# Patient Record
Sex: FEMALE | Race: WHITE | NOT HISPANIC OR LATINO | Employment: OTHER | URBAN - METROPOLITAN AREA
[De-identification: names, ages, dates, MRNs, and addresses within clinical notes are randomized per-mention and may not be internally consistent; named-entity substitution may affect disease eponyms.]

---

## 2017-03-03 DIAGNOSIS — R11.0 NAUSEA: ICD-10-CM

## 2017-03-03 RX ORDER — ONDANSETRON 4 MG/1
TABLET, ORALLY DISINTEGRATING ORAL
Qty: 30 TABLET | Refills: 1 | Status: SHIPPED
Start: 2017-03-03 | End: 2017-03-20

## 2017-03-03 NOTE — TELEPHONE ENCOUNTER
ondansetron (ZOFRAN-ODT) 4 MG disintegrating tablet    Last Written Prescription Date:  9/27/16  Last Fill Quantity: 30,   # refills: 1  Last Office Visit with [facility], [facility] or [facility] prescribing provider: 10/27/16  Future Office visit:  none

## 2017-03-15 DIAGNOSIS — I10 ESSENTIAL HYPERTENSION, BENIGN: ICD-10-CM

## 2017-03-15 RX ORDER — AMLODIPINE BESYLATE 10 MG/1
10 TABLET ORAL DAILY
Qty: 10 TABLET | Refills: 0 | Status: SHIPPED | OUTPATIENT
Start: 2017-03-15 | End: 2017-03-24

## 2017-03-20 DIAGNOSIS — R11.0 NAUSEA: ICD-10-CM

## 2017-03-20 RX ORDER — ONDANSETRON 4 MG/1
TABLET, ORALLY DISINTEGRATING ORAL
Qty: 90 TABLET | Refills: 2 | Status: SHIPPED
Start: 2017-03-20 | End: 2019-06-24

## 2017-03-24 ENCOUNTER — OFFICE VISIT (OUTPATIENT)
Dept: PHARMACY | Facility: CLINIC | Age: 67
End: 2017-03-24
Payer: COMMERCIAL

## 2017-03-24 ENCOUNTER — OFFICE VISIT (OUTPATIENT)
Dept: DERMATOLOGY | Facility: CLINIC | Age: 67
End: 2017-03-24

## 2017-03-24 VITALS — DIASTOLIC BLOOD PRESSURE: 82 MMHG | HEART RATE: 90 BPM | SYSTOLIC BLOOD PRESSURE: 136 MMHG

## 2017-03-24 VITALS — BODY MASS INDEX: 29.85 KG/M2 | WEIGHT: 196.3 LBS

## 2017-03-24 DIAGNOSIS — B35.3 TINEA PEDIS OF BOTH FEET: ICD-10-CM

## 2017-03-24 DIAGNOSIS — F41.9 ANXIETY: ICD-10-CM

## 2017-03-24 DIAGNOSIS — L82.0 INFLAMED SEBORRHEIC KERATOSIS: ICD-10-CM

## 2017-03-24 DIAGNOSIS — M25.50 MULTIPLE JOINT PAIN: ICD-10-CM

## 2017-03-24 DIAGNOSIS — E63.9 NUTRITIONAL DEFICIENCY: ICD-10-CM

## 2017-03-24 DIAGNOSIS — L72.0 EIC (EPIDERMAL INCLUSION CYST): ICD-10-CM

## 2017-03-24 DIAGNOSIS — Z85.3 HISTORY OF BREAST CANCER IN FEMALE: ICD-10-CM

## 2017-03-24 DIAGNOSIS — L21.9 DERMATITIS, SEBORRHEIC: Primary | ICD-10-CM

## 2017-03-24 DIAGNOSIS — Z85.828 HISTORY OF NONMELANOMA SKIN CANCER: ICD-10-CM

## 2017-03-24 DIAGNOSIS — E78.00 PURE HYPERCHOLESTEROLEMIA: ICD-10-CM

## 2017-03-24 DIAGNOSIS — L21.9 SEBORRHEIC DERMATITIS: ICD-10-CM

## 2017-03-24 DIAGNOSIS — D18.01 CHERRY ANGIOMA: ICD-10-CM

## 2017-03-24 DIAGNOSIS — K29.50 CHRONIC GASTRITIS WITHOUT BLEEDING, UNSPECIFIED GASTRITIS TYPE: ICD-10-CM

## 2017-03-24 DIAGNOSIS — I10 ESSENTIAL HYPERTENSION, BENIGN: Primary | ICD-10-CM

## 2017-03-24 PROCEDURE — 99607 MTMS BY PHARM ADDL 15 MIN: CPT | Performed by: PHARMACIST

## 2017-03-24 PROCEDURE — 99605 MTMS BY PHARM NP 15 MIN: CPT | Performed by: PHARMACIST

## 2017-03-24 RX ORDER — KETOCONAZOLE 20 MG/ML
SHAMPOO TOPICAL DAILY PRN
Qty: 120 ML | Refills: 11 | Status: SHIPPED | OUTPATIENT
Start: 2017-03-24 | End: 2018-10-16

## 2017-03-24 RX ORDER — PRENATAL VIT 91/IRON/FOLIC/DHA 28-975-200
COMBINATION PACKAGE (EA) ORAL 2 TIMES DAILY
Qty: 12 G | Refills: 1 | Status: SHIPPED | OUTPATIENT
Start: 2017-03-24 | End: 2018-10-16

## 2017-03-24 ASSESSMENT — PAIN SCALES - GENERAL: PAINLEVEL: NO PAIN (0)

## 2017-03-24 NOTE — NURSING NOTE
Dermatology Rooming Note    Nadira Perez's goals for this visit include:   Chief Complaint   Patient presents with     Derm Problem     Nadira is here today for a skin check.           Ruma Stevens LPN

## 2017-03-24 NOTE — PATIENT INSTRUCTIONS
Recommendations from today's MTM visit:                                                        1. I will double check and get back to you, but it is likely appropriate for you to go back on a daily 81mg tablet of aspirin.    2. Have your vitamin D checked yearly if you are going to take more than 2000 IU of vitamin D chronically (daily).    Next MTM visit: June 16th at 10am    To schedule another MTM appointment, please call the clinic directly or you may call the MTM scheduling line at [phone] or toll-free at [phone].     My Clinical Pharmacist's contact information:                                                      It was a pleasure seeing you today!  Please feel free to contact me with any questions or concerns you have.      Bee Hudson PharmD  Medication Therapy Management Provider  Phone:[phone]  Pager: [phone]    You may receive a survey about the MTM services you received.  I would appreciate your feedback to help me serve you better in the future. Please fill it out and return it when you can. Your comments will b1. Pt e anonymous.

## 2017-03-24 NOTE — MR AVS SNAPSHOT
After Visit Summary   3/24/2017    Nadira Perez    MRN: [id number]           Patient Information     Date Of Birth          1950        Visit Information        Provider Department      3/24/2017 10:00 AM Bee Hudson [facility] Medication Therapy Management        Today's Diagnoses     Essential hypertension, benign    -  1    Chronic gastritis without bleeding, unspecified gastritis type        Multiple joint pain        Anxiety        Pure hypercholesterolemia        History of breast cancer in female        Nutritional deficiency          Care Instructions    Recommendations from today's MTM visit:                                                        1. I will double check and get back to you, but it is likely appropriate for you to go back on a daily 81mg tablet of aspirin.    2. Have your vitamin D checked yearly if you are going to take more than 2000 IU of vitamin D chronically (daily).    Next MTM visit: June 16th at 10am    To schedule another MTM appointment, please call the clinic directly or you may call the MTM scheduling line at [phone] or toll-free at [phone].     My Clinical Pharmacist's contact information:                                                      It was a pleasure seeing you today!  Please feel free to contact me with any questions or concerns you have.      Bee Hudson, PharmD  Medication Therapy Management Provider  Phone:[phone]  Pager: [phone]    You may receive a survey about the MTM services you received.  I would appreciate your feedback to help me serve you better in the future. Please fill it out and return it when you can. Your comments will b1. Pt e anonymous.                  Follow-ups after your visit        Your next 10 appointments already scheduled     Apr 17, 2017  3:00 PM CDT   (Arrive by 2:45 PM)   Return Visit with Khushbu Louise MD   [facility] ([facility])    [address]  2nd Floor  [facility] [id number] [phone]            Jun 16, 2017 10:00 AM CDT   (Arrive by 9:45 AM)   SHORT with Bee Hudson RPH   [facility] Medication Therapy Management ([facility])    [address]  [address]th [facility] [id number] [phone]              Who to contact     If you have questions or need follow up information about today's clinic visit or your schedule please contact [facility] MEDICATION THERAPY MANAGEMENT directly at [phone].  Normal or non-critical lab and imaging results will be communicated to you by ProspectStreamhart, letter or phone within 4 business days after the clinic has received the results. If you do not hear from us within 7 days, please contact the clinic through IMTt or phone. If you have a critical or abnormal lab result, we will notify you by phone as soon as possible.  Submit refill requests through CopyRightNow or call your pharmacy and they will forward the refill request to us. Please allow 3 business days for your refill to be completed.          Additional Information About Your Visit        ProspectStreamharPriori Data Information     [employer]Now gives you secure access to your electronic health record. If you see a primary care provider, you can also send messages to your care team and make appointments. If you have questions, please call your primary care clinic.  If you do not have a primary care provider, please call [phone] and they will assist you.        Care EveryWhere ID     This is your Care EveryWhere ID. This could be used by other organizations to access your [place] medical records  [id number]        Your Vitals Were     BMI (Body Mass Index)                   29.85 kg/m2            Blood Pressure from Last 3 Encounters:   03/24/17 136/82   12/22/16 124/80   11/23/16 120/72    Weight from Last 3 Encounters:   03/24/17 196 lb 4.8 oz (89 kg)   12/22/16 202 lb (91.6 kg)   10/27/16 198 lb (89.8  kg)              Today, you had the following     No orders found for display         Today's Medication Changes          These changes are accurate as of: 3/24/17 11:59 PM.  If you have any questions, ask your nurse or doctor.               Start taking these medicines.        Dose/Directions    ketoconazole 2 % shampoo   Commonly known as:  NIZORAL   Used for:  Dermatitis, seborrheic   Started by:  Abe Wilkerson MD        Apply topically daily as needed for itching or irritation   Quantity:  120 mL   Refills:  11       terbinafine 1 % cream   Commonly known as:  lamISIL AT   Used for:  Tinea pedis of both feet   Started by:  Abe Wilkerson MD        Apply topically 2 times daily For fungal infection not resolved with other antifungals (e.g. Clotrimazole)   Quantity:  12 g   Refills:  1            Where to get your medicines      Some of these will need a paper prescription and others can be bought over the counter.  Ask your nurse if you have questions.     Bring a paper prescription for each of these medications     ketoconazole 2 % shampoo    terbinafine 1 % cream                Primary Care Provider Office Phone # Fax #    MERCEDES Rivera [phone]       [facility] [address] [place] [address] 7[address]  [facility] [address]        Thank you!     Thank you for choosing [facility] MEDICATION THERAPY MANAGEMENT  for your care. Our goal is always to provide you with excellent care. Hearing back from our patients is one way we can continue to improve our services. Please take a few minutes to complete the written survey that you may receive in the mail after your visit with us. Thank you!             Your Updated Medication List - Protect others around you: Learn how to safely use, store and throw away your medicines at www.disposemymeds.org.          This list is accurate as of: 3/24/17 11:59 PM.  Always use your most recent med list.                   Brand Name Dispense  Instructions for use    amLODIPine 10 MG tablet    NORVASC    90 tablet    Take 1 tablet (10 mg) by mouth daily AS DIRECTED       aspirin 81 MG tablet      Take 1 tablet by mouth daily Reported on 3/24/2017       atorvastatin 40 MG tablet    LIPITOR    90 tablet    Take 1 tablet (40 mg) by mouth daily Please put on profile- pt just received 90-day supply of cholesterol meds       * cholecalciferol 1000 UNIT tablet    vitamin D    100 tablet    Take 1 tablet (1,000 Units) by mouth daily       * cholecalciferol 5000 UNITS Caps capsule    vitamin D3    120 capsule    Take 1 capsule (5,000 Units) by mouth daily       CoQ10 100 MG Caps     100 capsule    Take 2 capsules (200 mg) by mouth daily       diclofenac 1 % Gel topical gel    VOLTAREN    100 g    Apply 4 grams to knees or 2 grams to hands four times daily using enclosed dosing card.       gabapentin 300 MG capsule    NEURONTIN    400 capsule    1-2 tablets EVERY 8 HOURS       HYDROcodone-acetaminophen 5-325 MG per tablet    NORCO    20 tablet    Take 1 tablet by mouth every 6 hours as needed       ketoconazole 2 % shampoo    NIZORAL    120 mL    Apply topically daily as needed for itching or irritation       LORazepam 2 MG tablet    ATIVAN    30 tablet    Take 1 tablet at night for sleep       metoprolol 25 MG 24 hr tablet    TOPROL-XL    90 tablet    Take 1 tablet (25 mg) by mouth daily       Multi-vitamin Tabs tablet   Generic drug:  multivitamin, therapeutic with minerals      Take 1 tablet by mouth daily.       OMEGA-3 FISH OIL PO      Take 1 capsule by mouth daily       ondansetron 4 MG ODT tab    ZOFRAN-ODT    90 tablet    1-2 tabs po prn every 6 hours       REPHRESH Gel     14 Box    Place 2 g vaginally every 3 days       SYNTHROID 112 MCG tablet   Generic drug:  levothyroxine     90 tablet    Take 1/2 tablet daily       tamoxifen 20 MG tablet    NOLVADEX    90 tablet    Take 1 tablet (20 mg) by mouth daily       terbinafine 1 % cream    lamISIL AT    12 g     Apply topically 2 times daily For fungal infection not resolved with other antifungals (e.g. Clotrimazole)       triamcinolone 0.1 % ointment    KENALOG    30 g    Apply topically 2 times daily       triamterene-hydrochlorothiazide 37.5-25 MG per tablet    MAXZIDE-25    90 tablet    Take 1 tablet by mouth daily       * Notice:  This list has 2 medication(s) that are the same as other medications prescribed for you. Read the directions carefully, and ask your doctor or other care provider to review them with you.

## 2017-03-24 NOTE — PROGRESS NOTES
SUBJECTIVE/OBJECTIVE:                                                    Nadira Perez is a 66 year old female coming in for a follow-up visit for Medication Therapy Management.      Chief Complaint: Follow up from our visit on Dec 22, 2016.  Pt has no major concerns today but is here for annual check.    Tobacco: No tobacco use   Alcohol: not currently using    Medication Adherence: no issues reported    Hypertension: Pt is currently taking amlodipine 10mg, triamterene/HCTZ 37.5/25mg, and metoprolol 25mg daily. Pt is not having as much of the urinary urgency as she had in the past and occasional ankle swelling when she has more sodium.  GERD/Nausea: Pt had gastritis likely from chronic ibuprofen use. She has had improvement in heartburn sypmtoms since stopping NSAIDs but still has occasional nausea, which she treats when she needs to with Zofran. She is no longer taking ranitidine.  Low Back/Left Wrist Pain: Pt has not tried Votaren yet because she has been wrapping her wrists for lymphedema. She has been using APAP 1000mg TID with some improvement in pain. Pt rarely uses Vicodin (fills maybe twice per year and has not had one in 6 months). Pt tried to come down on gabapentin and had worsening pain, so she remains on 600mg TID.  Anxiety: Pt rarely uses lorazepam, maybe once every 3-4 months. She is trying to avoid using it altogether as she knows it has been associated with increased risk for dementia.  Lipids/Stroke Prevention: Pt is currently taking atorvastatin 40mg, fish oil and CoQ10 (unsure of dose) without complaint of side effects. Pt historically has taken a daily aspirin but stopped when diagnosed with gastritis and has not restarted.   The 10-year ASCVD risk score (Rani THOM Jr, et al., 2013) is: 7.7%    Values used to calculate the score:      Age: 66 years      Sex: Female      Is Non- : No      Diabetic: No      Tobacco smoker: No      Systolic Blood Pressure: 136 mmHg       Is BP treated: Yes      HDL Cholesterol: 56 mg/dL      Total Cholesterol: 208 mg/dL  Hx of Ductal Carcinoma: Pt continues to see oncology yearly. Pt continues on tamoxifen and has been on this therapy for 5 years, following 5 years of Arimidex therapy. She is wondering if I think she should continue tamoxifen, switch back to Arimidex, or discontinue secondary prophylaxis altogether.  Vitamin D Deficiency: Pt is currently taking vitamin D 6000 IU daily and has been on this dose chronically due to history of deficiency.  25 OH Vit D2   Date Value Ref Range Status   11/21/2011 <5 ug/L Final   11/17/2010 <5 ug/L Final   05/27/2010 <5 ug/L Final     25 OH Vit D3   Date Value Ref Range Status   11/21/2011 34 ug/L Final   11/17/2010 34 ug/L Final   05/27/2010 32 ug/L Final     Current labs include:  BP Readings from Last 3 Encounters:   12/22/16 124/80   11/23/16 120/72   10/27/16 130/81     Today's Vitals: Wt 196 lb 4.8 oz (89 kg)  BMI 29.85 kg/m2 From other visit today:  Vital Signs 3/24/2017   Systolic 136   Diastolic 82   Pulse 90     Lab Results   Component Value Date    A1C 6.0 10/27/2016   .  Lab Results   Component Value Date    CHOL 208 10/27/2016     Lab Results   Component Value Date    TRIG 182 10/27/2016     Lab Results   Component Value Date    HDL 56 10/27/2016     Lab Results   Component Value Date     10/27/2016       Liver Function Studies -   Recent Labs   Lab Test  09/15/16   1411   PROTTOTAL  7.5   ALBUMIN  4.3   BILITOTAL  0.4   ALKPHOS  70   AST  30   ALT  39       Lab Results   Component Value Date    UCRR 49 12/22/2008       Last Basic Metabolic Panel:  Lab Results   Component Value Date     09/15/2016      Lab Results   Component Value Date    POTASSIUM 3.0 09/15/2016     Lab Results   Component Value Date    CHLORIDE 102 09/15/2016     Lab Results   Component Value Date    BUN 13 09/15/2016     Lab Results   Component Value Date    CR 0.55 09/15/2016     GFR Estimate   Date Value  Ref Range Status   09/15/2016 >90  Non  GFR Calc   >60 mL/min/1.7m2 Final   10/30/2015 >90  Non  GFR Calc   >60 mL/min/1.7m2 Final   11/06/2014 >90  Non  GFR Calc   >60 mL/min/1.7m2 Final     GFR Estimate If Black   Date Value Ref Range Status   09/15/2016 >90   GFR Calc   >60 mL/min/1.7m2 Final   10/30/2015 >90   GFR Calc   >60 mL/min/1.7m2 Final   11/06/2014 >90   GFR Calc   >60 mL/min/1.7m2 Final     TSH   Date Value Ref Range Status   10/27/2016 3.09 0.40 - 4.00 mU/L Final   ]    Most Recent Immunizations   Administered Date(s) Administered     Hepatitis A Vac Ped/Adol-2 Dose 10/26/2005     Hepatitis B 10/26/2005     Influenza (IIV3) 10/01/2014     Influenza Intranasal Vaccine 4 valent 10/02/2015     Influenza Vaccine IM 3yrs+ 4 Valent IIV4 10/13/2016     Pneumococcal 23 valent 10/01/2008     TD (ADULT, 7+) 04/26/2005     TDAP Vaccine (Boostrix) 11/06/2012     Typhoid IM 04/26/2005     Zoster vaccine, live 11/14/2012     ASSESSMENT:                                                    Current medications were reviewed today as discussed above.      Medication Adherence: no issues identified    Hypertension: Stable. BP at goal <140/90.  GERD/Nausea: Stable. Pt encouraged to remain off oral NSAIDs.  Low Back/Left Wrist Pain: Stable. Current therapies appropriate and likely safe.  Anxiety: Stable. Pt encouraged to avoid use as much as possible but use 3 times per year is likely safe.  Lipids/Stroke Prevention: Stable. Pt to remain off ASA per current USPSTF guidelines for primary prevention.  Hx of Ductal Carcinoma: Unimproved. Pt encouraged to follow guidance from oncology, as they are able to provide decision making guidance beyond my scope of knowledge.  Vitamin D Deficiency: Needs improvement. Pt would benefit from yearly Vitamin D level if taking doses greater than 2000 IU chronically.     PLAN:                                                       1. Pt to remain off aspirin.    2. Pt to have vitamin D level drawn at next annual exam.    I spent 40 minutes with this patient today.  All changes were made via collaborative practice agreement with Matilde Quiñonez. A copy of the visit note was provided to the patient's primary care provider.     Will follow up in 3 months, sooner if needed.    The patient was given a summary of these recommendations as an after visit summary.    Bee Hudson PharmD  Medication Therapy Management Provider  Phone:[phone]  Pager: [phone]

## 2017-03-24 NOTE — MR AVS SNAPSHOT
After Visit Summary   3/24/2017    Nadira Perez    MRN: [id number]           Patient Information     Date Of Birth          1950        Visit Information        Provider Department      3/24/2017 10:30 AM Abe Wilkerson MD [facility] Dermatology        Today's Diagnoses     Dermatitis, seborrheic    -  1    Tinea pedis of both feet           Follow-ups after your visit        Follow-up notes from your care team     Return in about 1 year (around 3/24/2018).      Your next 10 appointments already scheduled     Apr 17, 2017  3:00 PM CDT   (Arrive by 2:45 PM)   Return Visit with Khushbu Louise MD   [facility] ([facility])    [address]  2nd Floor  [facility] [address] [phone]            Jun 16, 2017 10:00 AM CDT   (Arrive by 9:45 AM)   [facility] ([facility]    [address]  4th Floor  [facility] [address] [phone]              Who to contact     Please call your clinic at [phone] to:    Ask questions about your health    Make or cancel appointments    Discuss your medicines    Learn about your test results    Speak to your doctor   If you have compliments or concerns about an experience at your clinic, or if you wish to file a complaint, please contact [facility] Patient Relations at [phone] or email us at [email or web address]         Additional Information About Your Visit        MyChart Information     Bol[employer] gives you secure access to your electronic health record. If you see a primary care provider, you can also send messages to your care team and make appointments. If you have questions, please call your primary care clinic.  If you do not have a primary care provider, please call [phone] and they will assist you.      [employer] is an electronic gateway  that provides easy, online access to your medical records. With [employer], you can request a clinic appointment, read your test results, renew a prescription or communicate with your care team.     To access your existing account, please contact your [facility] or call [phone] for assistance.        Care EveryWhere ID     This is your Care EveryWhere ID. This could be used by other organizations to access your [place] medical records  [id number]        Your Vitals Were     Pulse                   90            Blood Pressure from Last 3 Encounters:   03/24/17 136/82   12/22/16 124/80   11/23/16 120/72    Weight from Last 3 Encounters:   03/24/17 89 kg (196 lb 4.8 oz)   12/22/16 91.6 kg (202 lb)   10/27/16 89.8 kg (198 lb)              Today, you had the following     No orders found for display         Today's Medication Changes          These changes are accurate as of: 3/24/17 11:39 AM.  If you have any questions, ask your nurse or doctor.               Start taking these medicines.        Dose/Directions    ketoconazole 2 % shampoo   Commonly known as:  NIZORAL   Used for:  Dermatitis, seborrheic   Started by:  Abe Wilkerson MD        Apply topically daily as needed for itching or irritation   Quantity:  120 mL   Refills:  11       terbinafine 1 % cream   Commonly known as:  lamISIL AT   Used for:  Tinea pedis of both feet   Started by:  Abe Wilkerson MD        Apply topically 2 times daily For fungal infection not resolved with other antifungals (e.g. Clotrimazole)   Quantity:  12 g   Refills:  1            Where to get your medicines      Some of these will need a paper prescription and others can be bought over the counter.  Ask your nurse if you have questions.     Bring a paper prescription for each of these medications     ketoconazole 2 % shampoo    terbinafine 1 % cream                Primary Care Provider Office Phone # Fax #    MERCEDES Rivera CNP  [phone]       PRIMARY [facility]ARE [facility] [address] [place] [address] [address]  [facility] [address]        Thank you!     Thank you for choosing [facility] DERMATOLOGY  for your care. Our goal is always to provide you with excellent care. Hearing back from our patients is one way we can continue to improve our services. Please take a few minutes to complete the written survey that you may receive in the mail after your visit with us. Thank you!             Your Updated Medication List - Protect others around you: Learn how to safely use, store and throw away your medicines at www.disposemymeds.org.          This list is accurate as of: 3/24/17 11:39 AM.  Always use your most recent med list.                   Brand Name Dispense Instructions for use    amLODIPine 10 MG tablet    NORVASC    90 tablet    Take 1 tablet (10 mg) by mouth daily AS DIRECTED       aspirin 81 MG tablet      Take 1 tablet by mouth daily Reported on 3/24/2017       atorvastatin 40 MG tablet    LIPITOR    90 tablet    Take 1 tablet (40 mg) by mouth daily Please put on profile- pt just received 90-day supply of cholesterol meds       * cholecalciferol 1000 UNIT tablet    vitamin D    100 tablet    Take 1 tablet (1,000 Units) by mouth daily       * cholecalciferol 5000 UNITS Caps capsule    vitamin D3    120 capsule    Take 1 capsule (5,000 Units) by mouth daily       CoQ10 100 MG Caps     100 capsule    Take 2 capsules (200 mg) by mouth daily       diclofenac 1 % Gel topical gel    VOLTAREN    100 g    Apply 4 grams to knees or 2 grams to hands four times daily using enclosed dosing card.       gabapentin 300 MG capsule    NEURONTIN    400 capsule    1-2 tablets EVERY 8 HOURS       HYDROcodone-acetaminophen 5-325 MG per tablet    NORCO    20 tablet    Take 1 tablet by mouth every 6 hours as needed       ketoconazole 2 % shampoo    NIZORAL    120 mL    Apply topically daily as needed for itching or irritation       LORazepam 2 MG tablet    ATIVAN     30 tablet    Take 1 tablet at night for sleep       metoprolol 25 MG 24 hr tablet    TOPROL-XL    90 tablet    Take 1 tablet (25 mg) by mouth daily       Multi-vitamin Tabs tablet   Generic drug:  multivitamin, therapeutic with minerals      Take 1 tablet by mouth daily.       OMEGA-3 FISH OIL PO      Take 1 capsule by mouth daily       ondansetron 4 MG ODT tab    ZOFRAN-ODT    90 tablet    1-2 tabs po prn every 6 hours       REPHRESH Gel     14 Box    Place 2 g vaginally every 3 days       SYNTHROID 112 MCG tablet   Generic drug:  levothyroxine     90 tablet    Take 1/2 tablet daily       tamoxifen 20 MG tablet    NOLVADEX    90 tablet    Take 1 tablet (20 mg) by mouth daily       terbinafine 1 % cream    lamISIL AT    12 g    Apply topically 2 times daily For fungal infection not resolved with other antifungals (e.g. Clotrimazole)       triamcinolone 0.1 % ointment    KENALOG    30 g    Apply topically 2 times daily       triamterene-hydrochlorothiazide 37.5-25 MG per tablet    MAXZIDE-25    90 tablet    Take 1 tablet by mouth daily       * Notice:  This list has 2 medication(s) that are the same as other medications prescribed for you. Read the directions carefully, and ask your doctor or other care provider to review them with you.

## 2017-03-24 NOTE — PROGRESS NOTES
[facility] Dermatology Note      Dermatology Problem List:  1. History of SCC of the perineum, tx w/ excision and radiation  2. SK's. Multiple over chest back and face, and legs  3. Tinea pedis w/ onychomycosis   - Terbinafine cream  4. Seborrheic dermatitis   - 2% ketoconazole shampoo, alternate with Head and Shoulders  4. Hand dermatitis: Triamcinolone 0.1% cream PRN    Encounter Date: Mar 24, 2017    CC:  Chief Complaint   Patient presents with     Derm Problem     Nadira is here today for a skin check.             History of Present Illness:  Ms. Nadira Perez is a 66 year old female with a history of SCC of the perineum many years ago, treated w/  who presents as a follow-up for annual skin exam. The patient was last seen 03/25/2016 by Dr. Morgan when she had no evidence of recurrence of the SCC of the perineum, had many inflamed SK's that were removed by cryotherapy, and pt had Tinea pedis and onychomycosis she has been treating with Steve's vapor rub w/ improvement of the onychomycosis, she is not treating the tinea pedis.    Today, the patient states she has had significant improvement of the onychomycosis after using the Vicks vapor rub, she has not noticed any itchiness of her feet. She has not noticed any recurrence of the SCC, and states she gets regular exams by her Gynecologist as well.     Regarding her general skin, she has many SK's that are symptomatic, especially over her upper chest that she will occasionally pick at and they will bleed. They are not overly itchy otherwise. She is interested hearing about treatment options for these. She states she had a large amount of sun exposure when she was younger, especially to the face and shoulders. Now she wears sunscreen all the time. She has no personal history of melanoma, but does have a family history of it in her aunt. She has used tanning beds <10 times in her lifetime.     Past Medical History:   Patient Active Problem  List   Diagnosis     Infiltrating ductal ca grade 2, ERpositive, PRpositive, HER2 negative by FISH     Hypopotassemia     Hyperlipidemia     Murmurs     Nephrolithiasis     Osteoarthrosis, hand     Personal history of other malignant neoplasm of skin     Inflamed seborrheic keratosis     Essential hypertension, benign     Osteoporosis     Mechanical problems with limbs     Hypovitaminosis D     Contact dermatitis and other eczema, due to unspecified cause     Dermatitis     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     Corneal opacity     Hypothyroidism     Past Medical History:   Diagnosis Date     Arthritis      Breast cancer (H)      H/O kyphoplasty      History of kidney stones      Hyperlipemia      Hypertension      Hypopotassemia      Lymph edema      Medullary sponge kidney      Osteopenia      PONV (postoperative nausea and vomiting)      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     Thyroid disease      Past Surgical History:   Procedure Laterality Date     ABDOMEN SURGERY      ovarian cyst, mesh     ARTHRODESIS WRIST       ARTHRODESIS WRIST  2/14/2013    Procedure: ARTHRODESIS WRIST;  left wrist scaphoid excision, four bone fusion, iliac crest bone graft  ( Mac with block);  Surgeon: Av Mendez MD;  [facility]     BIOPSY      skin, vaginal     BIOPSY OF SKIN LESION       COLONOSCOPY  12/24/2013    Procedure: COMBINED COLONOSCOPY, SINGLE BIOPSY/POLYPECTOMY BY BIOPSY;  COLONOSCOPY;  Surgeon: Dom Alvarez MD;  Location:  GI     COSMETIC SURGERY       ESOPHAGOSCOPY, GASTROSCOPY, DUODENOSCOPY (EGD), COMBINED N/A 11/23/2016    Procedure: COMBINED ESOPHAGOSCOPY, GASTROSCOPY, DUODENOSCOPY (EGD);  Surgeon: Quinten Feliciano MD;  Location:  GI     EXTERNAL EAR SURGERY      right     EYE SURGERY      radial keratomy     GRAFT BONE FROM ILIAC CREST  2/14/2013    Procedure: GRAFT BONE FROM ILIAC CREST;  mac with block and local infilitration;  Surgeon: Av Mendez,  MD;  [facility]      BREATH HYDROGEN TEST N/A 10/14/2016    Procedure: HYDROGEN BREATH TEST;  Surgeon: Cheri Barron MD;  Location:  GI     HERNIA REPAIR      UMBILICAL     HERNIA REPAIR      umbilical age 18 mos.     HERNIA REPAIR       HERNIORRHAPHY UMBILICAL  1[birth year]31[birth year]1954     HYSTERECTOMY TOTAL ABDOMINAL  5/3/00     HYSTERECTOMY TOTAL ABDOMINAL  5/3/2000     MASTECTOMY      PROPHYLACTIC RIGHT BREAST     MASTECTOMY MODIFIED RADICAL      LEFT BREAST     MASTECTOMY MODIFIED RADICAL      bilateral; right breast prophylactic     PARATHYROIDECTOMY  9/23/04    R SUPERIOR     PARATHYROIDECTOMY  9/23/2004    parathyroid resection, subtotal     REMOVE HARDWARE HAND  9/24/2013    Procedure: REMOVE HARDWARE HAND;  Left Hand Screw Removal        RHINOPLASTY  12/31/1985     RHINOPLASTY  12/31/1985     thyr proc skin closed cosmetic manner by subcuticular stitch  1/23/2009     TONSILLECTOMY  3/1/68     TONSILLECTOMY  3/1/1968     WRIST SURGERY      wrist arthrodesis       Social History:  The patient works as a nurse, now mostly in education of new nurses. The patient used to use tanning beds, est. 10 times in her lifetime    Family History:  There is a family history of melanoma in the patient's aunt.    Medications:  Current Outpatient Prescriptions   Medication Sig Dispense Refill     ondansetron (ZOFRAN-ODT) 4 MG ODT tab 1-2 tabs po prn every 6 hours 90 tablet 2     diclofenac (VOLTAREN) 1 % GEL topical gel Apply 4 grams to knees or 2 grams to hands four times daily using enclosed dosing card. 100 g 1     tamoxifen (NOLVADEX) 20 MG tablet Take 1 tablet (20 mg) by mouth daily 90 tablet 3     Coenzyme Q10 (COQ10) 100 MG CAPS Take 2 capsules (200 mg) by mouth daily 100 capsule 1     Omega-3 Fatty Acids (OMEGA-3 FISH OIL PO) Take 1 capsule by mouth daily        gabapentin (NEURONTIN) 300 MG capsule 1-2 tablets EVERY 8 HOURS 400 capsule 3     metoprolol (TOPROL-XL) 25 MG 24 hr tablet Take 1 tablet (25 mg)  by mouth daily 90 tablet 3     amLODIPine (NORVASC) 10 MG tablet Take 1 tablet (10 mg) by mouth daily AS DIRECTED 90 tablet 3     SYNTHROID 112 MCG tablet Take 1/2 tablet daily 90 tablet 3     atorvastatin (LIPITOR) 40 MG tablet Take 1 tablet (40 mg) by mouth daily Please put on profile- pt just received 90-day supply of cholesterol meds 90 tablet 3     triamterene-hydrochlorothiazide (MAXZIDE-25) 37.5-25 MG per tablet Take 1 tablet by mouth daily 90 tablet 3     HYDROcodone-acetaminophen (NORCO) 5-325 MG per tablet Take 1 tablet by mouth every 6 hours as needed 20 tablet 0     LORazepam (ATIVAN) 2 MG tablet Take 1 tablet at night for sleep 30 tablet 1     cholecalciferol (VITAMIN D) 1000 UNIT tablet Take 1 tablet (1,000 Units) by mouth daily 100 tablet 3     cholecalciferol (VITAMIN D3) 5000 UNITS CAPS capsule Take 1 capsule (5,000 Units) by mouth daily 120 capsule 3     triamcinolone (KENALOG) 0.1 % ointment Apply topically 2 times daily 30 g 1     Vaginal Lubricant (REPHRESH) GEL Place 2 g vaginally every 3 days 14 Box 3     aspirin 81 MG tablet Take 1 tablet by mouth daily Reported on 3/24/2017       Multiple Vitamin (MULTI-VITAMIN) per tablet Take 1 tablet by mouth daily.       [DISCONTINUED] amLODIPine (NORVASC) 10 MG tablet Take 1 tablet (10 mg) by mouth daily 10 tablet 0     Allergies   Allergen Reactions     Penicillins Hives         Review of Systems:  -Constitutional: The patient denies fatigue, fevers, chills, unintended weight loss, and night sweats.  -HEENT: Patient denies nonhealing oral sores.  -Skin: As above in HPI. No additional skin concerns.    Physical exam:  Vitals: There were no vitals taken for this visit.  GEN: This is a well developed, well-nourished female in no acute distress, in a pleasant mood.    SKIN: Full skin, which includes the head/face, both arms, chest, back, abdomen,both legs, genitalia and/or groin buttocks, digits and/or nails, was examined. Patient's skin shows evidence of  prolonged ultraviolet light exposure.  Color is dull; there are deep and superficial furrows and wrinkles, particularly in sun-exposed areas of malar cheeks and temples, as well as the presence of increased numbers of telangiectasias, hyper- and hypopigmented macules, and as well decreased cutaneous elasticity.  -Non-pitting edema of the left arm  -Scars on chest from past bilateral mastectomy  - Patch of telangiectasias on the central chest in well defined linear distribution from past radiation  - Scattered pink- red papules   - 2-3mm nodule right inguinal fold, inferior to posterior labia majora, consistent with EIC  - Scale between 4th and 5th digits of toe web spaces  - Thickened dystrophic yellow left great toe, with horizontal ridge present. Proximal to horizontal ridge, nail appears clear and healthy.   - Scattered pigmetned macules and papules, w/ regular borders and all <6mm, consistent with clinically benign appearing nevi  - Multiple waxy, stuck on appearing flat topped macules with sharp borders over chest, back and abdomen. Some of these are raised, more papular and with a faint erythematous base and excoriated top.  - diffuse faint erythema and scale over scalp.   -No other lesions of concern on areas examined.     Impression/Plan:  1. History of SCC of the perineum    No evidence of recurrence on limited examination today, the patient follows with gynecology for examination as well.     2. Seborrheic keratosis, inflamed. Benign nature was discussed, however symptomatic as many are irritated, occasionally bleed and itch.     Cryotherapy procedure note (performed by faculty): After verbal consent and discussion of risks and benefits including but no limited to dyspigmentation/scar, blister, infection, recurrence, 8 irritated lesions over chest and back was(were) treated with 1-2mm freeze border for 2 cycles with liquid nitrogen. Post cryotherapy instructions were provided.     3. Benign skin findings  include EIC, cherry angiomas, benign appearing nevi, and radiation induced telangiectasias    Benign nature was discussed.No further intervention required at this time.     4. Tinea pedis. Previously suspected onychomycosis given yellow discoloration of the toenails, however these only involved the 1st and 5th toenails of each foot. Pt reports that the 5th toenail involvement completely resolved without treatment, and her 1st toe is improving a longitudinal ridge, this is suggestive of trauma-induced onychodystrophy     Recommended OTC lotrimin for tinea pedis and to prevent nail involvement    Discussed PO Terbinafine, however will not prescribe on this visit given improvement in absence of oral or topical therapy    5. Seborrheic dermatitis -   - 2% ketoconazole and Head and shoulders, alternating daily    CC Dr. Quiñonez on close of this encounter.  Follow-up in 1 year, earlier for new or changing lesions.     Staff Involved:  Scribed by Quang Leone MS3 for Dr. Wilkerson.      Staff attestation:  The documentation recorded by the scribe accurately reflects the services I personally performed and the decisions I personally made.    Abe Wilkerson MD  Staff Dermatologist    Department of Dermatology

## 2017-03-24 NOTE — LETTER
3/24/2017       RE: Nadira Perez  [address]  [place] [address]     Dear Colleague,    Thank you for referring your patient, Nadira Perez, to the [facility] DERMATOLOGY at [facility]. Please see a copy of my visit note below.    [facility] Dermatology Note      Dermatology Problem List:  1. History of SCC of the perineum, tx w/ excision and radiation  2. SK's. Multiple over chest back and face, and legs  3. Tinea pedis w/ onychomycosis   - Terbinafine cream  4. Seborrheic dermatitis   - 2% ketoconazole shampoo, alternate with Head and Shoulders  4. Hand dermatitis: Triamcinolone 0.1% cream PRN    Encounter Date: Mar 24, 2017    CC:  Chief Complaint   Patient presents with     Derm Problem     Nadira is here today for a skin check.             History of Present Illness:  Ms. Nadira Perez is a 66 year old female with a history of SCC of the perineum many years ago, treated w/  who presents as a follow-up for annual skin exam. The patient was last seen 03/25/2016 by Dr. Morgan when she had no evidence of recurrence of the SCC of the perineum, had many inflamed SK's that were removed by cryotherapy, and pt had Tinea pedis and onychomycosis she has been treating with Steve's vapor rub w/ improvement of the onychomycosis, she is not treating the tinea pedis.    Today, the patient states she has had significant improvement of the onychomycosis after using the Vicks vapor rub, she has not noticed any itchiness of her feet. She has not noticed any recurrence of the SCC, and states she gets regular exams by her Gynecologist as well.     Regarding her general skin, she has many SK's that are symptomatic, especially over her upper chest that she will occasionally pick at and they will bleed. They are not overly itchy otherwise. She is interested hearing about treatment options for these. She states she had a large amount of sun exposure when  she was younger, especially to the face and shoulders. Now she wears sunscreen all the time. She has no personal history of melanoma, but does have a family history of it in her aunt. She has used tanning beds <10 times in her lifetime.     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     Corneal opacity     Hypothyroidism     Past Medical History:   Diagnosis Date     Arthritis      Breast cancer (H)      H/O kyphoplasty      History of kidney stones      Hyperlipemia      Hypertension      Hypopotassemia      Lymph edema      Medullary sponge kidney      Osteopenia      PONV (postoperative nausea and vomiting)      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     Thyroid disease      Past Surgical History:   Procedure Laterality Date     ABDOMEN SURGERY      ovarian cyst, mesh     ARTHRODESIS WRIST       ARTHRODESIS WRIST  2/14/2013    Procedure: ARTHRODESIS WRIST;  left wrist scaphoid excision, four bone fusion, iliac crest bone graft  ( Mac with block);  Surgeon: Av Mendez MD;  [facility]     BIOPSY      skin, vaginal     BIOPSY OF SKIN LESION       COLONOSCOPY  12/24/2013    Procedure: COMBINED COLONOSCOPY, SINGLE BIOPSY/POLYPECTOMY BY BIOPSY;  COLONOSCOPY;  Surgeon: Dom Alvarez MD;  Location:  GI     COSMETIC SURGERY       ESOPHAGOSCOPY, GASTROSCOPY, DUODENOSCOPY (EGD), COMBINED N/A 11/23/2016    Procedure: COMBINED ESOPHAGOSCOPY, GASTROSCOPY, DUODENOSCOPY  (EGD);  Surgeon: Quinten Feliciano MD;  Location:  GI     EXTERNAL EAR SURGERY      right     EYE SURGERY      radial keratomy     GRAFT BONE FROM ILIAC CREST  2/14/2013    Procedure: GRAFT BONE FROM ILIAC CREST;  mac with block and local infilitration;  Surgeon: Av Mendez MD;  [facility]      BREATH HYDROGEN TEST N/A 10/14/2016    Procedure: HYDROGEN BREATH TEST;  Surgeon: Cheri Barron MD;  Location:  GI     HERNIA REPAIR      UMBILICAL     HERNIA REPAIR      umbilical age 18 mos.     HERNIA REPAIR       HERNIORRHAPHY UMBILICAL  1[birth year]31[birth year]1954     HYSTERECTOMY TOTAL ABDOMINAL  5/3/00     HYSTERECTOMY TOTAL ABDOMINAL  5/3/2000     MASTECTOMY      PROPHYLACTIC RIGHT BREAST     MASTECTOMY MODIFIED RADICAL      LEFT BREAST     MASTECTOMY MODIFIED RADICAL      bilateral; right breast prophylactic     PARATHYROIDECTOMY  9/23/04    R SUPERIOR     PARATHYROIDECTOMY  9/23/2004    parathyroid resection, subtotal     REMOVE HARDWARE HAND  9/24/2013    Procedure: REMOVE HARDWARE HAND;  Left Hand Screw Removal        RHINOPLASTY  12/31/1985     RHINOPLASTY  12/31/1985     thyr proc skin closed cosmetic manner by subcuticular stitch  1/23/2009     TONSILLECTOMY  3/1/68     TONSILLECTOMY  3/1/1968     WRIST SURGERY      wrist arthrodesis       Social History:  The patient works as a nurse, now mostly in education of new nurses. The patient used to use tanning beds, est. 10 times in her lifetime    Family History:  There is a family history of melanoma in the patient's aunt.    Medications:  Current Outpatient Prescriptions   Medication Sig Dispense Refill     ondansetron (ZOFRAN-ODT) 4 MG ODT tab 1-2 tabs po prn every 6 hours 90 tablet 2     diclofenac (VOLTAREN) 1 % GEL topical gel Apply 4 grams to knees or 2 grams to hands four times daily using enclosed dosing card. 100 g 1     tamoxifen (NOLVADEX) 20 MG tablet Take 1 tablet (20 mg) by mouth daily 90 tablet 3     Coenzyme Q10 (COQ10) 100 MG  CAPS Take 2 capsules (200 mg) by mouth daily 100 capsule 1     Omega-3 Fatty Acids (OMEGA-3 FISH OIL PO) Take 1 capsule by mouth daily        gabapentin (NEURONTIN) 300 MG capsule 1-2 tablets EVERY 8 HOURS 400 capsule 3     metoprolol (TOPROL-XL) 25 MG 24 hr tablet Take 1 tablet (25 mg) by mouth daily 90 tablet 3     amLODIPine (NORVASC) 10 MG tablet Take 1 tablet (10 mg) by mouth daily AS DIRECTED 90 tablet 3     SYNTHROID 112 MCG tablet Take 1/2 tablet daily 90 tablet 3     atorvastatin (LIPITOR) 40 MG tablet Take 1 tablet (40 mg) by mouth daily Please put on profile- pt just received 90-day supply of cholesterol meds 90 tablet 3     triamterene-hydrochlorothiazide (MAXZIDE-25) 37.5-25 MG per tablet Take 1 tablet by mouth daily 90 tablet 3     HYDROcodone-acetaminophen (NORCO) 5-325 MG per tablet Take 1 tablet by mouth every 6 hours as needed 20 tablet 0     LORazepam (ATIVAN) 2 MG tablet Take 1 tablet at night for sleep 30 tablet 1     cholecalciferol (VITAMIN D) 1000 UNIT tablet Take 1 tablet (1,000 Units) by mouth daily 100 tablet 3     cholecalciferol (VITAMIN D3) 5000 UNITS CAPS capsule Take 1 capsule (5,000 Units) by mouth daily 120 capsule 3     triamcinolone (KENALOG) 0.1 % ointment Apply topically 2 times daily 30 g 1     Vaginal Lubricant (REPHRESH) GEL Place 2 g vaginally every 3 days 14 Box 3     aspirin 81 MG tablet Take 1 tablet by mouth daily Reported on 3/24/2017       Multiple Vitamin (MULTI-VITAMIN) per tablet Take 1 tablet by mouth daily.       [DISCONTINUED] amLODIPine (NORVASC) 10 MG tablet Take 1 tablet (10 mg) by mouth daily 10 tablet 0     Allergies   Allergen Reactions     Penicillins Hives         Review of Systems:  -Constitutional: The patient denies fatigue, fevers, chills, unintended weight loss, and night sweats.  -HEENT: Patient denies nonhealing oral sores.  -Skin: As above in HPI. No additional skin concerns.    Physical exam:  Vitals: There were no vitals taken for this  visit.  GEN: This is a well developed, well-nourished female in no acute distress, in a pleasant mood.    SKIN: Full skin, which includes the head/face, both arms, chest, back, abdomen,both legs, genitalia and/or groin buttocks, digits and/or nails, was examined. Patient's skin shows evidence of prolonged ultraviolet light exposure.  Color is dull; there are deep and superficial furrows and wrinkles, particularly in sun-exposed areas of malar cheeks and temples, as well as the presence of increased numbers of telangiectasias, hyper- and hypopigmented macules, and as well decreased cutaneous elasticity.  -Non-pitting edema of the left arm  -Scars on chest from past bilateral mastectomy  - Patch of telangiectasias on the central chest in well defined linear distribution from past radiation  - Scattered pink- red papules   - 2-3mm nodule right inguinal fold, inferior to posterior labia majora, consistent with EIC  - Scale between 4th and 5th digits of toe web spaces  - Thickened dystrophic yellow left great toe, with horizontal ridge present. Proximal to horizontal ridge, nail appears clear and healthy.   - Scattered pigmetned macules and papules, w/ regular borders and all <6mm, consistent with clinically benign appearing nevi  - Multiple waxy, stuck on appearing flat topped macules with sharp borders over chest, back and abdomen. Some of these are raised, more papular and with a faint erythematous base and excoriated top.  - diffuse faint erythema and scale over scalp.   -No other lesions of concern on areas examined.     Impression/Plan:  1. History of SCC of the perineum    No evidence of recurrence on limited examination today, the patient follows with gynecology for examination as well.     2. Seborrheic keratosis, inflamed. Benign nature was discussed, however symptomatic as many are irritated, occasionally bleed and itch.     Cryotherapy procedure note (performed by faculty): After verbal consent and discussion  of risks and benefits including but no limited to dyspigmentation/scar, blister, infection, recurrence, 8 irritated lesions over chest and back was(were) treated with 1-2mm freeze border for 2 cycles with liquid nitrogen. Post cryotherapy instructions were provided.     3. Benign skin findings include EIC, cherry angiomas, benign appearing nevi, and radiation induced telangiectasias    Benign nature was discussed.No further intervention required at this time.     4. Tinea pedis. Previously suspected onychomycosis given yellow discoloration of the toenails, however these only involved the 1st and 5th toenails of each foot. Pt reports that the 5th toenail involvement completely resolved without treatment, and her 1st toe is improving a longitudinal ridge, this is suggestive of trauma-induced onychodystrophy     Recommended OTC lotrimin for tinea pedis and to prevent nail involvement    Discussed PO Terbinafine, however will not prescribe on this visit given improvement in absence of oral or topical therapy    5. Seborrheic dermatitis -   - 2% ketoconazole and Head and shoulders, alternating daily    CC Dr. Quiñonez on close of this encounter.  Follow-up in 1 year, earlier for new or changing lesions.     Staff Involved:  Scribed by Quang Leone MS3 for Dr. Wilkerson.      Staff attestation:  The documentation recorded by the scribe accurately reflects the services I personally performed and the decisions I personally made.    Abe Wilkerson MD  Staff Dermatologist    Department of Dermatology

## 2017-04-17 ENCOUNTER — ONCOLOGY VISIT (OUTPATIENT)
Dept: ONCOLOGY | Facility: CLINIC | Age: 67
End: 2017-04-17
Attending: INTERNAL MEDICINE
Payer: COMMERCIAL

## 2017-04-17 VITALS
BODY MASS INDEX: 29.51 KG/M2 | TEMPERATURE: 99.6 F | OXYGEN SATURATION: 97 % | WEIGHT: 188 LBS | RESPIRATION RATE: 16 BRPM | HEART RATE: 94 BPM | HEIGHT: 67 IN | SYSTOLIC BLOOD PRESSURE: 141 MMHG | DIASTOLIC BLOOD PRESSURE: 88 MMHG

## 2017-04-17 DIAGNOSIS — R42 DIZZINESS: ICD-10-CM

## 2017-04-17 DIAGNOSIS — C50.919 MALIGNANT NEOPLASM OF FEMALE BREAST, UNSPECIFIED LATERALITY, UNSPECIFIED SITE OF BREAST: ICD-10-CM

## 2017-04-17 DIAGNOSIS — C50.919 MALIGNANT NEOPLASM OF FEMALE BREAST, UNSPECIFIED LATERALITY, UNSPECIFIED SITE OF BREAST: Primary | ICD-10-CM

## 2017-04-17 LAB
BASOPHILS # BLD AUTO: 0 10E9/L (ref 0–0.2)
BASOPHILS NFR BLD AUTO: 0.3 %
DIFFERENTIAL METHOD BLD: NORMAL
EOSINOPHIL # BLD AUTO: 0.1 10E9/L (ref 0–0.7)
EOSINOPHIL NFR BLD AUTO: 1.9 %
ERYTHROCYTE [DISTWIDTH] IN BLOOD BY AUTOMATED COUNT: 14.8 % (ref 10–15)
HCT VFR BLD AUTO: 37.7 % (ref 35–47)
HGB BLD-MCNC: 12.2 G/DL (ref 11.7–15.7)
IMM GRANULOCYTES # BLD: 0.1 10E9/L (ref 0–0.4)
IMM GRANULOCYTES NFR BLD: 1 %
LYMPHOCYTES # BLD AUTO: 2.1 10E9/L (ref 0.8–5.3)
LYMPHOCYTES NFR BLD AUTO: 29.8 %
MCH RBC QN AUTO: 27.7 PG (ref 26.5–33)
MCHC RBC AUTO-ENTMCNC: 32.4 G/DL (ref 31.5–36.5)
MCV RBC AUTO: 86 FL (ref 78–100)
MONOCYTES # BLD AUTO: 0.5 10E9/L (ref 0–1.3)
MONOCYTES NFR BLD AUTO: 7.1 %
NEUTROPHILS # BLD AUTO: 4.1 10E9/L (ref 1.6–8.3)
NEUTROPHILS NFR BLD AUTO: 59.9 %
NRBC # BLD AUTO: 0 10*3/UL
NRBC BLD AUTO-RTO: 0 /100
PLATELET # BLD AUTO: 234 10E9/L (ref 150–450)
RBC # BLD AUTO: 4.4 10E12/L (ref 3.8–5.2)
VIT B12 SERPL-MCNC: 1272 PG/ML (ref 193–986)
WBC # BLD AUTO: 6.9 10E9/L (ref 4–11)

## 2017-04-17 PROCEDURE — 36415 COLL VENOUS BLD VENIPUNCTURE: CPT | Performed by: INTERNAL MEDICINE

## 2017-04-17 PROCEDURE — 82306 VITAMIN D 25 HYDROXY: CPT | Performed by: INTERNAL MEDICINE

## 2017-04-17 PROCEDURE — 99212 OFFICE O/P EST SF 10 MIN: CPT | Mod: ZF

## 2017-04-17 PROCEDURE — 82607 VITAMIN B-12: CPT | Performed by: INTERNAL MEDICINE

## 2017-04-17 PROCEDURE — 85025 COMPLETE CBC W/AUTO DIFF WBC: CPT | Performed by: INTERNAL MEDICINE

## 2017-04-17 PROCEDURE — 99214 OFFICE O/P EST MOD 30 MIN: CPT | Mod: ZP | Performed by: INTERNAL MEDICINE

## 2017-04-17 ASSESSMENT — ENCOUNTER SYMPTOMS
BOWEL INCONTINENCE: 0
RECTAL BLEEDING: 0
SPEECH CHANGE: 0
EYE WATERING: 0
POSTURAL DYSPNEA: 0
PALPITATIONS: 1
BLOATING: 1
SHORTNESS OF BREATH: 0
NUMBNESS: 1
BLOOD IN STOOL: 0
WEIGHT GAIN: 0
SLEEP DISTURBANCES DUE TO BREATHING: 0
DECREASED CONCENTRATION: 0
DIZZINESS: 1
FATIGUE: 1
PARALYSIS: 0
BACK PAIN: 1
COUGH: 1
ALTERED TEMPERATURE REGULATION: 1
FLANK PAIN: 0
DECREASED LIBIDO: 1
WEIGHT LOSS: 1
LIGHT-HEADEDNESS: 1
NECK PAIN: 1
SPUTUM PRODUCTION: 1
NAUSEA: 1
SNORES LOUDLY: 0
HEMATURIA: 0
SMELL DISTURBANCE: 0
HEMOPTYSIS: 0
POLYPHAGIA: 0
CONSTIPATION: 1
EYE REDNESS: 0
DECREASED APPETITE: 1
INCREASED ENERGY: 1
HOT FLASHES: 1
DEPRESSION: 0
MYALGIAS: 1
DYSPNEA ON EXERTION: 0
NAIL CHANGES: 0
EYE PAIN: 0
LOSS OF CONSCIOUSNESS: 0
TROUBLE SWALLOWING: 0
HEARTBURN: 1
TREMORS: 0
INSOMNIA: 1
TACHYCARDIA: 1
DIARRHEA: 0
ORTHOPNEA: 0
HYPERTENSION: 1
RESPIRATORY PAIN: 0
WHEEZING: 0
JAUNDICE: 0
LEG PAIN: 0
CHILLS: 0
BRUISES/BLEEDS EASILY: 1
HEADACHES: 0
MUSCLE WEAKNESS: 0
ARTHRALGIAS: 1
MUSCLE CRAMPS: 1
SKIN CHANGES: 0
SINUS CONGESTION: 1
SWOLLEN GLANDS: 1
JOINT SWELLING: 1
POOR WOUND HEALING: 0
EYE IRRITATION: 0
ABDOMINAL PAIN: 1
SYNCOPE: 0
TASTE DISTURBANCE: 0
DISTURBANCES IN COORDINATION: 1
HALLUCINATIONS: 0
VOMITING: 1
LEG SWELLING: 1
DYSURIA: 0
COUGH DISTURBING SLEEP: 0
EXERCISE INTOLERANCE: 1
RECTAL PAIN: 0
DIFFICULTY URINATING: 0
MEMORY LOSS: 1
SEIZURES: 0
NERVOUS/ANXIOUS: 0
DOUBLE VISION: 0
WEAKNESS: 1
TINGLING: 1
SINUS PAIN: 0
HOARSE VOICE: 1
PANIC: 0
STIFFNESS: 1
NECK MASS: 0
CLAUDICATION: 1
NIGHT SWEATS: 1
POLYDIPSIA: 1
FEVER: 1
SORE THROAT: 1
HYPOTENSION: 0

## 2017-04-17 ASSESSMENT — PAIN SCALES - GENERAL: PAINLEVEL: MODERATE PAIN (4)

## 2017-04-17 NOTE — LETTER
4/17/2017       RE: Nadira Perez  [address]  [facility] [address]     Dear Colleague,    Thank you for referring your patient, Nadira Perez, to the [facility]. Please see a copy of my visit note below.    [facility]  ONCOLOGY FOLLOW-UP VISIT NOTE    PATIENT NAME: Nadira Perez    YOB: 1950  MRN :[id number]  DATE OF VISIT: Apr 17, 2017     REASON FOR VISIT : breast cancer follow up.     HISTORY OF PRESENT ILLNESS : The patient is a 66-year-old woman who in 06/2007 had the onset of left-sided breast discomfort that extended into the axilla. She ultimately did undergo mammogram on 06/13/2007, which identified a mass at the 2:30 position in the left breast. Ultrasound also was notable for some skin and areolar complex thickening. She did undergo biopsy of the mass, and this was consistent with an infiltrating ductal carcinoma that was grade 2. The tumor was ER positive, HI positive, and HER2 negative by FISH. She also had a biopsy of left axillary lymph node versus the tail of the axilla, and this was consistent with metastatic carcinoma. Following the diagnosis the patient did have metastatic staging to include a PET CT scan. This demonstrated increased activity in the left breast as well as the lymph nodes, but was otherwise negative. There was also an MRI of the breast performed, and this demonstrated other lesions of the left breast, but no abnormalities on the right.     She initiated neoadjuvant chemotherapy for her inflammatory breast cancer on 07/13/2007. She received  for 3 cycles through 08/24/2007. This was followed by Taxotere for 3 cycles, which was administered 09/14/2007 through 10/26/2007. Following her chemotherapy she did undergo a left-sided mastectomy with axillary lymph node dissection on 11/20/2007 by Dr. Mauro Mei. The patient did have residual carcinoma with a 1.0 cm tumor as well as  associated DCIS. Thirteen of 33 lymph nodes were positive, the largest of which was 0.6 cm of tumor infiltration and included extracapsular extension. The patient also had a right-sided prophylactic mastectomy, which did not demonstrate any noninvasive or invasive carcinoma. Just prior to her surgery Ismael was placed on Arimidex (start date 11/15/2007). Following her surgery she did have some issues with left chest wall cellulitis as well as some lymphedema of the left upper extremity. She ultimately did go on to receive radiation therapy under the direction of Dr. Nghia Burch. She received radiation to the left chest wall at a dose of 6440 cGy, to the left supraclavicular fossa at a dose of 5040 cGy, and to the left internal mammary chain at a dose of 5080 cGy. Last dose of radiation was administered on 03/07/2008.     INTERVAL HISTORY:  Nadira is here for routine visit.  She has been doing fairly well except recently she has been noticing gait imbalance.   He has at neuropathy for long time, however she doesn't think this is related with it.  She denies having focal weakness, headache, diplopia, blurring of vision, nausea, speech difficulty, difficulty swallowing, or loss of consciousness. She denies dizziness or vertigo. She remains active and continues to work full-time.  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 She denies any new bone pain.  No new lump or bump.  She reports good appetite.  She has been experiencing occasional bruising more in the upper extremities. She continues on tamoxifen and reports no side effect.  No hot flashes.  No bleeding. No mood issues.  She has been experiencing insomnia.  She states that she takes Ativan infrequently which helps with her sleep , but she doesn't want to be dependent on it.        REVIEW OF SYSTEMS: 12 point ROS neg other than the symptoms noted above in the HPI.    PHYSICAL EXAMINATION:   /88  Pulse  "94  Temp 99.6  F (37.6  C) (Oral)  Resp 16  Ht 1.708 m (5' 7.24\")  Wt 85.3 kg (188 lb)  SpO2 97%  BMI 29.23 kg/m2  Wt Readings from Last 5 Encounters:   04/17/17 85.3 kg (188 lb)   03/24/17 89 kg (196 lb 4.8 oz)   12/22/16 91.6 kg (202 lb)   10/27/16 89.8 kg (198 lb)   10/17/16 91.9 kg (202 lb 8 oz)     GENERAL : Alert and oriented , not in distress .   HEENT: Normocephalic head.  Anicteric sclerae. Moist buccal mucosa. No oral ulceration. Pupils are equal and reactive bilaterally. Normal extraocular eye movements. No conjunctival injection.    NECK: Supple, no thyromegaly.   LYMPH:  No cervical, supraclavicular, axillary, epitrochlear, or inguinal lymphadenopathy.  BREAST: She is status post bilateral mastectomies without reconstruction. No chest wall mass or concerning lesions identified. Multiple chest telangectasias in the left side.  There are no dominant masses on palpation of the chest wall, along the mastectomy scars or into the axilla bilaterally.   LUNGS: Clear breath sounds bilaterally, no wheezing, no crackles.    CARDIOVASCULAR: S1 and S2 well heard, regular rate and rhythm, no murmur or gallop. JVP absent.    ABDOMEN: Soft, nontender, positive bowel sounds. No organomegaly was appreciated.  EXTREMITIES: No cyanosis, no clubbing, no edema.    SKIN: No skin rash, no purpura or petechiae.    NEUROLOGIC: Normal mental status. Alert and oriented .Cranial nerves II through XII grossly intact.  No focal weakness. Sensory examination grossly intact.  Normal coordination( normal finger-to-nose touching) .  Romberg`s sign is negative. She was observed while she was walking and no significant abnormalities noted. She did well on heel and toe walking. However , she is noted to have difficult tandem gait.    ASSESSMENT AND PLAN:  1. Inflammatory breast cancer, stage IIIC, the left breast, ER positive, HER2 negative, status post neoadjuvant chemotherapy followed by bilateral mastectomies. Chest wall " radiotherapy completed in 03/2008. She was on adjuvant Arimidex from November 2007 until November 2012, and then was switched to tamoxifen. She'll complete 10 years of adjuvant hormonal therapy in November 2017.  On today's examination she has no signs of recurrent disease.  We discussed continuing her tamoxifen for a total of 5 years through November 2017. She had high risk disease. We discussed the results of MA-17R trial that shows benefits of 10 years of treatment with AI and that ~80 % of patients enrolled in the trial had 5 years of treatment with tamoxifen prior to enrollment in the trial. We discussed the pros and cons of extended aromatase inhibitor therapy.  She is interested in going back to Arimidex to complete total of 10 years aromatase inhibitor therapy. However, we will need to repeat her DEXA scan sometimes in Fall 2017 before starting Arimidex since she has a history of osteoporosis. For now she'll continue tamoxifen until November 2017.     2. Gait imbalance : Patient reports that she has been experiencing difficulty with gait in the past few weeks.  On exam we noted that she has difficult tandem gait.  Otherwise Romberg`s sign was negative.   No focal weakness was appreciated.  We will do MRI of the brain to rule out possible brain metastases.     3. Osteoporosis. Last DEXA 12/13 in which she had improvement in her dexa scan from -2.5 to -1.8.  She sees Dr De Dios. We will order another DEXA scan prior to next visit. She will continue taking calcium and vitamin D.  Encouraged weight bearing exercises.    4. Vaginal dryness - on replens.       5. Insomnia.   Discussed good sleep hygiene. She has been taking Ativan on PRN basis which has been helping her.      Patient seen and plan discussed with Dr Louise .     Carmen Harris MD  Hematology and Oncology Fellow  [phone] (Pager)     Pt was seen and evaluated by me.  No evidence of disease recurrence.  She does have vague symptoms of  balance issues.  Discussed brain MRI.  Assuming this is negative, we will see her back in 6 months.    Khushbu Louise MD

## 2017-04-17 NOTE — MR AVS SNAPSHOT
After Visit Summary   4/17/2017    Nadira Perez    MRN: [id number]           Patient Information     Date Of Birth          1950        Visit Information        Provider Department      4/17/2017 3:00 PM Khushbu Louise MD [facility]        Today's Diagnoses     Malignant neoplasm of female breast, unspecified laterality, unspecified site of breast (H)    -  1    Dizziness           Follow-ups after your visit        Your next 10 appointments already scheduled     Apr 17, 2017  4:30 PM CDT   [facility] Lab Draw with  [facility] LAB DRAW   [facility] ([facility])    [address]  [facility] [address]   [phone]            Apr 21, 2017  3:00 PM CDT   MR BRAIN W/O & W CONTRAST with UUMR2   [facility] [place], MRI ([facility])    [address] [facility] [address] [phone]           Take your medicines as usual, unless your doctor tells you not to. Bring a list of your current medicines to your exam (including vitamins, minerals and over-the-counter drugs).  You will be given intravenous contrast for this exam. To prepare:   The day before your exam, drink extra fluids at least six 8-ounce glasses (unless your doctor tells you to restrict your fluids).   Have a blood test (creatinine test) within 30 days of your exam. Go to your clinic or Diagnostic Imaging Department for this test.  The MRI machine uses a strong magnet. Please wear clothes without metal (snaps, zippers). A sweatsuit works well, or we may give you a hospital gown.  Please remove any body piercings and hair extensions before you arrive. You will also remove watches, jewelry, hairpins, wallets, dentures, partial dental plates and hearing aids. You may wear contact lenses, and you may be able to wear your rings. We have a safe place to keep your personal items, but it is safer to  leave them at home.   **IMPORTANT** THE INSTRUCTIONS BELOW ARE ONLY FOR THOSE PATIENTS WHO HAVE BEEN TOLD THEY WILL RECEIVE SEDATION OR GENERAL ANESTHESIA DURING THEIR MRI PROCEDURE:  IF YOU WILL RECEIVE SEDATION (take medicine to help you relax during your exam):   You must get the medicine from your doctor before you arrive. Bring the medicine to the exam. Do not take it at home.   Arrive one hour early. Bring someone who can take you home after the test. Your medicine will make you sleepy. After the exam, you may not drive, take a bus or take a taxi by yourself.   No eating 8 hours before your exam. You may have clear liquids up until 4 hours before your exam. (Clear liquids include water, clear tea, black coffee and fruit juice without pulp.)  IF YOU WILL RECEIVE ANESTHESIA (be asleep for your exam):   Arrive 1 1/2 hours early. Bring someone who can take you home after the test. You may not drive, take a bus or take a taxi by yourself.   No eating 8 hours before your exam. You may have clear liquids up until 4 hours before your exam. (Clear liquids include water, clear tea, black coffee and fruit juice without pulp.)  Please call the Imaging Department at your exam site with any questions.            Jun 16, 2017 10:00 AM CDT   (Arrive by 9:45 AM)   SHORT with Bee Hudson RPH   [facility] Medication Therapy Management ([facility])    [address] [facility]  [address] [facility] [id number] [phone]            Oct 16, 2017 10:30 AM CDT   (Arrive by 10:15 AM)   Return Visit with Khushbu Louise MD   [facility] ([facility])    [address] [id number] [phone]              Future tests that were ordered for you today     Open Future Orders        Priority Expected Expires Ordered    Vitamin D deficiency screening Routine 4/17/2018 4/17/2018 4/17/2017    Vitamin B12 Routine 4/17/2018  "4/17/2018 4/17/2017    CBC with platelets differential Routine 4/17/2018 4/17/2018 4/17/2017    MRI Brain w & w/o contrast Routine  11/13/2017 4/17/2017            Who to contact     If you have questions or need follow up information about today's clinic visit or your schedule please contact [facility] directly at [phone].  Normal or non-critical lab and imaging results will be communicated to you by [employer], letter or phone within 4 business days after the clinic has received the results. If you do not hear from us within 7 days, please contact the clinic through [employer] or phone. If you have a critical or abnormal lab result, we will notify you by phone as soon as possible.  Submit refill requests through [employer] or call your pharmacy and they will forward the refill request to us. Please allow 3 business days for your refill to be completed.          Additional Information About Your Visit        [employer] Information     [employer] gives you secure access to your electronic health record. If you see a primary care provider, you can also send messages to your care team and make appointments. If you have questions, please call your primary care clinic.  If you do not have a primary care provider, please call [phone] and they will assist you.        Care EveryWhere ID     This is your Care EveryWhere ID. This could be used by other organizations to access your [place] medical records  [id number]        Your Vitals Were     Pulse Temperature Respirations Height Pulse Oximetry BMI (Body Mass Index)    94 99.6  F (37.6  C) (Oral) 16 1.708 m (5' 7.24\") 97% 29.23 kg/m2       Blood Pressure from Last 3 Encounters:   04/17/17 141/88   03/24/17 136/82   12/22/16 124/80    Weight from Last 3 Encounters:   04/17/17 85.3 kg (188 lb)   03/24/17 89 kg (196 lb 4.8 oz)   12/22/16 91.6 kg (202 lb)                 Today's Medication Changes          These changes are accurate as of: 4/17/17  4:28 PM.  " If you have any questions, ask your nurse or doctor.               Stop taking these medicines if you haven't already. Please contact your care team if you have questions.     aspirin 81 MG tablet   Stopped by:  Khushbu Louise MD                    Primary Care Provider Office Phone # Fax #    MERCEDES Rivera [phone]       PRIMARY [facility]ARE CENTER [address] [place] [address]  [facility] [address]        Thank you!     Thank you for choosing [facility]  for your care. Our goal is always to provide you with excellent care. Hearing back from our patients is one way we can continue to improve our services. Please take a few minutes to complete the written survey that you may receive in the mail after your visit with us. Thank you!             Your Updated Medication List - Protect others around you: Learn how to safely use, store and throw away your medicines at www.disposemymeds.org.          This list is accurate as of: 4/17/17  4:28 PM.  Always use your most recent med list.                   Brand Name Dispense Instructions for use    amLODIPine 10 MG tablet    NORVASC    90 tablet    Take 1 tablet (10 mg) by mouth daily AS DIRECTED       atorvastatin 40 MG tablet    LIPITOR    90 tablet    Take 1 tablet (40 mg) by mouth daily Please put on profile- pt just received 90-day supply of cholesterol meds       * cholecalciferol 1000 UNIT tablet    vitamin D    100 tablet    Take 1 tablet (1,000 Units) by mouth daily       * cholecalciferol 5000 UNITS Caps capsule    vitamin D3    120 capsule    Take 1 capsule (5,000 Units) by mouth daily       CoQ10 100 MG Caps     100 capsule    Take 2 capsules (200 mg) by mouth daily       diclofenac 1 % Gel topical gel    VOLTAREN    100 g    Apply 4 grams to knees or 2 grams to hands four times daily using enclosed dosing card.       gabapentin 300 MG capsule    NEURONTIN    400 capsule    1-2 tablets EVERY 8 HOURS        HYDROcodone-acetaminophen 5-325 MG per tablet    NORCO    20 tablet    Take 1 tablet by mouth every 6 hours as needed       ketoconazole 2 % shampoo    NIZORAL    120 mL    Apply topically daily as needed for itching or irritation       LORazepam 2 MG tablet    ATIVAN    30 tablet    Take 1 tablet at night for sleep       metoprolol 25 MG 24 hr tablet    TOPROL-XL    90 tablet    Take 1 tablet (25 mg) by mouth daily       Multi-vitamin Tabs tablet   Generic drug:  multivitamin, therapeutic with minerals      Take 1 tablet by mouth daily.       OMEGA-3 FISH OIL PO      Take 1 capsule by mouth daily       ondansetron 4 MG ODT tab    ZOFRAN-ODT    90 tablet    1-2 tabs po prn every 6 hours       REPHRESH Gel     14 Box    Place 2 g vaginally every 3 days       SYNTHROID 112 MCG tablet   Generic drug:  levothyroxine     90 tablet    Take 1/2 tablet daily       tamoxifen 20 MG tablet    NOLVADEX    90 tablet    Take 1 tablet (20 mg) by mouth daily       terbinafine 1 % cream    lamISIL AT    12 g    Apply topically 2 times daily For fungal infection not resolved with other antifungals (e.g. Clotrimazole)       triamcinolone 0.1 % ointment    KENALOG    30 g    Apply topically 2 times daily       triamterene-hydrochlorothiazide 37.5-25 MG per tablet    MAXZIDE-25    90 tablet    Take 1 tablet by mouth daily       * Notice:  This list has 2 medication(s) that are the same as other medications prescribed for you. Read the directions carefully, and ask your doctor or other care provider to review them with you.

## 2017-04-17 NOTE — NURSING NOTE
"Nadira Perez is a 66 year old female who presents for:  Chief Complaint   Patient presents with     Oncology Clinic Visit     f/u breast ca        Initial Vitals:  /88  Pulse 94  Temp 99.6  F (37.6  C) (Oral)  Resp 16  Ht 1.708 m (5' 7.24\")  Wt 85.3 kg (188 lb)  SpO2 97%  BMI 29.23 kg/m2 Estimated body mass index is 29.23 kg/(m^2) as calculated from the following:    Height as of this encounter: 1.708 m (5' 7.24\").    Weight as of this encounter: 85.3 kg (188 lb).. Body surface area is 2.01 meters squared. BP completed using cuff size: large  Moderate Pain (4) No LMP recorded. Patient has had a hysterectomy. Allergies and medications reviewed.     Medications: Medication refills not needed today.  Pharmacy name entered into [employer]:    [facility] - [place], [place] - [address]  [facility] [place], [place] - [address]  [facility] [place] [phone]  [facility] [place] - [address] [facility] [place], [place] - [address]  [facility] [place], [place] - [address]  [facility] - USE FOR MAILING ONLY - IN[facility] HARTMANN    Comments:     5 minutes for nursing intake (face to face time)   Ricarda Singh CMA        "

## 2017-04-18 LAB — DEPRECATED CALCIDIOL+CALCIFEROL SERPL-MC: 60 UG/L (ref 20–75)

## 2017-04-19 NOTE — PROGRESS NOTES
[facility]  ONCOLOGY FOLLOW-UP VISIT NOTE    PATIENT NAME: Nadira Perez    YOB: 1950  MRN :[id number]  DATE OF VISIT: Apr 17, 2017     REASON FOR VISIT : breast cancer follow up.     HISTORY OF PRESENT ILLNESS : The patient is a 66-year-old woman who in 06/2007 had the onset of left-sided breast discomfort that extended into the axilla. She ultimately did undergo mammogram on 06/13/2007, which identified a mass at the 2:30 position in the left breast. Ultrasound also was notable for some skin and areolar complex thickening. She did undergo biopsy of the mass, and this was consistent with an infiltrating ductal carcinoma that was grade 2. The tumor was ER positive, WY positive, and HER2 negative by FISH. She also had a biopsy of left axillary lymph node versus the tail of the axilla, and this was consistent with metastatic carcinoma. Following the diagnosis the patient did have metastatic staging to include a PET CT scan. This demonstrated increased activity in the left breast as well as the lymph nodes, but was otherwise negative. There was also an MRI of the breast performed, and this demonstrated other lesions of the left breast, but no abnormalities on the right.     She initiated neoadjuvant chemotherapy for her inflammatory breast cancer on 07/13/2007. She received  for 3 cycles through 08/24/2007. This was followed by Taxotere for 3 cycles, which was administered 09/14/2007 through 10/26/2007. Following her chemotherapy she did undergo a left-sided mastectomy with axillary lymph node dissection on 11/20/2007 by Dr. Mauro Mei. The patient did have residual carcinoma with a 1.0 cm tumor as well as associated DCIS. Thirteen of 33 lymph nodes were positive, the largest of which was 0.6 cm of tumor infiltration and included extracapsular extension. The patient also had a right-sided prophylactic mastectomy, which did not demonstrate any noninvasive  "or invasive carcinoma. Just prior to her surgery Ismael was placed on Arimidex (start date 11/15/2007). Following her surgery she did have some issues with left chest wall cellulitis as well as some lymphedema of the left upper extremity. She ultimately did go on to receive radiation therapy under the direction of Dr. Nghia Burch. She received radiation to the left chest wall at a dose of 6440 cGy, to the left supraclavicular fossa at a dose of 5040 cGy, and to the left internal mammary chain at a dose of 5080 cGy. Last dose of radiation was administered on 03/07/2008.     INTERVAL HISTORY:  Nadira is here for routine visit.  She has been doing fairly well except recently she has been noticing gait imbalance.   He has at neuropathy for long time, however she doesn't think this is related with it.  She denies having focal weakness, headache, diplopia, blurring of vision, nausea, speech difficulty, difficulty swallowing, or loss of consciousness. She denies dizziness or vertigo. She remains active and continues to work full-time.  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 She denies any new bone pain.  No new lump or bump.  She reports good appetite.  She has been experiencing occasional bruising more in the upper extremities. She continues on tamoxifen and reports no side effect.  No hot flashes.  No bleeding. No mood issues.  She has been experiencing insomnia.  She states that she takes Ativan infrequently which helps with her sleep , but she doesn't want to be dependent on it.        REVIEW OF SYSTEMS: 12 point ROS neg other than the symptoms noted above in the HPI.    PHYSICAL EXAMINATION:   /88  Pulse 94  Temp 99.6  F (37.6  C) (Oral)  Resp 16  Ht 1.708 m (5' 7.24\")  Wt 85.3 kg (188 lb)  SpO2 97%  BMI 29.23 kg/m2  Wt Readings from Last 5 Encounters:   04/17/17 85.3 kg (188 lb)   03/24/17 89 kg (196 lb 4.8 oz)   12/22/16 91.6 kg (202 lb) "   10/27/16 89.8 kg (198 lb)   10/17/16 91.9 kg (202 lb 8 oz)     GENERAL : Alert and oriented , not in distress .   HEENT: Normocephalic head.  Anicteric sclerae. Moist buccal mucosa. No oral ulceration. Pupils are equal and reactive bilaterally. Normal extraocular eye movements. No conjunctival injection.    NECK: Supple, no thyromegaly.   LYMPH:  No cervical, supraclavicular, axillary, epitrochlear, or inguinal lymphadenopathy.  BREAST: She is status post bilateral mastectomies without reconstruction. No chest wall mass or concerning lesions identified. Multiple chest telangectasias in the left side.  There are no dominant masses on palpation of the chest wall, along the mastectomy scars or into the axilla bilaterally.   LUNGS: Clear breath sounds bilaterally, no wheezing, no crackles.    CARDIOVASCULAR: S1 and S2 well heard, regular rate and rhythm, no murmur or gallop. JVP absent.    ABDOMEN: Soft, nontender, positive bowel sounds. No organomegaly was appreciated.  EXTREMITIES: No cyanosis, no clubbing, no edema.    SKIN: No skin rash, no purpura or petechiae.    NEUROLOGIC: Normal mental status. Alert and oriented .Cranial nerves II through XII grossly intact.  No focal weakness. Sensory examination grossly intact.  Normal coordination( normal finger-to-nose touching) .  Romberg`s sign is negative. She was observed while she was walking and no significant abnormalities noted. She did well on heel and toe walking. However , she is noted to have difficult tandem gait.    ASSESSMENT AND PLAN:  1. Inflammatory breast cancer, stage IIIC, the left breast, ER positive, HER2 negative, status post neoadjuvant chemotherapy followed by bilateral mastectomies. Chest wall radiotherapy completed in 03/2008. She was on adjuvant Arimidex from November 2007 until November 2012, and then was switched to tamoxifen. She'll complete 10 years of adjuvant hormonal therapy in November 2017.  On today's examination she has no signs of  recurrent disease.  We discussed continuing her tamoxifen for a total of 5 years through November 2017. She had high risk disease. We discussed the results of MA-17R trial that shows benefits of 10 years of treatment with AI and that ~80 % of patients enrolled in the trial had 5 years of treatment with tamoxifen prior to enrollment in the trial. We discussed the pros and cons of extended aromatase inhibitor therapy.  She is interested in going back to Arimidex to complete total of 10 years aromatase inhibitor therapy. However, we will need to repeat her DEXA scan sometimes in Fall 2017 before starting Arimidex since she has a history of osteoporosis. For now she'll continue tamoxifen until November 2017.     2. Gait imbalance : Patient reports that she has been experiencing difficulty with gait in the past few weeks.  On exam we noted that she has difficult tandem gait.  Otherwise Romberg`s sign was negative.   No focal weakness was appreciated.  We will do MRI of the brain to rule out possible brain metastases.     3. Osteoporosis. Last DEXA 12/13 in which she had improvement in her dexa scan from -2.5 to -1.8.  She sees Dr De Dios. We will order another DEXA scan prior to next visit. She will continue taking calcium and vitamin D.  Encouraged weight bearing exercises.    4. Vaginal dryness - on replens.       5. Insomnia.   Discussed good sleep hygiene. She has been taking Ativan on PRN basis which has been helping her.      Patient seen and plan discussed with Dr Louise .     Carmen Harris MD  Hematology and Oncology Fellow  [phone] (Pager)     Pt was seen and evaluated by me.  No evidence of disease recurrence.  She does have vague symptoms of balance issues.  Discussed brain MRI.  Assuming this is negative, we will see her back in 6 months.    Khushbu Louise MD

## 2017-04-21 ENCOUNTER — HOSPITAL ENCOUNTER (OUTPATIENT)
Dept: MRI IMAGING | Facility: CLINIC | Age: 67
Discharge: HOME | End: 2017-04-21
Attending: INTERNAL MEDICINE | Admitting: INTERNAL MEDICINE
Payer: COMMERCIAL

## 2017-04-21 DIAGNOSIS — R42 DIZZINESS: ICD-10-CM

## 2017-04-21 DIAGNOSIS — C50.919 MALIGNANT NEOPLASM OF FEMALE BREAST, UNSPECIFIED LATERALITY, UNSPECIFIED SITE OF BREAST: ICD-10-CM

## 2017-04-21 PROCEDURE — 70553 MRI BRAIN STEM W/O & W/DYE: CPT

## 2017-04-21 PROCEDURE — 25500064 ZZH RX 255 OP 636: Performed by: INTERNAL MEDICINE

## 2017-04-21 PROCEDURE — A9585 GADOBUTROL INJECTION: HCPCS | Performed by: INTERNAL MEDICINE

## 2017-04-21 RX ORDER — GADOBUTROL 604.72 MG/ML
10 INJECTION INTRAVENOUS ONCE
Status: COMPLETED | OUTPATIENT
Start: 2017-04-21 | End: 2017-04-21

## 2017-04-21 RX ADMIN — GADOBUTROL 10 ML: 604.72 INJECTION INTRAVENOUS at 15:22

## 2017-06-16 ENCOUNTER — OFFICE VISIT (OUTPATIENT)
Dept: PHARMACY | Facility: CLINIC | Age: 67
End: 2017-06-16
Payer: COMMERCIAL

## 2017-06-16 VITALS
DIASTOLIC BLOOD PRESSURE: 78 MMHG | SYSTOLIC BLOOD PRESSURE: 128 MMHG | BODY MASS INDEX: 29.23 KG/M2 | WEIGHT: 188 LBS | HEART RATE: 88 BPM

## 2017-06-16 DIAGNOSIS — Z85.3 HISTORY OF BREAST CANCER IN FEMALE: ICD-10-CM

## 2017-06-16 DIAGNOSIS — E55.9 HYPOVITAMINOSIS D: ICD-10-CM

## 2017-06-16 DIAGNOSIS — E78.00 PURE HYPERCHOLESTEROLEMIA: ICD-10-CM

## 2017-06-16 DIAGNOSIS — K29.50 CHRONIC GASTRITIS WITHOUT BLEEDING, UNSPECIFIED GASTRITIS TYPE: ICD-10-CM

## 2017-06-16 DIAGNOSIS — G47.00 INSOMNIA, UNSPECIFIED TYPE: ICD-10-CM

## 2017-06-16 DIAGNOSIS — M25.50 MULTIPLE JOINT PAIN: ICD-10-CM

## 2017-06-16 DIAGNOSIS — E89.0 POSTOPERATIVE HYPOTHYROIDISM: ICD-10-CM

## 2017-06-16 DIAGNOSIS — M79.2 NEUROPATHIC PAIN: Primary | ICD-10-CM

## 2017-06-16 PROCEDURE — 99607 MTMS BY PHARM ADDL 15 MIN: CPT | Performed by: PHARMACIST

## 2017-06-16 PROCEDURE — 99606 MTMS BY PHARM EST 15 MIN: CPT | Performed by: PHARMACIST

## 2017-06-16 RX ORDER — GABAPENTIN 300 MG/1
CAPSULE ORAL
Qty: 540 CAPSULE | Refills: 0 | Status: SHIPPED | OUTPATIENT
Start: 2017-06-16 | End: 2017-08-22

## 2017-06-16 NOTE — MR AVS SNAPSHOT
After Visit Summary   6/16/2017    Nadira Perez    MRN: [id number]           Patient Information     Date Of Birth          1950        Visit Information        Provider Department      6[facility]16/2017 10:[facility]        Today's Diagnoses     Neuropathic pain    -  1      Care Instructions    Recommendations from today's MTM visit:                                                      1. Vit D USP. Ask Matilde if she's willing to check vitamin D annually at your annual visit.    2. CoQ10 200mg is a reasonable dose daily.    3. Diclofenac gel for pain benefits likely outweigh risks. Matt will look into hearing loss with NSAIDs and Tylenol.    4. Look into whether you would be open to trying trazodone instead of lorazepam, as this would be less likely to be an issue as you move into the 65+ age group.    5. Shoot me a mychart with the total fish oil content and dha and epa content.    6. You likely would be fine going to generic levothyroxine. Check between 8 weeks and 6 months.    Next MTM visit: I will send you a letter in 6 months    To schedule another MTM appointment, please call the clinic directly or you may call the MTM scheduling line at [phone] or toll-free at [phone].     My Clinical Pharmacist's contact information:                                                      It was a pleasure seeing you today!  Please feel free to contact me with any questions or concerns you have.      Bee Hudson, Alina  Medication Therapy Management Provider  Phone:[phone]  Pager: [phone]    You may receive a survey about the MTM services you received.  I would appreciate your feedback to help me serve you better in the future. Please fill it out and return it when you can. Your comments will be anonymous.                    Follow-ups after your visit        Your next 10 appointments already scheduled     Oct 09, 2017  10:30 AM CDT   (Arrive by 10:15 AM)   Return Visit with Khushbu Louise MD   [facility]    [address]  [address] [facility] [address] [phone]              Who to contact     If you have questions or need follow up information about today's clinic visit or your schedule please contact [facility] MEDICATION THERAPY MANAGEMENT directly at [phone].  Normal or non-critical lab and imaging results will be communicated to you by Fisochart, letter or phone within 4 business days after the clinic has received the results. If you do not hear from us within 7 days, please contact the clinic through Fisochart or phone. If you have a critical or abnormal lab result, we will notify you by phone as soon as possible.  Submit refill requests through ALICE App or call your pharmacy and they will forward the refill request to us. Please allow 3 business days for your refill to be completed.          Additional Information About Your Visit        [employer] Information     [employer] App gives you secure access to your electronic health record. If you see a primary care provider, you can also send messages to your care team and make appointments. If you have questions, please call your primary care clinic.  If you do not have a primary care provider, please call [phone] and they will assist you.        Care EveryWhere ID     This is your Care EveryWhere ID. This could be used by other organizations to access your [facility] medical records  [id number]        Your Vitals Were     Pulse BMI (Body Mass Index)                88 29.23 kg/m2           Blood Pressure from Last 3 Encounters:   06/16/17 128/78   04/17/17 141/88   03/24/17 136/82    Weight from Last 3 Encounters:   06/16/17 188 lb (85.3 kg)   04/17/17 188 lb (85.3 kg)   03/24/17 196 lb 4.8 oz (89 kg)              Today, you had the following     No orders found for display       Primary Care Provider  Office Phone # Fax #    MERCEDES Rivera [phone]       [facility] [address] [place] [address] [phone]  [facility] [address]        Thank you!     Thank you for choosing [facility] MEDICATION THERAPY MANAGEMENT  for your care. Our goal is always to provide you with excellent care. Hearing back from our patients is one way we can continue to improve our services. Please take a few minutes to complete the written survey that you may receive in the mail after your visit with us. Thank you!             Your Updated Medication List - Protect others around you: Learn how to safely use, store and throw away your medicines at www.disposemymeds.org.          This list is accurate as of: 6/16/17 10:37 AM.  Always use your most recent med list.                   Brand Name Dispense Instructions for use    amLODIPine 10 MG tablet    NORVASC    90 tablet    Take 1 tablet (10 mg) by mouth daily AS DIRECTED       atorvastatin 40 MG tablet    LIPITOR    90 tablet    Take 1 tablet (40 mg) by mouth daily Please put on profile- pt just received 90-day supply of cholesterol meds       * cholecalciferol 1000 UNIT tablet    vitamin D    100 tablet    Take 1 tablet (1,000 Units) by mouth daily       * cholecalciferol 5000 UNITS Caps capsule    vitamin D3    120 capsule    Take 1 capsule (5,000 Units) by mouth daily       CoQ10 100 MG Caps     100 capsule    Take 1 capsule by mouth daily       diclofenac 1 % Gel topical gel    VOLTAREN    100 g    Apply 4 grams to knees or 2 grams to hands four times daily using enclosed dosing card.       gabapentin 300 MG capsule    NEURONTIN    400 capsule    1-2 tablets EVERY 8 HOURS       HYDROcodone-acetaminophen 5-325 MG per tablet    NORCO    20 tablet    Take 1 tablet by mouth every 6 hours as needed       ketoconazole 2 % shampoo    NIZORAL    120 mL    Apply topically daily as needed for itching or irritation       LORazepam 2 MG tablet    ATIVAN    30 tablet    Take 1  tablet at night for sleep       metoprolol 25 MG 24 hr tablet    TOPROL-XL    90 tablet    Take 1 tablet (25 mg) by mouth daily       Multi-vitamin Tabs tablet   Generic drug:  multivitamin, therapeutic with minerals      Take 1 tablet by mouth daily.       OMEGA-3 FISH OIL PO      Take 1 capsule by mouth daily       omeprazole 20 MG CR capsule    priLOSEC     Take 20 mg by mouth daily       ondansetron 4 MG ODT tab    ZOFRAN-ODT    90 tablet    1-2 tabs po prn every 6 hours       REPHRESH Gel     14 Box    Place 2 g vaginally every 3 days       SYNTHROID 112 MCG tablet   Generic drug:  levothyroxine     90 tablet    Take 1/2 tablet daily       tamoxifen 20 MG tablet    NOLVADEX    90 tablet    Take 1 tablet (20 mg) by mouth daily       terbinafine 1 % cream    lamISIL AT    12 g    Apply topically 2 times daily For fungal infection not resolved with other antifungals (e.g. Clotrimazole)       triamcinolone 0.1 % ointment    KENALOG    30 g    Apply topically 2 times daily       triamterene-hydrochlorothiazide 37.5-25 MG per tablet    MAXZIDE-25    90 tablet    Take 1 tablet by mouth daily       * Notice:  This list has 2 medication(s) that are the same as other medications prescribed for you. Read the directions carefully, and ask your doctor or other care provider to review them with you.

## 2017-06-16 NOTE — PATIENT INSTRUCTIONS
Recommendations from today's MTM visit:                                                      1. Vit D USP. Ask Matilde if she's willing to check vitamin D annually at your annual visit.    2. CoQ10 200mg is a reasonable dose daily.    3. Diclofenac gel for pain benefits likely outweigh risks. Matt will look into hearing loss with NSAIDs and Tylenol.    4. Look into whether you would be open to trying trazodone instead of lorazepam, as this would be less likely to be an issue as you move into the 65+ age group.    5. Shoot me a mychart with the total fish oil content and dha and epa content.    6. You likely would be fine going to generic levothyroxine. Check between 8 weeks and 6 months.    Next MTM visit: I will send you a letter in 6 months    To schedule another MTM appointment, please call the clinic directly or you may call the MTM scheduling line at [phone] or toll-free at [phone].     My Clinical Pharmacist's contact information:                                                      It was a pleasure seeing you today!  Please feel free to contact me with any questions or concerns you have.      Bee Hudson, PharmD  Medication Therapy Management Provider  Phone:[phone]  Pager: [phone]    You may receive a survey about the MTM services you received.  I would appreciate your feedback to help me serve you better in the future. Please fill it out and return it when you can. Your comments will be anonymous.

## 2017-06-16 NOTE — PROGRESS NOTES
SUBJECTIVE/OBJECTIVE:                Nadira Perez is a 66 year old female coming in for a follow-up visit for Medication Therapy Management.      Chief Complaint: Follow up from our visit on 3/24/17.    Tobacco: No tobacco use   Alcohol: not currently using    Medication Adherence: no issues reported    Insomnia: Pt occasionally uses lorazepam for sleep, maybe once or twice a month. Pt reports no side effects. She has not tried other options for sleep recently.    Left Wrist Pain/Neuropathy: Pt is taking APAP 1000mg TID, gabapentin 600mg morning, 300mg noon and evening. She has tried to taper down on it because she thought we were trying to get her off of it. Pt is not taking diclofenac because she is worried about the recent reports of increased hearing loss with ibuprofen use. Pt has not been using hydrocodone/APAP much at all recently but wants to keep it on hand for breakthrough pain. Pt also stopped ASA use for pain.     Supplements: Pt is currently taking Vitamin D 6000 IU daily. Pt reports no side effects. Pt is wondering if there is a particular brand or location to get the best vitamin D. Pt was taking both a MVI and B complex and has recently discontinued the B complex when her B12 level came back high.    Hyperlipidemia: Current therapy includes atorvastatin 40 mg once daily, Fish Oil daily and CoQ10.  Pt reports no significant myalgias or other side effects. Pt is wondering if she can come off her statin medication if diet and exercise improve.    Gastritis: Current medications include: Prilosec (omeprazole) 20mg once daily. Pt is taking this for gastritis seen on endoscopy and hopes to take it for 3 months and then discontinue. She has been on it for 1 month and is doing well. She tried ranitidine for a few months but didn't feel that her symptoms were clearing so her oncologist suggested 3 months of PPIs. Patient feels that current regimen is effective. Pt is not needing Zofran regularly now that  her gastritis is under better control.    Hypothyroidism: Patient is taking Synthroid 112 mcg daily. Patient is having the following symptoms: none. Pt is wondering whether generic levothyroxine would be an option, as she is currently on brand name Synthroid.    Hx of Ductal Carcinoma: Current medication includes tamoxifen 20 mg daily and reports no side effects. Pt is wondering our opinion on going back on Arimidex, as her 5 years of tamoxifen is almost up. She was on Arimidex for 5 years prior to tamoxifen therapy.    Current labs include:  BP Readings from Last 3 Encounters:   04/17/17 141/88   03/24/17 136/82   12/22/16 124/80     Today's Vitals: Wt 188 lb (85.3 kg)  BMI 29.23 kg/m2  Lab Results   Component Value Date    A1C 6.0 10/27/2016   .  Lab Results   Component Value Date    CHOL 208 10/27/2016     Lab Results   Component Value Date    TRIG 182 10/27/2016     Lab Results   Component Value Date    HDL 56 10/27/2016     Lab Results   Component Value Date     10/27/2016       Liver Function Studies -   Recent Labs   Lab Test  09/15/16   1411   PROTTOTAL  7.5   ALBUMIN  4.3   BILITOTAL  0.4   ALKPHOS  70   AST  30   ALT  39       Lab Results   Component Value Date    UCRR 49 12/22/2008       Last Basic Metabolic Panel:  Lab Results   Component Value Date     09/15/2016      Lab Results   Component Value Date    POTASSIUM 3.0 09/15/2016     Lab Results   Component Value Date    CHLORIDE 102 09/15/2016     Lab Results   Component Value Date    BUN 13 09/15/2016     Lab Results   Component Value Date    CR 0.55 09/15/2016     GFR Estimate   Date Value Ref Range Status   09/15/2016 >90  Non  GFR Calc   >60 mL/min/1.7m2 Final   10/30/2015 >90  Non  GFR Calc   >60 mL/min/1.7m2 Final   11/06/2014 >90  Non  GFR Calc   >60 mL/min/1.7m2 Final     GFR Estimate If Black   Date Value Ref Range Status   09/15/2016 >90   GFR Calc   >60  mL/min/1.7m2 Final   10/30/2015 >90   GFR Calc   >60 mL/min/1.7m2 Final   11/06/2014 >90   GFR Calc   >60 mL/min/1.7m2 Final     TSH   Date Value Ref Range Status   10/27/2016 3.09 0.40 - 4.00 mU/L Final   ]    Most Recent Immunizations   Administered Date(s) Administered     Hepatitis A Vac Ped/Adol-2 Dose 10/26/2005     Hepatitis B 10/26/2005     Influenza (IIV3) 10/01/2014     Influenza Intranasal Vaccine 4 valent 10/02/2015     Influenza Vaccine IM 3yrs+ 4 Valent IIV4 10/13/2016     Pneumococcal 23 valent 10/01/2008     TD (ADULT, 7+) 04/26/2005     TDAP Vaccine (Boostrix) 11/06/2012     Typhoid IM 04/26/2005     Zoster vaccine, live 11/14/2012       ASSESSMENT:              Current medications were reviewed today as discussed above.      Medication Adherence: no issues identified    Insomnia: May need improvement. For long term, pt would benefit from discontinuatiion of lorazpam due to increase risk of dementia and falls in pts over 65. Pt may benefit from a trial of trazodone for sleep.     Left Wrist Pain/Neuropathy: Needs improvement. Pt use of topical diclofenac for pain likely outweighs her risk of hearing loss from the medication. Topical medications have less systemic effects compared to oral meds.     Supplements: Stable. Vit D levels are WNL. At this time, pt is unwilling to reduce her Vit D dose. Pt will talk to PCP about annual scheduled Vit D levels check. Recommended for pt to use USP certified OTC Vit D.     Hyperlipidemia: Stable. Recommended for pt to continue statin therapy for cardiovascular protection.     Gastritis: Stable. Pt is aware of the side effects of chronic omperazole use. Current indication is appropriate.    Hypothyroidism: Stable. The pt to discuss switching from brand name Synthroid to generic levothyroxine with her PCP. Both medications are bioequivalent and can be switched. Due to the narrow therapeutic index of the medications the pt would  benefit from a recheck of her TSH and T4 levels between 8 weeks and 6 months of the switch to verify efficacy.     Hx of Ductal Carcinoma: Stable. Advised pt to follow the recommendation provided by her oncologist.     PLAN:                  Pt to...  1. Talk to PCP about switching from brand name Synthroid to generic Levothyroxine     2. Talk to PCP about use of Trazodone for sleep.     3. Resume use of diclofenac gel as needed.    4. Consider a trial of trazodone for sleep in the future.    5. Consider going to generic levothyroxine with her physician.    I spent 40 minutes with this patient today.  All changes were made via collaborative practice agreement with Matilde Quiñonez A copy of the visit note was provided to the patient's primary care provider.     Will follow up in 6 months, sooner if needed.    The patient was given a summary of these recommendations as an after visit summary.    Matt Siddiqi, 4th Year Pharmacy Student  Bee Hudson PharmD  Medication Therapy Management Provider  Phone:[phone]  Pager: [phone]

## 2017-07-31 ENCOUNTER — TELEPHONE (OUTPATIENT)
Dept: OTOLARYNGOLOGY | Facility: CLINIC | Age: 67
End: 2017-07-31

## 2017-07-31 NOTE — TELEPHONE ENCOUNTER
Pt called with concern for nodule on her neck and swallowing issues. Has had a previous surgery with Dr. Baldwin about 6 years ago. Suggest pt call the call center for appointment with Dr. Baldwin- pt may need to see PCP until appointment with Dr. Baldwin. Pt understood.  Chiquita Chong RN  ENT Care Coordinator   Otology  [phone]  7/31/2017 8:48 AM

## 2017-08-01 ENCOUNTER — OFFICE VISIT (OUTPATIENT)
Dept: INTERNAL MEDICINE | Facility: CLINIC | Age: 67
End: 2017-08-01

## 2017-08-01 VITALS
DIASTOLIC BLOOD PRESSURE: 80 MMHG | HEART RATE: 85 BPM | TEMPERATURE: 99.3 F | SYSTOLIC BLOOD PRESSURE: 125 MMHG | WEIGHT: 195 LBS | BODY MASS INDEX: 30.32 KG/M2

## 2017-08-01 DIAGNOSIS — Z23 NEED FOR VACCINATION WITH 13-POLYVALENT PNEUMOCOCCAL CONJUGATE VACCINE: ICD-10-CM

## 2017-08-01 DIAGNOSIS — Z23 NEED FOR 23-POLYVALENT PNEUMOCOCCAL POLYSACCHARIDE VACCINE: ICD-10-CM

## 2017-08-01 DIAGNOSIS — R22.1 MASS OF NECK: Primary | ICD-10-CM

## 2017-08-01 RX ORDER — AMPICILLIN TRIHYDRATE 250 MG
1 CAPSULE ORAL DAILY
COMMUNITY
End: 2021-05-04

## 2017-08-01 ASSESSMENT — PAIN SCALES - GENERAL: PAINLEVEL: MODERATE PAIN (4)

## 2017-08-01 NOTE — PATIENT INSTRUCTIONS
Primary Care Center Medication Refill Request Information:  * Please contact your pharmacy regarding ANY request for medication refills.  ** [facility] Prescription Fax = [phone]  * Please allow 3 business days for routine medication refills.  * Please allow 5 business days for controlled substance medication refills.     Primary Care Center Test Result notification information:  *You will be notified within 7-10 days of your appointment day regarding the results of your test.  If you are on MyChart you will be notified as soon as the provider has reviewed the results and signed off on them.

## 2017-08-01 NOTE — NURSING NOTE
Chief Complaint   Patient presents with     Throat Problem     Pt states she has been having difficulty swallowing and also notes a lump to her throat.      Stefany Morales LPN August 1, 2017 1:20 PM

## 2017-08-01 NOTE — MR AVS SNAPSHOT
After Visit Summary   8/1/2017    Nadira Perez    MRN: [id number]           Patient Information     Date Of Birth          1950        Visit Information        Provider Department      8/1/2017 1:40 PM Matilde Quiñonez, APRN CNP [facility]        Today's Diagnoses     Mass of neck    -  1    Need for vaccination with 13-polyvalent pneumococcal conjugate vaccine        Need for 23-polyvalent pneumococcal polysaccharide vaccine          Care Instructions    Primary Care Center Medication Refill Request Information:  * Please contact your pharmacy regarding ANY request for medication refills.  ** [facility] Prescription Fax = [phone]  * Please allow 3 business days for routine medication refills.  * Please allow 5 business days for controlled substance medication refills.     Primary Care Center Test Result notification information:  *You will be notified within 7-10 days of your appointment day regarding the results of your test.  If you are on MyChart you will be notified as soon as the provider has reviewed the results and signed off on them.            Follow-ups after your visit        Your next 10 appointments already scheduled     Aug 01, 2017  4:00 PM CDT   US HEAD NECK SOFT TISSUE with UCUS3   [facility] ([facility])    [address] [place] [address] [phone]           Please bring a list of your medicines (including vitamins, minerals and over-the-counter drugs). Also, tell your doctor about any allergies you may have. Wear comfortable clothes and leave your valuables at home.  You do not need to do anything special to prepare for your exam.  Please call the Imaging Department at your exam site with any questions.            Oct 30, 2017 10:00 AM CDT   (Arrive by 9:45 AM)   Return Visit with Khushbu Louise MD   [facility] ([facility])    [facility]  [address] [address] [phone]              Future tests that were ordered for you today     Open Future Orders        Priority Expected Expires Ordered    PNEUMOCOCCAL VACCINE,ADULT,SQ OR IM Routine 8/1/2018 8/1/2019 8/1/2017     Head Neck Soft Tissue Routine 8/1/2017 8/1/2018 8/1/2017            Who to contact     Please call your clinic at [phone] to:    Ask questions about your health    Make or cancel appointments    Discuss your medicines    Learn about your test results    Speak to your doctor   If you have compliments or concerns about an experience at your clinic, or if you wish to file a complaint, please contact [facility] Patient Relations at [phone] or email us at [email or web address]         Additional Information About Your Visit        [employer] Information     [employer] gives you secure access to your electronic health record. If you see a primary care provider, you can also send messages to your care team and make appointments. If you have questions, please call your primary care clinic.  If you do not have a primary care provider, please call [phone] and they will assist you.      [employer] is an electronic gateway that provides easy, online access to your medical records. With [employer], you can request a clinic appointment, read your test results, renew a prescription or communicate with your care team.     To access your existing account, please contact your [facility] or call [phone] for assistance.        Care EveryWhere ID     This is your Care EveryWhere ID. This could be used by other organizations to access your [facility] medical records  [id number]        Your Vitals Were     Pulse Temperature BMI (Body Mass Index)             85 99.3  F (37.4  C) (Oral) 30.32 kg/m2          Blood Pressure from Last 3 Encounters:   08/01/17 125/80   06/16/17 128/78   04/17/17 141/88     Weight from Last 3 Encounters:   08/01/17 88.5 kg (195 lb)   06/16/17 85.3 kg (188 lb)   04/17/17 85.3 kg (188 lb)              We Performed the Following     PNEUMOCOCCAL CONJ VACCINE 13 VALENT IM        Primary Care Provider Office Phone # Fax #    MERCEDES Rivera [phone]       PRIMARY CARE CENTER [address] [place] [address] 741  [facility] [address]        Equal Access to Services     EDEL LAMA : Hadii aad ku hadasho Soomaali, waaxda luqadaha, qaybta kaalmada adeegyada, waxay idiin hayaan adeeg kharash la'juan . So [facility] [phone].    ATENCIÓN: Si habla español, tiene a davis disposición servicios gratuitos de asistencia lingüística. [facility] [phone].    We comply with applicable federal civil rights laws and [place] laws. We do not discriminate on the basis of race, color, national origin, age, disability sex, sexual orientation or gender identity.            Thank you!     Thank you for choosing [facility]  for your care. Our goal is always to provide you with excellent care. Hearing back from our patients is one way we can continue to improve our services. Please take a few minutes to complete the written survey that you may receive in the mail after your visit with us. Thank you!             Your Updated Medication List - Protect others around you: Learn how to safely use, store and throw away your medicines at www.disposemymeds.org.          This list is accurate as of: 8/1/17  2:11 PM.  Always use your most recent med list.                   Brand Name Dispense Instructions for use Diagnosis    amLODIPine 10 MG tablet    NORVASC    90 tablet    Take 1 tablet (10 mg) by mouth daily AS DIRECTED    Essential hypertension, benign       atorvastatin 40 MG tablet    LIPITOR    90 tablet    Take 1 tablet (40 mg) by mouth daily Please put on profile- pt just received 90-day supply of cholesterol meds    Pure hypercholesterolemia       * cholecalciferol 1000 UNIT  tablet    vitamin D    100 tablet    Take 1 tablet (1,000 Units) by mouth daily    Osteoporosis       * cholecalciferol 5000 UNITS Caps capsule    vitamin D3    120 capsule    Take 1 capsule (5,000 Units) by mouth daily    Vaginal dryness       CoQ10 200 MG Caps      Take 1 capsule by mouth daily        diclofenac 1 % Gel topical gel    VOLTAREN    100 g    Apply 4 grams to knees or 2 grams to hands four times daily using enclosed dosing card.    Primary osteoarthritis of both wrists       gabapentin 300 MG capsule    NEURONTIN    540 capsule    1-2 tablets EVERY 8 HOURS    Neuropathic pain       HYDROcodone-acetaminophen 5-325 MG per tablet    NORCO    20 tablet    Take 1 tablet by mouth every 6 hours as needed    Chronic left-sided low back pain, with sciatica presence unspecified       ketoconazole 2 % shampoo    NIZORAL    120 mL    Apply topically daily as needed for itching or irritation    Dermatitis, seborrheic       LORazepam 2 MG tablet    ATIVAN    30 tablet    Take 1 tablet at night for sleep    Sleep disorder       metoprolol 25 MG 24 hr tablet    TOPROL-XL    90 tablet    Take 1 tablet (25 mg) by mouth daily    Irregular heart rate       Multi-vitamin Tabs tablet   Generic drug:  multivitamin, therapeutic with minerals      Take 1 tablet by mouth daily.        OMEGA-3 FISH OIL PO      Take 1 capsule by mouth daily        omeprazole 20 MG CR capsule    priLOSEC     Take 20 mg by mouth daily        ondansetron 4 MG ODT tab    ZOFRAN-ODT    90 tablet    1-2 tabs po prn every 6 hours    Nausea       REPHRESH Gel     14 Box    Place 2 g vaginally every 3 days    Urinary tract infection, site not specified       SYNTHROID 112 MCG tablet   Generic drug:  levothyroxine     90 tablet    Take 1/2 tablet daily    Postoperative hypothyroidism       tamoxifen 20 MG tablet    NOLVADEX    90 tablet    Take 1 tablet (20 mg) by mouth daily    Malignant neoplasm of left female breast, unspecified site of breast        terbinafine 1 % cream    lamISIL AT    12 g    Apply topically 2 times daily For fungal infection not resolved with other antifungals (e.g. Clotrimazole)    Tinea pedis of both feet       triamcinolone 0.1 % ointment    KENALOG    30 g    Apply topically 2 times daily    Eczematous dermatitis       triamterene-hydrochlorothiazide 37.5-25 MG per tablet    MAXZIDE-25    90 tablet    Take 1 tablet by mouth daily    Essential hypertension, benign       * Notice:  This list has 2 medication(s) that are the same as other medications prescribed for you. Read the directions carefully, and ask your doctor or other care provider to review them with you.

## 2017-08-01 NOTE — PROGRESS NOTES
HPI: Nadira Perez is a 67 year old female who comes in for a mass she detected on her neck.  She was scratching her neck 7/29/17 when she noted a small tender mass on the right paratracheal area.  She has noted some difficulty swallowing dry food; toast, crackers, etc. but this was on the left side of her throat. She has a cancer hx so is concerned about the mass.    She would also like to get her pneumonia vaccine.    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     Corneal opacity     Hypothyroidism       She has a medical hx of  does not have any pertinent problems on file.    Current Outpatient Prescriptions   Medication Sig Dispense Refill     Coenzyme Q10 (COQ10) 200 MG CAPS Take 1 capsule by mouth daily       omeprazole (PRILOSEC) 20 MG CR capsule Take 20 mg by mouth daily       gabapentin (NEURONTIN) 300 MG capsule 1-2 tablets EVERY 8 HOURS 540 capsule 0     ketoconazole (NIZORAL) 2 % shampoo Apply topically daily as needed for itching or irritation 120 mL 11     terbinafine (LAMISIL AT) 1 % cream Apply topically 2 times daily For fungal infection not resolved with other antifungals (e.g. Clotrimazole) 12 g 1     ondansetron (ZOFRAN-ODT) 4 MG ODT tab 1-2 tabs po prn every 6 hours 90 tablet 2     diclofenac (VOLTAREN) 1 % GEL topical gel Apply 4 grams to knees or 2 grams to hands four times daily using enclosed dosing card. 100 g 1     tamoxifen (NOLVADEX) 20 MG tablet Take 1 tablet (20 mg) by mouth daily  90 tablet 3     Omega-3 Fatty Acids (OMEGA-3 FISH OIL PO) Take 1 capsule by mouth daily        metoprolol (TOPROL-XL) 25 MG 24 hr tablet Take 1 tablet (25 mg) by mouth daily 90 tablet 3     amLODIPine (NORVASC) 10 MG tablet Take 1 tablet (10 mg) by mouth daily AS DIRECTED 90 tablet 3     SYNTHROID 112 MCG tablet Take 1/2 tablet daily 90 tablet 3     atorvastatin (LIPITOR) 40 MG tablet Take 1 tablet (40 mg) by mouth daily Please put on profile- pt just received 90-day supply of cholesterol meds 90 tablet 3     triamterene-hydrochlorothiazide (MAXZIDE-25) 37.5-25 MG per tablet Take 1 tablet by mouth daily 90 tablet 3     HYDROcodone-acetaminophen (NORCO) 5-325 MG per tablet Take 1 tablet by mouth every 6 hours as needed 20 tablet 0     LORazepam (ATIVAN) 2 MG tablet Take 1 tablet at night for sleep 30 tablet 1     cholecalciferol (VITAMIN D) 1000 UNIT tablet Take 1 tablet (1,000 Units) by mouth daily 100 tablet 3     cholecalciferol (VITAMIN D3) 5000 UNITS CAPS capsule Take 1 capsule (5,000 Units) by mouth daily 120 capsule 3     triamcinolone (KENALOG) 0.1 % ointment Apply topically 2 times daily 30 g 1     Vaginal Lubricant (REPHRESH) GEL Place 2 g vaginally every 3 days 14 Box 3     Multiple Vitamin (MULTI-VITAMIN) per tablet Take 1 tablet by mouth daily.         ALLERGIES: Penicillins    PAST SURGICAL HX:   Past Surgical History:   Procedure Laterality Date     ABDOMEN SURGERY      ovarian cyst, mesh     ARTHRODESIS WRIST       ARTHRODESIS WRIST  2/14/2013    Procedure: ARTHRODESIS WRIST;  left wrist scaphoid excision, four bone fusion, iliac crest bone graft  ( Mac with block);  Surgeon: Av Mendez MD;  Location: [facility] OR     BIOPSY      skin, vaginal     BIOPSY OF SKIN LESION       COLONOSCOPY  12/24/2013    Procedure: COMBINED COLONOSCOPY, SINGLE BIOPSY/POLYPECTOMY BY BIOPSY;  COLONOSCOPY;  Surgeon: Dom Alvarez MD;  Location:  GI     COSMETIC SURGERY       ESOPHAGOSCOPY, GASTROSCOPY,  DUODENOSCOPY (EGD), COMBINED N/A 11/23/2016    Procedure: COMBINED ESOPHAGOSCOPY, GASTROSCOPY, DUODENOSCOPY (EGD);  Surgeon: Quinten Feliciano MD;  Location:  GI     EXTERNAL EAR SURGERY      right     EYE SURGERY      radial keratomy     GRAFT BONE FROM ILIAC CREST  2/14/2013    Procedure: GRAFT BONE FROM ILIAC CREST;  mac with block and local infilitration;  Surgeon: Av Mendez MD;  Location: [facility] OR      BREATH HYDROGEN TEST N/A 10/14/2016    Procedure: HYDROGEN BREATH TEST;  Surgeon: Cheri Barron MD;  Location:  GI     HERNIA REPAIR      UMBILICAL     HERNIA REPAIR      umbilical age 18 mos.     HERNIA REPAIR       HERNIORRHAPHY UMBILICAL  1/31/1954     HYSTERECTOMY TOTAL ABDOMINAL  5/3/00     HYSTERECTOMY TOTAL ABDOMINAL  5/3/2000     MASTECTOMY      PROPHYLACTIC RIGHT BREAST     MASTECTOMY MODIFIED RADICAL      LEFT BREAST     MASTECTOMY MODIFIED RADICAL      bilateral; right breast prophylactic     PARATHYROIDECTOMY  9/23/04    R SUPERIOR     PARATHYROIDECTOMY  9/23/2004    parathyroid resection, subtotal     REMOVE HARDWARE HAND  9/24/2013    Procedure: REMOVE HARDWARE HAND;  Left Hand Screw Removal        RHINOPLASTY  12/31/1985     RHINOPLASTY  12/31/1985     thyr proc skin closed cosmetic manner by subcuticular stitch  1/23/2009     TONSILLECTOMY  3/1/68     TONSILLECTOMY  3/1/1968     WRIST SURGERY      wrist arthrodesis       IMMUNIZATION HX:   Immunization History   Administered Date(s) Administered     HepB-Peds 04/26/2005, 06/02/2005, 10/26/2005     Hepatitis A Vac Ped/Adol-2 Dose 04/26/2005, 10/26/2005     Influenza (IIV3) 10/01/2008, 09/29/2009, 10/01/2011, 10/02/2012, 10/09/2013, 10/01/2014     Influenza Intranasal Vaccine 4 valent 10/02/2015     Influenza Vaccine IM 3yrs+ 4 Valent IIV4 10/13/2016     Pneumococcal (PCV 13) 08/01/2017     Pneumococcal 23 valent 10/01/2008     TD (ADULT, 7+) 01/10/2000, 04/26/2005     TDAP Vaccine (Boostrix) 11/06/2012     Typhoid IM  04/26/2005     Zoster vaccine, live 11/14/2012       SOCIAL HX:   Social History     Social History Narrative    .  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       ROS: see HPI      OBJECTIVE:  /80  Pulse 85  Temp 99.3  F (37.4  C) (Oral)  Wt 88.5 kg (195 lb)  BMI 30.32 kg/m2   Wt Readings from Last 1 Encounters:   08/01/17 88.5 kg (195 lb)     Constitutional: no distress, comfortable, pleasant   Eyes: anicteric,conjunctiva pink  Neck: 8x8 mass noted on the right para-trachea.  Tender to palpation. Small right anterior cervical chain LN.  Psychological: appropriate mood   LYMPH: No cervical,  supraclavicular nodes.    ASSESSMENT/PLAN:  Nadira was seen today for throat problem.    Diagnoses and all orders for this visit:    Mass of neck  -     US Head Neck Soft Tissue; Future    Need for vaccination with 13-polyvalent pneumococcal conjugate vaccine  -     PNEUMOCOCCAL CONJ VACCINE 13 VALENT IM    Need for 23-polyvalent pneumococcal polysaccharide vaccine  -     PNEUMOCOCCAL VACCINE,ADULT,SQ OR IM; Future      Total time spent 15 minutes.  More than 50% of the time spent with Ms. Perez on counseling / coordinating her care    Matilde LONG, CNP

## 2017-08-04 ENCOUNTER — TELEPHONE (OUTPATIENT)
Dept: INTERNAL MEDICINE | Facility: CLINIC | Age: 67
End: 2017-08-04

## 2017-08-04 ENCOUNTER — MYC MEDICAL ADVICE (OUTPATIENT)
Dept: INTERNAL MEDICINE | Facility: CLINIC | Age: 67
End: 2017-08-04

## 2017-08-04 DIAGNOSIS — R22.1 LUMP ON NECK: Primary | ICD-10-CM

## 2017-08-04 NOTE — TELEPHONE ENCOUNTER
----- Message from Brina Calvillo sent at 8/4/2017 11:20 AM CDT -----  Regarding: Referral  Contact: [phone]  Please call her Matilde Quiñonez told her to see Dr. Padron and she needs a referral.  She also needs a call back by 1 today

## 2017-08-11 DIAGNOSIS — R22.1 MASS OF NECK: Primary | ICD-10-CM

## 2017-08-14 ENCOUNTER — DOCUMENTATION ONLY (OUTPATIENT)
Dept: INTERVENTIONAL RADIOLOGY/VASCULAR | Facility: CLINIC | Age: 67
End: 2017-08-14

## 2017-08-14 ENCOUNTER — OFFICE VISIT (OUTPATIENT)
Dept: OTOLARYNGOLOGY | Facility: CLINIC | Age: 67
End: 2017-08-14

## 2017-08-14 VITALS — BODY MASS INDEX: 30.61 KG/M2 | WEIGHT: 195 LBS | HEIGHT: 67 IN

## 2017-08-14 DIAGNOSIS — R22.1 MASS OF NECK: Primary | ICD-10-CM

## 2017-08-14 NOTE — PATIENT INSTRUCTIONS
Please follow up in about 6 months with a repeat ultra sound.   Nehemiah Kelly RN  [phone]

## 2017-08-14 NOTE — MR AVS SNAPSHOT
After Visit Summary   8/14/2017    Nadira Perez    MRN: [id number]           Patient Information     Date Of Birth          1950        Visit Information        Provider Department      8/14/2017 3:45 PM Ciera Fisher MD [facility]        Today's Diagnoses     Mass of neck    -  1      Care Instructions    Please follow up in about 6 months with a repeat ultra sound.   Nehemiah Kelly ,RN  [phone]              Follow-ups after your visit        Your next 10 appointments already scheduled     Oct 30, 2017 10:00 AM CDT   (Arrive by 9:45 AM)   Return Visit with Khushbu Louise MD   [facility] ([facility])    [address] [facility] [address] [facility]  [address] [facility] [address] [phone]              Who to contact     Please call your clinic at [phone] to:    Ask questions about your health    Make or cancel appointments    Discuss your medicines    Learn about your test results    Speak to your doctor   If you have compliments or concerns about an experience at your clinic, or if you wish to file a complaint, please contact [facility] Patient Relations at [phone] or email us at [email or web address]         Additional Information About Your Visit        MyChart Information     [employer] gives you secure access to your electronic health record. If you see a primary care provider, you can also send messages to your care team and make appointments. If you have questions, please call your primary care clinic.  If you do not have a primary care provider, please call [phone] and they will assist you.      [employer] is an electronic gateway that provides easy, online access to your medical records. With [employer], you can request a clinic appointment, read your test results, renew a prescription or communicate with your care team.     To access your existing account, please  "contact your [facility] or call [phone] for assistance.        Care EveryWhere ID     This is your Care EveryWhere ID. This could be used by other organizations to access your [facility] medical records  [id number]        Your Vitals Were     Height BMI (Body Mass Index)                1.708 m (5' 7.24\") 30.32 kg/m2           Blood Pressure from Last 3 Encounters:   08/01/17 125/80   06/16/17 128/78   04/17/17 141/88    Weight from Last 3 Encounters:   08/14/17 88.5 kg (195 lb)   08/01/17 88.5 kg (195 lb)   06/16/17 85.3 kg (188 lb)              Today, you had the following     No orders found for display       Primary Care Provider Office Phone # Fax #    Matilde Quiñonez, MERCEDES -[phone]       [address] [place] [address] 741  [facility] [address]        Equal Access to Services     EDEL LAMA : Hadii aad ku hadasho Soomaali, waaxda luqadaha, qaybta kaalmada adeegyada, waxay idiin haywilfredon seven seals . So [facility] [phone].    ATENCIÓN: Si habla español, tiene a davis disposición servicios gratuitos de asistencia lingüística. Llame al [phone].    We comply with applicable federal civil rights laws and [place] laws. We do not discriminate on the basis of race, color, national origin, age, disability sex, sexual orientation or gender identity.            Thank you!     Thank you for choosing [facility]  for your care. Our goal is always to provide you with excellent care. Hearing back from our patients is one way we can continue to improve our services. Please take a few minutes to complete the written survey that you may receive in the mail after your visit with us. Thank you!             Your Updated Medication List - Protect others around you: Learn how to safely use, store and throw away your medicines at www.disposemymeds.org.          This list is accurate as of: 8/14/17 11:59 PM.  Always use your most recent med list.             "       Brand Name Dispense Instructions for use Diagnosis    * cholecalciferol 1000 UNIT tablet    vitamin D    100 tablet    Take 1 tablet (1,000 Units) by mouth daily    Osteoporosis       * cholecalciferol 5000 UNITS Caps capsule    vitamin D3    120 capsule    Take 1 capsule (5,000 Units) by mouth daily    Vaginal dryness       CoQ10 200 MG Caps      Take 1 capsule by mouth daily        diclofenac 1 % Gel topical gel    VOLTAREN    100 g    Apply 4 grams to knees or 2 grams to hands four times daily using enclosed dosing card.    Primary osteoarthritis of both wrists       HYDROcodone-acetaminophen 5-325 MG per tablet    NORCO    20 tablet    Take 1 tablet by mouth every 6 hours as needed    Chronic left-sided low back pain, with sciatica presence unspecified       ketoconazole 2 % shampoo    NIZORAL    120 mL    Apply topically daily as needed for itching or irritation    Dermatitis, seborrheic       LORazepam 2 MG tablet    ATIVAN    30 tablet    Take 1 tablet at night for sleep    Sleep disorder       Multi-vitamin Tabs tablet   Generic drug:  multivitamin, therapeutic with minerals      Take 1 tablet by mouth daily.        OMEGA-3 FISH OIL PO      Take 1 capsule by mouth daily        omeprazole 20 MG CR capsule    priLOSEC     Take 20 mg by mouth daily        ondansetron 4 MG ODT tab    ZOFRAN-ODT    90 tablet    1-2 tabs po prn every 6 hours    Nausea       REPHRESH Gel     14 Box    Place 2 g vaginally every 3 days    Urinary tract infection, site not specified       terbinafine 1 % cream    lamISIL AT    12 g    Apply topically 2 times daily For fungal infection not resolved with other antifungals (e.g. Clotrimazole)    Tinea pedis of both feet       triamcinolone 0.1 % ointment    KENALOG    30 g    Apply topically 2 times daily    Eczematous dermatitis       * Notice:  This list has 2 medication(s) that are the same as other medications prescribed for you. Read the directions carefully, and ask your  doctor or other care provider to review them with you.

## 2017-08-14 NOTE — PROGRESS NOTES
Patient to be placed  on Neuroradiology schedule  for  a US  guided biopsy of her neck nodule, right paratracheal anteriorly and   medially to the right internal jugular vein with peripheral   Vascularization.   - check PTH level on needle wash .    Discussed with Dr. Andres Conn IR RPA  [phone] Call pager  [phone] pager

## 2017-08-14 NOTE — LETTER
8/14/2017       RE: Nadira Perez  [address]  [facility] [address]     Dear Colleague,    Thank you for referring your patient, Nadira Perez, to the [facility] EAR NOSE AND THROAT at [facility]. Please see a copy of my visit note below.    This is a 67-year-old female with pertinent past medical history of a neck exploration, resection of parathyroid adenoma x2 as well as at the second surgery, we performed a right thyroid lobectomy and isthmusectomy.  The patient had subsequent voice weakness with a thyroplasty thereafter in 2010.      The patient actually has been doing quite well.  Her most recent parathyroid hormone level from 2016 is 45, corresponding calcium is 9.8.      The patient felt a small lump just superior and lateral to the trachea on the right side.  This raised concern and led to an ultrasound.  An ultrasound identified a very anterior, slightly lateral to the trachea nodule measuring 8 mm in greatest dimension very well circumscribed.  They did raise some concern for a parathyroid adenoma based on the ultrasound alone.      PHYSICAL EXAMINATION:  Her neck has a well-healed incisional scar.  There is a very small, well-circumscribed nodule just inferior to where the approach to the thyroplasty would have been.  It is nontender.  There are no other masses identified.      I reviewed the ultrasound myself and there again is a very well-circumscribed nodule just between the inferior aspect of the thyroid cartilage and above the cricoid just to the right of midline.      ASSESSMENT:  Anterior neck mass.      PLAN:  This is likely scar tissue or less likely remnant superior pole of the thyroid.  I have no concern about this nodule at this time.  If there is concern from the patient, a biopsy is an option versus clinical followup with a clinical exam versus repeat ultrasound in 6 months.  At this point, the patient would like to just repeat an  ultrasound and follow with clinical exam.  I agree.         NOE CHOWDHURY MD             D: 2017 11:41   T: 2017 13:09   MT: LAITH      Name:     DWAYNE STILES   MRN:      [id number]        Account:      [id number]   :      1950           Service Date: 2017      Document: [id number]

## 2017-08-21 ENCOUNTER — MYC MEDICAL ADVICE (OUTPATIENT)
Dept: INTERNAL MEDICINE | Facility: CLINIC | Age: 67
End: 2017-08-21

## 2017-08-21 DIAGNOSIS — E89.0 POSTOPERATIVE HYPOTHYROIDISM: ICD-10-CM

## 2017-08-21 DIAGNOSIS — I10 ESSENTIAL HYPERTENSION, BENIGN: ICD-10-CM

## 2017-08-21 DIAGNOSIS — I49.9 IRREGULAR HEART RATE: ICD-10-CM

## 2017-08-21 DIAGNOSIS — C50.919 MALIGNANT NEOPLASM OF FEMALE BREAST (H): Primary | ICD-10-CM

## 2017-08-21 DIAGNOSIS — E78.00 PURE HYPERCHOLESTEROLEMIA: ICD-10-CM

## 2017-08-21 DIAGNOSIS — M79.2 NEUROPATHIC PAIN: ICD-10-CM

## 2017-08-21 RX ORDER — TAMOXIFEN CITRATE 20 MG/1
20 TABLET ORAL DAILY
Qty: 90 TABLET | Refills: 0 | Status: SHIPPED | OUTPATIENT
Start: 2017-08-21 | End: 2017-11-01

## 2017-08-22 RX ORDER — ATORVASTATIN CALCIUM 40 MG/1
40 TABLET, FILM COATED ORAL DAILY
Qty: 90 TABLET | Refills: 3 | Status: SHIPPED | OUTPATIENT
Start: 2017-08-22 | End: 2018-10-18

## 2017-08-22 RX ORDER — LEVOTHYROXINE SODIUM 112 MCG
TABLET ORAL
Qty: 90 TABLET | Refills: 3 | Status: SHIPPED | OUTPATIENT
Start: 2017-08-22 | End: 2017-11-21

## 2017-08-22 RX ORDER — AMLODIPINE BESYLATE 10 MG/1
10 TABLET ORAL DAILY
Qty: 90 TABLET | Refills: 3 | Status: SHIPPED | OUTPATIENT
Start: 2017-08-22 | End: 2018-02-15

## 2017-08-22 RX ORDER — GABAPENTIN 300 MG/1
CAPSULE ORAL
Qty: 540 CAPSULE | Refills: 3 | Status: SHIPPED | OUTPATIENT
Start: 2017-08-22 | End: 2018-08-22

## 2017-08-22 RX ORDER — METOPROLOL SUCCINATE 25 MG/1
25 TABLET, EXTENDED RELEASE ORAL DAILY
Qty: 90 TABLET | Refills: 3 | Status: SHIPPED | OUTPATIENT
Start: 2017-08-22 | End: 2018-08-22

## 2017-08-22 NOTE — TELEPHONE ENCOUNTER
Gabapentin      Last Written Prescription Date:  6-16-17  Last Fill Quantity: 540,   # refills: 0        Metoprolol, Amlodipine Levothyroxine, Atorvastatin, Triamterene-HCTZ      Last Written Prescription Date: 10-27-17  Last Fill Quantity: 3 mo, # refills: 3  Last Office Visit : 8-1-17  Next Clinic Visit: none         Potassium   Date Value Ref Range Status   09/15/2016 3.0 (L) 3.4 - 5.3 mmol/L Final     Creatinine   Date Value Ref Range Status   09/15/2016 0.55 0.52 - 1.04 mg/dL Final     BP Readings from Last 3 Encounters:   08/01/17 125/80   06/16/17 128/78   04/17/17 141/88       TSH   Date Value Ref Range Status   10/27/2016 3.09 0.40 - 4.00 mU/L Final   ]  Lab Results   Component Value Date    CHOL 208 10/27/2016     Lab Results   Component Value Date    HDL 56 10/27/2016     Lab Results   Component Value Date     10/27/2016     Lab Results   Component Value Date    TRIG 182 10/27/2016     Lab Results   Component Value Date    CHOLHDLRATIO 3.8 10/30/2015         Kathleen M Doege RN

## 2017-08-23 RX ORDER — TRIAMTERENE/HYDROCHLOROTHIAZID 37.5-25 MG
1 TABLET ORAL DAILY
Qty: 90 TABLET | Refills: 3 | Status: SHIPPED | OUTPATIENT
Start: 2017-08-23 | End: 2018-10-18

## 2017-09-11 NOTE — PROGRESS NOTES
This is a 67-year-old female with pertinent past medical history of a neck exploration, resection of parathyroid adenoma x2 as well as at the second surgery, we performed a right thyroid lobectomy and isthmusectomy.  The patient had subsequent voice weakness with a thyroplasty thereafter in .      The patient actually has been doing quite well.  Her most recent parathyroid hormone level from 2016 is 45, corresponding calcium is 9.8.      The patient felt a small lump just superior and lateral to the trachea on the right side.  This raised concern and led to an ultrasound.  An ultrasound identified a very anterior, slightly lateral to the trachea nodule measuring 8 mm in greatest dimension very well circumscribed.  They did raise some concern for a parathyroid adenoma based on the ultrasound alone.      PHYSICAL EXAMINATION:  Her neck has a well-healed incisional scar.  There is a very small, well-circumscribed nodule just inferior to where the approach to the thyroplasty would have been.  It is nontender.  There are no other masses identified.      I reviewed the ultrasound myself and there again is a very well-circumscribed nodule just between the inferior aspect of the thyroid cartilage and above the cricoid just to the right of midline.      ASSESSMENT:  Anterior neck mass.      PLAN:  This is likely scar tissue or less likely remnant superior pole of the thyroid.  I have no concern about this nodule at this time.  If there is concern from the patient, a biopsy is an option versus clinical followup with a clinical exam versus repeat ultrasound in 6 months.  At this point, the patient would like to just repeat an ultrasound and follow with clinical exam.  I agree.         NOE CHOWDHURY MD             D: 2017 11:41   T: 2017 13:09   MT: LAITH      Name:     DWAYNE STILES   MRN:      [id number]        Account:      [id number]   :      1950           Service Date: 2017       Document: [id number]

## 2017-11-01 ENCOUNTER — MYC MEDICAL ADVICE (OUTPATIENT)
Dept: ONCOLOGY | Facility: CLINIC | Age: 67
End: 2017-11-01

## 2017-11-01 DIAGNOSIS — C50.919 MALIGNANT NEOPLASM OF BREAST IN FEMALE, ESTROGEN RECEPTOR POSITIVE, UNSPECIFIED LATERALITY, UNSPECIFIED SITE OF BREAST (H): ICD-10-CM

## 2017-11-01 DIAGNOSIS — M81.0 OSTEOPOROSIS, UNSPECIFIED OSTEOPOROSIS TYPE, UNSPECIFIED PATHOLOGICAL FRACTURE PRESENCE: Primary | ICD-10-CM

## 2017-11-01 DIAGNOSIS — Z17.0 MALIGNANT NEOPLASM OF BREAST IN FEMALE, ESTROGEN RECEPTOR POSITIVE, UNSPECIFIED LATERALITY, UNSPECIFIED SITE OF BREAST (H): ICD-10-CM

## 2017-11-01 RX ORDER — TAMOXIFEN CITRATE 20 MG/1
20 TABLET ORAL DAILY
Qty: 90 TABLET | Refills: 3 | Status: SHIPPED | OUTPATIENT
Start: 2017-11-01 | End: 2017-11-17

## 2017-11-09 NOTE — TELEPHONE ENCOUNTER
APPT INFO    Date /Time: 11/27/17 at 9:30   Reason for Appt: Hearing loss   Ref Provider/Clinic: Self   Are there internal records? If yes, list: No   Patient Contact (Y/N) & Call Details: No- per  Louis who spoke with patient, no outside records   Action:      OUTSIDE RECORDS CHECKLIST     CLINIC NAME COMMENTS REC (x) IMG (x)   none

## 2017-11-15 ENCOUNTER — ONCOLOGY VISIT (OUTPATIENT)
Dept: ONCOLOGY | Facility: CLINIC | Age: 67
End: 2017-11-15
Attending: PHYSICIAN ASSISTANT
Payer: COMMERCIAL

## 2017-11-15 VITALS
SYSTOLIC BLOOD PRESSURE: 150 MMHG | HEIGHT: 67 IN | TEMPERATURE: 99.6 F | OXYGEN SATURATION: 96 % | BODY MASS INDEX: 33.1 KG/M2 | WEIGHT: 210.9 LBS | DIASTOLIC BLOOD PRESSURE: 86 MMHG | RESPIRATION RATE: 16 BRPM | HEART RATE: 88 BPM

## 2017-11-15 DIAGNOSIS — C50.912 MALIGNANT NEOPLASM OF LEFT BREAST IN FEMALE, ESTROGEN RECEPTOR POSITIVE, UNSPECIFIED SITE OF BREAST (H): Primary | ICD-10-CM

## 2017-11-15 DIAGNOSIS — Z17.0 MALIGNANT NEOPLASM OF LEFT BREAST IN FEMALE, ESTROGEN RECEPTOR POSITIVE, UNSPECIFIED SITE OF BREAST (H): Primary | ICD-10-CM

## 2017-11-15 PROCEDURE — 99212 OFFICE O/P EST SF 10 MIN: CPT | Mod: ZF

## 2017-11-15 PROCEDURE — 99214 OFFICE O/P EST MOD 30 MIN: CPT | Mod: ZP | Performed by: PHYSICIAN ASSISTANT

## 2017-11-15 ASSESSMENT — ENCOUNTER SYMPTOMS
ALTERED TEMPERATURE REGULATION: 0
PALPITATIONS: 0
SINUS CONGESTION: 0
HOARSE VOICE: 0
PARALYSIS: 0
JOINT SWELLING: 1
SINUS PAIN: 0
DIFFICULTY URINATING: 0
JAUNDICE: 0
HOARSE VOICE: 0
POLYDIPSIA: 1
NAIL CHANGES: 1
FLANK PAIN: 0
POLYDIPSIA: 1
HALLUCINATIONS: 0
ORTHOPNEA: 0
JAUNDICE: 0
MUSCLE WEAKNESS: 0
POOR WOUND HEALING: 0
VOMITING: 0
DISTURBANCES IN COORDINATION: 0
NUMBNESS: 1
SWOLLEN GLANDS: 0
TREMORS: 0
DIARRHEA: 1
CHILLS: 0
DYSURIA: 0
DECREASED LIBIDO: 1
HOT FLASHES: 0
HEMATURIA: 0
DYSURIA: 0
RECTAL PAIN: 0
MEMORY LOSS: 0
VOMITING: 0
CHILLS: 0
BRUISES/BLEEDS EASILY: 0
SPEECH CHANGE: 0
HEARTBURN: 1
SWOLLEN GLANDS: 0
SORE THROAT: 0
EXERCISE INTOLERANCE: 0
SORE THROAT: 0
NECK PAIN: 1
LEG PAIN: 1
PALPITATIONS: 0
FEVER: 0
SMELL DISTURBANCE: 0
HEADACHES: 0
HALLUCINATIONS: 0
MUSCLE WEAKNESS: 0
TASTE DISTURBANCE: 0
HOT FLASHES: 0
STIFFNESS: 1
DIARRHEA: 1
LIGHT-HEADEDNESS: 0
BLOOD IN STOOL: 0
NECK MASS: 0
NAIL CHANGES: 1
INCREASED ENERGY: 0
EXERCISE INTOLERANCE: 0
CONSTIPATION: 0
BACK PAIN: 1
DIZZINESS: 1
SKIN CHANGES: 0
MUSCLE CRAMPS: 1
DIFFICULTY URINATING: 0
NECK MASS: 0
BLOATING: 1
MYALGIAS: 1
SPEECH CHANGE: 0
ABDOMINAL PAIN: 1
HYPOTENSION: 0
MYALGIAS: 1
TROUBLE SWALLOWING: 0
POLYPHAGIA: 0
LIGHT-HEADEDNESS: 0
SYNCOPE: 0
SINUS PAIN: 0
LOSS OF CONSCIOUSNESS: 0
HYPERTENSION: 1
BLOOD IN STOOL: 0
TROUBLE SWALLOWING: 0
STIFFNESS: 1
DECREASED APPETITE: 0
INCREASED ENERGY: 0
POLYPHAGIA: 0
NAUSEA: 0
MUSCLE CRAMPS: 1
ABDOMINAL PAIN: 1
ARTHRALGIAS: 1
PARALYSIS: 0
NUMBNESS: 1
NECK PAIN: 1
ALTERED TEMPERATURE REGULATION: 0
SLEEP DISTURBANCES DUE TO BREATHING: 0
MEMORY LOSS: 0
RECTAL PAIN: 0
FEVER: 0
ARTHRALGIAS: 1
DECREASED LIBIDO: 1
BOWEL INCONTINENCE: 0
SEIZURES: 0
DISTURBANCES IN COORDINATION: 0
ORTHOPNEA: 0
WEIGHT LOSS: 0
BRUISES/BLEEDS EASILY: 0
SLEEP DISTURBANCES DUE TO BREATHING: 0
CONSTIPATION: 0
HEADACHES: 0
BACK PAIN: 1
WEAKNESS: 0
BOWEL INCONTINENCE: 0
HEMATURIA: 0
SKIN CHANGES: 0
TINGLING: 1
BLOATING: 1
POOR WOUND HEALING: 0
TREMORS: 0
LOSS OF CONSCIOUSNESS: 0
SYNCOPE: 0
LEG PAIN: 1
NIGHT SWEATS: 0
FATIGUE: 0
NAUSEA: 0
WEIGHT LOSS: 0
HYPOTENSION: 0
JOINT SWELLING: 1
WEIGHT GAIN: 1
SEIZURES: 0
NIGHT SWEATS: 0
SMELL DISTURBANCE: 0
DECREASED APPETITE: 0
HEARTBURN: 1
WEAKNESS: 0
HYPERTENSION: 1
TASTE DISTURBANCE: 0
FATIGUE: 0
SINUS CONGESTION: 0
DIZZINESS: 1
TINGLING: 1
WEIGHT GAIN: 1
FLANK PAIN: 0

## 2017-11-15 ASSESSMENT — PAIN SCALES - GENERAL: PAINLEVEL: MODERATE PAIN (4)

## 2017-11-15 NOTE — MR AVS SNAPSHOT
After Visit Summary   11/15/2017    Nadira Perez    MRN: [id number]           Patient Information     Date Of Birth          1950        Visit Information        Provider Department      11/15/2017 1:00 PM Dee Franco PA [facility]        Today's Diagnoses     Malignant neoplasm of left breast in female, estrogen receptor positive, unspecified site of breast (H)    -  1       Follow-ups after your visit        Your next 10 appointments already scheduled     Nov 21, 2017 10:05 AM CST   (Arrive by 9:50 AM)   PHYSICAL with MERCEDES Rivera CNP   [facility] ([facility] [place])    [address] [place] [id number] [phone]            Nov 27, 2017  8:30 AM CST   Walk In From ENT with Rik Horton   [facility] Audiology ([facility])    [address] [place] [address] [id number] [phone]            Nov 27, 2017  9:30 AM CST   (Arrive by 9:15 AM)   New Patient Visit with Bhavik Romero MD   [facility] Ear Nose and Throat ([facility] [place])    [address] [place] [id number] [phone]            Dec 14, 2017  3:40 PM CST   (Arrive by 3:25 PM)   NEW GENERAL with Lucas Madison DO   [facility] Ophthalmology ([facility])    [address] [place] [address] [id number] [phone]            Jan 25, 2018  9:50 AM CST   (Arrive by 9:35 AM)   RETURN HAND with Lien Prajapati MD   [facility] Orthopaedic Clinic ([facility])    [address] [place] [address] [id number] [phone]              Who to contact     If you have questions or need follow up information about today's clinic visit or your schedule please contact [facility] directly at [phone].  Normal or non-critical lab and  "imaging results will be communicated to you by MyChart, letter or phone within 4 business days after the clinic has received the results. If you do not hear from us within 7 days, please contact the clinic through [employer] or phone. If you have a critical or abnormal lab result, we will notify you by phone as soon as possible.  Submit refill requests through [employer] or call your pharmacy and they will forward the refill request to us. Please allow 3 business days for your refill to be completed.          Additional Information About Your Visit        [employer] Information     [employer] gives you secure access to your electronic health record. If you see a primary care provider, you can also send messages to your care team and make appointments. If you have questions, please call your primary care clinic.  If you do not have a primary care provider, please call [phone] and they will assist you.        Care EveryWhere ID     This is your Care EveryWhere ID. This could be used by other organizations to access your [place] medical records  [id number]        Your Vitals Were     Pulse Temperature Respirations Height Pulse Oximetry BMI (Body Mass Index)    88 99.6  F (37.6  C) (Oral) 16 1.702 m (5' 7\") 96% 33.03 kg/m2       Blood Pressure from Last 3 Encounters:   11/15/17 150/86   08/01/17 125/80   06/16/17 128/78    Weight from Last 3 Encounters:   11/15/17 95.7 kg (210 lb 14.4 oz)   08/14/17 88.5 kg (195 lb)   08/01/17 88.5 kg (195 lb)              Today, you had the following     No orders found for display         Today's Medication Changes          These changes are accurate as of: 11/15/17 11:59 PM.  If you have any questions, ask your nurse or doctor.               Start taking these medicines.        Dose/Directions    anastrozole 1 MG tablet   Commonly known as:  ARIMIDEX   Used for:  Malignant neoplasm of left breast in female, estrogen receptor positive, unspecified site of breast (H)   Started by:  " Dee Franco PA        Dose:  1 mg   Take 1 tablet (1 mg) by mouth daily   Quantity:  90 tablet   Refills:  3            Where to get your medicines      These medications were sent to [facility] [address] [facility] - [place], [place] - [address]  [address], [facility] [address]     Phone:  [phone]     anastrozole 1 MG tablet                Primary Care Provider Office Phone # Fax #    Matilde Quiñonez, APRN [phone]       [address] [place] [address]  [facility] [address]        Equal Access to Services     [facility]: Hadii aad ku hadasho Soomaali, waaxda luqadaha, qaybta kaalmada adeegyada, waxmono plaza hayjuan seals . So [facility] [phone].    ATENCIÓN: Si habla español, tiene a davis disposición servicios gratuitos de asistencia lingüística. [facility] [phone].    We comply with applicable federal civil rights laws and [place] laws. We do not discriminate on the basis of race, color, national origin, age, disability, sex, sexual orientation, or gender identity.            Thank you!     Thank you for choosing [facility]  for your care. Our goal is always to provide you with excellent care. Hearing back from our patients is one way we can continue to improve our services. Please take a few minutes to complete the written survey that you may receive in the mail after your visit with us. Thank you!             Your Updated Medication List - Protect others around you: Learn how to safely use, store and throw away your medicines at www.disposemymeds.org.          This list is accurate as of: 11/15/17 11:59 PM.  Always use your most recent med list.                   Brand Name Dispense Instructions for use Diagnosis    amLODIPine 10 MG tablet    NORVASC    90 tablet    Take 1 tablet (10 mg) by mouth daily AS DIRECTED    Essential hypertension, benign       anastrozole 1 MG tablet    ARIMIDEX    90 tablet    Take 1 tablet (1 mg) by mouth  daily    Malignant neoplasm of left breast in female, estrogen receptor positive, unspecified site of breast (H)       atorvastatin 40 MG tablet    LIPITOR    90 tablet    Take 1 tablet (40 mg) by mouth daily Please put on profile- pt just received 90-day supply of cholesterol meds    Pure hypercholesterolemia       * cholecalciferol 1000 UNIT tablet    vitamin D3    100 tablet    Take 1 tablet (1,000 Units) by mouth daily    Osteoporosis       * cholecalciferol 5000 UNITS Caps capsule    vitamin D3    120 capsule    Take 1 capsule (5,000 Units) by mouth daily    Vaginal dryness       CoQ10 200 MG Caps      Take 1 capsule by mouth daily        diclofenac 1 % Gel topical gel    VOLTAREN    100 g    Apply 4 grams to knees or 2 grams to hands four times daily using enclosed dosing card.    Primary osteoarthritis of both wrists       gabapentin 300 MG capsule    NEURONTIN    540 capsule    1-2 tablets EVERY 8 HOURS    Neuropathic pain       HYDROcodone-acetaminophen 5-325 MG per tablet    NORCO    20 tablet    Take 1 tablet by mouth every 6 hours as needed    Chronic left-sided low back pain, with sciatica presence unspecified       ketoconazole 2 % shampoo    NIZORAL    120 mL    Apply topically daily as needed for itching or irritation    Dermatitis, seborrheic       LORazepam 2 MG tablet    ATIVAN    30 tablet    Take 1 tablet at night for sleep    Sleep disorder       metoprolol 25 MG 24 hr tablet    TOPROL-XL    90 tablet    Take 1 tablet (25 mg) by mouth daily    Irregular heart rate       Multi-vitamin Tabs tablet   Generic drug:  multivitamin, therapeutic with minerals      Take 1 tablet by mouth daily.        OMEGA-3 FISH OIL PO      Take 1 capsule by mouth daily        omeprazole 20 MG CR capsule    priLOSEC     Take 20 mg by mouth daily        ondansetron 4 MG ODT tab    ZOFRAN-ODT    90 tablet    1-2 tabs po prn every 6 hours    Nausea       REPHRESH Gel     14 Box    Place 2 g vaginally every 3 days     Urinary tract infection, site not specified       SYNTHROID 112 MCG tablet   Generic drug:  levothyroxine     90 tablet    Take 1/2 tablet daily    Postoperative hypothyroidism       terbinafine 1 % cream    lamISIL AT    12 g    Apply topically 2 times daily For fungal infection not resolved with other antifungals (e.g. Clotrimazole)    Tinea pedis of both feet       triamcinolone 0.1 % ointment    KENALOG    30 g    Apply topically 2 times daily    Eczematous dermatitis       triamterene-hydrochlorothiazide 37.5-25 MG per tablet    MAXZIDE-25    90 tablet    Take 1 tablet by mouth daily    Essential hypertension, benign       * Notice:  This list has 2 medication(s) that are the same as other medications prescribed for you. Read the directions carefully, and ask your doctor or other care provider to review them with you.

## 2017-11-15 NOTE — LETTER
11/15/2017      RE: Nadira Perez  [address]  [facility] [address]       ONCOLOGY VISIT  Nov 15, 2017    Reason for visit: 6 mos follow up breast cancer    Cancer history: The patient is a 67-year-old woman who in 06/2007 had the onset of left-sided breast discomfort that extended into the axilla. She ultimately did undergo mammogram on 06/13/2007, which identified a mass at the 2:30 position in the left breast. Ultrasound also was notable for some skin and areolar complex thickening. She did undergo biopsy of the mass, and this was consistent with an infiltrating ductal carcinoma that was grade 2. The tumor was ER positive, LA positive, and HER2 negative by FISH. She also had a biopsy of left axillary lymph node versus the tail of the axilla, and this was consistent with metastatic carcinoma. Following the diagnosis the patient did have metastatic staging to include a PET CT scan. This demonstrated increased activity in the left breast as well as the lymph nodes, but was otherwise negative. There was also an MRI of the breast performed, and this demonstrated other lesions of the left breast, but no abnormalities on the right.     She initiated neoadjuvant chemotherapy for her inflammatory breast cancer on 07/13/2007. She received  for 3 cycles through 08/24/2007. This was followed by Taxotere for 3 cycles, which was administered 09/14/2007 through 10/26/2007. Following her chemotherapy she did undergo a left-sided mastectomy with axillary lymph node dissection on 11/20/2007 by Dr. Mauro Mei. The patient did have residual carcinoma with a 1.0 cm tumor as well as associated DCIS. Thirteen of 33 lymph nodes were positive, the largest of which was 0.6 cm of tumor infiltration and included extracapsular extension. The patient also had a right-sided prophylactic mastectomy, which did not demonstrate any noninvasive or invasive carcinoma. Just prior to her surgery Ismael was placed on Arimidex  (start date 11/15/2007). Following her surgery she did have some issues with left chest wall cellulitis as well as some lymphedema of the left upper extremity. She ultimately did go on to receive radiation therapy under the direction of Dr. Nghia Burch. She received radiation to the left chest wall at a dose of 6440 cGy, to the left supraclavicular fossa at a dose of 5040 cGy, and to the left internal mammary chain at a dose of 5080 cGy. Last dose of radiation was administered on 03/07/2008.     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 Perez feels well. 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 She is off her exercise routine and diet routine and gained a bit of weight this summer. She is consistent with pilates twice a week and is working on getting back into walking and weight lifting. She is working on her diet as well. She continues to tolerate tamoxifen well and is anxious for the plan moving forward since she has now been on Tamoxifen for 5 years. Has very minor hot flashes and vaginal dryness both managed medically. Has some diffuse joint aches, worse in back and left wrist from a fall years ago. Nothing is new or different. Her lymphedema is stable as long as she is consistent with home massages. No changes in appetite or energy. No lumps/bumps. No SOB, CP, edema, abdominal pain, changes in bowels/bladder.      ROS: 10 point ROS neg other than the  "symptoms noted above in the HPI.    PHYSICAL EXAMINATION:   /86  Pulse 88  Temp 99.6  F (37.6  C) (Oral)  Resp 16  Ht 1.702 m (5' 7\")  Wt 95.7 kg (210 lb 14.4 oz)  SpO2 96%  BMI 33.03 kg/m2  GENERAL: The patient is alert and oriented in no acute distress.   HEENT: sclera anicteric. Oral mucosa pink and moist   LYMPHATICS: No palpable cervical, supraclavicular, axillary lymphadenopathy bilaterally.   HEART: Regular rate and rhythm without murmur.   LUNGS: Clear to auscultation bilaterally.   BREASTS: She is status post bilateral mastectomies without reconstruction. Multiple chest telengectasias.  There are no dominant masses on palpation of the chest wall, along the mastectomy scars or into the axilla bilaterally.    ABDOMEN: Soft with normal bowel sounds present. No hepatomegaly.   EXTREMITIES: Warm and well perfused with chronic left upper extremity lymphedema. No lower extremity edema.    Assessment/plan:  1. Inflammatory breast cancer, stage IIIC, the left breast, ER positive, HER2 negative, status post neoadjuvant chemotherapy followed by bilateral mastectomies. Chest wall radiotherapy completed in 03/2008. She was on adjuvant Arimidex from November 2007 until November 2012, and then was switched to tamoxifen. She has completed 5 years of this at this point. Given her DEXA has improved over the years, Dr. Louise would like her to resume Arimidex for an additional 5 years per results of MA-17R trial that shows benefits of 10 years of treatment with an AI.  She has no signs of recurrent disease today. Follow-up with Dr. Louise in 6 months.     2. Bone health: History of osteoporosis. DEXA today shows T-score of -1.6. Dr. Louise recommends preventative treatment with bisphosphonate.     3. R[facility]: Through Matilde Quiñonez.     Dee Franco PA-C    [facility] [address] [id number] [phone]                          "

## 2017-11-15 NOTE — NURSING NOTE
"Oncology Rooming Note    November 15, 2017 1:07 PM   Nadira Perez is a 67 year old female who presents for:    Chief Complaint   Patient presents with     Oncology Clinic Visit     Return: Breast Cancer     Initial Vitals: /86  Pulse 88  Temp 99.6  F (37.6  C) (Oral)  Resp 16  Ht 1.702 m (5' 7\")  Wt 95.7 kg (210 lb 14.4 oz)  SpO2 96%  BMI 33.03 kg/m2 Estimated body mass index is 33.03 kg/(m^2) as calculated from the following:    Height as of this encounter: 1.702 m (5' 7\").    Weight as of this encounter: 95.7 kg (210 lb 14.4 oz). Body surface area is 2.13 meters squared.  Moderate Pain (4) Comment: In joints   No LMP recorded. Patient has had a hysterectomy.  Allergies reviewed: Yes  Medications reviewed: Yes    Medications: Medication refills not needed today.  Pharmacy name entered into [employer]:    [facility]  [facility] [address] [place] [address]  [address]    Clinical concerns: Pt. received flu shot at [facility] on 10/06/2017. Pt. Stated that she has pain score 4. I informed pt to remind the Provider/TOBI about  pain level in case i dont touch bases with them, if the provider was in the exam room while i attend on rooming the next pt. Pt verbalized understandings.  ROSSY Yanez    5 minutes for nursing intake (face to face time)     ROSSY Yanez      "

## 2017-11-17 RX ORDER — ANASTROZOLE 1 MG/1
1 TABLET ORAL DAILY
Qty: 90 TABLET | Refills: 3 | Status: SHIPPED | OUTPATIENT
Start: 2017-11-17 | End: 2018-11-20

## 2017-11-19 ASSESSMENT — ACTIVITIES OF DAILY LIVING (ADL)
IN_THE_PAST_7_DAYS,_DID_YOU_NEED_HELP_FROM_OTHERS_TO_PERFORM_EVERYDAY_ACTIVITIES_SUCH_AS_EATING,_GETTING_DRESSED,_GROOMING,_BATHING,_WALKING,_OR_USING_THE_TOILET: N
IN_THE_PAST_7_DAYS,_DID_YOU_NEED_HELP_FROM_OTHERS_TO_TAKE_CARE_OF_THINGS_SUCH_AS_LAUNDRY_AND_HOUSEKEEPING,_BANKING,_SHOPPING,_USING_THE_TELEPHONE,_FOOD_PREPARATION,_TRANSPORTATION,_OR_TAKING_YOUR_OWN_MEDICATIONS?: N

## 2017-11-21 ENCOUNTER — OFFICE VISIT (OUTPATIENT)
Dept: INTERNAL MEDICINE | Facility: CLINIC | Age: 67
End: 2017-11-21

## 2017-11-21 VITALS
RESPIRATION RATE: 16 BRPM | WEIGHT: 210 LBS | HEIGHT: 67 IN | DIASTOLIC BLOOD PRESSURE: 81 MMHG | BODY MASS INDEX: 32.96 KG/M2 | SYSTOLIC BLOOD PRESSURE: 134 MMHG | OXYGEN SATURATION: 96 % | HEART RATE: 82 BPM

## 2017-11-21 DIAGNOSIS — G89.29 CHRONIC LEFT-SIDED LOW BACK PAIN, WITH SCIATICA PRESENCE UNSPECIFIED: ICD-10-CM

## 2017-11-21 DIAGNOSIS — E78.00 PURE HYPERCHOLESTEROLEMIA: ICD-10-CM

## 2017-11-21 DIAGNOSIS — C50.912 MALIGNANT NEOPLASM OF LEFT BREAST IN FEMALE, ESTROGEN RECEPTOR POSITIVE, UNSPECIFIED SITE OF BREAST (H): Primary | ICD-10-CM

## 2017-11-21 DIAGNOSIS — E03.9 HYPOTHYROIDISM, UNSPECIFIED TYPE: Primary | ICD-10-CM

## 2017-11-21 DIAGNOSIS — I10 ESSENTIAL HYPERTENSION, BENIGN: ICD-10-CM

## 2017-11-21 DIAGNOSIS — M85.80 OSTEOPENIA, UNSPECIFIED LOCATION: ICD-10-CM

## 2017-11-21 DIAGNOSIS — E03.9 HYPOTHYROIDISM, UNSPECIFIED TYPE: ICD-10-CM

## 2017-11-21 DIAGNOSIS — Z79.811 AROMATASE INHIBITOR USE: ICD-10-CM

## 2017-11-21 DIAGNOSIS — Z17.0 MALIGNANT NEOPLASM OF LEFT BREAST IN FEMALE, ESTROGEN RECEPTOR POSITIVE, UNSPECIFIED SITE OF BREAST (H): Primary | ICD-10-CM

## 2017-11-21 DIAGNOSIS — M54.5 CHRONIC LEFT-SIDED LOW BACK PAIN, WITH SCIATICA PRESENCE UNSPECIFIED: ICD-10-CM

## 2017-11-21 DIAGNOSIS — R73.09 ELEVATED GLUCOSE: ICD-10-CM

## 2017-11-21 DIAGNOSIS — E87.6 HYPOPOTASSEMIA: ICD-10-CM

## 2017-11-21 DIAGNOSIS — Z13.1 SCREENING FOR DIABETES MELLITUS: ICD-10-CM

## 2017-11-21 LAB
ALBUMIN SERPL-MCNC: 4 G/DL (ref 3.4–5)
ALBUMIN UR-MCNC: NEGATIVE MG/DL
ALP SERPL-CCNC: 68 U/L (ref 40–150)
ALT SERPL W P-5'-P-CCNC: 38 U/L (ref 0–50)
ANION GAP SERPL CALCULATED.3IONS-SCNC: 9 MMOL/L (ref 3–14)
APPEARANCE UR: CLEAR
AST SERPL W P-5'-P-CCNC: 27 U/L (ref 0–45)
BILIRUB SERPL-MCNC: 0.3 MG/DL (ref 0.2–1.3)
BILIRUB UR QL STRIP: NEGATIVE
BUN SERPL-MCNC: 11 MG/DL (ref 7–30)
CALCIUM SERPL-MCNC: 8.8 MG/DL (ref 8.5–10.1)
CHLORIDE SERPL-SCNC: 104 MMOL/L (ref 94–109)
CHOLEST SERPL-MCNC: 206 MG/DL
CO2 SERPL-SCNC: 29 MMOL/L (ref 20–32)
COLOR UR AUTO: ABNORMAL
CREAT SERPL-MCNC: 0.52 MG/DL (ref 0.52–1.04)
GFR SERPL CREATININE-BSD FRML MDRD: >90 ML/MIN/1.7M2
GLUCOSE SERPL-MCNC: 94 MG/DL (ref 70–99)
GLUCOSE UR STRIP-MCNC: NEGATIVE MG/DL
HBA1C MFR BLD: 5.9 % (ref 4.3–6)
HDLC SERPL-MCNC: 67 MG/DL
HGB UR QL STRIP: NEGATIVE
KETONES UR STRIP-MCNC: NEGATIVE MG/DL
LDLC SERPL CALC-MCNC: 85 MG/DL
LEUKOCYTE ESTERASE UR QL STRIP: NEGATIVE
MUCOUS THREADS #/AREA URNS LPF: PRESENT /LPF
NITRATE UR QL: NEGATIVE
NONHDLC SERPL-MCNC: 138 MG/DL
PH UR STRIP: 8 PH (ref 5–7)
PHOSPHATE SERPL-MCNC: 3 MG/DL (ref 2.5–4.5)
POTASSIUM SERPL-SCNC: 3.8 MMOL/L (ref 3.4–5.3)
PROT SERPL-MCNC: 7.3 G/DL (ref 6.8–8.8)
RBC #/AREA URNS AUTO: 1 /HPF (ref 0–2)
SODIUM SERPL-SCNC: 141 MMOL/L (ref 133–144)
SOURCE: ABNORMAL
SP GR UR STRIP: 1.01 (ref 1–1.03)
SQUAMOUS #/AREA URNS AUTO: <1 /HPF (ref 0–1)
TRIGL SERPL-MCNC: 268 MG/DL
TSH SERPL DL<=0.005 MIU/L-ACNC: 4.8 MU/L (ref 0.4–4)
UROBILINOGEN UR STRIP-MCNC: 0 MG/DL (ref 0–2)
WBC #/AREA URNS AUTO: <1 /HPF (ref 0–2)

## 2017-11-21 RX ORDER — HYDROCODONE BITARTRATE AND ACETAMINOPHEN 5; 325 MG/1; MG/1
1 TABLET ORAL EVERY 6 HOURS PRN
Qty: 40 TABLET | Refills: 0 | Status: SHIPPED | OUTPATIENT
Start: 2017-11-21 | End: 2018-10-18

## 2017-11-21 RX ORDER — ZOLEDRONIC ACID 0.04 MG/ML
4 INJECTION, SOLUTION INTRAVENOUS ONCE
Status: CANCELLED | OUTPATIENT
Start: 2017-11-29 | End: 2017-11-29

## 2017-11-21 RX ORDER — LEVOTHYROXINE SODIUM 112 UG/1
112 TABLET ORAL DAILY
Qty: 45 TABLET | Refills: 3 | Status: SHIPPED | OUTPATIENT
Start: 2017-11-21 | End: 2017-11-22

## 2017-11-21 ASSESSMENT — PAIN SCALES - GENERAL: PAINLEVEL: MODERATE PAIN (4)

## 2017-11-21 NOTE — PROGRESS NOTES
"HPI:  Nadira Perez is a 66 year old female who presents to clinic today for physical.  Her Hgb A1C was borderline 10/30/2015.  She would like it checked again.     She has an eye exam scheduled here for future. She was told she has cataracts.    She is asking for a refill of Vicodin because she doesn't like to use \"too much\" tylenol.     She noticed edema in her lower legs today because she ate potato chips yesterday.     She plans to meet with a pharmacist to discuss whether to continue on alendronate.    Her right first toenail is falling off.  It was thickened by onychomycosis. She was using Vicks on it.      She has the following medical issues :   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     Corneal opacity     Hypothyroidism     Osteopenia, unspecified location     Aromatase inhibitor use       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     Corneal opacity     Hypothyroidism     Osteopenia, unspecified " location     Aromatase inhibitor use       MEDICATIONS:  Current Outpatient Prescriptions   Medication Sig Dispense Refill     triamterene-hydrochlorothiazide (MAXZIDE-25) 37.5-25 MG per tablet Take 1 tablet by mouth daily 90 tablet 3     amLODIPine (NORVASC) 10 MG tablet Take 1 tablet (10 mg) by mouth daily AS DIRECTED 90 tablet 3     atorvastatin (LIPITOR) 40 MG tablet Take 1 tablet (40 mg) by mouth daily Please put on profile- pt just received 90-day supply of cholesterol meds 90 tablet 3     gabapentin (NEURONTIN) 300 MG capsule 1-2 tablets EVERY 8 HOURS 540 capsule 3     metoprolol (TOPROL-XL) 25 MG 24 hr tablet Take 1 tablet (25 mg) by mouth daily 90 tablet 3     SYNTHROID 112 MCG tablet Take 1/2 tablet daily 90 tablet 3     Coenzyme Q10 (COQ10) 200 MG CAPS Take 1 capsule by mouth daily       omeprazole (PRILOSEC) 20 MG CR capsule Take 20 mg by mouth daily       ketoconazole (NIZORAL) 2 % shampoo Apply topically daily as needed for itching or irritation 120 mL 11     terbinafine (LAMISIL AT) 1 % cream Apply topically 2 times daily For fungal infection not resolved with other antifungals (e.g. Clotrimazole) 12 g 1     ondansetron (ZOFRAN-ODT) 4 MG ODT tab 1-2 tabs po prn every 6 hours 90 tablet 2     diclofenac (VOLTAREN) 1 % GEL topical gel Apply 4 grams to knees or 2 grams to hands four times daily using enclosed dosing card. 100 g 1     Omega-3 Fatty Acids (OMEGA-3 FISH OIL PO) Take 1 capsule by mouth daily        HYDROcodone-acetaminophen (NORCO) 5-325 MG per tablet Take 1 tablet by mouth every 6 hours as needed 20 tablet 0     LORazepam (ATIVAN) 2 MG tablet Take 1 tablet at night for sleep 30 tablet 1     cholecalciferol (VITAMIN D) 1000 UNIT tablet Take 1 tablet (1,000 Units) by mouth daily 100 tablet 3     cholecalciferol (VITAMIN D3) 5000 UNITS CAPS capsule Take 1 capsule (5,000 Units) by mouth daily 120 capsule 3     triamcinolone (KENALOG) 0.1 % ointment Apply topically 2 times daily 30 g 1      Vaginal Lubricant (REPHRESH) GEL Place 2 g vaginally every 3 days 14 Box 3     Multiple Vitamin (MULTI-VITAMIN) per tablet Take 1 tablet by mouth daily.       anastrozole (ARIMIDEX) 1 MG tablet Take 1 tablet (1 mg) by mouth daily (Patient not taking: Reported on 11/21/2017) 90 tablet 3       ALLERGIES: Penicillins    PAST MEDICAL HX:   Past Medical History:   Diagnosis Date     Arthritis      Bone disease      Breast cancer (H)      H/O kyphoplasty      Hearing problem      History of kidney stones      History of radiation therapy      Hyperlipemia      Hypertension      Hypopotassemia      Kidney problem      Lymph edema      Medullary sponge kidney      Osteopenia      PONV (postoperative nausea and vomiting)      Reduced vision      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     Thyroid disease        PAST SURGICAL HX:   Past Surgical History:   Procedure Laterality Date     ABDOMEN SURGERY      ovarian cyst, mesh     ARTHRODESIS WRIST       ARTHRODESIS WRIST  2/14/2013    Procedure: ARTHRODESIS WRIST;  left wrist scaphoid excision, four bone fusion, iliac crest bone graft  ( Mac with block);  Surgeon: Av Mendez MD;  [facility]: [facility]     BIOPSY      skin, vaginal     BIOPSY OF SKIN LESION       COLONOSCOPY  12/24/2013    Procedure: COMBINED COLONOSCOPY, SINGLE BIOPSY/POLYPECTOMY BY BIOPSY;  COLONOSCOPY;  Surgeon: Dom Alvarez MD;  Location:  GI     COSMETIC SURGERY       ESOPHAGOSCOPY, GASTROSCOPY, DUODENOSCOPY (EGD), COMBINED N/A 11/23/2016    Procedure: COMBINED ESOPHAGOSCOPY, GASTROSCOPY, DUODENOSCOPY (EGD);  Surgeon: Quinten Feliciano MD;  Location:  GI     EXTERNAL EAR SURGERY      right     EYE SURGERY      radial keratomy     GRAFT BONE FROM ILIAC CREST  2/14/2013    Procedure: GRAFT BONE FROM ILIAC CREST;  mac with block and local infilitration;  Surgeon: Av Mendez MD;  Location:  OR      BREATH HYDROGEN TEST N/A 10/14/2016    Procedure: HYDROGEN BREATH  TEST;  Surgeon: Cheri Barron MD;  Location:  GI     HERNIA REPAIR      UMBILICAL     HERNIA REPAIR      umbilical age 18 mos.     HERNIA REPAIR       HERNIORRHAPHY UMBILICAL  1/31/1954     HYSTERECTOMY TOTAL ABDOMINAL  5/3/00     HYSTERECTOMY TOTAL ABDOMINAL  5/3/2000     MASTECTOMY      PROPHYLACTIC RIGHT BREAST     MASTECTOMY MODIFIED RADICAL      LEFT BREAST     MASTECTOMY MODIFIED RADICAL      bilateral; right breast prophylactic     PARATHYROIDECTOMY  9/23/04    R SUPERIOR     PARATHYROIDECTOMY  9/23/2004    parathyroid resection, subtotal     REMOVE HARDWARE HAND  9/24/2013    Procedure: REMOVE HARDWARE HAND;  Left Hand Screw Removal        RHINOPLASTY  12/31/1985     RHINOPLASTY  12/31/1985     thyr proc skin closed cosmetic manner by subcuticular stitch  1/23/2009     TONSILLECTOMY  3/1/68     TONSILLECTOMY  3/1/1968     WRIST SURGERY      wrist arthrodesis       FAMILY MEDICAL HX:   Family History   Problem Relation Age of Onset     HEART DISEASE Father      AAA     Hypertension Father      Neurologic Disorder Mother      Anuerysm of Cerebral Artery, Dementia     DIABETES Mother      Thyroid Disease Mother      ,     CEREBROVASCULAR DISEASE Mother      Dementia Mother      OSTEOPOROSIS Mother      DIABETES Maternal Grandmother      Asthma Maternal Grandmother      DIABETES Maternal Aunt      x2     Melanoma Maternal Aunt      Circulatory Brother      Perihperal Neurophathy     Dementia Other      CANCER Other      malignant melanoma     Hypertension Other      Hypertension Other      CEREBROVASCULAR DISEASE Other      CEREBROVASCULAR DISEASE Other      Obesity Other      Respiratory Other      Chronic Obstructive Pulmonary Disease Maternal Grandfather      father     Asthma Maternal Grandfather      Breast Cancer Cousin      CANCER Other      DIABETES Other      Asthma Other      Glaucoma No family hx of      Macular Degeneration No family hx of        IMMUNIZATION HX:   Immunization  History   Administered Date(s) Administered     HEPA 04/26/2005, 10/26/2005     HepB 04/26/2005, 06/02/2005, 10/26/2005     Influenza (High Dose) 3 valent vaccine 10/06/2017     Influenza (IIV3) PF 10/01/2008, 09/29/2009, 10/01/2011, 10/02/2012, 10/09/2013, 10/01/2014     Influenza Intranasal Vaccine 4 valent 10/02/2015     Influenza Vaccine IM 3yrs+ 4 Valent IIV4 10/13/2016     Pneumococcal (PCV 13) 08/01/2017     Pneumococcal 23 valent 10/01/2008     TD (ADULT, 7+) 01/10/2000, 04/26/2005     TDAP Vaccine (Boostrix) 11/06/2012     Typhoid IM 04/26/2005     Zoster vaccine, live 11/14/2012       SOCIAL HX:   Social History     Social History     Marital Status:      Spouse Name: N/A     Number of Children: N/A     Years of Education: N/A     Social History Main Topics     Smoking status: Never Smoker      Smokeless tobacco: Never Used     Alcohol Use: No      Comment: 2 per month     Drug Use: No     Sexual Activity:     Partners: Male     Birth Control/ Protection: None     Other Topics Concern      Service No     Caffeine Concern No     Occupational Exposure No     Hobby Hazards No     Sleep Concern No     Stress Concern No     Weight Concern No     Special Diet No     Back Care No     Exercise Yes     walks 4-6x  week for 20-30 min. each     Seat Belt Yes     Self-Exams Yes     Parent/Sibling W/ Cabg, Mi Or Angioplasty Before 65f 55m? No     Social History Narrative    .  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       ROS:  CONSTITUTIONAL: no fatigue, no unexpected change in weight  SKIN: no worrisome rashes, no worrisome moles, no worrisome lesions.  See HPI regarding toenail.   EYES: no acute vision problems or changes.  Glasses.  Exams UTD. See HPI.  ENT: no ear problems, no mouth problems, no throat problems.  Dental UTD.   RESP: no significant cough, no shortness of breath  BREAST: surgically absent.  She is UTD with Dr. Louise.  She is  considering extending her chemoprophylaxis another 5 years.  CV: no chest pain, no palpitations.  GI: Her abdominal pain persists occasionally despite using the Omeprazole. Mild intermittent  nausea, no vomiting, no constipation, no diarrhea  : no frequency, no dysuria, no hematuria.  She had an episode of dark urine but increased her water intake.   MS: no claudication, no myalgias, no joint aches  NEURO: no weakness, no dizziness, no syncope, no headaches  ENDOCRINE: no temperature intolerance, no skin/hair changes.    HEME: no easy bruising, no bleeding problems  PSYCHIATRIC: NEGATIVE for changes in mood or affect    Answers for HPI/ROS submitted by the patient on 11/15/2017   General Symptoms: Yes  Skin Symptoms: Yes  HENT Symptoms: Yes  EYE SYMPTOMS: No  HEART SYMPTOMS: Yes  LUNG SYMPTOMS: No  INTESTINAL SYMPTOMS: Yes  URINARY SYMPTOMS: Yes  GYNECOLOGIC SYMPTOMS: Yes  BREAST SYMPTOMS: No  SKELETAL SYMPTOMS: Yes  BLOOD SYMPTOMS: Yes  NERVOUS SYSTEM SYMPTOMS: Yes  MENTAL HEALTH SYMPTOMS: No  Fever: No  Loss of appetite: No  Weight loss: No  Weight gain: Yes  Fatigue: No  Night sweats: No  Chills: No  Increased stress: No  Excessive hunger: No  Excessive thirst: Yes  Feeling hot or cold when others believe the temperature is normal: No  Loss of height: No  Post-operative complications: No  Surgical site pain: No  Hallucinations: No  Change in or Loss of Energy: No  Hyperactivity: No  Confusion: No  Changes in hair: Yes  Changes in moles/birth marks: No  Itching: No  Rashes: No  Changes in nails: Yes  Acne: No  Hair in places you don't want it: No  Change in facial hair: No  Warts: No  Non-healing sores: No  Scarring: No  Flaking of skin: No  Color changes of hands/feet in cold : No  Sun sensitivity: No  Skin thickening: No  Ear pain: No  Ear discharge: No  Hearing loss: Yes  Tinnitus: Yes  Nosebleeds: No  Congestion: No  Sinus pain: No  Trouble swallowing: No   Voice hoarseness: No  Mouth sores: No  Sore throat:  No  Tooth pain: No  Gum tenderness: No  Bleeding gums: Yes  Change in taste: No  Change in sense of smell: No  Dry mouth: Yes  Hearing aid used: No  Neck lump: No  Chest pain or pressure: No  Fast or irregular heartbeat: No  Pain in legs with walking: Yes  Trouble breathing while lying down: No  Fingers or toes appear blue: No  High blood pressure: Yes  Low blood pressure: No  Fainting: No  Murmurs: No  Pacemaker: No  Varicose veins: No  Edema or swelling: Yes  Wake up at night with shortness of breath: No  Light-headedness: No  Exercise intolerance: No  Heart burn or indigestion: Yes  Nausea: No  Vomiting: No  Abdominal pain: Yes  Bloating: Yes  Constipation: No  Diarrhea: Yes  Blood in stool: No  Black stools: No  Rectal or Anal pain: No  Fecal incontinence: No  Yellowing of skin or eyes: No  Vomit with blood: No  Change in stools: No  Trouble holding urine or incontinence: Yes  Pain or burning: No  Trouble starting or stopping: No  Increased frequency of urination: No  Blood in urine: No  Decreased frequency of urination: No  Frequent nighttime urination: No  Flank pain: No  Difficulty emptying bladder: No  Back pain: Yes  Muscle aches: Yes  Neck pain: Yes  Swollen joints: Yes  Joint pain: Yes  Bone pain: Yes  Muscle cramps: Yes  Muscle weakness: No  Joint stiffness: Yes  Bone fracture: No  Anemia: No  Swollen glands: No  Easy bleeding or bruising: No  Trouble with coordination: No  Dizziness or trouble with balance: Yes  Fainting or black-out spells: No  Memory loss: No  Headache: No  Seizures: No  Speech problems: No  Tingling: Yes  Tremor: No  Weakness: No  Difficulty walking: Yes  Paralysis: No  Numbness: Yes  Bleeding or spotting between periods: No  Heavy or painful periods: No  Irregular periods: No  Vaginal discharge: No  Hot flashes: No  Vaginal dryness: Yes  Genital ulcers: No  Reduced libido: Yes  Painful intercourse: Yes  Difficulty with sexual arousal: Yes  Post-menopausal bleeding:  "No    OBJECTIVE:  /81 (BP Location: Right arm, Patient Position: Sitting, Cuff Size: Adult Large)  Pulse 82  Resp 16  Ht 1.708 m (5' 7.25\")  Wt 95.3 kg (210 lb)  SpO2 96%  Breastfeeding? No  BMI 32.65 kg/m2   Wt Readings from Last 1 Encounters:   11/21/17 95.3 kg (210 lb)     Constitutional: no distress, comfortable, pleasant   Eyes: anicteric, conjunctiva pink, normal extra-ocular movements.  Glasses.  PERRLA.  Ears, Nose and Throat: tympanic membranes clear,  throat clear.   Neck: supple with full range of motion, no thyromegaly. She has operative surgical scars from parathyroid surgery.   Cardiovascular: regular rate and rhythm, normal S1 and S2, no murmurs, rubs or gallops, peripheral pulses full and symmetric.  No edema.     Respiratory: clear to auscultation, no wheezes or crackles, normal breath sounds   Gastrointestinal: positive bowel sounds, nontender, no hepatosplenomegaly, no masses   Breast Exam:  Breast: bilaterally surgically absent. Axillary nodes negative.    Pelvic: nl external exam;no vaginal tissue findings.   EG/BUS: Normal genital architecture without lesions, erythema or abnormal secretions. Normal genital architecture without lesions, erythema or abnormal secretions Bartholin's, Urethra, [place]'s normal    Urethral meatus: normal    Urethra: no masses, tenderness, or scarring    Bladder: no masses or tenderness    Vagina: moist, pink, rugae.   Cervix: no lesions and pink, moist, closed, without lesion or CMT  Uterus: mid position, and exam findings compromised by body habitus  Adnexa: Within normal limits, No masses, nodularity, tenderness and not palpable secondary to body habitus  Musculoskeletal: full range of motion, no edema   Skin: no concerning lesions, no jaundice, temp normal   Neurological:  normal gait, normal speech, no tremor   Psychological: appropriate mood   LYMPH: no axillary, cervical,  supraclavicular, infraclavicular or inguinal nodes     "   ASSESSMENT/PLAN:  There are no diagnoses linked to this encounter.  Nadira was seen today for physical.    Diagnoses and all orders for this visit:    Hypothyroidism, unspecified type  -     Discontinue: levothyroxine (SYNTHROID/LEVOTHROID) 112 MCG tablet; Take 1 tablet (112 mcg) by mouth daily Take 0.5 tab month  -     Phosphorus; Future  -     TSH; Future  -     levothyroxine (SYNTHROID/LEVOTHROID) 112 MCG tablet; Take 0.5 tab daily    Chronic left-sided low back pain, with sciatica presence unspecified  -     HYDROcodone-acetaminophen (NORCO) 5-325 MG per tablet; Take 1 tablet by mouth every 6 hours as needed    Pure hypercholesterolemia  -     Lipid panel reflex to direct LDL Fasting; Future    Hypopotassemia    Essential hypertension, benign  -     Comprehensive metabolic panel; Future  -     UA with Micro reflex to Culture; Future    Screening for diabetes mellitus    Elevated glucose  -     Hemoglobin A1c; Future      Total time spent  40 minutes answering her several questions and addressing issues.   More than 50% of the time spent with Ms. Perez on counseling / coordinating her care    Matilde LONG, CNP

## 2017-11-21 NOTE — NURSING NOTE
Chief Complaint   Patient presents with     Physical     Patient is here for annual physical     Lyric Fofana CMA 10:19 AM on 11/21/2017.

## 2017-11-21 NOTE — MR AVS SNAPSHOT
After Visit Summary   11/21/2017    Nadira Perez    MRN: [id number]           Patient Information     Date Of Birth          1950        Visit Information        Provider Department      11/21/2017 10:05 AM Matilde Quiñonez, APRN CNP [facility]        Today's Diagnoses     Hypothyroidism, unspecified type    -  1    Chronic left-sided low back pain, with sciatica presence unspecified        Pure hypercholesterolemia        Hypopotassemia        Essential hypertension, benign        Screening for diabetes mellitus        Elevated glucose          Care Instructions    Primary Care Center: [phone]     Primary Care Center Medication Refill Request Information:  * Please contact your pharmacy regarding ANY request for medication refills.  ** [facility] Prescription Fax = [phone]  * Please allow 3 business days for routine medication refills.  * Please allow 5 business days for controlled substance medication refills.     Primary Care Center Test Result notification information:  *You will be notified with in 7-10 days of your appointment day regarding the results of your test.  If you are on MyChart you will be notified as soon as the provider has reviewed the results and signed off on them.            Follow-ups after your visit        Your next 10 appointments already scheduled     Nov 21, 2017 12:00 PM CST   LAB with [facility] ([facility])    [address] [place] [address] [phone]           Please do not eat 10-12 hours before your appointment if you are coming in fasting for labs on lipids, cholesterol, or glucose (sugar). This does not apply to pregnant women. Water, hot tea and black coffee (with nothing added) are okay. Do not drink other fluids, diet soda or chew gum.            Nov 27, 2017  8:30 AM CST   Walk In From ENT with Rik Horton [facility] ([facility])    [address] [place] [address]   [phone]            Nov 27, 2017  9:30 AM CST   (Arrive by 9:15 AM)   New Patient Visit with Bhavik Romero MD   [facility] Ear Nose and Throat ([facility])    [address] [place] [address]   [phone]            Nov 29, 2017  9:30 AM CST   (Arrive by 9:15 AM)   SHORT with Bee Hudson [facility] Medication Therapy Management ([facility])    [address] [place] [address] [phone]            Dec 14, 2017  3:40 PM CST   (Arrive by 3:25 PM)   NEW GENERAL with Lucas Madison DO   [facility] Ophthalmology ([facility])    [address] [place] [address]   [phone]            Jan 25, 2018  9:50 AM CST   (Arrive by 9:35 AM)   RETURN HAND with Lien Prajapati MD   [facility] Orthopaedic Clinic ([facility])    [address] [place] [address] [phone]              Future tests that were ordered for you today     Open Future Orders        Priority Expected Expires Ordered    Comprehensive metabolic panel Routine 11/21/2017 12/5/2017 11/21/2017    TSH Routine  11/21/2018 11/21/2017    Hemoglobin A1c Routine  11/21/2018 11/21/2017    UA with Micro reflex to Culture Routine 11/21/2017 11/21/2018 11/21/2017    Lipid panel reflex to direct LDL Fasting Routine  11/21/2018 11/21/2017    Phosphorus Routine 11/21/2017 12/5/2017 11/21/2017            Who to contact     Please call your clinic at [phone] to:    Ask questions about your health    Make or cancel appointments    Discuss your medicines    Learn about your test results    Speak to your doctor   If you have compliments or concerns about an experience at your clinic, or if you wish to file a complaint, please contact [facility]  "Physicians Patient Relations at [phone] or email us at [email or web address]         Additional Information About Your Visit        COLOURlovershart Information     Network [employer] gives you secure access to your electronic health record. If you see a primary care provider, you can also send messages to your care team and make appointments. If you have questions, please call your primary care clinic.  If you do not have a primary care provider, please call [phone] and they will assist you.      [employer] is an electronic gateway that provides easy, online access to your medical records. With [employer], you can request a clinic appointment, read your test results, renew a prescription or communicate with your care team.     To access your existing account, please contact your [facility] or call [phone] for assistance.        Care EveryWhere ID     This is your Care EveryWhere ID. This could be used by other organizations to access your [place] medical records  [id number]        Your Vitals Were     Pulse Respirations Height Pulse Oximetry Breastfeeding? BMI (Body Mass Index)    82 16 1.708 m (5' 7.25\") 96% No 32.65 kg/m2       Blood Pressure from Last 3 Encounters:   11/21/17 134/81   11/15/17 150/86   08/01/17 125/80    Weight from Last 3 Encounters:   11/21/17 95.3 kg (210 lb)   11/15/17 95.7 kg (210 lb 14.4 oz)   08/14/17 88.5 kg (195 lb)                 Today's Medication Changes          These changes are accurate as of: 11/21/17 11:23 AM.  If you have any questions, ask your nurse or doctor.               Start taking these medicines.        Dose/Directions    levothyroxine 112 MCG tablet   Commonly known as:  SYNTHROID/LEVOTHROID   Used for:  Hypothyroidism, unspecified type   Started by:  Matilde Quiñonez APRN CNP        Dose:  112 mcg   Take 1 tablet (112 mcg) by mouth daily Take 0.5 tab month   Quantity:  45 tablet   Refills:  3            Where to get your " medicines      These medications were sent to [facility] - [place], [place] - [address]  [id number], [facility] [address]     Phone:  [phone]     levothyroxine 112 MCG tablet         Some of these will need a paper prescription and others can be bought over the counter.  Ask your nurse if you have questions.     Bring a paper prescription for each of these medications     HYDROcodone-acetaminophen 5-325 MG per tablet                Primary Care Provider Office Phone # Fax #    Matilde Quiñonez, APRN [phone]       [address] [place] [address]  [facility] [address]        Equal Access to Services     Frank R. Howard Memorial HospitalNIHARIKA : Hadii jacinta ku hadasho Soomaali, waaxda luqadaha, qaybta kaalmada adeegyada, cierra seals . So [facility] [phone].    ATENCIÓN: Si habla español, tiene a davis disposición servicios gratuitos de asistencia lingüística. Llame al [phone].    We comply with applicable federal civil rights laws and [place] laws. We do not discriminate on the basis of race, color, national origin, age, disability, sex, sexual orientation, or gender identity.            Thank you!     Thank you for choosing [facility]  for your care. Our goal is always to provide you with excellent care. Hearing back from our patients is one way we can continue to improve our services. Please take a few minutes to complete the written survey that you may receive in the mail after your visit with us. Thank you!             Your Updated Medication List - Protect others around you: Learn how to safely use, store and throw away your medicines at www.disposemymeds.org.          This list is accurate as of: 11/21/17 11:23 AM.  Always use your most recent med list.                   Brand Name Dispense Instructions for use Diagnosis    amLODIPine 10 MG tablet    NORVASC    90 tablet    Take 1 tablet (10 mg) by mouth daily AS DIRECTED    Essential  hypertension, benign       anastrozole 1 MG tablet    ARIMIDEX    90 tablet    Take 1 tablet (1 mg) by mouth daily    Malignant neoplasm of left breast in female, estrogen receptor positive, unspecified site of breast (H)       atorvastatin 40 MG tablet    LIPITOR    90 tablet    Take 1 tablet (40 mg) by mouth daily Please put on profile- pt just received 90-day supply of cholesterol meds    Pure hypercholesterolemia       * cholecalciferol 1000 UNIT tablet    vitamin D3    100 tablet    Take 1 tablet (1,000 Units) by mouth daily    Osteoporosis       * cholecalciferol 5000 UNITS Caps capsule    vitamin D3    120 capsule    Take 1 capsule (5,000 Units) by mouth daily    Vaginal dryness       CoQ10 200 MG Caps      Take 1 capsule by mouth daily        diclofenac 1 % Gel topical gel    VOLTAREN    100 g    Apply 4 grams to knees or 2 grams to hands four times daily using enclosed dosing card.    Primary osteoarthritis of both wrists       gabapentin 300 MG capsule    NEURONTIN    540 capsule    1-2 tablets EVERY 8 HOURS    Neuropathic pain       HYDROcodone-acetaminophen 5-325 MG per tablet    NORCO    40 tablet    Take 1 tablet by mouth every 6 hours as needed    Chronic left-sided low back pain, with sciatica presence unspecified       ketoconazole 2 % shampoo    NIZORAL    120 mL    Apply topically daily as needed for itching or irritation    Dermatitis, seborrheic       levothyroxine 112 MCG tablet    SYNTHROID/LEVOTHROID    45 tablet    Take 1 tablet (112 mcg) by mouth daily Take 0.5 tab month    Hypothyroidism, unspecified type       LORazepam 2 MG tablet    ATIVAN    30 tablet    Take 1 tablet at night for sleep    Sleep disorder       metoprolol 25 MG 24 hr tablet    TOPROL-XL    90 tablet    Take 1 tablet (25 mg) by mouth daily    Irregular heart rate       Multi-vitamin Tabs tablet   Generic drug:  multivitamin, therapeutic with minerals      Take 1 tablet by mouth daily.        OMEGA-3 FISH OIL PO      Take  1 capsule by mouth daily        omeprazole 20 MG CR capsule    priLOSEC     Take 20 mg by mouth daily        ondansetron 4 MG ODT tab    ZOFRAN-ODT    90 tablet    1-2 tabs po prn every 6 hours    Nausea       REPHRESH Gel     14 Box    Place 2 g vaginally every 3 days    Urinary tract infection, site not specified       terbinafine 1 % cream    lamISIL AT    12 g    Apply topically 2 times daily For fungal infection not resolved with other antifungals (e.g. Clotrimazole)    Tinea pedis of both feet       triamcinolone 0.1 % ointment    KENALOG    30 g    Apply topically 2 times daily    Eczematous dermatitis       triamterene-hydrochlorothiazide 37.5-25 MG per tablet    MAXZIDE-25    90 tablet    Take 1 tablet by mouth daily    Essential hypertension, benign       * Notice:  This list has 2 medication(s) that are the same as other medications prescribed for you. Read the directions carefully, and ask your doctor or other care provider to review them with you.

## 2017-11-21 NOTE — PATIENT INSTRUCTIONS
[facility]: [phone]     [facility] Medication Refill Request Information:  * Please contact your pharmacy regarding ANY request for medication refills.  ** [facility]ax = [phone]  * Please allow 3 business days for routine medication refills.  * Please allow 5 business days for controlled substance medication refills.     [facility] Test Result notification information:  *You will be notified with in 7-10 days of your appointment day regarding the results of your test.  If you are on MyChart you will be notified as soon as the provider has reviewed the results and signed off on them.

## 2017-11-22 RX ORDER — LEVOTHYROXINE SODIUM 112 UG/1
TABLET ORAL
Qty: 45 TABLET | Refills: 3 | Status: SHIPPED | OUTPATIENT
Start: 2017-11-22 | End: 2018-10-18

## 2017-11-24 DIAGNOSIS — H91.90 HL (HEARING LOSS): Primary | ICD-10-CM

## 2017-11-27 ENCOUNTER — PRE VISIT (OUTPATIENT)
Dept: OTOLARYNGOLOGY | Facility: CLINIC | Age: 67
End: 2017-11-27

## 2017-11-27 ENCOUNTER — OFFICE VISIT (OUTPATIENT)
Dept: OTOLARYNGOLOGY | Facility: CLINIC | Age: 67
End: 2017-11-27

## 2017-11-27 ENCOUNTER — OFFICE VISIT (OUTPATIENT)
Dept: AUDIOLOGY | Facility: CLINIC | Age: 67
End: 2017-11-27

## 2017-11-27 DIAGNOSIS — H90.3 SENSORINEURAL HEARING LOSS (SNHL) OF BOTH EARS: Primary | ICD-10-CM

## 2017-11-27 DIAGNOSIS — H90.3 SENSORINEURAL HEARING LOSS, BILATERAL: Primary | ICD-10-CM

## 2017-11-27 ASSESSMENT — PAIN SCALES - GENERAL: PAINLEVEL: MILD PAIN (3)

## 2017-11-27 NOTE — PROGRESS NOTES
HISTORY OF PRESENT ILLNESS:  Ms. Perez is here to see us today for the first time.  She notes that she has issues with hearing loss.  She says this generally is a gradual decline.  She has had family members who have suggested that she consider hearing aids.  She does not report any vertigo, tinnitus, otalgia, otorrhea, previous otologic surgical intervention.  She feels both ears are fairly symmetric with regards to her hearing.      PAST MEDICAL HISTORY:  Notable for mastectomy, parathyroidectomy, and hysterectomy.      FAMILY HISTORY:  Noted and reviewed in the chart.      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      PHYSICAL EXAMINATION:  On examination, this is a 67-year-old woman in no acute distress.  Normal mood, normal affect, normal ability to communicate.  Alert all and appropriate.  Head is normocephalic.  Cranial nerves are House-Brackmann I/VI bilaterally.  Breathing without difficulty or stridor.  Eyes are anicteric.  Skin of the head and neck appears normal.  Examination of the mouth shows normal tongue, buccal mucosa, oropharynx.  Palpation of neck shows no masses, tenderness or lymphadenopathy.  Neck is supple.  Musculature of the neck is within normal limits.  There is no thyromegaly appreciated.  Examination of the ears shows normal external auditory canals and tympanic membranes.      AUDIOGRAM:  Audiogram shows a normal downsloping to moderate sensorineural hearing loss bilaterally with normal tympanograms and good word recognition scores.      ASSESSMENT AND PLAN: This is a 67-year-old woman with some moderate high frequency sensorineural hearing loss.  She has good candidate for hearing aids.  We have cleared her for this and she will pursue a trial of hearing aids.      SJ/ms

## 2017-11-27 NOTE — NURSING NOTE
Chief Complaint   Patient presents with     Consult     hearing loss. declined height and weight.     James Luther LPN

## 2017-11-27 NOTE — MR AVS SNAPSHOT
After Visit Summary   11/27/2017    Nadira Perez    MRN: [id number]           Patient Information     Date Of Birth          1950        Visit Information        Provider Department      11/27/2017 8:30 AM Leyda Gil AuD M [facility] Audiology        Today's Diagnoses     Sensorineural hearing loss, bilateral    -  1       Follow-ups after your visit        Your next 10 appointments already scheduled     Nov 29, 2017  9:00 AM CST   [facility] Lab Draw with [facility] LAB DRAW   [facility] Mason[facility] Lab Draw ([facility])    [address] [place] [address]   [phone]            Nov 29, 2017  9:30 AM CST   (Arrive by 9:15 AM)   SHORT with Bee Hudson RPH   [facility] Medication Therapy Management ([facility])    [address] [place] [address] [phone]            Nov 29, 2017 10:30 AM CST   Infusion 60 with  ONCOLOGY INFUSION, UC 17 ATC   [facility] ([facility])    [address] [place] [address] [phone]            Dec 14, 2017  3:40 PM CST   (Arrive by 3:25 PM)   NEW GENERAL with Lucas Madison DO   [facility] Ophthalmology ([facility])    [address] [place] [address] [phone]            Chmap 15, 2018  8:00 AM CST   Hearing Aid Consult with Rik Arauz [facility] Audiology ([facility])    [address] [place] [address]   [phone]            Jan 25, 2018  9:50 AM CST   (Arrive by 9:35 AM)   RETURN HAND with Lien Prajapati MD   [facility] Orthopaedic Clinic ([facility])    [address] [place] [address] [phone]            Feb 05, 2018  9:30 AM CST   Hearing Aid Fitting with Rik Arauz    [facility] Audiology ([facility])    [address] [facility]  [address] [facility] [address] [phone]            Feb 26, 2018  8:30 AM CST   (Arrive by 8:15 AM)   Initial Review Program with Rik Arauz   [facility] Audiology ([facility])    [address]  [address] [facility] [address] [phone]              Who to contact     Please call your clinic at [phone] to:    Ask questions about your health    Make or cancel appointments    Discuss your medicines    Learn about your test results    Speak to your doctor   If you have compliments or concerns about an experience at your clinic, or if you wish to file a complaint, please contact [facility] Physicians Patient Relations at [phone] or email us at [email or web address]         Additional Information About Your Visit        [employer] Information     [employer] gives you secure access to your electronic health record. If you see a primary care provider, you can also send messages to your care team and make appointments. If you have questions, please call your primary care clinic.  If you do not have a primary care provider, please call [phone] and they will assist you.      [employer] is an electronic gateway that provides easy, online access to your medical records. With [employer], you can request a clinic appointment, read your test results, renew a prescription or communicate with your care team.     To access your existing account, please contact your [facility] or call [phone] for assistance.        Care EveryWhere ID     This is your Care EveryWhere ID. This could be used by other organizations to access your [place] medical records  [id number]         Blood Pressure from Last 3 Encounters:   11/21/17 134/81   11/15/17 150/86   08/01/17 125/80    Weight from Last 3 Encounters:   11/21/17 95.3 kg (210  lb)   11/15/17 95.7 kg (210 lb 14.4 oz)   08/14/17 88.5 kg (195 lb)              We Performed the Following     AUDIOGRAM/TYMPANOGRAM - INTERFACE     [facility] Audiometry Thrshld Eval & Speech Recog ([address])     Tymps / Reflex   ([id number])        Primary Care Provider Office Phone # Fax #    Matilde Quiñonez, APRN -[phone]       [address] [place] [address] [address] [place] [address]        Equal Access to Services     EDEL LAMA : Hadii aad ku hadasho Soomaali, waaxda luqadaha, qaybta kaalmada adeegyada, waxay idiin hayaan adeeg kharanathanael seals . So [facility] [phone].    ATENCIÓN: Si habla español, tiene a davis disposición servicios gratuitos de asistencia lingüística. [facility] [phone].    We comply with applicable federal civil rights laws and [place] laws. We do not discriminate on the basis of race, color, national origin, age, disability, sex, sexual orientation, or gender identity.            Thank you!     Thank you for choosing [facility] AUDIOLOGY  for your care. Our goal is always to provide you with excellent care. Hearing back from our patients is one way we can continue to improve our services. Please take a few minutes to complete the written survey that you may receive in the mail after your visit with us. Thank you!             Your Updated Medication List - Protect others around you: Learn how to safely use, store and throw away your medicines at www.disposemymeds.org.          This list is accurate as of: 11/27/17 12:13 PM.  Always use your most recent med list.                   Brand Name Dispense Instructions for use Diagnosis    amLODIPine 10 MG tablet    NORVASC    90 tablet    Take 1 tablet (10 mg) by mouth daily AS DIRECTED    Essential hypertension, benign       anastrozole 1 MG tablet    ARIMIDEX    90 tablet    Take 1 tablet (1 mg) by mouth daily    Malignant neoplasm of left breast in female, estrogen receptor positive, unspecified site of breast (H)       atorvastatin 40 MG  tablet    LIPITOR    90 tablet    Take 1 tablet (40 mg) by mouth daily Please put on profile- pt just received 90-day supply of cholesterol meds    Pure hypercholesterolemia       * cholecalciferol 1000 UNIT tablet    vitamin D3    100 tablet    Take 1 tablet (1,000 Units) by mouth daily    Osteoporosis       * cholecalciferol 5000 UNITS Caps capsule    vitamin D3    120 capsule    Take 1 capsule (5,000 Units) by mouth daily    Vaginal dryness       CoQ10 200 MG Caps      Take 1 capsule by mouth daily        diclofenac 1 % Gel topical gel    VOLTAREN    100 g    Apply 4 grams to knees or 2 grams to hands four times daily using enclosed dosing card.    Primary osteoarthritis of both wrists       gabapentin 300 MG capsule    NEURONTIN    540 capsule    1-2 tablets EVERY 8 HOURS    Neuropathic pain       HYDROcodone-acetaminophen 5-325 MG per tablet    NORCO    40 tablet    Take 1 tablet by mouth every 6 hours as needed    Chronic left-sided low back pain, with sciatica presence unspecified       ketoconazole 2 % shampoo    NIZORAL    120 mL    Apply topically daily as needed for itching or irritation    Dermatitis, seborrheic       levothyroxine 112 MCG tablet    SYNTHROID/LEVOTHROID    45 tablet    Take 0.5 tab daily    Hypothyroidism, unspecified type       LORazepam 2 MG tablet    ATIVAN    30 tablet    Take 1 tablet at night for sleep    Sleep disorder       metoprolol 25 MG 24 hr tablet    TOPROL-XL    90 tablet    Take 1 tablet (25 mg) by mouth daily    Irregular heart rate       Multi-vitamin Tabs tablet   Generic drug:  multivitamin, therapeutic with minerals      Take 1 tablet by mouth daily.        OMEGA-3 FISH OIL PO      Take 1 capsule by mouth daily        omeprazole 20 MG CR capsule    priLOSEC     Take 20 mg by mouth daily        ondansetron 4 MG ODT tab    ZOFRAN-ODT    90 tablet    1-2 tabs po prn every 6 hours    Nausea       REPHRESH Gel     14 Box    Place 2 g vaginally every 3 days    Urinary  tract infection, site not specified       terbinafine 1 % cream    lamISIL AT    12 g    Apply topically 2 times daily For fungal infection not resolved with other antifungals (e.g. Clotrimazole)    Tinea pedis of both feet       triamcinolone 0.1 % ointment    KENALOG    30 g    Apply topically 2 times daily    Eczematous dermatitis       triamterene-hydrochlorothiazide 37.5-25 MG per tablet    MAXZIDE-25    90 tablet    Take 1 tablet by mouth daily    Essential hypertension, benign       * Notice:  This list has 2 medication(s) that are the same as other medications prescribed for you. Read the directions carefully, and ask your doctor or other care provider to review them with you.

## 2017-11-27 NOTE — PROGRESS NOTES
AUDIOLOGY REPORT    SUMMARY: Audiology visit completed. See audiogram for results.      RECOMMENDATIONS: Follow-up with ENT.    Ymaini Lange  Licensed Audiologist  [place] License #[id number]

## 2017-11-27 NOTE — LETTER
11/27/2017       RE: Nadira Perez  [address]  [facility] [address]     Dear Colleague,    Thank you for referring your patient, Nadira Perez, to the [facility] EAR NOSE AND THROAT at [facility]. Please see a copy of my visit note below.    HISTORY OF PRESENT ILLNESS:  Ms. Perez is here to see us today for the first time.  She notes that she has issues with hearing loss.  She says this generally is a gradual decline.  She has had family members who have suggested that she consider hearing aids.  She does not report any vertigo, tinnitus, otalgia, otorrhea, previous otologic surgical intervention.  She feels both ears are fairly symmetric with regards to her hearing.      PAST MEDICAL HISTORY:  Notable for mastectomy, parathyroidectomy, and hysterectomy.      FAMILY HISTORY:  Noted and reviewed in the chart.      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      PHYSICAL EXAMINATION:  On examination, this is a 67-year-old woman in no acute distress.  Normal mood, normal affect, normal ability to communicate.  Alert all and appropriate.  Head is normocephalic.  Cranial nerves are House-Brackmann I/VI bilaterally.  Breathing without difficulty or stridor.  Eyes are anicteric.  Skin of the head and neck appears normal.  Examination of the mouth shows normal tongue, buccal mucosa, oropharynx.  Palpation of neck shows no masses, tenderness or lymphadenopathy.  Neck is supple.  Musculature of the neck is within normal limits.  There is no thyromegaly appreciated.  Examination of the ears shows normal external auditory canals and tympanic membranes.      AUDIOGRAM:  Audiogram shows a normal downsloping to moderate sensorineural hearing loss bilaterally with normal tympanograms and good word recognition scores.      ASSESSMENT AND PLAN: This is a 67-year-old  woman with some moderate high frequency sensorineural hearing loss.  She has good candidate for hearing aids.  We have cleared her for this and she will pursue a trial of hearing aids.      SJ/ms         Again, thank you for allowing me to participate in the care of your patient.      Sincerely,    Bhavik Romero MD

## 2017-11-27 NOTE — PATIENT INSTRUCTIONS
Dr Romero did provide hearing aid clearance. You may make an appointment with audiology on your way out.   Nehemiah Kelly RN  [phone]

## 2017-11-27 NOTE — LETTER
Hearing Aid Medical Clearance    Nadira Perez  November 27, 2017        This patient has received a medical examination and may be considered a suitable candidate for a hearing aid.         Physician:                         Bhavik Romero MD

## 2017-11-27 NOTE — MR AVS SNAPSHOT
After Visit Summary   11/27/2017    Nadira Perez    MRN: [id number]           Patient Information     Date Of Birth          1950        Visit Information        Provider Department      11/27/2017 9:30 AM Bhavik Romero MD [facility] Ear Nose and Throat        Care Instructions    Dr Romero did provide hearing aid clearance. You may make an appointment with audiology on your way out.   Nehemiah Kelly ,RN  [phone]              Follow-ups after your visit        Your next 10 appointments already scheduled     Nov 29, 2017  9:00 AM CST   [facility] Draw with [facility]   [facility]    [address] [place] [address]   [phone]            Nov 29, 2017  9:30 AM CST   (Arrive by 9:15 AM)   [facility]    [address] [place] [address]   [phone]            Nov 29, 2017 10:30 AM CST   Infusion 60 with  ONCOLOGY INFUSION, UC 17 ATC   [facility] ([facility])    [address] [place] [address]   [phone]            Dec 14, 2017  3:40 PM CST   (Arrive by 3:25 PM)   NEW GENERAL with Lucas Madison DO   [facility] Ophthalmology ([facility])    [address] [place] [address]   [phone]            Champ 15, 2018  8:00 AM CST   Hearing Aid Consult with Rik Arauz   [facility] Audiology ([facility])    [address] [place] [address]   [phone]            Jan 25, 2018  9:50 AM CST   (Arrive by 9:35 AM)   RETURN HAND with Lien Prajapati MD   [facility] Orthopaedic Clinic ([facility])    [address] [place] [address]  [address]   [phone]            Feb 05, 2018  9:30 AM CST   Hearing Aid Fitting with Rik Arauz [facility] ([facility])    [address] [place] [id number] [phone]            Feb 26, 2018  8:30 AM CST   (Arrive by 8:15 AM)   Initial Review Program with Rik Arauz [facility] ([facility])    [address] [place] [address] [phone]              Who to contact     Please call your clinic at [phone] to:    Ask questions about your health    Make or cancel appointments    Discuss your medicines    Learn about your test results    Speak to your doctor   If you have compliments or concerns about an experience at your clinic, or if you wish to file a complaint, please contact [facility] Patient Relations at [phone] or email us at [email or web address]         Additional Information About Your Visit        [employer] Information     [employer] gives you secure access to your electronic health record. If you see a primary care provider, you can also send messages to your care team and make appointments. If you have questions, please call your primary care clinic.  If you do not have a primary care provider, please call [phone] and they will assist you.      [employer] is an electronic gateway that provides easy, online access to your medical records. With [employer], you can request a clinic appointment, read your test results, renew a prescription or communicate with your care team.     To access your existing account, please contact your [facility] or call [phone] for assistance.        Care EveryWhere ID     This is your Care EveryWhere ID. This could be used by other organizations to access your [facility] medical records  [id number]         Blood Pressure from Last 3  Encounters:   11/21/17 134/81   11/15/17 150/86   08/01/17 125/80    Weight from Last 3 Encounters:   11/21/17 95.3 kg (210 lb)   11/15/17 95.7 kg (210 lb 14.4 oz)   08/14/17 88.5 kg (195 lb)              Today, you had the following     No orders found for display       Primary Care Provider Office Phone # Fax #    Matilde Quiñonez, APRN [phone]       [address] [place] [address] 741  [facility] [address]        Equal Access to Services     [facility]: Hadii aad ku hadasho Soomaali, waaxda luqadaha, qaybta kaalmada adehariyabaltazar, cierra seals . So [facility] [phone].    ATENCIÓN: Si habla español, tiene a davis disposición servicios gratuitos de asistencia lingüística. [facility] [phone].    We comply with applicable federal civil rights laws and [place] laws. We do not discriminate on the basis of race, color, national origin, age, disability, sex, sexual orientation, or gender identity.            Thank you!     Thank you for choosing [facility] EAR NOSE AND THROAT  for your care. Our goal is always to provide you with excellent care. Hearing back from our patients is one way we can continue to improve our services. Please take a few minutes to complete the written survey that you may receive in the mail after your visit with us. Thank you!             Your Updated Medication List - Protect others around you: Learn how to safely use, store and throw away your medicines at www.disposemymeds.org.          This list is accurate as of: 11/27/17 10:45 AM.  Always use your most recent med list.                   Brand Name Dispense Instructions for use Diagnosis    amLODIPine 10 MG tablet    NORVASC    90 tablet    Take 1 tablet (10 mg) by mouth daily AS DIRECTED    Essential hypertension, benign       anastrozole 1 MG tablet    ARIMIDEX    90 tablet    Take 1 tablet (1 mg) by mouth daily    Malignant neoplasm of left breast in female, estrogen receptor positive, unspecified site  of breast (H)       atorvastatin 40 MG tablet    LIPITOR    90 tablet    Take 1 tablet (40 mg) by mouth daily Please put on profile- pt just received 90-day supply of cholesterol meds    Pure hypercholesterolemia       * cholecalciferol 1000 UNIT tablet    vitamin D3    100 tablet    Take 1 tablet (1,000 Units) by mouth daily    Osteoporosis       * cholecalciferol 5000 UNITS Caps capsule    vitamin D3    120 capsule    Take 1 capsule (5,000 Units) by mouth daily    Vaginal dryness       CoQ10 200 MG Caps      Take 1 capsule by mouth daily        diclofenac 1 % Gel topical gel    VOLTAREN    100 g    Apply 4 grams to knees or 2 grams to hands four times daily using enclosed dosing card.    Primary osteoarthritis of both wrists       gabapentin 300 MG capsule    NEURONTIN    540 capsule    1-2 tablets EVERY 8 HOURS    Neuropathic pain       HYDROcodone-acetaminophen 5-325 MG per tablet    NORCO    40 tablet    Take 1 tablet by mouth every 6 hours as needed    Chronic left-sided low back pain, with sciatica presence unspecified       ketoconazole 2 % shampoo    NIZORAL    120 mL    Apply topically daily as needed for itching or irritation    Dermatitis, seborrheic       levothyroxine 112 MCG tablet    SYNTHROID/LEVOTHROID    45 tablet    Take 0.5 tab daily    Hypothyroidism, unspecified type       LORazepam 2 MG tablet    ATIVAN    30 tablet    Take 1 tablet at night for sleep    Sleep disorder       metoprolol 25 MG 24 hr tablet    TOPROL-XL    90 tablet    Take 1 tablet (25 mg) by mouth daily    Irregular heart rate       Multi-vitamin Tabs tablet   Generic drug:  multivitamin, therapeutic with minerals      Take 1 tablet by mouth daily.        OMEGA-3 FISH OIL PO      Take 1 capsule by mouth daily        omeprazole 20 MG CR capsule    priLOSEC     Take 20 mg by mouth daily        ondansetron 4 MG ODT tab    ZOFRAN-ODT    90 tablet    1-2 tabs po prn every 6 hours    Nausea       REPHRESH Gel     14 Box    Place 2 g  vaginally every 3 days    Urinary tract infection, site not specified       terbinafine 1 % cream    lamISIL AT    12 g    Apply topically 2 times daily For fungal infection not resolved with other antifungals (e.g. Clotrimazole)    Tinea pedis of both feet       triamcinolone 0.1 % ointment    KENALOG    30 g    Apply topically 2 times daily    Eczematous dermatitis       triamterene-hydrochlorothiazide 37.5-25 MG per tablet    MAXZIDE-25    90 tablet    Take 1 tablet by mouth daily    Essential hypertension, benign       * Notice:  This list has 2 medication(s) that are the same as other medications prescribed for you. Read the directions carefully, and ask your doctor or other care provider to review them with you.

## 2017-11-28 ENCOUNTER — TELEPHONE (OUTPATIENT)
Dept: ORTHOPEDICS | Facility: CLINIC | Age: 67
End: 2017-11-28

## 2017-11-28 NOTE — TELEPHONE ENCOUNTER
Returned patients call regarding her request for new thumb spica forearm based splints. Patient would like these splints replaced, she has an appointment in January currently. She stated the splints need to be charged to work comp.  Patients appointment moved up to 12/21, she stated she would like to wait until this appointment to get the splints.

## 2017-11-29 ENCOUNTER — APPOINTMENT (OUTPATIENT)
Dept: LAB | Facility: CLINIC | Age: 67
End: 2017-11-29
Attending: INTERNAL MEDICINE
Payer: MEDICARE

## 2017-11-29 ENCOUNTER — OFFICE VISIT (OUTPATIENT)
Dept: PHARMACY | Facility: CLINIC | Age: 67
End: 2017-11-29
Payer: COMMERCIAL

## 2017-11-29 ENCOUNTER — INFUSION THERAPY VISIT (OUTPATIENT)
Dept: ONCOLOGY | Facility: CLINIC | Age: 67
End: 2017-11-29
Attending: INTERNAL MEDICINE
Payer: MEDICARE

## 2017-11-29 VITALS
DIASTOLIC BLOOD PRESSURE: 75 MMHG | SYSTOLIC BLOOD PRESSURE: 134 MMHG | HEART RATE: 85 BPM | BODY MASS INDEX: 32.65 KG/M2 | WEIGHT: 210 LBS | TEMPERATURE: 98.6 F | OXYGEN SATURATION: 99 % | RESPIRATION RATE: 16 BRPM

## 2017-11-29 DIAGNOSIS — M25.50 MULTIPLE JOINT PAIN: Primary | ICD-10-CM

## 2017-11-29 DIAGNOSIS — Z79.811 AROMATASE INHIBITOR USE: ICD-10-CM

## 2017-11-29 DIAGNOSIS — M85.80 OSTEOPENIA, UNSPECIFIED LOCATION: Primary | ICD-10-CM

## 2017-11-29 DIAGNOSIS — I10 ESSENTIAL HYPERTENSION, BENIGN: ICD-10-CM

## 2017-11-29 DIAGNOSIS — Z17.0 MALIGNANT NEOPLASM OF LEFT BREAST IN FEMALE, ESTROGEN RECEPTOR POSITIVE, UNSPECIFIED SITE OF BREAST (H): ICD-10-CM

## 2017-11-29 DIAGNOSIS — C50.912 MALIGNANT NEOPLASM OF LEFT BREAST IN FEMALE, ESTROGEN RECEPTOR POSITIVE, UNSPECIFIED SITE OF BREAST (H): ICD-10-CM

## 2017-11-29 LAB
ALBUMIN SERPL-MCNC: 3.8 G/DL (ref 3.4–5)
CALCIUM SERPL-MCNC: 8.8 MG/DL (ref 8.5–10.1)
CREAT SERPL-MCNC: 0.52 MG/DL (ref 0.52–1.04)
GFR SERPL CREATININE-BSD FRML MDRD: >90 ML/MIN/1.7M2

## 2017-11-29 PROCEDURE — 25000128 H RX IP 250 OP 636: Mod: ZF | Performed by: PHYSICIAN ASSISTANT

## 2017-11-29 PROCEDURE — 82310 ASSAY OF CALCIUM: CPT | Performed by: PHYSICIAN ASSISTANT

## 2017-11-29 PROCEDURE — 82040 ASSAY OF SERUM ALBUMIN: CPT | Performed by: PHYSICIAN ASSISTANT

## 2017-11-29 PROCEDURE — 82565 ASSAY OF CREATININE: CPT | Performed by: PHYSICIAN ASSISTANT

## 2017-11-29 PROCEDURE — 99607 MTMS BY PHARM ADDL 15 MIN: CPT | Performed by: PHARMACIST

## 2017-11-29 PROCEDURE — 96365 THER/PROPH/DIAG IV INF INIT: CPT

## 2017-11-29 PROCEDURE — 99606 MTMS BY PHARM EST 15 MIN: CPT | Performed by: PHARMACIST

## 2017-11-29 RX ORDER — ZOLEDRONIC ACID 0.04 MG/ML
4 INJECTION, SOLUTION INTRAVENOUS ONCE
Status: COMPLETED | OUTPATIENT
Start: 2017-11-29 | End: 2017-11-29

## 2017-11-29 RX ADMIN — SODIUM CHLORIDE 250 ML: 9 INJECTION, SOLUTION INTRAVENOUS at 11:08

## 2017-11-29 RX ADMIN — ZOLEDRONIC ACID 4 MG: 0.04 INJECTION, SOLUTION INTRAVENOUS at 11:11

## 2017-11-29 ASSESSMENT — PAIN SCALES - GENERAL: PAINLEVEL: NO PAIN (0)

## 2017-11-29 NOTE — NURSING NOTE
Chief Complaint   Patient presents with     Blood Draw     Labs drawn PIV placed by RN. Line flushed with saline. Vs taken and pt checked in for appt     Labs drawn from PIV placed by RN. Line flushed with saline. Vitals taken. Pt checked in for appointment(s).    Maya Banegas RN

## 2017-11-29 NOTE — PATIENT INSTRUCTIONS
Recommendations from today's MTM visit:                                                      1. When you meet with Tony, discuss our plan of reducing amlodipine to 5mg, HCTZ/Triamterene to 12.5mg, and starting losartan at 25-50mg daily, likely increasing to 100mg daily.    2. Give Voltaren another go. If you are interested in medical marijuana, speak with Matilde about it.    Next MTM visit: Schedule with Tony in February or sooner to change your blood pressure meds.    To schedule another MTM appointment, please call the clinic directly or you may call the MTM scheduling line at [phone] or toll-free at [phone].     My Clinical Pharmacist's contact information:                                                      It was a pleasure seeing you today!  Please feel free to contact me with any questions or concerns you have.      Bee Hudson PharmD  Medication Therapy Management Provider  Phone:[phone]  Pager: [phone]    You may receive a survey about the MTM services you received.  I would appreciate your feedback to help me serve you better in the future. Please fill it out and return it when you can. Your comments will be anonymous.

## 2017-11-29 NOTE — PATIENT INSTRUCTIONS
Contact Numbers  [facility]: [phone]  Triage: [phone] (Monday-Friday 8-4:30)  After Hours:  [phone]    Please call the [facility] Triage line if you experience a temperature greater than or equal to 100.4, shaking chills, have uncontrolled nausea, vomiting and/or diarrhea, dizziness, shortness of breath, chest pain, bleeding, unexplained bruising, or if you have any other new/concerning symptoms, questions or concerns.     If it is after hours, weekends, or holidays, please call [phone] to speak to someone regarding your questions or concerns.    If you are having any concerning symptoms or wish to speak to a provider before your next infusion visit, please call your care coordinator or triage to notify them so we can adequately serve you.     If you need a refill on a narcotic prescription or other medication, please call triage before your infusion appointment.             November 2017 Sunday Monday Tuesday Wednesday Thursday Friday Saturday                  1     2     3     4       5     6     7     8     9     10     11       12     13     14     15     [facility] RETURN   12:45 PM   (50 min.)   Dee Franco PA   [facility]     DX HIP/PELVIS/SPINE    1:30 PM   (30 min.)   UCDX1   [facility] Dexa 16     17     18       19     20     21     [facility] PHYSICAL    9:50 AM   (40 min.)   Matilde Quiñonez APRN CNP   [facility]     LAB   12:00 PM   (15 min.)    LAB   [facility] Lab 22     23     24     25       26     27     WALK-IN FROM ENT    8:30 AM   (30 min.)   Leyda Gil AuD   [facility] Audiology     [facility] NEW    9:15 AM   (30 min.)   Bhavik Romero MD   [facility] Ear Nose and Throat 28     29     [facility] MASONIC LAB DRAW    9:00 AM   (15 min.)    [facility] LAB DRAW   [facility] Lab Draw     SHORT    9:30 AM   (30 min.)   Bee Hudson [facility] Medication Therapy Management     [facility] ONC INFUSION 60   10:30 AM   (60  min.)   [facility] ONCOLOGY INFUSION   [facility] 30 December 2017 Sunday Monday Tuesday Wednesday Thursday Friday Saturday                            1     2       3     4     5     6     7     8     9       10     11     12     13     14     [facility] NEW GENERAL    3:25 PM   (20 min.)   Lucas Madison DO   [facility] Ophthalmology 15     16       17     18     19     20     21     [facility] RETURN HAND    7:05 AM   (20 min.)   Lien Prajapati MD   [facility] Orthopaedic Clinic 22     23       24     25     26     27     28     29     30       31                                                Lab Results:  Recent Results (from the past 12 hour(s))   Creatinine    Collection Time: 11/29/17  9:41 AM   Result Value Ref Range    Creatinine 0.52 0.52 - 1.04 mg/dL    GFR Estimate >90 >60 mL/min/1.7m2    GFR Estimate If Black >90 >60 mL/min/1.7m2   Calcium    Collection Time: 11/29/17  9:41 AM   Result Value Ref Range    Calcium 8.8 8.5 - 10.1 mg/dL   Albumin level    Collection Time: 11/29/17  9:41 AM   Result Value Ref Range    Albumin 3.8 3.4 - 5.0 g/dL

## 2017-11-29 NOTE — PROGRESS NOTES
SUBJECTIVE/OBJECTIVE:                Nadira Perez is a 67 year old female coming in for a follow-up visit for Medication Therapy Management.      Chief Complaint: Follow up from our visit on 6/16.  Pt is here to discuss BP follow up and pain management.    Tobacco: No tobacco use  Alcohol: not currently using    Medication Adherence: no issues reported    Chronic Wrist Pain: Pt continues on very rare use of Norco for sciatica and ongoing wrist pain from a fall years ago without resolution of ligament pain. She has tried Voltaren gel only a handful of times because she's not convinced it works. PT is also not convinced that APAP has helped with this pain. She continues to avoid oral NSAIDs. She is somewhat curious about whether she would qualify for medical marijuana.    Hypertension: Pt continues on HCTZ/Triamt 25/37.5mg daily, amlodipine 10mg daily, and metoprolol XR 25mg daily. Pt monitors at home and states readings have been good. Pt is again interested in trying to start lisinopril to reduce urgency and swelling. As she just picked up 3 months of 10mg amlodipine she is wondering if we could send orders and she could just switch when she runs out.    Current labs include:  BP Readings from Last 3 Encounters:   11/21/17 134/81   11/15/17 150/86   08/01/17 125/80     Today's Vitals: There were no vitals taken for this visit.  Lab Results   Component Value Date    A1C 5.9 11/21/2017   .  Lab Results   Component Value Date    CHOL 206 11/21/2017     Lab Results   Component Value Date    TRIG 268 11/21/2017     Lab Results   Component Value Date    HDL 67 11/21/2017     Lab Results   Component Value Date    LDL 85 11/21/2017       Liver Function Studies -   Recent Labs   Lab Test  11/21/17   1159   PROTTOTAL  7.3   ALBUMIN  4.0   BILITOTAL  0.3   ALKPHOS  68   AST  27   ALT  38       Lab Results   Component Value Date    UCRR 49 12/22/2008       Last Basic Metabolic Panel:  Lab Results   Component Value Date      11/21/2017      Lab Results   Component Value Date    POTASSIUM 3.8 11/21/2017     Lab Results   Component Value Date    CHLORIDE 104 11/21/2017     Lab Results   Component Value Date    BUN 11 11/21/2017     Lab Results   Component Value Date    CR 0.52 11/21/2017     GFR Estimate   Date Value Ref Range Status   11/21/2017 >90 >60 mL/min/1.7m2 Final     Comment:     Non  GFR Calc   09/15/2016 >90  Non  GFR Calc   >60 mL/min/1.7m2 Final   10/30/2015 >90  Non  GFR Calc   >60 mL/min/1.7m2 Final     GFR Estimate If Black   Date Value Ref Range Status   11/21/2017 >90 >60 mL/min/1.7m2 Final     Comment:      GFR Calc   09/15/2016 >90   GFR Calc   >60 mL/min/1.7m2 Final   10/30/2015 >90   GFR Calc   >60 mL/min/1.7m2 Final     TSH   Date Value Ref Range Status   11/21/2017 4.80 (H) 0.40 - 4.00 mU/L Final   ]    Most Recent Immunizations   Administered Date(s) Administered     HEPA 10/26/2005     HepB 10/26/2005     Influenza (High Dose) 3 valent vaccine 10/06/2017     Influenza (IIV3) PF 10/01/2014     Influenza Intranasal Vaccine 4 valent 10/02/2015     Influenza Vaccine IM 3yrs+ 4 Valent IIV4 10/13/2016     Pneumococcal (PCV 13) 08/01/2017     Pneumococcal 23 valent 10/01/2008     TD (ADULT, 7+) 04/26/2005     TDAP Vaccine (Boostrix) 11/06/2012     Typhoid IM 04/26/2005     Zoster vaccine, live 11/14/2012       ASSESSMENT:              Current medications were reviewed today as discussed above.      Medication Adherence: no issues identified    Chronic Wrist Pain: Needs improvement. Discussed with patient that questions about certification for medical marijuana should be directed to her primary care provider. We discussed that ideally she give Voltaren gel a better chance (take consistently for a few days) to see if this is helpful, as oral NSAIDs were very helpful in the past.    Hypertension: Needs improvement. We  discussed that I am still open to helping her transition to amlodipine 5mg, Triam/HCTZ 12.5mg, metoprolol XR 25mg, and lisinopril 20-40mg daily to help with urgency and swelling, but that I would like closer observation of the transition than to just send the orders and wait to hear back from her about how the transition went.     PLAN:                  1. Pt to retry Voltaren gel as prescribed.    2. Pt to contact myself or Tony Heller when she is ready to start lisinopril and decrease HCTZ and amlodipine.    I spent 30 minutes with this patient today  . All changes were made via collaborative practice agreement with Matilde Quiñonez. A copy of the visit note was provided to the patient's primary care provider.     Will follow up in 2-3 months.    The patient was given a summary of these recommendations as an after visit summary.    Bee Hudson PharmD  Medication Therapy Management Provider  Phone:[phone]  Pager: [phone]

## 2017-11-29 NOTE — MR AVS SNAPSHOT
After Visit Summary   11/29/2017    Nadira Perez    MRN: [id number]           Patient Information     Date Of Birth          1950        Visit Information        Provider Department      11/29/2017 9:45 AM Bee Hudson [facility] Medication Therapy Management        Care Instructions    Recommendations from today's MTM visit:                                                      1. When you meet with Tony, discuss our plan of reducing amlodipine to 5mg, HCTZ/Triamterene to 12.5mg, and starting losartan at 25-50mg daily, likely increasing to 100mg daily.    2. Give Voltaren another go. If you are interested in medical marijuana, speak with Matilde about it.    Next MTM visit: Schedule with Tony in February or sooner to change your blood pressure meds.    To schedule another MTM appointment, please call the clinic directly or you may call the MTM scheduling line at [phone] or toll-free at [phone].     My Clinical Pharmacist's contact information:                                                      It was a pleasure seeing you today!  Please feel free to contact me with any questions or concerns you have.      Bee Hudson, PharmD  Medication Therapy Management Provider  Phone:[phone]  Pager: [phone]    You may receive a survey about the MTM services you received.  I would appreciate your feedback to help me serve you better in the future. Please fill it out and return it when you can. Your comments will be anonymous.                  Follow-ups after your visit        Your next 10 appointments already scheduled     Nov 29, 2017 10:30 AM CST   Infusion 60 with  ONCOLOGY INFUSION,  17 [facility] ([facility])    [address] [place] [address] [phone]            Dec 14, 2017  3:40 PM CST   (Arrive by 3:25 PM)   NEW GENERAL with Lucas Madison [facility] Ophthalmology  ([facility])    [address] [place] [address]   [phone]            Dec 21, 2017  7:20 AM CST   (Arrive by 7:05 AM)   RETURN HAND with Lien Prajapati MD   [facility] ([facility])    [address] [place] [address]   [phone]            Champ 15, 2018  8:00 AM CST   Hearing Aid Consult with Rik Arauz [facility] Audiology ([facility])    [address] [place] [address]   [phone]            Feb 05, 2018  9:30 AM CST   Hearing Aid Fitting with Rik Arauz [facility] ([facility])    [address] [place] [address]   [phone]            Feb 26, 2018  8:30 AM CST   (Arrive by 8:15 AM)   Initial Review Program with Rik Arauz [facility] ([facility])    [address] [place] [address]   [phone]              Who to contact     If you have questions or need follow up information about today's clinic visit or your schedule please contact [facility] MEDICATION THERAPY MANAGEMENT directly at [phone].  Normal or non-critical lab and imaging results will be communicated to you by MyChart, letter or phone within 4 business days after the clinic has received the results. If you do not hear from us within 7 days, please contact the clinic through Biomodahart or phone. If you have a critical or abnormal lab result, we will notify you by phone as soon as possible.  Submit refill requests through NewsMaven or call your pharmacy and they will forward the refill request to us. Please allow 3 business days for your refill to be completed.          Additional Information About Your Visit        NewsMaven Information     NewsMaven gives you secure access to your electronic health  record. If you see a primary care provider, you can also send messages to your care team and make appointments. If you have questions, please call your primary care clinic.  If you do not have a primary care provider, please call [phone] and they will assist you.        Care EveryWhere ID     This is your Care EveryWhere ID. This could be used by other organizations to access your [place] medical records  [id number]         Blood Pressure from Last 3 Encounters:   11/21/17 134/81   11/15/17 150/86   08/01/17 125/80    Weight from Last 3 Encounters:   11/21/17 210 lb (95.3 kg)   11/15/17 210 lb 14.4 oz (95.7 kg)   08/14/17 195 lb (88.5 kg)              Today, you had the following     No orders found for display       Primary Care Provider Office Phone # Fax #    Matilde Quiñonez, APRN [phone]       [address] [place] [address] 741  [facility] [address]        Equal Access to Services     EDEL LAMA : Hadii aad ku hadasho Soomaali, waaxda luqadaha, qaybta kaalmada adeegyada, waxay idiin hayaan seven kharanathanael seals . So [facility] [phone].    ATENCIÓN: Si habla español, tiene a davis disposición servicios gratuitos de asistencia lingüística. [facility] [phone].    We comply with applicable federal civil rights laws and [place] laws. We do not discriminate on the basis of race, color, national origin, age, disability, sex, sexual orientation, or gender identity.            Thank you!     Thank you for choosing [facility] MEDICATION THERAPY MANAGEMENT  for your care. Our goal is always to provide you with excellent care. Hearing back from our patients is one way we can continue to improve our services. Please take a few minutes to complete the written survey that you may receive in the mail after your visit with us. Thank you!             Your Updated Medication List - Protect others around you: Learn how to safely use, store and throw away your medicines at www.disposemymeds.org.          This  list is accurate as of: 11/29/17 10:21 AM.  Always use your most recent med list.                   Brand Name Dispense Instructions for use Diagnosis    amLODIPine 10 MG tablet    NORVASC    90 tablet    Take 1 tablet (10 mg) by mouth daily AS DIRECTED    Essential hypertension, benign       anastrozole 1 MG tablet    ARIMIDEX    90 tablet    Take 1 tablet (1 mg) by mouth daily    Malignant neoplasm of left breast in female, estrogen receptor positive, unspecified site of breast (H)       atorvastatin 40 MG tablet    LIPITOR    90 tablet    Take 1 tablet (40 mg) by mouth daily Please put on profile- pt just received 90-day supply of cholesterol meds    Pure hypercholesterolemia       * cholecalciferol 1000 UNIT tablet    vitamin D3    100 tablet    Take 1 tablet (1,000 Units) by mouth daily    Osteoporosis       * cholecalciferol 5000 UNITS Caps capsule    vitamin D3    120 capsule    Take 1 capsule (5,000 Units) by mouth daily    Vaginal dryness       CoQ10 200 MG Caps      Take 1 capsule by mouth daily        diclofenac 1 % Gel topical gel    VOLTAREN    100 g    Apply 4 grams to knees or 2 grams to hands four times daily using enclosed dosing card.    Primary osteoarthritis of both wrists       gabapentin 300 MG capsule    NEURONTIN    540 capsule    1-2 tablets EVERY 8 HOURS    Neuropathic pain       HYDROcodone-acetaminophen 5-325 MG per tablet    NORCO    40 tablet    Take 1 tablet by mouth every 6 hours as needed    Chronic left-sided low back pain, with sciatica presence unspecified       ketoconazole 2 % shampoo    NIZORAL    120 mL    Apply topically daily as needed for itching or irritation    Dermatitis, seborrheic       levothyroxine 112 MCG tablet    SYNTHROID/LEVOTHROID    45 tablet    Take 0.5 tab daily    Hypothyroidism, unspecified type       LORazepam 2 MG tablet    ATIVAN    30 tablet    Take 1 tablet at night for sleep    Sleep disorder       metoprolol 25 MG 24 hr tablet    TOPROL-XL    90  tablet    Take 1 tablet (25 mg) by mouth daily    Irregular heart rate       Multi-vitamin Tabs tablet   Generic drug:  multivitamin, therapeutic with minerals      Take 1 tablet by mouth daily.        OMEGA-3 FISH OIL PO      Take 1 capsule by mouth daily        omeprazole 20 MG CR capsule    priLOSEC     Take 20 mg by mouth daily        ondansetron 4 MG ODT tab    ZOFRAN-ODT    90 tablet    1-2 tabs po prn every 6 hours    Nausea       REPHRESH Gel     14 Box    Place 2 g vaginally every 3 days    Urinary tract infection, site not specified       terbinafine 1 % cream    lamISIL AT    12 g    Apply topically 2 times daily For fungal infection not resolved with other antifungals (e.g. Clotrimazole)    Tinea pedis of both feet       triamcinolone 0.1 % ointment    KENALOG    30 g    Apply topically 2 times daily    Eczematous dermatitis       triamterene-hydrochlorothiazide 37.5-25 MG per tablet    MAXZIDE-25    90 tablet    Take 1 tablet by mouth daily    Essential hypertension, benign       * Notice:  This list has 2 medication(s) that are the same as other medications prescribed for you. Read the directions carefully, and ask your doctor or other care provider to review them with you.

## 2017-12-03 DIAGNOSIS — E03.4 HYPOTHYROIDISM DUE TO ACQUIRED ATROPHY OF THYROID: Primary | ICD-10-CM

## 2017-12-14 ENCOUNTER — OFFICE VISIT (OUTPATIENT)
Dept: OPHTHALMOLOGY | Facility: CLINIC | Age: 67
End: 2017-12-14
Payer: COMMERCIAL

## 2017-12-14 DIAGNOSIS — H25.13 NUCLEAR SENILE CATARACT OF BOTH EYES: ICD-10-CM

## 2017-12-14 DIAGNOSIS — H18.529 ANTERIOR BASEMENT MEMBRANE DYSTROPHY: Primary | ICD-10-CM

## 2017-12-14 DIAGNOSIS — H17.9 CORNEAL SCARS, BOTH EYES: ICD-10-CM

## 2017-12-14 ASSESSMENT — CONF VISUAL FIELD
METHOD: COUNTING FINGERS
OS_NORMAL: 1
OD_NORMAL: 1

## 2017-12-14 ASSESSMENT — CUP TO DISC RATIO
OS_RATIO: 0.45
OD_RATIO: 0.4

## 2017-12-14 ASSESSMENT — EXTERNAL EXAM - RIGHT EYE: OD_EXAM: NORMAL

## 2017-12-14 ASSESSMENT — VISUAL ACUITY
OD_CC: 20/40
CORRECTION_TYPE: GLASSES
METHOD: SNELLEN - LINEAR
OS_CC: 20/50
OS_BAT_HIGH: 20/40
OS_CC+: -
OD_BAT_HIGH: 20/40
OS_PH_CC: 20/25-
OD_CC+: +
OS_CC: J3
OD_CC: J2
OD_PH_CC: 20/25

## 2017-12-14 ASSESSMENT — TONOMETRY
OS_IOP_MMHG: 15
OD_IOP_MMHG: 19
IOP_METHOD: ICARE

## 2017-12-14 ASSESSMENT — REFRACTION_MANIFEST
OD_SPHERE: +7.25
OS_ADD: +2.25
OD_ADD: +2.25
OD_AXIS: 003
OS_CYLINDER: +2.00
OS_SPHERE: +6.75
OD_CYLINDER: +1.75
OS_AXIS: 039

## 2017-12-14 ASSESSMENT — EXTERNAL EXAM - LEFT EYE: OS_EXAM: NORMAL

## 2017-12-14 ASSESSMENT — REFRACTION_WEARINGRX
OS_CYLINDER: +2.00
OS_ADD: +2.25
OS_AXIS: 013
OD_ADD: +2.25
OD_CYLINDER: +1.75
OS_SPHERE: +6.75
OD_SPHERE: +7.25
OD_AXIS: 005
SPECS_TYPE: PAL

## 2017-12-14 ASSESSMENT — SLIT LAMP EXAM - LIDS
COMMENTS: NORMAL
COMMENTS: NORMAL

## 2017-12-14 NOTE — NURSING NOTE
Chief Complaints and History of Present Illnesses   Patient presents with     Cataract Evaluation     HPI    Affected eye(s):  Both   Symptoms:     Blurred vision   Dryness      Frequency:  Constant       Do you have eye pain now?:  No      Comments:  She could not be corrected to 20/20 at last eye exam this year. Vision is getting worse both eyes. Difficulty seeing light color font on light background. Also harder to see at night.    Hanane España COT 3:47 PM December 14, 2017

## 2017-12-14 NOTE — MR AVS SNAPSHOT
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)    [address] [place] [address]   [phone]            Feb 21, 2018  1:00 PM CST   Post-Op with Tyson Ruby MD   Eye Clinic ([facility])    [address]  [address] [address]  [facility] [address]   [phone]            Feb 26, 2018  8:30 AM CST   (Arrive by 8:15 AM)   Initial Review Program with Rik Arauz [facility] ([facility])    [address] [place] [address]   [phone]            Feb 28, 2018  1:00 PM CST   Post-Op with Tyson Ruby MD   Eye Clinic ([facility])    [address]  [address] Clin [address]a  [facility] [address]   [phone]            Mar 14, 2018  1:30 PM CDT   Post-Op with Tyson Ruby MD   Eye Clinic ([facility])    [address]  [address] Clin [address]a  [facility] [address]   [phone]            Mar 21, 2018  1:00 PM CDT   Post-Op with Tyson Ruby MD   Eye Clinic  ([facility])    [address]  [address] Clin [address]a  [facility] [address]   [phone]            Apr 11, 2018  1:30 PM CDT   Post-Op with Tyson Ruby MD   Eye Clinic ([facility])    [address]  [address]  [address] Clin 9a  [facility] [address]   [phone]            May 14, 2018 10:00 AM CDT   (Arrive by 9:45 AM)   Return Visit with Khushbu Louise MD   [facility] ([facility])    [address]  [address]nd Floor  [facility] [address] [phone]            May 14, 2018 10:30 AM CDT   Infusion 60 with UC ONCOLOGY INFUSION   [facility] ([facility])    [address]  [address] Floor  [facility] [id number] [phone]              Who to contact     Please call your clinic at [phone] to:    Ask questions about your health    Make or cancel appointments    Discuss your medicines    Learn about your test results    Speak to your doctor   If you have compliments or concerns about an experience at your clinic, or if you wish to file a complaint, please contact [facility] Patient Relations at [phone] or email us at [email or web address]         Additional Information About Your Visit        ViewsIQhart Information     INWEBTURE Limitedt gives you secure access to your electronic health record. If you see a primary care provider, you can also send messages to your care team and make appointments. If you have questions, please call your primary care clinic.  If you do not have a primary care provider, please call [phone] and they will assist you.      Logopro is an electronic gateway that provides easy, online access to your medical records. With Logopro, you can request a clinic appointment, read your test results, renew a prescription or communicate with your care team.     To access your existing account,  please contact your [facility] or call [phone] for assistance.        Care EveryWhere ID     This is your Care EveryWhere ID. This could be used by other organizations to access your [place] medical records  [id number]         Blood Pressure from Last 3 Encounters:   11/29/17 134/75   11/21/17 134/81   11/15/17 150/86    Weight from Last 3 Encounters:   12/21/17 95.3 kg (210 lb)   11/29/17 95.3 kg (210 lb)   11/21/17 95.3 kg (210 lb)              We Performed the Following     Ernestina-Operative Worksheet        Primary Care Provider Office Phone # Fax #    Matilde Quiñonez, APRN [phone]       [address] [place] [address]        Equal Access to Services     EDEL LAMA : Hadii jacinta torres hadasho Soomaali, waaxda luqadaha, qaybta kaalmada adeegyada, waxmoon seals . So [facility] [phone].    ATENCIÓN: Si habla español, tiene a davis disposición servicios gratuitos de asistencia lingüística. Garrick al [phone].    We comply with applicable federal civil rights laws and [place] laws. We do not discriminate on the basis of race, color, national origin, age, disability, sex, sexual orientation, or gender identity.            Thank you!     Thank you for choosing [facility] OPHTHALMOLOGY  for your care. Our goal is always to provide you with excellent care. Hearing back from our patients is one way we can continue to improve our services. Please take a few minutes to complete the written survey that you may receive in the mail after your visit with us. Thank you!             Your Updated Medication List - Protect others around you: Learn how to safely use, store and throw away your medicines at www.disposemymeds.org.          This list is accurate as of: 12/14/17 11:59 PM.  Always use your most recent med list.                   Brand Name Dispense Instructions for use Diagnosis    amLODIPine 10 MG tablet    NORVASC    90  tablet    Take 1 tablet (10 mg) by mouth daily AS DIRECTED    Essential hypertension, benign       anastrozole 1 MG tablet    ARIMIDEX    90 tablet    Take 1 tablet (1 mg) by mouth daily    Malignant neoplasm of left breast in female, estrogen receptor positive, unspecified site of breast (H)       atorvastatin 40 MG tablet    LIPITOR    90 tablet    Take 1 tablet (40 mg) by mouth daily Please put on profile- pt just received 90-day supply of cholesterol meds    Pure hypercholesterolemia       * cholecalciferol 1000 UNIT tablet    vitamin D3    100 tablet    Take 1 tablet (1,000 Units) by mouth daily    Osteoporosis       * cholecalciferol 5000 UNITS Caps capsule    vitamin D3    120 capsule    Take 1 capsule (5,000 Units) by mouth daily    Vaginal dryness       CoQ10 200 MG Caps      Take 1 capsule by mouth daily        diclofenac 1 % Gel topical gel    VOLTAREN    100 g    Apply 4 grams to knees or 2 grams to hands four times daily using enclosed dosing card.    Primary osteoarthritis of both wrists       gabapentin 300 MG capsule    NEURONTIN    540 capsule    1-2 tablets EVERY 8 HOURS    Neuropathic pain       HYDROcodone-acetaminophen 5-325 MG per tablet    NORCO    40 tablet    Take 1 tablet by mouth every 6 hours as needed    Chronic left-sided low back pain, with sciatica presence unspecified       ketoconazole 2 % shampoo    NIZORAL    120 mL    Apply topically daily as needed for itching or irritation    Dermatitis, seborrheic       levothyroxine 112 MCG tablet    SYNTHROID/LEVOTHROID    45 tablet    Take 0.5 tab daily    Hypothyroidism, unspecified type       LORazepam 2 MG tablet    ATIVAN    30 tablet    Take 1 tablet at night for sleep    Sleep disorder       metoprolol 25 MG 24 hr tablet    TOPROL-XL    90 tablet    Take 1 tablet (25 mg) by mouth daily    Irregular heart rate       Multi-vitamin Tabs tablet   Generic drug:  multivitamin, therapeutic with minerals      Take 1 tablet by mouth daily.         OMEGA-3 FISH OIL PO      Take 1 capsule by mouth daily        omeprazole 20 MG CR capsule    priLOSEC     Take 20 mg by mouth daily        ondansetron 4 MG ODT tab    ZOFRAN-ODT    90 tablet    1-2 tabs po prn every 6 hours    Nausea       REPHRESH Gel     14 Box    Place 2 g vaginally every 3 days    Urinary tract infection, site not specified       terbinafine 1 % cream    lamISIL AT    12 g    Apply topically 2 times daily For fungal infection not resolved with other antifungals (e.g. Clotrimazole)    Tinea pedis of both feet       triamcinolone 0.1 % ointment    KENALOG    30 g    Apply topically 2 times daily    Eczematous dermatitis       triamterene-hydrochlorothiazide 37.5-25 MG per tablet    MAXZIDE-25    90 tablet    Take 1 tablet by mouth daily    Essential hypertension, benign       * Notice:  This list has 2 medication(s) that are the same as other medications prescribed for you. Read the directions carefully, and ask your doctor or other care provider to review them with you.

## 2017-12-14 NOTE — PROGRESS NOTES
Tamoxifen just stopped after 5 years    History of RK both eyes in 1980s    Feels that vision has been getting worse OS > OD over the past 2 years.  She has glare from lights at night and has difficulty seeing small print and signs.  She denies new flashes or floaters.  No pain.        Meds:  None        Assessment/Plan:     1. Radial keratotomy (RK) with hyperopic shift                        Hyperopic astigmatism with presbyopia                        Stable exam today                          2. Cataracts, BE                        Becoming more visually significant    Discussed option of new MRx, observation, or cataract surgery    Risks/benefits/alternatives discussed    Patient wishes to proceed with surgery OS then OD    history of RK and (ABMD)    Need IOL calcs and chato    Target emmetropia    No Pseudoexfoliation, moderate dilation       3. Anterior basement membrane dystrophy (ABMD), both eyes                        Stable, no abrasions/erosions     4. Dry eye syndrome/Blepharitis                        Discussed contributing factors                        Recommend lid hygiene and warm compresses                        Recommend preservative free artifical tears frequently    F/u for IOL calcs, topography    Complete documentation of historical and exam elements from today's encounter can be found in the full encounter summary report (not reduplicated in this progress note). I personally obtained the chief complaint(s) and history of present illness.  I confirmed and edited as necessary the review of systems, past medical/surgical history, family history, social history, and examination findings as documented by others; and I examined the patient myself. I personally reviewed the relevant tests, images, and reports as documented above. I formulated and edited as necessary the assessment and plan and discussed the findings and management plan with the patient and family.     Lucas Madison, DO

## 2017-12-20 DIAGNOSIS — M79.642 BILATERAL HAND PAIN: Primary | ICD-10-CM

## 2017-12-20 DIAGNOSIS — M79.641 BILATERAL HAND PAIN: Primary | ICD-10-CM

## 2017-12-21 ENCOUNTER — OFFICE VISIT (OUTPATIENT)
Dept: ORTHOPEDICS | Facility: CLINIC | Age: 67
End: 2017-12-21
Payer: OTHER MISCELLANEOUS

## 2017-12-21 ENCOUNTER — RADIANT APPOINTMENT (OUTPATIENT)
Dept: GENERAL RADIOLOGY | Facility: CLINIC | Age: 67
End: 2017-12-21
Attending: ORTHOPAEDIC SURGERY
Payer: OTHER MISCELLANEOUS

## 2017-12-21 VITALS — BODY MASS INDEX: 32.96 KG/M2 | HEIGHT: 67 IN | WEIGHT: 210 LBS

## 2017-12-21 DIAGNOSIS — M19.141 POST-TRAUMATIC OSTEOARTHRITIS OF BOTH HANDS: Primary | ICD-10-CM

## 2017-12-21 DIAGNOSIS — M19.142 POST-TRAUMATIC OSTEOARTHRITIS OF BOTH HANDS: Primary | ICD-10-CM

## 2017-12-21 DIAGNOSIS — M79.642 BILATERAL HAND PAIN: ICD-10-CM

## 2017-12-21 DIAGNOSIS — M79.641 BILATERAL HAND PAIN: ICD-10-CM

## 2017-12-21 NOTE — MR AVS SNAPSHOT
After Visit Summary   12/21/2017    Nadira Perez    MRN: [id number]           Patient Information     Date Of Birth          1950        Visit Information        Provider Department      12/21/2017 7:20 AM Lien Prajapati MD [facility]         Follow-ups after your visit        Your next 10 appointments already scheduled     Dec 22, 2017  3:15 PM CST   RETURN CORNEA with Rosy Barba MD   Eye Clinic ([facility])    [address]  [address] [place] [address] Clin 9a  [facility] [address]   [phone]            Champ 15, 2018  8:00 AM CST   Hearing Aid Consult with Rik Arauz [facility] ([facility])    [address] [place] [address]   [phone]            Feb 05, 2018  9:30 AM CST   Hearing Aid Fitting with Rik Arauz [facility] ([facility])    [address] [place] [address]   [phone]            Feb 26, 2018  8:30 AM CST   (Arrive by 8:15 AM)   Initial Review Program with Rik Arauz [facility] ([facility])    [address] [place] [address]   [phone]            May 14, 2018  9:30 AM CDT   [facility] Lab Draw with [facility] LAB DRAW   [facility] Lab Draw ([facility])    [address] [place] [id number] [phone]            May 14, 2018 10:00 AM CDT   (Arrive by 9:45 AM)   Return Visit with Khushbu Louise MD   [facility] ([facility])    [address] [place] [address]   [phone]            May 14, 2018 10:30 AM CDT   Infusion 60 with UC ONCOLOGY INFUSION, UC 31 ATC   [facility] ([facility])    [address] [place] [address]  "Se  2nd [facility] [id number] [phone]              Who to contact     Please call your clinic at [phone] to:    Ask questions about your health    Make or cancel appointments    Discuss your medicines    Learn about your test results    Speak to your doctor   If you have compliments or concerns about an experience at your clinic, or if you wish to file a complaint, please contact [facility] Patient Relations at [phone] or email us at [email or web address]         Additional Information About Your Visit        [employer]hart Information     [employer] gives you secure access to your electronic health record. If you see a primary care provider, you can also send messages to your care team and make appointments. If you have questions, please call your primary care clinic.  If you do not have a primary care provider, please call [phone] and they will assist you.      [employer] is an electronic gateway that provides easy, online access to your medical records. With [employer], you can request a clinic appointment, read your test results, renew a prescription or communicate with your care team.     To access your existing account, please contact your [facility] or call [phone] for assistance.        Care EveryWhere ID     This is your Care EveryWhere ID. This could be used by other organizations to access your [place] medical records  [id number]        Your Vitals Were     Height BMI (Body Mass Index)                1.708 m (5' 7.25\") 32.65 kg/m2           Blood Pressure from Last 3 Encounters:   11/29/17 134/75   11/21/17 134/81   11/15/17 150/86    Weight from Last 3 Encounters:   12/21/17 95.3 kg (210 lb)   11/29/17 95.3 kg (210 lb)   11/21/17 95.3 kg (210 lb)              Today, you had the following     No orders found for display       Primary Care Provider Office Phone # Fax #    MERCEDES Rivera [phone] " [phone]       [address] [place] [address] 741  [facility] [address]        Equal Access to Services     EDEL LAMA : Hadii aad ku hadchelsiedottie Mikeali, wamaggyda lurauljuan joseha, qaybta kaniviada simbaherlinda, cierra onealin hayaazana cottrellhari abreunathanael daugherty. So [facility] [phone].    ATENCIÓN: Si habla español, tiene a davis disposición servicios gratuitos de asistencia lingüística. Juaname al [phone].    We comply with applicable federal civil rights laws and [place] laws. We do not discriminate on the basis of race, color, national origin, age, disability, sex, sexual orientation, or gender identity.            Thank you!     Thank you for choosing [facility]  for your care. Our goal is always to provide you with excellent care. Hearing back from our patients is one way we can continue to improve our services. Please take a few minutes to complete the written survey that you may receive in the mail after your visit with us. Thank you!             Your Updated Medication List - Protect others around you: Learn how to safely use, store and throw away your medicines at www.disposemymeds.org.          This list is accurate as of: 12/21/17  7:37 AM.  Always use your most recent med list.                   Brand Name Dispense Instructions for use Diagnosis    amLODIPine 10 MG tablet    NORVASC    90 tablet    Take 1 tablet (10 mg) by mouth daily AS DIRECTED    Essential hypertension, benign       anastrozole 1 MG tablet    ARIMIDEX    90 tablet    Take 1 tablet (1 mg) by mouth daily    Malignant neoplasm of left breast in female, estrogen receptor positive, unspecified site of breast (H)       atorvastatin 40 MG tablet    LIPITOR    90 tablet    Take 1 tablet (40 mg) by mouth daily Please put on profile- pt just received 90-day supply of cholesterol meds    Pure hypercholesterolemia       * cholecalciferol 1000 UNIT tablet    vitamin D3    100 tablet    Take 1 tablet (1,000 Units) by mouth daily    Osteoporosis       *  cholecalciferol 5000 UNITS Caps capsule    vitamin D3    120 capsule    Take 1 capsule (5,000 Units) by mouth daily    Vaginal dryness       CoQ10 200 MG Caps      Take 1 capsule by mouth daily        diclofenac 1 % Gel topical gel    VOLTAREN    100 g    Apply 4 grams to knees or 2 grams to hands four times daily using enclosed dosing card.    Primary osteoarthritis of both wrists       gabapentin 300 MG capsule    NEURONTIN    540 capsule    1-2 tablets EVERY 8 HOURS    Neuropathic pain       HYDROcodone-acetaminophen 5-325 MG per tablet    NORCO    40 tablet    Take 1 tablet by mouth every 6 hours as needed    Chronic left-sided low back pain, with sciatica presence unspecified       ketoconazole 2 % shampoo    NIZORAL    120 mL    Apply topically daily as needed for itching or irritation    Dermatitis, seborrheic       levothyroxine 112 MCG tablet    SYNTHROID/LEVOTHROID    45 tablet    Take 0.5 tab daily    Hypothyroidism, unspecified type       LORazepam 2 MG tablet    ATIVAN    30 tablet    Take 1 tablet at night for sleep    Sleep disorder       metoprolol 25 MG 24 hr tablet    TOPROL-XL    90 tablet    Take 1 tablet (25 mg) by mouth daily    Irregular heart rate       Multi-vitamin Tabs tablet   Generic drug:  multivitamin, therapeutic with minerals      Take 1 tablet by mouth daily.        OMEGA-3 FISH OIL PO      Take 1 capsule by mouth daily        omeprazole 20 MG CR capsule    priLOSEC     Take 20 mg by mouth daily        ondansetron 4 MG ODT tab    ZOFRAN-ODT    90 tablet    1-2 tabs po prn every 6 hours    Nausea       REPHRESH Gel     14 Box    Place 2 g vaginally every 3 days    Urinary tract infection, site not specified       terbinafine 1 % cream    lamISIL AT    12 g    Apply topically 2 times daily For fungal infection not resolved with other antifungals (e.g. Clotrimazole)    Tinea pedis of both feet       triamcinolone 0.1 % ointment    KENALOG    30 g    Apply topically 2 times daily     Eczematous dermatitis       triamterene-hydrochlorothiazide 37.5-25 MG per tablet    MAXZIDE-25    90 tablet    Take 1 tablet by mouth daily    Essential hypertension, benign       * Notice:  This list has 2 medication(s) that are the same as other medications prescribed for you. Read the directions carefully, and ask your doctor or other care provider to review them with you.

## 2017-12-21 NOTE — NURSING NOTE
"Reason For Visit:   Chief Complaint   Patient presents with     Consult     The patient is here today to discuss her bilateral hand complaints. This is a W/C injury and the patient is requesting new braces.        Primary MD: Matilde Quiñonez      Age: 67 year old    ?  No      Ht 1.708 m (5' 7.25\")  Wt 95.3 kg (210 lb)  BMI 32.65 kg/m2      Pain Assessment  Patient Currently in Pain: Yes  0-10 Pain Scale: 4 (left 4/10  right 2/10)  Primary Pain Location: Head  Pain Orientation: Left, Right  Pain Descriptors: Aching, Constant    Hand Dominance Evaluation  Hand Dominance: Right  Pinch force  R hand key force: 5.443 kg (12 lb)  L hand key force: 5.443 kg (12 lb)   force  R hand  level 2 force: 18.1 kg (40 lb)  L hand  level  2 force: 13.6 kg (30 lb)    QuickDASH Assessment  QuickDASH Main 12/22/2016   1.Open a tight or new jar. Severe difficulty   2. Do heavy household chores (e.g., wash walls, floors) Moderate difficulty   3. Carry a shopping bag or briefcase. Moderate difficulty   4. Wash your back. Mild difficulty   5. Use a knife to cut food. Mild difficulty   6. Recreational activities in which you take some force or impact through your arm, shoulder or hand (e.g., golf, hammering, tennis, etc.). Unable   7. During the past week, to what extent has your arm, shoulder or hand problem interfered with your normal social activities with family, friends, neighbours or groups? Slightly   8. During the past week, were you limited in your work or other regular daily activities as a result of your arm, shoulder or hand problem? Slightly limited   9. Arm, shoulder or hand pain. Moderate   10.Tingling (pins and needles) in your arm,shoulder or hand. None   11. During the past week, how much difficulty have you had sleeping because of the pain in your arm, shoulder or hand? ([place] number) No difficulty   Quickdash Ability Score 38.63   1. Open a tight or new jar. -   2. Do heavy household chores " (e.g., wash walls, floors). -   3. Carry a shopping bag or briefcase. -   4. Wash your back. -   5. Use a knife to cut food. -   6. Recreational activities in which you take some force or impact through your arm, shoulder or hand (e.g., golf, hammering, tennis, etc.). -   7. During the past week, to what extent has your arm, shoulder or hand problem interfered with your normal social activities with family, friends, neighbours or groups? -   8. During the past week, were you limited in your work or other regular daily activities as a result of your arm, shoulder or hand problem? -   9. Arm, shoulder or hand pain. -   10. Tingling (pins and needles) in your arm,shoulder or hand. -   11. During the past week, how much difficulty have you had sleeping because of the pain in your arm, shoulder or hand? ([place] number) -   SUM: -          Current Outpatient Prescriptions   Medication Sig Dispense Refill     levothyroxine (SYNTHROID/LEVOTHROID) 112 MCG tablet Take 0.5 tab daily 45 tablet 3     HYDROcodone-acetaminophen (NORCO) 5-325 MG per tablet Take 1 tablet by mouth every 6 hours as needed 40 tablet 0     anastrozole (ARIMIDEX) 1 MG tablet Take 1 tablet (1 mg) by mouth daily 90 tablet 3     triamterene-hydrochlorothiazide (MAXZIDE-25) 37.5-25 MG per tablet Take 1 tablet by mouth daily 90 tablet 3     amLODIPine (NORVASC) 10 MG tablet Take 1 tablet (10 mg) by mouth daily AS DIRECTED 90 tablet 3     atorvastatin (LIPITOR) 40 MG tablet Take 1 tablet (40 mg) by mouth daily Please put on profile- pt just received 90-day supply of cholesterol meds 90 tablet 3     gabapentin (NEURONTIN) 300 MG capsule 1-2 tablets EVERY 8 HOURS 540 capsule 3     metoprolol (TOPROL-XL) 25 MG 24 hr tablet Take 1 tablet (25 mg) by mouth daily 90 tablet 3     Coenzyme Q10 (COQ10) 200 MG CAPS Take 1 capsule by mouth daily       omeprazole (PRILOSEC) 20 MG CR capsule Take 20 mg by mouth daily       ketoconazole (NIZORAL) 2 % shampoo Apply topically  daily as needed for itching or irritation 120 mL 11     terbinafine (LAMISIL AT) 1 % cream Apply topically 2 times daily For fungal infection not resolved with other antifungals (e.g. Clotrimazole) 12 g 1     ondansetron (ZOFRAN-ODT) 4 MG ODT tab 1-2 tabs po prn every 6 hours 90 tablet 2     diclofenac (VOLTAREN) 1 % GEL topical gel Apply 4 grams to knees or 2 grams to hands four times daily using enclosed dosing card. 100 g 1     Omega-3 Fatty Acids (OMEGA-3 FISH OIL PO) Take 1 capsule by mouth daily        LORazepam (ATIVAN) 2 MG tablet Take 1 tablet at night for sleep 30 tablet 1     cholecalciferol (VITAMIN D) 1000 UNIT tablet Take 1 tablet (1,000 Units) by mouth daily 100 tablet 3     cholecalciferol (VITAMIN D3) 5000 UNITS CAPS capsule Take 1 capsule (5,000 Units) by mouth daily 120 capsule 3     triamcinolone (KENALOG) 0.1 % ointment Apply topically 2 times daily 30 g 1     Vaginal Lubricant (REPHRESH) GEL Place 2 g vaginally every 3 days 14 Box 3     Multiple Vitamin (MULTI-VITAMIN) per tablet Take 1 tablet by mouth daily.         Allergies   Allergen Reactions     Penicillins Hives       Dee Padilla, ATC

## 2017-12-21 NOTE — LETTER
12/21/2017       RE: Nadira Perez  [address]  [facility] [address]     Dear Colleague,    Thank you for referring your patient, Nadira Perez, to the [facility] at [facility]. Please see a copy of my visit note below.     67 year old RHD dominant female referred by Dr. Quiñonez for evaluation and treatment of bilateral hand pain.  Pain 4 on scale of 0- 10.    PMH: DJD, HTN  ROS: Work related injury  PSH: four corner fusions bilaterally with scaphoidectomy  PE: 67 year old delightful cooperative female with visible DJD of wrists and limited, painful mobility.  ROM documented.   40 lbs right and 30 lbs left.  Pinch 12 lbs bilateral.  Neurovascular exam performed.   XRAYS: :  1. Left wrist: Post surgical changes of four corner fusion and  scaphoidectomy. The most proximal screw tip extends more proximally  towards the distal radiocarpal joint space with increased adjacent  bony fragmentation. Question loosening of this hardware.  2. Right wrist: Postsurgical changes of four corner fusion and  scaphoidectomy. Irregularity of the dorsal second distal  interphalangeal joint may be posttraumatic, technically age  indeterminant but may be remote given lack of soft tissue swelling.   3. Diffuse osteopenia and polyarticular degenerative changes as  outlined above.    IMPRESSION: Bilateral wrist DJD    PLAN: Treatment options discussed.  At this time, she wishes to continue with brace treatment.  New braces will be fabricated today by hand OT.  Lien Prajapati MD

## 2017-12-22 ENCOUNTER — TELEPHONE (OUTPATIENT)
Dept: OPHTHALMOLOGY | Facility: CLINIC | Age: 67
End: 2017-12-22

## 2017-12-22 ENCOUNTER — ALLIED HEALTH/NURSE VISIT (OUTPATIENT)
Dept: OPHTHALMOLOGY | Facility: CLINIC | Age: 67
End: 2017-12-22
Attending: OPHTHALMOLOGY
Payer: MEDICARE

## 2017-12-22 DIAGNOSIS — H25.13 NUCLEAR SENILE CATARACT OF BOTH EYES: ICD-10-CM

## 2017-12-22 DIAGNOSIS — H18.529 ANTERIOR BASEMENT MEMBRANE DYSTROPHY: Primary | ICD-10-CM

## 2017-12-22 ASSESSMENT — PACHYMETRY
OS_CT(UM): 537
OD_CT(UM): 598

## 2017-12-22 NOTE — MR AVS SNAPSHOT
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    -  1    Nuclear senile cataract of both eyes           Follow-ups after your visit        Your next 10 appointments already scheduled     Champ 15, 2018  8:00 AM CST   Hearing Aid Consult with Rik Arauz [facility] ([facility])    [address] [place] [address]   [phone]            Feb 05, 2018  9:30 AM CST   Hearing Aid Fitting with Rik Arauz [facility] ([facility])    [address] [place] [address]   [phone]            Feb 26, 2018  8:30 AM CST   (Arrive by 8:15 AM)   Initial Review Program with Rik Arauz [facility] ([facility])    [address] [place] [address] [phone]            May 14, 2018  9:30 AM CDT   [facility] Lab Draw with [facility] LAB DRAW   [facility] Lab Draw ([facility])    [address] [place] [id number] [phone]            May 14, 2018 10:00 AM CDT   (Arrive by 9:45 AM)   Return Visit with Khushbu Louise MD   [facility] ([facility])    [address] [place] [id number] [phone]            May 14, 2018 10:30 AM CDT   Infusion 60 with UC ONCOLOGY INFUSION, UC 31 ATC   [facility] ([facility])    [address] [place] [id number] [phone]              Who to contact     Please call your clinic at [phone]  to:    Ask questions about your health    Make or cancel appointments    Discuss your medicines    Learn about your test results    Speak to your doctor   If you have compliments or concerns about an experience at your clinic, or if you wish to file a complaint, please contact [facility] Patient Relations at [phone] or email us at [email or web address]         Additional Information About Your Visit        [employer] Information     [employer] gives you secure access to your electronic health record. If you see a primary care provider, you can also send messages to your care team and make appointments. If you have questions, please call your primary care clinic.  If you do not have a primary care provider, please call [phone] and they will assist you.      [employer] is an electronic gateway that provides easy, online access to your medical records. With [employer], you can request a clinic appointment, read your test results, renew a prescription or communicate with your care team.     To access your existing account, please contact your [facility] or call [phone] for assistance.        Care EveryWhere ID     This is your Care EveryWhere ID. This could be used by other organizations to access your [place] medical records  [id number]         Blood Pressure from Last 3 Encounters:   11/29/17 134/75   11/21/17 134/81   11/15/17 150/86    Weight from Last 3 Encounters:   12/21/17 95.3 kg (210 lb)   11/29/17 95.3 kg (210 lb)   11/21/17 95.3 kg (210 lb)              Today, you had the following     No orders found for display       Primary Care Provider Office Phone # Fax #    Matilde MERCEDES Taylor [phone]       [address] [place] [address] 741  [facility] [address]        Equal Access to Services     EDEL LAMA : Sonali Russo, lynda manuel, cierra penny.  So [facility] [phone].    ATENCIÓN: Si adrián helton, tiene a davis disposición servicios gratuitos de asistencia lingüística. Garrick marvin [phone].    We comply with applicable federal civil rights laws and [place] laws. We do not discriminate on the basis of race, color, national origin, age, disability, sex, sexual orientation, or gender identity.            Thank you!     Thank you for choosing EYE CLINIC  for your care. Our goal is always to provide you with excellent care. Hearing back from our patients is one way we can continue to improve our services. Please take a few minutes to complete the written survey that you may receive in the mail after your visit with us. Thank you!             Your Updated Medication List - Protect others around you: Learn how to safely use, store and throw away your medicines at www.disposemymeds.org.          This list is accurate as of: 12/22/17 11:59 PM.  Always use your most recent med list.                   Brand Name Dispense Instructions for use Diagnosis    amLODIPine 10 MG tablet    NORVASC    90 tablet    Take 1 tablet (10 mg) by mouth daily AS DIRECTED    Essential hypertension, benign       anastrozole 1 MG tablet    ARIMIDEX    90 tablet    Take 1 tablet (1 mg) by mouth daily    Malignant neoplasm of left breast in female, estrogen receptor positive, unspecified site of breast (H)       atorvastatin 40 MG tablet    LIPITOR    90 tablet    Take 1 tablet (40 mg) by mouth daily Please put on profile- pt just received 90-day supply of cholesterol meds    Pure hypercholesterolemia       * cholecalciferol 1000 UNIT tablet    vitamin D3    100 tablet    Take 1 tablet (1,000 Units) by mouth daily    Osteoporosis       * cholecalciferol 5000 UNITS Caps capsule    vitamin D3    120 capsule    Take 1 capsule (5,000 Units) by mouth daily    Vaginal dryness       CoQ10 200 MG Caps      Take 1 capsule by mouth daily        diclofenac 1 % Gel topical gel    VOLTAREN    100 g     Apply 4 grams to knees or 2 grams to hands four times daily using enclosed dosing card.    Primary osteoarthritis of both wrists       gabapentin 300 MG capsule    NEURONTIN    540 capsule    1-2 tablets EVERY 8 HOURS    Neuropathic pain       HYDROcodone-acetaminophen 5-325 MG per tablet    NORCO    40 tablet    Take 1 tablet by mouth every 6 hours as needed    Chronic left-sided low back pain, with sciatica presence unspecified       ketoconazole 2 % shampoo    NIZORAL    120 mL    Apply topically daily as needed for itching or irritation    Dermatitis, seborrheic       levothyroxine 112 MCG tablet    SYNTHROID/LEVOTHROID    45 tablet    Take 0.5 tab daily    Hypothyroidism, unspecified type       LORazepam 2 MG tablet    ATIVAN    30 tablet    Take 1 tablet at night for sleep    Sleep disorder       metoprolol 25 MG 24 hr tablet    TOPROL-XL    90 tablet    Take 1 tablet (25 mg) by mouth daily    Irregular heart rate       Multi-vitamin Tabs tablet   Generic drug:  multivitamin, therapeutic with minerals      Take 1 tablet by mouth daily.        OMEGA-3 FISH OIL PO      Take 1 capsule by mouth daily        omeprazole 20 MG CR capsule    priLOSEC     Take 20 mg by mouth daily        ondansetron 4 MG ODT tab    ZOFRAN-ODT    90 tablet    1-2 tabs po prn every 6 hours    Nausea       REPHRESH Gel     14 Box    Place 2 g vaginally every 3 days    Urinary tract infection, site not specified       terbinafine 1 % cream    lamISIL AT    12 g    Apply topically 2 times daily For fungal infection not resolved with other antifungals (e.g. Clotrimazole)    Tinea pedis of both feet       triamcinolone 0.1 % ointment    KENALOG    30 g    Apply topically 2 times daily    Eczematous dermatitis       triamterene-hydrochlorothiazide 37.5-25 MG per tablet    MAXZIDE-25    90 tablet    Take 1 tablet by mouth daily    Essential hypertension, benign       * Notice:  This list has 2 medication(s) that are the same as other  medications prescribed for you. Read the directions carefully, and ask your doctor or other care provider to review them with you.

## 2018-01-02 NOTE — PROGRESS NOTES
67 year old RHD dominant female referred by Dr. Quiñonez for evaluation and treatment of bilateral hand pain.  Pain 4 on scale of 0- 10.    PMH: DJD, HTN  ROS: Work related injury  PSH: four corner fusions bilaterally with scaphoidectomy  PE: 67 year old delightful cooperative female with visible DJD of wrists and limited, painful mobility.  ROM documented.   40 lbs right and 30 lbs left.  Pinch 12 lbs bilateral.  Neurovascular exam performed.   XRAYS: :  1. Left wrist: Post surgical changes of four corner fusion and  scaphoidectomy. The most proximal screw tip extends more proximally  towards the distal radiocarpal joint space with increased adjacent  bony fragmentation. Question loosening of this hardware.  2. Right wrist: Postsurgical changes of four corner fusion and  scaphoidectomy. Irregularity of the dorsal second distal  interphalangeal joint may be posttraumatic, technically age  indeterminant but may be remote given lack of soft tissue swelling.   3. Diffuse osteopenia and polyarticular degenerative changes as  outlined above.    IMPRESSION: Bilateral wrist DJD    PLAN: Treatment options discussed.  At this time, she wishes to continue with brace treatment.  New braces will be fabricated today by hand OT.

## 2018-01-15 ENCOUNTER — OFFICE VISIT (OUTPATIENT)
Dept: AUDIOLOGY | Facility: CLINIC | Age: 68
End: 2018-01-15
Payer: COMMERCIAL

## 2018-01-15 DIAGNOSIS — H90.3 SENSORINEURAL HEARING LOSS, BILATERAL: Primary | ICD-10-CM

## 2018-01-15 NOTE — MR AVS SNAPSHOT
After Visit Summary   1/15/2018    Nadira Perez    MRN: [id number]           Patient Information     Date Of Birth          1950        Visit Information        Provider Department      1/15/2018 8:00 AM Leyda Keyes AuD M [facility] Audiology        Today's Diagnoses     Sensorineural hearing loss, bilateral    -  1       Follow-ups after your visit        Your next 10 appointments already scheduled     Feb 05, 2018  9:30 AM CST   Hearing Aid Fitting with Rik Arauz [facility] ([facility])    [address] [place] [address]   [phone]            Feb 15, 2018  6:20 PM CST   (Arrive by 6:05 PM)   PRE-OP with MERCEDES Rivera [facility]   MARCELINO [facility] ([facility])    [address] [place] [address]   [phone]            Feb 21, 2018  1:00 PM CST   Post-Op with Tyson Ruby MD   Eye Clinic ([facility])    [address]  [address]  [address] Clin 9a  [facility] [address]   [phone]            Feb 26, 2018  8:30 AM CST   (Arrive by 8:15 AM)   Initial Review Program with Rik Arauz [facility] ([facility])    [address] [place] [address]   [phone]            Feb 28, 2018  1:00 PM CST   Post-Op with Tyson Ruby MD   Eye Clinic ([facility])    [address]  [address]  [address] Clin 9a  [facility] [address]   [phone]            Mar 14, 2018  1:30 PM CDT   Post-Op with Tyson Ruby MD   Eye Clinic ([facility])    [address]  [address]  [address] Clin [address]a  [facility] [address]   [phone]            Mar 21, 2018  1:00 PM CDT   Post-Op with Tyson Ruby MD   Eye Clinic ([facility])    [address]  [address] [facility] [address]  [address]th [address]  Clin 9a  [facility] [address]   [phone]            Apr 11, 2018  1:30 PM CDT   Post-Op with Tyson Ruby MD   Eye Clinic ([facility])    [address] [facility]  [address] [address]  9th [address] Clin 9a  [facility] [address]   [phone]            May 14, 2018 10:00 AM CDT   (Arrive by 9:45 AM)   Return Visit with Khushbu Louise MD   [facility] ([facility])    [address]  [address]  [facility] [address] [phone]            May 14, 2018 10:30 AM CDT   Infusion 60 with UC ONCOLOGY INFUSION   [facility])    [address]  [address]  [facility] [address] [phone]              Who to contact     Please call your clinic at [phone] to:    Ask questions about your health    Make or cancel appointments    Discuss your medicines    Learn about your test results    Speak to your doctor   If you have compliments or concerns about an experience at your clinic, or if you wish to file a complaint, please contact [facility] Patient Relations at [phone] or email us at [email or web address]         Additional Information About Your Visit        MyChart Information     [employer] gives you secure access to your electronic health record. If you see a primary care provider, you can also send messages to your care team and make appointments. If you have questions, please call your primary care clinic.  If you do not have a primary care provider, please call [phone] and they will assist you.      [employer] is an electronic gateway that provides easy, online access to your medical records. With [employer], you can request a clinic appointment, read your test results, renew a prescription or communicate with your care team.     To access your existing account, please contact your [facility] or call  [phone] for assistance.        Care EveryWhere ID     This is your Care EveryWhere ID. This could be used by other organizations to access your [place] medical records  [id number]         Blood Pressure from Last 3 Encounters:   11/29/17 134/75   11/21/17 134/81   11/15/17 150/86    Weight from Last 3 Encounters:   12/21/17 95.3 kg (210 lb)   11/29/17 95.3 kg (210 lb)   11/21/17 95.3 kg (210 lb)              We Performed the Following     Hearing Aid Exam, Binaural (40380)        Primary Care Provider Office Phone # Fax #    Matilde Quiñonez, APRN -[phone]       [address] [place] [address] [address] [place] [address]        Equal Access to Services     EDEL LAMA : Hadii jacinta torres hadasho Sosukhdev, waaxda luqadaha, qaybta kaalmada adeegyada, [address] hayjuan seals . So [facility] [phone].    ATENCIÓN: Si habla español, tiene a davis disposición servicios gratuitos de asistencia lingüística. Garrick al [phone].    We comply with applicable federal civil rights laws and [place] laws. We do not discriminate on the basis of race, color, national origin, age, disability, sex, sexual orientation, or gender identity.            Thank you!     Thank you for choosing [facility] AUDIOLOGY  for your care. Our goal is always to provide you with excellent care. Hearing back from our patients is one way we can continue to improve our services. Please take a few minutes to complete the written survey that you may receive in the mail after your visit with us. Thank you!             Your Updated Medication List - Protect others around you: Learn how to safely use, store and throw away your medicines at www.disposemymeds.org.          This list is accurate as of: 1/15/18  9:01 AM.  Always use your most recent med list.                   Brand Name Dispense Instructions for use Diagnosis    amLODIPine 10 MG tablet    NORVASC    90 tablet    Take 1 tablet (10 mg) by mouth daily AS DIRECTED     Essential hypertension, benign       anastrozole 1 MG tablet    ARIMIDEX    90 tablet    Take 1 tablet (1 mg) by mouth daily    Malignant neoplasm of left breast in female, estrogen receptor positive, unspecified site of breast (H)       atorvastatin 40 MG tablet    LIPITOR    90 tablet    Take 1 tablet (40 mg) by mouth daily Please put on profile- pt just received 90-day supply of cholesterol meds    Pure hypercholesterolemia       * cholecalciferol 1000 UNIT tablet    vitamin D3    100 tablet    Take 1 tablet (1,000 Units) by mouth daily    Osteoporosis       * cholecalciferol 5000 UNITS Caps capsule    vitamin D3    120 capsule    Take 1 capsule (5,000 Units) by mouth daily    Vaginal dryness       CoQ10 200 MG Caps      Take 1 capsule by mouth daily        diclofenac 1 % Gel topical gel    VOLTAREN    100 g    Apply 4 grams to knees or 2 grams to hands four times daily using enclosed dosing card.    Primary osteoarthritis of both wrists       gabapentin 300 MG capsule    NEURONTIN    540 capsule    1-2 tablets EVERY 8 HOURS    Neuropathic pain       HYDROcodone-acetaminophen 5-325 MG per tablet    NORCO    40 tablet    Take 1 tablet by mouth every 6 hours as needed    Chronic left-sided low back pain, with sciatica presence unspecified       ketoconazole 2 % shampoo    NIZORAL    120 mL    Apply topically daily as needed for itching or irritation    Dermatitis, seborrheic       levothyroxine 112 MCG tablet    SYNTHROID/LEVOTHROID    45 tablet    Take 0.5 tab daily    Hypothyroidism, unspecified type       LORazepam 2 MG tablet    ATIVAN    30 tablet    Take 1 tablet at night for sleep    Sleep disorder       metoprolol succinate 25 MG 24 hr tablet    TOPROL-XL    90 tablet    Take 1 tablet (25 mg) by mouth daily    Irregular heart rate       Multi-vitamin Tabs tablet   Generic drug:  multivitamin, therapeutic with minerals      Take 1 tablet by mouth daily.        OMEGA-3 FISH OIL PO      Take 1 capsule by mouth  daily        omeprazole 20 MG CR capsule    priLOSEC     Take 20 mg by mouth daily        ondansetron 4 MG ODT tab    ZOFRAN-ODT    90 tablet    1-2 tabs po prn every 6 hours    Nausea       REPHRESH Gel     14 Box    Place 2 g vaginally every 3 days    Urinary tract infection, site not specified       terbinafine 1 % cream    lamISIL AT    12 g    Apply topically 2 times daily For fungal infection not resolved with other antifungals (e.g. Clotrimazole)    Tinea pedis of both feet       triamcinolone 0.1 % ointment    KENALOG    30 g    Apply topically 2 times daily    Eczematous dermatitis       triamterene-hydrochlorothiazide 37.5-25 MG per tablet    MAXZIDE-25    90 tablet    Take 1 tablet by mouth daily    Essential hypertension, benign       * Notice:  This list has 2 medication(s) that are the same as other medications prescribed for you. Read the directions carefully, and ask your doctor or other care provider to review them with you.

## 2018-01-15 NOTE — PROGRESS NOTES
AUDIOLOGY REPORT    SUBJECTIVE: Nadira Perez is a 67 year old female was seen in the Audiology Clinic at  [facility] on 1/15/18 to discuss concerns with hearing and functional communication difficulties. Nadira has been seen previously on 11/27/2017, and results revealed a bilateral sensorineural hearing loss.  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  She reports difficulty hearing people at work if they are across the room, she stated that people sound as though they mumble.    OBJECTIVE:  Patient is a hearing aid candidate. Patient would like to move forward with a hearing aid evaluation today. Therefore, the patient was presented with different options for amplification to help aid in communication. Discussed styles, levels of technology and monaural vs. binaural fitting. She is very interested in technology that would allow the hearing aid to connect to her iPhone.    The hearing aid(s) mutually chosen were:  Binaural: Widex Beyond Fusion 330  COLOR: 70  BATTERY SIZE: 312  EARMOLD/TIPS: Open dome  CANAL/ LENGTH: 1 standard    Patient has a surgically repaired right pinna, it was discussed that if the hearing aid does not sit securely behind the year a custom earmold may be considered for retention purposes.  It was also discussed that she could possibly use toupee tape to connect the hearing aid to the glasses or ear.     ASSESSMENT:     Reviewed purchase information and warranty information with patient. The 45 day trial period was explained to patient. The patient was given a copy of the [place] Department of Health consumer brochure on purchasing hearing instruments. Patient risk factors have been provided to the patient in writing prior to the sale of the hearing aid per FDA regulation. The risk factors are also available in the User Instructional Booklet to  be presented on the day of the hearing aid fitting. Hearing aid(s) ordered. Hearing aid evaluation completed.    PLAN: Nadira is scheduled to return in 2-3 weeks for a hearing aid fitting and programming. Purchase agreement will be completed on that date. Please contact this clinic with any questions or concerns.      Rik Arauz  Audiologist  [place] License  #[id number]

## 2018-02-05 ENCOUNTER — OFFICE VISIT (OUTPATIENT)
Dept: AUDIOLOGY | Facility: CLINIC | Age: 68
End: 2018-02-05
Payer: COMMERCIAL

## 2018-02-05 DIAGNOSIS — H90.3 SENSORINEURAL HEARING LOSS, BILATERAL: Primary | ICD-10-CM

## 2018-02-05 NOTE — MR AVS SNAPSHOT
After Visit Summary   2/5/2018    Nadira Perez    MRN: [id number]           Patient Information     Date Of Birth          1950        Visit Information        Provider Department      2/5/2018 9:30 AM Leyda Keyes AuD M [facility] Audiology        Today's Diagnoses     Sensorineural hearing loss, bilateral    -  1       Follow-ups after your visit        Your next 10 appointments already scheduled     Feb 15, 2018  6:20 PM CST   (Arrive by 6:05 PM)   PRE-OP with MERCEDES Rivera [facility] ([facility])    [address] [place] [address]  4th [facility] [address]   [phone]            Feb 21, 2018  1:00 PM CST   Post-Op with Tyson Ruby MD   Eye Clinic ([facility])    [address]  [address]  [address] Clin [address] [place] [address]   [phone]            Feb 26, 2018  8:30 AM CST   (Arrive by 8:15 AM)   Initial Review Program with Rik Arauz [facility]iology ([facility])    [address] [place] [address]   [phone]            Feb 28, 2018  1:00 PM CST   Post-Op with Tyson Ruby MD   Eye Clinic ([facility])    [address]  [address]  [address] Clin [address] [place] [address]   [phone]            Mar 14, 2018  1:30 PM CDT   Post-Op with Tyson Ruby MD   Eye Clinic ([facility])    [address]  [address]  [address] Clin [address] [place] [address]   [phone]            Mar 21, 2018  1:00 PM CDT   Post-Op with Tyson Ruby MD   Eye Clinic ([facility])    [address]  [address]  [address] Clin [address] [place] [address]   [phone]            Apr 11, 2018  1:30 PM CDT   Post-Op with Tyson Ruby MD   Eye Clinic ([facility])    [address]  [address] [facility] [address] [address]  [address] Clin  9a  [facility] [address]   [phone]            May 14, 2018  9:30 AM CDT   [facility] Lab Draw with  [facility] LAB DRAW   [facility] Lab Draw ([facility])    [address]  [address]  [facility] [address]   [phone]            May 14, 2018 10:00 AM CDT   (Arrive by 9:45 AM)   Return Visit with Khushbu Louise MD   [facility] [place] [facility] ([facility])    [address]  [address]  [facility] [address] [phone]            May 14, 2018 10:30 AM CDT   Infusion 60 with UC ONCOLOGY INFUSION   [facility] [place] [facility] ([facility])    [address] [place] [address]  [address]  [facility] [address] [phone]              Who to contact     Please call your clinic at [phone] to:    Ask questions about your health    Make or cancel appointments    Discuss your medicines    Learn about your test results    Speak to your doctor   If you have compliments or concerns about an experience at your clinic, or if you wish to file a complaint, please contact [facility] Patient Relations at [phone] or email us at [email or web address]         Additional Information About Your Visit        Familinkhart Information     [employer] gives you secure access to your electronic health record. If you see a primary care provider, you can also send messages to your care team and make appointments. If you have questions, please call your primary care clinic.  If you do not have a primary care provider, please call [phone] and they will assist you.      [employer] is an electronic gateway that provides easy, online access to your medical records. With [employer] [address], you can request a clinic appointment, read your test results, renew a prescription or communicate with your care team.     To access your existing account, please contact your [facility] or  call [phone] for assistance.        Care EveryWhere ID     This is your Care EveryWhere ID. This could be used by other organizations to access your [facility] medical records  [id number]         Blood Pressure from Last 3 Encounters:   11/29/17 134/75   11/21/17 134/81   11/15/17 150/86    Weight from Last 3 Encounters:   12/21/17 95.3 kg (210 lb)   11/29/17 95.3 kg (210 lb)   11/21/17 95.3 kg (210 lb)              We Performed the Following     Hearing Aid Fitting        Primary Care Provider Office Phone # Fax #    Matilde Quiñonez, APRN -[phone]       [address] [place] [address] [address] [place] [address]        Equal Access to Services     EDEL LAMA : Hadii jacinta sousao Sosukhdev, waaxda luqadaha, qaybta kaalmada adeegyada, cierra noin hayjuan seals . So [facility] [phone].    ATENCIÓN: Si habla español, tiene a davis disposición servicios gratuitos de asistencia lingüística. Garrick al [phone].    We comply with applicable federal civil rights laws and [place] laws. We do not discriminate on the basis of race, color, national origin, age, disability, sex, sexual orientation, or gender identity.            Thank you!     Thank you for choosing [facility] AUDIOLOGY  for your care. Our goal is always to provide you with excellent care. Hearing back from our patients is one way we can continue to improve our services. Please take a few minutes to complete the written survey that you may receive in the mail after your visit with us. Thank you!             Your Updated Medication List - Protect others around you: Learn how to safely use, store and throw away your medicines at www.disposemymeds.org.          This list is accurate as of 2/5/18  6:33 PM.  Always use your most recent med list.                   Brand Name Dispense Instructions for use Diagnosis    amLODIPine 10 MG tablet    NORVASC    90 tablet    Take 1 tablet (10 mg) by mouth daily AS DIRECTED    Essential  hypertension, benign       anastrozole 1 MG tablet    ARIMIDEX    90 tablet    Take 1 tablet (1 mg) by mouth daily    Malignant neoplasm of left breast in female, estrogen receptor positive, unspecified site of breast (H)       atorvastatin 40 MG tablet    LIPITOR    90 tablet    Take 1 tablet (40 mg) by mouth daily Please put on profile- pt just received 90-day supply of cholesterol meds    Pure hypercholesterolemia       * cholecalciferol 1000 UNIT tablet    vitamin D3    100 tablet    Take 1 tablet (1,000 Units) by mouth daily    Osteoporosis       * cholecalciferol 5000 UNITS Caps capsule    vitamin D3    120 capsule    Take 1 capsule (5,000 Units) by mouth daily    Vaginal dryness       CoQ10 200 MG Caps      Take 1 capsule by mouth daily        diclofenac 1 % Gel topical gel    VOLTAREN    100 g    Apply 4 grams to knees or 2 grams to hands four times daily using enclosed dosing card.    Primary osteoarthritis of both wrists       gabapentin 300 MG capsule    NEURONTIN    540 capsule    1-2 tablets EVERY 8 HOURS    Neuropathic pain       HYDROcodone-acetaminophen 5-325 MG per tablet    NORCO    40 tablet    Take 1 tablet by mouth every 6 hours as needed    Chronic left-sided low back pain, with sciatica presence unspecified       ketoconazole 2 % shampoo    NIZORAL    120 mL    Apply topically daily as needed for itching or irritation    Dermatitis, seborrheic       levothyroxine 112 MCG tablet    SYNTHROID/LEVOTHROID    45 tablet    Take 0.5 tab daily    Hypothyroidism, unspecified type       LORazepam 2 MG tablet    ATIVAN    30 tablet    Take 1 tablet at night for sleep    Sleep disorder       metoprolol succinate 25 MG 24 hr tablet    TOPROL-XL    90 tablet    Take 1 tablet (25 mg) by mouth daily    Irregular heart rate       Multi-vitamin Tabs tablet   Generic drug:  multivitamin, therapeutic with minerals      Take 1 tablet by mouth daily.        OMEGA-3 FISH OIL PO      Take 1 capsule by mouth daily         omeprazole 20 MG CR capsule    priLOSEC     Take 20 mg by mouth daily        ondansetron 4 MG ODT tab    ZOFRAN-ODT    90 tablet    1-2 tabs po prn every 6 hours    Nausea       REPHRESH Gel     14 Box    Place 2 g vaginally every 3 days    Urinary tract infection, site not specified       terbinafine 1 % cream    lamISIL AT    12 g    Apply topically 2 times daily For fungal infection not resolved with other antifungals (e.g. Clotrimazole)    Tinea pedis of both feet       triamcinolone 0.1 % ointment    KENALOG    30 g    Apply topically 2 times daily    Eczematous dermatitis       triamterene-hydrochlorothiazide 37.5-25 MG per tablet    MAXZIDE-25    90 tablet    Take 1 tablet by mouth daily    Essential hypertension, benign       * Notice:  This list has 2 medication(s) that are the same as other medications prescribed for you. Read the directions carefully, and ask your doctor or other care provider to review them with you.

## 2018-02-05 NOTE — PROGRESS NOTES
AUDIOLOGY REPORT    SUBJECTIVE: Nadira Perez is a 67 year old female who was seen in the Audiology Clinic at the [facility] for a fitting of bilateral Widex Beyond Fusion 330 RITE hearing aids. Previous results have revealed a bilateral  sensorineural hearing loss.     OBJECTIVE: Otoscopy revealed non-occluding cerumen in both ears. A lighted curette was used to remove the cerumen from the left ear, the cerumen in the right ear was hardened and could not be removed by the provider in office today.  Nadira expressed that she has drops to soften the wax at home and would try that or have someone else remove it.   The hearing aid conformity evaluation was completed.The hearing aids were placed and they provided a good fit. Real-ear-probe-microphone measurements were completed on the Lumics system and were a good match to NAL-NL1 target with soft sounds audible, moderate sounds comfortable, and loud sounds below discomfort. UCLs are verified through maximum power output measures and demonstrate appropriate limiting of loud inputs. The right real-ear measures may not have been accurate due to the prove tip hitting the cerumen, test-box measures were also used to verify programming of the right hearing aid.   Nadira was oriented to proper hearing aid use, care, cleaning (no water, dry brush), batteries (size 312, insertion/removal, toxicity, low-battery signal), aid insertion/removal, user booklet, warranty information, storage cases, and other hearing aid details. The patient confirmed understanding of hearing aid use and care, and showed proper insertion of hearing aid and batteries while in the office today. Of note, the patient does have a surgical pinna on the right side and so the hearing aid does not sit as securely behind the right ear.  This was reviewed with Nadira who agreed to try and use toupee/double sided tape to help secure the hearing aid to the pinna.  Provider had the patient move her head around and the hearing aid did not move, suggesting that with the retention piece and dome in place, the hearing aid is secure on the ear.  It was reviewed that the toupee tape would just be an additional security measure.  She expressed understanding and said she would stop to get some after the appointment.    Nadira reported good volume and sound quality today.  Although things were initially a little loud so the hearing aids were set to 90% and will autoacclimatize to 100% over 3 weeks.  Hearing aids were programmed as follows:  Program 1:Universal  The push button is currently deactivated.    EAR(S) FIT: Binaural  HEARING AID MODEL NAME:  Widex Beyond Fusion 330  HEARING AID STYLE: RITE  EARMOLDS/TIP/ LINK: small open domes with standard receivers  SERIAL NUMBERS: Right: [id number]; Left: [id number]  WARRANTY END DATE: 2/16/2021    ASSESSMENT: Binaural hearing aids were fit today. Verification measures were performed. Nadira signed the Hearing Aid Purchase Agreement and was given a copy, as well as details on her hearing aids.    PLAN:Nadira will return for follow-up in 2-3 weeks for a hearing aid review appointment. Please call this clinic with questions regarding today s appointment.    Rik Arauz  Audiologist  [place] License  #[id number]

## 2018-02-15 ENCOUNTER — OFFICE VISIT (OUTPATIENT)
Dept: INTERNAL MEDICINE | Facility: CLINIC | Age: 68
End: 2018-02-15
Payer: COMMERCIAL

## 2018-02-15 VITALS
OXYGEN SATURATION: 96 % | BODY MASS INDEX: 33.13 KG/M2 | HEART RATE: 94 BPM | SYSTOLIC BLOOD PRESSURE: 134 MMHG | TEMPERATURE: 98.9 F | WEIGHT: 211.1 LBS | DIASTOLIC BLOOD PRESSURE: 85 MMHG | HEIGHT: 67 IN

## 2018-02-15 DIAGNOSIS — Z23 NEED FOR SHINGLES VACCINE: Primary | ICD-10-CM

## 2018-02-15 DIAGNOSIS — Z23 NEED FOR 23-POLYVALENT PNEUMOCOCCAL POLYSACCHARIDE VACCINE: ICD-10-CM

## 2018-02-15 DIAGNOSIS — Z01.818 PRE-OPERATIVE EXAMINATION: ICD-10-CM

## 2018-02-15 DIAGNOSIS — I10 ESSENTIAL HYPERTENSION, BENIGN: ICD-10-CM

## 2018-02-15 LAB
ANION GAP SERPL CALCULATED.3IONS-SCNC: 8 MMOL/L (ref 3–14)
BUN SERPL-MCNC: 15 MG/DL (ref 7–30)
CALCIUM SERPL-MCNC: 8.9 MG/DL (ref 8.5–10.1)
CHLORIDE SERPL-SCNC: 104 MMOL/L (ref 94–109)
CO2 SERPL-SCNC: 29 MMOL/L (ref 20–32)
CREAT SERPL-MCNC: 0.6 MG/DL (ref 0.52–1.04)
ERYTHROCYTE [DISTWIDTH] IN BLOOD BY AUTOMATED COUNT: 15.6 % (ref 10–15)
GFR SERPL CREATININE-BSD FRML MDRD: >90 ML/MIN/1.7M2
GLUCOSE SERPL-MCNC: 108 MG/DL (ref 70–99)
HCT VFR BLD AUTO: 38 % (ref 35–47)
HGB BLD-MCNC: 12.1 G/DL (ref 11.7–15.7)
MCH RBC QN AUTO: 27.6 PG (ref 26.5–33)
MCHC RBC AUTO-ENTMCNC: 31.8 G/DL (ref 31.5–36.5)
MCV RBC AUTO: 87 FL (ref 78–100)
PLATELET # BLD AUTO: 259 10E9/L (ref 150–450)
POTASSIUM SERPL-SCNC: 3.2 MMOL/L (ref 3.4–5.3)
RBC # BLD AUTO: 4.38 10E12/L (ref 3.8–5.2)
SODIUM SERPL-SCNC: 141 MMOL/L (ref 133–144)
WBC # BLD AUTO: 8.1 10E9/L (ref 4–11)

## 2018-02-15 RX ORDER — AMLODIPINE BESYLATE 10 MG/1
10 TABLET ORAL AT BEDTIME
Qty: 90 TABLET | Refills: 3 | Status: SHIPPED | OUTPATIENT
Start: 2018-02-15 | End: 2018-02-15

## 2018-02-15 RX ORDER — AMLODIPINE BESYLATE 5 MG/1
5 TABLET ORAL DAILY
Qty: 90 TABLET | Refills: 3 | Status: SHIPPED | OUTPATIENT
Start: 2018-02-15 | End: 2018-02-15

## 2018-02-15 RX ORDER — AMLODIPINE BESYLATE 5 MG/1
5 TABLET ORAL AT BEDTIME
Qty: 90 TABLET | Refills: 3 | Status: SHIPPED | OUTPATIENT
Start: 2018-02-15 | End: 2018-10-18

## 2018-02-15 RX ORDER — LOSARTAN POTASSIUM 25 MG/1
25 TABLET ORAL DAILY
Qty: 90 TABLET | Refills: 3 | Status: SHIPPED | OUTPATIENT
Start: 2018-02-15 | End: 2018-07-24 | Stop reason: ALTCHOICE

## 2018-02-15 ASSESSMENT — ENCOUNTER SYMPTOMS
BLOATING: 1
LIGHT-HEADEDNESS: 0
MUSCLE WEAKNESS: 0
NECK PAIN: 1
CONSTIPATION: 0
HEARTBURN: 1
EYE IRRITATION: 0
SINUS CONGESTION: 0
VOMITING: 0
BOWEL INCONTINENCE: 0
EYE WATERING: 0
DIARRHEA: 1
SINUS PAIN: 0
NUMBNESS: 1
HOARSE VOICE: 0
MYALGIAS: 1
ORTHOPNEA: 0
MUSCLE CRAMPS: 1
SLEEP DISTURBANCES DUE TO BREATHING: 0
EXERCISE INTOLERANCE: 0
HEMATURIA: 0
WEAKNESS: 0
DYSURIA: 0
HYPOTENSION: 0
DECREASED LIBIDO: 1
PARALYSIS: 0
JAUNDICE: 0
FLANK PAIN: 0
ARTHRALGIAS: 1
JOINT SWELLING: 0
DIZZINESS: 1
NAUSEA: 1
SPEECH CHANGE: 0
DIFFICULTY URINATING: 0
EYE PAIN: 0
SYNCOPE: 0
LEG PAIN: 0
TASTE DISTURBANCE: 0
SORE THROAT: 0
BLOOD IN STOOL: 0
HOT FLASHES: 0
STIFFNESS: 1
NECK MASS: 0
TREMORS: 0
HYPERTENSION: 1
ABDOMINAL PAIN: 0
BACK PAIN: 1
HEADACHES: 0
DOUBLE VISION: 0
PALPITATIONS: 1
DISTURBANCES IN COORDINATION: 0
MEMORY LOSS: 0
SEIZURES: 0
TINGLING: 1
RECTAL PAIN: 0
LOSS OF CONSCIOUSNESS: 0
EYE REDNESS: 0
TROUBLE SWALLOWING: 0
SMELL DISTURBANCE: 0

## 2018-02-15 ASSESSMENT — ACTIVITIES OF DAILY LIVING (ADL)
IN_THE_PAST_7_DAYS,_DID_YOU_NEED_HELP_FROM_OTHERS_TO_TAKE_CARE_OF_THINGS_SUCH_AS_LAUNDRY_AND_HOUSEKEEPING,_BANKING,_SHOPPING,_USING_THE_TELEPHONE,_FOOD_PREPARATION,_TRANSPORTATION,_OR_TAKING_YOUR_OWN_MEDICATIONS?: Y
IN_THE_PAST_7_DAYS,_DID_YOU_NEED_HELP_FROM_OTHERS_TO_PERFORM_EVERYDAY_ACTIVITIES_SUCH_AS_EATING,_GETTING_DRESSED,_GROOMING,_BATHING,_WALKING,_OR_USING_THE_TOILET: N

## 2018-02-15 ASSESSMENT — PAIN SCALES - GENERAL: PAINLEVEL: MODERATE PAIN (4)

## 2018-02-15 NOTE — LETTER
2/15/2018      RE: Nadira Perez  [address]  [facility] [address]       Surgeon (please enter first and last name):  Dr Tyson Ruby  Fax number for Preop Evaluation:  [phone]  Location of Surgery: [facility]   Date of Surgery:  2.20.18 and 3.13.18  Procedure:  Left eye cataract and right eye cataract   History of reaction to anesthesia?  No      Nadira Perez is 67 year old female here at the request of Dr. Ruby  for cardiovascular, pulmonary, and perioperative risk assessment prior to surgery. The intended surgical procedure is left cataract removal and IOLP on 2/20/2018.  She is scheduled for her right eye cataract extraction on 3/13/18.  A copy of this note will be sent to the surgeon.    PROBLEM LIST:   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     Corneal opacity     Hypothyroidism     Osteopenia, unspecified location     Aromatase inhibitor use       PAST SURGICAL HX:   Past Surgical History:   Procedure Laterality Date     ABDOMEN SURGERY      ovarian cyst, mesh     ARTHRODESIS WRIST       ARTHRODESIS WRIST  2/14/2013    Procedure: ARTHRODESIS WRIST;  left wrist scaphoid excision, four bone fusion, iliac crest bone graft  ( Mac with block);  Surgeon: Av Mendez MD;  [facility]     BIOPSY      skin, vaginal     BIOPSY OF SKIN LESION       COLONOSCOPY  12/24/2013    Procedure: COMBINED COLONOSCOPY, SINGLE BIOPSY/POLYPECTOMY BY BIOPSY;  COLONOSCOPY;  Surgeon:  Dom Alvarez MD;  Location:  GI     COSMETIC SURGERY       ESOPHAGOSCOPY, GASTROSCOPY, DUODENOSCOPY (EGD), COMBINED N/A 11/23/2016    Procedure: COMBINED ESOPHAGOSCOPY, GASTROSCOPY, DUODENOSCOPY (EGD);  Surgeon: Quinten Feliciano MD;  Location:  GI     EXTERNAL EAR SURGERY      right     EYE SURGERY      radial keratomy     GRAFT BONE FROM ILIAC CREST  2/14/2013    Procedure: GRAFT BONE FROM ILIAC CREST;  mac with block and local infilitration;  Surgeon: Av Mendez MD;  [facility]      BREATH HYDROGEN TEST N/A 10/14/2016    Procedure: HYDROGEN BREATH TEST;  Surgeon: Cheri Barron MD;  Location:  GI     HERNIA REPAIR      UMBILICAL     HERNIA REPAIR      umbilical age 18 mos.     HERNIA REPAIR       HERNIORRHAPHY UMBILICAL  [birth year]31/1954     HYSTERECTOMY TOTAL ABDOMINAL  5/3/00     HYSTERECTOMY TOTAL ABDOMINAL  5/3/2000     MASTECTOMY      PROPHYLACTIC RIGHT BREAST     MASTECTOMY MODIFIED RADICAL      LEFT BREAST     MASTECTOMY MODIFIED RADICAL      bilateral; right breast prophylactic     PARATHYROIDECTOMY  9/23/04    R SUPERIOR     PARATHYROIDECTOMY  9/23/2004    parathyroid resection, subtotal     REMOVE HARDWARE HAND  9/24/2013    Procedure: REMOVE HARDWARE HAND;  Left Hand Screw Removal        RHINOPLASTY  12/31/1985     RHINOPLASTY  12/31/1985     thyr proc skin closed cosmetic manner by subcuticular stitch  1/23/2009     TONSILLECTOMY  3/1/68     TONSILLECTOMY  3/1/1968     WRIST SURGERY      wrist arthrodesis       FAMILY MEDICAL HX:   Family History   Problem Relation Age of Onset     HEART DISEASE Father      AAA     Hypertension Father      Neurologic Disorder Mother      Anuerysm of Cerebral Artery, Dementia     DIABETES Mother      Thyroid Disease Mother      ,     CEREBROVASCULAR DISEASE Mother      Dementia Mother      OSTEOPOROSIS Mother      DIABETES Maternal Grandmother      Asthma Maternal Grandmother      DIABETES Maternal Aunt      x2     Melanoma Maternal  Aunt      Circulatory Brother      Perihperal Neurophathy     Dementia Other      CANCER Other      malignant melanoma     Hypertension Other      Hypertension Other      CEREBROVASCULAR DISEASE Other      CEREBROVASCULAR DISEASE Other      Obesity Other      Respiratory Other      Chronic Obstructive Pulmonary Disease Maternal Grandfather      father     Asthma Maternal Grandfather      Breast Cancer Cousin      CANCER Other      DIABETES Other      Asthma Other      Glaucoma No family hx of      Macular Degeneration No family hx of      Coronary Artery Disease No family hx of      Hyperlipidemia No family hx of      KIDNEY DISEASE No family hx of      Thrombosis No family hx of      Arthritis No family hx of      Depression No family hx of      MENTAL ILLNESS No family hx of      Substance Abuse No family hx of      Cystic Fibrosis No family hx of      Early Death No family hx of      Coronary Artery Disease Early Onset No family hx of      Heart Failure No family hx of      Bleeding Diathesis No family hx of      Ovarian Cancer No family hx of      Uterine Cancer No family hx of      Prostate Cancer No family hx of      Colorectal Cancer No family hx of      Pancreatic Cancer No family hx of      Lung Cancer No family hx of      Other Cancer No family hx of      Autoimmune Disease No family hx of      Unknown/Adopted No family hx of      Genetic Disorder No family hx of        IMMUNIZATION HX:   Immunization History   Administered Date(s) Administered     HEPA 04/26/2005, 10/26/2005     HepB 04/26/2005, 06/02/2005, 10/26/2005     Influenza (High Dose) 3 valent vaccine 10/06/2017     Influenza (IIV3) PF 10/01/2008, 09/29/2009, 10/01/2011, 10/02/2012, 10/09/2013, 10/01/2014     Influenza Intranasal Vaccine 4 valent 10/02/2015     Influenza Vaccine IM 3yrs+ 4 Valent IIV4 10/13/2016     Pneumo Conj 13-V (2010&after) 08/01/2017     Pneumococcal 23 valent 10/01/2008     TD (ADULT, 7+) 01/10/2000, 04/26/2005     TDAP  Vaccine (Boostrix) 11/06/2012     Typhoid IM 04/26/2005     Zoster vaccine, live 11/14/2012       MEDICATIONS:   Current Outpatient Prescriptions   Medication Sig Dispense Refill     levothyroxine (SYNTHROID/LEVOTHROID) 112 MCG tablet Take 0.5 tab daily 45 tablet 3     HYDROcodone-acetaminophen (NORCO) 5-325 MG per tablet Take 1 tablet by mouth every 6 hours as needed 40 tablet 0     anastrozole (ARIMIDEX) 1 MG tablet Take 1 tablet (1 mg) by mouth daily 90 tablet 3     triamterene-hydrochlorothiazide (MAXZIDE-25) 37.5-25 MG per tablet Take 1 tablet by mouth daily 90 tablet 3     amLODIPine (NORVASC) 10 MG tablet Take 1 tablet (10 mg) by mouth daily AS DIRECTED 90 tablet 3     atorvastatin (LIPITOR) 40 MG tablet Take 1 tablet (40 mg) by mouth daily Please put on profile- pt just received 90-day supply of cholesterol meds 90 tablet 3     gabapentin (NEURONTIN) 300 MG capsule 1-2 tablets EVERY 8 HOURS 540 capsule 3     metoprolol (TOPROL-XL) 25 MG 24 hr tablet Take 1 tablet (25 mg) by mouth daily 90 tablet 3     Coenzyme Q10 (COQ10) 200 MG CAPS Take 1 capsule by mouth daily       omeprazole (PRILOSEC) 20 MG CR capsule Take 20 mg by mouth daily       ketoconazole (NIZORAL) 2 % shampoo Apply topically daily as needed for itching or irritation 120 mL 11     terbinafine (LAMISIL AT) 1 % cream Apply topically 2 times daily For fungal infection not resolved with other antifungals (e.g. Clotrimazole) 12 g 1     ondansetron (ZOFRAN-ODT) 4 MG ODT tab 1-2 tabs po prn every 6 hours 90 tablet 2     diclofenac (VOLTAREN) 1 % GEL topical gel Apply 4 grams to knees or 2 grams to hands four times daily using enclosed dosing card. 100 g 1     Omega-3 Fatty Acids (OMEGA-3 FISH OIL PO) Take 1 capsule by mouth daily        LORazepam (ATIVAN) 2 MG tablet Take 1 tablet at night for sleep 30 tablet 1     cholecalciferol (VITAMIN D) 1000 UNIT tablet Take 1 tablet (1,000 Units) by mouth daily 100 tablet 3     cholecalciferol (VITAMIN D3) 5000  UNITS CAPS capsule Take 1 capsule (5,000 Units) by mouth daily 120 capsule 3     triamcinolone (KENALOG) 0.1 % ointment Apply topically 2 times daily 30 g 1     Vaginal Lubricant (REPHRESH) GEL Place 2 g vaginally every 3 days 14 Box 3     Multiple Vitamin (MULTI-VITAMIN) per tablet Take 1 tablet by mouth daily.         SOCIAL HX:   Social History     Social History     Marital status:      Spouse name: N/A     Number of children: N/A     Years of education: N/A     Social History Main Topics     Smoking status: Never Smoker     Smokeless tobacco: Never Used     Alcohol use Yes      Comment: Rare to occasional     Drug use: No     Sexual activity: Not Currently     Partners: Male     Birth control/ protection: Abstinence     Other Topics Concern      Service No     Caffeine Concern No     Occupational Exposure No     Hobby Hazards No     Sleep Concern No     Stress Concern No     Weight Concern No     Special Diet No     Back Care No     Exercise Yes     walks 4-6x  week for 20-30 min. each     Seat Belt Yes     Self-Exams Yes     Parent/Sibling W/ Cabg, Mi Or Angioplasty Before 65f 55m? No     Social History Narrative    .  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       This is a LOW risk surgery.    HPI:   Reason for surgery: She has had increasingly poor vision OS >OD. She has glare from lights at night and has difficulty reading small print.  No flashes or floaters.    Cardiovascular Risk:  This patient ambulates without assist.  without   chest pain. She IS able to climb a flight of stairs without chest pain.    The patient does not  have chest pain Exercise.    She does  have a history of hypertension and high cholesterol.   The patient does not  have a history of stroke, and does not  have a history of valvular disease.    Pulmonary Risk:  In terms of risk factors for pulmonary complications, the patient does not  have a history of  "Asthma    Pulmonary Risk:  In terms of risk factors for pulmonary complications, the patient does not have a history of CHF, COPD, age >60 years, being functionally dependent.       Is this patient going to undergo any of the following procedures that would put him at higher risk of postoperative pulmonary complications:  Prolonged surgery (>3 hours): No  Abdominal surgery/thoracic surgery/neurosurgery/head and neck: No  Vascular/aortic aneurysm repair: No  General anesthesia: No    Perioperative Complications:  The patient does not  have a history of bleeding or clotting problems in the past. The patient has not  had complications from past surgeries.  The patient does not  have a family history of any anesthesia or surgical complications.      ROS:  Constitutional: no fevers, night sweats or unintentional weight change   Eyes: see chief complaint.  Ears, Nose, Mouth, Throat: no tinnitus or hearing change, she has hearing aides, cerumen in right ear canal,  no epistaxis or nasal discharge, no oral lesions, throat clear   Cardiovascular: no chest pain, palpitations, or pain with walking, no orthopnea or PND   Respiratory: no dyspnea, cough, shortness of breath or wheezing   GI: no new GI symptoms.   : no new Urologic symptoms  Musculoskeletal: no joint or muscle pain or swelling   Integumentary: no concerning lesions or moles   Neuro: no loss of strength or sensation, no numbness or tingling, no tremor, no dizziness, no headache   Endo: no polyuria or polydipsia, no temperature intolerance   Heme/Lymph: no concerning bumps, no bleeding problems   Allergy: no environmental allergies   Psych: no depression or anxiety, no sleep problems      PHYSICAL EXAM:  /85  Pulse 94  Temp 98.9  F (37.2  C) (Oral)  Ht 1.702 m (5' 7\")  Wt 95.8 kg (211 lb 1.6 oz)  SpO2 96%  BMI 33.06 kg/m2    Wt Readings from Last 1 Encounters:   02/15/18 95.8 kg (211 lb 1.6 oz)     Constitutional: no distress, comfortable, pleasant "   Eyes: anicteric. Glasses.  Ears, Nose and Throat: tympanic membranes clear on left and cerumen present in the right,,  throat clear, neck supple with full range of motion, no thyromegaly.   Cardiovascular: regular rate and rhythm, normal S1 and S2, 1/6 BRAD RUSB,  no rubs or gallops, peripheral pulses full and symmetric. 1 + pitting edema bilaterally.   Respiratory: clear to auscultation, no wheezes or crackles, normal breath sounds   Gastrointestinal: positive bowel sounds, nontender, no hepatosplenomegaly, no masses   Musculoskeletal: full range of motion.  Skin: no concerning lesions, no jaundice   Neurological: normal gait, no tremor   Psychological: appropriate mood   Lymphatic: no cervical, supra or infra clavicular lymphadenopathy      A/P:    The patient with   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     Corneal opacity     Hypothyroidism     Osteopenia, unspecified location     Aromatase inhibitor use    presents prior to surgery for assessment of perioperative risk. The patient is at LOW   risk for cardiovascular complications and at LOW   risk for pulmonary complications of this LOW   risk surgery.      --Approval given to proceed with proposed procedure, without further diagnostic evaluation   This procedure will be done under local anesthesia.     No evidence of functionally compromising cardiac or pulmonary status  Clear for anesthesia and surgery  The patient is recommended to hold aspirin or NSAIDS  for 10 days prior to surgery.    Pre-Op Plan:   Proceed with surgery as planned.  No food for 8 hours before surgery.  No liquid for 2 hours before surgery.   Call surgeon  If you develop a fever, respiratory illness or other symptoms.  Hold the following medications the morning of  Surgery:  Take the following medications the morning of surgery with a sip of water:    Letter sent to requesting surgeon  listed above  Written pre-operative instructions given.      Need for shingles vaccine  -     ZOSTER VACCINE RECOMBINANT ADJUVANTED IM NJX (SHINGRIX)  -     ZOSTER VACCINE RECOMBINANT ADJUVANTED IM NJX (SHINGRIX); Future    Pre-operative examination  -     CBC with platelets; Future  -     Basic metabolic panel; Future    Essential hypertension, benign  -     Discontinue: amLODIPine (NORVASC) 10 MG tablet; Take 1 tablet (10 mg) by mouth At Bedtime AS DIRECTED  -     Discontinue: amLODIPine (NORVASC) 5 MG tablet; Take 1 tablet (5 mg) by mouth daily  -     losartan (COZAAR) 25 MG tablet; Take 1 tablet (25 mg) by mouth daily  -     amLODIPine (NORVASC) 5 MG tablet; Take 1 tablet (5 mg) by mouth At Bedtime    Need for 23-polyvalent pneumococcal polysaccharide vaccine  -     Pneumococcal vaccine 23 valent PPSV23  (Pneumovax) [42875]; Future      Laboratory studies:  CBC and BMP  Results for DWAYNE STILES (MRN [id number]) as of 2/16/2018 14:56   Ref. Range 2/15/2018 19:48   Sodium Latest Ref Range: 133 - 144 mmol/L 141   Potassium Latest Ref Range: 3.4 - 5.3 mmol/L 3.2 (L)   Chloride Latest Ref Range: 94 - 109 mmol/L 104   Carbon Dioxide Latest Ref Range: 20 - 32 mmol/L 29   Urea Nitrogen Latest Ref Range: 7 - 30 mg/dL 15   Creatinine Latest Ref Range: 0.52 - 1.04 mg/dL 0.60   GFR Estimate Latest Ref Range: >60 mL/min/1.7m2 >90   GFR Estimate If Black Latest Ref Range: >60 mL/min/1.7m2 >90   Calcium Latest Ref Range: 8.5 - 10.1 mg/dL 8.9   Anion Gap Latest Ref Range: 3 - 14 mmol/L 8     Results for DWAYNE STILES  L (MRN [id number]) as of 2/16/2018 14:56   Ref. Range 2/15/2018 19:48   Glucose Latest Ref Range: 70 - 99 mg/dL 108 (H)   WBC Latest Ref Range: 4.0 - 11.0 10e9/L 8.1   Hemoglobin Latest Ref Range: 11.7 - 15.7 g/dL 12.1   Hematocrit Latest Ref Range: 35.0 - 47.0 % 38.0   Platelet Count Latest Ref Range: 150 - 450 10e9/L 259   RBC Count Latest Ref Range: 3.8 - 5.2 10e12/L 4.38   MCV Latest Ref Range: 78 - 100 fl 87   MCH Latest Ref Range: 26.5 - 33.0 pg 27.6   MCHC Latest Ref Range: 31.5 - 36.5 g/dL 31.8   RDW Latest Ref Range: 10.0 - 15.0 % 15.6 (H)       Pregnancy testing was not indicated.    Cardiovascular: EKG was not indicated based on risk assessment.       Total time spent 40 minutes.  More than 50% of the time spent with Ms. Perez on counseling / coordinating her care      Please contact our office if there are any further questions or information required about this patient.      Matilde LONG CNP    Answers for HPI/ROS submitted by the patient on 2/15/2018   General Symptoms: No  Skin Symptoms: No  HENT Symptoms: Yes  EYE SYMPTOMS: Yes  HEART SYMPTOMS: Yes  LUNG SYMPTOMS: No  INTESTINAL SYMPTOMS: Yes  URINARY SYMPTOMS: Yes  GYNECOLOGIC SYMPTOMS: Yes  BREAST SYMPTOMS: No  SKELETAL SYMPTOMS: Yes  BLOOD SYMPTOMS: No  NERVOUS SYSTEM SYMPTOMS: Yes  MENTAL HEALTH SYMPTOMS: No  Ear pain: No  Ear discharge: No  Hearing loss: Yes  Tinnitus: Yes  Nosebleeds: No  Congestion: No  Sinus pain: No  Trouble swallowing: No   Voice hoarseness: No  Mouth sores: No  Sore throat: No  Tooth pain: No  Gum tenderness: No  Bleeding gums: No  Change in taste: No  Change in sense of smell: No  Dry mouth: Yes  Hearing aid used: Yes  Neck lump: No  Eye pain: No  Vision loss: Yes  Dry eyes: Yes  Watery eyes: No  Eye bulging: No  Double vision: No  Flashing of lights: No  Spots: No  Floaters: Yes  Redness: No  Crossed eyes: No  Tunnel Vision: No  Yellowing of eyes: No  Eye irritation: No  Chest pain or pressure: No  Fast or  irregular heartbeat: Yes  Pain in legs with walking: No  Trouble breathing while lying down: No  Fingers or toes appear blue: No  High blood pressure: Yes  Low blood pressure: No  Fainting: No  Murmurs: No  Pacemaker: No  Varicose veins: No  Edema or swelling: Yes  Wake up at night with shortness of breath: No  Light-headedness: No  Exercise intolerance: No  Heart burn or indigestion: Yes  Nausea: Yes  Vomiting: No  Abdominal pain: No  Bloating: Yes  Constipation: No  Diarrhea: Yes  Blood in stool: No  Black stools: No  Rectal or Anal pain: No  Fecal incontinence: No  Yellowing of skin or eyes: No  Vomit with blood: No  Trouble holding urine or incontinence: Yes  Pain or burning: No  Trouble starting or stopping: Yes  Increased frequency of urination: Yes  Blood in urine: No  Decreased frequency of urination: No  Frequent nighttime urination: No  Flank pain: No  Difficulty emptying bladder: No  Back pain: Yes  Muscle aches: Yes  Neck pain: Yes  Swollen joints: No  Joint pain: Yes  Bone pain: Yes  Muscle cramps: Yes  Muscle weakness: No  Joint stiffness: Yes  Bone fracture: No  Trouble with coordination: No  Dizziness or trouble with balance: Yes  Fainting or black-out spells: No  Memory loss: No  Headache: No  Seizures: No  Speech problems: No  Tingling: Yes  Tremor: No  Weakness: No  Difficulty walking: Yes  Paralysis: No  Numbness: Yes  Bleeding or spotting between periods: No  Heavy or painful periods: No  Irregular periods: No  Vaginal discharge: No  Hot flashes: No  Vaginal dryness: Yes  Genital ulcers: No  Reduced libido: Yes  Painful intercourse: Yes  Difficulty with sexual arousal: Yes  Post-menopausal bleeding: No      MERCEDES Kyle CNP

## 2018-02-15 NOTE — MR AVS SNAPSHOT
After Visit Summary   2/15/2018    Nadira Perez    MRN: [id number]           Patient Information     Date Of Birth          1950        Visit Information        Provider Department      2/15/2018 6:20 PM Matilde Quiñonez, [facility]        Today's Diagnoses     Need for shingles vaccine    -  1    Pre-operative examination        Essential hypertension, benign        Need for 23-polyvalent pneumococcal polysaccharide vaccine           Follow-ups after your visit        Your next 10 appointments already scheduled     Feb 21, 2018  1:00 PM CST   Post-Op with Tyson Ruby MD   Eye Clinic ([facility])    [facility]  [address]   [address] [address]  [facility] [address]   [phone]            Feb 26, 2018  8:30 AM CST   (Arrive by 8:15 AM)   Initial Review Program with Rik Arauz [facility] ([facility])    [address]  4th Floor  [facility] [address]   [phone]            Feb 28, 2018  1:00 PM CST   Post-Op with Tyson Ruby MD   Eye Clinic ([facility])    [facility]  [address]   [address] Clin 9a  [facility] [address]   [phone]            Mar 14, 2018  1:30 PM CDT   Post-Op with Tyson Ruby MD   Eye Clinic ([facility])    [facility]  [address]   [address] Clin 9a  [facility] [address]   [phone]            Mar 21, 2018  1:00 PM CDT   Post-Op with Tyson Ruby MD   Eye Clinic ([facility])    [facility]  [address]  [address] Clin 9a  [facility] [address]   [phone]            Apr 11, 2018  1:30 PM CDT   Post-Op with Tyson Ruby MD   Eye Clinic ([facility])    [facility]  [address] [place] [address] [address]  9th [address] Clin 9a  [facility] [address]   [phone]            May 14, 2018  9:30 AM CDT   [facility]   [facility] Lab Draw ([facility])    [address]  [address]  [facility] [id number] [phone]            May 14, 2018 10:00 AM CDT   (Arrive by 9:45 AM)   Return Visit with Khushbu Louise MD   [facility] ([facility])    [address]  [address]  [facility] [id number] [phone]            May 14, 2018 10:30 AM CDT   Infusion 60 with [facility]Y INFUSION, UC 31 ATC   [facility] ([facility])    [address]  [address]  [facility] [address] [phone]              Future tests that were ordered for you today     Open Future Orders        Priority Expected Expires Ordered    Basic metabolic panel Routine 2/15/2018 2/15/2019 2/15/2018    Pneumococcal vaccine 23 valent PPSV23  (Pneumovax) [36584] Routine 8/1/2018 2/15/2019 2/15/2018    ZOSTER VACCINE RECOMBINANT ADJUVANTED IM NJX (SHINGRIX) Routine 5/1/2018 2/15/2019 2/15/2018    CBC with platelets Routine 2/15/2018 3/1/2018 2/15/2018            Who to contact     Please call your clinic at [phone] to:    Ask questions about your health    Make or cancel appointments    Discuss your medicines    Learn about your test results    Speak to your doctor            Additional Information About Your Visit        [employer] Information     [employer] gives you secure access to your electronic health record. If you see a primary care provider, you can also send messages to your care team and make appointments. If you have questions, please call your primary care clinic.  If you do not have a primary care provider, please call [phone] and they will assist you.      [employer] is an electronic gateway that provides easy, online access to your medical records. With [employer], you can request a clinic appointment, read your test results, renew a prescription or communicate with  "your care team.     To access your existing account, please contact your [facility] or call [phone] for assistance.        Care EveryWhere ID     This is your Care EveryWhere ID. This could be used by other organizations to access your Man medical records  [id number]        Your Vitals Were     Pulse Temperature Height Pulse Oximetry BMI (Body Mass Index)       94 98.9  F (37.2  C) (Oral) 1.702 m (5' 7\") 96% 33.06 kg/m2        Blood Pressure from Last 3 Encounters:   02/15/18 134/85   11/29/17 134/75   11/21/17 134/81    Weight from Last 3 Encounters:   02/15/18 95.8 kg (211 lb 1.6 oz)   12/21/17 95.3 kg (210 lb)   11/29/17 95.3 kg (210 lb)              We Performed the Following     ZOSTER VACCINE RECOMBINANT ADJUVANTED IM NJX (SHINGRIX)          Today's Medication Changes          These changes are accurate as of 2/15/18  7:31 PM.  If you have any questions, ask your nurse or doctor.               Start taking these medicines.        Dose/Directions    losartan 25 MG tablet   Commonly known as:  COZAAR   Used for:  Essential hypertension, benign        Dose:  25 mg   Take 1 tablet (25 mg) by mouth daily   Quantity:  90 tablet   Refills:  3         These medicines have changed or have updated prescriptions.        Dose/Directions    amLODIPine 5 MG tablet   Commonly known as:  NORVASC   This may have changed:    - medication strength  - how much to take  - when to take this  - additional instructions   Used for:  Essential hypertension, benign        Dose:  5 mg   Take 1 tablet (5 mg) by mouth At Bedtime   Quantity:  90 tablet   Refills:  3            Where to get your medicines      These medications were sent to [facility] [address] [facility] - [place], [place] - [address]  [facility]  [address]  [address] , [facility] [address]     Phone:  [phone]     amLODIPine 5 MG tablet    losartan 25 MG tablet                Primary Care Provider Office Phone # Fax #    Matilde Quiñonez, " APRN -[phone]       [address] [place] [address]  [facility] [address]        Equal Access to Services     EDEL LAMA : Hadii aad ku hadchelsiedottie Mikeali, lynda tyronejuan joseha, anthony arnoldodarlyn ambriz, cierra oliver laShereejuan daugherty. So [facility] [phone].    ATENCIÓN: Si habla español, tiene a davis disposición servicios gratuitos de asistencia lingüística. Llame al [phone].    We comply with applicable federal civil rights laws and [place] laws. We do not discriminate on the basis of race, color, national origin, age, disability, sex, sexual orientation, or gender identity.            Thank you!     Thank you for choosing [facility]  for your care. Our goal is always to provide you with excellent care. Hearing back from our patients is one way we can continue to improve our services. Please take a few minutes to complete the written survey that you may receive in the mail after your visit with us. Thank you!             Your Updated Medication List - Protect others around you: Learn how to safely use, store and throw away your medicines at www.disposemymeds.org.          This list is accurate as of 2/15/18  7:31 PM.  Always use your most recent med list.                   Brand Name Dispense Instructions for use Diagnosis    amLODIPine 5 MG tablet    NORVASC    90 tablet    Take 1 tablet (5 mg) by mouth At Bedtime    Essential hypertension, benign       anastrozole 1 MG tablet    ARIMIDEX    90 tablet    Take 1 tablet (1 mg) by mouth daily    Malignant neoplasm of left breast in female, estrogen receptor positive, unspecified site of breast (H)       atorvastatin 40 MG tablet    LIPITOR    90 tablet    Take 1 tablet (40 mg) by mouth daily Please put on profile- pt just received 90-day supply of cholesterol meds    Pure hypercholesterolemia       * cholecalciferol 1000 UNIT tablet    vitamin D3    100 tablet    Take 1 tablet (1,000 Units) by mouth daily    Osteoporosis        * cholecalciferol 5000 UNITS Caps capsule    vitamin D3    120 capsule    Take 1 capsule (5,000 Units) by mouth daily    Vaginal dryness       CoQ10 200 MG Caps      Take 1 capsule by mouth daily        diclofenac 1 % Gel topical gel    VOLTAREN    100 g    Apply 4 grams to knees or 2 grams to hands four times daily using enclosed dosing card.    Primary osteoarthritis of both wrists       gabapentin 300 MG capsule    NEURONTIN    540 capsule    1-2 tablets EVERY 8 HOURS    Neuropathic pain       HYDROcodone-acetaminophen 5-325 MG per tablet    NORCO    40 tablet    Take 1 tablet by mouth every 6 hours as needed    Chronic left-sided low back pain, with sciatica presence unspecified       ketoconazole 2 % shampoo    NIZORAL    120 mL    Apply topically daily as needed for itching or irritation    Dermatitis, seborrheic       levothyroxine 112 MCG tablet    SYNTHROID/LEVOTHROID    45 tablet    Take 0.5 tab daily    Hypothyroidism, unspecified type       LORazepam 2 MG tablet    ATIVAN    30 tablet    Take 1 tablet at night for sleep    Sleep disorder       losartan 25 MG tablet    COZAAR    90 tablet    Take 1 tablet (25 mg) by mouth daily    Essential hypertension, benign       metoprolol succinate 25 MG 24 hr tablet    TOPROL-XL    90 tablet    Take 1 tablet (25 mg) by mouth daily    Irregular heart rate       Multi-vitamin Tabs tablet   Generic drug:  multivitamin, therapeutic with minerals      Take 1 tablet by mouth daily.        OMEGA-3 FISH OIL PO      Take 1 capsule by mouth daily        omeprazole 20 MG CR capsule    priLOSEC     Take 20 mg by mouth daily        ondansetron 4 MG ODT tab    ZOFRAN-ODT    90 tablet    1-2 tabs po prn every 6 hours    Nausea       REPHRESH Gel     14 Box    Place 2 g vaginally every 3 days    Urinary tract infection, site not specified       terbinafine 1 % cream    lamISIL AT    12 g    Apply topically 2 times daily For fungal infection not resolved with other antifungals  (e.g. Clotrimazole)    Tinea pedis of both feet       triamcinolone 0.1 % ointment    KENALOG    30 g    Apply topically 2 times daily    Eczematous dermatitis       triamterene-hydrochlorothiazide 37.5-25 MG per tablet    MAXZIDE-25    90 tablet    Take 1 tablet by mouth daily    Essential hypertension, benign       * Notice:  This list has 2 medication(s) that are the same as other medications prescribed for you. Read the directions carefully, and ask your doctor or other care provider to review them with you.

## 2018-02-16 NOTE — PROGRESS NOTES
Nadira Perez is 67 year old female here at the request of Dr. Ruby  for cardiovascular, pulmonary, and perioperative risk assessment prior to surgery. The intended surgical procedure is left cataract removal and IOLP on 2/20/2018.  She is scheduled for her right eye cataract extraction on 3/13/18.  A copy of this note will be sent to the surgeon.    PROBLEM LIST:   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     Corneal opacity     Hypothyroidism     Osteopenia, unspecified location     Aromatase inhibitor use       PAST SURGICAL HX:   Past Surgical History:   Procedure Laterality Date     ABDOMEN SURGERY      ovarian cyst, mesh     ARTHRODESIS WRIST       ARTHRODESIS WRIST  2/14/2013    Procedure: ARTHRODESIS WRIST;  left wrist scaphoid excision, four bone fusion, iliac crest bone graft  ( Mac with block);  Surgeon: Av Mendez MD;  [facility]: [facility]     BIOPSY      skin, vaginal     BIOPSY OF SKIN LESION       COLONOSCOPY  12/24/2013    Procedure: COMBINED COLONOSCOPY, SINGLE BIOPSY/POLYPECTOMY BY BIOPSY;  COLONOSCOPY;  Surgeon: Dom Alvarez MD;  Location:  GI     COSMETIC SURGERY       ESOPHAGOSCOPY, GASTROSCOPY, DUODENOSCOPY (EGD), COMBINED N/A 11/23/2016    Procedure: COMBINED ESOPHAGOSCOPY, GASTROSCOPY, DUODENOSCOPY (EGD);  Surgeon: Quinten Feliciano MD;  Location:  GI     EXTERNAL EAR SURGERY      right     EYE SURGERY      radial keratomy     GRAFT BONE FROM ILIAC CREST  2/14/2013     Procedure: GRAFT BONE FROM ILIAC CREST;  mac with block and local infilitration;  Surgeon: Av Mendez MD;  [facility]      BREATH HYDROGEN TEST N/A 10/14/2016    Procedure: HYDROGEN BREATH TEST;  Surgeon: Cheri Barron MD;  Location: [facility]     HERNIA REPAIR      UMBILICAL     HERNIA REPAIR      umbilical age 18 mos.     HERNIA REPAIR       HERNIORRHAPHY UMBILICAL  1/31/1954     HYSTERECTOMY TOTAL ABDOMINAL  5/3/00     HYSTERECTOMY TOTAL ABDOMINAL  5/3/2000     MASTECTOMY      PROPHYLACTIC RIGHT BREAST     MASTECTOMY MODIFIED RADICAL      LEFT BREAST     MASTECTOMY MODIFIED RADICAL      bilateral; right breast prophylactic     PARATHYROIDECTOMY  9/23/04    R SUPERIOR     PARATHYROIDECTOMY  9/23/2004    parathyroid resection, subtotal     REMOVE HARDWARE HAND  9/24/2013    Procedure: REMOVE HARDWARE HAND;  Left Hand Screw Removal        RHINOPLASTY  12/31/1985     RHINOPLASTY  12/31/1985     thyr proc skin closed cosmetic manner by subcuticular stitch  1/23/2009     TONSILLECTOMY  3/1/68     TONSILLECTOMY  3/1/1968     WRIST SURGERY      wrist arthrodesis       FAMILY MEDICAL HX:   Family History   Problem Relation Age of Onset     HEART DISEASE Father      AAA     Hypertension Father      Neurologic Disorder Mother      Anuerysm of Cerebral Artery, Dementia     DIABETES Mother      Thyroid Disease Mother      ,     CEREBROVASCULAR DISEASE Mother      Dementia Mother      OSTEOPOROSIS Mother      DIABETES Maternal Grandmother      Asthma Maternal Grandmother      DIABETES Maternal Aunt      x2     Melanoma Maternal Aunt      Circulatory Brother      Perihperal Neurophathy     Dementia Other      CANCER Other      malignant melanoma     Hypertension Other      Hypertension Other      CEREBROVASCULAR DISEASE Other      CEREBROVASCULAR DISEASE Other      Obesity Other      Respiratory Other      Chronic Obstructive Pulmonary Disease Maternal Grandfather      father     Asthma Maternal  Grandfather      Breast Cancer Cousin      CANCER Other      DIABETES Other      Asthma Other      Glaucoma No family hx of      Macular Degeneration No family hx of      Coronary Artery Disease No family hx of      Hyperlipidemia No family hx of      KIDNEY DISEASE No family hx of      Thrombosis No family hx of      Arthritis No family hx of      Depression No family hx of      MENTAL ILLNESS No family hx of      Substance Abuse No family hx of      Cystic Fibrosis No family hx of      Early Death No family hx of      Coronary Artery Disease Early Onset No family hx of      Heart Failure No family hx of      Bleeding Diathesis No family hx of      Ovarian Cancer No family hx of      Uterine Cancer No family hx of      Prostate Cancer No family hx of      Colorectal Cancer No family hx of      Pancreatic Cancer No family hx of      Lung Cancer No family hx of      Other Cancer No family hx of      Autoimmune Disease No family hx of      Unknown/Adopted No family hx of      Genetic Disorder No family hx of        IMMUNIZATION HX:   Immunization History   Administered Date(s) Administered     HEPA 04/26/2005, 10/26/2005     HepB 04/26/2005, 06/02/2005, 10/26/2005     Influenza (High Dose) 3 valent vaccine 10/06/2017     Influenza (IIV3) PF 10/01/2008, 09/29/2009, 10/01/2011, 10/02/2012, 10/09/2013, 10/01/2014     Influenza Intranasal Vaccine 4 valent 10/02/2015     Influenza Vaccine IM 3yrs+ 4 Valent IIV4 10/13/2016     Pneumo Conj 13-V (2010&after) 08/01/2017     Pneumococcal 23 valent 10/01/2008     TD (ADULT, 7+) 01/10/2000, 04/26/2005     TDAP Vaccine (Boostrix) 11/06/2012     Typhoid IM 04/26/2005     Zoster vaccine, live 11/14/2012       MEDICATIONS:   Current Outpatient Prescriptions   Medication Sig Dispense Refill     levothyroxine (SYNTHROID/LEVOTHROID) 112 MCG tablet Take 0.5 tab daily 45 tablet 3     HYDROcodone-acetaminophen (NORCO) 5-325 MG per tablet Take 1 tablet by mouth every 6 hours as needed 40  tablet 0     anastrozole (ARIMIDEX) 1 MG tablet Take 1 tablet (1 mg) by mouth daily 90 tablet 3     triamterene-hydrochlorothiazide (MAXZIDE-25) 37.5-25 MG per tablet Take 1 tablet by mouth daily 90 tablet 3     amLODIPine (NORVASC) 10 MG tablet Take 1 tablet (10 mg) by mouth daily AS DIRECTED 90 tablet 3     atorvastatin (LIPITOR) 40 MG tablet Take 1 tablet (40 mg) by mouth daily Please put on profile- pt just received 90-day supply of cholesterol meds 90 tablet 3     gabapentin (NEURONTIN) 300 MG capsule 1-2 tablets EVERY 8 HOURS 540 capsule 3     metoprolol (TOPROL-XL) 25 MG 24 hr tablet Take 1 tablet (25 mg) by mouth daily 90 tablet 3     Coenzyme Q10 (COQ10) 200 MG CAPS Take 1 capsule by mouth daily       omeprazole (PRILOSEC) 20 MG CR capsule Take 20 mg by mouth daily       ketoconazole (NIZORAL) 2 % shampoo Apply topically daily as needed for itching or irritation 120 mL 11     terbinafine (LAMISIL AT) 1 % cream Apply topically 2 times daily For fungal infection not resolved with other antifungals (e.g. Clotrimazole) 12 g 1     ondansetron (ZOFRAN-ODT) 4 MG ODT tab 1-2 tabs po prn every 6 hours 90 tablet 2     diclofenac (VOLTAREN) 1 % GEL topical gel Apply 4 grams to knees or 2 grams to hands four times daily using enclosed dosing card. 100 g 1     Omega-3 Fatty Acids (OMEGA-3 FISH OIL PO) Take 1 capsule by mouth daily        LORazepam (ATIVAN) 2 MG tablet Take 1 tablet at night for sleep 30 tablet 1     cholecalciferol (VITAMIN D) 1000 UNIT tablet Take 1 tablet (1,000 Units) by mouth daily 100 tablet 3     cholecalciferol (VITAMIN D3) 5000 UNITS CAPS capsule Take 1 capsule (5,000 Units) by mouth daily 120 capsule 3     triamcinolone (KENALOG) 0.1 % ointment Apply topically 2 times daily 30 g 1     Vaginal Lubricant (REPHRESH) GEL Place 2 g vaginally every 3 days 14 Box 3     Multiple Vitamin (MULTI-VITAMIN) per tablet Take 1 tablet by mouth daily.         SOCIAL HX:   Social History     Social History      Marital status:      Spouse name: N/A     Number of children: N/A     Years of education: N/A     Social History Main Topics     Smoking status: Never Smoker     Smokeless tobacco: Never Used     Alcohol use Yes      Comment: Rare to occasional     Drug use: No     Sexual activity: Not Currently     Partners: Male     Birth control/ protection: Abstinence     Other Topics Concern      Service No     Caffeine Concern No     Occupational Exposure No     Hobby Hazards No     Sleep Concern No     Stress Concern No     Weight Concern No     Special Diet No     Back Care No     Exercise Yes     walks 4-6x  week for 20-30 min. each     Seat Belt Yes     Self-Exams Yes     Parent/Sibling W/ Cabg, Mi Or Angioplasty Before 65f 55m? No     Social History Narrative    .  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       This is a LOW risk surgery.    HPI:   Reason for surgery: She has had increasingly poor vision OS >OD. She has glare from lights at night and has difficulty reading small print.  No flashes or floaters.    Cardiovascular Risk:  This patient ambulates without assist.  without   chest pain. She IS able to climb a flight of stairs without chest pain.    The patient does not  have chest pain Exercise.    She does  have a history of hypertension and high cholesterol.   The patient does not  have a history of stroke, and does not  have a history of valvular disease.    Pulmonary Risk:  In terms of risk factors for pulmonary complications, the patient does not  have a history of Asthma    Pulmonary Risk:  In terms of risk factors for pulmonary complications, the patient does not have a history of CHF, COPD, age >60 years, being functionally dependent.       Is this patient going to undergo any of the following procedures that would put him at higher risk of postoperative pulmonary complications:  Prolonged surgery (>3 hours): No  Abdominal surgery/thoracic  "surgery/neurosurgery/head and neck: No  Vascular/aortic aneurysm repair: No  General anesthesia: No    Perioperative Complications:  The patient does not  have a history of bleeding or clotting problems in the past. The patient has not  had complications from past surgeries.  The patient does not  have a family history of any anesthesia or surgical complications.      ROS:  Constitutional: no fevers, night sweats or unintentional weight change   Eyes: see chief complaint.  Ears, Nose, Mouth, Throat: no tinnitus or hearing change, she has hearing aides, cerumen in right ear canal,  no epistaxis or nasal discharge, no oral lesions, throat clear   Cardiovascular: no chest pain, palpitations, or pain with walking, no orthopnea or PND   Respiratory: no dyspnea, cough, shortness of breath or wheezing   GI: no new GI symptoms.   : no new Urologic symptoms  Musculoskeletal: no joint or muscle pain or swelling   Integumentary: no concerning lesions or moles   Neuro: no loss of strength or sensation, no numbness or tingling, no tremor, no dizziness, no headache   Endo: no polyuria or polydipsia, no temperature intolerance   Heme/Lymph: no concerning bumps, no bleeding problems   Allergy: no environmental allergies   Psych: no depression or anxiety, no sleep problems      PHYSICAL EXAM:  /85  Pulse 94  Temp 98.9  F (37.2  C) (Oral)  Ht 1.702 m (5' 7\")  Wt 95.8 kg (211 lb 1.6 oz)  SpO2 96%  BMI 33.06 kg/m2    Wt Readings from Last 1 Encounters:   02/15/18 95.8 kg (211 lb 1.6 oz)     Constitutional: no distress, comfortable, pleasant   Eyes: anicteric. Glasses.  Ears, Nose and Throat: tympanic membranes clear on left and cerumen present in the right,,  throat clear, neck supple with full range of motion, no thyromegaly.   Cardiovascular: regular rate and rhythm, normal S1 and S2, 1/6 BRAD RUSB,  no rubs or gallops, peripheral pulses full and symmetric. 1 + pitting edema bilaterally.   Respiratory: clear to " auscultation, no wheezes or crackles, normal breath sounds   Gastrointestinal: positive bowel sounds, nontender, no hepatosplenomegaly, no masses   Musculoskeletal: full range of motion.  Skin: no concerning lesions, no jaundice   Neurological: normal gait, no tremor   Psychological: appropriate mood   Lymphatic: no cervical, supra or infra clavicular lymphadenopathy      A/P:    The patient with   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     Corneal opacity     Hypothyroidism     Osteopenia, unspecified location     Aromatase inhibitor use    presents prior to surgery for assessment of perioperative risk. The patient is at LOW   risk for cardiovascular complications and at LOW   risk for pulmonary complications of this LOW   risk surgery.      --Approval given to proceed with proposed procedure, without further diagnostic evaluation   This procedure will be done under local anesthesia.     No evidence of functionally compromising cardiac or pulmonary status  Clear for anesthesia and surgery  The patient is recommended to hold aspirin or NSAIDS for 10 days prior to surgery.    Pre-Op Plan:   Proceed with surgery as planned.  No food for 8 hours before surgery.  No liquid for 2 hours before surgery.   Call surgeon  If you develop a fever, respiratory illness or other symptoms.  Hold the following medications the morning of  Surgery:  Take the following medications the morning of surgery with a sip of water:    Letter  sent to requesting surgeon  listed above  Written pre-operative instructions given.      Need for shingles vaccine  -     ZOSTER VACCINE RECOMBINANT ADJUVANTED IM NJX (SHINGRIX)  -     ZOSTER VACCINE RECOMBINANT ADJUVANTED IM NJX (SHINGRIX); Future    Pre-operative examination  -     CBC with platelets; Future  -     Basic metabolic panel; Future    Essential hypertension, benign  -     Discontinue: amLODIPine (NORVASC) 10 MG tablet; Take 1 tablet (10 mg) by mouth At Bedtime AS DIRECTED  -     Discontinue: amLODIPine (NORVASC) 5 MG tablet; Take 1 tablet (5 mg) by mouth daily  -     losartan (COZAAR) 25 MG tablet; Take 1 tablet (25 mg) by mouth daily  -     amLODIPine (NORVASC) 5 MG tablet; Take 1 tablet (5 mg) by mouth At Bedtime    Need for 23-polyvalent pneumococcal polysaccharide vaccine  -     Pneumococcal vaccine 23 valent PPSV23  (Pneumovax) [85630]; Future      Laboratory studies:  CBC and BMP  Results for DWAYNE STILES (MRN [id number]) as of 2/16/2018 14:56   Ref. Range 2/15/2018 19:48   Sodium Latest Ref Range: 133 - 144 mmol/L 141   Potassium Latest Ref Range: 3.4 - 5.3 mmol/L 3.2 (L)   Chloride Latest Ref Range: 94 - 109 mmol/L 104   Carbon Dioxide Latest Ref Range: 20 - 32 mmol/L 29   Urea Nitrogen Latest Ref Range: 7 - 30 mg/dL 15   Creatinine Latest Ref Range: 0.52 - 1.04 mg/dL 0.60   GFR Estimate Latest Ref Range: >60 mL/min/1.7m2 >90   GFR Estimate If Black Latest Ref Range: >60 mL/min/1.7m2 >90   Calcium Latest Ref Range: 8.5 - 10.1 mg/dL 8.9   Anion Gap Latest Ref Range: 3 - 14 mmol/L 8     Results for DWAYNE STILES (MRN [id number]) as of 2/16/2018 14:56   Ref. Range 2/15/2018 19:48   Glucose Latest Ref Range: 70 - 99 mg/dL 108 (H)   WBC Latest Ref Range: 4.0 - 11.0 10e9/L 8.1   Hemoglobin Latest Ref Range: 11.7 - 15.7 g/dL 12.1   Hematocrit Latest Ref Range: 35.0 - 47.0 % 38.0   Platelet Count Latest Ref Range: 150 - 450 10e9/L 259   RBC Count Latest Ref Range: 3.8 - 5.2 10e12/L 4.38    MCV Latest Ref Range: 78 - 100 fl 87   MCH Latest Ref Range: 26.5 - 33.0 pg 27.6   MCHC Latest Ref Range: 31.5 - 36.5 g/dL 31.8   RDW Latest Ref Range: 10.0 - 15.0 % 15.6 (H)       Pregnancy testing was not indicated.    Cardiovascular: EKG was not indicated based on risk assessment.       Total time spent 40 minutes.  More than 50% of the time spent with Ms. Perez on counseling / coordinating her care      Please contact our office if there are any further questions or information required about this patient.      Matilde LONG, CNP    Answers for HPI/ROS submitted by the patient on 2/15/2018   General Symptoms: No  Skin Symptoms: No  HENT Symptoms: Yes  EYE SYMPTOMS: Yes  HEART SYMPTOMS: Yes  LUNG SYMPTOMS: No  INTESTINAL SYMPTOMS: Yes  URINARY SYMPTOMS: Yes  GYNECOLOGIC SYMPTOMS: Yes  BREAST SYMPTOMS: No  SKELETAL SYMPTOMS: Yes  BLOOD SYMPTOMS: No  NERVOUS SYSTEM SYMPTOMS: Yes  MENTAL HEALTH SYMPTOMS: No  Ear pain: No  Ear discharge: No  Hearing loss: Yes  Tinnitus: Yes  Nosebleeds: No  Congestion: No  Sinus pain: No  Trouble swallowing: No   Voice hoarseness: No  Mouth sores: No  Sore throat: No  Tooth pain: No  Gum tenderness: No  Bleeding gums: No  Change in taste: No  Change in sense of smell: No  Dry mouth: Yes  Hearing aid used: Yes  Neck lump: No  Eye pain: No  Vision loss: Yes  Dry eyes: Yes  Watery eyes: No  Eye bulging: No  Double vision: No  Flashing of lights: No  Spots: No  Floaters: Yes  Redness: No  Crossed eyes: No  Tunnel Vision: No  Yellowing of eyes: No  Eye irritation: No  Chest pain or pressure: No  Fast or irregular heartbeat: Yes  Pain in legs with walking: No  Trouble breathing while lying down: No  Fingers or toes appear blue: No  High blood pressure: Yes  Low blood pressure: No  Fainting: No  Murmurs: No  Pacemaker: No  Varicose veins: No  Edema or swelling: Yes  Wake up at night with shortness of breath: No  Light-headedness: No  Exercise intolerance: No  Heart burn or  indigestion: Yes  Nausea: Yes  Vomiting: No  Abdominal pain: No  Bloating: Yes  Constipation: No  Diarrhea: Yes  Blood in stool: No  Black stools: No  Rectal or Anal pain: No  Fecal incontinence: No  Yellowing of skin or eyes: No  Vomit with blood: No  Trouble holding urine or incontinence: Yes  Pain or burning: No  Trouble starting or stopping: Yes  Increased frequency of urination: Yes  Blood in urine: No  Decreased frequency of urination: No  Frequent nighttime urination: No  Flank pain: No  Difficulty emptying bladder: No  Back pain: Yes  Muscle aches: Yes  Neck pain: Yes  Swollen joints: No  Joint pain: Yes  Bone pain: Yes  Muscle cramps: Yes  Muscle weakness: No  Joint stiffness: Yes  Bone fracture: No  Trouble with coordination: No  Dizziness or trouble with balance: Yes  Fainting or black-out spells: No  Memory loss: No  Headache: No  Seizures: No  Speech problems: No  Tingling: Yes  Tremor: No  Weakness: No  Difficulty walking: Yes  Paralysis: No  Numbness: Yes  Bleeding or spotting between periods: No  Heavy or painful periods: No  Irregular periods: No  Vaginal discharge: No  Hot flashes: No  Vaginal dryness: Yes  Genital ulcers: No  Reduced libido: Yes  Painful intercourse: Yes  Difficulty with sexual arousal: Yes  Post-menopausal bleeding: No

## 2018-02-16 NOTE — NURSING NOTE
Chief Complaint   Patient presents with     Pre-Op Exam     DOS: 2.20.18 for left eye cataract and DOS: 3.13.18 for right eye cataract.

## 2018-02-16 NOTE — PROGRESS NOTES
Surgeon (please enter first and last name):  Dr Tyson Ruby  Fax number for Preop Evaluation:  [phone]  Location of Surgery: [facility]   Date of Surgery:  2.20.18 and 3.13.18  Procedure:  Left eye cataract and right eye cataract   History of reaction to anesthesia?  No

## 2018-02-20 ENCOUNTER — TRANSFERRED RECORDS (OUTPATIENT)
Dept: HEALTH INFORMATION MANAGEMENT | Facility: CLINIC | Age: 68
End: 2018-02-20

## 2018-02-21 ENCOUNTER — OFFICE VISIT (OUTPATIENT)
Dept: OPHTHALMOLOGY | Facility: CLINIC | Age: 68
End: 2018-02-21
Attending: OPHTHALMOLOGY
Payer: MEDICARE

## 2018-02-21 DIAGNOSIS — H25.13 NUCLEAR SENILE CATARACT OF BOTH EYES: ICD-10-CM

## 2018-02-21 DIAGNOSIS — H18.529 ANTERIOR BASEMENT MEMBRANE DYSTROPHY: Primary | ICD-10-CM

## 2018-02-21 DIAGNOSIS — H17.9 CORNEAL SCARS, BOTH EYES: ICD-10-CM

## 2018-02-21 PROCEDURE — G0463 HOSPITAL OUTPT CLINIC VISIT: HCPCS | Mod: ZF

## 2018-02-21 ASSESSMENT — TONOMETRY
IOP_METHOD: ICARE
OD_IOP_MMHG: 23
OS_IOP_MMHG: 17

## 2018-02-21 ASSESSMENT — VISUAL ACUITY
OS_SC+: -1
METHOD: SNELLEN - LINEAR
OD_SC: 20/300
OS_SC: 20/30
OS_PH_SC: 20/20-2

## 2018-02-21 ASSESSMENT — REFRACTION_WEARINGRX
OS_CYLINDER: +2.00
OS_ADD: +2.25
OS_AXIS: 013
OD_AXIS: 005
OS_SPHERE: +6.75
OD_CYLINDER: +1.75
OD_SPHERE: +7.25
SPECS_TYPE: PAL
OD_ADD: +2.25

## 2018-02-21 ASSESSMENT — SLIT LAMP EXAM - LIDS
COMMENTS: NORMAL
COMMENTS: NORMAL

## 2018-02-21 ASSESSMENT — EXTERNAL EXAM - LEFT EYE: OS_EXAM: NORMAL

## 2018-02-21 ASSESSMENT — EXTERNAL EXAM - RIGHT EYE: OD_EXAM: NORMAL

## 2018-02-21 NOTE — NURSING NOTE
Chief Complaints and History of Present Illnesses   Patient presents with     Post Op (Ophthalmology) Left Eye     1 day s/p CE/IOL LE     HPI    Affected eye(s):  Left   Symptoms:        Duration:  1 day   Frequency:  Constant       Do you have eye pain now?:  No      Comments:  Pt. States that she is having some discomfort LE.  Can see a moon shaped reflection inferior vision LE.  Was able to sleep well last night.   Letitia Nagy COT 1:31 PM February 21, 2018

## 2018-02-21 NOTE — MR AVS SNAPSHOT
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 Light aerobic activity is acceptable. Avoid swimming pools, hot tubs, or saunas for 3-4 weeks.   Wear the protective shield at night for the next seven days, and call for increased redness, discharge, pain, or decreased vision.            Follow-ups after your visit        Follow-up notes from your care team     Return in about 1 week (around 2/28/2018).      Your next 10 appointments already scheduled     Feb 26, 2018  8:30 AM CST   (Arrive by 8:15 AM)   Initial Review Program with Leyda Keyes [facility] ([facility])    [address] [place] [address]  4th [facility] [address]   [phone]            Feb 28, 2018  1:00 PM CST   Post-Op with Tyson Ruby MD   Eye Clinic ([facility])    [address] [address]   [phone]            Mar 14, 2018  1:30 PM CDT   Post-Op with Tyson Ruby MD   Eye Clinic ([facility])    [address] [address]   [phone]            Mar 21, 2018  1:00 PM CDT   Post-Op with Tyson Ruby MD   Eye Clinic ([facility])    [address] [address]    [phone]            Apr 11, 2018  1:30 PM CDT   Post-Op with Tyson Ruby MD   Eye Clinic ([facility])    [address]  9th Fl Clin 9a  [facility] [address] [phone]            May 14, 2018  9:30 AM CDT   [facility] Lab Draw with  [facility] LAB DRAW   [facility])    [address] [place] [address]  [address]  [facility] [address] [phone]            May 14, 2018 10:00 AM CDT   (Arrive by 9:45 AM)   Return Visit with Khushbu Louise MD   [facility] ([facility])    [address] [place] [address]  [address]  [facility] [address] [phone]            May 14, 2018 10:30 AM CDT   Infusion 60 with UC ONCOLOGY INFUSION, UC 31 ATC   [facility] ([facility])    [address] [place] [address]  [address]  [facility] [address] [phone]              Who to contact     Please call your clinic at [phone] to:    Ask questions about your health    Make or cancel appointments    Discuss your medicines    Learn about your test results    Speak to your doctor            Additional Information About Your Visit        Toonimo Information     Toonimo gives you secure access to your electronic health record. If you see a primary care provider, you can also send messages to your care team and make appointments. If you have questions, please call your primary care clinic.  If you do not have a primary care provider, please call [phone] and they will assist you.      Toonimo is an electronic gateway that provides easy, online access to your medical records. With Toonimo, you can request a clinic appointment, read your test results, renew a prescription or communicate with your care team.     To access your existing account, please contact your [facility] or call [phone] for assistance.         Care EveryWhere ID     This is your Care EveryWhere ID. This could be used by other organizations to access your [facility] medical records  [id number]         Blood Pressure from Last 3 Encounters:   02/15/18 134/85   11/29/17 134/75   11/21/17 134/81    Weight from Last 3 Encounters:   02/15/18 95.8 kg (211 lb 1.6 oz)   12/21/17 95.3 kg (210 lb)   11/29/17 95.3 kg (210 lb)              Today, you had the following     No orders found for display       Primary Care Provider Office Phone # Fax #    MERCEDES Rivera [phone]       [address] [place] [address] 741  [facility] [address]        Equal Access to Services     EDEL LAMA : Hadii jacinta sousao Soomaali, waaxda luqadaha, qaybta kaalmada adeegyada, waxmoon seals . So [facility] [place] [facility] [phone].    ATENCIÓN: Si habla español, tiene a davis disposición servicios gratuitos de asistencia lingüística. [facility] [phone].    We comply with applicable federal civil rights laws and [place] laws. We do not discriminate on the basis of race, color, national origin, age, disability, sex, sexual orientation, or gender identity.            Thank you!     Thank you for choosing EYE CLINIC  for your care. Our goal is always to provide you with excellent care. Hearing back from our patients is one way we can continue to improve our services. Please take a few minutes to complete the written survey that you may receive in the mail after your visit with us. Thank you!             Your Updated Medication List - Protect others around you: Learn how to safely use, store and throw away your medicines at www.disposemymeds.org.          This list is accurate as of 2/21/18 11:59 PM.  Always use your most recent med list.                   Brand Name Dispense Instructions for use Diagnosis    amLODIPine 5 MG tablet    NORVASC    90 tablet    Take 1 tablet (5 mg) by mouth At Bedtime    Essential hypertension, benign       anastrozole 1 MG  tablet    ARIMIDEX    90 tablet    Take 1 tablet (1 mg) by mouth daily    Malignant neoplasm of left breast in female, estrogen receptor positive, unspecified site of breast (H)       atorvastatin 40 MG tablet    LIPITOR    90 tablet    Take 1 tablet (40 mg) by mouth daily Please put on profile- pt just received 90-day supply of cholesterol meds    Pure hypercholesterolemia       * cholecalciferol 1000 UNIT tablet    vitamin D3    100 tablet    Take 1 tablet (1,000 Units) by mouth daily    Osteoporosis       * cholecalciferol 5000 UNITS Caps capsule    vitamin D3    120 capsule    Take 1 capsule (5,000 Units) by mouth daily    Vaginal dryness       CoQ10 200 MG Caps      Take 1 capsule by mouth daily        diclofenac 1 % Gel topical gel    VOLTAREN    100 g    Apply 4 grams to knees or 2 grams to hands four times daily using enclosed dosing card.    Primary osteoarthritis of both wrists       gabapentin 300 MG capsule    NEURONTIN    540 capsule    1-2 tablets EVERY 8 HOURS    Neuropathic pain       HYDROcodone-acetaminophen 5-325 MG per tablet    NORCO    40 tablet    Take 1 tablet by mouth every 6 hours as needed    Chronic left-sided low back pain, with sciatica presence unspecified       ketoconazole 2 % shampoo    NIZORAL    120 mL    Apply topically daily as needed for itching or irritation    Dermatitis, seborrheic       levothyroxine 112 MCG tablet    SYNTHROID/LEVOTHROID    45 tablet    Take 0.5 tab daily    Hypothyroidism, unspecified type       LORazepam 2 MG tablet    ATIVAN    30 tablet    Take 1 tablet at night for sleep    Sleep disorder       losartan 25 MG tablet    COZAAR    90 tablet    Take 1 tablet (25 mg) by mouth daily    Essential hypertension, benign       metoprolol succinate 25 MG 24 hr tablet    TOPROL-XL    90 tablet    Take 1 tablet (25 mg) by mouth daily    Irregular heart rate       Multi-vitamin Tabs tablet   Generic drug:  multivitamin, therapeutic with minerals      Take 1 tablet  by mouth daily.        OMEGA-3 FISH OIL PO      Take 1 capsule by mouth daily        omeprazole 20 MG CR capsule    priLOSEC     Take 20 mg by mouth daily        ondansetron 4 MG ODT tab    ZOFRAN-ODT    90 tablet    1-2 tabs po prn every 6 hours    Nausea       REPHRESH Gel     14 Box    Place 2 g vaginally every 3 days    Urinary tract infection, site not specified       terbinafine 1 % cream    lamISIL AT    12 g    Apply topically 2 times daily For fungal infection not resolved with other antifungals (e.g. Clotrimazole)    Tinea pedis of both feet       triamcinolone 0.1 % ointment    KENALOG    30 g    Apply topically 2 times daily    Eczematous dermatitis       triamterene-hydrochlorothiazide 37.5-25 MG per tablet    MAXZIDE-25    90 tablet    Take 1 tablet by mouth daily    Essential hypertension, benign       * Notice:  This list has 2 medication(s) that are the same as other medications prescribed for you. Read the directions carefully, and ask your doctor or other care provider to review them with you.

## 2018-02-21 NOTE — NURSING NOTE
Late entry 2.15.18 for Shingrix, patient requested to proceed with vaccination without first checking with insurance. ERUM Lay LPN

## 2018-02-21 NOTE — PATIENT INSTRUCTIONS
Medications in the surgical eye:    prednisolone acetate 1% four times a day     Ketorolac 0.4% four times a day     Ofloxacin four times a day      Limit heavy lifting, bending over, and heavy exertion. Light aerobic activity is acceptable. Avoid swimming pools, hot tubs, or saunas for 3-4 weeks.   Wear the protective shield at night for the next seven days, and call for increased redness, discharge, pain, or decreased vision.

## 2018-02-21 NOTE — PROGRESS NOTES
Assessment / Plan:    Nadira Perez is a 67 year old female who is 1 day s/p cataract extraction with intraocular lens placement left eye.    Off to a good start.  Sutures in place.     Medications in the surgical eye:    prednisolone acetate 1% four times a day     Ketorolac 0.4% four times a day     Ofloxacin four times a day      Limit heavy lifting, bending over, and heavy exertion. Light aerobic activity is acceptable. Avoid swimming pools, hot tubs, or saunas for 3-4 weeks.   Wear the protective shield at night for the next seven days, and call for increased redness, discharge, pain, or decreased vision.    Return to clinic in approximately 1 week, earlier as needed.    Lucas Madison, DO  Cornea Fellow    ~~~~~~~~~~~~~~~~~~~~~~~~~~~~~~~~~~~~~~~~~~~~~~~~~~~~~~~~~~~~~~~~    Complete documentation of historical and exam elements from today's encounter can be found in the full encounter summary report (not reduplicated in this progress note). I personally obtained the chief complaint(s) and history of present illness.  I confirmed and edited as necessary the review of systems, past medical/surgical history, family history, social history, and examination findings as documented by others; and I examined the patient myself. I personally reviewed the relevant tests, images, and reports as documented above. I formulated and edited as necessary the assessment and plan and discussed the findings and management plan with the patient and family.    Tyson Ruby MD

## 2018-02-23 DIAGNOSIS — I10 ESSENTIAL HYPERTENSION, BENIGN: ICD-10-CM

## 2018-02-25 NOTE — TELEPHONE ENCOUNTER
" amLODIPine (NORVASC) 5 MG tablet     Last Written Prescription Date:  2/15/18  Last Fill Quantity: 90,   # refills: 3  Last Office Visit :2/15/18  Future Office visit:  none    Routing because:  Please clarify dosin/15/18  5mg  Hs.    Fax received from [facility] that reads, \"Patient states she is supposed to do 1 more round of 10 mg dose. When new Rx was sent in it inactivated the former Rx so we cannot fill the 10 mg dose\".   "

## 2018-02-26 ENCOUNTER — OFFICE VISIT (OUTPATIENT)
Dept: AUDIOLOGY | Facility: CLINIC | Age: 68
End: 2018-02-26
Payer: COMMERCIAL

## 2018-02-26 DIAGNOSIS — H90.3 SENSORINEURAL HEARING LOSS, BILATERAL: Primary | ICD-10-CM

## 2018-02-26 RX ORDER — AMLODIPINE BESYLATE 10 MG/1
10 TABLET ORAL AT BEDTIME
Qty: 90 TABLET | Refills: 3 | Status: SHIPPED | OUTPATIENT
Start: 2018-02-26 | End: 2018-07-24

## 2018-02-26 NOTE — MR AVS SNAPSHOT
After Visit Summary   2/26/2018    Nadira Perez    MRN: [id number]           Patient Information     Date Of Birth          1950        Visit Information        Provider Department      2/26/2018 8:30 AM Leyda Keyes AuD [facility]        Today's Diagnoses     Sensorineural hearing loss, bilateral    -  1       Follow-ups after your visit        Your next 10 appointments already scheduled     Feb 28, 2018  1:00 PM CST   Post-Op with Tyson Ruby MD   Eye Clinic ([facility])    [address]  [facility] [address]   [phone]            Mar 14, 2018  1:30 PM CDT   Post-Op with Tyson Ruby MD   Eye Clinic ([facility])    [address]  [facility] [address]   [phone]            Mar 21, 2018  1:00 PM CDT   Post-Op with Tyson Ruby MD   Eye Clinic ([facility])    [address]  [facility] [address]   [phone]            Apr 11, 2018  1:30 PM CDT   Post-Op with Tyson Ruby MD   Eye Clinic ([facility])    [address]  [facility] [address]   [phone]            May 14, 2018  9:30 AM CDT   [facility] Lab Draw with  MASONIC LAB DRAW   [facility] Lab Draw ([facility])    [address] [place] [address]  [address]  [facility] [address]   [phone]            May 14, 2018 10:00 AM CDT   (Arrive by 9:45 AM)   Return Visit with Khushbu Louise MD   [facility] ([facility])    [address] [place] [address]  [address]  [facility] [address]   [phone]            May 14, 2018 10:30 AM CDT   Infusion 60 with UC ONCOLOGY INFUSION, UC 31 ATC   [facility] ([facility])    [address] [place] [address]  [address]  [address]  [facility] [address] [phone]              Who to contact     Please call your clinic at [phone] to:    Ask questions about your health    Make or cancel appointments    Discuss your medicines    Learn about your test results    Speak to your doctor            Additional Information About Your Visit        MyChart Information     Tempolibt gives you secure access to your electronic health record. If you see a primary care provider, you can also send messages to your care team and make appointments. If you have questions, please call your primary care clinic.  If you do not have a primary care provider, please call [phone] and they will assist you.      [email or web address] is an electronic gateway that provides easy, online access to your medical records. With [email or web address], you can request a clinic appointment, read your test results, renew a prescription or communicate with your care team.     To access your existing account, please contact your [facility] or call [phone] for assistance.        Care EveryWhere ID     This is your Care EveryWhere ID. This could be used by other organizations to access your [facility] medical records  [id number]         Blood Pressure from Last 3 Encounters:   02/15/18 134/85   11/29/17 134/75   11/21/17 134/81    Weight from Last 3 Encounters:   02/15/18 95.8 kg (211 lb 1.6 oz)   12/21/17 95.3 kg (210 lb)   11/29/17 95.3 kg (210 lb)              We Performed the Following     No Charge, Hearing Aid Clinic Visit        Primary Care Provider Office Phone # Fax #    Matilde MARCELINO Quiñonez, APRN [phone]       [address] [place] [address] 7[address]  [facility] [address]        Equal Access to Services     EDEL LAMA : Hadii jacinta Russo, wadelphine manuel, qaolivierta kaalmacierra donovan. So [facility] [phone].    ATENCIÓN: Si habla español, tiene a davis disposición servicios gratuitos de  juan pablo lingüísticloulou. Garrick al [phone].    We comply with applicable federal civil rights laws and [place] laws. We do not discriminate on the basis of race, color, national origin, age, disability, sex, sexual orientation, or gender identity.            Thank you!     Thank you for choosing [facility]UDI[facility]  for your care. Our goal is always to provide you with excellent care. Hearing back from our patients is one way we can continue to improve our services. Please take a few minutes to complete the written survey that you may receive in the mail after your visit with us. Thank you!             Your Updated Medication List - Protect others around you: Learn how to safely use, store and throw away your medicines at www.disposemymeds.org.          This list is accurate as of 2/26/18  9:25 AM.  Always use your most recent med list.                   Brand Name Dispense Instructions for use Diagnosis    amLODIPine 5 MG tablet    NORVASC    90 tablet    Take 1 tablet (5 mg) by mouth At Bedtime    Essential hypertension, benign       anastrozole 1 MG tablet    ARIMIDEX    90 tablet    Take 1 tablet (1 mg) by mouth daily    Malignant neoplasm of left breast in female, estrogen receptor positive, unspecified site of breast (H)       atorvastatin 40 MG tablet    LIPITOR    90 tablet    Take 1 tablet (40 mg) by mouth daily Please put on profile- pt just received 90-day supply of cholesterol meds    Pure hypercholesterolemia       * cholecalciferol 1000 UNIT tablet    vitamin D3    100 tablet    Take 1 tablet (1,000 Units) by mouth daily    Osteoporosis       * cholecalciferol 5000 UNITS Caps capsule    vitamin D3    120 capsule    Take 1 capsule (5,000 Units) by mouth daily    Vaginal dryness       CoQ10 200 MG Caps      Take 1 capsule by mouth daily        diclofenac 1 % Gel topical gel    VOLTAREN    100 g    Apply 4 grams to knees or 2 grams to hands four times daily using enclosed dosing card.    Primary  osteoarthritis of both wrists       gabapentin 300 MG capsule    NEURONTIN    540 capsule    1-2 tablets EVERY 8 HOURS    Neuropathic pain       HYDROcodone-acetaminophen 5-325 MG per tablet    NORCO    40 tablet    Take 1 tablet by mouth every 6 hours as needed    Chronic left-sided low back pain, with sciatica presence unspecified       ketoconazole 2 % shampoo    NIZORAL    120 mL    Apply topically daily as needed for itching or irritation    Dermatitis, seborrheic       levothyroxine 112 MCG tablet    SYNTHROID/LEVOTHROID    45 tablet    Take 0.5 tab daily    Hypothyroidism, unspecified type       LORazepam 2 MG tablet    ATIVAN    30 tablet    Take 1 tablet at night for sleep    Sleep disorder       losartan 25 MG tablet    COZAAR    90 tablet    Take 1 tablet (25 mg) by mouth daily    Essential hypertension, benign       metoprolol succinate 25 MG 24 hr tablet    TOPROL-XL    90 tablet    Take 1 tablet (25 mg) by mouth daily    Irregular heart rate       Multi-vitamin Tabs tablet   Generic drug:  multivitamin, therapeutic with minerals      Take 1 tablet by mouth daily.        OMEGA-3 FISH OIL PO      Take 1 capsule by mouth daily        omeprazole 20 MG CR capsule    priLOSEC     Take 20 mg by mouth daily        ondansetron 4 MG ODT tab    ZOFRAN-ODT    90 tablet    1-2 tabs po prn every 6 hours    Nausea       REPHRESH Gel     14 Box    Place 2 g vaginally every 3 days    Urinary tract infection, site not specified       terbinafine 1 % cream    lamISIL AT    12 g    Apply topically 2 times daily For fungal infection not resolved with other antifungals (e.g. Clotrimazole)    Tinea pedis of both feet       triamcinolone 0.1 % ointment    KENALOG    30 g    Apply topically 2 times daily    Eczematous dermatitis       triamterene-hydrochlorothiazide 37.5-25 MG per tablet    MAXZIDE-25    90 tablet    Take 1 tablet by mouth daily    Essential hypertension, benign       * Notice:  This list has 2 medication(s)  that are the same as other medications prescribed for you. Read the directions carefully, and ask your doctor or other care provider to review them with you.

## 2018-02-26 NOTE — PROGRESS NOTES
"AUDIOLOGY REPORT    SUBJECTIVE:Nadira Perez is a 67 year old female who was seen in the Audiology Clinic at the [facility] on 2/26/2018  for a follow-up check regarding the fitting of new hearing aids. Previous results have revealed bilateral sensorineural hearing loss.  The patient has been seen previously in this clinic and was fit with binaural Widex Beyond Fusion 330 hearing aids on 2/5/18.  Nadira reports that things are \"slightly better\". She reports that she still cannot hear at a distance (ex: students in the back of the class), soft sounds are too loud (ex: taking a straw out of the wrapper), and that she has noticed she is able to turn the TV volume down. She reports that she is wearing them for 8 hours or more a day but that she did not wear them for a few days around the time she had eye surgery. She also reported that the domes were \"itchy\".    OBJECTIVE:   The International Outcome Inventory-Hearing Aids (IOI-HA) was administered today.The patient s responses to the 7 questions can be compared to normative data relative to how others are performing with their hearing aids, as well as focusing audiologic care and counseling.This patient s Quality of Life score (Question 7) was 3, which is at normative average.     Based on patient report, the following changes were made; Soft level inputs were decreased 3 dB and a preference control was added (3 step range). A little bit of otoease was added around the dome to see if that helped with the reported irritation.     Patient was counseled regarding realistic expectations of hearing aids and listening at a distance.  It was reviewed that nothing will restore normal hearing, and likely in a large classroom a person with normal hearing would still have difficulty hearing someone in the back of the classroom. It was also discussed that she is still in the adaptation period and so she was encouraged to wear them full-time " "(data logging revealed that they were being worn for an average of 2.1 hours per day).    Reviewed 45 day trial period, care, cleaning (no water, dry brush), batteries (size 312) insertion/removal, toxicity, low-battery signal), aid insertion/removal, volume adjustment (if applicable), user booklet, warranty information, storage cases, and other hearing aid details.     Patient is still unsure if she would like to keep the hearing aids as she reports only noticing a \"slight\" benefit with the devices.     ASSESSMENT: A follow-up appointment for hearing aid fitting was completed today. IOI-HA administered today. Changes to hearing aid was completed as outlined above.     PLAN:Nadira will return for follow-up as needed, in 2-3 weeks for another appointemnt to check on progress. Today's appointment is a \"No Charge\" visit as the hearing aids are in warranty. Please call this clinic with any questions regarding today s appointment.    Rik Arauz  Audiologist  [place] License  #[id number]      "

## 2018-02-28 ENCOUNTER — OFFICE VISIT (OUTPATIENT)
Dept: OPHTHALMOLOGY | Facility: CLINIC | Age: 68
End: 2018-02-28
Attending: OPHTHALMOLOGY
Payer: MEDICARE

## 2018-02-28 DIAGNOSIS — Z98.890 POSTOPERATIVE EYE STATE: Primary | ICD-10-CM

## 2018-02-28 DIAGNOSIS — Z96.1 PSEUDOPHAKIA: ICD-10-CM

## 2018-02-28 DIAGNOSIS — H17.9 CORNEAL SCARS, BOTH EYES: ICD-10-CM

## 2018-02-28 PROCEDURE — G0463 HOSPITAL OUTPT CLINIC VISIT: HCPCS | Mod: ZF

## 2018-02-28 RX ORDER — PREDNISOLONE ACETATE 10 MG/ML
1 SUSPENSION/ DROPS OPHTHALMIC 3 TIMES DAILY
Qty: 1 BOTTLE | Refills: 0 | Status: SHIPPED | OUTPATIENT
Start: 2018-02-28 | End: 2018-07-24

## 2018-02-28 ASSESSMENT — CONF VISUAL FIELD
METHOD: COUNTING FINGERS
OS_NORMAL: 1
OD_NORMAL: 1

## 2018-02-28 ASSESSMENT — VISUAL ACUITY
OS_SC+: +1
OS_PH_SC: 20/30
OD_PH_SC: 20/40
OD_SC: 20/300
OS_SC: 20/50
METHOD: SNELLEN - LINEAR

## 2018-02-28 ASSESSMENT — TONOMETRY
IOP_METHOD: ICARE
OS_IOP_MMHG: 14
OD_IOP_MMHG: 20

## 2018-02-28 NOTE — NURSING NOTE
Chief Complaints and History of Present Illnesses   Patient presents with     Post Op (Ophthalmology) Left Eye     CEIOL     HPI    Affected eye(s):  Left   Symptoms:        Frequency:  Constant       Do you have eye pain now?:  No      Comments:  States that the va has gotten better  Has an after image of a small [place] that started today  Did have a fall on the felt side of body 4 days post op and is wondering if that might have done anything  Mariela Hugo COT 1:29 PM February 28, 2018

## 2018-02-28 NOTE — MR AVS SNAPSHOT
After Visit Summary   2/28/2018    Nadira Perez    MRN: [id number]           Patient Information     Date Of Birth          1950        Visit Information        Provider Department      2/28/2018 1:00 PM Tyson Ruby MD Eye Clinic        Today's Diagnoses     Postoperative eye state    -  1    Corneal scars, both eyes        Pseudophakia - Left Eye           Follow-ups after your visit        Your next 10 appointments already scheduled     Mar 09, 2018  9:00 AM CST   (Arrive by 8:45 AM)   Initial Review Program with Rik Arauz   [facility] Audiology ([facility] [place])    [address]  4th Floor  [facility] [address]   [phone]            Mar 14, 2018  1:30 PM CDT   Post-Op with Tyson Ruby MD   Eye Clinic ([facility])    [facility]  [address] [place] [address]  [address] [address]  [facility] [address]   [phone]            Mar 21, 2018  1:00 PM CDT   Post-Op with Tyson Ruby MD   Eye Clinic ([facility])    [facility]  [address] [place] [address]  [address] [address]  [facility] [address]   [phone]            Apr 11, 2018  1:30 PM CDT   Post-Op with Tyson Ruby MD   Eye Clinic ([facility])    [facility]  [address] [place] [address]  [address] [address]  [facility] [address]   [phone]            May 14, 2018  9:30 AM CDT   [facility] Lab Draw with  [facility] LAB DRAW   [facility] Draw ([facility])    [address]  [address]  [facility] [address]   [phone]            May 14, 2018 10:00 AM CDT   (Arrive by 9:45 AM)   Return Visit with Khushbu Louise MD   [facility] ([facility] [place])    [address]  [address]  [facility] [address]   [phone]            May 14, 2018 10:30 AM CDT   Infusion 60 with UC ONCOLOGY INFUSION, UC 31 ATC   [facility]  Cancer Clinic ([facility] [place])    [address]  [address]  [place] [address] [phone]              Who to contact     Please call your clinic at [phone] to:    Ask questions about your health    Make or cancel appointments    Discuss your medicines    Learn about your test results    Speak to your doctor            Additional Information About Your Visit        [employer] Information     Areshay gives you secure access to your electronic health record. If you see a primary care provider, you can also send messages to your care team and make appointments. If you have questions, please call your primary care clinic.  If you do not have a primary care provider, please call [phone] and they will assist you.      Areshay is an electronic gateway that provides easy, online access to your medical records. With Areshay, you can request a clinic appointment, read your test results, renew a prescription or communicate with your care team.     To access your existing account, please contact your [facility] or call [phone] for assistance.        Care EveryWhere ID     This is your Care EveryWhere ID. This could be used by other organizations to access your [place] medical records  [id number]         Blood Pressure from Last 3 Encounters:   02/15/18 134/85   11/29/17 134/75   11/21/17 134/81    Weight from Last 3 Encounters:   02/15/18 95.8 kg (211 lb 1.6 oz)   12/21/17 95.3 kg (210 lb)   11/29/17 95.3 kg (210 lb)              Today, you had the following     No orders found for display         Today's Medication Changes          These changes are accurate as of 2/28/18 11:59 PM.  If you have any questions, ask your nurse or doctor.               Start taking these medicines.        Dose/Directions    prednisoLONE acetate 1 % ophthalmic susp   Commonly known as:  PRED FORTE   Used for:  Postoperative eye state   Started by:  Tyson Ruby  MD French        Dose:  1 drop   Place 1 drop Into the left eye 3 times daily   Quantity:  1 Bottle   Refills:  0            Where to get your medicines      These medications were sent to [facility] [address] [facility] - [place], [place] - [address]  [address], [facility] [address]     Phone:  [phone]     prednisoLONE acetate 1 % ophthalmic susp                Primary Care Provider Office Phone # Fax #    Matilde Quiñonez, APRN [phone]       [address] [place] [address] [address]  [facility] [address]        Equal Access to Services     [facility]: Hadii aad ku hadasho Soomaali, waaxda luqadaha, qaybta kaalmada adeegyada, [address] hayjuan seals . So [facility] [phone].    ATENCIÓN: Si habla español, tiene a davis disposición servicios gratuitos de asistencia lingüística. [facility] [phone].    We comply with applicable federal civil rights laws and [place] laws. We do not discriminate on the basis of race, color, national origin, age, disability, sex, sexual orientation, or gender identity.            Thank you!     Thank you for choosing EYE CLINIC  for your care. Our goal is always to provide you with excellent care. Hearing back from our patients is one way we can continue to improve our services. Please take a few minutes to complete the written survey that you may receive in the mail after your visit with us. Thank you!             Your Updated Medication List - Protect others around you: Learn how to safely use, store and throw away your medicines at www.disposemymeds.org.          This list is accurate as of 2/28/18 11:59 PM.  Always use your most recent med list.                   Brand Name Dispense Instructions for use Diagnosis    * amLODIPine 5 MG tablet    NORVASC    90 tablet    Take 1 tablet (5 mg) by mouth At Bedtime    Essential hypertension, benign       * amLODIPine 10 MG tablet    NORVASC    90 tablet    Take 1 tablet (10 mg) by mouth At Bedtime AS  DIRECTED    Essential hypertension, benign       anastrozole 1 MG tablet    ARIMIDEX    90 tablet    Take 1 tablet (1 mg) by mouth daily    Malignant neoplasm of left breast in female, estrogen receptor positive, unspecified site of breast (H)       atorvastatin 40 MG tablet    LIPITOR    90 tablet    Take 1 tablet (40 mg) by mouth daily Please put on profile- pt just received 90-day supply of cholesterol meds    Pure hypercholesterolemia       * cholecalciferol 1000 UNIT tablet    vitamin D3    100 tablet    Take 1 tablet (1,000 Units) by mouth daily    Osteoporosis       * cholecalciferol 5000 UNITS Caps capsule    vitamin D3    120 capsule    Take 1 capsule (5,000 Units) by mouth daily    Vaginal dryness       CoQ10 200 MG Caps      Take 1 capsule by mouth daily        diclofenac 1 % Gel topical gel    VOLTAREN    100 g    Apply 4 grams to knees or 2 grams to hands four times daily using enclosed dosing card.    Primary osteoarthritis of both wrists       gabapentin 300 MG capsule    NEURONTIN    540 capsule    1-2 tablets EVERY 8 HOURS    Neuropathic pain       HYDROcodone-acetaminophen 5-325 MG per tablet    NORCO    40 tablet    Take 1 tablet by mouth every 6 hours as needed    Chronic left-sided low back pain, with sciatica presence unspecified       ketoconazole 2 % shampoo    NIZORAL    120 mL    Apply topically daily as needed for itching or irritation    Dermatitis, seborrheic       levothyroxine 112 MCG tablet    SYNTHROID/LEVOTHROID    45 tablet    Take 0.5 tab daily    Hypothyroidism, unspecified type       LORazepam 2 MG tablet    ATIVAN    30 tablet    Take 1 tablet at night for sleep    Sleep disorder       losartan 25 MG tablet    COZAAR    90 tablet    Take 1 tablet (25 mg) by mouth daily    Essential hypertension, benign       metoprolol succinate 25 MG 24 hr tablet    TOPROL-XL    90 tablet    Take 1 tablet (25 mg) by mouth daily    Irregular heart rate       Multi-vitamin Tabs tablet   Generic  drug:  multivitamin, therapeutic with minerals      Take 1 tablet by mouth daily.        OMEGA-3 FISH OIL PO      Take 1 capsule by mouth daily        omeprazole 20 MG CR capsule    priLOSEC     Take 20 mg by mouth daily        ondansetron 4 MG ODT tab    ZOFRAN-ODT    90 tablet    1-2 tabs po prn every 6 hours    Nausea       prednisoLONE acetate 1 % ophthalmic susp    PRED FORTE    1 Bottle    Place 1 drop Into the left eye 3 times daily    Postoperative eye state       REPHRESH Gel     14 Box    Place 2 g vaginally every 3 days    Urinary tract infection, site not specified       terbinafine 1 % cream    lamISIL AT    12 g    Apply topically 2 times daily For fungal infection not resolved with other antifungals (e.g. Clotrimazole)    Tinea pedis of both feet       triamcinolone 0.1 % ointment    KENALOG    30 g    Apply topically 2 times daily    Eczematous dermatitis       triamterene-hydrochlorothiazide 37.5-25 MG per tablet    MAXZIDE-25    90 tablet    Take 1 tablet by mouth daily    Essential hypertension, benign       * Notice:  This list has 4 medication(s) that are the same as other medications prescribed for you. Read the directions carefully, and ask your doctor or other care provider to review them with you.

## 2018-02-28 NOTE — PROGRESS NOTES
CC: s/p CE/IOL OS    HPI: 66 yo CF s/p CE/IOL OS. Surgery complicated by opening of RK wounds temporally. Resutured. Doing well, denies any pain, feels like vision is improving. No flashes, floaters, diplopia.    POHx:  H/o RK  S/p CE/IOL OS 2/20/18    Gtts:  PF QID OS  Ketorolac QID OS  Ofloxacin QID OS    Assessment / Plan:  1. S/p CE/IOL OS (POW#1)  - D/C BCL  - Continue prednisolone QID OS x 1week, then TID x 1week, then BID x 1week, then daily x 1week, then STOP  - D/C ketorolac QID OS  - D/C Ofloxacin QID OS  - eye shield at night  - return precautions discussed    RTC 3 weeks    ~~~~~~~~~~~~~~~~~~~~~~~~~~~~~~~~~~~~~~~~~~~~~~~~~~~~~~~~~~~~~~~~    Complete documentation of historical and exam elements from today's encounter can be found in the full encounter summary report (not reduplicated in this progress note). I personally obtained the chief complaint(s) and history of present illness.  I confirmed and edited as necessary the review of systems, past medical/surgical history, family history, social history, and examination findings as documented by others; and I examined the patient myself. I personally reviewed the relevant tests, images, and reports as documented above. I formulated and edited as necessary the assessment and plan and discussed the findings and management plan with the patient and family.    Tyson Ruby MD

## 2018-03-02 ENCOUNTER — MYC MEDICAL ADVICE (OUTPATIENT)
Dept: PHARMACY | Facility: CLINIC | Age: 68
End: 2018-03-02

## 2018-03-02 ASSESSMENT — EXTERNAL EXAM - RIGHT EYE: OD_EXAM: NORMAL

## 2018-03-02 ASSESSMENT — EXTERNAL EXAM - LEFT EYE: OS_EXAM: NORMAL

## 2018-03-02 ASSESSMENT — SLIT LAMP EXAM - LIDS
COMMENTS: NORMAL
COMMENTS: NORMAL

## 2018-03-09 ENCOUNTER — OFFICE VISIT (OUTPATIENT)
Dept: AUDIOLOGY | Facility: CLINIC | Age: 68
End: 2018-03-09
Payer: COMMERCIAL

## 2018-03-09 DIAGNOSIS — H90.3 SENSORINEURAL HEARING LOSS, BILATERAL: Primary | ICD-10-CM

## 2018-03-09 NOTE — MR AVS SNAPSHOT
After Visit Summary   3/9/2018    Nadira Perez    MRN: [id number]           Patient Information     Date Of Birth          1950        Visit Information        Provider Department      3/9/2018 9:00 AM Leyda Keyes AuD [facility] Audiology        Today's Diagnoses     Sensorineural hearing loss, bilateral    -  1       Follow-ups after your visit        Your next 10 appointments already scheduled     Mar 14, 2018  1:30 PM CDT   Post-Op with Tyson Ruby MD   Eye Clinic ([facility])    [address]  [facility] [address]   [phone]            Mar 21, 2018  1:00 PM CDT   Post-Op with Tyson Ruby MD   Eye Clinic ([facility])    [address]  [facility] [address]   [phone]            Apr 11, 2018  1:30 PM CDT   Post-Op with Tyson Ruby MD   Eye Clinic ([facility])    [address]  [facility] [address]   [phone]            May 14, 2018  9:30 AM CDT   [facility] Lab Draw with  MASONIC LAB DRAW   [facility] Lab Draw ([facility])    [address] [place] [address]  [address] [place] [address] [phone]            May 14, 2018 10:00 AM CDT   (Arrive by 9:45 AM)   Return Visit with Khushbu Louise MD   [facility] ([facility])    [address] [place] [address]  [address]  [facility] [address] [phone]            May 14, 2018 10:30 AM CDT   Infusion 60 with UC ONCOLOGY INFUSION, UC 31 ATC   [facility] ([facility])    [address] [place] [address]  [address] [place] [address] [phone]              Who to contact     Please call your clinic at [phone] to:    Ask questions about your health    Make or cancel appointments    Discuss your  medicines    Learn about your test results    Speak to your doctor            Additional Information About Your Visit        [employer] Information     Sh[employer] it! gives you secure access to your electronic health record. If you see a primary care provider, you can also send messages to your care team and make appointments. If you have questions, please call your primary care clinic.  If you do not have a primary care provider, please call [phone] and they will assist you.      Sh[employer] it! is an electronic gateway that provides easy, online access to your medical records. With [employer] it!, you can request a clinic appointment, read your test results, renew a prescription or communicate with your care team.     To access your existing account, please contact your [facility] or call [phone] for assistance.        Care EveryWhere ID     This is your Care EveryWhere ID. This could be used by other organizations to access your [place] medical records  [id number]         Blood Pressure from Last 3 Encounters:   02/15/18 134/85   11/29/17 134/75   11/21/17 134/81    Weight from Last 3 Encounters:   02/15/18 95.8 kg (211 lb 1.6 oz)   12/21/17 95.3 kg (210 lb)   11/29/17 95.3 kg (210 lb)              We Performed the Following     No Charge, Hearing Aid Clinic Visit        Primary Care Provider Office Phone # Fax #    Matilde BENSON MERCEDES Quiñonez [phone]       [address] [place] [address] [address]  [facility] [address]        Equal Access to Services     EDEL LAMA : Hadii aad ku hadasho Soomaali, waaxda luqadaha, qaybta kaalmada adeegyada, cierra seals . So [facility] [phone].    ATENCIÓN: Si habla español, tiene a davis disposición servicios gratuitos de asistencia lingüística. Llame al [phone].    We comply with applicable federal civil rights laws and [place] laws. We do not discriminate on the basis of race, color, national origin, age, disability, sex,  sexual orientation, or gender identity.            Thank you!     Thank you for choosing [facility] AUDIOLOGY  for your care. Our goal is always to provide you with excellent care. Hearing back from our patients is one way we can continue to improve our services. Please take a few minutes to complete the written survey that you may receive in the mail after your visit with us. Thank you!             Your Updated Medication List - Protect others around you: Learn how to safely use, store and throw away your medicines at www.disposemymeds.org.          This list is accurate as of 3/9/18 10:46 AM.  Always use your most recent med list.                   Brand Name Dispense Instructions for use Diagnosis    * amLODIPine 5 MG tablet    NORVASC    90 tablet    Take 1 tablet (5 mg) by mouth At Bedtime    Essential hypertension, benign       * amLODIPine 10 MG tablet    NORVASC    90 tablet    Take 1 tablet (10 mg) by mouth At Bedtime AS DIRECTED    Essential hypertension, benign       anastrozole 1 MG tablet    ARIMIDEX    90 tablet    Take 1 tablet (1 mg) by mouth daily    Malignant neoplasm of left breast in female, estrogen receptor positive, unspecified site of breast (H)       atorvastatin 40 MG tablet    LIPITOR    90 tablet    Take 1 tablet (40 mg) by mouth daily Please put on profile- pt just received 90-day supply of cholesterol meds    Pure hypercholesterolemia       * cholecalciferol 1000 UNIT tablet    vitamin D3    100 tablet    Take 1 tablet (1,000 Units) by mouth daily    Osteoporosis       * cholecalciferol 5000 UNITS Caps capsule    vitamin D3    120 capsule    Take 1 capsule (5,000 Units) by mouth daily    Vaginal dryness       CoQ10 200 MG Caps      Take 1 capsule by mouth daily        diclofenac 1 % Gel topical gel    VOLTAREN    100 g    Apply 4 grams to knees or 2 grams to hands four times daily using enclosed dosing card.    Primary osteoarthritis of both wrists       gabapentin 300 MG capsule     NEURONTIN    540 capsule    1-2 tablets EVERY 8 HOURS    Neuropathic pain       HYDROcodone-acetaminophen 5-325 MG per tablet    NORCO    40 tablet    Take 1 tablet by mouth every 6 hours as needed    Chronic left-sided low back pain, with sciatica presence unspecified       ketoconazole 2 % shampoo    NIZORAL    120 mL    Apply topically daily as needed for itching or irritation    Dermatitis, seborrheic       levothyroxine 112 MCG tablet    SYNTHROID/LEVOTHROID    45 tablet    Take 0.5 tab daily    Hypothyroidism, unspecified type       LORazepam 2 MG tablet    ATIVAN    30 tablet    Take 1 tablet at night for sleep    Sleep disorder       losartan 25 MG tablet    COZAAR    90 tablet    Take 1 tablet (25 mg) by mouth daily    Essential hypertension, benign       metoprolol succinate 25 MG 24 hr tablet    TOPROL-XL    90 tablet    Take 1 tablet (25 mg) by mouth daily    Irregular heart rate       Multi-vitamin Tabs tablet   Generic drug:  multivitamin, therapeutic with minerals      Take 1 tablet by mouth daily.        OMEGA-3 FISH OIL PO      Take 1 capsule by mouth daily        omeprazole 20 MG CR capsule    priLOSEC     Take 20 mg by mouth daily        ondansetron 4 MG ODT tab    ZOFRAN-ODT    90 tablet    1-2 tabs po prn every 6 hours    Nausea       prednisoLONE acetate 1 % ophthalmic susp    PRED FORTE    1 Bottle    Place 1 drop Into the left eye 3 times daily    Postoperative eye state       REPHRESH Gel     14 Box    Place 2 g vaginally every 3 days    Urinary tract infection, site not specified       terbinafine 1 % cream    lamISIL AT    12 g    Apply topically 2 times daily For fungal infection not resolved with other antifungals (e.g. Clotrimazole)    Tinea pedis of both feet       triamcinolone 0.1 % ointment    KENALOG    30 g    Apply topically 2 times daily    Eczematous dermatitis       triamterene-hydrochlorothiazide 37.5-25 MG per tablet    MAXZIDE-25    90 tablet    Take 1 tablet by mouth daily     Essential hypertension, benign       * Notice:  This list has 4 medication(s) that are the same as other medications prescribed for you. Read the directions carefully, and ask your doctor or other care provider to review them with you.

## 2018-03-09 NOTE — PROGRESS NOTES
"AUDIOLOGY REPORT    SUBJECTIVE:Nadira Perez is a 67 year old female who was seen in the Audiology Clinic at the [facility] on 3/9/2018  for a follow-up check regarding the fitting of new hearing aids. Previous results have revealed a bilateral sensorineural hearing loss.  The patient has been seen previously in this clinic and was fit with bilateral Widex Beyond Fusion 330 on 2/5/2018.  Nadira reports that she is still unsure if she wants to keep the hearing aids.  She reports that she feels like she is not seeing benefit but knows that she has been able to turn the television volume down. She reported that she did not wear the hearing aids for 4 days so that she could see if she saw benefit when she put the hearing aids back on, but stated she did not really see an improvement.  \"Soft background sounds\" are still bothersome.     OBJECTIVE:   Data logging revealed that the hearing aids were only worn an average of 2 hours per day. Data logging also revealed that the preference control was used to turn the volume down every time the hearing aids were used.    Based on patient report, the following changes were made; hearing aids were changed from more audibility to more comfort. The preference control was also deactivated again.    Patient was counseled extensively regarding wearing the hearing aids full time.  It was discussed that she will best be able to decide if she likes the hearing aids if she wears them for 8 hours a day, every day.  The pros and cons regarding hearing aid use were reviewed again. She expressed understanding.     The end of the 45 days was reviewed.     ASSESSMENT: A follow-up appointment for hearing aid fitting was completed today. Changes to hearing aid was completed as outlined above.     PLAN: Nadira will return for follow-up as needed, or at least every 6-9 months for cleaning and assessment of hearing aid.  She will contact the clinic prior to " 3/22/2018 if she decides that she would like to return the hearing aids. Please call this clinic with any questions regarding today s appointment.    Rik Arauz  Audiologist  [place] License  #[id number]

## 2018-03-13 ENCOUNTER — TRANSFERRED RECORDS (OUTPATIENT)
Dept: HEALTH INFORMATION MANAGEMENT | Facility: CLINIC | Age: 68
End: 2018-03-13

## 2018-03-14 ENCOUNTER — OFFICE VISIT (OUTPATIENT)
Dept: AUDIOLOGY | Facility: CLINIC | Age: 68
End: 2018-03-14
Payer: COMMERCIAL

## 2018-03-14 ENCOUNTER — OFFICE VISIT (OUTPATIENT)
Dept: OPHTHALMOLOGY | Facility: CLINIC | Age: 68
End: 2018-03-14
Attending: OPHTHALMOLOGY
Payer: MEDICARE

## 2018-03-14 DIAGNOSIS — H17.9 CORNEAL SCARS, BOTH EYES: ICD-10-CM

## 2018-03-14 DIAGNOSIS — Z96.1 PSEUDOPHAKIA: ICD-10-CM

## 2018-03-14 DIAGNOSIS — H90.3 SENSORY HEARING LOSS, BILATERAL: Primary | ICD-10-CM

## 2018-03-14 DIAGNOSIS — Z98.890 POSTOPERATIVE EYE STATE: Primary | ICD-10-CM

## 2018-03-14 DIAGNOSIS — H18.529 ANTERIOR BASEMENT MEMBRANE DYSTROPHY: ICD-10-CM

## 2018-03-14 PROCEDURE — G0463 HOSPITAL OUTPT CLINIC VISIT: HCPCS | Mod: ZF

## 2018-03-14 ASSESSMENT — CONF VISUAL FIELD
OS_NORMAL: 1
OD_NORMAL: 1
METHOD: COUNTING FINGERS

## 2018-03-14 ASSESSMENT — SLIT LAMP EXAM - LIDS
COMMENTS: NORMAL
COMMENTS: NORMAL

## 2018-03-14 ASSESSMENT — VISUAL ACUITY
OD_PH_SC: 20/30-2
OD_SC+: +2
OD_SC: 20/40
OS_PH_SC: 20/30-2
OS_SC+: +2
OS_SC: 20/60
METHOD: SNELLEN - LINEAR

## 2018-03-14 ASSESSMENT — REFRACTION_WEARINGRX
OS_CYLINDER: +2.00
OS_ADD: +2.25
OD_SPHERE: +7.25
OD_AXIS: 005
SPECS_TYPE: PAL
OS_AXIS: 013
OD_CYLINDER: +1.75
OD_ADD: +2.25
OS_SPHERE: +6.75

## 2018-03-14 ASSESSMENT — EXTERNAL EXAM - RIGHT EYE: OD_EXAM: NORMAL

## 2018-03-14 ASSESSMENT — TONOMETRY
OS_IOP_MMHG: 18
OD_IOP_MMHG: 13
IOP_METHOD: ICARE

## 2018-03-14 ASSESSMENT — EXTERNAL EXAM - LEFT EYE: OS_EXAM: NORMAL

## 2018-03-14 NOTE — PROGRESS NOTES
.AUDIOLOGY REPORT    SUBJECTIVE:Nadira Perez is a 67 year old female who was seen in the Audiology Clinic at the [facility] on 3/14/2018  for a follow-up check regarding the fitting of new hearing aids. Previous results have revealed a bilateral sensorineural hearing loss.  The patient has been seen previously in this clinic and was fit with bilateral Widex Beyond Fusion 330 on 2/5/2018.  Nadira reports that she is here to have her hearing aids reprogrammed to her previous setting as she preferred the volume and sound quality of those settings. She does also report she is wearing them more often.    OBJECTIVE:   Programmed patient to previous settings. Patient really wanted her preference control back, so I did activate it. However we did discuss the importance of not constantly turning it down, and she does seem to understand.    The end of the 45 days was reviewed.No charge visit.     ASSESSMENT: A follow-up appointment for hearing aid fitting was completed today. Changes to hearing aid was completed as outlined above.     PLAN: Nadira will return for follow-up as needed, or at least every 6-9 months for cleaning and assessment of hearing aid.  She will contact the clinic prior to 3/22/2018 if she decides that she would like to return the hearing aids. Please call this clinic with any questions regarding today s appointment.        Nikhil Mckay, [facility]  Licensed Audiologist  [place] #[id number]

## 2018-03-14 NOTE — PROGRESS NOTES
CC: s/p CE/IOL OU    HPI: 66 yo CF s/p CE/IOL OS. Surgery complicated by opening of RK wounds temporally. Resutured. Doing well, denies any pain, feels like vision is improving. No flashes, floaters, diplopia.    POHx:  H/o RK  S/p CE/IOL OS 2/20/18  S/p CE/IOL OD 3/13/18    Gtts:  PF QID OD  Ketorolac QID OD  Ofloxacin QID OD  Prednisolone BID OS      Assessment / Plan:    1. S/p CE/IOL OS (POW#2)  - D/C BCL  - Continue prednisolone QID OS x 1week, then TID x 1week, then BID x 1week, then daily x 1week, then STOP    2. S/p Cataract extraction/IOL OD Postoperative day#1  Doing great  Start Pred QID  Start Oflox four times a day   Start ketorolac four times a day    [place] at bedtime,   Post-op restrictions reviewed    RTC 1 week    Lucas Madison, DO  Cornea Fellow    ~~~~~~~~~~~~~~~~~~~~~~~~~~~~~~~~~~~~~~~~~~~~~~~~~~~~~~~~~~~~~~~~    Complete documentation of historical and exam elements from today's encounter can be found in the full encounter summary report (not reduplicated in this progress note). I personally obtained the chief complaint(s) and history of present illness.  I confirmed and edited as necessary the review of systems, past medical/surgical history, family history, social history, and examination findings as documented by others; and I examined the patient myself. I personally reviewed the relevant tests, images, and reports as documented above. I formulated and edited as necessary the assessment and plan and discussed the findings and management plan with the patient and family.    Tyson Ruby MD

## 2018-03-14 NOTE — MR AVS SNAPSHOT
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      Your next 10 appointments already scheduled     Mar 21, 2018  1:00 PM CDT   Post-Op with Tyson Ruby MD   Eye Clinic ([facility])    [address]  [facility] [address]   [phone]            Apr 11, 2018  1:30 PM CDT   Post-Op with Tyson Ruby MD   Eye Clinic ([facility])    [address]  [facility] [address] [phone]            May 14, 2018  9:30 AM CDT   [facility] Lab Draw with  [facility] LAB DRAW   [facility])    [address]  [address]  [facility] [address] [phone]            May 14, 2018 10:00 AM CDT   (Arrive by 9:45 AM)   Return Visit with Khushbu Louise MD   [facility] ([facility])    [address]  [address]  [facility] [address] [phone]            May 14, 2018 10:30 AM CDT   Infusion 60 with UC ONCOLOGY INFUSION, UC 31 ATC   [facility] ([facility])    [address]  [address]  [facility] [address] [phone]              Who to contact     Please call your clinic at [phone] to:    Ask questions about your health    Make or cancel appointments    Discuss your medicines    Learn about your test  results    Speak to your doctor            Additional Information About Your Visit        [employer] Information     [employer] gives you secure access to your electronic health record. If you see a primary care provider, you can also send messages to your care team and make appointments. If you have questions, please call your primary care clinic.  If you do not have a primary care provider, please call [phone] and they will assist you.      [employer] is an electronic gateway that provides easy, online access to your medical records. With [employer], you can request a clinic appointment, read your test results, renew a prescription or communicate with your care team.     To access your existing account, please contact your [facility] or call [phone] for assistance.        Care EveryWhere ID     This is your Care EveryWhere ID. This could be used by other organizations to access your [place] medical records  [id number]         Blood Pressure from Last 3 Encounters:   02/15/18 134/85   11/29/17 134/75   11/21/17 134/81    Weight from Last 3 Encounters:   02/15/18 95.8 kg (211 lb 1.6 oz)   12/21/17 95.3 kg (210 lb)   11/29/17 95.3 kg (210 lb)              Today, you had the following     No orders found for display       Primary Care Provider Office Phone # Fax #    Matilde BENSON Olamide, APRN [phone]       [address] [place] [address]  [facility] [address]        Equal Access to Services     EDEL LAMA : Hadii jacinta ku hadasho Solingali, waaxda luqadaha, qaybta kaalmada adeegyada, cierra seals . So [facility] [phone].    ATENCIÓN: Si habla español, tiene a davis disposición servicios gratuitos de asistencia lingüística. Llame al [phone].    We comply with applicable federal civil rights laws and [place] laws. We do not discriminate on the basis of race, color, national origin, age, disability, sex, sexual orientation, or gender  identity.            Thank you!     Thank you for choosing EYE CLINIC  for your care. Our goal is always to provide you with excellent care. Hearing back from our patients is one way we can continue to improve our services. Please take a few minutes to complete the written survey that you may receive in the mail after your visit with us. Thank you!             Your Updated Medication List - Protect others around you: Learn how to safely use, store and throw away your medicines at www.disposemymeds.org.          This list is accurate as of 3/14/18 11:59 PM.  Always use your most recent med list.                   Brand Name Dispense Instructions for use Diagnosis    * amLODIPine 5 MG tablet    NORVASC    90 tablet    Take 1 tablet (5 mg) by mouth At Bedtime    Essential hypertension, benign       * amLODIPine 10 MG tablet    NORVASC    90 tablet    Take 1 tablet (10 mg) by mouth At Bedtime AS DIRECTED    Essential hypertension, benign       anastrozole 1 MG tablet    ARIMIDEX    90 tablet    Take 1 tablet (1 mg) by mouth daily    Malignant neoplasm of left breast in female, estrogen receptor positive, unspecified site of breast (H)       atorvastatin 40 MG tablet    LIPITOR    90 tablet    Take 1 tablet (40 mg) by mouth daily Please put on profile- pt just received 90-day supply of cholesterol meds    Pure hypercholesterolemia       * cholecalciferol 1000 UNIT tablet    vitamin D3    100 tablet    Take 1 tablet (1,000 Units) by mouth daily    Osteoporosis       * cholecalciferol 5000 UNITS Caps capsule    vitamin D3    120 capsule    Take 1 capsule (5,000 Units) by mouth daily    Vaginal dryness       CoQ10 200 MG Caps      Take 1 capsule by mouth daily        diclofenac 1 % Gel topical gel    VOLTAREN    100 g    Apply 4 grams to knees or 2 grams to hands four times daily using enclosed dosing card.    Primary osteoarthritis of both wrists       gabapentin 300 MG capsule    NEURONTIN    540 capsule    1-2 tablets  EVERY 8 HOURS    Neuropathic pain       HYDROcodone-acetaminophen 5-325 MG per tablet    NORCO    40 tablet    Take 1 tablet by mouth every 6 hours as needed    Chronic left-sided low back pain, with sciatica presence unspecified       ketoconazole 2 % shampoo    NIZORAL    120 mL    Apply topically daily as needed for itching or irritation    Dermatitis, seborrheic       levothyroxine 112 MCG tablet    SYNTHROID/LEVOTHROID    45 tablet    Take 0.5 tab daily    Hypothyroidism, unspecified type       LORazepam 2 MG tablet    ATIVAN    30 tablet    Take 1 tablet at night for sleep    Sleep disorder       losartan 25 MG tablet    COZAAR    90 tablet    Take 1 tablet (25 mg) by mouth daily    Essential hypertension, benign       metoprolol succinate 25 MG 24 hr tablet    TOPROL-XL    90 tablet    Take 1 tablet (25 mg) by mouth daily    Irregular heart rate       Multi-vitamin Tabs tablet   Generic drug:  multivitamin, therapeutic with minerals      Take 1 tablet by mouth daily.        OMEGA-3 FISH OIL PO      Take 1 capsule by mouth daily        omeprazole 20 MG CR capsule    priLOSEC     Take 20 mg by mouth daily        ondansetron 4 MG ODT tab    ZOFRAN-ODT    90 tablet    1-2 tabs po prn every 6 hours    Nausea       prednisoLONE acetate 1 % ophthalmic susp    PRED FORTE    1 Bottle    Place 1 drop Into the left eye 3 times daily    Postoperative eye state       REPHRESH Gel     14 Box    Place 2 g vaginally every 3 days    Urinary tract infection, site not specified       terbinafine 1 % cream    lamISIL AT    12 g    Apply topically 2 times daily For fungal infection not resolved with other antifungals (e.g. Clotrimazole)    Tinea pedis of both feet       triamcinolone 0.1 % ointment    KENALOG    30 g    Apply topically 2 times daily    Eczematous dermatitis       triamterene-hydrochlorothiazide 37.5-25 MG per tablet    MAXZIDE-25    90 tablet    Take 1 tablet by mouth daily    Essential hypertension, benign        * Notice:  This list has 4 medication(s) that are the same as other medications prescribed for you. Read the directions carefully, and ask your doctor or other care provider to review them with you.

## 2018-03-14 NOTE — MR AVS SNAPSHOT
After Visit Summary   3/14/2018    Nadira Perez    MRN: [id number]           Patient Information     Date Of Birth          1950        Visit Information        Provider Department      3/14/2018 3:00 PM Rebecca Masters AuD [facility] Audiology        Today's Diagnoses     Sensory hearing loss, bilateral    -  1       Follow-ups after your visit        Your next 10 appointments already scheduled     Mar 21, 2018  1:00 PM CDT   Post-Op with Tyson Ruby MD   Eye Clinic ([facility])    [address]  [facility] [address]   [phone]            Apr 11, 2018  1:30 PM CDT   Post-Op with Tyson Ruby MD   Eye Clinic ([facility])    [address]  [facility] [address]   [phone]            May 14, 2018  9:30 AM CDT   [facility] Lab Draw with  MASONIC LAB DRAW   [facility] Draw ([facility])    [address]  [address]  [facility] [address] [phone]            May 14, 2018 10:00 AM CDT   (Arrive by 9:45 AM)   Return Visit with Khushub Louise MD   [facility] ([facility])    [address]  [address]  [facility] [address] [phone]            May 14, 2018 10:30 AM CDT   Infusion 60 with  ONCOLOGY INFUSION, UC 31 ATC   [facility] ([facility])    [address] [place] [address]  [address]  [facility] [address] [phone]              Who to contact     Please call your clinic at [phone] to:    Ask questions about your health    Make or cancel appointments    Discuss your medicines    Learn about your test results    Speak to your doctor            Additional Information About Your Visit        MyChart Information     MyChart gives you secure access to your electronic health record. If you see a  primary care provider, you can also send messages to your care team and make appointments. If you have questions, please call your primary care clinic.  If you do not have a primary care provider, please call [phone] and they will assist you.      [employer] is an electronic gateway that provides easy, online access to your medical records. With [employer], you can request a clinic appointment, read your test results, renew a prescription or communicate with your care team.     To access your existing account, please contact your [facility] or call [phone] for assistance.        Care EveryWhere ID     This is your Care EveryWhere ID. This could be used by other organizations to access your [place] medical records  [id number]         Blood Pressure from Last 3 Encounters:   02/15/18 134/85   11/29/17 134/75   11/21/17 134/81    Weight from Last 3 Encounters:   02/15/18 95.8 kg (211 lb 1.6 oz)   12/21/17 95.3 kg (210 lb)   11/29/17 95.3 kg (210 lb)              We Performed the Following     No Charge, Hearing Aid Clinic Visit        Primary Care Provider Office Phone # Fax #    Matilde BENSON Olamide, APRN [phone]       [address] [place] [address]        Equal Access to Services     EDEL LAMA : Hadii jacinta ku hadasho Soomaali, waaxda luqadaha, qaybta kaalmada adeegyada, waxmoon noin hayjuan seals . So [facility] [phone].    ATENCIÓN: Si habla español, tiene a davis disposición servicios gratuitos de asistencia lingüística. Llame al [phone].    We comply with applicable federal civil rights laws and [place] laws. We do not discriminate on the basis of race, color, national origin, age, disability, sex, sexual orientation, or gender identity.            Thank you!     Thank you for choosing [facility] AUDIOLOGY  for your care. Our goal is always to provide you with excellent care. Hearing back from our patients is one way we can  continue to improve our services. Please take a few minutes to complete the written survey that you may receive in the mail after your visit with us. Thank you!             Your Updated Medication List - Protect others around you: Learn how to safely use, store and throw away your medicines at www.disposemymeds.org.          This list is accurate as of 3/14/18  4:30 PM.  Always use your most recent med list.                   Brand Name Dispense Instructions for use Diagnosis    * amLODIPine 5 MG tablet    NORVASC    90 tablet    Take 1 tablet (5 mg) by mouth At Bedtime    Essential hypertension, benign       * amLODIPine 10 MG tablet    NORVASC    90 tablet    Take 1 tablet (10 mg) by mouth At Bedtime AS DIRECTED    Essential hypertension, benign       anastrozole 1 MG tablet    ARIMIDEX    90 tablet    Take 1 tablet (1 mg) by mouth daily    Malignant neoplasm of left breast in female, estrogen receptor positive, unspecified site of breast (H)       atorvastatin 40 MG tablet    LIPITOR    90 tablet    Take 1 tablet (40 mg) by mouth daily Please put on profile- pt just received 90-day supply of cholesterol meds    Pure hypercholesterolemia       * cholecalciferol 1000 UNIT tablet    vitamin D3    100 tablet    Take 1 tablet (1,000 Units) by mouth daily    Osteoporosis       * cholecalciferol 5000 UNITS Caps capsule    vitamin D3    120 capsule    Take 1 capsule (5,000 Units) by mouth daily    Vaginal dryness       CoQ10 200 MG Caps      Take 1 capsule by mouth daily        diclofenac 1 % Gel topical gel    VOLTAREN    100 g    Apply 4 grams to knees or 2 grams to hands four times daily using enclosed dosing card.    Primary osteoarthritis of both wrists       gabapentin 300 MG capsule    NEURONTIN    540 capsule    1-2 tablets EVERY 8 HOURS    Neuropathic pain       HYDROcodone-acetaminophen 5-325 MG per tablet    NORCO    40 tablet    Take 1 tablet by mouth every 6 hours as needed    Chronic left-sided low back  pain, with sciatica presence unspecified       ketoconazole 2 % shampoo    NIZORAL    120 mL    Apply topically daily as needed for itching or irritation    Dermatitis, seborrheic       levothyroxine 112 MCG tablet    SYNTHROID/LEVOTHROID    45 tablet    Take 0.5 tab daily    Hypothyroidism, unspecified type       LORazepam 2 MG tablet    ATIVAN    30 tablet    Take 1 tablet at night for sleep    Sleep disorder       losartan 25 MG tablet    COZAAR    90 tablet    Take 1 tablet (25 mg) by mouth daily    Essential hypertension, benign       metoprolol succinate 25 MG 24 hr tablet    TOPROL-XL    90 tablet    Take 1 tablet (25 mg) by mouth daily    Irregular heart rate       Multi-vitamin Tabs tablet   Generic drug:  multivitamin, therapeutic with minerals      Take 1 tablet by mouth daily.        OMEGA-3 FISH OIL PO      Take 1 capsule by mouth daily        omeprazole 20 MG CR capsule    priLOSEC     Take 20 mg by mouth daily        ondansetron 4 MG ODT tab    ZOFRAN-ODT    90 tablet    1-2 tabs po prn every 6 hours    Nausea       prednisoLONE acetate 1 % ophthalmic susp    PRED FORTE    1 Bottle    Place 1 drop Into the left eye 3 times daily    Postoperative eye state       REPHRESH Gel     14 Box    Place 2 g vaginally every 3 days    Urinary tract infection, site not specified       terbinafine 1 % cream    lamISIL AT    12 g    Apply topically 2 times daily For fungal infection not resolved with other antifungals (e.g. Clotrimazole)    Tinea pedis of both feet       triamcinolone 0.1 % ointment    KENALOG    30 g    Apply topically 2 times daily    Eczematous dermatitis       triamterene-hydrochlorothiazide 37.5-25 MG per tablet    MAXZIDE-25    90 tablet    Take 1 tablet by mouth daily    Essential hypertension, benign       * Notice:  This list has 4 medication(s) that are the same as other medications prescribed for you. Read the directions carefully, and ask your doctor or other care provider to review them  with you.

## 2018-03-14 NOTE — NURSING NOTE
Chief Complaints and History of Present Illnesses   Patient presents with     Post Op (Ophthalmology) Right Eye     POD #1 s/p CE/IOL Right Eye 03/14/2018     HPI    Affected eye(s):  Right   Symptoms:     No floaters   No flashes   No redness   No tearing   No Dryness   No itching         Do you have eye pain now?:  No      Comments:  Pt slept well last night. Some soreness last night, relief with tylenol.     Hina BELLAMY March 14, 2018 1:41 PM

## 2018-03-21 ENCOUNTER — OFFICE VISIT (OUTPATIENT)
Dept: OPHTHALMOLOGY | Facility: CLINIC | Age: 68
End: 2018-03-21
Attending: OPHTHALMOLOGY
Payer: MEDICARE

## 2018-03-21 DIAGNOSIS — Z98.890 POSTOPERATIVE EYE STATE: Primary | ICD-10-CM

## 2018-03-21 DIAGNOSIS — Z96.1 PSEUDOPHAKIA: ICD-10-CM

## 2018-03-21 PROCEDURE — G0463 HOSPITAL OUTPT CLINIC VISIT: HCPCS | Mod: ZF

## 2018-03-21 RX ORDER — PREDNISOLONE ACETATE 10 MG/ML
1 SUSPENSION/ DROPS OPHTHALMIC 3 TIMES DAILY
Qty: 1 BOTTLE | Refills: 0 | Status: SHIPPED | OUTPATIENT
Start: 2018-03-21 | End: 2018-07-24

## 2018-03-21 ASSESSMENT — TONOMETRY
OD_IOP_MMHG: 15
OS_IOP_MMHG: 12
IOP_METHOD: ICARE

## 2018-03-21 ASSESSMENT — VISUAL ACUITY
OS_SC+: -2
OD_SC: 20/30
OS_SC: 20/40
METHOD: SNELLEN - LINEAR
OS_PH_SC: 20/30-2

## 2018-03-21 ASSESSMENT — EXTERNAL EXAM - LEFT EYE: OS_EXAM: NORMAL

## 2018-03-21 ASSESSMENT — EXTERNAL EXAM - RIGHT EYE: OD_EXAM: NORMAL

## 2018-03-21 ASSESSMENT — SLIT LAMP EXAM - LIDS
COMMENTS: NORMAL
COMMENTS: NORMAL

## 2018-03-21 NOTE — PROGRESS NOTES
CC: s/p CE/IOL OU    HPI: 66 yo CF s/p CE/IOL OS. Surgery complicated by opening of RK wounds temporally. Resutured. Doing well, denies any pain, feels like vision is improving. No flashes, diplopia. She has noticed more floaters OD    POHx:  H/o RK  S/p CE/IOL OS 2/20/18  S/p CE/IOL OD 3/13/18    Gtts:  PF QID OD  Ketorolac QID OD  Ofloxacin QID OD  Prednisolone BID OS      Assessment / Plan:    1. S/p CE/IOL OS (POW#2)  - D/C BCL  - Continue prednisolone daily x 1week, then STOP    2. S/p Cataract extraction/IOL OD Postoperative week#1  Doing great  Taper prednisolone TID x 1weeks, BID x 1weeks, then daily x 1week, then STOP  STOP Oflox   STOP ketorolac     RTC 3 weeks, remove suture OD then MRx    Lucas Madison, DO  Cornea Fellow    ~~~~~~~~~~~~~~~~~~~~~~~~~~~~~~~~~~~~~~~~~~~~~~~~~~~~~~~~~~~~~~~~    Complete documentation of historical and exam elements from today's encounter can be found in the full encounter summary report (not reduplicated in this progress note). I personally obtained the chief complaint(s) and history of present illness.  I confirmed and edited as necessary the review of systems, past medical/surgical history, family history, social history, and examination findings as documented by others; and I examined the patient myself. I personally reviewed the relevant tests, images, and reports as documented above. I formulated and edited as necessary the assessment and plan and discussed the findings and management plan with the patient and family.    Tyson Ruby MD

## 2018-03-21 NOTE — NURSING NOTE
"Chief Complaints and History of Present Illnesses   Patient presents with     Post Op (Ophthalmology) Both Eyes     1mo post op Cataract sx OS/ 1wk cataract sx OD     HPI    Affected eye(s):  Both   Symptoms:     No blurred vision   No decreased vision   No flashes   No redness         Do you have eye pain now?:  No      Comments:  1mo post op Cataract sx OS/ 1wk cataract sx OD  Cataract sx: OD (3/13/18) OS (2/20/18)    VA hasn't changed since LV. Has been wearing +2.50 for intermediate/near VA.     Some new Floaters OU, \"believes these are new since the sx,\" h/o floaters in the past.   No Flashes since LV.     Ocular meds:  Pred TID OS/QID OD, Oculflox QID OD, Ketorolac QID OD    Patient reports compliancy with gtts schedule, no side effects.   Ruma Coto COT 1:51 PM March 21, 2018                "

## 2018-03-21 NOTE — PATIENT INSTRUCTIONS
Right eye:  Prednisolone (PINK Top) 3 times a day for 1week, then two times per day for 1 week, then once a day for 1 week, then STOP  STOP ketorolac (GREY Top)  STOP ofloxacin (TAN Top)    Left eye:  Taper prednisolone to once a day x 1 week, STOP

## 2018-03-21 NOTE — MR AVS SNAPSHOT
After Visit Summary   3/21/2018    Nadira Perez    MRN: [id number]           Patient Information     Date Of Birth          1950        Visit Information        Provider Department      3/21/2018 1:00 PM Tyson Ruby MD [facility]        Today's Diagnoses     Postoperative eye state    -  1    Pseudophakia - Left Eye          Care Instructions    Right eye:  Prednisolone (PINK Top) 3 times a day for 1week, then two times per day for 1 week, then once a day for 1 week, then STOP  STOP ketorolac (GREY Top)  STOP ofloxacin (TAN Top)    Left eye:  Taper prednisolone to once a day x 1 week, STOP          Follow-ups after your visit        Follow-up notes from your care team     Return in about 3 weeks (around 4/11/2018).      Your next 10 appointments already scheduled     Apr 11, 2018  1:30 PM CDT   Post-Op with Tyson Ruby MD   Eye Clinic ([facility])    [address]  [facility] [address]   [phone]            May 14, 2018  9:30 AM CDT   M[facility]ic Lab Draw with  MASONIC LAB DRAW   [facility] Lab Draw ([facility])    [address]  [address]  [facility] [address] [phone]            May 14, 2018 10:00 AM CDT   (Arrive by 9:45 AM)   Return Visit with Khushbu Louise MD   [facility] ([facility])    [address]  [address]  [facility] [address] [phone]            May 14, 2018 10:30 AM CDT   Infusion 60 with UC ONCOLOGY INFUSION, UC 31 ATC   [facility] ([facility])    [address] [place] [address]  [address]  [facility] [address] [phone]              Who to contact     Please call your clinic at [phone] to:    Ask questions about your health    Make or cancel appointments    Discuss your medicines    Learn about your test results    Speak to your  doctor            Additional Information About Your Visit        Matternethart Information     OpenLabel gives you secure access to your electronic health record. If you see a primary care provider, you can also send messages to your care team and make appointments. If you have questions, please call your primary care clinic.  If you do not have a primary care provider, please call [phone] and they will assist you.      OpenLabel is an electronic gateway that provides easy, online access to your medical records. With OpenLabel, you can request a clinic appointment, read your test results, renew a prescription or communicate with your care team.     To access your existing account, please contact your [facility] or call [phone] for assistance.        Care EveryWhere ID     This is your Care EveryWhere ID. This could be used by other organizations to access your [place] medical records  [id number]         Blood Pressure from Last 3 Encounters:   02/15/18 134/85   11/29/17 134/75   11/21/17 134/81    Weight from Last 3 Encounters:   02/15/18 95.8 kg (211 lb 1.6 oz)   12/21/17 95.3 kg (210 lb)   11/29/17 95.3 kg (210 lb)              Today, you had the following     No orders found for display         Today's Medication Changes          These changes are accurate as of 3/21/18  3:47 PM.  If you have any questions, ask your nurse or doctor.               These medicines have changed or have updated prescriptions.        Dose/Directions    * prednisoLONE acetate 1 % ophthalmic susp   Commonly known as:  PRED FORTE   This may have changed:  Another medication with the same name was added. Make sure you understand how and when to take each.   Used for:  Postoperative eye state   Changed by:  Tyson Ruby MD        Dose:  1 drop   Place 1 drop Into the left eye 3 times daily   Quantity:  1 Bottle   Refills:  0       * prednisoLONE acetate 1 % ophthalmic susp   Commonly known as:  PRED  FORTE   This may have changed:  You were already taking a medication with the same name, and this prescription was added. Make sure you understand how and when to take each.   Used for:  Postoperative eye state   Changed by:  Tyson Ruby MD        Dose:  1 drop   Place 1 drop into the right eye 3 times daily   Quantity:  1 Bottle   Refills:  0       * Notice:  This list has 2 medication(s) that are the same as other medications prescribed for you. Read the directions carefully, and ask your doctor or other care provider to review them with you.         Where to get your medicines      These medications were sent to [place] [facility] [address] [facility] - [place], [place] - [address]  [address], [facility] [address]     Phone:  [phone]     prednisoLONE acetate 1 % ophthalmic susp                Primary Care Provider Office Phone # Fax #    MERCEDES Rivera [phone]       [address] [place] [address]  [facility] [address]        Equal Access to Services     EDEL LAMA AH: Hadii jacinta ku hadasho Soomaali, waaxda luqadaha, qaybta kaalmada adeegyada, waxay onealin hayjuan seals . So [facility] [phone].    ATENCIÓN: Si adrián espfeng, tiene a davis disposición servicios gratuitos de asistencia lingüística. Llame al [phone].    We comply with applicable federal civil rights laws and [place] laws. We do not discriminate on the basis of race, color, national origin, age, disability, sex, sexual orientation, or gender identity.            Thank you!     Thank you for choosing EYE CLINIC  for your care. Our goal is always to provide you with excellent care. Hearing back from our patients is one way we can continue to improve our services. Please take a few minutes to complete the written survey that you may receive in the mail after your visit with us. Thank you!             Your Updated Medication List - Protect others around you: Learn how to safely use, store and throw  away your medicines at www.disposemymeds.org.          This list is accurate as of 3/21/18  3:47 PM.  Always use your most recent med list.                   Brand Name Dispense Instructions for use Diagnosis    * amLODIPine 5 MG tablet    NORVASC    90 tablet    Take 1 tablet (5 mg) by mouth At Bedtime    Essential hypertension, benign       * amLODIPine 10 MG tablet    NORVASC    90 tablet    Take 1 tablet (10 mg) by mouth At Bedtime AS DIRECTED    Essential hypertension, benign       anastrozole 1 MG tablet    ARIMIDEX    90 tablet    Take 1 tablet (1 mg) by mouth daily    Malignant neoplasm of left breast in female, estrogen receptor positive, unspecified site of breast (H)       atorvastatin 40 MG tablet    LIPITOR    90 tablet    Take 1 tablet (40 mg) by mouth daily Please put on profile- pt just received 90-day supply of cholesterol meds    Pure hypercholesterolemia       * cholecalciferol 1000 UNIT tablet    vitamin D3    100 tablet    Take 1 tablet (1,000 Units) by mouth daily    Osteoporosis       * cholecalciferol 5000 UNITS Caps capsule    vitamin D3    120 capsule    Take 1 capsule (5,000 Units) by mouth daily    Vaginal dryness       CoQ10 200 MG Caps      Take 1 capsule by mouth daily        diclofenac 1 % Gel topical gel    VOLTAREN    100 g    Apply 4 grams to knees or 2 grams to hands four times daily using enclosed dosing card.    Primary osteoarthritis of both wrists       gabapentin 300 MG capsule    NEURONTIN    540 capsule    1-2 tablets EVERY 8 HOURS    Neuropathic pain       HYDROcodone-acetaminophen 5-325 MG per tablet    NORCO    40 tablet    Take 1 tablet by mouth every 6 hours as needed    Chronic left-sided low back pain, with sciatica presence unspecified       ketoconazole 2 % shampoo    NIZORAL    120 mL    Apply topically daily as needed for itching or irritation    Dermatitis, seborrheic       levothyroxine 112 MCG tablet    SYNTHROID/LEVOTHROID    45 tablet    Take 0.5 tab daily     Hypothyroidism, unspecified type       LORazepam 2 MG tablet    ATIVAN    30 tablet    Take 1 tablet at night for sleep    Sleep disorder       losartan 25 MG tablet    COZAAR    90 tablet    Take 1 tablet (25 mg) by mouth daily    Essential hypertension, benign       metoprolol succinate 25 MG 24 hr tablet    TOPROL-XL    90 tablet    Take 1 tablet (25 mg) by mouth daily    Irregular heart rate       Multi-vitamin Tabs tablet   Generic drug:  multivitamin, therapeutic with minerals      Take 1 tablet by mouth daily.        OMEGA-3 FISH OIL PO      Take 1 capsule by mouth daily        omeprazole 20 MG CR capsule    priLOSEC     Take 20 mg by mouth daily        ondansetron 4 MG ODT tab    ZOFRAN-ODT    90 tablet    1-2 tabs po prn every 6 hours    Nausea       * prednisoLONE acetate 1 % ophthalmic susp    PRED FORTE    1 Bottle    Place 1 drop Into the left eye 3 times daily    Postoperative eye state       * prednisoLONE acetate 1 % ophthalmic susp    PRED FORTE    1 Bottle    Place 1 drop into the right eye 3 times daily    Postoperative eye state       REPHRESH Gel     14 Box    Place 2 g vaginally every 3 days    Urinary tract infection, site not specified       terbinafine 1 % cream    lamISIL AT    12 g    Apply topically 2 times daily For fungal infection not resolved with other antifungals (e.g. Clotrimazole)    Tinea pedis of both feet       triamcinolone 0.1 % ointment    KENALOG    30 g    Apply topically 2 times daily    Eczematous dermatitis       triamterene-hydrochlorothiazide 37.5-25 MG per tablet    MAXZIDE-25    90 tablet    Take 1 tablet by mouth daily    Essential hypertension, benign       * Notice:  This list has 6 medication(s) that are the same as other medications prescribed for you. Read the directions carefully, and ask your doctor or other care provider to review them with you.

## 2018-04-05 ENCOUNTER — TELEPHONE (OUTPATIENT)
Dept: PHARMACY | Facility: CLINIC | Age: 68
End: 2018-04-05

## 2018-04-05 NOTE — TELEPHONE ENCOUNTER
We have been unable to reach this patient for MTM follow-up after several attempts. We will stop reaching out to the patient at this time. Please let us know if we can assist in this patient's care in the future.    Routing to PCP as RICH Heller, Pharm.D., [facility] [place]  Medication Therapy Management Pharmacist  Page/VM:  [phone]

## 2018-04-06 ENCOUNTER — MYC MEDICAL ADVICE (OUTPATIENT)
Dept: AUDIOLOGY | Facility: CLINIC | Age: 68
End: 2018-04-06

## 2018-04-09 NOTE — TELEPHONE ENCOUNTER
"Called patient, she reported she found the device as it was stuck in with her earring. She had questions regarding follow-up appointments.  It was reviewed that she could make an appointment if she would like to review the phone application.  She also reported that things still sounded \"too loud\".  It was discussed that more adjustments regarding volume could be made at a hearing aid follow-up appointment. Patient expressed that she will call the clinic to make a follow-up appointment.    Rik Arauz  Audiologist  [place] License  #[id number]    "

## 2018-04-11 ENCOUNTER — OFFICE VISIT (OUTPATIENT)
Dept: OPHTHALMOLOGY | Facility: CLINIC | Age: 68
End: 2018-04-11
Attending: OPHTHALMOLOGY
Payer: MEDICARE

## 2018-04-11 DIAGNOSIS — Z96.1 PSEUDOPHAKIA OF BOTH EYES: ICD-10-CM

## 2018-04-11 DIAGNOSIS — H18.529 ANTERIOR BASEMENT MEMBRANE DYSTROPHY: Primary | ICD-10-CM

## 2018-04-11 DIAGNOSIS — Z98.890 HISTORY OF RADIAL KERATOTOMY: ICD-10-CM

## 2018-04-11 DIAGNOSIS — H17.9 CORNEAL OPACITY: ICD-10-CM

## 2018-04-11 PROCEDURE — 92025 CPTRIZED CORNEAL TOPOGRAPHY: CPT | Mod: ZF | Performed by: OPHTHALMOLOGY

## 2018-04-11 PROCEDURE — G0463 HOSPITAL OUTPT CLINIC VISIT: HCPCS | Mod: ZF

## 2018-04-11 ASSESSMENT — REFRACTION_WEARINGRX
OS_SPHERE: +6.75
OS_CYLINDER: +2.00
OS_ADD: +2.25
OD_AXIS: 005
SPECS_TYPE: PAL
OD_ADD: +2.25
OS_AXIS: 013
OD_SPHERE: +7.25
OD_CYLINDER: +1.75

## 2018-04-11 ASSESSMENT — VISUAL ACUITY
METHOD: SNELLEN - LINEAR
OD_SC: 20/30
OD_SC+: +2
OD_PH_SC: 20/25
OS_PH_SC: 20/30+1
OS_SC: 20/60
OS_SC+: +2

## 2018-04-11 ASSESSMENT — SLIT LAMP EXAM - LIDS
COMMENTS: NORMAL
COMMENTS: NORMAL

## 2018-04-11 ASSESSMENT — TONOMETRY
OD_IOP_MMHG: 16
IOP_METHOD: ICARE
OS_IOP_MMHG: 16

## 2018-04-11 ASSESSMENT — CONF VISUAL FIELD
OD_NORMAL: 1
METHOD: COUNTING FINGERS
OS_NORMAL: 1

## 2018-04-11 ASSESSMENT — EXTERNAL EXAM - LEFT EYE: OS_EXAM: NORMAL

## 2018-04-11 ASSESSMENT — EXTERNAL EXAM - RIGHT EYE: OD_EXAM: NORMAL

## 2018-04-11 NOTE — PROGRESS NOTES
CC: s/p CE/IOL OU    HPI: 66 yo CF s/p CE/IOL OS. Surgery complicated by opening of RK wounds temporally. Resutured. Doing well, denies any pain, feels like vision is improving. No flashes, diplopia. She has noticed more floaters OD    Interval:  Patient is happy with vision. She reports that vision is stronger in OD than OS. Reports floaters both eyes which are not worsening. Denies new flashes.     POHx:  H/o RK  S/p CE/IOL OS 2/20/18  S/p CE/IOL OD 3/13/18    Gtts:  Finished drops       Assessment / Plan:    1. S/p CE/IOL both eyes    K chato today OD  K suture removal OD  Ofloxacin four times a day  OD    RTC in 1 month with K chato    APOORVA Rodriguez  Cornea fellow    ~~~~~~~~~~~~~~~~~~~~~~~~~~~~~~~~~~~~~~~~~~~~~~~~~~~~~~~~~~~~~~~~    Complete documentation of historical and exam elements from today's encounter can be found in the full encounter summary report (not reduplicated in this progress note). I personally obtained the chief complaint(s) and history of present illness.  I confirmed and edited as necessary the review of systems, past medical/surgical history, family history, social history, and examination findings as documented by others; and I examined the patient myself. I personally reviewed the relevant tests, images, and reports as documented above. I formulated and edited as necessary the assessment and plan and discussed the findings and management plan with the patient and family.    I personally viewed the [imaging] and I agree with the interpretation as documented by the resident/fellow and edited by me as appropriate.    Tyson Ruby MD

## 2018-04-11 NOTE — NURSING NOTE
Chief Complaints and History of Present Illnesses   Patient presents with     Follow Up For     3 week follow up s/p CE/IOL both eyes.     HPI    Affected eye(s):  Both   Symptoms:     No flashes   No redness   No tearing   No Dryness         Do you have eye pain now?:  No      Comments:  Pt states that vision is about the same as last visit. Pt still seeing many floaters in BE. Large string like floater in LE. Pt notes that she still see's clearer with RE than LE. Pt also notes that she has more glare when she is looking at street lights and stop lights.  Pt also notes that she see's a doubled image (up down) when reading the wording on her television.    Hina BELLAMY April 11, 2018 2:07 PM

## 2018-04-11 NOTE — MR AVS SNAPSHOT
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      Your next 10 appointments already scheduled     May 24, 2018  1:00 PM CDT   (Arrive by 12:45 PM)   SHORT with Kisha Heller [facility] Medication Therapy Management ([facility])    [address] [place] [address]  4th Floor  [facility] [address] [phone]            Jun 07, 2018  8:00 AM CDT   US ABDOMEN COMPLETE with UCUS3   [facility] US ([facility])    [address] [place] [address]  1st Floor  [facility] [id number] [phone]           Please bring a list of your medicines (including vitamins, minerals and over-the-counter drugs). Also, tell your doctor about any allergies you may have. Wear comfortable clothes and leave your valuables at home.  Adults: No eating or drinking for 8 hours before the exam. You may take medicine with a small sip of water.  Children: - Children 6+ years: No food or drink for 6 hours before exam. - Children 1-5 years: No food or drink for 4 hours before exam. - Infants, breast-fed: may have breast milk up to 2 hours before exam. - Infants, formula: may have bottle until 4 hours before exam.  Please call the Imaging Department at your exam site with any questions.            Jun 07, 2018  9:30 AM CDT   (Arrive by 9:15 AM)   New Patient Visit with Dhiraj Encarnacion MD   [facility] ([facility])    [address] [place] [address]  Se  4th Floor  [facility] [address]   [phone]            Jun 25, 2018  1:15 PM CDT   RETURN CORNEA with Tyson Ruby MD   Eye Clinic ([facility])    [address]  9[address] Clin [address]a  [facility] [address]   [phone]            Nov 12, 2018  9:30 AM CST   [facility]ic Lab Draw with UC MASONIC LAB DRAW   [facility] Draw ([facility])    [address]  [address]  [facility] [address] [phone]            Nov 12, 2018 10:00 AM CST   (Arrive by 9:45 AM)   Return Visit with Khushbu Louise MD   [facility] ([facility])    [address]  [address]  [facility] [address] [phone]            Nov 12, 2018 10:30 AM CST   Infusion 60 with UC ONCOLOGY INFUSION, UC 31 ATC   [facility] ([facility])    [address]  [address]  [facility] [address] [phone]              Future tests that were ordered for you today     Open Future Orders        Priority Expected Expires Ordered    Corneal Topography OD (right eye) Routine  11/13/2019 5/15/2018            Who to contact     Please call your clinic at [phone] to:    Ask questions about your health    Make or cancel appointments    Discuss your medicines    Learn about your test results    Speak to your doctor            Additional Information About Your Visit        [employer] Information     [employer] gives you secure access to your electronic health record. If you see a primary care provider, you can also send messages to your care team and make appointments. If you have questions, please call your primary care clinic.  If you do not have a primary care provider, please call [phone] and they will assist you.      [employer] is an electronic gateway that provides easy, online access to your medical records. With [employer], you can request a clinic  appointment, read your test results, renew a prescription or communicate with your care team.     To access your existing account, please contact your [facility] or call [phone] for assistance.        Care EveryWhere ID     This is your Care EveryWhere ID. This could be used by other organizations to access your [place] medical records  [id number]         Blood Pressure from Last 3 Encounters:   05/14/18 145/82   05/14/18 145/82   04/13/18 146/84    Weight from Last 3 Encounters:   05/14/18 97.5 kg (215 lb)   05/14/18 97.7 kg (215 lb 4.8 oz)   04/13/18 95.3 kg (210 lb)              We Performed the Following     Corneal Topography OU (both eyes)        Primary Care Provider Office Phone # Fax #    Matilde Quiñonez, APRN [phone]       [address] [facility] [address]  [place] [address]        Equal Access to Services     EDEL LAMA : Hadii aad ku hadasho Soomaali, waaxda luqadaha, qaybta kaalmada adeegyada, waxay idiin haywilfredon seven seals . So [facility] [phone].    ATENCIÓN: Si joannela danie, tiene a davis disposición servicios gratuitos de asistencia lingüística. Llame al [phone].    We comply with applicable federal civil rights laws and [place] laws. We do not discriminate on the basis of race, color, national origin, age, disability, sex, sexual orientation, or gender identity.            Thank you!     Thank you for choosing EYE CLINIC  for your care. Our goal is always to provide you with excellent care. Hearing back from our patients is one way we can continue to improve our services. Please take a few minutes to complete the written survey that you may receive in the mail after your visit with us. Thank you!             Your Updated Medication List - Protect others around you: Learn how to safely use, store and throw away your medicines at www.disposemymeds.org.          This list is accurate as of 4/11/18 11:59 PM.  Always use your most  recent med list.                   Brand Name Dispense Instructions for use Diagnosis    * amLODIPine 5 MG tablet    NORVASC    90 tablet    Take 1 tablet (5 mg) by mouth At Bedtime    Essential hypertension, benign       * amLODIPine 10 MG tablet    NORVASC    90 tablet    Take 1 tablet (10 mg) by mouth At Bedtime AS DIRECTED    Essential hypertension, benign       anastrozole 1 MG tablet    ARIMIDEX    90 tablet    Take 1 tablet (1 mg) by mouth daily    Malignant neoplasm of left breast in female, estrogen receptor positive, unspecified site of breast (H)       atorvastatin 40 MG tablet    LIPITOR    90 tablet    Take 1 tablet (40 mg) by mouth daily Please put on profile- pt just received 90-day supply of cholesterol meds    Pure hypercholesterolemia       * cholecalciferol 1000 UNIT tablet    vitamin D3    100 tablet    Take 1 tablet (1,000 Units) by mouth daily    Osteoporosis       * cholecalciferol 5000 units Caps capsule    vitamin D3    120 capsule    Take 1 capsule (5,000 Units) by mouth daily    Vaginal dryness       CoQ10 200 MG Caps      Take 1 capsule by mouth daily        diclofenac 1 % Gel topical gel    VOLTAREN    100 g    Apply 4 grams to knees or 2 grams to hands four times daily using enclosed dosing card.    Primary osteoarthritis of both wrists       gabapentin 300 MG capsule    NEURONTIN    540 capsule    1-2 tablets EVERY 8 HOURS    Neuropathic pain       HYDROcodone-acetaminophen 5-325 MG per tablet    NORCO    40 tablet    Take 1 tablet by mouth every 6 hours as needed    Chronic left-sided low back pain, with sciatica presence unspecified       ketoconazole 2 % shampoo    NIZORAL    120 mL    Apply topically daily as needed for itching or irritation    Dermatitis, seborrheic       levothyroxine 112 MCG tablet    SYNTHROID/LEVOTHROID    45 tablet    Take 0.5 tab daily    Hypothyroidism, unspecified type       LORazepam 2 MG tablet    ATIVAN    30 tablet    Take 1 tablet at night for sleep     Sleep disorder       losartan 25 MG tablet    COZAAR    90 tablet    Take 1 tablet (25 mg) by mouth daily    Essential hypertension, benign       metoprolol succinate 25 MG 24 hr tablet    TOPROL-XL    90 tablet    Take 1 tablet (25 mg) by mouth daily    Irregular heart rate       Multi-vitamin Tabs tablet   Generic drug:  multivitamin, therapeutic with minerals      Take 1 tablet by mouth daily.        OMEGA-3 FISH OIL PO      Take 1 capsule by mouth daily        omeprazole 20 MG CR capsule    priLOSEC     Take 20 mg by mouth daily        ondansetron 4 MG ODT tab    ZOFRAN-ODT    90 tablet    1-2 tabs po prn every 6 hours    Nausea       * prednisoLONE acetate 1 % ophthalmic susp    PRED FORTE    1 Bottle    Place 1 drop Into the left eye 3 times daily    Postoperative eye state       * prednisoLONE acetate 1 % ophthalmic susp    PRED FORTE    1 Bottle    Place 1 drop into the right eye 3 times daily    Postoperative eye state       REPHRESH Gel     14 Box    Place 2 g vaginally every 3 days    Urinary tract infection, site not specified       terbinafine 1 % cream    lamISIL AT    12 g    Apply topically 2 times daily For fungal infection not resolved with other antifungals (e.g. Clotrimazole)    Tinea pedis of both feet       triamcinolone 0.1 % ointment    KENALOG    30 g    Apply topically 2 times daily    Eczematous dermatitis       triamterene-hydrochlorothiazide 37.5-25 MG per tablet    MAXZIDE-25    90 tablet    Take 1 tablet by mouth daily    Essential hypertension, benign       * Notice:  This list has 6 medication(s) that are the same as other medications prescribed for you. Read the directions carefully, and ask your doctor or other care provider to review them with you.

## 2018-04-13 ENCOUNTER — RADIANT APPOINTMENT (OUTPATIENT)
Dept: GENERAL RADIOLOGY | Facility: CLINIC | Age: 68
End: 2018-04-13
Attending: FAMILY MEDICINE
Payer: COMMERCIAL

## 2018-04-13 ENCOUNTER — OFFICE VISIT (OUTPATIENT)
Dept: FAMILY MEDICINE | Facility: CLINIC | Age: 68
End: 2018-04-13
Payer: COMMERCIAL

## 2018-04-13 VITALS
WEIGHT: 210 LBS | DIASTOLIC BLOOD PRESSURE: 84 MMHG | HEART RATE: 76 BPM | TEMPERATURE: 99.6 F | RESPIRATION RATE: 20 BRPM | OXYGEN SATURATION: 98 % | BODY MASS INDEX: 32.89 KG/M2 | SYSTOLIC BLOOD PRESSURE: 146 MMHG

## 2018-04-13 DIAGNOSIS — M25.561 RIGHT KNEE PAIN, UNSPECIFIED CHRONICITY: ICD-10-CM

## 2018-04-13 DIAGNOSIS — M25.561 RIGHT KNEE PAIN, UNSPECIFIED CHRONICITY: Primary | ICD-10-CM

## 2018-04-13 ASSESSMENT — PAIN SCALES - GENERAL: PAINLEVEL: SEVERE PAIN (7)

## 2018-04-13 NOTE — MR AVS SNAPSHOT
After Visit Summary   4/13/2018    Nadira Perez    MRN: [id number]           Patient Information     Date Of Birth          1950        Visit Information        Provider Department      4/13/2018 12:05 PM Live Todd MD [facility]        Today's Diagnoses     Right knee pain, unspecified chronicity    -  1      Care Instructions    Primary Care Center: [phone]     Primary Care Center Medication Refill Request Information:  * Please contact your pharmacy regarding ANY request for medication refills.  ** [facility] Prescription Fax = [phone]  * Please allow 3 business days for routine medication refills.  * Please allow 5 business days for controlled substance medication refills.     Primary Care Center Test Result notification information:  *You will be notified with in 7-10 days of your appointment day regarding the results of your test.  If you are on MyChart you will be notified as soon as the provider has reviewed the results and signed off on them.          Follow-ups after your visit        Your next 10 appointments already scheduled     May 14, 2018  9:30 AM CDT   [facility] with  [facility]   [facility]    [address] [place] [address]  [address]  [facility] [address] [phone]            May 14, 2018 10:00 AM CDT   (Arrive by 9:45 AM)   Return Visit with Khushbu Louise MD   [facility] ([facility])    [address]  [address]  [facility] [id number] [phone]            May 14, 2018 10:30 AM CDT   Infusion 60 with UC ONCOLOGY INFUSION, UC 31 ATC   [facility] ([facility])    [address] [place] [address]  [address]  [facility] [address]   [phone]            May 16, 2018  1:15 PM CDT   RETURN CORNEA with Tyson Ruby MD   [facility] Clinic ([facility])    [facility]z Herbert  [address]  9[address] Clin [address]  [facility] [address]   [phone]              Future tests that were ordered for you today     Open Future Orders        Priority Expected Expires Ordered    XR Knee Right 3 Views Routine 4/13/2018 4/13/2019 4/13/2018            Who to contact     Please call your clinic at [phone] to:    Ask questions about your health    Make or cancel appointments    Discuss your medicines    Learn about your test results    Speak to your doctor            Additional Information About Your Visit        [employer] Information     [employer] [place] [employer] gives you secure access to your electronic health record. If you see a primary care provider, you can also send messages to your care team and make appointments. If you have questions, please call your primary care clinic.  If you do not have a primary care provider, please call [phone] and they will assist you.      [employer] [place] [employer] is an electronic gateway that provides easy, online access to your medical records. With [employer] [place] [employer], you can request a clinic appointment, read your test results, renew a prescription or communicate with your care team.     To access your existing account, please contact your [facility] or call [phone] for assistance.        Care EveryWhere ID     This is your Care EveryWhere ID. This could be used by other organizations to access your [place] medical records  [id number]        Your Vitals Were     Pulse Temperature Respirations Pulse Oximetry BMI (Body Mass Index)       76 99.6  F (37.6  C) 20 98% 32.89 kg/m2        Blood Pressure from Last 3 Encounters:   04/13/18 146/84   02/15/18 134/85   11/29/17 134/75    Weight from Last 3 Encounters:   04/13/18 95.3 kg (210 lb)   02/15/18 95.8 kg (211 lb 1.6 oz)   12/21/17 95.3 kg (210 lb)                 Today's Medication Changes          These changes are accurate as of 4/13/18  1:29 PM.  If you have any questions, ask your nurse or  doctor.               These medicines have changed or have updated prescriptions.        Dose/Directions    * diclofenac 1 % Gel topical gel   Commonly known as:  VOLTAREN   This may have changed:  Another medication with the same name was added. Make sure you understand how and when to take each.   Used for:  Primary osteoarthritis of both wrists   Changed by:  Live Todd MD        Apply 4 grams to knees or 2 grams to hands four times daily using enclosed dosing card.   Quantity:  100 g   Refills:  1       * diclofenac 1 % Gel topical gel   Commonly known as:  VOLTAREN   This may have changed:  You were already taking a medication with the same name, and this prescription was added. Make sure you understand how and when to take each.   Used for:  Right knee pain, unspecified chronicity   Changed by:  Live Todd MD        Apply 4 grams to knees or 2 grams to hands four times daily using enclosed dosing card.   Quantity:  100 g   Refills:  1       * Notice:  This list has 2 medication(s) that are the same as other medications prescribed for you. Read the directions carefully, and ask your doctor or other care provider to review them with you.         Where to get your medicines      These medications were sent to [facility] [address] [address] [facility] - [place], [place] - [address]  [facility]  [address]  [address] , [facility] [address]     Phone:  [phone]     diclofenac 1 % Gel topical gel                Primary Care Provider Office Phone # Fax #    Matilde Zamorabigg, MERCEDES -[phone]       [address] [place] [address]  [facility] [address]        Equal Access to Services     DANK [facility] AH: Sonali Russo, waaxda luelizabeth, qaybta kaalmada pb, cierra daugherty. So [facility] [phone].    ATENCIÓN: Si habla español, tiene a davis disposición servicios gratuitos de asistencia lingüística. Llame al [phone].    We comply with applicable federal civil rights  laws and [place] laws. We do not discriminate on the basis of race, color, national origin, age, disability, sex, sexual orientation, or gender identity.            Thank you!     Thank you for choosing [facility]  for your care. Our goal is always to provide you with excellent care. Hearing back from our patients is one way we can continue to improve our services. Please take a few minutes to complete the written survey that you may receive in the mail after your visit with us. Thank you!             Your Updated Medication List - Protect others around you: Learn how to safely use, store and throw away your medicines at www.disposemymeds.org.          This list is accurate as of 4/13/18  1:29 PM.  Always use your most recent med list.                   Brand Name Dispense Instructions for use Diagnosis    * amLODIPine 5 MG tablet    NORVASC    90 tablet    Take 1 tablet (5 mg) by mouth At Bedtime    Essential hypertension, benign       * amLODIPine 10 MG tablet    NORVASC    90 tablet    Take 1 tablet (10 mg) by mouth At Bedtime AS DIRECTED    Essential hypertension, benign       anastrozole 1 MG tablet    ARIMIDEX    90 tablet    Take 1 tablet (1 mg) by mouth daily    Malignant neoplasm of left breast in female, estrogen receptor positive, unspecified site of breast (H)       atorvastatin 40 MG tablet    LIPITOR    90 tablet    Take 1 tablet (40 mg) by mouth daily Please put on profile- pt just received 90-day supply of cholesterol meds    Pure hypercholesterolemia       * cholecalciferol 1000 UNIT tablet    vitamin D3    100 tablet    Take 1 tablet (1,000 Units) by mouth daily    Osteoporosis       * cholecalciferol 5000 units Caps capsule    vitamin D3    120 capsule    Take 1 capsule (5,000 Units) by mouth daily    Vaginal dryness       CoQ10 200 MG Caps      Take 1 capsule by mouth daily        * diclofenac 1 % Gel topical gel    VOLTAREN    100 g    Apply 4 grams to knees or 2 grams to  hands four times daily using enclosed dosing card.    Primary osteoarthritis of both wrists       * diclofenac 1 % Gel topical gel    VOLTAREN    100 g    Apply 4 grams to knees or 2 grams to hands four times daily using enclosed dosing card.    Right knee pain, unspecified chronicity       gabapentin 300 MG capsule    NEURONTIN    540 capsule    1-2 tablets EVERY 8 HOURS    Neuropathic pain       HYDROcodone-acetaminophen 5-325 MG per tablet    NORCO    40 tablet    Take 1 tablet by mouth every 6 hours as needed    Chronic left-sided low back pain, with sciatica presence unspecified       ketoconazole 2 % shampoo    NIZORAL    120 mL    Apply topically daily as needed for itching or irritation    Dermatitis, seborrheic       levothyroxine 112 MCG tablet    SYNTHROID/LEVOTHROID    45 tablet    Take 0.5 tab daily    Hypothyroidism, unspecified type       LORazepam 2 MG tablet    ATIVAN    30 tablet    Take 1 tablet at night for sleep    Sleep disorder       losartan 25 MG tablet    COZAAR    90 tablet    Take 1 tablet (25 mg) by mouth daily    Essential hypertension, benign       metoprolol succinate 25 MG 24 hr tablet    TOPROL-XL    90 tablet    Take 1 tablet (25 mg) by mouth daily    Irregular heart rate       Multi-vitamin Tabs tablet   Generic drug:  multivitamin, therapeutic with minerals      Take 1 tablet by mouth daily.        OMEGA-3 FISH OIL PO      Take 1 capsule by mouth daily        omeprazole 20 MG CR capsule    priLOSEC     Take 20 mg by mouth daily        ondansetron 4 MG ODT tab    ZOFRAN-ODT    90 tablet    1-2 tabs po prn every 6 hours    Nausea       * prednisoLONE acetate 1 % ophthalmic susp    PRED FORTE    1 Bottle    Place 1 drop Into the left eye 3 times daily    Postoperative eye state       * prednisoLONE acetate 1 % ophthalmic susp    PRED FORTE    1 Bottle    Place 1 drop into the right eye 3 times daily    Postoperative eye state       REPHRESH Gel     14 Box    Place 2 g vaginally  every 3 days    Urinary tract infection, site not specified       terbinafine 1 % cream    lamISIL AT    12 g    Apply topically 2 times daily For fungal infection not resolved with other antifungals (e.g. Clotrimazole)    Tinea pedis of both feet       triamcinolone 0.1 % ointment    KENALOG    30 g    Apply topically 2 times daily    Eczematous dermatitis       triamterene-hydrochlorothiazide 37.5-25 MG per tablet    MAXZIDE-25    90 tablet    Take 1 tablet by mouth daily    Essential hypertension, benign       * Notice:  This list has 8 medication(s) that are the same as other medications prescribed for you. Read the directions carefully, and ask your doctor or other care provider to review them with you.

## 2018-04-13 NOTE — NURSING NOTE
Chief Complaint   Patient presents with     Fall     fell at work -4/12/18,, fell on left side and back, twisted right knee   Patricia Trejo LPN 12:55 PM on 4/13/2018    Rooming Note  Health Maintenance   Health Maintenance Due   Topic Date Due     ADVANCE DIRECTIVE PLANNING Q5 YRS  06/28/2007    All health maintenance items discussed and pended.

## 2018-04-13 NOTE — TELEPHONE ENCOUNTER
"ANDRE rx needed per insurance for rx sent earlier today   Voltaren 1% Gel:   Fax received from [employer] that reads, \"Alternative requested; brand name is required by insurance. Please send a new Rx with ANDRE\".   "

## 2018-04-13 NOTE — PROGRESS NOTES
"[facility]  Primary Care Center   Live Todd MD  04/13/2018      Chief Complaint:   Fall    History of Present Illness:   Nadira Perez is a 67 year old female with a history of breast cancer s/p radiation therapy, hypertension, and hypothyroidism who presents for evaluation of a fall.     She had a mechanical fall yesterday onto her left side when her foot got caught on a step. Her left shoulder, elbow, hip, knee, and back are sore today, but she is most concerned for her right knee which may have twisted as she fell. Her right back is also in pain but this is not a new issue for her. Her knee is swollen and \"snaps\" with movement and causes her to limp slightly. She denies bleeding. Prior to the fall, her knee had not been an issue. She denies loss of consciousness before the fall or upon fall. She has been taking ibuprofen with caution due to history of GI problems.       Immunization History   Administered Date(s) Administered     HEPA 04/26/2005, 10/26/2005     HepB 04/26/2005, 06/02/2005, 10/26/2005     Influenza (High Dose) 3 valent vaccine 10/06/2017     Influenza (IIV3) PF 10/01/2008, 09/29/2009, 10/01/2011, 10/02/2012, 10/09/2013, 10/01/2014     Influenza Intranasal Vaccine 4 valent 10/02/2015     Influenza Vaccine IM 3yrs+ 4 Valent IIV4 10/13/2016     Pneumo Conj 13-V (2010&after) 08/01/2017     Pneumococcal 23 valent 10/01/2008     TD (ADULT, 7+) 01/10/2000, 04/26/2005     TDAP Vaccine (Boostrix) 11/06/2012     Typhoid IM 04/26/2005     Zoster vaccine recombinant adjuvanted (SHINGRIX) 02/15/2018     Zoster vaccine, live 11/14/2012             Review of Systems:   Pertinent items are noted in HPI, remainder of complete ROS is negative.      Active Medications:      prednisoLONE acetate (PRED FORTE) 1 % ophthalmic susp, Place 1 drop into the right eye 3 times daily, Disp: 1 Bottle, Rfl: 0     prednisoLONE acetate (PRED FORTE) 1 % ophthalmic susp, Place 1 drop Into the left eye 3 times daily, " Disp: 1 Bottle, Rfl: 0     amLODIPine (NORVASC) 10 MG tablet, Take 1 tablet (10 mg) by mouth At Bedtime AS DIRECTED, Disp: 90 tablet, Rfl: 3     levothyroxine (SYNTHROID/LEVOTHROID) 112 MCG tablet, Take 0.5 tab daily, Disp: 45 tablet, Rfl: 3     HYDROcodone-acetaminophen (NORCO) 5-325 MG per tablet, Take 1 tablet by mouth every 6 hours as needed, Disp: 40 tablet, Rfl: 0     anastrozole (ARIMIDEX) 1 MG tablet, Take 1 tablet (1 mg) by mouth daily, Disp: 90 tablet, Rfl: 3     triamterene-hydrochlorothiazide (MAXZIDE-25) 37.5-25 MG per tablet, Take 1 tablet by mouth daily, Disp: 90 tablet, Rfl: 3     atorvastatin (LIPITOR) 40 MG tablet, Take 1 tablet (40 mg) by mouth daily Please put on profile- pt just received 90-day supply of cholesterol meds, Disp: 90 tablet, Rfl: 3     gabapentin (NEURONTIN) 300 MG capsule, 1-2 tablets EVERY 8 HOURS, Disp: 540 capsule, Rfl: 3     metoprolol (TOPROL-XL) 25 MG 24 hr tablet, Take 1 tablet (25 mg) by mouth daily, Disp: 90 tablet, Rfl: 3     Coenzyme Q10 (COQ10) 200 MG CAPS, Take 1 capsule by mouth daily, Disp: , Rfl:      omeprazole (PRILOSEC) 20 MG CR capsule, Take 20 mg by mouth daily, Disp: , Rfl:      ketoconazole (NIZORAL) 2 % shampoo, Apply topically daily as needed for itching or irritation, Disp: 120 mL, Rfl: 11     terbinafine (LAMISIL AT) 1 % cream, Apply topically 2 times daily For fungal infection not resolved with other antifungals (e.g. Clotrimazole), Disp: 12 g, Rfl: 1     ondansetron (ZOFRAN-ODT) 4 MG ODT tab, 1-2 tabs po prn every 6 hours, Disp: 90 tablet, Rfl: 2     diclofenac (VOLTAREN) 1 % GEL topical gel, Apply 4 grams to knees or 2 grams to hands four times daily using enclosed dosing card., Disp: 100 g, Rfl: 1     Omega-3 Fatty Acids (OMEGA-3 FISH OIL PO), Take 1 capsule by mouth daily , Disp: , Rfl:      LORazepam (ATIVAN) 2 MG tablet, Take 1 tablet at night for sleep, Disp: 30 tablet, Rfl: 1     cholecalciferol (VITAMIN D) 1000 UNIT tablet, Take 1 tablet (1,000  Units) by mouth daily, Disp: 100 tablet, Rfl: 3     cholecalciferol (VITAMIN D3) 5000 UNITS CAPS capsule, Take 1 capsule (5,000 Units) by mouth daily, Disp: 120 capsule, Rfl: 3     triamcinolone (KENALOG) 0.1 % ointment, Apply topically 2 times daily, Disp: 30 g, Rfl: 1     Vaginal Lubricant (REPHRESH) GEL, Place 2 g vaginally every 3 days, Disp: 14 Box, Rfl: 3     Multiple Vitamin (MULTI-VITAMIN) per tablet, Take 1 tablet by mouth daily., Disp: , Rfl:      losartan (COZAAR) 25 MG tablet, Take 1 tablet (25 mg) by mouth daily (Patient not taking: Reported on 4/13/2018), Disp: 90 tablet, Rfl: 3     amLODIPine (NORVASC) 5 MG tablet, Take 1 tablet (5 mg) by mouth At Bedtime (Patient not taking: Reported on 4/13/2018), Disp: 90 tablet, Rfl: 3      Allergies:   Penicillins      Past Medical History:  Arthritis  Bone disease  History of breast cancer  History of kyphoplasty  Hearing problem  History of kidney stones  History of radiation therapy  Hyperlipidemia  Hypertension  Hypopotassemia  Kidney problem  Lymph edema  Medullary sponge kidney  Osteopenia  Postoperative nausea and vomiting  Reduced vision  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  Hypothyroidism   Nephrolithiasis  Inflamed seborrheic keratosis  Vitamin D deficiency  Dermatitis  Anterior basement membrane dystrophy, bilateral eyes  Corneal opacity  Ostopenia, unspecified location  Aromatase inhibitor use     Past Surgical History:  Ovarian cyst, mesh  Wrist arthrodesis   Vaginal skin biopsy  Cataract IOL, right  External ear surgery, right  Cosmetic surgery  Radial keratomy  Bone graft from iliac crest  Umbilical hernia repair, multiple  Hysterectomy, total  Mastectomy, bilateral  Parathyroidectomy  Rhinoplasty  Tonsillectomy     Family History:   Father - AAA, hypertension  Mother - cerebral aneurysm, dementia, diabetes, thyroid disease, osteoporosis  Maternal grandmother - diabetes, asthma  Maternal grandfather - COPD, asthma  Brother -  peripheral neuropathy  Other - diabetes, melanoma, dementia, malignant melanoma, hypertension, cerebrovascular disease, obesity, respiratory      Social History:   Patient reports no history of tobacco use. She occasionally consumes alcohol.      Physical Exam:   /84 (BP Location: Right arm, Patient Position: Sitting, Cuff Size: Adult Large)  Pulse 76  Temp 99.6  F (37.6  C)  Resp 20  Wt 95.3 kg (210 lb)  SpO2 98%  BMI 32.89 kg/m2     Constitutional: Alert. In no distress.  Head: Normocephalic. No masses, lesions, tenderness or abnormalities.  Musculoskeletal: right knee mild effusion and mild diffuse tenderness. walking with slight limp to compensate. Mild tenderness over entire left side including extremities, hip, knee, and SI joints bilaterally. However, her spine is non-tender and she has no swelling or bruising elsewhere in her body. She can move these joints easily and fully. Extremities normal. No gross deformities noted. Normal muscle tone.  Neurologic: Gait normal. Reflexes normal and symmetric. Sensation grossly WNL.  Psychiatric: Mentation appears normal. Normal affect.     Assessment and Plan:  1. Right knee pain, unspecified chronicity  Right knee swollen and tender on examination. Ice and take tylenol as needed for the next few days. If no improvement, I did inform her of walk in sports medicine.   - XR Knee Right 3 Views  - diclofenac (VOLTAREN) 1 % GEL topical gel  Dispense: 100 g; Refill: 1        Follow-up: As needed.          Scribe Disclosure:   I, Ruma Tompkins, am serving as a scribe to document services personally performed by Live Todd MD at this visit, based upon the provider's statements to me. All documentation has been reviewed by the aforementioned provider prior to being entered into the official medical record.     Portions of this medical record were completed by a scribe. UPON MY REVIEW AND AUTHENTICATION BY ELECTRONIC SIGNATURE, this confirms (a) I performed  the applicable clinical services, and (b) the record is accurate.   Live Todd MD

## 2018-05-11 ENCOUNTER — MYC MEDICAL ADVICE (OUTPATIENT)
Dept: INTERNAL MEDICINE | Facility: CLINIC | Age: 68
End: 2018-05-11

## 2018-05-11 DIAGNOSIS — E03.9 HYPOTHYROIDISM: Primary | ICD-10-CM

## 2018-05-14 ENCOUNTER — INFUSION THERAPY VISIT (OUTPATIENT)
Dept: ONCOLOGY | Facility: CLINIC | Age: 68
End: 2018-05-14
Attending: INTERNAL MEDICINE
Payer: MEDICARE

## 2018-05-14 ENCOUNTER — APPOINTMENT (OUTPATIENT)
Dept: LAB | Facility: CLINIC | Age: 68
End: 2018-05-14
Attending: INTERNAL MEDICINE
Payer: MEDICARE

## 2018-05-14 ENCOUNTER — OFFICE VISIT (OUTPATIENT)
Dept: ORTHOPEDICS | Facility: CLINIC | Age: 68
End: 2018-05-14
Payer: MEDICARE

## 2018-05-14 VITALS
HEIGHT: 67 IN | BODY MASS INDEX: 33.79 KG/M2 | WEIGHT: 215.3 LBS | RESPIRATION RATE: 18 BRPM | SYSTOLIC BLOOD PRESSURE: 145 MMHG | TEMPERATURE: 99.9 F | OXYGEN SATURATION: 96 % | DIASTOLIC BLOOD PRESSURE: 82 MMHG | HEART RATE: 86 BPM

## 2018-05-14 VITALS
DIASTOLIC BLOOD PRESSURE: 82 MMHG | HEIGHT: 67 IN | WEIGHT: 215 LBS | BODY MASS INDEX: 33.74 KG/M2 | HEART RATE: 86 BPM | SYSTOLIC BLOOD PRESSURE: 145 MMHG

## 2018-05-14 DIAGNOSIS — E03.9 HYPOTHYROIDISM: ICD-10-CM

## 2018-05-14 DIAGNOSIS — C50.919 MALIGNANT NEOPLASM OF BREAST IN FEMALE, ESTROGEN RECEPTOR POSITIVE, UNSPECIFIED LATERALITY, UNSPECIFIED SITE OF BREAST (H): ICD-10-CM

## 2018-05-14 DIAGNOSIS — Z17.0 MALIGNANT NEOPLASM OF BREAST IN FEMALE, ESTROGEN RECEPTOR POSITIVE, UNSPECIFIED LATERALITY, UNSPECIFIED SITE OF BREAST (H): ICD-10-CM

## 2018-05-14 DIAGNOSIS — M25.561 ACUTE PAIN OF RIGHT KNEE: Primary | ICD-10-CM

## 2018-05-14 DIAGNOSIS — C50.912 MALIGNANT NEOPLASM OF LEFT BREAST IN FEMALE, ESTROGEN RECEPTOR POSITIVE, UNSPECIFIED SITE OF BREAST (H): ICD-10-CM

## 2018-05-14 DIAGNOSIS — M85.80 OSTEOPENIA, UNSPECIFIED LOCATION: Primary | ICD-10-CM

## 2018-05-14 DIAGNOSIS — Z79.811 AROMATASE INHIBITOR USE: ICD-10-CM

## 2018-05-14 DIAGNOSIS — D17.1 LIPOMA OF TORSO: Primary | ICD-10-CM

## 2018-05-14 DIAGNOSIS — Z17.0 MALIGNANT NEOPLASM OF LEFT BREAST IN FEMALE, ESTROGEN RECEPTOR POSITIVE, UNSPECIFIED SITE OF BREAST (H): ICD-10-CM

## 2018-05-14 LAB
ALBUMIN SERPL-MCNC: 4 G/DL (ref 3.4–5)
CALCIUM SERPL-MCNC: 9.3 MG/DL (ref 8.5–10.1)
CREAT SERPL-MCNC: 0.52 MG/DL (ref 0.52–1.04)
GFR SERPL CREATININE-BSD FRML MDRD: >90 ML/MIN/1.7M2
TSH SERPL DL<=0.005 MIU/L-ACNC: 3.06 MU/L (ref 0.4–4)

## 2018-05-14 PROCEDURE — G0463 HOSPITAL OUTPT CLINIC VISIT: HCPCS | Mod: ZF

## 2018-05-14 PROCEDURE — 82565 ASSAY OF CREATININE: CPT | Performed by: INTERNAL MEDICINE

## 2018-05-14 PROCEDURE — 25000128 H RX IP 250 OP 636: Mod: ZF | Performed by: INTERNAL MEDICINE

## 2018-05-14 PROCEDURE — 84443 ASSAY THYROID STIM HORMONE: CPT | Performed by: NURSE PRACTITIONER

## 2018-05-14 PROCEDURE — 99214 OFFICE O/P EST MOD 30 MIN: CPT | Mod: ZP | Performed by: INTERNAL MEDICINE

## 2018-05-14 PROCEDURE — 82040 ASSAY OF SERUM ALBUMIN: CPT | Performed by: INTERNAL MEDICINE

## 2018-05-14 PROCEDURE — 96365 THER/PROPH/DIAG IV INF INIT: CPT

## 2018-05-14 PROCEDURE — 82310 ASSAY OF CALCIUM: CPT | Performed by: INTERNAL MEDICINE

## 2018-05-14 RX ORDER — KETOROLAC TROMETHAMINE 5 MG/ML
1 SOLUTION OPHTHALMIC
COMMUNITY
Start: 2018-03-13 | End: 2018-07-24

## 2018-05-14 RX ORDER — OFLOXACIN 3 MG/ML
1 SOLUTION/ DROPS OPHTHALMIC
COMMUNITY
Start: 2018-03-13 | End: 2018-07-24

## 2018-05-14 RX ORDER — ZOLEDRONIC ACID 0.04 MG/ML
4 INJECTION, SOLUTION INTRAVENOUS ONCE
Status: COMPLETED | OUTPATIENT
Start: 2018-05-14 | End: 2018-05-14

## 2018-05-14 RX ORDER — ZOLEDRONIC ACID 0.04 MG/ML
4 INJECTION, SOLUTION INTRAVENOUS ONCE
Status: CANCELLED | OUTPATIENT
Start: 2018-05-14 | End: 2018-05-14

## 2018-05-14 RX ADMIN — ZOLEDRONIC ACID 4 MG: 0.04 INJECTION, SOLUTION INTRAVENOUS at 11:35

## 2018-05-14 RX ADMIN — SODIUM CHLORIDE 250 ML: 9 INJECTION, SOLUTION INTRAVENOUS at 11:35

## 2018-05-14 ASSESSMENT — PAIN SCALES - GENERAL: PAINLEVEL: EXTREME PAIN (8)

## 2018-05-14 NOTE — LETTER
5/14/2018       RE: Nadira Perez  [address]  [facility] [address]     Dear Colleague,    Thank you for referring your patient, Nadira Perez, to the [facility]. Please see a copy of my visit note below.    [facility]  ONCOLOGY FOLLOW-UP VISIT NOTE    PATIENT NAME: Nadira Perez    YOB: 1950  MRN :[id number]  DATE OF VISIT: May 14, 2018     REASON FOR VISIT : breast cancer follow up.     HISTORY OF PRESENT ILLNESS : The patient is a 66-year-old woman who in 06/2007 had the onset of left-sided breast discomfort that extended into the axilla. She ultimately did undergo mammogram on 06/13/2007, which identified a mass at the 2:30 position in the left breast. Ultrasound also was notable for some skin and areolar complex thickening. She did undergo biopsy of the mass, and this was consistent with an infiltrating ductal carcinoma that was grade 2. The tumor was ER positive, DE positive, and HER2 negative by FISH. She also had a biopsy of left axillary lymph node versus the tail of the axilla, and this was consistent with metastatic carcinoma. Following the diagnosis the patient did have metastatic staging to include a PET CT scan. This demonstrated increased activity in the left breast as well as the lymph nodes, but was otherwise negative. There was also an MRI of the breast performed, and this demonstrated other lesions of the left breast, but no abnormalities on the right.     She initiated neoadjuvant chemotherapy for her inflammatory breast cancer on 07/13/2007. She received  for 3 cycles through 08/24/2007. This was followed by Taxotere for 3 cycles, which was administered 09/14/2007 through 10/26/2007. Following her chemotherapy she did undergo a left-sided mastectomy with axillary lymph node dissection on 11/20/2007 by Dr. Mauro Mei. The patient did have residual carcinoma with a 1.0 cm tumor as well as  "associated DCIS. Thirteen of 33 lymph nodes were positive, the largest of which was 0.6 cm of tumor infiltration and included extracapsular extension. The patient also had a right-sided prophylactic mastectomy, which did not demonstrate any noninvasive or invasive carcinoma. Just prior to her surgery sImael was placed on Arimidex (start date 11/15/2007). Following her surgery she did have some issues with left chest wall cellulitis as well as some lymphedema of the left upper extremity. She ultimately did go on to receive radiation therapy under the direction of Dr. Nghia Burch. She received radiation to the left chest wall at a dose of 6440 cGy, to the left supraclavicular fossa at a dose of 5040 cGy, and to the left internal mammary chain at a dose of 5080 cGy. Last dose of radiation was administered on 03/07/2008.     INTERVAL HISTORY:  Nadira is here for routine visit.   Ms. Perez completed 10 years of endocrine therapy with Arimidex and then tamoxifen.  In 11/2017, she then went back on Arimidex for a prolonged extended therapy as per the MA17 trial.      She says she is overall tolerating it well.  No issues with hot flashes.  She continues to have intermittent arthralgias, worse in the right knee than the left knee.  No other joint pains.  She also had a recent fall on her right knee.  This was work related and she has been doing therapy for that.  She is scheduled to see Orthopedics later today.      No fevers or chills, no chest pain or shortness of breath, no nausea.  Occasionally, she will describe as she swallows pills incorrectly will notice 1 or 2 episodes of vomiting.  No new medical problems.  She continues to have intermittent neuropathy.      REVIEW OF SYSTEMS:  Her 10-point review of systems is otherwise negative.       PHYSICAL EXAMINATION:   /82  Pulse 86  Temp 99.9  F (37.7  C)  Resp 18  Ht 1.702 m (5' 7.01\")  Wt 97.7 kg (215 lb 4.8 oz)  SpO2 96%  BMI 33.71 kg/m2  Wt Readings from " Last 5 Encounters:   05/14/18 97.7 kg (215 lb 4.8 oz)   04/13/18 95.3 kg (210 lb)   02/15/18 95.8 kg (211 lb 1.6 oz)   12/21/17 95.3 kg (210 lb)   11/29/17 95.3 kg (210 lb)     GENERAL : Alert and oriented , not in distress .   HEENT: Normocephalic head.  Anicteric sclerae.No oral ulceration.   NECK: Supple, no thyromegaly.   LYMPH:  No cervical, supraclavicular, axillary, epitrochlear, or inguinal lymphadenopathy.  BREAST: She is status post bilateral mastectomies without reconstruction. No chest wall mass or concerning lesions identified. Multiple chest telangectasias in the left side.  There are no dominant masses on palpation of the chest wall, along the mastectomy scars or into the axilla bilaterally.   LUNGS: Clear breath sounds bilaterally, no wheezing, no crackles.    CARDIOVASCULAR: S1 and S2 well heard, regular rate and rhythm, no murmur or gallop. JVP absent.    ABDOMEN: Soft, nontender, positive bowel sounds. No organomegaly was appreciated.  EXTREMITIES: No cyanosis, no clubbing, no edema.    SKIN: No skin rash, no purpura or petechiae.  palpable 2 cm round movable lipoma posterior right torso  NEUROLOGIC: Normal mental status. Alert and oriented .Cranial nerves II through XII grossly intact.  No focal weakness. Sensory examination grossly intact.  Normal coordination( normal finger-to-nose touching) .  Romberg`s sign is negative. She was observed while she was walking and no significant abnormalities noted.     Lab Results   Component Value Date    WBC 8.1 02/15/2018     Lab Results   Component Value Date    RBC 4.38 02/15/2018     Lab Results   Component Value Date    HGB 12.1 02/15/2018     Lab Results   Component Value Date    HCT 38.0 02/15/2018     No components found for: MCT  Lab Results   Component Value Date    MCV 87 02/15/2018     Lab Results   Component Value Date    MCH 27.6 02/15/2018     Lab Results   Component Value Date    MCHC 31.8 02/15/2018     Lab Results   Component Value Date     RDW 15.6 02/15/2018     Lab Results   Component Value Date     02/15/2018       ASSESSMENT AND PLAN:  1. Inflammatory breast cancer, stage IIIC, the left breast, ER positive, HER2 negative, status post neoadjuvant chemotherapy followed by bilateral mastectomies. Chest wall radiotherapy completed in 03/2008. She was on adjuvant Arimidex from November 2007 until November 2012, and then was switched to tamoxifen. She'll complete 10 years of adjuvant hormonal therapy in November 2017.  At that time, she was switched back to arimidex per MA 17 trial.  She is tolerating this well.    2. Gait imbalance : Unchanged.  Normal brain MRI in November.  Continue pilates and PT.    3. Osteoporosis. Adjuvant zometa today.    4. Vaginal dryness - on replens.       5. Insomnia.   Discussed good sleep hygiene.     6. Lipoma - growing on posterior torse.  Will arrange for us and referral to general surgery.    Khushbu Louise MD    Again, thank you for allowing me to participate in the care of your patient.      Sincerely,    Khushbu Louise MD

## 2018-05-14 NOTE — MR AVS SNAPSHOT
After Visit Summary   5/14/2018    Nadira Perez    MRN: [id number]           Patient Information     Date Of Birth          1950        Visit Information        Provider Department      5/14/2018 10:30 AM  31 ATC;  ONCOLOGY INFUSION [facility]        Today's Diagnoses     Osteopenia, unspecified location    -  1    Aromatase inhibitor use        Malignant neoplasm of left breast in female, estrogen receptor positive, unspecified site of breast (H)          Care Instructions    Contact Numbers    [facility] Main Line: [phone]  [facility] Triage:  [phone]    Call triage with chills and/or temperature greater than or equal to 100.5, uncontrolled nausea/vomiting, diarrhea, constipation, dizziness, shortness of breath, chest pain, bleeding, unexplained bruising, or any new/concerning symptoms, questions/concerns.     If you are having any concerning symptoms or wish to speak to a provider before your next infusion visit, please call your care coordinator or triage to notify them so we can adequately serve you.       After Hours: [phone]    If after hours, weekends, or holidays, call main hospital  and ask for Oncology doctor on call.           May 2018   Krish Monday Tuesday Wednesday Thursday Friday Saturday             1     2     3     4     5       6     7     8     9     10     11     12       13     14     [facility] LAB DRAW    9:30 AM   (15 min.)    [facility] LAB DRAW   [facility] Lab Draw     [facility] RETURN    9:45 AM   (30 min.)   Khushbu Louise MD   [facility]     [facility] ONC INFUSION 60   10:30 AM   (60 min.)    ONCOLOGY INFUSION   [facility] 15     16     [facility] RETURN CORNEA    1:15 PM   (15 min.)   Tyson Ruby MD   [facility] 17     18     19       20     21     22     23     24     25     26       27     28     29     30     31 June 2018 Sunday Monday Tuesday Wednesday  Thursday Friday Saturday                            1     2       3     4     5     6     7     8     9       10     11     12     13     14     15     16       17     18     19     20     21     22     23       24     25     26     27     28  Happy Birthday!     29     30                 Recent Results (from the past 24 hour(s))   Creatinine    Collection Time: 05/14/18 10:28 AM   Result Value Ref Range    Creatinine 0.52 0.52 - 1.04 mg/dL    GFR Estimate >90 >60 mL/min/1.7m2    GFR Estimate If Black >90 >60 mL/min/1.7m2   Calcium    Collection Time: 05/14/18 10:28 AM   Result Value Ref Range    Calcium 9.3 8.5 - 10.1 mg/dL   Albumin level    Collection Time: 05/14/18 10:28 AM   Result Value Ref Range    Albumin 4.0 3.4 - 5.0 g/dL   TSH with free T4 reflex    Collection Time: 05/14/18 10:29 AM   Result Value Ref Range    TSH 3.06 0.40 - 4.00 mU/L                 Follow-ups after your visit        Your next 10 appointments already scheduled     May 16, 2018  1:15 PM CDT   RETURN CORNEA with Tyson Ruby MD   Eye Clinic ([facility])    [address]  [facility] [address] [phone]              Future tests that were ordered for you today     Open Future Orders        Priority Expected Expires Ordered    US Abdomen Complete Routine  5/14/2019 5/14/2018            Who to contact     If you have questions or need follow up information about today's clinic visit or your schedule please contact [facility] directly at [phone].  Normal or non-critical lab and imaging results will be communicated to you by MyChart, letter or phone within 4 business days after the clinic has received the results. If you do not hear from us within 7 days, please contact the clinic through MyChart or phone. If you have a critical or abnormal lab result, we will notify you by phone as soon as possible.  Submit refill requests through Cell>Pointhart or  call your pharmacy and they will forward the refill request to us. Please allow 3 business days for your refill to be completed.          Additional Information About Your Visit        Max-V[employer] Information     P[employer]ert gives you secure access to your electronic health record. If you see a primary care provider, you can also send messages to your care team and make appointments. If you have questions, please call your primary care clinic.  If you do not have a primary care provider, please call [phone] and they will assist you.        Care EveryWhere ID     This is your Care EveryWhere ID. This could be used by other organizations to access your [facility] medical records  [id number]         Blood Pressure from Last 3 Encounters:   05/14/18 145/82   04/13/18 146/84   02/15/18 134/85    Weight from Last 3 Encounters:   05/14/18 97.7 kg (215 lb 4.8 oz)   04/13/18 95.3 kg (210 lb)   02/15/18 95.8 kg (211 lb 1.6 oz)              We Performed the Following     Albumin level     Calcium     Creatinine        Primary Care Provider Office Phone # Fax #    Matilde BENSON Olamide, APRN [phone]       [address] [place] [address]  [facility] [address]        Equal Access to Services     EDEL LAMA : Hadii jacinta sousao Sosukhdev, waaxda luqadaha, qaybta kaalmada adeegyada, cierra daugherty. So [facility] [phone].    ATENCIÓN: Si habla español, tiene a davis disposición servicios gratuitos de asistencia lingüística. Llame al [phone].    We comply with applicable federal civil rights laws and [place] laws. We do not discriminate on the basis of race, color, national origin, age, disability, sex, sexual orientation, or gender identity.            Thank you!     Thank you for choosing [facility]  for your care. Our goal is always to provide you with excellent care. Hearing back from our patients is one way we can continue to improve our services. Please take a few  minutes to complete the written survey that you may receive in the mail after your visit with us. Thank you!             Your Updated Medication List - Protect others around you: Learn how to safely use, store and throw away your medicines at www.disposemymeds.org.          This list is accurate as of 5/14/18 11:56 AM.  Always use your most recent med list.                   Brand Name Dispense Instructions for use Diagnosis    * amLODIPine 5 MG tablet    NORVASC    90 tablet    Take 1 tablet (5 mg) by mouth At Bedtime    Essential hypertension, benign       * amLODIPine 10 MG tablet    NORVASC    90 tablet    Take 1 tablet (10 mg) by mouth At Bedtime AS DIRECTED    Essential hypertension, benign       anastrozole 1 MG tablet    ARIMIDEX    90 tablet    Take 1 tablet (1 mg) by mouth daily    Malignant neoplasm of left breast in female, estrogen receptor positive, unspecified site of breast (H)       atorvastatin 40 MG tablet    LIPITOR    90 tablet    Take 1 tablet (40 mg) by mouth daily Please put on profile- pt just received 90-day supply of cholesterol meds    Pure hypercholesterolemia       * cholecalciferol 1000 UNIT tablet    vitamin D3    100 tablet    Take 1 tablet (1,000 Units) by mouth daily    Osteoporosis       * cholecalciferol 5000 units Caps capsule    vitamin D3    120 capsule    Take 1 capsule (5,000 Units) by mouth daily    Vaginal dryness       CoQ10 200 MG Caps      Take 1 capsule by mouth daily        * diclofenac 1 % Gel topical gel    VOLTAREN    100 g    Apply 4 grams to knees or 2 grams to hands four times daily using enclosed dosing card.    Primary osteoarthritis of both wrists       * VOLTAREN 1 % Gel topical gel   Generic drug:  diclofenac     100 g    Apply 4 grams to knees or 2 grams to hands four times daily using enclosed dosing card.    Right knee pain, unspecified chronicity       gabapentin 300 MG capsule    NEURONTIN    540 capsule    1-2 tablets EVERY 8 HOURS    Neuropathic pain        HYDROcodone-acetaminophen 5-325 MG per tablet    NORCO    40 tablet    Take 1 tablet by mouth every 6 hours as needed    Chronic left-sided low back pain, with sciatica presence unspecified       ketoconazole 2 % shampoo    NIZORAL    120 mL    Apply topically daily as needed for itching or irritation    Dermatitis, seborrheic       ketorolac 0.5 % ophthalmic solution    ACULAR     Apply 1 drop to eye        levothyroxine 112 MCG tablet    SYNTHROID/LEVOTHROID    45 tablet    Take 0.5 tab daily    Hypothyroidism, unspecified type       LORazepam 2 MG tablet    ATIVAN    30 tablet    Take 1 tablet at night for sleep    Sleep disorder       losartan 25 MG tablet    COZAAR    90 tablet    Take 1 tablet (25 mg) by mouth daily    Essential hypertension, benign       metoprolol succinate 25 MG 24 hr tablet    TOPROL-XL    90 tablet    Take 1 tablet (25 mg) by mouth daily    Irregular heart rate       Multi-vitamin Tabs tablet   Generic drug:  multivitamin, therapeutic with minerals      Take 1 tablet by mouth daily.        ofloxacin 0.3 % ophthalmic solution    OCUFLOX     Apply 1 drop to eye        OMEGA-3 FISH OIL PO      Take 1 capsule by mouth daily        omeprazole 20 MG CR capsule    priLOSEC     Take 20 mg by mouth daily        ondansetron 4 MG ODT tab    ZOFRAN-ODT    90 tablet    1-2 tabs po prn every 6 hours    Nausea       * prednisoLONE acetate 1 % ophthalmic susp    PRED FORTE    1 Bottle    Place 1 drop Into the left eye 3 times daily    Postoperative eye state       * prednisoLONE acetate 1 % ophthalmic susp    PRED FORTE    1 Bottle    Place 1 drop into the right eye 3 times daily    Postoperative eye state       REPHRESH Gel     14 Box    Place 2 g vaginally every 3 days    Urinary tract infection, site not specified       terbinafine 1 % cream    lamISIL AT    12 g    Apply topically 2 times daily For fungal infection not resolved with other antifungals (e.g. Clotrimazole)    Tinea pedis of both  feet       triamcinolone 0.1 % ointment    KENALOG    30 g    Apply topically 2 times daily    Eczematous dermatitis       triamterene-hydrochlorothiazide 37.5-25 MG per tablet    MAXZIDE-25    90 tablet    Take 1 tablet by mouth daily    Essential hypertension, benign       * Notice:  This list has 8 medication(s) that are the same as other medications prescribed for you. Read the directions carefully, and ask your doctor or other care provider to review them with you.

## 2018-05-14 NOTE — NURSING NOTE
"Oncology Rooming Note    May 14, 2018 10:38 AM   Nadira Perez is a 67 year old female who presents for:    Chief Complaint   Patient presents with     Blood Draw     labs drawn from IV started by RN     Oncology Clinic Visit     Return: Breast ca      Initial Vitals: /82  Pulse 86  Temp 99.9  F (37.7  C)  Resp 18  Ht 1.702 m (5' 7.01\")  Wt 97.7 kg (215 lb 4.8 oz)  SpO2 96%  BMI 33.71 kg/m2 Estimated body mass index is 33.71 kg/(m^2) as calculated from the following:    Height as of this encounter: 1.702 m (5' 7.01\").    Weight as of this encounter: 97.7 kg (215 lb 4.8 oz). Body surface area is 2.15 meters squared.  Extreme Pain (8) Comment: right knee   No LMP recorded. Patient has had a hysterectomy.  Allergies reviewed: YES  Medications reviewed: YES    Medications: Medication refills not needed today.  Pharmacy name entered into Compass[facility]:    [facility]  [facility] [address] [place] [facility] [address]  HARMONY DARNELL    Clinical concerns: no new concerns.  Pt received flu shot elsewhere. See Immunizations     6 minutes for nursing intake (face to face time)     Loulou Jalloh CMA                "

## 2018-05-14 NOTE — PROGRESS NOTES
[facility]  ONCOLOGY FOLLOW-UP VISIT NOTE    PATIENT NAME: Nadira Perez    YOB: 1950  MRN :[id number]  DATE OF VISIT: May 14, 2018     REASON FOR VISIT : breast cancer follow up.     HISTORY OF PRESENT ILLNESS : The patient is a 66-year-old woman who in 06/2007 had the onset of left-sided breast discomfort that extended into the axilla. She ultimately did undergo mammogram on 06/13/2007, which identified a mass at the 2:30 position in the left breast. Ultrasound also was notable for some skin and areolar complex thickening. She did undergo biopsy of the mass, and this was consistent with an infiltrating ductal carcinoma that was grade 2. The tumor was ER positive, CT positive, and HER2 negative by FISH. She also had a biopsy of left axillary lymph node versus the tail of the axilla, and this was consistent with metastatic carcinoma. Following the diagnosis the patient did have metastatic staging to include a PET CT scan. This demonstrated increased activity in the left breast as well as the lymph nodes, but was otherwise negative. There was also an MRI of the breast performed, and this demonstrated other lesions of the left breast, but no abnormalities on the right.     She initiated neoadjuvant chemotherapy for her inflammatory breast cancer on 07/13/2007. She received  for 3 cycles through 08/24/2007. This was followed by Taxotere for 3 cycles, which was administered 09/14/2007 through 10/26/2007. Following her chemotherapy she did undergo a left-sided mastectomy with axillary lymph node dissection on 11/20/2007 by Dr. Mauro Mei. The patient did have residual carcinoma with a 1.0 cm tumor as well as associated DCIS. Thirteen of 33 lymph nodes were positive, the largest of which was 0.6 cm of tumor infiltration and included extracapsular extension. The patient also had a right-sided prophylactic mastectomy, which did not demonstrate any noninvasive  "or invasive carcinoma. Just prior to her surgery Ismael was placed on Arimidex (start date 11/15/2007). Following her surgery she did have some issues with left chest wall cellulitis as well as some lymphedema of the left upper extremity. She ultimately did go on to receive radiation therapy under the direction of Dr. Nghia Burch. She received radiation to the left chest wall at a dose of 6440 cGy, to the left supraclavicular fossa at a dose of 5040 cGy, and to the left internal mammary chain at a dose of 5080 cGy. Last dose of radiation was administered on 03/07/2008.     INTERVAL HISTORY:  Nadira is here for routine visit.   Ms. Perez completed 10 years of endocrine therapy with Arimidex and then tamoxifen.  In 11/2017, she then went back on Arimidex for a prolonged extended therapy as per the MA17 trial.      She says she is overall tolerating it well.  No issues with hot flashes.  She continues to have intermittent arthralgias, worse in the right knee than the left knee.  No other joint pains.  She also had a recent fall on her right knee.  This was work related and she has been doing therapy for that.  She is scheduled to see Orthopedics later today.      No fevers or chills, no chest pain or shortness of breath, no nausea.  Occasionally, she will describe as she swallows pills incorrectly will notice 1 or 2 episodes of vomiting.  No new medical problems.  She continues to have intermittent neuropathy.      REVIEW OF SYSTEMS:  Her 10-point review of systems is otherwise negative.       PHYSICAL EXAMINATION:   /82  Pulse 86  Temp 99.9  F (37.7  C)  Resp 18  Ht 1.702 m (5' 7.01\")  Wt 97.7 kg (215 lb 4.8 oz)  SpO2 96%  BMI 33.71 kg/m2  Wt Readings from Last 5 Encounters:   05/14/18 97.7 kg (215 lb 4.8 oz)   04/13/18 95.3 kg (210 lb)   02/15/18 95.8 kg (211 lb 1.6 oz)   12/21/17 95.3 kg (210 lb)   11/29/17 95.3 kg (210 lb)     GENERAL : Alert and oriented , not in distress .   HEENT: Normocephalic " head.  Anicteric sclerae.No oral ulceration.   NECK: Supple, no thyromegaly.   LYMPH:  No cervical, supraclavicular, axillary, epitrochlear, or inguinal lymphadenopathy.  BREAST: She is status post bilateral mastectomies without reconstruction. No chest wall mass or concerning lesions identified. Multiple chest telangectasias in the left side.  There are no dominant masses on palpation of the chest wall, along the mastectomy scars or into the axilla bilaterally.   LUNGS: Clear breath sounds bilaterally, no wheezing, no crackles.    CARDIOVASCULAR: S1 and S2 well heard, regular rate and rhythm, no murmur or gallop. JVP absent.    ABDOMEN: Soft, nontender, positive bowel sounds. No organomegaly was appreciated.  EXTREMITIES: No cyanosis, no clubbing, no edema.    SKIN: No skin rash, no purpura or petechiae.  palpable 2 cm round movable lipoma posterior right torso  NEUROLOGIC: Normal mental status. Alert and oriented .Cranial nerves II through XII grossly intact.  No focal weakness. Sensory examination grossly intact.  Normal coordination( normal finger-to-nose touching) .  Romberg`s sign is negative. She was observed while she was walking and no significant abnormalities noted.     Lab Results   Component Value Date    WBC 8.1 02/15/2018     Lab Results   Component Value Date    RBC 4.38 02/15/2018     Lab Results   Component Value Date    HGB 12.1 02/15/2018     Lab Results   Component Value Date    HCT 38.0 02/15/2018     No components found for: MCT  Lab Results   Component Value Date    MCV 87 02/15/2018     Lab Results   Component Value Date    MCH 27.6 02/15/2018     Lab Results   Component Value Date    MCHC 31.8 02/15/2018     Lab Results   Component Value Date    RDW 15.6 02/15/2018     Lab Results   Component Value Date     02/15/2018       ASSESSMENT AND PLAN:  1. Inflammatory breast cancer, stage IIIC, the left breast, ER positive, HER2 negative, status post neoadjuvant chemotherapy followed by  bilateral mastectomies. Chest wall radiotherapy completed in 03/2008. She was on adjuvant Arimidex from November 2007 until November 2012, and then was switched to tamoxifen. She'll complete 10 years of adjuvant hormonal therapy in November 2017.  At that time, she was switched back to arimidex per MA 17 trial.  She is tolerating this well.    2. Gait imbalance : Unchanged.  Normal brain MRI in November.  Continue pilates and PT.    3. Osteoporosis. Adjuvant zometa today.    4. Vaginal dryness - on replens.       5. Insomnia.   Discussed good sleep hygiene.     6. Lipoma - growing on posterior torse.  Will arrange for us and referral to general surgery.    Khushbu Louise MD

## 2018-05-14 NOTE — MR AVS SNAPSHOT
After Visit Summary   5/14/2018    Nadira Perez    MRN: [id number]           Patient Information     Date Of Birth          1950        Visit Information        Provider Department      5/14/2018 10:00 AM Khushbu Louise MD [facility]        Today's Diagnoses     Lipoma of torso    -  1    Hypothyroidism        Malignant neoplasm of breast in female, estrogen receptor positive, unspecified laterality, unspecified site of breast (H)           Follow-ups after your visit        Additional Services     GENERAL SURG ADULT REFERRAL       Your provider has referred you to: [facility]: General Surgery Clinic [facility] [phone]   [email or web address]    Please be aware that coverage of these services is subject to the terms and limitations of your health insurance plan.  Call member services at your health plan with any benefit or coverage questions.      Please bring the following with you to your appointment:    (1) Any X-Rays, CTs or MRIs which have been performed.  Contact the facility where they were done to arrange for  prior to your scheduled appointment.   (2) List of current medications   (3) This referral request   (4) Any documents/labs given to you for this referral                  Your next 10 appointments already scheduled     May 24, 2018  1:00 PM CDT   (Arrive by 12:45 PM)   SHORT with Kisha Heller RPH   [facility] Medication Therapy Management ([facility])    [address] [place] [address]  [address] [facility] [id number] [phone]            Jun 07, 2018  8:00 AM CDT   US ABDOMEN COMPLETE with UCUS3   [facility] US ([facility])    [address] [place] [address]  [facility] [facility] [id number] [phone]           Please bring a list of your medicines (including vitamins, minerals and over-the-counter drugs). Also, tell your doctor about any  allergies you may have. Wear comfortable clothes and leave your valuables at home.  Adults: No eating or drinking for 8 hours before the exam. You may take medicine with a small sip of water.  Children: - Children 6+ years: No food or drink for 6 hours before exam. - Children 1-5 years: No food or drink for 4 hours before exam. - Infants, breast-fed: may have breast milk up to 2 hours before exam. - Infants, formula: may have bottle until 4 hours before exam.  Please call the Imaging Department at your exam site with any questions.            Jun 07, 2018  9:30 AM CDT   (Arrive by 9:15 AM)   New Patient Visit with Dhiraj Encarnacion MD   [facility] ([facility])    [address] [place] [address]  4th Floor  [facility] [address] [phone]            Jun 25, 2018  1:15 PM CDT   RETURN CORNEA with Tyson Ruby MD   [facility]    [address]  [facility] [address]   [phone]            Nov 12, 2018  9:30 AM CST   [facility] Lab Draw with  [facility] LAB DRAW   [facility] Lab Draw ([facility])    [address]  [address]  [facility] [id number] [phone]            Nov 12, 2018 10:00 AM CST   (Arrive by 9:45 AM)   Return Visit with Khushbu Louise MD   [facility] ([facility])    [address]  [address]  [facility] [id number] [phone]            Nov 12, 2018 10:30 AM CST   Infusion 60 with  ONCOLOGY INFUSION, UC 31 ATC   [facility] ([facility])    [address] [place] [address]  [address]  [facility] [id number] [phone]              Who to contact     If you have questions or need follow up information about today's clinic visit or your schedule please contact [facility] directly at [phone].  Normal or non-critical lab and  "imaging results will be communicated to you by MyChart, letter or phone within 4 business days after the clinic has received the results. If you do not hear from us within 7 days, please contact the clinic through [facility] [employer] or phone. If you have a critical or abnormal lab result, we will notify you by phone as soon as possible.  Submit refill requests through [employer] or call your pharmacy and they will forward the refill request to us. Please allow 3 business days for your refill to be completed.          Additional Information About Your Visit        [employer] Information     [employer] gives you secure access to your electronic health record. If you see a primary care provider, you can also send messages to your care team and make appointments. If you have questions, please call your primary care clinic.  If you do not have a primary care provider, please call [phone] and they will assist you.        Care EveryWhere ID     This is your Care EveryWhere ID. This could be used by other organizations to access your [facility] medical records  [id number]        Your Vitals Were     Pulse Temperature Respirations Height Pulse Oximetry BMI (Body Mass Index)    86 99.9  F (37.7  C) 18 1.702 m (5' 7.01\") 96% 33.71 kg/m2       Blood Pressure from Last 3 Encounters:   05/14/18 145/82   05/14/18 145/82   04/13/18 146/84    Weight from Last 3 Encounters:   05/14/18 97.5 kg (215 lb)   05/14/18 97.7 kg (215 lb 4.8 oz)   04/13/18 95.3 kg (210 lb)              We Performed the Following     GENERAL SURG ADULT REFERRAL     TSH with free T4 reflex        Primary Care Provider Office Phone # Fax #    Matilde MERCEDES Taylor [phone]       [address]  [facility] [address]        Equal Access to Services     EDEL LAMA : Sonali Russo, lynda manuel, cierra penny. So [facility] [phone].    ATENCIÓN: Si luca hopkins " disposición servicios gratuitos de asistencia lingüística. Garrick marvin [phone].    We comply with applicable federal civil rights laws and [place] laws. We do not discriminate on the basis of race, color, national origin, age, disability, sex, sexual orientation, or gender identity.            Thank you!     Thank you for choosing [facility]  for your care. Our goal is always to provide you with excellent care. Hearing back from our patients is one way we can continue to improve our services. Please take a few minutes to complete the written survey that you may receive in the mail after your visit with us. Thank you!             Your Updated Medication List - Protect others around you: Learn how to safely use, store and throw away your medicines at www.disposemymeds.org.          This list is accurate as of 5/14/18 11:59 PM.  Always use your most recent med list.                   Brand Name Dispense Instructions for use Diagnosis    * amLODIPine 5 MG tablet    NORVASC    90 tablet    Take 1 tablet (5 mg) by mouth At Bedtime    Essential hypertension, benign       * amLODIPine 10 MG tablet    NORVASC    90 tablet    Take 1 tablet (10 mg) by mouth At Bedtime AS DIRECTED    Essential hypertension, benign       anastrozole 1 MG tablet    ARIMIDEX    90 tablet    Take 1 tablet (1 mg) by mouth daily    Malignant neoplasm of left breast in female, estrogen receptor positive, unspecified site of breast (H)       atorvastatin 40 MG tablet    LIPITOR    90 tablet    Take 1 tablet (40 mg) by mouth daily Please put on profile- pt just received 90-day supply of cholesterol meds    Pure hypercholesterolemia       * cholecalciferol 1000 UNIT tablet    vitamin D3    100 tablet    Take 1 tablet (1,000 Units) by mouth daily    Osteoporosis       * cholecalciferol 5000 units Caps capsule    vitamin D3    120 capsule    Take 1 capsule (5,000 Units) by mouth daily    Vaginal dryness       CoQ10 200 MG Caps       Take 1 capsule by mouth daily        * diclofenac 1 % Gel topical gel    VOLTAREN    100 g    Apply 4 grams to knees or 2 grams to hands four times daily using enclosed dosing card.    Primary osteoarthritis of both wrists       * VOLTAREN 1 % Gel topical gel   Generic drug:  diclofenac     100 g    Apply 4 grams to knees or 2 grams to hands four times daily using enclosed dosing card.    Right knee pain, unspecified chronicity       gabapentin 300 MG capsule    NEURONTIN    540 capsule    1-2 tablets EVERY 8 HOURS    Neuropathic pain       HYDROcodone-acetaminophen 5-325 MG per tablet    NORCO    40 tablet    Take 1 tablet by mouth every 6 hours as needed    Chronic left-sided low back pain, with sciatica presence unspecified       ketoconazole 2 % shampoo    NIZORAL    120 mL    Apply topically daily as needed for itching or irritation    Dermatitis, seborrheic       ketorolac 0.5 % ophthalmic solution    ACULAR     Apply 1 drop to eye        levothyroxine 112 MCG tablet    SYNTHROID/LEVOTHROID    45 tablet    Take 0.5 tab daily    Hypothyroidism, unspecified type       LORazepam 2 MG tablet    ATIVAN    30 tablet    Take 1 tablet at night for sleep    Sleep disorder       losartan 25 MG tablet    COZAAR    90 tablet    Take 1 tablet (25 mg) by mouth daily    Essential hypertension, benign       metoprolol succinate 25 MG 24 hr tablet    TOPROL-XL    90 tablet    Take 1 tablet (25 mg) by mouth daily    Irregular heart rate       Multi-vitamin Tabs tablet   Generic drug:  multivitamin, therapeutic with minerals      Take 1 tablet by mouth daily.        ofloxacin 0.3 % ophthalmic solution    OCUFLOX     Apply 1 drop to eye        OMEGA-3 FISH OIL PO      Take 1 capsule by mouth daily        omeprazole 20 MG CR capsule    priLOSEC     Take 20 mg by mouth daily        ondansetron 4 MG ODT tab    ZOFRAN-ODT    90 tablet    1-2 tabs po prn every 6 hours    Nausea       * prednisoLONE acetate 1 % ophthalmic susp    PRED  FORTE    1 Bottle    Place 1 drop Into the left eye 3 times daily    Postoperative eye state       * prednisoLONE acetate 1 % ophthalmic susp    PRED FORTE    1 Bottle    Place 1 drop into the right eye 3 times daily    Postoperative eye state       REPHRESH Gel     14 Box    Place 2 g vaginally every 3 days    Urinary tract infection, site not specified       terbinafine 1 % cream    lamISIL AT    12 g    Apply topically 2 times daily For fungal infection not resolved with other antifungals (e.g. Clotrimazole)    Tinea pedis of both feet       triamcinolone 0.1 % ointment    KENALOG    30 g    Apply topically 2 times daily    Eczematous dermatitis       triamterene-hydrochlorothiazide 37.5-25 MG per tablet    MAXZIDE-25    90 tablet    Take 1 tablet by mouth daily    Essential hypertension, benign       * Notice:  This list has 8 medication(s) that are the same as other medications prescribed for you. Read the directions carefully, and ask your doctor or other care provider to review them with you.

## 2018-05-14 NOTE — PROGRESS NOTES
"[facility] Note      Patient is a 67 year old female who presents to the office today for: right knee pain. Fell on left side and twisted right knee in fall in 4/12/18. Had some swelling in medial knee. Had xray by pcp on 4/13/18  that were reportedly normal. Given voltaren gel which has helped some. Has continued to have pain with walking, movement. No pain at rest. Pain medially.     Denies weakness, numbness, tingling, clicking, locking, or catching.      Past Medical History, Current Medications, and Allergies are reviewed in the electronic medical record as appropriate.     ROS: Pertinent items are noted in HPI.  Constitutional: negative for fevers, chills and malaise  Cardiovascular: negative for dyspnea, fatigue, lower extremity edema  Integument/breast: negative for rash, skin lesion(s) and skin color change  Neurological: negative for paresthesia and weakness      EXAM:/82  Pulse 86  Ht 5' 7\" (1.702 m)  Wt 215 lb (97.5 kg)  BMI 33.67 kg/m2    Patient is alert, No acute distress, pleasant and conversational.    Gait: antalgic.    right knee:   Skin intact. No erythema or ecchymosis.  No effusion or soft tissue swelling.     AROM: Zero to approximately 120  before limited by pain    Palpation:   TTP medially, walt over medial joint line.   No medial or lateral facet joint tenderness.  No lateral joint line tenderness     Special Tests:  Negative bounce test, + forced flexion and +  Mackenzie's.  No ligamentous laxity or pain with valgus or varus stress.  Negative Lachman's, Anterior Drawer and Posterior Drawer     Full Isometric quad strength, extensor mechanism in place     Neurovascularly intact in the lower extremity    Hip and Ankle with full AROM and nontender      Imaging: Xray right knee performed in pcp office on 4/13/18 and reviewed demonstrating mild medial and patellofemoral djd. No acute changes.       Assessment: Patient is a 67 year old female with " right medial knee pain after injury one month ago. Some djd on xray but symptoms concerning for mensical injury.     Recommendations:   Reviewed imaging and assessment with patient in detail  Discussed treatment of meniscal injuries.   Recommended continued use of voltaren and tylenol  Given HEP and referred to PT  Given knee brace for comfort  Follow up in 4-6 weeks if not improving    Bob Arredondo MD

## 2018-05-14 NOTE — NURSING NOTE
Chief Complaint   Patient presents with     Blood Draw     labs drawn from IV started by RN     PIV started in right hand, labs drawn, flushed with NS.  Patient checked in for provider visit.  Jennifer Dewitt RN

## 2018-05-14 NOTE — PROGRESS NOTES
Infusion Nursing Note:  Nadira Perez presents today for zometa.    Patient seen by provider today: Yes: Dr Calderón   present during visit today: Not Applicable.    Note: Patient does not take calcium supplements due to Hx of hypercalcemia.    Intravenous Access:  Peripheral IV placed in lab    Treatment Conditions:  Lab Results   Component Value Date     02/15/2018                   Lab Results   Component Value Date    POTASSIUM 3.2 02/15/2018           Lab Results   Component Value Date    MAG 2.2 06/11/2009            Lab Results   Component Value Date    CR 0.52 05/14/2018                   Lab Results   Component Value Date    RAMBO 9.3 05/14/2018                Lab Results   Component Value Date    BILITOTAL 0.3 11/21/2017           Lab Results   Component Value Date    ALBUMIN 4.0 05/14/2018                    Lab Results   Component Value Date    ALT 38 11/21/2017           Lab Results   Component Value Date    AST 27 11/21/2017       Results reviewed, labs MET treatment parameters, ok to proceed with treatment.      Post Infusion Assessment:  Patient tolerated infusion without incident.  Blood return noted pre and post infusion.  Site patent and intact, free from redness, edema or discomfort.  No evidence of extravasations.  Access discontinued per protocol.    Discharge Plan:   Patient declined prescription refills.  Discharge instructions reviewed with: Patient.  Patient and/or family verbalized understanding of discharge instructions and all questions answered.  Copy of AVS reviewed with patient and/or family.  Patient will return 11/12/18 for next appointment.  Patient discharged in stable condition accompanied by: self.  Departure Mode: Ambulatory.  Face to Face time: 0.    Zometa stop time: 1155    Becca Poon RN

## 2018-05-14 NOTE — PATIENT INSTRUCTIONS
Contact Numbers    [facility] Main Line: [phone]  [facility] Triage:  [phone]    Call triage with chills and/or temperature greater than or equal to 100.5, uncontrolled nausea/vomiting, diarrhea, constipation, dizziness, shortness of breath, chest pain, bleeding, unexplained bruising, or any new/concerning symptoms, questions/concerns.     If you are having any concerning symptoms or wish to speak to a provider before your next infusion visit, please call your care coordinator or triage to notify them so we can adequately serve you.       After Hours: [phone]    If after hours, weekends, or holidays, call main hospital  and ask for Oncology doctor on call.           May 2018   Krish Monday Tuesday Wednesday Thursday Friday Saturday             1     2     3     4     5       6     7     8     9     10     11     12       13     14     [facility]    9:30 AM   (15 min.)    [facility]   [facility] Lab Draw     [facility] RETURN    9:45 AM   (30 min.)   Khushbu Louise MD   [facility]     [facility] ONC INFUSION 60   10:30 AM   (60 min.)   [facility] ONCOLOGY INFUSION   [facility] 15     16     [facility] RETURN [facility]A    1:15 PM   (15 min.)   Tyson Ruby MD   [facility] 17     18     19       20     21     22     23     24     25     26       27     28     29     30     31                          June 2018 Sunday Monday Tuesday Wednesday Thursday Friday Saturday                            1     2       3     4     5     6     7     8     9       10     11     12     13     14     15     16       17     18     19     20     21     22     23       24     25     26     27     28  Happy Birthday!     29     30                 Recent Results (from the past 24 hour(s))   Creatinine    Collection Time: 05/14/18 10:28 AM   Result Value Ref Range    Creatinine 0.52 0.52 - 1.04 mg/dL    GFR Estimate >90 >60 mL/min/1.7m2    GFR Estimate If Black >90 >60 mL/min/1.7m2    Calcium    Collection Time: 05/14/18 10:28 AM   Result Value Ref Range    Calcium 9.3 8.5 - 10.1 mg/dL   Albumin level    Collection Time: 05/14/18 10:28 AM   Result Value Ref Range    Albumin 4.0 3.4 - 5.0 g/dL   TSH with free T4 reflex    Collection Time: 05/14/18 10:29 AM   Result Value Ref Range    TSH 3.06 0.40 - 4.00 mU/L

## 2018-05-14 NOTE — PROGRESS NOTES
SPORTS & ORTHOPEDIC WALK-IN VISIT 5/14/2018    Primary Care Physician: Dr. Todd  Had a fall at work 4/12/18 when foot got caught on a step. May have twisted the knee when she fell. Saw  on 4/13/18 XR taken. Rx: Voltaren gel  Reason for visit:     What part of your body is injured / painful?  right knee    What caused the injury /pain? Fall    How long ago did your injury occur or pain begin? about a month ago    What are your most bothersome symptoms? Pain and Swelling    How would you characterize your symptom?  aching and sharp    What makes your symptoms better? Rest and Ice, NSAIDS    What makes your symptoms worse? Standing, Walking and Movement    Have you been previously seen for this problem? Yes, PCP    Medical History:    Any recent changes to your medical history? No    Any new medication prescribed since last visit? No    Have you had surgery on this body part before? No    Social History:    Occupation: RN    Handedness: Right    Exercise: None    Review of Systems:    Do you have fever, chills, weight loss? No    Do you have any vision problems? No    Do you have any chest pain or edema? No    Do you have any shortness of breath or wheezing?  No    Do you have stomach problems? Yes, Gastritis    Do you have any numbness or focal weakness? No    Do you have diabetes? No    Do you have problems with bleeding or clotting? No    Do you have an rashes or other skin lesions? No

## 2018-05-14 NOTE — MR AVS SNAPSHOT
After Visit Summary   5/14/2018    Nadira Perez    MRN: [id number]           Patient Information     Date Of Birth          1950        Visit Information        Provider Department      5/14/2018 12:30 PM Bob Arredondo MD [facility]        Today's Diagnoses     Acute pain of right knee    -  1       Follow-ups after your visit        Additional Services     [facility] PT, HAND, AND CHIROPRACTIC REFERRAL       Physical Therapy Referral                  Your next 10 appointments already scheduled     May 16, 2018  1:15 PM CDT   RETURN CORNEA with Tyson Ruby MD   Eye Clinic ([facility])    [address]  9th [address] Clin [address]a  [facility] [address] [phone]            Jun 07, 2018  8:00 AM CDT   US ABDOMEN COMPLETE with UCUS3   [facility] Imaging Center US ([facility])    [address]  [address] [facility] [id number] [phone]           Please bring a list of your medicines (including vitamins, minerals and over-the-counter drugs). Also, tell your doctor about any allergies you may have. Wear comfortable clothes and leave your valuables at home.  Adults: No eating or drinking for 8 hours before the exam. You may take medicine with a small sip of water.  Children: - Children 6+ years: No food or drink for 6 hours before exam. - Children 1-5 years: No food or drink for 4 hours before exam. - Infants, breast-fed: may have breast milk up to 2 hours before exam. - Infants, formula: may have bottle until 4 hours before exam.  Please call the Imaging Department at your exam site with any questions.            Jun 07, 2018  9:30 AM CDT   (Arrive by 9:15 AM)   New Patient Visit with Dhiraj Encarnacion MD   [facility] General Surgery ([facility])    [address]  4th Floor  [facility] [id number] [phone]            Nov 12, 2018   9:30 AM CST   [facility] Draw with [facility]   [facility] Lab Draw ([facility])    [address]  [address]  [facility] [address] [phone]            Nov 12, 2018 10:00 AM CST   (Arrive by 9:45 AM)   Return Visit with Khushbu Louise MD   [facility] ([facility])    [address]  [address]  [facility] [address] [phone]            Nov 12, 2018 10:30 AM CST   Infusion 60 with [facility] ONCOLOGY INFUSION, UC 31 ATC   [facility] ([facility])    [address]  [address]  [facility] [id number] [phone]              Future tests that were ordered for you today     Open Future Orders        Priority Expected Expires Ordered    Corneal Topography OD (right eye) Routine  11/13/2019 5/15/2018            Who to contact     Please call your clinic at [phone] to:    Ask questions about your health    Make or cancel appointments    Discuss your medicines    Learn about your test results    Speak to your doctor            Additional Information About Your Visit        [employer] Information     [employer] gives you secure access to your electronic health record. If you see a primary care provider, you can also send messages to your care team and make appointments. If you have questions, please call your primary care clinic.  If you do not have a primary care provider, please call [phone] and they will assist you.      [employer] is an electronic gateway that provides easy, online access to your medical records. With [employer], you can request a clinic appointment, read your test results, renew a prescription or communicate with your care team.     To access your existing account, please contact your [facility] or call [phone] for assistance.        Care EveryWhere ID     This is your Care EveryWhere ID. This could be  "used by other organizations to access your [facility] medical records  [id number]        Your Vitals Were     Pulse Height BMI (Body Mass Index)             86 5' 7\" (1.702 m) 33.67 kg/m2          Blood Pressure from Last 3 Encounters:   05/14/18 145/82   05/14/18 145/82   04/13/18 146/84    Weight from Last 3 Encounters:   05/14/18 215 lb (97.5 kg)   05/14/18 215 lb 4.8 oz (97.7 kg)   04/13/18 210 lb (95.3 kg)              We Performed the Following     [facility] PT, HAND, AND CHIROPRACTIC REFERRAL        Primary Care Provider Office Phone # Fax #    Matilde Quiñonez, APRN [phone]       [address] [place] [address] [address] [place] [id number]        Equal Access to Services     EDEL LAMA : Hadii aad ku hadasho Soomaali, waaxda luqadaha, qaybta kaalmada adeegyada, cierra noin hayjuan seals . So [facility] [phone].    ATENCIÓN: Si habla español, tiene a davis disposición servicios gratuitos de asistencia lingüística. Garrick al [phone].    We comply with applicable federal civil rights laws and [place] laws. We do not discriminate on the basis of race, color, national origin, age, disability, sex, sexual orientation, or gender identity.            Thank you!     Thank you for choosing [facility] SPORTS AND ORTHOPAEDIC WALK IN CLINIC  for your care. Our goal is always to provide you with excellent care. Hearing back from our patients is one way we can continue to improve our services. Please take a few minutes to complete the written survey that you may receive in the mail after your visit with us. Thank you!             Your Updated Medication List - Protect others around you: Learn how to safely use, store and throw away your medicines at www.disposemymeds.org.          This list is accurate as of 5/14/18 11:59 PM.  Always use your most recent med list.                   Brand Name Dispense Instructions for use Diagnosis    * amLODIPine 5 MG tablet    NORVASC    90 tablet    Take 1 tablet (5 " mg) by mouth At Bedtime    Essential hypertension, benign       * amLODIPine 10 MG tablet    NORVASC    90 tablet    Take 1 tablet (10 mg) by mouth At Bedtime AS DIRECTED    Essential hypertension, benign       anastrozole 1 MG tablet    ARIMIDEX    90 tablet    Take 1 tablet (1 mg) by mouth daily    Malignant neoplasm of left breast in female, estrogen receptor positive, unspecified site of breast (H)       atorvastatin 40 MG tablet    LIPITOR    90 tablet    Take 1 tablet (40 mg) by mouth daily Please put on profile- pt just received 90-day supply of cholesterol meds    Pure hypercholesterolemia       * cholecalciferol 1000 UNIT tablet    vitamin D3    100 tablet    Take 1 tablet (1,000 Units) by mouth daily    Osteoporosis       * cholecalciferol 5000 units Caps capsule    vitamin D3    120 capsule    Take 1 capsule (5,000 Units) by mouth daily    Vaginal dryness       CoQ10 200 MG Caps      Take 1 capsule by mouth daily        * diclofenac 1 % Gel topical gel    VOLTAREN    100 g    Apply 4 grams to knees or 2 grams to hands four times daily using enclosed dosing card.    Primary osteoarthritis of both wrists       * VOLTAREN 1 % Gel topical gel   Generic drug:  diclofenac     100 g    Apply 4 grams to knees or 2 grams to hands four times daily using enclosed dosing card.    Right knee pain, unspecified chronicity       gabapentin 300 MG capsule    NEURONTIN    540 capsule    1-2 tablets EVERY 8 HOURS    Neuropathic pain       HYDROcodone-acetaminophen 5-325 MG per tablet    NORCO    40 tablet    Take 1 tablet by mouth every 6 hours as needed    Chronic left-sided low back pain, with sciatica presence unspecified       ketoconazole 2 % shampoo    NIZORAL    120 mL    Apply topically daily as needed for itching or irritation    Dermatitis, seborrheic       ketorolac 0.5 % ophthalmic solution    ACULAR     Apply 1 drop to eye        levothyroxine 112 MCG tablet    SYNTHROID/LEVOTHROID    45 tablet    Take 0.5 tab  daily    Hypothyroidism, unspecified type       LORazepam 2 MG tablet    ATIVAN    30 tablet    Take 1 tablet at night for sleep    Sleep disorder       losartan 25 MG tablet    COZAAR    90 tablet    Take 1 tablet (25 mg) by mouth daily    Essential hypertension, benign       metoprolol succinate 25 MG 24 hr tablet    TOPROL-XL    90 tablet    Take 1 tablet (25 mg) by mouth daily    Irregular heart rate       Multi-vitamin Tabs tablet   Generic drug:  multivitamin, therapeutic with minerals      Take 1 tablet by mouth daily.        ofloxacin 0.3 % ophthalmic solution    OCUFLOX     Apply 1 drop to eye        OMEGA-3 FISH OIL PO      Take 1 capsule by mouth daily        omeprazole 20 MG CR capsule    priLOSEC     Take 20 mg by mouth daily        ondansetron 4 MG ODT tab    ZOFRAN-ODT    90 tablet    1-2 tabs po prn every 6 hours    Nausea       * prednisoLONE acetate 1 % ophthalmic susp    PRED FORTE    1 Bottle    Place 1 drop Into the left eye 3 times daily    Postoperative eye state       * prednisoLONE acetate 1 % ophthalmic susp    PRED FORTE    1 Bottle    Place 1 drop into the right eye 3 times daily    Postoperative eye state       REPHRESH Gel     14 Box    Place 2 g vaginally every 3 days    Urinary tract infection, site not specified       terbinafine 1 % cream    lamISIL AT    12 g    Apply topically 2 times daily For fungal infection not resolved with other antifungals (e.g. Clotrimazole)    Tinea pedis of both feet       triamcinolone 0.1 % ointment    KENALOG    30 g    Apply topically 2 times daily    Eczematous dermatitis       triamterene-hydrochlorothiazide 37.5-25 MG per tablet    MAXZIDE-25    90 tablet    Take 1 tablet by mouth daily    Essential hypertension, benign       * Notice:  This list has 8 medication(s) that are the same as other medications prescribed for you. Read the directions carefully, and ask your doctor or other care provider to review them with you.

## 2018-05-15 DIAGNOSIS — Z96.1 PSEUDOPHAKIA OF BOTH EYES: Primary | ICD-10-CM

## 2018-05-16 ENCOUNTER — OFFICE VISIT (OUTPATIENT)
Dept: OPHTHALMOLOGY | Facility: CLINIC | Age: 68
End: 2018-05-16
Attending: OPHTHALMOLOGY
Payer: MEDICARE

## 2018-05-16 DIAGNOSIS — Z98.890 HISTORY OF RADIAL KERATOTOMY: ICD-10-CM

## 2018-05-16 DIAGNOSIS — H18.529 ANTERIOR BASEMENT MEMBRANE DYSTROPHY: ICD-10-CM

## 2018-05-16 DIAGNOSIS — H52.212 IRREGULAR ASTIGMATISM OF LEFT EYE: Primary | ICD-10-CM

## 2018-05-16 DIAGNOSIS — Z96.1 PSEUDOPHAKIA OF BOTH EYES: ICD-10-CM

## 2018-05-16 PROCEDURE — G0463 HOSPITAL OUTPT CLINIC VISIT: HCPCS | Mod: ZF

## 2018-05-16 PROCEDURE — 92025 CPTRIZED CORNEAL TOPOGRAPHY: CPT | Mod: ZF | Performed by: OPHTHALMOLOGY

## 2018-05-16 ASSESSMENT — CONF VISUAL FIELD
OS_NORMAL: 1
OD_NORMAL: 1
METHOD: COUNTING FINGERS

## 2018-05-16 ASSESSMENT — VISUAL ACUITY
OS_SC: 20/40
METHOD: SNELLEN - LINEAR
OS_SC+: -2
OD_SC+: +2
OS_PH_SC: 20/25
OD_SC: 20/30

## 2018-05-16 ASSESSMENT — EXTERNAL EXAM - LEFT EYE: OS_EXAM: NORMAL

## 2018-05-16 ASSESSMENT — SLIT LAMP EXAM - LIDS
COMMENTS: NORMAL
COMMENTS: NORMAL

## 2018-05-16 ASSESSMENT — TONOMETRY
OD_IOP_MMHG: 14
OS_IOP_MMHG: 16
IOP_METHOD: ICARE

## 2018-05-16 ASSESSMENT — EXTERNAL EXAM - RIGHT EYE: OD_EXAM: NORMAL

## 2018-05-16 NOTE — PROGRESS NOTES
CC: s/p CE/IOL OU    HPI: 68 yo CF s/p CE/IOL OS. Surgery complicated by opening of RK wounds temporally. Resutured. Doing well, denies any pain, feels like vision is improving. No flashes, diplopia. She has noticed more floaters OD    Interval:  Patient is happy with vision. She reports that vision is stronger in OD than OS. Reports floaters both eyes which are not worsening. Denies new flashes.     She is anxious to have sutures out.    POHx:  H/o RK  S/p CE/IOL OS 2/20/18  S/p CE/IOL OD 3/13/18    Gtts:  Finished drops     Assessment / Plan:    1. S/p CE/IOL both eyes    K chato today OD  K suture removal OD x 2  Ofloxacin four times a day x 3 days OD    RTC in 1 month with K chato for additional suture removal    APOORVA Rodriguez  Cornea fellow    ~~~~~~~~~~~~~~~~~~~~~~~~~~~~~~~~~~~~~~~~~~~~~~~~~~~~~~~~~~~~~~~~    Complete documentation of historical and exam elements from today's encounter can be found in the full encounter summary report (not reduplicated in this progress note). I personally obtained the chief complaint(s) and history of present illness.  I confirmed and edited as necessary the review of systems, past medical/surgical history, family history, social history, and examination findings as documented by others; and I examined the patient myself. I personally reviewed the relevant tests, images, and reports as documented above. I formulated and edited as necessary the assessment and plan and discussed the findings and management plan with the patient and family.    I personally viewed the [imaging] and I agree with the interpretation as documented by the resident/fellow and edited by me as appropriate.    Tyson Ruby MD

## 2018-05-16 NOTE — MR AVS SNAPSHOT
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        History of radial keratotomy        Pseudophakia of both eyes           Follow-ups after your visit        Your next 10 appointments already scheduled     May 24, 2018  1:00 PM CDT   (Arrive by 12:45 PM)   SHORT with Kisha Heller [facility] ([facility])    [address] [place] [address]  4th [facility] [address] [phone]            Jun 07, 2018  8:00 AM CDT   US ABDOMEN COMPLETE with UCUS3   [facility] US ([facility])    [address] [place] [id number] [phone]           Please bring a list of your medicines (including vitamins, minerals and over-the-counter drugs). Also, tell your doctor about any allergies you may have. Wear comfortable clothes and leave your valuables at home.  Adults: No eating or drinking for 8 hours before the exam. You may take medicine with a small sip of water.  Children: - Children 6+ years: No food or drink for 6 hours before exam. - Children 1-5 years: No food or drink for 4 hours before exam. - Infants, breast-fed: may have breast milk up to 2 hours before exam. - Infants, formula: may have bottle until 4 hours before exam.  Please call the Imaging Department at your exam site with any questions.            Jun 07, 2018  9:30 AM CDT   (Arrive by 9:15 AM)   New Patient Visit with Dhiraj Encarnacion MD   [facility] General Surgery ([facility])    [address] [place] [address] [phone]             Jun 25, 2018  1:15 PM CDT   RETURN CORNEA with Tyson Ruby MD   Eye Clinic ([facility])    [address]  [address] Clin 9a  [facility] [address]   [phone]            Nov 12, 2018  9:30 AM CST   [facility] Lab Draw with UC MASONIC LAB DRAW   [facility] Lab Draw ([facility])    [address]  [address]  [facility] [address] [phone]            Nov 12, 2018 10:00 AM CST   (Arrive by 9:45 AM)   Return Visit with Khushbu Louise MD   [facility] Clinic ([facility])    [address]  [address]  [facility] [id number] [phone]            Nov 12, 2018 10:30 AM CST   Infusion 60 with UC ONCOLOGY INFUSION, UC 31 ATC   [facility] ([facility])    [address]  [address]  [facility] [id number] [phone]              Future tests that were ordered for you today     Open Future Orders        Priority Expected Expires Ordered    Corneal Topography OD (right eye) Routine  11/13/2019 5/15/2018            Who to contact     Please call your clinic at [phone] to:    Ask questions about your health    Make or cancel appointments    Discuss your medicines    Learn about your test results    Speak to your doctor            Additional Information About Your Visit        MyChart Information     [employer] gives you secure access to your electronic health record. If you see a primary care provider, you can also send messages to your care team and make appointments. If you have questions, please call your primary care clinic.  If you do not have a primary care provider, please call [phone] and they will assist you.      [employer] is an electronic gateway that provides easy, online access to your medical records. With [employer], you can request a clinic appointment, read your test results, renew a prescription or  communicate with your care team.     To access your existing account, please contact your [facility] or call [phone] for assistance.        Care EveryWhere ID     This is your Care EveryWhere ID. This could be used by other organizations to access your [place] medical records  [id number]         Blood Pressure from Last 3 Encounters:   05/14/18 145/82   05/14/18 145/82   04/13/18 146/84    Weight from Last 3 Encounters:   05/14/18 97.5 kg (215 lb)   05/14/18 97.7 kg (215 lb 4.8 oz)   04/13/18 95.3 kg (210 lb)              We Performed the Following     Corneal Topography OS (left eye)        Primary Care Provider Office Phone # Fax #    Matilde BENSON MERCEDES Quiñonez [phone]       [address] [place] [address]  [facility] [address]        Equal Access to Services     DANK [facility] CenterNIHARIKA : Hadii aad ku hadasho Soomaali, waaxda luqadaha, qaybta kaalmada adeegyada, waxay idiin hayaan adehari seals . So [facility] [phone].    ATENCIÓN: Si habla español, tiene a davis disposición servicios gratuitos de asistencia lingüística. Llame al [phone].    We comply with applicable federal civil rights laws and [place] laws. We do not discriminate on the basis of race, color, national origin, age, disability, sex, sexual orientation, or gender identity.            Thank you!     Thank you for choosing EYE CLINIC  for your care. Our goal is always to provide you with excellent care. Hearing back from our patients is one way we can continue to improve our services. Please take a few minutes to complete the written survey that you may receive in the mail after your visit with us. Thank you!             Your Updated Medication List - Protect others around you: Learn how to safely use, store and throw away your medicines at www.disposemymeds.org.          This list is accurate as of 5/16/18  3:14 PM.  Always use your most recent med list.                   Brand Name Dispense  Instructions for use Diagnosis    * amLODIPine 5 MG tablet    NORVASC    90 tablet    Take 1 tablet (5 mg) by mouth At Bedtime    Essential hypertension, benign       * amLODIPine 10 MG tablet    NORVASC    90 tablet    Take 1 tablet (10 mg) by mouth At Bedtime AS DIRECTED    Essential hypertension, benign       anastrozole 1 MG tablet    ARIMIDEX    90 tablet    Take 1 tablet (1 mg) by mouth daily    Malignant neoplasm of left breast in female, estrogen receptor positive, unspecified site of breast (H)       atorvastatin 40 MG tablet    LIPITOR    90 tablet    Take 1 tablet (40 mg) by mouth daily Please put on profile- pt just received 90-day supply of cholesterol meds    Pure hypercholesterolemia       * cholecalciferol 1000 UNIT tablet    vitamin D3    100 tablet    Take 1 tablet (1,000 Units) by mouth daily    Osteoporosis       * cholecalciferol 5000 units Caps capsule    vitamin D3    120 capsule    Take 1 capsule (5,000 Units) by mouth daily    Vaginal dryness       CoQ10 200 MG Caps      Take 1 capsule by mouth daily        * diclofenac 1 % Gel topical gel    VOLTAREN    100 g    Apply 4 grams to knees or 2 grams to hands four times daily using enclosed dosing card.    Primary osteoarthritis of both wrists       * VOLTAREN 1 % Gel topical gel   Generic drug:  diclofenac     100 g    Apply 4 grams to knees or 2 grams to hands four times daily using enclosed dosing card.    Right knee pain, unspecified chronicity       gabapentin 300 MG capsule    NEURONTIN    540 capsule    1-2 tablets EVERY 8 HOURS    Neuropathic pain       HYDROcodone-acetaminophen 5-325 MG per tablet    NORCO    40 tablet    Take 1 tablet by mouth every 6 hours as needed    Chronic left-sided low back pain, with sciatica presence unspecified       ketoconazole 2 % shampoo    NIZORAL    120 mL    Apply topically daily as needed for itching or irritation    Dermatitis, seborrheic       ketorolac 0.5 % ophthalmic solution    ACULAR     Apply  1 drop to eye        levothyroxine 112 MCG tablet    SYNTHROID/LEVOTHROID    45 tablet    Take 0.5 tab daily    Hypothyroidism, unspecified type       LORazepam 2 MG tablet    ATIVAN    30 tablet    Take 1 tablet at night for sleep    Sleep disorder       losartan 25 MG tablet    COZAAR    90 tablet    Take 1 tablet (25 mg) by mouth daily    Essential hypertension, benign       metoprolol succinate 25 MG 24 hr tablet    TOPROL-XL    90 tablet    Take 1 tablet (25 mg) by mouth daily    Irregular heart rate       Multi-vitamin Tabs tablet   Generic drug:  multivitamin, therapeutic with minerals      Take 1 tablet by mouth daily.        ofloxacin 0.3 % ophthalmic solution    OCUFLOX     Apply 1 drop to eye        OMEGA-3 FISH OIL PO      Take 1 capsule by mouth daily        omeprazole 20 MG CR capsule    priLOSEC     Take 20 mg by mouth daily        ondansetron 4 MG ODT tab    ZOFRAN-ODT    90 tablet    1-2 tabs po prn every 6 hours    Nausea       * prednisoLONE acetate 1 % ophthalmic susp    PRED FORTE    1 Bottle    Place 1 drop Into the left eye 3 times daily    Postoperative eye state       * prednisoLONE acetate 1 % ophthalmic susp    PRED FORTE    1 Bottle    Place 1 drop into the right eye 3 times daily    Postoperative eye state       REPHRESH Gel     14 Box    Place 2 g vaginally every 3 days    Urinary tract infection, site not specified       terbinafine 1 % cream    lamISIL AT    12 g    Apply topically 2 times daily For fungal infection not resolved with other antifungals (e.g. Clotrimazole)    Tinea pedis of both feet       triamcinolone 0.1 % ointment    KENALOG    30 g    Apply topically 2 times daily    Eczematous dermatitis       triamterene-hydrochlorothiazide 37.5-25 MG per tablet    MAXZIDE-25    90 tablet    Take 1 tablet by mouth daily    Essential hypertension, benign       * Notice:  This list has 8 medication(s) that are the same as other medications prescribed for you. Read the directions  carefully, and ask your doctor or other care provider to review them with you.

## 2018-05-16 NOTE — PROGRESS NOTES
CC: s/p CE/IOL OU    HPI: 68 yo CF s/p CE/IOL OS. Surgery complicated by opening of RK wounds temporally. Resutured. Doing well, denies any pain, feels like vision is improving. No flashes, diplopia. She has noticed more floaters OD    Interval:  Patient is happy with vision. She reports that vision is stronger in OD than OS. Reports floaters both eyes which are not worsening. Denies new flashes.     POHx:  H/o RK  S/p CE/IOL OS 2/20/18  S/p CE/IOL OD 3/13/18    Gtts:  Finished drops       Assessment / Plan:    1. S/p CE/IOL both eyes    K chato today OD  K suture removal OD  Ofloxacin four times a day  OD    RTC in 1 month with K chato    APOORVA Rodriguez  Cornea fellow

## 2018-05-16 NOTE — NURSING NOTE
Chief Complaints and History of Present Illnesses   Patient presents with     Follow Up For     Anterior basement membrane dystrophy     HPI    Affected eye(s):  Both   Symptoms:        Frequency:  Constant       Do you have eye pain now?:  No      Comments:  va is good  Cannot wait for the sutures to be removed  +dry   Mariela Hugo COT 1:42 PM May 16, 2018

## 2018-05-23 ENCOUNTER — TELEPHONE (OUTPATIENT)
Dept: PHARMACY | Facility: OTHER | Age: 68
End: 2018-05-23

## 2018-05-23 NOTE — TELEPHONE ENCOUNTER
Left message to reschedule MTM appt.    Daja Lowry [facility] Coordinator

## 2018-05-31 DIAGNOSIS — D17.30 LIPOMA OF SKIN AND SUBCUTANEOUS TISSUE: Primary | ICD-10-CM

## 2018-06-06 ENCOUNTER — OFFICE VISIT (OUTPATIENT)
Dept: ORTHOPEDICS | Facility: CLINIC | Age: 68
End: 2018-06-06
Payer: MEDICARE

## 2018-06-06 ENCOUNTER — RADIANT APPOINTMENT (OUTPATIENT)
Dept: GENERAL RADIOLOGY | Facility: CLINIC | Age: 68
End: 2018-06-06
Attending: FAMILY MEDICINE
Payer: MEDICARE

## 2018-06-06 VITALS
HEIGHT: 67 IN | BODY MASS INDEX: 33.74 KG/M2 | DIASTOLIC BLOOD PRESSURE: 98 MMHG | HEART RATE: 99 BPM | SYSTOLIC BLOOD PRESSURE: 157 MMHG | WEIGHT: 215 LBS

## 2018-06-06 DIAGNOSIS — M25.561 ACUTE PAIN OF RIGHT KNEE: Primary | ICD-10-CM

## 2018-06-06 DIAGNOSIS — M25.561 RIGHT KNEE PAIN: ICD-10-CM

## 2018-06-06 NOTE — MR AVS SNAPSHOT
After Visit Summary   6/6/2018    Nadira Perez    MRN: [id number]           Patient Information     Date Of Birth          1950        Visit Information        Provider Department      6/6/2018 4:20 PM Chaitanya Miguel MD [facility]        Today's Diagnoses     Acute pain of right knee    -  1       Follow-ups after your visit        Your next 10 appointments already scheduled     Jun 25, 2018  1:15 PM CDT   RETURN CORNEA with Tyson Ruby MD   Eye Clinic ([facility])    [address]  [address] Clin 9a  [facility] [address]   [phone]            Nov 12, 2018  9:30 AM CST   [facility] Lab Draw with  MASONIC LAB DRAW   [facility] Draw ([facility]    [address] [place] [address]  [address]  [facility] [address]   [phone]            Nov 12, 2018 10:00 AM CST   (Arrive by 9:45 AM)   Return Visit with Khushbu Louise MD   [facility] ([facility])    [address] [place] [address]  [address]  [facility] [address] [phone]            Nov 12, 2018 10:30 AM CST   Infusion 60 with  ONCOLOGY INFUSION, UC 31 ATC   [facility] ([facility])    [address] [place] [address]  [address]  [facility] [address] [phone]              Who to contact     Please call your clinic at [phone] to:    Ask questions about your health    Make or cancel appointments    Discuss your medicines    Learn about your test results    Speak to your doctor            Additional Information About Your Visit        MyChart Information     [employer] gives you secure access to your electronic health record. If you see a primary care provider, you can also send messages to your care team and make appointments. If you have questions, please call your primary care clinic.  If you do not have a primary care  "provider, please call [phone] and they will assist you.      [employer] is an electronic gateway that provides easy, online access to your medical records. With [employer], you can request a clinic appointment, read your test results, renew a prescription or communicate with your care team.     To access your existing account, please contact your [facility] or call [phone] for assistance.        Care EveryWhere ID     This is your Care EveryWhere ID. This could be used by other organizations to access your [place] medical records  [id number]        Your Vitals Were     Pulse Height BMI (Body Mass Index)             99 1.702 m (5' 7\") 33.67 kg/m2          Blood Pressure from Last 3 Encounters:   06/06/18 (!) 157/98   05/14/18 145/82   05/14/18 145/82    Weight from Last 3 Encounters:   06/06/18 97.5 kg (215 lb)   05/14/18 97.5 kg (215 lb)   05/14/18 97.7 kg (215 lb 4.8 oz)               Primary Care Provider Office Phone # Fax #    Matilde Quiñonez, APRN [phone]       [address] [place] [address]  [facility] [place] [facility] [address]        Equal Access to Services     EDEL LAMA AH: Hadii jacinta ku hadasho Soomaali, waaxda luqadaha, qaybta kaalmada adeegyada, [address] haywilfredon seven daugherty. So [facility] [phone].    ATENCIÓN: Si habla español, tiene a davis disposición servicios gratuitos de asistencia lingüística. Llame al [phone].    We comply with applicable federal civil rights laws and [place] laws. We do not discriminate on the basis of race, color, national origin, age, disability, sex, sexual orientation, or gender identity.            Thank you!     Thank you for choosing [facility]  for your care. Our goal is always to provide you with excellent care. Hearing back from our patients is one way we can continue to improve our services. Please take a few minutes to complete the written survey that you may receive in the mail " after your visit with us. Thank you!             Your Updated Medication List - Protect others around you: Learn how to safely use, store and throw away your medicines at www.disposemymeds.org.          This list is accurate as of 6/6/18 11:59 PM.  Always use your most recent med list.                   Brand Name Dispense Instructions for use Diagnosis    * amLODIPine 5 MG tablet    NORVASC    90 tablet    Take 1 tablet (5 mg) by mouth At Bedtime    Essential hypertension, benign       * amLODIPine 10 MG tablet    NORVASC    90 tablet    Take 1 tablet (10 mg) by mouth At Bedtime AS DIRECTED    Essential hypertension, benign       anastrozole 1 MG tablet    ARIMIDEX    90 tablet    Take 1 tablet (1 mg) by mouth daily    Malignant neoplasm of left breast in female, estrogen receptor positive, unspecified site of breast (H)       atorvastatin 40 MG tablet    LIPITOR    90 tablet    Take 1 tablet (40 mg) by mouth daily Please put on profile- pt just received 90-day supply of cholesterol meds    Pure hypercholesterolemia       * cholecalciferol 1000 UNIT tablet    vitamin D3    100 tablet    Take 1 tablet (1,000 Units) by mouth daily    Osteoporosis       * cholecalciferol 5000 units Caps capsule    vitamin D3    120 capsule    Take 1 capsule (5,000 Units) by mouth daily    Vaginal dryness       CoQ10 200 MG Caps      Take 1 capsule by mouth daily        * diclofenac 1 % Gel topical gel    VOLTAREN    100 g    Apply 4 grams to knees or 2 grams to hands four times daily using enclosed dosing card.    Primary osteoarthritis of both wrists       * VOLTAREN 1 % Gel topical gel   Generic drug:  diclofenac     100 g    Apply 4 grams to knees or 2 grams to hands four times daily using enclosed dosing card.    Right knee pain, unspecified chronicity       gabapentin 300 MG capsule    NEURONTIN    540 capsule    1-2 tablets EVERY 8 HOURS    Neuropathic pain       HYDROcodone-acetaminophen 5-325 MG per tablet    NORCO    40  tablet    Take 1 tablet by mouth every 6 hours as needed    Chronic left-sided low back pain, with sciatica presence unspecified       ketoconazole 2 % shampoo    NIZORAL    120 mL    Apply topically daily as needed for itching or irritation    Dermatitis, seborrheic       ketorolac 0.5 % ophthalmic solution    ACULAR     Apply 1 drop to eye        levothyroxine 112 MCG tablet    SYNTHROID/LEVOTHROID    45 tablet    Take 0.5 tab daily    Hypothyroidism, unspecified type       LORazepam 2 MG tablet    ATIVAN    30 tablet    Take 1 tablet at night for sleep    Sleep disorder       losartan 25 MG tablet    COZAAR    90 tablet    Take 1 tablet (25 mg) by mouth daily    Essential hypertension, benign       metoprolol succinate 25 MG 24 hr tablet    TOPROL-XL    90 tablet    Take 1 tablet (25 mg) by mouth daily    Irregular heart rate       Multi-vitamin Tabs tablet   Generic drug:  multivitamin, therapeutic with minerals      Take 1 tablet by mouth daily.        ofloxacin 0.3 % ophthalmic solution    OCUFLOX     Apply 1 drop to eye        OMEGA-3 FISH OIL PO      Take 1 capsule by mouth daily        omeprazole 20 MG CR capsule    priLOSEC     Take 20 mg by mouth daily        ondansetron 4 MG ODT tab    ZOFRAN-ODT    90 tablet    1-2 tabs po prn every 6 hours    Nausea       * prednisoLONE acetate 1 % ophthalmic susp    PRED FORTE    1 Bottle    Place 1 drop Into the left eye 3 times daily    Postoperative eye state       * prednisoLONE acetate 1 % ophthalmic susp    PRED FORTE    1 Bottle    Place 1 drop into the right eye 3 times daily    Postoperative eye state       REPHRESH Gel     14 Box    Place 2 g vaginally every 3 days    Urinary tract infection, site not specified       terbinafine 1 % cream    lamISIL AT    12 g    Apply topically 2 times daily For fungal infection not resolved with other antifungals (e.g. Clotrimazole)    Tinea pedis of both feet       triamcinolone 0.1 % ointment    KENALOG    30 g    Apply  topically 2 times daily    Eczematous dermatitis       triamterene-hydrochlorothiazide 37.5-25 MG per tablet    MAXZIDE-25    90 tablet    Take 1 tablet by mouth daily    Essential hypertension, benign       * Notice:  This list has 8 medication(s) that are the same as other medications prescribed for you. Read the directions carefully, and ask your doctor or other care provider to review them with you.

## 2018-06-06 NOTE — PROGRESS NOTES
SPORTS & ORTHOPEDIC WALK-IN FOLLOW-UP VISIT 6/6/2018    Interval History:     Follow up reason: R knee     Date of injury: 4/12/18    Date last seen: 5/14/18    Following Therapeutic Plan: Wearing brace, has not done PT     Pain: Improving- slight improvement    Function: Unchanged    Interval History: NA     Medical History:    Any recent changes to your medical history? No    Any new medication prescribed since last visit? No    Review of Systems:    Do you have fever, chills, weight loss? No    Do you have any vision problems? No    Do you have any chest pain or edema? No    Do you have any shortness of breath or wheezing?  No    Do you have stomach problems? No    Do you have any numbness or focal weakness? No    Do you have diabetes? No    Do you have problems with bleeding or clotting? No    Do you have an rashes or other skin lesions? 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     Thyroid disease        Active problem list:  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      Corneal opacity     Hypothyroidism     Osteopenia, unspecified location     Aromatase inhibitor use       FH:  Family History   Problem Relation Age of Onset     HEART DISEASE Father      AAA     Hypertension Father      Neurologic Disorder Mother      Anuerysm of Cerebral Artery, Dementia     DIABETES Mother      Thyroid Disease Mother      ,     CEREBROVASCULAR DISEASE Mother      Dementia Mother      OSTEOPOROSIS Mother      DIABETES Maternal Grandmother      Asthma Maternal Grandmother      DIABETES Maternal Aunt      x2     Melanoma Maternal Aunt      Circulatory Brother      Perihperal Neurophathy     Dementia Other      CANCER Other      malignant melanoma     Hypertension Other      Hypertension Other      CEREBROVASCULAR DISEASE Other      CEREBROVASCULAR DISEASE Other      Obesity Other      Respiratory Other      Chronic Obstructive Pulmonary Disease Maternal Grandfather      father     Asthma Maternal Grandfather      Breast Cancer Cousin      CANCER Other      DIABETES Other      Asthma Other      Glaucoma No family hx of      Macular Degeneration No family hx of      Coronary Artery Disease No family hx of      Hyperlipidemia No family hx of      KIDNEY DISEASE No family hx of      Thrombosis No family hx of      Arthritis No family hx of      Depression No family hx of      MENTAL ILLNESS No family hx of      Substance Abuse No family hx of      Cystic Fibrosis No family hx of      Early Death No family hx of      Coronary Artery Disease Early Onset No family hx of      Heart Failure No family hx of      Bleeding Diathesis No family hx of      Ovarian Cancer No family hx of      Uterine Cancer No family hx of      Prostate Cancer No family hx of      Colorectal Cancer No family hx of      Pancreatic Cancer No family hx of      Lung Cancer No family hx of      Other Cancer No family hx of      Autoimmune Disease No family hx of      Unknown/Adopted No family hx of      Genetic Disorder No family hx of         SH:  Social History     Social History     Marital status:      Spouse name: N/A     Number of children: N/A     Years of education: N/A     Occupational History     Not on file.     Social History Main Topics     Smoking status: Never Smoker     Smokeless tobacco: Never Used     Alcohol use Yes      Comment: Rare to occasional     Drug use: No     Sexual activity: Not Currently     Partners: Male     Birth control/ protection: Abstinence     Other Topics Concern      Service No     Caffeine Concern No     Occupational Exposure No     Hobby Hazards No     Sleep Concern No     Stress Concern No     Weight Concern No     Special Diet No     Back Care No     Exercise Yes     walks 4-6x  week for 20-30 min. each     Seat Belt Yes     Self-Exams Yes     Parent/Sibling W/ Cabg, Mi Or Angioplasty Before 65f 55m? No     Social History Narrative    .  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       MEDS:  See EMR, reviewed  ALL:  See EMR, reviewed    REVIEW OF SYSTEMS:  CONSTITUTIONAL:NEGATIVE for fever, chills, change in weight  INTEGUMENTARY/SKIN: NEGATIVE for worrisome rashes, moles or lesions  EYES: NEGATIVE for vision changes or irritation  ENT/MOUTH: NEGATIVE for ear, mouth and throat problems  RESP:NEGATIVE for significant cough or SOB  BREAST: NEGATIVE for masses, tenderness or discharge  CV: NEGATIVE for chest pain, palpitations or peripheral edema  GI: NEGATIVE for nausea, abdominal pain, heartburn, or change in bowel habits  :NEGATIVE for frequency, dysuria, or hematuria  :NEGATIVE for frequency, dysuria, or hematuria  NEURO: NEGATIVE for weakness, dizziness or paresthesias  ENDOCRINE: NEGATIVE for temperature intolerance, skin/hair changes  HEME/ALLERGY/IMMUNE: NEGATIVE for bleeding problems  PSYCHIATRIC: NEGATIVE for changes in mood or affect              SUBJECTIVE:  This 67-year-old female 6 weeks ago was evaluated in walk-in clinic  "after an acute injury which twisted her right knee.  She fell onto her left side, and as she fell, her right knee twisted.  She indicates that her right knee was swollen at the time.  She has had improvement since then.  Especially in the last 24 hours, she notices that she has had a \"good day\" with the pain being improved.  It still gives her some discomfort with weightbearing but not as much as it was 4 weeks ago and the swelling has also improved.  She has no locking of the knee.  Previous x-rays showed a mild amount of medial joint space narrowing consistent with some DJD.      OBJECTIVE:  She can do an active straight leg raise today with no extension lag and is nontender over the quadriceps tendon.  She is able to flex the knee from full extension to 110 degrees of flexion.  I would say that she has a trace effusion.  There is no swelling in the calf, no palpable cords and no tenderness in the calf with signs of DVT.  I do not see any asymmetry of swelling between the 2 lower extremities.  She has some mild dependent edema that is symmetrical to both her lower extremities.  There are no signs of cellulitis about the knee.  She does have some tenderness along the medial joint line.  She seems nontender over the proximal tibia or the proximal fibula.  Nontender over the lateral joint line.  The Lachman has a firm endpoint.  Anterior and posterior drawer is negative.  She has normal range of motion at the hip.  She is able to ambulate about the room and feels better with a hinged knee brace.        IMAGING:  Repeat x-rays of the knees standing including oblique views do not show signs of step off or asymmetry or evidence of previous tibial plateau fracture.  She has mild medial joint space narrowing in the tibiofemoral space.      ASSESSMENT:  Right-sided knee injury with possibility of degenerative meniscal tear, improving.      PLAN:  I do not see an indication for an MRI based on her current clinical findings. "  She seems to be slowly improving.  She has a prescription for physical therapy and plans on instituting it in the next 3 weeks which is reasonable.  She did have some initial swelling at the time of the injury, but it seems to have improved.  I would ask her to continue to monitor these symptoms over the next 4 weeks.  If she has persistent locking and catching of the knee, it may be reasonable to look for a significant meniscal tear, but at this point, her symptoms seem to be improving.

## 2018-06-06 NOTE — LETTER
6/6/2018       RE: Nadira Perez  [address]  [facility] [address]     Dear Colleague,    Thank you for referring your patient, Nadira Perez, to the [facility] SPORTS AND ORTHOPAED[facility] WALK IN CLINIC at [facility]. Please see a copy of my visit note below.          SPORTS & ORTHOPEDIC WALK-IN FOLLOW-UP VISIT 6/6/2018    Interval History:     Follow up reason: R knee     Date of injury: 4/12/18    Date last seen: 5/14/18    Following Therapeutic Plan: Wearing brace, has not done PT     Pain: Improving- slight improvement    Function: Unchanged    Interval History: NA     Medical History:    Any recent changes to your medical history? No    Any new medication prescribed since last visit? No    Review of Systems:    Do you have fever, chills, weight loss? No    Do you have any vision problems? No    Do you have any chest pain or edema? No    Do you have any shortness of breath or wheezing?  No    Do you have stomach problems? No    Do you have any numbness or focal weakness? No    Do you have diabetes? No    Do you have problems with bleeding or clotting? No    Do you have an rashes or other skin lesions? 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     Thyroid disease        Active problem list:  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     Hypopotassemia     Hyperlipidemia     Murmurs     Nephrolithiasis     Osteoarthrosis, hand     Personal history of  other malignant neoplasm of skin     Inflamed seborrheic keratosis     Essential hypertension, benign     Osteoporosis     Mechanical problems with limbs     Hypovitaminosis D     Contact dermatitis and other eczema, due to unspecified cause     Dermatitis     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     Corneal opacity     Hypothyroidism     Osteopenia, unspecified location     Aromatase inhibitor use       FH:  Family History   Problem Relation Age of Onset     HEART DISEASE Father      AAA     Hypertension Father      Neurologic Disorder Mother      Anuerysm of Cerebral Artery, Dementia     DIABETES Mother      Thyroid Disease Mother      ,     CEREBROVASCULAR DISEASE Mother      Dementia Mother      OSTEOPOROSIS Mother      DIABETES Maternal Grandmother      Asthma Maternal Grandmother      DIABETES Maternal Aunt      x2     Melanoma Maternal Aunt      Circulatory Brother      Perihperal Neurophathy     Dementia Other      CANCER Other      malignant melanoma     Hypertension Other      Hypertension Other      CEREBROVASCULAR DISEASE Other      CEREBROVASCULAR DISEASE Other      Obesity Other      Respiratory Other      Chronic Obstructive Pulmonary Disease Maternal Grandfather      father     Asthma Maternal Grandfather      Breast Cancer Cousin      CANCER Other      DIABETES Other      Asthma Other      Glaucoma No family hx of      Macular Degeneration No family hx of      Coronary Artery Disease No family hx of      Hyperlipidemia No family hx of      KIDNEY DISEASE No family hx of      Thrombosis No family hx of      Arthritis No family hx of      Depression No family hx of      MENTAL ILLNESS No family hx of      Substance Abuse No family hx of      Cystic Fibrosis No family hx of      Early Death No family hx of      Coronary Artery Disease Early Onset No family hx of      Heart Failure No family hx of      Bleeding Diathesis No family hx of      Ovarian Cancer No family hx of      Uterine Cancer  No family hx of      Prostate Cancer No family hx of      Colorectal Cancer No family hx of      Pancreatic Cancer No family hx of      Lung Cancer No family hx of      Other Cancer No family hx of      Autoimmune Disease No family hx of      Unknown/Adopted No family hx of      Genetic Disorder No family hx of        SH:  Social History     Social History     Marital status:      Spouse name: N/A     Number of children: N/A     Years of education: N/A     Occupational History     Not on file.     Social History Main Topics     Smoking status: Never Smoker     Smokeless tobacco: Never Used     Alcohol use Yes      Comment: Rare to occasional     Drug use: No     Sexual activity: Not Currently     Partners: Male     Birth control/ protection: Abstinence     Other Topics Concern      Service No     Caffeine Concern No     Occupational Exposure No     Hobby Hazards No     Sleep Concern No     Stress Concern No     Weight Concern No     Special Diet No     Back Care No     Exercise Yes     walks 4-6x  week for 20-30 min. each     Seat Belt Yes     Self-Exams Yes     Parent/Sibling W/ Cabg, Mi Or Angioplasty Before 65f 55m? No     Social History Narrative    .  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       MEDS:  See EMR, reviewed  ALL:  See EMR, reviewed    REVIEW OF SYSTEMS:  CONSTITUTIONAL:NEGATIVE for fever, chills, change in weight  INTEGUMENTARY/SKIN: NEGATIVE for worrisome rashes, moles or lesions  EYES: NEGATIVE for vision changes or irritation  ENT/MOUTH: NEGATIVE for ear, mouth and throat problems  RESP:NEGATIVE for significant cough or SOB  BREAST: NEGATIVE for masses, tenderness or discharge  CV: NEGATIVE for chest pain, palpitations or peripheral edema  GI: NEGATIVE for nausea, abdominal pain, heartburn, or change in bowel habits  :NEGATIVE for frequency, dysuria, or hematuria  :NEGATIVE for frequency, dysuria, or hematuria  NEURO:  "NEGATIVE for weakness, dizziness or paresthesias  ENDOCRINE: NEGATIVE for temperature intolerance, skin/hair changes  HEME/ALLERGY/IMMUNE: NEGATIVE for bleeding problems  PSYCHIATRIC: NEGATIVE for changes in mood or affect              SUBJECTIVE:  This 67-year-old female 6 weeks ago was evaluated in walk-in clinic after an acute injury which twisted her right knee.  She fell onto her left side, and as she fell, her right knee twisted.  She indicates that her right knee was swollen at the time.  She has had improvement since then.  Especially in the last 24 hours, she notices that she has had a \"good day\" with the pain being improved.  It still gives her some discomfort with weightbearing but not as much as it was 4 weeks ago and the swelling has also improved.  She has no locking of the knee.  Previous x-rays showed a mild amount of medial joint space narrowing consistent with some DJD.      OBJECTIVE:  She can do an active straight leg raise today with no extension lag and is nontender over the quadriceps tendon.  She is able to flex the knee from full extension to 110 degrees of flexion.  I would say that she has a trace effusion.  There is no swelling in the calf, no palpable cords and no tenderness in the calf with signs of DVT.  I do not see any asymmetry of swelling between the 2 lower extremities.  She has some mild dependent edema that is symmetrical to both her lower extremities.  There are no signs of cellulitis about the knee.  She does have some tenderness along the medial joint line.  She seems nontender over the proximal tibia or the proximal fibula.  Nontender over the lateral joint line.  The Lachman has a firm endpoint.  Anterior and posterior drawer is negative.  She has normal range of motion at the hip.  She is able to ambulate about the room and feels better with a hinged knee brace.        IMAGING:  Repeat x-rays of the knees standing including oblique views do not show signs of step off or " asymmetry or evidence of previous tibial plateau fracture.  She has mild medial joint space narrowing in the tibiofemoral space.      ASSESSMENT:  Right-sided knee injury with possibility of degenerative meniscal tear, improving.      PLAN:  I do not see an indication for an MRI based on her current clinical findings.  She seems to be slowly improving.  She has a prescription for physical therapy and plans on instituting it in the next 3 weeks which is reasonable.  She did have some initial swelling at the time of the injury, but it seems to have improved.  I would ask her to continue to monitor these symptoms over the next 4 weeks.  If she has persistent locking and catching of the knee, it may be reasonable to look for a significant meniscal tear, but at this point, her symptoms seem to be improving.     Again, thank you for allowing me to participate in the care of your patient.      Sincerely,    Chaitanya Miguel MD

## 2018-06-25 ENCOUNTER — OFFICE VISIT (OUTPATIENT)
Dept: OPHTHALMOLOGY | Facility: CLINIC | Age: 68
End: 2018-06-25
Attending: OPHTHALMOLOGY
Payer: MEDICARE

## 2018-06-25 DIAGNOSIS — Z98.890 HISTORY OF RADIAL KERATOTOMY: Primary | ICD-10-CM

## 2018-06-25 DIAGNOSIS — Z96.1 PSEUDOPHAKIA OF BOTH EYES: ICD-10-CM

## 2018-06-25 PROCEDURE — 92025 CPTRIZED CORNEAL TOPOGRAPHY: CPT | Mod: ZF | Performed by: OPHTHALMOLOGY

## 2018-06-25 PROCEDURE — G0463 HOSPITAL OUTPT CLINIC VISIT: HCPCS | Mod: ZF

## 2018-06-25 ASSESSMENT — EXTERNAL EXAM - LEFT EYE: OS_EXAM: NORMAL

## 2018-06-25 ASSESSMENT — SLIT LAMP EXAM - LIDS
COMMENTS: NORMAL
COMMENTS: NORMAL

## 2018-06-25 ASSESSMENT — VISUAL ACUITY
METHOD: SNELLEN - LINEAR
OS_SC+: -2
OD_SC+: +2
OD_PH_SC: 20/20-2
OD_SC: 20/40
OS_SC: 20/40
OS_PH_SC: 20/25+1

## 2018-06-25 ASSESSMENT — CONF VISUAL FIELD
OD_NORMAL: 1
METHOD: COUNTING FINGERS
OS_NORMAL: 1

## 2018-06-25 ASSESSMENT — TONOMETRY
OS_IOP_MMHG: 18
IOP_METHOD: TONOPEN
OD_IOP_MMHG: 19

## 2018-06-25 ASSESSMENT — EXTERNAL EXAM - RIGHT EYE: OD_EXAM: NORMAL

## 2018-06-25 NOTE — PROGRESS NOTES
CC: s/p CE/IOL OU    HPI: 68 yo CF s/p CE/IOL OS. Surgery complicated by opening of RK wounds temporally. Resutured. Doing well, denies any pain, feels like vision is improving. No flashes, diplopia. She has noticed more floaters OD    Interval:  Patient is happy with vision. Stable, denies pain, redness or tearing.      POHx:  H/o RK  S/p CE/IOL OS 2/20/18  S/p CE/IOL OD 3/13/18    Gtts:  PF AT PRN    Assessment / Plan:    1. S/p CE/IOL both eyes  K chato today OD  Fluorsecein staining within temporal RK scar, hold on removing suture for now    RTC in 2 months    Carlos Ribera MD  PGY3, Dept of Ophthalmology  Pager [phone]    ~~~~~~~~~~~~~~~~~~~~~~~~~~~~~~~~~~~~~~~~~~~~~~~~~~~~~~~~~~~~~~~~    Complete documentation of historical and exam elements from today's encounter can be found in the full encounter summary report (not reduplicated in this progress note). I personally obtained the chief complaint(s) and history of present illness.  I confirmed and edited as necessary the review of systems, past medical/surgical history, family history, social history, and examination findings as documented by others; and I examined the patient myself. I personally reviewed the relevant tests, images, and reports as documented above. I formulated and edited as necessary the assessment and plan and discussed the findings and management plan with the patient and family.    I personally viewed the [imaging] and I agree with the interpretation as documented by the resident/fellow and edited by me as appropriate.    Tyson Ruby MD

## 2018-06-25 NOTE — MR AVS SNAPSHOT
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      Your next 10 appointments already scheduled     Aug 20, 2018  1:00 PM CDT   RETURN CORNEA with Tyson Ruby MD   Eye Clinic ([facility])    [address]  9[address] Clin 9a  [facility] [address]   [phone]            Nov 12, 2018  9:30 AM CST   [facility] Lab Draw with  MASONIC LAB DRAW   [facility] Lab Draw ([facility])    [address] [facility]  [address]  [facility] [address] [phone]            Nov 12, 2018 10:00 AM CST   (Arrive by 9:45 AM)   Return Visit with Khushbu Louise MD   [facility] ([facility])    [address] [facility] [address]  [address]  [facility] [address] [phone]            Nov 12, 2018 10:30 AM CST   Infusion 60 with UC ONCOLOGY INFUSION, UC 31 ATC   [facility] ([facility])    [address] [facility] [address] [address] [facility]  [address]  [facility] [address] [phone]              Who to contact     Please call your clinic at [phone] to:    Ask questions about your health    Make or cancel appointments    Discuss your medicines    Learn about your test results    Speak to your doctor            Additional Information About Your Visit        MyChart Information     [employer] gives you secure access to your electronic health record. If you see a primary care provider, you can also send  messages to your care team and make appointments. If you have questions, please call your primary care clinic.  If you do not have a primary care provider, please call [phone] and they will assist you.      [employer] is an electronic gateway that provides easy, online access to your medical records. With [employer], you can request a clinic appointment, read your test results, renew a prescription or communicate with your care team.     To access your existing account, please contact your [facility] or call [phone] for assistance.        Care EveryWhere ID     This is your Care EveryWhere ID. This could be used by other organizations to access your [place] medical records  [id number]         Blood Pressure from Last 3 Encounters:   06/06/18 (!) 157/98   05/14/18 145/82   05/14/18 145/82    Weight from Last 3 Encounters:   06/06/18 97.5 kg (215 lb)   05/14/18 97.5 kg (215 lb)   05/14/18 97.7 kg (215 lb 4.8 oz)              We Performed the Following     Corneal Topography OU (both eyes)        Primary Care Provider Office Phone # Fax #    Matilde Quiñonez, APRN [phone]       [address] [place] [address] [address]  [place] [address]        Equal Access to Services     EDEL LAMA : Hadii aad ku hadasho Soomaali, waaxda luqadaha, qaybta kaalmada adeegyada, waxay idiin haywilfredon seven daugherty. So [facility] [phone].    ATENCIÓN: Si habla español, tiene a davis disposición servicios gratuitos de asistencia lingüística. Llame al [phone].    We comply with applicable federal civil rights laws and [place] laws. We do not discriminate on the basis of race, color, national origin, age, disability, sex, sexual orientation, or gender identity.            Thank you!     Thank you for choosing EYE CLINIC  for your care. Our goal is always to provide you with excellent care. Hearing back from our patients is one way we can continue to improve our services. Please take a  few minutes to complete the written survey that you may receive in the mail after your visit with us. Thank you!             Your Updated Medication List - Protect others around you: Learn how to safely use, store and throw away your medicines at www.disposemymeds.org.          This list is accurate as of 6/25/18  2:53 PM.  Always use your most recent med list.                   Brand Name Dispense Instructions for use Diagnosis    * amLODIPine 5 MG tablet    NORVASC    90 tablet    Take 1 tablet (5 mg) by mouth At Bedtime    Essential hypertension, benign       * amLODIPine 10 MG tablet    NORVASC    90 tablet    Take 1 tablet (10 mg) by mouth At Bedtime AS DIRECTED    Essential hypertension, benign       anastrozole 1 MG tablet    ARIMIDEX    90 tablet    Take 1 tablet (1 mg) by mouth daily    Malignant neoplasm of left breast in female, estrogen receptor positive, unspecified site of breast (H)       atorvastatin 40 MG tablet    LIPITOR    90 tablet    Take 1 tablet (40 mg) by mouth daily Please put on profile- pt just received 90-day supply of cholesterol meds    Pure hypercholesterolemia       * cholecalciferol 1000 UNIT tablet    vitamin D3    100 tablet    Take 1 tablet (1,000 Units) by mouth daily    Osteoporosis       * cholecalciferol 5000 units Caps capsule    vitamin D3    120 capsule    Take 1 capsule (5,000 Units) by mouth daily    Vaginal dryness       CoQ10 200 MG Caps      Take 1 capsule by mouth daily        * diclofenac 1 % Gel topical gel    VOLTAREN    100 g    Apply 4 grams to knees or 2 grams to hands four times daily using enclosed dosing card.    Primary osteoarthritis of both wrists       * VOLTAREN 1 % Gel topical gel   Generic drug:  diclofenac     100 g    Apply 4 grams to knees or 2 grams to hands four times daily using enclosed dosing card.    Right knee pain, unspecified chronicity       gabapentin 300 MG capsule    NEURONTIN    540 capsule    1-2 tablets EVERY 8 HOURS    Neuropathic  pain       HYDROcodone-acetaminophen 5-325 MG per tablet    NORCO    40 tablet    Take 1 tablet by mouth every 6 hours as needed    Chronic left-sided low back pain, with sciatica presence unspecified       ketoconazole 2 % shampoo    NIZORAL    120 mL    Apply topically daily as needed for itching or irritation    Dermatitis, seborrheic       ketorolac 0.5 % ophthalmic solution    ACULAR     Apply 1 drop to eye        levothyroxine 112 MCG tablet    SYNTHROID/LEVOTHROID    45 tablet    Take 0.5 tab daily    Hypothyroidism, unspecified type       LORazepam 2 MG tablet    ATIVAN    30 tablet    Take 1 tablet at night for sleep    Sleep disorder       losartan 25 MG tablet    COZAAR    90 tablet    Take 1 tablet (25 mg) by mouth daily    Essential hypertension, benign       metoprolol succinate 25 MG 24 hr tablet    TOPROL-XL    90 tablet    Take 1 tablet (25 mg) by mouth daily    Irregular heart rate       Multi-vitamin Tabs tablet   Generic drug:  multivitamin, therapeutic with minerals      Take 1 tablet by mouth daily.        ofloxacin 0.3 % ophthalmic solution    OCUFLOX     Apply 1 drop to eye        OMEGA-3 FISH OIL PO      Take 1 capsule by mouth daily        omeprazole 20 MG CR capsule    priLOSEC     Take 20 mg by mouth daily        ondansetron 4 MG ODT tab    ZOFRAN-ODT    90 tablet    1-2 tabs po prn every 6 hours    Nausea       * prednisoLONE acetate 1 % ophthalmic susp    PRED FORTE    1 Bottle    Place 1 drop Into the left eye 3 times daily    Postoperative eye state       * prednisoLONE acetate 1 % ophthalmic susp    PRED FORTE    1 Bottle    Place 1 drop into the right eye 3 times daily    Postoperative eye state       REPHRESH Gel     14 Box    Place 2 g vaginally every 3 days    Urinary tract infection, site not specified       terbinafine 1 % cream    lamISIL AT    12 g    Apply topically 2 times daily For fungal infection not resolved with other antifungals (e.g. Clotrimazole)    Tinea pedis of  both feet       triamcinolone 0.1 % ointment    KENALOG    30 g    Apply topically 2 times daily    Eczematous dermatitis       triamterene-hydrochlorothiazide 37.5-25 MG per tablet    MAXZIDE-25    90 tablet    Take 1 tablet by mouth daily    Essential hypertension, benign       * Notice:  This list has 8 medication(s) that are the same as other medications prescribed for you. Read the directions carefully, and ask your doctor or other care provider to review them with you.

## 2018-06-28 ENCOUNTER — OFFICE VISIT (OUTPATIENT)
Dept: ORTHOPEDICS | Facility: CLINIC | Age: 68
End: 2018-06-28
Payer: MEDICARE

## 2018-06-28 VITALS
HEART RATE: 93 BPM | DIASTOLIC BLOOD PRESSURE: 89 MMHG | BODY MASS INDEX: 33.74 KG/M2 | HEIGHT: 67 IN | SYSTOLIC BLOOD PRESSURE: 153 MMHG | WEIGHT: 215 LBS

## 2018-06-28 DIAGNOSIS — M23.91 ACUTE INTERNAL DERANGEMENT OF KNEE, RIGHT: ICD-10-CM

## 2018-06-28 DIAGNOSIS — M25.561 ACUTE PAIN OF RIGHT KNEE: Primary | ICD-10-CM

## 2018-06-28 NOTE — PROGRESS NOTES
SPORTS & ORTHOPEDIC WALK-IN FOLLOW-UP VISIT 6/28/2018    Interval History:     Follow up reason: Continuing to have R knee pain    Date of injury: 4/12/18    Date last seen: 6/6/18-  in [facility]    Following Therapeutic Plan:  Wearing brace, has not started PT yet as concerned she may have a meniscal tear    Pain: Seems to get better, then will have a bad spurt.    Function: Unchanged     Interval History:  Seems to best in the AM and worse by the end of the day     Medical History:    Any recent changes to your medical history? No    Any new medication prescribed since last visit? No    Review of Systems:    Do you have fever, chills, weight loss? No    Do you have any vision problems? No    Do you have any chest pain or edema? No    Do you have any shortness of breath or wheezing?  No    Do you have stomach problems? No    Do you have any numbness or focal weakness? No    Do you have diabetes? No    Do you have problems with bleeding or clotting? No    Do you have an rashes or other skin lesions? No

## 2018-06-28 NOTE — MR AVS SNAPSHOT
After Visit Summary   6/28/2018    Nadira Perez    MRN: [id number]           Patient Information     Date Of Birth          1950        Visit Information        Provider Department      6/28/2018 11:50 AM Willis Borjas, DO [facility] Sports and Orthopaedic Walk In [facility]        Today's Diagnoses     Acute pain of right knee    -  1    Acute internal derangement of knee, right           Follow-ups after your visit        Your next 10 appointments already scheduled     Jul 06, 2018  3:15 PM CDT   MR KNEE RIGHT W/O CONTRAST with [id number]   [facility] Imaging [place] [facility] ([facility])    [address] [address] [phone]           Take your medicines as usual, unless your doctor tells you not to. Bring a list of your current medicines to your exam (including vitamins, minerals and over-the-counter drugs). Also bring the results of similar scans you may have had.  Please remove any body piercings and hair extensions before you arrive.  Follow your doctor s orders. If you do not, we may have to postpone your exam.  You may or may not receive IV contrast for this exam pending the discretion of the Radiologist.  You do not need to do anything special to prepare.  The MRI machine uses a strong magnet. Please wear clothes without metal (snaps, zippers). A sweatsuit works well, or we may give you a hospital gown.   **IMPORTANT** THE INSTRUCTIONS BELOW ARE ONLY FOR THOSE PATIENTS WHO HAVE BEEN PRESCRIBED SEDATION OR GENERAL ANESTHESIA DURING THEIR MRI PROCEDURE:  IF YOUR DOCTOR PRESCRIBED ORAL SEDATION (take medicine to help you relax during your exam):   You must get the medicine from your doctor (oral medication) before you arrive. Bring the medicine to the exam. Do not take it at home. You ll be told when to take it upon arriving for your exam.   Arrive one hour early. Bring someone who can take you home after the test. Your medicine will  make you sleepy. After the exam, you may not drive, take a bus or take a taxi by yourself.  IF YOUR DOCTOR PRESCRIBED IV SEDATION:   Arrive one hour early. Bring someone who can take you home after the test. Your medicine will make you sleepy. After the exam, you may not drive, take a bus or take a taxi by yourself.   No eating 6 hours before your exam. You may have clear liquids up until 4 hours before your exam. (Clear liquids include water, clear tea, black coffee and fruit juice without pulp.)  IF YOUR DOCTOR PRESCRIBED ANESTHESIA (be asleep for your exam):   Arrive 1 1/2 hours early. Bring someone who can take you home after the test. You may not drive, take a bus or take a taxi by yourself.   No eating 8 hours before your exam. You may have clear liquids up until 4 hours before your exam. (Clear liquids include water, clear tea, black coffee and fruit juice without pulp.)   You will spend four to five hours in the recovery room.  Please call the Imaging Department at your exam site with any questions.            Aug 20, 2018  1:00 PM CDT   RETURN CORNEA with Tyson Ruby MD   Eye Clinic ([facility])    [address]  9th Fl Clin 9a  [facility] [address]   [phone]            Nov 12, 2018  9:30 AM CST   [facility] Lab Draw with  MASONIC LAB DRAW   [facility] Lab Draw ([facility])    [address]  [address]  [facility] [address] [phone]            Nov 12, 2018 10:00 AM CST   (Arrive by 9:45 AM)   Return Visit with Khushbu Louise MD   [facility] ([facility])    [address]  [address]  [facility] [address] [phone]            Nov 12, 2018 10:30 AM CST   Infusion 60 with  ONCOLOGY INFUSION, UC 31 ATC   [facility] ([facility])    [address] [place] [address]  [address]  [facility] [address]  "  [phone]              Future tests that were ordered for you today     Open Future Orders        Priority Expected Expires Ordered    MR Knee Right w/o Contrast Routine  6/28/2019 6/28/2018            Who to contact     Please call your clinic at [phone] to:    Ask questions about your health    Make or cancel appointments    Discuss your medicines    Learn about your test results    Speak to your doctor            Additional Information About Your Visit        [employer] Information     [employer] gives you secure access to your electronic health record. If you see a primary care provider, you can also send messages to your care team and make appointments. If you have questions, please call your primary care clinic.  If you do not have a primary care provider, please call [phone] and they will assist you.      [employer] is an electronic gateway that provides easy, online access to your medical records. With [employer], you can request a clinic appointment, read your test results, renew a prescription or communicate with your care team.     To access your existing account, please contact your [facility] or call [phone] for assistance.        Care EveryWhere ID     This is your Care EveryWhere ID. This could be used by other organizations to access your [place] medical records  [id number]        Your Vitals Were     Pulse Height BMI (Body Mass Index)             93 1.702 m (5' 7\") 33.67 kg/m2          Blood Pressure from Last 3 Encounters:   06/28/18 153/89   06/06/18 (!) 157/98   05/14/18 145/82    Weight from Last 3 Encounters:   06/28/18 97.5 kg (215 lb)   06/06/18 97.5 kg (215 lb)   05/14/18 97.5 kg (215 lb)               Primary Care Provider Office Phone # Fax #    MERCEDES Rivera [phone]       [address] [place] [address] [address] [place] [address]        Equal Access to Services     EDEL JAVIER: lynda Davila, " anthony arnoldodarlyn simbacierra pate onealin hayaan adeeg kharash la'aan ah. Rosa Is[facility] [phone].    ATENCIÓN: Si adrián helton, tiene a davis disposición servicios gratuitos de asistencia lingüística. Garrick al [phone].    We comply with applicable federal civil rights laws and [place] laws. We do not discriminate on the basis of race, color, national origin, age, disability, sex, sexual orientation, or gender identity.            Thank you!     Thank you for choosing [facility] SPORTS AND ORTHOPAEDIC WALK IN CLINIC  for your care. Our goal is always to provide you with excellent care. Hearing back from our patients is one way we can continue to improve our services. Please take a few minutes to complete the written survey that you may receive in the mail after your visit with us. Thank you!             Your Updated Medication List - Protect others around you: Learn how to safely use, store and throw away your medicines at www.disposemymeds.org.          This list is accurate as of 6/28/18 12:19 PM.  Always use your most recent med list.                   Brand Name Dispense Instructions for use Diagnosis    * amLODIPine 5 MG tablet    NORVASC    90 tablet    Take 1 tablet (5 mg) by mouth At Bedtime    Essential hypertension, benign       * amLODIPine 10 MG tablet    NORVASC    90 tablet    Take 1 tablet (10 mg) by mouth At Bedtime AS DIRECTED    Essential hypertension, benign       anastrozole 1 MG tablet    ARIMIDEX    90 tablet    Take 1 tablet (1 mg) by mouth daily    Malignant neoplasm of left breast in female, estrogen receptor positive, unspecified site of breast (H)       atorvastatin 40 MG tablet    LIPITOR    90 tablet    Take 1 tablet (40 mg) by mouth daily Please put on profile- pt just received 90-day supply of cholesterol meds    Pure hypercholesterolemia       * cholecalciferol 1000 UNIT tablet    vitamin D3    100 tablet    Take 1 tablet (1,000 Units) by mouth daily    Osteoporosis       *  cholecalciferol 5000 units Caps capsule    vitamin D3    120 capsule    Take 1 capsule (5,000 Units) by mouth daily    Vaginal dryness       CoQ10 200 MG Caps      Take 1 capsule by mouth daily        * diclofenac 1 % Gel topical gel    VOLTAREN    100 g    Apply 4 grams to knees or 2 grams to hands four times daily using enclosed dosing card.    Primary osteoarthritis of both wrists       * VOLTAREN 1 % Gel topical gel   Generic drug:  diclofenac     100 g    Apply 4 grams to knees or 2 grams to hands four times daily using enclosed dosing card.    Right knee pain, unspecified chronicity       gabapentin 300 MG capsule    NEURONTIN    540 capsule    1-2 tablets EVERY 8 HOURS    Neuropathic pain       HYDROcodone-acetaminophen 5-325 MG per tablet    NORCO    40 tablet    Take 1 tablet by mouth every 6 hours as needed    Chronic left-sided low back pain, with sciatica presence unspecified       ketoconazole 2 % shampoo    NIZORAL    120 mL    Apply topically daily as needed for itching or irritation    Dermatitis, seborrheic       ketorolac 0.5 % ophthalmic solution    ACULAR     Apply 1 drop to eye        levothyroxine 112 MCG tablet    SYNTHROID/LEVOTHROID    45 tablet    Take 0.5 tab daily    Hypothyroidism, unspecified type       LORazepam 2 MG tablet    ATIVAN    30 tablet    Take 1 tablet at night for sleep    Sleep disorder       losartan 25 MG tablet    COZAAR    90 tablet    Take 1 tablet (25 mg) by mouth daily    Essential hypertension, benign       metoprolol succinate 25 MG 24 hr tablet    TOPROL-XL    90 tablet    Take 1 tablet (25 mg) by mouth daily    Irregular heart rate       Multi-vitamin Tabs tablet   Generic drug:  multivitamin, therapeutic with minerals      Take 1 tablet by mouth daily.        ofloxacin 0.3 % ophthalmic solution    OCUFLOX     Apply 1 drop to eye        OMEGA-3 FISH OIL PO      Take 1 capsule by mouth daily        omeprazole 20 MG CR capsule    priLOSEC     Take 20 mg by mouth  daily        ondansetron 4 MG ODT tab    ZOFRAN-ODT    90 tablet    1-2 tabs po prn every 6 hours    Nausea       * prednisoLONE acetate 1 % ophthalmic susp    PRED FORTE    1 Bottle    Place 1 drop Into the left eye 3 times daily    Postoperative eye state       * prednisoLONE acetate 1 % ophthalmic susp    PRED FORTE    1 Bottle    Place 1 drop into the right eye 3 times daily    Postoperative eye state       REPHRESH Gel     14 Box    Place 2 g vaginally every 3 days    Urinary tract infection, site not specified       terbinafine 1 % cream    lamISIL AT    12 g    Apply topically 2 times daily For fungal infection not resolved with other antifungals (e.g. Clotrimazole)    Tinea pedis of both feet       triamcinolone 0.1 % ointment    KENALOG    30 g    Apply topically 2 times daily    Eczematous dermatitis       triamterene-hydrochlorothiazide 37.5-25 MG per tablet    MAXZIDE-25    90 tablet    Take 1 tablet by mouth daily    Essential hypertension, benign       * Notice:  This list has 8 medication(s) that are the same as other medications prescribed for you. Read the directions carefully, and ask your doctor or other care provider to review them with you.

## 2018-06-29 NOTE — PROGRESS NOTES
"ESTABLISHED PATIENT FOLLOW-UP:  RECHECK of the Right Knee       HISTORY OF PRESENT ILLNESS  Ms. Perez is a pleasant 68 year old year old female who presents to clinic today for follow-up of right knee pain.  She has been seen over the past 4 weeks by two providers at the [facility] and did have improvement but last 2 weeks has worsened.  Does not want to start PT until she has MRI.      Date of injury: 4/12/18  Date last seen: 6/6/18 improving at that time, PT ordered  Following Therapeutic Plan: Partially, no PT yet  Pain: Worsening  Function: Worsening  Interval History: Patient has been wearing brace, ice.  She is very concerned about her knee and that it has been increasingly painful over last 3 weeks.  Swelling increased.  No locking, catching or giving way.      No CSI injection to date.  Diagnosed with Mild OA on XR    Additional medical/Social/Surgical histories reviewed in [facility] and updated as appropriate.    REVIEW OF SYSTEMS (6/29/2018)  CONSTITUTIONAL: Denies fever and weight loss  GASTROINTESTINAL: Denies abdominal pain, nausea, vomiting  MUSCULOSKELETAL: See HPI  SKIN: Denies any recent rash or lesion  NEUROLOGICAL: Denies numbness or focal weakness     PHYSICAL EXAM  /89  Pulse 93  Ht 1.702 m (5' 7\")  Wt 97.5 kg (215 lb)  BMI 33.67 kg/m2    General  - normal appearance, in no obvious distress  CV  - normal popliteal pulse  Pulm  - normal respiratory pattern, non-labored  Musculoskeletal - knee  - stance: normal gait without limp, no obvious leg length discrepancy  - inspection: trace effusion, generalized swelling, no ecchymosis, normal muscle tone, normal bone and joint alignment, no obvious deformity  - palpation: medial joint line tenderness and lateral joint line tenderness, patella and patellar tendon non-tender, normal popliteal pulse  - ROM: 110 degrees flexion, 0 degrees extension, pain at terminal extension passively  - strength: 5/5 in flexion, 5/5 in extension  - neuro: no sensory or " motor deficit  - special tests:  (-) Lachman  (-) anterior drawer  (-) posterior drawer  (-) pivot shift  (+) Mackenzie  (+) Thessaly  (-) varus at 0 and 30 degrees flexion  (-) valgus at 0 and 30 degrees flexion  (-) Alex s compression test  (-) patellar apprehension  Neuro  - no sensory or motor deficit, grossly normal coordination, normal muscle tone  Skin  - no ecchymosis, erythema, warmth, or induration, no obvious rash  Psych  - interactive, appropriate, normal mood and affect    IMAGING : XR Right knee mild tibiofemoral compartment narrowing.     ASSESSMENT & PLAN  Ms. Perez is a 68 year old year old female who presents to clinic today with persistent pain and swelling of right knee despite bracing, rest, ice and HEP.  Patient would rather only start PT after MRI.  Given persistent nature of sx, MRI is warranted at this time.    -Follow up after MRI; encouraged PT regardless of MRI    It was a pleasure seeing Nadira.    Willis Borjas, , CAQSM  Primary Care Sports Medicine

## 2018-07-06 ENCOUNTER — RADIANT APPOINTMENT (OUTPATIENT)
Dept: MRI IMAGING | Facility: CLINIC | Age: 68
End: 2018-07-06
Attending: FAMILY MEDICINE
Payer: MEDICARE

## 2018-07-06 DIAGNOSIS — M23.91 ACUTE INTERNAL DERANGEMENT OF KNEE, RIGHT: ICD-10-CM

## 2018-07-06 DIAGNOSIS — M25.561 ACUTE PAIN OF RIGHT KNEE: ICD-10-CM

## 2018-07-11 ENCOUNTER — OFFICE VISIT (OUTPATIENT)
Dept: ORTHOPEDICS | Facility: CLINIC | Age: 68
End: 2018-07-11
Payer: MEDICARE

## 2018-07-11 ENCOUNTER — OFFICE VISIT (OUTPATIENT)
Dept: AUDIOLOGY | Facility: CLINIC | Age: 68
End: 2018-07-11
Payer: MEDICARE

## 2018-07-11 VITALS
SYSTOLIC BLOOD PRESSURE: 165 MMHG | DIASTOLIC BLOOD PRESSURE: 110 MMHG | WEIGHT: 215 LBS | HEIGHT: 67 IN | BODY MASS INDEX: 33.74 KG/M2

## 2018-07-11 DIAGNOSIS — S83.241D TEAR OF MEDIAL MENISCUS OF RIGHT KNEE, UNSPECIFIED TEAR TYPE, UNSPECIFIED WHETHER OLD OR CURRENT TEAR, SUBSEQUENT ENCOUNTER: Primary | ICD-10-CM

## 2018-07-11 DIAGNOSIS — H90.3 SENSORINEURAL HEARING LOSS, BILATERAL: Primary | ICD-10-CM

## 2018-07-11 NOTE — MR AVS SNAPSHOT
After Visit Summary   7/11/2018    Nadira Perez    MRN: [id number]           Patient Information     Date Of Birth          1950        Visit Information        Provider Department      7/11/2018 9:30 AM Leyda Gil AuD M [facility] Audiology         Follow-ups after your visit        Your next 10 appointments already scheduled     Jul 11, 2018  9:30 AM CDT   Programmable Hearing Aid Check with Rik Horton [facility] Audiology ([facility])    [address] [place] [address]  4th Floor  [facility] [address] [phone]            Aug 20, 2018  1:00 PM CDT   RETURN CORNEA with Tyson Ruby MD   Eye Clinic ([facility])    [address]  [facility] [address]   [phone]            Nov 12, 2018  9:30 AM CST   [facility] Lab Draw with  [facility] DRAW   [facility] ([facility])    [address] [place] [address]  [address]  [facility] [id number] [phone]            Nov 12, 2018 10:00 AM CST   (Arrive by 9:45 AM)   Return Visit with Khushbu Louise MD   [facility] ([facility])    [address]  [address]  [facility] [id number] [phone]            Nov 12, 2018 10:30 AM CST   Infusion 60 with UC ONCOLOGY INFUSION, UC 31 ATC   [facility] ([facility])    [address] [place] [address]  [address]  [facility] [id number] [phone]              Who to contact     Please call your clinic at [phone] to:    Ask questions about your health    Make or cancel appointments    Discuss your medicines    Learn about your test results    Speak to your doctor            Additional Information About Your Visit        MyChart Information     [employer] gives you secure access to your electronic health record. If you see a primary care provider, you can also send messages  to your care team and make appointments. If you have questions, please call your primary care clinic.  If you do not have a primary care provider, please call [phone] and they will assist you.      [employer] is an electronic gateway that provides easy, online access to your medical records. With [employer], you can request a clinic appointment, read your test results, renew a prescription or communicate with your care team.     To access your existing account, please contact your [facility] or call [phone] for assistance.        Care EveryWhere ID     This is your Care EveryWhere ID. This could be used by other organizations to access your [place] medical records  [id number]         Blood Pressure from Last 3 Encounters:   06/28/18 153/89   06/06/18 (!) 157/98   05/14/18 145/82    Weight from Last 3 Encounters:   06/28/18 97.5 kg (215 lb)   06/06/18 97.5 kg (215 lb)   05/14/18 97.5 kg (215 lb)              Today, you had the following     No orders found for display       Primary Care Provider Office Phone # Fax #    Matilde Quiñonez, MERCEDES [phone]       [address] [place] [phone]  [facility] [address]        Equal Access to Services     EDEL LAMA : Hadii aad ku hadasho Soomaali, waaxda luqadaha, qaybta kaalmada adeegyada, waxay onealin haywilfredon seven seals . So [facility] [phone].    ATENCIÓN: Si habla español, tiene a davis disposición servicios gratuitos de asistencia lingüística. Llame al [phone].    We comply with applicable federal civil rights laws and [place] laws. We do not discriminate on the basis of race, color, national origin, age, disability, sex, sexual orientation, or gender identity.            Thank you!     Thank you for choosing [facility] AUDIOLOGY  for your care. Our goal is always to provide you with excellent care. Hearing back from our patients is one way we can continue to improve our services. Please take a few minutes to  complete the written survey that you may receive in the mail after your visit with us. Thank you!             Your Updated Medication List - Protect others around you: Learn how to safely use, store and throw away your medicines at www.disposemymeds.org.          This list is accurate as of 7/11/18  9:27 AM.  Always use your most recent med list.                   Brand Name Dispense Instructions for use Diagnosis    * amLODIPine 5 MG tablet    NORVASC    90 tablet    Take 1 tablet (5 mg) by mouth At Bedtime    Essential hypertension, benign       * amLODIPine 10 MG tablet    NORVASC    90 tablet    Take 1 tablet (10 mg) by mouth At Bedtime AS DIRECTED    Essential hypertension, benign       anastrozole 1 MG tablet    ARIMIDEX    90 tablet    Take 1 tablet (1 mg) by mouth daily    Malignant neoplasm of left breast in female, estrogen receptor positive, unspecified site of breast (H)       atorvastatin 40 MG tablet    LIPITOR    90 tablet    Take 1 tablet (40 mg) by mouth daily Please put on profile- pt just received 90-day supply of cholesterol meds    Pure hypercholesterolemia       * cholecalciferol 1000 UNIT tablet    vitamin D3    100 tablet    Take 1 tablet (1,000 Units) by mouth daily    Osteoporosis       * cholecalciferol 5000 units Caps capsule    vitamin D3    120 capsule    Take 1 capsule (5,000 Units) by mouth daily    Vaginal dryness       CoQ10 200 MG Caps      Take 1 capsule by mouth daily        * diclofenac 1 % Gel topical gel    VOLTAREN    100 g    Apply 4 grams to knees or 2 grams to hands four times daily using enclosed dosing card.    Primary osteoarthritis of both wrists       * VOLTAREN 1 % Gel topical gel   Generic drug:  diclofenac     100 g    Apply 4 grams to knees or 2 grams to hands four times daily using enclosed dosing card.    Right knee pain, unspecified chronicity       gabapentin 300 MG capsule    NEURONTIN    540 capsule    1-2 tablets EVERY 8 HOURS    Neuropathic pain        HYDROcodone-acetaminophen 5-325 MG per tablet    NORCO    40 tablet    Take 1 tablet by mouth every 6 hours as needed    Chronic left-sided low back pain, with sciatica presence unspecified       ketoconazole 2 % shampoo    NIZORAL    120 mL    Apply topically daily as needed for itching or irritation    Dermatitis, seborrheic       ketorolac 0.5 % ophthalmic solution    ACULAR     Apply 1 drop to eye        levothyroxine 112 MCG tablet    SYNTHROID/LEVOTHROID    45 tablet    Take 0.5 tab daily    Hypothyroidism, unspecified type       LORazepam 2 MG tablet    ATIVAN    30 tablet    Take 1 tablet at night for sleep    Sleep disorder       losartan 25 MG tablet    COZAAR    90 tablet    Take 1 tablet (25 mg) by mouth daily    Essential hypertension, benign       metoprolol succinate 25 MG 24 hr tablet    TOPROL-XL    90 tablet    Take 1 tablet (25 mg) by mouth daily    Irregular heart rate       Multi-vitamin Tabs tablet   Generic drug:  multivitamin, therapeutic with minerals      Take 1 tablet by mouth daily.        ofloxacin 0.3 % ophthalmic solution    OCUFLOX     Apply 1 drop to eye        OMEGA-3 FISH OIL PO      Take 1 capsule by mouth daily        omeprazole 20 MG CR capsule    priLOSEC     Take 20 mg by mouth daily        ondansetron 4 MG ODT tab    ZOFRAN-ODT    90 tablet    1-2 tabs po prn every 6 hours    Nausea       * prednisoLONE acetate 1 % ophthalmic susp    PRED FORTE    1 Bottle    Place 1 drop Into the left eye 3 times daily    Postoperative eye state       * prednisoLONE acetate 1 % ophthalmic susp    PRED FORTE    1 Bottle    Place 1 drop into the right eye 3 times daily    Postoperative eye state       REPHRESH Gel     14 Box    Place 2 g vaginally every 3 days    Urinary tract infection, site not specified       terbinafine 1 % cream    lamISIL AT    12 g    Apply topically 2 times daily For fungal infection not resolved with other antifungals (e.g. Clotrimazole)    Tinea pedis of both feet        triamcinolone 0.1 % ointment    KENALOG    30 g    Apply topically 2 times daily    Eczematous dermatitis       triamterene-hydrochlorothiazide 37.5-25 MG per tablet    MAXZIDE-25    90 tablet    Take 1 tablet by mouth daily    Essential hypertension, benign       * Notice:  This list has 8 medication(s) that are the same as other medications prescribed for you. Read the directions carefully, and ask your doctor or other care provider to review them with you.

## 2018-07-11 NOTE — LETTER
2018       RE: Nadira Perez  [address]  [facility] [address]     Dear Colleague,    Thank you for referring your patient, Nadira Perez, to the [facility] SPORTS AND ORTHOPAEDIC WALK IN CLINIC at [facility]. Please see a copy of my visit note below.          SPORTS & ORTHOPEDIC WALK-IN FOLLOW-UP VISIT 2018    Interval History:     Follow up reason: MRI results    Date of injury: 18    Date last seen: 18    Following Therapeutic Plan: Yes     Pain: Unchanged    Function: Unchanged    Interval History:  Here for the MRI results. Her pain got better and then worse. She is here to get her options.     Medical History:    Any recent changes to your medical history? No    Any new medication prescribed since last visit? No    Review of Systems:    Do you have fever, chills, weight loss? No    Do you have any vision problems? No    Do you have any chest pain or edema? No    Do you have any shortness of breath or wheezing?  No    Do you have stomach problems? No    Do you have any numbness or focal weakness? No    Do you have diabetes? No    Do you have problems with bleeding or clotting? No    Do you have an rashes or other skin lesions? No             [facility]  Orthopedics  Willis Borjas, DO  2018     Name: Nadira Perez  MRN: [id number]  Age: 68 year old  : 1950  Referring provider: Willis Borjas     Chief Complaint: Right knee pain      Date of Injury: 18    History of Present Illness:   Nadira Perez is a 68 year old female with a history osteopenia and hyperparathyroidism who presents today for follow-up regarding left knee pain. I last evaluated the patient on 18, at which time she had already been evaluated by two other providers at the [facility], after twisting her knee. She did have improvement, but prior to her return with me the pain was worsening. At the time, she also had increased swelling but no locking,  "catching, or giving way. These symptoms had continued despite icing, rest, bracing, and HEP. XR showed mild osteoarthritis.  Plan was for MRI and to start formal physical therapy regardless of imaging. Today, she reports that her pain has continued and she is tolerating physical therapy. She has been resting as much as possible with continued rest, bracing, icing, Tylenol, and Vicodin with minimal some relief. She is unable to use crutches due to previous arm issues. She is also using a lift brace on her right foot.     Review of Systems:   (7/11/2018)  CONSTITUTIONAL: Denies fever and weight loss  EYES: Denies acute vision changes  ENT: Denies hearing changes or difficulty swallowing  CARDIAC: Denies chest pain or edema  RESPIRATORY: Denies dyspnea, cough or wheeze  GASTROINTESTINAL: Denies abdominal pain, nausea, vomiting  MUSCULOSKELETAL: See HPI  SKIN: Denies any recent rash or lesion  NEUROLOGICAL: Denies numbness or focal weakness  ENDOCRINE: Diagnosis of diabetes:   Lab Results   Component Value Date    A1C 5.9 11/21/2017    A1C 6.0 10/27/2016    A1C 6.0 10/30/2015    A1C 5.5 11/06/2014    A1C 5.9 12/19/2013     HEMATOLOGY: Denies episodes of easy bleeding      Physical Examination:  Blood pressure (!) 165/110, height 1.702 m (5' 7\"), weight 97.5 kg (215 lb), not currently breastfeeding.  General  - normal appearance, in no obvious distress  CV  - normal popliteal pulse  Pulm  - normal respiratory pattern, non-labored  Musculoskeletal - right knee  - stance: normal gait without limp, normal single leg squat, no obvious leg length discrepancy  - inspection: no swelling or effusion, normal bone and joint alignment, no obvious deformity  - palpation: Medial patellar facet tenderness. Medial joint line tenderness. Medial tibial plateau tenderness.   - ROM: 130 degrees flexion, -5 degrees extension, not painful, normal actively and passively compared to contralateral  - strength: 5/5 in flexion, 5/5 in " extension  - special tests:  (-) Lachman  (-) anterior drawer  (-) posterior drawer  (-) pivot shift  (-) Mackenzie  (-) Thessaly  (-) varus at 0 and 30 degrees flexion  (-) valgus at 0 and 30 degrees flexion  (-) Alex s compression test  (-) patellar apprehension    Neuro  - no sensory or motor deficit, grossly normal coordination, normal muscle tone  Skin  - no ecchymosis, erythema, warmth, or induration, no obvious rash  Psych  - interactive, appropriate, normal mood and affect    Radiographs:   Right knee MRI without contrast on 7/6/18.   Impression:  1. Free edge tear of the medial meniscus body and posterior horn.  2. Focal subchondral marrow edema in the medial femoral condyle and  medial tibial plateau, appears disproportional to amount of overlying  chondral loss, concerning for insufficiency fractures.  3. Diffuse moderate chondral loss of the patellar articular cartilage.  4. Pes anserine bursitis with moderate fluid within the pes anserine  bursa.     ERIK AUSTIN I have independently reviewed the above imaging studies; the results were discussed with the patient.     Assessment:   68 year old female with left knee pain with continued pain despite physical therapy, bracing, ice, rest, Tylenol, and her own Vicodin.    Diagnosis:   Right knee pain   Tears of medial meniscus of right knee  Bone contusion of medial femoral condyle, possibly insufficiency fracture w/o collapse  H/o osteopenia - last dexa 2017 low bone density    Plan:   - Medial  brace   - Activity modifications to decrease weightbearing.  - Tylenol PRN   - HTN medications  - Continue bisphosphonate per PCP, Vitamin D, Ca  - Follow up in 4 weeks; will discuss the possibility of injection for MMT if not improved.     IWillis DO, have reviewed the above note and agree with the scribe's notation as written.    Scribe Disclosure:   I, Sunny Denny, am serving as a scribe to document services personally performed by  Willis Borjas DO at this visit, based upon the provider's statements to me. All documentation has been reviewed by the aforementioned provider prior to being entered into the official medical record.     Portions of this medical record were completed by a scribe. UPON MY REVIEW AND AUTHENTICATION BY ELECTRONIC SIGNATURE, this confirms (a) I performed the applicable clinical services, and (b) the record is accurate.     Again, thank you for allowing me to participate in the care of your patient.      Sincerely,    Willis Borjas DO

## 2018-07-11 NOTE — LETTER
Date:July 16, 2018      Patient was self referred, no letter generated. Do not send.        [facility] Health Information

## 2018-07-11 NOTE — PROGRESS NOTES
[facility]  Orthopedics  Willis Borjas, DO  2018     Name: Nadira Perez  MRN: [id number]  Age: 68 year old  : 1950  Referring provider: Willis Borjas     Chief Complaint: Right knee pain      Date of Injury: 18    History of Present Illness:   Nadira Perez is a 68 year old female with a history osteopenia and hyperparathyroidism who presents today for follow-up regarding left knee pain. I last evaluated the patient on 18, at which time she had already been evaluated by two other providers at the [facility], after twisting her knee. She did have improvement, but prior to her return with me the pain was worsening. At the time, she also had increased swelling but no locking, catching, or giving way. These symptoms had continued despite icing, rest, bracing, and HEP. XR showed mild osteoarthritis.  Plan was for MRI and to start formal physical therapy regardless of imaging. Today, she reports that her pain has continued and she is tolerating physical therapy. She has been resting as much as possible with continued rest, bracing, icing, Tylenol, and Vicodin with minimal some relief. She is unable to use crutches due to previous arm issues. She is also using a lift brace on her right foot.     Review of Systems:   (2018)  CONSTITUTIONAL: Denies fever and weight loss  EYES: Denies acute vision changes  ENT: Denies hearing changes or difficulty swallowing  CARDIAC: Denies chest pain or edema  RESPIRATORY: Denies dyspnea, cough or wheeze  GASTROINTESTINAL: Denies abdominal pain, nausea, vomiting  MUSCULOSKELETAL: See HPI  SKIN: Denies any recent rash or lesion  NEUROLOGICAL: Denies numbness or focal weakness  ENDOCRINE: Diagnosis of diabetes:   Lab Results   Component Value Date    A1C 5.9 2017    A1C 6.0 10/27/2016    A1C 6.0 10/30/2015    A1C 5.5 2014    A1C 5.9 2013     HEMATOLOGY: Denies episodes of easy bleeding      Physical Examination:  Blood pressure (!) 165/110,  "height 1.702 m (5' 7\"), weight 97.5 kg (215 lb), not currently breastfeeding.  General  - normal appearance, in no obvious distress  CV  - normal popliteal pulse  Pulm  - normal respiratory pattern, non-labored  Musculoskeletal - right knee  - stance: normal gait without limp, normal single leg squat, no obvious leg length discrepancy  - inspection: no swelling or effusion, normal bone and joint alignment, no obvious deformity  - palpation: Medial patellar facet tenderness. Medial joint line tenderness. Medial tibial plateau tenderness.   - ROM: 130 degrees flexion, -5 degrees extension, not painful, normal actively and passively compared to contralateral  - strength: 5/5 in flexion, 5/5 in extension  - special tests:  (-) Lachman  (-) anterior drawer  (-) posterior drawer  (-) pivot shift  (-) Mackenzie  (-) Thessaly  (-) varus at 0 and 30 degrees flexion  (-) valgus at 0 and 30 degrees flexion  (-) Alex s compression test  (-) patellar apprehension    Neuro  - no sensory or motor deficit, grossly normal coordination, normal muscle tone  Skin  - no ecchymosis, erythema, warmth, or induration, no obvious rash  Psych  - interactive, appropriate, normal mood and affect    Radiographs:   Right knee MRI without contrast on 7/6/18.   Impression:  1. Free edge tear of the medial meniscus body and posterior horn.  2. Focal subchondral marrow edema in the medial femoral condyle and  medial tibial plateau, appears disproportional to amount of overlying  chondral loss, concerning for insufficiency fractures.  3. Diffuse moderate chondral loss of the patellar articular cartilage.  4. Pes anserine bursitis with moderate fluid within the pes anserine  bursa.     ERIK AUSTIN I have independently reviewed the above imaging studies; the results were discussed with the patient.     Assessment:   68 year old female with rt knee pain with continued pain despite physical therapy, bracing, ice, rest, Tylenol, and her own " Vicodin.    Diagnosis:   Right knee pain   Tears of medial meniscus of right knee  Bone contusion of medial femoral condyle, possibly insufficiency fracture w/o collapse  H/o osteopenia - last dexa 2017 low bone density    Plan:   - Medial  brace   - Activity modifications to decrease weightbearing.  - Tylenol PRN   - HTN medications  - Continue bisphosphonate per PCP, Vitamin D, Ca  - Follow up in 4 weeks; will discuss the possibility of injection for MMT if not improved.     I, Willis Borjas DO, have reviewed the above note and agree with the scribe's notation as written.    Scribe Disclosure:   I, Sunny Denny, am serving as a scribe to document services personally performed by Willis Borjas DO at this visit, based upon the provider's statements to me. All documentation has been reviewed by the aforementioned provider prior to being entered into the official medical record.     Portions of this medical record were completed by a scribe. UPON MY REVIEW AND AUTHENTICATION BY ELECTRONIC SIGNATURE, this confirms (a) I performed the applicable clinical services, and (b) the record is accurate.

## 2018-07-11 NOTE — PROGRESS NOTES
.AUDIOLOGY REPORT    SUBJECTIVE:   Nadira Perez is a 67 year old female who was seen in Audiology at the [facility] on 7/11/18 for a follow-up check regarding the fitting of new hearing aids. Previous results have revealed a bilateral sensorineural hearing loss.  The patient has been seen previously in this clinic and was fit with bilateral Widex Beyond Fusion 330 on 2/5/2018.  Nadira reports that she is here to review use of the iPhone khoa. She also reported that she recently saw her brother who has the [employer] Khoa and was interested in having the options of all of the manual programs that she can.    OBJECTIVE:   Hearing aids were cleaned and checked, listening check was good.    We connected her hearing aids to her phone and reviewed use of the khoa. I added a manual party and quiet program but explained that the universal setting does those things automatically with the sound classes however she requested to try them.    ASSESSMENT:   A hearing aid check was performed. Hearing aids were connected to her iPhone.    PLAN:   Nadira will return for follow-up as needed, or at least every 6-9 months for cleaning and assessment of hearing aid.   Please call this clinic with any questions regarding today s appointment.    Yamini Lange, [facility]  Licensed Audiologist  [place] License #[id number]

## 2018-07-11 NOTE — PROGRESS NOTES
SPORTS & ORTHOPEDIC WALK-IN FOLLOW-UP VISIT 7/11/2018    Interval History:     Follow up reason: MRI results    Date of injury: 4/12/18    Date last seen: 6/28/18    Following Therapeutic Plan: Yes     Pain: Unchanged    Function: Unchanged    Interval History:  Here for the MRI results. Her pain got better and then worse. She is here to get her options.     Medical History:    Any recent changes to your medical history? No    Any new medication prescribed since last visit? No    Review of Systems:    Do you have fever, chills, weight loss? No    Do you have any vision problems? No    Do you have any chest pain or edema? No    Do you have any shortness of breath or wheezing?  No    Do you have stomach problems? No    Do you have any numbness or focal weakness? No    Do you have diabetes? No    Do you have problems with bleeding or clotting? No    Do you have an rashes or other skin lesions? No

## 2018-07-11 NOTE — MR AVS SNAPSHOT
After Visit Summary   7/11/2018    Nadira Perez    MRN: [id number]           Patient Information     Date Of Birth          1950        Visit Information        Provider Department      7/11/2018 10:20 AM Willis Borjas DO [facility]        Today's Diagnoses     Tear of medial meniscus of right knee, unspecified tear type, unspecified whether old or current tear, subsequent encounter    -  1       Follow-ups after your visit        Additional Services     ORTHOTICS REFERRAL       **This referral order prints off in the [facility]  (Orthotics & Prosthetics) Central Scheduling Office**    The [facility] Central Scheduling Staff will contact the patient to schedule appointments.     Central Scheduling Contact Information: [phone] ([place])    Orthotics: Right Knee Brace - Medial     Please be aware that coverage of these services is subject to the terms and limitations of your health insurance plan.  Call member services at your health plan with any benefit or coverage questions.      Please bring the following to your appointment:    >>   Any x-rays, CTs or MRIs which have been performed.  Contact the facility where they were done to arrange for  prior to your scheduled appointment.    >>   List of current medications   >>   This referral request   >>   Any documents/labs given to you for this referral                  Your next 10 appointments already scheduled     Aug 20, 2018  1:00 PM CDT   RETURN CORNEA with Tyson Ruby MD   Eye Clinic ([facility])    [address]  [facility] [address]   [phone]            Nov 12, 2018  9:30 AM [facility]   [facility] Lab Draw with  [facility] LAB DRAW   [facility] Lab Draw ([facility])    [address]  [address]  [facility] [address] [phone]            Nov 12,  "2018 10:00 AM CST   (Arrive by 9:45 AM)   Return Visit with Khushbu Louise MD   [facility] ([facility])    [address]  [address]  [facility] [address] [phone]            Nov 12, 2018 10:30 AM CST   Infusion 60 with UC ONCOLOGY INFUSION, UC 31 ATC   [facility] ([facility])    [address]  [address]  [facility] [address] [phone]              Who to contact     Please call your clinic at [phone] to:    Ask questions about your health    Make or cancel appointments    Discuss your medicines    Learn about your test results    Speak to your doctor            Additional Information About Your Visit        Re[employer] Software Information     [employer] gives you secure access to your electronic health record. If you see a primary care provider, you can also send messages to your care team and make appointments. If you have questions, please call your primary care clinic.  If you do not have a primary care provider, please call [phone] and they will assist you.      [employer] is an electronic gateway that provides easy, online access to your medical records. With [employer], you can request a clinic appointment, read your test results, renew a prescription or communicate with your care team.     To access your existing account, please contact your [facility] or call [phone] for assistance.        Care EveryWhere ID     This is your Care EveryWhere ID. This could be used by other organizations to access your [place] medical records  [id number]        Your Vitals Were     Height BMI (Body Mass Index)                1.702 m (5' 7\") 33.67 kg/m2           Blood Pressure from Last 3 Encounters:   07/11/18 (!) 165/110   06/28/18 153/89   06/06/18 (!) 157/98    Weight from Last 3 Encounters:   07/11/18 97.5 kg (215 lb)   06/28/18 97.5 kg (215 lb)   06/06/18 97.5 kg " (215 lb)              We Performed the Following     ORTHOTICS REFERRAL        Primary Care Provider Office Phone # Fax #    Matilde Quiñonez, APRN -[phone]       [address] [place] [address] [address] [place] [address]        Equal Access to Services     EDEL LAMA : Hadii jacinta torres hadchelsieo Soomaali, waaxda luqadaha, qaybta kaalmada adeegyada, waxay idiin hayaan adehari khmirzanathanael seals . So [facility] [phone].    ATENCIÓN: Si habla español, tiene a davis disposición servicios gratuitos de asistencia lingüística. Llame al [phone].    We comply with applicable federal civil rights laws and [place] laws. We do not discriminate on the basis of race, color, national origin, age, disability, sex, sexual orientation, or gender identity.            Thank you!     Thank you for choosing [facility]  for your care. Our goal is always to provide you with excellent care. Hearing back from our patients is one way we can continue to improve our services. Please take a few minutes to complete the written survey that you may receive in the mail after your visit with us. Thank you!             Your Updated Medication List - Protect others around you: Learn how to safely use, store and throw away your medicines at www.disposemymeds.org.          This list is accurate as of 7/11/18 11:59 PM.  Always use your most recent med list.                   Brand Name Dispense Instructions for use Diagnosis    * amLODIPine 5 MG tablet    NORVASC    90 tablet    Take 1 tablet (5 mg) by mouth At Bedtime    Essential hypertension, benign       * amLODIPine 10 MG tablet    NORVASC    90 tablet    Take 1 tablet (10 mg) by mouth At Bedtime AS DIRECTED    Essential hypertension, benign       anastrozole 1 MG tablet    ARIMIDEX    90 tablet    Take 1 tablet (1 mg) by mouth daily    Malignant neoplasm of left breast in female, estrogen receptor positive, unspecified site of breast (H)       atorvastatin 40  MG tablet    LIPITOR    90 tablet    Take 1 tablet (40 mg) by mouth daily Please put on profile- pt just received 90-day supply of cholesterol meds    Pure hypercholesterolemia       * cholecalciferol 1000 UNIT tablet    vitamin D3    100 tablet    Take 1 tablet (1,000 Units) by mouth daily    Osteoporosis       * cholecalciferol 5000 units Caps capsule    vitamin D3    120 capsule    Take 1 capsule (5,000 Units) by mouth daily    Vaginal dryness       CoQ10 200 MG Caps      Take 1 capsule by mouth daily        * diclofenac 1 % Gel topical gel    VOLTAREN    100 g    Apply 4 grams to knees or 2 grams to hands four times daily using enclosed dosing card.    Primary osteoarthritis of both wrists       * VOLTAREN 1 % Gel topical gel   Generic drug:  diclofenac     100 g    Apply 4 grams to knees or 2 grams to hands four times daily using enclosed dosing card.    Right knee pain, unspecified chronicity       gabapentin 300 MG capsule    NEURONTIN    540 capsule    1-2 tablets EVERY 8 HOURS    Neuropathic pain       HYDROcodone-acetaminophen 5-325 MG per tablet    NORCO    40 tablet    Take 1 tablet by mouth every 6 hours as needed    Chronic left-sided low back pain, with sciatica presence unspecified       ketoconazole 2 % shampoo    NIZORAL    120 mL    Apply topically daily as needed for itching or irritation    Dermatitis, seborrheic       ketorolac 0.5 % ophthalmic solution    ACULAR     Apply 1 drop to eye        levothyroxine 112 MCG tablet    SYNTHROID/LEVOTHROID    45 tablet    Take 0.5 tab daily    Hypothyroidism, unspecified type       LORazepam 2 MG tablet    ATIVAN    30 tablet    Take 1 tablet at night for sleep    Sleep disorder       losartan 25 MG tablet    COZAAR    90 tablet    Take 1 tablet (25 mg) by mouth daily    Essential hypertension, benign       metoprolol succinate 25 MG 24 hr tablet    TOPROL-XL    90 tablet    Take 1 tablet (25 mg) by mouth daily    Irregular heart rate       Multi-vitamin  Tabs tablet   Generic drug:  multivitamin, therapeutic with minerals      Take 1 tablet by mouth daily.        ofloxacin 0.3 % ophthalmic solution    OCUFLOX     Apply 1 drop to eye        OMEGA-3 FISH OIL PO      Take 1 capsule by mouth daily        omeprazole 20 MG CR capsule    priLOSEC     Take 20 mg by mouth daily        ondansetron 4 MG ODT tab    ZOFRAN-ODT    90 tablet    1-2 tabs po prn every 6 hours    Nausea       * prednisoLONE acetate 1 % ophthalmic susp    PRED FORTE    1 Bottle    Place 1 drop Into the left eye 3 times daily    Postoperative eye state       * prednisoLONE acetate 1 % ophthalmic susp    PRED FORTE    1 Bottle    Place 1 drop into the right eye 3 times daily    Postoperative eye state       REPHRESH Gel     14 Box    Place 2 g vaginally every 3 days    Urinary tract infection, site not specified       terbinafine 1 % cream    lamISIL AT    12 g    Apply topically 2 times daily For fungal infection not resolved with other antifungals (e.g. Clotrimazole)    Tinea pedis of both feet       triamcinolone 0.1 % ointment    KENALOG    30 g    Apply topically 2 times daily    Eczematous dermatitis       triamterene-hydrochlorothiazide 37.5-25 MG per tablet    MAXZIDE-25    90 tablet    Take 1 tablet by mouth daily    Essential hypertension, benign       * Notice:  This list has 8 medication(s) that are the same as other medications prescribed for you. Read the directions carefully, and ask your doctor or other care provider to review them with you.

## 2018-07-17 ENCOUNTER — MYC MEDICAL ADVICE (OUTPATIENT)
Dept: INTERNAL MEDICINE | Facility: CLINIC | Age: 68
End: 2018-07-17

## 2018-07-17 ENCOUNTER — TELEPHONE (OUTPATIENT)
Dept: FAMILY MEDICINE | Facility: CLINIC | Age: 68
End: 2018-07-17

## 2018-07-17 NOTE — TELEPHONE ENCOUNTER
"[facility] Call Center    Phone Message    May a detailed message be left on voicemail: yes    Reason for Call: Symptoms or Concerns     If patient has red-flag symptoms, warm transfer to triage line    Current symptom or concern: High blood pressure - requesting to speak with Nikki - had blood pressure medication reduced two weeks ago and is \"not doing the trick\"    Symptoms have been present for:  2-3 day(s)    Has patient previously been seen for this? No    By : Dr. Todd    Date: n/a    Are there any new or worsening symptoms? No      Action Taken: Message routed to:  [facility] PCC  "

## 2018-07-19 NOTE — TELEPHONE ENCOUNTER
[facility] Call Center    Phone Message    May a detailed message be left on voicemail: yes    Reason for Call: Other: The pt called to make an appt regarding the fact that her BP meds are not working, and she can't take the pain meds that she needs. SHe is going out of [facility] so was trying to come in asap. The first avail is 8.1.18. I put her oin the wait list, she will call for cancellations, can you bring her in sooner? She is concerned about her BP going up. Please call to discuss.     Action Taken: Message routed to:  [facility] ([facility] med

## 2018-07-24 ENCOUNTER — OFFICE VISIT (OUTPATIENT)
Dept: INTERNAL MEDICINE | Facility: CLINIC | Age: 68
End: 2018-07-24
Payer: MEDICARE

## 2018-07-24 VITALS — HEART RATE: 99 BPM | SYSTOLIC BLOOD PRESSURE: 136 MMHG | DIASTOLIC BLOOD PRESSURE: 90 MMHG

## 2018-07-24 DIAGNOSIS — I10 BENIGN ESSENTIAL HYPERTENSION: Primary | ICD-10-CM

## 2018-07-24 RX ORDER — LISINOPRIL 20 MG/1
20 TABLET ORAL DAILY
Qty: 30 TABLET | Refills: 3 | Status: SHIPPED | OUTPATIENT
Start: 2018-07-24 | End: 2018-10-18

## 2018-07-24 ASSESSMENT — PAIN SCALES - GENERAL: PAINLEVEL: SEVERE PAIN (6)

## 2018-07-24 NOTE — MR AVS SNAPSHOT
After Visit Summary   7/24/2018    Nadira Perez    MRN: [id number]           Patient Information     Date Of Birth          1950        Visit Information        Provider Department      7/24/2018 4:45 PM Matilde Quiñonez APRN CNP [facility]        Today's Diagnoses     Benign essential hypertension    -  1       Follow-ups after your visit        Your next 10 appointments already scheduled     Aug 01, 2018  3:15 PM CDT   (Arrive by 3:00 PM)   Return Visit with MERCEDES Rivera CNP [facility] ([facility])    [address] [place] [address]  4th Floor  [facility] [address]   [phone]            Aug 20, 2018  1:00 PM CDT   RETURN CORNEA with Tyson Ruby MD   Eye Clinic ([facility])    [address]  [facility] [address]   [phone]            Nov 12, 2018  9:30 AM CST   [facility] Lab Draw with  M[facility]IC LAB DRAW   [facility] Lab Draw ([facility])    [address] [place] [address]  [address]  [facility] [address] [phone]            Nov 12, 2018 10:00 AM CST   (Arrive by 9:45 AM)   Return Visit with Khushbu Louise MD   [facility] ([facility])    [address] [place] [address]  [address]  [facility] [address] [phone]            Nov 12, 2018 10:30 AM CST   Infusion 60 with  ONCOLOGY INFUSION, UC 31 ATC   [facility] ([facility])    [address] [place] [address]  [address]  [facility] [address] [phone]              Who to contact     Please call your clinic at [phone] to:    Ask questions about your health    Make or cancel appointments    Discuss your medicines    Learn about your test results    Speak to your doctor            Additional Information About Your Visit        MyChart Information     MyChart gives you secure  access to your electronic health record. If you see a primary care provider, you can also send messages to your care team and make appointments. If you have questions, please call your primary care clinic.  If you do not have a primary care provider, please call [phone] and they will assist you.      [employer] is an electronic gateway that provides easy, online access to your medical records. With [facility] [facility], you can request a clinic appointment, read your test results, renew a prescription or communicate with your care team.     To access your existing account, please contact your [facility] or call [phone] for assistance.        Care EveryWhere ID     This is your Care EveryWhere ID. This could be used by other organizations to access your [place] medical records  [id number]        Your Vitals Were     Pulse                   99            Blood Pressure from Last 3 Encounters:   07/24/18 (P) 134/85   07/11/18 (!) 165/110   06/28/18 153/89    Weight from Last 3 Encounters:   07/11/18 97.5 kg (215 lb)   06/28/18 97.5 kg (215 lb)   06/06/18 97.5 kg (215 lb)              Today, you had the following     No orders found for display         Today's Medication Changes          These changes are accurate as of 7/24/18  5:36 PM.  If you have any questions, ask your nurse or doctor.               Start taking these medicines.        Dose/Directions    lisinopril 20 MG tablet   Commonly known as:  PRINIVIL/ZESTRIL   Used for:  Benign essential hypertension   Started by:  Matilde Quiñonez APRN CNP        Dose:  20 mg   Take 1 tablet (20 mg) by mouth daily   Quantity:  30 tablet   Refills:  3         Stop taking these medicines if you haven't already. Please contact your care team if you have questions.     ketorolac 0.5 % ophthalmic solution   Commonly known as:  ACULAR   Stopped by:  Matilde Quiñonez APRN CNP           losartan 25 MG tablet   Commonly known as:  COZAAR   Stopped  by:  Matilde Quiñonez APRN CNP           ofloxacin 0.3 % ophthalmic solution   Commonly known as:  OCUFLOX   Stopped by:  Matilde Quiñonez APRN CNP           omeprazole 20 MG CR capsule   Commonly known as:  priLOSEC   Stopped by:  Matilde Quiñonez APRN CNP           prednisoLONE acetate 1 % ophthalmic susp   Commonly known as:  PRED FORTE   Stopped by:  Matilde Quiñonez APRN CNP                Where to get your medicines      These medications were sent to [id number] - [place], [place] - [address] [address]  [address] [address] [address]     Phone:  [phone]     lisinopril 20 MG tablet                Primary Care Provider Office Phone # Fax #    MERCEDES Rivera [phone]       [address] [place] [address] [address] [place] [address]        Equal Access to Services     [facility] Hospital[facility] : Hadii jacinta torres hadasho Soomaali, waaxda luqadaha, qaybta kaalmada adeegyada, cierra noin hayjuan seals . So [facility] [phone].    ATENCIÓN: Si habla español, tiene a davis disposición servicios gratuitos de asistencia lingüística. Llame al [phone].    We comply with applicable federal civil rights laws and [place] laws. We do not discriminate on the basis of race, color, national origin, age, disability, sex, sexual orientation, or gender identity.            Thank you!     Thank you for choosing [facility]  for your care. Our goal is always to provide you with excellent care. Hearing back from our patients is one way we can continue to improve our services. Please take a few minutes to complete the written survey that you may receive in the mail after your visit with us. Thank you!             Your Updated Medication List - Protect others around you: Learn how to safely use, store and throw away your medicines at www.disposemymeds.org.          This list is accurate as of 7/24/18  5:36 PM.  Always use your most recent med list.                    Brand Name Dispense Instructions for use Diagnosis    amLODIPine 5 MG tablet    NORVASC    90 tablet    Take 1 tablet (5 mg) by mouth At Bedtime    Essential hypertension, benign       anastrozole 1 MG tablet    ARIMIDEX    90 tablet    Take 1 tablet (1 mg) by mouth daily    Malignant neoplasm of left breast in female, estrogen receptor positive, unspecified site of breast (H)       atorvastatin 40 MG tablet    LIPITOR    90 tablet    Take 1 tablet (40 mg) by mouth daily Please put on profile- pt just received 90-day supply of cholesterol meds    Pure hypercholesterolemia       * cholecalciferol 1000 UNIT tablet    vitamin D3    100 tablet    Take 1 tablet (1,000 Units) by mouth daily    Osteoporosis       * cholecalciferol 5000 units Caps capsule    vitamin D3    120 capsule    Take 1 capsule (5,000 Units) by mouth daily    Vaginal dryness       CoQ10 200 MG Caps      Take 1 capsule by mouth daily        * diclofenac 1 % Gel topical gel    VOLTAREN    100 g    Apply 4 grams to knees or 2 grams to hands four times daily using enclosed dosing card.    Primary osteoarthritis of both wrists       * VOLTAREN 1 % Gel topical gel   Generic drug:  diclofenac     100 g    Apply 4 grams to knees or 2 grams to hands four times daily using enclosed dosing card.    Right knee pain, unspecified chronicity       gabapentin 300 MG capsule    NEURONTIN    540 capsule    1-2 tablets EVERY 8 HOURS    Neuropathic pain       HYDROcodone-acetaminophen 5-325 MG per tablet    NORCO    40 tablet    Take 1 tablet by mouth every 6 hours as needed    Chronic left-sided low back pain, with sciatica presence unspecified       ketoconazole 2 % shampoo    NIZORAL    120 mL    Apply topically daily as needed for itching or irritation    Dermatitis, seborrheic       levothyroxine 112 MCG tablet    SYNTHROID/LEVOTHROID    45 tablet    Take 0.5 tab daily    Hypothyroidism, unspecified type       lisinopril 20 MG tablet    PRINIVIL/ZESTRIL    30  tablet    Take 1 tablet (20 mg) by mouth daily    Benign essential hypertension       LORazepam 2 MG tablet    ATIVAN    30 tablet    Take 1 tablet at night for sleep    Sleep disorder       metoprolol succinate 25 MG 24 hr tablet    TOPROL-XL    90 tablet    Take 1 tablet (25 mg) by mouth daily    Irregular heart rate       Multi-vitamin Tabs tablet   Generic drug:  multivitamin, therapeutic with minerals      Take 1 tablet by mouth daily.        OMEGA-3 FISH OIL PO      Take 1 capsule by mouth daily        ondansetron 4 MG ODT tab    ZOFRAN-ODT    90 tablet    1-2 tabs po prn every 6 hours    Nausea       REPHRESH Gel     14 Box    Place 2 g vaginally every 3 days    Urinary tract infection, site not specified       terbinafine 1 % cream    lamISIL AT    12 g    Apply topically 2 times daily For fungal infection not resolved with other antifungals (e.g. Clotrimazole)    Tinea pedis of both feet       triamcinolone 0.1 % ointment    KENALOG    30 g    Apply topically 2 times daily    Eczematous dermatitis       triamterene-hydrochlorothiazide 37.5-25 MG per tablet    MAXZIDE-25    90 tablet    Take 1 tablet by mouth daily    Essential hypertension, benign       * Notice:  This list has 4 medication(s) that are the same as other medications prescribed for you. Read the directions carefully, and ask your doctor or other care provider to review them with you.

## 2018-07-24 NOTE — NURSING NOTE
Chief Complaint   Patient presents with     Hypertension     Blood pressure continues to remain elevated, would like to discuss changing medication.        Rooming Note  Blood Pressure   BP Readings from Last 1 Encounters:   07/24/18 136/90    Single BP recheck started, 5:01 PM (3 minutes)

## 2018-07-24 NOTE — PROGRESS NOTES
[facility]  Primary Care Center   Matilde BENSONFani Olamide, MERCEDES CNP  07/24/2018      Chief Complaint:   Hypertension     History of Present Illness:   Nadira Perez is a 68 year old female with a history of inflammatory breast cancer s/p radiation therapy, hypertension and hypothyroidism who presents for evaluation of hypertension.    Hypertension: She is on Norvasc and was advised to switch to an ace inhibitor in 2017 because she was concerned that she might develop a cough.. She started Losartan 25 mg, which does not seem to be helping as much as she would like. She also does not like it because she wants to be able to take NSAIDs.   She brings in with her extensive documentation of BPs from 6/30 through 7/24 with BPs ranging 130-176/.    Right Knee Pain: Recently, she tripped and fell to her left, but her right knee twisted and snapped. She has been visiting orthopedics and she reports she has a small meniscus tear with some small fractures and bone marrow floating in her knee. She received a brace this afternoon, which she is currently wearing. She would like to use medication to help with the pain, but does not want to interact drugs with her blood pressure medications.     Other concerns discussed:  1. She is traveling soon and will bring her blood pressure cuff.     Review of Systems:   Pertinent items are noted in HPI, remainder of complete ROS is negative.      Active Medications:      amLODIPine (NORVASC) 10 MG tablet, Take 1 tablet (10 mg) by mouth At Bedtime AS DIRECTED, Disp: 90 tablet, Rfl: 3     amLODIPine (NORVASC) 5 MG tablet, Take 1 tablet (5 mg) by mouth At Bedtime, Disp: 90 tablet, Rfl: 3     anastrozole (ARIMIDEX) 1 MG tablet, Take 1 tablet (1 mg) by mouth daily, Disp: 90 tablet, Rfl: 3     atorvastatin (LIPITOR) 40 MG tablet, Take 1 tablet (40 mg) by mouth daily Please put on profile- pt just received 90-day supply of cholesterol meds, Disp: 90 tablet, Rfl: 3     cholecalciferol (VITAMIN D)  1000 UNIT tablet, Take 1 tablet (1,000 Units) by mouth daily, Disp: 100 tablet, Rfl: 3     cholecalciferol (VITAMIN D3) 5000 UNITS CAPS capsule, Take 1 capsule (5,000 Units) by mouth daily, Disp: 120 capsule, Rfl: 3     Coenzyme Q10 (COQ10) 200 MG CAPS, Take 1 capsule by mouth daily, Disp: , Rfl:      diclofenac (VOLTAREN) 1 % GEL topical gel, Apply 4 grams to knees or 2 grams to hands four times daily using enclosed dosing card., Disp: 100 g, Rfl: 1     gabapentin (NEURONTIN) 300 MG capsule, 1-2 tablets EVERY 8 HOURS, Disp: 540 capsule, Rfl: 3     HYDROcodone-acetaminophen (NORCO) 5-325 MG per tablet, Take 1 tablet by mouth every 6 hours as needed, Disp: 40 tablet, Rfl: 0     ketoconazole (NIZORAL) 2 % shampoo, Apply topically daily as needed for itching or irritation, Disp: 120 mL, Rfl: 11     ketorolac (ACULAR) 0.5 % ophthalmic solution, Apply 1 drop to eye, Disp: , Rfl:      levothyroxine (SYNTHROID/LEVOTHROID) 112 MCG tablet, Take 0.5 tab daily, Disp: 45 tablet, Rfl: 3     LORazepam (ATIVAN) 2 MG tablet, Take 1 tablet at night for sleep, Disp: 30 tablet, Rfl: 1     losartan (COZAAR) 25 MG tablet, Take 1 tablet (25 mg) by mouth daily, Disp: 90 tablet, Rfl: 3     metoprolol (TOPROL-XL) 25 MG 24 hr tablet, Take 1 tablet (25 mg) by mouth daily, Disp: 90 tablet, Rfl: 3     Multiple Vitamin (MULTI-VITAMIN) per tablet, Take 1 tablet by mouth daily., Disp: , Rfl:      ofloxacin (OCUFLOX) 0.3 % ophthalmic solution, Apply 1 drop to eye, Disp: , Rfl:      Omega-3 Fatty Acids (OMEGA-3 FISH OIL PO), Take 1 capsule by mouth daily , Disp: , Rfl:      omeprazole (PRILOSEC) 20 MG CR capsule, Take 20 mg by mouth daily, Disp: , Rfl:      ondansetron (ZOFRAN-ODT) 4 MG ODT tab, 1-2 tabs po prn every 6 hours, Disp: 90 tablet, Rfl: 2     prednisoLONE acetate (PRED FORTE) 1 % ophthalmic susp, Place 1 drop into the right eye 3 times daily, Disp: 1 Bottle, Rfl: 0     prednisoLONE acetate (PRED FORTE) 1 % ophthalmic susp, Place 1 drop  Into the left eye 3 times daily, Disp: 1 Bottle, Rfl: 0     terbinafine (LAMISIL AT) 1 % cream, Apply topically 2 times daily For fungal infection not resolved with other antifungals (e.g. Clotrimazole), Disp: 12 g, Rfl: 1     triamcinolone (KENALOG) 0.1 % ointment, Apply topically 2 times daily, Disp: 30 g, Rfl: 1     triamterene-hydrochlorothiazide (MAXZIDE-25) 37.5-25 MG per tablet, Take 1 tablet by mouth daily, Disp: 90 tablet, Rfl: 3     Vaginal Lubricant (REPHRESH) GEL, Place 2 g vaginally every 3 days, Disp: 14 Box, Rfl: 3     VOLTAREN 1 % GEL topical gel, Apply 4 grams to knees or 2 grams to hands four times daily using enclosed dosing card., Disp: 100 g, Rfl: 1      Allergies:   Penicillins      Past Medical History:  Arthritis  Bone disease  Breast cancer  Kyphoplasty  Hearing problem  Kidney stones  Radiation therapy  Hyperlipidemia  Hypertension  Hypopotassemia  Kidney problem  Lymph edema  Medullary sponge kidney  Osteopenia  Reduced vision  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  Hypothyroidism   Nephrolithiasis  Inflamed seborrheic keratosis  Vitamin D deficiency  Dermatitis  Anterior basement membrane dystrophy, bilateral eyes  Corneal opacity  Osteopenia, unspecified location  Aromatase inhibitor use     Past Surgical History:  Ovarian cyst, mesh  Wrist arthrodesis   Vaginal skin biopsy  Cataract IOL, right  External ear surgery, right  Cosmetic surgery  Radial keratotomy  Bone graft from iliac crest  Umbilical hernia repair, multiple  Hysterectomy, total  Mastectomy, bilateral  Parathyroidectomy  Rhinoplasty  Tonsillectomy     Immunizations:  Immunization History   Administered Date(s) Administered     HEPA 04/26/2005, 10/26/2005     HepB 04/26/2005, 06/02/2005, 10/26/2005     Influenza (High Dose) 3 valent vaccine 10/06/2017     Influenza (IIV3) PF 10/01/2008, 09/29/2009, 10/01/2011, 10/02/2012, 10/09/2013, 10/01/2014     Influenza Intranasal Vaccine 4 valent 10/02/2015     Influenza  Vaccine IM 3yrs+ 4 Valent IIV4 10/13/2016     Pneumo Conj 13-V (2010&after) 08/01/2017     Pneumococcal 23 valent 10/01/2008     TD (ADULT, 7+) 01/10/2000, 04/26/2005     TDAP Vaccine (Boostrix) 11/06/2012     Typhoid IM 04/26/2005     Zoster vaccine recombinant adjuvanted (SHINGRIX) 02/15/2018     Zoster vaccine, live 11/14/2012     Family History:   Father - AAA, hypertension  Mother - cerebral aneurysm, dementia, diabetes, thyroid disease, osteoporosis  Maternal grandmother - diabetes, asthma  Maternal grandfather - COPD, asthma  Brother - peripheral neuropathy  Other - diabetes, melanoma, dementia, malignant melanoma, hypertension, cerebrovascular disease, obesity, respiratory      Social History:   The patient is , a nonsmoker and occasionally consumes alcohol.     Physical Exam:   BP (P) 134/85  Pulse (P) 93   Wt Readings from Last 1 Encounters:   07/11/18 97.5 kg (215 lb)     Constitutional: no distress, comfortable, pleasant   Eyes: anicteric.   Cardiovascular: regular rate and rhythm, normal S1 and S2, no murmurs  Respiratory: clear to auscultation, no wheezes or crackles, normal breath sounds   Musculoskeletal: full range of motion, no edema   Skin: no concerning lesions, no jaundice, temp normal   Neurological: normal speech, no tremor. A and O x 3, good historian.  Psychological: appropriate mood, good eye contact, normal affect       Assessment and Plan:  Benign essential hypertension  She will try using lisinopril 20 mg for blood pressure control.   - lisinopril (PRINIVIL/ZESTRIL) 20 MG tablet  Dispense: 30 tablet; Refill: 3.  She is to send in her BP results and notify us of any new side effects.  Stop Losartan.    Right Knee Pain  She can use Aleve for her knee pain. Use one 2x per day. I advised her not to go over 1000 mg a day.      Follow-up: Return p.r.n.      Scribe Disclosure:   TAMI, Sandra Sanchez, am serving as a scribe to document services personally performed by Matilde WILBURN  MERCEDES Quiñonez CNP at this visit, based upon the provider's statements to me. All documentation has been reviewed by the aforementioned provider prior to being entered into the official medical record.     Portions of this medical record were completed by a scribe. UPON MY REVIEW AND AUTHENTICATION BY ELECTRONIC SIGNATURE, this confirms (a) I performed the applicable clinical services, and (b) the record is accurate.   Total time spent 25 minutes.  More than 50% of the time spent with Ms. Perez on counseling / coordinating her care  Matilde LONG CNP

## 2018-08-20 ENCOUNTER — OFFICE VISIT (OUTPATIENT)
Dept: OPHTHALMOLOGY | Facility: CLINIC | Age: 68
End: 2018-08-20
Attending: OPHTHALMOLOGY
Payer: MEDICARE

## 2018-08-20 DIAGNOSIS — Z98.890 HISTORY OF RADIAL KERATOTOMY: Primary | ICD-10-CM

## 2018-08-20 DIAGNOSIS — Z96.1 PSEUDOPHAKIA OF BOTH EYES: ICD-10-CM

## 2018-08-20 DIAGNOSIS — H18.529 ANTERIOR BASEMENT MEMBRANE DYSTROPHY: ICD-10-CM

## 2018-08-20 DIAGNOSIS — Z96.1 PSEUDOPHAKIA OF BOTH EYES: Primary | ICD-10-CM

## 2018-08-20 DIAGNOSIS — H52.212 IRREGULAR ASTIGMATISM OF LEFT EYE: ICD-10-CM

## 2018-08-20 PROCEDURE — G0463 HOSPITAL OUTPT CLINIC VISIT: HCPCS | Mod: ZF

## 2018-08-20 ASSESSMENT — SLIT LAMP EXAM - LIDS
COMMENTS: NORMAL
COMMENTS: NORMAL

## 2018-08-20 ASSESSMENT — VISUAL ACUITY
METHOD: SNELLEN - LINEAR
OD_SC: 20/25
OS_SC: 20/60
OS_PH_SC: 20/25
OD_SC+: -1
OS_SC+: -1

## 2018-08-20 ASSESSMENT — TONOMETRY
IOP_METHOD: ICARE
OD_IOP_MMHG: 19
OS_IOP_MMHG: 16

## 2018-08-20 ASSESSMENT — CONF VISUAL FIELD
OS_NORMAL: 1
OD_NORMAL: 1

## 2018-08-20 ASSESSMENT — EXTERNAL EXAM - LEFT EYE: OS_EXAM: NORMAL

## 2018-08-20 ASSESSMENT — EXTERNAL EXAM - RIGHT EYE: OD_EXAM: NORMAL

## 2018-08-20 NOTE — MR AVS SNAPSHOT
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      Your next 10 appointments already scheduled     Sep 17, 2018  1:30 PM CDT   RETURN CORNEA with Tyson Ruby MD   Eye Clinic ([facility])    [address]  9[address] Clin [address]  [facility] [place] [address]   [phone]            Nov 12, 2018  9:30 AM CST   [facility] Draw with  [facility] DRAW   [facility] Draw ([facility])    [address]  [address]  [facility] [place] [id number] [phone]            Nov 12, 2018 10:00 AM CST   (Arrive by 9:45 AM)   Return Visit with Khushbu Louise MD   [facility] ([facility])    [address]  [address]  [facility] [place] [id number] [phone]            Nov 12, 2018 10:30 AM CST   Infusion 60 with UC ONCOLOGY INFUSION, UC 31 ATC   [facility] ([facility])    [address]  [address]  [facility] [place] [id number] [phone]              Future tests that were ordered for you today     Open Future Orders        Priority Expected Expires Ordered    Corneal Topography OD (right eye) Routine  2/18/2020 8/20/2018            Who to contact     Please call your clinic at [phone] to:    Ask questions about your health    Make or cancel appointments    Discuss your  medicines    Learn about your test results    Speak to your doctor            Additional Information About Your Visit        MyChart Information     [employer] gives you secure access to your electronic health record. If you see a primary care provider, you can also send messages to your care team and make appointments. If you have questions, please call your primary care clinic.  If you do not have a primary care provider, please call [phone] and they will assist you.      [employer] is an electronic gateway that provides easy, online access to your medical records. With [employer], you can request a clinic appointment, read your test results, renew a prescription or communicate with your care team.     To access your existing account, please contact your [facility] or call [phone] for assistance.        Care EveryWhere ID     This is your Care EveryWhere ID. This could be used by other organizations to access your [place] medical records  [id number]         Blood Pressure from Last 3 Encounters:   07/24/18 (P) 134/85   07/11/18 (!) 165/110   06/28/18 153/89    Weight from Last 3 Encounters:   07/11/18 97.5 kg (215 lb)   06/28/18 97.5 kg (215 lb)   06/06/18 97.5 kg (215 lb)              Today, you had the following     No orders found for display       Primary Care Provider Office Phone # Fax #    Matilde BENSON Olamide, APRN [phone]       [address] [place] [address]  [facility] [address]        Equal Access to Services     EDEL LAMA : Hadii jacinta ku hadasho Solingali, waaxda luqadaha, qaybta kaalmada adeegyada, cierra seals . So [facility] [phone].    ATENCIÓN: Si habla español, tiene a davis disposición servicios gratuitos de asistencia lingüística. Llame al [phone].    We comply with applicable federal civil rights laws and [place] laws. We do not discriminate on the basis of race, color, national origin, age, disability, sex, sexual  orientation, or gender identity.            Thank you!     Thank you for choosing EYE CLINIC  for your care. Our goal is always to provide you with excellent care. Hearing back from our patients is one way we can continue to improve our services. Please take a few minutes to complete the written survey that you may receive in the mail after your visit with us. Thank you!             Your Updated Medication List - Protect others around you: Learn how to safely use, store and throw away your medicines at www.disposemymeds.org.          This list is accurate as of 8/20/18  2:28 PM.  Always use your most recent med list.                   Brand Name Dispense Instructions for use Diagnosis    amLODIPine 5 MG tablet    NORVASC    90 tablet    Take 1 tablet (5 mg) by mouth At Bedtime    Essential hypertension, benign       anastrozole 1 MG tablet    ARIMIDEX    90 tablet    Take 1 tablet (1 mg) by mouth daily    Malignant neoplasm of left breast in female, estrogen receptor positive, unspecified site of breast (H)       atorvastatin 40 MG tablet    LIPITOR    90 tablet    Take 1 tablet (40 mg) by mouth daily Please put on profile- pt just received 90-day supply of cholesterol meds    Pure hypercholesterolemia       * cholecalciferol 1000 UNIT tablet    vitamin D3    100 tablet    Take 1 tablet (1,000 Units) by mouth daily    Osteoporosis       * cholecalciferol 5000 units Caps capsule    vitamin D3    120 capsule    Take 1 capsule (5,000 Units) by mouth daily    Vaginal dryness       CoQ10 200 MG Caps      Take 1 capsule by mouth daily        * diclofenac 1 % Gel topical gel    VOLTAREN    100 g    Apply 4 grams to knees or 2 grams to hands four times daily using enclosed dosing card.    Primary osteoarthritis of both wrists       * VOLTAREN 1 % Gel topical gel   Generic drug:  diclofenac     100 g    Apply 4 grams to knees or 2 grams to hands four times daily using enclosed dosing card.    Right knee pain, unspecified  chronicity       gabapentin 300 MG capsule    NEURONTIN    540 capsule    1-2 tablets EVERY 8 HOURS    Neuropathic pain       HYDROcodone-acetaminophen 5-325 MG per tablet    NORCO    40 tablet    Take 1 tablet by mouth every 6 hours as needed    Chronic left-sided low back pain, with sciatica presence unspecified       ketoconazole 2 % shampoo    NIZORAL    120 mL    Apply topically daily as needed for itching or irritation    Dermatitis, seborrheic       levothyroxine 112 MCG tablet    SYNTHROID/LEVOTHROID    45 tablet    Take 0.5 tab daily    Hypothyroidism, unspecified type       lisinopril 20 MG tablet    PRINIVIL/ZESTRIL    30 tablet    Take 1 tablet (20 mg) by mouth daily    Benign essential hypertension       LORazepam 2 MG tablet    ATIVAN    30 tablet    Take 1 tablet at night for sleep    Sleep disorder       metoprolol succinate 25 MG 24 hr tablet    TOPROL-XL    90 tablet    Take 1 tablet (25 mg) by mouth daily    Irregular heart rate       Multi-vitamin Tabs tablet   Generic drug:  multivitamin, therapeutic with minerals      Take 1 tablet by mouth daily.        OMEGA-3 FISH OIL PO      Take 1 capsule by mouth daily        ondansetron 4 MG ODT tab    ZOFRAN-ODT    90 tablet    1-2 tabs po prn every 6 hours    Nausea       REPHRESH Gel     14 Box    Place 2 g vaginally every 3 days    Urinary tract infection, site not specified       terbinafine 1 % cream    lamISIL AT    12 g    Apply topically 2 times daily For fungal infection not resolved with other antifungals (e.g. Clotrimazole)    Tinea pedis of both feet       triamcinolone 0.1 % ointment    KENALOG    30 g    Apply topically 2 times daily    Eczematous dermatitis       triamterene-hydrochlorothiazide 37.5-25 MG per tablet    MAXZIDE-25    90 tablet    Take 1 tablet by mouth daily    Essential hypertension, benign       * Notice:  This list has 4 medication(s) that are the same as other medications prescribed for you. Read the directions  carefully, and ask your doctor or other care provider to review them with you.

## 2018-08-20 NOTE — PROGRESS NOTES
CC: s/p CE/IOL OU     HPI: 66 yo CF s/p CE/IOL OS. Surgery complicated by opening of RK wounds temporally. Resutured. Doing well, denies any pain, feels like vision is improving. No flashes, diplopia. She has noticed more floaters OD     Interval:  Doing well since last visit 2 months. No pain. Using ofloxacin 1-2x/day left eye only. No new flashes/floaters. Vision stable.        POHx:  H/o RK  S/p CE/IOL OS 2/20/18  S/p CE/IOL OD 3/13/18     Gtts:  PF AT PRN  Ofloxacin 2-3x/day OS     Assessment / Plan:     1. S/p CE/IOL both eyes  -chato with more astigmatism right eye, stable left eye  -vision left eye with slightly worsening to 20/60, but pinholes to 20/25  -K chato with temporal steepening, remove corneal sutures today  -start ofloxacin QID x 4 days  -continue PF AT QID OU    RTC 1 month, MRx each eye, K chato OU     Ly Agustin MD  PGY-5, Cornea Fellow  Ophthalmology    Attending Physician Attestation:  Complete documentation of historical and exam elements from today's encounter can be found in the full encounter summary report (not reduplicated in this progress note).  I personally obtained the chief complaint(s) and history of present illness.  I confirmed and edited as necessary the review of systems, past medical/surgical history, family history, social history, and examination findings as documented by others; and I examined the patient myself.  I personally reviewed the relevant tests, images, and reports as documented above.  I formulated and edited as necessary the assessment and plan and discussed the findings and management plan with the patient and family. - Tyson Ruby MD

## 2018-08-20 NOTE — NURSING NOTE
Chief Complaints and History of Present Illnesses   Patient presents with     Follow Up For     History of radial keratotomy - Both Eyes      HPI    Affected eye(s):  Both   Symptoms:        Duration:  2 months   Frequency:  Constant       Do you have eye pain now?:  No      Comments:  Pt. States that VA LE has been fluctuating.  No change in VA RE.  BE have been feeling dry.  Letitia Nagy COT 1:46 PM August 20, 2018

## 2018-08-22 ENCOUNTER — ALLIED HEALTH/NURSE VISIT (OUTPATIENT)
Dept: INTERNAL MEDICINE | Facility: CLINIC | Age: 68
End: 2018-08-22
Payer: MEDICARE

## 2018-08-22 DIAGNOSIS — I49.9 IRREGULAR HEART RATE: ICD-10-CM

## 2018-08-22 DIAGNOSIS — Z23 NEED FOR 23-POLYVALENT PNEUMOCOCCAL POLYSACCHARIDE VACCINE: ICD-10-CM

## 2018-08-22 DIAGNOSIS — M79.2 NEUROPATHIC PAIN: ICD-10-CM

## 2018-08-22 RX ORDER — METOPROLOL SUCCINATE 25 MG/1
25 TABLET, EXTENDED RELEASE ORAL DAILY
Qty: 90 TABLET | Refills: 0 | Status: SHIPPED | OUTPATIENT
Start: 2018-08-22 | End: 2018-10-18

## 2018-08-22 NOTE — NURSING NOTE
Nadira Perez comes into clinic today at the request of Matilde Quiñonez Ordering Provider for Pneumonia Vaccine.        This service provided today was under the supervising provider of the day Dr Emmanuel, who was available if needed.    Anuradha Quintanilla CMA

## 2018-08-22 NOTE — MR AVS SNAPSHOT
After Visit Summary   8/22/2018    Nadira Perez    MRN: [id number]           Patient Information     Date Of Birth          1950        Visit Information        Provider Department      8/22/2018 2:30 PM Nurse, Teodora Pcc [facility]        Today's Diagnoses     Need for 23-polyvalent pneumococcal polysaccharide vaccine           Follow-ups after your visit        Your next 10 appointments already scheduled     Sep 17, 2018  1:30 PM CDT   RETURN CORNEA with Tyson Ruby MD   Eye Clinic ([facility])    [address]  [facility] [address]   [phone]            Nov 12, 2018  9:30 AM CST   [facility] Lab Draw with UC MASONIC LAB DRAW   [facility] Draw ([facility])    [address] [place] [address]  [address]  [facility] [address] [phone]            Nov 12, 2018 10:00 AM CST   (Arrive by 9:45 AM)   Return Visit with Khushbu Louise MD   [facility] ([facility])    [address] [place] [address]  [address]  [facility] [address] [phone]            Nov 12, 2018 10:30 AM CST   Infusion 60 with  ONCOLOGY INFUSION, UC 31 ATC   [facility] ([facility])    [address] [place] [address]  [address]  [facility] [address] [phone]              Who to contact     Please call your clinic at [phone] to:    Ask questions about your health    Make or cancel appointments    Discuss your medicines    Learn about your test results    Speak to your doctor            Additional Information About Your Visit        MyChart Information     kingskyhart gives you secure access to your electronic health record. If you see a primary care provider, you can also send messages to your care team and make appointments. If you have questions, please call your primary care clinic.  If you do not have a primary care  provider, please call [phone] and they will assist you.      Night Zo[employer]eper is an electronic gateway that provides easy, online access to your medical records. With [employer]eper, you can request a clinic appointment, read your test results, renew a prescription or communicate with your care team.     To access your existing account, please contact your [facility] or call [phone] for assistance.        Care EveryWhere ID     This is your Care EveryWhere ID. This could be used by other organizations to access your [facility] medical records  [id number]         Blood Pressure from Last 3 Encounters:   07/24/18 (P) 134/85   07/11/18 (!) 165/110   06/28/18 153/89    Weight from Last 3 Encounters:   07/11/18 97.5 kg (215 lb)   06/28/18 97.5 kg (215 lb)   06/06/18 97.5 kg (215 lb)              We Performed the Following     PNEUMOCOCCAL VACCINE,ADULT,SQ OR IM          Where to get your medicines      These medications were sent to [place] [id number] - [facility] [address] [facility]  [address] [facility] [address]     Phone:  [phone]     metoprolol succinate 25 MG 24 hr tablet          Primary Care Provider Office Phone # Fax #    Matilde BENSON Olamide, MERCEDES -[phone]       [address] [place] [address] [address] [place] [address]        Equal Access to Services     EDEL LAMA : Hadii jacinta sousao Sosukhdev, waaxda luqadaha, qaybta kaalmada cierra ambriz . So [facility] [phone].    ATENCIÓN: Si habla español, tiene a davis disposición servicios gratuitos de asistencia lingüística. Garrick al [phone].    We comply with applicable federal civil rights laws and [place] laws. We do not discriminate on the basis of race, color, national origin, age, disability, sex, sexual orientation, or gender identity.            Thank you!     Thank you for choosing [facility]  for your care. Our goal is always to provide  you with excellent care. Hearing back from our patients is one way we can continue to improve our services. Please take a few minutes to complete the written survey that you may receive in the mail after your visit with us. Thank you!             Your Updated Medication List - Protect others around you: Learn how to safely use, store and throw away your medicines at www.disposemymeds.org.          This list is accurate as of 8/22/18  2:45 PM.  Always use your most recent med list.                   Brand Name Dispense Instructions for use Diagnosis    amLODIPine 5 MG tablet    NORVASC    90 tablet    Take 1 tablet (5 mg) by mouth At Bedtime    Essential hypertension, benign       anastrozole 1 MG tablet    ARIMIDEX    90 tablet    Take 1 tablet (1 mg) by mouth daily    Malignant neoplasm of left breast in female, estrogen receptor positive, unspecified site of breast (H)       atorvastatin 40 MG tablet    LIPITOR    90 tablet    Take 1 tablet (40 mg) by mouth daily Please put on profile- pt just received 90-day supply of cholesterol meds    Pure hypercholesterolemia       * cholecalciferol 1000 UNIT tablet    vitamin D3    100 tablet    Take 1 tablet (1,000 Units) by mouth daily    Osteoporosis       * cholecalciferol 5000 units Caps capsule    vitamin D3    120 capsule    Take 1 capsule (5,000 Units) by mouth daily    Vaginal dryness       CoQ10 200 MG Caps      Take 1 capsule by mouth daily        * diclofenac 1 % Gel topical gel    VOLTAREN    100 g    Apply 4 grams to knees or 2 grams to hands four times daily using enclosed dosing card.    Primary osteoarthritis of both wrists       * VOLTAREN 1 % Gel topical gel   Generic drug:  diclofenac     100 g    Apply 4 grams to knees or 2 grams to hands four times daily using enclosed dosing card.    Right knee pain, unspecified chronicity       gabapentin 300 MG capsule    NEURONTIN    540 capsule    1-2 tablets EVERY 8 HOURS    Neuropathic pain        HYDROcodone-acetaminophen 5-325 MG per tablet    NORCO    40 tablet    Take 1 tablet by mouth every 6 hours as needed    Chronic left-sided low back pain, with sciatica presence unspecified       ketoconazole 2 % shampoo    NIZORAL    120 mL    Apply topically daily as needed for itching or irritation    Dermatitis, seborrheic       levothyroxine 112 MCG tablet    SYNTHROID/LEVOTHROID    45 tablet    Take 0.5 tab daily    Hypothyroidism, unspecified type       lisinopril 20 MG tablet    PRINIVIL/ZESTRIL    30 tablet    Take 1 tablet (20 mg) by mouth daily    Benign essential hypertension       LORazepam 2 MG tablet    ATIVAN    30 tablet    Take 1 tablet at night for sleep    Sleep disorder       metoprolol succinate 25 MG 24 hr tablet    TOPROL-XL    90 tablet    Take 1 tablet (25 mg) by mouth daily    Irregular heart rate       Multi-vitamin Tabs tablet   Generic drug:  multivitamin, therapeutic with minerals      Take 1 tablet by mouth daily.        OMEGA-3 FISH OIL PO      Take 1 capsule by mouth daily        ondansetron 4 MG ODT tab    ZOFRAN-ODT    90 tablet    1-2 tabs po prn every 6 hours    Nausea       REPHRESH Gel     14 Box    Place 2 g vaginally every 3 days    Urinary tract infection, site not specified       terbinafine 1 % cream    lamISIL AT    12 g    Apply topically 2 times daily For fungal infection not resolved with other antifungals (e.g. Clotrimazole)    Tinea pedis of both feet       triamcinolone 0.1 % ointment    KENALOG    30 g    Apply topically 2 times daily    Eczematous dermatitis       triamterene-hydrochlorothiazide 37.5-25 MG per tablet    MAXZIDE-25    90 tablet    Take 1 tablet by mouth daily    Essential hypertension, benign       * Notice:  This list has 4 medication(s) that are the same as other medications prescribed for you. Read the directions carefully, and ask your doctor or other care provider to review them with you.

## 2018-08-22 NOTE — TELEPHONE ENCOUNTER
Gabapentin    Last Written Prescription Date:  8-22-17  Last Fill Quantity: 540,   # refills: 3  Last Office Visit : 7-24-18  Future Office visit:  none    Routing refill request to RN for review/approval because:  Drug not on the [facility], [facility] or [facility] refill protocol.      Metoprolol      Last Written Prescription Date:  8-22-17  Last Fill Quantity: 90,   # refills: 3  Last Office Visit : 7-24-18  Future Office visit:  none    Routing RN because:BP,  3 month refill given.  BP Readings from Last 3 Encounters:   07/24/18 (P) 134/85   07/11/18 (!) 165/110   06/28/18 153/89

## 2018-08-23 RX ORDER — GABAPENTIN 300 MG/1
CAPSULE ORAL
Qty: 540 CAPSULE | Refills: 3 | Status: SHIPPED | OUTPATIENT
Start: 2018-08-23 | End: 2018-10-18

## 2018-09-17 ENCOUNTER — OFFICE VISIT (OUTPATIENT)
Dept: OPHTHALMOLOGY | Facility: CLINIC | Age: 68
End: 2018-09-17
Attending: OPHTHALMOLOGY
Payer: MEDICARE

## 2018-09-17 DIAGNOSIS — H17.9 CORNEAL SCARS, BOTH EYES: Primary | ICD-10-CM

## 2018-09-17 DIAGNOSIS — H18.529 ANTERIOR BASEMENT MEMBRANE DYSTROPHY: ICD-10-CM

## 2018-09-17 DIAGNOSIS — Z96.1 PSEUDOPHAKIA: ICD-10-CM

## 2018-09-17 DIAGNOSIS — H17.9 CORNEAL SCARS, BOTH EYES: ICD-10-CM

## 2018-09-17 DIAGNOSIS — Z98.890 HISTORY OF RADIAL KERATOTOMY: Primary | ICD-10-CM

## 2018-09-17 DIAGNOSIS — Z96.1 PSEUDOPHAKIA OF BOTH EYES: ICD-10-CM

## 2018-09-17 PROCEDURE — G0463 HOSPITAL OUTPT CLINIC VISIT: HCPCS | Mod: ZF

## 2018-09-17 PROCEDURE — 92025 CPTRIZED CORNEAL TOPOGRAPHY: CPT | Mod: ZF | Performed by: OPHTHALMOLOGY

## 2018-09-17 ASSESSMENT — SLIT LAMP EXAM - LIDS
COMMENTS: NORMAL
COMMENTS: NORMAL

## 2018-09-17 ASSESSMENT — VISUAL ACUITY
METHOD: SNELLEN - LINEAR
OS_SC: 20/40
OD_SC: 20/25
OD_SC+: -1
OS_PH_SC: 20/30
OS_SC+: -1

## 2018-09-17 ASSESSMENT — EXTERNAL EXAM - RIGHT EYE: OD_EXAM: NORMAL

## 2018-09-17 ASSESSMENT — TONOMETRY
OS_IOP_MMHG: 16
OD_IOP_MMHG: 19
IOP_METHOD: TONOPEN

## 2018-09-17 ASSESSMENT — CONF VISUAL FIELD
OD_NORMAL: 1
OS_NORMAL: 1

## 2018-09-17 ASSESSMENT — EXTERNAL EXAM - LEFT EYE: OS_EXAM: NORMAL

## 2018-09-17 NOTE — MR AVS SNAPSHOT
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      Your next 10 appointments already scheduled     Nov 12, 2018  9:30 AM CST   [facility] Lab Draw with  MASONIC LAB DRAW   [facility] ([facility])    [address] [place] [address]  [address] [place] [address] [phone]            Nov 12, 2018 10:00 AM CST   (Arrive by 9:45 AM)   Return Visit with Khushbu Louise MD   [facility] ([facility])    [address] [place] [address]  [address] [place] [address] [phone]            Nov 12, 2018 10:30 AM CST   Infusion 60 with UC ONCOLOGY INFUSION, UC 31 ATC   [facility] ([facility])    [address] [place] [address]  [address] [place] [address] [phone]            Dec 21, 2018 12:15 PM CST   RETURN CORNEA with Tyson Ruby MD   Eye Clinic ([facility])    [address]  9th [address] Clin 9a  [facility] [address] [phone]              Who to contact     Please call your clinic at [phone] to:    Ask questions about your health    Make or cancel appointments    Discuss your medicines    Learn about your test results    Speak to your doctor            Additional Information About Your Visit        MyChart Information     [facility] gives you secure access  to your electronic health record. If you see a primary care provider, you can also send messages to your care team and make appointments. If you have questions, please call your primary care clinic.  If you do not have a primary care provider, please call [phone] and they will assist you.      Lagoon is an electronic gateway that provides easy, online access to your medical records. With [employer], you can request a clinic appointment, read your test results, renew a prescription or communicate with your care team.     To access your existing account, please contact your [facility] or call [phone] for assistance.        Care EveryWhere ID     This is your Care EveryWhere ID. This could be used by other organizations to access your [facility] medical records  [id number]         Blood Pressure from Last 3 Encounters:   07/24/18 (P) 134/85   07/11/18 (!) 165/110   06/28/18 153/89    Weight from Last 3 Encounters:   07/11/18 97.5 kg (215 lb)   06/28/18 97.5 kg (215 lb)   06/06/18 97.5 kg (215 lb)              We Performed the Following     Corneal Topography OU (both eyes)        Primary Care Provider Office Phone # Fax #    Matilde BENSON Olamide, APRN -[phone]       [address] [place] [address]  [facility] [address]        Equal Access to Services     EDEL LAMA : Hadii aad ku hadasho Soomaali, waaxda luqadaha, qaybta kaalmada adeegyada, cierra daugherty. So [facility] [phone].    ATENCIÓN: Si habla español, tiene a davis disposición servicios gratuitos de asistencia lingüística. Llame al [phone].    We comply with applicable federal civil rights laws and [place] laws. We do not discriminate on the basis of race, color, national origin, age, disability, sex, sexual orientation, or gender identity.            Thank you!     Thank you for choosing EYE CLINIC  for your care. Our goal is always to provide you with excellent care. Hearing back  from our patients is one way we can continue to improve our services. Please take a few minutes to complete the written survey that you may receive in the mail after your visit with us. Thank you!             Your Updated Medication List - Protect others around you: Learn how to safely use, store and throw away your medicines at www.disposemymeds.org.          This list is accurate as of 9/17/18  3:59 PM.  Always use your most recent med list.                   Brand Name Dispense Instructions for use Diagnosis    amLODIPine 5 MG tablet    NORVASC    90 tablet    Take 1 tablet (5 mg) by mouth At Bedtime    Essential hypertension, benign       anastrozole 1 MG tablet    ARIMIDEX    90 tablet    Take 1 tablet (1 mg) by mouth daily    Malignant neoplasm of left breast in female, estrogen receptor positive, unspecified site of breast (H)       atorvastatin 40 MG tablet    LIPITOR    90 tablet    Take 1 tablet (40 mg) by mouth daily Please put on profile- pt just received 90-day supply of cholesterol meds    Pure hypercholesterolemia       * cholecalciferol 1000 UNIT tablet    vitamin D3    100 tablet    Take 1 tablet (1,000 Units) by mouth daily    Osteoporosis       * cholecalciferol 5000 units Caps capsule    vitamin D3    120 capsule    Take 1 capsule (5,000 Units) by mouth daily    Vaginal dryness       CoQ10 200 MG Caps      Take 1 capsule by mouth daily        * diclofenac 1 % Gel topical gel    VOLTAREN    100 g    Apply 4 grams to knees or 2 grams to hands four times daily using enclosed dosing card.    Primary osteoarthritis of both wrists       * VOLTAREN 1 % Gel topical gel   Generic drug:  diclofenac     100 g    Apply 4 grams to knees or 2 grams to hands four times daily using enclosed dosing card.    Right knee pain, unspecified chronicity       gabapentin 300 MG capsule    NEURONTIN    540 capsule    Take 1-2 capsules by mouth every 8 hours    Neuropathic pain       HYDROcodone-acetaminophen 5-325 MG per  tablet    NORCO    40 tablet    Take 1 tablet by mouth every 6 hours as needed    Chronic left-sided low back pain, with sciatica presence unspecified       ketoconazole 2 % shampoo    NIZORAL    120 mL    Apply topically daily as needed for itching or irritation    Dermatitis, seborrheic       levothyroxine 112 MCG tablet    SYNTHROID/LEVOTHROID    45 tablet    Take 0.5 tab daily    Hypothyroidism, unspecified type       lisinopril 20 MG tablet    PRINIVIL/ZESTRIL    30 tablet    Take 1 tablet (20 mg) by mouth daily    Benign essential hypertension       LORazepam 2 MG tablet    ATIVAN    30 tablet    Take 1 tablet at night for sleep    Sleep disorder       metoprolol succinate 25 MG 24 hr tablet    TOPROL-XL    90 tablet    Take 1 tablet (25 mg) by mouth daily    Irregular heart rate       Multi-vitamin Tabs tablet   Generic drug:  multivitamin, therapeutic with minerals      Take 1 tablet by mouth daily.        OMEGA-3 FISH OIL PO      Take 1 capsule by mouth daily        ondansetron 4 MG ODT tab    ZOFRAN-ODT    90 tablet    1-2 tabs po prn every 6 hours    Nausea       REPHRESH Gel     14 Box    Place 2 g vaginally every 3 days    Urinary tract infection, site not specified       terbinafine 1 % cream    lamISIL AT    12 g    Apply topically 2 times daily For fungal infection not resolved with other antifungals (e.g. Clotrimazole)    Tinea pedis of both feet       triamcinolone 0.1 % ointment    KENALOG    30 g    Apply topically 2 times daily    Eczematous dermatitis       triamterene-hydrochlorothiazide 37.5-25 MG per tablet    MAXZIDE-25    90 tablet    Take 1 tablet by mouth daily    Essential hypertension, benign       * Notice:  This list has 4 medication(s) that are the same as other medications prescribed for you. Read the directions carefully, and ask your doctor or other care provider to review them with you.

## 2018-09-17 NOTE — NURSING NOTE
Chief Complaints and History of Present Illnesses   Patient presents with     Follow Up For     1 month follow up      HPI    Affected eye(s):  Both   Symptoms:     Floaters   Dryness   No itching   Burning   No photophobia         Do you have eye pain now?:  No      Comments:  1 month follow up   Still see line in vision when bright light LEFani Steward COA 2:35 PM September 17, 2018

## 2018-09-17 NOTE — PROGRESS NOTES
CC: s/p CE/IOL OU      HPI: 68 yo CF s/p CE/IOL OS. Surgery complicated by opening of RK wounds temporally. Resutured. Doing well, denies any pain, feels like vision is improving. No flashes, diplopia. She has noticed more floaters OD      Interval:  Doing well since last visit - sutures removed left eye. Used ofloxacin x 4 days, then stopped. No pain or discomfort. Vision still slightly off each eye. Using reading glasses OTC PRN.      POHx:  H/o RK  S/p CE/IOL OS 2/20/18  S/p CE/IOL OD 3/13/18      Gtts:  PF AT PRN  -did 4 days of ofloxacin left eye after suture removal last visit      Assessment / Plan:      1. S/p CE/IOL both eyes  2. ABMD, each eye   -s/p suture removal left eye last visit  -doing well today, using tears PRN only  -refracts to 20/25 right eye, 20/30 left eye (this is where the vision was before the cataract surgery)  -chato today shows slightly more astigmastism left eye than last visit despite suture removal  -has central ABMD changes each eye, but surface looks good with no PEE or infiltrates    F/u 3-6 months, DFE OD - possible YAG       Ly Agustin MD  PGY-5, [facility]nea Fellow  Ophthalmology    Attending Physician Attestation:  Complete documentation of historical and exam elements from today's encounter can be found in the full encounter summary report (not reduplicated in this progress note).  I personally obtained the chief complaint(s) and history of present illness.  I confirmed and edited as necessary the review of systems, past medical/surgical history, family history, social history, and examination findings as documented by others; and I examined the patient myself.  I personally reviewed the relevant tests, images, and reports as documented above.  I formulated and edited as necessary the assessment and plan and discussed the findings and management plan with the patient and family. I personally reviewed the ophthalmic test(s) associated with this encounter, agree with the  interpretation(s) as documented by the resident/fellow, and have edited the corresponding report(s) as necessary. - Tyson Ruby MD

## 2018-10-03 ENCOUNTER — TELEPHONE (OUTPATIENT)
Dept: AUDIOLOGY | Facility: CLINIC | Age: 68
End: 2018-10-03

## 2018-10-03 NOTE — TELEPHONE ENCOUNTER
Left VM stating that the replacement left device is in office and ready for pick-up at her convenience.  Reminded patient of the $250 fee which she will need to pay when she picks up the device.  Also stated that she is welcome to schedule an appointment to  the device if she would prefer that- she is welcome to call the clinic number to schedule [phone]    Rik Arauz  Audiologist  [place] License  #[id number]

## 2018-10-04 ENCOUNTER — OFFICE VISIT (OUTPATIENT)
Dept: AUDIOLOGY | Facility: CLINIC | Age: 68
End: 2018-10-04

## 2018-10-04 DIAGNOSIS — H90.3 SENSORY HEARING LOSS, BILATERAL: Primary | ICD-10-CM

## 2018-10-16 ENCOUNTER — TELEPHONE (OUTPATIENT)
Dept: AUDIOLOGY | Facility: CLINIC | Age: 68
End: 2018-10-16

## 2018-10-16 ENCOUNTER — OFFICE VISIT (OUTPATIENT)
Dept: PHARMACY | Facility: CLINIC | Age: 68
End: 2018-10-16
Payer: COMMERCIAL

## 2018-10-16 ENCOUNTER — OFFICE VISIT (OUTPATIENT)
Dept: DERMATOLOGY | Facility: CLINIC | Age: 68
End: 2018-10-16
Payer: MEDICARE

## 2018-10-16 VITALS
WEIGHT: 196.7 LBS | SYSTOLIC BLOOD PRESSURE: 137 MMHG | BODY MASS INDEX: 30.81 KG/M2 | HEART RATE: 74 BPM | DIASTOLIC BLOOD PRESSURE: 88 MMHG

## 2018-10-16 DIAGNOSIS — M79.2 NEUROPATHIC PAIN: ICD-10-CM

## 2018-10-16 DIAGNOSIS — B35.3 TINEA PEDIS OF BOTH FEET: ICD-10-CM

## 2018-10-16 DIAGNOSIS — M25.50 MULTIPLE JOINT PAIN: ICD-10-CM

## 2018-10-16 DIAGNOSIS — Z85.3 HISTORY OF BREAST CANCER IN FEMALE: ICD-10-CM

## 2018-10-16 DIAGNOSIS — L98.9 SYMPTOMATIC SKIN LESION: ICD-10-CM

## 2018-10-16 DIAGNOSIS — I10 ESSENTIAL HYPERTENSION, BENIGN: Primary | ICD-10-CM

## 2018-10-16 DIAGNOSIS — Z85.89 HISTORY OF SQUAMOUS CELL CARCINOMA: ICD-10-CM

## 2018-10-16 DIAGNOSIS — L21.9 DERMATITIS, SEBORRHEIC: Primary | ICD-10-CM

## 2018-10-16 DIAGNOSIS — L30.9 ECZEMA, UNSPECIFIED TYPE: ICD-10-CM

## 2018-10-16 PROCEDURE — 99607 MTMS BY PHARM ADDL 15 MIN: CPT | Performed by: PHARMACIST

## 2018-10-16 PROCEDURE — 99605 MTMS BY PHARM NP 15 MIN: CPT | Performed by: PHARMACIST

## 2018-10-16 RX ORDER — TRIAMCINOLONE ACETONIDE 1 MG/G
OINTMENT TOPICAL 2 TIMES DAILY
Qty: 30 G | Refills: 11 | Status: SHIPPED | OUTPATIENT
Start: 2018-10-16 | End: 2019-10-22

## 2018-10-16 RX ORDER — KETOCONAZOLE 20 MG/ML
SHAMPOO TOPICAL DAILY PRN
Qty: 120 ML | Refills: 11 | Status: SHIPPED | OUTPATIENT
Start: 2018-10-16 | End: 2019-10-22

## 2018-10-16 RX ORDER — SODIUM PHOSPHATE,MONO-DIBASIC 19G-7G/118
1 ENEMA (ML) RECTAL 2 TIMES DAILY
COMMUNITY
End: 2021-05-04

## 2018-10-16 RX ORDER — PRENATAL VIT 91/IRON/FOLIC/DHA 28-975-200
COMBINATION PACKAGE (EA) ORAL 2 TIMES DAILY
Qty: 24 G | Refills: 11 | Status: SHIPPED | OUTPATIENT
Start: 2018-10-16 | End: 2019-10-22

## 2018-10-16 ASSESSMENT — PAIN SCALES - GENERAL
PAINLEVEL: NO PAIN (0)
PAINLEVEL: MILD PAIN (2)

## 2018-10-16 NOTE — TELEPHONE ENCOUNTER
Nadira Perez was seen at the [facility] on 10/16/18 for hearing aid services.  She reported she was unable to pair both of her hearing aids to her iPhone.  Her left hearing aid had recently been replaced via loss & damage warranty and was programmed without the right device present.  Both hearing aids were connected to the fitting software and programmed with the existing settings.  Afterwards, both hearing aids were successfully re-paired to her iPhone/[employer] elena.  Nadira will return to the clinic as needed.

## 2018-10-16 NOTE — MR AVS SNAPSHOT
After Visit Summary   10/16/2018    Nadira Perez    MRN: [id number]           Patient Information     Date Of Birth          1950        Visit Information        Provider Department      10/16/2018 11:00 AM Kisha Heller [facility] Medication Therapy Management        Today's Diagnoses     Essential hypertension, benign    -  1    Multiple joint pain        Neuropathic pain        History of breast cancer in female          Care Instructions    Recommendations from today's MTM visit:                                                        1. Let's increase amlodipine to 10 mg daily to see if that helps bring blood pressure closer to 130/80 mmHg.  I hope your visit with Matilde went well!  I'll be in touch in early November to schedule a blood pressure follow-up.     2. Continue working to reduce your use of ibuprofen.  Long-term use can increase risk for heart events and can harm your kidneys.  I hope we can work together to come up with a better plan for your pain!    Next MTM visit:  I'll be in touch via XMLAW in early November to schedule a blood pressure follow-up.     To schedule another MTM appointment, please call the clinic directly or you may call the MTM scheduling line at [phone] or toll-free at [phone].     My Clinical Pharmacist's contact information:                                                      It was a pleasure seeing you today!  Please feel free to contact me with any questions or concerns you have.      Kisha Heller, Pharm.D., [facility]  Medication Therapy Management Pharmacist  Page/VM:  [phone]    You may receive a survey about the MTM services you received.  I would appreciate your feedback to help me serve you better in the future. Please fill it out and return it when you can. Your comments will be anonymous.                      Follow-ups after your visit        Your next 10 appointments already scheduled     Nov 13, 2018  9:30 AM  CST   [facility] Draw with [facility]   [facility] ([facility])    [address]  [address]  [place] [address] [phone]            Nov 13, 2018 10:10 AM CST   (Arrive by 9:55 AM)   Return Visit with Sari Ireland PA-C   [facility] ([facility])    [address]  [address]  [place] [id number] [phone]            Nov 13, 2018 11:00 AM CST   Infusion 60 with UC ONCOLOGY INFUSION, UC 13 ATC   [facility] ([facility])    [address]  [address]  [place] [address] [phone]            Dec 21, 2018 12:15 PM CST   RETURN CORNEA with Tyson Ruby MD   [facility] ([facility])    [address]  [place] [id number] [phone]              Who to contact     If you have questions or need follow up information about today's clinic visit or your schedule please contact [facility] MEDICATION THERAPY MANAGEMENT directly at [phone].  Normal or non-critical lab and imaging results will be communicated to you by [employer], letter or phone within 4 business days after the clinic has received the results. If you do not hear from us within 7 days, please contact the clinic through [employer] or phone. If you have a critical or abnormal lab result, we will notify you by phone as soon as possible.  Submit refill requests through [employer] or call your pharmacy and they will forward the refill request to us. Please allow 3 business days for your refill to be completed.          Additional Information About Your Visit        [employer] Information     [employer] gives you secure access to your electronic health record. If you see a primary care provider, you can also send messages to your care team and make appointments. If you have questions, please call your primary care  clinic.  If you do not have a primary care provider, please call [phone] and they will assist you.        Care EveryWhere ID     This is your Care EveryWhere ID. This could be used by other organizations to access your [facility] medical records  [id number]        Your Vitals Were     Pulse BMI (Body Mass Index)                74 30.81 kg/m2           Blood Pressure from Last 3 Encounters:   10/18/18 142/88   10/16/18 137/88   07/24/18 (P) 134/85    Weight from Last 3 Encounters:   10/16/18 196 lb 11.2 oz (89.2 kg)   07/11/18 215 lb (97.5 kg)   06/28/18 215 lb (97.5 kg)              Today, you had the following     No orders found for display         Where to get your medicines      These medications were sent to [place] [id number] [facility] [address]  [address]   [address]  [address] , [facility] [address]     Phone:  [phone]     ketoconazole 2 % shampoo    terbinafine 1 % cream    triamcinolone 0.1 % ointment          Primary Care Provider Office Phone # Fax #    Matilde Quiñonez, APRN [phone]       [address] [place] [address]  [facility] [address]        Equal Access to Services     EDEL LAMA AH: Hadii aad ku hadasho Soomaali, waaxda luqadaha, qaybta kaalmada adeegyada, waxay idiin haywilfredon seven daugherty. So [facility] [phone].    ATENCIÓN: Si habla español, tiene a davis disposición servicios gratuitos de asistencia lingüística. Llmaryanne al [phone].    We comply with applicable federal civil rights laws and [place] laws. We do not discriminate on the basis of race, color, national origin, age, disability, sex, sexual orientation, or gender identity.            Thank you!     Thank you for choosing [facility] MEDICATION THERAPY MANAGEMENT  for your care. Our goal is always to provide you with excellent care. Hearing back from our patients is one way we can continue to improve our services. Please take a few minutes to complete the written survey that you may receive in  the mail after your visit with us. Thank you!             Your Updated Medication List - Protect others around you: Learn how to safely use, store and throw away your medicines at www.disposemymeds.org.          This list is accurate as of 10/16/18 11:59 PM.  Always use your most recent med list.                   Brand Name Dispense Instructions for use Diagnosis    anastrozole 1 MG tablet    ARIMIDEX    90 tablet    Take 1 tablet (1 mg) by mouth daily    Malignant neoplasm of left breast in female, estrogen receptor positive, unspecified site of breast (H)       APAP 500 PO      Take 1,000 mg by mouth 2 times daily        * cholecalciferol 1000 UNIT tablet    vitamin D3    100 tablet    Take 1 tablet (1,000 Units) by mouth daily    Osteoporosis       * cholecalciferol 5000 units Caps capsule    vitamin D3    120 capsule    Take 1 capsule (5,000 Units) by mouth daily    Vaginal dryness       CoQ10 200 MG Caps      Take 1 capsule by mouth daily        * diclofenac 1 % Gel topical gel    VOLTAREN    100 g    Apply 4 grams to knees or 2 grams to hands four times daily using enclosed dosing card.    Primary osteoarthritis of both wrists       * VOLTAREN 1 % Gel topical gel   Generic drug:  diclofenac     100 g    Apply 4 grams to knees or 2 grams to hands four times daily using enclosed dosing card.    Right knee pain, unspecified chronicity       glucosamine-chondroitin 500-400 MG Caps per capsule      Take 2 capsules by mouth 2 times daily        ketoconazole 2 % shampoo    NIZORAL    120 mL    Apply topically daily as needed for itching or irritation    Dermatitis, seborrheic       Multi-vitamin Tabs tablet   Generic drug:  multivitamin, therapeutic with minerals      Take 1 tablet by mouth daily.        OMEGA-3 FISH OIL PO      Take 1 capsule by mouth daily        ondansetron 4 MG ODT tab    ZOFRAN-ODT    90 tablet    1-2 tabs po prn every 6 hours    Nausea       REPHRESH Gel     14 Box    Place 2 g vaginally every  3 days    Urinary tract infection, site not specified       terbinafine 1 % cream    lamISIL AT    24 g    Apply topically 2 times daily For fungal infection not resolved with other antifungals (e.g. Clotrimazole)    Tinea pedis of both feet       triamcinolone 0.1 % ointment    KENALOG    30 g    Apply topically 2 times daily    Eczema, unspecified type       * Notice:  This list has 4 medication(s) that are the same as other medications prescribed for you. Read the directions carefully, and ask your doctor or other care provider to review them with you.

## 2018-10-16 NOTE — NURSING NOTE
Dermatology Rooming Note    Nadira Perez's goals for this visit include:   Chief Complaint   Patient presents with     Derm Problem     Ismael is here for a skin check.      Mae Barraza LPN

## 2018-10-16 NOTE — PROGRESS NOTES
[facility] Dermatology Note    Dermatology Problem List:  1. History of SCC of the perineum, tx w/ excision and radiation  - Last TBSE 10/16/18   2. SK's. Multiple over chest back and face, and legs  3. Tinea pedis w/ onychomycosis   - Terbinafine cream  4. Seborrheic dermatitis   - 2% ketoconazole shampoo, alternate with Head and Shoulders  4. Hand dermatitis: Triamcinolone 0.1% cream PRN    Encounter Date: Oct 16, 2018    CC:  Chief Complaint   Patient presents with     Derm Problem     Ismael is here for a skin check.      History of Present Illness:  Ms. Nadira Perez is a 68 year old female with a history of SCC of the perineum many years ago, treated w/ excision and radiation who presents as a follow-up for annual skin exam. The patient was last seen on 3/24/17 at which time she had no evidence of recurrence.     Patient endorses several areas of concern. She has several lesions on the panty line (inguinal folds) bilaterally and two lesions on the abdomen. They itch and are painful to touch. They bother her regularly, especially those on the abdomen and in the inguinal fold. She is interested in hearing about treatment options.      To review her history of risk, she has stated she had a large amount of sun exposure when she was younger, especially to the face and shoulders. Now she wears sunscreen all the time and avoids the sun in general. She has no personal history of melanoma, but does have a family history of it in her aunt. She has used tanning beds <10 times in her lifetime.     Past Medical History:   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     Hypopotassemia     Hyperlipidemia     Murmurs     Nephrolithiasis     Osteoarthrosis, hand     Personal history of other malignant neoplasm of skin     Inflamed seborrheic keratosis     Essential hypertension, benign     Osteoporosis     Mechanical problems  with limbs     Hypovitaminosis D     Contact dermatitis and other eczema, due to unspecified cause     Dermatitis     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     Corneal opacity     Hypothyroidism     Osteopenia, unspecified location     Aromatase inhibitor use     Past Medical History:   Diagnosis Date     Arthritis      Bone disease      Breast cancer (H)      H/O kyphoplasty      Hearing problem      History of kidney stones      History of radiation therapy      Hyperlipemia      Hypertension      Hypopotassemia      Kidney problem      Lymph edema      Medullary sponge kidney      Osteopenia      PONV (postoperative nausea and vomiting)      Reduced vision      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     Thyroid disease      Past Surgical History:   Procedure Laterality Date     ABDOMEN SURGERY      ovarian cyst, mesh     ARTHRODESIS WRIST       ARTHRODESIS WRIST  2/14/2013    Procedure: ARTHRODESIS WRIST;  left wrist scaphoid excision, four bone fusion, iliac crest bone graft  ( Mac with block);  Surgeon: Av Mendez MD;  [facility]: [facility]     BIOPSY      skin, vaginal     BIOPSY OF SKIN LESION       CATARACT IOL, RT/LT Right 03/13/2018     CATARACT IOL, RT/LT Left 02/20/2018     COLONOSCOPY  12/24/2013    Procedure: COMBINED COLONOSCOPY, SINGLE BIOPSY/POLYPECTOMY BY BIOPSY;  COLONOSCOPY;  Surgeon: Dom Alvarez MD;  Location:  GI     COSMETIC SURGERY       ESOPHAGOSCOPY, GASTROSCOPY, DUODENOSCOPY (EGD), COMBINED N/A 11/23/2016    Procedure: COMBINED ESOPHAGOSCOPY, GASTROSCOPY, DUODENOSCOPY (EGD);  Surgeon: Quinten Feliciano MD;  Location:  GI     EXTERNAL EAR SURGERY      right     EYE SURGERY      radial keratomy     GRAFT BONE FROM ILIAC CREST  2/14/2013    Procedure: GRAFT BONE FROM ILIAC CREST;  mac with block and local infilitration;  Surgeon: Av Mendez MD;  Location:  OR      BREATH HYDROGEN TEST N/A 10/14/2016    Procedure: HYDROGEN BREATH TEST;   Surgeon: Cheri Barron MD;  [facility]     HERNIA REPAIR      UMBILICAL     HERNIA REPAIR      umbilical age 18 mos.     HERNIA REPAIR       HERNIORRHAPHY UMBILICAL  [birth year]31/1954     HYSTERECTOMY TOTAL ABDOMINAL  5/3/00     HYSTERECTOMY TOTAL ABDOMINAL  5/3/2000     MASTECTOMY      PROPHYLACTIC RIGHT BREAST     MASTECTOMY MODIFIED RADICAL      LEFT BREAST     MASTECTOMY MODIFIED RADICAL      bilateral; right breast prophylactic     PARATHYROIDECTOMY  9/23/04    R SUPERIOR     PARATHYROIDECTOMY  9/23/2004    parathyroid resection, subtotal     REMOVE HARDWARE HAND  9/24/2013    Procedure: REMOVE HARDWARE HAND;  Left Hand Screw Removal        RHINOPLASTY  12/31/1985     RHINOPLASTY  12/31/1985     thyr proc skin closed cosmetic manner by subcuticular stitch  1/23/2009     TONSILLECTOMY  3/1/68     TONSILLECTOMY  3/1/1968     WRIST SURGERY      wrist arthrodesis     Social History:  The patient works as a nurse, 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 The patient used to use tanning beds, est. 10 times in her lifetime    Family History:  There is a family history of melanoma in the patient's aunt.    Medications:  Current Outpatient Prescriptions   Medication Sig Dispense Refill     amLODIPine (NORVASC) 5 MG tablet Take 1 tablet (5 mg) by mouth At Bedtime 90 tablet 3     anastrozole (ARIMIDEX) 1 MG tablet Take 1 tablet (1 mg) by mouth daily 90 tablet 3     atorvastatin (LIPITOR) 40 MG tablet Take 1 tablet (40 mg) by mouth daily Please put on profile- pt just received 90-day supply of cholesterol meds 90 tablet 3     cholecalciferol (VITAMIN D) 1000 UNIT tablet Take 1 tablet (1,000 Units) by mouth daily 100 tablet 3     cholecalciferol (VITAMIN D3) 5000 UNITS CAPS capsule Take 1 capsule (5,000 Units) by mouth daily 120 capsule 3     Coenzyme Q10 (COQ10) 200 MG CAPS Take 1  capsule by mouth daily       diclofenac (VOLTAREN) 1 % GEL topical gel Apply 4 grams to knees or 2 grams to hands four times daily using enclosed dosing card. 100 g 1     gabapentin (NEURONTIN) 300 MG capsule Take 1-2 capsules by mouth every 8 hours 540 capsule 3     HYDROcodone-acetaminophen (NORCO) 5-325 MG per tablet Take 1 tablet by mouth every 6 hours as needed 40 tablet 0     ketoconazole (NIZORAL) 2 % shampoo Apply topically daily as needed for itching or irritation 120 mL 11     levothyroxine (SYNTHROID/LEVOTHROID) 112 MCG tablet Take 0.5 tab daily 45 tablet 3     lisinopril (PRINIVIL/ZESTRIL) 20 MG tablet Take 1 tablet (20 mg) by mouth daily 30 tablet 3     LORazepam (ATIVAN) 2 MG tablet Take 1 tablet at night for sleep 30 tablet 1     metoprolol succinate (TOPROL-XL) 25 MG 24 hr tablet Take 1 tablet (25 mg) by mouth daily 90 tablet 0     Multiple Vitamin (MULTI-VITAMIN) per tablet Take 1 tablet by mouth daily.       Omega-3 Fatty Acids (OMEGA-3 FISH OIL PO) Take 1 capsule by mouth daily        ondansetron (ZOFRAN-ODT) 4 MG ODT tab 1-2 tabs po prn every 6 hours 90 tablet 2     terbinafine (LAMISIL AT) 1 % cream Apply topically 2 times daily For fungal infection not resolved with other antifungals (e.g. Clotrimazole) 12 g 1     triamcinolone (KENALOG) 0.1 % ointment Apply topically 2 times daily 30 g 1     triamterene-hydrochlorothiazide (MAXZIDE-25) 37.5-25 MG per tablet Take 1 tablet by mouth daily 90 tablet 3     Vaginal Lubricant (REPHRESH) GEL Place 2 g vaginally every 3 days 14 Box 3     VOLTAREN 1 % GEL topical gel Apply 4 grams to knees or 2 grams to hands four times daily using enclosed dosing card. 100 g 1     Allergies   Allergen Reactions     Penicillins Hives     Review of Systems:  -Constitutional: The patient denies fatigue, fevers, chills, unintended weight loss, and night sweats.  -HEENT: Patient denies nonhealing oral sores.  -Skin: As above in HPI. No additional skin concerns.    Physical  exam:  Vitals: There were no vitals taken for this visit.  GEN: This is a well developed, well-nourished female in no acute distress, in a pleasant mood.   SKIN: Full skin, which includes the head/face, both arms, chest, back, abdomen,both legs, genitalia and/or groin buttocks, digits and/or nails, was examined. Patient's skin shows evidence of prolonged ultraviolet light exposure.    -Brown stuck on waxy papule on the right neck   - Several scattered stuck on papules and plaques on the stomach and inguinal folds   -Pinpoint skin colored papule which is soft in consistency on the lower belly, stalk  -Evidence of radiation exposure on the anterior chest with scattered petechiae in a linear pattern down the anterior chest with superficial telangiectasias   - scars on both the right and left anterior chest, appear well healed     SKs: Cryotherapy procedure note: After verbal consent and discussion of risks and benefits including but no limited to dyspigmentation/scar, blister, and pain, 5 was(were) treated with 1-2mm freeze border for 2 cycles with liquid nitrogen. Post cryotherapy instructions were provided.    Impression/Plan:  1. Seborrheic keratosis, symptomatic    Cryotherapy procedure note (performed by faculty): After verbal consent and discussion of risks and benefits including but no limited to dyspigmentation/scar, blister, infection, recurrence,4 was(were) treated with 1-2mm freeze border for 2 cycles with liquid nitrogen. Post cryotherapy instructions were provided.     2. Skin tag, symptomatic    Cryotherapy procedure note (performed by faculty): After verbal consent and discussion of risks and benefits including but no limited to dyspigmentation/scar, blister, infection, recurrence, 1 was(were) treated with 1-2mm freeze border for 2 cycles with liquid nitrogen. Post cryotherapy instructions were provided.     3. History of SCC of the perineum, tx w/ excision and radiation, no evidence of recurrence on  today's exam     Requires annual skin cancer screening checks     4. Seborrheic dermatitis  - refilled ketoconazole 2% shampoo for use three times per week    5. Tinea pedis  - prescribed terbinafine 1% cream for use BID for 2 weeks, then PRN recurrences    6. History of eczematous dermatitis of the hands - potential occupational exacerbations (nurse, soaps)  - refilled triamcinolone 0.1% ointment for use BID PRN    Follow-up in 1 year, earlier for new or changing lesions.     Staff Involved:    I, Kelly Dickey, MS4, am acting as the scribe for Abe Wilkerson MD. All aspects of the exam and documentation were approved by the attending physician.    Kelly Dickey, MS4   [facility]  Pager: [phone]    Staff Physician:  I was present with the medical student who participated in the service and in the documentation of the note. I have verified the history and personally performed the physical exam and medical decision making. I agree with the assessment and plan of care as documented in the note.     The procedure(s) was(were) performed by myself.  Cryotherapy procedure note: After verbal consent and discussion of risks and benefits, total of 5 benign lesions was(were) treated with liquid nitrogen cryotherapy for 1  freeze/thaw cycles with 1-2mm borders. Post-cryotherapy wound care instructions were discussed and provided in written format.     Abe Wilkerson MD  Staff Dermatologist and Dermatopathologist  , Department of Dermatology

## 2018-10-16 NOTE — LETTER
10/16/2018       RE: Nadira Perez  [address]  [facility] [address]     Dear Colleague,    Thank you for referring your patient, Nadira Perez, to the [facility] DERMATOLOGY at [facility]. Please see a copy of my visit note below.    [facility] Dermatology Note    Dermatology Problem List:  1. History of SCC of the perineum, tx w/ excision and radiation  - Last TBSE 10/16/18   2. SK's. Multiple over chest back and face, and legs  3. Tinea pedis w/ onychomycosis   - Terbinafine cream  4. Seborrheic dermatitis   - 2% ketoconazole shampoo, alternate with Head and Shoulders  4. Hand dermatitis: Triamcinolone 0.1% cream PRN    Encounter Date: Oct 16, 2018    CC:  Chief Complaint   Patient presents with     Derm Problem     Ismael is here for a skin check.      History of Present Illness:  Ms. Nadira Perez is a 68 year old female with a history of SCC of the perineum many years ago, treated w/ excision and radiation who presents as a follow-up for annual skin exam. The patient was last seen on 3/24/17 at which time she had no evidence of recurrence.     Patient endorses several areas of concern. She has several lesions on the panty line (inguinal folds) bilaterally and two lesions on the abdomen. They itch and are painful to touch. They bother her regularly, especially those on the abdomen and in the inguinal fold. She is interested in hearing about treatment options.      To review her history of risk, she has stated she had a large amount of sun exposure when she was younger, especially to the face and shoulders. Now she wears sunscreen all the time and avoids the sun in general. She has no personal history of melanoma, but does have a family history of it in her aunt. She has used tanning beds <10 times in her lifetime.     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     Corneal opacity     Hypothyroidism     Osteopenia, unspecified location     Aromatase inhibitor use     Past Medical History:   Diagnosis Date     Arthritis      Bone disease      Breast cancer (H)      H/O kyphoplasty      Hearing problem      History of kidney stones      History of radiation therapy      Hyperlipemia      Hypertension      Hypopotassemia      Kidney problem      Lymph edema      Medullary sponge kidney      Osteopenia      PONV (postoperative nausea and vomiting)      Reduced vision      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     Thyroid disease      Past Surgical History:   Procedure Laterality Date     ABDOMEN SURGERY      ovarian cyst, mesh     ARTHRODESIS WRIST       ARTHRODESIS WRIST  2/14/2013    Procedure: ARTHRODESIS WRIST;  left wrist scaphoid excision, four bone fusion, iliac crest bone graft  ( Mac with block);  Surgeon: Av Mendez MD;  [facility]     BIOPSY      skin, vaginal     BIOPSY OF SKIN LESION       CATARACT IOL, RT/LT Right 03/13/2018     CATARACT IOL, RT/LT Left 02/20/2018     COLONOSCOPY  12/24/2013    Procedure: COMBINED COLONOSCOPY, SINGLE BIOPSY/POLYPECTOMY BY BIOPSY;  COLONOSCOPY;  Surgeon: Dom Alvarez MD;  Location:  GI     COSMETIC SURGERY       ESOPHAGOSCOPY, GASTROSCOPY, DUODENOSCOPY (EGD), COMBINED N/A 11/23/2016    Procedure: COMBINED ESOPHAGOSCOPY, GASTROSCOPY, DUODENOSCOPY (EGD);  Surgeon: Quinten Feliciano MD;  Location:  GI     EXTERNAL EAR SURGERY      right      EYE SURGERY      radial keratomy     GRAFT BONE FROM ILIAC CREST  2/14/2013    Procedure: GRAFT BONE FROM ILIAC CREST;  mac with block and local infilitration;  Surgeon: Av Mendez MD;  [facility]      BREATH HYDROGEN TEST N/A 10/14/2016    Procedure: HYDROGEN BREATH TEST;  Surgeon: Cheri Barron MD;  [facility]     HERNIA REPAIR      UMBILICAL     HERNIA REPAIR      umbilical age 18 mos.     HERNIA REPAIR       HERNIORRHAPHY UMBILICAL  [birth year]31/1952     HYSTERECTOMY TOTAL ABDOMINAL  5/3/00     HYSTERECTOMY TOTAL ABDOMINAL  5/3/2000     MASTECTOMY      PROPHYLACTIC RIGHT BREAST     MASTECTOMY MODIFIED RADICAL      LEFT BREAST     MASTECTOMY MODIFIED RADICAL      bilateral; right breast prophylactic     PARATHYROIDECTOMY  9/23/04    R SUPERIOR     PARATHYROIDECTOMY  9/23/2004    parathyroid resection, subtotal     REMOVE HARDWARE HAND  9/24/2013    Procedure: REMOVE HARDWARE HAND;  Left Hand Screw Removal        RHINOPLASTY  12/31/1985     RHINOPLASTY  12/31/1985     thyr proc skin closed cosmetic manner by subcuticular stitch  1/23/2009     TONSILLECTOMY  3/1/68     TONSILLECTOMY  3/1/1968     WRIST SURGERY      wrist arthrodesis     Social History:  The patient works as a nurse, 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 The patient used to use tanning beds, est. 10 times in her lifetime    Family History:  There is a family history of melanoma in the patient's aunt.    Medications:  Current Outpatient Prescriptions   Medication Sig Dispense Refill     amLODIPine (NORVASC) 5 MG tablet Take 1 tablet (5 mg) by mouth At Bedtime 90 tablet 3     anastrozole (ARIMIDEX) 1 MG tablet Take 1 tablet (1 mg) by mouth daily 90 tablet 3     atorvastatin (LIPITOR) 40 MG tablet Take 1 tablet (40 mg) by mouth daily Please put on profile- pt just received 90-day supply of  cholesterol meds 90 tablet 3     cholecalciferol (VITAMIN D) 1000 UNIT tablet Take 1 tablet (1,000 Units) by mouth daily 100 tablet 3     cholecalciferol (VITAMIN D3) 5000 UNITS CAPS capsule Take 1 capsule (5,000 Units) by mouth daily 120 capsule 3     Coenzyme Q10 (COQ10) 200 MG CAPS Take 1 capsule by mouth daily       diclofenac (VOLTAREN) 1 % GEL topical gel Apply 4 grams to knees or 2 grams to hands four times daily using enclosed dosing card. 100 g 1     gabapentin (NEURONTIN) 300 MG capsule Take 1-2 capsules by mouth every 8 hours 540 capsule 3     HYDROcodone-acetaminophen (NORCO) 5-325 MG per tablet Take 1 tablet by mouth every 6 hours as needed 40 tablet 0     ketoconazole (NIZORAL) 2 % shampoo Apply topically daily as needed for itching or irritation 120 mL 11     levothyroxine (SYNTHROID/LEVOTHROID) 112 MCG tablet Take 0.5 tab daily 45 tablet 3     lisinopril (PRINIVIL/ZESTRIL) 20 MG tablet Take 1 tablet (20 mg) by mouth daily 30 tablet 3     LORazepam (ATIVAN) 2 MG tablet Take 1 tablet at night for sleep 30 tablet 1     metoprolol succinate (TOPROL-XL) 25 MG 24 hr tablet Take 1 tablet (25 mg) by mouth daily 90 tablet 0     Multiple Vitamin (MULTI-VITAMIN) per tablet Take 1 tablet by mouth daily.       Omega-3 Fatty Acids (OMEGA-3 FISH OIL PO) Take 1 capsule by mouth daily        ondansetron (ZOFRAN-ODT) 4 MG ODT tab 1-2 tabs po prn every 6 hours 90 tablet 2     terbinafine (LAMISIL AT) 1 % cream Apply topically 2 times daily For fungal infection not resolved with other antifungals (e.g. Clotrimazole) 12 g 1     triamcinolone (KENALOG) 0.1 % ointment Apply topically 2 times daily 30 g 1     triamterene-hydrochlorothiazide (MAXZIDE-25) 37.5-25 MG per tablet Take 1 tablet by mouth daily 90 tablet 3     Vaginal Lubricant (REPHRESH) GEL Place 2 g vaginally every 3 days 14 Box 3     VOLTAREN 1 % GEL topical gel Apply 4 grams to knees or 2 grams to hands four times daily using enclosed dosing card. 100 g 1      Allergies   Allergen Reactions     Penicillins Hives     Review of Systems:  -Constitutional: The patient denies fatigue, fevers, chills, unintended weight loss, and night sweats.  -HEENT: Patient denies nonhealing oral sores.  -Skin: As above in HPI. No additional skin concerns.    Physical exam:  Vitals: There were no vitals taken for this visit.  GEN: This is a well developed, well-nourished female in no acute distress, in a pleasant mood.   SKIN: Full skin, which includes the head/face, both arms, chest, back, abdomen,both legs, genitalia and/or groin buttocks, digits and/or nails, was examined. Patient's skin shows evidence of prolonged ultraviolet light exposure.    -Brown stuck on waxy papule on the right neck   - Several scattered stuck on papules and plaques on the stomach and inguinal folds   -Pinpoint skin colored papule which is soft in consistency on the lower belly, stalk  -Evidence of radiation exposure on the anterior chest with scattered petechiae in a linear pattern down the anterior chest with superficial telangiectasias   - scars on both the right and left anterior chest, appear well healed     SKs: Cryotherapy procedure note: After verbal consent and discussion of risks and benefits including but no limited to dyspigmentation/scar, blister, and pain, 5 was(were) treated with 1-2mm freeze border for 2 cycles with liquid nitrogen. Post cryotherapy instructions were provided.    Impression/Plan:  1. Seborrheic keratosis, symptomatic    Cryotherapy procedure note (performed by faculty): After verbal consent and discussion of risks and benefits including but no limited to dyspigmentation/scar, blister, infection, recurrence,4 was(were) treated with 1-2mm freeze border for 2 cycles with liquid nitrogen. Post cryotherapy instructions were provided.     2. Skin tag, symptomatic    Cryotherapy procedure note (performed by faculty): After verbal consent and discussion of risks and benefits including  but no limited to dyspigmentation/scar, blister, infection, recurrence, 1 was(were) treated with 1-2mm freeze border for 2 cycles with liquid nitrogen. Post cryotherapy instructions were provided.     3. History of SCC of the perineum, tx w/ excision and radiation, no evidence of recurrence on today's exam     Requires annual skin cancer screening checks     4. Seborrheic dermatitis  - refilled ketoconazole 2% shampoo for use three times per week    5. Tinea pedis  - prescribed terbinafine 1% cream for use BID for 2 weeks, then PRN recurrences    6. History of eczematous dermatitis of the hands - potential occupational exacerbations (nurse, soaps)  - refilled triamcinolone 0.1% ointment for use BID PRN    Follow-up in 1 year, earlier for new or changing lesions.     Staff Involved:    I, Kelly Dickey, MS4, am acting as the scribe for Abe Wilkerson MD. All aspects of the exam and documentation were approved by the attending physician.    Kelly Dickey, MS4   [facility]  Pager: [phone]    Staff Physician:  I was present with the medical student who participated in the service and in the documentation of the note. I have verified the history and personally performed the physical exam and medical decision making. I agree with the assessment and plan of care as documented in the note.     The procedure(s) was(were) performed by myself.  Cryotherapy procedure note: After verbal consent and discussion of risks and benefits, total of 5 benign lesions was(were) treated with liquid nitrogen cryotherapy for 1  freeze/thaw cycles with 1-2mm borders. Post-cryotherapy wound care instructions were discussed and provided in written format.     Abe Wilkerson MD  Staff Dermatologist and Dermatopathologist  , Department of Dermatology

## 2018-10-16 NOTE — MR AVS SNAPSHOT
After Visit Summary   10/16/2018    Nadira Perez    MRN: [id number]           Patient Information     Date Of Birth          1950        Visit Information        Provider Department      10/16/2018 9:40 AM Abe Wilkerson MD [facility] Dermatology        Today's Diagnoses     Dermatitis, seborrheic        Eczema, unspecified type        Tinea pedis of both feet          Care Instructions    CRYOTHERAPY    What is it?    Use of a very cold liquid, such as liquid nitrogen, to freeze and destroy abnormal skin cells that need to be removed    What should I expect?    Tenderness and redness    A small blister that might grow and fill with dark purple blood. There may be crusting.    More than one treatment may be needed if the lesions do not go away.    How do I care for the treated area?    Gently wash the area with your hands when bathing.    Use a thin layer of Vaselin to help with healing. You may use a Band-Aid.     The area should heal within 7-10 days.    Do not use an antibiotic or Neosporin ointment.     You may take acetaminophen (Tylenol) for pain.     Call your Doctor if you have:    Severe pain    Signs of infection (warmth, redness, cloudy yellow drainage, and or a bad smell)    Questions or concerns    Contact infromation:  [facility] [phone]  [facility] [phone]    Understanding Seborrheic Keratosis  Seborrheic keratoses are wart-like growths on the skin. These growths are not cancer. Many people get them, especially after age 30.  How to say it  [id number]   What causes seborrheic keratoses?  Doctors do not know what causes seborrheic keratoses. They may run in families. In addition, they become more common as people get older.  What are the symptoms of seborrheic keratoses?  Here is what seborrheic keratoses often look like:    They tend to be round or oval in shape. They can be very  small or about as big across as a quarter.    They can appear singly or in clusters.    They tend to be tan, brown, or black in color.    The edges may be scalloped or uneven-looking.    They can have a waxy or scaly look.    They can be a bit raised, appearing to be  stuck on  the skin.    They may become red and irritated if scratched or rubbed by clothing  Sebhorreic keratoses are not painful, but they may be itchy. They can become irritated if they are continually rubbed by skin or clothing. Seborrheic keratoses most often appear on the face, arms, chest, back, or belly.  How are seborrheic keratoses treated?  Seborrheic keratoses don t need to be treated unless they bother you. You may choose to have them removed because you find them unattractive. You may also want them removed because they get irritated and uncomfortable. A healthcare provider can remove them in an office visit. Ways that seborrheic keratoses can be removed include:    Freezing them off with liquid nitrogen    Cutting them off with a scalpel    Burning them off with electricity  What are the complications of seborrheic keratoses?  Seborrheic keratoses are not cancer, but they can look like some types of skin cancer. So it s a good idea to ask your healthcare provider to check any new skin growths. Ask your healthcare provider about any skin problem that concerns you.  When should I call my healthcare provider?  Call your healthcare provider right away if any of these occur:    You develop a lot of seborrheic keratoses very quickly    You have a sore that does not heal within a few weeks, or heals and then comes back    You have a mole or skin growth that is changing in size, shape, or color    You have a mole or skin growth that looks different on one side from the other    You have a mole or skin growth that is not the same color throughout   Date Last Reviewed: 5/1/2016 2000-2017 [employer]. [address] [facility],  [place] [address]. All rights reserved. This information is not intended as a substitute for professional medical care. Always follow your healthcare professional's instructions.                  Follow-ups after your visit        Follow-up notes from your care team     Return in about 1 year (around 10/16/2019).      Your next 10 appointments already scheduled     Oct 16, 2018 11:00 AM CDT   (Arrive by 10:45 AM)   Office Visit with Kisha Heller RPH   [facility] Therapy [facility] ([facility])    [address]  4th Floor  [facility] [address] [phone]           Bring a current list of meds and any records pertaining to this visit. For Physicals, please bring immunization records and any forms needing to be filled out. Please arrive 10 minutes early to complete paperwork.            Oct 18, 2018  3:30 PM CDT   (Arrive by 3:15 PM)   PH[facility]ICAL with [facility] ([facility])    [address]  4th Floor  [facility] [address] [phone]            Nov 12, 2018  9:30 AM CST   [facility] Lab Draw with  [facility] DRAW   [facility]    [address]  [address]  [place] [facility] [address]   [phone]            Nov 12, 2018 10:00 AM CST   (Arrive by 9:45 AM)   Return Visit with Khushbu Louise MD   [facility] ([facility])    [address]  [address]  [place] [facility] [address]   [phone]            Nov 12, 2018 10:30 AM CST   Infusion 60 with UC ONCOLOGY INFUSION, UC 31 ATC   [facility] ([facility])    [address]  [address]  [place] [facility] [address]   [phone]            Dec 21, 2018 12:15 PM CST   RETURN CORNEA with Tyson Ruby MD   [facility] Clinic ([facility])    [address] [address]  [facility]  9th [address] Clin [address]  [facility] [address]   [phone]              Who to contact     Please call your clinic at [phone] to:    Ask questions about your health    Make or cancel appointments    Discuss your medicines    Learn about your test results    Speak to your doctor            Additional Information About Your Visit        MyChart Information     "[employer]t gives you secure access to your electronic health record. If you see a primary care provider, you can also send messages to your care team and make appointments. If you have questions, please call your primary care clinic.  If you do not have a primary care provider, please call [phone] and they will assist you.      [employer] is an electronic gateway that provides easy, online access to your medical records. With [employer], you can request a clinic appointment, read your test results, renew a prescription or communicate with your care team.     To access your existing account, please contact your [facility] or call [phone] for assistance.        Care EveryWhere ID     This is your Care EveryWhere ID. This could be used by other organizations to access your [place] medical records  [id number]         Blood Pressure from Last 3 Encounters:   07/24/18 (P) 134/85   07/11/18 (!) 165/110   06/28/18 153/89    Weight from Last 3 Encounters:   07/11/18 97.5 kg (215 lb)   06/28/18 97.5 kg (215 lb)   06/06/18 97.5 kg (215 lb)              Today, you had the following     No orders found for display       Primary Care Provider Office Phone # Fax #    Matilde MERCEDES Taylor [phone]       [address] [place] [address]  [facility] [address]        Equal Access to Services     EDEL LAMA : lynda Davila, cierra penny. So [facility] [phone].    ATENCIÓN: Si habla español, tiene a davis disposición servicios  gerson de asistencia lingüística. Garrick marvin [phone].    We comply with applicable federal civil rights laws and [place] laws. We do not discriminate on the basis of race, color, national origin, age, disability, sex, sexual orientation, or gender identity.            Thank you!     Thank you for choosing [facility] DERMATOLOGY  for your care. Our goal is always to provide you with excellent care. Hearing back from our patients is one way we can continue to improve our services. Please take a few minutes to complete the written survey that you may receive in the mail after your visit with us. Thank you!             Your Updated Medication List - Protect others around you: Learn how to safely use, store and throw away your medicines at www.disposemymeds.org.          This list is accurate as of 10/16/18 10:22 AM.  Always use your most recent med list.                   Brand Name Dispense Instructions for use Diagnosis    amLODIPine 5 MG tablet    NORVASC    90 tablet    Take 1 tablet (5 mg) by mouth At Bedtime    Essential hypertension, benign       anastrozole 1 MG tablet    ARIMIDEX    90 tablet    Take 1 tablet (1 mg) by mouth daily    Malignant neoplasm of left breast in female, estrogen receptor positive, unspecified site of breast (H)       atorvastatin 40 MG tablet    LIPITOR    90 tablet    Take 1 tablet (40 mg) by mouth daily Please put on profile- pt just received 90-day supply of cholesterol meds    Pure hypercholesterolemia       * cholecalciferol 1000 UNIT tablet    vitamin D3    100 tablet    Take 1 tablet (1,000 Units) by mouth daily    Osteoporosis       * cholecalciferol 5000 units Caps capsule    vitamin D3    120 capsule    Take 1 capsule (5,000 Units) by mouth daily    Vaginal dryness       CoQ10 200 MG Caps      Take 1 capsule by mouth daily        * diclofenac 1 % Gel topical gel    VOLTAREN    100 g    Apply 4 grams to knees or 2 grams to hands four times daily using enclosed dosing  card.    Primary osteoarthritis of both wrists       * VOLTAREN 1 % Gel topical gel   Generic drug:  diclofenac     100 g    Apply 4 grams to knees or 2 grams to hands four times daily using enclosed dosing card.    Right knee pain, unspecified chronicity       gabapentin 300 MG capsule    NEURONTIN    540 capsule    Take 1-2 capsules by mouth every 8 hours    Neuropathic pain       HYDROcodone-acetaminophen 5-325 MG per tablet    NORCO    40 tablet    Take 1 tablet by mouth every 6 hours as needed    Chronic left-sided low back pain, with sciatica presence unspecified       ketoconazole 2 % shampoo    NIZORAL    120 mL    Apply topically daily as needed for itching or irritation    Dermatitis, seborrheic       levothyroxine 112 MCG tablet    SYNTHROID/LEVOTHROID    45 tablet    Take 0.5 tab daily    Hypothyroidism, unspecified type       lisinopril 20 MG tablet    PRINIVIL/ZESTRIL    30 tablet    Take 1 tablet (20 mg) by mouth daily    Benign essential hypertension       LORazepam 2 MG tablet    ATIVAN    30 tablet    Take 1 tablet at night for sleep    Sleep disorder       metoprolol succinate 25 MG 24 hr tablet    TOPROL-XL    90 tablet    Take 1 tablet (25 mg) by mouth daily    Irregular heart rate       Multi-vitamin Tabs tablet   Generic drug:  multivitamin, therapeutic with minerals      Take 1 tablet by mouth daily.        OMEGA-3 FISH OIL PO      Take 1 capsule by mouth daily        ondansetron 4 MG ODT tab    ZOFRAN-ODT    90 tablet    1-2 tabs po prn every 6 hours    Nausea       REPHRESH Gel     14 Box    Place 2 g vaginally every 3 days    Urinary tract infection, site not specified       terbinafine 1 % cream    lamISIL AT    12 g    Apply topically 2 times daily For fungal infection not resolved with other antifungals (e.g. Clotrimazole)    Tinea pedis of both feet       triamcinolone 0.1 % ointment    KENALOG    30 g    Apply topically 2 times daily    Eczematous dermatitis        triamterene-hydrochlorothiazide 37.5-25 MG per tablet    MAXZIDE-25    90 tablet    Take 1 tablet by mouth daily    Essential hypertension, benign       * Notice:  This list has 4 medication(s) that are the same as other medications prescribed for you. Read the directions carefully, and ask your doctor or other care provider to review them with you.

## 2018-10-16 NOTE — PROGRESS NOTES
SUBJECTIVE/OBJECTIVE:                           Nadira Perez is a 68 year old female coming in for an initial visit for Medication Therapy Management.  She was referred to me from self and was previously seen by Bee Hudson, VilmaD.    Chief Complaint: Hypertension.    Allergies/ADRs: Reviewed in Epic  Tobacco: No tobacco use  Alcohol: Less than 1 beverage / month  Caffeine: 4-6 diet coke daily  Activity: She walks most days of the week and pilates reformer once weekly, she is actively working to lose help  PMH: Reviewed in Epic    Medication Adherence/Access:  no issues reported    Hypertension: Current medications include amlodipine 5 mg daily, lisinopril 40 mg daily, metoprolol XL 25 mg daily and triamterene-hydrochlorothiazide 37.5-25 mg daily.  Patient does not self-monitor BP.  Patient reports no current medication side effects. She was given losartan in the past but wasn't given a dosing titration. She was changed to lisinopril and has not experienced cough. She self-increased to lisinopril 40 mg daily. She wants to try amlodipine 10 mg daily to help improve blood pressure control. She has questions about the use of lisinopril with her other medications and what might be the best course of action going forward to control her blood pressure.     Last Comprehensive Metabolic Panel:  Sodium   Date Value Ref Range Status   02/15/2018 141 133 - 144 mmol/L Final     Potassium   Date Value Ref Range Status   02/15/2018 3.2 (L) 3.4 - 5.3 mmol/L Final     Chloride   Date Value Ref Range Status   02/15/2018 104 94 - 109 mmol/L Final     Carbon Dioxide   Date Value Ref Range Status   02/15/2018 29 20 - 32 mmol/L Final     Anion Gap   Date Value Ref Range Status   02/15/2018 8 3 - 14 mmol/L Final     Glucose   Date Value Ref Range Status   02/15/2018 108 (H) 70 - 99 mg/dL Final     Urea Nitrogen   Date Value Ref Range Status   02/15/2018 15 7 - 30 mg/dL Final     Creatinine   Date Value Ref Range Status    05/14/2018 0.52 0.52 - 1.04 mg/dL Final     GFR Estimate   Date Value Ref Range Status   05/14/2018 >90 >60 mL/min/1.7m2 Final     Comment:     Non  GFR Calc     Calcium   Date Value Ref Range Status   05/14/2018 9.3 8.5 - 10.1 mg/dL Final     CrCl cannot be calculated (Patient's most recent sCr result is older than the maximum 90 days allowed.).    Wrist Pain/Meniscus Tear/Chronic Low Back Pain: Current medications include hydrocodone-APAP 5-325 mg less than twice monthly for bad aches and pains. She has considered tramadol but is resistant to this change.  She is using ibuprofen 600 mg once daily; she intends to continue reducing this dose and has purchased  mg tablets.  In the past, she has taken a great deal more of ibuprofen.  She is also taking glucosamine/chondroitin but isn't sure how well this works.  Lastly, she takes gabapentin 300 mg three times daily for neuritis and feels this helps but for different quality of pain.      S/p Breast Cancer: Current medications include anastrazole 1 mg daily. She has been on tamoxifen x5 years and then was put back on anastrazole 1 mg daily for an additional 5 years. She has had a double mastectomy.  She endorses no side effects related to this medication.     Today's Vitals: /88 (BP Location: Right arm, Patient Position: Sitting, Cuff Size: Adult Large)  Pulse 74    It is noted that the patient only scheduled 30 minutes for our visit and we could not cover all of her meds in that time period despite going over our time.  ASSESSMENT:                             Current medications were reviewed today.     Medication Adherence: good, no issues identified    Hypertension: Needs Improvement. Patient is not meeting BP goal of < 130/80mmHg. Patient would benefit from increased dose of amlodipine and is due for an updated BMP.     Wrist Pain/Meniscus Tear/Chronic Low Back Pain: Stable. However, patient is likely increasing her blood pressure  and increasing risk for kidney injury with frequent use of relatively high doses of ibuprofen.  Patient would benefit from continuing to work with PCP to improve pain control and working on increasing physical activity.     S/p Breast Cancer: Stable. Plan in place with oncology.     PLAN:                            1) Increase amlodipine to 10 mg daily. New rx is sent to the pharmacy.   2) Continue to reduce use of ibuprofen.      I spent 40 minutes with this patient today. All changes were made via collaborative practice agreement with Matilde Quiñonez. A copy of the visit note was provided to the patient's primary care provider.    Will follow up in 1 month.    The patient was sent via [facility] a summary of these recommendations as an after visit summary.     Kisha Heller, Pharm.D., BCACP  Medication Therapy Management Pharmacist  Page/VM:  [phone]

## 2018-10-16 NOTE — Clinical Note
Gagan Clayton! I had a great visit with Ismael last week. We will continue to work on bp control together. I have asked that she continue to reduce her ibuprofen use and we reviewed the risks of regular use re: blood pressure and renal function.

## 2018-10-16 NOTE — PATIENT INSTRUCTIONS
CRYOTHERAPY    What is it?    Use of a very cold liquid, such as liquid nitrogen, to freeze and destroy abnormal skin cells that need to be removed    What should I expect?    Tenderness and redness    A small blister that might grow and fill with dark purple blood. There may be crusting.    More than one treatment may be needed if the lesions do not go away.    How do I care for the treated area?    Gently wash the area with your hands when bathing.    Use a thin layer of Vaselin to help with healing. You may use a Band-Aid.     The area should heal within 7-10 days.    Do not use an antibiotic or Neosporin ointment.     You may take acetaminophen (Tylenol) for pain.     Call your Doctor if you have:    Severe pain    Signs of infection (warmth, redness, cloudy yellow drainage, and or a bad smell)    Questions or concerns    Contact infromation:  [facility] [phone]  [facility] [phone]    Understanding Seborrheic Keratosis  Seborrheic keratoses are wart-like growths on the skin. These growths are not cancer. Many people get them, especially after age 30.  How to say it  [id number]   What causes seborrheic keratoses?  Doctors do not know what causes seborrheic keratoses. They may run in families. In addition, they become more common as people get older.  What are the symptoms of seborrheic keratoses?  Here is what seborrheic keratoses often look like:    They tend to be round or oval in shape. They can be very small or about as big across as a quarter.    They can appear singly or in clusters.    They tend to be tan, brown, or black in color.    The edges may be scalloped or uneven-looking.    They can have a waxy or scaly look.    They can be a bit raised, appearing to be  stuck on  the skin.    They may become red and irritated if scratched or rubbed by clothing  Sebhorreic keratoses are not painful, but they may be itchy.  They can become irritated if they are continually rubbed by skin or clothing. Seborrheic keratoses most often appear on the face, arms, chest, back, or belly.  How are seborrheic keratoses treated?  Seborrheic keratoses don t need to be treated unless they bother you. You may choose to have them removed because you find them unattractive. You may also want them removed because they get irritated and uncomfortable. A healthcare provider can remove them in an office visit. Ways that seborrheic keratoses can be removed include:    Freezing them off with liquid nitrogen    Cutting them off with a scalpel    Burning them off with electricity  What are the complications of seborrheic keratoses?  Seborrheic keratoses are not cancer, but they can look like some types of skin cancer. So it s a good idea to ask your healthcare provider to check any new skin growths. Ask your healthcare provider about any skin problem that concerns you.  When should I call my healthcare provider?  Call your healthcare provider right away if any of these occur:    You develop a lot of seborrheic keratoses very quickly    You have a sore that does not heal within a few weeks, or heals and then comes back    You have a mole or skin growth that is changing in size, shape, or color    You have a mole or skin growth that looks different on one side from the other    You have a mole or skin growth that is not the same color throughout   Date Last Reviewed: 5/1/2016 2000-2017 [employer]. [address] [place] [address], [place] [address]. All rights reserved. This information is not intended as a substitute for professional medical care. Always follow your healthcare professional's instructions.

## 2018-10-18 ENCOUNTER — OFFICE VISIT (OUTPATIENT)
Dept: INTERNAL MEDICINE | Facility: CLINIC | Age: 68
End: 2018-10-18
Payer: MEDICARE

## 2018-10-18 VITALS
SYSTOLIC BLOOD PRESSURE: 142 MMHG | BODY MASS INDEX: 30.73 KG/M2 | OXYGEN SATURATION: 99 % | HEART RATE: 82 BPM | HEIGHT: 67 IN | TEMPERATURE: 99.2 F | DIASTOLIC BLOOD PRESSURE: 88 MMHG

## 2018-10-18 DIAGNOSIS — Z23 NEED FOR SHINGLES VACCINE: Primary | ICD-10-CM

## 2018-10-18 DIAGNOSIS — Z12.4 SCREENING FOR CERVICAL CANCER: ICD-10-CM

## 2018-10-18 DIAGNOSIS — I10 ESSENTIAL HYPERTENSION, BENIGN: ICD-10-CM

## 2018-10-18 DIAGNOSIS — E03.9 HYPOTHYROIDISM, UNSPECIFIED TYPE: ICD-10-CM

## 2018-10-18 DIAGNOSIS — M54.5 CHRONIC LEFT-SIDED LOW BACK PAIN, WITH SCIATICA PRESENCE UNSPECIFIED: ICD-10-CM

## 2018-10-18 DIAGNOSIS — G47.9 SLEEP DISORDER: ICD-10-CM

## 2018-10-18 DIAGNOSIS — I49.9 IRREGULAR HEART RATE: ICD-10-CM

## 2018-10-18 DIAGNOSIS — M79.2 NEUROPATHIC PAIN: ICD-10-CM

## 2018-10-18 DIAGNOSIS — E87.6 HYPOPOTASSEMIA: ICD-10-CM

## 2018-10-18 DIAGNOSIS — N20.0 CALCULUS OF KIDNEY: ICD-10-CM

## 2018-10-18 DIAGNOSIS — G89.29 CHRONIC LEFT-SIDED LOW BACK PAIN, WITH SCIATICA PRESENCE UNSPECIFIED: ICD-10-CM

## 2018-10-18 DIAGNOSIS — I10 BENIGN ESSENTIAL HYPERTENSION: ICD-10-CM

## 2018-10-18 DIAGNOSIS — E78.00 PURE HYPERCHOLESTEROLEMIA: ICD-10-CM

## 2018-10-18 DIAGNOSIS — R26.89 BALANCE DISORDER: ICD-10-CM

## 2018-10-18 DIAGNOSIS — M81.0 OSTEOPOROSIS, UNSPECIFIED OSTEOPOROSIS TYPE, UNSPECIFIED PATHOLOGICAL FRACTURE PRESENCE: ICD-10-CM

## 2018-10-18 RX ORDER — METOPROLOL SUCCINATE 25 MG/1
25 TABLET, EXTENDED RELEASE ORAL DAILY
Qty: 90 TABLET | Refills: 0 | Status: SHIPPED | OUTPATIENT
Start: 2018-10-18 | End: 2019-03-01

## 2018-10-18 RX ORDER — TRIAMTERENE/HYDROCHLOROTHIAZID 37.5-25 MG
1 TABLET ORAL DAILY
Qty: 90 TABLET | Refills: 3 | Status: SHIPPED | OUTPATIENT
Start: 2018-10-18 | End: 2019-10-22

## 2018-10-18 RX ORDER — LISINOPRIL 20 MG/1
20 TABLET ORAL DAILY
Qty: 30 TABLET | Refills: 3 | Status: SHIPPED | OUTPATIENT
Start: 2018-10-18 | End: 2018-12-14

## 2018-10-18 RX ORDER — AMLODIPINE BESYLATE 5 MG/1
5 TABLET ORAL AT BEDTIME
Qty: 90 TABLET | Refills: 3 | Status: SHIPPED | OUTPATIENT
Start: 2018-10-18 | End: 2019-06-24

## 2018-10-18 RX ORDER — LEVOTHYROXINE SODIUM 112 UG/1
TABLET ORAL
Qty: 45 TABLET | Refills: 3 | Status: SHIPPED | OUTPATIENT
Start: 2018-10-18 | End: 2019-10-17

## 2018-10-18 RX ORDER — ATORVASTATIN CALCIUM 40 MG/1
40 TABLET, FILM COATED ORAL DAILY
Qty: 90 TABLET | Refills: 3 | Status: SHIPPED | OUTPATIENT
Start: 2018-10-18 | End: 2019-10-22

## 2018-10-18 RX ORDER — LORAZEPAM 2 MG/1
TABLET ORAL
Qty: 30 TABLET | Refills: 1 | Status: SHIPPED | OUTPATIENT
Start: 2018-10-18 | End: 2021-05-18

## 2018-10-18 RX ORDER — HYDROCODONE BITARTRATE AND ACETAMINOPHEN 5; 325 MG/1; MG/1
1 TABLET ORAL EVERY 6 HOURS PRN
Qty: 40 TABLET | Refills: 0 | Status: SHIPPED | OUTPATIENT
Start: 2018-10-18 | End: 2019-10-22

## 2018-10-18 RX ORDER — GABAPENTIN 300 MG/1
CAPSULE ORAL
Qty: 540 CAPSULE | Refills: 3 | Status: SHIPPED | OUTPATIENT
Start: 2018-10-18 | End: 2019-10-22

## 2018-10-18 ASSESSMENT — ENCOUNTER SYMPTOMS
SKIN CHANGES: 0
EXERCISE INTOLERANCE: 0
HOARSE VOICE: 0
SHORTNESS OF BREATH: 0
LOSS OF CONSCIOUSNESS: 0
HOT FLASHES: 0
STIFFNESS: 1
DIARRHEA: 0
MEMORY LOSS: 0
HEADACHES: 0
NAUSEA: 1
BACK PAIN: 1
COUGH: 0
LIGHT-HEADEDNESS: 0
EYE REDNESS: 0
NECK MASS: 0
LEG PAIN: 0
BLOOD IN STOOL: 0
EYE PAIN: 0
DOUBLE VISION: 0
DISTURBANCES IN COORDINATION: 0
HYPOTENSION: 0
EYE IRRITATION: 0
WHEEZING: 0
SLEEP DISTURBANCES DUE TO BREATHING: 0
ORTHOPNEA: 0
SEIZURES: 0
POOR WOUND HEALING: 0
HYPERTENSION: 1
TREMORS: 0
ARTHRALGIAS: 1
TINGLING: 1
HEARTBURN: 0
SORE THROAT: 0
DYSPNEA ON EXERTION: 0
NECK PAIN: 0
DIZZINESS: 1
WEAKNESS: 0
HEMOPTYSIS: 0
MYALGIAS: 1
PARALYSIS: 0
PALPITATIONS: 0
SMELL DISTURBANCE: 0
TROUBLE SWALLOWING: 1
MUSCLE CRAMPS: 0
JOINT SWELLING: 0
SNORES LOUDLY: 1
BOWEL INCONTINENCE: 0
SPEECH CHANGE: 0
NUMBNESS: 1
SYNCOPE: 0
JAUNDICE: 0
EYE WATERING: 0
BRUISES/BLEEDS EASILY: 0
POSTURAL DYSPNEA: 0
SPUTUM PRODUCTION: 0
BLOATING: 1
VOMITING: 0
COUGH DISTURBING SLEEP: 0
CONSTIPATION: 0
DECREASED LIBIDO: 1
SWOLLEN GLANDS: 0
ABDOMINAL PAIN: 0
NAIL CHANGES: 0

## 2018-10-18 ASSESSMENT — PAIN SCALES - GENERAL: PAINLEVEL: MODERATE PAIN (4)

## 2018-10-18 NOTE — PROGRESS NOTES
[facility]  Primary Care Center   Matilde BENSONFani Olamide, MERCEDES CNP  10/18/2018      Chief Complaint:   Physical     History of Present Illness:   Nadira Perez is a 68 year old female with a history of inflammatory breast cancer s/p radiation therapy, hypertension, osteoporosis, nephrolithiasis, and hypothyroidism who presents for evaluation of a physical. She would like refill medications. Vicodin for joint pain.     On 4/12/18, the patient had a fall on her left side where she twisted her right knee. She had an x-ray that showed mild medial and patellofemoral DJD, but no acute changes. Two months later, her knee had not improved so she finally received an MRI on 7/6. This showed a tear in the meniscus and she is now wearing a knee brace. Has been following with sports medicine. She has a cane which she uses if she plans to be out for long periods of time and she plans to use in the winter.     Overall, she reports a slow increase in falls with them happening 2-4 times a year now, but not always getting injured. She believes that this is a balance problem of unknown etiology. She does note a time where she was doing a pilate's exercise on her knees and had a balance issue, so she does not think it has to do with her lower legs. She denies that her falls are from dizziness or changes in weather. She had an unremarkable head MRI on 4/21/17. She has a cane but is not using it today.     She is wondering if she can get a disability parking certificate.     Other concerns discussed:  1. Working full time.  2. Last pap smear in 2016 was normal. She would like to continue receiving paps.   3. Reports a difficulty with her cataract surgery. She plans to be reevaluated later this year.   4. Already received her flu shot.   5. Reports losing 15 pounds.   6. Reports that one of her toes is pale when waking up in the morning.   7. Loose stools in the morning.    8.  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  .   Review of Systems:   Pertinent items are noted in HPI or as in patient entered ROS below, remainder of complete ROS is negative.  Answers for HPI/ROS submitted by the patient on 10/18/2018   General Symptoms: No  Skin Symptoms: Yes  HENT Symptoms: Yes  EYE SYMPTOMS: Yes  HEART SYMPTOMS: Yes  LUNG SYMPTOMS: Yes  INTESTINAL SYMPTOMS: Yes  URINARY SYMPTOMS: No  GYNECOLOGIC SYMPTOMS: Yes  BREAST SYMPTOMS: No  SKELETAL SYMPTOMS: Yes  BLOOD SYMPTOMS: Yes  NERVOUS SYSTEM SYMPTOMS: Yes  MENTAL HEALTH SYMPTOMS: No  Changes in hair: No  Changes in moles/birth marks: No  Itching: Yes  Rashes: No  Changes in nails: No  Acne: No  Hair in places you don't want it: No  Change in facial hair: No  Warts: No  Non-healing sores: No  Scarring: No  Flaking of skin: No  Color changes of hands/feet in cold : Yes  Sun sensitivity: No  Skin thickening: No  Ear pain: No  Ear discharge: No  Hearing loss: Yes  Tinnitus: Yes  Trouble swallowing: Yes   Voice hoarseness: No  Mouth sores: No  Sore throat: No  Tooth pain: No  Gum tenderness: No  Bleeding gums: No  Change in sense of smell: No  Dry mouth: Yes  Hearing aid used: Yes  Neck lump: No  Eye pain: No  Vision loss: No  Dry eyes: Yes  Watery eyes: No  Eye bulging: No  Double vision: No  Flashing of lights: No  Spots: No  Floaters: Yes  Redness: No  Crossed eyes: No  Tunnel Vision: No  Yellowing of eyes: No  Eye irritation: No  Cough: No  Sputum or phlegm: No  Coughing up blood: No  Difficulty breating or shortness of breath: No  Snoring: Yes  Wheezing: No  Difficulty breathing on exertion: No  Nighttime Cough: No  Difficulty breathing when lying flat: No  Chest pain or pressure: No  Fast or irregular heartbeat: No  Pain in legs with walking: No  Trouble breathing while lying down: No  Fingers or toes appear blue: No  High blood pressure: Yes  Low blood pressure: No  Fainting: No  Murmurs: No  Pacemaker: No  Varicose veins: No  Edema or swelling: Yes  Wake up at  night with shortness of breath: No  Light-headedness: No  Exercise intolerance: No  Heart burn or indigestion: No  Nausea: Yes  Vomiting: No  Abdominal pain: No  Bloating: Yes  Constipation: No  Diarrhea: No  Blood in stool: No  Black stools: No  Fecal incontinence: No  Yellowing of skin or eyes: No  Vomit with blood: No  Change in stools: No  Back pain: Yes  Muscle aches: Yes  Neck pain: No  Swollen joints: No  Joint pain: Yes  Bone pain: Yes  Muscle cramps: No  Joint stiffness: Yes  Bone fracture: No  Anemia: No  Swollen glands: No  Easy bleeding or bruising: No  Trouble with coordination: No  Dizziness or trouble with balance: Yes  Fainting or black-out spells: No  Memory loss: No  Headache: No  Seizures: No  Speech problems: No  Tingling: Yes  Tremor: No  Weakness: No  Difficulty walking: Yes  Paralysis: No  Numbness: Yes  Bleeding or spotting between periods: No  Heavy or painful periods: No  Irregular periods: No  Vaginal discharge: No  Hot flashes: No  Vaginal dryness: Yes  Genital ulcers: No  Reduced libido: Yes  Painful intercourse: Yes  Difficulty with sexual arousal: Yes  Post-menopausal bleeding: No    Active Medications:      Acetaminophen (APAP 500 PO), Take 1,000 mg by mouth 2 times daily, Disp: , Rfl:      amLODIPine (NORVASC) 5 MG tablet, Take 1 tablet (5 mg) by mouth At Bedtime, Disp: 90 tablet, Rfl: 3     anastrozole (ARIMIDEX) 1 MG tablet, Take 1 tablet (1 mg) by mouth daily, Disp: 90 tablet, Rfl: 3     atorvastatin (LIPITOR) 40 MG tablet, Take 1 tablet (40 mg) by mouth daily Please put on profile- pt just received 90-day supply of cholesterol meds, Disp: 90 tablet, Rfl: 3     cholecalciferol (VITAMIN D) 1000 UNIT tablet, Take 1 tablet (1,000 Units) by mouth daily, Disp: 100 tablet, Rfl: 3     cholecalciferol (VITAMIN D3) 5000 UNITS CAPS capsule, Take 1 capsule (5,000 Units) by mouth daily, Disp: 120 capsule, Rfl: 3     Coenzyme Q10 (COQ10) 200 MG CAPS, Take 1 capsule by mouth daily, Disp: , Rfl:       diclofenac (VOLTAREN) 1 % GEL topical gel, Apply 4 grams to knees or 2 grams to hands four times daily using enclosed dosing card., Disp: 100 g, Rfl: 1     gabapentin (NEURONTIN) 300 MG capsule, Take 1-2 capsules by mouth every 8 hours, Disp: 540 capsule, Rfl: 3     glucosamine-chondroitin 500-400 MG CAPS per capsule, Take 2 capsules by mouth daily, Disp: , Rfl:      HYDROcodone-acetaminophen (NORCO) 5-325 MG per tablet, Take 1 tablet by mouth every 6 hours as needed, Disp: 40 tablet, Rfl: 0     ketoconazole (NIZORAL) 2 % shampoo, Apply topically daily as needed for itching or irritation, Disp: 120 mL, Rfl: 11     levothyroxine (SYNTHROID/LEVOTHROID) 112 MCG tablet, Take 0.5 tab daily, Disp: 45 tablet, Rfl: 3     lisinopril (PRINIVIL/ZESTRIL) 20 MG tablet, Take 1 tablet (20 mg) by mouth daily, Disp: 30 tablet, Rfl: 3     LORazepam (ATIVAN) 2 MG tablet, Take 1 tablet at night for sleep, Disp: 30 tablet, Rfl: 1     metoprolol succinate (TOPROL-XL) 25 MG 24 hr tablet, Take 1 tablet (25 mg) by mouth daily, Disp: 90 tablet, Rfl: 0     Multiple Vitamin (MULTI-VITAMIN) per tablet, Take 1 tablet by mouth daily., Disp: , Rfl:      Omega-3 Fatty Acids (OMEGA-3 FISH OIL PO), Take 1 capsule by mouth daily , Disp: , Rfl:      ondansetron (ZOFRAN-ODT) 4 MG ODT tab, 1-2 tabs po prn every 6 hours, Disp: 90 tablet, Rfl: 2     terbinafine (LAMISIL AT) 1 % cream, Apply topically 2 times daily For fungal infection not resolved with other antifungals (e.g. Clotrimazole), Disp: 24 g, Rfl: 11     triamcinolone (KENALOG) 0.1 % ointment, Apply topically 2 times daily, Disp: 30 g, Rfl: 11     triamterene-hydrochlorothiazide (MAXZIDE-25) 37.5-25 MG per tablet, Take 1 tablet by mouth daily, Disp: 90 tablet, Rfl: 3     Vaginal Lubricant (REPHRESH) GEL, Place 2 g vaginally every 3 days, Disp: 14 Box, Rfl: 3     VOLTAREN 1 % GEL topical gel, Apply 4 grams to knees or 2 grams to hands four times daily using enclosed dosing card., Disp: 100 g,  "Rfl: 1      Allergies:   Penicillins      Past Medical History:  Arthritis  Bone disease  Breast cancer w/ radiation therapy   Hearing problem  Kidney stones  Kidney problem  Lymph edema  Medullary sponge kidney  Reduced vision  Squamous cell carcinoma of vulva  Hypothyroidism   Hypopotassemia  Hyperlipidemia  Murmurs  Nephrolithiasis  Osteoporosis   Corneal opacity  Dermatitis      Past Surgical History:  Ovarian cyst, mesh  Arthrodesis, wrist  Vaginal skin biopsy  Cataract, bilateral  Cosmetic surgery  Right ear surgery  Radial keratomy   Graft bone from iliac crest  umbilical hernia repair  Total hysterectomy  Mastectomy, bilateral   parathyroidectomy  Rhinoplasty  Tonsillectomy  Wrist arthrodesis     Immunizations:  Immunization History   Administered Date(s) Administered     HEPA 04/26/2005, 10/26/2005     HepB 04/26/2005, 06/02/2005, 10/26/2005     Influenza (High Dose) 3 valent vaccine 10/06/2017     Influenza (IIV3) PF 10/01/2008, 09/29/2009, 10/01/2011, 10/02/2012, 10/09/2013, 10/01/2014     Influenza Intranasal Vaccine 4 valent 10/02/2015     Influenza Vaccine IM 3yrs+ 4 Valent IIV4 10/13/2016     Pneumo Conj 13-V (2010&after) 08/01/2017     Pneumococcal 23 valent 10/01/2008, 08/22/2018     TD (ADULT, 7+) 01/10/2000, 04/26/2005     TDAP Vaccine (Boostrix) 11/06/2012     Typhoid IM 04/26/2005     Zoster vaccine recombinant adjuvanted (SHINGRIX) 02/15/2018     Zoster vaccine, live 11/14/2012     Family History:   Father: AAA, hypertension  Mother: aneurysm, dementia, diabetes, thyroid disease, cerebrovascular disease, osteoporosis  Maternal grandmother: diabetes, asthma  Maternal Aunt: diabetes, melanoma, glaucoma  Brother: peripheral neuropathy   Maternal grandfather: chronic obstructive pulmonary disease, asthma       Social History:   The patient is , a nonsmoker, and rarely consumes alcohol.      Physical Exam:   /88  Pulse 82  Temp 99.2  F (37.3  C) (Oral)  Ht 1.704 m (5' 7.09\")  SpO2 " 99%  BMI 30.73 kg/m2   Wt Readings from Last 1 Encounters:   10/16/18 89.2 kg (196 lb 11.2 oz)     Constitutional: no distress, comfortable, pleasant   Eyes: anicteric, conjunctiva pink.  Ears, Nose and Throat: tympanic membranes clear, throat clear.   Neck: surgical scar from parathyroid surgery.   Cardiovascular: regular rate and rhythm, normal S1 and S2, no murmurs, rubs or gallops.  Respiratory: clear to auscultation, no wheezes or crackles, normal breath sounds   Gastrointestinal: positive bowel sounds, nontender, no hepatosplenomegaly, no masses   Musculoskeletal: full range of motion, no edema   Skin: no concerning lesions, no jaundice, temp normal   Neurological:  normal speech, no tremor. A and O x 3, good historian.  Psychological: appropriate mood, good eye contact, normal affect   Lymph: no axillary, cervical,  supraclavicular, infraclavicular or inguinal nodes.    Lymphatic: no cervical, axillary or inguinal lymphadenopathy  Breasts: no masses.   Gentiourinary:Pelvic Exam:  Vulva: No external lesions, normal hair distribution, no adenopathy  Vagina: Moist, pink, no abnormal discharge, well rugated, no lesions  Cervix: Pap smear is taken, although it was difficult to open the speculum due to c/o pain.      Assessment and Plan:  Nadira was seen today for physical.    Diagnoses and all orders for this visit:    Need for shingles vaccine  -     HC ZOSTER VACCINE RECOMBINANT ADJUVANTED IM NJX    Pure hypercholesterolemia  -     atorvastatin (LIPITOR) 40 MG tablet; Take 1 tablet (40 mg) by mouth daily Please put on profile- pt just received 90-day supply of cholesterol meds  -     Lipid panel reflex to direct LDL Fasting; Future    Hypopotassemia  -     Comprehensive metabolic panel; Future    Essential hypertension, benign  -     amLODIPine (NORVASC) 5 MG tablet; Take 1 tablet (5 mg) by mouth At Bedtime  -     triamterene-hydrochlorothiazide (MAXZIDE-25) 37.5-25 MG per tablet; Take 1 tablet by mouth  daily  -     Comprehensive metabolic panel; Future    Hypothyroidism, unspecified type  -     levothyroxine (SYNTHROID/LEVOTHROID) 112 MCG tablet; Take 0.5 tab daily  -     TSH; Future    Neuropathic pain  -     gabapentin (NEURONTIN) 300 MG capsule; Take 1-2 capsules by mouth every 8 hours    Chronic left-sided low back pain, with sciatica presence unspecified  -     HYDROcodone-acetaminophen (NORCO) 5-325 MG per tablet; Take 1 tablet by mouth every 6 hours as needed    Benign essential hypertension  -     lisinopril (PRINIVIL/ZESTRIL) 20 MG tablet; Take 1 tablet (20 mg) by mouth daily    Sleep disorder  -     LORazepam (ATIVAN) 2 MG tablet; Take 1 tablet at night for sleep    Irregular heart rate  -     metoprolol succinate (TOPROL-XL) 25 MG 24 hr tablet; Take 1 tablet (25 mg) by mouth daily    Balance disorder  -     PHYSICAL THERAPY REFERRAL; Future    Screening for cervical cancer  -     Pap imaged thin layer screen with HPV - recommended age 30 - 65 years (select HPV order below)  -     HPV High Risk Types DNA Cervical    Knee Pain  She was given a temporary disability parking permit.     Scribe Disclosure:   I, Sandra Sanchez, am serving as a scribe to document services personally performed by MERCEDES Kyle CNP at this visit, based upon the provider's statements to me. All documentation has been reviewed by the aforementioned provider prior to being entered into the official medical record.     Portions of this medical record were completed by a scribe. UPON MY REVIEW AND AUTHENTICATION BY ELECTRONIC SIGNATURE, this confirms (a) I performed the applicable clinical services, and (b) the record is accurate.     Total time spent 45 minutes.  More than 50% of the time spent with Ms. Perez on counseling / coordinating her care.  She has multiple concerns and wanted a pap and pelvic done today.     Matilde LONG CNP

## 2018-10-18 NOTE — PATIENT INSTRUCTIONS
[facility] Medication Refill Request Information:  * Please contact your pharmacy regarding ANY request for medication refills.  ** [facility] Prescription Fax = [phone]  * Please allow 3 business days for routine medication refills.  * Please allow 5 business days for controlled substance medication refills.     [facility] Test Result notification information:  *You will be notified with in 7-10 days of your appointment day regarding the results of your test.  If you are on MyChart you will be notified as soon as the provider has reviewed the results and signed off on them.    [facility]: [phone]

## 2018-10-18 NOTE — NURSING NOTE
Chief Complaint   Patient presents with     Physical     pt here for physical       Jeimy Delgado CMA at 3:32 PM on 10/18/2018.

## 2018-10-18 NOTE — MR AVS SNAPSHOT
After Visit Summary   10/18/2018    Nadira Perez    MRN: [id number]           Patient Information     Date Of Birth          1950        Visit Information        Provider Department      10/18/2018 3:30 PM Matilde Quiñonez, APRN CNP M [facility]        Today's Diagnoses     Need for shingles vaccine    -  1    Pure hypercholesterolemia        Hypopotassemia        Nephrolithiasis        Essential hypertension, benign        Osteoporosis, unspecified osteoporosis type, unspecified pathological fracture presence        Hypothyroidism, unspecified type        Neuropathic pain        Chronic left-sided low back pain, with sciatica presence unspecified        Benign essential hypertension        Sleep disorder        Irregular heart rate        Balance disorder        Screening for cervical cancer          Care Instructions    Primary Care Center Medication Refill Request Information:  * Please contact your pharmacy regarding ANY request for medication refills.  ** [facility] Prescription Fax = [phone]  * Please allow 3 business days for routine medication refills.  * Please allow 5 business days for controlled substance medication refills.     Primary Care Center Test Result notification information:  *You will be notified with in 7-10 days of your appointment day regarding the results of your test.  If you are on MyChart you will be notified as soon as the provider has reviewed the results and signed off on them.    [facility]: [phone]             Follow-ups after your visit        Additional Services     PHYSICAL THERAPY REFERRAL       If you have not heard from the scheduling office within 2 business days, please call [phone] for all locations, with the exception of [place], please call [phone] and [place], please call [phone].    Please be aware that coverage of these services is subject to the terms and limitations of your health insurance  plan.  Call member services at your health plan with any benefit or coverage questions.                  Your next 10 appointments already scheduled     Nov 13, 2018  9:30 AM CST   [facility] with  [facility]   [facility] ([facility])    [address]  [address]  [facility] [address]   [phone]            Nov 13, 2018 10:10 AM CST   (Arrive by 9:55 AM)   Return Visit with Sari Ireland PA-C   [facility] ([facility])    [address]  [address]  [facility] [address]   [phone]            Nov 13, 2018 11:00 AM CST   Infusion 60 with  ONCOLOGY INFUSION, UC 13 ATC   [facility] ([facility])    [address] [place] [address]  [address]  [facility] [address]   [phone]            Dec 21, 2018 12:15 PM CST   RETURN CORNEA with Tyson Ruby MD   [facility] ([facility])    [address]  [facility] [address] [phone]              Future tests that were ordered for you today     Open Future Orders        Priority Expected Expires Ordered    TSH Routine 10/18/2018 10/18/2019 10/18/2018    Lipid panel reflex to direct LDL Fasting Routine 10/18/2018 11/1/2018 10/18/2018    Comprehensive metabolic panel Routine 10/18/2018 11/1/2018 10/18/2018    PHYSICAL THERAPY REFERRAL Routine  10/18/2019 10/18/2018            Who to contact     Please call your clinic at [phone] to:    Ask questions about your health    Make or cancel appointments    Discuss your medicines    Learn about your test results    Speak to your doctor            Additional Information About Your Visit        MyChart Information     [employer] gives you secure access to your electronic health record. If you see a primary care provider, you can also send messages to your care team and make appointments. If  "you have questions, please call your primary care clinic.  If you do not have a primary care provider, please call [phone] and they will assist you.      [employer] is an electronic gateway that provides easy, online access to your medical records. With [employer], you can request a clinic appointment, read your test results, renew a prescription or communicate with your care team.     To access your existing account, please contact your [facility] or call [phone] for assistance.        Care EveryWhere ID     This is your Care EveryWhere ID. This could be used by other organizations to access your [place] medical records  [id number]        Your Vitals Were     Pulse Temperature Height Pulse Oximetry BMI (Body Mass Index)       82 99.2  F (37.3  C) (Oral) 1.704 m (5' 7.09\") 99% 30.73 kg/m2        Blood Pressure from Last 3 Encounters:   10/18/18 142/88   10/16/18 137/88   07/24/18 (P) 134/85    Weight from Last 3 Encounters:   10/16/18 89.2 kg (196 lb 11.2 oz)   07/11/18 97.5 kg (215 lb)   06/28/18 97.5 kg (215 lb)              We Performed the Following     HC ZOSTER VACCINE RECOMBINANT ADJUVANTED IM NJX     HPV High Risk Types DNA Cervical     Pap imaged thin layer screen with HPV - recommended age 30 - 65 years (select HPV order below)          Where to get your medicines      These medications were sent to [place] [id number] [facility] [address]  [facility]  [address]  [facility] [address]     Phone:  [phone]     amLODIPine 5 MG tablet    atorvastatin 40 MG tablet    gabapentin 300 MG capsule    levothyroxine 112 MCG tablet    lisinopril 20 MG tablet    metoprolol succinate 25 MG 24 hr tablet    triamterene-hydrochlorothiazide 37.5-25 MG per tablet         Some of these will need a paper prescription and others can be bought over the counter.  Ask your nurse if you have questions.     Bring a paper prescription for each of these medications     " HYDROcodone-acetaminophen 5-325 MG per tablet    LORazepam 2 MG tablet         Information about OPIOIDS     PRESCRIPTION OPIOIDS: WHAT YOU NEED TO KNOW   We gave you an opioid (narcotic) pain medicine. It is important to manage your pain, but opioids are not always the best choice. You should first try all the other options your care team gave you. Take this medicine for as short a time (and as few doses) as possible.    Some activities can increase your pain, such as bandage changes or therapy sessions. It may help to take your pain medicine 30 to 60 minutes before these activities. Reduce your stress by getting enough sleep, working on hobbies you enjoy and practicing relaxation or meditation. Talk to your care team about ways to manage your pain beyond prescription opioids.    These medicines have risks:    DO NOT drive when on new or higher doses of pain medicine. These medicines can affect your alertness and reaction times, and you could be arrested for driving under the influence (DUI). If you need to use opioids long-term, talk to your care team about driving.    DO NOT operate heavy machinery    DO NOT do any other dangerous activities while taking these medicines.    DO NOT drink any alcohol while taking these medicines.     If the opioid prescribed includes acetaminophen, DO NOT take with any other medicines that contain acetaminophen. Read all labels carefully. Look for the word  acetaminophen  or  Tylenol.  Ask your pharmacist if you have questions or are unsure.    You can get addicted to pain medicines, especially if you have a history of addiction (chemical, alcohol or substance dependence). Talk to your care team about ways to reduce this risk.    All opioids tend to cause constipation. Drink plenty of water and eat foods that have a lot of fiber, such as fruits, vegetables, prune juice, apple juice and high-fiber cereal. Take a laxative (Miralax, milk of magnesia, Colace, Senna) if you don t move  your bowels at least every other day. Other side effects include upset stomach, sleepiness, dizziness, throwing up, tolerance (needing more of the medicine to have the same effect), physical dependence and slowed breathing.    Store your pills in a secure place, locked if possible. We will not replace any lost or stolen medicine. If you don t finish your medicine, please throw away (dispose) as directed by your pharmacist. The [place] Pollution Control Agency has more information about safe disposal: [email or web address]         Primary Care Provider Office Phone # Fax #    Matilde Quiñonez, APRN -[phone]       [address] [place] [address]  [facility] [address]        Equal Access to Services     EDEL LAMA : Sonali avalos Sosukhdev, waaxda luqadaha, qaybta kaalmada adehariyabaltazar, cierra seals . So [facility] [phone].    ATENCIÓN: Si habla español, tiene a davis disposición servicios gratuitos de asistencia lingüística. Llame al [phone].    We comply with applicable federal civil rights laws and [place] laws. We do not discriminate on the basis of race, color, national origin, age, disability, sex, sexual orientation, or gender identity.            Thank you!     Thank you for choosing [facility]  for your care. Our goal is always to provide you with excellent care. Hearing back from our patients is one way we can continue to improve our services. Please take a few minutes to complete the written survey that you may receive in the mail after your visit with us. Thank you!             Your Updated Medication List - Protect others around you: Learn how to safely use, store and throw away your medicines at www.disposemymeds.org.          This list is accurate as of 10/18/18  4:34 PM.  Always use your most recent med list.                   Brand Name Dispense Instructions for use Diagnosis     amLODIPine 5 MG tablet    NORVASC    90 tablet    Take 1 tablet (5 mg) by mouth At Bedtime    Essential hypertension, benign       anastrozole 1 MG tablet    ARIMIDEX    90 tablet    Take 1 tablet (1 mg) by mouth daily    Malignant neoplasm of left breast in female, estrogen receptor positive, unspecified site of breast (H)       APAP 500 PO      Take 1,000 mg by mouth 2 times daily        atorvastatin 40 MG tablet    LIPITOR    90 tablet    Take 1 tablet (40 mg) by mouth daily Please put on profile- pt just received 90-day supply of cholesterol meds    Pure hypercholesterolemia       * cholecalciferol 1000 UNIT tablet    vitamin D3    100 tablet    Take 1 tablet (1,000 Units) by mouth daily    Osteoporosis       * cholecalciferol 5000 units Caps capsule    vitamin D3    120 capsule    Take 1 capsule (5,000 Units) by mouth daily    Vaginal dryness       CoQ10 200 MG Caps      Take 1 capsule by mouth daily        * diclofenac 1 % Gel topical gel    VOLTAREN    100 g    Apply 4 grams to knees or 2 grams to hands four times daily using enclosed dosing card.    Primary osteoarthritis of both wrists       * VOLTAREN 1 % Gel topical gel   Generic drug:  diclofenac     100 g    Apply 4 grams to knees or 2 grams to hands four times daily using enclosed dosing card.    Right knee pain, unspecified chronicity       gabapentin 300 MG capsule    NEURONTIN    540 capsule    Take 1-2 capsules by mouth every 8 hours    Neuropathic pain       glucosamine-chondroitin 500-400 MG Caps per capsule      Take 2 capsules by mouth 2 times daily        HYDROcodone-acetaminophen 5-325 MG per tablet    NORCO    40 tablet    Take 1 tablet by mouth every 6 hours as needed    Chronic left-sided low back pain, with sciatica presence unspecified       ketoconazole 2 % shampoo    NIZORAL    120 mL    Apply topically daily as needed for itching or irritation    Dermatitis, seborrheic       levothyroxine 112 MCG tablet    SYNTHROID/LEVOTHROID    45  tablet    Take 0.5 tab daily    Hypothyroidism, unspecified type       lisinopril 20 MG tablet    PRINIVIL/ZESTRIL    30 tablet    Take 1 tablet (20 mg) by mouth daily    Benign essential hypertension       LORazepam 2 MG tablet    ATIVAN    30 tablet    Take 1 tablet at night for sleep    Sleep disorder       metoprolol succinate 25 MG 24 hr tablet    TOPROL-XL    90 tablet    Take 1 tablet (25 mg) by mouth daily    Irregular heart rate       Multi-vitamin Tabs tablet   Generic drug:  multivitamin, therapeutic with minerals      Take 1 tablet by mouth daily.        OMEGA-3 FISH OIL PO      Take 1 capsule by mouth daily        ondansetron 4 MG ODT tab    ZOFRAN-ODT    90 tablet    1-2 tabs po prn every 6 hours    Nausea       REPHRESH Gel     14 Box    Place 2 g vaginally every 3 days    Urinary tract infection, site not specified       terbinafine 1 % cream    lamISIL AT    24 g    Apply topically 2 times daily For fungal infection not resolved with other antifungals (e.g. Clotrimazole)    Tinea pedis of both feet       triamcinolone 0.1 % ointment    KENALOG    30 g    Apply topically 2 times daily    Eczema, unspecified type       triamterene-hydrochlorothiazide 37.5-25 MG per tablet    MAXZIDE-25    90 tablet    Take 1 tablet by mouth daily    Essential hypertension, benign       * Notice:  This list has 4 medication(s) that are the same as other medications prescribed for you. Read the directions carefully, and ask your doctor or other care provider to review them with you.

## 2018-10-22 LAB
COPATH REPORT: NORMAL
PAP: NORMAL

## 2018-10-22 NOTE — PATIENT INSTRUCTIONS
Recommendations from today's MTM visit:                                                        1. Let's increase amlodipine to 10 mg daily to see if that helps bring blood pressure closer to 130/80 mmHg.  I hope your visit with Matilde went well!  I'll be in touch in early November to schedule a blood pressure follow-up.     2. Continue working to reduce your use of ibuprofen.  Long-term use can increase risk for heart events and can harm your kidneys.  I hope we can work together to come up with a better plan for your pain!    Next MTM visit:  I'll be in touch via [employer] in early November to schedule a blood pressure follow-up.     To schedule another MTM appointment, please call the clinic directly or you may call the MTM scheduling line at [phone] or toll-free at [phone].     My Clinical Pharmacist's contact information:                                                      It was a pleasure seeing you today!  Please feel free to contact me with any questions or concerns you have.      Kisha Heller, Pharm.D., [facility]  Medication Therapy Management Pharmacist  Page/VM:  [phone]    You may receive a survey about the MTM services you received.  I would appreciate your feedback to help me serve you better in the future. Please fill it out and return it when you can. Your comments will be anonymous.

## 2018-10-23 LAB
FINAL DIAGNOSIS: NORMAL
HPV HR 12 DNA CVX QL NAA+PROBE: NEGATIVE
HPV16 DNA SPEC QL NAA+PROBE: NEGATIVE
HPV18 DNA SPEC QL NAA+PROBE: NEGATIVE
SPECIMEN DESCRIPTION: NORMAL
SPECIMEN SOURCE CVX/VAG CYTO: NORMAL

## 2018-10-26 ENCOUNTER — MYC MEDICAL ADVICE (OUTPATIENT)
Dept: INTERNAL MEDICINE | Facility: CLINIC | Age: 68
End: 2018-10-26

## 2018-10-30 NOTE — TELEPHONE ENCOUNTER
Pharmacy called and they have medication on profile - too soon to fill per insurance. They will call the patient.      Kathleen M Doege RN

## 2018-11-08 DIAGNOSIS — C50.912 MALIGNANT NEOPLASM OF LEFT BREAST (H): Primary | ICD-10-CM

## 2018-11-12 DIAGNOSIS — C50.919 MALIGNANT NEOPLASM OF BREAST IN FEMALE, ESTROGEN RECEPTOR POSITIVE, UNSPECIFIED LATERALITY, UNSPECIFIED SITE OF BREAST (H): Primary | ICD-10-CM

## 2018-11-12 DIAGNOSIS — Z17.0 MALIGNANT NEOPLASM OF BREAST IN FEMALE, ESTROGEN RECEPTOR POSITIVE, UNSPECIFIED LATERALITY, UNSPECIFIED SITE OF BREAST (H): Primary | ICD-10-CM

## 2018-11-13 ENCOUNTER — ONCOLOGY VISIT (OUTPATIENT)
Dept: ONCOLOGY | Facility: CLINIC | Age: 68
End: 2018-11-13
Attending: PHYSICIAN ASSISTANT
Payer: MEDICARE

## 2018-11-13 ENCOUNTER — OFFICE VISIT (OUTPATIENT)
Dept: ORTHOPEDICS | Facility: CLINIC | Age: 68
End: 2018-11-13
Payer: MEDICARE

## 2018-11-13 ENCOUNTER — INFUSION THERAPY VISIT (OUTPATIENT)
Dept: ONCOLOGY | Facility: CLINIC | Age: 68
End: 2018-11-13
Attending: INTERNAL MEDICINE
Payer: MEDICARE

## 2018-11-13 ENCOUNTER — APPOINTMENT (OUTPATIENT)
Dept: LAB | Facility: CLINIC | Age: 68
End: 2018-11-13
Attending: PHYSICIAN ASSISTANT
Payer: MEDICARE

## 2018-11-13 VITALS
OXYGEN SATURATION: 97 % | WEIGHT: 193.7 LBS | DIASTOLIC BLOOD PRESSURE: 85 MMHG | TEMPERATURE: 98.7 F | HEIGHT: 67 IN | BODY MASS INDEX: 30.4 KG/M2 | HEART RATE: 78 BPM | RESPIRATION RATE: 16 BRPM | SYSTOLIC BLOOD PRESSURE: 137 MMHG

## 2018-11-13 VITALS
WEIGHT: 193 LBS | SYSTOLIC BLOOD PRESSURE: 137 MMHG | DIASTOLIC BLOOD PRESSURE: 85 MMHG | HEIGHT: 67 IN | BODY MASS INDEX: 30.29 KG/M2

## 2018-11-13 DIAGNOSIS — M85.80 OSTEOPENIA, UNSPECIFIED LOCATION: Primary | ICD-10-CM

## 2018-11-13 DIAGNOSIS — Z86.39 HISTORY OF VITAMIN D DEFICIENCY: Primary | ICD-10-CM

## 2018-11-13 DIAGNOSIS — G89.29 CHRONIC PAIN OF RIGHT KNEE: ICD-10-CM

## 2018-11-13 DIAGNOSIS — E03.9 HYPOTHYROIDISM, UNSPECIFIED TYPE: ICD-10-CM

## 2018-11-13 DIAGNOSIS — Z79.811 LONG TERM CURRENT USE OF AROMATASE INHIBITOR: ICD-10-CM

## 2018-11-13 DIAGNOSIS — M25.561 CHRONIC PAIN OF RIGHT KNEE: ICD-10-CM

## 2018-11-13 DIAGNOSIS — E78.00 PURE HYPERCHOLESTEROLEMIA: ICD-10-CM

## 2018-11-13 DIAGNOSIS — M84.469G INSUFFICIENCY FRACTURE OF TIBIA, WITH DELAYED HEALING, SUBSEQUENT ENCOUNTER: Primary | ICD-10-CM

## 2018-11-13 DIAGNOSIS — R73.09 ELEVATED HEMOGLOBIN A1C: Primary | ICD-10-CM

## 2018-11-13 DIAGNOSIS — Z17.0 MALIGNANT NEOPLASM OF LEFT BREAST IN FEMALE, ESTROGEN RECEPTOR POSITIVE, UNSPECIFIED SITE OF BREAST (H): ICD-10-CM

## 2018-11-13 DIAGNOSIS — Z79.899 OTHER LONG TERM (CURRENT) DRUG THERAPY: ICD-10-CM

## 2018-11-13 DIAGNOSIS — Z17.0 MALIGNANT NEOPLASM OF BREAST IN FEMALE, ESTROGEN RECEPTOR POSITIVE, UNSPECIFIED LATERALITY, UNSPECIFIED SITE OF BREAST (H): ICD-10-CM

## 2018-11-13 DIAGNOSIS — Z79.811 AROMATASE INHIBITOR USE: ICD-10-CM

## 2018-11-13 DIAGNOSIS — E87.6 HYPOPOTASSEMIA: ICD-10-CM

## 2018-11-13 DIAGNOSIS — C50.912 MALIGNANT NEOPLASM OF LEFT BREAST IN FEMALE, ESTROGEN RECEPTOR POSITIVE, UNSPECIFIED SITE OF BREAST (H): ICD-10-CM

## 2018-11-13 DIAGNOSIS — C50.919 MALIGNANT NEOPLASM OF BREAST IN FEMALE, ESTROGEN RECEPTOR POSITIVE, UNSPECIFIED LATERALITY, UNSPECIFIED SITE OF BREAST (H): ICD-10-CM

## 2018-11-13 LAB
ALBUMIN SERPL-MCNC: 4.2 G/DL (ref 3.4–5)
ALP SERPL-CCNC: 80 U/L (ref 40–150)
ALT SERPL W P-5'-P-CCNC: 28 U/L (ref 0–50)
ANION GAP SERPL CALCULATED.3IONS-SCNC: 7 MMOL/L (ref 3–14)
AST SERPL W P-5'-P-CCNC: 22 U/L (ref 0–45)
BASOPHILS # BLD AUTO: 0.1 10E9/L (ref 0–0.2)
BASOPHILS NFR BLD AUTO: 0.6 %
BILIRUB SERPL-MCNC: 0.5 MG/DL (ref 0.2–1.3)
BUN SERPL-MCNC: 19 MG/DL (ref 7–30)
CALCIUM SERPL-MCNC: 9.3 MG/DL (ref 8.5–10.1)
CHLORIDE SERPL-SCNC: 103 MMOL/L (ref 94–109)
CHOLEST SERPL-MCNC: 245 MG/DL
CO2 SERPL-SCNC: 28 MMOL/L (ref 20–32)
CREAT SERPL-MCNC: 0.73 MG/DL (ref 0.52–1.04)
DEPRECATED CALCIDIOL+CALCIFEROL SERPL-MC: 68 UG/L (ref 20–75)
DIFFERENTIAL METHOD BLD: ABNORMAL
EOSINOPHIL # BLD AUTO: 0.6 10E9/L (ref 0–0.7)
EOSINOPHIL NFR BLD AUTO: 7.5 %
ERYTHROCYTE [DISTWIDTH] IN BLOOD BY AUTOMATED COUNT: 15.4 % (ref 10–15)
GFR SERPL CREATININE-BSD FRML MDRD: 80 ML/MIN/1.7M2
GLUCOSE SERPL-MCNC: 106 MG/DL (ref 70–99)
HBA1C MFR BLD: 6.3 % (ref 0–5.6)
HCT VFR BLD AUTO: 39.2 % (ref 35–47)
HDLC SERPL-MCNC: 58 MG/DL
HGB BLD-MCNC: 12.5 G/DL (ref 11.7–15.7)
IMM GRANULOCYTES # BLD: 0.1 10E9/L (ref 0–0.4)
IMM GRANULOCYTES NFR BLD: 0.6 %
LDLC SERPL CALC-MCNC: 135 MG/DL
LYMPHOCYTES # BLD AUTO: 1.4 10E9/L (ref 0.8–5.3)
LYMPHOCYTES NFR BLD AUTO: 17.8 %
MCH RBC QN AUTO: 27.3 PG (ref 26.5–33)
MCHC RBC AUTO-ENTMCNC: 31.9 G/DL (ref 31.5–36.5)
MCV RBC AUTO: 86 FL (ref 78–100)
MONOCYTES # BLD AUTO: 0.6 10E9/L (ref 0–1.3)
MONOCYTES NFR BLD AUTO: 7.3 %
NEUTROPHILS # BLD AUTO: 5.1 10E9/L (ref 1.6–8.3)
NEUTROPHILS NFR BLD AUTO: 66.2 %
NONHDLC SERPL-MCNC: 186 MG/DL
NRBC # BLD AUTO: 0 10*3/UL
NRBC BLD AUTO-RTO: 0 /100
PLATELET # BLD AUTO: 284 10E9/L (ref 150–450)
POTASSIUM SERPL-SCNC: 3.1 MMOL/L (ref 3.4–5.3)
PROT SERPL-MCNC: 7.8 G/DL (ref 6.8–8.8)
RBC # BLD AUTO: 4.58 10E12/L (ref 3.8–5.2)
SODIUM SERPL-SCNC: 138 MMOL/L (ref 133–144)
TRIGL SERPL-MCNC: 257 MG/DL
TSH SERPL DL<=0.005 MIU/L-ACNC: 2.06 MU/L (ref 0.4–4)
WBC # BLD AUTO: 7.8 10E9/L (ref 4–11)

## 2018-11-13 PROCEDURE — 25000128 H RX IP 250 OP 636: Mod: ZF | Performed by: PHYSICIAN ASSISTANT

## 2018-11-13 PROCEDURE — 80053 COMPREHEN METABOLIC PANEL: CPT | Performed by: PHYSICIAN ASSISTANT

## 2018-11-13 PROCEDURE — 83036 HEMOGLOBIN GLYCOSYLATED A1C: CPT | Performed by: PHYSICIAN ASSISTANT

## 2018-11-13 PROCEDURE — 96365 THER/PROPH/DIAG IV INF INIT: CPT

## 2018-11-13 PROCEDURE — 82306 VITAMIN D 25 HYDROXY: CPT | Performed by: PHYSICIAN ASSISTANT

## 2018-11-13 PROCEDURE — 84443 ASSAY THYROID STIM HORMONE: CPT | Performed by: PHYSICIAN ASSISTANT

## 2018-11-13 PROCEDURE — 99214 OFFICE O/P EST MOD 30 MIN: CPT | Mod: ZP | Performed by: PHYSICIAN ASSISTANT

## 2018-11-13 PROCEDURE — 85025 COMPLETE CBC W/AUTO DIFF WBC: CPT | Performed by: PHYSICIAN ASSISTANT

## 2018-11-13 PROCEDURE — 96413 CHEMO IV INFUSION 1 HR: CPT

## 2018-11-13 PROCEDURE — 80061 LIPID PANEL: CPT | Performed by: PHYSICIAN ASSISTANT

## 2018-11-13 RX ORDER — ZOLEDRONIC ACID 0.04 MG/ML
4 INJECTION, SOLUTION INTRAVENOUS ONCE
Status: COMPLETED | OUTPATIENT
Start: 2018-11-13 | End: 2018-11-13

## 2018-11-13 RX ORDER — ZOLEDRONIC ACID 0.04 MG/ML
4 INJECTION, SOLUTION INTRAVENOUS ONCE
Status: CANCELLED | OUTPATIENT
Start: 2018-11-13

## 2018-11-13 RX ORDER — POTASSIUM CHLORIDE 1500 MG/1
20 TABLET, EXTENDED RELEASE ORAL 2 TIMES DAILY
Qty: 8 TABLET | Refills: 3 | Status: SHIPPED | OUTPATIENT
Start: 2018-11-13 | End: 2019-06-24

## 2018-11-13 RX ADMIN — SODIUM CHLORIDE 250 ML: 9 INJECTION, SOLUTION INTRAVENOUS at 11:40

## 2018-11-13 RX ADMIN — ZOLEDRONIC ACID 4 MG: 0.04 INJECTION, SOLUTION INTRAVENOUS at 11:40

## 2018-11-13 ASSESSMENT — PAIN SCALES - GENERAL: PAINLEVEL: MODERATE PAIN (5)

## 2018-11-13 NOTE — PATIENT INSTRUCTIONS
Contact Numbers    [facility] Main Line: [phone]  [facility] Triage and after hours / weekends / holidays:  [phone]      Please call the triage or after hours line if you experience a temperature greater than or equal to 100.5, shaking chills, have uncontrolled nausea, vomiting and/or diarrhea, dizziness, shortness of breath, chest pain, bleeding, unexplained bruising, or if you have any other new/concerning symptoms, questions or concerns.      If you are having any concerning symptoms or wish to speak to a provider before your next infusion visit, please call your care coordinator or triage to notify them so we can adequately serve you.     If you need a refill on a narcotic prescription or other medication, please call before your infusion appointment.                   November 2018 Sunday Monday Tuesday Wednesday Thursday Friday Saturday                       1     2     3       4     5     6     7     8     9     10       11     12     13     [facility] DRAW    9:30 AM   (15 min.)    [facility] DRAW   [facility] Lab Draw     [facility] RETURN    9:55 AM   (50 min.)   Sari Ireland PA-C   [facility]     [facility] ONC INFUSION 60   11:00 AM   (60 min.)   [facility] ONCOLOGY INFUSION   [facility] 14     15     16     17       18     19     20     21     22     23     24       25     26     27     28     29 30 December 2018 Sunday Monday Tuesday Wednesday Thursday Friday Saturday                                 1       2     3     4     5     6     7     8       9     10     11     12     13     14     15       16     17     18     19     20     [facility] RETURN HAND    9:35 AM   (10 min.)   Lien Prajapati MD   [facility] 21     [facility] RETURN CORNEA   12:15 PM   (15 min.)   Tyson Ruby MD   [facility] 22       23     24     25     26     27     28     29       30     31                                          Recent  Results (from the past 24 hour(s))   CBC with platelets differential    Collection Time: 11/13/18  9:56 AM   Result Value Ref Range    WBC 7.8 4.0 - 11.0 10e9/L    RBC Count 4.58 3.8 - 5.2 10e12/L    Hemoglobin 12.5 11.7 - 15.7 g/dL    Hematocrit 39.2 35.0 - 47.0 %    MCV 86 78 - 100 fl    MCH 27.3 26.5 - 33.0 pg    MCHC 31.9 31.5 - 36.5 g/dL    RDW 15.4 (H) 10.0 - 15.0 %    Platelet Count 284 150 - 450 10e9/L    Diff Method Automated Method     % Neutrophils 66.2 %    % Lymphocytes 17.8 %    % Monocytes 7.3 %    % Eosinophils 7.5 %    % Basophils 0.6 %    % Immature Granulocytes 0.6 %    Nucleated RBCs 0 0 /100    Absolute Neutrophil 5.1 1.6 - 8.3 10e9/L    Absolute Lymphocytes 1.4 0.8 - 5.3 10e9/L    Absolute Monocytes 0.6 0.0 - 1.3 10e9/L    Absolute Eosinophils 0.6 0.0 - 0.7 10e9/L    Absolute Basophils 0.1 0.0 - 0.2 10e9/L    Abs Immature Granulocytes 0.1 0 - 0.4 10e9/L    Absolute Nucleated RBC 0.0    Comprehensive metabolic panel    Collection Time: 11/13/18  9:56 AM   Result Value Ref Range    Sodium 138 133 - 144 mmol/L    Potassium 3.1 (L) 3.4 - 5.3 mmol/L    Chloride 103 94 - 109 mmol/L    Carbon Dioxide 28 20 - 32 mmol/L    Anion Gap 7 3 - 14 mmol/L    Glucose 106 (H) 70 - 99 mg/dL    Urea Nitrogen 19 7 - 30 mg/dL    Creatinine 0.73 0.52 - 1.04 mg/dL    GFR Estimate 80 >60 mL/min/1.7m2    GFR Estimate If Black >90 >60 mL/min/1.7m2    Calcium 9.3 8.5 - 10.1 mg/dL    Bilirubin Total 0.5 0.2 - 1.3 mg/dL    Albumin 4.2 3.4 - 5.0 g/dL    Protein Total 7.8 6.8 - 8.8 g/dL    Alkaline Phosphatase 80 40 - 150 U/L    ALT 28 0 - 50 U/L    AST 22 0 - 45 U/L   TSH    Collection Time: 11/13/18  9:56 AM   Result Value Ref Range    TSH 2.06 0.40 - 4.00 mU/L   Lipid panel reflex to direct LDL Fasting    Collection Time: 11/13/18  9:56 AM   Result Value Ref Range    Cholesterol 245 (H) <200 mg/dL    Triglycerides 257 (H) <150 mg/dL    HDL Cholesterol 58 >49 mg/dL    LDL Cholesterol Calculated 135 (H) <100 mg/dL    Non HDL  Cholesterol 186 (H) <130 mg/dL   Hemoglobin A1c    Collection Time: 11/13/18  9:56 AM   Result Value Ref Range    Hemoglobin A1C 6.3 (H) 0 - 5.6 %

## 2018-11-13 NOTE — MR AVS SNAPSHOT
After Visit Summary   11/13/2018    Nadira Perez    MRN: [id number]           Patient Information     Date Of Birth          1950        Visit Information        Provider Department      11/13/2018 10:10 AM Sari Ireland PA-C [facility]        Today's Diagnoses     History of vitamin D deficiency    -  1    Malignant neoplasm of breast in female, estrogen receptor positive, unspecified laterality, unspecified site of breast (H)        Hypothyroidism, unspecified type        Pure hypercholesterolemia        Other long term (current) drug therapy         Long term current use of aromatase inhibitor            Follow-ups after your visit        Your next 10 appointments already scheduled     Nov 13, 2018 11:00 AM CST   Infusion 60 with UC ONCOLOGY INFUSION, UC 13 ATC   [facility] ([facility])    [address]  [address]  [facility] [id number] [phone]            Dec 20, 2018  9:50 AM CST   (Arrive by 9:35 AM)   RETURN HAND with Lien Prajapati MD   [facility] ([facility])    [address] [place] [address]  4th Floor  [facility] [id number] [phone]            Dec 21, 2018 12:15 PM CST   RETURN CORNEA with Tyson Ruby MD   [facility] ([facility])    [address]  [facility] [id number] [phone]              Who to contact     If you have questions or need follow up information about today's clinic visit or your schedule please contact [facility] directly at [phone].  Normal or non-critical lab and imaging results will be communicated to you by MyChart, letter or phone within 4 business days after the clinic has received the results. If you do not hear from us within 7 days, please contact the clinic through MyChart or phone. If you have a critical or  "abnormal lab result, we will notify you by phone as soon as possible.  Submit refill requests through [employer] or call your pharmacy and they will forward the refill request to us. Please allow 3 business days for your refill to be completed.          Additional Information About Your Visit        Care Team Connecthart Information     [employer] gives you secure access to your electronic health record. If you see a primary care provider, you can also send messages to your care team and make appointments. If you have questions, please call your primary care clinic.  If you do not have a primary care provider, please call [phone] and they will assist you.        Care EveryWhere ID     This is your Care EveryWhere ID. This could be used by other organizations to access your [facility] medical records  [id number]        Your Vitals Were     Pulse Temperature Respirations Height Pulse Oximetry BMI (Body Mass Index)    78 98.7  F (37.1  C) (Oral) 16 1.704 m (5' 7.09\") 97% 30.26 kg/m2       Blood Pressure from Last 3 Encounters:   11/13/18 137/85   10/18/18 142/88   10/16/18 137/88    Weight from Last 3 Encounters:   11/13/18 87.9 kg (193 lb 11.2 oz)   10/16/18 89.2 kg (196 lb 11.2 oz)   07/11/18 97.5 kg (215 lb)              We Performed the Following     CBC with platelets differential     Comprehensive metabolic panel     Hemoglobin A1c     Lipid panel reflex to direct LDL Fasting     TSH     Vitamin D Deficiency          Today's Medication Changes          These changes are accurate as of 11/13/18 10:53 AM.  If you have any questions, ask your nurse or doctor.               These medicines have changed or have updated prescriptions.        Dose/Directions    amLODIPine 5 MG tablet   Commonly known as:  NORVASC   This may have changed:  how much to take   Used for:  Essential hypertension, benign        Dose:  5 mg   Take 1 tablet (5 mg) by mouth At Bedtime   Quantity:  90 tablet   Refills:  3       cholecalciferol 5000 units (125 " mcg) Caps capsule   Commonly known as:  vitamin D3   This may have changed:  additional instructions   Used for:  Vaginal dryness        Dose:  5000 Units   Take 1 capsule (5,000 Units) by mouth daily   Quantity:  120 capsule   Refills:  3       lisinopril 20 MG tablet   Commonly known as:  PRINIVIL/ZESTRIL   This may have changed:  how much to take   Used for:  Benign essential hypertension        Dose:  20 mg   Take 1 tablet (20 mg) by mouth daily   Quantity:  30 tablet   Refills:  3                Primary Care Provider Office Phone # Fax #    Matilde Quiñonez, APRN [phone]       [address] [place] [address]  [facility] [address]        Equal Access to Services     EDEL LAMA : Hadii aad ku hadasho Sosukhdev, waaxda luqadaha, qaybta kaalmada sevenyada, cierra seals . So [facility] [phone].    ATENCIÓN: Si habla español, tiene a davis disposición servicios gratuitos de asistencia lingüística. Ll[facility] [phone].    We comply with applicable federal civil rights laws and [place] laws. We do not discriminate on the basis of race, color, national origin, age, disability, sex, sexual orientation, or gender identity.            Thank you!     Thank you for choosing [facility]  for your care. Our goal is always to provide you with excellent care. Hearing back from our patients is one way we can continue to improve our services. Please take a few minutes to complete the written survey that you may receive in the mail after your visit with us. Thank you!             Your Updated Medication List - Protect others around you: Learn how to safely use, store and throw away your medicines at www.disposemymeds.org.          This list is accurate as of 11/13/18 10:53 AM.  Always use your most recent med list.                   Brand Name Dispense Instructions for use Diagnosis    amLODIPine 5 MG tablet    NORVASC    90 tablet    Take 1 tablet (5 mg) by mouth At  Bedtime    Essential hypertension, benign       anastrozole 1 MG tablet    ARIMIDEX    90 tablet    Take 1 tablet (1 mg) by mouth daily    Malignant neoplasm of left breast in female, estrogen receptor positive, unspecified site of breast (H)       APAP 500 PO      Take 1,000 mg by mouth 2 times daily        atorvastatin 40 MG tablet    LIPITOR    90 tablet    Take 1 tablet (40 mg) by mouth daily Please put on profile- pt just received 90-day supply of cholesterol meds    Pure hypercholesterolemia       cholecalciferol 5000 units (125 mcg) Caps capsule    vitamin D3    120 capsule    Take 1 capsule (5,000 Units) by mouth daily    Vaginal dryness       CoQ10 200 MG Caps      Take 1 capsule by mouth daily        * diclofenac 1 % Gel topical gel    VOLTAREN    100 g    Apply 4 grams to knees or 2 grams to hands four times daily using enclosed dosing card.    Primary osteoarthritis of both wrists       * VOLTAREN 1 % Gel topical gel   Generic drug:  diclofenac     100 g    Apply 4 grams to knees or 2 grams to hands four times daily using enclosed dosing card.    Right knee pain, unspecified chronicity       gabapentin 300 MG capsule    NEURONTIN    540 capsule    Take 1-2 capsules by mouth every 8 hours    Neuropathic pain       glucosamine-chondroitin 500-400 MG Caps per capsule      Take 1 capsule by mouth 2 times daily        HYDROcodone-acetaminophen 5-325 MG per tablet    NORCO    40 tablet    Take 1 tablet by mouth every 6 hours as needed    Chronic left-sided low back pain, with sciatica presence unspecified       ketoconazole 2 % shampoo    NIZORAL    120 mL    Apply topically daily as needed for itching or irritation    Dermatitis, seborrheic       levothyroxine 112 MCG tablet    SYNTHROID/LEVOTHROID    45 tablet    Take 0.5 tab daily    Hypothyroidism, unspecified type       lisinopril 20 MG tablet    PRINIVIL/ZESTRIL    30 tablet    Take 1 tablet (20 mg) by mouth daily    Benign essential hypertension        LORazepam 2 MG tablet    ATIVAN    30 tablet    Take 1 tablet at night for sleep    Sleep disorder       metoprolol succinate 25 MG 24 hr tablet    TOPROL-XL    90 tablet    Take 1 tablet (25 mg) by mouth daily    Irregular heart rate       Multi-vitamin Tabs tablet   Generic drug:  multivitamin, therapeutic with minerals      Take 1 tablet by mouth daily.        OMEGA-3 FISH OIL PO      Take 1 capsule by mouth daily        ondansetron 4 MG ODT tab    ZOFRAN-ODT    90 tablet    1-2 tabs po prn every 6 hours    Nausea       REPHRESH Gel     14 Box    Place 2 g vaginally every 3 days    Urinary tract infection, site not specified       terbinafine 1 % cream    lamISIL AT    24 g    Apply topically 2 times daily For fungal infection not resolved with other antifungals (e.g. Clotrimazole)    Tinea pedis of both feet       triamcinolone 0.1 % ointment    KENALOG    30 g    Apply topically 2 times daily    Eczema, unspecified type       triamterene-hydrochlorothiazide 37.5-25 MG per tablet    MAXZIDE-25    90 tablet    Take 1 tablet by mouth daily    Essential hypertension, benign       * Notice:  This list has 2 medication(s) that are the same as other medications prescribed for you. Read the directions carefully, and ask your doctor or other care provider to review them with you.

## 2018-11-13 NOTE — PROGRESS NOTES
"[facility]  Orthopedics  Willis Borjas, DO  2018     Name: Nadira Perez  MRN: [id number]  Age: 68 year old  : 1950  Referring provider: Referred Self     Chief Complaint: RECHECK of the Right Knee    History of Present Illness:   Nadira Perez is a 68 year old female with a history of osteopenia and hyperparathyroidism who presents today for follow-up regarding right knee pain. I last evaluated her on 18, at which time she was diagnosed with right knee pain, tears of medial meniscus of right knee, and bone contusion of medial femoral condyle, possibly insufficiency fracture w/o collapse. She was also placed in a medial  brace. Since last time, she notes that the pain has been improving, but she continues to endorse pain at the anterior aspect of the right knee. Pain is alleviated when taking ibuprofen. Still using ibuprofen daily.  Brace may be helping but she feels it is bulky and a nuisance.      Pain is exacerbated with weightbearing. Pain is rated a 7 out of 10 this morning. Of note, she states that she has had right knee meniscus tears since last time.     Review of Systems:   A 10-point review of systems was obtained and is negative except for as noted in the HPI.     Physical Examination:  Blood pressure 137/85, height 1.702 m (5' 7\"), weight 87.5 kg (193 lb), not currently breastfeeding.    General  - normal appearance, in no obvious distress  CV  - normal popliteal pulse  Pulm  - normal respiratory pattern, non-labored  Musculoskeletal - right knee  - stance: normal gait without limp, normal single leg squat, no obvious leg length discrepancy  - inspection: no swelling or effusion, normal bone and joint alignment, no obvious deformity  - palpation: tenderness at the medial tibial plateau, tender at the medial femoral condyle, medial and lateral patellar facette, medial greater than lateral.   - ROM: 135 degrees flexion, -5 degrees extension, not painful, normal actively " and passively compared to contralateral  - strength: 5/5 in flexion, 5/5 in extension  - special tests:  (-) Lachman  (-) anterior drawer  (-) posterior drawer  (-) pivot shift  (-) Mackenzie  (-) Thessaly  (-) varus at 0 and 30 degrees flexion  (-) valgus at 0 and 30 degrees flexion  (-) Alex s compression test  (-) patellar apprehension  Neuro  - no sensory or motor deficit, grossly normal coordination, normal muscle tone  Skin  - no ecchymosis, erythema, warmth, or induration, no obvious rash  Psych  - interactive, appropriate, normal mood and affect    Assessment:   68 year old female presents for follow-up fracture, continues to have substantial pain throughout the day despite brace and ibuprofen use. MRI warranted to investigate possible delayed healing of insufficiency fracture vs. Progression.      Diagnosis:   1. Insufficeincy fracture   2. Primary arthritis of right knee.     Plan:   - Schedule MRI for further evaluation  - Continue brace use for now, may be able to discharge if MRI shows healing.  - Follow-up after MRI to discuss results; consider CSI if fracture has healed, likely pain 2/2 OA    I, Willis Borjas DO, have reviewed the above note and agree with the scribe's notation as written.    Scribe Disclosure:   I, Alexandro Contreras, am serving as a scribe to document services personally performed by Willis Borjas DO at this visit, based upon the provider's statements to me. All documentation has been reviewed by the aforementioned provider prior to being entered into the official medical record.

## 2018-11-13 NOTE — LETTER
2018       RE: Nadira Perez  [address]  [facility] [address]     Dear Colleague,    Thank you for referring your patient, Nadira Perez, to the [facility]INIC at [facility]. Please see a copy of my visit note below.          SPORTS & ORTHOPEDIC WALK-IN FOLLOW-UP VISIT 2018    Interval History:     Follow up reason: Right knee    Date of injury: 18    Date last seen: 18    Following Therapeutic Plan: Yes     Pain: Improving    Function: Improving    Interval History: Right knee meniscus tears.  She feels better with the brace on. She still has some achey pain at the end of the day. She is here today to learn the plan on what is to happen with the knee pain. She wants to understand about her diagnosis better.     Medical History:    Any recent changes to your medical history? No    Any new medication prescribed since last visit? No    Review of Systems:    Do you have fever, chills, weight loss? No    Do you have any vision problems? No    Do you have any chest pain or edema? No    Do you have any shortness of breath or wheezing?  No    Do you have stomach problems? No    Do you have any numbness or focal weakness? No    Do you have diabetes? No    Do you have problems with bleeding or clotting? No    Do you have an rashes or other skin lesions? No             [facility]  Orthopedics  Willis Borjas, DO  2018     Name: Nadira Perez  MRN: [id number]  Age: 68 year old  : 1950  Referring provider: Referred Self     Chief Complaint: RECHECK of the Right Knee    History of Present Illness:   Nadira Perez is a 68 year old female with a history of osteopenia and hyperparathyroidism  who presents today for follow-up regarding right knee pain. I last evaluated her on 18, at which time she was diagnosed with right knee pain, tears of medial meniscus of right knee, and bone contusion of  "medial femoral condyle, possibly insufficiency fracture w/o collapse. She was also placed in a medial  brace. Since last time, she notes that the pain has been improving, but she continues to endorse pain at the anterior aspect of the right knee. Pain is alleviated when taking ibuprofen. Still using ibuprofen daily.  Brace may be helping but she feels it is bulky and a nuisance.      Pain is exacerbated with weightbearing. Pain is rated a 7 out of 10 this morning. Of note, she states that she has had right knee meniscus tears since last time.     Review of Systems:   A 10-point review of systems was obtained and is negative except for as noted in the HPI.     Physical Examination:  Blood pressure 137/85, height 1.702 m (5' 7\"), weight 87.5 kg (193 lb), not currently breastfeeding.    General  - normal appearance, in no obvious distress  CV  - normal popliteal pulse  Pulm  - normal respiratory pattern, non-labored  Musculoskeletal - right knee  - stance: normal gait without limp, normal single leg squat, no obvious leg length discrepancy  - inspection: no swelling or effusion, normal bone and joint alignment, no obvious deformity  - palpation: tenderness at the medial tibial plateau, tender at the medial femoral condyle, medial and lateral patellar facette, medial greater than lateral.   - ROM: 135 degrees flexion, -5 degrees extension, not painful, normal actively and passively compared to contralateral  - strength: 5/5 in flexion, 5/5 in extension  - special tests:  (-) Lachman  (-) anterior drawer  (-) posterior drawer  (-) pivot shift  (-) Mackenzie  (-) Thessaly  (-) varus at 0 and 30 degrees flexion  (-) valgus at 0 and 30 degrees flexion  (-) Alex s compression test  (-) patellar apprehension  Neuro  - no sensory or motor deficit, grossly normal coordination, normal muscle tone  Skin  - no ecchymosis, erythema, warmth, or induration, no obvious rash  Psych  - interactive, appropriate, normal mood and " affect    Assessment:   68 year old female presents for follow-up fracture, continues to have substantial pain throughout the day despite brace and ibuprofen use. MRI warranted to investigate possible delayed healing of insufficiency fracture vs. Progression.      Diagnosis:   1. Insufficeincy fracture   2. Primary arthritis of right knee.     Plan:   - Schedule MRI for further evaluation  - Continue brace use for now, may be able to discharge if MRI shows healing.  - Follow-up after MRI to discuss results; consider CSI if fracture has healed, likely pain 2/2 OA    I, Willis Borjas DO, have reviewed the above note and agree with the scribe's notation as written.    Scribe Disclosure:   IAlexandro, am serving as a scribe to document services personally performed by Willis Borjas DO at this visit, based upon the provider's statements to me. All documentation has been reviewed by the aforementioned provider prior to being entered into the official medical record.

## 2018-11-13 NOTE — MR AVS SNAPSHOT
After Visit Summary   11/13/2018    Nadira Perez    MRN: [id number]           Patient Information     Date Of Birth          1950        Visit Information        Provider Department      11/13/2018 11:00 AM  13 ATC;  ONCOLOGY INFUSION [facility]        Today's Diagnoses     Osteopenia, unspecified location    -  1    Aromatase inhibitor use        Malignant neoplasm of left breast in female, estrogen receptor positive, unspecified site of breast (H)          Care Instructions    Contact Numbers    [facility] Main Line: [phone]  [facility] Triage and after hours / weekends / holidays:  [phone]      Please call the triage or after hours line if you experience a temperature greater than or equal to 100.5, shaking chills, have uncontrolled nausea, vomiting and/or diarrhea, dizziness, shortness of breath, chest pain, bleeding, unexplained bruising, or if you have any other new/concerning symptoms, questions or concerns.      If you are having any concerning symptoms or wish to speak to a provider before your next infusion visit, please call your care coordinator or triage to notify them so we can adequately serve you.     If you need a refill on a narcotic prescription or other medication, please call before your infusion appointment.                   November 2018 Sunday Monday Tuesday Wednesday Thursday Friday Saturday                       1     2     3       4     5     6     7     8     9     10       11     12     13     [facility]    9:30 AM   (15 min.)   [facility] LAB DRAW   [facility] Lab Draw     [facility] RETURN    9:55 AM   (50 min.)   Sari Ireland PA-C   [facility] ONC INFUSION 60   11:00 AM   (60 min.)    ONCOLOGY INFUSION   [facility] 14     15     16     17       18     19     20     21     22     23     24       25     26     27     28     29     30 December 2018    Sunday Monday Tuesday Wednesday Thursday Friday Saturday                                 1       2     3     4     5     6     7     8       9     10     11     12     13     14     15       16     17     18     19     20     [facility] RETURN HAND    9:35 AM   (10 min.)   Lien Prajapati MD   [facility] 21     [facility] RETURN CORNEA   12:15 PM   (15 min.)   Tyson Ruby MD   [facility] 22       23     24     25     26     27     28     29       30     31                                          Recent Results (from the past 24 hour(s))   CBC with platelets differential    Collection Time: 11/13/18  9:56 AM   Result Value Ref Range    WBC 7.8 4.0 - 11.0 10e9/L    RBC Count 4.58 3.8 - 5.2 10e12/L    Hemoglobin 12.5 11.7 - 15.7 g/dL    Hematocrit 39.2 35.0 - 47.0 %    MCV 86 78 - 100 fl    MCH 27.3 26.5 - 33.0 pg    MCHC 31.9 31.5 - 36.5 g/dL    RDW 15.4 (H) 10.0 - 15.0 %    Platelet Count 284 150 - 450 10e9/L    Diff Method Automated Method     % Neutrophils 66.2 %    % Lymphocytes 17.8 %    % Monocytes 7.3 %    % Eosinophils 7.5 %    % Basophils 0.6 %    % Immature Granulocytes 0.6 %    Nucleated RBCs 0 0 /100    Absolute Neutrophil 5.1 1.6 - 8.3 10e9/L    Absolute Lymphocytes 1.4 0.8 - 5.3 10e9/L    Absolute Monocytes 0.6 0.0 - 1.3 10e9/L    Absolute Eosinophils 0.6 0.0 - 0.7 10e9/L    Absolute Basophils 0.1 0.0 - 0.2 10e9/L    Abs Immature Granulocytes 0.1 0 - 0.4 10e9/L    Absolute Nucleated RBC 0.0    Comprehensive metabolic panel    Collection Time: 11/13/18  9:56 AM   Result Value Ref Range    Sodium 138 133 - 144 mmol/L    Potassium 3.1 (L) 3.4 - 5.3 mmol/L    Chloride 103 94 - 109 mmol/L    Carbon Dioxide 28 20 - 32 mmol/L    Anion Gap 7 3 - 14 mmol/L    Glucose 106 (H) 70 - 99 mg/dL    Urea Nitrogen 19 7 - 30 mg/dL    Creatinine 0.73 0.52 - 1.04 mg/dL    GFR Estimate 80 >60 mL/min/1.7m2    GFR Estimate If Black >90 >60 mL/min/1.7m2    Calcium 9.3 8.5 - 10.1 mg/dL    Bilirubin Total  0.5 0.2 - 1.3 mg/dL    Albumin 4.2 3.4 - 5.0 g/dL    Protein Total 7.8 6.8 - 8.8 g/dL    Alkaline Phosphatase 80 40 - 150 U/L    ALT 28 0 - 50 U/L    AST 22 0 - 45 U/L   TSH    Collection Time: 11/13/18  9:56 AM   Result Value Ref Range    TSH 2.06 0.40 - 4.00 mU/L   Lipid panel reflex to direct LDL Fasting    Collection Time: 11/13/18  9:56 AM   Result Value Ref Range    Cholesterol 245 (H) <200 mg/dL    Triglycerides 257 (H) <150 mg/dL    HDL Cholesterol 58 >49 mg/dL    LDL Cholesterol Calculated 135 (H) <100 mg/dL    Non HDL Cholesterol 186 (H) <130 mg/dL   Hemoglobin A1c    Collection Time: 11/13/18  9:56 AM   Result Value Ref Range    Hemoglobin A1C 6.3 (H) 0 - 5.6 %                 Follow-ups after your visit        Your next 10 appointments already scheduled     Dec 20, 2018  9:50 AM CST   (Arrive by 9:35 AM)   RETURN HAND with Lien Prajapati MD   [facility] ([facility])    [address] [place] [address]  4th Floor  [facility] [address]   [phone]            Dec 21, 2018 12:15 PM CST   RETURN CORNEA with Tyson Ruby MD   [facility] Clinic ([facility])    [address]  [facility] [address]   [phone]            May 13, 2019 11:00 AM CDT   [facility] Draw with  [facility] LAB DRAW   [facility] Lab Draw ([facility])    [address]  [address]  [facility] [address]   [phone]            May 13, 2019 11:30 AM CDT   (Arrive by 11:15 AM)   Return Visit with Khushbu Louise MD   [facility] ([facility])    [address] [place] [address]  [address]  [facility] [address]   [phone]            May 13, 2019 12:00 PM CDT   Infusion 60 with  ONCOLOGY INFUSION, UC 28 ATC   [facility] ([facility])    [address] [place] [address]  [address] [place] [address]    [phone]              Who to contact     If you have questions or need follow up information about today's clinic visit or your schedule please contact [facility] directly at [phone].  Normal or non-critical lab and imaging results will be communicated to you by MyChart, letter or phone within 4 business days after the clinic has received the results. If you do not hear from us within 7 days, please contact the clinic through MyChart or phone. If you have a critical or abnormal lab result, we will notify you by phone as soon as possible.  Submit refill requests through HOSTING or call your pharmacy and they will forward the refill request to us. Please allow 3 business days for your refill to be completed.          Additional Information About Your Visit        [employer] Information     HOSTING gives you secure access to your electronic health record. If you see a primary care provider, you can also send messages to your care team and make appointments. If you have questions, please call your primary care clinic.  If you do not have a primary care provider, please call [phone] and they will assist you.        Care EveryWhere ID     This is your Care EveryWhere ID. This could be used by other organizations to access your [place] medical records  [id number]         Blood Pressure from Last 3 Encounters:   11/13/18 137/85   10/18/18 142/88   10/16/18 137/88    Weight from Last 3 Encounters:   11/13/18 87.9 kg (193 lb 11.2 oz)   10/16/18 89.2 kg (196 lb 11.2 oz)   07/11/18 97.5 kg (215 lb)              Today, you had the following     No orders found for display         Today's Medication Changes          These changes are accurate as of 11/13/18 11:46 AM.  If you have any questions, ask your nurse or doctor.               These medicines have changed or have updated prescriptions.        Dose/Directions    amLODIPine 5 MG tablet   Commonly known as:  NORVASC   This may have changed:   how much to take   Used for:  Essential hypertension, benign        Dose:  5 mg   Take 1 tablet (5 mg) by mouth At Bedtime   Quantity:  90 tablet   Refills:  3       cholecalciferol 5000 units (125 mcg) Caps capsule   Commonly known as:  vitamin D3   This may have changed:  additional instructions   Used for:  Vaginal dryness        Dose:  5000 Units   Take 1 capsule (5,000 Units) by mouth daily   Quantity:  120 capsule   Refills:  3       lisinopril 20 MG tablet   Commonly known as:  PRINIVIL/ZESTRIL   This may have changed:  how much to take   Used for:  Benign essential hypertension        Dose:  20 mg   Take 1 tablet (20 mg) by mouth daily   Quantity:  30 tablet   Refills:  3                Primary Care Provider Office Phone # Fax #    Matilde Quiñonez, APRN -[phone]       [address] [place] [address]  [facility] [address]        Equal Access to Services     EDEL LAMA : Hadii aad ku hadasho Sosukhdev, waaxda luqadaha, qaybta kaalmada pb, cierra seals . So [facility] [phone].    ATENCIÓN: Si habla español, tiene a davis disposición servicios gratuitos de asistencia lingüística. [facility] [phone].    We comply with applicable federal civil rights laws and [place] laws. We do not discriminate on the basis of race, color, national origin, age, disability, sex, sexual orientation, or gender identity.            Thank you!     Thank you for choosing [facility]  for your care. Our goal is always to provide you with excellent care. Hearing back from our patients is one way we can continue to improve our services. Please take a few minutes to complete the written survey that you may receive in the mail after your visit with us. Thank you!             Your Updated Medication List - Protect others around you: Learn how to safely use, store and throw away your medicines at www.disposemymeds.org.          This list is accurate as of 11/13/18 11:46  AM.  Always use your most recent med list.                   Brand Name Dispense Instructions for use Diagnosis    amLODIPine 5 MG tablet    NORVASC    90 tablet    Take 1 tablet (5 mg) by mouth At Bedtime    Essential hypertension, benign       anastrozole 1 MG tablet    ARIMIDEX    90 tablet    Take 1 tablet (1 mg) by mouth daily    Malignant neoplasm of left breast in female, estrogen receptor positive, unspecified site of breast (H)       APAP 500 PO      Take 1,000 mg by mouth 2 times daily        atorvastatin 40 MG tablet    LIPITOR    90 tablet    Take 1 tablet (40 mg) by mouth daily Please put on profile- pt just received 90-day supply of cholesterol meds    Pure hypercholesterolemia       cholecalciferol 5000 units (125 mcg) Caps capsule    vitamin D3    120 capsule    Take 1 capsule (5,000 Units) by mouth daily    Vaginal dryness       CoQ10 200 MG Caps      Take 1 capsule by mouth daily        * diclofenac 1 % Gel topical gel    VOLTAREN    100 g    Apply 4 grams to knees or 2 grams to hands four times daily using enclosed dosing card.    Primary osteoarthritis of both wrists       * VOLTAREN 1 % Gel topical gel   Generic drug:  diclofenac     100 g    Apply 4 grams to knees or 2 grams to hands four times daily using enclosed dosing card.    Right knee pain, unspecified chronicity       gabapentin 300 MG capsule    NEURONTIN    540 capsule    Take 1-2 capsules by mouth every 8 hours    Neuropathic pain       glucosamine-chondroitin 500-400 MG Caps per capsule      Take 1 capsule by mouth 2 times daily        HYDROcodone-acetaminophen 5-325 MG per tablet    NORCO    40 tablet    Take 1 tablet by mouth every 6 hours as needed    Chronic left-sided low back pain, with sciatica presence unspecified       ketoconazole 2 % shampoo    NIZORAL    120 mL    Apply topically daily as needed for itching or irritation    Dermatitis, seborrheic       levothyroxine 112 MCG tablet    SYNTHROID/LEVOTHROID    45 tablet     Take 0.5 tab daily    Hypothyroidism, unspecified type       lisinopril 20 MG tablet    PRINIVIL/ZESTRIL    30 tablet    Take 1 tablet (20 mg) by mouth daily    Benign essential hypertension       LORazepam 2 MG tablet    ATIVAN    30 tablet    Take 1 tablet at night for sleep    Sleep disorder       metoprolol succinate 25 MG 24 hr tablet    TOPROL-XL    90 tablet    Take 1 tablet (25 mg) by mouth daily    Irregular heart rate       Multi-vitamin Tabs tablet   Generic drug:  multivitamin, therapeutic with minerals      Take 1 tablet by mouth daily.        OMEGA-3 FISH OIL PO      Take 1 capsule by mouth daily        ondansetron 4 MG ODT tab    ZOFRAN-ODT    90 tablet    1-2 tabs po prn every 6 hours    Nausea       REPHRESH Gel     14 Box    Place 2 g vaginally every 3 days    Urinary tract infection, site not specified       terbinafine 1 % cream    lamISIL AT    24 g    Apply topically 2 times daily For fungal infection not resolved with other antifungals (e.g. Clotrimazole)    Tinea pedis of both feet       triamcinolone 0.1 % ointment    KENALOG    30 g    Apply topically 2 times daily    Eczema, unspecified type       triamterene-hydrochlorothiazide 37.5-25 MG per tablet    MAXZIDE-25    90 tablet    Take 1 tablet by mouth daily    Essential hypertension, benign       * Notice:  This list has 2 medication(s) that are the same as other medications prescribed for you. Read the directions carefully, and ask your doctor or other care provider to review them with you.

## 2018-11-13 NOTE — LETTER
11/13/2018      RE: Nadira Perez  [address]  [facility] [address]       ONCOLOGY VISIT  Nov 13, 2018    Reason for visit: 6 mos follow up breast cancer    Cancer history: The patient is a 67-year-old woman who in 06/2007 had the onset of left-sided breast discomfort that extended into the axilla. She ultimately did undergo mammogram on 06/13/2007, which identified a mass at the 2:30 position in the left breast. Ultrasound also was notable for some skin and areolar complex thickening. She did undergo biopsy of the mass, and this was consistent with an infiltrating ductal carcinoma that was grade 2. The tumor was ER positive, UT positive, and HER2 negative by FISH. She also had a biopsy of left axillary lymph node versus the tail of the axilla, and this was consistent with metastatic carcinoma. Following the diagnosis the patient did have metastatic staging to include a PET CT scan. This demonstrated increased activity in the left breast as well as the lymph nodes, but was otherwise negative. There was also an MRI of the breast performed, and this demonstrated other lesions of the left breast, but no abnormalities on the right.     She initiated neoadjuvant chemotherapy for her inflammatory breast cancer on 07/13/2007. She received  for 3 cycles through 08/24/2007. This was followed by Taxotere for 3 cycles, which was administered 09/14/2007 through 10/26/2007. Following her chemotherapy she did undergo a left-sided mastectomy with axillary lymph node dissection on 11/20/2007 by Dr. Mauro Mei. The patient did have residual carcinoma with a 1.0 cm tumor as well as associated DCIS. Thirteen of 33 lymph nodes were positive, the largest of which was 0.6 cm of tumor infiltration and included extracapsular extension. The patient also had a right-sided prophylactic mastectomy, which did not demonstrate any noninvasive or invasive carcinoma. Just prior to her surgery Ismael was placed on Arimidex  (start date 11/15/2007). Following her surgery she did have some issues with left chest wall cellulitis as well as some lymphedema of the left upper extremity. She ultimately did go on to receive radiation therapy under the direction of Dr. Nghia Burch. She received radiation to the left chest wall at a dose of 6440 cGy, to the left supraclavicular fossa at a dose of 5040 cGy, and to the left internal mammary chain at a dose of 5080 cGy. Last dose of radiation was administered on 03/07/2008.     She completed 10 years of endocrine therapy with Arimidex and then Tamoxifen in November 2017. In 11/2017, she went back on Arimidex for a prolonged extended therapy as per the MA17 trial.    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 Perez is here for 6 month follow-up. Nothing new. Mylagias remains tolerable. She has chronic wrist pain and recent issues with her knee, which bother her more. She is active with pilates once weekly and walking, hoping to increase the pilates if she can reduce her appointment to half time. No new headaches or new neurologic symptoms. Remains with vaginal dryness, suing RePhresh every 3 days. No issues with hot flashes. No other concerns today.    ROS: 10 point ROS neg other than the symptoms noted above in the HPI.    PHYSICAL EXAMINATION:   /85 (BP Location: Right arm, Patient Position: Sitting, Cuff Size: Adult Large)  Pulse 78  Temp 98.7  F (37.1  C) (Oral)  Resp 16  Wt 87.9 kg (193 lb 11.2  oz)  SpO2 97%  BMI 30.26 kg/m2  GENERAL: The patient is alert and oriented in no acute distress.   HEENT: sclera anicteric. Oral mucosa pink and moist   LYMPHATICS: No palpable cervical, supraclavicular, axillary lymphadenopathy bilaterally.   HEART: Regular rate and rhythm.   LUNGS: Clear to auscultation bilaterally.   BREASTS: She is status post bilateral mastectomies without reconstruction. Multiple chest telengectasias.  There are no dominant masses on palpation of the chest wall, along the mastectomy scars or into the axilla bilaterally.    ABDOMEN: Soft with normal bowel sounds present. No hepatomegaly.   EXTREMITIES: Warm and well perfused with chronic left upper extremity lymphedema. No lower extremity edema.    Assessment/plan:  1. Inflammatory breast cancer, stage IIIC, the left breast, ER positive, HER2 negative, status post neoadjuvant chemotherapy followed by bilateral mastectomies. Chest wall radiotherapy completed in 03/2008. She was on adjuvant Arimidex from November 2007 until November 2012, and then was switched to tamoxifen to complete 10 years of thearpy in November 2017. At that time, she enrolled in MA 17 trial using extended Arimidex. She remains on Arimidex. Myalgias are tolerable at this time. She understands to let us know if these worsen or start to interfere with QOL. Follow-up in 6 months.    2. Bone health: History of osteoporosis. Most recent DEXA 11/2017 with T score -1.6. She continues Zometa every 6 months, due today. Next due May 2019.  3. RHM: Managed by Matilde Quiñonez CNP. UTD on appropriate screenings. Added A1c and Vit D screening per patient request. These were done one year ago and have been done annually.  4. Vaginal dryness. Continues RePhresh. Discussed Adrienne Hoover.      Sari Ireland PA-C    [facility]  [address] [place] [address] [phone]

## 2018-11-13 NOTE — PROGRESS NOTES
Infusion Nursing Note:  Nadira Perez presents today for Zometa.    Patient seen by provider today: Yes: INGA Holder   present during visit today: Not Applicable.    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 Pt did verbalize understanding of chronic low potassium and will begin taking supply of oral replacement at home. Pt will also increase intake of dietary potassium. This RN notified Sari of pt's plan.     Intravenous Access:  Peripheral IV placed.    Treatment Conditions:  Lab Results   Component Value Date    HGB 12.5 11/13/2018     Lab Results   Component Value Date    WBC 7.8 11/13/2018      Lab Results   Component Value Date    ANEU 5.1 11/13/2018     Lab Results   Component Value Date     11/13/2018      Lab Results   Component Value Date     11/13/2018                   Lab Results   Component Value Date    POTASSIUM 3.1 11/13/2018           Lab Results   Component Value Date                  Lab Results   Component Value Date    CR 0.73 11/13/2018                   Lab Results   Component Value Date    RAMBO 9.3 11/13/2018                Lab Results   Component Value Date    BILITOTAL 0.5 11/13/2018           Lab Results   Component Value Date    ALBUMIN 4.2 11/13/2018                    Lab Results   Component Value Date    ALT 28 11/13/2018           Lab Results   Component Value Date    AST 22 11/13/2018       Results reviewed, labs MET treatment parameters, ok to proceed with treatment.      Post Infusion Assessment:  Patient tolerated infusion without incident.  Blood return noted pre and post infusion.  Site patent and intact, free from redness, edema or discomfort.  No evidence of extravasations.  Access discontinued per protocol.    Zometa stopped at 1200.    Discharge Plan:   Patient declined prescription refills.  Copy of AVS reviewed with patient and/or family.  Patient will  return 5/13 for next appointment.  Patient discharged in stable condition accompanied by: self.  Departure Mode: Ambulatory.  Face to face time: 2 minutes.     ATIF BAJWA RN

## 2018-11-13 NOTE — NURSING NOTE
"Oncology Rooming Note    November 13, 2018 10:10 AM   Nadira Perez is a 68 year old female who presents for:    Chief Complaint   Patient presents with     Blood Draw     labs drawn with piv start by rn.  vs taken     Oncology Clinic Visit     Return visit related to Breast Cancer     Initial Vitals: /85 (BP Location: Right arm, Patient Position: Sitting, Cuff Size: Adult Large)  Pulse 78  Temp 98.7  F (37.1  C) (Oral)  Resp 16  Ht 1.704 m (5' 7.09\")  Wt 87.9 kg (193 lb 11.2 oz)  SpO2 97%  BMI 30.26 kg/m2 Estimated body mass index is 30.26 kg/(m^2) as calculated from the following:    Height as of this encounter: 1.704 m (5' 7.09\").    Weight as of this encounter: 87.9 kg (193 lb 11.2 oz). Body surface area is 2.04 meters squared.  Moderate Pain (5) Comment: Data Unavailable   No LMP recorded. Patient has had a hysterectomy.  Allergies reviewed: Yes  Medications reviewed: Yes    Medications: Medication refills not needed today.  Pharmacy name entered into Inter[employer]:    [facility]  [facility] [address] [place] [address]  [place]    Clinical concerns: No new concerns. Provide was notified.    10 minutes for nursing intake (face to face time)     Khushbu Lafleur LPN            "

## 2018-11-13 NOTE — MR AVS SNAPSHOT
After Visit Summary   11/13/2018    Nadira Perez    MRN: [id number]           Patient Information     Date Of Birth          1950        Visit Information        Provider Department      11/13/2018 12:20 PM Willis Borjas, DO [facility]        Today's Diagnoses     Insufficiency fracture of tibia, with delayed healing, subsequent encounter    -  1       Follow-ups after your visit        Your next 10 appointments already scheduled     Nov 21, 2018  1:45 PM CST   MR KNEE RIGHT W/O CONTRAST with [id number]   [facility] ([facility])    [address] [place] [address] [phone]           How do I prepare for my exam? (Food and drink instructions) **If you will be receiving sedation or general anesthesia, please see special notes below.**  How do I prepare for my exam? (Other instructions) Take your medicines as usual, unless your doctor tells you not to. Please remove any body piercings and hair extensions before you arrive. Follow your doctor s orders. If you do not, we may have to postpone your exam. You may or may not receive IV contrast for this exam pending the discretion of the Radiologist.  You do not need to do anything special to prepare. **If you will be receiving sedation or general anesthesia, please see special notes below.**  What should I wear:  The MRI machine uses a strong magnet. Please wear clothes without metal (snaps, zippers). A sweatsuit works well, or we may give you a hospital gown.  How long does the exam take: Most tests take 30 to 60 minutes.  HOWEVER, IF YOUR DOCTOR PRESCRIBES ANESTHESIA please plan on spending four to five hours in the recovery room.  What should I bring: Bring a list of your current medicines to your exam (including vitamins, minerals and over-the-counter drugs). Also bring the results of similar scans you may have had.  Do I need a :  **If you will be receiving sedation or general anesthesia, please see special notes below.**  What should I do after the exam? No Restrictions, You may resume normal activities.  What is this test: MRI (magnetic resonance imaging) uses a strong magnet and radio waves to look inside the body. An MRA (magnetic resonance angiogram) does the same thing, but it lets us look at your blood vessels. A computer turns the radio waves into pictures showing cross sections of the body, much like slices of bread. This helps us see any problems more clearly.  Who should I call with questions: Please call the Imaging Department at your exam site with any questions. Directions, parking instructions, and other information is available on our website, [email or web address].  How do I prepare if I m having sedation or anesthesia? **IMPORTANT** THE INSTRUCTIONS BELOW ARE ONLY FOR THOSE PATIENTS WHO HAVE BEEN TOLD THEY WILL RECEIVE SEDATION OR GENERAL ANESTHESIA DURING THEIR MRI PROCEDURE:  IF YOU WILL RECEIVE SEDATION (take medicine to help you relax during your exam): You must get the medicine from your doctor before you arrive. Bring the medicine to the exam. Do not take it at home. Arrive one hour early. Bring someone who can take you home after the test. Your medicine will make you sleepy. After the exam, you may not drive, take a bus or take a taxi by yourself. No eating 8 hours before your exam. You may have clear liquids up until 4 hours before your exam. (Clear liquids include water, clear tea, black coffee and fruit juice without pulp.)  IF YOU WILL RECEIVE ANESTHESIA (be asleep for your exam): Arrive 1 1/2 hours early. Bring someone who can take you home after the test. You may not drive, take a bus or take a taxi by yourself. No eating 8 hours before your exam. You may have clear liquids up until 4 hours before your exam. (Clear liquids include water, clear tea, black coffee and fruit juice without pulp.) You will spend four  to five hours in the recovery room.            Dec 20, 2018  9:50 AM CST   (Arrive by 9:35 AM)   RETURN HAND with Lien Prajapati MD   [facility] ([facility])    [address]  4th Floor  [facility] [address] [phone]            Dec 21, 2018 12:15 PM CST   RETURN CORNEA with Tyson Ruby MD   [facility] ([facility])    [address]  [facility] [address] [phone]            May 13, 2019 11:00 AM CDT   [facility] Lab Draw with  [facility] LAB DRAW   [facility] ([facility])    [address] [place] [address]  [address]  [facility] [address] [phone]            May 13, 2019 11:30 AM CDT   (Arrive by 11:15 AM)   Return Visit with Khushbu Louise MD   [facility] ([facility])    [address] [place] [address]  [address]  [facility] [id number] [phone]            May 13, 2019 12:00 PM CDT   Infusion 60 with UC ONCOLOGY INFUSION, UC 28 ATC   [facility] ([facility])    [address] [place] [address]  [address]  [facility] [id number] [phone]              Future tests that were ordered for you today     Open Future Orders        Priority Expected Expires Ordered    MR Knee Right w/o Contrast Routine  11/13/2019 11/13/2018            Who to contact     Please call your clinic at [phone] to:    Ask questions about your health    Make or cancel appointments    Discuss your medicines    Learn about your test results    Speak to your doctor            Additional Information About Your Visit        MyChart Information     [employer] gives you secure access to your electronic health record. If you see a primary care provider, you can also send messages to your care team and make appointments. If you have questions, please call your primary care clinic.  If you do  "not have a primary care provider, please call [phone] and they will assist you.      [employer] is an electronic gateway that provides easy, online access to your medical records. With [employer], you can request a clinic appointment, read your test results, renew a prescription or communicate with your care team.     To access your existing account, please contact your [facility] or call [phone] for assistance.        Care EveryWhere ID     This is your Care EveryWhere ID. This could be used by other organizations to access your [facility] medical records  [id number]        Your Vitals Were     Height BMI (Body Mass Index)                1.702 m (5' 7\") 30.23 kg/m2           Blood Pressure from Last 3 Encounters:   11/13/18 137/85   11/13/18 137/85   10/18/18 142/88    Weight from Last 3 Encounters:   11/13/18 87.5 kg (193 lb)   11/13/18 87.9 kg (193 lb 11.2 oz)   10/16/18 89.2 kg (196 lb 11.2 oz)                 Today's Medication Changes          These changes are accurate as of 11/13/18  1:34 PM.  If you have any questions, ask your nurse or doctor.               These medicines have changed or have updated prescriptions.        Dose/Directions    amLODIPine 5 MG tablet   Commonly known as:  NORVASC   This may have changed:  how much to take   Used for:  Essential hypertension, benign        Dose:  5 mg   Take 1 tablet (5 mg) by mouth At Bedtime   Quantity:  90 tablet   Refills:  3       cholecalciferol 5000 units (125 mcg) Caps capsule   Commonly known as:  vitamin D3   This may have changed:  additional instructions   Used for:  Vaginal dryness        Dose:  5000 Units   Take 1 capsule (5,000 Units) by mouth daily   Quantity:  120 capsule   Refills:  3       lisinopril 20 MG tablet   Commonly known as:  PRINIVIL/ZESTRIL   This may have changed:  how much to take   Used for:  Benign essential hypertension        Dose:  20 mg   Take 1 tablet (20 mg) by mouth daily   Quantity:  " 30 tablet   Refills:  3                Primary Care Provider Office Phone # Fax #    Matilde Quiñonez, APRN -[phone]       [address] [place] [address]        Equal Access to Services     EDEL LAMA : Hadii aad ku hadchelsieo Soomaali, waaxda luqadaha, qaybta kaalmada adeegyada, waxmoon onealin bitan seven pisanomirzanathanael daugherty. So [facility] [phone].    ATENCIÓN: Si habla español, tiene a davis disposición servicios gratuitos de asistencia lingüística. Llame al [phone].    We comply with applicable federal civil rights laws and [place] laws. We do not discriminate on the basis of race, color, national origin, age, disability, sex, sexual orientation, or gender identity.            Thank you!     Thank you for choosing [facility]  for your care. Our goal is always to provide you with excellent care. Hearing back from our patients is one way we can continue to improve our services. Please take a few minutes to complete the written survey that you may receive in the mail after your visit with us. Thank you!             Your Updated Medication List - Protect others around you: Learn how to safely use, store and throw away your medicines at www.disposemymeds.org.          This list is accurate as of 11/13/18  1:34 PM.  Always use your most recent med list.                   Brand Name Dispense Instructions for use Diagnosis    amLODIPine 5 MG tablet    NORVASC    90 tablet    Take 1 tablet (5 mg) by mouth At Bedtime    Essential hypertension, benign       anastrozole 1 MG tablet    ARIMIDEX    90 tablet    Take 1 tablet (1 mg) by mouth daily    Malignant neoplasm of left breast in female, estrogen receptor positive, unspecified site of breast (H)       APAP 500 PO      Take 1,000 mg by mouth 2 times daily        atorvastatin 40 MG tablet    LIPITOR    90 tablet    Take 1 tablet (40 mg) by mouth daily Please put on profile- pt just received 90-day supply  of cholesterol meds    Pure hypercholesterolemia       cholecalciferol 5000 units (125 mcg) Caps capsule    vitamin D3    120 capsule    Take 1 capsule (5,000 Units) by mouth daily    Vaginal dryness       CoQ10 200 MG Caps      Take 1 capsule by mouth daily        * diclofenac 1 % Gel topical gel    VOLTAREN    100 g    Apply 4 grams to knees or 2 grams to hands four times daily using enclosed dosing card.    Primary osteoarthritis of both wrists       * VOLTAREN 1 % Gel topical gel   Generic drug:  diclofenac     100 g    Apply 4 grams to knees or 2 grams to hands four times daily using enclosed dosing card.    Right knee pain, unspecified chronicity       gabapentin 300 MG capsule    NEURONTIN    540 capsule    Take 1-2 capsules by mouth every 8 hours    Neuropathic pain       glucosamine-chondroitin 500-400 MG Caps per capsule      Take 1 capsule by mouth 2 times daily        HYDROcodone-acetaminophen 5-325 MG per tablet    NORCO    40 tablet    Take 1 tablet by mouth every 6 hours as needed    Chronic left-sided low back pain, with sciatica presence unspecified       ketoconazole 2 % shampoo    NIZORAL    120 mL    Apply topically daily as needed for itching or irritation    Dermatitis, seborrheic       levothyroxine 112 MCG tablet    SYNTHROID/LEVOTHROID    45 tablet    Take 0.5 tab daily    Hypothyroidism, unspecified type       lisinopril 20 MG tablet    PRINIVIL/ZESTRIL    30 tablet    Take 1 tablet (20 mg) by mouth daily    Benign essential hypertension       LORazepam 2 MG tablet    ATIVAN    30 tablet    Take 1 tablet at night for sleep    Sleep disorder       metoprolol succinate 25 MG 24 hr tablet    TOPROL-XL    90 tablet    Take 1 tablet (25 mg) by mouth daily    Irregular heart rate       Multi-vitamin Tabs tablet   Generic drug:  multivitamin, therapeutic with minerals      Take 1 tablet by mouth daily.        OMEGA-3 FISH OIL PO      Take 1 capsule by mouth daily        ondansetron 4 MG ODT tab     ZOFRAN-ODT    90 tablet    1-2 tabs po prn every 6 hours    Nausea       REPHRESH Gel     14 Box    Place 2 g vaginally every 3 days    Urinary tract infection, site not specified       terbinafine 1 % cream    lamISIL AT    24 g    Apply topically 2 times daily For fungal infection not resolved with other antifungals (e.g. Clotrimazole)    Tinea pedis of both feet       triamcinolone 0.1 % ointment    KENALOG    30 g    Apply topically 2 times daily    Eczema, unspecified type       triamterene-hydrochlorothiazide 37.5-25 MG per tablet    MAXZIDE-25    90 tablet    Take 1 tablet by mouth daily    Essential hypertension, benign       * Notice:  This list has 2 medication(s) that are the same as other medications prescribed for you. Read the directions carefully, and ask your doctor or other care provider to review them with you.

## 2018-11-13 NOTE — PROGRESS NOTES
ONCOLOGY VISIT  Nov 13, 2018    Reason for visit: 6 mos follow up breast cancer    Cancer history: The patient is a 67-year-old woman who in 06/2007 had the onset of left-sided breast discomfort that extended into the axilla. She ultimately did undergo mammogram on 06/13/2007, which identified a mass at the 2:30 position in the left breast. Ultrasound also was notable for some skin and areolar complex thickening. She did undergo biopsy of the mass, and this was consistent with an infiltrating ductal carcinoma that was grade 2. The tumor was ER positive, W[place] positive, and HER2 negative by FISH. She also had a biopsy of left axillary lymph node versus the tail of the axilla, and this was consistent with metastatic carcinoma. Following the diagnosis the patient did have metastatic staging to include a PET CT scan. This demonstrated increased activity in the left breast as well as the lymph nodes, but was otherwise negative. There was also an MRI of the breast performed, and this demonstrated other lesions of the left breast, but no abnormalities on the right.     She initiated neoadjuvant chemotherapy for her inflammatory breast cancer on 07/13/2007. She received  for 3 cycles through 08/24/2007. This was followed by Taxotere for 3 cycles, which was administered 09/14/2007 through 10/26/2007. Following her chemotherapy she did undergo a left-sided mastectomy with axillary lymph node dissection on 11/20/2007 by Dr. Mauro Mei. The patient did have residual carcinoma with a 1.0 cm tumor as well as associated DCIS. Thirteen of 33 lymph nodes were positive, the largest of which was 0.6 cm of tumor infiltration and included extracapsular extension. The patient also had a right-sided prophylactic mastectomy, which did not demonstrate any noninvasive or invasive carcinoma. Just prior to her surgery Ismael was placed on Arimidex (start date 11/15/2007). Following her surgery she did have some issues with left chest wall  cellulitis as well as some lymphedema of the left upper extremity. She ultimately did go on to receive radiation therapy under the direction of Dr. Nghia Burch. She received radiation to the left chest wall at a dose of 6440 cGy, to the left supraclavicular fossa at a dose of 5040 cGy, and to the left internal mammary chain at a dose of 5080 cGy. Last dose of radiation was administered on 03/07/2008.     She completed 10 years of endocrine therapy with Arimidex and then Tamoxifen in November 2017. In 11/2017, she went back on Arimidex for a prolonged extended therapy as per the MA17 trial.    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 Perez is here for 6 month follow-up. Nothing new. Mylagias remains tolerable. She has chronic wrist pain and recent issues with her knee, which bother her more. She is active with pilates once weekly and walking, hoping to increase the pilates if she can reduce her appointment to half time. No new headaches or new neurologic symptoms. Remains with vaginal dryness, suing RePhresh every 3 days. No issues with hot flashes. No other concerns today.    ROS: 10 point ROS neg other than the symptoms noted above in the HPI.    PHYSICAL EXAMINATION:   /85 (BP Location: Right arm, Patient Position: Sitting, Cuff Size: Adult Large)  Pulse 78  Temp 98.7  F (37.1  C) (Oral)  Resp 16  Wt 87.9 kg (193 lb 11.2 oz)  SpO2 97%  BMI 30.26 kg/m2  GENERAL: The patient is alert and oriented in no acute  distress.   HEENT: sclera anicteric. Oral mucosa pink and moist   LYMPHATICS: No palpable cervical, supraclavicular, axillary lymphadenopathy bilaterally.   HEART: Regular rate and rhythm.   LUNGS: Clear to auscultation bilaterally.   BREASTS: She is status post bilateral mastectomies without reconstruction. Multiple chest telengectasias.  There are no dominant masses on palpation of the chest wall, along the mastectomy scars or into the axilla bilaterally.    ABDOMEN: Soft with normal bowel sounds present. No hepatomegaly.   EXTREMITIES: Warm and well perfused with chronic left upper extremity lymphedema. No lower extremity edema.    Assessment/plan:  1. Inflammatory breast cancer, stage IIIC, the left breast, ER positive, HER2 negative, status post neoadjuvant chemotherapy followed by bilateral mastectomies. Chest wall radiotherapy completed in 03/2008. She was on adjuvant Arimidex from November 2007 until November 2012, and then was switched to tamoxifen to complete 10 years of thearpy in November 2017. At that time, she enrolled in MA 17 trial using extended Arimidex. She remains on Arimidex. Myalgias are tolerable at this time. She understands to let us know if these worsen or start to interfere with QOL. Follow-up in 6 months.    2. Bone health: History of osteoporosis. Most recent DEXA 11/2017 with T score -1.6. She continues Zometa every 6 months, due today. Next due May 2019.  3. RHM: Managed by Matilde Quiñonez CNP. UTD on appropriate screenings. Added A1c and Vit D screening per patient request. These were done one year ago and have been done annually.  4. Vaginal dryness. Continues RePhresh. Discussed Adrienne Hoover.      Sari Ireland PA-C    [facility]  [address] [place] [id number]  [phone]

## 2018-11-13 NOTE — PROGRESS NOTES
SPORTS & ORTHOPEDIC WALK-IN FOLLOW-UP VISIT 11/13/2018    Interval History:     Follow up reason: Right knee    Date of injury: 4/12/18    Date last seen: 7/11/18    Following Therapeutic Plan: Yes     Pain: Improving    Function: Improving    Interval History: Right knee meniscus tears.  She feels better with the brace on. She still has some achey pain at the end of the day. She is here today to learn the plan on what is to happen with the knee pain. She wants to understand about her diagnosis better.     Medical History:    Any recent changes to your medical history? No    Any new medication prescribed since last visit? No    Review of Systems:    Do you have fever, chills, weight loss? No    Do you have any vision problems? No    Do you have any chest pain or edema? No    Do you have any shortness of breath or wheezing?  No    Do you have stomach problems? No    Do you have any numbness or focal weakness? No    Do you have diabetes? No    Do you have problems with bleeding or clotting? No    Do you have an rashes or other skin lesions? No

## 2018-11-20 DIAGNOSIS — Z17.0 MALIGNANT NEOPLASM OF LEFT BREAST IN FEMALE, ESTROGEN RECEPTOR POSITIVE, UNSPECIFIED SITE OF BREAST (H): ICD-10-CM

## 2018-11-20 DIAGNOSIS — C50.912 MALIGNANT NEOPLASM OF LEFT BREAST IN FEMALE, ESTROGEN RECEPTOR POSITIVE, UNSPECIFIED SITE OF BREAST (H): ICD-10-CM

## 2018-11-20 RX ORDER — ANASTROZOLE 1 MG/1
1 TABLET ORAL DAILY
Qty: 90 TABLET | Refills: 3 | Status: SHIPPED | OUTPATIENT
Start: 2018-11-20 | End: 2019-10-17

## 2018-11-21 DIAGNOSIS — I49.9 IRREGULAR HEART RATE: ICD-10-CM

## 2018-11-23 ENCOUNTER — HOSPITAL ENCOUNTER (OUTPATIENT)
Dept: PHYSICAL THERAPY | Facility: CLINIC | Age: 68
Setting detail: THERAPIES SERIES
End: 2018-11-23
Attending: NURSE PRACTITIONER
Payer: MEDICARE

## 2018-11-23 DIAGNOSIS — Z17.0 MALIGNANT NEOPLASM OF BREAST IN FEMALE, ESTROGEN RECEPTOR POSITIVE, UNSPECIFIED LATERALITY, UNSPECIFIED SITE OF BREAST (H): Primary | ICD-10-CM

## 2018-11-23 DIAGNOSIS — I10 BENIGN ESSENTIAL HYPERTENSION: ICD-10-CM

## 2018-11-23 DIAGNOSIS — Z78.0 ASYMPTOMATIC MENOPAUSAL STATE: ICD-10-CM

## 2018-11-23 DIAGNOSIS — C50.919 MALIGNANT NEOPLASM OF BREAST IN FEMALE, ESTROGEN RECEPTOR POSITIVE, UNSPECIFIED LATERALITY, UNSPECIFIED SITE OF BREAST (H): Primary | ICD-10-CM

## 2018-11-23 DIAGNOSIS — C50.912 MALIGNANT NEOPLASM OF LEFT BREAST (H): ICD-10-CM

## 2018-11-23 PROCEDURE — 97110 THERAPEUTIC EXERCISES: CPT | Mod: GP | Performed by: PHYSICAL THERAPIST

## 2018-11-23 PROCEDURE — 40000449 ZZHC STATISTIC PT VISIT, LYMPHEDEMA: Performed by: PHYSICAL THERAPIST

## 2018-11-23 PROCEDURE — 97535 SELF CARE MNGMENT TRAINING: CPT | Mod: GP | Performed by: PHYSICAL THERAPIST

## 2018-11-23 PROCEDURE — 97162 PT EVAL MOD COMPLEX 30 MIN: CPT | Mod: GP | Performed by: PHYSICAL THERAPIST

## 2018-11-23 PROCEDURE — G8988 SELF CARE GOAL STATUS: HCPCS | Mod: GP,CH | Performed by: PHYSICAL THERAPIST

## 2018-11-23 PROCEDURE — G8987 SELF CARE CURRENT STATUS: HCPCS | Mod: GP,CI | Performed by: PHYSICAL THERAPIST

## 2018-11-23 PROCEDURE — 97140 MANUAL THERAPY 1/> REGIONS: CPT | Mod: GP | Performed by: PHYSICAL THERAPIST

## 2018-11-23 PROCEDURE — 97161 PT EVAL LOW COMPLEX 20 MIN: CPT | Mod: GP | Performed by: PHYSICAL THERAPIST

## 2018-11-24 ENCOUNTER — RADIANT APPOINTMENT (OUTPATIENT)
Dept: MRI IMAGING | Facility: CLINIC | Age: 68
End: 2018-11-24
Attending: FAMILY MEDICINE
Payer: MEDICARE

## 2018-11-24 DIAGNOSIS — M84.469G INSUFFICIENCY FRACTURE OF TIBIA, WITH DELAYED HEALING, SUBSEQUENT ENCOUNTER: ICD-10-CM

## 2018-11-24 NOTE — PROGRESS NOTES
11/23/18 1500   Quick Adds   Quick Adds Certification   Rehab Discipline   Discipline PT   Type of Visit   Type of visit Initial Edema Evaluation   General Information   Start of care 11/23/18   Referring physician Dr. Khushbu Louise   Orders Evaluate and treat as indicated   Order date 11/08/18   Medical diagnosis malignant neoplasm of left breast   Edema onset 11/08/18   Affected body parts LUE   Edema etiology Cancer with lymph node dissection;Radiation;Chemo;Surgery   Location - Cancer with lymph node dissection inflammatory breast cancer, left breast, left axillary LND   Location - Radiation left breast/axilla   Radiation comments radiation completed 2008   Chemotherapy comments completed   Edema etiology comments patient previously seen for lymphedema treatment with home program consisting of: gradient compression bandaging, self manual lymphatic drainage, compression pump, lymphatic exercises, compression sleeve and gauntlet, and velcro bandaging alternative device. difficulty adhering to home program due to work schedule and problems with bilateral wrist injuries.    Pertinent history of current problem (PT: include personal factors and/or comorbidities that impact the POC; OT: include additional occupational profile info) low complexity: limb heaviness, difficulty with self care and home/work related activities, affects proper fit of clothing. patient with dx left breast cancer, neoadjuvant chemotherapy for inflammatory breast cancer 2007. bilateral mastectomies with left ALND (13 of 33 LN positive). following surgery some issues with left chest wall cellulitis. lymphedema left UE. other PMH includes: severe osteopenia, h/o hypercalcemia due to hyperparathyroidism, s/p parathyroidectomy and resection of a right parathyroid adenoma, repear parathyroidectomy and partial thyroid resection 10/08 compliccated by recurrent laryngeal nerve injury, h/o perineal squamous carcinoma in situ as well as hyperplasia at the  vagina dx in 1980, h/o right unilateral salpingo-oophorectomy due to endometriosis in the early 1990's, BALWINDER with LSO in 2000, h/o right wrist surgery due to trauma 2005, left wrist surgery due to trauma feb/2013.   Surgical / medical history reviewed Yes   Prior level of functional mobility independent   Prior treatment Complete decongestive therapy;Compression garments;Exercise;Compression pump;MLD;Gradient compression bandaging  (velcro bandaging alternative device)   Community support Family / friend caregiver   Patient role / employment history Employed   Psychosocial concerns Impaired body image;Impaired coping   Living environment [facility]   Fall Risk Screen   Fall screen completed by PT   Have you fallen 2 or more times in the past year? No   Have you fallen and had an injury in the past year? Yes   Is patient a fall risk? Yes   Fall screen comments patient fell 4/12/2018 while carrying objects unable to see 4 inch step, tripped and fell injurying right knee. patient reporting has order for balance eval, to be scheduled.    Abuse Risk Screen   QUESTION TO PATIENT:  Has a member of your family or a partner(now or in the past) intimidated, hurt, manipulated, or controlled you in any way? no   QUESTION TO PATIENT: Do you feel safe going back to the place where you are living? yes   OBSERVATION: Is there reason to believe there has been maltreatment of a vulnerable adult (ie. Physical/Sexual/Emotional abuse, self neglect, lack of adequate food, shelter, medical care, or financial exploitation)? no   System Outcome Measures   Outcome Measures Lymphedema   Lymphedema Life Impact Scale (score range 0-72). A higher score indicates greater impairment. 12   Subjective Report   Patient report of symptoms LLIS: score 12, 18% impairment. PHYSICAL CONCERNS: pain 0/4, limb heaviness 1/4, skin tightness 0/4, size comparison 1/4, affects movement 1/4, affects strength 2/4 (patient also stating strength also due to  "wrist injury), PSYCHOSOCIAL CONCERNS: affects body image 1/4, affects socializing with others 0/4, affects intimate relations 0/4, lymphedema \"gets me down\" (depression, frustration, anger) 1/4, i must rely on others for help due to lymphedema 0/4, i know what to do to manage my lymphedema 0/4. FUNCTIONAL CONCERNS: affects ability to perform self care activities 1/4, affects ability to perform routine home/work related activities 1/4, affects performance of preferred leisure activities 1/4, affects proper fit of clothing 2/4, affects sleep 0/4. INFECTION OCCURRENCE: ill with infection in the past year 0.    Patient / Family Goals   Patient / family goals statement decrease swelling, prevent problems.    Pain   Patient currently in pain Yes   Pain location right knee   Pain rating 3-6/10   Additional pain locations? Pain location 2;Pain location 3   Pain location 2 bilateral wrists   Pain rating 2 right 3/10, left 4/10   Pain location 3 back   Pain rating 3 3-4/10   Vitals Signs   Vital Signs Comments BMI: 30.26   Cognitive Status   Orientation Orientation to person, place and time   Level of consciousness Alert   Follows commands and answers questions 100% of the time   Personal safety and judgement Intact   Memory Intact   Edema Exam / Assessment   Skin condition Non-pitting;Intact   Skin condition comments   puffy edema primarily left upper arm. no erythema or warmth noted.     Stemmer sign Negative  (left second finger)   Girth Measurements   Girth Measurements Refer to separate girth measurement flowsheet   Volume UE   Right UE (mL) 1950   Left UE (mL) 2206   UE volume comparison LUE volume greater than RUE volume   % difference 13   Range of Motion   ROM comments WNL, but patient reports tightness in axilla with full ROM   Strength   Right hand  (pounds) 30.6   Left hand  (pounds) 27.6   Sensory   Sensory perception comments patient reports numbness/tingling bilateral feet due to neuropathy. not related " "to chemo \"i had this before chemo\"   Planned Edema Interventions   Planned edema interventions Manual lymph drainage;Gradient compression bandaging;Fit for compression garment;Exercises;Precautions to prevent infection / exacerbation;Education;Manual therapy;Skin care / precautions;Soft tissue mobilization;Home management program development   Clinical Impression   Criteria for skilled therapeutic intervention met Yes   Therapy diagnosis left UE lymphedema   Influenced by the following impairments / conditions Stage 2   Functional limitations due to impairments / conditions affects proper fit of clothing, impaired body image, h/o infection, impaired mobility   Clinical Presentation Stable/Uncomplicated   Clinical Presentation Rationale LLIS   Clinical Decision Making (Complexity) Low complexity   Treatment frequency 3 times / week   Treatment duration 2 weeks, then assess if further treatment is needed.    Patient / family and/or staff in agreement with plan of care Yes   Risks and benefits of therapy have been explained Yes   Clinical impression comments patient with stage 2 lymphedema left UE. difficulty adhereing to home program due to work schedule. increased problems with limb heaviness, performing self care and home/work related activities, and fit of clothing. although measurements show left UE has decreased from previous measurement 1.9% right UE decreased 11% with left > right 13%. previous measurements left > right 4%.  recommending treatment to decrease edema and re-establish home program to prevent problems.    Education Assessment   Preferred learning style Listening;Reading;Demonstration;Pictures / video   Barriers to learning No barriers   Goal 1   Goal identifier HOME PROGRAM   Goal description patient will demonstrate understanding of techniques to independently manage her lymphedema at home. home program to include: gradient compression bandaging, compression garments (donning/doffing, wear " schedule, and care of garments), use of velcro night garment, self manual lymphatic drainage, lymphatic exercises, use of compression pump, and skin care.   Target date 12/31/18   Goal 2   Goal identifier LLIS   Goal description LLIS score to show 8+ improvement: 1 point improvement in limb heaviness and mobility of left arm to improve self care and routine self care activities and home/work related activities and to improve fit of clothing.   Target date 12/31/18   Goal 3   Goal identifier VOLUMETRIC MEASUREMENTS   Goal description patient will have a 5-10% reduction in swelling to decrease risk of infection and enable the patient to wear normal clothes.    Target date 12/31/18   Total Evaluation Time   Total evaluation time 20   Certification   Certification date from 11/23/18   Certification date to 12/31/18   Medical Diagnosis lymphedema, malignant neoplasm of left breast   Certification I certify the need for these services furnished under this plan of treatment and while under my care.  (Physician co-signature of this document indicates review and certification of the therapy plan).

## 2018-11-24 NOTE — PROGRESS NOTES
[facility]        OUTPATIENT PHYSICAL THERAPY EDEMA EVALUATION  PLAN OF TREATMENT FOR OUTPATIENT REHABILITATION  (COMPLETE FOR INITIAL CLAIMS ONLY)  Patient's Last Name, First Name, Nadira Meyers                           Provider s Name:   [facility] Medical Record No.  [id number]     Start of Care Date:  11/23/18   Onset Date:  11/08/18   Type:  PT   Medical Diagnosis:  lymphedema, malignant neoplasm of left breast   Therapy Diagnosis:  left UE lymphedema Visits from SOC:  1                                     __________________________________________________________________________________   Plan of Treatment/Functional Goals:    Manual lymph drainage, Gradient compression bandaging, Fit for compression garment, Exercises, Precautions to prevent infection / exacerbation, Education, Manual therapy, Skin care / precautions, Soft tissue mobilization, Home management program development        GOALS  1. Goal description: patient will demonstrate understanding of techniques to independently manage her lymphedema at home. home program to include: gradient compression bandaging, compression garments (donning/doffing, wear schedule, and care of garments), use of velcro night garment, self manual lymphatic drainage, lymphatic exercises, use of compression pump, and skin care.       Target date: 12/31/18  2. Goal description: LLIS score to show 8+ improvement: 1 point improvement in limb heaviness and mobility of left arm to improve self care and routine self care activities and home/work related activities and to improve fit of clothing.       Target date: 12/31/18  3. Goal description: patient will have a 5-10% reduction in swelling to decrease risk of infection and enable the patient to wear normal clothes.        Target date: 12/31/18    Treatment  frequency: 3 times / week   Treatment duration: 2 weeks, then assess if further treatment is needed.     Maya Anaya, PT                                    I CERTIFY THE NEED FOR THESE SERVICES FURNISHED UNDER        THIS PLAN OF TREATMENT AND WHILE UNDER MY CARE     (Physician co-signature of this document indicates review and certification of the therapy plan).                   Certification date from: 11/23/18       Certification date to: 12/31/18           Referring physician: Dr. Khushbu Louise   Initial Assessment  See Epic Evaluation- Start of care: 11/23/18

## 2018-11-26 ENCOUNTER — HOSPITAL ENCOUNTER (OUTPATIENT)
Dept: PHYSICAL THERAPY | Facility: CLINIC | Age: 68
Setting detail: THERAPIES SERIES
End: 2018-11-26
Attending: NURSE PRACTITIONER
Payer: MEDICARE

## 2018-11-26 DIAGNOSIS — I89.0 LYMPHEDEMA: Primary | ICD-10-CM

## 2018-11-26 DIAGNOSIS — I49.9 IRREGULAR HEART RATE: ICD-10-CM

## 2018-11-26 PROCEDURE — 97140 MANUAL THERAPY 1/> REGIONS: CPT | Mod: GP | Performed by: PHYSICAL THERAPIST

## 2018-11-26 PROCEDURE — 40000449 ZZHC STATISTIC PT VISIT, LYMPHEDEMA: Performed by: PHYSICAL THERAPIST

## 2018-11-26 RX ORDER — METOPROLOL SUCCINATE 25 MG/1
25 TABLET, EXTENDED RELEASE ORAL DAILY
Qty: 90 TABLET | Refills: 2 | Status: SHIPPED | OUTPATIENT
Start: 2018-11-26 | End: 2019-06-24 | Stop reason: DRUGHIGH

## 2018-11-26 RX ORDER — LISINOPRIL 20 MG/1
TABLET ORAL
Qty: 30 TABLET | Refills: 3 | OUTPATIENT
Start: 2018-11-26

## 2018-11-27 RX ORDER — METOPROLOL SUCCINATE 25 MG/1
TABLET, EXTENDED RELEASE ORAL
Qty: 90 TABLET | Refills: 0 | OUTPATIENT
Start: 2018-11-27

## 2018-11-28 ENCOUNTER — HOSPITAL ENCOUNTER (OUTPATIENT)
Dept: PHYSICAL THERAPY | Facility: CLINIC | Age: 68
Setting detail: THERAPIES SERIES
End: 2018-11-28
Attending: NURSE PRACTITIONER
Payer: MEDICARE

## 2018-11-28 DIAGNOSIS — Z90.13 H/O BILATERAL MASTECTOMY: Primary | ICD-10-CM

## 2018-11-28 PROCEDURE — 97140 MANUAL THERAPY 1/> REGIONS: CPT | Mod: GP | Performed by: PHYSICAL THERAPIST

## 2018-11-28 PROCEDURE — 40000449 ZZHC STATISTIC PT VISIT, LYMPHEDEMA: Performed by: PHYSICAL THERAPIST

## 2018-11-28 PROCEDURE — 97535 SELF CARE MNGMENT TRAINING: CPT | Mod: GP | Performed by: PHYSICAL THERAPIST

## 2018-11-30 ENCOUNTER — OFFICE VISIT (OUTPATIENT)
Dept: ORTHOPEDICS | Facility: CLINIC | Age: 68
End: 2018-11-30
Payer: MEDICARE

## 2018-11-30 ENCOUNTER — RADIANT APPOINTMENT (OUTPATIENT)
Dept: BONE DENSITY | Facility: CLINIC | Age: 68
End: 2018-11-30
Attending: PHYSICIAN ASSISTANT
Payer: MEDICARE

## 2018-11-30 ENCOUNTER — HOSPITAL ENCOUNTER (OUTPATIENT)
Dept: PHYSICAL THERAPY | Facility: CLINIC | Age: 68
Setting detail: THERAPIES SERIES
End: 2018-11-30
Attending: NURSE PRACTITIONER
Payer: MEDICARE

## 2018-11-30 VITALS — HEIGHT: 67 IN | BODY MASS INDEX: 30.29 KG/M2 | WEIGHT: 193 LBS

## 2018-11-30 DIAGNOSIS — C50.919 MALIGNANT NEOPLASM OF BREAST IN FEMALE, ESTROGEN RECEPTOR POSITIVE, UNSPECIFIED LATERALITY, UNSPECIFIED SITE OF BREAST (H): ICD-10-CM

## 2018-11-30 DIAGNOSIS — Z17.0 MALIGNANT NEOPLASM OF BREAST IN FEMALE, ESTROGEN RECEPTOR POSITIVE, UNSPECIFIED LATERALITY, UNSPECIFIED SITE OF BREAST (H): ICD-10-CM

## 2018-11-30 DIAGNOSIS — I89.0 LYMPHEDEMA: ICD-10-CM

## 2018-11-30 DIAGNOSIS — M17.11 PRIMARY OSTEOARTHRITIS OF RIGHT KNEE: Primary | ICD-10-CM

## 2018-11-30 DIAGNOSIS — Z78.0 ASYMPTOMATIC MENOPAUSAL STATE: ICD-10-CM

## 2018-11-30 PROCEDURE — 40000449 ZZHC STATISTIC PT VISIT, LYMPHEDEMA: Performed by: PHYSICAL THERAPIST

## 2018-11-30 PROCEDURE — 97140 MANUAL THERAPY 1/> REGIONS: CPT | Mod: GP | Performed by: PHYSICAL THERAPIST

## 2018-11-30 RX ADMIN — LIDOCAINE HYDROCHLORIDE 4 ML: 10 INJECTION, SOLUTION INFILTRATION; PERINEURAL at 13:38

## 2018-11-30 RX ADMIN — TRIAMCINOLONE ACETONIDE 40 MG: 40 INJECTION, SUSPENSION INTRA-ARTICULAR; INTRAMUSCULAR at 13:38

## 2018-11-30 NOTE — PROGRESS NOTES
SPORTS & ORTHOPEDIC WALK-IN FOLLOW-UP VISIT 11/30/2018    Interval History:     Follow up reason: R knee MRI     Date of injury: 4/12/18    Date last seen: 11/13/18    Following Therapeutic Plan: Yes     Pain: Unchanged    Function: Unchanged    Interval History: R sided LBP started last weekend, mentions a brief episode of sciatica on R side. Has been taking Vicodin and NSAIDs.    Medical History:    Any recent changes to your medical history? No    Any new medication prescribed since last visit? No

## 2018-11-30 NOTE — PATIENT INSTRUCTIONS
Steroid Injection Information:    A corticosteroid injection was administered at your visit today.  The area of injection may be sore, slightly swollen for 1-2 days afterward.  Immediately after injection, you may have an area of numbness, which is caused by the local anesthetic, lidocaine (similar to novacaine) in the shot.  This medicine will wear off in about 4 hours.  In addition to the lidocaine, a steroid medication was injected, called triamcinolone acetate.  This medication is intended to provide long-acting antiinflammatory / pain relief.  It may take 2-5 days for this medication to provide noticeable relief.      After an injection, Dr. Borjas recommends:      Protecting the area wearing a bandage or gauze pad for at least 24 hours.      Using ice; ice bag or frozen vegetables over the injection site every one to two hours when able (for 15 minutes at a time).        Avoid any strenuous activity even if the knee is already feeling better immediately afterward. Light stretching is encouraged but refrain from home exercise program and increasing activity level for about 48 hours.      Avoid soaking in a hot tub, bath, or pool for 48 hours after injection. Showering is fine!      Watch for signs of infection, including increasing pain, redness and swelling that last more than 48 hours after injection.           Low back pain     What is low back pain?    Low back pain is pain and stiffness in the lower back.  It is one of the most common reasons people miss work.      How does it occur?    Low back pain is usually caused when a ligament or muscle holding a vertebra in its proper position is strained.  Vertebrae are bones that make up the spinal column through which the spinal cord passes.  When these muscles or ligaments become weak, the spine loses its ability, resulting in pain.  Because nerves reach all parts of the body from the spinal cord, back problems can lead to pain or weakness in almost any part of  the body.      Low back pain can occur if your job involves lifting and carrying heavy objects, or if you spend a lot of time sitting or standing in one position or bending over.  It can be caused by a fall or by unusually strenuous exercise.  It can be brought on by the tension and stress that causes headaches in some people.  It can even be brought on by violent sneezing or coughing.      People who are overweight may have low back pain because of the added stress on their back.      Back pain may occur when the muscles, joints, bones and connective tissues in the back become inflamed as a result of an infection or an immune system problem.  Arthritic disorders as well as some congenital and degenerative conditions may cause back pain.      Back pain accompanied by loss of bladder or bowel control, difficulty moving your legs, or numbness or tingling in your arms or legs may indicate an injury to your spine and nerves, which requires immediate medical treatment.      What are the symptoms?    Symptoms include:      Pain in the back or legs    Stiffness and limited motion    The pain may be continuous or may occur in certain positions.  It may be aggravated by coughing, sneezing, bending, twisting, or straining during a bowel movement. The pain may occur in only one spot or may spread to other areas, most commonly down the buttocks and into the back of the thigh.     A low back strain typically does not produce pain past the knee into the calf or foot.  Tingling and numbness in the calf or foot may indicate a herniated disk or pinched nerve.      How is it diagnosed?    Your healthcare provider will review your medical history and examine you.  He or she may order X-rays.  In certain situations, a myelogram, CT scan, or MRI may be ordered.    How is it treated?    The early stages of back pain with muscle spasms should be treated with ice packs for 20-30 minutes every 4 to 6 hours for the first 2 to 3 days. You m  ay lie on a frozen gel pack, crushed ice, or a bag of frozen peas.    The following are always to treat low back pain:      After the initial injury, applying heat from a heating pad or hot water bottle    Resting in bed on a firm mattress.  Often it helps to lie on your back with your knees raised.  However, isome people prefer to lie on their side with their knees bent.      Taking aspirin, ibuprofen, or other anti-inflammatory medications; muscle relaxants; or other pain medications if recommended by your healthcare provider (adults aged 65 years and older should not take non-steroidal anti-inflammatory medicine for more than 7 days without their healthcare provider's approval).    Having your back massaged by a trained person    Having traction, if recommended by your provider    Wearing a belt or corset to support your back    Talking with a counselor, if your back pain is related to tension caused by emotional problems.    Beginning a program of physical therapy, or exercising on your own.  Begin a regular exercise program to gently stretch and strengthen your muscles as soon as you can.  Your healthcare provider or physical therapist can recommend exercises that will not only help you feel better but will strengthen your muscles and help avoid back trouble later.      When the pain subsides, ask your healthcare provider about starting an exercise program such as the following:       Exercise moderately every day, using stretching and warm-up exercises suggested by your provider or physical therapist.    Exercise vigorously for about 30 minutes two or three times a week by walking, swimming, using a stationary bicycle, or doing low-impact aerobics.    Participating regularly in an exercise program will not only help your back, it will also help keep you healthier overall.     How long will the effects last?      The effects of back pain last as long as the cause exists or until your body recovers from the  strain, usually a day or two but sometimes weeks.     How can I take care of myself?    In addition to the treatment described above, keep in mind these suggestions:      Use an electric heating pad on a low setting (or a hot water bottle wrapped in a towel to avoid burning yourself) for 20 to 30 minutes.  Don't let the heating pad get too hot, and don't fall asleep with it.  You could get a burn.     Try putting an ice pack wrapped in a towel on your back for 20 minutes, one to four times a day.  Set an alarm to avoid frostbite from using the ice pack too long.    Put a pillow under your knees when you are lying down.    Sleep without a pillow under your head.    Lose weight if you are overweight.    Practice good posture.  Stand with your head up, shoulders straight and chest forward, weight balanced evenly on both feet, and pelvis tucked in.     Pain is the best way to  the pace you should set in increasing your activity and exercise.  Minor discomfort, stiffness, soreness, and mild aches need not interfere with activity.  However, limit your activity temporarily if:      your symptoms return    the pain increases when you are more active    the pain increases within 24 hours after a new or higher level of activity    When can I return to my sport or activity?    The goal of rehabilitation is to return you to your sport or activity as soon as safely possible. If you return too soon you may worsen your injury, which could lead to permanent damage.  Everyone recovers from injury at different rate.  Return to your sport will be determined by how soon your back recovers, not by how many days or weeks it has been since your injury occurred.  In general, the longer you have symptoms before you start treatment, the longer it will take to get better.      It is important that you have fully recovered from your low back pain before you return to your sport or any strenuous activity.  You must be able to have the same  range of motion that you had before your injury.  You must be able to run, jump and twist without pain.      What can I do to help prevent low back pain?    You can reduce the strain on your back by doing the following:      Don't push with your arms when you move a heavy object.  Turn around and push backwards so the strain is taken by your legs.     Whenever you sit, sit in a straight-backed chair and hold your spine against the back of the chair.     Bend your knees and hips and keep your back straight when you lift a heavy object.     Avoid lifting heavy objects higher than your waist    Hold packages you carry close to your body, with your arms bent.    Use a footrest for one foot when you stand or sit in one spot for a long time.  This keeps your back straight.    Bend your knees when you bend over.    Sit close to the pedals when you drive and use your seat belt and a hard backrest or pillow.     Lie on your side with your knees bent when you sleep or rest.  It may help to put a pillow between your knees.    Put a pillow under your knees when you sleep on your back.    Raise the foot of the bed 8 inches to discourage sleeping on your stomach unless you have other problems that require that you keep your head elevated.      To rest your back, hold each of these positions for 5 minutes or longer:  Lie on your back, bend your knees, and put pillows under your knees.    Lie on your back, put a pillow under your neck, bend your knees to a 90-degree angle, and put your lower legs and feet on a chair.    Lie on your back, bend your knees, and bring one knee up to your chest and hold it there.  Repeat with the other knee, then bring both knees to your chest.  When holding your knee to your chest, grab your thigh rather than your lower leg to avoid over flexing your knee.           Exercises that stretch and strengthen the muscles of your abdomen and spine can help prevent back problems. Strong back and abdominal  muscles help you keep good posture, with your spine in its correct position.    If your muscles are tight, take a warm shower or bath before doing the exercises. Exercise on a rug or mat. Wear loose clothing. Don t wear shoes. Stop doing any exercise that causes pain until you have talked with your healthcare provider.    Ask your provider or physical therapist to help you develop an exercise program. Ask your provider how many times a week you need to do the exercises. Remember to start slowly.    Excerpts from: The Sports Medicine Patient Advisor 3rd edition. Copyright 2010 [employer].     Low Back Pain Exercises    EXERCISES  These exercises are intended only as suggestions. Be sure to check with your provider before starting the exercises.    Standing hamstring stretch: Put the heel of one leg on a stool about 15 inches high. Keep your leg straight. Lean forward, bending at the hips until you feel a mild stretch in the back of your thigh. Make sure you do not roll your shoulders or bend at the waist when doing this. You want to stretch your leg, not your lower back. Hold the stretch for 15 to 30 seconds. Repeat with each leg 3 times.    Cat and camel: Get down on your hands and knees. Let your stomach sag, allowing your back to curve downward. Hold this position for 5 seconds. Then arch your back and hold for 5 seconds. Do 2 sets of 15.    Quadruped arm and leg raise: Get down on your hands and knees. Pull in your belly button and tighten your abdominal muscles to stiffen your spine. While keeping your abdominals tight, raise one arm and the opposite leg away from you. Hold this position for 5 seconds. Lower your arm and leg slowly and change sides. Do this 10 times on each side.    Pelvic tilt: Lie on your back with your knees bent and your feet flat on the floor. Pull your belly button in towards your spine and push your lower back into the floor, flattening your back. Hold this position for 15  seconds, then relax. Repeat 5 to 10 times.  Partial curl: Lie on your back with your knees bent and your feet flat on the floor. Draw in your abdomen and tighten your stomach muscles. With your hands stretched out in front of you, curl your upper body forward until your shoulders clear the floor. Hold this position for 3 seconds. Don't hold your breath. It helps to breathe out as you lift your shoulders. Relax back to the floor. Repeat 10 times. Build to 2 sets of 15. To challenge yourself, clasp your hands behind your head and keep your elbows out to your sides.    Gluteal stretch: Lie on your back with both knees bent. Rest your right ankle over the knee of your left leg. Grasp the thigh of the left leg and pull toward your chest. You will feel a stretch along the buttocks and possibly along the outside of your hip. Hold the stretch for 15 to 30 seconds. Then repeat the exercise with your left ankle over your right knee. Do the exercise 3 times with each leg.    Extension exercise  Lie face down on the floor for 5 minutes. If this hurts too much, lie face down with a pillow under your stomach. This should relieve your leg or back pain. When you can lie on your stomach for 5 minutes without a pillow, you can continue with Part B of this exercise.    After lying on your stomach for 5 minutes, prop yourself up on your elbows for another 5 minutes. If you can do this without having more leg or buttock pain, you can start doing part C of this exercise.    Lie on your stomach with your hands under your shoulders. Then press down on your hands and extend your elbows while keeping your hips flat on the floor. Hold for 1 second and lower yourself to the floor. Do 3 to 5 sets of 10 repetitions. Rest for 1 minute between sets. You should have no pain in your legs when you do this, but it is normal to feel some pain in your lower back.    Do this exercise several times a day.    Side plank: Lie on your side with your legs,  hips, and shoulders in a straight line. Prop yourself up onto your forearm with your elbow directly under your shoulder. Lift your hips off the floor and balance on your forearm and the outside of your foot. Try to hold this position for 15 seconds and then slowly lower your hip to the ground. Switch sides and repeat. Work up to holding for 1 minute. This exercise can be made easier by starting with your knees and hips flexed toward your chest.    EXERCISES TO AVOID     It s best to avoid the following exercises because they strain the lower back:    Exercises in which you lie on your back and raise and lower both legs together    Full sit-ups or sit-ups with straight legs    Hip twists    SPORTS AND OTHER ACTIVITIES    In addition to strengthening your back muscles, it s helpful to keep your entire body in shape. Good activities for people with back problems include:      Walking    Bicycling    Swimming    Cross-country skiing    Yoga    S[employer]    Pilates    Some sports can hurt your back because of rough contact, twisting, sudden impact, or direct stress on your back. Sports that may be dangerous to your back include:      Football    Soccer    Volleyball    Handball    Golf    Weight lifting    Trampoline    Tobogganing    Sledding    Snowmobiling    Snowboarding    Ice hockey            Try lifting both knees to chest as well. This is a great stretch first thing in the morning, while still in bed.

## 2018-11-30 NOTE — PROGRESS NOTES
"[facility]  Orthopedics  Willis Borjas, DO  2018     Name: Nadira Perez  MRN: [id number]  Age: 68 year old  : 1950  Referring provider: Referred Self     Chief Complaint: Pain of the Right Knee       History of Present Illness:   Nadira Perez is a 68 year old female with a history of osteopenia and hyperparathyroidism who presents today for follow-up regarding right knee pain. I last evaluated her on 18 at which time she continued to endorse pain at the anterior aspect of the right knee. She felt that the brace may be helping, but was bulky and a nuisance. At that time, we elected to obtain an MRI to investigate possible delayed healing of insufficiency fracture vs. Progression.    Today, she reports that her knee pain feels worse today. She mentions a brief episode of right sided sciatica. Localizes her pain on the medial aspect of her knee that is exacerbated by walking. Has been treating her pain with Vicodin and NSAIDs.     Of note: She is leaving for out of town on Wednesday to [facility].     Review of Systems:   A 10-point review of systems was obtained and is negative except for as noted in the HPI.     Physical Examination:  Height 1.702 m (5' 7\"), weight 87.5 kg (193 lb), not currently breastfeeding.  General  - normal appearance, in no obvious distress  CV  - normal popliteal pulse  Pulm  - normal respiratory pattern, non-labored  Musculoskeletal - right knee  - stance: normal gait without limp, normal single leg squat, no obvious leg length discrepancy  - inspection: no swelling or effusion, normal bone and joint alignment, no obvious deformity  - palpation: Tender at the medial femoral condyle, medial patellar facet, patellar tendon tenderness, quadricept tendon distally, Pes anserine bursa medially.  - ROM: 135 degrees flexion, -5 degrees extension, not painful, normal actively and passively compared to contralateral  - strength: 5/5 in flexion, 5/5 in " extension  - special tests:  (-) Lachman  (-) anterior drawer  (-) posterior drawer  (+) Mackenzie  (-) Thessaly  (-) varus at 0 and 30 degrees flexion  (-) valgus at 0 and 30 degrees flexion  (-) Alex s compression test  Neuro  - no sensory or motor deficit, grossly normal coordination, normal muscle tone  Skin  - no ecchymosis, erythema, warmth, or induration, no obvious rash  Psych  - interactive, appropriate, normal mood and affect     Imaging:  MRI right knee w/o contrast (11/24/18)  1. Resolution of previously noted insufficiency fracture of the tibial  plateau. In addition, marked improvement of medial femoral condyle  subchondral marrow edema, also likely indicate resolution/marked  improvement of additional prior insufficiency fracture.  2. Pes anserine/tibial collateral ligament bursitis, similar to prior.  3. Free edge tear of medial meniscus body/posterior horn, similar to  prior.  4. 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    Per radiology.    I have independently reviewed the above imaging studies; the results were discussed with the patient.     Assessment:   Nadira is a 68 year old following up for a review of MRI results and persistent right knee pain. MRI reveals insufficiency fracture is healed at this point. Therefore, I feel that her symptoms are derived from her medial knee arthritis and generative meniscal tear. Recommend corticosterone injection today and formal physical therapy.    Diagnosis:   1. Insufficiency fracture of right knee/healed   2. Primary arthritis of right knee.   3. Pes anserine bursitis of right knee    Plan:   1. Patient is interested in proceeding with physical therapy when she returns from her trip.   2. Recommend a corticosterone injection today. Patient would like to proceed.   3. She should continue to alleviate her pain with heat, HEP, avoiding deep bending, and orthotic inserts.    PROCEDURE    Right Knee  Injection - Intraarticular  The patient was informed of the risks and the benefits of the procedure and a written consent was signed.  The patient s right knee was prepped with chlorhexidine in sterile fashion.   40 mg of triamcinolone suspension was drawn up into a 5 mL syringe with 4 mL of 1% lidocaine.  Injection was performed using substerile technique.  A 1.5-inch 22-gauge needle was used to enter the anterolateral aspect of the right knee.  Injection performed successfully without difficulty.  There were no complications. The patient tolerated the procedure well. There was negligible bleeding.   The patient was instructed to ice the knee upon leaving clinic and refrain from overuse over the next 3 days.   The patient was instructed to call or go to the emergency room with any unusual pain, swelling, redness, or if otherwise concerned.  A follow up appointment will be scheduled to evaluate response to the injection, and to assess range of motion and pain.      Large Joint Injection/Arthocentesis  Date/Time: 11/30/2018 1:38 PM  Performed by: WILLIS MELENDEZ  Authorized by: WILLIS MELENDEZ     Indications:  Pain  Needle Size:  22 G  Guidance: landmark guided    Location:  Knee  Site:  R knee joint  Medications:  4 mL lidocaine 1 %; 40 mg triamcinolone 40 MG/ML  Procedure discussed: discussed risks, benefits, and alternatives    Consent Given by:  Patient  Timeout: timeout called immediately prior to procedure    Prep: patient was prepped and draped in usual sterile fashion          Willis GARRETT DO, have reviewed the above note and agree with the scribe's notation as written.    Scribe Disclosure:   Devyn GARRETT, am serving as a scribe to document services personally performed by Willis Melendez DO at this visit, based upon the provider's statements to me. All documentation has been reviewed by the aforementioned provider prior to being entered into the official medical record.

## 2018-11-30 NOTE — LETTER
"2018       RE: Nadira Perez  [address]  [facility] [address]     Dear Colleague,    Thank you for referring your patient, Nadira Perez, to the [facility] at [facility]. Please see a copy of my visit note below.    [facility]  Orthopedics  Willis Borjas, DO  2018     Name: Nadira Perez  MRN: [id number]  Age: 68 year old  : 1950  Referring provider: Referred Self     Chief Complaint: Pain of the Right Knee       History of Present Illness:   Nadira Perez is a 68 year old female with a history of osteopenia and hyperparathyroidism who presents today for follow-up regarding right knee pain. I last evaluated her on 18 at which time she continued to endorse pain at the anterior aspect of the right knee. She felt that the brace may be helping, but was bulky and a nuisance. At that time, we elected to obtain an MRI to investigate possible delayed healing of insufficiency fracture vs. Progression.    Today, she reports that her knee pain feels worse today. She mentions a brief episode of right sided sciatica. Localizes her pain on the medial aspect of her knee that is exacerbated by walking. Has been treating her pain with Vicodin and NSAIDs.     Of note: She is leaving for out of town on Wednesday to [facility].     Review of Systems:   A 10-point review of systems was obtained and is negative except for as noted in the HPI.     Physical Examination:  Height 1.702 m (5' 7\"), weight 87.5 kg (193 lb), not currently breastfeeding.  General  - normal appearance, in no obvious distress  CV  - normal popliteal pulse  Pulm  - normal respiratory pattern, non-labored  Musculoskeletal - right knee  - stance: normal gait without limp, normal single leg squat, no obvious leg length discrepancy  - inspection: no swelling or effusion, normal bone and joint alignment, no obvious deformity  - " palpation: Tender at the medial femoral condyle, medial patellar facet, patellar tendon tenderness, quadricept tendon distally, Pes anserine bursa medially.  - ROM: 135 degrees flexion, -5 degrees extension, not painful, normal actively and passively compared to contralateral  - strength: 5/5 in flexion, 5/5 in extension  - special tests:  (-) Lachman  (-) anterior drawer  (-) posterior drawer  (+) Mackenzie  (-) Thessaly  (-) varus at 0 and 30 degrees flexion  (-) valgus at 0 and 30 degrees flexion  (-) Alex s compression test  Neuro  - no sensory or motor deficit, grossly normal coordination, normal muscle tone  Skin  - no ecchymosis, erythema, warmth, or induration, no obvious rash  Psych  - interactive, appropriate, normal mood and affect     Imaging:  MRI right knee w/o contrast (11/24/18)  1. Resolution of previously noted insufficiency fracture of the tibial  plateau. In addition, marked improvement of medial femoral condyle  subchondral marrow edema, also likely indicate resolution/marked  improvement of additional prior insufficiency fracture.  2. Pes anserine/tibial collateral ligament bursitis, similar to prior.  3. Free edge tear of medial meniscus body/posterior horn, similar to  prior.  4. 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    Per radiology.    I have independently reviewed the above imaging studies; the results were discussed with the patient.     Assessment:   Nadira is a 68 year old following up for a review of MRI results and persistent right knee pain. MRI reveals insufficiency fracture is healed at this point. Therefore, I feel that her symptoms are derived from her medial knee arthritis and generative meniscal tear. Recommend corticosterone injection today and formal physical therapy.    Diagnosis:   1. Insufficiency fracture of right knee/healed   2. Primary arthritis of right knee.   3. Pes anserine bursitis of right knee    Plan:    1. Patient is interested in proceeding with physical therapy when she returns from her trip.   2. Recommend a corticosterone injection today. Patient would like to proceed.   3. She should continue to alleviate her pain with heat, HEP, avoiding deep bending, and orthotic inserts.    PROCEDURE    Right Knee Injection - Intraarticular  The patient was informed of the risks and the benefits of the procedure and a written consent was signed.  The patient s right knee was prepped with chlorhexidine in sterile fashion.   40 mg of triamcinolone suspension was drawn up into a 5 mL syringe with 4 mL of 1% lidocaine.  Injection was performed using substerile technique.  A 1.5-inch 22-gauge needle was used to enter the anterolateral aspect of the right knee.  Injection performed successfully without difficulty.  There were no complications. The patient tolerated the procedure well. There was negligible bleeding.   The patient was instructed to ice the knee upon leaving clinic and refrain from overuse over the next 3 days.   The patient was instructed to call or go to the emergency room with any unusual pain, swelling, redness, or if otherwise concerned.  A follow up appointment will be scheduled to evaluate response to the injection, and to assess range of motion and pain.      Large Joint Injection/Arthocentesis  Date/Time: 11/30/2018 1:38 PM  Performed by: WILLIS MELENDEZ  Authorized by: WILLIS MELENDEZ     Indications:  Pain  Needle Size:  22 G  Guidance: landmark guided    Location:  Knee  Site:  R knee joint  Medications:  4 mL lidocaine 1 %; 40 mg triamcinolone 40 MG/ML  Procedure discussed: discussed risks, benefits, and alternatives    Consent Given by:  Patient  Timeout: timeout called immediately prior to procedure    Prep: patient was prepped and draped in usual sterile fashion          Willis GARRETT DO, have reviewed the above note and agree with the scribe's notation as written.    Scribe Disclosure:   Devyn GARRETT  Rosendo am serving as a scribe to document services personally performed by Willis Borjas DO at this visit, based upon the provider's statements to me. All documentation has been reviewed by the aforementioned provider prior to being entered into the official medical record.               SPORTS & ORTHOPEDIC WALK-IN FOLLOW-UP VISIT 11/30/2018    Interval History:     Follow up reason: R knee MRI     Date of injury: 4/12/18    Date last seen: 11/13/18    Following Therapeutic Plan: Yes     Pain: Unchanged    Function: Unchanged    Interval History: R sided LBP started last weekend, mentions a brief episode of sciatica on R side. Has been taking Vicodin and NSAIDs.    Medical History:    Any recent changes to your medical history? No    Any new medication prescribed since last visit? No       Again, thank you for allowing me to participate in the care of your patient.      Sincerely,    Willis Borjas DO

## 2018-11-30 NOTE — MR AVS SNAPSHOT
After Visit Summary   11/30/2018    Nadira Perez    MRN: [id number]           Patient Information     Date Of Birth          1950        Visit Information        Provider Department      11/30/2018 1:10 PM Willis Borjas DO [facility]        Today's Diagnoses     Primary osteoarthritis of right knee    -  1      Care Instructions    Steroid Injection Information:    A corticosteroid injection was administered at your visit today.  The area of injection may be sore, slightly swollen for 1-2 days afterward.  Immediately after injection, you may have an area of numbness, which is caused by the local anesthetic, lidocaine (similar to novacaine) in the shot.  This medicine will wear off in about 4 hours.  In addition to the lidocaine, a steroid medication was injected, called triamcinolone acetate.  This medication is intended to provide long-acting antiinflammatory / pain relief.  It may take 2-5 days for this medication to provide noticeable relief.      After an injection, Dr. Borjas recommends:      Protecting the area wearing a bandage or gauze pad for at least 24 hours.      Using ice; ice bag or frozen vegetables over the injection site every one to two hours when able (for 15 minutes at a time).        Avoid any strenuous activity even if the knee is already feeling better immediately afterward. Light stretching is encouraged but refrain from home exercise program and increasing activity level for about 48 hours.      Avoid soaking in a hot tub, bath, or pool for 48 hours after injection. Showering is fine!      Watch for signs of infection, including increasing pain, redness and swelling that last more than 48 hours after injection.           Low back pain     What is low back pain?    Low back pain is pain and stiffness in the lower back.  It is one of the most common reasons people miss work.      How does it occur?    Low back pain is usually caused  when a ligament or muscle holding a vertebra in its proper position is strained.  Vertebrae are bones that make up the spinal column through which the spinal cord passes.  When these muscles or ligaments become weak, the spine loses its ability, resulting in pain.  Because nerves reach all parts of the body from the spinal cord, back problems can lead to pain or weakness in almost any part of the body.      Low back pain can occur if your job involves lifting and carrying heavy objects, or if you spend a lot of time sitting or standing in one position or bending over.  It can be caused by a fall or by unusually strenuous exercise.  It can be brought on by the tension and stress that causes headaches in some people.  It can even be brought on by violent sneezing or coughing.      People who are overweight may have low back pain because of the added stress on their back.      Back pain may occur when the muscles, joints, bones and connective tissues in the back become inflamed as a result of an infection or an immune system problem.  Arthritic disorders as well as some congenital and degenerative conditions may cause back pain.      Back pain accompanied by loss of bladder or bowel control, difficulty moving your legs, or numbness or tingling in your arms or legs may indicate an injury to your spine and nerves, which requires immediate medical treatment.      What are the symptoms?    Symptoms include:      Pain in the back or legs    Stiffness and limited motion    The pain may be continuous or may occur in certain positions.  It may be aggravated by coughing, sneezing, bending, twisting, or straining during a bowel movement. The pain may occur in only one spot or may spread to other areas, most commonly down the buttocks and into the back of the thigh.     A low back strain typically does not produce pain past the knee into the calf or foot.  Tingling and numbness in the calf or foot may indicate a herniated disk or  pinched nerve.      How is it diagnosed?    Your healthcare provider will review your medical history and examine you.  He or she may order X-rays.  In certain situations, a myelogram, CT scan, or MRI may be ordered.    How is it treated?    The early stages of back pain with muscle spasms should be treated with ice packs for 20-30 minutes every 4 to 6 hours for the first 2 to 3 days. You m ay lie on a frozen gel pack, crushed ice, or a bag of frozen peas.    The following are always to treat low back pain:      After the initial injury, applying heat from a heating pad or hot water bottle    Resting in bed on a firm mattress.  Often it helps to lie on your back with your knees raised.  However, isome people prefer to lie on their side with their knees bent.      Taking aspirin, ibuprofen, or other anti-inflammatory medications; muscle relaxants; or other pain medications if recommended by your healthcare provider (adults aged 65 years and older should not take non-steroidal anti-inflammatory medicine for more than 7 days without their healthcare provider's approval).    Having your back massaged by a trained person    Having traction, if recommended by your provider    Wearing a belt or corset to support your back    Talking with a counselor, if your back pain is related to tension caused by emotional problems.    Beginning a program of physical therapy, or exercising on your own.  Begin a regular exercise program to gently stretch and strengthen your muscles as soon as you can.  Your healthcare provider or physical therapist can recommend exercises that will not only help you feel better but will strengthen your muscles and help avoid back trouble later.      When the pain subsides, ask your healthcare provider about starting an exercise program such as the following:       Exercise moderately every day, using stretching and warm-up exercises suggested by your provider or physical therapist.    Exercise vigorously  for about 30 minutes two or three times a week by walking, swimming, using a stationary bicycle, or doing low-impact aerobics.    Participating regularly in an exercise program will not only help your back, it will also help keep you healthier overall.     How long will the effects last?      The effects of back pain last as long as the cause exists or until your body recovers from the strain, usually a day or two but sometimes weeks.     How can I take care of myself?    In addition to the treatment described above, keep in mind these suggestions:      Use an electric heating pad on a low setting (or a hot water bottle wrapped in a towel to avoid burning yourself) for 20 to 30 minutes.  Don't let the heating pad get too hot, and don't fall asleep with it.  You could get a burn.     Try putting an ice pack wrapped in a towel on your back for 20 minutes, one to four times a day.  Set an alarm to avoid frostbite from using the ice pack too long.    Put a pillow under your knees when you are lying down.    Sleep without a pillow under your head.    Lose weight if you are overweight.    Practice good posture.  Stand with your head up, shoulders straight and chest forward, weight balanced evenly on both feet, and pelvis tucked in.     Pain is the best way to  the pace you should set in increasing your activity and exercise.  Minor discomfort, stiffness, soreness, and mild aches need not interfere with activity.  However, limit your activity temporarily if:      your symptoms return    the pain increases when you are more active    the pain increases within 24 hours after a new or higher level of activity    When can I return to my sport or activity?    The goal of rehabilitation is to return you to your sport or activity as soon as safely possible. If you return too soon you may worsen your injury, which could lead to permanent damage.  Everyone recovers from injury at different rate.  Return to your sport will be  determined by how soon your back recovers, not by how many days or weeks it has been since your injury occurred.  In general, the longer you have symptoms before you start treatment, the longer it will take to get better.      It is important that you have fully recovered from your low back pain before you return to your sport or any strenuous activity.  You must be able to have the same range of motion that you had before your injury.  You must be able to run, jump and twist without pain.      What can I do to help prevent low back pain?    You can reduce the strain on your back by doing the following:      Don't push with your arms when you move a heavy object.  Turn around and push backwards so the strain is taken by your legs.     Whenever you sit, sit in a straight-backed chair and hold your spine against the back of the chair.     Bend your knees and hips and keep your back straight when you lift a heavy object.     Avoid lifting heavy objects higher than your waist    Hold packages you carry close to your body, with your arms bent.    Use a footrest for one foot when you stand or sit in one spot for a long time.  This keeps your back straight.    Bend your knees when you bend over.    Sit close to the pedals when you drive and use your seat belt and a hard backrest or pillow.     Lie on your side with your knees bent when you sleep or rest.  It may help to put a pillow between your knees.    Put a pillow under your knees when you sleep on your back.    Raise the foot of the bed 8 inches to discourage sleeping on your stomach unless you have other problems that require that you keep your head elevated.      To rest your back, hold each of these positions for 5 minutes or longer:  Lie on your back, bend your knees, and put pillows under your knees.    Lie on your back, put a pillow under your neck, bend your knees to a 90-degree angle, and put your lower legs and feet on a chair.    Lie on your back, bend your  knees, and bring one knee up to your chest and hold it there.  Repeat with the other knee, then bring both knees to your chest.  When holding your knee to your chest, grab your thigh rather than your lower leg to avoid over flexing your knee.           Exercises that stretch and strengthen the muscles of your abdomen and spine can help prevent back problems. Strong back and abdominal muscles help you keep good posture, with your spine in its correct position.    If your muscles are tight, take a warm shower or bath before doing the exercises. Exercise on a rug or mat. Wear loose clothing. Don t wear shoes. Stop doing any exercise that causes pain until you have talked with your healthcare provider.    Ask your provider or physical therapist to help you develop an exercise program. Ask your provider how many times a week you need to do the exercises. Remember to start slowly.    Excerpts from: The Sports Medicine Patient Advisor 3rd edition. Copyright 2010 [employer].     Low Back Pain Exercises    EXERCISES  These exercises are intended only as suggestions. Be sure to check with your provider before starting the exercises.    Standing hamstring stretch: Put the heel of one leg on a stool about 15 inches high. Keep your leg straight. Lean forward, bending at the hips until you feel a mild stretch in the back of your thigh. Make sure you do not roll your shoulders or bend at the waist when doing this. You want to stretch your leg, not your lower back. Hold the stretch for 15 to 30 seconds. Repeat with each leg 3 times.    Cat and camel: Get down on your hands and knees. Let your stomach sag, allowing your back to curve downward. Hold this position for 5 seconds. Then arch your back and hold for 5 seconds. Do 2 sets of 15.    Quadruped arm and leg raise: Get down on your hands and knees. Pull in your belly button and tighten your abdominal muscles to stiffen your spine. While keeping your abdominals tight,  raise one arm and the opposite leg away from you. Hold this position for 5 seconds. Lower your arm and leg slowly and change sides. Do this 10 times on each side.    Pelvic tilt: Lie on your back with your knees bent and your feet flat on the floor. Pull your belly button in towards your spine and push your lower back into the floor, flattening your back. Hold this position for 15 seconds, then relax. Repeat 5 to 10 times.  Partial curl: Lie on your back with your knees bent and your feet flat on the floor. Draw in your abdomen and tighten your stomach muscles. With your hands stretched out in front of you, curl your upper body forward until your shoulders clear the floor. Hold this position for 3 seconds. Don't hold your breath. It helps to breathe out as you lift your shoulders. Relax back to the floor. Repeat 10 times. Build to 2 sets of 15. To challenge yourself, clasp your hands behind your head and keep your elbows out to your sides.    Gluteal stretch: Lie on your back with both knees bent. Rest your right ankle over the knee of your left leg. Grasp the thigh of the left leg and pull toward your chest. You will feel a stretch along the buttocks and possibly along the outside of your hip. Hold the stretch for 15 to 30 seconds. Then repeat the exercise with your left ankle over your right knee. Do the exercise 3 times with each leg.    Extension exercise  Lie face down on the floor for 5 minutes. If this hurts too much, lie face down with a pillow under your stomach. This should relieve your leg or back pain. When you can lie on your stomach for 5 minutes without a pillow, you can continue with Part B of this exercise.    After lying on your stomach for 5 minutes, prop yourself up on your elbows for another 5 minutes. If you can do this without having more leg or buttock pain, you can start doing part C of this exercise.    Lie on your stomach with your hands under your shoulders. Then press down on your hands  and extend your elbows while keeping your hips flat on the floor. Hold for 1 second and lower yourself to the floor. Do 3 to 5 sets of 10 repetitions. Rest for 1 minute between sets. You should have no pain in your legs when you do this, but it is normal to feel some pain in your lower back.    Do this exercise several times a day.    Side plank: Lie on your side with your legs, hips, and shoulders in a straight line. Prop yourself up onto your forearm with your elbow directly under your shoulder. Lift your hips off the floor and balance on your forearm and the outside of your foot. Try to hold this position for 15 seconds and then slowly lower your hip to the ground. Switch sides and repeat. Work up to holding for 1 minute. This exercise can be made easier by starting with your knees and hips flexed toward your chest.    EXERCISES TO AVOID     It s best to avoid the following exercises because they strain the lower back:    Exercises in which you lie on your back and raise and lower both legs together    Full sit-ups or sit-ups with straight legs    Hip twists    SPORTS AND OTHER ACTIVITIES    In addition to strengthening your back muscles, it s helpful to keep your entire body in shape. Good activities for people with back problems include:      Walking    Bicycling    Swimming    Cross-country skiing    Yoga    Sreekanth Chi    Pilates    Some sports can hurt your back because of rough contact, twisting, sudden impact, or direct stress on your back. Sports that may be dangerous to your back include:      Football    Soccer    Volleyball    Handball    Golf    Weight lifting    Trampoline    Tobogganing    Sledding    Snowmobiling    Snowboarding    Ice hockey            Try lifting both knees to chest as well. This is a great stretch first thing in the morning, while still in bed.           Follow-ups after your visit        Additional Services     PHYSICAL THERAPY REFERRAL (Internal)       Physical Therapy Referral.  Medial meniscal tear, evaluate and treat right knee.  Consider METEOR protocol.     Patient should progress through each stage of this protocol in sequence  If unable to progress, patient should follow up in my office for reevaluation.     Phase I - Acute Phase (1-10 days)  Goals: Decrease inflammation, restore A/PROM, neuromuscular re-education of quadriceps. Perform at least 8 exercises, 12-15 repetitions, 1-2 sets of the following types of exercises    Inflammation:  Retrograde Massage  Cryotherapy    E-Stim:   NMES or IFC    Manual Therapy:  Joint mobilization  Soft Tissue Mobilization  Stretching LE Muscles    Open Chain:  Quad sets  SAQ/LAQ/HS Curls  Hip - 4 way    Closed Chain:  Bicycle, elliptical, treadmill, leg press, balance/proprioception.    Progression criteria to Phase II (3 of the 4 criteria):  Knee A/PROM >115 degrees  Moderate to minimal effusion  Knee pain < 4/10  Muscle strength >3/5    Phase II - Subacute Phase (10 days - 4 weeks)  Goals: Decrease inflammation, restore A/PROM, neuromuscular re-education of quadriceps  Perform at least 8 exercises, 12-15 repetitions, 1-2 sets of the following types of exercises:    Inflammation:  Retrograde Massage  Cryotherapy    E-Stim:   NMES or IFC    Manual Therapy:  Joint mobilization  Soft Tissue Mobilization  Stretching LE Muscles    Open Chain:  Add more eccentric/concentric hip/knee progressive resistance exercises, ROM    Closed Chain:  Resisted terminal knee extension, modified mini-squats, step-up / step-down progression, toe raises, functional and agility training    Progression criteria to Phase III (3 of the 4 criteria):  Knee A/PROM >125 degrees  Minimal effusion  Knee pain < 2/10  Muscle strength 5/5    Phase III - Advance Activity Phase (4 weeks - 7 weeks)  Goals: Enhance muscle strength and endurance, maintain full ROM, return to sport/activites  Perform at least 8 exercises, 12-15 repetitions, 1-2 sets of the following types of  exercises:    Continue stretching program  Continue therapeutic home exercise program  Emphasis on closed chain program with progression to dynamic single leg stance, plyometrics, running and sport specific training.                  Your next 10 appointments already scheduled     Dec 14, 2018 12:00 PM CST   Lymphedema Treatment with Maya Anaya, PT   [facility])    [address] [facility]  1st Floor  [address]  [facility] [address]   [phone]            Dec 17, 2018 12:00 PM CST   Lymphedema Treatment with Maya Anaya, PT   [facility] ([facility])    [address] [facility]  1st Floor  [address]  [facility] [address]   [phone]            Dec 20, 2018  9:50 AM CST   (Arrive by 9:35 AM)   RETURN HAND with Lien Prajapati MD   [facility] ([facility])    [address]  4th Floor  [facility] [address]   [phone]            Dec 21, 2018  9:45 AM CST   Lymphedema Treatment with Maya Anaya, PT   [facility] ([facility])    [address] [facility]  1st Floor  [address]  [facility] [address]   [phone]            Dec 21, 2018 12:15 PM CST   RETURN CORNEA with Tyson Ruby MD   [facility] ([facility])    [address]  [address] [address]  [facility] [address]   [phone]            May 13, 2019 11:00 AM CDT   [facility] Lab Draw with  [facility] DRAW   [facility] ([facility])    [address]  [address]  [facility] [address] [phone]            May 13, 2019 11:30 AM CDT   (Arrive by 11:15 AM)   Return Visit with Khushbu Louise MD   [facility] ([facility])     "[address]  [address]  [facility] [address] [phone]            May 13, 2019 12:00 PM CDT   Infusion 60 with UC ONCOLOGY INFUSION, UC 28 ATC   [facility] ([facility])    [address]  [address]  [facility] [id number] [phone]              Future tests that were ordered for you today     Open Future Orders        Priority Expected Expires Ordered    PHYSICAL THERAPY REFERRAL (Internal) Routine  11/30/2019 11/30/2018            Who to contact     Please call your clinic at [phone] to:    Ask questions about your health    Make or cancel appointments    Discuss your medicines    Learn about your test results    Speak to your doctor            Additional Information About Your Visit        [employer] Information     [employer] gives you secure access to your electronic health record. If you see a primary care provider, you can also send messages to your care team and make appointments. If you have questions, please call your primary care clinic.  If you do not have a primary care provider, please call [phone] and they will assist you.      [employer] is an electronic gateway that provides easy, online access to your medical records. With [employer], you can request a clinic appointment, read your test results, renew a prescription or communicate with your care team.     To access your existing account, please contact your [facility] or call [phone] for assistance.        Care EveryWhere ID     This is your Care EveryWhere ID. This could be used by other organizations to access your [facility] medical records  [id number]        Your Vitals Were     Height BMI (Body Mass Index)                1.702 m (5' 7\") 30.23 kg/m2           Blood Pressure from Last 3 Encounters:   11/13/18 137/85   11/13/18 137/85   10/18/18 142/88    Weight from Last 3 Encounters:   11/30/18 87.5 kg (193 lb)   11/13/18 87.5 kg (193 " lb)   11/13/18 87.9 kg (193 lb 11.2 oz)                 Today's Medication Changes          These changes are accurate as of 11/30/18  1:23 PM.  If you have any questions, ask your nurse or doctor.               These medicines have changed or have updated prescriptions.        Dose/Directions    amLODIPine 5 MG tablet   Commonly known as:  NORVASC   This may have changed:  how much to take   Used for:  Essential hypertension, benign        Dose:  5 mg   Take 1 tablet (5 mg) by mouth At Bedtime   Quantity:  90 tablet   Refills:  3       cholecalciferol 5000 units (125 mcg) capsule   Commonly known as:  VITAMIN D3   This may have changed:  additional instructions   Used for:  Vaginal dryness        Dose:  5000 Units   Take 1 capsule (5,000 Units) by mouth daily   Quantity:  120 capsule   Refills:  3       lisinopril 20 MG tablet   Commonly known as:  PRINIVIL/ZESTRIL   This may have changed:  how much to take   Used for:  Benign essential hypertension        Dose:  20 mg   Take 1 tablet (20 mg) by mouth daily   Quantity:  30 tablet   Refills:  3            Where to get your medicines      Some of these will need a paper prescription and others can be bought over the counter.  Ask your nurse if you have questions.     Bring a paper prescription for each of these medications     order for DME                Primary Care Provider Office Phone # Fax #    Matilde MARCELINO Quiñonez, APRN [phone]       [address] [place] [address] [address] [place] [address]        Equal Access to Services     EDEL LAMA AH: Sonali sousao Sosukhdev, waaxda luqadaha, qaybta kaalmada adejoyda, cierra daugherty. So [facility] [phone].    ATENCIÓN: Si habla español, tiene a davis disposición servicios gratuitos de asistencia lingüística. Garrick al [phone].    We comply with applicable federal civil rights laws and [place] laws. We do not discriminate on the basis of race, color, national origin, age,  disability, sex, sexual orientation, or gender identity.            Thank you!     Thank you for choosing [facility] W[facility] IN [facility]  for your care. Our goal is always to provide you with excellent care. Hearing back from our patients is one way we can continue to improve our services. Please take a few minutes to complete the written survey that you may receive in the mail after your visit with us. Thank you!             Your Updated Medication List - Protect others around you: Learn how to safely use, store and throw away your medicines at www.disposemymeds.org.          This list is accurate as of 11/30/18  1:23 PM.  Always use your most recent med list.                   Brand Name Dispense Instructions for use Diagnosis    amLODIPine 5 MG tablet    NORVASC    90 tablet    Take 1 tablet (5 mg) by mouth At Bedtime    Essential hypertension, benign       anastrozole 1 MG tablet    ARIMIDEX    90 tablet    Take 1 tablet (1 mg) by mouth daily    Malignant neoplasm of left breast in female, estrogen receptor positive, unspecified site of breast (H)       APAP 500 PO      Take 1,000 mg by mouth 2 times daily        atorvastatin 40 MG tablet    LIPITOR    90 tablet    Take 1 tablet (40 mg) by mouth daily Please put on profile- pt just received 90-day supply of cholesterol meds    Pure hypercholesterolemia       cholecalciferol 5000 units (125 mcg) capsule    VITAMIN D3    120 capsule    Take 1 capsule (5,000 Units) by mouth daily    Vaginal dryness       CoQ10 200 MG Caps      Take 1 capsule by mouth daily        * diclofenac 1 % topical gel    VOLTAREN    100 g    Apply 4 grams to knees or 2 grams to hands four times daily using enclosed dosing card.    Primary osteoarthritis of both wrists       * VOLTAREN 1 % topical gel   Generic drug:  diclofenac     100 g    Apply 4 grams to knees or 2 grams to hands four times daily using enclosed dosing card.    Right knee pain, unspecified chronicity        gabapentin 300 MG capsule    NEURONTIN    540 capsule    Take 1-2 capsules by mouth every 8 hours    Neuropathic pain       glucosamine-chondroitin 500-400 MG Caps per capsule      Take 1 capsule by mouth 2 times daily        HYDROcodone-acetaminophen 5-325 MG tablet    NORCO    40 tablet    Take 1 tablet by mouth every 6 hours as needed    Chronic left-sided low back pain, with sciatica presence unspecified       ketoconazole 2 % external shampoo    NIZORAL    120 mL    Apply topically daily as needed for itching or irritation    Dermatitis, seborrheic       levothyroxine 112 MCG tablet    SYNTHROID/LEVOTHROID    45 tablet    Take 0.5 tab daily    Hypothyroidism, unspecified type       lisinopril 20 MG tablet    PRINIVIL/ZESTRIL    30 tablet    Take 1 tablet (20 mg) by mouth daily    Benign essential hypertension       LORazepam 2 MG tablet    ATIVAN    30 tablet    Take 1 tablet at night for sleep    Sleep disorder       * metoprolol succinate ER 25 MG 24 hr tablet    TOPROL-XL    90 tablet    Take 1 tablet (25 mg) by mouth daily    Irregular heart rate       * metoprolol succinate ER 25 MG 24 hr tablet    TOPROL-XL    90 tablet    Take 1 tablet (25 mg) by mouth daily    Irregular heart rate       Multi-vitamin tablet   Generic drug:  multivitamin w/minerals      Take 1 tablet by mouth daily.        OMEGA-3 FISH OIL PO      Take 1 capsule by mouth daily        ondansetron 4 MG ODT tab    ZOFRAN-ODT    90 tablet    1-2 tabs po prn every 6 hours    Nausea       order for DME     2 each    Equipment being ordered: prosthetic breasts    H/O bilateral mastectomy       order for DME     1 each    Equipment being ordered: sleeve/gauntlet/glove 20-40 mmHg, lymphedema bandaging supplies    Lymphedema       potassium chloride ER 20 MEQ CR tablet    K-DUR/KLOR-CON M    8 tablet    Take 1 tablet (20 mEq) by mouth 2 times daily    Hypopotassemia       REPHRESH Gel     14 Box    Place 2 g vaginally every 3 days    Urinary tract  infection, site not specified       terbinafine 1 % external cream    lamISIL AT    24 g    Apply topically 2 times daily For fungal infection not resolved with other antifungals (e.g. Clotrimazole)    Tinea pedis of both feet       triamcinolone 0.1 % external ointment    KENALOG    30 g    Apply topically 2 times daily    Eczema, unspecified type       triamterene-HCTZ 37.5-25 MG tablet    MAXZIDE-25    90 tablet    Take 1 tablet by mouth daily    Essential hypertension, benign       * Notice:  This list has 4 medication(s) that are the same as other medications prescribed for you. Read the directions carefully, and ask your doctor or other care provider to review them with you.

## 2018-12-03 RX ORDER — TRIAMCINOLONE ACETONIDE 40 MG/ML
40 INJECTION, SUSPENSION INTRA-ARTICULAR; INTRAMUSCULAR
Status: DISCONTINUED | OUTPATIENT
Start: 2018-11-30 | End: 2019-06-24

## 2018-12-03 RX ORDER — LIDOCAINE HYDROCHLORIDE 10 MG/ML
4 INJECTION, SOLUTION INFILTRATION; PERINEURAL
Status: DISCONTINUED | OUTPATIENT
Start: 2018-11-30 | End: 2020-05-12

## 2018-12-04 ENCOUNTER — MEDICAL CORRESPONDENCE (OUTPATIENT)
Dept: HEALTH INFORMATION MANAGEMENT | Facility: CLINIC | Age: 68
End: 2018-12-04

## 2018-12-12 DIAGNOSIS — M25.539 WRIST PAIN: Primary | ICD-10-CM

## 2018-12-14 ENCOUNTER — HOSPITAL ENCOUNTER (OUTPATIENT)
Dept: PHYSICAL THERAPY | Facility: CLINIC | Age: 68
Setting detail: THERAPIES SERIES
End: 2018-12-14
Attending: NURSE PRACTITIONER
Payer: MEDICARE

## 2018-12-14 PROCEDURE — 97140 MANUAL THERAPY 1/> REGIONS: CPT | Mod: GP | Performed by: PHYSICAL THERAPIST

## 2018-12-17 ENCOUNTER — HOSPITAL ENCOUNTER (OUTPATIENT)
Dept: PHYSICAL THERAPY | Facility: CLINIC | Age: 68
Setting detail: THERAPIES SERIES
End: 2018-12-17
Attending: NURSE PRACTITIONER
Payer: MEDICARE

## 2018-12-17 PROCEDURE — 97110 THERAPEUTIC EXERCISES: CPT | Mod: GP | Performed by: PHYSICAL THERAPIST

## 2018-12-17 PROCEDURE — G8989 SELF CARE D/C STATUS: HCPCS | Mod: GP,CI | Performed by: PHYSICAL THERAPIST

## 2018-12-17 PROCEDURE — G8988 SELF CARE GOAL STATUS: HCPCS | Mod: GP,CH | Performed by: PHYSICAL THERAPIST

## 2018-12-17 PROCEDURE — 97140 MANUAL THERAPY 1/> REGIONS: CPT | Mod: GP | Performed by: PHYSICAL THERAPIST

## 2018-12-20 ENCOUNTER — OFFICE VISIT (OUTPATIENT)
Dept: ORTHOPEDICS | Facility: CLINIC | Age: 68
End: 2018-12-20
Payer: OTHER MISCELLANEOUS

## 2018-12-20 ENCOUNTER — ANCILLARY PROCEDURE (OUTPATIENT)
Dept: GENERAL RADIOLOGY | Facility: CLINIC | Age: 68
End: 2018-12-20
Attending: ORTHOPAEDIC SURGERY
Payer: MEDICARE

## 2018-12-20 DIAGNOSIS — M19.142 POST-TRAUMATIC OSTEOARTHRITIS OF BOTH HANDS: Primary | ICD-10-CM

## 2018-12-20 DIAGNOSIS — M25.539 WRIST PAIN: ICD-10-CM

## 2018-12-20 DIAGNOSIS — M19.141 POST-TRAUMATIC OSTEOARTHRITIS OF BOTH HANDS: Primary | ICD-10-CM

## 2018-12-20 NOTE — PROGRESS NOTES
[facility]  Orthopedics  Lien Prajapati MD  2018     Name: Nadira Perez  MRN: [id number]  Age: 68 year old  : 1950  Referring provider: Referred Self     Chief Complaint: RECHECK (bilateral wrist left worse than right)     Date of Injury: chronic    History of Present Illness:   Nadira Perez is a 68 year old, right handed female workers compensation patient who presents today for follow-up regarding bilateral hand pain (L>>R). I last evaluated the patient on 2017, at which time the patient reported limited ROM secondary to pain. She also had visible DJD of both wrists. We elected to continue with bracing. The patient presents today to obtain new hand splints. She states that her symptoms are approximately the same since her last visit. She rates her pain on the right as a 6/10 and a 4/10 on the left that gets reduced by 2 with the use of ibuprofen.     Review of Systems:   A 10-point review of systems was obtained and is negative except for as noted in the HPI.     Physical Examination:  Strength:  R hand: 30.6 pounds  L hand: 27.6 pounds  General: Healthy appearing female. Affect appropriate. Normal gait. Alert and oriented to surroundings.   Bilateral Upper Extremity:   Right - 45 degrees of extension and 70 degrees of flexion.   Left - 45 degrees of extension and 75 degrees of flexion     Imaging:  Radiographs of the bilateral wrists - 3 views (2018)  Bilateral four corner fusion. Metal intact, no change in position.  No progression of arthritis     I have independently reviewed the above imaging studies; the results were discussed with the patient.     Assessment:   68 year old, right handed female with bilateral wrist DJD.     Plan:   Patient obtained bilateral splint in clinic. Follow up with true SOLORZANON. All questions were answered in clinic.     Scribe Disclosure:   I, Tyson Aguero, am serving as a scribe to document services personally performed by Lien Villa  MD Sebastian at this visit, based upon the provider's statements to me. All documentation has been reviewed by the aforementioned provider prior to being entered into the official medical record.  Lien Prajapati MD   Hand and Upper Extremity Specialist  [facility]

## 2018-12-20 NOTE — NURSING NOTE
Reason For Visit:   Chief Complaint   Patient presents with     RECHECK     bilateral wrist left worse than right       Primary MD: Matilde Quiñonez  Ref. MD: self    Age: 68 year old    ?  No      There were no vitals taken for this visit.      Pain Assessment  Patient Currently in Pain: Yes  0-10 Pain Scale: (left writs 6 right 4)  Primary Pain Location: Wrist  Pain Orientation: Left, Right  Pain Descriptors: Aching  Alleviating Factors: NSAIDS, Heat  Aggravating Factors: Movement    Hand Dominance Evaluation  Hand Dominance: Right          QuickDASH Assessment  QuickDA Main 12/22/2016   1.Open a tight or new jar. Severe difficulty   2. Do heavy household chores (e.g., wash walls, floors) Moderate difficulty   3. Carry a shopping bag or briefcase. Moderate difficulty   4. Wash your back. Mild difficulty   5. Use a knife to cut food. Mild difficulty   6. Recreational activities in which you take some force or impact through your arm, shoulder or hand (e.g., golf, hammering, tennis, etc.). Unable   7. During the past week, to what extent has your arm, shoulder or hand problem interfered with your normal social activities with family, friends, neighbours or groups? Slightly   8. During the past week, were you limited in your work or other regular daily activities as a result of your arm, shoulder or hand problem? Slightly limited   9. Arm, shoulder or hand pain. Moderate   10.Tingling (pins and needles) in your arm,shoulder or hand. None   11. During the past week, how much difficulty have you had sleeping because of the pain in your arm, shoulder or hand? ([place] number) No difficulty   Quickdash Ability Score 38.63   1. Open a tight or new jar. -   2. Do heavy household chores (e.g., wash walls, floors). -   3. Carry a shopping bag or briefcase. -   4. Wash your back. -   5. Use a knife to cut food. -   6. Recreational activities in which you take some force or impact through your arm, shoulder or  hand (e.g., golf, hammering, tennis, etc.). -   7. During the past week, to what extent has your arm, shoulder or hand problem interfered with your normal social activities with family, friends, neighbours or groups? -   8. During the past week, were you limited in your work or other regular daily activities as a result of your arm, shoulder or hand problem? -   9. Arm, shoulder or hand pain. -   10. Tingling (pins and needles) in your arm,shoulder or hand. -   11. During the past week, how much difficulty have you had sleeping because of the pain in your arm, shoulder or hand? ([place] number) -   SUM: -          Current Outpatient Medications   Medication Sig Dispense Refill     Acetaminophen (APAP 500 PO) Take 1,000 mg by mouth 2 times daily       amLODIPine (NORVASC) 5 MG tablet Take 1 tablet (5 mg) by mouth At Bedtime (Patient taking differently: Take 10 mg by mouth At Bedtime ) 90 tablet 3     anastrozole (ARIMIDEX) 1 MG tablet Take 1 tablet (1 mg) by mouth daily 90 tablet 3     atorvastatin (LIPITOR) 40 MG tablet Take 1 tablet (40 mg) by mouth daily Please put on profile- pt just received 90-day supply of cholesterol meds 90 tablet 3     cholecalciferol (VITAMIN D3) 5000 UNITS CAPS capsule Take 1 capsule (5,000 Units) by mouth daily (Patient taking differently: Take 5,000 Units by mouth daily 6000 Units Daily) 120 capsule 3     Coenzyme Q10 (COQ10) 200 MG CAPS Take 1 capsule by mouth daily       diclofenac (VOLTAREN) 1 % GEL topical gel Apply 4 grams to knees or 2 grams to hands four times daily using enclosed dosing card. 100 g 1     gabapentin (NEURONTIN) 300 MG capsule Take 1-2 capsules by mouth every 8 hours 540 capsule 3     glucosamine-chondroitin 500-400 MG CAPS per capsule Take 1 capsule by mouth 2 times daily        HYDROcodone-acetaminophen (NORCO) 5-325 MG per tablet Take 1 tablet by mouth every 6 hours as needed 40 tablet 0     ketoconazole (NIZORAL) 2 % shampoo Apply topically daily as needed for  itching or irritation 120 mL 11     levothyroxine (SYNTHROID/LEVOTHROID) 112 MCG tablet Take 0.5 tab daily 45 tablet 3     lisinopril (PRINIVIL/ZESTRIL) 20 MG tablet Take 2 tablets (40 mg) by mouth daily 90 tablet 1     LORazepam (ATIVAN) 2 MG tablet Take 1 tablet at night for sleep 30 tablet 1     metoprolol succinate (TOPROL-XL) 25 MG 24 hr tablet Take 1 tablet (25 mg) by mouth daily 90 tablet 2     metoprolol succinate (TOPROL-XL) 25 MG 24 hr tablet Take 1 tablet (25 mg) by mouth daily 90 tablet 0     Multiple Vitamin (MULTI-VITAMIN) per tablet Take 1 tablet by mouth daily.       Omega-3 Fatty Acids (OMEGA-3 FISH OIL PO) Take 1 capsule by mouth daily        ondansetron (ZOFRAN-ODT) 4 MG ODT tab 1-2 tabs po prn every 6 hours 90 tablet 2     order for DME Equipment being ordered: sleeve/gauntlet/glove 20-40 mmHg, lymphedema bandaging supplies 1 each 12     order for DME Equipment being ordered: prosthetic breasts 2 each 12     potassium chloride SA (K-DUR/KLOR-CON M) 20 MEQ CR tablet Take 1 tablet (20 mEq) by mouth 2 times daily 8 tablet 3     terbinafine (LAMISIL AT) 1 % cream Apply topically 2 times daily For fungal infection not resolved with other antifungals (e.g. Clotrimazole) 24 g 11     triamcinolone (KENALOG) 0.1 % ointment Apply topically 2 times daily 30 g 11     triamterene-hydrochlorothiazide (MAXZIDE-25) 37.5-25 MG per tablet Take 1 tablet by mouth daily 90 tablet 3     Vaginal Lubricant (REPHRESH) GEL Place 2 g vaginally every 3 days 14 Box 3     VOLTAREN 1 % GEL topical gel Apply 4 grams to knees or 2 grams to hands four times daily using enclosed dosing card. 100 g 1       Allergies   Allergen Reactions     Penicillins Deborah Romano, ATC

## 2018-12-20 NOTE — LETTER
2018       RE: Nadira Perez  [address]  [facility] [address]     Dear Colleague,    Thank you for referring your patient, Nadira Perez, to the [facility]. Please see a copy of my visit note below.    [facility]  Orthopedics  Lien Prajapati MD  2018     Name: Nadira Perez  MRN: [id number]  Age: 68 year old  : 1950  Referring provider: Referred Self     Chief Complaint: RECHECK (bilateral wrist left worse than right)     Date of Injury: chronic    History of Present Illness:   Nadira Perez is a 68 year old, right handed female workers compensation patient who presents today for follow-up regarding bilateral hand pain (L>>R). I last evaluated the patient on 2017, at which time the patient reported limited ROM secondary to pain. She also had visible DJD of both wrists. We elected to continue with bracing. The patient presents today to obtain new hand splints. She states that her symptoms are approximately the same since her last visit. She rates her pain on the right as a 6/10 and a 4/10 on the left that gets reduced by 2 with the use of ibuprofen.     Review of Systems:   A 10-point review of systems was obtained and is negative except for as noted in the HPI.     Physical Examination:  Strength:  R hand: 30.6 pounds  L hand: 27.6 pounds  General: Healthy appearing female. Affect appropriate. Normal gait. Alert and oriented to surroundings.   Bilateral Upper Extremity:   Right - 45 degrees of extension and 70 degrees of flexion.   Left - 45 degrees of extension and 75 degrees of flexion     Imaging:  Radiographs of the bilateral wrists - 3 views (2018)  Bilateral four corner fusion. Metal intact, no change in position.  No progression of arthritis     I have independently reviewed the above imaging studies; the results were discussed with the patient.     Assessment:   68 year old,  right handed female with bilateral wrist DJD.     Plan:   Patient obtained bilateral splint in clinic. Follow up with me PRN. All questions were answered in clinic.     Scribe Disclosure:   I, Tyson Aguero, am serving as a scribe to document services personally performed by Lien Prajapati MD at this visit, based upon the provider's statements to me. All documentation has been reviewed by the aforementioned provider prior to being entered into the official medical record.  Lien Prajapati MD   Hand and Upper Extremity Specialist  [facility]

## 2018-12-21 ENCOUNTER — OFFICE VISIT (OUTPATIENT)
Dept: OPHTHALMOLOGY | Facility: CLINIC | Age: 68
End: 2018-12-21
Attending: OPHTHALMOLOGY
Payer: MEDICARE

## 2018-12-21 DIAGNOSIS — Z98.890 HISTORY OF RADIAL KERATOTOMY: ICD-10-CM

## 2018-12-21 DIAGNOSIS — H17.9 CORNEAL SCARS, BOTH EYES: ICD-10-CM

## 2018-12-21 DIAGNOSIS — H18.529 ANTERIOR BASEMENT MEMBRANE DYSTROPHY: ICD-10-CM

## 2018-12-21 DIAGNOSIS — Z96.1 PSEUDOPHAKIA OF BOTH EYES: ICD-10-CM

## 2018-12-21 DIAGNOSIS — H26.491 POSTERIOR CAPSULE OPACIFICATION, RIGHT: Primary | ICD-10-CM

## 2018-12-21 PROCEDURE — 66821 AFTER CATARACT LASER SURGERY: CPT | Mod: RT,ZF | Performed by: OPHTHALMOLOGY

## 2018-12-21 PROCEDURE — G0463 HOSPITAL OUTPT CLINIC VISIT: HCPCS | Mod: ZF

## 2018-12-21 ASSESSMENT — SLIT LAMP EXAM - LIDS
COMMENTS: NORMAL
COMMENTS: NORMAL

## 2018-12-21 ASSESSMENT — REFRACTION_WEARINGRX
OD_AXIS: 005
OS_AXIS: 013
OD_CYLINDER: +1.75
SPECS_TYPE: PAL
OS_SPHERE: +6.75
OD_SPHERE: +7.25
OS_ADD: +2.25
OS_CYLINDER: +2.00
OD_ADD: +2.25

## 2018-12-21 ASSESSMENT — EXTERNAL EXAM - RIGHT EYE: OD_EXAM: NORMAL

## 2018-12-21 ASSESSMENT — TONOMETRY
OD_IOP_MMHG: 19
OS_IOP_MMHG: 17
IOP_METHOD: ICARE

## 2018-12-21 ASSESSMENT — VISUAL ACUITY
OD_SC+: -2
OS_SC: 20/40
OS_PH_SC+: -1
METHOD: SNELLEN - LINEAR
OD_PH_SC: 20/20
OD_SC: 20/25
OS_PH_SC: 20/30

## 2018-12-21 ASSESSMENT — EXTERNAL EXAM - LEFT EYE: OS_EXAM: NORMAL

## 2018-12-21 NOTE — PROGRESS NOTES
CC: s/p CE/IOL OU      HPI: 68 yo CF s/p CE/IOL OS. Surgery complicated by opening of RK wounds temporally. Resutured. Doing well, denies any pain, feels like vision is improving. No flashes, diplopia. She has noticed more floaters OD      Interval:  Doing well since last visit - sutures removed left eye. Used ofloxacin x 4 days, then stopped. No pain or discomfort. Vision still slightly off each eye. Using reading glasses OTC PRN.      POHx:  H/o RK  S/p CE/IOL OS 2/20/18  S/p CE/IOL OD 3/13/18      Gtts:  PF AT PRN  -did 4 days of ofloxacin left eye after suture removal last visit      Assessment / Plan:      1. S/p CE/IOL both eyes  - mild PCO OD  - recommend YAG OD - r/b/a discussed, patient wishes to proceed    2. ABMD, each eye   -s/p suture removal left eye last visit  -doing well today, using tears PRN only  -refracts to 20/25 right eye, 20/30 left eye (this is where the vision was before the cataract surgery)  -chato today shows slightly more astigmastism left eye than last visit despite suture removal  -has central ABMD changes each eye, but surface looks good with no PEE or infiltrates    F/u 2-4 weeks, DFE OD s/p YAG OD      Ly Agustin MD  PGY-5, Cornea Fellow  Ophthalmology    Attending Physician Attestation:  Complete documentation of historical and exam elements from today's encounter can be found in the full encounter summary report (not reduplicated in this progress note).  I personally obtained the chief complaint(s) and history of present illness.  I confirmed and edited as necessary the review of systems, past medical/surgical history, family history, social history, and examination findings as documented by others; and I examined the patient myself.  I personally reviewed the relevant tests, images, and reports as documented above.  I formulated and edited as necessary the assessment and plan and discussed the findings and management plan with the patient and family. I was present for the  key portions of the procedure and immediately available for the remainder. - Tyson Ruby MD

## 2018-12-21 NOTE — PROGRESS NOTES
"Outpatient Physical Therapy Discharge Note     Patient: Nadira Perez  : 1950    Beginning/End Dates of Reporting Period:  2018 to 2018    Referring Provider: Dr. Khushbu oLuise    Therapy Diagnosis: left UE lymphedema     Client Self Report:      Objective Measurements:  Objective Measure: measurements  Details: right UE 1943 mL, left UE 2023 mL, left > right 4%, right UE decreased 0.4%, left UE decreased 8%  Objective Measure: LLIS  Details: LLIS: score 12, 18% impaired. PHYSICAL CONCERNS: pain 0/4, limb heaviness 1/4, skin tightness 1/4, size comparison 1/4, affects movement 2/4, affects strength 1/4. PSYCHOSOCIAL CONCERNS: affects body image 1/4, affects socializing with others 0/4, affects intimate relations 0/4, lymphedema \"gets me down\" (depression, frustration, anger) 1/4, i must rely on others for help due to lymphedema 1/4, i know what to do to manage my lymphedema 0/4. FUNCTIONAL CONCERNS: affects ability to perform self care activities 1/4, affects ability to perform routine home/work related activities 1/4, affects performance of preferred leisure activities 0/4, affects proper fit of clothing 1/4, affects sleep 0/4. INFECTION OCCURRENCE: ill with infection in the past year. 0                Outcome Measures (most recent score):  Lymphedema Life Impact Scale (score range 0-72). A higher score indicates greater impairment.: 12    Goals:  Goal Identifier HOME PROGRAM   Goal Description patient will demonstrate understanding of techniques to independently manage her lymphedema at home. home program to include: gradient compression bandaging, compression garments (donning/doffing, wear schedule, and care of garments), use of velcro night garment, self manual lymphatic drainage, lymphatic exercises, use of compression pump, and skin care.   Target Date 18   Date Met  18   Progress:patient independent     Goal Identifier LLIS   Goal Description LLIS score to show 8+ " improvement: 1 point improvement in limb heaviness and mobility of left arm to improve self care and routine self care activities and home/work related activities and to improve fit of clothing.   Target Date 12/31/18   Date Met      Progress:initial score 12/discharge score 12     Goal Identifier VOLUMETRIC MEASUREMENTS   Goal Description patient will have a 5-10% reduction in swelling to decrease risk of infection and enable the patient to wear normal clothes.    Target Date 12/31/18   Date Met  12/17/18   Progress:left UE decreased 8%       Progress Toward Goals:   Progress this reporting period:  Patient seen for 6 visits with instruction in home program which included: gradient compression bandaging left UE, continued education in compression sleeve and gauntlet, lymphatic exercises, stretching exercises, self manual lymphatic drainage, and skin care. Patient independent with home program.  No further treatment, discharge. Patient instructed to call with questions or concerns.       Plan:  Discharge from therapy.    Discharge:    Reason for Discharge: Patient has met goals #1 and #3, goal #2 ongoing.     Equipment Issued: bandaging supplies, ready made compression sleeve and gauntlet    Discharge Plan: Patient to continue home program.

## 2018-12-21 NOTE — ADDENDUM NOTE
Encounter addended by: Maya Anaya, PT on: 12/21/2018 9:39 AM   Actions taken: Episode resolved, Sign clinical note, Flowsheet accepted

## 2018-12-21 NOTE — ADDENDUM NOTE
Encounter addended by: Maya Anaya PT on: 12/21/2018 9:50 AM   Actions taken: Charge Capture section accepted

## 2018-12-21 NOTE — NURSING NOTE
Patient presents for DFE RE today with possible YAG laser tx. Since the last visit the vision has been stable, about the same. The eyes are dry, using saline PRN. Persistent floaters, maybe 2 flashes over the last 8 wks. No eye drops. Wears readers only PRN. Makeda WRAY 12:46 PM December 21, 2018

## 2018-12-27 ENCOUNTER — THERAPY VISIT (OUTPATIENT)
Dept: PHYSICAL THERAPY | Facility: CLINIC | Age: 68
End: 2018-12-27
Payer: MEDICARE

## 2018-12-27 DIAGNOSIS — G89.29 CHRONIC PAIN OF RIGHT KNEE: ICD-10-CM

## 2018-12-27 DIAGNOSIS — M25.561 CHRONIC PAIN OF RIGHT KNEE: ICD-10-CM

## 2018-12-27 PROCEDURE — 97110 THERAPEUTIC EXERCISES: CPT | Mod: GP | Performed by: PHYSICAL THERAPIST

## 2018-12-27 PROCEDURE — G8979 MOBILITY GOAL STATUS: HCPCS | Mod: GP | Performed by: PHYSICAL THERAPIST

## 2018-12-27 PROCEDURE — G8978 MOBILITY CURRENT STATUS: HCPCS | Mod: GP | Performed by: PHYSICAL THERAPIST

## 2018-12-27 PROCEDURE — 97161 PT EVAL LOW COMPLEX 20 MIN: CPT | Mod: GP | Performed by: PHYSICAL THERAPIST

## 2018-12-27 PROCEDURE — 97530 THERAPEUTIC ACTIVITIES: CPT | Mod: GP | Performed by: PHYSICAL THERAPIST

## 2018-12-27 ASSESSMENT — ACTIVITIES OF DAILY LIVING (ADL)
GO UP STAIRS: ACTIVITY IS FAIRLY DIFFICULT
LIMPING: THE SYMPTOM AFFECTS MY ACTIVITY MODERATELY
AS_A_RESULT_OF_YOUR_KNEE_INJURY,_HOW_WOULD_YOU_RATE_YOUR_CURRENT_LEVEL_OF_DAILY_ACTIVITY?: NEARLY NORMAL
RISE FROM A CHAIR: ACTIVITY IS MINIMALLY DIFFICULT
SWELLING: I HAVE THE SYMPTOM BUT IT DOES NOT AFFECT MY ACTIVITY
SIT WITH YOUR KNEE BENT: ACTIVITY IS MINIMALLY DIFFICULT
KNEEL ON THE FRONT OF YOUR KNEE: I AM UNABLE TO DO THE ACTIVITY
STAND: ACTIVITY IS MINIMALLY DIFFICULT
HOW_WOULD_YOU_RATE_THE_CURRENT_FUNCTION_OF_YOUR_KNEE_DURING_YOUR_USUAL_DAILY_ACTIVITIES_ON_A_SCALE_FROM_0_TO_100_WITH_100_BEING_YOUR_LEVEL_OF_KNEE_FUNCTION_PRIOR_TO_YOUR_INJURY_AND_0_BEING_THE_INABILITY_TO_PERFORM_ANY_OF_YOUR_USUAL_DAILY_ACTIVITIES?: 85
GO DOWN STAIRS: ACTIVITY IS SOMEWHAT DIFFICULT
PAIN: THE SYMPTOM AFFECTS MY ACTIVITY SLIGHTLY
KNEE_ACTIVITY_OF_DAILY_LIVING_SCORE: 55.71
GIVING WAY, BUCKLING OR SHIFTING OF KNEE: THE SYMPTOM AFFECTS MY ACTIVITY SLIGHTLY
WALK: ACTIVITY IS MINIMALLY DIFFICULT
HOW_WOULD_YOU_RATE_THE_OVERALL_FUNCTION_OF_YOUR_KNEE_DURING_YOUR_USUAL_DAILY_ACTIVITIES?: NEARLY NORMAL
RAW_SCORE: 39
SQUAT: ACTIVITY IS VERY DIFFICULT
KNEE_ACTIVITY_OF_DAILY_LIVING_SUM: 39
WEAKNESS: THE SYMPTOM AFFECTS MY ACTIVITY MODERATELY
STIFFNESS: THE SYMPTOM AFFECTS MY ACTIVITY SLIGHTLY

## 2018-12-27 NOTE — LETTER
DEPARTMENT OF HEALTH AND HUMAN SERVICES  CENTERS FOR MEDICARE & MEDICAID SERVICES    PLAN/UPDATED PLAN OF PROGRESS FOR OUTPATIENT REHABILITATION    PATIENTS NAME:  aNdira Perez   : 1950  PROVIDER NUMBER:    [id number]  HICN:  [id number]  PROVIDER NAME: [facility] [place] [facility]  MEDICAL RECORD NUMBER: [id number]   START OF CARE DATE:  18   TYPE:  PT  PRIMARY/TREATMENT DIAGNOSIS: Chronic pain of right knee  VISITS FROM START OF CARE:  Rxs Used: 1     [place] [facility] Initial Evaluation  Subjective:  The history is provided by the patient. No  was used.   Nadira Perez is a 68 year old female with a right knee condition.  Condition occurred with:  A fall/slip.  Condition occurred: in the community.  This is a chronic condition  Pt c/o R knee pain since 2018 after falling on stairs.  MD order date 18.  Had injection that day with limited success.  Per pt imaging showed OA, bursitis and degenerative meniscus.  Pt states she has noted improvement with self exercise (walking and functional use).  Patient reports pain:  Anterior, medial and lateral (medial > lateral).  Pain is described as stabbing, shooting, sharp and aching and is constant and reported as 4/10.   Pain is the same all the time.  Symptoms are exacerbated by ascending stairs, bending/squatting, descending stairs, kneeling, walking, standing and transfers and relieved by bracing/immobilizing and NSAID's (initially but not currently).  Since onset symptoms are gradually improving.  Special tests:  MRI.   General health as reported by patient is good.  Pertinent medical history includes:  Anemia, cancer, heart problems, hepatitis, high blood pressure, history of fractures, menopausal, numbness/tingling, osteoarthritis, osteoporosis, overweight, sleep disorder/apnea and thyroid problems.  Medical allergies: PCN.  Other surgeries include:  Cancer surgery (B  mastectomy 2007).  Current medications:  Anti-inflammatory, high blood pressure medication, meds to increase bone density, muscle relaxants, pain medication, sleep medication and thyroid medication.  Current occupation is RN.  Primary job tasks include:  Prolonged sitting, driving and other (computer).                Objective:  Knee Evaluation:  ROM:  AROM: normal  PROM: normal    Strength:   Extension:  Left:/5  Strong/pain free  Pain:      Right: 5-/5   Pain:  Flexion:  Left:/5  Strong/pain free  Pain:      Right:/5  Strong/pain free  Pain:    Quad Set Left: WNL    Pain:   Quad Set Right:  Fair and delayed    Pain: +/-  Ligament Testing:  Normal  Special Tests:   Right knee positive for the following tests:  Meniscal  Right knee negative for the following special tests:  Patellar Compression  Palpation:  Normal  Functional Testing:    Core Strength:    Single Leg Bridge: Left: /20 reps   Right:/20 reps    % of Uninvolved:  10 dbl legged  Quad:    Bilateral Leg Squat:   Mild loss of control      PATIENTS NAME:  Nadira Perez   : 1950      Assessment/Plan:    Patient is a 68 year old female with right side knee complaints.    Patient has the following significant findings with corresponding treatment plan.                Diagnosis 1:  R knee pain  Pain -  hot/cold therapy, directional preference exercise and home program  Decreased ROM/flexibility - manual therapy and therapeutic exercise  Decreased strength - therapeutic exercise and therapeutic activities  Decreased proprioception - neuro re-education and therapeutic activities  Impaired gait - gait training  Impaired muscle performance - neuro re-education  Decreased function - therapeutic activities    Therapy Evaluation Codes:   1) History comprised of:   Personal factors that impact the plan of care:      None.    Comorbidity factors that impact the plan of care are:      Cancer, Heart problems, High blood pressure, Menopausal, Numbness/tingling,  Osteoarthritis, Overweight and Sleep disorder/apnea.     Medications impacting care: Anti-inflammatory, Bone density, High blood pressure, Muscle relaxant, Pain and Sleep.  2) Examination of Body Systems comprised of:   Body structures and functions that impact the plan of care:      Knee.   Activity limitations that impact the plan of care are:      Squatting/kneeling, Stairs, Standing, Walking and Working.  3) Clinical presentation characteristics are:   Stable/Uncomplicated.  4) Decision-Making    Low complexity using standardized patient assessment instrument and/or measureable assessment of functional outcome.  Cumulative Therapy Evaluation is: Low complexity.    Previous and current functional limitations:  (See Goal Flow Sheet for this information)    Short term and Long term goals: (See Goal Flow Sheet for this information)     Communication ability:  Patient appears to be able to clearly communicate and understand verbal and written communication and follow directions correctly.  Treatment Explanation - The following has been discussed with the patient:   RX ordered/plan of care  Anticipated outcomes  Possible risks and side effects  This patient would benefit from PT intervention to resume normal activities.   Rehab potential is good.    Frequency:  1 X week, once daily  Duration:  for 8 weeks  Discharge Plan:  Achieve all LTG.  Independent in home treatment program.  Reach maximal therapeutic benefit.    Caregiver Signature/Credentials _____________________________ Date ________       Treating Provider:  SHEN Arita    PATIENTS NAME:  Nadira Perez   : 1950          I have reviewed and certified the need for these services and plan of treatment while under my care.        PHYSICIAN'S SIGNATURE:   _________________________________________  Date___________   Willis Borjas DO    Certification period:  Beginning of Cert date period: 18 to  End of Cert period date: 19  "    Functional Level Progress Report: Please see attached \"Goal Flow sheet for Functional level.\"    ____X____ Continue Services or       ________ DC Services                Service dates: From  SOC Date: 12/27/18 date to present                         "

## 2018-12-27 NOTE — PROGRESS NOTES
[facility] Initial Evaluation  Subjective:  The history is provided by the patient. No  was used.   Nadira Perez is a 68 year old female with a right knee condition.  Condition occurred with:  A fall/slip.  Condition occurred: in the community.  This is a chronic condition  Pt c/o R knee pain since 4/2018 after falling on stairs.  MD order date 11/30/18.  Had injection that day with limited success.  Per pt imaging showed OA, bursitis and degenerative meniscus.  Pt states she has noted improvement with self exercise (walking and functional use)..    Patient reports pain:  Anterior, medial and lateral (medial > lateral).    Pain is described as stabbing, shooting, sharp and aching and is constant and reported as 4/10.   Pain is the same all the time.  Symptoms are exacerbated by ascending stairs, bending/squatting, descending stairs, kneeling, walking, standing and transfers and relieved by bracing/immobilizing and NSAID's (initially but not currently).  Since onset symptoms are gradually improving.  Special tests:  MRI.      General health as reported by patient is good.  Pertinent medical history includes:  Anemia, cancer, heart problems, hepatitis, high blood pressure, history of fractures, menopausal, numbness/tingling, osteoarthritis, osteoporosis, overweight, sleep disorder/apnea and thyroid problems.  Medical allergies: PCN.  Other surgeries include:  Cancer surgery (B mastectomy 2007).  Current medications:  Anti-inflammatory, high blood pressure medication, meds to increase bone density, muscle relaxants, pain medication, sleep medication and thyroid medication.  Current occupation is RN.    Primary job tasks include:  Prolonged sitting, driving and other (computer).                                Objective:  System                                                Knee Evaluation:  ROM:  AROM: normal  PROM: normal            Strength:     Extension:  Left:/5   Strong/pain free  Pain:      Right: 5-/5   Pain:  Flexion:  Left:/5  Strong/pain free  Pain:      Right:/5  Strong/pain free  Pain:    Quad Set Left: WNL    Pain:   Quad Set Right:  Fair and delayed    Pain: +/-  Ligament Testing:  Normal                Special Tests:     Right knee positive for the following tests:  Meniscal  Right knee negative for the following special tests:  Patellar Compression  Palpation:  Normal          Functional Testing:        Core Strength:    Single Leg Bridge: Left: /20 reps   Right:/20 reps    % of Uninvolved:  10 dbl legged  Quad:    Single Leg Squat:  Left:      Right:        Bilateral Leg Squat:   Mild loss of control              General     ROS    Assessment/Plan:    Patient is a 68 year old female with right side knee complaints.    Patient has the following significant findings with corresponding treatment plan.                Diagnosis 1:  R knee pain  Pain -  hot/cold therapy, directional preference exercise and home program  Decreased ROM/flexibility - manual therapy and therapeutic exercise  Decreased strength - therapeutic exercise and therapeutic activities  Decreased proprioception - neuro re-education and therapeutic activities  Impaired gait - gait training  Impaired muscle performance - neuro re-education  Decreased function - therapeutic activities    Therapy Evaluation Codes:   1) History comprised of:   Personal factors that impact the plan of care:      None.    Comorbidity factors that impact the plan of care are:      Cancer, Heart problems, High blood pressure, Menopausal, Numbness/tingling, Osteoarthritis, Overweight and Sleep disorder/apnea.     Medications impacting care: Anti-inflammatory, Bone density, High blood pressure, Muscle relaxant, Pain and Sleep.  2) Examination of Body Systems comprised of:   Body structures and functions that impact the plan of care:      Knee.   Activity limitations that impact the plan of care are:      Squatting/kneeling,  Stairs, Standing, Walking and Working.  3) Clinical presentation characteristics are:   Stable/Uncomplicated.  4) Decision-Making    Low complexity using standardized patient assessment instrument and/or measureable assessment of functional outcome.  Cumulative Therapy Evaluation is: Low complexity.    Previous and current functional limitations:  (See Goal Flow Sheet for this information)    Short term and Long term goals: (See Goal Flow Sheet for this information)     Communication ability:  Patient appears to be able to clearly communicate and understand verbal and written communication and follow directions correctly.  Treatment Explanation - The following has been discussed with the patient:   RX ordered/plan of care  Anticipated outcomes  Possible risks and side effects  This patient would benefit from PT intervention to resume normal activities.   Rehab potential is good.    Frequency:  1 X week, once daily  Duration:  for 8 weeks  Discharge Plan:  Achieve all LTG.  Independent in home treatment program.  Reach maximal therapeutic benefit.    Please refer to the daily flowsheet for treatment today, total treatment time and time spent performing 1:1 timed codes.

## 2019-01-07 ENCOUNTER — OFFICE VISIT (OUTPATIENT)
Dept: OPHTHALMOLOGY | Facility: CLINIC | Age: 69
End: 2019-01-07
Attending: OPHTHALMOLOGY
Payer: COMMERCIAL

## 2019-01-07 ENCOUNTER — TELEPHONE (OUTPATIENT)
Dept: AUDIOLOGY | Facility: CLINIC | Age: 69
End: 2019-01-07

## 2019-01-07 DIAGNOSIS — Z96.1 PSEUDOPHAKIA OF BOTH EYES: Primary | ICD-10-CM

## 2019-01-07 DIAGNOSIS — H18.529 ANTERIOR BASEMENT MEMBRANE DYSTROPHY: ICD-10-CM

## 2019-01-07 DIAGNOSIS — H17.9 CORNEAL SCARS, BOTH EYES: ICD-10-CM

## 2019-01-07 DIAGNOSIS — H26.491 POSTERIOR CAPSULE OPACIFICATION, RIGHT: ICD-10-CM

## 2019-01-07 DIAGNOSIS — Z98.890 HISTORY OF RADIAL KERATOTOMY: ICD-10-CM

## 2019-01-07 PROCEDURE — 92015 DETERMINE REFRACTIVE STATE: CPT | Mod: ZF

## 2019-01-07 PROCEDURE — G0463 HOSPITAL OUTPT CLINIC VISIT: HCPCS | Mod: ZF

## 2019-01-07 ASSESSMENT — CUP TO DISC RATIO: OD_RATIO: 0.4

## 2019-01-07 ASSESSMENT — TONOMETRY
OS_IOP_MMHG: 16
OD_IOP_MMHG: 14
IOP_METHOD: ICARE

## 2019-01-07 ASSESSMENT — VISUAL ACUITY
OS_SC+: +1
OD_SC+: -2
METHOD: SNELLEN - LINEAR
OS_SC: 20/30
OD_SC: 20/20

## 2019-01-07 ASSESSMENT — PACHYMETRY
OS_CT(UM): 537
OD_CT(UM): 598

## 2019-01-07 ASSESSMENT — EXTERNAL EXAM - RIGHT EYE: OD_EXAM: NORMAL

## 2019-01-07 ASSESSMENT — REFRACTION_MANIFEST
OS_SPHERE: PLANO
OS_AXIS: 046
OD_CYLINDER: +1.25
OS_CYLINDER: +1.00
OD_AXIS: 161
OS_ADD: +2.50
OD_SPHERE: -0.50
OD_ADD: +2.50

## 2019-01-07 ASSESSMENT — EXTERNAL EXAM - LEFT EYE: OS_EXAM: NORMAL

## 2019-01-07 ASSESSMENT — SLIT LAMP EXAM - LIDS
COMMENTS: NORMAL
COMMENTS: NORMAL

## 2019-01-07 NOTE — NURSING NOTE
Chief Complaints and History of Present Illnesses   Patient presents with     Post Op (Ophthalmology) Right Eye     Chief Complaint(s) and History of Present Illness(es)     Post Op (Ophthalmology) Right Eye     Laterality: right eye              Comments     S/p YAG OD   Pt. States that she is doing well.  No pain BE.  Letitia Nagy COT 1:17 PM January 7, 2019

## 2019-01-07 NOTE — PROGRESS NOTES
CC: s/p CE/IOL OU      HPI: 68 yo CF s/p CE/IOL OS. Surgery complicated by opening of RK wounds temporally. Resutured. Doing well, denies any pain, feels like vision is improving. No flashes, diplopia. She has noticed more floaters OD      Interval:  Doing well since last visit - sutures removed left eye. Used ofloxacin x 4 days, then stopped. No pain or discomfort. Vision still slightly off each eye. Using reading glasses OTC PRN. Post-op YAG capsulotomy OD.    POHx:  H/o RK  S/p CE/IOL OS 2/20/18  S/p CE/IOL OD 3/13/18      Gtts:  PF AT PRN  -did 4 days of ofloxacin left eye after suture removal last visit      Assessment / Plan:      1. S/p CE/IOL both eyes  - s/p YAG RIGHT 1/7/19  - dilated exam normal  - refracted today, new rx dispensed    2. ABMD, each eye   -s/p suture removal left eye last visit  -doing well today, using tears PRN only  -refracts to 20/25 right eye, 20/30 left eye (this is where the vision was before the cataract surgery)  -chato today shows slightly more astigmastism left eye than last visit despite suture removal  -has central ABMD changes each eye, but surface looks good with no PEE or infiltrates    Follow up in 1 year or sooner if needed    Jose Alfredo Hamm MD  PGY-3 Ophthalmology Resident  [phone]    Attending Physician Attestation:  Complete documentation of historical and exam elements from today's encounter can be found in the full encounter summary report (not reduplicated in this progress note).  I personally obtained the chief complaint(s) and history of present illness.  I confirmed and edited as necessary the review of systems, past medical/surgical history, family history, social history, and examination findings as documented by others; and I examined the patient myself.  I personally reviewed the relevant tests, images, and reports as documented above.  I formulated and edited as necessary the assessment and plan and discussed the findings and management plan with the  patient and family. - Tyson Ruby MD

## 2019-01-09 NOTE — TELEPHONE ENCOUNTER
Nadira Perez was seen at the [facility] on 1/7/19 for hearing aid services.  She stated her left hearing aid had stopped working.  Dome (small open) and filter were replaced due to wax accumulation.  Afterwards, the hearing aid returned to normal function.  Nadira reported good sound quality from the hearing aid and will return to the clinic as needed.

## 2019-02-22 ENCOUNTER — THERAPY VISIT (OUTPATIENT)
Dept: PHYSICAL THERAPY | Facility: CLINIC | Age: 69
End: 2019-02-22
Payer: COMMERCIAL

## 2019-02-22 DIAGNOSIS — G89.29 CHRONIC PAIN OF RIGHT KNEE: ICD-10-CM

## 2019-02-22 DIAGNOSIS — M25.561 CHRONIC PAIN OF RIGHT KNEE: ICD-10-CM

## 2019-02-22 PROCEDURE — 97112 NEUROMUSCULAR REEDUCATION: CPT | Mod: GP | Performed by: PHYSICAL THERAPIST

## 2019-02-22 PROCEDURE — 97530 THERAPEUTIC ACTIVITIES: CPT | Mod: GP | Performed by: PHYSICAL THERAPIST

## 2019-02-22 PROCEDURE — G8978 MOBILITY CURRENT STATUS: HCPCS | Mod: GP | Performed by: PHYSICAL THERAPIST

## 2019-02-22 PROCEDURE — G8979 MOBILITY GOAL STATUS: HCPCS | Mod: GP | Performed by: PHYSICAL THERAPIST

## 2019-02-22 PROCEDURE — 97110 THERAPEUTIC EXERCISES: CPT | Mod: GP | Performed by: PHYSICAL THERAPIST

## 2019-02-22 NOTE — PROGRESS NOTES
Subjective:  HPI                    Objective:  System    Physical Exam    General     ROS    Assessment/Plan:    PROGRESS  REPORT    Progress reporting period is from IE to 2/22/19.       SUBJECTIVE  Subjective changes noted by patient:  .  Subjective: Break in PT after hurting back doing pilates.  Overall R knee pain improved, fair compliance with HEP    Current pain level is  Current Pain level: 2/10.     Previous pain level was   Initial Pain level: 4/10.   Changes in function:  Yes (See Goal flowsheet attached for changes in current functional level)  Adverse reaction to treatment or activity: None    OBJECTIVE  Changes noted in objective findings:    Objective: R knee ROM WNL.  Improved but still slight delayed VMO pain free.  SLR no ext lag.  MMT R knee ext 5-/5 +/-.  SLS 10sec mod mm activity.  Dbl legged squat good control pain free     ASSESSMENT/PLAN  Updated problem list and treatment plan: Diagnosis 1:  R knee pain  Pain -  hot/cold therapy and home program  Decreased strength - therapeutic exercise and therapeutic activities  Decreased proprioception - neuro re-education and therapeutic activities  Impaired muscle performance - neuro re-education  Decreased function - therapeutic activities  STG/LTGs have been met or progress has been made towards goals:  Yes (See Goal flow sheet completed today.)  Assessment of Progress: The patient's condition is improving.  Self Management Plans:  Patient has been instructed in a home treatment program.  Patient  has been instructed in self management of symptoms.  I have re-evaluated this patient and find that the nature, scope, duration and intensity of the therapy is appropriate for the medical condition of the patient.  Nadira continues to require the following intervention to meet STG and LTG's:  PT    Recommendations:  This patient would benefit from continued therapy.     Frequency:  2 X a month, once daily  Duration:  for 2 months        Please refer to  the daily flowsheet for treatment today, total treatment time and time spent performing 1:1 timed codes.

## 2019-03-01 ENCOUNTER — OFFICE VISIT (OUTPATIENT)
Dept: PHARMACY | Facility: CLINIC | Age: 69
End: 2019-03-01
Payer: COMMERCIAL

## 2019-03-01 VITALS — DIASTOLIC BLOOD PRESSURE: 81 MMHG | HEART RATE: 71 BPM | SYSTOLIC BLOOD PRESSURE: 142 MMHG

## 2019-03-01 DIAGNOSIS — I10 BENIGN ESSENTIAL HYPERTENSION: Primary | ICD-10-CM

## 2019-03-01 PROCEDURE — 99207 ZZC NO CHARGE LOS: CPT | Performed by: PHARMACIST

## 2019-03-01 RX ORDER — IBUPROFEN 200 MG
600 TABLET ORAL 3 TIMES DAILY
Status: ON HOLD | COMMUNITY
End: 2021-11-22

## 2019-03-01 NOTE — PROGRESS NOTES
SUBJECTIVE/OBJECTIVE:                           Nadira Perez is a 68 year old female coming in for a follow-up for Medication Therapy Management.  She was referred to me from self and was previously seen by Bee Hudson, VilmaD.    Chief Complaint: Hypertension.    Allergies/ADRs: Reviewed in Epic  Tobacco: No tobacco use  Alcohol: Less than 1 beverage / month  Caffeine: 2-5 diet coke daily  Activity: She walks most days of the week and pilates reformer once weekly, she is actively working to lose help  PMH: Reviewed in Epic    Medication Adherence/Access:  no issues reported    Hypertension: Current medications include amlodipine 10 mg daily, lisinopril 40 mg daily, metoprolol XL 25 mg daily and triamterene-hydrochlorothiazide 37.5-25 mg daily.  She is having trouble remembering her evening amlodipine dose. If she forgets the amlodipine, she 160/90s. When she does remember to take amlodipine, they are 130/80s. We discuss ways to improve medication adherence.     Last Comprehensive Metabolic Panel:  Sodium   Date Value Ref Range Status   11/13/2018 138 133 - 144 mmol/L Final     Potassium   Date Value Ref Range Status   11/13/2018 3.1 (L) 3.4 - 5.3 mmol/L Final     Chloride   Date Value Ref Range Status   11/13/2018 103 94 - 109 mmol/L Final     Carbon Dioxide   Date Value Ref Range Status   11/13/2018 28 20 - 32 mmol/L Final     Anion Gap   Date Value Ref Range Status   11/13/2018 7 3 - 14 mmol/L Final     Glucose   Date Value Ref Range Status   11/13/2018 106 (H) 70 - 99 mg/dL Final     Urea Nitrogen   Date Value Ref Range Status   11/13/2018 19 7 - 30 mg/dL Final     Creatinine   Date Value Ref Range Status   11/13/2018 0.73 0.52 - 1.04 mg/dL Final     GFR Estimate   Date Value Ref Range Status   11/13/2018 80 >60 mL/min/1.7m2 Final     Comment:     Non  GFR Calc     Calcium   Date Value Ref Range Status   11/13/2018 9.3 8.5 - 10.1 mg/dL Final     BP Readings from Last 3 Encounters:    03/01/19 142/81   11/13/18 137/85   11/13/18 137/85     Today's Vitals: /81 (BP Location: Right arm, Patient Position: Sitting, Cuff Size: Adult Large)   Pulse 71     It is noted that the patient only scheduled 30 minutes for our visit and we could not cover all of her meds in that time period despite going over our time.  ASSESSMENT:                             Current medications were reviewed today.     Medication Adherence: good, no issues identified    Hypertension: Improved. Patient is not meeting BP goal of < 130/80mmHg. Patient would benefit from improved adherence to amlodipine.     PLAN:                            1) Consider taking amlodipine in the morning to improve adherence.     I spent 30 minutes with this patient today. All changes were made via collaborative practice agreement with Matilde Quiñonez. A copy of the visit note was provided to the patient's primary care provider.    Will follow up in 1 month via XATA.    The patient was sent via XATA a summary of these recommendations as an after visit summary.     Kisha Heller, Pharm.D., BCACP  Medication Therapy Management Pharmacist  Page/VM:  [phone]

## 2019-03-01 NOTE — LETTER
"      HEALTH MEDICATION THERAPY MANAGEMENT     Date: 3/11/2019    Nadira Perez  [address]  [facility] [address]    Dear Ms. Perez,    Thank you for talking with me on 3/1/2019 about your health and medications. Medicare s MTM (Medication Therapy Management) program helps you understand your medications and use them safely.      This letter includes an action plan (Medication Action Plan) and medication list (Personal Medication List). The action plan has steps you should take to help you get the best results from your medications. The medication list will help you keep track of your medications and how to use them the right way.       Have your action plan and medication list with you when you talk with your doctors, pharmacists, and other healthcare providers in your care team.     Ask your doctors, pharmacists, and other healthcare providers to update the action plan and medication list at every visit.     Take your medication list with you if you go to the hospital or emergency room.     Give a copy of the action plan and medication list to your family or caregivers.     If you want to talk about this letter or any of the papers with it, please call   [phone].   We look forward to working with you, your doctors, and other healthcare providers to help you stay healthy.     Sincerely,    Kisha Heller      Enclosed: Medication Action Plan and Personal Medication List    MEDICATION ACTION PLAN FOR Nadira Perez,  1950     This action plan will help you get the best results from your medications if you:   1. Read \"What we talked about.\"   2. Take the steps listed in the \"What I need to do\" boxes.   3. Fill in \"What I did and when I did it.\"   4. Fill in \"My follow-up plan\" and \"Questions I want to ask.\"     Have this action plan with you when you talk with your doctors, pharmacists, and other healthcare providers in your care team. Share this with your family or caregivers " too.  DATE PREPARED: 3/11/2019  What we talked about: Adherence to blood pressure medications                                                  What I need to do: Take amlodipine in the morning to make it easier to remember What I did and when I did it:                                              My follow-up plan:                 Questions I want to ask:              If you have any questions about your action plan, call Kisha Heller, Phone: [phone] , Monday-Friday 8-4:30pm.           MEDICATION LIST FOR Nadira Perez  1950     This medication list was made for you after we talked. We also used information from your doctor's chart.      Use blank rows to add new medications. Then fill in the dates you started using them.    Cross out medications when you no longer use them. Then write the date and why you stopped using them.    Ask your doctors, pharmacists, and other healthcare providers to update this list at every visit. Keep this list up-to-date with:       Prescription medications    Over the counter drugs     Herbals    Vitamins    Minerals      If you go to the hospital or emergency room, take this list with you. Share this with your family or caregivers too.     DATE PREPARED: 3/11/2019  Allergies or side effects: Penicillins     Medication:  AMLODIPINE BESYLATE 10 MG PO TABS      How I use it:  Take 1 tablet (10 mg) by mouth daily      Why I use it: Essential hypertension, benign    Prescriber:  MERCEDES Rivera CNP      Date I started using it:       Date I stopped using it:         Why I stopped using it:            Medication:  ANASTROZOLE 1 MG PO TABS      How I use it:  Take 1 tablet (1 mg) by mouth daily      Why I use it: Malignant neoplasm of left breast in female, estrogen receptor positive, unspecified site of breast (H)    Prescriber:  Sari Ireland PA-C      Date I started using it:       Date I stopped using it:         Why I stopped using it:             Medication:  APAP 500 PO      How I use it:  Take 1,000 mg by mouth 2 times daily      Why I use it:  Pain    Prescriber:  Patient Reported      Date I started using it:       Date I stopped using it:         Why I stopped using it:            Medication:  ATORVASTATIN CALCIUM 40 MG PO TABS      How I use it:  Take 1 tablet (40 mg) by mouth daily       Why I use it:  High cholesterol    Prescriber:  MERCEDES Rivera CNP      Date I started using it:       Date I stopped using it:         Why I stopped using it:            Medication:  COQ10 200 MG PO CAPS      How I use it:  Take 1 capsule by mouth daily      Why I use it:  Muscle cramps    Prescriber:  Patient Reported      Date I started using it:       Date I stopped using it:         Why I stopped using it:            Medication:  GABAPENTIN 300 MG PO CAPS      How I use it:  Take 1-2 capsules by mouth every 8 hours      Why I use it: Neuropathic pain    Prescriber:  MERCEDES Rivera CNP      Date I started using it:       Date I stopped using it:         Why I stopped using it:            Medication:  GLUCOSAMINE-CHONDROITIN 500-400 MG PO CAPS      How I use it:  Take 1 capsule by mouth 2 times daily       Why I use it:  Joint pain    Prescriber:  Patient Reported      Date I started using it:       Date I stopped using it:         Why I stopped using it:            Medication:  HYDROCODONE-ACETAMINOPHEN 5-325 MG PO TABS      How I use it:  Take 1 tablet by mouth every 6 hours as needed      Why I use it: Chronic left-sided low back pain, with sciatica presence unspecified    Prescriber:  MERCEDES Rivera CNP      Date I started using it:       Date I stopped using it:         Why I stopped using it:            Medication:  IBUPROFEN 600 MG PO TABS      How I use it:  Take 600 mg by mouth every 6 hours as needed for moderate pain      Why I use it:  Joint pain    Prescriber:  Patient Reported      Date I started using it:       Date I  stopped using it:         Why I stopped using it:            Medication:  KETOCONAZOLE 2 % EX SHAM      How I use it:  Apply topically daily as needed for itching or irritation      Why I use it: Dermatitis, seborrheic    Prescriber:  Abe Wilkerson MD      Date I started using it:       Date I stopped using it:         Why I stopped using it:            Medication:  LEVOTHYROXINE SODIUM 112 MCG PO TABS      How I use it:  Take 0.5 tab daily      Why I use it: Hypothyroidism    Prescriber:  MERCEDES Rivera CNP      Date I started using it:       Date I stopped using it:         Why I stopped using it:            Medication:  LIDOCAINE HCL 1 % IJ SOLN      How I use it:         Why I use it: Primary osteoarthritis of right knee    Prescriber:  Willis Borjas DO      Date I started using it:       Date I stopped using it:         Why I stopped using it:            Medication:  LISINOPRIL 20 MG PO TABS      How I use it:  Take 2 tablets (40 mg) by mouth daily      Why I use it: Benign essential hypertension    Prescriber:  MERCEDES Rivera CNP      Date I started using it:       Date I stopped using it:         Why I stopped using it:            Medication:  LORAZEPAM 2 MG PO TABS      How I use it:  Take 1 tablet at night for sleep      Why I use it: Sleep disorder    Prescriber:  MERCEDES Rivera CNP      Date I started using it:       Date I stopped using it:         Why I stopped using it:            Medication:  METOPROLOL SUCCINATE ER 25 MG PO TB24      How I use it:  Take 1 tablet (25 mg) by mouth daily      Why I use it: Irregular heart rate    Prescriber:  MERCEDES Rivera CNP      Date I started using it:       Date I stopped using it:         Why I stopped using it:            Medication:  MULTI-VITAMIN PO TABS      How I use it:  Take 1 tablet by mouth daily.      Why I use it:  General health    Prescriber:  Patient Reported      Date I started using it:       Date I  stopped using it:         Why I stopped using it:            Medication:  OMEGA-3 FISH OIL PO      How I use it:  Take 1 capsule by mouth daily       Why I use it:  Cholesterol    Prescriber:  Patient Reported      Date I started using it:       Date I stopped using it:         Why I stopped using it:            Medication:  ONDANSETRON 4 MG PO TBDP      How I use it:  1-2 tabs po prn every 6 hours      Why I use it: Nausea    Prescriber:  MERCEDES Rivera CNP      Date I started using it:       Date I stopped using it:         Why I stopped using it:            Medication:  POTASSIUM CHLORIDE MELODY ER 20 MEQ PO TBCR      How I use it:  Take 1 tablet (20 mEq) by mouth 2 times daily      Why I use it: Hypopotassemia    Prescriber:  MERCEDES Rivera CNP      Date I started using it:       Date I stopped using it:         Why I stopped using it:            Medication:  REPHRESH VA GEL      How I use it:  Place 2 g vaginally every 3 days      Why I use it: Urinary tract infection, site not specified    Prescriber:  MERCEDES Rivera CNP      Date I started using it:       Date I stopped using it:         Why I stopped using it:            Medication:  TERBINAFINE HCL 1 % EX CREA      How I use it:  Apply topically 2 times daily For fungal infection not resolved with other antifungals (e.g. Clotrimazole)      Why I use it: Tinea pedis of both feet    Prescriber:  Abe Wilkerson MD      Date I started using it:       Date I stopped using it:         Why I stopped using it:            Medication:  TRIAMCINOLONE ACETONIDE 0.1 % EX OINT      How I use it:  Apply topically 2 times daily      Why I use it: Eczema, unspecified type    Prescriber:  Abe Wilkerson MD      Date I started using it:       Date I stopped using it:         Why I stopped using it:            Medication:  TRIAMTERENE-HCTZ 37.5-25 MG PO TABS      How I use it:  Take 1 tablet by mouth daily      Why I use it: Essential  hypertension, benign    Prescriber:  MERCEDES Rivera CNP      Date I started using it:       Date I stopped using it:         Why I stopped using it:            Medication:  VITAMIN D3 (CHOLECALCIFEROL) 5000 UNIT PO CAPS      How I use it:  Take 1 capsule (5,000 Units) by mouth daily      Why I use it: Bone health    Prescriber:  MERCEDES Rivera CNP      Date I started using it:       Date I stopped using it:         Why I stopped using it:            Medication:  VOLTAREN 1 % TD GEL      How I use it:  Apply 4 grams to knees or 2 grams to hands four times daily using enclosed dosing card.      Why I use it: Right knee pain, unspecified chronicity    Prescriber:  Live Todd MD      Date I started using it:       Date I stopped using it:         Why I stopped using it:                 Other Information:     If you have any questions about your action plan, call [phone].    According to the Paperwork Reduction Act of 1995, no persons are required to respond to a collection of information unless it displays a valid OMB control number. The valid OMB number for this information collection is [id number]. The time required to complete this information collection is estimated to average 40 minutes per response, including the time to review instructions, searching existing data resources, gather the data needed, and complete and review the information collection. If you have any comments concerning the accuracy of the time estimate(s) or suggestions for improving this form, please write to: CMS, Attn: CARSON Reports Clearance Officer, [address] [place] [phone].

## 2019-03-14 DIAGNOSIS — I10 BENIGN ESSENTIAL HYPERTENSION: ICD-10-CM

## 2019-03-14 RX ORDER — LISINOPRIL 40 MG/1
40 TABLET ORAL DAILY
Qty: 90 TABLET | Refills: 2 | Status: SHIPPED | OUTPATIENT
Start: 2019-03-14 | End: 2019-10-22

## 2019-03-14 RX ORDER — LISINOPRIL 20 MG/1
40 TABLET ORAL DAILY
Qty: 90 TABLET | Refills: 1 | Status: CANCELLED | OUTPATIENT
Start: 2019-03-14

## 2019-03-14 NOTE — TELEPHONE ENCOUNTER
[facility] Call Center    Phone Message    May a detailed message be left on voicemail: yes, Pt is wanting a call when medication has been sent to the pharmacy, Pt is out of medication.     Reason for Call: Medication Refill Request    Has the patient contacted the pharmacy for the refill? Yes   Name of medication being requested: lisinopril (PRINIVIL/ZESTRIL) 40 MG tablet dailiy instead of 20mg   Provider who prescribed the medication: Matilde Quiñonez  Pharmacy: [facility] [id number] [address]  Date medication is needed: 3/14/2019       Action Taken: Message routed to:  [facility] ([facility] andrae

## 2019-03-14 NOTE — TELEPHONE ENCOUNTER
Last Clinic Visit: 10-18-18    Written by Matilde Quiñonez APRN CNP on 11/13/2018  2:08 PM   Ismael,     Your hemoglobin A1C was up to 6.3 from 5.9.  This is a significant jump for you.  We should re-check this in 6 months.     Your potassium was 3.1. I sent an rx for potassium. Your CBC was normal.     Matilde LONG, CNP

## 2019-03-28 ENCOUNTER — THERAPY VISIT (OUTPATIENT)
Dept: PHYSICAL THERAPY | Facility: CLINIC | Age: 69
End: 2019-03-28
Payer: COMMERCIAL

## 2019-03-28 DIAGNOSIS — G89.29 CHRONIC PAIN OF RIGHT KNEE: ICD-10-CM

## 2019-03-28 DIAGNOSIS — M25.561 CHRONIC PAIN OF RIGHT KNEE: ICD-10-CM

## 2019-03-28 PROCEDURE — 97110 THERAPEUTIC EXERCISES: CPT | Mod: GP | Performed by: PHYSICAL THERAPIST

## 2019-03-28 PROCEDURE — 97530 THERAPEUTIC ACTIVITIES: CPT | Mod: GP | Performed by: PHYSICAL THERAPIST

## 2019-03-28 PROCEDURE — 97112 NEUROMUSCULAR REEDUCATION: CPT | Mod: GP | Performed by: PHYSICAL THERAPIST

## 2019-04-19 ENCOUNTER — THERAPY VISIT (OUTPATIENT)
Dept: PHYSICAL THERAPY | Facility: CLINIC | Age: 69
End: 2019-04-19
Payer: COMMERCIAL

## 2019-04-19 DIAGNOSIS — M25.561 CHRONIC PAIN OF RIGHT KNEE: ICD-10-CM

## 2019-04-19 DIAGNOSIS — G89.29 CHRONIC PAIN OF RIGHT KNEE: ICD-10-CM

## 2019-04-19 PROCEDURE — 97112 NEUROMUSCULAR REEDUCATION: CPT | Mod: GP | Performed by: PHYSICAL THERAPIST

## 2019-04-19 PROCEDURE — 97530 THERAPEUTIC ACTIVITIES: CPT | Mod: GP | Performed by: PHYSICAL THERAPIST

## 2019-04-19 PROCEDURE — 97110 THERAPEUTIC EXERCISES: CPT | Mod: GP | Performed by: PHYSICAL THERAPIST

## 2019-05-13 ENCOUNTER — APPOINTMENT (OUTPATIENT)
Dept: LAB | Facility: CLINIC | Age: 69
End: 2019-05-13
Attending: INTERNAL MEDICINE
Payer: COMMERCIAL

## 2019-05-13 ENCOUNTER — INFUSION THERAPY VISIT (OUTPATIENT)
Dept: ONCOLOGY | Facility: CLINIC | Age: 69
End: 2019-05-13
Attending: INTERNAL MEDICINE
Payer: COMMERCIAL

## 2019-05-13 VITALS
HEART RATE: 85 BPM | SYSTOLIC BLOOD PRESSURE: 143 MMHG | WEIGHT: 183.5 LBS | RESPIRATION RATE: 18 BRPM | TEMPERATURE: 98.7 F | DIASTOLIC BLOOD PRESSURE: 83 MMHG | OXYGEN SATURATION: 97 % | BODY MASS INDEX: 28.74 KG/M2

## 2019-05-13 DIAGNOSIS — Z79.811 AROMATASE INHIBITOR USE: ICD-10-CM

## 2019-05-13 DIAGNOSIS — M85.80 OSTEOPENIA, UNSPECIFIED LOCATION: Primary | ICD-10-CM

## 2019-05-13 DIAGNOSIS — Z79.899 OTHER LONG TERM (CURRENT) DRUG THERAPY: ICD-10-CM

## 2019-05-13 DIAGNOSIS — C50.919 MALIGNANT NEOPLASM OF BREAST IN FEMALE, ESTROGEN RECEPTOR POSITIVE, UNSPECIFIED LATERALITY, UNSPECIFIED SITE OF BREAST (H): Primary | ICD-10-CM

## 2019-05-13 DIAGNOSIS — Z17.0 MALIGNANT NEOPLASM OF BREAST IN FEMALE, ESTROGEN RECEPTOR POSITIVE, UNSPECIFIED LATERALITY, UNSPECIFIED SITE OF BREAST (H): Primary | ICD-10-CM

## 2019-05-13 DIAGNOSIS — M81.0 OSTEOPOROSIS, UNSPECIFIED OSTEOPOROSIS TYPE, UNSPECIFIED PATHOLOGICAL FRACTURE PRESENCE: ICD-10-CM

## 2019-05-13 DIAGNOSIS — C50.912 MALIGNANT NEOPLASM OF LEFT BREAST IN FEMALE, ESTROGEN RECEPTOR POSITIVE, UNSPECIFIED SITE OF BREAST (H): ICD-10-CM

## 2019-05-13 DIAGNOSIS — R73.09 ELEVATED HEMOGLOBIN A1C: ICD-10-CM

## 2019-05-13 DIAGNOSIS — Z17.0 MALIGNANT NEOPLASM OF LEFT BREAST IN FEMALE, ESTROGEN RECEPTOR POSITIVE, UNSPECIFIED SITE OF BREAST (H): ICD-10-CM

## 2019-05-13 LAB
ALBUMIN SERPL-MCNC: 3.9 G/DL (ref 3.4–5)
CALCIUM SERPL-MCNC: 9.8 MG/DL (ref 8.5–10.1)
CREAT SERPL-MCNC: 0.7 MG/DL (ref 0.52–1.04)
GFR SERPL CREATININE-BSD FRML MDRD: 89 ML/MIN/{1.73_M2}
HBA1C MFR BLD: 6.1 % (ref 0–5.6)
POTASSIUM SERPL-SCNC: 3.4 MMOL/L (ref 3.4–5.3)

## 2019-05-13 PROCEDURE — 82040 ASSAY OF SERUM ALBUMIN: CPT | Performed by: INTERNAL MEDICINE

## 2019-05-13 PROCEDURE — G0463 HOSPITAL OUTPT CLINIC VISIT: HCPCS | Mod: ZF

## 2019-05-13 PROCEDURE — 99214 OFFICE O/P EST MOD 30 MIN: CPT | Mod: ZP | Performed by: PHYSICIAN ASSISTANT

## 2019-05-13 PROCEDURE — 82565 ASSAY OF CREATININE: CPT | Performed by: INTERNAL MEDICINE

## 2019-05-13 PROCEDURE — 83036 HEMOGLOBIN GLYCOSYLATED A1C: CPT | Performed by: NURSE PRACTITIONER

## 2019-05-13 PROCEDURE — 84132 ASSAY OF SERUM POTASSIUM: CPT | Performed by: PHYSICIAN ASSISTANT

## 2019-05-13 PROCEDURE — 82310 ASSAY OF CALCIUM: CPT | Performed by: INTERNAL MEDICINE

## 2019-05-13 PROCEDURE — 25000128 H RX IP 250 OP 636: Mod: ZF | Performed by: PHYSICIAN ASSISTANT

## 2019-05-13 PROCEDURE — 96365 THER/PROPH/DIAG IV INF INIT: CPT

## 2019-05-13 RX ORDER — ZOLEDRONIC ACID 0.04 MG/ML
4 INJECTION, SOLUTION INTRAVENOUS ONCE
Status: COMPLETED | OUTPATIENT
Start: 2019-05-13 | End: 2019-05-13

## 2019-05-13 RX ORDER — ZOLEDRONIC ACID 0.04 MG/ML
4 INJECTION, SOLUTION INTRAVENOUS ONCE
Status: CANCELLED | OUTPATIENT
Start: 2019-05-13

## 2019-05-13 RX ADMIN — ZOLEDRONIC ACID 4 MG: 0.04 INJECTION, SOLUTION INTRAVENOUS at 13:06

## 2019-05-13 RX ADMIN — SODIUM CHLORIDE 250 ML: 9 INJECTION, SOLUTION INTRAVENOUS at 13:05

## 2019-05-13 ASSESSMENT — PAIN SCALES - GENERAL: PAINLEVEL: MODERATE PAIN (4)

## 2019-05-13 NOTE — NURSING NOTE
"Oncology Rooming Note    May 13, 2019 11:53 AM   Nadira Perez is a 68 year old female who presents for:    Chief Complaint   Patient presents with     Blood Draw     Labs drawn via /PIV placed by RN in lab. VS taken.     Oncology Clinic Visit     Breast Ca , labs, Tx     Initial Vitals: /83 (BP Location: Right arm, Patient Position: Sitting, Cuff Size: Adult Regular)   Pulse 85   Temp 98.7  F (37.1  C) (Oral)   Resp 18   Wt 83.2 kg (183 lb 8 oz)   SpO2 97%   BMI 28.74 kg/m   Estimated body mass index is 28.74 kg/m  as calculated from the following:    Height as of 11/30/18: 1.702 m (5' 7\").    Weight as of this encounter: 83.2 kg (183 lb 8 oz). Body surface area is 1.98 meters squared.  Moderate Pain (4) Comment: Data Unavailable   No LMP recorded. Patient has had a hysterectomy.  Allergies reviewed: Yes  Medications reviewed: Yes    Medications: Medication refills not needed today.  Pharmacy name entered into Wiki-[facility]:    [email or web address]  [facility] [id number] [place] [facility] [id number]  [address]    Clinical concerns: discuss Last Dexa scan  Esdras  was notified.      Quiana Garnett MA              "

## 2019-05-13 NOTE — PROGRESS NOTES
Infusion Nursing Note:  Nadira Perez presents today for Zometa Infusion.    Patient seen and examined by nia XIONG in clinic    Intravenous Access:  Peripheral IV placed in lab    Treatment Conditions:    Lab Results   Component Value Date    POTASSIUM 3.4 05/13/2019           Lab Results   Component Value Date    CR 0.70 05/13/2019                   Lab Results   Component Value Date    RAMBO 9.8 05/13/2019                Lab Results   Component Value Date    ALBUMIN 3.9 05/13/2019                      Results reviewed, labs MET treatment parameters, ok to proceed with treatment.      Post Infusion Assessment:  Patient tolerated infusion without incident.       Discharge Plan:   Patient declined prescription refills.  AVS to patient via [employer].  Patient will return in six months to see Dr Louise in clinic and get zometa.   Face to Face time: 0.    Colleen Caban RN

## 2019-05-13 NOTE — NURSING NOTE
Chief Complaint   Patient presents with     Blood Draw     Labs drawn via /PIV placed by RN in lab. VS taken.     Jenifer Marroquin RN

## 2019-05-13 NOTE — PATIENT INSTRUCTIONS
Contact Numbers    [facility] [place]: [phone]  [facility] Triage and after hours / weekends / holidays:  [phone]      Please call the triage or after hours line if you experience a temperature greater than or equal to 100.5, shaking chills, have uncontrolled nausea, vomiting and/or diarrhea, dizziness, shortness of breath, chest pain, bleeding, unexplained bruising, or if you have any other new/concerning symptoms, questions or concerns.      If you are having any concerning symptoms or wish to speak to a provider before your next infusion visit, please call your care coordinator or triage to notify them so we can adequately serve you.     If you need a refill on a narcotic prescription or other medication, please call before your infusion appointment.               May 2019      Krish Monday Tuesday Wednesday Thursday Friday Saturday                  1     2     3     4       5     6     7     8     9     10     11       12     13    [facility] DRAW  11:00 AM   (15 min.)    [facility] DRAW   [facility] Lab Draw    [facility] RETURN  11:15 AM   (50 min.)   Maggie Dewitt PA-C   [facility] Cancer [facility] ONC INFUSION 60  12:00 PM   (60 min.)    ONCOLOGY INFUSION   [facility] 14     15     16     17    EXTREMITY FOLLOW UP   2:20 PM   (40 min.)   Chaitanya Villasenor, PT   [facility] Physical Therapy 18       19     20    SHORT   9:15 AM   (30 min.)   Kisha Heller RPH   [facility] Medication Therapy Management    US ABDOMEN LIMITED  10:30 AM   (45 min.)   US2   [facility]  21     22     23     24     25       26     27     28     29     30     31 June 2019 Sunday Monday Tuesday Wednesday Thursday Friday Saturday                                 1       2     3    [facility] NEW   1:15 PM   (30 min.)   Dhiraj Encarnacion MD   [facility] General Surgery 4     5     6     7     8       9     10     11      12     13     14     15       16     17     18     19     20     21     22       23     24     25     26     27     28  Happy Birthday!     29       30                                                  Recent Results (from the past 24 hour(s))   Creatinine    Collection Time: 05/13/19 11:35 AM   Result Value Ref Range    Creatinine 0.70 0.52 - 1.04 mg/dL    GFR Estimate 89 >60 mL/min/[1.73_m2]    GFR Estimate If Black >90 >60 mL/min/[1.73_m2]   Calcium    Collection Time: 05/13/19 11:35 AM   Result Value Ref Range    Calcium 9.8 8.5 - 10.1 mg/dL   Albumin level    Collection Time: 05/13/19 11:35 AM   Result Value Ref Range    Albumin 3.9 3.4 - 5.0 g/dL   Hemoglobin A1c    Collection Time: 05/13/19 11:35 AM   Result Value Ref Range    Hemoglobin A1C 6.1 (H) 0 - 5.6 %   Potassium    Collection Time: 05/13/19 11:35 AM   Result Value Ref Range    Potassium 3.4 3.4 - 5.3 mmol/L

## 2019-05-13 NOTE — PROGRESS NOTES
ONCOLOGY VISIT  May 13, 2019    Reason for visit: 6 mos follow up breast cancer    Cancer history: The patient is a 68-year-old woman who in 06/2007 had the onset of left-sided breast discomfort that extended into the axilla. She ultimately did undergo mammogram on 06/13/2007, which identified a mass at the 2:30 position in the left breast. Ultrasound also was notable for some skin and areolar complex thickening. She did undergo biopsy of the mass, and this was consistent with an infiltrating ductal carcinoma that was grade 2. The tumor was ER positive, AL positive, and HER2 negative by FISH. She also had a biopsy of left axillary lymph node versus the tail of the axilla, and this was consistent with metastatic carcinoma. Following the diagnosis the patient did have metastatic staging to include a PET CT scan. This demonstrated increased activity in the left breast as well as the lymph nodes, but was otherwise negative. There was also an MRI of the breast performed, and this demonstrated other lesions of the left breast, but no abnormalities on the right.     She initiated neoadjuvant chemotherapy for her inflammatory breast cancer on 07/13/2007. She received  for 3 cycles through 08/24/2007. This was followed by Taxotere for 3 cycles, which was administered 09/14/2007 through 10/26/2007. Following her chemotherapy she did undergo a left-sided mastectomy with axillary lymph node dissection on 11/20/2007 by Dr. Mauro Mei. The patient did have residual carcinoma with a 1.0 cm tumor as well as associated DCIS. Thirteen of 33 lymph nodes were positive, the largest of which was 0.6 cm of tumor infiltration and included extracapsular extension. The patient also had a right-sided prophylactic mastectomy, which did not demonstrate any noninvasive or invasive carcinoma. Just prior to her surgery Ismael was placed on Arimidex (start date 11/15/2007). Following her surgery she did have some issues with left chest wall  cellulitis as well as some lymphedema of the left upper extremity. She ultimately did go on to receive radiation therapy under the direction of Dr. Nghia Burch. She received radiation to the left chest wall at a dose of 6440 cGy, to the left supraclavicular fossa at a dose of 5040 cGy, and to the left internal mammary chain at a dose of 5080 cGy. Last dose of radiation was administered on 03/07/2008.     She completed 10 years of endocrine therapy with Arimidex and then Tamoxifen in November 2017. In 11/2017, she went back on Arimidex for a prolonged extended therapy as per the MA17 trial.    Interval history:   Ms. Perez is here for 6 month follow-up unaccompanied.     She overall has been doing well. She continues to have some myalgias though they are not impacting her life. She also struggles with vaginal dryness and rePhresh helps though it is expensive. She has been increasing her exercise and diet and has been losing weight which she is happy about. She still have some knee pain and balance issues which she is working with ortho for. She is bothered by the mass in her back and wants to meet with general surgery for it. She has some issues sleeping and has been trying to take hot bathes which help though still does not get as much sleep as she would like. No hot flashes.     ROS: 10 point ROS neg other than the symptoms noted above in the HPI.    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   HEENT: sclera anicteric. Oral mucosa pink and moist   LYMPHATICS: No palpable cervical, supraclavicular, axillary lymphadenopathy bilaterally.   HEART: Regular rate and rhythm. No m/g/r  LUNGS: Clear to auscultation bilaterally.   BREASTS: She is status post bilateral mastectomies without reconstruction. Multiple chest telengectasias, especially mid line and on the left side.  There are no dominant masses on palpation of the chest wall, along the mastectomy scars or into the axilla bilaterally.    ABDOMEN: Soft with normal bowel sounds present. No hepatomegaly.   EXTREMITIES: Warm and well perfused with chronic left upper extremity lymphedema. No lower extremity edema.  Back: Small, mobile soft mass on right lower back near previous scar    Assessment/plan:  1. Inflammatory breast cancer, stage IIIC, the left breast, ER positive, HER2 negative, -status post neoadjuvant chemotherapy followed by bilateral mastectomies. Chest wall radiotherapy completed in 03/2008. She was on adjuvant Arimidex from November 2007 until November 2012, and then was switched to tamoxifen to complete 10 years of thearpy in November 2017. At that time, she enrolled in MA 17 trial using extended Arimidex.   -She remains on Arimidex. Myalgias continue to be tolerable. Has some insomnia which also could be related.   -no clinic signs of reoccurrence.   -will RTC in 6 months with labs, Blaes and Zometa    2. Bone health: History of osteoporosis. Most recent DEXA 11/2018 with T score -1.4. Recommended to repeat in 2 years (due 2020)  -continues Zometa every 6 months, due today. Next due November 2019.    3. RHM: Managed by Matilde Quiñonez CNP. HgbA1c was 6.1 today. Continue to follow good diet as well as  moderate intensity exercise for 150 minutes a week. Has had intention weight loss. BP stable today.    4. Vaginal dryness. Continues RePhresh.     5. Likely Lipoma  -has soft mobile mass on right lower back, where a lipoma was previously removed.  -will have her get an U/S and then follow-up with general surgery to discuss removal    Maggie Dewitt PA-C  [facility]  [address] [place] [id number] [phone]

## 2019-05-13 NOTE — LETTER
5/13/2019      RE: Nadira Perez  [address]  [facility] [address]       ONCOLOGY VISIT  May 13, 2019    Reason for visit: 6 mos follow up breast cancer    Cancer history: The patient is a 68-year-old woman who in 06/2007 had the onset of left-sided breast discomfort that extended into the axilla. She ultimately did undergo mammogram on 06/13/2007, which identified a mass at the 2:30 position in the left breast. Ultrasound also was notable for some skin and areolar complex thickening. She did undergo biopsy of the mass, and this was consistent with an infiltrating ductal carcinoma that was grade 2. The tumor was ER positive, IL positive, and HER2 negative by FISH. She also had a biopsy of left axillary lymph node versus the tail of the axilla, and this was consistent with metastatic carcinoma. Following the diagnosis the patient did have metastatic staging to include a PET CT scan. This demonstrated increased activity in the left breast as well as the lymph nodes, but was otherwise negative. There was also an MRI of the breast performed, and this demonstrated other lesions of the left breast, but no abnormalities on the right.     She initiated neoadjuvant chemotherapy for her inflammatory breast cancer on 07/13/2007. She received  for 3 cycles through 08/24/2007. This was followed by Taxotere for 3 cycles, which was administered 09/14/2007 through 10/26/2007. Following her chemotherapy she did undergo a left-sided mastectomy with axillary lymph node dissection on 11/20/2007 by Dr. Mauro Mei. The patient did have residual carcinoma with a 1.0 cm tumor as well as associated DCIS. Thirteen of 33 lymph nodes were positive, the largest of which was 0.6 cm of tumor infiltration and included extracapsular extension. The patient also had a right-sided prophylactic mastectomy, which did not demonstrate any noninvasive or invasive carcinoma. Just prior to her surgery Ismael was placed on Arimidex (start  date 11/15/2007). Following her surgery she did have some issues with left chest wall cellulitis as well as some lymphedema of the left upper extremity. She ultimately did go on to receive radiation therapy under the direction of Dr. Nghia Burch. She received radiation to the left chest wall at a dose of 6440 cGy, to the left supraclavicular fossa at a dose of 5040 cGy, and to the left internal mammary chain at a dose of 5080 cGy. Last dose of radiation was administered on 03/07/2008.     She completed 10 years of endocrine therapy with Arimidex and then Tamoxifen in November 2017. In 11/2017, she went back on Arimidex for a prolonged extended therapy as per the MA17 trial.    Interval history:   Ms. Perez is here for 6 month follow-up unaccompanied.     She overall has been doing well. She continues to have some myalgias though they are not impacting her life. She also struggles with vaginal dryness and rePhresh helps though it is expensive. She has been increasing her exercise and diet and has been losing weight which she is happy about. She still have some knee pain and balance issues which she is working with ortho for. She is bothered by the mass in her back and wants to meet with general surgery for it. She has some issues sleeping and has been trying to take hot bathes which help though still does not get as much sleep as she would like. No hot flashes.     ROS: 10 point ROS neg other than the symptoms noted above in the HPI.    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   HEENT: sclera anicteric. Oral mucosa pink and moist   LYMPHATICS: No palpable cervical, supraclavicular, axillary lymphadenopathy bilaterally.   HEART: Regular rate and rhythm. No m/g/r  LUNGS: Clear to auscultation bilaterally.   BREASTS: She is status post bilateral mastectomies without reconstruction. Multiple chest telengectasias, especially mid line and on the left side.  There are no dominant masses on palpation of the chest wall, along the mastectomy scars or into the axilla bilaterally.    ABDOMEN: Soft with normal bowel sounds present. No hepatomegaly.   EXTREMITIES: Warm and well perfused with chronic left upper extremity lymphedema. No lower extremity edema.  Back: Small, mobile soft mass on right lower back near previous scar    Assessment/plan:  1. Inflammatory breast cancer, stage IIIC, the left breast, ER positive, HER2 negative, -status post neoadjuvant chemotherapy followed by bilateral mastectomies. Chest wall radiotherapy completed in 03/2008. She was on adjuvant Arimidex from November 2007 until November 2012, and then was switched to tamoxifen to complete 10 years of thearpy in November 2017. At that time, she enrolled in MA 17 trial using extended Arimidex.   -She remains on Arimidex. Myalgias continue to be tolerable. Has some insomnia which also could be related.   -no clinic signs of reoccurrence.   -will RTC in 6 months with labs, Blaes and Zometa    2. Bone health: History of osteoporosis. Most recent DEXA 11/2018 with T score -1.4. Recommended to repeat in 2 years (due 2020)  -continues Zometa every 6 months, due today. Next due November 2019.    3. RHM: Managed by  Matilde Quiñonez CNP. HgbA1c was 6.1 today. Continue to follow good diet as well as moderate intensity exercise for 150 minutes a week. Has had intention weight loss. BP stable today.    4. Vaginal dryness. Continues RePhresh.     5. Likely Lipoma  -has soft mobile mass on right lower back, where a lipoma was previously removed.  -will have her get an U/S and then follow-up with general surgery to discuss removal    Maggie Dewitt PA-C  [facility]  [address] [place] [id number] [phone]

## 2019-05-17 ENCOUNTER — THERAPY VISIT (OUTPATIENT)
Dept: PHYSICAL THERAPY | Facility: CLINIC | Age: 69
End: 2019-05-17
Payer: COMMERCIAL

## 2019-05-17 DIAGNOSIS — M25.561 CHRONIC PAIN OF RIGHT KNEE: ICD-10-CM

## 2019-05-17 DIAGNOSIS — G89.29 CHRONIC PAIN OF RIGHT KNEE: ICD-10-CM

## 2019-05-17 PROCEDURE — 97110 THERAPEUTIC EXERCISES: CPT | Mod: GP | Performed by: PHYSICAL THERAPIST

## 2019-05-17 PROCEDURE — 97530 THERAPEUTIC ACTIVITIES: CPT | Mod: GP | Performed by: PHYSICAL THERAPIST

## 2019-05-17 ASSESSMENT — ACTIVITIES OF DAILY LIVING (ADL)
SWELLING: I HAVE THE SYMPTOM BUT IT DOES NOT AFFECT MY ACTIVITY
GIVING WAY, BUCKLING OR SHIFTING OF KNEE: I HAVE THE SYMPTOM BUT IT DOES NOT AFFECT MY ACTIVITY
STAND: ACTIVITY IS MINIMALLY DIFFICULT
RISE FROM A CHAIR: ACTIVITY IS MINIMALLY DIFFICULT
WALK: ACTIVITY IS MINIMALLY DIFFICULT
KNEE_ACTIVITY_OF_DAILY_LIVING_SUM: 53
SIT WITH YOUR KNEE BENT: ACTIVITY IS MINIMALLY DIFFICULT
STIFFNESS: I HAVE THE SYMPTOM BUT IT DOES NOT AFFECT MY ACTIVITY
HOW_WOULD_YOU_RATE_THE_CURRENT_FUNCTION_OF_YOUR_KNEE_DURING_YOUR_USUAL_DAILY_ACTIVITIES_ON_A_SCALE_FROM_0_TO_100_WITH_100_BEING_YOUR_LEVEL_OF_KNEE_FUNCTION_PRIOR_TO_YOUR_INJURY_AND_0_BEING_THE_INABILITY_TO_PERFORM_ANY_OF_YOUR_USUAL_DAILY_ACTIVITIES?: 90
SQUAT: ACTIVITY IS SOMEWHAT DIFFICULT
RAW_SCORE: 53
LIMPING: I HAVE THE SYMPTOM BUT IT DOES NOT AFFECT MY ACTIVITY
KNEEL ON THE FRONT OF YOUR KNEE: ACTIVITY IS FAIRLY DIFFICULT
HOW_WOULD_YOU_RATE_THE_OVERALL_FUNCTION_OF_YOUR_KNEE_DURING_YOUR_USUAL_DAILY_ACTIVITIES?: NEARLY NORMAL
AS_A_RESULT_OF_YOUR_KNEE_INJURY,_HOW_WOULD_YOU_RATE_YOUR_CURRENT_LEVEL_OF_DAILY_ACTIVITY?: NEARLY NORMAL
GO UP STAIRS: ACTIVITY IS MINIMALLY DIFFICULT
GO DOWN STAIRS: ACTIVITY IS MINIMALLY DIFFICULT
KNEE_ACTIVITY_OF_DAILY_LIVING_SCORE: 75.71
PAIN: I HAVE THE SYMPTOM BUT IT DOES NOT AFFECT MY ACTIVITY
WEAKNESS: I HAVE THE SYMPTOM BUT IT DOES NOT AFFECT MY ACTIVITY

## 2019-05-17 NOTE — PROGRESS NOTES
"Subjective:  HPI       Knee Activity of Daily Living Score: 75.71            Objective:  System    Physical Exam    General     ROS    Assessment/Plan:    DISCHARGE REPORT    Progress reporting period is from IE to 5/17/19.       SUBJECTIVE  Subjective changes noted by patient:  .  Subjective: Doing well.  Stronger and less pain.  Doing pilates with mm soreness vs pain.  Balance greatest issue, but to start at balance center in next few weeks    Current pain level is  Current Pain level: (0-1/10).     Previous pain level was   Initial Pain level: 4/10.   Changes in function:  Yes (See Goal flowsheet attached for changes in current functional level)  Adverse reaction to treatment or activity: None    OBJECTIVE  Changes noted in objective findings:    Objective: ROM WNL (-).  MMT: R knee 5/5 -.  Good QS, good control 4\" step down     ASSESSMENT/PLAN  Updated problem list and treatment plan: Diagnosis 1:  R knee pain  Pain -  home program  Decreased strength - home program  Decreased proprioception - home program  Impaired muscle performance - home program  Decreased function - home program  STG/LTGs have been met or progress has been made towards goals:  Yes (See Goal flow sheet completed today.)  Assessment of Progress: The patient's condition is improving.  Self Management Plans:  Patient is independent in a home treatment program.  Patient is independent in self management of symptoms.  I have re-evaluated this patient and find that the nature, scope, duration and intensity of the therapy is appropriate for the medical condition of the patient.  Nadira continues to require the following intervention to meet STG and LTG's:  PT intervention is no longer required to meet STG/LTG.    Recommendations:  This patient is ready to be discharged from therapy and continue their home treatment program.    Please refer to the daily flowsheet for treatment today, total treatment time and time spent performing 1:1 timed " codes.

## 2019-05-17 NOTE — LETTER
"[facility]  [address]  [facility] [address]  [phone]    May 20, 2019    Re: Nadira Perez   :   1950  MRN:  [id number]   REFERRING PHYSICIAN:   Willis Borjas    [facility]  Date of Initial Evaluation:  18  Visits:  Rxs Used: 5  Reason for Referral:  Chronic pain of right knee    DISCHARGE REPORT  Progress reporting period is from 18 to 19.       SUBJECTIVE  Subjective changes noted by patient:  .  Subjective: Doing well.  Stronger and less pain.  Doing pilates with mm soreness vs pain.  Balance greatest issue, but to start at balance center in next few weeks    Current pain level is  Current Pain level: (0-1/10).     Previous pain level was   Initial Pain level: 4/10.   Changes in function:  Yes (See Goal flowsheet attached for changes in current functional level)  Adverse reaction to treatment or activity: None    OBJECTIVE  Changes noted in objective findings:    Objective: ROM WNL (-).  MMT: R knee 5/5 -.  Good QS, good control 4\" step down     ASSESSMENT/PLAN  Updated problem list and treatment plan: Diagnosis 1:  R knee pain  Pain -  home program  Decreased strength - home program  Decreased proprioception - home program  Impaired muscle performance - home program  Decreased function - home program  STG/LTGs have been met or progress has been made towards goals:  Yes (See Goal flow sheet completed today.)  Assessment of Progress: The patient's condition is improving.  Self Management Plans:  Patient is independent in a home treatment program.  Patient is independent in self management of symptoms.  I have re-evaluated this patient and find that the nature, scope, duration and intensity of the therapy is appropriate for the medical condition of the patient.  Nadira continues to require the following intervention to meet STG and LTG's:  PT intervention " is no longer required to meet STG/LTG.        Re: Nadira Perez   :   1950    Recommendations:  This patient is ready to be discharged from therapy and continue their home treatment program.    Thank you for your referral.    INQUIRIES  Therapist: Chaitanya Villasenor   [facility]  [address] [address]  Phone: [phone]  Fax: [phone]

## 2019-05-20 ENCOUNTER — OFFICE VISIT (OUTPATIENT)
Dept: PHARMACY | Facility: CLINIC | Age: 69
End: 2019-05-20
Payer: COMMERCIAL

## 2019-05-20 ENCOUNTER — ANCILLARY PROCEDURE (OUTPATIENT)
Dept: ULTRASOUND IMAGING | Facility: CLINIC | Age: 69
End: 2019-05-20
Attending: INTERNAL MEDICINE
Payer: COMMERCIAL

## 2019-05-20 DIAGNOSIS — D17.30 LIPOMA OF SKIN AND SUBCUTANEOUS TISSUE: ICD-10-CM

## 2019-05-20 DIAGNOSIS — I10 BENIGN ESSENTIAL HYPERTENSION: Primary | ICD-10-CM

## 2019-05-20 PROCEDURE — 99207 ZZC NO CHARGE LOS: CPT | Performed by: PHARMACIST

## 2019-05-20 NOTE — PROGRESS NOTES
"SUBJECTIVE/OBJECTIVE:                           Nadira Perez is a 68 year old female coming in for a follow-up for Medication Therapy Management.  She was referred to me from self and was previously seen by Bee Hudson, Alina.  Patient is about 10 minutes late for our visit today.  She states that the check in line was very long, making her late.     Chief Complaint: Follow-up on hypertension    Allergies/ADRs: Reviewed in Epic  Tobacco: No tobacco use  Alcohol: Less than 1 beverage / month  Caffeine: 2-5 diet coke daily  Activity: She walks most days of the week and pilates reformer once weekly, she is actively working to lose weight  PMH: Reviewed in Epic    Medication Adherence/Access:  no issues reported    Hypertension: Current medications include amlodipine 10 mg daily, lisinopril 40 mg daily, metoprolol XL 25 mg daily and triamterene-hydrochlorothiazide 37.5-25 mg daily.  When we last visited, she was having trouble remembering her evening amlodipine dose. I asked she take the entire dose in the morning to improve her adherence. She has not done this as she is concerned about \"coverage\" for her blood pressure throughout the day. Pharmacokinetics of amlodipine are discussed today.      Home range reported 126/83 - 154/102 mmHg.  The lower numbers are when she takes the amlodipine.  She continues to forget her evening amlodipine.  She also reports losing about 30 lbs since we last talked through diet and exercise improvements. She is heartily congratulated on this achievement.     Last Comprehensive Metabolic Panel:  Sodium   Date Value Ref Range Status   11/13/2018 138 133 - 144 mmol/L Final     Potassium   Date Value Ref Range Status   05/13/2019 3.4 3.4 - 5.3 mmol/L Final     Chloride   Date Value Ref Range Status   11/13/2018 103 94 - 109 mmol/L Final     Carbon Dioxide   Date Value Ref Range Status   11/13/2018 28 20 - 32 mmol/L Final     Anion Gap   Date Value Ref Range Status   11/13/2018 7 3 - 14 " mmol/L Final     Glucose   Date Value Ref Range Status   11/13/2018 106 (H) 70 - 99 mg/dL Final     Urea Nitrogen   Date Value Ref Range Status   11/13/2018 19 7 - 30 mg/dL Final     Creatinine   Date Value Ref Range Status   05/13/2019 0.70 0.52 - 1.04 mg/dL Final     GFR Estimate   Date Value Ref Range Status   05/13/2019 89 >60 mL/min/[1.73_m2] Final     Comment:     Non  GFR Calc  Starting 12/18/2018, serum creatinine based estimated GFR (eGFR) will be   calculated using the Chronic Kidney Disease Epidemiology Collaboration   (CKD-EPI) equation.       Calcium   Date Value Ref Range Status   05/13/2019 9.8 8.5 - 10.1 mg/dL Final     BP Readings from Last 3 Encounters:   05/13/19 143/83   03/01/19 142/81   11/13/18 137/85     Today's Vitals: There were no vitals taken for this visit. - pt declines in favor of home measurements    ASSESSMENT:                             Current medications were reviewed today.     Medication Adherence: good, no issues identified    Hypertension: Improved. Patient is not meeting BP goal of < 130/80mmHg. Patient would benefit from improved adherence to amlodipine by taking her second dose of the day closer to when she takes her other afternoon pills.     PLAN:                            1) Take amlodipine in the morning and around 6 PM to improve adherence.       I spent 20 minutes with this patient today. All changes were made via collaborative practice agreement with Matilde Quiñonez. A copy of the visit note was provided to the patient's primary care provider.    Will follow up in 1 month via [employer] ([employer]) to check on blood pressures.     The patient was sent via [employer] ([employer]) a summary of these recommendations as an after visit summary.     Kisha Heller, Pharm.D., BCACP  Medication Therapy Management Pharmacist  Page/VM:  [phone]

## 2019-05-23 ENCOUNTER — DOCUMENTATION ONLY (OUTPATIENT)
Dept: CARE COORDINATION | Facility: CLINIC | Age: 69
End: 2019-05-23

## 2019-05-24 DIAGNOSIS — D17.30 LIPOMA OF SKIN AND SUBCUTANEOUS TISSUE: Primary | ICD-10-CM

## 2019-05-31 ENCOUNTER — TELEPHONE (OUTPATIENT)
Dept: SURGERY | Facility: CLINIC | Age: 69
End: 2019-05-31

## 2019-05-31 NOTE — TELEPHONE ENCOUNTER
Pre Visit Call and Assessment    Date of call:  05/31/2019    Phone numbers:  Home number on file [phone] (home)    Reached patient/confirmed appointment:  No - left message:   on voicemail  Patient care team/Primary provider:  Matilde Quiñonez  Preferred outpatient pharmacy:    [facility]  [address] [facility] [address]  Phone: [phone] Fax: [phone] Alternate Fax: [phone]    [facility] [address] [facility] [employer] - [place], [place] - [address] [facility]  [address] [facility] [address]  Phone: [phone] Fax: [phone]    Referred to:  Dr. Richard Encarnacion    Reason for visit:  . 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     Corneal opacity     Hypothyroidism     Osteopenia, unspecified location     Aromatase inhibitor use     Chronic pain of right knee       Allergies:     Allergies   Allergen Reactions     Penicillins Hives       History:      Past Medical History:   Diagnosis Date     Arthritis      Bone disease      Breast cancer (H)      H/O kyphoplasty      Hearing problem      History of kidney stones      History of radiation therapy      Hyperlipemia      Hypertension      Hypopotassemia      Kidney problem      Lymph edema      Medullary sponge kidney      Osteopenia      PONV (postoperative nausea and vomiting)      Reduced vision      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     Thyroid disease        Past  Surgical History:   Procedure Laterality Date     ABDOMEN SURGERY      ovarian cyst, mesh     ARTHRODESIS WRIST       ARTHRODESIS WRIST  2/14/2013    Procedure: ARTHRODESIS WRIST;  left wrist scaphoid excision, four bone fusion, iliac crest bone graft  ( Mac with block);  Surgeon: Av Mendez MD;  [facility]: [facility]     BIOPSY      skin, vaginal     BIOPSY OF SKIN LESION       CATARACT IOL, RT/LT Right 03/13/2018     CATARACT IOL, RT/LT Left 02/20/2018     COLONOSCOPY  12/24/2013    Procedure: COMBINED COLONOSCOPY, SINGLE BIOPSY/POLYPECTOMY BY BIOPSY;  COLONOSCOPY;  Surgeon: Dom Alvarez MD;  Location:  GI     COSMETIC SURGERY       ESOPHAGOSCOPY, GASTROSCOPY, DUODENOSCOPY (EGD), COMBINED N/A 11/23/2016    Procedure: COMBINED ESOPHAGOSCOPY, GASTROSCOPY, DUODENOSCOPY (EGD);  Surgeon: Quinten Feliciano MD;  Location:  GI     EXTERNAL EAR SURGERY      right     EYE SURGERY      radial keratomy     GRAFT BONE FROM ILIAC CREST  2/14/2013    Procedure: GRAFT BONE FROM ILIAC CREST;  mac with block and local infilitration;  Surgeon: Av Mendez MD;  [facility]: [facility]     HC BREATH HYDROGEN TEST N/A 10/14/2016    Procedure: HYDROGEN BREATH TEST;  Surgeon: Cheri Barron MD;  Location:  GI     HERNIA REPAIR      UMBILICAL     HERNIA REPAIR      umbilical age 18 mos.     HERNIA REPAIR       HERNIORRHAPHY UMBILICAL  1/31/1954     HYSTERECTOMY TOTAL ABDOMINAL  5/3/00     HYSTERECTOMY TOTAL ABDOMINAL  5/3/2000     MASTECTOMY      PROPHYLACTIC RIGHT BREAST     MASTECTOMY MODIFIED RADICAL      LEFT BREAST     MASTECTOMY MODIFIED RADICAL      bilateral; right breast prophylactic     PARATHYROIDECTOMY  9/23/04    R SUPERIOR     PARATHYROIDECTOMY  9/23/2004    parathyroid resection, subtotal     REMOVE HARDWARE HAND  9/24/2013    Procedure: REMOVE HARDWARE HAND;  Left Hand Screw Removal        RHINOPLASTY  12/31/1985     RHINOPLASTY  12/31/1985     thyr proc skin closed cosmetic manner by  subcuticular stitch  1/23/2009     TONSILLECTOMY  3/1/68     TONSILLECTOMY  3/1/1968     WRIST SURGERY      wrist arthrodesis       Family History   Problem Relation Age of Onset     Heart Disease Father         AAA     Hypertension Father      Neurologic Disorder Mother         Anuerysm of Cerebral Artery, Dementia     Diabetes Mother      Thyroid Disease Mother         ,     Cerebrovascular Disease Mother      Dementia Mother      Osteoporosis Mother      Diabetes Maternal Grandmother      Asthma Maternal Grandmother      Diabetes Maternal Aunt         x2     Melanoma Maternal Aunt      Glaucoma Maternal Aunt      Circulatory Brother         Perihperal Neurophathy     Dementia Other      Cancer Other         malignant melanoma     Hypertension Other      Hypertension Other      Cerebrovascular Disease Other      Cerebrovascular Disease Other      Obesity Other      Respiratory Other      Chronic Obstructive Pulmonary Disease Maternal Grandfather         father     Asthma Maternal Grandfather      Breast Cancer Cousin      Cancer Other      Diabetes Other      Asthma Other      Macular Degeneration No family hx of      Coronary Artery Disease No family hx of      Hyperlipidemia No family hx of      Kidney Disease No family hx of      Thrombosis No family hx of      Arthritis No family hx of      Depression No family hx of      Mental Illness No family hx of      Substance Abuse No family hx of      Cystic Fibrosis No family hx of      Early Death No family hx of      Coronary Artery Disease Early Onset No family hx of      Heart Failure No family hx of      Bleeding Diathesis No family hx of      Ovarian Cancer No family hx of      Uterine Cancer No family hx of      Prostate Cancer No family hx of      Colorectal Cancer No family hx of      Pancreatic Cancer No family hx of      Lung Cancer No family hx of      Other Cancer No family hx of      Autoimmune Disease No family hx of      Unknown/Adopted No family hx  of      Genetic Disorder No family hx of        Social History     Tobacco Use     Smoking status: Never Smoker     Smokeless tobacco: Never Used   Substance Use Topics     Alcohol use: Yes     Comment: Rare to occasional

## 2019-06-06 ENCOUNTER — TELEPHONE (OUTPATIENT)
Dept: AUDIOLOGY | Facility: CLINIC | Age: 69
End: 2019-06-06

## 2019-06-06 NOTE — TELEPHONE ENCOUNTER
Ismael Perez was seen at the [facility] on 6/6/19 for hearing aid services.  She reported her left hearing aid wasn't working.  Examination revealed a non-functioning hearing aid that did not improve with  replacement.  The hearing aid is being sent the  for warranty repair.  Ismael will be contacted to  the repaired device and pair it in the software with her right hearing aid.

## 2019-06-21 ENCOUNTER — TELEPHONE (OUTPATIENT)
Dept: SURGERY | Facility: CLINIC | Age: 69
End: 2019-06-21

## 2019-06-21 NOTE — TELEPHONE ENCOUNTER
Pre Visit Call and Assessment    Date of call:  06/21/2019    Phone numbers:  Cell number on file:    Telephone Information:   Mobile [phone]       Reached patient/confirmed appointment:  Yes  Patient care team/Primary provider:  Matilde Quiñonez  Preferred outpatient pharmacy:    [facility]  [address] [address]  Phone: [phone] Fax: [phone] Alternate Fax: [phone]    [facility] [address] [facility] [employer] - [place], [place] - [address] [facility]  [address] [facility] [address]  Phone: [phone] Fax: [phone]    Referred to:  Dr. Richard Encarnacion    Reason for visit:  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     Corneal opacity     Hypothyroidism     Osteopenia, unspecified location     Aromatase inhibitor use     Chronic pain of right knee       Allergies:     Allergies   Allergen Reactions     Penicillins Hives       History:      Past Medical History:   Diagnosis Date     Arthritis      Bone disease      Breast cancer (H)      H/O kyphoplasty      Hearing problem      History of kidney stones      History of radiation therapy      Hyperlipemia      Hypertension      Hypopotassemia      Kidney problem      Lymph edema      Medullary sponge kidney      Osteopenia      PONV (postoperative nausea and vomiting)      Reduced vision      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     Thyroid disease        Past  Surgical History:   Procedure Laterality Date     ABDOMEN SURGERY      ovarian cyst, mesh     ARTHRODESIS WRIST       ARTHRODESIS WRIST  2/14/2013    Procedure: ARTHRODESIS WRIST;  left wrist scaphoid excision, four bone fusion, iliac crest bone graft  ( Mac with block);  Surgeon: Av Mendez MD;  [facility]     BIOPSY      skin, vaginal     BIOPSY OF SKIN LESION       CATARACT IOL, RT/LT Right 03/13/2018     CATARACT IOL, RT/LT Left 02/20/2018     COLONOSCOPY  12/24/2013    Procedure: COMBINED COLONOSCOPY, SINGLE BIOPSY/POLYPECTOMY BY BIOPSY;  COLONOSCOPY;  Surgeon: Dom Alvarez MD;  Location:  GI     COSMETIC SURGERY       ESOPHAGOSCOPY, GASTROSCOPY, DUODENOSCOPY (EGD), COMBINED N/A 11/23/2016    Procedure: COMBINED ESOPHAGOSCOPY, GASTROSCOPY, DUODENOSCOPY (EGD);  Surgeon: Quinten Feliciano MD;  Location:  GI     EXTERNAL EAR SURGERY      right     EYE SURGERY      radial keratomy     GRAFT BONE FROM ILIAC CREST  2/14/2013    Procedure: GRAFT BONE FROM ILIAC CREST;  mac with block and local infilitration;  Surgeon: Av Mendez MD;  [facility]     HC BREATH HYDROGEN TEST N/A 10/14/2016    Procedure: HYDROGEN BREATH TEST;  Surgeon: Cheri Barron MD;  Location:  GI     HERNIA REPAIR      UMBILICAL     HERNIA REPAIR      umbilical age 18 mos.     HERNIA REPAIR       HERNIORRHAPHY UMBILICAL  1[birth year]31/1954     HYSTERECTOMY TOTAL ABDOMINAL  5/3/00     HYSTERECTOMY TOTAL ABDOMINAL  5/3/2000     MASTECTOMY      PROPHYLACTIC RIGHT BREAST     MASTECTOMY MODIFIED RADICAL      LEFT BREAST     MASTECTOMY MODIFIED RADICAL      bilateral; right breast prophylactic     PARATHYROIDECTOMY  9/23/04    R SUPERIOR     PARATHYROIDECTOMY  9/23/2004    parathyroid resection, subtotal     REMOVE HARDWARE HAND  9/24/2013    Procedure: REMOVE HARDWARE HAND;  Left Hand Screw Removal        RHINOPLASTY  12/31/1985     RHINOPLASTY  12/31/1985     thyr proc skin closed cosmetic manner by  subcuticular stitch  1/23/2009     TONSILLECTOMY  3/1/68     TONSILLECTOMY  3/1/1968     WRIST SURGERY      wrist arthrodesis       Family History   Problem Relation Age of Onset     Heart Disease Father         AAA     Hypertension Father      Neurologic Disorder Mother         Anuerysm of Cerebral Artery, Dementia     Diabetes Mother      Thyroid Disease Mother         ,     Cerebrovascular Disease Mother      Dementia Mother      Osteoporosis Mother      Diabetes Maternal Grandmother      Asthma Maternal Grandmother      Diabetes Maternal Aunt         x2     Melanoma Maternal Aunt      Glaucoma Maternal Aunt      Circulatory Brother         Perihperal Neurophathy     Dementia Other      Cancer Other         malignant melanoma     Hypertension Other      Hypertension Other      Cerebrovascular Disease Other      Cerebrovascular Disease Other      Obesity Other      Respiratory Other      Chronic Obstructive Pulmonary Disease Maternal Grandfather         father     Asthma Maternal Grandfather      Breast Cancer Cousin      Cancer Other      Diabetes Other      Asthma Other      Macular Degeneration No family hx of      Coronary Artery Disease No family hx of      Hyperlipidemia No family hx of      Kidney Disease No family hx of      Thrombosis No family hx of      Arthritis No family hx of      Depression No family hx of      Mental Illness No family hx of      Substance Abuse No family hx of      Cystic Fibrosis No family hx of      Early Death No family hx of      Coronary Artery Disease Early Onset No family hx of      Heart Failure No family hx of      Bleeding Diathesis No family hx of      Ovarian Cancer No family hx of      Uterine Cancer No family hx of      Prostate Cancer No family hx of      Colorectal Cancer No family hx of      Pancreatic Cancer No family hx of      Lung Cancer No family hx of      Other Cancer No family hx of      Autoimmune Disease No family hx of      Unknown/Adopted No family hx  of      Genetic Disorder No family hx of        Social History     Tobacco Use     Smoking status: Never Smoker     Smokeless tobacco: Never Used   Substance Use Topics     Alcohol use: Yes     Comment: Rare to occasional

## 2019-06-24 ENCOUNTER — OFFICE VISIT (OUTPATIENT)
Dept: SURGERY | Facility: CLINIC | Age: 69
End: 2019-06-24
Attending: INTERNAL MEDICINE
Payer: COMMERCIAL

## 2019-06-24 ENCOUNTER — OFFICE VISIT (OUTPATIENT)
Dept: PHARMACY | Facility: CLINIC | Age: 69
End: 2019-06-24
Payer: COMMERCIAL

## 2019-06-24 VITALS
SYSTOLIC BLOOD PRESSURE: 134 MMHG | OXYGEN SATURATION: 100 % | DIASTOLIC BLOOD PRESSURE: 62 MMHG | HEIGHT: 67 IN | HEART RATE: 87 BPM | WEIGHT: 178 LBS | BODY MASS INDEX: 27.94 KG/M2

## 2019-06-24 DIAGNOSIS — I10 BENIGN ESSENTIAL HYPERTENSION: Primary | ICD-10-CM

## 2019-06-24 DIAGNOSIS — Z00.00 NORMAL EXAM: Primary | ICD-10-CM

## 2019-06-24 PROCEDURE — 99207 ZZC NO CHARGE LOS: CPT | Performed by: PHARMACIST

## 2019-06-24 RX ORDER — AMLODIPINE BESYLATE 10 MG/1
10 TABLET ORAL DAILY
Qty: 90 TABLET | Refills: 3 | Status: SHIPPED | OUTPATIENT
Start: 2019-06-24 | End: 2020-05-04

## 2019-06-24 RX ORDER — METOPROLOL SUCCINATE 50 MG/1
50 TABLET, EXTENDED RELEASE ORAL DAILY
Qty: 90 TABLET | Refills: 1 | Status: SHIPPED | OUTPATIENT
Start: 2019-06-24 | End: 2019-10-22

## 2019-06-24 RX ORDER — ACETAMINOPHEN 160 MG
6000 TABLET,DISINTEGRATING ORAL DAILY
Qty: 270 CAPSULE | Refills: 3 | Status: SHIPPED | OUTPATIENT
Start: 2019-06-24 | End: 2021-10-31

## 2019-06-24 ASSESSMENT — PAIN SCALES - GENERAL: PAINLEVEL: NO PAIN (0)

## 2019-06-24 ASSESSMENT — MIFFLIN-ST. JEOR: SCORE: 1380.03

## 2019-06-24 NOTE — PROGRESS NOTES
SUBJECTIVE/OBJECTIVE:                           Nadira Perez is a 68 year old female coming in for a follow-up for Medication Therapy Management.  She was referred to me from self and was previously seen by Bee Hudson, VilmaD.      Chief Complaint: Follow-up on hypertension    Allergies/ADRs: Reviewed in Epic  Tobacco: No tobacco use  Alcohol: Less than 1 beverage / month  Caffeine: 2-5 diet coke daily  Activity: She walks most days of the week and pilates reformer once weekly, she is actively working to lose weight  PMH: Reviewed in Epic    Medication Adherence/Access:  no issues reported    Hypertension: Current medications include amlodipine 10 mg daily (at night), lisinopril 40 mg daily, metoprolol XL 25 mg daily and triamterene-hydrochlorothiazide 37.5-25 mg daily.  When we last visited, she was having trouble remembering her evening amlodipine dose. I asked she take the entire dose in the morning to improve her adherence. She has done this and has seen some improvement in measurements but BP remains elevated.     This morning she had a measurement of 170/90s in the [facility].     Potassium   Date Value Ref Range Status   05/13/2019 3.4 3.4 - 5.3 mmol/L Final     Last Comprehensive Metabolic Panel:  Sodium   Date Value Ref Range Status   11/13/2018 138 133 - 144 mmol/L Final     Potassium   Date Value Ref Range Status   05/13/2019 3.4 3.4 - 5.3 mmol/L Final     Chloride   Date Value Ref Range Status   11/13/2018 103 94 - 109 mmol/L Final     Carbon Dioxide   Date Value Ref Range Status   11/13/2018 28 20 - 32 mmol/L Final     Anion Gap   Date Value Ref Range Status   11/13/2018 7 3 - 14 mmol/L Final     Glucose   Date Value Ref Range Status   11/13/2018 106 (H) 70 - 99 mg/dL Final     Urea Nitrogen   Date Value Ref Range Status   11/13/2018 19 7 - 30 mg/dL Final     Creatinine   Date Value Ref Range Status   05/13/2019 0.70 0.52 - 1.04 mg/dL Final     GFR Estimate   Date Value Ref Range Status   05/13/2019  89 >60 mL/min/[1.73_m2] Final     Comment:     Non  GFR Calc  Starting 12/18/2018, serum creatinine based estimated GFR (eGFR) will be   calculated using the Chronic Kidney Disease Epidemiology Collaboration   (CKD-EPI) equation.       Calcium   Date Value Ref Range Status   05/13/2019 9.8 8.5 - 10.1 mg/dL Final     BP Readings from Last 3 Encounters:   06/24/19 134/62   05/13/19 143/83   03/01/19 142/81     Today's Vitals: There were no vitals taken for this visit. - pt declines in favor of home measurements    ASSESSMENT:                             Current medications were reviewed today.     Medication Adherence: good, no issues identified    Hypertension: Improved. Patient is not meeting BP goal of < 130/80mmHg consistently. Patient would benefit from a small dose increase in metoprolol today to help achieve BP goals.     PLAN:                            1) Increase metoprolol Xl to 50 mg daily.      I spent 30 minutes with this patient today. All changes were made via collaborative practice agreement with Matilde Quiñonez. A copy of the visit note was provided to the patient's primary care provider.    Will follow up in 1 month via [employer] to check on blood pressures.     The patient was sent via [employer] a summary of these recommendations as an after visit summary.     Kisha Heller, Pharm.D., [facility]  Medication Therapy Management Pharmacist  Page/VM:  [phone]

## 2019-06-24 NOTE — PATIENT INSTRUCTIONS
You met with Dr. Richard Encarnacion.      Today's visit instructions:    Return to the Surgery Clinic on an as needed basis.        If you have questions please contact Daniele RN or Miesha RN during regular clinic hours, Monday through Friday 7:30 AM - 4:00 PM, or you can contact us via [employer] at anytime.       If you have urgent needs after-hours, weekends, or holidays please call the hospital at [phone] and ask to speak with our on-call General Surgery Team.    Appointment [phone], option #1   Nurse Advice (Daniele or Miesha): [phone]   Surgery Scheduler (Elizabeth): [phone]  Fax: [phone]

## 2019-06-24 NOTE — PROGRESS NOTES
"Nadira Perez comes to clinic to evaluate lumpiness from a previously excised lipoma.  Ultrasound showed small \"micro trauma\" changes.      A/P: Normal postexcision exam.  Follow area.  Recurrence of lipoma is benign.  Risk of metastatic breast Ca lesion is minimal.      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     Thyroid disease        Past Surgical History:   Procedure Laterality Date     ABDOMEN SURGERY      ovarian cyst, mesh     ARTHRODESIS WRIST       ARTHRODESIS WRIST  2/14/2013    Procedure: ARTHRODESIS WRIST;  left wrist scaphoid excision, four bone fusion, iliac crest bone graft  ( Mac with block);  Surgeon: Av Mendez MD;  [facility]     BIOPSY      skin, vaginal     BIOPSY OF SKIN LESION       CATARACT IOL, RT/LT Right 03/13/2018     CATARACT IOL, RT/LT Left 02/20/2018     COLONOSCOPY  12/24/2013    Procedure: COMBINED COLONOSCOPY, SINGLE BIOPSY/POLYPECTOMY BY BIOPSY;  COLONOSCOPY;  Surgeon: Dom Alvarez MD;  Location:  GI     COSMETIC SURGERY       ESOPHAGOSCOPY, GASTROSCOPY, DUODENOSCOPY (EGD), COMBINED N/A 11/23/2016    Procedure: COMBINED ESOPHAGOSCOPY, GASTROSCOPY, DUODENOSCOPY (EGD);  Surgeon: Quinten Feliciano MD;  Location:  GI     EXTERNAL EAR SURGERY      right     EYE SURGERY      radial keratomy     GRAFT BONE FROM ILIAC CREST  2/14/2013    Procedure: GRAFT BONE FROM ILIAC CREST;  mac with block and local infilitration;  Surgeon: Av Mendez MD;  [facility]      BREATH HYDROGEN TEST N/A 10/14/2016    " Procedure: HYDROGEN BREATH TEST;  Surgeon: Cheri Barron MD;  Location:  GI     HERNIA REPAIR      UMBILICAL     HERNIA REPAIR      umbilical age 18 mos.     HERNIA REPAIR       HERNIORRHAPHY UMBILICAL  1[birth year]31[birth year]1954     HYSTERECTOMY TOTAL ABDOMINAL  5/3/00     HYSTERECTOMY TOTAL ABDOMINAL  5/3/2000     MASTECTOMY      PROPHYLACTIC RIGHT BREAST     MASTECTOMY MODIFIED RADICAL      LEFT BREAST     MASTECTOMY MODIFIED RADICAL      bilateral; right breast prophylactic     PARATHYROIDECTOMY  9/23/04    R SUPERIOR     PARATHYROIDECTOMY  9/23/2004    parathyroid resection, subtotal     REMOVE HARDWARE HAND  9/24/2013    Procedure: REMOVE HARDWARE HAND;  Left Hand Screw Removal        RHINOPLASTY  12/31/1985     RHINOPLASTY  12/31/1985     thyr proc skin closed cosmetic manner by subcuticular stitch  1/23/2009     TONSILLECTOMY  3/1/68     TONSILLECTOMY  3/1/1968     WRIST SURGERY      wrist arthrodesis          Allergies   Allergen Reactions     Penicillins Hives         Current Outpatient Medications:      Acetaminophen (APAP 500 PO), Take 1,000 mg by mouth 2 times daily, Disp: , Rfl:      amLODIPine (NORVASC) 5 MG tablet, Take 1 tablet (5 mg) by mouth At Bedtime (Patient taking differently: Take 10 mg by mouth At Bedtime ), Disp: 90 tablet, Rfl: 3     anastrozole (ARIMIDEX) 1 MG tablet, Take 1 tablet (1 mg) by mouth daily, Disp: 90 tablet, Rfl: 3     atorvastatin (LIPITOR) 40 MG tablet, Take 1 tablet (40 mg) by mouth daily Please put on profile- pt just received 90-day supply of cholesterol meds, Disp: 90 tablet, Rfl: 3     cholecalciferol (VITAMIN D3) 5000 UNITS CAPS capsule, Take 1 capsule (5,000 Units) by mouth daily (Patient taking differently: Take 6,000 Units by mouth daily 6000 Units Daily), Disp: 120 capsule, Rfl: 3     Coenzyme Q10 (COQ10) 200 MG CAPS, Take 1 capsule by mouth daily, Disp: , Rfl:      gabapentin (NEURONTIN) 300 MG capsule, Take 1-2 capsules by mouth every 8 hours, Disp: 540  capsule, Rfl: 3     glucosamine-chondroitin 500-400 MG CAPS per capsule, Take 1 capsule by mouth 2 times daily , Disp: , Rfl:      HYDROcodone-acetaminophen (NORCO) 5-325 MG per tablet, Take 1 tablet by mouth every 6 hours as needed, Disp: 40 tablet, Rfl: 0     ibuprofen (ADVIL/MOTRIN) 600 MG tablet, Take 600 mg by mouth every 6 hours as needed for moderate pain, Disp: , Rfl:      ketoconazole (NIZORAL) 2 % shampoo, Apply topically daily as needed for itching or irritation, Disp: 120 mL, Rfl: 11     levothyroxine (SYNTHROID/LEVOTHROID) 112 MCG tablet, Take 0.5 tab daily, Disp: 45 tablet, Rfl: 3     lisinopril (PRINIVIL/ZESTRIL) 40 MG tablet, Take 1 tablet (40 mg) by mouth daily, Disp: 90 tablet, Rfl: 2     LORazepam (ATIVAN) 2 MG tablet, Take 1 tablet at night for sleep, Disp: 30 tablet, Rfl: 1     metoprolol succinate (TOPROL-XL) 25 MG 24 hr tablet, Take 1 tablet (25 mg) by mouth daily, Disp: 90 tablet, Rfl: 2     Multiple Vitamin (MULTI-VITAMIN) per tablet, Take 1 tablet by mouth daily., Disp: , Rfl:      Omega-3 Fatty Acids (OMEGA-3 FISH OIL PO), Take 1 capsule by mouth daily , Disp: , Rfl:      ondansetron (ZOFRAN-ODT) 4 MG ODT tab, 1-2 tabs po prn every 6 hours, Disp: 90 tablet, Rfl: 2     order for DME, Equipment being ordered: sleeve/gauntlet/glove 20-40 mmHg, lymphedema bandaging supplies, Disp: 1 each, Rfl: 12     order for DME, Equipment being ordered: prosthetic breasts, Disp: 2 each, Rfl: 12     potassium chloride SA (K-DUR/KLOR-CON M) 20 MEQ CR tablet, Take 1 tablet (20 mEq) by mouth 2 times daily, Disp: 8 tablet, Rfl: 3     terbinafine (LAMISIL AT) 1 % cream, Apply topically 2 times daily For fungal infection not resolved with other antifungals (e.g. Clotrimazole), Disp: 24 g, Rfl: 11     triamcinolone (KENALOG) 0.1 % ointment, Apply topically 2 times daily, Disp: 30 g, Rfl: 11     triamterene-hydrochlorothiazide (MAXZIDE-25) 37.5-25 MG per tablet, Take 1 tablet by mouth daily, Disp: 90 tablet, Rfl:  "3     Vaginal Lubricant (REPHRESH) GEL, Place 2 g vaginally every 3 days, Disp: 14 Box, Rfl: 3     VOLTAREN 1 % GEL topical gel, Apply 4 grams to knees or 2 grams to hands four times daily using enclosed dosing card., Disp: 100 g, Rfl: 1    Current Facility-Administered Medications:      lidocaine 1 % injection 4 mL, 4 mL, , , Willis Borjas, DO, 4 mL at 11/30/18 1338     triamcinolone (KENALOG-40) injection 40 mg, 40 mg, , , Willis Borjas, DO, 40 mg at 11/30/18 1338    family history includes Asthma in her maternal grandfather, maternal grandmother, and another family member; Breast Cancer in her cousin; Cancer in some other family members; Cerebrovascular Disease in her mother and other family members; Chronic Obstructive Pulmonary Disease in her maternal grandfather; Circulatory in her brother; Dementia in her mother and another family member; Diabetes in her maternal aunt, maternal grandmother, mother, and another family member; Glaucoma in her maternal aunt; Heart Disease in her father; Hypertension in her father and other family members; Melanoma in her maternal aunt; Neurologic Disorder in her mother; Obesity in an other family member; Osteoporosis in her mother; Respiratory in an other family member; Thyroid Disease in her mother.    Social History     Tobacco Use     Smoking status: Never Smoker     Smokeless tobacco: Never Used   Substance Use Topics     Alcohol use: Yes     Comment: Rare to occasional     Drug use: No       EXAM  /62 (BP Location: Right arm, Patient Position: Chair, Cuff Size: Adult Regular)   Pulse 87   Ht 1.702 m (5' 7\")   Wt 80.7 kg (178 lb)   SpO2 100%   BMI 27.88 kg/m      Lumpy scar tissue around the previous scar  "

## 2019-06-24 NOTE — LETTER
"6/24/2019       RE: Nadira Perez  [address]  [facility] [address]     Dear Colleague,    Thank you for referring your patient, Nadira Perez, to the [facility] SURGERY at [facility]. Please see a copy of my visit note below.    Nadira Perez comes to clinic to evaluate lumpiness from a previously excised lipoma.  Ultrasound showed small \"micro trauma\" changes.      A/P: Normal postexcision exam.  Follow area.  Recurrence of lipoma is benign.  Risk of metastatic breast Ca lesion is minimal.      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     Thyroid disease        Past Surgical History:   Procedure Laterality Date     ABDOMEN SURGERY      ovarian cyst, mesh     ARTHRODESIS WRIST       ARTHRODESIS WRIST  2/14/2013    Procedure: ARTHRODESIS WRIST;  left wrist scaphoid excision, four bone fusion, iliac crest bone graft  ( Mac with block);  Surgeon: Av Mendez MD;  [facility]: [facility]     BIOPSY      skin, vaginal     BIOPSY OF SKIN LESION       CATARACT IOL, RT/LT Right 03/13/2018     CATARACT IOL, RT/LT Left 02/20/2018     COLONOSCOPY  12/24/2013    Procedure: COMBINED COLONOSCOPY, SINGLE BIOPSY/POLYPECTOMY BY BIOPSY;  COLONOSCOPY;  Surgeon: Dom Alvarez MD;  Location:  GI     COSMETIC SURGERY       ESOPHAGOSCOPY, GASTROSCOPY, DUODENOSCOPY (EGD), COMBINED N/A 11/23/2016    Procedure: COMBINED ESOPHAGOSCOPY, GASTROSCOPY, DUODENOSCOPY (EGD);  Surgeon: Quinten Feliciano MD;  Location:  GI     EXTERNAL EAR " SURGERY      right     EYE SURGERY      radial keratomy     GRAFT BONE FROM ILIAC CREST  2/14/2013    Procedure: GRAFT BONE FROM ILIAC CREST;  mac with block and local infilitration;  Surgeon: Av Mendez MD;  [facility]: [facility]      BREATH HYDROGEN TEST N/A 10/14/2016    Procedure: HYDROGEN BREATH TEST;  Surgeon: Cheri Barron MD;  Location:  GI     HERNIA REPAIR      UMBILICAL     HERNIA REPAIR      umbilical age 18 mos.     HERNIA REPAIR       HERNIORRHAPHY UMBILICAL  1/31/1954     HYSTERECTOMY TOTAL ABDOMINAL  5/3/00     HYSTERECTOMY TOTAL ABDOMINAL  5/3/2000     MASTECTOMY      PROPHYLACTIC RIGHT BREAST     MASTECTOMY MODIFIED RADICAL      LEFT BREAST     MASTECTOMY MODIFIED RADICAL      bilateral; right breast prophylactic     PARATHYROIDECTOMY  9/23/04    R SUPERIOR     PARATHYROIDECTOMY  9/23/2004    parathyroid resection, subtotal     REMOVE HARDWARE HAND  9/24/2013    Procedure: REMOVE HARDWARE HAND;  Left Hand Screw Removal        RHINOPLASTY  12/31/1985     RHINOPLASTY  12/31/1985     thyr proc skin closed cosmetic manner by subcuticular stitch  1/23/2009     TONSILLECTOMY  3/1/68     TONSILLECTOMY  3/1/1968     WRIST SURGERY      wrist arthrodesis          Allergies   Allergen Reactions     Penicillins Hives         Current Outpatient Medications:      Acetaminophen (APAP 500 PO), Take 1,000 mg by mouth 2 times daily, Disp: , Rfl:      amLODIPine (NORVASC) 5 MG tablet, Take 1 tablet (5 mg) by mouth At Bedtime (Patient taking differently: Take 10 mg by mouth At Bedtime ), Disp: 90 tablet, Rfl: 3     anastrozole (ARIMIDEX) 1 MG tablet, Take 1 tablet (1 mg) by mouth daily, Disp: 90 tablet, Rfl: 3     atorvastatin (LIPITOR) 40 MG tablet, Take 1 tablet (40 mg) by mouth daily Please put on profile- pt just received 90-day supply of cholesterol meds, Disp: 90 tablet, Rfl: 3     cholecalciferol (VITAMIN D3) 5000 UNITS CAPS capsule, Take 1 capsule (5,000 Units) by mouth daily  (Patient taking differently: Take 6,000 Units by mouth daily 6000 Units Daily), Disp: 120 capsule, Rfl: 3     Coenzyme Q10 (COQ10) 200 MG CAPS, Take 1 capsule by mouth daily, Disp: , Rfl:      gabapentin (NEURONTIN) 300 MG capsule, Take 1-2 capsules by mouth every 8 hours, Disp: 540 capsule, Rfl: 3     glucosamine-chondroitin 500-400 MG CAPS per capsule, Take 1 capsule by mouth 2 times daily , Disp: , Rfl:      HYDROcodone-acetaminophen (NORCO) 5-325 MG per tablet, Take 1 tablet by mouth every 6 hours as needed, Disp: 40 tablet, Rfl: 0     ibuprofen (ADVIL/MOTRIN) 600 MG tablet, Take 600 mg by mouth every 6 hours as needed for moderate pain, Disp: , Rfl:      ketoconazole (NIZORAL) 2 % shampoo, Apply topically daily as needed for itching or irritation, Disp: 120 mL, Rfl: 11     levothyroxine (SYNTHROID/LEVOTHROID) 112 MCG tablet, Take 0.5 tab daily, Disp: 45 tablet, Rfl: 3     lisinopril (PRINIVIL/ZESTRIL) 40 MG tablet, Take 1 tablet (40 mg) by mouth daily, Disp: 90 tablet, Rfl: 2     LORazepam (ATIVAN) 2 MG tablet, Take 1 tablet at night for sleep, Disp: 30 tablet, Rfl: 1     metoprolol succinate (TOPROL-XL) 25 MG 24 hr tablet, Take 1 tablet (25 mg) by mouth daily, Disp: 90 tablet, Rfl: 2     Multiple Vitamin (MULTI-VITAMIN) per tablet, Take 1 tablet by mouth daily., Disp: , Rfl:      Omega-3 Fatty Acids (OMEGA-3 FISH OIL PO), Take 1 capsule by mouth daily , Disp: , Rfl:      ondansetron (ZOFRAN-ODT) 4 MG ODT tab, 1-2 tabs po prn every 6 hours, Disp: 90 tablet, Rfl: 2     order for DME, Equipment being ordered: sleeve/gauntlet/glove 20-40 mmHg, lymphedema bandaging supplies, Disp: 1 each, Rfl: 12     order for DME, Equipment being ordered: prosthetic breasts, Disp: 2 each, Rfl: 12     potassium chloride SA (K-DUR/KLOR-CON M) 20 MEQ CR tablet, Take 1 tablet (20 mEq) by mouth 2 times daily, Disp: 8 tablet, Rfl: 3     terbinafine (LAMISIL AT) 1 % cream, Apply topically 2 times daily For fungal infection not resolved  "with other antifungals (e.g. Clotrimazole), Disp: 24 g, Rfl: 11     triamcinolone (KENALOG) 0.1 % ointment, Apply topically 2 times daily, Disp: 30 g, Rfl: 11     triamterene-hydrochlorothiazide (MAXZIDE-25) 37.5-25 MG per tablet, Take 1 tablet by mouth daily, Disp: 90 tablet, Rfl: 3     Vaginal Lubricant (REPHRESH) GEL, Place 2 g vaginally every 3 days, Disp: 14 Box, Rfl: 3     VOLTAREN 1 % GEL topical gel, Apply 4 grams to knees or 2 grams to hands four times daily using enclosed dosing card., Disp: 100 g, Rfl: 1    Current Facility-Administered Medications:      lidocaine 1 % injection 4 mL, 4 mL, , , Willis Borjas, DO, 4 mL at 11/30/18 1338     triamcinolone (KENALOG-40) injection 40 mg, 40 mg, , , Willis Borjas DO, 40 mg at 11/30/18 1338    family history includes Asthma in her maternal grandfather, maternal grandmother, and another family member; Breast Cancer in her cousin; Cancer in some other family members; Cerebrovascular Disease in her mother and other family members; Chronic Obstructive Pulmonary Disease in her maternal grandfather; Circulatory in her brother; Dementia in her mother and another family member; Diabetes in her maternal aunt, maternal grandmother, mother, and another family member; Glaucoma in her maternal aunt; Heart Disease in her father; Hypertension in her father and other family members; Melanoma in her maternal aunt; Neurologic Disorder in her mother; Obesity in an other family member; Osteoporosis in her mother; Respiratory in an other family member; Thyroid Disease in her mother.    Social History     Tobacco Use     Smoking status: Never Smoker     Smokeless tobacco: Never Used   Substance Use Topics     Alcohol use: Yes     Comment: Rare to occasional     Drug use: No       EXAM  /62 (BP Location: Right arm, Patient Position: Chair, Cuff Size: Adult Regular)   Pulse 87   Ht 1.702 m (5' 7\")   Wt 80.7 kg (178 lb)   SpO2 100%   BMI 27.88 kg/m       Lumpy scar tissue " around the previous scar    Again, thank you for allowing me to participate in the care of your patient.      Sincerely,    Dhiraj Encarnacion MD

## 2019-06-24 NOTE — NURSING NOTE
"Chief Complaint   Patient presents with     Consult     new pt here forconsult on ? lipoma on R lower back       Vitals:    06/24/19 1436   BP: 134/62   BP Location: Right arm   Patient Position: Chair   Cuff Size: Adult Regular   Pulse: 87   SpO2: 100%   Weight: 80.7 kg (178 lb)   Height: 1.702 m (5' 7\")       Body mass index is 27.88 kg/m .    Troy Andrews, EMT    "

## 2019-07-22 ENCOUNTER — OFFICE VISIT (OUTPATIENT)
Dept: ORTHOPEDICS | Facility: CLINIC | Age: 69
End: 2019-07-22
Payer: COMMERCIAL

## 2019-07-22 ENCOUNTER — ANCILLARY PROCEDURE (OUTPATIENT)
Dept: GENERAL RADIOLOGY | Facility: CLINIC | Age: 69
End: 2019-07-22
Attending: FAMILY MEDICINE
Payer: COMMERCIAL

## 2019-07-22 VITALS — BODY MASS INDEX: 27.94 KG/M2 | HEIGHT: 67 IN | WEIGHT: 178 LBS

## 2019-07-22 DIAGNOSIS — M79.675 PAIN OF TOE OF LEFT FOOT: Primary | ICD-10-CM

## 2019-07-22 ASSESSMENT — MIFFLIN-ST. JEOR: SCORE: 1375.03

## 2019-07-22 NOTE — PROGRESS NOTES
SPORTS & ORTHOPEDIC WALK-IN VISIT 7/22/2019    Primary Care Physician: Dr. Quiñonez       Reason for visit:     What part of your body is injured / painful?  left foot    What caused the injury /pain? Fall    How long ago did your injury occur or pain begin? 7/20/19    What are your most bothersome symptoms? Pain, Swelling, and ROM     How would you characterize your symptom?  aching    What makes your symptoms better? Ibuprofen    What makes your symptoms worse? Walking    Have you been previously seen for this problem? No    Medical History:    Any recent changes to your medical history? No    Any new medication prescribed since last visit? No    Have you had surgery on this body part before? No    Social History:    Occupation: RN at [employer]     Handedness: Right    Exercise: 3-4 days/week    Review of Systems:    Do you have fever, chills, weight loss? No    Do you have any vision problems? No    Do you have any chest pain or edema? No    Do you have any shortness of breath or wheezing?  No    Do you have stomach problems? No    Do you have any numbness or focal weakness? No    Do you have diabetes? No    Do you have problems with bleeding or clotting? No    Do you have an rashes or other skin lesions? No

## 2019-07-22 NOTE — LETTER
2019       RE: Nadira Perez  [address]  [facility] [address]     Dear Colleague,    Thank you for referring your patient, Nadira Perez, to the [facility] SPORTS AND ORTHOPAEDIC WALK IN CLINIC at [facility]. Please see a copy of my visit note below.    [facility]  Orthopedics  Bob Arredondo MD  2019     Name: Nadira Perez  MRN: [id number]  Age: 69 year old  : 1950  Referring provider: Referred Self     Chief Complaint: Pain of the Left Foot     Date of Injury: 19    History of Present Illness:   Nadira Perez is a 69 year old, female with a history of osteoporosis and lower extremity neuropathy who presents today for evaluation of left fourth toe pain. The patient reports falling in her garage on 2019 that onset aching pain and swelling into her left fourth toe. She also has associated blackish discoloration over the fourth toe.  Her pain is exacerbated with walking. Takes ibuprofen for pain relief. Her numbness and tingling secondary to neuropathy has not changed since the incident. Of note, she has lost two inches in her height which she believes isfrom chemotherapy and thyroid disease medication. She voices no further concerns at this time.     Review of Systems:   A 10-point review of systems was obtained and is negative except for as noted in the HPI.     Medications:     Current Outpatient Medications:      Acetaminophen (APAP 500 PO), Take 1,000 mg by mouth 2 times daily, Disp: , Rfl:      amLODIPine (NORVASC) 10 MG tablet, Take 1 tablet (10 mg) by mouth daily, Disp: 90 tablet, Rfl: 3     anastrozole (ARIMIDEX) 1 MG tablet, Take 1 tablet (1 mg) by mouth daily, Disp: 90 tablet, Rfl: 3     atorvastatin (LIPITOR) 40 MG tablet, Take 1 tablet (40 mg) by mouth daily Please put on profile- pt just received 90-day supply of cholesterol meds, Disp: 90 tablet, Rfl: 3     cholecalciferol 2000 units CAPS, Take 6,000 Units by  mouth daily, Disp: 270 capsule, Rfl: 3     Coenzyme Q10 (COQ10) 200 MG CAPS, Take 1 capsule by mouth daily, Disp: , Rfl:      gabapentin (NEURONTIN) 300 MG capsule, Take 1-2 capsules by mouth every 8 hours, Disp: 540 capsule, Rfl: 3     glucosamine-chondroitin 500-400 MG CAPS per capsule, Take 1 capsule by mouth 2 times daily , Disp: , Rfl:      HYDROcodone-acetaminophen (NORCO) 5-325 MG per tablet, Take 1 tablet by mouth every 6 hours as needed, Disp: 40 tablet, Rfl: 0     ibuprofen (ADVIL/MOTRIN) 600 MG tablet, Take 600 mg by mouth every 6 hours as needed for moderate pain, Disp: , Rfl:      ketoconazole (NIZORAL) 2 % shampoo, Apply topically daily as needed for itching or irritation (Patient not taking: Reported on 6/24/2019), Disp: 120 mL, Rfl: 11     levothyroxine (SYNTHROID/LEVOTHROID) 112 MCG tablet, Take 0.5 tab daily, Disp: 45 tablet, Rfl: 3     lisinopril (PRINIVIL/ZESTRIL) 40 MG tablet, Take 1 tablet (40 mg) by mouth daily, Disp: 90 tablet, Rfl: 2     LORazepam (ATIVAN) 2 MG tablet, Take 1 tablet at night for sleep, Disp: 30 tablet, Rfl: 1     metoprolol succinate ER (TOPROL-XL) 50 MG 24 hr tablet, Take 1 tablet (50 mg) by mouth daily, Disp: 90 tablet, Rfl: 1     Multiple Vitamin (MULTI-VITAMIN) per tablet, Take 1 tablet by mouth daily., Disp: , Rfl:      Omega-3 Fatty Acids (OMEGA-3 FISH OIL PO), Take 1 capsule by mouth daily , Disp: , Rfl:      order for DME, Equipment being ordered: sleeve/gauntlet/glove 20-40 mmHg, lymphedema bandaging supplies, Disp: 1 each, Rfl: 12     order for DME, Equipment being ordered: prosthetic breasts, Disp: 2 each, Rfl: 12     terbinafine (LAMISIL AT) 1 % cream, Apply topically 2 times daily For fungal infection not resolved with other antifungals (e.g. Clotrimazole), Disp: 24 g, Rfl: 11     triamcinolone (KENALOG) 0.1 % ointment, Apply topically 2 times daily, Disp: 30 g, Rfl: 11     triamterene-hydrochlorothiazide (MAXZIDE-25) 37.5-25 MG per tablet, Take 1 tablet by  "mouth daily, Disp: 90 tablet, Rfl: 3     Vaginal Lubricant (REPHRESH) GEL, Place 2 g vaginally every 3 days, Disp: 14 Box, Rfl: 3     VOLTAREN 1 % GEL topical gel, Apply 4 grams to knees or 2 grams to hands four times daily using enclosed dosing card., Disp: 100 g, Rfl: 1    Current Facility-Administered Medications:      lidocaine 1 % injection 4 mL, 4 mL, , , Willis Borjas DO, 4 mL at 11/30/18 1338    Allergies:  Penicillins     Past Medical History:  Arthritis   Bone disease  Breast cancer  Kidney stones  Hyperlipidemia  Hypertension  Hypopotassemia   Osteopenia  PONV  Squamous cell skin cancer  Thyroid disease  Medullary sponge kidney     Past Surgical History:  Abdomen surgery   wrist arthrodesis   Colonoscopy   EGD, combined   External ear surgery   Radial keratomy   Bone graft from iliac crest   Hysterectomy  Mastectomy modified radical   Parathyroidectomy   Rhinoplasty  Tonsillectomy     Social History:  Patient is . She denies tobacco use and admits to rare alcohol use.     Family History:  Father - AAA, hypertension  Mother - hypertension, cerebrovascular disease, dementia, osteoporosis   Maternal grandmother - diabetes, asthma  Maternal Aunt - diabetes   Brother - peripheral neuropathy     Physical Examination:  Height 1.702 m (5' 7\"), weight 80.7 kg (178 lb), not currently breastfeeding.    General: Alert, pleasant, no distress  Left fourth toe: Mild soft tissue swelling with ecchymosis diffusely.  There is diffuse tenderness to palpation about the toe.  No specific tenderness over the IP or MTP joints.  Is able to gently flex and extend the toe without significant discomfort.  does not have pain with any passive range of motion testing.  No new sensory deficit.  Capillary refill is brisk.    Imaging:   Radiographs of the left toe - 2 views (07/22/2019)  No obvious displaced fracture or dislocation.  No significant degenerative disease.  See EMR for formal radiology report.    I have " independently reviewed the above imaging studies; the results were discussed with the patient.     Assessment:   69 year old, female with left fourth toe pain after injury.  No sign of fracture.  Suspect soft tissue injury or contusion.    Plan:   Reviewed the imaging with the patient in detail.   Discussed options for treatment including icing, elevation, and supportive footwear.  Could consider buddy taping.  Follow-up with me as needed if symptoms do not improve.     Bob Arredondo MD    Scribe Disclosure:  I, Tyson [place], am serving as a scribe to document services personally performed by Bob Arredondo MD at this visit, based upon the provider's statements to me. All documentation has been reviewed by the aforementioned provider prior to being entered into the official medical record.           SPORTS & ORTHOPEDIC WALK-IN VISIT 7/22/2019    Primary Care Physician: Dr. Quiñonez     Reason for visit:     What part of your body is injured / painful?  left foot    What caused the injury /pain? Fall    How long ago did your injury occur or pain begin? 7/20/19    What are your most bothersome symptoms? Pain, Swelling, and ROM     How would you characterize your symptom?  aching    What makes your symptoms better? Ibuprofen    What makes your symptoms worse? Walking    Have you been previously seen for this problem? No    Medical History:    Any recent changes to your medical history? No    Any new medication prescribed since last visit? No    Have you had surgery on this body part before? No    Social History:    Occupation: RN at [employer]     Handedness: Right    Exercise: 3-4 days/week    Review of Systems:    Do you have fever, chills, weight loss? No    Do you have any vision problems? No    Do you have any chest pain or edema? No    Do you have any shortness of breath or wheezing?  No    Do you have stomach problems? No    Do you have any numbness or focal weakness? No    Do you have diabetes? No    Do you  have problems with bleeding or clotting? No    Do you have an rashes or other skin lesions? No

## 2019-07-22 NOTE — PROGRESS NOTES
[facility]  Orthopedics  Bob Arredondo MD  2019     Name: Nadira Perez  MRN: [id number]  Age: 69 year old  : 1950  Referring provider: Referred Self     Chief Complaint: Pain of the Left Foot     Date of Injury: 19    History of Present Illness:   Nadira Perez is a 69 year old, female with a history of osteoporosis and lower extremity neuropathy who presents today for evaluation of left fourth toe pain. The patient reports falling in her garage on 2019 that onset aching pain and swelling into her left fourth toe. She also has associated blackish discoloration over the fourth toe.  Her pain is exacerbated with walking. Takes ibuprofen for pain relief. Her numbness and tingling secondary to neuropathy has not changed since the incident. Of note, she has lost two inches in her height which she believes isfrom chemotherapy and thyroid disease medication. She voices no further concerns at this time.     Review of Systems:   A 10-point review of systems was obtained and is negative except for as noted in the HPI.     Medications:     Current Outpatient Medications:      Acetaminophen (APAP 500 PO), Take 1,000 mg by mouth 2 times daily, Disp: , Rfl:      amLODIPine (NORVASC) 10 MG tablet, Take 1 tablet (10 mg) by mouth daily, Disp: 90 tablet, Rfl: 3     anastrozole (ARIMIDEX) 1 MG tablet, Take 1 tablet (1 mg) by mouth daily, Disp: 90 tablet, Rfl: 3     atorvastatin (LIPITOR) 40 MG tablet, Take 1 tablet (40 mg) by mouth daily Please put on profile- pt just received 90-day supply of cholesterol meds, Disp: 90 tablet, Rfl: 3     cholecalciferol 2000 units CAPS, Take 6,000 Units by mouth daily, Disp: 270 capsule, Rfl: 3     Coenzyme Q10 (COQ10) 200 MG CAPS, Take 1 capsule by mouth daily, Disp: , Rfl:      gabapentin (NEURONTIN) 300 MG capsule, Take 1-2 capsules by mouth every 8 hours, Disp: 540 capsule, Rfl: 3     glucosamine-chondroitin 500-400 MG CAPS per capsule, Take 1 capsule by mouth 2  times daily , Disp: , Rfl:      HYDROcodone-acetaminophen (NORCO) 5-325 MG per tablet, Take 1 tablet by mouth every 6 hours as needed, Disp: 40 tablet, Rfl: 0     ibuprofen (ADVIL/MOTRIN) 600 MG tablet, Take 600 mg by mouth every 6 hours as needed for moderate pain, Disp: , Rfl:      ketoconazole (NIZORAL) 2 % shampoo, Apply topically daily as needed for itching or irritation (Patient not taking: Reported on 6/24/2019), Disp: 120 mL, Rfl: 11     levothyroxine (SYNTHROID/LEVOTHROID) 112 MCG tablet, Take 0.5 tab daily, Disp: 45 tablet, Rfl: 3     lisinopril (PRINIVIL/ZESTRIL) 40 MG tablet, Take 1 tablet (40 mg) by mouth daily, Disp: 90 tablet, Rfl: 2     LORazepam (ATIVAN) 2 MG tablet, Take 1 tablet at night for sleep, Disp: 30 tablet, Rfl: 1     metoprolol succinate ER (TOPROL-XL) 50 MG 24 hr tablet, Take 1 tablet (50 mg) by mouth daily, Disp: 90 tablet, Rfl: 1     Multiple Vitamin (MULTI-VITAMIN) per tablet, Take 1 tablet by mouth daily., Disp: , Rfl:      Omega-3 Fatty Acids (OMEGA-3 FISH OIL PO), Take 1 capsule by mouth daily , Disp: , Rfl:      order for DME, Equipment being ordered: sleeve/gauntlet/glove 20-40 mmHg, lymphedema bandaging supplies, Disp: 1 each, Rfl: 12     order for DME, Equipment being ordered: prosthetic breasts, Disp: 2 each, Rfl: 12     terbinafine (LAMISIL AT) 1 % cream, Apply topically 2 times daily For fungal infection not resolved with other antifungals (e.g. Clotrimazole), Disp: 24 g, Rfl: 11     triamcinolone (KENALOG) 0.1 % ointment, Apply topically 2 times daily, Disp: 30 g, Rfl: 11     triamterene-hydrochlorothiazide (MAXZIDE-25) 37.5-25 MG per tablet, Take 1 tablet by mouth daily, Disp: 90 tablet, Rfl: 3     Vaginal Lubricant (REPHRESH) GEL, Place 2 g vaginally every 3 days, Disp: 14 Box, Rfl: 3     VOLTAREN 1 % GEL topical gel, Apply 4 grams to knees or 2 grams to hands four times daily using enclosed dosing card., Disp: 100 g, Rfl: 1    Current Facility-Administered Medications:  "     lidocaine 1 % injection 4 mL, 4 mL, , , Willis Borjas DO, 4 mL at 11/30/18 2752    Allergies:  Penicillins     Past Medical History:  Arthritis   Bone disease  Breast cancer  Kidney stones  Hyperlipidemia  Hypertension  Hypopotassemia   Osteopenia  PONV  Squamous cell skin cancer  Thyroid disease  Medullary sponge kidney     Past Surgical History:  Abdomen surgery   wrist arthrodesis   Colonoscopy   EGD, combined   External ear surgery   Radial keratomy   Bone graft from iliac crest   Hysterectomy  Mastectomy modified radical   Parathyroidectomy   Rhinoplasty  Tonsillectomy     Social History:  Patient is . She denies tobacco use and admits to rare alcohol use.     Family History:  Father - AAA, hypertension  Mother - hypertension, cerebrovascular disease, dementia, osteoporosis   Maternal grandmother - diabetes, asthma  Maternal Aunt - diabetes   Brother - peripheral neuropathy     Physical Examination:  Height 1.702 m (5' 7\"), weight 80.7 kg (178 lb), not currently breastfeeding.    General: Alert, pleasant, no distress  Left fourth toe: Mild soft tissue swelling with ecchymosis diffusely.  There is diffuse tenderness to palpation about the toe.  No specific tenderness over the IP or MTP joints.  Is able to gently flex and extend the toe without significant discomfort.  does not have pain with any passive range of motion testing.  No new sensory deficit.  Capillary refill is brisk.    Imaging:   Radiographs of the left toe - 2 views (07/22/2019)  No obvious displaced fracture or dislocation.  No significant degenerative disease.  See EMR for formal radiology report.    I have independently reviewed the above imaging studies; the results were discussed with the patient.     Assessment:   69 year old, female with left fourth toe pain after injury.  No sign of fracture.  Suspect soft tissue injury or contusion.    Plan:   Reviewed the imaging with the patient in detail.   Discussed options for " treatment including icing, elevation, and supportive footwear.  Could consider buddy taping.  Follow-up with me as needed if symptoms do not improve.     Bob Arredondo MD    Scribe Disclosure:  I, Tyson Aguero, am serving as a scribe to document services personally performed by Bob Arredondo MD at this visit, based upon the provider's statements to me. All documentation has been reviewed by the aforementioned provider prior to being entered into the official medical record.

## 2019-09-30 ENCOUNTER — HEALTH MAINTENANCE LETTER (OUTPATIENT)
Age: 69
End: 2019-09-30

## 2019-10-02 ENCOUNTER — HOSPITAL ENCOUNTER (OUTPATIENT)
Dept: PHYSICAL THERAPY | Facility: CLINIC | Age: 69
Setting detail: THERAPIES SERIES
End: 2019-10-02
Attending: NURSE PRACTITIONER
Payer: COMMERCIAL

## 2019-10-02 PROCEDURE — 97112 NEUROMUSCULAR REEDUCATION: CPT | Mod: GP | Performed by: PHYSICAL THERAPIST

## 2019-10-02 PROCEDURE — 97161 PT EVAL LOW COMPLEX 20 MIN: CPT | Mod: GP | Performed by: PHYSICAL THERAPIST

## 2019-10-02 NOTE — PROGRESS NOTES
10/02/19 0900   Quick Adds   Quick Adds Certification;Vestibular Eval   Type of Visit Initial OP PT Evaluation   General Information   Referring Physician Matilde Quiñonez, MERCEDES CNP   Orders Evaluate and Treat as Indicated   Order Date 10/18/18   Medical Diagnosis Balance disorder R26.89   Onset of illness/injury or Date of Surgery 10/18/18  (Referral date used)   Precautions/Limitations fall precautions   Surgical/Medical history reviewed Yes   Pertinent history of current problem (include personal factors and/or comorbidities that impact the POC) Patient has a history of inflammatory breast cancer s/p radiation therapy, hypertension, osteoporosis, nephrolithiasis, and hypothyroidism. Patient reports she has a balance problem overall and has had several falls over the last year - estimates a couple times per month at first but now it is less frequent. Patient notes it is worse with turning and bending over, notes a little bit of balance dizziness. Patient reports she does pilates at the [facility], knelt and lost her balance - notes it may not be related to just her peripheral neuropathy. Patient has a handicap sticker because she is scared of falling in the winter. Patient has a DEXA scan in November. Patient notes she will fall over nothing, or will get her feet tangled. Patient fell in 4/2018 and twisted her right knee - found to have a meniscus tear which she has been doing PT on and off for since 12/2018.    Pertinent Visual History  Denies double or blurry vision   Prior level of function comment Patient IND with all ADLs, does pilates, likes to go for slow walks around her [facility] complex, and swims lightlly - no laps   Diagnostic Tests MRI   MRI Results Results   MRI results R knee meniscus tear  (2018)   Current Community Support Family/friend caregiver   Patient role/Employment history Employed  (Works for [employer], casual up to half time, educator)   Living environment House/[facility]    Home/Community Accessibility Comments Lives alone in a [facility], rents a room to someone and has family in the area. 1 flight of stairs down to the basement, does not go down often unless she is exercising   Assistive Devices Comments None, occasionally uses a borrowed cane   Patient/Family Goals Statement Improve balance   Fall Risk Screen   Fall screen completed by PT   Have you fallen 2 or more times in the past year? Yes   Have you fallen and had an injury in the past year? Yes   Timed Up and Go score (seconds) NT, see FGA   Is patient a fall risk? Yes;Department fall risk interventions implemented   Fall screen comments History of falls, FGA   Pain   Patient currently in pain Yes   Pain location LBP   Pain rating 4/10   Pain description Ache  (muscle pain)   Vitals Signs   Blood Pressure 150/92   Vital Signs Comments Patient denies symptoms, notes a little dizziness and lightheadedness with bending and twisting, right arm, seated, resting   Cognitive Status Examination   Orientation orientation to person, place and time   Level of Consciousness alert   Follows Commands and Answers Questions 100% of the time;able to follow multistep instructions   Personal Safety and Judgment intact   Memory intact   Integumentary   Integumentary Other   Integumentary Comments Notes no edema recently   Posture   Posture Forward head position   Range of Motion (ROM)   ROM Quick Adds no deficits were identified   Strength   Manual Muscle Testing Quick Adds MMT: Hip;MMT: Knee;MMT: Ankle   Strength Comments Performed in sitting   MMT: Hip, Rehab Eval   Hip Flexion - Left Side (4-/5) good minus, left   Hip ABduction - Left Side (4-/5) good minus, left   Hip ADduction - Left Side (4/5) good, left   Hip Flexion - Right Side (4-/5) good minus, right   Hip ABduction - Right Side (4-/5) good minus, right   Hip ADduction - Right Side (4/5) good, right   MMT: Knee, Rehab Eval   Knee Flexion - Left Side (5/5) normal,left   Knee Extension -  Left Side (5/5) normal,left   Knee Flexion - Right Side (5/5) normal,right   Knee Extension - Right Side (5/5) normal,right   MMT: Ankle, Rehab Eval   Ankle Dorsiflexion - Left Side (4-/5) good minus, left   Ankle Dorsiflexion - Right Side (4-/5) good minus, right   Transfer Skills   Transfer Comments IND with no UE assist   Gait Special Tests   Gait Special Tests 25 FOOT TIMED WALK;FUNCTIONAL GAIT ASSESSMENT   Gait Special Tests 25 Foot Timed Walk   Seconds 6.61 sec   Steps 13 Steps   Comments No AD   Gait Special Tests Functional Gait Assessment Score out of 30   Score out of 30 21   Comments <22 indicates increased risk for falls   Balance Special Tests   Balance Special Tests Modified CTSIB Conditions;Sit to stand reps   Balance Special Tests Modified CTSIB Conditions   Condition 1, seconds 30 Seconds   Condition 2, seconds 20 Seconds   Condition 4, seconds 30 Seconds   Condition 5, seconds 5 Seconds   Modified CTSIB Comments Impaired sensory organization   Balance Special Tests Sit to Stand Reps in 30 Seconds   Reps in 30 seconds 12   Height 18 inches   Sensory Examination   Sensory Perception other (describe)   Sensory Perception Comments Numbness all the time and tingling intermittently, pain with touching something with her toes, sensation loss in the tips of the feet   Coordination   Coordination no deficits were identified   Muscle Tone   Muscle Tone no deficits were identified   Oculomotor Exam   Smooth Pursuit Normal   Saccades Normal   VOR Normal   Rapid Head Thrust Corrective Saccade R head thrust   Modality Interventions   Planned Modality Interventions Comments Per therapist discretion   Planned Therapy Interventions   Planned Therapy Interventions balance training;gait training;neuromuscular re-education;strengthening;stretching   Clinical Impression   Criteria for Skilled Therapeutic Interventions Met yes, treatment indicated   PT Diagnosis Impaired functional balance and mobility   Influenced by  the following impairments Impaired balance, impaired LE sensation, dizziness/lightheadedness?, pain, proximal LE weakness, history of chemotherapy and osteoporosis   Functional limitations due to impairments Increased risk for falls with functional mobility, impaired safety and independence with ADLs and functional mobility   Clinical Presentation Stable/Uncomplicated   Clinical Presentation Rationale Medically stable   Clinical Decision Making (Complexity) Low complexity   Therapy Frequency 2 times/Week  (Decreasing in frequency as indicated)   Predicted Duration of Therapy Intervention (days/wks) 8 weeks   Risk & Benefits of therapy have been explained Yes   Patient, Family & other staff in agreement with plan of care Yes   Clinical Impression Comments Patient is a 69 year old female presenting to physical therapy with impaired balance and possible vestibular involvement. Patient presents with an increased risk for falls per the FGA and impaired sensory organization per the mCTSIB. Patient has a history of falls and osteoporosis and may benefit from skilled physical therapy to decrease her risk for falls and increase her safety and independence with functional mobility.    GOALS   PT Eval Goals 1;2;3;4;5   Goal 1   Goal Identifier HEP   Goal Description Patient will demonstrate understanding and compliance to her HEP for continued wellbeing upon discharge from skilled physical therapy.   Target Date 11/27/19   Goal 2   Goal Identifier FGA   Goal Description Patient will complete the FGA with a score of 28/30 to demonstrate improved balance and decreased risk for falls.   Target Date 11/27/19   Goal 3   Goal Identifier mCTSIB   Goal Description Patient will maintain her balance with feet together and eyes closed on foam for 30 seconds to demonstrate improved vestibular function and balance with low vision for safety with going to the bathroom at night.   Target Date 11/27/19   Goal 4   Goal Identifier Falls    Goal  Description Patient will deny falls in the home or community in the last month to demonstrate increased safety and independence with functional mobility.   Target Date 11/27/19   Goal 5   Goal Identifier DVA   Goal Description Patient will complete DVA testing with a loss of 4 lines or less between static and 2 Hz head movement to demonstrate improved gaze stability for return to activities without limitation.   Target Date 11/27/19   Total Evaluation Time   PT Eval, Low Complexity Minutes (88939) 30   Therapy Certification   Certification date from 10/02/19   Certification date to 11/27/19   Medical Diagnosis Balance disorder R26.89   Certification I certify the need for these services furnished under this plan of treatment and while under my care.  (Physician co-signature of this document indicates review and certification of the therapy plan).

## 2019-10-02 NOTE — PROGRESS NOTES
10/02/19 0900   Signing Clinician's Name / Credentials   Signing clinician's name / credentials Shraddha Hansen DPT   Functional Gait Assessment (RUBIO Hunt, EDDY Chan, et al. (2004))   1. GAIT LEVEL SURFACE 3   2. CHANGE IN GAIT SPEED 2   3. GAIT WITH HORIZONTAL HEAD TURNS 3   4. GAIT WITH VERTICAL HEAD TURNS 2   5. GAIT AND PIVOT TURN 2   6. STEP OVER OBSTACLE 3   7. GAIT WITH NARROW BASE OF SUPPORT 0   8. GAIT WITH EYES CLOSED 2   9. AMBULATING BACKWARDS 2   10. STEPS 2   Total Functional Gait Assessment Score   TOTAL SCORE: (MAXIMUM SCORE 30) 21     Functional Gait Assessment (FGA): The FGA assesses postural stability during various walking tasks.   Gait assistive device used: None     Patient Score: 21/30  Scores of <22/30 have been correlated with predicting falls in community-dwelling older adults according to Gilbert & Skyler 2010.   Scores of <18/30 have been correlated with increased risk for falls in patients with Parkinsons Disease according to Bro Colon, Dennis et al 2014.  Minimal Detectable Change for patients with acute/chronic stroke = 4.2 according to Thiajith & Ritschel 2009  Minimal Detectable Change for patients with vestibular disorder = 8 according to Gilbert & Skyler 2010    Assessment (rationale for performing, application to patient s function & care plan): Assess risk for falls  Minutes billed as physical performance test: 0

## 2019-10-02 NOTE — PROGRESS NOTES
[facility]        OUTPATIENT PHYSICAL THERAPY FUNCTIONAL EVALUATION  PLAN OF TREATMENT FOR OUTPATIENT REHABILITATION  (COMPLETE FOR INITIAL CLAIMS ONLY)  Patient's Last Name, First Name, M.I.  YOB: 1950  Nadira Perez     Provider's Name   [facility]   Medical Record No.  [id number]     Start of Care Date:   10/2/19   Onset Date:  10/18/18(Referral date used)   Type:     _X__PT   ____OT  ____SLP Medical Diagnosis:  Balance disorder R26.89     PT Diagnosis:  Impaired functional balance and mobility Visits from SOC:  1                              __________________________________________________________________________________  Plan of Treatment/Functional Goals:  balance training, gait training, neuromuscular re-education, strengthening, stretching           GOALS  HEP  Patient will demonstrate understanding and compliance to her HEP for continued wellbeing upon discharge from skilled physical therapy.  11/27/19    FGA  Patient will complete the FGA with a score of 28/30 to demonstrate improved balance and decreased risk for falls.  11/27/19    mCTSIB  Patient will maintain her balance with feet together and eyes closed on foam for 30 seconds to demonstrate improved vestibular function and balance with low vision for safety with going to the bathroom at night.  11/27/19    Falls   Patient will deny falls in the home or community in the last month to demonstrate increased safety and independence with functional mobility.  11/27/19    DVA  Patient will complete DVA testing with a loss of 4 lines or less between static and 2 Hz head movement to demonstrate improved gaze stability for return to activities without limitation.  11/27/19                                     Therapy Frequency:  2 times/Week(Decreasing in frequency as indicated)   Predicted Duration  of Therapy Intervention:  8 weeks    Shraddha Hansen, PT                                    I CERTIFY THE NEED FOR THESE SERVICES FURNISHED UNDER        THIS PLAN OF TREATMENT AND WHILE UNDER MY CARE     (Physician co-signature of this document indicates review and certification of the therapy plan).                Certification Date From:  10/02/19   Certification Date To:  11/27/19    Referring Provider:  Matilde Quiñonez APRN CNP    Initial Assessment  See Epic Evaluation-

## 2019-10-09 ENCOUNTER — TELEPHONE (OUTPATIENT)
Dept: INTERNAL MEDICINE | Facility: CLINIC | Age: 69
End: 2019-10-09

## 2019-10-09 NOTE — TELEPHONE ENCOUNTER
[facility]    Phone Message    May a detailed message be left on voicemail: yes    Reason for Call: Symptoms or Concerns     If patient has red-flag symptoms, warm transfer to triage line    Current symptom or concern: Cyst    Symptoms have been present for:  2 day(s)    Has patient previously been seen for this? Yes    By : Olamide    Date: Unknown    Are there any new or worsening symptoms? Yes: Pt states cyst on rear end is now size of large marble, red, inflamed, happened within last day or two. Would like recommendations. Hurts to sit.       Action Taken: Message routed to:  [facility] ([facility] LUISA

## 2019-10-09 NOTE — TELEPHONE ENCOUNTER
I spoke with the patient, and confirmed that she has an appointment scheduled with Matilde for tomorrow 10/10. The patient is concerned as she has had MRSA in the past, and the inflammation and redness happened very quickly, almost overnight. I let her know that it was good she had an appointment, and for now to keep the area clean and dry. The patient voiced understanding and said that she would keep her appointment for tomorrow for further evaluation.     Audelia Flood LPN, EMT at 2:20 PM on 10/9/2019.

## 2019-10-10 ENCOUNTER — OFFICE VISIT (OUTPATIENT)
Dept: INTERNAL MEDICINE | Facility: CLINIC | Age: 69
End: 2019-10-10
Payer: COMMERCIAL

## 2019-10-10 ENCOUNTER — HOSPITAL ENCOUNTER (OUTPATIENT)
Dept: PHYSICAL THERAPY | Facility: CLINIC | Age: 69
Setting detail: THERAPIES SERIES
End: 2019-10-10
Attending: NURSE PRACTITIONER
Payer: COMMERCIAL

## 2019-10-10 VITALS
HEART RATE: 90 BPM | OXYGEN SATURATION: 96 % | WEIGHT: 180 LBS | SYSTOLIC BLOOD PRESSURE: 133 MMHG | BODY MASS INDEX: 28.19 KG/M2 | DIASTOLIC BLOOD PRESSURE: 84 MMHG

## 2019-10-10 DIAGNOSIS — G57.31: Primary | ICD-10-CM

## 2019-10-10 PROCEDURE — 97112 NEUROMUSCULAR REEDUCATION: CPT | Mod: GP | Performed by: PHYSICAL THERAPIST

## 2019-10-10 RX ORDER — SULFAMETHOXAZOLE/TRIMETHOPRIM 800-160 MG
1 TABLET ORAL 2 TIMES DAILY
Qty: 14 TABLET | Refills: 0 | Status: SHIPPED | OUTPATIENT
Start: 2019-10-10 | End: 2020-05-12

## 2019-10-10 NOTE — NURSING NOTE
Chief Complaint   Patient presents with     Mass     pt thinks it is an inflamed cyst     Kimberly Nissen, EMT at 11:05 AM on 10/10/2019

## 2019-10-10 NOTE — PROGRESS NOTES
[facility]  Primary Care Center   Matilde BENSONFani Jacobjordon, APRN CNP  10/10/2019      Chief Complaint:   Mass     History of Present Illness:   Nadira Perez is a 69 year old female with a history of breast cancer, who presents for evaluation of inflamed cyst. She recalls that when she had MRSA after wrist surgery, she had small lumps in her groin area, smaller than a tapioca seed. These were not concerning as they were very small. Yesterday while washing the area, however, she noticed one of them was larger (marble-sized) and red. It is also very painful to the touch now. It is not draining.      Review of Systems:   Pertinent items are noted in HPI or as in patient entered ROS below, remainder of complete ROS is negative.     Active Medications:      Acetaminophen (APAP 500 PO), Take 1,000 mg by mouth 2 times daily, Disp: , Rfl:      amLODIPine (NORVASC) 10 MG tablet, Take 1 tablet (10 mg) by mouth daily, Disp: 90 tablet, Rfl: 3     anastrozole (ARIMIDEX) 1 MG tablet, Take 1 tablet (1 mg) by mouth daily, Disp: 90 tablet, Rfl: 3     atorvastatin (LIPITOR) 40 MG tablet, Take 1 tablet (40 mg) by mouth daily Please put on profile- pt just received 90-day supply of cholesterol meds, Disp: 90 tablet, Rfl: 3     cholecalciferol 2000 units CAPS, Take 6,000 Units by mouth daily, Disp: 270 capsule, Rfl: 3     Coenzyme Q10 (COQ10) 200 MG CAPS, Take 1 capsule by mouth daily, Disp: , Rfl:      gabapentin (NEURONTIN) 300 MG capsule, Take 1-2 capsules by mouth every 8 hours, Disp: 540 capsule, Rfl: 3     glucosamine-chondroitin 500-400 MG CAPS per capsule, Take 1 capsule by mouth 2 times daily , Disp: , Rfl:      HYDROcodone-acetaminophen (NORCO) 5-325 MG per tablet, Take 1 tablet by mouth every 6 hours as needed, Disp: 40 tablet, Rfl: 0     ibuprofen (ADVIL/MOTRIN) 600 MG tablet, Take 600 mg by mouth every 6 hours as needed for moderate pain, Disp: , Rfl:      levothyroxine (SYNTHROID/LEVOTHROID) 112 MCG tablet, Take 0.5 tab  daily, Disp: 45 tablet, Rfl: 3     lisinopril (PRINIVIL/ZESTRIL) 40 MG tablet, Take 1 tablet (40 mg) by mouth daily, Disp: 90 tablet, Rfl: 2     LORazepam (ATIVAN) 2 MG tablet, Take 1 tablet at night for sleep, Disp: 30 tablet, Rfl: 1     metoprolol succinate ER (TOPROL-XL) 50 MG 24 hr tablet, Take 1 tablet (50 mg) by mouth daily, Disp: 90 tablet, Rfl: 1     Multiple Vitamin (MULTI-VITAMIN) per tablet, Take 1 tablet by mouth daily., Disp: , Rfl:      Omega-3 Fatty Acids (OMEGA-3 FISH OIL PO), Take 1 capsule by mouth daily , Disp: , Rfl:      terbinafine (LAMISIL AT) 1 % cream, Apply topically 2 times daily For fungal infection not resolved with other antifungals (e.g. Clotrimazole), Disp: 24 g, Rfl: 11     triamcinolone (KENALOG) 0.1 % ointment, Apply topically 2 times daily, Disp: 30 g, Rfl: 11     triamterene-hydrochlorothiazide (MAXZIDE-25) 37.5-25 MG per tablet, Take 1 tablet by mouth daily, Disp: 90 tablet, Rfl: 3     Vaginal Lubricant (REPHRESH) GEL, Place 2 g vaginally every 3 days, Disp: 14 Box, Rfl: 3     VOLTAREN 1 % GEL topical gel, Apply 4 grams to knees or 2 grams to hands four times daily using enclosed dosing card., Disp: 100 g, Rfl: 1     Allergies:   Penicillins      Past Medical History:  Arthritis  Bone disease  Breast cancer  Hearing problem   Kidney stones  Hyperlipidemia  Hypertension  Hypopotassemia  Lymphedema  Medullary sponge kidney  Osteopenia  Reduced vision  Squamous cell sin cancer  Thyroid disease  Murmurs  Nephrolithiasis  Osteoarthrosis  Inflamed seborrheic keratosis  Osteoporosis  Hypovitaminosis D  Contact dermatitis  Anterior basement membrane dystrophy, both eyes  Corneal opacity  Chronic pain of right knee     Past Surgical History:  Abdomen surgery (ovarian cyst, mesh)  Arthrodesis, wrist, left - 02/14/2013  Vaginal skin biopsy  Cataract IOL - 03/13/2018 (right), 02/20/2018 (left)  Colonoscopy - 12/24/2013  Cosmetic surgery  EGD - 11/23/2016  Right external ear surgery  Radial  keratotomy   Graft bone from iliac crest - 02/14/2013  Umbilical hernia repair - 1954  Total hysterectomy - 05/03/2000  Mastectomy, bilateral   Parathyroidectomy - 09/23/2004  Rhinoplasty - 12/31/1985  Tonsillectomy - 03/01/1968  Remove hardware hand - 09/24/2013      Family History:   Heart disease - father (AAA)  Hypertension - father  Neurologic disorder - mother (aneurysm of cerebral artery, dementia)  Diabetes - mother, maternal grandmother, maternal aunts (x2)  Thyroid disease - mother  Cerebrovascular disease - mother  Osteoporosis - mother  Melanoma - maternal aunt  Glaucoma - maternal aunt  Peripheral neuropathy - brother  Obesity - other  Chronic obstructive pulmonary disease (COPD) - maternal grandfather, father  Asthma - maternal grandfather   Breast cancer - cousin     Social History:   The patient is , a nonsmoker, and does occasionally consume alcohol.      Physical Exam:   /84   Pulse 90   Wt 81.6 kg (180 lb)   SpO2 96%   BMI 28.19 kg/m     Wt Readings from Last 1 Encounters:   10/10/19 81.6 kg (180 lb)     Constitutional: no distress, comfortable, pleasant   Cardiovascular: see VS  Respiratory:no distress  Musculoskeletal: full range of motion, no edema   Skin: 2 cm x 2 cm x 1.5 cm red fluctuant lesion, painful to palpation, no discharge      Neurological:A and O x 3, good historian.  Psychological: appropriate mood, good eye contact, normal affect      Assessment and Plan:  Peroneal cyst, right  Perineal cyst. Very tender will treat with Bactrim, twice a day for seven days. Encouraged her to apply hot packs or warm washcloths to the area at least twice a day to help it drain. She can also take warm baths to drain it but should ensure the area stays clean. Will follow-up on 10/22/2019 when she is here for her physical.  - sulfamethoxazole-trimethoprim (BACTRIM DS/SEPTRA DS) 800-160 MG tablet  Dispense: 14 tablet; Refill: 0     Follow-up: She has an appt next week.     Total time  spent 15 minutes.  More than 50% of the time spent with Ms. Perez on counseling / coordinating her care.    Matilde LONG CNP         Scribe Disclosure:  I, Suzi Epps, am serving as a scribe to document services personally performed by MERCEDES Kyle CNP at this visit, based upon the provider's statements to me. All documentation has been reviewed by the aforementioned provider prior to being entered into the official medical record.     Portions of this medical record were completed by a scribe. UPON MY REVIEW AND AUTHENTICATION BY ELECTRONIC SIGNATURE, this confirms (a) I performed the applicable clinical services, and (b) the record is accurate.

## 2019-10-15 ENCOUNTER — HOSPITAL ENCOUNTER (OUTPATIENT)
Dept: PHYSICAL THERAPY | Facility: CLINIC | Age: 69
Setting detail: THERAPIES SERIES
End: 2019-10-15
Attending: NURSE PRACTITIONER
Payer: COMMERCIAL

## 2019-10-15 PROCEDURE — 97112 NEUROMUSCULAR REEDUCATION: CPT | Mod: GP | Performed by: PHYSICAL THERAPIST

## 2019-10-17 DIAGNOSIS — C50.912 MALIGNANT NEOPLASM OF LEFT BREAST IN FEMALE, ESTROGEN RECEPTOR POSITIVE, UNSPECIFIED SITE OF BREAST (H): ICD-10-CM

## 2019-10-17 DIAGNOSIS — Z17.0 MALIGNANT NEOPLASM OF LEFT BREAST IN FEMALE, ESTROGEN RECEPTOR POSITIVE, UNSPECIFIED SITE OF BREAST (H): ICD-10-CM

## 2019-10-17 DIAGNOSIS — E03.9 HYPOTHYROIDISM, UNSPECIFIED TYPE: Primary | ICD-10-CM

## 2019-10-17 RX ORDER — ANASTROZOLE 1 MG/1
TABLET ORAL
Qty: 90 TABLET | Refills: 3 | Status: SHIPPED | OUTPATIENT
Start: 2019-10-17 | End: 2020-11-09

## 2019-10-18 RX ORDER — LEVOTHYROXINE SODIUM 112 UG/1
TABLET ORAL
Qty: 45 TABLET | Refills: 3 | Status: SHIPPED | OUTPATIENT
Start: 2019-10-18 | End: 2020-10-22

## 2019-10-21 NOTE — PROGRESS NOTES
[facility]   Matilde BENSONFani Olamide, APRN CNP  10/22/2019      Chief Complaint:   Annual Physical     History of Present Illness:   Nadira Perez is a 69 year old female with a history of breast cancer, osteoporosis, thyroid disease, hypertension, and hyperlipidemia who presents for a physical.  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     Corneal opacity     Hypothyroidism     Osteopenia, unspecified location     Aromatase inhibitor use     Chronic pain of right knee     DDD (degenerative disc disease), lumbar       Perineal cyst:  Her perineal cyst has improved since I first evaluated it on 10/10. It did drain with the application of hot packs, and hot baths on the second to last day of antibiotics. It has deflated, but is still palpable and somewhat tender.     Back pain:  She endorses increased back pain. She feels some of the exercises she does through the balance clinic have been exacerbating it. This is managed well with Advil.  She is  but is trying to avoid too much Advil, so also manages with Norco sparingly.    Healthcare maintenance:  She has dental cleaning visits every four months. She denies bleeding in her mouth. She got new glasses this spring and has exams annually for post-cataract surgery follow-up. She is scheduled for repeat Dexa on 11/4. She reports since chemo she has lost 2 inches of height. She also has loose stools every morning. She plans to restart Kefir yogurt to  attempt to control this. She would like her potassium and vitamin D levels checked today. She is taking vitamin D 6000 international units daily. She received her flu shot on 10/1/19.    Other concerns discussed:  1. Toenail fungus; she removed her left great toenail herself.   2. She has foot pain which is likely peripheral neuropathy and takes gabapentin for this, thinks this issue may be worsening her balance (in addition to her vestibular balance issue). She is still falling 1-2 times per month, does not usually get injured. She is more careful now, so is falling somewhat less. She would like her handicap parking pass renewed.   3. She will need her wrist braces replaced in December, this should be worker's compensation.     Review of Systems:   Pertinent items are noted in HPI or as in patient entered ROS below, remainder of complete ROS is negative.     Active Medications:       Acetaminophen (APAP 500 PO), Take 1,000 mg by mouth 2 times daily, Disp: , Rfl:      amLODIPine (NORVASC) 10 MG tablet, Take 1 tablet (10 mg) by mouth daily, Disp: 90 tablet, Rfl: 3     anastrozole (ARIMIDEX) 1 MG tablet, TAKE 1 TABLET BY MOUTH EVERY DAY, Disp: 90 tablet, Rfl: 3     atorvastatin (LIPITOR) 40 MG tablet, Take 1 tablet (40 mg) by mouth daily Please put on profile- pt just received 90-day supply of cholesterol meds, Disp: 90 tablet, Rfl: 3     cholecalciferol 2000 units CAPS, Take 6,000 Units by mouth daily, Disp: 270 capsule, Rfl: 3     Coenzyme Q10 (COQ10) 200 MG CAPS, Take 1 capsule by mouth daily, Disp: , Rfl:      gabapentin (NEURONTIN) 300 MG capsule, Take 1-2 capsules by mouth every 8 hours, Disp: 540 capsule, Rfl: 3     glucosamine-chondroitin 500-400 MG CAPS per capsule, Take 1 capsule by mouth 2 times daily , Disp: , Rfl:      HYDROcodone-acetaminophen (NORCO) 5-325 MG per tablet, Take 1 tablet by mouth every 6 hours as needed, Disp: 40 tablet, Rfl: 0     ibuprofen (ADVIL/MOTRIN) 600 MG tablet, Take 600 mg by  mouth every 6 hours as needed for moderate pain, Disp: , Rfl:      ketoconazole (NIZORAL) 2 % shampoo, Apply topically daily as needed for itching or irritation, Disp: 120 mL, Rfl: 11     levothyroxine (SYNTHROID/LEVOTHROID) 112 MCG tablet, TAKE 1/2 TABLET BY MOUTH DAILY, Disp: 45 tablet, Rfl: 3     lisinopril (PRINIVIL/ZESTRIL) 40 MG tablet, Take 1 tablet (40 mg) by mouth daily, Disp: 90 tablet, Rfl: 2     LORazepam (ATIVAN) 2 MG tablet, Take 1 tablet at night for sleep, Disp: 30 tablet, Rfl: 1     metoprolol succinate ER (TOPROL-XL) 50 MG 24 hr tablet, Take 1 tablet (50 mg) by mouth daily, Disp: 90 tablet, Rfl: 1     Multiple Vitamin (MULTI-VITAMIN) per tablet, Take 1 tablet by mouth daily., Disp: , Rfl:      Omega-3 Fatty Acids (OMEGA-3 FISH OIL PO), Take 1 capsule by mouth daily , Disp: , Rfl:      sulfamethoxazole-trimethoprim (BACTRIM DS/SEPTRA DS) 800-160 MG tablet, Take 1 tablet by mouth 2 times daily, Disp: 14 tablet, Rfl: 0     terbinafine (LAMISIL AT) 1 % cream, Apply topically 2 times daily For fungal infection not resolved with other antifungals (e.g. Clotrimazole), Disp: 24 g, Rfl: 11     triamterene-hydrochlorothiazide (MAXZIDE-25) 37.5-25 MG per tablet, Take 1 tablet by mouth daily, Disp: 90 tablet, Rfl: 3     Vaginal Lubricant (REPHRESH) GEL, Place 2 g vaginally every 3 days, Disp: 14 Box, Rfl: 3     VOLTAREN 1 % GEL topical gel, Apply 4 grams to knees or 2 grams to hands four times daily using enclosed dosing card., Disp: 100 g, Rfl: 1     Allergies:   Penicillins      Past Medical History:  Arthritis  Bone disease  Breast cancer  Hearing problem   Kidney stones  Hyperlipidemia  Hypertension  Hypopotassemia  Lymphedema  Medullary sponge kidney  Osteopenia  Reduced vision  Squamous cell sin cancer  Thyroid disease  Murmurs  Nephrolithiasis  Osteoarthrosis  Inflamed seborrheic keratosis  Osteoporosis  Hypovitaminosis D  Contact dermatitis  Anterior basement membrane dystrophy, both eyes  Corneal  "opacity  Chronic pain of right knee     Past Surgical History:  Abdomen surgery (ovarian cyst, mesh)  Arthrodesis, wrist, left - 02/14/2013  Vaginal skin biopsy  Cataract IOL - 03/13/2018 (right), 02/20/2018 (left)  Colonoscopy - 12/24/2013  Cosmetic surgery  EGD - 11/23/2016  Right external ear surgery  Radial keratotomy   Graft bone from iliac crest - 02/14/2013  Umbilical hernia repair - 1954  Total hysterectomy - 05/03/2000  Mastectomy, bilateral   Parathyroidectomy - 09/23/2004  Rhinoplasty - 12/31/1985  Tonsillectomy - 03/01/1968  Remove hardware hand - 09/24/2013     Family History:   Heart disease - father (AAA)  Hypertension - father  Neurologic disorder - mother (aneurysm of cerebral artery, dementia)  Diabetes - mother, maternal grandmother, maternal aunts (x2)  Thyroid disease - mother  Cerebrovascular disease - mother  Osteoporosis - mother  Melanoma - maternal aunt  Glaucoma - maternal aunt  Peripheral neuropathy - brother  Obesity - other  Chronic obstructive pulmonary disease (COPD) - maternal grandfather, father  Asthma - maternal grandfather   Breast cancer - cousin     Social History:   The patient is , a nonsmoker, and does occasionally consume alcohol.       Physical Exam:   /77 (BP Location: Right arm, Patient Position: Chair, Cuff Size: Adult Regular)   Pulse 76   Temp 98.7  F (37.1  C) (Oral)   Resp 20   Ht 1.702 m (5' 7\")   Wt 81.6 kg (180 lb)   SpO2 97%   Breastfeeding? No   BMI 28.19 kg/m     Wt Readings from Last 1 Encounters:   10/22/19 81.6 kg (180 lb)     Constitutional: no distress, comfortable, pleasant   Eyes: anicteric, conjunctiva pink, glasses.  Ears, Nose and Throat: tympanic membranes clear,  throat clear.   Neck: supple with full range of motion, no thyromegaly.   Cardiovascular: regular rate and rhythm, normal S1 and S2, no murmurs, rubs or gallops, peripheral pulses full and symmetric   Respiratory: clear to auscultation, no wheezes or crackles, normal " breath sounds   Gastrointestinal: positive bowel sounds, nontender, no hepatosplenomegaly, no masses   Musculoskeletal: full range of motion, no edema   Skin: no concerning lesions, no jaundice, temp normal . Her right perineal cyst is deflated but still palpable.  Neurological:normal gait, normal speech, no tremor. A and O x 3, good historian.  Psychological: appropriate mood, good eye contact, normal affect   Lymph: no  cervical,  supraclavicular, infraclavicular nodes.       Assessment and Plan:  Benign essential hypertension  Well-controlled, refill provided.  - UA with Micro reflex to Culture  - lisinopril (PRINIVIL/ZESTRIL) 40 MG tablet  Dispense: 90 tablet; Refill: 2  - metoprolol succinate ER (TOPROL-XL) 50 MG 24 hr tablet  Dispense: 90 tablet; Refill: 1  - triamterene-HCTZ (MAXZIDE-25) 37.5-25 MG tablet  Dispense: 90 tablet; Refill: 3    Hypothyroidism, unspecified type  Due for TSH recheck.  - TSH    Elevated hemoglobin A1c  Recheck per patient request. Her last A1c was 6.1% on 5/13.  - Hemoglobin A1c    Pure hypercholesterolemia  Due for fasting lipid panel, will have labs done later this month with oncology labs. Refill provided.  - Lipid Profile FASTING  - atorvastatin (LIPITOR) 40 MG tablet  Dispense: 90 tablet; Refill: 3    Vitamin D deficiency  Will recheck vitamin D level; she is taking 6000 international units per day.  - Vitamin D Deficiency    DDD (degenerative disc disease), lumbar  Refill provided. She uses these sparingly.  - HYDROcodone-acetaminophen (NORCO) 5-325 MG tablet  Dispense: 40 tablet; Refill: 0    Neuropathic pain  Refill provided. She feels this pain has contributed to her worsened balance, which causes her to fall 1-2 times per month.  - gabapentin (NEURONTIN) 300 MG capsule  Dispense: 540 capsule; Refill: 3    Right knee pain, unspecified chronicity  Refill provided.  - VOLTAREN 1 % topical gel  Dispense: 100 g; Refill: 1    Tinea pedis of both feet  Refill provided.  -  terbinafine (LAMISIL AT) 1 % external cream  Dispense: 24 g; Refill: 11    Peroneal cyst  Her cyst has drained but is . Recommended she continue using hot packs on the area. Informed her that we could open it up and remove it completely, but would not advise doing so at this time. She can let me know if it does not continue to improve.     Total time spent 40 minutes.  More than 50% of the time spent with Ms. Perez on counseling / coordinating her care.    Matlide LONG CNP      Scribe Disclosure:  I, Suzi Epps, am serving as a scribe to document services personally performed by MERCEDES Kyle CNP at this visit, based upon the provider's statements to me. All documentation has been reviewed by the aforementioned provider prior to being entered into the official medical record.    Portions of this medical record were completed by a scribe. UPON MY REVIEW AND AUTHENTICATION BY ELECTRONIC SIGNATURE, this confirms (a) I performed the applicable clinical services, and (b) the record is accurate.

## 2019-10-22 ENCOUNTER — OFFICE VISIT (OUTPATIENT)
Dept: INTERNAL MEDICINE | Facility: CLINIC | Age: 69
End: 2019-10-22
Payer: COMMERCIAL

## 2019-10-22 ENCOUNTER — OFFICE VISIT (OUTPATIENT)
Dept: DERMATOLOGY | Facility: CLINIC | Age: 69
End: 2019-10-22
Payer: COMMERCIAL

## 2019-10-22 VITALS
RESPIRATION RATE: 20 BRPM | HEIGHT: 67 IN | WEIGHT: 180 LBS | TEMPERATURE: 98.7 F | DIASTOLIC BLOOD PRESSURE: 77 MMHG | BODY MASS INDEX: 28.25 KG/M2 | OXYGEN SATURATION: 97 % | HEART RATE: 76 BPM | SYSTOLIC BLOOD PRESSURE: 139 MMHG

## 2019-10-22 DIAGNOSIS — M79.2 NEUROPATHIC PAIN: ICD-10-CM

## 2019-10-22 DIAGNOSIS — M25.561 RIGHT KNEE PAIN, UNSPECIFIED CHRONICITY: ICD-10-CM

## 2019-10-22 DIAGNOSIS — R73.09 ELEVATED HEMOGLOBIN A1C: ICD-10-CM

## 2019-10-22 DIAGNOSIS — I10 BENIGN ESSENTIAL HYPERTENSION: Primary | ICD-10-CM

## 2019-10-22 DIAGNOSIS — E78.00 PURE HYPERCHOLESTEROLEMIA: ICD-10-CM

## 2019-10-22 DIAGNOSIS — M51.369 DDD (DEGENERATIVE DISC DISEASE), LUMBAR: ICD-10-CM

## 2019-10-22 DIAGNOSIS — I10 BENIGN ESSENTIAL HYPERTENSION: ICD-10-CM

## 2019-10-22 DIAGNOSIS — E55.9 VITAMIN D DEFICIENCY: ICD-10-CM

## 2019-10-22 DIAGNOSIS — F11.21: ICD-10-CM

## 2019-10-22 DIAGNOSIS — L72.0 EIC (EPIDERMAL INCLUSION CYST): ICD-10-CM

## 2019-10-22 DIAGNOSIS — I10 ESSENTIAL HYPERTENSION, BENIGN: ICD-10-CM

## 2019-10-22 DIAGNOSIS — L21.9 DERMATITIS, SEBORRHEIC: ICD-10-CM

## 2019-10-22 DIAGNOSIS — B35.3 TINEA PEDIS OF BOTH FEET: ICD-10-CM

## 2019-10-22 DIAGNOSIS — L30.9 ECZEMA, UNSPECIFIED TYPE: ICD-10-CM

## 2019-10-22 DIAGNOSIS — Z85.828 HISTORY OF NONMELANOMA SKIN CANCER: ICD-10-CM

## 2019-10-22 DIAGNOSIS — L82.1 SK (SEBORRHEIC KERATOSIS): Primary | ICD-10-CM

## 2019-10-22 DIAGNOSIS — L82.0 INFLAMED SEBORRHEIC KERATOSIS: ICD-10-CM

## 2019-10-22 DIAGNOSIS — Z13.1 SCREENING FOR DIABETES MELLITUS: ICD-10-CM

## 2019-10-22 DIAGNOSIS — E03.9 HYPOTHYROIDISM, UNSPECIFIED TYPE: ICD-10-CM

## 2019-10-22 LAB
ALBUMIN UR-MCNC: NEGATIVE MG/DL
APPEARANCE UR: CLEAR
BILIRUB UR QL STRIP: NEGATIVE
COLOR UR AUTO: YELLOW
GLUCOSE UR STRIP-MCNC: NEGATIVE MG/DL
HGB UR QL STRIP: NEGATIVE
HYALINE CASTS #/AREA URNS LPF: 10 /LPF (ref 0–2)
KETONES UR STRIP-MCNC: NEGATIVE MG/DL
LEUKOCYTE ESTERASE UR QL STRIP: NEGATIVE
MUCOUS THREADS #/AREA URNS LPF: PRESENT /LPF
NITRATE UR QL: NEGATIVE
PH UR STRIP: 6 PH (ref 5–7)
RBC #/AREA URNS AUTO: 1 /HPF (ref 0–2)
SOURCE: ABNORMAL
SP GR UR STRIP: 1.01 (ref 1–1.03)
SQUAMOUS #/AREA URNS AUTO: <1 /HPF (ref 0–1)
UROBILINOGEN UR STRIP-MCNC: 0 MG/DL (ref 0–2)
WBC #/AREA URNS AUTO: <1 /HPF (ref 0–5)

## 2019-10-22 RX ORDER — LISINOPRIL 40 MG/1
40 TABLET ORAL DAILY
Qty: 90 TABLET | Refills: 2 | Status: SHIPPED | OUTPATIENT
Start: 2019-10-22 | End: 2020-07-27

## 2019-10-22 RX ORDER — METOPROLOL SUCCINATE 50 MG/1
50 TABLET, EXTENDED RELEASE ORAL DAILY
Qty: 90 TABLET | Refills: 1 | Status: SHIPPED | OUTPATIENT
Start: 2019-10-22 | End: 2020-05-01

## 2019-10-22 RX ORDER — KETOCONAZOLE 20 MG/ML
SHAMPOO TOPICAL DAILY PRN
Qty: 120 ML | Refills: 11 | Status: ON HOLD | OUTPATIENT
Start: 2019-10-22 | End: 2020-08-10

## 2019-10-22 RX ORDER — HYDROCODONE BITARTRATE AND ACETAMINOPHEN 5; 325 MG/1; MG/1
1 TABLET ORAL EVERY 6 HOURS PRN
Qty: 40 TABLET | Refills: 0 | Status: SHIPPED | OUTPATIENT
Start: 2019-10-22 | End: 2020-07-07

## 2019-10-22 RX ORDER — TRIAMCINOLONE ACETONIDE 1 MG/G
OINTMENT TOPICAL 2 TIMES DAILY
Qty: 30 G | Refills: 11 | Status: SHIPPED | OUTPATIENT
Start: 2019-10-22 | End: 2020-10-22

## 2019-10-22 RX ORDER — GABAPENTIN 300 MG/1
CAPSULE ORAL
Qty: 540 CAPSULE | Refills: 3 | Status: SHIPPED | OUTPATIENT
Start: 2019-10-22 | End: 2020-10-22

## 2019-10-22 RX ORDER — PRENATAL VIT 91/IRON/FOLIC/DHA 28-975-200
COMBINATION PACKAGE (EA) ORAL 2 TIMES DAILY
Qty: 24 G | Refills: 11 | Status: SHIPPED | OUTPATIENT
Start: 2019-10-22 | End: 2021-10-31

## 2019-10-22 RX ORDER — TRIAMTERENE/HYDROCHLOROTHIAZID 37.5-25 MG
1 TABLET ORAL DAILY
Qty: 90 TABLET | Refills: 3 | Status: SHIPPED | OUTPATIENT
Start: 2019-10-22 | End: 2020-10-22

## 2019-10-22 RX ORDER — ATORVASTATIN CALCIUM 40 MG/1
40 TABLET, FILM COATED ORAL DAILY
Qty: 90 TABLET | Refills: 3 | Status: SHIPPED | OUTPATIENT
Start: 2019-10-22 | End: 2020-09-05

## 2019-10-22 ASSESSMENT — ANXIETY QUESTIONNAIRES
GAD7 TOTAL SCORE: 0
7. FEELING AFRAID AS IF SOMETHING AWFUL MIGHT HAPPEN: NOT AT ALL
IF YOU CHECKED OFF ANY PROBLEMS ON THIS QUESTIONNAIRE, HOW DIFFICULT HAVE THESE PROBLEMS MADE IT FOR YOU TO DO YOUR WORK, TAKE CARE OF THINGS AT HOME, OR GET ALONG WITH OTHER PEOPLE: NOT DIFFICULT AT ALL
5. BEING SO RESTLESS THAT IT IS HARD TO SIT STILL: NOT AT ALL
3. WORRYING TOO MUCH ABOUT DIFFERENT THINGS: NOT AT ALL
6. BECOMING EASILY ANNOYED OR IRRITABLE: NOT AT ALL
2. NOT BEING ABLE TO STOP OR CONTROL WORRYING: NOT AT ALL
1. FEELING NERVOUS, ANXIOUS, OR ON EDGE: NOT AT ALL

## 2019-10-22 ASSESSMENT — PATIENT HEALTH QUESTIONNAIRE - PHQ9
SUM OF ALL RESPONSES TO PHQ QUESTIONS 1-9: 3
5. POOR APPETITE OR OVEREATING: NOT AT ALL

## 2019-10-22 ASSESSMENT — PAIN SCALES - GENERAL
PAINLEVEL: NO PAIN (0)
PAINLEVEL: MILD PAIN (3)

## 2019-10-22 ASSESSMENT — MIFFLIN-ST. JEOR: SCORE: 1384.1

## 2019-10-22 NOTE — NURSING NOTE
Chief Complaint   Patient presents with     Physical     labs a1c labs, potassium, vitamin D       Karley Orellana, EMT

## 2019-10-22 NOTE — PATIENT INSTRUCTIONS
Cryotherapy    What is it?    Use of a very cold liquid, such as liquid nitrogen, to freeze and destroy abnormal skin cells that need to be removed    What should I expect?    Tenderness and redness    A small blister that might grow and fill with dark purple blood. There may be crusting.    More than one treatment may be needed if the lesions do not go away.    How do I care for the treated area?    Gently wash the area with your hands when bathing.    Use a thin layer of Vaseline to help with healing. You may use a Band-Aid.     The area should heal within 7-10 days and may leave behind a pink or lighter color.     Do not use an antibiotic or Neosporin ointment.     You may take acetaminophen (Tylenol) for pain.     Call your Doctor if you have:    Severe pain    Signs of infection (warmth, redness, cloudy yellow drainage, and or a bad smell)    Questions or concerns    Who should I call with questions?       [facility]: [phone]       [facility]: [phone]       For urgent needs outside of business hours call the [facility] [[email or web address]] at [phone]        and ask for the dermatology resident on call

## 2019-10-22 NOTE — PROGRESS NOTES
[facility] Dermatology Note      Dermatology Problem List:  1. History of SCC of the perineum, tx w/ excision and radiation  - Last TBSE 10/22/19   2. SK's. Multiple over chest back and face, and legs  3. Tinea pedis w/ onychomycosis              - Terbinafine cream  4. Seborrheic dermatitis              - 2% ketoconazole shampoo, alternate with Head and Shoulders  4. Hand dermatitis: Triamcinolone 0.1% cream PRN    Encounter Date: Oct 22, 2019    CC:  Chief Complaint   Patient presents with     Skin Check     Nadira is here today for a skin check. Patient notes suspicious spots on her trunk, abdomen, back and groin. Nadira notes they are irritated.          History of Present Illness:  Ms. Nadira Perez is a 69 year old female who presents for a skin check. The patient reports that she has a few suspicious brown lesions on trunk and groin that she would like evaluated. She states that the lesions developed relatively quickly and a few of them are particularly itchy and bothersome on her back, abdomen, and groin. There is also an irritating protruding lesion on her right chest that she would like evaluated. She would like the symptomatic lesions treated if possible. She notes that she recently had a cyst in her groin area that was evaluated by an outside provider. She was told it was benign, but she states that it has been intermittently draining. The area was quite tender and painful at one point, but it has since been resolving and feels much better with the use of warm compression. The patient voices no other concerns.    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     Corneal opacity     Hypothyroidism     Osteopenia, unspecified location     Aromatase inhibitor use     Chronic pain of right knee     DDD (degenerative disc disease), lumbar     Past Medical History:   Diagnosis Date     Arthritis      Bone disease      Breast cancer (H)      H/O kyphoplasty      Hearing problem      History of kidney stones      History of radiation therapy      Hyperlipemia      Hypertension      Hypopotassemia      Kidney problem      Lymph edema      Medullary sponge kidney      Osteopenia      PONV (postoperative nausea and vomiting)      Reduced vision      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     Thyroid disease      Past Surgical History:   Procedure Laterality Date     ABDOMEN SURGERY      ovarian cyst, mesh     ARTHRODESIS WRIST       ARTHRODESIS WRIST  2/14/2013    Procedure: ARTHRODESIS WRIST;  left wrist scaphoid excision, four bone fusion, iliac crest bone graft  ( Mac with block);  Surgeon: Av Mendez MD;  [facility]: [facility]     BIOPSY      skin, vaginal     BIOPSY OF SKIN LESION       CATARACT IOL, RT/LT Right 03/13/2018     CATARACT IOL, RT/LT Left 02/20/2018     COLONOSCOPY  12/24/2013    Procedure: COMBINED COLONOSCOPY, SINGLE BIOPSY/POLYPECTOMY BY BIOPSY;  COLONOSCOPY;  Surgeon: Dom Alvarez MD;  Location:  GI     COSMETIC SURGERY       ESOPHAGOSCOPY, GASTROSCOPY, DUODENOSCOPY (EGD), COMBINED N/A 11/23/2016    Procedure: COMBINED ESOPHAGOSCOPY, GASTROSCOPY, DUODENOSCOPY (EGD);  Surgeon: Quinten Feliciano MD;  Location:  GI     EXTERNAL EAR SURGERY      right     EYE SURGERY      radial keratomy     GRAFT BONE FROM ILIAC CREST  2/14/2013    Procedure: GRAFT BONE FROM ILIAC CREST;  mac with block and local infilitration;  Surgeon: Vanessa  Av Ruiz MD;  [facility]: [facility]      BREATH HYDROGEN TEST N/A 10/14/2016    Procedure: HYDROGEN BREATH TEST;  Surgeon: Cheri Barron MD;  Location:  GI     HERNIA REPAIR      UMBILICAL     HERNIA REPAIR      umbilical age 18 mos.     HERNIA REPAIR       HERNIORRHAPHY UMBILICAL  1/31/1954     HYSTERECTOMY TOTAL ABDOMINAL  5/3/00     HYSTERECTOMY TOTAL ABDOMINAL  5/3/2000     MASTECTOMY      PROPHYLACTIC RIGHT BREAST     MASTECTOMY MODIFIED RADICAL      LEFT BREAST     MASTECTOMY MODIFIED RADICAL      bilateral; right breast prophylactic     PARATHYROIDECTOMY  9/23/04    R SUPERIOR     PARATHYROIDECTOMY  9/23/2004    parathyroid resection, subtotal     REMOVE HARDWARE HAND  9/24/2013    Procedure: REMOVE HARDWARE HAND;  Left Hand Screw Removal        RHINOPLASTY  12/31/1985     RHINOPLASTY  12/31/1985     thyr proc skin closed cosmetic manner by subcuticular stitch  1/23/2009     TONSILLECTOMY  3/1/68     TONSILLECTOMY  3/1/1968     WRIST SURGERY      wrist arthrodesis       Social History:  Patient reports that she has never smoked. She has never used smokeless tobacco. She reports current alcohol use. She reports that she does not use drugs.    Family History:  Family History   Problem Relation Age of Onset     Heart Disease Father         AAA     Hypertension Father      Neurologic Disorder Mother         Anuerysm of Cerebral Artery, Dementia     Diabetes Mother      Thyroid Disease Mother         ,     Cerebrovascular Disease Mother      Dementia Mother      Osteoporosis Mother      Diabetes Maternal Grandmother      Asthma Maternal Grandmother      Diabetes Maternal Aunt         x2     Melanoma Maternal Aunt      Glaucoma Maternal Aunt      Circulatory Brother         Perihperal Neurophathy     Dementia Other      Cancer Other         malignant melanoma     Hypertension Other      Hypertension Other      Cerebrovascular Disease Other      Cerebrovascular Disease Other      Obesity Other       Respiratory Other      Chronic Obstructive Pulmonary Disease Maternal Grandfather         father     Asthma Maternal Grandfather      Breast Cancer Cousin      Cancer Other      Diabetes Other      Asthma Other      Macular Degeneration No family hx of      Coronary Artery Disease No family hx of      Hyperlipidemia No family hx of      Kidney Disease No family hx of      Thrombosis No family hx of      Arthritis No family hx of      Depression No family hx of      Mental Illness No family hx of      Substance Abuse No family hx of      Cystic Fibrosis No family hx of      Early Death No family hx of      Coronary Artery Disease Early Onset No family hx of      Heart Failure No family hx of      Bleeding Diathesis No family hx of      Ovarian Cancer No family hx of      Uterine Cancer No family hx of      Prostate Cancer No family hx of      Colorectal Cancer No family hx of      Pancreatic Cancer No family hx of      Lung Cancer No family hx of      Other Cancer No family hx of      Autoimmune Disease No family hx of      Unknown/Adopted No family hx of      Genetic Disorder No family hx of        Medications:  Current Outpatient Medications   Medication Sig Dispense Refill     Acetaminophen (APAP 500 PO) Take 1,000 mg by mouth 2 times daily       amLODIPine (NORVASC) 10 MG tablet Take 1 tablet (10 mg) by mouth daily 90 tablet 3     anastrozole (ARIMIDEX) 1 MG tablet TAKE 1 TABLET BY MOUTH EVERY DAY 90 tablet 3     atorvastatin (LIPITOR) 40 MG tablet Take 1 tablet (40 mg) by mouth daily Please put on profile- pt just received 90-day supply of cholesterol meds 90 tablet 3     cholecalciferol 2000 units CAPS Take 6,000 Units by mouth daily 270 capsule 3     Coenzyme Q10 (COQ10) 200 MG CAPS Take 1 capsule by mouth daily       gabapentin (NEURONTIN) 300 MG capsule Take 1-2 capsules by mouth every 8 hours 540 capsule 3     glucosamine-chondroitin 500-400 MG CAPS per capsule Take 1 capsule by mouth 2 times daily         HYDROcodone-acetaminophen (NORCO) 5-325 MG tablet Take 1 tablet by mouth every 6 hours as needed 40 tablet 0     ibuprofen (ADVIL/MOTRIN) 600 MG tablet Take 600 mg by mouth every 6 hours as needed for moderate pain       ketoconazole (NIZORAL) 2 % shampoo Apply topically daily as needed for itching or irritation 120 mL 11     levothyroxine (SYNTHROID/LEVOTHROID) 112 MCG tablet TAKE 1/2 TABLET BY MOUTH DAILY 45 tablet 3     lisinopril (PRINIVIL/ZESTRIL) 40 MG tablet Take 1 tablet (40 mg) by mouth daily 90 tablet 2     LORazepam (ATIVAN) 2 MG tablet Take 1 tablet at night for sleep 30 tablet 1     metoprolol succinate ER (TOPROL-XL) 50 MG 24 hr tablet Take 1 tablet (50 mg) by mouth daily 90 tablet 1     Multiple Vitamin (MULTI-VITAMIN) per tablet Take 1 tablet by mouth daily.       Omega-3 Fatty Acids (OMEGA-3 FISH OIL PO) Take 1 capsule by mouth daily        order for DME Equipment being ordered: sleeve/gauntlet/glove 20-40 mmHg, lymphedema bandaging supplies 1 each 12     order for DME Equipment being ordered: prosthetic breasts 2 each 12     sulfamethoxazole-trimethoprim (BACTRIM DS/SEPTRA DS) 800-160 MG tablet Take 1 tablet by mouth 2 times daily 14 tablet 0     terbinafine (LAMISIL AT) 1 % external cream Apply topically 2 times daily For fungal infection not resolved with other antifungals (e.g. Clotrimazole) 24 g 11     triamcinolone (KENALOG) 0.1 % ointment Apply topically 2 times daily 30 g 11     triamterene-HCTZ (MAXZIDE-25) 37.5-25 MG tablet Take 1 tablet by mouth daily 90 tablet 3     Vaginal Lubricant (REPHRESH) GEL Place 2 g vaginally every 3 days 14 Box 3     VOLTAREN 1 % topical gel Apply 4 grams to knees or 2 grams to hands four times daily using enclosed dosing card. 100 g 1       Allergies   Allergen Reactions     Penicillins Hives       Review of Systems:  -Constitutional: Otherwise feeling well today, in usual state of health.  -HEENT: Patient denies nonhealing oral sores.  -Skin: As above in  HPI. No additional skin concerns.    Physical exam:  Vitals: There were no vitals taken for this visit.  GEN: This is a well developed, well-nourished female in no acute distress, in a pleasant mood.    SKIN: Full skin, which includes the head/face, both arms, chest, back, abdomen,both legs, genitalia and/or groin buttocks, digits and/or nails, was examined.  -[place] skin type: II  -violaceous papular nodule with a superficial white cystic papule on the R inner thigh near the labia  -There is(are) skin colored pedunculated papules with erythema on the R chest.   -There are waxy stuck on tan to brown papules on the trunk and extremities.  -There are tan to brown waxy stuck on papules with surrounding erythema on the trunk x6 .   -No other lesions of concern on areas examined.       Impression/Plan:    1. Irritated acrochordon on the R chest     Cryotherapy procedure note: After verbal consent and discussion of risks and benefits including but no limited to dyspigmentation/scar, blister, and pain, 1 was(were) treated with 1-2mm freeze border for 2 cycles with liquid nitrogen. Post cryotherapy instructions were provided.     2. Inflamed seborrheic keratoses    Cryotherapy procedure note: After verbal consent and discussion of risks and benefits including but no limited to dyspigmentation/scar, blister, and pain, 6 was(were) treated with 1-2mm freeze border for 2 cycles with liquid nitrogen. Post cryotherapy instructions were provided.    3. Cyst on the R inner thigh    Discussed the natural etiology and provided reassurances about the benign nature of the lesion.      4. Seborrheic dermatitis: not active on exam today    Refill provided of ketoconazole 2% shampoo    5. History of hand dermatitis: not active on exam today    Refill provided of triamcinolone 0.1% ointment.    6. History of nonmelanoma skin cancer (NMSC)  - no evidence of recurrent disease via inspection or palpation; all sites well-healed  -  photoprotection discussed (SPF30+ sunscreen and proper use, UPF clothing, sun avoidance, tanning bed avoidance)    - continued skin surveillance recommended      7. Seborrheic keratosis, non irritated    Discussed the natural etiology and provided reassurances about the benign nature of the lesions.     Follow-up in 1 year, earlier for new or changing lesions.       Staff Involved:  Scribe/Staff    Scribe Disclosure  I, Dominic Najjar, am serving as a scribe to document services personally performed by Dr. Abe Wilkerson MD, based on data collection and the provider's statements to me.     Staff attestation:  The documentation recorded by the scribe accurately reflects the services I personally performed and the decisions I personally made. I have made edits where needed.    Abe Wilkerson MD  Staff Dermatologist and Dermatopathologist  , Department of Dermatology

## 2019-10-22 NOTE — NURSING NOTE
Chief Complaint   Patient presents with     Skin Check     Nadira is here today for a skin check. Patient notes suspicious spots on her trunk, abdomen, back and groin. Nadira notes they are irritated.      Sheila Leos LPN

## 2019-10-22 NOTE — LETTER
10/22/2019       RE: Nadira Perez  [id number]  [facility] [address]     Dear Colleague,    Thank you for referring your patient, Nadira Perez, to the [facility] DERMATOLOGY at [facility]. Please see a copy of my visit note below.    [facility] Dermatology Note      Dermatology Problem List:  1. History of SCC of the perineum, tx w/ excision and radiation  - Last TBSE  10/22/19   2. SK's. Multiple over chest back and face, and legs  3. Tinea pedis w/ onychomycosis              - Terbinafine cream  4. Seborrheic dermatitis              - 2% ketoconazole shampoo, alternate with Head and Shoulders  4. Hand dermatitis: Triamcinolone 0.1% cream PRN    Encounter Date: Oct 22, 2019    CC:  Chief Complaint   Patient presents with     Skin Check     Nadira is here today for a skin check. Patient notes suspicious spots on her trunk, abdomen, back and groin. Nadira notes they are irritated.          History of Present Illness:  Ms. Nadira Perez is a 69 year old female who presents for a skin check. The patient reports that she has a few suspicious brown lesions on trunk and groin that she would like evaluated. She states that the lesions developed relatively quickly and a few of them are particularly itchy and bothersome on her back, abdomen, and groin. There is also an irritating protruding lesion on her right chest that she would like evaluated. She would like the symptomatic lesions treated if possible. She notes that she recently had a cyst in her groin area that was evaluated by an outside provider. She was told it was benign, but she states that it has been intermittently draining. The area was quite tender and painful at one point, but it has since been resolving and feels much better with the use of warm compression. The patient voices no other concerns.    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     Corneal opacity     Hypothyroidism     Osteopenia, unspecified location     Aromatase inhibitor use     Chronic pain of right knee     DDD (degenerative disc disease), lumbar     Past Medical History:   Diagnosis Date     Arthritis      Bone disease      Breast cancer (H)      H/O kyphoplasty      Hearing problem      History of kidney stones      History of radiation therapy      Hyperlipemia      Hypertension      Hypopotassemia      Kidney problem      Lymph edema      Medullary sponge kidney      Osteopenia      PONV (postoperative nausea and vomiting)      Reduced vision      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     Thyroid disease      Past Surgical History:   Procedure Laterality Date     ABDOMEN SURGERY      ovarian cyst, mesh     ARTHRODESIS WRIST       ARTHRODESIS WRIST  2/14/2013    Procedure: ARTHRODESIS WRIST;  left wrist scaphoid excision, four bone fusion, iliac crest bone graft  ( Mac with block);  Surgeon: Av Mendez MD;  [facility]     BIOPSY      skin, vaginal     BIOPSY OF SKIN LESION       CATARACT IOL, RT/LT Right 03/13/2018     CATARACT IOL, RT/LT Left 02/20/2018     COLONOSCOPY  12/24/2013    Procedure: COMBINED COLONOSCOPY, SINGLE BIOPSY/POLYPECTOMY BY BIOPSY;  COLONOSCOPY;  Surgeon: Dom Alvarez MD;  Location:  GI     COSMETIC SURGERY       ESOPHAGOSCOPY, GASTROSCOPY, DUODENOSCOPY (EGD), COMBINED N/A 11/23/2016    Procedure: COMBINED ESOPHAGOSCOPY,  GASTROSCOPY, DUODENOSCOPY (EGD);  Surgeon: Quinten Feliciano MD;  Location:  GI     EXTERNAL EAR SURGERY      right     EYE SURGERY      radial keratomy     GRAFT BONE FROM ILIAC CREST  2/14/2013    Procedure: GRAFT BONE FROM ILIAC CREST;  mac with block and local infilitration;  Surgeon: Av Mendez MD;  [facility]: [facility]      BREATH HYDROGEN TEST N/A 10/14/2016    Procedure: HYDROGEN BREATH TEST;  Surgeon: Cheri Barron MD;  Location:  GI     HERNIA REPAIR      UMBILICAL     HERNIA REPAIR      umbilical age 18 mos.     HERNIA REPAIR       HERNIORRHAPHY UMBILICAL  1/31/1954     HYSTERECTOMY TOTAL ABDOMINAL  5/3/00     HYSTERECTOMY TOTAL ABDOMINAL  5/3/2000     MASTECTOMY      PROPHYLACTIC RIGHT BREAST     MASTECTOMY MODIFIED RADICAL      LEFT BREAST     MASTECTOMY MODIFIED RADICAL      bilateral; right breast prophylactic     PARATHYROIDECTOMY  9/23/04    R SUPERIOR     PARATHYROIDECTOMY  9/23/2004    parathyroid resection, subtotal     REMOVE HARDWARE HAND  9/24/2013    Procedure: REMOVE HARDWARE HAND;  Left Hand Screw Removal        RHINOPLASTY  12/31/1985     RHINOPLASTY  12/31/1985     thyr proc skin closed cosmetic manner by subcuticular stitch  1/23/2009     TONSILLECTOMY  3/1/68     TONSILLECTOMY  3/1/1968     WRIST SURGERY      wrist arthrodesis       Social History:  Patient reports that she has never smoked. She has never used smokeless tobacco. She reports current alcohol use. She reports that she does not use drugs.    Family History:  Family History   Problem Relation Age of Onset     Heart Disease Father         AAA     Hypertension Father      Neurologic Disorder Mother         Anuerysm of Cerebral Artery, Dementia     Diabetes Mother      Thyroid Disease Mother         ,     Cerebrovascular Disease Mother      Dementia Mother      Osteoporosis Mother      Diabetes Maternal Grandmother      Asthma Maternal Grandmother      Diabetes Maternal Aunt         x2     Melanoma  Maternal Aunt      Glaucoma Maternal Aunt      Circulatory Brother         Perihperal Neurophathy     Dementia Other      Cancer Other         malignant melanoma     Hypertension Other      Hypertension Other      Cerebrovascular Disease Other      Cerebrovascular Disease Other      Obesity Other      Respiratory Other      Chronic Obstructive Pulmonary Disease Maternal Grandfather         father     Asthma Maternal Grandfather      Breast Cancer Cousin      Cancer Other      Diabetes Other      Asthma Other      Macular Degeneration No family hx of      Coronary Artery Disease No family hx of      Hyperlipidemia No family hx of      Kidney Disease No family hx of      Thrombosis No family hx of      Arthritis No family hx of      Depression No family hx of      Mental Illness No family hx of      Substance Abuse No family hx of      Cystic Fibrosis No family hx of      Early Death No family hx of      Coronary Artery Disease Early Onset No family hx of      Heart Failure No family hx of      Bleeding Diathesis No family hx of      Ovarian Cancer No family hx of      Uterine Cancer No family hx of      Prostate Cancer No family hx of      Colorectal Cancer No family hx of      Pancreatic Cancer No family hx of      Lung Cancer No family hx of      Other Cancer No family hx of      Autoimmune Disease No family hx of      Unknown/Adopted No family hx of      Genetic Disorder No family hx of        Medications:  Current Outpatient Medications   Medication Sig Dispense Refill     Acetaminophen (APAP 500 PO) Take 1,000 mg by mouth 2 times daily       amLODIPine (NORVASC) 10 MG tablet Take 1 tablet (10 mg) by mouth daily 90 tablet 3     anastrozole (ARIMIDEX) 1 MG tablet TAKE 1 TABLET BY MOUTH EVERY DAY 90 tablet 3     atorvastatin (LIPITOR) 40 MG tablet Take 1 tablet (40 mg) by mouth daily Please put on profile- pt just received 90-day supply of cholesterol meds 90 tablet 3     cholecalciferol 2000 units CAPS Take 6,000  Units by mouth daily 270 capsule 3     Coenzyme Q10 (COQ10) 200 MG CAPS Take 1 capsule by mouth daily       gabapentin (NEURONTIN) 300 MG capsule Take 1-2 capsules by mouth every 8 hours 540 capsule 3     glucosamine-chondroitin 500-400 MG CAPS per capsule Take 1 capsule by mouth 2 times daily        HYDROcodone-acetaminophen (NORCO) 5-325 MG tablet Take 1 tablet by mouth every 6 hours as needed 40 tablet 0     ibuprofen (ADVIL/MOTRIN) 600 MG tablet Take 600 mg by mouth every 6 hours as needed for moderate pain       ketoconazole (NIZORAL) 2 % shampoo Apply topically daily as needed for itching or irritation 120 mL 11     levothyroxine (SYNTHROID/LEVOTHROID) 112 MCG tablet TAKE 1/2 TABLET BY MOUTH DAILY 45 tablet 3     lisinopril (PRINIVIL/ZESTRIL) 40 MG tablet Take 1 tablet (40 mg) by mouth daily 90 tablet 2     LORazepam (ATIVAN) 2 MG tablet Take 1 tablet at night for sleep 30 tablet 1     metoprolol succinate ER (TOPROL-XL) 50 MG 24 hr tablet Take 1 tablet (50 mg) by mouth daily 90 tablet 1     Multiple Vitamin (MULTI-VITAMIN) per tablet Take 1 tablet by mouth daily.       Omega-3 Fatty Acids (OMEGA-3 FISH OIL PO) Take 1 capsule by mouth daily        order for DME Equipment being ordered: sleeve/gauntlet/glove 20-40 mmHg, lymphedema bandaging supplies 1 each 12     order for DME Equipment being ordered: prosthetic breasts 2 each 12     sulfamethoxazole-trimethoprim (BACTRIM DS/SEPTRA DS) 800-160 MG tablet Take 1 tablet by mouth 2 times daily 14 tablet 0     terbinafine (LAMISIL AT) 1 % external cream Apply topically 2 times daily For fungal infection not resolved with other antifungals (e.g. Clotrimazole) 24 g 11     triamcinolone (KENALOG) 0.1 % ointment Apply topically 2 times daily 30 g 11     triamterene-HCTZ (MAXZIDE-25) 37.5-25 MG tablet Take 1 tablet by mouth daily 90 tablet 3     Vaginal Lubricant (REPHRESH) GEL Place 2 g vaginally every 3 days 14 Box 3     VOLTAREN 1 % topical gel Apply 4 grams to knees  or 2 grams to hands four times daily using enclosed dosing card. 100 g 1       Allergies   Allergen Reactions     Penicillins Hives       Review of Systems:  -Constitutional: Otherwise feeling well today, in usual state of health.  -HEENT: Patient denies nonhealing oral sores.  -Skin: As above in HPI. No additional skin concerns.    Physical exam:  Vitals: There were no vitals taken for this visit.  GEN: This is a well developed, well-nourished female in no acute distress, in a pleasant mood.    SKIN: Full skin, which includes the head/face, both arms, chest, back, abdomen,both legs, genitalia and/or groin buttocks, digits and/or nails, was examined.  -[place] skin type: II  -violaceous papular nodule with a superficial white cystic papule on the R inner thigh near the labia  -There is(are) skin colored pedunculated papules with erythema on the R chest.   -There are waxy stuck on tan to brown papules on the trunk and extremities.  -There are tan to brown waxy stuck on papules with surrounding erythema on the trunk x6 .   -No other lesions of concern on areas examined.       Impression/Plan:    1. Irritated acrochordon on the R chest     Cryotherapy procedure note: After verbal consent and discussion of risks and benefits including but no limited to dyspigmentation/scar, blister, and pain, 1 was(were) treated with 1-2mm freeze border for 2 cycles with liquid nitrogen. Post cryotherapy instructions were provided.     2. Inflamed seborrheic keratoses    Cryotherapy procedure note: After verbal consent and discussion of risks and benefits including but no limited to dyspigmentation/scar, blister, and pain, 6 was(were) treated with 1-2mm freeze border for 2 cycles with liquid nitrogen. Post cryotherapy instructions were provided.    3. Cyst on the R inner thigh    Discussed the natural etiology and provided reassurances about the benign nature of the lesion.      4. Seborrheic dermatitis: not active on exam  today    Refill provided of ketoconazole 2% shampoo    5. History of hand dermatitis: not active on exam today    Refill provided of triamcinolone 0.1% ointment.    6. History of nonmelanoma skin cancer (NMSC)  - no evidence of recurrent disease via inspection or palpation; all sites well-healed  - photoprotection discussed (SPF30+ sunscreen and proper use, UPF clothing, sun avoidance, tanning bed avoidance)    - continued skin surveillance recommended      7. Seborrheic keratosis, non irritated    Discussed the natural etiology and provided reassurances about the benign nature of the lesions.     Follow-up in 1 year, earlier for new or changing lesions.       Staff Involved:  Scribe/Staff    Scribe Disclosure  I, Dominic Najjar, am serving as a scribe to document services personally performed by Dr. Abe Wilkerson MD, based on data collection and the provider's statements to me.     Staff attestation:  The documentation recorded by the scribe accurately reflects the services I personally performed and the decisions I personally made. I have made edits where needed.    Abe Wilkerson MD  Staff Dermatologist and Dermatopathologist  , Department of Dermatology

## 2019-10-22 NOTE — NURSING NOTE
Chief Complaint   Patient presents with     Physical     labs a1c labs, potassium, vitamin D, follow up about cyst on inner thigh        Karley Orellana, EMT

## 2019-10-23 ASSESSMENT — ANXIETY QUESTIONNAIRES: GAD7 TOTAL SCORE: 0

## 2019-10-24 ENCOUNTER — HOSPITAL ENCOUNTER (OUTPATIENT)
Dept: PHYSICAL THERAPY | Facility: CLINIC | Age: 69
Setting detail: THERAPIES SERIES
End: 2019-10-24
Attending: NURSE PRACTITIONER
Payer: COMMERCIAL

## 2019-10-24 PROCEDURE — 97112 NEUROMUSCULAR REEDUCATION: CPT | Mod: GP | Performed by: PHYSICAL THERAPIST

## 2019-10-28 ENCOUNTER — TELEPHONE (OUTPATIENT)
Dept: INTERNAL MEDICINE | Facility: CLINIC | Age: 69
End: 2019-10-28

## 2019-10-28 NOTE — TELEPHONE ENCOUNTER
Prior Authorization Retail Medication Request    Medication/Dose: Voltaren 1 % Gel   ICD code (if different than what is on RX):  M25.561  Previously Tried and Failed:  unknown  Rationale:  none    Insurance Name:  [facility]  Insurance ID:  [id number]

## 2019-10-30 NOTE — TELEPHONE ENCOUNTER
Central Prior Authorization Team   [phone]    PA Initiation    Medication: Voltaren 1 % Gel   Insurance Company: [employer] [place] - Phone [phone] Fax [phone]  Pharmacy Filling the Rx: [id number] - [place], [place] - [address]  Filling Pharmacy Phone: [phone]  Filling Pharmacy Fax: [phone]  Start Date: 10/30/2019

## 2019-10-31 NOTE — TELEPHONE ENCOUNTER
Prior Authorization Approval    Authorization Effective Date: 8/1/2019  Authorization Expiration Date: 10/30/2020  Medication: Voltaren 1 % Gel-PA APPROVED   Approved Dose/Quantity:   Reference #: CASE # [id number]   Insurance Company: [employer] [place] - Phone [phone] Fax [phone]  Expected CoPay:       CoPay Card Available:      Foundation Assistance Needed:    Which Pharmacy is filling the prescription (Not needed for infusion/clinic administered): [facility] [id number] [place] [facility] [id number] [place] [address]  Pharmacy Notified: Yes- Pharmacy stated that they placed in order for medication and it will be available on 11/1/2019. **Instructed pharmacy to notify patient when script is ready to /ship.**  Patient Notified: Yes

## 2019-11-04 ENCOUNTER — ANCILLARY PROCEDURE (OUTPATIENT)
Dept: BONE DENSITY | Facility: CLINIC | Age: 69
End: 2019-11-04
Attending: PHYSICIAN ASSISTANT
Payer: COMMERCIAL

## 2019-11-04 DIAGNOSIS — Z79.899 OTHER LONG TERM (CURRENT) DRUG THERAPY: ICD-10-CM

## 2019-11-04 DIAGNOSIS — C50.919 MALIGNANT NEOPLASM OF BREAST IN FEMALE, ESTROGEN RECEPTOR POSITIVE, UNSPECIFIED LATERALITY, UNSPECIFIED SITE OF BREAST (H): ICD-10-CM

## 2019-11-04 DIAGNOSIS — Z79.811 AROMATASE INHIBITOR USE: ICD-10-CM

## 2019-11-04 DIAGNOSIS — Z17.0 MALIGNANT NEOPLASM OF BREAST IN FEMALE, ESTROGEN RECEPTOR POSITIVE, UNSPECIFIED LATERALITY, UNSPECIFIED SITE OF BREAST (H): ICD-10-CM

## 2019-11-04 DIAGNOSIS — M81.0 OSTEOPOROSIS, UNSPECIFIED OSTEOPOROSIS TYPE, UNSPECIFIED PATHOLOGICAL FRACTURE PRESENCE: ICD-10-CM

## 2019-11-07 ENCOUNTER — HOSPITAL ENCOUNTER (OUTPATIENT)
Dept: PHYSICAL THERAPY | Facility: CLINIC | Age: 69
Setting detail: THERAPIES SERIES
End: 2019-11-07
Attending: NURSE PRACTITIONER
Payer: COMMERCIAL

## 2019-11-07 PROCEDURE — 97112 NEUROMUSCULAR REEDUCATION: CPT | Mod: GP | Performed by: PHYSICAL THERAPIST

## 2019-11-08 ENCOUNTER — TELEPHONE (OUTPATIENT)
Dept: INTERNAL MEDICINE | Facility: CLINIC | Age: 69
End: 2019-11-08

## 2019-11-08 NOTE — TELEPHONE ENCOUNTER
Spoke to patient who states that she has an appointment on Monday, 11/11/19 and that cyst has started to drain. Relayed to patient that PCP is not in clinic until 11/12/19 and that it may be best to keep appointment for Monday. Patient stated verbal understanding and that she may go to Urgent care over the weekend. Ambika Ding LPN 11/8/2019 4:23 PM

## 2019-11-08 NOTE — TELEPHONE ENCOUNTER
[facility] Call Center    Phone Message    May a detailed message be left on voicemail: yes    Reason for Call: Symptoms or Concerns     If patient has red-flag symptoms, warm transfer to triage line    Current symptom or concern: Recurrence of cyst on inner thigh    Symptoms have been present for:  1-2 day(s)    Has patient previously been seen for this? Yes    By: Matilde Quiñonez    Date: Last seen 10/22/2019    Are there any new or worsening symptoms? Yes: Pt states she had noted a recurrence of a cyst on her right inner thigh, close to the vagina, earlier this morning. She states she had been seen for this at her last appt, and Matilde had given her an antibiotic cream that had healed the cyst. Pt states she was concerned for a recurrence of MRSA, as she had had it once after surgery at [place] several years ago. Pt is wanting to know if provider wants to drain it, or prescribe another medication. Please advise.      Action Taken: Message routed to:  [facility] ([facility] LUISA

## 2019-11-11 ENCOUNTER — INFUSION THERAPY VISIT (OUTPATIENT)
Dept: ONCOLOGY | Facility: CLINIC | Age: 69
End: 2019-11-11
Attending: INTERNAL MEDICINE
Payer: COMMERCIAL

## 2019-11-11 ENCOUNTER — OFFICE VISIT (OUTPATIENT)
Dept: INTERNAL MEDICINE | Facility: CLINIC | Age: 69
End: 2019-11-11
Payer: COMMERCIAL

## 2019-11-11 ENCOUNTER — APPOINTMENT (OUTPATIENT)
Dept: LAB | Facility: CLINIC | Age: 69
End: 2019-11-11
Attending: INTERNAL MEDICINE
Payer: COMMERCIAL

## 2019-11-11 VITALS
WEIGHT: 186.07 LBS | HEIGHT: 67 IN | HEART RATE: 83 BPM | RESPIRATION RATE: 16 BRPM | OXYGEN SATURATION: 97 % | DIASTOLIC BLOOD PRESSURE: 78 MMHG | TEMPERATURE: 99.2 F | SYSTOLIC BLOOD PRESSURE: 132 MMHG | BODY MASS INDEX: 29.2 KG/M2

## 2019-11-11 VITALS
OXYGEN SATURATION: 97 % | DIASTOLIC BLOOD PRESSURE: 78 MMHG | HEART RATE: 83 BPM | TEMPERATURE: 99.2 F | BODY MASS INDEX: 29.15 KG/M2 | SYSTOLIC BLOOD PRESSURE: 132 MMHG | WEIGHT: 186.1 LBS

## 2019-11-11 DIAGNOSIS — Z79.811 AROMATASE INHIBITOR USE: ICD-10-CM

## 2019-11-11 DIAGNOSIS — E55.9 VITAMIN D DEFICIENCY: ICD-10-CM

## 2019-11-11 DIAGNOSIS — Z17.0 MALIGNANT NEOPLASM OF LEFT BREAST IN FEMALE, ESTROGEN RECEPTOR POSITIVE, UNSPECIFIED SITE OF BREAST (H): ICD-10-CM

## 2019-11-11 DIAGNOSIS — Z92.21 STATUS POST CHEMOTHERAPY: ICD-10-CM

## 2019-11-11 DIAGNOSIS — L72.0 EPIDERMAL CYST: Primary | ICD-10-CM

## 2019-11-11 DIAGNOSIS — C50.912 MALIGNANT NEOPLASM OF LEFT BREAST IN FEMALE, ESTROGEN RECEPTOR POSITIVE, UNSPECIFIED SITE OF BREAST (H): ICD-10-CM

## 2019-11-11 DIAGNOSIS — R11.0 NAUSEA: ICD-10-CM

## 2019-11-11 DIAGNOSIS — Z17.0 MALIGNANT NEOPLASM OF BREAST IN FEMALE, ESTROGEN RECEPTOR POSITIVE, UNSPECIFIED LATERALITY, UNSPECIFIED SITE OF BREAST (H): Primary | ICD-10-CM

## 2019-11-11 DIAGNOSIS — G89.29 OTHER CHRONIC BACK PAIN: ICD-10-CM

## 2019-11-11 DIAGNOSIS — M54.89 OTHER CHRONIC BACK PAIN: ICD-10-CM

## 2019-11-11 DIAGNOSIS — E78.00 PURE HYPERCHOLESTEROLEMIA: ICD-10-CM

## 2019-11-11 DIAGNOSIS — C50.919 MALIGNANT NEOPLASM OF BREAST IN FEMALE, ESTROGEN RECEPTOR POSITIVE, UNSPECIFIED LATERALITY, UNSPECIFIED SITE OF BREAST (H): Primary | ICD-10-CM

## 2019-11-11 DIAGNOSIS — I52 OTHER HEART DISORDERS IN DISEASES CLASSIFIED ELSEWHERE: ICD-10-CM

## 2019-11-11 DIAGNOSIS — R73.09 ELEVATED HEMOGLOBIN A1C: ICD-10-CM

## 2019-11-11 DIAGNOSIS — E03.9 HYPOTHYROIDISM, UNSPECIFIED TYPE: ICD-10-CM

## 2019-11-11 DIAGNOSIS — M85.80 OSTEOPENIA, UNSPECIFIED LOCATION: ICD-10-CM

## 2019-11-11 LAB
ALBUMIN SERPL-MCNC: 4.1 G/DL (ref 3.4–5)
ALP SERPL-CCNC: 69 U/L (ref 40–150)
ALT SERPL W P-5'-P-CCNC: 27 U/L (ref 0–50)
ANION GAP SERPL CALCULATED.3IONS-SCNC: 5 MMOL/L (ref 3–14)
AST SERPL W P-5'-P-CCNC: 17 U/L (ref 0–45)
BASOPHILS # BLD AUTO: 0 10E9/L (ref 0–0.2)
BASOPHILS NFR BLD AUTO: 0.8 %
BILIRUB SERPL-MCNC: 0.3 MG/DL (ref 0.2–1.3)
BUN SERPL-MCNC: 18 MG/DL (ref 7–30)
CALCIUM SERPL-MCNC: 9.2 MG/DL (ref 8.5–10.1)
CHLORIDE SERPL-SCNC: 105 MMOL/L (ref 94–109)
CHOLEST SERPL-MCNC: 210 MG/DL
CO2 SERPL-SCNC: 30 MMOL/L (ref 20–32)
CREAT SERPL-MCNC: 0.66 MG/DL (ref 0.52–1.04)
DIFFERENTIAL METHOD BLD: ABNORMAL
EOSINOPHIL # BLD AUTO: 0.3 10E9/L (ref 0–0.7)
EOSINOPHIL NFR BLD AUTO: 5.3 %
ERYTHROCYTE [DISTWIDTH] IN BLOOD BY AUTOMATED COUNT: 14.6 % (ref 10–15)
GFR SERPL CREATININE-BSD FRML MDRD: >90 ML/MIN/{1.73_M2}
GLUCOSE SERPL-MCNC: 88 MG/DL (ref 70–99)
HBA1C MFR BLD: 5.9 % (ref 0–5.6)
HCT VFR BLD AUTO: 35.7 % (ref 35–47)
HDLC SERPL-MCNC: 66 MG/DL
HGB BLD-MCNC: 11.4 G/DL (ref 11.7–15.7)
IMM GRANULOCYTES # BLD: 0 10E9/L (ref 0–0.4)
IMM GRANULOCYTES NFR BLD: 0.8 %
LDLC SERPL CALC-MCNC: 106 MG/DL
LYMPHOCYTES # BLD AUTO: 0.9 10E9/L (ref 0.8–5.3)
LYMPHOCYTES NFR BLD AUTO: 16.6 %
MCH RBC QN AUTO: 28.2 PG (ref 26.5–33)
MCHC RBC AUTO-ENTMCNC: 31.9 G/DL (ref 31.5–36.5)
MCV RBC AUTO: 88 FL (ref 78–100)
MONOCYTES # BLD AUTO: 0.7 10E9/L (ref 0–1.3)
MONOCYTES NFR BLD AUTO: 13.4 %
NEUTROPHILS # BLD AUTO: 3.4 10E9/L (ref 1.6–8.3)
NEUTROPHILS NFR BLD AUTO: 63.1 %
NONHDLC SERPL-MCNC: 144 MG/DL
NRBC # BLD AUTO: 0 10*3/UL
NRBC BLD AUTO-RTO: 0 /100
PLATELET # BLD AUTO: 228 10E9/L (ref 150–450)
POTASSIUM SERPL-SCNC: 3.1 MMOL/L (ref 3.4–5.3)
PROT SERPL-MCNC: 7.1 G/DL (ref 6.8–8.8)
RBC # BLD AUTO: 4.04 10E12/L (ref 3.8–5.2)
SODIUM SERPL-SCNC: 140 MMOL/L (ref 133–144)
TRIGL SERPL-MCNC: 190 MG/DL
TSH SERPL DL<=0.005 MIU/L-ACNC: 1.91 MU/L (ref 0.4–4)
WBC # BLD AUTO: 5.3 10E9/L (ref 4–11)

## 2019-11-11 PROCEDURE — G0463 HOSPITAL OUTPT CLINIC VISIT: HCPCS | Mod: ZF

## 2019-11-11 PROCEDURE — 82306 VITAMIN D 25 HYDROXY: CPT | Performed by: PHYSICIAN ASSISTANT

## 2019-11-11 PROCEDURE — 85025 COMPLETE CBC W/AUTO DIFF WBC: CPT | Performed by: PHYSICIAN ASSISTANT

## 2019-11-11 PROCEDURE — 25800030 ZZH RX IP 258 OP 636: Mod: ZF | Performed by: INTERNAL MEDICINE

## 2019-11-11 PROCEDURE — 80053 COMPREHEN METABOLIC PANEL: CPT | Performed by: PHYSICIAN ASSISTANT

## 2019-11-11 PROCEDURE — 83036 HEMOGLOBIN GLYCOSYLATED A1C: CPT | Performed by: NURSE PRACTITIONER

## 2019-11-11 PROCEDURE — 99215 OFFICE O/P EST HI 40 MIN: CPT | Mod: ZP | Performed by: INTERNAL MEDICINE

## 2019-11-11 PROCEDURE — 25000128 H RX IP 250 OP 636: Mod: ZF | Performed by: INTERNAL MEDICINE

## 2019-11-11 PROCEDURE — 96365 THER/PROPH/DIAG IV INF INIT: CPT

## 2019-11-11 PROCEDURE — 80061 LIPID PANEL: CPT | Performed by: NURSE PRACTITIONER

## 2019-11-11 PROCEDURE — 84443 ASSAY THYROID STIM HORMONE: CPT | Performed by: NURSE PRACTITIONER

## 2019-11-11 RX ORDER — POTASSIUM CHLORIDE 1500 MG/1
TABLET, EXTENDED RELEASE ORAL
Refills: 3 | COMMUNITY
Start: 2018-11-13 | End: 2020-07-07

## 2019-11-11 RX ORDER — ONDANSETRON 4 MG/1
4-8 TABLET, ORALLY DISINTEGRATING ORAL EVERY 8 HOURS PRN
Qty: 30 TABLET | Refills: 0 | Status: SHIPPED | OUTPATIENT
Start: 2019-11-11 | End: 2020-01-09

## 2019-11-11 RX ORDER — ONDANSETRON 8 MG/1
8 TABLET, FILM COATED ORAL EVERY 8 HOURS PRN
Qty: 30 TABLET | Refills: 1 | Status: SHIPPED | OUTPATIENT
Start: 2019-11-11 | End: 2019-12-26

## 2019-11-11 RX ORDER — ZOLEDRONIC ACID 0.04 MG/ML
4 INJECTION, SOLUTION INTRAVENOUS ONCE
Status: COMPLETED | OUTPATIENT
Start: 2019-11-11 | End: 2019-11-11

## 2019-11-11 RX ORDER — DOXYCYCLINE 100 MG/1
100 CAPSULE ORAL 2 TIMES DAILY
Qty: 14 CAPSULE | Refills: 0 | Status: SHIPPED | OUTPATIENT
Start: 2019-11-11 | End: 2020-05-12

## 2019-11-11 RX ORDER — ZOLEDRONIC ACID 0.04 MG/ML
4 INJECTION, SOLUTION INTRAVENOUS ONCE
Status: CANCELLED | OUTPATIENT
Start: 2019-11-11

## 2019-11-11 RX ADMIN — ZOLEDRONIC ACID 4 MG: 0.04 INJECTION, SOLUTION INTRAVENOUS at 16:38

## 2019-11-11 RX ADMIN — SODIUM CHLORIDE 250 ML: 9 INJECTION, SOLUTION INTRAVENOUS at 16:38

## 2019-11-11 ASSESSMENT — PAIN SCALES - GENERAL: PAINLEVEL: NO PAIN (0)

## 2019-11-11 ASSESSMENT — MIFFLIN-ST. JEOR: SCORE: 1411.75

## 2019-11-11 NOTE — PATIENT INSTRUCTIONS
Contact Numbers    [facility] Main Line (for Scheduling/Triage/After Hours Nurse Line): [phone]    Please call the [facility] nurse triage or the after hours nurse line if you experience a temperature greater than or equal to 100.5, shaking chills, have uncontrolled nausea, vomiting and/or diarrhea, dizziness, lightheadedness, shortness of breath, chest pain, bleeding, unexplained bruising, or if you have any other new/concerning symptoms, questions or concerns.     If you are having any concerning symptoms or wish to speak to a provider before your next infusion visit, please call your care coordinator or triage to notify them so we can adequately serve you.     If you need a refill on a narcotic prescription or other medication, please call triage before your infusion appointment.

## 2019-11-11 NOTE — PROGRESS NOTES
[facility]  ONCOLOGY FOLLOW-UP VISIT NOTE    PATIENT NAME: Nadira Perez    YOB: 1950  MRN :[id number]  DATE OF VISIT: Nov 11, 2019     REASON FOR VISIT : breast cancer follow up.     HISTORY OF PRESENT ILLNESS : The patient is a 69-year-old woman who in 06/2007 had the onset of left-sided breast discomfort that extended into the axilla. She ultimately did undergo mammogram on 06/13/2007, which identified a mass at the 2:30 position in the left breast. Ultrasound also was notable for some skin and areolar complex thickening. She did undergo biopsy of the mass, and this was consistent with an infiltrating ductal carcinoma that was grade 2. The tumor was ER positive, VT positive, and HER2 negative by FISH. She also had a biopsy of left axillary lymph node versus the tail of the axilla, and this was consistent with metastatic carcinoma. Following the diagnosis the patient did have metastatic staging to include a PET CT scan. This demonstrated increased activity in the left breast as well as the lymph nodes, but was otherwise negative. There was also an MRI of the breast performed, and this demonstrated other lesions of the left breast, but no abnormalities on the right.     She initiated neoadjuvant chemotherapy for her inflammatory breast cancer on 07/13/2007. She received  for 3 cycles through 08/24/2007. This was followed by Taxotere for 3 cycles, which was administered 09/14/2007 through 10/26/2007. Following her chemotherapy she did undergo a left-sided mastectomy with axillary lymph node dissection on 11/20/2007 by Dr. Mauro Mei. The patient did have residual carcinoma with a 1.0 cm tumor as well as associated DCIS. Thirteen of 33 lymph nodes were positive, the largest of which was 0.6 cm of tumor infiltration and included extracapsular extension. The patient also had a right-sided prophylactic mastectomy, which did not demonstrate any noninvasive  "or invasive carcinoma. Just prior to her surgery Ismael was placed on Arimidex (start date 11/15/2007). Following her surgery she did have some issues with left chest wall cellulitis as well as some lymphedema of the left upper extremity. She ultimately did go on to receive radiation therapy under the direction of Dr. Nghia Burch. She received radiation to the left chest wall at a dose of 6440 cGy, to the left supraclavicular fossa at a dose of 5040 cGy, and to the left internal mammary chain at a dose of 5080 cGy. Last dose of radiation was administered on 03/07/2008.     INTERVAL HISTORY:  Nadira is here for routine visit.   Ms. Perez completed 10 years of endocrine therapy with Arimidex and then tamoxifen.  In 11/2017, she then went back on Arimidex for a prolonged extended therapy as per the MA17 trial.      She says she is overall tolerating it well.  No issues with hot flashes.  She continues to have intermittent arthralgias worse in her knees.  No other joint pains.       No fevers or chills, no chest pain or shortness of breath, no nausea.     She has been having problems with vertigo and is working with PT     No new medical problems.  She continues to have intermittent neuropathy.      REVIEW OF SYSTEMS:  Her 10-point review of systems is otherwise negative.       PHYSICAL EXAMINATION:   /78   Pulse 83   Temp 99.2  F (37.3  C) (Oral)   Resp 16   Ht 1.702 m (5' 7.01\")   Wt 84.4 kg (186 lb 1.1 oz)   SpO2 97%   BMI 29.14 kg/m    Wt Readings from Last 5 Encounters:   11/11/19 84.4 kg (186 lb 1.1 oz)   11/11/19 84.4 kg (186 lb 1.6 oz)   10/22/19 81.6 kg (180 lb)   10/10/19 81.6 kg (180 lb)   07/22/19 80.7 kg (178 lb)     GENERAL : Alert and oriented , not in distress .   HEENT: Normocephalic head.  Anicteric sclerae.No oral ulceration. No nystagmus.    NECK: Supple, no thyromegaly.   LYMPH:  No cervical, supraclavicular, axillary, epitrochlear, or inguinal lymphadenopathy.  BREAST: She is status " post bilateral mastectomies without reconstruction. No chest wall mass or concerning lesions identified. Multiple chest telangectasias in the left side.  There are no dominant masses on palpation of the chest wall, along the mastectomy scars or into the axilla bilaterally.   LUNGS: Clear breath sounds bilaterally, no wheezing, no crackles.    CARDIOVASCULAR: S1 and S2 well heard, regular rate and rhythm, no murmur or gallop. JVP absent.    ABDOMEN: Soft, nontender, positive bowel sounds. No organomegaly was appreciated.  EXTREMITIES: No cyanosis, no clubbing, no edema.    SKIN: No skin rash, no purpura or petechiae.    NEUROLOGIC: Normal mental status. Alert and oriented .Cranial nerves II through XII grossly intact.  No focal weakness. Sensory examination grossly intact.  Normal coordination( normal finger-to-nose touching) .  Romberg`s sign is negative. She was observed while she was walking and no significant abnormalities noted.      Lab Results   Component Value Date    WBC 5.3 11/11/2019     Lab Results   Component Value Date    RBC 4.04 11/11/2019     Lab Results   Component Value Date    HGB 11.4 11/11/2019     Lab Results   Component Value Date    HCT 35.7 11/11/2019     No components found for: MCT  Lab Results   Component Value Date    MCV 88 11/11/2019     Lab Results   Component Value Date    MCH 28.2 11/11/2019     Lab Results   Component Value Date    MCHC 31.9 11/11/2019     Lab Results   Component Value Date    RDW 14.6 11/11/2019     Lab Results   Component Value Date     11/11/2019     Recent Labs   Lab Test 05/13/19  1135 11/13/18  0956  02/15/18  1948   NA  --  138  --  141   POTASSIUM 3.4 3.1*  --  3.2*   CHLORIDE  --  103  --  104   CO2  --  28  --  29   ANIONGAP  --  7  --  8   GLC  --  106*  --  108*   BUN  --  19  --  15   CR 0.70 0.73   < > 0.60   RAMBO 9.8 9.3   < > 8.9    < > = values in this interval not displayed.     Liver Function Studies -   Recent Labs   Lab Test  05/13/19  1135 11/13/18  0956   PROTTOTAL  --  7.8   ALBUMIN 3.9 4.2   BILITOTAL  --  0.5   ALKPHOS  --  80   AST  --  22   ALT  --  28       ASSESSMENT AND PLAN:  1. Inflammatory breast cancer, stage IIIC, the left breast, ER positive, HER2 negative, status post neoadjuvant chemotherapy followed by bilateral mastectomies. Chest wall radiotherapy completed in 03/2008. She was on adjuvant Arimidex from November 2007 until November 2012, and then was switched to tamoxifen. She completed 10 years of adjuvant hormonal therapy in November 2017.  At that time, she was switched back to arimidex per MA 17 trial.  She is tolerating this well.    We reviewed the pros and cons of staying on an AI.  Based on her advanced disease, I recommended she remain on the arimidex.  She is in agreement.    2. Gait imbalance : Unchanged.  Normal brain MRI in November 2017.  Continue pilates and PT.    3. Osteoporosis. Bone density scan improved.  She is on zometa yearly.    4. Vaginal dryness - on replens.        Khushbu Louise MD

## 2019-11-11 NOTE — LETTER
11/11/2019       RE: Nadira Perez  [id number]  [facility] [address]     Dear Colleague,    Thank you for referring your patient, Nadira Perez, to the [facility]. Please see a copy of my visit note below.    [facility]  ONCOLOGY FOLLOW-UP VISIT NOTE    PATIENT NAME: Nadira Perez    YOB: 1950  MRN :[id number]  DATE OF VISIT: Nov 11, 2019     REASON FOR VISIT : breast cancer follow up.     HISTORY OF PRESENT ILLNESS : The patient is a 69-year-old woman who in 06/2007 had the onset of left-sided breast discomfort that extended into the axilla. She ultimately did undergo mammogram on 06/13/2007, which identified a mass at the 2:30 position in the left breast. Ultrasound also was notable for some skin and areolar complex thickening. She did undergo biopsy of the mass, and this was consistent with an infiltrating ductal carcinoma that was grade 2. The tumor was ER positive, OK positive, and HER2 negative by FISH. She also had a biopsy of left axillary lymph node versus the tail of the axilla, and this was consistent with metastatic carcinoma. Following the diagnosis the patient did have metastatic staging to include a PET CT scan. This demonstrated increased activity in the left breast as well as the lymph nodes, but was otherwise negative. There was also an MRI of the breast performed, and this demonstrated other lesions of the left breast, but no abnormalities on the right.     She initiated neoadjuvant chemotherapy for her inflammatory breast cancer on 07/13/2007. She received  for 3 cycles through 08/24/2007. This was followed by Taxotere for 3 cycles, which was administered 09/14/2007 through 10/26/2007. Following her chemotherapy she did undergo a left-sided mastectomy with axillary lymph node dissection on 11/20/2007 by Dr. Mauro Mei. The patient did have residual carcinoma with a 1.0 cm tumor as well as  "associated DCIS. Thirteen of 33 lymph nodes were positive, the largest of which was 0.6 cm of tumor infiltration and included extracapsular extension. The patient also had a right-sided prophylactic mastectomy, which did not demonstrate any noninvasive or invasive carcinoma. Just prior to her surgery Ismael was placed on Arimidex (start date 11/15/2007). Following her surgery she did have some issues with left chest wall cellulitis as well as some lymphedema of the left upper extremity. She ultimately did go on to receive radiation therapy under the direction of Dr. Nghia Burch. She received radiation to the left chest wall at a dose of 6440 cGy, to the left supraclavicular fossa at a dose of 5040 cGy, and to the left internal mammary chain at a dose of 5080 cGy. Last dose of radiation was administered on 03/07/2008.     INTERVAL HISTORY:  Nadira is here for routine visit.   Ms. Perez completed 10 years of endocrine therapy with Arimidex and then tamoxifen.  In 11/2017, she then went back on Arimidex for a prolonged extended therapy as per the MA17 trial.      She says she is overall tolerating it well.  No issues with hot flashes.  She continues to have intermittent arthralgias worse in her knees.  No other joint pains.       No fevers or chills, no chest pain or shortness of breath, no nausea.     She has been having problems with vertigo and is working with PT     No new medical problems.  She continues to have intermittent neuropathy.      REVIEW OF SYSTEMS:  Her 10-point review of systems is otherwise negative.       PHYSICAL EXAMINATION:   /78   Pulse 83   Temp 99.2  F (37.3  C) (Oral)   Resp 16   Ht 1.702 m (5' 7.01\")   Wt 84.4 kg (186 lb 1.1 oz)   SpO2 97%   BMI 29.14 kg/m     Wt Readings from Last 5 Encounters:   11/11/19 84.4 kg (186 lb 1.1 oz)   11/11/19 84.4 kg (186 lb 1.6 oz)   10/22/19 81.6 kg (180 lb)   10/10/19 81.6 kg (180 lb)   07/22/19 80.7 kg (178 lb)     GENERAL : Alert and " oriented , not in distress .   HEENT: Normocephalic head.  Anicteric sclerae.No oral ulceration. No nystagmus.    NECK: Supple, no thyromegaly.   LYMPH:  No cervical, supraclavicular, axillary, epitrochlear, or inguinal lymphadenopathy.  BREAST: She is status post bilateral mastectomies without reconstruction. No chest wall mass or concerning lesions identified. Multiple chest telangectasias in the left side.  There are no dominant masses on palpation of the chest wall, along the mastectomy scars or into the axilla bilaterally.   LUNGS: Clear breath sounds bilaterally, no wheezing, no crackles.    CARDIOVASCULAR: S1 and S2 well heard, regular rate and rhythm, no murmur or gallop. JVP absent.    ABDOMEN: Soft, nontender, positive bowel sounds. No organomegaly was appreciated.  EXTREMITIES: No cyanosis, no clubbing, no edema.    SKIN: No skin rash, no purpura or petechiae.    NEUROLOGIC: Normal mental status. Alert and oriented .Cranial nerves II through XII grossly intact.  No focal weakness. Sensory examination grossly intact.  Normal coordination( normal finger-to-nose touching) .  Romberg`s sign is negative. She was observed while she was walking and no significant abnormalities noted.      Lab Results   Component Value Date    WBC 5.3 11/11/2019     Lab Results   Component Value Date    RBC 4.04 11/11/2019     Lab Results   Component Value Date    HGB 11.4 11/11/2019     Lab Results   Component Value Date    HCT 35.7 11/11/2019     No components found for: MCT  Lab Results   Component Value Date    MCV 88 11/11/2019     Lab Results   Component Value Date    MCH 28.2 11/11/2019     Lab Results   Component Value Date    MCHC 31.9 11/11/2019     Lab Results   Component Value Date    RDW 14.6 11/11/2019     Lab Results   Component Value Date     11/11/2019     Recent Labs   Lab Test 05/13/19  1135 11/13/18  0956  02/15/18  1948   NA  --  138  --  141   POTASSIUM 3.4 3.1*  --  3.2*   CHLORIDE  --  103  --  104    CO2  --  28  --  29   ANIONGAP  --  7  --  8   GLC  --  106*  --  108*   BUN  --  19  --  15   CR 0.70 0.73   < > 0.60   RAMBO 9.8 9.3   < > 8.9    < > = values in this interval not displayed.     Liver Function Studies -   Recent Labs   Lab Test 05/13/19  1135 11/13/18  0956   PROTTOTAL  --  7.8   ALBUMIN 3.9 4.2   BILITOTAL  --  0.5   ALKPHOS  --  80   AST  --  22   ALT  --  28       ASSESSMENT AND PLAN:  1. Inflammatory breast cancer, stage IIIC, the left breast, ER positive, HER2 negative, status post neoadjuvant chemotherapy followed by bilateral mastectomies. Chest wall radiotherapy completed in 03/2008. She was on adjuvant Arimidex from November 2007 until November 2012, and then was switched to tamoxifen. She completed 10 years of adjuvant hormonal therapy in November 2017.  At that time, she was switched back to arimidex per MA 17 trial.  She is tolerating this well.    We reviewed the pros and cons of staying on an AI.  Based on her advanced disease, I recommended she remain on the arimidex.  She is in agreement.    2. Gait imbalance : Unchanged.  Normal brain MRI in November 2017.  Continue pilates and PT.    3. Osteoporosis. Bone density scan improved.  She is on zometa yearly.    4. Vaginal dryness - on replens.        Khushbu Louise MD

## 2019-11-11 NOTE — NURSING NOTE
"Oncology Rooming Note    November 11, 2019 3:21 PM   Nadira Perez is a 69 year old female who presents for:    Chief Complaint   Patient presents with     Blood Draw     Labs drawn via /PIV placed by RN in lab. VS taken.     Oncology Clinic Visit     [facility] RETURN- BREAST CA     Initial Vitals: /78   Pulse 83   Temp 99.2  F (37.3  C) (Oral)   Resp 16   Ht 1.702 m (5' 7.01\")   Wt 84.4 kg (186 lb 1.1 oz)   SpO2 97%   BMI 29.14 kg/m   Estimated body mass index is 29.14 kg/m  as calculated from the following:    Height as of this encounter: 1.702 m (5' 7.01\").    Weight as of this encounter: 84.4 kg (186 lb 1.1 oz). Body surface area is 2 meters squared.  No Pain (0) Comment: Data Unavailable   No LMP recorded. Patient has had a hysterectomy.  Allergies reviewed: Yes  Medications reviewed: Yes    Medications: Medication refills not needed today.  Pharmacy name entered into [employer]:    [facility]  [facility] [address] [facility] [id number]  [place]    Clinical concerns: No new concerns. Dani was notified.      Kumar Sunshine LPN            "

## 2019-11-11 NOTE — PROGRESS NOTES
[facility]  Primary Care Center   Sharonda Medel MD  11/11/2019      Chief Complaint:   Derm Problem     History of Present Illness:   Nadira Perez is a 69 year old female with a history of  breast cancer, osteoporosis, thyroid disease, hypertension, and hyperlipidemia who presents for evaluation of cyst in her groin area. She had a cyst in the same area and was seen for it on 10/10. She treated it with hot soaks and Bactrim, and it went away. Since then it recurred. It opened yesterday and has drained down. The drainage was initially reddish brown and then became yellowish brown. She is otherwise feeling well and denies fevers. She has not been doing anything that causes trauma to maikel area recently such as riding a bike. The area has not been hot and sweaty and she denies ingrown hairs. The area is quite tender.    Other concerns discussed:  1. She goes to the balance clinic and would like her Zofran refilled.     Review of Systems:   Pertinent items are noted in HPI or as in patient entered ROS below, remainder of complete ROS is negative.     Active Medications:      Acetaminophen (APAP 500 PO), Take 1,000 mg by mouth 2 times daily, Disp: , Rfl:      amLODIPine (NORVASC) 10 MG tablet, Take 1 tablet (10 mg) by mouth daily, Disp: 90 tablet, Rfl: 3     anastrozole (ARIMIDEX) 1 MG tablet, TAKE 1 TABLET BY MOUTH EVERY DAY, Disp: 90 tablet, Rfl: 3     atorvastatin (LIPITOR) 40 MG tablet, Take 1 tablet (40 mg) by mouth daily Please put on profile- pt just received 90-day supply of cholesterol meds, Disp: 90 tablet, Rfl: 3     cholecalciferol 2000 units CAPS, Take 6,000 Units by mouth daily, Disp: 270 capsule, Rfl: 3     Coenzyme Q10 (COQ10) 200 MG CAPS, Take 1 capsule by mouth daily, Disp: , Rfl:      gabapentin (NEURONTIN) 300 MG capsule, Take 1-2 capsules by mouth every 8 hours, Disp: 540 capsule, Rfl: 3     glucosamine-chondroitin 500-400 MG CAPS per capsule, Take 1 capsule by mouth 2 times daily , Disp: ,  Rfl:      HYDROcodone-acetaminophen (NORCO) 5-325 MG tablet, Take 1 tablet by mouth every 6 hours as needed, Disp: 40 tablet, Rfl: 0     ibuprofen (ADVIL/MOTRIN) 600 MG tablet, Take 600 mg by mouth every 6 hours as needed for moderate pain, Disp: , Rfl:      ketoconazole (NIZORAL) 2 % external shampoo, Apply topically daily as needed for itching or irritation, Disp: 120 mL, Rfl: 11     levothyroxine (SYNTHROID/LEVOTHROID) 112 MCG tablet, TAKE 1/2 TABLET BY MOUTH DAILY, Disp: 45 tablet, Rfl: 3     lisinopril (PRINIVIL/ZESTRIL) 40 MG tablet, Take 1 tablet (40 mg) by mouth daily, Disp: 90 tablet, Rfl: 2     LORazepam (ATIVAN) 2 MG tablet, Take 1 tablet at night for sleep, Disp: 30 tablet, Rfl: 1     metoprolol succinate ER (TOPROL-XL) 50 MG 24 hr tablet, Take 1 tablet (50 mg) by mouth daily, Disp: 90 tablet, Rfl: 1     Multiple Vitamin (MULTI-VITAMIN) per tablet, Take 1 tablet by mouth daily., Disp: , Rfl:      Omega-3 Fatty Acids (OMEGA-3 FISH OIL PO), Take 1 capsule by mouth daily , Disp: , Rfl:      potassium chloride ER (K-DUR/KLOR-CON M) 20 MEQ CR tablet, TAKE 1 TABLET (20 MEQ) BY MOUTH 2 TIMES DAILY, Disp: , Rfl: 3     sulfamethoxazole-trimethoprim (BACTRIM DS/SEPTRA DS) 800-160 MG tablet, Take 1 tablet by mouth 2 times daily, Disp: 14 tablet, Rfl: 0     terbinafine (LAMISIL AT) 1 % external cream, Apply topically 2 times daily For fungal infection not resolved with other antifungals (e.g. Clotrimazole), Disp: 24 g, Rfl: 11     triamcinolone (KENALOG) 0.1 % external ointment, Apply topically 2 times daily, Disp: 30 g, Rfl: 11     triamterene-HCTZ (MAXZIDE-25) 37.5-25 MG tablet, Take 1 tablet by mouth daily, Disp: 90 tablet, Rfl: 3     Vaginal Lubricant (REPHRESH) GEL, Place 2 g vaginally every 3 days, Disp: 14 Box, Rfl: 3     VOLTAREN 1 % topical gel, Apply 4 grams to knees or 2 grams to hands four times daily using enclosed dosing card., Disp: 100 g, Rfl: 1     Allergies:   Penicillins      Past Medical  History:  Arthritis  Bone disease  Breast cancer (infiltrating ductal carcinoma, grade 2, ER+, WI+, HER2-  Hearing problem   Kidney stones  Hyperlipidemia  Hypertension  Hypopotassemia  Lymphedema  Medullary sponge kidney  Osteopenia  Reduced vision  Squamous cell skin cancer  Thyroid disease  Murmurs  Nephrolithiasis  Osteoarthrosis  Inflamed seborrheic keratosis  Osteoporosis  Hypovitaminosis D  Contact dermatitis  Anterior basement membrane dystrophy, both eyes  Corneal opacity  Chronic pain of right knee  Aromatase inhibitor use  Degenerative disc disease, lumbar     Past Surgical History:  Abdomen surgery (ovarian cyst, mesh)  Arthrodesis, wrist, left - 02/14/2013  Vaginal skin biopsy  Cataract IOL - 03/13/2018 (right), 02/20/2018 (left)  Colonoscopy - 12/24/2013  Cosmetic surgery  EGD - 11/23/2016  Right external ear surgery  Radial keratotomy   Graft bone from iliac crest - 02/14/2013  Umbilical hernia repair - 1954  Total hysterectomy - 05/03/2000  Mastectomy, bilateral   Parathyroidectomy - 09/23/2004  Rhinoplasty - 12/31/1985  Tonsillectomy - 03/01/1968  Remove hardware hand - 09/24/2013     Family History:   Heart disease - father (AAA)  Hypertension - father  Neurologic disorder - mother (aneurysm of cerebral artery, dementia)  Diabetes - mother, maternal grandmother, maternal aunts (x2)  Thyroid disease - mother  Cerebrovascular disease - mother  Osteoporosis - mother  Melanoma - maternal aunt  Glaucoma - maternal aunt  Peripheral neuropathy - brother  Obesity - other  Chronic obstructive pulmonary disease (COPD) - maternal grandfather, father  Asthma - maternal grandfather   Breast cancer - cousin     Social History:   The patient is , a nonsmoker, and does occasionally consume alcohol.       Physical Exam:   /78 (BP Location: Right arm, Patient Position: Sitting, Cuff Size: Adult Large)   Pulse 83   Temp 99.2  F (37.3  C) (Oral)   Wt 84.4 kg (186 lb 1.6 oz)   SpO2 97%   BMI 29.15  kg/m     Constitutional: Alert, oriented, pleasant, no acute distress  Head: Normocephalic, atraumatic  Musculoskeletal: No edema, normal muscle tone, normal gait  Neurologic: Alert and oriented, cranial nerves 2-12 intact.  Skin: Approximately 1 cm area of induration and hyperpigmentation with central punctate papule along right perineal region. No erythema or drainage present.   Psychiatric: normal mentation, affect and mood        Assessment and Plan:  Epidermal cyst  Encouraged her to continue using hot compresses and hot soaks, avoid tight/hot clothing. No indication for I&D.  - doxycycline hyclate (VIBRAMYCIN) 100 MG capsule  Dispense: 14 capsule; Refill: 0    Nausea  Refill provided.  - ondansetron (ZOFRAN-ODT) 4 MG ODT tab  Dispense: 30 tablet; Refill: 0       Follow-up: No follow-ups on file.        Scribe Disclosure:  TAMI Suzi Supriya, am serving as a scribe to document services personally performed by Sharonda Medel MD at this visit, based upon the provider's statements to me. All documentation has been reviewed by the aforementioned provider prior to being entered into the official medical record.    Portions of this medical record were completed by a scribe. UPON MY REVIEW AND AUTHENTICATION BY ELECTRONIC SIGNATURE, this confirms (a) I performed the applicable clinical services, and (b) the record is accurate.   Sharonda Medel MD  Internal Medicine

## 2019-11-11 NOTE — PROGRESS NOTES
Infusion Nursing Note:  Nadira Perez presents today for Zometa.    Patient seen by provider today: Yes: Dr. Louise   present during visit today: Not Applicable.    Note: No concerns or complaints after MD visit. Pt to receive MRI spine soon for chronic back pain.     Intravenous Access:  Peripheral IV placed.    Treatment Conditions:  Lab Results   Component Value Date     11/11/2019                   Lab Results   Component Value Date    POTASSIUM 3.1 11/11/2019           Lab Results   Component Value Date    MAG 2.2 06/11/2009            Lab Results   Component Value Date    CR 0.66 11/11/2019                   Lab Results   Component Value Date    RAMBO 9.2 11/11/2019                Lab Results   Component Value Date    BILITOTAL 0.3 11/11/2019           Lab Results   Component Value Date    ALBUMIN 4.1 11/11/2019                    Lab Results   Component Value Date    ALT 27 11/11/2019           Lab Results   Component Value Date    AST 17 11/11/2019       Results reviewed, labs MET treatment parameters, ok to proceed with treatment.  Decreased potassium noted but forgot to address with patient during infusion visit. MyMessage sent to patient for follow-up if not already address with Dr. Louise today. Patient denied any hypokalemic S/S.    Post Infusion Assessment:  Patient tolerated infusion without incident.  Blood return noted pre and post infusion.  Site patent and intact, free from redness, edema or discomfort.  No evidence of extravasations.  Access discontinued per protocol.       Discharge Plan:   Patient declined prescription refills.  Discharge instructions reviewed with: Patient.  Patient and/or family verbalized understanding of discharge instructions and all questions answered.  AVS to patient via [email or web address].  Patient will return in 6 months for follow-up (Zometa yearly per MD note). Yet to be scheduled - patient aware.  Patient discharged in stable condition accompanied by:  self.  Departure Mode: Ambulatory.    Maya Ackerman RN                         normal rate and rhythm, no chest pain and no edema.

## 2019-11-11 NOTE — NURSING NOTE
Chief Complaint   Patient presents with     Derm Problem     pt states she has a cyst that came on suddenly       Jeimy Delgado CMA at 1:56 PM on 11/11/2019.

## 2019-11-11 NOTE — PATIENT INSTRUCTIONS
[facility] Medication Refill Request Information:  * Please contact your pharmacy regarding ANY request for medication refills.  ** [facility] Prescription Fax = [phone]  * Please allow 3 business days for routine medication refills.  * Please allow 5 business days for controlled substance medication refills.     [facility] Test Result notification information:  *You will be notified with in 7-10 days of your appointment day regarding the results of your test.  If you are on MyChart you will be notified as soon as the provider has reviewed the results and signed off on them.    [facility]: [phone]

## 2019-11-12 ENCOUNTER — HOSPITAL ENCOUNTER (OUTPATIENT)
Dept: PHYSICAL THERAPY | Facility: CLINIC | Age: 69
Setting detail: THERAPIES SERIES
End: 2019-11-12
Attending: NURSE PRACTITIONER
Payer: COMMERCIAL

## 2019-11-12 LAB — DEPRECATED CALCIDIOL+CALCIFEROL SERPL-MC: 62 UG/L (ref 20–75)

## 2019-11-12 PROCEDURE — 97112 NEUROMUSCULAR REEDUCATION: CPT | Mod: GP | Performed by: PHYSICAL THERAPIST

## 2019-11-18 DIAGNOSIS — M85.80 OSTEOPENIA, UNSPECIFIED LOCATION: ICD-10-CM

## 2019-11-18 DIAGNOSIS — C50.919 MALIGNANT NEOPLASM OF FEMALE BREAST (H): Primary | ICD-10-CM

## 2019-11-18 DIAGNOSIS — M81.0 OSTEOPOROSIS: ICD-10-CM

## 2019-12-20 ENCOUNTER — ANCILLARY PROCEDURE (OUTPATIENT)
Dept: CARDIOLOGY | Facility: CLINIC | Age: 69
End: 2019-12-20
Attending: INTERNAL MEDICINE
Payer: COMMERCIAL

## 2019-12-20 ENCOUNTER — ANCILLARY PROCEDURE (OUTPATIENT)
Dept: MRI IMAGING | Facility: CLINIC | Age: 69
End: 2019-12-20
Attending: INTERNAL MEDICINE
Payer: COMMERCIAL

## 2019-12-20 DIAGNOSIS — G89.29 OTHER CHRONIC BACK PAIN: ICD-10-CM

## 2019-12-20 DIAGNOSIS — C50.919 MALIGNANT NEOPLASM OF BREAST IN FEMALE, ESTROGEN RECEPTOR POSITIVE, UNSPECIFIED LATERALITY, UNSPECIFIED SITE OF BREAST (H): ICD-10-CM

## 2019-12-20 DIAGNOSIS — M54.89 OTHER CHRONIC BACK PAIN: ICD-10-CM

## 2019-12-20 DIAGNOSIS — I52 OTHER HEART DISORDERS IN DISEASES CLASSIFIED ELSEWHERE: ICD-10-CM

## 2019-12-20 DIAGNOSIS — Z17.0 MALIGNANT NEOPLASM OF BREAST IN FEMALE, ESTROGEN RECEPTOR POSITIVE, UNSPECIFIED LATERALITY, UNSPECIFIED SITE OF BREAST (H): ICD-10-CM

## 2019-12-20 DIAGNOSIS — Z92.21 STATUS POST CHEMOTHERAPY: ICD-10-CM

## 2019-12-20 RX ORDER — GADOBUTROL 604.72 MG/ML
10 INJECTION INTRAVENOUS ONCE
Status: COMPLETED | OUTPATIENT
Start: 2019-12-20 | End: 2019-12-20

## 2019-12-20 RX ADMIN — GADOBUTROL 9 ML: 604.72 INJECTION INTRAVENOUS at 10:59

## 2019-12-20 NOTE — DISCHARGE INSTRUCTIONS
MRI Contrast Discharge Instructions    The IV contrast you received today will pass out of your body in your  urine. This will happen in the next 24 hours. You will not feel this process.  Your urine will not change color.    Drink at least 4 extra glasses of water or juice today (unless your doctor  has restricted your fluids). This reduces the stress on your kidneys.  You may take your regular medicines.    If you are on dialysis: It is best to have dialysis today.    If you have a reaction: Most reactions happen right away. If you have  any new symptoms after leaving the hospital (such as hives or swelling),  call your hospital at the correct number below. Or call your family doctor.  If you have breathing distress or wheezing, call 911.    Special instructions: ***    I have read and understand the above information.    Signature:______________________________________ Date:___________    Staff:__________________________________________ Date:___________     Time:__________    [facility] Departments:    ___[place]: [phone]  ___[facility]: [phone]  ___[place]: [phone] ___[facility]: [phone]  ___[facility]: [phone]  ___[facility]: [phone]  ___[phone]  ___[facility]: [phone]  ___[phone]

## 2019-12-23 DIAGNOSIS — M25.531 RIGHT WRIST PAIN: Primary | ICD-10-CM

## 2019-12-30 DIAGNOSIS — G89.29 OTHER CHRONIC BACK PAIN: Primary | ICD-10-CM

## 2019-12-30 DIAGNOSIS — M54.89 OTHER CHRONIC BACK PAIN: Primary | ICD-10-CM

## 2020-01-02 ENCOUNTER — ANCILLARY PROCEDURE (OUTPATIENT)
Dept: GENERAL RADIOLOGY | Facility: CLINIC | Age: 70
End: 2020-01-02
Attending: ORTHOPAEDIC SURGERY
Payer: COMMERCIAL

## 2020-01-02 ENCOUNTER — OFFICE VISIT (OUTPATIENT)
Dept: ORTHOPEDICS | Facility: CLINIC | Age: 70
End: 2020-01-02
Payer: OTHER MISCELLANEOUS

## 2020-01-02 ENCOUNTER — ANCILLARY PROCEDURE (OUTPATIENT)
Dept: GENERAL RADIOLOGY | Facility: CLINIC | Age: 70
End: 2020-01-02
Attending: ORTHOPAEDIC SURGERY
Payer: OTHER MISCELLANEOUS

## 2020-01-02 DIAGNOSIS — M25.531 RIGHT WRIST PAIN: ICD-10-CM

## 2020-01-02 DIAGNOSIS — M25.532 LEFT WRIST PAIN: Primary | ICD-10-CM

## 2020-01-02 DIAGNOSIS — M25.531 RIGHT WRIST PAIN: Primary | ICD-10-CM

## 2020-01-02 DIAGNOSIS — M25.532 LEFT WRIST PAIN: ICD-10-CM

## 2020-01-02 RX ORDER — TRIAMCINOLONE ACETONIDE 40 MG/ML
20 INJECTION, SUSPENSION INTRA-ARTICULAR; INTRAMUSCULAR
Status: DISCONTINUED | OUTPATIENT
Start: 2020-01-02 | End: 2020-05-12

## 2020-01-02 RX ORDER — LIDOCAINE HYDROCHLORIDE 10 MG/ML
2 INJECTION, SOLUTION EPIDURAL; INFILTRATION; INTRACAUDAL; PERINEURAL
Status: DISCONTINUED | OUTPATIENT
Start: 2020-01-02 | End: 2020-05-12

## 2020-01-02 RX ADMIN — TRIAMCINOLONE ACETONIDE 20 MG: 40 INJECTION, SUSPENSION INTRA-ARTICULAR; INTRAMUSCULAR at 11:00

## 2020-01-02 RX ADMIN — LIDOCAINE HYDROCHLORIDE 2 ML: 10 INJECTION, SOLUTION EPIDURAL; INFILTRATION; INTRACAUDAL; PERINEURAL at 11:00

## 2020-01-02 NOTE — LETTER
"2020       RE: Nadira Perez  [address]  [facility] [address]     Dear Colleague,    Thank you for referring your patient, Nadira Perez, to the [facility] at [facility]. Please see a copy of my visit note below.    [facility]  Orthopedics  Lien Prajapati MD  2020     Name: Nadira Perez  MRN: [id number]  Age: 69 year old  : 1950  Referring provider: Referred Self     Chief Complaint: Wrist pain    Date of Injury: chronic    History of Present Illness:   Nadira Perez is a 69 year old, right handed female work comp patient who presents today for follow-up regarding bilateral wrist pain.  She was last seen on 18 at which time she had ongoing left > right wrist pain which limited her range of motion.   She was provided new wrist splints. Today, she presents with \"catching and locking\" of her right wrist when she extends it. She has also noticed an increase in the size of a dorsal bump in the same area where she feels it catch.    Review of Systems:   A 10-point review of systems was obtained and is negative except for as noted in the HPI.     Physical Examination:  General: Healthy appearing female. Affect appropriate. Normal gait. Alert and oriented to surroundings.   Right Upper Extremity: There is a 1.5 x 1.5 cm soft tissue mass over the dorsal radial side of the radiocarpal joint. There is moderate tenderness to palpation over the area. It does not feel well circumscribed like a ganglion cyst, appears not to move with the extensor tendons. There is a snapping palpated underneath the area of swelling with passive extension of the wrist that is reproducible.        Imaging:  Radiographs of the bilateral wrists - 3 views (2020)  3 views of the bilateral wrist show bilateral scaphoid excisions with 4 corner fusions. These fusions appear well healed with intact hardware. The right side lateral " demonstrates an osteophyte off of the dorsal lip of the distal radius which was present in the x-ray taken in 12/2018. The left wrist demonstrates interval formation of a volar osteophyte compared to previous radiographs. There is some increased sclerosis of the radiocarpal joint compared to prior study.    I have independently reviewed the above imaging studies; the results were discussed with the patient.     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 It was done in the office today and we will see her back in the office in 6 weeks to re-evaluate her right wrist. She may call and cancel the appointment if she feels that it has gotten much better.        PROCEDURE:  After written consent, verifying site and side, a sterile Chlora- prep was performed for the injection site.  C-arm guidance was used for placement of a 25-gauge needle into the dorsal wrist joint with 1% Lidocaine.  1 mL of Kenalog (40 mg) and 1 mL of lidocaine was injected under fluoroscopic guidance with good relief of pain. Patient tolerated the procedure well.  No complications.  Follow up instructions were given.      I, Sharonda Salter, a scribe, prepared the chart for today's encounter.     Branden Andrea, DO - Fellow    I have personally examined this patient and have reviewed the clinical presentation and progress note with the resident. I agree with the treatment plan as outlined. The plan was formulated with the resident on the day of the resident's dictation.   Lien Prajapati MD   Hand and Upper Extremity Specialist  [facility]        Hand / Upper Extremity Injection/Arthrocentesis: R carpal tunnel  Date/Time: 1/2/2020 11:00 AM  Performed by: Lien Prajapati MD  Authorized by: Lien Prajapati  MD Sandra     Indications:  Pain  Needle Size:  25 G  Guidance: landmark    Condition: carpal tunnel      Site:  R carpal tunnel  Medications:  20 mg triamcinolone 40 MG/ML; 2 mL lidocaine (PF) 1 %  Outcome:  Tolerated well, no immediate complications  Consent Given by:  Patient  Timeout: timeout called immediately prior to procedure    Prep: patient was prepped and draped in usual sterile fashion        Again, thank you for allowing me to participate in the care of your patient.      Sincerely,    Lien Prajapati MD

## 2020-01-02 NOTE — NURSING NOTE
Reason For Visit:   Chief Complaint   Patient presents with     Follow Up     Localized pain and locking, right wrist. Generalized pain, left wrist. W/C. Patient stated that she needs new braces for her wrists.        Primary MD: Matilde Quiñonez    Age: 69 year old      Pain Assessment  Patient Currently in Pain: Yes  Primary Pain Location: (Bilateral wrist)       Pinch force  R hand key force: 4.536 kg (10 lb)  L hand key force: 3.629 kg (8 lb)     force  R hand  level 2 force: 15.9 kg (35 lb)  L hand  level  2 force: 15.9 kg (35 lb)      Allergies   Allergen Reactions     Penicillins Deborah Pascual LPN

## 2020-01-02 NOTE — PROGRESS NOTES
"[facility]  Orthopedics  Lien Prajapati MD  2020     Name: Nadira Perez  MRN: [id number]  Age: 69 year old  : 1950  Referring provider: Referred Self     Chief Complaint: Wrist pain    Date of Injury: chronic    History of Present Illness:   Nadira Perez is a 69 year old, right handed female work comp patient who presents today for follow-up regarding bilateral wrist pain.  She was last seen on 18 at which time she had ongoing left > right wrist pain which limited her range of motion.   She was provided new wrist splints. Today, she presents with \"catching and locking\" of her right wrist when she extends it. She has also noticed an increase in the size of a dorsal bump in the same area where she feels it catch.    Review of Systems:   A 10-point review of systems was obtained and is negative except for as noted in the HPI.     Physical Examination:  General: Healthy appearing female. Affect appropriate. Normal gait. Alert and oriented to surroundings.   Right Upper Extremity: There is a 1.5 x 1.5 cm soft tissue mass over the dorsal radial side of the radiocarpal joint. There is moderate tenderness to palpation over the area. It does not feel well circumscribed like a ganglion cyst, appears not to move with the extensor tendons. There is a snapping palpated underneath the area of swelling with passive extension of the wrist that is reproducible.        Imaging:  Radiographs of the bilateral wrists - 3 views (2020)  3 views of the bilateral wrist show bilateral scaphoid excisions with 4 corner fusions. These fusions appear well healed with intact hardware. The right side lateral demonstrates an osteophyte off of the dorsal lip of the distal radius which was present in the x-ray taken in 2018. The left wrist demonstrates interval formation of a volar osteophyte compared to previous radiographs. There is some increased sclerosis of the radiocarpal joint compared to prior " study.    I have independently reviewed the above imaging studies; the results were discussed with the patient.     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 It was done in the office today and we will see her back in the office in 6 weeks to re-evaluate her right wrist. She may call and cancel the appointment if she feels that it has gotten much better.        PROCEDURE:  After written consent, verifying site and side, a sterile Chlora- prep was performed for the injection site.  C-arm guidance was used for placement of a 25-gauge needle into the dorsal wrist joint with 1% Lidocaine.  1 mL of Kenalog (40 mg) and 1 mL of lidocaine was injected under fluoroscopic guidance with good relief of pain. Patient tolerated the procedure well.  No complications.  Follow up instructions were given.      I, Sharonda Salter, a scribe, prepared the chart for today's encounter.     Branden Andrea, DO - Fellow    I have personally examined this patient and have reviewed the clinical presentation and progress note with the resident. I agree with the treatment plan as outlined. The plan was formulated with the resident on the day of the resident's dictation.   Lien Prajapati MD   Hand and Upper Extremity Specialist  [facility]        Hand / Upper Extremity Injection/Arthrocentesis: R carpal tunnel  Date/Time: 1/2/2020 11:00 AM  Performed by: Lien Prajapati MD  Authorized by: Lien Prajapati MD     Indications:  Pain  Needle Size:  25 G  Guidance: landmark    Condition: carpal tunnel      Site:  R carpal tunnel  Medications:  20 mg triamcinolone 40 MG/ML; 2 mL lidocaine (PF) 1 %  Outcome:  Tolerated well, no immediate complications  Consent Given by:  Patient  Timeout: timeout  called immediately prior to procedure    Prep: patient was prepped and draped in usual sterile fashion

## 2020-01-08 ENCOUNTER — PRE VISIT (OUTPATIENT)
Dept: NEUROLOGY | Facility: CLINIC | Age: 70
End: 2020-01-08

## 2020-01-08 NOTE — TELEPHONE ENCOUNTER
FUTURE VISIT INFORMATION      FUTURE VISIT INFORMATION:    Date: 3/11/2020    Time: 4pm    Location: [facility] [place]  REFERRAL INFORMATION:    Referring provider:  Self    Referring providers clinic:      Reason for visit/diagnosis  Balance issues     RECORDS REQUESTED FROM:       Clinic name Comments Records Status Imaging Status   [facility] Everywhere N/A

## 2020-01-09 ENCOUNTER — MYC MEDICAL ADVICE (OUTPATIENT)
Dept: INTERNAL MEDICINE | Facility: CLINIC | Age: 70
End: 2020-01-09

## 2020-01-09 DIAGNOSIS — R26.89 BALANCE PROBLEMS: Primary | ICD-10-CM

## 2020-01-09 DIAGNOSIS — R11.0 NAUSEA: ICD-10-CM

## 2020-01-10 RX ORDER — ONDANSETRON 8 MG/1
8 TABLET, FILM COATED ORAL EVERY 8 HOURS PRN
Qty: 30 TABLET | Refills: 1 | Status: SHIPPED | OUTPATIENT
Start: 2020-01-10 | End: 2020-07-16

## 2020-01-13 DIAGNOSIS — H69.90 DYSFUNCTION OF EUSTACHIAN TUBE, UNSPECIFIED LATERALITY: Primary | ICD-10-CM

## 2020-01-13 RX ORDER — ONDANSETRON 4 MG/1
TABLET, FILM COATED ORAL
Qty: 40 TABLET | Refills: 1 | Status: SHIPPED | OUTPATIENT
Start: 2020-01-13 | End: 2020-07-07

## 2020-01-21 NOTE — TELEPHONE ENCOUNTER
[facility] Call Center    Phone Message    May a detailed message be left on voicemail: yes    Reason for Call: Other: Pt needs handicap parking renewal. Please have it ready for her to  on Thursday.      Action Taken: Message routed to:  [facility] ([facility]): LUISA

## 2020-01-24 ENCOUNTER — OFFICE VISIT (OUTPATIENT)
Dept: ORTHOPEDICS | Facility: CLINIC | Age: 70
End: 2020-01-24
Payer: COMMERCIAL

## 2020-01-24 ENCOUNTER — ANCILLARY PROCEDURE (OUTPATIENT)
Dept: GENERAL RADIOLOGY | Facility: CLINIC | Age: 70
End: 2020-01-24
Attending: FAMILY MEDICINE
Payer: COMMERCIAL

## 2020-01-24 VITALS — WEIGHT: 186 LBS | BODY MASS INDEX: 29.19 KG/M2 | HEIGHT: 67 IN

## 2020-01-24 DIAGNOSIS — M25.552 LEFT HIP PAIN: ICD-10-CM

## 2020-01-24 DIAGNOSIS — M25.552 LEFT HIP PAIN: Primary | ICD-10-CM

## 2020-01-24 ASSESSMENT — MIFFLIN-ST. JEOR: SCORE: 1411.32

## 2020-01-24 NOTE — PATIENT INSTRUCTIONS
Trochanteric Bursitis / Gluteal Tendinopathy    WHAT IS TROCHANTERIC BURSITIS?    Bursitis is irritation or inflammation of the bursa. A bursa is a fluid-filled sac that acts as a cushion between tendons, bones, and skin. There is a bump on the outer side of the upper part of the thigh bone (femur) called the greater trochanter. The trochanteric bursa is located over the greater trochanter. When this bursa is inflamed it is called trochanteric bursitis.          WHAT IS THE CAUSE?     The trochanteric bursa may be inflamed by a group of muscles or tendons rubbing over the bursa and causing friction against the thigh bone. Your iliotibial band goes from the iliac crest of your pelvis down the outer side of your thigh and attaches just below the knee. A tight iliotibial band can lead to trochanteric bursitis. This injury can occur with running, walking, or bicycling, especially when the bicycle seat is too high.    Trochanteric bursitis may also be caused by a fall, by a spine disorder, by differences in the length of your legs, or as a complication of hip surgery.    WHAT ARE THE SYMPTOMS?    You have pain on the upper outer area of your thigh or on the side of your hip. The pain is worse when you walk, bicycle, or go up or down stairs. You have pain when you move your thigh bone and feel tenderness in the area over the greater trochanter.    HOW IS IT DIAGNOSED?    Your healthcare provider will ask about your symptoms and examine your hip and thigh.    HOW IS IT TREATED?    To treat this condition:    Put an ice pack, gel pack, or package of frozen vegetables wrapped in a cloth on the painful area every 3 to 4 hours for up to 20 minutes at a time until the pain goes away.  Take an anti-inflammatory medicine, such as ibuprofen, as directed by your provider. Nonsteroidal anti-inflammatory medicines (NSAIDs) may cause stomach bleeding and other problems. These risks increase with age. Read the label and take as  directed. Unless recommended by your healthcare provider, do not take for more than 10 days.  Follow your provider s instructions for doing exercises to help you recover.    While you are recovering from your injury you will need to change your sport or activity to one that does not make your condition worse. For example, you may need to swim instead of running or bicycling. If you are bicycling, you may need to lower your bicycle seat.    A bursa that is only mildly inflamed and has just started to hurt may improve within a few weeks. A bursa that is significantly inflamed and has been painful for a long time may take up to a few months to improve. You need to stop doing the activities that cause pain until your bursa has healed.    Follow your healthcare provider's instructions. Ask your provider:    How and when you will hear your test results  How long it will take to recover  What activities you should avoid and when you can return to your normal activities  How to take care of yourself at home  What symptoms or problems you should watch for and what to do if you have them  Make sure you know when you should come back for a checkup.    HOW CAN I HELP PREVENT TROCHANTERIC BURSITIS?    Trochanteric bursitis is best prevented by warming up properly and stretching the muscles on the outer side of your upper thigh.    Developed by [employer].  Published by [employer].  Copyright  2014 [employer] and/or one of its subsidiaries. All rights reserved.    You can do the first 3 stretches to begin stretching the muscles that run along the outside of your hip. You can do the strengthening exercises when the sharp pain lessens.    STRETCHING EXERCISES    Gluteal stretch: Lie on your back with both knees bent. Rest the ankle on your injured side over the knee of your other leg. Grasp the thigh of the leg on the uninjured side and pull toward your chest. You will feel a stretch along the buttocks on the injured  side and possibly along the outside of your hip. Hold the stretch for 15 to 30 seconds. Repeat 3 times.    Iliotibial band stretch, standing: Cross your uninjured leg in front of the other leg and bend down and reach toward the inside of your back foot. Do not bend your knees. Hold this position for 15 to 30 seconds. Return to the starting position. Repeat 3 times.  Iliotibial band stretch, side-leaning: Stand sideways near a wall with your injured side closest to the wall. Place a hand on the wall for support. Cross the leg farther from the wall over the other leg. Keep the foot closest to the wall flat on the floor. Lean your hips into the wall. Hold the stretch for 15 to 30 seconds. Repeat 3 times.    STRENGTHENING EXERCISES    Straight leg raise: Lie on your back with your legs straight out in front of you. Bend the knee on your uninjured side and place the foot flat on the floor. Tighten the thigh muscle on your injured side and lift your leg about 8 inches off the floor. Keep your leg straight and your thigh muscle tight. Slowly lower your leg back down to the floor. Do 2 sets of 15.    Prone hip extension: Lie on your stomach with your legs straight out behind you. Fold your arms under your head and rest your head on your arms. Draw your belly button in towards your spine and tighten your abdominal muscles. Tighten the buttocks and thigh muscles of the leg on your injured side and lift the leg off the floor about 8 inches. Keep your leg straight. Hold for 5 seconds. Then lower your leg and relax. Do 2 sets of 15.    Side-lying leg lift: Lie on your uninjured side. Tighten the front thigh muscles on your injured leg and lift that leg 8 to 10 inches (20 to 25 centimeters) away from the other leg. Keep the leg straight and lower it slowly. Do 2 sets of 15.    Wall squat with a ball: Stand with your back, shoulders, and head against a wall. Look straight ahead. Keep your shoulders relaxed and your feet 3 feet (90  centimeters) from the wall and shoulder's width apart. Place a soccer or basketball-sized ball behind your back. Keeping your back against the wall, slowly squat down to a 45-degree angle. Your thighs will not yet be parallel to the floor. Hold this position for 10 seconds and then slowly slide back up the wall. Repeat 10 times. Build up to 2 sets of 15.    Clam exercise: Lie on your uninjured side with your hips and knees bent and feet together. Slowly raise your top leg toward the ceiling while keeping your heels touching each other. Hold for 2 seconds and lower slowly. Do 2 sets of 15 repetitions.    Side plank: Lie on your side with your legs, hips, and shoulders in a straight line. Prop yourself up onto your forearm with your elbow directly under your shoulder. Lift your hips off the floor and balance on your forearm and the outside of your foot. Try to hold this position for 15 seconds and then slowly lower your hip to the ground. Switch sides and repeat. Work up to holding for 1 minute. This exercise can be made easier by starting with your knees and hips flexed toward your chest.    The plank: Lie on your stomach resting on our forearms. With your legs straight, lift your hips off the floor until they are in line with your shoulders. Support yourself on your forearms and toes. Hold this position for 15 seconds. (If this is too difficult, you can modify it by placing your knees on the floor.) Repeat 3 times. Work up to increasing your hold time to 30 to 60 seconds.    Developed by [employer].  Published by [employer].  Copyright  2014 [employer] and/or one of its subsidiaries. All rights reserved.

## 2020-01-24 NOTE — LETTER
"2020       RE: Nadira Perez  [address]  [facility] [address]     Dear Colleague,    Thank you for referring your patient, Nadira Perez, to the [facility] SPORTS AND ORTHOPAEDIC WALK IN CLINIC at [facility]. Please see a copy of my visit note below.    [facility]  Orthopedics  Willis Borjas, DO  2020     Name: Nadira Perez  MRN: [id number]  Age: 69 year old  : 1950  Referring provider: Referred Self     Chief Complaint: Pain of the Left Hip      Date of Injury: 2 weeks ago.    History of Present Illness:   Nadira Perez is a 69 year old female who presents today for evaluation of left hip pain. She reports slipping and falling on ice in her driveway onto her left hip and shoulder 2 weeks ago. Patient feels like she has been having difficulty with balance. Since her fall, her hip pain has persisted and is made worse with with pressure. Rates pain as a 4/10. currently treats her symptoms with Advil. Has a history of an L4 compression and is following with Dr. Ramirez in the near future.     Review of Systems:   A 10-point review of systems was obtained and is negative except for as noted in the HPI.     Physical Examination:  Height 1.702 m (5' 7\"), weight 84.4 kg (186 lb)  General  - normal appearance, in no obvious distress  CV  - normal peripheral perfusion  Pulm  - normal respiratory pattern, non-labored  Musculoskeletal - left hip  - stance: normal gait without limp  - inspection: Circular ringed pattern of ecchymosis at the lateral aspect of the left hip with central clearing.   - palpation: tenderness to palpation at the lateral hip overlying the greater trochanter.  - ROM: normal and painless hip flexion, extension, abduction, and adduction. External rotation to 60 deg. Internal rotation to 40 deg.  - strength: lower extremities 5/5 in all planes  - special tests:  (-) axial load  (+) Amalia at the lateral hip  Neuro  - patellar " and Achilles DTRs 2+ bilaterally, no sensory or motor deficit, grossly normal coordination, normal muscle tone  Skin  - no ecchymosis, erythema, warmth, or induration, no obvious rash  Psych  - interactive, appropriate, normal mood and affect    Imaging:   Radiographs of the left hip - 3 views (01/24/2020)  No acute osseus abnormalities.     I have independently reviewed the above imaging studies; the results were discussed with the patient.     Assessment:    69 year old female with lateral hip pain after a fall on her driveway two weeks ago. X-rays unremarkable and no exam findings to suggest IA hip pathology. Suspect traumatic trochanteric bursitis/gluteus medius tendon contusion.    Diagnosis:  1. Traumatic left lateral hip contusion.    Plan:     HEP provided.     Activity modifications discussed. She should avoid bumping or lying on her left hip.     She may treat her pain with ice.    Analgesics as needed    Follow-up prn if no improvement in next 3-4 weeks.    Scribe Disclosure:  I, Devyn Robbins, am serving as a scribe to document services personally performed by Willis Borjas DO at this visit, based upon the provider's statements to me. All documentation has been reviewed by the aforementioned provider prior to being entered into the official medical record.          SPORTS & ORTHOPEDIC WALK-IN VISIT 1/24/2020    Primary Care Physician: Dr. Quiñonez    Has severe osteoporosis - previously completed chemotherapy - hyperparathyroidism.  Mentions that she has lost 2 in in height rapidly. Also noticing some increased balance issue. Bone health has detrimental effects on inner ear function.   Has been to a balance clinic and attempted some exercise.  Needs documentation for balance PT so that insurance will accept the PT.    Already has an L4 compression (going to see ortho for this issue - recommended to see Dr. Ramirez). This has been aggravated since the fall.    1/13/20 - was in her driveway, slid on the ice  and fell mostly on her left hip.  Mentions that the ecchymosis has lingered longer than she had expected.    Most of the pain is isolated to the greater trochanter. Concerned for bone break.    Reason for visit:     What part of your body is injured / painful?  left hip    What caused the injury /pain? Fall    How long ago did your injury occur or pain begin? several weeks ago    What are your most bothersome symptoms? Pain    How would you characterize your symptom?  aching and dull    What makes your symptoms better? Rest and Ice    What makes your symptoms worse? Other: palpation,     Have you been previously seen for this problem? No    Medical History:    Any recent changes to your medical history? No    Any new medication prescribed since last visit? No    Have you had surgery on this body part before? No    Social History:    Occupation: RN at [employer]    Handedness: Right    Exercise: 3-4 days/week    Review of Systems:    Do you have fever, chills, weight loss? No    Do you have any vision problems? No    Do you have any chest pain or edema? No    Do you have any shortness of breath or wheezing?  No    Do you have stomach problems? No    Do you have any numbness or focal weakness? No    Do you have diabetes? No    Do you have problems with bleeding or clotting? No    Do you have an rashes or other skin lesions? No       Again, thank you for allowing me to participate in the care of your patient.      Sincerely,    Willis Borjas, DO

## 2020-01-24 NOTE — PROGRESS NOTES
SPORTS & ORTHOPEDIC WALK-IN VISIT 1/24/2020    Primary Care Physician: Dr. Quiñonez    Has severe osteoporosis - previously completed chemotherapy - hyperparathyroidism.  Mentions that she has lost 2 in in height rapidly. Also noticing some increased balance issue. Bone health has detrimental effects on inner ear function.   Has been to a balance clinic and attempted some exercise.  Needs documentation for balance PT so that insurance will accept the PT.    Already has an L4 compression (going to see ortho for this issue - recommended to see Dr. Ramirez). This has been aggravated since the fall.    1/13/20 - was in her driveway, slid on the ice and fell mostly on her left hip.  Mentions that the ecchymosis has lingered longer than she had expected.    Most of the pain is isolated to the greater trochanter. Concerned for bone break.    Reason for visit:     What part of your body is injured / painful?  left hip    What caused the injury /pain? Fall    How long ago did your injury occur or pain begin? several weeks ago    What are your most bothersome symptoms? Pain    How would you characterize your symptom?  aching and dull    What makes your symptoms better? Rest and Ice    What makes your symptoms worse? Other: palpation,     Have you been previously seen for this problem? No    Medical History:    Any recent changes to your medical history? No    Any new medication prescribed since last visit? No    Have you had surgery on this body part before? No    Social History:    Occupation: RN at [employer]    Handedness: Right    Exercise: 3-4 days/week    Review of Systems:    Do you have fever, chills, weight loss? No    Do you have any vision problems? No    Do you have any chest pain or edema? No    Do you have any shortness of breath or wheezing?  No    Do you have stomach problems? No    Do you have any numbness or focal weakness? No    Do you have diabetes? No    Do you have problems with bleeding or clotting?  No    Do you have an rashes or other skin lesions? No

## 2020-01-24 NOTE — TELEPHONE ENCOUNTER
DIAGNOSIS: Other chronic back pain [M54.9, G89.29], per Dr. Perez, MRI back in Dec. Referred by Khushbu Louise MD   APPOINTMENT DATE: Jan 28, 2020    NOTES STATUS DETAILS   OFFICE NOTE from referring provider Internal See 12/20/19 [employer] message with Dr. Louise  11/11/19 office visit   OFFICE NOTE from other specialist N/A    DISCHARGE SUMMARY from hospital N/A    DISCHARGE REPORT from the ER N/A    OPERATIVE REPORT N/A    MEDICATION LIST Internal    IMPLANT RECORD/STICKER N/A    LABS     CBC/DIFF N/A    CULTURES N/A    INJECTIONS DONE IN RADIOLOGY N/A    MRI Internal 12/20/19   CT SCAN N/A    XRAYS (IMAGES & REPORTS) Internal 11/30/18, 11/15/17, 10/27/16...   TUMOR     PATHOLOGY  Slides & report N/A

## 2020-01-24 NOTE — PROGRESS NOTES
"[facility]  Orthopedics  Willis Borjas,   2020     Name: Nadira Perez  MRN: [id number]  Age: 69 year old  : 1950  Referring provider: Referred Self     Chief Complaint: Pain of the Left Hip      Date of Injury: 2 weeks ago.    History of Present Illness:   Nadira Perez is a 69 year old female who presents today for evaluation of left hip pain. She reports slipping and falling on ice in her driveway onto her left hip and shoulder 2 weeks ago. Patient feels like she has been having difficulty with balance. Since her fall, her hip pain has persisted and is made worse with with pressure. Rates pain as a 4/10. currently treats her symptoms with Advil. Has a history of an L4 compression and is following with Dr. Ramirez in the near future.     Review of Systems:   A 10-point review of systems was obtained and is negative except for as noted in the HPI.     Physical Examination:  Height 1.702 m (5' 7\"), weight 84.4 kg (186 lb)  General  - normal appearance, in no obvious distress  CV  - normal peripheral perfusion  Pulm  - normal respiratory pattern, non-labored  Musculoskeletal - left hip  - stance: normal gait without limp  - inspection: Circular ringed pattern of ecchymosis at the lateral aspect of the left hip with central clearing.   - palpation: tenderness to palpation at the lateral hip overlying the greater trochanter.  - ROM: normal and painless hip flexion, extension, abduction, and adduction. External rotation to 60 deg. Internal rotation to 40 deg.  - strength: lower extremities 5/5 in all planes  - special tests:  (-) axial load  (+) Amalia at the lateral hip  Neuro  - patellar and Achilles DTRs 2+ bilaterally, no sensory or motor deficit, grossly normal coordination, normal muscle tone  Skin  - no ecchymosis, erythema, warmth, or induration, no obvious rash  Psych  - interactive, appropriate, normal mood and affect    Imaging:   Radiographs of the left hip - 3 views (2020)  No " acute osseus abnormalities.     I have independently reviewed the above imaging studies; the results were discussed with the patient.     Assessment:    69 year old female with lateral hip pain after a fall on her driveway two weeks ago. X-rays unremarkable and no exam findings to suggest IA hip pathology. Suspect traumatic trochanteric bursitis/gluteus medius tendon contusion.    Diagnosis:  1. Traumatic left lateral hip contusion.    Plan:     HEP provided.     Activity modifications discussed. She should avoid bumping or lying on her left hip.     She may treat her pain with ice.    Analgesics as needed    Follow-up prn if no improvement in next 3-4 weeks.      Scribe Disclosure:  I, Devyn Robbins, am serving as a scribe to document services personally performed by Willis Borjas DO at this visit, based upon the provider's statements to me. All documentation has been reviewed by the aforementioned provider prior to being entered into the official medical record.

## 2020-01-28 ENCOUNTER — ANCILLARY PROCEDURE (OUTPATIENT)
Dept: GENERAL RADIOLOGY | Facility: CLINIC | Age: 70
End: 2020-01-28
Attending: ORTHOPAEDIC SURGERY
Payer: OTHER MISCELLANEOUS

## 2020-01-28 ENCOUNTER — PRE VISIT (OUTPATIENT)
Dept: ORTHOPEDICS | Facility: CLINIC | Age: 70
End: 2020-01-28

## 2020-01-28 ENCOUNTER — OFFICE VISIT (OUTPATIENT)
Dept: ORTHOPEDICS | Facility: CLINIC | Age: 70
End: 2020-01-28
Attending: INTERNAL MEDICINE
Payer: COMMERCIAL

## 2020-01-28 ENCOUNTER — TELEPHONE (OUTPATIENT)
Dept: OTOLARYNGOLOGY | Facility: CLINIC | Age: 70
End: 2020-01-28

## 2020-01-28 VITALS — BODY MASS INDEX: 29.19 KG/M2 | HEIGHT: 67 IN | WEIGHT: 186 LBS

## 2020-01-28 DIAGNOSIS — M41.50 DEGENERATIVE SCOLIOSIS IN ADULT PATIENT: ICD-10-CM

## 2020-01-28 DIAGNOSIS — M54.16 LUMBAR RADICULOPATHY: Primary | ICD-10-CM

## 2020-01-28 DIAGNOSIS — M54.50 LUMBAR PAIN: Primary | ICD-10-CM

## 2020-01-28 ASSESSMENT — ENCOUNTER SYMPTOMS
SKIN CHANGES: 0
EXERCISE INTOLERANCE: 1
DISTURBANCES IN COORDINATION: 1
SORE THROAT: 0
DYSURIA: 0
JOINT SWELLING: 0
TROUBLE SWALLOWING: 1
MEMORY LOSS: 0
STIFFNESS: 1
FEVER: 0
SWOLLEN GLANDS: 0
LIGHT-HEADEDNESS: 1
FATIGUE: 1
HOT FLASHES: 0
HOARSE VOICE: 0
PARALYSIS: 0
EYE REDNESS: 0
DIARRHEA: 0
WHEEZING: 0
JAUNDICE: 0
SHORTNESS OF BREATH: 0
LOSS OF CONSCIOUSNESS: 0
BLOOD IN STOOL: 0
HEARTBURN: 1
MUSCLE WEAKNESS: 1
DECREASED CONCENTRATION: 0
DOUBLE VISION: 0
BOWEL INCONTINENCE: 0
INCREASED ENERGY: 0
NAIL CHANGES: 1
NERVOUS/ANXIOUS: 0
FLANK PAIN: 0
NECK PAIN: 1
DIFFICULTY URINATING: 0
TASTE DISTURBANCE: 0
DEPRESSION: 0
HALLUCINATIONS: 0
LEG PAIN: 0
DIZZINESS: 1
SLEEP DISTURBANCES DUE TO BREATHING: 0
NUMBNESS: 1
POLYPHAGIA: 0
MYALGIAS: 1
ARTHRALGIAS: 1
CHILLS: 0
EYE IRRITATION: 0
SNORES LOUDLY: 1
ORTHOPNEA: 0
CONSTIPATION: 0
SINUS PAIN: 0
BRUISES/BLEEDS EASILY: 0
SYNCOPE: 0
BACK PAIN: 1
SPUTUM PRODUCTION: 0
INSOMNIA: 1
DECREASED APPETITE: 0
DECREASED LIBIDO: 1
SPEECH CHANGE: 0
TINGLING: 1
HYPOTENSION: 0
NAUSEA: 1
PANIC: 0
WEAKNESS: 1
POSTURAL DYSPNEA: 0
SINUS CONGESTION: 0
NECK MASS: 0
EYE PAIN: 0
COUGH: 0
SMELL DISTURBANCE: 0
DYSPNEA ON EXERTION: 0
EYE WATERING: 0
HEADACHES: 0
PALPITATIONS: 1
HEMOPTYSIS: 0
TREMORS: 0
MUSCLE CRAMPS: 1
HEMATURIA: 0
NIGHT SWEATS: 0
ALTERED TEMPERATURE REGULATION: 0
BLOATING: 1
ABDOMINAL PAIN: 0
POOR WOUND HEALING: 0
HYPERTENSION: 1
VOMITING: 1
RECTAL PAIN: 0
POLYDIPSIA: 0
COUGH DISTURBING SLEEP: 0
WEIGHT GAIN: 0
SEIZURES: 0
WEIGHT LOSS: 0

## 2020-01-28 ASSESSMENT — MIFFLIN-ST. JEOR: SCORE: 1411.32

## 2020-01-28 NOTE — LETTER
1/28/2020     RE: Nadira Perez  [address]  [place] [id number]     Dear Colleague,    Thank you for referring your patient, Nadira Perez, to the [facility] at [facility]. Please see a copy of my visit note below.    Spine Surgery Consultation    REFERRING PHYSICIAN: Khushbu Louise   PRIMARY CARE PHYSICIAN: Matilde Quiñonez           Chief Complaint:   Consult (low back pain discuss options )      History of Present Illness:  Symptom Profile Including: location of symptoms, onset, severity, exacerbating/alleviating factors, previous treatments:        Nadira Perez is a 69 year old female who presents today for evaluation of worsening chronic back pain.  The pain is worse when she stands and tries to walk.  It does limit her ability to be mobile.  She feels like you can only walk a block or 2.  She denies any radicular or claudicatory leg symptoms.  She treated with ibuprofen and occasional Vicodin.  She is seeing a therapist for balance issues and has follow-up scheduled both with ENT as well as a balance specialist.  She has not done dedicated lumbar therapy.  She is not yet tried bracing.  No injections at this point.         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     Thyroid disease             Past Surgical History:     Past Surgical History:   Procedure Laterality Date     ABDOMEN SURGERY      ovarian cyst, mesh     ARTHRODESIS WRIST       ARTHRODESIS WRIST  2/14/2013    Procedure:  ARTHRODESIS WRIST;  left wrist scaphoid excision, four bone fusion, iliac crest bone graft  ( Mac with block);  Surgeon: Av Mendez MD;  Location: [facility]     BIOPSY      skin, vaginal     BIOPSY OF SKIN LESION       CATARACT IOL, RT/LT Right 03/13/2018     CATARACT IOL, RT/LT Left 02/20/2018     COLONOSCOPY  12/24/2013    Procedure: COMBINED COLONOSCOPY, SINGLE BIOPSY/POLYPECTOMY BY BIOPSY;  COLONOSCOPY;  Surgeon: Dom Alvarez MD;  Location:  GI     COSMETIC SURGERY       ESOPHAGOSCOPY, GASTROSCOPY, DUODENOSCOPY (EGD), COMBINED N/A 11/23/2016    Procedure: COMBINED ESOPHAGOSCOPY, GASTROSCOPY, DUODENOSCOPY (EGD);  Surgeon: Quinten Feliciano MD;  Location:  GI     EXTERNAL EAR SURGERY      right     EYE SURGERY      radial keratomy     GRAFT BONE FROM ILIAC CREST  2/14/2013    Procedure: GRAFT BONE FROM ILIAC CREST;  mac with block and local infilitration;  Surgeon: Av Mendez MD;  Location:  OR     HC BREATH HYDROGEN TEST N/A 10/14/2016    Procedure: HYDROGEN BREATH TEST;  Surgeon: Cheri Barron MD;  Location:  GI     HERNIA REPAIR      UMBILICAL     HERNIA REPAIR      umbilical age 18 mos.     HERNIA REPAIR       HERNIORRHAPHY UMBILICAL  1/31/1954     HYSTERECTOMY TOTAL ABDOMINAL  5/3/00     HYSTERECTOMY TOTAL ABDOMINAL  5/3/2000     MASTECTOMY      PROPHYLACTIC RIGHT BREAST     MASTECTOMY MODIFIED RADICAL      LEFT BREAST     MASTECTOMY MODIFIED RADICAL      bilateral; right breast prophylactic     PARATHYROIDECTOMY  9/23/04    R SUPERIOR     PARATHYROIDECTOMY  9/23/2004    parathyroid resection, subtotal     REMOVE HARDWARE HAND  9/24/2013    Procedure: REMOVE HARDWARE HAND;  Left Hand Screw Removal        RHINOPLASTY  12/31/1985     RHINOPLASTY  12/31/1985     thyr proc skin closed cosmetic manner by subcuticular stitch  1/23/2009     TONSILLECTOMY  3/1/68     TONSILLECTOMY  3/1/1968     WRIST SURGERY      wrist arthrodesis            Social History:     Social  History     Tobacco Use     Smoking status: Never Smoker     Smokeless tobacco: Never Used   Substance Use Topics     Alcohol use: Yes     Comment: Rare to occasional            Family History:     Family History   Problem Relation Age of Onset     Heart Disease Father         AAA     Hypertension Father      Neurologic Disorder Mother         Anuerysm of Cerebral Artery, Dementia     Diabetes Mother      Thyroid Disease Mother         ,     Cerebrovascular Disease Mother      Dementia Mother      Osteoporosis Mother      Diabetes Maternal Grandmother      Asthma Maternal Grandmother      Diabetes Maternal Aunt         x2     Melanoma Maternal Aunt      Glaucoma Maternal Aunt      Circulatory Brother         Perihperal Neurophathy     Dementia Other      Cancer Other         malignant melanoma     Hypertension Other      Hypertension Other      Cerebrovascular Disease Other      Cerebrovascular Disease Other      Obesity Other      Respiratory Other      Chronic Obstructive Pulmonary Disease Maternal Grandfather         father     Asthma Maternal Grandfather      Breast Cancer Cousin      Cancer Other      Diabetes Other      Asthma Other      Macular Degeneration No family hx of      Coronary Artery Disease No family hx of      Hyperlipidemia No family hx of      Kidney Disease No family hx of      Thrombosis No family hx of      Arthritis No family hx of      Depression No family hx of      Mental Illness No family hx of      Substance Abuse No family hx of      Cystic Fibrosis No family hx of      Early Death No family hx of      Coronary Artery Disease Early Onset No family hx of      Heart Failure No family hx of      Bleeding Diathesis No family hx of      Ovarian Cancer No family hx of      Uterine Cancer No family hx of      Prostate Cancer No family hx of      Colorectal Cancer No family hx of      Pancreatic Cancer No family hx of      Lung Cancer No family hx of      Other Cancer No family hx of       "Autoimmune Disease No family hx of      Unknown/Adopted No family hx of      Genetic Disorder No family hx of             Allergies:     Allergies   Allergen Reactions     Penicillins Hives            Medications:     Current Outpatient Medications   Medication     Acetaminophen (APAP 500 PO)     amLODIPine (NORVASC) 10 MG tablet     anastrozole (ARIMIDEX) 1 MG tablet     atorvastatin (LIPITOR) 40 MG tablet     cholecalciferol 2000 units CAPS     Coenzyme Q10 (COQ10) 200 MG CAPS     gabapentin (NEURONTIN) 300 MG capsule     glucosamine-chondroitin 500-400 MG CAPS per capsule     HYDROcodone-acetaminophen (NORCO) 5-325 MG tablet     ibuprofen (ADVIL/MOTRIN) 600 MG tablet     ketoconazole (NIZORAL) 2 % external shampoo     levothyroxine (SYNTHROID/LEVOTHROID) 112 MCG tablet     lisinopril (PRINIVIL/ZESTRIL) 40 MG tablet     LORazepam (ATIVAN) 2 MG tablet     metoprolol succinate ER (TOPROL-XL) 50 MG 24 hr tablet     Multiple Vitamin (MULTI-VITAMIN) per tablet     Omega-3 Fatty Acids (OMEGA-3 FISH OIL PO)     ondansetron (ZOFRAN) 4 MG tablet     ondansetron (ZOFRAN) 8 MG tablet     order for DME     order for DME     potassium chloride ER (K-DUR/KLOR-CON M) 20 MEQ CR tablet     sulfamethoxazole-trimethoprim (BACTRIM DS/SEPTRA DS) 800-160 MG tablet     terbinafine (LAMISIL AT) 1 % external cream     triamcinolone (KENALOG) 0.1 % external ointment     triamterene-HCTZ (MAXZIDE-25) 37.5-25 MG tablet     Vaginal Lubricant (REPHRESH) GEL     VOLTAREN 1 % topical gel     Current Facility-Administered Medications   Medication     lidocaine (PF) (XYLOCAINE) 1 % injection 2 mL     lidocaine 1 % injection 4 mL     triamcinolone (KENALOG-40) injection 20 mg             Review of Systems:     A 10 point ROS was performed and reviewed. Specific responses to these questions are noted at the end of the document.         Physical Exam:   Vitals: Ht 1.702 m (5' 7\")   Wt 84.4 kg (186 lb)   BMI 29.13 kg/m     Constitutional: awake, " alert, cooperative, no apparent distress, appears stated age.    Eyes: The sclera are white.  Ears, Nose, Throat: The trachea is midline.  Psychiatric: The patient has a normal affect.  Respiratory: breathing non-labored  Cardiovascular: The extremities are warm and perfused.  Skin: no obvious rashes or lesions.  Musculoskeletal, Neurologic, and Spine:            Lumbar Spine:    Appearance - No gross stepoffs or deformities    Motor -     L2-3: Hip flexion 5/5 R and 5/5 L strength          L3/4:  Knee extension R 5/5 and L 5/5 strength         L4/5:  Foot dorsiflexion R 5/5 L 5/5 and       EHL dorsiflexion R 4/5 L 4/5 strength         S1:  Plantarflexion/Peroneal Muscles  R 5/5 and L 5/5 strength    Sensation: intact to light touch L3-S1 distribution BLE      Neurologic:      REFLEXES Left Right                  Patella 1+ 1+   Ankle jerk 1+ 1+   Babinski No upgoing great toe No upgoing great toe   Clonus 0 beats 0 beats     Hip Exam:  No pain with hip log roll and no tenderness over the greater trochanters.    Alignment:  Patient stands with a positivel standing sagittal balance.           Imaging:   We ordered and independently reviewed new radiographs at this clinic visit. The results were discussed with the patient.  Findings include:    December 20, 2019 lumbar MRI shows severe left foraminal stenosis at L5-S1 due to broad disc bulge and facet joint cyst in the foramen.  Moderate to severe L4-5 and L3-4 as well as L2-3 stenosis due to disc space height loss again noted on the left side is severe L4-5 and L5-S1 foraminal stenosis     standing AP lateral flexion-extension lumbar radiographs January 28, 2020 show grade 1 spondylolisthesis L5-S1 and severe degenerative changes at essentially every visualized level with kyphosis through the thoracolumbar junction and a degenerative coronal plane deformity measuring 16 degrees between L5 and L2             Assessment and Plan:   Assessment:  69 year old female with  degenerative lumbar scoliosis and back pain.       Plan:  1. We discussed conservative options for management.  She is not interested in surgery at this time and I think given her level of disability that she is wise to try and avoid surgery and make it a last resort.  For conservative care, I would recommend she try a lumbar corset type brace to wear when she is up and more active as this may give her a feeling of stability.  I recommended Tylenol and anti-inflammatories as she is able.  I recommended a trial of lumbar physical therapy to focus on stationary bicycle, low impact cardio and core strengthening.  I recommended trying to minimize narcotics.  If the pain becomes severe in the future she could potentially call us and we would order L4-5 and L5-S1 right-sided facet injections given that the right side of her back is more painful.  If she has any questions in the future I be happy to see her back on an as-needed basis.      Respectfully,  Serge Ramirez MD  Spine Surgery  [facility]      Answers for HPI/ROS submitted by the patient on 1/28/2020   General Symptoms: Yes  Skin Symptoms: Yes  HENT Symptoms: Yes  EYE SYMPTOMS: Yes  HEART SYMPTOMS: Yes  LUNG SYMPTOMS: Yes  INTESTINAL SYMPTOMS: Yes  URINARY SYMPTOMS: Yes  GYNECOLOGIC SYMPTOMS: Yes  BREAST SYMPTOMS: No  SKELETAL SYMPTOMS: Yes  BLOOD SYMPTOMS: Yes  NERVOUS SYSTEM SYMPTOMS: Yes  MENTAL HEALTH SYMPTOMS: Yes  Fever: No  Loss of appetite: No  Weight loss: No  Weight gain: No  Fatigue: Yes  Night sweats: No  Chills: No  Increased stress: No  Excessive hunger: No  Excessive thirst: No  Feeling hot or cold when others believe the temperature is normal: No  Loss of height: No  Post-operative complications: No  Surgical site pain: No  Hallucinations: No  Change in or Loss of Energy: No  Hyperactivity: No  Confusion: No  Changes in hair: No  Changes in moles/birth marks: No  Itching: Yes  Rashes: No  Changes in nails: Yes  Acne: No  Hair  in places you don't want it: No  Change in facial hair: No  Warts: No  Non-healing sores: No  Scarring: No  Flaking of skin: No  Color changes of hands/feet in cold : No  Sun sensitivity: No  Skin thickening: No  Ear pain: No  Ear discharge: No  Hearing loss: Yes  Tinnitus: Yes  Nosebleeds: No  Congestion: No  Sinus pain: No  Trouble swallowing: Yes   Voice hoarseness: No  Mouth sores: Yes  Sore throat: No  Tooth pain: No  Gum tenderness: No  Bleeding gums: No  Change in taste: No  Change in sense of smell: No  Dry mouth: Yes  Hearing aid used: Yes  Neck lump: No  Eye pain: No  Vision loss: No  Dry eyes: Yes  Watery eyes: No  Eye bulging: No  Double vision: No  Flashing of lights: No  Spots: No  Floaters: Yes  Redness: No  Crossed eyes: No  Tunnel Vision: No  Yellowing of eyes: No  Eye irritation: No  Cough: No  Sputum or phlegm: No  Coughing up blood: No  Difficulty breating or shortness of breath: No  Snoring: Yes  Wheezing: No  Difficulty breathing on exertion: No  Nighttime Cough: No  Difficulty breathing when lying flat: No  Chest pain or pressure: No  Fast or irregular heartbeat: Yes  Pain in legs with walking: No  Trouble breathing while lying down: No  Fingers or toes appear blue: No  High blood pressure: Yes  Low blood pressure: No  Fainting: No  Murmurs: Yes  Pacemaker: No  Varicose veins: Yes  Edema or swelling: Yes  Wake up at night with shortness of breath: No  Light-headedness: Yes  Exercise intolerance: Yes  Heart burn or indigestion: Yes  Nausea: Yes  Vomiting: Yes  Abdominal pain: No  Bloating: Yes  Constipation: No  Diarrhea: No  Blood in stool: No  Black stools: No  Rectal or Anal pain: No  Fecal incontinence: No  Yellowing of skin or eyes: No  Vomit with blood: No  Change in stools: No  Trouble holding urine or incontinence: Yes  Pain or burning: No  Trouble starting or stopping: No  Increased frequency of urination: No  Blood in urine: No  Decreased frequency of urination: No  Frequent nighttime  urination: No  Flank pain: No  Difficulty emptying bladder: No  Back pain: Yes  Muscle aches: Yes  Neck pain: Yes  Swollen joints: No  Joint pain: Yes  Bone pain: No  Muscle cramps: Yes  Muscle weakness: Yes  Joint stiffness: Yes  Bone fracture: No  Anemia: No  Swollen glands: No  Easy bleeding or bruising: No  Trouble with coordination: Yes  Dizziness or trouble with balance: Yes  Fainting or black-out spells: No  Memory loss: No  Headache: No  Seizures: No  Speech problems: No  Tingling: Yes  Tremor: No  Weakness: Yes  Difficulty walking: Yes  Paralysis: No  Numbness: Yes  Bleeding or spotting between periods: No  Heavy or painful periods: No  Irregular periods: No  Vaginal discharge: No  Hot flashes: No  Vaginal dryness: Yes  Genital ulcers: No  Reduced libido: Yes  Painful intercourse: Yes  Difficulty with sexual arousal: Yes  Post-menopausal bleeding: No  Nervous or Anxious: No  Depression: No  Trouble sleeping: Yes  Trouble thinking or concentrating: No  Mood changes: No  Panic attacks: No    Again, thank you for allowing me to participate in the care of your patient.      Sincerely,    Serge Ramirez MD

## 2020-01-28 NOTE — TELEPHONE ENCOUNTER
FUTURE VISIT INFORMATION      FUTURE VISIT INFORMATION:    Date: 3/31/20    Time: 11 AM    Location: [facility]  REFERRAL INFORMATION:    Referring provider:  Matilde Quiñonez NP    Referring providers clinic:  [facility]    Reason for visit/diagnosis: Dizziness    RECORDS REQUESTED FROM:       Clinic name Comments Records Status Imaging Status   ealth ([facility]) 3/11/20 - NEURO OV with Dr. Antoine  1/9/20 - MyChart Referral from [facility]  11/11/19 - PCC OV with Dr. Medel  11/27/17 - ENT OV with Dr. Romero (dx: Hearing Loss) [facility] 10/2/19 to 11/12/19 - PT OVs with Shraddha Hansen PT [facility] - Audiology 3/11/20 - VNG and Rotary Chair  2/27/20 - AUD OV with oPlo Fabian  11/27/17 - Audiogram [facility] - Imaging 8/1/17 - US Head Neck Soft Tissue  4/21/17 - MRI Brain W/WO [facility] PACs

## 2020-01-28 NOTE — NURSING NOTE
"Reason For Visit:   Chief Complaint   Patient presents with     Consult     low back pain discuss options        Primary MD: Matilde Quiñonez  Ref. MD: Dani   Date of surgery: none   Type of surgery: none .  Smoker: No  Request smoking cessation information: No    Ht 1.702 m (5' 7\")   Wt 84.4 kg (186 lb)   BMI 29.13 kg/m           Oswestry (JOHN) Questionnaire    No flowsheet data found.         Neck Disability Index (NDI) Questionnaire    No flowsheet data found.                Promis 10 Assessment    No flowsheet data found.             Quang Schroeder, ATC  "

## 2020-01-28 NOTE — PROGRESS NOTES
Spine Surgery Consultation    REFERRING PHYSICIAN: Khushbu Louise   PRIMARY CARE PHYSICIAN: Matilde Quiñonez           Chief Complaint:   Consult (low back pain discuss options )      History of Present Illness:  Symptom Profile Including: location of symptoms, onset, severity, exacerbating/alleviating factors, previous treatments:        Nadira Perez is a 69 year old female who presents today for evaluation of worsening chronic back pain.  The pain is worse when she stands and tries to walk.  It does limit her ability to be mobile.  She feels like you can only walk a block or 2.  She denies any radicular or claudicatory leg symptoms.  She treated with ibuprofen and occasional Vicodin.  She is seeing a therapist for balance issues and has follow-up scheduled both with ENT as well as a balance specialist.  She has not done dedicated lumbar therapy.  She is not yet tried bracing.  No injections at this point.         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     Thyroid disease             Past Surgical History:     Past Surgical History:   Procedure Laterality Date     ABDOMEN SURGERY      ovarian cyst, mesh     ARTHRODESIS WRIST       ARTHRODESIS WRIST  2/14/2013    Procedure: ARTHRODESIS WRIST;  left wrist scaphoid excision, four bone fusion, iliac crest bone graft  ( Mac with block);  Surgeon: Av Mendez MD;  Location: [facility]     BIOPSY      skin, vaginal     BIOPSY OF SKIN LESION       CATARACT IOL, RT/LT Right 03/13/2018     CATARACT IOL, RT/LT Left  02/20/2018     COLONOSCOPY  12/24/2013    Procedure: COMBINED COLONOSCOPY, SINGLE BIOPSY/POLYPECTOMY BY BIOPSY;  COLONOSCOPY;  Surgeon: Dom Alvarez MD;  Location:  GI     COSMETIC SURGERY       ESOPHAGOSCOPY, GASTROSCOPY, DUODENOSCOPY (EGD), COMBINED N/A 11/23/2016    Procedure: COMBINED ESOPHAGOSCOPY, GASTROSCOPY, DUODENOSCOPY (EGD);  Surgeon: Quinten Feliciano MD;  Location:  GI     EXTERNAL EAR SURGERY      right     EYE SURGERY      radial keratomy     GRAFT BONE FROM ILIAC CREST  2/14/2013    Procedure: GRAFT BONE FROM ILIAC CREST;  mac with block and local infilitration;  Surgeon: Av Mendez MD;  Location:  OR      BREATH HYDROGEN TEST N/A 10/14/2016    Procedure: HYDROGEN BREATH TEST;  Surgeon: Cheri Barron MD;  Location:  GI     HERNIA REPAIR      UMBILICAL     HERNIA REPAIR      umbilical age 18 mos.     HERNIA REPAIR       HERNIORRHAPHY UMBILICAL  1/31/1954     HYSTERECTOMY TOTAL ABDOMINAL  5/3/00     HYSTERECTOMY TOTAL ABDOMINAL  5/3/2000     MASTECTOMY      PROPHYLACTIC RIGHT BREAST     MASTECTOMY MODIFIED RADICAL      LEFT BREAST     MASTECTOMY MODIFIED RADICAL      bilateral; right breast prophylactic     PARATHYROIDECTOMY  9/23/04    R SUPERIOR     PARATHYROIDECTOMY  9/23/2004    parathyroid resection, subtotal     REMOVE HARDWARE HAND  9/24/2013    Procedure: REMOVE HARDWARE HAND;  Left Hand Screw Removal        RHINOPLASTY  12/31/1985     RHINOPLASTY  12/31/1985     thyr proc skin closed cosmetic manner by subcuticular stitch  1/23/2009     TONSILLECTOMY  [birth year]     TONSILLECTOMY  3/1/1968     WRIST SURGERY      wrist arthrodesis            Social History:     Social History     Tobacco Use     Smoking status: Never Smoker     Smokeless tobacco: Never Used   Substance Use Topics     Alcohol use: Yes     Comment: Rare to occasional            Family History:     Family History   Problem Relation Age of Onset     Heart Disease Father         AAA      Hypertension Father      Neurologic Disorder Mother         Anuerysm of Cerebral Artery, Dementia     Diabetes Mother      Thyroid Disease Mother         ,     Cerebrovascular Disease Mother      Dementia Mother      Osteoporosis Mother      Diabetes Maternal Grandmother      Asthma Maternal Grandmother      Diabetes Maternal Aunt         x2     Melanoma Maternal Aunt      Glaucoma Maternal Aunt      Circulatory Brother         Perihperal Neurophathy     Dementia Other      Cancer Other         malignant melanoma     Hypertension Other      Hypertension Other      Cerebrovascular Disease Other      Cerebrovascular Disease Other      Obesity Other      Respiratory Other      Chronic Obstructive Pulmonary Disease Maternal Grandfather         father     Asthma Maternal Grandfather      Breast Cancer Cousin      Cancer Other      Diabetes Other      Asthma Other      Macular Degeneration No family hx of      Coronary Artery Disease No family hx of      Hyperlipidemia No family hx of      Kidney Disease No family hx of      Thrombosis No family hx of      Arthritis No family hx of      Depression No family hx of      Mental Illness No family hx of      Substance Abuse No family hx of      Cystic Fibrosis No family hx of      Early Death No family hx of      Coronary Artery Disease Early Onset No family hx of      Heart Failure No family hx of      Bleeding Diathesis No family hx of      Ovarian Cancer No family hx of      Uterine Cancer No family hx of      Prostate Cancer No family hx of      Colorectal Cancer No family hx of      Pancreatic Cancer No family hx of      Lung Cancer No family hx of      Other Cancer No family hx of      Autoimmune Disease No family hx of      Unknown/Adopted No family hx of      Genetic Disorder No family hx of             Allergies:     Allergies   Allergen Reactions     Penicillins Hives            Medications:     Current Outpatient Medications   Medication     Acetaminophen (APAP 500  "PO)     amLODIPine (NORVASC) 10 MG tablet     anastrozole (ARIMIDEX) 1 MG tablet     atorvastatin (LIPITOR) 40 MG tablet     cholecalciferol 2000 units CAPS     Coenzyme Q10 (COQ10) 200 MG CAPS     gabapentin (NEURONTIN) 300 MG capsule     glucosamine-chondroitin 500-400 MG CAPS per capsule     HYDROcodone-acetaminophen (NORCO) 5-325 MG tablet     ibuprofen (ADVIL/MOTRIN) 600 MG tablet     ketoconazole (NIZORAL) 2 % external shampoo     levothyroxine (SYNTHROID/LEVOTHROID) 112 MCG tablet     lisinopril (PRINIVIL/ZESTRIL) 40 MG tablet     LORazepam (ATIVAN) 2 MG tablet     metoprolol succinate ER (TOPROL-XL) 50 MG 24 hr tablet     Multiple Vitamin (MULTI-VITAMIN) per tablet     Omega-3 Fatty Acids (OMEGA-3 FISH OIL PO)     ondansetron (ZOFRAN) 4 MG tablet     ondansetron (ZOFRAN) 8 MG tablet     order for DME     order for DME     potassium chloride ER (K-DUR/KLOR-CON M) 20 MEQ CR tablet     sulfamethoxazole-trimethoprim (BACTRIM DS/SEPTRA DS) 800-160 MG tablet     terbinafine (LAMISIL AT) 1 % external cream     triamcinolone (KENALOG) 0.1 % external ointment     triamterene-HCTZ (MAXZIDE-25) 37.5-25 MG tablet     Vaginal Lubricant (REPHRESH) GEL     VOLTAREN 1 % topical gel     Current Facility-Administered Medications   Medication     lidocaine (PF) (XYLOCAINE) 1 % injection 2 mL     lidocaine 1 % injection 4 mL     triamcinolone (KENALOG-40) injection 20 mg             Review of Systems:     A 10 point ROS was performed and reviewed. Specific responses to these questions are noted at the end of the document.         Physical Exam:   Vitals: Ht 1.702 m (5' 7\")   Wt 84.4 kg (186 lb)   BMI 29.13 kg/m    Constitutional: awake, alert, cooperative, no apparent distress, appears stated age.    Eyes: The sclera are white.  Ears, Nose, Throat: The trachea is midline.  Psychiatric: The patient has a normal affect.  Respiratory: breathing non-labored  Cardiovascular: The extremities are warm and perfused.  Skin: no obvious " rashes or lesions.  Musculoskeletal, Neurologic, and Spine:            Lumbar Spine:    Appearance - No gross stepoffs or deformities    Motor -     L2-3: Hip flexion 5/5 R and 5/5 L strength          L3/4:  Knee extension R 5/5 and L 5/5 strength         L4/5:  Foot dorsiflexion R 5/5 L 5/5 and       EHL dorsiflexion R 4/5 L 4/5 strength         S1:  Plantarflexion/Peroneal Muscles  R 5/5 and L 5/5 strength    Sensation: intact to light touch L3-S1 distribution BLE      Neurologic:      REFLEXES Left Right                  Patella 1+ 1+   Ankle jerk 1+ 1+   Babinski No upgoing great toe No upgoing great toe   Clonus 0 beats 0 beats     Hip Exam:  No pain with hip log roll and no tenderness over the greater trochanters.    Alignment:  Patient stands with a positivel standing sagittal balance.           Imaging:   We ordered and independently reviewed new radiographs at this clinic visit. The results were discussed with the patient.  Findings include:    December 20, 2019 lumbar MRI shows severe left foraminal stenosis at L5-S1 due to broad disc bulge and facet joint cyst in the foramen.  Moderate to severe L4-5 and L3-4 as well as L2-3 stenosis due to disc space height loss again noted on the left side is severe L4-5 and L5-S1 foraminal stenosis     standing AP lateral flexion-extension lumbar radiographs January 28, 2020 show grade 1 spondylolisthesis L5-S1 and severe degenerative changes at essentially every visualized level with kyphosis through the thoracolumbar junction and a degenerative coronal plane deformity measuring 16 degrees between L5 and L2             Assessment and Plan:   Assessment:  69 year old female with degenerative lumbar scoliosis and back pain.       Plan:  1. We discussed conservative options for management.  She is not interested in surgery at this time and I think given her level of disability that she is wise to try and avoid surgery and make it a last resort.  For conservative care, I  would recommend she try a lumbar corset type brace to wear when she is up and more active as this may give her a feeling of stability.  I recommended Tylenol and anti-inflammatories as she is able.  I recommended a trial of lumbar physical therapy to focus on stationary bicycle, low impact cardio and core strengthening.  I recommended trying to minimize narcotics.  If the pain becomes severe in the future she could potentially call us and we would order L4-5 and L5-S1 right-sided facet injections given that the right side of her back is more painful.  If she has any questions in the future I be happy to see her back on an as-needed basis.      Respectfully,  Serge Ramirez MD  Spine Surgery  [facility]      Answers for HPI/ROS submitted by the patient on 1/28/2020   General Symptoms: Yes  Skin Symptoms: Yes  HENT Symptoms: Yes  EYE SYMPTOMS: Yes  HEART SYMPTOMS: Yes  LUNG SYMPTOMS: Yes  INTESTINAL SYMPTOMS: Yes  URINARY SYMPTOMS: Yes  GYNECOLOGIC SYMPTOMS: Yes  BREAST SYMPTOMS: No  SKELETAL SYMPTOMS: Yes  BLOOD SYMPTOMS: Yes  NERVOUS SYSTEM SYMPTOMS: Yes  MENTAL HEALTH SYMPTOMS: Yes  Fever: No  Loss of appetite: No  Weight loss: No  Weight gain: No  Fatigue: Yes  Night sweats: No  Chills: No  Increased stress: No  Excessive hunger: No  Excessive thirst: No  Feeling hot or cold when others believe the temperature is normal: No  Loss of height: No  Post-operative complications: No  Surgical site pain: No  Hallucinations: No  Change in or Loss of Energy: No  Hyperactivity: No  Confusion: No  Changes in hair: No  Changes in moles/birth marks: No  Itching: Yes  Rashes: No  Changes in nails: Yes  Acne: No  Hair in places you don't want it: No  Change in facial hair: No  Warts: No  Non-healing sores: No  Scarring: No  Flaking of skin: No  Color changes of hands/feet in cold : No  Sun sensitivity: No  Skin thickening: No  Ear pain: No  Ear discharge: No  Hearing loss: Yes  Tinnitus: Yes  Nosebleeds:  No  Congestion: No  Sinus pain: No  Trouble swallowing: Yes   Voice hoarseness: No  Mouth sores: Yes  Sore throat: No  Tooth pain: No  Gum tenderness: No  Bleeding gums: No  Change in taste: No  Change in sense of smell: No  Dry mouth: Yes  Hearing aid used: Yes  Neck lump: No  Eye pain: No  Vision loss: No  Dry eyes: Yes  Watery eyes: No  Eye bulging: No  Double vision: No  Flashing of lights: No  Spots: No  Floaters: Yes  Redness: No  Crossed eyes: No  Tunnel Vision: No  Yellowing of eyes: No  Eye irritation: No  Cough: No  Sputum or phlegm: No  Coughing up blood: No  Difficulty breating or shortness of breath: No  Snoring: Yes  Wheezing: No  Difficulty breathing on exertion: No  Nighttime Cough: No  Difficulty breathing when lying flat: No  Chest pain or pressure: No  Fast or irregular heartbeat: Yes  Pain in legs with walking: No  Trouble breathing while lying down: No  Fingers or toes appear blue: No  High blood pressure: Yes  Low blood pressure: No  Fainting: No  Murmurs: Yes  Pacemaker: No  Varicose veins: Yes  Edema or swelling: Yes  Wake up at night with shortness of breath: No  Light-headedness: Yes  Exercise intolerance: Yes  Heart burn or indigestion: Yes  Nausea: Yes  Vomiting: Yes  Abdominal pain: No  Bloating: Yes  Constipation: No  Diarrhea: No  Blood in stool: No  Black stools: No  Rectal or Anal pain: No  Fecal incontinence: No  Yellowing of skin or eyes: No  Vomit with blood: No  Change in stools: No  Trouble holding urine or incontinence: Yes  Pain or burning: No  Trouble starting or stopping: No  Increased frequency of urination: No  Blood in urine: No  Decreased frequency of urination: No  Frequent nighttime urination: No  Flank pain: No  Difficulty emptying bladder: No  Back pain: Yes  Muscle aches: Yes  Neck pain: Yes  Swollen joints: No  Joint pain: Yes  Bone pain: No  Muscle cramps: Yes  Muscle weakness: Yes  Joint stiffness: Yes  Bone fracture: No  Anemia: No  Swollen glands: No  Easy  bleeding or bruising: No  Trouble with coordination: Yes  Dizziness or trouble with balance: Yes  Fainting or black-out spells: No  Memory loss: No  Headache: No  Seizures: No  Speech problems: No  Tingling: Yes  Tremor: No  Weakness: Yes  Difficulty walking: Yes  Paralysis: No  Numbness: Yes  Bleeding or spotting between periods: No  Heavy or painful periods: No  Irregular periods: No  Vaginal discharge: No  Hot flashes: No  Vaginal dryness: Yes  Genital ulcers: No  Reduced libido: Yes  Painful intercourse: Yes  Difficulty with sexual arousal: Yes  Post-menopausal bleeding: No  Nervous or Anxious: No  Depression: No  Trouble sleeping: Yes  Trouble thinking or concentrating: No  Mood changes: No  Panic attacks: No

## 2020-01-28 NOTE — TELEPHONE ENCOUNTER
1. Have you noticed any changes in hearing? No  2. Do you have ringing, buzzing, or other sounds in your ears or head, this is also referred to as Tinnitus? Sometimes: sometimes  3. When and where was your last hearing test? [facility] [place]  4. Do you feel lightheaded or foggy? Yes  5. Do you have a spinning sensation? Yes  6. Is there any specific position that can bring on dizziness? Movement/getting up and turning  7. Does looking up cause dizziness? No  8. Does getting in and our of bed cause dizziness? Sometimes: to the left  9. Does turning over in bed increase or cause dizziness? No  10. Does bending over cause dizziness? Yes  11. Is there anything that you can do to prevent the dizziness? Go slower/touch things to move  12. Has the dizziness gotten better with time? No  13. Have you seen Physical Therapy for dizziness? (Please indicate clinic and as much of the location as possible): Yes, If yes, where? [facility] if yes, who?   14. Are you being referred to a specific physician? No  15. Have you been evaluated/treated for your dizziness at any other location?  (If yes,obtian as much clinic/provider/locaiton as possible) No. (If yes answer the following questions:)   Have you seen any ENT, Neurology, or other providers for these symptoms?             Yes, If yes, where? if yes, who?    Have you had any balance or Audiology testing? No Have you had an MRI or CT scan of your head or neck? Yes, If yes, where? MRI- [facility] if yes, who?     Would you like to receive your Release of Information by mail or e-mail?  e-mail

## 2020-01-29 DIAGNOSIS — R42 DIZZINESS: Primary | ICD-10-CM

## 2020-01-29 NOTE — TELEPHONE ENCOUNTER
"Patient is referred for: Dizziness    Referred to ENT by: Matilde Soto NP    Most recent Imaging of Head and/or Neck: MRI on 4/21/2017 revealed \"1. No evidence of metastatic disease. 2. 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 Patient currently utilizes bilateral amplification.    Other providers seen: Vestibular physical therapy evaluation on 10/2/2019 revealed \"patient reports she has a balance problem overall and has had several falls over the last year - estimates a couple times per month at first but now it is less frequent. Patient notes it is worse with turning and bending over, notes a little bit of balance dizziness. Patient reports [facility] [place] and lost her balance - notes it may not be related to just her peripheral neuropathy. Patient has a handicap sticker because she is scared of falling in the winter. Patient has a DEXA scan in November. Patient notes she will fall over nothing, or will get her feet tangled. Patient fell in 4/2018 and twisted her right knee - found to have a meniscus tear which she has been doing PT on and off for since 12/2018.\"     Other testing performed: NA    Additional medical history: Per physical therapy note on 10/2/2019, \"Patient has a history of inflammatory breast cancer s/p radiation therapy, hypertension, osteoporosis, nephrolithiasis, and hypothyroidism.\" Per oncology note from 11/11/2019, initial dx of breast cancer was 06/2007, patient underwent radiation treatment, and \"completed 10 years of endocrine therapy with Arimidex and then tamoxifen. In 11/2017, she then went back on Arimidex for a prolonged extended therapy as per the MA17 trial. \"    I will request orders for hearing test, videonystagmography (VNG) and rotational " chair testing prior to their appt with Dr. Rick Nissen on 3/31/2020. *may be subject to change pending physician review.    Yamini Agudelo. MIGEL-A  Clinical Audiologist   [place] #[id number]

## 2020-01-31 ENCOUNTER — TELEPHONE (OUTPATIENT)
Dept: AUDIOLOGY | Facility: CLINIC | Age: 70
End: 2020-01-31

## 2020-02-19 DIAGNOSIS — M41.50 DEGENERATIVE SCOLIOSIS IN ADULT PATIENT: ICD-10-CM

## 2020-02-19 DIAGNOSIS — M54.16 LUMBAR RADICULOPATHY: ICD-10-CM

## 2020-02-27 ENCOUNTER — OFFICE VISIT (OUTPATIENT)
Dept: AUDIOLOGY | Facility: CLINIC | Age: 70
End: 2020-02-27
Payer: COMMERCIAL

## 2020-02-27 DIAGNOSIS — R42 DIZZINESS: ICD-10-CM

## 2020-02-27 DIAGNOSIS — M54.16 LUMBAR RADICULOPATHY: ICD-10-CM

## 2020-02-27 DIAGNOSIS — M41.50 DEGENERATIVE SCOLIOSIS IN ADULT PATIENT: ICD-10-CM

## 2020-02-27 DIAGNOSIS — H90.3 ASYMMETRIC SNHL (SENSORINEURAL HEARING LOSS): Primary | ICD-10-CM

## 2020-02-27 NOTE — PROGRESS NOTES
AUDIOLOGY REPORT    SUBJECTIVE:  Nadira Perez is a 69 year old female who was seen in Audiology at the [facility] for audiologic evaluation and hearing aid check, referred by Referred Self. The patient has been seen for hearing assessment in this facility in November of 2017 and results indicated normal sloping to moderate sensorineural hearing loss bilaterally with the right ear slightly poorer than the left ear (5-15 dB) multiple frequencies. The patient was fit with binaural Widex Beyond 330 Fusion  in the canal hearing aids 2/5/2018 and reports that the hearing aids are still very beneficial as programmed andt hat she does not suspect a further decline in hearing since last testing or an asymmetry in hearing between ears. The patient referred herself for the audiologic assessment due to concerns for imbalance,dizziness and nausea occurring on a daily basis when the patient turns or when she tips her head forward or backward. The patient also reports a few instances of vertigo. The patient had self-referred to Physical Therapy for these symptoms but has not yet found relief from the issues after a few sessions of therapy. The patient reports that she self-referred for an MRI of the cerebellum with no abnormal findings. The patient is now scheduled for a balance assessment and to see Dr Clau Antoine which was also a self-referral. These appointments are scheduled for March 11, 2020.       OBJECTIVE:  Fall Risk Screen:  1. Have you fallen two or more times in the past year? Yes   2. Have you fallen and had an injury in the past year? No    Abuse Screening:  Do you feel unsafe at home or work/school? No  Do you feel threatened by someone? No  Does anyone try to keep you from having contact with others, or doing things outside of your home? No  Physical signs of abuse present? No    Otoscopic exam indicates impacted cerumen in the right ear removed with  lighted curette without incident prior to testing.     Pure Tone Thresholds assessed using conventional audiometry with fair to good  reliability from 250-8000 Hz bilaterally using circumaural headphones and reassessed using insert earphones     RIGHT:  Borderline normal sloping to moderately severe sensorineural hearing loss; 10-20 dB decrease most frequencies.     LEFT:    Normal sloping to moderate sensorineural hearing loss; 10 dB decrease 1000, 3000 and 8000 Hz  *5-20 dB asymmetry noted most frequencies, right ear poorer    Tympanogram:    RIGHT: normal eardrum mobility    LEFT:   normal eardrum mobility    Reflexes (reported by stimulus ear):  RIGHT: Ipsilateral is present at normal levels  RIGHT: Contralateral is present at normal levels  LEFT:   Ipsilateral is present at normal levels  LEFT:   Contralateral is present at normal levels    Speech Reception Threshold:    RIGHT: 30 dB HL    LEFT:   15 dB HL  Word Recognition Score:     RIGHT: 96% at 70 dB HL using NU-6 recorded word list.    LEFT:   92% at 60 dB HL using NU-6 recorded word list.    The patient's hearing aids were deep-cleaned and assessed and are functioning to specifications. The patient reports that the hearing aids seem loud after the cleaning and so she does not desire adjustments today.    ASSESSMENT:  Normal to moderate sensorineural hearing loss for the left ear and borderline normal to moderately severe sensorineural hearing loss for the right ear with symmetric speech understanding abilities and normal middle ear status bilaterally. The patient's hearing aids were cleaned and assessed and are functioning well and the patient does not desire adjustments to the devices today.    PLAN:    It is recommended that the patient follow up with Ear, Nose and Throat in order to consult about concerns regarding daily dizziness and also to address the asymmetry in hearing between ears.  Please call this clinic with questions regarding these results  or recommendations.    *This is a no-charge hearing aid visit as the patient's hearing aids are currently under warranty.      Yamini Ro.  Licensed Audiologist  [place] #[id number]

## 2020-03-11 ENCOUNTER — OFFICE VISIT (OUTPATIENT)
Dept: AUDIOLOGY | Facility: CLINIC | Age: 70
End: 2020-03-11
Payer: COMMERCIAL

## 2020-03-11 DIAGNOSIS — R42 DIZZINESS AND GIDDINESS: Primary | ICD-10-CM

## 2020-03-11 DIAGNOSIS — R42 DIZZINESS: ICD-10-CM

## 2020-03-11 NOTE — PROGRESS NOTES
AUDIOLOGY REPORT-BALANCE ASSESSMENT    SUBJECTIVE: Nadira Perez, 69 year old, was seen in Audiology at the [facility] on 3/11/2020, for videonystagmography (VNG) and rotational chair testing, referred by Rick Nissen, M.D. The patient reports concerns for imbalance, dizziness and nausea occurring on a daily basis when the patient turns or when she tips her head forward or backward. She says the dizziness (patient is unsure if this is true spinning or not) lasts for maybe up to 2 hours, and, the continued nausea and motion sickness can last for up to 24 hours. She feels her symptoms have become more frequent over that past 2 years. She has a long history of motion sickness, and this have worsened as she has gotten older as well. She reports when she walks she tends to veer to the left. The patient has seen physical therapy for these symptoms but has not yet found relief from the issues after a few sessions of therapy. Patient notes she will fall over nothing, or will get her feet tangled. Patient fell in 4/2018 and twisted her right knee - found to have a meniscus tear which she has been doing PT on and off for since 12/2018. She reports that she falls about 3-4 times per months, but has very controlled falls as is able to catch herself, with no sustained injuries in the past year. She reports occasional tension headaches, but denies a history of migraines, head trauma, anxiety/depression. She reports having peripheral neuropathy with numbness and pain in both feet. She reports being on several medications that have dizziness as a possible side effect, and wonders if her symptoms could be related to this. She reports having bilateral cataract surgery about 2 years ago. She denies blurred vision or double vision. She denies dizziness with coughing or sneezing, or with loud sounds.    Hearing evaluations have revealed left normal sloping to moderate sensorineural  hearing loss, and right borderline normal sloping to moderately-severe sensorineural hearing loss. Patient was fit with binaural amplification on 2/5/2018, and perceives benefit from using them. She reports rare episodic bilateral tinnitus, that may be more frequent in the left ear. She denies ear pain, pressure, or drainage. She reports having surgery on the right pinna as a child.     Other medical history of note includes breast cancer s/p radiation, hypertension, osteoporosis, nephrolithiasis, and hypothyroidism. Nadira has not taken any antivestibular medications in the past 48 hours.    OBJECTIVE:  Abuse Screening:  Do you feel unsafe at home or work/school? No  Do you feel threatened by someone? No  Does anyone try to keep you from having contact with others, or doing things outside of your home? No  Physical signs of abuse present? No    Dizziness Handicap Inventory (DHI): 34/100 Mild perceived impairment    Rotational chair testing:   Sinusoidal harmonic acceleration test:  Spontaneous nystagmus: Absent  Phase: Normal at 0.01, 0.02, 0.08, and 0.32 Hz  Gain: Normal at 0.01, 0.02, 0.08, and 0.32 Hz  Symmetry: Normal at 0.01, 0.02, 0.08, and 0.32 Hz  Spectral Purity: Normal at 0.01, 0.02, 0.08, and 0.32 Hz  Overall rotational chair test: Normal at 0.01, 0.02, 0.08, and 0.32 Hz    Videonystagmography (VNG) testing:  Prescreening:  Tympanograms: Normal eardrum mobility bilaterally. Note: this test is completed to determine the status of the middle ear before irrigations are completed.  Ocular range of motion and ocular counter roll: Normal  Cross/cover: Normal  Head Thrust: Negative     Note: Patient is experiencing nausea when she arrives for testing. Any note of symptoms is an increase in her baseline nausea.    Nystagmus Tests:  Gaze-Horizontal with fixation:   Center: Normal   Right: Normal   Left: Normal. Patient reported double vision  Gaze-Vertical with fixation:   Up: Normal. Patient reported double  vision   Down: Normal  Gaze with fixation denied   Center: Normal   Right: Normal   Left: Minimal 2 deg/sec left beating (likely endpoint/non-significant)   Up: Normal  High Frequency Headshake:   Horizontal: Negative for nystagmus. Patient reported slight increase in nausea   Vertical: Negative. Patient reported increased nausea    [place]-Hallpike Head Right: Negative for nystagmus & increased symptoms   [place]Hallpike Head Left: Negative for nystagmus & increased symptoms   Roll Test Head Right: Negative for nystagmus & increased symptoms   Roll Test Head Left: Negative for nystagmus & increased symptoms     Positional Testing:  Positionals: Supine: Normal  Positionals: Body Right: Normal  Positionals: Body Left: 2 deg/sec left beating  Positionals: Pre-Caloric: Normal    Oculomotor Tests:  Saccades: Normal upon repeat  Anti-saccades: Normal; Patient able to perform task  Pursuit: Normal upon repeat. Patient reported double vision as stimulus moved to the left    Calorics :  (Tested at 44 degrees and 30 degrees Celsius for 30 seconds for warm and cool water, respectively):  Right Warm Eye Speed: 35 degrees per second right beating  Left Warm Eye Speed: 45 degrees per second left beating  Right Cool Eye Speed: 18 degrees per second left beating  Left Cool Eye Speed: 22 degrees per second right beating  Difference between ear: 12% right hypofunction. (Greater than 25% considered clinically significant.)  Fixation Index: 0.06 Normal  Overall caloric test: Normal    ASSESSMENT:  1. There were no indications of central vestibular system involvement noted on today's exam.     2. There were no indications of peripheral vestibular system involvement noted on today's exam.     PLAN:  Follow-up with Dr. Nissen for medical management.  Please call this clinic at [phone] with questions regarding these results or recommendations.       Yamini Byrnes.  Licensed Audiologist  [place] # [id number]

## 2020-03-13 NOTE — PROGRESS NOTES
Outpatient Physical Therapy Discharge Note     Patient: Nadira Perez  : 1950    Beginning/End Dates of Reporting Period:  10/2/19 to 3/13/2020    Referring Provider: Matilde Quiñonez APRN CNP    Therapy Diagnosis: Impaired functional balance and mobility     Client Self Report: Patient arrived 12 minutes late for her appointment. Patient reports she had her oncology visit yesterday and they are going to do an MRI on her back because her DEXA showed L4 compression. Patient has not taken her vicodin yet this morning because she had a flat tire. Patient reports if she gets up and starts to turn, she gets dizzy about 50% of the time. Patient notes her symptoms are about the same, she does her exercises if her back is ok but has not tried them since last week.  3/13/2020: Patient failed to schedule and attend further follow up appointments, discharge at this time due to noncompliance.     Goals:  Goal Identifier HEP   Goal Description Patient will demonstrate understanding and compliance to her HEP for continued wellbeing upon discharge from skilled physical therapy.   Target Date 19   Date Met   19   Progress: IND at last session part of the time.     Goal Identifier FGA   Goal Description Patient will complete the FGA with a score of 28/30 to demonstrate improved balance and decreased risk for falls.   Target Date 19   Date Met      Progress: Progress not assessed at time of last visit.     Goal Identifier mCTSIB   Goal Description Patient will maintain her balance with feet together and eyes closed on foam for 30 seconds to demonstrate improved vestibular function and balance with low vision for safety with going to the bathroom at night.   Target Date 19   Date Met      Progress: Patient able to maintain balance for 14 seconds at last attempt, improved from 5 seconds at initial evaluation but still not at normal baseline.      Goal Identifier Falls    Goal Description Patient  will deny falls in the home or community in the last month to demonstrate increased safety and independence with functional mobility.   Target Date 11/27/19   Date Met   11/12/19   Progress: Goal met     Goal Identifier DVA   Goal Description Patient will complete DVA testing with a loss of 4 lines or less between static and 2 Hz head movement to demonstrate improved gaze stability for return to activities without limitation.   Target Date 11/27/19   Date Met  10/10/19   Progress: Goal met     Progress Toward Goals:   Progress this reporting period: Patient demonstrates progress toward her goals but never returned to physical therapy, discharge at this time due to noncompliance.     Plan:  Discharge from therapy.    Discharge:    Reason for Discharge: No further expectation of progress.  Patient has failed to schedule further appointments.    Discharge Plan: Patient to continue home program.

## 2020-03-16 ENCOUNTER — MYC MEDICAL ADVICE (OUTPATIENT)
Dept: INTERNAL MEDICINE | Facility: CLINIC | Age: 70
End: 2020-03-16

## 2020-03-31 ENCOUNTER — PRE VISIT (OUTPATIENT)
Dept: OTOLARYNGOLOGY | Facility: CLINIC | Age: 70
End: 2020-03-31

## 2020-03-31 ENCOUNTER — VIRTUAL VISIT (OUTPATIENT)
Dept: OTOLARYNGOLOGY | Facility: CLINIC | Age: 70
End: 2020-03-31
Attending: NURSE PRACTITIONER
Payer: COMMERCIAL

## 2020-03-31 DIAGNOSIS — R42 DIZZINESS: Primary | ICD-10-CM

## 2020-03-31 DIAGNOSIS — H90.3 BILATERAL HIGH FREQUENCY SENSORINEURAL HEARING LOSS: ICD-10-CM

## 2020-03-31 NOTE — PROGRESS NOTES
"Nadira Perez is a 69 year old female who is being evaluated via a billable telephone visit.      The patient has been notified of following:     \"This telephone visit will be conducted via a call between you and your physician/provider. We have found that certain health care needs can be provided without the need for a physical exam.  This service lets us provide the care you need with a short phone conversation.  If a prescription is necessary we can send it directly to your pharmacy.  If lab work is needed we can place an order for that and you can then stop by our lab to have the test done at a later time.    If during the course of the call the physician/provider feels a telephone visit is not appropriate, you will not be charged for this service.\"     Patient has given verbal consent for Telephone visit?  Yes    Nadira Perez     I have reviewed and updated the patient's Past Medical History, Social History, Family History and Medication List.    ALLERGIES  Penicillins    Additional provider notes:  Has had dizziness and issues with balance for several years. She is a breast cancer survivor and on medications for that which can cause dizziness. Also has peripheral neuropathy.  She describes dizziness as a lightheadedness with often nausea. She often will notice it with quick head motion or getting up quickly. She is able to function well, just needs to be careful when it is happening. She is able to drive and is still working.  From an ear standpoint, she does have a high frequency SNHL and has hearing aids since about 2018. She doesn't notice any hearing fluctuation. She rarely has any tinnitus and it is not associated with any increase balance issues. There is no pain or drainage in her ears. She denies any dysphagia, hoarseness or facial paraesthesias. She did have parathyroid surgery in the past, with some temporary swallowing difficulties, that has healed. She did have a normal MRI in 2017. She " did a trial of PT in the past with limited help. Recent rotational chair and VNG was completed. Both are normal.   Discussed her symptoms and with normal vestibular studies, I don't feel the ear is connected at all with her balance issues. She also has no associated auditory symptoms when issues are occurring.  Suggested trial of Vestibular rehab through PT. She will check with oncologist and pharmacist as far as medication cause for her her lightheadedness.  Will also consider neurology referral if symptoms worsen or become more problematic    Assessment/Plan:  1. Dizziness: Feel not related to ear with normal balance testing and lack of any associated auditory symptoms. With consider Vestibular rehab through PT. Neurology assessment if symptoms worsen.  2. SNHL: Continue with hearing aids to maximize hearing, protect ears from any noise exposure.    Phone call duration: 25 minutes    Rick L. Nissen, MD

## 2020-04-22 ENCOUNTER — VIRTUAL VISIT (OUTPATIENT)
Dept: NEUROLOGY | Facility: CLINIC | Age: 70
End: 2020-04-22
Payer: COMMERCIAL

## 2020-04-22 DIAGNOSIS — G62.9 PERIPHERAL POLYNEUROPATHY: ICD-10-CM

## 2020-04-22 DIAGNOSIS — R42 DIZZINESS: ICD-10-CM

## 2020-04-22 DIAGNOSIS — G93.9 BRAINSTEM LESION: ICD-10-CM

## 2020-04-22 DIAGNOSIS — R26.89 IMBALANCE: Primary | ICD-10-CM

## 2020-04-22 ASSESSMENT — PAIN SCALES - GENERAL: PAINLEVEL: MODERATE PAIN (4)

## 2020-04-22 NOTE — LETTER
"4/22/2020       RE: Nadira Perez  [address]  [facility] [address]     Dear Colleague,    Thank you for referring your patient, Nadira Perez, to the [facility] NEUROLOGY at [facility]. Please see a copy of my visit note below.    Nadira Perez is a 69 year old female who is being evaluated via a billable video visit.      The patient has been notified of following:     \"This video visit will be conducted via a call between you and your physician/provider. We have found that certain health care needs can be provided without the need for an in-person physical exam.  This service lets us provide the care you need with a video conversation.  If a prescription is necessary we can send it directly to your pharmacy.  If lab work is needed we can place an order for that and you can then stop by our lab to have the test done at a later time.    Video visits are billed at different rates depending on your insurance coverage.  Please reach out to your insurance provider with any questions.    If during the course of the call the physician/provider feels a video visit is not appropriate, you will not be charged for this service.\"    Patient has given verbal consent for Video visit? YES     How would you like to obtain your AVS? [facility]      Patient would like the video invitation sent by: TEXT [phone]     Will anyone else be joining your video visit? NO        Video Start Time: 2:10pm     The clinical encounter between myself and Nadira Perez was performed utilizing a real-time video connection between my location and the patient's location, which was confirmed during the encounter.  Consent was obtained from the patient to perform this clinical encounter via telehealth modality. The visit was done between [facility] and [facility] because of video difficulties on [facility].     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  The patient is a nurse and an excellent historian.      The patient was diagnosed with breast cancer (Stage 3A) in 2007 and underwent bilateral mastectomies that year. This was followed by treatment with Arimadex for 5 years then Tamoxifen for 5 years, and then Arimadex again for 2 years.       Sometime in 2017 she gradually started noticing some balance issues.  This is most apparent when she is trying to get up and starts to walk.  She can stumble if she doesn't have something to brace herself against, like a wall. This does not seem to be blood pressure related as she has checked her blood pressure during the symptomatic periods.  It generally happens more at the beginning of the movement but she denies stiffness, rigidity, or freezing of gait.   Walking longer doesn't make it worse.  She notices that she veers to either side but that she tends to tip more to the left.  She does walk slower now for safety reasons and she does falls about 2-3x.month.  In the last month she has started to use a 4-wheeled walker when she has to carry things while walking but she does not normally use any gait assist devices for regular ambulation.     During this time she also developed some head movement sensitivity and a greater propensity for motion sickness.  She does have a lifelong history of motion sickness. This is problematic even when moving her head from side to side.  It is not particularly worse when she is going into  and out of bed but tilting her head back will make her feel nauseated.  She doesn't experience overt spinning vertigo but more of a sense that she *might* start experiencing vertigo.   She went to [facility] for vestibular therapy and noticed that head movement exercises made her nauseated.      She had had an MRI of the brain in 2017 to be sure that her breast cancer had not spread there.  It was negative for metastatic cancer spread but there was a signal abnormality on the right side of the medulla that was read as being a potential capillary telangiectasia.     She does have bilateral hearing aids and experiences very brief but very rare tinnitus.  There is no pressure in the ears.  She denies diplopia, facial numbness, clumsiness, and any lateralized motor or sensory problems. She has had some trouble swallowing after two rounds of parathyroid surgery ([id number]).  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  She is otherwise swallowing without problems and her speech is fine.     She has also suffered from progressive peripheral neuropathy which is experienced as pain, numbness, and tingling in the toes.  She can feel the temperature of bath water.  The feet are actually more sensitive. These symptoms started before chemotherapy and chemotherapy it didn't make it worse.  This has been gradually progressive, however.  Walking in the dark is not particularly bothersome and she does not have difficulty walking on soft ground.  Climbing steps would be a problem, however.      She denies headaches or visual problems.  She had cataract surgery which helped vision and she does wear glasses.      Finally, she reports losing about 2 inches of height from osteoporosis and hyperparathyroidism.  She has an L4 compression fracture and has been diagnosed with scoliosis.  She does work with a [employer]od[employer] specialist and a great .     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     Thyroid disease         PAST SURGICAL HISTORY:  Past Surgical History:   Procedure Laterality Date     ABDOMEN SURGERY      ovarian cyst, mesh     ARTHRODESIS WRIST       ARTHRODESIS WRIST  2/14/2013    Procedure: ARTHRODESIS WRIST;  left wrist scaphoid excision, four bone fusion, iliac crest bone graft  ( Mac with block);  Surgeon: Av Mendez MD;  Location: [facility]     BIOPSY      skin, vaginal     BIOPSY OF SKIN LESION       CATARACT IOL, RT/LT Right 03/13/2018     CATARACT IOL, RT/LT Left 02/20/2018     COLONOSCOPY  12/24/2013    Procedure: COMBINED COLONOSCOPY, SINGLE BIOPSY/POLYPECTOMY BY BIOPSY;  COLONOSCOPY;  Surgeon: Dom Alvarez MD;  Location:  GI     COSMETIC SURGERY       ESOPHAGOSCOPY, GASTROSCOPY, DUODENOSCOPY (EGD), COMBINED N/A 11/23/2016    Procedure: COMBINED ESOPHAGOSCOPY, GASTROSCOPY, DUODENOSCOPY (EGD);  Surgeon: Quinten Feliciano MD;  Location:  GI     EXTERNAL EAR SURGERY      right     EYE SURGERY      radial keratomy     GRAFT BONE FROM ILIAC CREST  2/14/2013    Procedure: GRAFT BONE FROM ILIAC CREST;  mac with block and local infilitration;  Surgeon: Av Mendez MD;  Location:  OR     HC BREATH HYDROGEN TEST N/A 10/14/2016    Procedure: HYDROGEN BREATH TEST;  Surgeon: Cheri Barron MD;  Location:  GI     HERNIA REPAIR      UMBILICAL     HERNIA REPAIR      umbilical age 18 mos.     HERNIA REPAIR       HERNIORRHAPHY UMBILICAL  1/31/1954     HYSTERECTOMY TOTAL ABDOMINAL  5/3/00  "    HYSTERECTOMY TOTAL ABDOMINAL  5/3/2000     MASTECTOMY      PROPHYLACTIC RIGHT BREAST     MASTECTOMY MODIFIED RADICAL      LEFT BREAST     MASTECTOMY MODIFIED RADICAL      bilateral; right breast prophylactic     PARATHYROIDECTOMY  9/23/04    R SUPERIOR     PARATHYROIDECTOMY  9/23/2004    parathyroid resection, subtotal     REMOVE HARDWARE HAND  9/24/2013    Procedure: REMOVE HARDWARE HAND;  Left Hand Screw Removal        RHINOPLASTY  12/31/1985     RHINOPLASTY  12/31/1985     thyr proc skin closed cosmetic manner by subcuticular stitch  1/23/2009     TONSILLECTOMY  3/1/68     TONSILLECTOMY  3/1/1968     WRIST SURGERY      wrist arthrodesis     SOCIAL HISTORY: , Never Smoker.  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      FAMILY HISTORY:  Two brothers with peripheral neuropathy    ALLERGIES:  Allergies   Allergen Reactions     Penicillins Hives     MEDICATIONS:  REVIEW OF SYSTEM:  Skin: negative  Eyes: negative  Ears/Nose/Throat: negative  Respiratory: negative  Cardiovascular: negative  Gastrointestinal: negative  Genitourinary: negative  Musculoskeletal: negative  Neurologic: negative  Psychiatric: negative  Hematologic/Lymphatic/Immunologic: negative  Endocrine: negative    PHYSICAL EXAM:  Vitals:    BP Readings from Last 1 Encounters:   11/11/19 132/78     Pulse Readings from Last 1 Encounters:   11/11/19 83     Wt Readings from Last 1 Encounters:   01/28/20 84.4 kg (186 lb)     Ht Readings from Last 1 Encounters:   01/28/20 1.702 m (5' 7\")     Estimated body mass index is 29.13 kg/m  as calculated from the following:    Height as of 1/28/20: 1.702 m (5' 7\").    Weight as of 1/28/20: 84.4 kg (186 lb).    Temp Readings from Last 1 Encounters:   11/11/19 99.2  F (37.3  C) (Oral)       General: Patient is no apparent distress    Mental Status: Alert and oriented to person, place, time, and situation.  Able to provide a good history. "     Cranial Nerves: Visual fields full to confrontation.  Extraocular movements full.  No nystagmus.  Normal conversational hearing with hearing aids.  Face symmetric with normal movements. Tongue midline.  Normal shoulder elevation.      Motor:  No pronator drift.  Normal finger taps.  Can rise from seated position without assistance.     Coordination: Normal finger to nose, rapid alternating movements    Gait: Good initiation of marching in place.  Positive Romberg.      DATA:  All available and relevant labs, imaging, and other procedures were reviewed personally.   Last brain imaging:  MR BRAIN W/O & W CONTRAST 4/21/2017 3:12 PM     Provided History: Malignant neoplasm of unspecified site of  unspecified female breast, Dizziness and giddiness.     Comparison: Neck MRI 9/29/2008.     Technique: Multiplanar T1-weighted, axial FLAIR, and susceptibility  images were obtained without intravenous contrast. Following  intravenous gadolinium-based contrast administration, axial  T2-weighted, diffusion, and T1-weighted images (in multiple planes)  were obtained.     Findings:  There is no mass, extra-axial collection or midline shift. Mild  generalized parenchymal volume loss. The ventricles are not enlarged  out of proportion to cerebral sulci. No abnormal foci of intracranial  diffusion restriction.  There is an area of susceptibility effect in the center of the  inferior aspect of the middle lobe. There is corresponding very subtle  T2 hyperintensity (series #7, image 2). On postcontrast images very  mild brush-like enhancement is seen. No other concerning enhancing  lesions are seen.  The major cranial vascular flow-voids are present. There are multiple  scattered nonspecific T2 hyperintense foci in periventricular and  supraventricular white matter of both cerebral hemispheres. These are  nonspecific may represent sequela of prior ischemia, inflammation or  can be seen with chronic migraine headaches.     Mild  scattered paranasal sinus mucosal thickening. The mastoid air  cells are clear. The orbits are unremarkable. The marrow is normal.                                                          Impression:  1. No evidence of metastatic disease.  2. Findings suggestive of capillary telangiectasia in the inferior  aspect of the medulla.      I have personally reviewed the examination and initial interpretation  and I agree with the findings.     RADHA ZENG MD      Last audiogram: 3-11-20  Pure Tone Thresholds assessed using conventional audiometry with fair to good  reliability from 250-8000 Hz bilaterally using circumaural headphones and reassessed using insert earphones     RIGHT:  Borderline normal sloping to moderately severe sensorineural hearing loss; 10-20 dB decrease most frequencies.     LEFT:    Normal sloping to moderate sensorineural hearing loss; 10 dB decrease 1000, 3000 and 8000 Hz  *5-20 dB asymmetry noted most frequencies, right ear poorer     Tympanogram:    RIGHT: normal eardrum mobility    LEFT:   normal eardrum mobility     Reflexes (reported by stimulus ear):  RIGHT: Ipsilateral is present at normal levels  RIGHT: Contralateral is present at normal levels  LEFT:   Ipsilateral is present at normal levels  LEFT:   Contralateral is present at normal levels     Speech Reception Threshold:    RIGHT: 30 dB HL    LEFT:   15 dB HL  Word Recognition Score:     RIGHT: 96% at 70 dB HL using NU-6 recorded word list.    LEFT:   92% at 60 dB HL using NU-6 recorded word list    Last ENG/VNG: 3-11-20  Dizziness Handicap Inventory (DHI): 34/100 Mild perceived impairment     Rotational chair testing:   Sinusoidal harmonic acceleration test:  Spontaneous nystagmus: Absent  Phase: Normal at 0.01, 0.02, 0.08, and 0.32 Hz  Gain: Normal at 0.01, 0.02, 0.08, and 0.32 Hz  Symmetry: Normal at 0.01, 0.02, 0.08, and 0.32 Hz  Spectral Purity: Normal at 0.01, 0.02, 0.08, and 0.32 Hz  Overall rotational chair test: Normal at 0.01,  0.02, 0.08, and 0.32 Hz     Videonystagmography (VNG) testing:  Prescreening:  Tympanograms: Normal eardrum mobility bilaterally. Note: this test is completed to determine the status of the middle ear before irrigations are completed.  Ocular range of motion and ocular counter roll: Normal  Cross/cover: Normal  Head Thrust: Negative      Note: Patient is experiencing nausea when she arrives for testing. Any note of symptoms is an increase in her baseline nausea.     Nystagmus Tests:  Gaze-Horizontal with fixation:              Center: Normal              Right: Normal              Left: Normal. Patient reported double vision  Gaze-Vertical with fixation:              Up: Normal. Patient reported double vision              Down: Normal  Gaze with fixation denied              Center: Normal              Right: Normal              Left: Minimal 2 deg/sec left beating (likely endpoint/non-significant)              Up: Normal  High Frequency Headshake:              Horizontal: Negative for nystagmus. Patient reported slight increase in nausea              Vertical: Negative. Patient reported increased nausea     Elton-Hallpike Head Right: Negative for nystagmus & increased symptoms   Mendy-Hallpike Head Left: Negative for nystagmus & increased symptoms   Roll Test Head Right: Negative for nystagmus & increased symptoms   Roll Test Head Left: Negative for nystagmus & increased symptoms      Positional Testing:  Positionals: Supine: Normal  Positionals: Body Right: Normal  Positionals: Body Left: 2 deg/sec left beating  Positionals: Pre-Caloric: Normal     Oculomotor Tests:  Saccades: Normal upon repeat  Anti-saccades: Normal; Patient able to perform task  Pursuit: Normal upon repeat. Patient reported double vision as stimulus moved to the left     Calorics :  (Tested at 44 degrees and 30 degrees Celsius for 30 seconds for warm and cool water, respectively):  Right Warm Eye Speed: 35 degrees per second right beating  Left Warm  Eye Speed: 45 degrees per second left beating  Right Cool Eye Speed: 18 degrees per second left beating  Left Cool Eye Speed: 22 degrees per second right beating  Difference between ear: 12% right hypofunction. (Greater than 25% considered clinically significant.)  Fixation Index: 0.06 Normal  Overall caloric test: Normal     EXAMINATION: Ultrasound head and neck soft tissue, 8/1/2017 3:58 PM      COMPARISON: No similar study. MRI 9/29/2008; PET/CT 1/16/2008.     HISTORY: Palpable neck lump.     FINDINGS: According to medical record, patient has a history of  hyperparathyroidism status post 2 operations demonstrating  hyperplasia. Status post right hemithyroidectomy.     Solid 0.9 x 0.6 x 1.7 cm nodule, right paratracheal anteriorly and  medially to the right internal jugular vein with peripheral  vascularization.     Postoperative changes of right hemithyroidectomy. Complex solid cystic  nodule in the left lobe of the thyroid gland visualized in the cine  images. Unremarkable isthmus.                                                                IMPRESSION: In this patient with history of right hemithyroidectomy  and prior surgeries for hyperparathyroidism there is a solid nodule,  right paratracheal, anteriorly and medially to the right internal  jugular vein, as described above. Differentials could include a  prominent hyperparathyroid gland/adenoma versus residual thyroid or  granulation tissue. Lymphadenopathy is considered less likely.  Consider further evaluation with nuclear medicine study for further  characterization.     I have personally reviewed the examination and initial interpretation  and I agree with the findings.     KARLI LOVE MD    ASSESSMENT:  1. There were no indications of central vestibular system involvement noted on today's exam.      2. There were no indications of peripheral vestibular system involvement noted on today's exam.   Last Labs:  CMP  Recent Labs   Lab Test  11/11/19  1509 05/13/19  1135 11/13/18  0956 05/14/18  1028 02/15/18  1948  11/21/17  1159 09/15/16  1411     --  138  --  141  --  141 142   POTASSIUM 3.1* 3.4 3.1*  --  3.2*  --  3.8 3.0*   CHLORIDE 105  --  103  --  104  --  104 102   CO2 30  --  28  --  29  --  29 32   ANIONGAP 5  --  7  --  8  --  9 8   GLC 88  --  106*  --  108*  --  94 95   BUN 18  --  19  --  15  --  11 13   CR 0.66 0.70 0.73 0.52 0.60   < > 0.52 0.55   GFRESTIMATED >90 89 80 >90 >90   < > >90 >90  Non African American GFR Calc     GFRESTBLACK >90 >90 >90 >90 >90   < > >90 >90  African American GFR Calc     RAMBO 9.2 9.8 9.3 9.3 8.9   < > 8.8 9.8   PHOS  --   --   --   --   --   --  3.0  --    PROTTOTAL 7.1  --  7.8  --   --   --  7.3 7.5   ALBUMIN 4.1 3.9 4.2 4.0  --    < > 4.0 4.3   BILITOTAL 0.3  --  0.5  --   --   --  0.3 0.4   ALKPHOS 69  --  80  --   --   --  68 70   AST 17  --  22  --   --   --  27 30   ALT 27  --  28  --   --   --  38 39    < > = values in this interval not displayed.     CBC  Recent Labs   Lab Test 11/11/19  1509 11/13/18  0956 02/15/18  1948 04/17/17  1728   HGB 11.4* 12.5 12.1 12.2   WBC 5.3 7.8 8.1 6.9   RBC 4.04 4.58 4.38 4.40   HCT 35.7 39.2 38.0 37.7   MCV 88 86 87 86   MCH 28.2 27.3 27.6 27.7   MCHC 31.9 31.9 31.8 32.4   RDW 14.6 15.4* 15.6* 14.8    284 259 234     INRNo results for input(s): INR, PTT in the last 48126 hours.    IMPRESSIONS:  Nadira Perez is a 69 year old woman with a history of breast cancer status post chemotherapy, peripheral neuropathy, and a potential lower medullary brain lesion, with chronically progressive gait imbalance and head motion induced nausea.  Some aspects of her head motion induced symptoms could be consistent with benign paroxysmal positional vertigo (BPPV) though the side-to-side head movement induced symptoms would generally not be consistent with BPPV.  The vestibular nuclei reside in the medulla junction with the vikas so it is possible that she might  have some central vestibular dysfunction if this is a real lesion.  It does bear repeating the scan to see if the lesion can be confirmed.    Recommendations:  1. MRI brain to confirm the medullary lesion  2. Garcia-Daroff exercises three times a day for possible BPPV  3. Continue habituation exercises for motion sickness and head movement intolerance  4. Can do ondansetron prior to vestibular therapy to help with nausea but would avoid meclizine or other vestibular suppressants.    70-minutes were spent in evaluation, examination, and counseling in real time with more than 50% spent in counseling and coordination of care.    After a review of the patient s situation, this visit was changed from an in-person visit to a video visit to reduce the risk of COVID-19 exposure.     The patient is being evaluated via a billable video visit.    Video-Visit Details    Type of service:  Video Visit    Video End Time (time video stopped): 3:20pm    Originating Location (pt. Location): Home    Distant Location (provider location):  [facility] NEUROLOGY     Mode of Communication:  Video Conference via [facility]      Clau HANSON Cha, MD          Again, thank you for allowing me to participate in the care of your patient.      Sincerely,    Clau HANSON Cha, MD

## 2020-04-22 NOTE — PROGRESS NOTES
"Nadira Perez is a 69 year old female who is being evaluated via a billable video visit.      The patient has been notified of following:     \"This video visit will be conducted via a call between you and your physician/provider. We have found that certain health care needs can be provided without the need for an in-person physical exam.  This service lets us provide the care you need with a video conversation.  If a prescription is necessary we can send it directly to your pharmacy.  If lab work is needed we can place an order for that and you can then stop by our lab to have the test done at a later time.    Video visits are billed at different rates depending on your insurance coverage.  Please reach out to your insurance provider with any questions.    If during the course of the call the physician/provider feels a video visit is not appropriate, you will not be charged for this service.\"    Patient has given verbal consent for Video visit? YES     How would you like to obtain your AVS? [facility]      Patient would like the video invitation sent by: TEXT [phone]     Will anyone else be joining your video visit? NO        Video Start Time: 2:10pm     The clinical encounter between myself and Nadira Perez was performed utilizing a real-time video connection between my location and the patient's location, which was confirmed during the encounter.  Consent was obtained from the patient to perform this clinical encounter via telehealth modality. The visit was done between [facility] and [facility] because of video difficulties on [facility].     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  The patient is a nurse and an excellent historian.      The patient was diagnosed with breast cancer (Stage 3A) in 2007 and underwent bilateral mastectomies that year. This was followed by treatment with Arimadex for 5 years then Tamoxifen for 5 years, and then Arimadex again for 2 years.       Sometime in 2017 she gradually started noticing some balance issues.  This is most apparent when she is trying to get up and starts to walk.  She can stumble if she doesn't have something to brace herself against, like a wall. This does not seem to be blood pressure related as she has checked her blood pressure during the symptomatic periods.  It generally happens more at the beginning of the movement but she denies stiffness, rigidity, or freezing of gait.   Walking longer doesn't make it worse.  She notices that she veers to either side but that she tends to tip more to the left.  She does walk slower now for safety reasons and she does falls about 2-3x.month.  In the last month she has started to use a 4-wheeled walker when she has to carry things while walking but she does not normally use any gait assist devices for regular ambulation.     During this time she also developed some head movement sensitivity and a greater propensity for motion sickness.  She does have a lifelong history of motion sickness. This is problematic even when moving her head from side to side.  It is not particularly worse when she is going into and out of bed but tilting her head back will make her feel nauseated.  She doesn't experience overt spinning vertigo but more of a sense that she *might* start experiencing vertigo.   She went to [facility] center for vestibular therapy and noticed that head movement exercises made her  nauseated.      She had had an MRI of the brain in 2017 to be sure that her breast cancer had not spread there.  It was negative for metastatic cancer spread but there was a signal abnormality on the right side of the medulla that was read as being a potential capillary telangiectasia.     She does have bilateral hearing aids and experiences very brief but very rare tinnitus.  There is no pressure in the ears.  She denies diplopia, facial numbness, clumsiness, and any lateralized motor or sensory problems. She has had some trouble swallowing after two rounds of parathyroid surgery ([id number]).  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  She is otherwise swallowing without problems and her speech is fine.     She has also suffered from progressive peripheral neuropathy which is experienced as pain, numbness, and tingling in the toes.  She can feel the temperature of bath water.  The feet are actually more sensitive. These symptoms started before chemotherapy and chemotherapy it didn't make it worse.  This has been gradually progressive, however.  Walking in the dark is not particularly bothersome and she does not have difficulty walking on soft ground.  Climbing steps would be a problem, however.      She denies headaches or visual problems.  She had cataract surgery which helped vision and she does wear glasses.      Finally, she reports losing about 2 inches of height from osteoporosis and hyperparathyroidism.  She has an L4 compression fracture and has been diagnosed with scoliosis.  She does work with a [employer] specialist and a great .     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     Thyroid disease         PAST SURGICAL HISTORY:  Past Surgical History:   Procedure Laterality Date     ABDOMEN SURGERY      ovarian cyst, mesh     ARTHRODESIS WRIST       ARTHRODESIS WRIST  2/14/2013    Procedure: ARTHRODESIS WRIST;  left wrist scaphoid excision, four bone fusion, iliac crest bone graft  ( Mac with block);  Surgeon: Av Mendez MD;  Location: [facility]     BIOPSY      skin, vaginal     BIOPSY OF SKIN LESION       CATARACT IOL, RT/LT Right 03/13/2018     CATARACT IOL, RT/LT Left 02/20/2018     COLONOSCOPY  12/24/2013    Procedure: COMBINED COLONOSCOPY, SINGLE BIOPSY/POLYPECTOMY BY BIOPSY;  COLONOSCOPY;  Surgeon: Dom Alvarez MD;  Location:  GI     COSMETIC SURGERY       ESOPHAGOSCOPY, GASTROSCOPY, DUODENOSCOPY (EGD), COMBINED N/A 11/23/2016    Procedure: COMBINED ESOPHAGOSCOPY, GASTROSCOPY, DUODENOSCOPY (EGD);  Surgeon: Quinten Feliciano MD;  Location:  GI     EXTERNAL EAR SURGERY      right     EYE SURGERY      radial keratomy     GRAFT BONE FROM ILIAC CREST  2/14/2013    Procedure: GRAFT BONE FROM ILIAC CREST;  mac with block and local infilitration;  Surgeon: Av Mendez MD;  Location:  OR      BREATH HYDROGEN TEST N/A 10/14/2016    Procedure: HYDROGEN BREATH TEST;  Surgeon: Cheri Barron MD;  Location:  GI     HERNIA REPAIR      UMBILICAL     HERNIA REPAIR      umbilical age 18 mos.     HERNIA REPAIR       HERNIORRHAPHY UMBILICAL  1/31/1954     HYSTERECTOMY TOTAL ABDOMINAL  5/3/00     HYSTERECTOMY TOTAL ABDOMINAL  5/3/2000     MASTECTOMY      PROPHYLACTIC RIGHT BREAST     MASTECTOMY MODIFIED RADICAL      LEFT BREAST     MASTECTOMY MODIFIED RADICAL      bilateral; right breast prophylactic     PARATHYROIDECTOMY  9/23/04    R SUPERIOR     PARATHYROIDECTOMY  9/23/2004     "parathyroid resection, subtotal     REMOVE HARDWARE HAND  9/24/2013    Procedure: REMOVE HARDWARE HAND;  Left Hand Screw Removal        RHINOPLASTY  12/31/1985     RHINOPLASTY  12/31/1985     thyr proc skin closed cosmetic manner by subcuticular stitch  1/23/2009     TONSILLECTOMY  3/1/68     TONSILLECTOMY  3/1/1968     WRIST SURGERY      wrist arthrodesis     SOCIAL HISTORY: , Never Smoker.  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      FAMILY HISTORY:  Two brothers with peripheral neuropathy    ALLERGIES:  Allergies   Allergen Reactions     Penicillins Hives     MEDICATIONS:  REVIEW OF SYSTEM:  Skin: negative  Eyes: negative  Ears/Nose/Throat: negative  Respiratory: negative  Cardiovascular: negative  Gastrointestinal: negative  Genitourinary: negative  Musculoskeletal: negative  Neurologic: negative  Psychiatric: negative  Hematologic/Lymphatic/Immunologic: negative  Endocrine: negative    PHYSICAL EXAM:  Vitals:    BP Readings from Last 1 Encounters:   11/11/19 132/78     Pulse Readings from Last 1 Encounters:   11/11/19 83     Wt Readings from Last 1 Encounters:   01/28/20 84.4 kg (186 lb)     Ht Readings from Last 1 Encounters:   01/28/20 1.702 m (5' 7\")     Estimated body mass index is 29.13 kg/m  as calculated from the following:    Height as of 1/28/20: 1.702 m (5' 7\").    Weight as of 1/28/20: 84.4 kg (186 lb).    Temp Readings from Last 1 Encounters:   11/11/19 99.2  F (37.3  C) (Oral)       General: Patient is no apparent distress    Mental Status: Alert and oriented to person, place, time, and situation.  Able to provide a good history.     Cranial Nerves: Visual fields full to confrontation.  Extraocular movements full.  No nystagmus.  Normal conversational hearing with hearing aids.  Face symmetric with normal movements. Tongue midline.  Normal shoulder elevation.      Motor:  No pronator drift.  Normal finger taps.  Can rise " from seated position without assistance.     Coordination: Normal finger to nose, rapid alternating movements    Gait: Good initiation of marching in place.  Positive Romberg.      DATA:  All available and relevant labs, imaging, and other procedures were reviewed personally.   Last brain imaging:  MR BRAIN W/O & W CONTRAST 4/21/2017 3:12 PM     Provided History: Malignant neoplasm of unspecified site of  unspecified female breast, Dizziness and giddiness.     Comparison: Neck MRI 9/29/2008.     Technique: Multiplanar T1-weighted, axial FLAIR, and susceptibility  images were obtained without intravenous contrast. Following  intravenous gadolinium-based contrast administration, axial  T2-weighted, diffusion, and T1-weighted images (in multiple planes)  were obtained.     Findings:  There is no mass, extra-axial collection or midline shift. Mild  generalized parenchymal volume loss. The ventricles are not enlarged  out of proportion to cerebral sulci. No abnormal foci of intracranial  diffusion restriction.  There is an area of susceptibility effect in the center of the  inferior aspect of the middle lobe. There is corresponding very subtle  T2 hyperintensity (series #7, image 2). On postcontrast images very  mild brush-like enhancement is seen. No other concerning enhancing  lesions are seen.  The major cranial vascular flow-voids are present. There are multiple  scattered nonspecific T2 hyperintense foci in periventricular and  supraventricular white matter of both cerebral hemispheres. These are  nonspecific may represent sequela of prior ischemia, inflammation or  can be seen with chronic migraine headaches.     Mild scattered paranasal sinus mucosal thickening. The mastoid air  cells are clear. The orbits are unremarkable. The marrow is normal.                                                          Impression:  1. No evidence of metastatic disease.  2. Findings suggestive of capillary telangiectasia in the  inferior  aspect of the medulla.      I have personally reviewed the examination and initial interpretation  and I agree with the findings.     RADHA ZENG MD      Last audiogram: 3-11-20  Pure Tone Thresholds assessed using conventional audiometry with fair to good  reliability from 250-8000 Hz bilaterally using circumaural headphones and reassessed using insert earphones     RIGHT:  Borderline normal sloping to moderately severe sensorineural hearing loss; 10-20 dB decrease most frequencies.     LEFT:    Normal sloping to moderate sensorineural hearing loss; 10 dB decrease 1000, 3000 and 8000 Hz  *5-20 dB asymmetry noted most frequencies, right ear poorer     Tympanogram:    RIGHT: normal eardrum mobility    LEFT:   normal eardrum mobility     Reflexes (reported by stimulus ear):  RIGHT: Ipsilateral is present at normal levels  RIGHT: Contralateral is present at normal levels  LEFT:   Ipsilateral is present at normal levels  LEFT:   Contralateral is present at normal levels     Speech Reception Threshold:    RIGHT: 30 dB HL    LEFT:   15 dB HL  Word Recognition Score:     RIGHT: 96% at 70 dB HL using NU-6 recorded word list.    LEFT:   92% at 60 dB HL using NU-6 recorded word list    Last ENG/VNG: 3-11-20  Dizziness Handicap Inventory (DHI): 34/100 Mild perceived impairment     Rotational chair testing:   Sinusoidal harmonic acceleration test:  Spontaneous nystagmus: Absent  Phase: Normal at 0.01, 0.02, 0.08, and 0.32 Hz  Gain: Normal at 0.01, 0.02, 0.08, and 0.32 Hz  Symmetry: Normal at 0.01, 0.02, 0.08, and 0.32 Hz  Spectral Purity: Normal at 0.01, 0.02, 0.08, and 0.32 Hz  Overall rotational chair test: Normal at 0.01, 0.02, 0.08, and 0.32 Hz     Videonystagmography (VNG) testing:  Prescreening:  Tympanograms: Normal eardrum mobility bilaterally. Note: this test is completed to determine the status of the middle ear before irrigations are completed.  Ocular range of motion and ocular counter roll:  Normal  Cross/cover: Normal  Head Thrust: Negative      Note: Patient is experiencing nausea when she arrives for testing. Any note of symptoms is an increase in her baseline nausea.     Nystagmus Tests:  Gaze-Horizontal with fixation:              Center: Normal              Right: Normal              Left: Normal. Patient reported double vision  Gaze-Vertical with fixation:              Up: Normal. Patient reported double vision              Down: Normal  Gaze with fixation denied              Center: Normal              Right: Normal              Left: Minimal 2 deg/sec left beating (likely endpoint/non-significant)              Up: Normal  High Frequency Headshake:              Horizontal: Negative for nystagmus. Patient reported slight increase in nausea              Vertical: Negative. Patient reported increased nausea     Mendy-Hallpike Head Right: Negative for nystagmus & increased symptoms   Goodyear-Hallpike Head Left: Negative for nystagmus & increased symptoms   Roll Test Head Right: Negative for nystagmus & increased symptoms   Roll Test Head Left: Negative for nystagmus & increased symptoms      Positional Testing:  Positionals: Supine: Normal  Positionals: Body Right: Normal  Positionals: Body Left: 2 deg/sec left beating  Positionals: Pre-Caloric: Normal     Oculomotor Tests:  Saccades: Normal upon repeat  Anti-saccades: Normal; Patient able to perform task  Pursuit: Normal upon repeat. Patient reported double vision as stimulus moved to the left     Calorics :  (Tested at 44 degrees and 30 degrees Celsius for 30 seconds for warm and cool water, respectively):  Right Warm Eye Speed: 35 degrees per second right beating  Left Warm Eye Speed: 45 degrees per second left beating  Right Cool Eye Speed: 18 degrees per second left beating  Left Cool Eye Speed: 22 degrees per second right beating  Difference between ear: 12% right hypofunction. (Greater than 25% considered clinically significant.)  Fixation Index:  0.06 Normal  Overall caloric test: Normal     EXAMINATION: Ultrasound head and neck soft tissue, 8/1/2017 3:58 PM      COMPARISON: No similar study. MRI 9/29/2008; PET/CT 1/16/2008.     HISTORY: Palpable neck lump.     FINDINGS: According to medical record, patient has a history of  hyperparathyroidism status post 2 operations demonstrating  hyperplasia. Status post right hemithyroidectomy.     Solid 0.9 x 0.6 x 1.7 cm nodule, right paratracheal anteriorly and  medially to the right internal jugular vein with peripheral  vascularization.     Postoperative changes of right hemithyroidectomy. Complex solid cystic  nodule in the left lobe of the thyroid gland visualized in the cine  images. Unremarkable isthmus.                                                                IMPRESSION: In this patient with history of right hemithyroidectomy  and prior surgeries for hyperparathyroidism there is a solid nodule,  right paratracheal, anteriorly and medially to the right internal  jugular vein, as described above. Differentials could include a  prominent hyperparathyroid gland/adenoma versus residual thyroid or  granulation tissue. Lymphadenopathy is considered less likely.  Consider further evaluation with nuclear medicine study for further  characterization.     I have personally reviewed the examination and initial interpretation  and I agree with the findings.     KARLI LOVE MD    ASSESSMENT:  1. There were no indications of central vestibular system involvement noted on today's exam.      2. There were no indications of peripheral vestibular system involvement noted on today's exam.   Last Labs:  CMP  Recent Labs   Lab Test 11/11/19  1509 05/13/19  1135 11/13/18  0956 05/14/18  1028 02/15/18  1948  11/21/17  1159 09/15/16  1411     --  138  --  141  --  141 142   POTASSIUM 3.1* 3.4 3.1*  --  3.2*  --  3.8 3.0*   CHLORIDE 105  --  103  --  104  --  104 102   CO2 30  --  28  --  29  --  29 32   ANIONGAP 5  --   7  --  8  --  9 8   GLC 88  --  106*  --  108*  --  94 95   BUN 18  --  19  --  15  --  11 13   CR 0.66 0.70 0.73 0.52 0.60   < > 0.52 0.55   GFRESTIMATED >90 89 80 >90 >90   < > >90 >90  Non African American GFR Calc     GFRESTBLACK >90 >90 >90 >90 >90   < > >90 >90  African American GFR Calc     RAMBO 9.2 9.8 9.3 9.3 8.9   < > 8.8 9.8   PHOS  --   --   --   --   --   --  3.0  --    PROTTOTAL 7.1  --  7.8  --   --   --  7.3 7.5   ALBUMIN 4.1 3.9 4.2 4.0  --    < > 4.0 4.3   BILITOTAL 0.3  --  0.5  --   --   --  0.3 0.4   ALKPHOS 69  --  80  --   --   --  68 70   AST 17  --  22  --   --   --  27 30   ALT 27  --  28  --   --   --  38 39    < > = values in this interval not displayed.     CBC  Recent Labs   Lab Test 11/11/19  1509 11/13/18  0956 02/15/18  1948 04/17/17  1728   HGB 11.4* 12.5 12.1 12.2   WBC 5.3 7.8 8.1 6.9   RBC 4.04 4.58 4.38 4.40   HCT 35.7 39.2 38.0 37.7   MCV 88 86 87 86   MCH 28.2 27.3 27.6 27.7   MCHC 31.9 31.9 31.8 32.4   RDW 14.6 15.4* 15.6* 14.8    284 259 234     INRNo results for input(s): INR, PTT in the last 96700 hours.    IMPRESSIONS:  Nadira Perez is a 69 year old woman with a history of breast cancer status post chemotherapy, peripheral neuropathy, and a potential lower medullary brain lesion, with chronically progressive gait imbalance and head motion induced nausea.  Some aspects of her head motion induced symptoms could be consistent with benign paroxysmal positional vertigo (BPPV) though the side-to-side head movement induced symptoms would generally not be consistent with BPPV.  The vestibular nuclei reside in the medulla junction with the vikas so it is possible that she might have some central vestibular dysfunction if this is a real lesion.  It does bear repeating the scan to see if the lesion can be confirmed.    Recommendations:  1. MRI brain to confirm the medullary lesion  2. Garcia-Daroff exercises three times a day for possible BPPV  3. Continue habituation  exercises for motion sickness and head movement intolerance  4. Can do ondansetron prior to vestibular therapy to help with nausea but would avoid meclizine or other vestibular suppressants.    70-minutes were spent in evaluation, examination, and counseling in real time with more than 50% spent in counseling and coordination of care.    After a review of the patient s situation, this visit was changed from an in-person visit to a video visit to reduce the risk of COVID-19 exposure.     The patient is being evaluated via a billable video visit.    Video-Visit Details    Type of service:  Video Visit    Video End Time (time video stopped): 3:20pm    Originating Location (pt. Location): Home    Distant Location (provider location):  [facility] NEUROLOGY     Mode of Communication:  Video Conference via [facility]      Clau HANSON Cha, MD

## 2020-04-22 NOTE — PROGRESS NOTES
"Nadira Perez is a 69 year old female who is being evaluated via a billable video visit.      The patient has been notified of following:     \"This video visit will be conducted via a call between you and your physician/provider. We have found that certain health care needs can be provided without the need for an in-person physical exam.  This service lets us provide the care you need with a video conversation.  If a prescription is necessary we can send it directly to your pharmacy.  If lab work is needed we can place an order for that and you can then stop by our lab to have the test done at a later time.    Video visits are billed at different rates depending on your insurance coverage.  Please reach out to your insurance provider with any questions.    If during the course of the call the physician/provider feels a video visit is not appropriate, you will not be charged for this service.\"    Patient has given verbal consent for Video visit? YES    How would you like to obtain your AVS?   [facility]     Patient would like the video invitation sent by: TEXT [phone]    Will anyone else be joining your video visit? NO   {If patient encounters technical issues they should call [phone] :[id number]}    Video Start Time: 2:00pm     Video-Visit Details    Type of service:  Video Visit    Video End Time (time video stopped): ***    Originating Location (pt. Location): {video visit patient location:038456::\"Home\"}    Distant Location (provider location):  [facility] NEUROLOGY     Mode of Communication:  Video Conference via [employer]      {signature options:[id number]}        "

## 2020-05-01 DIAGNOSIS — I10 BENIGN ESSENTIAL HYPERTENSION: ICD-10-CM

## 2020-05-01 RX ORDER — METOPROLOL SUCCINATE 50 MG/1
50 TABLET, EXTENDED RELEASE ORAL DAILY
Qty: 90 TABLET | Refills: 1 | Status: SHIPPED | OUTPATIENT
Start: 2020-05-01 | End: 2020-10-22

## 2020-05-03 DIAGNOSIS — I10 BENIGN ESSENTIAL HYPERTENSION: ICD-10-CM

## 2020-05-04 RX ORDER — AMLODIPINE BESYLATE 10 MG/1
10 TABLET ORAL DAILY
Qty: 90 TABLET | Refills: 3 | Status: SHIPPED | OUTPATIENT
Start: 2020-05-04 | End: 2021-04-28

## 2020-05-04 NOTE — TELEPHONE ENCOUNTER
AMLODIPINE BESYLATE 10 MG TAB   Last Written Prescription Date:  6/24/2019  Last Fill Quantity: 90,   # refills: 3  Last Office Visit : 11/11/2019  Future Office visit:  None  90 Tabs, 3 Refills sent to pharm 5/4/2020    Cora Jones RN  Central Triage Red Flags/Med Refills

## 2020-05-08 ENCOUNTER — PATIENT OUTREACH (OUTPATIENT)
Dept: ONCOLOGY | Facility: CLINIC | Age: 70
End: 2020-05-08

## 2020-05-08 ENCOUNTER — MYC MEDICAL ADVICE (OUTPATIENT)
Dept: ONCOLOGY | Facility: CLINIC | Age: 70
End: 2020-05-08

## 2020-05-08 DIAGNOSIS — M81.8 OTHER OSTEOPOROSIS WITHOUT CURRENT PATHOLOGICAL FRACTURE: Primary | ICD-10-CM

## 2020-05-08 DIAGNOSIS — E55.9 HYPOVITAMINOSIS D: Primary | ICD-10-CM

## 2020-05-08 DIAGNOSIS — Z17.0 MALIGNANT NEOPLASM OF BREAST IN FEMALE, ESTROGEN RECEPTOR POSITIVE, UNSPECIFIED LATERALITY, UNSPECIFIED SITE OF BREAST (H): ICD-10-CM

## 2020-05-08 DIAGNOSIS — D05.90 CARCINOMA IN SITU OF BREAST, UNSPECIFIED LATERALITY, UNSPECIFIED TYPE: ICD-10-CM

## 2020-05-08 DIAGNOSIS — C50.919 MALIGNANT NEOPLASM OF BREAST IN FEMALE, ESTROGEN RECEPTOR POSITIVE, UNSPECIFIED LATERALITY, UNSPECIFIED SITE OF BREAST (H): ICD-10-CM

## 2020-05-08 RX ORDER — ZOLEDRONIC ACID 0.04 MG/ML
4 INJECTION, SOLUTION INTRAVENOUS ONCE
Status: CANCELLED | OUTPATIENT
Start: 2020-05-14

## 2020-05-11 ENCOUNTER — VIRTUAL VISIT (OUTPATIENT)
Dept: ONCOLOGY | Facility: CLINIC | Age: 70
End: 2020-05-11
Attending: INTERNAL MEDICINE
Payer: COMMERCIAL

## 2020-05-11 DIAGNOSIS — Z17.0 MALIGNANT NEOPLASM OF BREAST IN FEMALE, ESTROGEN RECEPTOR POSITIVE, UNSPECIFIED LATERALITY, UNSPECIFIED SITE OF BREAST (H): Primary | ICD-10-CM

## 2020-05-11 DIAGNOSIS — C50.919 MALIGNANT NEOPLASM OF BREAST IN FEMALE, ESTROGEN RECEPTOR POSITIVE, UNSPECIFIED LATERALITY, UNSPECIFIED SITE OF BREAST (H): Primary | ICD-10-CM

## 2020-05-11 PROCEDURE — 40000114 ZZH STATISTIC NO CHARGE CLINIC VISIT

## 2020-05-11 PROCEDURE — 99213 OFFICE O/P EST LOW 20 MIN: CPT | Mod: 95 | Performed by: PHYSICIAN ASSISTANT

## 2020-05-11 NOTE — LETTER
"5/11/2020       RE: Nadira Perez  [id number]  [facility] [address]     Dear Colleague,    Thank you for referring your patient, Nadira Perez, to the [facility]. Please see a copy of my visit note below.    Nadira Perez is a 69 year old female who is being evaluated via a billable video visit.      The patient has been notified of following:     \"This video visit will be conducted via a call between you and your physician/provider. We have found that certain health care needs can be provided without the need for an in-person physical exam.  This service lets us provide the care you need with a video conversation.  If a prescription is necessary we can send it directly to your pharmacy.  If lab work is needed we can place an order for that and you can then stop by our lab to have the test done at a later time.    Video visits are billed at different rates depending on your insurance coverage.  Please reach out to your insurance provider with any questions.    If during the course of the call the physician/provider feels a video visit is not appropriate, you will not be charged for this service.\"    Patient has given verbal consent for Video visit? Yes    How would you like to obtain your AVS? MyChart    Patient would like the video invitation sent by: Text to cell phone: [phone]    Will anyone else be joining your video visit? No       Secondary Video Option (Doximity), send text message to:  [phone]    I have reviewed and updated the patient's allergies and medication list.    Concerns: Patient has no new concerns.      Refills: None      Chucho Mccall EMT    Additional Provider Notes:     REASON FOR VISIT : breast cancer follow up.      HISTORY OF PRESENT ILLNESS : The patient is a 69-year-old woman who in 06/2007 had the onset of left-sided breast discomfort that extended into the axilla. She ultimately did undergo mammogram on 06/13/2007, which identified " a mass at the 2:30 position in the left breast. Ultrasound also was notable for some skin and areolar complex thickening. She did undergo biopsy of the mass, and this was consistent with an infiltrating ductal carcinoma that was grade 2. The tumor was ER positive, IA positive, and HER2 negative by FISH. She also had a biopsy of left axillary lymph node versus the tail of the axilla, and this was consistent with metastatic carcinoma. Following the diagnosis the patient did have metastatic staging to include a PET CT scan. This demonstrated increased activity in the left breast as well as the lymph nodes, but was otherwise negative. There was also an MRI of the breast performed, and this demonstrated other lesions of the left breast, but no abnormalities on the right.      She initiated neoadjuvant chemotherapy for her inflammatory breast cancer on 07/13/2007. She received  for 3 cycles through 08/24/2007. This was followed by Taxotere for 3 cycles, which was administered 09/14/2007 through 10/26/2007. Following her chemotherapy she did undergo a left-sided mastectomy with axillary lymph node dissection on 11/20/2007 by Dr. Muaro Mei. The patient did have residual carcinoma with a 1.0 cm tumor as well as associated DCIS. Thirteen of 33 lymph nodes were positive, the largest of which was 0.6 cm of tumor infiltration and included extracapsular extension. The patient also had a right-sided prophylactic mastectomy, which did not demonstrate any noninvasive or invasive carcinoma. Just prior to her surgery Ismael was placed on Arimidex (start date 11/15/2007). Following her surgery she did have some issues with left chest wall cellulitis as well as some lymphedema of the left upper extremity. She ultimately did go on to receive radiation therapy under the direction of Dr. Nghia Burch. She received radiation to the left chest wall at a dose of 6440 cGy, to the left supraclavicular fossa at a dose of 5040 cGy, and to  "the left internal mammary chain at a dose of 5080 cGy. Last dose of radiation was administered on 03/07/2008.     She completed 10 years of endocrine therapy initially with Arimidex followed by tamoxifen. 11/2017 she went back on Arimidex for prolonged extended therapy per MA17 trial.     Interval History: Ismael is feeling well today. 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 She has recently seen ENT for vertigo and imbalance and then saw Neuro, Dr. Antoine, who felt symptoms could be BPPV versus symptoms from potential lower medullary brain lesion. Plan to repeat brain MRI and vestibular rehab exercises. She has also seen ortho for her chronic LBP and had plain films showing scoliosis and DJD. Plan for PT and steroid injections if those fail. She does not have any sciatica associated with this. No new or different pain. No recent fevers/chills, cough, SOB. No lumps/bumps or breast concerns. She has not been as active so she has gained some weight but is planning to resume exercise on furlough. She continues to tolerate arimidex well.     Objective:   Video physical exam  General: Patient appears well in no acute distress. Normal/obese/thin body habitus.  Skin: No visualized rash or lesions on visualized skin  Eyes: EOMI, no erythema, sclera icterus or discharge noted  Resp: Appears to be breathing comfortably without accessory muscle usage, speaking in full sentences, no cough  MSK: Appears to have normal range of motion based on visualized movements  Neurologic: No apparent tremors, facial movements symmetric  Psych: affect and mood congruent, alert and oriented  The rest of a comprehensive physical examination is deferred due to PHE (public health emergency) video restrictions\"    Assessment and Plan:     1. Inflammatory breast cancer, stage IIIC, the left breast, ER positive, HER2 negative, status post neoadjuvant chemotherapy " followed by bilateral mastectomies. Chest wall radiotherapy completed in 03/2008. She was on adjuvant Arimidex from November 2007 until November 2012, and then was switched to tamoxifen. She completed 10 years of adjuvant hormonal therapy in November 2017. At that time, she was switched back to arimidex per MA 17 trial. No clinical concerns for recurrence at this time. Could consider yearly follow-up at this point. Follow-up with Dr. Louise in 6 months.      2. Vertigo/imbalance. Plan to repeat brain MRI to r/o true medullary brain lesion. She will continue vestibular rehab.      3. Osteopenia. Receiving yearly Zometa, will be given tomorrow. DEXA up to date in 11/2019. Due 11/2021. Continue calcium and D. Start regular exercise again.       Video-Visit Details    Type of service:  Video Visit    Video Start Time: 2:12 pm   Video End Time: 2:30 pm     Originating Location (pt. Location): Other work     Distant Location (provider location):  [facility]     Platform used for Video Visit: Harpreet Franco PA-C          Again, thank you for allowing me to participate in the care of your patient.      Sincerely,    Dee Franco PA-C

## 2020-05-11 NOTE — PROGRESS NOTES
"Nadira Perez is a 69 year old female who is being evaluated via a billable video visit.      The patient has been notified of following:     \"This video visit will be conducted via a call between you and your physician/provider. We have found that certain health care needs can be provided without the need for an in-person physical exam.  This service lets us provide the care you need with a video conversation.  If a prescription is necessary we can send it directly to your pharmacy.  If lab work is needed we can place an order for that and you can then stop by our lab to have the test done at a later time.    Video visits are billed at different rates depending on your insurance coverage.  Please reach out to your insurance provider with any questions.    If during the course of the call the physician/provider feels a video visit is not appropriate, you will not be charged for this service.\"    Patient has given verbal consent for Video visit? Yes    How would you like to obtain your AVS? MyChart    Patient would like the video invitation sent by: Text to cell phone: [phone]    Will anyone else be joining your video visit? No       Secondary Video Option (Doximity), send text message to:  [phone]    I have reviewed and updated the patient's allergies and medication list.    Concerns: Patient has no new concerns.      Refills: None      Chucho Mccall, EMT    Additional Provider Notes:     REASON FOR VISIT : breast cancer follow up.      HISTORY OF PRESENT ILLNESS : The patient is a 69-year-old woman who in 06/2007 had the onset of left-sided breast discomfort that extended into the axilla. She ultimately did undergo mammogram on 06/13/2007, which identified a mass at the 2:30 position in the left breast. Ultrasound also was notable for some skin and areolar complex thickening. She did undergo biopsy of the mass, and this was consistent with an infiltrating ductal carcinoma that was grade 2. The tumor " was ER positive, W[place] positive, and HER2 negative by FISH. She also had a biopsy of left axillary lymph node versus the tail of the axilla, and this was consistent with metastatic carcinoma. Following the diagnosis the patient did have metastatic staging to include a PET CT scan. This demonstrated increased activity in the left breast as well as the lymph nodes, but was otherwise negative. There was also an MRI of the breast performed, and this demonstrated other lesions of the left breast, but no abnormalities on the right.      She initiated neoadjuvant chemotherapy for her inflammatory breast cancer on 07/13/2007. She received  for 3 cycles through 08/24/2007. This was followed by Taxotere for 3 cycles, which was administered 09/14/2007 through 10/26/2007. Following her chemotherapy she did undergo a left-sided mastectomy with axillary lymph node dissection on 11/20/2007 by Dr. Mauro Mei. The patient did have residual carcinoma with a 1.0 cm tumor as well as associated DCIS. Thirteen of 33 lymph nodes were positive, the largest of which was 0.6 cm of tumor infiltration and included extracapsular extension. The patient also had a right-sided prophylactic mastectomy, which did not demonstrate any noninvasive or invasive carcinoma. Just prior to her surgery Ismael was placed on Arimidex (start date 11/15/2007). Following her surgery she did have some issues with left chest wall cellulitis as well as some lymphedema of the left upper extremity. She ultimately did go on to receive radiation therapy under the direction of Dr. Nghia Burch. She received radiation to the left chest wall at a dose of 6440 cGy, to the left supraclavicular fossa at a dose of 5040 cGy, and to the left internal mammary chain at a dose of 5080 cGy. Last dose of radiation was administered on 03/07/2008.     She completed 10 years of endocrine therapy initially with Arimidex followed by tamoxifen. 11/2017 she went back on Arimidex for  "prolonged extended therapy per MA17 trial.     Interval History: Ismael is feeling well today. 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 She has recently seen ENT for vertigo and imbalance and then saw Neuro, Dr. Antoine, who felt symptoms could be BPPV versus symptoms from potential lower medullary brain lesion. Plan to repeat brain MRI and vestibular rehab exercises. She has also seen ortho for her chronic LBP and had plain films showing scoliosis and DJD. Plan for PT and steroid injections if those fail. She does not have any sciatica associated with this. No new or different pain. No recent fevers/chills, cough, SOB. No lumps/bumps or breast concerns. She has not been as active so she has gained some weight but is planning to resume exercise on furlough. She continues to tolerate arimidex well.     Objective:   Video physical exam  General: Patient appears well in no acute distress. Normal/obese/thin body habitus.  Skin: No visualized rash or lesions on visualized skin  Eyes: EOMI, no erythema, sclera icterus or discharge noted  Resp: Appears to be breathing comfortably without accessory muscle usage, speaking in full sentences, no cough  MSK: Appears to have normal range of motion based on visualized movements  Neurologic: No apparent tremors, facial movements symmetric  Psych: affect and mood congruent, alert and oriented  The rest of a comprehensive physical examination is deferred due to PHE (public health emergency) video restrictions\"    Assessment and Plan:     1. Inflammatory breast cancer, stage IIIC, the left breast, ER positive, HER2 negative, status post neoadjuvant chemotherapy followed by bilateral mastectomies. Chest wall radiotherapy completed in 03/2008. She was on adjuvant Arimidex from November 2007 until November 2012, and then was switched to tamoxifen. She completed 10 years of adjuvant hormonal therapy in " November 2017. At that time, she was switched back to arimidex per MA 17 trial. No clinical concerns for recurrence at this time. Could consider yearly follow-up at this point. Follow-up with Dr. Louise in 6 months.      2. Vertigo/imbalance. Plan to repeat brain MRI to r/o true medullary brain lesion. She will continue vestibular rehab.      3. Osteopenia. Receiving yearly Zometa, will be given tomorrow. DEXA up to date in 11/2019. Due 11/2021. Continue calcium and D. Start regular exercise again.       Video-Visit Details    Type of service:  Video Visit    Video Start Time: 2:12 pm   Video End Time: 2:30 pm     Originating Location (pt. Location): Other work     Distant Location (provider location):  [facility]     Platform used for Video Visit: Harpreet Franco PA-C

## 2020-05-12 ENCOUNTER — HOSPITAL ENCOUNTER (OUTPATIENT)
Facility: CLINIC | Age: 70
Setting detail: SPECIMEN
End: 2020-05-12
Attending: INTERNAL MEDICINE
Payer: COMMERCIAL

## 2020-05-12 ENCOUNTER — HOSPITAL ENCOUNTER (OUTPATIENT)
Facility: CLINIC | Age: 70
Setting detail: SPECIMEN
Discharge: HOME | End: 2020-05-12
Attending: INTERNAL MEDICINE | Admitting: INTERNAL MEDICINE
Payer: COMMERCIAL

## 2020-05-12 ENCOUNTER — INFUSION THERAPY VISIT (OUTPATIENT)
Dept: INFUSION THERAPY | Facility: CLINIC | Age: 70
End: 2020-05-12
Attending: INTERNAL MEDICINE
Payer: COMMERCIAL

## 2020-05-12 VITALS
SYSTOLIC BLOOD PRESSURE: 146 MMHG | OXYGEN SATURATION: 98 % | DIASTOLIC BLOOD PRESSURE: 86 MMHG | HEART RATE: 74 BPM | TEMPERATURE: 99.4 F | RESPIRATION RATE: 18 BRPM

## 2020-05-12 DIAGNOSIS — C50.919 MALIGNANT NEOPLASM OF BREAST IN FEMALE, ESTROGEN RECEPTOR POSITIVE, UNSPECIFIED LATERALITY, UNSPECIFIED SITE OF BREAST (H): ICD-10-CM

## 2020-05-12 DIAGNOSIS — C50.912 MALIGNANT NEOPLASM OF LEFT BREAST IN FEMALE, ESTROGEN RECEPTOR POSITIVE, UNSPECIFIED SITE OF BREAST (H): ICD-10-CM

## 2020-05-12 DIAGNOSIS — Z17.0 MALIGNANT NEOPLASM OF LEFT BREAST IN FEMALE, ESTROGEN RECEPTOR POSITIVE, UNSPECIFIED SITE OF BREAST (H): ICD-10-CM

## 2020-05-12 DIAGNOSIS — Z17.0 MALIGNANT NEOPLASM OF BREAST IN FEMALE, ESTROGEN RECEPTOR POSITIVE, UNSPECIFIED LATERALITY, UNSPECIFIED SITE OF BREAST (H): ICD-10-CM

## 2020-05-12 DIAGNOSIS — M85.80 OSTEOPENIA, UNSPECIFIED LOCATION: ICD-10-CM

## 2020-05-12 DIAGNOSIS — M81.8 OTHER OSTEOPOROSIS WITHOUT CURRENT PATHOLOGICAL FRACTURE: Primary | ICD-10-CM

## 2020-05-12 DIAGNOSIS — Z79.811 AROMATASE INHIBITOR USE: ICD-10-CM

## 2020-05-12 DIAGNOSIS — E55.9 HYPOVITAMINOSIS D: ICD-10-CM

## 2020-05-12 LAB
ALBUMIN SERPL-MCNC: 4.1 G/DL (ref 3.4–5)
ALP SERPL-CCNC: 75 U/L (ref 40–150)
ALT SERPL W P-5'-P-CCNC: 26 U/L (ref 0–50)
ANION GAP SERPL CALCULATED.3IONS-SCNC: 5 MMOL/L (ref 3–14)
AST SERPL W P-5'-P-CCNC: 34 U/L (ref 0–45)
BASOPHILS # BLD AUTO: 0.1 10E9/L (ref 0–0.2)
BASOPHILS NFR BLD AUTO: 0.6 %
BILIRUB SERPL-MCNC: 0.7 MG/DL (ref 0.2–1.3)
BUN SERPL-MCNC: 16 MG/DL (ref 7–30)
CALCIUM SERPL-MCNC: 8.9 MG/DL (ref 8.5–10.1)
CHLORIDE SERPL-SCNC: 106 MMOL/L (ref 94–109)
CO2 SERPL-SCNC: 28 MMOL/L (ref 20–32)
CREAT SERPL-MCNC: 0.59 MG/DL (ref 0.52–1.04)
DIFFERENTIAL METHOD BLD: ABNORMAL
EOSINOPHIL # BLD AUTO: 0.5 10E9/L (ref 0–0.7)
EOSINOPHIL NFR BLD AUTO: 6.1 %
ERYTHROCYTE [DISTWIDTH] IN BLOOD BY AUTOMATED COUNT: 14.1 % (ref 10–15)
GFR SERPL CREATININE-BSD FRML MDRD: >90 ML/MIN/{1.73_M2}
GLUCOSE SERPL-MCNC: 112 MG/DL (ref 70–99)
HCT VFR BLD AUTO: 39.2 % (ref 35–47)
HGB BLD-MCNC: 12.3 G/DL (ref 11.7–15.7)
IMM GRANULOCYTES # BLD: 0.1 10E9/L (ref 0–0.4)
IMM GRANULOCYTES NFR BLD: 0.9 %
LYMPHOCYTES # BLD AUTO: 1.6 10E9/L (ref 0.8–5.3)
LYMPHOCYTES NFR BLD AUTO: 20.1 %
MCH RBC QN AUTO: 28.7 PG (ref 26.5–33)
MCHC RBC AUTO-ENTMCNC: 31.4 G/DL (ref 31.5–36.5)
MCV RBC AUTO: 92 FL (ref 78–100)
MONOCYTES # BLD AUTO: 0.6 10E9/L (ref 0–1.3)
MONOCYTES NFR BLD AUTO: 7.3 %
NEUTROPHILS # BLD AUTO: 5 10E9/L (ref 1.6–8.3)
NEUTROPHILS NFR BLD AUTO: 65 %
NRBC # BLD AUTO: 0 10*3/UL
NRBC BLD AUTO-RTO: 0 /100
PLATELET # BLD AUTO: 255 10E9/L (ref 150–450)
POTASSIUM SERPL-SCNC: 3.7 MMOL/L (ref 3.4–5.3)
PROT SERPL-MCNC: 7.5 G/DL (ref 6.8–8.8)
RBC # BLD AUTO: 4.28 10E12/L (ref 3.8–5.2)
SODIUM SERPL-SCNC: 139 MMOL/L (ref 133–144)
WBC # BLD AUTO: 7.8 10E9/L (ref 4–11)

## 2020-05-12 PROCEDURE — 96365 THER/PROPH/DIAG IV INF INIT: CPT

## 2020-05-12 PROCEDURE — 82306 VITAMIN D 25 HYDROXY: CPT | Performed by: INTERNAL MEDICINE

## 2020-05-12 PROCEDURE — 80053 COMPREHEN METABOLIC PANEL: CPT | Performed by: INTERNAL MEDICINE

## 2020-05-12 PROCEDURE — 85025 COMPLETE CBC W/AUTO DIFF WBC: CPT | Performed by: INTERNAL MEDICINE

## 2020-05-12 PROCEDURE — 25000128 H RX IP 250 OP 636: Performed by: INTERNAL MEDICINE

## 2020-05-12 RX ORDER — ZOLEDRONIC ACID 0.04 MG/ML
4 INJECTION, SOLUTION INTRAVENOUS ONCE
Status: COMPLETED | OUTPATIENT
Start: 2020-05-12 | End: 2020-05-12

## 2020-05-12 RX ADMIN — ZOLEDRONIC ACID 4 MG: 0.04 INJECTION, SOLUTION INTRAVENOUS at 11:59

## 2020-05-12 ASSESSMENT — PAIN SCALES - GENERAL: PAINLEVEL: MILD PAIN (3)

## 2020-05-12 NOTE — PROGRESS NOTES
Infusion Nursing Note:  Nadira Perez presents today for PIV start, labs and Zometa.    Patient seen by provider today: No   present during visit today: Not Applicable.    Note: Has had Zometa in the past.  Confirmed she is taking Vitamin D.  Does not take oral Calcium because her calcium level is elevated.  Denies recent or upcoming major dental work.      Intravenous Access:  Lab draw site R hand, Needle type angiocath, Gauge 24.  Labs drawn without difficulty.  Peripheral IV placed.    Treatment Conditions:  Lab Results   Component Value Date     05/12/2020                   Lab Results   Component Value Date    POTASSIUM 3.7 05/12/2020           Lab Results   Component Value Date    MAG 2.2 06/11/2009            Lab Results   Component Value Date    CR 0.59 05/12/2020                   Lab Results   Component Value Date    RAMBO 8.9 05/12/2020                Lab Results   Component Value Date    BILITOTAL 0.7 05/12/2020           Lab Results   Component Value Date    ALBUMIN 4.1 05/12/2020                    Lab Results   Component Value Date    ALT 26 05/12/2020           Lab Results   Component Value Date    AST 34 05/12/2020       Results reviewed, labs MET treatment parameters, ok to proceed with treatment.      Post Infusion Assessment:  Patient tolerated infusion without incident.  Blood return noted pre and post infusion.  Site patent and intact, free from redness, edema or discomfort.  No evidence of extravasations.  Access discontinued per protocol.       Discharge Plan:   Discharge instructions reviewed with: Patient.  Patient discharged in stable condition accompanied by: self.  Departure Mode: Ambulatory.  Next lab, MD and Zometa scheduled for 11/9/2020.    CHIRAG SANTAMARIA RN

## 2020-05-13 LAB — DEPRECATED CALCIDIOL+CALCIFEROL SERPL-MC: 56 UG/L (ref 20–75)

## 2020-06-03 ENCOUNTER — OFFICE VISIT (OUTPATIENT)
Dept: OPHTHALMOLOGY | Facility: CLINIC | Age: 70
End: 2020-06-03
Attending: OPHTHALMOLOGY
Payer: COMMERCIAL

## 2020-06-03 DIAGNOSIS — Z96.1 PSEUDOPHAKIA OF BOTH EYES: ICD-10-CM

## 2020-06-03 DIAGNOSIS — H17.9 CORNEAL OPACITY: ICD-10-CM

## 2020-06-03 DIAGNOSIS — H18.529 ANTERIOR BASEMENT MEMBRANE DYSTROPHY: Primary | ICD-10-CM

## 2020-06-03 DIAGNOSIS — H52.213 IRREGULAR ASTIGMATISM OF BOTH EYES: ICD-10-CM

## 2020-06-03 PROCEDURE — 92015 DETERMINE REFRACTIVE STATE: CPT | Mod: ZF

## 2020-06-03 PROCEDURE — G0463 HOSPITAL OUTPT CLINIC VISIT: HCPCS | Mod: ZF

## 2020-06-03 ASSESSMENT — VISUAL ACUITY
OD_SC: 20/25 SLOW
OS_SC: 20/30 SLOW
OD_SC+: -3
OS_SC+: -1/+2
METHOD: SNELLEN - LINEAR

## 2020-06-03 ASSESSMENT — TONOMETRY
OD_IOP_MMHG: 18
OS_IOP_MMHG: 19
IOP_METHOD: ICARE

## 2020-06-03 ASSESSMENT — CONF VISUAL FIELD
OS_NORMAL: 1
OD_SUPERIOR_TEMPORAL_RESTRICTION: 3

## 2020-06-03 ASSESSMENT — REFRACTION_MANIFEST
OD_ADD: +2.75
OS_ADD: +2.75
OD_SPHERE: -0.50
OS_CYLINDER: +1.00
OD_CYLINDER: +1.25
OD_AXIS: 170
OS_SPHERE: PLANO
OS_AXIS: 050

## 2020-06-03 ASSESSMENT — CUP TO DISC RATIO
OD_RATIO: 0.4
OS_RATIO: 0.45

## 2020-06-03 ASSESSMENT — SLIT LAMP EXAM - LIDS
COMMENTS: NORMAL
COMMENTS: NORMAL

## 2020-06-03 ASSESSMENT — EXTERNAL EXAM - LEFT EYE: OS_EXAM: NORMAL

## 2020-06-03 ASSESSMENT — EXTERNAL EXAM - RIGHT EYE: OD_EXAM: NORMAL

## 2020-06-03 NOTE — NURSING NOTE
"Chief Complaints and History of Present Illnesses   Patient presents with     Annual Eye Exam     Chief Complaint(s) and History of Present Illness(es)     Annual Eye Exam     Laterality: both eyes    Onset: 1 year ago    Associated symptoms: flashes (notes some flashes of light in both eyes when her eyes are closed at times - never notes with the eyes open).  Negative for dryness, eye pain and floaters    Pain scale: 0/10              Comments     Pt here for annual f/u of pseudophakia of both eyes  Pt reports vision is stable - left eye is still not as good as the right eye typically (finds that she will occasionally close the left eye and rely on her right eye)  Still notes that the right eye is \"more dim\" and takes longer to adjust to different lighting - has had testing done for this and has noted for years but wants to report this is still noted  Also wonders about having lid surgery as feels it may be close to time    No drops - will very occasionally use an AT if needed    TYLER Crane 1:16 PM Honey 3, 2020                 "

## 2020-06-03 NOTE — PROGRESS NOTES
CC: s/p CE/IOL OU      HPI: 66 yo CF s/p CE/IOL OS. Surgery complicated by opening of RK wounds temporally. Resutured. Doing well, denies any pain, feels like vision is improving. No flashes, diplopia. She has noticed more floaters OD      Interval:  Doing well since last visit . Occasional dry eye symptoms. Using ATs occasionally with good relief. Vision has been stable, though she has noted progressive drooping of her upper eyelids that is beginning to affect her upper field of vision.     POHx:  H/o RK  S/p CE/IOL OS 2/20/18  S/p CE/IOL OD 3/13/18      Gtts:  PF AT PRN      Assessment / Plan:      1. S/p CE/IOL both eyes  - s/p yag cap OD    2. ABMD, each eye   -s/p suture removal left eye prev visit  -doing well today, using tears PRN only ( a few times weekly)  -refracts to 20/25 right eye, 20/30 left eye (this is where the vision was before the cataract surgery)- limited by corneal changes  -chato previously shows slightly more astigmastism left eye than last visit despite suture removal  -has central ABMD changes each eye, but surface looks good with no PEE or infiltrates    3. Subjective visual disturbance, both eyes  -occasional rare photopsias OU the past few weeks  -DFE today- mild ERM OU, no RT or RD  -has peripheral retinal degeneration and fam hx of retinal detachment- discussed increased risk and return precautions.     4. Dermatochalasis- bilateral  -subjectively beginning to affect upper field of vision  -consider referral to Dr Sanchez if progresses    5. Refractive error  -updated MRx dispensed 6/3/2020    --  Sunny Pulido MD  PGY 5, Cornea Fellow  Ophthalmology     Attending Physician Attestation:  Complete documentation of historical and exam elements from today's encounter can be found in the full encounter summary report (not reduplicated in this progress note).  I personally obtained the chief complaint(s) and history of present illness.  I confirmed and edited as necessary the review  of systems, past medical/surgical history, family history, social history, and examination findings as documented by others; and I examined the patient myself.  I personally reviewed the relevant tests, images, and reports as documented above.  I formulated and edited as necessary the assessment and plan and discussed the findings and management plan with the patient and family. - Tyson Ruby MD

## 2020-07-06 ENCOUNTER — TRANSFERRED RECORDS (OUTPATIENT)
Dept: HEALTH INFORMATION MANAGEMENT | Facility: CLINIC | Age: 70
End: 2020-07-06

## 2020-07-06 ENCOUNTER — THERAPY VISIT (OUTPATIENT)
Dept: PHYSICAL THERAPY | Facility: CLINIC | Age: 70
End: 2020-07-06
Payer: COMMERCIAL

## 2020-07-06 DIAGNOSIS — M54.16 LUMBAR RADICULOPATHY: ICD-10-CM

## 2020-07-06 PROCEDURE — 97162 PT EVAL MOD COMPLEX 30 MIN: CPT | Mod: GP | Performed by: PHYSICAL THERAPIST

## 2020-07-06 PROCEDURE — 97110 THERAPEUTIC EXERCISES: CPT | Mod: GP | Performed by: PHYSICAL THERAPIST

## 2020-07-06 NOTE — PROGRESS NOTES
[facility]: Physical Therapy Initial Evaluation   2020  Comments/Precautions/Restrictions/Physician instructions:   Per Orders: include cardio training, core strengthening,       Subjective:   Chief Complaint: Low Back Pain   Pain: across the low back, right more than left, sometimes just on the left. Had pain in the right shin.    Numbness/Tingling: paraesthesia on the right shin previously, not presently   Weakness: more of a general weakness with recent decrease in activity   Stiffness: in the morning or when getting up after sitting in a chair  New/Recurrent/Chronic: Recurrent increasing  DOI/onset: about 1 month ago   Referral Date: 2020 - Serge Ramirez MD   Mechanism of onset: scoliosis?, uncertain. 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  No acute osseous abnormality. Chronic T11 compression deformity.  2.  Multilevel degenerative changes most pronounced L3-S1.     2020 - Hip X-ray Impression:  1. Mild left hip degenerative change with preservation of joint space.  2. Osteitis pubis.    AM/PM: depends more on what she does  Quality of Pain: focal, throbbing, sometimes stabbing  Pain: 5/10 at present, 0/10 at best, 9/10 at worst  Worse: at night, stairs,   Better: see above  Progression of  Symptoms since onset: worse at times, not any better   Hx of Falls: about 1 week ago, had been falling about 4 times per week, is attending therapy for it.   Sleeping: wakes 1-2 times per night   Current Functional Status:   - walking - with walker, usually has a hand touch the wall when not with the walker.    - stairs - more painful up to 8/10  Previous Functional Status: no restrictions  Current HEP/exercise regimen: pilates, stationary bike, pool,   Transportation to Therapy: Independent with transportation  Live with Others: Lives alone, renter has a room a couple nights per week    Patient's goal(s): Get to the optimal point of activity with the least amount of pain.         Objective:    Posture: Shifted to the left. Significant increase in tone of the right lumbar paraspinals. Increased lordosis in standing    Gait: left hip drop, decreased push off on the left compared to the right.     Lumbar AROM: (Major, Moderate, Minimal or Nil loss)  Movement Loss Jean Carlos Mod Min Nil Pain   Flexion   x     Extension  x      Side Gliding L  x   cc   Side Gliding R  x   cc       SIJ Test Cluster:   Distraction: (-)  Thigh Thrust: (-)  Sacral Thrust: (-)    Other:   - Increase in pain with attempted lateral shift correction, may be due to underlying scoliosis.       Assessment/Plan:    Patient is a 70 year old female with lumbar complaints.    Patient has the following significant findings with corresponding treatment plan.                Referring Diagnosis: Lumbar radiculopathy   Pain -  hot/cold therapy, manual therapy, splint/taping/bracing/orthotics, self management, education and home program  Decreased ROM/flexibility - manual therapy, therapeutic exercise, therapeutic activity and home program  Impaired gait - gait training and home program  Decreased function - therapeutic activities and home program  Impaired posture - neuro re-education, therapeutic activities and home program        Therapy Evaluation Codes:    1) History comprised of:   Personal factors that impact the plan of care:      Education and Profession.    Comorbidity factors that impact the plan of care are:      Osteoarthritis, Overweight and Osteoporosis.     Medications impacting care: Anti-inflammatory, Muscle relaxant, Pain and Steroids.  2) Examination of Body Systems comprised of:   Body structures and functions that impact the plan of care:      Hip, Lumbar spine, Pelvis and Sacral illiac joint.   Activity limitations that impact the plan of care are:      Stairs, Walking and Sleeping.  3) Clinical presentation characteristics are:   Evolving/Changing.  4) Decision-Making    High complexity using standardized patient assessment instrument and/or measureable assessment of functional outcome.  Cumulative Therapy Evaluation is: Moderate complexity.    Previous and current functional limitations:  (See Goal Flow Sheet for this information)    Short term and Long term goals: (See Goal Flow Sheet for this information)     Communication ability:  Patient appears to be able to clearly communicate and understand verbal and written communication and follow directions correctly.  Treatment Explanation - The following has been discussed with the patient:   RX ordered/plan of care  Anticipated outcomes  Possible risks and side effects  This patient would benefit from PT intervention to resume normal activities.   Rehab potential is good.    Frequency:  1 X week, once daily  Duration:  for 8 weeks  Discharge Plan:  Achieve all LTG.  Independent in home treatment program.  Reach maximal therapeutic benefit.    Please refer to the daily flowsheet for treatment today, total treatment time and time spent performing 1:1 timed codes.

## 2020-07-06 NOTE — LETTER
SHARDA ALLRED ALLAN PT  [address] [address]  [address]  [place] [address]  [phone]    2020    Re: Nadira Perez   :   1950  MRN:  [id number]   REFERRING PHYSICIAN:   Serge LEMUS PT    Date of Initial Evaluation: 20  Visits:  Rxs Used: 1  Reason for Referral:  Lumbar radiculopathy    EVALUATION SUMMARY    [facility]: Physical Therapy Initial Evaluation   2020  Comments/Precautions/Restrictions/Physician instructions:   Per Orders: include cardio training, core strengthening,       Subjective:   Chief Complaint: Low Back Pain   Pain: across the low back, right more than left, sometimes just on the left. Had pain in the right shin.    Numbness/Tingling: paraesthesia on the right shin previously, not presently   Weakness: more of a general weakness with recent decrease in activity   Stiffness: in the morning or when getting up after sitting in a chair  New/Recurrent/Chronic: Recurrent increasing  DOI/onset: about 1 month ago   Referral Date: 2020 - Serge Ramirez MD   Mechanism of onset: scoliosis?, uncertain. 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  No acute osseous abnormality. Chronic T11  compression deformity.  2.  Multilevel degenerative changes most pronounced L3-S1.     2020 - Hip X-ray Impression:  1. Mild left hip degenerative change with preservation of joint space.  2. Osteitis pubis.    AM/PM: depends more on what she does  Quality of Pain: focal, throbbing, sometimes stabbing  Re: Nadira Perez   :   1950  Pain: 5/10 at present, 0/10 at best, 9/10 at worst  Worse: at night, stairs,   Better: see above  Progression of Symptoms since onset: worse at times, not any better   Hx of Falls: about 1 week ago, had been falling about 4 times per week, is attending therapy for it.   Sleeping: wakes 1-2 times per night   Current Functional Status:   - walking - with walker, usually has a hand touch the wall when not with the walker.    - stairs - more painful up to 8/10  Previous Functional Status: no restrictions  Current HEP/exercise regimen: pilates, stationary bike, pool,   Transportation to Therapy: Independent with transportation  Live with Others: Lives alone, renter has a room a couple nights per week    Patient's goal(s): Get to the optimal point of activity with the least amount of pain.     Objective:    Posture: Shifted to the left. Significant increase in tone of the right lumbar paraspinals. Increased lordosis in standing    Gait: left hip drop, decreased push off on the left compared to the right.     Lumbar AROM: (Major, Moderate, Minimal or Nil loss)  Movement Loss Jean Carlos Mod Min Nil Pain   Flexion   x     Extension  x      Side Gliding L  x   cc   Side Gliding R  x   cc       SIJ Test Cluster:   Distraction: (-)  Thigh Thrust: (-)  Sacral Thrust: (-)    Re: Nadira Perez   :   1950    Other:   - Increase in pain with attempted lateral shift correction, may be due to underlying scoliosis.       Assessment/Plan:    Patient is a 70 year old female with lumbar complaints.    Patient has the following significant findings with corresponding treatment plan.                 Referring Diagnosis: Lumbar radiculopathy   Pain -  hot/cold therapy, manual therapy, splint/taping/bracing/orthotics, self management, education and home program  Decreased ROM/flexibility - manual therapy, therapeutic exercise, therapeutic activity and home program  Impaired gait - gait training and home program  Decreased function - therapeutic activities and home program  Impaired posture - neuro re-education, therapeutic activities and home program        Therapy Evaluation Codes:   1) History comprised of:   Personal factors that impact the plan of care:      Education and Profession.    Comorbidity factors that impact the plan of care are:      Osteoarthritis, Overweight and Osteoporosis.     Medications impacting care: Anti-inflammatory, Muscle relaxant, Pain and Steroids.  2) Examination of Body Systems comprised of:   Body structures and functions that impact the plan of care:      Hip, Lumbar spine, Pelvis and Sacral illiac joint.   Activity limitations that impact the plan of care are:      Stairs, Walking and Sleeping.  3) Clinical presentation characteristics are:   Evolving/Changing.  4) Decision-Making    High complexity using standardized patient assessment instrument and/or measureable assessment of functional outcome.  Cumulative Therapy Evaluation is: Moderate complexity.    Previous and current functional limitations:  (See Goal Flow Sheet for this information)    Short term and Long term goals: (See Goal Flow Sheet for this information)     Communication ability:  Patient appears to be able to clearly communicate and understand verbal and written communication and follow directions correctly.  Treatment Explanation - The following has been discussed with the patient:   RX ordered/plan of care  Anticipated outcomes  Possible risks and side effects  This patient would benefit from PT intervention to resume normal activities.   Rehab potential is good.    Re: Nadira Perez   :    1950    Frequency:  1 X week, once daily  Duration:  for 8 weeks  Discharge Plan:  Achieve all LTG.  Independent in home treatment program.  Reach maximal therapeutic benefit.              Thank you for your referral.    INQUIRIES  Therapist: Jovon Zavaleta DPT, [facility] [place] [facility]  [address] [address] [address]  Phone: [phone]  Fax: [phone]

## 2020-07-07 ENCOUNTER — VIRTUAL VISIT (OUTPATIENT)
Dept: INTERNAL MEDICINE | Facility: CLINIC | Age: 70
End: 2020-07-07
Payer: COMMERCIAL

## 2020-07-07 DIAGNOSIS — S22.080A CLOSED WEDGE COMPRESSION FRACTURE OF ELEVENTH THORACIC VERTEBRA, INITIAL ENCOUNTER: Primary | ICD-10-CM

## 2020-07-07 DIAGNOSIS — R11.0 NAUSEA: ICD-10-CM

## 2020-07-07 DIAGNOSIS — T75.3XXA MOTION SICKNESS, INITIAL ENCOUNTER: ICD-10-CM

## 2020-07-07 DIAGNOSIS — H69.90 DYSFUNCTION OF EUSTACHIAN TUBE, UNSPECIFIED LATERALITY: ICD-10-CM

## 2020-07-07 DIAGNOSIS — M51.369 DDD (DEGENERATIVE DISC DISEASE), LUMBAR: ICD-10-CM

## 2020-07-07 DIAGNOSIS — T75.3XXD MOTION SICKNESS, SUBSEQUENT ENCOUNTER: ICD-10-CM

## 2020-07-07 DIAGNOSIS — E87.6 HYPOPOTASSEMIA: ICD-10-CM

## 2020-07-07 RX ORDER — CYCLOBENZAPRINE HCL 10 MG
10 TABLET ORAL 3 TIMES DAILY PRN
Qty: 40 TABLET | Refills: 1 | Status: SHIPPED | OUTPATIENT
Start: 2020-07-07 | End: 2020-09-15

## 2020-07-07 RX ORDER — ONDANSETRON 4 MG/1
4 TABLET, ORALLY DISINTEGRATING ORAL EVERY 8 HOURS PRN
Qty: 40 TABLET | Refills: 3 | Status: SHIPPED | OUTPATIENT
Start: 2020-07-07 | End: 2021-01-15

## 2020-07-07 RX ORDER — ONDANSETRON 4 MG/1
TABLET, FILM COATED ORAL
Qty: 40 TABLET | Refills: 1 | Status: SHIPPED | OUTPATIENT
Start: 2020-07-07 | End: 2020-07-07

## 2020-07-07 RX ORDER — POTASSIUM CHLORIDE 1500 MG/1
TABLET, EXTENDED RELEASE ORAL
Qty: 36 TABLET | Refills: 3 | Status: SHIPPED | OUTPATIENT
Start: 2020-07-07 | End: 2021-05-13

## 2020-07-07 RX ORDER — HYDROCODONE BITARTRATE AND ACETAMINOPHEN 5; 325 MG/1; MG/1
1 TABLET ORAL EVERY 6 HOURS PRN
Qty: 40 TABLET | Refills: 0 | Status: SHIPPED | OUTPATIENT
Start: 2020-07-07 | End: 2020-09-03

## 2020-07-07 NOTE — PROGRESS NOTES
"Nadira Perez is a 70 year old female who is being evaluated via a billable telephone visit.  This was suppose to be a video visit but unable to see pt and she is unable to see provider.    The patient has been notified of following:     \"This telephone visit will be conducted via a call between you and your physician/provider. We have found that certain health care needs can be provided without the need for a physical exam.  This service lets us provide the care you need with a short phone conversation.  If a prescription is necessary we can send it directly to your pharmacy.  If lab work is needed we can place an order for that and you can then stop by our lab to have the test done at a later time.    Telephone visits are billed at different rates depending on your insurance coverage. During this emergency period, for some insurers they may be billed the same as an in-person visit.  Please reach out to your insurance provider with any questions.    If during the course of the call the physician/provider feels a telephone visit is not appropriate, you will not be charged for this service.\"    Patient has given verbal consent for Telephone visit?  Yes    What phone number would you like to be contacted at? cell    How would you like to obtain your AVS? MyChart    Subjective     Nadira Perez is a 70 year old female who presents via phone visit today for the following health issues:  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     Hypopotassemia     Hyperlipidemia     Murmurs     Nephrolithiasis     Osteoarthrosis, hand     Personal history of other malignant neoplasm of skin     Inflamed seborrheic keratosis     Essential hypertension, benign     Osteoporosis     Mechanical problems with limbs     Hypovitaminosis D     Contact dermatitis and other eczema, due to unspecified cause     Dermatitis     Anterior basement membrane " dystrophy - Both Eyes     Corneal opacity     Hypothyroidism     Osteopenia, unspecified location     Aromatase inhibitor use     Chronic pain of right knee     DDD (degenerative disc disease), lumbar     Degenerative scoliosis in adult patient       HPI  Ismael Perez has several issues to discuss.     She is currently experiencing back pain.  She saw Dr. Serge Ramirez in Ortho and was told she was developing a spinal deformity/curvature from her OA/Osteoporosis. She has a T 11 anterior compression deformity as well as osteoarthritic changes in her lumbar spine with a L4 compression fx.. She was informed that there was no surgical indication and was referred to PT, her first visit was yesterday, which with Covid she has been able to attend just one visit so far but intends of going back.  She noted her back pain exacerbated after her gym closed with [employer]. She had been performing pilotes there. She was told her paraspinal muscles were very tight and she was advised to use muscle relaxers. Se has been using ibuprofen for pain control.     Osteoarthritic pain in hands, wrists, knees she uses occasional Norco and would like a refill of that.  She uses this sparingly.     She is still having instability with walking and experiencing motion sickness symptoms with changing of her head position.  She had to stop the vestibular exercises because she would become so nauseous. She is being evaluated by Neuro and has an MRI scheduled for further evaluation. Recommendations by Neurology were:  Recommendations:  1. MRI brain to confirm the medullary lesion  2. Garcia-Daroff exercises three times a day for possible BPPV  3. Continue habituation exercises for motion sickness and head movement intolerance  4. Can do ondansetron prior to vestibular therapy    Her insurance company denied her Ondansetron.     She is concerned because her potassium is often below the normal range.  She has been eating bananas but requests for  oral supplement she could take three times a week so she doesn't have to eat bananas.    She states her weight is down to 179.  She has been trying to be active.       Review of Systems      CONSTITUTIONAL: no fatigue.  She has lost some weight  EYES: no acute vision problems or changes  ENT: no ear problems, no mouth problems, no throat problems  RESP: no significant cough, no shortness of breath  CV: no chest pain, no palpitations, no new or worsening peripheral edema  GI: nausea when performing positional exercises.  MUSCULOSKELETAL:hand, wrist, knee and back pain.  NEURO:dizziness, has had some falls.   ENDOCRINE: no temperature intolerance, no skin/hair changes  PSYCHIATRIC: NEGATIVE for changes in mood or affect       Objective   Reported vitals:  There were no vitals taken for this visit.     PSYCH: Alert and oriented times 3; coherent speech, normal   rate and volume, able to articulate logical thoughts, able   to abstract reason.Her affect is normal and pleasant  RESP: No cough, no audible wheezing, able to talk in full sentences  Remainder of exam unable to be completed due to telephone visits  Neuro: grossly normal.      Labs frResults for BRITTNEY STILES (MRN [id number]) as of 7/7/2020 18:07   Ref. Range 5/12/2020 11:18   Sodium Latest Ref Range: 133 - 144 mmol/L 139   Potassium Latest Ref Range: 3.4 - 5.3 mmol/L 3.7   Chloride Latest Ref Range: 94 - 109 mmol/L 106   Carbon Dioxide Latest Ref Range: 20 - 32 mmol/L 28   Urea Nitrogen Latest Ref Range: 7 - 30 mg/dL 16   Creatinine Latest Ref Range: 0.52 - 1.04 mg/dL 0.59   GFR Estimate Latest Ref Range: >60 mL/min/1.73_m2 >90   GFR Estimate If Black Latest Ref Range: >60 mL/min/1.73_m2 >90   Calcium Latest Ref Range: 8.5 - 10.1 mg/dL 8.9   Anion Gap Latest Ref Range: 3 - 14 mmol/L 5   Albumin Latest Ref Range: 3.4 - 5.0 g/dL 4.1   Protein Total Latest Ref Range: 6.8 - 8.8 g/dL 7.5   Bilirubin Total Latest Ref Range: 0.2 - 1.3 mg/dL 0.7   Alkaline  Phosphatase Latest Ref Range: 40 - 150 U/L 75   ALT Latest Ref Range: 0 - 50 U/L 26   AST Latest Ref Range: 0 - 45 U/L 34   Vitamin D Deficiency screening Latest Ref Range: 20 - 75 ug/L 56   Glucose Latest Ref Range: 70 - 99 mg/dL 112 (H)   WBC Latest Ref Range: 4.0 - 11.0 10e9/L 7.8   Hemoglobin Latest Ref Range: 11.7 - 15.7 g/dL 12.3   Hematocrit Latest Ref Range: 35.0 - 47.0 % 39.2   Platelet Count Latest Ref Range: 150 - 450 10e9/L 255   RBC Count Latest Ref Range: 3.8 - 5.2 10e12/L 4.28   MCV Latest Ref Range: 78 - 100 fl 92   MCH Latest Ref Range: 26.5 - 33.0 pg 28.7   MCHC Latest Ref Range: 31.5 - 36.5 g/dL 31.4 (L)   RDW Latest Ref Range: 10.0 - 15.0 % 14.1   Diff Method Unknown Automated Method   % Neutrophils Latest Units: % 65.0   % Lymphocytes Latest Units: % 20.1   % Monocytes Latest Units: % 7.3   % Eosinophils Latest Units: % 6.1   % Basophils Latest Units: % 0.6   % Immature Granulocytes Latest Units: % 0.9   Nucleated RBCs Latest Ref Range: 0 /100 0   Absolute Neutrophil Latest Ref Range: 1.6 - 8.3 10e9/L 5.0   Absolute Lymphocytes Latest Ref Range: 0.8 - 5.3 10e9/L 1.6   Absolute Monocytes Latest Ref Range: 0.0 - 1.3 10e9/L 0.6   Absolute Eosinophils Latest Ref Range: 0.0 - 0.7 10e9/L 0.5   Absolute Basophils Latest Ref Range: 0.0 - 0.2 10e9/L 0.1   Abs Immature Granulocytes Latest Ref Range: 0 - 0.4 10e9/L 0.1   Absolute Nucleated RBC Unknown 0.0   om May:      Assessment/Plan:Nadira was seen today for back pain and recheck medication.    Diagnoses and all orders for this visit:    Closed wedge compression fracture of eleventh thoracic vertebra, initial encounter (H)  -     cyclobenzaprine (FLEXERIL) 10 MG tablet; Take 1 tablet (10 mg) by mouth 3 times daily as needed for muscle spasms  -     HYDROcodone-acetaminophen (NORCO) 5-325 MG tablet; Take 1 tablet by mouth every 6 hours as needed    Hypopotassemia  -     potassium chloride ER (KLOR-CON M) 20 MEQ CR tablet; Take 20 meq  Three times a  week.        Motion sickness, initial encounter  -     ondansetron (ZOFRAN-ODT) 4 MG ODT tab; Take 1 tablet (4 mg) by mouth every 8 hours as needed for nausea      Phone call duration:  30 minutes. minutes    Matilde LONG, CNP

## 2020-07-07 NOTE — NURSING NOTE
Chief Complaint   Patient presents with     Back Pain     follow up on back pain     Recheck Medication     balance issues. renew medications     Kimberly Nissen, EMT at 12:52 PM on 7/7/2020    Video Visit Technology for this patient: Harpreet not working, patient has smart device, please try Doximity Video with patient

## 2020-07-08 ENCOUNTER — TELEPHONE (OUTPATIENT)
Dept: INTERNAL MEDICINE | Facility: CLINIC | Age: 70
End: 2020-07-08

## 2020-07-08 PROBLEM — M54.16 LUMBAR RADICULOPATHY: Status: ACTIVE | Noted: 2020-07-08

## 2020-07-13 ENCOUNTER — THERAPY VISIT (OUTPATIENT)
Dept: PHYSICAL THERAPY | Facility: CLINIC | Age: 70
End: 2020-07-13
Payer: COMMERCIAL

## 2020-07-13 DIAGNOSIS — M54.16 LUMBAR RADICULOPATHY: ICD-10-CM

## 2020-07-13 PROCEDURE — 97110 THERAPEUTIC EXERCISES: CPT | Mod: GP | Performed by: PHYSICAL THERAPY ASSISTANT

## 2020-07-17 ENCOUNTER — HOSPITAL ENCOUNTER (OUTPATIENT)
Dept: MRI IMAGING | Facility: CLINIC | Age: 70
Discharge: HOME | End: 2020-07-17
Attending: PSYCHIATRY & NEUROLOGY | Admitting: PSYCHIATRY & NEUROLOGY
Payer: COMMERCIAL

## 2020-07-17 DIAGNOSIS — G93.9 BRAINSTEM LESION: ICD-10-CM

## 2020-07-17 DIAGNOSIS — R26.89 IMBALANCE: ICD-10-CM

## 2020-07-17 DIAGNOSIS — R42 DIZZINESS: ICD-10-CM

## 2020-07-17 PROCEDURE — A9585 GADOBUTROL INJECTION: HCPCS | Performed by: PSYCHIATRY & NEUROLOGY

## 2020-07-17 PROCEDURE — 25500064 ZZH RX 255 OP 636: Performed by: PSYCHIATRY & NEUROLOGY

## 2020-07-17 PROCEDURE — 70553 MRI BRAIN STEM W/O & W/DYE: CPT

## 2020-07-17 RX ORDER — GADOBUTROL 604.72 MG/ML
10 INJECTION INTRAVENOUS ONCE
Status: COMPLETED | OUTPATIENT
Start: 2020-07-17 | End: 2020-07-17

## 2020-07-17 RX ADMIN — GADOBUTROL 8 ML: 604.72 INJECTION INTRAVENOUS at 14:40

## 2020-07-20 ENCOUNTER — THERAPY VISIT (OUTPATIENT)
Dept: PHYSICAL THERAPY | Facility: CLINIC | Age: 70
End: 2020-07-20
Payer: COMMERCIAL

## 2020-07-20 DIAGNOSIS — M54.16 LUMBAR RADICULOPATHY: ICD-10-CM

## 2020-07-20 PROCEDURE — 97110 THERAPEUTIC EXERCISES: CPT | Mod: GP | Performed by: PHYSICAL THERAPY ASSISTANT

## 2020-07-23 DIAGNOSIS — I10 BENIGN ESSENTIAL HYPERTENSION: ICD-10-CM

## 2020-07-27 ENCOUNTER — THERAPY VISIT (OUTPATIENT)
Dept: PHYSICAL THERAPY | Facility: CLINIC | Age: 70
End: 2020-07-27
Payer: COMMERCIAL

## 2020-07-27 DIAGNOSIS — M54.16 LUMBAR RADICULOPATHY: ICD-10-CM

## 2020-07-27 PROCEDURE — 97530 THERAPEUTIC ACTIVITIES: CPT | Mod: GP | Performed by: PHYSICAL THERAPY ASSISTANT

## 2020-07-27 RX ORDER — LISINOPRIL 40 MG/1
40 TABLET ORAL DAILY
Qty: 90 TABLET | Refills: 0 | Status: SHIPPED | OUTPATIENT
Start: 2020-07-27 | End: 2020-10-22

## 2020-07-27 ASSESSMENT — ENCOUNTER SYMPTOMS
BLOATING: 1
TINGLING: 1
DOUBLE VISION: 0
EXERCISE INTOLERANCE: 1
DISTURBANCES IN COORDINATION: 1
SINUS PAIN: 0
SEIZURES: 0
DIZZINESS: 1
HYPERTENSION: 1
EYE PAIN: 0
BRUISES/BLEEDS EASILY: 0
TROUBLE SWALLOWING: 1
SYNCOPE: 0
HOT FLASHES: 0
NECK MASS: 0
COUGH: 0
BLOOD IN STOOL: 0
DIFFICULTY URINATING: 1
SWOLLEN GLANDS: 0
STIFFNESS: 1
LIGHT-HEADEDNESS: 1
EYE REDNESS: 0
POSTURAL DYSPNEA: 0
NUMBNESS: 1
MEMORY LOSS: 0
WEAKNESS: 0
FLANK PAIN: 0
JAUNDICE: 0
ORTHOPNEA: 0
ARTHRALGIAS: 1
DYSPNEA ON EXERTION: 0
NECK PAIN: 1
EYE WATERING: 0
COUGH DISTURBING SLEEP: 0
EYE IRRITATION: 1
MUSCLE CRAMPS: 1
SORE THROAT: 0
BACK PAIN: 1
SLEEP DISTURBANCES DUE TO BREATHING: 0
BOWEL INCONTINENCE: 0
NAUSEA: 1
SMELL DISTURBANCE: 0
HYPOTENSION: 0
ABDOMINAL PAIN: 0
MUSCLE WEAKNESS: 0
RECTAL PAIN: 0
WHEEZING: 0
SHORTNESS OF BREATH: 0
TREMORS: 0
HEMOPTYSIS: 0
MYALGIAS: 1
LEG PAIN: 1
SPEECH CHANGE: 0
JOINT SWELLING: 0
CONSTIPATION: 0
LOSS OF CONSCIOUSNESS: 0
HEARTBURN: 0
TASTE DISTURBANCE: 0
SNORES LOUDLY: 1
PALPITATIONS: 1
DIARRHEA: 0
HEMATURIA: 0
PARALYSIS: 0
DYSURIA: 0
VOMITING: 1
HEADACHES: 0
SPUTUM PRODUCTION: 0
SINUS CONGESTION: 1
HOARSE VOICE: 0
DECREASED LIBIDO: 1

## 2020-07-27 NOTE — TELEPHONE ENCOUNTER
lisinopril (ZESTRIL) 40 MG tablet       Last Written Prescription Date:  10/22/19  Last Fill Quantity: 90,   # refills: 2  Last Virtual Visit : 7/7/20  Future Office visit:  None scheduled    Routing refill request to provider for review/approval because:  Blood pressure out of range     BP Readings from Last 3 Encounters:   05/12/20 (!) 146/86   11/11/19 132/78   11/11/19 132/78     Per protocol, 90 day refill provided, routed to provider for follow up

## 2020-07-28 NOTE — PROGRESS NOTES
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 The medullary lesion is stable and she has no evidence of BPPV. She would benefit from vestibular rehabilitation.  Will also check neck ultrasound (carotid and jugular) for the positional symptoms.    Recommendations:  1. Ultrasound neck  2. Complete peripheral neuropathy lab evaluation.  She is has mild insulin resistance which may be a cause.  TSH has been normal.  She had a very elevated B12 in 2017, which can be an acute phase reactant.  We will repeat that.    3. Vestibular rehabilitation  4. F/U after imaging    HISTORY OF PRESENT ILLNESS:  Nadira Perez is a 69 year old woman with a history of breast cancer status post chemotherapy, peripheral neuropathy, and a lower medullary brain lesion, who had presented on 4-22-20 with chronically progressive gait imbalance and head motion induced nausea.   We are now following up with an in-person appointment after her MRI brain.      The patient notes that she has been doing somewhat better with balance and has not had any falls.  She has been using a 4 wheeled walker for distances and also because she has 2 compression fractures in her back that caused quite a bit of pain.  She has been doing physical therapy for that.  She was doing Pilates before COVID and is somewhat worse from a pain standpoint after stopping Pilates.  She does note that if she turns too quickly she will stumble and she can feel motion sick with rapid head movements.  She notes that doing vestibular rehabilitation exercises made  her nauseated through the rest of the day.  She also notes that her feet feel like rubber.  She was diagnosed with a peripheral neuropathy.  She denies any head pressure or any headache.  She denies pressure in the eyes or ears.      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  She also had small vessel disease.    ALLERGIES:     Allergies   Allergen Reactions     Penicillins Hives     MEDICATIONS:    Current Outpatient Medications:      Acetaminophen (APAP 500 PO), Take 1,000 mg by mouth as needed , Disp: , Rfl:      amLODIPine (NORVASC) 10 MG tablet, Take 1 tablet (10 mg) by mouth daily, Disp: 90 tablet, Rfl: 3     anastrozole (ARIMIDEX) 1 MG tablet, TAKE 1 TABLET BY MOUTH EVERY DAY, Disp: 90 tablet, Rfl: 3     atorvastatin (LIPITOR) 40 MG tablet, Take 1 tablet (40 mg) by mouth daily Please put on profile- pt just received 90-day supply of cholesterol meds, Disp: 90 tablet, Rfl: 3     cholecalciferol 2000 units CAPS, Take 6,000 Units by mouth daily, Disp: 270 capsule, Rfl: 3     Coenzyme Q10 (COQ10) 200 MG CAPS, Take 1 capsule by mouth daily, Disp: , Rfl:      cyclobenzaprine (FLEXERIL) 10 MG tablet, Take 1 tablet (10 mg) by mouth 3 times daily as needed for muscle spasms, Disp: 40 tablet, Rfl: 1     gabapentin (NEURONTIN) 300 MG capsule, Take 1-2 capsules by mouth every 8 hours, Disp: 540 capsule, Rfl: 3     glucosamine-chondroitin 500-400 MG CAPS per capsule, Take 1 capsule by mouth 2 times daily , Disp: , Rfl:      HYDROcodone-acetaminophen (NORCO) 5-325 MG tablet, Take 1 tablet by mouth every 6 hours as needed, Disp: 40 tablet, Rfl: 0     ibuprofen (ADVIL/MOTRIN) 600 MG tablet, Take 600 mg by mouth every 6 hours as needed for moderate pain, Disp: , Rfl:      ketoconazole (NIZORAL) 2 % external shampoo, Apply topically daily as needed for itching or irritation, Disp: 120 mL, Rfl: 11     levothyroxine (SYNTHROID/LEVOTHROID) 112 MCG tablet, TAKE 1/2 TABLET BY  MOUTH DAILY, Disp: 45 tablet, Rfl: 3     lisinopril (ZESTRIL) 40 MG tablet, Take 1 tablet (40 mg) by mouth daily, Disp: 90 tablet, Rfl: 0     LORazepam (ATIVAN) 2 MG tablet, Take 1 tablet at night for sleep, Disp: 30 tablet, Rfl: 1     metoprolol succinate ER (TOPROL-XL) 50 MG 24 hr tablet, Take 1 tablet (50 mg) by mouth daily, Disp: 90 tablet, Rfl: 1     Multiple Vitamin (MULTI-VITAMIN) per tablet, Take 1 tablet by mouth daily., Disp: , Rfl:      Omega-3 Fatty Acids (OMEGA-3 FISH OIL PO), Take 1 capsule by mouth daily , Disp: , Rfl:      ondansetron (ZOFRAN-ODT) 4 MG ODT tab, Take 1 tablet (4 mg) by mouth every 8 hours as needed for nausea, Disp: 40 tablet, Rfl: 3     order for DME, Equipment being ordered: sleeve/gauntlet/glove 20-40 mmHg, lymphedema bandaging supplies, Disp: 1 each, Rfl: 12     order for DME, Equipment being ordered: prosthetic breasts, Disp: 2 each, Rfl: 12     potassium chloride ER (KLOR-CON M) 20 MEQ CR tablet, Take 20 meq  Three times a week., Disp: 36 tablet, Rfl: 3     terbinafine (LAMISIL AT) 1 % external cream, Apply topically 2 times daily For fungal infection not resolved with other antifungals (e.g. Clotrimazole), Disp: 24 g, Rfl: 11     triamcinolone (KENALOG) 0.1 % external ointment, Apply topically 2 times daily, Disp: 30 g, Rfl: 11     triamterene-HCTZ (MAXZIDE-25) 37.5-25 MG tablet, Take 1 tablet by mouth daily, Disp: 90 tablet, Rfl: 3     Vaginal Lubricant (REPHRESH) GEL, Place 2 g vaginally every 3 days, Disp: 14 Box, Rfl: 3     VOLTAREN 1 % topical gel, Apply 4 grams to knees or 2 grams to hands four times daily using enclosed dosing card., Disp: 100 g, Rfl: 1     PHYSICAL EXAM:  Vitals: /86   Pulse 82   Temp 99.1  F (37.3  C)   SpO2 97%     General: Patient is well-nourished, well-groomed, in no apparent distress.  She is here alone.  She brings a walker.     HEENT: Head is atraumatic, eyes look normal exteriorly, throat clear, neck supple.  Ext: Warm, well-perfused.   She has pitting edema in her ankles.  There is an indentation around her socks.  There is also some swelling in her left wrist and arms.  There are some prominent veins in the left wrist.    Neurologic:  Mental Status: Alert and oriented to person, place, time, and situation.  Able to provide and excellent history.     Cranial Nerves: Visual fields full to confrontation.  Pupils equal and reactive to light.  Extraocular movements full.  Face sensation normal.  Normal head impulse testing.  Normal hearing to finger rub. Face symmetric with normal movements. Tongue and palate midline.  Normal shoulder elevation.      Motor: Normal bulk and tone.  No pronator drift.  Normal foot taps.  Full strength to confrontational testing.    Sensory: Normal light touch, temperature, and vibration in the arms.  There is decreased vibratory sensation to the distal part of the lower leg.    Reflexes: Biceps, Brachioradialis, Triceps are 1/2, Knees and Ankles are trace.     Coordination: Normal finger to nose, rapid alternating movements    Gait: Can take a few steps without the walker only. Her initiation is good.  She can get up from sitting without difficulty.        Focused Vestibular:  Head Impulse testing:  No catch-up saccades  Positional Testing: Patient was placed in the supine, right ear down, left ear down, right head-hanging, center-hanging, and left head-hanging positions.  There was no nystagmus or vertigo elicited.  She did feel lightheaded on getting back up.       DATA:  All available and relevant labs, imaging, and other procedures were reviewed personally.   Last brain imaging:  MRI BRAIN WITHOUT AND WITH CONTRAST  7/17/2020 3:28 PM     HISTORY: CNS metastatic lesions suspected, initial workup. 69-year-old  female with previously noted lower medullary lesion on 2017 who has  progressive imbalance and dizziness. Imbalance. Dizziness. Brainstem  lesion.     TECHNIQUE:  Multiplanar, multisequence MRI of the brain  without and  with 8 mL Gadavist     COMPARISON: 4/21/2017     FINDINGS: Diffusion-weighted images are normal. There is no evidence  for intracranial hemorrhage or acute infarct. There is a minimal  presumed calcification along the left tentorial leaf. This is not  associated with any mass effect and appears benign. This was present  on the prior exam and is unchanged. There is also a small amount of  hemosiderin in the medulla with some minimal enhancement but no T2  signal abnormality. This is probably due to a capillary  telangiectasia. This is also unchanged from the prior exam. There is  some mild cerebral atrophy. Minimal nonspecific white matter changes  without mass effect or enhancement are present. Postcontrast images do  not show any other abnormal areas of enhancement or any focal mass  lesions.                                                               IMPRESSION:  1. No evidence for metastatic disease.  2. 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  3. Scattered nonspecific white matter changes which are nonspecific  but probably due to chronic small vessel ischemic disease.     EUSEBIO BRANDT MD    Last audiogram:    Last ENG/VNG:    Recent Labs   Lab Test 05/12/20  1118   WBC 7.8   RBC 4.28   HGB 12.3   HCT 39.2   MCV 92   MCH 28.7   MCHC 31.4*   RDW 14.1          Recent Labs   Lab Test 05/12/20  1118 11/11/19  1510     --    POTASSIUM 3.7  --    CHLORIDE 106  --    RAMBO 8.9  --    CO2 28  --    BUN 16  --    CR 0.59  --    *  --    TSH  --  1.91       25-minutes spent in evaluation, examination, and counseling with over 50% spent on counseling.    Answers for HPI/ROS submitted by the patient on 7/27/2020   General Symptoms: No  Skin Symptoms: No  HENT Symptoms: Yes  EYE SYMPTOMS: Yes  HEART SYMPTOMS: Yes  LUNG SYMPTOMS: Yes  INTESTINAL SYMPTOMS: Yes  URINARY SYMPTOMS: Yes  GYNECOLOGIC SYMPTOMS: Yes  BREAST SYMPTOMS: No  SKELETAL SYMPTOMS:  Yes  BLOOD SYMPTOMS: Yes  NERVOUS SYSTEM SYMPTOMS: Yes  MENTAL HEALTH SYMPTOMS: No  Ear pain: No  Ear discharge: No  Hearing loss: Yes  Tinnitus: Yes  Nosebleeds: No  Congestion: Yes  Sinus pain: No  Trouble swallowing: Yes   Voice hoarseness: No  Mouth sores: Yes  Sore throat: No  Tooth pain: No  Gum tenderness: Yes  Bleeding gums: No  Change in taste: No  Change in sense of smell: No  Dry mouth: Yes  Hearing aid used: Yes  Neck lump: No  Eye pain: No  Vision loss: No  Dry eyes: Yes  Watery eyes: No  Eye bulging: No  Double vision: No  Flashing of lights: No  Spots: No  Floaters: Yes  Redness: No  Crossed eyes: No  Tunnel Vision: No  Yellowing of eyes: No  Eye irritation: Yes  Cough: No  Sputum or phlegm: No  Coughing up blood: No  Difficulty breating or shortness of breath: No  Snoring: Yes  Wheezing: No  Difficulty breathing on exertion: No  Nighttime Cough: No  Difficulty breathing when lying flat: No  Chest pain or pressure: No  Fast or irregular heartbeat: Yes  Pain in legs with walking: Yes  Trouble breathing while lying down: No  Fingers or toes appear blue: No  High blood pressure: Yes  Low blood pressure: No  Fainting: No  Murmurs: No  Pacemaker: No  Varicose veins: No  Edema or swelling: Yes  Wake up at night with shortness of breath: No  Light-headedness: Yes  Exercise intolerance: Yes  Heart burn or indigestion: No  Nausea: Yes  Vomiting: Yes  Abdominal pain: No  Bloating: Yes  Constipation: No  Diarrhea: No  Blood in stool: No  Black stools: No  Rectal or Anal pain: No  Fecal incontinence: No  Yellowing of skin or eyes: No  Vomit with blood: No  Change in stools: No  Trouble holding urine or incontinence: Yes  Pain or burning: No  Trouble starting or stopping: Yes  Increased frequency of urination: No  Blood in urine: No  Decreased frequency of urination: No  Frequent nighttime urination: No  Flank pain: No  Difficulty emptying bladder: Yes  Back pain: Yes  Muscle aches: Yes  Neck pain: Yes  Swollen  joints: No  Joint pain: Yes  Bone pain: Yes  Muscle cramps: Yes  Muscle weakness: No  Joint stiffness: Yes  Bone fracture: Yes  Anemia: No  Swollen glands: No  Easy bleeding or bruising: No  Trouble with coordination: Yes  Dizziness or trouble with balance: Yes  Fainting or black-out spells: No  Memory loss: No  Headache: No  Seizures: No  Speech problems: No  Tingling: Yes  Tremor: No  Weakness: No  Difficulty walking: Yes  Paralysis: No  Numbness: Yes  Bleeding or spotting between periods: No  Heavy or painful periods: No  Irregular periods: No  Vaginal discharge: No  Hot flashes: No  Vaginal dryness: Yes  Genital ulcers: No  Reduced libido: Yes  Painful intercourse: No  Difficulty with sexual arousal: Yes  Post-menopausal bleeding: No

## 2020-07-29 ENCOUNTER — TELEPHONE (OUTPATIENT)
Dept: ORTHOPEDICS | Facility: CLINIC | Age: 70
End: 2020-07-29

## 2020-07-29 ENCOUNTER — OFFICE VISIT (OUTPATIENT)
Dept: NEUROLOGY | Facility: CLINIC | Age: 70
End: 2020-07-29
Payer: COMMERCIAL

## 2020-07-29 VITALS
DIASTOLIC BLOOD PRESSURE: 86 MMHG | SYSTOLIC BLOOD PRESSURE: 134 MMHG | OXYGEN SATURATION: 97 % | HEART RATE: 82 BPM | TEMPERATURE: 99.1 F

## 2020-07-29 DIAGNOSIS — R42 DIZZINESS: ICD-10-CM

## 2020-07-29 DIAGNOSIS — R26.89 IMBALANCE: ICD-10-CM

## 2020-07-29 DIAGNOSIS — G62.9 PERIPHERAL POLYNEUROPATHY: ICD-10-CM

## 2020-07-29 DIAGNOSIS — G93.9 BRAINSTEM LESION: ICD-10-CM

## 2020-07-29 DIAGNOSIS — R11.0 NAUSEA: Primary | ICD-10-CM

## 2020-07-29 RX ORDER — PHENOL 1.4 %
10 AEROSOL, SPRAY (ML) MUCOUS MEMBRANE AT BEDTIME
COMMUNITY
Start: 2020-01-01

## 2020-07-29 ASSESSMENT — PAIN SCALES - GENERAL: PAINLEVEL: EXTREME PAIN (8)

## 2020-07-29 NOTE — TELEPHONE ENCOUNTER
Nadira arrived at the [facility] early for another appt and was hoping she could be seen at the walk in clinic before her other scheduled appointment.  I unfortunately let her know that we are totally booked up for the end of the day. She was happy to schedule for tomorrow.     I helped her find an appt for 10 AM tomorrow to discuss her ongoing low back pain.    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     - Deonte FLORES ATC

## 2020-07-29 NOTE — LETTER
7/29/2020       RE: Nadira Perez  [id number]  [facility] [address]     Dear Colleague,    Thank you for referring your patient, Nadira Perez, to the [facility] NEUROLOGY at [facility]. Please see a copy of my visit note below.    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 The medullary lesion is stable and she has no evidence of BPPV. She would benefit from vestibular rehabilitation.  Will also check neck ultrasound (carotid and jugular) for the positional symptoms.    Recommendations:  1. Ultrasound neck  2. Complete peripheral neuropathy lab evaluation.  She is has mild insulin resistance which may be a cause.  TSH has been normal.  She had a very elevated B12 in 2017, which can be an acute phase reactant.  We will repeat that.    3. Vestibular rehabilitation  4. F/U after imaging    HISTORY OF PRESENT ILLNESS:  Nadira Perez is a 69 year old woman with a history of breast cancer status post chemotherapy, peripheral neuropathy, and a lower medullary brain lesion, who had presented on 4-22-20 with chronically progressive gait imbalance and head motion induced nausea.   We are now following up with an in-person appointment after her MRI brain.      The patient notes that she has been doing somewhat better with balance and has not had any falls.  She has been using a 4 wheeled walker for distances and also because she has 2 compression fractures in her back that caused quite a bit of pain.  She has been doing physical therapy for that.   She was doing Pilates before COVID and is somewhat worse from a pain standpoint after stopping Pilates.  She does note that if she turns too quickly she will stumble and she can feel motion sick with rapid head movements.  She notes that doing vestibular rehabilitation exercises made her nauseated through the rest of the day.  She also notes that her feet feel like rubber.  She was diagnosed with a peripheral neuropathy.  She denies any head pressure or any headache.  She denies pressure in the eyes or ears.      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  She also had small vessel disease.    ALLERGIES:     Allergies   Allergen Reactions     Penicillins Hives     MEDICATIONS:    Current Outpatient Medications:      Acetaminophen (APAP 500 PO), Take 1,000 mg by mouth as needed , Disp: , Rfl:      amLODIPine (NORVASC) 10 MG tablet, Take 1 tablet (10 mg) by mouth daily, Disp: 90 tablet, Rfl: 3     anastrozole (ARIMIDEX) 1 MG tablet, TAKE 1 TABLET BY MOUTH EVERY DAY, Disp: 90 tablet, Rfl: 3     atorvastatin (LIPITOR) 40 MG tablet, Take 1 tablet (40 mg) by mouth daily Please put on profile- pt just received 90-day supply of cholesterol meds, Disp: 90 tablet, Rfl: 3     cholecalciferol 2000 units CAPS, Take 6,000 Units by mouth daily, Disp: 270 capsule, Rfl: 3     Coenzyme Q10 (COQ10) 200 MG CAPS, Take 1 capsule by mouth daily, Disp: , Rfl:      cyclobenzaprine (FLEXERIL) 10 MG tablet, Take 1 tablet (10 mg) by mouth 3 times daily as needed for muscle spasms, Disp: 40 tablet, Rfl: 1     gabapentin (NEURONTIN) 300 MG capsule, Take 1-2 capsules by mouth every 8 hours, Disp: 540 capsule, Rfl: 3     glucosamine-chondroitin 500-400 MG CAPS per capsule, Take 1 capsule by mouth 2 times daily , Disp: , Rfl:      HYDROcodone-acetaminophen (NORCO) 5-325 MG tablet, Take 1 tablet by mouth every 6 hours as needed, Disp: 40 tablet, Rfl: 0     ibuprofen (ADVIL/MOTRIN) 600 MG  tablet, Take 600 mg by mouth every 6 hours as needed for moderate pain, Disp: , Rfl:      ketoconazole (NIZORAL) 2 % external shampoo, Apply topically daily as needed for itching or irritation, Disp: 120 mL, Rfl: 11     levothyroxine (SYNTHROID/LEVOTHROID) 112 MCG tablet, TAKE 1/2 TABLET BY MOUTH DAILY, Disp: 45 tablet, Rfl: 3     lisinopril (ZESTRIL) 40 MG tablet, Take 1 tablet (40 mg) by mouth daily, Disp: 90 tablet, Rfl: 0     LORazepam (ATIVAN) 2 MG tablet, Take 1 tablet at night for sleep, Disp: 30 tablet, Rfl: 1     metoprolol succinate ER (TOPROL-XL) 50 MG 24 hr tablet, Take 1 tablet (50 mg) by mouth daily, Disp: 90 tablet, Rfl: 1     Multiple Vitamin (MULTI-VITAMIN) per tablet, Take 1 tablet by mouth daily., Disp: , Rfl:      Omega-3 Fatty Acids (OMEGA-3 FISH OIL PO), Take 1 capsule by mouth daily , Disp: , Rfl:      ondansetron (ZOFRAN-ODT) 4 MG ODT tab, Take 1 tablet (4 mg) by mouth every 8 hours as needed for nausea, Disp: 40 tablet, Rfl: 3     order for DME, Equipment being ordered: sleeve/gauntlet/glove 20-40 mmHg, lymphedema bandaging supplies, Disp: 1 each, Rfl: 12     order for DME, Equipment being ordered: prosthetic breasts, Disp: 2 each, Rfl: 12     potassium chloride ER (KLOR-CON M) 20 MEQ CR tablet, Take 20 meq  Three times a week., Disp: 36 tablet, Rfl: 3     terbinafine (LAMISIL AT) 1 % external cream, Apply topically 2 times daily For fungal infection not resolved with other antifungals (e.g. Clotrimazole), Disp: 24 g, Rfl: 11     triamcinolone (KENALOG) 0.1 % external ointment, Apply topically 2 times daily, Disp: 30 g, Rfl: 11     triamterene-HCTZ (MAXZIDE-25) 37.5-25 MG tablet, Take 1 tablet by mouth daily, Disp: 90 tablet, Rfl: 3     Vaginal Lubricant (REPHRESH) GEL, Place 2 g vaginally every 3 days, Disp: 14 Box, Rfl: 3     VOLTAREN 1 % topical gel, Apply 4 grams to knees or 2 grams to hands four times daily using enclosed dosing card., Disp: 100 g, Rfl: 1     PHYSICAL EXAM:  Vitals: BP  134/86   Pulse 82   Temp 99.1  F (37.3  C)   SpO2 97%     General: Patient is well-nourished, well-groomed, in no apparent distress.  She is here alone.  She brings a walker.     HEENT: Head is atraumatic, eyes look normal exteriorly, throat clear, neck supple.  Ext: Warm, well-perfused.  She has pitting edema in her ankles.  There is an indentation around her socks.  There is also some swelling in her left wrist and arms.  There are some prominent veins in the left wrist.    Neurologic:  Mental Status: Alert and oriented to person, place, time, and situation.  Able to provide and excellent history.     Cranial Nerves: Visual fields full to confrontation.  Pupils equal and reactive to light.  Extraocular movements full.  Face sensation normal.  Normal head impulse testing.  Normal hearing to finger rub. Face symmetric with normal movements. Tongue and palate midline.  Normal shoulder elevation.      Motor: Normal bulk and tone.  No pronator drift.  Normal foot taps.  Full strength to confrontational testing.    Sensory: Normal light touch, temperature, and vibration in the arms.  There is decreased vibratory sensation to the distal part of the lower leg.    Reflexes: Biceps, Brachioradialis, Triceps are 1/2, Knees and Ankles are trace.     Coordination: Normal finger to nose, rapid alternating movements    Gait: Can take a few steps without the walker only. Her initiation is good.  She can get up from sitting without difficulty.        Focused Vestibular:  Head Impulse testing:  No catch-up saccades  Positional Testing: Patient was placed in the supine, right ear down, left ear down, right head-hanging, center-hanging, and left head-hanging positions.  There was no nystagmus or vertigo elicited.  She did feel lightheaded on getting back up.       DATA:  All available and relevant labs, imaging, and other procedures were reviewed personally.   Last brain imaging:  MRI BRAIN WITHOUT AND WITH CONTRAST  7/17/2020  3:28 PM     HISTORY: CNS metastatic lesions suspected, initial workup. 69-year-old  female with previously noted lower medullary lesion on 2017 who has  progressive imbalance and dizziness. Imbalance. Dizziness. Brainstem  lesion.     TECHNIQUE:  Multiplanar, multisequence MRI of the brain without and  with 8 mL Gadavist     COMPARISON: 4/21/2017     FINDINGS: Diffusion-weighted images are normal. There is no evidence  for intracranial hemorrhage or acute infarct. There is a minimal  presumed calcification along the left tentorial leaf. This is not  associated with any mass effect and appears benign. This was present  on the prior exam and is unchanged. There is also a small amount of  hemosiderin in the medulla with some minimal enhancement but no T2  signal abnormality. This is probably due to a capillary  telangiectasia. This is also unchanged from the prior exam. There is  some mild cerebral atrophy. Minimal nonspecific white matter changes  without mass effect or enhancement are present. Postcontrast images do  not show any other abnormal areas of enhancement or any focal mass  lesions.                                                               IMPRESSION:  1. No evidence for metastatic disease.  2. 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  3. Scattered nonspecific white matter changes which are nonspecific  but probably due to chronic small vessel ischemic disease.     EUSEBIO BRANDT MD    Last audiogram:    Last ENG/VNG:    Recent Labs   Lab Test 05/12/20  1118   WBC 7.8   RBC 4.28   HGB 12.3   HCT 39.2   MCV 92   MCH 28.7   MCHC 31.4*   RDW 14.1          Recent Labs   Lab Test 05/12/20  1118 11/11/19  1510     --    POTASSIUM 3.7  --    CHLORIDE 106  --    RAMBO 8.9  --    CO2 28  --    BUN 16  --    CR 0.59  --    *  --    TSH  --  1.91       25-minutes spent in evaluation, examination, and counseling with over 50% spent on counseling.    Answers for  HPI/ROS submitted by the patient on 7/27/2020   General Symptoms: No  Skin Symptoms: No  HENT Symptoms: Yes  EYE SYMPTOMS: Yes  HEART SYMPTOMS: Yes  LUNG SYMPTOMS: Yes  INTESTINAL SYMPTOMS: Yes  URINARY SYMPTOMS: Yes  GYNECOLOGIC SYMPTOMS: Yes  BREAST SYMPTOMS: No  SKELETAL SYMPTOMS: Yes  BLOOD SYMPTOMS: Yes  NERVOUS SYSTEM SYMPTOMS: Yes  MENTAL HEALTH SYMPTOMS: No  Ear pain: No  Ear discharge: No  Hearing loss: Yes  Tinnitus: Yes  Nosebleeds: No  Congestion: Yes  Sinus pain: No  Trouble swallowing: Yes   Voice hoarseness: No  Mouth sores: Yes  Sore throat: No  Tooth pain: No  Gum tenderness: Yes  Bleeding gums: No  Change in taste: No  Change in sense of smell: No  Dry mouth: Yes  Hearing aid used: Yes  Neck lump: No  Eye pain: No  Vision loss: No  Dry eyes: Yes  Watery eyes: No  Eye bulging: No  Double vision: No  Flashing of lights: No  Spots: No  Floaters: Yes  Redness: No  Crossed eyes: No  Tunnel Vision: No  Yellowing of eyes: No  Eye irritation: Yes  Cough: No  Sputum or phlegm: No  Coughing up blood: No  Difficulty breating or shortness of breath: No  Snoring: Yes  Wheezing: No  Difficulty breathing on exertion: No  Nighttime Cough: No  Difficulty breathing when lying flat: No  Chest pain or pressure: No  Fast or irregular heartbeat: Yes  Pain in legs with walking: Yes  Trouble breathing while lying down: No  Fingers or toes appear blue: No  High blood pressure: Yes  Low blood pressure: No  Fainting: No  Murmurs: No  Pacemaker: No  Varicose veins: No  Edema or swelling: Yes  Wake up at night with shortness of breath: No  Light-headedness: Yes  Exercise intolerance: Yes  Heart burn or indigestion: No  Nausea: Yes  Vomiting: Yes  Abdominal pain: No  Bloating: Yes  Constipation: No  Diarrhea: No  Blood in stool: No  Black stools: No  Rectal or Anal pain: No  Fecal incontinence: No  Yellowing of skin or eyes: No  Vomit with blood: No  Change in stools: No  Trouble holding urine or incontinence: Yes  Pain or  burning: No  Trouble starting or stopping: Yes  Increased frequency of urination: No  Blood in urine: No  Decreased frequency of urination: No  Frequent nighttime urination: No  Flank pain: No  Difficulty emptying bladder: Yes  Back pain: Yes  Muscle aches: Yes  Neck pain: Yes  Swollen joints: No  Joint pain: Yes  Bone pain: Yes  Muscle cramps: Yes  Muscle weakness: No  Joint stiffness: Yes  Bone fracture: Yes  Anemia: No  Swollen glands: No  Easy bleeding or bruising: No  Trouble with coordination: Yes  Dizziness or trouble with balance: Yes  Fainting or black-out spells: No  Memory loss: No  Headache: No  Seizures: No  Speech problems: No  Tingling: Yes  Tremor: No  Weakness: No  Difficulty walking: Yes  Paralysis: No  Numbness: Yes  Bleeding or spotting between periods: No  Heavy or painful periods: No  Irregular periods: No  Vaginal discharge: No  Hot flashes: No  Vaginal dryness: Yes  Genital ulcers: No  Reduced libido: Yes  Painful intercourse: No  Difficulty with sexual arousal: Yes  Post-menopausal bleeding: No      Again, thank you for allowing me to participate in the care of your patient.      Sincerely,    Clau HANSON Cha, MD

## 2020-07-29 NOTE — NURSING NOTE
Chief Complaint   Patient presents with     RECHECK     [facility] RETURN - 3 MO F/U      Roya Boucher, EMT

## 2020-07-30 ENCOUNTER — OFFICE VISIT (OUTPATIENT)
Dept: ORTHOPEDICS | Facility: CLINIC | Age: 70
End: 2020-07-30
Payer: COMMERCIAL

## 2020-07-30 VITALS
HEART RATE: 82 BPM | DIASTOLIC BLOOD PRESSURE: 85 MMHG | SYSTOLIC BLOOD PRESSURE: 134 MMHG | BODY MASS INDEX: 27.94 KG/M2 | WEIGHT: 178 LBS | HEIGHT: 67 IN

## 2020-07-30 DIAGNOSIS — M54.41 CHRONIC RIGHT-SIDED LOW BACK PAIN WITH RIGHT-SIDED SCIATICA: Primary | ICD-10-CM

## 2020-07-30 DIAGNOSIS — G89.29 CHRONIC RIGHT-SIDED LOW BACK PAIN WITH RIGHT-SIDED SCIATICA: Primary | ICD-10-CM

## 2020-07-30 DIAGNOSIS — Z11.59 ENCOUNTER FOR SCREENING FOR OTHER VIRAL DISEASES: Primary | ICD-10-CM

## 2020-07-30 ASSESSMENT — MIFFLIN-ST. JEOR: SCORE: 1370.03

## 2020-07-30 NOTE — PROGRESS NOTES
SPORTS & ORTHOPEDIC WALK-IN VISIT 7/30/2020    Primary Care Physician: Dr. Quiñonez    Here today with right sided low back pain and right lower leg pain and paresthesia. Current pain in right lumbar area. Numbness in lateral right lower leg.   Previously seen by Dr. Ramirez in January surgery was discussed but she has been trying hard to avoid. Was performing pilattes classes which helped keep her back from getting much worse.has also been doing PT and HEP regularly.   Due to COVID, she was more sedentary and noticed some recurring pain in her low back. Has not noted any weakness. Prolonged sitting and walking is painful.     Reason for visit:     What part of your body is injured / painful?  right low back    What caused the injury /pain? No inciting event     How long ago did your injury occur or pain begin? several weeks ago    What are your most bothersome symptoms? Pain, Numbness and Tingling    How would you characterize your symptom?  aching and dull    What makes your symptoms better? Rest and Other: exercise    What makes your symptoms worse? Prolonged sitting, standing, walking    Have you been previously seen for this problem? Yes, Dr. Ramirez    Medical History:    Any recent changes to your medical history? No    Any new medication prescribed since last visit? No    Have you had surgery on this body part before? 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 No    Do you have any vision problems? No    Do you have any chest pain or edema? No    Do you have any shortness of breath or wheezing?  No    Do you have stomach problems? Yes, nausea correlated to balance issues    Do you have any numbness or focal weakness? Yes, R LE    Do you have diabetes? No    Do you have problems with bleeding or clotting? No    Do you have an rashes or other skin  "lesions? No         Past Medical History, Current Medications, and Allergies are reviewed in the electronic medical record as appropriate.       EXAM:/85   Pulse 82   Ht 1.702 m (5' 7\")   Wt 80.7 kg (178 lb)   BMI 27.88 kg/m      General: alert, pleasant, no distress. sitting comfortably in chair  Back/Spine: no tenderness to palpation of spinous processes, or paraspinous musculature of lumbar spine. Very limited ROM, forward flexion is particularly painful. Slump positive on right. No significant hamstring tightness noted. no pain in back with CINDY testing bilaterally  Neuro: SILT on bilateral lower extremities, weakness in dorsiflexion on right compared to contralateral side. can stand on toes without difficulty, patellar and achilles reflexes 2+ and symmetric,    Imaging: reviewed previous MRI of lumbar spine dated 12/20/2019. Per Radiology: Impression:  1. Multilevel periarticular enhancement of the lumbar facet joints,  left greater than right as detailed above. Findings suggest  inflammatory facet arthropathy.  2. Multilevel lumbar spondylosis as described in the findings.  3. T11 anterior compression deformity, likely chronic.      Assessment: Patient is a 70 year old female with right sided low back pain and right lower extremity symptoms consistent with an L4 radiculopathy.    Recommendations:   Reviewed imaging and assessment the patient in detail  After long discussion it seems that her current symptoms seem more radicular than facet related, as they were when she saw Dr. Ramirez in January.  For this reason we will pursue transforaminal epidural steroid injection at L4 on the right.    Bob Arredondo MD                "

## 2020-07-30 NOTE — LETTER
7/30/2020         RE: Nadira Perez  [address]  [facility] [address]        Dear Colleague,    Thank you for referring your patient, Nadira Perez, to the [facility] SPORTS AND ORTHOPAEDIC WALK IN CLINIC. Please see a copy of my visit note below.          SPORTS & ORTHOPEDIC WALK-IN VISIT 7/30/2020    Primary Care Physician: Dr. Quiñonez    Here today with right sided low back pain and right lower leg pain and paresthesia. Current pain in right lumbar area. Numbness in lateral right lower leg.   Previously seen by Dr. Ramirez in January surgery was discussed but she has been trying hard to avoid. Was performing pilattes classes which helped keep her back from getting much worse.has also been doing PT and HEP regularly.   Due to COVID, she was more sedentary and noticed some recurring pain in her low back. Has not noted any weakness. Prolonged sitting and walking is painful.     Reason for visit:     What part of your body is injured / painful?  right low back    What caused the injury /pain? No inciting event     How long ago did your injury occur or pain begin? several weeks ago    What are your most bothersome symptoms? Pain, Numbness and Tingling    How would you characterize your symptom?  aching and dull    What makes your symptoms better? Rest and Other: exercise    What makes your symptoms worse? Prolonged sitting, standing, walking    Have you been previously seen for this problem? Yes, Dr. Ramirez    Medical History:    Any recent changes to your medical history? No    Any new medication prescribed since last visit? No    Have you had surgery on this body part before? 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 No    Do you have any vision problems? No    Do you have any chest pain or edema? No    Do you have any shortness of breath  "or wheezing?  No    Do you have stomach problems? Yes, nausea correlated to balance issues    Do you have any numbness or focal weakness? Yes, R LE    Do you have diabetes? No    Do you have problems with bleeding or clotting? No    Do you have an rashes or other skin lesions? No         Past Medical History, Current Medications, and Allergies are reviewed in the electronic medical record as appropriate.       EXAM:/85   Pulse 82   Ht 1.702 m (5' 7\")   Wt 80.7 kg (178 lb)   BMI 27.88 kg/m      General: alert, pleasant, no distress. sitting comfortably in chair  Back/Spine: no tenderness to palpation of spinous processes, or paraspinous musculature of lumbar spine. Very limited ROM, forward flexion is particularly painful. Slump positive on right. No significant hamstring tightness noted. no pain in back with CINDY testing bilaterally  Neuro: SILT on bilateral lower extremities, weakness in dorsiflexion on right compared to contralateral side. can stand on toes without difficulty, patellar and achilles reflexes 2+ and symmetric,    Imaging: reviewed previous MRI of lumbar spine dated 12/20/2019. Per Radiology: Impression:  1. Multilevel periarticular enhancement of the lumbar facet joints,  left greater than right as detailed above. Findings suggest  inflammatory facet arthropathy.  2. Multilevel lumbar spondylosis as described in the findings.  3. T11 anterior compression deformity, likely chronic.      Assessment: Patient is a 70 year old female with right sided low back pain and right lower extremity symptoms consistent with an L4 radiculopathy.    Recommendations:   Reviewed imaging and assessment the patient in detail  After long discussion it seems that her current symptoms seem more radicular than facet related, as they were when she saw Dr. Ramirez in January.  For this reason we will pursue transforaminal epidural steroid injection at L4 on the right.    Bob Arredondo MD                    "

## 2020-07-31 RX ORDER — ONDANSETRON 4 MG/1
4 TABLET, ORALLY DISINTEGRATING ORAL EVERY 8 HOURS PRN
Qty: 20 TABLET | Refills: 3 | Status: SHIPPED | OUTPATIENT
Start: 2020-07-31 | End: 2021-01-15

## 2020-08-05 RX ORDER — DEXTROSE MONOHYDRATE 25 G/50ML
25-50 INJECTION, SOLUTION INTRAVENOUS
Status: CANCELLED | OUTPATIENT
Start: 2020-08-05

## 2020-08-05 RX ORDER — NICOTINE POLACRILEX 4 MG
15-30 LOZENGE BUCCAL
Status: CANCELLED | OUTPATIENT
Start: 2020-08-05

## 2020-08-06 DIAGNOSIS — Z11.59 ENCOUNTER FOR SCREENING FOR OTHER VIRAL DISEASES: ICD-10-CM

## 2020-08-06 PROCEDURE — U0003 INFECTIOUS AGENT DETECTION BY NUCLEIC ACID (DNA OR RNA); SEVERE ACUTE RESPIRATORY SYNDROME CORONAVIRUS 2 (SARS-COV-2) (CORONAVIRUS DISEASE [COVID-19]), AMPLIFIED PROBE TECHNIQUE, MAKING USE OF HIGH THROUGHPUT TECHNOLOGIES AS DESCRIBED BY CMS-2020-01-R: HCPCS | Performed by: FAMILY MEDICINE

## 2020-08-07 ENCOUNTER — TELEPHONE (OUTPATIENT)
Dept: MEDSURG UNIT | Facility: CLINIC | Age: 70
End: 2020-08-07

## 2020-08-07 LAB
SARS-COV-2 RNA SPEC QL NAA+PROBE: NOT DETECTED
SPECIMEN SOURCE: NORMAL

## 2020-08-07 NOTE — TELEPHONE ENCOUNTER
Pre-Procedure Negative COVID Test     Step 1 Patient Notification  Patient notified of the negative COVID test result    Step 2 Patient Information  Verified the patient remains symptom free   Patient informed to contact the ordering provider if any of the symptoms develop prior to the procedure    Fever/Chills   Cough   Shortness of breath   New loss of taste or smell  Sore throat  Muscle or body aches  Headaches  Fatigue  Vomiting or diarrhea    Step 3 Review Visitor Policy  Patient informed of the updated visitor policy   1 visitor allowed per patient   Visitor must screen negative for COVID symptoms   Visitor must wear a mask  Waiting rooms continue to be closed to visitors    Sari Gooden RN

## 2020-08-10 ENCOUNTER — HOSPITAL ENCOUNTER (OUTPATIENT)
Dept: GENERAL RADIOLOGY | Facility: CLINIC | Age: 70
End: 2020-08-10
Attending: FAMILY MEDICINE
Payer: COMMERCIAL

## 2020-08-10 ENCOUNTER — HOSPITAL ENCOUNTER (OUTPATIENT)
Facility: CLINIC | Age: 70
Discharge: HOME | End: 2020-08-10
Admitting: PHYSICIAN ASSISTANT
Payer: COMMERCIAL

## 2020-08-10 VITALS
RESPIRATION RATE: 16 BRPM | HEART RATE: 72 BPM | OXYGEN SATURATION: 98 % | SYSTOLIC BLOOD PRESSURE: 144 MMHG | BODY MASS INDEX: 27.94 KG/M2 | TEMPERATURE: 98.8 F | HEIGHT: 67 IN | WEIGHT: 178 LBS | DIASTOLIC BLOOD PRESSURE: 85 MMHG

## 2020-08-10 DIAGNOSIS — M54.41 CHRONIC RIGHT-SIDED LOW BACK PAIN WITH RIGHT-SIDED SCIATICA: ICD-10-CM

## 2020-08-10 DIAGNOSIS — G89.29 CHRONIC RIGHT-SIDED LOW BACK PAIN WITH RIGHT-SIDED SCIATICA: ICD-10-CM

## 2020-08-10 PROCEDURE — 25000128 H RX IP 250 OP 636: Performed by: PHYSICIAN ASSISTANT

## 2020-08-10 PROCEDURE — 25500064 ZZH RX 255 OP 636: Performed by: PHYSICIAN ASSISTANT

## 2020-08-10 PROCEDURE — 25000125 ZZHC RX 250: Performed by: PHYSICIAN ASSISTANT

## 2020-08-10 PROCEDURE — 64483 NJX AA&/STRD TFRM EPI L/S 1: CPT

## 2020-08-10 PROCEDURE — 40000863 ZZH STATISTIC RADIOLOGY XRAY, US, CT, MAR, NM

## 2020-08-10 RX ORDER — DEXAMETHASONE SODIUM PHOSPHATE 10 MG/ML
20 INJECTION, SOLUTION INTRAMUSCULAR; INTRAVENOUS ONCE
Status: COMPLETED | OUTPATIENT
Start: 2020-08-10 | End: 2020-08-10

## 2020-08-10 RX ORDER — DEXTROSE MONOHYDRATE 25 G/50ML
25-50 INJECTION, SOLUTION INTRAVENOUS
Status: DISCONTINUED | OUTPATIENT
Start: 2020-08-10 | End: 2020-08-10 | Stop reason: HOSPADM

## 2020-08-10 RX ORDER — NICOTINE POLACRILEX 4 MG
15-30 LOZENGE BUCCAL
Status: DISCONTINUED | OUTPATIENT
Start: 2020-08-10 | End: 2020-08-10 | Stop reason: HOSPADM

## 2020-08-10 RX ORDER — IOPAMIDOL 408 MG/ML
10 INJECTION, SOLUTION INTRATHECAL ONCE
Status: COMPLETED | OUTPATIENT
Start: 2020-08-10 | End: 2020-08-10

## 2020-08-10 RX ORDER — LIDOCAINE HYDROCHLORIDE 10 MG/ML
5 INJECTION, SOLUTION EPIDURAL; INFILTRATION; INTRACAUDAL; PERINEURAL ONCE
Status: COMPLETED | OUTPATIENT
Start: 2020-08-10 | End: 2020-08-10

## 2020-08-10 RX ORDER — LIDOCAINE HYDROCHLORIDE 10 MG/ML
30 INJECTION, SOLUTION EPIDURAL; INFILTRATION; INTRACAUDAL; PERINEURAL ONCE
Status: COMPLETED | OUTPATIENT
Start: 2020-08-10 | End: 2020-08-10

## 2020-08-10 RX ADMIN — IOPAMIDOL 1 ML: 408 INJECTION, SOLUTION INTRATHECAL at 14:26

## 2020-08-10 RX ADMIN — LIDOCAINE HYDROCHLORIDE 3 ML: 10 INJECTION, SOLUTION EPIDURAL; INFILTRATION; INTRACAUDAL; PERINEURAL at 14:25

## 2020-08-10 RX ADMIN — LIDOCAINE HYDROCHLORIDE 3 ML: 10 INJECTION, SOLUTION EPIDURAL; INFILTRATION; INTRACAUDAL; PERINEURAL at 14:29

## 2020-08-10 RX ADMIN — DEXAMETHASONE SODIUM PHOSPHATE 20 MG: 10 INJECTION, SOLUTION INTRAMUSCULAR; INTRAVENOUS at 14:28

## 2020-08-10 ASSESSMENT — MIFFLIN-ST. JEOR: SCORE: 1370.03

## 2020-08-10 NOTE — PROGRESS NOTES
Care Suites Admission Nursing Note    Patient Information  Name: Nadira Perez  Age: 70 year old  Reason for admission: epidural  Care Suites arrival time: 1330    Patient Admission/Assessment   Pre-procedure assessment complete: Yes  If abnormal assessment/labs, provider notified: N/A  NPO: Yes  Medications held per instructions/orders: N/A  Consent: obtained  If applicable, pregnancy test status: declined  Patient oriented to room: Yes  Education/questions answered: Yes  Plan/other: proceed    Discharge Planning  Accompanied by: self  Discharge name/phone number: Myles funes in law - [phone]    Discharge location: home    Live Villasenor RN

## 2020-08-10 NOTE — DISCHARGE SUMMARY
Care Suites Discharge Nursing Note    Patient Information  Name: Nadira Perez  Age: 70 year old    Discharge Education:  Discharge instructions reviewed: Yes  Additional education/resources provided: no  Patient/patient representative verbalizes understanding: no  Patient discharging on new medications: Yes  Medication education completed: N/A    Discharge Plans:   Discharge location: home  Discharge ride contacted: Yes  Approximate discharge time: 1515    Discharge Criteria:  Discharge criteria met and vital signs stable: Yes    Patient Belongs:  Patient belongings returned to patient: Yes    Live Villasenor RN       After 30 minutes post injection, injection site is CDI, no hematoma or swelling.  Patient states pain isrelieved.  Tolerating regular diet and fluids.  Denies numbness or tingling in hands and feet.  Patient discharged to home with . Discharge instructions reviewed with patient and patient verbalizes understanding.

## 2020-08-10 NOTE — DISCHARGE INSTRUCTIONS
Steroid Injection Discharge Instructions     After you go home:      You may resume your normal diet.    Care of Puncture Site:      If you have a bandaid on your puncture site, you may remove it the next morning    You may shower tomorrow    No bath tubs, whirlpools or swimming for at least 3 days     Activity:      You may go back to normal activity in 24 hours    You should let pain be your guide as to the extent of your activities    Maintain any activity limitations as ordered by your provider    Do NOT drive a vehicle if you develop numbness in your arm or leg    Medicines:      You may resume all medications    For minor pain, you may take Acetaminophen (Tylenol) or Ibuprofen (Advil)    Pain:       You may experience increased or different pain over the next 24-48 hours    For the next 48 hrs - you may use ice packs for discomfort     Call your primary care doctor if:      You have severe pain that does not improve with pain medication    You have chills or a fever greater than 101 F (38 C)    The site is red, swollen, hot or tender    New problems with your bowel or bladder    Any questions or concerns    Other Instructions:      New numbness down your leg post injection is temporary and may last for up to 6 hours. You may need assistance with activity until your leg has normal sensation.    If you are diabetic, monitor your blood sugar closely. Contact the provider who manages your diabetes to help you control your blood sugar if needed.    For Your Information:      A steroid was injected to help decrease swelling and may help to reduce pain. It may take up to 7-10 days to obtain full results.    Some patients will get lasting relief from a single injection. Others may require up to 3 injections to get results. If you have more than one steroid injection, they should be given 2 weeks apart.    Side effects of your steroid injection are mild and will go away in 2-3 days  - Insomnia  - Heartburn  - Flushed  face  - Water retention  - Increased appetite  - Increased blood sugar      If you have questions call:        [facility] @ [phone]

## 2020-08-14 ENCOUNTER — HOSPITAL ENCOUNTER (OUTPATIENT)
Facility: CLINIC | Age: 70
Setting detail: SPECIMEN
Discharge: HOME | End: 2020-08-14
Admitting: PSYCHIATRY & NEUROLOGY
Payer: COMMERCIAL

## 2020-08-14 ENCOUNTER — TELEPHONE (OUTPATIENT)
Dept: AUDIOLOGY | Facility: CLINIC | Age: 70
End: 2020-08-14

## 2020-08-14 ENCOUNTER — CARE COORDINATION (OUTPATIENT)
Dept: NEUROLOGY | Facility: CLINIC | Age: 70
End: 2020-08-14

## 2020-08-14 ENCOUNTER — ANCILLARY PROCEDURE (OUTPATIENT)
Dept: ULTRASOUND IMAGING | Facility: CLINIC | Age: 70
End: 2020-08-14
Attending: PSYCHIATRY & NEUROLOGY
Payer: COMMERCIAL

## 2020-08-14 DIAGNOSIS — G62.9 PERIPHERAL POLYNEUROPATHY: ICD-10-CM

## 2020-08-14 DIAGNOSIS — R42 DIZZINESS: ICD-10-CM

## 2020-08-14 LAB
FOLATE SERPL-MCNC: 67.4 NG/ML
VIT B12 SERPL-MCNC: 735 PG/ML (ref 193–986)

## 2020-08-14 PROCEDURE — 82746 ASSAY OF FOLIC ACID SERUM: CPT | Performed by: PSYCHIATRY & NEUROLOGY

## 2020-08-14 PROCEDURE — 86780 TREPONEMA PALLIDUM: CPT | Performed by: PSYCHIATRY & NEUROLOGY

## 2020-08-14 NOTE — PROGRESS NOTES
"I spoke with radiology regarding pt ultrasound bilateral carotid today. They were unable to reach Dr. Antoine and wanted to review what was needed. Per Dr. Antoine note she wants to \"check neck ultrasound (carotid and jugular) for the positional symptoms\". They wanted to clarify movements were needed with the carotid and jugular test. I recommended they do the movement with testing to ensure we have all needed data.     Kacey DEE  "

## 2020-08-14 NOTE — TELEPHONE ENCOUNTER
Nadira Perez was seen at the [facility] on 8/14/20 for hearing aid services.  She reported her left hearing aid didn't seem to be working well.  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  Filters and domes were replaced bilaterally.  Afterwards both hearing aids were found to be working normally.  Nadira reported good sound quality and will return to the clinic as needed.

## 2020-08-15 LAB — T PALLIDUM AB SER QL: NONREACTIVE

## 2020-08-18 ENCOUNTER — TELEPHONE (OUTPATIENT)
Dept: ORTHOPEDICS | Facility: CLINIC | Age: 70
End: 2020-08-18

## 2020-08-18 NOTE — TELEPHONE ENCOUNTER
The patient was calling to follow up after her GREGOR that she had last Monday. She said that she is still having a significant amount of pain and she wanted to see if there was anything that she should do. She was requesting possibly getting another GREGOR but she was told that typically they wait 2 weeks after the first injection to see how she is feeling after. I told her that Dr. Arredondo wasn't in the office today but I would talk with him tomorrow to see if there is anything she can do in the meantime to help with her pain. The patient understood this and had no further questions.     CAMILA El

## 2020-08-19 ENCOUNTER — TELEPHONE (OUTPATIENT)
Dept: ORTHOPEDICS | Facility: CLINIC | Age: 70
End: 2020-08-19

## 2020-08-19 DIAGNOSIS — G89.29 CHRONIC RIGHT-SIDED LOW BACK PAIN WITH RIGHT-SIDED SCIATICA: Primary | ICD-10-CM

## 2020-08-19 DIAGNOSIS — M54.41 CHRONIC RIGHT-SIDED LOW BACK PAIN WITH RIGHT-SIDED SCIATICA: Primary | ICD-10-CM

## 2020-08-19 RX ORDER — TIZANIDINE 2 MG/1
2-4 TABLET ORAL 3 TIMES DAILY PRN
Qty: 30 TABLET | Refills: 1 | Status: SHIPPED | OUTPATIENT
Start: 2020-08-19 | End: 2021-08-09

## 2020-08-19 RX ORDER — PREDNISONE 20 MG/1
40 TABLET ORAL DAILY
Qty: 10 TABLET | Refills: 0 | Status: SHIPPED | OUTPATIENT
Start: 2020-08-19 | End: 2021-01-15

## 2020-08-24 ENCOUNTER — THERAPY VISIT (OUTPATIENT)
Dept: PHYSICAL THERAPY | Facility: CLINIC | Age: 70
End: 2020-08-24
Payer: COMMERCIAL

## 2020-08-24 DIAGNOSIS — M54.16 LUMBAR RADICULOPATHY: ICD-10-CM

## 2020-08-24 PROCEDURE — 97530 THERAPEUTIC ACTIVITIES: CPT | Mod: GP | Performed by: PHYSICAL THERAPY ASSISTANT

## 2020-09-02 DIAGNOSIS — E78.00 PURE HYPERCHOLESTEROLEMIA: ICD-10-CM

## 2020-09-03 ENCOUNTER — OFFICE VISIT (OUTPATIENT)
Dept: ORTHOPEDICS | Facility: CLINIC | Age: 70
End: 2020-09-03
Payer: COMMERCIAL

## 2020-09-03 VITALS — BODY MASS INDEX: 27.94 KG/M2 | HEIGHT: 67 IN | WEIGHT: 178 LBS

## 2020-09-03 DIAGNOSIS — M47.817 SPONDYLOSIS WITHOUT MYELOPATHY OR RADICULOPATHY, LUMBOSACRAL REGION: ICD-10-CM

## 2020-09-03 DIAGNOSIS — Z11.59 ENCOUNTER FOR SCREENING FOR OTHER VIRAL DISEASES: Primary | ICD-10-CM

## 2020-09-03 DIAGNOSIS — M54.41 CHRONIC BILATERAL LOW BACK PAIN WITH BILATERAL SCIATICA: Primary | ICD-10-CM

## 2020-09-03 DIAGNOSIS — G89.29 CHRONIC BILATERAL LOW BACK PAIN WITH BILATERAL SCIATICA: Primary | ICD-10-CM

## 2020-09-03 DIAGNOSIS — M54.42 CHRONIC BILATERAL LOW BACK PAIN WITH BILATERAL SCIATICA: Primary | ICD-10-CM

## 2020-09-03 DIAGNOSIS — S22.080A CLOSED WEDGE COMPRESSION FRACTURE OF ELEVENTH THORACIC VERTEBRA, INITIAL ENCOUNTER: ICD-10-CM

## 2020-09-03 RX ORDER — HYDROCODONE BITARTRATE AND ACETAMINOPHEN 5; 325 MG/1; MG/1
1 TABLET ORAL EVERY 6 HOURS PRN
Qty: 40 TABLET | Refills: 0 | Status: SHIPPED | OUTPATIENT
Start: 2020-09-03 | End: 2021-01-05

## 2020-09-03 ASSESSMENT — MIFFLIN-ST. JEOR: SCORE: 1370.03

## 2020-09-03 NOTE — Clinical Note
9/3/2020         RE: Nadira Perez  [address]  [facility] [address]        Dear Colleague,    Thank you for referring your patient, Nadira Perez, to the [facility] SPORTS AND ORTHOPAEDIC WALK IN CLINIC. Please see a copy of my visit note below.          SPORTS & ORTHOPEDIC WALK-IN FOLLOW-UP VISIT 9/3/2020    Interval History:     Follow up reason: Low back pain    Date of injury: Chronic    Date last seen: 7/30/2020    Following Therapeutic Plan: Yes     Pain: Unchanged    Function: Worsening    Interval History: She had the GREGOR on 8/10/20 and got minimal relief from it. She also did the prednisone which hasn't helped much. She is interested in another GREGOR possibly. She is having worse radicular pain along the right leg. She has also noticed an increase in left sided low back pain with radicular symptoms. She is curious about going to a chiropractor and acupuncture. She has tried patches which haven't seemed to help.     Medical History:    Any recent changes to your medical history? No    Any new medication prescribed since last visit? No    Review of Systems:    Do you have fever, chills, weight loss? No    Do you have any vision problems? No    Do you have any chest pain or edema? No    Do you have any shortness of breath or wheezing?  No    Do you have stomach problems? Yes, nausea due to balance issues    Do you have any numbness or focal weakness? Yes, right leg    Do you have diabetes? No    Do you have problems with bleeding or clotting? No    Do you have an rashes or other skin lesions? No             Again, thank you for allowing me to participate in the care of your patient.        Sincerely,        Bob Arredondo MD

## 2020-09-03 NOTE — PROGRESS NOTES
"      SPORTS & ORTHOPEDIC WALK-IN FOLLOW-UP VISIT 9/3/2020    Interval History:     Follow up reason: Low back pain    Date of injury: Chronic    Date last seen: 7/30/2020    Following Therapeutic Plan: Yes     Pain: Unchanged    Function: Worsening    Interval History: Here today for GREGOR follow up. She had the GREGOR on 8/10/20 and got minimal relief from it. Maybe slight relief for a couple of days, but then pain returned as bad or worse than previous. She also did the prednisone which hasn't helped much. She is interested in another GREGOR possibly. She is having worse radicular pain along the right leg. She has also noticed an increase in left sided low back pain with radicular symptoms. She is curious about going to a chiropractor and acupuncture. She has tried patches which haven't seemed to help. However her worst pain is in the low back.     Medical History:    Any recent changes to your medical history? No    Any new medication prescribed since last visit? No    Review of Systems:    Do you have fever, chills, weight loss? No    Do you have any vision problems? No    Do you have any chest pain or edema? No    Do you have any shortness of breath or wheezing?  No    Do you have stomach problems? Yes, nausea due to balance issues    Do you have any numbness or focal weakness? Yes, right leg    Do you have diabetes? No    Do you have problems with bleeding or clotting? No    Do you have an rashes or other skin lesions? No         Past Medical History, Current Medications, and Allergies are reviewed in the electronic medical record as appropriate.       EXAM:Ht 1.702 m (5' 7\")   Wt 80.7 kg (178 lb)   BMI 27.88 kg/m      General: alert, pleasant, no distress. sitting comfortably in chair  Back/Spine: no tenderness to palpation of spinous processes, or paraspinous musculature of lumbar spine. Pain with extension.  Straight leg raise negative bilaterally.   Neuro: SILT on bilateral lower extremities, can stand on toes " and heel walk without difficulty,       Imaging: no new imaging      Assessment: Patient is a 70 year old female with bilateral low back pain with some subacute radicular symptoms. No significant improvement after Right L4/5 GREGOR. Currently worst pain in low back.     Recommendations:   Reviewed imaging and assessment with the patient in detail  Discussed options for treatment. She would like to consider GREGOR. Was previously recommended to have facet injection in January 2020 but more recently developed radicular symptoms, however she does seem to be having some facet pain.   Plan to pursue bilateral facet injections. Follow up 2 weeks post injection.     Bob Arredondo MD

## 2020-09-04 RX ORDER — DEXTROSE MONOHYDRATE 25 G/50ML
25-50 INJECTION, SOLUTION INTRAVENOUS
Status: CANCELLED | OUTPATIENT
Start: 2020-09-04

## 2020-09-04 RX ORDER — NICOTINE POLACRILEX 4 MG
15-30 LOZENGE BUCCAL
Status: CANCELLED | OUTPATIENT
Start: 2020-09-04

## 2020-09-05 RX ORDER — ATORVASTATIN CALCIUM 40 MG/1
40 TABLET, FILM COATED ORAL DAILY
Qty: 90 TABLET | Refills: 2 | Status: SHIPPED | OUTPATIENT
Start: 2020-09-05 | End: 2021-02-23

## 2020-09-08 DIAGNOSIS — Z11.59 ENCOUNTER FOR SCREENING FOR OTHER VIRAL DISEASES: ICD-10-CM

## 2020-09-08 LAB
SARS-COV-2 RNA SPEC QL NAA+PROBE: NOT DETECTED
SPECIMEN SOURCE: NORMAL

## 2020-09-08 PROCEDURE — U0003 INFECTIOUS AGENT DETECTION BY NUCLEIC ACID (DNA OR RNA); SEVERE ACUTE RESPIRATORY SYNDROME CORONAVIRUS 2 (SARS-COV-2) (CORONAVIRUS DISEASE [COVID-19]), AMPLIFIED PROBE TECHNIQUE, MAKING USE OF HIGH THROUGHPUT TECHNOLOGIES AS DESCRIBED BY CMS-2020-01-R: HCPCS | Performed by: FAMILY MEDICINE

## 2020-09-10 ENCOUNTER — TELEPHONE (OUTPATIENT)
Dept: MEDSURG UNIT | Facility: CLINIC | Age: 70
End: 2020-09-10

## 2020-09-10 NOTE — TELEPHONE ENCOUNTER
Pre-Procedure Negative COVID Test     Step 1 Patient Notification  Patient notified of the negative COVID test result    Step 2 Patient Information  Verified the patient remains symptom free   Patient informed to contact the ordering provider if any of the symptoms develop prior to the procedure    Fever/Chills   Cough   Shortness of breath   New loss of taste or smell  Sore throat  Muscle or body aches  Headaches  Fatigue  Vomiting or diarrhea    Step 3 Review Visitor Policy  Patient informed of the updated visitor policy     1 visitor allowed per patient    Visitor name: None   Visitor must screen negative for COVID symptoms   Visitor must wear a mask  Waiting rooms continue to be closed to visitors    Sandra Hallman RN

## 2020-09-11 ENCOUNTER — HOSPITAL ENCOUNTER (OUTPATIENT)
Dept: GENERAL RADIOLOGY | Facility: CLINIC | Age: 70
End: 2020-09-11
Attending: FAMILY MEDICINE
Payer: COMMERCIAL

## 2020-09-11 ENCOUNTER — HOSPITAL ENCOUNTER (OUTPATIENT)
Facility: CLINIC | Age: 70
Discharge: HOME | End: 2020-09-11
Admitting: PHYSICIAN ASSISTANT
Payer: COMMERCIAL

## 2020-09-11 ENCOUNTER — MYC MEDICAL ADVICE (OUTPATIENT)
Dept: INTERNAL MEDICINE | Facility: CLINIC | Age: 70
End: 2020-09-11

## 2020-09-11 VITALS
TEMPERATURE: 99.3 F | HEIGHT: 67 IN | RESPIRATION RATE: 16 BRPM | HEART RATE: 88 BPM | OXYGEN SATURATION: 97 % | BODY MASS INDEX: 27.62 KG/M2 | SYSTOLIC BLOOD PRESSURE: 128 MMHG | WEIGHT: 176 LBS | DIASTOLIC BLOOD PRESSURE: 68 MMHG

## 2020-09-11 DIAGNOSIS — S22.080A CLOSED WEDGE COMPRESSION FRACTURE OF ELEVENTH THORACIC VERTEBRA, INITIAL ENCOUNTER: ICD-10-CM

## 2020-09-11 DIAGNOSIS — M47.817 SPONDYLOSIS WITHOUT MYELOPATHY OR RADICULOPATHY, LUMBOSACRAL REGION: ICD-10-CM

## 2020-09-11 PROCEDURE — 40000863 ZZH STATISTIC RADIOLOGY XRAY, US, CT, MAR, NM

## 2020-09-11 PROCEDURE — 25000125 ZZHC RX 250: Performed by: FAMILY MEDICINE

## 2020-09-11 PROCEDURE — 25000125 ZZHC RX 250: Performed by: PHYSICIAN ASSISTANT

## 2020-09-11 PROCEDURE — 25500064 ZZH RX 255 OP 636: Performed by: PHYSICIAN ASSISTANT

## 2020-09-11 PROCEDURE — 25000128 H RX IP 250 OP 636: Performed by: PHYSICIAN ASSISTANT

## 2020-09-11 PROCEDURE — 64493 INJ PARAVERT F JNT L/S 1 LEV: CPT | Mod: 50

## 2020-09-11 RX ORDER — NICOTINE POLACRILEX 4 MG
15-30 LOZENGE BUCCAL
Status: DISCONTINUED | OUTPATIENT
Start: 2020-09-11 | End: 2020-09-11 | Stop reason: HOSPADM

## 2020-09-11 RX ORDER — DEXTROSE MONOHYDRATE 25 G/50ML
25-50 INJECTION, SOLUTION INTRAVENOUS
Status: DISCONTINUED | OUTPATIENT
Start: 2020-09-11 | End: 2020-09-11 | Stop reason: HOSPADM

## 2020-09-11 RX ORDER — LIDOCAINE HYDROCHLORIDE 10 MG/ML
30 INJECTION, SOLUTION EPIDURAL; INFILTRATION; INTRACAUDAL; PERINEURAL ONCE
Status: COMPLETED | OUTPATIENT
Start: 2020-09-11 | End: 2020-09-11

## 2020-09-11 RX ORDER — TRIAMCINOLONE ACETONIDE 40 MG/ML
80 INJECTION, SUSPENSION INTRA-ARTICULAR; INTRAMUSCULAR ONCE
Status: COMPLETED | OUTPATIENT
Start: 2020-09-11 | End: 2020-09-11

## 2020-09-11 RX ORDER — LIDOCAINE HYDROCHLORIDE 10 MG/ML
5 INJECTION, SOLUTION EPIDURAL; INFILTRATION; INTRACAUDAL; PERINEURAL ONCE
Status: COMPLETED | OUTPATIENT
Start: 2020-09-11 | End: 2020-09-11

## 2020-09-11 RX ORDER — IOPAMIDOL 408 MG/ML
10 INJECTION, SOLUTION INTRATHECAL ONCE
Status: COMPLETED | OUTPATIENT
Start: 2020-09-11 | End: 2020-09-11

## 2020-09-11 RX ADMIN — TRIAMCINOLONE ACETONIDE 80 MG: 40 INJECTION, SUSPENSION INTRA-ARTICULAR; INTRAMUSCULAR at 16:52

## 2020-09-11 RX ADMIN — LIDOCAINE HYDROCHLORIDE 5 ML: 10 INJECTION, SOLUTION EPIDURAL; INFILTRATION; INTRACAUDAL; PERINEURAL at 16:51

## 2020-09-11 RX ADMIN — LIDOCAINE HYDROCHLORIDE 2 ML: 10 INJECTION, SOLUTION EPIDURAL; INFILTRATION; INTRACAUDAL; PERINEURAL at 16:51

## 2020-09-11 RX ADMIN — IOPAMIDOL 1 ML: 408 INJECTION, SOLUTION INTRATHECAL at 16:52

## 2020-09-11 ASSESSMENT — MIFFLIN-ST. JEOR: SCORE: 1360.96

## 2020-09-11 NOTE — DISCHARGE INSTRUCTIONS
Steroid Injection Discharge Instructions     After you go home:      You may resume your normal diet.    Care of Puncture Site:      If you have a bandaid on your puncture site, you may remove it the next morning    You may shower tomorrow    No bath tubs, whirlpools or swimming for at least 3 days     Activity:      You may go back to normal activity in 24 hours    You should let pain be your guide as to the extent of your activities    Maintain any activity limitations as ordered by your provider    Do NOT drive a vehicle if you develop numbness in your arm or leg    Medicines:      You may resume all medications    Resume your Warfarin/Coumadin at your regular dose today. Follow up with your provider to have your INR rechecked    Resume your Platelet Inhibitors and Aspirin tomorrow at your regular dose    For minor pain, you may take Acetaminophen (Tylenol) or Ibuprofen (Advil)    Pain:       You may experience increased or different pain over the next 24-48 hours    For the next 48 hrs - you may use ice packs for discomfort     Call your primary care doctor if:      You have severe pain that does not improve with pain medication    You have chills or a fever greater than 101 F (38 C)    The site is red, swollen, hot or tender    New problems with your bowel or bladder    Any questions or concerns    Other Instructions:      New numbness down your leg post injection is temporary and may last for up to 6 hours. You may need assistance with activity until your leg has normal sensation.    If you are diabetic, monitor your blood sugar closely. Contact the provider who manages your diabetes to help you control your blood sugar if needed.    For Your Information:      A steroid was injected to help decrease swelling and may help to reduce pain. It may take up to 7-10 days to obtain full results.    Some patients will get lasting relief from a single injection. Others may require up to 3 injections to get results. If  you have more than one steroid injection, they should be given 2 weeks apart.    Side effects of your steroid injection are mild and will go away in 2-3 days  - Insomnia  - Heartburn  - Flushed face  - Water retention  - Increased appetite  - Increased blood sugar      If you have questions call:        [facility] @ [phone]

## 2020-09-11 NOTE — DISCHARGE SUMMARY
Care Suites Discharge Nursing Note    Patient Information  Name: Nadira Perez  Age: 70 year old    Discharge Education:  Discharge instructions reviewed: Yes  Additional education/resources provided: no  Patient/patient representative verbalizes understanding: Yes  Patient discharging on new medications: No  Medication education completed: N/A    Discharge Plans:   Discharge location: home  Discharge ride contacted: Yes  Approximate discharge time: 1730    Discharge Criteria:  Discharge criteria met and vital signs stable: Yes    Patient Belongs:  Patient belongings returned to patient: Yes    After 30 minutes post injection, injection site is CDI, no hematoma or swelling.  Patient states pain is unchanged.  Tolerating regular diet and fluids.  Denies numbness or tingling in hands and feet.  Patient discharged to home with . Discharge instructions reviewed with patient and patient verbalizes understanding.      Live Villasenor RN

## 2020-09-11 NOTE — PROGRESS NOTES
Care Suites Admission Nursing Note    Patient Information  Name: Nadira Perez  Age: 70 year old  Reason for admission: Lumbar/sacral epidural steroid injection.  Care Suites arrival time: 1425    Visitor Information  Name: BOB  Informed of visitor restrictions: N/A  1 visitor allowed per patient   Visitor must screen negative for COVID symptoms   Visitor must wear a mask  Waiting rooms closed to visitors    Patient Admission/Assessment   Pre-procedure assessment complete: Yes  If abnormal assessment/labs, provider notified: N/A  NPO: Yes  Medications held per instructions/orders: N/A  Consent: deferred  If applicable, pregnancy test status: deferred  Patient oriented to room: Yes  Education/questions answered: Yes  Plan/other: Proceed as scheduled.    Discharge Planning  Discharge name/phone number: Kadie [phone]  Overnight post sedation caregiver: BOB  Discharge location: home    Jocelyn Dewitt RN     [id number]  AVS/Discharge instructions given and reviewed with verbal understanding received.    1522  Hand of report given to Mckinley WILBURN RN

## 2020-09-11 NOTE — PROGRESS NOTES
Care Suites Post Procedure Note    Patient Information  Name: Nadira Perez  Age: 70 year old    Post Procedure  Time patient returned to Care Suites: 1630  Concerns/abnormal assessment: none  If abnormal assessment, provider notified: N/A  Plan/Other: monitor until discharge    Live Villasenor RN

## 2020-09-11 NOTE — PROGRESS NOTES
PATIENT/VISITOR WELLNESS SCREENING    Step 1 Patient Screening    1. In the last month, have you been in contact with someone who was confirmed or suspected to have Coronavirus/COVID-19? No    2. Do you have the following symptoms?  Fever/Chills? No   Cough? No   Shortness of breath? No   New loss of taste or smell? No  Sore throat? No  Muscle or body aches? No  Headaches? No  Fatigue? No  Vomiting or diarrhea? No      Step 3 Refer to logic grid below for actions    NO SYMPTOM(S)    ACTIONS:  1. Standard rooming process  2. Provider to assess per normal protocol  3. Implement precautions as needed and per guidelines     POSITIVE SYMPTOM(S)  If positive for ANY of the following symptoms: fever, cough, shortness of breath, rash    ACTION:  1. Continue to have the patient wear a mask   2. Room patient as soon as possible  3. Don appropriate PPE when entering room  4. Provider evaluation

## 2020-09-11 NOTE — PROCEDURES
[facility]    Procedure: Bilateral L5-S1 facet joint steroid injections    Date/Time: 9/11/2020 4:42 PM  Performed by: Anuradha Wray PA-C  Authorized by: Anuradha Wray PA-C     UNIVERSAL PROTOCOL   Site Marked: Yes  Prior Images Obtained and Reviewed:  Yes  Required items: Required blood products, implants, devices and special equipment available    Patient identity confirmed:  Verbally with patient  NA - No sedation, light sedation, or local anesthesia  Confirmation Checklist:  Patient's identity using two indicators, relevant allergies, procedure was appropriate and matched the consent or emergent situation and correct equipment/implants were available  Time out: Immediately prior to the procedure a time out was called    Universal Protocol: the Joint Commission Universal Protocol was followed    Preparation: Patient was prepped and draped in usual sterile fashion           ANESTHESIA    Anesthesia: Local infiltration  Local Anesthetic:  Lidocaine 1% without epinephrine      SEDATION    Patient Sedated: No    See dictated procedure note for full details.  PROCEDURE   Patient Tolerance:  Patient tolerated the procedure well with no immediate complications    Length of time physician/provider present for 1:1 monitoring during sedation: 0

## 2020-09-15 RX ORDER — CYCLOBENZAPRINE HCL 10 MG
10 TABLET ORAL 3 TIMES DAILY PRN
Qty: 40 TABLET | Refills: 3 | Status: SHIPPED | OUTPATIENT
Start: 2020-09-15 | End: 2021-10-31

## 2020-09-16 ENCOUNTER — THERAPY VISIT (OUTPATIENT)
Dept: PHYSICAL THERAPY | Facility: CLINIC | Age: 70
End: 2020-09-16
Payer: COMMERCIAL

## 2020-09-16 DIAGNOSIS — M54.16 LUMBAR RADICULOPATHY: ICD-10-CM

## 2020-09-16 PROCEDURE — 97110 THERAPEUTIC EXERCISES: CPT | Mod: GP | Performed by: PHYSICAL THERAPY ASSISTANT

## 2020-09-23 ENCOUNTER — THERAPY VISIT (OUTPATIENT)
Dept: PHYSICAL THERAPY | Facility: CLINIC | Age: 70
End: 2020-09-23
Payer: COMMERCIAL

## 2020-09-23 DIAGNOSIS — M54.16 LUMBAR RADICULOPATHY: ICD-10-CM

## 2020-09-23 PROCEDURE — 97110 THERAPEUTIC EXERCISES: CPT | Mod: GP | Performed by: PHYSICAL THERAPY ASSISTANT

## 2020-09-23 PROCEDURE — 97112 NEUROMUSCULAR REEDUCATION: CPT | Mod: GP | Performed by: PHYSICAL THERAPY ASSISTANT

## 2020-09-30 ENCOUNTER — TELEPHONE (OUTPATIENT)
Dept: ORTHOPEDICS | Facility: CLINIC | Age: 70
End: 2020-09-30

## 2020-09-30 NOTE — TELEPHONE ENCOUNTER
The patient called to let Dr. Arredondo know she had the second injection and she has been seeing a lot of progress with PT. She has her last visit coming up and she would like to get another order for PT. She also stated that she had a fall and injured her knee again. She said that it is getting better. I offered her an appointment with Dr. Arredondo but she stated that she preferred to wait and see if it gets better. The patient had no further questions at this time.     CAMILA El

## 2020-10-05 ENCOUNTER — OFFICE VISIT (OUTPATIENT)
Dept: OPHTHALMOLOGY | Facility: CLINIC | Age: 70
End: 2020-10-05
Attending: OPHTHALMOLOGY
Payer: COMMERCIAL

## 2020-10-05 ENCOUNTER — TELEPHONE (OUTPATIENT)
Dept: OPHTHALMOLOGY | Facility: CLINIC | Age: 70
End: 2020-10-05

## 2020-10-05 DIAGNOSIS — H43.812 PVD (POSTERIOR VITREOUS DETACHMENT), LEFT EYE: Primary | ICD-10-CM

## 2020-10-05 PROCEDURE — 92014 COMPRE OPH EXAM EST PT 1/>: CPT | Performed by: OPHTHALMOLOGY

## 2020-10-05 PROCEDURE — G0463 HOSPITAL OUTPT CLINIC VISIT: HCPCS

## 2020-10-05 PROCEDURE — 76512 OPH US DX B-SCAN: CPT | Performed by: OPHTHALMOLOGY

## 2020-10-05 ASSESSMENT — VISUAL ACUITY
METHOD: SNELLEN - LINEAR
OD_SC: 20/30
OD_SC+: +2
OS_SC: 20/400
OS_PH_SC: 20/250

## 2020-10-05 ASSESSMENT — CONF VISUAL FIELD
OS_NORMAL: 1
METHOD: COUNTING FINGERS
OD_NORMAL: 1

## 2020-10-05 ASSESSMENT — TONOMETRY
OS_IOP_MMHG: 18
IOP_METHOD: TONOPEN
OD_IOP_MMHG: 21

## 2020-10-05 ASSESSMENT — EXTERNAL EXAM - LEFT EYE: OS_EXAM: NORMAL

## 2020-10-05 ASSESSMENT — CUP TO DISC RATIO
OD_RATIO: 0.4
OS_RATIO: 0.45

## 2020-10-05 ASSESSMENT — SLIT LAMP EXAM - LIDS
COMMENTS: NORMAL
COMMENTS: NORMAL

## 2020-10-05 ASSESSMENT — EXTERNAL EXAM - RIGHT EYE: OD_EXAM: NORMAL

## 2020-10-05 NOTE — TELEPHONE ENCOUNTER
I spoke to the patient who notes that she has cloudy left eye vision with small floaters.  She notes that it started last night.  I scheduled her for appointment today.

## 2020-10-05 NOTE — NURSING NOTE
Chief Complaints and History of Present Illnesses   Patient presents with     Floaters Left Eye     Chief Complaint(s) and History of Present Illness(es)     Floaters Left Eye     Laterality: left eye (For the past day that has gotten worse and is not in a clump in the LE)    Duration: days    Associated symptoms: flashes (when she turns her eyes but it has increased)    Pain scale: 0/10              Comments     +cloudy va in the LE that started late last night  Mariela Hugo COT 3:10 PM October 5, 2020

## 2020-10-05 NOTE — PROGRESS NOTES
I have confirmed the patient's and reviewed Past Medical History, Past Surgical History, Social History, Family History, Problem List, Medication List and agree with Tech note.    CC: blurry vision left eye     HPI: started a few days ago  And very blurry vision , no pain, floaters     Assessment/plan:   1.  Posterior vitreous detachment (PVD) with sub hyaloid hemorrhage    - no Retinal detachment  or tear noted on B-scan      RTC one week     Marysol Salmeron MD PhD.  Professor & Chair

## 2020-10-10 ENCOUNTER — ANCILLARY PROCEDURE (OUTPATIENT)
Dept: GENERAL RADIOLOGY | Facility: CLINIC | Age: 70
End: 2020-10-10
Attending: FAMILY MEDICINE
Payer: COMMERCIAL

## 2020-10-10 ENCOUNTER — OFFICE VISIT (OUTPATIENT)
Dept: ORTHOPEDICS | Facility: CLINIC | Age: 70
End: 2020-10-10
Payer: COMMERCIAL

## 2020-10-10 DIAGNOSIS — M25.561 ACUTE PAIN OF RIGHT KNEE: Primary | ICD-10-CM

## 2020-10-10 PROCEDURE — 99213 OFFICE O/P EST LOW 20 MIN: CPT | Performed by: FAMILY MEDICINE

## 2020-10-10 PROCEDURE — 73562 X-RAY EXAM OF KNEE 3: CPT | Mod: RT | Performed by: RADIOLOGY

## 2020-10-10 NOTE — PROGRESS NOTES
SPORTS & ORTHOPEDIC WALK-IN VISIT 10/10/2020    Primary Care Physician: Dr. Quiñonez    Here today for follow-up of her low back pain and also to discuss a recent injury to her right knee.  On 9/24/20 had a fall due to peripheral neuropathy. Sustained mostly facial injuries such as bruising.  Landed on her right anterior knee.Shows some ecchymosis on the anterior aspect of her R shin.  Wants to discuss a progress check, she wants to discuss her current status and make sure she can avoid any other degenerative changes in the future.  Interested in following through with PT.    Overall her accident improving since her recent injection.  Still has some pain but improved.  Localizes to the lower back area.  Worse with activity.  Better with rest.         Past Medical History, Current Medications, and Allergies are reviewed in the electronic medical record as appropriate.       EXAM:There were no vitals taken for this visit.    General: Alert, pleasant, no distress  Right knee: warm, well perfused, SILT throughout     Inspection: Ecchymosis over the anterior knee and tibia.  No deformity.     ROM: Symmetric with some pain on terminal flexion     Strength: Extensor mechanism intact     Palpation: TTP over the patella.  No TTP over the joint lines or proximal tibia     Special Tests: No pain with varus or valgus stress.  Negative anterior posterior drawer.      Imaging: 3 view xrays of right knee performed and reviewed independently demonstrating notable medial joint space narrowing.  No acute fracture dislocation. See EMR for formal radiology report.         Assessment: Patient is a 70 year old female with likely patellar contusion    Recommendations:   Reviewed imaging and assessment with the patient in detail  Recommend conservative treatment with rest, icing, OTC medications  Follow-up if unimproved after 6 weeks    Bob Arredondo MD

## 2020-10-10 NOTE — LETTER
10/10/2020         RE: Nadira Perez  [address]  [facility] [address]        Dear Colleague,    Thank you for referring your patient, Nadira Perez, to the [facility]IC [place]. Please see a copy of my visit note below.          SPORTS & ORTHOPEDIC WALK-IN VISIT 10/10/2020    Primary Care Physician: Dr. Quiñoenz    Here today for follow-up of her low back pain and also to discuss a recent injury to her right knee.  On 9/24/20 had a fall due to peripheral neuropathy. Sustained mostly facial injuries such as bruising.  Landed on her right anterior knee.Shows some ecchymosis on the anterior aspect of her R shin.  Wants to discuss a progress check, she wants to discuss her current status and make sure she can avoid any other degenerative changes in the future.  Interested in following through with PT.    Overall her accident improving since her recent injection.  Still has some pain but improved.  Localizes to the lower back area.  Worse with activity.  Better with rest.         Past Medical History, Current Medications, and Allergies are reviewed in the electronic medical record as appropriate.       EXAM:There were no vitals taken for this visit.    General: Alert, pleasant, no distress  Right knee: warm, well perfused, SILT throughout     Inspection: Ecchymosis over the anterior knee and tibia.  No deformity.     ROM: Symmetric with some pain on terminal flexion     Strength: Extensor mechanism intact     Palpation: TTP over the patella.  No TTP over the joint lines or proximal tibia     Special Tests: No pain with varus or valgus stress.  Negative anterior posterior drawer.      Imaging: 3 view xrays of right knee performed and reviewed independently demonstrating notable medial joint space narrowing.  No acute fracture dislocation. See EMR for formal radiology report.         Assessment: Patient is a 70 year old female with likely patellar  contusion    Recommendations:   Reviewed imaging and assessment with the patient in detail  Recommend conservative treatment with rest, icing, OTC medications  Follow-up if unimproved after 6 weeks    Bob Arredondo MD

## 2020-10-12 ENCOUNTER — OFFICE VISIT (OUTPATIENT)
Dept: OPHTHALMOLOGY | Facility: CLINIC | Age: 70
End: 2020-10-12
Attending: OPHTHALMOLOGY
Payer: COMMERCIAL

## 2020-10-12 DIAGNOSIS — H43.812 PVD (POSTERIOR VITREOUS DETACHMENT), LEFT EYE: Primary | ICD-10-CM

## 2020-10-12 PROCEDURE — 76512 OPH US DX B-SCAN: CPT | Performed by: OPHTHALMOLOGY

## 2020-10-12 PROCEDURE — 99213 OFFICE O/P EST LOW 20 MIN: CPT | Performed by: OPHTHALMOLOGY

## 2020-10-12 PROCEDURE — G0463 HOSPITAL OUTPT CLINIC VISIT: HCPCS

## 2020-10-12 ASSESSMENT — VISUAL ACUITY
OS_SC+: +
OS_PH_SC: 20/40
OS_SC: 20/60
OD_PH_SC+: -2
OD_SC+: -1
OD_SC: 20/30
METHOD: SNELLEN - LINEAR
OD_PH_SC: 20/25

## 2020-10-12 ASSESSMENT — CUP TO DISC RATIO
OS_RATIO: 0.45
OD_RATIO: 0.4

## 2020-10-12 ASSESSMENT — TONOMETRY
IOP_METHOD: TONOPEN
OD_IOP_MMHG: 15
OS_IOP_MMHG: 17

## 2020-10-12 ASSESSMENT — CONF VISUAL FIELD
OD_NORMAL: 1
OS_SUPERIOR_NASAL_RESTRICTION: 3
METHOD: COUNTING FINGERS

## 2020-10-12 ASSESSMENT — SLIT LAMP EXAM - LIDS
COMMENTS: NORMAL
COMMENTS: NORMAL

## 2020-10-12 ASSESSMENT — EXTERNAL EXAM - LEFT EYE: OS_EXAM: NORMAL

## 2020-10-12 ASSESSMENT — EXTERNAL EXAM - RIGHT EYE: OD_EXAM: NORMAL

## 2020-10-12 ASSESSMENT — ENCOUNTER SYMPTOMS: JOINT SWELLING: 1

## 2020-10-12 NOTE — PROGRESS NOTES
I have confirmed the patient's and reviewed Past Medical History, Past Surgical History, Social History, Family History, Problem List, Medication List and agree with Tech note.    CC: blurry vision left eye     HPI: started a ten days ago  And very blurry vision , no pain, floaters     Assessment/plan:   1.  Posterior vitreous detachment (PVD) with sub hyaloid hemorrhage    - doing much better, B-scan much improved   - no Retinal detachment  or tear noted on B-scan      RTC 2 weeks     Marysol Salmeron MD PhD.  Professor & Chair

## 2020-10-12 NOTE — NURSING NOTE
Chief Complaints and History of Present Illnesses   Patient presents with     Follow Up     Posterior vitreous detachment (PVD) with sub hyaloid hemorrhage     Chief Complaint(s) and History of Present Illness(es)     Follow Up     Laterality: left eye    Course: gradually improving    Associated symptoms: floaters and dryness.  Negative for flashes (stable, has not seen any recently) and eye pain    Treatments tried: artificial tears    Pain scale: 0/10    Comments: Posterior vitreous detachment (PVD) with sub hyaloid hemorrhage              Comments     In the past three days her vision has improved in the left eye.  The clots moves around a bit in her left eye.    Luz Elena Livingston, COT 2:09 PM  October 12, 2020

## 2020-10-21 ENCOUNTER — THERAPY VISIT (OUTPATIENT)
Dept: PHYSICAL THERAPY | Facility: CLINIC | Age: 70
End: 2020-10-21
Payer: COMMERCIAL

## 2020-10-21 DIAGNOSIS — M54.16 LUMBAR RADICULOPATHY: ICD-10-CM

## 2020-10-21 PROCEDURE — 97140 MANUAL THERAPY 1/> REGIONS: CPT | Mod: GP | Performed by: PHYSICAL THERAPIST

## 2020-10-21 PROCEDURE — 97110 THERAPEUTIC EXERCISES: CPT | Mod: GP | Performed by: PHYSICAL THERAPIST

## 2020-10-21 NOTE — PROGRESS NOTES
PROGRESS  REPORT    Progress reporting period is from Jul 6, 2020 to Oct 21, 2020.       SUBJECTIVE  Subjective changes noted by patient:  Had a fall on September 24, 2020. Caught her toes and tripped, landing on her right side. Then started having vision problems from bleeding. That is resolving after surgery. Feels she is making improvement as a combo of the PT and the epidural injections. Saw a physician for her knee last week. Feels that the pain and spasms are down, has been able to stay away from muscle relaxer for the past week. Sciatica has improved since the second epidural. Still has pain on the right, top of the right buttock/right low back. Walking is still tough, catches her toes, thankful for the walker.   Felt about 20% improvement at the last visit with Evie. Progress was mostly towards the last visits.   Problems to still work on are toes catching, getting more strength, and able to walk more. Has some more knee pain since the fall.         Current pain level is 3/10.   Initial Pain level: 9/10.   Changes in function:  Yes (See Goal flowsheet attached for changes in current functional level)  Adverse reaction to treatment or activity: None    OBJECTIVE  Patient reported more pain on the left side with attempts at manual muscle testing of the hip.     SIJ Test Cluster:   Distraction: (-)  Thigh Thrust: (+)  Compression: (+)  Sacral Thrust: (+)  Gaenslen's Test: (+)    Gait: use of four wheeled walker. Does not walk with upright posture.     HIP: (* indicates patient's pain)   MMT R MMT L   Flexion 4+ 3**   abduction 3** 3-**   KNEE     Ext 4- 3**   Flx 4+ 4       Palpation: prominence and tenderness of the left PSIS.     Other:   - noteworthy that the patient was able to lay in prone.   - Reported relief with gentle long axis distraction of the left lower extremity.   - reported worsening with attempted MET for a left rotated ilium.   - Suspect pain on the left may be coming from the SIJ.          ASSESSMENT/PLAN  Updated problem list and treatment plan: Diagnosis 1:  Lumbar radiculopathy   Pain -  hot/cold therapy, manual therapy, splint/taping/bracing/orthotics, self management, education and home program  Decreased ROM/flexibility - manual therapy, therapeutic exercise, therapeutic activity and home program  Impaired gait - gait training and home program  Decreased function - therapeutic activities and home program  Impaired posture - neuro re-education, therapeutic activities and home program    STG/LTGs have been met or progress has been made towards goals:  Yes (See Goal flow sheet completed today.)  Assessment of Progress: Patient is improving, but slower than expected.   Self Management Plans:  Patient has been instructed in a home treatment program.  Patient  has been instructed in self management of symptoms.  I have re-evaluated this patient and find that the nature, scope, duration and intensity of the therapy is appropriate for the medical condition of the patient.  Nadira continues to require the following intervention to meet STG and LTG's:  PT    Recommendations:  This patient would benefit from continued therapy.     Frequency:  1 X week, once daily  Duration:  for 6 weeks        Please refer to the daily flowsheet for treatment today, total treatment time and time spent performing 1:1 timed codes.

## 2020-10-22 ENCOUNTER — OFFICE VISIT (OUTPATIENT)
Dept: INTERNAL MEDICINE | Facility: CLINIC | Age: 70
End: 2020-10-22
Payer: COMMERCIAL

## 2020-10-22 VITALS
WEIGHT: 172.4 LBS | DIASTOLIC BLOOD PRESSURE: 83 MMHG | SYSTOLIC BLOOD PRESSURE: 126 MMHG | BODY MASS INDEX: 27 KG/M2 | OXYGEN SATURATION: 98 % | HEART RATE: 87 BPM

## 2020-10-22 DIAGNOSIS — R73.09 ELEVATED HEMOGLOBIN A1C: Primary | ICD-10-CM

## 2020-10-22 DIAGNOSIS — M79.2 NEUROPATHIC PAIN: ICD-10-CM

## 2020-10-22 DIAGNOSIS — E78.00 ELEVATED LDL CHOLESTEROL LEVEL: ICD-10-CM

## 2020-10-22 DIAGNOSIS — Z00.00 ENCOUNTER FOR MEDICARE ANNUAL WELLNESS EXAM: ICD-10-CM

## 2020-10-22 DIAGNOSIS — M25.561 RIGHT KNEE PAIN, UNSPECIFIED CHRONICITY: ICD-10-CM

## 2020-10-22 DIAGNOSIS — E03.9 HYPOTHYROIDISM, UNSPECIFIED TYPE: ICD-10-CM

## 2020-10-22 DIAGNOSIS — I10 ESSENTIAL HYPERTENSION, BENIGN: ICD-10-CM

## 2020-10-22 DIAGNOSIS — L30.9 ECZEMA, UNSPECIFIED TYPE: ICD-10-CM

## 2020-10-22 DIAGNOSIS — I10 BENIGN ESSENTIAL HYPERTENSION: ICD-10-CM

## 2020-10-22 PROCEDURE — 99397 PER PM REEVAL EST PAT 65+ YR: CPT | Performed by: NURSE PRACTITIONER

## 2020-10-22 RX ORDER — TRIAMTERENE/HYDROCHLOROTHIAZID 37.5-25 MG
1 TABLET ORAL DAILY
Qty: 90 TABLET | Refills: 3 | Status: SHIPPED | OUTPATIENT
Start: 2020-10-22 | End: 2021-10-31

## 2020-10-22 RX ORDER — METOPROLOL SUCCINATE 50 MG/1
50 TABLET, EXTENDED RELEASE ORAL DAILY
Qty: 90 TABLET | Refills: 3 | Status: SHIPPED | OUTPATIENT
Start: 2020-10-22 | End: 2021-06-16 | Stop reason: ALTCHOICE

## 2020-10-22 RX ORDER — LEVOTHYROXINE SODIUM 112 UG/1
TABLET ORAL
Qty: 45 TABLET | Refills: 3 | Status: SHIPPED | OUTPATIENT
Start: 2020-10-22 | End: 2021-10-31

## 2020-10-22 RX ORDER — ZINC GLUCONATE 50 MG
50 TABLET ORAL 2 TIMES DAILY
COMMUNITY
End: 2021-05-04

## 2020-10-22 RX ORDER — TRIAMCINOLONE ACETONIDE 1 MG/G
OINTMENT TOPICAL 2 TIMES DAILY
Qty: 30 G | Refills: 11 | Status: SHIPPED | OUTPATIENT
Start: 2020-10-22 | End: 2021-10-31

## 2020-10-22 RX ORDER — LISINOPRIL 40 MG/1
40 TABLET ORAL DAILY
Qty: 90 TABLET | Refills: 3 | Status: SHIPPED | OUTPATIENT
Start: 2020-10-22 | End: 2021-05-07

## 2020-10-22 RX ORDER — GABAPENTIN 300 MG/1
CAPSULE ORAL
Qty: 540 CAPSULE | Refills: 3 | Status: SHIPPED | OUTPATIENT
Start: 2020-10-22 | End: 2021-10-31

## 2020-10-22 RX ORDER — GLYCERIN/POLYCARBOPHL/CARBOMER
2 GEL WITH PREFILLED APPLICATOR (GRAM) VAGINAL
Qty: 14 BOX | Refills: 3 | Status: SHIPPED | OUTPATIENT
Start: 2020-10-22 | End: 2021-10-28

## 2020-10-22 ASSESSMENT — PAIN SCALES - GENERAL: PAINLEVEL: NO PAIN (0)

## 2020-10-22 NOTE — PROGRESS NOTES
"S:     Ismael is here for a wellness visit.  She see multiple specialists.  She is currently being followed by Opthalmology for a posterior vitreous detachment (PVD) with sub hyaloid hemorrhage, Neurology for balance issues and right foot neuropathy. She is using a walker for balance.  She fell  Twice recently.  She has an appointment with Dr. Prajapati for her L wrist pain. She has been working with PT for Left SI jt pain. She had epidurals at [facility] on 8/14/20 anf 9/11/20. The second injection was somewhat helpful.  She has also had some recurrent right knee pain.     Samantha continues to teach nursing classes.    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     She is interested in a reviewal of her medications by Tony Heller, Pharm D.    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     Corneal opacity     Hypothyroidism     Osteopenia, unspecified location     Aromatase inhibitor use     Chronic pain of right knee     DDD (degenerative disc disease), lumbar     Degenerative scoliosis in adult patient     Lumbar radiculopathy            Past Medical History:   Diagnosis Date     Arthritis      Bone disease      Breast cancer (H)      H/O kyphoplasty      Hearing problem      History of kidney stones      History of radiation therapy      Hyperlipemia      Hypertension      Hypopotassemia      Kidney problem      Lymph edema      Medullary sponge kidney      " Osteopenia      PONV (postoperative nausea and vomiting)      Reduced vision      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     Thyroid disease             Past Surgical History:   Procedure Laterality Date     ABDOMEN SURGERY      ovarian cyst, mesh     ARTHRODESIS WRIST       ARTHRODESIS WRIST  2/14/2013    Procedure: ARTHRODESIS WRIST;  left wrist scaphoid excision, four bone fusion, iliac crest bone graft  ( Mac with block);  Surgeon: Av Mendez MD;  Location: [facility]     BIOPSY      skin, vaginal     BIOPSY OF SKIN LESION       CATARACT IOL, RT/LT Right 03/13/2018     CATARACT IOL, RT/LT Left 02/20/2018     COLONOSCOPY  12/24/2013    Procedure: COMBINED COLONOSCOPY, SINGLE BIOPSY/POLYPECTOMY BY BIOPSY;  COLONOSCOPY;  Surgeon: Dom Alvarez MD;  Location:  GI     COSMETIC SURGERY       ESOPHAGOSCOPY, GASTROSCOPY, DUODENOSCOPY (EGD), COMBINED N/A 11/23/2016    Procedure: COMBINED ESOPHAGOSCOPY, GASTROSCOPY, DUODENOSCOPY (EGD);  Surgeon: Quinten Feliciano MD;  Location:  GI     EXTERNAL EAR SURGERY      right     EYE SURGERY      radial keratomy     GRAFT BONE FROM ILIAC CREST  2/14/2013    Procedure: GRAFT BONE FROM ILIAC CREST;  mac with block and local infilitration;  Surgeon: Av Mendez MD;  Location:  OR      BREATH HYDROGEN TEST N/A 10/14/2016    Procedure: HYDROGEN BREATH TEST;  Surgeon: Cheri Barron MD;  Location:  GI     HERNIA REPAIR      UMBILICAL     HERNIA REPAIR      umbilical age 18 mos.     HERNIA REPAIR       HERNIORRHAPHY UMBILICAL  1/31/1952     HYSTERECTOMY TOTAL ABDOMINAL  5/3/00     HYSTERECTOMY TOTAL ABDOMINAL  5/3/2000     MASTECTOMY      PROPHYLACTIC RIGHT BREAST     MASTECTOMY MODIFIED RADICAL      LEFT BREAST     MASTECTOMY MODIFIED RADICAL      bilateral; right breast prophylactic     PARATHYROIDECTOMY  9/23/04    R SUPERIOR     PARATHYROIDECTOMY  9/23/2004    parathyroid resection, subtotal     REMOVE HARDWARE HAND   9/24/2013    Procedure: REMOVE HARDWARE HAND;  Left Hand Screw Removal        RHINOPLASTY  12/31/1985     RHINOPLASTY  12/31/1985     thyr proc skin closed cosmetic manner by subcuticular stitch  1/23/2009     THYROPLASTY  10/09/2009     THYROPLASTY  10/09/2009     TONSILLECTOMY  3/1/68     TONSILLECTOMY  3/1/1968     WRIST SURGERY      wrist arthrodesis            Social History     Tobacco Use     Smoking status: Never Smoker     Smokeless tobacco: Never Used   Substance Use Topics     Alcohol use: Not Currently     Comment: Rare to occasional            Family History   Problem Relation Age of Onset     Heart Disease Father         AAA     Hypertension Father      Neurologic Disorder Mother         Anuerysm of Cerebral Artery, Dementia     Diabetes Mother      Thyroid Disease Mother         ,     Cerebrovascular Disease Mother      Dementia Mother      Osteoporosis Mother      Diabetes Maternal Grandmother      Asthma Maternal Grandmother      Diabetes Maternal Aunt         x2     Melanoma Maternal Aunt      Glaucoma Maternal Aunt      Circulatory Brother         Perihperal Neurophathy     Dementia Other      Cancer Other         malignant melanoma     Hypertension Other      Hypertension Other      Cerebrovascular Disease Other      Cerebrovascular Disease Other      Obesity Other      Respiratory Other      Chronic Obstructive Pulmonary Disease Maternal Grandfather         father     Asthma Maternal Grandfather      Breast Cancer Cousin      Cancer Other      Diabetes Other      Asthma Other      Macular Degeneration No family hx of      Coronary Artery Disease No family hx of      Hyperlipidemia No family hx of      Kidney Disease No family hx of      Thrombosis No family hx of      Arthritis No family hx of      Depression No family hx of      Mental Illness No family hx of      Substance Abuse No family hx of      Cystic Fibrosis No family hx of      Early Death No family hx of      Coronary Artery Disease  Early Onset No family hx of      Heart Failure No family hx of      Bleeding Diathesis No family hx of      Ovarian Cancer No family hx of      Uterine Cancer No family hx of      Prostate Cancer No family hx of      Colorectal Cancer No family hx of      Pancreatic Cancer No family hx of      Lung Cancer No family hx of      Other Cancer No family hx of      Autoimmune Disease No family hx of      Unknown/Adopted No family hx of      Genetic Disorder No family hx of                Allergies   Allergen Reactions     Penicillins Hives        REVIEW OF SYSTEMS:    Ears/Nose/Throat: negative    Dental exam: negative    Eyes: negative.     Respiratory: negative    Cardiovascular: negative    Gastrointestinal: negative    Genitourinary: negative    Musculoskeletal: see above    Neurologic: positive for balance issues,     Skin: nl    Psychiatric: negative     Hematologic/Lymphatic/Immunologic:  negative      Endocrine:  negative    .    O:   There were no vitals taken for this visit.  GENERAL APPEARANCE: healthy, alert and no distress  EYES: EOMI,  PERRL  HENT: ear canals and TM's normal and nose and mouth without ulcers or lesions  RESP: lungs clear to auscultation - no rales, rhonchi or wheezes  CV: regular rates and rhythm, normal S1 S2, no S3 or S4 and no murmur, click or rub   ABDOMEN:  soft, nontender, no HSM or masses and bowel sounds normal  NEURO:  MUSK: using a walker due to balance.  SKIN:nl  LYMPH: nl  EXT: warm.  Edema NO   PSYCHE: normal    A/P:  Nadira was seen today for medicare visit.    Diagnoses and all orders for this visit:    Elevated hemoglobin A1c  -     Hemoglobin A1c; Future    Neuropathic pain  -     gabapentin (NEURONTIN) 300 MG capsule; Take 1-2 capsules by mouth every 8 hours    Hypothyroidism, unspecified type  -     levothyroxine (SYNTHROID/LEVOTHROID) 112 MCG tablet; TAKE 1/2 TABLET BY MOUTH DAILY  -     TSH; Future    Benign essential hypertension  -     lisinopril (ZESTRIL) 40 MG  tablet; Take 1 tablet (40 mg) by mouth daily  -     metoprolol succinate ER (TOPROL-XL) 50 MG 24 hr tablet; Take 1 tablet (50 mg) by mouth daily    Essential hypertension, benign  -     triamterene-HCTZ (MAXZIDE-25) 37.5-25 MG tablet; Take 1 tablet by mouth daily    Eczema, unspecified type  -     triamcinolone (KENALOG) 0.1 % external ointment; Apply topically 2 times daily    Right knee pain, unspecified chronicity  -     VOLTAREN 1 % topical gel; Apply 4 grams to knees or 2 grams to hands four times daily using enclosed dosing card.    Elevated LDL cholesterol level  -     Lipid panel reflex to direct LDL Fasting; Future    Other orders  -     Vaginal Lubricant (REPHRESH) GEL; Place 2 g vaginally every 3 days      Total time spent 25 minutes.  More than 50% of the time spent with Ms. Perez on counseling / coordinating her care.    Matilde LONG, CNP

## 2020-10-22 NOTE — PROGRESS NOTES
Medicare Annual Wellness Visit Previsit note    This 70 year old year old female presents for a  Medicare Wellness Exam.    Have you been to an ER or a hospital in the last year? No  What other specialists or organizations are involved in your medical care?  Ortho, neuro, onco, optho  Current providers sharing in care for this patient include:            Patient Care Team        Relationship Specialty Notifications Start End     Matilde Quiñonez APRN CNP PCP - General     10/8/12       Phone: [phone] Fax: [phone]              [address] [facility] [address] [facility] [address]     Live Barrett DPM   Podiatry   4/6/15       Phone: [phone] Fax: [phone]              [address] [facility] [address]     Matilde Quiñonez APRN CNP Referring Physician Nurse Practitioner   9/29/16       Phone: [phone] Fax: [phone]              [address] [facility] [address] [facility] [address]     Guru Kely Barrera MD MD Gastroenterology   9/29/16       Phone: [phone] Fax: [phone]              [address] [facility] [address]     Lien Prajapati MD MD Orthopedics   12/1/16       Phone: [phone] Fax: [phone]              [address] [place] [address]     Abe Wilkerson MD MD Dermatology   10/5/18       Phone: [phone] Fax: [phone]              [address] [place] [address]     Kisha Heller [facility] Pharmacist Pharmacist Clinician- Clinical Pharmacy Specialist   10/5/18       Phone: [phone]                [address] [facility] [address]     Khushbu Louise MD MD Breast Oncology Admissions 12/26/19       Phone: [phone] Fax: [phone]              [address] [facility] [address] [facility] [address]     Sandra Jara, RN Specialty Care Coordinator Breast Oncology Admissions 12/26/19       Phone: [phone] Pager: [phone]             Matilde Quiñonez APRN CNP Assigned PCP     5/21/20       Phone:  pre op discuss leep pts here with lgsil on pap but hgsil on colpo. I recommended a LEEP. I discussed the technique, recovery, and risks with patient. They include but are not limited to bleeding, infection, damage to internal organs (e.g., bladder, bowel, ureters). Patient voiced understanding and agrees to proceed. Written h and p performed. 15 min >50% of time was spent discussing findings, management, and treatment options. [phone] Fax: [phone]              [address] [place] [address] [address] [facility] [address]                  Social History      Marital Status:  Who lives in your household? Part-time roommate  Does your home have any of the following safety concerns? Loose rugs in the hallway, no grab bars in the bathroom, no handrails on the stairs or have poorly lit areas?  Yes no grab bars  Do you feel threatened or controlled by a partner, ex-partner or anyone in your life? No  Has anyone hurt you physically, for example by pushing, hitting, slapping or kicking you   or forcing you to have sex? No  Do you need help with the phone, transportation, shopping, preparing meals, housework, laundry, medications or managing money? Yes    Have you noticed any hearing difficulties? Yes        Risk Behaviors and Healthy Habits      How many servings of fruits and vegetables do you eat a day? 2  How often do you exercise and what do you do? 90 minutes a week  Do you frequently ride without a seatbelt? No  Do you use tobacco?  No  Do you use any other drugs? No                                  Do you use alcohol?Yes  Number of drinking days a month: 1-2        Sexual Health           FOR WOMEN ONLY  What year did you stop having periods? 2000  Any vaginal bleeding in the last year? No  Have you ever had an abnormal Pap smear? No    SUBJECTIVE:   Nadira Perez is a 70 year old female who presents for Preventive Visit.    Mental Health:    In general, how would you rate your overall mental or emotional health?   PHQ-2 Score: 0    Do you feel safe in your environment? Yes    Fall risk:  Fallen 2 or more times in the past year?: Yes  Any fall with injury in the past year?: Yes    Reviewed and updated as needed this visit by clinical staff  Tobacco  Allergies  Meds              Reviewed and updated as needed this visit by Provider                Social History     Tobacco Use     Smoking status: Never Smoker     Smokeless tobacco:  Never Used   Substance Use Topics     Alcohol use: Not Currently     Comment: Rare to occasional                           Current providers sharing in care for this patient include:   Patient Care Team:  Matilde Quiñonez APRN CNP as PCP - General  Live Barrett DPM (Podiatry)  Matilde Quiñonez APRN CNP as Referring Physician (Nurse Practitioner)  Guru Kely Barrera MD as MD (Gastroenterology)  Lien Prajapati MD as MD (Orthopedics)  Abe Wilkerson MD as MD (Dermatology)  Kisha Heller [facility] as Pharmacist (Pharmacist Clinician- Clinical Pharmacy Specialist)  Khushbu Louise MD as MD (Breast Oncology)  Sandra Jara, RN as Specialty Care Coordinator (Breast Oncology)  Matilde Quiñonez APRN CNP as Assigned PCP    The following health maintenance items are reviewed in Epic and correct as of today:  Health Maintenance   Topic Date Due     URINE DRUG SCREEN  1950     ADVANCE CARE PLANNING  1950     MEDICARE ANNUAL WELLNESS VISIT  10/22/2020     FALL RISK ASSESSMENT  10/22/2021     DTAP/TDAP/TD IMMUNIZATION (4 - Td) 11/06/2022     COLORECTAL CANCER SCREENING  12/24/2023     LIPID  11/11/2024     DEXA  Completed     HEPATITIS C SCREENING  Completed     PHQ-2  Completed     INFLUENZA VACCINE  Completed     Pneumococcal Vaccine: 65+ Years  Completed     ZOSTER IMMUNIZATION  Completed     Pneumococcal Vaccine: Pediatrics (0 to 5 Years) and At-Risk Patients (6 to 64 Years)  Aged Out     IPV IMMUNIZATION  Aged Out     MENINGITIS IMMUNIZATION  Aged Out     HEPATITIS B IMMUNIZATION  Aged Out       ROS:  Constitutional, HEENT, cardiovascular, pulmonary, GI, , musculoskeletal, neuro, skin, endocrine and psych systems are negative, except as otherwise noted.    OBJECTIVE:   /83 (BP Location: Right arm, Patient Position: Sitting, Cuff Size: Adult Large)   Pulse 87   Wt 78.2 kg (172 lb 6.4 oz)   SpO2 98%   Breastfeeding No   BMI 27.00 kg/m   Estimated  "body mass index is 27 kg/m  as calculated from the following:    Height as of 9/11/20: 1.702 m (5' 7\").    Weight as of this encounter: 78.2 kg (172 lb 6.4 oz).  EXAM:   GENERAL: healthy, alert and no distress  EYES: Eyes grossly normal to inspection, PERRL and conjunctivae and sclerae normal. Glasses.   HENT: ear canals and TM's normal, nose and mouth without ulcers or lesions  NECK: no adenopathy, no asymmetry, masses, or scars and thyroid normal to palpation  RESP: lungs clear to auscultation - no rales, rhonchi or wheezes  CV: regular rate and rhythm, normal S1 S2, no S3 or S4, no murmur, click or rub, no peripheral edema and peripheral pulses strong  ABDOMEN: soft, nontender, no hepatosplenomegaly, no masses and bowel sounds normal  Breasts: bilateral mastectomies.   MS: no gross musculoskeletal defects noted, no edema  SKIN: no suspicious lesions or rashes  NEURO: Normal strength and tone, mentation intact and speech normal  PSYCH: mentation appear        ASSESSMENT / PLAN:       Patient has been advised of split billing requirements and indicates understanding: Yes    COUNSELING:  Reviewed preventive health counseling, as reflected in patient instructions       Regular exercise      She reports that she has never smoked. She has never used smokeless tobacco.      MERCEDES Kyle CNP  [facility] [place]    She is at risk for falling and has been provided with information to reduce the risk of falling at home.  " No

## 2020-10-22 NOTE — NURSING NOTE
Chief Complaint   Patient presents with     Medicare Visit     Pt comes in for medicare annual wellness visit      HARPER Sinha at 10:07 AM sign on 10/22/2020

## 2020-10-24 NOTE — PATIENT INSTRUCTIONS
Patient Education   Personalized Prevention Plan  You are due for the preventive services outlined below.  Your care team is available to assist you in scheduling these services.  If you have already completed any of these items, please share that information with your care team to update in your medical record.  Health Maintenance Due   Topic Date Due     URINE DRUG SCREEN  1950     Discuss Advance Care Planning  1950       Preventing Falls in the Home  An adult or child can fall for many reasons. If you are an older adult, you may fall because your reaction time slows down. Your muscles and joints may get stiff, weak, or less flexible because of illness, medicines, or a physical condition. These things can also make a child more likely to fall or be injured in a fall.  Other health problems that make falls more likely include:    Arthritis    Dizziness or lightheadedness when you get out of bed (orthostatic hypotension)    History of a stroke    Dizziness    Anemia    Certain medicines taken for mental illness or to control blood pressure.    Problems with balance or gait    Bladder or urinary problems    History of falls with or without an injury    Changes in vision (vision impairment)    Changes in thinking skills and memory (cognitive impairment)  Injuries from a fall can include broken bones, dislocated joints, internal bleeding and cuts. When these injuries are serious enough, they can make it impossible for you or a child who is injured in a fall to live on his or her ownhome.  Prevention tips  To help prevent falls and fall-related injuries, follow the tips below.   Floors  Make floors safer by doing the following:     Put nonskid pads under area rugs.    Remove throw rugs.    Replace worn floor coverings.    Tack carpets firmly to each step on carpeted stairs. Put nonskid strips on the edges of uncarpeted stairs.    Keep floors and stairs free of clutter and cords.    Arrange furniture so  there are clear pathways.    Clean up any spills right away.    Wear shoes that fit.  Bathrooms    Make bathrooms safer by doing the following:     Install grab bars in the tub or shower.    Apply nonskid strips or put a nonskid rubber mat in the tub or shower.    Sit on a bath chair to bathe.    Use bathmats with nonskid backing.  Lighting and the environment  Improve lighting in your home by doing the following:     Keep a flashlight in each room. Or put a lamp next to the bed within easy reach.    Put nightlights in the bedrooms, hallways, kitchen, and bathrooms.    Make sure all stairways have good lighting.    Take your time when going up and down stairs.    Put handrails on both sides of stairs and in walkways for more support. To prevent injury to your wrist or arm, don t use handrails to pull yourself up.    Install grab bars to pull yourself up.    Move or rearrange items that you use often. This will make them easier to find or reach.    Look at your home to find any safety hazards. Especially look at doorways, walkways, and the driveway. Remove or repair any safety problems that you find.  Date Last Reviewed: 8/1/2016 2000-2019 [employer]. [address] [facility], [place] [address]. All rights reserved. This information is not intended as a substitute for professional medical care. Always follow your healthcare professional's instructions.

## 2020-10-26 ENCOUNTER — OFFICE VISIT (OUTPATIENT)
Dept: OPHTHALMOLOGY | Facility: CLINIC | Age: 70
End: 2020-10-26
Attending: OPHTHALMOLOGY
Payer: COMMERCIAL

## 2020-10-26 DIAGNOSIS — H43.812 PVD (POSTERIOR VITREOUS DETACHMENT), LEFT EYE: Primary | ICD-10-CM

## 2020-10-26 PROCEDURE — G0463 HOSPITAL OUTPT CLINIC VISIT: HCPCS

## 2020-10-26 PROCEDURE — 92014 COMPRE OPH EXAM EST PT 1/>: CPT | Mod: GC | Performed by: OPHTHALMOLOGY

## 2020-10-26 ASSESSMENT — VISUAL ACUITY
OD_SC: 20/25
OS_PH_SC: 20/25
OS_SC: 20/50
OS_PH_SC+: -1
OD_SC+: -2
METHOD: SNELLEN - LINEAR
OS_SC+: +2

## 2020-10-26 ASSESSMENT — TONOMETRY
IOP_METHOD: ICARE
OS_IOP_MMHG: 17
OD_IOP_MMHG: 19

## 2020-10-26 ASSESSMENT — SLIT LAMP EXAM - LIDS
COMMENTS: NORMAL
COMMENTS: NORMAL

## 2020-10-26 ASSESSMENT — CONF VISUAL FIELD
OS_NORMAL: 1
OD_NORMAL: 1
METHOD: COUNTING FINGERS

## 2020-10-26 ASSESSMENT — CUP TO DISC RATIO
OS_RATIO: 0.45
OD_RATIO: 0.4

## 2020-10-26 ASSESSMENT — EXTERNAL EXAM - RIGHT EYE: OD_EXAM: NORMAL

## 2020-10-26 ASSESSMENT — EXTERNAL EXAM - LEFT EYE: OS_EXAM: NORMAL

## 2020-10-26 NOTE — PROGRESS NOTES
I have confirmed the patient's and reviewed Past Medical History, Past Surgical History, Social History, Family History, Problem List, Medication List and agree with Tech note.    CC: blurry vision left eye     Interval History: Vision has improved since last visit.  She has an opaque floating film in the left eye.  Less dense appearing clot/floaters.  No new flashes.    HPI: Patient fell 9/24/2020.  Blurry vision left eye starting around 10/2/2020.  No pain, floaters     Assessment/plan:   1.  Posterior vitreous detachment (PVD) with sub hyaloid hemorrhage OS   - Fell 9/24/2020   - Acute blurry vision 10/2/2020   - doing much better, B-scan much improved   - no RD or tear noted on B-scan   - Still no tear on examination   - S/Sx of RD discussed for immediate return   - Recheck in 6 weeks; if no tear, resume normal eye care      RTC 6 weeks     Francesco Quesada, PGY3  Ophthalmology Resident    ATTESTATION:    I have seen and examined the patient with Dr. Quesada and agree with the findings in this note,as well as the interpretations of the diagnostic tests.    Marysol Salmeron MD PhD.  Professor & Chair

## 2020-10-26 NOTE — NURSING NOTE
Chief Complaints and History of Present Illnesses   Patient presents with     Follow Up     PVD (posterior vitreous detachment), left eye     Chief Complaint(s) and History of Present Illness(es)     Follow Up     Laterality: left eye    Onset: 3 weeks ago    Quality: blurred vision    Severity: mild    Frequency: constantly    Course: gradually improving    Associated symptoms: trauma, floaters (No change.) and dryness.  Negative for flashes, eye pain, photophobia, tearing and redness    Pain scale: 0/10    Comments: PVD (posterior vitreous detachment), left eye              Comments     Pt states vision has improved since last visit.  Clot LE is resolving slowly.  Pt has an opaque film floating around LE.  Pt only uses PFAT's.    NILS [facility] October 26, 2020 2:20 PM

## 2020-10-28 DIAGNOSIS — M25.531 RIGHT WRIST PAIN: ICD-10-CM

## 2020-10-28 DIAGNOSIS — M25.532 LEFT WRIST PAIN: Primary | ICD-10-CM

## 2020-10-29 ENCOUNTER — ANCILLARY PROCEDURE (OUTPATIENT)
Dept: GENERAL RADIOLOGY | Facility: CLINIC | Age: 70
End: 2020-10-29
Attending: ORTHOPAEDIC SURGERY
Payer: OTHER MISCELLANEOUS

## 2020-10-29 ENCOUNTER — ANCILLARY PROCEDURE (OUTPATIENT)
Dept: GENERAL RADIOLOGY | Facility: CLINIC | Age: 70
End: 2020-10-29
Attending: ORTHOPAEDIC SURGERY
Payer: COMMERCIAL

## 2020-10-29 ENCOUNTER — OFFICE VISIT (OUTPATIENT)
Dept: ORTHOPEDICS | Facility: CLINIC | Age: 70
End: 2020-10-29
Payer: OTHER MISCELLANEOUS

## 2020-10-29 DIAGNOSIS — M25.532 LEFT WRIST PAIN: Primary | ICD-10-CM

## 2020-10-29 DIAGNOSIS — M25.531 RIGHT WRIST PAIN: ICD-10-CM

## 2020-10-29 DIAGNOSIS — M25.532 LEFT WRIST PAIN: ICD-10-CM

## 2020-10-29 PROCEDURE — 73130 X-RAY EXAM OF HAND: CPT | Mod: RT | Performed by: RADIOLOGY

## 2020-10-29 PROCEDURE — 20605 DRAIN/INJ JOINT/BURSA W/O US: CPT | Mod: 50 | Performed by: ORTHOPAEDIC SURGERY

## 2020-10-29 PROCEDURE — 99207 PR DROP WITH A PROCEDURE: CPT | Performed by: ORTHOPAEDIC SURGERY

## 2020-10-29 RX ORDER — LIDOCAINE HYDROCHLORIDE 10 MG/ML
2 INJECTION, SOLUTION INFILTRATION; PERINEURAL
Status: DISCONTINUED | OUTPATIENT
Start: 2020-10-29 | End: 2021-11-03

## 2020-10-29 RX ORDER — TRIAMCINOLONE ACETONIDE 40 MG/ML
40 INJECTION, SUSPENSION INTRA-ARTICULAR; INTRAMUSCULAR
Status: DISCONTINUED | OUTPATIENT
Start: 2020-10-29 | End: 2021-11-03

## 2020-10-29 RX ADMIN — TRIAMCINOLONE ACETONIDE 40 MG: 40 INJECTION, SUSPENSION INTRA-ARTICULAR; INTRAMUSCULAR at 15:34

## 2020-10-29 RX ADMIN — LIDOCAINE HYDROCHLORIDE 2 ML: 10 INJECTION, SOLUTION INFILTRATION; PERINEURAL at 15:34

## 2020-10-29 NOTE — NURSING NOTE
[facility] [address] [address] [phone]  Dept: [phone]  ______________________________________________________________________________    Patient: Nadira Perez   : 1950   MRN: [id number]   2020    INVASIVE PROCEDURE SAFETY CHECKLIST    Date: 10/29/2020   Procedure: Bilateral radiocarpal steroid injections under fluoroscopy  Patient Name: Nadira Perez  MRN: [id number]  YOB: 1950    Action: Complete sections as appropriate. Any discrepancy results in a HARD COPY until resolved.     PRE PROCEDURE:  Patient ID verified with 2 identifiers (name and  or MRN): Yes  Procedure and site verified with patient/designee (when able): Yes  Accurate consent documentation in medical record: Yes  H&P (or appropriate assessment) documented in medical record: Yes  H&P must be up to 20 days prior to procedure and updates within 24 hours of procedure as applicable: NA  Relevant diagnostic and radiology test results appropriately labeled and displayed as applicable: Yes  Procedure site(s) marked with provider initials: NA    TIMEOUT:  Time-Out performed immediately prior to starting procedure, including verbal and active participation of all team members addressing the following:Yes  * Correct patient identify  * Confirmed that the correct side and site are marked  * An accurate procedure consent form  * Agreement on the procedure to be done  * Correct patient position  * Relevant images and results are properly labeled and appropriately displayed  * The need to administer antibiotics or fluids for irrigation purposes during the procedure as applicable   * Safety precautions based on patient history or medication use    DURING PROCEDURE: Verification of correct person, site, and procedures any time the responsibility for care of the patient is transferred to another member of the care team.       The  following medications were given:         Prior to injection, verified patient identity using patient's name and date of birth.  Due to injection administration, patient instructed to remain in clinic for 15 minutes  afterwards, and to report any adverse reaction to me immediately.      Medication Name: Lidocaine 1%, 50 mg per 5 ml  NDC: 39168-103-26  Drug Amount Wasted:  Yes: 1 ml  Vial/Syringe: Multi dose vial  Expiration Date:  01/24    Medication Name: Kenalog, 40 mg per 1 ml   NDC:[address]-7  Drug Amount Wasted:  None.  Vial/Syringe: Single dose vial  Expiration Date:  06/2022    Medication Name: Kenalog, 40 mg per 1 ml   NDC:[address]-2  Drug Amount Wasted:  None.  Vial/Syringe: Single dose vial  Expiration Date:  06/2022    Scribed by Missy Pascual LPN for Dr. Prajapati on October 29, 2020  based on the provider's statements to me.     Missy Pascual LPN

## 2020-10-29 NOTE — PROGRESS NOTES
[facility]  Orthopedics  Lien Prajapati MD  10/29/2020     Name: Nadira Perez  MRN: [id number]  Age: 69 year old  : 1950  Referring provider: Referred Self     Chief Complaint: Wrist pain    Date of Injury: chronic    History of Present Illness:   Nadira Perez is a 69 year old, right handed female work comp patient who presents today for follow-up regarding bilateral wrist pain. She was last seen in 2020, when she received an injection to the right wrist.  She states that this resolved her locking and catching symptoms completely.  They were gone until about 2 weeks ago when they returned.  She is noticed some increase in the bump over the dorsum of the wrist.  This is improved with ibuprofen.  This is not associated with any new trauma, numbness, tingling.      On the left side she is also noticed a new onset of swelling of the soft tissues much like she has noticed in the past on the right side.  She does have pain with motion and increased activity.    Review of Systems:   A 10-point review of systems was obtained and is negative except for as noted in the HPI.     Physical Examination:  General: Healthy appearing female. Affect appropriate. Normal gait. Alert and oriented to surroundings.   Right Upper Extremity: There is a 1.5 x 1.5 cm soft tissue mass over the dorsal radial side of the radiocarpal joint. There is mild tenderness to palpation over the area.  No distinct snapping or catching of the wrist is noted with range of motion but there is some mild crepitus.  Left upper extremity: Swelling over the dorsal/radial aspect of the wrist similar to similar mass on the right side.  Mild tenderness to palpation.  Crepitus with wrist range of motion but no distinct locking clicking or catching.   Bilaterally patient is able to make a full fist and demonstrate full function of muscles innervated by AIN, PIN, interossei.  Sensation is intact to light touch to median, ulnar,  radial nerve distributions.  Fingers are warm and well-perfused    Imaging:  Radiographs of the bilateral wrists - 3 views (10/29/2020)  3 views of the bilateral wrist show bilateral scaphoid excisions with 4 corner fusions. These fusions appear well healed with intact hardware. The right side lateral demonstrates an osteophyte off of the dorsal lip of the distal radius which was present in the previous x-ray. The left wrist demonstrates osteophytes at the DRUJ which are more noticeable compared to previous radiographs. There is some increased sclerosis of the radiocarpal joint compared to prior study.    I have independently reviewed the above imaging studies; the results were discussed with the patient.     Assessment:   69 year old, right handed female with bilateral wrist osteoarthritis, suspect that the swellings are related to underlying synovitis.  She is not interested in a wrist fusion or any revision surgery at this time.  She had good relief for 10 months after the last injection and is interested in repeat injection today, but bilateral injections at this time.    Plan:   Recommend corticosteroid injection into bilateral radiocarpal joints to mitigate soft tissue swelling around it and hopefully decrease the catching and locking.  Discussed that these can be repeated as necessary every three months to manage pain and symptoms as she is not interested in a wrist fusion (which would be the next surgical option)      Gissell Guadalupe MD   Hand Surgery Fellow  Orthopaedic Surgery     Pt was seen and discussed with Dr. Prajapati    PROCEDURE:  After written consent, verifying site and side, a sterile Chlora- prep was performed for the injection site the procedure was done on the right side and then again on the left side. C-arm guidance was used for placement of a 25-gauge needle into the dorsal wrist joint with 1% Lidocaine.  3 mL of Kenalog (40 mg) on the right and 2 ml of Kenalog on the right and 3 mL of  lidocaine was injected under fluoroscopic guidance with good relief of pain. Patient tolerated the procedure well.  No complications.  Follow up instructions were given.      I have personally examined this patient and have reviewed the clinical presentation and progress note with the resident. I agree with the treatment plan as outlined. The plan was formulated with the resident on the day of the resident's dictation.   Lien Prajapati MD   Hand and Upper Extremity Specialist  [facility]      Medium Joint Injection/Arthrocentesis: bilateral radiocarpal    Date/Time: 10/29/2020 3:34 PM  Performed by: Lien Prajapati MD  Authorized by: Lien Prajapati MD     Indications:  Pain  Needle Size:  25 G  Location:  Wrist  Laterality:  Bilateral  Site:  Bilateral radiocarpal  Medications (Right):  40 mg triamcinolone 40 MG/ML; 2 mL lidocaine 1 %  Medications (Left):  40 mg triamcinolone 40 MG/ML; 2 mL lidocaine 1 %  Outcome:  Tolerated well, no immediate complications  Procedure discussed: discussed risks, benefits, and alternatives    Consent Given by:  Patient  Timeout: timeout called immediately prior to procedure    Prep: patient was prepped and draped in usual sterile fashion

## 2020-10-29 NOTE — LETTER
10/29/2020         RE: Nadira Perez  [address]  [place] [address]        Dear Colleague,    Thank you for referring your patient, Nadira Perez, to the [facility] [place]. Please see a copy of my visit note below.    [facility]  Orthopedics  Lien Prajapati MD  10/29/2020     Name: Nadira Perez  MRN: [id number]  Age: 69 year old  : 1950  Referring provider: Referred Self     Chief Complaint: Wrist pain    Date of Injury: chronic    History of Present Illness:   Nadira Perez is a 69 year old, right handed female work comp patient who presents today for follow-up regarding bilateral wrist pain. She was last seen in 2020, when she received an injection to the right wrist.  She states that this resolved her locking and catching symptoms completely.  They were gone until about 2 weeks ago when they returned.  She is noticed some increase in the bump over the dorsum of the wrist.  This is improved with ibuprofen.  This is not associated with any new trauma, numbness, tingling.      On the left side she is also noticed a new onset of swelling of the soft tissues much like she has noticed in the past on the right side.  She does have pain with motion and increased activity.    Review of Systems:   A 10-point review of systems was obtained and is negative except for as noted in the HPI.     Physical Examination:  General: Healthy appearing female. Affect appropriate. Normal gait. Alert and oriented to surroundings.   Right Upper Extremity: There is a 1.5 x 1.5 cm soft tissue mass over the dorsal radial side of the radiocarpal joint. There is mild tenderness to palpation over the area.  No distinct snapping or catching of the wrist is noted with range of motion but there is some mild crepitus.  Left upper extremity: Swelling over the dorsal/radial aspect of the wrist similar to similar mass on the right side.  Mild tenderness to  palpation.  Crepitus with wrist range of motion but no distinct locking clicking or catching.   Bilaterally patient is able to make a full fist and demonstrate full function of muscles innervated by AIN, PIN, interossei.  Sensation is intact to light touch to median, ulnar, radial nerve distributions.  Fingers are warm and well-perfused    Imaging:  Radiographs of the bilateral wrists - 3 views (10/29/2020)  3 views of the bilateral wrist show bilateral scaphoid excisions with 4 corner fusions. These fusions appear well healed with intact hardware. The right side lateral demonstrates an osteophyte off of the dorsal lip of the distal radius which was present in the previous x-ray. The left wrist demonstrates osteophytes at the DRUJ which are more noticeable compared to previous radiographs. There is some increased sclerosis of the radiocarpal joint compared to prior study.    I have independently reviewed the above imaging studies; the results were discussed with the patient.     Assessment:   69 year old, right handed female with bilateral wrist osteoarthritis, suspect that the swellings are related to underlying synovitis.  She is not interested in a wrist fusion or any revision surgery at this time.  She had good relief for 10 months after the last injection and is interested in repeat injection today, but bilateral injections at this time.    Plan:   Recommend corticosteroid injection into bilateral radiocarpal joints to mitigate soft tissue swelling around it and hopefully decrease the catching and locking.  Discussed that these can be repeated as necessary every three months to manage pain and symptoms as she is not interested in a wrist fusion (which would be the next surgical option)      Gissell Guadalupe MD   Hand Surgery Fellow  Orthopaedic Surgery     Pt was seen and discussed with Dr. Prajapati    PROCEDURE:  After written consent, verifying site and side, a sterile Chlora- prep was performed for the  injection site the procedure was done on the right side and then again on the left side. C-arm guidance was used for placement of a 25-gauge needle into the dorsal wrist joint with 1% Lidocaine.  3 mL of Kenalog (40 mg) on the right and 2 ml of Kenalog on the right and 3 mL of lidocaine was injected under fluoroscopic guidance with good relief of pain. Patient tolerated the procedure well.  No complications.  Follow up instructions were given.      I have personally examined this patient and have reviewed the clinical presentation and progress note with the resident. I agree with the treatment plan as outlined. The plan was formulated with the resident on the day of the resident's dictation.   Lien Prajapati MD   Hand and Upper Extremity Specialist  [facility]      Medium Joint Injection/Arthrocentesis: bilateral radiocarpal    Date/Time: 10/29/2020 3:34 PM  Performed by: Lien Prajapati MD  Authorized by: Lien Prajapati MD     Indications:  Pain  Needle Size:  25 G  Location:  Wrist  Laterality:  Bilateral  Site:  Bilateral radiocarpal  Medications (Right):  40 mg triamcinolone 40 MG/ML; 2 mL lidocaine 1 %  Medications (Left):  40 mg triamcinolone 40 MG/ML; 2 mL lidocaine 1 %  Outcome:  Tolerated well, no immediate complications  Procedure discussed: discussed risks, benefits, and alternatives    Consent Given by:  Patient  Timeout: timeout called immediately prior to procedure    Prep: patient was prepped and draped in usual sterile fashion

## 2020-10-29 NOTE — NURSING NOTE
Reason For Visit:   Chief Complaint   Patient presents with     Follow Up     W/C. Pain, bilateral wrist. Intermittent locking and swelling, right hand. Patient stated that she has increased pain when she extends her hand.  New issue, cyst, left wrist. Patient stated that she takes 600 TID of ibuprofen.        Pain Assessment  Patient Currently in Pain: Yes  Primary Pain Location: (Bilateral hand/wrist.)        Allergies   Allergen Reactions     Penicillins Hives           Missy Pascual LPN

## 2020-11-03 ENCOUNTER — OFFICE VISIT (OUTPATIENT)
Dept: DERMATOLOGY | Facility: CLINIC | Age: 70
End: 2020-11-03
Payer: COMMERCIAL

## 2020-11-03 DIAGNOSIS — L82.0 INFLAMED SEBORRHEIC KERATOSIS: Primary | ICD-10-CM

## 2020-11-03 PROCEDURE — 17110 DESTRUCTION B9 LES UP TO 14: CPT | Performed by: DERMATOLOGY

## 2020-11-03 ASSESSMENT — PAIN SCALES - GENERAL: PAINLEVEL: NO PAIN (0)

## 2020-11-03 NOTE — LETTER
11/3/2020       RE: Nadira Perez  [id number]  [facility] [address]     Dear Colleague,    Thank you for referring your patient, Nadira Perez, to the [facility] [place] at [facility]. Please see a copy of my visit note below.    [facility] Dermatology Note      Dermatology Problem List:  1. History of SCC of the perineum, tx w/ excision and radiation   - Last TBSE 10/22/19   2. SK's. Multiple over chest back and face, and legs   - Cryo (11/3/20)  3. Tinea pedis w/ onychomycosis              - Terbinafine cream  4. Seborrheic dermatitis              - 2% ketoconazole shampoo, alternate with Head and Shoulders  4. Hand dermatitis: Triamcinolone 0.1% cream PRN    Encounter Date: Nov 3, 2020    CC:  Chief Complaint   Patient presents with     Skin Check     annual skin check - Nadira notes a few areas of concern.       History of Present Illness:  Ms. Nadira Perez is a 70 year old female who presents for a skin check. The patient reports she would like her annual skin check and has multiple skin tags and SKs she would like looked at in her groin and on her lower abdomen. She denies any bleeding, itching, or any other concerns.      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     Corneal opacity     Hypothyroidism     Osteopenia, unspecified location     Aromatase inhibitor use      Chronic pain of right knee     DDD (degenerative disc disease), lumbar     Degenerative scoliosis in adult patient     Lumbar radiculopathy     Past Medical History:   Diagnosis Date     Arthritis      Bone disease      Breast cancer (H)      H/O kyphoplasty      Hearing problem      History of kidney stones      History of radiation therapy      Hyperlipemia      Hypertension      Hypopotassemia      Kidney problem      Lymph edema      Medullary sponge kidney      Osteopenia      PONV (postoperative nausea and vomiting)      Reduced vision      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     Thyroid disease      Past Surgical History:   Procedure Laterality Date     ABDOMEN SURGERY      ovarian cyst, mesh     ARTHRODESIS WRIST       ARTHRODESIS WRIST  2/14/2013    Procedure: ARTHRODESIS WRIST;  left wrist scaphoid excision, four bone fusion, iliac crest bone graft  ( Mac with block);  Surgeon: Av Mendez MD;  [facility]: [facility]     BIOPSY      skin, vaginal     BIOPSY OF SKIN LESION       CATARACT IOL, RT/LT Right 03/13/2018     CATARACT IOL, RT/LT Left 02/20/2018     COLONOSCOPY  12/24/2013    Procedure: COMBINED COLONOSCOPY, SINGLE BIOPSY/POLYPECTOMY BY BIOPSY;  COLONOSCOPY;  Surgeon: Dom Alvarez MD;  Location:  GI     COSMETIC SURGERY       ESOPHAGOSCOPY, GASTROSCOPY, DUODENOSCOPY (EGD), COMBINED N/A 11/23/2016    Procedure: COMBINED ESOPHAGOSCOPY, GASTROSCOPY, DUODENOSCOPY (EGD);  Surgeon: Quinten Feliciano MD;  Location:  GI     EXTERNAL EAR SURGERY      right     EYE SURGERY      radial keratomy     GRAFT BONE FROM ILIAC CREST  2/14/2013    Procedure: GRAFT BONE FROM ILIAC CREST;  mac with block and local infilitration;  Surgeon: Av Mendez MD;  Location:  OR     HC BREATH HYDROGEN TEST N/A 10/14/2016    Procedure: HYDROGEN BREATH TEST;  Surgeon: Cheri Barron MD;  Location:  GI     HERNIA REPAIR      UMBILICAL     HERNIA REPAIR      umbilical age 18  mos.     HERNIA REPAIR       HERNIORRHAPHY UMBILICAL  1/31/1954     HYSTERECTOMY TOTAL ABDOMINAL  5/3/00     HYSTERECTOMY TOTAL ABDOMINAL  5/3/2000     MASTECTOMY      PROPHYLACTIC RIGHT BREAST     MASTECTOMY MODIFIED RADICAL      LEFT BREAST     MASTECTOMY MODIFIED RADICAL      bilateral; right breast prophylactic     PARATHYROIDECTOMY  9/23/04    R SUPERIOR     PARATHYROIDECTOMY  9/23/2004    parathyroid resection, subtotal     REMOVE HARDWARE HAND  9/24/2013    Procedure: REMOVE HARDWARE HAND;  Left Hand Screw Removal        RHINOPLASTY  12/31/1985     RHINOPLASTY  12/31/1985     thyr proc skin closed cosmetic manner by subcuticular stitch  1/23/2009     THYROPLASTY  10/09/2009     THYROPLASTY  10/09/2009     TONSILLECTOMY  3/1/68     TONSILLECTOMY  3/1/1968     WRIST SURGERY      wrist arthrodesis       Social History:  Patient reports that she has never smoked. She has never used smokeless tobacco. She reports previous alcohol use. She reports that she does not use drugs.    Family History:  Family History   Problem Relation Age of Onset     Heart Disease Father         AAA     Hypertension Father      Neurologic Disorder Mother         Anuerysm of Cerebral Artery, Dementia     Diabetes Mother      Thyroid Disease Mother         ,     Cerebrovascular Disease Mother      Dementia Mother      Osteoporosis Mother      Diabetes Maternal Grandmother      Asthma Maternal Grandmother      Diabetes Maternal Aunt         x2     Melanoma Maternal Aunt      Glaucoma Maternal Aunt      Circulatory Brother         Perihperal Neurophathy     Dementia Other      Cancer Other         malignant melanoma     Hypertension Other      Hypertension Other      Cerebrovascular Disease Other      Cerebrovascular Disease Other      Obesity Other      Respiratory Other      Chronic Obstructive Pulmonary Disease Maternal Grandfather         father     Asthma Maternal Grandfather      Breast Cancer Cousin      Cancer Other      Diabetes  Other      Asthma Other      Macular Degeneration No family hx of      Coronary Artery Disease No family hx of      Hyperlipidemia No family hx of      Kidney Disease No family hx of      Thrombosis No family hx of      Arthritis No family hx of      Depression No family hx of      Mental Illness No family hx of      Substance Abuse No family hx of      Cystic Fibrosis No family hx of      Early Death No family hx of      Coronary Artery Disease Early Onset No family hx of      Heart Failure No family hx of      Bleeding Diathesis No family hx of      Ovarian Cancer No family hx of      Uterine Cancer No family hx of      Prostate Cancer No family hx of      Colorectal Cancer No family hx of      Pancreatic Cancer No family hx of      Lung Cancer No family hx of      Other Cancer No family hx of      Autoimmune Disease No family hx of      Unknown/Adopted No family hx of      Genetic Disorder No family hx of        Medications:  Current Outpatient Medications   Medication Sig Dispense Refill     Acetaminophen (APAP 500 PO) Take 1,000 mg by mouth 3 times daily as needed        amLODIPine (NORVASC) 10 MG tablet Take 1 tablet (10 mg) by mouth daily 90 tablet 3     anastrozole (ARIMIDEX) 1 MG tablet TAKE 1 TABLET BY MOUTH EVERY DAY 90 tablet 3     Ascorbic Acid (VITAMIN C) 500 MG CHEW Take 1 tablet by mouth daily       atorvastatin (LIPITOR) 40 MG tablet Take 1 tablet (40 mg) by mouth daily 90 tablet 2     cholecalciferol 2000 units CAPS Take 6,000 Units by mouth daily 270 capsule 3     Coenzyme Q10 (COQ10) 200 MG CAPS Take 1 capsule by mouth daily       Cranberry 600 MG TABS Take 650 mg by mouth daily       cyclobenzaprine (FLEXERIL) 10 MG tablet Take 1 tablet (10 mg) by mouth 3 times daily as needed for muscle spasms 40 tablet 3     gabapentin (NEURONTIN) 300 MG capsule Take 1-2 capsules by mouth every 8 hours 540 capsule 3     glucosamine-chondroitin 500-400 MG CAPS per capsule Take 1 capsule by mouth 2 times daily         HYDROcodone-acetaminophen (NORCO) 5-325 MG tablet Take 1 tablet by mouth every 6 hours as needed for severe pain 40 tablet 0     ibuprofen (ADVIL/MOTRIN) 600 MG tablet Take 800 mg by mouth every 8 hours as needed for moderate pain        levothyroxine (SYNTHROID/LEVOTHROID) 112 MCG tablet TAKE 1/2 TABLET BY MOUTH DAILY 45 tablet 3     lisinopril (ZESTRIL) 40 MG tablet Take 1 tablet (40 mg) by mouth daily 90 tablet 3     LORazepam (ATIVAN) 2 MG tablet Take 1 tablet at night for sleep 30 tablet 1     Magnesium Oxide 250 MG TABS Take 1 tablet by mouth daily       melatonin 5 MG tablet Take 1 tablet by mouth daily       metoprolol succinate ER (TOPROL-XL) 50 MG 24 hr tablet Take 1 tablet (50 mg) by mouth daily 90 tablet 3     Multiple Vitamin (MULTI-VITAMIN) per tablet Take 1 tablet by mouth daily.       Omega-3 Fatty Acids (OMEGA-3 FISH OIL PO) Take 1 capsule by mouth daily        ondansetron (ZOFRAN ODT) 4 MG ODT tab Take 1 tablet (4 mg) by mouth every 8 hours as needed for nausea 20 tablet 3     ondansetron (ZOFRAN-ODT) 4 MG ODT tab Take 1 tablet (4 mg) by mouth every 8 hours as needed for nausea 40 tablet 3     potassium chloride ER (KLOR-CON M) 20 MEQ CR tablet Take 20 meq  Three times a week. 36 tablet 3     predniSONE (DELTASONE) 20 MG tablet Take 2 tablets (40 mg) by mouth daily 10 tablet 0     terbinafine (LAMISIL AT) 1 % external cream Apply topically 2 times daily For fungal infection not resolved with other antifungals (e.g. Clotrimazole) 24 g 11     tiZANidine (ZANAFLEX) 2 MG tablet Take 1-2 tablets (2-4 mg) by mouth 3 times daily as needed for muscle spasms 30 tablet 1     triamcinolone (KENALOG) 0.1 % external ointment Apply topically 2 times daily 30 g 11     triamterene-HCTZ (MAXZIDE-25) 37.5-25 MG tablet Take 1 tablet by mouth daily 90 tablet 3     Vaginal Lubricant (REPHRESH) GEL Place 2 g vaginally every 3 days 14 Box 3     VOLTAREN 1 % topical gel Apply 4 grams to knees or 2 grams to hands four  times daily using enclosed dosing card. 100 g 1     zinc gluconate 50 MG tablet Take 50 mg by mouth 2 times daily       Allergies   Allergen Reactions     Penicillins Hives         Review of Systems:  -Constitutional: Otherwise feeling well today, in usual state of health.  -Skin: As above in HPI. No additional skin concerns.  -As per HPI    Physical exam:  Vitals: There were no vitals taken for this visit.  GEN: This is a well developed, well-nourished female in no acute distress, in a pleasant mood.    SKIN: Full skin, which includes the head/face, both arms, chest, back, abdomen,both legs, genitalia and/or groin buttocks, digits and/or nails, was examined.  - diffuse benign SKs over back   - 3 inflamed SKs on left inguinal fold, 3 inflamed SKs on right inguinal fold  - multiple SKs across lower abdomen, one multicolored black and brown   - ill defined erythematous patch left inguinal fold secondary to shaving and elastic   - ecchymosis over right anterior shin and dorsal foot near great toe, 2nd and 3rd digits  - No other lesions of concern on areas examined.     Labs:  None    Impression/Plan:  1. Seborrheic keratosis, inflamed, multiple - groin, lower abdomen, back  - Cryotherapy procedure note (performed by faculty): After verbal consent and discussion of risks and benefits including but no limited to dyspigmentation/scar, blister, infection, recurrence, were treated with 1-2mm freeze border for 2 cycles with liquid nitrogen. Post cryotherapy instructions were provided.    - 3 left inguinal fold   - 3 right inguinal fold   - 1 central lower abdomen, 1 right lower abdomen   - 2 left mid back     Follow-up in 1 year, earlier for new or changing lesions.     Staff Involved:  I, Maya May, MS4, saw and examined the patient in the presence of Dr. Wilkerson.    Staff Physician:  I was present with the medical student who participated in the service and in the documentation of the note. I have verified the history  and personally performed the physical exam and medical decision making. I agree with the assessment and plan of care as documented in the note.     Abe Wilkerson MD  Staff Dermatologist and Dermatopathologist  , Department of Dermatology

## 2020-11-03 NOTE — PATIENT INSTRUCTIONS
Cryotherapy    What is it?    Use of a very cold liquid, such as liquid nitrogen, to freeze and destroy abnormal skin cells that need to be removed    What should I expect?    Tenderness and redness    A small blister that might grow and fill with dark purple blood. There may be crusting.    More than one treatment may be needed if the lesions do not go away.    How do I care for the treated area?    Gently wash the area with your hands when bathing.    Use a thin layer of Vaseline to help with healing. You may use a Band-Aid.     The area should heal within 7-10 days and may leave behind a pink or lighter color.     Do not use an antibiotic or Neosporin ointment.     You may take acetaminophen (Tylenol) for pain.     Call your Doctor if you have:    Severe pain    Signs of infection (warmth, redness, cloudy yellow drainage, and or a bad smell)    Questions or concerns    Who should I call with questions?       [facility]: [phone]       [facility]: [phone]       For urgent needs outside of business hours call the [facility] at [phone]        and ask for the dermatology resident on call

## 2020-11-03 NOTE — PROGRESS NOTES
[facility] Dermatology Note      Dermatology Problem List:  1. History of SCC of the perineum, tx w/ excision and radiation   - Last TBSE 10/22/19   2. SK's. Multiple over chest back and face, and legs   - Cryo (11/3/20)  3. Tinea pedis w/ onychomycosis              - Terbinafine cream  4. Seborrheic dermatitis              - 2% ketoconazole shampoo, alternate with Head and Shoulders  4. Hand dermatitis: Triamcinolone 0.1% cream PRN    Encounter Date: Nov 3, 2020    CC:  Chief Complaint   Patient presents with     Skin Check     annual skin check - Nadira notes a few areas of concern.       History of Present Illness:  Ms. Nadira Perez is a 70 year old female who presents for a skin check. The patient reports she would like her annual skin check and has multiple skin tags and SKs she would like looked at in her groin and on her lower abdomen. She denies any bleeding, itching, or any other concerns.      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     Corneal opacity     Hypothyroidism     Osteopenia, unspecified location     Aromatase inhibitor use     Chronic pain of right knee     DDD (degenerative disc disease), lumbar     Degenerative scoliosis in adult patient     Lumbar radiculopathy     Past Medical History:   Diagnosis Date     Arthritis      Bone disease      Breast cancer (H)      H/O kyphoplasty      Hearing problem      History of kidney stones       History of radiation therapy      Hyperlipemia      Hypertension      Hypopotassemia      Kidney problem      Lymph edema      Medullary sponge kidney      Osteopenia      PONV (postoperative nausea and vomiting)      Reduced vision      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     Thyroid disease      Past Surgical History:   Procedure Laterality Date     ABDOMEN SURGERY      ovarian cyst, mesh     ARTHRODESIS WRIST       ARTHRODESIS WRIST  2/14/2013    Procedure: ARTHRODESIS WRIST;  left wrist scaphoid excision, four bone fusion, iliac crest bone graft  ( Mac with block);  Surgeon: Av Mendez MD;  Location: [facility]     BIOPSY      skin, vaginal     BIOPSY OF SKIN LESION       CATARACT IOL, RT/LT Right 03/13/2018     CATARACT IOL, RT/LT Left 02/20/2018     COLONOSCOPY  12/24/2013    Procedure: COMBINED COLONOSCOPY, SINGLE BIOPSY/POLYPECTOMY BY BIOPSY;  COLONOSCOPY;  Surgeon: Dom Alvarez MD;  Location:  GI     COSMETIC SURGERY       ESOPHAGOSCOPY, GASTROSCOPY, DUODENOSCOPY (EGD), COMBINED N/A 11/23/2016    Procedure: COMBINED ESOPHAGOSCOPY, GASTROSCOPY, DUODENOSCOPY (EGD);  Surgeon: Quinten Feliciano MD;  Location:  GI     EXTERNAL EAR SURGERY      right     EYE SURGERY      radial keratomy     GRAFT BONE FROM ILIAC CREST  2/14/2013    Procedure: GRAFT BONE FROM ILIAC CREST;  mac with block and local infilitration;  Surgeon: Av Mendez MD;  Location:  OR      BREATH HYDROGEN TEST N/A 10/14/2016    Procedure: HYDROGEN BREATH TEST;  Surgeon: Cheri Barron MD;  Location:  GI     HERNIA REPAIR      UMBILICAL     HERNIA REPAIR      umbilical age 18 mos.     HERNIA REPAIR       HERNIORRHAPHY UMBILICAL  [birth year]31/1954     HYSTERECTOMY TOTAL ABDOMINAL  5/3/00     HYSTERECTOMY TOTAL ABDOMINAL  5/3/2000     MASTECTOMY      PROPHYLACTIC RIGHT BREAST     MASTECTOMY MODIFIED RADICAL      LEFT BREAST     MASTECTOMY MODIFIED RADICAL      bilateral; right breast  prophylactic     PARATHYROIDECTOMY  9/23/04    R SUPERIOR     PARATHYROIDECTOMY  9/23/2004    parathyroid resection, subtotal     REMOVE HARDWARE HAND  9/24/2013    Procedure: REMOVE HARDWARE HAND;  Left Hand Screw Removal        RHINOPLASTY  12/31/1985     RHINOPLASTY  12/31/1985     thyr proc skin closed cosmetic manner by subcuticular stitch  1/23/2009     THYROPLASTY  10/09/2009     THYROPLASTY  10/09/2009     TONSILLECTOMY  [birth year]/1/68     TONSILLECTOMY  3/1/1968     WRIST SURGERY      wrist arthrodesis       Social History:  Patient reports that she has never smoked. She has never used smokeless tobacco. She reports previous alcohol use. She reports that she does not use drugs.    Family History:  Family History   Problem Relation Age of Onset     Heart Disease Father         AAA     Hypertension Father      Neurologic Disorder Mother         Anuerysm of Cerebral Artery, Dementia     Diabetes Mother      Thyroid Disease Mother         ,     Cerebrovascular Disease Mother      Dementia Mother      Osteoporosis Mother      Diabetes Maternal Grandmother      Asthma Maternal Grandmother      Diabetes Maternal Aunt         x2     Melanoma Maternal Aunt      Glaucoma Maternal Aunt      Circulatory Brother         Perihperal Neurophathy     Dementia Other      Cancer Other         malignant melanoma     Hypertension Other      Hypertension Other      Cerebrovascular Disease Other      Cerebrovascular Disease Other      Obesity Other      Respiratory Other      Chronic Obstructive Pulmonary Disease Maternal Grandfather         father     Asthma Maternal Grandfather      Breast Cancer Cousin      Cancer Other      Diabetes Other      Asthma Other      Macular Degeneration No family hx of      Coronary Artery Disease No family hx of      Hyperlipidemia No family hx of      Kidney Disease No family hx of      Thrombosis No family hx of      Arthritis No family hx of      Depression No family hx of      Mental Illness No  family hx of      Substance Abuse No family hx of      Cystic Fibrosis No family hx of      Early Death No family hx of      Coronary Artery Disease Early Onset No family hx of      Heart Failure No family hx of      Bleeding Diathesis No family hx of      Ovarian Cancer No family hx of      Uterine Cancer No family hx of      Prostate Cancer No family hx of      Colorectal Cancer No family hx of      Pancreatic Cancer No family hx of      Lung Cancer No family hx of      Other Cancer No family hx of      Autoimmune Disease No family hx of      Unknown/Adopted No family hx of      Genetic Disorder No family hx of        Medications:  Current Outpatient Medications   Medication Sig Dispense Refill     Acetaminophen (APAP 500 PO) Take 1,000 mg by mouth 3 times daily as needed        amLODIPine (NORVASC) 10 MG tablet Take 1 tablet (10 mg) by mouth daily 90 tablet 3     anastrozole (ARIMIDEX) 1 MG tablet TAKE 1 TABLET BY MOUTH EVERY DAY 90 tablet 3     Ascorbic Acid (VITAMIN C) 500 MG CHEW Take 1 tablet by mouth daily       atorvastatin (LIPITOR) 40 MG tablet Take 1 tablet (40 mg) by mouth daily 90 tablet 2     cholecalciferol 2000 units CAPS Take 6,000 Units by mouth daily 270 capsule 3     Coenzyme Q10 (COQ10) 200 MG CAPS Take 1 capsule by mouth daily       Cranberry 600 MG TABS Take 650 mg by mouth daily       cyclobenzaprine (FLEXERIL) 10 MG tablet Take 1 tablet (10 mg) by mouth 3 times daily as needed for muscle spasms 40 tablet 3     gabapentin (NEURONTIN) 300 MG capsule Take 1-2 capsules by mouth every 8 hours 540 capsule 3     glucosamine-chondroitin 500-400 MG CAPS per capsule Take 1 capsule by mouth 2 times daily        HYDROcodone-acetaminophen (NORCO) 5-325 MG tablet Take 1 tablet by mouth every 6 hours as needed for severe pain 40 tablet 0     ibuprofen (ADVIL/MOTRIN) 600 MG tablet Take 800 mg by mouth every 8 hours as needed for moderate pain        levothyroxine (SYNTHROID/LEVOTHROID) 112 MCG tablet TAKE  1/2 TABLET BY MOUTH DAILY 45 tablet 3     lisinopril (ZESTRIL) 40 MG tablet Take 1 tablet (40 mg) by mouth daily 90 tablet 3     LORazepam (ATIVAN) 2 MG tablet Take 1 tablet at night for sleep 30 tablet 1     Magnesium Oxide 250 MG TABS Take 1 tablet by mouth daily       melatonin 5 MG tablet Take 1 tablet by mouth daily       metoprolol succinate ER (TOPROL-XL) 50 MG 24 hr tablet Take 1 tablet (50 mg) by mouth daily 90 tablet 3     Multiple Vitamin (MULTI-VITAMIN) per tablet Take 1 tablet by mouth daily.       Omega-3 Fatty Acids (OMEGA-3 FISH OIL PO) Take 1 capsule by mouth daily        ondansetron (ZOFRAN ODT) 4 MG ODT tab Take 1 tablet (4 mg) by mouth every 8 hours as needed for nausea 20 tablet 3     ondansetron (ZOFRAN-ODT) 4 MG ODT tab Take 1 tablet (4 mg) by mouth every 8 hours as needed for nausea 40 tablet 3     potassium chloride ER (KLOR-CON M) 20 MEQ CR tablet Take 20 meq  Three times a week. 36 tablet 3     predniSONE (DELTASONE) 20 MG tablet Take 2 tablets (40 mg) by mouth daily 10 tablet 0     terbinafine (LAMISIL AT) 1 % external cream Apply topically 2 times daily For fungal infection not resolved with other antifungals (e.g. Clotrimazole) 24 g 11     tiZANidine (ZANAFLEX) 2 MG tablet Take 1-2 tablets (2-4 mg) by mouth 3 times daily as needed for muscle spasms 30 tablet 1     triamcinolone (KENALOG) 0.1 % external ointment Apply topically 2 times daily 30 g 11     triamterene-HCTZ (MAXZIDE-25) 37.5-25 MG tablet Take 1 tablet by mouth daily 90 tablet 3     Vaginal Lubricant (REPHRESH) GEL Place 2 g vaginally every 3 days 14 Box 3     VOLTAREN 1 % topical gel Apply 4 grams to knees or 2 grams to hands four times daily using enclosed dosing card. 100 g 1     zinc gluconate 50 MG tablet Take 50 mg by mouth 2 times daily       Allergies   Allergen Reactions     Penicillins Hives         Review of Systems:  -Constitutional: Otherwise feeling well today, in usual state of health.  -Skin: As above in HPI.  No additional skin concerns.  -As per HPI    Physical exam:  Vitals: There were no vitals taken for this visit.  GEN: This is a well developed, well-nourished female in no acute distress, in a pleasant mood.    SKIN: Full skin, which includes the head/face, both arms, chest, back, abdomen,both legs, genitalia and/or groin buttocks, digits and/or nails, was examined.  - diffuse benign SKs over back   - 3 inflamed SKs on left inguinal fold, 3 inflamed SKs on right inguinal fold  - multiple SKs across lower abdomen, one multicolored black and brown   - ill defined erythematous patch left inguinal fold secondary to shaving and elastic   - ecchymosis over right anterior shin and dorsal foot near great toe, 2nd and 3rd digits  - No other lesions of concern on areas examined.     Labs:  None    Impression/Plan:  1. Seborrheic keratosis, inflamed, multiple - groin, lower abdomen, back  - Cryotherapy procedure note (performed by faculty): After verbal consent and discussion of risks and benefits including but no limited to dyspigmentation/scar, blister, infection, recurrence, were treated with 1-2mm freeze border for 2 cycles with liquid nitrogen. Post cryotherapy instructions were provided.    - 3 left inguinal fold   - 3 right inguinal fold   - 1 central lower abdomen, 1 right lower abdomen   - 2 left mid back     Follow-up in 1 year, earlier for new or changing lesions.     Staff Involved:  I, Maya May, MS4, saw and examined the patient in the presence of Dr. Wilkerson.    Staff Physician:  I was present with the medical student who participated in the service and in the documentation of the note. I have verified the history and personally performed the physical exam and medical decision making. I agree with the assessment and plan of care as documented in the note.     Abe Wilkerson MD  Staff Dermatologist and Dermatopathologist  , Department of Dermatology

## 2020-11-03 NOTE — NURSING NOTE
Dermatology Rooming Note    Nadira Perez's goals for this visit include:   Chief Complaint   Patient presents with     Skin Check     annual skin check - Nadira notes a few areas of concern.     Olya Gooden, CMA

## 2020-11-08 ENCOUNTER — TELEPHONE (OUTPATIENT)
Dept: ORTHOPEDICS | Facility: CLINIC | Age: 70
End: 2020-11-08

## 2020-11-08 NOTE — TELEPHONE ENCOUNTER
Patient LVM on walk in phone wanting to be seen tomorrow for her right foot. I called the patient back, and she explains that she slipped and hyperextended all of her toes on her right foot and she mentions that she hit her right knee when she fell down. She is concerned for a fracture and would like to be seen tomorrow prior to her oncology appointment to rule out fracture. She reports a hematoma on her 3rd toe, but no deformities. The patient is scheduled at 2 pm tomorrow with Dr. Borjas.

## 2020-11-09 ENCOUNTER — INFUSION THERAPY VISIT (OUTPATIENT)
Dept: ONCOLOGY | Facility: CLINIC | Age: 70
End: 2020-11-09
Attending: INTERNAL MEDICINE
Payer: COMMERCIAL

## 2020-11-09 ENCOUNTER — OFFICE VISIT (OUTPATIENT)
Dept: ORTHOPEDICS | Facility: CLINIC | Age: 70
End: 2020-11-09
Payer: COMMERCIAL

## 2020-11-09 ENCOUNTER — ANCILLARY PROCEDURE (OUTPATIENT)
Dept: GENERAL RADIOLOGY | Facility: CLINIC | Age: 70
End: 2020-11-09
Attending: FAMILY MEDICINE
Payer: COMMERCIAL

## 2020-11-09 VITALS
OXYGEN SATURATION: 97 % | TEMPERATURE: 98.2 F | DIASTOLIC BLOOD PRESSURE: 90 MMHG | RESPIRATION RATE: 16 BRPM | HEART RATE: 96 BPM | SYSTOLIC BLOOD PRESSURE: 152 MMHG

## 2020-11-09 DIAGNOSIS — Z79.811 AROMATASE INHIBITOR USE: ICD-10-CM

## 2020-11-09 DIAGNOSIS — M85.80 OSTEOPENIA, UNSPECIFIED LOCATION: ICD-10-CM

## 2020-11-09 DIAGNOSIS — M79.671 RIGHT FOOT PAIN: Primary | ICD-10-CM

## 2020-11-09 DIAGNOSIS — Z17.0 MALIGNANT NEOPLASM OF LEFT BREAST IN FEMALE, ESTROGEN RECEPTOR POSITIVE, UNSPECIFIED SITE OF BREAST (H): ICD-10-CM

## 2020-11-09 DIAGNOSIS — C50.912 MALIGNANT NEOPLASM OF LEFT BREAST IN FEMALE, ESTROGEN RECEPTOR POSITIVE, UNSPECIFIED SITE OF BREAST (H): ICD-10-CM

## 2020-11-09 DIAGNOSIS — M81.0 OSTEOPOROSIS: ICD-10-CM

## 2020-11-09 DIAGNOSIS — C50.919 MALIGNANT NEOPLASM OF FEMALE BREAST (H): Primary | ICD-10-CM

## 2020-11-09 DIAGNOSIS — E03.9 HYPOTHYROIDISM, UNSPECIFIED TYPE: ICD-10-CM

## 2020-11-09 LAB
ALBUMIN SERPL-MCNC: 4.1 G/DL (ref 3.4–5)
CALCIUM SERPL-MCNC: 9.2 MG/DL (ref 8.5–10.1)
CREAT SERPL-MCNC: 0.72 MG/DL (ref 0.52–1.04)
GFR SERPL CREATININE-BSD FRML MDRD: 86 ML/MIN/{1.73_M2}

## 2020-11-09 PROCEDURE — 84443 ASSAY THYROID STIM HORMONE: CPT | Performed by: NURSE PRACTITIONER

## 2020-11-09 PROCEDURE — G0463 HOSPITAL OUTPT CLINIC VISIT: HCPCS

## 2020-11-09 PROCEDURE — 96374 THER/PROPH/DIAG INJ IV PUSH: CPT

## 2020-11-09 PROCEDURE — 82310 ASSAY OF CALCIUM: CPT | Performed by: INTERNAL MEDICINE

## 2020-11-09 PROCEDURE — 73630 X-RAY EXAM OF FOOT: CPT | Mod: RT | Performed by: RADIOLOGY

## 2020-11-09 PROCEDURE — 258N000003 HC RX IP 258 OP 636: Performed by: INTERNAL MEDICINE

## 2020-11-09 PROCEDURE — 82565 ASSAY OF CREATININE: CPT | Performed by: INTERNAL MEDICINE

## 2020-11-09 PROCEDURE — 36415 COLL VENOUS BLD VENIPUNCTURE: CPT

## 2020-11-09 PROCEDURE — 99207 PR NO CHARGE LOS: CPT

## 2020-11-09 PROCEDURE — 99213 OFFICE O/P EST LOW 20 MIN: CPT | Performed by: FAMILY MEDICINE

## 2020-11-09 PROCEDURE — 82040 ASSAY OF SERUM ALBUMIN: CPT | Performed by: INTERNAL MEDICINE

## 2020-11-09 PROCEDURE — 250N000011 HC RX IP 250 OP 636: Performed by: INTERNAL MEDICINE

## 2020-11-09 PROCEDURE — 99214 OFFICE O/P EST MOD 30 MIN: CPT | Performed by: INTERNAL MEDICINE

## 2020-11-09 RX ORDER — ZOLEDRONIC ACID 0.04 MG/ML
4 INJECTION, SOLUTION INTRAVENOUS ONCE
Status: COMPLETED | OUTPATIENT
Start: 2020-11-09 | End: 2020-11-09

## 2020-11-09 RX ORDER — ZOLEDRONIC ACID 0.04 MG/ML
4 INJECTION, SOLUTION INTRAVENOUS ONCE
Status: CANCELLED | OUTPATIENT
Start: 2020-11-09

## 2020-11-09 RX ORDER — ZOLEDRONIC ACID 0.04 MG/ML
4 INJECTION, SOLUTION INTRAVENOUS ONCE
Status: CANCELLED | OUTPATIENT
Start: 2021-05-10

## 2020-11-09 RX ORDER — ANASTROZOLE 1 MG/1
1 TABLET ORAL DAILY
Qty: 90 TABLET | Refills: 3 | Status: SHIPPED | OUTPATIENT
Start: 2020-11-09 | End: 2021-10-19

## 2020-11-09 RX ADMIN — ZOLEDRONIC ACID 4 MG: 0.04 INJECTION, SOLUTION INTRAVENOUS at 17:01

## 2020-11-09 RX ADMIN — SODIUM CHLORIDE 250 ML: 9 INJECTION, SOLUTION INTRAVENOUS at 17:01

## 2020-11-09 ASSESSMENT — PAIN SCALES - GENERAL: PAINLEVEL: MODERATE PAIN (4)

## 2020-11-09 NOTE — NURSING NOTE
"Oncology Rooming Note    November 9, 2020 4:10 PM   Nadira Perez is a 70 year old female who presents for:    Chief Complaint   Patient presents with     Blood Draw     Labs drawn from venipuncture by RN in lab. Checked into next appointment. Vitals taken.      Oncology Clinic Visit     Return: Breast CA     Initial Vitals: BP (!) 152/90 (BP Location: Right arm, Patient Position: Sitting, Cuff Size: Adult Regular)   Pulse 96   Temp 98.2  F (36.8  C) (Oral)   Resp 16   SpO2 97%  Estimated body mass index is 27 kg/m  as calculated from the following:    Height as of 9/11/20: 1.702 m (5' 7\").    Weight as of 10/22/20: 78.2 kg (172 lb 6.4 oz). There is no height or weight on file to calculate BSA.  Moderate Pain (4) Comment: Data Unavailable   No LMP recorded. Patient has had a hysterectomy.  Allergies reviewed: Yes  Medications reviewed: Yes    Medications: Medication refills not needed today.  Pharmacy name entered into [employer]:    [facility]  [facility] [id number] [place] [facility] [id number]  [address]    Clinical concerns: N/A      Jess Leigh CMA              "

## 2020-11-09 NOTE — PATIENT INSTRUCTIONS
[facility] Triage and after hours / weekends / holidays:  [phone]    Please call the triage or after hours line if you experience a temperature greater than or equal to 100.4, shaking chills, have uncontrolled nausea, vomiting and/or diarrhea, dizziness, shortness of breath, chest pain, bleeding, unexplained bruising, or if you have any other new/concerning symptoms, questions or concerns.      If you are having any concerning symptoms or wish to speak to a provider before your next infusion visit, please call your care coordinator or triage to notify them so we can adequately serve you.     If you need a refill on a narcotic prescription or other medication, please call before your infusion appointment.                 November 2020 Sunday Monday Tuesday Wednesday Thursday Friday Saturday   1     2     3    [facility] RETURN   2:40 PM   (15 min.)   Abe Wilkerson MD   [facility] Dermatology [facility] 4     5     6     7       8     9    [facility] RETURN WALK IN ORTHO        2:00 PM   (20 min.)   Willis Borjas DO   [facility]opedic [facility] [place]    XR FOOT RIGHT G/E 3 VIEWS   2:05 PM   (20 min.)   [id number]   [facility] Orthopedic Xray [facility] DRAW   3:30 PM   (15 min.)   [facility]   [facility]   3:45 PM   (30 min.)   Khushbu Louise MD   [facility] ONC INFUSION 60   4:30 PM   (60 min.)   [facility] ONCOLOGY INFUSION   [facility] 10     11     12     13     14       15     16     17     18     19     20     21       22     23     24     25     26     27     28       29     30 December 2020 Sunday Monday Tuesday Wednesday Thursday Friday Saturday             1     2     3     4     5       6     7    [facility] RETURN RETINA   1:00 PM   (15 min.)   Marysol Alcocer MD     [facility] 8     9     10     11     12       13     14     15     16     17     18     19       20     21     22     23     24     25     26       27     28     29     30     31                              Recent Results (from the past 24 hour(s))   Calcium    Collection Time: 11/09/20  3:44 PM   Result Value Ref Range    Calcium 9.2 8.5 - 10.1 mg/dL   Albumin level    Collection Time: 11/09/20  3:44 PM   Result Value Ref Range    Albumin 4.1 3.4 - 5.0 g/dL   Creatinine    Collection Time: 11/09/20  3:44 PM   Result Value Ref Range    Creatinine 0.72 0.52 - 1.04 mg/dL    GFR Estimate 86 >60 mL/min/[1.73_m2]    GFR Estimate If Black >90 >60 mL/min/[1.73_m2]

## 2020-11-09 NOTE — PROGRESS NOTES
[facility] ER CANCER CLINIC  ONCOLOGY FOLLOW-UP VISIT NOTE    PATIENT NAME: Nadira Perez    YOB: 1950  MRN :[id number]  DATE OF VISIT: Nov 9, 2020     REASON FOR VISIT : breast cancer follow up.     HISTORY OF PRESENT ILLNESS : The patient is a 69-year-old woman who in 06/2007 had the onset of left-sided breast discomfort that extended into the axilla. She ultimately did undergo mammogram on 06/13/2007, which identified a mass at the 2:30 position in the left breast. Ultrasound also was notable for some skin and areolar complex thickening. She did undergo biopsy of the mass, and this was consistent with an infiltrating ductal carcinoma that was grade 2. The tumor was ER positive, ID positive, and HER2 negative by FISH. She also had a biopsy of left axillary lymph node versus the tail of the axilla, and this was consistent with metastatic carcinoma. Following the diagnosis the patient did have metastatic staging to include a PET CT scan. This demonstrated increased activity in the left breast as well as the lymph nodes, but was otherwise negative. There was also an MRI of the breast performed, and this demonstrated other lesions of the left breast, but no abnormalities on the right.     She initiated neoadjuvant chemotherapy for her inflammatory breast cancer on 07/13/2007. She received  for 3 cycles through 08/24/2007. This was followed by Taxotere for 3 cycles, which was administered 09/14/2007 through 10/26/2007. Following her chemotherapy she did undergo a left-sided mastectomy with axillary lymph node dissection on 11/20/2007 by Dr. Mauro Mei. The patient did have residual carcinoma with a 1.0 cm tumor as well as associated DCIS. Thirteen of 33 lymph nodes were positive, the largest of which was 0.6 cm of tumor infiltration and included extracapsular extension. The patient also had a right-sided prophylactic mastectomy, which did not demonstrate any noninvasive or  "invasive carcinoma. Just prior to her surgery Ismael was placed on Arimidex (start date 11/15/2007). Following her surgery she did have some issues with left chest wall cellulitis as well as some lymphedema of the left upper extremity. She ultimately did go on to receive radiation therapy under the direction of Dr. Nghia Burch. She received radiation to the left chest wall at a dose of 6440 cGy, to the left supraclavicular fossa at a dose of 5040 cGy, and to the left internal mammary chain at a dose of 5080 cGy. Last dose of radiation was administered on 03/07/2008.     INTERVAL HISTORY:  Nadira is here for routine visit.   Ms. Perez completed 10 years of endocrine therapy with Arimidex and then tamoxifen.  In 11/2017, she then went back on Arimidex for a prolonged extended therapy as per the MA17 trial.      She says she is overall tolerating it well.  No issues with hot flashes.  She continues to have intermittent arthralgias worse in her knees.  She has had several recent falls.  No fractures, but several \"sore muscles.\"  She had a foot xray today that was negative.    She has been working with PT.  She is doing balance therapy.      She is still working, but is reducing her hours.     No fevers or chills, no chest pain or shortness of breath, no nausea.       No new medical problems.  She continues to have intermittent neuropathy.      REVIEW OF SYSTEMS:  Her 10-point review of systems is otherwise negative.       PHYSICAL EXAMINATION:   BP (!) 152/90 (BP Location: Right arm, Patient Position: Sitting, Cuff Size: Adult Regular)   Pulse 96   Temp 98.2  F (36.8  C) (Oral)   Resp 16   SpO2 97%   Wt Readings from Last 5 Encounters:   10/22/20 78.2 kg (172 lb 6.4 oz)   09/11/20 79.8 kg (176 lb)   09/03/20 80.7 kg (178 lb)   08/10/20 80.7 kg (178 lb)   07/30/20 80.7 kg (178 lb)     GENERAL : Alert and oriented , not in distress .   HEENT: Normocephalic head.  Anicteric sclerae.No oral ulceration. No nystagmus.  "   NECK: Supple, no thyromegaly.   LYMPH:  No cervical, supraclavicular, axillary, epitrochlear, or inguinal lymphadenopathy.  BREAST: She is status post bilateral mastectomies without reconstruction. No chest wall mass or concerning lesions identified. Multiple chest telangectasias in the left side.  There are no dominant masses on palpation of the chest wall, along the mastectomy scars or into the axilla bilaterally.   LUNGS: Clear breath sounds bilaterally, no wheezing, no crackles.    CARDIOVASCULAR: S1 and S2 well heard, regular rate and rhythm, no murmur or gallop. JVP absent.    ABDOMEN: Soft, nontender, positive bowel sounds. No organomegaly was appreciated.  EXTREMITIES: No cyanosis, no clubbing, no edema.    SKIN: No skin rash, no purpura or petechiae.    NEUROLOGIC: Normal mental status. Alert and oriented .Cranial nerves II through XII grossly intact.  No focal weakness. Sensory examination grossly intact.  Normal coordination( normal finger-to-nose touching) .  Romberg`s sign is negative. She was observed while she was walking and no significant abnormalities noted.         Lab Results   Component Value Date    WBC 7.8 05/12/2020     Lab Results   Component Value Date    RBC 4.28 05/12/2020     Lab Results   Component Value Date    HGB 12.3 05/12/2020     Lab Results   Component Value Date    HCT 39.2 05/12/2020     No components found for: MCT  Lab Results   Component Value Date    MCV 92 05/12/2020     Lab Results   Component Value Date    MCH 28.7 05/12/2020     Lab Results   Component Value Date    MCHC 31.4 05/12/2020     Lab Results   Component Value Date    RDW 14.1 05/12/2020     Lab Results   Component Value Date     05/12/2020       Recent Labs   Lab Test 11/09/20  1544 05/12/20  1118 11/11/19  1509   NA  --  139 140   POTASSIUM  --  3.7 3.1*   CHLORIDE  --  106 105   CO2  --  28 30   ANIONGAP  --  5 5   GLC  --  112* 88   BUN  --  16 18   CR 0.72 0.59 0.66   RAMBO 9.2 8.9 9.2     Liver  Function Studies -   Recent Labs   Lab Test 11/09/20  1544 05/12/20  1118   PROTTOTAL  --  7.5   ALBUMIN 4.1 4.1   BILITOTAL  --  0.7   ALKPHOS  --  75   AST  --  34   ALT  --  26       ASSESSMENT AND PLAN:  1. Inflammatory breast cancer, stage IIIC, the left breast, ER positive, HER2 negative, status post neoadjuvant chemotherapy followed by bilateral mastectomies. Chest wall radiotherapy completed in 03/2008. She was on adjuvant Arimidex from November 2007 until November 2012, and then was switched to tamoxifen. She completed 10 years of adjuvant hormonal therapy in November 2017.  At that time, she was switched back to arimidex per MA 17 trial.  She is tolerating this well.    We reviewed the pros and cons of staying on an AI.  Based on her advanced disease, I recommended she remain on the arimidex for one more year.  She is in agreement.    2. Gait imbalance : Unchanged.  Normal brain MRI in November 2017.  Continue pilates and PT.    3. Osteoporosis. Bone density scan improved.  She is on zometa yearly.    4. Vaginal dryness - on replens.        Khushbu Louise MD

## 2020-11-09 NOTE — PROGRESS NOTES
SPORTS & ORTHOPEDIC WALK-IN VISIT 11/9/2020    Primary Care Physician: Matilde Solorzano foot- tripped and hyperextended her toes 2-4th toes. Concerned for a fracture       Reason for visit:     What part of your body is injured / painful?  right foot      What caused the injury /pain? Fall    How long ago did your injury occur or pain begin? 10/31/20 and 11/8/20    What are your most bothersome symptoms? Pain and Swelling    How would you characterize your symptom?  aching and dull    What makes your symptoms better? Other: meds     What makes your symptoms worse? Walking and Bending    Have you been previously seen for this problem? No    Medical History:    Any recent changes to your medical history? No    Any new medication prescribed since last visit? No    Have you had surgery on this body part before? No      Review of Systems:    Do you have fever, chills, weight loss? No    Do you have any vision problems? No    Do you have any chest pain or edema? No    Do you have any shortness of breath or wheezing?  No    Do you have stomach problems? No    Do you have any numbness or focal weakness? No    Do you have diabetes? No    Do you have problems with bleeding or clotting? No    Do you have an rashes or other skin lesions? No

## 2020-11-09 NOTE — NURSING NOTE
Chief Complaint   Patient presents with     Blood Draw     Labs drawn from venipuncture by RN in lab. Checked into next appointment. Vitals taken.      Labs drawn by venipuncture by RN in lab.  Vital signs taken.  Pt checked in to next appointment.    Caitlyn Hand RN

## 2020-11-09 NOTE — LETTER
11/9/2020         RE: Nadira Perez  [id number]  [facility] [address]        Dear Colleague,    Thank you for referring your patient, Nadira Perez, to the [facility]. Please see a copy of my visit note below.    [facility]  ONCOLOGY FOLLOW-UP VISIT NOTE    PATIENT NAME: Nadira Perez    YOB: 1950  MRN :[id number]  DATE OF VISIT: Nov 9, 2020     REASON FOR VISIT : breast cancer follow up.     HISTORY OF PRESENT ILLNESS : The patient is a 69-year-old woman who in 06/2007 had the onset of left-sided breast discomfort that extended into the axilla. She ultimately did undergo mammogram on 06/13/2007, which identified a mass at the 2:30 position in the left breast. Ultrasound also was notable for some skin and areolar complex thickening. She did undergo biopsy of the mass, and this was consistent with an infiltrating ductal carcinoma that was grade 2. The tumor was ER positive, DE positive, and HER2 negative by FISH. She also had a biopsy of left axillary lymph node versus the tail of the axilla, and this was consistent with metastatic carcinoma. Following the diagnosis the patient did have metastatic staging to include a PET CT scan. This demonstrated increased activity in the left breast as well as the lymph nodes, but was otherwise negative. There was also an MRI of the breast performed, and this demonstrated other lesions of the left breast, but no abnormalities on the right.     She initiated neoadjuvant chemotherapy for her inflammatory breast cancer on 07/13/2007. She received  for 3 cycles through 08/24/2007. This was followed by Taxotere for 3 cycles, which was administered 09/14/2007 through 10/26/2007. Following her chemotherapy she did undergo a left-sided mastectomy with axillary lymph node dissection on 11/20/2007 by Dr. Mauro Mei. The patient did have residual carcinoma with a 1.0 cm tumor  "as well as associated DCIS. Thirteen of 33 lymph nodes were positive, the largest of which was 0.6 cm of tumor infiltration and included extracapsular extension. The patient also had a right-sided prophylactic mastectomy, which did not demonstrate any noninvasive or invasive carcinoma. Just prior to her surgery Ismael was placed on Arimidex (start date 11/15/2007). Following her surgery she did have some issues with left chest wall cellulitis as well as some lymphedema of the left upper extremity. She ultimately did go on to receive radiation therapy under the direction of Dr. Nghia Burch. She received radiation to the left chest wall at a dose of 6440 cGy, to the left supraclavicular fossa at a dose of 5040 cGy, and to the left internal mammary chain at a dose of 5080 cGy. Last dose of radiation was administered on 03/07/2008.     INTERVAL HISTORY:  Nadira is here for routine visit.   Ms. Perez completed 10 years of endocrine therapy with Arimidex and then tamoxifen.  In 11/2017, she then went back on Arimidex for a prolonged extended therapy as per the MA17 trial.      She says she is overall tolerating it well.  No issues with hot flashes.  She continues to have intermittent arthralgias worse in her knees.  She has had several recent falls.  No fractures, but several \"sore muscles.\"  She had a foot xray today that was negative.    She has been working with PT.  She is doing balance therapy.      She is still working, but is reducing her hours.     No fevers or chills, no chest pain or shortness of breath, no nausea.       No new medical problems.  She continues to have intermittent neuropathy.      REVIEW OF SYSTEMS:  Her 10-point review of systems is otherwise negative.       PHYSICAL EXAMINATION:   BP (!) 152/90 (BP Location: Right arm, Patient Position: Sitting, Cuff Size: Adult Regular)   Pulse 96   Temp 98.2  F (36.8  C) (Oral)   Resp 16   SpO2 97%   Wt Readings from Last 5 Encounters:   10/22/20 78.2 " kg (172 lb 6.4 oz)   09/11/20 79.8 kg (176 lb)   09/03/20 80.7 kg (178 lb)   08/10/20 80.7 kg (178 lb)   07/30/20 80.7 kg (178 lb)     GENERAL : Alert and oriented , not in distress .   HEENT: Normocephalic head.  Anicteric sclerae.No oral ulceration. No nystagmus.    NECK: Supple, no thyromegaly.   LYMPH:  No cervical, supraclavicular, axillary, epitrochlear, or inguinal lymphadenopathy.  BREAST: She is status post bilateral mastectomies without reconstruction. No chest wall mass or concerning lesions identified. Multiple chest telangectasias in the left side.  There are no dominant masses on palpation of the chest wall, along the mastectomy scars or into the axilla bilaterally.   LUNGS: Clear breath sounds bilaterally, no wheezing, no crackles.    CARDIOVASCULAR: S1 and S2 well heard, regular rate and rhythm, no murmur or gallop. JVP absent.    ABDOMEN: Soft, nontender, positive bowel sounds. No organomegaly was appreciated.  EXTREMITIES: No cyanosis, no clubbing, no edema.    SKIN: No skin rash, no purpura or petechiae.    NEUROLOGIC: Normal mental status. Alert and oriented .Cranial nerves II through XII grossly intact.  No focal weakness. Sensory examination grossly intact.  Normal coordination( normal finger-to-nose touching) .  Romberg`s sign is negative. She was observed while she was walking and no significant abnormalities noted.         Lab Results   Component Value Date    WBC 7.8 05/12/2020     Lab Results   Component Value Date    RBC 4.28 05/12/2020     Lab Results   Component Value Date    HGB 12.3 05/12/2020     Lab Results   Component Value Date    HCT 39.2 05/12/2020     No components found for: MCT  Lab Results   Component Value Date    MCV 92 05/12/2020     Lab Results   Component Value Date    MCH 28.7 05/12/2020     Lab Results   Component Value Date    MCHC 31.4 05/12/2020     Lab Results   Component Value Date    RDW 14.1 05/12/2020     Lab Results   Component Value Date      05/12/2020       Recent Labs   Lab Test 11/09/20  1544 05/12/20  1118 11/11/19  1509   NA  --  139 140   POTASSIUM  --  3.7 3.1*   CHLORIDE  --  106 105   CO2  --  28 30   ANIONGAP  --  5 5   GLC  --  112* 88   BUN  --  16 18   CR 0.72 0.59 0.66   RAMBO 9.2 8.9 9.2     Liver Function Studies -   Recent Labs   Lab Test 11/09/20  1544 05/12/20  1118   PROTTOTAL  --  7.5   ALBUMIN 4.1 4.1   BILITOTAL  --  0.7   ALKPHOS  --  75   AST  --  34   ALT  --  26       ASSESSMENT AND PLAN:  1. Inflammatory breast cancer, stage IIIC, the left breast, ER positive, HER2 negative, status post neoadjuvant chemotherapy followed by bilateral mastectomies. Chest wall radiotherapy completed in 03/2008. She was on adjuvant Arimidex from November 2007 until November 2012, and then was switched to tamoxifen. She completed 10 years of adjuvant hormonal therapy in November 2017.  At that time, she was switched back to arimidex per MA 17 trial.  She is tolerating this well.    We reviewed the pros and cons of staying on an AI.  Based on her advanced disease, I recommended she remain on the arimidex for one more year.  She is in agreement.    2. Gait imbalance : Unchanged.  Normal brain MRI in November 2017.  Continue pilates and PT.    3. Osteoporosis. Bone density scan improved.  She is on zometa yearly.    4. Vaginal dryness - on replens.        Khushbu Louise MD

## 2020-11-09 NOTE — PROGRESS NOTES
Infusion Nursing Note:  Nadira Perez presents today for Zometa.    Patient seen by provider today: Yes: Dr. Louise.   present during visit today: Not Applicable.    Note: Pt confirms she is taking Calcium and Vitamin D.     Intravenous Access:  Peripheral IV placed.    Treatment Conditions:    Lab Results   Component Value Date    CR 0.72 11/09/2020                   Lab Results   Component Value Date    RAMBO 9.2 11/09/2020       Lab Results   Component Value Date    ALBUMIN 4.1 11/09/2020     Results reviewed, labs MET treatment parameters, ok to proceed with treatment.    Post Infusion Assessment:  Patient tolerated infusion without incident.  Blood return noted pre and post infusion.  Site patent and intact, free from redness, edema or discomfort.  No evidence of extravasations.  Access discontinued per protocol.     Discharge Plan:   Patient declined prescription refills.  Copy of AVS reviewed with patient and/or family.  Patient will return in 6 months for next appointment.  Patient discharged in stable condition accompanied by: self.  Departure Mode: Ambulatory.  Face to Face time: 2.    Ruthy Quezada RN

## 2020-11-09 NOTE — LETTER
11/9/2020         RE: Nadira Perez  [address]  [facility] [address]        Dear Colleague,    Thank you for referring your patient, Nadira Perez, to the [facility] [place]. Please see a copy of my visit note below.          SPORTS & ORTHOPEDIC WALK-IN VISIT 11/9/2020    Primary Care Physician: Matilde Solorzano foot- tripped and hyperextended her toes 2-4th toes. Concerned for a fracture       Reason for visit:     What part of your body is injured / painful?  right foot      What caused the injury /pain? Fall    How long ago did your injury occur or pain begin? 10/31/20 and 11/8/20    What are your most bothersome symptoms? Pain and Swelling    How would you characterize your symptom?  aching and dull    What makes your symptoms better? Other: meds     What makes your symptoms worse? Walking and Bending    Have you been previously seen for this problem? No    Medical History:    Any recent changes to your medical history? No    Any new medication prescribed since last visit? No    Have you had surgery on this body part before? No      Review of Systems:    Do you have fever, chills, weight loss? No    Do you have any vision problems? No    Do you have any chest pain or edema? No    Do you have any shortness of breath or wheezing?  No    Do you have stomach problems? No    Do you have any numbness or focal weakness? No    Do you have diabetes? No    Do you have problems with bleeding or clotting? No    Do you have an rashes or other skin lesions? No           CHIEF COMPLAINT:  Pain of the Right Foot       HISTORY OF PRESENT ILLNESS  Ms. Perez is a pleasant 70 year old year old female who presents to clinic today with acute pain of right foot.  Nadira explains that she suffered a mechanical fall on 11/8/20 injuring her toes.  Notes pain of right foot after this fall, namely second third and fourth toes.  Swelling and bruising of these toes.   Pain is aching and dull.  Worse with walking and bending.  Improved with rest. Treatment has included acetaminophen.     Additional history: Ambulates with rolling walker at this time.    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     Corneal opacity     Hypothyroidism     Osteopenia, unspecified location     Aromatase inhibitor use     Chronic pain of right knee     DDD (degenerative disc disease), lumbar     Degenerative scoliosis in adult patient     Lumbar radiculopathy       Current Outpatient Medications   Medication Sig Dispense Refill     Acetaminophen (APAP 500 PO) Take 1,000 mg by mouth 3 times daily as needed        amLODIPine (NORVASC) 10 MG tablet Take 1 tablet (10 mg) by mouth daily 90 tablet 3     anastrozole (ARIMIDEX) 1 MG tablet Take 1 tablet (1 mg) by mouth daily 90 tablet 3     Ascorbic Acid (VITAMIN C) 500 MG CHEW Take 1 tablet by mouth daily       atorvastatin (LIPITOR) 40 MG tablet Take 1 tablet (40 mg) by mouth daily 90 tablet 2     cholecalciferol 2000 units CAPS Take 6,000 Units by mouth daily 270 capsule 3     Coenzyme Q10 (COQ10) 200 MG CAPS Take 1 capsule by mouth daily       Cranberry 600 MG TABS Take 650 mg by mouth daily       cyclobenzaprine (FLEXERIL) 10 MG tablet Take 1 tablet (10 mg) by mouth 3 times daily as needed for muscle spasms 40 tablet 3     gabapentin (NEURONTIN) 300 MG capsule Take 1-2 capsules by mouth every 8 hours 540 capsule 3      glucosamine-chondroitin 500-400 MG CAPS per capsule Take 1 capsule by mouth 2 times daily        HYDROcodone-acetaminophen (NORCO) 5-325 MG tablet Take 1 tablet by mouth every 6 hours as needed for severe pain 40 tablet 0     ibuprofen (ADVIL/MOTRIN) 600 MG tablet Take 800 mg by mouth every 8 hours as needed for moderate pain        levothyroxine (SYNTHROID/LEVOTHROID) 112 MCG tablet TAKE 1/2 TABLET BY MOUTH DAILY 45 tablet 3     lisinopril (ZESTRIL) 40 MG tablet Take 1 tablet (40 mg) by mouth daily 90 tablet 3     LORazepam (ATIVAN) 2 MG tablet Take 1 tablet at night for sleep 30 tablet 1     Magnesium Oxide 250 MG TABS Take 1 tablet by mouth daily       melatonin 5 MG tablet Take 1 tablet by mouth daily       metoprolol succinate ER (TOPROL-XL) 50 MG 24 hr tablet Take 1 tablet (50 mg) by mouth daily 90 tablet 3     Multiple Vitamin (MULTI-VITAMIN) per tablet Take 1 tablet by mouth daily.       Omega-3 Fatty Acids (OMEGA-3 FISH OIL PO) Take 1 capsule by mouth daily        ondansetron (ZOFRAN ODT) 4 MG ODT tab Take 1 tablet (4 mg) by mouth every 8 hours as needed for nausea 20 tablet 3     ondansetron (ZOFRAN-ODT) 4 MG ODT tab Take 1 tablet (4 mg) by mouth every 8 hours as needed for nausea 40 tablet 3     potassium chloride ER (KLOR-CON M) 20 MEQ CR tablet Take 20 meq  Three times a week. 36 tablet 3     predniSONE (DELTASONE) 20 MG tablet Take 2 tablets (40 mg) by mouth daily 10 tablet 0     terbinafine (LAMISIL AT) 1 % external cream Apply topically 2 times daily For fungal infection not resolved with other antifungals (e.g. Clotrimazole) 24 g 11     tiZANidine (ZANAFLEX) 2 MG tablet Take 1-2 tablets (2-4 mg) by mouth 3 times daily as needed for muscle spasms 30 tablet 1     triamcinolone (KENALOG) 0.1 % external ointment Apply topically 2 times daily 30 g 11     triamterene-HCTZ (MAXZIDE-25) 37.5-25 MG tablet Take 1 tablet by mouth daily 90 tablet 3     Vaginal Lubricant (REPHRESH) GEL Place 2 g vaginally every  3 days 14 Box 3     VOLTAREN 1 % topical gel Apply 4 grams to knees or 2 grams to hands four times daily using enclosed dosing card. 100 g 1     zinc gluconate 50 MG tablet Take 50 mg by mouth 2 times daily         Allergies   Allergen Reactions     Penicillins Hives       Family History   Problem Relation Age of Onset     Heart Disease Father         AAA     Hypertension Father      Neurologic Disorder Mother         Anuerysm of Cerebral Artery, Dementia     Diabetes Mother      Thyroid Disease Mother         ,     Cerebrovascular Disease Mother      Dementia Mother      Osteoporosis Mother      Diabetes Maternal Grandmother      Asthma Maternal Grandmother      Diabetes Maternal Aunt         x2     Melanoma Maternal Aunt      Glaucoma Maternal Aunt      Circulatory Brother         Perihperal Neurophathy     Dementia Other      Cancer Other         malignant melanoma     Hypertension Other      Hypertension Other      Cerebrovascular Disease Other      Cerebrovascular Disease Other      Obesity Other      Respiratory Other      Chronic Obstructive Pulmonary Disease Maternal Grandfather         father     Asthma Maternal Grandfather      Breast Cancer Cousin      Cancer Other      Diabetes Other      Asthma Other      Macular Degeneration No family hx of      Coronary Artery Disease No family hx of      Hyperlipidemia No family hx of      Kidney Disease No family hx of      Thrombosis No family hx of      Arthritis No family hx of      Depression No family hx of      Mental Illness No family hx of      Substance Abuse No family hx of      Cystic Fibrosis No family hx of      Early Death No family hx of      Coronary Artery Disease Early Onset No family hx of      Heart Failure No family hx of      Bleeding Diathesis No family hx of      Ovarian Cancer No family hx of      Uterine Cancer No family hx of      Prostate Cancer No family hx of      Colorectal Cancer No family hx of      Pancreatic Cancer No family hx of       Lung Cancer No family hx of      Other Cancer No family hx of      Autoimmune Disease No family hx of      Unknown/Adopted No family hx of      Genetic Disorder No family hx of        Additional medical/Social/Surgical histories reviewed in [facility] and updated as appropriate.     REVIEW OF SYSTEMS (11/10/2020)  A 10-point review of systems was obtained and is negative except for as noted in the HPI.      PHYSICAL EXAM  There were no vitals taken for this visit.    General  - normal appearance, in no obvious distress  HEENT  - conjunctivae not injected, moist mucous membranes  CV  - normal pulses at posterior tib and dorsalis pedis  Pulm  - normal respiratory pattern, non-labored  Musculoskeletal - right foot  - stance: normal gait without limp, normal stance without excessive pronation, normal heel inversion with standing heel raise, no obvious leg length discrepancy, normal heel and toe walk  - inspection:Mild swelling,  normal bone and joint alignment, no obvious deformity  - palpation: Tenderness at second, third and fourth toes of right foot.  - ROM: normal active and passive ROM of great and lesser toes, painful flexion of toes 2-3.  - strength: 5/5 in all planes  Neuro  - no sensory or motor deficit, grossly normal coordination, normal muscle tone  Skin  - Ecchymosis of toes 2-4.    Psych  - interactive, appropriate, normal mood and affect    IMAGING :XR right foot 3V. Final results and radiologist's interpretation, available in the [facility] health record. Images were reviewed with the patient/family members in the office today. My personal interpretation of the performed imaging is no acute osseous abnormality of toes.     ASSESSMENT & PLAN  Ms. Perez is a 70 year old year old female who presents to clinic today with acute pain of right foot after mechanical fall on 11/8/20.     Diagnosis: Contusion of toes of right foot    -Post-op shoe offered for comfort and declined  -Continue dorian taping,  demonstrated  -Firm soled footwear  -Continue ice, otc analgesics  -Follow up 4 weeks PRN if persisting    It was a pleasure seeing Nadira today.    Willis Borjas DO, CAQSM  Primary Care Sports Medicine

## 2020-11-10 NOTE — PROGRESS NOTES
CHIEF COMPLAINT:  Pain of the Right Foot       HISTORY OF PRESENT ILLNESS  Ms. Perez is a pleasant 70 year old year old female who presents to clinic today with acute pain of right foot.  Nadira explains that she suffered a mechanical fall on 11/8/20 injuring her toes.  Notes pain of right foot after this fall, namely second third and fourth toes.  Swelling and bruising of these toes.  Pain is aching and dull.  Worse with walking and bending.  Improved with rest. Treatment has included acetaminophen.     Additional history: Ambulates with rolling walker at this time.    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     Corneal opacity     Hypothyroidism     Osteopenia, unspecified location     Aromatase inhibitor use     Chronic pain of right knee     DDD (degenerative disc disease), lumbar     Degenerative scoliosis in adult patient     Lumbar radiculopathy       Current Outpatient Medications   Medication Sig Dispense Refill     Acetaminophen (APAP 500 PO) Take 1,000 mg by mouth 3 times daily as needed        amLODIPine (NORVASC) 10 MG tablet Take 1 tablet (10 mg) by mouth daily 90 tablet 3     anastrozole (ARIMIDEX) 1 MG tablet Take 1 tablet (1 mg) by mouth daily 90 tablet 3     Ascorbic Acid (VITAMIN C) 500 MG CHEW Take 1 tablet by mouth daily       atorvastatin (LIPITOR) 40 MG tablet Take 1 tablet (40 mg) by mouth daily 90 tablet 2     cholecalciferol 2000 units CAPS Take 6,000 Units by mouth  daily 270 capsule 3     Coenzyme Q10 (COQ10) 200 MG CAPS Take 1 capsule by mouth daily       Cranberry 600 MG TABS Take 650 mg by mouth daily       cyclobenzaprine (FLEXERIL) 10 MG tablet Take 1 tablet (10 mg) by mouth 3 times daily as needed for muscle spasms 40 tablet 3     gabapentin (NEURONTIN) 300 MG capsule Take 1-2 capsules by mouth every 8 hours 540 capsule 3     glucosamine-chondroitin 500-400 MG CAPS per capsule Take 1 capsule by mouth 2 times daily        HYDROcodone-acetaminophen (NORCO) 5-325 MG tablet Take 1 tablet by mouth every 6 hours as needed for severe pain 40 tablet 0     ibuprofen (ADVIL/MOTRIN) 600 MG tablet Take 800 mg by mouth every 8 hours as needed for moderate pain        levothyroxine (SYNTHROID/LEVOTHROID) 112 MCG tablet TAKE 1/2 TABLET BY MOUTH DAILY 45 tablet 3     lisinopril (ZESTRIL) 40 MG tablet Take 1 tablet (40 mg) by mouth daily 90 tablet 3     LORazepam (ATIVAN) 2 MG tablet Take 1 tablet at night for sleep 30 tablet 1     Magnesium Oxide 250 MG TABS Take 1 tablet by mouth daily       melatonin 5 MG tablet Take 1 tablet by mouth daily       metoprolol succinate ER (TOPROL-XL) 50 MG 24 hr tablet Take 1 tablet (50 mg) by mouth daily 90 tablet 3     Multiple Vitamin (MULTI-VITAMIN) per tablet Take 1 tablet by mouth daily.       Omega-3 Fatty Acids (OMEGA-3 FISH OIL PO) Take 1 capsule by mouth daily        ondansetron (ZOFRAN ODT) 4 MG ODT tab Take 1 tablet (4 mg) by mouth every 8 hours as needed for nausea 20 tablet 3     ondansetron (ZOFRAN-ODT) 4 MG ODT tab Take 1 tablet (4 mg) by mouth every 8 hours as needed for nausea 40 tablet 3     potassium chloride ER (KLOR-CON M) 20 MEQ CR tablet Take 20 meq  Three times a week. 36 tablet 3     predniSONE (DELTASONE) 20 MG tablet Take 2 tablets (40 mg) by mouth daily 10 tablet 0     terbinafine (LAMISIL AT) 1 % external cream Apply topically 2 times daily For fungal infection not resolved with other antifungals (e.g. Clotrimazole) 24 g 11      tiZANidine (ZANAFLEX) 2 MG tablet Take 1-2 tablets (2-4 mg) by mouth 3 times daily as needed for muscle spasms 30 tablet 1     triamcinolone (KENALOG) 0.1 % external ointment Apply topically 2 times daily 30 g 11     triamterene-HCTZ (MAXZIDE-25) 37.5-25 MG tablet Take 1 tablet by mouth daily 90 tablet 3     Vaginal Lubricant (REPHRESH) GEL Place 2 g vaginally every 3 days 14 Box 3     VOLTAREN 1 % topical gel Apply 4 grams to knees or 2 grams to hands four times daily using enclosed dosing card. 100 g 1     zinc gluconate 50 MG tablet Take 50 mg by mouth 2 times daily         Allergies   Allergen Reactions     Penicillins Hives       Family History   Problem Relation Age of Onset     Heart Disease Father         AAA     Hypertension Father      Neurologic Disorder Mother         Anuerysm of Cerebral Artery, Dementia     Diabetes Mother      Thyroid Disease Mother         ,     Cerebrovascular Disease Mother      Dementia Mother      Osteoporosis Mother      Diabetes Maternal Grandmother      Asthma Maternal Grandmother      Diabetes Maternal Aunt         x2     Melanoma Maternal Aunt      Glaucoma Maternal Aunt      Circulatory Brother         Perihperal Neurophathy     Dementia Other      Cancer Other         malignant melanoma     Hypertension Other      Hypertension Other      Cerebrovascular Disease Other      Cerebrovascular Disease Other      Obesity Other      Respiratory Other      Chronic Obstructive Pulmonary Disease Maternal Grandfather         father     Asthma Maternal Grandfather      Breast Cancer Cousin      Cancer Other      Diabetes Other      Asthma Other      Macular Degeneration No family hx of      Coronary Artery Disease No family hx of      Hyperlipidemia No family hx of      Kidney Disease No family hx of      Thrombosis No family hx of      Arthritis No family hx of      Depression No family hx of      Mental Illness No family hx of      Substance Abuse No family hx of      Cystic  Fibrosis No family hx of      Early Death No family hx of      Coronary Artery Disease Early Onset No family hx of      Heart Failure No family hx of      Bleeding Diathesis No family hx of      Ovarian Cancer No family hx of      Uterine Cancer No family hx of      Prostate Cancer No family hx of      Colorectal Cancer No family hx of      Pancreatic Cancer No family hx of      Lung Cancer No family hx of      Other Cancer No family hx of      Autoimmune Disease No family hx of      Unknown/Adopted No family hx of      Genetic Disorder No family hx of        Additional medical/Social/Surgical histories reviewed in [facility] and updated as appropriate.     REVIEW OF SYSTEMS (11/10/2020)  A 10-point review of systems was obtained and is negative except for as noted in the HPI.      PHYSICAL EXAM  There were no vitals taken for this visit.    General  - normal appearance, in no obvious distress  HEENT  - conjunctivae not injected, moist mucous membranes  CV  - normal pulses at posterior tib and dorsalis pedis  Pulm  - normal respiratory pattern, non-labored  Musculoskeletal - right foot  - stance: normal gait without limp, normal stance without excessive pronation, normal heel inversion with standing heel raise, no obvious leg length discrepancy, normal heel and toe walk  - inspection:Mild swelling,  normal bone and joint alignment, no obvious deformity  - palpation: Tenderness at second, third and fourth toes of right foot.  - ROM: normal active and passive ROM of great and lesser toes, painful flexion of toes 2-3.  - strength: 5/5 in all planes  Neuro  - no sensory or motor deficit, grossly normal coordination, normal muscle tone  Skin  - Ecchymosis of toes 2-4.    Psych  - interactive, appropriate, normal mood and affect    IMAGING :XR right foot 3V. Final results and radiologist's interpretation, available in the [facility] health record. Images were reviewed with the patient/family members in the office today. My personal  interpretation of the performed imaging is no acute osseous abnormality of toes.     ASSESSMENT & PLAN  Ms. Perez is a 70 year old year old female who presents to clinic today with acute pain of right foot after mechanical fall on 11/8/20.     Diagnosis: Contusion of toes of right foot    -Post-op shoe offered for comfort and declined  -Continue dorian taping, demonstrated  -Firm soled footwear  -Continue ice, otc analgesics  -Follow up 4 weeks PRN if persisting    It was a pleasure seeing Nadira today.    Willis Borjas DO, CAQSM  Primary Care Sports Medicine

## 2020-11-29 ENCOUNTER — ANCILLARY PROCEDURE (OUTPATIENT)
Dept: GENERAL RADIOLOGY | Facility: CLINIC | Age: 70
End: 2020-11-29
Attending: FAMILY MEDICINE
Payer: COMMERCIAL

## 2020-11-29 ENCOUNTER — OFFICE VISIT (OUTPATIENT)
Dept: ORTHOPEDICS | Facility: CLINIC | Age: 70
End: 2020-11-29
Payer: COMMERCIAL

## 2020-11-29 DIAGNOSIS — M79.671 RIGHT FOOT PAIN: ICD-10-CM

## 2020-11-29 DIAGNOSIS — M79.671 RIGHT FOOT PAIN: Primary | ICD-10-CM

## 2020-11-29 PROCEDURE — 99214 OFFICE O/P EST MOD 30 MIN: CPT | Performed by: FAMILY MEDICINE

## 2020-11-29 PROCEDURE — 73630 X-RAY EXAM OF FOOT: CPT | Mod: RT | Performed by: RADIOLOGY

## 2020-11-29 NOTE — PROGRESS NOTES
SPORTS & ORTHOPEDIC WALK-IN VISIT 11/29/2020    Primary Care Physician: Dr. Quiñonez    Here today for right foot pain.  11/18/20 - dropped a can of potatoes on her R dorsal midfoot.  Has prior history of fracture in the metatarsals and toes as a child.  Pain is overall improving but still has some pain with walking and direct pressure in the area.    Has iced and used ibuprofen with decent relief.      Reason for visit:     What part of your body is injured / painful?  right foot    What caused the injury /pain? Dropped on foot    How long ago did your injury occur or pain begin? several weeks ago    What are your most bothersome symptoms? Pain    How would you characterize your symptom?  aching    What makes your symptoms better? Rest, Ice and Ibuprofen    What makes your symptoms worse? Walking and Movement    Have you been previously seen for this problem? No    Medical History:    Any recent changes to your medical history? No    Any new medication prescribed since last visit? No    Have you had surgery on this body part before? No    Review of Systems:    Do you have fever, chills, weight loss? No    Do you have any vision problems? No    Do you have any chest pain or edema? No    Do you have any shortness of breath or wheezing?  No    Do you have stomach problems? No    Do you have any numbness or focal weakness? No    Do you have diabetes? No    Do you have problems with bleeding or clotting? No    Do you have an rashes or other skin lesions? No         Past Medical History, Current Medications, and Allergies are reviewed in the electronic medical record as appropriate.       EXAM:There were no vitals taken for this visit.    General: Alert, pleasant, no distress  Right foot: warm, well perfused, SILT throughout     Inspection: Small healing scab over the base of the second metatarsal dorsally.  Diffuse soft tissue swelling over the dorsal foot.  Resolving ecchymosis in the third toe from previous  injury.     ROM: Symmetric     Strength: Intact     Palpation: Tender to palpation over the base of the first second and third metatarsals and the dorsal midfoot area.  No TTP throughout the remainder of the midfoot.     Special Tests: None        Imaging: Three-view x-rays of the right foot are performed and reviewed independently demonstrating no acute fracture dislocation.  Similarities in the metatarsals are seen that are likely secondary to previous injuries.  See EMR for formal radiology report.      Assessment: Patient is a 70 year old female with right foot contusion    Recommendations:   Reviewed imaging and assessment with the patient in detail  Continue conservative management with relative rest, icing, elevation.  OTC medication for pain as needed.  Follow-up in 4 weeks if still having persistent pain    Bob Arredondo MD

## 2020-12-04 ENCOUNTER — THERAPY VISIT (OUTPATIENT)
Dept: PHYSICAL THERAPY | Facility: CLINIC | Age: 70
End: 2020-12-04
Payer: COMMERCIAL

## 2020-12-04 DIAGNOSIS — M54.16 LUMBAR RADICULOPATHY: ICD-10-CM

## 2020-12-04 PROCEDURE — 97530 THERAPEUTIC ACTIVITIES: CPT | Mod: GP | Performed by: PHYSICAL THERAPIST

## 2020-12-04 PROCEDURE — 97110 THERAPEUTIC EXERCISES: CPT | Mod: GP | Performed by: PHYSICAL THERAPIST

## 2020-12-07 ENCOUNTER — OFFICE VISIT (OUTPATIENT)
Dept: OPHTHALMOLOGY | Facility: CLINIC | Age: 70
End: 2020-12-07
Attending: OPHTHALMOLOGY
Payer: COMMERCIAL

## 2020-12-07 DIAGNOSIS — H43.812 PVD (POSTERIOR VITREOUS DETACHMENT), LEFT EYE: Primary | ICD-10-CM

## 2020-12-07 PROCEDURE — 99213 OFFICE O/P EST LOW 20 MIN: CPT | Performed by: OPHTHALMOLOGY

## 2020-12-07 PROCEDURE — G0463 HOSPITAL OUTPT CLINIC VISIT: HCPCS

## 2020-12-07 ASSESSMENT — SLIT LAMP EXAM - LIDS
COMMENTS: NORMAL
COMMENTS: NORMAL

## 2020-12-07 ASSESSMENT — TONOMETRY
IOP_METHOD: ICARE
OS_IOP_MMHG: 16
OD_IOP_MMHG: 16

## 2020-12-07 ASSESSMENT — CONF VISUAL FIELD
OS_NORMAL: 1
OD_NORMAL: 1

## 2020-12-07 ASSESSMENT — REFRACTION_WEARINGRX
OD_ADD: +2.75
OD_SPHERE: -0.50
OD_AXIS: 170
OS_SPHERE: PLANO
OS_ADD: +2.75
OD_CYLINDER: +1.25
OS_AXIS: 050
OS_CYLINDER: +1.00

## 2020-12-07 ASSESSMENT — VISUAL ACUITY
OS_CC: 20/25
OS_SC: 20/30
OD_CC: 20/25
CORRECTION_TYPE: GLASSES
OD_SC: 20/25
METHOD: SNELLEN - LINEAR

## 2020-12-07 ASSESSMENT — CUP TO DISC RATIO
OD_RATIO: 0.4
OS_RATIO: 0.45

## 2020-12-07 ASSESSMENT — EXTERNAL EXAM - LEFT EYE: OS_EXAM: NORMAL

## 2020-12-07 ASSESSMENT — EXTERNAL EXAM - RIGHT EYE: OD_EXAM: NORMAL

## 2020-12-07 NOTE — NURSING NOTE
Chief Complaints and History of Present Illnesses   Patient presents with     Blurred Vision Follow-Up     Chief Complaint(s) and History of Present Illness(es)     Blurred Vision Follow-Up               Comments      1.  Posterior vitreous detachment (PVD) with sub hyaloid hemorrhage left eye    Film in the left eye is still there.     Brodie CRAWFORD 1:38 PM December 7, 2020

## 2020-12-07 NOTE — PROGRESS NOTES
I have confirmed the patient's and reviewed Past Medical History, Past Surgical History, Social History, Family History, Problem List, Medication List and agree with Tech note.    CC: blurry vision left eye     Interval History: Vision has improved since last visit.  She has still some opaque floating film in the left eye.  No new flashes.    HPI: Patient fell 9/24/2020.  Blurry vision left eye starting around 10/2/2020.  No pain, floaters     Assessment/plan:   1.  Posterior vitreous detachment (PVD) with sub hyaloid hemorrhage OS   - Fell 9/24/2020   - Acute blurry vision 10/2/2020   - doing much better, B-scan much improved   - no RD or tear noted on B-scan   - Still no tear on examination   - S/Sx of RD discussed for immediate return   - Recheck in 6 weeks; if no tear, resume normal eye care      RTC refraction and dilated fundus exam 6 weeks       Marysol Salmeron MD PhD.  Professor & Chair

## 2020-12-09 ENCOUNTER — THERAPY VISIT (OUTPATIENT)
Dept: PHYSICAL THERAPY | Facility: CLINIC | Age: 70
End: 2020-12-09
Payer: COMMERCIAL

## 2020-12-09 DIAGNOSIS — M54.16 LUMBAR RADICULOPATHY: ICD-10-CM

## 2020-12-09 PROCEDURE — 97112 NEUROMUSCULAR REEDUCATION: CPT | Mod: GP | Performed by: PHYSICAL THERAPIST

## 2020-12-09 PROCEDURE — 97110 THERAPEUTIC EXERCISES: CPT | Mod: GP | Performed by: PHYSICAL THERAPIST

## 2020-12-16 ENCOUNTER — HOSPITAL ENCOUNTER (OUTPATIENT)
Dept: LAB | Facility: CLINIC | Age: 70
Discharge: HOME | End: 2020-12-16
Attending: NURSE PRACTITIONER | Admitting: NURSE PRACTITIONER
Payer: COMMERCIAL

## 2020-12-16 DIAGNOSIS — R73.09 ELEVATED HEMOGLOBIN A1C: ICD-10-CM

## 2020-12-16 DIAGNOSIS — E78.00 ELEVATED LDL CHOLESTEROL LEVEL: ICD-10-CM

## 2020-12-16 LAB
CHOLEST SERPL-MCNC: 246 MG/DL
HBA1C MFR BLD: 5.8 % (ref 0–5.6)
HDLC SERPL-MCNC: 64 MG/DL
LDLC SERPL CALC-MCNC: 114 MG/DL
NONHDLC SERPL-MCNC: 182 MG/DL
TRIGL SERPL-MCNC: 338 MG/DL

## 2020-12-16 PROCEDURE — 80061 LIPID PANEL: CPT | Performed by: NURSE PRACTITIONER

## 2020-12-16 PROCEDURE — 83036 HEMOGLOBIN GLYCOSYLATED A1C: CPT | Performed by: NURSE PRACTITIONER

## 2021-01-15 ENCOUNTER — MYC MEDICAL ADVICE (OUTPATIENT)
Dept: NEUROLOGY | Facility: CLINIC | Age: 71
End: 2021-01-15

## 2021-01-15 ENCOUNTER — VIRTUAL VISIT (OUTPATIENT)
Dept: PHARMACY | Facility: CLINIC | Age: 71
End: 2021-01-15
Payer: COMMERCIAL

## 2021-01-15 DIAGNOSIS — R26.89 IMBALANCE: Primary | ICD-10-CM

## 2021-01-15 DIAGNOSIS — E03.9 HYPOTHYROIDISM, UNSPECIFIED TYPE: ICD-10-CM

## 2021-01-15 DIAGNOSIS — R11.0 NAUSEA: ICD-10-CM

## 2021-01-15 DIAGNOSIS — Z78.9 TAKES DIETARY SUPPLEMENTS: ICD-10-CM

## 2021-01-15 DIAGNOSIS — I10 BENIGN ESSENTIAL HYPERTENSION: Primary | ICD-10-CM

## 2021-01-15 DIAGNOSIS — G47.00 INSOMNIA, UNSPECIFIED TYPE: ICD-10-CM

## 2021-01-15 DIAGNOSIS — R42 VERTIGO: ICD-10-CM

## 2021-01-15 DIAGNOSIS — Z17.0 MALIGNANT NEOPLASM OF BREAST IN FEMALE, ESTROGEN RECEPTOR POSITIVE, UNSPECIFIED LATERALITY, UNSPECIFIED SITE OF BREAST (H): ICD-10-CM

## 2021-01-15 DIAGNOSIS — C50.919 MALIGNANT NEOPLASM OF BREAST IN FEMALE, ESTROGEN RECEPTOR POSITIVE, UNSPECIFIED LATERALITY, UNSPECIFIED SITE OF BREAST (H): ICD-10-CM

## 2021-01-15 DIAGNOSIS — M51.369 DDD (DEGENERATIVE DISC DISEASE), LUMBAR: ICD-10-CM

## 2021-01-15 DIAGNOSIS — E78.00 PURE HYPERCHOLESTEROLEMIA: ICD-10-CM

## 2021-01-15 PROCEDURE — 99605 MTMS BY PHARM NP 15 MIN: CPT | Mod: TEL | Performed by: PHARMACIST

## 2021-01-15 PROCEDURE — 99607 MTMS BY PHARM ADDL 15 MIN: CPT | Mod: TEL | Performed by: PHARMACIST

## 2021-01-15 RX ORDER — ONDANSETRON 4 MG/1
4 TABLET, ORALLY DISINTEGRATING ORAL EVERY 8 HOURS PRN
Qty: 20 TABLET | Refills: 3 | Status: SHIPPED | OUTPATIENT
Start: 2021-01-15 | End: 2021-01-20 | Stop reason: DRUGHIGH

## 2021-01-15 NOTE — LETTER
"        Date: 2021    Nadira Perez  [address]  [facility] [address]    Dear Ms. Perez,    Thank you for talking with me on 1/15/21 about your health and medications. Medicare s MTM (Medication Therapy Management) program helps you understand your medications and use them safely.      This letter includes an action plan (Medication Action Plan) and medication list (Personal Medication List). The action plan has steps you should take to help you get the best results from your medications. The medication list will help you keep track of your medications and how to use them the right way.       Have your action plan and medication list with you when you talk with your doctors, pharmacists, and other healthcare providers in your care team.     Ask your doctors, pharmacists, and other healthcare providers to update the action plan and medication list at every visit.     Take your medication list with you if you go to the hospital or emergency room.     Give a copy of the action plan and medication list to your family or caregivers.     If you want to talk about this letter or any of the papers with it, please call   [phone].We look forward to working with you, your doctors, and other healthcare providers to help you stay healthy through the [facility] MTM program.    Sincerely,  Kisha Heller [facility]    Enclosed: Medication Action Plan and Personal Medication List    MEDICATION ACTION PLAN FOR Nadira Perez,  1950     This action plan will help you get the best results from your medications if you:   1. Read \"What we talked about.\"   2. Take the steps listed in the \"What I need to do\" boxes.   3. Fill in \"What I did and when I did it.\"   4. Fill in \"My follow-up plan\" and \"Questions I want to ask.\"     Have this action plan with you when you talk with your doctors, pharmacists, and other healthcare providers in your care team. Share this with your family or caregivers " too.  DATE PREPARED: 2021  What we talked about: Blood pressure                                                  What I need to do: start monitoring at home again to determine how well meds are currently working       What I did and when I did it:                                              What we talked about: Nausea with dizziness and before PT                                                  What I need to do: I will start PA to increase ondansetron quantity for you       What I did and when I did it:                                              What we talked about: Vitamins                                                  What I need to do: Consider discontinuing glucosamine-chondroitin since it has not been helpful       What I did and when I did it:                                              What we talked about: cholesterol                                                  What I need to do: Increase atorvastatin to 80 mg daily - new Rx sent to the pharmacy       What I did and when I did it:                                                My follow-up plan:                 Questions I want to ask:              If you have any questions about your action plan, call Kisha Heller [facility], Phone: [phone] , Monday-Friday 8-4:30pm.           PERSONAL MEDICATION LIST FOR Nadira PerezSPENSER 1950     This medication list was made for you after we talked. We also used information from your doctor's chart.      Use blank rows to add new medications. Then fill in the dates you started using them.    Cross out medications when you no longer use them. Then write the date and why you stopped using them.    Ask your doctors, pharmacists, and other healthcare providers to update this list at every visit. Keep this list up-to-date with:       Prescription medications    Over the counter drugs     Herbals    Vitamins    Minerals      If you go to the hospital or emergency room, take this list with  you. Share this with your family or caregivers too.     DATE PREPARED: 1/20/2021  Allergies or side effects: Penicillins     Medication:  AMLODIPINE BESYLATE 10 MG PO TABS      How I use it:  Take 1 tablet (10 mg) by mouth daily      Why I use it: Benign essential hypertension    Prescriber:  MERCEDES Rivera CNP      Date I started using it:       Date I stopped using it:         Why I stopped using it:            Medication:  ANASTROZOLE 1 MG PO TABS      How I use it:  Take 1 tablet (1 mg) by mouth daily      Why I use it: Malignant neoplasm of left breast in female, estrogen receptor positive, unspecified site of breast (H)    Prescriber:  Khushbu Louise MD      Date I started using it:       Date I stopped using it:         Why I stopped using it:            Medication:  APAP 500 PO      How I use it:  Take 1,000 mg by mouth 3 times daily as needed       Why I use it:  Back pain    Prescriber:  Self      Date I started using it:       Date I stopped using it:         Why I stopped using it:            Medication:  ATORVASTATIN CALCIUM 40 MG PO TABS      How I use it:  Take 1 tablet (40 mg) by mouth daily      Why I use it: Pure hypercholesterolemia    Prescriber:  MERCEDES Rivera CNP      Date I started using it:       Date I stopped using it:         Why I stopped using it:            Medication:  COQ10 200 MG PO CAPS      How I use it:  Take 1 capsule by mouth daily      Why I use it:  General health    Prescriber:  Self      Date I started using it:       Date I stopped using it:         Why I stopped using it:            Medication:  CRANBERRY 600 MG PO TABS      How I use it:  Take 650 mg by mouth daily      Why I use it:  UTI prevention    Prescriber:  Self      Date I started using it:       Date I stopped using it:         Why I stopped using it:            Medication:  CYCLOBENZAPRINE HCL 10 MG PO TABS      How I use it:  Take 1 tablet (10 mg) by mouth 3 times daily as needed for  muscle spasms      Why I use it: Closed wedge compression fracture of eleventh thoracic vertebra, initial encounter    Prescriber:  MERCEDES Rivera CNP      Date I started using it:       Date I stopped using it:         Why I stopped using it:            Medication:  GABAPENTIN 300 MG PO CAPS      How I use it:  Take 1-2 capsules by mouth every 8 hours      Why I use it: Neuropathic pain    Prescriber:  MERCEDES Rivera CNP      Date I started using it:       Date I stopped using it:         Why I stopped using it:            Medication:  GLUCOSAMINE-CHONDROITIN 500-400 MG PO CAPS      How I use it:  Take 1 capsule by mouth 2 times daily       Why I use it:  Knee pain    Prescriber:  Self      Date I started using it:       Date I stopped using it:         Why I stopped using it:            Medication:  HYDROCODONE-ACETAMINOPHEN 5-325 MG PO TABS      How I use it:  Take 1 tablet by mouth every 6 hours as needed for severe pain      Why I use it: DDD (degenerative disc disease), lumbar    Prescriber:  MERCEDES Rivera CNP      Date I started using it:       Date I stopped using it:         Why I stopped using it:            Medication:  IBUPROFEN 600 MG PO TABS      How I use it:  Take 600 mg by mouth every 8 hours as needed for moderate pain       Why I use it:  Back pain    Prescriber:  Self      Date I started using it:       Date I stopped using it:         Why I stopped using it:            Medication:  LEVOTHYROXINE SODIUM 112 MCG PO TABS      How I use it:  TAKE 1/2 TABLET BY MOUTH DAILY      Why I use it: Hypothyroidism, unspecified type    Prescriber:  MERCEDES Rivera CNP      Date I started using it:       Date I stopped using it:         Why I stopped using it:               Medication:  LISINOPRIL 40 MG PO TABS      How I use it:  Take 1 tablet (40 mg) by mouth daily      Why I use it: Benign essential hypertension    Prescriber:  MERCEDES Rivera CNP      Date I  started using it:       Date I stopped using it:         Why I stopped using it:            Medication:  LORAZEPAM 2 MG PO TABS      How I use it:  Take 1 tablet at night for sleep      Why I use it: Sleep disorder    Prescriber:  MERCEDES Rivera CNP      Date I started using it:       Date I stopped using it:         Why I stopped using it:            Medication:  MAGNESIUM OXIDE 250 MG PO TABS      How I use it:  Take 1 tablet by mouth daily      Why I use it:  General health    Prescriber:  Self      Date I started using it:       Date I stopped using it:         Why I stopped using it:            Medication:  MELATONIN 5 MG PO TABS      How I use it:  Take 1 tablet by mouth daily      Why I use it:  Sleep    Prescriber:  Self      Date I started using it:       Date I stopped using it:         Why I stopped using it:            Medication:  METOPROLOL SUCCINATE ER 50 MG PO TB24      How I use it:  Take 1 tablet (50 mg) by mouth daily      Why I use it: Benign essential hypertension    Prescriber:  MERCEDES Rivera CNP      Date I started using it:       Date I stopped using it:         Why I stopped using it:            Medication:  MULTI-VITAMIN PO TABS      How I use it:  Take 1 tablet by mouth daily.      Why I use it:  General Health    Prescriber:  Self      Date I started using it:       Date I stopped using it:         Why I stopped using it:            Medication:  OMEGA-3 FISH OIL PO      How I use it:  Take 1 capsule by mouth daily       Why I use it:  General Health    Prescriber:  Self      Date I started using it:       Date I stopped using it:         Why I stopped using it:            Medication:  ONDANSETRON 4 MG PO TBDP      How I use it:  Take 1 tablet (4 mg) by mouth every 8 hours as needed for nausea      Why I use it: Nausea    Prescriber:  Clau BUTT Cha, MD      Date I started using it:       Date I stopped using it:         Why I stopped using it:            Medication:   POTASSIUM CHLORIDE MELODY ER 20 MEQ PO TBCR      How I use it:  Take 20 meq  Three times a week.      Why I use it: Hypopotassemia    Prescriber:  MERCEDES Rivera CNP      Date I started using it:       Date I stopped using it:         Why I stopped using it:            Medication:  REPHRESH VA GEL      How I use it:  Place 2 g vaginally every 3 days      Why I use it:  Vaginal dryness    Prescriber:  MERCEDES Rivera CNP      Date I started using it:       Date I stopped using it:         Why I stopped using it:            Medication:  TERBINAFINE HCL 1 % EX CREA      How I use it:  Apply topically 2 times daily For fungal infection not resolved with other antifungals (e.g. Clotrimazole)      Why I use it: Tinea pedis of both feet    Prescriber:  MERCEDES Rivera CNP      Date I started using it:       Date I stopped using it:         Why I stopped using it:            Medication:  TIZANIDINE HCL 2 MG PO TABS      How I use it:  Take 1-2 tablets (2-4 mg) by mouth 3 times daily as needed for muscle spasms      Why I use it: Chronic right-sided low back pain with right-sided sciatica    Prescriber:  Bob Arredondo MD      Date I started using it:       Date I stopped using it:         Why I stopped using it:            Medication:  TRIAMCINOLONE ACETONIDE 0.1 % EX OINT      How I use it:  Apply topically 2 times daily      Why I use it: Eczema, unspecified type    Prescriber:  MERCEDES Rivera CNP      Date I started using it:       Date I stopped using it:         Why I stopped using it:            Medication:  TRIAMCINOLONE ACETONIDE 40 MG/ML IJ SUSP      How I use it:         Why I use it: Left wrist pain; Right wrist pain    Prescriber:  Lien Prajapati MD      Date I started using it:       Date I stopped using it:         Why I stopped using it:            Medication:  TRIAMTERENE-HCTZ 37.5-25 MG PO TABS      How I use it:  Take 1 tablet by mouth daily      Why I use  it: Essential hypertension, benign    Prescriber:  MERCEDES Rivera CNP      Date I started using it:       Date I stopped using it:         Why I stopped using it:            Medication:  VITAMIN C 500 MG PO CHEW      How I use it:  Take 1 tablet by mouth daily      Why I use it:  General Health    Prescriber:  Self      Date I started using it:       Date I stopped using it:         Why I stopped using it:            Medication:  VITAMIN D3 2000 UNITS PO CAPS      How I use it:  Take 6,000 Units by mouth daily      Why I use it:      Prescriber:  MERCEDES Rivera CNP      Date I started using it:       Date I stopped using it:         Why I stopped using it:            Medication:  VOLTAREN 1 % TD GEL      How I use it:  Apply 4 grams to knees or 2 grams to hands four times daily using enclosed dosing card.      Why I use it: Right knee pain, unspecified chronicity    Prescriber:  MERCEDES Rivera CNP      Date I started using it:       Date I stopped using it:         Why I stopped using it:            Medication:  ZINC GLUCONATE 50 MG PO TABS      How I use it:  Take 50 mg by mouth 2 times daily      Why I use it:  Covid19 prevention    Prescriber:  Self      Date I started using it:       Date I stopped using it:         Why I stopped using it:            Medication:         How I use it:         Why I use it:      Prescriber:         Date I started using it:       Date I stopped using it:         Why I stopped using it:            Medication:         How I use it:         Why I use it:      Prescriber:         Date I started using it:       Date I stopped using it:         Why I stopped using it:            Medication:         How I use it:         Why I use it:      Prescriber:         Date I started using it:       Date I stopped using it:         Why I stopped using it:              Other Information:     If you have any questions about your medication list, call Kisah Heller  [facility], Phone: [phone] , Monday-Friday 8-4:30pm.    According to the Paperwork Reduction Act of 1995, no persons are required to respond to a collection of information unless it displays a valid OMB control number. The valid OMB number for this information collection is [id number]. The time required to complete this information collection is estimated to average 40 minutes per response, including the time to review instructions, searching existing data resources, gather the data needed, and complete and review the information collection. If you have any comments concerning the accuracy of the time estimate(s) or suggestions for improving this form, please write to: CMS, Attn: CARSON Reports Clearance Officer, [address] [place] [id number].

## 2021-01-15 NOTE — PROGRESS NOTES
Medication Therapy Management (MTM) Encounter    ASSESSMENT:                            Medication Adherence/Access: No issues identified    Hypertension: Would benefit from resuming home monitoring of BP.     Hyperlipidemia: Would benefit from dose increase in atorvastatin to achieve ideal LDL of <100.     Breast Cancer: Plan in place with oncology.     Takes Dietary Supplements: Consider reducing or eliminating glucosamine-chondroitin due to lack of efficacy and reduce polypharmacy.     Chronic Low Back Pain: Consider reducing use of ibuprofen to as needed only.     Insomnia: Stable.     Vertigo: Would benefit from ondansetron before PT.     Hypothyroidism: Stable.       PLAN:                            1. Monitor BP at home.   2. Increase atorvastatin to 80 mg daily.  3. Discontinue glucosamine-chondroitin.   4. Use ibuprofen sparingly.   5. Will start PA for higher quantity of ondansetron.     Follow-up: with results of PA    SUBJECTIVE/OBJECTIVE:                          Nadira Perez is a 70 year old female called for a follow-up visit. She was referred to me from Matilde Quiñonez.  Today's visit is a follow-up MTM visit from 6/19/20. This will serve as our initial visit of 2021.      Reason for visit: CMR.    Allergies/ADRs: Reviewed in chart  Tobacco: She reports that she has never smoked. She has never used smokeless tobacco.  Alcohol: rare use  Caffeine: 1-2 cups/day of coffee  Activity: none due to vertigo currently  Past Medical History: Reviewed in chart      Medication Adherence/Access: no issues reported    Hypertension: Current medications include amlodipine 10 mg dialy, lisinopril 40 mg daily, metoprolol XL 50 mg daily, triamterene-hydrochlorothiazide 37.5-25 mg daily, potassium Cl 20 mEq three times weekly.  Patient does not self-monitor blood pressure.  Patient reports the following medication side effects: light headedness, recent falls, not sure if this is related to hypotension. Intends to  start monitoring BP again.   BP Readings from Last 3 Encounters:   11/09/20 (!) 152/90   10/22/20 126/83   09/11/20 128/68     Last Comprehensive Metabolic Panel:  Sodium   Date Value Ref Range Status   05/12/2020 139 133 - 144 mmol/L Final     Potassium   Date Value Ref Range Status   05/12/2020 3.7 3.4 - 5.3 mmol/L Final     Comment:     Specimen slightly hemolyzed, potassium may be falsely elevated     Chloride   Date Value Ref Range Status   05/12/2020 106 94 - 109 mmol/L Final     Carbon Dioxide   Date Value Ref Range Status   05/12/2020 28 20 - 32 mmol/L Final     Anion Gap   Date Value Ref Range Status   05/12/2020 5 3 - 14 mmol/L Final     Glucose   Date Value Ref Range Status   05/12/2020 112 (H) 70 - 99 mg/dL Final     Urea Nitrogen   Date Value Ref Range Status   05/12/2020 16 7 - 30 mg/dL Final     Creatinine   Date Value Ref Range Status   11/09/2020 0.72 0.52 - 1.04 mg/dL Final     GFR Estimate   Date Value Ref Range Status   11/09/2020 86 >60 mL/min/[1.73_m2] Final     Comment:     Non  GFR Calc  Starting 12/18/2018, serum creatinine based estimated GFR (eGFR) will be   calculated using the Chronic Kidney Disease Epidemiology Collaboration   (CKD-EPI) equation.       Calcium   Date Value Ref Range Status   11/09/2020 9.2 8.5 - 10.1 mg/dL Final     Hyperlipidemia: Current therapy includes atorvastatin 40 mg daily.  Patient reports no significant myalgias or other side effects.  The 10-year ASCVD risk score (Ranidinorah TOMAS Jr., et al., 2013) is: 14.7%    Values used to calculate the score:      Age: 70 years      Sex: Female      Is Non- : No      Diabetic: No      Tobacco smoker: No      Systolic Blood Pressure: 152 mmHg      Is BP treated: Yes      HDL Cholesterol: 64 mg/dL      Total Cholesterol: 246 mg/dL  Recent Labs   Lab Test 12/16/20  1105 11/11/19  1510 10/30/15  1221 10/30/15  1221 11/06/14  1141   CHOL 246* 210*   < > 182 228*   HDL 64 66   < > 48* 63   LDL  114* 106*   < > 102 125   TRIG 338* 190*   < > 159* 198*   CHOLHDLRATIO  --   --   --  3.8 3.6    < > = values in this interval not displayed.     Breast Cancer: Current medications include anastrozole 1 mg daily. She probably will be done in 18 months because she will have 10 years of therapy. She denies side effects but is nervous to discontinue. She see Khushbu Louise.     Takes Dietary Supplements: Current medications include vitamin C 500 mg daily, vitamin 6000 units daily, coenzyme Q 200 mg daily, zinc 50 mg twice daily, fish oil 1 g daily, MVI daily, magnesium 250 mg daily, glucosamine-chondroitin 500-440 1 cap twice daily, cranberry 650 mg daily. UTI's are much less frequent with cranberry. She reports no side effects. She is open to discontinuing the glucosamine-chondroitin because she hasn't seen efficacy in the >1 year that she has used it.   Lab Results   Component Value Date    VITDT 56 05/12/2020    VITDT 62 11/11/2019    VITDT 68 11/13/2018    VITDT 60 04/17/2017    VITDT 49 10/30/2015     Chronic Low Back Pain: Current medications include tizanidine 2 mg take 1-2 tabs three times daily as needed (using rarely, and not at the same time as Norco or cyclobenzaprine), Voltaren gel 1% as needed, hydrocodone-APAP 5-325 mg very occasionally (using 1-2 a week), Ibuprofen 600 mg three times daily, cyclobenzaprine 10 mg three times daily as needed (using maybe once a month), gabapentin 300 mg take 1-2 caps every 8 hours, APAP 1000 mg three times daily. She is managing well with this current regimen.      Insomnia: Current medications include: melatonin 5 mg daily and lorazepam 2 mg as needed (hasn't used in over year). Patient reports no issues at this time.     Vertigo: No current meds. Restarting dizzy and balance exercise soon. She delayed starting this because she has been so busy with work. Meclizine and dramamine have not been helpful. She uses ondansetron but the insurance company won't allow her to use as  "much as she would like. She isn't sure how many she would need a day because she has been off of them for so long. She wants to be able to use them before therapy and home exercises (about twice a day). She believes this is an inner ear issue.     Hypothyroidism: Patient is taking levothyroxine 112 mcg daily. Patient is having the following symptoms: none.   TSH   Date Value Ref Range Status   11/11/2019 1.91 0.40 - 4.00 mU/L Final     Today's Vitals: There were no vitals taken for this visit. - telemed    BP Readings from Last 1 Encounters:   11/09/20 (!) 152/90     Pulse Readings from Last 1 Encounters:   11/09/20 96     Wt Readings from Last 1 Encounters:   10/22/20 172 lb 6.4 oz (78.2 kg)     Ht Readings from Last 1 Encounters:   09/11/20 5' 7\" (1.702 m)     Estimated body mass index is 27 kg/m  as calculated from the following:    Height as of 9/11/20: 5' 7\" (1.702 m).    Weight as of 10/22/20: 172 lb 6.4 oz (78.2 kg).    Temp Readings from Last 1 Encounters:   11/09/20 98.2  F (36.8  C) (Oral)     ----------------    Medicare Part D topics discussed:OTC products, Self-monitoring, Medication changes and Medication cost    I spent 31 minutes with this patient today (an extra 15 minutes was spent creating the Medication Action Plan). All changes were made via collaborative practice agreement with Matilde Quiñonez. A copy of the visit note was provided to the patient's primary care provider.    The patient was sent via [employer] a summary of these recommendations.     Kisha Heller, Pharm.D., [facility]CP  Medication Therapy Management Pharmacist  Page/VM:  [phone]    Telemedicine Visit Details  Type of service:  Telephone visit  Start Time: 3:06 PM  End Time: 3:37 PM  Originating Location (patient location): [place]  Distant Location (provider location):  [facility] MEDICATION THERAPY MANAGEMENT      Medication Therapy Recommendations  No medication therapy recommendations to display      "

## 2021-01-15 NOTE — TELEPHONE ENCOUNTER
The zofran has been reordered and sent to the pharmacy on [address]. Physical therapy was re-requested.

## 2021-01-18 ENCOUNTER — OFFICE VISIT (OUTPATIENT)
Dept: OPHTHALMOLOGY | Facility: CLINIC | Age: 71
End: 2021-01-18
Attending: OPHTHALMOLOGY
Payer: COMMERCIAL

## 2021-01-18 DIAGNOSIS — H43.812 PVD (POSTERIOR VITREOUS DETACHMENT), LEFT EYE: Primary | ICD-10-CM

## 2021-01-18 DIAGNOSIS — Z96.1 PSEUDOPHAKIA OF BOTH EYES: ICD-10-CM

## 2021-01-18 DIAGNOSIS — H33.312 RETINAL TEAR OF LEFT EYE: ICD-10-CM

## 2021-01-18 PROCEDURE — 92250 FUNDUS PHOTOGRAPHY W/I&R: CPT | Performed by: OPHTHALMOLOGY

## 2021-01-18 PROCEDURE — 92015 DETERMINE REFRACTIVE STATE: CPT

## 2021-01-18 PROCEDURE — 92014 COMPRE OPH EXAM EST PT 1/>: CPT | Mod: 57 | Performed by: OPHTHALMOLOGY

## 2021-01-18 PROCEDURE — G0463 HOSPITAL OUTPT CLINIC VISIT: HCPCS | Mod: 25

## 2021-01-18 PROCEDURE — 67145 PROPH RTA DTCHMNT PC: CPT | Mod: LT | Performed by: OPHTHALMOLOGY

## 2021-01-18 ASSESSMENT — REFRACTION_WEARINGRX
OS_CYLINDER: +1.00
OS_SPHERE: PLANO
OD_SPHERE: -0.50
OS_ADD: +2.75
OD_CYLINDER: +1.25
OS_AXIS: 050
OD_ADD: +2.75
OD_AXIS: 170

## 2021-01-18 ASSESSMENT — VISUAL ACUITY
METHOD: SNELLEN - LINEAR
OD_SC: 20/25
OS_SC: 20/40
OS_PH_SC: 20/30
OS_PH_SC+: -2
OD_SC+: -3

## 2021-01-18 ASSESSMENT — REFRACTION_MANIFEST
OD_AXIS: 165
OD_SPHERE: -0.50
OD_CYLINDER: +1.00
OS_ADD: +2.75
OS_AXIS: 020
OS_SPHERE: -0.50
OS_CYLINDER: +0.75
OD_ADD: +2.75

## 2021-01-18 ASSESSMENT — CONF VISUAL FIELD
METHOD: COUNTING FINGERS
OD_NORMAL: 1
OS_NORMAL: 1

## 2021-01-18 ASSESSMENT — TONOMETRY
IOP_METHOD: TONOPEN
OS_IOP_MMHG: 18
OD_IOP_MMHG: 14

## 2021-01-18 NOTE — PROGRESS NOTES
I have confirmed the patient's and reviewed Past Medical History, Past Surgical History, Social History, Family History, Problem List, Medication List and agree with Tech note.    CC: blurry vision left eye     Interval History: Vision has improved since last visit.  She has still some opaque floating film in the left eye.  No new flashes.    HPI: Patient fell 9/24/2020.  Blurry vision left eye starting around 10/2/2020.  No pain, floaters are stable; denies flashes.     Assessment/plan:   1.  Posterior vitreous detachment (PVD) with sub hyaloid hemorrhage OS   - Fell 9/24/2020   - Acute blurry vision 10/2/2020   - vitreous hemorrhage mostly resolved; There is a HST at inferotemporal periphery (optos pictures taken): will do barrier laser today   - no RD   - S/Sx of RD discussed for immediate return   - Recheck in 6 weeks; and then resume normal eye care    2. Retinal tear left eye   - see #1; barricade laser today      3. Pseudophakia each eye   - right eye s/p yag capsulotomy   - left eye PCO: needs yag caps in future           RTC dilated fundus exam 6 weeks       Complete documentation of historical and exam elements from today's encounter can be found in the full encounter summary report (not reduplicated in this progress note). I personally obtained the chief complaint(s) and history of present illness.  I confirmed and edited as necessary the review of systems, past medical/surgical history, family history, social history, and examination findings as documented by others; and I examined the patient myself. I personally reviewed the relevant tests, images, and reports as documented above. I formulated and edited as necessary the assessment and plan and discussed the findings and management plan with the patient and family.    Felix Oliveros MD

## 2021-01-18 NOTE — NURSING NOTE
Chief Complaints and History of Present Illnesses   Patient presents with     Follow Up     6 week follow up Posterior vitreous detachment (PVD) with sub hyaloid hemorrhage OS     Chief Complaint(s) and History of Present Illness(es)     Follow Up     Comments: 6 week follow up Posterior vitreous detachment (PVD) with sub hyaloid hemorrhage OS              Comments     Pt states vision is improving in LE. Pt still seeing slight floater in LE, but no changes.  No eye pain today. No flashes.    MIA Hale January 18, 2021 1:32 PM

## 2021-01-20 ENCOUNTER — THERAPY VISIT (OUTPATIENT)
Dept: PHYSICAL THERAPY | Facility: CLINIC | Age: 71
End: 2021-01-20
Payer: COMMERCIAL

## 2021-01-20 DIAGNOSIS — M54.16 LUMBAR RADICULOPATHY: ICD-10-CM

## 2021-01-20 PROCEDURE — 97110 THERAPEUTIC EXERCISES: CPT | Mod: GP | Performed by: PHYSICAL THERAPIST

## 2021-01-20 RX ORDER — ONDANSETRON 4 MG/1
4 TABLET, ORALLY DISINTEGRATING ORAL EVERY 12 HOURS PRN
Qty: 180 TABLET | Refills: 3 | Status: SHIPPED | OUTPATIENT
Start: 2021-01-20 | End: 2021-02-23

## 2021-01-27 ENCOUNTER — TELEPHONE (OUTPATIENT)
Dept: PHARMACY | Facility: CLINIC | Age: 71
End: 2021-01-27

## 2021-01-27 NOTE — TELEPHONE ENCOUNTER
Prior Authorization Retail Medication Request    Medication/Dose:   ICD code (if different than what is on RX):  R11.2, R42  Previously Tried and Failed:  Meclizine, dimenhydrinate, ondansetron as needed  Rationale:  Patient gets very nauseous with movement and is unable to complete physical therapy due to nausea. Nausea is likely to improve if dizziness and imbalance improves. Use of meclizine and dimenhydrinate have not been helpful to prevent nausea before physical therapy. Ondansetron works very well to alleviate nausea with physical therapy but her current quantity allowed does not provide enough doses for her to use each month before each physical therapy session. She would benefit from use of ondansetron daily before physical therapy sessions.     Insurance Name:  BC  Insurance ID:  [id number]      Pharmacy Information (if different than what is on RX)  Name:  [facility] [place]  Phone:  [phone]

## 2021-01-28 NOTE — TELEPHONE ENCOUNTER
Central Prior Authorization Team   Phone: [phone]      PA Initiation    Medication: Ondansetron 4 mg ODT  Insurance Company: [employer] - Phone [phone] Fax [phone]  Pharm[facility] Filling the [id number] [place], [place] - [address]  [address]  Filling Pharmacy Phone: [phone]  Filling Pharmacy Fax:    Start Date: 1/28/2021

## 2021-02-01 NOTE — TELEPHONE ENCOUNTER
PRIOR AUTHORIZATION DENIED    Medication: Ondansetron 4 mg ODT- DENIED    Denial Date: 1/29/2021    Denial Rational:           Appeal Information:

## 2021-02-04 ASSESSMENT — CUP TO DISC RATIO
OD_RATIO: 0.4
OS_RATIO: 0.45

## 2021-02-04 ASSESSMENT — SLIT LAMP EXAM - LIDS
COMMENTS: NORMAL
COMMENTS: NORMAL

## 2021-02-04 ASSESSMENT — EXTERNAL EXAM - RIGHT EYE: OD_EXAM: NORMAL

## 2021-02-04 ASSESSMENT — EXTERNAL EXAM - LEFT EYE: OS_EXAM: NORMAL

## 2021-02-23 ENCOUNTER — OFFICE VISIT (OUTPATIENT)
Dept: OPHTHALMOLOGY | Facility: CLINIC | Age: 71
End: 2021-02-23
Attending: OPHTHALMOLOGY
Payer: COMMERCIAL

## 2021-02-23 DIAGNOSIS — H43.812 PVD (POSTERIOR VITREOUS DETACHMENT), LEFT EYE: Primary | ICD-10-CM

## 2021-02-23 DIAGNOSIS — R11.0 NAUSEA: Primary | ICD-10-CM

## 2021-02-23 DIAGNOSIS — H18.529 ANTERIOR BASEMENT MEMBRANE DYSTROPHY: ICD-10-CM

## 2021-02-23 DIAGNOSIS — H33.312 RETINAL TEAR OF LEFT EYE: ICD-10-CM

## 2021-02-23 DIAGNOSIS — Z96.1 PSEUDOPHAKIA OF BOTH EYES: ICD-10-CM

## 2021-02-23 PROCEDURE — 99024 POSTOP FOLLOW-UP VISIT: CPT | Mod: GC | Performed by: OPHTHALMOLOGY

## 2021-02-23 PROCEDURE — G0463 HOSPITAL OUTPT CLINIC VISIT: HCPCS

## 2021-02-23 RX ORDER — METOCLOPRAMIDE 5 MG/1
5 TABLET ORAL 3 TIMES DAILY PRN
Qty: 30 TABLET | Refills: 3 | Status: SHIPPED | OUTPATIENT
Start: 2021-02-23 | End: 2021-10-28

## 2021-02-23 ASSESSMENT — CUP TO DISC RATIO
OS_RATIO: 0.45
OD_RATIO: 0.4

## 2021-02-23 ASSESSMENT — EXTERNAL EXAM - RIGHT EYE: OD_EXAM: NORMAL

## 2021-02-23 ASSESSMENT — TONOMETRY
IOP_METHOD: TONOPEN
OD_IOP_MMHG: 17
OS_IOP_MMHG: 18

## 2021-02-23 ASSESSMENT — EXTERNAL EXAM - LEFT EYE: OS_EXAM: NORMAL

## 2021-02-23 ASSESSMENT — VISUAL ACUITY
METHOD: SNELLEN - LINEAR
OS_SC: 20/30
OD_SC+: -1
OD_SC: 20/25
OS_SC+: -1

## 2021-02-23 ASSESSMENT — SLIT LAMP EXAM - LIDS
COMMENTS: NORMAL
COMMENTS: NORMAL

## 2021-02-23 NOTE — PROGRESS NOTES
I have confirmed the patient's and reviewed Past Medical History, Past Surgical History, Social History, Family History, Problem List, Medication List and agree with Tech note.    CC: blurry vision left eye     Interval History: States vision stable since last exam. Still with some floaters in both eyes. Notes a large floater in the left eye that is bothersome. No flashes of light.    HPI: Patient fell 9/24/2020.  Blurry vision left eye starting around 10/2/2020. Noted to have HST - now s/p barricade laser 1/18/2021. No pain, floaters are stable; denies flashes.     Assessment/plan:   1.  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 Retinal tear left eye   - see #1; s/p barricade laser 1/18/21    3. Pseudophakia each eye   - right eye s/p yag capsulotomy   - left eye PCO: needs yag caps in future     RTC: 4-6 months for DFE    Denny Dewitt MD  Ophthalmology Resident, PGY-4    Complete documentation of historical and exam elements from today's encounter can be found in the full encounter summary report (not reduplicated in this progress note). I personally obtained the chief complaint(s) and history of present illness.  I confirmed and edited as necessary the review of systems, past medical/surgical history, family history, social history, and examination findings as documented by others; and I examined the patient myself. I personally reviewed the relevant tests, images, and reports as documented above. I formulated and edited as necessary the assessment and plan and discussed the findings and management plan with the patient and family.    Felix Oliveros MD

## 2021-02-23 NOTE — NURSING NOTE
Chief Complaints and History of Present Illnesses   Patient presents with     Post Op (Ophthalmology) Left Eye     Chief Complaint(s) and History of Present Illness(es)     Post Op (Ophthalmology) Left Eye     Laterality: left eye    Onset: 5              Comments     Pt. States that she is doing well. Still seeing a lot of floaters BE. Seeing one large one in LE that is bothersome. No change in VA BE.   Letitia Nagy COT 1:18 PM February 23, 2021

## 2021-02-26 ENCOUNTER — THERAPY VISIT (OUTPATIENT)
Dept: PHYSICAL THERAPY | Facility: CLINIC | Age: 71
End: 2021-02-26
Payer: COMMERCIAL

## 2021-02-26 DIAGNOSIS — M54.16 LUMBAR RADICULOPATHY: ICD-10-CM

## 2021-02-26 PROCEDURE — 97110 THERAPEUTIC EXERCISES: CPT | Mod: GP | Performed by: PHYSICAL THERAPY ASSISTANT

## 2021-02-26 NOTE — PROGRESS NOTES
Subjective:  HPI  Physical Exam  Oswestry Score: 42.22 %                 Objective:  System    Physical Exam    General     ROS    Assessment/Plan:    PROGRESS  REPORT    Progress reporting period is from 10/21/21 to 2/26/21.      SUBJECTIVE  Subjective changes noted by patient:  Patient reports that she flared up yesterday and she has pain down the right L/E.  She has been working everyday.  She has lost 20#.  She states that she had done Pilate's in the past but due to closer of COVID.  She states that that has been helpful for keeping her back pain in control.  Patient has to flex fwd at the waist to give her pain relief.     Current pain level is 6/10    Previous pain level was:   Initial Pain level: 9/10   Changes in function:  None     Adverse reaction to treatment or activity: None     OBJECTIVE  Changes noted in objective findings:  Yes, Patient has been coming to therapy approx 1 time a month.  She is walking with a WW.  She is able to walk short distances without the walker in the clinic.  She is has a flexed posture,but is able to perform exercises and limit the extension of the spine.  Progressing with her exercises to improve her CORE and lumbar strength.

## 2021-03-03 NOTE — PROGRESS NOTES
Assessment/Plan:    ASSESSMENT/PLAN  Updated problem list and treatment plan: Diagnosis 1:  Lumbar radiculopathy   Pain -  hot/cold therapy, manual therapy, splint/taping/bracing/orthotics, self management, education and home program  Decreased ROM/flexibility - manual therapy, therapeutic exercise, therapeutic activity and home program  Impaired gait - gait training and home program  Decreased function - therapeutic activities and home program  Impaired posture - neuro re-education, therapeutic activities and home program    STG/LTGs have been met:  Yes (See Goal flow sheet completed today.)  Progress toward STG/LTGs have been made:  Yes (See Goal flow sheet completed today.)  Assessment of Progress: The patient's condition is improving.  The patient's condition has potential to improve.  Self Management Plans:  Patient has been instructed in a home treatment program.  Patient  has been instructed in self management of symptoms.  I have re-evaluated this patient and find that the nature, scope, duration and intensity of the therapy is appropriate for the medical condition of the patient.  Patient continues to require the following intervention to meet STG and LT's:  PT    Recommendations:  This patient would benefit from continued therapy.     Frequency:  2 X a month, once daily  Duration:  for 2 months    Please refer to the daily flowsheet for treatment today, total treatment time and time spent performing 1:1 timed codes.

## 2021-03-09 NOTE — TELEPHONE ENCOUNTER
DIAGNOSIS: Bilateral wrist pain both wrist, Requesting cortisone injection   APPOINTMENT DATE: 3.17.21   NOTES STATUS DETAILS   OFFICE NOTE from referring provider N/A    OFFICE NOTE from other specialist Internal 10.29.20 Dr Lien Prajapati, [facility]  1.2.20  12.21.17   12.22.16  10.29.14 Dr Av Mendez, [facility]  More in [facility]   DISCHARGE SUMMARY from hospital N/A    DISCHARGE REPORT from the ER N/A    OPERATIVE REPORT N/A    EMG report N/A    MEDICATION LIST Internal    MRI N/A    DEXA (osteoporosis/bone health) Internal 10.27.16 wrist/heel/radius  11.8.13 wrist/heel/radius  More in Epic   CT SCAN N/A    XRAYS (IMAGES & REPORTS) Internal 10.29.20 B hand  1.2.20 R wrist, L wrist  12.21.17 B hand  12.22.16 B wrist  6.4.14 L wrist  More in Epic

## 2021-03-11 ENCOUNTER — THERAPY VISIT (OUTPATIENT)
Dept: PHYSICAL THERAPY | Facility: CLINIC | Age: 71
End: 2021-03-11
Payer: COMMERCIAL

## 2021-03-11 DIAGNOSIS — M54.16 LUMBAR RADICULOPATHY: ICD-10-CM

## 2021-03-11 PROCEDURE — 97530 THERAPEUTIC ACTIVITIES: CPT | Mod: GP | Performed by: PHYSICAL THERAPY ASSISTANT

## 2021-03-11 PROCEDURE — 97110 THERAPEUTIC EXERCISES: CPT | Mod: GP | Performed by: PHYSICAL THERAPY ASSISTANT

## 2021-03-17 ENCOUNTER — PRE VISIT (OUTPATIENT)
Dept: ORTHOPEDICS | Facility: CLINIC | Age: 71
End: 2021-03-17

## 2021-03-26 ENCOUNTER — THERAPY VISIT (OUTPATIENT)
Dept: PHYSICAL THERAPY | Facility: CLINIC | Age: 71
End: 2021-03-26
Payer: COMMERCIAL

## 2021-03-26 DIAGNOSIS — M54.16 LUMBAR RADICULOPATHY: ICD-10-CM

## 2021-03-26 PROCEDURE — 97110 THERAPEUTIC EXERCISES: CPT | Mod: GP | Performed by: PHYSICAL THERAPY ASSISTANT

## 2021-04-09 ENCOUNTER — THERAPY VISIT (OUTPATIENT)
Dept: PHYSICAL THERAPY | Facility: CLINIC | Age: 71
End: 2021-04-09
Payer: COMMERCIAL

## 2021-04-09 DIAGNOSIS — M54.16 LUMBAR RADICULOPATHY: ICD-10-CM

## 2021-04-09 PROCEDURE — 97110 THERAPEUTIC EXERCISES: CPT | Mod: GP | Performed by: PHYSICAL THERAPY ASSISTANT

## 2021-04-09 NOTE — PROGRESS NOTES
Assessment/Plan:    ASSESSMENT/PLAN  Updated problem list and treatment plan: Diagnosis 1:  Lumbar radiculopathy   STG/LTGs have been met:  Yes  Progress toward STG/LTGs have been made:  Yes  Assessment of Progress: The patient's condition is improving.  The patient's condition has potential to improve.  Self Management Plans:  Patient is independent in a home treatment program.  Patient is independent in self management of symptoms.  Patient continues to require the following intervention to meet STG and LT's:  PT intervention is no longer required to meet STG/LTG.    Recommendations:  This patient is ready to be discharged from therapy and continue their home treatment program.    Please refer to the daily flowsheet for treatment today, total treatment time and time spent performing 1:1 timed codes.

## 2021-04-09 NOTE — PROGRESS NOTES
Subjective:  HPI  Physical Exam                    Objective:  System         Lumbar/SI Evaluation  ROM:    AROM Lumbar:   Flexion:          100%  Ext:                    To neutral   Side Bend:        Left:     Right:   Rotation:           Left:  75%    Right:  100%  Side Glide:        Left:     Right:                                                                          General     ROS    Assessment/Plan:    DISCHARGE REPORT    Progress reporting period is from 10/21/21 to 4/9/21.      SUBJECTIVE  Subjective changes noted by patient:  Patient reports that she has found out that she can do her Piliates at the  as they opened back up.  She is feeling good with her HEP.  Takes Ibuprofen and tylenol 3 times a day.  Less prescription medication.  MD aware of the of the amount of the OTC med's that she takes.  Current pain level is 5/10.       Previous pain level was:   Initial Pain level: 9/10   Changes in function:  Yes (See Goal flowsheet attached for changes in current functional level)     Adverse reaction to treatment or activity: None     OBJECTIVE  Changes noted in objective findings:  Yes, Patient has progressed with her exercises and is able to advance to the [facility] for further exercises independently.  Patient does have scoliosis and when she walks without the WW but very shifted with her spine, better posture with the WW.  She needs flexion of the trunk for stretches and pain relief.

## 2021-04-21 ENCOUNTER — TELEPHONE (OUTPATIENT)
Dept: PHARMACY | Facility: CLINIC | Age: 71
End: 2021-04-21

## 2021-04-26 DIAGNOSIS — I10 BENIGN ESSENTIAL HYPERTENSION: ICD-10-CM

## 2021-04-28 RX ORDER — AMLODIPINE BESYLATE 10 MG/1
10 TABLET ORAL DAILY
Qty: 90 TABLET | Refills: 0 | Status: SHIPPED | OUTPATIENT
Start: 2021-04-28 | End: 2021-05-07

## 2021-04-28 NOTE — TELEPHONE ENCOUNTER
amLODIPine (NORVASC) 10 MG tablet   Take 1 tablet (10 mg) by mouth daily     Last Written Prescription Date:  5/4/20  Last Fill Quantity: 90,   # refills: 3  Last Office Visit : 10/22/20  Future Office visit:  none    Routing because:    BP > 140  11/09/20 (!) 152/90     90 RF to pharmacy. Clinic notified.

## 2021-04-29 DIAGNOSIS — M25.532 LEFT WRIST PAIN: Primary | ICD-10-CM

## 2021-04-29 DIAGNOSIS — M25.531 RIGHT WRIST PAIN: ICD-10-CM

## 2021-05-04 ENCOUNTER — MYC MEDICAL ADVICE (OUTPATIENT)
Dept: ONCOLOGY | Facility: CLINIC | Age: 71
End: 2021-05-04

## 2021-05-04 ENCOUNTER — VIRTUAL VISIT (OUTPATIENT)
Dept: PHARMACY | Facility: CLINIC | Age: 71
End: 2021-05-04
Payer: COMMERCIAL

## 2021-05-04 DIAGNOSIS — I10 BENIGN ESSENTIAL HYPERTENSION: ICD-10-CM

## 2021-05-04 DIAGNOSIS — E55.9 VITAMIN D DEFICIENCY: ICD-10-CM

## 2021-05-04 DIAGNOSIS — E78.00 PURE HYPERCHOLESTEROLEMIA: Primary | ICD-10-CM

## 2021-05-04 DIAGNOSIS — C50.919 MALIGNANT NEOPLASM OF FEMALE BREAST, UNSPECIFIED ESTROGEN RECEPTOR STATUS, UNSPECIFIED LATERALITY, UNSPECIFIED SITE OF BREAST (H): Primary | ICD-10-CM

## 2021-05-04 DIAGNOSIS — Z78.9 TAKES DIETARY SUPPLEMENTS: ICD-10-CM

## 2021-05-04 PROCEDURE — 99606 MTMS BY PHARM EST 15 MIN: CPT | Performed by: PHARMACIST

## 2021-05-04 PROCEDURE — 99607 MTMS BY PHARM ADDL 15 MIN: CPT | Performed by: PHARMACIST

## 2021-05-07 ENCOUNTER — HOSPITAL ENCOUNTER (OUTPATIENT)
Dept: LAB | Facility: CLINIC | Age: 71
Discharge: HOME | End: 2021-05-07
Attending: INTERNAL MEDICINE | Admitting: PHARMACIST
Payer: COMMERCIAL

## 2021-05-07 DIAGNOSIS — E78.00 PURE HYPERCHOLESTEROLEMIA: ICD-10-CM

## 2021-05-07 DIAGNOSIS — E55.9 VITAMIN D DEFICIENCY: ICD-10-CM

## 2021-05-07 DIAGNOSIS — I10 BENIGN ESSENTIAL HYPERTENSION: ICD-10-CM

## 2021-05-07 DIAGNOSIS — E78.00 PURE HYPERCHOLESTEROLEMIA: Primary | ICD-10-CM

## 2021-05-07 DIAGNOSIS — C50.919 MALIGNANT NEOPLASM OF FEMALE BREAST, UNSPECIFIED ESTROGEN RECEPTOR STATUS, UNSPECIFIED LATERALITY, UNSPECIFIED SITE OF BREAST (H): Primary | ICD-10-CM

## 2021-05-07 LAB
ALBUMIN SERPL-MCNC: 4.2 G/DL (ref 3.4–5)
ALP SERPL-CCNC: 70 U/L (ref 40–150)
ALT SERPL W P-5'-P-CCNC: 25 U/L (ref 0–50)
ANION GAP SERPL CALCULATED.3IONS-SCNC: 4 MMOL/L (ref 3–14)
ANION GAP SERPL CALCULATED.3IONS-SCNC: 5 MMOL/L (ref 3–14)
AST SERPL W P-5'-P-CCNC: 18 U/L (ref 0–45)
BASOPHILS # BLD AUTO: 0.1 10E9/L (ref 0–0.2)
BASOPHILS NFR BLD AUTO: 0.8 %
BILIRUB SERPL-MCNC: 0.5 MG/DL (ref 0.2–1.3)
BUN SERPL-MCNC: 21 MG/DL (ref 7–30)
BUN SERPL-MCNC: 22 MG/DL (ref 7–30)
CALCIUM SERPL-MCNC: 9.5 MG/DL (ref 8.5–10.1)
CALCIUM SERPL-MCNC: 9.6 MG/DL (ref 8.5–10.1)
CHLORIDE SERPL-SCNC: 105 MMOL/L (ref 94–109)
CHLORIDE SERPL-SCNC: 107 MMOL/L (ref 94–109)
CHOLEST SERPL-MCNC: 217 MG/DL
CO2 SERPL-SCNC: 30 MMOL/L (ref 20–32)
CO2 SERPL-SCNC: 31 MMOL/L (ref 20–32)
CREAT SERPL-MCNC: 0.73 MG/DL (ref 0.52–1.04)
CREAT SERPL-MCNC: 0.74 MG/DL (ref 0.52–1.04)
DIFFERENTIAL METHOD BLD: ABNORMAL
EOSINOPHIL # BLD AUTO: 0.4 10E9/L (ref 0–0.7)
EOSINOPHIL NFR BLD AUTO: 5.6 %
ERYTHROCYTE [DISTWIDTH] IN BLOOD BY AUTOMATED COUNT: 14.5 % (ref 10–15)
GFR SERPL CREATININE-BSD FRML MDRD: 82 ML/MIN/{1.73_M2}
GFR SERPL CREATININE-BSD FRML MDRD: 85 ML/MIN/{1.73_M2}
GLUCOSE SERPL-MCNC: 98 MG/DL (ref 70–99)
GLUCOSE SERPL-MCNC: 99 MG/DL (ref 70–99)
HCT VFR BLD AUTO: 36.8 % (ref 35–47)
HDLC SERPL-MCNC: 60 MG/DL
HGB BLD-MCNC: 11.5 G/DL (ref 11.7–15.7)
IMM GRANULOCYTES # BLD: 0.1 10E9/L (ref 0–0.4)
IMM GRANULOCYTES NFR BLD: 0.6 %
LDLC SERPL CALC-MCNC: 111 MG/DL
LYMPHOCYTES # BLD AUTO: 2 10E9/L (ref 0.8–5.3)
LYMPHOCYTES NFR BLD AUTO: 25 %
MCH RBC QN AUTO: 28.5 PG (ref 26.5–33)
MCHC RBC AUTO-ENTMCNC: 31.3 G/DL (ref 31.5–36.5)
MCV RBC AUTO: 91 FL (ref 78–100)
MONOCYTES # BLD AUTO: 0.6 10E9/L (ref 0–1.3)
MONOCYTES NFR BLD AUTO: 8 %
NEUTROPHILS # BLD AUTO: 4.8 10E9/L (ref 1.6–8.3)
NEUTROPHILS NFR BLD AUTO: 60 %
NONHDLC SERPL-MCNC: 157 MG/DL
NRBC # BLD AUTO: 0 10*3/UL
NRBC BLD AUTO-RTO: 0 /100
PLATELET # BLD AUTO: 291 10E9/L (ref 150–450)
POTASSIUM SERPL-SCNC: 3.2 MMOL/L (ref 3.4–5.3)
POTASSIUM SERPL-SCNC: 3.3 MMOL/L (ref 3.4–5.3)
PROT SERPL-MCNC: 7.2 G/DL (ref 6.8–8.8)
RBC # BLD AUTO: 4.04 10E12/L (ref 3.8–5.2)
SODIUM SERPL-SCNC: 140 MMOL/L (ref 133–144)
SODIUM SERPL-SCNC: 142 MMOL/L (ref 133–144)
TRIGL SERPL-MCNC: 229 MG/DL
WBC # BLD AUTO: 7.9 10E9/L (ref 4–11)

## 2021-05-07 PROCEDURE — 82306 VITAMIN D 25 HYDROXY: CPT | Performed by: PHARMACIST

## 2021-05-07 PROCEDURE — 80048 BASIC METABOLIC PNL TOTAL CA: CPT | Performed by: PHARMACIST

## 2021-05-07 PROCEDURE — 80061 LIPID PANEL: CPT | Performed by: PHARMACIST

## 2021-05-07 PROCEDURE — 85025 COMPLETE CBC W/AUTO DIFF WBC: CPT | Performed by: INTERNAL MEDICINE

## 2021-05-07 PROCEDURE — 80053 COMPREHEN METABOLIC PANEL: CPT | Performed by: INTERNAL MEDICINE

## 2021-05-07 RX ORDER — LISINOPRIL 40 MG/1
20 TABLET ORAL 2 TIMES DAILY
Qty: 90 TABLET | Refills: 3
Start: 2021-05-07 | End: 2021-10-21

## 2021-05-07 RX ORDER — AMLODIPINE BESYLATE 10 MG/1
5 TABLET ORAL 2 TIMES DAILY
Qty: 90 TABLET | Refills: 0
Start: 2021-05-07 | End: 2021-05-18 | Stop reason: DRUGHIGH

## 2021-05-07 NOTE — PATIENT INSTRUCTIONS
Recommendations from today's MTM visit:                                                       1. Change amlodipine to 5 mg twice daily and lisinopril to 20 mg twice daily.   2. Follow-up BMP, lipids and vitamin D.   3. Increase fish oil to 2 g daily.     Follow-up: Return for I will be in touch with labs return!.    It was great to speak with you today.  I value your experience and would be very thankful for your time with providing feedback on our clinic survey. You may receive a survey via email or text message in the next few days.     To schedule another MTM appointment, please call the clinic directly or you may call the MTM scheduling line at [phone] or toll-free at [phone].     My Clinical Pharmacist's contact information:                                                      Please feel free to contact me with any questions or concerns you have.      Kisha Heller, Pharm.D., [facility]  Medication Therapy Management Pharmacist  Page/VM:  [phone]

## 2021-05-09 LAB — DEPRECATED CALCIDIOL+CALCIFEROL SERPL-MC: 69 UG/L (ref 20–75)

## 2021-05-10 ENCOUNTER — INFUSION THERAPY VISIT (OUTPATIENT)
Dept: ONCOLOGY | Facility: CLINIC | Age: 71
End: 2021-05-10
Attending: INTERNAL MEDICINE
Payer: COMMERCIAL

## 2021-05-10 ENCOUNTER — MYC MEDICAL ADVICE (OUTPATIENT)
Dept: INTERNAL MEDICINE | Facility: CLINIC | Age: 71
End: 2021-05-10

## 2021-05-10 VITALS
WEIGHT: 163 LBS | OXYGEN SATURATION: 98 % | HEART RATE: 75 BPM | DIASTOLIC BLOOD PRESSURE: 88 MMHG | BODY MASS INDEX: 25.53 KG/M2 | SYSTOLIC BLOOD PRESSURE: 155 MMHG | TEMPERATURE: 98.1 F | RESPIRATION RATE: 16 BRPM

## 2021-05-10 DIAGNOSIS — E55.9 VITAMIN D DEFICIENCY: ICD-10-CM

## 2021-05-10 DIAGNOSIS — C50.912 MALIGNANT NEOPLASM OF LEFT BREAST IN FEMALE, ESTROGEN RECEPTOR POSITIVE, UNSPECIFIED SITE OF BREAST (H): ICD-10-CM

## 2021-05-10 DIAGNOSIS — Z79.811 AROMATASE INHIBITOR USE: ICD-10-CM

## 2021-05-10 DIAGNOSIS — M51.369 DDD (DEGENERATIVE DISC DISEASE), LUMBAR: ICD-10-CM

## 2021-05-10 DIAGNOSIS — M85.80 OSTEOPENIA, UNSPECIFIED LOCATION: ICD-10-CM

## 2021-05-10 DIAGNOSIS — G47.9 SLEEP DISORDER: ICD-10-CM

## 2021-05-10 DIAGNOSIS — C50.919 MALIGNANT NEOPLASM OF FEMALE BREAST, UNSPECIFIED ESTROGEN RECEPTOR STATUS, UNSPECIFIED LATERALITY, UNSPECIFIED SITE OF BREAST (H): Primary | ICD-10-CM

## 2021-05-10 DIAGNOSIS — Z79.811 LONG TERM (CURRENT) USE OF AROMATASE INHIBITORS: ICD-10-CM

## 2021-05-10 DIAGNOSIS — E78.00 PURE HYPERCHOLESTEROLEMIA: ICD-10-CM

## 2021-05-10 DIAGNOSIS — Z17.0 MALIGNANT NEOPLASM OF LEFT BREAST IN FEMALE, ESTROGEN RECEPTOR POSITIVE, UNSPECIFIED SITE OF BREAST (H): ICD-10-CM

## 2021-05-10 PROCEDURE — 258N000003 HC RX IP 258 OP 636: Performed by: INTERNAL MEDICINE

## 2021-05-10 PROCEDURE — 96365 THER/PROPH/DIAG IV INF INIT: CPT

## 2021-05-10 PROCEDURE — 250N000011 HC RX IP 250 OP 636: Performed by: INTERNAL MEDICINE

## 2021-05-10 PROCEDURE — 80061 LIPID PANEL: CPT | Performed by: INTERNAL MEDICINE

## 2021-05-10 PROCEDURE — 99214 OFFICE O/P EST MOD 30 MIN: CPT | Mod: GC | Performed by: INTERNAL MEDICINE

## 2021-05-10 RX ORDER — ZOLEDRONIC ACID 0.04 MG/ML
4 INJECTION, SOLUTION INTRAVENOUS ONCE
Status: COMPLETED | OUTPATIENT
Start: 2021-05-10 | End: 2021-05-10

## 2021-05-10 RX ADMIN — SODIUM CHLORIDE 250 ML: 9 INJECTION, SOLUTION INTRAVENOUS at 10:25

## 2021-05-10 RX ADMIN — ZOLEDRONIC ACID 4 MG: 0.04 INJECTION, SOLUTION INTRAVENOUS at 10:25

## 2021-05-10 ASSESSMENT — PAIN SCALES - GENERAL: PAINLEVEL: SEVERE PAIN (6)

## 2021-05-10 NOTE — PROGRESS NOTES
[facility] ER CANCER CLINIC  ONCOLOGY FOLLOW-UP VISIT NOTE    PATIENT NAME: Nadira Perez    YOB: 1950  MRN :[id number]  DATE OF VISIT: May 10, 2021     REASON FOR VISIT : breast cancer follow up.     HISTORY OF PRESENT ILLNESS : The patient is a 70-year-old woman who in 06/2007 had the onset of left-sided breast discomfort that extended into the axilla. She ultimately did undergo mammogram on 06/13/2007, which identified a mass at the 2:30 position in the left breast. Ultrasound also was notable for some skin and areolar complex thickening. She did undergo biopsy of the mass, and this was consistent with an infiltrating ductal carcinoma that was grade 2. The tumor was ER positive, AK positive, and HER2 negative by FISH. She also had a biopsy of left axillary lymph node versus the tail of the axilla, and this was consistent with metastatic carcinoma. Following the diagnosis the patient did have metastatic staging to include a PET CT scan. This demonstrated increased activity in the left breast as well as the lymph nodes, but was otherwise negative. There was also an MRI of the breast performed, and this demonstrated other lesions of the left breast, but no abnormalities on the right.     She initiated neoadjuvant chemotherapy for her inflammatory breast cancer on 07/13/2007. She received  for 3 cycles through 08/24/2007. This was followed by Taxotere for 3 cycles, which was administered 09/14/2007 through 10/26/2007. Following her chemotherapy she did undergo a left-sided mastectomy with axillary lymph node dissection on 11/20/2007 by Dr. Mauro Mei. The patient did have residual carcinoma with a 1.0 cm tumor as well as associated DCIS. Thirteen of 33 lymph nodes were positive, the largest of which was 0.6 cm of tumor infiltration and included extracapsular extension. The patient also had a right-sided prophylactic mastectomy, which did not demonstrate any noninvasive  "or invasive carcinoma. Just prior to her surgery Ismael was placed on Arimidex (start date 11/15/2007). Following her surgery she did have some issues with left chest wall cellulitis as well as some lymphedema of the left upper extremity. She ultimately did go on to receive radiation therapy under the direction of Dr. Nghia Burch. She received radiation to the left chest wall at a dose of 6440 cGy, to the left supraclavicular fossa at a dose of 5040 cGy, and to the left internal mammary chain at a dose of 5080 cGy. Last dose of radiation was administered on 03/07/2008.     INTERVAL HISTORY:  Nadira is here for routine visit.   Ms. Perez completed 10 years of endocrine therapy with Arimidex and then tamoxifen.  In 11/2017, she then went back on Arimidex for a prolonged extended therapy as per the MA17 trial.      She says she is overall tolerating it well.  She continues to have intermittent arthralgias worse in her knees.  She denies hot flashes or vaginal bleeding. She is using Rephresh for vaginal dryness but has not been recently engaging in intercourse.     She reports ongoing back pain related to her history of compression fractures. She takes oxycodone-acetaminophen maybe 10 tablets per month and occasional flexeril. She was doing physical therapy for this but \"graduated\" and now performs the exercises on her own. She primarily does stretching type exercises.     She is doing balance therapy and has not had any true falls in a couple months. She occasionally loses her balance and catches herself on the walls. She uses a walker outside the house. She denies headaches or new neurologic symptoms.    She reports noticing increased edema in her right foot which began some time after dropping a can of soup on her foot last November. She has some residual asymmetry in her upper extremities (left larger than right). No new skin nodules but she reports some changes to a mole in her right supraclavicular area she wants " "looked at by dermatology.     She retired and now is \"decluttering\" with plans to start a nonprofit.     No fevers or chills, no chest pain or shortness of breath, no nausea.       No new medical problems.  She continues to have intermittent neuropathy.      REVIEW OF SYSTEMS:  Her 10-point review of systems is otherwise negative.       PHYSICAL EXAMINATION:   BP (!) 155/88   Pulse 75   Temp 98.1  F (36.7  C) (Tympanic)   Resp 16   Wt 73.9 kg (163 lb)   SpO2 98%   BMI 25.53 kg/m    Wt Readings from Last 5 Encounters:   05/10/21 73.9 kg (163 lb)   10/22/20 78.2 kg (172 lb 6.4 oz)   09/11/20 79.8 kg (176 lb)   09/03/20 80.7 kg (178 lb)   08/10/20 80.7 kg (178 lb)     GENERAL : Alert and oriented , not in distress .   HEENT: Normocephalic head.  Anicteric sclerae.No oral ulceration. No nystagmus.    NECK: Supple, no thyromegaly.   LYMPH:  No cervical, supraclavicular, infraclavicular, or axillary lymphadenopathy.  BREAST: She is status post bilateral mastectomies without reconstruction. No chest wall mass or concerning lesions identified. Multiple chest telangectasias in the left side.  There are no dominant masses on palpation of the chest wall, along the mastectomy scars or into the axilla bilaterally.   LUNGS: Clear breath sounds bilaterally, no wheezing, no crackles.    CARDIOVASCULAR: S1 and S2 well heard, regular rate and rhythm, no murmur or gallop. JVP absent.    ABDOMEN: Soft, nontender  EXTREMITIES: 1+ bilateral lower extremity edema, symmetric.   SKIN: Telangiectasias noted as above. 0.5 cm    NEUROLOGIC: Normal mental status. Alert and oriented .Cranial nerves II through XII grossly intact.  No focal weakness. Sensory examination grossly intact. She was observed while she was walking and no significant abnormalities noted.           Lab Results   Component Value Date     05/07/2021     05/07/2021    Lab Results   Component Value Date    CHLORIDE 105 05/07/2021    CHLORIDE 107 05/07/2021    " Lab Results   Component Value Date    BUN 21 05/07/2021    BUN 22 05/07/2021      Lab Results   Component Value Date    POTASSIUM 3.2 05/07/2021    POTASSIUM 3.3 05/07/2021    Lab Results   Component Value Date    CO2 31 05/07/2021    CO2 30 05/07/2021    Lab Results   Component Value Date    CR 0.73 05/07/2021    CR 0.74 05/07/2021        Lab Results   Component Value Date    WBC 7.9 05/07/2021    HGB 11.5 (L) 05/07/2021    HCT 36.8 05/07/2021    MCV 91 05/07/2021     05/07/2021     Lab Results   Component Value Date    AST 18 05/07/2021    ALT 25 05/07/2021    ALKPHOS 70 05/07/2021    BILITOTAL 0.5 05/07/2021    BILICONJ 0.0 11/08/2012       ASSESSMENT AND PLAN:  1. Inflammatory breast cancer, stage IIIC, the left breast, ER positive, HER2 negative, status post neoadjuvant chemotherapy followed by bilateral mastectomies. Chest wall radiotherapy completed in 03/2008. She was on adjuvant Arimidex from November 2007 until November 2012, and then was switched to tamoxifen. She completed 10 years of adjuvant hormonal therapy in November 2017.  At that time, she was switched back to arimidex per MA 17 trial.  She is tolerating this well.    We discussed the risks and benefits of staying on anastrozole and will plan to re-evaluate this fall. Will continue for now.    2. Gait imbalance : Unchanged.  Normal brain MRI in November 2017.  Continue pilates and PT. Discussed minimizing use of potentially offending medications    3. Osteoporosis. She is on zometa yearly. Dexa this fall.     4. Vaginal dryness - on rephresh      5. Lower extremity edema: appears symmetric on exam today, working with pharmacy on optimizing BP regimen     6. Nevus, right supraclavicular: patient following up with dermatology    The patient was seen and assessed with staff, Dr. Louise.    Floyd Waddell MD  PGY-4 Radiation Oncology    HISTORY OF PRESENT ILLNESS:  The patient was seen and evaluated by me with Dr. Floyd Waddell.  I edited the above  note to reflect my evaluation.  Nadira has no signs or symptoms of recurrence today.  She has been on adjuvant endocrine therapy since 2008.    We discussed having her continue on anastrozole for 6 months.  I anticipate discontinuing her treatment at that time.  She did inquire about whether or not she should remain on it for 15 years total given the fact that in the MA17 trial, patients were on Arimidex for up to 10 years but had been allowed to be previously on tamoxifen prior to this.  We discussed that I would like her to get a new baseline bone density scan in the fall in order to have a good conversation about the pros and cons of remaining on antiestrogen therapy with a bone thinner.  She is in agreement.  At this time, we will plan as a result to see her back in the fall with a new bone density scan as well as currently to continue her Arimidex.    She does have known osteoporosis and is on Zometa and will receive this later today.    She does have pedal edema.  We discussed that Norvasc can sometimes cause pedal edema.  I advised that she follow up with her primary care provider.  There are no signs or symptoms of a DVT today.

## 2021-05-10 NOTE — PROGRESS NOTES
Infusion Nursing Note:  Nadira Perez presents today for zometa.    Patient seen by provider today: Yes: Dr Louise   present during visit today: Not Applicable.    Note: Patient reported back pain 6/10 today.  Hot packs applied during infusion visit, no other interventions for pain needed. Pain is ongoing and tolerable per patient     Intravenous Access:  Peripheral IV placed.    Treatment Conditions:  Lab Results   Component Value Date     05/07/2021     05/07/2021                   Lab Results   Component Value Date    POTASSIUM 3.2 05/07/2021    POTASSIUM 3.3 05/07/2021           Lab Results   Component Value Date    MAG 2.2 06/11/2009            Lab Results   Component Value Date    CR 0.73 05/07/2021    CR 0.74 05/07/2021                   Lab Results   Component Value Date    RAMBO 9.6 05/07/2021    RAMBO 9.5 05/07/2021                Lab Results   Component Value Date    BILITOTAL 0.5 05/07/2021           Lab Results   Component Value Date    ALBUMIN 4.2 05/07/2021                    Lab Results   Component Value Date    ALT 25 05/07/2021           Lab Results   Component Value Date    AST 18 05/07/2021       Results reviewed, labs MET treatment parameters, ok to proceed with treatment.  Patient states she is already on oral potassium at home and will try to increase her potassium intake       Post Infusion Assessment:  Patient tolerated infusion without incident.  Blood return noted pre and post infusion.  Site patent and intact, free from redness, edema or discomfort.  No evidence of extravasations.  Access discontinued per protocol.       Discharge Plan:   Patient declined prescription refills.  Discharge instructions reviewed with: Patient.  Patient and/or family verbalized understanding of discharge instructions and all questions answered.  Copy of AVS reviewed with patient and/or family.  Patient will return 11/8 for next appointment with Dr Louise.  Patient discharged in stable  condition accompanied by: self.  Departure Mode: Ambulatory.  Face to Face time: 0.    Becca Poon RN

## 2021-05-10 NOTE — PATIENT INSTRUCTIONS
[facility] Triage and after hours / weekends / holidays:  [phone]    Please call the triage or after hours line if you experience a temperature greater than or equal to 100.5, shaking chills, have uncontrolled nausea, vomiting and/or diarrhea, dizziness, shortness of breath, chest pain, bleeding, unexplained bruising, or if you have any other new/concerning symptoms, questions or concerns.      If you are having any concerning symptoms or wish to speak to a provider before your next infusion visit, please call your care coordinator or triage to notify them so we can adequately serve you.     If you need a refill on a narcotic prescription or other medication, please call before your infusion appointment.           May 2021      Krish Monday Tuesday Wednesday Thursday Friday Saturday                                 1       2     3     4    TELEPHONE VISIT RETURN   3:00 PM   (30 min.)   Kisha Heller RPH   [facility] 5     6     7    LAB  12:20 PM   (15 min.)   [facility] LAB ONLY   [facility] 8       9     10    RETURN   8:45 AM   (30 min.)   Khushbu Louise MD   [facility]  10:00 AM   (30 min.)    ONC INFUSION NURSE   [facility] 11     12     13    [facility] RETURN HAND   8:15 AM   (10 min.)   Lien Prajapati MD   [facility]    XR FLUORO TIME 0/1 HOUR   8:30 AM   (60 min.)   [email or web address]   [facility] 14     15       16     17     18     19     20     21     22       23     24     25     26     27     28     29       30     31                                          June 2021 Sunday Monday Tuesday Wednesday Thursday Friday Saturday             1     2     3     4     5       6     7     8     9     10     11     12       13     14     15     16     17     18     19       20     21     22      23     24     25     26       27     28  Happy Birthday!     29     30    [facility] RETURN RETINA   1:15 PM   (15 min.)   Felix Carlos MD   [facility]                             No results found for this or any previous visit (from the past 24 hour(s)).

## 2021-05-10 NOTE — LETTER
5/10/2021         RE: Nadira Perez  [id number]  [place] [facility] [address]        Dear Colleague,    Thank you for referring your patient, Nadira Perez, to the [facility]. Please see a copy of my visit note below.    [facility]  ONCOLOGY FOLLOW-UP VISIT NOTE    PATIENT NAME: Nadira Perez    YOB: 1950  MRN :[id number]  DATE OF VISIT: May 10, 2021     REASON FOR VISIT : breast cancer follow up.   HISTORY OF PRESENT ILLNESS : The patient is a 70-year-old woman who in 06/2007 had the onset of left-sided breast discomfort that extended into the axilla. She ultimately did undergo mammogram on 06/13/2007, which identified a mass at the 2:30 position in the left breast. Ultrasound also was notable for some skin and areolar complex thickening. She did undergo biopsy of the mass, and this was consistent with an infiltrating ductal carcinoma that was grade 2. The tumor was ER positive, MD positive, and HER2 negative by FISH. She also had a biopsy of left axillary lymph node versus the tail of the axilla, and this was consistent with metastatic carcinoma. Following the diagnosis the patient did have metastatic staging to include a PET CT scan. This demonstrated increased activity in the left breast as well as the lymph nodes, but was otherwise negative. There was also an MRI of the breast performed, and this demonstrated other lesions of the left breast, but no abnormalities on the right.     She initiated neoadjuvant chemotherapy for her inflammatory breast cancer on 07/13/2007. She received  for 3 cycles through 08/24/2007. This was followed by Taxotere for 3 cycles, which was administered 09/14/2007 through 10/26/2007. Following her chemotherapy she did undergo a left-sided mastectomy with axillary lymph node dissection on 11/20/2007 by Dr. Mauro Mei. The patient did have residual carcinoma with a 1.0 cm tumor as  "well as associated DCIS. Thirteen of 33 lymph nodes were positive, the largest of which was 0.6 cm of tumor infiltration and included extracapsular extension. The patient also had a right-sided prophylactic mastectomy, which did not demonstrate any noninvasive or invasive carcinoma. Just prior to her surgery Ismael was placed on Arimidex (start date 11/15/2007). Following her surgery she did have some issues with left chest wall cellulitis as well as some lymphedema of the left upper extremity. She ultimately did go on to receive radiation therapy under the direction of Dr. Nghia Burch. She received radiation to the left chest wall at a dose of 6440 cGy, to the left supraclavicular fossa at a dose of 5040 cGy, and to the left internal mammary chain at a dose of 5080 cGy. Last dose of radiation was administered on 03/07/2008.     INTERVAL HISTORY:  Nadira is here for routine visit.   Ms. Perez completed 10 years of endocrine therapy with Arimidex and then tamoxifen.  In 11/2017, she then went back on Arimidex for a prolonged extended therapy as per the MA17 trial.      She says she is overall tolerating it well.  She continues to have intermittent arthralgias worse in her knees.  She denies hot flashes or vaginal bleeding. She is using Rephresh for vaginal dryness but has not been recently engaging in intercourse.     She reports ongoing back pain related to her history of compression fractures. She takes oxycodone-acetaminophen maybe 10 tablets per month and occasional flexeril. She was doing physical therapy for this but \"graduated\" and now performs the exercises on her own. She primarily does stretching type exercises.     She is doing balance therapy and has not had any true falls in a couple months. She occasionally loses her balance and catches herself on the walls. She uses a walker outside the house. She denies headaches or new neurologic symptoms.    She reports noticing increased edema in her right foot " "which began some time after dropping a can of soup on her foot last November. She has some residual asymmetry in her upper extremities (left larger than right). No new skin nodules but she reports some changes to a mole in her right supraclavicular area she wants looked at by dermatology.     She retired and now is \"decluttering\" with plans to start a nonprofit.     No fevers or chills, no chest pain or shortness of breath, no nausea.       No new medical problems.  She continues to have intermittent neuropathy.      REVIEW OF SYSTEMS:  Her 10-point review of systems is otherwise negative.       PHYSICAL EXAMINATION:   BP (!) 155/88   Pulse 75   Temp 98.1  F (36.7  C) (Tympanic)   Resp 16   Wt 73.9 kg (163 lb)   SpO2 98%   BMI 25.53 kg/m    Wt Readings from Last 5 Encounters:   05/10/21 73.9 kg (163 lb)   10/22/20 78.2 kg (172 lb 6.4 oz)   09/11/20 79.8 kg (176 lb)   09/03/20 80.7 kg (178 lb)   08/10/20 80.7 kg (178 lb)     GENERAL : Alert and oriented , not in distress .   HEENT: Normocephalic head.  Anicteric sclerae.No oral ulceration. No nystagmus.    NECK: Supple, no thyromegaly.   LYMPH:  No cervical, supraclavicular, infraclavicular, or axillary lymphadenopathy.  BREAST: She is status post bilateral mastectomies without reconstruction. No chest wall mass or concerning lesions identified. Multiple chest telangectasias in the left side.  There are no dominant masses on palpation of the chest wall, along the mastectomy scars or into the axilla bilaterally.   LUNGS: Clear breath sounds bilaterally, no wheezing, no crackles.    CARDIOVASCULAR: S1 and S2 well heard, regular rate and rhythm, no murmur or gallop. JVP absent.    ABDOMEN: Soft, nontender  EXTREMITIES: 1+ bilateral lower extremity edema, symmetric.   SKIN: Telangiectasias noted as above. 0.5 cm    NEUROLOGIC: Normal mental status. Alert and oriented .Cranial nerves II through XII grossly intact.  No focal weakness. Sensory examination grossly " intact. She was observed while she was walking and no significant abnormalities noted.           Lab Results   Component Value Date     05/07/2021     05/07/2021    Lab Results   Component Value Date    CHLORIDE 105 05/07/2021    CHLORIDE 107 05/07/2021    Lab Results   Component Value Date    BUN 21 05/07/2021    BUN 22 05/07/2021      Lab Results   Component Value Date    POTASSIUM 3.2 05/07/2021    POTASSIUM 3.3 05/07/2021    Lab Results   Component Value Date    CO2 31 05/07/2021    CO2 30 05/07/2021    Lab Results   Component Value Date    CR 0.73 05/07/2021    CR 0.74 05/07/2021        Lab Results   Component Value Date    WBC 7.9 05/07/2021    HGB 11.5 (L) 05/07/2021    HCT 36.8 05/07/2021    MCV 91 05/07/2021     05/07/2021     Lab Results   Component Value Date    AST 18 05/07/2021    ALT 25 05/07/2021    ALKPHOS 70 05/07/2021    BILITOTAL 0.5 05/07/2021    BILICONJ 0.0 11/08/2012       ASSESSMENT AND PLAN:  1. Inflammatory breast cancer, stage IIIC, the left breast, ER positive, HER2 negative, status post neoadjuvant chemotherapy followed by bilateral mastectomies. Chest wall radiotherapy completed in 03/2008. She was on adjuvant Arimidex from November 2007 until November 2012, and then was switched to tamoxifen. She completed 10 years of adjuvant hormonal therapy in November 2017.  At that time, she was switched back to arimidex per MA 17 trial.  She is tolerating this well.    We discussed the risks and benefits of staying on anastrozole and will plan to re-evaluate this fall. Will continue for now.    2. Gait imbalance : Unchanged.  Normal brain MRI in November 2017.  Continue pilates and PT. Discussed minimizing use of potentially offending medications    3. Osteoporosis. She is on zometa yearly. Dexa this fall.     4. Vaginal dryness - on rephresh      5. Lower extremity edema: appears symmetric on exam today, working with pharmacy on optimizing BP regimen     6. Nevus, right  supraclavicular: patient following up with dermatology    The patient was seen and assessed with staff, Dr. Louise.    Floyd Waddell MD  PGY-4 Radiation Oncology    HISTORY OF PRESENT ILLNESS:  The patient was seen and evaluated by me with Dr. Floyd Waddell.  I edited the above note to reflect my evaluation.  Nadira has no signs or symptoms of recurrence today.  She has been on adjuvant endocrine therapy since 2008.    We discussed having her continue on anastrozole for 6 months.  I anticipate discontinuing her treatment at that time.  She did inquire about whether or not she should remain on it for 15 years total given the fact that in the MA17 trial, patients were on Arimidex for up to 10 years but had been allowed to be previously on tamoxifen prior to this.  We discussed that I would like her to get a new baseline bone density scan in the fall in order to have a good conversation about the pros and cons of remaining on antiestrogen therapy with a bone thinner.  She is in agreement.  At this time, we will plan as a result to see her back in the fall with a new bone density scan as well as currently to continue her Arimidex.    She does have known osteoporosis and is on Zometa and will receive this later today.    She does have pedal edema.  We discussed that Norvasc can sometimes cause pedal edema.  I advised that she follow up with her primary care provider.  There are no signs or symptoms of a DVT today.    Again, thank you for allowing me to participate in the care of your patient.        Sincerely,        Khushbu Louise MD

## 2021-05-10 NOTE — NURSING NOTE
"Oncology Rooming Note    May 10, 2021 9:08 AM   Nadira Perez is a 70 year old female who presents for:    Chief Complaint   Patient presents with     Oncology Clinic Visit     BREAST CANCER      Initial Vitals: BP (!) 155/88   Pulse 75   Temp 98.1  F (36.7  C) (Tympanic)   Resp 16   Wt 73.9 kg (163 lb)   SpO2 98%   BMI 25.53 kg/m   Estimated body mass index is 25.53 kg/m  as calculated from the following:    Height as of 9/11/20: 1.702 m (5' 7\").    Weight as of this encounter: 73.9 kg (163 lb). Body surface area is 1.87 meters squared.  Severe Pain (6) Comment: Data Unavailable   No LMP recorded. Patient has had a hysterectomy.  Allergies reviewed: Yes  Medications reviewed: Yes    Medications: Medication refills not needed today.  Pharmacy name entered into [employer]:    [facility]  [id number] [place], [place] - [address]  [id number] [place], [place] - [address]    Clinical concerns: None.       Lorena Harkins MA            "

## 2021-05-11 RX ORDER — LORAZEPAM 2 MG/1
TABLET ORAL
Qty: 30 TABLET | Refills: 1 | OUTPATIENT
Start: 2021-05-11

## 2021-05-11 RX ORDER — HYDROCODONE BITARTRATE AND ACETAMINOPHEN 5; 325 MG/1; MG/1
1 TABLET ORAL EVERY 6 HOURS PRN
Qty: 40 TABLET | Refills: 0 | OUTPATIENT
Start: 2021-05-11

## 2021-05-11 NOTE — TELEPHONE ENCOUNTER
Patient Requested  HYDROcodone-acetaminophen (NORCO) 5-325 MG tablet  Last Filled  01/05/2021  Last Office Visit  10/22/2020  Next Office Visit     Checked  05/11/2021    DX:     Pharmacy:     TAMARA PERRY CMA at 8:20 AM on 5/11/2021.

## 2021-05-13 ENCOUNTER — ANCILLARY PROCEDURE (OUTPATIENT)
Dept: GENERAL RADIOLOGY | Facility: CLINIC | Age: 71
End: 2021-05-13
Attending: ORTHOPAEDIC SURGERY
Payer: OTHER MISCELLANEOUS

## 2021-05-13 ENCOUNTER — OFFICE VISIT (OUTPATIENT)
Dept: ORTHOPEDICS | Facility: CLINIC | Age: 71
End: 2021-05-13
Payer: OTHER MISCELLANEOUS

## 2021-05-13 DIAGNOSIS — M25.531 RIGHT WRIST PAIN: ICD-10-CM

## 2021-05-13 DIAGNOSIS — M19.131 POST-TRAUMATIC OSTEOARTHRITIS OF RIGHT WRIST: ICD-10-CM

## 2021-05-13 DIAGNOSIS — M19.132 POST-TRAUMATIC OSTEOARTHRITIS OF LEFT WRIST: Primary | ICD-10-CM

## 2021-05-13 DIAGNOSIS — M25.532 LEFT WRIST PAIN: ICD-10-CM

## 2021-05-13 PROCEDURE — 99207 PR DROP WITH A PROCEDURE: CPT | Performed by: ORTHOPAEDIC SURGERY

## 2021-05-13 PROCEDURE — 76000 FLUOROSCOPY <1 HR PHYS/QHP: CPT | Performed by: RADIOLOGY

## 2021-05-13 PROCEDURE — 20605 DRAIN/INJ JOINT/BURSA W/O US: CPT | Mod: 50 | Performed by: ORTHOPAEDIC SURGERY

## 2021-05-13 PROCEDURE — 77002 NEEDLE LOCALIZATION BY XRAY: CPT | Performed by: ORTHOPAEDIC SURGERY

## 2021-05-13 RX ORDER — LIDOCAINE HYDROCHLORIDE 10 MG/ML
2 INJECTION, SOLUTION EPIDURAL; INFILTRATION; INTRACAUDAL; PERINEURAL
Status: DISCONTINUED | OUTPATIENT
Start: 2021-05-13 | End: 2021-11-03

## 2021-05-13 RX ORDER — TRIAMCINOLONE ACETONIDE 40 MG/ML
40 INJECTION, SUSPENSION INTRA-ARTICULAR; INTRAMUSCULAR
Status: DISCONTINUED | OUTPATIENT
Start: 2021-05-13 | End: 2021-11-03

## 2021-05-13 RX ADMIN — LIDOCAINE HYDROCHLORIDE 2 ML: 10 INJECTION, SOLUTION EPIDURAL; INFILTRATION; INTRACAUDAL; PERINEURAL at 09:37

## 2021-05-13 RX ADMIN — TRIAMCINOLONE ACETONIDE 40 MG: 40 INJECTION, SUSPENSION INTRA-ARTICULAR; INTRAMUSCULAR at 09:37

## 2021-05-13 NOTE — LETTER
5/13/2021         RE: Nadira Perez  [address]  [place] [address]        Dear Colleague,    Thank you for referring your patient, Nadira Perez, to the [facility] [place]. Please see a copy of my visit note below.    ORTHOPEDIC HAND SURGERY FOLLOW UP VISIT    REASON FOR VISIT: Follow up bilateral radiocarpal arthritis    SUBJECTIVE:  Nadira Perez returns to for follow up of bilateral radiocarpal arthritis. Last seen 10/29/2020 for bilateral corticosteroid injections. Good relief for 6-9 months.    Scaphoid excisions and 4 corner fusions performed on right in 2013 and on left in 2011 by Dr. Mendez.    Here for repeat injections today. Feels occasional clicking/locking in her left wrist. Occasionally uses ibuprofen for pain control. Recently retired from her job at [employer].    OBJECTIVE:  Exam  There were no vitals taken for this visit.   Gen: awake, alert, no acute distress  Resp: NLB on RA  CV: Skin wwp  Right Upper Extremity  - Skin intact. Well healed surgical scars.  - Palpable radial pulse  - Fires epl, fpl, io  - Swelling and tenderness at radiocarpal joint    Left Upper Extremity  - Skin intact. Well healed surgical scars.  - Palpable radial pulse  - Fires epl, fpl, io  - Swelling and tenderness at radiocarpal joint    IMAGING:  None new.    Bilateral Hand XR from 2020 reviewed and show bilateral scaphoid excisions and 4 corner fusions. Dorsal screw at radiocarpal joint on the left appears to be loose.    ASSESSMENT:  70 year old female with bilateral radiocarpal osteoarthritis and history of bilateral scaphoid excisions and 4 corner fusions with Dr. Mendez.    Discussed ongoing management for bilateral radiocarpal osteoarthritis. She would like to continue with corticosteroid injections today as these provide 6-9 months of relief. Risks, benefits, complications of procedure discussed. Written consent obtained.    PLAN:  - Bilateral corticosteroid  injections today to radiocarpal joints  - Follow up in 6 months for repeat evaluation    PROCEDURE:  After written consent, verifying site and side, a sterile Chlora- prep was performed for the injection site the procedure was done on the right side and then again on the left side. C-arm guidance was used for placement of a 25-gauge needle into the dorsal wrist joint with 1% Lidocaine.  3 mL of Kenalog (40 mg) on the right and 2 ml of Kenalog on the right and 3 mL of lidocaine was injected under fluoroscopic guidance with good relief of pain. Patient tolerated the procedure well.  No complications.  Follow up instructions were given.      The patient was seen and discussed with Dr. Prajapati.    Sunny Llanes MD  PGY-4  Orthopaedic Surgery    I have personally examined this patient and have reviewed the clinical presentation and progress note with the resident. I agree with the treatment plan as outlined. The plan was formulated with the resident on the day of the resident's dictation.   Lien Prajapati MD   Hand and Upper Extremity Specialist  [facility]        Medium Joint Injection/Arthrocentesis: bilateral radiocarpal    Date/Time: 5/13/2021 9:37 AM  Performed by: Lien Prajapati MD  Authorized by: Lien Prajapati MD     Needle Size:  25 G  Guidance: fluoroscopy    Location:  Wrist  Laterality:  Bilateral  Site:  Bilateral radiocarpal  Medications (Right):  40 mg triamcinolone 40 MG/ML; 2 mL lidocaine (PF) 1 %  Medications (Left):  40 mg triamcinolone 40 MG/ML; 2 mL lidocaine (PF) 1 %  Outcome:  Tolerated well, no immediate complications  Procedure discussed: discussed risks, benefits, and alternatives    Consent Given by:  Patient  Timeout: timeout called immediately prior to procedure    Prep: patient was prepped and draped in usual sterile fashion        [facility] [address]  [address]  [phone]  Dept: [phone]  ______________________________________________________________________________    Patient: Nadira Perez   : 1950   MRN: [id number]   May 13, 2021    INVASIVE PROCEDURE SAFETY CHECKLIST    Date: 2021   Procedure: Bilateral wrist radiocarpal steroid injections  Patient Name: Nadira Perez  MRN: [id number]  YOB: 1950    Action: Complete sections as appropriate. Any discrepancy results in a HARD COPY until resolved.     PRE PROCEDURE:  Patient ID verified with 2 identifiers (name and  or MRN): Yes  Procedure and site verified with patient/designee (when able): Yes  Accurate consent documentation in medical record: Yes  H&P (or appropriate assessment) documented in medical record: Yes  H&P must be up to 20 days prior to procedure and updates within 24 hours of procedure as applicable: Yes  Relevant diagnostic and radiology test results appropriately labeled and displayed as applicable: Yes  Procedure site(s) marked with provider initials: Yes    TIMEOUT:  Time-Out performed immediately prior to starting procedure, including verbal and active participation of all team members addressing the following:Yes  * Correct patient identify  * Confirmed that the correct side and site are marked  * An accurate procedure consent form  * Agreement on the procedure to be done  * Correct patient position  * Relevant images and results are properly labeled and appropriately displayed  * The need to administer antibiotics or fluids for irrigation purposes during the procedure as applicable   * Safety precautions based on patient history or medication use    DURING PROCEDURE: Verification of correct person, site, and procedures any time the responsibility for care of the patient is transferred to another member of the care team.       The following medications were given:         Prior to injection, verified patient identity using patient's name and  date of birth.  Due to injection administration, patient instructed to remain in clinic for 15 minutes  afterwards, and to report any adverse reaction to me immediately.    Joint injection was performed.    Medication Name: Kenalog NDC: [address]-8  Drug Amount Wasted:  None.  Vial/Syringe: Single dose vial  Expiration Date:  10/1/22    Medication Name: Lidocaine NDC: 66437-147-95    Scribed by Della Mccall ATC for Dr. Prajapati on May 13, 2021 at 9:15 am based on the provider's statements to me.     Della Mccall ATC

## 2021-05-13 NOTE — PROGRESS NOTES
Medium Joint Injection/Arthrocentesis: bilateral radiocarpal    Date/Time: 2021 9:37 AM  Performed by: Lien Prajapati MD  Authorized by: Lien Prajapati MD     Needle Size:  25 G  Guidance: fluoroscopy    Location:  Wrist  Laterality:  Bilateral  Site:  Bilateral radiocarpal  Medications (Right):  40 mg triamcinolone 40 MG/ML; 2 mL lidocaine (PF) 1 %  Medications (Left):  40 mg triamcinolone 40 MG/ML; 2 mL lidocaine (PF) 1 %  Outcome:  Tolerated well, no immediate complications  Procedure discussed: discussed risks, benefits, and alternatives    Consent Given by:  Patient  Timeout: timeout called immediately prior to procedure    Prep: patient was prepped and draped in usual sterile fashion        [facility] [address] [address]  [phone]  Dept: [phone]  ______________________________________________________________________________    Patient: Nadira Perez   : 1950   MRN: [id number]   May 13, 2021    INVASIVE PROCEDURE SAFETY CHECKLIST    Date: 2021   Procedure: Bilateral wrist radiocarpal steroid injections  Patient Name: Nadira Perez  MRN: [id number]  YOB: 1950    Action: Complete sections as appropriate. Any discrepancy results in a HARD COPY until resolved.     PRE PROCEDURE:  Patient ID verified with 2 identifiers (name and  or MRN): Yes  Procedure and site verified with patient/designee (when able): Yes  Accurate consent documentation in medical record: Yes  H&P (or appropriate assessment) documented in medical record: Yes  H&P must be up to 20 days prior to procedure and updates within 24 hours of procedure as applicable: Yes  Relevant diagnostic and radiology test results appropriately labeled and displayed as applicable: Yes  Procedure site(s) marked with provider initials: Yes    TIMEOUT:  Time-Out performed immediately prior to starting procedure,  including verbal and active participation of all team members addressing the following:Yes  * Correct patient identify  * Confirmed that the correct side and site are marked  * An accurate procedure consent form  * Agreement on the procedure to be done  * Correct patient position  * Relevant images and results are properly labeled and appropriately displayed  * The need to administer antibiotics or fluids for irrigation purposes during the procedure as applicable   * Safety precautions based on patient history or medication use    DURING PROCEDURE: Verification of correct person, site, and procedures any time the responsibility for care of the patient is transferred to another member of the care team.       The following medications were given:         Prior to injection, verified patient identity using patient's name and date of birth.  Due to injection administration, patient instructed to remain in clinic for 15 minutes  afterwards, and to report any adverse reaction to me immediately.    Joint injection was performed.    Medication Name: Kenalog NDC: [address]-1  Drug Amount Wasted:  None.  Vial/Syringe: Single dose vial  Expiration Date:  10/1/22    Medication Name: Lidocaine NDC: 92177-389-11    Scribed by Della Mccall ATC for Dr. Prajapati on May 13, 2021 at 9:15 am based on the provider's statements to me.     Della Mccall ATC

## 2021-05-13 NOTE — PROGRESS NOTES
ORTHOPEDIC HAND SURGERY FOLLOW UP VISIT    REASON FOR VISIT: Follow up bilateral radiocarpal arthritis    SUBJECTIVE:  Nadira Perez returns to for follow up of bilateral radiocarpal arthritis. Last seen 10/29/2020 for bilateral corticosteroid injections. Good relief for 6-9 months.    Scaphoid excisions and 4 corner fusions performed on right in 2013 and on left in 2011 by Dr. Mendez.    Here for repeat injections today. Feels occasional clicking/locking in her left wrist. Occasionally uses ibuprofen for pain control. Recently retired from her job at [email or web address].    OBJECTIVE:  Exam  There were no vitals taken for this visit.   Gen: awake, alert, no acute distress  Resp: NLB on RA  CV: Skin wwp  Right Upper Extremity  - Skin intact. Well healed surgical scars.  - Palpable radial pulse  - Fires epl, fpl, io  - Swelling and tenderness at radiocarpal joint    Left Upper Extremity  - Skin intact. Well healed surgical scars.  - Palpable radial pulse  - Fires epl, fpl, io  - Swelling and tenderness at radiocarpal joint    IMAGING:  None new.    Bilateral Hand XR from 2020 reviewed and show bilateral scaphoid excisions and 4 corner fusions. Dorsal screw at radiocarpal joint on the left appears to be loose.    ASSESSMENT:  70 year old female with bilateral radiocarpal osteoarthritis and history of bilateral scaphoid excisions and 4 corner fusions with Dr. Mendez.    Discussed ongoing management for bilateral radiocarpal osteoarthritis. She would like to continue with corticosteroid injections today as these provide 6-9 months of relief. Risks, benefits, complications of procedure discussed. Written consent obtained.    PLAN:  - Bilateral corticosteroid injections today to radiocarpal joints  - Follow up in 6 months for repeat evaluation    PROCEDURE:  After written consent, verifying site and side, a sterile Chlora- prep was performed for the injection site the procedure was done on the right side and then again on the  left side. C-arm guidance was used for placement of a 25-gauge needle into the dorsal wrist joint with 1% Lidocaine.  3 mL of Kenalog (40 mg) on the right and 2 ml of Kenalog on the right and 3 mL of lidocaine was injected under fluoroscopic guidance with good relief of pain. Patient tolerated the procedure well.  No complications.  Follow up instructions were given.      The patient was seen and discussed with Dr. Prajapati.    Sunny Llanes MD  PGY-4  Orthopaedic Surgery    I have personally examined this patient and have reviewed the clinical presentation and progress note with the resident. I agree with the treatment plan as outlined. The plan was formulated with the resident on the day of the resident's dictation.   Lien Prajapati MD   Hand and Upper Extremity Specialist  [facility]

## 2021-05-13 NOTE — NURSING NOTE
Reason For Visit:   Chief Complaint   Patient presents with     RECHECK     Bilateral wrist pain - injections; last injection 10/29/2020       Primary MD: Matilde Quiñonez    Age: 70 year old    ?  No      There were no vitals taken for this visit.      Pain Assessment  Patient Currently in Pain: Yes  0-10 Pain Scale: (4/10 left, 6/10 right)               QuickDASH Assessment  QuickDASH Main 12/22/2016   1.Open a tight or new jar. Severe difficulty   2. Do heavy household chores (e.g., wash walls, floors) Moderate difficulty   3. Carry a shopping bag or briefcase. Moderate difficulty   4. Wash your back. Mild difficulty   5. Use a knife to cut food. Mild difficulty   6. Recreational activities in which you take some force or impact through your arm, shoulder or hand (e.g., golf, hammering, tennis, etc.). Unable   7. During the past week, to what extent has your arm, shoulder or hand problem interfered with your normal social activities with family, friends, neighbours or groups? Slightly   8. During the past week, were you limited in your work or other regular daily activities as a result of your arm, shoulder or hand problem? Slightly limited   9. Arm, shoulder or hand pain. Moderate   10.Tingling (pins and needles) in your arm,shoulder or hand. None   11. During the past week, how much difficulty have you had sleeping because of the pain in your arm, shoulder or hand? ([place] number) No difficulty   Quickdash Ability Score 38.63   1. Open a tight or new jar. -   2. Do heavy household chores (e.g., wash walls, floors). -   3. Carry a shopping bag or briefcase. -   4. Wash your back. -   5. Use a knife to cut food. -   6. Recreational activities in which you take some force or impact through your arm, shoulder or hand (e.g., golf, hammering, tennis, etc.). -   7. During the past week, to what extent has your arm, shoulder or hand problem interfered with your normal social activities with family,  friends, neighbours or groups? -   8. During the past week, were you limited in your work or other regular daily activities as a result of your arm, shoulder or hand problem? -   9. Arm, shoulder or hand pain. -   10. Tingling (pins and needles) in your arm,shoulder or hand. -   11. During the past week, how much difficulty have you had sleeping because of the pain in your arm, shoulder or hand? ([place] number) -   SUM: -          Current Outpatient Medications   Medication Sig Dispense Refill     Acetaminophen (APAP 500 PO) Take 1,000 mg by mouth 3 times daily as needed        amLODIPine (NORVASC) 10 MG tablet Take 0.5 tablets (5 mg) by mouth 2 times daily 90 tablet 0     anastrozole (ARIMIDEX) 1 MG tablet Take 1 tablet (1 mg) by mouth daily 90 tablet 3     Ascorbic Acid (VITAMIN C) 500 MG CHEW Take 1 tablet by mouth daily       atorvastatin (LIPITOR) 80 MG tablet Take 1 tablet (80 mg) by mouth daily 90 tablet 3     cholecalciferol 2000 units CAPS Take 6,000 Units by mouth daily 270 capsule 3     CRANBERRY EXTRACT PO Take 650 mg by mouth daily       cyclobenzaprine (FLEXERIL) 10 MG tablet Take 1 tablet (10 mg) by mouth 3 times daily as needed for muscle spasms 40 tablet 3     gabapentin (NEURONTIN) 300 MG capsule Take 1-2 capsules by mouth every 8 hours 540 capsule 3     HYDROcodone-acetaminophen (NORCO) 5-325 MG tablet Take 1 tablet by mouth every 6 hours as needed for severe pain 40 tablet 0     ibuprofen (ADVIL/MOTRIN) 200 MG tablet Take 600 mg by mouth every 8 hours as needed for moderate pain        levothyroxine (SYNTHROID/LEVOTHROID) 112 MCG tablet TAKE 1/2 TABLET BY MOUTH DAILY 45 tablet 3     lisinopril (ZESTRIL) 40 MG tablet Take 0.5 tablets (20 mg) by mouth 2 times daily 90 tablet 3     LORazepam (ATIVAN) 2 MG tablet Take 1 tablet at night for sleep 30 tablet 1     Magnesium Oxide 250 MG TABS Take 1 tablet by mouth daily       melatonin 5 MG tablet Take 10 mg by mouth daily       metoclopramide (REGLAN)  5 MG tablet Take 1 tablet (5 mg) by mouth 3 times daily as needed (nausea) Avoid taking on a daily basis (Patient not taking: Reported on 5/4/2021) 30 tablet 3     metoprolol succinate ER (TOPROL-XL) 50 MG 24 hr tablet Take 1 tablet (50 mg) by mouth daily 90 tablet 3     Multiple Vitamin (MULTI-VITAMIN) per tablet Take 1 tablet by mouth daily.       Omega-3 Fatty Acids (OMEGA-3 FISH OIL PO) Take 2 g by mouth daily        ondansetron (ZOFRAN-ODT) 4 MG ODT tab Take 1 tablet (4 mg) by mouth every 12 hours as needed for nausea (Patient not taking: Reported on 5/4/2021) 180 tablet 3     potassium chloride ER (KLOR-CON M) 20 MEQ CR tablet Take 20 meq  Three times a week. 36 tablet 3     terbinafine (LAMISIL AT) 1 % external cream Apply topically 2 times daily For fungal infection not resolved with other antifungals (e.g. Clotrimazole) 24 g 11     tiZANidine (ZANAFLEX) 2 MG tablet Take 1-2 tablets (2-4 mg) by mouth 3 times daily as needed for muscle spasms 30 tablet 1     triamcinolone (KENALOG) 0.1 % external ointment Apply topically 2 times daily 30 g 11     triamterene-HCTZ (MAXZIDE-25) 37.5-25 MG tablet Take 1 tablet by mouth daily 90 tablet 3     Vaginal Lubricant (REPHRESH) GEL Place 2 g vaginally every 3 days 14 Box 3     VOLTAREN 1 % topical gel Apply 4 grams to knees or 2 grams to hands four times daily using enclosed dosing card. 100 g 1       Allergies   Allergen Reactions     Penicillins Hives       Della Mccall, ATC

## 2021-05-18 ENCOUNTER — VIRTUAL VISIT (OUTPATIENT)
Dept: INTERNAL MEDICINE | Facility: CLINIC | Age: 71
End: 2021-05-18
Payer: COMMERCIAL

## 2021-05-18 DIAGNOSIS — E87.6 HYPOPOTASSEMIA: ICD-10-CM

## 2021-05-18 DIAGNOSIS — E03.8 OTHER SPECIFIED HYPOTHYROIDISM: ICD-10-CM

## 2021-05-18 DIAGNOSIS — I10 ESSENTIAL HYPERTENSION, BENIGN: Primary | ICD-10-CM

## 2021-05-18 DIAGNOSIS — R73.09 ELEVATED HEMOGLOBIN A1C: ICD-10-CM

## 2021-05-18 DIAGNOSIS — M51.369 DDD (DEGENERATIVE DISC DISEASE), LUMBAR: ICD-10-CM

## 2021-05-18 PROCEDURE — 99214 OFFICE O/P EST MOD 30 MIN: CPT | Mod: 95 | Performed by: NURSE PRACTITIONER

## 2021-05-18 RX ORDER — AMLODIPINE BESYLATE 5 MG/1
5 TABLET ORAL 2 TIMES DAILY
Qty: 180 TABLET | Refills: 3 | Status: SHIPPED | OUTPATIENT
Start: 2021-05-18 | End: 2021-06-16 | Stop reason: SINTOL

## 2021-05-18 RX ORDER — HYDROCODONE BITARTRATE AND ACETAMINOPHEN 5; 325 MG/1; MG/1
1 TABLET ORAL EVERY 6 HOURS PRN
Qty: 40 TABLET | Refills: 0 | Status: SHIPPED | OUTPATIENT
Start: 2021-05-18 | End: 2021-09-23

## 2021-05-18 NOTE — NURSING NOTE
Chief Complaint   Patient presents with     Medication Follow-up     pt is following up on medication.      Video Visit Technology for this patient: Harpreet not working, patient has smart device, please try Doximity Video with patient     Giselle Giron LPN at 8:46 AM on 5/18/2021.

## 2021-05-18 NOTE — PATIENT INSTRUCTIONS
[facility] Medication Refill Request Information:  * Please contact your pharmacy regarding ANY request for medication refills.  ** [facility] Prescription Fax = [phone]  * Please allow 3 business days for routine medication refills.  * Please allow 5 business days for controlled substance medication refills.     [facility] Test Result notification information:  *You will be notified with in 7-10 days of your appointment day regarding the results of your test.  If you are on MyChart you will be notified as soon as the provider has reviewed the results and signed off on them.    [facility]: [phone]

## 2021-05-19 NOTE — PROGRESS NOTES
Nadira Perez is a 70 year old female year old who is being evaluated via a billable video visit.      How would you like to obtain your AVS? MyChart  If the video visit is dropped, the invitation should be resent by: Text to cell phone: [phone]  Will anyone else be joining your video visit? No      Video Start Time: 9:35      Subjective   Nadira Perez who presents for the following health issues:     HPI       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  The pain is a little better.  The pain did cause her to use her Vicodin more often than previously   She uses lorazepam at HS sometimes.  She has been using melatonin for sleep.       Objective     Vitals: No vitals were obtained today due to virtual visit.    Physical Exam   GENERAL: Healthy, alert and no distress  EYES: Eyes grossly normal to inspection.  No discharge or erythema, or obvious scleral/conjunctival abnormalities.  RESP: No audible wheeze, cough, or visible cyanosis.  No visible retractions or increased work of breathing.    SKIN: Visible skin clear. No significant rash, abnormal  pigmentation or lesions.  NEURO:  Mentation and speech appropriate for age.  PSYCH: Mentation appears normal, affect normal/bright, judgement and insight intact, normal speech and appearance well-groomed.       Assessment   Nadira was seen today for medication follow-up.    Diagnoses and all orders for this visit:    Essential hypertension, benign    Hypopotassemia  -     Potassium; Future    Elevated hemoglobin A1c  -     amLODIPine (NORVASC) 5 MG tablet; Take 1 tablet (5 mg) by mouth 2 times daily  -     Hemoglobin A1c; Future    Other specified hypothyroidism  -     TSH; Future    DDD (degenerative disc disease), lumbar  -     HYDROcodone-acetaminophen (NORCO) 5-325 MG tablet; Take 1 tablet by mouth every 6 hours as needed for severe pain    Her Lorazepam was discontinued.     Video-Visit Details    Type of service:  Video Visit    Video End Time : 10:08    Originating Location (pt. Location): Home    Distant Location (provider location):  [facility] INTER[facility] MEDIC[facility] [place]     Platform used for Video Visit: Ashley    Total time spent today with this patient including chart review, exam time with patient and documentation : 30 minutes.    Matilde LONG, CNP

## 2021-06-07 ENCOUNTER — TELEPHONE (OUTPATIENT)
Dept: AUDIOLOGY | Facility: CLINIC | Age: 71
End: 2021-06-07

## 2021-06-07 NOTE — TELEPHONE ENCOUNTER
Walk-in hearing aid services on 6/7/21: Nadira reported she had lost her left dome and wanted both hearing aids cleaned.  Both devices were cleaned and  filters and domes (small open) were replaced bilaterally.  Nadira noted that lately the hearing aids were reliably pairing to her [employer] elena.  An attempt was made to re-pair the hearing aids to the phone but only one would pair at a time.  The hearing aids were then both connected to the fitting software after which they were successfully re-paired to the patient's phone.  She was given a supply of spare domes today and was advised to return as needed.

## 2021-06-16 ENCOUNTER — VIRTUAL VISIT (OUTPATIENT)
Dept: PHARMACY | Facility: CLINIC | Age: 71
End: 2021-06-16
Payer: COMMERCIAL

## 2021-06-16 DIAGNOSIS — I10 BENIGN ESSENTIAL HYPERTENSION: Primary | ICD-10-CM

## 2021-06-16 PROCEDURE — 99606 MTMS BY PHARM EST 15 MIN: CPT | Performed by: PHARMACIST

## 2021-06-16 PROCEDURE — 99607 MTMS BY PHARM ADDL 15 MIN: CPT | Performed by: PHARMACIST

## 2021-06-16 RX ORDER — CARVEDILOL 12.5 MG/1
12.5 TABLET ORAL 2 TIMES DAILY WITH MEALS
Qty: 180 TABLET | Refills: 1 | Status: SHIPPED | OUTPATIENT
Start: 2021-06-16 | End: 2021-07-20

## 2021-06-16 NOTE — PROGRESS NOTES
Medication Therapy Management (MTM) Encounter    ASSESSMENT:                            Medication Adherence/Access: No issues identified    Hypertension: Would benefit from discontinuing amlodipine secondary to PATRICIA and switching metoprolol to carvedilol for additional BP control. After titrating carvedilol to effect, may need to add spironolactone for resistant HTN.       PLAN:                            1. Stop amlodipine.   2. Stop metoprolol, start carvedilol 12.5 mg twice daily. Continue monitoring BP at home.     Follow-up: Return for Follow up, with me in 3 weeks via [employer].      SUBJECTIVE/OBJECTIVE:                          Nadira Perez is a 70 year old female called for a follow-up visit. She was referred to me from Matilde Quiñonez.  Today's visit is a follow-up MTM visit from 5/4/21.     Reason for visit: BP follow-up.    Allergies/ADRs: Reviewed in chart  Tobacco: She reports that she has never smoked. She has never used smokeless tobacco.  Alcohol: Less than 1 beverage / month  Caffeine: 1-2 cups/day of coffee  Activity: Exercising on the bike at the gym several days weekly  Past Medical History: Reviewed in chart      Medication Adherence/Access: no issues reported    Hypertension: Current medications include amlodipine 5 mg twice daily (not using), lisinopril 20 mg twice, metoprolol XL 50 mg daily, triamterene-hydrochlorothiazide 37.5-25 mg daily. When we last talked, we split up her lisinopril and amlodipine to get better coverage.  Patient increased her amlodipine to 10 mg twice daily and experienced significant bilateral pitting edema. It resolved after she discontinued her amlodipine. She has concerns about ongoing use of amlodipine and her lymphedema. The swelling in her right foot isn't completely resolved yet. does self-monitor blood pressure. See below.  Other side effects denied.  BP Readings from Last 3 Encounters:   05/10/21 (!) 155/88   11/09/20 (!) 152/90   10/22/20 126/83     BPs:  "Off amlodipine  8 -160/  8 -190/80-90    Today's Vitals: There were no vitals taken for this visit. - telemed    BP Readings from Last 1 Encounters:   05/10/21 (!) 155/88     Pulse Readings from Last 1 Encounters:   05/10/21 75     Wt Readings from Last 1 Encounters:   05/10/21 163 lb (73.9 kg)     Ht Readings from Last 1 Encounters:   09/11/20 5' 7\" (1.702 m)     Estimated body mass index is 25.53 kg/m  as calculated from the following:    Height as of 9/11/20: 5' 7\" (1.702 m).    Weight as of 5/10/21: 163 lb (73.9 kg).    Temp Readings from Last 1 Encounters:   05/10/21 98.1  F (36.7  C) (Tympanic)   ----------------    I spent 17 minutes with this patient today. All changes were made via collaborative practice agreement with Matilde Quiñonez. A copy of the visit note was provided to the patient's primary care provider.    The patient was sent via [employer] a summary of these recommendations.     Kisha Heller, Pharm.D., [facility]  Medication Therapy Management Pharmacist  Page/VM:  [phone]    Telemedicine Visit Details  Type of service:  Telephone visit  Start Time: 3:01 PM  End Time: 3:18 PM  Originating Location (patient location): [place]  Distant Location (provider location):  [facility]        Medication Therapy Recommendations  Benign essential hypertension    Rationale: Undesirable effect - Adverse medication event - Safety   Recommendation: Discontinue Medication - Stop amlodipine   Status: Accepted per CPA          Rationale: More effective medication available - Ineffective medication - Effectiveness   Recommendation: Change Medication - Stop metoprolol and start carvedilol 12.5 mg twice daily   Status: Accepted per CPA               "

## 2021-06-16 NOTE — PATIENT INSTRUCTIONS
Recommendations from today's MTM visit:                                                       1. Stop amlodipine and monitor lower extremity edema.   2. Stop metoprolol 50 mg daily. Start carvedilol 12.5 mg twice daily. Monitor BP at home.     Follow-up: Return for Follow up, with me in 3 weeks via [employer].    It was great to speak with you today.  I value your experience and would be very thankful for your time with providing feedback on our clinic survey. You may receive a survey via email or text message in the next few days.     To schedule another MTM appointment, please call the clinic directly or you may call the MTM scheduling line at [phone] or toll-free at [phone].     My Clinical Pharmacist's contact information:                                                      Please feel free to contact me with any questions or concerns you have.      Kisha Heller, Pharm.D., [facility]  Medication Therapy Management Pharmacist  Page/VM:  [phone]

## 2021-06-30 ENCOUNTER — TELEPHONE (OUTPATIENT)
Dept: AUDIOLOGY | Facility: CLINIC | Age: 71
End: 2021-06-30

## 2021-06-30 ENCOUNTER — OFFICE VISIT (OUTPATIENT)
Dept: OPHTHALMOLOGY | Facility: CLINIC | Age: 71
End: 2021-06-30
Attending: OPHTHALMOLOGY
Payer: COMMERCIAL

## 2021-06-30 DIAGNOSIS — H33.312 RETINAL TEAR OF LEFT EYE: ICD-10-CM

## 2021-06-30 DIAGNOSIS — Z96.1 PSEUDOPHAKIA OF BOTH EYES: ICD-10-CM

## 2021-06-30 DIAGNOSIS — H43.812 PVD (POSTERIOR VITREOUS DETACHMENT), LEFT EYE: Primary | ICD-10-CM

## 2021-06-30 DIAGNOSIS — H35.372 EPIRETINAL MEMBRANE (ERM) OF LEFT EYE: ICD-10-CM

## 2021-06-30 DIAGNOSIS — H26.492 PCO (POSTERIOR CAPSULAR OPACIFICATION), LEFT: ICD-10-CM

## 2021-06-30 DIAGNOSIS — H17.9 CORNEAL OPACITY: ICD-10-CM

## 2021-06-30 DIAGNOSIS — H52.213 IRREGULAR ASTIGMATISM OF BOTH EYES: ICD-10-CM

## 2021-06-30 PROCEDURE — G0463 HOSPITAL OUTPT CLINIC VISIT: HCPCS

## 2021-06-30 PROCEDURE — 99214 OFFICE O/P EST MOD 30 MIN: CPT | Mod: 25 | Performed by: OPHTHALMOLOGY

## 2021-06-30 PROCEDURE — 66821 AFTER CATARACT LASER SURGERY: CPT | Mod: LT | Performed by: OPHTHALMOLOGY

## 2021-06-30 ASSESSMENT — VISUAL ACUITY
METHOD: SNELLEN - LINEAR
OD_SC: 20/25
OS_SC: 20/50
CORRECTION_TYPE: GLASSES
OD_SC+: -2
OS_PH_SC: 20/30

## 2021-06-30 ASSESSMENT — TONOMETRY
IOP_METHOD: TONOPEN
OS_IOP_MMHG: 18
OD_IOP_MMHG: 18

## 2021-06-30 ASSESSMENT — EXTERNAL EXAM - LEFT EYE: OS_EXAM: NORMAL

## 2021-06-30 ASSESSMENT — CONF VISUAL FIELD
OD_NORMAL: 1
OS_NORMAL: 1

## 2021-06-30 ASSESSMENT — CUP TO DISC RATIO
OD_RATIO: 0.4
OS_RATIO: 0.45

## 2021-06-30 ASSESSMENT — SLIT LAMP EXAM - LIDS
COMMENTS: NORMAL
COMMENTS: NORMAL

## 2021-06-30 ASSESSMENT — EXTERNAL EXAM - RIGHT EYE: OD_EXAM: NORMAL

## 2021-06-30 NOTE — PROGRESS NOTES
CC: blurry vision left eye     Interval History: complaining of membranes in her vision; comes and goes but is more prominent over the last few months. States vision stable since last exam. Sstable floaters in both eyes. Notes a large floater in the left eye that is bothersome but is used to it. No flashes of light.    HPI: Patient fell 9/24/2020.  Blurry vision left eye starting around 10/2/2020. Noted to have HST - now s/p barricade laser 1/18/2021. No pain, floaters are stable; denies flashes.     Assessment/plan:   1.  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 Retinal tear left eye   - see #1; s/p barricade laser 1/18/21    3. Pseudophakia each eye   - right eye s/p yag capsulotomy   - left eye PCO: yag caps today     4. Dry eye each eye  ATs PRN    5. ERM left eye  Mild; observe      RTC: 3-4 months for DFE        Complete documentation of historical and exam elements from today's encounter can be found in the full encounter summary report (not reduplicated in this progress note). I personally obtained the chief complaint(s) and history of present illness.  I confirmed and edited as necessary the review of systems, past medical/surgical history, family history, social history, and examination findings as documented by others; and I examined the patient myself. I personally reviewed the relevant tests, images, and reports as documented above. I formulated and edited as necessary the assessment and plan and discussed the findings and management plan with the patient and family.    Felix Oilveros MD

## 2021-06-30 NOTE — NURSING NOTE
Chief Complaints and History of Present Illnesses   Patient presents with     Follow Up     Chief Complaint(s) and History of Present Illness(es)     Follow Up     Associated symptoms: floaters.  Negative for flashes, eye pain and pain with eye movement    Pain scale: 0/10              Comments     4 month follow up  Want refraction today   Floaters and film LE  Art tears neelam SCOTT 1:30 PM June 30, 2021

## 2021-06-30 NOTE — TELEPHONE ENCOUNTER
Walk-in hearing aid services on 6/30/21: The patient's left hearing aid  had broken.  A new  (#1-S) was placed on the device.  Naidra will be billed $100.00 for the new .  Her right hearing aid was cleaned and the dome (small open) was replaced.  Both hearing aids were found to be working normally.  Nadira was provided with one pack of  filters and was advised to return as needed.

## 2021-07-01 ENCOUNTER — OFFICE VISIT (OUTPATIENT)
Dept: AUDIOLOGY | Facility: CLINIC | Age: 71
End: 2021-07-01

## 2021-07-01 DIAGNOSIS — H90.3 ASYMMETRIC SNHL (SENSORINEURAL HEARING LOSS): Primary | ICD-10-CM

## 2021-08-06 ENCOUNTER — MYC MEDICAL ADVICE (OUTPATIENT)
Dept: INTERNAL MEDICINE | Facility: CLINIC | Age: 71
End: 2021-08-06

## 2021-08-06 DIAGNOSIS — G89.29 CHRONIC RIGHT-SIDED LOW BACK PAIN WITH RIGHT-SIDED SCIATICA: ICD-10-CM

## 2021-08-06 DIAGNOSIS — M54.41 CHRONIC RIGHT-SIDED LOW BACK PAIN WITH RIGHT-SIDED SCIATICA: ICD-10-CM

## 2021-08-09 RX ORDER — TIZANIDINE 2 MG/1
2-4 TABLET ORAL 3 TIMES DAILY PRN
Qty: 90 TABLET | Refills: 1 | Status: SHIPPED | OUTPATIENT
Start: 2021-08-09 | End: 2021-10-31

## 2021-08-09 NOTE — TELEPHONE ENCOUNTER
tiZANidine (ZANAFLEX) 2 MG tablet  Last Written Prescription Date:  8/19/2020  Last Fill Quantity: 30,   # refills: 1  Last Office Visit : 5/18/2021  Future Office visit:  None    Routing refill request to provider for review/approval because:  Drug not on the [facility], [facility] or [facility] refill protocol or controlled substance      Cora Jones RN  Central Triage Red Flags/Med Refills

## 2021-08-10 NOTE — TELEPHONE ENCOUNTER
Call patient to help schedule appointment with Matilde. Last visit was 5/18/21. Patient decline to schedule and ask to check with RN.

## 2021-08-18 DIAGNOSIS — G89.29 CHRONIC RIGHT-SIDED LOW BACK PAIN WITH RIGHT-SIDED SCIATICA: ICD-10-CM

## 2021-08-18 DIAGNOSIS — M54.41 CHRONIC RIGHT-SIDED LOW BACK PAIN WITH RIGHT-SIDED SCIATICA: ICD-10-CM

## 2021-08-21 NOTE — CONFIDENTIAL NOTE
tiZANidine (ZANAFLEX) 2 MG tablet  Last Written Prescription Date: 8/9/21  Last Fill Quantity: 90,   # refills: 1  Last Office Visit :5/18/21  Future Office visit:  None    Routing refill request to provider for review/approval because: not on protocol

## 2021-08-24 RX ORDER — TIZANIDINE 2 MG/1
TABLET ORAL
OUTPATIENT
Start: 2021-08-24

## 2021-08-26 ENCOUNTER — LAB (OUTPATIENT)
Dept: LAB | Facility: CLINIC | Age: 71
End: 2021-08-26
Payer: COMMERCIAL

## 2021-08-26 ENCOUNTER — OFFICE VISIT (OUTPATIENT)
Dept: ORTHOPEDICS | Facility: CLINIC | Age: 71
End: 2021-08-26
Payer: COMMERCIAL

## 2021-08-26 DIAGNOSIS — M47.817 SPONDYLOSIS WITHOUT MYELOPATHY OR RADICULOPATHY, LUMBOSACRAL REGION: Primary | ICD-10-CM

## 2021-08-26 DIAGNOSIS — R20.2 PARESTHESIAS IN RIGHT HAND: ICD-10-CM

## 2021-08-26 DIAGNOSIS — E87.6 HYPOPOTASSEMIA: ICD-10-CM

## 2021-08-26 DIAGNOSIS — E03.8 OTHER SPECIFIED HYPOTHYROIDISM: ICD-10-CM

## 2021-08-26 DIAGNOSIS — R73.09 ELEVATED HEMOGLOBIN A1C: ICD-10-CM

## 2021-08-26 LAB
HBA1C MFR BLD: 5.8 % (ref 0–5.6)
POTASSIUM BLD-SCNC: 3.2 MMOL/L (ref 3.4–5.3)
TSH SERPL DL<=0.005 MIU/L-ACNC: 1.95 MU/L (ref 0.4–4)

## 2021-08-26 PROCEDURE — 84132 ASSAY OF SERUM POTASSIUM: CPT | Performed by: PATHOLOGY

## 2021-08-26 PROCEDURE — 36415 COLL VENOUS BLD VENIPUNCTURE: CPT | Performed by: PATHOLOGY

## 2021-08-26 PROCEDURE — 84443 ASSAY THYROID STIM HORMONE: CPT | Performed by: PATHOLOGY

## 2021-08-26 PROCEDURE — 99215 OFFICE O/P EST HI 40 MIN: CPT | Performed by: FAMILY MEDICINE

## 2021-08-26 PROCEDURE — 83036 HEMOGLOBIN GLYCOSYLATED A1C: CPT | Performed by: PATHOLOGY

## 2021-08-26 NOTE — PROGRESS NOTES
ESTABLISHED PATIENT FOLLOW-UP:  Follow Up of the Spine       HISTORY OF PRESENT ILLNESS  Ms. Perez is a pleasant 71 year old year old female who presents to clinic today for follow-up of low back pain.    Date of injury: Chronic   Date last seen: 7/30/21 by Arnulfo   Following Therapeutic Plan: Yes  Pain: Improving   Function: Improving  Interval History: Has been doing better. The pain was initially on right side and now its more so on the left.  She is interested in another GREGOR. She is also having cervical spine pain and wondering if her scoliosis is causing this pain. Also having new pain in there right thumb and index finger and is sometimes numb.  Pain with cervical motion. No pain at wrist. No pain with use of wrists and hands.    Additional medical/Social/Surgical histories reviewed in EPIC and updated as appropriate.    REVIEW OF SYSTEMS (8/26/2021)  CONSTITUTIONAL: Denies fever and weight loss  GASTROINTESTINAL: Denies abdominal pain, nausea, vomiting  MUSCULOSKELETAL: See HPI  SKIN: Denies any recent rash or lesion  NEUROLOGICAL: Denies numbness or focal weakness     PHYSICAL EXAM  There were no vitals taken for this visit.    General  - normal appearance, in no obvious distress  HEENT  - conjunctivae not injected, moist mucous membranes  CV  - normal peripheral perfusion  Pulm  - normal respiratory pattern, non-labored  Musculoskeletal - cervical spine  - inspection: normal bone and joint alignment, no obvious kyphosis  - palpation: no paravertebral or bony tenderness  - ROM: normal and painless flexion, extension, left and right sidebending and rotation  - strength: upper extremities 5/5 in all planes  - special tests:  (-) Spurling bilaterally  (-) carpal compression tests  (-) Tinels wrist  Musculoskeletal - lumbar spine  - stance: slow to rise and sit  - inspection: normal bone and joint alignment, no obvious scoliosis  - palpation: Tender left gluteal region, bilateral tenderness at L5-S1 facets near  PSIS.   - ROM: pain with bilateral rotation, extension, no pain with flexion and improves symptoms.  - strength: lower extremities 5/5 in all planes  - special tests:  (-) straight leg raise bilaterally  Neuro  - no sensory or motor deficit of left upper  Or lower extremity, grossly normal coordination, normal muscle tone. Altered sensation of lateral right lower leg in L5 distribution.  Skin  - no ecchymosis, erythema, warmth, or induration, no obvious rash.    Psych  - interactive, appropriate, normal mood and affect    IMAGING :   MRI LUMBAR SPINE 12/20/2019    Impression:  1. Multilevel periarticular enhancement of the lumbar facet joints,  left greater than right as detailed above. Findings suggest  inflammatory facet arthropathy.  2. Multilevel lumbar spondylosis as described in the findings.  3. T11 anterior compression deformity, likely chronic.     I have personally reviewed the examination and initial interpretation  and I agree with the findings.     KENNY NAVARRO MD     ASSESSMENT & PLAN  Ms. 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  Given distribution of her current pain, suspect pain related to facet arthropathy.      (M47.817) Spondylosis without myelopathy or radiculopathy, lumbosacral region  (primary encounter diagnosis)    Paresthesias of right hand    At this time would like to proceed by ordering her repeat therapeutic L5-S1 bilateral facet injections.  Unfortunately her MRI is greater than 2 years old and would need to have this repeated prior to referral to IR for injections.    I will MyChart message.  If MRI looks stable we can proceed with injections as planned.  Otherwise she will also start formal physical therapy as she has not had a course in a couple of years.  I think based on her current posture as well as pain, she would benefit  from some therapy at least every other year.    In regard to her cervical and right upper extremity complaints, differential diagnosis includes cervical radiculitis versus carpal tunnel syndrome.  Start with home exercise program for cervical symptoms as it is persistent throughout the day.  If no improvement is noted, may consider attempting treatment for carpal tunnel with wrist bracing at night versus further diagnostics.    It was a pleasure seeing Miguel Borjas DO, [facility]  Primary Care Sports Medicine    45 minutes on date of the encounter doing chart review, history and examination, independent imaging review, documentation, and additional activities noted above.

## 2021-08-26 NOTE — PATIENT INSTRUCTIONS
Neck rotation with flexion  Right side: Turn your head to the right and clasp your hands behind your head. Let the weight of your arms pull your chin to the right side of your chest. Relax. Hold for a count of 15. Do this 3 times.    Left side: Turn your head to the left and clasp your hands behind your head. Let the weight of your arms pull your chin to the left side of your chest. Relax. Hold for a count of 15. Do this 3 times.    Chin tuck: Place your fingertips on your chin and gently push your head straight back as if you are trying to make a double chin. Keep looking forward as your head moves back. Hold 5 seconds and repeat 5 times.  Scalene stretch: Sit or stand and clasp both hands behind your back. Lower your left shoulder and tilt your head toward the right until you feel a stretch. Hold this position for 15 to 30 seconds and then come back to the starting position. Then lower your right shoulder and tilt your head toward the left. Hold for 15 to 30 seconds. Repeat 3 times on each side.  Neck rotation stretch    Right side: Rotate your neck by looking over your right shoulder. Lift your right hand and place your palm on the left side of your chin. Push your chin with your palm toward your right shoulder. Hold for a count of 10. Do this 3 times.    Left side: Rotate your neck by looking over your left shoulder. Lift your left hand and place your palm on the right side of your chin. Push your chin with your palm toward your left shoulder. Hold for a count of 10. Do this 3 times.    Scapular squeeze: While sitting or standing with your arms by your sides, squeeze your shoulder blades together and hold for 5 seconds. Do 2 sets of 15.  Thoracic extension: Sit in a chair and clasp both arms behind your head. Gently arch backward and look up toward the ceiling. Repeat 10 times. Do this several times each day.    Head lift with neck curl: Lie on your back with your knees bent and your feet flat on the floor.  Tuck your chin and lift your head about 3 inches off the floor, keeping your shoulders flat on the floor. Hold for 10 seconds. Repeat 5 times. Try to work up to holding for 20 to 30 seconds.

## 2021-08-26 NOTE — LETTER
8/26/2021      RE: Nadira Perez  [address]  [facility] [address]       ESTABLISHED PATIENT FOLLOW-UP:  Follow Up of the Spine       HISTORY OF PRESENT ILLNESS  Ms. Perez is a pleasant 71 year old year old female who presents to clinic today for follow-up of low back pain.    Date of injury: Chronic   Date last seen: 7/30/21 by Arnlufo   Following Therapeutic Plan: Yes  Pain: Improving   Function: Improving  Interval History: Has been doing better. The pain was initially on right side and now its more so on the left.  She is interested in another GREGOR. She is also having cervical spine pain and wondering if her scoliosis is causing this pain. Also having new pain in there right thumb and index finger and is sometimes numb.  Pain with cervical motion. No pain at wrist. No pain with use of wrists and hands.    Additional medical/Social/Surgical histories reviewed in EPIC and updated as appropriate.    REVIEW OF SYSTEMS (8/26/2021)  CONSTITUTIONAL: Denies fever and weight loss  GASTROINTESTINAL: Denies abdominal pain, nausea, vomiting  MUSCULOSKELETAL: See HPI  SKIN: Denies any recent rash or lesion  NEUROLOGICAL: Denies numbness or focal weakness     PHYSICAL EXAM  There were no vitals taken for this visit.    General  - normal appearance, in no obvious distress  HEENT  - conjunctivae not injected, moist mucous membranes  CV  - normal peripheral perfusion  Pulm  - normal respiratory pattern, non-labored  Musculoskeletal - cervical spine  - inspection: normal bone and joint alignment, no obvious kyphosis  - palpation: no paravertebral or bony tenderness  - ROM: normal and painless flexion, extension, left and right sidebending and rotation  - strength: upper extremities 5/5 in all planes  - special tests:  (-) Spurling bilaterally  (-) carpal compression tests  (-) Tinels wrist  Musculoskeletal - lumbar spine  - stance: slow to rise and sit  - inspection: normal bone and joint alignment, no obvious  scoliosis  - palpation: Tender left gluteal region, bilateral tenderness at L5-S1 facets near PSIS.   - ROM: pain with bilateral rotation, extension, no pain with flexion and improves symptoms.  - strength: lower extremities 5/5 in all planes  - special tests:  (-) straight leg raise bilaterally  Neuro  - no sensory or motor deficit of left upper  Or lower extremity, grossly normal coordination, normal muscle tone. Altered sensation of lateral right lower leg in L5 distribution.  Skin  - no ecchymosis, erythema, warmth, or induration, no obvious rash.    Psych  - interactive, appropriate, normal mood and affect    IMAGING :   MRI LUMBAR SPINE 12/20/2019    Impression:  1. Multilevel periarticular enhancement of the lumbar facet joints,  left greater than right as detailed above. Findings suggest  inflammatory facet arthropathy.  2. Multilevel lumbar spondylosis as described in the findings.  3. T11 anterior compression deformity, likely chronic.     I have personally reviewed the examination and initial interpretation  and I agree with the findings.     KENNY NAVARRO MD     ASSESSMENT & PLAN  Ms. 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  Given distribution of her current pain, suspect pain related to facet arthropathy.      (M47.817) Spondylosis without myelopathy or radiculopathy, lumbosacral region  (primary encounter diagnosis)    Paresthesias of right hand    At this time would like to proceed by ordering her repeat therapeutic L5-S1 bilateral facet injections.  Unfortunately her MRI is greater than 2 years old and would need to have this repeated prior to referral to IR for injections.    I will MyChart message.  If MRI looks stable we can proceed with injections as planned.  Otherwise she will also start formal physical therapy as she has not had a course  in a couple of years.  I think based on her current posture as well as pain, she would benefit from some therapy at least every other year.    In regard to her cervical and right upper extremity complaints, differential diagnosis includes cervical radiculitis versus carpal tunnel syndrome.  Start with home exercise program for cervical symptoms as it is persistent throughout the day.  If no improvement is noted, may consider attempting treatment for carpal tunnel with wrist bracing at night versus further diagnostics.    It was a pleasure seeing Nadira.    Willis Borjas DO, [facility]  Primary Care Sports Medicine    45 minutes on date of the encounter doing chart review, history and examination, independent imaging review, documentation, and additional activities noted above.      Willis Borjas DO

## 2021-08-31 ENCOUNTER — MYC MEDICAL ADVICE (OUTPATIENT)
Dept: INTERNAL MEDICINE | Facility: CLINIC | Age: 71
End: 2021-08-31

## 2021-08-31 DIAGNOSIS — E87.6 HYPOPOTASSEMIA: ICD-10-CM

## 2021-09-02 RX ORDER — POTASSIUM CHLORIDE 1500 MG/1
TABLET, EXTENDED RELEASE ORAL
Qty: 90 TABLET | Refills: 3 | Status: SHIPPED | OUTPATIENT
Start: 2021-09-02 | End: 2021-10-28

## 2021-09-04 ENCOUNTER — HOSPITAL ENCOUNTER (OUTPATIENT)
Dept: MRI IMAGING | Facility: CLINIC | Age: 71
Discharge: HOME | End: 2021-09-04
Attending: FAMILY MEDICINE | Admitting: FAMILY MEDICINE
Payer: COMMERCIAL

## 2021-09-04 DIAGNOSIS — M47.817 SPONDYLOSIS WITHOUT MYELOPATHY OR RADICULOPATHY, LUMBOSACRAL REGION: ICD-10-CM

## 2021-09-04 PROCEDURE — 72148 MRI LUMBAR SPINE W/O DYE: CPT

## 2021-09-07 DIAGNOSIS — Z11.59 ENCOUNTER FOR SCREENING FOR OTHER VIRAL DISEASES: ICD-10-CM

## 2021-09-07 DIAGNOSIS — M47.817 SPONDYLOSIS WITHOUT MYELOPATHY OR RADICULOPATHY, LUMBOSACRAL REGION: Primary | ICD-10-CM

## 2021-09-09 ENCOUNTER — TELEPHONE (OUTPATIENT)
Dept: ORTHOPEDICS | Facility: CLINIC | Age: 71
End: 2021-09-09

## 2021-09-09 ENCOUNTER — LAB (OUTPATIENT)
Dept: LAB | Facility: CLINIC | Age: 71
End: 2021-09-09
Payer: COMMERCIAL

## 2021-09-09 ENCOUNTER — MYC MEDICAL ADVICE (OUTPATIENT)
Dept: INTERNAL MEDICINE | Facility: CLINIC | Age: 71
End: 2021-09-09

## 2021-09-09 DIAGNOSIS — Z11.59 ENCOUNTER FOR SCREENING FOR OTHER VIRAL DISEASES: ICD-10-CM

## 2021-09-09 PROCEDURE — U0003 INFECTIOUS AGENT DETECTION BY NUCLEIC ACID (DNA OR RNA); SEVERE ACUTE RESPIRATORY SYNDROME CORONAVIRUS 2 (SARS-COV-2) (CORONAVIRUS DISEASE [COVID-19]), AMPLIFIED PROBE TECHNIQUE, MAKING USE OF HIGH THROUGHPUT TECHNOLOGIES AS DESCRIBED BY CMS-2020-01-R: HCPCS

## 2021-09-09 PROCEDURE — U0005 INFEC AGEN DETEC AMPLI PROBE: HCPCS

## 2021-09-09 NOTE — TELEPHONE ENCOUNTER
----- Message from Willis Borjas DO sent at 2021 12:10 PM CDT -----  Regarding: FW: Peer to Peer Request  Approved. Let Ismael know the good news!    They will fax approval info to [facility]    ----- Message -----  From: Willis Borjas DO  Sent: 2021  10:00 AM CDT  To: Willis Borjas DO, [facility] Sports Medicine [facility]  Subject: FW: Peer to Peer Request                         If there is a time to do this tomorrow, lets keep this on the agenda.  ----- Message -----  From: Daniele Parikh  Sent: 2021   9:07 AM CDT  To: Willis Borjas DO  Subject: Peer to Peer Request                             Peer to Peer Request    Patient Name:  Chris Waddell  :   1950  MRN:              [id number]    Dr. Borjas,     The authorization for procedure   XR LUMBAR SACRAL FACET INJ BILATERAL on date of service 2021  has been denied. We were unsuccessful in obtaining approval through clinical review. A [id number] review can be done by calling the insurance/third party authorization vendor with the following information:    Insurance: [facility] Medicare Advantage  Auth Vendor:  [employer]  Phone:  [phone]  Due:  2021    Patient ID: [id number]  Case/Ref #: [id number]    Denial Reason:  Unable to authorize at this time with information provided.   Based on Medicare Local Coverage Determination (LCD): Facet Joint Interventions for Pain Management ([id number]), we cannot approve this request.The requested service is supported for either of the followin) if the injection helps the pain, your doctor is planning to perform a radiofrequency ablation (RFA). An RFA decreases your pain by heating the nerve tissue, or 2) you are not a candidate for an RFA. Your records do not show that one of these apply to you. Your records show that you have had a prior surgery to join the front side of the bones in lower back (anterior lumbar interbody spinal fusion). The procedure your doctor requested may be supported at spinal  levels above and/or below your prior fusion, but not at the same level. Your records do not show that this request is for a level other than the level of your prior spinal fusion.     Please complete this as soon as you are able and let me know when it is done.    Thank you,    Daniele Parikh   [facility]

## 2021-09-09 NOTE — TELEPHONE ENCOUNTER
Lyah-in-ykoe with insurance company at 12:02 PM on 9/9/2021.  After discussion with physician, she agreed to approve a request for bilateral L5-S1 facet injection for Ismael.      They will be sending approval letter via fax to our office at [facility].  I will make sure my staff reaches out to Ismael with news that she can proceed with her procedure on Monday.    Willis Borjas DO [facility]  Primary Care Sports Medicine  [facility]

## 2021-09-10 ENCOUNTER — TELEPHONE (OUTPATIENT)
Dept: MEDSURG UNIT | Facility: CLINIC | Age: 71
End: 2021-09-10

## 2021-09-10 LAB — SARS-COV-2 RNA RESP QL NAA+PROBE: NEGATIVE

## 2021-09-10 NOTE — TELEPHONE ENCOUNTER
Pre-Procedure Negative COVID Test Results    Results Reviewed  The patient has a negative COVID test result within the required timeframe for the scheduled procedure.     No COVID pre-call needed.     Sari Gooden RN

## 2021-09-13 ENCOUNTER — HOSPITAL ENCOUNTER (OUTPATIENT)
Facility: CLINIC | Age: 71
Discharge: HOME | End: 2021-09-13
Admitting: PHYSICIAN ASSISTANT
Payer: COMMERCIAL

## 2021-09-13 ENCOUNTER — HOSPITAL ENCOUNTER (OUTPATIENT)
Dept: GENERAL RADIOLOGY | Facility: CLINIC | Age: 71
End: 2021-09-13
Attending: FAMILY MEDICINE
Payer: COMMERCIAL

## 2021-09-13 VITALS
RESPIRATION RATE: 20 BRPM | HEART RATE: 77 BPM | SYSTOLIC BLOOD PRESSURE: 139 MMHG | OXYGEN SATURATION: 99 % | DIASTOLIC BLOOD PRESSURE: 83 MMHG

## 2021-09-13 VITALS — SYSTOLIC BLOOD PRESSURE: 124 MMHG | OXYGEN SATURATION: 98 % | HEART RATE: 73 BPM | DIASTOLIC BLOOD PRESSURE: 82 MMHG

## 2021-09-13 DIAGNOSIS — M47.817 SPONDYLOSIS WITHOUT MYELOPATHY OR RADICULOPATHY, LUMBOSACRAL REGION: ICD-10-CM

## 2021-09-13 PROCEDURE — 64493 INJ PARAVERT F JNT L/S 1 LEV: CPT | Mod: 50

## 2021-09-13 PROCEDURE — 250N000009 HC RX 250: Performed by: FAMILY MEDICINE

## 2021-09-13 PROCEDURE — 250N000011 HC RX IP 250 OP 636: Performed by: FAMILY MEDICINE

## 2021-09-13 PROCEDURE — 255N000002 HC RX 255 OP 636: Performed by: FAMILY MEDICINE

## 2021-09-13 PROCEDURE — 999N000154 HC STATISTIC RADIOLOGY XRAY, US, CT, MAR, NM

## 2021-09-13 RX ORDER — LIDOCAINE HYDROCHLORIDE 10 MG/ML
30 INJECTION, SOLUTION EPIDURAL; INFILTRATION; INTRACAUDAL; PERINEURAL ONCE
Status: COMPLETED | OUTPATIENT
Start: 2021-09-13 | End: 2021-09-13

## 2021-09-13 RX ORDER — IOPAMIDOL 408 MG/ML
10 INJECTION, SOLUTION INTRATHECAL ONCE
Status: COMPLETED | OUTPATIENT
Start: 2021-09-13 | End: 2021-09-13

## 2021-09-13 RX ORDER — TRIAMCINOLONE ACETONIDE 40 MG/ML
80 INJECTION, SUSPENSION INTRA-ARTICULAR; INTRAMUSCULAR ONCE
Status: COMPLETED | OUTPATIENT
Start: 2021-09-13 | End: 2021-09-13

## 2021-09-13 RX ADMIN — IOPAMIDOL 1 ML: 408 INJECTION, SOLUTION INTRATHECAL at 10:20

## 2021-09-13 RX ADMIN — LIDOCAINE HYDROCHLORIDE 8 ML: 10 INJECTION, SOLUTION EPIDURAL; INFILTRATION; INTRACAUDAL; PERINEURAL at 10:30

## 2021-09-13 RX ADMIN — TRIAMCINOLONE ACETONIDE 80 MG: 40 INJECTION, SUSPENSION INTRA-ARTICULAR; INTRAMUSCULAR at 10:30

## 2021-09-13 NOTE — DISCHARGE INSTRUCTIONS

## 2021-09-13 NOTE — PROGRESS NOTES
Care Suites Post Procedure Note    Patient Information  Name: Nadira Perez  Age: 71 year old    Post Procedure  Time patient returned to Care Suites: 1040  Concerns/abnormal assessment:   Lower back site intact   Rates pain improved at 2/10  VSS  If abnormal assessment, provider notified: N/A  Plan/Other: discharge to home     Care Suites Discharge Nursing Note    Discharge Education:  Discharge instructions reviewed: Yes  Additional education/resources provided: no  Patient/patient representative verbalizes understanding: Yes  Patient discharging on new medications: no  Medication education completed: N/A    Discharge Plans:   Discharge location: home-  Friend driving   Discharge ride contacted: Yes  Approximate discharge time: 1145    Discharge Criteria:  Discharge criteria met and vital signs stable: Yes    Patient Belongs:  Patient belongings returned to patient: Yes    Porsha Pritchett RN

## 2021-09-13 NOTE — PROCEDURES
[facility]    Procedure: Bilateral L5-S1 facet joint injections    Date/Time: 9/13/2021 10:44 AM  Performed by: Anuradha Wray PA-C  Authorized by: Anuradha Wray PA-C     UNIVERSAL PROTOCOL   Site Marked: Yes  Prior Images Obtained and Reviewed:  Yes  Required items: Required blood products, implants, devices and special equipment available    Patient identity confirmed:  Verbally with patient  NA - No sedation, light sedation, or local anesthesia  Confirmation Checklist:  Patient's identity using two indicators, relevant allergies, procedure was appropriate and matched the consent or emergent situation and correct equipment/implants were available  Time out: Immediately prior to the procedure a time out was called    Universal Protocol: the Joint Commission Universal Protocol was followed    Preparation: Patient was prepped and draped in usual sterile fashion           ANESTHESIA    Anesthesia: Local infiltration  Local Anesthetic:  Lidocaine 1% without epinephrine      SEDATION    Patient Sedated: No    See dictated procedure note for full details.  PROCEDURE   Patient Tolerance:  Patient tolerated the procedure well with no immediate complications    Length of time physician/provider present for 1:1 monitoring during sedation: 0

## 2021-09-17 ENCOUNTER — MYC MEDICAL ADVICE (OUTPATIENT)
Dept: INTERNAL MEDICINE | Facility: CLINIC | Age: 71
End: 2021-09-17

## 2021-09-17 DIAGNOSIS — M51.369 DDD (DEGENERATIVE DISC DISEASE), LUMBAR: ICD-10-CM

## 2021-09-18 DIAGNOSIS — R26.89 IMBALANCE: Primary | ICD-10-CM

## 2021-09-21 ENCOUNTER — THERAPY VISIT (OUTPATIENT)
Dept: PHYSICAL THERAPY | Facility: CLINIC | Age: 71
End: 2021-09-21
Attending: FAMILY MEDICINE
Payer: COMMERCIAL

## 2021-09-21 DIAGNOSIS — M47.817 SPONDYLOSIS WITHOUT MYELOPATHY OR RADICULOPATHY, LUMBOSACRAL REGION: ICD-10-CM

## 2021-09-21 PROCEDURE — 97162 PT EVAL MOD COMPLEX 30 MIN: CPT | Mod: GP | Performed by: PHYSICAL THERAPIST

## 2021-09-21 PROCEDURE — 97110 THERAPEUTIC EXERCISES: CPT | Mod: GP | Performed by: PHYSICAL THERAPIST

## 2021-09-21 NOTE — PROGRESS NOTES
"[facility]: Physical Therapy Initial Evaluation   Sep 21, 2021    Subjective:    Chief Complaint: LBP    Pain: Across low back, sometimes down R leg - hurts to touch   Numbness/Tingling: Down R leg - \"Sciatica-like\"    Stiffness: In low back   New/Recurrent/Chronic: Chronic   DOI/onset: Early 2020   Referral Date: 8/26/2021 - Willis Borjas DO  Mechanism of onset: Gradual onset, had stopped doing pilates and thought it was due to the that.  PMH/surgical history/trauma:   - anemia   - changes in bowel/bladder   - cancer   - concussions/dizziness   - fibromyalgia   - history of fractures   - heart problems   - hepatitis   - high blood pressure   - numbness/tingling   - overweight   - osteoarthritis   - osteoporosis   - pain at night/rest   - thyroid problems   - weakness   - DDD, lumbar    - Degenerative scoliosis   - Multiple surgeries - see chart for full list   - Total hysterectomy, right thyroid lobectomy, right hyperparathyroidectomies, bilateral cataracts corrected, umbilical hernia repair (infant)  General health as reported by patient:  Fair   Medications:  Anti-inflammatory, Bone density, Cardiac, HTN, Muscle Relaxants, Pain, Sleep, Thyroid, Anastrozole for breast cancer (2007)  Occupation: Retired - RN  Job duties: computer work, prolonged sitting  Previous Treatment (Effect): Bilateral facet epidurals a week ago, heat (helpful - hot baths), Tylenol/Advil (helpful)   Imaging:   MRI - 9/4/21 - Richard Aguilera MD ([facility])   1.  At L5-S1, there is grade I/II anterolisthesis due to severe degenerative facet changes. There is a large central and left central disc herniation that contacts and causes mass effect on the left L5 and S1 nerve roots. Correlate for radiculopathy in this nerve root distribution.   2.  At L5-S1, there is severe bilateral neural foraminal stenosis with deformity of the exiting right and left L5 nerve roots.   3.  At L2-L3, there is a left foraminal and " far left lateral cranially directed disc extrusion causing severe left neural foraminal stenosis and contacting the foraminal and extraforaminal left L2 nerve root.  AM/PM: As the meds wear off, pain increases. Takes a hot bath to relieve pain.   Quality of Pain: Achy, sometimes sharp. Spasms sometimes.   Pain: 3/10 at present, 0/10 at best (on meds), 8/10 at worst  Worse: Laying on stomach, not changing positions often enough  Better: Stretching low back, move around  Progression of Symptoms since onset: Better    Hx of Falls: Not in the last month, but before then yes. Has balance issues, going to start balance therapy soon.   Sleeping: Wakes up multiple times throughout the night, most of the time to pee  Current Functional Status: SEE GOALS FLOWSHEET  - Has a part time  for the house  - Walking - walks with 4WW for the last year.   Current HEP/exercise regimen: Once a week pilates , bikes a couple times a week at the gym   Transportation to Therapy: Independent with transportation  Live with Others: Roommate 2-3 nights a week, has a pet bird.   Red Flags:   - Patient denies the following:  Pain with Cough / Sneeze / Laughing ; Weakness ; Unexplained Weight Loss   - Patient reports the following: Numbness/Tingling ;  Change in Bowel or Bladder ;     Patient's goal(s): To be able to be as independent as possible and continue to do exercises and correct body mechanics.         Objective:    Posture: Increased lumbar lordosis, knee bend B     Gait: Uses 4WW, flexed trunk    Tandem stance: unsteady, able to hold ~3 seconds before losing balance and needing support       Lumbar AROM: (Major, Moderate, Minimal or Nil loss)  Movement Loss Jean Carlos Mod Min Nil Comments   Flexion    X    Extension   X*  Pain L low back, lost balance during   Side Gliding L    X    Side Gliding R    X    Sidebending L    X    Sidebenging R    X        Neurological:    Motor Deficit:  Myotomes R L   L1-2 (hip flexion)  4+ 4   L3 (knee extension) 5 5   L4 (ankle DF) 3+ 4+   L5 (g. toe ext) 3+ 5   S1 (knee flex) 5 5       Dural Signs:   R L   Slump - -         HIP: (* indicates patient's pain)   PROM R PROM L MMT R MMT L   Flexion WNL  WNL 4+ 4   Abd WNL WNL 5 3*   Ext WNL WNL 3-* 3+       Other: Seated lumbar extension - a little bit of pain but feels more like a stretch.           Therapist Impression/Differential Diagnosis:   Ismael presents to physical therapy with a primary complaint of low back pain and occasional sciatic pain down R leg. Clinical findings include balance impairments, lack of hip strength, and gait deviations. Exam findings seem to point to hip strength/motion to be the cause of the low back pain as seated lumbar extension did not aggravate the pain, but hip extension in prone was painful. Pt will benefit from physical therapy to help gain independence in home exercise program working on overall hip strength, balance and improving gait performance.       Assessment/Plan:    Patient is a 71 year old female with lumbar and sacral complaints.    Patient has the following significant findings with corresponding treatment plan.                Referring Diagnosis: Spondylosis without myelopathy or radiculopathy, lumbosacral region   PT Diagnosis: Low back pain with hip strength deficits and balance deficits     Pain -  hot/cold therapy, manual therapy, self management, education and home program  Decreased strength - therapeutic exercise, therapeutic activities and home program  Impaired balance - neuro re-education, therapeutic activities and home program  Decreased proprioception - neuro re-education, therapeutic activities and home program  Impaired gait - gait training and home program  Impaired muscle performance - neuro re-education and home program  Decreased function - therapeutic activities and home program  Impaired posture - neuro re-education and home program  Instability -  Therapeutic Activity,  Therapeutic Exercise, Neuromuscular Re-education, and home program        Therapy Evaluation Codes:   1) History comprised of:   Personal factors that impact the plan of care:      Time since onset of symptoms.    Comorbidity factors that impact the plan of care are:      Bowel/bladder changes, High blood pressure, Numbness/tingling and Weakness.     Medications impacting care: Anti-inflammatory, Bone density, Cardiac, High blood pressure, Muscle relaxant, Pain and Sleep.  2) Examination of Body Systems comprised of:   Body structures and functions that impact the plan of care:      Hip and Lumbar spine.   Activity limitations that impact the plan of care are:      Sitting, Standing and Walking.  3) Clinical presentation characteristics are:   Evolving/Changing.  4) Decision-Making    Moderate complexity using standardized patient assessment instrument and/or measureable assessment of functional outcome.  Cumulative Therapy Evaluation is: Moderate complexity.    Previous and current functional limitations:  (See Goal Flow Sheet for this information)    Short term and Long term goals: (See Goal Flow Sheet for this information)     Communication ability:  Patient appears to be able to clearly communicate and understand verbal and written communication and follow directions correctly.  Treatment Explanation - The following has been discussed with the patient:   RX ordered/plan of care  Anticipated outcomes  Possible risks and side effects  This patient would benefit from PT intervention to resume normal activities.   Rehab potential is fair.    Frequency:  1 X week, once daily  Duration:  for 6 weeks  Discharge Plan:  Achieve all LTG.  Independent in home treatment program.  Reach maximal therapeutic benefit.    Please refer to the daily flowsheet for treatment today, total treatment time and time spent performing 1:1 timed codes.

## 2021-09-22 ENCOUNTER — MYC MEDICAL ADVICE (OUTPATIENT)
Dept: INTERNAL MEDICINE | Facility: CLINIC | Age: 71
End: 2021-09-22

## 2021-09-22 NOTE — TELEPHONE ENCOUNTER
Patient Requested  HYDROcodone-acetaminophen (NORCO) 5-325 MG tablet  Last Filled  05/18/2021  Last Office Visit  05/18/2021  Next Office Visit     Checked  09/22/2021    DX:     Pharmacy:     TAMARA PERRY CMA at 8:20 AM on 5/11/2021.

## 2021-09-23 ENCOUNTER — MYC REFILL (OUTPATIENT)
Dept: INTERNAL MEDICINE | Facility: CLINIC | Age: 71
End: 2021-09-23

## 2021-09-23 DIAGNOSIS — I10 BENIGN ESSENTIAL HYPERTENSION: ICD-10-CM

## 2021-09-23 PROBLEM — M47.817 SPONDYLOSIS WITHOUT MYELOPATHY OR RADICULOPATHY, LUMBOSACRAL REGION: Status: ACTIVE | Noted: 2021-09-23

## 2021-09-23 RX ORDER — METOPROLOL SUCCINATE 50 MG/1
50 TABLET, EXTENDED RELEASE ORAL DAILY
Qty: 90 TABLET | Refills: 3 | Status: SHIPPED | OUTPATIENT
Start: 2021-09-23 | End: 2021-10-31

## 2021-09-23 RX ORDER — HYDROCODONE BITARTRATE AND ACETAMINOPHEN 5; 325 MG/1; MG/1
1 TABLET ORAL EVERY 6 HOURS PRN
Qty: 40 TABLET | Refills: 0 | Status: ON HOLD | OUTPATIENT
Start: 2021-09-23 | End: 2021-11-22

## 2021-09-28 ENCOUNTER — LAB (OUTPATIENT)
Dept: LAB | Facility: CLINIC | Age: 71
End: 2021-09-28
Payer: COMMERCIAL

## 2021-09-28 ENCOUNTER — OFFICE VISIT (OUTPATIENT)
Dept: ORTHOPEDICS | Facility: CLINIC | Age: 71
End: 2021-09-28
Payer: COMMERCIAL

## 2021-09-28 ENCOUNTER — THERAPY VISIT (OUTPATIENT)
Dept: PHYSICAL THERAPY | Facility: CLINIC | Age: 71
End: 2021-09-28
Payer: COMMERCIAL

## 2021-09-28 VITALS — HEIGHT: 67 IN | BODY MASS INDEX: 26.21 KG/M2 | WEIGHT: 167 LBS

## 2021-09-28 DIAGNOSIS — M54.41 CHRONIC BILATERAL LOW BACK PAIN WITH BILATERAL SCIATICA: ICD-10-CM

## 2021-09-28 DIAGNOSIS — M48.061 SPINAL STENOSIS OF LUMBAR REGION, UNSPECIFIED WHETHER NEUROGENIC CLAUDICATION PRESENT: ICD-10-CM

## 2021-09-28 DIAGNOSIS — M47.817 SPONDYLOSIS WITHOUT MYELOPATHY OR RADICULOPATHY, LUMBOSACRAL REGION: ICD-10-CM

## 2021-09-28 DIAGNOSIS — M54.42 CHRONIC BILATERAL LOW BACK PAIN WITH BILATERAL SCIATICA: ICD-10-CM

## 2021-09-28 DIAGNOSIS — G89.29 CHRONIC BILATERAL LOW BACK PAIN WITH BILATERAL SCIATICA: ICD-10-CM

## 2021-09-28 DIAGNOSIS — R39.15 URINARY URGENCY: Primary | ICD-10-CM

## 2021-09-28 DIAGNOSIS — E87.6 HYPOPOTASSEMIA: ICD-10-CM

## 2021-09-28 DIAGNOSIS — R39.15 URINARY URGENCY: ICD-10-CM

## 2021-09-28 DIAGNOSIS — M54.2 CERVICALGIA: ICD-10-CM

## 2021-09-28 LAB
ALBUMIN UR-MCNC: NEGATIVE MG/DL
APPEARANCE UR: CLEAR
BILIRUB UR QL STRIP: NEGATIVE
COLOR UR AUTO: YELLOW
GLUCOSE UR STRIP-MCNC: NEGATIVE MG/DL
HGB UR QL STRIP: NEGATIVE
KETONES UR STRIP-MCNC: NEGATIVE MG/DL
LEUKOCYTE ESTERASE UR QL STRIP: ABNORMAL
NITRATE UR QL: NEGATIVE
PH UR STRIP: 6 [PH] (ref 5–7)
POTASSIUM BLD-SCNC: 3.1 MMOL/L (ref 3.4–5.3)
RBC URINE: 2 /HPF
SP GR UR STRIP: 1.01 (ref 1–1.03)
SQUAMOUS EPITHELIAL: <1 /HPF
UROBILINOGEN UR STRIP-MCNC: NORMAL MG/DL
WBC URINE: 10 /HPF

## 2021-09-28 PROCEDURE — 84132 ASSAY OF SERUM POTASSIUM: CPT | Performed by: PATHOLOGY

## 2021-09-28 PROCEDURE — 97110 THERAPEUTIC EXERCISES: CPT | Mod: GP | Performed by: PHYSICAL THERAPY ASSISTANT

## 2021-09-28 PROCEDURE — 36415 COLL VENOUS BLD VENIPUNCTURE: CPT | Performed by: PATHOLOGY

## 2021-09-28 PROCEDURE — 99214 OFFICE O/P EST MOD 30 MIN: CPT | Performed by: FAMILY MEDICINE

## 2021-09-28 PROCEDURE — 81001 URINALYSIS AUTO W/SCOPE: CPT | Performed by: PATHOLOGY

## 2021-09-28 ASSESSMENT — MIFFLIN-ST. JEOR: SCORE: 1315.14

## 2021-09-28 NOTE — LETTER
9/28/2021      RE: Nadira Perez  [address]  [facility] [place] [address]       ESTABLISHED PATIENT FOLLOW-UP:  RECHECK (Back)       HISTORY OF PRESENT ILLNESS  Ms. Perez is a pleasant 71 year old year old female who presents to clinic today for follow-up of GREGOR.    Date of injury/onset: Chronic  Date last seen: 8/26/21  Following Therapeutic Plan: Yes  Pain: Improving  Function: Improving  Interval History: Overall she has gotten a lot of relief from her injections.  No longer relying on her wheeled walker as frequent.  Has returned to PT and very happy with this visit.  Also notes improvement in her pain/function with bilateral L5-S1 facet injections.      In regard to cervical sx, improved overall.  Noted she found looking down at ipad for hours has contributed and simply improving the ergonomics of screen has relieved her radicular symptoms mentioned at last visit.     Notes feeling urinary urgency, has labs and wishes to add UA.  Understands to message PCP asap since they will treat her if positive.    Bilateral hip pain at trochanteric regions.  Has noted difficulty laying on sides of hips especially right side.  Has to modify sleeping positions to avoid this pain.        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     Corneal opacity     Hypothyroidism     Osteopenia, unspecified location     Aromatase inhibitor use     Chronic pain of right knee     DDD (degenerative disc disease),  lumbar     Degenerative scoliosis in adult patient     Spondylosis without myelopathy or radiculopathy, lumbosacral region       Current Outpatient Medications   Medication Sig Dispense Refill     HYDROcodone-acetaminophen (NORCO) 5-325 MG tablet Take 1 tablet by mouth every 6 hours as needed for severe pain 40 tablet 0     Acetaminophen (APAP 500 PO) Take 1,000 mg by mouth 3 times daily as needed        amLODIPine (NORVASC) 10 MG tablet Take 1 tablet (10 mg) by mouth daily 90 tablet 3     anastrozole (ARIMIDEX) 1 MG tablet Take 1 tablet (1 mg) by mouth daily 90 tablet 3     Ascorbic Acid (VITAMIN C) 500 MG CHEW Take 1 tablet by mouth daily       atorvastatin (LIPITOR) 80 MG tablet Take 1 tablet (80 mg) by mouth daily 90 tablet 3     cholecalciferol 2000 units CAPS Take 6,000 Units by mouth daily 270 capsule 3     CRANBERRY EXTRACT PO Take 650 mg by mouth daily       cyclobenzaprine (FLEXERIL) 10 MG tablet Take 1 tablet (10 mg) by mouth 3 times daily as needed for muscle spasms 40 tablet 3     gabapentin (NEURONTIN) 300 MG capsule Take 1-2 capsules by mouth every 8 hours 540 capsule 3     ibuprofen (ADVIL/MOTRIN) 200 MG tablet Take 600 mg by mouth every 8 hours as needed for moderate pain        levothyroxine (SYNTHROID/LEVOTHROID) 112 MCG tablet TAKE 1/2 TABLET BY MOUTH DAILY 45 tablet 3     lisinopril (ZESTRIL) 40 MG tablet Take 0.5 tablets (20 mg) by mouth 2 times daily 90 tablet 3     Magnesium Oxide 250 MG TABS Take 1 tablet by mouth daily       melatonin 5 MG tablet Take 10 mg by mouth daily       metoclopramide (REGLAN) 5 MG tablet Take 1 tablet (5 mg) by mouth 3 times daily as needed (nausea) Avoid taking on a daily basis 30 tablet 3     metoprolol succinate ER (TOPROL-XL) 50 MG 24 hr tablet Take 1 tablet (50 mg) by mouth daily 90 tablet 3     Multiple Vitamin (MULTI-VITAMIN) per tablet Take 1 tablet by mouth daily.       Omega-3 Fatty Acids (OMEGA-3 FISH OIL PO) Take 2 g by mouth daily        ondansetron  (ZOFRAN-ODT) 4 MG ODT tab Take 1 tablet (4 mg) by mouth every 12 hours as needed for nausea 180 tablet 3     potassium chloride ER (KLOR-CON M) 20 MEQ CR tablet Take 20 meq four times a week. 90 tablet 3     terbinafine (LAMISIL AT) 1 % external cream Apply topically 2 times daily For fungal infection not resolved with other antifungals (e.g. Clotrimazole) 24 g 11     tiZANidine (ZANAFLEX) 2 MG tablet Take 1-2 tablets (2-4 mg) by mouth 3 times daily as needed for muscle spasms 90 tablet 1     triamcinolone (KENALOG) 0.1 % external ointment Apply topically 2 times daily 30 g 11     triamterene-HCTZ (MAXZIDE-25) 37.5-25 MG tablet Take 1 tablet by mouth daily 90 tablet 3     Vaginal Lubricant (REPHRESH) GEL Place 2 g vaginally every 3 days 14 Box 3     VOLTAREN 1 % topical gel Apply 4 grams to knees or 2 grams to hands four times daily using enclosed dosing card. 100 g 1       Allergies   Allergen Reactions     Penicillins Hives       Family History   Problem Relation Age of Onset     Heart Disease Father         AAA     Hypertension Father      Neurologic Disorder Mother         Anuerysm of Cerebral Artery, Dementia     Diabetes Mother      Thyroid Disease Mother         ,     Cerebrovascular Disease Mother      Dementia Mother      Osteoporosis Mother      Diabetes Maternal Grandmother      Asthma Maternal Grandmother      Diabetes Maternal Aunt         x2     Melanoma Maternal Aunt      Glaucoma Maternal Aunt      Circulatory Brother         Perihperal Neurophathy     Dementia Other      Cancer Other         malignant melanoma     Hypertension Other      Hypertension Other      Cerebrovascular Disease Other      Cerebrovascular Disease Other      Obesity Other      Respiratory Other      Chronic Obstructive Pulmonary Disease Maternal Grandfather         father     Asthma Maternal Grandfather      Breast Cancer Cousin      Cancer Other      Diabetes Other      Asthma Other      Macular Degeneration No family hx of       Coronary Artery Disease No family hx of      Hyperlipidemia No family hx of      Kidney Disease No family hx of      Thrombosis No family hx of      Arthritis No family hx of      Depression No family hx of      Mental Illness No family hx of      Substance Abuse No family hx of      Cystic Fibrosis No family hx of      Early Death No family hx of      Coronary Artery Disease Early Onset No family hx of      Heart Failure No family hx of      Bleeding Diathesis No family hx of      Ovarian Cancer No family hx of      Uterine Cancer No family hx of      Prostate Cancer No family hx of      Colorectal Cancer No family hx of      Pancreatic Cancer No family hx of      Lung Cancer No family hx of      Other Cancer No family hx of      Autoimmune Disease No family hx of      Unknown/Adopted No family hx of      Genetic Disorder No family hx of        Additional medical/Social/Surgical histories reviewed in [facility] and updated as appropriate.       PHYSICAL EXAM  There were no vitals taken for this visit.    General  - normal appearance, in no obvious distress  HEENT  - conjunctivae not injected, moist mucous membranes  CV  - normal peripheral perfusion  Pulm  - normal respiratory pattern, non-labored  Musculoskeletal - cervical spine  - inspection: normal bone and joint alignment, no obvious kyphosis  - palpation: no paravertebral or bony tenderness  - ROM: normal and painless flexion, extension, left and right sidebending and rotation  - strength: upper extremities 5/5 in all planes  - special tests:  (-) Spurling bilaterally  (-) carpal compression tests  (-) Tinels wrist  Musculoskeletal - lumbar spine  - stance: slow to rise and sit  - inspection: normal bone and joint alignment, no obvious scoliosis  - palpation: Minimal tenderness at L5-S1 facets  - ROM: ROM improved with decreased pain, limited in all planes however.  - strength: lower extremities 5/5 in all planes  - special tests:  (-) straight leg raise  bilaterally  General  - normal appearance, in no obvious distress  Musculoskeletal - hips  - stance: normal gait without limp  - inspection: no swelling or effusion, normal bone and joint alignment, no obvious deformity  - palpation: tender over the greater trochanters  - ROM: pain with active abduction, normal and painless flexion, extension, IR, ER  - strength: 5/5 in all planes  - special tests:  (-) CINDY  (-) FADIR  Neuro  - no sensory or motor deficit of left upper  Or lower extremity, grossly normal coordination, normal muscle tone. Altered sensation of lateral right lower leg in L5 distribution.  Skin  - no ecchymosis, erythema, warmth, or induration, no obvious rash.    Psych  - interactive, appropriate, normal mood and affect    IMAGING : XR PELVIS1/24/2020. Final results and radiologist's interpretation, available in the [facility] health record. Images were reviewed with the patient/family members in the office today. My personal interpretation of the performed imaging is minimal significant DJD of hips     ASSESSMENT & PLAN  Ms. Perez is a 71 year old year old female who presents to clinic today with RECHECK (Back)    Diagnosis:  Lumbar stenosis with neuroclaudication  Lumbar facet arthritis  Trochanteric bursitis of bilateral hips  Cervicalgia  Urinary urgency.    -Facet injections repeated provided excellent relief at this time, consider L5-S1 interlaminar GREGOR if pain recurs or radicular predominant component. She has met with Dr. Ismael Ramirez and agreed to hold off on surgery based on 2020 MRI.  Current MRI with progression of L5-S1 transforaminal stenosis and disc herniation.  Consider return to Dr. Ramirez if red flags arise. Discussion had in depth and will continue to manage conservatively as long as safe to do so.  -Continue with home exercise/Pilates and formal PT for hip/core and lumbar strengthening  -Trochanteric exercises provided, would like to hold off on corticosteroid injections at this  juncture, may consider if no improvement from HEP/PT in 4-6 weeks  -Cervical based pain with intermittent radiculitis has improved with HEP and altered ergonomics, to continue monitoring and consider MRI if recurs  -Follow up 4-6 weeks.    It was a pleasure seeing Miguel Borjas DO, [facility]  Primary Care Sports Medicine          Willis Borjas DO

## 2021-09-28 NOTE — PATIENT INSTRUCTIONS
Trochanteric Bursitis / Gluteal Tendinopathy    WHAT IS TROCHANTERIC BURSITIS?    Bursitis is irritation or inflammation of the bursa. A bursa is a fluid-filled sac that acts as a cushion between tendons, bones, and skin. There is a bump on the outer side of the upper part of the thigh bone (femur) called the greater trochanter. The trochanteric bursa is located over the greater trochanter. When this bursa is inflamed it is called trochanteric bursitis.          WHAT IS THE CAUSE?     The trochanteric bursa may be inflamed by a group of muscles or tendons rubbing over the bursa and causing friction against the thigh bone. Your iliotibial band goes from the iliac crest of your pelvis down the outer side of your thigh and attaches just below the knee. A tight iliotibial band can lead to trochanteric bursitis. This injury can occur with running, walking, or bicycling, especially when the bicycle seat is too high.    Trochanteric bursitis may also be caused by a fall, by a spine disorder, by differences in the length of your legs, or as a complication of hip surgery.    WHAT ARE THE SYMPTOMS?    You have pain on the upper outer area of your thigh or on the side of your hip. The pain is worse when you walk, bicycle, or go up or down stairs. You have pain when you move your thigh bone and feel tenderness in the area over the greater trochanter.    HOW IS IT DIAGNOSED?    Your healthcare provider will ask about your symptoms and examine your hip and thigh.    HOW IS IT TREATED?    To treat this condition:    Put an ice pack, gel pack, or package of frozen vegetables wrapped in a cloth on the painful area every 3 to 4 hours for up to 20 minutes at a time until the pain goes away.  Take an anti-inflammatory medicine, such as ibuprofen, as directed by your provider. Nonsteroidal anti-inflammatory medicines (NSAIDs) may cause stomach bleeding and other problems. These risks increase with age. Read the label and take as  directed. Unless recommended by your healthcare provider, do not take for more than 10 days.  Follow your provider s instructions for doing exercises to help you recover.    While you are recovering from your injury you will need to change your sport or activity to one that does not make your condition worse. For example, you may need to swim instead of running or bicycling. If you are bicycling, you may need to lower your bicycle seat.    A bursa that is only mildly inflamed and has just started to hurt may improve within a few weeks. A bursa that is significantly inflamed and has been painful for a long time may take up to a few months to improve. You need to stop doing the activities that cause pain until your bursa has healed.    Follow your healthcare provider's instructions. Ask your provider:    How and when you will hear your test results  How long it will take to recover  What activities you should avoid and when you can return to your normal activities  How to take care of yourself at home  What symptoms or problems you should watch for and what to do if you have them  Make sure you know when you should come back for a checkup.    HOW CAN I HELP PREVENT TROCHANTERIC BURSITIS?    Trochanteric bursitis is best prevented by warming up properly and stretching the muscles on the outer side of your upper thigh.    Developed by [employer].  Published by [employer].  Copyright  2014 [employer] and/or one of its subsidiaries. All rights reserved.    You can do the first 3 stretches to begin stretching the muscles that run along the outside of your hip. You can do the strengthening exercises when the sharp pain lessens.    STRETCHING EXERCISES    Gluteal stretch: Lie on your back with both knees bent. Rest the ankle on your injured side over the knee of your other leg. Grasp the thigh of the leg on the uninjured side and pull toward your chest. You will feel a stretch along the buttocks on the injured  side and possibly along the outside of your hip. Hold the stretch for 15 to 30 seconds. Repeat 3 times.    Iliotibial band stretch, standing: Cross your uninjured leg in front of the other leg and bend down and reach toward the inside of your back foot. Do not bend your knees. Hold this position for 15 to 30 seconds. Return to the starting position. Repeat 3 times.  Iliotibial band stretch, side-leaning: Stand sideways near a wall with your injured side closest to the wall. Place a hand on the wall for support. Cross the leg farther from the wall over the other leg. Keep the foot closest to the wall flat on the floor. Lean your hips into the wall. Hold the stretch for 15 to 30 seconds. Repeat 3 times.    STRENGTHENING EXERCISES    Straight leg raise: Lie on your back with your legs straight out in front of you. Bend the knee on your uninjured side and place the foot flat on the floor. Tighten the thigh muscle on your injured side and lift your leg about 8 inches off the floor. Keep your leg straight and your thigh muscle tight. Slowly lower your leg back down to the floor. Do 2 sets of 15.    Prone hip extension: Lie on your stomach with your legs straight out behind you. Fold your arms under your head and rest your head on your arms. Draw your belly button in towards your spine and tighten your abdominal muscles. Tighten the buttocks and thigh muscles of the leg on your injured side and lift the leg off the floor about 8 inches. Keep your leg straight. Hold for 5 seconds. Then lower your leg and relax. Do 2 sets of 15.    Side-lying leg lift: Lie on your uninjured side. Tighten the front thigh muscles on your injured leg and lift that leg 8 to 10 inches (20 to 25 centimeters) away from the other leg. Keep the leg straight and lower it slowly. Do 2 sets of 15.    Wall squat with a ball: Stand with your back, shoulders, and head against a wall. Look straight ahead. Keep your shoulders relaxed and your feet 3 feet (90  centimeters) from the wall and shoulder's width apart. Place a soccer or basketball-sized ball behind your back. Keeping your back against the wall, slowly squat down to a 45-degree angle. Your thighs will not yet be parallel to the floor. Hold this position for 10 seconds and then slowly slide back up the wall. Repeat 10 times. Build up to 2 sets of 15.    Clam exercise: Lie on your uninjured side with your hips and knees bent and feet together. Slowly raise your top leg toward the ceiling while keeping your heels touching each other. Hold for 2 seconds and lower slowly. Do 2 sets of 15 repetitions.    Side plank: Lie on your side with your legs, hips, and shoulders in a straight line. Prop yourself up onto your forearm with your elbow directly under your shoulder. Lift your hips off the floor and balance on your forearm and the outside of your foot. Try to hold this position for 15 seconds and then slowly lower your hip to the ground. Switch sides and repeat. Work up to holding for 1 minute. This exercise can be made easier by starting with your knees and hips flexed toward your chest.    The plank: Lie on your stomach resting on our forearms. With your legs straight, lift your hips off the floor until they are in line with your shoulders. Support yourself on your forearms and toes. Hold this position for 15 seconds. (If this is too difficult, you can modify it by placing your knees on the floor.) Repeat 3 times. Work up to increasing your hold time to 30 to 60 seconds.    Developed by [employer].  Published by [employer].  Copyright  2014 [employer] and/or one of its subsidiaries. All rights reserved.

## 2021-09-28 NOTE — PROGRESS NOTES
ESTABLISHED PATIENT FOLLOW-UP:  RECHECK (Back)       HISTORY OF PRESENT ILLNESS  Ms. Perez is a pleasant 71 year old year old female who presents to clinic today for follow-up of GREGOR.    Date of injury/onset: Chronic  Date last seen: 8/26/21  Following Therapeutic Plan: Yes  Pain: Improving  Function: Improving  Interval History: Overall she has gotten a lot of relief from her injections.  No longer relying on her wheeled walker as frequent.  Has returned to PT and very happy with this visit.  Also notes improvement in her pain/function with bilateral L5-S1 facet injections.      In regard to cervical sx, improved overall.  Noted she found looking down at ipad for hours has contributed and simply improving the ergonomics of screen has relieved her radicular symptoms mentioned at last visit.     Notes feeling urinary urgency, has labs and wishes to add UA.  Understands to message PCP asap since they will treat her if positive.    Bilateral hip pain at trochanteric regions.  Has noted difficulty laying on sides of hips especially right side.  Has to modify sleeping positions to avoid this pain.        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     Corneal opacity     Hypothyroidism     Osteopenia, unspecified location     Aromatase inhibitor use     Chronic pain of right knee     DDD (degenerative disc disease), lumbar     Degenerative scoliosis in adult patient     Spondylosis without myelopathy or  radiculopathy, lumbosacral region       Current Outpatient Medications   Medication Sig Dispense Refill     HYDROcodone-acetaminophen (NORCO) 5-325 MG tablet Take 1 tablet by mouth every 6 hours as needed for severe pain 40 tablet 0     Acetaminophen (APAP 500 PO) Take 1,000 mg by mouth 3 times daily as needed        amLODIPine (NORVASC) 10 MG tablet Take 1 tablet (10 mg) by mouth daily 90 tablet 3     anastrozole (ARIMIDEX) 1 MG tablet Take 1 tablet (1 mg) by mouth daily 90 tablet 3     Ascorbic Acid (VITAMIN C) 500 MG CHEW Take 1 tablet by mouth daily       atorvastatin (LIPITOR) 80 MG tablet Take 1 tablet (80 mg) by mouth daily 90 tablet 3     cholecalciferol 2000 units CAPS Take 6,000 Units by mouth daily 270 capsule 3     CRANBERRY EXTRACT PO Take 650 mg by mouth daily       cyclobenzaprine (FLEXERIL) 10 MG tablet Take 1 tablet (10 mg) by mouth 3 times daily as needed for muscle spasms 40 tablet 3     gabapentin (NEURONTIN) 300 MG capsule Take 1-2 capsules by mouth every 8 hours 540 capsule 3     ibuprofen (ADVIL/MOTRIN) 200 MG tablet Take 600 mg by mouth every 8 hours as needed for moderate pain        levothyroxine (SYNTHROID/LEVOTHROID) 112 MCG tablet TAKE 1/2 TABLET BY MOUTH DAILY 45 tablet 3     lisinopril (ZESTRIL) 40 MG tablet Take 0.5 tablets (20 mg) by mouth 2 times daily 90 tablet 3     Magnesium Oxide 250 MG TABS Take 1 tablet by mouth daily       melatonin 5 MG tablet Take 10 mg by mouth daily       metoclopramide (REGLAN) 5 MG tablet Take 1 tablet (5 mg) by mouth 3 times daily as needed (nausea) Avoid taking on a daily basis 30 tablet 3     metoprolol succinate ER (TOPROL-XL) 50 MG 24 hr tablet Take 1 tablet (50 mg) by mouth daily 90 tablet 3     Multiple Vitamin (MULTI-VITAMIN) per tablet Take 1 tablet by mouth daily.       Omega-3 Fatty Acids (OMEGA-3 FISH OIL PO) Take 2 g by mouth daily        ondansetron (ZOFRAN-ODT) 4 MG ODT tab Take 1 tablet (4 mg) by mouth every 12 hours as needed for nausea  180 tablet 3     potassium chloride ER (KLOR-CON M) 20 MEQ CR tablet Take 20 meq four times a week. 90 tablet 3     terbinafine (LAMISIL AT) 1 % external cream Apply topically 2 times daily For fungal infection not resolved with other antifungals (e.g. Clotrimazole) 24 g 11     tiZANidine (ZANAFLEX) 2 MG tablet Take 1-2 tablets (2-4 mg) by mouth 3 times daily as needed for muscle spasms 90 tablet 1     triamcinolone (KENALOG) 0.1 % external ointment Apply topically 2 times daily 30 g 11     triamterene-HCTZ (MAXZIDE-25) 37.5-25 MG tablet Take 1 tablet by mouth daily 90 tablet 3     Vaginal Lubricant (REPHRESH) GEL Place 2 g vaginally every 3 days 14 Box 3     VOLTAREN 1 % topical gel Apply 4 grams to knees or 2 grams to hands four times daily using enclosed dosing card. 100 g 1       Allergies   Allergen Reactions     Penicillins Hives       Family History   Problem Relation Age of Onset     Heart Disease Father         AAA     Hypertension Father      Neurologic Disorder Mother         Anuerysm of Cerebral Artery, Dementia     Diabetes Mother      Thyroid Disease Mother         ,     Cerebrovascular Disease Mother      Dementia Mother      Osteoporosis Mother      Diabetes Maternal Grandmother      Asthma Maternal Grandmother      Diabetes Maternal Aunt         x2     Melanoma Maternal Aunt      Glaucoma Maternal Aunt      Circulatory Brother         Perihperal Neurophathy     Dementia Other      Cancer Other         malignant melanoma     Hypertension Other      Hypertension Other      Cerebrovascular Disease Other      Cerebrovascular Disease Other      Obesity Other      Respiratory Other      Chronic Obstructive Pulmonary Disease Maternal Grandfather         father     Asthma Maternal Grandfather      Breast Cancer Cousin      Cancer Other      Diabetes Other      Asthma Other      Macular Degeneration No family hx of      Coronary Artery Disease No family hx of      Hyperlipidemia No family hx of      Kidney  Disease No family hx of      Thrombosis No family hx of      Arthritis No family hx of      Depression No family hx of      Mental Illness No family hx of      Substance Abuse No family hx of      Cystic Fibrosis No family hx of      Early Death No family hx of      Coronary Artery Disease Early Onset No family hx of      Heart Failure No family hx of      Bleeding Diathesis No family hx of      Ovarian Cancer No family hx of      Uterine Cancer No family hx of      Prostate Cancer No family hx of      Colorectal Cancer No family hx of      Pancreatic Cancer No family hx of      Lung Cancer No family hx of      Other Cancer No family hx of      Autoimmune Disease No family hx of      Unknown/Adopted No family hx of      Genetic Disorder No family hx of        Additional medical/Social/Surgical histories reviewed in [facility] and updated as appropriate.       PHYSICAL EXAM  There were no vitals taken for this visit.    General  - normal appearance, in no obvious distress  HEENT  - conjunctivae not injected, moist mucous membranes  CV  - normal peripheral perfusion  Pulm  - normal respiratory pattern, non-labored  Musculoskeletal - cervical spine  - inspection: normal bone and joint alignment, no obvious kyphosis  - palpation: no paravertebral or bony tenderness  - ROM: normal and painless flexion, extension, left and right sidebending and rotation  - strength: upper extremities 5/5 in all planes  - special tests:  (-) Spurling bilaterally  (-) carpal compression tests  (-) Tinels wrist  Musculoskeletal - lumbar spine  - stance: slow to rise and sit  - inspection: normal bone and joint alignment, no obvious scoliosis  - palpation: Minimal tenderness at L5-S1 facets  - ROM: ROM improved with decreased pain, limited in all planes however.  - strength: lower extremities 5/5 in all planes  - special tests:  (-) straight leg raise bilaterally  General  - normal appearance, in no obvious distress  Musculoskeletal - hips  -  stance: normal gait without limp  - inspection: no swelling or effusion, normal bone and joint alignment, no obvious deformity  - palpation: tender over the greater trochanters  - ROM: pain with active abduction, normal and painless flexion, extension, IR, ER  - strength: 5/5 in all planes  - special tests:  (-) CINDY  (-) FADIR  Neuro  - no sensory or motor deficit of left upper  Or lower extremity, grossly normal coordination, normal muscle tone. Altered sensation of lateral right lower leg in L5 distribution.  Skin  - no ecchymosis, erythema, warmth, or induration, no obvious rash.    Psych  - interactive, appropriate, normal mood and affect    IMAGING : XR PELVIS1/24/2020. Final results and radiologist's interpretation, available in the [facility] health record. Images were reviewed with the patient/family members in the office today. My personal interpretation of the performed imaging is minimal significant DJD of hips     ASSESSMENT & PLAN  Ms. Perez is a 71 year old year old female who presents to clinic today with RECHECK (Back)    Diagnosis:  Lumbar stenosis with neuroclaudication  Lumbar facet arthritis  Trochanteric bursitis of bilateral hips  Cervicalgia  Urinary urgency.    -Facet injections repeated provided excellent relief at this time, consider L5-S1 interlaminar GREGOR if pain recurs or radicular predominant component. She has met with Dr. Ismael Ramirez and agreed to hold off on surgery based on 2020 MRI.  Current MRI with progression of L5-S1 transforaminal stenosis and disc herniation.  Consider return to Dr. Ramirez if red flags arise. Discussion had in depth and will continue to manage conservatively as long as safe to do so.  -Continue with home exercise/Pilates and formal PT for hip/core and lumbar strengthening  -Trochanteric exercises provided, would like to hold off on corticosteroid injections at this juncture, may consider if no improvement from HEP/PT in 4-6 weeks  -Cervical based pain with  intermittent radiculitis has improved with HEP and altered ergonomics, to continue monitoring and consider MRI if recurs  -Follow up 4-6 weeks.    It was a pleasure seeing Nadira.    Willis Borjas DO, MALENAM  Primary Care Sports Medicine

## 2021-09-29 ENCOUNTER — OFFICE VISIT (OUTPATIENT)
Dept: OPHTHALMOLOGY | Facility: CLINIC | Age: 71
End: 2021-09-29
Attending: OPHTHALMOLOGY
Payer: COMMERCIAL

## 2021-09-29 DIAGNOSIS — H43.812 PVD (POSTERIOR VITREOUS DETACHMENT), LEFT EYE: ICD-10-CM

## 2021-09-29 DIAGNOSIS — Z96.1 PSEUDOPHAKIA OF BOTH EYES: ICD-10-CM

## 2021-09-29 DIAGNOSIS — H33.312 RETINAL TEAR OF LEFT EYE: Primary | ICD-10-CM

## 2021-09-29 DIAGNOSIS — H26.492 PCO (POSTERIOR CAPSULAR OPACIFICATION), LEFT: ICD-10-CM

## 2021-09-29 PROCEDURE — 92012 INTRM OPH EXAM EST PATIENT: CPT | Mod: GC | Performed by: OPHTHALMOLOGY

## 2021-09-29 PROCEDURE — G0463 HOSPITAL OUTPT CLINIC VISIT: HCPCS | Mod: 25

## 2021-09-29 PROCEDURE — 66821 AFTER CATARACT LASER SURGERY: CPT | Mod: LT | Performed by: OPHTHALMOLOGY

## 2021-09-29 ASSESSMENT — EXTERNAL EXAM - LEFT EYE: OS_EXAM: NORMAL

## 2021-09-29 ASSESSMENT — VISUAL ACUITY
OS_SC: 20/40
OS_PH_SC: 20/25
OD_SC: 20/20
METHOD: SNELLEN - LINEAR
OS_SC+: +1
OD_SC+: -1

## 2021-09-29 ASSESSMENT — CONF VISUAL FIELD
OS_NORMAL: 1
OD_NORMAL: 1
METHOD: COUNTING FINGERS

## 2021-09-29 ASSESSMENT — TONOMETRY
OD_IOP_MMHG: 18
IOP_METHOD: TONOPEN
OS_IOP_MMHG: 18

## 2021-09-29 ASSESSMENT — ENCOUNTER SYMPTOMS: JOINT SWELLING: 1

## 2021-09-29 ASSESSMENT — SLIT LAMP EXAM - LIDS
COMMENTS: NORMAL
COMMENTS: NORMAL

## 2021-09-29 ASSESSMENT — CUP TO DISC RATIO
OS_RATIO: 0.45
OD_RATIO: 0.4

## 2021-09-29 ASSESSMENT — EXTERNAL EXAM - RIGHT EYE: OD_EXAM: NORMAL

## 2021-09-29 NOTE — NURSING NOTE
Chief Complaints and History of Present Illnesses   Patient presents with     Follow Up     Chief Complaint(s) and History of Present Illness(es)     Follow Up     Laterality: both eyes    Associated symptoms: floaters (big blotch has gotten smaller.).  Negative for eye pain, pain with eye movement and flashes    Pain scale: 0/10              Comments     Pt here today for follow up.States eyes seem 70% better.  Big blotch seems smaller. Pt concerned about getting new glasses.  Would like to discuss whether it is time for a refraction.     Ocular meds = artificial tears     Anu SCOTT, LOLY 1:45 PM 09/29/2021

## 2021-09-29 NOTE — PROGRESS NOTES
CC: Blurry vision left eye     Interval History: LV 6/2021. She reports that since the PVD with the sub hyaloid hemorrhage left eye - she reports that now she still has a little of floaters / dark spot - now it getting smaller. No longer having flashes of lights. She is disturbed by the floaters in her eye. Not having flashes of lights.  Has YAG Capsulotomy left eye last visit.     HPI: Patient fell 9/24/2020.  Blurry vision left eye starting around 10/2/2020. Noted to have HST - now s/p barricade laser 1/18/2021. No pain, floaters are stable; denies flashes.     Assessment/plan:   1.  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 Retinal tear left eye   - see #1; s/p barricade laser 1/18/21    3. Pseudophakia each eye   - right eye: s/p yag capsulotomy   - left eye: s/p yag capsulotomy -- small membrane still anchored inferiorly: YAG caps today    4. Dry eye each eye  ATs PRN    5. ERM left eye  Mild; observe    RTC: 3-4 w for DFE    Jorge Mahmood MD - PGY3  Department of Ophthalmology  Pager: [phone]    Complete documentation of historical and exam elements from today's encounter can be found in the full encounter summary report (not reduplicated in this progress note). I personally obtained the chief complaint(s) and history of present illness.  I confirmed and edited as necessary the review of systems, past medical/surgical history, family history, social history, and examination findings as documented by others; and I examined the patient myself. I personally reviewed the relevant tests, images, and reports as documented above. I formulated and edited as necessary the assessment and plan and discussed the findings and management plan with the patient and family.    Felix Oliveros MD

## 2021-09-30 ENCOUNTER — HOSPITAL ENCOUNTER (OUTPATIENT)
Dept: PHYSICAL THERAPY | Facility: CLINIC | Age: 71
Setting detail: THERAPIES SERIES
End: 2021-09-30
Attending: PSYCHIATRY & NEUROLOGY
Payer: COMMERCIAL

## 2021-09-30 DIAGNOSIS — R26.89 IMBALANCE: ICD-10-CM

## 2021-09-30 PROCEDURE — 97161 PT EVAL LOW COMPLEX 20 MIN: CPT | Mod: GP | Performed by: PHYSICAL THERAPIST

## 2021-10-04 ENCOUNTER — HOSPITAL ENCOUNTER (OUTPATIENT)
Dept: PHYSICAL THERAPY | Facility: CLINIC | Age: 71
Setting detail: THERAPIES SERIES
End: 2021-10-04
Attending: PSYCHIATRY & NEUROLOGY
Payer: COMMERCIAL

## 2021-10-04 DIAGNOSIS — M67.431 BILATERAL GANGLION CYSTS OF WRISTS: Primary | ICD-10-CM

## 2021-10-04 DIAGNOSIS — M54.50 LUMBAR PAIN: Primary | ICD-10-CM

## 2021-10-04 DIAGNOSIS — M67.432 BILATERAL GANGLION CYSTS OF WRISTS: Primary | ICD-10-CM

## 2021-10-04 PROCEDURE — 97112 NEUROMUSCULAR REEDUCATION: CPT | Mod: GP | Performed by: PHYSICAL THERAPIST

## 2021-10-04 NOTE — TELEPHONE ENCOUNTER
RECORDS RECEIVED FROM: 1.5 inch Ganglion cyst Rt wrist, small ganglion on left, Numb/tingling/pain 1st-3rd Rt fingers occasionally.  Wrote in My Chart   DATE RECEIVED: Oct 11, 2021     NOTES STATUS DETAILS   OFFICE NOTE from referring provider Internal    XRAYS (IMAGES & REPORTS) Internal 10/29/20  1/2/20  12/20/18

## 2021-10-05 ENCOUNTER — THERAPY VISIT (OUTPATIENT)
Dept: PHYSICAL THERAPY | Facility: CLINIC | Age: 71
End: 2021-10-05
Payer: COMMERCIAL

## 2021-10-05 DIAGNOSIS — M47.817 SPONDYLOSIS WITHOUT MYELOPATHY OR RADICULOPATHY, LUMBOSACRAL REGION: ICD-10-CM

## 2021-10-05 PROCEDURE — 97110 THERAPEUTIC EXERCISES: CPT | Mod: GP | Performed by: PHYSICAL THERAPY ASSISTANT

## 2021-10-07 DIAGNOSIS — C50.919 MALIGNANT NEOPLASM OF FEMALE BREAST, UNSPECIFIED ESTROGEN RECEPTOR STATUS, UNSPECIFIED LATERALITY, UNSPECIFIED SITE OF BREAST (H): Primary | ICD-10-CM

## 2021-10-11 ENCOUNTER — ANCILLARY PROCEDURE (OUTPATIENT)
Dept: GENERAL RADIOLOGY | Facility: CLINIC | Age: 71
End: 2021-10-11
Attending: STUDENT IN AN ORGANIZED HEALTH CARE EDUCATION/TRAINING PROGRAM
Payer: OTHER MISCELLANEOUS

## 2021-10-11 ENCOUNTER — OFFICE VISIT (OUTPATIENT)
Dept: ORTHOPEDICS | Facility: CLINIC | Age: 71
End: 2021-10-11
Payer: OTHER MISCELLANEOUS

## 2021-10-11 ENCOUNTER — PRE VISIT (OUTPATIENT)
Dept: ORTHOPEDICS | Facility: CLINIC | Age: 71
End: 2021-10-11

## 2021-10-11 DIAGNOSIS — M67.431 BILATERAL GANGLION CYSTS OF WRISTS: ICD-10-CM

## 2021-10-11 DIAGNOSIS — G56.03 BILATERAL CARPAL TUNNEL SYNDROME: Primary | ICD-10-CM

## 2021-10-11 DIAGNOSIS — M67.432 BILATERAL GANGLION CYSTS OF WRISTS: ICD-10-CM

## 2021-10-11 PROCEDURE — 99214 OFFICE O/P EST MOD 30 MIN: CPT | Performed by: STUDENT IN AN ORGANIZED HEALTH CARE EDUCATION/TRAINING PROGRAM

## 2021-10-11 PROCEDURE — 73110 X-RAY EXAM OF WRIST: CPT | Mod: LT | Performed by: RADIOLOGY

## 2021-10-11 ASSESSMENT — ENCOUNTER SYMPTOMS
SMELL DISTURBANCE: 0
PARALYSIS: 0
SEIZURES: 0
STIFFNESS: 1
TROUBLE SWALLOWING: 1
LOSS OF CONSCIOUSNESS: 0
DECREASED APPETITE: 0
SPEECH CHANGE: 0
NECK PAIN: 1
WEIGHT GAIN: 0
SINUS CONGESTION: 0
LEG PAIN: 1
HOT FLASHES: 0
TREMORS: 0
SLEEP DISTURBANCES DUE TO BREATHING: 0
FLANK PAIN: 0
EXERCISE INTOLERANCE: 1
SINUS CONGESTION: 0
NECK PAIN: 1
BACK PAIN: 1
ALTERED TEMPERATURE REGULATION: 0
NECK MASS: 0
NIGHT SWEATS: 0
SORE THROAT: 0
DECREASED LIBIDO: 1
WEIGHT LOSS: 1
LOSS OF CONSCIOUSNESS: 0
ORTHOPNEA: 0
LEG PAIN: 1
DYSURIA: 0
FEVER: 0
TASTE DISTURBANCE: 0
HOARSE VOICE: 0
MUSCLE WEAKNESS: 1
NECK MASS: 0
FEVER: 0
SYNCOPE: 0
TROUBLE SWALLOWING: 1
ORTHOPNEA: 0
DECREASED LIBIDO: 1
MEMORY LOSS: 0
SMELL DISTURBANCE: 0
PALPITATIONS: 1
MYALGIAS: 1
POLYPHAGIA: 0
NUMBNESS: 1
TINGLING: 1
STIFFNESS: 1
HEMATURIA: 0
HEMATURIA: 0
DIZZINESS: 1
SINUS PAIN: 0
INCREASED ENERGY: 0
PARALYSIS: 0
WEAKNESS: 1
MUSCLE CRAMPS: 1
SEIZURES: 0
WEIGHT LOSS: 1
ALTERED TEMPERATURE REGULATION: 0
SLEEP DISTURBANCES DUE TO BREATHING: 0
HYPERTENSION: 1
SYNCOPE: 0
SINUS PAIN: 0
DYSURIA: 0
HALLUCINATIONS: 0
JOINT SWELLING: 0
DIFFICULTY URINATING: 1
HYPERTENSION: 1
NIGHT SWEATS: 0
HYPOTENSION: 0
DISTURBANCES IN COORDINATION: 1
WEAKNESS: 1
CHILLS: 0
HOT FLASHES: 0
ARTHRALGIAS: 1
FLANK PAIN: 0
TASTE DISTURBANCE: 0
NUMBNESS: 1
INCREASED ENERGY: 0
POLYPHAGIA: 0
HEADACHES: 0
POLYDIPSIA: 1
DIFFICULTY URINATING: 1
HEADACHES: 0
MUSCLE CRAMPS: 1
LIGHT-HEADEDNESS: 1
BACK PAIN: 1
TREMORS: 0
PALPITATIONS: 1
TINGLING: 1
FATIGUE: 1
ARTHRALGIAS: 1
DIZZINESS: 1
CHILLS: 0
DISTURBANCES IN COORDINATION: 1
DECREASED APPETITE: 0
EXERCISE INTOLERANCE: 1
LIGHT-HEADEDNESS: 1
SPEECH CHANGE: 0
POLYDIPSIA: 1
MUSCLE WEAKNESS: 1
MEMORY LOSS: 0
JOINT SWELLING: 0
HALLUCINATIONS: 0
MYALGIAS: 1
HOARSE VOICE: 0
SORE THROAT: 0
HYPOTENSION: 0
WEIGHT GAIN: 0
FATIGUE: 1

## 2021-10-11 NOTE — NURSING NOTE
Reason For Visit:   Chief Complaint   Patient presents with     Consult     Bilateral ganglion cysts , rnumbness, tingling, and pain  in right1-3 fingers       Primary MD: Matilde Quiñonez  Ref. MD: self referred     ?  No    Age: 71 year old      Currently working? Yes.  Work status?  Retired.  Date of injury: NA  Type of injury: NA.  Date of surgery: 2011 and 2013   Type of surgery: left wrist scaphoid excision, four bone fusion, iliac crest bone graft , Left Hand Screw Removal  Estimated blood loss   Smoker: No  Request smoking cessation information: No      There were no vitals taken for this visit.      Pain Assessment  Patient Currently in Pain: No (numbness and tingling)    Hand Dominance Evaluation  Hand Dominance: Right          QuickDASH Assessment  QuickDASH Main 12/22/2016   1.Open a tight or new jar. Severe difficulty   2. Do heavy household chores (e.g., wash walls, floors) Moderate difficulty   3. Carry a shopping bag or briefcase. Moderate difficulty   4. Wash your back. Mild difficulty   5. Use a knife to cut food. Mild difficulty   6. Recreational activities in which you take some force or impact through your arm, shoulder or hand (e.g., golf, hammering, tennis, etc.). Unable   7. During the past week, to what extent has your arm, shoulder or hand problem interfered with your normal social activities with family, friends, neighbours or groups? Slightly   8. During the past week, were you limited in your work or other regular daily activities as a result of your arm, shoulder or hand problem? Slightly limited   9. Arm, shoulder or hand pain. Moderate   10.Tingling (pins and needles) in your arm,shoulder or hand. None   11. During the past week, how much difficulty have you had sleeping because of the pain in your arm, shoulder or hand? ([place] number) No difficulty   Quickdash Ability Score 38.63   1. Open a tight or new jar. -   2. Do heavy household chores (e.g., wash walls, floors).  -   3. Carry a shopping bag or briefcase. -   4. Wash your back. -   5. Use a knife to cut food. -   6. Recreational activities in which you take some force or impact through your arm, shoulder or hand (e.g., golf, hammering, tennis, etc.). -   7. During the past week, to what extent has your arm, shoulder or hand problem interfered with your normal social activities with family, friends, neighbours or groups? -   8. During the past week, were you limited in your work or other regular daily activities as a result of your arm, shoulder or hand problem? -   9. Arm, shoulder or hand pain. -   10. Tingling (pins and needles) in your arm,shoulder or hand. -   11. During the past week, how much difficulty have you had sleeping because of the pain in your arm, shoulder or hand? ([place] number) -   SUM: -          Allergies   Allergen Reactions     Penicillins Hives       Mary Del Cid, ATC

## 2021-10-11 NOTE — LETTER
10/11/2021         RE: Nadira Perez  [id number]  [facility] [address]        Dear Colleague,    Thank you for referring your patient, Nadira Perez, to the [facility] [place]. Please see a copy of my visit note below.    Orthopaedic Surgery Hand and Upper Extremity Clinic Progress Note:  10/11/2021     Patient Name: Nadira Perez  MRN: [id number]    CHIEF COMPLAINT:  Bilateral hand numbness and tingling, R > L    HPI:  Ms. Nadira Perez is a 71 year old old female right hand dominant who presents with numbness in the thumb, index, and middle fingers of both hands for at least 3 months.  In the last few weeks she has had increased pain in the same distribution, worse on the right.  She has difficulty gripping and feeling with her hands.  She denies explicit nocturnal symptoms that wake her up.  She does wear wrist braces given her history of bilateral wrist surgeries.  She does have neck problems and scoliosis and is consulting soon with one of our spine surgeons regarding the need for surgery.    She has a history of bilateral scaphoidectomy's and 4 corner fusions performed by Dr. Schuyler Mendez. She does still have wrist pain with ROM but this is at baseline.  She has previously seen Dr. Prajapati for this as well as wrist swelling and received steroid injections in bilateral radiocarpal joints, last 10/2020. She endorses increased soft tissue swelling over her dorsal right wrist, nontender but states it has seemed to get larger over last few months. It does not fluctuate in size.      The patient's past medical, family, and social history was reviewed and confirmed.    REVIEW OF SYMPTOMS:      General: Negative   Eyes: Negative   Ear, Nose and Throat: Negative   Respiratory: Negative   Cardiovascular: Negative   Gastrointestinal: Negative   Genito-urinary: Negative   Musculoskeletal: Negative  Neurological: Negative   Psychological: Negative  HEME:  Negative   ENDO: Negative   SKIN: Negative    VITALS:  There were no vitals filed for this visit.    EXAM:  General: NAD, A&Ox3  HEENT: NC/AT  CV: RRR by peripheral pulse  Pulmonary: Non-labored breathing on RA  RUE:  Well-healed dorsal wrist incision  Soft, compressible soft tissue swelling dorsal wrist just distal to incision, nontender, seems to envelop but does not move with extensor tendons.  Wrist extension 35, wrist flexion 90, baseline  Marked APB atrophy   Intact EPL/FPL/APB/HI  Diminished sensation median nerve distribution compared to ulnar and compared to contralateral. Normal sensation radial  2 point discrimination > 10 mm median, <5mm ulnar and radial  Negative Tinel's  +Durkan's compression test at carpal tunnel   Negative Spurling's    LUE:  Well-healed dorsal wrist incision  Wrist extension 45, wrist flexion 80, baseline  Marked APB atrophy  Intact EPL/FPL/APB/HI  Mildly diminished sensation median nerve distribution compared to ulnar. Normal sensation radial  2 point discrimination 5 mm median, < 5mm ulnar and radial  Negative Tinel's  Negative Durkan's compression test at carpal tunnel   Negative Spurling's    IMAGING:    XRs of bilateral hands were obtained today and compared to prior XRs from 10/2020. Both fusions appear well-healed.    There has been interval migration of one of the screws that appears to be loose within the dorsal joint, which also seemed to be the case on last films. Dorsal soft tissue shadow is seen that appears to be confluent with the joint.     No significant change of left hand XRs.      I have personally reviewed the above images and labs.         IMPRESSION AND RECOMMENDATIONS:  Ms. Nadria Perez is a 71 year old year old female right hand dominant s/p bilateral scaphoidectomies and four corner fusions who presents today with the chief complaint of numbness and pain in the median nerve distribution, worse on the right.    Symptoms and findings consistent with  carpal tunnel syndrome. Presentation is slightly atypical, and given spine issues, will order EMG/NCS to evaluate for cervical radiculopathy and carpal tunnel syndrome.    She will follow-up after electrodiagnostic studies completed for possible surgical discussion.    We discussed the patient's diagnosis and treatment options in detail today. This included a description of the associated pathology and non-operative/operative treatment options with the use of illustrations and diagrams.      Rolan Conley MD    Hand, Upper Extremity & Microvascular Surgery  Department of Orthopaedic Surgery  [facility]

## 2021-10-11 NOTE — PROGRESS NOTES
Orthopaedic Surgery Hand and Upper Extremity Clinic Progress Note:  10/11/2021     Patient Name: Nadira Perez  MRN: [id number]    CHIEF COMPLAINT:  Bilateral hand numbness and tingling, R > L    HPI:  Ms. Nadira Perez is a 71 year old old female right hand dominant who presents with numbness in the thumb, index, and middle fingers of both hands for at least 3 months.  In the last few weeks she has had increased pain in the same distribution, worse on the right.  She has difficulty gripping and feeling with her hands.  She denies explicit nocturnal symptoms that wake her up.  She does wear wrist braces given her history of bilateral wrist surgeries.  She does have neck problems and scoliosis and is consulting soon with one of our spine surgeons regarding the need for surgery.    She has a history of bilateral scaphoidectomy's and 4 corner fusions performed by Dr. Schuyler Mendez. She does still have wrist pain with ROM but this is at baseline.  She has previously seen Dr. Prajapati for this as well as wrist swelling and received steroid injections in bilateral radiocarpal joints, last 10/2020. She endorses increased soft tissue swelling over her dorsal right wrist, nontender but states it has seemed to get larger over last few months. It does not fluctuate in size.      The patient's past medical, family, and social history was reviewed and confirmed.    REVIEW OF SYMPTOMS:      General: Negative   Eyes: Negative   Ear, Nose and Throat: Negative   Respiratory: Negative   Cardiovascular: Negative   Gastrointestinal: Negative   Genito-urinary: Negative   Musculoskeletal: Negative  Neurological: Negative   Psychological: Negative  HEME: Negative   ENDO: Negative   SKIN: Negative    VITALS:  There were no vitals filed for this visit.    EXAM:  General: NAD, A&Ox3  HEENT: NC/AT  CV: RRR by peripheral pulse  Pulmonary: Non-labored breathing on RA  RUE:  Well-healed dorsal wrist incision  Soft, compressible soft  tissue swelling dorsal wrist just distal to incision, nontender, seems to envelop but does not move with extensor tendons.  Wrist extension 35, wrist flexion 90, baseline  Marked APB atrophy   Intact EPL/FPL/APB/HI  Diminished sensation median nerve distribution compared to ulnar and compared to contralateral. Normal sensation radial  2 point discrimination > 10 mm median, <5mm ulnar and radial  Negative Tinel's  +Durkan's compression test at carpal tunnel   Negative Spurling's    LUE:  Well-healed dorsal wrist incision  Wrist extension 45, wrist flexion 80, baseline  Marked APB atrophy  Intact EPL/FPL/APB/HI  Mildly diminished sensation median nerve distribution compared to ulnar. Normal sensation radial  2 point discrimination 5 mm median, < 5mm ulnar and radial  Negative Tinel's  Negative Durkan's compression test at carpal tunnel   Negative Spurling's    IMAGING:    XRs of bilateral hands were obtained today and compared to prior XRs from 10/2020. Both fusions appear well-healed.    There has been interval migration of one of the screws that appears to be loose within the dorsal joint, which also seemed to be the case on last films. Dorsal soft tissue shadow is seen that appears to be confluent with the joint.     No significant change of left hand XRs.      I have personally reviewed the above images and labs.         IMPRESSION AND RECOMMENDATIONS:  Ms. Nadira Perez is a 71 year old year old female right hand dominant s/p bilateral scaphoidectomies and four corner fusions who presents today with the chief complaint of numbness and pain in the median nerve distribution, worse on the right.    Symptoms and findings consistent with carpal tunnel syndrome. Presentation is slightly atypical, and given spine issues, will order EMG/NCS to evaluate for cervical radiculopathy and carpal tunnel syndrome.    She will follow-up after electrodiagnostic studies completed for possible surgical discussion.    We  discussed the patient's diagnosis and treatment options in detail today. This included a description of the associated pathology and non-operative/operative treatment options with the use of illustrations and diagrams.      Rolan Conley MD    Hand, Upper Extremity & Microvascular Surgery  Department of Orthopaedic Surgery  [facility]

## 2021-10-13 ENCOUNTER — MYC MEDICAL ADVICE (OUTPATIENT)
Dept: INTERNAL MEDICINE | Facility: CLINIC | Age: 71
End: 2021-10-13

## 2021-10-15 ENCOUNTER — ANCILLARY PROCEDURE (OUTPATIENT)
Dept: GENERAL RADIOLOGY | Facility: CLINIC | Age: 71
End: 2021-10-15
Attending: ORTHOPAEDIC SURGERY
Payer: COMMERCIAL

## 2021-10-15 ENCOUNTER — OFFICE VISIT (OUTPATIENT)
Dept: ORTHOPEDICS | Facility: CLINIC | Age: 71
End: 2021-10-15
Payer: COMMERCIAL

## 2021-10-15 ENCOUNTER — ANCILLARY PROCEDURE (OUTPATIENT)
Dept: GENERAL RADIOLOGY | Facility: CLINIC | Age: 71
End: 2021-10-15
Attending: PHYSICIAN ASSISTANT
Payer: COMMERCIAL

## 2021-10-15 ENCOUNTER — ANCILLARY PROCEDURE (OUTPATIENT)
Dept: CT IMAGING | Facility: CLINIC | Age: 71
End: 2021-10-15
Attending: PHYSICIAN ASSISTANT
Payer: COMMERCIAL

## 2021-10-15 VITALS — HEIGHT: 67 IN | WEIGHT: 167 LBS | BODY MASS INDEX: 26.21 KG/M2

## 2021-10-15 DIAGNOSIS — M43.16 SPONDYLOLISTHESIS OF LUMBAR REGION: ICD-10-CM

## 2021-10-15 DIAGNOSIS — M41.50 DEGENERATIVE SCOLIOSIS IN ADULT PATIENT: ICD-10-CM

## 2021-10-15 DIAGNOSIS — M48.062 SPINAL STENOSIS OF LUMBAR REGION WITH NEUROGENIC CLAUDICATION: ICD-10-CM

## 2021-10-15 DIAGNOSIS — M41.50 DEGENERATIVE SCOLIOSIS IN ADULT PATIENT: Primary | ICD-10-CM

## 2021-10-15 PROCEDURE — 72082 X-RAY EXAM ENTIRE SPI 2/3 VW: CPT | Performed by: STUDENT IN AN ORGANIZED HEALTH CARE EDUCATION/TRAINING PROGRAM

## 2021-10-15 PROCEDURE — 99204 OFFICE O/P NEW MOD 45 MIN: CPT | Mod: GC | Performed by: PHYSICIAN ASSISTANT

## 2021-10-15 PROCEDURE — 72110 X-RAY EXAM L-2 SPINE 4/>VWS: CPT | Mod: 59 | Performed by: RADIOLOGY

## 2021-10-15 PROCEDURE — 72131 CT LUMBAR SPINE W/O DYE: CPT | Mod: GC | Performed by: STUDENT IN AN ORGANIZED HEALTH CARE EDUCATION/TRAINING PROGRAM

## 2021-10-15 PROCEDURE — 77073 BONE LENGTH STUDIES: CPT | Performed by: STUDENT IN AN ORGANIZED HEALTH CARE EDUCATION/TRAINING PROGRAM

## 2021-10-15 ASSESSMENT — MIFFLIN-ST. JEOR: SCORE: 1315.14

## 2021-10-15 NOTE — PROGRESS NOTES
Spine Surgery Return Clinic Visit      Chief Complaint:   RECHECK (follow up low back pain, last seen 2020, has been having more back spasms and increased symptoms )      Interval HPI:  Symptom Profile Including: location of symptoms, onset, severity, exacerbating/alleviating factors, previous treatments:        Nadira Perez is a 71 year old female who presents today for reevaluation of chronic bilateral low back pain.  Patient was last seen in clinic on 1/28/2020 At which time she is exhibiting slow back pain and neurogenic claudication symptoms.  Dr. Ramirez recommended lumbar brace, Tylenol and anti-inflammatories as needed, lumbar physical therapy, and right sided L4-5 and L5-S1 facet injections.  She is not interested in surgical intervention at that time.    Today, patient states that she continues to have severe low back pain.  Her right side used to bother her more but recently in the last side has been worse.  Back pain is associated with right sided radicular symptoms in L5 distribution.  She has hypersensitivity to touch in lateral calf into the top of the foot.  She does occasionally have left lower extremity symptoms in the same distribution.  Back pain and symptoms are worse with standing and walking and with extension of the spine.  Symptoms are improved with forward flexion of the lumbar spine while seated.  She has completed physical therapy as instructed with Dr. Ramirez and also is continuing to stay active using Pilates and .  She takes 1000 mg of Tylenol 3 times daily.  Takes 600 mg Advil 3 times daily.  Takes tizanidine as needed.  Had injections which were helpful, but did not last extremely long.  Patient recently had updated MRI and is very worried about the progression of severity of imaging findings and worried about prognosis.    Of note, patient is currently being worked up for bilateral carpal tunnel syndrome.  This is very bothersome to her.  Her right wrist is  worse than the left.  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     Thyroid disease             Past Surgical History:     Past Surgical History:   Procedure Laterality Date     ABDOMEN SURGERY      ovarian cyst, mesh     ARTHRODESIS WRIST       ARTHRODESIS WRIST  2/14/2013    Procedure: ARTHRODESIS WRIST;  left wrist scaphoid excision, four bone fusion, iliac crest bone graft  ( Mac with block);  Surgeon: vA Mendez MD;  [facility]     BIOPSY      skin, vaginal     BIOPSY OF SKIN LESION       CATARACT IOL, RT/LT Right 03/13/2018     CATARACT IOL, RT/LT Left 02/20/2018     COLONOSCOPY  12/24/2013    Procedure: COMBINED COLONOSCOPY, SINGLE BIOPSY/POLYPECTOMY BY BIOPSY;  COLONOSCOPY;  Surgeon: Dom Alvarez MD;  Location:  GI     COSMETIC SURGERY       ESOPHAGOSCOPY, GASTROSCOPY, DUODENOSCOPY (EGD), COMBINED N/A 11/23/2016    Procedure: COMBINED ESOPHAGOSCOPY, GASTROSCOPY, DUODENOSCOPY (EGD);  Surgeon: Quinten Feliciano MD;  Location:  GI     EXTERNAL EAR SURGERY      right     EYE SURGERY      radial keratomy     GRAFT BONE FROM ILIAC CREST  2/14/2013    Procedure: GRAFT BONE FROM ILIAC CREST;  mac with block and local infilitration;  Surgeon: Av Mendez MD;  [facility]     HC BREATH HYDROGEN TEST N/A 10/14/2016    Procedure: HYDROGEN BREATH TEST;   Surgeon: Cheri Barron MD;  Location:  GI     HERNIA REPAIR      UMBILICAL     HERNIA REPAIR      umbilical age 18 mos.     HERNIA REPAIR       HERNIORRHAPHY UMBILICAL  1/31/1952     HYSTERECTOMY TOTAL ABDOMINAL  5/3/00     HYSTERECTOMY TOTAL ABDOMINAL  5/3/2000     MASTECTOMY      PROPHYLACTIC RIGHT BREAST     MASTECTOMY MODIFIED RADICAL      LEFT BREAST     MASTECTOMY MODIFIED RADICAL      bilateral; right breast prophylactic     PARATHYROIDECTOMY  9/23/04    R SUPERIOR     PARATHYROIDECTOMY  9/23/2004    parathyroid resection, subtotal     REMOVE HARDWARE HAND  9/24/2013    Procedure: REMOVE HARDWARE HAND;  Left Hand Screw Removal        RHINOPLASTY  12/31/1985     RHINOPLASTY  12/31/1985     thyr proc skin closed cosmetic manner by subcuticular stitch  1/23/2009     THYROPLASTY  10/09/2009     THYROPLASTY  10/09/2009     TONSILLECTOMY  3/1/68     TONSILLECTOMY  3/1/1968     WRIST SURGERY      wrist arthrodesis            Social History:     Social History     Tobacco Use     Smoking status: Never Smoker     Smokeless tobacco: Never Used   Substance Use Topics     Alcohol use: Not Currently     Comment: Rare to occasional            Family History:     Family History   Problem Relation Age of Onset     Heart Disease Father         AAA     Hypertension Father      Neurologic Disorder Mother         Anuerysm of Cerebral Artery, Dementia     Diabetes Mother      Thyroid Disease Mother         ,     Cerebrovascular Disease Mother      Dementia Mother      Osteoporosis Mother      Diabetes Maternal Grandmother      Asthma Maternal Grandmother      Diabetes Maternal Aunt         x2     Melanoma Maternal Aunt      Glaucoma Maternal Aunt      Circulatory Brother         Perihperal Neurophathy     Dementia Other      Cancer Other         malignant melanoma     Hypertension Other      Hypertension Other      Cerebrovascular Disease Other      Cerebrovascular Disease Other      Obesity Other       Respiratory Other      Chronic Obstructive Pulmonary Disease Maternal Grandfather         father     Asthma Maternal Grandfather      Breast Cancer Cousin      Cancer Other      Diabetes Other      Asthma Other      Macular Degeneration No family hx of      Coronary Artery Disease No family hx of      Hyperlipidemia No family hx of      Kidney Disease No family hx of      Thrombosis No family hx of      Arthritis No family hx of      Depression No family hx of      Mental Illness No family hx of      Substance Abuse No family hx of      Cystic Fibrosis No family hx of      Early Death No family hx of      Coronary Artery Disease Early Onset No family hx of      Heart Failure No family hx of      Bleeding Diathesis No family hx of      Ovarian Cancer No family hx of      Uterine Cancer No family hx of      Prostate Cancer No family hx of      Colorectal Cancer No family hx of      Pancreatic Cancer No family hx of      Lung Cancer No family hx of      Other Cancer No family hx of      Autoimmune Disease No family hx of      Unknown/Adopted No family hx of      Genetic Disorder No family hx of             Allergies:     Allergies   Allergen Reactions     Penicillins Hives            Medications:     Current Outpatient Medications   Medication     Acetaminophen (APAP 500 PO)     amLODIPine (NORVASC) 10 MG tablet     anastrozole (ARIMIDEX) 1 MG tablet     Ascorbic Acid (VITAMIN C) 500 MG CHEW     atorvastatin (LIPITOR) 80 MG tablet     cholecalciferol 2000 units CAPS     CRANBERRY EXTRACT PO     cyclobenzaprine (FLEXERIL) 10 MG tablet     gabapentin (NEURONTIN) 300 MG capsule     HYDROcodone-acetaminophen (NORCO) 5-325 MG tablet     ibuprofen (ADVIL/MOTRIN) 200 MG tablet     levothyroxine (SYNTHROID/LEVOTHROID) 112 MCG tablet     lisinopril (ZESTRIL) 40 MG tablet     Magnesium Oxide 250 MG TABS     melatonin 5 MG tablet     metoclopramide (REGLAN) 5 MG tablet     metoprolol succinate ER (TOPROL-XL) 50 MG 24 hr tablet  "    Multiple Vitamin (MULTI-VITAMIN) per tablet     Omega-3 Fatty Acids (OMEGA-3 FISH OIL PO)     ondansetron (ZOFRAN-ODT) 4 MG ODT tab     potassium chloride ER (KLOR-CON M) 20 MEQ CR tablet     terbinafine (LAMISIL AT) 1 % external cream     tiZANidine (ZANAFLEX) 2 MG tablet     triamcinolone (KENALOG) 0.1 % external ointment     triamterene-HCTZ (MAXZIDE-25) 37.5-25 MG tablet     Vaginal Lubricant (REPHRESH) GEL     VOLTAREN 1 % topical gel     Current Facility-Administered Medications   Medication     lidocaine (PF) (XYLOCAINE) 1 % injection 2 mL     lidocaine (PF) (XYLOCAINE) 1 % injection 2 mL     lidocaine 1 % injection 2 mL     lidocaine 1 % injection 2 mL     triamcinolone (KENALOG-40) injection 40 mg     triamcinolone (KENALOG-40) injection 40 mg     triamcinolone (KENALOG-40) injection 40 mg     triamcinolone (KENALOG-40) injection 40 mg             Review of Systems:   A focused musculoskeletal and neurologic ROS was performed with pertinent positives and negatives noted in the HPI.  Additional systems were also reviewed and are documented at the bottom of the note.         Physical Exam:   Vitals: Ht 1.702 m (5' 7\")   Wt 75.8 kg (167 lb)   BMI 26.16 kg/m    PHYSICAL EXAM:   Constitutional - Patient is healthy, well-nourished and appears stated age.    Vitals: There were no vitals taken for this visit.   Respiratory - Patient is breathing normally and in no respiratory distress.   Skin - No suspicious rashes or lesions.   Psychiatric - Normal mood and affect.   Cardiovascular - Extremities warm and well perfused.   Eyes - Visual acuity is normal to the written word.   ENT - Hearing intact to the spoken word.   GI - No abdominal distention.   Musculoskeletal - Non-antalgic gait without use of assistive devices. Unable to do tandem gait with ease                  Lumbar Spine:    Appearance - right scoliosis curvature.    Palpation - Non-tender to palpation midline, paraspinals, bilateral PSIS    ROM - " Full ; painful extension    Motor -        LOWER EXTREMITY Left Right   Hip flexion 5/5 5/5   Knee flexion 5/5 5/5   Knee extension 5/5 5/5   Ankle dorsiflexion 5/5 4+/5   Ankle plantarflexion 5/5 5/5   Great toe extension 4/5 4/5        Special tests -     Straight leg raise - negative       Neurologic -hypersensitivity in right lateral calf area.  Paresthesias in stocking distribution bilateral feet starting at about the level of the ankle.  Patella and Achilles reflexes +1 bilaterally.  0 beats clonus bilaterally..      Hip Exam:  No pain with hip log roll and no tenderness over the greater trochanters.    Alignment:  Patient stands with a neutral standing sagittal balance.         Imaging:   We ordered and independently reviewed new radiographs at this clinic visit. The results were discussed with the patient. Findings include:     10/15/2021 XR lumbar spine AP/lateral and flexion and extension views: Multilevel degenerative changes throughout lumbar spine. Degenerative scoliosis with right curve measured at 20 degrees from L2-L4. Sclerotic changes to bilateral SI joints. No significant hip arthritic changes. L5-S1 spondylolisthesis without significant motion between flexion/extension.     9/4/2021 MRI lumbar spine without contrast: Disc bulge with extrusion at L5-S1 resulting in left lateral recess and L>R foraminal stenosis; significantly worse Than MRI from 12/20/2019. L4-5 right greater than left foraminal and lateral recess stenosis. L3-4 left greater than right foraminal stenosis. No significant spinal canal stenosis throughout lumbar spine.     Assessment and Plan:     71 year old female with lumbar spondylosis , L5-S1 spondylolisthesis with worsening of bilateral lateral recess and foraminal stenosis noted on MRI.    Reviewed XR and MR images today.  Specifically, reviewed the MRI today compared to that of 2019.  There is significant increase in disc degeneration and Endplate changes compared to 2019.   This is associated with increased nerve compression bilaterally.  This is clearly the source of her low back and neurogenic claudication symptoms.  She has already exhausted nonoperative treatment at this time.  Discussed that the prognosis for this type of problem is that it will continue to progress with time, and as it progresses it will become harder and harder to fix problem.  Discussed that there are various surgical options available based on reviewing her images.  Option #1 would be a major surgery to address the degenerative changes throughout the lumbar spine, likely L2 to pelvis fusion.  Discussed that this is a major surgery with long operative and recovery time.  The alternative surgical intervention would be to address only the L5-S1 level which we believe is the cause of the majority of her symptoms.  The main risk of only going after this level is that there is a possibility we would need to bring her back to surgery in 1 to 2 years for advanced degeneration and symptom development from mother levels above.  Patient would like to proceed with the less invasive surgery at this time, and we believe this is an appropriate approach.    Risks of this surgery include risk of infection, risk of dural tear resulting in CSF leak which might result in headaches, or possible need for lumbar drain, or possible revision surgery in the setting of a persistent leak. Risk of hematoma or seroma resulting in wound complications.  Possible nerve root injury resulting in numbness weakness or paralysis into the arms or legs. Possible radiculitis which could result in similar symptoms or could result in significant neurogenic type pain. There is also a risk of delayed onset nerve root pals or radiculitis, which I explained is potentially due to stretch injury or possibly due to delayed onset swelling, and is sometimes temporary but can also be permanent.  Risk of incomplete decompression which might require revision  surgery in the future.  Risk of adjacent segment problems requiring surgery in the future. Risk of incomplete relief of symptoms possibly requiring revision surgery in the future. Furthermore, although rare, there are risks of major vessel injury such as to the major vessels anteriorly or to the bowel from the surgery.  Sometimes this can happen if an instrument is passed anteriorly through the disc space. There is a risk of nonunion which might require revision surgery in the future, and risk of hardware complications from the instrumentation.  I do use imaging to help guide the placement of the instrumentation, but even with this there is a chance of implant malposition or problem.  There is a risk of blood clots in the legs or the lungs.  Lastly, although rare, there are certainly risks of the anesthetic including stroke heart attack and death.      We also discussed expected recovery time for patient patient asked multiple intelligent questions which were answered to her understanding.  Patient would like to proceed with surgical intervention at this time.  We will get the preoperative planning steps started today.    PAC referral  Vascular referral for discussion with Dr. Moreno, co-surgeon  CT lumbar spine without contrast  EOS full spine standing AP/lateral view images  Continue with Prolia treatment for osteoporosis  Surgery: OLIF/PSF L5-S1    Patient seen and plan discussed with Dr. Ramirez.   Alexa Merino (cristóbal Higgins), SAIDA    Respectfully,  Serge Ramirez MD  Spine Surgery  [facility]    Attending MD (Dr. Serge Ramirez) :  I reviewed and verified the history and physical exam of the patient and discussed the patient's management with the other clinical providers involved in this patient's care including any involved residents or physicians assistants. I reviewed the above note and agree with the documented findings and plan of care, which were communicated to the patient.       Serge Ramirez MD

## 2021-10-15 NOTE — NURSING NOTE
"Reason For Visit:   Chief Complaint   Patient presents with     RECHECK     follow up low back pain, last seen 2020, has been having more back spasms and increased symptoms        Primary MD: Matilde Quiñonez  Ref. MD: Self     ?  No  Date of surgery: none   Type of surgery: none .  Smoker: No  Request smoking cessation information: No    Ht 1.702 m (5' 7\")   Wt 75.8 kg (167 lb)   BMI 26.16 kg/m           Oswestry (JOHN) Questionnaire    OSWESTRY DISABILITY INDEX 10/15/2021   Count 9   Sum 21   Oswestry Score (%) 46.67   Some recent data might be hidden            Neck Disability Index (NDI) Questionnaire    No flowsheet data found.                Promis 10 Assessment    PROMIS 10 10/15/2021   In general, would you say your health is: Good   In general, would you say your quality of life is: Good   In general, how would you rate your physical health? Fair   In general, how would you rate your mental health, including your mood and your ability to think? Very good   In general, how would you rate your satisfaction with your social activities and relationships? Very good   In general, please rate how well you carry out your usual social activities and roles Very good   To what extent are you able to carry out your everyday physical activities such as walking, climbing stairs, carrying groceries, or moving a chair? Moderately   How often have you been bothered by emotional problems such as feeling anxious, depressed or irritable? Rarely   How would you rate your fatigue on average? Moderate   How would you rate your pain on average?   0 = No Pain  to  10 = Worst Imaginable Pain 4   In general, would you say your health is: 3   In general, would you say your quality of life is: 3   In general, how would you rate your physical health? 2   In general, how would you rate your mental health, including your mood and your ability to think? 4   In general, how would you rate your satisfaction with your social " activities and relationships? 4   In general, please rate how well you carry out your usual social activities and roles. (This includes activities at home, at work and in your community, and responsibilities as a parent, child, spouse, employee, friend, etc.) 4   To what extent are you able to carry out your everyday physical activities such as walking, climbing stairs, carrying groceries, or moving a chair? 3   In the past 7 days, how often have you been bothered by emotional problems such as feeling anxious, depressed, or irritable? 2   In the past 7 days, how would you rate your fatigue on average? 3   In the past 7 days, how would you rate your pain on average, where 0 means no pain, and 10 means worst imaginable pain? 4   Global Mental Health Score 15   Global Physical Health Score 11   PROMIS TOTAL - SUBSCORES 26   Some recent data might be hidden                Quang Schroeder ATC

## 2021-10-15 NOTE — LETTER
10/15/2021         RE: Nadira Perez  [address]  [facility] [address]        Dear Colleague,    Thank you for referring your patient, Nadira Perez, to the [facility] [place]. Please see a copy of my visit note below.    Spine Surgery Return Clinic Visit      Chief Complaint:   RECHECK (follow up low back pain, last seen 2020, has been having more back spasms and increased symptoms )      Interval HPI:  Symptom Profile Including: location of symptoms, onset, severity, exacerbating/alleviating factors, previous treatments:        Nadira Perez is a 71 year old female who presents today for reevaluation of chronic bilateral low back pain.  Patient was last seen in clinic on 1/28/2020 At which time she is exhibiting slow back pain and neurogenic claudication symptoms.  Dr. Ramirez recommended lumbar brace, Tylenol and anti-inflammatories as needed, lumbar physical therapy, and right sided L4-5 and L5-S1 facet injections.  She is not interested in surgical intervention at that time.    Today, patient states that she continues to have severe low back pain.  Her right side used to bother her more but recently in the last side has been worse.  Back pain is associated with right sided radicular symptoms in L5 distribution.  She has hypersensitivity to touch in lateral calf into the top of the foot.  She does occasionally have left lower extremity symptoms in the same distribution.  Back pain and symptoms are worse with standing and walking and with extension of the spine.  Symptoms are improved with forward flexion of the lumbar spine while seated.  She has completed physical therapy as instructed with Dr. Ramirez and also is continuing to stay active using Pilates and .  She takes 1000 mg of Tylenol 3 times daily.  Takes 600 mg Advil 3 times daily.  Takes tizanidine as needed.  Had injections which were helpful, but did not last extremely long.   Patient recently had updated MRI and is very worried about the progression of severity of imaging findings and worried about prognosis.    Of note, patient is currently being worked up for bilateral carpal tunnel syndrome.  This is very bothersome to her.  Her right wrist is worse than the left.  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     Thyroid disease             Past Surgical History:     Past Surgical History:   Procedure Laterality Date     ABDOMEN SURGERY      ovarian cyst, mesh     ARTHRODESIS WRIST       ARTHRODESIS WRIST  2/14/2013    Procedure: ARTHRODESIS WRIST;  left wrist scaphoid excision, four bone fusion, iliac crest bone graft  ( Mac with block);  Surgeon: Av Mendez MD;  [facility]     BIOPSY      skin, vaginal     BIOPSY OF SKIN LESION       CATARACT IOL, RT/LT Right 03/13/2018     CATARACT IOL, RT/LT Left 02/20/2018     COLONOSCOPY  12/24/2013    Procedure: COMBINED COLONOSCOPY, SINGLE BIOPSY/POLYPECTOMY BY BIOPSY;  COLONOSCOPY;  Surgeon: Dom Alvarez MD;  Location:  GI     COSMETIC SURGERY       ESOPHAGOSCOPY, GASTROSCOPY, DUODENOSCOPY (EGD), COMBINED N/A 11/23/2016    Procedure: COMBINED ESOPHAGOSCOPY, GASTROSCOPY, DUODENOSCOPY (EGD);  Surgeon: Quinten Feliciano MD;  Location:  GI     EXTERNAL EAR SURGERY      right     EYE SURGERY      radial  keratomy     GRAFT BONE FROM ILIAC CREST  2/14/2013    Procedure: GRAFT BONE FROM ILIAC CREST;  mac with block and local infilitration;  Surgeon: Av Mendez MD;  [facility]      BREATH HYDROGEN TEST N/A 10/14/2016    Procedure: HYDROGEN BREATH TEST;  Surgeon: Cheri Barron MD;  [facility]     HERNIA REPAIR      UMBILICAL     HERNIA REPAIR      umbilical age 18 mos.     HERNIA REPAIR       HERNIORRHAPHY UMBILICAL  1/31/1952     HYSTERECTOMY TOTAL ABDOMINAL  5/3/00     HYSTERECTOMY TOTAL ABDOMINAL  5/3/2000     MASTECTOMY      PROPHYLACTIC RIGHT BREAST     MASTECTOMY MODIFIED RADICAL      LEFT BREAST     MASTECTOMY MODIFIED RADICAL      bilateral; right breast prophylactic     PARATHYROIDECTOMY  9/23/04    R SUPERIOR     PARATHYROIDECTOMY  9/23/2004    parathyroid resection, subtotal     REMOVE HARDWARE HAND  9/24/2013    Procedure: REMOVE HARDWARE HAND;  Left Hand Screw Removal        RHINOPLASTY  12/31/1985     RHINOPLASTY  12/31/1985     thyr proc skin closed cosmetic manner by subcuticular stitch  1/23/2009     THYROPLASTY  10/09/2009     THYROPLASTY  10/09/2009     TONSILLECTOMY  3/1/68     TONSILLECTOMY  3/1/1968     WRIST SURGERY      wrist arthrodesis            Social History:     Social History     Tobacco Use     Smoking status: Never Smoker     Smokeless tobacco: Never Used   Substance Use Topics     Alcohol use: Not Currently     Comment: Rare to occasional            Family History:     Family History   Problem Relation Age of Onset     Heart Disease Father         AAA     Hypertension Father      Neurologic Disorder Mother         Anuerysm of Cerebral Artery, Dementia     Diabetes Mother      Thyroid Disease Mother         ,     Cerebrovascular Disease Mother      Dementia Mother      Osteoporosis Mother      Diabetes Maternal Grandmother      Asthma Maternal Grandmother      Diabetes Maternal Aunt         x2     Melanoma Maternal Aunt      Glaucoma Maternal  Aunt      Circulatory Brother         Perihperal Neurophathy     Dementia Other      Cancer Other         malignant melanoma     Hypertension Other      Hypertension Other      Cerebrovascular Disease Other      Cerebrovascular Disease Other      Obesity Other      Respiratory Other      Chronic Obstructive Pulmonary Disease Maternal Grandfather         father     Asthma Maternal Grandfather      Breast Cancer Cousin      Cancer Other      Diabetes Other      Asthma Other      Macular Degeneration No family hx of      Coronary Artery Disease No family hx of      Hyperlipidemia No family hx of      Kidney Disease No family hx of      Thrombosis No family hx of      Arthritis No family hx of      Depression No family hx of      Mental Illness No family hx of      Substance Abuse No family hx of      Cystic Fibrosis No family hx of      Early Death No family hx of      Coronary Artery Disease Early Onset No family hx of      Heart Failure No family hx of      Bleeding Diathesis No family hx of      Ovarian Cancer No family hx of      Uterine Cancer No family hx of      Prostate Cancer No family hx of      Colorectal Cancer No family hx of      Pancreatic Cancer No family hx of      Lung Cancer No family hx of      Other Cancer No family hx of      Autoimmune Disease No family hx of      Unknown/Adopted No family hx of      Genetic Disorder No family hx of             Allergies:     Allergies   Allergen Reactions     Penicillins Hives            Medications:     Current Outpatient Medications   Medication     Acetaminophen (APAP 500 PO)     amLODIPine (NORVASC) 10 MG tablet     anastrozole (ARIMIDEX) 1 MG tablet     Ascorbic Acid (VITAMIN C) 500 MG CHEW     atorvastatin (LIPITOR) 80 MG tablet     cholecalciferol 2000 units CAPS     CRANBERRY EXTRACT PO     cyclobenzaprine (FLEXERIL) 10 MG tablet     gabapentin (NEURONTIN) 300 MG capsule     HYDROcodone-acetaminophen (NORCO) 5-325 MG tablet     ibuprofen (ADVIL/MOTRIN)  "200 MG tablet     levothyroxine (SYNTHROID/LEVOTHROID) 112 MCG tablet     lisinopril (ZESTRIL) 40 MG tablet     Magnesium Oxide 250 MG TABS     melatonin 5 MG tablet     metoclopramide (REGLAN) 5 MG tablet     metoprolol succinate ER (TOPROL-XL) 50 MG 24 hr tablet     Multiple Vitamin (MULTI-VITAMIN) per tablet     Omega-3 Fatty Acids (OMEGA-3 FISH OIL PO)     ondansetron (ZOFRAN-ODT) 4 MG ODT tab     potassium chloride ER (KLOR-CON M) 20 MEQ CR tablet     terbinafine (LAMISIL AT) 1 % external cream     tiZANidine (ZANAFLEX) 2 MG tablet     triamcinolone (KENALOG) 0.1 % external ointment     triamterene-HCTZ (MAXZIDE-25) 37.5-25 MG tablet     Vaginal Lubricant (REPHRESH) GEL     VOLTAREN 1 % topical gel     Current Facility-Administered Medications   Medication     lidocaine (PF) (XYLOCAINE) 1 % injection 2 mL     lidocaine (PF) (XYLOCAINE) 1 % injection 2 mL     lidocaine 1 % injection 2 mL     lidocaine 1 % injection 2 mL     triamcinolone (KENALOG-40) injection 40 mg     triamcinolone (KENALOG-40) injection 40 mg     triamcinolone (KENALOG-40) injection 40 mg     triamcinolone (KENALOG-40) injection 40 mg             Review of Systems:   A focused musculoskeletal and neurologic ROS was performed with pertinent positives and negatives noted in the HPI.  Additional systems were also reviewed and are documented at the bottom of the note.         Physical Exam:   Vitals: Ht 1.702 m (5' 7\")   Wt 75.8 kg (167 lb)   BMI 26.16 kg/m    PHYSICAL EXAM:   Constitutional - Patient is healthy, well-nourished and appears stated age.    Vitals: There were no vitals taken for this visit.   Respiratory - Patient is breathing normally and in no respiratory distress.   Skin - No suspicious rashes or lesions.   Psychiatric - Normal mood and affect.   Cardiovascular - Extremities warm and well perfused.   Eyes - Visual acuity is normal to the written word.   ENT - Hearing intact to the spoken word.   GI - No abdominal " distention.   Musculoskeletal - Non-antalgic gait without use of assistive devices. Unable to do tandem gait with ease                  Lumbar Spine:    Appearance - right scoliosis curvature.    Palpation - Non-tender to palpation midline, paraspinals, bilateral PSIS    ROM - Full ; painful extension    Motor -        LOWER EXTREMITY Left Right   Hip flexion 5/5 5/5   Knee flexion 5/5 5/5   Knee extension 5/5 5/5   Ankle dorsiflexion 5/5 4+/5   Ankle plantarflexion 5/5 5/5   Great toe extension 4/5 4/5        Special tests -     Straight leg raise - negative       Neurologic -hypersensitivity in right lateral calf area.  Paresthesias in stocking distribution bilateral feet starting at about the level of the ankle.  Patella and Achilles reflexes +1 bilaterally.  0 beats clonus bilaterally..      Hip Exam:  No pain with hip log roll and no tenderness over the greater trochanters.    Alignment:  Patient stands with a neutral standing sagittal balance.         Imaging:   We ordered and independently reviewed new radiographs at this clinic visit. The results were discussed with the patient. Findings include:     10/15/2021 XR lumbar spine AP/lateral and flexion and extension views: Multilevel degenerative changes throughout lumbar spine. Degenerative scoliosis with right curve measured at 20 degrees from L2-L4. Sclerotic changes to bilateral SI joints. No significant hip arthritic changes. L5-S1 spondylolisthesis without significant motion between flexion/extension.     9/4/2021 MRI lumbar spine without contrast: Disc bulge with extrusion at L5-S1 resulting in left lateral recess and L>R foraminal stenosis; significantly worse Than MRI from 12/20/2019. L4-5 right greater than left foraminal and lateral recess stenosis. L3-4 left greater than right foraminal stenosis. No significant spinal canal stenosis throughout lumbar spine.     Assessment and Plan:     71 year old female with lumbar spondylosis , L5-S1  spondylolisthesis with worsening of bilateral lateral recess and foraminal stenosis noted on MRI.    Reviewed XR and MR images today.  Specifically, reviewed the MRI today compared to that of 2019.  There is significant increase in disc degeneration and Endplate changes compared to 2019.  This is associated with increased nerve compression bilaterally.  This is clearly the source of her low back and neurogenic claudication symptoms.  She has already exhausted nonoperative treatment at this time.  Discussed that the prognosis for this type of problem is that it will continue to progress with time, and as it progresses it will become harder and harder to fix problem.  Discussed that there are various surgical options available based on reviewing her images.  Option #1 would be a major surgery to address the degenerative changes throughout the lumbar spine, likely L2 to pelvis fusion.  Discussed that this is a major surgery with long operative and recovery time.  The alternative surgical intervention would be to address only the L5-S1 level which we believe is the cause of the majority of her symptoms.  The main risk of only going after this level is that there is a possibility we would need to bring her back to surgery in 1 to 2 years for advanced degeneration and symptom development from mother levels above.  Patient would like to proceed with the less invasive surgery at this time, and we believe this is an appropriate approach.    Risks of this surgery include risk of infection, risk of dural tear resulting in CSF leak which might result in headaches, or possible need for lumbar drain, or possible revision surgery in the setting of a persistent leak. Risk of hematoma or seroma resulting in wound complications.  Possible nerve root injury resulting in numbness weakness or paralysis into the arms or legs. Possible radiculitis which could result in similar symptoms or could result in significant neurogenic type pain.  There is also a risk of delayed onset nerve root pals or radiculitis, which I explained is potentially due to stretch injury or possibly due to delayed onset swelling, and is sometimes temporary but can also be permanent.  Risk of incomplete decompression which might require revision surgery in the future.  Risk of adjacent segment problems requiring surgery in the future. Risk of incomplete relief of symptoms possibly requiring revision surgery in the future. Furthermore, although rare, there are risks of major vessel injury such as to the major vessels anteriorly or to the bowel from the surgery.  Sometimes this can happen if an instrument is passed anteriorly through the disc space. There is a risk of nonunion which might require revision surgery in the future, and risk of hardware complications from the instrumentation.  I do use imaging to help guide the placement of the instrumentation, but even with this there is a chance of implant malposition or problem.  There is a risk of blood clots in the legs or the lungs.  Lastly, although rare, there are certainly risks of the anesthetic including stroke heart attack and death.      We also discussed expected recovery time for patient patient asked multiple intelligent questions which were answered to her understanding.  Patient would like to proceed with surgical intervention at this time.  We will get the preoperative planning steps started today.    PAC referral  Vascular referral for discussion with Dr. Moreno, co-surgeon  CT lumbar spine without contrast  EOS full spine standing AP/lateral view images  Continue with Prolia treatment for osteoporosis  Surgery: OLIF/PSF L5-S1    Patient seen and plan discussed with Dr. Ramirez.   Alexa Merino (cristóbal Higgins)SAIDA    Respectfully,  Serge Ramirez MD  Spine Surgery  [facility]    Attending MD (Dr. Serge Ramirez) :  I reviewed and verified the history and physical exam of the patient and  discussed the patient's management with the other clinical providers involved in this patient's care including any involved residents or physicians assistants. I reviewed the above note and agree with the documented findings and plan of care, which were communicated to the patient.      Serge Ramirez MD        Teaching Flowsheet   Relevant Diagnosis: Lumbar  Teaching Topic: preop     Person(s) involved in teaching:   Patient     Motivation Level:  Asks Questions: Yes  Eager to Learn: Yes  Cooperative: Yes  Receptive (willing/able to accept information): Yes  Any cultural factors/Buddhist beliefs that may influence understanding or compliance? No       Patient demonstrates understanding of the following:  Reason for the appointment, diagnosis and treatment plan: Yes  Knowledge of proper use of medications and conditions for which they are ordered (with special attention to potential side effects or drug interactions): Yes  Which situations necessitate calling provider and whom to contact: Yes       Teaching Concerns Addressed:        Proper use and care of meds. (medical equip, care aids, etc.): Yes  Nutritional needs and diet plan: Yes  Pain management techniques: Yes  Wound Care: Yes  How and/when to access community resources: Yes     Instructional Materials Used/Given: preop pkt     Time spent with patient: 15 minutes.    Answers for HPI/ROS submitted by the patient on 10/11/2021  General Symptoms: Yes  Skin Symptoms: No  HENT Symptoms: Yes  EYE SYMPTOMS: Yes  HEART SYMPTOMS: Yes  LUNG SYMPTOMS: No  INTESTINAL SYMPTOMS: No  URINARY SYMPTOMS: Yes  GYNECOLOGIC SYMPTOMS: Yes  BREAST SYMPTOMS: No  SKELETAL SYMPTOMS: Yes  BLOOD SYMPTOMS: No  NERVOUS SYSTEM SYMPTOMS: Yes  MENTAL HEALTH SYMPTOMS: No  Ear pain: No  Ear discharge: No  Hearing loss: No  Tinnitus: Yes  Nosebleeds: No  Congestion: No  Sinus pain: No  Trouble swallowing: Yes   Voice hoarseness: No  Mouth sores: Yes  Sore throat: No  Tooth pain:  No  Gum tenderness: Yes  Bleeding gums: Yes  Change in taste: No  Change in sense of smell: No  Dry mouth: Yes  Hearing aid used: Yes  Neck lump: No  Fever: No  Loss of appetite: No  Weight loss: Yes  Weight gain: No  Fatigue: Yes  Night sweats: No  Chills: No  Increased stress: No  Excessive hunger: No  Excessive thirst: Yes  Feeling hot or cold when others believe the temperature is normal: No  Loss of height: No  Post-operative complications: No  Surgical site pain: No  Hallucinations: No  Change in or Loss of Energy: No  Hyperactivity: No  Confusion: No  Dry eyes: Yes  Floaters: Yes  Chest pain or pressure: No  Fast or irregular heartbeat: Yes  Pain in legs with walking: Yes  Trouble breathing while lying down: No  Fingers or toes appear blue: No  High blood pressure: Yes  Low blood pressure: No  Fainting: No  Murmurs: No  Pacemaker: No  Varicose veins: Yes  Edema or swelling: Yes  Wake up at night with shortness of breath: No  Light-headedness: Yes  Exercise intolerance: Yes  Trouble holding urine or incontinence: Yes  Pain or burning: No  Trouble starting or stopping: Yes  Increased frequency of urination: Yes  Blood in urine: No  Decreased frequency of urination: No  Frequent nighttime urination: Yes  Flank pain: No  Difficulty emptying bladder: Yes  Bleeding or spotting between periods: No  Heavy or painful periods: No  Irregular periods: No  Vaginal discharge: No  Hot flashes: No  Vaginal dryness: Yes  Genital ulcers: No  Reduced libido: Yes  Painful intercourse: No  Difficulty with sexual arousal: Yes  Post-menopausal bleeding: No  Back pain: Yes  Muscle aches: Yes  Neck pain: Yes  Swollen joints: No  Joint pain: Yes  Bone pain: No  Muscle cramps: Yes  Muscle weakness: Yes  Joint stiffness: Yes  Bone fracture: No  Trouble with coordination: Yes  Dizziness or trouble with balance: Yes  Fainting or black-out spells: No  Memory loss: No  Headache: No  Seizures: No  Speech problems: No  Tingling: Yes  Tremor:  No  Weakness: Yes  Difficulty walking: Yes  Paralysis: No  Numbness: Yes

## 2021-10-18 ENCOUNTER — MYC MEDICAL ADVICE (OUTPATIENT)
Dept: NEUROLOGY | Facility: CLINIC | Age: 71
End: 2021-10-18

## 2021-10-18 ENCOUNTER — TELEPHONE (OUTPATIENT)
Dept: ORTHOPEDICS | Facility: CLINIC | Age: 71
End: 2021-10-18

## 2021-10-18 NOTE — PROGRESS NOTES
Teaching Flowsheet   Relevant Diagnosis: Lumbar  Teaching Topic: preop     Person(s) involved in teaching:   Patient     Motivation Level:  Asks Questions: Yes  Eager to Learn: Yes  Cooperative: Yes  Receptive (willing/able to accept information): Yes  Any cultural factors/Rastafari beliefs that may influence understanding or compliance? No       Patient demonstrates understanding of the following:  Reason for the appointment, diagnosis and treatment plan: Yes  Knowledge of proper use of medications and conditions for which they are ordered (with special attention to potential side effects or drug interactions): Yes  Which situations necessitate calling provider and whom to contact: Yes       Teaching Concerns Addressed:        Proper use and care of meds. (medical equip, care aids, etc.): Yes  Nutritional needs and diet plan: Yes  Pain management techniques: Yes  Wound Care: Yes  How and/when to access community resources: Yes     Instructional Materials Used/Given: preop pkt     Time spent with patient: 15 minutes.    Answers for HPI/ROS submitted by the patient on 10/11/2021  General Symptoms: Yes  Skin Symptoms: No  HENT Symptoms: Yes  EYE SYMPTOMS: Yes  HEART SYMPTOMS: Yes  LUNG SYMPTOMS: No  INTESTINAL SYMPTOMS: No  URINARY SYMPTOMS: Yes  GYNECOLOGIC SYMPTOMS: Yes  BREAST SYMPTOMS: No  SKELETAL SYMPTOMS: Yes  BLOOD SYMPTOMS: No  NERVOUS SYSTEM SYMPTOMS: Yes  MENTAL HEALTH SYMPTOMS: No  Ear pain: No  Ear discharge: No  Hearing loss: No  Tinnitus: Yes  Nosebleeds: No  Congestion: No  Sinus pain: No  Trouble swallowing: Yes   Voice hoarseness: No  Mouth sores: Yes  Sore throat: No  Tooth pain: No  Gum tenderness: Yes  Bleeding gums: Yes  Change in taste: No  Change in sense of smell: No  Dry mouth: Yes  Hearing aid used: Yes  Neck lump: No  Fever: No  Loss of appetite: No  Weight loss: Yes  Weight gain: No  Fatigue: Yes  Night sweats: No  Chills: No  Increased stress: No  Excessive hunger: No  Excessive thirst:  Yes  Feeling hot or cold when others believe the temperature is normal: No  Loss of height: No  Post-operative complications: No  Surgical site pain: No  Hallucinations: No  Change in or Loss of Energy: No  Hyperactivity: No  Confusion: No  Dry eyes: Yes  Floaters: Yes  Chest pain or pressure: No  Fast or irregular heartbeat: Yes  Pain in legs with walking: Yes  Trouble breathing while lying down: No  Fingers or toes appear blue: No  High blood pressure: Yes  Low blood pressure: No  Fainting: No  Murmurs: No  Pacemaker: No  Varicose veins: Yes  Edema or swelling: Yes  Wake up at night with shortness of breath: No  Light-headedness: Yes  Exercise intolerance: Yes  Trouble holding urine or incontinence: Yes  Pain or burning: No  Trouble starting or stopping: Yes  Increased frequency of urination: Yes  Blood in urine: No  Decreased frequency of urination: No  Frequent nighttime urination: Yes  Flank pain: No  Difficulty emptying bladder: Yes  Bleeding or spotting between periods: No  Heavy or painful periods: No  Irregular periods: No  Vaginal discharge: No  Hot flashes: No  Vaginal dryness: Yes  Genital ulcers: No  Reduced libido: Yes  Painful intercourse: No  Difficulty with sexual arousal: Yes  Post-menopausal bleeding: No  Back pain: Yes  Muscle aches: Yes  Neck pain: Yes  Swollen joints: No  Joint pain: Yes  Bone pain: No  Muscle cramps: Yes  Muscle weakness: Yes  Joint stiffness: Yes  Bone fracture: No  Trouble with coordination: Yes  Dizziness or trouble with balance: Yes  Fainting or black-out spells: No  Memory loss: No  Headache: No  Seizures: No  Speech problems: No  Tingling: Yes  Tremor: No  Weakness: Yes  Difficulty walking: Yes  Paralysis: No  Numbness: Yes

## 2021-10-18 NOTE — TELEPHONE ENCOUNTER
Patient is scheduled for surgery with Dr. Ramirez & Dr Moreno    Spoke with: Patient    Date of Surgery: 11/18/21    Location: [place]    Post op: 6 weeks    Pre op with Provider: Complete    H&P: Scheduled with PAC 10/26/21    Pre-procedure COVID-19 Test: Will wait for call to schedule    Additional imaging/appointments: N/A    Surgery packet: Received in clinic     Additional comments: N/A

## 2021-10-18 NOTE — TELEPHONE ENCOUNTER
RN called as a follow up as patient was seen last Friday.   Patient has no questions regarding pre surgery teaching as she received the packet and teaching in clinic.  Patient has obtained all imaging.    Tim Flores RN

## 2021-10-18 NOTE — TELEPHONE ENCOUNTER
FUTURE VISIT INFORMATION      SURGERY INFORMATION:    Date: 21    Location: ur or    Surgeon:  Serge Ramirez MD Reed, Amy B, MD    Anesthesia Type:  general    Procedure: Part 1: Oblique Anterior Interbody Fusion at Lumbar 5 to sacral 1 with use of Bone Morphogenic Protein, Part 2 Same Day Prone Positioning: Open Posterior Instrumented Spinal Fusion at Lumbar 5 to Sacral 1, with grimm aguayo osteotomy, use of O-Arm/Stealth    Consult: ov 10/15    RECORDS REQUESTED FROM:        Primary Care Provider: Matilde Quiñonez APRN CNP  - [facility]    Pertinent Medical History: murmurs, hypertension    Most recent EKG+ Tracin13    Most recent ECHO: 19

## 2021-10-19 DIAGNOSIS — I10 BENIGN ESSENTIAL HYPERTENSION: ICD-10-CM

## 2021-10-19 DIAGNOSIS — Z17.0 MALIGNANT NEOPLASM OF LEFT BREAST IN FEMALE, ESTROGEN RECEPTOR POSITIVE, UNSPECIFIED SITE OF BREAST (H): ICD-10-CM

## 2021-10-19 DIAGNOSIS — C50.912 MALIGNANT NEOPLASM OF LEFT BREAST IN FEMALE, ESTROGEN RECEPTOR POSITIVE, UNSPECIFIED SITE OF BREAST (H): ICD-10-CM

## 2021-10-19 DIAGNOSIS — G62.9 PERIPHERAL POLYNEUROPATHY: Primary | ICD-10-CM

## 2021-10-19 RX ORDER — ANASTROZOLE 1 MG/1
TABLET ORAL
Qty: 90 TABLET | Refills: 3 | Status: SHIPPED | OUTPATIENT
Start: 2021-10-19 | End: 2021-11-10

## 2021-10-19 NOTE — TELEPHONE ENCOUNTER
"Last prescribing provider: Dr. Khushbu Louise    Last clinic visit date: 5/10/21    Any missed appointments or no-shows since last clinic visit?: No    Recommendations for requested medication (if none, N/A): Copied from chart note by Khushbu Louise on 5/10/21:  \"Inflammatory breast cancer, stage IIIC, the left breast, ER positive, HER2 negative, status post neoadjuvant chemotherapy followed by bilateral mastectomies. Chest wall radiotherapy completed in 03/2008. She was on adjuvant Arimidex from November 2007 until November 2012, and then was switched to tamoxifen. She completed 10 years of adjuvant hormonal therapy in November 2017. At that time, she was switched back to arimidex per MA 17 trial. She is tolerating this well.    We discussed the risks and benefits of staying on anastrozole and will plan to re-evaluate this fall. Will continue for now.\"    Next clinic visit date: 11/8/21    Any other pertinent information (if none, N/A): N/A  "

## 2021-10-20 ENCOUNTER — HOSPITAL ENCOUNTER (OUTPATIENT)
Dept: PHYSICAL THERAPY | Facility: CLINIC | Age: 71
Setting detail: THERAPIES SERIES
End: 2021-10-20
Attending: PSYCHIATRY & NEUROLOGY
Payer: COMMERCIAL

## 2021-10-20 PROCEDURE — 97110 THERAPEUTIC EXERCISES: CPT | Mod: GP | Performed by: PHYSICAL THERAPIST

## 2021-10-21 ENCOUNTER — OFFICE VISIT (OUTPATIENT)
Dept: NEUROLOGY | Facility: CLINIC | Age: 71
End: 2021-10-21
Payer: COMMERCIAL

## 2021-10-21 ENCOUNTER — MYC MEDICAL ADVICE (OUTPATIENT)
Dept: ORTHOPEDICS | Facility: CLINIC | Age: 71
End: 2021-10-21

## 2021-10-21 DIAGNOSIS — G62.9 PERIPHERAL POLYNEUROPATHY: ICD-10-CM

## 2021-10-21 DIAGNOSIS — G56.01 RIGHT CARPAL TUNNEL SYNDROME: Primary | ICD-10-CM

## 2021-10-21 DIAGNOSIS — G56.03 BILATERAL CARPAL TUNNEL SYNDROME: ICD-10-CM

## 2021-10-21 PROCEDURE — 95886 MUSC TEST DONE W/N TEST COMP: CPT | Performed by: PSYCHIATRY & NEUROLOGY

## 2021-10-21 PROCEDURE — 95913 NRV CNDJ TEST 13/> STUDIES: CPT | Performed by: PSYCHIATRY & NEUROLOGY

## 2021-10-21 PROCEDURE — 95885 MUSC TST DONE W/NERV TST LIM: CPT | Mod: 59 | Performed by: PSYCHIATRY & NEUROLOGY

## 2021-10-21 NOTE — PROGRESS NOTES
[facility]  Electrodiagnostic Laboratory                 Department of Neurology                                                                                                         Test Date:  10/21/2021    Patient: Nadira Perez : 1950 Physician: Live Arias MD   Sex: Female AGE: 71 year Ref Phys:    ID#: [id number]   Technician: CESIA Nguyễn     Clinical Information: Ms. Nadira Perez is a 71-year-old female.  She comes for evaluation of possible peripheral neuropathy or radiculopathy in the legs.  She is also sent for evaluation for possible carpal tunnel or right cervical radiculopathy.  Her referring physicians are Dr. Antoine and Dr. Conley      Techniques:  Motor and sensory conduction studies were done with surface recording electrodes. EMG was done with a concentric needle electrode.     Results:  The right fibular compound motoractionpotential to the EDB is absent.  The left fibular CMAP is normal.  The right fibular CMAP to the anterior tibialis is of low amplitude but the conduction velocity is preserved.  Tibial CMAP's are reduced in amplitude bilaterally.  The right tibial motor conduction velocity is slowed but this is consistent with the loss of CMAP amplitude.  The right median CMAP is markedly reduced in amplitude and its motor distal latency prolonged.  The left median motor conduction study is normal.  Ulnar motor conduction studies were essentially normal bilaterally except for a borderline right ulnar motor conduction velocity.  Distal median and ulnar motor conduction was compared bilaterally by stimulating the median nerve and recording from the lumbricals and stimulating the ulnar nerve and recording from the second palmar interossei at equal distances.  Distal median motor conduction was disproportionately slowed on the right.  The left median SNAP was of normal amplitude with a mildly prolonged distal latency.  The right median SNAP  was absent.  The ulnar SNAP's were normal bilaterally and the right superficial radial SNAP was normal.  Sural SNAP's in the right superficial fibular SNAP were absent.  Needle exam revealed fibrillation and distal L5-S1 innervated muscles bilaterally.  Motor unit potential durations were increased in proximal L5-S1 muscles.  Fibrillation was seen in the right L5 paraspinal muscle.    Interpretation: The EMG is abnormal and the findings complex.  The findings would be best explained by 1) bilateral active L5-S1 radiculopathies, 2) a generalized, sensorimotor peripheral neuropathy and 3) a severe right median neuropathy at the wrist (carpal tunnel syndrome).              ___________________________  Live Arias MD        Nerve Conduction Studies  Motor Sites      Latency Amplitude Neg. Amp Diff Segment Distance Velocity Neg. Dur Neg Area Diff Temperature Comment   Site (ms) Norm (mV) Norm %  cm m/s Norm ms %  C    Right Dp Branch Fibular (TA) Motor   Fib Head 4.2  < 4.2 0.21 -      7.6  32.1    Pop Fossa *6.1  < 5.7 0.28 - 33.3 Pop Fossa-Fib Head 10 53 - - - 32.1    Left Fibular (EDB) Motor   Ankle 5.9  < 6.0 2.3  > 2.0  Ankle-EDB 8   5.2  32.1    Right Fibular (EDB) Motor   Ankle *NR  < 6.0 *NR  > 2.0  Ankle-EDB 8   *NR  24.1    Left Median (APB) Motor   Wrist 3.5  < 4.2 7.4  > 5.0  Wrist-APB 8   5.6  33.1    Elbow 8.2 - 6.2 - -16.2 Elbow-Wrist 23 49  > 48 6.7 -2.5 33.1    Right Median (APB) Motor   Wrist *8.6  < 4.2 *1.31  > 5.0  Wrist-APB 8   9.3  33.7    Elbow 13.0 - 0.43 - -67.2 Elbow-Wrist 24 55  > 48 16.1 -53.4 33.6    Left Median/Ulnar (Lumb-Dors Int II) Motor        Median (Lumb I)   Wrist 3.3 - 0.46 -      11.0  33.1         Ulnar (Dorsal Int (manus))   Wrist 3.1 - 3.5 -      4.3  33.1    Right Median/Ulnar (Lumb-Dors Int II) Motor        Median (Lumb I)   Wrist 13.9 - 0.19 -      6.3  33         Ulnar (Dorsal Int (manus))   Wrist 3.3 - 4.6 -      4.1  33    Left Tibial (AHB) Motor   Ankle 5.1  < 6.5  *1.69  > 4.4  Ankle-AHB 8   7.2  32.1    Right Tibial (AHB) Motor   Ankle 4.2  < 6.5 *1.24  > 4.4  Ankle-AHB 8   7.6  32.1    Knee 15.9 - 0.82 - -33.9 Knee-Ankle 42 *36  > 38 10.3 -31.6 32.1    Left Ulnar (ADM) Motor   Wrist 2.9  < 3.5 5.4  > 3.0  Wrist-ADM 8   6.8  33.1    Bel Elbow 7.5 - 2.7 - -50.0 Bel Elbow-Wrist 22 *48  > 48 7.7 -35.7 33.1    Abv Elbow 9.7 - 2.9 - 7.4 Abv Elbow-Bel Elbow 11 50  > 48 7.9 8.2 33.1    Right Ulnar (ADM) Motor   Wrist 2.9  < 3.5 7.1  > 3.0  Wrist-ADM 8   5.2  33    Bel Elbow 6.3 - 6.2 - -12.7 Bel Elbow-Wrist 20 59  > 48 5.8 -4.9 33.1    Abv Elbow 7.9 - 5.9 - -4.8 Abv Elbow-Bel Elbow 10 63  > 48 5.8 -0.57 33.1      Sensory Sites      Onset Lat Neg Peak Lat Amp (O-P) Amp (P-P) Segment Distance Velocity Temperature Comment   Site ms ms  V Norm  V  cm m/s Norm  C    Left Median Sensory   Wrist-Dig II 2.7 *3.5 11  > 10 15 Wrist-Dig II 14 52  > 48 33.1    Right Median Sensory   Wrist-Dig II *NR *NR *NR  > 10 *NR Wrist-Dig II 14 *NR  > 48 23.7    Right Radial Sensory   Forearm-Wrist 2.2 2.8 18  > 15 22 Forearm-Wrist 10 45 - 33.1    Right Superficial Fibular Sensory   14 cm-Ankle *NR *NR *NR  > 3 *NR 14 cm-Ankle 12.5 *NR  > 38 32.1    Left Sural Sensory   Calf-Lat Mall *NR *NR *NR  > 5 *NR Calf-Lat Mall 14 *NR  > 38 32.1    Right Sural Sensory   Calf-Lat Mall *NR *NR *NR  > 5 *NR Calf-Lat Mall 14 *NR  > 38 32.1    Left Ulnar Sensory   Wrist-Dig V 2.5 *3.4 9  > 8 *11 Wrist-Dig V 12.5 50  > 48 33.1    Right Ulnar Sensory   Wrist-Dig V 2.3 3.1 9  > 8 18 Wrist-Dig V 12.5 54  > 48 33.1        Electromyography     Side Muscle Ins Act Fibs/PSW Fasc HF Amp Dur Poly Recrt Int Pat Comment   Right FDI Nml None Nml 0 Nml Nml 0 Nml Nml    Right EIP Nml None Nml 0 Nml Nml 0 Nml Nml    Right FCR Nml None Nml 0 Nml Nml 0 Nml Nml    Right Biceps Nml None Nml 0 Nml Nml 0 Nml Nml    Right Triceps Nml None Nml 0 *1+ *1+ 0 Nml Nml    Right Tib Anterior *Incr *1+ Nml 0 *2+ *2+ 0 *sevred Nml    Right Gastroc MH  *Incr *1+ Nml 0 *1+ *1+ 0 Nml Nml    Left Tib Anterior *Incr *1+ Nml 0 *1+ *1+ 0 *mildlyred Nml    Left Gastroc MH *Incr *1+ Nml 0 *1+ *1+ 0 Nml Nml    Right Vastus Lat Nml None Nml 0 Nml Nml 0 Nml Nml    Right TensFascLat Nml None Nml 0 *1+ *1+ 0 Nml Nml    Right Gluteus Max Nml None Nml 0 *1+ *1+ 0 Nml Nml    Left Gluteus Max Nml None Nml 0 Nml Nml 0 Nml Nml    Right L5 Parasp *Incr *2+ Nml 0               NCS Waveforms:    Motor                                    Sensory

## 2021-10-21 NOTE — LETTER
10/21/2021       RE: Nadira Perez  [id number]  [facility] [address]     Dear Colleague,    Thank you for referring your patient, Nadira Perez, to the [facility] EMG CLINIC [place] at [facility]. Please see a copy of my visit note below.         [facility]  Electrodiagnostic Laboratory                 Department of Neurology    Test Date:  10/21/2021    Patient: Nadira Perez : 1950 Physician: Live Arias MD   Sex: Female AGE: 71 year Ref Phys:    ID#: [id number]   Technician: CESIA Nguyễn     Clinical Information: Ms. Nadira Perez is a 71-year-old female.  She comes for evaluation of possible peripheral neuropathy or radiculopathy in the legs.  She is also sent for evaluation for possible carpal tunnel or right cervical radiculopathy.  Her referring physicians are Dr. Antoine and Dr. Conley      Techniques:  Motor and sensory conduction studies were done with surface recording electrodes. EMG was done with a concentric needle electrode.     Results:  The right fibular compound motoractionpotential to the EDB is absent.  The left fibular CMAP is normal.  The right fibular CMAP to the anterior tibialis is of low amplitude but the conduction velocity is preserved.  Tibial CMAP's are reduced in amplitude bilaterally.  The right tibial motor conduction velocity is slowed but this is consistent with the loss of CMAP amplitude.  The right median CMAP is markedly reduced in amplitude and its motor distal latency prolonged.  The left median motor conduction study is normal.  Ulnar motor conduction studies were essentially normal bilaterally except for a borderline right ulnar motor conduction velocity.  Distal median and ulnar motor conduction was compared bilaterally by stimulating the median nerve and recording from the lumbricals and stimulating the ulnar nerve and recording from the second palmar interossei at  equal distances.  Distal median motor conduction was disproportionately slowed on the right.  The left median SNAP was of normal amplitude with a mildly prolonged distal latency.  The right median SNAP was absent.  The ulnar SNAP's were normal bilaterally and the right superficial radial SNAP was normal.  Sural SNAP's in the right superficial fibular SNAP were absent.  Needle exam revealed fibrillation and distal L5-S1 innervated muscles bilaterally.  Motor unit potential durations were increased in proximal L5-S1 muscles.  Fibrillation was seen in the right L5 paraspinal muscle.    Interpretation: The EMG is abnormal and the findings complex.  The findings would be best explained by 1) bilateral active L5-S1 radiculopathies, 2) a generalized, sensorimotor peripheral neuropathy and 3) a severe right median neuropathy at the wrist (carpal tunnel syndrome).              ___________________________  Live Arias MD      Nerve Conduction Studies  Motor Sites      Latency Amplitude Neg. Amp Diff Segment Distance Velocity Neg. Dur Neg Area Diff Temperature Comment   Site (ms) Norm (mV) Norm %  cm m/s Norm ms %  C    Right Dp Branch Fibular (TA) Motor   Fib Head 4.2  < 4.2 0.21 -      7.6  32.1    Pop Fossa *6.1  < 5.7 0.28 - 33.3 Pop Fossa-Fib Head 10 53 - - - 32.1    Left Fibular (EDB) Motor   Ankle 5.9  < 6.0 2.3  > 2.0  Ankle-EDB 8   5.2  32.1    Right Fibular (EDB) Motor   Ankle *NR  < 6.0 *NR  > 2.0  Ankle-EDB 8   *NR  24.1    Left Median (APB) Motor   Wrist 3.5  < 4.2 7.4  > 5.0  Wrist-APB 8   5.6  33.1    Elbow 8.2 - 6.2 - -16.2 Elbow-Wrist 23 49  > 48 6.7 -2.5 33.1    Right Median (APB) Motor   Wrist *8.6  < 4.2 *1.31  > 5.0  Wrist-APB 8   9.3  33.7    Elbow 13.0 - 0.43 - -67.2 Elbow-Wrist 24 55  > 48 16.1 -53.4 33.6    Left Median/Ulnar (Lumb-Dors Int II) Motor        Median (Lumb I)   Wrist 3.3 - 0.46 -      11.0  33.1         Ulnar (Dorsal Int (manus))   Wrist 3.1 - 3.5 -      4.3  33.1    Right  Median/Ulnar (Lumb-Dors Int II) Motor        Median (Lumb I)   Wrist 13.9 - 0.19 -      6.3  33         Ulnar (Dorsal Int (manus))   Wrist 3.3 - 4.6 -      4.1  33    Left Tibial (AHB) Motor   Ankle 5.1  < 6.5 *1.69  > 4.4  Ankle-AHB 8   7.2  32.1    Right Tibial (AHB) Motor   Ankle 4.2  < 6.5 *1.24  > 4.4  Ankle-AHB 8   7.6  32.1    Knee 15.9 - 0.82 - -33.9 Knee-Ankle 42 *36  > 38 10.3 -31.6 32.1    Left Ulnar (ADM) Motor   Wrist 2.9  < 3.5 5.4  > 3.0  Wrist-ADM 8   6.8  33.1    Bel Elbow 7.5 - 2.7 - -50.0 Bel Elbow-Wrist 22 *48  > 48 7.7 -35.7 33.1    Abv Elbow 9.7 - 2.9 - 7.4 Abv Elbow-Bel Elbow 11 50  > 48 7.9 8.2 33.1    Right Ulnar (ADM) Motor   Wrist 2.9  < 3.5 7.1  > 3.0  Wrist-ADM 8   5.2  33    Bel Elbow 6.3 - 6.2 - -12.7 Bel Elbow-Wrist 20 59  > 48 5.8 -4.9 33.1    Abv Elbow 7.9 - 5.9 - -4.8 Abv Elbow-Bel Elbow 10 63  > 48 5.8 -0.57 33.1      Sensory Sites      Onset Lat Neg Peak Lat Amp (O-P) Amp (P-P) Segment Distance Velocity Temperature Comment   Site ms ms  V Norm  V  cm m/s Norm  C    Left Median Sensory   Wrist-Dig II 2.7 *3.5 11  > 10 15 Wrist-Dig II 14 52  > 48 33.1    Right Median Sensory   Wrist-Dig II *NR *NR *NR  > 10 *NR Wrist-Dig II 14 *NR  > 48 23.7    Right Radial Sensory   Forearm-Wrist 2.2 2.8 18  > 15 22 Forearm-Wrist 10 45 - 33.1    Right Superficial Fibular Sensory   14 cm-Ankle *NR *NR *NR  > 3 *NR 14 cm-Ankle 12.5 *NR  > 38 32.1    Left Sural Sensory   Calf-Lat Mall *NR *NR *NR  > 5 *NR Calf-Lat Mall 14 *NR  > 38 32.1    Right Sural Sensory   Calf-Lat Mall *NR *NR *NR  > 5 *NR Calf-Lat Mall 14 *NR  > 38 32.1    Left Ulnar Sensory   Wrist-Dig V 2.5 *3.4 9  > 8 *11 Wrist-Dig V 12.5 50  > 48 33.1    Right Ulnar Sensory   Wrist-Dig V 2.3 3.1 9  > 8 18 Wrist-Dig V 12.5 54  > 48 33.1        Electromyography     Side Muscle Ins Act Fibs/PSW Fasc HF Amp Dur Poly Recrt Int Pat Comment   Right FDI Nml None Nml 0 Nml Nml 0 Nml Nml    Right EIP Nml None Nml 0 Nml Nml 0 Nml Nml    Right FCR Nml  None Nml 0 Nml Nml 0 Nml Nml    Right Biceps Nml None Nml 0 Nml Nml 0 Nml Nml    Right Triceps Nml None Nml 0 *1+ *1+ 0 Nml Nml    Right Tib Anterior *Incr *1+ Nml 0 *2+ *2+ 0 *sevred Nml    Right Gastroc MH *Incr *1+ Nml 0 *1+ *1+ 0 Nml Nml    Left Tib Anterior *Incr *1+ Nml 0 *1+ *1+ 0 *mildlyred Nml    Left Gastroc MH *Incr *1+ Nml 0 *1+ *1+ 0 Nml Nml    Right Vastus Lat Nml None Nml 0 Nml Nml 0 Nml Nml    Right TensFascLat Nml None Nml 0 *1+ *1+ 0 Nml Nml    Right Gluteus Max Nml None Nml 0 *1+ *1+ 0 Nml Nml    Left Gluteus Max Nml None Nml 0 Nml Nml 0 Nml Nml    Right L5 Parasp *Incr *2+ Nml 0               NCS Waveforms:    Motor                                    Sensory                             Again, thank you for allowing me to participate in the care of your patient.      Sincerely,    Live Arias MD

## 2021-10-21 NOTE — TELEPHONE ENCOUNTER
LISINOPRIL 40 MG TABLET  Requested;   TAKE 1 TABLET BY MOUTH EVERY DAY  Current:  Route: Take 0.5 tablets (20 mg) by mouth 2 times daily -Class: No Print Out      Last Office Visit : 5-18-21  Future Office visit:  10-28-21    Abnormal lab,K-- reviewed by provider, lab tab  See 9-30-21 My C note    Routing refill request to provider for review/approval because:  Medication is reported/historical/no print out

## 2021-10-22 ENCOUNTER — OFFICE VISIT (OUTPATIENT)
Dept: ORTHOPEDICS | Facility: CLINIC | Age: 71
End: 2021-10-22
Payer: OTHER MISCELLANEOUS

## 2021-10-22 VITALS
WEIGHT: 167 LBS | DIASTOLIC BLOOD PRESSURE: 84 MMHG | BODY MASS INDEX: 26.21 KG/M2 | SYSTOLIC BLOOD PRESSURE: 134 MMHG | HEIGHT: 67 IN

## 2021-10-22 DIAGNOSIS — G56.01 RIGHT CARPAL TUNNEL SYNDROME: Primary | ICD-10-CM

## 2021-10-22 DIAGNOSIS — M19.031 PRIMARY OSTEOARTHRITIS OF BOTH WRISTS: ICD-10-CM

## 2021-10-22 DIAGNOSIS — M19.032 PRIMARY OSTEOARTHRITIS OF BOTH WRISTS: ICD-10-CM

## 2021-10-22 PROCEDURE — 99213 OFFICE O/P EST LOW 20 MIN: CPT | Mod: 25 | Performed by: STUDENT IN AN ORGANIZED HEALTH CARE EDUCATION/TRAINING PROGRAM

## 2021-10-22 PROCEDURE — 20526 THER INJECTION CARP TUNNEL: CPT | Mod: RT | Performed by: STUDENT IN AN ORGANIZED HEALTH CARE EDUCATION/TRAINING PROGRAM

## 2021-10-22 PROCEDURE — 20605 DRAIN/INJ JOINT/BURSA W/O US: CPT | Mod: 50 | Performed by: STUDENT IN AN ORGANIZED HEALTH CARE EDUCATION/TRAINING PROGRAM

## 2021-10-22 RX ORDER — DEXAMETHASONE SODIUM PHOSPHATE 4 MG/ML
4 INJECTION, SOLUTION INTRA-ARTICULAR; INTRALESIONAL; INTRAMUSCULAR; INTRAVENOUS; SOFT TISSUE
Status: DISCONTINUED | OUTPATIENT
Start: 2021-10-22 | End: 2021-11-03

## 2021-10-22 RX ORDER — TRIAMCINOLONE ACETONIDE 40 MG/ML
40 INJECTION, SUSPENSION INTRA-ARTICULAR; INTRAMUSCULAR
Status: DISCONTINUED | OUTPATIENT
Start: 2021-10-22 | End: 2021-11-03

## 2021-10-22 RX ORDER — LIDOCAINE HYDROCHLORIDE 10 MG/ML
2 INJECTION, SOLUTION EPIDURAL; INFILTRATION; INTRACAUDAL; PERINEURAL
Status: DISCONTINUED | OUTPATIENT
Start: 2021-10-22 | End: 2021-11-03

## 2021-10-22 RX ORDER — LIDOCAINE HYDROCHLORIDE 10 MG/ML
2 INJECTION, SOLUTION INFILTRATION; PERINEURAL
Status: DISCONTINUED | OUTPATIENT
Start: 2021-10-22 | End: 2021-11-03

## 2021-10-22 RX ORDER — LISINOPRIL 40 MG/1
TABLET ORAL
Qty: 90 TABLET | Refills: 0 | Status: SHIPPED | OUTPATIENT
Start: 2021-10-22 | End: 2021-10-28

## 2021-10-22 RX ADMIN — TRIAMCINOLONE ACETONIDE 40 MG: 40 INJECTION, SUSPENSION INTRA-ARTICULAR; INTRAMUSCULAR at 15:04

## 2021-10-22 RX ADMIN — LIDOCAINE HYDROCHLORIDE 2 ML: 10 INJECTION, SOLUTION INFILTRATION; PERINEURAL at 15:04

## 2021-10-22 RX ADMIN — DEXAMETHASONE SODIUM PHOSPHATE 4 MG: 4 INJECTION, SOLUTION INTRA-ARTICULAR; INTRALESIONAL; INTRAMUSCULAR; INTRAVENOUS; SOFT TISSUE at 15:04

## 2021-10-22 RX ADMIN — LIDOCAINE HYDROCHLORIDE 2 ML: 10 INJECTION, SOLUTION EPIDURAL; INFILTRATION; INTRACAUDAL; PERINEURAL at 15:04

## 2021-10-22 ASSESSMENT — MIFFLIN-ST. JEOR: SCORE: 1315.14

## 2021-10-22 NOTE — PROGRESS NOTES
Orthopaedic Surgery Hand and Upper Extremity Clinic Progress Note:  10/11/2021     Patient Name: Nadira Perez  MRN: [id number]    CHIEF COMPLAINT:  Bilateral hand numbness and tingling, R > L    HPI:  Ms. Nadira Perez is a 71 year old old female right hand dominant who presents today for injections. She was seen last week with numbness in the thumb, index, and middle fingers of both hands for at least 3 months.  In the last few weeks she has had increased pain in the same distribution, worse on the right.  She has difficulty gripping and feeling with her hands.  She denies explicit nocturnal symptoms that wake her up.  She does wear wrist braces given her history of bilateral wrist surgeries.  She does have neck problems and scoliosis and is consulting soon with one of our spine surgeons regarding the need for surgery.    She underwent an EMG/NCS which demonstrated severe right carpal tunnel syndrome. I had called her yesterday to discuss results. She would like to defer surgery at this time and prefers to have temporizing injections so that she can first have her spine surgery scheduled for 11/18.    She has a history of bilateral scaphoidectomy's and 4 corner fusions performed by Dr. Schuyler Mendez. She does still have wrist pain with ROM but this is at baseline.  She has previously seen Dr. Prajapati for this as well as wrist swelling and received steroid injections in bilateral radiocarpal joints, last 10/2020. She endorses increased soft tissue swelling over her dorsal right wrist, nontender but states it has seemed to get larger over last few months.       The patient's past medical, family, and social history was reviewed and confirmed.    REVIEW OF SYMPTOMS:      General: Negative   Eyes: Negative   Ear, Nose and Throat: Negative   Respiratory: Negative   Cardiovascular: Negative   Gastrointestinal: Negative   Genito-urinary: Negative   Musculoskeletal: Negative  Neurological: Negative  "  Psychological: Negative  HEME: Negative   ENDO: Negative   SKIN: Negative    VITALS:  Vitals:    10/22/21 1410   BP: 134/84   Weight: 75.8 kg (167 lb)   Height: 1.702 m (5' 7\")       EXAM:  General: NAD, A&Ox3  HEENT: NC/AT  CV: RRR by peripheral pulse  Pulmonary: Non-labored breathing on RA  RUE:  Well-healed dorsal wrist incision  Soft, compressible soft tissue swelling dorsal wrist just distal to incision, nontender, seems to envelop but does not move with extensor tendons.  Wrist extension 35, wrist flexion 90, baseline  Marked APB atrophy   Intact EPL/FPL/APB/HI  Diminished sensation median nerve distribution compared to ulnar and compared to contralateral. Normal sensation radial  2 point discrimination > 10 mm median, <5mm ulnar and radial  Negative Tinel's  +Durkan's compression test at carpal tunnel   Negative Spurling's    LUE:  Well-healed dorsal wrist incision  Wrist extension 45, wrist flexion 80, baseline  Marked APB atrophy  Intact EPL/FPL/APB/HI  Mildly diminished sensation median nerve distribution compared to ulnar. Normal sensation radial  2 point discrimination 5 mm median, < 5mm ulnar and radial  Negative Tinel's  Negative Durkan's compression test at carpal tunnel   Negative Spurling's    IMAGING:    XRs of bilateral hands from last week again compared to prior XRs from 10/2020. Both fusions appear well-healed.    There has been interval migration of one of the screws that appears to be loose within the dorsal joint, which also seemed to be the case on last films. Dorsal soft tissue shadow is seen that appears to be confluent with the joint.     No significant change of left hand XRs.      I have personally reviewed the above images and labs.         IMPRESSION AND RECOMMENDATIONS:  Ms. Nadira Perez is a 71 year old year old female right hand dominant s/p bilateral scaphoidectomies and four corner fusions with right carpal tunnel syndrome.    Patient desires right carpal tunnel " injection. She also requests bilateral radiocarpal injections which she had been getting every 6 months. She is due for one on 11/11.     After obtaining written consent, I sterilely cleansed that the skin over the carpal tunnel with chlorhexidine.  The skin was anesthetized with ethyl chloride freeze spray.  I then injected 1 cc of dexamethasone 4 mg/mL and 2 cc of 1% lidocaine into the carpal tunnel from a location just ulnar to the palmaris longus tendon.  The patient tolerated the procedure well and there were no complications    I then identified the site of the radiocarpal injection the dorsal 3-4 portal.  I cleansed the skin over the dorsum of the wrist with chlorhexidine.  The skin was anesthetized with ethyl chloride freeze spray.  I then injected 40 mg of Kenalog and 2 cc of 1% lidocaine into the radiocarpal joint.  This was then repeated in the same fashion into the left radiocarpal joint.  The patient tolerated the procedure well and there are no complications.    Rolan Conley MD    Hand, Upper Extremity & Microvascular Surgery  Department of Orthopaedic Surgery  [facility]    Medium Joint Injection/Arthrocentesis: bilateral intercarpal    Date/Time: 10/22/2021 3:04 PM  Performed by: Rolan Conley MD  Authorized by: Rolan Conley MD     Indications:  Pain  Needle Size:  25 G  Guidance: surface landmarks    Approach:  Anterior  Location:  Wrist  Laterality:  Bilateral  Site:  Bilateral intercarpal  Medications (Right):  40 mg triamcinolone 40 MG/ML; 2 mL lidocaine 1 %  Medications (Left):  40 mg triamcinolone 40 MG/ML; 2 mL lidocaine 1 %  Outcome:  Tolerated well, no immediate complications  Procedure discussed: discussed risks, benefits, and alternatives    Consent Given by:  Patient  Prep: patient was prepped and draped in usual sterile fashion      Hand / Upper Extremity Injection/Arthrocentesis: R carpal tunnel    Date/Time: 10/22/2021 3:04 PM  Performed by: Rolan Conley  MD  Authorized by: Rolan Conley MD     Indications:  Pain  Needle Size:  25 G  Guidance: landmark    Approach:  Volar  Condition: carpal tunnel      Site:  R carpal tunnel  Medications:  4 mg dexamethasone 4 MG/ML; 2 mL lidocaine (PF) 1 %  Procedure discussed: discussed risks, benefits, and alternatives    Consent Given by:  Patient  Prep: patient was prepped and draped in usual sterile fashion

## 2021-10-22 NOTE — LETTER
10/22/2021         RE: Nadira Perez  [address]  [facility] [address]        Dear Colleague,    Thank you for referring your patient, Nadira Perez, to the [facility] [place]. Please see a copy of my visit note below.    Orthopaedic Surgery Hand and Upper Extremity Clinic Progress Note:  10/11/2021     Patient Name: Nadira Perez  MRN: [id number]    CHIEF COMPLAINT:  Bilateral hand numbness and tingling, R > L    HPI:  Ms. Nadira Perez is a 71 year old old female right hand dominant who presents today for injections. She was seen last week with numbness in the thumb, index, and middle fingers of both hands for at least 3 months.  In the last few weeks she has had increased pain in the same distribution, worse on the right.  She has difficulty gripping and feeling with her hands.  She denies explicit nocturnal symptoms that wake her up.  She does wear wrist braces given her history of bilateral wrist surgeries.  She does have neck problems and scoliosis and is consulting soon with one of our spine surgeons regarding the need for surgery.    She underwent an EMG/NCS which demonstrated severe right carpal tunnel syndrome. I had called her yesterday to discuss results. She would like to defer surgery at this time and prefers to have temporizing injections so that she can first have her spine surgery scheduled for 11/18.    She has a history of bilateral scaphoidectomy's and 4 corner fusions performed by Dr. Schuyler Mendez. She does still have wrist pain with ROM but this is at baseline.  She has previously seen Dr. Prajapati for this as well as wrist swelling and received steroid injections in bilateral radiocarpal joints, last 10/2020. She endorses increased soft tissue swelling over her dorsal right wrist, nontender but states it has seemed to get larger over last few months.       The patient's past medical, family, and social history was reviewed and  "confirmed.    REVIEW OF SYMPTOMS:      General: Negative   Eyes: Negative   Ear, Nose and Throat: Negative   Respiratory: Negative   Cardiovascular: Negative   Gastrointestinal: Negative   Genito-urinary: Negative   Musculoskeletal: Negative  Neurological: Negative   Psychological: Negative  HEME: Negative   ENDO: Negative   SKIN: Negative    VITALS:  Vitals:    10/22/21 1410   BP: 134/84   Weight: 75.8 kg (167 lb)   Height: 1.702 m (5' 7\")       EXAM:  General: NAD, A&Ox3  HEENT: NC/AT  CV: RRR by peripheral pulse  Pulmonary: Non-labored breathing on RA  RUE:  Well-healed dorsal wrist incision  Soft, compressible soft tissue swelling dorsal wrist just distal to incision, nontender, seems to envelop but does not move with extensor tendons.  Wrist extension 35, wrist flexion 90, baseline  Marked APB atrophy   Intact EPL/FPL/APB/HI  Diminished sensation median nerve distribution compared to ulnar and compared to contralateral. Normal sensation radial  2 point discrimination > 10 mm median, <5mm ulnar and radial  Negative Tinel's  +Durkan's compression test at carpal tunnel   Negative Spurling's    LUE:  Well-healed dorsal wrist incision  Wrist extension 45, wrist flexion 80, baseline  Marked APB atrophy  Intact EPL/FPL/APB/HI  Mildly diminished sensation median nerve distribution compared to ulnar. Normal sensation radial  2 point discrimination 5 mm median, < 5mm ulnar and radial  Negative Tinel's  Negative Durkan's compression test at carpal tunnel   Negative Spurling's    IMAGING:    XRs of bilateral hands from last week again compared to prior XRs from 10/2020. Both fusions appear well-healed.    There has been interval migration of one of the screws that appears to be loose within the dorsal joint, which also seemed to be the case on last films. Dorsal soft tissue shadow is seen that appears to be confluent with the joint.     No significant change of left hand XRs.      I have personally reviewed the above " images and labs.         IMPRESSION AND RECOMMENDATIONS:  Ms. Nadira Perez is a 71 year old year old female right hand dominant s/p bilateral scaphoidectomies and four corner fusions with right carpal tunnel syndrome.    Patient desires right carpal tunnel injection. She also requests bilateral radiocarpal injections which she had been getting every 6 months. She is due for one on 11/11.     After obtaining written consent, I sterilely cleansed that the skin over the carpal tunnel with chlorhexidine.  The skin was anesthetized with ethyl chloride freeze spray.  I then injected 1 cc of dexamethasone 4 mg/mL and 2 cc of 1% lidocaine into the carpal tunnel from a location just ulnar to the palmaris longus tendon.  The patient tolerated the procedure well and there were no complications    I then identified the site of the radiocarpal injection the dorsal 3-4 portal.  I cleansed the skin over the dorsum of the wrist with chlorhexidine.  The skin was anesthetized with ethyl chloride freeze spray.  I then injected 40 mg of Kenalog and 2 cc of 1% lidocaine into the radiocarpal joint.  This was then repeated in the same fashion into the left radiocarpal joint.  The patient tolerated the procedure well and there are no complications.    Rolan Conley MD    Hand, Upper Extremity & Microvascular Surgery  Department of Orthopaedic Surgery  [facility]    Medium Joint Injection/Arthrocentesis: bilateral intercarpal    Date/Time: 10/22/2021 3:04 PM  Performed by: Rolan Conley MD  Authorized by: Rolan Conley MD     Indications:  Pain  Needle Size:  25 G  Guidance: surface landmarks    Approach:  Anterior  Location:  Wrist  Laterality:  Bilateral  Site:  Bilateral intercarpal  Medications (Right):  40 mg triamcinolone 40 MG/ML; 2 mL lidocaine 1 %  Medications (Left):  40 mg triamcinolone 40 MG/ML; 2 mL lidocaine 1 %  Outcome:  Tolerated well, no immediate complications  Procedure discussed:  discussed risks, benefits, and alternatives    Consent Given by:  Patient  Prep: patient was prepped and draped in usual sterile fashion      Hand / Upper Extremity Injection/Arthrocentesis: R carpal tunnel    Date/Time: 10/22/2021 3:04 PM  Performed by: Rolan Conley MD  Authorized by: Rolan Conley MD     Indications:  Pain  Needle Size:  25 G  Guidance: landmark    Approach:  Volar  Condition: carpal tunnel      Site:  R carpal tunnel  Medications:  4 mg dexamethasone 4 MG/ML; 2 mL lidocaine (PF) 1 %  Procedure discussed: discussed risks, benefits, and alternatives    Consent Given by:  Patient  Prep: patient was prepped and draped in usual sterile fashion              Again, thank you for allowing me to participate in the care of your patient.        Sincerely,        Rolan Conley MD

## 2021-10-23 ENCOUNTER — OFFICE VISIT (OUTPATIENT)
Dept: ORTHOPEDICS | Facility: CLINIC | Age: 71
End: 2021-10-23
Payer: COMMERCIAL

## 2021-10-23 ENCOUNTER — ANCILLARY PROCEDURE (OUTPATIENT)
Dept: GENERAL RADIOLOGY | Facility: CLINIC | Age: 71
End: 2021-10-23
Payer: COMMERCIAL

## 2021-10-23 VITALS — WEIGHT: 167 LBS | HEIGHT: 67 IN | BODY MASS INDEX: 26.21 KG/M2

## 2021-10-23 DIAGNOSIS — M79.674 PAIN IN RIGHT TOE(S): ICD-10-CM

## 2021-10-23 DIAGNOSIS — M79.674 PAIN IN RIGHT TOE(S): Primary | ICD-10-CM

## 2021-10-23 PROCEDURE — 73660 X-RAY EXAM OF TOE(S): CPT | Mod: RT | Performed by: RADIOLOGY

## 2021-10-23 PROCEDURE — 99213 OFFICE O/P EST LOW 20 MIN: CPT | Performed by: STUDENT IN AN ORGANIZED HEALTH CARE EDUCATION/TRAINING PROGRAM

## 2021-10-23 ASSESSMENT — MIFFLIN-ST. JEOR: SCORE: 1315.14

## 2021-10-23 NOTE — PROGRESS NOTES
[facility]  Sports Medicine Clinic  Clinics and Surgery Center           SUBJECTIVE       Nadira Perez is a 71 year old female presenting to clinic today with right 2nd toe pain.    Background:     Mentions that she has foot drag and does not always get into proper dorsiflexion.  Recently had more of a foot drag and end up significantly hyper-flexing her R 2nd toe and felt something pop/crack.    Currently noticing ecchymosis.  Mentions she has peripheral neuropathy that is currently being worked up by her PCP.  Patient is unable to feel most of her feet and does not feel pain today.    Date of injury: 10/21/21   Duration of symptoms: 2 days  Mechanism of Injury: Acute; Activity Related hyperflexion while walking  Intensity: 1/10 at rest, 4/10 with activity   Aggravating factors: walking, bumping  Relieving Factors: rest and ice  Prior Evaluation: None    PMH, Medications and Allergies were reviewed and updated as needed.    ROS:  As noted above otherwise negative.    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     Corneal opacity     Hypothyroidism     Osteopenia, unspecified location     Aromatase inhibitor use     Chronic pain of right knee     DDD (degenerative disc disease), lumbar     Degenerative scoliosis in adult patient     Spondylosis without myelopathy or radiculopathy, lumbosacral region     Right carpal tunnel syndrome       Current Outpatient Medications   Medication Sig Dispense  Refill     Acetaminophen (APAP 500 PO) Take 1,000 mg by mouth 3 times daily as needed        amLODIPine (NORVASC) 10 MG tablet Take 1 tablet (10 mg) by mouth daily 90 tablet 3     anastrozole (ARIMIDEX) 1 MG tablet TAKE 1 TABLET BY MOUTH EVERY DAY 90 tablet 3     Ascorbic Acid (VITAMIN C) 500 MG CHEW Take 1 tablet by mouth daily       atorvastatin (LIPITOR) 80 MG tablet Take 1 tablet (80 mg) by mouth daily 90 tablet 3     cholecalciferol 2000 units CAPS Take 6,000 Units by mouth daily 270 capsule 3     CRANBERRY EXTRACT PO Take 650 mg by mouth daily       cyclobenzaprine (FLEXERIL) 10 MG tablet Take 1 tablet (10 mg) by mouth 3 times daily as needed for muscle spasms 40 tablet 3     gabapentin (NEURONTIN) 300 MG capsule Take 1-2 capsules by mouth every 8 hours 540 capsule 3     HYDROcodone-acetaminophen (NORCO) 5-325 MG tablet Take 1 tablet by mouth every 6 hours as needed for severe pain 40 tablet 0     ibuprofen (ADVIL/MOTRIN) 200 MG tablet Take 600 mg by mouth every 8 hours as needed for moderate pain        levothyroxine (SYNTHROID/LEVOTHROID) 112 MCG tablet TAKE 1/2 TABLET BY MOUTH DAILY 45 tablet 3     lisinopril (ZESTRIL) 40 MG tablet TAKE 1 TABLET BY MOUTH EVERY DAY 90 tablet 0     Magnesium Oxide 250 MG TABS Take 1 tablet by mouth daily       melatonin 5 MG tablet Take 10 mg by mouth daily       metoclopramide (REGLAN) 5 MG tablet Take 1 tablet (5 mg) by mouth 3 times daily as needed (nausea) Avoid taking on a daily basis 30 tablet 3     metoprolol succinate ER (TOPROL-XL) 50 MG 24 hr tablet Take 1 tablet (50 mg) by mouth daily 90 tablet 3     Multiple Vitamin (MULTI-VITAMIN) per tablet Take 1 tablet by mouth daily.       Omega-3 Fatty Acids (OMEGA-3 FISH OIL PO) Take 2 g by mouth daily        ondansetron (ZOFRAN-ODT) 4 MG ODT tab Take 1 tablet (4 mg) by mouth every 12 hours as needed for nausea 180 tablet 3     potassium chloride ER (KLOR-CON M) 20 MEQ CR tablet Take 20 meq four times a week. 90 tablet 3  "    terbinafine (LAMISIL AT) 1 % external cream Apply topically 2 times daily For fungal infection not resolved with other antifungals (e.g. Clotrimazole) 24 g 11     tiZANidine (ZANAFLEX) 2 MG tablet Take 1-2 tablets (2-4 mg) by mouth 3 times daily as needed for muscle spasms 90 tablet 1     triamcinolone (KENALOG) 0.1 % external ointment Apply topically 2 times daily 30 g 11     triamterene-HCTZ (MAXZIDE-25) 37.5-25 MG tablet Take 1 tablet by mouth daily 90 tablet 3     Vaginal Lubricant (REPHRESH) GEL Place 2 g vaginally every 3 days 14 Box 3     VOLTAREN 1 % topical gel Apply 4 grams to knees or 2 grams to hands four times daily using enclosed dosing card. 100 g 1            OBJECTIVE:       Vitals:   Vitals:    10/23/21 1019   Weight: 75.8 kg (167 lb)   Height: 1.702 m (5' 7\")     BMI: Body mass index is 26.16 kg/m .    Gen:  Well nourished and in no acute distress  HEENT: Extraocular movement intact  Neck: Supple  Pulm:  Breathing Comfortably. No increased respiratory effort.  Psych: Euthymic. Appropriately answers questions    MSK: Examination of her right toe shows extensive ecchymosis on the dorsal aspect of the second digit.  No noticeable ligamentous laxity or palpable tenderness to palpation, however given the marked neuropathy this is not surprising.      XRAY : On personal review, concern for a middle phalanx minimally displaced, comminuted, fracture noticed on oblique view.  Official read below.    Study Result    Narrative & Impression   Exam: 3 views of the right first and second toe dated 10/23/2021.     COMPARISON: 11/29/2020.     CLINICAL HISTORY: Pain.     FINDINGS: 3 views of the right second toe was obtained. The bones are  well maintained. Degenerative changes with spurring at the right  second toe PIP joint. No displaced fractures.                                                                      IMPRESSION: Mild degenerative changes at the right second toe PIP  joint.               " ASSESSMENT and PLAN:     aNdira was seen today for pain.    Diagnoses and all orders for this visit:    Pain in right toe(s)    71-year-old female with marked neuropathy of unknown etiology presenting with 2-day history of right foot, second toe pain and extensive ecchymosis.  Overall presentation given the mechanism would be more concerning for an extensor tendon injury as she had significant bruising yesterday that is now resolving today, and heard/felt a pop while her toe got caught in flexion underneath her body.  X-rays today are concerning for a right middle phalanx of the second toe mildly displaced comminuted fracture involving the most distal aspect.  Official x-ray read without evidence of fractures.  Attempted to call down to reading room for clarification but there was no answer.    Plan: Given the mechanism, as well as what I have seen visually on imaging today, I have elected to proceed to treat the patient for the middle phalanx fracture.  We will buddy tape her as well as offer a postop shoe for comfort.  She may not need the postop shoe given her neuropathy, and I do not want this to be a barrier or a mechanism for her falling.  She will try it on and determine if this is going to work for her or not.  Ice, rest, and over-the-counter analgesics as needed.  Would like her to follow-up within a week for repeat imaging and to assess her function.  If there are still concerns and x-rays are negative for fracture again, may need advanced imaging or ultrasound to evaluate the extensor tendon.      Options for treatment and/or follow-up care were reviewed with the patient was actively involved in the decision making process. Patient verbalized understanding and was in agreement with the plan.    Rolan Morgan DO  , Sports Medicine  Department of Family Medicine and [facility]

## 2021-10-23 NOTE — PROGRESS NOTES
Subjective:   Nadira Perez is a 71 year old female who presents with R 2nd toe pain.    Background:     Mentions that she has foot drag and does not always get into proper dorsiflexion.  Recently had more of a foot drag and end up significantly hyper-flexing her R 2nd toe and felt something pop/crack.    Currently noticing ecchymosis.  Mentions she has peripheral neuropathy.    Date of injury: 10/21/21   Duration of symptoms: 2 days  Mechanism of Injury: Acute; Activity Related hyperflexion while walking  Intensity: 1/10 at rest, 4/10 with activity   Aggravating factors: walking, bumping  Relieving Factors: rest and ice  Prior Evaluation: None

## 2021-10-23 NOTE — LETTER
10/23/2021      RE: Nadira Perez  [address]  [facility] [address]        Subjective:   Nadira Perez is a 71 year old female who presents with R 2nd toe pain.    Background:     Mentions that she has foot drag and does not always get into proper dorsiflexion.  Recently had more of a foot drag and end up significantly hyper-flexing her R 2nd toe and felt something pop/crack.    Currently noticing ecchymosis.  Mentions she has peripheral neuropathy.    Date of injury: 10/21/21   Duration of symptoms: 2 days  Mechanism of Injury: Acute; Activity Related hyperflexion while walking  Intensity: 1/10 at rest, 4/10 with activity   Aggravating factors: walking, bumping  Relieving Factors: rest and ice  Prior Evaluation: None      [facility]  Sports Medicine Clinic  Clinics and Surgery Center           SUBJECTIVE       Nadira Perez is a 71 year old female presenting to clinic today with right 2nd toe pain.    Background:     Mentions that she has foot drag and does not always get into proper dorsiflexion.  Recently had more of a foot drag and end up significantly hyper-flexing her R 2nd toe and felt something pop/crack.    Currently noticing ecchymosis.  Mentions she has peripheral neuropathy that is currently being worked up by her PCP.  Patient is unable to feel most of her feet and does not feel pain today.    Date of injury: 10/21/21   Duration of symptoms: 2 days  Mechanism of Injury: Acute; Activity Related hyperflexion while walking  Intensity: 1/10 at rest, 4/10 with activity   Aggravating factors: walking, bumping  Relieving Factors: rest and ice  Prior Evaluation: None    PMH, Medications and Allergies were reviewed and updated as needed.    ROS:  As noted above otherwise negative.    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     Corneal opacity     Hypothyroidism     Osteopenia, unspecified location     Aromatase inhibitor use     Chronic pain of right knee     DDD (degenerative disc disease), lumbar     Degenerative scoliosis in adult patient     Spondylosis without myelopathy or radiculopathy, lumbosacral region     Right carpal tunnel syndrome       Current Outpatient Medications   Medication Sig Dispense Refill     Acetaminophen (APAP 500 PO) Take 1,000 mg by mouth 3 times daily as needed        amLODIPine (NORVASC) 10 MG tablet Take 1 tablet (10 mg) by mouth daily 90 tablet 3     anastrozole (ARIMIDEX) 1 MG tablet TAKE 1 TABLET BY MOUTH EVERY DAY 90 tablet 3     Ascorbic Acid (VITAMIN C) 500 MG CHEW Take 1 tablet by mouth daily       atorvastatin (LIPITOR) 80 MG tablet Take 1 tablet (80 mg) by mouth daily 90 tablet 3     cholecalciferol 2000 units CAPS Take 6,000 Units by mouth daily 270 capsule 3     CRANBERRY EXTRACT PO Take 650 mg by mouth daily       cyclobenzaprine (FLEXERIL) 10 MG tablet Take 1 tablet (10 mg) by mouth 3 times daily as needed for muscle spasms 40 tablet 3     gabapentin (NEURONTIN) 300 MG capsule Take 1-2 capsules by mouth every 8 hours 540 capsule 3     HYDROcodone-acetaminophen (NORCO) 5-325 MG tablet Take 1 tablet by mouth every 6 hours as needed for severe pain 40 tablet 0     ibuprofen (ADVIL/MOTRIN) 200 MG tablet Take 600 mg by mouth every 8 hours as needed for moderate pain        levothyroxine (SYNTHROID/LEVOTHROID) 112 MCG tablet TAKE 1/2 TABLET BY MOUTH DAILY 45 tablet 3     lisinopril (ZESTRIL) 40 MG tablet TAKE 1 TABLET BY MOUTH EVERY DAY 90  "tablet 0     Magnesium Oxide 250 MG TABS Take 1 tablet by mouth daily       melatonin 5 MG tablet Take 10 mg by mouth daily       metoclopramide (REGLAN) 5 MG tablet Take 1 tablet (5 mg) by mouth 3 times daily as needed (nausea) Avoid taking on a daily basis 30 tablet 3     metoprolol succinate ER (TOPROL-XL) 50 MG 24 hr tablet Take 1 tablet (50 mg) by mouth daily 90 tablet 3     Multiple Vitamin (MULTI-VITAMIN) per tablet Take 1 tablet by mouth daily.       Omega-3 Fatty Acids (OMEGA-3 FISH OIL PO) Take 2 g by mouth daily        ondansetron (ZOFRAN-ODT) 4 MG ODT tab Take 1 tablet (4 mg) by mouth every 12 hours as needed for nausea 180 tablet 3     potassium chloride ER (KLOR-CON M) 20 MEQ CR tablet Take 20 meq four times a week. 90 tablet 3     terbinafine (LAMISIL AT) 1 % external cream Apply topically 2 times daily For fungal infection not resolved with other antifungals (e.g. Clotrimazole) 24 g 11     tiZANidine (ZANAFLEX) 2 MG tablet Take 1-2 tablets (2-4 mg) by mouth 3 times daily as needed for muscle spasms 90 tablet 1     triamcinolone (KENALOG) 0.1 % external ointment Apply topically 2 times daily 30 g 11     triamterene-HCTZ (MAXZIDE-25) 37.5-25 MG tablet Take 1 tablet by mouth daily 90 tablet 3     Vaginal Lubricant (REPHRESH) GEL Place 2 g vaginally every 3 days 14 Box 3     VOLTAREN 1 % topical gel Apply 4 grams to knees or 2 grams to hands four times daily using enclosed dosing card. 100 g 1            OBJECTIVE:       Vitals:   Vitals:    10/23/21 1019   Weight: 75.8 kg (167 lb)   Height: 1.702 m (5' 7\")     BMI: Body mass index is 26.16 kg/m .    Gen:  Well nourished and in no acute distress  HEENT: Extraocular movement intact  Neck: Supple  Pulm:  Breathing Comfortably. No increased respiratory effort.  Psych: Euthymic. Appropriately answers questions    MSK: Examination of her right toe shows extensive ecchymosis on the dorsal aspect of the second digit.  No noticeable ligamentous laxity or " palpable tenderness to palpation, however given the marked neuropathy this is not surprising.      XRAY : On personal review, concern for a middle phalanx minimally displaced, comminuted, fracture noticed on oblique view.  Official read below.    Study Result    Narrative & Impression   Exam: 3 views of the right first and second toe dated 10/23/2021.     COMPARISON: 11/29/2020.     CLINICAL HISTORY: Pain.     FINDINGS: 3 views of the right second toe was obtained. The bones are  well maintained. Degenerative changes with spurring at the right  second toe PIP joint. No displaced fractures.                                                                      IMPRESSION: Mild degenerative changes at the right second toe PIP  joint.               ASSESSMENT and PLAN:     Nadira was seen today for pain.    Diagnoses and all orders for this visit:    Pain in right toe(s)    71-year-old female with marked neuropathy of unknown etiology presenting with 2-day history of right foot, second toe pain and extensive ecchymosis.  Overall presentation given the mechanism would be more concerning for an extensor tendon injury as she had significant bruising yesterday that is now resolving today, and heard/felt a pop while her toe got caught in flexion underneath her body.  X-rays today are concerning for a right middle phalanx of the second toe mildly displaced comminuted fracture involving the most distal aspect.  Official x-ray read without evidence of fractures.  Attempted to call down to reading room for clarification but there was no answer.    Plan: Given the mechanism, as well as what I have seen visually on imaging today, I have elected to proceed to treat the patient for the middle phalanx fracture.  We will dorian tape her as well as offer a postop shoe for comfort.  She may not need the postop shoe given her neuropathy, and I do not want this to be a barrier or a mechanism for her falling.  She will try it on and  determine if this is going to work for her or not.  Ice, rest, and over-the-counter analgesics as needed.  Would like her to follow-up within a week for repeat imaging and to assess her function.  If there are still concerns and x-rays are negative for fracture again, may need advanced imaging or ultrasound to evaluate the extensor tendon.      Options for treatment and/or follow-up care were reviewed with the patient was actively involved in the decision making process. Patient verbalized understanding and was in agreement with the plan.    Rolan Morgan DO  , Sports Medicine  Department of Family Medicine and [facility]        Rolan Morgan DO

## 2021-10-25 NOTE — TELEPHONE ENCOUNTER
Dr Conley contacted [place] and discussed patient concerns over telephone. Christina Cox RN

## 2021-10-26 ENCOUNTER — THERAPY VISIT (OUTPATIENT)
Dept: PHYSICAL THERAPY | Facility: CLINIC | Age: 71
End: 2021-10-26
Payer: COMMERCIAL

## 2021-10-26 DIAGNOSIS — M47.817 SPONDYLOSIS WITHOUT MYELOPATHY OR RADICULOPATHY, LUMBOSACRAL REGION: ICD-10-CM

## 2021-10-26 PROCEDURE — 97110 THERAPEUTIC EXERCISES: CPT | Mod: GP | Performed by: PHYSICAL THERAPY ASSISTANT

## 2021-10-27 ENCOUNTER — OFFICE VISIT (OUTPATIENT)
Dept: OPHTHALMOLOGY | Facility: CLINIC | Age: 71
End: 2021-10-27
Attending: OPHTHALMOLOGY
Payer: COMMERCIAL

## 2021-10-27 DIAGNOSIS — G62.9 PERIPHERAL POLYNEUROPATHY: Chronic | ICD-10-CM

## 2021-10-27 DIAGNOSIS — H35.372 EPIRETINAL MEMBRANE (ERM) OF LEFT EYE: ICD-10-CM

## 2021-10-27 DIAGNOSIS — Z96.1 PSEUDOPHAKIA OF BOTH EYES: ICD-10-CM

## 2021-10-27 DIAGNOSIS — H43.812 PVD (POSTERIOR VITREOUS DETACHMENT), LEFT EYE: ICD-10-CM

## 2021-10-27 DIAGNOSIS — H26.492 PCO (POSTERIOR CAPSULAR OPACIFICATION), LEFT: ICD-10-CM

## 2021-10-27 DIAGNOSIS — H33.312 RETINAL TEAR OF LEFT EYE: Primary | ICD-10-CM

## 2021-10-27 PROCEDURE — G0463 HOSPITAL OUTPT CLINIC VISIT: HCPCS

## 2021-10-27 PROCEDURE — 99024 POSTOP FOLLOW-UP VISIT: CPT | Performed by: OPHTHALMOLOGY

## 2021-10-27 ASSESSMENT — VISUAL ACUITY
CORRECTION_TYPE: GLASSES
METHOD: SNELLEN - LINEAR
METHOD_MR: PT. DEFERS
OS_SC: 20/40
OD_SC: 20/25
OD_SC+: -1

## 2021-10-27 ASSESSMENT — SLIT LAMP EXAM - LIDS
COMMENTS: NORMAL
COMMENTS: NORMAL

## 2021-10-27 ASSESSMENT — EXTERNAL EXAM - RIGHT EYE: OD_EXAM: NORMAL

## 2021-10-27 ASSESSMENT — TONOMETRY
IOP_METHOD: TONOPEN
OS_IOP_MMHG: 18
OD_IOP_MMHG: 18

## 2021-10-27 ASSESSMENT — EXTERNAL EXAM - LEFT EYE: OS_EXAM: NORMAL

## 2021-10-27 ASSESSMENT — CUP TO DISC RATIO
OD_RATIO: 0.4
OS_RATIO: 0.45

## 2021-10-27 NOTE — NURSING NOTE
Chief Complaints and History of Present Illnesses   Patient presents with     Post Op (Ophthalmology) Left Eye     Chief Complaint(s) and History of Present Illness(es)     Post Op (Ophthalmology) Left Eye     Laterality: left eye    Onset: 1 month ago              Comments     S/p Yag cap LE-Pt. States that she is still seeing an occasional film over LE but overall improved. Has still josseline wanting to keep LE shut. Still some dryness BE.   Letitia Nagy COT 2:17 PM October 27, 2021

## 2021-10-27 NOTE — PROGRESS NOTES
CC: Blurry vision left eye     Interval History:  she still has a little of floaters / dark spot - now it getting smaller. No longer having flashes of lights. She is disturbed by the floaters in her eye. Not having flashes of lights.  Has YAG Capsulotomy left eye last visit.     HPI: Patient fell 9/24/2020.  Blurry vision left eye starting around 10/2/2020. Noted to have HST - now s/p barricade laser 1/18/2021.    Assessment/plan:   1.  Posterior vitreous detachment (PVD) with sub hyaloid hemorrhage OS   - Fall 9/24/2020; acute blurry vision 10/2/2020   - HST at inferotemporal periphery - s/p retinopexy 1/18/21   - S/Sx of RD discussed for immediate return    2. Retinal tear left eye   - see #1; s/p barricade laser 1/18/21    3. Pseudophakia each eye   - both eye s/p yag capsulotomy each eye    - left eye remnants of PCO tethered may move and interfere with vision or add to her floaters; consider floaterectomy in future if symptomatic    4. Dry eye each eye   - ATs PRN    5. ERM left eye   - Mild; observe    RTC: 4-6 M for DFE      Complete documentation of historical and exam elements from today's encounter can be found in the full encounter summary report (not reduplicated in this progress note). I personally obtained the chief complaint(s) and history of present illness.  I confirmed and edited as necessary the review of systems, past medical/surgical history, family history, social history, and examination findings as documented by others; and I examined the patient myself. I personally reviewed the relevant tests, images, and reports as documented above. I formulated and edited as necessary the assessment and plan and discussed the findings and management plan with the patient and family.    Felix Oliveros MD

## 2021-10-28 ENCOUNTER — OFFICE VISIT (OUTPATIENT)
Dept: INTERNAL MEDICINE | Facility: CLINIC | Age: 71
End: 2021-10-28
Payer: COMMERCIAL

## 2021-10-28 VITALS
SYSTOLIC BLOOD PRESSURE: 137 MMHG | RESPIRATION RATE: 16 BRPM | BODY MASS INDEX: 26.21 KG/M2 | WEIGHT: 167 LBS | HEART RATE: 78 BPM | HEIGHT: 67 IN | OXYGEN SATURATION: 97 % | DIASTOLIC BLOOD PRESSURE: 90 MMHG

## 2021-10-28 DIAGNOSIS — I10 ESSENTIAL HYPERTENSION, BENIGN: ICD-10-CM

## 2021-10-28 DIAGNOSIS — M79.2 NEUROPATHIC PAIN: ICD-10-CM

## 2021-10-28 DIAGNOSIS — Z13.1 SCREENING FOR DIABETES MELLITUS: ICD-10-CM

## 2021-10-28 DIAGNOSIS — Z01.818 PREOPERATIVE EXAMINATION: Primary | ICD-10-CM

## 2021-10-28 DIAGNOSIS — M79.674 PAIN IN RIGHT TOE(S): Primary | ICD-10-CM

## 2021-10-28 DIAGNOSIS — L30.9 ECZEMA, UNSPECIFIED TYPE: ICD-10-CM

## 2021-10-28 DIAGNOSIS — N95.2 ATROPHIC VAGINITIS: ICD-10-CM

## 2021-10-28 DIAGNOSIS — M25.561 RIGHT KNEE PAIN, UNSPECIFIED CHRONICITY: ICD-10-CM

## 2021-10-28 DIAGNOSIS — E03.9 HYPOTHYROIDISM, UNSPECIFIED TYPE: ICD-10-CM

## 2021-10-28 DIAGNOSIS — M54.41 CHRONIC RIGHT-SIDED LOW BACK PAIN WITH RIGHT-SIDED SCIATICA: ICD-10-CM

## 2021-10-28 DIAGNOSIS — R73.09 ELEVATED GLUCOSE: ICD-10-CM

## 2021-10-28 DIAGNOSIS — E87.6 HYPOPOTASSEMIA: ICD-10-CM

## 2021-10-28 DIAGNOSIS — I10 BENIGN ESSENTIAL HYPERTENSION: ICD-10-CM

## 2021-10-28 DIAGNOSIS — G89.29 CHRONIC RIGHT-SIDED LOW BACK PAIN WITH RIGHT-SIDED SCIATICA: ICD-10-CM

## 2021-10-28 DIAGNOSIS — E55.9 HYPOVITAMINOSIS D: ICD-10-CM

## 2021-10-28 DIAGNOSIS — E78.00 PURE HYPERCHOLESTEROLEMIA: ICD-10-CM

## 2021-10-28 DIAGNOSIS — Z13.828 SCREENING FOR RHEUMATOID ARTHRITIS: ICD-10-CM

## 2021-10-28 DIAGNOSIS — M54.40 ACUTE MIDLINE LOW BACK PAIN WITH SCIATICA, SCIATICA LATERALITY UNSPECIFIED: ICD-10-CM

## 2021-10-28 DIAGNOSIS — B35.3 TINEA PEDIS OF BOTH FEET: ICD-10-CM

## 2021-10-28 PROCEDURE — 99397 PER PM REEVAL EST PAT 65+ YR: CPT | Performed by: NURSE PRACTITIONER

## 2021-10-28 RX ORDER — POTASSIUM CHLORIDE 1500 MG/1
TABLET, EXTENDED RELEASE ORAL
Qty: 90 TABLET | Refills: 3 | Status: SHIPPED | OUTPATIENT
Start: 2021-10-28 | End: 2021-11-02

## 2021-10-28 ASSESSMENT — ENCOUNTER SYMPTOMS
TROUBLE SWALLOWING: 1
ABDOMINAL PAIN: 0
LOSS OF CONSCIOUSNESS: 0
HEMATURIA: 0
EYE REDNESS: 0
DIFFICULTY URINATING: 1
DYSURIA: 0
EYE IRRITATION: 0
FLANK PAIN: 0
SKIN CHANGES: 1
NUMBNESS: 1
BACK PAIN: 1
SMELL DISTURBANCE: 0
BLOATING: 0
POSTURAL DYSPNEA: 0
SHORTNESS OF BREATH: 0
WEAKNESS: 1
MUSCLE WEAKNESS: 1
MUSCLE CRAMPS: 1
SPUTUM PRODUCTION: 0
DISTURBANCES IN COORDINATION: 1
NAUSEA: 1
MEMORY LOSS: 0
NECK MASS: 0
HYPOTENSION: 0
BLOOD IN STOOL: 0
COUGH: 0
SYNCOPE: 0
SINUS PAIN: 0
POOR WOUND HEALING: 0
HEADACHES: 0
SORE THROAT: 0
NECK PAIN: 1
HYPERTENSION: 1
DEPRESSION: 0
EYE PAIN: 0
TREMORS: 0
DIZZINESS: 1
JOINT SWELLING: 1
HEMOPTYSIS: 0
LEG PAIN: 1
SEIZURES: 0
NAIL CHANGES: 0
DECREASED LIBIDO: 1
MYALGIAS: 1
SLEEP DISTURBANCES DUE TO BREATHING: 0
HEARTBURN: 0
PARALYSIS: 0
DYSPNEA ON EXERTION: 0
SNORES LOUDLY: 1
BOWEL INCONTINENCE: 0
HOT FLASHES: 0
INSOMNIA: 1
HOARSE VOICE: 0
COUGH DISTURBING SLEEP: 0
SPEECH CHANGE: 0
DECREASED CONCENTRATION: 0
WHEEZING: 0
TASTE DISTURBANCE: 0
LIGHT-HEADEDNESS: 1
NERVOUS/ANXIOUS: 0
DOUBLE VISION: 0
EYE WATERING: 0
JAUNDICE: 0
RECTAL PAIN: 0
EXERCISE INTOLERANCE: 0
DIARRHEA: 0
PANIC: 0
PALPITATIONS: 1
VOMITING: 1
SINUS CONGESTION: 0
TINGLING: 1
ARTHRALGIAS: 1
ORTHOPNEA: 0
STIFFNESS: 1
CONSTIPATION: 0

## 2021-10-28 ASSESSMENT — PAIN SCALES - GENERAL: PAINLEVEL: NO PAIN (0)

## 2021-10-28 ASSESSMENT — MIFFLIN-ST. JEOR: SCORE: 1315.14

## 2021-10-28 NOTE — PROGRESS NOTES
S:  Nadira Perez is a 71 year old female who presents for annual exam. 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     She has appts, for upcoming visits.  She has labs ordered for future,    She had a right carpel tunnel injection last week with Dr. Lam.    Eye and dental UTD.     She exercises 50 minutes 3 x a week.       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     Corneal opacity     Hypothyroidism     Osteopenia, unspecified location     Aromatase inhibitor use     Chronic pain of right knee     DDD (degenerative disc disease), lumbar     Degenerative scoliosis in adult patient     Spondylosis without myelopathy or radiculopathy, lumbosacral region     Right carpal tunnel syndrome     Peripheral neuropathy            Past Medical History:   Diagnosis Date     Arthritis      Bone disease      Breast cancer (H)      H/O kyphoplasty      Hearing problem      History of kidney stones      History of radiation therapy      Hyperlipemia      Hypertension      Hypopotassemia      Kidney problem      Lymph edema      Medullary sponge kidney      Osteopenia      PONV (postoperative nausea and vomiting)      Reduced vision      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     Thyroid disease             Past Surgical History:   Procedure Laterality Date     ABDOMEN SURGERY      ovarian cyst, mesh     ARTHRODESIS WRIST        ARTHRODESIS WRIST  2/14/2013    Procedure: ARTHRODESIS WRIST;  left wrist scaphoid excision, four bone fusion, iliac crest bone graft  ( Mac with block);  Surgeon: Av Mendez MD;  [facility]     BIOPSY      skin, vaginal     BIOPSY OF SKIN LESION       CATARACT IOL, RT/LT Right 03/13/2018     CATARACT IOL, RT/LT Left 02/20/2018     COLONOSCOPY  12/24/2013    Procedure: COMBINED COLONOSCOPY, SINGLE BIOPSY/POLYPECTOMY BY BIOPSY;  COLONOSCOPY;  Surgeon: Dom Alvarez MD;  Location:  GI     COSMETIC SURGERY       ESOPHAGOSCOPY, GASTROSCOPY, DUODENOSCOPY (EGD), COMBINED N/A 11/23/2016    Procedure: COMBINED ESOPHAGOSCOPY, GASTROSCOPY, DUODENOSCOPY (EGD);  Surgeon: Quinten Feliciano MD;  Location:  GI     EXTERNAL EAR SURGERY      right     EYE SURGERY      radial keratomy     GRAFT BONE FROM ILIAC CREST  2/14/2013    Procedure: GRAFT BONE FROM ILIAC CREST;  mac with block and local infilitration;  Surgeon: Av Mendez MD;  [facility]: [facility]     HC BREATH HYDROGEN TEST N/A 10/14/2016    Procedure: HYDROGEN BREATH TEST;  Surgeon: Cheri Barron MD;  Location:  GI     HERNIA REPAIR      UMBILICAL     HERNIA REPAIR      umbilical age 18 mos.     HERNIA REPAIR       HERNIORRHAPHY UMBILICAL  [birth year]31/1952     HYSTERECTOMY TOTAL ABDOMINAL  5/3/00     HYSTERECTOMY TOTAL ABDOMINAL  5/3/2000     MASTECTOMY      PROPHYLACTIC RIGHT BREAST     MASTECTOMY MODIFIED RADICAL      LEFT BREAST     MASTECTOMY MODIFIED RADICAL      bilateral; right breast prophylactic     PARATHYROIDECTOMY  9/23/04    R SUPERIOR     PARATHYROIDECTOMY  9/23/2004    parathyroid resection, subtotal     REMOVE HARDWARE HAND  9/24/2013    Procedure: REMOVE HARDWARE HAND;  Left Hand Screw Removal        RHINOPLASTY  12/31/1985     RHINOPLASTY  12/31/1985     thyr proc skin closed cosmetic manner by subcuticular stitch  1/23/2009     THYROPLASTY  10/09/2009     THYROPLASTY  10/09/2009     TONSILLECTOMY  3/1/68      TONSILLECTOMY  3/1/1968     WRIST SURGERY      wrist arthrodesis            Social History     Tobacco Use     Smoking status: Never Smoker     Smokeless tobacco: Never Used   Substance Use Topics     Alcohol use: Not Currently     Comment: Rare to occasional            Family History   Problem Relation Age of Onset     Heart Disease Father         AAA     Hypertension Father      Neurologic Disorder Mother         Anuerysm of Cerebral Artery, Dementia     Diabetes Mother      Thyroid Disease Mother         ,     Cerebrovascular Disease Mother      Dementia Mother      Osteoporosis Mother      Diabetes Maternal Grandmother      Asthma Maternal Grandmother      Diabetes Maternal Aunt         x2     Melanoma Maternal Aunt      Glaucoma Maternal Aunt      Circulatory Brother         Perihperal Neurophathy     Dementia Other      Cancer Other         malignant melanoma     Hypertension Other      Hypertension Other      Cerebrovascular Disease Other      Cerebrovascular Disease Other      Obesity Other      Respiratory Other      Chronic Obstructive Pulmonary Disease Maternal Grandfather         father     Asthma Maternal Grandfather      Breast Cancer Cousin      Cancer Other      Diabetes Other      Asthma Other      Macular Degeneration No family hx of      Coronary Artery Disease No family hx of      Hyperlipidemia No family hx of      Kidney Disease No family hx of      Thrombosis No family hx of      Arthritis No family hx of      Depression No family hx of      Mental Illness No family hx of      Substance Abuse No family hx of      Cystic Fibrosis No family hx of      Early Death No family hx of      Coronary Artery Disease Early Onset No family hx of      Heart Failure No family hx of      Bleeding Diathesis No family hx of      Ovarian Cancer No family hx of      Uterine Cancer No family hx of      Prostate Cancer No family hx of      Colorectal Cancer No family hx of      Pancreatic Cancer No family hx of       Lung Cancer No family hx of      Other Cancer No family hx of      Autoimmune Disease No family hx of      Unknown/Adopted No family hx of      Genetic Disorder No family hx of                Allergies   Allergen Reactions     Penicillins Hives            Current Outpatient Medications   Medication Sig Dispense Refill     Acetaminophen (APAP 500 PO) Take 1,000 mg by mouth 3 times daily as needed        amLODIPine (NORVASC) 10 MG tablet Take 1 tablet (10 mg) by mouth daily 90 tablet 3     anastrozole (ARIMIDEX) 1 MG tablet TAKE 1 TABLET BY MOUTH EVERY DAY 90 tablet 3     Ascorbic Acid (VITAMIN C) 500 MG CHEW Take 1 tablet by mouth daily       atorvastatin (LIPITOR) 80 MG tablet Take 1 tablet (80 mg) by mouth daily 90 tablet 3     Cholecalciferol (VITAMIN D3) 50 MCG (2000 UT) CAPS Take 6,000 Units by mouth daily 270 capsule 3     CRANBERRY EXTRACT PO Take 650 mg by mouth daily       cyclobenzaprine (FLEXERIL) 10 MG tablet Take 1 tablet (10 mg) by mouth 3 times daily as needed for muscle spasms 40 tablet 3     diclofenac (VOLTAREN) 1 % topical gel Apply 4 grams to knees or 2 grams to hands four times daily using enclosed dosing card. 100 g 1     gabapentin (NEURONTIN) 300 MG capsule Take 1-2 capsules by mouth every 8 hours 540 capsule 3     HYDROcodone-acetaminophen (NORCO) 5-325 MG tablet Take 1 tablet by mouth every 6 hours as needed for severe pain 40 tablet 0     ibuprofen (ADVIL/MOTRIN) 200 MG tablet Take 600 mg by mouth every 8 hours as needed for moderate pain        levothyroxine (SYNTHROID/LEVOTHROID) 112 MCG tablet TAKE 1/2 TABLET BY MOUTH DAILY 45 tablet 3     lisinopril (ZESTRIL) 40 MG tablet Take 1 tablet (40 mg) by mouth daily 90 tablet 3     melatonin 5 MG tablet Take 10 mg by mouth daily       metoprolol succinate ER (TOPROL-XL) 50 MG 24 hr tablet Take 1 tablet (50 mg) by mouth daily 90 tablet 3     Multiple Vitamin (MULTI-VITAMIN) per tablet Take 1 tablet by mouth daily.       ondansetron  (ZOFRAN-ODT) 4 MG ODT tab Take 1 tablet (4 mg) by mouth every 12 hours as needed for nausea 180 tablet 3     potassium chloride ER (KLOR-CON M) 20 MEQ CR tablet Take 20 meq three times daily. 90 tablet 3     terbinafine (LAMISIL AT) 1 % external cream Apply topically 2 times daily For fungal infection not resolved with other antifungals (e.g. Clotrimazole) 24 g 11     tiZANidine (ZANAFLEX) 2 MG tablet Take 1-2 tablets (2-4 mg) by mouth 3 times daily as needed for muscle spasms 90 tablet 1     triamcinolone (KENALOG) 0.1 % external ointment Apply topically 2 times daily 30 g 11     triamterene-HCTZ (MAXZIDE-25) 37.5-25 MG tablet Take 1 tablet by mouth daily 90 tablet 3     Vaginal Lubricant (REPHRESH) GEL Place 2 g vaginally every 3 days 2 g 3        REVIEW OF SYSTE          Answers for HPI/ROS submitted by the patient on 10/28/2021  General Symptoms: No  Skin Symptoms: Yes  HENT Symptoms: Yes  EYE SYMPTOMS: Yes  HEART SYMPTOMS: Yes  LUNG SYMPTOMS: Yes  INTESTINAL SYMPTOMS: Yes  URINARY SYMPTOMS: Yes  GYNECOLOGIC SYMPTOMS: Yes  BREAST SYMPTOMS: No  SKELETAL SYMPTOMS: Yes  BLOOD SYMPTOMS: No  NERVOUS SYSTEM SYMPTOMS: Yes  MENTAL HEALTH SYMPTOMS: Yes  Ear pain: No  Ear discharge: No  Hearing loss: Yes  Tinnitus: No  Nosebleeds: No  Congestion: No  Sinus pain: No  Trouble swallowing: Yes   Voice hoarseness: No  Mouth sores: Yes  Sore throat: No  Tooth pain: No  Gum tenderness: Yes  Bleeding gums: No  Change in taste: No  Change in sense of smell: No  Dry mouth: Yes  Hearing aid used: Yes  Neck lump: No  Changes in hair: Yes  Changes in moles/birth marks: Yes  Itching: Yes  Rashes: Yes  Changes in nails: No  Acne: No  Hair in places you don't want it: No  Change in facial hair: No  Warts: No  Non-healing sores: No  Scarring: No  Flaking of skin: No  Color changes of hands/feet in cold : No  Sun sensitivity: No  Skin thickening: No  Eye pain: No  Vision loss: No  Dry eyes: Yes  Watery eyes: No  Eye bulging: No  Double  vision: No  Flashing of lights: No  Spots: No  Floaters: Yes  Redness: No  Crossed eyes: No  Tunnel Vision: No  Yellowing of eyes: No  Eye irritation: No  Chest pain or pressure: No  Fast or irregular heartbeat: Yes  Pain in legs with walking: Yes  Trouble breathing while lying down: No  Fingers or toes appear blue: No  High blood pressure: Yes  Low blood pressure: No  Fainting: No  Murmurs: No  Pacemaker: No  Varicose veins: Yes  Edema or swelling: Yes  Wake up at night with shortness of breath: No  Light-headedness: Yes  Exercise intolerance: No  Cough: No  Sputum or phlegm: No  Coughing up blood: No  Difficulty breating or shortness of breath: No  Snoring: Yes  Wheezing: No  Difficulty breathing on exertion: No  Nighttime Cough: No  Difficulty breathing when lying flat: No  Heart burn or indigestion: No  Nausea: Yes  Vomiting: Yes  Abdominal pain: No  Bloating: No  Constipation: No  Diarrhea: No  Blood in stool: No  Black stools: No  Rectal or Anal pain: No  Fecal incontinence: No  Yellowing of skin or eyes: No  Vomit with blood: No  Change in stools: No  Trouble holding urine or incontinence: Yes  Pain or burning: No  Trouble starting or stopping: No  Increased frequency of urination: Yes  Blood in urine: No  Decreased frequency of urination: No  Frequent nighttime urination: Yes  Flank pain: No  Difficulty emptying bladder: Yes  Bleeding or spotting between periods: No  Heavy or painful periods: No  Irregular periods: No  Vaginal discharge: No  Hot flashes: No  Vaginal dryness: Yes  Genital ulcers: No  Reduced libido: Yes  Painful intercourse: Yes  Difficulty with sexual arousal: Yes  Post-menopausal bleeding: No  Back pain: Yes  Muscle aches: Yes  Neck pain: Yes  Swollen joints: Yes  Joint pain: Yes  Bone pain: Yes  Muscle cramps: Yes  Muscle weakness: Yes  Joint stiffness: Yes  Bone fracture: No  Trouble with coordination: Yes  Dizziness or trouble with balance: Yes  Fainting or black-out spells: No  Memory  "loss: No  Headache: No  Seizures: No  Speech problems: No  Tingling: Yes  Tremor: No  Weakness: Yes  Difficulty walking: Yes  Paralysis: No  Numbness: Yes  Nervous or Anxious: No  Depression: No  Trouble sleeping: Yes  Trouble thinking or concentrating: No  Mood changes: No  Panic attacks: NoMS:    O:   BP (!) 137/90 (BP Location: Right arm, Patient Position: Sitting, Cuff Size: Adult Regular)   Pulse 78   Resp 16   Ht 1.702 m (5' 7\")   Wt 75.8 kg (167 lb)   SpO2 97%   BMI 26.16 kg/m    GENERAL APPEARANCE: healthy, alert and no distress  EYES: EOMI,  PERRL, she wears eyeglasses  HENT: ear canals and TM's normal, bilateral hearing aides, andmouth without ulcers or lesions  RESP: lungs clear to auscultation - no rales, rhonchi or wheezes  CV: regular rates and rhythm, normal S1 S2, no S3 or S4 and no murmur, click or rub   ABDOMEN:  soft, nontender, no HSM or masses and bowel sounds normal  BREASTS: surgically absent. No chest wall masses.  NEURO: Grossly normal  MUSK: Grossly normal  SKIN: Grossly normal  LYMPH: neg axillary, cervical, supra and infraclavicular nodes.  EXT: warm.  Edema NO   PSYCHE: normal.    A/P:  Nadira was seen today for physical.    Diagnoses and all orders for this visit:    Preoperative examination  -     Comprehensive metabolic panel; Future  -     CBC with platelets; Future    Hypopotassemia  -     Discontinue: potassium chloride ER (KLOR-CON M) 20 MEQ CR tablet; Take 20 meq four daily.  -     potassium chloride ER (KLOR-CON M) 20 MEQ CR tablet; Take 20 meq three times daily.    Screening for diabetes mellitus    Elevated glucose  -     Hemoglobin A1c; Future    Benign essential hypertension  -     amLODIPine (NORVASC) 10 MG tablet; Take 1 tablet (10 mg) by mouth daily  -     lisinopril (ZESTRIL) 40 MG tablet; Take 1 tablet (40 mg) by mouth daily  -     metoprolol succinate ER (TOPROL-XL) 50 MG 24 hr tablet; Take 1 tablet (50 mg) by mouth daily    Pure hypercholesterolemia  -     " atorvastatin (LIPITOR) 80 MG tablet; Take 1 tablet (80 mg) by mouth daily    Neuropathic pain  -     gabapentin (NEURONTIN) 300 MG capsule; Take 1-2 capsules by mouth every 8 hours    Hypothyroidism, unspecified type  -     levothyroxine (SYNTHROID/LEVOTHROID) 112 MCG tablet; TAKE 1/2 TABLET BY MOUTH DAILY    Chronic right-sided low back pain with right-sided sciatica  -     tiZANidine (ZANAFLEX) 2 MG tablet; Take 1-2 tablets (2-4 mg) by mouth 3 times daily as needed for muscle spasms    Essential hypertension, benign  -     triamterene-HCTZ (MAXZIDE-25) 37.5-25 MG tablet; Take 1 tablet by mouth daily    Tinea pedis of both feet  -     terbinafine (LAMISIL AT) 1 % external cream; Apply topically 2 times daily For fungal infection not resolved with other antifungals (e.g. Clotrimazole)    Eczema, unspecified type  -     triamcinolone (KENALOG) 0.1 % external ointment; Apply topically 2 times daily    Right knee pain, unspecified chronicity  -     diclofenac (VOLTAREN) 1 % topical gel; Apply 4 grams to knees or 2 grams to hands four times daily using enclosed dosing card.    Acute midline low back pain with sciatica, sciatica laterality unspecified  -     cyclobenzaprine (FLEXERIL) 10 MG tablet; Take 1 tablet (10 mg) by mouth 3 times daily as needed for muscle spasms    Atrophic vaginitis  -     Vaginal Lubricant (REPHRESH) GEL; Place 2 g vaginally every 3 days    Hypovitaminosis D  -     Cholecalciferol (VITAMIN D3) 50 MCG (2000 UT) CAPS; Take 6,000 Units by mouth daily        Total time spent today with this patient including chart review, exam time with patient and documentation : 55 minutes      Matilde LONG, CNP

## 2021-10-28 NOTE — PROGRESS NOTES
[facility] Progress Note    10/29/21    Patient Name: Nadira Perez  MRN: [id number]    CHIEF COMPLAINT: right hand numbness and tingling following injections.    Dominant Hand: right      HPI:  Ms. Nadira Perez is a 71 year old female right hand dominant who presents with right hand pain. Previously seen at [facility] and here by me for right carpal tunnel syndrome and bilateral radiocarpal arthritis s/p scaphoidectomies and four corner fusions in the past. She was last seen 1 week ago when I performed bilateral radiocarpal injections and right carpal tunnel injection. States she has not noticed any symptomatic improvement of the right carpal tunnel symptoms. Admits she was unsure if she was supposed to return in 1 week or see another physician for her toe so she made this appointment.      REVIEW OF SYMPTOMS:      General: Negative   Eyes: Negative   Ear, Nose and Throat: Negative   Respiratory: Negative   Cardiovascular: Negative   Gastrointestinal: Negative   Genito-urinary: Negative   Musculoskeletal: Negative  Neurological: Negative   Psychological: Negative  HEME: Negative   ENDO: Negative   SKIN: Negative    VITALS:  There were no vitals filed for this visit.    EXAM:  General: NAD, A&Ox3  HEENT: NC/AT  CV: RRR by peripheral pulse  Pulmonary: Non-labored breathing on RA  RUE:  Well-healed dorsal wrist incision  Soft, compressible soft tissue swelling dorsal wrist just distal to incision, nontender, seems to envelop but does not move with extensor tendons. Improved from previous  Wrist extension 35, wrist flexion 90, baseline  Marked APB atrophy   Intact EPL/FPL/APB/HI  Diminished sensation median nerve distribution compared to ulnar and compared to contralateral. Normal sensation radial  2 point discrimination > 10 mm median, <5mm ulnar and radial  Negative Tinel's  +Durkan's compression test at carpal tunnel   Negative Spurling's     LUE:  Well-healed dorsal  wrist incision  Wrist extension 45, wrist flexion 80, baseline  Marked APB atrophy  Intact EPL/FPL/APB/HI  Mildly diminished sensation median nerve distribution compared to ulnar. Normal sensation radial  2 point discrimination 5 mm median, < 5mm ulnar and radial  Negative Tinel's  Negative Durkan's compression test at carpal tunnel   Negative Spurling's      IMPRESSION AND RECOMMENDATIONS:  Ms. Nadira Perez is a 71 year old year old female right hand dominant with the followin) Bilateral radiocarpal arthritis s/p scaphoid excisions w/ 4-corner fusions by Dr. Mendez  2) R carpal tunnel syndrome.    - 1 week s/p injections, progressing as appropriate. Discussed that it will take at least a week for steroid to start working, and that it may take several weeks to notice an effect    She is due to have lumbar surgery with Dr. Ramirez . If carpal tunnel symptoms continue to be very bothersome, can perform 1 more injection before her planned surgery to temporize her before carpal tunnel release.    Rolan Conley MD    Hand, Upper Extremity & Microvascular Surgery  Department of Orthopaedic Surgery  [facility]

## 2021-10-28 NOTE — PROGRESS NOTES
[facility]  [facility]  [facility] and Surgery Center           SUBJECTIVE       Nadira Perez is a 71 year old female presenting to clinic today as a f/u from her previous visit.    10/23/21: Pain in right toe(s)     71-year-old female with marked neuropathy of unknown etiology presenting with 2-day history of right foot, second toe pain and extensive ecchymosis.  Overall presentation given the mechanism would be more concerning for an extensor tendon injury as she had significant bruising yesterday that is now resolving today, and heard/felt a pop while her toe got caught in flexion underneath her body.  X-rays today are concerning for a right middle phalanx of the second toe mildly displaced comminuted fracture involving the most distal aspect.  Official x-ray read without evidence of fractures.  Attempted to call down to reading room for clarification but there was no answer.     Plan: Given the mechanism, as well as what I have seen visually on imaging today, I have elected to proceed to treat the patient for the middle phalanx fracture.  We will buddy tape her as well as offer a postop shoe for comfort.  She may not need the postop shoe given her neuropathy, and I do not want this to be a barrier or a mechanism for her falling.  She will try it on and determine if this is going to work for her or not.  Ice, rest, and over-the-counter analgesics as needed.  Would like her to follow-up within a week for repeat imaging and to assess her function.  If there are still concerns and x-rays are negative for fracture again, may need advanced imaging or ultrasound to evaluate the extensor tendon.      Interval HX: Doing well overall but still has pain, very mild, on the dorsum of the second toe middle phalanx.  Her sensation is overall lower because of neuropathy.  She has not been wearing the postop shoe but has continued to buddy tape.  X-rays have overall been unequivocal at this point.  No  new falls.  Persistent numbness within the foot.  No swelling.  Bruising still persisting.    PMH, Medications and Allergies were reviewed and updated as needed.    ROS:  As noted above otherwise negative.    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     Corneal opacity     Hypothyroidism     Osteopenia, unspecified location     Aromatase inhibitor use     Chronic pain of right knee     DDD (degenerative disc disease), lumbar     Degenerative scoliosis in adult patient     Spondylosis without myelopathy or radiculopathy, lumbosacral region     Right carpal tunnel syndrome     Peripheral neuropathy       Current Outpatient Medications   Medication Sig Dispense Refill     Acetaminophen (APAP 500 PO) Take 1,000 mg by mouth 3 times daily as needed        amLODIPine (NORVASC) 10 MG tablet Take 1 tablet (10 mg) by mouth daily 90 tablet 3     anastrozole (ARIMIDEX) 1 MG tablet TAKE 1 TABLET BY MOUTH EVERY DAY 90 tablet 3     Ascorbic Acid (VITAMIN C) 500 MG CHEW Take 1 tablet by mouth daily       atorvastatin (LIPITOR) 80 MG tablet Take 1 tablet (80 mg) by mouth daily 90 tablet 3     cholecalciferol 2000 units CAPS Take 6,000 Units by mouth daily 270 capsule 3     CRANBERRY EXTRACT PO Take 650 mg by mouth daily       cyclobenzaprine (FLEXERIL) 10 MG tablet Take 1 tablet (10 mg) by mouth 3 times daily as needed for muscle spasms 40 tablet 3     gabapentin (NEURONTIN) 300 MG capsule Take 1-2 capsules by mouth every 8 hours  "540 capsule 3     HYDROcodone-acetaminophen (NORCO) 5-325 MG tablet Take 1 tablet by mouth every 6 hours as needed for severe pain 40 tablet 0     ibuprofen (ADVIL/MOTRIN) 200 MG tablet Take 600 mg by mouth every 8 hours as needed for moderate pain        levothyroxine (SYNTHROID/LEVOTHROID) 112 MCG tablet TAKE 1/2 TABLET BY MOUTH DAILY 45 tablet 3     lisinopril (ZESTRIL) 40 MG tablet TAKE 1 TABLET BY MOUTH EVERY DAY 90 tablet 0     Magnesium Oxide 250 MG TABS Take 1 tablet by mouth daily       melatonin 5 MG tablet Take 10 mg by mouth daily       metoprolol succinate ER (TOPROL-XL) 50 MG 24 hr tablet Take 1 tablet (50 mg) by mouth daily 90 tablet 3     Multiple Vitamin (MULTI-VITAMIN) per tablet Take 1 tablet by mouth daily.       Omega-3 Fatty Acids (OMEGA-3 FISH OIL PO) Take 2 g by mouth daily        ondansetron (ZOFRAN-ODT) 4 MG ODT tab Take 1 tablet (4 mg) by mouth every 12 hours as needed for nausea 180 tablet 3     potassium chloride ER (KLOR-CON M) 20 MEQ CR tablet Take 20 meq four daily. 90 tablet 3     terbinafine (LAMISIL AT) 1 % external cream Apply topically 2 times daily For fungal infection not resolved with other antifungals (e.g. Clotrimazole) 24 g 11     tiZANidine (ZANAFLEX) 2 MG tablet Take 1-2 tablets (2-4 mg) by mouth 3 times daily as needed for muscle spasms 90 tablet 1     triamcinolone (KENALOG) 0.1 % external ointment Apply topically 2 times daily 30 g 11     triamterene-HCTZ (MAXZIDE-25) 37.5-25 MG tablet Take 1 tablet by mouth daily 90 tablet 3     Vaginal Lubricant (REPHRESH) GEL Place 2 g vaginally every 3 days 14 Box 3     VOLTAREN 1 % topical gel Apply 4 grams to knees or 2 grams to hands four times daily using enclosed dosing card. 100 g 1            OBJECTIVE:       Vitals:   Vitals:    10/29/21 1323   Weight: 75.8 kg (167 lb)   Height: 1.702 m (5' 7\")     BMI: Body mass index is 26.16 kg/m .    Gen:  Well nourished and in no acute distress  HEENT: Extraocular movement " intact  Neck: Supple  Pulm:  Breathing Comfortably. No increased respiratory effort.  Psych: Euthymic. Appropriately answers questions    MSK: Second right toe with continued ecchymosis at this point.  Tenderness to palpation within the joint line of the middle phalanx of the second toe.  Sensation is overall decreased on the dorsum of the foot but compression of the area leads to positive pain pattern.      XRAY : Unchanged from prior imaging.          ASSESSMENT and PLAN:     Nadira was seen today for follow up.    Diagnoses and all orders for this visit:    Pain in right toe(s)  -     MR Foot Right w/o Contrast; Future    71-year-old female with lower extremity peripheral polyneuropathy with questionable fracture of the second middle phalanx.  X-rays have been obtained and read was negative for fracture in the area.  I am overall concerned that her sensation is down and she has extensive ecchymosis that this would either be a fracture within the joint articulation or extensor tendon disruption even the mechanics of her fall.    Plan: At this time I recommend that she continue dorian taping for comfort.  Should order and have ordered an MRI of her foot for further evaluation and delineation since her sensation is at baseline diminished but compression of the joint leads to pain with persistent ecchymosis in the area.  I would like her to follow-up after MRI has been obtained for further discussion of management.  Weight-bear as tolerated.      Options for treatment and/or follow-up care were reviewed with the patient was actively involved in the decision making process. Patient verbalized understanding and was in agreement with the plan.    Rolan Morgan DO  , Sports Medicine  Department of Family [facility] and [facility]

## 2021-10-29 ENCOUNTER — OFFICE VISIT (OUTPATIENT)
Dept: ORTHOPEDICS | Facility: CLINIC | Age: 71
End: 2021-10-29
Payer: OTHER MISCELLANEOUS

## 2021-10-29 ENCOUNTER — ANCILLARY PROCEDURE (OUTPATIENT)
Dept: GENERAL RADIOLOGY | Facility: CLINIC | Age: 71
End: 2021-10-29
Payer: COMMERCIAL

## 2021-10-29 ENCOUNTER — OFFICE VISIT (OUTPATIENT)
Dept: ORTHOPEDICS | Facility: CLINIC | Age: 71
End: 2021-10-29
Payer: COMMERCIAL

## 2021-10-29 VITALS
HEIGHT: 67 IN | SYSTOLIC BLOOD PRESSURE: 130 MMHG | WEIGHT: 167 LBS | DIASTOLIC BLOOD PRESSURE: 88 MMHG | BODY MASS INDEX: 26.21 KG/M2

## 2021-10-29 VITALS — HEIGHT: 67 IN | WEIGHT: 167 LBS | BODY MASS INDEX: 26.21 KG/M2

## 2021-10-29 DIAGNOSIS — G56.01 RIGHT CARPAL TUNNEL SYNDROME: Primary | ICD-10-CM

## 2021-10-29 DIAGNOSIS — M79.674 PAIN IN RIGHT TOE(S): Primary | ICD-10-CM

## 2021-10-29 DIAGNOSIS — M79.674 PAIN IN RIGHT TOE(S): ICD-10-CM

## 2021-10-29 LAB — RADIOLOGIST FLAGS: NORMAL

## 2021-10-29 PROCEDURE — 73660 X-RAY EXAM OF TOE(S): CPT | Mod: RT | Performed by: RADIOLOGY

## 2021-10-29 PROCEDURE — 99214 OFFICE O/P EST MOD 30 MIN: CPT | Performed by: STUDENT IN AN ORGANIZED HEALTH CARE EDUCATION/TRAINING PROGRAM

## 2021-10-29 PROCEDURE — 99213 OFFICE O/P EST LOW 20 MIN: CPT | Performed by: STUDENT IN AN ORGANIZED HEALTH CARE EDUCATION/TRAINING PROGRAM

## 2021-10-29 ASSESSMENT — MIFFLIN-ST. JEOR
SCORE: 1315.14
SCORE: 1315.14

## 2021-10-29 NOTE — LETTER
10/29/2021         RE: Nadira Perez  [address]  [place] [address]        Dear Colleague,    Thank you for referring your patient, Nadira Perez, to the [facility] [place]. Please see a copy of my visit note below.    Orthopaedic Surgery Hand and Upper Extremity Clinic Progress Note    10/29/21    Patient Name: Nadira Perez  MRN: [id number]    CHIEF COMPLAINT: right hand numbness and tingling following injections.    Dominant Hand: right      HPI:  Ms. Nadira Perez is a 71 year old female right hand dominant who presents with right hand pain. Previously seen at [facility] and here by me for right carpal tunnel syndrome and bilateral radiocarpal arthritis s/p scaphoidectomies and four corner fusions in the past. She was last seen 1 week ago when I performed bilateral radiocarpal injections and right carpal tunnel injection. States she has not noticed any symptomatic improvement of the right carpal tunnel symptoms. Admits she was unsure if she was supposed to return in 1 week or see another physician for her toe so she made this appointment.      REVIEW OF SYMPTOMS:      General: Negative   Eyes: Negative   Ear, Nose and Throat: Negative   Respiratory: Negative   Cardiovascular: Negative   Gastrointestinal: Negative   Genito-urinary: Negative   Musculoskeletal: Negative  Neurological: Negative   Psychological: Negative  HEME: Negative   ENDO: Negative   SKIN: Negative    VITALS:  There were no vitals filed for this visit.    EXAM:  General: NAD, A&Ox3  HEENT: NC/AT  CV: RRR by peripheral pulse  Pulmonary: Non-labored breathing on RA  RUE:  Well-healed dorsal wrist incision  Soft, compressible soft tissue swelling dorsal wrist just distal to incision, nontender, seems to envelop but does not move with extensor tendons. Improved from previous  Wrist extension 35, wrist flexion 90, baseline  Marked APB atrophy   Intact EPL/FPL/APB/HI  Diminished sensation median  nerve distribution compared to ulnar and compared to contralateral. Normal sensation radial  2 point discrimination > 10 mm median, <5mm ulnar and radial  Negative Tinel's  +Durkan's compression test at carpal tunnel   Negative Spurling's     LUE:  Well-healed dorsal wrist incision  Wrist extension 45, wrist flexion 80, baseline  Marked APB atrophy  Intact EPL/FPL/APB/HI  Mildly diminished sensation median nerve distribution compared to ulnar. Normal sensation radial  2 point discrimination 5 mm median, < 5mm ulnar and radial  Negative Tinel's  Negative Durkan's compression test at carpal tunnel   Negative Spurling's      IMPRESSION AND RECOMMENDATIONS:  Ms. Nadira Perez is a 71 year old year old female right hand dominant with the followin) Bilateral radiocarpal arthritis s/p scaphoid excisions w/ 4-corner fusions by Dr. Mendez  2) R carpal tunnel syndrome.    - 1 week s/p injections, progressing as appropriate. Discussed that it will take at least a week for steroid to start working, and that it may take several weeks to notice an effect    She is due to have lumbar surgery with Dr. Ramirez . If carpal tunnel symptoms continue to be very bothersome, can perform 1 more injection before her planned surgery to temporize her before carpal tunnel release.    Rolan Conley MD    Hand, Upper Extremity & Microvascular Surgery  Department of Orthopaedic Surgery  [facility]          Again, thank you for allowing me to participate in the care of your patient.        Sincerely,        Rolan Conley MD

## 2021-10-29 NOTE — LETTER
10/29/2021      RE: Nadira Perez  [address]  [place] [address]       [facility]  Sports Medicine Clinic  Clinics and Surgery Center           SUBJECTIVE       Nadira Perez is a 71 year old female presenting to clinic today as a f/u from her previous visit.    10/23/21: Pain in right toe(s)     71-year-old female with marked neuropathy of unknown etiology presenting with 2-day history of right foot, second toe pain and extensive ecchymosis.  Overall presentation given the mechanism would be more concerning for an extensor tendon injury as she had significant bruising yesterday that is now resolving today, and heard/felt a pop while her toe got caught in flexion underneath her body.  X-rays today are concerning for a right middle phalanx of the second toe mildly displaced comminuted fracture involving the most distal aspect.  Official x-ray read without evidence of fractures.  Attempted to call down to reading room for clarification but there was no answer.     Plan: Given the mechanism, as well as what I have seen visually on imaging today, I have elected to proceed to treat the patient for the middle phalanx fracture.  We will buddy tape her as well as offer a postop shoe for comfort.  She may not need the postop shoe given her neuropathy, and I do not want this to be a barrier or a mechanism for her falling.  She will try it on and determine if this is going to work for her or not.  Ice, rest, and over-the-counter analgesics as needed.  Would like her to follow-up within a week for repeat imaging and to assess her function.  If there are still concerns and x-rays are negative for fracture again, may need advanced imaging or ultrasound to evaluate the extensor tendon.      Interval HX: Doing well overall but still has pain, very mild, on the dorsum of the second toe middle phalanx.  Her sensation is overall lower because of neuropathy.  She has not been wearing the postop shoe  but has continued to buddy tape.  X-rays have overall been unequivocal at this point.  No new falls.  Persistent numbness within the foot.  No swelling.  Bruising still persisting.    PMH, Medications and Allergies were reviewed and updated as needed.    ROS:  As noted above otherwise negative.    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     Corneal opacity     Hypothyroidism     Osteopenia, unspecified location     Aromatase inhibitor use     Chronic pain of right knee     DDD (degenerative disc disease), lumbar     Degenerative scoliosis in adult patient     Spondylosis without myelopathy or radiculopathy, lumbosacral region     Right carpal tunnel syndrome     Peripheral neuropathy       Current Outpatient Medications   Medication Sig Dispense Refill     Acetaminophen (APAP 500 PO) Take 1,000 mg by mouth 3 times daily as needed        amLODIPine (NORVASC) 10 MG tablet Take 1 tablet (10 mg) by mouth daily 90 tablet 3     anastrozole (ARIMIDEX) 1 MG tablet TAKE 1 TABLET BY MOUTH EVERY DAY 90 tablet 3     Ascorbic Acid (VITAMIN C) 500 MG CHEW Take 1 tablet by mouth daily       atorvastatin (LIPITOR) 80 MG tablet Take 1 tablet (80 mg) by mouth daily 90 tablet 3     cholecalciferol 2000 units CAPS Take 6,000 Units by mouth daily 270 capsule 3     CRANBERRY EXTRACT PO Take 650 mg by mouth daily       cyclobenzaprine (FLEXERIL) 10 MG tablet Take 1 tablet (10 mg) by mouth 3 times daily as needed for muscle spasms 40  "tablet 3     gabapentin (NEURONTIN) 300 MG capsule Take 1-2 capsules by mouth every 8 hours 540 capsule 3     HYDROcodone-acetaminophen (NORCO) 5-325 MG tablet Take 1 tablet by mouth every 6 hours as needed for severe pain 40 tablet 0     ibuprofen (ADVIL/MOTRIN) 200 MG tablet Take 600 mg by mouth every 8 hours as needed for moderate pain        levothyroxine (SYNTHROID/LEVOTHROID) 112 MCG tablet TAKE 1/2 TABLET BY MOUTH DAILY 45 tablet 3     lisinopril (ZESTRIL) 40 MG tablet TAKE 1 TABLET BY MOUTH EVERY DAY 90 tablet 0     Magnesium Oxide 250 MG TABS Take 1 tablet by mouth daily       melatonin 5 MG tablet Take 10 mg by mouth daily       metoprolol succinate ER (TOPROL-XL) 50 MG 24 hr tablet Take 1 tablet (50 mg) by mouth daily 90 tablet 3     Multiple Vitamin (MULTI-VITAMIN) per tablet Take 1 tablet by mouth daily.       Omega-3 Fatty Acids (OMEGA-3 FISH OIL PO) Take 2 g by mouth daily        ondansetron (ZOFRAN-ODT) 4 MG ODT tab Take 1 tablet (4 mg) by mouth every 12 hours as needed for nausea 180 tablet 3     potassium chloride ER (KLOR-CON M) 20 MEQ CR tablet Take 20 meq four daily. 90 tablet 3     terbinafine (LAMISIL AT) 1 % external cream Apply topically 2 times daily For fungal infection not resolved with other antifungals (e.g. Clotrimazole) 24 g 11     tiZANidine (ZANAFLEX) 2 MG tablet Take 1-2 tablets (2-4 mg) by mouth 3 times daily as needed for muscle spasms 90 tablet 1     triamcinolone (KENALOG) 0.1 % external ointment Apply topically 2 times daily 30 g 11     triamterene-HCTZ (MAXZIDE-25) 37.5-25 MG tablet Take 1 tablet by mouth daily 90 tablet 3     Vaginal Lubricant (REPHRESH) GEL Place 2 g vaginally every 3 days 14 Box 3     VOLTAREN 1 % topical gel Apply 4 grams to knees or 2 grams to hands four times daily using enclosed dosing card. 100 g 1            OBJECTIVE:       Vitals:   Vitals:    10/29/21 1323   Weight: 75.8 kg (167 lb)   Height: 1.702 m (5' 7\")     BMI: Body mass index is 26.16 " kg/m .    Gen:  Well nourished and in no acute distress  HEENT: Extraocular movement intact  Neck: Supple  Pulm:  Breathing Comfortably. No increased respiratory effort.  Psych: Euthymic. Appropriately answers questions    MSK: Second right toe with continued ecchymosis at this point.  Tenderness to palpation within the joint line of the middle phalanx of the second toe.  Sensation is overall decreased on the dorsum of the foot but compression of the area leads to positive pain pattern.      XRAY : Unchanged from prior imaging.          ASSESSMENT and PLAN:     Nadira was seen today for follow up.    Diagnoses and all orders for this visit:    Pain in right toe(s)  -     MR Foot Right w/o Contrast; Future    71-year-old female with lower extremity peripheral polyneuropathy with questionable fracture of the second middle phalanx.  X-rays have been obtained and read was negative for fracture in the area.  I am overall concerned that her sensation is down and she has extensive ecchymosis that this would either be a fracture within the joint articulation or extensor tendon disruption even the mechanics of her fall.    Plan: At this time I recommend that she continue dorian taping for comfort.  Should order and have ordered an MRI of her foot for further evaluation and delineation since her sensation is at baseline diminished but compression of the joint leads to pain with persistent ecchymosis in the area.  I would like her to follow-up after MRI has been obtained for further discussion of management.  Weight-bear as tolerated.      Options for treatment and/or follow-up care were reviewed with the patient was actively involved in the decision making process. Patient verbalized understanding and was in agreement with the plan.    Rolan Morgan DO  , Sports Medicine  Department of Family Medicine and [facility]

## 2021-10-31 RX ORDER — GLYCERIN/POLYCARBOPHL/CARBOMER
2 GEL WITH PREFILLED APPLICATOR (GRAM) VAGINAL
Qty: 2 G | Refills: 3 | Status: SHIPPED | OUTPATIENT
Start: 2021-10-31 | End: 2022-11-02

## 2021-10-31 RX ORDER — METOPROLOL SUCCINATE 50 MG/1
50 TABLET, EXTENDED RELEASE ORAL DAILY
Qty: 90 TABLET | Refills: 3 | Status: SHIPPED | OUTPATIENT
Start: 2021-10-31 | End: 2022-12-15

## 2021-10-31 RX ORDER — ACETAMINOPHEN 160 MG
6000 TABLET,DISINTEGRATING ORAL DAILY
Qty: 270 CAPSULE | Refills: 3 | Status: SHIPPED | OUTPATIENT
Start: 2021-10-31 | End: 2022-12-02

## 2021-10-31 RX ORDER — TRIAMCINOLONE ACETONIDE 1 MG/G
OINTMENT TOPICAL 2 TIMES DAILY
Qty: 30 G | Refills: 11 | Status: SHIPPED | OUTPATIENT
Start: 2021-10-31 | End: 2022-08-10

## 2021-10-31 RX ORDER — GABAPENTIN 300 MG/1
CAPSULE ORAL
Qty: 540 CAPSULE | Refills: 3 | Status: SHIPPED | OUTPATIENT
Start: 2021-10-31 | End: 2022-04-19

## 2021-10-31 RX ORDER — TIZANIDINE 2 MG/1
2-4 TABLET ORAL 3 TIMES DAILY PRN
Qty: 90 TABLET | Refills: 1 | Status: ON HOLD | OUTPATIENT
Start: 2021-10-31 | End: 2021-11-22

## 2021-10-31 RX ORDER — TRIAMTERENE/HYDROCHLOROTHIAZID 37.5-25 MG
1 TABLET ORAL DAILY
Qty: 90 TABLET | Refills: 3 | Status: SHIPPED | OUTPATIENT
Start: 2021-10-31 | End: 2022-01-13

## 2021-10-31 RX ORDER — LEVOTHYROXINE SODIUM 112 UG/1
TABLET ORAL
Qty: 45 TABLET | Refills: 3 | Status: SHIPPED | OUTPATIENT
Start: 2021-10-31 | End: 2022-10-27

## 2021-10-31 RX ORDER — ATORVASTATIN CALCIUM 80 MG/1
80 TABLET, FILM COATED ORAL DAILY
Qty: 90 TABLET | Refills: 3 | Status: SHIPPED | OUTPATIENT
Start: 2021-10-31 | End: 2022-10-27

## 2021-10-31 RX ORDER — LISINOPRIL 40 MG/1
40 TABLET ORAL DAILY
Qty: 90 TABLET | Refills: 3 | Status: SHIPPED | OUTPATIENT
Start: 2021-10-31 | End: 2022-10-27

## 2021-10-31 RX ORDER — PRENATAL VIT 91/IRON/FOLIC/DHA 28-975-200
COMBINATION PACKAGE (EA) ORAL 2 TIMES DAILY
Qty: 24 G | Refills: 11 | Status: SHIPPED | OUTPATIENT
Start: 2021-10-31 | End: 2022-04-14

## 2021-10-31 RX ORDER — CYCLOBENZAPRINE HCL 10 MG
10 TABLET ORAL 3 TIMES DAILY PRN
Qty: 40 TABLET | Refills: 3 | Status: ON HOLD | OUTPATIENT
Start: 2021-10-31 | End: 2021-11-22

## 2021-10-31 RX ORDER — AMLODIPINE BESYLATE 10 MG/1
10 TABLET ORAL DAILY
Qty: 90 TABLET | Refills: 3 | Status: SHIPPED | OUTPATIENT
Start: 2021-10-31 | End: 2022-10-27

## 2021-11-01 NOTE — TELEPHONE ENCOUNTER
DIAGNOSIS: pre-op spine surgery consult   DATE RECEIVED: 11.10.21   NOTES STATUS DETAILS   OFFICE NOTE from referring provider Internal 10.15.21 YASHIRA Merino [facility]   OFFICE NOTE from other specialist Internal 10.15.21 YASHIRA Ramirez [facility]  10.11.21 YASHIRA Conley [facility]   OPERATIVE REPORT N/A    MEDICATION LIST Internal    PERTINENT LABS Internal    CTA (CT ANGIOGRAPHY) N/A    CT Internal 10.15.21 lumbar spine     MRI Internal 9.4.21 lumbar spine  12.20.19 lumbar spine   ULTRASOUND N/A

## 2021-11-02 ENCOUNTER — OFFICE VISIT (OUTPATIENT)
Dept: DERMATOLOGY | Facility: CLINIC | Age: 71
End: 2021-11-02
Payer: COMMERCIAL

## 2021-11-02 ENCOUNTER — MYC MEDICAL ADVICE (OUTPATIENT)
Dept: INTERNAL MEDICINE | Facility: CLINIC | Age: 71
End: 2021-11-02

## 2021-11-02 DIAGNOSIS — D18.01 CHERRY ANGIOMA: ICD-10-CM

## 2021-11-02 DIAGNOSIS — L81.4 SOLAR LENTIGO: ICD-10-CM

## 2021-11-02 DIAGNOSIS — Z13.828 SCREENING FOR RHEUMATOID ARTHRITIS: Primary | ICD-10-CM

## 2021-11-02 DIAGNOSIS — Z85.828 HISTORY OF NONMELANOMA SKIN CANCER: ICD-10-CM

## 2021-11-02 DIAGNOSIS — L82.0 INFLAMED SEBORRHEIC KERATOSIS: ICD-10-CM

## 2021-11-02 DIAGNOSIS — D22.9 MULTIPLE BENIGN MELANOCYTIC NEVI: Primary | ICD-10-CM

## 2021-11-02 PROCEDURE — 99213 OFFICE O/P EST LOW 20 MIN: CPT | Mod: 25 | Performed by: DERMATOLOGY

## 2021-11-02 PROCEDURE — 17110 DESTRUCTION B9 LES UP TO 14: CPT | Mod: GC | Performed by: DERMATOLOGY

## 2021-11-02 RX ORDER — POTASSIUM CHLORIDE 1500 MG/1
TABLET, EXTENDED RELEASE ORAL
Qty: 90 TABLET | Refills: 3 | Status: SHIPPED | OUTPATIENT
Start: 2021-11-02 | End: 2021-12-28

## 2021-11-02 ASSESSMENT — PAIN SCALES - GENERAL: PAINLEVEL: NO PAIN (0)

## 2021-11-02 NOTE — PATIENT INSTRUCTIONS
Cryotherapy Instructions     For the areas treated with liquid nitrogen (cryotherapy or freezing) today, they are expected to get pink, puffy, and perhaps even blister. The area should then crust up and fall off and the goal is to have nice new skin underneath. There is nothing special that you need to do for these areas. You can wash them as you do normal skin.     Sometimes a blister develops; if a blister does develop do NOT pop it. However, if it breaks open on its own, be sure to wash it with soap and water daily and put plain vasaline or petroleum jelly and a bandaid on it until the skin is healed.     Please call the clinic if you have any questions or concerns.

## 2021-11-02 NOTE — PROGRESS NOTES
[facility] Dermatology Note  Encounter Date: Nov 2, 2021  Office Visit     Dermatology Problem List:  FBSE 11/02/21  # Hx SCC of the perineum, tx w/ excision and radiation in 1980  # SK's. Multiple over chest back and face, and legs, s/p cryo  # Tinea pedis w/ onychomycosis   - Terbinafine cream  # Seborrheic dermatitis  - 2% ketoconazole shampoo, alternate with Head and Shoulders  # Hand dermatitis, triamcinolone 0.1% cream PRN  # Hx breast cancer  ____________________________________________    Assessment & Plan:    # Hx NMSC, no evidence of recurrent disease   - Regular skin checks advised    # Seborrheic keratoses, inflamed  - Treated with cryo, see procedure note below    # Benign lesions: Multiple benign nevi, solar lentigos, seborrheic keratoses, cherry angiomas. Explained to patient benign nature of lesion. No treatment is necessary at this time unless the lesion changes or becomes symptomatic.   - Sun precaution was advised including the use of sun screens of SPF 30 or higher, sun protective clothing, and avoidance of tanning beds.    Procedures Performed:   Cryotherapy procedure note  - location: left flank, groin, neck  - number of lesions treated: 6  After verbal consent and discussion of risks and benefits including but no limited to dyspigmentation/scar, blister, and pain, lesions treated with 1-2mm freeze border for 2 cycles with liquid nitrogen, post cryotherapy instructions provided    Follow-up: 1 year    Staff and Scribe:     Marielle Price MD  Dermatology Resident  [facility]    Scribe Disclosure:  I, Chad Gordon, am serving as a scribe to document services personally performed by Abe Wilkerson MD based on data collection and the provider's statements to me.     Staff attestation:  The documentation recorded by the scribe accurately reflects the services I personally performed and the decisions I personally made. I have made edits where needed.    Staff  Physician Comments:   I saw and evaluated the patient with the resident and I agree with the assessment and plan.  I was present for the entire minor procedure and examination. I have made edits if needed.    Abe Wilkerson MD  Staff Dermatologist and Dermatopathologist  , Department of Dermatology    ____________________________________________    CC: Skin Check (pt states she is here for a full body skin check, spot on lower back on and neck )    HPI:  Ms. Nadira Perez is a(n) 71 year old female who presents today as a return patient for FBSE. Last seen by me on 11/3/2020, at which time patient received cryo for treatment of inflamed seborrheic keratoses.    Today, patient reports that she has spots on her lower back and on her neck that she would like examined. Patient reports history of tanning bed use. She now tries to avoid sun exposure.  - has brown spots on the groin area, sometimes irritated but not bothersome    Patient is otherwise feeling well, without additional skin concerns.    Labs Reviewed:  N/A    Physical Exam:  SKIN: full body skin exam  - Scattered brown macules on sun exposed areas..   - There are waxy stuck on tan to brown papules on the trunk and extremities.   - There are dome shaped bright red papules on the trunk and extremities.   - Telangiectatic patches on chest  - Multiple regular brown pigmented macules and papules are identified on the trunk and extremities.    Medications:  Current Outpatient Medications   Medication     Acetaminophen (APAP 500 PO)     amLODIPine (NORVASC) 10 MG tablet     anastrozole (ARIMIDEX) 1 MG tablet     Ascorbic Acid (VITAMIN C) 500 MG CHEW     atorvastatin (LIPITOR) 80 MG tablet     Cholecalciferol (VITAMIN D3) 50 MCG (2000 UT) CAPS     CRANBERRY EXTRACT PO     cyclobenzaprine (FLEXERIL) 10 MG tablet     diclofenac (VOLTAREN) 1 % topical gel     gabapentin (NEURONTIN) 300 MG capsule     HYDROcodone-acetaminophen (NORCO) 5-325 MG  tablet     ibuprofen (ADVIL/MOTRIN) 200 MG tablet     levothyroxine (SYNTHROID/LEVOTHROID) 112 MCG tablet     lisinopril (ZESTRIL) 40 MG tablet     melatonin 5 MG tablet     metoprolol succinate ER (TOPROL-XL) 50 MG 24 hr tablet     Multiple Vitamin (MULTI-VITAMIN) per tablet     ondansetron (ZOFRAN-ODT) 4 MG ODT tab     potassium chloride ER (KLOR-CON M) 20 MEQ CR tablet     terbinafine (LAMISIL AT) 1 % external cream     tiZANidine (ZANAFLEX) 2 MG tablet     triamcinolone (KENALOG) 0.1 % external ointment     triamterene-HCTZ (MAXZIDE-25) 37.5-25 MG tablet     Vaginal Lubricant (REPHRESH) GEL     Current Facility-Administered Medications   Medication     dexamethasone (DECADRON) injection 4 mg     lidocaine (PF) (XYLOCAINE) 1 % injection 2 mL     lidocaine (PF) (XYLOCAINE) 1 % injection 2 mL     lidocaine (PF) (XYLOCAINE) 1 % injection 2 mL     lidocaine 1 % injection 2 mL     lidocaine 1 % injection 2 mL     lidocaine 1 % injection 2 mL     lidocaine 1 % injection 2 mL     triamcinolone (KENALOG-40) injection 40 mg     triamcinolone (KENALOG-40) injection 40 mg     triamcinolone (KENALOG-40) injection 40 mg     triamcinolone (KENALOG-40) injection 40 mg     triamcinolone (KENALOG-40) injection 40 mg     triamcinolone (KENALOG-40) injection 40 mg      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     Corneal opacity     Hypothyroidism     Osteopenia, unspecified location     Aromatase inhibitor  use     Chronic pain of right knee     DDD (degenerative disc disease), lumbar     Degenerative scoliosis in adult patient     Spondylosis without myelopathy or radiculopathy, lumbosacral region     Right carpal tunnel syndrome     Peripheral neuropathy     Past Medical History:   Diagnosis Date     Arthritis      Bone disease      Breast cancer (H)      H/O kyphoplasty      Hearing problem      History of kidney stones      History of radiation therapy      Hyperlipemia      Hypertension      Hypopotassemia      Kidney problem      Lymph edema      Medullary sponge kidney      Osteopenia      PONV (postoperative nausea and vomiting)      Reduced vision      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 on close of this encounter.

## 2021-11-02 NOTE — LETTER
11/2/2021     RE: Nadira Perez  [id number]  [facility] [address]     Dear Colleague,    Thank you for referring your patient, Nadira Perez, to the [facility] DERMATOLOGY [facility] [place] [facility]. Please see a copy of my visit note below.    [facility] Dermatology Note  Encounter Date: Nov 2, 2021  Office Visit     Dermatology Problem List:  FBSE 11/02/21  # Hx SCC of the perineum, tx w/ excision and radiation in 1980  # SK's. Multiple over chest back and face, and legs, s/p cryo  # Tinea pedis w/ onychomycosis   - Terbinafine cream  # Seborrheic dermatitis  - 2% ketoconazole shampoo, alternate with Head and Shoulders  # Hand dermatitis, triamcinolone 0.1% cream PRN  # Hx breast cancer  ____________________________________________    Assessment & Plan:    # Hx NMSC, no evidence of recurrent disease   - Regular skin checks advised    # Seborrheic keratoses, inflamed  - Treated with cryo, see procedure note below    # Benign lesions: Multiple benign nevi, solar lentigos, seborrheic keratoses, cherry angiomas. Explained to patient benign nature of lesion. No treatment is necessary at this time unless the lesion changes or becomes symptomatic.   - Sun precaution was advised including the use of sun screens of SPF 30 or higher, sun protective clothing, and avoidance of tanning beds.    Procedures Performed:   Cryotherapy procedure note  - location: left flank, groin, neck  - number of lesions treated: 6  After verbal consent and discussion of risks and benefits including but no limited to dyspigmentation/scar, blister, and pain, lesions treated with 1-2mm freeze border for 2 cycles with liquid nitrogen, post cryotherapy instructions provided    Follow-up: 1 year    Staff and Scribe:     Marielle Price MD  Dermatology Resident  [facility]    Scribe Disclosure:  Chad GARRETT, am serving as a scribe to  document services personally performed by Abe Wilkerson MD based on data collection and the provider's statements to me.     Staff attestation:  The documentation recorded by the scribe accurately reflects the services I personally performed and the decisions I personally made. I have made edits where needed.    Staff Physician Comments:   I saw and evaluated the patient with the resident and I agree with the assessment and plan.  I was present for the entire minor procedure and examination. I have made edits if needed.    Abe Wilkerson MD  Staff Dermatologist and Dermatopathologist  , Department of Dermatology    ____________________________________________    CC: Skin Check (pt states she is here for a full body skin check, spot on lower back on and neck )    HPI:  Ms. Nadira Perez is a(n) 71 year old female who presents today as a return patient for FBSE. Last seen by me on 11/3/2020, at which time patient received cryo for treatment of inflamed seborrheic keratoses.    Today, patient reports that she has spots on her lower back and on her neck that she would like examined. Patient reports history of tanning bed use. She now tries to avoid sun exposure.  - has brown spots on the groin area, sometimes irritated but not bothersome    Patient is otherwise feeling well, without additional skin concerns.    Labs Reviewed:  N/A    Physical Exam:  SKIN: full body skin exam  - Scattered brown macules on sun exposed areas..   - There are waxy stuck on tan to brown papules on the trunk and extremities.   - There are dome shaped bright red papules on the trunk and extremities.   - Telangiectatic patches on chest  - Multiple regular brown pigmented macules and papules are identified on the trunk and extremities.    Medications:  Current Outpatient Medications   Medication     Acetaminophen (APAP 500 PO)     amLODIPine (NORVASC) 10 MG tablet     anastrozole (ARIMIDEX) 1 MG tablet     Ascorbic  Acid (VITAMIN C) 500 MG CHEW     atorvastatin (LIPITOR) 80 MG tablet     Cholecalciferol (VITAMIN D3) 50 MCG (2000 UT) CAPS     CRANBERRY EXTRACT PO     cyclobenzaprine (FLEXERIL) 10 MG tablet     diclofenac (VOLTAREN) 1 % topical gel     gabapentin (NEURONTIN) 300 MG capsule     HYDROcodone-acetaminophen (NORCO) 5-325 MG tablet     ibuprofen (ADVIL/MOTRIN) 200 MG tablet     levothyroxine (SYNTHROID/LEVOTHROID) 112 MCG tablet     lisinopril (ZESTRIL) 40 MG tablet     melatonin 5 MG tablet     metoprolol succinate ER (TOPROL-XL) 50 MG 24 hr tablet     Multiple Vitamin (MULTI-VITAMIN) per tablet     ondansetron (ZOFRAN-ODT) 4 MG ODT tab     potassium chloride ER (KLOR-CON M) 20 MEQ CR tablet     terbinafine (LAMISIL AT) 1 % external cream     tiZANidine (ZANAFLEX) 2 MG tablet     triamcinolone (KENALOG) 0.1 % external ointment     triamterene-HCTZ (MAXZIDE-25) 37.5-25 MG tablet     Vaginal Lubricant (REPHRESH) GEL     Current Facility-Administered Medications   Medication     dexamethasone (DECADRON) injection 4 mg     lidocaine (PF) (XYLOCAINE) 1 % injection 2 mL     lidocaine (PF) (XYLOCAINE) 1 % injection 2 mL     lidocaine (PF) (XYLOCAINE) 1 % injection 2 mL     lidocaine 1 % injection 2 mL     lidocaine 1 % injection 2 mL     lidocaine 1 % injection 2 mL     lidocaine 1 % injection 2 mL     triamcinolone (KENALOG-40) injection 40 mg     triamcinolone (KENALOG-40) injection 40 mg     triamcinolone (KENALOG-40) injection 40 mg     triamcinolone (KENALOG-40) injection 40 mg     triamcinolone (KENALOG-40) injection 40 mg     triamcinolone (KENALOG-40) injection 40 mg      Past Medical History:   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     Hypopotassemia     Hyperlipidemia     Murmurs     Nephrolithiasis     Osteoarthrosis, hand     Personal history of other malignant neoplasm of skin     Inflamed seborrheic keratosis     Essential  hypertension, benign     Osteoporosis     Mechanical problems with limbs     Hypovitaminosis D     Contact dermatitis and other eczema, due to unspecified cause     Dermatitis     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     Corneal opacity     Hypothyroidism     Osteopenia, unspecified location     Aromatase inhibitor use     Chronic pain of right knee     DDD (degenerative disc disease), lumbar     Degenerative scoliosis in adult patient     Spondylosis without myelopathy or radiculopathy, lumbosacral region     Right carpal tunnel syndrome     Peripheral neuropathy     Past Medical History:   Diagnosis Date     Arthritis      Bone disease      Breast cancer (H)      H/O kyphoplasty      Hearing problem      History of kidney stones      History of radiation therapy      Hyperlipemia      Hypertension      Hypopotassemia      Kidney problem      Lymph edema      Medullary sponge kidney      Osteopenia      PONV (postoperative nausea and vomiting)      Reduced vision      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     Thyroid disease    CC No referring provider defined for this encounter. on close of this encounter.

## 2021-11-03 DIAGNOSIS — G56.01 RIGHT CARPAL TUNNEL SYNDROME: Primary | ICD-10-CM

## 2021-11-03 NOTE — PROGRESS NOTES
Preoperative Assessment Center Medication History Note    Medication history completed on November 3, 2021 by this writer. See Epic admission navigator for prior to admission medications. Operating room staff will still need to confirm medications and last dose information on day of surgery.     Medication history interview sources  Patient interview: Yes  Care Everywhere records: No  Surescripts pharmacy refill records: Yes  Other (if applicable):     Changes made to PTA medication list  Added: none  Deleted: decadron inj (complete), lidocaine x7 (completed), kenalog x6 (completed),   Changed: acetaminophen, diclofenac gel, gabapentin dose/sig, lisinopril dose/sig, terbenafine sig, tizanidine directions.     Additional medication history information (including reliability of information, actions taken by pharmacist):    -- No recent (within 30 days) course of antibiotics  -- No recent (within 30 days) course of systemic steroids -   -- Patient declines being on any other prescription or over-the-counter medications    Prior to Admission medications    Medication Sig Last Dose Taking? Auth Provider   Acetaminophen (APAP 500 PO) Take 1,000 mg by mouth 3 times daily  Taking Yes Reported, Patient   amLODIPine (NORVASC) 10 MG tablet Take 1 tablet (10 mg) by mouth daily  Patient taking differently: Take 10 mg by mouth every 10 days Takes around 10 AM. Taking Yes Matilde Quiñonez APRN CNP   anastrozole (ARIMIDEX) 1 MG tablet TAKE 1 TABLET BY MOUTH EVERY DAY  Patient taking differently: Take 1 mg by mouth daily  Taking Yes Khushbu Louise MD   Ascorbic Acid (VITAMIN C) 500 MG CHEW Take 1 tablet by mouth daily Taking Yes Reported, Patient   atorvastatin (LIPITOR) 80 MG tablet Take 1 tablet (80 mg) by mouth daily  Patient taking differently: Take 80 mg by mouth every evening  Taking Yes Matilde Quiñonez APRN CNP   Cholecalciferol (VITAMIN D3) 50 MCG (2000 UT) CAPS Take 6,000 Units by mouth daily  Patient taking  differently: Take 6,000 Units by mouth daily (with lunch)  Taking Yes Matilde Quiñonez APRN CNP   CRANBERRY EXTRACT PO Take 650 mg by mouth daily ~10 am Taking Yes Reported, Patient   cyclobenzaprine (FLEXERIL) 10 MG tablet Take 1 tablet (10 mg) by mouth 3 times daily as needed for muscle spasms Taking Yes Matilde Quiñonez APRN CNP   diclofenac (VOLTAREN) 1 % topical gel Apply 4 grams to knees or 2 grams to hands four times daily using enclosed dosing card.  Patient taking differently: Apply 4 grams to knees or 2 grams to hands four times daily as needed using enclosed dosing card. Taking Yes Matilde Quiñonez APRN CNP   gabapentin (NEURONTIN) 300 MG capsule Take 1-2 capsules by mouth every 8 hours  Patient taking differently: Take 600 mg by mouth 3 times daily  Taking Yes Matilde Quiñonez APRN CNP   HYDROcodone-acetaminophen (NORCO) 5-325 MG tablet Take 1 tablet by mouth every 6 hours as needed for severe pain Taking Yes Matilde Quiñonez APRN CNP   ibuprofen (ADVIL/MOTRIN) 200 MG tablet Take 600 mg by mouth 3 times daily  Taking Yes Reported, Patient   levothyroxine (SYNTHROID/LEVOTHROID) 112 MCG tablet TAKE 1/2 TABLET BY MOUTH DAILY Taking Yes Matilde Quiñonez APRN CNP   lisinopril (ZESTRIL) 40 MG tablet Take 1 tablet (40 mg) by mouth daily  Patient taking differently: Take 20 mg by mouth 2 times daily Takes 1/2 tablet at 2 PM and the other 1/2 tablet around 10 PM Taking Yes Matilde Quiñonez APRN CNP   melatonin 5 MG tablet Take 10 mg by mouth At Bedtime  Taking Yes Reported, Patient   metoprolol succinate ER (TOPROL-XL) 50 MG 24 hr tablet Take 1 tablet (50 mg) by mouth daily  Patient taking differently: Take 50 mg by mouth every morning  Taking Yes Matilde Quiñonez APRN CNP   Multiple Vitamin (MULTI-VITAMIN) per tablet Take 1 tablet by mouth daily (with lunch)  Taking Yes Reported, Patient   ondansetron (ZOFRAN-ODT) 4 MG ODT tab Take 1 tablet (4 mg) by mouth every 12 hours as needed for  nausea Taking Yes Matilde Quiñonez APRN CNP   potassium chloride ER (KLOR-CON M) 20 MEQ CR tablet Take 20 meq three times daily.  Patient taking differently: Take 20 mEq by mouth 3 times daily Take 20 meq three times daily. Taking Yes Matilde Quiñonez APRN CNP   terbinafine (LAMISIL AT) 1 % external cream Apply topically 2 times daily For fungal infection not resolved with other antifungals (e.g. Clotrimazole)  Patient taking differently: Apply topically 2 times daily as needed (toenail fungus) For fungal infection not resolved with other antifungals (e.g. Clotrimazole) Taking Yes Matilde Quiñonez APRN CNP   tiZANidine (ZANAFLEX) 2 MG tablet Take 1-2 tablets (2-4 mg) by mouth 3 times daily as needed for muscle spasms  Patient taking differently: Take 2-4 mg by mouth 3 times daily as needed for muscle spasms Uses this or flexeril - prefers tizanidine. Taking Yes Matilde Quiñonez APRN CNP   triamcinolone (KENALOG) 0.1 % external ointment Apply topically 2 times daily  Patient taking differently: Apply topically 2 times daily as needed  Taking Yes Matilde Quiñonez APRN CNP   triamterene-HCTZ (MAXZIDE-25) 37.5-25 MG tablet Take 1 tablet by mouth daily Taking Yes Matilde Quiñonez APRN CNP   Vaginal Lubricant (REPHRESH) GEL Place 2 g vaginally every 3 days Taking Yes Matilde Quiñonez APRN CNP     Medication history completed by: Denny Lebron [facility]

## 2021-11-04 ENCOUNTER — THERAPY VISIT (OUTPATIENT)
Dept: PHYSICAL THERAPY | Facility: CLINIC | Age: 71
End: 2021-11-04
Payer: COMMERCIAL

## 2021-11-04 ENCOUNTER — ANESTHESIA EVENT (OUTPATIENT)
Dept: SURGERY | Facility: CLINIC | Age: 71
DRG: 454 | End: 2021-11-04
Payer: COMMERCIAL

## 2021-11-04 ENCOUNTER — OFFICE VISIT (OUTPATIENT)
Dept: SURGERY | Facility: CLINIC | Age: 71
End: 2021-11-04
Payer: COMMERCIAL

## 2021-11-04 ENCOUNTER — PRE VISIT (OUTPATIENT)
Dept: SURGERY | Facility: CLINIC | Age: 71
End: 2021-11-04

## 2021-11-04 VITALS
WEIGHT: 173.3 LBS | HEART RATE: 70 BPM | HEIGHT: 67 IN | TEMPERATURE: 98.8 F | SYSTOLIC BLOOD PRESSURE: 146 MMHG | OXYGEN SATURATION: 99 % | BODY MASS INDEX: 27.2 KG/M2 | DIASTOLIC BLOOD PRESSURE: 94 MMHG | RESPIRATION RATE: 16 BRPM

## 2021-11-04 DIAGNOSIS — D64.9 ANEMIA, UNSPECIFIED TYPE: ICD-10-CM

## 2021-11-04 DIAGNOSIS — Z01.818 PRE-OP EXAMINATION: Primary | ICD-10-CM

## 2021-11-04 DIAGNOSIS — M41.50 DEGENERATIVE SCOLIOSIS IN ADULT PATIENT: ICD-10-CM

## 2021-11-04 DIAGNOSIS — M47.817 SPONDYLOSIS WITHOUT MYELOPATHY OR RADICULOPATHY, LUMBOSACRAL REGION: ICD-10-CM

## 2021-11-04 PROCEDURE — 93000 ELECTROCARDIOGRAM COMPLETE: CPT | Performed by: INTERNAL MEDICINE

## 2021-11-04 PROCEDURE — 97110 THERAPEUTIC EXERCISES: CPT | Mod: GP | Performed by: PHYSICAL THERAPY ASSISTANT

## 2021-11-04 PROCEDURE — 99204 OFFICE O/P NEW MOD 45 MIN: CPT | Mod: 24 | Performed by: PHYSICIAN ASSISTANT

## 2021-11-04 ASSESSMENT — LIFESTYLE VARIABLES: TOBACCO_USE: 0

## 2021-11-04 ASSESSMENT — MIFFLIN-ST. JEOR
SCORE: 1346.89
SCORE: 1343.71

## 2021-11-04 ASSESSMENT — ENCOUNTER SYMPTOMS
SEIZURES: 0
DYSRHYTHMIAS: 1

## 2021-11-04 ASSESSMENT — PAIN SCALES - GENERAL: PAINLEVEL: SEVERE PAIN (6)

## 2021-11-04 NOTE — ANESTHESIA PREPROCEDURE EVALUATION
Anesthesia Pre-Procedure Evaluation    Patient: Nadira Perez   MRN: [id number] : 1950        Preoperative Diagnosis: Degenerative scoliosis in adult patient [M41.80]  Spondylolisthesis of lumbar region [M43.16]  Spinal stenosis of lumbar region with neurogenic claudication [M48.062]    Procedure : Procedure(s):  Part 1: Oblique Anterior Interbody Fusion at Lumbar 5 to sacral 1 with use of Bone Morphogenic Protein,  Part 2 Same Day Prone Positioning: Open Posterior Instrumented Spinal Fusion at Lumbar 5 to Sacral 1, with grimm aguayo osteotomy, use of O-Arm/Stealth  PAC EVALUATION       Past Medical History:   Diagnosis Date     Arthritis      Bone disease      Breast cancer (H)      H/O kyphoplasty      Hearing problem      History of kidney stones      History of radiation therapy      Hyperlipemia      Hypertension      Hypopotassemia      Kidney problem      Lymph edema      Medullary sponge kidney      Osteopenia      PONV (postoperative nausea and vomiting)      Reduced vision      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     Thyroid disease       Past Surgical History:   Procedure Laterality Date     ABDOMEN SURGERY      ovarian cyst, mesh     ARTHRODESIS WRIST Right      ARTHRODESIS WRIST  2013    Procedure: ARTHRODESIS WRIST;  left wrist scaphoid excision, four bone fusion, iliac crest bone graft  ( Mac with block);  Surgeon: Av Mendez MD;  [facility]: [facility]     BIOPSY      skin, vaginal     CATARACT IOL, RT/LT Right 2018     CATARACT IOL, RT/LT Left 2018     COLONOSCOPY  2013    Procedure: COMBINED COLONOSCOPY, SINGLE BIOPSY/POLYPECTOMY BY BIOPSY;  COLONOSCOPY;  Surgeon: Dom Alvarez MD;  Location:  GI     ESOPHAGOSCOPY, GASTROSCOPY, DUODENOSCOPY (EGD), COMBINED N/A 2016    Procedure: COMBINED ESOPHAGOSCOPY, GASTROSCOPY, DUODENOSCOPY (EGD);  Surgeon: Quinten Feliciano MD;  Location:  GI     EXTERNAL EAR SURGERY      right     EYE  SURGERY      radial keratomy     GRAFT BONE FROM ILIAC CREST  02/14/2013    Procedure: GRAFT BONE FROM ILIAC CREST;  mac with block and local infilitration;  Surgeon: Av Mendez MD;  [facility]: [facility]      BREATH HYDROGEN TEST N/A 10/14/2016    Procedure: HYDROGEN BREATH TEST;  Surgeon: Cheri Braron MD;  Location:  GI     HERNIA REPAIR      umbilical age 18 mos.     HYSTERECTOMY TOTAL ABDOMINAL  05/03/2000     MASTECTOMY MODIFIED RADICAL Bilateral     bilateral; right breast prophylactic     PARATHYROIDECTOMY  09/23/2004    R SUPERIOR     PARATHYROIDECTOMY  09/23/2004    parathyroid resection, subtotal     REMOVE HARDWARE HAND  09/24/2013    Procedure: REMOVE HARDWARE HAND;  Left Hand Screw Removal        RHINOPLASTY  1968     thyr proc skin closed cosmetic manner by subcuticular stitch  01/23/2009     THYROPLASTY  10/09/2009     TONSILLECTOMY  1977     WRIST SURGERY      wrist arthrodesis      Allergies   Allergen Reactions     Penicillins Hives     Around age 6 - doesn't recall full reaction, mother told her it was hives.     Has tolerated cephalsporins.       Social History     Tobacco Use     Smoking status: Never Smoker     Smokeless tobacco: Never Used   Substance Use Topics     Alcohol use: Not Currently     Alcohol/week: 7.0 standard drinks     Types: 7 Glasses of wine per week     Comment: Rare to occasional      Wt Readings from Last 1 Encounters:   11/04/21 78.9 kg (174 lb)        Anesthesia Evaluation   Pt has had prior anesthetic. Type: General.    History of anesthetic complications  - PONV.      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 She has dysphagia from this as well.     Hearing aids    (+) LOLY risk factors, snores loudly,  (-) tobacco use   Neurologic:     (+) peripheral neuropathy, - feet, hands.  (-) no seizures, no CVA and no TIA    Cardiovascular: Comment: Patient is s/p left axillary lymph node dissection and has lymphedema. She reports cannot have BP or IV on left arm.     (+) Dyslipidemia hypertension-----dysrhythmias, PVCs, Previous cardiac testing   Echo: Date: 2019 Results:  Interpretation Summary  Left ventricular function, chamber size, wall motion, and wall thickness are  normal.The EF is 60-65%.  Right ventricular function, chamber size, wall motion, and thickness are  normal.  No significant valvular abnormalities were noted.     Compared to the previous study from 6/29/2009, there has been no significant  change. PVCs observed during the study.    Stress Test: Date: Results:    ECG Reviewed: Date: 11/4/21 Results:  Sinus rhythm, LVH  Cath: Date: Results:      METS/Exercise Tolerance: 4 - Raking leaves, gardening    Hematologic:     (+) anemia,     Musculoskeletal: Comment: DDD  scoliosis  CTS on left   Knee pain    Osteoporosis    Patient uses a walker for balance    Possible fracture of right 2nd toe          GI/Hepatic:  - neg GI/hepatic ROS  (-) GERD   Renal/Genitourinary:       Endo: Comment: Impaired fasting glucose    (+) thyroid problem, hypothyroidism,     Psychiatric/Substance Use:  - neg psychiatric ROS     Infectious Disease:  - neg infectious disease ROS     Malignancy:   (+) Malignancy, History of Breast and Skin.Breast CA Remission status post Surgery, Chemo and Radiation.  Skin CA Remission status post Surgery.        Other: Comment: dermatitis           Physical Exam    Airway        Mallampati: I   TM distance: > 3 FB   Neck ROM: full   Mouth opening: > 3 cm    Respiratory Devices and Support         Dental  no notable dental history         Cardiovascular       Comment: Small murmur     Rhythm and rate: regular and normal   (+) murmur       Pulmonary   pulmonary exam normal                OUTSIDE LABS:  CBC:   Lab Results   Component Value Date    WBC 7.9 05/07/2021    WBC 7.8 05/12/2020    HGB 11.5 (L)  05/07/2021    HGB 12.3 05/12/2020    HCT 36.8 05/07/2021    HCT 39.2 05/12/2020     05/07/2021     05/12/2020     BMP:   Lab Results   Component Value Date     05/07/2021     05/07/2021    POTASSIUM 3.1 (L) 09/28/2021    POTASSIUM 3.2 (L) 08/26/2021    CHLORIDE 105 05/07/2021    CHLORIDE 107 05/07/2021    CO2 31 05/07/2021    CO2 30 05/07/2021    BUN 21 05/07/2021    BUN 22 05/07/2021    CR 0.73 05/07/2021    CR 0.74 05/07/2021    GLC 99 05/07/2021    GLC 98 05/07/2021     COAGS:   Lab Results   Component Value Date    PTT 25 08/20/2010    INR 0.85 (L) 08/20/2010     POC:   Lab Results   Component Value Date    BGM 98 01/16/2008     HEPATIC:   Lab Results   Component Value Date    ALBUMIN 4.2 05/07/2021    PROTTOTAL 7.2 05/07/2021    ALT 25 05/07/2021    AST 18 05/07/2021    ALKPHOS 70 05/07/2021    BILITOTAL 0.5 05/07/2021     OTHER:   Lab Results   Component Value Date    A1C 5.8 (H) 08/26/2021    RAMBO 9.6 05/07/2021    RAMBO 9.5 05/07/2021    PHOS 3.0 11/21/2017    MAG 2.2 06/11/2009    LIPASE 177 09/15/2016    TSH 1.95 08/26/2021    T4 1.05 11/21/2011    CRP <2.9 09/15/2016    SED 6 05/12/2010       Anesthesia Plan    ASA Status:  3   NPO Status:  NPO Appropriate    Anesthesia Type: General.     - Airway: ETT   Induction: Intravenous.   Maintenance: Balanced.   Techniques and Equipment:     - Lines/Monitors: 2nd IV, Arterial Line     Consents    Anesthesia Plan(s) and associated risks, benefits, and realistic alternatives discussed. Questions answered and patient/representative(s) expressed understanding.    - Discussed:     - Discussed with:  Patient      - Extended Intubation/Ventilatory Support Discussed: No.      - Patient is DNR/DNI Status: No    Use of blood products discussed: Yes.     - Discussed with: Patient.     Postoperative Care    Pain management: IV analgesics, Multi-modal analgesia.   PONV prophylaxis: Ondansetron (or other 5HT-3), Dexamethasone or Solumedrol, Scopolamine  patch     Comments:              PAC Discussion and Assessment    ASA Classification: 3  Case is suitable for: [facility] [place]  Anesthetic techniques and relevant risks discussed: GA                  PAC Resident/NP Anesthesia Assessment: Nadira is a 71 year old woman who is scheduled for Part 1: Oblique Anterior Interbody Fusion at Lumbar 5 to sacral 1 with use of Bone Morphogenic Protein, Part 2 Same Day Prone Positioning: Open Posterior Instrumented Spinal Fusion at Lumbar 5 to Sacral 1, with grimm aguayo osteotomy, use of O-Arm/Stealth on 11/18/21 by Dr. Ramirez in treatment of Degenerative scoliosis in adult patient.  PAC referral for risk assessment and optimization for anesthesia with comorbid conditions of hypertension, hyperlipidemia, neuropathy, prediabetes, hypothyroidism, history of nephrolithiasis, history of breast cancer, dermatitis, history of squamous cell carcinoma, degenerative disc disease, scoliosis, carpal tunnel syndrome, knee pain and osteoporosis:      Pre-operative considerations:   1.  Cardiac:  Functional status- METS 4, the patient is doing PT and only uses a walker for balance. She denies any cardiac symptoms of chest pain, tightness or shortness of breath. She does have PVCs and reports sometimes will feel this. EKG completed today shows normal sinus rhythm with LVH with QRS widening. Intermediate risk surgery with 0.4% (RCRI #) risk of major adverse cardiac event.    ~ HTN - continue amlodipine, metoprolol and hold maxizide and lisinopril DOS.   ~ HLD - continue Lipitor.     2.  Pulm:  Airway feasible.  LOLY risk: Low (age, HTN)    3. Heme:   Anemia - hgb was 11.5 on 5/7/21. Will get iron studies and type and screen tomorrow with her already scheduled lab draw.   ~ The patient reports her IV and blood pressure need to be done on her right side given her left arm lymphedema.     4. Neuro:  Neuropathy in feet and hands from CTS - continue Neurontin  ~ Patient uses rolling walker for  balance. Fall precautions indicated.     5. Endo: hypothyroidism - continue levothyroxine.   ~ Pre-diabetes - A1c was 5.8 on 8/26/21.     6. GI:  Risk of PONV score = 3.  If > 2, anti-emetic intervention recommended.     7. :  history of nephrolithiasis - no current concerns  ~ History of breast cancer s/p bilateral mastectomy, radiation and chemotherapy.     8. Skin: Dermatitis - continue topical cream.   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     9. Musculoskeletal:  Degenerative scoliosis in adult patient - procedure as above  ~ carpal tunnel syndrome, knee pain, osteoporosis and possible 2nd toe fracture - consideration for careful positioning to minimize discomfort. Continue Flexeril.     10. 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 She has some dysphagia issues. She met with Dr. Villalobos to discuss her intubation concerns today.   ~ Bilateral hearing loss with hearing aids.     VTE risk: 0.5%     Patient is optimized and is acceptable candidate for the proposed procedure.  No further diagnostic evaluation is needed.    Patient discussed with Dr. Villalobos  For further details of assessment, testing, and physical exam please see H and P completed on same date   Loida Mendoza PA-C          Mid-Level Provider/Resident: Loida Mendoza PA-C  Date: 11/4/21      Attending Anesthesiologist Anesthesia Assessment: Patient with concerns regarding endotracheal intubation after vocal cord paralysis following thyroid surgery 10+ years ago.  Strong voice.  No symptoms of stridor or respiratory distress during exertion.    Will benefit from smaller endotracheal tube for upcoming surgery.    Anesthesiologist: Wilfredo  Date: 4 Nov 2021  Time: 1324  Pass/Fail: Pass                 Loida Mendoza PA-C

## 2021-11-04 NOTE — H&P (VIEW-ONLY)
Pre-Operative H & P     CC:  Preoperative exam to assess for increased cardiopulmonary risk while undergoing surgery and anesthesia.    Date of Encounter: 11/4/2021  Primary Care Physician:  Matilde Quiñonez     Reason for visit:   Encounter Diagnoses   Name Primary?     Pre-op examination Yes     Degenerative scoliosis in adult patient      Anemia, unspecified type        HPI  Nadira Perez is a 71 year old female who presents for pre-operative H & P in preparation for Part 1: Oblique Anterior Interbody Fusion at Lumbar 5 to sacral 1 with use of Bone Morphogenic Protein, Part 2 Same Day Prone Positioning: Open Posterior Instrumented Spinal Fusion at Lumbar 5 to Sacral 1, with grimm aguayo osteotomy, use of O-Arm/Stealth with Dr. Ramirez on 11/18/21 at [facility].     The patient is a 71-year-old woman with a past medical history significant for hypertension, hyperlipidemia, neuropathy, prediabetes, hypothyroidism, history of nephrolithiasis, history of breast cancer, dermatitis, history of squamous cell carcinoma, degenerative disc disease, scoliosis, carpal tunnel syndrome, knee pain and osteoporosis.  The patient met with Dr. Ramirez initially on 1/28/2020 for worsening chronic back pain.  At that visit they discussed her treatment options and she was not interested in pursuing surgery at this time. She then followed up again with Dr. Ramirez on 10/15/2021 as she began to have more back spasms with worsening symptoms with claudication.  They again discussed her treatment options and the patient is now scheduled for the procedure as above.    History is obtained from the patient and chart review      Hx of abnormal bleeding or anti-platelet use: none    Menstrual history: No LMP recorded. Patient has had a hysterectomy.:     Prior to Admission Medications  Current Outpatient Medications   Medication Sig Dispense Refill     Acetaminophen (APAP 500 PO) Take 1,000 mg  by mouth 3 times daily        amLODIPine (NORVASC) 10 MG tablet Take 1 tablet (10 mg) by mouth daily (Patient taking differently: Take 10 mg by mouth every 10 days Takes around 10 AM.) 90 tablet 3     anastrozole (ARIMIDEX) 1 MG tablet TAKE 1 TABLET BY MOUTH EVERY DAY (Patient taking differently: Take 1 mg by mouth daily ) 90 tablet 3     Ascorbic Acid (VITAMIN C) 500 MG CHEW Take 1 tablet by mouth daily       atorvastatin (LIPITOR) 80 MG tablet Take 1 tablet (80 mg) by mouth daily (Patient taking differently: Take 80 mg by mouth every evening ) 90 tablet 3     Cholecalciferol (VITAMIN D3) 50 MCG (2000 UT) CAPS Take 6,000 Units by mouth daily (Patient taking differently: Take 6,000 Units by mouth daily (with lunch) ) 270 capsule 3     CRANBERRY EXTRACT PO Take 650 mg by mouth daily ~10 am       cyclobenzaprine (FLEXERIL) 10 MG tablet Take 1 tablet (10 mg) by mouth 3 times daily as needed for muscle spasms 40 tablet 3     diclofenac (VOLTAREN) 1 % topical gel Apply 4 grams to knees or 2 grams to hands four times daily using enclosed dosing card. (Patient taking differently: Apply 4 grams to knees or 2 grams to hands four times daily as needed using enclosed dosing card.) 100 g 1     gabapentin (NEURONTIN) 300 MG capsule Take 1-2 capsules by mouth every 8 hours (Patient taking differently: Take 600 mg by mouth 3 times daily ) 540 capsule 3     HYDROcodone-acetaminophen (NORCO) 5-325 MG tablet Take 1 tablet by mouth every 6 hours as needed for severe pain 40 tablet 0     ibuprofen (ADVIL/MOTRIN) 200 MG tablet Take 600 mg by mouth 3 times daily        levothyroxine (SYNTHROID/LEVOTHROID) 112 MCG tablet TAKE 1/2 TABLET BY MOUTH DAILY 45 tablet 3     lisinopril (ZESTRIL) 40 MG tablet Take 1 tablet (40 mg) by mouth daily (Patient taking differently: Take 20 mg by mouth 2 times daily Takes 1/2 tablet at 2 PM and the other 1/2 tablet around 10 PM) 90 tablet 3     melatonin 5 MG tablet Take 10 mg by mouth At Bedtime         metoprolol succinate ER (TOPROL-XL) 50 MG 24 hr tablet Take 1 tablet (50 mg) by mouth daily (Patient taking differently: Take 50 mg by mouth every morning ) 90 tablet 3     Multiple Vitamin (MULTI-VITAMIN) per tablet Take 1 tablet by mouth daily (with lunch)        ondansetron (ZOFRAN-ODT) 4 MG ODT tab Take 1 tablet (4 mg) by mouth every 12 hours as needed for nausea 180 tablet 3     potassium chloride ER (KLOR-CON M) 20 MEQ CR tablet Take 20 meq three times daily. (Patient taking differently: Take 20 mEq by mouth 3 times daily Take 20 meq three times daily.) 90 tablet 3     terbinafine (LAMISIL AT) 1 % external cream Apply topically 2 times daily For fungal infection not resolved with other antifungals (e.g. Clotrimazole) (Patient taking differently: Apply topically 2 times daily as needed (toenail fungus) For fungal infection not resolved with other antifungals (e.g. Clotrimazole)) 24 g 11     tiZANidine (ZANAFLEX) 2 MG tablet Take 1-2 tablets (2-4 mg) by mouth 3 times daily as needed for muscle spasms (Patient taking differently: Take 2-4 mg by mouth 3 times daily as needed for muscle spasms Uses this or flexeril - prefers tizanidine.) 90 tablet 1     triamcinolone (KENALOG) 0.1 % external ointment Apply topically 2 times daily (Patient taking differently: Apply topically 2 times daily as needed ) 30 g 11     triamterene-HCTZ (MAXZIDE-25) 37.5-25 MG tablet Take 1 tablet by mouth daily 90 tablet 3     Vaginal Lubricant (REPHRESH) GEL Place 2 g vaginally every 3 days 2 g 3       Family History  Family History   Problem Relation Age of Onset     Neurologic Disorder Mother         Anuerysm of Cerebral Artery, Dementia     Diabetes Mother      Thyroid Disease Mother         ,     Cerebrovascular Disease Mother      Dementia Mother      Osteoporosis Mother      Heart Disease Father         AAA     Hypertension Father      Circulatory Brother         Perihperal Neurophathy     Diabetes Maternal Grandmother      Asthma  Maternal Grandmother      Chronic Obstructive Pulmonary Disease Maternal Grandfather         father     Asthma Maternal Grandfather      Diabetes Maternal Aunt         x2     Melanoma Maternal Aunt      Glaucoma Maternal Aunt      Breast Cancer Cousin      Dementia Other      Cancer Other         malignant melanoma     Hypertension Other      Hypertension Other      Cerebrovascular Disease Other      Cerebrovascular Disease Other      Obesity Other      Respiratory Other      Cancer Other      Diabetes Other      Asthma Other      Macular Degeneration No family hx of      Coronary Artery Disease No family hx of      Hyperlipidemia No family hx of      Kidney Disease No family hx of      Thrombosis No family hx of      Arthritis No family hx of      Depression No family hx of      Mental Illness No family hx of      Substance Abuse No family hx of      Cystic Fibrosis No family hx of      Early Death No family hx of      Coronary Artery Disease Early Onset No family hx of      Heart Failure No family hx of      Bleeding Diathesis No family hx of      Ovarian Cancer No family hx of      Uterine Cancer No family hx of      Prostate Cancer No family hx of      Colorectal Cancer No family hx of      Pancreatic Cancer No family hx of      Lung Cancer No family hx of      Other Cancer No family hx of      Autoimmune Disease No family hx of      Unknown/Adopted No family hx of      Genetic Disorder No family hx of      Bleeding Disorder No family hx of      Clotting Disorder No family hx of      Anesthesia Reaction No family hx of        The complete review of systems is negative other than noted in the HPI or here.     Preprocedure Note     Last edited 21 1440 by Loida Mendoza PA-C  Date of Service 21 1242  Creation Time 21 1242  Status: Addendum             Anesthesia Pre-Procedure Evaluation    Patient: Nadira Perez   MRN: [id number] : 1950        Preoperative Diagnosis:  Degenerative scoliosis in adult patient [M41.80]  Spondylolisthesis of lumbar region [M43.16]  Spinal stenosis of lumbar region with neurogenic claudication [M48.062]    Procedure : Procedure(s):  Part 1: Oblique Anterior Interbody Fusion at Lumbar 5 to sacral 1 with use of Bone Morphogenic Protein,  Part 2 Same Day Prone Positioning: Open Posterior Instrumented Spinal Fusion at Lumbar 5 to Sacral 1, with grimm aguayo osteotomy, use of O-Arm/Stealth  PAC EVALUATION       Past Medical History:   Diagnosis Date     Arthritis      Bone disease      Breast cancer (H)      H/O kyphoplasty      Hearing problem      History of kidney stones      History of radiation therapy      Hyperlipemia      Hypertension      Hypopotassemia      Kidney problem      Lymph edema      Medullary sponge kidney      Osteopenia      PONV (postoperative nausea and vomiting)      Reduced vision      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     Thyroid disease       Past Surgical History:   Procedure Laterality Date     ABDOMEN SURGERY      ovarian cyst, mesh     ARTHRODESIS WRIST Right      ARTHRODESIS WRIST  02/14/2013    Procedure: ARTHRODESIS WRIST;  left wrist scaphoid excision, four bone fusion, iliac crest bone graft  ( Mac with block);  Surgeon: Av Mendez MD;  [facility]: [facility]     BIOPSY      skin, vaginal     CATARACT IOL, RT/LT Right 03/13/2018     CATARACT IOL, RT/LT Left 02/20/2018     COLONOSCOPY  12/24/2013    Procedure: COMBINED COLONOSCOPY, SINGLE BIOPSY/POLYPECTOMY BY BIOPSY;  COLONOSCOPY;  Surgeon: Dom Alvarez MD;  Location:  GI     ESOPHAGOSCOPY, GASTROSCOPY, DUODENOSCOPY (EGD), COMBINED N/A 11/23/2016    Procedure: COMBINED ESOPHAGOSCOPY, GASTROSCOPY, DUODENOSCOPY (EGD);  Surgeon: Quinten Feliciano MD;  Location:  GI     EXTERNAL EAR SURGERY      right     EYE SURGERY      radial keratomy     GRAFT BONE FROM ILIAC CREST  02/14/2013    Procedure: GRAFT BONE FROM ILIAC CREST;  mac with block  and local infilitration;  Surgeon: Av Mendez MD;  Location: [facility]      BREATH HYDROGEN TEST N/A 10/14/2016    Procedure: HYDROGEN BREATH TEST;  Surgeon: Cheri Barron MD;  [facility]     HERNIA REPAIR      umbilical age 18 mos.     HYSTERECTOMY TOTAL ABDOMINAL  05/03/2000     MASTECTOMY MODIFIED RADICAL Bilateral     bilateral; right breast prophylactic     PARATHYROIDECTOMY  09/23/2004    R SUPERIOR     PARATHYROIDECTOMY  09/23/2004    parathyroid resection, subtotal     REMOVE HARDWARE HAND  09/24/2013    Procedure: REMOVE HARDWARE HAND;  Left Hand Screw Removal        RHINOPLASTY  1968     thyr proc skin closed cosmetic manner by subcuticular stitch  01/23/2009     THYROPLASTY  10/09/2009     TONSILLECTOMY  1977     WRIST SURGERY      wrist arthrodesis      Allergies   Allergen Reactions     Penicillins Hives     Around age 6 - doesn't recall full reaction, mother told her it was hives.     Has tolerated cephalsporins.       Social History     Tobacco Use     Smoking status: Never Smoker     Smokeless tobacco: Never Used   Substance Use Topics     Alcohol use: Not Currently     Alcohol/week: 7.0 standard drinks     Types: 7 Glasses of wine per week     Comment: Rare to occasional      Wt Readings from Last 1 Encounters:   11/04/21 78.9 kg (174 lb)        Anesthesia Evaluation   Pt has had prior anesthetic. Type: General.    History of anesthetic complications  - PONV.      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 She has dysphagia from this as well.     Hearing aids    (+) LOLY risk factors, snores loudly,  (-) tobacco use   Neurologic:     (+) peripheral neuropathy, - feet, hands.  (-) no seizures, no CVA and no TIA   Cardiovascular: Comment: Patient is s/p left axillary lymph node dissection and has lymphedema. She reports cannot have BP or IV on left arm.  "    (+) Dyslipidemia hypertension-----dysrhythmias, PVCs, Previous cardiac testing   Echo: Date: 2019 Results:  Interpretation Summary  Left ventricular function, chamber size, wall motion, and wall thickness are  normal.The EF is 60-65%.  Right ventricular function, chamber size, wall motion, and thickness are  normal.  No significant valvular abnormalities were noted.     Compared to the previous study from 6/29/2009, there has been no significant  change. PVCs observed during the study.    Stress Test: Date: Results:    ECG Reviewed: Date: 11/4/21 Results:  Sinus rhythm, LVH  Cath: Date: Results:      METS/Exercise Tolerance: 4 - Raking leaves, gardening    Hematologic:     (+) anemia,     Musculoskeletal: Comment: DDD  scoliosis  CTS on left   Knee pain    Osteoporosis    Patient uses a walker for balance    Possible fracture of right 2nd toe          GI/Hepatic:  - neg GI/hepatic ROS  (-) GERD   Renal/Genitourinary:       Endo: Comment: Impaired fasting glucose    (+) thyroid problem, hypothyroidism,     Psychiatric/Substance Use:  - neg psychiatric ROS     Infectious Disease:  - neg infectious disease ROS     Malignancy:   (+) Malignancy, History of Breast and Skin.Breast CA Remission status post Surgery, Chemo and Radiation.  Skin CA Remission status post Surgery.        Other: Comment: dermatitis           BP (!) 146/94 (BP Location: Right arm, Patient Position: Chair, Cuff Size: Adult Regular)   Pulse 70   Temp 98.8  F (37.1  C) (Oral)   Resp 16   Ht 1.702 m (5' 7\")   Wt 78.6 kg (173 lb 4.8 oz)   SpO2 99%   Breastfeeding No   BMI 27.14 kg/m           Physical Exam  Constitutional: Awake, alert, cooperative, no apparent distress, and appears stated age.  Eyes: Pupils equal, round and reactive to light, extra ocular muscles intact, sclera clear, conjunctiva normal.  HENT: Normocephalic, oral pharynx with moist mucus membranes, good dentition. No goiter appreciated.   Respiratory: Clear to auscultation " bilaterally, no crackles or wheezing.  Cardiovascular: Regular rate and rhythm, normal S1 and S2, and soft grade II systolic murmur noted.  Carotids +2, no bruits. No edema. Palpable pulses to radial  DP and PT arteries.   GI: Normal bowel sounds, soft, non-distended, non-tender, no masses palpated, no hepatosplenomegaly.  Surgical scars: well healed  Lymph/Hematologic: No cervical lymphadenopathy and no supraclavicular lymphadenopathy.  Genitourinary:  defer  Skin: Warm and dry.  No rashes at anticipated surgical site.   Musculoskeletal: Full ROM of neck. There is no redness, warmth, or swelling of the joints. Gross motor strength is normal.    Neurologic: Awake, alert, oriented to name, place and time. Cranial nerves II-XII are grossly intact. Gait is normal.   Neuropsychiatric: Calm, cooperative. Normal affect.     PRIOR LABS/DIAGNOSTIC STUDIES:   All labs and imaging personally reviewed     EKG/ stress test - if available please see in ROS above   Echo result w/o MOPS: Interpretation SummaryLeft ventricular function, chamber size, wall motion, and wall thickness arenormal.The EF is 60-65%.Right ventricular function, chamber size, wall motion, and thickness arenormal.No significant valvular abnormalities were noted. Compared to the previous study from 6/29/2009, there has been no significantchange. PVCs observed during the study.        The patient's records and results personally reviewed by this provider.     Outside records reviewed from: care everywhere    LAB/DIAGNOSTIC STUDIES:  Results for DWAYNE STILES (MRN [id number]) as of 11/8/2021 07:04   Ref. Range 11/5/2021 13:24 11/5/2021 13:24   Sodium Latest Ref Range: 133 - 144 mmol/L  142   Potassium Latest Ref Range: 3.4 - 5.3 mmol/L  3.4   Chloride Latest Ref Range: 94 - 109 mmol/L  110 (H)   Carbon Dioxide Latest Ref Range: 20 - 32 mmol/L  28   Urea Nitrogen Latest Ref Range: 7 - 30 mg/dL  25   Creatinine Latest Ref Range: 0.52 - 1.04 mg/dL  0.70   GFR  Estimate Latest Ref Range: >60 mL/min/1.73m2  89   Calcium Latest Ref Range: 8.5 - 10.1 mg/dL 9.4 9.2   Anion Gap Latest Ref Range: 3 - 14 mmol/L  4   Albumin Latest Ref Range: 3.4 - 5.0 g/dL 3.7 3.7   Protein Total Latest Ref Range: 6.8 - 8.8 g/dL  6.8   Bilirubin Total Latest Ref Range: 0.2 - 1.3 mg/dL  0.4   Alkaline Phosphatase Latest Ref Range: 40 - 150 U/L  63   ALT Latest Ref Range: 0 - 50 U/L  34   AST Latest Ref Range: 0 - 45 U/L  23   Ferritin Latest Ref Range: 8 - 252 ng/mL  42   Hemoglobin A1C Latest Ref Range: 0.0 - 5.6 % 5.9 (H)    Iron Latest Ref Range: 35 - 180 ug/dL  68   Iron Binding Cap Latest Ref Range: 240 - 430 ug/dL  280   Iron Saturation Index Latest Ref Range: 15 - 46 %  24   Glucose Latest Ref Range: 70 - 99 mg/dL  99   WBC Latest Ref Range: 4.0 - 11.0 10e3/uL 8.0    Hemoglobin Latest Ref Range: 11.7 - 15.7 g/dL 11.5 (L)    Hematocrit Latest Ref Range: 35.0 - 47.0 % 35.9    Platelet Count Latest Ref Range: 150 - 450 10e3/uL 255    RBC Count Latest Ref Range: 3.80 - 5.20 10e6/uL 3.97    MCV Latest Ref Range: 78 - 100 fL 90    MCH Latest Ref Range: 26.5 - 33.0 pg 29.0    MCHC Latest Ref Range: 31.5 - 36.5 g/dL 32.0    RDW Latest Ref Range: 10.0 - 15.0 % 14.5    ABO/Rh(D) Unknown O NEG    Antibody Screen Latest Ref Range: Negative  Negative    SPECIMEN EXPIRATION DATE Unknown [id number]    The patient's A1c is still in pre-diabetic range. She has anemia but iron levels do not show iron deficiency anemia.       ASSESSMENT and PLAN  Nadira is a 71 year old woman who is scheduled for Part 1: Oblique Anterior Interbody Fusion at Lumbar 5 to sacral 1 with use of Bone Morphogenic Protein, Part 2 Same Day Prone Positioning: Open Posterior Instrumented Spinal Fusion at Lumbar 5 to Sacral 1, with grimm aguayo osteotomy, use of O-Arm/Stealth on 11/18/21 by Dr. Ramirez in treatment of Degenerative scoliosis in adult patient.  PAC referral for risk assessment and optimization for anesthesia with  comorbid conditions of hypertension, hyperlipidemia, neuropathy, prediabetes, hypothyroidism, history of nephrolithiasis, history of breast cancer, dermatitis, history of squamous cell carcinoma, degenerative disc disease, scoliosis, carpal tunnel syndrome, knee pain and osteoporosis:      Pre-operative considerations:   1.  Cardiac:  Functional status- METS 4, the patient is doing PT and only uses a walker for balance. She denies any cardiac symptoms of chest pain, tightness or shortness of breath. She does have PVCs and reports sometimes will feel this. EKG completed today shows normal sinus rhythm with LVH with QRS widening. Intermediate risk surgery with 0.4% (RCRI #) risk of major adverse cardiac event.    ~ HTN - continue amlodipine, metoprolol and hold maxizide and lisinopril DOS.   ~ HLD - continue Lipitor.     2.  Pulm:  Airway feasible.  LOLY risk: Low (age, HTN)    3. Heme:   Anemia - hgb was 11.5 on 5/7/21. Will get iron studies and type and screen tomorrow with her already scheduled lab draw.   ~ The patient reports her IV and blood pressure need to be done on her right side given her left arm lymphedema.     4. 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 Fall precautions indicated.     5. Endo: hypothyroidism - continue levothyroxine.   ~ Pre-diabetes - A1c was 5.8 on 8/26/21.     6. GI:  Risk of PONV score = 3.  If > 2, anti-emetic intervention recommended.     7. :  history of nephrolithiasis - no current concerns  ~ History of breast cancer s/p bilateral mastectomy, radiation and chemotherapy.     8. Skin: Dermatitis - continue topical cream.   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     9. Musculoskeletal:  Degenerative scoliosis in adult patient - procedure as above  ~ carpal tunnel syndrome, knee pain, osteoporosis and possible 2nd toe fracture - consideration for careful positioning to minimize discomfort.  Continue Flexeril.     10. 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 She has some dysphagia issues. She met with Dr. Villalobos to discuss her intubation concerns today.   ~ Bilateral hearing loss with hearing aids.     VTE risk: 0.5%        Patient was discussed with Dr Villalobos.       The patient is optimized and acceptable candidate for proposed procedure. Arrival time, NPO, shower and medication instructions provided by nursing staff today.      On the day of service:     Prep time: 9 minutes  Visit time: 27 minutes  Documentation time: 20 minutes  ------------------------------------------  Total time: 56 minutes      Loida Mendoza PA-C  Preoperative Assessment Center  [facility]  Phone: [phone]  Fax: [phone]

## 2021-11-04 NOTE — H&P
Pre-Operative H & P     CC:  Preoperative exam to assess for increased cardiopulmonary risk while undergoing surgery and anesthesia.    Date of Encounter: 11/4/2021  Primary Care Physician:  Matilde Quiñonez     Reason for visit:   Encounter Diagnoses   Name Primary?     Pre-op examination Yes     Degenerative scoliosis in adult patient      Anemia, unspecified type        HPI  Nadira Perez is a 71 year old female who presents for pre-operative H & P in preparation for Part 1: Oblique Anterior Interbody Fusion at Lumbar 5 to sacral 1 with use of Bone Morphogenic Protein, Part 2 Same Day Prone Positioning: Open Posterior Instrumented Spinal Fusion at Lumbar 5 to Sacral 1, with grimm aguayo osteotomy, use of O-Arm/Stealth with Dr. Ramirez on 11/18/21 at [facility].     The patient is a 71-year-old woman with a past medical history significant for hypertension, hyperlipidemia, neuropathy, prediabetes, hypothyroidism, history of nephrolithiasis, history of breast cancer, dermatitis, history of squamous cell carcinoma, degenerative disc disease, scoliosis, carpal tunnel syndrome, knee pain and osteoporosis.  The patient met with Dr. Ramirez initially on 1/28/2020 for worsening chronic back pain.  At that visit they discussed her treatment options and she was not interested in pursuing surgery at this time. She then followed up again with Dr. Ramirez on 10/15/2021 as she began to have more back spasms with worsening symptoms with claudication.  They again discussed her treatment options and the patient is now scheduled for the procedure as above.    History is obtained from the patient and chart review      Hx of abnormal bleeding or anti-platelet use: none    Menstrual history: No LMP recorded. Patient has had a hysterectomy.:     Prior to Admission Medications  Current Outpatient Medications   Medication Sig Dispense Refill     Acetaminophen (APAP 500 PO) Take 1,000 mg  by mouth 3 times daily        amLODIPine (NORVASC) 10 MG tablet Take 1 tablet (10 mg) by mouth daily (Patient taking differently: Take 10 mg by mouth every 10 days Takes around 10 AM.) 90 tablet 3     anastrozole (ARIMIDEX) 1 MG tablet TAKE 1 TABLET BY MOUTH EVERY DAY (Patient taking differently: Take 1 mg by mouth daily ) 90 tablet 3     Ascorbic Acid (VITAMIN C) 500 MG CHEW Take 1 tablet by mouth daily       atorvastatin (LIPITOR) 80 MG tablet Take 1 tablet (80 mg) by mouth daily (Patient taking differently: Take 80 mg by mouth every evening ) 90 tablet 3     Cholecalciferol (VITAMIN D3) 50 MCG (2000 UT) CAPS Take 6,000 Units by mouth daily (Patient taking differently: Take 6,000 Units by mouth daily (with lunch) ) 270 capsule 3     CRANBERRY EXTRACT PO Take 650 mg by mouth daily ~10 am       cyclobenzaprine (FLEXERIL) 10 MG tablet Take 1 tablet (10 mg) by mouth 3 times daily as needed for muscle spasms 40 tablet 3     diclofenac (VOLTAREN) 1 % topical gel Apply 4 grams to knees or 2 grams to hands four times daily using enclosed dosing card. (Patient taking differently: Apply 4 grams to knees or 2 grams to hands four times daily as needed using enclosed dosing card.) 100 g 1     gabapentin (NEURONTIN) 300 MG capsule Take 1-2 capsules by mouth every 8 hours (Patient taking differently: Take 600 mg by mouth 3 times daily ) 540 capsule 3     HYDROcodone-acetaminophen (NORCO) 5-325 MG tablet Take 1 tablet by mouth every 6 hours as needed for severe pain 40 tablet 0     ibuprofen (ADVIL/MOTRIN) 200 MG tablet Take 600 mg by mouth 3 times daily        levothyroxine (SYNTHROID/LEVOTHROID) 112 MCG tablet TAKE 1/2 TABLET BY MOUTH DAILY 45 tablet 3     lisinopril (ZESTRIL) 40 MG tablet Take 1 tablet (40 mg) by mouth daily (Patient taking differently: Take 20 mg by mouth 2 times daily Takes 1/2 tablet at 2 PM and the other 1/2 tablet around 10 PM) 90 tablet 3     melatonin 5 MG tablet Take 10 mg by mouth At Bedtime         metoprolol succinate ER (TOPROL-XL) 50 MG 24 hr tablet Take 1 tablet (50 mg) by mouth daily (Patient taking differently: Take 50 mg by mouth every morning ) 90 tablet 3     Multiple Vitamin (MULTI-VITAMIN) per tablet Take 1 tablet by mouth daily (with lunch)        ondansetron (ZOFRAN-ODT) 4 MG ODT tab Take 1 tablet (4 mg) by mouth every 12 hours as needed for nausea 180 tablet 3     potassium chloride ER (KLOR-CON M) 20 MEQ CR tablet Take 20 meq three times daily. (Patient taking differently: Take 20 mEq by mouth 3 times daily Take 20 meq three times daily.) 90 tablet 3     terbinafine (LAMISIL AT) 1 % external cream Apply topically 2 times daily For fungal infection not resolved with other antifungals (e.g. Clotrimazole) (Patient taking differently: Apply topically 2 times daily as needed (toenail fungus) For fungal infection not resolved with other antifungals (e.g. Clotrimazole)) 24 g 11     tiZANidine (ZANAFLEX) 2 MG tablet Take 1-2 tablets (2-4 mg) by mouth 3 times daily as needed for muscle spasms (Patient taking differently: Take 2-4 mg by mouth 3 times daily as needed for muscle spasms Uses this or flexeril - prefers tizanidine.) 90 tablet 1     triamcinolone (KENALOG) 0.1 % external ointment Apply topically 2 times daily (Patient taking differently: Apply topically 2 times daily as needed ) 30 g 11     triamterene-HCTZ (MAXZIDE-25) 37.5-25 MG tablet Take 1 tablet by mouth daily 90 tablet 3     Vaginal Lubricant (REPHRESH) GEL Place 2 g vaginally every 3 days 2 g 3       Family History  Family History   Problem Relation Age of Onset     Neurologic Disorder Mother         Anuerysm of Cerebral Artery, Dementia     Diabetes Mother      Thyroid Disease Mother         ,     Cerebrovascular Disease Mother      Dementia Mother      Osteoporosis Mother      Heart Disease Father         AAA     Hypertension Father      Circulatory Brother         Perihperal Neurophathy     Diabetes Maternal Grandmother      Asthma  Maternal Grandmother      Chronic Obstructive Pulmonary Disease Maternal Grandfather         father     Asthma Maternal Grandfather      Diabetes Maternal Aunt         x2     Melanoma Maternal Aunt      Glaucoma Maternal Aunt      Breast Cancer Cousin      Dementia Other      Cancer Other         malignant melanoma     Hypertension Other      Hypertension Other      Cerebrovascular Disease Other      Cerebrovascular Disease Other      Obesity Other      Respiratory Other      Cancer Other      Diabetes Other      Asthma Other      Macular Degeneration No family hx of      Coronary Artery Disease No family hx of      Hyperlipidemia No family hx of      Kidney Disease No family hx of      Thrombosis No family hx of      Arthritis No family hx of      Depression No family hx of      Mental Illness No family hx of      Substance Abuse No family hx of      Cystic Fibrosis No family hx of      Early Death No family hx of      Coronary Artery Disease Early Onset No family hx of      Heart Failure No family hx of      Bleeding Diathesis No family hx of      Ovarian Cancer No family hx of      Uterine Cancer No family hx of      Prostate Cancer No family hx of      Colorectal Cancer No family hx of      Pancreatic Cancer No family hx of      Lung Cancer No family hx of      Other Cancer No family hx of      Autoimmune Disease No family hx of      Unknown/Adopted No family hx of      Genetic Disorder No family hx of      Bleeding Disorder No family hx of      Clotting Disorder No family hx of      Anesthesia Reaction No family hx of        The complete review of systems is negative other than noted in the HPI or here.     Preprocedure Note     Last edited 21 1440 by Loida Mendoza PA-C  Date of Service 21 1242  Creation Time 21 1242  Status: Addendum             Anesthesia Pre-Procedure Evaluation    Patient: Nadira Perez   MRN: [id number] : 1950        Preoperative Diagnosis:  Degenerative scoliosis in adult patient [M41.80]  Spondylolisthesis of lumbar region [M43.16]  Spinal stenosis of lumbar region with neurogenic claudication [M48.062]    Procedure : Procedure(s):  Part 1: Oblique Anterior Interbody Fusion at Lumbar 5 to sacral 1 with use of Bone Morphogenic Protein,  Part 2 Same Day Prone Positioning: Open Posterior Instrumented Spinal Fusion at Lumbar 5 to Sacral 1, with grimm aguayo osteotomy, use of O-Arm/Stealth  PAC EVALUATION       Past Medical History:   Diagnosis Date     Arthritis      Bone disease      Breast cancer (H)      H/O kyphoplasty      Hearing problem      History of kidney stones      History of radiation therapy      Hyperlipemia      Hypertension      Hypopotassemia      Kidney problem      Lymph edema      Medullary sponge kidney      Osteopenia      PONV (postoperative nausea and vomiting)      Reduced vision      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     Thyroid disease       Past Surgical History:   Procedure Laterality Date     ABDOMEN SURGERY      ovarian cyst, mesh     ARTHRODESIS WRIST Right      ARTHRODESIS WRIST  02/14/2013    Procedure: ARTHRODESIS WRIST;  left wrist scaphoid excision, four bone fusion, iliac crest bone graft  ( Mac with block);  Surgeon: Av Mendez MD;  [facility]     BIOPSY      skin, vaginal     CATARACT IOL, RT/LT Right 03/13/2018     CATARACT IOL, RT/LT Left 02/20/2018     COLONOSCOPY  12/24/2013    Procedure: COMBINED COLONOSCOPY, SINGLE BIOPSY/POLYPECTOMY BY BIOPSY;  COLONOSCOPY;  Surgeon: Dom Alvarez MD;  Location:  GI     ESOPHAGOSCOPY, GASTROSCOPY, DUODENOSCOPY (EGD), COMBINED N/A 11/23/2016    Procedure: COMBINED ESOPHAGOSCOPY, GASTROSCOPY, DUODENOSCOPY (EGD);  Surgeon: Quinten Feliciano MD;  Location:  GI     EXTERNAL EAR SURGERY      right     EYE SURGERY      radial keratomy     GRAFT BONE FROM ILIAC CREST  02/14/2013    Procedure: GRAFT BONE FROM ILIAC CREST;  mac with block  and local infilitration;  Surgeon: Av Mendez MD;  [facility]: [facility]      BREATH HYDROGEN TEST N/A 10/14/2016    Procedure: HYDROGEN BREATH TEST;  Surgeon: Cheri Barron MD;  [facility]     HERNIA REPAIR      umbilical age 18 mos.     HYSTERECTOMY TOTAL ABDOMINAL  05/03/2000     MASTECTOMY MODIFIED RADICAL Bilateral     bilateral; right breast prophylactic     PARATHYROIDECTOMY  09/23/2004    R SUPERIOR     PARATHYROIDECTOMY  09/23/2004    parathyroid resection, subtotal     REMOVE HARDWARE HAND  09/24/2013    Procedure: REMOVE HARDWARE HAND;  Left Hand Screw Removal        RHINOPLASTY  1968     thyr proc skin closed cosmetic manner by subcuticular stitch  01/23/2009     THYROPLASTY  10/09/2009     TONSILLECTOMY  1977     WRIST SURGERY      wrist arthrodesis      Allergies   Allergen Reactions     Penicillins Hives     Around age 6 - doesn't recall full reaction, mother told her it was hives.     Has tolerated cephalsporins.       Social History     Tobacco Use     Smoking status: Never Smoker     Smokeless tobacco: Never Used   Substance Use Topics     Alcohol use: Not Currently     Alcohol/week: 7.0 standard drinks     Types: 7 Glasses of wine per week     Comment: Rare to occasional      Wt Readings from Last 1 Encounters:   11/04/21 78.9 kg (174 lb)        Anesthesia Evaluation   Pt has had prior anesthetic. Type: General.    History of anesthetic complications  - PONV.      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 She has dysphagia from this as well.     Hearing aids    (+) LOLY risk factors, snores loudly,  (-) tobacco use   Neurologic:     (+) peripheral neuropathy, - feet, hands.  (-) no seizures, no CVA and no TIA   Cardiovascular: Comment: Patient is s/p left axillary lymph node dissection and has lymphedema. She reports cannot have BP or IV on left arm.  "    (+) Dyslipidemia hypertension-----dysrhythmias, PVCs, Previous cardiac testing   Echo: Date: 2019 Results:  Interpretation Summary  Left ventricular function, chamber size, wall motion, and wall thickness are  normal.The EF is 60-65%.  Right ventricular function, chamber size, wall motion, and thickness are  normal.  No significant valvular abnormalities were noted.     Compared to the previous study from 6/29/2009, there has been no significant  change. PVCs observed during the study.    Stress Test: Date: Results:    ECG Reviewed: Date: 11/4/21 Results:  Sinus rhythm, LVH  Cath: Date: Results:      METS/Exercise Tolerance: 4 - Raking leaves, gardening    Hematologic:     (+) anemia,     Musculoskeletal: Comment: DDD  scoliosis  CTS on left   Knee pain    Osteoporosis    Patient uses a walker for balance    Possible fracture of right 2nd toe          GI/Hepatic:  - neg GI/hepatic ROS  (-) GERD   Renal/Genitourinary:       Endo: Comment: Impaired fasting glucose    (+) thyroid problem, hypothyroidism,     Psychiatric/Substance Use:  - neg psychiatric ROS     Infectious Disease:  - neg infectious disease ROS     Malignancy:   (+) Malignancy, History of Breast and Skin.Breast CA Remission status post Surgery, Chemo and Radiation.  Skin CA Remission status post Surgery.        Other: Comment: dermatitis           BP (!) 146/94 (BP Location: Right arm, Patient Position: Chair, Cuff Size: Adult Regular)   Pulse 70   Temp 98.8  F (37.1  C) (Oral)   Resp 16   Ht 1.702 m (5' 7\")   Wt 78.6 kg (173 lb 4.8 oz)   SpO2 99%   Breastfeeding No   BMI 27.14 kg/m           Physical Exam  Constitutional: Awake, alert, cooperative, no apparent distress, and appears stated age.  Eyes: Pupils equal, round and reactive to light, extra ocular muscles intact, sclera clear, conjunctiva normal.  HENT: Normocephalic, oral pharynx with moist mucus membranes, good dentition. No goiter appreciated.   Respiratory: Clear to auscultation " bilaterally, no crackles or wheezing.  Cardiovascular: Regular rate and rhythm, normal S1 and S2, and soft grade II systolic murmur noted.  Carotids +2, no bruits. No edema. Palpable pulses to radial  DP and PT arteries.   GI: Normal bowel sounds, soft, non-distended, non-tender, no masses palpated, no hepatosplenomegaly.  Surgical scars: well healed  Lymph/Hematologic: No cervical lymphadenopathy and no supraclavicular lymphadenopathy.  Genitourinary:  defer  Skin: Warm and dry.  No rashes at anticipated surgical site.   Musculoskeletal: Full ROM of neck. There is no redness, warmth, or swelling of the joints. Gross motor strength is normal.    Neurologic: Awake, alert, oriented to name, place and time. Cranial nerves II-XII are grossly intact. Gait is normal.   Neuropsychiatric: Calm, cooperative. Normal affect.     PRIOR LABS/DIAGNOSTIC STUDIES:   All labs and imaging personally reviewed     EKG/ stress test - if available please see in ROS above   Echo result w/o MOPS: Interpretation SummaryLeft ventricular function, chamber size, wall motion, and wall thickness arenormal.The EF is 60-65%.Right ventricular function, chamber size, wall motion, and thickness arenormal.No significant valvular abnormalities were noted. Compared to the previous study from 6/29/2009, there has been no significantchange. PVCs observed during the study.        The patient's records and results personally reviewed by this provider.     Outside records reviewed from: care everywhere    LAB/DIAGNOSTIC STUDIES:  Results for DWAYNE STILES (MRN [id number]) as of 11/8/2021 07:04   Ref. Range 11/5/2021 13:24 11/5/2021 13:24   Sodium Latest Ref Range: 133 - 144 mmol/L  142   Potassium Latest Ref Range: 3.4 - 5.3 mmol/L  3.4   Chloride Latest Ref Range: 94 - 109 mmol/L  110 (H)   Carbon Dioxide Latest Ref Range: 20 - 32 mmol/L  28   Urea Nitrogen Latest Ref Range: 7 - 30 mg/dL  25   Creatinine Latest Ref Range: 0.52 - 1.04 mg/dL  0.70   GFR  Estimate Latest Ref Range: >60 mL/min/1.73m2  89   Calcium Latest Ref Range: 8.5 - 10.1 mg/dL 9.4 9.2   Anion Gap Latest Ref Range: 3 - 14 mmol/L  4   Albumin Latest Ref Range: 3.4 - 5.0 g/dL 3.7 3.7   Protein Total Latest Ref Range: 6.8 - 8.8 g/dL  6.8   Bilirubin Total Latest Ref Range: 0.2 - 1.3 mg/dL  0.4   Alkaline Phosphatase Latest Ref Range: 40 - 150 U/L  63   ALT Latest Ref Range: 0 - 50 U/L  34   AST Latest Ref Range: 0 - 45 U/L  23   Ferritin Latest Ref Range: 8 - 252 ng/mL  42   Hemoglobin A1C Latest Ref Range: 0.0 - 5.6 % 5.9 (H)    Iron Latest Ref Range: 35 - 180 ug/dL  68   Iron Binding Cap Latest Ref Range: 240 - 430 ug/dL  280   Iron Saturation Index Latest Ref Range: 15 - 46 %  24   Glucose Latest Ref Range: 70 - 99 mg/dL  99   WBC Latest Ref Range: 4.0 - 11.0 10e3/uL 8.0    Hemoglobin Latest Ref Range: 11.7 - 15.7 g/dL 11.5 (L)    Hematocrit Latest Ref Range: 35.0 - 47.0 % 35.9    Platelet Count Latest Ref Range: 150 - 450 10e3/uL 255    RBC Count Latest Ref Range: 3.80 - 5.20 10e6/uL 3.97    MCV Latest Ref Range: 78 - 100 fL 90    MCH Latest Ref Range: 26.5 - 33.0 pg 29.0    MCHC Latest Ref Range: 31.5 - 36.5 g/dL 32.0    RDW Latest Ref Range: 10.0 - 15.0 % 14.5    ABO/Rh(D) Unknown O NEG    Antibody Screen Latest Ref Range: Negative  Negative    SPECIMEN EXPIRATION DATE Unknown [id number]    The patient's A1c is still in pre-diabetic range. She has anemia but iron levels do not show iron deficiency anemia.       ASSESSMENT and PLAN  Nadira is a 71 year old woman who is scheduled for Part 1: Oblique Anterior Interbody Fusion at Lumbar 5 to sacral 1 with use of Bone Morphogenic Protein, Part 2 Same Day Prone Positioning: Open Posterior Instrumented Spinal Fusion at Lumbar 5 to Sacral 1, with grimm aguayo osteotomy, use of O-Arm/Stealth on 11/18/21 by Dr. Ramirez in treatment of Degenerative scoliosis in adult patient.  PAC referral for risk assessment and optimization for anesthesia with  comorbid conditions of hypertension, hyperlipidemia, neuropathy, prediabetes, hypothyroidism, history of nephrolithiasis, history of breast cancer, dermatitis, history of squamous cell carcinoma, degenerative disc disease, scoliosis, carpal tunnel syndrome, knee pain and osteoporosis:      Pre-operative considerations:   1.  Cardiac:  Functional status- METS 4, the patient is doing PT and only uses a walker for balance. She denies any cardiac symptoms of chest pain, tightness or shortness of breath. She does have PVCs and reports sometimes will feel this. EKG completed today shows normal sinus rhythm with LVH with QRS widening. Intermediate risk surgery with 0.4% (RCRI #) risk of major adverse cardiac event.    ~ HTN - continue amlodipine, metoprolol and hold maxizide and lisinopril DOS.   ~ HLD - continue Lipitor.     2.  Pulm:  Airway feasible.  LOLY risk: Low (age, HTN)    3. Heme:   Anemia - hgb was 11.5 on 5/7/21. Will get iron studies and type and screen tomorrow with her already scheduled lab draw.   ~ The patient reports her IV and blood pressure need to be done on her right side given her left arm lymphedema.     4. 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 Fall precautions indicated.     5. Endo: hypothyroidism - continue levothyroxine.   ~ Pre-diabetes - A1c was 5.8 on 8/26/21.     6. GI:  Risk of PONV score = 3.  If > 2, anti-emetic intervention recommended.     7. :  history of nephrolithiasis - no current concerns  ~ History of breast cancer s/p bilateral mastectomy, radiation and chemotherapy.     8. Skin: Dermatitis - continue topical cream.   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     9. Musculoskeletal:  Degenerative scoliosis in adult patient - procedure as above  ~ carpal tunnel syndrome, knee pain, osteoporosis and possible 2nd toe fracture - consideration for careful positioning to minimize discomfort.  Continue Flexeril.     10. 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 She has some dysphagia issues. She met with Dr. Villalobos to discuss her intubation concerns today.   ~ Bilateral hearing loss with hearing aids.     VTE risk: 0.5%        Patient was discussed with Dr Villalobos.       The patient is optimized and acceptable candidate for proposed procedure. Arrival time, NPO, shower and medication instructions provided by nursing staff today.      On the day of service:     Prep time: 9 minutes  Visit time: 27 minutes  Documentation time: 20 minutes  ------------------------------------------  Total time: 56 minutes      Loida Mendoza PA-C  Preoperative Assessment Center  [facility]  Phone: [phone]  Fax: [phone]

## 2021-11-04 NOTE — PATIENT INSTRUCTIONS
Preparing for Your Surgery      Name:  Nadira Perez   MRN:  [id number]   :  1950   Today's Date:  2021       Arriving for surgery:  Surgery date:  21  Arrival time:  5:45AM    Restrictions due to COVID 19:       One visitor is allowed in the Pre Op area.       When you go into surgery, one visitor is allowed to wait in the Surgery Waiting Room       (provided there is enough space to social distance).         In hospital patients are allowed 1 visitor per day       The visitor may change daily     Visiting Hours: 8 am - 8:30 pm   No ill visitors.   All visitors must wear face mask.    [employer] parking is available for anyone with mobility limitations or disabilities.  ([place]  24 hours/ 7 days a week; [facility] [place]  7 am- 3:30 pm, Mon- Fri)    Please come to:     [facility] [address]  [address] [place]  [address]    -[employer] parking is available in front of [facility] from 7:00AM to 3:30PM. If you prefer, park your car in the Green Lot.    -Proceed to the 3rd floor, check in at the Adult Surgery Waiting Lounge. [phone]    If an escort is needed stop at the Information Desk in the lobby. Inform the information person that you are here for surgery. An escort to the Adult Surgery Waiting Lounge will be provided.        What can I eat or drink?  -  You may eat and drink normally for up to 8 hours before your surgery. (Until 21, 11:45PM)  -  You may have clear liquids until 2 hours before surgery. (Until 21, 5:45AM)    Examples of clear liquids:  Water  Clear broth  Juices (apple, white grape, white cranberry  and cider) without pulp  Noncarbonated, powder based beverages  (lemonade and Gelacio-Aid)  Sodas (Sprite, 7-Up, ginger ale and seltzer)  Coffee or tea (without milk or cream)  Gatorade    -  No Alcohol for at least 24 hours before surgery     Which medicines can I take?    Hold Aspirin for 7 days  before surgery.   Hold Multivitamins for 7 days before surgery.  Hold Supplements, cranberry  for 7 days before surgery.  Hold Ibuprofen (Advil, Motrin) for 1 day before surgery--unless otherwise directed by surgeon.  Hold Diclofenac(Voltaren) gel for 24 hours prior to surgery.   Hold Naproxen (Aleve) for 4 days before surgery.    -  DO NOT take these medications the day of surgery:    Vitamin C   Vitamin D3    Lisinopril   Potassium    Triamterene-hydrochlorothiazide     -  PLEASE TAKE these medications the day of surgery:    Acetaminophen(Tylenol)   Amlodipine(Norvasc)    Anastrozole(Arimidex)   Cyclobenzaprine(Flexeril) as needed    Gabapentin(Neurontin)  Hydrocodone-Acetaminophen(Norco) as needed    Levothyroxine    Metoprolol    Ondansetron(Zofran) as needed Tizanidine(Zanaflex) as needed        How do I prepare myself?  - Please take 2 showers before surgery using Scrubcare or Hibiclens soap.    Use this soap only from the neck to your toes.     Leave the soap on your skin for one minute--then rinse thoroughly.      You may use your own shampoo and conditioner; no other hair products.   - Please remove all jewelry and body piercings.  - No lotions, deodorants or fragrance.  - No makeup or fingernail polish.   - Bring your ID and insurance card.    -If you have a Deep Brain Stimulator, Spinal Cord Stimulator or any neuro stimulator device---you must bring the remote control to the hospital     - All patients are required to have a Covid-19 test within 4 days of surgery/procedure.      -Patients will be contacted by the [facility] scheduling team within 1 week of surgery to make an appointment.      - Patients may call the Scheduling team at [phone] if they have not been scheduled within 4 days of  surgery.          Questions or Concerns:    - For any questions regarding the day of surgery or your hospital stay, please contact the Pre Admission Nursing Office at [phone].       - If you have  health changes between today and your surgery please call your surgeon.       For questions after surgery please call your surgeons office.

## 2021-11-05 ENCOUNTER — ANCILLARY PROCEDURE (OUTPATIENT)
Dept: BONE DENSITY | Facility: CLINIC | Age: 71
End: 2021-11-05
Attending: INTERNAL MEDICINE
Payer: COMMERCIAL

## 2021-11-05 ENCOUNTER — MYC MEDICAL ADVICE (OUTPATIENT)
Dept: ORTHOPEDICS | Facility: CLINIC | Age: 71
End: 2021-11-05

## 2021-11-05 ENCOUNTER — LAB (OUTPATIENT)
Dept: LAB | Facility: CLINIC | Age: 71
End: 2021-11-05
Attending: INTERNAL MEDICINE
Payer: COMMERCIAL

## 2021-11-05 DIAGNOSIS — D64.9 ANEMIA, UNSPECIFIED TYPE: ICD-10-CM

## 2021-11-05 DIAGNOSIS — M85.80 OSTEOPENIA, UNSPECIFIED LOCATION: ICD-10-CM

## 2021-11-05 DIAGNOSIS — M41.50 DEGENERATIVE SCOLIOSIS IN ADULT PATIENT: ICD-10-CM

## 2021-11-05 DIAGNOSIS — Z13.828 SCREENING FOR RHEUMATOID ARTHRITIS: ICD-10-CM

## 2021-11-05 DIAGNOSIS — C50.919 MALIGNANT NEOPLASM OF FEMALE BREAST, UNSPECIFIED ESTROGEN RECEPTOR STATUS, UNSPECIFIED LATERALITY, UNSPECIFIED SITE OF BREAST (H): ICD-10-CM

## 2021-11-05 DIAGNOSIS — M81.0 OSTEOPOROSIS: ICD-10-CM

## 2021-11-05 DIAGNOSIS — R73.09 ELEVATED GLUCOSE: ICD-10-CM

## 2021-11-05 DIAGNOSIS — C50.919 MALIGNANT NEOPLASM OF FEMALE BREAST (H): ICD-10-CM

## 2021-11-05 DIAGNOSIS — Z01.818 PREOPERATIVE EXAMINATION: ICD-10-CM

## 2021-11-05 DIAGNOSIS — Z01.818 PRE-OP EXAMINATION: ICD-10-CM

## 2021-11-05 DIAGNOSIS — Z79.811 LONG TERM (CURRENT) USE OF AROMATASE INHIBITORS: ICD-10-CM

## 2021-11-05 LAB
ABO/RH(D): NORMAL
ALBUMIN SERPL-MCNC: 3.7 G/DL (ref 3.4–5)
ALBUMIN SERPL-MCNC: 3.7 G/DL (ref 3.4–5)
ALP SERPL-CCNC: 63 U/L (ref 40–150)
ALT SERPL W P-5'-P-CCNC: 34 U/L (ref 0–50)
ANION GAP SERPL CALCULATED.3IONS-SCNC: 4 MMOL/L (ref 3–14)
ANTIBODY SCREEN: NEGATIVE
AST SERPL W P-5'-P-CCNC: 23 U/L (ref 0–45)
ATRIAL RATE - MUSE: 66 BPM
BILIRUB SERPL-MCNC: 0.4 MG/DL (ref 0.2–1.3)
BUN SERPL-MCNC: 25 MG/DL (ref 7–30)
CALCIUM SERPL-MCNC: 9.2 MG/DL (ref 8.5–10.1)
CALCIUM SERPL-MCNC: 9.4 MG/DL (ref 8.5–10.1)
CHLORIDE BLD-SCNC: 110 MMOL/L (ref 94–109)
CO2 SERPL-SCNC: 28 MMOL/L (ref 20–32)
CREAT SERPL-MCNC: 0.7 MG/DL (ref 0.52–1.04)
DIASTOLIC BLOOD PRESSURE - MUSE: NORMAL MMHG
ERYTHROCYTE [DISTWIDTH] IN BLOOD BY AUTOMATED COUNT: 14.5 % (ref 10–15)
FERRITIN SERPL-MCNC: 42 NG/ML (ref 8–252)
GFR SERPL CREATININE-BSD FRML MDRD: 89 ML/MIN/1.73M2
GLUCOSE BLD-MCNC: 99 MG/DL (ref 70–99)
HBA1C MFR BLD: 5.9 % (ref 0–5.6)
HCT VFR BLD AUTO: 35.9 % (ref 35–47)
HGB BLD-MCNC: 11.5 G/DL (ref 11.7–15.7)
INTERPRETATION ECG - MUSE: NORMAL
IRON SATN MFR SERPL: 24 % (ref 15–46)
IRON SERPL-MCNC: 68 UG/DL (ref 35–180)
MCH RBC QN AUTO: 29 PG (ref 26.5–33)
MCHC RBC AUTO-ENTMCNC: 32 G/DL (ref 31.5–36.5)
MCV RBC AUTO: 90 FL (ref 78–100)
P AXIS - MUSE: 59 DEGREES
PLATELET # BLD AUTO: 255 10E3/UL (ref 150–450)
POTASSIUM BLD-SCNC: 3.4 MMOL/L (ref 3.4–5.3)
PR INTERVAL - MUSE: 164 MS
PROT SERPL-MCNC: 6.8 G/DL (ref 6.8–8.8)
QRS DURATION - MUSE: 124 MS
QT - MUSE: 428 MS
QTC - MUSE: 448 MS
R AXIS - MUSE: -1 DEGREES
RBC # BLD AUTO: 3.97 10E6/UL (ref 3.8–5.2)
SODIUM SERPL-SCNC: 142 MMOL/L (ref 133–144)
SPECIMEN EXPIRATION DATE: NORMAL
SYSTOLIC BLOOD PRESSURE - MUSE: NORMAL MMHG
T AXIS - MUSE: 14 DEGREES
TIBC SERPL-MCNC: 280 UG/DL (ref 240–430)
VENTRICULAR RATE- MUSE: 66 BPM
WBC # BLD AUTO: 8 10E3/UL (ref 4–11)

## 2021-11-05 PROCEDURE — 83036 HEMOGLOBIN GLYCOSYLATED A1C: CPT

## 2021-11-05 PROCEDURE — 82310 ASSAY OF CALCIUM: CPT

## 2021-11-05 PROCEDURE — 83550 IRON BINDING TEST: CPT

## 2021-11-05 PROCEDURE — 82728 ASSAY OF FERRITIN: CPT

## 2021-11-05 PROCEDURE — 86431 RHEUMATOID FACTOR QUANT: CPT

## 2021-11-05 PROCEDURE — 36415 COLL VENOUS BLD VENIPUNCTURE: CPT

## 2021-11-05 PROCEDURE — 82040 ASSAY OF SERUM ALBUMIN: CPT

## 2021-11-05 PROCEDURE — 77081 DXA BONE DENSITY APPENDICULR: CPT | Mod: XU

## 2021-11-05 PROCEDURE — 86900 BLOOD TYPING SEROLOGIC ABO: CPT

## 2021-11-05 PROCEDURE — 85014 HEMATOCRIT: CPT

## 2021-11-05 PROCEDURE — 77080 DXA BONE DENSITY AXIAL: CPT

## 2021-11-05 NOTE — NURSING NOTE
Chief Complaint   Patient presents with     Blood Draw     Labs drawn via  by RN in lab. VS taken.      Labs drawn via venipuncture. Vital signs taken. Checked into next appointment.   Shannan Burrows RN

## 2021-11-06 ENCOUNTER — HOSPITAL ENCOUNTER (OUTPATIENT)
Dept: MRI IMAGING | Facility: CLINIC | Age: 71
Discharge: HOME | End: 2021-11-06
Attending: STUDENT IN AN ORGANIZED HEALTH CARE EDUCATION/TRAINING PROGRAM | Admitting: STUDENT IN AN ORGANIZED HEALTH CARE EDUCATION/TRAINING PROGRAM
Payer: COMMERCIAL

## 2021-11-06 ENCOUNTER — MYC MEDICAL ADVICE (OUTPATIENT)
Dept: INTERNAL MEDICINE | Facility: CLINIC | Age: 71
End: 2021-11-06

## 2021-11-06 DIAGNOSIS — E78.00 PURE HYPERCHOLESTEROLEMIA: Primary | ICD-10-CM

## 2021-11-06 DIAGNOSIS — M79.674 PAIN IN RIGHT TOE(S): ICD-10-CM

## 2021-11-06 PROCEDURE — 73718 MRI LOWER EXTREMITY W/O DYE: CPT | Mod: RT

## 2021-11-06 PROCEDURE — 73718 MRI LOWER EXTREMITY W/O DYE: CPT | Mod: 26 | Performed by: RADIOLOGY

## 2021-11-08 ENCOUNTER — ALLIED HEALTH/NURSE VISIT (OUTPATIENT)
Dept: OPHTHALMOLOGY | Facility: CLINIC | Age: 71
End: 2021-11-08
Attending: OPHTHALMOLOGY
Payer: COMMERCIAL

## 2021-11-08 ENCOUNTER — INFUSION THERAPY VISIT (OUTPATIENT)
Dept: ONCOLOGY | Facility: CLINIC | Age: 71
End: 2021-11-08
Attending: INTERNAL MEDICINE
Payer: COMMERCIAL

## 2021-11-08 ENCOUNTER — MYC MEDICAL ADVICE (OUTPATIENT)
Dept: SURGERY | Facility: CLINIC | Age: 71
End: 2021-11-08
Payer: COMMERCIAL

## 2021-11-08 VITALS
WEIGHT: 166 LBS | RESPIRATION RATE: 16 BRPM | HEART RATE: 90 BPM | BODY MASS INDEX: 26.06 KG/M2 | TEMPERATURE: 99.8 F | OXYGEN SATURATION: 99 % | SYSTOLIC BLOOD PRESSURE: 150 MMHG | DIASTOLIC BLOOD PRESSURE: 89 MMHG | HEIGHT: 67 IN

## 2021-11-08 DIAGNOSIS — Z79.811 AROMATASE INHIBITOR USE: ICD-10-CM

## 2021-11-08 DIAGNOSIS — C50.912 MALIGNANT NEOPLASM OF LEFT BREAST IN FEMALE, ESTROGEN RECEPTOR POSITIVE, UNSPECIFIED SITE OF BREAST (H): ICD-10-CM

## 2021-11-08 DIAGNOSIS — Z17.0 MALIGNANT NEOPLASM OF LEFT BREAST IN FEMALE, ESTROGEN RECEPTOR POSITIVE, UNSPECIFIED SITE OF BREAST (H): ICD-10-CM

## 2021-11-08 DIAGNOSIS — C50.919 MALIGNANT NEOPLASM OF FEMALE BREAST (H): Primary | ICD-10-CM

## 2021-11-08 DIAGNOSIS — M81.0 OSTEOPOROSIS, UNSPECIFIED OSTEOPOROSIS TYPE, UNSPECIFIED PATHOLOGICAL FRACTURE PRESENCE: ICD-10-CM

## 2021-11-08 DIAGNOSIS — H52.13 MYOPIA OF BOTH EYES: Primary | ICD-10-CM

## 2021-11-08 DIAGNOSIS — E03.9 HYPOTHYROIDISM, UNSPECIFIED TYPE: ICD-10-CM

## 2021-11-08 DIAGNOSIS — Z79.811 LONG TERM (CURRENT) USE OF AROMATASE INHIBITORS: Primary | ICD-10-CM

## 2021-11-08 DIAGNOSIS — M81.0 OSTEOPOROSIS: ICD-10-CM

## 2021-11-08 DIAGNOSIS — M85.80 OSTEOPENIA, UNSPECIFIED LOCATION: ICD-10-CM

## 2021-11-08 LAB — RHEUMATOID FACT SER NEPH-ACNC: <7 IU/ML

## 2021-11-08 PROCEDURE — 96365 THER/PROPH/DIAG IV INF INIT: CPT

## 2021-11-08 PROCEDURE — 250N000011 HC RX IP 250 OP 636: Performed by: INTERNAL MEDICINE

## 2021-11-08 PROCEDURE — 99213 OFFICE O/P EST LOW 20 MIN: CPT | Performed by: INTERNAL MEDICINE

## 2021-11-08 PROCEDURE — 99207 PR NO CHARGE COORDINATED CARE PS: CPT

## 2021-11-08 PROCEDURE — G0463 HOSPITAL OUTPT CLINIC VISIT: HCPCS

## 2021-11-08 PROCEDURE — 258N000003 HC RX IP 258 OP 636: Performed by: INTERNAL MEDICINE

## 2021-11-08 RX ORDER — ZOLEDRONIC ACID 0.04 MG/ML
4 INJECTION, SOLUTION INTRAVENOUS ONCE
Status: COMPLETED | OUTPATIENT
Start: 2021-11-08 | End: 2021-11-08

## 2021-11-08 RX ORDER — ZOLEDRONIC ACID 0.04 MG/ML
4 INJECTION, SOLUTION INTRAVENOUS ONCE
Status: CANCELLED | OUTPATIENT
Start: 2021-11-08

## 2021-11-08 RX ADMIN — SODIUM CHLORIDE 250 ML: 9 INJECTION, SOLUTION INTRAVENOUS at 11:15

## 2021-11-08 RX ADMIN — ZOLEDRONIC ACID 4 MG: 0.04 INJECTION, SOLUTION INTRAVENOUS at 11:15

## 2021-11-08 ASSESSMENT — VISUAL ACUITY
OD_SC: 20/25
OS_SC: 20/40
OD_SC+: -2
METHOD: SNELLEN - LINEAR

## 2021-11-08 ASSESSMENT — TONOMETRY
IOP_METHOD: TONOPEN
OD_IOP_MMHG: 18
OS_IOP_MMHG: 18

## 2021-11-08 ASSESSMENT — MIFFLIN-ST. JEOR: SCORE: 1310.72

## 2021-11-08 ASSESSMENT — PAIN SCALES - GENERAL: PAINLEVEL: MODERATE PAIN (4)

## 2021-11-08 ASSESSMENT — REFRACTION_MANIFEST
OD_CYLINDER: +0.75
OD_SPHERE: -0.50
OD_ADD: +2.25
OS_SPHERE: -1.75
OS_CYLINDER: +1.75
OS_ADD: +2.25
OS_AXIS: 045
OD_AXIS: 003

## 2021-11-08 NOTE — Clinical Note
Patient has dry eyes, but used gtts to help her see better while testing.  Please review and close.

## 2021-11-08 NOTE — PROGRESS NOTES
Patient was able to see better with both eyes open.    Reviewed and agree with the prescription. Felix Oliveros MD

## 2021-11-08 NOTE — LETTER
11/8/2021         RE: Nadira Perez  [address]  [facility] [address]        Dear Colleague,    Thank you for referring your patient, Nadira Perez, to the [facility]ER [facility]. Please see a copy of my visit note below.    [facility] CANCER CLINIC  ONCOLOGY FOLLOW-UP VISIT NOTE    PATIENT NAME: Nadira Perez    YOB: 1950  MRN :[id number]  DATE OF VISIT: Nov 8, 2021     REASON FOR VISIT : breast cancer follow up.     HISTORY OF PRESENT ILLNESS : The patient is a 70-year-old woman who in 06/2007 had the onset of left-sided breast discomfort that extended into the axilla. She ultimately did undergo mammogram on 06/13/2007, which identified a mass at the 2:30 position in the left breast. Ultrasound also was notable for some skin and areolar complex thickening. She did undergo biopsy of the mass, and this was consistent with an infiltrating ductal carcinoma that was grade 2. The tumor was ER positive, NE positive, and HER2 negative by FISH. She also had a biopsy of left axillary lymph node versus the tail of the axilla, and this was consistent with metastatic carcinoma. Following the diagnosis the patient did have metastatic staging to include a PET CT scan. This demonstrated increased activity in the left breast as well as the lymph nodes, but was otherwise negative. There was also an MRI of the breast performed, and this demonstrated other lesions of the left breast, but no abnormalities on the right.     She initiated neoadjuvant chemotherapy for her inflammatory breast cancer on 07/13/2007. She received  for 3 cycles through 08/24/2007. This was followed by Taxotere for 3 cycles, which was administered 09/14/2007 through 10/26/2007. Following her chemotherapy she did undergo a left-sided mastectomy with axillary lymph node dissection on 11/20/2007 by Dr. Mauro Mei. The patient did have residual carcinoma with a 1.0 cm tumor  "as well as associated DCIS. Thirteen of 33 lymph nodes were positive, the largest of which was 0.6 cm of tumor infiltration and included extracapsular extension. The patient also had a right-sided prophylactic mastectomy, which did not demonstrate any noninvasive or invasive carcinoma. Just prior to her surgery Ismael was placed on Arimidex (start date 11/15/2007). Following her surgery she did have some issues with left chest wall cellulitis as well as some lymphedema of the left upper extremity. She ultimately did go on to receive radiation therapy under the direction of Dr. Nghia Burch. She received radiation to the left chest wall at a dose of 6440 cGy, to the left supraclavicular fossa at a dose of 5040 cGy, and to the left internal mammary chain at a dose of 5080 cGy. Last dose of radiation was administered on 03/07/2008.     INTERVAL HISTORY:  Nadira is here for routine visit.   Ms. Perez completed 10 years of endocrine therapy with Arimidex and then tamoxifen.  In 11/2017, she then went back on Arimidex for a prolonged extended therapy as per the MA17 trial.      She says she is overall tolerating it well.  She continues to have intermittent arthralgias worse in her knees.  She denies hot flashes or vaginal bleeding. She is using ReFresh for vaginal dryness but has not been recently engaging in intercourse.      She primarily does stretching type exercises.       She retired and now is \"decluttering\" with plans to start a nonprofit.     No fevers or chills, no chest pain or shortness of breath, no nausea.       No new medical problems.  She continues to have intermittent neuropathy.      REVIEW OF SYSTEMS:  Her 10-point review of systems is otherwise negative.       PHYSICAL EXAMINATION:   There were no vitals taken for this visit.  Wt Readings from Last 5 Encounters:   11/04/21 78.6 kg (173 lb 4.8 oz)   10/29/21 75.8 kg (167 lb)   10/29/21 75.8 kg (167 lb)   10/28/21 75.8 kg (167 lb)   10/23/21 75.8 kg " (167 lb)     GENERAL : Alert and oriented , not in distress .   HEENT: Normocephalic head.  Anicteric sclerae.No oral ulceration. No nystagmus.    NECK: Supple, no thyromegaly.   LYMPH:  No cervical, supraclavicular, infraclavicular, or axillary lymphadenopathy.  BREAST: She is status post bilateral mastectomies without reconstruction. No chest wall mass or concerning lesions identified. Multiple chest telangectasias in the left side.  There are no dominant masses on palpation of the chest wall, along the mastectomy scars or into the axilla bilaterally.   LUNGS: Clear breath sounds bilaterally, no wheezing, no crackles.    CARDIOVASCULAR: S1 and S2 well heard, regular rate and rhythm, no murmur or gallop. JVP absent.    ABDOMEN: Soft, nontender  EXTREMITIES: 1+ bilateral lower extremity edema, symmetric.   SKIN: Telangiectasias noted as above. 0.5 cm    NEUROLOGIC: Normal mental status. Alert and oriented .Cranial nerves II through XII grossly intact.  No focal weakness. Sensory examination grossly intact. She was observed while she was walking and no significant abnormalities noted.       Reviewed dexa scan personally and with patient  ASSESSMENT AND PLAN:  1. Inflammatory breast cancer, stage IIIC, the left breast, ER positive, HER2 negative, status post neoadjuvant chemotherapy followed by bilateral mastectomies. Chest wall radiotherapy completed in 03/2008. She was on adjuvant Arimidex from November 2007 until November 2012, and then was switched to tamoxifen. She completed 10 years of adjuvant hormonal therapy in November 2017.  At that time, she was switched back to arimidex per MA 17 trial.        We discussed that she has had almost fifteen years of endocrine therapy.  She has osteoporosis.  We reviewed her bone density which has actually improved.  GIven how long she has been on endocrine therapy though, I recommended she discontinue her AI.      We reviewed we will also stop zometa.      2. Gait imbalance  : Unchanged.  Normal brain MRI in November 2017.  Continue pilates and PT. Discussed minimizing use of potentially offending medications    3. Osteoporosis.   She had a dexa scan and we reviewed this scan.  Recommended discontinuing zometa at this time.    4. Vaginal dryness - on rephresh      We discussed follow up in one year in survivorship clinic.    Khushbu Louise MD

## 2021-11-08 NOTE — PROGRESS NOTES
Infusion Nursing Note:  Nadira Perez presents today for zometa.    Patient seen by provider today: Yes: Dr. Louise   present during visit today: Not Applicable.    Note: Ok to proceed with infusion. This is patient's last zometa infusion.    TORB: Dr. Louise/Juhi Philippe RN  -orders entered in for zometa today, no future zometa.  Plan reviewed with patient who stated understanding.    Intravenous Access:  Peripheral IV placed.    Treatment Conditions:  Lab Results   Component Value Date    HGB 11.5 (L) 11/05/2021    WBC 8.0 11/05/2021    ANEU 4.8 05/07/2021     11/05/2021      Lab Results   Component Value Date     11/05/2021    POTASSIUM 3.4 11/05/2021    MAG 2.2 06/11/2009    CR 0.70 11/05/2021    RAMBO 9.2 11/05/2021    RAMBO 9.4 11/05/2021    BILITOTAL 0.4 11/05/2021    ALBUMIN 3.7 11/05/2021    ALBUMIN 3.7 11/05/2021    ALT 34 11/05/2021    AST 23 11/05/2021     Results reviewed, labs MET treatment parameters, ok to proceed with treatment.      Post Infusion Assessment:  Patient tolerated infusion without incident.  Blood return noted pre and post infusion.  Site patent and intact, free from redness, edema or discomfort.  No evidence of extravasations.  Access discontinued per protocol.       Discharge Plan:   Patient declined prescription refills.  Discharge instructions reviewed with: Patient.  Patient and/or family verbalized understanding of discharge instructions and all questions answered.  AVS to patient via [employer].   Patient discharged in stable condition accompanied by: self.  Departure Mode: Ambulatory.      Juhi Philippe, RN

## 2021-11-08 NOTE — NURSING NOTE
"Oncology Rooming Note    November 8, 2021 9:14 AM   Nadira Perez is a 71 year old female who presents for:    Chief Complaint   Patient presents with     Oncology Clinic Visit     [facility] RETURN - BREAST CANCER     Initial Vitals: BP (!) 159/94 (BP Location: Right arm, Patient Position: Chair, Cuff Size: Adult Regular)   Pulse 90   Temp 99.8  F (37.7  C) (Oral)   Resp 16   Ht 1.702 m (5' 7.01\")   Wt 75.3 kg (166 lb)   SpO2 99%   BMI 25.99 kg/m   Estimated body mass index is 25.99 kg/m  as calculated from the following:    Height as of this encounter: 1.702 m (5' 7.01\").    Weight as of this encounter: 75.3 kg (166 lb). Body surface area is 1.89 meters squared.  Moderate Pain (4) Comment: Data Unavailable   No LMP recorded. Patient has had a hysterectomy.  Allergies reviewed: Yes  Medications reviewed: Yes    Medications: Medication refills not needed today.  Pharmacy name entered into Beam.:    [facility]  [facility] [id number] [place] [facility] [address]  [address]    Clinical concerns: No new concerns. Dani was notified.      Kumar Sunshine LPN            "

## 2021-11-08 NOTE — PROGRESS NOTES
[facility] CANCER CLINIC  ONCOLOGY FOLLOW-UP VISIT NOTE    PATIENT NAME: Nadira Perez    YOB: 1950  MRN :[id number]  DATE OF VISIT: Nov 8, 2021     REASON FOR VISIT : breast cancer follow up.     HISTORY OF PRESENT ILLNESS : The patient is a 70-year-old woman who in 06/2007 had the onset of left-sided breast discomfort that extended into the axilla. She ultimately did undergo mammogram on 06/13/2007, which identified a mass at the 2:30 position in the left breast. Ultrasound also was notable for some skin and areolar complex thickening. She did undergo biopsy of the mass, and this was consistent with an infiltrating ductal carcinoma that was grade 2. The tumor was ER positive, NV positive, and HER2 negative by FISH. She also had a biopsy of left axillary lymph node versus the tail of the axilla, and this was consistent with metastatic carcinoma. Following the diagnosis the patient did have metastatic staging to include a PET CT scan. This demonstrated increased activity in the left breast as well as the lymph nodes, but was otherwise negative. There was also an MRI of the breast performed, and this demonstrated other lesions of the left breast, but no abnormalities on the right.     She initiated neoadjuvant chemotherapy for her inflammatory breast cancer on 07/13/2007. She received  for 3 cycles through 08/24/2007. This was followed by Taxotere for 3 cycles, which was administered 09/14/2007 through 10/26/2007. Following her chemotherapy she did undergo a left-sided mastectomy with axillary lymph node dissection on 11/20/2007 by Dr. Mauro Mei. The patient did have residual carcinoma with a 1.0 cm tumor as well as associated DCIS. Thirteen of 33 lymph nodes were positive, the largest of which was 0.6 cm of tumor infiltration and included extracapsular extension. The patient also had a right-sided prophylactic mastectomy, which did not demonstrate any noninvasive or  "invasive carcinoma. Just prior to her surgery Ismael was placed on Arimidex (start date 11/15/2007). Following her surgery she did have some issues with left chest wall cellulitis as well as some lymphedema of the left upper extremity. She ultimately did go on to receive radiation therapy under the direction of Dr. Nghia Burch. She received radiation to the left chest wall at a dose of 6440 cGy, to the left supraclavicular fossa at a dose of 5040 cGy, and to the left internal mammary chain at a dose of 5080 cGy. Last dose of radiation was administered on 03/07/2008.     INTERVAL HISTORY:  Nadira is here for routine visit.   Ms. Perez completed 10 years of endocrine therapy with Arimidex and then tamoxifen.  In 11/2017, she then went back on Arimidex for a prolonged extended therapy as per the MA17 trial.      She says she is overall tolerating it well.  She continues to have intermittent arthralgias worse in her knees.  She denies hot flashes or vaginal bleeding. She is using ReFresh for vaginal dryness but has not been recently engaging in intercourse.      She primarily does stretching type exercises.       She retired and now is \"decluttering\" with plans to start a nonprofit.     No fevers or chills, no chest pain or shortness of breath, no nausea.       No new medical problems.  She continues to have intermittent neuropathy.      REVIEW OF SYSTEMS:  Her 10-point review of systems is otherwise negative.       PHYSICAL EXAMINATION:   There were no vitals taken for this visit.  Wt Readings from Last 5 Encounters:   11/04/21 78.6 kg (173 lb 4.8 oz)   10/29/21 75.8 kg (167 lb)   10/29/21 75.8 kg (167 lb)   10/28/21 75.8 kg (167 lb)   10/23/21 75.8 kg (167 lb)     GENERAL : Alert and oriented , not in distress .   HEENT: Normocephalic head.  Anicteric sclerae.No oral ulceration. No nystagmus.    NECK: Supple, no thyromegaly.   LYMPH:  No cervical, supraclavicular, infraclavicular, or axillary " lymphadenopathy.  BREAST: She is status post bilateral mastectomies without reconstruction. No chest wall mass or concerning lesions identified. Multiple chest telangectasias in the left side.  There are no dominant masses on palpation of the chest wall, along the mastectomy scars or into the axilla bilaterally.   LUNGS: Clear breath sounds bilaterally, no wheezing, no crackles.    CARDIOVASCULAR: S1 and S2 well heard, regular rate and rhythm, no murmur or gallop. JVP absent.    ABDOMEN: Soft, nontender  EXTREMITIES: 1+ bilateral lower extremity edema, symmetric.   SKIN: Telangiectasias noted as above. 0.5 cm    NEUROLOGIC: Normal mental status. Alert and oriented .Cranial nerves II through XII grossly intact.  No focal weakness. Sensory examination grossly intact. She was observed while she was walking and no significant abnormalities noted.       Reviewed dexa scan personally and with patient  ASSESSMENT AND PLAN:  1. Inflammatory breast cancer, stage IIIC, the left breast, ER positive, HER2 negative, status post neoadjuvant chemotherapy followed by bilateral mastectomies. Chest wall radiotherapy completed in 03/2008. She was on adjuvant Arimidex from November 2007 until November 2012, and then was switched to tamoxifen. She completed 10 years of adjuvant hormonal therapy in November 2017.  At that time, she was switched back to arimidex per MA 17 trial.        We discussed that she has had almost fifteen years of endocrine therapy.  She has osteoporosis.  We reviewed her bone density which has actually improved.  GIven how long she has been on endocrine therapy though, I recommended she discontinue her AI.      We reviewed we will also stop zometa.      2. Gait imbalance : Unchanged.  Normal brain MRI in November 2017.  Continue pilates and PT. Discussed minimizing use of potentially offending medications    3. Osteoporosis.   She had a dexa scan and we reviewed this scan.  Recommended discontinuing zometa at  this time.    4. Vaginal dryness - on rephresh      We discussed follow up in one year in survivorship clinic.    Khushbu Louise MD

## 2021-11-08 NOTE — PATIENT INSTRUCTIONS
[facility] Triage and after hours / weekends / holidays:  [phone]    Please call the triage or after hours line if you experience a temperature greater than or equal to 100.5, shaking chills, have uncontrolled nausea, vomiting and/or diarrhea, dizziness, shortness of breath, chest pain, bleeding, unexplained bruising, or if you have any other new/concerning symptoms, questions or concerns.      If you are having any concerning symptoms or wish to speak to a provider before your next infusion visit, please call your care coordinator or triage to notify them so we can adequately serve you.     If you need a refill on a narcotic prescription or other medication, please call before your infusion appointment.                 November 2021 Sunday Monday Tuesday Wednesday Thursday Friday Saturday        1     2    RETURN   7:25 AM   (15 min.)   Abe Wilkerson MD   [facility] [place] 3     4    SPINE FOLLOW UP   9:40 AM   (40 min.)   Evie Lancaster PTA   [facility] [place] [facility]    PAC EVAL  12:00 PM   (60 min.)   Loida Mendoza PA-C   [facility]operative [facility] [place] 5    [facility] LAB DRAW   1:00 PM   (15 min.)    [facility] LAB DRAW   [facility]    DX HIP/PELVIS/SPINE   1:30 PM   (30 min.)   [email or web address]   [facility] [place]    DX WRIST/HEEL/RADIUS   1:50 PM   (15 min.)   [id number]   [facility] [place] 6    MR FOOT RIGHT WO  10:15 AM   (45 min.)   [email or web address]   [facility]   7     8    RETURN   8:45 AM   (30 min.)   Khushbu Louise MD   [facility]    ONC INFUSION 1 HR (60 MIN)  10:00 AM   (60 min.)    [facility]  12:45 PM   (30 min.)   [facility] EYE TECH   [facility] 9    SPINE  FOLLOW UP   9:40 AM   (40 min.)   Evie Lancaster PTA   [facility] 10    VIDEO VISIT NEW  10:45 AM   (30 min.)   Becca Moreno MD   [facility] [place] 11    [facility] RETURN HAND   8:45 AM   (10 min.)   Lien Prajapati MD   [facility] Orthopedic [facility]    XR FLUORO TIME 0/1 HOUR   9:00 AM   (60 min.)   [email or web address]   [facility] Orthopedic Xray [place]    [facility] RETURN   9:45 AM   (20 min.)   Rolan Morgan DO   [facility] Sports Medicine [facility]    LYMPHEDEMA EVALUATION  12:45 PM   (75 min.)   Jennifer Jauregui PT   M [facility] 12     13       14     15     16    SPINE FOLLOW UP   9:40 AM   (40 min.)   Evie Lancaster PTA [facility] 17     18    FUSION, SPINE, INTERBODY, OBLIQUE ANTERIOR AND LUMBAR, 2 LEVELS, POSTERIOR APPROACH, USING OPTICAL TRACKING SYSTEM   7:45 AM   Serge Ramirez MD   [facility] OR 19     20       21     22     23     24     25     26     27       28     29     30                                     December 2021 Sunday Monday Tuesday Wednesday Thursday Friday Saturday                  1     2    VESTIBULAR TREATMENT  11:45 AM   (45 min.)   Anuradha Marr PT M [facility] 3     4       5     6     7     8     9    VESTIBULAR TREATMENT  11:45 AM   (45 min.)   Anuradha Marr PT   M [facility] 10     11       12     13     14     15     16     17     18       19     20     21     22     23     24     25       26     27     28     29     30     31                          Lab Results:  No results found for this or any previous visit (from the past 12 hour(s)).

## 2021-11-09 ENCOUNTER — THERAPY VISIT (OUTPATIENT)
Dept: PHYSICAL THERAPY | Facility: CLINIC | Age: 71
End: 2021-11-09
Payer: COMMERCIAL

## 2021-11-09 DIAGNOSIS — M47.817 SPONDYLOSIS WITHOUT MYELOPATHY OR RADICULOPATHY, LUMBOSACRAL REGION: ICD-10-CM

## 2021-11-09 PROCEDURE — 97110 THERAPEUTIC EXERCISES: CPT | Mod: GP | Performed by: PHYSICAL THERAPY ASSISTANT

## 2021-11-09 NOTE — PROGRESS NOTES
Date of Service: Nov 11, 2021    Chief Complaint: Re-check - bilateral radiocarpal arthritis with last injection with Dr. Conley on  10/22/21 requesting right CTS injectionfollow up bilateral radiocarpal arthritis    History of Present Illness:  Nadira Perez is a 71 year old female who presents today in follow-up for bilateral radiocarpal arthritis with pain bilaterally. Patient was last seen by DR gavino Acosta on 05/13/21 for post-traumatic osteoarthritis of left wrist with bilateral fluoroscopy guided radiocarpal steroid injection on 05/13/21 and plan to follow up in 6 months.  Patient states that she could not bear the pain any longer and wait for her scheduled appointment today with Dr. Prajapati, therefore she made an appointment with Dr. Conley.  She was last seen  on 10/22/2021 by Dr. Conley who administered a bilateral intercarpal steroid injection as well as a right carpal tunnel injection.  The patient had an EMG of the bilateral wrists and bilateral lower extremities and October which revealed severe right carpal tunnel syndrome as well as mild left carpal tunnel syndrome.  Patient states that she has had no improvement in her right carpal tunnel symptoms following steroid injection on 10/22 by Dr. Conley.  Patient has a lumbar fusion scheduled with Dr. Ramirez next week.  She presents today requesting an additional right carpal tunnel injection to help her with her symptoms while she is recovering from her lumbar fusion.    The patient did discuss a right open carpal tunnel release with Dr. Conley at her last visit, however elected to do an injection instead given her upcoming spinal surgery.    Patient also has a history of right scaphoid excision and 4 corner fusion with Dr. Mendez.  X-rays obtained last month showed migration and loosening of one of the screws.  Patient would like to have this excised.    Past medical, surgical, and social history were reviewed and updated as needed.    Physical examination:  The  patient is well-developed, well nourished and in on acute distress. The patient is alert and oriented to the surroundings. Behavior is appropriate to the surroundings. Extra-ocular motions are intact. Respirations appear unlabored.     Examination of the right and left upper extremity reveals skin to be clean, dry and intact.  Soft and compressible soft tissue swelling of the dorsal wrist.  This area is nontender.  Significant APB atrophy.  Diminished sensation to light touch throughout the median nerve distribution.  Positive Tinel's, positive carpal tunnel compression. Fingers appear well-perfused with good capillary refill    Assessment:   71 year old female with   1. Bilateral radiocarpal osteoarthritis and history of bilateral scaphoid excisions and 4 corner fusions with Dr. Mendez.  Evidence of screw loosening and possible migration seen on x-ray and October 2021.  Associated soft tissue swelling and pain with activity in this area.  2.  Severe right carpal tunnel syndrome, mild left carpal tunnel syndrome.  Status post right carpal tunnel corticosteroid injection on 10/22/2021 with Dr. Beau Conley.    Plan:    I discussed with the patient that given she just had a steroid injection 2 weeks ago, would not recommend repeating steroid injection today.  In addition given the fact that she has severe carpal tunnel syndrome and did not have any relief from initial injection, would guess that she would not have much improvement from the second 1.  We would recommend surgical treatment in the form of a right open carpal tunnel release in the near future.  Patient had previously discussed scheduling this with Dr. Conley given he has more availability at this time.  Patient also mentioned wanting removal of migrated screw in her wrist.  She would like this done at the same time.  Patient would like to wait a few weeks after her spinal fusion-surgery, and she would like to have this done by the end of the year.  Briefly discussed  surgical procedure and postoperative course.  Will discuss case with Dr. Conley and the patient will contact our  to schedule for sometime in December or early January    LNOG PRECIADO PA-C  11/11/2021 11:38 AM   Orthopaedic Surgery     I have personally examined this patient and have reviewed the clinical presentation and progress note with the resident. I agree with the treatment plan as outlined. The plan was formulated with the PA on the day of the PA's dictation.   Lien Prajapati MD   Hand and Upper Extremity Specialist  [facility]      Scribe Disclosure:  I, Sharonda Terrell, a scribe, prepared the chart for today's encounter.

## 2021-11-10 ENCOUNTER — PRE VISIT (OUTPATIENT)
Dept: VASCULAR SURGERY | Facility: CLINIC | Age: 71
End: 2021-11-10

## 2021-11-10 ENCOUNTER — VIRTUAL VISIT (OUTPATIENT)
Dept: VASCULAR SURGERY | Facility: CLINIC | Age: 71
End: 2021-11-10
Payer: COMMERCIAL

## 2021-11-10 DIAGNOSIS — Z01.818 PRE-OP EXAM: Primary | ICD-10-CM

## 2021-11-10 DIAGNOSIS — M41.50 DEGENERATIVE SCOLIOSIS IN ADULT PATIENT: ICD-10-CM

## 2021-11-10 DIAGNOSIS — M43.16 SPONDYLOLISTHESIS OF LUMBAR REGION: ICD-10-CM

## 2021-11-10 DIAGNOSIS — M79.674 PAIN IN RIGHT TOE(S): Primary | ICD-10-CM

## 2021-11-10 DIAGNOSIS — M48.062 SPINAL STENOSIS OF LUMBAR REGION WITH NEUROGENIC CLAUDICATION: ICD-10-CM

## 2021-11-10 DIAGNOSIS — I72.3 ILIAC ARTERY ANEURYSM (H): ICD-10-CM

## 2021-11-10 PROCEDURE — 99205 OFFICE O/P NEW HI 60 MIN: CPT | Mod: 95 | Performed by: SURGERY

## 2021-11-10 NOTE — PATIENT INSTRUCTIONS
Thank you so much for choosing us for your care. It was a pleasure to see you at the vascular clinic today.     Follow-up recommendations: Dr. Moreno will reach out to you with the results of your ultrasound. There is no need for a follow up visit at this time.    Additional testing/imaging ordered today: Ultrasound of your aorta and iliac arteries in the next 4-6 weeks.      Our scheduling team will get in touch with you to set up any follow-up testing/imaging and/or appointments. Please be aware that any testing/imaging recommended today will need to completed prior to your next visit with the provider. If testing/imaging is not completed prior to your next visit, your visit may be rescheduled.        If you have any questions, please call Maya Chavez RN at [phone]. We also encourage the use of Movista to communicate with your HealthCare Provider.      If you have an urgent need after business hours (8:00 am to 4:30 pm) please call [phone], option 4, and ask for the vascular attending on call. For non-urgent after hours needs, please call the vascular nurse at [phone] and leave a detailed voicemail. For scheduling needs, please call our scheduling line at [phone].

## 2021-11-10 NOTE — NURSING NOTE
Chief Complaint   Patient presents with     Consult     Consult for pre-op prior to spinal surgery.  Pt inquiring on the approach being used.  Pt inquiring on the injection being used.         Medications and allergies reconciled.     Cassia Chau LPN

## 2021-11-10 NOTE — LETTER
11/10/2021      RE: Nadira Perez  [address]  [place] [facility] [address]     Vascular Surgery Consultation Note     Patient:  Nadira Perez   Date of birth 1950, Medical record number [id number]  Date of Visit:  11/10/2021  Consult Requester:No att. 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 Ramirez with spondylolisthesis of L5-S1.   I discussed my role in the procedure and the potential risk for bleeding and injury to nearby structures such as the iliac vein and artery.  I did alert her also to my recognition of ectatic iliac arteries on the coronal views of her CT scan for her spine.  We will arrange an iliac duplex at some point in the coming weeks to gain a true size on the arteries.  They are not frankly aneurysmal and needing evaluation before surgery but a accurate size would be helpful going forward.  She had an opportunity to ask questions and gain a better understanding of her procedure. I look forward to participating in her care with Dr Ramirez.    Many thanks for involving me in the care of this very pleasant patient. Should any questions or concerns arise, please don't hesitate to contact me.    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 Ramirez.  She has no history of deep vein thrombosis.  She has had an abdominal hysterectomy however no other relevant left lower quadrant abdominal surgeries.  She does use a walker more to assist when she has back spasms.  She did states she is planning steroid injections in her wrist prior to surgery given the pain she has had since falling with a trauma.  She denies claudication.      Review of Systems   Constitutional, HEENT, cardiovascular, pulmonary, GI, , musculoskeletal, neuro, skin, endocrine and psych systems are negative, except as otherwise noted.    Physical Exam   GENERAL: Healthy, alert and no distress  EYES: Eyes grossly normal to inspection.  No discharge or erythema, or obvious scleral/conjunctival abnormalities.  RESP: No audible wheeze, cough, or visible cyanosis.  No visible retractions or increased work of breathing.    SKIN: Visible skin clear. No significant rash, abnormal pigmentation or lesions.  NEURO: Cranial nerves grossly intact.  Mentation and speech appropriate for age.  PSYCH: Mentation appears normal, affect normal/bright, judgement and insight intact, normal speech and appearance well-groomed.      CT of her lumbosacral spine does show an ectatic infrarenal abdominal aorta without aneurysmal disease.  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      Nadira is a 71 year old who is being evaluated via a billable video visit.      Amwell visit.    How would you like to obtain your AVS? MyChart  If the video visit is dropped, the invitation should be resent by: Text to cell phone: [phone]  Will anyone else be joining your video visit? No    Video Start Time: 11 AM  Video-Visit Details    Type of service:  Video Visit    Video End Time:11:30 AM    Originating Location (pt. Location): Home    Distant Location (provider location):  [facility] [place]     Platform used for  Video Visit: Harpreet Moreno MD

## 2021-11-10 NOTE — LETTER
11/10/2021       RE: Nadira Perez  [address]  [place] [address]     Dear Colleague,    Thank you for referring your patient, Nadira Perez, to the [facility] [place] at [facility]. Please see a copy of my visit note below.      Vascular Surgery Consultation Note     Patient:  Nadira Perez   Date of birth 1950, Medical record number [id number]  Date of Visit:  11/10/2021  Consult Requester:No att. 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 Ramirez with spondylolisthesis of L5-S1.   I discussed my role in the procedure and the potential risk for bleeding and injury to nearby structures such as the iliac vein and artery.  I did alert her also to my recognition of ectatic iliac arteries on the coronal views of her CT scan for her spine.  We will arrange an iliac duplex at some point in the coming weeks to gain a true size on the arteries.  They are not frankly aneurysmal and needing evaluation before surgery but a accurate size would be helpful going forward.  She had an opportunity to ask questions and gain a better understanding of her procedure. I look forward to participating in her care with Dr Ramirez.    Many thanks for involving me in the care of this very pleasant patient. Should any questions or concerns arise, please don't hesitate to contact me.    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 Ramirez.  She has no history of deep vein thrombosis.  She has had an abdominal hysterectomy however no other relevant left lower quadrant abdominal surgeries.  She does use a walker more to assist when she has back spasms.  She did states she is planning steroid injections in her wrist prior to surgery given the pain she has had since falling with a trauma.  She denies claudication.      Review of Systems   Constitutional, HEENT, cardiovascular, pulmonary, GI, , musculoskeletal, neuro, skin, endocrine and psych systems are negative, except as otherwise noted.    Physical Exam   GENERAL: Healthy, alert and no distress  EYES: Eyes grossly normal to inspection.  No discharge or erythema, or obvious scleral/conjunctival abnormalities.  RESP: No audible wheeze, cough, or visible cyanosis.  No visible retractions or increased work of breathing.    SKIN: Visible skin clear. No significant rash, abnormal pigmentation or lesions.  NEURO: Cranial nerves grossly intact.  Mentation and speech appropriate for age.  PSYCH: Mentation appears normal, affect normal/bright, judgement and insight intact, normal speech and appearance well-groomed.      CT of her lumbosacral spine does show an ectatic infrarenal abdominal aorta without aneurysmal disease.  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      Nadira is a 71 year old who is being evaluated via a billable video visit.      Ammat visit.    How would you like to obtain your AVS? MyChart  If the video visit is dropped, the invitation should be resent by: Text to cell phone: [phone]  Will anyone else be joining your video visit? No    Video Start Time: 11  AM  Video-Visit Details    Type of service:  Video Visit    Video End Time:11:30 AM    Originating Location (pt. Location): Home    Distant Location (provider location):  [facility] [place]     Platform used for Video Visit: RonaldWell        Again, thank you for allowing me to participate in the care of your patient.      Sincerely,    Becca Moreno MD

## 2021-11-11 ENCOUNTER — HOSPITAL ENCOUNTER (OUTPATIENT)
Dept: PHYSICAL THERAPY | Facility: CLINIC | Age: 71
End: 2021-11-11
Attending: INTERNAL MEDICINE
Payer: COMMERCIAL

## 2021-11-11 ENCOUNTER — OFFICE VISIT (OUTPATIENT)
Dept: ORTHOPEDICS | Facility: CLINIC | Age: 71
End: 2021-11-11
Payer: COMMERCIAL

## 2021-11-11 ENCOUNTER — ANCILLARY PROCEDURE (OUTPATIENT)
Dept: GENERAL RADIOLOGY | Facility: CLINIC | Age: 71
End: 2021-11-11
Attending: STUDENT IN AN ORGANIZED HEALTH CARE EDUCATION/TRAINING PROGRAM
Payer: COMMERCIAL

## 2021-11-11 ENCOUNTER — OFFICE VISIT (OUTPATIENT)
Dept: ORTHOPEDICS | Facility: CLINIC | Age: 71
End: 2021-11-11
Payer: OTHER MISCELLANEOUS

## 2021-11-11 VITALS — HEIGHT: 67 IN | WEIGHT: 166 LBS | BODY MASS INDEX: 26.06 KG/M2

## 2021-11-11 DIAGNOSIS — G56.01 RIGHT CARPAL TUNNEL SYNDROME: Primary | ICD-10-CM

## 2021-11-11 DIAGNOSIS — M79.674 PAIN IN RIGHT TOE(S): ICD-10-CM

## 2021-11-11 DIAGNOSIS — I89.0 LYMPHEDEMA OF LEFT UPPER EXTREMITY: Primary | ICD-10-CM

## 2021-11-11 DIAGNOSIS — L90.5 SCAR CONDITION AND FIBROSIS OF SKIN: ICD-10-CM

## 2021-11-11 DIAGNOSIS — G56.03 BILATERAL CARPAL TUNNEL SYNDROME: ICD-10-CM

## 2021-11-11 DIAGNOSIS — C50.919 MALIGNANT NEOPLASM OF FEMALE BREAST, UNSPECIFIED ESTROGEN RECEPTOR STATUS, UNSPECIFIED LATERALITY, UNSPECIFIED SITE OF BREAST (H): ICD-10-CM

## 2021-11-11 DIAGNOSIS — M79.674 PAIN IN RIGHT TOE(S): Primary | ICD-10-CM

## 2021-11-11 DIAGNOSIS — M19.131 POST-TRAUMATIC OSTEOARTHRITIS OF RIGHT WRIST: ICD-10-CM

## 2021-11-11 PROCEDURE — 97140 MANUAL THERAPY 1/> REGIONS: CPT | Mod: GP | Performed by: PHYSICAL THERAPIST

## 2021-11-11 PROCEDURE — 99214 OFFICE O/P EST MOD 30 MIN: CPT | Performed by: ORTHOPAEDIC SURGERY

## 2021-11-11 PROCEDURE — 73630 X-RAY EXAM OF FOOT: CPT | Mod: RT | Performed by: RADIOLOGY

## 2021-11-11 PROCEDURE — 97110 THERAPEUTIC EXERCISES: CPT | Mod: GP | Performed by: PHYSICAL THERAPIST

## 2021-11-11 PROCEDURE — 97162 PT EVAL MOD COMPLEX 30 MIN: CPT | Mod: GP | Performed by: PHYSICAL THERAPIST

## 2021-11-11 PROCEDURE — 99213 OFFICE O/P EST LOW 20 MIN: CPT | Performed by: STUDENT IN AN ORGANIZED HEALTH CARE EDUCATION/TRAINING PROGRAM

## 2021-11-11 ASSESSMENT — MIFFLIN-ST. JEOR: SCORE: 1310.6

## 2021-11-11 NOTE — LETTER
11/11/2021      RE: Nadira Perez  [address]  [facility] [address]       [facility]  Sports Medicine Clinic  Clinics and Surgery Center           SUBJECTIVE       Nadira Perez is a 71 year old female presenting to clinic today for a f/u of her right toe concerns, as well as to discuss her MRI.     10/29/21: Pain in right toe(s)  -     MR Foot Right w/o Contrast; Future     71-year-old female with lower extremity peripheral polyneuropathy with questionable fracture of the second middle phalanx.  X-rays have been obtained and read was negative for fracture in the area.  I am overall concerned that her sensation is down and she has extensive ecchymosis that this would either be a fracture within the joint articulation or extensor tendon disruption even the mechanics of her fall.     Plan: At this time I recommend that she continue buddy taping for comfort.  Should order and have ordered an MRI of her foot for further evaluation and delineation since her sensation is at baseline diminished but compression of the joint leads to pain with persistent ecchymosis in the area.  I would like her to follow-up after MRI has been obtained for further discussion of management.  Weight-bear as tolerated.      Interval hx: Nadira is overall doing well.  Still having sensory deficit loss in the bilateral feet due to her polyneuropathy, but the buddy taping has helped with her function.  She does not feel as though the toe is catching anymore.  No pain.  Her swelling and ecchymosis is gone down tremendously as well.  Also here to discuss her most recent MRI findings.    PMH, Medications and Allergies were reviewed and updated as needed.    ROS:  As noted above otherwise negative.    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     Corneal opacity     Hypothyroidism     Osteopenia, unspecified location     Aromatase inhibitor use     Chronic pain of right knee     DDD (degenerative disc disease), lumbar     Degenerative scoliosis in adult patient     Spondylosis without myelopathy or radiculopathy, lumbosacral region     Right carpal tunnel syndrome     Peripheral neuropathy       Current Outpatient Medications   Medication Sig Dispense Refill     Acetaminophen (APAP 500 PO) Take 1,000 mg by mouth 3 times daily        amLODIPine (NORVASC) 10 MG tablet Take 1 tablet (10 mg) by mouth daily (Patient taking differently: Take 10 mg by mouth every 10 days Takes around 10 AM.) 90 tablet 3     Ascorbic Acid (VITAMIN C) 500 MG CHEW Take 1 tablet by mouth daily       atorvastatin (LIPITOR) 80 MG tablet Take 1 tablet (80 mg) by mouth daily (Patient taking differently: Take 80 mg by mouth every evening ) 90 tablet 3     Cholecalciferol (VITAMIN D3) 50 MCG (2000 UT) CAPS Take 6,000 Units by mouth daily (Patient taking differently: Take 6,000 Units by mouth daily (with lunch) ) 270 capsule 3     CRANBERRY EXTRACT PO Take 650 mg by mouth daily ~10 am       cyclobenzaprine (FLEXERIL) 10 MG tablet Take 1 tablet (10 mg) by mouth 3 times daily as needed for muscle spasms 40 tablet 3     diclofenac (VOLTAREN) 1 % topical gel Apply 4 grams to knees or 2 grams to hands four times daily using enclosed dosing card. (Patient taking differently: Apply 4 grams to knees or 2 grams to hands four times daily as needed using enclosed dosing card.) 100 g 1     gabapentin (NEURONTIN) 300 MG capsule Take 1-2 capsules by mouth every 8 hours  (Patient taking differently: Take 600 mg by mouth 3 times daily ) 540 capsule 3     HYDROcodone-acetaminophen (NORCO) 5-325 MG tablet Take 1 tablet by mouth every 6 hours as needed for severe pain 40 tablet 0     ibuprofen (ADVIL/MOTRIN) 200 MG tablet Take 600 mg by mouth 3 times daily        levothyroxine (SYNTHROID/LEVOTHROID) 112 MCG tablet TAKE 1/2 TABLET BY MOUTH DAILY 45 tablet 3     lisinopril (ZESTRIL) 40 MG tablet Take 1 tablet (40 mg) by mouth daily (Patient taking differently: Take 20 mg by mouth 2 times daily Takes 1/2 tablet at 2 PM and the other 1/2 tablet around 10 PM) 90 tablet 3     melatonin 5 MG tablet Take 10 mg by mouth At Bedtime        metoprolol succinate ER (TOPROL-XL) 50 MG 24 hr tablet Take 1 tablet (50 mg) by mouth daily (Patient taking differently: Take 50 mg by mouth every morning ) 90 tablet 3     Multiple Vitamin (MULTI-VITAMIN) per tablet Take 1 tablet by mouth daily (with lunch)        ondansetron (ZOFRAN-ODT) 4 MG ODT tab Take 1 tablet (4 mg) by mouth every 12 hours as needed for nausea 180 tablet 3     potassium chloride ER (KLOR-CON M) 20 MEQ CR tablet Take 20 meq three times daily. (Patient taking differently: Take 20 mEq by mouth 3 times daily Take 20 meq three times daily.) 90 tablet 3     terbinafine (LAMISIL AT) 1 % external cream Apply topically 2 times daily For fungal infection not resolved with other antifungals (e.g. Clotrimazole) (Patient taking differently: Apply topically 2 times daily as needed (toenail fungus) For fungal infection not resolved with other antifungals (e.g. Clotrimazole)) 24 g 11     tiZANidine (ZANAFLEX) 2 MG tablet Take 1-2 tablets (2-4 mg) by mouth 3 times daily as needed for muscle spasms (Patient taking differently: Take 2-4 mg by mouth 3 times daily as needed for muscle spasms Uses this or flexeril - prefers tizanidine.) 90 tablet 1     triamcinolone (KENALOG) 0.1 % external ointment Apply topically 2 times daily (Patient taking differently:  "Apply topically 2 times daily as needed ) 30 g 11     triamterene-HCTZ (MAXZIDE-25) 37.5-25 MG tablet Take 1 tablet by mouth daily 90 tablet 3     Vaginal Lubricant (REPHRESH) GEL Place 2 g vaginally every 3 days 2 g 3            OBJECTIVE:       Vitals:   Vitals:    11/11/21 1000   Weight: 75.3 kg (166 lb)   Height: 1.702 m (5' 7\")     BMI: Body mass index is 26 kg/m .    Gen:  Well nourished and in no acute distress  HEENT: Extraocular movement intact  Neck: Supple  Pulm:  Breathing Comfortably. No increased respiratory effort.  Psych: Euthymic. Appropriately answers questions    MSK: Evaluation of right foot with extensive resolution of the previously noted ecchymosis and edema about the dorsum of the second phalanx.  No pain or tenderness with range of motion or strength testing, but this is also her baseline because of the polyneuropathy.  Ambulation has returned to somewhat normal at the patient's baseline.    Study Result    Narrative & Impression   MR right foot without  contrast 11/6/2021 11:40 AM     History: Pain in right toe(s).     Additional History from EMR: Unknown etiology presenting with acute  (2-day) history of right foot, second toe pain and extensive  ecchymosis     Techniques: Multiplanar multisequence imaging of the right foot was  obtained without  administration of intravenous contrast.     Comparison: 10/29/2021     Findings:     Motion partially compromising assessment.     A dorsal external marker is placed at the level of first  interphalangeal and second proximal interphalangeal joints.     Bones     Corresponding to the marker, extensive opposing edema-like marrow  signal intensity involving the second proximal interphalangeal joint  with associated osteophyte. There is relatively more disproportional  amount of edema in the middle phalanx. No fracture line identified.     Areas of subchondral edema-like marrow signal intensity including  second metatarsophalangeal joint, dorsal " aspect of the third  metatarsal head are presumably degenerative.     Hammertoe flexion deformity of the third toe is present.      Mild contour abnormality with subcortical edema like marrow signal  intensity for of the first metatarsal head medial eminence, presumably  degenerative.  Bony proliferation at the anterior process of calcaneus, likely from  remote trauma.     Joints and periarticular soft tissue     Joint effusion: Physiologic amount of joint fluid are present.     Plantar plates: Intersesamoidal ligament and sesamoidal phalangeal  ligaments of the first metatarsophalangeal joints are grossly intact.  Plantar plates of the second through fifth toe at metatarsophalangeal  joints are grossly intact.     Intermetatarsal spaces: Small amount of fluid in the second  interspace.     Ligaments and Tendons     Lisfranc interosseous ligament: Intact.     Tendons: The visualized courses of flexor and extensor tendons are  grossly intact.      Muscles     Severe fatty infiltration/replacement with diffuse muscle edema, most  consistent with microangiopathy/polyneuropathy.     ANCILLARY FINDINGS     Lisfranc interosseous ligament is intact. Mild dorsal subcutaneous  edema.                                                                      Impression:  Motion partially compromising assessment.  1. Corresponding to the external marker, extensive opposing edema at  the second proximal interphalangeal joint. Although these changes may  be related to severe degenerative change, given the acute onset with  ecchymosis and relatively disproportionate edema on the middle  phalanx, superimposed bone contusion is high in differential. No  definite fracture line identified but motion and relative small area  compromise subtle fracture line/displacement assessment by current MR.  Follow up radiographs for evaluation of developing callus may be  helpful.               ASSESSMENT and PLAN:     Nadira was seen today for  recheck.    Diagnoses and all orders for this visit:    Pain in right toe(s)    Nadira returns to clinic today for follow-up of her right second toe pain and ecchymosis.  MRI was done which showed extensive edema of the second proximal interphalangeal joint which could be from degenerative changes.  Also concern for possible fracture line and contusion but not fully determined on MRI.  However we have been treating the patient for a distal phalanx fracture with buddy taping and protective shoes.  She is improved dramatically.    Plan: I think Nadira would benefit from another week or so of continued buddy taping.  She has a few surgeries coming up and has elected to continue her buddy taping throughout the end of this year as it is providing her stability of her third toe and not allowing that to hang and cause her to have imbalance.  I am fine with this is a low risk of buddy taping for longer than needed is extremely low in terms of adverse effects.  Nadira can follow-up with us in clinic as needed but I think she will overall continue to do well and progress to her upcoming surgeries for her back and other areas of her body.      Options for treatment and/or follow-up care were reviewed with the patient was actively involved in the decision making process. Patient verbalized understanding and was in agreement with the plan.    Rolan Morgan DO  , Sports Medicine  Department of Family Medicine and [facility]

## 2021-11-11 NOTE — LETTER
11/11/2021         RE: Nadira Perez  [address]  [facility] [address]        Dear Colleague,    Thank you for referring your patient, Nadira Perez, to the [facility] [place]. Please see a copy of my visit note below.    Date of Service: Nov 11, 2021    Chief Complaint: Re-check - bilateral radiocarpal arthritis with last injection with Dr. Conley on  10/22/21 requesting right CTS injectionfollow up bilateral radiocarpal arthritis    History of Present Illness:  Nadira Perez is a 71 year old female who presents today in follow-up for bilateral radiocarpal arthritis with pain bilaterally. Patient was last seen by DR gavino Acosta on 05/13/21 for post-traumatic osteoarthritis of left wrist with bilateral fluoroscopy guided radiocarpal steroid injection on 05/13/21 and plan to follow up in 6 months.  Patient states that she could not bear the pain any longer and wait for her scheduled appointment today with Dr. Prajapati, therefore she made an appointment with Dr. Conley.  She was last seen  on 10/22/2021 by Dr. Conley who administered a bilateral intercarpal steroid injection as well as a right carpal tunnel injection.  The patient had an EMG of the bilateral wrists and bilateral lower extremities and October which revealed severe right carpal tunnel syndrome as well as mild left carpal tunnel syndrome.  Patient states that she has had no improvement in her right carpal tunnel symptoms following steroid injection on 10/22 by Dr. Conley.  Patient has a lumbar fusion scheduled with Dr. Ramirez next week.  She presents today requesting an additional right carpal tunnel injection to help her with her symptoms while she is recovering from her lumbar fusion.    The patient did discuss a right open carpal tunnel release with Dr. Conley at her last visit, however elected to do an injection instead given her upcoming spinal surgery.    Patient also has a history of right scaphoid excision  and 4 corner fusion with Dr. Mendez.  X-rays obtained last month showed migration and loosening of one of the screws.  Patient would like to have this excised.    Past medical, surgical, and social history were reviewed and updated as needed.    Physical examination:  The patient is well-developed, well nourished and in on acute distress. The patient is alert and oriented to the surroundings. Behavior is appropriate to the surroundings. Extra-ocular motions are intact. Respirations appear unlabored.     Examination of the right and left upper extremity reveals skin to be clean, dry and intact.  Soft and compressible soft tissue swelling of the dorsal wrist.  This area is nontender.  Significant APB atrophy.  Diminished sensation to light touch throughout the median nerve distribution.  Positive Tinel's, positive carpal tunnel compression. Fingers appear well-perfused with good capillary refill    Assessment:   71 year old female with   1. Bilateral radiocarpal osteoarthritis and history of bilateral scaphoid excisions and 4 corner fusions with Dr. Mendez.  Evidence of screw loosening and possible migration seen on x-ray and October 2021.  Associated soft tissue swelling and pain with activity in this area.  2.  Severe right carpal tunnel syndrome, mild left carpal tunnel syndrome.  Status post right carpal tunnel corticosteroid injection on 10/22/2021 with Dr. Beau Conley.    Plan:    I discussed with the patient that given she just had a steroid injection 2 weeks ago, would not recommend repeating steroid injection today.  In addition given the fact that she has severe carpal tunnel syndrome and did not have any relief from initial injection, would guess that she would not have much improvement from the second 1.  We would recommend surgical treatment in the form of a right open carpal tunnel release in the near future.  Patient had previously discussed scheduling this with Dr. Conley given he has more availability at this  time.  Patient also mentioned wanting removal of migrated screw in her wrist.  She would like this done at the same time.  Patient would like to wait a few weeks after her spinal fusion-surgery, and she would like to have this done by the end of the year.  Briefly discussed surgical procedure and postoperative course.  Will discuss case with Dr. Conley and the patient will contact our  to schedule for sometime in December or early January    LONG PRECIADO PA-C  11/11/2021 11:38 AM   Orthopaedic Surgery     I have personally examined this patient and have reviewed the clinical presentation and progress note with the resident. I agree with the treatment plan as outlined. The plan was formulated with the PA on the day of the PA's dictation.   Lien Prajapati MD   Hand and Upper Extremity Specialist  [facility]      Scribe Disclosure:  I, Sharonda Terrell, a scribe, prepared the chart for today's encounter.

## 2021-11-11 NOTE — PROGRESS NOTES
11/11/21 1200   Quick Adds   Quick Adds Certification   Rehab Discipline   Discipline PT   Type of Visit   Type of visit Initial Edema Evaluation   General Information   Start of care 11/11/21   Referring physician Khushbu Louise MD   Orders Evaluate and treat as indicated   Order date 10/07/21   Medical diagnosis lymphedema; malignant neoplasm of breast   Onset of illness / date of surgery 10/07/21  (date of order; 2007 DX L breast cancer B mastectomies, L LND)   Affected body parts LUE;Trunk   Edema etiology Cancer with lymph node dissection;Radiation;Chemo;Surgery;Infection   Location - Cancer with lymph node dissection L breast cancerr s/p L mastectomy and ALND +13/33 and R prophylactic mastectomy 11/20/2007   Location - Radiation completed 2008   Edema etiology comments h/o CLT with most recent in 2018 HP consisting of GCB, self MLD, pump, exs, compression sleeve, gauntlet, velcro compression   Pertinent history of current problem (PT: include personal factors and/or comorbidities that impact the POC; OT: include additional occupational profile info) chronicity, scheduled for lumbar fusion 11/18/2021; multiple comorbidities including L breast cancer and L chest wall cellulitis, sever osteopenia, s/p resection of R parathyroid adenoma and partial thyroid resection 10/2008 complicated by recurrent laryngeal n injury, h/o perineal squmaous carcinoma in situ as well as hyperplasia at the vagina in 1980, h/o R unilateral salpingoophorectomy d/t endoetriosis in early 19990's BALWINDER with LSO in 2000, h/o R wrist surgery d/t trauma 2005 and L wrist surgery d/t trauma 2/2013; stating also with LE neuropahties   Surgical / medical history reviewed Yes   Prior treatment Complete decongestive therapy;Compression garments;Exercise;Compression pump;MLD;Gradient compression bandaging   Patient role / employment history Retired   Living environment House / townhome   Living environment comments able to live on main floor;  " and part time roomate   Current assistive devices 4 wheeled walker   General observations stationary bike, [employer];  at [facility]; has pump but very limited use, not using compression sleeves since 2018 as unable to don, has been using velcro compression but recent difficulty donning d/t wrist pain; has performed GCB in past but has not recentlly does perform self lmphatic drainage   Fall Risk Screen   Fall screen completed by PT   Have you fallen 2 or more times in the past year? Yes   Have you fallen and had an injury in the past year? No   Is patient a fall risk? Yes;Department fall risk interventions implemented   Fall screen comments seen by vestibular therapy to address balance   Abuse Screen (yes response referral indicated)   Feels Unsafe at Home or Work/School no   Feels Threatened by Someone no   Does Anyone Try to Keep You From Having Contact with Others or Doing Things Outside Your Home? no   Physical Signs of Abuse Present no   System Outcome Measures   Outcome Measures Lymphedema   Lymphedema Life Impact Scale (score range 0-72). A higher score indicates greater impairment. 7   Subjective Report   Patient report of symptoms pt acknowledges limited lymphedema home program management due to other health issues and noting increased edema L UE and concerned about worsneing symptoms following back surgery next week   Patient / Family Goals   Patient / family goals statement to get back on track for lymphedema    Pain   Pain comments Back, B wrists L>R   Vitals Signs   Weight 166; 5'7\"   BMI 26   Cognitive Status   Orientation Orientation to person, place and time   Level of consciousness Alert   Follows commands and answers questions 100% of the time   Personal safety and judgement Intact   Edema Exam / Assessment   Skin condition Non-pitting;Intact   Skin condition comments demonstrates increased nonpitting rubbery edema throughout L UE and lateral trunk wall   Capillary refill Symmetrical "   Stemmer sign Negative   Girth Measurements   Girth Measurements Refer to separate girth measurement flowsheet   Volume UE   Right UE (mL) 1816   Left UE (mL) 2128   UE volume comparison LUE volume greater than RUE volume   % difference 17%   Range of Motion   ROM comments seated shoulder flexion L=155; R=150   Strength   Strength comments generally WFL but noting limited by B wrist and R CT dysfunction and LBP   Posture   Posture Forward head position;Protracted shoulders;Kyphosis   Gait / Locomotion   Gait / Locomotion uses 4ww   Sensory   Sensory perception comments intact to light touch   Planned Edema Interventions   Planned edema interventions Manual lymph drainage;Gradient compression bandaging;Fit for compression garment;Exercises;Precautions to prevent infection / exacerbation;Education;Manual therapy;ADL training;Skin care / precautions;Scar mobilization;Soft tissue mobilization;Myofascial release;Home management program development   Planned edema intervention comments focus on upgrading current HEP due to other comorbidities limiting status   Clinical Impression   Criteria for skilled therapeutic intervention met Yes   Therapy diagnosis lymphedema, fibrosis   Influenced by the following impairments / conditions Edema;Stage 2   Functional limitations due to impairments / conditions elevated risk of infections; decreased fit of clothing   Clinical Presentation Evolving/Changing   Clinical Presentation Rationale reporting worsening edema and increased difficulty with previous HEP   Clinical Decision Making (Complexity) Moderate complexity   Treatment Frequency Other (see comments)  (10 sessions over 3 months if pt opting for intensive CLT)   Treatment duration 3 months   Patient / family and/or staff in agreement with plan of care Yes   Risks and benefits of therapy have been explained Yes   Clinical impression comments pt with chronic L UE/UQ lymphedema and fibrosis with worsneing edema due to other  comorbidities limiting ability to perform previous lymphedema HEP and would benefit form therapy to achieve stated goals   Education Assessment   Preferred learning style Listening;Reading;Demonstration   Barriers to learning No barriers   Goal 1   Goal identifier Home program   Goal description Pt will be independent in upgraded HEP for chronic L UE/UQ lymphedema   Target date 02/09/22   Goal 2   Goal identifier volume L UE   Goal description Decrease volume L UE by 5% to demonstrate decreased risk of infection   Target date 01/10/22   Goal 3   Goal identifier LLIS   Goal description Decrease score of LLIS by 3 points to demonstrate decrease impact of lyphedmea on function   Target date 02/09/22   Total Evaluation Time   PT Eval, Moderate Complexity Minutes (02804) 30   Certification   Certification date from 11/11/21   Certification date to 02/09/22   Medical Diagnosis lymphedema, malignant neoplasm of breast   Certification I certify the need for these services furnished under this plan of treatment and while under my care.  (Physician co-signature of this document indicates review and certification of the therapy plan).

## 2021-11-11 NOTE — NURSING NOTE
Teaching Flowsheet   Surgery with Dr Rolan Conley, not yet scheduled.  This is a work comp case.  Relevant Diagnosis: Right carpal tunnel syndrome and screw removal.  Teaching Topic: Right open carpal tunnel release and hardware (screw) removal.    [facility] [place] or [place] block with Dr Rolan Conley.    Original surgery order 10-15-21 for Rt Carpal tunnel release mentioned having surgery at the [place] location on 10-26-21 but this did not happen, patient is agreeable to either site.    We do not have the surgery scheduler [place] at [place] [facility] [place]  Instructed patient to speak with Dr Conley's [place] scheduler [place] [facility] [place]  Phone number provided, patient agreeable.     Person(s) involved in teaching:   Patient     Motivation Level:  Asks Questions: Yes  Eager to Learn: Yes  Cooperative: Yes  Receptive (willing/able to accept information): Yes  Any cultural factors/Jewish beliefs that may influence understanding or compliance? No    Patient demonstrates understanding of the following:  Reason for the appointment, diagnosis and treatment plan: Yes  Knowledge of proper use of medications and conditions for which they are ordered (with special attention to potential side effects or drug interactions): Yes  Which situations necessitate calling provider and whom to contact: Yes    Teaching Concerns Addressed:   Proper use and care of  (medical equip, care aids, etc.): Yes  Nutritional needs and diet plan: Yes  Pain management techniques: Yes  Wound Care: Yes  How and/when to access community resources: Yes     Instructional Materials Used/Given:   Preoperative teaching packet, antibacterial soap.  Christina Cox RN

## 2021-11-11 NOTE — PROGRESS NOTES
[facility]  Sports Medicine Clinic  Clinics and Surgery Center           SUBJECTIVE       Nadira Perez is a 71 year old female presenting to clinic today for a f/u of her right toe concerns, as well as to discuss her MRI.     10/29/21: Pain in right toe(s)  -     MR Foot Right w/o Contrast; Future     71-year-old female with lower extremity peripheral polyneuropathy with questionable fracture of the second middle phalanx.  X-rays have been obtained and read was negative for fracture in the area.  I am overall concerned that her sensation is down and she has extensive ecchymosis that this would either be a fracture within the joint articulation or extensor tendon disruption even the mechanics of her fall.     Plan: At this time I recommend that she continue buddy taping for comfort.  Should order and have ordered an MRI of her foot for further evaluation and delineation since her sensation is at baseline diminished but compression of the joint leads to pain with persistent ecchymosis in the area.  I would like her to follow-up after MRI has been obtained for further discussion of management.  Weight-bear as tolerated.      Interval hx: Nadira is overall doing well.  Still having sensory deficit loss in the bilateral feet due to her polyneuropathy, but the buddy taping has helped with her function.  She does not feel as though the toe is catching anymore.  No pain.  Her swelling and ecchymosis is gone down tremendously as well.  Also here to discuss her most recent MRI findings.    PMH, Medications and Allergies were reviewed and updated as needed.    ROS:  As noted above otherwise negative.    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     Corneal opacity     Hypothyroidism     Osteopenia, unspecified location     Aromatase inhibitor use     Chronic pain of right knee     DDD (degenerative disc disease), lumbar     Degenerative scoliosis in adult patient     Spondylosis without myelopathy or radiculopathy, lumbosacral region     Right carpal tunnel syndrome     Peripheral neuropathy       Current Outpatient Medications   Medication Sig Dispense Refill     Acetaminophen (APAP 500 PO) Take 1,000 mg by mouth 3 times daily        amLODIPine (NORVASC) 10 MG tablet Take 1 tablet (10 mg) by mouth daily (Patient taking differently: Take 10 mg by mouth every 10 days Takes around 10 AM.) 90 tablet 3     Ascorbic Acid (VITAMIN C) 500 MG CHEW Take 1 tablet by mouth daily       atorvastatin (LIPITOR) 80 MG tablet Take 1 tablet (80 mg) by mouth daily (Patient taking differently: Take 80 mg by mouth every evening ) 90 tablet 3     Cholecalciferol (VITAMIN D3) 50 MCG (2000 UT) CAPS Take 6,000 Units by mouth daily (Patient taking differently: Take 6,000 Units by mouth daily (with lunch) ) 270 capsule 3     CRANBERRY EXTRACT PO Take 650 mg by mouth daily ~10 am       cyclobenzaprine (FLEXERIL) 10 MG tablet Take 1 tablet (10 mg) by mouth 3 times daily as needed for muscle spasms 40 tablet 3     diclofenac (VOLTAREN) 1 % topical gel Apply 4 grams to knees or 2 grams to hands four times daily using enclosed dosing card. (Patient taking differently: Apply 4 grams to knees or 2 grams to hands four times daily as needed using enclosed dosing card.) 100 g 1     gabapentin (NEURONTIN) 300 MG capsule Take 1-2 capsules by mouth every 8 hours (Patient taking differently: Take 600 mg by mouth 3 times daily ) 540 capsule 3      HYDROcodone-acetaminophen (NORCO) 5-325 MG tablet Take 1 tablet by mouth every 6 hours as needed for severe pain 40 tablet 0     ibuprofen (ADVIL/MOTRIN) 200 MG tablet Take 600 mg by mouth 3 times daily        levothyroxine (SYNTHROID/LEVOTHROID) 112 MCG tablet TAKE 1/2 TABLET BY MOUTH DAILY 45 tablet 3     lisinopril (ZESTRIL) 40 MG tablet Take 1 tablet (40 mg) by mouth daily (Patient taking differently: Take 20 mg by mouth 2 times daily Takes 1/2 tablet at 2 PM and the other 1/2 tablet around 10 PM) 90 tablet 3     melatonin 5 MG tablet Take 10 mg by mouth At Bedtime        metoprolol succinate ER (TOPROL-XL) 50 MG 24 hr tablet Take 1 tablet (50 mg) by mouth daily (Patient taking differently: Take 50 mg by mouth every morning ) 90 tablet 3     Multiple Vitamin (MULTI-VITAMIN) per tablet Take 1 tablet by mouth daily (with lunch)        ondansetron (ZOFRAN-ODT) 4 MG ODT tab Take 1 tablet (4 mg) by mouth every 12 hours as needed for nausea 180 tablet 3     potassium chloride ER (KLOR-CON M) 20 MEQ CR tablet Take 20 meq three times daily. (Patient taking differently: Take 20 mEq by mouth 3 times daily Take 20 meq three times daily.) 90 tablet 3     terbinafine (LAMISIL AT) 1 % external cream Apply topically 2 times daily For fungal infection not resolved with other antifungals (e.g. Clotrimazole) (Patient taking differently: Apply topically 2 times daily as needed (toenail fungus) For fungal infection not resolved with other antifungals (e.g. Clotrimazole)) 24 g 11     tiZANidine (ZANAFLEX) 2 MG tablet Take 1-2 tablets (2-4 mg) by mouth 3 times daily as needed for muscle spasms (Patient taking differently: Take 2-4 mg by mouth 3 times daily as needed for muscle spasms Uses this or flexeril - prefers tizanidine.) 90 tablet 1     triamcinolone (KENALOG) 0.1 % external ointment Apply topically 2 times daily (Patient taking differently: Apply topically 2 times daily as needed ) 30 g 11     triamterene-HCTZ (MAXZIDE-25)  "37.5-25 MG tablet Take 1 tablet by mouth daily 90 tablet 3     Vaginal Lubricant (REPHRESH) GEL Place 2 g vaginally every 3 days 2 g 3            OBJECTIVE:       Vitals:   Vitals:    11/11/21 1000   Weight: 75.3 kg (166 lb)   Height: 1.702 m (5' 7\")     BMI: Body mass index is 26 kg/m .    Gen:  Well nourished and in no acute distress  HEENT: Extraocular movement intact  Neck: Supple  Pulm:  Breathing Comfortably. No increased respiratory effort.  Psych: Euthymic. Appropriately answers questions    MSK: Evaluation of right foot with extensive resolution of the previously noted ecchymosis and edema about the dorsum of the second phalanx.  No pain or tenderness with range of motion or strength testing, but this is also her baseline because of the polyneuropathy.  Ambulation has returned to somewhat normal at the patient's baseline.    Study Result    Narrative & Impression   MR right foot without  contrast 11/6/2021 11:40 AM     History: Pain in right toe(s).     Additional History from EMR: Unknown etiology presenting with acute  (2-day) history of right foot, second toe pain and extensive  ecchymosis     Techniques: Multiplanar multisequence imaging of the right foot was  obtained without  administration of intravenous contrast.     Comparison: 10/29/2021     Findings:     Motion partially compromising assessment.     A dorsal external marker is placed at the level of first  interphalangeal and second proximal interphalangeal joints.     Bones     Corresponding to the marker, extensive opposing edema-like marrow  signal intensity involving the second proximal interphalangeal joint  with associated osteophyte. There is relatively more disproportional  amount of edema in the middle phalanx. No fracture line identified.     Areas of subchondral edema-like marrow signal intensity including  second metatarsophalangeal joint, dorsal aspect of the third  metatarsal head are presumably degenerative.     Hammertoe flexion " deformity of the third toe is present.      Mild contour abnormality with subcortical edema like marrow signal  intensity for of the first metatarsal head medial eminence, presumably  degenerative.  Bony proliferation at the anterior process of calcaneus, likely from  remote trauma.     Joints and periarticular soft tissue     Joint effusion: Physiologic amount of joint fluid are present.     Plantar plates: Intersesamoidal ligament and sesamoidal phalangeal  ligaments of the first metatarsophalangeal joints are grossly intact.  Plantar plates of the second through fifth toe at metatarsophalangeal  joints are grossly intact.     Intermetatarsal spaces: Small amount of fluid in the second  interspace.     Ligaments and Tendons     Lisfranc interosseous ligament: Intact.     Tendons: The visualized courses of flexor and extensor tendons are  grossly intact.      Muscles     Severe fatty infiltration/replacement with diffuse muscle edema, most  consistent with microangiopathy/polyneuropathy.     ANCILLARY FINDINGS     Lisfranc interosseous ligament is intact. Mild dorsal subcutaneous  edema.                                                                      Impression:  Motion partially compromising assessment.  1. Corresponding to the external marker, extensive opposing edema at  the second proximal interphalangeal joint. Although these changes may  be related to severe degenerative change, given the acute onset with  ecchymosis and relatively disproportionate edema on the middle  phalanx, superimposed bone contusion is high in differential. No  definite fracture line identified but motion and relative small area  compromise subtle fracture line/displacement assessment by current MR.  Follow up radiographs for evaluation of developing callus may be  helpful.               ASSESSMENT and PLAN:     Nadira was seen today for recheck.    Diagnoses and all orders for this visit:    Pain in right toe(s)    Nadira  returns to clinic today for follow-up of her right second toe pain and ecchymosis.  MRI was done which showed extensive edema of the second proximal interphalangeal joint which could be from degenerative changes.  Also concern for possible fracture line and contusion but not fully determined on MRI.  However we have been treating the patient for a distal phalanx fracture with buddy taping and protective shoes.  She is improved dramatically.    Plan: I think Nadira would benefit from another week or so of continued buddy taping.  She has a few surgeries coming up and has elected to continue her buddy taping throughout the end of this year as it is providing her stability of her third toe and not allowing that to hang and cause her to have imbalance.  I am fine with this is a low risk of buddy taping for longer than needed is extremely low in terms of adverse effects.  Nadira can follow-up with us in clinic as needed but I think she will overall continue to do well and progress to her upcoming surgeries for her back and other areas of her body.      Options for treatment and/or follow-up care were reviewed with the patient was actively involved in the decision making process. Patient verbalized understanding and was in agreement with the plan.    Rolan Morgan DO  , Sports Medicine  Department of Family Medicine and [facility]

## 2021-11-11 NOTE — NURSING NOTE
Reason For Visit:   Chief Complaint   Patient presents with     RECHECK     bilateral wrist pain , upcoming back surgery, last injection jairo Landaverde 10/22/21, requesting right CTS injection        Primary MD: Matilde Quiñonez  Ref. MD: terrell     Age: 71 year old    ?  No      There were no vitals taken for this visit.      Pain Assessment  Patient Currently in Pain: Yes  0-10 Pain Scale: 6    Hand Dominance Evaluation  Hand Dominance: Right      force  R hand pincher force: 3.175 kg (7 lb)  R hand  level 2 force: 9.072 kg (20 lb) (with pain)  L hand pincher force: 3.629 kg (8 lb)  L hand  level  2 force: 11.3 kg (25 lb)    QuickDASH Assessment  QuickDA Main 12/22/2016   1.Open a tight or new jar. Severe difficulty   2. Do heavy household chores (e.g., wash walls, floors) Moderate difficulty   3. Carry a shopping bag or briefcase. Moderate difficulty   4. Wash your back. Mild difficulty   5. Use a knife to cut food. Mild difficulty   6. Recreational activities in which you take some force or impact through your arm, shoulder or hand (e.g., golf, hammering, tennis, etc.). Unable   7. During the past week, to what extent has your arm, shoulder or hand problem interfered with your normal social activities with family, friends, neighbours or groups? Slightly   8. During the past week, were you limited in your work or other regular daily activities as a result of your arm, shoulder or hand problem? Slightly limited   9. Arm, shoulder or hand pain. Moderate   10.Tingling (pins and needles) in your arm,shoulder or hand. None   11. During the past week, how much difficulty have you had sleeping because of the pain in your arm, shoulder or hand? ([place] number) No difficulty   Quickdash Ability Score 38.63   1. Open a tight or new jar. -   2. Do heavy household chores (e.g., wash walls, floors). -   3. Carry a shopping bag or briefcase. -   4. Wash your back. -   5. Use a knife to cut food. -   6.  Recreational activities in which you take some force or impact through your arm, shoulder or hand (e.g., golf, hammering, tennis, etc.). -   7. During the past week, to what extent has your arm, shoulder or hand problem interfered with your normal social activities with family, friends, neighbours or groups? -   8. During the past week, were you limited in your work or other regular daily activities as a result of your arm, shoulder or hand problem? -   9. Arm, shoulder or hand pain. -   10. Tingling (pins and needles) in your arm,shoulder or hand. -   11. During the past week, how much difficulty have you had sleeping because of the pain in your arm, shoulder or hand? ([place] number) -   SUM: -          Current Outpatient Medications   Medication Sig Dispense Refill     Acetaminophen (APAP 500 PO) Take 1,000 mg by mouth 3 times daily        amLODIPine (NORVASC) 10 MG tablet Take 1 tablet (10 mg) by mouth daily (Patient taking differently: Take 10 mg by mouth every 10 days Takes around 10 AM.) 90 tablet 3     Ascorbic Acid (VITAMIN C) 500 MG CHEW Take 1 tablet by mouth daily       atorvastatin (LIPITOR) 80 MG tablet Take 1 tablet (80 mg) by mouth daily (Patient taking differently: Take 80 mg by mouth every evening ) 90 tablet 3     Cholecalciferol (VITAMIN D3) 50 MCG (2000 UT) CAPS Take 6,000 Units by mouth daily (Patient taking differently: Take 6,000 Units by mouth daily (with lunch) ) 270 capsule 3     CRANBERRY EXTRACT PO Take 650 mg by mouth daily ~10 am       cyclobenzaprine (FLEXERIL) 10 MG tablet Take 1 tablet (10 mg) by mouth 3 times daily as needed for muscle spasms 40 tablet 3     diclofenac (VOLTAREN) 1 % topical gel Apply 4 grams to knees or 2 grams to hands four times daily using enclosed dosing card. (Patient taking differently: Apply 4 grams to knees or 2 grams to hands four times daily as needed using enclosed dosing card.) 100 g 1     gabapentin (NEURONTIN) 300 MG capsule Take 1-2 capsules by mouth  every 8 hours (Patient taking differently: Take 600 mg by mouth 3 times daily ) 540 capsule 3     HYDROcodone-acetaminophen (NORCO) 5-325 MG tablet Take 1 tablet by mouth every 6 hours as needed for severe pain 40 tablet 0     ibuprofen (ADVIL/MOTRIN) 200 MG tablet Take 600 mg by mouth 3 times daily        levothyroxine (SYNTHROID/LEVOTHROID) 112 MCG tablet TAKE 1/2 TABLET BY MOUTH DAILY 45 tablet 3     lisinopril (ZESTRIL) 40 MG tablet Take 1 tablet (40 mg) by mouth daily (Patient taking differently: Take 20 mg by mouth 2 times daily Takes 1/2 tablet at 2 PM and the other 1/2 tablet around 10 PM) 90 tablet 3     melatonin 5 MG tablet Take 10 mg by mouth At Bedtime        metoprolol succinate ER (TOPROL-XL) 50 MG 24 hr tablet Take 1 tablet (50 mg) by mouth daily (Patient taking differently: Take 50 mg by mouth every morning ) 90 tablet 3     Multiple Vitamin (MULTI-VITAMIN) per tablet Take 1 tablet by mouth daily (with lunch)        ondansetron (ZOFRAN-ODT) 4 MG ODT tab Take 1 tablet (4 mg) by mouth every 12 hours as needed for nausea 180 tablet 3     potassium chloride ER (KLOR-CON M) 20 MEQ CR tablet Take 20 meq three times daily. (Patient taking differently: Take 20 mEq by mouth 3 times daily Take 20 meq three times daily.) 90 tablet 3     terbinafine (LAMISIL AT) 1 % external cream Apply topically 2 times daily For fungal infection not resolved with other antifungals (e.g. Clotrimazole) (Patient taking differently: Apply topically 2 times daily as needed (toenail fungus) For fungal infection not resolved with other antifungals (e.g. Clotrimazole)) 24 g 11     tiZANidine (ZANAFLEX) 2 MG tablet Take 1-2 tablets (2-4 mg) by mouth 3 times daily as needed for muscle spasms (Patient taking differently: Take 2-4 mg by mouth 3 times daily as needed for muscle spasms Uses this or flexeril - prefers tizanidine.) 90 tablet 1     triamcinolone (KENALOG) 0.1 % external ointment Apply topically 2 times daily (Patient taking  differently: Apply topically 2 times daily as needed ) 30 g 11     triamterene-HCTZ (MAXZIDE-25) 37.5-25 MG tablet Take 1 tablet by mouth daily 90 tablet 3     Vaginal Lubricant (REPHRESH) GEL Place 2 g vaginally every 3 days 2 g 3       Allergies   Allergen Reactions     Penicillins Hives     Around age 6 - doesn't recall full reaction, mother told her it was hives.     Has tolerated cephalsporins.        Mary Del Cid, ATC

## 2021-11-11 NOTE — PROGRESS NOTES
[facility]        OUTPATIENT PHYSICAL THERAPY EDEMA EVALUATION  PLAN OF TREATMENT FOR OUTPATIENT REHABILITATION  (COMPLETE FOR INITIAL CLAIMS ONLY)  Patient's Last Name, First Name, M.I.  ChrisNadira                           Provider s Name:   [facility] Medical Record No.  [id number]     Start of Care Date:  11/11/21   Onset Date:      Type:  PT   Medical Diagnosis:  lymphedema, malignant neoplasm of breast   Therapy Diagnosis:  lymphedema, fibrosis Visits from SOC:  1                                     __________________________________________________________________________________   Plan of Treatment/Functional Goals:    Manual lymph drainage,Gradient compression bandaging,Fit for compression garment,Exercises,Precautions to prevent infection / exacerbation,Education,Manual therapy,ADL training,Skin care / precautions,Scar mobilization,Soft tissue mobilization,Myofascial release,Home management program development  focus on upgrading current HEP due to other comorbidities limiting status     GOALS  1. Goal description: Pt will be independent in upgraded HEP for chronic L UE/UQ lymphedema       Target date: 02/09/22  2. Goal description: Decrease volume L UE by 5% to demonstrate decreased risk of infection       Target date: 01/10/22  3. Goal description: Decrease score of LLIS by 3 points to demonstrate decrease impact of lyphedmea on function       Target date: 02/09/22            Treatment Frequency: Other (see comments) (10 sessions over 3 months if pt opting for intensive CLT)   Treatment duration: 3 months    Jennifer Jauregui, PT                                    I CERTIFY THE NEED FOR THESE SERVICES FURNISHED UNDER        THIS PLAN OF TREATMENT AND WHILE UNDER MY CARE     (Physician co-signature of this document indicates review and certification of  the therapy plan).                   Certification date from: 11/11/21       Certification date to: 02/09/22           Referring physician: Khushbu Louise MD   Initial Assessment  See Epic Evaluation- Start of care: 11/11/21

## 2021-11-12 ENCOUNTER — PREP FOR PROCEDURE (OUTPATIENT)
Dept: ORTHOPEDICS | Facility: CLINIC | Age: 71
End: 2021-11-12
Payer: COMMERCIAL

## 2021-11-12 NOTE — PROGRESS NOTES
Medicare Annual Wellness Visit Previsit note    This 71 year old year old female presents for a  Medicare Wellness Exam.           Medical Care     Have you been to an ER or a hospital in the last year? No  What other specialists or organizations are involved in your medical care?  See chart  Current providers sharing in care for this patient include:  Patient Care Team       Relationship Specialty Notifications Start End    Matilde Quiñonez APRN CNP PCP - General   10/8/12     Phone: [phone] Fax: [phone]         [address] [place] [address] [address] [facility] [address]    Live Barrett DPM  Podiatry  4/6/15     Phone: [phone] Fax: [phone]         [address] [facility] [address]    Matilde Quiñonez APRN CNP Referring Physician Nurse Practitioner  9/29/16     Phone: [phone] Fax: [phone]         [address] [facility] [address] [facility] [address]    Guru Kely Barrera MD MD Gastroenterology  9/29/16     Phone: [phone] Fax: [phone]         [address] [facility] [address]    Lien Prajapati MD MD Orthopedics  12/1/16     Phone: [phone] Fax: [phone]         [address] [place] [address]    Abe Wilkerson MD MD Dermatology  10/5/18     Phone: [phone] Fax: [phone]         [address] [place] [address]    Kisha Heller [facility] Pharmacist Pharmacist Clinician- Clinical Pharmacy Specialist  10/5/18     Phone: [phone]          [address] [facility] [address]    Khushbu Louise MD MD Breast Oncology Admissions 12/26/19     Phone: [phone] Fax: [phone]         [address] [facility] [address] [facility] [address]    Sandra Jara, RN Specialty Care Coordinator Breast Oncology Admissions 12/26/19     Phone: [phone] Pager: [phone]        Khushbu Louise MD Assigned Cancer Care Provider   10/23/20     Phone: [phone] Fax: [phone]         [address] [place] [address]     Bob Arredondo MD Assigned Musculoskeletal Provider   10/23/20     Phone: [phone] Fax: [phone]         [address] [place] [address]    Matilde Quiñonez APRN CNP Assigned PCP   11/15/20     Phone: [phone] Fax: [phone]         [facility] [address] [facility] [address]    Willis So [facility]  Pharmacist  9/16/21     Phone: [phone] Fax: [phone]         [address] [place] [address]    Felix Carlos MD Assigned Surgical Provider   10/10/21     Phone: [phone] Fax: [phone]         [address] [place] [address]    Live Arias MD Assigned Neuroscience Provider   10/31/21     Phone: [phone] Fax: [phone]         [address] [facility] [address]                 Social History     Marital Status:  Who lives in your household? Me + part time roommate (1-2 days per week)  Does your home have any of the following safety concerns? Loose rugs in the hallway, no grab bars in the bathroom, no handrails on the stairs or have poorly lit areas?  Yes no grab bars, use wall corner to get off toilet  Do you feel threatened or controlled by a partner, ex-partner or anyone in your life? No  Has anyone hurt you physically, for example by pushing, hitting, slapping or kicking you   or forcing you to have sex? No  Do you need help with the phone, transportation, shopping, preparing meals, housework, laundry, medications or managing money? Yes housework, laundry, PT    Have you noticed any hearing difficulties? Yes nothing new, wear hearing aids      Risk Behaviors and Healthy Habits     How many servings of fruits and vegetables do you eat a day? 4  How often do you exercise and what do you do? 50 minutes 3x a week  Do you frequently ride without a seatbelt? No  Do you use tobacco?  No  Do you use any other drugs? Yes marijuana       Do you use alcohol?Yes  Number of drinks per day 0-1  Number of drinking days  a week 0-1      Sexual Health     Are you sexually active? No   If yes, with men, women, or both? n/a  If yes, do you more than one current partner?N/A  If yes, do you use condoms? N/A  Have you had any sexually transmitted infections? No  Any sexual concerns? No       FOR WOMEN ONLY  What year did you stop having periods? Hysterectomy - 2000's  Any vaginal bleeding in the last year? No  Have you ever had an abnormal Pap smear? Yes sme transitional cells    PHQ-2:   1) 0  2) 0  Total = 0    Fall risk:   1) Yes   2) Yes (F/u = [facility] @[facility]    HARPER Hines at 8:52 AM on 11/12/2021.

## 2021-11-15 ENCOUNTER — LAB (OUTPATIENT)
Dept: LAB | Facility: CLINIC | Age: 71
End: 2021-11-15
Attending: ORTHOPAEDIC SURGERY
Payer: COMMERCIAL

## 2021-11-15 PROCEDURE — U0005 INFEC AGEN DETEC AMPLI PROBE: HCPCS

## 2021-11-16 LAB — SARS-COV-2 RNA RESP QL NAA+PROBE: NEGATIVE

## 2021-11-17 ENCOUNTER — VIRTUAL VISIT (OUTPATIENT)
Dept: PHARMACY | Facility: CLINIC | Age: 71
End: 2021-11-17
Payer: COMMERCIAL

## 2021-11-17 DIAGNOSIS — Z78.9 TAKES DIETARY SUPPLEMENTS: ICD-10-CM

## 2021-11-17 DIAGNOSIS — E87.6 HYPOPOTASSEMIA: Primary | ICD-10-CM

## 2021-11-17 DIAGNOSIS — G47.00 INSOMNIA, UNSPECIFIED TYPE: ICD-10-CM

## 2021-11-17 DIAGNOSIS — G62.9 PERIPHERAL POLYNEUROPATHY: ICD-10-CM

## 2021-11-17 DIAGNOSIS — M19.141 POST-TRAUMATIC OSTEOARTHRITIS OF BOTH HANDS: ICD-10-CM

## 2021-11-17 DIAGNOSIS — E78.00 PURE HYPERCHOLESTEROLEMIA: ICD-10-CM

## 2021-11-17 DIAGNOSIS — E03.9 HYPOTHYROIDISM, UNSPECIFIED TYPE: ICD-10-CM

## 2021-11-17 DIAGNOSIS — L82.0 INFLAMED SEBORRHEIC KERATOSIS: ICD-10-CM

## 2021-11-17 DIAGNOSIS — M19.142 POST-TRAUMATIC OSTEOARTHRITIS OF BOTH HANDS: ICD-10-CM

## 2021-11-17 DIAGNOSIS — I10 BENIGN ESSENTIAL HYPERTENSION: Primary | ICD-10-CM

## 2021-11-17 DIAGNOSIS — B35.1 ONYCHOMYCOSIS: ICD-10-CM

## 2021-11-17 DIAGNOSIS — R42 VERTIGO: ICD-10-CM

## 2021-11-17 PROCEDURE — 99606 MTMS BY PHARM EST 15 MIN: CPT | Performed by: PHARMACIST

## 2021-11-17 PROCEDURE — 99607 MTMS BY PHARM ADDL 15 MIN: CPT | Performed by: PHARMACIST

## 2021-11-17 RX ORDER — SPIRONOLACTONE 25 MG/1
25 TABLET ORAL DAILY
Qty: 90 TABLET | Refills: 3 | Status: SHIPPED | OUTPATIENT
Start: 2021-11-17 | End: 2022-01-17

## 2021-11-17 NOTE — LETTER
"        Date: 2021    Nadira Perez  [address]  [facility] [address]    Dear Ms. Perez,    Thank you for talking with me on 21 about your health and medications. Medicare s MTM (Medication Therapy Management) program helps you understand your medications and use them safely.      This letter includes an action plan (Medication Action Plan) and medication list (Personal Medication List). The action plan has steps you should take to help you get the best results from your medications. The medication list will help you keep track of your medications and how to use them the right way.       Have your action plan and medication list with you when you talk with your doctors, pharmacists, and other healthcare providers in your care team.     Ask your doctors, pharmacists, and other healthcare providers to update the action plan and medication list at every visit.     Take your medication list with you if you go to the hospital or emergency room.     Give a copy of the action plan and medication list to your family or caregivers.     If you want to talk about this letter or any of the papers with it, please call   [phone].We look forward to working with you, your doctors, and other healthcare providers to help you stay healthy through the [employer] [place] MTM program.    Sincerely,  Willis So [facility]    Enclosed: Medication Action Plan and Personal Medication List    MEDICATION ACTION PLAN FOR Nadira Perez,  1950     This action plan will help you get the best results from your medications if you:   1. Read \"What we talked about.\"   2. Take the steps listed in the \"What I need to do\" boxes.   3. Fill in \"What I did and when I did it.\"   4. Fill in \"My follow-up plan\" and \"Questions I want to ask.\"     Have this action plan with you when you talk with your doctors, pharmacists, and other healthcare providers in your care team. Share this with your family or " caregivers too.  DATE PREPARED: 2021  What we talked about: Blood pressure medications and how the there could be an option to utilize spironolactone in the future after we touch base following her surgery.                                                  What I need to do: I (Willis )will discuss with will discuss with MERCEDES Kyle CNP about this option.       What I did and when I did it:                                              What we talked about: Your pain medications and how after your surgeries we may be able to decrease the doses of either gabapentin or ibuprofen.  Especially since ibuprofen can increase blood pressure.                                                  What I need to do: We will meet together after your surgery in a month from now to discuss these options.       What I did and when I did it:                                                 My follow-up plan:                 Questions I want to ask:              If you have any questions about your action plan, call Willis So [facility], Phone: [phone] , Monday-Friday 8-4:30pm.           PERSONAL MEDICATION LIST FOR Nadira Perez,  1950     This medication list was made for you after we talked. We also used information from your doctor's chart.      Use blank rows to add new medications. Then fill in the dates you started using them.    Cross out medications when you no longer use them. Then write the date and why you stopped using them.    Ask your doctors, pharmacists, and other healthcare providers to update this list at every visit. Keep this list up-to-date with:       Prescription medications    Over the counter drugs     Herbals    Vitamins    Minerals      If you go to the hospital or emergency room, take this list with you. Share this with your family or caregivers too.     DATE PREPARED: 2021  Allergies or side effects: Erythromycin and Penicillins     Medication:  AMLODIPINE BESYLATE 10 MG PO  TABS      How I use it:  Take 1 tablet (10 mg) by mouth daily      Why I use it: Benign essential hypertension    Prescriber:  MERCEDES Rivera CNP      Date I started using it:       Date I stopped using it:         Why I stopped using it:            Medication:  APAP 500 PO      How I use it:  Take 1,000 mg by mouth 3 times daily       Why I use it:  Pain    Prescriber:  Patient Reported      Date I started using it:       Date I stopped using it:         Why I stopped using it:            Medication:  ATORVASTATIN CALCIUM 80 MG PO TABS      How I use it:  Take 1 tablet (80 mg) by mouth daily      Why I use it: Pure hypercholesterolemia    Prescriber:  MERCEDES Rivera CNP      Date I started using it:       Date I stopped using it:         Why I stopped using it:            Medication:  CRANBERRY EXTRACT PO      How I use it:  Take 650 mg by mouth daily ~10 am      Why I use it:  UTI prevention    Prescriber:  Patient Reported      Date I started using it:       Date I stopped using it:         Why I stopped using it:            Medication:  CYCLOBENZAPRINE HCL 10 MG PO TABS      How I use it:  Take 1 tablet (10 mg) by mouth 3 times daily as needed for muscle spasms      Why I use it: Acute midline low back pain with sciatica, sciatica laterality unspecified    Prescriber:  MERCEDES Rivera CNP      Date I started using it:       Date I stopped using it:         Why I stopped using it:            Medication:  DICLOFENAC SODIUM 1 % EX GEL      How I use it:  Apply 4 grams to knees or 2 grams to hands four times daily using enclosed dosing card.      Why I use it: Right knee pain, unspecified chronicity    Prescriber:  MERCEDES Rivera CNP      Date I started using it:       Date I stopped using it:         Why I stopped using it:            Medication:  GABAPENTIN 300 MG PO CAPS      How I use it:  Take 1-2 capsules by mouth every 8 hours      Why I use it: Neuropathic pain     Prescriber:  MERCEDES Rivera CNP      Date I started using it:       Date I stopped using it:         Why I stopped using it:            Medication:  HYDROCODONE-ACETAMINOPHEN 5-325 MG PO TABS      How I use it:  Take 1 tablet by mouth every 6 hours as needed for severe pain      Why I use it: DDD (degenerative disc disease), lumbar    Prescriber:  MERCEDES Rivera CNP      Date I started using it:       Date I stopped using it:         Why I stopped using it:            Medication:  IBUPROFEN 200 MG PO TABS      How I use it:  Take 600 mg by mouth 3 times daily       Why I use it:  Pain    Prescriber:  Patient Reported      Date I started using it:       Date I stopped using it:         Why I stopped using it:            Medication:  LEVOTHYROXINE SODIUM 112 MCG PO TABS      How I use it:  TAKE 1/2 TABLET BY MOUTH DAILY      Why I use it: Hypothyroidism, unspecified type    Prescriber:  MERCEDES Rivera CNP      Date I started using it:       Date I stopped using it:         Why I stopped using it:            Medication:  LISINOPRIL 40 MG PO TABS      How I use it:  Take 1 tablet (40 mg) by mouth daily      Why I use it: Benign essential hypertension    Prescriber:  MERCEDES Rivera CNP      Date I started using it:       Date I stopped using it:         Why I stopped using it:            Medication:  MELATONIN 5 MG PO TABS      How I use it:  Take 10 mg by mouth At Bedtime       Why I use it:  Sleep    Prescriber:  Patient Reported      Date I started using it:       Date I stopped using it:         Why I stopped using it:            Medication:  METOPROLOL SUCCINATE ER 50 MG PO TB24      How I use it:  Take 1 tablet (50 mg) by mouth daily      Why I use it: Benign essential hypertension    Prescriber:  MERCEDES Rivera CNP      Date I started using it:       Date I stopped using it:         Why I stopped using it:            Medication:  MULTI-VITAMIN PO TABS      How I use it:   Take 1 tablet by mouth daily (with lunch)       Why I use it:      Prescriber:  Patient Reported      Date I started using it:       Date I stopped using it:         Why I stopped using it:            Medication:  ONDANSETRON 4 MG PO TBDP      How I use it:  Take 1 tablet (4 mg) by mouth every 12 hours as needed for nausea      Why I use it: Vertigo    Prescriber:  MERCEDES Rivera CNP      Date I started using it:       Date I stopped using it:         Why I stopped using it:            Medication:  POTASSIUM CHLORIDE MELODY ER 20 MEQ PO TBCR      How I use it:  Take 20 meq three times daily.      Why I use it: Hypopotassemia    Prescriber:  MERCEDES Rivera CNP      Date I started using it:       Date I stopped using it:         Why I stopped using it:            Medication:  REPHRESH VA GEL      How I use it:  Place 2 g vaginally every 3 days      Why I use it: Atrophic vaginitis    Prescriber:  MERCEDES Rivera CNP      Date I started using it:       Date I stopped using it:         Why I stopped using it:            Medication:  TERBINAFINE HCL 1 % EX CREA      How I use it:  Apply topically 2 times daily For fungal infection not resolved with other antifungals (e.g. Clotrimazole)      Why I use it: Tinea pedis of both feet    Prescriber:  MERCEDES Rivera CNP      Date I started using it:       Date I stopped using it:         Why I stopped using it:            Medication:  TIZANIDINE HCL 2 MG PO TABS      How I use it:  Take 1-2 tablets (2-4 mg) by mouth 3 times daily as needed for muscle spasms      Why I use it: Chronic right-sided low back pain with right-sided sciatica    Prescriber:  MERCEDES Rivera CNP      Date I started using it:       Date I stopped using it:         Why I stopped using it:            Medication:  TRIAMCINOLONE ACETONIDE 0.1 % EX OINT      How I use it:  Apply topically 2 times daily      Why I use it: Eczema, unspecified type    Prescriber:  Matilde  MERCEDES Taylor CNP      Date I started using it:       Date I stopped using it:         Why I stopped using it:            Medication:  TRIAMTERENE-HCTZ 37.5-25 MG PO TABS      How I use it:  Take 1 tablet by mouth daily      Why I use it: Essential hypertension, benign    Prescriber:  MERCEDES Rivera CNP      Date I started using it:       Date I stopped using it:         Why I stopped using it:            Medication:  VITAMIN C 500 MG PO CHEW      How I use it:  Take 1 tablet by mouth daily      Why I use it:  Supplement    Prescriber:  Patient Reported      Date I started using it:       Date I stopped using it:         Why I stopped using it:            Medication:  VITAMIN D3 50 MCG (2000 UT) PO CAPS      How I use it:  Take 6,000 Units by mouth daily      Why I use it: Hypovitaminosis D    Prescriber:  MERCEDES Rivera CNP      Date I started using it:       Date I stopped using it:         Why I stopped using it:            Medication:         How I use it:         Why I use it:      Prescriber:         Date I started using it:       Date I stopped using it:         Why I stopped using it:            Medication:         How I use it:         Why I use it:      Prescriber:         Date I started using it:       Date I stopped using it:         Why I stopped using it:            Medication:         How I use it:         Why I use it:      Prescriber:         Date I started using it:       Date I stopped using it:         Why I stopped using it:              Other Information:     If you have any questions about your medication list, call Willis So [facility], Phone: [phone] , Monday-Friday 8-4:30pm.    According to the Paperwork Reduction Act of 1995, no persons are required to respond to a collection of information unless it displays a valid OMB control number. The valid OMB number for this information collection is [id number]. The time required to complete this information collection is  estimated to average 40 minutes per response, including the time to review instructions, searching existing data resources, gather the data needed, and complete and review the information collection. If you have any comments concerning the accuracy of the time estimate(s) or suggestions for improving this form, please write to: CMS, Attn: CARSON Reports Clearance Officer, [address] [place] [id number].

## 2021-11-17 NOTE — PATIENT INSTRUCTIONS
Recommendations from today's MTM visit:                                                       1. I will discuss with Matilde Quiñonez.about adding spironolactone as a way to prevent hypokalemia.   2. We will discuss changing pain medications after your surgery depending on your pain levels.   3. Continue to work on decreasing your caffeine intake.  4. Get your updated cholesterol lab when you are able.    Follow-up: 1 month      It was great to speak with you today.  I value your experience and would be very thankful for your time with providing feedback on our clinic survey. You may receive a survey via email or text message in the next few days.     To schedule another MTM appointment, please call the clinic directly or you may call the MTM scheduling line at [phone] or toll-free at [phone].     My Clinical Pharmacist's contact information:                                                      Please feel free to contact me with any questions or concerns you have.      Willis So, Pharm. D.   Medication Therapy Management Pharmacist  Direct Voicemail: [phone]

## 2021-11-17 NOTE — Clinical Note
Hello,    I discussed blood pressure with Ismael today. I am assuming her hypokalemia is caused by her hydrochlorothiazide which is causing her to be on potassium 3 times daily. It may be beneficial after her surgery to consider switching the Maxzide over to a spironolactone because it seems like the triamterene portion of the Maxide is not doing enough to preserve the potassium. This would likely still give us good blood pressure control and decrease the risk of hypokalemia.    What are your thoughts on this?    Willis

## 2021-11-17 NOTE — PROGRESS NOTES
Medication Therapy Management (MTM) Encounter    ASSESSMENT:                            Medication Adherence/Access: No issues identified      Hypertension: Patient is not meeting blood pressure goal of < 130/80mmHg. She is having some blood pressures that are within range and some blood pressure that with are above range. Decreasing her caffeine and ibuprofen use are likely good options to help to decrease blood pressure at this time. We want to wait until after surgery to work on these changes. She has had issues with low potassium from the hydrochlorothiazide and it could be beneficial to consider a transition to a potassium sparing diuretic such as spironolactone because it seems like the triamterene is not sufficient to decrease the hypokalemia side effect of the hydrochlorothiazide.    Hyperlipidemia: Patient is due for an updated lipid lab which is ordered.    Hypothyroidism: Stable. Last TSH is within normal limits.     Sleep: Stable.     Nausea: Stable.     Creams: Stable.     Pain: Could be interested in weaning off gabapentin to see if it is working for her. We discussed that the ibuprofen can increase the blood pressure. It is likely that her surgery should decrease her issues with her pain.    Supplements: Stable.       PLAN:                            1. I will discuss with Matilde Quioñnez.about adding spironolactone as a way to prevent hypokalemia.   2. We will discuss changing pain medications after your surgery depending on your pain levels.   3. Continue to work on decreasing your caffeine intake.  4. Get your updated cholesterol lab when you are able.    Follow-up: 1 month      SUBJECTIVE/OBJECTIVE:                          Nadira Perez is a 71 year old female contacted via secure video for a follow-up visit. She was referred to me from MERCEDES Kyle CNP.  Today's visit is a follow-up MTM visit from 6/16/21. 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    Reason for visit: hypertension follow-up .    Allergies/ADRs: Reviewed in chart  Past Medical History: Reviewed in chart  Tobacco: She reports that she has never smoked. She has never used smokeless tobacco.  Alcohol: rare  Caffeine: diet coke (switching between caffeinated and de-caffeinated) about 6 cans per day (previously 10 per day)    Medication Adherence/Access: no issues reported    Hypertension: Current medications include metoprolol XL 50 mg and Maxzide-25- in the morning, at 10 AM she takes the amlodipine, at 4 pm she takes lisinopril 20 mg and about 10 pm she takes 20 mg. She also takes potassium 20 mEq three times a day. She reports that its sometimes hard to get all three in. Patient does self-monitor blood pressure. Home BP monitoring in range of 110-150's systolic over 70-90's diastolic.  Patient reports no current medication side effects. Has had some more recent urgency issues.    at 9 am 137/92 mmH pm 147/94 mmHg  11/15 630 am 151/88 mmHg 77 HR: 8 am 119/90 HR 94: 5 pm 110/77 mmHg 92 HR: 10:20 pm 120/80 mmHg    BP Readings from Last 3 Encounters:   21 (!) 150/89   21 (!) 146/94   10/29/21 130/88     Potassium   Date Value Ref Range Status   2021 3.4 3.4 - 5.3 mmol/L Final   2021 3.2 (L) 3.4 - 5.3 mmol/L Final   2021 3.3 (L) 3.4 - 5.3 mmol/L Final         Hyperlipidemia: Current therapy includes atorvastatin 80 mg daily.  Patient reports no significant myalgias or other side effects.  The 10-year ASCVD risk score (McCutchenvilledinorah TOMAS Jr., et al., 2013) is: 15.4%    Values used to calculate the score:      Age: 71 years      Sex: Female      Is Non- : No      Diabetic: No      Tobacco smoker: No      Systolic Blood Pressure: 150 mmHg      Is BP treated: Yes      HDL Cholesterol: 60 mg/dL      Total Cholesterol: 217 mg/dL  Recent Labs   Lab Test 21  1234 20  1105 10/27/16  1121 10/30/15  1221 14  1141   CHOL 217*  "246*   < > 182 228*   HDL 60 64   < > 48* 63   * 114*   < > 102 125   TRIG 229* 338*   < > 159* 198*   CHOLHDLRATIO  --   --   --  3.8 3.6    < > = values in this interval not displayed.     Hypothyroidism: Patient is taking levothyroxine 56 mcg (half of the 112 mcg tablet) daily. Patient is having the following symptoms: none.   TSH   Date Value Ref Range Status   08/26/2021 1.95 0.40 - 4.00 mU/L Final   11/11/2019 1.91 0.40 - 4.00 mU/L Final     Sleep: Takes melatonin 10 mg by mouth daily and this works well for her. No concerns or questions at this time.     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 If she moves her head too quickly she gets dizzy. She hasn't taken it yet but has it. She is followed by the balance clinic through neurology.     Creams: Has Lamisil 1% cream for toe fungus that she hasnt used lately since her toe has \"been better overall\". She also uses Kenalog 0.1% cream as needed for Eczema which has cleared. She also uses Rephresh Gel every 3 days due to vaginal dryness. No questions or concerns.    Pain: Alternates between tizanidine 2 mg and cyclobenzaprine 10 mg (one to two times a day). She also takes 1000 mg of acetaminophen three times a day, Vicodin 5-325 she takes about 2-3 per day, she does take ibuprofen 600 mg three times a day (but she is not using it for 3 days pre-op), gabapentin 600 mg three times a day (reports that \"who knows how well it works\"), and Voltaren 1% gel for hands and knees. Since not taking the ibuprofen her wrist has been hurting more than her back.     Supplements: She is taking vitamin D 6000 units daily, cranberry 650 mg daily, Centrum daily vitamin, and vitamin C 500 mg daily. No questions or concerns.    Vitamin D Deficiency Screening Results:  Lab Results   Component Value Date    VITDT 69 05/07/2021    VITDT 56 05/12/2020    VITDT 62 11/11/2019    VITDT 68 11/13/2018    VITDT 60 04/17/2017         Today's " Vitals: There were no vitals taken for this visit.  ----------------      I spent 51 minutes with this patient today (an extra 15 minutes was spent creating the Medication Action Plan). I offer these suggestions for consideration by MERCEDES Kyle CNP. A copy of the visit note was provided to the patient's primary care provider.    The patient was sent via [facility] a summary of these recommendations.     Willis So, Pharm. D.   Medication Therapy Management Pharmacist  Direct Voicemail: [phone]    Telemedicine Visit Details  Type of service:  Video Conference via depict  Start Time: 10:30 am  End Time: 11:21 AM  Originating Location (patient location): Home  Distant Location (provider location):  [facility]     Medication Therapy Recommendations  Benign essential hypertension    Current Medication: triamterene-HCTZ (MAXZIDE-25) 37.5-25 MG tablet   Rationale: Undesirable effect - Adverse medication event - Safety   Recommendation: Change Medication - SPIRONOLACTONE   Status: Contact Provider - Awaiting Response         Osteoarthrosis, hand    Current Medication: ibuprofen (ADVIL/MOTRIN) 200 MG tablet   Rationale: Unsafe medication for the patient - Adverse medication event - Safety   Recommendation: Decrease Dose   Status: Patient Agreed - Adherence/Education

## 2021-11-18 ENCOUNTER — APPOINTMENT (OUTPATIENT)
Dept: GENERAL RADIOLOGY | Facility: CLINIC | Age: 71
DRG: 454 | End: 2021-11-18
Attending: ORTHOPAEDIC SURGERY
Payer: COMMERCIAL

## 2021-11-18 ENCOUNTER — HOSPITAL ENCOUNTER (INPATIENT)
Facility: CLINIC | Age: 71
LOS: 4 days | Discharge: HOME | DRG: 454 | End: 2021-11-22
Attending: ORTHOPAEDIC SURGERY | Admitting: ORTHOPAEDIC SURGERY
Payer: COMMERCIAL

## 2021-11-18 ENCOUNTER — ANESTHESIA (OUTPATIENT)
Dept: SURGERY | Facility: CLINIC | Age: 71
DRG: 454 | End: 2021-11-18
Payer: COMMERCIAL

## 2021-11-18 DIAGNOSIS — M41.50 DEGENERATIVE SCOLIOSIS IN ADULT PATIENT: ICD-10-CM

## 2021-11-18 DIAGNOSIS — M48.062 SPINAL STENOSIS OF LUMBAR REGION WITH NEUROGENIC CLAUDICATION: ICD-10-CM

## 2021-11-18 DIAGNOSIS — M43.16 SPONDYLOLISTHESIS OF LUMBAR REGION: ICD-10-CM

## 2021-11-18 LAB
ANION GAP SERPL CALCULATED.3IONS-SCNC: 7 MMOL/L (ref 3–14)
BASE EXCESS BLDA CALC-SCNC: 2.9 MMOL/L (ref -9–1.8)
BUN SERPL-MCNC: 15 MG/DL (ref 7–30)
CALCIUM SERPL-MCNC: 8.8 MG/DL (ref 8.5–10.1)
CHLORIDE BLD-SCNC: 105 MMOL/L (ref 94–109)
CO2 SERPL-SCNC: 26 MMOL/L (ref 20–32)
CREAT SERPL-MCNC: 0.69 MG/DL (ref 0.52–1.04)
GFR SERPL CREATININE-BSD FRML MDRD: 89 ML/MIN/1.73M2
GLUCOSE BLD-MCNC: 150 MG/DL (ref 70–99)
GLUCOSE BLDC GLUCOMTR-MCNC: 122 MG/DL (ref 70–99)
HCO3 BLD-SCNC: 29 MMOL/L (ref 21–28)
HGB BLD-MCNC: 10.8 G/DL (ref 11.7–15.7)
O2/TOTAL GAS SETTING VFR VENT: 21 %
OXYHGB MFR BLD: 97 % (ref 92–100)
PCO2 BLD: 47 MM HG (ref 35–45)
PH BLD: 7.39 [PH] (ref 7.35–7.45)
PO2 BLD: 124 MM HG (ref 80–105)
POTASSIUM BLD-SCNC: 2.6 MMOL/L (ref 3.4–5.3)
POTASSIUM BLD-SCNC: 3.2 MMOL/L (ref 3.4–5.3)
SODIUM SERPL-SCNC: 138 MMOL/L (ref 133–144)

## 2021-11-18 PROCEDURE — 250N000009 HC RX 250: Performed by: STUDENT IN AN ORGANIZED HEALTH CARE EDUCATION/TRAINING PROGRAM

## 2021-11-18 PROCEDURE — 250N000013 HC RX MED GY IP 250 OP 250 PS 637: Performed by: STUDENT IN AN ORGANIZED HEALTH CARE EDUCATION/TRAINING PROGRAM

## 2021-11-18 PROCEDURE — 93005 ELECTROCARDIOGRAM TRACING: CPT

## 2021-11-18 PROCEDURE — 61783 SCAN PROC SPINAL: CPT | Mod: GC | Performed by: ORTHOPAEDIC SURGERY

## 2021-11-18 PROCEDURE — 85018 HEMOGLOBIN: CPT | Performed by: INTERNAL MEDICINE

## 2021-11-18 PROCEDURE — 01NB0ZZ RELEASE LUMBAR NERVE, OPEN APPROACH: ICD-10-PCS | Performed by: ORTHOPAEDIC SURGERY

## 2021-11-18 PROCEDURE — 710N000010 HC RECOVERY PHASE 1, LEVEL 2, PER MIN: Performed by: ORTHOPAEDIC SURGERY

## 2021-11-18 PROCEDURE — 250N000024 HC ISOFLURANE, PER MIN: Performed by: ORTHOPAEDIC SURGERY

## 2021-11-18 PROCEDURE — 22840 INSERT SPINE FIXATION DEVICE: CPT | Mod: GC | Performed by: ORTHOPAEDIC SURGERY

## 2021-11-18 PROCEDURE — 258N000003 HC RX IP 258 OP 636: Performed by: PHYSICIAN ASSISTANT

## 2021-11-18 PROCEDURE — 0SG3071 FUSION OF LUMBOSACRAL JOINT WITH AUTOLOGOUS TISSUE SUBSTITUTE, POSTERIOR APPROACH, POSTERIOR COLUMN, OPEN APPROACH: ICD-10-PCS | Performed by: ORTHOPAEDIC SURGERY

## 2021-11-18 PROCEDURE — 22853 INSJ BIOMECHANICAL DEVICE: CPT | Mod: GC | Performed by: ORTHOPAEDIC SURGERY

## 2021-11-18 PROCEDURE — 250N000011 HC RX IP 250 OP 636: Performed by: NURSE ANESTHETIST, CERTIFIED REGISTERED

## 2021-11-18 PROCEDURE — 80048 BASIC METABOLIC PNL TOTAL CA: CPT | Performed by: INTERNAL MEDICINE

## 2021-11-18 PROCEDURE — 272N000001 HC OR GENERAL SUPPLY STERILE: Performed by: ORTHOPAEDIC SURGERY

## 2021-11-18 PROCEDURE — 22612 ARTHRD PST TQ 1NTRSPC LUMBAR: CPT | Mod: 22 | Performed by: ORTHOPAEDIC SURGERY

## 2021-11-18 PROCEDURE — 01NR0ZZ RELEASE SACRAL NERVE, OPEN APPROACH: ICD-10-PCS | Performed by: ORTHOPAEDIC SURGERY

## 2021-11-18 PROCEDURE — 93010 ELECTROCARDIOGRAM REPORT: CPT | Performed by: INTERNAL MEDICINE

## 2021-11-18 PROCEDURE — 99207 PR CONSULT E&M CHANGED TO INITIAL LEVEL: CPT | Performed by: INTERNAL MEDICINE

## 2021-11-18 PROCEDURE — 82805 BLOOD GASES W/O2 SATURATION: CPT | Performed by: STUDENT IN AN ORGANIZED HEALTH CARE EDUCATION/TRAINING PROGRAM

## 2021-11-18 PROCEDURE — 258N000003 HC RX IP 258 OP 636: Performed by: STUDENT IN AN ORGANIZED HEALTH CARE EDUCATION/TRAINING PROGRAM

## 2021-11-18 PROCEDURE — 999N000180 XR SURGERY CARM FLUORO LESS THAN 5 MIN: Mod: TC

## 2021-11-18 PROCEDURE — 99222 1ST HOSP IP/OBS MODERATE 55: CPT | Performed by: INTERNAL MEDICINE

## 2021-11-18 PROCEDURE — 999N000141 HC STATISTIC PRE-PROCEDURE NURSING ASSESSMENT: Performed by: ORTHOPAEDIC SURGERY

## 2021-11-18 PROCEDURE — C1713 ANCHOR/SCREW BN/BN,TIS/BN: HCPCS | Performed by: ORTHOPAEDIC SURGERY

## 2021-11-18 PROCEDURE — L8699 PROSTHETIC IMPLANT NOS: HCPCS | Performed by: ORTHOPAEDIC SURGERY

## 2021-11-18 PROCEDURE — 250N000011 HC RX IP 250 OP 636: Performed by: STUDENT IN AN ORGANIZED HEALTH CARE EDUCATION/TRAINING PROGRAM

## 2021-11-18 PROCEDURE — 250N000009 HC RX 250: Performed by: NURSE ANESTHETIST, CERTIFIED REGISTERED

## 2021-11-18 PROCEDURE — 22214 INCIS 1 VERTEBRAL SEG LUMBAR: CPT | Mod: 22 | Performed by: ORTHOPAEDIC SURGERY

## 2021-11-18 PROCEDURE — 8E0WXBF COMPUTER ASSISTED PROCEDURE OF TRUNK REGION, WITH FLUOROSCOPY: ICD-10-PCS | Performed by: ORTHOPAEDIC SURGERY

## 2021-11-18 PROCEDURE — 250N000013 HC RX MED GY IP 250 OP 250 PS 637: Performed by: PHYSICIAN ASSISTANT

## 2021-11-18 PROCEDURE — 272N000004 HC RX 272: Performed by: ORTHOPAEDIC SURGERY

## 2021-11-18 PROCEDURE — 250N000013 HC RX MED GY IP 250 OP 250 PS 637: Performed by: INTERNAL MEDICINE

## 2021-11-18 PROCEDURE — 999N000157 HC STATISTIC RCP TIME EA 10 MIN

## 2021-11-18 PROCEDURE — 0SG30A0 FUSION OF LUMBOSACRAL JOINT WITH INTERBODY FUSION DEVICE, ANTERIOR APPROACH, ANTERIOR COLUMN, OPEN APPROACH: ICD-10-PCS | Performed by: ORTHOPAEDIC SURGERY

## 2021-11-18 PROCEDURE — 120N000002 HC R&B MED SURG/OB UMMC

## 2021-11-18 PROCEDURE — 360N000086 HC SURGERY LEVEL 6 W/ FLUORO, PER MIN: Performed by: ORTHOPAEDIC SURGERY

## 2021-11-18 PROCEDURE — 258N000003 HC RX IP 258 OP 636: Performed by: NURSE ANESTHETIST, CERTIFIED REGISTERED

## 2021-11-18 PROCEDURE — 22558 ARTHRD ANT NTRBD MIN DSC LUM: CPT | Mod: 62 | Performed by: ORTHOPAEDIC SURGERY

## 2021-11-18 PROCEDURE — 36415 COLL VENOUS BLD VENIPUNCTURE: CPT | Performed by: INTERNAL MEDICINE

## 2021-11-18 PROCEDURE — 250N000009 HC RX 250: Performed by: PHYSICIAN ASSISTANT

## 2021-11-18 PROCEDURE — 250N000011 HC RX IP 250 OP 636: Performed by: ORTHOPAEDIC SURGERY

## 2021-11-18 PROCEDURE — 0ST40ZZ RESECTION OF LUMBOSACRAL DISC, OPEN APPROACH: ICD-10-PCS | Performed by: ORTHOPAEDIC SURGERY

## 2021-11-18 PROCEDURE — 370N000017 HC ANESTHESIA TECHNICAL FEE, PER MIN: Performed by: ORTHOPAEDIC SURGERY

## 2021-11-18 PROCEDURE — 999N000015 HC STATISTIC ARTERIAL MONITORING DAILY

## 2021-11-18 PROCEDURE — 22558 ARTHRD ANT NTRBD MIN DSC LUM: CPT | Mod: 62 | Performed by: SURGERY

## 2021-11-18 PROCEDURE — 250N000011 HC RX IP 250 OP 636: Performed by: PHYSICIAN ASSISTANT

## 2021-11-18 DEVICE — IMP SCR SET MEDT SOLERA BREAK OFF 5.5MM TI 5540030: Type: IMPLANTABLE DEVICE | Site: SPINE LUMBAR | Status: FUNCTIONAL

## 2021-11-18 DEVICE — IMP ROD MEDT SOLERA CVD 5.5X40MM CHR 1555501040: Type: IMPLANTABLE DEVICE | Site: SPINE LUMBAR | Status: FUNCTIONAL

## 2021-11-18 DEVICE — IMP SCR MEDT SOVEREIGN SELF TAP 5.5X20MM 7975520: Type: IMPLANTABLE DEVICE | Site: SPINE LUMBAR | Status: FUNCTIONAL

## 2021-11-18 DEVICE — IMP SCR MEDT 5.5/6.0MM SOLERA 7.5X40MM MA 55840007540: Type: IMPLANTABLE DEVICE | Site: SPINE LUMBAR | Status: FUNCTIONAL

## 2021-11-18 DEVICE — IMPLANTABLE DEVICE: Type: IMPLANTABLE DEVICE | Site: SPINE LUMBAR | Status: FUNCTIONAL

## 2021-11-18 DEVICE — IMP SCR MEDT 5.5/6.0MM SOLERA 7.5X60MM MA 55840007560: Type: IMPLANTABLE DEVICE | Site: SPINE LUMBAR | Status: FUNCTIONAL

## 2021-11-18 DEVICE — GRAFT BONE INFUSE BMP MED 7510400: Type: IMPLANTABLE DEVICE | Site: SPINE LUMBAR | Status: FUNCTIONAL

## 2021-11-18 DEVICE — IMP SCR MEDT 5.5/6.0MM SOLERA 6.5X55MM MA 55840006555: Type: IMPLANTABLE DEVICE | Site: SPINE LUMBAR | Status: FUNCTIONAL

## 2021-11-18 DEVICE — IMP SCR MEDT 5.5/6.0MM SOLERA 7.5X45MM MA 55840007545: Type: IMPLANTABLE DEVICE | Site: SPINE LUMBAR | Status: FUNCTIONAL

## 2021-11-18 RX ORDER — PROPOFOL 10 MG/ML
INJECTION, EMULSION INTRAVENOUS CONTINUOUS PRN
Status: DISCONTINUED | OUTPATIENT
Start: 2021-11-18 | End: 2021-11-18

## 2021-11-18 RX ORDER — VANCOMYCIN HYDROCHLORIDE 1 G/20ML
INJECTION, POWDER, LYOPHILIZED, FOR SOLUTION INTRAVENOUS PRN
Status: DISCONTINUED | OUTPATIENT
Start: 2021-11-18 | End: 2021-11-18 | Stop reason: HOSPADM

## 2021-11-18 RX ORDER — POTASSIUM CHLORIDE 750 MG/1
40 TABLET, EXTENDED RELEASE ORAL ONCE
Status: COMPLETED | OUTPATIENT
Start: 2021-11-18 | End: 2021-11-18

## 2021-11-18 RX ORDER — POTASSIUM CHLORIDE 7.45 MG/ML
10 INJECTION INTRAVENOUS
Status: COMPLETED | OUTPATIENT
Start: 2021-11-18 | End: 2021-11-18

## 2021-11-18 RX ORDER — LABETALOL HYDROCHLORIDE 5 MG/ML
10 INJECTION, SOLUTION INTRAVENOUS ONCE
Status: COMPLETED | OUTPATIENT
Start: 2021-11-18 | End: 2021-11-18

## 2021-11-18 RX ORDER — FENTANYL CITRATE 50 UG/ML
25 INJECTION, SOLUTION INTRAMUSCULAR; INTRAVENOUS EVERY 5 MIN PRN
Status: DISCONTINUED | OUTPATIENT
Start: 2021-11-18 | End: 2021-11-18 | Stop reason: HOSPADM

## 2021-11-18 RX ORDER — CEFAZOLIN SODIUM 1 G/3ML
1 INJECTION, POWDER, FOR SOLUTION INTRAMUSCULAR; INTRAVENOUS EVERY 8 HOURS
Status: DISCONTINUED | OUTPATIENT
Start: 2021-11-19 | End: 2021-11-19

## 2021-11-18 RX ORDER — HYDROMORPHONE HYDROCHLORIDE 1 MG/ML
0.5 INJECTION, SOLUTION INTRAMUSCULAR; INTRAVENOUS; SUBCUTANEOUS
Status: DISCONTINUED | OUTPATIENT
Start: 2021-11-18 | End: 2021-11-21

## 2021-11-18 RX ORDER — NALOXONE HYDROCHLORIDE 0.4 MG/ML
0.2 INJECTION, SOLUTION INTRAMUSCULAR; INTRAVENOUS; SUBCUTANEOUS
Status: DISCONTINUED | OUTPATIENT
Start: 2021-11-18 | End: 2021-11-22 | Stop reason: HOSPADM

## 2021-11-18 RX ORDER — SODIUM CHLORIDE, SODIUM LACTATE, POTASSIUM CHLORIDE, CALCIUM CHLORIDE 600; 310; 30; 20 MG/100ML; MG/100ML; MG/100ML; MG/100ML
INJECTION, SOLUTION INTRAVENOUS CONTINUOUS
Status: DISCONTINUED | OUTPATIENT
Start: 2021-11-18 | End: 2021-11-18 | Stop reason: HOSPADM

## 2021-11-18 RX ORDER — METHOCARBAMOL 500 MG/1
1000 TABLET, FILM COATED ORAL 4 TIMES DAILY PRN
Status: DISCONTINUED | OUTPATIENT
Start: 2021-11-18 | End: 2021-11-22 | Stop reason: HOSPADM

## 2021-11-18 RX ORDER — FENTANYL CITRATE 50 UG/ML
25 INJECTION, SOLUTION INTRAMUSCULAR; INTRAVENOUS EVERY 10 MIN PRN
Status: DISCONTINUED | OUTPATIENT
Start: 2021-11-18 | End: 2021-11-19 | Stop reason: CLARIF

## 2021-11-18 RX ORDER — OXYCODONE HYDROCHLORIDE 5 MG/1
5 TABLET ORAL EVERY 4 HOURS PRN
Status: DISCONTINUED | OUTPATIENT
Start: 2021-11-18 | End: 2021-11-18 | Stop reason: HOSPADM

## 2021-11-18 RX ORDER — POTASSIUM CHLORIDE 750 MG/1
20 TABLET, EXTENDED RELEASE ORAL 3 TIMES DAILY
Status: DISCONTINUED | OUTPATIENT
Start: 2021-11-19 | End: 2021-11-22 | Stop reason: HOSPADM

## 2021-11-18 RX ORDER — LEVOTHYROXINE SODIUM 112 UG/1
55 TABLET ORAL DAILY
Status: DISCONTINUED | OUTPATIENT
Start: 2021-11-19 | End: 2021-11-22 | Stop reason: HOSPADM

## 2021-11-18 RX ORDER — GABAPENTIN 300 MG/1
600 CAPSULE ORAL ONCE
Status: COMPLETED | OUTPATIENT
Start: 2021-11-18 | End: 2021-11-18

## 2021-11-18 RX ORDER — LISINOPRIL 10 MG/1
20 TABLET ORAL 2 TIMES DAILY
Status: DISCONTINUED | OUTPATIENT
Start: 2021-11-18 | End: 2021-11-22 | Stop reason: HOSPADM

## 2021-11-18 RX ORDER — LIDOCAINE 40 MG/G
CREAM TOPICAL
Status: DISCONTINUED | OUTPATIENT
Start: 2021-11-18 | End: 2021-11-22 | Stop reason: HOSPADM

## 2021-11-18 RX ORDER — ONDANSETRON 2 MG/ML
4 INJECTION INTRAMUSCULAR; INTRAVENOUS EVERY 6 HOURS PRN
Status: DISCONTINUED | OUTPATIENT
Start: 2021-11-18 | End: 2021-11-22 | Stop reason: HOSPADM

## 2021-11-18 RX ORDER — FAMOTIDINE 20 MG/1
20 TABLET, FILM COATED ORAL 2 TIMES DAILY
Status: DISCONTINUED | OUTPATIENT
Start: 2021-11-18 | End: 2021-11-22 | Stop reason: HOSPADM

## 2021-11-18 RX ORDER — GABAPENTIN 300 MG/1
300 CAPSULE ORAL 3 TIMES DAILY
Status: DISCONTINUED | OUTPATIENT
Start: 2021-11-18 | End: 2021-11-18

## 2021-11-18 RX ORDER — GABAPENTIN 300 MG/1
600 CAPSULE ORAL 3 TIMES DAILY
Status: DISCONTINUED | OUTPATIENT
Start: 2021-11-18 | End: 2021-11-19

## 2021-11-18 RX ORDER — ONDANSETRON 4 MG/1
4 TABLET, ORALLY DISINTEGRATING ORAL EVERY 6 HOURS PRN
Status: DISCONTINUED | OUTPATIENT
Start: 2021-11-18 | End: 2021-11-22 | Stop reason: HOSPADM

## 2021-11-18 RX ORDER — AMLODIPINE BESYLATE 10 MG/1
10 TABLET ORAL DAILY
Status: DISCONTINUED | OUTPATIENT
Start: 2021-11-19 | End: 2021-11-22 | Stop reason: HOSPADM

## 2021-11-18 RX ORDER — PROCHLORPERAZINE MALEATE 5 MG
5 TABLET ORAL EVERY 6 HOURS PRN
Status: DISCONTINUED | OUTPATIENT
Start: 2021-11-18 | End: 2021-11-22 | Stop reason: HOSPADM

## 2021-11-18 RX ORDER — ONDANSETRON 2 MG/ML
4 INJECTION INTRAMUSCULAR; INTRAVENOUS EVERY 30 MIN PRN
Status: DISCONTINUED | OUTPATIENT
Start: 2021-11-18 | End: 2021-11-18 | Stop reason: HOSPADM

## 2021-11-18 RX ORDER — GABAPENTIN 300 MG/1
300 CAPSULE ORAL
Status: DISCONTINUED | OUTPATIENT
Start: 2021-11-18 | End: 2021-11-18

## 2021-11-18 RX ORDER — SODIUM CHLORIDE, SODIUM LACTATE, POTASSIUM CHLORIDE, CALCIUM CHLORIDE 600; 310; 30; 20 MG/100ML; MG/100ML; MG/100ML; MG/100ML
INJECTION, SOLUTION INTRAVENOUS CONTINUOUS PRN
Status: DISCONTINUED | OUTPATIENT
Start: 2021-11-18 | End: 2021-11-18

## 2021-11-18 RX ORDER — ACETAMINOPHEN 325 MG/1
975 TABLET ORAL EVERY 8 HOURS
Status: DISCONTINUED | OUTPATIENT
Start: 2021-11-18 | End: 2021-11-18

## 2021-11-18 RX ORDER — METHOCARBAMOL 100 MG/ML
500 INJECTION, SOLUTION INTRAMUSCULAR; INTRAVENOUS ONCE
Status: DISCONTINUED | OUTPATIENT
Start: 2021-11-18 | End: 2021-11-19 | Stop reason: CLARIF

## 2021-11-18 RX ORDER — DEXAMETHASONE SODIUM PHOSPHATE 4 MG/ML
INJECTION, SOLUTION INTRA-ARTICULAR; INTRALESIONAL; INTRAMUSCULAR; INTRAVENOUS; SOFT TISSUE PRN
Status: DISCONTINUED | OUTPATIENT
Start: 2021-11-18 | End: 2021-11-18

## 2021-11-18 RX ORDER — GLYCERIN/POLYCARBOPHL/CARBOMER
2 GEL WITH PREFILLED APPLICATOR (GRAM) VAGINAL
Status: DISCONTINUED | OUTPATIENT
Start: 2021-11-18 | End: 2021-11-19 | Stop reason: RX

## 2021-11-18 RX ORDER — OXYCODONE HYDROCHLORIDE 5 MG/1
5-10 TABLET ORAL
Status: DISCONTINUED | OUTPATIENT
Start: 2021-11-18 | End: 2021-11-22 | Stop reason: HOSPADM

## 2021-11-18 RX ORDER — POLYETHYLENE GLYCOL 3350 17 G/17G
17 POWDER, FOR SOLUTION ORAL DAILY
Status: DISCONTINUED | OUTPATIENT
Start: 2021-11-19 | End: 2021-11-19

## 2021-11-18 RX ORDER — HYDRALAZINE HYDROCHLORIDE 20 MG/ML
10 INJECTION INTRAMUSCULAR; INTRAVENOUS EVERY 6 HOURS PRN
Status: DISCONTINUED | OUTPATIENT
Start: 2021-11-18 | End: 2021-11-22 | Stop reason: HOSPADM

## 2021-11-18 RX ORDER — ONDANSETRON 2 MG/ML
INJECTION INTRAMUSCULAR; INTRAVENOUS PRN
Status: DISCONTINUED | OUTPATIENT
Start: 2021-11-18 | End: 2021-11-18

## 2021-11-18 RX ORDER — BUPIVACAINE HYDROCHLORIDE 2.5 MG/ML
INJECTION, SOLUTION INFILTRATION; PERINEURAL PRN
Status: DISCONTINUED | OUTPATIENT
Start: 2021-11-18 | End: 2021-11-18 | Stop reason: HOSPADM

## 2021-11-18 RX ORDER — NALOXONE HYDROCHLORIDE 0.4 MG/ML
0.4 INJECTION, SOLUTION INTRAMUSCULAR; INTRAVENOUS; SUBCUTANEOUS
Status: DISCONTINUED | OUTPATIENT
Start: 2021-11-18 | End: 2021-11-22 | Stop reason: HOSPADM

## 2021-11-18 RX ORDER — SCOLOPAMINE TRANSDERMAL SYSTEM 1 MG/1
1 PATCH, EXTENDED RELEASE TRANSDERMAL
Status: DISCONTINUED | OUTPATIENT
Start: 2021-11-18 | End: 2021-11-22 | Stop reason: HOSPADM

## 2021-11-18 RX ORDER — ASCORBIC ACID 500 MG
500 TABLET ORAL DAILY
Status: DISCONTINUED | OUTPATIENT
Start: 2021-11-19 | End: 2021-11-22 | Stop reason: HOSPADM

## 2021-11-18 RX ORDER — GABAPENTIN 300 MG/1
300 CAPSULE ORAL 3 TIMES DAILY
Status: DISCONTINUED | OUTPATIENT
Start: 2021-11-18 | End: 2021-11-19

## 2021-11-18 RX ORDER — HYDROMORPHONE HCL IN WATER/PF 6 MG/30 ML
0.2 PATIENT CONTROLLED ANALGESIA SYRINGE INTRAVENOUS EVERY 5 MIN PRN
Status: DISCONTINUED | OUTPATIENT
Start: 2021-11-18 | End: 2021-11-18 | Stop reason: HOSPADM

## 2021-11-18 RX ORDER — BISACODYL 10 MG
10 SUPPOSITORY, RECTAL RECTAL DAILY PRN
Status: DISCONTINUED | OUTPATIENT
Start: 2021-11-18 | End: 2021-11-22 | Stop reason: HOSPADM

## 2021-11-18 RX ORDER — METOPROLOL SUCCINATE 50 MG/1
50 TABLET, EXTENDED RELEASE ORAL EVERY MORNING
Status: DISCONTINUED | OUTPATIENT
Start: 2021-11-19 | End: 2021-11-22 | Stop reason: HOSPADM

## 2021-11-18 RX ORDER — ACETAMINOPHEN 325 MG/1
975 TABLET ORAL ONCE
Status: DISCONTINUED | OUTPATIENT
Start: 2021-11-18 | End: 2021-11-19

## 2021-11-18 RX ORDER — ATORVASTATIN CALCIUM 40 MG/1
80 TABLET, FILM COATED ORAL EVERY EVENING
Status: DISCONTINUED | OUTPATIENT
Start: 2021-11-18 | End: 2021-11-22 | Stop reason: HOSPADM

## 2021-11-18 RX ORDER — ONDANSETRON 4 MG/1
4 TABLET, ORALLY DISINTEGRATING ORAL EVERY 30 MIN PRN
Status: DISCONTINUED | OUTPATIENT
Start: 2021-11-18 | End: 2021-11-18 | Stop reason: HOSPADM

## 2021-11-18 RX ORDER — GABAPENTIN 100 MG/1
100 CAPSULE ORAL 3 TIMES DAILY
Status: DISCONTINUED | OUTPATIENT
Start: 2021-11-19 | End: 2021-11-19

## 2021-11-18 RX ORDER — CEFAZOLIN SODIUM 2 G/100ML
2 INJECTION, SOLUTION INTRAVENOUS
Status: COMPLETED | OUTPATIENT
Start: 2021-11-18 | End: 2021-11-18

## 2021-11-18 RX ORDER — GABAPENTIN 100 MG/1
100 CAPSULE ORAL
Status: DISCONTINUED | OUTPATIENT
Start: 2021-11-18 | End: 2021-11-18 | Stop reason: HOSPADM

## 2021-11-18 RX ORDER — DEXTROSE MONOHYDRATE, SODIUM CHLORIDE, AND POTASSIUM CHLORIDE 50; 1.49; 4.5 G/1000ML; G/1000ML; G/1000ML
INJECTION, SOLUTION INTRAVENOUS CONTINUOUS
Status: DISCONTINUED | OUTPATIENT
Start: 2021-11-18 | End: 2021-11-19

## 2021-11-18 RX ORDER — ACETAMINOPHEN 325 MG/1
975 TABLET ORAL EVERY 8 HOURS
Status: COMPLETED | OUTPATIENT
Start: 2021-11-18 | End: 2021-11-21

## 2021-11-18 RX ORDER — ACETAMINOPHEN 325 MG/1
650 TABLET ORAL EVERY 4 HOURS PRN
Status: DISCONTINUED | OUTPATIENT
Start: 2021-11-21 | End: 2021-11-18

## 2021-11-18 RX ORDER — EPHEDRINE SULFATE 50 MG/ML
INJECTION, SOLUTION INTRAMUSCULAR; INTRAVENOUS; SUBCUTANEOUS PRN
Status: DISCONTINUED | OUTPATIENT
Start: 2021-11-18 | End: 2021-11-18

## 2021-11-18 RX ORDER — HYDROXYZINE HYDROCHLORIDE 10 MG/1
10 TABLET, FILM COATED ORAL EVERY 6 HOURS PRN
Status: DISCONTINUED | OUTPATIENT
Start: 2021-11-18 | End: 2021-11-22 | Stop reason: HOSPADM

## 2021-11-18 RX ORDER — LIDOCAINE HYDROCHLORIDE 20 MG/ML
INJECTION, SOLUTION INFILTRATION; PERINEURAL PRN
Status: DISCONTINUED | OUTPATIENT
Start: 2021-11-18 | End: 2021-11-18

## 2021-11-18 RX ORDER — CEFAZOLIN SODIUM 2 G/100ML
2 INJECTION, SOLUTION INTRAVENOUS SEE ADMIN INSTRUCTIONS
Status: DISCONTINUED | OUTPATIENT
Start: 2021-11-18 | End: 2021-11-18 | Stop reason: HOSPADM

## 2021-11-18 RX ORDER — AMOXICILLIN 250 MG
2 CAPSULE ORAL 2 TIMES DAILY
Status: DISCONTINUED | OUTPATIENT
Start: 2021-11-18 | End: 2021-11-22 | Stop reason: HOSPADM

## 2021-11-18 RX ORDER — ACETAMINOPHEN 325 MG/1
650 TABLET ORAL EVERY 4 HOURS PRN
Status: DISCONTINUED | OUTPATIENT
Start: 2021-11-21 | End: 2021-11-22 | Stop reason: HOSPADM

## 2021-11-18 RX ORDER — PROPOFOL 10 MG/ML
INJECTION, EMULSION INTRAVENOUS PRN
Status: DISCONTINUED | OUTPATIENT
Start: 2021-11-18 | End: 2021-11-18

## 2021-11-18 RX ORDER — FENTANYL CITRATE 50 UG/ML
INJECTION, SOLUTION INTRAMUSCULAR; INTRAVENOUS PRN
Status: DISCONTINUED | OUTPATIENT
Start: 2021-11-18 | End: 2021-11-18

## 2021-11-18 RX ORDER — MULTIPLE VITAMINS W/ MINERALS TAB 9MG-400MCG
1 TAB ORAL
Status: DISCONTINUED | OUTPATIENT
Start: 2021-11-19 | End: 2021-11-22 | Stop reason: HOSPADM

## 2021-11-18 RX ADMIN — HYDROMORPHONE HYDROCHLORIDE 0.2 MG: 1 INJECTION, SOLUTION INTRAMUSCULAR; INTRAVENOUS; SUBCUTANEOUS at 18:15

## 2021-11-18 RX ADMIN — HYDROMORPHONE HYDROCHLORIDE 0.2 MG: 1 INJECTION, SOLUTION INTRAMUSCULAR; INTRAVENOUS; SUBCUTANEOUS at 18:05

## 2021-11-18 RX ADMIN — LABETALOL HYDROCHLORIDE 10 MG: 5 INJECTION, SOLUTION INTRAVENOUS at 20:14

## 2021-11-18 RX ADMIN — ACETAMINOPHEN 975 MG: 325 TABLET, FILM COATED ORAL at 19:47

## 2021-11-18 RX ADMIN — ROCURONIUM BROMIDE 20 MG: 50 INJECTION, SOLUTION INTRAVENOUS at 13:20

## 2021-11-18 RX ADMIN — CEFAZOLIN 1 G: 1 INJECTION, POWDER, FOR SOLUTION INTRAMUSCULAR; INTRAVENOUS at 23:59

## 2021-11-18 RX ADMIN — POTASSIUM CHLORIDE 40 MEQ: 750 TABLET, EXTENDED RELEASE ORAL at 23:59

## 2021-11-18 RX ADMIN — FENTANYL CITRATE 25 MCG: 50 INJECTION INTRAMUSCULAR; INTRAVENOUS at 17:30

## 2021-11-18 RX ADMIN — PHENYLEPHRINE HYDROCHLORIDE 100 MCG: 10 INJECTION INTRAVENOUS at 15:59

## 2021-11-18 RX ADMIN — HYDROMORPHONE HYDROCHLORIDE 0.2 MG: 1 INJECTION, SOLUTION INTRAMUSCULAR; INTRAVENOUS; SUBCUTANEOUS at 18:00

## 2021-11-18 RX ADMIN — PROPOFOL 20 MG: 10 INJECTION, EMULSION INTRAVENOUS at 14:51

## 2021-11-18 RX ADMIN — HYDROMORPHONE HYDROCHLORIDE 0.5 MG: 1 INJECTION, SOLUTION INTRAMUSCULAR; INTRAVENOUS; SUBCUTANEOUS at 12:50

## 2021-11-18 RX ADMIN — DEXAMETHASONE SODIUM PHOSPHATE 8 MG: 4 INJECTION, SOLUTION INTRAMUSCULAR; INTRAVENOUS at 12:47

## 2021-11-18 RX ADMIN — PROCHLORPERAZINE EDISYLATE 10 MG: 5 INJECTION INTRAMUSCULAR; INTRAVENOUS at 18:06

## 2021-11-18 RX ADMIN — ROCURONIUM BROMIDE 20 MG: 50 INJECTION, SOLUTION INTRAVENOUS at 14:14

## 2021-11-18 RX ADMIN — HYDROMORPHONE HYDROCHLORIDE 0.2 MG: 1 INJECTION, SOLUTION INTRAMUSCULAR; INTRAVENOUS; SUBCUTANEOUS at 17:49

## 2021-11-18 RX ADMIN — TRANEXAMIC ACID 2.22 G: 100 INJECTION, SOLUTION INTRAVENOUS at 12:21

## 2021-11-18 RX ADMIN — PHENYLEPHRINE HYDROCHLORIDE 50 MCG: 10 INJECTION INTRAVENOUS at 14:29

## 2021-11-18 RX ADMIN — POTASSIUM CHLORIDE, DEXTROSE MONOHYDRATE AND SODIUM CHLORIDE: 150; 5; 450 INJECTION, SOLUTION INTRAVENOUS at 22:02

## 2021-11-18 RX ADMIN — DOCUSATE SODIUM 50MG AND SENNOSIDES 8.6MG 2 TABLET: 8.6; 5 TABLET, FILM COATED ORAL at 22:02

## 2021-11-18 RX ADMIN — PROPOFOL 50 MG: 10 INJECTION, EMULSION INTRAVENOUS at 11:35

## 2021-11-18 RX ADMIN — MIDAZOLAM 2 MG: 1 INJECTION INTRAMUSCULAR; INTRAVENOUS at 11:20

## 2021-11-18 RX ADMIN — METHOCARBAMOL 1000 MG: 500 TABLET ORAL at 20:00

## 2021-11-18 RX ADMIN — SUGAMMADEX 160 MG: 100 INJECTION, SOLUTION INTRAVENOUS at 16:37

## 2021-11-18 RX ADMIN — PROPOFOL 50 MG: 10 INJECTION, EMULSION INTRAVENOUS at 11:32

## 2021-11-18 RX ADMIN — ROCURONIUM BROMIDE 30 MG: 50 INJECTION, SOLUTION INTRAVENOUS at 12:27

## 2021-11-18 RX ADMIN — SCOPALAMINE 1 PATCH: 1 PATCH, EXTENDED RELEASE TRANSDERMAL at 10:25

## 2021-11-18 RX ADMIN — HYDROMORPHONE HYDROCHLORIDE 0.5 MG: 1 INJECTION, SOLUTION INTRAMUSCULAR; INTRAVENOUS; SUBCUTANEOUS at 12:07

## 2021-11-18 RX ADMIN — SODIUM CHLORIDE, POTASSIUM CHLORIDE, SODIUM LACTATE AND CALCIUM CHLORIDE: 600; 310; 30; 20 INJECTION, SOLUTION INTRAVENOUS at 19:48

## 2021-11-18 RX ADMIN — PHENYLEPHRINE HYDROCHLORIDE 50 MCG: 10 INJECTION INTRAVENOUS at 14:43

## 2021-11-18 RX ADMIN — Medication 5 MG: at 14:39

## 2021-11-18 RX ADMIN — Medication 2 G: at 16:09

## 2021-11-18 RX ADMIN — PROPOFOL 35 MCG/KG/MIN: 10 INJECTION, EMULSION INTRAVENOUS at 12:08

## 2021-11-18 RX ADMIN — Medication 5 MG: at 14:37

## 2021-11-18 RX ADMIN — SODIUM CHLORIDE, POTASSIUM CHLORIDE, SODIUM LACTATE AND CALCIUM CHLORIDE: 600; 310; 30; 20 INJECTION, SOLUTION INTRAVENOUS at 14:45

## 2021-11-18 RX ADMIN — FENTANYL CITRATE 25 MCG: 50 INJECTION INTRAMUSCULAR; INTRAVENOUS at 17:15

## 2021-11-18 RX ADMIN — HYDROMORPHONE HYDROCHLORIDE 0.5 MG: 1 INJECTION, SOLUTION INTRAMUSCULAR; INTRAVENOUS; SUBCUTANEOUS at 15:41

## 2021-11-18 RX ADMIN — ONDANSETRON 4 MG: 2 INJECTION INTRAMUSCULAR; INTRAVENOUS at 16:02

## 2021-11-18 RX ADMIN — SODIUM CHLORIDE, POTASSIUM CHLORIDE, SODIUM LACTATE AND CALCIUM CHLORIDE: 600; 310; 30; 20 INJECTION, SOLUTION INTRAVENOUS at 11:39

## 2021-11-18 RX ADMIN — TRANEXAMIC ACID 10 MG/KG/HR: 100 INJECTION, SOLUTION INTRAVENOUS at 12:44

## 2021-11-18 RX ADMIN — KETAMINE HYDROCHLORIDE 5 MG/HR: 100 INJECTION, SOLUTION, CONCENTRATE INTRAMUSCULAR; INTRAVENOUS at 18:19

## 2021-11-18 RX ADMIN — POTASSIUM CHLORIDE 10 MEQ: 7.46 INJECTION, SOLUTION INTRAVENOUS at 21:16

## 2021-11-18 RX ADMIN — ONDANSETRON 4 MG: 2 INJECTION INTRAMUSCULAR; INTRAVENOUS at 17:38

## 2021-11-18 RX ADMIN — PHENYLEPHRINE HYDROCHLORIDE 50 MCG: 10 INJECTION INTRAVENOUS at 15:27

## 2021-11-18 RX ADMIN — MIDAZOLAM 2 MG: 1 INJECTION INTRAMUSCULAR; INTRAVENOUS at 11:25

## 2021-11-18 RX ADMIN — LIDOCAINE HYDROCHLORIDE 100 MG: 20 INJECTION, SOLUTION INFILTRATION; PERINEURAL at 11:35

## 2021-11-18 RX ADMIN — ROCURONIUM BROMIDE 20 MG: 50 INJECTION, SOLUTION INTRAVENOUS at 15:03

## 2021-11-18 RX ADMIN — ATORVASTATIN CALCIUM 80 MG: 40 TABLET, FILM COATED ORAL at 23:59

## 2021-11-18 RX ADMIN — PHENYLEPHRINE HYDROCHLORIDE 100 MCG: 10 INJECTION INTRAVENOUS at 14:33

## 2021-11-18 RX ADMIN — FENTANYL CITRATE 100 MCG: 50 INJECTION, SOLUTION INTRAMUSCULAR; INTRAVENOUS at 11:27

## 2021-11-18 RX ADMIN — GABAPENTIN 600 MG: 300 CAPSULE ORAL at 19:47

## 2021-11-18 RX ADMIN — FENTANYL CITRATE 25 MCG: 50 INJECTION INTRAMUSCULAR; INTRAVENOUS at 17:22

## 2021-11-18 RX ADMIN — PHENYLEPHRINE HYDROCHLORIDE 0.2 MCG/KG/MIN: 10 INJECTION INTRAVENOUS at 14:31

## 2021-11-18 RX ADMIN — FENTANYL CITRATE 50 MCG: 50 INJECTION, SOLUTION INTRAMUSCULAR; INTRAVENOUS at 14:09

## 2021-11-18 RX ADMIN — GABAPENTIN 300 MG: 100 CAPSULE ORAL at 10:26

## 2021-11-18 RX ADMIN — PROPOFOL 50 MG: 10 INJECTION, EMULSION INTRAVENOUS at 12:42

## 2021-11-18 RX ADMIN — OXYCODONE HYDROCHLORIDE 5 MG: 5 TABLET ORAL at 20:45

## 2021-11-18 RX ADMIN — POTASSIUM CHLORIDE 10 MEQ: 7.46 INJECTION, SOLUTION INTRAVENOUS at 19:36

## 2021-11-18 RX ADMIN — FENTANYL CITRATE 50 MCG: 50 INJECTION, SOLUTION INTRAMUSCULAR; INTRAVENOUS at 13:12

## 2021-11-18 RX ADMIN — FAMOTIDINE 20 MG: 20 TABLET ORAL at 22:02

## 2021-11-18 RX ADMIN — ROCURONIUM BROMIDE 50 MG: 50 INJECTION, SOLUTION INTRAVENOUS at 11:35

## 2021-11-18 RX ADMIN — PROPOFOL 20 MG: 10 INJECTION, EMULSION INTRAVENOUS at 15:38

## 2021-11-18 RX ADMIN — PHENYLEPHRINE HYDROCHLORIDE 100 MCG: 10 INJECTION INTRAVENOUS at 14:36

## 2021-11-18 RX ADMIN — PROPOFOL 40 MCG/KG/MIN: 10 INJECTION, EMULSION INTRAVENOUS at 14:51

## 2021-11-18 RX ADMIN — FENTANYL CITRATE 25 MCG: 50 INJECTION INTRAMUSCULAR; INTRAVENOUS at 17:35

## 2021-11-18 RX ADMIN — Medication 2 G: at 12:12

## 2021-11-18 RX ADMIN — HYDROMORPHONE HYDROCHLORIDE 0.2 MG: 1 INJECTION, SOLUTION INTRAMUSCULAR; INTRAVENOUS; SUBCUTANEOUS at 17:54

## 2021-11-18 ASSESSMENT — ACTIVITIES OF DAILY LIVING (ADL)
ADLS_ACUITY_SCORE: 10
ADLS_ACUITY_SCORE: 6
ADLS_ACUITY_SCORE: 10

## 2021-11-18 ASSESSMENT — MIFFLIN-ST. JEOR: SCORE: 1297.63

## 2021-11-18 NOTE — ANESTHESIA CARE TRANSFER NOTE
Patient: Nadira Perez    Procedure: Procedure(s):  Part 1: Oblique Anterior Interbody Fusion at Lumbar 5 to sacral 1 with use of Bone Morphogenic Protein,  Open Posterior Instrumented Spinal Fusion at Lumbar 5 to Sacral 1, with grimm aguayo osteotomy, use of O-Arm/Stealth       Diagnosis: Degenerative scoliosis in adult patient [M41.80]  Spondylolisthesis of lumbar region [M43.16]  Spinal stenosis of lumbar region with neurogenic claudication [M48.062]  Diagnosis Additional Information: No value filed.    Anesthesia Type:   General     Note:    Oropharynx: oropharynx clear of all foreign objects and spontaneously breathing  Level of Consciousness: awake  Oxygen Supplementation: face mask  Level of Supplemental Oxygen (L/min / FiO2): 6  Independent Airway: airway patency satisfactory and stable  Dentition: dentition unchanged  Vital Signs Stable: post-procedure vital signs reviewed and stable  Report to RN Given: handoff report given  Patient transferred to: PACU    Handoff Report: Identifed the Patient, Identified the Reponsible Provider, Reviewed the pertinent medical history, Discussed the surgical course, Reviewed Intra-OP anesthesia mangement and issues during anesthesia, Set expectations for post-procedure period and Allowed opportunity for questions and acknowledgement of understanding      Vitals:  Vitals Value Taken Time   BP     Temp     Pulse     Resp     SpO2         Electronically Signed By: MERCEDES Harper CRNA  November 18, 2021  5:27 PM

## 2021-11-18 NOTE — ANESTHESIA PROCEDURE NOTES
Arterial Line Procedure Note    Pre-Procedure   Staff -        Anesthesiologist:  Adela Hollis MD       Performed By: anesthesiologist       Location: OR       Pre-Anesthestic Checklist: patient identified, IV checked, risks and benefits discussed, informed consent, monitors and equipment checked, pre-op evaluation and at physician/surgeon's request  Timeout:       Correct Patient: Yes        Correct Procedure: Yes        Correct Site: Yes        Correct Position: Yes   Procedure   Procedure: arterial line       Laterality: right       Insertion Site: radial.  Sterile Prep        Standard elements of sterile barrier followed       Skin prep: Chloraprep  Insertion/Injection        Technique: Seldinger Technique        Catheter Type/Size: 20 G, 12 cm  Narrative        Tegaderm dressing used.       Complications: None apparent,        Arterial waveform: Yes        IBP within 10% of NIBP: Yes

## 2021-11-18 NOTE — OP NOTE
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    1. Oblique Anterior lumbar interbody fusion via anterior retroperitoneal approach to L5-S1.  2. Placement intervertebral prosthetic device, L5-S1, with Nuvasive PEEK cage.  3. Use of large BMP for fusion, L5-S1.  4. L5 through S1 Morgan Del Cid Osteotomies for complete neurologic decompression and regional deformity correction.  5. L5 through S1 Posterior Spinal Fusion.  6. L5 through S1 Posterior Spinal Instrumentation, Medtronic Solera.  7. [place] and use of Local Autograft.  8. Use of O-Arm navigational guidance.  9. Use of Allograft  10. Left Jr-laminectomy of L5    CO-SURGEON: Dr. Becca Moreno.  (The co-surgeon assisted with the exposure for #1 above.  The remainder of the procedure was performed independently).    Primary Surgeon: Serge Ramirez MD    FIRST ASSISTANT: Francesco Martinez, PGY4    ANESTHESIA: General Endotracheal    COMPLICATIONS:  None.    SPECIMENS: None.    ESTIMATED BLOOD LOSS: 250cc    INDICATIONS:                          Nadira Perez is a 71 year old female with debilitating symptoms from their pathology at L5-S1.  The patient elected surgical treatment, and understood the indications for this surgery, as well as its risks, benefits, and alternatives. We reviewed the risks and benefits of the surgery in detail. The risks include, but are not limited to, the general risks associated with anesthesia, including death, pulmonary embolism, DVT, stroke,  myocardial infarction, pneumonia, and urinary tract infection. Additional risks specific to the surgery include the risk of infection, failure to heal (non-union), dural tear with resultant CSF leak which might necessitate placement of a drain or revision surgery or could result in headaches, nerve injury resulting in weakness or paralysis, risk of adjacent segment disease, the risks of vascular injury resulting in severe or possibly uncontrollable bleeding, need for revision surgery in the future due to one of the above issues, or risk of incomplete symptom relief.   Nadira Perez understands the risks of the surgery and wishes to proceed.     DESCRIPTION OF PROCEDURE:           Nadira Perez was taken to the operating  room, where the Anesthesiology Service induced satisfactory general anesthesia.  Ancef was given IV.  Venous thromboembolic prophylaxis  was performed with sequential devices placed on the calves bilaterally.  Fong catheter was placed under standard sterile techniques. A bump was placed under the pelvis and the arms were secured.  All pressure points were well padded.  The anterior abdomen was prepped and draped in its entirety in the usual sterile fashion. We then held a multidisciplinary time out in which we verified the patient, procedure, antibiotics, and operative plan.  All team members were in agreement.    Our vascular colleagues started the approach for the ALIF at L5-S1.  Please see their dictation for the details of the approach.  I assisted with the approach and in obtaining the exposure.  Once I deemed the exposure adequate, I placed a needle in the disk space, and obtained an AP and lateral fluoroscopic image to confirm the level.  I repositioned the retractors to protect the vessels and then performed an annulotomy with a 10 blade and a complete diskectomy using Marcos curettes and pituitary, preparing the endplates for fusion, anterior to posterior into the width the body.   We then serially trialed and selected a 18 mm height spacer with a small -sized footprint with 18 degrees lordosis.  This was filled with a medium kit of Bone Morphogenic Protein for fusion, impacted into place under fluoroscopic guidance and a single screw was placed in the L5 body with good purchase.      We copiously irrigated and then our vascular colleagues returned for the closure.  We obtained final fluoroscopic images and the implants were in good position with no obvious complications.      After anterior closure, the patient was placed prone on an open OSI frame with the abdomen hanging free and all bony prominences well padded.  The low back was then prepped and draped in its entirety in the usual sterile fashion. We then held a multidisciplinary time out in which we verified the patient, procedure, antibiotics, and operative plan.  All team members were in agreement.    The incision was carried out from L5 to S1 with subperiosteal dissection out to the tips of the transverse processes. Intraoperative radiographic localization of the levels was again confirmed using fluoroscopy.      We then turned our attention to the placement of pedicle screws.  The O-Arm was brought in and draped.  The reference frame was attached to the spinous process at S1.  We then performed our O-Arm spin.  After appropriate verification, the screws were placed at each level bilaterally from L5 through S1 in the following fashion: We used the clawfoot to identify the starting point.  A high-speed bur was used to penetrate the dorsal cortex.  We then used the gold handle pedicle probe to cannulate the pedicle while referencing the navigation screen for guidance.  The navigation system was used to measure the pedicle diameter and length, and an appropriate Medtronic Solera screw size was chosen.  We under tapped by 1mm.  The screw was then placed under manual power.  This was done bilaterally at each of the instrumented levels.       The O-Arm was brought back in and we performed a verification spin.  The resultant CT showed the instrumentation to be completely intra-osseous with no obvious breaches or malposition.  This also verified that the correct level had been operated upon.     We then turned our attention to the interbody fusion and Smith-Warren osteotomy portion of the procedure.  I started at the  L5 and S1 levels.  I used a narrow rongure to remove the interspinous ligament.  I then used an osteotome to remove the bilateral inferior articular processes.  I then split the ligamentum flavum in the midline.  I used a Kerrison to resect it in a piecemeal fashion.  We carried the dissection out through the foramen I remove the superior articular process down flush with the caudal pedicle.  This completed the removal to bilateral facet joints, which is a Smith-Warren osteotomy, which allowed for complete neurologic decompression as well as regional deformity correction at the end of the case.     I then worked into the canal.  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  As noted above, this severely distorted the regional neurologic anatomy and I spent quite a bit of time defining this and making sure the neurologic elements were completely free.  The mass itself could not be resected and I was not able to scoop any of it out.  So my strategy was to decompress it dorsally.  I removed the remaining left parvez-lamina of L5 and all of the overhanging bone on the left side using a kerrison to remove it down flush with the medial L5 pedicle up to the L4-5 disc and interspace and was able to trace out the entire left L5 root from its shoulder down past the exit out the foramen and it seemed quite free.      The rods and set caps were then provisionally placed.  I did compress across the rods under fluoroscopic guidance.  I dialed in the lordosis correction to the appropriate amount and  this completed our regional deformity correction.  The set caps were then tightened down.  I took our final images and I was satisfied with the alignment.    I then decorticated the remaining midline laminar bone between L5 and S1 levels.  This is packed with the remaining local autograft as well as crushed cancellous allograft as a bone extender, to complete the posterior fusion from L5 and S1 levels.    We then achieved hemostasis.  A RADHA was placed deep and a hemovac drain was placed above the fascia and 1 gram of vancomycin powder was placed into the wound.  We closed the wound in multiple layers of interrupted Vicryl, with monocryl and dermabond for the skin.  Upon closure, the patient was then transferred to a hospital bed and taken to recovery after extubation in stable condition.    I was present and scrubbed for the critical portions of the procedure, including patient positioning, the neurologic decompression, placement of instrumentation, performance of fusion and grafting, and verification of the instrumentation.    Serge Ramirez MD

## 2021-11-18 NOTE — PROGRESS NOTES
SPIRITUAL HEALTH SERVICES  SPIRITUAL ASSESSMENT Progress Note  [facility] ([facility] [place]) Pre op     REFERRAL SOURCE: Pt request     Pt was receptive to visit, asked for guided meditation focusing on breath and relaxation, which was provided.      PLAN: SHS will remain available     hCelsi Kim  Pager: [phone]

## 2021-11-18 NOTE — BRIEF OP NOTE
Brief Operative Note    Preop Dx:   Degenerative scoliosis in adult patient [M41.80]  Spondylolisthesis of lumbar region [M43.16]  Spinal stenosis of lumbar region with neurogenic claudication [M48.062]  Post op Dx:   Same  Procedure:    Procedure(s):  Part 1: Oblique Anterior Interbody Fusion at Lumbar 5 to sacral 1 with use of Bone Morphogenic Protein,  Open Posterior Instrumented Spinal Fusion at Lumbar 5 to Sacral 1, with grimm aguayo osteotomy, use of O-Arm/Stealth  Surgeon:     Dr. Ismael Ramirez   Assistants:    Francesco Martinez, PGY4  Anesthesia:   General  EBL:    200mL with 0mL returned via cellsaver  Total IV Fluids:  See Anesthesia Record  Specimens:   None  Findings:   See Operative Dictation  Complications:  None.    Assessment and Plan: Nadira Perez is a 71 year old female with PMH including HTN, HLD, prediabetes, hypothyroid now s/p L5-S1 PSF and OLIF with Dr. Ramirez on 11/18/2021.     Ortho Primary, Medicine Comanaging, appreciate cares  Activity:   - Up with assist until independent. No excessive bending or twisting. No lifting >10 lbs x 6 weeks.   - If Barbara lift required for mobilization, please use high-back sling rather than universal sling. This is to minimize spine flexion.  Weight bearing status: WBAT.  Pain management:   - IV ketamine  - Transition to PO narcotics as tolerated. No NSAIDs x 3 months.   Antibiotics: Ancef until drain is out  Diet: Begin with clear fluids and progress diet as tolerated.   DVT prophylaxis: SCDs only. No chemical DVT ppx needed.  Imaging: XR Upright PTDC - ordered.  Labs: Hgb POD#1.  Bracing/Splinting: None.  Dressings: 4x4/tegaderm.  Drains: Document output per shift, will be discontinued at Orthopedic Surgery discretion.  Fong catheter: Remove POD#1.  Physical Therapy/Occupational Therapy: Eval and treat.  Cultures: none.    Consults: Hospitalist, PT/OT, SW.  Follow-up: Clinic with Dr. Ramirez in 6 weeks with repeat x-rays.   Disposition: Pending progress with  therapies, pain control on orals, and medical stability, anticipate discharge to home v TCU on POD #3-6.      Francesco Martinez MD  Orthopaedic Surgery, PGY-4  Pager: [phone]      Thank you for allowing me to participate in this patient's care. Please page me directly any questions/concerns.   Text page via [facility] Paging/Directory    If there is no response, if it is a weekend, or if it is during evening hours, please page the orthopaedic surgery resident on call via [facility] Paging/Directory    Implants:   Implant Name Type Inv. Item Serial No.  Lot No. LRB No. Used Action   GRAFT BONE INFUSE BMP MED [id number] - [id number]  GRAFT BONE INFUSE BMP MED [id number] [employer] [id number] N/A 1 Implanted   SPACER SPINAL [id number] SOVEREIGN 18D SM CVR PLT [id number] - [id number] Metallic Hardware/[place] SPACER SPINAL [id number] SOVEREIGN 18D SM CVR PLT [id number]  [employer] 79FC N/A 1 Implanted   IMP SCR MEDT SOVEREIGN SELF TAP 5.5X20MM [id number] - [id number] Metallic Hardware/[place] IMP SCR MEDT SOVEREIGN SELF TAP 5.5X20MM [id number]  [employer]  N/A 1 Implanted   IMP SCR SET MEDT SOLERA BREAK OFF 5.5MM TI [id number] - [id number] Metallic Hardware/[place] IMP SCR SET MEDT SOLERA BREAK OFF 5.5MM TI [id number]  [employer]  N/A 4 Implanted   IMP SCR MEDT 5.5/6.0MM SOLERA 7.5X40MM MA [id number] - [id number] Metallic Hardware/[place] IMP SCR MEDT 5.5/6.0MM SOLERA 7.5X40MM MA [id number]  [employer]  N/A 1 Implanted   IMP SCR MEDT 5.5/6.0MM SOLERA 7.5X60MM MA [id number] - [id number] Metallic Hardware/[place] IMP SCR MEDT 5.5/6.0MM SOLERA 7.5X60MM MA [id number]  [employer]  N/A 1 Implanted   IMP SCR MEDT 5.5/6.0MM SOLERA 7.5X45MM MA [id number] - [id number] Metallic Hardware/[place] IMP SCR MEDT 5.5/6.0MM SOLERA 7.5X45MM MA [id number]  [employer] [facility]  N/A 1 Implanted   IMP SCR MEDT 5.5/6.0MM SOLERA 6.5X55MM MA [id number] - [id number] Metallic Hardware/[place] IMP SCR MEDT 5.5/6.0MM SOLERA  6.5X55MM MA [id number]  [employer]  N/A 1 Implanted   IMP SARAH MEDT SOLERA CVD 5.5X40MM CHR [id number] - [id number] Metallic Hardware/[place] IMP SARAH MEDT SOLERA CVD 5.5X40MM CHR [id number]  [employer]  N/A 2 Implanted

## 2021-11-18 NOTE — OP NOTE
DATE OF OPERATION: November 18, 2021      CO-SURGEONS:  Serge Ramirez MD; Becca Moreno MD.       ASSISTANT: Christian PGY4      ANESTHESIA:  General endotracheal anesthesia.       PREOPERATIVE DIAGNOSIS:  L5-S1 spondylolisthesis      POSTOPERATIVE DIAGNOSIS: L5-S1 spondylolisthesis      PROCEDURE:      L5-S1 oblique lumbar intradiscal fusion (OLIF)        ESTIMATED BLOOD LOSS:  50 mL.       COMPLICATIONS:  None apparent.       INDICATIONS:  This 71 year old female saw Dr. Ramirez of Spine Surgery for evaluation.  Treatment was recommended, which included anterior exposure for the  L5-S1 disk spaces.  I was consulted for exposure via retroperitoneal exposure.  I met the patient preoperatively.  We discussed the risks and benefits. She was willing to proceed.       DESCRIPTION OF PROCEDURE:  The patient was brought to the operating room, placed in the supine position.  After monitors were placed general anesthesia was achieved.  The patient was placed in a right lateral decubitus position. The L5-S1 disc space was identified under fluoroscopy. A left paramedian incision was created and the anterior rectus sheath incised along with the external oblique fascia. The retroperitoneal space was entered by going directly through the fibers of the external oblique muscle. The peritoneum was reflected off of the pelvic side wall and reflected toward the right.  A self-retaining radiolucent retractor was then placed.  The L5-S1 disk space was then encountered and the middle sacral artery divided with the cautery.  The disk space between L5 and S1, the L5 vertebral body, and S1 vertebral body were then cleared cephalad and caudally as well as laterally.  The disk and fusion portion as well as the instrumentation for the L5-S1 disk space will then be dictated by Dr. Ramirez.   The retractors were released and hemostasis appeared adequate. The area was again inspected for hemostasis and when this was assured, the external oblique and  anterior rectus fascia were was closed with looped #0 PDS.  Running 3-0 Vicryl suture was then used  for closure of sarah's fascia followed by 4-0 subcuticular Monocryl followed by skin glue.  Prior to closure, 30 mL of 0.25% Marcaine with epinephrine was injected into the incision.  At this point, the remainder of the operation and fusion will be dictated by Dr. Ramirez.  When I left the operating room, the patient was hemodynamically stable with palpable DP pulses bilaterally.          Becca Moreno MD, FACS, RPVI  Professor and Chief, Vascular and Endovascular Surgery  [facility]  [email or web address]

## 2021-11-18 NOTE — OR NURSING
PACU to Inpatient Nursing Handoff    Patient Nadira Perez is a 71 year old female who speaks English.   Procedure Procedure(s):  Part 1: Oblique Anterior Interbody Fusion at Lumbar 5 to sacral 1 with use of Bone Morphogenic Protein,  Open Posterior Instrumented Spinal Fusion at Lumbar 5 to Sacral 1, with grimm aguayo osteotomy, use of O-Arm/Stealth   Surgeon(s) Primary: Serge Ramirez MD  Assisting: Becca Moreno MD  Resident - Assisting: Francesco Martinez MD     Allergies   Allergen Reactions     Erythromycin Nausea     Penicillins Hives     Around age 6 - doesn't recall full reaction, mother told her it was hives.     Has tolerated cephalsporins.        Isolation  constant    Past Medical History   has a past medical history of Arthritis, Bone disease, Breast cancer (H), H/O kyphoplasty, Hearing problem, History of kidney stones, History of radiation therapy, Hyperlipemia, Hypertension, Hypopotassemia, Kidney problem, Lymph edema, Medullary sponge kidney, Osteopenia, PONV (postoperative nausea and vomiting), Reduced vision, Squamous cell skin cancer, and Thyroid disease.    Anesthesia General   Dermatome Level     Preop Meds Not applicable   Nerve block Not applicable   Intraop Meds dexamethasone (Decadron)  fentanyl (Sublimaze): 200 mcg total  hydromorphone (Dilaudid): 1.5 mg total  ondansetron (Zofran): last given at 4  Phenylephrine bolus and gtt stopped at 1612, Ephedrine, TXA bolus and gtt stopped [id number], LR 1700 ml   Local Meds No   Antibiotics cefazolin (Ancef) - last given at 2 g at 1212 and 2 g at 1609     Pain Patient Currently in Pain: yes   PACU meds  fentanyl (Sublimaze): 100 mcg (total dose) last given at last 25 mcg at 1735   hydromorphone (Dilaudid): 1 mg (total dose) last given at last 0.2 mg at 1815   ondansetron (Zofran): 4 mg (total dose) last given at 1738     prochlorperazine (Compazine): 10 mg (total dose) last given at 1806   Precedex 20 mcg IV given by MDA at 1806  Tylenol  975mg, Neurontin 600 mg, Robaxin 1000 mg  labetolol 10 mg IV   PCA / epidural Ketamine gtt 5 mg/hr started in PACU at 1825   Capnography  38, IPI 9-10   Telemetry  SR, now rare PVCs   Inpatient Telemetry Monitor Ordered? No        Labs Glucose Lab Results   Component Value Date     11/18/2021    GLC 99 11/05/2021    GLC 99 05/07/2021    GLC 98 05/07/2021       Hgb Lab Results   Component Value Date    HGB 11.5 11/05/2021    HGB 11.5 05/07/2021       INR Lab Results   Component Value Date    INR 0.85 08/20/2010      PACU Imaging Not applicable     Wound/Incision Incision/Surgical Site 11/18/21 Left Abdomen (Active)   Incision Assessment [facility] 11/18/21 1424   Closure Approximated;Liquid bandage;Sutures 11/18/21 1424   Incision Drainage Amount None 11/18/21 1424   Dressing Intervention New dressing applied 11/18/21 1424   Number of days: 0       Incision/Surgical Site 11/18/21 Posterior;Midline Lumbar spine (Active)   Incision Assessment [facility] 11/18/21 1629   Closure Liquid bandage 11/18/21 1629   Dressing Intervention Clean, dry, intact;New dressing applied 11/18/21 1629   Number of days: 0      CMS  numbness in bilat feet, faint. Warm, refill <3        Equipment ice pack   Other LDA       IV Access Peripheral IV 11/18/21 Left Foot (Active)   Number of days: 0       Peripheral IV 11/18/21 Right Lower forearm (Active)   Number of days: 0       Arterial Line 11/18/21 (Active)   Number of days: 0      Blood Products Not applicable  mL   Intake/Output Date 11/18/21 0700 - 11/19/21 0659   Shift 1372-2095 0657-0228 8477-8509 24 Hour Total   INTAKE   I.V. 1000 700  1700   IV Piggyback  20  20   Shift Total(mL/kg) 1000(13.51) 720(9.73)  1720(23.24)   OUTPUT   Urine 300 200  500   Blood  200  200   Shift Total(mL/kg) 300(4.05) 400(5.41)  700(9.46)   Weight (kg) 74 74 74 74      Drains / Fong Closed/Suction Drain 1 Left;Midline;Posterior Back Bulb 15 Kiswahili placed DEEP to the upper left of midline incision (Active)    Number of days: 0       Urethral Catheter Straight-tip 16 fr (Active)   Number of days: 0      Time of void PreOp Void Prior to Procedure: 0930 (11/18/21 0938)    PostOp  riddle 350    Diapered? No   Bladder Scan  NA- riddle   PO   300 water     Vitals    B/P: (!) 149/99  T: 99.5  F (37.5  C)    Temp src: Oral  P:  Pulse: 84 (11/18/21 0857)          R: 20  O2:  SpO2: 97 %    O2 Device: None (Room air) (11/18/21 0857)              Family/support present sister updated   Patient belongings  bag and suitcase   Patient transported on bed   DC meds/scripts (obs/outpt) Not applicable   Inpatient Pain Meds Released? Yes       Special needs/considerations None   Tasks needing completion None       Ilene Cruz, RN  ASCOM [id number]

## 2021-11-19 ENCOUNTER — APPOINTMENT (OUTPATIENT)
Dept: OCCUPATIONAL THERAPY | Facility: CLINIC | Age: 71
DRG: 454 | End: 2021-11-19
Attending: ORTHOPAEDIC SURGERY
Payer: COMMERCIAL

## 2021-11-19 ENCOUNTER — TELEPHONE (OUTPATIENT)
Dept: ORTHOPEDICS | Facility: CLINIC | Age: 71
End: 2021-11-19
Payer: COMMERCIAL

## 2021-11-19 ENCOUNTER — APPOINTMENT (OUTPATIENT)
Dept: PHYSICAL THERAPY | Facility: CLINIC | Age: 71
DRG: 454 | End: 2021-11-19
Attending: ORTHOPAEDIC SURGERY
Payer: COMMERCIAL

## 2021-11-19 LAB
ANION GAP SERPL CALCULATED.3IONS-SCNC: 5 MMOL/L (ref 3–14)
BASOPHILS # BLD AUTO: 0 10E3/UL (ref 0–0.2)
BASOPHILS NFR BLD AUTO: 0 %
BUN SERPL-MCNC: 14 MG/DL (ref 7–30)
CALCIUM SERPL-MCNC: 8.2 MG/DL (ref 8.5–10.1)
CHLORIDE BLD-SCNC: 108 MMOL/L (ref 94–109)
CO2 SERPL-SCNC: 27 MMOL/L (ref 20–32)
CREAT SERPL-MCNC: 0.78 MG/DL (ref 0.52–1.04)
EOSINOPHIL # BLD AUTO: 0 10E3/UL (ref 0–0.7)
EOSINOPHIL NFR BLD AUTO: 0 %
ERYTHROCYTE [DISTWIDTH] IN BLOOD BY AUTOMATED COUNT: 14.2 % (ref 10–15)
GFR SERPL CREATININE-BSD FRML MDRD: 78 ML/MIN/1.73M2
GLUCOSE BLD-MCNC: 134 MG/DL (ref 70–99)
HCT VFR BLD AUTO: 28.6 % (ref 35–47)
HGB BLD-MCNC: 9.4 G/DL (ref 11.7–15.7)
IMM GRANULOCYTES # BLD: 0.1 10E3/UL
IMM GRANULOCYTES NFR BLD: 1 %
LYMPHOCYTES # BLD AUTO: 1.1 10E3/UL (ref 0.8–5.3)
LYMPHOCYTES NFR BLD AUTO: 11 %
MCH RBC QN AUTO: 29 PG (ref 26.5–33)
MCHC RBC AUTO-ENTMCNC: 32.9 G/DL (ref 31.5–36.5)
MCV RBC AUTO: 88 FL (ref 78–100)
MONOCYTES # BLD AUTO: 1.2 10E3/UL (ref 0–1.3)
MONOCYTES NFR BLD AUTO: 12 %
NEUTROPHILS # BLD AUTO: 7.5 10E3/UL (ref 1.6–8.3)
NEUTROPHILS NFR BLD AUTO: 76 %
NRBC # BLD AUTO: 0 10E3/UL
NRBC BLD AUTO-RTO: 0 /100
PLATELET # BLD AUTO: 245 10E3/UL (ref 150–450)
POTASSIUM BLD-SCNC: 3.6 MMOL/L (ref 3.4–5.3)
RBC # BLD AUTO: 3.24 10E6/UL (ref 3.8–5.2)
SODIUM SERPL-SCNC: 140 MMOL/L (ref 133–144)
WBC # BLD AUTO: 9.9 10E3/UL (ref 4–11)

## 2021-11-19 PROCEDURE — 80048 BASIC METABOLIC PNL TOTAL CA: CPT | Performed by: STUDENT IN AN ORGANIZED HEALTH CARE EDUCATION/TRAINING PROGRAM

## 2021-11-19 PROCEDURE — 250N000013 HC RX MED GY IP 250 OP 250 PS 637: Performed by: PHYSICIAN ASSISTANT

## 2021-11-19 PROCEDURE — 250N000013 HC RX MED GY IP 250 OP 250 PS 637: Performed by: INTERNAL MEDICINE

## 2021-11-19 PROCEDURE — 258N000003 HC RX IP 258 OP 636

## 2021-11-19 PROCEDURE — 250N000013 HC RX MED GY IP 250 OP 250 PS 637: Performed by: STUDENT IN AN ORGANIZED HEALTH CARE EDUCATION/TRAINING PROGRAM

## 2021-11-19 PROCEDURE — 258N000003 HC RX IP 258 OP 636: Performed by: STUDENT IN AN ORGANIZED HEALTH CARE EDUCATION/TRAINING PROGRAM

## 2021-11-19 PROCEDURE — 250N000011 HC RX IP 250 OP 636: Performed by: ORTHOPAEDIC SURGERY

## 2021-11-19 PROCEDURE — 97161 PT EVAL LOW COMPLEX 20 MIN: CPT | Mod: GP

## 2021-11-19 PROCEDURE — 85025 COMPLETE CBC W/AUTO DIFF WBC: CPT | Performed by: STUDENT IN AN ORGANIZED HEALTH CARE EDUCATION/TRAINING PROGRAM

## 2021-11-19 PROCEDURE — 97165 OT EVAL LOW COMPLEX 30 MIN: CPT | Mod: GO

## 2021-11-19 PROCEDURE — 120N000002 HC R&B MED SURG/OB UMMC

## 2021-11-19 PROCEDURE — 36415 COLL VENOUS BLD VENIPUNCTURE: CPT | Performed by: STUDENT IN AN ORGANIZED HEALTH CARE EDUCATION/TRAINING PROGRAM

## 2021-11-19 PROCEDURE — 99232 SBSQ HOSP IP/OBS MODERATE 35: CPT | Performed by: INTERNAL MEDICINE

## 2021-11-19 PROCEDURE — 97116 GAIT TRAINING THERAPY: CPT | Mod: GP

## 2021-11-19 PROCEDURE — 97530 THERAPEUTIC ACTIVITIES: CPT | Mod: GP

## 2021-11-19 PROCEDURE — 97535 SELF CARE MNGMENT TRAINING: CPT | Mod: GO

## 2021-11-19 PROCEDURE — 250N000013 HC RX MED GY IP 250 OP 250 PS 637: Performed by: CLINICAL NURSE SPECIALIST

## 2021-11-19 RX ORDER — POLYETHYLENE GLYCOL 3350 17 G/17G
17 POWDER, FOR SOLUTION ORAL 2 TIMES DAILY
Status: DISCONTINUED | OUTPATIENT
Start: 2021-11-19 | End: 2021-11-21

## 2021-11-19 RX ORDER — CEFAZOLIN SODIUM 1 G/3ML
1 INJECTION, POWDER, FOR SOLUTION INTRAMUSCULAR; INTRAVENOUS ONCE
Status: COMPLETED | OUTPATIENT
Start: 2021-11-19 | End: 2021-11-19

## 2021-11-19 RX ORDER — SODIUM CHLORIDE 9 MG/ML
INJECTION, SOLUTION INTRAVENOUS
Status: COMPLETED
Start: 2021-11-19 | End: 2021-11-19

## 2021-11-19 RX ORDER — GABAPENTIN 300 MG/1
300 CAPSULE ORAL 3 TIMES DAILY
Status: DISCONTINUED | OUTPATIENT
Start: 2021-11-19 | End: 2021-11-21

## 2021-11-19 RX ADMIN — GABAPENTIN 300 MG: 300 CAPSULE ORAL at 13:20

## 2021-11-19 RX ADMIN — LISINOPRIL 20 MG: 10 TABLET ORAL at 13:20

## 2021-11-19 RX ADMIN — ATORVASTATIN CALCIUM 80 MG: 40 TABLET, FILM COATED ORAL at 20:24

## 2021-11-19 RX ADMIN — ACETAMINOPHEN 975 MG: 325 TABLET, FILM COATED ORAL at 20:25

## 2021-11-19 RX ADMIN — Medication 56 MCG: at 09:11

## 2021-11-19 RX ADMIN — DOCUSATE SODIUM 50MG AND SENNOSIDES 8.6MG 2 TABLET: 8.6; 5 TABLET, FILM COATED ORAL at 20:25

## 2021-11-19 RX ADMIN — OXYCODONE HYDROCHLORIDE 5 MG: 5 TABLET ORAL at 13:20

## 2021-11-19 RX ADMIN — OXYCODONE HYDROCHLORIDE 10 MG: 5 TABLET ORAL at 01:00

## 2021-11-19 RX ADMIN — FAMOTIDINE 20 MG: 20 TABLET ORAL at 20:25

## 2021-11-19 RX ADMIN — OXYCODONE HYDROCHLORIDE 5 MG: 5 TABLET ORAL at 20:25

## 2021-11-19 RX ADMIN — SODIUM CHLORIDE 500 ML: 9 INJECTION, SOLUTION INTRAVENOUS at 09:35

## 2021-11-19 RX ADMIN — OXYCODONE HYDROCHLORIDE 10 MG: 5 TABLET ORAL at 09:14

## 2021-11-19 RX ADMIN — GABAPENTIN 100 MG: 100 CAPSULE ORAL at 09:11

## 2021-11-19 RX ADMIN — FAMOTIDINE 20 MG: 20 TABLET ORAL at 09:11

## 2021-11-19 RX ADMIN — LISINOPRIL 20 MG: 10 TABLET ORAL at 00:00

## 2021-11-19 RX ADMIN — METHOCARBAMOL 1000 MG: 500 TABLET ORAL at 09:10

## 2021-11-19 RX ADMIN — METOPROLOL SUCCINATE 50 MG: 50 TABLET, EXTENDED RELEASE ORAL at 09:11

## 2021-11-19 RX ADMIN — GABAPENTIN 300 MG: 300 CAPSULE ORAL at 20:25

## 2021-11-19 RX ADMIN — OXYCODONE HYDROCHLORIDE 5 MG: 5 TABLET ORAL at 16:35

## 2021-11-19 RX ADMIN — AMLODIPINE BESYLATE 10 MG: 10 TABLET ORAL at 09:11

## 2021-11-19 RX ADMIN — METHOCARBAMOL 1000 MG: 500 TABLET ORAL at 18:26

## 2021-11-19 RX ADMIN — ACETAMINOPHEN 975 MG: 325 TABLET, FILM COATED ORAL at 04:21

## 2021-11-19 RX ADMIN — LISINOPRIL 20 MG: 10 TABLET ORAL at 22:57

## 2021-11-19 RX ADMIN — ACETAMINOPHEN 975 MG: 325 TABLET, FILM COATED ORAL at 12:06

## 2021-11-19 RX ADMIN — MULTIPLE VITAMINS W/ MINERALS TAB 1 TABLET: TAB at 12:07

## 2021-11-19 RX ADMIN — POTASSIUM CHLORIDE, DEXTROSE MONOHYDRATE AND SODIUM CHLORIDE: 150; 5; 450 INJECTION, SOLUTION INTRAVENOUS at 09:24

## 2021-11-19 RX ADMIN — POTASSIUM CHLORIDE 20 MEQ: 750 TABLET, EXTENDED RELEASE ORAL at 20:25

## 2021-11-19 RX ADMIN — POLYETHYLENE GLYCOL 3350 17 G: 17 POWDER, FOR SOLUTION ORAL at 20:24

## 2021-11-19 RX ADMIN — DOCUSATE SODIUM 50MG AND SENNOSIDES 8.6MG 2 TABLET: 8.6; 5 TABLET, FILM COATED ORAL at 09:10

## 2021-11-19 RX ADMIN — OXYCODONE HYDROCHLORIDE AND ACETAMINOPHEN 500 MG: 500 TABLET ORAL at 09:11

## 2021-11-19 RX ADMIN — POLYETHYLENE GLYCOL 3350 17 G: 17 POWDER, FOR SOLUTION ORAL at 09:11

## 2021-11-19 RX ADMIN — METHOCARBAMOL 1000 MG: 500 TABLET ORAL at 04:21

## 2021-11-19 RX ADMIN — OXYCODONE HYDROCHLORIDE 10 MG: 5 TABLET ORAL at 05:39

## 2021-11-19 RX ADMIN — OXYCODONE HYDROCHLORIDE 5 MG: 5 TABLET ORAL at 23:48

## 2021-11-19 RX ADMIN — POTASSIUM CHLORIDE 20 MEQ: 750 TABLET, EXTENDED RELEASE ORAL at 13:20

## 2021-11-19 RX ADMIN — CEFAZOLIN 1 G: 1 INJECTION, POWDER, FOR SOLUTION INTRAMUSCULAR; INTRAVENOUS at 09:48

## 2021-11-19 RX ADMIN — POTASSIUM CHLORIDE 20 MEQ: 750 TABLET, EXTENDED RELEASE ORAL at 09:11

## 2021-11-19 ASSESSMENT — ACTIVITIES OF DAILY LIVING (ADL)
ADLS_ACUITY_SCORE: 14
ADLS_ACUITY_SCORE: 15
ADLS_ACUITY_SCORE: 14
ADLS_ACUITY_SCORE: 15
ADLS_ACUITY_SCORE: 14
ADLS_ACUITY_SCORE: 15
ADLS_ACUITY_SCORE: 14
ADLS_ACUITY_SCORE: 15
ADLS_ACUITY_SCORE: 12
ADLS_ACUITY_SCORE: 18
ADLS_ACUITY_SCORE: 14
ADLS_ACUITY_SCORE: 14
ADLS_ACUITY_SCORE: 12
ADLS_ACUITY_SCORE: 14
ADLS_ACUITY_SCORE: 12
ADLS_ACUITY_SCORE: 15
ADLS_ACUITY_SCORE: 14

## 2021-11-19 NOTE — PROGRESS NOTES
"   11/19/21 0803   Quick Adds   Type of Visit Initial PT Evaluation       Present no   Language English   Living Environment   People in home alone   Current Living Arrangements house   Home Accessibility stairs to enter home   Number of Stairs, Main Entrance 2   Stair Railings, Main Entrance none   Transportation Anticipated family or friend will provide   Living Environment Comments Plans to d/c to breothers house who has 2 stairs upon entry with R rail.    Self-Care   Usual Activity Tolerance good   Current Activity Tolerance moderate   Regular Exercise Yes   Equipment Currently Used at Home cane, straight;walker, rolling   Disability/Function   Hearing Difficulty or Deaf yes   Patient's preferred means of communication verbal   Describe hearing loss bilateral hearing loss   Use of hearing assistive devices bilateral hearing aids   Were auxiliary aids offered? no   The following aids were provided; patient declined offer of auxiliary aids   Wear Glasses or Blind yes   Vision Management reading glasses   Concentrating, Remembering or Making Decisions Difficulty yes  (difficulty focusing on task, states \"high on painmeds\". )   Difficulty Communicating no   Walking or Climbing Stairs Difficulty yes   Walking or Climbing Stairs ambulation difficulty, requires equipment   Mobility Management uses cane for long distances   Dressing/Bathing Difficulty no   Toileting issues no   Doing Errands Independently Difficulty (such as shopping) yes   Fall history within last six months yes   Number of times patient has fallen within last six months 6   Change in Functional Status Since Onset of Current Illness/Injury yes   General Information   Onset of Illness/Injury or Date of Surgery 11/18/21   Referring Physician Dr. Ramirez   Patient/Family Therapy Goals Statement (PT) [facility] [place]   Pertinent History of Current Problem (include personal factors and/or comorbidities that impact the POC) 71 year " old female with PMH including HTN, HLD, prediabetes, hypothyroid now s/p L5-S1 PSF and OLIF with Dr. Ramirez on 11/18/2021.   Existing Precautions/Restrictions fall;spinal   Weight-Bearing Status - LUE full weight-bearing   Weight-Bearing Status - RUE full weight-bearing   Weight-Bearing Status - LLE weight-bearing as tolerated   Weight-Bearing Status - RLE weight-bearing as tolerated   Cognition   Orientation Status (Cognition) oriented x 4   Affect/Mental Status (Cognition) WFL   Follows Commands (Cognition) WFL   Safety Deficit (Cognition) impulsivity;safety precautions awareness;safety precautions follow-through/compliance   Cognitive Status Comments poor precautions awareness suspected to be secondary to pain medication.    Pain Assessment   Patient Currently in Pain Yes, see Vital Sign flowsheet   Integumentary/Edema   Integumentary/Edema no deficits were identifed   Posture    Posture Not impaired   Range of Motion (ROM)   ROM Comment Not formally assessed. Within functional limits based on todays mobility   Strength   Strength Comments Not formally assessed, demonstrates good bilateral quad activation and strong with functional mobility.    Bed Mobility   Comment (Bed Mobility) Mod verbal cueing and Steven for technique and maintaining precautions with log roll from supine <>sit.    Transfers   Transfer Safety Comments CGA using FWW with sit<>stand.    Gait/Stairs (Locomotion)   Comment (Gait/Stairs) Not performed today.    Balance   Balance Comments good seated and standing balance.    Sensory Examination   Sensory Perception Comments per patient, baseline peripheral neuropathy in bilateral LEs. denies any new numbness/tingling.    Clinical Impression   Criteria for Skilled Therapeutic Intervention yes, treatment indicated   PT Diagnosis (PT) impaired funcitonal mobility.    Influenced by the following impairments decreased strength, ROM, increased post op pain.   Functional limitations due to impairments bed  mobility, transfers, ambulation and stairs.    Clinical Presentation Stable/Uncomplicated   Clinical Presentation Rationale 71 year old female with PMH including HTN, HLD, prediabetes, hypothyroid now s/p L5-S1 PSF and OLIF with Dr. Ramirez on 11/18/2021.   Clinical Decision Making (Complexity) low complexity   Therapy Frequency (PT) 2x/day   Predicted Duration of Therapy Intervention (days/wks) 5 days   Planned Therapy Interventions (PT) bed mobility training;home exercise program;transfer training;stair training;gait training   Anticipated Equipment Needs at Discharge (PT)   (has FWW and SPC at home. )   Risk & Benefits of therapy have been explained evaluation/treatment results reviewed;care plan/treatment goals reviewed;risks/benefits reviewed;current/potential barriers reviewed   PT Discharge Planning    PT Discharge Recommendation (DC Rec) home with assist;home with outpatient physical therapy   PT Rationale for DC Rec anticipate safe to return home upon d/c.    PT Brief overview of current status  difficulty maintaining spinal precautions secondary to pain medications mod verbal cueing throughout to stay on task. Steven with bed mobility, CGA sit to stand using FWW, sitting and standing close SBA for impusivity.    Total Evaluation Time   Total Evaluation Time (Minutes) 15

## 2021-11-19 NOTE — PROGRESS NOTES
Ortho Post-op Check    Subjective:  Seen in PACU.  Moderate pain, appropriate for postop, working to get it under control w/ ketamine drip and methocarbamol.      Objective:   General:  Awake, participates in exam  Respiratory:  NLB on RA  Musculoskeletal:  MSK: Dressing c/d/i. Drain w/ serosanguinous output  Toes wwp. SILT L3-S1 bilaterally.   L2-3: Hip flexion L and R 5/5 strength    L4:  Knee extension L and R 5/5 strength   L5:  Foot / EHL dorsiflexion L and R 4/5 strength   S1:  Plantarflexion  L and R 5/5 strength    Assessment/Plan:   71 year old female POD 0 s/p L5-S1 PSF and OLIF w/ Dr. Ramirez.  Doing well.    - Continue plan per brief op note.    Francesco Martinez MD  Orthopaedic Surgery, PGY-4  Pager: [phone]

## 2021-11-19 NOTE — PLAN OF CARE
"BP (!) 143/77 (BP Location: Right arm)   Pulse 82   Temp 100  F (37.8  C) (Oral)   Resp 18   Ht 1.702 m (5' 7\")   Wt 74 kg (163 lb 2.3 oz)   SpO2 99%   BMI 25.55 kg/m  .     Neuro/CMS (Circulation, motor, sensory): Pt alert and verbal. Oriented x 4. Calm and cooperative. Baseline numbness BLE. No noted edema.     Respiratory: Lung sounds clear bilaterally in upper and lower lobes. Denies SOB. Denies chest pain. 3L O2 NC overnight. Currently on 2L NC, O2 sat 94%. Incentive spirometer at bedside, pt completes independently. No cough noted.    Gastrointestinal: Last BM 11/17. Bowel sounds normoactive all four quadrants. Denies passing gas.    Genitourinary: Riddle catheter in place, 1775 output for AM shift. No odor, no hematuria. Urine is clear, light yellow. Orders to remove riddle today but pt requested riddle stay in until she can walk to the bathroom. MD approved riddle to stay in at this time.    Activity: OOB with PT. Changes position independently in bed. Pt states rapid changes result in dizziness/nausea.    Skin/Dressings: Lower left ABD Aquacel dressing removed this AM. Wound edges well approximated - surgical glue intact. No increased redness or drainage.     Lines/Drains/Airways: R hand PIV infusing antibiotics and NS. L foot PIV infusing dextrose 5% and 0.45% NaCl + KCl 20 mEq/L. Riddle catheter in place. Bulb drain secure to upper L back, minimal output.    Pain: Pain rated 7/10 during AM med pass. Primarily in lower back - controlled with PRN oxycodone and robaxin.    Diet: Regular diet. C/o intermittent nausea and did not order breakfast.    Plan: Continue POC, expected discharge on 11/20/21. Monitor incision drainage, O2 saturation, pain.  "

## 2021-11-19 NOTE — ANESTHESIA POSTPROCEDURE EVALUATION
Patient: Nadira Perez    Procedure: Procedure(s):  Part 1: Oblique Anterior Interbody Fusion at Lumbar 5 to sacral 1 with use of Bone Morphogenic Protein,  Open Posterior Instrumented Spinal Fusion at Lumbar 5 to Sacral 1, with grimm aguayo osteotomy, use of O-Arm/Stealth       Diagnosis:Degenerative scoliosis in adult patient [M41.80]  Spondylolisthesis of lumbar region [M43.16]  Spinal stenosis of lumbar region with neurogenic claudication [M48.062]  Diagnosis Additional Information: No value filed.    Anesthesia Type:  General    Note:  Disposition: Inpatient   Postop Pain Control: Uneventful            Sign Out: Well controlled pain   PONV: No   Neuro/Psych: Uneventful            Sign Out: Acceptable/Baseline neuro status   Airway/Respiratory: Uneventful            Sign Out: Acceptable/Baseline resp. status   CV/Hemodynamics: Uneventful            Sign Out: Acceptable CV status; No obvious hypovolemia; No obvious fluid overload   Other NRE: NONE   DID A NON-ROUTINE EVENT OCCUR? No           Last vitals:  Vitals Value Taken Time   /104 11/18/21 2000   Temp 37.1  C (98.8  F) 11/18/21 2030   Pulse 83 11/18/21 2030   Resp 11 11/18/21 2030   SpO2 98 % 11/18/21 2030   Vitals shown include unvalidated device data.    Electronically Signed By: Audelia Groves MD  November 18, 2021  8:31 PM

## 2021-11-19 NOTE — PROGRESS NOTES
"CLINICAL NUTRITION SERVICES - ASSESSMENT NOTE     Nutrition Prescription    RECOMMENDATIONS FOR MDs/PROVIDERS TO ORDER:  Consider scheduled antiemetics before meals if nausea continues     Malnutrition Status:    Patient does not meet two of the established criteria necessary for diagnosing malnutrition    Recommendations already ordered by Registered Dietitian (RD):  Nutrition Education   Chocolate ensure max q meal      Future/Additional Recommendations:  Monitor intakes, wt trends and tolerance to supplements-adjust PRN     REASON FOR ASSESSMENT  Nadira Perez is a 71 year old female assessed by the dietitian for Provider Order - Dietitian to Assess and Order per Nutrition Protocol.  PMH significant for arthritis, breast CA, HLD, HTN, lymphedema, squamous cell carcinoma admitted for L5-S1 PSF and OLIF  NUTRITION HISTORY  Pt reports good PO at home. Stated she has purposefully lost 10# over the past 2 months or so.    CURRENT NUTRITION ORDERS  Diet: Regular  Intake/Tolerance: Pt has not had much to eat since admit. Stated she had some amparo crackers last night but declined breakfast this morning (stated she was too nauseous to eat). She was going to try and order lunch, was agreeable to trying ensure max     LABS  Labs reviewed:   Ref. Range 11/18/2021 18:59 11/18/2021 22:07 11/19/2021 05:14   Potassium Latest Ref Range: 3.4 - 5.3 mmol/L 2.6 (LL) 3.2 (L) 3.6     MEDICATIONS  Medications reviewed:  pepcid  thera-vit-m  miralax  Potassium chloride  Scopolamine patch  Senokot  Vitamin C  D5 0.45 NaCl + 20 mEq KCl/L  PRN: dulcolax, zofran, compazine    ANTHROPOMETRICS  Height: 170.2 cm (5' 7\")  Most Recent Weight: 74 kg (163 lb 2.3 oz)    IBW: 61.4 kg  BMI: 25.55 kg/m^2, Overweight BMI 25-29.9  Weight History: difficult to assess d/t stated/carried over weights. Stable x1 and 6 months   Wt Readings from Last 10 Encounters:   11/18/21 74 kg (163 lb 2.3 oz)   11/11/21 75.3 kg (166 lb)   11/08/21 75.3 kg (166 lb) "   11/04/21 78.6 kg (173 lb 4.8 oz)   10/29/21 75.8 kg (167 lb)   10/29/21 75.8 kg (167 lb)   10/28/21 75.8 kg (167 lb)   10/23/21 75.8 kg (167 lb)   10/22/21 75.8 kg (167 lb)   10/15/21 75.8 kg (167 lb)   05/10/21 73.9 kg (163 lb)     Dosing Weight: 64.6 kg (adjusted using current wt of 74 kg and ideal wt of 61.4 kg)    ASSESSED NUTRITION NEEDS  Estimated Energy Needs: 8612-4983 kcals/day (25 - 30 kcals/kg)  Justification: Maintenance  Estimated Protein Needs: 78-97 grams protein/day (1.2 - 1.5 grams of pro/kg)  Justification: Post-op  Estimated Fluid Needs: 1 mL/kcal  Justification: Maintenance or Per provider pending fluid status    PHYSICAL FINDINGS  See malnutrition section below.    MALNUTRITION  % Intake: Decreased intake does not meet criteria  % Weight Loss: None noted  Subcutaneous Fat Loss: None observed  Muscle Loss: None observed  Fluid Accumulation/Edema: None noted  Malnutrition Diagnosis: Patient does not meet two of the established criteria necessary for diagnosing malnutrition    NUTRITION DIAGNOSIS  Predicted inadequate protein-energy intake related to variable appetite and post op nausea as evidenced by pt reliant on PO intakes to meet 100% of nutritional needs with potential for variation        INTERVENTIONS  Implementation  Nutrition Education: Discussed role of RD, menu ordering and available snacks/supplements. Encouraged adequate protein for post op healing. Encouraged pt to request PRN antiemetics before meals as needed.   Chocolate ensure max q meal      Goals  Patient to consume % of nutritionally adequate meal trays TID, or the equivalent with supplements/snacks.     Monitoring/Evaluation  Progress toward goals will be monitored and evaluated per protocol.    Ruma Morrow MS, RD, LDN  Unit Pager [phone]

## 2021-11-19 NOTE — PLAN OF CARE
"    VS: /78 (BP Location: Right arm)   Pulse 80   Temp 98.8  F (37.1  C) (Oral)   Resp 12   Ht 1.702 m (5' 7\")   Wt 74 kg (163 lb 2.3 oz)   SpO2 93%   BMI 25.55 kg/m     3L NC, capno   Output: Fong output 750 mL   LBM 11/17   Activity: Not OOB  able to reposition in bed independently   Skin: Intact x incision   Pain: Managed w/ scheduled tylenol, PRN oxy, & robaxin   Neuro/CMS: A&Ox4, forgetful, CMS intact, baseline numbness in BLE   Dressing(s): CDI   Diet: Regular  Sips of water & amparo crackers   LDA: R PIV infusing ketamine  L foot PIV infusing fluids & intermittent antibiotics  RADHA output 3 mL   Equipment: IV pole   Plan: Continue POC   Additional Info: Hx of left sided breast cancer/lymphedema. All blood draws/pressures and IV's on R side       "

## 2021-11-19 NOTE — PROGRESS NOTES
"Orthopedic Surgery Progress Note    Assessment and Plan:   71 year old female with PMH including HTN, HLD, prediabetes, hypothyroid now s/p L5-S1 PSF and OLIF with Dr. Ramirez on 11/18/2021.     Goal for 11/19: riddle out, wean ketamine drip, work w/ PT, begin dispo planning     Ortho Primary, Medicine Comanaging, appreciate cares  Activity:   - Up with assist until independent. No excessive bending or twisting. No lifting >10 lbs x 6 weeks.   - If Barbara lift required for mobilization, please use high-back sling rather than universal sling. This is to minimize spine flexion.  Weight bearing status: WBAT.  Pain management:   - IV ketamine  - Transition to PO narcotics as tolerated. No NSAIDs x 3 months.   Antibiotics: Ancef until drain is out  Diet: Begin with clear fluids and progress diet as tolerated.   DVT prophylaxis: SCDs only. No chemical DVT ppx needed.  Imaging: XR Upright PTDC - ordered.  Labs: Hgb POD#1.  Bracing/Splinting: None.  Dressings: 4x4/tegaderm.  Drains: Document output per shift, will be discontinued at Orthopedic Surgery discretion.  Riddle catheter: Remove POD#1.  Physical Therapy/Occupational Therapy: Eval and treat.  Cultures: none.    Consults: Hospitalist, PT/OT, SW.  Follow-up: Clinic with Dr. Ramirez in 6 weeks with repeat x-rays.   Disposition: Pending progress with therapies, pain control on orals, and medical stability, anticipate discharge to home v TCU on POD #3-6.        Francesco Martinez MD  Orthopaedic Surgery, PGY-4  Pager: [phone]       =========================================================    Subjective:   NAEO. Pain controlled on ketamine drip. Riddle in place. Denies HA, CP, SOB, numbness or tingling, motor dysfunction or weakness. Not yet OOB.    Exam:  BP (!) 149/99 (BP Location: Right arm)   Pulse 84   Temp 99.5  F (37.5  C) (Oral)   Resp 20   Ht 1.702 m (5' 7\")   Wt 74 kg (163 lb 2.3 oz)   SpO2 97%   BMI 25.55 kg/m    Gen: Awake, alert, NAD  Resp: non-labored " breathing  CV: Extr wwp  MSK: Dressing c/d/i. Drain w/ serosanguinous output  Toes wwp. SILT L3-S1 bilaterally.              L2-3: Hip flexion L and R 5/5 strength               L4:  Knee extension L and R 5/5 strength              L5:  Foot / EHL dorsiflexion L and R 4/5 strength              S1:  Plantarflexion  L and R 5/5 strength    Drain: 34ml since surgery    Labs:  Recent Labs   Lab Test 11/18/21  0904 11/05/21  1324 05/07/21  1234 11/09/20  1544 05/12/20  1118 10/27/16  1121 09/15/16  1411   HGB  --  11.5* 11.5*  --  12.3   < >  --    CRP  --   --   --   --   --   --  <2.9   WBC  --  8.0 7.9  --  7.8   < >  --    PLT  --  255 291  --  255   < >  --    CR  --  0.70 0.74  0.73 0.72 0.59   < > 0.55   * 99 98  99  --  112*   < > 95    < > = values in this interval not displayed.       Imaging:  Lumbar films when able

## 2021-11-19 NOTE — PLAN OF CARE
"      VS: BP (!) 148/95   Pulse 85   Temp 98.8  F (37.1  C) (Oral)   Resp 14   Ht 1.702 m (5' 7\")   Wt 74 kg (163 lb 2.3 oz)   SpO2 96%   BMI 25.55 kg/m    3L NC >95%   Output: Voiding spontaneously without difficulty, LBM 11/17 denies passing gas after surgery thus far   Lungs: CTA   Activity: Able to readjust in bed IND   Skin: Intact ex incision   Pain:   Tolerable with some discomfort- pt would like PRN robaxin and oxycodone when available   Neuro/CMS:   A&Ox4, baseline numbness in BLE feet   Dressing(s):   CDI   Diet:   Regular- tolerating water/ice thus far   LDA:   R PIV infusing ketamine. L foot infusing fluids   Equipment:   IV pole, CAPNO   Plan:   Continue to monitor and follow plan of care. Able to make needs known and call light within reach   Additional Info:   Hx of left sided breast cancer/lymphedema. All blood draws/pressures and IV's on R side       "

## 2021-11-19 NOTE — PROGRESS NOTES
[facility]    Medicine Progress Note - Hospitalist Service       Date of Admission:  11/18/2021    Assessment & Plan           Nadira Perez is a 71 year old female admitted on 11/18/2021 s/p POD 0 s/p L5-S1 PSF and OLIF w/ Dr. Ramirez.  Doing well. EBL:  200mL with 0mL returned via cellsaver. No complications.      Currently doing well.     Comorbid conditions of hypertension, hyperlipidemia, neuropathy, prediabetes, hypothyroidism, history of nephrolithiasis, history of breast cancer, dermatitis, history of squamous cell carcinoma, degenerative disc disease, scoliosis, carpal tunnel syndrome, knee pain and osteoporosis:      # Orthopedic Surgery:      Post Op Management per Primary team.   Hemodynamics: stable currently.   Discontinue IV fluid once tolerating oral well  Pain control- per Primary team , better controlled   Consider bowel regimen with narcotics.   Encourage Incentive spirometry to prevent atelectasis.  DVT prophylaxis- per Primary team.  Activity, antibiotics, wound and/or drain care - per Primary team.      Cardiovascular:  Currently asymptomatic.  Vitals: Stable.  History of PVCs.  Preop EKG: Normal sinus with LVH.     Hypertension: PTA amlodipine, metoprolol, lisinopril: Continue with hold parameters.  Hold PTA diuretics.  Pain control  Monitor blood pressure     Hyperlipidemia:  Continue PTA Lipitor     Hematology:   Anemia: Follow hemoglobin for anemia of acute blood loss.  Transfuse to keep above 7.  Preop hemoglobin 11.5.  Hemoglobin today 9.4.     Carpal tunnel, neuropathy: PTA on gabapentin.  Continue.     Hypothyroidism: Continue PTA levothyroxine.     Oncology: History of breast cancer s/p bilateral mastectomy, radiation and chemotherapy.   Doing well.     Hypokalemia: Acute on chronic.  Potassium 2.6.  11/18.   Continue PTA potassium supplement.  RN managed potassium, standard protocol  Better.     Skin: Dermatitis - continue topical  cream.   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 She has some dysphagia issues.   ~ Bilateral hearing loss with hearing aids.        Rest per primary.      Thank you for the consultation. Please page with any question. Hospitalist team to follow.       The patient's care was discussed with the Bedside Nurse, Care Coordinator/, Patient and Primary team.    Poncho Ovalles MD  Hospitalist Service  [facility]  Securely message with the Vocera Web Console (learn more here)  Text page via [facility] Paging/Directory          ______________________________________________________________________    Interval History   Interval events reviewed.   States doing well  Denies fever or chills.   No cough or cp or sob.   No LH or dizziness.   No NV or pain abdomen.   Fong with clear urine  No new sensory or motor complaint.   No new rash.    No other new or acute medical concern      Data reviewed today: I reviewed all medications, new labs and imaging results over the last 24 hours. I personally reviewed no images or EKG's today.    Physical Exam   Vital Signs: Temp: 100  F (37.8  C) Temp src: Oral BP: (!) 143/77 Pulse: 82   Resp: 18 SpO2: 99 % O2 Device: Nasal cannula Oxygen Delivery: 3 LPM  Weight: 163 lbs 2.25 oz    General Appearance: Awake, interactive, NAD  Respiratory: Normal work of breathing. CTA BL.   Cardiovascular: S1 S2 Regular  GI: Soft. NT. ND.  Extremities: Distally wwp. No pedal edema  Skin: No acute rash on exposed areas.   Other: A0 x 4.  Grossly nonfocal.    Data   Recent Labs   Lab 11/19/21  0514 11/18/21  2207 11/18/21  1859 11/18/21  0904   WBC 9.9  --   --   --    HGB 9.4* 10.8*  --   --    MCV 88  --   --   --      --   --   --     138  --   --    POTASSIUM 3.6 3.2* 2.6*  --    CHLORIDE 108  105  --   --    CO2 27 26  --   --    BUN 14 15  --   --    CR 0.78 0.69  --   --    ANIONGAP 5 7  --   --    RAMBO 8.2* 8.8  --   --    * 150*  --  122*     No results found for this or any previous visit (from the past 24 hour(s)).  Medications       acetaminophen  975 mg Oral Q8H     amLODIPine  10 mg Oral Daily     atorvastatin  80 mg Oral QPM     famotidine  20 mg Oral BID    Or     famotidine  20 mg Intravenous BID     gabapentin  300 mg Oral TID     levothyroxine  56 mcg Oral Daily     lisinopril  20 mg Oral BID     metoprolol succinate ER  50 mg Oral QAM     multivitamin w/minerals  1 tablet Oral Daily with lunch     polyethylene glycol  17 g Oral BID     potassium chloride ER  20 mEq Oral TID     scopolamine  1 patch Transdermal Q72H    And     scopolamine   Transdermal Q8H     senna-docusate  2 tablet Oral BID     sodium chloride (PF)  3 mL Intracatheter Q8H     vitamin C  500 mg Oral Daily

## 2021-11-19 NOTE — DISCHARGE SUMMARY
Orthopaedic Surgery Discharge Summary    Name: Nadira Perez  MRN: [id number]  YOB: 1950    Date of Admission: 11/18/2021  Date of Discharge: 11/22/21  Attending Physician: Dr. Ismael Ramirez    Admission Diagnosis:  Degenerative scoliosis in adult patient [M41.80]  Spondylolisthesis of lumbar region [M43.16]  Spinal stenosis of lumbar region with neurogenic claudication [M48.062]    Discharge Diagnosis:  same    Procedures Performed:  L5-S1 OLIF and PSF    Brief History of Present Illness:  71 year old female with PMH including HTN, HLD, prediabetes, hypothyroid now s/p L5-S1 PSF and OLIF with Dr. Ramirez on 11/18/2021.     Brief Hospital Course:  The patient was admitted to the hospital and underwent the above listed procedure.  Hospital course was uneventful.  Patient was seen by physical therapy in hospital, received postoperative antibiotics, and received CDs for DVT prophylaxis.    On POD#4, patient was tolerating a regular diet, voiding on own, had pain controlled on oral pain medications and was felt to be medically stable for d/c to home.    Exam at time of Discharge: see progress note from date of discharge    DVT prophylaxis: none    Consults:  IM, PT, OT    Discharge Instructions and Follow-up  Discharge Procedure Orders   Physical Therapy Referral   Referral Priority: Routine Referral Type: Rehab Therapy Physical Therapy   Number of Visits Requested: 1     Reason for your hospital stay   Order Comments: s/p L5-S1 PSF and OLIF with Dr. Ramirez on 11/18/2021.     Activity   Order Comments: Your activity upon discharge: activity as tolerated     Activity:   - Up with assist until independent. No excessive bending or twisting. No lifting >10 lbs x 6 weeks.   - If Barbara lift required for mobilization, please use high-back sling rather than universal sling. This is to minimize spine flexion.  Weight bearing status: WBAT.     Order Specific Question Answer Comments   Is discharge order? Yes       Adult [facility] Follow-up and recommended labs and tests   Order Comments: Follow-up: Clinic with Dr. Ramirez in 6 weeks with repeat x-rays    Clinic phone number is     Appointments on [place] and/or [facility] (with [facility] or [facility] provider or service). Call [phone] if you haven't heard regarding these appointments within 7 days of discharge.     When to contact your care team   Order Comments: Call Dr Ramirez if you have any of the following: temperature greater than 101. 3  or less than 96.5,  increased shortness of breath, increased drainage, increased swelling, or increased pain.     Wound care and dressings   Order Comments: Instructions to care for your wound at home: ice to area for comfort, keep wound clean and dry, may get incision wet in shower but do not soak or scrub, reinforce dressing as needed, and remove dressing in 7 days.     Miscellaneous DME Order   Order Comments: DME Documentation:   Describe the reason for need to support medical necessity: s/p spine surgery      I, the undersigned, certify that the above prescribed supplies are medically necessary for this patient and is both reasonable and necessary in reference to accepted standards of medical and necessary in reference to accepted standards of medical practice in the treatment of this patient's condition and is not prescribed as a convenience.     Order Specific Question Answer Comments   DME Provider: [facility]    DME Item Needed: Raised toilet seat (for bathroom), bedside commode (for bedroom due to frequency), shower chair, toilet tongs    Length of Need: 3 months      Diet   Order Comments: Follow this diet upon discharge: Orders Placed This Encounter      Snacks/Supplements Adult: Ensure Max Protein (bariatric); With Meals      Advance Diet as Tolerated: Regular Diet Adult     Order Specific Question Answer Comments   Is discharge order? Yes        Discharge Disposition: home    Francesco Martinez  MD  Orthopaedic Surgery, PGY-4  Pager: [phone]

## 2021-11-19 NOTE — PHARMACY-ADMISSION MEDICATION HISTORY
Please see Admission Medication History completed on 11/3/21 under previous encounter at [facility] by Denny Lebron PharmD for information regarding prior to admission medications.    Last doses charted by OR staff. Changes on this med list below compared to Denny's on 11/3 include starting spironolactone and stopping the anastrozole. See provider and [facility] notes if needed regarding those changes. Unsure if patient started the spironolactone yet as it was a new med ordered the day prior to admission. I tried calling the patient to see if she knew of any other changes, but her phone was busy. Fill history matches up with current medication list below too.    Prior to Admission medications    Medication Sig Last Dose Taking? Auth Provider   Acetaminophen (APAP 500 PO) Take 1,000 mg by mouth 3 times daily  11/17/2021 at 0900 Yes Reported, Patient   amLODIPine (NORVASC) 10 MG tablet Take 1 tablet (10 mg) by mouth daily  Patient taking differently: Take 10 mg by mouth daily Takes around 10 AM. 11/18/2021 at 0500 Yes Matilde Quiñonez APRN CNP   Ascorbic Acid (VITAMIN C) 500 MG CHEW Take 1 tablet by mouth daily 11/17/2021 at 0800 Yes Reported, Patient   atorvastatin (LIPITOR) 80 MG tablet Take 1 tablet (80 mg) by mouth daily  Patient taking differently: Take 80 mg by mouth every evening  11/17/2021 at 2100 Yes Matilde Quiñonez APRN CNP   Cholecalciferol (VITAMIN D3) 50 MCG (2000 UT) CAPS Take 6,000 Units by mouth daily  Patient taking differently: Take 6,000 Units by mouth daily (with lunch)  11/17/2021 at 0800 Yes Matilde Quiñonez APRN CNP   CRANBERRY EXTRACT PO Take 650 mg by mouth daily ~10 am 11/17/2021 at 0800 Yes Reported, Patient   cyclobenzaprine (FLEXERIL) 10 MG tablet Take 1 tablet (10 mg) by mouth 3 times daily as needed for muscle spasms Past Month at Unknown time Yes Matilde Quiñonez APRN CNP   diclofenac (VOLTAREN) 1 % topical gel Apply 4 grams to knees or 2 grams to hands four times daily  using enclosed dosing card.  Patient taking differently: Apply 4 grams to knees or 2 grams to hands four times daily as needed using enclosed dosing card. More than a month at Unknown time Yes Matilde Quiñonez APRN CNP   gabapentin (NEURONTIN) 300 MG capsule Take 1-2 capsules by mouth every 8 hours  Patient taking differently: Take 600 mg by mouth 3 times daily  11/17/2021 at 2100 Yes Matilde Quiñonez APRN CNP   HYDROcodone-acetaminophen (NORCO) 5-325 MG tablet Take 1 tablet by mouth every 6 hours as needed for severe pain 11/18/2021 at 0500 Yes Matilde Quiñonez APRN CNP   ibuprofen (ADVIL/MOTRIN) 200 MG tablet Take 600 mg by mouth 3 times daily  Past Week at Unknown time Yes Reported, Patient   levothyroxine (SYNTHROID/LEVOTHROID) 112 MCG tablet TAKE 1/2 TABLET BY MOUTH DAILY 11/18/2021 at 0800 Yes Matilde Quiñonez APRN CNP   lisinopril (ZESTRIL) 40 MG tablet Take 1 tablet (40 mg) by mouth daily  Patient taking differently: Take 20 mg by mouth 2 times daily Takes 1/2 tablet at 2 PM and the other 1/2 tablet around 10 PM 11/17/2021 at 2100 Yes Matilde Quiñonez APRN CNP   melatonin 5 MG tablet Take 10 mg by mouth At Bedtime  11/17/2021 at 2100 Yes Reported, Patient   metoprolol succinate ER (TOPROL-XL) 50 MG 24 hr tablet Take 1 tablet (50 mg) by mouth daily  Patient taking differently: Take 50 mg by mouth every morning  11/18/2021 at 0500 Yes Matilde Quiñonez APRN CNP   Multiple Vitamin (MULTI-VITAMIN) per tablet Take 1 tablet by mouth daily (with lunch)  11/17/2021 at 0800 Yes Reported, Patient   ondansetron (ZOFRAN-ODT) 4 MG ODT tab Take 1 tablet (4 mg) by mouth every 12 hours as needed for nausea More than a month at Unknown time Yes Matilde Quiñonez APRN CNP   potassium chloride ER (KLOR-CON M) 20 MEQ CR tablet Take 20 meq three times daily.  Patient taking differently: Take 20 mEq by mouth 3 times daily Take 20 meq three times daily. 11/17/2021 at 0800 Yes Matilde Quiñonez APRN CNP    spironolactone (ALDACTONE) 25 MG tablet Take 1 tablet (25 mg) by mouth daily Unknown at Unknown time Yes Matilde Quiñonez APRN CNP   terbinafine (LAMISIL AT) 1 % external cream Apply topically 2 times daily For fungal infection not resolved with other antifungals (e.g. Clotrimazole)  Patient taking differently: Apply topically 2 times daily as needed (toenail fungus) For fungal infection not resolved with other antifungals (e.g. Clotrimazole) More than a month at Unknown time Yes Matilde Quiñonez APRN CNP   tiZANidine (ZANAFLEX) 2 MG tablet Take 1-2 tablets (2-4 mg) by mouth 3 times daily as needed for muscle spasms  Patient taking differently: Take 2-4 mg by mouth 3 times daily as needed for muscle spasms Uses this or flexeril - prefers tizanidine. 11/17/2021 at 2100 Yes Matilde Quiñonez APRN CNP   triamcinolone (KENALOG) 0.1 % external ointment Apply topically 2 times daily  Patient taking differently: Apply topically 2 times daily as needed  More than a month at Unknown time Yes Matilde Quiñonez APRN CNP   triamterene-HCTZ (MAXZIDE-25) 37.5-25 MG tablet Take 1 tablet by mouth daily 11/17/2021 at 0800 Yes Matilde Quiñonez APRN CNP   Vaginal Lubricant (REPHRESH) GEL Place 2 g vaginally every 3 days 11/17/2021 at 0800 Yes Matilde Quiñonez APRN CNP       Date completed: 11/19/21    Medication history completed by: Shawanda Newman [facility]

## 2021-11-19 NOTE — PROGRESS NOTES
11/19/21 1434   Quick Adds   Type of Visit Initial Occupational Therapy Evaluation   Living Environment   People in home alone   Current Living Arrangements house   Home Accessibility stairs to enter home   Number of Stairs, Main Entrance 2   Stair Railings, Main Entrance none   Transportation Anticipated family or friend will provide   Living Environment Comments plan to d/c to brothers home, walk in shower   Self-Care   Usual Activity Tolerance good   Current Activity Tolerance moderate   Regular Exercise Yes   Equipment Currently Used at Home cane, straight;walker, rolling   Activity/Exercise/Self-Care Comment IND at baseline   Instrumental Activities of Daily Living (IADL)   IADL Comments has , IND at baseline   Disability/Function   Hearing Difficulty or Deaf yes   Patient's preferred means of communication verbal   Describe hearing loss bilateral hearing loss   Use of hearing assistive devices bilateral hearing aids   Wear Glasses or Blind yes   Vision Management reading glasses   Difficulty Communicating no   Walking or Climbing Stairs Difficulty yes   Walking or Climbing Stairs ambulation difficulty, requires equipment   Mobility Management cane & walker   Dressing/Bathing Difficulty no   Toileting issues no   Doing Errands Independently Difficulty (such as shopping) yes   Errands Management family & friends   Fall history within last six months yes   Number of times patient has fallen within last six months 6  (possibly more)   Change in Functional Status Since Onset of Current Illness/Injury yes   General Information   Onset of Illness/Injury or Date of Surgery 11/18/21   Referring Physician Francesco Martinez MD   Patient/Family Therapy Goal Statement (OT) not endorsed   Additional Occupational Profile Info/Pertinent History of Current Problem 71 year old female with PMH including HTN, HLD, prediabetes, hypothyroid now s/p L5-S1 PSF and OLIF with Dr. Ramirez on 11/18/2021.   Existing  Precautions/Restrictions fall;spinal   Left Lower Extremity (Weight-bearing Status) weight-bearing as tolerated (WBAT)   Right Lower Extremity (Weight-bearing Status) weight-bearing as tolerated (WBAT)   Cognitive Status Examination   Orientation Status orientation to person, place and time   Affect/Mental Status (Cognitive) WFL   Follows Commands WFL   Sensory   Sensory Comments baseline neuropathy   Pain Assessment   Patient Currently in Pain Yes, see Vital Sign flowsheet   Range of Motion Comprehensive   General Range of Motion bilateral upper extremity ROM WFL   Comment, General Range of Motion not formally assessed, limited by pain   Strength Comprehensive (MMT)   Comment, General Manual Muscle Testing (MMT) Assessment not formally assessed, limited by precautions/pain   Coordination   Upper Extremity Coordination No deficits were identified   Bed Mobility   Bed Mobility supine-sit   Supine-Sit [place] (Bed Mobility) supervision;verbal cues   Assistive Device (Bed Mobility) bed rails   Transfers   Transfers sit-stand transfer;toilet transfer   Sit-Stand Transfer   Sit-Stand [place] (Transfers) verbal cues;contact guard   Assistive Device (Sit-Stand Transfers) walker, front-wheeled   Toilet Transfer   Type (Toilet Transfer) sit-stand;stand pivot/stand step   [place] Level (Toilet Transfer) verbal cues;contact guard   Assistive Device (Toilet Transfer) commode chair;walker, front-wheeled   Activities of Daily Living   BADL Assessment lower body dressing;toileting   Lower Body Dressing Assessment   [place] Level (Lower Body Dressing) unable to assess   Toileting   [place] Level (Toileting) unable to assess   Comment (Toileting) pt reports IND since admission   Clinical Impression   Criteria for Skilled Therapeutic Interventions Met (OT) yes;meets criteria;skilled treatment is necessary   OT Diagnosis decreased independence with ADL's   OT Problem List-Impairments impacting ADL activity  tolerance impaired;mobility;strength;pain;post-surgical precautions   Assessment of Occupational Performance 3-5 Performance Deficits   Identified Performance Deficits toileting, dressing, bathing, home mgmt, functional mobility   Planned Therapy Interventions (OT) ADL retraining;bed mobility training;strengthening;transfer training   Clinical Decision Making Complexity (OT) low complexity   Therapy Frequency (OT) Daily   Predicted Duration of Therapy 3 days   Risk & Benefits of therapy have been explained care plan/treatment goals reviewed;patient   OT Discharge Planning    OT Discharge Recommendation (DC Rec) Home with assist   OT Rationale for DC Rec Anticipate pt will progress towards baseline with continued skilled therapy, has family support at home to assist with ADL/IADL's. Recommended DME to facilitate independence.     OT Brief overview of current status  SBA bed mobility, CGA mobility, transfers with FWW. Cues to maintain spinal precautions   Total Evaluation Time (Minutes)   Total Evaluation Time (Minutes) 8

## 2021-11-19 NOTE — TELEPHONE ENCOUNTER
----- Message from Francesco Martinez MD sent at 11/18/2021  5:14 PM CST -----  Dear Ortho team,    Please have attached patient follow-up w/ spine PA in 3 weeks for wound check and suture removal. Patient is status post L5-S1 PSF and OLIF on 11/18 w/ Dr. Ramirez. No XR needed. Thank you very much for your help.    Sincerely,  Francesco Martinez MD  Orthopaedic Surgery, PGY-4  Pager: [phone]

## 2021-11-20 ENCOUNTER — APPOINTMENT (OUTPATIENT)
Dept: OCCUPATIONAL THERAPY | Facility: CLINIC | Age: 71
DRG: 454 | End: 2021-11-20
Attending: ORTHOPAEDIC SURGERY
Payer: COMMERCIAL

## 2021-11-20 ENCOUNTER — APPOINTMENT (OUTPATIENT)
Dept: PHYSICAL THERAPY | Facility: CLINIC | Age: 71
DRG: 454 | End: 2021-11-20
Attending: ORTHOPAEDIC SURGERY
Payer: COMMERCIAL

## 2021-11-20 LAB
GLUCOSE BLDC GLUCOMTR-MCNC: 102 MG/DL (ref 70–99)
GLUCOSE BLDC GLUCOMTR-MCNC: 128 MG/DL (ref 70–99)
GLUCOSE BLDC GLUCOMTR-MCNC: 149 MG/DL (ref 70–99)
GLUCOSE BLDC GLUCOMTR-MCNC: 191 MG/DL (ref 70–99)

## 2021-11-20 PROCEDURE — 99232 SBSQ HOSP IP/OBS MODERATE 35: CPT | Performed by: INTERNAL MEDICINE

## 2021-11-20 PROCEDURE — 97110 THERAPEUTIC EXERCISES: CPT | Mod: GP

## 2021-11-20 PROCEDURE — 250N000011 HC RX IP 250 OP 636: Performed by: STUDENT IN AN ORGANIZED HEALTH CARE EDUCATION/TRAINING PROGRAM

## 2021-11-20 PROCEDURE — 97530 THERAPEUTIC ACTIVITIES: CPT | Mod: GO | Performed by: OCCUPATIONAL THERAPIST

## 2021-11-20 PROCEDURE — 97530 THERAPEUTIC ACTIVITIES: CPT | Mod: GP

## 2021-11-20 PROCEDURE — 250N000013 HC RX MED GY IP 250 OP 250 PS 637: Performed by: CLINICAL NURSE SPECIALIST

## 2021-11-20 PROCEDURE — 120N000002 HC R&B MED SURG/OB UMMC

## 2021-11-20 PROCEDURE — 250N000009 HC RX 250: Performed by: STUDENT IN AN ORGANIZED HEALTH CARE EDUCATION/TRAINING PROGRAM

## 2021-11-20 PROCEDURE — 250N000013 HC RX MED GY IP 250 OP 250 PS 637: Performed by: STUDENT IN AN ORGANIZED HEALTH CARE EDUCATION/TRAINING PROGRAM

## 2021-11-20 PROCEDURE — 250N000013 HC RX MED GY IP 250 OP 250 PS 637: Performed by: INTERNAL MEDICINE

## 2021-11-20 PROCEDURE — 97116 GAIT TRAINING THERAPY: CPT | Mod: GP

## 2021-11-20 PROCEDURE — 97535 SELF CARE MNGMENT TRAINING: CPT | Mod: GO | Performed by: OCCUPATIONAL THERAPIST

## 2021-11-20 PROCEDURE — 250N000013 HC RX MED GY IP 250 OP 250 PS 637: Performed by: PHYSICIAN ASSISTANT

## 2021-11-20 RX ORDER — MAGNESIUM CARB/ALUMINUM HYDROX 105-160MG
296 TABLET,CHEWABLE ORAL
Status: COMPLETED | OUTPATIENT
Start: 2021-11-20 | End: 2021-11-21

## 2021-11-20 RX ORDER — OXYCODONE HYDROCHLORIDE 5 MG/1
5-10 TABLET ORAL
Qty: 45 TABLET | Refills: 0 | Status: SHIPPED | OUTPATIENT
Start: 2021-11-20 | End: 2021-12-17

## 2021-11-20 RX ORDER — MAGNESIUM CARB/ALUMINUM HYDROX 105-160MG
296 TABLET,CHEWABLE ORAL ONCE
Status: DISCONTINUED | OUTPATIENT
Start: 2021-11-20 | End: 2021-11-20

## 2021-11-20 RX ORDER — HYDROXYZINE HYDROCHLORIDE 10 MG/1
10 TABLET, FILM COATED ORAL EVERY 6 HOURS PRN
Qty: 40 TABLET | Refills: 0 | Status: SHIPPED | OUTPATIENT
Start: 2021-11-20 | End: 2021-12-07

## 2021-11-20 RX ORDER — POLYETHYLENE GLYCOL 3350 17 G/17G
17 POWDER, FOR SOLUTION ORAL 2 TIMES DAILY
Qty: 7 PACKET | Refills: 0 | Status: SHIPPED | OUTPATIENT
Start: 2021-11-20 | End: 2022-04-19

## 2021-11-20 RX ORDER — METHOCARBAMOL 500 MG/1
1000 TABLET, FILM COATED ORAL 4 TIMES DAILY PRN
Qty: 40 TABLET | Refills: 0 | Status: SHIPPED | OUTPATIENT
Start: 2021-11-20 | End: 2021-11-29

## 2021-11-20 RX ORDER — MAGNESIUM OXIDE 400 MG/1
400 TABLET ORAL ONCE
Status: COMPLETED | OUTPATIENT
Start: 2021-11-20 | End: 2021-11-20

## 2021-11-20 RX ORDER — ACETAMINOPHEN 325 MG/1
650 TABLET ORAL EVERY 4 HOURS PRN
Qty: 60 TABLET | Refills: 0 | Status: SHIPPED | OUTPATIENT
Start: 2021-11-21 | End: 2022-04-19

## 2021-11-20 RX ORDER — AMOXICILLIN 250 MG
2 CAPSULE ORAL 2 TIMES DAILY
Qty: 30 TABLET | Refills: 0 | Status: SHIPPED | OUTPATIENT
Start: 2021-11-20 | End: 2023-05-26

## 2021-11-20 RX ADMIN — POLYETHYLENE GLYCOL 3350 17 G: 17 POWDER, FOR SOLUTION ORAL at 19:39

## 2021-11-20 RX ADMIN — DOCUSATE SODIUM 50MG AND SENNOSIDES 8.6MG 2 TABLET: 8.6; 5 TABLET, FILM COATED ORAL at 08:20

## 2021-11-20 RX ADMIN — AMLODIPINE BESYLATE 10 MG: 10 TABLET ORAL at 08:21

## 2021-11-20 RX ADMIN — ONDANSETRON 4 MG: 4 TABLET, ORALLY DISINTEGRATING ORAL at 11:20

## 2021-11-20 RX ADMIN — Medication 1 MG: at 19:47

## 2021-11-20 RX ADMIN — POLYETHYLENE GLYCOL 3350 17 G: 17 POWDER, FOR SOLUTION ORAL at 08:21

## 2021-11-20 RX ADMIN — ACETAMINOPHEN 975 MG: 325 TABLET, FILM COATED ORAL at 19:33

## 2021-11-20 RX ADMIN — BISACODYL 10 MG: 10 SUPPOSITORY RECTAL at 08:30

## 2021-11-20 RX ADMIN — OXYCODONE HYDROCHLORIDE AND ACETAMINOPHEN 500 MG: 500 TABLET ORAL at 08:33

## 2021-11-20 RX ADMIN — OXYCODONE HYDROCHLORIDE 5 MG: 5 TABLET ORAL at 08:27

## 2021-11-20 RX ADMIN — LISINOPRIL 20 MG: 10 TABLET ORAL at 14:20

## 2021-11-20 RX ADMIN — SCOPALAMINE 1 PATCH: 1 PATCH, EXTENDED RELEASE TRANSDERMAL at 09:55

## 2021-11-20 RX ADMIN — OXYCODONE HYDROCHLORIDE 5 MG: 5 TABLET ORAL at 04:19

## 2021-11-20 RX ADMIN — LISINOPRIL 20 MG: 10 TABLET ORAL at 21:45

## 2021-11-20 RX ADMIN — METHOCARBAMOL 1000 MG: 500 TABLET ORAL at 06:24

## 2021-11-20 RX ADMIN — OXYCODONE HYDROCHLORIDE 10 MG: 5 TABLET ORAL at 19:47

## 2021-11-20 RX ADMIN — METHOCARBAMOL 1000 MG: 500 TABLET ORAL at 21:45

## 2021-11-20 RX ADMIN — POTASSIUM CHLORIDE 20 MEQ: 750 TABLET, EXTENDED RELEASE ORAL at 08:21

## 2021-11-20 RX ADMIN — OXYCODONE HYDROCHLORIDE 5 MG: 5 TABLET ORAL at 13:06

## 2021-11-20 RX ADMIN — ACETAMINOPHEN 975 MG: 325 TABLET, FILM COATED ORAL at 04:19

## 2021-11-20 RX ADMIN — SODIUM PHOSPHATE 1 ENEMA: 7; 19 ENEMA RECTAL at 13:09

## 2021-11-20 RX ADMIN — METHOCARBAMOL 1000 MG: 500 TABLET ORAL at 00:33

## 2021-11-20 RX ADMIN — GABAPENTIN 300 MG: 300 CAPSULE ORAL at 14:20

## 2021-11-20 RX ADMIN — MULTIPLE VITAMINS W/ MINERALS TAB 1 TABLET: TAB at 14:20

## 2021-11-20 RX ADMIN — POLYETHYLENE GLYCOL AND PROPYLENE GLYCOL 1 DROP: 4; 3 SOLUTION/ DROPS OPHTHALMIC at 11:20

## 2021-11-20 RX ADMIN — POTASSIUM CHLORIDE 20 MEQ: 750 TABLET, EXTENDED RELEASE ORAL at 14:20

## 2021-11-20 RX ADMIN — DOCUSATE SODIUM 50MG AND SENNOSIDES 8.6MG 2 TABLET: 8.6; 5 TABLET, FILM COATED ORAL at 19:32

## 2021-11-20 RX ADMIN — Medication 400 MG: at 19:33

## 2021-11-20 RX ADMIN — GABAPENTIN 300 MG: 300 CAPSULE ORAL at 08:20

## 2021-11-20 RX ADMIN — ATORVASTATIN CALCIUM 80 MG: 40 TABLET, FILM COATED ORAL at 19:32

## 2021-11-20 RX ADMIN — POTASSIUM CHLORIDE 20 MEQ: 750 TABLET, EXTENDED RELEASE ORAL at 19:33

## 2021-11-20 RX ADMIN — Medication 56 MCG: at 09:55

## 2021-11-20 RX ADMIN — GABAPENTIN 300 MG: 300 CAPSULE ORAL at 19:33

## 2021-11-20 RX ADMIN — FAMOTIDINE 20 MG: 20 TABLET ORAL at 08:21

## 2021-11-20 RX ADMIN — ACETAMINOPHEN 975 MG: 325 TABLET, FILM COATED ORAL at 13:03

## 2021-11-20 RX ADMIN — FAMOTIDINE 20 MG: 20 TABLET ORAL at 19:33

## 2021-11-20 RX ADMIN — METOPROLOL SUCCINATE 50 MG: 50 TABLET, EXTENDED RELEASE ORAL at 08:18

## 2021-11-20 ASSESSMENT — ACTIVITIES OF DAILY LIVING (ADL)
ADLS_ACUITY_SCORE: 14
ADLS_ACUITY_SCORE: 14
ADLS_ACUITY_SCORE: 18
ADLS_ACUITY_SCORE: 14
ADLS_ACUITY_SCORE: 16
ADLS_ACUITY_SCORE: 14
ADLS_ACUITY_SCORE: 14
ADLS_ACUITY_SCORE: 16
ADLS_ACUITY_SCORE: 16
ADLS_ACUITY_SCORE: 14
ADLS_ACUITY_SCORE: 16
ADLS_ACUITY_SCORE: 14
ADLS_ACUITY_SCORE: 14
ADLS_ACUITY_SCORE: 16
ADLS_ACUITY_SCORE: 16
ADLS_ACUITY_SCORE: 14

## 2021-11-20 NOTE — PROGRESS NOTES
[facility]    Medicine Progress Note - Hospitalist Service       Date of Admission:  11/18/2021    Assessment & Plan           Nadira Perez is a 71 year old female admitted on 11/18/2021 s/p POD 0 s/p L5-S1 PSF and OLIF w/ Dr. Ramirez.  Doing well. EBL:  200mL with 0mL returned via cellsaver. No complications.      Currently doing well.     Comorbid conditions of hypertension, hyperlipidemia, neuropathy, prediabetes, hypothyroidism, history of nephrolithiasis, history of breast cancer, dermatitis, history of squamous cell carcinoma, degenerative disc disease, scoliosis, carpal tunnel syndrome, knee pain and osteoporosis:     Today's changes: 11/20/2021  Doing well.   Constipation+ no other concern.  Changes: Added Fleet enema daily as needed for constipation.        # Orthopedic Surgery:      Post Op Management per Primary team.   Hemodynamics: stable currently.   Pain control- per Primary team , better controlled   Aggressive bowel regimen with narcotics.   Encourage Incentive spirometry to prevent atelectasis.  DVT prophylaxis- per Primary team.  Activity, antibiotics, wound and/or drain care - per Primary team.      Cardiovascular:  Currently asymptomatic.  Vitals: Stable.  History of PVCs.  Preop EKG: Normal sinus with LVH.     Hypertension: PTA amlodipine, metoprolol, lisinopril: Continue with hold parameters.  Hold PTA diuretics.  Pain control  Monitor blood pressure     Hyperlipidemia:  Continue PTA Lipitor     Hematology:   Anemia: Follow hemoglobin for anemia of acute blood loss.  Transfuse to keep above 7.  Preop hemoglobin 11.5.  Hemoglobin 11/19: 9.4.     Carpal tunnel, neuropathy: PTA on gabapentin.  Continue.     Hypothyroidism: Continue PTA levothyroxine.     Oncology: History of breast cancer s/p bilateral mastectomy, radiation and chemotherapy.   Doing well.     Hypokalemia: Acute on chronic.  Potassium 2.6.  11/18.   Continue PTA potassium  supplement.  RN managed potassium, standard protocol  Better.     Skin: Dermatitis - continue topical cream.   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 She has some dysphagia issues.   ~ Bilateral hearing loss with hearing aids.        Rest per primary.      Thank you for the consultation. Please page with any question. Hospitalist team to follow.       The patient's care was discussed with the Bedside Nurse and Patient.    Poncho Ovalles MD  Hospitalist Service  [facility]  Securely message with the Vocera Web Console (learn more here)  Text page via [facility] Paging/Directory          ______________________________________________________________________    Interval History   Interval events reviewed.   States doing well  Denies fever or chills.   No cough or cp or sob.   No LH or dizziness.   No NV or pain abdomen.  Flatus present.    No new sensory or motor complaint.   No new rash.    No other new or acute medical concern      Data reviewed today: I reviewed all medications, new labs and imaging results over the last 24 hours. I personally reviewed no images or EKG's today.    Physical Exam   Vital Signs: Temp: 100.2  F (37.9  C) Temp src: Oral BP: (!) 150/100 Pulse: 93   Resp: 16 SpO2: 98 % O2 Device: None (Room air)    Weight: 163 lbs 2.25 oz    General Appearance: Awake, interactive, NAD  Respiratory: Normal work of breathing.    Cardiovascular: S1 S2 Regular  GI: Soft. NT. ND.  Extremities: Distally wwp. No pedal edema  Skin: No acute rash on exposed areas.   Other: A0 x 4.  Grossly nonfocal.    Data   Recent Labs   Lab 11/20/21  0619 11/19/21  0514 11/18/21 2207 11/18/21  1859   WBC  --  9.9  --   --    HGB  --  9.4* 10.8*  --    MCV  --  88  --   --    PLT  --  245  --   --    NA  --  140 138  --    POTASSIUM  --  3.6  3.2* 2.6*   CHLORIDE  --  108 105  --    CO2  --  27 26  --    BUN  --  14 15  --    CR  --  0.78 0.69  --    ANIONGAP  --  5 7  --    RAMBO  --  8.2* 8.8  --    * 134* 150*  --      No results found for this or any previous visit (from the past 24 hour(s)).  Medications       acetaminophen  975 mg Oral Q8H     amLODIPine  10 mg Oral Daily     atorvastatin  80 mg Oral QPM     famotidine  20 mg Oral BID    Or     famotidine  20 mg Intravenous BID     gabapentin  300 mg Oral TID     levothyroxine  56 mcg Oral Daily     lisinopril  20 mg Oral BID     metoprolol succinate ER  50 mg Oral QAM     multivitamin w/minerals  1 tablet Oral Daily with lunch     polyethylene glycol  17 g Oral BID     potassium chloride ER  20 mEq Oral TID     scopolamine  1 patch Transdermal Q72H    And     scopolamine   Transdermal Q8H     senna-docusate  2 tablet Oral BID     sodium chloride (PF)  3 mL Intracatheter Q8H     vitamin C  500 mg Oral Daily

## 2021-11-20 NOTE — PLAN OF CARE
"      VS: BP (!) 141/81 (BP Location: Right arm)   Pulse 91   Temp 99.4  F (37.4  C) (Oral)   Resp 16   Ht 1.702 m (5' 7\")   Wt 74 kg (163 lb 2.3 oz)   SpO2 95%   BMI 25.55 kg/m       Output: Up to bedside commode, voiding spontaneously - has urgency and frequency. LBM: 11/7 - BS hypoactive.   RADHA: 25 mL   Lungs: Clear bilaterally, on RA. Denies chest pain or SOB.    Activity: SBA w/ walker to pivot to bedside commode. Steady. Able to reposition/turn in bed independently.    Skin: WDL ex L abd and spine incision   Pain:   Managed w/ PRN Oxy 5-10 mg, Robaxin, and scheduled Tylenol. Heat packs applied to back. Pt rating pain 4-5/10   Neuro/CMS:   A & Ox4, int forgetfulness. Baseline numbness in BLE.    Dressing(s):   L abd - CONNIE  Spine drsg - intact   Diet:   Regular - denies nausea. Scope patch behind R ear.    LDA:   R PIV - TKO  RADHA   Equipment:   IV pole, commode, walker, personal belongings   Plan:   Manage pain and encourage activity. Continue w/ POC.    Additional Info:   Hearing aids in container on bedside table.   BP and lab draws on R side only d/t L sided mastectomy and lymphedema.       "

## 2021-11-20 NOTE — PROGRESS NOTES
"Orthopedic Surgery Progress Note    Assessment and Plan:   71 year old female with PMH including HTN, HLD, prediabetes, hypothyroid now s/p L5-S1 PSF and OLIF with Dr. Ramirez on 11/18/2021.     Goal for 11/20: work w/ PT, XR if able, begin dispo planning     Ortho Primary, Medicine Comanaging, appreciate cares  Activity:   - Up with assist until independent. No excessive bending or twisting. No lifting >10 lbs x 6 weeks.   - If Barbara lift required for mobilization, please use high-back sling rather than universal sling. This is to minimize spine flexion.  Weight bearing status: WBAT.  Pain management:   - IV ketamine discontinued  - PO narcotics as tolerated. No NSAIDs x 3 months.   Antibiotics: Ancef until drain is out  Diet: Begin with clear fluids and progress diet as tolerated.   DVT prophylaxis: SCDs only. No chemical DVT ppx needed.  Imaging: XR Upright PTDC - ordered.  Labs: Hgb POD#1.  Bracing/Splinting: None.  Dressings: 4x4/tegaderm.  Drains: Document output per shift, will be discontinued at Orthopedic Surgery discretion.  Fong catheter: Remove POD#1.  Physical Therapy/Occupational Therapy: Eval and treat.  Cultures: none.    Consults: Hospitalist, PT/OT, SW.  Follow-up: Clinic with Dr. Ramirez in 6 weeks with repeat x-rays.   Disposition: Pending progress with therapies, pain control on orals, and medical stability, anticipate discharge to home Sunday or Monday        Francesco Martinez MD  Orthopaedic Surgery, PGY-4  Pager: [phone]       =========================================================    Subjective:   NAEO. Pain controlled on orals. Ketamine drip stopped,  Fong out. Voiding, tolerating food, no BM yet. Mobilized up and down hallway yesterday.    Exam:  BP (!) 141/81 (BP Location: Right arm)   Pulse 91   Temp 99.4  F (37.4  C) (Oral)   Resp 16   Ht 1.702 m (5' 7\")   Wt 74 kg (163 lb 2.3 oz)   SpO2 95%   BMI 25.55 kg/m    Gen: Awake, alert, NAD  Resp: non-labored breathing  CV: Extr " wwp  MSK: Dressing c/d/i. Drain w/ serosanguinous output  Toes wwp. SILT L3-S1 bilaterally.              L2-3: Hip flexion L and R 5/5 strength               L4:  Knee extension L and R 5/5 strength              L5:  Foot / EHL dorsiflexion L and R 4/5 strength              S1:  Plantarflexion  L and R 5/5 strength    Drain: 25ml overnight shift    Labs:  Recent Labs   Lab Test 11/18/21  0904 11/05/21  1324 05/07/21  1234 11/09/20  1544 05/12/20  1118 10/27/16  1121 09/15/16  1411   HGB  --  11.5* 11.5*  --  12.3   < >  --    CRP  --   --   --   --   --   --  <2.9   WBC  --  8.0 7.9  --  7.8   < >  --    PLT  --  255 291  --  255   < >  --    CR  --  0.70 0.74  0.73 0.72 0.59   < > 0.55   * 99 98  99  --  112*   < > 95    < > = values in this interval not displayed.       Imaging:  Lumbar films when able

## 2021-11-20 NOTE — PLAN OF CARE
Pt c/o abdominal discomfort related to constipation. Rates Back and abd 4/10. No results for earlier fleets enema. Small yumiko after suppository. Pt states that she takes magnesium po at bedtime. Dr. Ovalles called and order obtained. Order for mag citrate also ordered but pt declined. IV Dc'd. Pt declined pain medication. Nausea improved. Scope patch behind left ear.

## 2021-11-20 NOTE — PLAN OF CARE
"      VS: /75 (BP Location: Right arm)   Pulse 96   Temp 99.9  F (37.7  C) (Oral)   Resp 16   Ht 1.702 m (5' 7\")   Wt 74 kg (163 lb 2.3 oz)   SpO2 98%   BMI 25.55 kg/m       Output: Fong pulled this evening, pt voiding without difficulty, baseline urgency/frequency -- pt wears peripad. LBM 11/17, hypoactive BS.   Lungs: Lung sounds clear, 3L O2 via NC.   Activity: Assist of 1 with walker and gait belt, pivoting to BSC this evening.   Skin: Intact ex incision on left abdomen CONNIE and spinal incision covered with gauze/tegaderm dressing.   Pain:   Pain in back managed with oxycodone q3hrs prn, robaxin q6hrs prn, heat packs.   Neuro/CMS:   A&Ox4, baseline numbness in bilateral feet.   Dressing(s):   Gauze and tegaderm dressing on back C/D/I.   Diet:   Regular diet, tolerating well, fair appetite.   LDA:   Right PIV running TKO, RADHA.   Equipment:   IV pole, PCDs, gait belt, walker, BSC.   Plan:   Continue to manage pain, continue POC.   Additional Info:   All blood draws and Bps to be completed on right extremity, given pt's hx left mastectomy/lymphedema on left side. RN managed K+ protocol. Wears bilateral hearing aids. Call light within reach, pt able to make needs known.       "

## 2021-11-21 ENCOUNTER — APPOINTMENT (OUTPATIENT)
Dept: OCCUPATIONAL THERAPY | Facility: CLINIC | Age: 71
DRG: 454 | End: 2021-11-21
Attending: ORTHOPAEDIC SURGERY
Payer: COMMERCIAL

## 2021-11-21 ENCOUNTER — APPOINTMENT (OUTPATIENT)
Dept: PHYSICAL THERAPY | Facility: CLINIC | Age: 71
DRG: 454 | End: 2021-11-21
Attending: ORTHOPAEDIC SURGERY
Payer: COMMERCIAL

## 2021-11-21 ENCOUNTER — APPOINTMENT (OUTPATIENT)
Dept: GENERAL RADIOLOGY | Facility: CLINIC | Age: 71
DRG: 454 | End: 2021-11-21
Attending: ORTHOPAEDIC SURGERY
Payer: COMMERCIAL

## 2021-11-21 LAB — GLUCOSE BLDC GLUCOMTR-MCNC: 116 MG/DL (ref 70–99)

## 2021-11-21 PROCEDURE — 72100 X-RAY EXAM L-S SPINE 2/3 VWS: CPT

## 2021-11-21 PROCEDURE — 250N000013 HC RX MED GY IP 250 OP 250 PS 637: Performed by: CLINICAL NURSE SPECIALIST

## 2021-11-21 PROCEDURE — 97116 GAIT TRAINING THERAPY: CPT | Mod: GP

## 2021-11-21 PROCEDURE — 97530 THERAPEUTIC ACTIVITIES: CPT | Mod: GP

## 2021-11-21 PROCEDURE — 97535 SELF CARE MNGMENT TRAINING: CPT | Mod: GO

## 2021-11-21 PROCEDURE — 72100 X-RAY EXAM L-S SPINE 2/3 VWS: CPT | Mod: 26 | Performed by: RADIOLOGY

## 2021-11-21 PROCEDURE — 250N000013 HC RX MED GY IP 250 OP 250 PS 637: Performed by: PHYSICIAN ASSISTANT

## 2021-11-21 PROCEDURE — 250N000013 HC RX MED GY IP 250 OP 250 PS 637: Performed by: INTERNAL MEDICINE

## 2021-11-21 PROCEDURE — 99232 SBSQ HOSP IP/OBS MODERATE 35: CPT | Performed by: INTERNAL MEDICINE

## 2021-11-21 PROCEDURE — 120N000002 HC R&B MED SURG/OB UMMC

## 2021-11-21 PROCEDURE — 250N000013 HC RX MED GY IP 250 OP 250 PS 637: Performed by: STUDENT IN AN ORGANIZED HEALTH CARE EDUCATION/TRAINING PROGRAM

## 2021-11-21 RX ORDER — MINERAL OIL 100 G/100G
1 OIL RECTAL ONCE
Status: DISCONTINUED | OUTPATIENT
Start: 2021-11-21 | End: 2021-11-22 | Stop reason: HOSPADM

## 2021-11-21 RX ORDER — GABAPENTIN 300 MG/1
600 CAPSULE ORAL 3 TIMES DAILY
Status: COMPLETED | OUTPATIENT
Start: 2021-11-21 | End: 2021-11-21

## 2021-11-21 RX ORDER — POLYETHYLENE GLYCOL 3350 17 G/17G
17 POWDER, FOR SOLUTION ORAL 3 TIMES DAILY
Status: DISCONTINUED | OUTPATIENT
Start: 2021-11-21 | End: 2021-11-22 | Stop reason: HOSPADM

## 2021-11-21 RX ADMIN — LISINOPRIL 20 MG: 10 TABLET ORAL at 14:01

## 2021-11-21 RX ADMIN — OXYCODONE HYDROCHLORIDE 10 MG: 5 TABLET ORAL at 14:01

## 2021-11-21 RX ADMIN — POLYETHYLENE GLYCOL 3350 17 G: 17 POWDER, FOR SOLUTION ORAL at 08:08

## 2021-11-21 RX ADMIN — OXYCODONE HYDROCHLORIDE 10 MG: 5 TABLET ORAL at 23:25

## 2021-11-21 RX ADMIN — MAGNESIUM CITRATE 286 ML: 1.75 LIQUID ORAL at 10:17

## 2021-11-21 RX ADMIN — OXYCODONE HYDROCHLORIDE 10 MG: 5 TABLET ORAL at 17:12

## 2021-11-21 RX ADMIN — MAGNESIUM CITRATE 296 ML: 1.75 LIQUID ORAL at 10:58

## 2021-11-21 RX ADMIN — BISACODYL 10 MG: 10 SUPPOSITORY RECTAL at 12:56

## 2021-11-21 RX ADMIN — POTASSIUM CHLORIDE 20 MEQ: 750 TABLET, EXTENDED RELEASE ORAL at 07:59

## 2021-11-21 RX ADMIN — OXYCODONE HYDROCHLORIDE 10 MG: 5 TABLET ORAL at 07:56

## 2021-11-21 RX ADMIN — GABAPENTIN 600 MG: 300 CAPSULE ORAL at 19:56

## 2021-11-21 RX ADMIN — OXYCODONE HYDROCHLORIDE AND ACETAMINOPHEN 500 MG: 500 TABLET ORAL at 08:00

## 2021-11-21 RX ADMIN — GABAPENTIN 600 MG: 300 CAPSULE ORAL at 14:01

## 2021-11-21 RX ADMIN — FAMOTIDINE 20 MG: 20 TABLET ORAL at 19:56

## 2021-11-21 RX ADMIN — MULTIPLE VITAMINS W/ MINERALS TAB 1 TABLET: TAB at 12:46

## 2021-11-21 RX ADMIN — LISINOPRIL 20 MG: 10 TABLET ORAL at 22:48

## 2021-11-21 RX ADMIN — POLYETHYLENE GLYCOL 3350 17 G: 17 POWDER, FOR SOLUTION ORAL at 14:01

## 2021-11-21 RX ADMIN — POTASSIUM CHLORIDE 20 MEQ: 750 TABLET, EXTENDED RELEASE ORAL at 14:00

## 2021-11-21 RX ADMIN — POLYETHYLENE GLYCOL 3350 17 G: 17 POWDER, FOR SOLUTION ORAL at 19:56

## 2021-11-21 RX ADMIN — DOCUSATE SODIUM 50MG AND SENNOSIDES 8.6MG 2 TABLET: 8.6; 5 TABLET, FILM COATED ORAL at 08:02

## 2021-11-21 RX ADMIN — Medication 56 MCG: at 14:02

## 2021-11-21 RX ADMIN — DOCUSATE SODIUM 50MG AND SENNOSIDES 8.6MG 2 TABLET: 8.6; 5 TABLET, FILM COATED ORAL at 19:56

## 2021-11-21 RX ADMIN — OXYCODONE HYDROCHLORIDE 10 MG: 5 TABLET ORAL at 00:00

## 2021-11-21 RX ADMIN — ACETAMINOPHEN 975 MG: 325 TABLET, FILM COATED ORAL at 12:46

## 2021-11-21 RX ADMIN — POTASSIUM CHLORIDE 20 MEQ: 750 TABLET, EXTENDED RELEASE ORAL at 19:56

## 2021-11-21 RX ADMIN — OXYCODONE HYDROCHLORIDE 10 MG: 5 TABLET ORAL at 03:55

## 2021-11-21 RX ADMIN — ATORVASTATIN CALCIUM 80 MG: 40 TABLET, FILM COATED ORAL at 19:56

## 2021-11-21 RX ADMIN — METOPROLOL SUCCINATE 50 MG: 50 TABLET, EXTENDED RELEASE ORAL at 08:01

## 2021-11-21 RX ADMIN — ACETAMINOPHEN 975 MG: 325 TABLET, FILM COATED ORAL at 03:55

## 2021-11-21 RX ADMIN — ACETAMINOPHEN 650 MG: 325 TABLET, FILM COATED ORAL at 07:56

## 2021-11-21 RX ADMIN — GABAPENTIN 300 MG: 300 CAPSULE ORAL at 08:01

## 2021-11-21 RX ADMIN — AMLODIPINE BESYLATE 10 MG: 10 TABLET ORAL at 08:02

## 2021-11-21 RX ADMIN — FAMOTIDINE 20 MG: 20 TABLET ORAL at 08:01

## 2021-11-21 RX ADMIN — OXYCODONE HYDROCHLORIDE 10 MG: 5 TABLET ORAL at 20:16

## 2021-11-21 ASSESSMENT — ACTIVITIES OF DAILY LIVING (ADL)
ADLS_ACUITY_SCORE: 14

## 2021-11-21 NOTE — PROGRESS NOTES
"Orthopedic Surgery Progress Note    Assessment and Plan:   71 year old female with PMH including HTN, HLD, prediabetes, hypothyroid now s/p L5-S1 PSF and OLIF with Dr. Ramirez on 11/18/2021.     Goal for 11/21: work w/ PT, XR, enema, plan discharge for Monday     Ortho Primary, Medicine Comanaging, appreciate cares  Activity:   - Up with assist until independent. No excessive bending or twisting. No lifting >10 lbs x 6 weeks.   - If Barbara lift required for mobilization, please use high-back sling rather than universal sling. This is to minimize spine flexion.  Weight bearing status: WBAT.  Pain management:   - IV ketamine discontinued  - PO narcotics as tolerated. No NSAIDs x 3 months.   Antibiotics: Ancef until drain is out  Diet: Begin with clear fluids and progress diet as tolerated.   DVT prophylaxis: SCDs only. No chemical DVT ppx needed.  Imaging: XR Upright PTDC - ordered.  Labs: Hgb POD#1.  Bracing/Splinting: None.  Dressings: 4x4/tegaderm.  Drains: Document output per shift, will be discontinued at Orthopedic Surgery discretion.  Fong catheter: Remove POD#1.  Physical Therapy/Occupational Therapy: Eval and treat.  Cultures: none.    Consults: Hospitalist, PT/OT, SW.  Follow-up: Clinic with Dr. Ramirez in 6 weeks with repeat x-rays.   Disposition: Pending progress with therapies, pain control on orals, and medical stability, anticipate discharge to home Sunday or Monday        Francesco Martinez MD  Orthopaedic Surgery, PGY-4  Pager: [phone]       =========================================================    Subjective:   NAEO. Ambulating in barclay. Pain controlled on orals. Voiding, no BM yet. Working w/ PT. Likely discharge home tomorrow.    Exam:  /78 (BP Location: Right arm, Patient Position: Supine)   Pulse 86   Temp 98.4  F (36.9  C) (Oral)   Resp 16   Ht 1.702 m (5' 7\")   Wt 74 kg (163 lb 2.3 oz)   SpO2 96%   BMI 25.55 kg/m    Gen: Awake, alert, NAD  Resp: non-labored breathing  CV: Extr " wwp  MSK: Dressing c/d/i. Drain w/ serosanguinous output  Toes wwp. SILT L3-S1 bilaterally.              L2-3: Hip flexion L and R 5/5 strength               L4:  Knee extension L and R 5/5 strength              L5:  Foot / EHL dorsiflexion L and R 4/5 strength              S1:  Plantarflexion  L and R 5/5 strength    Drain: 15/45/20 ml    Labs:  Recent Labs   Lab Test 11/18/21  0904 11/05/21  1324 05/07/21  1234 11/09/20  1544 05/12/20  1118 10/27/16  1121 09/15/16  1411   HGB  --  11.5* 11.5*  --  12.3   < >  --    CRP  --   --   --   --   --   --  <2.9   WBC  --  8.0 7.9  --  7.8   < >  --    PLT  --  255 291  --  255   < >  --    CR  --  0.70 0.74  0.73 0.72 0.59   < > 0.55   * 99 98  99  --  112*   < > 95    < > = values in this interval not displayed.       Imaging:  Lumbar films when able

## 2021-11-21 NOTE — PLAN OF CARE
Physical Therapy Discharge Summary    Reason for therapy discharge:    All goals and outcomes met, no further needs identified.    Progress towards therapy goal(s). See goals on Care Plan in [facility] electronic health record for goal details.  Goals met    Therapy recommendation(s):    Continued therapy is recommended.  Rationale/Recommendations:  OP PT for high level balance/dizziness.

## 2021-11-21 NOTE — PLAN OF CARE
"      VS:   /78 (BP Location: Right arm, Patient Position: Supine)   Pulse 86   Temp 98.4  F (36.9  C) (Oral)   Resp 16   Ht 1.702 m (5' 7\")   Wt 74 kg (163 lb 2.3 oz)   SpO2 96%   BMI 25.55 kg/m    VSS -on RA, afebrile, denies SOB, or N/V   Output:   Voids adequately without difficulty -pt has urgency and fequency  Up to bedside commode with walker.  BS hypoactive. LMB: 11/7, passing flatus    Activity:   SBA , pivots to bedside commode with walker.   Steady and repositions in bed independently.    Skin: WDL ex. left lower abdominal incision - erythema but getting better. Spine incision and mastectomy     Pain:   Pt rates pain 6/10, well managed with scheduled tylenol 975 mg, PRN 5-10 mg Oxycodone, and Robaxin    Neuro/CMS:   Alert and oriented x 4  Baseline BLE numbness present    Dressing(s):   Left lower abd -CONNIE  Spine drsg intact    Diet:   Regular diet -well tolerated   Pt denies nausea -scope patch behind L ear    LDA:   RADHA drain   Equipment:   Commode, walker, call light,    Plan:   Encourage IS use, and ambulation. Manage pain. Able to make needs known, will continue to follow POC   Additional Info:   Complete BP and lab draws on right arm only  Bilateral hearing aid at bedside in a container        "

## 2021-11-21 NOTE — PLAN OF CARE
Occupational Therapy Discharge Summary    Reason for therapy discharge:    All goals and outcomes met, no further needs identified.    Progress towards therapy goal(s). See goals on Care Plan in [facility] electronic health record for goal details.  Goals met    Therapy recommendation(s):    No further therapy is recommended. Home with assist for ADL prn/IADLs and use of DME to facilitate IND.

## 2021-11-21 NOTE — PLAN OF CARE
"VS: BP (!) 149/88 (BP Location: Right arm)   Pulse 94   Temp 100.1  F (37.8  C) (Oral)   Resp 16   Ht 1.702 m (5' 7\")   Wt 74 kg (163 lb 2.3 oz)   SpO2 97%   BMI 25.55 kg/m     Low grade temp - continue to encourage IS use   Output: Up to bedside commode, voiding spontaneously - has urgency and frequency.   LBM 11/7 - BS normoactive, bowel medications administered as well as mag per MAR - patient reports taking at home which helps with regular BM's  RADHA drain 30 mL   Lungs: Clear bilaterally. Denies chest pain or SOB.    Activity: SBA w/ walker, pivots to bedside commode. Steady when up. Able to reposition independently.    Skin: Spine incision  L abdominal/hip incision - redness, monitor  Masectomy  L wrist hx fusion   R wrist cyst like lump   Pain:    Managed w/ PRN Oxy 5-10 mg, Robaxin, and scheduled Tylenol. Pt rating pain 4 and 5 out of 10, pain of back and some abdominal discomfort   Neuro/CMS:    A&Ox4. CMS intact x baseline numbness in BLE.    Dressing(s):    L abd - CONNIE  Spine drsg - intact   Diet:    Regular - denies nausea. Scope patch behind L ear.    LDA:    R PIV - TKO  RADHA drain   Equipment:    Call light, commode, walker, personal belongings   Plan:    Manage pain and encourage activity, encourage BM, continue POC.    Additional Info:    Patient has bilateral hearing aids     BP and lab draws on R side only     "

## 2021-11-21 NOTE — PROGRESS NOTES
"   VS:    /90 (!) (BP Location: Right arm, Patient Position: Supine)   Pulse 91   Temp 99.1  F   Resp 16   Ht 1.702 m (5' 7\")   Wt 74 kg (163 lb 2.3 oz)   SpO2 99%   BMI 25.55 kg/m     Output:    Voids adequately without difficulty -pt has urgency and fequency  Up to bedside commode with walker.  Last BM: 11/17. Passing gas. Normal bowel sounds. Patient received pink lady enema, dulcolax suppository, and magnesium citrate to little effect.     Activity:    SBA , pivots to bedside commode with walker.   Steady and repositions in bed independently.    Skin: WDL ex. left lower abdominal incision - erythema but getting better. Spine incision and mastectomy     Pain:    Pt rates pain 5/10, well managed with scheduled tylenol 975 mg, PRN 5-10 mg Oxycodone, and Robaxin    Neuro/CMS:    Alert and oriented x 4  Baseline BLE numbness present   Some forgetfulness with opioids   Dressing(s):    Left lower abd -CONNIE  Spine drsg intact    Diet:    Regular diet -well tolerated   Pt has nausea with movement and small amounts of vomit -managed with scopalamin patch behind L ear    LDA:    RADHA drain removed by Provider this shift   Equipment:    Commode, walker, call light,    Plan:    Encourage IS use, and ambulation. Manage pain. Continue to encourage bowel movement. Able to make needs known, will continue to follow POC   Additional Info:    Complete BP and lab draws on right arm only  Bilateral hearing aid at bedside in a container         "

## 2021-11-21 NOTE — PROGRESS NOTES
[facility]    Medicine Progress Note - Hospitalist Service       Date of Admission:  11/18/2021    Assessment & Plan           Nadira Perez is a 71 year old female admitted on 11/18/2021 s/p POD 0 s/p L5-S1 PSF and OLIF w/ Dr. Ramirez.  Doing well. EBL:  200mL with 0mL returned via cellsaver. No complications.      Currently doing well.     Comorbid conditions of hypertension, hyperlipidemia, neuropathy, prediabetes, hypothyroidism, history of nephrolithiasis, history of breast cancer, dermatitis, history of squamous cell carcinoma, degenerative disc disease, scoliosis, carpal tunnel syndrome, knee pain and osteoporosis:     Today's changes:   11/21/2021    Overall doing better.   Constipation+ no other concern.  Changes: Increase bowel regimen.   No other medical concern.        # Orthopedic Surgery:      Post Op Management per Primary team.   Hemodynamics: stable currently.   Pain control- per Primary team , better controlled   Aggressive bowel regimen with narcotics.   Encourage Incentive spirometry to prevent atelectasis.  DVT prophylaxis- per Primary team.  Activity, antibiotics, wound and/or drain care - per Primary team.      Cardiovascular:  Currently asymptomatic.  Vitals: Stable.  History of PVCs.  Preop EKG: Normal sinus with LVH.     Hypertension: PTA amlodipine, metoprolol, lisinopril: Continue with hold parameters.  Hold PTA diuretics.  Pain control  Monitor blood pressure     Hyperlipidemia:  Continue PTA Lipitor     Hematology:   Anemia: Follow hemoglobin for anemia of acute blood loss.  Transfuse to keep above 7.  Preop hemoglobin 11.5.  Hemoglobin 11/19: 9.4.     Carpal tunnel, neuropathy: PTA on gabapentin.  Continue.     Hypothyroidism: Continue PTA levothyroxine.     Oncology: History of breast cancer s/p bilateral mastectomy, radiation and chemotherapy.   Doing well.     Hypokalemia: Acute on chronic.  Potassium 2.6.  11/18.   Continue PTA  potassium supplement.  RN managed potassium, standard protocol  Better.     Skin: Dermatitis - continue topical cream.   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 She has some dysphagia issues.   ~ Bilateral hearing loss with hearing aids.        Rest per primary.      Thank you for the consultation. Please page with any question. Hospitalist team to follow.       The patient's care was discussed with the Bedside Nurse and Patient.    Poncho Ovalles MD  Hospitalist Service  [facility]  Securely message with the Vocera Web Console (learn more here)  Text page via [facility] Paging/Directory          ______________________________________________________________________    Interval History   Interval events reviewed.   States doing well  Denies fever or chills.   No cough or cp or sob.   No LH or dizziness.   No NV or pain abdomen.  Flatus present.    No new sensory or motor complaint.   No new rash.    No other new or acute medical concern      Data reviewed today: I reviewed all medications, new labs and imaging results over the last 24 hours. I personally reviewed no images or EKG's today.    Physical Exam   Vital Signs: Temp: 99.1  F (37.3  C) Temp src: Oral BP: (!) 157/90 Pulse: 100   Resp: 16 SpO2: 99 % O2 Device: None (Room air)    Weight: 163 lbs 2.25 oz    General Appearance: Awake, interactive, NAD  Respiratory: Normal work of breathing.    Cardiovascular: S1 S2 Regular  GI: Soft. NT. ND.  Extremities: Distally wwp. No pedal edema  Skin: No acute rash on exposed areas.   Other: A0 x 4.  Grossly nonfocal.    Data   Recent Labs   Lab 11/20/21  2120 11/20/21  1740 11/20/21  1018 11/20/21  0619 11/19/21  0514 11/18/21  2207 11/18/21 2207 11/18/21  1859   WBC  --   --   --   --  9.9  --   --   --    HGB  --   --   --   --  9.4*   --  10.8*  --    MCV  --   --   --   --  88  --   --   --    PLT  --   --   --   --  245  --   --   --    NA  --   --   --   --  140  --  138  --    POTASSIUM  --   --   --   --  3.6  --  3.2* 2.6*   CHLORIDE  --   --   --   --  108  --  105  --    CO2  --   --   --   --  27  --  26  --    BUN  --   --   --   --  14  --  15  --    CR  --   --   --   --  0.78  --  0.69  --    ANIONGAP  --   --   --   --  5  --  7  --    RAMBO  --   --   --   --  8.2*  --  8.8  --    * 149* 128*   < > 134*   < > 150*  --     < > = values in this interval not displayed.     No results found for this or any previous visit (from the past 24 hour(s)).  Medications       amLODIPine  10 mg Oral Daily     atorvastatin  80 mg Oral QPM     famotidine  20 mg Oral BID    Or     famotidine  20 mg Intravenous BID     gabapentin  600 mg Oral TID     levothyroxine  56 mcg Oral Daily     lisinopril  20 mg Oral BID     metoprolol succinate ER  50 mg Oral QAM     mineral oil  1 enema Rectal Once     multivitamin w/minerals  1 tablet Oral Daily with lunch     polyethylene glycol  17 g Oral TID     potassium chloride ER  20 mEq Oral TID     scopolamine  1 patch Transdermal Q72H    And     scopolamine   Transdermal Q8H     senna-docusate  2 tablet Oral BID     sodium chloride (PF)  3 mL Intracatheter Q8H     vitamin C  500 mg Oral Daily

## 2021-11-21 NOTE — PROGRESS NOTES
Care Management Initial Consult    General Information  Assessment completed with: Patient (Nadira)  Type of CM/SW Visit: Initial Assessment    Primary Care Provider verified and updated as needed: Yes   Readmission within the last 30 days:        Reason for Consult: discharge planning  Advance Care Planning: Not discussed        Communication Assessment  Patient's communication style: spoken language (English or Bilingual)    Hearing Difficulty or Deaf: yes   Wear Glasses or Blind: yes    Cognitive  Cognitive/Neuro/Behavioral: WDL  Level of Consciousness: alert  Arousal Level: opens eyes spontaneously  Orientation: oriented x 4  Mood/Behavior: cooperative,calm  Best Language: 0 - No aphasia  Speech: clear,spontaneous    Living Environment:   People in home: alone     Current living Arrangements: house      Able to return to prior arrangements: no     Family/Social Support:  Care provided by: other (see comments) (brother and sister in law)  Provides care for: no one, unable/limited ability to care for self  Marital Status:    (room mate)          Description of Support System: Supportive,Involved    Support Assessment: Adequate family and caregiver support    Current Resources:   Patient receiving home care services: No  Community Resources: outpatient physical therapy  Equipment currently used at home: cane, straight,walker, rolling  Supplies currently used at home: None    Employment/Financial:  Employment Status: retired     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  Financial Concerns:        Lifestyle & Psychosocial Needs:  Social Determinants of Health     Tobacco Use: Low Risk      Smoking Tobacco Use: Never Smoker     Smokeless Tobacco Use: Never Used   Alcohol Use: Not on file   Financial Resource Strain: Not on file   Food Insecurity: Not on file   Transportation Needs: Not on file   Physical Activity: Not on file   Stress: Not on file   Social Connections: Not on file   Intimate  Partner Violence: Not on file   Depression: Not at risk     PHQ-2 Score: 0   Housing Stability: Not on file       Functional Status:  Prior to admission patient needed assistance:    Ambulation-walker as needed.    Mental Health Status:  Mental Health Status: No Current Concerns       Chemical Dependency Status:  Chemical Dependency Status: No Current Concerns           Values/Beliefs:  Spiritual, Cultural Beliefs, Mosque Practices, Values that affect care:    Description of Beliefs that Will Affect Care:           Additional Information:  Nadira Perez is a 71 year old female who presents for pre-operative H & P in preparation for spinal [place] with [place] [facility] [place] [facility]    CM assessment being completed due to elevated unplanned readmission risk score.    Patient will be staying with her brother and sister-in-law after discharge for few nights before she feels comfortable to go to her home (a mile away from brother's home). She does not receive any in-home supports or services at this time. PT/OT recommends home with assist. She went to [facility] for outpatient Physical therapy and will contact them and make appointments after discharge. She stated that her sister-in-law is very helpful and also has a room-mate who would help her as needed. She has adequate caregiver and family support. 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 Sister in law will  her home.    No RNCC needs identified at this time. Care Management will continue to follow and assist with discharge planning as needed.    Monae Sidhu, RNCC, MSN

## 2021-11-22 ENCOUNTER — DOCUMENTATION ONLY (OUTPATIENT)
Dept: LAB | Facility: CLINIC | Age: 71
End: 2021-11-22
Payer: COMMERCIAL

## 2021-11-22 VITALS
WEIGHT: 163.14 LBS | HEIGHT: 67 IN | OXYGEN SATURATION: 97 % | DIASTOLIC BLOOD PRESSURE: 46 MMHG | SYSTOLIC BLOOD PRESSURE: 119 MMHG | BODY MASS INDEX: 25.61 KG/M2 | HEART RATE: 75 BPM | TEMPERATURE: 99.5 F | RESPIRATION RATE: 15 BRPM

## 2021-11-22 LAB
ATRIAL RATE - MUSE: 78 BPM
DIASTOLIC BLOOD PRESSURE - MUSE: NORMAL MMHG
INTERPRETATION ECG - MUSE: NORMAL
P AXIS - MUSE: 74 DEGREES
PR INTERVAL - MUSE: 170 MS
QRS DURATION - MUSE: 106 MS
QT - MUSE: 428 MS
QTC - MUSE: 487 MS
R AXIS - MUSE: 124 DEGREES
SYSTOLIC BLOOD PRESSURE - MUSE: NORMAL MMHG
T AXIS - MUSE: 64 DEGREES
VENTRICULAR RATE- MUSE: 78 BPM

## 2021-11-22 PROCEDURE — 250N000013 HC RX MED GY IP 250 OP 250 PS 637: Performed by: PHYSICIAN ASSISTANT

## 2021-11-22 PROCEDURE — 250N000013 HC RX MED GY IP 250 OP 250 PS 637: Performed by: STUDENT IN AN ORGANIZED HEALTH CARE EDUCATION/TRAINING PROGRAM

## 2021-11-22 PROCEDURE — 250N000013 HC RX MED GY IP 250 OP 250 PS 637: Performed by: INTERNAL MEDICINE

## 2021-11-22 PROCEDURE — 99232 SBSQ HOSP IP/OBS MODERATE 35: CPT | Performed by: INTERNAL MEDICINE

## 2021-11-22 RX ADMIN — OXYCODONE HYDROCHLORIDE 10 MG: 5 TABLET ORAL at 08:08

## 2021-11-22 RX ADMIN — OXYCODONE HYDROCHLORIDE 10 MG: 5 TABLET ORAL at 13:00

## 2021-11-22 RX ADMIN — OXYCODONE HYDROCHLORIDE AND ACETAMINOPHEN 500 MG: 500 TABLET ORAL at 08:07

## 2021-11-22 RX ADMIN — ACETAMINOPHEN 650 MG: 325 TABLET, FILM COATED ORAL at 08:08

## 2021-11-22 RX ADMIN — POLYETHYLENE GLYCOL 3350 17 G: 17 POWDER, FOR SOLUTION ORAL at 08:32

## 2021-11-22 RX ADMIN — Medication 56 MCG: at 08:32

## 2021-11-22 RX ADMIN — ACETAMINOPHEN 650 MG: 325 TABLET, FILM COATED ORAL at 13:00

## 2021-11-22 RX ADMIN — OXYCODONE HYDROCHLORIDE 10 MG: 5 TABLET ORAL at 02:40

## 2021-11-22 RX ADMIN — POLYETHYLENE GLYCOL 3350 17 G: 17 POWDER, FOR SOLUTION ORAL at 13:00

## 2021-11-22 RX ADMIN — MULTIPLE VITAMINS W/ MINERALS TAB 1 TABLET: TAB at 13:00

## 2021-11-22 RX ADMIN — METHOCARBAMOL 1000 MG: 500 TABLET ORAL at 10:50

## 2021-11-22 RX ADMIN — METOPROLOL SUCCINATE 50 MG: 50 TABLET, EXTENDED RELEASE ORAL at 08:07

## 2021-11-22 RX ADMIN — DOCUSATE SODIUM 50MG AND SENNOSIDES 8.6MG 2 TABLET: 8.6; 5 TABLET, FILM COATED ORAL at 08:09

## 2021-11-22 RX ADMIN — METHOCARBAMOL 1000 MG: 500 TABLET ORAL at 02:40

## 2021-11-22 RX ADMIN — FAMOTIDINE 20 MG: 20 TABLET ORAL at 08:08

## 2021-11-22 RX ADMIN — POTASSIUM CHLORIDE 20 MEQ: 750 TABLET, EXTENDED RELEASE ORAL at 08:08

## 2021-11-22 RX ADMIN — POTASSIUM CHLORIDE 20 MEQ: 750 TABLET, EXTENDED RELEASE ORAL at 13:00

## 2021-11-22 RX ADMIN — LISINOPRIL 20 MG: 10 TABLET ORAL at 13:00

## 2021-11-22 ASSESSMENT — ACTIVITIES OF DAILY LIVING (ADL)
ADLS_ACUITY_SCORE: 14
ADLS_ACUITY_SCORE: 12
ADLS_ACUITY_SCORE: 14
ADLS_ACUITY_SCORE: 14
ADLS_ACUITY_SCORE: 12
ADLS_ACUITY_SCORE: 14
ADLS_ACUITY_SCORE: 12
ADLS_ACUITY_SCORE: 14
ADLS_ACUITY_SCORE: 12

## 2021-11-22 NOTE — PLAN OF CARE
"VS: BP (!) 128/90 (BP Location: Right arm, Patient Position: Sitting)   Pulse 103   Temp 99.1  F (37.3  C) (Oral)   Resp 18   Ht 1.702 m (5' 7\")   Wt 74 kg (163 lb 2.3 oz)   SpO2 98%   BMI 25.55 kg/m     Continue to encourage IS use - had low grade temps    Output: Up to bedside commode, voiding spontaneously - has urgency and frequency.   LBM 11/22 - BS normoactive, has had many bowel medications including pink lady enema and suppository 11/21   Lungs: Clear bilaterally. Denies chest pain or SOB.    Activity: SBA w/ walker, pivots to bedside commode. Steady when up. Able to reposition independently.    Skin: Spine incision - sutures still in  L abdominal/hip incision - redness, monitor  Masectomy  L wrist hx fusion   R wrist cyst like lump   Pain: Pain of back managed w/ PRN Oxy 5-10 mg, Robaxin, and scheduled Tylenol.   Neuro/CMS:    A&Ox4. CMS intact x baseline numbness in BLE.    Dressing(s):    L abd/groin - CONNIE  Spine drsg - dressing changed due to saturation from drain removal - sutures still in place under gauze and tegaderm   Diet:    Regular - denies nausea. Scope patch behind L ear.    Equipment:    Call light, commode, walker, personal belongings   Plan:    Discharge home - manage pain, encourage activity, continue POC   Additional Info:    Patient has bilateral hearing aids      "

## 2021-11-22 NOTE — PROGRESS NOTES
[facility]    Medicine Progress Note - Hospitalist Service       Date of Admission:  11/18/2021    Assessment & Plan           Nadira Perez is a 71 year old female admitted on 11/18/2021 s/p POD 0 s/p L5-S1 PSF and OLIF w/ Dr. Ramirez.  Doing well. EBL:  200mL with 0mL returned via cellsaver. No complications.      Currently doing well.     Comorbid conditions of hypertension, hyperlipidemia, neuropathy, prediabetes, hypothyroidism, history of nephrolithiasis, history of breast cancer, dermatitis, history of squamous cell carcinoma, degenerative disc disease, scoliosis, carpal tunnel syndrome, knee pain and osteoporosis:     Today's changes:   11/22/2021  Overall doing better.   Had bowel movement.  No other concern.  Changes: None.  Medically stable for discharge.       # Orthopedic Surgery:      Post Op Management per Primary team.   Hemodynamics: stable currently.   Pain control- per Primary team , better controlled   Aggressive bowel regimen with narcotics.   Encourage Incentive spirometry to prevent atelectasis.  DVT prophylaxis- per Primary team.  Activity, antibiotics, wound and/or drain care - per Primary team.      Cardiovascular:  Currently asymptomatic.  Vitals: Stable.  History of PVCs.  Preop EKG: Normal sinus with LVH.     Hypertension: PTA amlodipine, metoprolol, lisinopril: Continue with hold parameters.  Hold PTA diuretics.  Pain control  Monitor blood pressure     Hyperlipidemia:  Continue PTA Lipitor     Hematology:   Anemia: Follow hemoglobin for anemia of acute blood loss.  Transfuse to keep above 7.  Preop hemoglobin 11.5.  Hemoglobin 11/19: 9.4.     Carpal tunnel, neuropathy: PTA on gabapentin.  Continue.     Hypothyroidism: Continue PTA levothyroxine.     Oncology: History of breast cancer s/p bilateral mastectomy, radiation and chemotherapy.   Doing well.     Hypokalemia: Acute on chronic.  Potassium 2.6.  11/18.   Continue PTA potassium  supplement.  RN managed potassium, standard protocol  Better.     Skin: Dermatitis - continue topical cream.   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 She has some dysphagia issues.   ~ Bilateral hearing loss with hearing aids.        Rest per primary.      Thank you for the consultation. Please page with any question. Hospitalist team to follow.       The patient's care was discussed with the Bedside Nurse, Care Coordinator/, Patient and Primary team.    Poncho Ovalles MD  Hospitalist Service  [facility]  Securely message with the Vocera Web Console (learn more here)  Text page via [facility] Paging/Directory          ______________________________________________________________________    Interval History   Interval events reviewed.   States doing well  Denies fever or chills.   No cough or cp or sob.   No LH or dizziness.   No NV or pain abdomen.  Had BM  No new sensory or motor complaint.   No new rash.    No other new or acute medical concern      Data reviewed today: I reviewed all medications, new labs and imaging results over the last 24 hours. I personally reviewed no images or EKG's today.    Physical Exam   Vital Signs: Temp: 99.5  F (37.5  C) Temp src: Oral BP: 119/46 Pulse: 75   Resp: 15 SpO2: 97 % O2 Device: None (Room air)    Weight: 163 lbs 2.25 oz    General Appearance: Awake, interactive, NAD  Respiratory: Normal work of breathing.  RA.  Cardiovascular: S1 S2 Regular  GI: Soft. NT.  Mild distention.  No guarding/rigidity.    Extremities: Distally wwp. No pedal edema  Skin: No acute rash on exposed areas.   Other: A0 x 4.  Grossly nonfocal.    Data   Recent Labs   Lab 11/21/21  1554 11/20/21  2120 11/20/21  1740 11/20/21  0619 11/19/21  0514 11/18/21  2207 11/18/21  2207 11/18/21  1859   WBC  --    --   --   --  9.9  --   --   --    HGB  --   --   --   --  9.4*  --  10.8*  --    MCV  --   --   --   --  88  --   --   --    PLT  --   --   --   --  245  --   --   --    NA  --   --   --   --  140  --  138  --    POTASSIUM  --   --   --   --  3.6  --  3.2* 2.6*   CHLORIDE  --   --   --   --  108  --  105  --    CO2  --   --   --   --  27  --  26  --    BUN  --   --   --   --  14  --  15  --    CR  --   --   --   --  0.78  --  0.69  --    ANIONGAP  --   --   --   --  5  --  7  --    RAMBO  --   --   --   --  8.2*  --  8.8  --    * 191* 149*   < > 134*   < > 150*  --     < > = values in this interval not displayed.     No results found for this or any previous visit (from the past 24 hour(s)).  Medications       amLODIPine  10 mg Oral Daily     atorvastatin  80 mg Oral QPM     famotidine  20 mg Oral BID    Or     famotidine  20 mg Intravenous BID     levothyroxine  56 mcg Oral Daily     lisinopril  20 mg Oral BID     metoprolol succinate ER  50 mg Oral QAM     mineral oil  1 enema Rectal Once     multivitamin w/minerals  1 tablet Oral Daily with lunch     polyethylene glycol  17 g Oral TID     potassium chloride ER  20 mEq Oral TID     scopolamine  1 patch Transdermal Q72H    And     scopolamine   Transdermal Q8H     senna-docusate  2 tablet Oral BID     sodium chloride (PF)  3 mL Intracatheter Q8H     vitamin C  500 mg Oral Daily

## 2021-11-22 NOTE — PLAN OF CARE
Pt c/o back pain. Dressing CDI. Rates pain 6/10. Baseline numbness and tingling in feet unchanged. Pedals+. Medicated for pain with oxycodone 10 mg po. Pt had 2 liquid stools  One approx 300 brown liquid, second stool small mushy brown. Pt states abd feeling better. Pt able to ambulate in halls with walker independently. Gait steady.

## 2021-11-22 NOTE — PROGRESS NOTES
"Orthopedic Surgery Progress Note    Assessment and Plan:   71 year old female with PMH including HTN, HLD, prediabetes, hypothyroid now s/p L5-S1 PSF and OLIF with Dr. Ramirez on 11/18/2021.     Goal for 11/22: DC  home     Ortho Primary, Medicine Comanaging, appreciate cares  Activity:   - Up with assist until independent. No excessive bending or twisting. No lifting >10 lbs x 6 weeks.   - If Barbara lift required for mobilization, please use high-back sling rather than universal sling. This is to minimize spine flexion.  Weight bearing status: WBAT.  Pain management:   - IV ketamine discontinued  - PO narcotics as tolerated. No NSAIDs x 3 months.   Antibiotics: Ancef until drain is out  Diet: Begin with clear fluids and progress diet as tolerated.   DVT prophylaxis: SCDs only. No chemical DVT ppx needed.  Imaging: XR Upright PTDC - ordered.  Labs: Hgb POD#1.  Bracing/Splinting: None.  Dressings: 4x4/tegaderm.  Drains: Document output per shift, will be discontinued at Orthopedic Surgery discretion.  Fong catheter: Remove POD#1.  Physical Therapy/Occupational Therapy: Eval and treat.  Cultures: none.    Consults: Hospitalist, PT/OT, SW.  Follow-up: Clinic with Dr. Ramirez in 6 weeks with repeat x-rays.   Disposition: Pending progress with therapies, pain control on orals, and medical stability, anticipate discharge to home Monday        Francesco Martinez MD  Orthopaedic Surgery, PGY-4  Pager: [phone]       =========================================================    Subjective:   NAEO. Ambulating in barclay. Pain controlled on orals. Voiding, having BM. XRs completed. Cleared PT. Discharge home today.    Exam:  BP (!) 128/90 (BP Location: Right arm, Patient Position: Sitting)   Pulse 103   Temp 99.1  F (37.3  C) (Oral)   Resp 18   Ht 1.702 m (5' 7\")   Wt 74 kg (163 lb 2.3 oz)   SpO2 98%   BMI 25.55 kg/m    Gen: Awake, alert, NAD  Resp: non-labored breathing  CV: Extr wwp  MSK: Dressing c/d/i. Drain w/ " serosanguinous output  Toes wwp. SILT L3-S1 bilaterally.              L2-3: Hip flexion L and R 5/5 strength               L4:  Knee extension L and R 5/5 strength              L5:  Foot / EHL dorsiflexion L and R 5/5 strength              S1:  Plantarflexion  L and R 5/5 strength    Drain: none    Labs:  Recent Labs   Lab Test 11/18/21  0904 11/05/21  1324 05/07/21  1234 11/09/20  1544 05/12/20  1118 10/27/16  1121 09/15/16  1411   HGB  --  11.5* 11.5*  --  12.3   < >  --    CRP  --   --   --   --   --   --  <2.9   WBC  --  8.0 7.9  --  7.8   < >  --    PLT  --  255 291  --  255   < >  --    CR  --  0.70 0.74  0.73 0.72 0.59   < > 0.55   * 99 98  99  --  112*   < > 95    < > = values in this interval not displayed.       Imaging:  Lumbar films complete

## 2021-11-22 NOTE — PLAN OF CARE
"      VS:   /46 (BP Location: Right arm)   Pulse 75   Temp 99.5  F (37.5  C) (Oral)   Resp 15   Ht 1.702 m (5' 7\")   Wt 74 kg (163 lb 2.3 oz)   SpO2 97%   BMI 25.55 kg/m     Neuro/CMS:   A/Ox4, numbness to BLE and n/t to R hand d/t cyst, strengths intact 5/5   O2/Respiratory:   RA, lungs clear bilaterally. Pt self-administering IS.   GI/ & Output:   Pt has some urinary urgency at baseline. LBM 11/21. Pt denies nausea.   Diet: Regular   Activity:   Up ad akosua. Pt walked in halls multiple times during shift.   Skin, Dressing, LDA:   Spinal incision - gauze and tegaderm CDI  L hip incision - open to air  Bilateral hip bruising  R wrist cyst   Pain:   Pt reports moderate pain. Pt given available PRNs   Equipment/Personnel:   Walker   Plan:   Pt will discharge around 2PM.   Additional Info:   Pt. discharged at 1400 to home and left with personal belongings. Pt. received complete discharge paperwork and narcotic medications as filled by discharge pharmacy. Pt. was given times of last dose for all discharge medications in writing on discharge medication sheets. Discharge teaching included medication, pain management, activity restrictions, dressing changes, and signs and symptoms of infection. Dressing supplies sent home. Pt. to follow up with orthopedic clinic in 1 week. Pt. had no further questions at the time of discharge and no unmet needs were identified.         "

## 2021-11-22 NOTE — PROGRESS NOTES
This patient has an upcoming lab visit at the [facility] lab. Are there orders you can place for her? She is apparently having a procedure outside of [facility] and needs a covid pre screen. Thanks for your help, lab staff at [facility].

## 2021-11-23 ENCOUNTER — PATIENT OUTREACH (OUTPATIENT)
Dept: CARE COORDINATION | Facility: CLINIC | Age: 71
End: 2021-11-23
Payer: COMMERCIAL

## 2021-11-23 DIAGNOSIS — Z71.89 OTHER SPECIFIED COUNSELING: ICD-10-CM

## 2021-11-23 NOTE — PROGRESS NOTES
Clinic Care Coordination Contact  [facility]: Post-Discharge Note  SITUATION                                                      Admission:    Admission Date: 11/14/21   Reason for Admission: Degenerative scoliosis in adult patient  Discharge:   Discharge Date: 11/22/21  Discharge Diagnosis: Degenerative scoliosis in adult patient    BACKGROUND                                                      71 year old female with PMH including HTN, HLD, prediabetes, hypothyroid now s/p L5-S1 PSF and OLIF with Dr. Ramirez on 11/18/2021.        ASSESSMENT      Enrollment  Primary Care Care Coordination Status: Not a Candidate    Discharge Assessment  How are you doing now that you are home?: Patient reports she is feel good and would like to thank everyone for the great care.  She really felt like it was a team approach.  No questions  How are your symptoms? (Red Flag symptoms escalate to triage hotline per guidelines): Improved  Do you feel your condition is stable enough to be safe at home until your provider visit?: Yes  Does the patient have their discharge instructions? : Yes  Does the patient have questions regarding their discharge instructions? : No  Were you started on any new medications or were there changes to any of your previous medications? : Yes  Does the patient have all of their medications?: Yes  Do you have questions regarding any of your medications? : No  Discharge follow-up appointment scheduled within 14 calendar days? : Yes  Discharge Follow Up Appointment Date: 12/12/21  Discharge Follow Up Appointment Scheduled with?: Specialty Care Provider    Post-op ([facility] Only)  If the patient had a surgery or procedure, do they have any questions for a nurse?: No             PLAN                                                      Outpatient Plan:      Follow-up: Clinic with Dr. Ramirez in 6 weeks with repeat x-rays    Future Appointments   Date Time Provider Department Center   11/29/2021  2:45 PM   COVID LAB [facility]   12/6/2021  7:00 AM Jovon Zavaleta [facility]   12/13/2021  9:00 AM Evie Lancaster, PTA [facility]   12/14/2021  3:30 PM Serge Ramirez MD [facility]   12/15/2021  8:30 AM Jennifer Jauregui, PT [facility]   12/17/2021  9:00 AM Willis So [facility]   12/23/2021  9:40 AM Evie Lancaster, PTA [facility]   1/4/2022 11:45 AM Serge Ramirez MD [facility]   1/17/2022 11:30 AM [facility]   1/19/2022  1:30 PM [facility]   11/17/2022 11:00 AM Ashley Perry PA-C [facility]         For any urgent concerns, please contact our 24 hour nurse triage line: [phone] ([phone])         Radha Walters [facility]  [phone]  [facility]

## 2021-11-24 DIAGNOSIS — G56.03 BILATERAL CARPAL TUNNEL SYNDROME: Primary | ICD-10-CM

## 2021-11-26 ENCOUNTER — TELEPHONE (OUTPATIENT)
Dept: ORTHOPEDICS | Facility: CLINIC | Age: 71
End: 2021-11-26
Payer: COMMERCIAL

## 2021-11-26 NOTE — TELEPHONE ENCOUNTER
My Chart Message was sent to Nadira  at the time of this phone call to reassure her that it has not been cancelled as far as we are aware. We are still planning for her to have the procedure on 12-2-21. The insurance prior authorization person Aparna FLORES Is planning to call her on Monday to finalize planning for insurance due to the fact this is work comp case and we have not heard if they will re-open this case yet.  Provided phone numbers to patient for this insurance person and also for Daniel in scheduling.  Daniel is unsure why it fell off the schedule for that day but he has contacted the OR team to put it back on the schedule.  That team is off today 11-26-21 for the holiday. See My Chart message to patient to see what was communicated to her. Christina Cox RN

## 2021-11-26 NOTE — TELEPHONE ENCOUNTER
[facility] Call Center    Phone Message    May a detailed message be left on voicemail: yes     Reason for Call: Other: Patient would like to know why her surgery was cancelled.       She did not receive a call.  It's just not on her MyChart anymore.  DOS was 12/2/2021.    Action Taken: Message routed to:  [facility] Ortho    Travel Screening: Not Applicable

## 2021-11-29 ENCOUNTER — MYC REFILL (OUTPATIENT)
Dept: INTERNAL MEDICINE | Facility: CLINIC | Age: 71
End: 2021-11-29
Payer: COMMERCIAL

## 2021-11-29 ENCOUNTER — LAB (OUTPATIENT)
Dept: LAB | Facility: CLINIC | Age: 71
End: 2021-11-29
Payer: OTHER MISCELLANEOUS

## 2021-11-29 DIAGNOSIS — G56.03 BILATERAL CARPAL TUNNEL SYNDROME: ICD-10-CM

## 2021-11-29 DIAGNOSIS — M48.062 SPINAL STENOSIS OF LUMBAR REGION WITH NEUROGENIC CLAUDICATION: ICD-10-CM

## 2021-11-29 DIAGNOSIS — E87.6 HYPOPOTASSEMIA: ICD-10-CM

## 2021-11-29 PROCEDURE — U0003 INFECTIOUS AGENT DETECTION BY NUCLEIC ACID (DNA OR RNA); SEVERE ACUTE RESPIRATORY SYNDROME CORONAVIRUS 2 (SARS-COV-2) (CORONAVIRUS DISEASE [COVID-19]), AMPLIFIED PROBE TECHNIQUE, MAKING USE OF HIGH THROUGHPUT TECHNOLOGIES AS DESCRIBED BY CMS-2020-01-R: HCPCS

## 2021-11-29 PROCEDURE — U0005 INFEC AGEN DETEC AMPLI PROBE: HCPCS

## 2021-11-30 LAB — SARS-COV-2 RNA RESP QL NAA+PROBE: NEGATIVE

## 2021-12-01 ENCOUNTER — ANESTHESIA EVENT (OUTPATIENT)
Dept: SURGERY | Facility: AMBULATORY SURGERY CENTER | Age: 71
End: 2021-12-01
Payer: OTHER MISCELLANEOUS

## 2021-12-01 RX ORDER — METHOCARBAMOL 500 MG/1
1000 TABLET, FILM COATED ORAL 4 TIMES DAILY PRN
Qty: 40 TABLET | Refills: 0 | Status: SHIPPED | OUTPATIENT
Start: 2021-12-01 | End: 2021-12-07

## 2021-12-01 RX ORDER — POTASSIUM CHLORIDE 1500 MG/1
TABLET, EXTENDED RELEASE ORAL
Qty: 90 TABLET | Refills: 3 | OUTPATIENT
Start: 2021-12-01

## 2021-12-02 ENCOUNTER — HOSPITAL ENCOUNTER (OUTPATIENT)
Facility: AMBULATORY SURGERY CENTER | Age: 71
End: 2021-12-02
Attending: STUDENT IN AN ORGANIZED HEALTH CARE EDUCATION/TRAINING PROGRAM | Admitting: STUDENT IN AN ORGANIZED HEALTH CARE EDUCATION/TRAINING PROGRAM
Payer: OTHER MISCELLANEOUS

## 2021-12-02 ENCOUNTER — OFFICE VISIT (OUTPATIENT)
Dept: ORTHOPEDICS | Facility: CLINIC | Age: 71
End: 2021-12-02
Payer: COMMERCIAL

## 2021-12-02 ENCOUNTER — ANCILLARY PROCEDURE (OUTPATIENT)
Dept: RADIOLOGY | Facility: AMBULATORY SURGERY CENTER | Age: 71
End: 2021-12-02
Attending: STUDENT IN AN ORGANIZED HEALTH CARE EDUCATION/TRAINING PROGRAM
Payer: COMMERCIAL

## 2021-12-02 ENCOUNTER — ANESTHESIA (OUTPATIENT)
Dept: SURGERY | Facility: AMBULATORY SURGERY CENTER | Age: 71
End: 2021-12-02
Payer: OTHER MISCELLANEOUS

## 2021-12-02 VITALS
DIASTOLIC BLOOD PRESSURE: 82 MMHG | HEIGHT: 67 IN | WEIGHT: 162 LBS | TEMPERATURE: 98.1 F | HEART RATE: 86 BPM | OXYGEN SATURATION: 97 % | SYSTOLIC BLOOD PRESSURE: 121 MMHG | BODY MASS INDEX: 25.43 KG/M2 | RESPIRATION RATE: 14 BRPM

## 2021-12-02 DIAGNOSIS — M54.16 LUMBAR RADICULOPATHY: Primary | ICD-10-CM

## 2021-12-02 DIAGNOSIS — R52 PAIN: ICD-10-CM

## 2021-12-02 DIAGNOSIS — G56.01 RIGHT CARPAL TUNNEL SYNDROME: ICD-10-CM

## 2021-12-02 PROCEDURE — 87186 SC STD MICRODIL/AGAR DIL: CPT | Mod: 90 | Performed by: PATHOLOGY

## 2021-12-02 PROCEDURE — 99024 POSTOP FOLLOW-UP VISIT: CPT

## 2021-12-02 PROCEDURE — 26080 EXPLORE/TREAT FINGER JOINT: CPT | Mod: RT | Performed by: STUDENT IN AN ORGANIZED HEALTH CARE EDUCATION/TRAINING PROGRAM

## 2021-12-02 PROCEDURE — 87077 CULTURE AEROBIC IDENTIFY: CPT | Mod: 90 | Performed by: PATHOLOGY

## 2021-12-02 PROCEDURE — 99000 SPECIMEN HANDLING OFFICE-LAB: CPT | Performed by: PATHOLOGY

## 2021-12-02 PROCEDURE — 20680 REMOVAL OF IMPLANT DEEP: CPT | Mod: RT

## 2021-12-02 PROCEDURE — 87176 TISSUE HOMOGENIZATION CULTR: CPT | Mod: 90 | Performed by: PATHOLOGY

## 2021-12-02 PROCEDURE — 87075 CULTR BACTERIA EXCEPT BLOOD: CPT | Mod: 90 | Performed by: PATHOLOGY

## 2021-12-02 PROCEDURE — 87070 CULTURE OTHR SPECIMN AEROBIC: CPT | Mod: 90 | Performed by: PATHOLOGY

## 2021-12-02 PROCEDURE — 64721 CARPAL TUNNEL SURGERY: CPT | Mod: RT | Performed by: STUDENT IN AN ORGANIZED HEALTH CARE EDUCATION/TRAINING PROGRAM

## 2021-12-02 PROCEDURE — 64721 CARPAL TUNNEL SURGERY: CPT | Mod: RT

## 2021-12-02 RX ORDER — METOPROLOL TARTRATE 1 MG/ML
INJECTION, SOLUTION INTRAVENOUS PRN
Status: DISCONTINUED | OUTPATIENT
Start: 2021-12-02 | End: 2021-12-02

## 2021-12-02 RX ORDER — IBUPROFEN 200 MG
600 TABLET ORAL
Status: DISCONTINUED | OUTPATIENT
Start: 2021-12-02 | End: 2021-12-03 | Stop reason: HOSPADM

## 2021-12-02 RX ORDER — PROPOFOL 10 MG/ML
INJECTION, EMULSION INTRAVENOUS PRN
Status: DISCONTINUED | OUTPATIENT
Start: 2021-12-02 | End: 2021-12-02

## 2021-12-02 RX ORDER — PROPOFOL 10 MG/ML
INJECTION, EMULSION INTRAVENOUS CONTINUOUS PRN
Status: DISCONTINUED | OUTPATIENT
Start: 2021-12-02 | End: 2021-12-02

## 2021-12-02 RX ORDER — LIDOCAINE 40 MG/G
CREAM TOPICAL
Status: DISCONTINUED | OUTPATIENT
Start: 2021-12-02 | End: 2021-12-02 | Stop reason: HOSPADM

## 2021-12-02 RX ORDER — CEFAZOLIN SODIUM 2 G/50ML
2 SOLUTION INTRAVENOUS
Status: COMPLETED | OUTPATIENT
Start: 2021-12-02 | End: 2021-12-02

## 2021-12-02 RX ORDER — OXYCODONE HYDROCHLORIDE 5 MG/1
5 TABLET ORAL EVERY 4 HOURS PRN
Status: DISCONTINUED | OUTPATIENT
Start: 2021-12-02 | End: 2021-12-03 | Stop reason: HOSPADM

## 2021-12-02 RX ORDER — KETAMINE HYDROCHLORIDE 10 MG/ML
INJECTION, SOLUTION INTRAMUSCULAR; INTRAVENOUS PRN
Status: DISCONTINUED | OUTPATIENT
Start: 2021-12-02 | End: 2021-12-02

## 2021-12-02 RX ORDER — LABETALOL HYDROCHLORIDE 5 MG/ML
INJECTION, SOLUTION INTRAVENOUS PRN
Status: DISCONTINUED | OUTPATIENT
Start: 2021-12-02 | End: 2021-12-02

## 2021-12-02 RX ORDER — OXYCODONE HYDROCHLORIDE 5 MG/1
5 TABLET ORAL
Status: DISCONTINUED | OUTPATIENT
Start: 2021-12-02 | End: 2021-12-03 | Stop reason: HOSPADM

## 2021-12-02 RX ORDER — ONDANSETRON 2 MG/ML
4 INJECTION INTRAMUSCULAR; INTRAVENOUS EVERY 30 MIN PRN
Status: DISCONTINUED | OUTPATIENT
Start: 2021-12-02 | End: 2021-12-03 | Stop reason: HOSPADM

## 2021-12-02 RX ORDER — CHLOROPROCAINE HYDROCHLORIDE 30 MG/ML
INJECTION, SOLUTION EPIDURAL; INFILTRATION; INTRACAUDAL; PERINEURAL PRN
Status: DISCONTINUED | OUTPATIENT
Start: 2021-12-02 | End: 2021-12-02

## 2021-12-02 RX ORDER — LIDOCAINE HYDROCHLORIDE AND EPINEPHRINE 10; 10 MG/ML; UG/ML
10 INJECTION, SOLUTION INFILTRATION; PERINEURAL ONCE
Status: DISCONTINUED | OUTPATIENT
Start: 2021-12-02 | End: 2021-12-02 | Stop reason: HOSPADM

## 2021-12-02 RX ORDER — OXYCODONE HYDROCHLORIDE 5 MG/1
5 TABLET ORAL EVERY 6 HOURS PRN
Qty: 15 TABLET | Refills: 0 | Status: SHIPPED | OUTPATIENT
Start: 2021-12-02 | End: 2021-12-17

## 2021-12-02 RX ORDER — ROPIVACAINE HYDROCHLORIDE 5 MG/ML
INJECTION, SOLUTION EPIDURAL; INFILTRATION; PERINEURAL
Status: COMPLETED | OUTPATIENT
Start: 2021-12-02 | End: 2021-12-02

## 2021-12-02 RX ORDER — ACETAMINOPHEN 325 MG/1
975 TABLET ORAL ONCE
Status: DISCONTINUED | OUTPATIENT
Start: 2021-12-02 | End: 2021-12-02 | Stop reason: HOSPADM

## 2021-12-02 RX ORDER — SODIUM CHLORIDE, SODIUM LACTATE, POTASSIUM CHLORIDE, CALCIUM CHLORIDE 600; 310; 30; 20 MG/100ML; MG/100ML; MG/100ML; MG/100ML
INJECTION, SOLUTION INTRAVENOUS CONTINUOUS
Status: DISCONTINUED | OUTPATIENT
Start: 2021-12-02 | End: 2021-12-03 | Stop reason: HOSPADM

## 2021-12-02 RX ORDER — CEFAZOLIN SODIUM 2 G/50ML
2 SOLUTION INTRAVENOUS SEE ADMIN INSTRUCTIONS
Status: DISCONTINUED | OUTPATIENT
Start: 2021-12-02 | End: 2021-12-02 | Stop reason: HOSPADM

## 2021-12-02 RX ORDER — SODIUM CHLORIDE, SODIUM LACTATE, POTASSIUM CHLORIDE, CALCIUM CHLORIDE 600; 310; 30; 20 MG/100ML; MG/100ML; MG/100ML; MG/100ML
INJECTION, SOLUTION INTRAVENOUS CONTINUOUS
Status: DISCONTINUED | OUTPATIENT
Start: 2021-12-02 | End: 2021-12-02 | Stop reason: HOSPADM

## 2021-12-02 RX ORDER — ACETAMINOPHEN 325 MG/1
650 TABLET ORAL EVERY 4 HOURS PRN
Qty: 50 TABLET | Refills: 0 | Status: SHIPPED | OUTPATIENT
Start: 2021-12-02 | End: 2021-12-17

## 2021-12-02 RX ORDER — FENTANYL CITRATE 50 UG/ML
INJECTION, SOLUTION INTRAMUSCULAR; INTRAVENOUS PRN
Status: DISCONTINUED | OUTPATIENT
Start: 2021-12-02 | End: 2021-12-02

## 2021-12-02 RX ORDER — LABETALOL HYDROCHLORIDE 5 MG/ML
10 INJECTION, SOLUTION INTRAVENOUS ONCE
Status: COMPLETED | OUTPATIENT
Start: 2021-12-02 | End: 2021-12-02

## 2021-12-02 RX ORDER — IBUPROFEN 600 MG/1
600 TABLET, FILM COATED ORAL EVERY 6 HOURS PRN
Qty: 30 TABLET | Refills: 0 | Status: SHIPPED | OUTPATIENT
Start: 2021-12-02 | End: 2023-03-31 | Stop reason: DRUGHIGH

## 2021-12-02 RX ORDER — HYDRALAZINE HYDROCHLORIDE 20 MG/ML
INJECTION INTRAMUSCULAR; INTRAVENOUS PRN
Status: DISCONTINUED | OUTPATIENT
Start: 2021-12-02 | End: 2021-12-02

## 2021-12-02 RX ORDER — FENTANYL CITRATE 50 UG/ML
25 INJECTION, SOLUTION INTRAMUSCULAR; INTRAVENOUS EVERY 5 MIN PRN
Status: DISCONTINUED | OUTPATIENT
Start: 2021-12-02 | End: 2021-12-02 | Stop reason: HOSPADM

## 2021-12-02 RX ORDER — ONDANSETRON 4 MG/1
4 TABLET, ORALLY DISINTEGRATING ORAL EVERY 30 MIN PRN
Status: DISCONTINUED | OUTPATIENT
Start: 2021-12-02 | End: 2021-12-03 | Stop reason: HOSPADM

## 2021-12-02 RX ORDER — FENTANYL CITRATE 50 UG/ML
50 INJECTION, SOLUTION INTRAMUSCULAR; INTRAVENOUS ONCE
Status: COMPLETED | OUTPATIENT
Start: 2021-12-02 | End: 2021-12-02

## 2021-12-02 RX ADMIN — LABETALOL HYDROCHLORIDE 10 MG: 5 INJECTION, SOLUTION INTRAVENOUS at 10:16

## 2021-12-02 RX ADMIN — LABETALOL HYDROCHLORIDE 5 MG: 5 INJECTION, SOLUTION INTRAVENOUS at 10:01

## 2021-12-02 RX ADMIN — FENTANYL CITRATE 50 MCG: 50 INJECTION, SOLUTION INTRAMUSCULAR; INTRAVENOUS at 11:32

## 2021-12-02 RX ADMIN — FENTANYL CITRATE 50 MCG: 50 INJECTION, SOLUTION INTRAMUSCULAR; INTRAVENOUS at 09:59

## 2021-12-02 RX ADMIN — PROPOFOL 125 MCG/KG/MIN: 10 INJECTION, EMULSION INTRAVENOUS at 10:07

## 2021-12-02 RX ADMIN — FENTANYL CITRATE 50 MCG: 50 INJECTION, SOLUTION INTRAMUSCULAR; INTRAVENOUS at 11:09

## 2021-12-02 RX ADMIN — ONDANSETRON 4 MG: 2 INJECTION INTRAMUSCULAR; INTRAVENOUS at 12:29

## 2021-12-02 RX ADMIN — ROPIVACAINE HYDROCHLORIDE 15 ML: 5 INJECTION, SOLUTION EPIDURAL; INFILTRATION; PERINEURAL at 08:55

## 2021-12-02 RX ADMIN — KETAMINE HYDROCHLORIDE 10 MG: 10 INJECTION, SOLUTION INTRAMUSCULAR; INTRAVENOUS at 10:35

## 2021-12-02 RX ADMIN — HYDRALAZINE HYDROCHLORIDE 8 MG: 20 INJECTION INTRAMUSCULAR; INTRAVENOUS at 10:05

## 2021-12-02 RX ADMIN — FENTANYL CITRATE 50 MCG: 50 INJECTION, SOLUTION INTRAMUSCULAR; INTRAVENOUS at 08:57

## 2021-12-02 RX ADMIN — PROPOFOL 175 MCG/KG/MIN: 10 INJECTION, EMULSION INTRAVENOUS at 10:22

## 2021-12-02 RX ADMIN — PROPOFOL 40 MG: 10 INJECTION, EMULSION INTRAVENOUS at 09:06

## 2021-12-02 RX ADMIN — LABETALOL HYDROCHLORIDE 5 MG: 5 INJECTION, SOLUTION INTRAVENOUS at 10:05

## 2021-12-02 RX ADMIN — FENTANYL CITRATE 50 MCG: 50 INJECTION, SOLUTION INTRAMUSCULAR; INTRAVENOUS at 09:04

## 2021-12-02 RX ADMIN — Medication 8 MCG: at 10:42

## 2021-12-02 RX ADMIN — CHLOROPROCAINE HYDROCHLORIDE 10 ML: 30 INJECTION, SOLUTION EPIDURAL; INFILTRATION; INTRACAUDAL; PERINEURAL at 08:55

## 2021-12-02 RX ADMIN — PROPOFOL 75 MCG/KG/MIN: 10 INJECTION, EMULSION INTRAVENOUS at 09:36

## 2021-12-02 RX ADMIN — METOPROLOL TARTRATE 1 MG: 1 INJECTION, SOLUTION INTRAVENOUS at 11:07

## 2021-12-02 RX ADMIN — PROPOFOL 200 MCG/KG/MIN: 10 INJECTION, EMULSION INTRAVENOUS at 10:36

## 2021-12-02 RX ADMIN — PROPOFOL 200 MCG/KG/MIN: 10 INJECTION, EMULSION INTRAVENOUS at 10:52

## 2021-12-02 RX ADMIN — KETAMINE HYDROCHLORIDE 10 MG: 10 INJECTION, SOLUTION INTRAMUSCULAR; INTRAVENOUS at 10:56

## 2021-12-02 RX ADMIN — PROPOFOL 50 MCG/KG/MIN: 10 INJECTION, EMULSION INTRAVENOUS at 09:04

## 2021-12-02 RX ADMIN — FENTANYL CITRATE 50 MCG: 50 INJECTION, SOLUTION INTRAMUSCULAR; INTRAVENOUS at 09:50

## 2021-12-02 RX ADMIN — LABETALOL HYDROCHLORIDE 10 MG: 5 INJECTION, SOLUTION INTRAVENOUS at 11:35

## 2021-12-02 RX ADMIN — METOPROLOL TARTRATE 1 MG: 1 INJECTION, SOLUTION INTRAVENOUS at 10:46

## 2021-12-02 RX ADMIN — CEFAZOLIN SODIUM 2 G: 2 SOLUTION INTRAVENOUS at 09:15

## 2021-12-02 RX ADMIN — SODIUM CHLORIDE, SODIUM LACTATE, POTASSIUM CHLORIDE, CALCIUM CHLORIDE: 600; 310; 30; 20 INJECTION, SOLUTION INTRAVENOUS at 07:55

## 2021-12-02 RX ADMIN — Medication 0.5 MG: at 10:22

## 2021-12-02 RX ADMIN — OXYCODONE HYDROCHLORIDE 5 MG: 5 TABLET ORAL at 11:51

## 2021-12-02 RX ADMIN — Medication 12 MCG: at 10:34

## 2021-12-02 RX ADMIN — SODIUM CHLORIDE, SODIUM LACTATE, POTASSIUM CHLORIDE, CALCIUM CHLORIDE: 600; 310; 30; 20 INJECTION, SOLUTION INTRAVENOUS at 10:16

## 2021-12-02 RX ADMIN — FENTANYL CITRATE 50 MCG: 50 INJECTION, SOLUTION INTRAMUSCULAR; INTRAVENOUS at 08:59

## 2021-12-02 ASSESSMENT — LIFESTYLE VARIABLES: TOBACCO_USE: 0

## 2021-12-02 ASSESSMENT — ENCOUNTER SYMPTOMS
DYSRHYTHMIAS: 1
SEIZURES: 0

## 2021-12-02 ASSESSMENT — MIFFLIN-ST. JEOR: SCORE: 1292.46

## 2021-12-02 NOTE — ANESTHESIA CARE TRANSFER NOTE
Patient: Nadira Perez    Procedure: Procedure(s):  RIGHT CARPAL TUNNEL RELEASE, RIGHT WRIST HARDWARE REMOVAL, RIGHT CARPAL BOSS EXCISION       Diagnosis: Right carpal tunnel syndrome [G56.01]  Diagnosis Additional Information: No value filed.    Anesthesia Type:   MAC     Note:    Oropharynx: spontaneously breathing  Level of Consciousness: awake  Oxygen Supplementation: nasal cannula  Level of Supplemental Oxygen (L/min / FiO2): 2  Independent Airway: airway patency satisfactory and stable  Dentition: dentition unchanged    Report to RN Given: handoff report given  Patient transferred to: PACU  Comments: Brought pt. To phase 2 and sats 88% so pt. Placed on NC.  /100 pt. Stated in pain 6/10 in fingers.  dsuvia given by me.  Decision made to transfer pt. To PACU for closer monitoring.  Dr. Goss notified and took over care.  Once in PACU spo2 94-95%  Handoff Report: Identifed the Patient, Identified the Reponsible Provider, Reviewed the pertinent medical history, Discussed the surgical course, Reviewed Intra-OP anesthesia mangement and issues during anesthesia, Set expectations for post-procedure period and Allowed opportunity for questions and acknowledgement of understanding      Vitals:  Vitals Value Taken Time   BP     Temp     Pulse     Resp     SpO2         Electronically Signed By: MERCEDES Fulton CRNA  December 2, 2021  11:31 AM

## 2021-12-02 NOTE — ANESTHESIA PROCEDURE NOTES
Brachial plexus Procedure Note    Pre-Procedure   Staff -        Anesthesiologist:  Maycol Miranda MD       Performed By: anesthesiologist       Location: pre-op       Procedure Start/Stop Times: 12/2/2021 8:48 AM and 12/2/2021 8:55 AM       Pre-Anesthestic Checklist: patient identified, IV checked, site marked, risks and benefits discussed, informed consent, monitors and equipment checked, pre-op evaluation, at physician/surgeon's request and post-op pain management  Timeout:       Correct Patient: Yes        Correct Procedure: Yes        Correct Site: Yes        Correct Position: Yes        Correct Laterality: Yes        Site Marked: Yes  Procedure Documentation  Procedure: Brachial plexus       Laterality: right       Patient Position: sitting       Patient Prep/Sterile Barriers: sterile gloves, mask       Skin prep: Chloraprep (supraclavicular approach).       Needle Type: short bevel       Needle Gauge: 21.        Needle Length (millimeters): 100        Ultrasound guided       1. Ultrasound was used to identify targeted nerve, plexus, vascular marker, or fascial plane and place a needle adjacent to it in real-time.       2. Ultrasound was used to visualize the spread of anesthetic in close proximity to the above referenced structure.       3. A permanent image is entered into the patient's record.       4. The visualized anatomic structures appeared normal.       5. There were no apparent abnormal pathologic findings.    Assessment/Narrative         The placement was negative for: blood aspirated, painful injection and site bleeding       Paresthesias: No.     Bolus given via needle..        Secured via.        Insertion/Infusion Method: Single Shot       Complications: none       Injection made incrementally with aspirations every 5 mL.    Medication(s) Administered   Ropivacaine 0.5% PF (Infiltration), 15 mL  Medication Administration Time: 12/2/2021 8:55 AM

## 2021-12-02 NOTE — DISCHARGE INSTRUCTIONS
Procedure Performed: Right carpal tunnel release, excision of carpal boss and removal of hardware  Attending Surgeon: Rolan Conley MD  Date: 12/2/2021    DIAGNOSIS  1. Right carpal tunnel syndrome        MEDICATIONS   1. Resume all home medications as directed unless otherwise instructed during this hospitalization. IF THERE IS ANY QUESTION, double check with your doctors or your primary care provider.  2. Start new discharge medications as directed.    Take 1 tablet of 650 mg Tylenol Arthritis Strength (Extended Release) and 1 tablet of Aleve 220 mg in the morning with breakfast and and in the evening with dinner.      For breakthrough pain use narcotic pain medication as prescribed.    3. Do not drive or operate machinery while taking narcotic pain medications.   4. If you are taking other Tylenol containing medicines at home, be sure NOT to exceed 4 gram's (4000 milligrams) of Tylenol per day.   5. If you are taking pain medications, be sure to take COLACE (docusate sodium) as well to prevent constipation. If constipated, try adding another cathartic or enema.  If nausea and vomiting, call the hospital or seek medical attention.    ACTIVITY   1. Weight bearing: Non-weight bearing to arm.    DIET  1. Resume same diet prior to your hospital admission.    WOUND   1. Leave dressing on  until you are seen in clinic for your follow up visit.   2. Watch for signs and symptoms of infection of your wounds including; pain, redness, swelling, drainage or fever.  If you notice any of these symptoms please seek medical attention.    3. Keep wound clean, dry and intact.  4. Do not submerge wounds in water until they are healed.     RETURN   1. Follow-up with Orthopaedic Clinic as directed.     ***  Future Appointments   Date Time Provider Department Center   12/6/2021  7:00 AM Joovn Zavaleta [facility]   12/13/2021  9:00 AM Evie Lancaster PTA [facility]   12/13/2021  1:30 PM Rolan Conley MD [facility]   12/14/2021   3:30 PM Serge Ramirez MD [facility]   12/15/2021  8:30 AM Jennifer Jauregui, PT [facility]   12/17/2021  9:00 [facility]   12/23/2021  9:40 AM Evie Lancaster, GORAN [facility]   1/4/2022 11:45 AM Serge Ramirez MD [facility]   1/17/2022 11:30 AM [email or web address]   1/19/2022  1:30 PM Felix Carlos MD [facility]   11/17/2022 11:00 AM Ashley Perry PA-C [facility] [place]       2. Call the [facility] at [phone] during business hours for any symptoms such as:    * Fevers with Temperature greater than 101.5 degrees.   * Pus drainage from wound site.   * Severe pain, not controlled by medication.   * Persistent nausea, vomiting and inablility to tolerate fluids.    -FOR URGENT PROBLEMS ONLY, after hours or on weekends call the hospital  at [phone] and ask to speak with the Orthopaedic resident on call.    3. You may call the Orthopaedic Clinic at [phone] with questions or concerns.    4. You may also be seen at our Orthopaedic Walk-In clinic that runs 7 days per week from 7a-7 at [address] [place] [address]. You can call the Walk-In Clinic at [phone]        Post Operative Instructions: Regional Anesthetic for Upper Extremity    General Information:   Regional anesthesia is when local anesthetic or  numbing  medication is injected around the nerves to anesthetize or  numb  the area supplied by that set of nerves.      Types of Regional Blocks:  Interscalene: A block injected into the neck on the operative shoulder/arm of a patient having shoulder surgery  Supraclavicular: A block injected near the clavicle on the operative shoulder of a patient having elbow, forearm, or hand surgery    Procedure:  The type of anesthesia your doctor used to numb your shoulder or arm will usually not wear off for 6-18 hours, but may last as long as 24 hours. You should be careful during that period,  since it is possible to injure your arm without being aware of the injury. While your arm is numb, you should:    Avoid striking or bumping your arm    Avoid extreme hot or cold    Diet:  There are no restrictions on your diet. You should drink plenty of fluids.     Discomfort:  You will have a tingling and prickly sensation in your arm as the feeling begins to return. You can also expect some discomfort. The amount of discomfort is unpredictable, but if you have more pain than can be controlled with pain medication you should notify your physician.     Pain Medicine:   Begin taking your oral pain pills (if you have not already done so) before bedtime and during the night to avoid a sudden onset of pain as the block wears off.  Do not engage in drinking, driving, or hazardous occupations while taking pain medication.     Stitches:   You may have stitches or special skin closures. You doctor will inform you when to return to the office to have them removed.     Activity:  On the day of surgery you should try to stay in bed with your hand elevated on pillows. You may resume your normal activity after that, wearing a sling for comfort. Contact your physician if you have any of the problems:     Continued numbness or tingling in the arm or hand after 24 hours    Swelling of the fingers or fingers that are cold to the touch    Excessive bleeding or drainage    Severe pain        [facility]  Home Care Following Anesthesia  For 24 hours after surgery:  1. Get plenty of rest.  A responsible adult must stay with you for at least 24 hours after you leave the surgery center.  2. Do not drive or use heavy equipment.  If you have weakness or tingling, don't drive or use heavy equipment until this feeling goes away.   3. Do not drink alcohol.   4. Avoid strenuous or risky activities.  Ask for help when climbing stairs.  5. You may feel lightheaded.  IF so, sit for a few minutes before  "standing.  Have someone help you get up.   6. If you have nausea (feel sick to your stomach): Drink only clear liquids such as apple juice, ginger ale, broth or 7-Up.  Rest may also help.  Be sure to drink enough fluids.  Move to a regular diet as you feel able.   7. You may have a slight fever.  Call the doctor if your fever is over 100 F (37.7 C) (taken under the tongue) or lasts longer than 24 hours.  8. You may have a dry mouth, a sore throat, muscle aches or trouble sleeping. These should go away after 24 hours.  9. Do not make important or legal decisions.   10. It is recommended to avoid smoking.        Today you received a Marcaine or bupivacaine block to numb the nerves near your surgery site.  This is a block using local anesthetic or \"numbing\" medication injected around the nerves to anesthetize or \"numb\" the area supplied by those nerves.  This block is injected into the muscle layer near your surgical site.  The medication may numb the location where you had surgery for 6-18 hours, but may last up to 24 hours.  If your surgical site is an arm or leg you should be careful with your affected limb, since it is possible to injure your limb without being aware of it due to the numbing.  Until full feeling returns, you should guard against bumping or hitting your limb, and avoid extreme hot or cold temperatures on the skin.  As the block wears off, the feeling will return as a tingling or prickly sensation near your surgical site.  You will experience more discomfort from your incision as the feeling returns.  You may want to take a pain pill (a narcotic or Tylenol if this was prescribed by your surgeon) when you start to experience mild pain before the pain beccomes more severe.  If your pain medications do not control your pain you should notifiy your surgeon.    Tips for taking pain medications  To get the best pain relief possible, remember these points:    Take pain medications as directed, before pain " becomes severe.    Pain medication can upset your stomach: taking it with food may help.    Constipation is a common side effect of pain medication. Drink plenty of  fluids.    Eat foods high in fiber. Take a stool softener if recommended by your doctor or pharmacist.    Do not drink alcohol, drive or operate machinery while taking pain medications.    Ask about other ways to control pain, such as with heat, ice or relaxation.    Tylenol/Acetaminophen Consumption  To help encourage the safe use of acetaminophen, the makers of TYLENOL  have lowered the maximum daily dose for single-ingredient Extra Strength TYLENOL  (acetaminophen) products sold in the [place] from 8 pills per day (4,000 mg) to 6 pills per day (3,000 mg). The dosing interval has also changed from 2 pills every 4-6 hours to 2 pills every 6 hours.    If you feel your pain relief is insufficient, you may take Tylenol/Acetaminophen in addition to your narcotic pain medication.     Be careful not to exceed 3,000 mg of Tylenol/Acetaminophen in a 24 hour period from all sources.    If you are taking extra strength Tylenol/acetaminophen (500 mg), the maximum dose is 6 tablets in 24 hours.    If you are taking regular strength acetaminophen (325 mg), the maximum dose is 9 tablets in 24 hours.    Call a doctor for any of the followin. Signs of infection (fever, growing tenderness at the surgery site, a large amount of drainage or bleeding, severe pain, foul-smelling drainage, redness, swelling).  2. It has been over 8 to 10 hours since surgery and you are still not able to urinate (pass water).  3. Headache for over 24 hours.  4. Numbness, tingling or weakness the day after surgery (if you had spinal anesthesia).  5. Signs of Covid-19 infection (temperature over 100 degrees, shortness of breath, cough, loss of taste/smell, generalized body aches, persistent headache, chills, sore throat, nausea/vomiting/diarrhea)  Your doctor is:       Dr. Rolan Conley,  Orthopaedics: [phone]               Or dial [phone] and ask for the resident on call for:  Orthopaedics  For emergency care, call the:  [facility] Emergency Department: [phone] (TTY for hearing impaired: [phone])

## 2021-12-02 NOTE — ANESTHESIA PREPROCEDURE EVALUATION
Anesthesia Pre-Procedure Evaluation    Patient: Nadira Perez   MRN: [id number] : 1950        Preoperative Diagnosis: Right carpal tunnel syndrome [G56.01]    Procedure : Procedure(s):  RELEASE, CARPAL TUNNEL AND SCREW FIXATION            Past Medical History:   Diagnosis Date     Arthritis      Bone disease      Breast cancer (H)      H/O kyphoplasty      Hearing problem      History of kidney stones      History of radiation therapy      Hyperlipemia      Hypertension      Hypopotassemia      Kidney problem      Lymph edema      Medullary sponge kidney      Osteopenia      PONV (postoperative nausea and vomiting)      Reduced vision      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     Thyroid disease       Past Surgical History:   Procedure Laterality Date     ABDOMEN SURGERY      ovarian cyst, mesh     ARTHRODESIS WRIST Right      ARTHRODESIS WRIST  2013    Procedure: ARTHRODESIS WRIST;  left wrist scaphoid excision, four bone fusion, iliac crest bone graft  ( Mac with block);  Surgeon: Av Mendez MD;  [facility]     BIOPSY      skin, vaginal     CATARACT IOL, RT/LT Right 2018     CATARACT IOL, RT/LT Left 2018     COLONOSCOPY  2013    Procedure: COMBINED COLONOSCOPY, SINGLE BIOPSY/POLYPECTOMY BY BIOPSY;  COLONOSCOPY;  Surgeon: Dom Alvarez MD;  Location:  GI     ESOPHAGOSCOPY, GASTROSCOPY, DUODENOSCOPY (EGD), COMBINED N/A 2016    Procedure: COMBINED ESOPHAGOSCOPY, GASTROSCOPY, DUODENOSCOPY (EGD);  Surgeon: Quinten Feliciano MD;  Location:  GI     EXTERNAL EAR SURGERY      right     EYE SURGERY      radial keratomy     FUSION, SPINE, INTERBODY, OBLIQUE ANTERIOR AND LUMBAR, 2 LEVELS, POST APPROACH, USING OTS N/A 2021    Procedure: Part 1: Oblique Anterior Interbody Fusion at Lumbar 5 to sacral 1 with use of Bone Morphogenic Protein,;  Surgeon: Serge Ramirez MD;  Location: [facility]     GRAFT BONE FROM ILIAC CREST  2013     Procedure: GRAFT BONE FROM ILIAC CREST;  mac with block and local infilitration;  Surgeon: Av Mendez MD;  [facility]      BREATH HYDROGEN TEST N/A 10/14/2016    Procedure: HYDROGEN BREATH TEST;  Surgeon: Cheri Barron MD;  Location: [facility]     HERNIA REPAIR      umbilical age 18 mos.     HYSTERECTOMY TOTAL ABDOMINAL  05/03/2000     MASTECTOMY MODIFIED RADICAL Bilateral     bilateral; right breast prophylactic     OPTICAL TRACKING SYSTEM FUSION POSTERIOR SPINE LUMBAR N/A 11/18/2021    Procedure: Open Posterior Instrumented Spinal Fusion at Lumbar 5 to Sacral 1, with grimm aguayo osteotomy, use of O-Arm/Stealth;  Surgeon: Serge Ramirez MD;  Location: [facility]     PARATHYROIDECTOMY  09/23/2004    R SUPERIOR     PARATHYROIDECTOMY  09/23/2004    parathyroid resection, subtotal     REMOVE HARDWARE HAND  09/24/2013    Procedure: REMOVE HARDWARE HAND;  Left Hand Screw Removal        RHINOPLASTY  1968     thyr proc skin closed cosmetic manner by subcuticular stitch  01/23/2009     THYROPLASTY  10/09/2009     TONSILLECTOMY  1977     WRIST SURGERY      wrist arthrodesis      Allergies   Allergen Reactions     Erythromycin Nausea     Penicillins Hives     Around age 6 - doesn't recall full reaction, mother told her it was hives.     Has tolerated cephalsporins.       Social History     Tobacco Use     Smoking status: Never Smoker     Smokeless tobacco: Never Used   Substance Use Topics     Alcohol use: Not Currently     Alcohol/week: 7.0 standard drinks     Types: 7 Glasses of wine per week     Comment: Rare to occasional      Wt Readings from Last 1 Encounters:   12/02/21 73.5 kg (162 lb)        Anesthesia Evaluation   Pt has had prior anesthetic. Type: General.    History of anesthetic complications  - PONV.      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  She has dysphagia from this as well.     No intubation note from November surgery but no reported difficulties, patient reports she was told they used video laryngoscope    Hearing aids    (+) LOLY risk factors, snores loudly,  (-) tobacco use   Neurologic:     (+) peripheral neuropathy, - feet, hands.  (-) no seizures, no CVA and no TIA   Cardiovascular: Comment: Patient is s/p left axillary lymph node dissection and has lymphedema. She reports cannot have BP or IV on left arm.     (+) Dyslipidemia hypertension-----dysrhythmias, PVCs, Previous cardiac testing   Echo: Date: 2019 Results:  Interpretation Summary  Left ventricular function, chamber size, wall motion, and wall thickness are  normal.The EF is 60-65%.  Right ventricular function, chamber size, wall motion, and thickness are  normal.  No significant valvular abnormalities were noted.     Compared to the previous study from 6/29/2009, there has been no significant  change. PVCs observed during the study.    Stress Test: Date: Results:    ECG Reviewed: Date: 11/4/21 Results:  Sinus rhythm, LVH  Cath: Date: Results:      METS/Exercise Tolerance: 4 - Raking leaves, gardening    Hematologic:     (+) anemia,     Musculoskeletal: Comment: DDD  scoliosis  CTS on left   Knee pain    Osteoporosis    Patient uses a walker for balance    Possible fracture of right 2nd toe          GI/Hepatic:  - neg GI/hepatic ROS  (-) GERD   Renal/Genitourinary:       Endo: Comment: Impaired fasting glucose    (+) thyroid problem, hypothyroidism,     Psychiatric/Substance Use:  - neg psychiatric ROS     Infectious Disease:  - neg infectious disease ROS     Malignancy:   (+) Malignancy, History of Breast and Skin.Breast CA Remission status post Surgery, Chemo and Radiation.  Skin CA Remission status post Surgery.        Other: Comment: dermatitis           Physical Exam    Airway        Mallampati: I   TM distance: > 3 FB   Neck ROM: full   Mouth opening: > 3 cm    Respiratory Devices  and Support         Dental  no notable dental history         Cardiovascular       Comment: Small murmur     Rhythm and rate: regular and normal   (+) murmur       Pulmonary   pulmonary exam normal                OUTSIDE LABS:  CBC:   Lab Results   Component Value Date    WBC 9.9 11/19/2021    WBC 8.0 11/05/2021    HGB 9.4 (L) 11/19/2021    HGB 10.8 (L) 11/18/2021    HCT 28.6 (L) 11/19/2021    HCT 35.9 11/05/2021     11/19/2021     11/05/2021     BMP:   Lab Results   Component Value Date     11/19/2021     11/18/2021    POTASSIUM 3.6 11/19/2021    POTASSIUM 3.2 (L) 11/18/2021    CHLORIDE 108 11/19/2021    CHLORIDE 105 11/18/2021    CO2 27 11/19/2021    CO2 26 11/18/2021    BUN 14 11/19/2021    BUN 15 11/18/2021    CR 0.78 11/19/2021    CR 0.69 11/18/2021     (H) 11/21/2021     (H) 11/20/2021     COAGS:   Lab Results   Component Value Date    PTT 25 08/20/2010    INR 0.85 (L) 08/20/2010     POC:   Lab Results   Component Value Date    BGM 98 01/16/2008     HEPATIC:   Lab Results   Component Value Date    ALBUMIN 3.7 11/05/2021    ALBUMIN 3.7 11/05/2021    PROTTOTAL 6.8 11/05/2021    ALT 34 11/05/2021    AST 23 11/05/2021    ALKPHOS 63 11/05/2021    BILITOTAL 0.4 11/05/2021     OTHER:   Lab Results   Component Value Date    PH 7.39 11/18/2021    A1C 5.9 (H) 11/05/2021    RAMBO 8.2 (L) 11/19/2021    PHOS 3.0 11/21/2017    MAG 2.2 06/11/2009    LIPASE 177 09/15/2016    TSH 1.95 08/26/2021    T4 1.05 11/21/2011    CRP <2.9 09/15/2016    SED 6 05/12/2010       Anesthesia Plan    ASA Status:  3   NPO Status:  NPO Appropriate    Anesthesia Type: MAC.     - Reason for MAC: immobility needed, straight local not clinically adequate   Induction: Intravenous.   Maintenance: Balanced.   Techniques and Equipment:     - Lines/Monitors: 2nd IV     Consents    Anesthesia Plan(s) and associated risks, benefits, and realistic alternatives discussed. Questions answered and patient/representative(s)  expressed understanding.    - Discussed:     - Discussed with:  Patient      - Extended Intubation/Ventilatory Support Discussed: No.      - Patient is DNR/DNI Status: No    Use of blood products discussed: No .     Postoperative Care    Pain management: Peripheral nerve block (Single Shot), Multi-modal analgesia.   PONV prophylaxis: Ondansetron (or other 5HT-3), Background Propofol Infusion     Comments:                  Chad Goss MD

## 2021-12-02 NOTE — OP NOTE
Patient: Nadira Perez  : 1950  MRN: [id number]    DATE OF OPERATION: 2021      OPERATIVE REPORT       PREOPERATIVE DIAGNOSIS:  1) Right carpal tunnel syndrome  2) Loose body (screw) in radiocarpal joint     POSTOPERATIVE DIAGNOSIS:  1) Right carpal tunnel syndrome  2) Failure of four corner fusion and fractured hardware with loose screw in the radiocarpal joint     PROCEDURE:  1) Open right carpal tunnel release  2) Removal of buried implant (deep) right wrist    SURGEON:  Rolan Conley MD     ASSISTANT(S):  Fidel Cassidy MD    ANESTHESIA:  Regional + MAC     IMPLANTS:   None    ESTIMATED BLOOD LOSS:  10cc    DVT PROPHYLAXIS:  SCDs    TOURNIQUET TIME:  111 minutes     SPECIMENS REMOVED:  1x deep tissue culture    INTRAOPERATIVE FINDINGS:  Severely compressed right median nerve at the carpal tunnel. Dorsally, significant tenosynovitis and patulous capsule. Loose screw in the radiocarpal joint causing significant cartilage wear. Completely fractured Trimed fusion cup in 3 pieces with loose screws. Lunate and triquetrum appeared fused. No fusion across the midcarpal joint. Gross motion between capitate and hamate with fibrous bridging.     COMPLICATIONS:  None    DISPOSITION:  Stable to PACU.     INDICATIONS:  Nadira Perez is a 71 year old female with right hand pain, paresthesias, and thenar atrophy consistent with a diagnosis of right carpal tunnel syndrome.  EMG/nerve conduction study demonstrated the same.  She has failed conservative measures including a recent carpal tunnel injection.  She elected to proceed with a right carpal tunnel release.  The patient also has a history of a right scaphoidectomy and four corner fusion as well as a revision in the mid . Current radiographs also showed a loose screw within the radiocarpal joint. On exam she had also developed a large soft tissue swelling dorsally along her extensor tendons, likely due to dorsal capsular attenuation. Given  her persistent pain in her wrist for which she had also been getting serial injections, I recommended retrieval of the loose screw.    Indications for surgery were discussed with the patient in detail. After discussing risks, benefits and alternatives to procedure, the patient elected to proceed with surgical intervention.     The patient understands that the risks of surgery include, but are not limited to: infection, bleeding, injury to nearby structures (such as nerves, blood vessels, and tendons), nonunion, malunion, hardware failure/irritation or breakage, need for additional surgery, pain, stiffness, scarring, need for rehabilitation, blood clots, pulmonary embolism, stroke, arrhythmia, myocardial infarction, death, and anesthetic complications.  Patient expressed understanding and elected to proceed with surgery. All questions were answered to her satisfaction.     Consent was obtained for the procedure.     DESCRIPTION OF PROCEDURE IN DETAIL:  The patient was seen in the preoperative care unit. Patient identity, consent, procedure to be performed, and operative site were verified with the patient. The right upper extremity was marked.     The patient was brought to the operating room and placed supine on the operating room table. The right upper extremity was placed on an armboard. SCDs were placed on the bilateral lower extremities. A well-padded tourniquet was placed on the forearm.     Monitored sedation was administered by the anesthesia team. She did receive a preoperative regional block.     The right upper extremity were prepped and draped in the usual sterile fashion. A timeout was performed confirming the correct patient, procedure, operative site, and antibiotics. All were in agreement.     We began by making a standard volar carpal tunnel incision site from the distal wrist crease approximately 2.5 cm.  Blunt dissection of the subcutaneous tissues was carried down to the palmar fascia.  Hemostasis  was achieved with bipolar cautery.  The palmar fascia fibers were split.  Full exposure of the transverse carpal ligament was obtained.  The transverse carpal ligament was incised sharply with a scalpel.  This was completed distally with tenotomy scissors.  Care was taken to protect the superficial arch within the palmar fat.  Likewise the antebrachial fascia proximally was also completed with scissors.  A full release of the transverse carpal ligament was performed.  Excellent excursion of the median nerve was noted at the end of the procedure.  It was noted that the median nerve was significantly compressed with an hourglass depression.  The recurrent motor branch was identified and noted to be extra ligamentous and radial.  The wound was copiously irrigated.  The skin was then closed with 4-0 Monocryl interrupted buried sutures.    We then turned our attention to the dorsal wrist.  We made an incision through the the distal aspect of the old dorsal scar, approximately 5 cm in length.  Significant amount of scar and tenosynovitis was encountered directly subcutaneously.  This had enveloped the extensor tendons completely and was debrided.  We did take care to preserve what appeared to be the extensor retinaculum and elevated this in an ulnar based flap.  After the tenosynovectomy, we examined the extensor tendons.  All were intact, but the EIP showed evidence of attrition at the midsubstance. Over 90% of it remained intact.  This point is down to the dorsal capsule.  This was quite patulous likely due to attrition from the dorsal fusion mass and osteophyte and loose body.  We split this longitudinally exposing the radiocarpal and midcarpal joints.  A loose screw was resting directly in the radiocarpal joint and had caused significant wear of the radial articular cartilage. At this point we noted significant gross motion across the midcarpal joint. The fusion cup had completely fractured across and the proximal  hardware had fragmented. We obtained fluoroscopic images to confirm failure of fusion. There were several loose screws toggling in the capitate and hamate. There was a significant amount of fibrous debris and metallosis in the midcarpal joint. This was rongeured and sent for culture. Given the broken and loose hardware and apparent failure of the fusion, we decided it was in the patient's best interest to have it removed. We removed all the screws and fractured pieces of the fusion cup intact. The broken hardware in the distal capitate was left in place. In total, 3 pieces of the fusion cup and 7 screws were removed. The fibrous debris was rongeured. Evaluation of the carpus demonstrated no bridging bone or fusion across the midcarpal joint. The lunate and triquetrum did appear to be fused. There was fibrous scar across the capitate and hamate, but gross motion was also seen here at this articulation.     The wound was copiously irrigated. The dorsal capsule was tightened and imbricated with 3-0 ethibond in running locking fashion. The extensor retinaculum was likewise repaired over the extensor tendons in running locking fashion. The deep dermal tissues were closed with 3-0 vicryl and the skin was closed with running subcuticular 4-0 monocryl.    A sterile dressing was placed and the wrist immobilized in a volar resting short arm splint.    All needle and sponge counts were correct at the end of the procedure. There were no complications.     The patient was awoken from anesthesia and taken to the postoperative recovery unit in stable condition.     I was present and scrubbed for the entire procedure.     POSTOPERATIVE PLAN:  The patient will be discharged home. She was prescribed 15 tabs of oxycodone 5 mg q6h prn pain. I discussed the operating findings with the patient and informed her that bracing at this point would be her best option. If her pain persists, the next surgical option would be a total wrist  fusion. The patient voiced understanding and agreement. She will return in 11 days for splint removal, wound check, and X-rays out of plaster.    Rolan Conley MD     Hand, Upper Extremity & Microvascular Surgery  Department of Orthopedic Surgery  [facility]

## 2021-12-02 NOTE — PROGRESS NOTES
SUBJECTIVE:  Nadira returns for followup of L5- S1 Lumbar fusion surgery. In the interval of time Nadira is doing well.   She is experiencing intermittent muscle spasm, soreness None, and states weakness is Absent with None trauma since last seen by us.    ROS: Musculoskeletal and general review of systems are negative, per review of previous clinic questionnaire.  MUSCULOSKELETAL    EXAM:   Wounds: Mobility appropriate, no erythema, no drainage, wounds healing well, excellent mobility  Swelling:Present and Absent  Range of motion appropriate for this recovery time. With 10lbs  restrictions.  Wound erythema: None  Lumbar    PLAN OF CARE: Questions solicited and answered    IMPRESSION: 2 week post op. Suture removal. Incision is dry and clean and intact. No dehiscence.     RECOMMENDATIONS: I discussed the following treatment options: Follow up with Dr. Ramirez at the 6th week dede for post op appt.

## 2021-12-02 NOTE — BRIEF OP NOTE
Orthopaedic Surgery Brief Op-Note      Patient: Nadira Perez  : 1950  Date of Service: 2021 8:32 AM    Pre-operative Diagnosis: Right carpal tunnel syndrome [G56.01]  Post-operative Diagnosis: same    Procedure(s) Performed: Procedure(s):  RIGHT CARPAL TUNNEL RELEASE, RIGHT WRIST HARDWARE REMOVAL, RIGHT CARPAL BOSS EXCISION    Staff: Rolan Conley MD  Assistants: Fidel Cassidy MD PGY-4    Anesthesia: General  EBL: 3 ml  Tourniquet Time: 111 minutes at 250 mmHg    Implants:   Implant Name Type Inv. Item Serial No.  Lot No. LRB No. Used Action   TRI MED 2.4 LOCKING SCREW      Left 1 Explanted   TRI MED 2.4 LOCKING SCREW      Left 2 Explanted   TRI MED 2.4 LOCKING SCREW      Left 3 Explanted   One partial plate and screw      Right 2 Explanted     Drains: none  Intra-op Labs/Cxs/Specimens: Anaerobic + Aerobic specimen from right wrist  Complications: No apparent complications during procedure  Findings: Please see dictated operative note for details    Disposition: Stable to PACU, then discharge to home    POST OPERATIVE PLAN    Assessment/Plan: Nadira Perez is a s/p right CTR, excision of carpal boss and removal of wrist hardware on 2021 with Dr. Conley    Activity: Up ad akosua  Weight bearing status: NWB RUE  Antibiotics: Ancef harriett-op  DVT prophylaxis: Mechanical   Wound Care: Splint to remain clean, dry and intact until follow up  Cultures: Monitor culture results  Pain management: Orals PRN  Disposition: Anticipate discharge to home    Future Appointments   Date Time Provider Department Center   2021  7:00 AM Jovon Zavaleta [facility]   2021  9:00 AM Evie Lancaster PTA [facility]   2021  1:30 PM Rolan Conley MD [facility]   2021  3:30 PM Serge Ramirez MD [facility]   12/15/2021  8:30 AM Jennifer Jauregui, PT [facility]   2021  9:00 AM Willis So RPH [facility]   2021  9:40 AM Evie Lancaster PTA [facility]    1/4/2022 11:45 AM Serge Ramirez [facility]   1/17/2022 11:30 AM [id number]   1/19/2022  1:30 PM Felix Carlos MD [facility]   11/17/2022 11:00 AM Ashley Perry PA-C [facility]       Fidel Cassidy MD  Orthopaedic Surgery, PGY4

## 2021-12-06 LAB — BACTERIA TISS BX CULT: ABNORMAL

## 2021-12-07 ENCOUNTER — MYC REFILL (OUTPATIENT)
Dept: INTERNAL MEDICINE | Facility: CLINIC | Age: 71
End: 2021-12-07
Payer: COMMERCIAL

## 2021-12-07 ENCOUNTER — DOCUMENTATION ONLY (OUTPATIENT)
Dept: INFECTIOUS DISEASES | Facility: CLINIC | Age: 71
End: 2021-12-07
Payer: COMMERCIAL

## 2021-12-07 DIAGNOSIS — M48.062 SPINAL STENOSIS OF LUMBAR REGION WITH NEUROGENIC CLAUDICATION: ICD-10-CM

## 2021-12-07 DIAGNOSIS — M19.131 POST-TRAUMATIC OSTEOARTHRITIS OF RIGHT WRIST: Primary | ICD-10-CM

## 2021-12-07 RX ORDER — METHOCARBAMOL 500 MG/1
1000 TABLET, FILM COATED ORAL 4 TIMES DAILY PRN
Qty: 40 TABLET | Refills: 0 | Status: SHIPPED | OUTPATIENT
Start: 2021-12-07 | End: 2021-12-17

## 2021-12-07 RX ORDER — HYDROXYZINE HYDROCHLORIDE 10 MG/1
10 TABLET, FILM COATED ORAL 2 TIMES DAILY PRN
Qty: 30 TABLET | Refills: 0 | Status: SHIPPED | OUTPATIENT
Start: 2021-12-07 | End: 2022-07-16

## 2021-12-08 ENCOUNTER — PRE VISIT (OUTPATIENT)
Dept: INFECTIOUS DISEASES | Facility: CLINIC | Age: 71
End: 2021-12-08
Payer: COMMERCIAL

## 2021-12-08 ENCOUNTER — OFFICE VISIT (OUTPATIENT)
Dept: INFECTIOUS DISEASES | Facility: CLINIC | Age: 71
End: 2021-12-08
Attending: INTERNAL MEDICINE
Payer: OTHER MISCELLANEOUS

## 2021-12-08 ENCOUNTER — TELEPHONE (OUTPATIENT)
Dept: ORTHOPEDICS | Facility: CLINIC | Age: 71
End: 2021-12-08
Payer: COMMERCIAL

## 2021-12-08 ENCOUNTER — LAB (OUTPATIENT)
Dept: LAB | Facility: CLINIC | Age: 71
End: 2021-12-08
Payer: COMMERCIAL

## 2021-12-08 VITALS
HEIGHT: 67 IN | DIASTOLIC BLOOD PRESSURE: 89 MMHG | OXYGEN SATURATION: 98 % | BODY MASS INDEX: 26.15 KG/M2 | WEIGHT: 166.6 LBS | SYSTOLIC BLOOD PRESSURE: 149 MMHG | TEMPERATURE: 99 F | HEART RATE: 87 BPM

## 2021-12-08 DIAGNOSIS — Z51.81 THERAPEUTIC DRUG MONITORING: ICD-10-CM

## 2021-12-08 DIAGNOSIS — M19.131 POST-TRAUMATIC OSTEOARTHRITIS OF RIGHT WRIST: Primary | ICD-10-CM

## 2021-12-08 DIAGNOSIS — Z98.890 STATUS POST CARPAL TUNNEL RELEASE: ICD-10-CM

## 2021-12-08 DIAGNOSIS — Z22.321: ICD-10-CM

## 2021-12-08 DIAGNOSIS — M19.131 POST-TRAUMATIC OSTEOARTHRITIS OF RIGHT WRIST: ICD-10-CM

## 2021-12-08 LAB
ANION GAP SERPL CALCULATED.3IONS-SCNC: 8 MMOL/L (ref 3–14)
BUN SERPL-MCNC: 16 MG/DL (ref 7–30)
CALCIUM SERPL-MCNC: 9.2 MG/DL (ref 8.5–10.1)
CHLORIDE BLD-SCNC: 107 MMOL/L (ref 94–109)
CO2 SERPL-SCNC: 28 MMOL/L (ref 20–32)
CREAT SERPL-MCNC: 0.6 MG/DL (ref 0.52–1.04)
CRP SERPL-MCNC: <2.9 MG/L (ref 0–8)
ERYTHROCYTE [SEDIMENTATION RATE] IN BLOOD BY WESTERGREN METHOD: 31 MM/HR (ref 0–30)
GFR SERPL CREATININE-BSD FRML MDRD: >90 ML/MIN/1.73M2
GLUCOSE BLD-MCNC: 96 MG/DL (ref 70–99)
MAGNESIUM SERPL-MCNC: 2.7 MG/DL (ref 1.6–2.3)
POTASSIUM BLD-SCNC: 3.4 MMOL/L (ref 3.4–5.3)
SODIUM SERPL-SCNC: 143 MMOL/L (ref 133–144)

## 2021-12-08 PROCEDURE — 36415 COLL VENOUS BLD VENIPUNCTURE: CPT | Performed by: PATHOLOGY

## 2021-12-08 PROCEDURE — 80048 BASIC METABOLIC PNL TOTAL CA: CPT | Performed by: PATHOLOGY

## 2021-12-08 PROCEDURE — G0463 HOSPITAL OUTPT CLINIC VISIT: HCPCS

## 2021-12-08 PROCEDURE — 99203 OFFICE O/P NEW LOW 30 MIN: CPT | Mod: GC | Performed by: STUDENT IN AN ORGANIZED HEALTH CARE EDUCATION/TRAINING PROGRAM

## 2021-12-08 PROCEDURE — 86140 C-REACTIVE PROTEIN: CPT | Performed by: PATHOLOGY

## 2021-12-08 PROCEDURE — 83735 ASSAY OF MAGNESIUM: CPT | Performed by: PATHOLOGY

## 2021-12-08 PROCEDURE — 85652 RBC SED RATE AUTOMATED: CPT | Performed by: PATHOLOGY

## 2021-12-08 ASSESSMENT — ENCOUNTER SYMPTOMS
HOT FLASHES: 0
WEIGHT LOSS: 0
PARALYSIS: 0
NUMBNESS: 1
HEMOPTYSIS: 0
CHILLS: 0
NECK MASS: 0
SORE THROAT: 0
POLYPHAGIA: 0
ORTHOPNEA: 0
WEIGHT GAIN: 0
MYALGIAS: 1
HALLUCINATIONS: 0
SLEEP DISTURBANCES DUE TO BREATHING: 0
TASTE DISTURBANCE: 0
TREMORS: 1
STIFFNESS: 1
HYPOTENSION: 0
DYSPNEA ON EXERTION: 0
DIARRHEA: 0
LEG PAIN: 1
VOMITING: 0
DISTURBANCES IN COORDINATION: 1
JAUNDICE: 0
RECTAL PAIN: 0
FEVER: 0
DECREASED LIBIDO: 1
SMELL DISTURBANCE: 0
FLANK PAIN: 0
SNORES LOUDLY: 1
TROUBLE SWALLOWING: 1
PALPITATIONS: 1
DIZZINESS: 1
ALTERED TEMPERATURE REGULATION: 1
BLOOD IN STOOL: 0
CONSTIPATION: 1
HEMATURIA: 0
MUSCLE CRAMPS: 0
POSTURAL DYSPNEA: 0
HEARTBURN: 0
EYE PAIN: 0
EYE WATERING: 0
ARTHRALGIAS: 1
WEAKNESS: 1
MUSCLE WEAKNESS: 1
MEMORY LOSS: 0
INCREASED ENERGY: 1
EXERCISE INTOLERANCE: 1
ABDOMINAL PAIN: 1
BOWEL INCONTINENCE: 0
FATIGUE: 1
COUGH DISTURBING SLEEP: 0
SPUTUM PRODUCTION: 0
HYPERTENSION: 1
SINUS CONGESTION: 0
BACK PAIN: 1
DECREASED APPETITE: 0
NIGHT SWEATS: 0
WHEEZING: 0
COUGH: 0
POLYDIPSIA: 0
BLOATING: 1
LIGHT-HEADEDNESS: 1
SINUS PAIN: 0
DIFFICULTY URINATING: 1
HEADACHES: 0
DOUBLE VISION: 0
DYSURIA: 0
SPEECH CHANGE: 0
SYNCOPE: 0
TINGLING: 1
JOINT SWELLING: 0
NECK PAIN: 1
EYE IRRITATION: 0
HOARSE VOICE: 0
NAUSEA: 1
SHORTNESS OF BREATH: 0
SEIZURES: 0
EYE REDNESS: 0
LOSS OF CONSCIOUSNESS: 0

## 2021-12-08 ASSESSMENT — MIFFLIN-ST. JEOR: SCORE: 1313.44

## 2021-12-08 ASSESSMENT — PAIN SCALES - GENERAL: PAINLEVEL: MODERATE PAIN (5)

## 2021-12-08 NOTE — PROGRESS NOTES
Infectious Disease Curbside Note  I was called by Dr. Conley on 12/7/2021 at 4::08 PM to provide input for Nadira Perez MRN [id number]. The patient is located at [facility] [place]. The nature of this request for a curbside consultation does not permit me to perform a comprehensive review of health care records, patient/family interview, nor an examination of the patient. I obtained limited patient information from the provider on the phone call and a limited chart review.    Based on only the information I was provided today, I make the following recommendations to the treating provider/team for their review and consideration:   For MSSE in the tissue debridement after removing hardware due to lucency on imaging and abnormal tissue grossly in the OR.   1. CRP.   2. Refer to ID for management of possible hardware related MSSE infection.   3. If the patient can be seen by ID within 2 weeks, keep off of ABx. Otherwise give us a call back for further recommendations.      These recommendations are not intended to take the place of the care team's clinical judgement, which should always be utilized to provide the most appropriate care to meet the unique needs of each patient. The recommendations offered were based on the limited scope of information provided as today's date. Should additional guidance be needed or required a formal consultation with infectious diseases is recommended.    Dianna Jeong MD

## 2021-12-08 NOTE — NURSING NOTE
"Chief Complaint   Patient presents with     Consult     Infection in right wrist post surgery     Vital signs:  Temp: 99  F (37.2  C) Temp src: Oral BP: (!) 149/89 Pulse: 87     SpO2: 98 %     Height: 170.2 cm (5' 7.01\") Weight: 75.6 kg (166 lb 9.6 oz)  Estimated body mass index is 26.09 kg/m  as calculated from the following:    Height as of this encounter: 1.702 m (5' 7.01\").    Weight as of this encounter: 75.6 kg (166 lb 9.6 oz).      Carmela Kumar, [facility]  12/8/2021 2:08 PM      "

## 2021-12-08 NOTE — PATIENT INSTRUCTIONS
Good News! The wound looks great , you have no objective evidence of an infection other than the culture.    Staphylococcus epidermidis is one of the most common skin bacteria and as such tends to contaminate cultures, particularly the broth type of culture where it grew in your case. If you continue to heal and recover as anticipated then we believe this culture is no cause for treatment. If there are issues we can envision a scenario where we would treat you with a well tolerated oral antibiotic such as doxycycline for a period of 6 weeks to eradicate any infection. Please let us know if there are issues. Good luck your your recovery!    Clinic Phone: [phone]

## 2021-12-08 NOTE — LETTER
12/8/2021      RE: Nadira Perez  [id number]  [facility] [id number]       [facility]  Division of Infectious Diseases and International Medicine  Department of Medicine    GENERAL INFECTIOUS DISEASE OUTPATIENT VISIT NOTE     Patient:  Nadira Perez, Date of birth 1950, Medical record number [id number]  Date of Visit:  December 9, 2021  Referred by: Referred Self   Reason for Consult: Staph epidermidis in surgical tissue sample       History of Present Illness:     Nadira Perez is a wonderful 71 year old y.o with a PMH notable for hypertension, history of breast cancer, most notably a history of traumatic injury to bilateral wrists in 2004.    The patient states that this is in relation to a workplace injury which occurred in 2004.  She fell and injured her bilateral wrists.  This has affected her significantly as she is a critical care nurse and nurse educator as well.  She has had several surgeries on her wrists.  The initial surgeries were in the 2005 2006 range.  Per the patient and also review of notes the patient had some revision surgery in 2013.  This was complicated by an MRSA infection which was treated and has been quiescent.    Notably the patient has had ganglion cysts and has had bilateral wrist ganglion cyst injections due to this problem.  The most recent injection was approximately 6 weeks ago.  The patient has noted some wrist pain and swelling ongoing for the last several months and was noted to have a displaced screw in the wrist joint on x-rays done several months ago.    The patient had unrelated to her wrist back surgery last month and postoperatively noted typical carpal tunnel like symptoms in the first 3 digits of her right hand.  She was taken for surgery for carpal tunnel syndrome 12/2/2021.  The operative notes were reviewed 7 screws appeared to have been removed from the carpal region although perhaps some screws were left in a  more distant area.  There was some osteoarthrosis and fibrous tissue but no purulence noted in the operative report.  Due to the unhealthy looking tissue tissue cultures were sent    They present to us as a referral today regarding positive staph epidermidis in broth on day 2 of tissue culture.    The patient reports postoperatively she has been feeling well no fevers no chills is having some discomfort from the brace on her hand but otherwise no systemic symptoms.  In fact the patient had no fevers or chills or purulence in the joint prior to surgery but of course did have the typical carpal tunnel symptoms.  She has not had any other recent procedures other than the ganglion cyst injection.  She has not been on antibiotics for prolonged periods of times for other reasons.    The patient notes that otherwise she is feeling in her normal state of health.  She reports a childhood penicillin allergy but has tolerated cephalosporins and other antibiotics in the past.        Current Antimicrobials:     None        Microbiologic Data:     12/2 tissue culture oxacillin susceptible staph epidermidis       Review of Systems:     The following systems were reviewed with the patient as they pertain to the case and are negative unless noted here or above in the HPI. The patient was  able to participate in the following review of systems    REVIEW OF SYSTEMS:     Night sweats: No       Chills: No   Increased stress: No   Excessive hunger: No   Excessive thirst: No   Feeling hot or cold when others believe the temperature is normal: Yes   Loss of height: Yes   Post-operative complications: Yes   Surgical site pain: Yes   Hallucinations: No   Change in or Loss of Energy: Yes   Hyperactivity: No   Confusion: No   Ear pain: No     Ear discharge: No   Hearing loss: Yes   Tinnitus: No   Nosebleeds: No   Congestion: No   Sinus pain: No   Trouble swallowing: Yes   Voice hoarseness: No   Mouth sores: No   Sore throat: No      Neck lump:  No   Eye pain: No   Vision loss: No   Dry eyes: Yes   Watery eyes: No   Eye bulging: No   Double vision: No   Flashing of lights: No   Spots: Yes   Floaters: Yes   Redness: No   Crossed eyes: No   Tunnel Vision: No   Yellowing of eyes: No   Eye irritation: No   Cough: No   Sputum or phlegm: No   Coughing up blood: No   Difficulty breating or shortness of breath: No     Tooth pain: No   Gum tenderness: No   Bleeding gums: No   Change in taste: No   Change in sense of smell: No   Dry mouth: Yes   Hearing aid used: Yes             Other Medical History:     Attempt was made to collect past, family and social history during this encounter,  this information was reviewed with the patient and updated    Erythromycin and Penicillins    Past Medical History  Past Medical History:   Diagnosis Date     Arthritis      Bone disease      Breast cancer (H)      H/O kyphoplasty      Hearing problem      History of kidney stones      History of radiation therapy      Hyperlipemia      Hypertension      Hypopotassemia      Kidney problem      Lymph edema      Medullary sponge kidney      Osteopenia      PONV (postoperative nausea and vomiting)      Reduced vision      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     Thyroid disease     PastSurgical History  Past Surgical History:   Procedure Laterality Date     ABDOMEN SURGERY      ovarian cyst, mesh     ARTHRODESIS WRIST Right      ARTHRODESIS WRIST  02/14/2013    Procedure: ARTHRODESIS WRIST;  left wrist scaphoid excision, four bone fusion, iliac crest bone graft  ( Mac with block);  Surgeon: Av Mendez MD;  [facility]     BIOPSY      skin, vaginal     CATARACT IOL, RT/LT Right 03/13/2018     CATARACT IOL, RT/LT Left 02/20/2018     COLONOSCOPY  12/24/2013    Procedure: COMBINED COLONOSCOPY, SINGLE BIOPSY/POLYPECTOMY BY BIOPSY;  COLONOSCOPY;  Surgeon: Dom Alvarez MD;  Location:  GI     ESOPHAGOSCOPY, GASTROSCOPY, DUODENOSCOPY (EGD), COMBINED N/A  11/23/2016    Procedure: COMBINED ESOPHAGOSCOPY, GASTROSCOPY, DUODENOSCOPY (EGD);  Surgeon: Quinten Feliciano MD;  Location: [facility] GI     EXTERNAL EAR SURGERY      right     EYE SURGERY      radial keratomy     FUSION, SPINE, INTERBODY, OBLIQUE ANTERIOR AND LUMBAR, 2 LEVELS, POST APPROACH, USING OTS N/A 11/18/2021    Procedure: Part 1: Oblique Anterior Interbody Fusion at Lumbar 5 to sacral 1 with use of Bone Morphogenic Protein,;  Surgeon: Serge Ramirez MD;  Location: [facility]     GRAFT BONE FROM ILIAC CREST  02/14/2013    Procedure: GRAFT BONE FROM ILIAC CREST;  mac with block and local infilitration;  Surgeon: Av Mendez MD;  [facility]: [facility]     HC BREATH HYDROGEN TEST N/A 10/14/2016    Procedure: HYDROGEN BREATH TEST;  Surgeon: Cheri Barron MD;  Location: [facility] GI     HERNIA REPAIR      umbilical age 18 mos.     HYSTERECTOMY TOTAL ABDOMINAL  05/03/2000     MASTECTOMY MODIFIED RADICAL Bilateral     bilateral; right breast prophylactic     OPTICAL TRACKING SYSTEM FUSION POSTERIOR SPINE LUMBAR N/A 11/18/2021    Procedure: Open Posterior Instrumented Spinal Fusion at Lumbar 5 to Sacral 1, with grimm aguayo osteotomy, use of O-Arm/Stealth;  Surgeon: Serge Ramirez MD;  Location: [facility]     PARATHYROIDECTOMY  09/23/2004    R SUPERIOR     PARATHYROIDECTOMY  09/23/2004    parathyroid resection, subtotal     RELEASE CARPAL TUNNEL Right 12/2/2021    Procedure: RIGHT CARPAL TUNNEL RELEASE, RIGHT WRIST HARDWARE REMOVAL, RIGHT CARPAL BOSS EXCISION;  Surgeon: Rolan Conley MD;  Location: [facility] OR     REMOVE HARDWARE HAND  09/24/2013    Procedure: REMOVE HARDWARE HAND;  Left Hand Screw Removal        RHINOPLASTY  1968     thyr proc skin closed cosmetic manner by subcuticular stitch  01/23/2009     THYROPLASTY  10/09/2009     TONSILLECTOMY  1977     WRIST SURGERY      wrist arthrodesis      Family History  Family History   Problem Relation Age of Onset     Neurologic Disorder Mother          Anuerysm of Cerebral Artery, Dementia     Diabetes Mother      Thyroid Disease Mother         ,     Cerebrovascular Disease Mother      Dementia Mother      Osteoporosis Mother      Heart Disease Father         AAA     Hypertension Father      Circulatory Brother         Perihperal Neurophathy     Diabetes Maternal Grandmother      Asthma Maternal Grandmother      Chronic Obstructive Pulmonary Disease Maternal Grandfather         father     Asthma Maternal Grandfather      Diabetes Maternal Aunt         x2     Melanoma Maternal Aunt      Glaucoma Maternal Aunt      Breast Cancer Cousin      Dementia Other      Cancer Other         malignant melanoma     Hypertension Other      Hypertension Other      Cerebrovascular Disease Other      Cerebrovascular Disease Other      Obesity Other      Respiratory Other      Cancer Other      Diabetes Other      Asthma Other      Macular Degeneration No family hx of      Coronary Artery Disease No family hx of      Hyperlipidemia No family hx of      Kidney Disease No family hx of      Thrombosis No family hx of      Arthritis No family hx of      Depression No family hx of      Mental Illness No family hx of      Substance Abuse No family hx of      Cystic Fibrosis No family hx of      Early Death No family hx of      Coronary Artery Disease Early Onset No family hx of      Heart Failure No family hx of      Bleeding Diathesis No family hx of      Ovarian Cancer No family hx of      Uterine Cancer No family hx of      Prostate Cancer No family hx of      Colorectal Cancer No family hx of      Pancreatic Cancer No family hx of      Lung Cancer No family hx of      Other Cancer No family hx of      Autoimmune Disease No family hx of      Unknown/Adopted No family hx of      Genetic Disorder No family hx of      Bleeding Disorder No family hx of      Clotting Disorder No family hx of      Anesthesia Reaction No family hx of     Social History   reports that she has never  "smoked. She has never used smokeless tobacco. She reports previous alcohol use of about 7.0 standard drinks of alcohol per week. She reports current drug use. Drug: Marijuana.  She   Notable Exposures  Surgery , healthcare worker Vaccination History:  Immunization History   Administered Date(s) Administered     COVID-19,PF,Pfizer (12+ Yrs) 12/21/2020, 01/08/2021, 10/06/2021     Flu 65+ Years 08/14/2020     HEPA 04/26/2005, 10/26/2005     HepB 04/26/2005, 06/02/2005, 10/26/2005     Influenza (High Dose) 3 valent vaccine 10/06/2017, 10/08/2018, 10/01/2019, 08/14/2020     Influenza (IIV3) PF 10/01/2008, 09/29/2009, 10/01/2011, 10/02/2012, 10/09/2013, 10/01/2014     Influenza Intranasal Vaccine 4 valent (FluMist) 1950     Influenza Vaccine IM > 6 months Valent IIV4 (Alfuria,Fluzone) 10/13/2016     Influenza, Quad, High Dose, Pf, 65yr+ (Fluzone HD) 08/25/2021     Pneumo Conj 13-V (2010&after) 08/01/2017     Pneumococcal 23 valent 10/01/2008, 08/22/2018     TD (ADULT, 7+) 01/10/2000, 04/26/2005     TDAP Vaccine (Boostrix) 11/06/2012     Typhoid IM 04/26/2005     Zoster vaccine recombinant adjuvanted (SHINGRIX) 02/15/2018, 10/18/2018     Zoster vaccine, live 11/14/2012             Physical Exam:     VITAL SIGNS:  Blood pressure (!) 149/89, pulse 87, temperature 99  F (37.2  C), temperature source Oral, height 1.702 m (5' 7.01\"), weight 75.6 kg (166 lb 9.6 oz), SpO2 98 %, not currently breastfeeding.         GENERAL APPEARANCE: Not in acute distress    PHYSICAL EXAM:  Eyes:     extraocular eye movements grossly intact, no ptosis, no discharge, no scleral icterus  Mouth, Throat:     not examined and mask left on in absence of symtpoms durign COVID 19 pandemic  Cardiovascular:    Inspection: No Cyanosis, JVD not elevated     Respiratory:     Inspection: Not in respiratory distress, Chest expansion symmetrical  Gastrointestinal:      non distended, non pulsatile  Musculoskeletal:       Carefully removed dressings over " the right wrist while keeping hand in position indicated by splint.  Both anterior and posterior surgical sites looked pristine with no erythema or purulence.  Using sterile technique was inspected palpated and rebandaged with same materials as was originally bandaged.  Surgical plaster splint was reapplied in the same way.    Skin:     Dry and intact  Neurologic:     Higher Mental Function: Conversant, AOx4   Motor: ambulatory  Psychiatric:     appropriate         Assessment and Plan     # History of MRSA  # Carpal Tunnel Syndrome s/p repair  # History of traumatic injury to wrists  # Positive tissue culture  # Concern for septic arthritis    Impression: Likely contaminant, no clear predisposing factor, stable without treatment, exam reassuring, CRP normal    Plan  No treatment indicated at this time  Gave the following advice to the patient  If you continue to heal and recover as anticipated then we believe this culture is no cause for treatment. If there are issues we can envision a scenario where we would treat you with a well tolerated oral antibiotic such as doxycycline for a period of 6 weeks to eradicate any infection. Please let us know if there are issues.           Signature:     Jordin Rawls MD   PGY-4 Infectious Disease Fellow    This dictation was prepared in part using Dragon recognition software.  As a result errors may occur. When identified these transcription errors have been corrected.  While every attempt is made to correct errors during dictation, errors may still exist     [facility] Staff Note: Ms. Perez was seen, examined, and the case was discussed with Dr. Rawls, ID Fellow -- I agree with his consultative history and examination, assessment and plan in this outpatient ID Consult note. This note reflects my observations and opinions and the plan outlined fully reflects my approach. I have reviewed the available history, radiology, laboratory results, and reports with the  Fellow.      Jordin Rawls MD

## 2021-12-08 NOTE — TELEPHONE ENCOUNTER
Message from Dr Rolan Conley, stating he spoke with Infectious Disease.  Ismael's intraoperative culture grew out Staph Epi. Dr Conley discussed this infectious disease and they would like to see her urgently.   He placed an urgent referral. Patient needs appointment within the next 1-2 weeks.  Dr Conley also sent this patient a message via My Chart with results and instructions.    Contacted ID nurse Francisco via back line [facility] and informed her of this, and she will look at schedules and arrange appointment ASAP.    Christina Cox RN

## 2021-12-08 NOTE — TELEPHONE ENCOUNTER
RECORDS RECEIVED FROM: Internal   DATE RECEIVED: 12.08.2021    NOTES (Gather within 2 years) STATUS DETAILS   OFFICE NOTE from referring provider   Internal 12.07.2021 Rolan Conley MD   OFFICE NOTE from other specialist Internal 05.13.2021 Lien Prajapati MD     DISCHARGE SUMMARY from hospital N/A    DISCHARGE REPORT from the ER N/A    LABS (any labs) Internal    MEDICATION LIST Internal    IMAGING  (NEED IMAGES AND REPORTS)     Osteomyelitis: Foot imaging  N/A    Liver Abscess: Abdominal imaging N/A    Other (anything related to diagnoses N/A

## 2021-12-08 NOTE — Clinical Note
12/8/2021       RE: Nadira Perez  [id number]  [facility] [id number]     Dear Colleague,    Thank you for referring your patient, Nadira Perez, to the [facility] INFECTIOUS DISEASE CLINIC [place] at [facility]. Please see a copy of my visit note below.    General acute hospital  Division of Infectious Diseases and International Medicine  Department of Medicine    GENERAL INFECTIOUS DISEASE OUTPATIENT VISIT NOTE     Patient:  Nadira Perez, Date of birth 1950, Medical record number [id number]  Date of Visit:  December 9, 2021  Referred by: Referred Self   Reason for Consult: Staph epidermidis in surgical tissue sample       History of Present Illness:     Nadira Perez is a wonderful 71 year old y.o with a PMH notable for hypertension, history of breast cancer, most notably a history of traumatic injury to bilateral wrists in 2004.    The patient states that this is in relation to a workplace injury which occurred in 2004.  She fell and injured her bilateral wrists.  This has affected her significantly as she is a critical care nurse and nurse educator as well.  She has had several surgeries on her wrists.  The initial surgeries were in the 2005 2006 range.  Per the patient and also review of notes the patient had some revision surgery in 2013.  This was complicated by an MRSA infection which was treated and has been quiescent.    Notably the patient has had ganglion cysts and has had bilateral wrist ganglion cyst injections due to this problem.  The most recent injection was approximately 6 weeks ago.  The patient has noted some wrist pain and swelling ongoing for the last several months and was noted to have a displaced screw in the wrist joint on x-rays done several months ago.    The patient had unrelated to her wrist back surgery last month and postoperatively noted typical carpal tunnel like symptoms in  the first 3 digits of her right hand.  She was taken for surgery for carpal tunnel syndrome 12/2/2021.  The operative notes were reviewed 7 screws appeared to have been removed from the carpal region although perhaps some screws were left in a more distant area.  There was some osteoarthrosis and fibrous tissue but no purulence noted in the operative report.  Due to the unhealthy looking tissue tissue cultures were sent    They present to us as a referral today regarding positive staph epidermidis in broth on day 2 of tissue culture.    The patient reports postoperatively she has been feeling well no fevers no chills is having some discomfort from the brace on her hand but otherwise no systemic symptoms.  In fact the patient had no fevers or chills or purulence in the joint prior to surgery but of course did have the typical carpal tunnel symptoms.  She has not had any other recent procedures other than the ganglion cyst injection.  She has not been on antibiotics for prolonged periods of times for other reasons.    The patient notes that otherwise she is feeling in her normal state of health.  She reports a childhood penicillin allergy but has tolerated cephalosporins and other antibiotics in the past.        Current Antimicrobials:     None        Microbiologic Data:     12/2 tissue culture oxacillin susceptible staph epidermidis       Review of Systems:     The following systems were reviewed with the patient as they pertain to the case and are negative unless noted here or above in the HPI. The patient was  able to participate in the following review of systems    REVIEW OF SYSTEMS:     Night sweats: No       Chills: No   Increased stress: No   Excessive hunger: No   Excessive thirst: No   Feeling hot or cold when others believe the temperature is normal: Yes   Loss of height: Yes   Post-operative complications: Yes   Surgical site pain: Yes   Hallucinations: No   Change in or Loss of Energy: Yes    Hyperactivity: No   Confusion: No   Ear pain: No     Ear discharge: No   Hearing loss: Yes   Tinnitus: No   Nosebleeds: No   Congestion: No   Sinus pain: No   Trouble swallowing: Yes   Voice hoarseness: No   Mouth sores: No   Sore throat: No      Neck lump: No   Eye pain: No   Vision loss: No   Dry eyes: Yes   Watery eyes: No   Eye bulging: No   Double vision: No   Flashing of lights: No   Spots: Yes   Floaters: Yes   Redness: No   Crossed eyes: No   Tunnel Vision: No   Yellowing of eyes: No   Eye irritation: No   Cough: No   Sputum or phlegm: No   Coughing up blood: No   Difficulty breating or shortness of breath: No     Tooth pain: No   Gum tenderness: No   Bleeding gums: No   Change in taste: No   Change in sense of smell: No   Dry mouth: Yes   Hearing aid used: Yes             Other Medical History:     Attempt was made to collect past, family and social history during this encounter,  this information was reviewed with the patient and updated    Erythromycin and Penicillins    Past Medical History  Past Medical History:   Diagnosis Date     Arthritis      Bone disease      Breast cancer (H)      H/O kyphoplasty      Hearing problem      History of kidney stones      History of radiation therapy      Hyperlipemia      Hypertension      Hypopotassemia      Kidney problem      Lymph edema      Medullary sponge kidney      Osteopenia      PONV (postoperative nausea and vomiting)      Reduced vision      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     Thyroid disease     PastSurgical History  Past Surgical History:   Procedure Laterality Date     ABDOMEN SURGERY      ovarian cyst, mesh     ARTHRODESIS WRIST Right      ARTHRODESIS WRIST  02/14/2013    Procedure: ARTHRODESIS WRIST;  left wrist scaphoid excision, four bone fusion, iliac crest bone graft  ( Mac with block);  Surgeon: Av Mendez MD;  [facility]     BIOPSY      skin, vaginal     CATARACT IOL, RT/LT Right 03/13/2018      CATARACT IOL, RT/LT Left 02/20/2018     COLONOSCOPY  12/24/2013    Procedure: COMBINED COLONOSCOPY, SINGLE BIOPSY/POLYPECTOMY BY BIOPSY;  COLONOSCOPY;  Surgeon: Dom Alvarez MD;  Location:  GI     ESOPHAGOSCOPY, GASTROSCOPY, DUODENOSCOPY (EGD), COMBINED N/A 11/23/2016    Procedure: COMBINED ESOPHAGOSCOPY, GASTROSCOPY, DUODENOSCOPY (EGD);  Surgeon: Quinten Feliciano MD;  Location:  GI     EXTERNAL EAR SURGERY      right     EYE SURGERY      radial keratomy     FUSION, SPINE, INTERBODY, OBLIQUE ANTERIOR AND LUMBAR, 2 LEVELS, POST APPROACH, USING OTS N/A 11/18/2021    Procedure: Part 1: Oblique Anterior Interbody Fusion at Lumbar 5 to sacral 1 with use of Bone Morphogenic Protein,;  Surgeon: Serge Ramirez MD;  Location: [facility]     GRAFT BONE FROM ILIAC CREST  02/14/2013    Procedure: GRAFT BONE FROM ILIAC CREST;  mac with block and local infilitration;  Surgeon: Av Mendez MD;  [facility]      BREATH HYDROGEN TEST N/A 10/14/2016    Procedure: HYDROGEN BREATH TEST;  Surgeon: Cheri Barron MD;  Location:  GI     HERNIA REPAIR      umbilical age 18 mos.     HYSTERECTOMY TOTAL ABDOMINAL  05/03/2000     MASTECTOMY MODIFIED RADICAL Bilateral     bilateral; right breast prophylactic     OPTICAL TRACKING SYSTEM FUSION POSTERIOR SPINE LUMBAR N/A 11/18/2021    Procedure: Open Posterior Instrumented Spinal Fusion at Lumbar 5 to Sacral 1, with grimm aguayo osteotomy, use of O-Arm/Stealth;  Surgeon: Serge Ramirez MD;  Location: [facility]     PARATHYROIDECTOMY  09/23/2004    R SUPERIOR     PARATHYROIDECTOMY  09/23/2004    parathyroid resection, subtotal     RELEASE CARPAL TUNNEL Right 12/2/2021    Procedure: RIGHT CARPAL TUNNEL RELEASE, RIGHT WRIST HARDWARE REMOVAL, RIGHT CARPAL BOSS EXCISION;  Surgeon: Rolan Conley MD;  Location: [facility] OR     REMOVE HARDWARE HAND  09/24/2013    Procedure: REMOVE HARDWARE HAND;  Left Hand Screw Removal        RHINOPLASTY  1968      thyr proc skin closed cosmetic manner by subcuticular stitch  01/23/2009     THYROPLASTY  10/09/2009     TONSILLECTOMY  1977     WRIST SURGERY      wrist arthrodesis      Family History  Family History   Problem Relation Age of Onset     Neurologic Disorder Mother         Anuerysm of Cerebral Artery, Dementia     Diabetes Mother      Thyroid Disease Mother         ,     Cerebrovascular Disease Mother      Dementia Mother      Osteoporosis Mother      Heart Disease Father         AAA     Hypertension Father      Circulatory Brother         Perihperal Neurophathy     Diabetes Maternal Grandmother      Asthma Maternal Grandmother      Chronic Obstructive Pulmonary Disease Maternal Grandfather         father     Asthma Maternal Grandfather      Diabetes Maternal Aunt         x2     Melanoma Maternal Aunt      Glaucoma Maternal Aunt      Breast Cancer Cousin      Dementia Other      Cancer Other         malignant melanoma     Hypertension Other      Hypertension Other      Cerebrovascular Disease Other      Cerebrovascular Disease Other      Obesity Other      Respiratory Other      Cancer Other      Diabetes Other      Asthma Other      Macular Degeneration No family hx of      Coronary Artery Disease No family hx of      Hyperlipidemia No family hx of      Kidney Disease No family hx of      Thrombosis No family hx of      Arthritis No family hx of      Depression No family hx of      Mental Illness No family hx of      Substance Abuse No family hx of      Cystic Fibrosis No family hx of      Early Death No family hx of      Coronary Artery Disease Early Onset No family hx of      Heart Failure No family hx of      Bleeding Diathesis No family hx of      Ovarian Cancer No family hx of      Uterine Cancer No family hx of      Prostate Cancer No family hx of      Colorectal Cancer No family hx of      Pancreatic Cancer No family hx of      Lung Cancer No family hx of      Other Cancer No family hx of      Autoimmune  "Disease No family hx of      Unknown/Adopted No family hx of      Genetic Disorder No family hx of      Bleeding Disorder No family hx of      Clotting Disorder No family hx of      Anesthesia Reaction No family hx of     Social History   reports that she has never smoked. She has never used smokeless tobacco. She reports previous alcohol use of about 7.0 standard drinks of alcohol per week. She reports current drug use. Drug: Marijuana.  She   Notable Exposures  Surgery , healthcare worker Vaccination History:  Immunization History   Administered Date(s) Administered     COVID-19,PF,Pfizer (12+ Yrs) 12/21/2020, 01/08/2021, 10/06/2021     Flu 65+ Years 08/14/2020     HEPA 04/26/2005, 10/26/2005     HepB 04/26/2005, 06/02/2005, 10/26/2005     Influenza (High Dose) 3 valent vaccine 10/06/2017, 10/08/2018, 10/01/2019, 08/14/2020     Influenza (IIV3) PF 10/01/2008, 09/29/2009, 10/01/2011, 10/02/2012, 10/09/2013, 10/01/2014     Influenza Intranasal Vaccine 4 valent (FluMist) 1950     Influenza Vaccine IM > 6 months Valent IIV4 (Alfuria,Fluzone) 10/13/2016     Influenza, Quad, High Dose, Pf, 65yr+ (Fluzone HD) 08/25/2021     Pneumo Conj 13-V (2010&after) 08/01/2017     Pneumococcal 23 valent 10/01/2008, 08/22/2018     TD (ADULT, 7+) 01/10/2000, 04/26/2005     TDAP Vaccine (Boostrix) 11/06/2012     Typhoid IM 04/26/2005     Zoster vaccine recombinant adjuvanted (SHINGRIX) 02/15/2018, 10/18/2018     Zoster vaccine, live 11/14/2012             Physical Exam:     VITAL SIGNS:  Blood pressure (!) 149/89, pulse 87, temperature 99  F (37.2  C), temperature source Oral, height 1.702 m (5' 7.01\"), weight 75.6 kg (166 lb 9.6 oz), SpO2 98 %, not currently breastfeeding.         GENERAL APPEARANCE: Not in acute distress    PHYSICAL EXAM:  Eyes:     extraocular eye movements grossly intact, no ptosis, no discharge, no scleral icterus  Mouth, Throat:     not examined and mask left on in absence of symtpoms antonio COVID 19 " pandemic  Cardiovascular:    Inspection: No Cyanosis, JVD not elevated     Respiratory:     Inspection: Not in respiratory distress, Chest expansion symmetrical  Gastrointestinal:      non distended, non pulsatile  Musculoskeletal:       Carefully removed dressings over the right wrist while keeping hand in position indicated by splint.  Both anterior and posterior surgical sites looked pristine with no erythema or purulence.  Using sterile technique was inspected palpated and rebandaged with same materials as was originally bandaged.  Surgical plaster splint was reapplied in the same way.    Skin:     Dry and intact  Neurologic:     Higher Mental Function: Conversant, AOx4   Motor: ambulatory  Psychiatric:     appropriate         Assessment and Plan     # History of MRSA  # Carpal Tunnel Syndrome s/p repair  # History of traumatic injury to wrists  # Positive tissue culture  # Concern for septic arthritis    Impression: Likely contaminant, no clear predisposing factor, stable without treatment, exam reassuring, CRP normal    Plan  No treatment indicated at this time  Gave the following advice to the patient  If you continue to heal and recover as anticipated then we believe this culture is no cause for treatment. If there are issues we can envision a scenario where we would treat you with a well tolerated oral antibiotic such as doxycycline for a period of 6 weeks to eradicate any infection. Please let us know if there are issues.           Signature:     Jordin Rawls MD   PGY-4 Infectious Disease Fellow    This dictation was prepared in part using Dragon recognition software.  As a result errors may occur. When identified these transcription errors have been corrected.  While every attempt is made to correct errors during dictation, errors may still exist         Again, thank you for allowing me to participate in the care of your patient.      Sincerely,    Jordin Rawls MD

## 2021-12-08 NOTE — TELEPHONE ENCOUNTER
Per [place] in ID, she informed that they had a cancellation today and patient was contacted, will see patient today. Christina Cox RN

## 2021-12-09 LAB — BACTERIA TISS BX CULT: NORMAL

## 2021-12-09 NOTE — PROGRESS NOTES
[facility]  Division of Infectious Diseases and International Medicine  Department of Medicine    GENERAL INFECTIOUS DISEASE OUTPATIENT VISIT NOTE     Patient:  Nadira Perez, Date of birth 1950, Medical record number [id number]  Date of Visit:  December 9, 2021  Referred by: Referred Self   Reason for Consult: Staph epidermidis in surgical tissue sample       History of Present Illness:     Nadira Perez is a wonderful 71 year old y.o with a PMH notable for hypertension, history of breast cancer, most notably a history of traumatic injury to bilateral wrists in 2004.    The patient states that this is in relation to a workplace injury which occurred in 2004.  She fell and injured her bilateral wrists.  This has affected her significantly as she is a critical care nurse and nurse educator as well.  She has had several surgeries on her wrists.  The initial surgeries were in the 2005 2006 range.  Per the patient and also review of notes the patient had some revision surgery in 2013.  This was complicated by an MRSA infection which was treated and has been quiescent.    Notably the patient has had ganglion cysts and has had bilateral wrist ganglion cyst injections due to this problem.  The most recent injection was approximately 6 weeks ago.  The patient has noted some wrist pain and swelling ongoing for the last several months and was noted to have a displaced screw in the wrist joint on x-rays done several months ago.    The patient had unrelated to her wrist back surgery last month and postoperatively noted typical carpal tunnel like symptoms in the first 3 digits of her right hand.  She was taken for surgery for carpal tunnel syndrome 12/2/2021.  The operative notes were reviewed 7 screws appeared to have been removed from the carpal region although perhaps some screws were left in a more distant area.  There was some osteoarthrosis and fibrous tissue but no purulence  noted in the operative report.  Due to the unhealthy looking tissue tissue cultures were sent    They present to us as a referral today regarding positive staph epidermidis in broth on day 2 of tissue culture.    The patient reports postoperatively she has been feeling well no fevers no chills is having some discomfort from the brace on her hand but otherwise no systemic symptoms.  In fact the patient had no fevers or chills or purulence in the joint prior to surgery but of course did have the typical carpal tunnel symptoms.  She has not had any other recent procedures other than the ganglion cyst injection.  She has not been on antibiotics for prolonged periods of times for other reasons.    The patient notes that otherwise she is feeling in her normal state of health.  She reports a childhood penicillin allergy but has tolerated cephalosporins and other antibiotics in the past.        Current Antimicrobials:     None        Microbiologic Data:     12/2 tissue culture oxacillin susceptible staph epidermidis       Review of Systems:     The following systems were reviewed with the patient as they pertain to the case and are negative unless noted here or above in the HPI. The patient was  able to participate in the following review of systems    REVIEW OF SYSTEMS:     Night sweats: No       Chills: No   Increased stress: No   Excessive hunger: No   Excessive thirst: No   Feeling hot or cold when others believe the temperature is normal: Yes   Loss of height: Yes   Post-operative complications: Yes   Surgical site pain: Yes   Hallucinations: No   Change in or Loss of Energy: Yes   Hyperactivity: No   Confusion: No   Ear pain: No     Ear discharge: No   Hearing loss: Yes   Tinnitus: No   Nosebleeds: No   Congestion: No   Sinus pain: No   Trouble swallowing: Yes   Voice hoarseness: No   Mouth sores: No   Sore throat: No      Neck lump: No   Eye pain: No   Vision loss: No   Dry eyes: Yes   Watery eyes: No   Eye bulging:  No   Double vision: No   Flashing of lights: No   Spots: Yes   Floaters: Yes   Redness: No   Crossed eyes: No   Tunnel Vision: No   Yellowing of eyes: No   Eye irritation: No   Cough: No   Sputum or phlegm: No   Coughing up blood: No   Difficulty breating or shortness of breath: No     Tooth pain: No   Gum tenderness: No   Bleeding gums: No   Change in taste: No   Change in sense of smell: No   Dry mouth: Yes   Hearing aid used: Yes             Other Medical History:     Attempt was made to collect past, family and social history during this encounter,  this information was reviewed with the patient and updated    Erythromycin and Penicillins    Past Medical History  Past Medical History:   Diagnosis Date     Arthritis      Bone disease      Breast cancer (H)      H/O kyphoplasty      Hearing problem      History of kidney stones      History of radiation therapy      Hyperlipemia      Hypertension      Hypopotassemia      Kidney problem      Lymph edema      Medullary sponge kidney      Osteopenia      PONV (postoperative nausea and vomiting)      Reduced vision      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     Thyroid disease     PastSurgical History  Past Surgical History:   Procedure Laterality Date     ABDOMEN SURGERY      ovarian cyst, mesh     ARTHRODESIS WRIST Right      ARTHRODESIS WRIST  02/14/2013    Procedure: ARTHRODESIS WRIST;  left wrist scaphoid excision, four bone fusion, iliac crest bone graft  ( Mac with block);  Surgeon: Av Mendez MD;  [facility]     BIOPSY      skin, vaginal     CATARACT IOL, RT/LT Right 03/13/2018     CATARACT IOL, RT/LT Left 02/20/2018     COLONOSCOPY  12/24/2013    Procedure: COMBINED COLONOSCOPY, SINGLE BIOPSY/POLYPECTOMY BY BIOPSY;  COLONOSCOPY;  Surgeon: Dom Alvarez MD;  Location:  GI     ESOPHAGOSCOPY, GASTROSCOPY, DUODENOSCOPY (EGD), COMBINED N/A 11/23/2016    Procedure: COMBINED ESOPHAGOSCOPY, GASTROSCOPY, DUODENOSCOPY (EGD);   Surgeon: Quinten Feliciano MD;  Location:  GI     EXTERNAL EAR SURGERY      right     EYE SURGERY      radial keratomy     FUSION, SPINE, INTERBODY, OBLIQUE ANTERIOR AND LUMBAR, 2 LEVELS, POST APPROACH, USING OTS N/A 11/18/2021    Procedure: Part 1: Oblique Anterior Interbody Fusion at Lumbar 5 to sacral 1 with use of Bone Morphogenic Protein,;  Surgeon: Serge Ramirez MD;  [facility]: [facility]     GRAFT BONE FROM ILIAC CREST  02/14/2013    Procedure: GRAFT BONE FROM ILIAC CREST;  mac with block and local infilitration;  Surgeon: Av Mendez MD;  [facility]     HC BREATH HYDROGEN TEST N/A 10/14/2016    Procedure: HYDROGEN BREATH TEST;  Surgeon: Cheri Barron MD;  Location:  GI     HERNIA REPAIR      umbilical age 18 mos.     HYSTERECTOMY TOTAL ABDOMINAL  05/03/2000     MASTECTOMY MODIFIED RADICAL Bilateral     bilateral; right breast prophylactic     OPTICAL TRACKING SYSTEM FUSION POSTERIOR SPINE LUMBAR N/A 11/18/2021    Procedure: Open Posterior Instrumented Spinal Fusion at Lumbar 5 to Sacral 1, with grimm aguayo osteotomy, use of O-Arm/Stealth;  Surgeon: Serge Ramirez MD;  [facility]: [facility]     PARATHYROIDECTOMY  09/23/2004    R SUPERIOR     PARATHYROIDECTOMY  09/23/2004    parathyroid resection, subtotal     RELEASE CARPAL TUNNEL Right 12/2/2021    Procedure: RIGHT CARPAL TUNNEL RELEASE, RIGHT WRIST HARDWARE REMOVAL, RIGHT CARPAL BOSS EXCISION;  Surgeon: Rolan Conley MD;  Location: [facility] OR     REMOVE HARDWARE HAND  09/24/2013    Procedure: REMOVE HARDWARE HAND;  Left Hand Screw Removal        RHINOPLASTY  1968     thyr proc skin closed cosmetic manner by subcuticular stitch  01/23/2009     THYROPLASTY  10/09/2009     TONSILLECTOMY  1977     WRIST SURGERY      wrist arthrodesis      Family History  Family History   Problem Relation Age of Onset     Neurologic Disorder Mother         Anuerysm of Cerebral Artery, Dementia     Diabetes Mother      Thyroid  Disease Mother         ,     Cerebrovascular Disease Mother      Dementia Mother      Osteoporosis Mother      Heart Disease Father         AAA     Hypertension Father      Circulatory Brother         Perihperal Neurophathy     Diabetes Maternal Grandmother      Asthma Maternal Grandmother      Chronic Obstructive Pulmonary Disease Maternal Grandfather         father     Asthma Maternal Grandfather      Diabetes Maternal Aunt         x2     Melanoma Maternal Aunt      Glaucoma Maternal Aunt      Breast Cancer Cousin      Dementia Other      Cancer Other         malignant melanoma     Hypertension Other      Hypertension Other      Cerebrovascular Disease Other      Cerebrovascular Disease Other      Obesity Other      Respiratory Other      Cancer Other      Diabetes Other      Asthma Other      Macular Degeneration No family hx of      Coronary Artery Disease No family hx of      Hyperlipidemia No family hx of      Kidney Disease No family hx of      Thrombosis No family hx of      Arthritis No family hx of      Depression No family hx of      Mental Illness No family hx of      Substance Abuse No family hx of      Cystic Fibrosis No family hx of      Early Death No family hx of      Coronary Artery Disease Early Onset No family hx of      Heart Failure No family hx of      Bleeding Diathesis No family hx of      Ovarian Cancer No family hx of      Uterine Cancer No family hx of      Prostate Cancer No family hx of      Colorectal Cancer No family hx of      Pancreatic Cancer No family hx of      Lung Cancer No family hx of      Other Cancer No family hx of      Autoimmune Disease No family hx of      Unknown/Adopted No family hx of      Genetic Disorder No family hx of      Bleeding Disorder No family hx of      Clotting Disorder No family hx of      Anesthesia Reaction No family hx of     Social History   reports that she has never smoked. She has never used smokeless tobacco. She reports previous alcohol use of  "about 7.0 standard drinks of alcohol per week. She reports current drug use. Drug: Marijuana.  She   Notable Exposures  Surgery , healthcare worker Vaccination History:  Immunization History   Administered Date(s) Administered     COVID-19,PF,Pfizer (12+ Yrs) 12/21/2020, 01/08/2021, 10/06/2021     Flu 65+ Years 08/14/2020     HEPA 04/26/2005, 10/26/2005     HepB 04/26/2005, 06/02/2005, 10/26/2005     Influenza (High Dose) 3 valent vaccine 10/06/2017, 10/08/2018, 10/01/2019, 08/14/2020     Influenza (IIV3) PF 10/01/2008, 09/29/2009, 10/01/2011, 10/02/2012, 10/09/2013, 10/01/2014     Influenza Intranasal Vaccine 4 valent (FluMist) 1950     Influenza Vaccine IM > 6 months Valent IIV4 (Alfuria,Fluzone) 10/13/2016     Influenza, Quad, High Dose, Pf, 65yr+ (Fluzone HD) 08/25/2021     Pneumo Conj 13-V (2010&after) 08/01/2017     Pneumococcal 23 valent 10/01/2008, 08/22/2018     TD (ADULT, 7+) 01/10/2000, 04/26/2005     TDAP Vaccine (Boostrix) 11/06/2012     Typhoid IM 04/26/2005     Zoster vaccine recombinant adjuvanted (SHINGRIX) 02/15/2018, 10/18/2018     Zoster vaccine, live 11/14/2012             Physical Exam:     VITAL SIGNS:  Blood pressure (!) 149/89, pulse 87, temperature 99  F (37.2  C), temperature source Oral, height 1.702 m (5' 7.01\"), weight 75.6 kg (166 lb 9.6 oz), SpO2 98 %, not currently breastfeeding.         GENERAL APPEARANCE: Not in acute distress    PHYSICAL EXAM:  Eyes:     extraocular eye movements grossly intact, no ptosis, no discharge, no scleral icterus  Mouth, Throat:     not examined and mask left on in absence of symtpoms durign COVID 19 pandemic  Cardiovascular:    Inspection: No Cyanosis, JVD not elevated     Respiratory:     Inspection: Not in respiratory distress, Chest expansion symmetrical  Gastrointestinal:      non distended, non pulsatile  Musculoskeletal:       Carefully removed dressings over the right wrist while keeping hand in position indicated by splint.  Both anterior " and posterior surgical sites looked pristine with no erythema or purulence.  Using sterile technique was inspected palpated and rebandaged with same materials as was originally bandaged.  Surgical plaster splint was reapplied in the same way.    Skin:     Dry and intact  Neurologic:     Higher Mental Function: Conversant, AOx4   Motor: ambulatory  Psychiatric:     appropriate         Assessment and Plan     # History of MRSA  # Carpal Tunnel Syndrome s/p repair  # History of traumatic injury to wrists  # Positive tissue culture  # Concern for septic arthritis    Impression: Likely contaminant, no clear predisposing factor, stable without treatment, exam reassuring, CRP normal    Plan  No treatment indicated at this time  Gave the following advice to the patient  If you continue to heal and recover as anticipated then we believe this culture is no cause for treatment. If there are issues we can envision a scenario where we would treat you with a well tolerated oral antibiotic such as doxycycline for a period of 6 weeks to eradicate any infection. Please let us know if there are issues.           Signature:     Jordin Rawls MD   PGY-4 Infectious Disease Fellow    This dictation was prepared in part using Dragon recognition software.  As a result errors may occur. When identified these transcription errors have been corrected.  While every attempt is made to correct errors during dictation, errors may still exist

## 2021-12-13 ENCOUNTER — OFFICE VISIT (OUTPATIENT)
Dept: ORTHOPEDICS | Facility: CLINIC | Age: 71
End: 2021-12-13
Payer: OTHER MISCELLANEOUS

## 2021-12-13 ENCOUNTER — ANCILLARY PROCEDURE (OUTPATIENT)
Dept: GENERAL RADIOLOGY | Facility: CLINIC | Age: 71
End: 2021-12-13
Attending: STUDENT IN AN ORGANIZED HEALTH CARE EDUCATION/TRAINING PROGRAM
Payer: COMMERCIAL

## 2021-12-13 DIAGNOSIS — M19.131 POST-TRAUMATIC OSTEOARTHRITIS OF RIGHT WRIST: Primary | ICD-10-CM

## 2021-12-13 DIAGNOSIS — M54.16 LUMBAR RADICULOPATHY: Primary | ICD-10-CM

## 2021-12-13 DIAGNOSIS — M25.561 RIGHT KNEE PAIN, UNSPECIFIED CHRONICITY: ICD-10-CM

## 2021-12-13 DIAGNOSIS — M19.131 POST-TRAUMATIC OSTEOARTHRITIS OF RIGHT WRIST: ICD-10-CM

## 2021-12-13 PROCEDURE — 73110 X-RAY EXAM OF WRIST: CPT | Mod: RT | Performed by: RADIOLOGY

## 2021-12-13 PROCEDURE — 99024 POSTOP FOLLOW-UP VISIT: CPT | Performed by: STUDENT IN AN ORGANIZED HEALTH CARE EDUCATION/TRAINING PROGRAM

## 2021-12-13 NOTE — NURSING NOTE
Reason For Visit:   Chief Complaint   Patient presents with     Surgical Followup     11 day pop DOS 12/2/21 Right Carpal Tunnel Release, Right Wrist Hardware Removal, Right Carpal Boss Excision       Primary MD: Matilde Quiñonez  Ref. MD: terrell     Age: 71 year old    ?  No      There were no vitals taken for this visit.      Pain Assessment  Patient Currently in Pain: Yes  0-10 Pain Scale: 4               QuickDASH Assessment  QuickDA Main 12/22/2016   1.Open a tight or new jar. Severe difficulty   2. Do heavy household chores (e.g., wash walls, floors) Moderate difficulty   3. Carry a shopping bag or briefcase. Moderate difficulty   4. Wash your back. Mild difficulty   5. Use a knife to cut food. Mild difficulty   6. Recreational activities in which you take some force or impact through your arm, shoulder or hand (e.g., golf, hammering, tennis, etc.). Unable   7. During the past week, to what extent has your arm, shoulder or hand problem interfered with your normal social activities with family, friends, neighbours or groups? Slightly   8. During the past week, were you limited in your work or other regular daily activities as a result of your arm, shoulder or hand problem? Slightly limited   9. Arm, shoulder or hand pain. Moderate   10.Tingling (pins and needles) in your arm,shoulder or hand. None   11. During the past week, how much difficulty have you had sleeping because of the pain in your arm, shoulder or hand? ([place] number) No difficulty   Quickdash Ability Score 38.63   1. Open a tight or new jar. -   2. Do heavy household chores (e.g., wash walls, floors). -   3. Carry a shopping bag or briefcase. -   4. Wash your back. -   5. Use a knife to cut food. -   6. Recreational activities in which you take some force or impact through your arm, shoulder or hand (e.g., golf, hammering, tennis, etc.). -   7. During the past week, to what extent has your arm, shoulder or hand problem interfered with  your normal social activities with family, friends, neighbours or groups? -   8. During the past week, were you limited in your work or other regular daily activities as a result of your arm, shoulder or hand problem? -   9. Arm, shoulder or hand pain. -   10. Tingling (pins and needles) in your arm,shoulder or hand. -   11. During the past week, how much difficulty have you had sleeping because of the pain in your arm, shoulder or hand? ([place] number) -   SUM: -          Current Outpatient Medications   Medication Sig Dispense Refill     acetaminophen (TYLENOL) 325 MG tablet Take 2 tablets (650 mg) by mouth every 4 hours as needed for other 60 tablet 0     amLODIPine (NORVASC) 10 MG tablet Take 1 tablet (10 mg) by mouth daily (Patient taking differently: Take 10 mg by mouth daily Takes around 10 AM.) 90 tablet 3     Ascorbic Acid (VITAMIN C) 500 MG CHEW Take 1 tablet by mouth daily       atorvastatin (LIPITOR) 80 MG tablet Take 1 tablet (80 mg) by mouth daily (Patient taking differently: Take 80 mg by mouth every evening ) 90 tablet 3     Cholecalciferol (VITAMIN D3) 50 MCG (2000 UT) CAPS Take 6,000 Units by mouth daily (Patient taking differently: Take 6,000 Units by mouth daily (with lunch) ) 270 capsule 3     CRANBERRY EXTRACT PO Take 650 mg by mouth daily ~10 am       diclofenac (VOLTAREN) 1 % topical gel Apply 4 grams to knees or 2 grams to hands four times daily using enclosed dosing card. (Patient taking differently: Apply 4 grams to knees or 2 grams to hands four times daily as needed using enclosed dosing card.) 100 g 1     gabapentin (NEURONTIN) 300 MG capsule Take 1-2 capsules by mouth every 8 hours (Patient taking differently: Take 600 mg by mouth 3 times daily ) 540 capsule 3     hydrOXYzine (ATARAX) 10 MG tablet Take 1 tablet (10 mg) by mouth 2 times daily as needed for itching (pain adjuvant) 30 tablet 0     ibuprofen (ADVIL/MOTRIN) 600 MG tablet Take 1 tablet (600 mg) by mouth every 6 hours as  needed for other (mild and/or inflammatory pain) 30 tablet 0     levothyroxine (SYNTHROID/LEVOTHROID) 112 MCG tablet TAKE 1/2 TABLET BY MOUTH DAILY 45 tablet 3     lisinopril (ZESTRIL) 40 MG tablet Take 1 tablet (40 mg) by mouth daily (Patient taking differently: Take 20 mg by mouth 2 times daily Takes 1/2 tablet at 2 PM and the other 1/2 tablet around 10 PM) 90 tablet 3     melatonin 5 MG tablet Take 10 mg by mouth At Bedtime        methocarbamol (ROBAXIN) 500 MG tablet Take 2 tablets (1,000 mg) by mouth 4 times daily as needed for muscle spasms 40 tablet 0     metoprolol succinate ER (TOPROL-XL) 50 MG 24 hr tablet Take 1 tablet (50 mg) by mouth daily (Patient taking differently: Take 50 mg by mouth every morning ) 90 tablet 3     Multiple Vitamin (MULTI-VITAMIN) per tablet Take 1 tablet by mouth daily (with lunch)        ondansetron (ZOFRAN-ODT) 4 MG ODT tab Take 1 tablet (4 mg) by mouth every 12 hours as needed for nausea 180 tablet 3     oxyCODONE (ROXICODONE) 5 MG tablet Take 1 tablet (5 mg) by mouth every 6 hours as needed for moderate to severe pain 15 tablet 0     polyethylene glycol (MIRALAX) 17 g packet Take 17 g by mouth 2 times daily (Patient taking differently: Take 17 g by mouth 2 times daily as needed ) 7 packet 0     potassium chloride ER (KLOR-CON M) 20 MEQ CR tablet Take 20 meq three times daily. (Patient taking differently: Take 20 mEq by mouth 3 times daily Take 20 meq three times daily.) 90 tablet 3     senna-docusate (SENOKOT-S/PERICOLACE) 8.6-50 MG tablet Take 2 tablets by mouth 2 times daily 30 tablet 0     spironolactone (ALDACTONE) 25 MG tablet Take 1 tablet (25 mg) by mouth daily 90 tablet 3     terbinafine (LAMISIL AT) 1 % external cream Apply topically 2 times daily For fungal infection not resolved with other antifungals (e.g. Clotrimazole) (Patient taking differently: Apply topically 2 times daily as needed (toenail fungus) For fungal infection not resolved with other antifungals (e.g.  Clotrimazole)) 24 g 11     triamcinolone (KENALOG) 0.1 % external ointment Apply topically 2 times daily (Patient taking differently: Apply topically 2 times daily as needed ) 30 g 11     triamterene-HCTZ (MAXZIDE-25) 37.5-25 MG tablet Take 1 tablet by mouth daily 90 tablet 3     Vaginal Lubricant (REPHRESH) GEL Place 2 g vaginally every 3 days 2 g 3     acetaminophen (TYLENOL) 325 MG tablet Take 2 tablets (650 mg) by mouth every 4 hours as needed for mild pain (Patient not taking: Reported on 12/8/2021) 50 tablet 0     oxyCODONE (ROXICODONE) 5 MG tablet Take 1-2 tablets (5-10 mg) by mouth every 3 hours as needed (Patient not taking: Reported on 12/8/2021) 45 tablet 0       Allergies   Allergen Reactions     Erythromycin Nausea     Penicillins Hives     Around age 6 - doesn't recall full reaction, mother told her it was hives.     Has tolerated cephalsporins.        Mary Del Cid, ATC

## 2021-12-13 NOTE — PROGRESS NOTES
Infectious Disease Clinic Staff Note: Ms. Perez was seen, examined, and the case was discussed with Dr. Rwals, ID Fellow -- I agree with his consultative history and examination, assessment and plan in this outpatient ID Consult note. This note reflects my observations and opinions and the plan outlined fully reflects my approach. I have reviewed the available history, radiology, laboratory results, and reports with the Fellow.

## 2021-12-13 NOTE — PROGRESS NOTES
Orthopaedic Surgery Hand Clinic Progress Note    Patient Name: Nadira Perez  MRN: [id number]  : 1950  Date: 2021    Date of Surgery: 21    Surgery Performed: 1) Open right carpal tunnel release  2) Removal of buried implant (deep) right wrist    Subjective:  Ms. Nadira Perez is a 71 year old female who returns now 11 days s/p above surgery. Doing well. Still with wrist soreness. No fevers. No carpal tunnel related pain as before. Fingers still numb.    Objective:  There were no vitals taken for this visit.    General: alert, appropriate, NAD  HEENT: NC/AT  CV: RRR by pulse  Pulm: Unlabored on RA  MSK:  Volar and dorsal incisions c/d/i, no e/o infection  ROM baseline  Intact EPL/FPL/APB/HI  Diminished sensation median nerve distribution baseline  WWP CR< 2s    Imaging:  None new    Assessment/Plan:  71F 11 days s/p above. Doing well.     -Saw infectious disease due to 1 intraop culture growing staph epidermidis who feel the positive culture to be a likely skin contaminant.  No antibiotics at this time.  Appreciate recommendations  -Continue brace wear  -Avoid heavy lifting twisting gripping until wounds heal  -Discussed possibility of total wrist fusion in the future  -Follow-up with me as needed    Rolan Conley MD    Hand, Upper Extremity & Microvascular Surgery  Department of Orthopedic Surgery  [facility]

## 2021-12-13 NOTE — LETTER
2021         RE: Nadira Perez  [address]  [facility] [facility] [address]        Dear Colleague,    Thank you for referring your patient, Nadira Perez, to the [facility] [place]. Please see a copy of my visit note below.    Orthopaedic Surgery Hand Clinic Progress Note    Patient Name: Nadira Perez  MRN: [id number]  : 1950  Date: 2021    Date of Surgery: 21    Surgery Performed: 1) Open right carpal tunnel release  2) Removal of buried implant (deep) right wrist    Subjective:  Ms. Nadira Perez is a 71 year old female who returns now 11 days s/p above surgery. Doing well. Still with wrist soreness. No fevers. No carpal tunnel related pain as before. Fingers still numb.    Objective:  There were no vitals taken for this visit.    General: alert, appropriate, NAD  HEENT: NC/AT  CV: RRR by pulse  Pulm: Unlabored on RA  MSK:  Volar and dorsal incisions c/d/i, no e/o infection  ROM baseline  Intact EPL/FPL/APB/HI  Diminished sensation median nerve distribution baseline  WWP CR< 2s    Imaging:  None new    Assessment/Plan:  71F 11 days s/p above. Doing well.     -Saw infectious disease due to 1 intraop culture growing staph epidermidis who feel the positive culture to be a likely skin contaminant.  No antibiotics at this time.  Appreciate recommendations  -Continue brace wear  -Avoid heavy lifting twisting gripping until wounds heal  -Discussed possibility of total wrist fusion in the future  -Follow-up with me as needed    Rolan Conley MD    Hand, Upper Extremity & Microvascular Surgery  Department of Orthopedic Surgery  [facility]

## 2021-12-14 ENCOUNTER — OFFICE VISIT (OUTPATIENT)
Dept: ORTHOPEDICS | Facility: CLINIC | Age: 71
End: 2021-12-14
Payer: COMMERCIAL

## 2021-12-14 ENCOUNTER — ANCILLARY PROCEDURE (OUTPATIENT)
Dept: GENERAL RADIOLOGY | Facility: CLINIC | Age: 71
End: 2021-12-14
Attending: ORTHOPAEDIC SURGERY
Payer: COMMERCIAL

## 2021-12-14 VITALS — HEIGHT: 67 IN | WEIGHT: 166 LBS | BODY MASS INDEX: 26.06 KG/M2

## 2021-12-14 DIAGNOSIS — M54.16 LUMBAR RADICULOPATHY: Primary | ICD-10-CM

## 2021-12-14 PROCEDURE — 72100 X-RAY EXAM L-S SPINE 2/3 VWS: CPT | Performed by: RADIOLOGY

## 2021-12-14 PROCEDURE — 99024 POSTOP FOLLOW-UP VISIT: CPT | Mod: GC | Performed by: ORTHOPAEDIC SURGERY

## 2021-12-14 RX ORDER — METHYLPREDNISOLONE 4 MG
TABLET, DOSE PACK ORAL
Qty: 21 TABLET | Refills: 0 | Status: SHIPPED | OUTPATIENT
Start: 2021-12-14 | End: 2022-01-13

## 2021-12-14 RX ORDER — HYDROCODONE BITARTRATE AND ACETAMINOPHEN 5; 325 MG/1; MG/1
1-2 TABLET ORAL 2 TIMES DAILY PRN
Qty: 30 TABLET | Refills: 0 | Status: SHIPPED | OUTPATIENT
Start: 2021-12-14 | End: 2021-12-22

## 2021-12-14 ASSESSMENT — MIFFLIN-ST. JEOR: SCORE: 1310.6

## 2021-12-14 NOTE — PROGRESS NOTES
Spine Surgery Post-Op Visit    Chief Complaint:   RECHECK (4 week follow up L5-S1 PSF and OLIF DOS 11/18/21, has been having pain in her spine since the surgery and she is not sure if its from the surgery or muscle spasms )    Interval HPI:   Nadira Perez is a 71 year old female who is almost 4 weeks weeks s/p L5-S1 OLIF and posterior spinal fusion by Dr. Ramirez on 11/18/21. Pre-op she had right radiculopathy, now it is bilateral, but not as painful. Affects the L5 dermatome. Pre-op she had right foot drop, but now she has bilateral dorsiflexion weakness. The pain and weakness are both gradually improving. She also has peripheral neuropathy, no improvement.  She had some urinary difficulties pre-op, but post-op has some worsening retention and some incontinence. No perineal numbness. She has numbness on upper buttocks but no perineal numbness. She has not walked much since surgery, also had right carpal tunnel release. She is having back spasms, better with lumbar flexion and robaxin.     Has osteoporosis, h/o Zometa infusions. Last DEXA showed improvement. Managed by Dr. Louise in oncology. Due to length of endocrine therapy, it was recommended she stop. Cannot take calcium. Vit D 6000 international unit(s)/day. Takes gabapentin 600 TID. Last oxycodone was last night, usually using 5 mg 1-2 times daily but they haven't been very effective took norco in the past which worked better.          Medications:     Current Outpatient Medications   Medication     HYDROcodone-acetaminophen (NORCO) 5-325 MG tablet     methylPREDNISolone (MEDROL DOSEPAK) 4 MG tablet therapy pack     acetaminophen (TYLENOL) 325 MG tablet     acetaminophen (TYLENOL) 325 MG tablet     amLODIPine (NORVASC) 10 MG tablet     Ascorbic Acid (VITAMIN C) 500 MG CHEW     atorvastatin (LIPITOR) 80 MG tablet     Cholecalciferol (VITAMIN D3) 50 MCG (2000 UT) CAPS     CRANBERRY EXTRACT PO     diclofenac (VOLTAREN) 1 % topical gel     gabapentin  "(NEURONTIN) 300 MG capsule     hydrOXYzine (ATARAX) 10 MG tablet     ibuprofen (ADVIL/MOTRIN) 600 MG tablet     levothyroxine (SYNTHROID/LEVOTHROID) 112 MCG tablet     lisinopril (ZESTRIL) 40 MG tablet     melatonin 5 MG tablet     methocarbamol (ROBAXIN) 500 MG tablet     metoprolol succinate ER (TOPROL-XL) 50 MG 24 hr tablet     Multiple Vitamin (MULTI-VITAMIN) per tablet     ondansetron (ZOFRAN-ODT) 4 MG ODT tab     oxyCODONE (ROXICODONE) 5 MG tablet     oxyCODONE (ROXICODONE) 5 MG tablet     polyethylene glycol (MIRALAX) 17 g packet     potassium chloride ER (KLOR-CON M) 20 MEQ CR tablet     senna-docusate (SENOKOT-S/PERICOLACE) 8.6-50 MG tablet     spironolactone (ALDACTONE) 25 MG tablet     terbinafine (LAMISIL AT) 1 % external cream     triamcinolone (KENALOG) 0.1 % external ointment     triamterene-HCTZ (MAXZIDE-25) 37.5-25 MG tablet     Vaginal Lubricant (REPHRESH) GEL     No current facility-administered medications for this visit.             Physical Exam:     Vitals: Ht 1.702 m (5' 7\")   Wt 75.3 kg (166 lb)   BMI 26.00 kg/m      Constitutional: Patient is healthy, well-nourished and appears stated age.    Respiratory: Patient is breathing normally and in no respiratory distress.    Skin: Incision well healed, no evidence of wound dehiscence or erythema.     Gait: ambulates with walker    Musculoskeletal: 5/5 R iliopsoas, 4+/5 L iliopsoas, 5/5 quadriceps, 5/5 hamstrings, 4/5 L ant tib, 4/5 R anterior tibialis, 4-/5 extensor hallucis longus BL, 5/5 gastrocnemius.           Imaging:   We independently reviewed and interpreted the following imaging at this clinic visit which were also reviewed with patient  Xrays Lumbar spine AP/lat 12/14/2021  L5-S1 changes of anterior/posterior fusion with anterior screw in the L5 vertebral body. Compared to immediate post-op XR, there is some slight increase in spondylolisthesis.      Assessment:   71 year old female 4 weeks s/p L5-S1 OLIF and perc screws with " bilateral radicular pain, concern for hardware failure given change in alignment.     Plan:   Medrol dose pack  Norco 1-2 tabs BID prn  Titrate gabapentin up to 900 TID  Lumbar CT within the next week ideally  F/u with Dr. Ramirez, telephone ok, to discuss results. If it shows any fracture or loosening, will likely need to go on another bone health agent.     The patient was shown their own imaging and all questions were answered. Plan was reviewed with Dr. Ramirez.  Thank you for allowing me to be a part of this patient's care.     GILLES PereaC   Orthopedic Spine Surgery

## 2021-12-14 NOTE — Clinical Note
DANIELAI I ordered a CT, I am concerned she lost some of her correction on the XR today compared to immediate post-op. Since surgery she has developed bilateral radiculopathy and foot drop, was right only pre-op. Good news is that the pain and weakness are both improving. H/o osteoporosis, not on anything. Also increased osiel and sent medrol dose pack.

## 2021-12-14 NOTE — NURSING NOTE
"Reason For Visit:   Chief Complaint   Patient presents with     RECHECK     4 week follow up L5-S1 PSF and OLIF DOS 11/18/21, has been having pain in her spine since the surgery and she is not sure if its from the surgery or muscle spasms        Primary MD: Matilde Quiñonez  Ref. MD: Self     ?  No.  Date of surgery: Dr. Ramirez   Type of surgery: Dr. Ramirez .  Smoker: No  Request smoking cessation information: No    Ht 1.702 m (5' 7\")   Wt 75.3 kg (166 lb)   BMI 26.00 kg/m           Oswestry (JOHN) Questionnaire    OSWESTRY DISABILITY INDEX 11/9/2021   Count 9   Sum 25   Oswestry Score (%) 55.56   Some recent data might be hidden            Neck Disability Index (NDI) Questionnaire    No flowsheet data found.                Promis 10 Assessment    PROMIS 10 10/15/2021   In general, would you say your health is: Good   In general, would you say your quality of life is: Good   In general, how would you rate your physical health? Fair   In general, how would you rate your mental health, including your mood and your ability to think? Very good   In general, how would you rate your satisfaction with your social activities and relationships? Very good   In general, please rate how well you carry out your usual social activities and roles Very good   To what extent are you able to carry out your everyday physical activities such as walking, climbing stairs, carrying groceries, or moving a chair? Moderately   How often have you been bothered by emotional problems such as feeling anxious, depressed or irritable? Rarely   How would you rate your fatigue on average? Moderate   How would you rate your pain on average?   0 = No Pain  to  10 = Worst Imaginable Pain 4   Some recent data might be hidden                Quang Schroeder ATC  "

## 2021-12-14 NOTE — LETTER
12/14/2021         RE: Nadira Perez  [id number]  [facility] [address]        Dear Colleague,    Thank you for referring your patient, Nadira Perez, to the [facility] [place]. Please see a copy of my visit note below.    Spine Surgery Post-Op Visit    Chief Complaint:   RECHECK (4 week follow up L5-S1 PSF and OLIF DOS 11/18/21, has been having pain in her spine since the surgery and she is not sure if its from the surgery or muscle spasms )    Interval HPI:   Nadira Perez is a 71 year old female who is almost 4 weeks weeks s/p L5-S1 OLIF and posterior spinal fusion by Dr. Ramirez on 11/18/21. Pre-op she had right radiculopathy, now it is bilateral, but not as painful. Affects the L5 dermatome. Pre-op she had right foot drop, but now she has bilateral dorsiflexion weakness. The pain and weakness are both gradually improving. She also has peripheral neuropathy, no improvement.  She had some urinary difficulties pre-op, but post-op has some worsening retention and some incontinence. No perineal numbness. She has numbness on upper buttocks but no perineal numbness. She has not walked much since surgery, also had right carpal tunnel release. She is having back spasms, better with lumbar flexion and robaxin.     Has osteoporosis, h/o Zometa infusions. Last DEXA showed improvement. Managed by Dr. Louise in oncology. Due to length of endocrine therapy, it was recommended she stop. Cannot take calcium. Vit D 6000 international unit(s)/day. Takes gabapentin 600 TID. Last oxycodone was last night, usually using 5 mg 1-2 times daily but they haven't been very effective took norco in the past which worked better.          Medications:     Current Outpatient Medications   Medication     HYDROcodone-acetaminophen (NORCO) 5-325 MG tablet     methylPREDNISolone (MEDROL DOSEPAK) 4 MG tablet therapy pack     acetaminophen (TYLENOL) 325 MG tablet     acetaminophen (TYLENOL) 325  "MG tablet     amLODIPine (NORVASC) 10 MG tablet     Ascorbic Acid (VITAMIN C) 500 MG CHEW     atorvastatin (LIPITOR) 80 MG tablet     Cholecalciferol (VITAMIN D3) 50 MCG (2000 UT) CAPS     CRANBERRY EXTRACT PO     diclofenac (VOLTAREN) 1 % topical gel     gabapentin (NEURONTIN) 300 MG capsule     hydrOXYzine (ATARAX) 10 MG tablet     ibuprofen (ADVIL/MOTRIN) 600 MG tablet     levothyroxine (SYNTHROID/LEVOTHROID) 112 MCG tablet     lisinopril (ZESTRIL) 40 MG tablet     melatonin 5 MG tablet     methocarbamol (ROBAXIN) 500 MG tablet     metoprolol succinate ER (TOPROL-XL) 50 MG 24 hr tablet     Multiple Vitamin (MULTI-VITAMIN) per tablet     ondansetron (ZOFRAN-ODT) 4 MG ODT tab     oxyCODONE (ROXICODONE) 5 MG tablet     oxyCODONE (ROXICODONE) 5 MG tablet     polyethylene glycol (MIRALAX) 17 g packet     potassium chloride ER (KLOR-CON M) 20 MEQ CR tablet     senna-docusate (SENOKOT-S/PERICOLACE) 8.6-50 MG tablet     spironolactone (ALDACTONE) 25 MG tablet     terbinafine (LAMISIL AT) 1 % external cream     triamcinolone (KENALOG) 0.1 % external ointment     triamterene-HCTZ (MAXZIDE-25) 37.5-25 MG tablet     Vaginal Lubricant (REPHRESH) GEL     No current facility-administered medications for this visit.             Physical Exam:     Vitals: Ht 1.702 m (5' 7\")   Wt 75.3 kg (166 lb)   BMI 26.00 kg/m      Constitutional: Patient is healthy, well-nourished and appears stated age.    Respiratory: Patient is breathing normally and in no respiratory distress.    Skin: Incision well healed, no evidence of wound dehiscence or erythema.     Gait: ambulates with walker    Musculoskeletal: 5/5 R iliopsoas, 4+/5 L iliopsoas, 5/5 quadriceps, 5/5 hamstrings, 4/5 L ant tib, 4/5 R anterior tibialis, 4-/5 extensor hallucis longus BL, 5/5 gastrocnemius.           Imaging:   We independently reviewed and interpreted the following imaging at this clinic visit which were also reviewed with patient  Xrays Lumbar spine AP/lat " 12/14/2021  L5-S1 changes of anterior/posterior fusion with anterior screw in the L5 vertebral body. Compared to immediate post-op XR, there is some slight increase in spondylolisthesis.      Assessment:   71 year old female 4 weeks s/p L5-S1 OLIF and perc screws with bilateral radicular pain, concern for hardware failure given change in alignment.     Plan:   Medrol dose pack  Norco 1-2 tabs BID prn  Titrate gabapentin up to 900 TID  Lumbar CT within the next week ideally  F/u with Dr. Ramirez, telephone ok, to discuss results. If it shows any fracture or loosening, will likely need to go on another bone health agent.     The patient was shown their own imaging and all questions were answered. Plan was reviewed with Dr. Ramirez.  Thank you for allowing me to be a part of this patient's care.     Serene Millard PA-C   Orthopedic Spine Surgery

## 2021-12-15 ENCOUNTER — HOSPITAL ENCOUNTER (OUTPATIENT)
Dept: PHYSICAL THERAPY | Facility: CLINIC | Age: 71
End: 2021-12-15
Payer: COMMERCIAL

## 2021-12-15 ENCOUNTER — TELEPHONE (OUTPATIENT)
Dept: INTERNAL MEDICINE | Facility: CLINIC | Age: 71
End: 2021-12-15

## 2021-12-15 DIAGNOSIS — L90.5 SCAR CONDITION AND FIBROSIS OF SKIN: ICD-10-CM

## 2021-12-15 DIAGNOSIS — C50.919 MALIGNANT NEOPLASM OF FEMALE BREAST, UNSPECIFIED ESTROGEN RECEPTOR STATUS, UNSPECIFIED LATERALITY, UNSPECIFIED SITE OF BREAST (H): ICD-10-CM

## 2021-12-15 DIAGNOSIS — M47.817 SPONDYLOSIS WITHOUT MYELOPATHY OR RADICULOPATHY, LUMBOSACRAL REGION: ICD-10-CM

## 2021-12-15 DIAGNOSIS — I89.0 LYMPHEDEMA OF LEFT UPPER EXTREMITY: Primary | ICD-10-CM

## 2021-12-15 DIAGNOSIS — R26.89 IMBALANCE: ICD-10-CM

## 2021-12-15 PROCEDURE — 97535 SELF CARE MNGMENT TRAINING: CPT | Mod: GP | Performed by: PHYSICAL THERAPIST

## 2021-12-15 PROCEDURE — 97110 THERAPEUTIC EXERCISES: CPT | Mod: GP | Performed by: PHYSICAL THERAPIST

## 2021-12-15 PROCEDURE — 97140 MANUAL THERAPY 1/> REGIONS: CPT | Mod: GP | Performed by: PHYSICAL THERAPIST

## 2021-12-16 PROBLEM — G93.9 BRAIN LESION: Status: ACTIVE | Noted: 2021-12-16

## 2021-12-17 ENCOUNTER — VIRTUAL VISIT (OUTPATIENT)
Dept: PHARMACY | Facility: CLINIC | Age: 71
End: 2021-12-17
Payer: COMMERCIAL

## 2021-12-17 ENCOUNTER — MYC MEDICAL ADVICE (OUTPATIENT)
Dept: INTERNAL MEDICINE | Facility: CLINIC | Age: 71
End: 2021-12-17
Payer: COMMERCIAL

## 2021-12-17 DIAGNOSIS — G62.9 PERIPHERAL POLYNEUROPATHY: ICD-10-CM

## 2021-12-17 DIAGNOSIS — I10 BENIGN ESSENTIAL HYPERTENSION: Primary | ICD-10-CM

## 2021-12-17 DIAGNOSIS — M48.062 SPINAL STENOSIS OF LUMBAR REGION WITH NEUROGENIC CLAUDICATION: ICD-10-CM

## 2021-12-17 DIAGNOSIS — Z98.890 S/P WRIST SURGERY: ICD-10-CM

## 2021-12-17 DIAGNOSIS — G56.01 RIGHT CARPAL TUNNEL SYNDROME: Primary | ICD-10-CM

## 2021-12-17 PROCEDURE — 99606 MTMS BY PHARM EST 15 MIN: CPT | Performed by: PHARMACIST

## 2021-12-17 PROCEDURE — 99607 MTMS BY PHARM ADDL 15 MIN: CPT | Performed by: PHARMACIST

## 2021-12-17 RX ORDER — METHOCARBAMOL 500 MG/1
TABLET, FILM COATED ORAL
Qty: 40 TABLET | Refills: 0 | Status: SHIPPED | OUTPATIENT
Start: 2021-12-17 | End: 2021-12-23

## 2021-12-17 NOTE — TELEPHONE ENCOUNTER
Central Prior Authorization Team   Phone: [phone]      PA Initiation    Medication: diclofenac (VOLTAREN) 1 % topical gel  Insurance Company: [employer] [place] - Phone [phone] Fax [phone]  Pharmacy Filling the Rx: [facility] [id number] [place] [facility] [place], [place] - [address]  [address]  Filling Pharmacy Phone: [phone]  Filling Pharmacy Fax:    Start Date: 12/17/2021

## 2021-12-17 NOTE — TELEPHONE ENCOUNTER
Prior Authorization Approval    Authorization Effective Date: 9/18/2021  Authorization Expiration Date: 12/17/2022  Medication: diclofenac (VOLTAREN) 1 % topical gel  Approved Dose/Quantity: 100gm per 12 days  Reference #: [id number]   Insurance Company: [employer] [place] - Phone [phone] Fax [phone]  Expected CoPay:       Which Pharmacy is filling the prescription (Not needed for infusion/clinic administered): [facility] [id number] [place] [facility] [id number] [facility]  Pharmacy Notified: Yes  Patient Notified: No

## 2021-12-17 NOTE — PATIENT INSTRUCTIONS
Recommendations from today's MTM visit:                                                       1. To decrease the complication of your regimen take the 40 mg of the lisinopril once per day to decrease the complication of your regimen.    Follow-up: 1 month    It was great to speak with you today.  I value your experience and would be very thankful for your time with providing feedback on our clinic survey. You may receive a survey via email or text message in the next few days.     To schedule another MTM appointment, please call the clinic directly or you may call the MTM scheduling line at [phone] or toll-free at [phone].     My Clinical Pharmacist's contact information:                                                      Please feel free to contact me with any questions or concerns you have.      Willis So, Pharm. D., [facility]  Medication Therapy Management Pharmacist  Direct Voicemail: [phone]

## 2021-12-17 NOTE — PROGRESS NOTES
Medication Therapy Management (MTM) Encounter    ASSESSMENT:                            Medication Adherence/Access: No issues identified    Hypertension: Patient is not meeting blood pressure goal of < 130/80mmHg. She would benefit from restarting spironolactone however she declines at this time due to recovering from back surgery. We will consolidate her lisinopril doses to make her regimen less complicated.    Chronic Pain: Improving.      PLAN:                            1. To decrease the complication of your regimen take the 40 mg of the lisinopril once per day to decrease the complication of your regimen.    Follow-up: 1 month      SUBJECTIVE/OBJECTIVE:                          Nadira Perez is a 71 year old female contacted via secure video for a follow-up visit. She was referred to me from Matilde Quiñonez.  Today's visit is a follow-up MTM visit from 11/17/2021     Reason for visit: Blood pressure follow-up.    Allergies/ADRs: Reviewed in chart  Past Medical History: Reviewed in chart  Tobacco: She reports that she has never smoked. She has never used smokeless tobacco.  Alcohol: rare  Caffeine: diet coke (switching between caffeinated and de-caffeinated) about 6 cans per day (previously 10 per day)    Medication Adherence/Access: no issues reported      Hypertension: Current medications include metoprolol XL 50 mg, at 10 AM she takes the amlodipine 10 mg, at 4 pm she takes lisinopril 20 mg and about 10 pm she takes 20 mg.  She has not been taking potassium since stopping the Maxzide. She reports that its sometimes hard to get all three in. Patient does self-monitor blood pressure. Home BP monitoring in range of 130-140s/80s mmHg. No pressures over 150 systolic.  Patient reports no current medication side effects. Having a lot of urination frequency without diuretics.     Reports that she has lost some weight.      BP Readings from Last 3 Encounters:   12/08/21 (!) 149/89   12/02/21 121/82   11/22/21  119/46       Pain: She takes 1000 mg of acetaminophen three times a day, methocarbamol 500 mg  Every 8 hours and will be changing to every 6 hours (doesn't make her as drowsy as the cyclobenzaprine did), Vicodin 5-325 she takes about 2-3 per day, she does take ibuprofen 600 mg three times a day (but she is not using it for 3 months post-op), gabapentin 600 mg twice a day and 900 mg at night (not sure how much this is helping) , and Voltaren 1% (finds this very helpful) gel for hands and knees. Has had back surgery and wrist surgery since our last visit and feels like things are getting better.      Today's Vitals: There were no vitals taken for this visit.  ----------------  Post Discharge Medication Reconciliation Status: discharge medications reconciled and changed, per note/orders.    I spent 30 minutes with this patient today. All changes were made via collaborative practice agreement with MERCEDES Kyle CNP. A copy of the visit note was provided to the patient's primary care provider.    The patient was sent via "[employer] a summary of these recommendations.     Willis So, Pharm. D., BCACP  Medication Therapy Management Pharmacist  Direct Voicemail: [phone]    Telemedicine Visit Details  Type of service:  Video Conference via [employer]  Start Time: 9:00 am  End Time: 9:30 AM  Originating Location (patient location): [place]  Distant Location (provider location):  [facility]     Medication Therapy Recommendations  Benign essential hypertension    Current Medication: lisinopril (ZESTRIL) 40 MG tablet   Rationale: Does not understand instructions - Adherence - Adherence   Recommendation: Change Administration Time   Status: Patient Agreed - Adherence/Education          Current Medication: triamterene-HCTZ (MAXZIDE-25) 37.5-25 MG tablet   Rationale: Undesirable effect - Adverse medication event - Safety   Recommendation: Change Medication - SPIRONOLACTONE   Status: Accepted per  Provider

## 2021-12-19 ENCOUNTER — HEALTH MAINTENANCE LETTER (OUTPATIENT)
Age: 71
End: 2021-12-19

## 2021-12-21 ENCOUNTER — HOSPITAL ENCOUNTER (OUTPATIENT)
Dept: CT IMAGING | Facility: CLINIC | Age: 71
Discharge: HOME | End: 2021-12-21
Attending: PHYSICIAN ASSISTANT | Admitting: PHYSICIAN ASSISTANT
Payer: COMMERCIAL

## 2021-12-21 ENCOUNTER — TELEPHONE (OUTPATIENT)
Dept: ORTHOPEDICS | Facility: CLINIC | Age: 71
End: 2021-12-21

## 2021-12-21 DIAGNOSIS — M54.16 LUMBAR RADICULOPATHY: ICD-10-CM

## 2021-12-21 LAB — RADIOLOGIST FLAGS: ABNORMAL

## 2021-12-21 PROCEDURE — 72131 CT LUMBAR SPINE W/O DYE: CPT

## 2021-12-21 NOTE — TELEPHONE ENCOUNTER
[facility] Call Center    Phone Message    May a detailed message be left on voicemail: no     Reason for Call: Other: Quang from Radiology called- requesting Serene Millard call Dr. Rai back about CT lumbar results      Action Taken: Message routed to:  [facility]    Travel Screening: Not Applicable

## 2021-12-22 ENCOUNTER — TELEPHONE (OUTPATIENT)
Dept: ORTHOPEDICS | Facility: CLINIC | Age: 71
End: 2021-12-22
Payer: COMMERCIAL

## 2021-12-22 NOTE — TELEPHONE ENCOUNTER
"[facility] called to report urgent finding on CT lumbar spine completed yesterday:    \"Acute fracture involving the bilateral L5 transverse processes with  extension to the right L5 pedicle. This is accompanied by acute  fracture involving the S1 segment, with extension to the sacral ala  Bilaterally.\"     I will forward onto  team for further  Assessment.   "

## 2021-12-23 ENCOUNTER — MYC REFILL (OUTPATIENT)
Dept: ORTHOPEDICS | Facility: CLINIC | Age: 71
End: 2021-12-23

## 2021-12-23 DIAGNOSIS — M48.062 SPINAL STENOSIS OF LUMBAR REGION WITH NEUROGENIC CLAUDICATION: ICD-10-CM

## 2021-12-23 RX ORDER — METHOCARBAMOL 500 MG/1
500 TABLET, FILM COATED ORAL 3 TIMES DAILY
Qty: 40 TABLET | Refills: 0 | Status: SHIPPED | OUTPATIENT
Start: 2021-12-23 | End: 2022-02-03

## 2021-12-23 NOTE — PROGRESS NOTES
Subjective:  HPI  Physical Exam  Oswestry Score: 55.56 %                 Objective:  System    Physical Exam    General     ROS    Assessment/Plan:    DISCHARGE REPORT    Progress reporting period is from 9/21/21 to 11/09/21.      SUBJECTIVE  Subjective changes noted by patient:   Patient reports that she is anxious to have her surgery done next week.   Current pain level is  4/10    Previous pain level was:       Changes in function:  Yes (See Goal flowsheet attached for changes in current functional level) Changes in function: No changes noted in function since last SOAP note   Adverse reaction to treatment or activity: None     OBJECTIVE  Changes noted in objective findings:  Yes, Patient has lumbar flexion at 100%, limited extension due to pain.  Patient uses a WW for mobility with frequent stretches of lumbar flexion for mobility.Limited extension with bridges due to pain.

## 2021-12-27 ENCOUNTER — OFFICE VISIT (OUTPATIENT)
Dept: ORTHOPEDICS | Facility: CLINIC | Age: 71
End: 2021-12-27
Payer: COMMERCIAL

## 2021-12-27 VITALS — BODY MASS INDEX: 26.06 KG/M2 | HEIGHT: 67 IN | WEIGHT: 166 LBS

## 2021-12-27 DIAGNOSIS — M80.08XG PATHOLOGICAL FRACTURE OF VERTEBRA DUE TO AGE-RELATED OSTEOPOROSIS WITH DELAYED HEALING, SUBSEQUENT ENCOUNTER: Primary | ICD-10-CM

## 2021-12-27 DIAGNOSIS — S32.010G COMPRESSION FRACTURE OF L1 VERTEBRA WITH DELAYED HEALING, SUBSEQUENT ENCOUNTER: ICD-10-CM

## 2021-12-27 DIAGNOSIS — M80.08XG PATHOLOGICAL FRACTURE OF VERTEBRA DUE TO AGE-RELATED OSTEOPOROSIS WITH DELAYED HEALING, SUBSEQUENT ENCOUNTER: ICD-10-CM

## 2021-12-27 DIAGNOSIS — M48.062 SPINAL STENOSIS OF LUMBAR REGION WITH NEUROGENIC CLAUDICATION: Primary | ICD-10-CM

## 2021-12-27 DIAGNOSIS — M54.16 LUMBAR RADICULOPATHY: ICD-10-CM

## 2021-12-27 PROCEDURE — 99214 OFFICE O/P EST MOD 30 MIN: CPT | Performed by: ORTHOPAEDIC SURGERY

## 2021-12-27 RX ORDER — HYDROCODONE BITARTRATE AND ACETAMINOPHEN 5; 325 MG/1; MG/1
1 TABLET ORAL EVERY 6 HOURS PRN
Qty: 28 TABLET | Refills: 0 | Status: SHIPPED | OUTPATIENT
Start: 2021-12-27 | End: 2022-01-03

## 2021-12-27 ASSESSMENT — ENCOUNTER SYMPTOMS
DIARRHEA: 0
FATIGUE: 1
WEIGHT LOSS: 0
MUSCLE WEAKNESS: 1
EYE REDNESS: 0
PARALYSIS: 0
SINUS CONGESTION: 0
DECREASED APPETITE: 0
EYE WATERING: 0
TREMORS: 1
HOARSE VOICE: 0
JOINT SWELLING: 1
POLYPHAGIA: 0
BLOOD IN STOOL: 0
HALLUCINATIONS: 0
POLYDIPSIA: 0
NAUSEA: 1
EYE PAIN: 0
EYE IRRITATION: 0
JAUNDICE: 0
ABDOMINAL PAIN: 0
DIZZINESS: 1
WEAKNESS: 1
HEMATURIA: 0
NUMBNESS: 1
DISTURBANCES IN COORDINATION: 1
NIGHT SWEATS: 0
LIGHT-HEADEDNESS: 1
TINGLING: 1
DIFFICULTY URINATING: 1
SORE THROAT: 0
STIFFNESS: 1
NECK MASS: 0
SYNCOPE: 0
FLANK PAIN: 0
CONSTIPATION: 1
SWOLLEN GLANDS: 0
EXERCISE INTOLERANCE: 1
FEVER: 0
DOUBLE VISION: 0
ALTERED TEMPERATURE REGULATION: 0
TROUBLE SWALLOWING: 1
VOMITING: 0
PALPITATIONS: 1
BLOATING: 0
SLEEP DISTURBANCES DUE TO BREATHING: 0
TASTE DISTURBANCE: 0
MYALGIAS: 1
DYSURIA: 0
BACK PAIN: 1
SEIZURES: 0
HYPOTENSION: 0
HEADACHES: 0
BOWEL INCONTINENCE: 0
MUSCLE CRAMPS: 1
WEIGHT GAIN: 0
SINUS PAIN: 0
SMELL DISTURBANCE: 0
NECK PAIN: 1
DECREASED LIBIDO: 1
CHILLS: 0
ARTHRALGIAS: 1
RECTAL PAIN: 0
LEG PAIN: 1
ORTHOPNEA: 0
HOT FLASHES: 0
LOSS OF CONSCIOUSNESS: 0
INCREASED ENERGY: 1
MEMORY LOSS: 0
HEARTBURN: 0
HYPERTENSION: 1
SPEECH CHANGE: 0
BRUISES/BLEEDS EASILY: 0

## 2021-12-27 ASSESSMENT — MIFFLIN-ST. JEOR: SCORE: 1310.6

## 2021-12-27 NOTE — TELEPHONE ENCOUNTER
DIAGNOSIS: Bone Health evaluation   APPOINTMENT DATE: 1.12.22   NOTES STATUS DETAILS   OFFICE NOTE from referring provider Internal 12.27.21 Dr Serge Ramirez, [facility]  12.14.21   OFFICE NOTE from other specialist Internal    DISCHARGE SUMMARY from hospital N/A    DISCHARGE REPORT from the ER N/A    OPERATIVE REPORT Internal 11.18.21   EMG report N/A    MEDICATION LIST Internal    MRI N/A    DEXA (osteoporosis/bone health) Internal 11.5.21  11.4.19  11.30.18  11.15.17  10.27.16  11.8.13  12.30.11  6.8.09  5.29.09   CT SCAN N/A    XRAYS (IMAGES & REPORTS) Internal Spine images internal

## 2021-12-27 NOTE — TELEPHONE ENCOUNTER
I was out last week unexpectedly due to illness. Patient has appt today with Dr. Ramirez. Will close encounter.

## 2021-12-27 NOTE — TELEPHONE ENCOUNTER
RN send med refill of [place] to [facility] to sign and authorize per Dr. Ramirez's order    Tim Flores RN

## 2021-12-27 NOTE — NURSING NOTE
"Reason For Visit:   Chief Complaint   Patient presents with     RECHECK     follow up on CT lumbar spine, DOS 11/18/21       Primary MD: Matilde Quiñonez  Ref. MD: Self     ?  No  Date of surgery: Dr. Ramirez  Type of surgery: Dr. Ramirez .  Smoker: No  Request smoking cessation information: No    Ht 1.702 m (5' 7\")   Wt 75.3 kg (166 lb)   BMI 26.00 kg/m           Oswestry (JOHN) Questionnaire    OSWESTRY DISABILITY INDEX 12/27/2021   Count 9   Sum 26   Oswestry Score (%) 57.78   Some recent data might be hidden            Neck Disability Index (NDI) Questionnaire    No flowsheet data found.                Promis 10 Assessment    PROMIS 10 10/15/2021   In general, would you say your health is: Good   In general, would you say your quality of life is: Good   In general, how would you rate your physical health? Fair   In general, how would you rate your mental health, including your mood and your ability to think? Very good   In general, how would you rate your satisfaction with your social activities and relationships? Very good   In general, please rate how well you carry out your usual social activities and roles Very good   To what extent are you able to carry out your everyday physical activities such as walking, climbing stairs, carrying groceries, or moving a chair? Moderately   How often have you been bothered by emotional problems such as feeling anxious, depressed or irritable? Rarely   How would you rate your fatigue on average? Moderate   How would you rate your pain on average?   0 = No Pain  to  10 = Worst Imaginable Pain 4   Some recent data might be hidden                Quang Schroeder ATC  "

## 2021-12-27 NOTE — LETTER
12/27/2021         RE: Nadira Perez  [id number]  [place] [id number]        Dear Colleague,    Thank you for referring your patient, Nadira Perez, to the [facility] [place]. Please see a copy of my visit note below.    Spine Surgery Return Clinic Visit      Chief Complaint:   RECHECK (follow up on CT lumbar spine, DOS 11/18/21)      Interval HPI:  Symptom Profile Including: location of symptoms, onset, severity, exacerbating/alleviating factors, previous treatments:        Nadira Perez is a 71 year old female who presents today for follow-up.  She had an L5-S1 interbody fusion and decompression.  Few weeks after surgery she had a fall.  Prior to that she felt she was doing quite well.  On that day she actually had 2 falls.  Since then she has had quite a bit of worsening pain.  Not so much in the legs but mostly in the back and buttock area.  She is walking with a walker and she does go to the grocery and is doing her daily activities.  She came in to see my physician assistant Serene, who noted some change in the position of the instrumentation and ordered an x-ray.  We then got a CT scan.  This showed some hairline fractures around the screws as well as a sacral insufficiency fracture.  She returns today for evaluation.    She says over the weekend she has had a partial improvement in her pain.  She continues to mobilize as noted above.  She has been using a soft brace for comfort.            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     Thyroid disease             Past Surgical History:     Past Surgical History:   Procedure Laterality Date     ABDOMEN SURGERY      ovarian cyst, mesh     ARTHRODESIS WRIST Right      ARTHRODESIS WRIST  02/14/2013    Procedure: ARTHRODESIS WRIST;  left wrist scaphoid excision, four bone fusion, iliac crest bone graft  ( Mac with block);  Surgeon: Av Mendez MD;  [facility]     BIOPSY      skin, vaginal     CATARACT IOL, RT/LT Right 03/13/2018     CATARACT IOL, RT/LT Left 02/20/2018     COLONOSCOPY  12/24/2013    Procedure: COMBINED COLONOSCOPY, SINGLE BIOPSY/POLYPECTOMY BY BIOPSY;  COLONOSCOPY;  Surgeon: Dom Alvarez MD;  Location:  GI     ESOPHAGOSCOPY, GASTROSCOPY, DUODENOSCOPY (EGD), COMBINED N/A 11/23/2016    Procedure: COMBINED ESOPHAGOSCOPY, GASTROSCOPY, DUODENOSCOPY (EGD);  Surgeon: Quinten Feliciano MD;  Location:  GI     EXTERNAL EAR SURGERY      right     EYE SURGERY      radial keratomy     FUSION, SPINE, INTERBODY, OBLIQUE ANTERIOR AND LUMBAR, 2 LEVELS, POST APPROACH, USING OTS N/A 11/18/2021    Procedure: Part 1: Oblique Anterior Interbody Fusion at Lumbar 5 to sacral 1 with use of Bone Morphogenic Protein,;  Surgeon: Serge Ramirez MD;  Location: [facility]     GRAFT BONE FROM ILIAC CREST  02/14/2013    Procedure: GRAFT BONE FROM ILIAC CREST;  mac with block and local infilitration;  Surgeon: Av Mendez MD;  [facility]      BREATH HYDROGEN TEST N/A 10/14/2016    Procedure: HYDROGEN BREATH TEST;  Surgeon: Cheri Barron MD;  Location:  GI     HERNIA REPAIR      umbilical age 18 mos.     HYSTERECTOMY TOTAL ABDOMINAL  05/03/2000     MASTECTOMY MODIFIED RADICAL Bilateral     bilateral; right breast prophylactic     OPTICAL TRACKING SYSTEM FUSION POSTERIOR SPINE LUMBAR N/A 11/18/2021    Procedure: Open Posterior Instrumented Spinal Fusion at Lumbar 5 to Sacral 1, with grimm aguayo osteotomy, use of O-Arm/Stealth;   Surgeon: Serge Ramirez MD;  Location: [facility]     PARATHYROIDECTOMY  09/23/2004    R SUPERIOR     PARATHYROIDECTOMY  09/23/2004    parathyroid resection, subtotal     RELEASE CARPAL TUNNEL Right 12/2/2021    Procedure: RIGHT CARPAL TUNNEL RELEASE, RIGHT WRIST HARDWARE REMOVAL, RIGHT CARPAL BOSS EXCISION;  Surgeon: Rolan Conley MD;  Location: [facility] OR     REMOVE HARDWARE HAND  09/24/2013    Procedure: REMOVE HARDWARE HAND;  Left Hand Screw Removal        RHINOPLASTY  1968     thyr proc skin closed cosmetic manner by subcuticular stitch  01/23/2009     THYROPLASTY  10/09/2009     TONSILLECTOMY  1977     WRIST SURGERY      wrist arthrodesis            Social History:     Social History     Tobacco Use     Smoking status: Never Smoker     Smokeless tobacco: Never Used   Substance Use Topics     Alcohol use: Not Currently     Alcohol/week: 7.0 standard drinks     Types: 7 Glasses of wine per week     Comment: Rare to occasional            Family History:     Family History   Problem Relation Age of Onset     Neurologic Disorder Mother         Anuerysm of Cerebral Artery, Dementia     Diabetes Mother      Thyroid Disease Mother         ,     Cerebrovascular Disease Mother      Dementia Mother      Osteoporosis Mother      Heart Disease Father         AAA     Hypertension Father      Circulatory Brother         Perihperal Neurophathy     Diabetes Maternal Grandmother      Asthma Maternal Grandmother      Chronic Obstructive Pulmonary Disease Maternal Grandfather         father     Asthma Maternal Grandfather      Diabetes Maternal Aunt         x2     Melanoma Maternal Aunt      Glaucoma Maternal Aunt      Breast Cancer Cousin      Dementia Other      Cancer Other         malignant melanoma     Hypertension Other      Hypertension Other      Cerebrovascular Disease Other      Cerebrovascular Disease Other      Obesity Other      Respiratory Other      Cancer Other      Diabetes Other      Asthma Other      Macular  Degeneration No family hx of      Coronary Artery Disease No family hx of      Hyperlipidemia No family hx of      Kidney Disease No family hx of      Thrombosis No family hx of      Arthritis No family hx of      Depression No family hx of      Mental Illness No family hx of      Substance Abuse No family hx of      Cystic Fibrosis No family hx of      Early Death No family hx of      Coronary Artery Disease Early Onset No family hx of      Heart Failure No family hx of      Bleeding Diathesis No family hx of      Ovarian Cancer No family hx of      Uterine Cancer No family hx of      Prostate Cancer No family hx of      Colorectal Cancer No family hx of      Pancreatic Cancer No family hx of      Lung Cancer No family hx of      Other Cancer No family hx of      Autoimmune Disease No family hx of      Unknown/Adopted No family hx of      Genetic Disorder No family hx of      Bleeding Disorder No family hx of      Clotting Disorder No family hx of      Anesthesia Reaction No family hx of             Allergies:     Allergies   Allergen Reactions     Erythromycin Nausea     Penicillins Hives     Around age 6 - doesn't recall full reaction, mother told her it was hives.     Has tolerated cephalsporins.             Medications:     Current Outpatient Medications   Medication     spironolactone (ALDACTONE) 25 MG tablet     acetaminophen (TYLENOL) 325 MG tablet     amLODIPine (NORVASC) 10 MG tablet     Ascorbic Acid (VITAMIN C) 500 MG CHEW     atorvastatin (LIPITOR) 80 MG tablet     Cholecalciferol (VITAMIN D3) 50 MCG (2000 UT) CAPS     CRANBERRY EXTRACT PO     diclofenac (VOLTAREN) 1 % topical gel     gabapentin (NEURONTIN) 300 MG capsule     hydrOXYzine (ATARAX) 10 MG tablet     ibuprofen (ADVIL/MOTRIN) 600 MG tablet     levothyroxine (SYNTHROID/LEVOTHROID) 112 MCG tablet     lisinopril (ZESTRIL) 40 MG tablet     melatonin 5 MG tablet     methocarbamol (ROBAXIN) 500 MG tablet     methylPREDNISolone (MEDROL DOSEPAK) 4  "MG tablet therapy pack     metoprolol succinate ER (TOPROL-XL) 50 MG 24 hr tablet     Multiple Vitamin (MULTI-VITAMIN) per tablet     ondansetron (ZOFRAN-ODT) 4 MG ODT tab     polyethylene glycol (MIRALAX) 17 g packet     potassium chloride ER (KLOR-CON M) 20 MEQ CR tablet     senna-docusate (SENOKOT-S/PERICOLACE) 8.6-50 MG tablet     terbinafine (LAMISIL AT) 1 % external cream     triamcinolone (KENALOG) 0.1 % external ointment     triamterene-HCTZ (MAXZIDE-25) 37.5-25 MG tablet     Vaginal Lubricant (REPHRESH) GEL     No current facility-administered medications for this visit.             Review of Systems:   A focused musculoskeletal and neurologic ROS was performed with pertinent positives and negatives noted in the HPI.  Additional systems were also reviewed and are documented at the bottom of the note.         Physical Exam:   Vitals: Ht 1.702 m (5' 7\")   Wt 75.3 kg (166 lb)   BMI 26.00 kg/m    Musculoskeletal, Neurologic, and Spine:            Lumbar Spine:    Appearance - No gross stepoffs or deformities    Motor -     L2-3: Hip flexion 5/5 R and 5/5 L strength          L3/4:  Knee extension R 5/5 and L 5/5 strength         L4/5:  Foot dorsiflexion R 4+/5 L 4+/5 and       EHL dorsiflexion R 4/5 L 4/5 strength         S1:  Plantarflexion/Peroneal Muscles  R 5/5 and L 5/5 strength    Sensation: intact to light touch L3-S1 distribution BLE, parasthesias L5               Imaging:   We ordered and independently reviewed new radiographs at this clinic visit. The results were discussed with the patient. Findings include:     Reviewed her postoperative x-ray and CT scan.  It appears like there are fractures around the L5 pedicle screws as well as a sacral insufficiency fracture.  Low bone density is noted.       Assessment and Plan:     71 year old female with osteoporotic fractures around her L5 pedicle screws as well as a sacral insufficiency fracture.    She does feel like she is making some clinical " improvements.  I told her we have 2 options at this time.  1 option would be to continue to monitor things.  There is a chance that this fracture will heal in place.  In addition I would want her to see one of our bone health specialist to see if she is a candidate for Forteo or Prolia.    The other option would be to revise her by extending the instrumentation up and down to try to span the area of fracture.  There are certainly risks with this approach, including the possibility that she could have a fracture adjacent to the new instrumentation as well.  Thus I do think there would be benefit to her of having a period of bone health optimization whether or not she needs a revision surgery.  As best I can tell she is neurologically intact.  There is some trace dorsiflexion weakness, but she had this before surgery, and it does seem to be somewhat pain limited.  I do not think that there is profound weakness that would push us towards an emergency surgery and thus there is a benefit to optimizing her bone health while we see if this will heal on its own.    I am going to make these referrals and I would like to see her back in clinic in 1 month with a standing lumbar radiographs.  She will let me know if the symptoms worsen and I will also refill her hydrocodone.     Respectfully,  Serge Ramirez MD  Spine Surgery  [facility]    Answers for HPI/ROS submitted by the patient on 12/27/2021  General Symptoms: Yes  Skin Symptoms: No  HENT Symptoms: Yes  EYE SYMPTOMS: Yes  HEART SYMPTOMS: Yes  LUNG SYMPTOMS: No  INTESTINAL SYMPTOMS: Yes  URINARY SYMPTOMS: Yes  GYNECOLOGIC SYMPTOMS: Yes  BREAST SYMPTOMS: No  SKELETAL SYMPTOMS: Yes  BLOOD SYMPTOMS: Yes  NERVOUS SYSTEM SYMPTOMS: Yes  MENTAL HEALTH SYMPTOMS: No  Ear pain: No  Ear discharge: No  Hearing loss: Yes  Tinnitus: No  Nosebleeds: No  Congestion: No  Sinus pain: No  Trouble swallowing: Yes   Voice hoarseness: No  Mouth sores: No  Sore throat:  No  Tooth pain: No  Gum tenderness: No  Bleeding gums: No  Change in taste: No  Change in sense of smell: No  Dry mouth: Yes  Hearing aid used: Yes  Neck lump: No  Fever: No  Loss of appetite: No  Weight loss: No  Weight gain: No  Fatigue: Yes  Night sweats: No  Chills: No  Increased stress: No  Excessive hunger: No  Excessive thirst: No  Feeling hot or cold when others believe the temperature is normal: No  Loss of height: No  Post-operative complications: Yes  Surgical site pain: Yes  Hallucinations: No  Change in or Loss of Energy: Yes  Hyperactivity: No  Confusion: No  Eye pain: No  Vision loss: No  Dry eyes: Yes  Watery eyes: No  Eye bulging: No  Double vision: No  Flashing of lights: No  Spots: Yes  Floaters: Yes  Redness: No  Crossed eyes: No  Tunnel Vision: No  Yellowing of eyes: No  Eye irritation: No  Chest pain or pressure: No  Fast or irregular heartbeat: Yes  Pain in legs with walking: Yes  Trouble breathing while lying down: No  Fingers or toes appear blue: No  High blood pressure: Yes  Low blood pressure: No  Fainting: No  Murmurs: Yes  Pacemaker: No  Varicose veins: No  Wake up at night with shortness of breath: No  Light-headedness: Yes  Exercise intolerance: Yes  Heart burn or indigestion: No  Nausea: Yes  Vomiting: No  Abdominal pain: No  Bloating: No  Constipation: Yes  Diarrhea: No  Blood in stool: No  Black stools: No  Rectal or Anal pain: No  Fecal incontinence: No  Yellowing of skin or eyes: No  Vomit with blood: No  Change in stools: No  Trouble holding urine or incontinence: Yes  Pain or burning: No  Trouble starting or stopping: Yes  Increased frequency of urination: Yes  Blood in urine: No  Decreased frequency of urination: No  Frequent nighttime urination: Yes  Flank pain: No  Difficulty emptying bladder: Yes  Bleeding or spotting between periods: No  Heavy or painful periods: No  Irregular periods: No  Vaginal discharge: No  Hot flashes: No  Vaginal dryness: Yes  Genital ulcers:  No  Reduced libido: Yes  Painful intercourse: Yes  Difficulty with sexual arousal: Yes  Post-menopausal bleeding: No  Back pain: Yes  Muscle aches: Yes  Neck pain: Yes  Swollen joints: Yes  Joint pain: Yes  Bone pain: Yes  Muscle cramps: Yes  Muscle weakness: Yes  Joint stiffness: Yes  Bone fracture: Yes  Edema or swelling: Yes  Anemia: No  Swollen glands: No  Easy bleeding or bruising: No  Trouble with coordination: Yes  Dizziness or trouble with balance: Yes  Fainting or black-out spells: No  Memory loss: No  Headache: No  Seizures: No  Speech problems: No  Tingling: Yes  Tremor: Yes  Weakness: Yes  Difficulty walking: Yes  Paralysis: No  Numbness: Yes       Statement Selected

## 2021-12-27 NOTE — PROGRESS NOTES
Spine Surgery Return Clinic Visit      Chief Complaint:   RECHECK (follow up on CT lumbar spine, DOS 11/18/21)      Interval HPI:  Symptom Profile Including: location of symptoms, onset, severity, exacerbating/alleviating factors, previous treatments:        Nadira Perez is a 71 year old female who presents today for follow-up.  She had an L5-S1 interbody fusion and decompression.  Few weeks after surgery she had a fall.  Prior to that she felt she was doing quite well.  On that day she actually had 2 falls.  Since then she has had quite a bit of worsening pain.  Not so much in the legs but mostly in the back and buttock area.  She is walking with a walker and she does go to the grocery and is doing her daily activities.  She came in to see my physician assistant Serene, who noted some change in the position of the instrumentation and ordered an x-ray.  We then got a CT scan.  This showed some hairline fractures around the screws as well as a sacral insufficiency fracture.  She returns today for evaluation.    She says over the weekend she has had a partial improvement in her pain.  She continues to mobilize as noted above.  She has been using a soft brace for comfort.            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     Thyroid disease             Past Surgical History:     Past Surgical History:   Procedure Laterality Date     ABDOMEN SURGERY      ovarian cyst, mesh     ARTHRODESIS WRIST Right      ARTHRODESIS WRIST  02/14/2013    Procedure: ARTHRODESIS WRIST;  left  wrist scaphoid excision, four bone fusion, iliac crest bone graft  ( Mac with block);  Surgeon: Av Mendez MD;  [facility]     BIOPSY      skin, vaginal     CATARACT IOL, RT/LT Right 03/13/2018     CATARACT IOL, RT/LT Left 02/20/2018     COLONOSCOPY  12/24/2013    Procedure: COMBINED COLONOSCOPY, SINGLE BIOPSY/POLYPECTOMY BY BIOPSY;  COLONOSCOPY;  Surgeon: Dom Alvarez MD;  Location:  GI     ESOPHAGOSCOPY, GASTROSCOPY, DUODENOSCOPY (EGD), COMBINED N/A 11/23/2016    Procedure: COMBINED ESOPHAGOSCOPY, GASTROSCOPY, DUODENOSCOPY (EGD);  Surgeon: Quinten Feliciano MD;  Location:  GI     EXTERNAL EAR SURGERY      right     EYE SURGERY      radial keratomy     FUSION, SPINE, INTERBODY, OBLIQUE ANTERIOR AND LUMBAR, 2 LEVELS, POST APPROACH, USING OTS N/A 11/18/2021    Procedure: Part 1: Oblique Anterior Interbody Fusion at Lumbar 5 to sacral 1 with use of Bone Morphogenic Protein,;  Surgeon: Serge Ramirez MD;  [facility]: [facility]     GRAFT BONE FROM ILIAC CREST  02/14/2013    Procedure: GRAFT BONE FROM ILIAC CREST;  mac with block and local infilitration;  Surgeon: Av Mendez MD;  [facility]     HC BREATH HYDROGEN TEST N/A 10/14/2016    Procedure: HYDROGEN BREATH TEST;  Surgeon: Cheri Barron MD;  Location:  GI     HERNIA REPAIR      umbilical age 18 mos.     HYSTERECTOMY TOTAL ABDOMINAL  05/03/2000     MASTECTOMY MODIFIED RADICAL Bilateral     bilateral; right breast prophylactic     OPTICAL TRACKING SYSTEM FUSION POSTERIOR SPINE LUMBAR N/A 11/18/2021    Procedure: Open Posterior Instrumented Spinal Fusion at Lumbar 5 to Sacral 1, with grimm aguayo osteotomy, use of O-Arm/Stealth;  Surgeon: Serge Ramirez MD;  [facility]: [facility]     PARATHYROIDECTOMY  09/23/2004    R SUPERIOR     PARATHYROIDECTOMY  09/23/2004    parathyroid resection, subtotal     RELEASE CARPAL TUNNEL Right 12/2/2021    Procedure: RIGHT CARPAL TUNNEL RELEASE, RIGHT WRIST  HARDWARE REMOVAL, RIGHT CARPAL BOSS EXCISION;  Surgeon: Rolan Conley MD;  Location: [facility] OR     REMOVE HARDWARE HAND  09/24/2013    Procedure: REMOVE HARDWARE HAND;  Left Hand Screw Removal        RHINOPLASTY  1968     thyr proc skin closed cosmetic manner by subcuticular stitch  01/23/2009     THYROPLASTY  10/09/2009     TONSILLECTOMY  1977     WRIST SURGERY      wrist arthrodesis            Social History:     Social History     Tobacco Use     Smoking status: Never Smoker     Smokeless tobacco: Never Used   Substance Use Topics     Alcohol use: Not Currently     Alcohol/week: 7.0 standard drinks     Types: 7 Glasses of wine per week     Comment: Rare to occasional            Family History:     Family History   Problem Relation Age of Onset     Neurologic Disorder Mother         Anuerysm of Cerebral Artery, Dementia     Diabetes Mother      Thyroid Disease Mother         ,     Cerebrovascular Disease Mother      Dementia Mother      Osteoporosis Mother      Heart Disease Father         AAA     Hypertension Father      Circulatory Brother         Perihperal Neurophathy     Diabetes Maternal Grandmother      Asthma Maternal Grandmother      Chronic Obstructive Pulmonary Disease Maternal Grandfather         father     Asthma Maternal Grandfather      Diabetes Maternal Aunt         x2     Melanoma Maternal Aunt      Glaucoma Maternal Aunt      Breast Cancer Cousin      Dementia Other      Cancer Other         malignant melanoma     Hypertension Other      Hypertension Other      Cerebrovascular Disease Other      Cerebrovascular Disease Other      Obesity Other      Respiratory Other      Cancer Other      Diabetes Other      Asthma Other      Macular Degeneration No family hx of      Coronary Artery Disease No family hx of      Hyperlipidemia No family hx of      Kidney Disease No family hx of      Thrombosis No family hx of      Arthritis No family hx of      Depression No family hx of      Mental Illness No family  hx of      Substance Abuse No family hx of      Cystic Fibrosis No family hx of      Early Death No family hx of      Coronary Artery Disease Early Onset No family hx of      Heart Failure No family hx of      Bleeding Diathesis No family hx of      Ovarian Cancer No family hx of      Uterine Cancer No family hx of      Prostate Cancer No family hx of      Colorectal Cancer No family hx of      Pancreatic Cancer No family hx of      Lung Cancer No family hx of      Other Cancer No family hx of      Autoimmune Disease No family hx of      Unknown/Adopted No family hx of      Genetic Disorder No family hx of      Bleeding Disorder No family hx of      Clotting Disorder No family hx of      Anesthesia Reaction No family hx of             Allergies:     Allergies   Allergen Reactions     Erythromycin Nausea     Penicillins Hives     Around age 6 - doesn't recall full reaction, mother told her it was hives.     Has tolerated cephalsporins.             Medications:     Current Outpatient Medications   Medication     spironolactone (ALDACTONE) 25 MG tablet     acetaminophen (TYLENOL) 325 MG tablet     amLODIPine (NORVASC) 10 MG tablet     Ascorbic Acid (VITAMIN C) 500 MG CHEW     atorvastatin (LIPITOR) 80 MG tablet     Cholecalciferol (VITAMIN D3) 50 MCG (2000 UT) CAPS     CRANBERRY EXTRACT PO     diclofenac (VOLTAREN) 1 % topical gel     gabapentin (NEURONTIN) 300 MG capsule     hydrOXYzine (ATARAX) 10 MG tablet     ibuprofen (ADVIL/MOTRIN) 600 MG tablet     levothyroxine (SYNTHROID/LEVOTHROID) 112 MCG tablet     lisinopril (ZESTRIL) 40 MG tablet     melatonin 5 MG tablet     methocarbamol (ROBAXIN) 500 MG tablet     methylPREDNISolone (MEDROL DOSEPAK) 4 MG tablet therapy pack     metoprolol succinate ER (TOPROL-XL) 50 MG 24 hr tablet     Multiple Vitamin (MULTI-VITAMIN) per tablet     ondansetron (ZOFRAN-ODT) 4 MG ODT tab     polyethylene glycol (MIRALAX) 17 g packet     potassium chloride ER (KLOR-CON M) 20 MEQ CR  "tablet     senna-docusate (SENOKOT-S/PERICOLACE) 8.6-50 MG tablet     terbinafine (LAMISIL AT) 1 % external cream     triamcinolone (KENALOG) 0.1 % external ointment     triamterene-HCTZ (MAXZIDE-25) 37.5-25 MG tablet     Vaginal Lubricant (REPHRESH) GEL     No current facility-administered medications for this visit.             Review of Systems:   A focused musculoskeletal and neurologic ROS was performed with pertinent positives and negatives noted in the HPI.  Additional systems were also reviewed and are documented at the bottom of the note.         Physical Exam:   Vitals: Ht 1.702 m (5' 7\")   Wt 75.3 kg (166 lb)   BMI 26.00 kg/m    Musculoskeletal, Neurologic, and Spine:            Lumbar Spine:    Appearance - No gross stepoffs or deformities    Motor -     L2-3: Hip flexion 5/5 R and 5/5 L strength          L3/4:  Knee extension R 5/5 and L 5/5 strength         L4/5:  Foot dorsiflexion R 4+/5 L 4+/5 and       EHL dorsiflexion R 4/5 L 4/5 strength         S1:  Plantarflexion/Peroneal Muscles  R 5/5 and L 5/5 strength    Sensation: intact to light touch L3-S1 distribution BLE, parasthesias L5               Imaging:   We ordered and independently reviewed new radiographs at this clinic visit. The results were discussed with the patient. Findings include:     Reviewed her postoperative x-ray and CT scan.  It appears like there are fractures around the L5 pedicle screws as well as a sacral insufficiency fracture.  Low bone density is noted.       Assessment and Plan:     71 year old female with osteoporotic fractures around her L5 pedicle screws as well as a sacral insufficiency fracture.    She does feel like she is making some clinical improvements.  I told her we have 2 options at this time.  1 option would be to continue to monitor things.  There is a chance that this fracture will heal in place.  In addition I would want her to see one of our bone health specialist to see if she is a candidate for Forteo " or Prolia.    The other option would be to revise her by extending the instrumentation up and down to try to span the area of fracture.  There are certainly risks with this approach, including the possibility that she could have a fracture adjacent to the new instrumentation as well.  Thus I do think there would be benefit to her of having a period of bone health optimization whether or not she needs a revision surgery.  As best I can tell she is neurologically intact.  There is some trace dorsiflexion weakness, but she had this before surgery, and it does seem to be somewhat pain limited.  I do not think that there is profound weakness that would push us towards an emergency surgery and thus there is a benefit to optimizing her bone health while we see if this will heal on its own.    I am going to make these referrals and I would like to see her back in clinic in 1 month with a standing lumbar radiographs.  She will let me know if the symptoms worsen and I will also refill her hydrocodone.     Respectfully,  Serge Ramirez MD  Spine Surgery  [facility]    Answers for HPI/ROS submitted by the patient on 12/27/2021  General Symptoms: Yes  Skin Symptoms: No  HENT Symptoms: Yes  EYE SYMPTOMS: Yes  HEART SYMPTOMS: Yes  LUNG SYMPTOMS: No  INTESTINAL SYMPTOMS: Yes  URINARY SYMPTOMS: Yes  GYNECOLOGIC SYMPTOMS: Yes  BREAST SYMPTOMS: No  SKELETAL SYMPTOMS: Yes  BLOOD SYMPTOMS: Yes  NERVOUS SYSTEM SYMPTOMS: Yes  MENTAL HEALTH SYMPTOMS: No  Ear pain: No  Ear discharge: No  Hearing loss: Yes  Tinnitus: No  Nosebleeds: No  Congestion: No  Sinus pain: No  Trouble swallowing: Yes   Voice hoarseness: No  Mouth sores: No  Sore throat: No  Tooth pain: No  Gum tenderness: No  Bleeding gums: No  Change in taste: No  Change in sense of smell: No  Dry mouth: Yes  Hearing aid used: Yes  Neck lump: No  Fever: No  Loss of appetite: No  Weight loss: No  Weight gain: No  Fatigue: Yes  Night sweats: No  Chills:  No  Increased stress: No  Excessive hunger: No  Excessive thirst: No  Feeling hot or cold when others believe the temperature is normal: No  Loss of height: No  Post-operative complications: Yes  Surgical site pain: Yes  Hallucinations: No  Change in or Loss of Energy: Yes  Hyperactivity: No  Confusion: No  Eye pain: No  Vision loss: No  Dry eyes: Yes  Watery eyes: No  Eye bulging: No  Double vision: No  Flashing of lights: No  Spots: Yes  Floaters: Yes  Redness: No  Crossed eyes: No  Tunnel Vision: No  Yellowing of eyes: No  Eye irritation: No  Chest pain or pressure: No  Fast or irregular heartbeat: Yes  Pain in legs with walking: Yes  Trouble breathing while lying down: No  Fingers or toes appear blue: No  High blood pressure: Yes  Low blood pressure: No  Fainting: No  Murmurs: Yes  Pacemaker: No  Varicose veins: No  Wake up at night with shortness of breath: No  Light-headedness: Yes  Exercise intolerance: Yes  Heart burn or indigestion: No  Nausea: Yes  Vomiting: No  Abdominal pain: No  Bloating: No  Constipation: Yes  Diarrhea: No  Blood in stool: No  Black stools: No  Rectal or Anal pain: No  Fecal incontinence: No  Yellowing of skin or eyes: No  Vomit with blood: No  Change in stools: No  Trouble holding urine or incontinence: Yes  Pain or burning: No  Trouble starting or stopping: Yes  Increased frequency of urination: Yes  Blood in urine: No  Decreased frequency of urination: No  Frequent nighttime urination: Yes  Flank pain: No  Difficulty emptying bladder: Yes  Bleeding or spotting between periods: No  Heavy or painful periods: No  Irregular periods: No  Vaginal discharge: No  Hot flashes: No  Vaginal dryness: Yes  Genital ulcers: No  Reduced libido: Yes  Painful intercourse: Yes  Difficulty with sexual arousal: Yes  Post-menopausal bleeding: No  Back pain: Yes  Muscle aches: Yes  Neck pain: Yes  Swollen joints: Yes  Joint pain: Yes  Bone pain: Yes  Muscle cramps: Yes  Muscle weakness: Yes  Joint  stiffness: Yes  Bone fracture: Yes  Edema or swelling: Yes  Anemia: No  Swollen glands: No  Easy bleeding or bruising: No  Trouble with coordination: Yes  Dizziness or trouble with balance: Yes  Fainting or black-out spells: No  Memory loss: No  Headache: No  Seizures: No  Speech problems: No  Tingling: Yes  Tremor: Yes  Weakness: Yes  Difficulty walking: Yes  Paralysis: No  Numbness: Yes

## 2021-12-27 NOTE — TELEPHONE ENCOUNTER
PDMP Review       Value Time User    State PDMP site checked  Yes 9/23/2021  4:09 PM Matilde Quiñonez, MERCEDES CNP        Last prescription filled by Serene Schmitz 12/14/2021.  Refill of hydrocodone 1 tablet every 6 hours for 7 days.  Maximum of 4 tablets a day.     Bishop ERUM Merida PA-C

## 2021-12-28 ENCOUNTER — TELEPHONE (OUTPATIENT)
Dept: INTERNAL MEDICINE | Facility: CLINIC | Age: 71
End: 2021-12-28
Payer: COMMERCIAL

## 2021-12-28 DIAGNOSIS — Z51.81 ENCOUNTER FOR MONITORING DIURETIC THERAPY: Primary | ICD-10-CM

## 2021-12-28 DIAGNOSIS — Z79.899 ENCOUNTER FOR MONITORING DIURETIC THERAPY: Primary | ICD-10-CM

## 2021-12-28 NOTE — TELEPHONE ENCOUNTER
Pt currently taking spironolactone 25 mg a day for past three days  But edema has recurred.  Wondering if she should go back oh HCTZ.      Advised to continue spironolactone for another 3-4 days, and if edema not improved increase to 50 mg a day.  I will place an order for lab: K for future.     Matilde LONG, CNP

## 2022-01-04 PROBLEM — M47.817 SPONDYLOSIS WITHOUT MYELOPATHY OR RADICULOPATHY, LUMBOSACRAL REGION: Status: RESOLVED | Noted: 2021-09-23 | Resolved: 2022-01-04

## 2022-01-04 NOTE — PROGRESS NOTES
Assessment/Plan:    ASSESSMENT/PLAN  Updated problem list and treatment plan: PT Diagnosis: Low back pain with hip strength deficits and balance deficits   STG/LTGs have been met:  Yes (See Goal flow sheet completed today.)  Progress toward STG/LTGs have been made:  Yes (See Goal flow sheet completed today.)  Assessment of Progress: The patient's condition is improving.  The patient's condition has potential to improve.  Self Management Plans:  Patient has been instructed in a home treatment program.  Patient  has been instructed in self management of symptoms.  Patient continues to require the following intervention to meet STG and LT's:  Other    Recommendations:  Patient is scheduled for surgery next week and will likely be a candidate for PT afterwards. This episode of PT care is being concluded as it would be appropriate to perform a new evaluation when she would return to therapy following the surgery.     Please refer to the daily flowsheet for treatment today, total treatment time and time spent performing 1:1 timed codes.

## 2022-01-11 ENCOUNTER — THERAPY VISIT (OUTPATIENT)
Dept: OCCUPATIONAL THERAPY | Facility: CLINIC | Age: 72
End: 2022-01-11
Attending: STUDENT IN AN ORGANIZED HEALTH CARE EDUCATION/TRAINING PROGRAM
Payer: OTHER MISCELLANEOUS

## 2022-01-11 DIAGNOSIS — Z98.890 S/P WRIST SURGERY: ICD-10-CM

## 2022-01-11 DIAGNOSIS — G56.01 RIGHT CARPAL TUNNEL SYNDROME: ICD-10-CM

## 2022-01-11 DIAGNOSIS — M79.642 PAIN IN BOTH HANDS: ICD-10-CM

## 2022-01-11 DIAGNOSIS — M79.641 PAIN IN BOTH HANDS: ICD-10-CM

## 2022-01-11 PROCEDURE — 97110 THERAPEUTIC EXERCISES: CPT | Mod: GO | Performed by: OCCUPATIONAL THERAPIST

## 2022-01-11 PROCEDURE — 97165 OT EVAL LOW COMPLEX 30 MIN: CPT | Mod: GO | Performed by: OCCUPATIONAL THERAPIST

## 2022-01-11 PROCEDURE — 97140 MANUAL THERAPY 1/> REGIONS: CPT | Mod: GO | Performed by: OCCUPATIONAL THERAPIST

## 2022-01-11 NOTE — PROGRESS NOTES
[facility] Hand Therapy Initial Evaluation   Current Date: 1/11/2022    Diagnosis: R CTS, Dorsal Wrist pain   DOI: 5/28/2002 (MD order date 12/17/21)  DOS: 12/2/21  Procedure:R CTR, Removal of hardware from failed 4 corner fuxson with loose screw in the radiocarpal joint    Subjective:  Patient Health History  Nadira Perez being seen for Begin bilateral hand therapy.     Problem began: 1/11/2022.   Problem occurred: Injury May 28, 2004 at [place]     General health as reported by patient is fair.  Pertinent medical history includes: anemia, changes in bowel/bladder, cancer, history of fractures, heart problems, hepatitis, high blood pressure, numbness/tingling, overweight, osteoarthritis, osteoporosis, pain at night/rest, thyroid problems and weakness.        Surgeries include:  Orthopedic surgery, cancer surgery and other. Other surgery history details: See medical record..    Current medications:  High blood pressure medication, muscle relaxants, pain medication, sleep medication, steroids and thyroid medication.       Primary job tasks include:  Computer work and prolonged sitting.                  Occupational Profile Information:  Right hand dominant  Prior functional level:  help with housework--dishes, laundry, shoveling walks  Patient reports symptoms of pain, stiffness/loss of motion, weakness/loss of strength, edema, numbness and tingling   Special tests:  x-ray.    Previous treatment: surgery  Barriers include:live alone  Mobility: Ambulates with aid of walker  Transportation: drives  Currently retired since May 2021  Leisure activities/hobbies: care giving with family, word games, puzzles, socializing with [email or web address]    Upper Extremity Functional Index Score:  SCORE:   Column Totals: /80: (P) 22   (A lower score indicates greater disability.)    Objective:  Pain Level (Scale 0-10)   1/11/2022   At Rest 0   With Use 6     Pain Description  Date 1/11/2022   Location R volar wrist >dorsal wrist   Pain  Quality Aching, Burning and Sharp   Frequency intermittent     Pain is worst  daytime or nighttime   Exacerbated by  use--picking up ceramic bird dish   Relieved by rest   Progression Gradually improving     Edema (Circumference measured in cm)   1/11/2022 1/11/2022    L R   DWC 19.3 18.3      Scar   Sensitivity: none Quality:  Mild tenderness to touch, mild adherence present in dorsal scar, mild to moderate adherence present in volar scar    Sensation   WNL throughout all nerve distributions; per patient report    ROM  Thumb and Fingers: WNL per visual observation compared to the L    Pain Report: - none  + mild    ++ moderate    +++ severe   Wrist 1/11/2022 1/11/2022   AROM (PROM) L R   Extension 43 35   Flexion 22 35   RD 10 12   UD 18 33     Strength   (Measured in pounds)  Contraindicated    Assessment:  Patient presents with symptoms consistent with diagnosis of right wrist Carpal Tunnel Syndrome and Dorsal Wrist pain, with surgical intervention.    Patient's limitations or Problem List includes:  Pain, Decreased ROM/motion, Increased edema, Weakness, Decreased  and Decreased pinch of the right wrist which interferes with the patient's ability to perform Self Care Tasks (dressing, eating, bathing, hygiene/toileting), Sleep Patterns, Recreational Activities, Household Chores and Driving  as compared to previous level of function.    Rehab Potential:  Good - Return to full activity, some limitations    Patient will benefit from skilled Occupational Therapy to increase ROM, flexibility, overall strength,  strength, pinch strength, coordination and dexterity and decrease pain, edema and adherence of scarring to return to previous activity level and resume normal daily tasks and to reach their rehab potential.    Barriers to Learning:  No barrier    Communication Issues:  Patient appears to be able to clearly communicate and understand verbal and written communication and follow directions  correctly.    Chart Review: Chart Review, Brief history including review of medical and/or therapy records relating to the presenting problem and Simple history review with patient    Identified Performance Deficits: bathing/showering, toileting, dressing, feeding, functional mobility, hygiene and grooming, driving and community mobility, health management and maintenance, home establishment and management, meal preparation and cleanup, shopping, sleep, volunteer activities and leisure activities    Assessment of Occupational Performance:  5 or more Performance Deficits    Clinical Decision Making (Complexity): Low complexity    Treatment Explanation:  The following has been discussed with the patient:    RX ordered/plan of care  Anticipated outcomes  Possible risks and side effects    Plan:  Frequency:  1 X week, once daily  Duration:  for 8 weeks    Treatment Plan:    Modalities:    US   Therapeutic Exercise:    AROM, AAROM, PROM, Tendon Gliding, Isotonics and Isometrics  Therapeutic Activities:   Functional activities   Neuromuscular re-ed:   Nerve Gliding, Coordination/Dexterity, Kinesthetic Training, Posture, Kinesiotaping, Strain Counter Strain and Stabilization  Manual Techniques:   Coordination/Dexterity, Scar mobilization, Myofascial release and Manual edema mobilization  Orthotic Fabrication:    Static and Forearm based  Self Care:    Self Care Tasks and Ergonomic Considerations    Discharge Plan:    Achieve all LTG.  Independent in home treatment program.  Reach maximal therapeutic benefit.    Home Exercise Program:  Wrist Stabilization Alternate Pull Backs  EMR Notes  HEP - Sets  Reps  Sessions per day  Notes Work up to 50 reps over the course of the day, hold for 5 sec  Nerve Gliding Proximal Median  EMR Notes  HEP - Sets 1  Reps 10  Sessions per day 2  Notes Hold 5 sec  Hand Strengthening Gripping  EMR Notes  HEP - Sets 1  Reps until mild fatigue  Sessions per day 2  Notes  Intrinsic Strengthening Hook  Fist  EMR Notes  HEP - Sets 1  Reps until mild fatigue  Sessions per day  Notes 2x/day  Thumb Strengthening Palmar Abduction  EMR Notes  HEP - Sets 1  Reps until mild fatigue  Sessions per day  Notes 2x/day    Next Visit:  A/AA/PROM  Scar mobilization  Wrist stability exercises

## 2022-01-12 ENCOUNTER — LAB (OUTPATIENT)
Dept: LAB | Facility: CLINIC | Age: 72
End: 2022-01-12
Payer: COMMERCIAL

## 2022-01-12 ENCOUNTER — PRE VISIT (OUTPATIENT)
Dept: ORTHOPEDICS | Facility: CLINIC | Age: 72
End: 2022-01-12

## 2022-01-12 ENCOUNTER — OFFICE VISIT (OUTPATIENT)
Dept: ORTHOPEDICS | Facility: CLINIC | Age: 72
End: 2022-01-12
Payer: COMMERCIAL

## 2022-01-12 VITALS — BODY MASS INDEX: 26.06 KG/M2 | WEIGHT: 166 LBS | HEIGHT: 67 IN

## 2022-01-12 DIAGNOSIS — E03.4 ATROPHY OF THYROID (ACQUIRED): ICD-10-CM

## 2022-01-12 DIAGNOSIS — M48.062 SPINAL STENOSIS OF LUMBAR REGION WITH NEUROGENIC CLAUDICATION: ICD-10-CM

## 2022-01-12 DIAGNOSIS — M80.08XG PATHOLOGICAL FRACTURE OF VERTEBRA DUE TO AGE-RELATED OSTEOPOROSIS WITH DELAYED HEALING, SUBSEQUENT ENCOUNTER: ICD-10-CM

## 2022-01-12 DIAGNOSIS — M80.08XG: Primary | ICD-10-CM

## 2022-01-12 DIAGNOSIS — M80.08XA AGE-RELATED OSTEOPOROSIS WITH CURRENT PATHOLOGICAL FRACTURE, VERTEBRA(E), INITIAL ENCOUNTER FOR FRACTURE (H): ICD-10-CM

## 2022-01-12 LAB
ALBUMIN SERPL-MCNC: 4.3 G/DL (ref 3.4–5)
ALP SERPL-CCNC: 114 U/L (ref 40–150)
ALT SERPL W P-5'-P-CCNC: 24 U/L (ref 0–50)
ANION GAP SERPL CALCULATED.3IONS-SCNC: 9 MMOL/L (ref 3–14)
AST SERPL W P-5'-P-CCNC: 16 U/L (ref 0–45)
BILIRUB SERPL-MCNC: 0.4 MG/DL (ref 0.2–1.3)
BUN SERPL-MCNC: 19 MG/DL (ref 7–30)
CALCIUM SERPL-MCNC: 9.7 MG/DL (ref 8.5–10.1)
CHLORIDE BLD-SCNC: 108 MMOL/L (ref 94–109)
CO2 SERPL-SCNC: 27 MMOL/L (ref 20–32)
CREAT SERPL-MCNC: 0.63 MG/DL (ref 0.52–1.04)
DEPRECATED CALCIDIOL+CALCIFEROL SERPL-MC: 87 UG/L (ref 20–75)
ERYTHROCYTE [DISTWIDTH] IN BLOOD BY AUTOMATED COUNT: 14.8 % (ref 10–15)
GFR SERPL CREATININE-BSD FRML MDRD: >90 ML/MIN/1.73M2
GLUCOSE BLD-MCNC: 96 MG/DL (ref 70–99)
HCT VFR BLD AUTO: 38.7 % (ref 35–47)
HGB BLD-MCNC: 11.8 G/DL (ref 11.7–15.7)
MAGNESIUM SERPL-MCNC: 2.6 MG/DL (ref 1.6–2.3)
MCH RBC QN AUTO: 27.3 PG (ref 26.5–33)
MCHC RBC AUTO-ENTMCNC: 30.5 G/DL (ref 31.5–36.5)
MCV RBC AUTO: 89 FL (ref 78–100)
PHOSPHATE SERPL-MCNC: 3.7 MG/DL (ref 2.5–4.5)
PLATELET # BLD AUTO: 359 10E3/UL (ref 150–450)
POTASSIUM BLD-SCNC: 4 MMOL/L (ref 3.4–5.3)
PROT SERPL-MCNC: 7.7 G/DL (ref 6.8–8.8)
PTH-INTACT SERPL-MCNC: 25 PG/ML (ref 18–80)
RBC # BLD AUTO: 4.33 10E6/UL (ref 3.8–5.2)
SODIUM SERPL-SCNC: 144 MMOL/L (ref 133–144)
TSH SERPL DL<=0.005 MIU/L-ACNC: 1.54 MU/L (ref 0.4–4)
WBC # BLD AUTO: 7 10E3/UL (ref 4–11)

## 2022-01-12 PROCEDURE — 84443 ASSAY THYROID STIM HORMONE: CPT | Performed by: PATHOLOGY

## 2022-01-12 PROCEDURE — 84100 ASSAY OF PHOSPHORUS: CPT | Performed by: PATHOLOGY

## 2022-01-12 PROCEDURE — 36415 COLL VENOUS BLD VENIPUNCTURE: CPT | Performed by: PATHOLOGY

## 2022-01-12 PROCEDURE — 99214 OFFICE O/P EST MOD 30 MIN: CPT | Performed by: FAMILY MEDICINE

## 2022-01-12 PROCEDURE — 85027 COMPLETE CBC AUTOMATED: CPT | Performed by: PATHOLOGY

## 2022-01-12 PROCEDURE — 82306 VITAMIN D 25 HYDROXY: CPT | Performed by: FAMILY MEDICINE

## 2022-01-12 PROCEDURE — 83970 ASSAY OF PARATHORMONE: CPT | Performed by: FAMILY MEDICINE

## 2022-01-12 PROCEDURE — 83735 ASSAY OF MAGNESIUM: CPT | Performed by: PATHOLOGY

## 2022-01-12 PROCEDURE — 80053 COMPREHEN METABOLIC PANEL: CPT | Performed by: PATHOLOGY

## 2022-01-12 ASSESSMENT — MIFFLIN-ST. JEOR: SCORE: 1310.6

## 2022-01-12 NOTE — PROGRESS NOTES
SUBJECTIVE:    Nadira Perez is a 71 year old female here today to discuss osteoporosis.  Retired , [place], medical ICU nurse. Prol[employer] or Forte[employer].  On Fosamax in the past.  Surgery with Dr. Ramirez L5-S1 fusion. Sacral insufficiency fractures after falling post surgery. Sent to bone health for Monitoring and/or bone health consult. Previous DEXA done 11/5/21.  Breast cancer history with radiation, bilateral carpal tunnel. 8 kidney stones, hyperparathyroid- leading to surgery and thyroid surgery, Zometa in Nov 2021, stopped DEXAs, back issues.  Fell about 4.5 weeks after fusion, sacral fractures.  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  Off pain meds for 1.5 weeks.  Taking meds prn.  This week has made improvements.  Left leg sciatica- didn't know the left leg didn't , had walker so not fully falling from a standing position.  Stopped Arimidex and DXA improved, was getting Zometa q6 months  Balance issues, Neurology, motion sensitivity  Nausea insistently with head movements, just started hand therapy this week.  Peripheral neuropathy- no improvement   T-score showed her to be Osteopenia.  Risk factors for osteroporosis include postmenopausal,  or , hyperparathyroid, breast Cancer and kidney stones.  Fell breaking right 5th finger, toe fractures in the past  Athlete and grew up with 4 brothers- twin is a boy  Mom- no hip fracture  Crumbled vertebrae she was told in the past  Instructed not to take calcium supplement, vit D 6000 international unit(s) daily - in normal limits as checks 1-2 years  Calcium around 9, never higher than normal  No MI or stroke, low potassium caused some premature beats  Lymphedema left arm  Last injection Nov 2021- Zometa    Osteoporosis Risk Score/FRAX score    [email or web address]  Risk of Hip Fracture:  (Significant if >3%)  Risk of Overall Fracture:  (Significant if >20%)    Past Medical History:   Diagnosis Date     Arthritis      Bone  disease      Breast cancer (H)      H/O kyphoplasty      Hearing problem      History of kidney stones      History of radiation therapy      Hyperlipemia      Hypertension      Hypopotassemia      Kidney problem      Lymph edema      Medullary sponge kidney      Osteopenia      PONV (postoperative nausea and vomiting)      Reduced vision      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     Thyroid disease        Past Surgical History:   Procedure Laterality Date     ABDOMEN SURGERY      ovarian cyst, mesh     ARTHRODESIS WRIST Right      ARTHRODESIS WRIST  02/14/2013    Procedure: ARTHRODESIS WRIST;  left wrist scaphoid excision, four bone fusion, iliac crest bone graft  ( Mac with block);  Surgeon: Av Mendez MD;  [facility]     BIOPSY      skin, vaginal     CATARACT IOL, RT/LT Right 03/13/2018     CATARACT IOL, RT/LT Left 02/20/2018     COLONOSCOPY  12/24/2013    Procedure: COMBINED COLONOSCOPY, SINGLE BIOPSY/POLYPECTOMY BY BIOPSY;  COLONOSCOPY;  Surgeon: Dom Alvarez MD;  Location:  GI     ESOPHAGOSCOPY, GASTROSCOPY, DUODENOSCOPY (EGD), COMBINED N/A 11/23/2016    Procedure: COMBINED ESOPHAGOSCOPY, GASTROSCOPY, DUODENOSCOPY (EGD);  Surgeon: Quinten Feliciano MD;  Location:  GI     EXTERNAL EAR SURGERY      right     EYE SURGERY      radial keratomy     FUSION, SPINE, INTERBODY, OBLIQUE ANTERIOR AND LUMBAR, 2 LEVELS, POST APPROACH, USING OTS N/A 11/18/2021    Procedure: Part 1: Oblique Anterior Interbody Fusion at Lumbar 5 to sacral 1 with use of Bone Morphogenic Protein,;  Surgeon: Serge Ramirez MD;  Location: [facility]     GRAFT BONE FROM ILIAC CREST  02/14/2013    Procedure: GRAFT BONE FROM ILIAC CREST;  mac with block and local infilitration;  Surgeon: Av Mendez MD;  [facility]: [facility]     HC BREATH HYDROGEN TEST N/A 10/14/2016    Procedure: HYDROGEN BREATH TEST;  Surgeon: Cheri Barron MD;  Location:  GI     HERNIA REPAIR      umbilical  age 18 mos.     HYSTERECTOMY TOTAL ABDOMINAL  05/03/2000     MASTECTOMY MODIFIED RADICAL Bilateral     bilateral; right breast prophylactic     OPTICAL TRACKING SYSTEM FUSION POSTERIOR SPINE LUMBAR N/A 11/18/2021    Procedure: Open Posterior Instrumented Spinal Fusion at Lumbar 5 to Sacral 1, with grimm aguayo osteotomy, use of O-Arm/Stealth;  Surgeon: Serge Ramirez MD;  [facility]: [facility]     PARATHYROIDECTOMY  09/23/2004    R SUPERIOR     PARATHYROIDECTOMY  09/23/2004    parathyroid resection, subtotal     RELEASE CARPAL TUNNEL Right 12/2/2021    Procedure: RIGHT CARPAL TUNNEL RELEASE, RIGHT WRIST HARDWARE REMOVAL, RIGHT CARPAL BOSS EXCISION;  Surgeon: Rolan Conley MD;  Location: [facility] OR     REMOVE HARDWARE HAND  09/24/2013    Procedure: REMOVE HARDWARE HAND;  Left Hand Screw Removal        RHINOPLASTY  1968     thyr proc skin closed cosmetic manner by subcuticular stitch  01/23/2009     THYROPLASTY  10/09/2009     TONSILLECTOMY  1977     WRIST SURGERY      wrist arthrodesis       Current Outpatient Medications   Medication Sig Dispense Refill     acetaminophen (TYLENOL) 325 MG tablet Take 2 tablets (650 mg) by mouth every 4 hours as needed for other 60 tablet 0     amLODIPine (NORVASC) 10 MG tablet Take 1 tablet (10 mg) by mouth daily (Patient taking differently: Take 10 mg by mouth daily Takes around 10 AM.) 90 tablet 3     Ascorbic Acid (VITAMIN C) 500 MG CHEW Take 1 tablet by mouth daily       atorvastatin (LIPITOR) 80 MG tablet Take 1 tablet (80 mg) by mouth daily (Patient taking differently: Take 80 mg by mouth every evening ) 90 tablet 3     Cholecalciferol (VITAMIN D3) 50 MCG (2000 UT) CAPS Take 6,000 Units by mouth daily (Patient taking differently: Take 6,000 Units by mouth daily (with lunch) ) 270 capsule 3     CRANBERRY EXTRACT PO Take 650 mg by mouth daily ~10 am  Takes 2       diclofenac (VOLTAREN) 1 % topical gel APPLY 4 GRAMS TO KNEES OR 2 GRAMS TO HANDS FOUR TIMES DAILY USING ENCLOSED  DOSING CARD. 100 g 3     gabapentin (NEURONTIN) 300 MG capsule Take 1-2 capsules by mouth every 8 hours (Patient taking differently: Take 600 mg by mouth 3 times daily ) 540 capsule 3     hydrOXYzine (ATARAX) 10 MG tablet Take 1 tablet (10 mg) by mouth 2 times daily as needed for itching (pain adjuvant) 30 tablet 0     levothyroxine (SYNTHROID/LEVOTHROID) 112 MCG tablet TAKE 1/2 TABLET BY MOUTH DAILY 45 tablet 3     lisinopril (ZESTRIL) 40 MG tablet Take 1 tablet (40 mg) by mouth daily (Patient taking differently: Take 20 mg by mouth 2 times daily Takes 1/2 tablet at 2 PM and the other 1/2 tablet around 10 PM) 90 tablet 3     melatonin 5 MG tablet Take 10 mg by mouth At Bedtime        methocarbamol (ROBAXIN) 500 MG tablet Take 1 tablet (500 mg) by mouth 3 times daily 40 tablet 0     metoprolol succinate ER (TOPROL-XL) 50 MG 24 hr tablet Take 1 tablet (50 mg) by mouth daily (Patient taking differently: Take 50 mg by mouth every morning ) 90 tablet 3     Multiple Vitamin (MULTI-VITAMIN) per tablet Take 1 tablet by mouth daily (with lunch)        ondansetron (ZOFRAN-ODT) 4 MG ODT tab Take 1 tablet (4 mg) by mouth every 12 hours as needed for nausea 180 tablet 3     polyethylene glycol (MIRALAX) 17 g packet Take 17 g by mouth 2 times daily (Patient taking differently: Take 17 g by mouth 2 times daily as needed ) 7 packet 0     senna-docusate (SENOKOT-S/PERICOLACE) 8.6-50 MG tablet Take 2 tablets by mouth 2 times daily 30 tablet 0     spironolactone (ALDACTONE) 25 MG tablet Take 1 tablet (25 mg) by mouth daily 90 tablet 3     terbinafine (LAMISIL AT) 1 % external cream Apply topically 2 times daily For fungal infection not resolved with other antifungals (e.g. Clotrimazole) (Patient taking differently: Apply topically 2 times daily as needed (toenail fungus) For fungal infection not resolved with other antifungals (e.g. Clotrimazole)) 24 g 11     triamcinolone (KENALOG) 0.1 % external ointment Apply topically 2 times  daily (Patient taking differently: Apply topically 2 times daily as needed ) 30 g 11     Vaginal Lubricant (REPHRESH) GEL Place 2 g vaginally every 3 days 2 g 3     ibuprofen (ADVIL/MOTRIN) 600 MG tablet Take 1 tablet (600 mg) by mouth every 6 hours as needed for other (mild and/or inflammatory pain) (Patient not taking: Reported on 12/17/2021) 30 tablet 0     methylPREDNISolone (MEDROL DOSEPAK) 4 MG tablet therapy pack Follow Package Directions 21 tablet 0     triamterene-HCTZ (MAXZIDE-25) 37.5-25 MG tablet Take 1 tablet by mouth daily (Patient not taking: Reported on 12/17/2021) 90 tablet 3       Family History   Problem Relation Age of Onset     Neurologic Disorder Mother         Anuerysm of Cerebral Artery, Dementia     Diabetes Mother      Thyroid Disease Mother         ,     Cerebrovascular Disease Mother      Dementia Mother      Osteoporosis Mother      Heart Disease Father         AAA     Hypertension Father      Circulatory Brother         Perihperal Neurophathy     Diabetes Maternal Grandmother      Asthma Maternal Grandmother      Chronic Obstructive Pulmonary Disease Maternal Grandfather         father     Asthma Maternal Grandfather      Diabetes Maternal Aunt         x2     Melanoma Maternal Aunt      Glaucoma Maternal Aunt      Breast Cancer Cousin      Dementia Other      Cancer Other         malignant melanoma     Hypertension Other      Hypertension Other      Cerebrovascular Disease Other      Cerebrovascular Disease Other      Obesity Other      Respiratory Other      Cancer Other      Diabetes Other      Asthma Other      Macular Degeneration No family hx of      Coronary Artery Disease No family hx of      Hyperlipidemia No family hx of      Kidney Disease No family hx of      Thrombosis No family hx of      Arthritis No family hx of      Depression No family hx of      Mental Illness No family hx of      Substance Abuse No family hx of      Cystic Fibrosis No family hx of      Early Death No  family hx of      Coronary Artery Disease Early Onset No family hx of      Heart Failure No family hx of      Bleeding Diathesis No family hx of      Ovarian Cancer No family hx of      Uterine Cancer No family hx of      Prostate Cancer No family hx of      Colorectal Cancer No family hx of      Pancreatic Cancer No family hx of      Lung Cancer No family hx of      Other Cancer No family hx of      Autoimmune Disease No family hx of      Unknown/Adopted No family hx of      Genetic Disorder No family hx of      Bleeding Disorder No family hx of      Clotting Disorder No family hx of      Anesthesia Reaction No family hx of        Social History     Socioeconomic History     Marital status:      Spouse name: Not on file     Number of children: Not on file     Years of education: Not on file     Highest education level: Not on file   Occupational History     Not on file   Tobacco Use     Smoking status: Never Smoker     Smokeless tobacco: Never Used   Substance and Sexual Activity     Alcohol use: Not Currently     Alcohol/week: 7.0 standard drinks     Types: 7 Glasses of wine per week     Comment: Rare to occasional     Drug use: Yes     Types: Marijuana     Sexual activity: Not Currently     Partners: Male     Birth control/protection: Abstinence   Other Topics Concern      Service No     Blood Transfusions Not Asked     Caffeine Concern No     Occupational Exposure No     Hobby Hazards No     Sleep Concern No     Stress Concern No     Weight Concern No     Special Diet No     Back Care No     Exercise Yes     Comment: walks 4-6x  week for 20-30 min. each     Bike Helmet Not Asked     Seat Belt Yes     Self-Exams Yes     Parent/sibling w/ CABG, MI or angioplasty before 65F 55M? No   Social History Narrative    .  Recently retired. 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        She lives with a renter.         She  "exercises 50 minutes three times a week.     Social Determinants of Health     Financial Resource Strain: Not on file   Food Insecurity: Not on file   Transportation Needs: Not on file   Physical Activity: Not on file   Stress: Not on file   Social Connections: Not on file   Intimate Partner Violence: Not on file   Housing Stability: Not on file       OBJECTIVE:  Ht 1.702 m (5' 7\")   Wt 75.3 kg (166 lb)   BMI 26.00 kg/m    There has been a change in patients height.  5'9\" now 5'7\"    ASSESSMENT:  Osteoporosis  Fusion L5-S1- S1 segment with extension sacral ala   Complex hx of radiation for breast cancer, hyperparathyroidism, kidney stones, exostosis in mouth, parathyroid and thyroid surgeries  PLAN:  Pt is not a candidate for Forteo or Tymlos due to past radiation.  Discussion with Dr. Saloni XIONG placed to see if this could be an option  We have sent off the labs and further evaluation will be required prior to final bone health med decision    The importance of calcium and vitamin D in bone health was discussed in detail. A calcium intake of 1500 mg total per day in divided doses, to include diet and supplements, was recommended.  I have urged the patient to obtain as much as the recommended amount of calcium as possible from the diet.  I recommended use of the [employer] Calcium Calculator to get an estimate of daily total intake in the diet (www.iofcalciumcalculator).800-1000 international units vitamin D daily was also recommended.       We also discussed the role of weight bearing exercise for the treatment of bone loss. I have also recommended weight training at a minimum of 2x/week, but only after she is cleared for more movement after back surgery.  Body weight movement at home, stretching, no PT as hasn't been approved       Cortney Clark MD, CAQ SM  Sports Medicine and Bone Health        "

## 2022-01-12 NOTE — LETTER
1/12/2022      RE: Nadira Perez  [id number]  [facility] [id number]       SUBJECTIVE:    Nadira Perez is a 71 year old female here today to discuss osteoporosis.  Retired , [place], medical ICU nurse. Prolia or Forteo.  On Fosamax in the past.  Surgery with Dr. Ramirez L5-S1 fusion. Sacral insufficiency fractures after falling post surgery. Sent to bone health for Monitoring and/or bone health consult. Previous DEXA done 11/5/21.  Breast cancer history with radiation, bilateral carpal tunnel. 8 kidney stones, hyperparathyroid- leading to surgery and thyroid surgery, Zometa in Nov 2021, stopped DEXAs, back issues.  Fell about 4.5 weeks after fusion, sacral fractures.  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  Off pain meds for 1.5 weeks.  Taking meds prn.  This week has made improvements.  Left leg sciatica- didn't know the left leg didn't , had walker so not fully falling from a standing position.  Stopped Arimidex and DXA improved, was getting Zometa q6 months  Balance issues, Neurology, motion sensitivity  Nausea insistently with head movements, just started hand therapy this week.  Peripheral neuropathy- no improvement   T-score showed her to be Osteopenia.  Risk factors for osteroporosis include postmenopausal,  or , hyperparathyroid, breast Cancer and kidney stones.  Fell breaking right 5th finger, toe fractures in the past  Athlete and grew up with 4 brothers- twin is a boy  Mom- no hip fracture  Crumbled vertebrae she was told in the past  Instructed not to take calcium supplement, vit D 6000 international unit(s) daily - in normal limits as checks 1-2 years  Calcium around 9, never higher than normal  No MI or stroke, low potassium caused some premature beats  Lymphedema left arm  Last injection Nov 2021- Zometa    Osteoporosis Risk Score/FRAX score    [email or web address]  Risk of Hip Fracture:  (Significant if >3%)  Risk of Overall Fracture:   (Significant if >20%)    Past Medical History:   Diagnosis Date     Arthritis      Bone disease      Breast cancer (H)      H/O kyphoplasty      Hearing problem      History of kidney stones      History of radiation therapy      Hyperlipemia      Hypertension      Hypopotassemia      Kidney problem      Lymph edema      Medullary sponge kidney      Osteopenia      PONV (postoperative nausea and vomiting)      Reduced vision      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     Thyroid disease        Past Surgical History:   Procedure Laterality Date     ABDOMEN SURGERY      ovarian cyst, mesh     ARTHRODESIS WRIST Right      ARTHRODESIS WRIST  02/14/2013    Procedure: ARTHRODESIS WRIST;  left wrist scaphoid excision, four bone fusion, iliac crest bone graft  ( Mac with block);  Surgeon: Av Mendez MD;  [facility]     BIOPSY      skin, vaginal     CATARACT IOL, RT/LT Right 03/13/2018     CATARACT IOL, RT/LT Left 02/20/2018     COLONOSCOPY  12/24/2013    Procedure: COMBINED COLONOSCOPY, SINGLE BIOPSY/POLYPECTOMY BY BIOPSY;  COLONOSCOPY;  Surgeon: Dom Alvarez MD;  Location:  GI     ESOPHAGOSCOPY, GASTROSCOPY, DUODENOSCOPY (EGD), COMBINED N/A 11/23/2016    Procedure: COMBINED ESOPHAGOSCOPY, GASTROSCOPY, DUODENOSCOPY (EGD);  Surgeon: Quinten Feliciano MD;  [facility]     EXTERNAL EAR SURGERY      right     EYE SURGERY      radial keratomy     FUSION, SPINE, INTERBODY, OBLIQUE ANTERIOR AND LUMBAR, 2 LEVELS, POST APPROACH, USING OTS N/A 11/18/2021    Procedure: Part 1: Oblique Anterior Interbody Fusion at Lumbar 5 to sacral 1 with use of Bone Morphogenic Protein,;  Surgeon: Serge Ramirez MD;  Location: [facility]     GRAFT BONE FROM ILIAC CREST  02/14/2013    Procedure: GRAFT BONE FROM ILIAC CREST;  mac with block and local infilitration;  Surgeon: Av Mendez MD;  [facility]     HC BREATH HYDROGEN TEST N/A 10/14/2016    Procedure: HYDROGEN BREATH TEST;   Surgeon: Cheri Barron MD;  Location: [facility]     HERNIA REPAIR      umbilical age 18 mos.     HYSTERECTOMY TOTAL ABDOMINAL  05/03/2000     MASTECTOMY MODIFIED RADICAL Bilateral     bilateral; right breast prophylactic     OPTICAL TRACKING SYSTEM FUSION POSTERIOR SPINE LUMBAR N/A 11/18/2021    Procedure: Open Posterior Instrumented Spinal Fusion at Lumbar 5 to Sacral 1, with grimm aguayo osteotomy, use of O-Arm/Stealth;  Surgeon: Serge Ramirez MD;  [facility]: [facility]     PARATHYROIDECTOMY  09/23/2004    R SUPERIOR     PARATHYROIDECTOMY  09/23/2004    parathyroid resection, subtotal     RELEASE CARPAL TUNNEL Right 12/2/2021    Procedure: RIGHT CARPAL TUNNEL RELEASE, RIGHT WRIST HARDWARE REMOVAL, RIGHT CARPAL BOSS EXCISION;  Surgeon: Rolan Conley MD;  Location: [facility] OR     REMOVE HARDWARE HAND  09/24/2013    Procedure: REMOVE HARDWARE HAND;  Left Hand Screw Removal        RHINOPLASTY  1968     thyr proc skin closed cosmetic manner by subcuticular stitch  01/23/2009     THYROPLASTY  10/09/2009     TONSILLECTOMY  1977     WRIST SURGERY      wrist arthrodesis       Current Outpatient Medications   Medication Sig Dispense Refill     acetaminophen (TYLENOL) 325 MG tablet Take 2 tablets (650 mg) by mouth every 4 hours as needed for other 60 tablet 0     amLODIPine (NORVASC) 10 MG tablet Take 1 tablet (10 mg) by mouth daily (Patient taking differently: Take 10 mg by mouth daily Takes around 10 AM.) 90 tablet 3     Ascorbic Acid (VITAMIN C) 500 MG CHEW Take 1 tablet by mouth daily       atorvastatin (LIPITOR) 80 MG tablet Take 1 tablet (80 mg) by mouth daily (Patient taking differently: Take 80 mg by mouth every evening ) 90 tablet 3     Cholecalciferol (VITAMIN D3) 50 MCG (2000 UT) CAPS Take 6,000 Units by mouth daily (Patient taking differently: Take 6,000 Units by mouth daily (with lunch) ) 270 capsule 3     CRANBERRY EXTRACT PO Take 650 mg by mouth daily ~10 am  Takes 2       diclofenac  (VOLTAREN) 1 % topical gel APPLY 4 GRAMS TO KNEES OR 2 GRAMS TO HANDS FOUR TIMES DAILY USING ENCLOSED DOSING CARD. 100 g 3     gabapentin (NEURONTIN) 300 MG capsule Take 1-2 capsules by mouth every 8 hours (Patient taking differently: Take 600 mg by mouth 3 times daily ) 540 capsule 3     hydrOXYzine (ATARAX) 10 MG tablet Take 1 tablet (10 mg) by mouth 2 times daily as needed for itching (pain adjuvant) 30 tablet 0     levothyroxine (SYNTHROID/LEVOTHROID) 112 MCG tablet TAKE 1/2 TABLET BY MOUTH DAILY 45 tablet 3     lisinopril (ZESTRIL) 40 MG tablet Take 1 tablet (40 mg) by mouth daily (Patient taking differently: Take 20 mg by mouth 2 times daily Takes 1/2 tablet at 2 PM and the other 1/2 tablet around 10 PM) 90 tablet 3     melatonin 5 MG tablet Take 10 mg by mouth At Bedtime        methocarbamol (ROBAXIN) 500 MG tablet Take 1 tablet (500 mg) by mouth 3 times daily 40 tablet 0     metoprolol succinate ER (TOPROL-XL) 50 MG 24 hr tablet Take 1 tablet (50 mg) by mouth daily (Patient taking differently: Take 50 mg by mouth every morning ) 90 tablet 3     Multiple Vitamin (MULTI-VITAMIN) per tablet Take 1 tablet by mouth daily (with lunch)        ondansetron (ZOFRAN-ODT) 4 MG ODT tab Take 1 tablet (4 mg) by mouth every 12 hours as needed for nausea 180 tablet 3     polyethylene glycol (MIRALAX) 17 g packet Take 17 g by mouth 2 times daily (Patient taking differently: Take 17 g by mouth 2 times daily as needed ) 7 packet 0     senna-docusate (SENOKOT-S/PERICOLACE) 8.6-50 MG tablet Take 2 tablets by mouth 2 times daily 30 tablet 0     spironolactone (ALDACTONE) 25 MG tablet Take 1 tablet (25 mg) by mouth daily 90 tablet 3     terbinafine (LAMISIL AT) 1 % external cream Apply topically 2 times daily For fungal infection not resolved with other antifungals (e.g. Clotrimazole) (Patient taking differently: Apply topically 2 times daily as needed (toenail fungus) For fungal infection not resolved with other antifungals (e.g.  Clotrimazole)) 24 g 11     triamcinolone (KENALOG) 0.1 % external ointment Apply topically 2 times daily (Patient taking differently: Apply topically 2 times daily as needed ) 30 g 11     Vaginal Lubricant (REPHRESH) GEL Place 2 g vaginally every 3 days 2 g 3     ibuprofen (ADVIL/MOTRIN) 600 MG tablet Take 1 tablet (600 mg) by mouth every 6 hours as needed for other (mild and/or inflammatory pain) (Patient not taking: Reported on 12/17/2021) 30 tablet 0     methylPREDNISolone (MEDROL DOSEPAK) 4 MG tablet therapy pack Follow Package Directions 21 tablet 0     triamterene-HCTZ (MAXZIDE-25) 37.5-25 MG tablet Take 1 tablet by mouth daily (Patient not taking: Reported on 12/17/2021) 90 tablet 3       Family History   Problem Relation Age of Onset     Neurologic Disorder Mother         Anuerysm of Cerebral Artery, Dementia     Diabetes Mother      Thyroid Disease Mother         ,     Cerebrovascular Disease Mother      Dementia Mother      Osteoporosis Mother      Heart Disease Father         AAA     Hypertension Father      Circulatory Brother         Perihperal Neurophathy     Diabetes Maternal Grandmother      Asthma Maternal Grandmother      Chronic Obstructive Pulmonary Disease Maternal Grandfather         father     Asthma Maternal Grandfather      Diabetes Maternal Aunt         x2     Melanoma Maternal Aunt      Glaucoma Maternal Aunt      Breast Cancer Cousin      Dementia Other      Cancer Other         malignant melanoma     Hypertension Other      Hypertension Other      Cerebrovascular Disease Other      Cerebrovascular Disease Other      Obesity Other      Respiratory Other      Cancer Other      Diabetes Other      Asthma Other      Macular Degeneration No family hx of      Coronary Artery Disease No family hx of      Hyperlipidemia No family hx of      Kidney Disease No family hx of      Thrombosis No family hx of      Arthritis No family hx of      Depression No family hx of      Mental Illness No family  hx of      Substance Abuse No family hx of      Cystic Fibrosis No family hx of      Early Death No family hx of      Coronary Artery Disease Early Onset No family hx of      Heart Failure No family hx of      Bleeding Diathesis No family hx of      Ovarian Cancer No family hx of      Uterine Cancer No family hx of      Prostate Cancer No family hx of      Colorectal Cancer No family hx of      Pancreatic Cancer No family hx of      Lung Cancer No family hx of      Other Cancer No family hx of      Autoimmune Disease No family hx of      Unknown/Adopted No family hx of      Genetic Disorder No family hx of      Bleeding Disorder No family hx of      Clotting Disorder No family hx of      Anesthesia Reaction No family hx of        Social History     Socioeconomic History     Marital status:      Spouse name: Not on file     Number of children: Not on file     Years of education: Not on file     Highest education level: Not on file   Occupational History     Not on file   Tobacco Use     Smoking status: Never Smoker     Smokeless tobacco: Never Used   Substance and Sexual Activity     Alcohol use: Not Currently     Alcohol/week: 7.0 standard drinks     Types: 7 Glasses of wine per week     Comment: Rare to occasional     Drug use: Yes     Types: Marijuana     Sexual activity: Not Currently     Partners: Male     Birth control/protection: Abstinence   Other Topics Concern      Service No     Blood Transfusions Not Asked     Caffeine Concern No     Occupational Exposure No     Hobby Hazards No     Sleep Concern No     Stress Concern No     Weight Concern No     Special Diet No     Back Care No     Exercise Yes     Comment: walks 4-6x  week for 20-30 min. each     Bike Helmet Not Asked     Seat Belt Yes     Self-Exams Yes     Parent/sibling w/ CABG, MI or angioplasty before 65F 55M? No   Social History Narrative    .  Recently retired. 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        She lives with a renter.         She exercises 50 minutes three times a week.     Social Determinants of Health     Financial Resource Strain: Not on file   Food Insecurity: Not on file   Transportation Needs: Not on file   Physical Activity: Not on file   Stress: Not on file   Social Connections: Not on file   Intimate Partner Violence: Not on file   Housing Stability: Not on file       OBJECTIVE:  Ht 1.702 m (5' 7\")   Wt 75.3 kg (166 lb)   BMI 26.00 kg/m    There has been a change in patients height.  5'9\" now 5'7\"    ASSESSMENT:  Osteoporosis  Fusion L5-S1- S1 segment with extension sacral ala   Complex hx of radiation for breast cancer, hyperparathyroidism, kidney stones, exostosis in mouth, parathyroid and thyroid surgeries  PLAN:  Pt is not a candidate for Forteo or Tymlos due to past radiation.  Discussion with Dr. Saloni shelton to see if this could be an option  We have sent off the labs and further evaluation will be required prior to final bone health med decision    The importance of calcium and vitamin D in bone health was discussed in detail. A calcium intake of 1500 mg total per day in divided doses, to include diet and supplements, was recommended.  I have urged the patient to obtain as much as the recommended amount of calcium as possible from the diet.  I recommended use of the International Osteoporosis Foundation Calcium Calculator to get an estimate of daily total intake in the diet (www.iofcalciumcalculator).800-1000 international units vitamin D daily was also recommended.       We also discussed the role of weight bearing exercise for the treatment of bone loss. I have also recommended weight training at a minimum of 2x/week, but only after she is cleared for more movement after back surgery.  Body weight movement at home, stretching, no PT as hasn't been approved       Cortney Clark MD, CAQ   Sports Medicine " and Bone Health

## 2022-01-13 ENCOUNTER — TELEPHONE (OUTPATIENT)
Dept: ORTHOPEDICS | Facility: CLINIC | Age: 72
End: 2022-01-13

## 2022-01-13 ENCOUNTER — OFFICE VISIT (OUTPATIENT)
Dept: INTERNAL MEDICINE | Facility: CLINIC | Age: 72
End: 2022-01-13
Payer: COMMERCIAL

## 2022-01-13 ENCOUNTER — TELEPHONE (OUTPATIENT)
Dept: ORTHOPEDICS | Facility: CLINIC | Age: 72
End: 2022-01-13
Payer: COMMERCIAL

## 2022-01-13 VITALS
HEART RATE: 75 BPM | WEIGHT: 157.7 LBS | RESPIRATION RATE: 16 BRPM | DIASTOLIC BLOOD PRESSURE: 80 MMHG | OXYGEN SATURATION: 100 % | BODY MASS INDEX: 24.75 KG/M2 | HEIGHT: 67 IN | SYSTOLIC BLOOD PRESSURE: 136 MMHG

## 2022-01-13 DIAGNOSIS — K43.9 VENTRAL HERNIA WITHOUT OBSTRUCTION OR GANGRENE: Primary | ICD-10-CM

## 2022-01-13 DIAGNOSIS — M25.532 LEFT WRIST PAIN: Primary | ICD-10-CM

## 2022-01-13 PROBLEM — Z98.890 S/P WRIST SURGERY: Status: ACTIVE | Noted: 2022-01-13

## 2022-01-13 PROBLEM — M79.641 PAIN IN BOTH HANDS: Status: ACTIVE | Noted: 2022-01-13

## 2022-01-13 PROBLEM — M79.642 PAIN IN BOTH HANDS: Status: ACTIVE | Noted: 2022-01-13

## 2022-01-13 PROCEDURE — 99213 OFFICE O/P EST LOW 20 MIN: CPT | Performed by: NURSE PRACTITIONER

## 2022-01-13 ASSESSMENT — MIFFLIN-ST. JEOR: SCORE: 1272.95

## 2022-01-13 NOTE — TELEPHONE ENCOUNTER
----- Message from Cortney Clark MD sent at 1/13/2022 10:12 AM CST -----  Please hold vit D as the levels are high x 4 weeks

## 2022-01-13 NOTE — TELEPHONE ENCOUNTER
Spoke with patient who is aware that her vit D levels are higher than normal which was slightly surprising for her.    She will discontinue vit D for 4 weeks and will reach back out with any questions.     - Deonte Rivera, ATC

## 2022-01-13 NOTE — NURSING NOTE
"Nadira Perez is a 71 year old female patient that presents today in clinic for the following:    Chief Complaint   Patient presents with     Hernia     Mass     The patient's allergies and medications were reviewed as noted. A set of vitals were recorded as noted without incident: /80 (BP Location: Right arm, Patient Position: Sitting, Cuff Size: Adult Regular)   Pulse 75   Resp 16   Ht 1.702 m (5' 7\")   Wt 71.5 kg (157 lb 11.2 oz)   SpO2 100%   Breastfeeding No   BMI 24.70 kg/m  . The patient was asked if they had any of the following symptoms in the last forty-eight hours: (1) fever or chills, (2) cough, (3) shortness of breath or difficulty breathing, (4) fatigue, (5) muscle or body aches, (6) headache, (7) new loss of taste or smell, (8) sore throat, (9) congestion or runny nose, (10) nausea or vomiting, and (11) diarrhea. Nadira Perez denies having any of the following symptoms in the last forty-eight hours: (1) fever or chills, (2) cough, (3) shortness of breath or difficulty breathing, (4) fatigue, (5) muscle or body aches, (6) headache, (7) new loss of taste or smell, (8) sore throat, (9) congestion or runny nose, (10) nausea or vomiting, and (11) diarrhea. The patient does not have any other questions for the provider.    HARPER Guerrero at 4:09 PM on 1/13/2022  "

## 2022-01-14 NOTE — PROGRESS NOTES
S:  Nadira Perez is a 71 year old female  Who complains of an abdominal mass.     She has noted this mass for more than three months but it is getting larger the last few weeks.  Non-painful and reducible.            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     Corneal opacity     Hypothyroidism     Osteopenia, unspecified location     Aromatase inhibitor use     Chronic pain of right knee     DDD (degenerative disc disease), lumbar     Degenerative scoliosis in adult patient     Right carpal tunnel syndrome     Peripheral neuropathy     Spinal stenosis of lumbar region with neurogenic claudication     Spondylolisthesis of lumbar region     Brain lesion     Pain in both hands     S/P wrist surgery            Past Medical History:   Diagnosis Date     Arthritis      Bone disease      Breast cancer (H)      H/O kyphoplasty      Hearing problem      History of kidney stones      History of radiation therapy      Hyperlipemia      Hypertension      Hypopotassemia      Kidney problem      Lymph edema      Medullary sponge kidney      Osteopenia      PONV (postoperative nausea and vomiting)      Reduced vision      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     Thyroid disease             Past Surgical History:   Procedure Laterality Date     ABDOMEN SURGERY      ovarian cyst, mesh     ARTHRODESIS WRIST Right      ARTHRODESIS WRIST  02/14/2013    Procedure: ARTHRODESIS WRIST;  left wrist scaphoid excision,  four bone fusion, iliac crest bone graft  ( Mac with block);  Surgeon: Av Mendez MD;  [facility]     BIOPSY      skin, vaginal     CATARACT IOL, RT/LT Right 03/13/2018     CATARACT IOL, RT/LT Left 02/20/2018     COLONOSCOPY  12/24/2013    Procedure: COMBINED COLONOSCOPY, SINGLE BIOPSY/POLYPECTOMY BY BIOPSY;  COLONOSCOPY;  Surgeon: Dom Alvarez MD;  Location:  GI     ESOPHAGOSCOPY, GASTROSCOPY, DUODENOSCOPY (EGD), COMBINED N/A 11/23/2016    Procedure: COMBINED ESOPHAGOSCOPY, GASTROSCOPY, DUODENOSCOPY (EGD);  Surgeon: Quinten Feliciano MD;  Location:  GI     EXTERNAL EAR SURGERY      right     EYE SURGERY      radial keratomy     FUSION, SPINE, INTERBODY, OBLIQUE ANTERIOR AND LUMBAR, 2 LEVELS, POST APPROACH, USING OTS N/A 11/18/2021    Procedure: Part 1: Oblique Anterior Interbody Fusion at Lumbar 5 to sacral 1 with use of Bone Morphogenic Protein,;  Surgeon: Serge Ramirez MD;  [facility]: [facility]     GRAFT BONE FROM ILIAC CREST  02/14/2013    Procedure: GRAFT BONE FROM ILIAC CREST;  mac with block and local infilitration;  Surgeon: Av Mendez MD;  [facility]     HC BREATH HYDROGEN TEST N/A 10/14/2016    Procedure: HYDROGEN BREATH TEST;  Surgeon: Cheri Barron MD;  Location:  GI     HERNIA REPAIR      umbilical age 18 mos.     HYSTERECTOMY TOTAL ABDOMINAL  05/03/2000     MASTECTOMY MODIFIED RADICAL Bilateral     bilateral; right breast prophylactic     OPTICAL TRACKING SYSTEM FUSION POSTERIOR SPINE LUMBAR N/A 11/18/2021    Procedure: Open Posterior Instrumented Spinal Fusion at Lumbar 5 to Sacral 1, with grimm aguayo osteotomy, use of O-Arm/Stealth;  Surgeon: Serge Ramirez MD;  [facility]: [facility]     PARATHYROIDECTOMY  09/23/2004    R SUPERIOR     PARATHYROIDECTOMY  09/23/2004    parathyroid resection, subtotal     RELEASE CARPAL TUNNEL Right 12/2/2021    Procedure: RIGHT CARPAL TUNNEL RELEASE, RIGHT WRIST HARDWARE REMOVAL, RIGHT  CARPAL BOSS EXCISION;  Surgeon: Rolan Conley MD;  Location: [facility] OR     REMOVE HARDWARE HAND  09/24/2013    Procedure: REMOVE HARDWARE HAND;  Left Hand Screw Removal        RHINOPLASTY  1968     thyr proc skin closed cosmetic manner by subcuticular stitch  01/23/2009     THYROPLASTY  10/09/2009     TONSILLECTOMY  1977     WRIST SURGERY      wrist arthrodesis            Social History     Tobacco Use     Smoking status: Never Smoker     Smokeless tobacco: Never Used   Substance Use Topics     Alcohol use: Not Currently     Alcohol/week: 7.0 standard drinks     Types: 7 Glasses of wine per week     Comment: Rare to occasional            Family History   Problem Relation Age of Onset     Neurologic Disorder Mother         Anuerysm of Cerebral Artery, Dementia     Diabetes Mother      Thyroid Disease Mother         ,     Cerebrovascular Disease Mother      Dementia Mother      Osteoporosis Mother      Heart Disease Father         AAA     Hypertension Father      Circulatory Brother         Perihperal Neurophathy     Diabetes Maternal Grandmother      Asthma Maternal Grandmother      Chronic Obstructive Pulmonary Disease Maternal Grandfather         father     Asthma Maternal Grandfather      Diabetes Maternal Aunt         x2     Melanoma Maternal Aunt      Glaucoma Maternal Aunt      Breast Cancer Cousin      Dementia Other      Cancer Other         malignant melanoma     Hypertension Other      Hypertension Other      Cerebrovascular Disease Other      Cerebrovascular Disease Other      Obesity Other      Respiratory Other      Cancer Other      Diabetes Other      Asthma Other      Macular Degeneration No family hx of      Coronary Artery Disease No family hx of      Hyperlipidemia No family hx of      Kidney Disease No family hx of      Thrombosis No family hx of      Arthritis No family hx of      Depression No family hx of      Mental Illness No family hx of      Substance Abuse No family hx of      Cystic Fibrosis  No family hx of      Early Death No family hx of      Coronary Artery Disease Early Onset No family hx of      Heart Failure No family hx of      Bleeding Diathesis No family hx of      Ovarian Cancer No family hx of      Uterine Cancer No family hx of      Prostate Cancer No family hx of      Colorectal Cancer No family hx of      Pancreatic Cancer No family hx of      Lung Cancer No family hx of      Other Cancer No family hx of      Autoimmune Disease No family hx of      Unknown/Adopted No family hx of      Genetic Disorder No family hx of      Bleeding Disorder No family hx of      Clotting Disorder No family hx of      Anesthesia Reaction No family hx of                Allergies   Allergen Reactions     Erythromycin Nausea     Penicillins Hives     Around age 6 - doesn't recall full reaction, mother told her it was hives.     Has tolerated cephalsporins.             Current Outpatient Medications   Medication Sig Dispense Refill     acetaminophen (TYLENOL) 325 MG tablet Take 2 tablets (650 mg) by mouth every 4 hours as needed for other 60 tablet 0     amLODIPine (NORVASC) 10 MG tablet Take 1 tablet (10 mg) by mouth daily (Patient taking differently: Take 10 mg by mouth daily Takes around 10 AM.) 90 tablet 3     Ascorbic Acid (VITAMIN C) 500 MG CHEW Take 1 tablet by mouth daily       atorvastatin (LIPITOR) 80 MG tablet Take 1 tablet (80 mg) by mouth daily (Patient taking differently: Take 80 mg by mouth every evening ) 90 tablet 3     Cholecalciferol (VITAMIN D3) 50 MCG (2000 UT) CAPS Take 6,000 Units by mouth daily (Patient taking differently: Take 6,000 Units by mouth daily (with lunch) ) 270 capsule 3     CRANBERRY EXTRACT PO Take 650 mg by mouth daily ~10 am  Takes 2       diclofenac (VOLTAREN) 1 % topical gel APPLY 4 GRAMS TO KNEES OR 2 GRAMS TO HANDS FOUR TIMES DAILY USING ENCLOSED DOSING CARD. 100 g 3     gabapentin (NEURONTIN) 300 MG capsule Take 1-2 capsules by mouth every 8 hours (Patient taking  differently: Take 600 mg by mouth 3 times daily ) 540 capsule 3     hydrOXYzine (ATARAX) 10 MG tablet Take 1 tablet (10 mg) by mouth 2 times daily as needed for itching (pain adjuvant) 30 tablet 0     levothyroxine (SYNTHROID/LEVOTHROID) 112 MCG tablet TAKE 1/2 TABLET BY MOUTH DAILY 45 tablet 3     lisinopril (ZESTRIL) 40 MG tablet Take 1 tablet (40 mg) by mouth daily (Patient taking differently: Take 20 mg by mouth 2 times daily Takes 1/2 tablet at 2 PM and the other 1/2 tablet around 10 PM) 90 tablet 3     melatonin 5 MG tablet Take 10 mg by mouth At Bedtime        methocarbamol (ROBAXIN) 500 MG tablet Take 1 tablet (500 mg) by mouth 3 times daily 40 tablet 0     metoprolol succinate ER (TOPROL-XL) 50 MG 24 hr tablet Take 1 tablet (50 mg) by mouth daily (Patient taking differently: Take 50 mg by mouth every morning ) 90 tablet 3     Multiple Vitamin (MULTI-VITAMIN) per tablet Take 1 tablet by mouth daily (with lunch)        ondansetron (ZOFRAN-ODT) 4 MG ODT tab Take 1 tablet (4 mg) by mouth every 12 hours as needed for nausea 180 tablet 3     polyethylene glycol (MIRALAX) 17 g packet Take 17 g by mouth 2 times daily (Patient taking differently: Take 17 g by mouth 2 times daily as needed ) 7 packet 0     senna-docusate (SENOKOT-S/PERICOLACE) 8.6-50 MG tablet Take 2 tablets by mouth 2 times daily 30 tablet 0     spironolactone (ALDACTONE) 25 MG tablet Take 1 tablet (25 mg) by mouth daily 90 tablet 3     terbinafine (LAMISIL AT) 1 % external cream Apply topically 2 times daily For fungal infection not resolved with other antifungals (e.g. Clotrimazole) (Patient taking differently: Apply topically 2 times daily as needed (toenail fungus) For fungal infection not resolved with other antifungals (e.g. Clotrimazole)) 24 g 11     triamcinolone (KENALOG) 0.1 % external ointment Apply topically 2 times daily (Patient taking differently: Apply topically 2 times daily as needed ) 30 g 11     Vaginal Lubricant (REPHRESH) GEL  "Place 2 g vaginally every 3 days 2 g 3     ibuprofen (ADVIL/MOTRIN) 600 MG tablet Take 1 tablet (600 mg) by mouth every 6 hours as needed for other (mild and/or inflammatory pain) (Patient not taking: Reported on 12/17/2021) 30 tablet 0        REVIEW OF SYSTEMS:  See above    O:   /80 (BP Location: Right arm, Patient Position: Sitting, Cuff Size: Adult Regular)   Pulse 75   Resp 16   Ht 1.702 m (5' 7\")   Wt 71.5 kg (157 lb 11.2 oz)   SpO2 100%   Breastfeeding No   BMI 24.70 kg/m    GENERAL APPEARANCE: healthy, alert and no distress  RESP:no distress.  CV: see VS  ABDOMEN:  She has a 7 cm x 7 cm firm mass extruding from abdomen just right of midline. Completely reducible when pt is lying down.  Non-tender.   NEURO:normal.   MUSK:See abdomen.  PSYCHE: normal.    A/P:  Ventral  Abdominal wall hernia.   Refer to [facility].  Advised if she develops pain or the mass becomes irreducible to present to ER.   Total time spent today with this patient including chart review, exam time with patient and documentation : 25 minutes.       Matilde LONG, BLUE                "

## 2022-01-17 ENCOUNTER — PRE VISIT (OUTPATIENT)
Dept: ORTHOPEDICS | Facility: CLINIC | Age: 72
End: 2022-01-17

## 2022-01-17 ENCOUNTER — OFFICE VISIT (OUTPATIENT)
Dept: ORTHOPEDICS | Facility: CLINIC | Age: 72
End: 2022-01-17
Payer: COMMERCIAL

## 2022-01-17 ENCOUNTER — ANCILLARY PROCEDURE (OUTPATIENT)
Dept: ULTRASOUND IMAGING | Facility: CLINIC | Age: 72
End: 2022-01-17
Attending: SURGERY
Payer: COMMERCIAL

## 2022-01-17 ENCOUNTER — ANCILLARY PROCEDURE (OUTPATIENT)
Dept: GENERAL RADIOLOGY | Facility: CLINIC | Age: 72
End: 2022-01-17
Attending: STUDENT IN AN ORGANIZED HEALTH CARE EDUCATION/TRAINING PROGRAM
Payer: COMMERCIAL

## 2022-01-17 ENCOUNTER — OFFICE VISIT (OUTPATIENT)
Dept: PHARMACY | Facility: CLINIC | Age: 72
End: 2022-01-17
Payer: COMMERCIAL

## 2022-01-17 VITALS — WEIGHT: 157 LBS | BODY MASS INDEX: 24.64 KG/M2 | HEIGHT: 67 IN

## 2022-01-17 VITALS — SYSTOLIC BLOOD PRESSURE: 111 MMHG | HEART RATE: 77 BPM | DIASTOLIC BLOOD PRESSURE: 75 MMHG

## 2022-01-17 DIAGNOSIS — Z78.9 TAKES DIETARY SUPPLEMENTS: ICD-10-CM

## 2022-01-17 DIAGNOSIS — M94.9 OSTEOCHONDRAL LESION OF TALAR DOME: ICD-10-CM

## 2022-01-17 DIAGNOSIS — R07.81 RIB PAIN ON RIGHT SIDE: ICD-10-CM

## 2022-01-17 DIAGNOSIS — E03.9 HYPOTHYROIDISM, UNSPECIFIED TYPE: ICD-10-CM

## 2022-01-17 DIAGNOSIS — B35.1 ONYCHOMYCOSIS: ICD-10-CM

## 2022-01-17 DIAGNOSIS — I72.3 ILIAC ARTERY ANEURYSM (H): ICD-10-CM

## 2022-01-17 DIAGNOSIS — G47.9 SLEEP DISORDER: ICD-10-CM

## 2022-01-17 DIAGNOSIS — S99.912D INJURY OF LEFT ANKLE, SUBSEQUENT ENCOUNTER: ICD-10-CM

## 2022-01-17 DIAGNOSIS — E78.00 PURE HYPERCHOLESTEROLEMIA: ICD-10-CM

## 2022-01-17 DIAGNOSIS — R11.0 NAUSEA: ICD-10-CM

## 2022-01-17 DIAGNOSIS — I10 BENIGN ESSENTIAL HYPERTENSION: Primary | ICD-10-CM

## 2022-01-17 DIAGNOSIS — R07.81 RIB PAIN ON RIGHT SIDE: Primary | ICD-10-CM

## 2022-01-17 DIAGNOSIS — G47.00 INSOMNIA, UNSPECIFIED TYPE: ICD-10-CM

## 2022-01-17 DIAGNOSIS — M89.9 OSTEOCHONDRAL LESION OF TALAR DOME: ICD-10-CM

## 2022-01-17 DIAGNOSIS — K59.00 CONSTIPATION, UNSPECIFIED CONSTIPATION TYPE: ICD-10-CM

## 2022-01-17 DIAGNOSIS — G62.9 PERIPHERAL POLYNEUROPATHY: ICD-10-CM

## 2022-01-17 DIAGNOSIS — M19.072 PRIMARY OSTEOARTHRITIS OF LEFT ANKLE: ICD-10-CM

## 2022-01-17 LAB
CALCIUM 24H UR-MRATE: 0.14 G/SPEC
CALCIUM UR-MCNC: 12.4 MG/DL
CALCIUM/CREAT UR: 0.17 G/G CR
COLLECT DURATION TIME UR: 24 H
CREAT 24H UR-MRATE: 0.84 G/SPEC (ref 0.8–1.8)
CREAT UR-MCNC: 72 MG/DL
SPECIMEN VOL UR: 1160 ML

## 2022-01-17 PROCEDURE — 99607 MTMS BY PHARM ADDL 15 MIN: CPT | Performed by: PHARMACIST

## 2022-01-17 PROCEDURE — 99213 OFFICE O/P EST LOW 20 MIN: CPT | Mod: 24 | Performed by: STUDENT IN AN ORGANIZED HEALTH CARE EDUCATION/TRAINING PROGRAM

## 2022-01-17 PROCEDURE — 81050 URINALYSIS VOLUME MEASURE: CPT | Performed by: PATHOLOGY

## 2022-01-17 PROCEDURE — 73610 X-RAY EXAM OF ANKLE: CPT | Mod: LT | Performed by: RADIOLOGY

## 2022-01-17 PROCEDURE — 82340 ASSAY OF CALCIUM IN URINE: CPT | Performed by: PATHOLOGY

## 2022-01-17 PROCEDURE — 99605 MTMS BY PHARM NP 15 MIN: CPT | Performed by: PHARMACIST

## 2022-01-17 PROCEDURE — 93978 VASCULAR STUDY: CPT | Performed by: RADIOLOGY

## 2022-01-17 PROCEDURE — 71101 X-RAY EXAM UNILAT RIBS/CHEST: CPT | Mod: RT | Performed by: RADIOLOGY

## 2022-01-17 RX ORDER — SPIRONOLACTONE 25 MG/1
50 TABLET ORAL DAILY
Qty: 90 TABLET | Refills: 3 | COMMUNITY
Start: 2022-01-17 | End: 2022-02-04

## 2022-01-17 RX ORDER — HYDROCODONE BITARTRATE AND ACETAMINOPHEN 5; 325 MG/1; MG/1
1 TABLET ORAL EVERY 6 HOURS PRN
COMMUNITY
End: 2022-02-04

## 2022-01-17 ASSESSMENT — MIFFLIN-ST. JEOR: SCORE: 1269.78

## 2022-01-17 NOTE — TELEPHONE ENCOUNTER
DIAGNOSIS: Right side rib pain   APPOINTMENT DATE: 1.17.22   NOTES STATUS DETAILS   OFFICE NOTE from referring provider N/A    OFFICE NOTE from other specialist N/A    DISCHARGE SUMMARY from hospital N/A    DISCHARGE REPORT from the ER N/A    OPERATIVE REPORT N/A    EMG report N/A    MEDICATION LIST Internal    MRI N/A    DEXA (osteoporosis/bone health) Internal    CT SCAN Internal    XRAYS (IMAGES & REPORTS) Internal

## 2022-01-17 NOTE — PATIENT INSTRUCTIONS
Recommendations from today's MTM visit:                                                       1. To get a fasting cholesterol done whenever you get your next labs done.   2. Continue other medications as planned.    Follow-up: Return in 3 months (on 4/19/2022).    It was great to speak with you today.  I value your experience and would be very thankful for your time with providing feedback on our clinic survey. You may receive a survey via email or text message in the next few days.     To schedule another MTM appointment, please call the clinic directly or you may call the MTM scheduling line at [phone] or toll-free at [phone].     My Clinical Pharmacist's contact information:                                                      Please feel free to contact me with any questions or concerns you have.      Willis So, Pharm. D., [facility]  Medication Therapy Management Pharmacist  Direct Voicemail: [phone]

## 2022-01-17 NOTE — PROGRESS NOTES
[facility]  Sports Medicine Clinic  [facility]           SUBJECTIVE       Nadira Perez is a 71 year old female presenting to clinic today with concerns for right sided rib pain. Today, the patient reports that on 1/14/22 she was taking out her recycling and leaned down to grab a can and as she bent down she leaned over the trash bin and as she bent over she felt a shift and a pop and then started to notice some right sided rib pain. She is on zometa and her cancer providers are interested in putting her on a stronger medication. Since she has had multiple fractures over the past few years, she was wondering if she has another rib fracture. Reports that she had a fall on 12/20/21 that caused her some spine fractures, but also mentions she hurt her left ankle and it is still swollen.     Background:   Date of injury: 1/14/22  Duration of symptoms: 4 days   Mechanism of Injury: Bent over picking up a can  Intensity: 2/10   Aggravating factors: cough, sneeze, taking deep breath   Relieving Factors: Tylenol 3,000 mg/day   Prior Evaluation: No      PMH, Medications and Allergies were reviewed and updated as needed.    ROS:  As noted above otherwise negative.    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     Corneal opacity     Hypothyroidism     Osteopenia, unspecified location     Aromatase inhibitor use     Chronic pain of right knee     DDD (degenerative disc disease), lumbar      Degenerative scoliosis in adult patient     Right carpal tunnel syndrome     Peripheral neuropathy     Spinal stenosis of lumbar region with neurogenic claudication     Spondylolisthesis of lumbar region     Brain lesion     Pain in both hands     S/P wrist surgery       Current Outpatient Medications   Medication Sig Dispense Refill     acetaminophen (TYLENOL) 325 MG tablet Take 2 tablets (650 mg) by mouth every 4 hours as needed for other 60 tablet 0     amLODIPine (NORVASC) 10 MG tablet Take 1 tablet (10 mg) by mouth daily (Patient taking differently: Take 10 mg by mouth daily Takes around 10 AM.) 90 tablet 3     Ascorbic Acid (VITAMIN C) 500 MG CHEW Take 1 tablet by mouth daily       atorvastatin (LIPITOR) 80 MG tablet Take 1 tablet (80 mg) by mouth daily (Patient taking differently: Take 80 mg by mouth every evening ) 90 tablet 3     Cholecalciferol (VITAMIN D3) 50 MCG (2000 UT) CAPS Take 6,000 Units by mouth daily (Patient taking differently: Take 6,000 Units by mouth daily (with lunch) ) 270 capsule 3     CRANBERRY EXTRACT PO Take 650 mg by mouth daily ~10 am  Takes 2       diclofenac (VOLTAREN) 1 % topical gel APPLY 4 GRAMS TO KNEES OR 2 GRAMS TO HANDS FOUR TIMES DAILY USING ENCLOSED DOSING CARD. 100 g 3     gabapentin (NEURONTIN) 300 MG capsule Take 1-2 capsules by mouth every 8 hours (Patient taking differently: Take 600 mg by mouth 3 times daily ) 540 capsule 3     HYDROcodone-acetaminophen (NORCO) 5-325 MG tablet Take 1 tablet by mouth every 6 hours as needed for severe pain Uses about 10 tablets per month.       hydrOXYzine (ATARAX) 10 MG tablet Take 1 tablet (10 mg) by mouth 2 times daily as needed for itching (pain adjuvant) 30 tablet 0     ibuprofen (ADVIL/MOTRIN) 600 MG tablet Take 1 tablet (600 mg) by mouth every 6 hours as needed for other (mild and/or inflammatory pain) (Patient not taking: Reported on 12/17/2021) 30 tablet 0     levothyroxine (SYNTHROID/LEVOTHROID) 112 MCG tablet TAKE 1/2 TABLET  "BY MOUTH DAILY 45 tablet 3     lisinopril (ZESTRIL) 40 MG tablet Take 1 tablet (40 mg) by mouth daily 90 tablet 3     melatonin 5 MG tablet Take 10 mg by mouth At Bedtime        methocarbamol (ROBAXIN) 500 MG tablet Take 1 tablet (500 mg) by mouth 3 times daily 40 tablet 0     metoprolol succinate ER (TOPROL-XL) 50 MG 24 hr tablet Take 1 tablet (50 mg) by mouth daily (Patient taking differently: Take 50 mg by mouth every morning ) 90 tablet 3     Multiple Vitamin (MULTI-VITAMIN) per tablet Take 1 tablet by mouth daily (with lunch)        ondansetron (ZOFRAN-ODT) 4 MG ODT tab Take 1 tablet (4 mg) by mouth every 12 hours as needed for nausea 180 tablet 3     polyethylene glycol (MIRALAX) 17 g packet Take 17 g by mouth 2 times daily (Patient taking differently: Take 17 g by mouth 2 times daily as needed ) 7 packet 0     senna-docusate (SENOKOT-S/PERICOLACE) 8.6-50 MG tablet Take 2 tablets by mouth 2 times daily 30 tablet 0     spironolactone (ALDACTONE) 25 MG tablet Take 2 tablets (50 mg) by mouth daily 90 tablet 3     terbinafine (LAMISIL AT) 1 % external cream Apply topically 2 times daily For fungal infection not resolved with other antifungals (e.g. Clotrimazole) (Patient taking differently: Apply topically 2 times daily as needed (toenail fungus) For fungal infection not resolved with other antifungals (e.g. Clotrimazole)) 24 g 11     triamcinolone (KENALOG) 0.1 % external ointment Apply topically 2 times daily (Patient taking differently: Apply topically 2 times daily as needed ) 30 g 11     Vaginal Lubricant (REPHRESH) GEL Place 2 g vaginally every 3 days 2 g 3            OBJECTIVE:       Vitals:   Vitals:    01/17/22 1308   Weight: 71.2 kg (157 lb)   Height: 1.702 m (5' 7\")     BMI: Body mass index is 24.59 kg/m .    Gen:  Well nourished and in no acute distress  HEENT: Extraocular movement intact  Neck: Supple  Pulm:  Breathing Comfortably. No increased respiratory effort.  Psych: Euthymic. Appropriately " answers questions    MSK: Normal range of motion of the lumbar spine.  Twisting to the left that does lead to anterior pain in the area of rib #10/11.  Tenderness to palpation in that same area.  No bruising or palpable step-offs.      XRAY : Somewhat concerning appearance of the floating rib on the right, somewhat obscured by the liver border.  Mild evidence of possible fracturing of rib 10.  We will await official radiologic read for this and the ankle.          ASSESSMENT and PLAN:     Nadira was seen today for consult for.    Diagnoses and all orders for this visit:    Rib pain on right side  -     X-ray rt Rib unilat 3 vws + PA chest; Future    Injury of left ankle, subsequent encounter  -     X-ray lt ankle G/E 3 views*; Future    71-year-old female presenting to clinic today with concerns for right rib pain.  She does have a history of osteoporosis and has been on medications.  Currently being worked up for this for possible treatment with Dr. Clark.  She does have a history of some compression fractures of the lumbar spine spine noticed as well.  Given her history as well as presentation, this is most concerning for fragility, stress fracture, of the right ribs.  Will await official radiologic read given the questionable appearance on x-ray today.  However, given the patient's overall high risk, we did educate her and counseled her on management for this sort of fracture.  Would like activity modification, rest, and analgesics to alleviate her pain.  Typically these fractures heal within 6 to 8 weeks, but this is complicated by her bone mineral density history.  After her surgery for her back, she also had an incident with her left ankle.  It was never evaluated.  She has noticed more swelling in that area.  I did order an x-ray of the left ankle for the way out since she is ambulating at her baseline with a walker.  We will follow-up the official results of the x-ray for the ribs and the left ankle  with the patient.      Options for treatment and/or follow-up care were reviewed with the patient was actively involved in the decision making process. Patient verbalized understanding and was in agreement with the plan.    Rolan Morgan DO  , Sports Medicine  Department of Family Medicine and [facility]

## 2022-01-17 NOTE — PROGRESS NOTES
Medication Therapy Management (MTM) Encounter    ASSESSMENT:                            Medication Adherence/Access: No issues identified    Hypertension: Stable. Patient is meeting blood pressure goal of < 140/90mmHg.    Hyperlipidemia: Patient is due for updated lipid draw.    Hypothyroidism: Stable.     Sleep: Stable.      Nausea: Stable.      Creams: Stable.     Pain: Stable.     Constipation: Stable.      Supplements: Stable.     PLAN:                            1. To get a fasting cholesterol done whenever you get your next labs done.   2. Continue other medications as planned.    Follow-up: 3 months    SUBJECTIVE/OBJECTIVE:                          Nadira Perez is a 71 year old female coming in for a follow-up visit.  Today's visit is a follow-up MTM visit from 12/17/21     Reason for visit: CMR.    Allergies/ADRs: Reviewed in chart  Past Medical History: Reviewed in chart  Tobacco: She reports that she has never smoked. She has never used smokeless tobacco.  Alcohol: rare      Medication Adherence/Access: no issues reported    Hypertension: Current medications include metoprolol XL 50 mg at 8 AM, she takes the amlodipine 10 mg at 6 pm, she takes lisinopril 40 mg at 800 am, and spironolactone 5 mg daily at 8 am.  She reports that its sometimes hard to get all three in. Patient does self-monitor blood pressure. Home BP monitoring in range of 130-140s/80s mmHg.  Reports that her pedal edema is slightly better on higher dose of spironolactone.      Date BP   1/16 132/87   1/15 132/92   1/14 129/78   1/13 132/87   1/12 117/87         BP Readings from Last 3 Encounters:   01/17/22 111/75   01/13/22 136/80   12/08/21 (!) 149/89     Potassium   Date Value Ref Range Status   01/12/2022 4.0 3.4 - 5.3 mmol/L Final   11/18/2021 2.6 (LL) 3.4 - 5.3 mmol/L Final   05/07/2021 3.2 (L) 3.4 - 5.3 mmol/L Final   05/07/2021 3.3 (L) 3.4 - 5.3 mmol/L Final        Hyperlipidemia: Current therapy includes atorvastatin 80 mg  "daily.  Patient reports no significant myalgias or other side effects. Did not get updated lipid value since last visit.    Recent Labs   Lab Test 05/07/21  1234 12/16/20  1105 10/27/16  1121 10/30/15  1221 11/06/14  1141   CHOL 217* 246*   < > 182 228*   HDL 60 64   < > 48* 63   * 114*   < > 102 125   TRIG 229* 338*   < > 159* 198*   CHOLHDLRATIO  --   --   --  3.8 3.6    < > = values in this interval not displayed.       Hypothyroidism: Patient is taking levothyroxine 56 mcg (half of the 112 mcg tablet) daily. Patient is having the following symptoms: none.   TSH   Date Value Ref Range Status   01/12/2022 1.54 0.40 - 4.00 mU/L Final   11/11/2019 1.91 0.40 - 4.00 mU/L Final       Sleep: Takes melatonin 10 mg by mouth daily and this works well for her. No concerns or questions at this time.      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 If she moves her head too quickly she gets dizzy. She hasn't taken it yet but has it. She is followed by the balance clinic through neurology -  This has been on hold due to her orthopedic stuff she has had going on.      Creams: Has Lamisil 1% cream for toe fungus that she hasnt used lately since her toe has completely cleared on her left toe and almost gone on right to. She also uses Kenalog 0.1% cream as needed for Eczema which has cleared but she has it as needed. She also uses Rephresh Gel every 3 days due to vaginal dryness. No questions or concerns.     Pain: Still post op care. Currently taking methocarbamol 500 mg three times a day (will get this renewed as she has run out), hydroxyzine 10 mg sometimes \"takes the edge off\" She also takes 1000 mg of acetaminophen three times a day, Vicodin 5-325 she takes about uses about less than 10 tablets per month, she does take ibuprofen 600 mg three times a day (but she is not using it as recommended pos-op), gabapentin 600 mg three times a day (has been taking 900 mg three times a day " due to acute pain concerns), and Voltaren 1% gel for hands and knees (usin more now because she is taking less hydrocodone). Since not taking the ibuprofen her wrist has been hurting more than her back. Not doing PT due to the fall she had the latter half of September. Thinks she broke a rib. Surgery from beginning of December on wrist is still painful.     Constipation: Currently taking senakot 8.6-50 mg 2 tablets twice a day and miralax 17g as needed. Reports she is going every other day. Reports she should drink more water.     Supplements: She is taking vitamin D 6000 units daily (ON HOLD -  She is unsure on when she should have this redrawn  ) No calcium due to history of hyoperparathyroidism, going to be discusin evenity., cranberry 650 mg x 2 daily, Centrum daily vitamin, and vitamin C 500 mg daily. No questions or concerns.     Vitamin D Deficiency Screening Results:    Lab Results   Component Value Date    VITDT 87 (H) 01/12/2022    VITDT 69 05/07/2021    VITDT 56 05/12/2020    VITDT 62 11/11/2019    VITDT 68 11/13/2018       Today's Vitals: There were no vitals taken for this visit.  ----------------      I spent 60 minutes with this patient today. All changes were made via collaborative practice agreement with MERCEDES Kyle CNP. A copy of the visit note was provided to the patient's provider(s).    The patient was given a summary of these recommendations.     Willis So, Pharm. D., [facility]  Medication Therapy Management Pharmacist  Direct Voicemail: [phone]     Medication Therapy Recommendations  No medication therapy recommendations to display

## 2022-01-17 NOTE — LETTER
1/17/2022      RE: Nadira Perez  [address]  [facility] [address]       [facility]  Sports Medicine Clinic  [facility]           SUBJECTIVE       Nadira Perez is a 71 year old female presenting to clinic today with concerns for right sided rib pain. Today, the patient reports that on 1/14/22 she was taking out her recycling and leaned down to grab a can and as she bent down she leaned over the trash bin and as she bent over she felt a shift and a pop and then started to notice some right sided rib pain. She is on zometa and her cancer providers are interested in putting her on a stronger medication. Since she has had multiple fractures over the past few years, she was wondering if she has another rib fracture. Reports that she had a fall on 12/20/21 that caused her some spine fractures, but also mentions she hurt her left ankle and it is still swollen.     Background:   Date of injury: 1/14/22  Duration of symptoms: 4 days   Mechanism of Injury: Bent over picking up a can  Intensity: 2/10   Aggravating factors: cough, sneeze, taking deep breath   Relieving Factors: Tylenol 3,000 mg/day   Prior Evaluation: No      PMH, Medications and Allergies were reviewed and updated as needed.    ROS:  As noted above otherwise negative.    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     Corneal opacity     Hypothyroidism     Osteopenia, unspecified location     Aromatase inhibitor  use     Chronic pain of right knee     DDD (degenerative disc disease), lumbar     Degenerative scoliosis in adult patient     Right carpal tunnel syndrome     Peripheral neuropathy     Spinal stenosis of lumbar region with neurogenic claudication     Spondylolisthesis of lumbar region     Brain lesion     Pain in both hands     S/P wrist surgery       Current Outpatient Medications   Medication Sig Dispense Refill     acetaminophen (TYLENOL) 325 MG tablet Take 2 tablets (650 mg) by mouth every 4 hours as needed for other 60 tablet 0     amLODIPine (NORVASC) 10 MG tablet Take 1 tablet (10 mg) by mouth daily (Patient taking differently: Take 10 mg by mouth daily Takes around 10 AM.) 90 tablet 3     Ascorbic Acid (VITAMIN C) 500 MG CHEW Take 1 tablet by mouth daily       atorvastatin (LIPITOR) 80 MG tablet Take 1 tablet (80 mg) by mouth daily (Patient taking differently: Take 80 mg by mouth every evening ) 90 tablet 3     Cholecalciferol (VITAMIN D3) 50 MCG (2000 UT) CAPS Take 6,000 Units by mouth daily (Patient taking differently: Take 6,000 Units by mouth daily (with lunch) ) 270 capsule 3     CRANBERRY EXTRACT PO Take 650 mg by mouth daily ~10 am  Takes 2       diclofenac (VOLTAREN) 1 % topical gel APPLY 4 GRAMS TO KNEES OR 2 GRAMS TO HANDS FOUR TIMES DAILY USING ENCLOSED DOSING CARD. 100 g 3     gabapentin (NEURONTIN) 300 MG capsule Take 1-2 capsules by mouth every 8 hours (Patient taking differently: Take 600 mg by mouth 3 times daily ) 540 capsule 3     HYDROcodone-acetaminophen (NORCO) 5-325 MG tablet Take 1 tablet by mouth every 6 hours as needed for severe pain Uses about 10 tablets per month.       hydrOXYzine (ATARAX) 10 MG tablet Take 1 tablet (10 mg) by mouth 2 times daily as needed for itching (pain adjuvant) 30 tablet 0     ibuprofen (ADVIL/MOTRIN) 600 MG tablet Take 1 tablet (600 mg) by mouth every 6 hours as needed for other (mild and/or inflammatory pain) (Patient not taking: Reported on 12/17/2021)  "30 tablet 0     levothyroxine (SYNTHROID/LEVOTHROID) 112 MCG tablet TAKE 1/2 TABLET BY MOUTH DAILY 45 tablet 3     lisinopril (ZESTRIL) 40 MG tablet Take 1 tablet (40 mg) by mouth daily 90 tablet 3     melatonin 5 MG tablet Take 10 mg by mouth At Bedtime        methocarbamol (ROBAXIN) 500 MG tablet Take 1 tablet (500 mg) by mouth 3 times daily 40 tablet 0     metoprolol succinate ER (TOPROL-XL) 50 MG 24 hr tablet Take 1 tablet (50 mg) by mouth daily (Patient taking differently: Take 50 mg by mouth every morning ) 90 tablet 3     Multiple Vitamin (MULTI-VITAMIN) per tablet Take 1 tablet by mouth daily (with lunch)        ondansetron (ZOFRAN-ODT) 4 MG ODT tab Take 1 tablet (4 mg) by mouth every 12 hours as needed for nausea 180 tablet 3     polyethylene glycol (MIRALAX) 17 g packet Take 17 g by mouth 2 times daily (Patient taking differently: Take 17 g by mouth 2 times daily as needed ) 7 packet 0     senna-docusate (SENOKOT-S/PERICOLACE) 8.6-50 MG tablet Take 2 tablets by mouth 2 times daily 30 tablet 0     spironolactone (ALDACTONE) 25 MG tablet Take 2 tablets (50 mg) by mouth daily 90 tablet 3     terbinafine (LAMISIL AT) 1 % external cream Apply topically 2 times daily For fungal infection not resolved with other antifungals (e.g. Clotrimazole) (Patient taking differently: Apply topically 2 times daily as needed (toenail fungus) For fungal infection not resolved with other antifungals (e.g. Clotrimazole)) 24 g 11     triamcinolone (KENALOG) 0.1 % external ointment Apply topically 2 times daily (Patient taking differently: Apply topically 2 times daily as needed ) 30 g 11     Vaginal Lubricant (REPHRESH) GEL Place 2 g vaginally every 3 days 2 g 3            OBJECTIVE:       Vitals:   Vitals:    01/17/22 1308   Weight: 71.2 kg (157 lb)   Height: 1.702 m (5' 7\")     BMI: Body mass index is 24.59 kg/m .    Gen:  Well nourished and in no acute distress  HEENT: Extraocular movement intact  Neck: Supple  Pulm:  Breathing " Comfortably. No increased respiratory effort.  Psych: Euthymic. Appropriately answers questions    MSK: Normal range of motion of the lumbar spine.  Twisting to the left that does lead to anterior pain in the area of rib #10/11.  Tenderness to palpation in that same area.  No bruising or palpable step-offs.      XRAY : Somewhat concerning appearance of the floating rib on the right, somewhat obscured by the liver border.  Mild evidence of possible fracturing of rib 10.  We will await official radiologic read for this and the ankle.          ASSESSMENT and PLAN:     Nadira was seen today for consult for.    Diagnoses and all orders for this visit:    Rib pain on right side  -     X-ray rt Rib unilat 3 vws + PA chest; Future    Injury of left ankle, subsequent encounter  -     X-ray lt ankle G/E 3 views*; Future    71-year-old female presenting to clinic today with concerns for right rib pain.  She does have a history of osteoporosis and has been on medications.  Currently being worked up for this for possible treatment with Dr. Clark.  She does have a history of some compression fractures of the lumbar spine spine noticed as well.  Given her history as well as presentation, this is most concerning for fragility, stress fracture, of the right ribs.  Will await official radiologic read given the questionable appearance on x-ray today.  However, given the patient's overall high risk, we did educate her and counseled her on management for this sort of fracture.  Would like activity modification, rest, and analgesics to alleviate her pain.  Typically these fractures heal within 6 to 8 weeks, but this is complicated by her bone mineral density history.  After her surgery for her back, she also had an incident with her left ankle.  It was never evaluated.  She has noticed more swelling in that area.  I did order an x-ray of the left ankle for the way out since she is ambulating at her baseline with a walker.  We will  follow-up the official results of the x-ray for the ribs and the left ankle with the patient.      Options for treatment and/or follow-up care were reviewed with the patient was actively involved in the decision making process. Patient verbalized understanding and was in agreement with the plan.    Rolan Morgan DO  , Sports Medicine  Department of Family Medicine and [facility]

## 2022-01-17 NOTE — LETTER
_  Medication List        Prepared on: 1/17/2022     Bring your Medication List when you go to the doctor, hospital, or   emergency room. And, share it with your family or caregivers.     Note any changes to how you take your medications.  Cross out medications when you no longer use them.    Medication How I take it Why I use it Prescriber   acetaminophen (TYLENOL) 325 MG tablet Take 2 tablets (650 mg) by mouth every 4 hours as needed for other Spinal stenosis of lumbar region with neurogenic claudication MERCEDES Gibson CNS   amLODIPine (NORVASC) 10 MG tablet Take 1 tablet (10 mg) by mouth daily Benign Essential Hypertension MERCEDES Rivera CNP   Ascorbic Acid (VITAMIN C) 500 MG CHEW Take 1 tablet by mouth daily supplement  over-the-counter   atorvastatin (LIPITOR) 80 MG tablet Take 1 tablet (80 mg) by mouth daily Pure Hypercholesterolemia MERCEDES Rivera CNP   Cholecalciferol (VITAMIN D3) 50 MCG (2000 UT) CAPS Take 6,000 Units by mouth daily Hypovitaminosis D MERCEDES Rivera CNP   CRANBERRY EXTRACT PO Take 650 mg by mouth daily ~10 am  Takes 2  UTI prevention  over-the-counter   diclofenac (VOLTAREN) 1 % topical gel APPLY 4 GRAMS TO KNEES OR 2 GRAMS TO HANDS FOUR TIMES DAILY USING ENCLOSED DOSING CARD. Right knee pain, unspecified chronicity MERCEDES Rivera CNP   gabapentin (NEURONTIN) 300 MG capsule Take 1-2 capsules by mouth every 8 hours Neuropathic pain MERCEDES Rivera CNP   HYDROcodone-acetaminophen (NORCO) 5-325 MG tablet Take 1 tablet by mouth every 6 hours as needed for severe pain Uses about 10 tablets per month.   Pain Anyiwe Mahmood   hydrOXYzine (ATARAX) 10 MG tablet Take 1 tablet (10 mg) by mouth 2 times daily as needed for itching (pain adjuvant) Spinal stenosis of lumbar region with neurogenic claudication Serene Millard PA-C   ibuprofen (ADVIL/MOTRIN) 600 MG tablet Take 1 tablet (600 mg) by mouth every 6 hours as needed for other (mild and/or  inflammatory pain) Right carpal tunnel syndrome Fidel Cassidy MD   levothyroxine (SYNTHROID/LEVOTHROID) 112 MCG tablet TAKE 1/2 TABLET BY MOUTH DAILY Hypothyroidism, unspecified type MERCEDES Rivera CNP   lisinopril (ZESTRIL) 40 MG tablet Take 1 tablet (40 mg) by mouth daily Benign Essential Hypertension MERCEDES Rivera CNP   melatonin 5 MG tablet Take 10 mg by mouth At Bedtime   supplement  over-the-counter   methocarbamol (ROBAXIN) 500 MG tablet Take 1 tablet (500 mg) by mouth 3 times daily Spinal stenosis of lumbar region with neurogenic claudication Serge Ramirez MD   metoprolol succinate ER (TOPROL-XL) 50 MG 24 hr tablet Take 1 tablet (50 mg) by mouth daily Benign Essential Hypertension MERCEDES Rivera CNP   Multiple Vitamin (MULTI-VITAMIN) per tablet Take 1 tablet by mouth daily (with lunch)   supplement  over-the-counter   ondansetron (ZOFRAN-ODT) 4 MG ODT tab Take 1 tablet (4 mg) by mouth every 12 hours as needed for nausea Vertigo MERCEDES Rivera CNP   polyethylene glycol (MIRALAX) 17 g packet Take 17 g by mouth 2 times daily Spinal stenosis of lumbar region with neurogenic claudication MERCEDES Gibson CNS   senna-docusate (SENOKOT-S/PERICOLACE) 8.6-50 MG tablet Take 2 tablets by mouth 2 times daily Spinal stenosis of lumbar region with neurogenic claudication MERCEDES Gibson   spironolactone (ALDACTONE) 25 MG tablet Take 2 tablets (50 mg) by mouth daily  blood pressure MERCEDES Rivera CNP   terbinafine (LAMISIL AT) 1 % external cream Apply topically 2 times daily For fungal infection not resolved with other antifungals (e.g. Clotrimazole) Tinea pedis of both feet MERCEDES Rivera CNP   triamcinolone (KENALOG) 0.1 % external ointment Apply topically 2 times daily Eczema, unspecified type MERCEDES Rivera CNP   Vaginal Lubricant (REPHRESH) GEL Place 2 g vaginally every 3 days Atrophic Vaginitis MERCEDES Rivera  CNP         Add new medications, over-the-counter drugs, herbals, vitamins, or  minerals in the blank rows below.    Medication How I take it Why I use it Prescriber                          Allergies:      erythromycin; penicillins        Side effects I have had:              Other Information:             My notes and questions:

## 2022-01-17 NOTE — LETTER
"Recommended To-Do List      Prepared on: 1/17/2022     You can get the best results from your medications by completing the items on this \"To-Do List.\"      Bring your To-Do List when you go to your doctor. And, share it with your family or caregivers.    My To-Do List:  What we talked about: What I should do:   We discussed the previous increase in your atorvastatin and how you are due for fasting cholesterol labs.  Get your fasting cholesterol labs checked                "

## 2022-01-17 NOTE — LETTER
January 17, 2022  Nadira Perez  [address]  [place] [address]    Dear Ms. Perez, [facility]        Thank you for talking with me on Jan 17, 2022 about your health and medications. As a follow-up to our conversation, I have included two documents:      1. Your Recommended To-Do List has steps you should take to get the best results from your medications.  2. Your Medication List will help you keep track of your medications and how to take them.    If you want to talk about these documents, please call Willis So RPH at phone: [phone], Monday-Friday 8-4:30pm.    I look forward to working with you and your doctors to make sure your medications work well for you.    Sincerely,    Willis So RPH

## 2022-01-18 ENCOUNTER — THERAPY VISIT (OUTPATIENT)
Dept: OCCUPATIONAL THERAPY | Facility: CLINIC | Age: 72
End: 2022-01-18
Payer: OTHER MISCELLANEOUS

## 2022-01-18 DIAGNOSIS — Z98.890 S/P WRIST SURGERY: ICD-10-CM

## 2022-01-18 DIAGNOSIS — M79.642 PAIN IN BOTH HANDS: ICD-10-CM

## 2022-01-18 DIAGNOSIS — M79.641 PAIN IN BOTH HANDS: ICD-10-CM

## 2022-01-18 DIAGNOSIS — G56.01 RIGHT CARPAL TUNNEL SYNDROME: ICD-10-CM

## 2022-01-18 PROCEDURE — 97110 THERAPEUTIC EXERCISES: CPT | Mod: GO | Performed by: OCCUPATIONAL THERAPIST

## 2022-01-18 PROCEDURE — 97140 MANUAL THERAPY 1/> REGIONS: CPT | Mod: GO | Performed by: OCCUPATIONAL THERAPIST

## 2022-01-18 NOTE — TELEPHONE ENCOUNTER
REFERRAL INFORMATION:    Referring Provider:  MERCEDES Montes De Oca CNP     Referring Clinic:  [facility]     Reason for Visit/Diagnosis: Ventral Hernia        FUTURE VISIT INFORMATION:    Appointment Date: 2/7/2022    Appointment Time: 8 AM      NOTES RECORD STATUS  DETAILS   OFFICE NOTE from Referring Provider Internal 1/13/2022 Office visit with MERCEDES Montes De Oca CNP    OFFICE NOTE from Other Specialists N/A    HOSPITAL DISCHARGE SUMMARY/ ED VISITS  N/A    OPERATIVE REPORT N/A    ENDOSCOPY (EGD)  Internal  11/23/16   PERTINENT LABS Internal    PATHOLOGY REPORTS (RELATED) N/A    IMAGING (CT, MRI, US, XR)  N/A

## 2022-01-19 ENCOUNTER — OFFICE VISIT (OUTPATIENT)
Dept: OPHTHALMOLOGY | Facility: CLINIC | Age: 72
End: 2022-01-19
Attending: OPHTHALMOLOGY
Payer: COMMERCIAL

## 2022-01-19 ENCOUNTER — TELEPHONE (OUTPATIENT)
Dept: ORTHOPEDICS | Facility: CLINIC | Age: 72
End: 2022-01-19
Payer: COMMERCIAL

## 2022-01-19 DIAGNOSIS — H35.372 EPIRETINAL MEMBRANE (ERM) OF LEFT EYE: ICD-10-CM

## 2022-01-19 DIAGNOSIS — Z96.1 PSEUDOPHAKIA OF BOTH EYES: ICD-10-CM

## 2022-01-19 DIAGNOSIS — H43.812 PVD (POSTERIOR VITREOUS DETACHMENT), LEFT EYE: ICD-10-CM

## 2022-01-19 DIAGNOSIS — H52.13 MYOPIA OF BOTH EYES: ICD-10-CM

## 2022-01-19 DIAGNOSIS — H33.312 RETINAL TEAR OF LEFT EYE: Primary | ICD-10-CM

## 2022-01-19 PROCEDURE — 92201 OPSCPY EXTND RTA DRAW UNI/BI: CPT | Performed by: OPHTHALMOLOGY

## 2022-01-19 PROCEDURE — 92014 COMPRE OPH EXAM EST PT 1/>: CPT | Performed by: OPHTHALMOLOGY

## 2022-01-19 PROCEDURE — G0463 HOSPITAL OUTPT CLINIC VISIT: HCPCS

## 2022-01-19 ASSESSMENT — EXTERNAL EXAM - RIGHT EYE: OD_EXAM: NORMAL

## 2022-01-19 ASSESSMENT — VISUAL ACUITY
METHOD: SNELLEN - LINEAR
OS_PH_SC: 20/25
OD_SC: 20/25
CORRECTION_TYPE: GLASSES
OD_SC+: -1
OS_SC: 20/50

## 2022-01-19 ASSESSMENT — TONOMETRY
OS_IOP_MMHG: 16
OD_IOP_MMHG: 14
IOP_METHOD: ICARE

## 2022-01-19 ASSESSMENT — CUP TO DISC RATIO
OS_RATIO: 0.45
OD_RATIO: 0.4

## 2022-01-19 ASSESSMENT — EXTERNAL EXAM - LEFT EYE: OS_EXAM: NORMAL

## 2022-01-19 ASSESSMENT — CONF VISUAL FIELD
METHOD: COUNTING FINGERS
OS_NORMAL: 1

## 2022-01-19 ASSESSMENT — SLIT LAMP EXAM - LIDS
COMMENTS: NORMAL
COMMENTS: NORMAL

## 2022-01-19 NOTE — PROGRESS NOTES
CC: floaters left eye     Interval History:  she still has a little of floaters / dark spot - now it getting smaller. No longer having flashes of lights. She is disturbed by the floaters in her eye. Not having flashes of lights.  Has YAG Capsulotomy left eye last visit.     HPI: Patient fell 9/24/2020.  Blurry vision left eye starting around 10/2/2020. Noted to have HST - now s/p barricade laser 1/18/2021.    Assessment/plan:   1.  Posterior vitreous detachment (PVD) with sub hyaloid hemorrhage OS   - Fall 9/24/2020; acute blurry vision 10/2/2020   - HST at inferotemporal periphery - s/p retinopexy 1/18/21   - S/Sx of RD discussed for immediate return   - is interested in floaterectomy to remove the interfering floaters left eye. Discussed extensively risks and benefits of PPV vs laser floaterectomy vs observation. Despite discussing about the risks, Ismael thinks symptoms are bothersome enough that she may decide to do surgery in future    2. Retinal tear left eye   - see #1; s/p barricade laser 1/18/21; repeat depressed exam 1/19/22no new findings    3. Pseudophakia each eye   - both eye s/p yag capsulotomy each eye      4. Dry eye each eye   - ATs PRN    5. ERM left eye   - Mild; observe    RTC: 6-12 M for DFE      Complete documentation of historical and exam elements from today's encounter can be found in the full encounter summary report (not reduplicated in this progress note). I personally obtained the chief complaint(s) and history of present illness.  I confirmed and edited as necessary the review of systems, past medical/surgical history, family history, social history, and examination findings as documented by others; and I examined the patient myself. I personally reviewed the relevant tests, images, and reports as documented above. I formulated and edited as necessary the assessment and plan and discussed the findings and management plan with the patient and family.    Felix Oliveros MD

## 2022-01-19 NOTE — PROGRESS NOTES
Study Result    Narrative & Impression   3 views left ankle radiographs 1/17/2022 3:13 PM     History: Injury of left ankle, subsequent encounter     Comparison: None     Findings:     Standing AP, oblique, and lateral  views of the left ankle were  obtained.      No acute osseous abnormality.       Osteochondral lesion of the medial talar dome with probable  fragmentation. Mild to moderate tibiotalar joint degenerative change.  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     Ankle mortise and syndesmosis are congruent on this weight bearing  study. Plantar calcaneal heel spur.     Soft tissue is unremarkable.                                                                      Impression:  1. No acute osseous abnormality.  2. Osteochondral lesion of the medial talar dome with probable  fragmentation. Mild to moderate tibiotalar joint degenerative change.   3. 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     Study Result    Narrative & Impression   EXAM: XR RIBS & CHEST RT 3VW  1/17/2022 1:07 PM       HISTORY: Rib pain on right side     COMPARISON: None     FINDINGS: PA view of the chest and 2 oblique views of the thorax were  obtained.     Cardiac silhouette within normal limits. Lungs grossly clear. No  pleural effusion or pneumothorax. Surgical clips project over the left  axilla.     No visible rib fracture. Please note that nondisplaced rib fractures  may be radiographically occult.                                                                       IMPRESSION:      No visible rib fracture.     Contacted the patient to discuss the most recent findings of the XR of the ribs as well as the Xr of the ankle. The images as above were discussed over the phone with the patient in total.     In regards to her rib/chest xr, it does not appear as though she has a varsha fracture of the ribs from standard imaging/ Although, occult fractures can go unseen with standard films. The most sensitive modality for identifying this kind of fracture  would be CT/MRIU. The patient is avoidant of unnecessary radiation however.  Given this, I have discussed with the patient that we can monitor her overall symptoms over the next couple of weeks.  Given she has an appointment with Dr. Clark on January 26, 2022, to discuss her bone health, I think it would be reasonable for her to have her rib reassessed.  If she is still remaining moderately painful and tender in the area despite the conservative measures that were discussed previously, I think further investigation with advanced imaging MRI/CT could be indicated.    After discussing in total with the patient the x-ray findings of the left ankle, she does have some concerns for tarsal coalition as well as degenerative changes noted about the ankle.  Most notable, she does have an osteochondral defect along the medial talar dome.  Patient has been having difficulty walking but has noticed more pain on the lateral aspect of her ankle.  There are no acute osseous abnormalities noted on the fibular component of the ankle.  However, given the patient's age as well as his comorbidities including low bone mineral density, the stability of this particular lesion could be in question which is also noted for possible fragmenting on plain x-rays.  Given her overall picture, staging of this particular lesion would be done with MRI, did discuss this with the patient.  However given that she has an OCD lesion that is newly diagnosed in an adult, we have also discussed referral to orthopedic surgery for further evaluation and management.  Patient is on board with seeing one of our foot and ankle specialist given this finding.  I am more than happy to see the patient back if nonoperative management is indicated after seeing foot and ankle surgical specialist.    Rolan Morgan DO  , Sports Medicine  Department of Family Medicine and [facility]

## 2022-01-24 ENCOUNTER — OFFICE VISIT (OUTPATIENT)
Dept: ORTHOPEDICS | Facility: CLINIC | Age: 72
End: 2022-01-24
Payer: COMMERCIAL

## 2022-01-24 VITALS — BODY MASS INDEX: 24.64 KG/M2 | WEIGHT: 157 LBS | HEIGHT: 67 IN

## 2022-01-24 DIAGNOSIS — S93.409A GRADE 1 ANKLE SPRAIN: ICD-10-CM

## 2022-01-24 DIAGNOSIS — M89.9 OSTEOCHONDRAL LESION OF TALAR DOME: ICD-10-CM

## 2022-01-24 DIAGNOSIS — R07.81 RIB PAIN ON RIGHT SIDE: Primary | ICD-10-CM

## 2022-01-24 DIAGNOSIS — M94.9 OSTEOCHONDRAL LESION OF TALAR DOME: ICD-10-CM

## 2022-01-24 DIAGNOSIS — M19.072 PRIMARY OSTEOARTHRITIS OF LEFT ANKLE: ICD-10-CM

## 2022-01-24 PROCEDURE — 99213 OFFICE O/P EST LOW 20 MIN: CPT | Mod: 24 | Performed by: STUDENT IN AN ORGANIZED HEALTH CARE EDUCATION/TRAINING PROGRAM

## 2022-01-24 ASSESSMENT — MIFFLIN-ST. JEOR: SCORE: 1269.78

## 2022-01-24 NOTE — PATIENT INSTRUCTIONS
WHAT IS AN ANKLE SPRAIN:  Symptoms include pain, bruising, and swelling around ankle, often on outer side. The pain may be sharp with activity and dull at rest. You may be walking with a limp at first. The swelling is often present after the pain resolves. If your pain is severe, not improving over 5-7 days, or shoots up your leg, let your physician know as you may need crutches and an immobilizer.    Treatment:  -Ice 10-15 minutes several times a day for the first few days and then after activity.  -Walk as tolerated. Restrict other activities until pain allows. Biking, pool running or swimming are good alternative activities.  -You may benefit from an ankle brace for support while strengthening with therapy  -Some require immobilzation and crutches initially    Strengthening therapy  -Ice 10-15 minutes after activity. (or Ice bath 5-7min)  -often hip and ankle weakness leads to lower extremity (foot, ankle, shin, and knee) problems so a lot of focus will be on core strength and balance  - recommend yoga for core strengthening and stretching  -Perform exercises as instructed through handout or formal therapy if doing. Until then start with the following:  -ankle strengthening (additional below)   1) balance on one foot 1-2 min daily (perform on both feet)   2) arch raises- tighten bottom of foot like trying to shorten foot and hold x 3-5  sec, repeat 5 times   3) ankle exercises (4 way with theraband)- 3 sets of 10-15 (fatigue) daily   5) heel raises on step-- once painfree lowering on both, start to slowly lower on  one foot    Return to activity guidelines:  -If it hurts, do not do it!  -wear ankle brace until pain free with full activity and exercises for at least 6 weeks  -Must meet each goal before return to play:   1) painfree jogging straight line   2) pain free sprints   3) pain free cutting/ changing directions      Ankle Sprain Exercises    As soon as it doesn t hurt too much to put pressure on the ball  of your foot, start stretching your ankle using the towel stretch. When this stretch is easy, try the other exercises.    Towel stretch: Sit on a hard surface with your injured leg stretched out in front of you. Loop a towel around your toes and the ball of your foot and pull the towel toward your body keeping your leg straight. Hold this position for 15 to 30 seconds and then relax. Repeat 3 times.    Standing calf stretch: Stand facing a wall with your hands on the wall at about eye level. Keep your injured leg back with your heel on the floor. Keep the other leg forward with the knee bent. Turn your back foot slightly inward (as if you were pigeon-toed). Slowly lean into the wall until you feel a stretch in the back of your calf. Hold the stretch for 15 to 30 seconds. Return to the starting position. Repeat 3 times. Do this exercise several times each day.    Standing soleus stretch: Stand facing a wall with your hands on the wall at about chest height. Keep your injured leg back with your heel on the floor. Keep the other leg forward with the knee bent. Turn your back foot slightly inward (as if you were pigeon-toed). Bend your back knee slightly and gently lean into the wall until you feel a stretch in the lower calf of your injured leg. Hold the stretch for 15 to 30 seconds. Return to the starting position. Repeat 3 times.    Ankle range of motion: Sit or lie down with your legs straight and your knees pointing toward the ceiling. Point your toes on your injured side toward your nose, then away from your body. Point your toes in toward your other foot and then out away from your other foot. Finally, move the top of your foot in circles. Move only your foot and ankle. Don't move your leg. Repeat 10 times in each direction. Push hard in all directions.  Resisted ankle dorsiflexion: Tie a knot in one end of the elastic tubing and shut the knot in a door. Tie a loop in the other end of the tubing and put the foot  on your injured side through the loop so that the tubing goes around the top of the foot. Sit facing the door with your injured leg straight out in front of you. Move away from the door until there is tension in the tubing. Keeping your leg straight, pull the top of your foot toward your body, stretching the tubing. Slowly return to the starting position. Do 2 sets of 15.  Resisted ankle plantar flexion: Sit with your injured leg stretched out in front of you. Loop the tubing around the ball of your foot. Hold the ends of the tubing with both hands. Gently press the ball of your foot down and point your toes, stretching the tubing. Return to the starting position. Do 2 sets of 15.    Resisted ankle inversion: Sit with your legs stretched out in front of you. Cross the ankle of your uninjured leg over your other ankle. Wrap elastic tubing around the ball of the foot of your injured leg and then loop it around your other foot so that the tubing is anchored there at one end. Hold the other end of the tubing in your hand. Turn the foot of your injured leg inward and upward. This will stretch the tubing. Return to the starting position. Do 2 sets of 15.    Resisted ankle eversion: Sit with both legs stretched out in front of you, with your feet about a shoulder's width apart. Tie a loop in one end of elastic tubing. Put the foot of your injured leg through the loop so that the tubing goes around the arch of that foot and wraps around the outside of the other foot. Hold onto the other end of the tubing with your hand to provide tension. Turn the foot of your injured leg up and out. Make sure you keep your other foot still so that it will allow the tubing to stretch as you move the foot of your injured leg. Return to the starting position. Do 2 sets of 15.  You may do the following exercises when you can stand on your injured ankle without pain.    Heel raise: Stand behind a chair or counter with both feet flat on the  floor. Using the chair or counter as a support, rise up onto your toes and hold for 5 seconds. Then slowly lower yourself down without holding onto the support. (It's OK to keep holding onto the support if you need to.) When this exercise becomes less painful, try doing this exercise while you are standing on the injured leg only. Repeat 15 times. Do 2 sets of 15. Rest 30 seconds between sets.    Step-up: Stand with the foot of your injured leg on a support 3 to 5 inches (8 to 13 centimeters) high --like a small step or block of wood. Keep your other foot flat on the floor. Shift your weight onto the injured leg on the support. Straighten your injured leg as the other leg comes off the floor. Return to the starting position by bending your injured leg and slowly lowering your uninjured leg back to the floor. Do 2 sets of 15.    Balance and reach exercises: Stand next to a chair with your injured leg farther from the chair. The chair will provide support if you need it. Stand on the foot of your injured leg and bend your knee slightly. Try to raise the arch of this foot while keeping your big toe on the floor. Keep your foot in this position.    With the hand that is farther away from the chair, reach forward in front of you by bending at the waist. Avoid bending your knee any more as you do this. Repeat this 15 times. To make the exercise more challenging, reach farther in front of you. Do 2 sets of 15.    While keeping your arch raised, reach the hand that is farther away from the chair across your body toward the chair. The farther you reach, the more challenging the exercise. Do 2 sets of 15.    Side-lying leg lift: Lie on your uninjured side. Tighten the front thigh muscles on your injured leg and lift that leg 8 to 10 inches (20 to 25 centimeters) away from the other leg. Keep the leg straight and lower it slowly. Do 2 sets of 15.  If you have access to a wobble board, do the following exercises:    Wobble  board exercises  Stand on a wobble board with your feet shoulder-width apart.    Rock the board forwards and backwards 30 times, then side to side 30 times. Hold on to a chair if you need support.  Rotate the wobble board around so that the edge of the board is in contact with the floor at all times. Do this 30 times in a clockwise and then a counterclockwise direction.  Balance on the wobble board for as long as you can without letting the edges touch the floor. Try to do this for 2 minutes without touching the floor.  Rotate the wobble board in clockwise and counterclockwise circles, but do not let the edge of the board touch the floor.  When you have mastered the wobble exercises standing on both legs, try repeating them while standing on just your injured leg. After you are able to do these exercises on one leg, try to do them with your eyes closed. Make sure you have something nearby to support you in case you lose your balance.    Developed by [employer].  Published by [employer].  Copyright  2014 [employer] and/or one of its subsidiaries. All rights reserved.

## 2022-01-24 NOTE — PROGRESS NOTES
[facility]  Sports Medicine Clinic  [facility]           SUBJECTIVE       Ndaira Perez is a 71 year old female presenting to clinic today for a f/u of the left ankle pain.    1/17/22: After discussing in total with the patient the x-ray findings of the left ankle, she does have some concerns for tarsal coalition as well as degenerative changes noted about the ankle.  Most notable, she does have an osteochondral defect along the medial talar dome.  Patient has been having difficulty walking but has noticed more pain on the lateral aspect of her ankle.  There are no acute osseous abnormalities noted on the fibular component of the ankle.  However, given the patient's age as well as his comorbidities including low bone mineral density, the stability of this particular lesion could be in question which is also noted for possible fragmenting on plain x-rays.  Given her overall picture, staging of this particular lesion would be done with MRI, did discuss this with the patient.  However given that she has an OCD lesion that is newly diagnosed in an adult, we have also discussed referral to orthopedic surgery for further evaluation and management.  Patient is on board with seeing one of our foot and ankle specialist given this finding.  I am more than happy to see the patient back if nonoperative management is indicated after seeing foot and ankle surgical specialist.    Interval hx: Patient is overall doing roughly the same.  She still notices rib pain with coughing or sneezing on the right.  She is requesting to have a CT done given her history of osteoporosis.  In terms of her ankle, she is not having much ambulation pain.  But she does notice pain in the anterior medial and anterior lateral aspect of the ankle when she pushes on it or with certain movements, dorsiflexion.  She is not having any numbness or tingling of the foot.  No recent trauma again or new injuries.    PMH,  Medications and Allergies were reviewed and updated as needed.    ROS:  As noted above otherwise negative.    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     Corneal opacity     Hypothyroidism     Osteopenia, unspecified location     Aromatase inhibitor use     Chronic pain of right knee     DDD (degenerative disc disease), lumbar     Degenerative scoliosis in adult patient     Right carpal tunnel syndrome     Peripheral neuropathy     Spinal stenosis of lumbar region with neurogenic claudication     Spondylolisthesis of lumbar region     Brain lesion     Pain in both hands     S/P wrist surgery       Current Outpatient Medications   Medication Sig Dispense Refill     gabapentin (NEURONTIN) 300 MG capsule Take 1-2 capsules by mouth every 8 hours (Patient taking differently: Take 900 mg by mouth 3 times daily ) 540 capsule 3     acetaminophen (TYLENOL) 325 MG tablet Take 2 tablets (650 mg) by mouth every 4 hours as needed for other 60 tablet 0     amLODIPine (NORVASC) 10 MG tablet Take 1 tablet (10 mg) by mouth daily (Patient taking differently: Take 10 mg by mouth daily Takes around 10 AM.) 90 tablet 3     Ascorbic Acid (VITAMIN C) 500 MG CHEW Take 1 tablet by mouth daily       atorvastatin (LIPITOR) 80 MG tablet Take 1 tablet (80 mg) by mouth daily (Patient taking differently: Take 80 mg by mouth every evening ) 90 tablet 3     Cholecalciferol (VITAMIN D3) 50 MCG (2000 UT) CAPS Take 6,000 Units by mouth daily (Patient  taking differently: Take 6,000 Units by mouth daily (with lunch) ) 270 capsule 3     CRANBERRY EXTRACT PO Take 650 mg by mouth daily ~10 am  Takes 2       diclofenac (VOLTAREN) 1 % topical gel APPLY 4 GRAMS TO KNEES OR 2 GRAMS TO HANDS FOUR TIMES DAILY USING ENCLOSED DOSING CARD. 100 g 3     HYDROcodone-acetaminophen (NORCO) 5-325 MG tablet Take 1 tablet by mouth every 6 hours as needed for severe pain Uses about 10 tablets per month.       hydrOXYzine (ATARAX) 10 MG tablet Take 1 tablet (10 mg) by mouth 2 times daily as needed for itching (pain adjuvant) 30 tablet 0     ibuprofen (ADVIL/MOTRIN) 600 MG tablet Take 1 tablet (600 mg) by mouth every 6 hours as needed for other (mild and/or inflammatory pain) 30 tablet 0     levothyroxine (SYNTHROID/LEVOTHROID) 112 MCG tablet TAKE 1/2 TABLET BY MOUTH DAILY 45 tablet 3     lisinopril (ZESTRIL) 40 MG tablet Take 1 tablet (40 mg) by mouth daily 90 tablet 3     melatonin 5 MG tablet Take 10 mg by mouth At Bedtime        methocarbamol (ROBAXIN) 500 MG tablet Take 1 tablet (500 mg) by mouth 3 times daily 40 tablet 0     metoprolol succinate ER (TOPROL-XL) 50 MG 24 hr tablet Take 1 tablet (50 mg) by mouth daily (Patient taking differently: Take 50 mg by mouth every morning ) 90 tablet 3     Multiple Vitamin (MULTI-VITAMIN) per tablet Take 1 tablet by mouth daily (with lunch)        ondansetron (ZOFRAN-ODT) 4 MG ODT tab Take 1 tablet (4 mg) by mouth every 12 hours as needed for nausea 180 tablet 3     polyethylene glycol (MIRALAX) 17 g packet Take 17 g by mouth 2 times daily (Patient taking differently: Take 17 g by mouth 2 times daily as needed ) 7 packet 0     senna-docusate (SENOKOT-S/PERICOLACE) 8.6-50 MG tablet Take 2 tablets by mouth 2 times daily 30 tablet 0     spironolactone (ALDACTONE) 25 MG tablet Take 2 tablets (50 mg) by mouth daily 90 tablet 3     terbinafine (LAMISIL AT) 1 % external cream Apply topically 2 times daily For fungal infection not resolved with other  "antifungals (e.g. Clotrimazole) (Patient taking differently: Apply topically 2 times daily as needed (toenail fungus) For fungal infection not resolved with other antifungals (e.g. Clotrimazole)) 24 g 11     triamcinolone (KENALOG) 0.1 % external ointment Apply topically 2 times daily (Patient taking differently: Apply topically 2 times daily as needed ) 30 g 11     Vaginal Lubricant (REPHRESH) GEL Place 2 g vaginally every 3 days 2 g 3            OBJECTIVE:       Vitals:   Vitals:    01/24/22 1340   Weight: 71.2 kg (157 lb)   Height: 1.702 m (5' 7\")     BMI: Body mass index is 24.59 kg/m .    Gen:  Well nourished and in no acute distress  HEENT: Extraocular movement intact  Neck: Supple  Pulm:  Breathing Comfortably. No increased respiratory effort.  Psych: Euthymic. Appropriately answers questions    MSK:  Right rib tenderness roughly the same from previous exam.  No bruising or edema in the area.  No palpable step-offs.    Left ankle without visible asymmetry.  No effusions, ecchymosis, or erythema.  Tenderness to palpation along the medial and lateral talar dome.  Tenderness to palpation on the anterior talofibular ligament as well.  Positive anterior drawer and talar tilt.  Negative dorsiflexion/eversion testing.      Study Result    Narrative & Impression   3 views left ankle radiographs 1/17/2022 3:13 PM     History: Injury of left ankle, subsequent encounter     Comparison: None     Findings:     Standing AP, oblique, and lateral  views of the left ankle were  obtained.      No acute osseous abnormality.       Osteochondral lesion of the medial talar dome with probable  fragmentation. Mild to moderate tibiotalar joint degenerative change.  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     Ankle mortise and syndesmosis are congruent on this weight bearing  study. Plantar calcaneal heel spur.     Soft tissue is unremarkable.                                                                      Impression:  1. No acute osseous " abnormality.  2. Osteochondral lesion of the medial talar dome with probable  fragmentation. Mild to moderate tibiotalar joint degenerative change.   3. 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               ASSESSMENT and PLAN:     Nadira was seen today for recheck.    Diagnoses and all orders for this visit:    Rib pain on right side  -     CT Chest w/o Contrast; Future    Osteochondral lesion of talar dome    Primary osteoarthritis of left ankle    Grade 1 ankle sprain    71-year-old female presenting to clinic today for follow-up of the rib pain as well as the left ankle pain.  The overall presentation of the right rib is concerning for an occult fracture given the patient's low bone mineral density status as well as her ongoing pain and symptomatology.  The question of whether or not this is indeed a rib fracture should be evaluated given her history of low bone mineral density as well as current medication selection.  I have ordered a CT of the right rib for further evaluation.  I will contact the patient after this has been obtained and will message her bone health provider if there is indeed a fracture given the fragility nature of it.    In terms of the left ankle, I do believe most of her pain is coming from the talar dome where there is an osteochondral defect noted with mild fragmentation.  Because of this, we have previously put an order in for orthopedic foot and ankle for further evaluation given this is an adult with an OCD lesion.  I also think she has a concomitant grade 1 ankle sprain involving the ATF.  Patient is not having any instability noted about the lateral ankle and does not want to wear any more bracing.  Because of this, we have given her activity modification as well as instructions on range of motion rehab at this time.  Can embark on strengthening after the pain has resolved.  Patient should follow-up with orthopedic foot and ankle surgeon for evaluation of the OCD lesion.  If  nonsurgical management is recommended, I am more than happy to see the patient back.      Options for treatment and/or follow-up care were reviewed with the patient was actively involved in the decision making process. Patient verbalized understanding and was in agreement with the plan.    Rolan Morgan DO  , Sports Medicine  Department of Family Medicine and [facility]

## 2022-01-24 NOTE — LETTER
1/24/2022      RE: Nadira Perez  [address]  [facility] [address]       [facility]  Sports Medicine Clinic  Clinics and Surgery Center           SUBJECTIVE       Nadira Perez is a 71 year old female presenting to clinic today for a f/u of the left ankle pain.    1/17/22: After discussing in total with the patient the x-ray findings of the left ankle, she does have some concerns for tarsal coalition as well as degenerative changes noted about the ankle.  Most notable, she does have an osteochondral defect along the medial talar dome.  Patient has been having difficulty walking but has noticed more pain on the lateral aspect of her ankle.  There are no acute osseous abnormalities noted on the fibular component of the ankle.  However, given the patient's age as well as his comorbidities including low bone mineral density, the stability of this particular lesion could be in question which is also noted for possible fragmenting on plain x-rays.  Given her overall picture, staging of this particular lesion would be done with MRI, did discuss this with the patient.  However given that she has an OCD lesion that is newly diagnosed in an adult, we have also discussed referral to orthopedic surgery for further evaluation and management.  Patient is on board with seeing one of our foot and ankle specialist given this finding.  I am more than happy to see the patient back if nonoperative management is indicated after seeing foot and ankle surgical specialist.    Interval hx: Patient is overall doing roughly the same.  She still notices rib pain with coughing or sneezing on the right.  She is requesting to have a CT done given her history of osteoporosis.  In terms of her ankle, she is not having much ambulation pain.  But she does notice pain in the anterior medial and anterior lateral aspect of the ankle when she pushes on it or with certain movements, dorsiflexion.  She is not having any  numbness or tingling of the foot.  No recent trauma again or new injuries.    PMH, Medications and Allergies were reviewed and updated as needed.    ROS:  As noted above otherwise negative.    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     Corneal opacity     Hypothyroidism     Osteopenia, unspecified location     Aromatase inhibitor use     Chronic pain of right knee     DDD (degenerative disc disease), lumbar     Degenerative scoliosis in adult patient     Right carpal tunnel syndrome     Peripheral neuropathy     Spinal stenosis of lumbar region with neurogenic claudication     Spondylolisthesis of lumbar region     Brain lesion     Pain in both hands     S/P wrist surgery       Current Outpatient Medications   Medication Sig Dispense Refill     gabapentin (NEURONTIN) 300 MG capsule Take 1-2 capsules by mouth every 8 hours (Patient taking differently: Take 900 mg by mouth 3 times daily ) 540 capsule 3     acetaminophen (TYLENOL) 325 MG tablet Take 2 tablets (650 mg) by mouth every 4 hours as needed for other 60 tablet 0     amLODIPine (NORVASC) 10 MG tablet Take 1 tablet (10 mg) by mouth daily (Patient taking differently: Take 10 mg by mouth daily Takes around 10 AM.) 90 tablet 3     Ascorbic Acid (VITAMIN C) 500 MG CHEW Take 1 tablet by mouth daily       atorvastatin (LIPITOR) 80 MG tablet Take 1 tablet (80 mg) by mouth daily (Patient taking differently: Take 80 mg by mouth every evening ) 90 tablet 3      Cholecalciferol (VITAMIN D3) 50 MCG (2000 UT) CAPS Take 6,000 Units by mouth daily (Patient taking differently: Take 6,000 Units by mouth daily (with lunch) ) 270 capsule 3     CRANBERRY EXTRACT PO Take 650 mg by mouth daily ~10 am  Takes 2       diclofenac (VOLTAREN) 1 % topical gel APPLY 4 GRAMS TO KNEES OR 2 GRAMS TO HANDS FOUR TIMES DAILY USING ENCLOSED DOSING CARD. 100 g 3     HYDROcodone-acetaminophen (NORCO) 5-325 MG tablet Take 1 tablet by mouth every 6 hours as needed for severe pain Uses about 10 tablets per month.       hydrOXYzine (ATARAX) 10 MG tablet Take 1 tablet (10 mg) by mouth 2 times daily as needed for itching (pain adjuvant) 30 tablet 0     ibuprofen (ADVIL/MOTRIN) 600 MG tablet Take 1 tablet (600 mg) by mouth every 6 hours as needed for other (mild and/or inflammatory pain) 30 tablet 0     levothyroxine (SYNTHROID/LEVOTHROID) 112 MCG tablet TAKE 1/2 TABLET BY MOUTH DAILY 45 tablet 3     lisinopril (ZESTRIL) 40 MG tablet Take 1 tablet (40 mg) by mouth daily 90 tablet 3     melatonin 5 MG tablet Take 10 mg by mouth At Bedtime        methocarbamol (ROBAXIN) 500 MG tablet Take 1 tablet (500 mg) by mouth 3 times daily 40 tablet 0     metoprolol succinate ER (TOPROL-XL) 50 MG 24 hr tablet Take 1 tablet (50 mg) by mouth daily (Patient taking differently: Take 50 mg by mouth every morning ) 90 tablet 3     Multiple Vitamin (MULTI-VITAMIN) per tablet Take 1 tablet by mouth daily (with lunch)        ondansetron (ZOFRAN-ODT) 4 MG ODT tab Take 1 tablet (4 mg) by mouth every 12 hours as needed for nausea 180 tablet 3     polyethylene glycol (MIRALAX) 17 g packet Take 17 g by mouth 2 times daily (Patient taking differently: Take 17 g by mouth 2 times daily as needed ) 7 packet 0     senna-docusate (SENOKOT-S/PERICOLACE) 8.6-50 MG tablet Take 2 tablets by mouth 2 times daily 30 tablet 0     spironolactone (ALDACTONE) 25 MG tablet Take 2 tablets (50 mg) by mouth daily 90 tablet 3     terbinafine (LAMISIL AT) 1  "% external cream Apply topically 2 times daily For fungal infection not resolved with other antifungals (e.g. Clotrimazole) (Patient taking differently: Apply topically 2 times daily as needed (toenail fungus) For fungal infection not resolved with other antifungals (e.g. Clotrimazole)) 24 g 11     triamcinolone (KENALOG) 0.1 % external ointment Apply topically 2 times daily (Patient taking differently: Apply topically 2 times daily as needed ) 30 g 11     Vaginal Lubricant (REPHRESH) GEL Place 2 g vaginally every 3 days 2 g 3            OBJECTIVE:       Vitals:   Vitals:    01/24/22 1340   Weight: 71.2 kg (157 lb)   Height: 1.702 m (5' 7\")     BMI: Body mass index is 24.59 kg/m .    Gen:  Well nourished and in no acute distress  HEENT: Extraocular movement intact  Neck: Supple  Pulm:  Breathing Comfortably. No increased respiratory effort.  Psych: Euthymic. Appropriately answers questions    MSK:  Right rib tenderness roughly the same from previous exam.  No bruising or edema in the area.  No palpable step-offs.    Left ankle without visible asymmetry.  No effusions, ecchymosis, or erythema.  Tenderness to palpation along the medial and lateral talar dome.  Tenderness to palpation on the anterior talofibular ligament as well.  Positive anterior drawer and talar tilt.  Negative dorsiflexion/eversion testing.      Study Result    Narrative & Impression   3 views left ankle radiographs 1/17/2022 3:13 PM     History: Injury of left ankle, subsequent encounter     Comparison: None     Findings:     Standing AP, oblique, and lateral  views of the left ankle were  obtained.      No acute osseous abnormality.       Osteochondral lesion of the medial talar dome with probable  fragmentation. Mild to moderate tibiotalar joint degenerative change.  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     Ankle mortise and syndesmosis are congruent on this weight bearing  study. Plantar calcaneal heel spur.     Soft tissue is unremarkable.             "                                                          Impression:  1. No acute osseous abnormality.  2. Osteochondral lesion of the medial talar dome with probable  fragmentation. Mild to moderate tibiotalar joint degenerative change.   3. 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               ASSESSMENT and PLAN:     Nadira was seen today for recheck.    Diagnoses and all orders for this visit:    Rib pain on right side  -     CT Chest w/o Contrast; Future    Osteochondral lesion of talar dome    Primary osteoarthritis of left ankle    Grade 1 ankle sprain    71-year-old female presenting to clinic today for follow-up of the rib pain as well as the left ankle pain.  The overall presentation of the right rib is concerning for an occult fracture given the patient's low bone mineral density status as well as her ongoing pain and symptomatology.  The question of whether or not this is indeed a rib fracture should be evaluated given her history of low bone mineral density as well as current medication selection.  I have ordered a CT of the right rib for further evaluation.  I will contact the patient after this has been obtained and will message her bone health provider if there is indeed a fracture given the fragility nature of it.    In terms of the left ankle, I do believe most of her pain is coming from the talar dome where there is an osteochondral defect noted with mild fragmentation.  Because of this, we have previously put an order in for orthopedic foot and ankle for further evaluation given this is an adult with an OCD lesion.  I also think she has a concomitant grade 1 ankle sprain involving the ATF.  Patient is not having any instability noted about the lateral ankle and does not want to wear any more bracing.  Because of this, we have given her activity modification as well as instructions on range of motion rehab at this time.  Can embark on strengthening after the pain has resolved.  Patient should  follow-up with orthopedic foot and ankle surgeon for evaluation of the OCD lesion.  If nonsurgical management is recommended, I am more than happy to see the patient back.      Options for treatment and/or follow-up care were reviewed with the patient was actively involved in the decision making process. Patient verbalized understanding and was in agreement with the plan.    Rolan Morgan DO  , Sports Medicine  Department of Family [facility]

## 2022-01-25 ENCOUNTER — HOSPITAL ENCOUNTER (OUTPATIENT)
Dept: CT IMAGING | Facility: CLINIC | Age: 72
Discharge: HOME | End: 2022-01-25
Attending: STUDENT IN AN ORGANIZED HEALTH CARE EDUCATION/TRAINING PROGRAM | Admitting: STUDENT IN AN ORGANIZED HEALTH CARE EDUCATION/TRAINING PROGRAM
Payer: COMMERCIAL

## 2022-01-25 ENCOUNTER — THERAPY VISIT (OUTPATIENT)
Dept: OCCUPATIONAL THERAPY | Facility: CLINIC | Age: 72
End: 2022-01-25
Payer: OTHER MISCELLANEOUS

## 2022-01-25 DIAGNOSIS — M79.641 PAIN IN BOTH HANDS: ICD-10-CM

## 2022-01-25 DIAGNOSIS — Z98.890 S/P WRIST SURGERY: ICD-10-CM

## 2022-01-25 DIAGNOSIS — R07.81 RIB PAIN ON RIGHT SIDE: ICD-10-CM

## 2022-01-25 DIAGNOSIS — M54.16 LUMBAR RADICULOPATHY: Primary | ICD-10-CM

## 2022-01-25 DIAGNOSIS — M79.642 PAIN IN BOTH HANDS: ICD-10-CM

## 2022-01-25 DIAGNOSIS — G56.01 RIGHT CARPAL TUNNEL SYNDROME: ICD-10-CM

## 2022-01-25 PROCEDURE — 97140 MANUAL THERAPY 1/> REGIONS: CPT | Mod: GO | Performed by: OCCUPATIONAL THERAPIST

## 2022-01-25 PROCEDURE — 97110 THERAPEUTIC EXERCISES: CPT | Mod: GO | Performed by: OCCUPATIONAL THERAPIST

## 2022-01-25 PROCEDURE — 71250 CT THORAX DX C-: CPT

## 2022-01-25 NOTE — TELEPHONE ENCOUNTER
RECORDS RECEIVED FROM: OCD lesion of medial talar dome w/possible fragmentation/ Dr KITTY Morgan/ XR/ [employer]/ ortho con   DATE RECEIVED: Feb 23, 2022     NOTES STATUS DETAILS   OFFICE NOTE from referring provider Internal  Rolan Morgan DO          MEDICATION LIST Internal    MRI Internal 9/4/21   CT SCAN Internal 12/21/21   XRAYS (IMAGES & REPORTS) Internal 2/1/22  MORE INTERNAL

## 2022-01-26 ENCOUNTER — VIRTUAL VISIT (OUTPATIENT)
Dept: ORTHOPEDICS | Facility: CLINIC | Age: 72
End: 2022-01-26
Payer: COMMERCIAL

## 2022-01-26 DIAGNOSIS — M80.08XG PATHOLOGICAL FRACTURE OF VERTEBRA DUE TO AGE-RELATED OSTEOPOROSIS WITH DELAYED HEALING, SUBSEQUENT ENCOUNTER: Primary | ICD-10-CM

## 2022-01-26 PROCEDURE — 99213 OFFICE O/P EST LOW 20 MIN: CPT | Mod: 95 | Performed by: FAMILY MEDICINE

## 2022-01-26 NOTE — PROGRESS NOTES
Ismael is a 71 year old who is being evaluated via a billable telephone visit.    [employer], [employer], [employer] today.  Got home in time for appt.  Got CT results yesterday- rib fracture, possible second.  Leaning over trash bin and heard a pop.  Concern about greater risk regarding her bones because hasn't had the fracture issues and now she seems to have them.  Vit D 6000 international unit(s), centrum vitamin- has 1000 international unit(s) daily  Dr. Beau Morgan, seen for rib and ankle.  Wants her to see Ortho.    Never had MI and stroke  Forteo and Tymlos not a candidate Due to RADIATION hx  On Fosamax and Zometa in the past  March- no infections, no dental procedures planned  Exostosis- in the past, gum lining gets irritated    Assessment and plan: Patient is a 71-year-old female with past medical history of osteoporosis, spinal stenosis of the lumbar region, carpal tunnel, degenerative scoliosis presenting to follow-up on her bone health labs and DEXA  1.  Osteoporosis-we will put in prior authorization for Evenity  Patient is not a candidate for Forteo and Tymlos given her history of radiation  Her last Zometa was in November    If able to get Evenity, would consider Reclast after 1 year of injections of Evenity.    What phone number would you like to be contacted at? [phone]    How would you like to obtain your AVS? [facility]  Phone call duration: 15 minutes

## 2022-01-26 NOTE — LETTER
1/26/2022       RE: Nadira Perez  [address]  [facility] [address]     Dear Colleague,    Thank you for referring your patient, Nadira Perez, to the [facility] [place] at [facility]. Please see a copy of my visit note below.    Ismael is a 71 year old who is being evaluated via a billable telephone visit.    [employer], [place], [employer] today.  Got home in time for appt.  Got CT results yesterday- rib fracture, possible second.  Leaning over trash bin and heard a pop.  Concern about greater risk regarding her bones because hasn't had the fracture issues and now she seems to have them.  Vit D 6000 international unit(s), centrum vitamin- has 1000 international unit(s) daily  Dr. Beau Morgan, seen for rib and ankle.  Wants her to see Ortho.    Never had MI and stroke  Forteo and Tymlos not a candidate Due to RADIATION hx  On Fosamax and Zometa in the past  March- no infections, no dental procedures planned  Exostosis- in the past, gum lining gets irritated    Assessment and plan: Patient is a 71-year-old female with past medical history of osteoporosis, spinal stenosis of the lumbar region, carpal tunnel, degenerative scoliosis presenting to follow-up on her bone health labs and DEXA  1.  Osteoporosis-we will put in prior authorization for Evenity  Patient is not a candidate for Forteo and Tymlos given her history of radiation  Her last Zometa was in November    What phone number would you like to be contacted at? [phone]    How would you like to obtain your AVS? [employer]  Phone call duration: 15 minutes        Again, thank you for allowing me to participate in the care of your patient.      Sincerely,    Cortney Clark MD

## 2022-01-26 NOTE — LETTER
1/26/2022      RE: Nadira L Perez  [address]  [facility] [address]       Ismael is a 71 year old who is being evaluated via a billable telephone visit.    [employer]atory [employer], [place], [employer] today.  Got home in time for appt.  Got CT results yesterday- rib fracture, possible second.  Leaning over trash bin and heard a pop.  Concern about greater risk regarding her bones because hasn't had the fracture issues and now she seems to have them.  Vit D 6000 international unit(s), centrum vitamin- has 1000 international unit(s) daily  Dr. Beau Morgan, seen for rib and ankle.  Wants her to see Ortho.    Never had MI and stroke  Forteo and Tymlos not a candidate Due to RADIATION hx  On Fosamax and Zometa in the past  March- no infections, no dental procedures planned  Exostosis- in the past, gum lining gets irritated    Assessment and plan: Patient is a 71-year-old female with past medical history of osteoporosis, spinal stenosis of the lumbar region, carpal tunnel, degenerative scoliosis presenting to follow-up on her bone health labs and DEXA  1.  Osteoporosis-we will put in prior authorization for Evenity  Patient is not a candidate for Forteo and Tymlos given her history of radiation  Her last Zometa was in November    What phone number would you like to be contacted at? [phone]    How would you like to [facility] your [facility]? [facility]  Phone call duration: 15 minutes        Cortney Clark MD

## 2022-02-01 ENCOUNTER — OFFICE VISIT (OUTPATIENT)
Dept: ORTHOPEDICS | Facility: CLINIC | Age: 72
End: 2022-02-01
Payer: COMMERCIAL

## 2022-02-01 ENCOUNTER — ANCILLARY PROCEDURE (OUTPATIENT)
Dept: GENERAL RADIOLOGY | Facility: CLINIC | Age: 72
End: 2022-02-01
Attending: ORTHOPAEDIC SURGERY
Payer: COMMERCIAL

## 2022-02-01 ENCOUNTER — THERAPY VISIT (OUTPATIENT)
Dept: OCCUPATIONAL THERAPY | Facility: CLINIC | Age: 72
End: 2022-02-01
Payer: COMMERCIAL

## 2022-02-01 DIAGNOSIS — S32.010G COMPRESSION FRACTURE OF L1 VERTEBRA WITH DELAYED HEALING, SUBSEQUENT ENCOUNTER: ICD-10-CM

## 2022-02-01 DIAGNOSIS — Z98.890 S/P WRIST SURGERY: ICD-10-CM

## 2022-02-01 DIAGNOSIS — G56.01 RIGHT CARPAL TUNNEL SYNDROME: ICD-10-CM

## 2022-02-01 DIAGNOSIS — M79.641 PAIN IN BOTH HANDS: ICD-10-CM

## 2022-02-01 DIAGNOSIS — M79.642 PAIN IN BOTH HANDS: ICD-10-CM

## 2022-02-01 DIAGNOSIS — M54.16 LUMBAR RADICULOPATHY: Primary | ICD-10-CM

## 2022-02-01 PROCEDURE — 97140 MANUAL THERAPY 1/> REGIONS: CPT | Mod: GO | Performed by: OCCUPATIONAL THERAPIST

## 2022-02-01 PROCEDURE — 72100 X-RAY EXAM L-S SPINE 2/3 VWS: CPT | Mod: GC | Performed by: RADIOLOGY

## 2022-02-01 PROCEDURE — 97110 THERAPEUTIC EXERCISES: CPT | Mod: GO | Performed by: OCCUPATIONAL THERAPIST

## 2022-02-01 PROCEDURE — 99024 POSTOP FOLLOW-UP VISIT: CPT | Mod: GC | Performed by: ORTHOPAEDIC SURGERY

## 2022-02-01 ASSESSMENT — ENCOUNTER SYMPTOMS
FLANK PAIN: 0
DYSURIA: 0
SYNCOPE: 0
NIGHT SWEATS: 0
DEPRESSION: 0
NAUSEA: 1
SPEECH CHANGE: 0
SINUS CONGESTION: 0
EYE WATERING: 0
FEVER: 0
JOINT SWELLING: 1
NAUSEA: 1
POLYPHAGIA: 0
VOMITING: 0
BOWEL INCONTINENCE: 0
JAUNDICE: 0
HOARSE VOICE: 0
ARTHRALGIAS: 1
POLYDIPSIA: 0
BLOATING: 1
JOINT SWELLING: 1
BLOOD IN STOOL: 0
NECK MASS: 0
ABDOMINAL PAIN: 1
MUSCLE CRAMPS: 1
SMELL DISTURBANCE: 0
POLYPHAGIA: 0
HOT FLASHES: 0
STIFFNESS: 1
HEADACHES: 0
DIARRHEA: 0
TREMORS: 0
PARALYSIS: 0
BRUISES/BLEEDS EASILY: 0
CHILLS: 0
DYSURIA: 0
SINUS PAIN: 0
TINGLING: 1
DOUBLE VISION: 0
HYPOTENSION: 0
WEIGHT GAIN: 0
TROUBLE SWALLOWING: 1
DOUBLE VISION: 0
DECREASED CONCENTRATION: 0
LEG PAIN: 1
CONSTIPATION: 1
EYE IRRITATION: 0
INSOMNIA: 1
SPEECH CHANGE: 0
STIFFNESS: 1
DECREASED CONCENTRATION: 0
TREMORS: 0
HEMATURIA: 0
SMELL DISTURBANCE: 0
NERVOUS/ANXIOUS: 0
SWOLLEN GLANDS: 0
CHILLS: 0
SEIZURES: 0
MEMORY LOSS: 0
PARALYSIS: 0
FLANK PAIN: 0
FATIGUE: 0
ARTHRALGIAS: 1
SLEEP DISTURBANCES DUE TO BREATHING: 0
HEARTBURN: 1
PANIC: 0
LOSS OF CONSCIOUSNESS: 0
INSOMNIA: 1
EYE PAIN: 0
HYPOTENSION: 0
ABDOMINAL PAIN: 1
HYPERTENSION: 1
DIZZINESS: 1
DECREASED APPETITE: 0
DIFFICULTY URINATING: 1
SEIZURES: 0
NERVOUS/ANXIOUS: 0
HEARTBURN: 1
FEVER: 0
DECREASED APPETITE: 0
EXERCISE INTOLERANCE: 1
DECREASED LIBIDO: 1
DIARRHEA: 0
BACK PAIN: 1
SLEEP DISTURBANCES DUE TO BREATHING: 0
HALLUCINATIONS: 0
DEPRESSION: 0
PALPITATIONS: 1
INCREASED ENERGY: 0
MYALGIAS: 1
SORE THROAT: 0
HOARSE VOICE: 0
BOWEL INCONTINENCE: 0
HOT FLASHES: 0
EXERCISE INTOLERANCE: 1
DISTURBANCES IN COORDINATION: 1
EYE PAIN: 0
BLOOD IN STOOL: 0
MUSCLE WEAKNESS: 1
BLOATING: 1
DIZZINESS: 1
EYE IRRITATION: 0
SORE THROAT: 0
CONSTIPATION: 1
ORTHOPNEA: 0
MUSCLE CRAMPS: 1
HEADACHES: 0
NECK PAIN: 1
BACK PAIN: 1
LOSS OF CONSCIOUSNESS: 0
POLYDIPSIA: 0
SINUS CONGESTION: 0
BRUISES/BLEEDS EASILY: 0
NUMBNESS: 1
WEIGHT LOSS: 0
SINUS PAIN: 0
SWOLLEN GLANDS: 0
EYE WATERING: 0
MUSCLE WEAKNESS: 1
WEAKNESS: 1
RECTAL PAIN: 0
TASTE DISTURBANCE: 0
VOMITING: 0
LIGHT-HEADEDNESS: 1
RECTAL PAIN: 0
HEMATURIA: 0
HYPERTENSION: 1
LEG PAIN: 1
LIGHT-HEADEDNESS: 1
MEMORY LOSS: 0
FATIGUE: 0
SYNCOPE: 0
DISTURBANCES IN COORDINATION: 1
DECREASED LIBIDO: 1
WEIGHT GAIN: 0
ALTERED TEMPERATURE REGULATION: 0
JAUNDICE: 0
PALPITATIONS: 1
MYALGIAS: 1
TINGLING: 1
TROUBLE SWALLOWING: 1
TASTE DISTURBANCE: 0
INCREASED ENERGY: 0
DIFFICULTY URINATING: 1
ORTHOPNEA: 0
NUMBNESS: 1
WEIGHT LOSS: 0
EYE REDNESS: 0
HALLUCINATIONS: 0
NIGHT SWEATS: 0
NECK MASS: 0
ALTERED TEMPERATURE REGULATION: 0
PANIC: 0
NECK PAIN: 1
WEAKNESS: 1
EYE REDNESS: 0

## 2022-02-01 NOTE — PROGRESS NOTES
Spine Surgery Return Clinic Visit      Chief Complaint:   Surgical Followup (Part 1: Oblique Anterior Interbody Fusion at Lumbar 5 to sacral 1 with use of Bone Morphogenic Protein, Open Posterior Instrumented Spinal Fusion at Lumbar 5 to Sacral 1, with grimm aguayo osteotomy, use of O-Arm/Stealth, DOS: 11-.) and Follow Up (1 month follow up from fall. )      Interval HPI:  Symptom Profile Including: location of symptoms, onset, severity, exacerbating/alleviating factors, previous treatments:        Nadira Perez is a 71 year old female who presents to clinic for follow-up of L5-S1 interbody fusion with decompression on 11/18/2021 complicated by a fall 1 month ago where she was found to have osteoporotic fractures around the L5 pedicle screws as well as sacral insufficiency fractures.  Plans at that time were for continued monitoring to see if this fracture will heal with conservative management.    Today, she feels that the pain in her lumbar spine is starting to make progress. She can stand and walk for longer than she could have previously. She also states that her radiation is much improved, however she has some hypersensitivity in her right lateral leg to the touch.                 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     Thyroid disease             Past Surgical History:     Past Surgical History:   Procedure Laterality Date     ABDOMEN SURGERY      ovarian cyst, mesh     ARTHRODESIS WRIST Right      ARTHRODESIS WRIST  02/14/2013    Procedure: ARTHRODESIS  WRIST;  left wrist scaphoid excision, four bone fusion, iliac crest bone graft  ( Mac with block);  Surgeon: Av Mendez MD;  [facility]     BIOPSY      skin, vaginal     CATARACT IOL, RT/LT Right 03/13/2018     CATARACT IOL, RT/LT Left 02/20/2018     COLONOSCOPY  12/24/2013    Procedure: COMBINED COLONOSCOPY, SINGLE BIOPSY/POLYPECTOMY BY BIOPSY;  COLONOSCOPY;  Surgeon: Dom Alvarez MD;  Location:  GI     ESOPHAGOSCOPY, GASTROSCOPY, DUODENOSCOPY (EGD), COMBINED N/A 11/23/2016    Procedure: COMBINED ESOPHAGOSCOPY, GASTROSCOPY, DUODENOSCOPY (EGD);  Surgeon: Quinten Feliciano MD;  Location:  GI     EXTERNAL EAR SURGERY      right     EYE SURGERY      radial keratomy     FUSION, SPINE, INTERBODY, OBLIQUE ANTERIOR AND LUMBAR, 2 LEVELS, POST APPROACH, USING OTS N/A 11/18/2021    Procedure: Part 1: Oblique Anterior Interbody Fusion at Lumbar 5 to sacral 1 with use of Bone Morphogenic Protein,;  Surgeon: Serge Ramirez MD;  [facility]     GRAFT BONE FROM ILIAC CREST  02/14/2013    Procedure: GRAFT BONE FROM ILIAC CREST;  mac with block and local infilitration;  Surgeon: Av Mendez MD;  [facility]      BREATH HYDROGEN TEST N/A 10/14/2016    Procedure: HYDROGEN BREATH TEST;  Surgeon: Cheri Barron MD;  Location:  GI     HERNIA REPAIR      umbilical age 18 mos.     HYSTERECTOMY TOTAL ABDOMINAL  05/03/2000     MASTECTOMY MODIFIED RADICAL Bilateral     bilateral; right breast prophylactic     OPTICAL TRACKING SYSTEM FUSION POSTERIOR SPINE LUMBAR N/A 11/18/2021    Procedure: Open Posterior Instrumented Spinal Fusion at Lumbar 5 to Sacral 1, with grimm aguayo osteotomy, use of O-Arm/Stealth;  Surgeon: Serge Ramirez MD;  [facility]     PARATHYROIDECTOMY  09/23/2004    R SUPERIOR     PARATHYROIDECTOMY  09/23/2004    parathyroid resection, subtotal     RELEASE CARPAL TUNNEL Right 12/2/2021    Procedure: RIGHT CARPAL TUNNEL RELEASE,  RIGHT WRIST HARDWARE REMOVAL, RIGHT CARPAL BOSS EXCISION;  Surgeon: Rolan Conley MD;  Location: [facility] OR     REMOVE HARDWARE HAND  09/24/2013    Procedure: REMOVE HARDWARE HAND;  Left Hand Screw Removal        RHINOPLASTY  1968     thyr proc skin closed cosmetic manner by subcuticular stitch  01/23/2009     THYROPLASTY  10/09/2009     TONSILLECTOMY  1977     WRIST SURGERY      wrist arthrodesis            Social History:     Social History     Tobacco Use     Smoking status: Never Smoker     Smokeless tobacco: Never Used   Substance Use Topics     Alcohol use: Not Currently     Alcohol/week: 7.0 standard drinks     Types: 7 Glasses of wine per week     Comment: Rare to occasional            Family History:     Family History   Problem Relation Age of Onset     Neurologic Disorder Mother         Anuerysm of Cerebral Artery, Dementia     Diabetes Mother      Thyroid Disease Mother         ,     Cerebrovascular Disease Mother      Dementia Mother      Osteoporosis Mother      Heart Disease Father         AAA     Hypertension Father      Circulatory Brother         Perihperal Neurophathy     Diabetes Maternal Grandmother      Asthma Maternal Grandmother      Chronic Obstructive Pulmonary Disease Maternal Grandfather         father     Asthma Maternal Grandfather      Diabetes Maternal Aunt         x2     Melanoma Maternal Aunt      Glaucoma Maternal Aunt      Breast Cancer Cousin      Dementia Other      Cancer Other         malignant melanoma     Hypertension Other      Hypertension Other      Cerebrovascular Disease Other      Cerebrovascular Disease Other      Obesity Other      Respiratory Other      Cancer Other      Diabetes Other      Asthma Other      Macular Degeneration No family hx of      Coronary Artery Disease No family hx of      Hyperlipidemia No family hx of      Kidney Disease No family hx of      Thrombosis No family hx of      Arthritis No family hx of      Depression No family hx of      Mental  Illness No family hx of      Substance Abuse No family hx of      Cystic Fibrosis No family hx of      Early Death No family hx of      Coronary Artery Disease Early Onset No family hx of      Heart Failure No family hx of      Bleeding Diathesis No family hx of      Ovarian Cancer No family hx of      Uterine Cancer No family hx of      Prostate Cancer No family hx of      Colorectal Cancer No family hx of      Pancreatic Cancer No family hx of      Lung Cancer No family hx of      Other Cancer No family hx of      Autoimmune Disease No family hx of      Unknown/Adopted No family hx of      Genetic Disorder No family hx of      Bleeding Disorder No family hx of      Clotting Disorder No family hx of      Anesthesia Reaction No family hx of             Allergies:     Allergies   Allergen Reactions     Erythromycin Nausea     Penicillins Hives     Around age 6 - doesn't recall full reaction, mother told her it was hives.     Has tolerated cephalsporins.             Medications:     Current Outpatient Medications   Medication     acetaminophen (TYLENOL) 325 MG tablet     amLODIPine (NORVASC) 10 MG tablet     Ascorbic Acid (VITAMIN C) 500 MG CHEW     atorvastatin (LIPITOR) 80 MG tablet     Cholecalciferol (VITAMIN D3) 50 MCG (2000 UT) CAPS     CRANBERRY EXTRACT PO     diclofenac (VOLTAREN) 1 % topical gel     gabapentin (NEURONTIN) 300 MG capsule     HYDROcodone-acetaminophen (NORCO) 5-325 MG tablet     hydrOXYzine (ATARAX) 10 MG tablet     ibuprofen (ADVIL/MOTRIN) 600 MG tablet     levothyroxine (SYNTHROID/LEVOTHROID) 112 MCG tablet     lisinopril (ZESTRIL) 40 MG tablet     melatonin 5 MG tablet     methocarbamol (ROBAXIN) 500 MG tablet     metoprolol succinate ER (TOPROL-XL) 50 MG 24 hr tablet     Multiple Vitamin (MULTI-VITAMIN) per tablet     ondansetron (ZOFRAN-ODT) 4 MG ODT tab     polyethylene glycol (MIRALAX) 17 g packet     senna-docusate (SENOKOT-S/PERICOLACE) 8.6-50 MG tablet     spironolactone (ALDACTONE)  25 MG tablet     terbinafine (LAMISIL AT) 1 % external cream     triamcinolone (KENALOG) 0.1 % external ointment     Vaginal Lubricant (REPHRESH) GEL     No current facility-administered medications for this visit.             Review of Systems:   A focused musculoskeletal and neurologic ROS was performed with pertinent positives and negatives noted in the HPI.  Additional systems were also reviewed and are documented at the bottom of the note.         Physical Exam:   Vitals: There were no vitals taken for this visit.  Musculoskeletal, Neurologic, and Spine:        Lumbar Spine:    Appearance - No gross stepoffs or deformities    Motor -     L2-3: Hip flexion 5/5 R and 5/5 L strength          L3/4:  Knee extension R 5/5 and L 5/5 strength         L4/5:  Foot dorsiflexion R 3/5 L 4/5 and       EHL dorsiflexion R 3/5 L 4/5 strength         S1:  Plantarflexion/Peroneal Muscles  R 5/5 and L 5/5 strength    Sensation: intact to light touch L3-S1 distribution BLE      Neurologic:      REFLEXES Left Right   Biceps 2+ 2+   Triceps 2+ 2+   Brachioradialis 2+ 2+   Patella 2+ 2+   Ankle jerk 2+ 2+   Babinski No upgoing great toe No upgoing great toe   Clonus 0 beats 0 beats     Hip Exam:  No pain with hip log roll and no tenderness over the greater trochanters.    Alignment:  Patient stands with a neutral standing sagittal balance.           Imaging:   We ordered and independently reviewed new radiographs at this clinic visit. The results were discussed with the patient. Findings include:     XR lumbar spine 2/1/2022: Stable compared to prior no osseous abnormalities noted on x-ray.       Assessment and Plan:     71 year old female s/p L5-S1 fusion complicated by fall with associated fracture around L5 pedicle screws and sacral insufficiency fracture.  Fractures are small and cannot be noted on x-ray.  They were seen with CT.  Her improving clinical picture with resolved radicular symptoms and improving back pain strongly  supports healing fractures.  Plan to continue to manage these fractures conservatively for the time being.  She will be monitored closely with follow-up in 3 months and repeat x-rays at that time.  She was instructed to reach out if her symptoms worsen.    Plan:  -Follow-up in 3 months with repeat lumbar x-ray  -Follow-up with primary care provider regarding continued pain management     Attending MD (Dr. Serge Ramirez) :  I reviewed and verified the history and physical exam of the patient and discussed the patient's management with the other clinical providers involved in this patient's care including any involved residents or physicians assistants. I reviewed the above note and agree with the documented findings and plan of care, which were communicated to the patient.      Serge Ramirez MD    --  Medhat Lynch MD  Orthopedic Surgery PGY-1    Patient seen and discussed with Dr. Ramirez.

## 2022-02-01 NOTE — LETTER
2/1/2022         RE: Nadira Perez  [address]  [facility] [id number]        Dear Colleague,    Thank you for referring your patient, Nadira Perez, to the [facility] [place]. Please see a copy of my visit note below.    Spine Surgery Return Clinic Visit      Chief Complaint:   Surgical Followup (Part 1: Oblique Anterior Interbody Fusion at Lumbar 5 to sacral 1 with use of Bone Morphogenic Protein, Open Posterior Instrumented Spinal Fusion at Lumbar 5 to Sacral 1, with grimm aguayo osteotomy, use of O-Arm/Stealth, DOS: 11-.) and Follow Up (1 month follow up from fall. )      Interval HPI:  Symptom Profile Including: location of symptoms, onset, severity, exacerbating/alleviating factors, previous treatments:        Nadira Perez is a 71 year old female who presents to clinic for follow-up of L5-S1 interbody fusion with decompression on 11/18/2021 complicated by a fall 1 month ago where she was found to have osteoporotic fractures around the L5 pedicle screws as well as sacral insufficiency fractures.  Plans at that time were for continued monitoring to see if this fracture will heal with conservative management.    Today, she feels that the pain in her lumbar spine is starting to make progress. She can stand and walk for longer than she could have previously. She also states that her radiation is much improved, however she has some hypersensitivity in her right lateral leg to the touch.                 Past Medical History:     Past Medical History:   Diagnosis Date     Arthritis      Bone disease      Breast cancer (H)      H/O kyphoplasty      Hearing problem      History of kidney stones      History of radiation therapy      Hyperlipemia      Hypertension      Hypopotassemia      Kidney problem      Lymph edema      Medullary sponge kidney      Osteopenia      PONV (postoperative nausea and vomiting)      Reduced vision      Squamous cell skin  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     Thyroid disease             Past Surgical History:     Past Surgical History:   Procedure Laterality Date     ABDOMEN SURGERY      ovarian cyst, mesh     ARTHRODESIS WRIST Right      ARTHRODESIS WRIST  02/14/2013    Procedure: ARTHRODESIS WRIST;  left wrist scaphoid excision, four bone fusion, iliac crest bone graft  ( Mac with block);  Surgeon: Av Mendez MD;  [facility]     BIOPSY      skin, vaginal     CATARACT IOL, RT/LT Right 03/13/2018     CATARACT IOL, RT/LT Left 02/20/2018     COLONOSCOPY  12/24/2013    Procedure: COMBINED COLONOSCOPY, SINGLE BIOPSY/POLYPECTOMY BY BIOPSY;  COLONOSCOPY;  Surgeon: Dom Alvarez MD;  Location:  GI     ESOPHAGOSCOPY, GASTROSCOPY, DUODENOSCOPY (EGD), COMBINED N/A 11/23/2016    Procedure: COMBINED ESOPHAGOSCOPY, GASTROSCOPY, DUODENOSCOPY (EGD);  Surgeon: Quinten Feliciano MD;  Location:  GI     EXTERNAL EAR SURGERY      right     EYE SURGERY      radial keratomy     FUSION, SPINE, INTERBODY, OBLIQUE ANTERIOR AND LUMBAR, 2 LEVELS, POST APPROACH, USING OTS N/A 11/18/2021    Procedure: Part 1: Oblique Anterior Interbody Fusion at Lumbar 5 to sacral 1 with use of Bone Morphogenic Protein,;  Surgeon: Serge Ramirez MD;  [facility]     GRAFT BONE FROM ILIAC CREST  02/14/2013    Procedure: GRAFT BONE FROM ILIAC CREST;  mac with block and local infilitration;  Surgeon: Av Mendez MD;  [facility]     HC BREATH HYDROGEN TEST N/A 10/14/2016    Procedure: HYDROGEN BREATH TEST;  Surgeon: Cheri Barron MD;  Location:  GI     HERNIA REPAIR      umbilical age 18 mos.     HYSTERECTOMY TOTAL ABDOMINAL  05/03/2000     MASTECTOMY MODIFIED RADICAL Bilateral     bilateral; right breast prophylactic     OPTICAL TRACKING SYSTEM FUSION POSTERIOR SPINE LUMBAR N/A 11/18/2021    Procedure: Open Posterior Instrumented Spinal Fusion at Lumbar 5 to Sacral 1, with grimm aguayo osteotomy, use of  O-Arm/Stealth;  Surgeon: Serge Ramirez MD;  [facility]     PARATHYROIDECTOMY  09/23/2004    R SUPERIOR     PARATHYROIDECTOMY  09/23/2004    parathyroid resection, subtotal     RELEASE CARPAL TUNNEL Right 12/2/2021    Procedure: RIGHT CARPAL TUNNEL RELEASE, RIGHT WRIST HARDWARE REMOVAL, RIGHT CARPAL BOSS EXCISION;  Surgeon: Rolan Conley MD;  Location: [facility] OR     REMOVE HARDWARE HAND  09/24/2013    Procedure: REMOVE HARDWARE HAND;  Left Hand Screw Removal        RHINOPLASTY  1968     thyr proc skin closed cosmetic manner by subcuticular stitch  01/23/2009     THYROPLASTY  10/09/2009     TONSILLECTOMY  1977     WRIST SURGERY      wrist arthrodesis            Social History:     Social History     Tobacco Use     Smoking status: Never Smoker     Smokeless tobacco: Never Used   Substance Use Topics     Alcohol use: Not Currently     Alcohol/week: 7.0 standard drinks     Types: 7 Glasses of wine per week     Comment: Rare to occasional            Family History:     Family History   Problem Relation Age of Onset     Neurologic Disorder Mother         Anuerysm of Cerebral Artery, Dementia     Diabetes Mother      Thyroid Disease Mother         ,     Cerebrovascular Disease Mother      Dementia Mother      Osteoporosis Mother      Heart Disease Father         AAA     Hypertension Father      Circulatory Brother         Perihperal Neurophathy     Diabetes Maternal Grandmother      Asthma Maternal Grandmother      Chronic Obstructive Pulmonary Disease Maternal Grandfather         father     Asthma Maternal Grandfather      Diabetes Maternal Aunt         x2     Melanoma Maternal Aunt      Glaucoma Maternal Aunt      Breast Cancer Cousin      Dementia Other      Cancer Other         malignant melanoma     Hypertension Other      Hypertension Other      Cerebrovascular Disease Other      Cerebrovascular Disease Other      Obesity Other      Respiratory Other      Cancer Other      Diabetes Other      Asthma  Other      Macular Degeneration No family hx of      Coronary Artery Disease No family hx of      Hyperlipidemia No family hx of      Kidney Disease No family hx of      Thrombosis No family hx of      Arthritis No family hx of      Depression No family hx of      Mental Illness No family hx of      Substance Abuse No family hx of      Cystic Fibrosis No family hx of      Early Death No family hx of      Coronary Artery Disease Early Onset No family hx of      Heart Failure No family hx of      Bleeding Diathesis No family hx of      Ovarian Cancer No family hx of      Uterine Cancer No family hx of      Prostate Cancer No family hx of      Colorectal Cancer No family hx of      Pancreatic Cancer No family hx of      Lung Cancer No family hx of      Other Cancer No family hx of      Autoimmune Disease No family hx of      Unknown/Adopted No family hx of      Genetic Disorder No family hx of      Bleeding Disorder No family hx of      Clotting Disorder No family hx of      Anesthesia Reaction No family hx of             Allergies:     Allergies   Allergen Reactions     Erythromycin Nausea     Penicillins Hives     Around age 6 - doesn't recall full reaction, mother told her it was hives.     Has tolerated cephalsporins.             Medications:     Current Outpatient Medications   Medication     acetaminophen (TYLENOL) 325 MG tablet     amLODIPine (NORVASC) 10 MG tablet     Ascorbic Acid (VITAMIN C) 500 MG CHEW     atorvastatin (LIPITOR) 80 MG tablet     Cholecalciferol (VITAMIN D3) 50 MCG (2000 UT) CAPS     CRANBERRY EXTRACT PO     diclofenac (VOLTAREN) 1 % topical gel     gabapentin (NEURONTIN) 300 MG capsule     HYDROcodone-acetaminophen (NORCO) 5-325 MG tablet     hydrOXYzine (ATARAX) 10 MG tablet     ibuprofen (ADVIL/MOTRIN) 600 MG tablet     levothyroxine (SYNTHROID/LEVOTHROID) 112 MCG tablet     lisinopril (ZESTRIL) 40 MG tablet     melatonin 5 MG tablet     methocarbamol (ROBAXIN) 500 MG tablet     metoprolol  succinate ER (TOPROL-XL) 50 MG 24 hr tablet     Multiple Vitamin (MULTI-VITAMIN) per tablet     ondansetron (ZOFRAN-ODT) 4 MG ODT tab     polyethylene glycol (MIRALAX) 17 g packet     senna-docusate (SENOKOT-S/PERICOLACE) 8.6-50 MG tablet     spironolactone (ALDACTONE) 25 MG tablet     terbinafine (LAMISIL AT) 1 % external cream     triamcinolone (KENALOG) 0.1 % external ointment     Vaginal Lubricant (REPHRESH) GEL     No current facility-administered medications for this visit.             Review of Systems:   A focused musculoskeletal and neurologic ROS was performed with pertinent positives and negatives noted in the HPI.  Additional systems were also reviewed and are documented at the bottom of the note.         Physical Exam:   Vitals: There were no vitals taken for this visit.  Musculoskeletal, Neurologic, and Spine:        Lumbar Spine:    Appearance - No gross stepoffs or deformities    Motor -     L2-3: Hip flexion 5/5 R and 5/5 L strength          L3/4:  Knee extension R 5/5 and L 5/5 strength         L4/5:  Foot dorsiflexion R 3/5 L 4/5 and       EHL dorsiflexion R 3/5 L 4/5 strength         S1:  Plantarflexion/Peroneal Muscles  R 5/5 and L 5/5 strength    Sensation: intact to light touch L3-S1 distribution BLE      Neurologic:      REFLEXES Left Right   Biceps 2+ 2+   Triceps 2+ 2+   Brachioradialis 2+ 2+   Patella 2+ 2+   Ankle jerk 2+ 2+   Babinski No upgoing great toe No upgoing great toe   Clonus 0 beats 0 beats     Hip Exam:  No pain with hip log roll and no tenderness over the greater trochanters.    Alignment:  Patient stands with a neutral standing sagittal balance.           Imaging:   We ordered and independently reviewed new radiographs at this clinic visit. The results were discussed with the patient. Findings include:     XR lumbar spine 2/1/2022: Stable compared to prior no osseous abnormalities noted on x-ray.       Assessment and Plan:     71 year old female s/p L5-S1 fusion complicated  by fall with associated fracture around L5 pedicle screws and sacral insufficiency fracture.  Fractures are small and cannot be noted on x-ray.  They were seen with CT.  Her improving clinical picture with resolved radicular symptoms and improving back pain strongly supports healing fractures.  Plan to continue to manage these fractures conservatively for the time being.  She will be monitored closely with follow-up in 3 months and repeat x-rays at that time.  She was instructed to reach out if her symptoms worsen.    Plan:  -Follow-up in 3 months with repeat lumbar x-ray  -Follow-up with primary care provider regarding continued pain management     Attending MD (Dr. Serge Ramirez) :  I reviewed and verified the history and physical exam of the patient and discussed the patient's management with the other clinical providers involved in this patient's care including any involved residents or physicians assistants. I reviewed the above note and agree with the documented findings and plan of care, which were communicated to the patient.      Serge Ramirez MD    --  Medhat Lynch MD  Orthopedic Surgery PGY-1    Patient seen and discussed with Dr. Ramirez.

## 2022-02-01 NOTE — NURSING NOTE
Chief Complaint   Patient presents with     Surgical Followup     Part 1: Oblique Anterior Interbody Fusion at Lumbar 5 to sacral 1 with use of Bone Morphogenic Protein, Open Posterior Instrumented Spinal Fusion at Lumbar 5 to Sacral 1, with grimm aguayo osteotomy, use of O-Arm/Stealth, DOS: 11-.     Follow Up     1 month follow up from fall.        71 year old  1950      Pain Assessment  Patient Currently in Pain: Yes  0-10 Pain Scale: 4  Primary Pain Location: Back (Low)        Patient requests: Vicodin refill, Muscle relaxer refill-patient stated that the tizanidine is working very well.      Oswestry Disability Index (JOHN   Nish Micky 1980 Version 2.1a, All rights reserved)  Section 1 - Pain intensity: (P) The pain is moderate at the moment.  Section 2 - Personal care (washing, dressing, etc.) : (P) I need some help but manage most of my personal care.  Section 3 - Lifting: (P) I cannot lift or carry anything at all.  Section 4 - Walking: (P) I can only walk using a cane or crutches.  Section 5 - Sitting: (P) Pain prevents me from sitting for more than half an hour.  Section 6 - Standing: (P) Pain prevents me from standing for more than 10 minutes.  Section 7 - Sleeping: (P) Because of pain I have less than 4 hours sleep.  Section 8 - Sex life (if applicable): (P) Not answered/NA  Section 9 - Social life: (P) Pain has restricted my social life and I do not go out as often.  Section 10 - Traveling: (P) Pain restricts me to short necessary trips of under 30 minutes.  Sum: (P) 31  Count: (P) 9  Oswestry Score (%): (P) 68.89 %            [facility]  [facility] [id number] [place] [facility] [address]  [address]        Allergies   Allergen Reactions     Erythromycin Nausea     Penicillins Hives     Around age 6 - doesn't recall full reaction, mother told her it was hives.     Has tolerated cephalsporins.            Current Outpatient Medications   Medication     acetaminophen  (TYLENOL) 325 MG tablet     amLODIPine (NORVASC) 10 MG tablet     Ascorbic Acid (VITAMIN C) 500 MG CHEW     atorvastatin (LIPITOR) 80 MG tablet     Cholecalciferol (VITAMIN D3) 50 MCG (2000 UT) CAPS     CRANBERRY EXTRACT PO     diclofenac (VOLTAREN) 1 % topical gel     gabapentin (NEURONTIN) 300 MG capsule     HYDROcodone-acetaminophen (NORCO) 5-325 MG tablet     hydrOXYzine (ATARAX) 10 MG tablet     ibuprofen (ADVIL/MOTRIN) 600 MG tablet     levothyroxine (SYNTHROID/LEVOTHROID) 112 MCG tablet     lisinopril (ZESTRIL) 40 MG tablet     melatonin 5 MG tablet     methocarbamol (ROBAXIN) 500 MG tablet     metoprolol succinate ER (TOPROL-XL) 50 MG 24 hr tablet     Multiple Vitamin (MULTI-VITAMIN) per tablet     ondansetron (ZOFRAN-ODT) 4 MG ODT tab     polyethylene glycol (MIRALAX) 17 g packet     senna-docusate (SENOKOT-S/PERICOLACE) 8.6-50 MG tablet     spironolactone (ALDACTONE) 25 MG tablet     terbinafine (LAMISIL AT) 1 % external cream     triamcinolone (KENALOG) 0.1 % external ointment     Vaginal Lubricant (REPHRESH) GEL     No current facility-administered medications for this visit.

## 2022-02-03 ENCOUNTER — MYC REFILL (OUTPATIENT)
Dept: ORTHOPEDICS | Facility: CLINIC | Age: 72
End: 2022-02-03
Payer: COMMERCIAL

## 2022-02-03 ENCOUNTER — MYC MEDICAL ADVICE (OUTPATIENT)
Dept: INTERNAL MEDICINE | Facility: CLINIC | Age: 72
End: 2022-02-03
Payer: COMMERCIAL

## 2022-02-03 DIAGNOSIS — M48.062 SPINAL STENOSIS OF LUMBAR REGION WITH NEUROGENIC CLAUDICATION: ICD-10-CM

## 2022-02-03 DIAGNOSIS — M51.369 DDD (DEGENERATIVE DISC DISEASE), LUMBAR: ICD-10-CM

## 2022-02-03 DIAGNOSIS — I10 ESSENTIAL HYPERTENSION, BENIGN: Primary | ICD-10-CM

## 2022-02-03 RX ORDER — METHOCARBAMOL 500 MG/1
500 TABLET, FILM COATED ORAL 3 TIMES DAILY
Qty: 40 TABLET | Refills: 0 | Status: SHIPPED | OUTPATIENT
Start: 2022-02-03 | End: 2022-04-29

## 2022-02-04 ENCOUNTER — PATIENT OUTREACH (OUTPATIENT)
Dept: SURGERY | Facility: CLINIC | Age: 72
End: 2022-02-04
Payer: COMMERCIAL

## 2022-02-04 RX ORDER — SPIRONOLACTONE 25 MG/1
50 TABLET ORAL DAILY
Qty: 90 TABLET | Refills: 3 | Status: SHIPPED | OUTPATIENT
Start: 2022-02-04 | End: 2022-07-29

## 2022-02-04 RX ORDER — HYDROCODONE BITARTRATE AND ACETAMINOPHEN 5; 325 MG/1; MG/1
1 TABLET ORAL EVERY 6 HOURS PRN
Qty: 40 TABLET | Refills: 0 | Status: SHIPPED | OUTPATIENT
Start: 2022-02-04 | End: 2022-02-15

## 2022-02-04 NOTE — PROGRESS NOTES
"  VS: BP (!) 150/78 (BP Location: Right arm)   Pulse 93   Temp (!) 100.4  F (37.4  C) (Oral)   Resp 16   Ht 1.702 m (5' 7\")   Wt 74 kg (163 lb 2.3 oz)   SpO2 98%   BMI 25.55 kg/m        Output: Up to bedside commode, voiding spontaneously - has urgency and frequency. LBM: 11/20- small, no relief in pain or nausea. Pt received PRN bisacodyl suppository and Fleet enema to little effect.  Bowel sounds normal. Pt passing gas as normal.   Lungs: Clear bilaterally, on RA. Denies chest pain or SOB.    Activity: SBA w/ walker to pivot to bedside commode. Steady. Able to reposition/turn in bed independently.    Skin: WDL ex L abd and spine incision   Pain:    Managed w/ PRN Oxy 5-10 mg, Robaxin, and scheduled Tylenol. Pt rating pain 4-5/10   Neuro/CMS:    A & Ox4. Baseline numbness in BLE. Forgetfulness and distraction with opioids.   Dressing(s):    L abdomen- clean and dry  Spinal dressing- clean, dry, intact.   Diet:    50% reduced. Pt reports nausea and constipation are suppressing her appetite.   LDA:    R PIV - TKO  RADHA   Equipment:    IV pole, commode, walker, personal belongings   Plan:    Manage pain,encourage activity, and continue giving suppositories and enemas.w. Continue w/ POC.    Additional Info:    Hearing aids in container on bedside table.   BP and lab draws on R side only d/t L sided mastectomy and lymphedema.         " Pt aware

## 2022-02-04 NOTE — PROGRESS NOTES
Patient Telephone Reminder Call    Date of call:  02/04/22  Phone numbers:  Home number on file [phone] (home)    Reached patient/confirmed appointment:  Yes  Appointment with:   Dr. Richard Encarnacion  Reason for visit: hernia consult

## 2022-02-07 ENCOUNTER — PRE VISIT (OUTPATIENT)
Dept: SURGERY | Facility: CLINIC | Age: 72
End: 2022-02-07

## 2022-02-07 ENCOUNTER — OFFICE VISIT (OUTPATIENT)
Dept: SURGERY | Facility: CLINIC | Age: 72
End: 2022-02-07
Attending: NURSE PRACTITIONER
Payer: COMMERCIAL

## 2022-02-07 VITALS
DIASTOLIC BLOOD PRESSURE: 92 MMHG | HEART RATE: 88 BPM | HEIGHT: 67 IN | OXYGEN SATURATION: 98 % | WEIGHT: 163.4 LBS | BODY MASS INDEX: 25.65 KG/M2 | SYSTOLIC BLOOD PRESSURE: 135 MMHG

## 2022-02-07 DIAGNOSIS — K43.2 INCISIONAL HERNIA, WITHOUT OBSTRUCTION OR GANGRENE: Primary | ICD-10-CM

## 2022-02-07 PROCEDURE — 99203 OFFICE O/P NEW LOW 30 MIN: CPT | Mod: 24 | Performed by: SURGERY

## 2022-02-07 ASSESSMENT — PAIN SCALES - GENERAL: PAINLEVEL: MODERATE PAIN (5)

## 2022-02-07 ASSESSMENT — MIFFLIN-ST. JEOR: SCORE: 1298.81

## 2022-02-07 NOTE — PROGRESS NOTES
Nadira Perez comes to clinic for possible abdominal wall hernia.  She noticed a mass in her right lower abdomen several months ago.  It has always been soft and nontender.  She denies any symptoms of incarceration or strangulation.  She had an umbilical hernia repaired when she was 18 months old through a transverse incision.  She also had a hysterectomy through a Pfannenstiel incision as well as an anterior lumbar spine exposure through a left flank incision.    She is healing from her spinal surgery as well as some rib fractures due to a fall.  She is also healing from carpal tunnel since surgery.    A/P: This is a likely recurrent ventral hernia through her previous umbilical hernia repair.  This would then therefore be an recurrent ventral incisional hernia.  Her CT scan of the chest does not go caudally enough to see the area.  We discussed repairs versus watchful waiting.  We discussed the risk of incarceration and strangulation and as long as the hernia is soft and reducible the chance of strangulation is nil.  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  She would like to wait while she completely heals from some of these other operations and injuries.  She will return and see me this summer.  Prior to that she we will get a CAT scan to map out her abdominal wall anatomy to plan a potential repair.    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     Thyroid disease        Past Surgical  History:   Procedure Laterality Date     ABDOMEN SURGERY      ovarian cyst, mesh     ARTHRODESIS WRIST Right      ARTHRODESIS WRIST  02/14/2013    Procedure: ARTHRODESIS WRIST;  left wrist scaphoid excision, four bone fusion, iliac crest bone graft  ( Mac with block);  Surgeon: Av Mendez MD;  [facility]     BIOPSY      skin, vaginal     CATARACT IOL, RT/LT Right 03/13/2018     CATARACT IOL, RT/LT Left 02/20/2018     COLONOSCOPY  12/24/2013    Procedure: COMBINED COLONOSCOPY, SINGLE BIOPSY/POLYPECTOMY BY BIOPSY;  COLONOSCOPY;  Surgeon: Dom Alvarez MD;  Location:  GI     ESOPHAGOSCOPY, GASTROSCOPY, DUODENOSCOPY (EGD), COMBINED N/A 11/23/2016    Procedure: COMBINED ESOPHAGOSCOPY, GASTROSCOPY, DUODENOSCOPY (EGD);  Surgeon: Quinten Feliciano MD;  Location:  GI     EXTERNAL EAR SURGERY      right     EYE SURGERY      radial keratomy     FUSION, SPINE, INTERBODY, OBLIQUE ANTERIOR AND LUMBAR, 2 LEVELS, POST APPROACH, USING OTS N/A 11/18/2021    Procedure: Part 1: Oblique Anterior Interbody Fusion at Lumbar 5 to sacral 1 with use of Bone Morphogenic Protein,;  Surgeon: Serge Ramirez MD;  Location: [facility]     GRAFT BONE FROM ILIAC CREST  02/14/2013    Procedure: GRAFT BONE FROM ILIAC CREST;  mac with block and local infilitration;  Surgeon: Av Mendez MD;  [facility]      BREATH HYDROGEN TEST N/A 10/14/2016    Procedure: HYDROGEN BREATH TEST;  Surgeon: Cheri Barron MD;  Location:  GI     HERNIA REPAIR      umbilical age 18 mos.     HYSTERECTOMY TOTAL ABDOMINAL  05/03/2000     MASTECTOMY MODIFIED RADICAL Bilateral     bilateral; right breast prophylactic     OPTICAL TRACKING SYSTEM FUSION POSTERIOR SPINE LUMBAR N/A 11/18/2021    Procedure: Open Posterior Instrumented Spinal Fusion at Lumbar 5 to Sacral 1, with grimm aguayo osteotomy, use of O-Arm/Stealth;  Surgeon: Serge Ramirez MD;  [facility]: [facility]     PARATHYROIDECTOMY  09/23/2004     R SUPERIOR     PARATHYROIDECTOMY  09/23/2004    parathyroid resection, subtotal     RELEASE CARPAL TUNNEL Right 12/2/2021    Procedure: RIGHT CARPAL TUNNEL RELEASE, RIGHT WRIST HARDWARE REMOVAL, RIGHT CARPAL BOSS EXCISION;  Surgeon: Rolan Conley MD;  Location: [facility] OR     REMOVE HARDWARE HAND  09/24/2013    Procedure: REMOVE HARDWARE HAND;  Left Hand Screw Removal        RHINOPLASTY  1968     thyr proc skin closed cosmetic manner by subcuticular stitch  01/23/2009     THYROPLASTY  10/09/2009     TONSILLECTOMY  1977     WRIST SURGERY      wrist arthrodesis          Allergies   Allergen Reactions     Erythromycin Nausea     Penicillins Hives     Around age 6 - doesn't recall full reaction, mother told her it was hives.     Has tolerated cephalsporins.          Current Outpatient Medications:      acetaminophen (TYLENOL) 325 MG tablet, Take 2 tablets (650 mg) by mouth every 4 hours as needed for other, Disp: 60 tablet, Rfl: 0     amLODIPine (NORVASC) 10 MG tablet, Take 1 tablet (10 mg) by mouth daily (Patient taking differently: Take 10 mg by mouth daily Takes around 10 AM.), Disp: 90 tablet, Rfl: 3     Ascorbic Acid (VITAMIN C) 500 MG CHEW, Take 1 tablet by mouth daily, Disp: , Rfl:      atorvastatin (LIPITOR) 80 MG tablet, Take 1 tablet (80 mg) by mouth daily (Patient taking differently: Take 80 mg by mouth every evening ), Disp: 90 tablet, Rfl: 3     Cholecalciferol (VITAMIN D3) 50 MCG (2000 UT) CAPS, Take 6,000 Units by mouth daily (Patient taking differently: Take 6,000 Units by mouth daily (with lunch) ), Disp: 270 capsule, Rfl: 3     CRANBERRY EXTRACT PO, Take 650 mg by mouth daily ~10 am  Takes 2, Disp: , Rfl:      diclofenac (VOLTAREN) 1 % topical gel, APPLY 4 GRAMS TO KNEES OR 2 GRAMS TO HANDS FOUR TIMES DAILY USING ENCLOSED DOSING CARD., Disp: 100 g, Rfl: 3     gabapentin (NEURONTIN) 300 MG capsule, Take 1-2 capsules by mouth every 8 hours (Patient taking differently: Take 900 mg by mouth 3 times daily ),  Disp: 540 capsule, Rfl: 3     HYDROcodone-acetaminophen (NORCO) 5-325 MG tablet, Take 1 tablet by mouth every 6 hours as needed for severe pain Uses about 10 tablets per month., Disp: 40 tablet, Rfl: 0     hydrOXYzine (ATARAX) 10 MG tablet, Take 1 tablet (10 mg) by mouth 2 times daily as needed for itching (pain adjuvant), Disp: 30 tablet, Rfl: 0     ibuprofen (ADVIL/MOTRIN) 600 MG tablet, Take 1 tablet (600 mg) by mouth every 6 hours as needed for other (mild and/or inflammatory pain), Disp: 30 tablet, Rfl: 0     levothyroxine (SYNTHROID/LEVOTHROID) 112 MCG tablet, TAKE 1/2 TABLET BY MOUTH DAILY, Disp: 45 tablet, Rfl: 3     lisinopril (ZESTRIL) 40 MG tablet, Take 1 tablet (40 mg) by mouth daily, Disp: 90 tablet, Rfl: 3     melatonin 5 MG tablet, Take 10 mg by mouth At Bedtime , Disp: , Rfl:      methocarbamol (ROBAXIN) 500 MG tablet, Take 1 tablet (500 mg) by mouth 3 times daily, Disp: 40 tablet, Rfl: 0     metoprolol succinate ER (TOPROL-XL) 50 MG 24 hr tablet, Take 1 tablet (50 mg) by mouth daily (Patient taking differently: Take 50 mg by mouth every morning ), Disp: 90 tablet, Rfl: 3     Multiple Vitamin (MULTI-VITAMIN) per tablet, Take 1 tablet by mouth daily (with lunch) , Disp: , Rfl:      ondansetron (ZOFRAN-ODT) 4 MG ODT tab, Take 1 tablet (4 mg) by mouth every 12 hours as needed for nausea, Disp: 180 tablet, Rfl: 3     polyethylene glycol (MIRALAX) 17 g packet, Take 17 g by mouth 2 times daily (Patient taking differently: Take 17 g by mouth 2 times daily as needed ), Disp: 7 packet, Rfl: 0     senna-docusate (SENOKOT-S/PERICOLACE) 8.6-50 MG tablet, Take 2 tablets by mouth 2 times daily, Disp: 30 tablet, Rfl: 0     spironolactone (ALDACTONE) 25 MG tablet, Take 2 tablets (50 mg) by mouth daily, Disp: 90 tablet, Rfl: 3     terbinafine (LAMISIL AT) 1 % external cream, Apply topically 2 times daily For fungal infection not resolved with other antifungals (e.g. Clotrimazole) (Patient taking differently: Apply  "topically 2 times daily as needed (toenail fungus) For fungal infection not resolved with other antifungals (e.g. Clotrimazole)), Disp: 24 g, Rfl: 11     triamcinolone (KENALOG) 0.1 % external ointment, Apply topically 2 times daily (Patient taking differently: Apply topically 2 times daily as needed ), Disp: 30 g, Rfl: 11     Vaginal Lubricant (REPHRESH) GEL, Place 2 g vaginally every 3 days, Disp: 2 g, Rfl: 3    family history includes Asthma in her maternal grandfather, maternal grandmother, and another family member; Breast Cancer in her cousin; Cancer in some other family members; Cerebrovascular Disease in her mother and other family members; Chronic Obstructive Pulmonary Disease in her maternal grandfather; Circulatory in her brother; Dementia in her mother and another family member; Diabetes in her maternal aunt, maternal grandmother, mother, and another family member; Glaucoma in her maternal aunt; Heart Disease in her father; Hypertension in her father and other family members; Melanoma in her maternal aunt; Neurologic Disorder in her mother; Obesity in an other family member; Osteoporosis in her mother; Respiratory in an other family member; Thyroid Disease in her mother.    Social History     Tobacco Use     Smoking status: Never Smoker     Smokeless tobacco: Never Used   Substance Use Topics     Alcohol use: Not Currently     Alcohol/week: 7.0 standard drinks     Types: 7 Glasses of wine per week     Comment: Rare to occasional     Drug use: Yes     Types: Marijuana       EXAM  BP (!) 135/92 (BP Location: Right arm, Patient Position: Sitting, Cuff Size: Adult Regular)   Pulse 88   Ht 1.702 m (5' 7\")   Wt 74.1 kg (163 lb 6.4 oz)   SpO2 98%   BMI 25.59 kg/m    abd soft, doughy, transverse mid abdomnal scar, left flank scar, Pfannenstiel scar. Mass to the right of midline at the transverse scar.  Able to reduce when supine.  Soft, nontender.  Small fascial defect palpable.     CT chest doesn't go low " enough to define

## 2022-02-07 NOTE — LETTER
2/7/2022       RE: Nadira Perez  [address]  [facility] [address]     Dear Colleague,    Thank you for referring your patient, Nadira Perez, to the [facility] [place] at [facility]. Please see a copy of my visit note below.    Nadira Perez comes to clinic for possible abdominal wall hernia.  She noticed a mass in her right lower abdomen several months ago.  It has always been soft and nontender.  She denies any symptoms of incarceration or strangulation.  She had an umbilical hernia repaired when she was 18 months old through a transverse incision.  She also had a hysterectomy through a Pfannenstiel incision as well as an anterior lumbar spine exposure through a left flank incision.    She is healing from her spinal surgery as well as some rib fractures due to a fall.  She is also healing from carpal tunnel since surgery.    A/P: This is a likely recurrent ventral hernia through her previous umbilical hernia repair.  This would then therefore be an recurrent ventral incisional hernia.  Her CT scan of the chest does not go caudally enough to see the area.  We discussed repairs versus watchful waiting.  We discussed the risk of incarceration and strangulation and as long as the hernia is soft and reducible the chance of strangulation is nil.  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  She would like to wait while she completely heals from some of these other operations and injuries.  She will return and see me this summer.  Prior to that she we will get a CAT scan to map out her abdominal wall anatomy to plan a potential repair.    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     Thyroid disease        Past Surgical History:   Procedure Laterality Date     ABDOMEN SURGERY      ovarian cyst, mesh     ARTHRODESIS WRIST Right      ARTHRODESIS WRIST  02/14/2013    Procedure: ARTHRODESIS WRIST;  left wrist scaphoid excision, four bone fusion, iliac crest bone graft  ( Mac with block);  Surgeon: Av Mendez MD;  [facility]     BIOPSY      skin, vaginal     CATARACT IOL, RT/LT Right 03/13/2018     CATARACT IOL, RT/LT Left 02/20/2018     COLONOSCOPY  12/24/2013    Procedure: COMBINED COLONOSCOPY, SINGLE BIOPSY/POLYPECTOMY BY BIOPSY;  COLONOSCOPY;  Surgeon: Dom Alvarez MD;  Location:  GI     ESOPHAGOSCOPY, GASTROSCOPY, DUODENOSCOPY (EGD), COMBINED N/A 11/23/2016    Procedure: COMBINED ESOPHAGOSCOPY, GASTROSCOPY, DUODENOSCOPY (EGD);  Surgeon: Quinten Feliciano MD;  Location:  GI     EXTERNAL EAR SURGERY      right     EYE SURGERY      radial keratomy     FUSION, SPINE, INTERBODY, OBLIQUE ANTERIOR AND LUMBAR, 2 LEVELS, POST APPROACH, USING OTS N/A 11/18/2021    Procedure: Part 1: Oblique Anterior Interbody Fusion at Lumbar 5 to sacral 1 with use of Bone Morphogenic Protein,;  Surgeon: Serge Ramirez MD;  [facility]: [facility]     GRAFT BONE FROM ILIAC CREST  02/14/2013    Procedure: GRAFT BONE FROM ILIAC CREST;  mac with block and local infilitration;  Surgeon: Av Mendez MD;  [facility]      BREATH HYDROGEN TEST N/A 10/14/2016    Procedure: HYDROGEN BREATH TEST;  Surgeon: Cheri Barron MD;  Location:  GI     HERNIA REPAIR      umbilical age 18 mos.     HYSTERECTOMY TOTAL ABDOMINAL  05/03/2000     MASTECTOMY MODIFIED RADICAL Bilateral      bilateral; right breast prophylactic     OPTICAL TRACKING SYSTEM FUSION POSTERIOR SPINE LUMBAR N/A 11/18/2021    Procedure: Open Posterior Instrumented Spinal Fusion at Lumbar 5 to Sacral 1, with grimm aguayo osteotomy, use of O-Arm/Stealth;  Surgeon: Serge Ramirez MD;  [facility]     PARATHYROIDECTOMY  09/23/2004    R SUPERIOR     PARATHYROIDECTOMY  09/23/2004    parathyroid resection, subtotal     RELEASE CARPAL TUNNEL Right 12/2/2021    Procedure: RIGHT CARPAL TUNNEL RELEASE, RIGHT WRIST HARDWARE REMOVAL, RIGHT CARPAL BOSS EXCISION;  Surgeon: Rloan Conley MD;  Location: [facility] OR     REMOVE HARDWARE HAND  09/24/2013    Procedure: REMOVE HARDWARE HAND;  Left Hand Screw Removal        RHINOPLASTY  1968     thyr proc skin closed cosmetic manner by subcuticular stitch  01/23/2009     THYROPLASTY  10/09/2009     TONSILLECTOMY  1977     WRIST SURGERY      wrist arthrodesis          Allergies   Allergen Reactions     Erythromycin Nausea     Penicillins Hives     Around age 6 - doesn't recall full reaction, mother told her it was hives.     Has tolerated cephalsporins.          Current Outpatient Medications:      acetaminophen (TYLENOL) 325 MG tablet, Take 2 tablets (650 mg) by mouth every 4 hours as needed for other, Disp: 60 tablet, Rfl: 0     amLODIPine (NORVASC) 10 MG tablet, Take 1 tablet (10 mg) by mouth daily (Patient taking differently: Take 10 mg by mouth daily Takes around 10 AM.), Disp: 90 tablet, Rfl: 3     Ascorbic Acid (VITAMIN C) 500 MG CHEW, Take 1 tablet by mouth daily, Disp: , Rfl:      atorvastatin (LIPITOR) 80 MG tablet, Take 1 tablet (80 mg) by mouth daily (Patient taking differently: Take 80 mg by mouth every evening ), Disp: 90 tablet, Rfl: 3     Cholecalciferol (VITAMIN D3) 50 MCG (2000 UT) CAPS, Take 6,000 Units by mouth daily (Patient taking differently: Take 6,000 Units by mouth daily (with lunch) ), Disp: 270 capsule, Rfl: 3     CRANBERRY EXTRACT PO, Take 650 mg by mouth  daily ~10 am  Takes 2, Disp: , Rfl:      diclofenac (VOLTAREN) 1 % topical gel, APPLY 4 GRAMS TO KNEES OR 2 GRAMS TO HANDS FOUR TIMES DAILY USING ENCLOSED DOSING CARD., Disp: 100 g, Rfl: 3     gabapentin (NEURONTIN) 300 MG capsule, Take 1-2 capsules by mouth every 8 hours (Patient taking differently: Take 900 mg by mouth 3 times daily ), Disp: 540 capsule, Rfl: 3     HYDROcodone-acetaminophen (NORCO) 5-325 MG tablet, Take 1 tablet by mouth every 6 hours as needed for severe pain Uses about 10 tablets per month., Disp: 40 tablet, Rfl: 0     hydrOXYzine (ATARAX) 10 MG tablet, Take 1 tablet (10 mg) by mouth 2 times daily as needed for itching (pain adjuvant), Disp: 30 tablet, Rfl: 0     ibuprofen (ADVIL/MOTRIN) 600 MG tablet, Take 1 tablet (600 mg) by mouth every 6 hours as needed for other (mild and/or inflammatory pain), Disp: 30 tablet, Rfl: 0     levothyroxine (SYNTHROID/LEVOTHROID) 112 MCG tablet, TAKE 1/2 TABLET BY MOUTH DAILY, Disp: 45 tablet, Rfl: 3     lisinopril (ZESTRIL) 40 MG tablet, Take 1 tablet (40 mg) by mouth daily, Disp: 90 tablet, Rfl: 3     melatonin 5 MG tablet, Take 10 mg by mouth At Bedtime , Disp: , Rfl:      methocarbamol (ROBAXIN) 500 MG tablet, Take 1 tablet (500 mg) by mouth 3 times daily, Disp: 40 tablet, Rfl: 0     metoprolol succinate ER (TOPROL-XL) 50 MG 24 hr tablet, Take 1 tablet (50 mg) by mouth daily (Patient taking differently: Take 50 mg by mouth every morning ), Disp: 90 tablet, Rfl: 3     Multiple Vitamin (MULTI-VITAMIN) per tablet, Take 1 tablet by mouth daily (with lunch) , Disp: , Rfl:      ondansetron (ZOFRAN-ODT) 4 MG ODT tab, Take 1 tablet (4 mg) by mouth every 12 hours as needed for nausea, Disp: 180 tablet, Rfl: 3     polyethylene glycol (MIRALAX) 17 g packet, Take 17 g by mouth 2 times daily (Patient taking differently: Take 17 g by mouth 2 times daily as needed ), Disp: 7 packet, Rfl: 0     senna-docusate (SENOKOT-S/PERICOLACE) 8.6-50 MG tablet, Take 2 tablets by mouth  2 times daily, Disp: 30 tablet, Rfl: 0     spironolactone (ALDACTONE) 25 MG tablet, Take 2 tablets (50 mg) by mouth daily, Disp: 90 tablet, Rfl: 3     terbinafine (LAMISIL AT) 1 % external cream, Apply topically 2 times daily For fungal infection not resolved with other antifungals (e.g. Clotrimazole) (Patient taking differently: Apply topically 2 times daily as needed (toenail fungus) For fungal infection not resolved with other antifungals (e.g. Clotrimazole)), Disp: 24 g, Rfl: 11     triamcinolone (KENALOG) 0.1 % external ointment, Apply topically 2 times daily (Patient taking differently: Apply topically 2 times daily as needed ), Disp: 30 g, Rfl: 11     Vaginal Lubricant (REPHRESH) GEL, Place 2 g vaginally every 3 days, Disp: 2 g, Rfl: 3    family history includes Asthma in her maternal grandfather, maternal grandmother, and another family member; Breast Cancer in her cousin; Cancer in some other family members; Cerebrovascular Disease in her mother and other family members; Chronic Obstructive Pulmonary Disease in her maternal grandfather; Circulatory in her brother; Dementia in her mother and another family member; Diabetes in her maternal aunt, maternal grandmother, mother, and another family member; Glaucoma in her maternal aunt; Heart Disease in her father; Hypertension in her father and other family members; Melanoma in her maternal aunt; Neurologic Disorder in her mother; Obesity in an other family member; Osteoporosis in her mother; Respiratory in an other family member; Thyroid Disease in her mother.    Social History     Tobacco Use     Smoking status: Never Smoker     Smokeless tobacco: Never Used   Substance Use Topics     Alcohol use: Not Currently     Alcohol/week: 7.0 standard drinks     Types: 7 Glasses of wine per week     Comment: Rare to occasional     Drug use: Yes     Types: Marijuana       EXAM  BP (!) 135/92 (BP Location: Right arm, Patient Position: Sitting, Cuff Size: Adult Regular)    "Pulse 88   Ht 1.702 m (5' 7\")   Wt 74.1 kg (163 lb 6.4 oz)   SpO2 98%   BMI 25.59 kg/m    abd soft, doughy, transverse mid abdomnal scar, left flank scar, Pfannenstiel scar. Mass to the right of midline at the transverse scar.  Able to reduce when supine.  Soft, nontender.  Small fascial defect palpable.     CT chest doesn't go low enough to define        Again, thank you for allowing me to participate in the care of your patient.      Sincerely,    Dhiraj Encarnacion MD      "

## 2022-02-07 NOTE — NURSING NOTE
"Chief Complaint   Patient presents with     New Patient     Ventral hernia        Vitals:    02/07/22 0759   BP: (!) 135/92   BP Location: Right arm   Patient Position: Sitting   Cuff Size: Adult Regular   Pulse: 88   SpO2: 98%   Weight: 74.1 kg (163 lb 6.4 oz)   Height: 1.702 m (5' 7\")       Body mass index is 25.59 kg/m .                          Chelsi Fong, EMT    "

## 2022-02-08 ENCOUNTER — THERAPY VISIT (OUTPATIENT)
Dept: OCCUPATIONAL THERAPY | Facility: CLINIC | Age: 72
End: 2022-02-08
Payer: OTHER MISCELLANEOUS

## 2022-02-08 DIAGNOSIS — M79.641 PAIN IN BOTH HANDS: ICD-10-CM

## 2022-02-08 DIAGNOSIS — G56.01 RIGHT CARPAL TUNNEL SYNDROME: ICD-10-CM

## 2022-02-08 DIAGNOSIS — Z98.890 S/P WRIST SURGERY: ICD-10-CM

## 2022-02-08 DIAGNOSIS — M79.642 PAIN IN BOTH HANDS: ICD-10-CM

## 2022-02-08 PROCEDURE — 97112 NEUROMUSCULAR REEDUCATION: CPT | Mod: GO | Performed by: OCCUPATIONAL THERAPIST

## 2022-02-08 PROCEDURE — 97140 MANUAL THERAPY 1/> REGIONS: CPT | Mod: GO | Performed by: OCCUPATIONAL THERAPIST

## 2022-02-15 ENCOUNTER — THERAPY VISIT (OUTPATIENT)
Dept: OCCUPATIONAL THERAPY | Facility: CLINIC | Age: 72
End: 2022-02-15
Payer: OTHER MISCELLANEOUS

## 2022-02-15 DIAGNOSIS — G56.01 RIGHT CARPAL TUNNEL SYNDROME: ICD-10-CM

## 2022-02-15 DIAGNOSIS — M79.642 PAIN IN BOTH HANDS: ICD-10-CM

## 2022-02-15 DIAGNOSIS — Z98.890 S/P WRIST SURGERY: ICD-10-CM

## 2022-02-15 DIAGNOSIS — M79.641 PAIN IN BOTH HANDS: ICD-10-CM

## 2022-02-15 PROCEDURE — 97110 THERAPEUTIC EXERCISES: CPT | Mod: GO | Performed by: OCCUPATIONAL THERAPIST

## 2022-02-15 PROCEDURE — 97140 MANUAL THERAPY 1/> REGIONS: CPT | Mod: GO | Performed by: OCCUPATIONAL THERAPIST

## 2022-02-15 RX ORDER — HYDROCODONE BITARTRATE AND ACETAMINOPHEN 5; 325 MG/1; MG/1
1 TABLET ORAL EVERY 6 HOURS PRN
Qty: 30 TABLET | Refills: 0
Start: 2022-02-15 | End: 2022-04-29

## 2022-02-15 NOTE — PROGRESS NOTES
[facility] Progress/Discharge Note  Current Date: 2/15/2022  Reporting Period: 1/11/2022 to 2/15/2022    Diagnosis: R CTS, Dorsal Wrist pain   DOI: 5/28/2004 (MD order date 12/17/21)  DOS: 12/2/21  Procedure:R CTR, Removal of hardware from failed 4 corner fuxson with loose screw in the radiocarpal joint    S:  Subjective changes as noted by patient: Overall, I am able to do more.  It isn't as sore in the palm.     Functional changes noted by patient: using hands more     Response to previous treatment:  good  Patient has noted adverse reaction to:   None      Objective:  Pain Level (Scale 0-10)   1/11/2022 2/15/22   At Rest 0 0   With Use 6 6     Pain Description  Date 1/11/2022 2/15/22   Location R volar wrist >dorsal wrist R volar wrist   Pain Quality Aching, Burning and Sharp Aching, sharp at times, burning   Frequency intermittent   intermittent   Pain is worst  daytime or nighttime Daytime or nighttime   Exacerbated by  use--picking up ceramic bird dish use   Relieved by rest rest   Progression Gradually improving Gradually improving     Edema (Circumference measured in cm)   1/11/2022 1/11/2022 2/15/22 2/15/22    L R L R   DWC 19.3 18.3 18.4 16.5      Scar   Sensitivity: none Quality:  Mild tenderness to touch, mild adherence present in dorsal scar, mild to moderate adherence present in volar scar 2/15/22: mild adherence present in both the volar and dorsal scars    Sensation   WNL throughout all nerve distributions; per patient report    ROM  Thumb and Fingers: WNL per visual observation compared to the L    Pain Report: - none  + mild    ++ moderate    +++ severe   Wrist 1/11/2022 1/11/2022 2/15/22 2/15/22   AROM (PROM) L R L R   Extension 43 35 57 41   Flexion 22 35 27 42   RD 10 12 12 15   UD 18 33 18 15     Strength   (Measured in pounds)  Pain Report:  scale of 0-10/10   2/15/2022 2/15/2022   Trials L R   1  2  3 34 20   Average 34 20     Lat Pinch 2/15/2022 2/15/2022   Trials L R   1  2  3 12  10   Average 12 10     3 Pt Pinch 2/15/2022 2/15/2022   Trials L R   1  2  3 10 5   Average 10 5     A: Patient's symptoms are resolving.  Patient is progressing well.  Patient is independent in home exercise program.  Patient has met Short and Long Term Treatment Goals.  Patient is ready to be discharged from therapy and continue their home treatment program.    P:  Discharge from Hand Therapy; continue home program.  Patient to contact MD for tx orders should further issues arise.    Discharge Plan:    Achieve all LTG.  Independent in home treatment program.  Reach maximal therapeutic benefit.    Home Exercise Program:  Wrist Stabilization Alternate Pull Backs  EMR Notes  HEP - Sets  Reps  Sessions per day  Notes Work up to 50 reps over the course of the day, hold for 5 sec  Nerve Gliding Proximal Median  EMR Notes  HEP - Sets 1  Reps 10  Sessions per day 2  Notes Hold 5 sec  Hand Strengthening Gripping  EMR Notes  HEP - Sets 1  Reps until mild fatigue  Sessions per day 2  Notes  Intrinsic Strengthening Hook Fist  EMR Notes  HEP - Sets 1  Reps until mild fatigue  Sessions per day  Notes 2x/day  Thumb Strengthening Palmar Abduction  EMR Notes  HEP - Sets 1  Reps until mild fatigue  Sessions per day  Notes 2x/day    Next Visit:  A/AA/PROM  Scar mobilization  Wrist stability exercises

## 2022-02-16 PROBLEM — Z98.890 S/P WRIST SURGERY: Status: RESOLVED | Noted: 2022-01-13 | Resolved: 2022-02-16

## 2022-02-16 PROBLEM — M79.641 PAIN IN BOTH HANDS: Status: RESOLVED | Noted: 2022-01-13 | Resolved: 2022-02-16

## 2022-02-16 PROBLEM — M79.642 PAIN IN BOTH HANDS: Status: RESOLVED | Noted: 2022-01-13 | Resolved: 2022-02-16

## 2022-02-16 PROBLEM — G56.01 RIGHT CARPAL TUNNEL SYNDROME: Status: RESOLVED | Noted: 2021-10-21 | Resolved: 2022-02-16

## 2022-02-21 ENCOUNTER — HOSPITAL ENCOUNTER (OUTPATIENT)
Dept: PHYSICAL THERAPY | Facility: CLINIC | Age: 72
End: 2022-02-21
Payer: COMMERCIAL

## 2022-02-21 DIAGNOSIS — I89.0 LYMPHEDEMA OF LEFT UPPER EXTREMITY: Primary | ICD-10-CM

## 2022-02-21 DIAGNOSIS — C50.919 MALIGNANT NEOPLASM OF FEMALE BREAST, UNSPECIFIED ESTROGEN RECEPTOR STATUS, UNSPECIFIED LATERALITY, UNSPECIFIED SITE OF BREAST (H): ICD-10-CM

## 2022-02-21 DIAGNOSIS — L90.5 SCAR CONDITION AND FIBROSIS OF SKIN: ICD-10-CM

## 2022-02-21 PROCEDURE — 97140 MANUAL THERAPY 1/> REGIONS: CPT | Mod: GP | Performed by: PHYSICAL THERAPIST

## 2022-02-21 PROCEDURE — 97535 SELF CARE MNGMENT TRAINING: CPT | Mod: GP | Performed by: PHYSICAL THERAPIST

## 2022-02-21 NOTE — PROGRESS NOTES
[facility]    OUTPATIENT PHYSICAL THERAPY  PLAN OF TREATMENT FOR OUTPATIENT REHABILITATION AND PROGRESS NOTE           Patient's Last Name, First Name, Nadira Meyers Date of Birth  1950   Provider's Name  [facility] Medical Record No.  [id number]    Onset Date  10/07/2021 (date of order) Start of Care Date  11/11/2021   Type:     _X_PT   ___OT   ___SLP Medical Diagnosis  Lymphedema, malignant neoplasm of breast   PT Diagnosis  Lymphedema, fibrosis Plan of Treatment  Frequency/Duration: 1x/week x 4 weeks then decrease to 1x/month x 2 months  Certification date from 2/10/2022 to 5/7/2022     Goals:  Goal Identifier Home program   Goal Description Pt will be independent in upgraded HEP for chronic L UE/UQ lymphedema   Target Date 05/08/22 (goal date extended)   Date Met   no met   Progress (detail required for progress note):  progressing     Goal Identifier volume L UE   Goal Description Decrease volume L UE by 5% to demonstrate decreased risk of infection   Target Date 05/08/22   Date Met   not met   Progress (detail required for progress note):  progressing     Goal Identifier LLIS   Goal Description Decrease score of LLIS by 3 points to demonstrate decrease impact of lyphedmea on function   Target Date 05/08/22   Date Met   not met   Progress (detail required for progress note):  progressing     Delay in re- certification as pt cancelled and rescheduled session due to multiple concerns including fall with additional small fractures surrounding recent fusion and recent rib fractures and completion of therapy to address s/p carpal tunnel surgery.  Patient has received 3 sessions total and would benefit from additional therapy to achieve above goals with focus on upgrading current home lymphedema therapy program.         I CERTIFY THE NEED FOR THESE  SERVICES FURNISHED UNDER        THIS PLAN OF TREATMENT AND WHILE UNDER MY CARE     (Physician co-signature of this document indicates review and certification of the therapy plan).                Referring Provider: Khushbu Louise MD Lisa Karrow, PT

## 2022-02-21 NOTE — ADDENDUM NOTE
Encounter addended by: Jennifer Jauregui, PT on: 2/21/2022 5:18 PM   Actions taken: Clinical Note Signed, Document created, Document edited, Flowsheet accepted

## 2022-02-23 ENCOUNTER — PRE VISIT (OUTPATIENT)
Dept: ORTHOPEDICS | Facility: CLINIC | Age: 72
End: 2022-02-23

## 2022-03-01 ENCOUNTER — HOSPITAL ENCOUNTER (OUTPATIENT)
Dept: PHYSICAL THERAPY | Facility: CLINIC | Age: 72
End: 2022-03-01
Payer: COMMERCIAL

## 2022-03-01 DIAGNOSIS — R26.89 IMBALANCE: ICD-10-CM

## 2022-03-01 DIAGNOSIS — I89.0 LYMPHEDEMA OF LEFT UPPER EXTREMITY: Primary | ICD-10-CM

## 2022-03-01 DIAGNOSIS — M47.817 SPONDYLOSIS WITHOUT MYELOPATHY OR RADICULOPATHY, LUMBOSACRAL REGION: ICD-10-CM

## 2022-03-01 DIAGNOSIS — C50.919 MALIGNANT NEOPLASM OF FEMALE BREAST, UNSPECIFIED ESTROGEN RECEPTOR STATUS, UNSPECIFIED LATERALITY, UNSPECIFIED SITE OF BREAST (H): ICD-10-CM

## 2022-03-01 DIAGNOSIS — L90.5 SCAR CONDITION AND FIBROSIS OF SKIN: ICD-10-CM

## 2022-03-01 PROCEDURE — 97535 SELF CARE MNGMENT TRAINING: CPT | Mod: GP | Performed by: PHYSICAL THERAPIST

## 2022-03-01 PROCEDURE — 97140 MANUAL THERAPY 1/> REGIONS: CPT | Mod: GP | Performed by: PHYSICAL THERAPIST

## 2022-03-07 ENCOUNTER — HOSPITAL ENCOUNTER (OUTPATIENT)
Dept: PHYSICAL THERAPY | Facility: CLINIC | Age: 72
End: 2022-03-07
Payer: COMMERCIAL

## 2022-03-07 DIAGNOSIS — I89.0 LYMPHEDEMA OF LEFT UPPER EXTREMITY: Primary | ICD-10-CM

## 2022-03-07 DIAGNOSIS — C50.919 MALIGNANT NEOPLASM OF FEMALE BREAST, UNSPECIFIED ESTROGEN RECEPTOR STATUS, UNSPECIFIED LATERALITY, UNSPECIFIED SITE OF BREAST (H): ICD-10-CM

## 2022-03-07 DIAGNOSIS — R26.89 IMBALANCE: ICD-10-CM

## 2022-03-07 DIAGNOSIS — L90.5 SCAR CONDITION AND FIBROSIS OF SKIN: ICD-10-CM

## 2022-03-07 PROCEDURE — 97535 SELF CARE MNGMENT TRAINING: CPT | Mod: GP | Performed by: PHYSICAL THERAPIST

## 2022-03-07 PROCEDURE — 97140 MANUAL THERAPY 1/> REGIONS: CPT | Mod: GP | Performed by: PHYSICAL THERAPIST

## 2022-03-07 ASSESSMENT — ENCOUNTER SYMPTOMS
ABDOMINAL PAIN: 0
PARALYSIS: 0
FATIGUE: 0
HALLUCINATIONS: 0
JOINT SWELLING: 1
SYNCOPE: 0
DECREASED LIBIDO: 1
MUSCLE CRAMPS: 1
MEMORY LOSS: 0
NECK MASS: 0
DIARRHEA: 0
ORTHOPNEA: 0
EYE WATERING: 0
INSOMNIA: 1
SLEEP DISTURBANCES DUE TO BREATHING: 0
HOT FLASHES: 0
PALPITATIONS: 1
PANIC: 0
SPEECH CHANGE: 0
BACK PAIN: 1
VOMITING: 0
POLYPHAGIA: 0
SINUS PAIN: 0
DECREASED APPETITE: 0
NIGHT SWEATS: 0
DECREASED CONCENTRATION: 0
INCREASED ENERGY: 0
HEMATURIA: 0
TROUBLE SWALLOWING: 1
FEVER: 0
POLYDIPSIA: 0
RECTAL PAIN: 0
TREMORS: 0
WEAKNESS: 1
SEIZURES: 0
CHILLS: 0
SINUS CONGESTION: 0
EXERCISE INTOLERANCE: 1
BLOATING: 0
ARTHRALGIAS: 1
LIGHT-HEADEDNESS: 1
HEADACHES: 0
SMELL DISTURBANCE: 0
LOSS OF CONSCIOUSNESS: 0
MUSCLE WEAKNESS: 1
WEIGHT LOSS: 0
EYE REDNESS: 0
NERVOUS/ANXIOUS: 0
FLANK PAIN: 0
STIFFNESS: 1
LEG PAIN: 1
EYE IRRITATION: 0
HYPERTENSION: 1
NUMBNESS: 1
BOWEL INCONTINENCE: 0
HYPOTENSION: 0
DIFFICULTY URINATING: 1
NECK PAIN: 1
MYALGIAS: 1
CONSTIPATION: 0
SORE THROAT: 0
DIZZINESS: 1
TINGLING: 1
EYE PAIN: 0
DYSURIA: 0
HOARSE VOICE: 0
TASTE DISTURBANCE: 0
BLOOD IN STOOL: 0
DEPRESSION: 0
NAUSEA: 0
JAUNDICE: 0
DOUBLE VISION: 0
HEARTBURN: 0
DISTURBANCES IN COORDINATION: 1
ALTERED TEMPERATURE REGULATION: 0
WEIGHT GAIN: 0

## 2022-03-07 NOTE — ADDENDUM NOTE
Encounter addended by: Jennifer Jauregui, PT on: 3/7/2022 2:03 PM   Actions taken: Flowsheet accepted

## 2022-03-08 ENCOUNTER — OFFICE VISIT (OUTPATIENT)
Dept: ORTHOPEDICS | Facility: CLINIC | Age: 72
End: 2022-03-08
Attending: STUDENT IN AN ORGANIZED HEALTH CARE EDUCATION/TRAINING PROGRAM
Payer: COMMERCIAL

## 2022-03-08 DIAGNOSIS — M19.072 PRIMARY OSTEOARTHRITIS OF LEFT ANKLE: ICD-10-CM

## 2022-03-08 DIAGNOSIS — M89.9 OSTEOCHONDRAL LESION OF TALAR DOME: ICD-10-CM

## 2022-03-08 DIAGNOSIS — M94.9 OSTEOCHONDRAL LESION OF TALAR DOME: ICD-10-CM

## 2022-03-08 PROCEDURE — 99213 OFFICE O/P EST LOW 20 MIN: CPT | Performed by: ORTHOPAEDIC SURGERY

## 2022-03-08 NOTE — LETTER
3/8/2022         RE: Nadira Perez  [address]  [facility] [address]        Dear Colleague,    Thank you for referring your patient, Nadira Perez, to the [facility] [place]. Please see a copy of my visit note below.    CHIEF COMPLAINT:  Left ankle arthritis.    HISTORY OF PRESENT ILLNESS:  Mrs. Perez is a 71-year-old female who presents today for evaluation of her left ankle.  The patient presents today at the request of Dr. Otto Morgan who has had an evaluation of the left ankle and he was concerned about the presence of irregularity of the dome of the talus.    The patient reports to have had quite a strong back history where she underwent a lumbar spine fusion, which got complicated by a subsequent fall.  At that point she also had spine fractures and everything has been slightly slower and longer than she anticipated.  During one of these falls the patient hit her left ankle and after evaluation, she was diagnosed with an osteochondral defect.    The patient was very clear about the fact that she does not have any limitations in the left ankle.    Presents today for discussion of treatment options.    PAST MEDICAL HISTORY:  Which apparently is quite extensive.     PAST SURGICAL HISTORY:  Reviewed today.    DRUG ALLERGIES:  ERYTHROMYCIN AND PENICILLIN.    CURRENT MEDICATIONS:       1. Please refer to encounter form.    PHYSICAL EXAMINATION:  On today's visit, she presents as a pleasant female in no apparent distress with a height of 5 feet 7 inches and a weight of 163 pounds.  Denies to have any constitutional symptoms.    On today's visit, she presents with full range of motion of the left ankle, hindfoot and midfoot joints.  CMS intact.  Skin is intact.  Presents with some minor discomfort with palpation of the lateral malleolus.  There are no signs of CRPS or peripheral neuropathy.    IMAGING:  Three views of the left ankle were reviewed today from a  previous visit, which are significant for showing osteoarthritis across the ankle joint with a defect across the dome of the talus, which is all chronic in appearance.    ASSESSMENT:  Left ankle arthritis.    PLAN:  Discussed with the patient the natural history of her condition as well as the likelihood of developing some symptoms down the road.  For the time being she is going to proceed with activities as tolerated and she was given a prescription for physical therapy to work on exercises for the ankle joint.    The patient will contact us if she wishes to proceed with an injection of the left ankle, which will be done under fluoroscopic control with lidocaine and Kenalog for a diagnosis of osteoarthritis.    All questions were answered.    TT:  30 minutes.  CT:  20 minutes.        Delfino Faust MD

## 2022-03-08 NOTE — PROGRESS NOTES
CHIEF COMPLAINT:  Left ankle arthritis.    HISTORY OF PRESENT ILLNESS:  Mrs. Perez is a 71-year-old female who presents today for evaluation of her left ankle.  The patient presents today at the request of Dr. Otto Morgan who has had an evaluation of the left ankle and he was concerned about the presence of irregularity of the dome of the talus.    The patient reports to have had quite a strong back history where she underwent a lumbar spine fusion, which got complicated by a subsequent fall.  At that point she also had spine fractures and everything has been slightly slower and longer than she anticipated.  During one of these falls the patient hit her left ankle and after evaluation, she was diagnosed with an osteochondral defect.    The patient was very clear about the fact that she does not have any limitations in the left ankle.    Presents today for discussion of treatment options.    PAST MEDICAL HISTORY:  Which apparently is quite extensive.     PAST SURGICAL HISTORY:  Reviewed today.    DRUG ALLERGIES:  ERYTHROMYCIN AND PENICILLIN.    CURRENT MEDICATIONS:       1. Please refer to encounter form.    PHYSICAL EXAMINATION:  On today's visit, she presents as a pleasant female in no apparent distress with a height of 5 feet 7 inches and a weight of 163 pounds.  Denies to have any constitutional symptoms.    On today's visit, she presents with full range of motion of the left ankle, hindfoot and midfoot joints.  CMS intact.  Skin is intact.  Presents with some minor discomfort with palpation of the lateral malleolus.  There are no signs of CRPS or peripheral neuropathy.    IMAGING:  Three views of the left ankle were reviewed today from a previous visit, which are significant for showing osteoarthritis across the ankle joint with a defect across the dome of the talus, which is all chronic in appearance.    ASSESSMENT:  Left ankle arthritis.    PLAN:  Discussed with the patient the natural history of her condition  as well as the likelihood of developing some symptoms down the road.  For the time being she is going to proceed with activities as tolerated and she was given a prescription for physical therapy to work on exercises for the ankle joint.    The patient will contact us if she wishes to proceed with an injection of the left ankle, which will be done under fluoroscopic control with lidocaine and Kenalog for a diagnosis of osteoarthritis.    All questions were answered.    TT:  30 minutes.  CT:  20 minutes.

## 2022-03-08 NOTE — NURSING NOTE
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 Lateral left ankle pain. Dr. Beau Morgan refer for OCD lesion.       There were no vitals taken for this visit.    Pain Assessment  Patient Currently in Pain: Yes  0-10 Pain Scale: 1    Jami Suarez, ATC

## 2022-03-11 ENCOUNTER — OFFICE VISIT (OUTPATIENT)
Dept: AUDIOLOGY | Facility: CLINIC | Age: 72
End: 2022-03-11

## 2022-03-11 DIAGNOSIS — H90.3 ASYMMETRIC SNHL (SENSORINEURAL HEARING LOSS): Primary | ICD-10-CM

## 2022-03-14 NOTE — TELEPHONE ENCOUNTER
FUTURE VISIT INFORMATION      FUTURE VISIT INFORMATION:    Date: 4/11/22    Time: 12:15 PM    Location: [facility]  REFERRAL INFORMATION:    Referring provider: Self-Referred    Referring providers clinic: NA    Reason for visit/diagnosis: Droopy Eyelid    RECORDS REQUESTED FROM:       Clinic name Comments Records Status Imaging Status   [facility] 1/19/22, 10/27/21 - EYE OV with Dr. Domo Pizano  12/7/20 - EYE OV with Dr. Alcocer  6/3/20 - EYE OV with Dr. Ruby  12/14/17 - EYE OV with Dr. Madison [facility] - Imaging 7/17/20 - MRI [facility] PACs

## 2022-03-14 NOTE — PROGRESS NOTES
Medication Therapy Management (MTM) Encounter    ASSESSMENT:                            Medication Adherence/Access: No issues identified    Hypertension: Would benefit from switching amlodipine and lisinopril to BID dosing (half the full dose twice daily) to help achieve BP goals. Follow-up BMP also required.     Vertigo: Plan in place.     Hyperlipidemia: Due for follow-up lipid panel.     Takes Dietary Supplements: Would benefit from dose increase in fish oil and follow-up vitamin D lab.     PLAN:                            1. Change amlodipine to 5 mg twice daily and lisinopril to 20 mg twice daily.   2. Follow-up BMP, lipids and vitamin D.   3. Increase fish oil to 2 g daily.     Follow-up: Return for I will be in touch with labs return!.    SUBJECTIVE/OBJECTIVE:                          Nadira Perez is a 70 year old female called for a follow-up visit. She was referred to me from Matilde Quiñonez.  Today's visit is a follow-up MTM visit from 1/15/21.     Reason for visit: blood pressure follow-up.    Allergies/ADRs: Reviewed in chart  Tobacco: She reports that she has never smoked. She has never used smokeless tobacco.  Alcohol: Less than 1 beverage / month  Caffeine: 1-2 cups/day of coffee  Activity: PT, vestibular balance therapy  Past Medical History: Reviewed in chart    Medication Adherence/Access: no issues reported    Hypertension: Current medications include amlodipine 10 mg daily (taking at night), lisinopril 40 mg daily (taking in AM), metoprolol XL 50 mg daily (taking in AM), triamterene-hydrochlorothiazide 37.5-25 mg daily (taking in AM), potassium Cl ER 20 mEq three times weekly.  Patient does self-monitor blood pressure.  Patient reports no current medication side effects.    Home BP:     145/89 mmHg  132/99 mmHg  136/94 mmHg    Average 135-145/89-94 mmHg in April    BP Readings from Last 3 Encounters:   11/09/20 (!) 152/90   10/22/20 126/83   09/11/20 128/68     Last Comprehensive Metabolic  Panel:  Sodium   Date Value Ref Range Status   05/12/2020 139 133 - 144 mmol/L Final     Potassium   Date Value Ref Range Status   05/12/2020 3.7 3.4 - 5.3 mmol/L Final     Comment:     Specimen slightly hemolyzed, potassium may be falsely elevated     Chloride   Date Value Ref Range Status   05/12/2020 106 94 - 109 mmol/L Final     Carbon Dioxide   Date Value Ref Range Status   05/12/2020 28 20 - 32 mmol/L Final     Anion Gap   Date Value Ref Range Status   05/12/2020 5 3 - 14 mmol/L Final     Glucose   Date Value Ref Range Status   05/12/2020 112 (H) 70 - 99 mg/dL Final     Urea Nitrogen   Date Value Ref Range Status   05/12/2020 16 7 - 30 mg/dL Final     Creatinine   Date Value Ref Range Status   11/09/2020 0.72 0.52 - 1.04 mg/dL Final     GFR Estimate   Date Value Ref Range Status   11/09/2020 86 >60 mL/min/[1.73_m2] Final     Comment:     Non  GFR Calc  Starting 12/18/2018, serum creatinine based estimated GFR (eGFR) will be   calculated using the Chronic Kidney Disease Epidemiology Collaboration   (CKD-EPI) equation.       Calcium   Date Value Ref Range Status   11/09/2020 9.2 8.5 - 10.1 mg/dL Final     Vertigo: Current medications ondansetron 4 mg as needed (not using), metoclopramide 10 mg four times daily if needed (not using). Will start going back to dizzy and balance therapy and will use the nausea meds as needed.     Hyperlipidemia: Current therapy includes atorvastatin 80 mg daily.  Patient reports no significant myalgias or other side effects. Wonders when she should update her lab.   The 10-year ASCVD risk score (Ranidinorah TOMAS Jr., et al., 2013) is: 14.7%    Values used to calculate the score:      Age: 70 years      Sex: Female      Is Non- : No      Diabetic: No      Tobacco smoker: No      Systolic Blood Pressure: 152 mmHg      Is BP treated: Yes      HDL Cholesterol: 64 mg/dL      Total Cholesterol: 246 mg/dL  Recent Labs   Lab Test 12/16/20  1105 11/11/19  1510  "10/30/15  1221 10/30/15  1221 11/06/14  1141   CHOL 246* 210*   < > 182 228*   HDL 64 66   < > 48* 63   * 106*   < > 102 125   TRIG 338* 190*   < > 159* 198*   CHOLHDLRATIO  --   --   --  3.8 3.6    < > = values in this interval not displayed.     Takes Dietary Supplements:  Current medications include MVI daily, fish oil 1 g daily, magnesium 250 mg daily, vitamin D 2000 units daily, vitamin C 500 mg daily. She would like to monitor her vitamin D level and wonders if her fish oil dose is appropriate. She denies side effects.     Lab Results   Component Value Date    VITDT 56 05/12/2020    VITDT 62 11/11/2019    VITDT 68 11/13/2018    VITDT 60 04/17/2017    VITDT 49 10/30/2015       Today's Vitals: There were no vitals taken for this visit. - telemed    BP Readings from Last 1 Encounters:   11/09/20 (!) 152/90     Pulse Readings from Last 1 Encounters:   11/09/20 96     Wt Readings from Last 1 Encounters:   10/22/20 172 lb 6.4 oz (78.2 kg)     Ht Readings from Last 1 Encounters:   09/11/20 5' 7\" (1.702 m)     Estimated body mass index is 27 kg/m  as calculated from the following:    Height as of 9/11/20: 5' 7\" (1.702 m).    Weight as of 10/22/20: 172 lb 6.4 oz (78.2 kg).    Temp Readings from Last 1 Encounters:   11/09/20 98.2  F (36.8  C) (Oral)     ----------------    I spent 30 minutes with this patient today. All changes were made via collaborative practice agreement with Matilde Quiñonez. A copy of the visit note was provided to the patient's primary care provider.    The patient was sent via [employer] a summary of these recommendations.     Kisha Heller, Pharm.D., BCACP  Medication Therapy Management Pharmacist  Page/VM:  [phone]    Telemedicine Visit Details  Type of service:  Telephone visit  Start Time: 3:00 PM  End Time: 3:30 PM  Originating Location (patient location): Home  Distant Location (provider location):  [facility]        Medication Therapy " Recommendations  Benign essential hypertension    Current Medication: amLODIPine (NORVASC) 10 MG tablet (Discontinued)   Rationale: Frequency inappropriate - Dosage too low - Effectiveness   Recommendation: Increase Frequency - Take amlodipine 5 mg twice daily and lisinopril 20 mg twice daily   Status: Accepted per CPA         Hyperlipidemia    Current Medication: atorvastatin (LIPITOR) 80 MG tablet   Rationale: Medication requires monitoring - Needs additional monitoring - Effectiveness   Recommendation: Order Lab - Update lipids any time   Status: Accepted per CPA          Current Medication: Omega-3 Fatty Acids (OMEGA-3 FISH OIL PO)   Rationale: Dose too low - Dosage too low - Effectiveness   Recommendation: Increase Dose - Increase fish oil to 2 g daily   Status: Accepted - no CPA Needed                2 seconds or less

## 2022-03-17 ENCOUNTER — HOSPITAL ENCOUNTER (OUTPATIENT)
Dept: PHYSICAL THERAPY | Facility: CLINIC | Age: 72
Discharge: HOME | End: 2022-03-17
Payer: COMMERCIAL

## 2022-03-17 DIAGNOSIS — L90.5 SCAR CONDITION AND FIBROSIS OF SKIN: ICD-10-CM

## 2022-03-17 DIAGNOSIS — C50.919 MALIGNANT NEOPLASM OF FEMALE BREAST, UNSPECIFIED ESTROGEN RECEPTOR STATUS, UNSPECIFIED LATERALITY, UNSPECIFIED SITE OF BREAST (H): ICD-10-CM

## 2022-03-17 DIAGNOSIS — I89.0 LYMPHEDEMA OF LEFT UPPER EXTREMITY: Primary | ICD-10-CM

## 2022-03-17 PROCEDURE — 97535 SELF CARE MNGMENT TRAINING: CPT | Mod: GP | Performed by: PHYSICAL THERAPIST

## 2022-03-17 PROCEDURE — 97140 MANUAL THERAPY 1/> REGIONS: CPT | Mod: GP | Performed by: PHYSICAL THERAPIST

## 2022-03-25 ENCOUNTER — THERAPY VISIT (OUTPATIENT)
Dept: PHYSICAL THERAPY | Facility: CLINIC | Age: 72
End: 2022-03-25
Attending: ORTHOPAEDIC SURGERY
Payer: COMMERCIAL

## 2022-03-25 DIAGNOSIS — M89.9 OSTEOCHONDRAL LESION OF TALAR DOME: ICD-10-CM

## 2022-03-25 DIAGNOSIS — M19.072 PRIMARY OSTEOARTHRITIS OF LEFT ANKLE: ICD-10-CM

## 2022-03-25 DIAGNOSIS — M25.572 PAIN IN JOINT INVOLVING ANKLE AND FOOT, LEFT: ICD-10-CM

## 2022-03-25 DIAGNOSIS — M94.9 OSTEOCHONDRAL LESION OF TALAR DOME: ICD-10-CM

## 2022-03-25 PROCEDURE — 97161 PT EVAL LOW COMPLEX 20 MIN: CPT | Mod: GP | Performed by: PHYSICAL THERAPIST

## 2022-03-25 PROCEDURE — 97110 THERAPEUTIC EXERCISES: CPT | Mod: GP | Performed by: PHYSICAL THERAPIST

## 2022-03-25 NOTE — PROGRESS NOTES
[facility]: Physical Therapy Initial Evaluation   Mar 25, 2022    Subjective:   Chief Complaint: Left Ankle    Pain: focal tenderness    Numbness/Tingling: peripheral neuropathy   Weakness: concern for weakness   Stiffness: just related to swelling with edema  New/Recurrent/Chronic: New  DOI/onset: 2021   Referral Date: 3/8/2022 - Delfino Faust MD  Mechanism of onset: Started after a fall in December  - anemia   - changes in bowel/bladder   - cancer   - concussions/dizziness   - fibromyalgia   - history of fractures   - heart problems   - hepatitis   - high blood pressure   - numbness/tingling   - overweight   - osteoarthritis   - osteoporosis   - pain at night/rest   - thyroid problems   - weakness   - DDD, lumbar    - Degenerative scoliosis   - Multiple surgeries - see chart for full list   - Total hysterectomy, right thyroid lobectomy, right hyperparathyroidectomies, bilateral cataracts corrected, umbilical hernia repair (infant)  General health as reported by patient:  Fair   Medications: Bone density, HTN, Muscle Relaxants, Pain, Thyroid,   Occupation: Semi-retired RN  Job duties: computer work, prolonged sitting  Previous Treatment (Effect): heat (helps with pain pretty much any time) ;   [email or web address] - Left Ankle X-Ray Impression:  1. No acute osseous abnormality.  2. Osteochondral lesion of the medial talar dome with probable fragmentation. Mild to moderate tibiotalar joint degenerative change.   3. 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  Quality of Pain: tenderness   Pain: 0/10 at present, 3/10 with touch  Worse: touch,   Progression of Symptoms since onset: better   Hx of Falls: yes, in December, she hit her foot on her walker   Current Functional Status: SEE GOALS FLOWSHEET  - Sleeping: no disturbance from the foot   - general tenderness to the ankle while walking and perofrming ADLs  - home program - no knowledge of a home exercise program.   Transportation to  Therapy: Independent with transportation    Patient's goal(s): know who to strengthen it. Help it get stronger, and more stable.         Objective:    Standing Posture: increased pronation in the left ankle    Gait: antalgic gait with use of cane in the right hand    AROM (PROM): (* indicates patient's pain)   ROM R ROM L   Plantarflexion 60 45   DF, knee straight -2 2   DF, knee bent 12 17     Strength: (* indicates patient's pain)   MMT R MMT L   DF/Inv 3- 5   DF/Ev 3- 4-   PF/Ev 5 4-   PF/Inv  3 5       Ankle/Foot Mobilizations (hyper vs hypo): Joint mobility normal unless otherwise noted.   - Talocrual: hypomobile on the left  - Subtalar: hypomobilie on the left  - Talonavicular:   - Calcaneocuboid:   - Cubonavicular-cuniform:     Palpation: tenderness to palpation at the medial calcaneus on the left foot.             Assessment/Plan:    Patient is a 71 year old female with left side ankle complaints.    Patient has the following significant findings with corresponding treatment plan.                Referring Diagnosis: Osteochondral lesion of talar dome ; Primary osteoarthritis of left ankle     Pain -  hot/cold therapy, manual therapy, splint/taping/bracing/orthotics, self management, education and home program  Decreased ROM/flexibility - manual therapy, therapeutic exercise, therapeutic activity and home program  Decreased joint mobility - manual therapy, therapeutic exercise, therapeutic activity and home program  Decreased strength - therapeutic exercise, therapeutic activities and home program  Impaired balance - TBD  Impaired gait - gait training, assistive devices and home program  Decreased function - therapeutic activities and home program  Impaired posture - neuro re-education, therapeutic activities and home program        Therapy Evaluation Codes:   1) History comprised of:   Personal factors that impact the plan of care:      Age, Living environment and Past/current experiences.    Comorbidity  factors that impact the plan of care are:      Fibromyalgia, Osteoporosis, Overweight, Multiple Surgeries.     Medications impacting care: Bone density, Muscle relaxant and Pain.  2) Examination of Body Systems comprised of:   Body structures and functions that impact the plan of care:      Ankle, Lumbar spine, Pelvis and Sacral illiac joint.   Activity limitations that impact the plan of care are:      Walking, ADLs.  3) Clinical presentation characteristics are:   Stable/Uncomplicated.  4) Decision-Making    High complexity using standardized patient assessment instrument and/or measureable assessment of functional outcome.  Cumulative Therapy Evaluation is: Low complexity.    Previous and current functional limitations:  (See Goal Flow Sheet for this information)    Short term and Long term goals: (See Goal Flow Sheet for this information)     Communication ability:  Patient appears to be able to clearly communicate and understand verbal and written communication and follow directions correctly.  Treatment Explanation - The following has been discussed with the patient:   RX ordered/plan of care  Anticipated outcomes  Possible risks and side effects  This patient would benefit from PT intervention to resume normal activities.   Rehab potential is fair.    Frequency:  1 X week, once daily  Duration:  for 2 weeks  Discharge Plan:  Achieve all LTG.  Independent in home treatment program.  Reach maximal therapeutic benefit.    Please refer to the daily flowsheet for treatment today, total treatment time and time spent performing 1:1 timed codes.

## 2022-03-29 PROBLEM — M19.072 PRIMARY OSTEOARTHRITIS OF LEFT ANKLE: Status: ACTIVE | Noted: 2022-03-29

## 2022-03-29 PROBLEM — M25.572 PAIN IN JOINT INVOLVING ANKLE AND FOOT, LEFT: Status: ACTIVE | Noted: 2022-03-29

## 2022-03-29 NOTE — PROGRESS NOTES
[facility]    OUTPATIENT Physical Therapy ORTHOPEDIC EVALUATION  PLAN OF TREATMENT FOR OUTPATIENT REHABILITATION  (COMPLETE FOR INITIAL CLAIMS ONLY)  Patient's Last Name, First Name, M.I.  YOB: 1950  Nadira Perez    Provider s Name:  MARCELINO [facility]   Medical Record No.  [id number]   Start of Care Date:  03/25/22   Onset Date:   12/01/21   Type:     _X__PT   ___OT Medical Diagnosis:    Encounter Diagnoses   Name Primary?     Osteochondral lesion of talar dome      Primary osteoarthritis of left ankle         Treatment Diagnosis:  L Ankle Pain        Goals:     03/25/22 0500   Body Part   Goals listed below are for L Ankle   Goal #1   Goal #1 ambulation   Previous Functional Level No restrictions   Performance Level pain with walking up to 3/10   Performance Level Walk without pain   Rationale for safe outdoor household ambulation;for safe community ambulation;for safe household ambulation;for safe work place ambulation;to maintain proper body mechanics/posture while ambulating to avoid additional compensatory injury due to improper gait mechanics;to promote a healthy and active lifestyle   Due Date 04/26/22   Other Goal   Activity HEP   Current Functional Level No knowledge of appropriate HEP   LTG Target Performance Demonstrate independence wiht HEP   Rationale for self management   Due Date 04/12/22       Therapy Frequency:  1x per week  Predicted Duration of Therapy Intervention:  3 weeks    Jovon Zavaleta                 I CERTIFY THE NEED FOR THESE SERVICES FURNISHED UNDER        THIS PLAN OF TREATMENT AND WHILE UNDER MY CARE     (Physician attestation of this document indicates review and certification of the therapy plan).                       Certification Date From:  03/25/22   Certification Date To:  05/03/22    Referring Provider:  Delfino Henning  [place]    Initial Assessment        See Epic Evaluation SOC Date: 03/25/22

## 2022-04-04 ENCOUNTER — MYC MEDICAL ADVICE (OUTPATIENT)
Dept: OPHTHALMOLOGY | Facility: CLINIC | Age: 72
End: 2022-04-04
Payer: COMMERCIAL

## 2022-04-04 NOTE — TELEPHONE ENCOUNTER
DIAGNOSIS: Possible left 5th toe fx needs XR   APPOINTMENT DATE: 4.7.22   NOTES STATUS DETAILS   OFFICE NOTE from referring provider Internal 4.4.22 MyC Medical Advice   OFFICE NOTE from other specialist Internal Other fx records in chart   MEDICATION LIST Internal

## 2022-04-06 DIAGNOSIS — M79.672 LEFT FOOT PAIN: Primary | ICD-10-CM

## 2022-04-06 NOTE — TELEPHONE ENCOUNTER
Pt scheduled for clinic f/u with Dr. Oliveros 4/27 to discuss surgical planning.    Carmela Rivera COA 1:45 PM April 6, 2022

## 2022-04-06 NOTE — PROGRESS NOTES
[facility]  Sports Medicine Clinic  [facility]           SUBJECTIVE       Nadira Perez is a 71 year old female presenting to clinic today with left 5th toe pain.     Background:   Date of injury: 4/1/2022  Duration of symptoms: 6 days   Mechanism of Injury: stubbed toe on metal gate.   Intensity: 3/10   Aggravating factors: walking for an extended period of time.    Relieving Factors: Gabapentin, Tylenol, ice and rest.       PMH, Medications and Allergies were reviewed and updated as needed.    ROS:  As noted above otherwise negative.    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     Corneal opacity     Hypothyroidism     Osteopenia, unspecified location     Aromatase inhibitor use     Chronic pain of right knee     DDD (degenerative disc disease), lumbar     Degenerative scoliosis in adult patient     Peripheral neuropathy     Spinal stenosis of lumbar region with neurogenic claudication     Spondylolisthesis of lumbar region     Brain lesion     Primary osteoarthritis of left ankle     Pain in joint involving ankle and foot, left       Current Outpatient Medications   Medication Sig Dispense Refill     acetaminophen (TYLENOL) 325 MG tablet Take 2 tablets (650 mg) by mouth every 4 hours as needed for other 60 tablet 0     amLODIPine (NORVASC) 10 MG tablet Take 1 tablet (10 mg) by mouth daily (Patient taking differently: Take 10 mg by mouth daily Takes around 10 AM.) 90 tablet 3      Ascorbic Acid (VITAMIN C) 500 MG CHEW Take 1 tablet by mouth daily       atorvastatin (LIPITOR) 80 MG tablet Take 1 tablet (80 mg) by mouth daily (Patient taking differently: Take 80 mg by mouth every evening ) 90 tablet 3     Cholecalciferol (VITAMIN D3) 50 MCG (2000 UT) CAPS Take 6,000 Units by mouth daily (Patient taking differently: Take 6,000 Units by mouth daily (with lunch) ) 270 capsule 3     CRANBERRY EXTRACT PO Take 650 mg by mouth daily ~10 am  Takes 2       diclofenac (VOLTAREN) 1 % topical gel APPLY 4 GRAMS TO KNEES OR 2 GRAMS TO HANDS FOUR TIMES DAILY USING ENCLOSED DOSING CARD. 100 g 3     gabapentin (NEURONTIN) 300 MG capsule Take 1-2 capsules by mouth every 8 hours (Patient taking differently: Take 900 mg by mouth 3 times daily ) 540 capsule 3     HYDROcodone-acetaminophen (NORCO) 5-325 MG tablet Take 1 tablet by mouth every 6 hours as needed for severe pain Uses about 10 tablets per month. 30 tablet 0     hydrOXYzine (ATARAX) 10 MG tablet Take 1 tablet (10 mg) by mouth 2 times daily as needed for itching (pain adjuvant) 30 tablet 0     ibuprofen (ADVIL/MOTRIN) 600 MG tablet Take 1 tablet (600 mg) by mouth every 6 hours as needed for other (mild and/or inflammatory pain) 30 tablet 0     levothyroxine (SYNTHROID/LEVOTHROID) 112 MCG tablet TAKE 1/2 TABLET BY MOUTH DAILY 45 tablet 3     lisinopril (ZESTRIL) 40 MG tablet Take 1 tablet (40 mg) by mouth daily 90 tablet 3     melatonin 5 MG tablet Take 10 mg by mouth At Bedtime        methocarbamol (ROBAXIN) 500 MG tablet Take 1 tablet (500 mg) by mouth 3 times daily 40 tablet 0     metoprolol succinate ER (TOPROL-XL) 50 MG 24 hr tablet Take 1 tablet (50 mg) by mouth daily (Patient taking differently: Take 50 mg by mouth every morning ) 90 tablet 3     Multiple Vitamin (MULTI-VITAMIN) per tablet Take 1 tablet by mouth daily (with lunch)        ondansetron (ZOFRAN-ODT) 4 MG ODT tab Take 1 tablet (4 mg) by mouth every 12 hours as needed for nausea 180 tablet  "3     polyethylene glycol (MIRALAX) 17 g packet Take 17 g by mouth 2 times daily (Patient taking differently: Take 17 g by mouth 2 times daily as needed ) 7 packet 0     senna-docusate (SENOKOT-S/PERICOLACE) 8.6-50 MG tablet Take 2 tablets by mouth 2 times daily 30 tablet 0     spironolactone (ALDACTONE) 25 MG tablet Take 2 tablets (50 mg) by mouth daily 90 tablet 3     terbinafine (LAMISIL AT) 1 % external cream Apply topically 2 times daily For fungal infection not resolved with other antifungals (e.g. Clotrimazole) (Patient taking differently: Apply topically 2 times daily as needed (toenail fungus) For fungal infection not resolved with other antifungals (e.g. Clotrimazole)) 24 g 11     triamcinolone (KENALOG) 0.1 % external ointment Apply topically 2 times daily (Patient taking differently: Apply topically 2 times daily as needed ) 30 g 11     Vaginal Lubricant (REPHRESH) GEL Place 2 g vaginally every 3 days 2 g 3            OBJECTIVE:       Vitals:   Vitals:    04/07/22 1100   Weight: 73.9 kg (163 lb)   Height: 1.702 m (5' 7\")     BMI: Body mass index is 25.53 kg/m .    Gen:  Well nourished and in no acute distress  HEENT: Extraocular movement intact  Neck: Supple  Pulm:  Breathing Comfortably. No increased respiratory effort.  Psych: Euthymic. Appropriately answers questions    MSK: Left foot with mild erythema on the dorsal aspect of the fifth, small toe, phalanx.  Tenderness to palpation on the dorsal medial aspect of the fifth phalanx.  No rotational deformity noted.  Ankle range of motion full and intact.  Ankle strength intact.      XRAY : Small toe, middle phalanx, distal medial, mildly displaced fracture.  Will await official radiologic read.  No other acute osseous abnormalities visualized.          ASSESSMENT and PLAN:     Nadira was seen today for consult.    Diagnoses and all orders for this visit:    Left foot pain    71-year-old female with a history of osteoporosis, presenting to clinic today " with a week's history worth of acute left toe pain after acute injury to the foot.  X-rays were evaluated in detail with the patient.  It does seem to be an evidence of a mildly displaced middle phalanx distal fracture on the medial aspect.  Patient is also very tender there despite her peripheral neuropathy.  Options for management were discussed with the patient.  I do think it is reasonable to dorian tape her at this point.  We have also provided her with a postop shoe for comfort.  Would like to see her again in 3 weeks.  New x-rays at that time.  Likely progress out of the dorian taping and she is back to her normal footwear at that point.      Options for treatment and/or follow-up care were reviewed with the patient was actively involved in the decision making process. Patient verbalized understanding and was in agreement with the plan.    Rolan Morgan DO  , Sports Medicine  Department of Family Medicine and [facility]

## 2022-04-07 ENCOUNTER — PRE VISIT (OUTPATIENT)
Dept: ORTHOPEDICS | Facility: CLINIC | Age: 72
End: 2022-04-07
Payer: COMMERCIAL

## 2022-04-07 ENCOUNTER — OFFICE VISIT (OUTPATIENT)
Dept: ORTHOPEDICS | Facility: CLINIC | Age: 72
End: 2022-04-07
Payer: COMMERCIAL

## 2022-04-07 ENCOUNTER — ANCILLARY PROCEDURE (OUTPATIENT)
Dept: GENERAL RADIOLOGY | Facility: CLINIC | Age: 72
End: 2022-04-07
Attending: STUDENT IN AN ORGANIZED HEALTH CARE EDUCATION/TRAINING PROGRAM
Payer: COMMERCIAL

## 2022-04-07 ENCOUNTER — MYC MEDICAL ADVICE (OUTPATIENT)
Dept: INTERNAL MEDICINE | Facility: CLINIC | Age: 72
End: 2022-04-07

## 2022-04-07 VITALS — WEIGHT: 163 LBS | HEIGHT: 67 IN | BODY MASS INDEX: 25.58 KG/M2

## 2022-04-07 DIAGNOSIS — M79.672 LEFT FOOT PAIN: Primary | ICD-10-CM

## 2022-04-07 DIAGNOSIS — M79.672 LEFT FOOT PAIN: ICD-10-CM

## 2022-04-07 PROCEDURE — 73630 X-RAY EXAM OF FOOT: CPT | Mod: LT | Performed by: RADIOLOGY

## 2022-04-07 PROCEDURE — 99213 OFFICE O/P EST LOW 20 MIN: CPT | Performed by: STUDENT IN AN ORGANIZED HEALTH CARE EDUCATION/TRAINING PROGRAM

## 2022-04-07 NOTE — LETTER
4/7/2022      RE: Nadira Perez  [address]  [facility] [address]       [facility]  Sports Medicine Clinic  [facility]           SUBJECTIVE       Nadira Perez is a 71 year old female presenting to clinic today with left 5th toe pain.     Background:   Date of injury: 4/1/2022  Duration of symptoms: 6 days   Mechanism of Injury: stubbed toe on metal gate.   Intensity: 3/10   Aggravating factors: walking for an extended period of time.    Relieving Factors: Gabapentin, Tylenol, ice and rest.       PMH, Medications and Allergies were reviewed and updated as needed.    ROS:  As noted above otherwise negative.    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     Corneal opacity     Hypothyroidism     Osteopenia, unspecified location     Aromatase inhibitor use     Chronic pain of right knee     DDD (degenerative disc disease), lumbar     Degenerative scoliosis in adult patient     Peripheral neuropathy     Spinal stenosis of lumbar region with neurogenic claudication     Spondylolisthesis of lumbar region     Brain lesion     Primary osteoarthritis of left ankle     Pain in joint involving ankle and foot, left       Current Outpatient Medications   Medication Sig Dispense Refill     acetaminophen (TYLENOL) 325 MG tablet Take 2 tablets (650 mg) by mouth every 4 hours as needed for other 60 tablet 0     amLODIPine (NORVASC) 10 MG tablet Take 1 tablet (10 mg) by mouth daily  (Patient taking differently: Take 10 mg by mouth daily Takes around 10 AM.) 90 tablet 3     Ascorbic Acid (VITAMIN C) 500 MG CHEW Take 1 tablet by mouth daily       atorvastatin (LIPITOR) 80 MG tablet Take 1 tablet (80 mg) by mouth daily (Patient taking differently: Take 80 mg by mouth every evening ) 90 tablet 3     Cholecalciferol (VITAMIN D3) 50 MCG (2000 UT) CAPS Take 6,000 Units by mouth daily (Patient taking differently: Take 6,000 Units by mouth daily (with lunch) ) 270 capsule 3     CRANBERRY EXTRACT PO Take 650 mg by mouth daily ~10 am  Takes 2       diclofenac (VOLTAREN) 1 % topical gel APPLY 4 GRAMS TO KNEES OR 2 GRAMS TO HANDS FOUR TIMES DAILY USING ENCLOSED DOSING CARD. 100 g 3     gabapentin (NEURONTIN) 300 MG capsule Take 1-2 capsules by mouth every 8 hours (Patient taking differently: Take 900 mg by mouth 3 times daily ) 540 capsule 3     HYDROcodone-acetaminophen (NORCO) 5-325 MG tablet Take 1 tablet by mouth every 6 hours as needed for severe pain Uses about 10 tablets per month. 30 tablet 0     hydrOXYzine (ATARAX) 10 MG tablet Take 1 tablet (10 mg) by mouth 2 times daily as needed for itching (pain adjuvant) 30 tablet 0     ibuprofen (ADVIL/MOTRIN) 600 MG tablet Take 1 tablet (600 mg) by mouth every 6 hours as needed for other (mild and/or inflammatory pain) 30 tablet 0     levothyroxine (SYNTHROID/LEVOTHROID) 112 MCG tablet TAKE 1/2 TABLET BY MOUTH DAILY 45 tablet 3     lisinopril (ZESTRIL) 40 MG tablet Take 1 tablet (40 mg) by mouth daily 90 tablet 3     melatonin 5 MG tablet Take 10 mg by mouth At Bedtime        methocarbamol (ROBAXIN) 500 MG tablet Take 1 tablet (500 mg) by mouth 3 times daily 40 tablet 0     metoprolol succinate ER (TOPROL-XL) 50 MG 24 hr tablet Take 1 tablet (50 mg) by mouth daily (Patient taking differently: Take 50 mg by mouth every morning ) 90 tablet 3     Multiple Vitamin (MULTI-VITAMIN) per tablet Take 1 tablet by mouth daily (with lunch)        ondansetron  "(ZOFRAN-ODT) 4 MG ODT tab Take 1 tablet (4 mg) by mouth every 12 hours as needed for nausea 180 tablet 3     polyethylene glycol (MIRALAX) 17 g packet Take 17 g by mouth 2 times daily (Patient taking differently: Take 17 g by mouth 2 times daily as needed ) 7 packet 0     senna-docusate (SENOKOT-S/PERICOLACE) 8.6-50 MG tablet Take 2 tablets by mouth 2 times daily 30 tablet 0     spironolactone (ALDACTONE) 25 MG tablet Take 2 tablets (50 mg) by mouth daily 90 tablet 3     terbinafine (LAMISIL AT) 1 % external cream Apply topically 2 times daily For fungal infection not resolved with other antifungals (e.g. Clotrimazole) (Patient taking differently: Apply topically 2 times daily as needed (toenail fungus) For fungal infection not resolved with other antifungals (e.g. Clotrimazole)) 24 g 11     triamcinolone (KENALOG) 0.1 % external ointment Apply topically 2 times daily (Patient taking differently: Apply topically 2 times daily as needed ) 30 g 11     Vaginal Lubricant (REPHRESH) GEL Place 2 g vaginally every 3 days 2 g 3            OBJECTIVE:       Vitals:   Vitals:    04/07/22 1100   Weight: 73.9 kg (163 lb)   Height: 1.702 m (5' 7\")     BMI: Body mass index is 25.53 kg/m .    Gen:  Well nourished and in no acute distress  HEENT: Extraocular movement intact  Neck: Supple  Pulm:  Breathing Comfortably. No increased respiratory effort.  Psych: Euthymic. Appropriately answers questions    MSK: Left foot with mild erythema on the dorsal aspect of the fifth, small toe, phalanx.  Tenderness to palpation on the dorsal medial aspect of the fifth phalanx.  No rotational deformity noted.  Ankle range of motion full and intact.  Ankle strength intact.      XRAY : Small toe, middle phalanx, distal medial, mildly displaced fracture.  Will await official radiologic read.  No other acute osseous abnormalities visualized.          ASSESSMENT and PLAN:     Nadira was seen today for consult.    Diagnoses and all orders for this " visit:    Left foot pain    71-year-old female with a history of osteoporosis, presenting to clinic today with a week's history worth of acute left toe pain after acute injury to the foot.  X-rays were evaluated in detail with the patient.  It does seem to be an evidence of a mildly displaced middle phalanx distal fracture on the medial aspect.  Patient is also very tender there despite her peripheral neuropathy.  Options for management were discussed with the patient.  I do think it is reasonable to dorian tape her at this point.  We have also provided her with a postop shoe for comfort.  Would like to see her again in 3 weeks.  New x-rays at that time.  Likely progress out of the dorian taping and she is back to her normal footwear at that point.      Options for treatment and/or follow-up care were reviewed with the patient was actively involved in the decision making process. Patient verbalized understanding and was in agreement with the plan.    Rolan Morgan DO  , Sports Medicine  Department of Family Medicine and [facility]

## 2022-04-11 ENCOUNTER — PRE VISIT (OUTPATIENT)
Dept: OPHTHALMOLOGY | Facility: CLINIC | Age: 72
End: 2022-04-11

## 2022-04-11 ENCOUNTER — OFFICE VISIT (OUTPATIENT)
Dept: OPHTHALMOLOGY | Facility: CLINIC | Age: 72
End: 2022-04-11
Payer: COMMERCIAL

## 2022-04-11 DIAGNOSIS — Z96.1 PSEUDOPHAKIA OF BOTH EYES: ICD-10-CM

## 2022-04-11 DIAGNOSIS — Z11.59 ENCOUNTER FOR SCREENING FOR OTHER VIRAL DISEASES: Primary | ICD-10-CM

## 2022-04-11 DIAGNOSIS — H02.831 DERMATOCHALASIS OF BOTH UPPER EYELIDS: Primary | ICD-10-CM

## 2022-04-11 DIAGNOSIS — H02.832 DERMATOCHALASIS OF BOTH LOWER EYELIDS: ICD-10-CM

## 2022-04-11 DIAGNOSIS — H02.835 DERMATOCHALASIS OF BOTH LOWER EYELIDS: ICD-10-CM

## 2022-04-11 DIAGNOSIS — H02.834 DERMATOCHALASIS OF BOTH UPPER EYELIDS: Primary | ICD-10-CM

## 2022-04-11 PROCEDURE — 92081 LIMITED VISUAL FIELD XM: CPT | Mod: GC | Performed by: OPHTHALMOLOGY

## 2022-04-11 PROCEDURE — 99214 OFFICE O/P EST MOD 30 MIN: CPT | Mod: GC | Performed by: OPHTHALMOLOGY

## 2022-04-11 PROCEDURE — 92285 EXTERNAL OCULAR PHOTOGRAPHY: CPT | Mod: GC | Performed by: OPHTHALMOLOGY

## 2022-04-11 ASSESSMENT — TONOMETRY
IOP_METHOD: ICARE
OD_IOP_MMHG: 16
OS_IOP_MMHG: 17

## 2022-04-11 ASSESSMENT — MARGIN REFLEX DISTANCE
OS_MRD1: 2.5
OD_MRD1: 2

## 2022-04-11 ASSESSMENT — VISUAL ACUITY
OS_SC+: -2
OS_SC: 20/60
METHOD: SNELLEN - LINEAR
OD_SC: 20/25
OS_PH_SC: 20/30
OD_SC+: -2

## 2022-04-11 ASSESSMENT — EXTERNAL EXAM - LEFT EYE: OS_EXAM: NORMAL

## 2022-04-11 ASSESSMENT — EXTERNAL EXAM - RIGHT EYE: OD_EXAM: NORMAL

## 2022-04-11 NOTE — PATIENT INSTRUCTIONS
BLEPHAROPLASTY    Your eyes are often the first thing people notice about you and are an important aspect of your overall appearance. As we age, the tone and shape of our eyelids can loosen and sag. Heredity and sun exposure also contribute to this process. This excess, puffy or lax skin can make you appear more tired or appear older. Eyelid surgery or blepharoplasty (pronounced  fkmw-[id number]-ad-plasty ) can give the eyes a more youthful look by removing excess skin, bulging fat, and lax muscle from the upper or lower eyelids. If the sagging upper eyelid skin obstructs peripheral vision, blepharoplasty can eliminate the obstruction and expand the visual field.     Upper Blepharoplasty     For the upper eyelids, excess skin and fat are removed through an incision hidden in the natural eyelid crease. If the lid is droopy (ptosis), the muscle that raises the upper eyelid can be tightened. The incision is then closed with fine sutures.     Lower Blepharoplasty     Fat in the lower eyelids can be removed or repositioned through an incision hidden on the inner surface of the eyelid.  If there is excessive skin in the lower lid, the skin can be removed through incision is made just below the lashes. Fat can be removed or repositioned through this incision, and the excess skin removed. The incision is then closed with fine sutures.     Upper and Lower Blepharoplasty     Upper and lower blepharoplasty can be performed together and also can be combined with other procedures such as eyebrow or forehead lift, midface lift, face lift, neck lift, or laser skin resurfacing.  The procedures are typically performed as an outpatient procedure and typically take 45 min to 1.5 hours to perform.  Most patients can return to normal activities within 1-2 weeks. Makeup may be worn to camouflage any bruising after one week.       Who Should Perform A Blepharoplasty?     When choosing a surgeon to perform blepharoplasty, look for a cosmetic and  reconstructive surgeon who specializes in the eyelids, orbit, and tear drain system. Dr. Sanchez s membership in the American Society of Ophthalmic Plastic and Reconstructive Surgery (ASOPRS) indicates he is not only a board certified ophthalmologist who knows the anatomy and structure of the eyelids and orbit, but also has had extensive training in ophthalmic plastic reconstructive and cosmetic surgery.

## 2022-04-11 NOTE — LETTER
2022         RE:  :  MRN: Nadira Perez  1950  [id number]     Dear Matilde,    Thank you for asking me to see your patient, Nadira Perez, for an oculoplastic   consultation.  My assessment and plan are below.  For further details, please see my attached clinic note.      Assessment & Plan     Nadira Perez is a 71 year old female with the following diagnoses:   1. Dermatochalasis of both upper eyelids    2. Dermatochalasis of both lower eyelids       Not on anticoagulation  Has hx of arrhythmias   Hx breast cancer '07 s/p double mastectomy     PLAN:  Bilateral upper blepharoplasty  Bilateral lower lid cosmetic blepharoplasty      Again, thank you for allowing me to participate in the care of your patient.      Sincerely,    Jemal Sanchez MD  Department of Ophthalmology and Visual Neurosciences  [facility]    CC: MERCEDES Rivera CNP  [address]  [facility] [address]  Via In Basket

## 2022-04-11 NOTE — PROGRESS NOTES
Chief Complaints and History of Present Illnesses   Patient presents with     Droopy Eye Lid Evaluation     Pt here today for bilateral droopy lid evaluation for both top and bottom lids.  States lids are very puffy at the bottom and heavy on the top.   Feels like lids are constricting vision.    Ocular meds = Systane 1 gtts BE Q BID & prn    Anuni Walden [facility], LOLY 12:27 PM 04/11/2022          Chief Complaint(s) and History of Present Illness(es)     Droopy Eye Lid Evaluation     In right upper lid, right lower lid, left upper lid and left lower lid.    Severity is moderate.  Disease is chronic.  Treatments tried include   manual elevation of lid. Additional comments: Pt here today for bilateral   droopy lid evaluation for both top and bottom lids.  States lids are very puffy at the bottom and heavy on the top.   Feels like lids are constricting vision.    Ocular meds = Systane 1 gtts BE Q BID & prn    Anuni Walden [facility], LOLY 12:27 PM 04/11/2022                 FUNCTIONAL COMPLAINTS RELATED TO DROOPY EYELIDS/BROWS:  Nadira Perez describes upper lids interfering with superior visual field and interfering with activities of daily living including reading, driving and watching television.     EXAM:   Dominant eye RIGHT    MRD1: Right eye 2   Left eye 2.5  Dermatochalasis with excess skin touching eyelashes yes    VISUAL FIELD:  Right eye untaped:25 degrees Right eye taped:52 degrees  Left eye untaped:22 degrees Left eye taped:50 degrees    Right eye visual field improves by: 27 degrees  Left eye visual field improves by: 28 degrees    Lower lid evaluation  Vector: negative  Laxity: 1+ right eye 1+ left eye   Fat pads: 3+herniation right eye 3+herniation left eye  Tear trough: 1+ right eye 1+left eye  Skin excess: 2+ right eye 2+left eye             Assessment & Plan     Nadira Perez is a 71 year old female with the following diagnoses:   1. Dermatochalasis of both upper eyelids    2. Dermatochalasis  of both lower eyelids       Not on anticoagulation  Has hx of arrhythmias   Hx breast cancer '07 s/p double mastectomy     PLAN:  Bilateral upper blepharoplasty - S/O/NF  Bilateral lower lid cosmetic blepharoplasty - TCFx + SP + LCP            Leona Campbell MD  Oculoplastics Fellow    Attending Physician Attestation:  I have seen and examined this patient with the fellow .  I have confirmed and edited as necessary the chief complaint(s), history of present illness, review of systems, relevant history, and examination findings as documented by others.  I have personally reviewed the relevant tests, images, and reports as documented above.  I have confirmed and edited as necessary the assessment and plan and agree with this note.    - Jemal Sanchez MD 1:46 PM 4/11/2022    Today with Nadira Perez, I reviewed the indications, risks, benefits, and alternatives of the proposed surgical procedure including, but not limited to, failure obtain the desired result  and need for additional surgery, bleeding, infection, loss of vision, loss of the eye, and the remote possibility of permanent damage to any organ system or death with the use of anesthesia.  I provided multiple opportunities for the questions, answered all questions to the best of my ability, and confirmed that my answers and my discussion were understood.   - Jemal Sanchez MD 1:46 PM 4/11/2022

## 2022-04-11 NOTE — NURSING NOTE
Chief Complaints and History of Present Illnesses   Patient presents with     Droopy Eye Lid Evaluation     Pt here today for bilateral droopy lid evaluation for both top and bottom lids.  States lids are very puffy at the bottom and heavy on the top.   Feels like lids are constricting vision.    Ocular meds = Systane 1 gtts BE Q BID & prn    Anureta SCOTT, LOLY 12:27 PM 04/11/2022          Chief Complaint(s) and History of Present Illness(es)     Droopy Eye Lid Evaluation     Laterality: right upper lid, right lower lid, left upper lid and left lower lid    Severity: moderate    Onset: chronic    Treatments tried: manual elevation of lid    Comments: Pt here today for bilateral droopy lid evaluation for both top and bottom lids.  States lids are very puffy at the bottom and heavy on the top.   Feels like lids are constricting vision.    Ocular meds = Systane 1 gtts BE Q BID & prn    Anu Iram SCOTT, LOLY 12:27 PM 04/11/2022

## 2022-04-12 ENCOUNTER — TELEPHONE (OUTPATIENT)
Dept: OPHTHALMOLOGY | Facility: CLINIC | Age: 72
End: 2022-04-12
Payer: COMMERCIAL

## 2022-04-14 ENCOUNTER — OFFICE VISIT (OUTPATIENT)
Dept: INTERNAL MEDICINE | Facility: CLINIC | Age: 72
End: 2022-04-14
Payer: COMMERCIAL

## 2022-04-14 VITALS
OXYGEN SATURATION: 98 % | SYSTOLIC BLOOD PRESSURE: 124 MMHG | RESPIRATION RATE: 16 BRPM | WEIGHT: 163.3 LBS | BODY MASS INDEX: 25.63 KG/M2 | HEIGHT: 67 IN | DIASTOLIC BLOOD PRESSURE: 76 MMHG | HEART RATE: 85 BPM

## 2022-04-14 DIAGNOSIS — E78.49 OTHER HYPERLIPIDEMIA: ICD-10-CM

## 2022-04-14 DIAGNOSIS — R10.31 BILATERAL LOWER ABDOMINAL PAIN: ICD-10-CM

## 2022-04-14 DIAGNOSIS — R73.09 ELEVATED HEMOGLOBIN A1C: Primary | ICD-10-CM

## 2022-04-14 DIAGNOSIS — Z86.39 HISTORY OF LOW POTASSIUM: ICD-10-CM

## 2022-04-14 DIAGNOSIS — R10.32 BILATERAL LOWER ABDOMINAL PAIN: ICD-10-CM

## 2022-04-14 PROCEDURE — 99215 OFFICE O/P EST HI 40 MIN: CPT | Performed by: NURSE PRACTITIONER

## 2022-04-14 NOTE — PROGRESS NOTES
S: Nadira Perez is a 71 year old female with PSH of: 21 surgeries.     Now experiencing 1 month of left lower quadrant discomfort exacerbated when reclining. She rates the pain as a 2-3/10 at it's worst.  She was concerned that she might have an adhesion from her spinal surgery as the pain is located at the area of her anterior approach entry site.  She contacted her surgeon who explained that she went under the abdominal cavity so no risk of adhesions.  More lately she has developed similar but less severe discomfort in  Her RLQ.  SHe is planning a hernia surgery with Dr. Encarnacion and would like this pain further evaluated in case it also needs surgical attention. Her F/U appt with Dr. Encarnacion is May 3rd.   No change in BMs. No nausea.        She is also having palpitations and requests her potassium be checked.   Also requesting an A1C.      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     Corneal opacity     Hypothyroidism     Osteopenia, unspecified location     Aromatase inhibitor use     Chronic pain of right knee     DDD (degenerative disc disease), lumbar     Degenerative scoliosis in adult patient     Peripheral neuropathy     Spinal stenosis of lumbar region with neurogenic claudication     Spondylolisthesis of lumbar region     Brain lesion     Primary osteoarthritis of left ankle     Pain in joint involving ankle and foot, left     Dermatochalasis of both upper eyelids            Past Medical History:    Diagnosis Date     Arthritis      Bone disease      Breast cancer (H)      H/O kyphoplasty      Hearing problem      History of kidney stones      History of radiation therapy      Hyperlipemia      Hypertension      Hypopotassemia      Kidney problem      Lymph edema      Medullary sponge kidney      Osteopenia      PONV (postoperative nausea and vomiting)      Reduced vision      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     Thyroid disease           Past Surgical History:   Procedure Laterality Date     ABDOMEN SURGERY      ovarian cyst, mesh     ARTHRODESIS WRIST Right      ARTHRODESIS WRIST  02/14/2013    Procedure: ARTHRODESIS WRIST;  left wrist scaphoid excision, four bone fusion, iliac crest bone graft  ( Mac with block);  Surgeon: Av Mendez MD;  [facility]     BIOPSY      skin, vaginal     CATARACT IOL, RT/LT Right 03/13/2018     CATARACT IOL, RT/LT Left 02/20/2018     COLONOSCOPY  12/24/2013    Procedure: COMBINED COLONOSCOPY, SINGLE BIOPSY/POLYPECTOMY BY BIOPSY;  COLONOSCOPY;  Surgeon: Dom Alvarez MD;  Location:  GI     ESOPHAGOSCOPY, GASTROSCOPY, DUODENOSCOPY (EGD), COMBINED N/A 11/23/2016    Procedure: COMBINED ESOPHAGOSCOPY, GASTROSCOPY, DUODENOSCOPY (EGD);  Surgeon: Quinten Feliciano MD;  Location:  GI     EXTERNAL EAR SURGERY      right     EYE SURGERY      radial keratomy     FUSION, SPINE, INTERBODY, OBLIQUE ANTERIOR AND LUMBAR, 2 LEVELS, POST APPROACH, USING OTS N/A 11/18/2021    Procedure: Part 1: Oblique Anterior Interbody Fusion at Lumbar 5 to sacral 1 with use of Bone Morphogenic Protein,;  Surgeon: Serge Ramirez MD;  [facility]: [facility]     GRAFT BONE FROM ILIAC CREST  02/14/2013    Procedure: GRAFT BONE FROM ILIAC CREST;  mac with block and local infilitration;  Surgeon: Av Mendez MD;  [facility]     HC BREATH HYDROGEN TEST N/A 10/14/2016    Procedure: HYDROGEN BREATH TEST;  Surgeon: Cheri Barron MD;   Location: [facility]     HERNIA REPAIR      umbilical age 18 mos.     HYSTERECTOMY TOTAL ABDOMINAL  05/03/2000     MASTECTOMY MODIFIED RADICAL Bilateral     bilateral; right breast prophylactic     OPTICAL TRACKING SYSTEM FUSION POSTERIOR SPINE LUMBAR N/A 11/18/2021    Procedure: Open Posterior Instrumented Spinal Fusion at Lumbar 5 to Sacral 1, with grimm aguayo osteotomy, use of O-Arm/Stealth;  Surgeon: Serge Ramirez MD;  [facility]     PARATHYROIDECTOMY  09/23/2004    R SUPERIOR     PARATHYROIDECTOMY  09/23/2004    parathyroid resection, subtotal     RELEASE CARPAL TUNNEL Right 12/2/2021    Procedure: RIGHT CARPAL TUNNEL RELEASE, RIGHT WRIST HARDWARE REMOVAL, RIGHT CARPAL BOSS EXCISION;  Surgeon: Rolan Conley MD;  Location: [facility] OR     REMOVE HARDWARE HAND  09/24/2013    Procedure: REMOVE HARDWARE HAND;  Left Hand Screw Removal        RHINOPLASTY  1968     thyr proc skin closed cosmetic manner by subcuticular stitch  01/23/2009     THYROPLASTY  10/09/2009     TONSILLECTOMY  1977     WRIST SURGERY      wrist arthrodesis          Social History     Tobacco Use     Smoking status: Never Smoker     Smokeless tobacco: Never Used   Substance Use Topics     Alcohol use: Not Currently     Alcohol/week: 7.0 standard drinks     Types: 7 Glasses of wine per week     Comment: Rare to occasional          Allergies   Allergen Reactions     Erythromycin Nausea     Penicillins Hives     Around age 6 - doesn't recall full reaction, mother told her it was hives.     Has tolerated cephalsporins.           Current Outpatient Medications   Medication Sig Dispense Refill     acetaminophen (TYLENOL) 325 MG tablet Take 2 tablets (650 mg) by mouth every 4 hours as needed for other 60 tablet 0     amLODIPine (NORVASC) 10 MG tablet Take 1 tablet (10 mg) by mouth daily (Patient taking differently: Take 10 mg by mouth daily Takes around 10 AM.) 90 tablet 3     Ascorbic Acid (VITAMIN C) 500 MG CHEW Take 1 tablet by mouth daily        atorvastatin (LIPITOR) 80 MG tablet Take 1 tablet (80 mg) by mouth daily (Patient taking differently: Take 80 mg by mouth every evening) 90 tablet 3     Cholecalciferol (VITAMIN D3) 50 MCG (2000 UT) CAPS Take 6,000 Units by mouth daily (Patient taking differently: Take 6,000 Units by mouth daily (with lunch)) 270 capsule 3     CRANBERRY EXTRACT PO Take 650 mg by mouth daily ~10 am  Takes 2       diclofenac (VOLTAREN) 1 % topical gel APPLY 4 GRAMS TO KNEES OR 2 GRAMS TO HANDS FOUR TIMES DAILY USING ENCLOSED DOSING CARD. 100 g 3     gabapentin (NEURONTIN) 300 MG capsule Take 1-2 capsules by mouth every 8 hours (Patient taking differently: Take 900 mg by mouth 3 times daily) 540 capsule 3     HYDROcodone-acetaminophen (NORCO) 5-325 MG tablet Take 1 tablet by mouth every 6 hours as needed for severe pain Uses about 10 tablets per month. 30 tablet 0     hydrOXYzine (ATARAX) 10 MG tablet Take 1 tablet (10 mg) by mouth 2 times daily as needed for itching (pain adjuvant) 30 tablet 0     ibuprofen (ADVIL/MOTRIN) 600 MG tablet Take 1 tablet (600 mg) by mouth every 6 hours as needed for other (mild and/or inflammatory pain) 30 tablet 0     levothyroxine (SYNTHROID/LEVOTHROID) 112 MCG tablet TAKE 1/2 TABLET BY MOUTH DAILY 45 tablet 3     lisinopril (ZESTRIL) 40 MG tablet Take 1 tablet (40 mg) by mouth daily 90 tablet 3     melatonin 5 MG tablet Take 10 mg by mouth At Bedtime        methocarbamol (ROBAXIN) 500 MG tablet Take 1 tablet (500 mg) by mouth 3 times daily 40 tablet 0     metoprolol succinate ER (TOPROL-XL) 50 MG 24 hr tablet Take 1 tablet (50 mg) by mouth daily (Patient taking differently: Take 50 mg by mouth every morning) 90 tablet 3     Multiple Vitamin (MULTI-VITAMIN) per tablet Take 1 tablet by mouth daily (with lunch)       ondansetron (ZOFRAN-ODT) 4 MG ODT tab Take 1 tablet (4 mg) by mouth every 12 hours as needed for nausea 180 tablet 3     polyethylene glycol (MIRALAX) 17 g packet Take 17 g by mouth 2  "times daily (Patient taking differently: Take 17 g by mouth 2 times daily as needed) 7 packet 0     senna-docusate (SENOKOT-S/PERICOLACE) 8.6-50 MG tablet Take 2 tablets by mouth 2 times daily 30 tablet 0     spironolactone (ALDACTONE) 25 MG tablet Take 2 tablets (50 mg) by mouth daily 90 tablet 3     triamcinolone (KENALOG) 0.1 % external ointment Apply topically 2 times daily (Patient taking differently: Apply topically 2 times daily as needed) 30 g 11     Vaginal Lubricant (REPHRESH) GEL Place 2 g vaginally every 3 days 2 g 3       REVIEW OF SYSTEMS:  See above.    O:   /76 (BP Location: Right arm, Patient Position: Sitting, Cuff Size: Adult Regular)   Pulse 85   Resp 16   Ht 1.702 m (5' 7\")   Wt 74.1 kg (163 lb 4.8 oz)   SpO2 98%   Breastfeeding No   BMI 25.58 kg/m    GENERAL APPEARANCE: ambulates slightly stooped with a cane.  ABDOMEN:  soft, tender to palpation over left lower quadrant, and very slightly tender to palpation over RLQ.  NEURO: alert and oriented.  PSYCHE: normal.    A/P:  Nadira was seen today for abdominal pain.    Diagnoses and all orders for this visit:    Elevated hemoglobin A1c  -     Hemoglobin A1c; Future    Other hyperlipidemia  -     Lipid panel reflex to direct LDL Fasting; Future    History of low potassium  -     Potassium; Future    Bilateral lower abdominal pain  -     CT Abdomen/Pelvis w/o contrast; Future      The patient voiced understanding of the information discussed and all questions were answered.     Total time spent today with this patient including chart review, exam time with patient and documentation : 40 minutes.      Matilde LONG, BLUE                  "

## 2022-04-14 NOTE — NURSING NOTE
"Nadira Perez is a 71 year old female patient that presents today in clinic for the following:    Chief Complaint   Patient presents with     Abdominal Pain     O: the patient reports a history of hernias; P: she reports that reclining makes it worst; Q: dull ache; R: bilateral, left side pain is a surprise, localized pain; S: 2/10; T: she note that it started about a month ago     The patient's allergies and medications were reviewed as noted. A set of vitals were recorded as noted without incident: /76 (BP Location: Right arm, Patient Position: Sitting, Cuff Size: Adult Regular)   Pulse 85   Resp 16   Ht 1.702 m (5' 7\")   Wt 74.1 kg (163 lb 4.8 oz)   SpO2 98%   Breastfeeding No   BMI 25.58 kg/m  . The patient does not have any other questions for the provider.    Moose Marcano, EMT at 5:23 PM on 4/14/2022  "

## 2022-04-14 NOTE — PATIENT INSTRUCTIONS
Thank you for visiting the Primary Care Center today at the [facility]! The following is some information about our clinic:     Primary Care Center Frequently-Asked Questions    (1) How do I schedule appointments at the [facility]?     Primary Care--to schedule or make changes to an existing appointment, please call our primary care line at [phone].  Labs--to schedule a lab appointment at the [facility] you can use [facility] or call [phone]. If you have a [place] location that is closer to home, you can reach out to that location for scheduling options.   Imaging--if you need to schedule a CT, X-ray, MRI, ultrasound, or other imaging study, you can call [phone] to schedule at the [facility] or any other [facility] imaging location.   Referrals--if a referral to another specialty was ordered you can expect a phone call from their scheduling team. If you have not heard from them in a week, please call us or send us a [facility] message to check the status or get a scheduling number. Please note that this only applies to internal [facility] referrals. If the referral is external you would need to contact their office for scheduling.     (2) I have a question about my visit, who do I contact?     You can call us at the primary care line at [phone] to ask questions about your visit. You can also send a secure message through [facility], which is reviewed by clinic staff. Please note that [facility] messages have a twenty-four to forty-eight business hour turnaround time and should not be used for urgent concerns.    (3) How will I get the results of my tests?    If you are signed up for [facility] all tests will be released to you within twenty-four hours of resulting. Please allow three to five days for your doctor to review your results and place a note interpreting the results. If you do not have [employer] you will receive your results  through mail seven to ten business days following the return of the tests. Please note that if there should be any urgent or concerning results that your doctor or their registered nurse will reach out to you the same day as the tests come back. If you have follow up questions about your results or would like to discuss the results in detail please schedule a follow up with your provider either in person or virtually.     (4) How do I get refills of my prescriptions?     You should always first contact your pharmacy for refills of your medications. If submitting a refill request on [employer], please be sure to submit the request only once--repeat requests can cause delays in refill. If you are requesting a NEW medication or a medication related to new symptoms you will need to schedule an appointment with a provider prior to approval. Please note: Routine medication refills have up to one to three business day turnaround whereas controlled substances refills have up to five to seven business day turnaround.    (5) I have new symptoms, what do I do?     If you are having an immediate medical emergency, you should dial 911 for assistance.   For anything urgent that needs to be seen within a few hours to one day you should visit a local urgent care for assistance.  For non-urgent symptoms that need to be seen within a few days to a week you can schedule with an available provider in primary care by going to [facility] or calling [phone].   If you are not sure how serious your symptoms are or you would like to receive medical advice you can always call [phone] to speak with a triage nurse.

## 2022-04-15 ENCOUNTER — MYC MEDICAL ADVICE (OUTPATIENT)
Dept: SURGERY | Facility: CLINIC | Age: 72
End: 2022-04-15
Payer: COMMERCIAL

## 2022-04-15 ENCOUNTER — LAB (OUTPATIENT)
Dept: LAB | Facility: CLINIC | Age: 72
End: 2022-04-15
Payer: COMMERCIAL

## 2022-04-15 DIAGNOSIS — Z86.39 HISTORY OF LOW POTASSIUM: ICD-10-CM

## 2022-04-15 DIAGNOSIS — E78.49 OTHER HYPERLIPIDEMIA: ICD-10-CM

## 2022-04-15 DIAGNOSIS — R73.09 ELEVATED HEMOGLOBIN A1C: ICD-10-CM

## 2022-04-15 LAB
CHOLEST SERPL-MCNC: 167 MG/DL
FASTING STATUS PATIENT QL REPORTED: YES
HBA1C MFR BLD: 5.9 % (ref 0–5.6)
HDLC SERPL-MCNC: 67 MG/DL
LDLC SERPL CALC-MCNC: 71 MG/DL
NONHDLC SERPL-MCNC: 100 MG/DL
POTASSIUM BLD-SCNC: 3.9 MMOL/L (ref 3.4–5.3)
TRIGL SERPL-MCNC: 145 MG/DL

## 2022-04-15 PROCEDURE — 83036 HEMOGLOBIN GLYCOSYLATED A1C: CPT

## 2022-04-15 PROCEDURE — 84132 ASSAY OF SERUM POTASSIUM: CPT

## 2022-04-15 PROCEDURE — 80061 LIPID PANEL: CPT

## 2022-04-15 PROCEDURE — 36415 COLL VENOUS BLD VENIPUNCTURE: CPT

## 2022-04-19 ENCOUNTER — VIRTUAL VISIT (OUTPATIENT)
Dept: PHARMACY | Facility: CLINIC | Age: 72
End: 2022-04-19
Payer: COMMERCIAL

## 2022-04-19 ENCOUNTER — HOSPITAL ENCOUNTER (OUTPATIENT)
Dept: CT IMAGING | Facility: CLINIC | Age: 72
Discharge: HOME | End: 2022-04-19
Attending: NURSE PRACTITIONER | Admitting: NURSE PRACTITIONER
Payer: COMMERCIAL

## 2022-04-19 DIAGNOSIS — Z78.9 TAKES DIETARY SUPPLEMENTS: ICD-10-CM

## 2022-04-19 DIAGNOSIS — R10.31 BILATERAL LOWER ABDOMINAL PAIN: ICD-10-CM

## 2022-04-19 DIAGNOSIS — E78.00 PURE HYPERCHOLESTEROLEMIA: ICD-10-CM

## 2022-04-19 DIAGNOSIS — G47.00 INSOMNIA, UNSPECIFIED TYPE: ICD-10-CM

## 2022-04-19 DIAGNOSIS — R10.32 BILATERAL LOWER ABDOMINAL PAIN: ICD-10-CM

## 2022-04-19 DIAGNOSIS — I10 BENIGN ESSENTIAL HYPERTENSION: Primary | ICD-10-CM

## 2022-04-19 DIAGNOSIS — M79.2 NEUROPATHIC PAIN: ICD-10-CM

## 2022-04-19 PROCEDURE — 99607 MTMS BY PHARM ADDL 15 MIN: CPT | Performed by: PHARMACIST

## 2022-04-19 PROCEDURE — 99606 MTMS BY PHARM EST 15 MIN: CPT | Performed by: PHARMACIST

## 2022-04-19 PROCEDURE — 74177 CT ABD & PELVIS W/CONTRAST: CPT

## 2022-04-19 PROCEDURE — 250N000011 HC RX IP 250 OP 636: Performed by: NURSE PRACTITIONER

## 2022-04-19 PROCEDURE — 250N000009 HC RX 250: Performed by: NURSE PRACTITIONER

## 2022-04-19 RX ORDER — IOPAMIDOL 755 MG/ML
500 INJECTION, SOLUTION INTRAVASCULAR ONCE
Status: COMPLETED | OUTPATIENT
Start: 2022-04-19 | End: 2022-04-19

## 2022-04-19 RX ORDER — ACETAMINOPHEN 500 MG
1000 TABLET ORAL 3 TIMES DAILY
COMMUNITY

## 2022-04-19 RX ADMIN — SODIUM CHLORIDE 51 ML: 9 INJECTION, SOLUTION INTRAVENOUS at 16:10

## 2022-04-19 RX ADMIN — IOPAMIDOL 82 ML: 755 INJECTION, SOLUTION INTRAVENOUS at 16:10

## 2022-04-19 NOTE — PROGRESS NOTES
Medication Therapy Management (MTM) Encounter    ASSESSMENT:                            Medication Adherence/Access: No issues identified    Pain: Will send refill of gabapentin.     Hypertension: Stable. Patient is meeting blood pressure goal of < 130/80mmHg.    Hyperlipidemia: Stable.    Vitamin D Deficiency: This is being held by ortho. She will be following up with them on this. If she were to resume 2000 units daily would likely be sufficient for her going forward.     Insomnia: Since her biggest problem is sleep maintenance she could potentially try Belsomra which is an Orexin inhibitor and is used specifically for sleep maintenance.    PLAN:                            1. Increase gabapentin back to 900 mg three times a day  2. I will message Matilde about starting Belsomra for you.     Follow-up: Return in about 6 months (around 10/20/2022).    SUBJECTIVE/OBJECTIVE:                          Nadira Perez is a 71 year old female contacted via secure video for a follow-up visit.  Today's visit is a follow-up MTM visit from 1/17/22     Reason for visit: hypertension follow-up.    Allergies/ADRs: Reviewed in chart  Past Medical History: Reviewed in chart  Tobacco: She reports that she has never smoked. She has never used smokeless tobacco.    Medication Adherence/Access: no issues reported    Pain: She notes that she is taking gabapentin 900 mg three times a day and would like a refill for this amount as it is helping her pain without causing side effects.     Hypertension: Current medications include metoprolol XL 50 mg at 10 AM, she takes the amlodipine 10 mg at 10 pm, she takes lisinopril 40 mg at 800 am, and spironolactone 50 mg daily at 10 am.  She reports that its sometimes hard to get all three in. Patient does self-monitor blood pressure. Home BP monitoring in range of 130-140s/80s mmHg.  Reports that her pedal edema is slightly better on higher dose of spironolactone. Generally 120-80-90 mmHg. Did have  one blood pressure that was in the 90's/60's mmHg - did not feel symptomatic when that happened.      BP Readings from Last 3 Encounters:   04/14/22 124/76   02/07/22 (!) 135/92   01/17/22 111/75       Potassium   Date Value Ref Range Status   04/15/2022 3.9 3.4 - 5.3 mmol/L Final   11/18/2021 2.6 (LL) 3.4 - 5.3 mmol/L Final   05/07/2021 3.2 (L) 3.4 - 5.3 mmol/L Final   05/07/2021 3.3 (L) 3.4 - 5.3 mmol/L Final       Hyperlipidemia: Current therapy includes atorvastatin 80 mg daily.  Patient reports no significant myalgias or other side effects. Did not get updated lipid value since last visit.     Recent Labs   Lab Test 04/15/22  1632 05/07/21  1234 10/27/16  1121 10/30/15  1221 11/06/14  1141   CHOL 167 217*   < > 182 228*   HDL 67 60   < > 48* 63   LDL 71 111*   < > 102 125   TRIG 145 229*   < > 159* 198*   CHOLHDLRATIO  --   --   --  3.8 3.6    < > = values in this interval not displayed.     Vitamin D: The vitamin d 6000 units is on hold due to high levels.    Vitamin D Deficiency Screening Results:  Lab Results   Component Value Date    VITDT 87 (H) 01/12/2022    VITDT 69 05/07/2021    VITDT 56 05/12/2020    VITDT 62 11/11/2019    VITDT 68 11/13/2018       Insomnia: Currently taking melatonin but it is still having difficulty staying asleep and will wake up around 2 am or 4 am and have trouble going back to sleep.         Today's Vitals: There were no vitals taken for this visit.  ----------------      I spent 39 minutes with this patient today. All changes were made via collaborative practice agreement with MERCEDES Kyle CNP. A copy of the visit note was provided to the patient's provider(s).    The patient was sent via [employer] a summary of these recommendations.     Willis So, Pharm. D., BCACP  Medication Therapy Management Pharmacist  Direct Voicemail: [phone]    Telemedicine Visit Details  Type of service:  Telephone visit  Start Time: 1:00 pm  End Time: 1:39 PM  Originating Location  (patient location): Home  Distant Location (provider location):  [facility]     Medication Therapy Recommendations  Insomnia, unspecified type    Rationale: Condition refractory to medication - Ineffective medication - Effectiveness   Recommendation: Start Medication   Status: Contact Provider - Awaiting Response

## 2022-04-20 ENCOUNTER — MYC MEDICAL ADVICE (OUTPATIENT)
Dept: ORTHOPEDICS | Facility: CLINIC | Age: 72
End: 2022-04-20
Payer: COMMERCIAL

## 2022-04-20 DIAGNOSIS — N28.89 RENAL MASS, LEFT: Primary | ICD-10-CM

## 2022-04-21 ENCOUNTER — TELEPHONE (OUTPATIENT)
Dept: OPHTHALMOLOGY | Facility: CLINIC | Age: 72
End: 2022-04-21
Payer: COMMERCIAL

## 2022-04-21 DIAGNOSIS — M54.16 LUMBAR RADICULOPATHY: Primary | ICD-10-CM

## 2022-04-21 RX ORDER — GABAPENTIN 300 MG/1
900 CAPSULE ORAL 3 TIMES DAILY
Qty: 270 CAPSULE | Refills: 11 | Status: SHIPPED | OUTPATIENT
Start: 2022-04-21 | End: 2022-11-02

## 2022-04-21 NOTE — TELEPHONE ENCOUNTER
Spoke with patient regarding scheduling POST-OP for later same day. Rescheduled patient accordingly. Patient is aware of time and location.-Per Patient

## 2022-04-21 NOTE — PATIENT INSTRUCTIONS
"Recommendations from today's MTM visit:                                                       1. Increase gabapentin back to 900 mg three times a day  2. I will message Matilde about starting Belsomra for you.     Follow-up: Return in about 6 months (around 10/20/2022).    It was great speaking with you today.  I value your experience and would be very thankful for your time in providing feedback in our clinic survey. In the next few days, you may receive an email or text message from [employer] with a link to a survey related to your  clinical pharmacist.\"     To schedule another MTM appointment, please call the clinic directly or you may call the MTM scheduling line at [phone] or toll-free at [phone].     My Clinical Pharmacist's contact information:                                                      Please feel free to contact me with any questions or concerns you have.      Willis So, Pharm. D., [facility]  Medication Therapy Management Pharmacist  Direct Voicemail: [phone]     "

## 2022-04-26 NOTE — TELEPHONE ENCOUNTER
I spoke with Ismael and scheduled an appointment for her for both of her wrists. She decided Rishabh on 5/3 would work best for her. She thanked me for my call and had no further questions.  Jami Suarez ATC

## 2022-04-27 ENCOUNTER — MYC MEDICAL ADVICE (OUTPATIENT)
Dept: ORTHOPEDICS | Facility: CLINIC | Age: 72
End: 2022-04-27
Payer: COMMERCIAL

## 2022-04-27 ENCOUNTER — OFFICE VISIT (OUTPATIENT)
Dept: OPHTHALMOLOGY | Facility: CLINIC | Age: 72
End: 2022-04-27
Attending: OPHTHALMOLOGY
Payer: COMMERCIAL

## 2022-04-27 DIAGNOSIS — E67.3 HYPERVITAMINOSIS D: ICD-10-CM

## 2022-04-27 DIAGNOSIS — H52.13 MYOPIA OF BOTH EYES: ICD-10-CM

## 2022-04-27 DIAGNOSIS — H33.312 RETINAL TEAR OF LEFT EYE: Primary | ICD-10-CM

## 2022-04-27 DIAGNOSIS — H43.812 PVD (POSTERIOR VITREOUS DETACHMENT), LEFT EYE: ICD-10-CM

## 2022-04-27 DIAGNOSIS — E55.9 HYPOVITAMINOSIS D: Primary | ICD-10-CM

## 2022-04-27 DIAGNOSIS — H43.392 VITREOUS OPACITIES OF LEFT EYE: ICD-10-CM

## 2022-04-27 DIAGNOSIS — Z96.1 PSEUDOPHAKIA OF BOTH EYES: ICD-10-CM

## 2022-04-27 PROCEDURE — G0463 HOSPITAL OUTPT CLINIC VISIT: HCPCS

## 2022-04-27 PROCEDURE — 92014 COMPRE OPH EXAM EST PT 1/>: CPT | Performed by: OPHTHALMOLOGY

## 2022-04-27 ASSESSMENT — TONOMETRY
OS_IOP_MMHG: 19
IOP_METHOD: TONOPEN
OD_IOP_MMHG: 19

## 2022-04-27 ASSESSMENT — CONF VISUAL FIELD
OS_NORMAL: 1
OD_NORMAL: 1
METHOD: COUNTING FINGERS

## 2022-04-27 ASSESSMENT — VISUAL ACUITY
OS_PH_SC: 20/40
OS_SC: 20/50
OD_SC+: -3
OS_SC+: +2
METHOD: SNELLEN - LINEAR
OD_SC: 20/20

## 2022-04-27 ASSESSMENT — EXTERNAL EXAM - RIGHT EYE: OD_EXAM: NORMAL

## 2022-04-27 ASSESSMENT — EXTERNAL EXAM - LEFT EYE: OS_EXAM: NORMAL

## 2022-04-27 NOTE — NURSING NOTE
"Chief Complaints and History of Present Illnesses   Patient presents with     Follow Up     Follow up for Posterior vitreous detachment (PVD) with sub hyaloid hemorrhage left eye. Patient is ready for surgery left eye.   Patient notes vision is still cloudy vision left eye at distance and near. Denies any changes in vision in the last few months. Patient is scheduled for a Blepharoplasty on 5/6/22. \"I would like to have the Vitrectomy scheduled.\"     NILS Granados 2:05 PM 04/27/2022       Chief Complaint(s) and History of Present Illness(es)     Follow Up     Laterality: left eye    Onset: months ago    Course: stable    Associated symptoms: dryness (continuously, using Refresh), redness (noted by friend the other day) and floaters.  Negative for eye pain and flashes    Pain scale: 0/10    Comments: Follow up for Posterior vitreous detachment (PVD) with sub hyaloid hemorrhage left eye. Patient is ready for surgery left eye.   Patient notes vision is still cloudy vision left eye at distance and near. Denies any changes in vision in the last few months. Patient is scheduled for a Blepharoplasty on 5/6/22. \"I would like to have the Vitrectomy scheduled.\"     NISL Granados 2:05 PM 04/27/2022                  "

## 2022-04-27 NOTE — PROGRESS NOTES
CC: floaters left eye     Interval History:  she still has a little of floaters / dark spot - now it getting smaller. No longer having flashes of lights. She is disturbed by the floaters in her eye. Not having flashes of lights.  Has YAG Capsulotomy left eye last visit.     HPI: Patient fell 9/24/2020.  Blurry vision left eye starting around 10/2/2020. Noted to have HST - now s/p barricade laser 1/18/2021.    Assessment/plan:   # visually significant floaters left eye   Always noticeable; interfering with her daily activities  Prominent tao ring and remnants of POC s/p yag laser may be causing symptoms    Discussed tx options including observation vs yag floaterectomy vs PPV.  Risks of PPV surgery including but not limited to infection (rare but devastating complication that will need antibiotics injection and more surgeries), risk of retinal detachment (more common but it can be fixed with further surgeries), IOL subluxation, increased or decreased intraocular pressure that may need further medications, need for face down positioning if gas or oil bubble placed in the eye were discussed with the patient. We discussed about the benefits of PPV surgery and its alternatives. All the questions answered. Ismael agreed and wanted to proceed.    Plan for PPV left eye (likely will need endolaser to reinforce previous laser treatments around the retinal tear treated before)    60 minutes   POD1 and POW1 appt  Fellow contribution       # Posterior vitreous detachment (PVD) with sub hyaloid hemorrhage OS   - Fall 9/24/2020; acute blurry vision 10/2/2020   - HST at inferotemporal periphery - s/p retinopexy 1/18/21   - S/Sx of RD discussed for immediate return     # Retinal tear left eye   - see #1; s/p barricade laser 1/18/21; repeat depressed exam 1/19/22no new findings    # Pseudophakia each eye   - both eye s/p yag capsulotomy each eye      # Dry eye each eye   - ATs PRN    # ERM left eye   - Mild; observe    RTC: for  POD1 and POW1      Complete documentation of historical and exam elements from today's encounter can be found in the full encounter summary report (not reduplicated in this progress note). I personally obtained the chief complaint(s) and history of present illness.  I confirmed and edited as necessary the review of systems, past medical/surgical history, family history, social history, and examination findings as documented by others; and I examined the patient myself. I personally reviewed the relevant tests, images, and reports as documented above. I formulated and edited as necessary the assessment and plan and discussed the findings and management plan with the patient and family.    Felix Oliveros MD

## 2022-04-27 NOTE — PROGRESS NOTES
[facility]  Sports Medicine Clinic  Clinics and Surgery Center           SUBJECTIVE       Nadira Perez is a 71 year old female presenting to clinic today with concerns for left amall toe phalanx fracture.    4/7/22: 71-year-old female with a history of osteoporosis, presenting to clinic today with a week's history worth of acute left toe pain after acute injury to the foot.  X-rays were evaluated in detail with the patient.  It does seem to be an evidence of a mildly displaced middle phalanx distal fracture on the medial aspect.  Patient is also very tender there despite her peripheral neuropathy.  Options for management were discussed with the patient.  I do think it is reasonable to buddy tape her at this point.  We have also provided her with a postop shoe for comfort.  Would like to see her again in 3 weeks.  New x-rays at that time.  Likely progress out of the buddy taping and she is back to her normal footwear at that point.    Interval hx: Patient returns to clinic today to discuss the fracture on the middle phalanx of the left small toe.  She is overall doing well.  She is no longer buddy taping.  Since the injury happened, she does feel as though she has been having more right ankle pain.  Is requesting an x-ray of that.  No injuries that she remembers.  She does have a history of chronic ankle sprains on the right ankle.    PMH, Medications and Allergies were reviewed and updated as needed.    ROS:  As noted above otherwise negative.    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     Corneal opacity     Hypothyroidism     Osteopenia, unspecified location     Aromatase inhibitor use     Chronic pain of right knee     DDD (degenerative disc disease), lumbar     Degenerative scoliosis in adult patient     Peripheral neuropathy     Spinal stenosis of lumbar region with neurogenic claudication     Spondylolisthesis of lumbar region     Brain lesion     Primary osteoarthritis of left ankle     Pain in joint involving ankle and foot, left     Dermatochalasis of both upper eyelids       Current Outpatient Medications   Medication Sig Dispense Refill     acetaminophen (TYLENOL) 500 MG tablet Take 500-1,000 mg by mouth every 6 hours as needed for mild pain       amLODIPine (NORVASC) 10 MG tablet Take 1 tablet (10 mg) by mouth daily (Patient taking differently: Take 10 mg by mouth daily Takes around 10 AM.) 90 tablet 3     Ascorbic Acid (VITAMIN C) 500 MG CHEW Take 1 tablet by mouth daily       atorvastatin (LIPITOR) 80 MG tablet Take 1 tablet (80 mg) by mouth daily (Patient taking differently: Take 80 mg by mouth every evening) 90 tablet 3     Cholecalciferol (VITAMIN D3) 50 MCG (2000 UT) CAPS Take 6,000 Units by mouth daily (Patient taking differently: Take 6,000 Units by mouth daily (with lunch)) 270 capsule 3     CRANBERRY EXTRACT PO Take 650 mg by mouth daily ~10 am  Takes 1       diclofenac (VOLTAREN) 1 % topical gel APPLY 4 GRAMS TO KNEES OR 2 GRAMS TO HANDS FOUR TIMES DAILY USING ENCLOSED DOSING CARD. (Patient taking differently: Apply 4 grams to knees or 2 grams to hands four times daily as needed using enclosed dosing card.) 100 g 3     gabapentin (NEURONTIN) 300 MG capsule Take 3 capsules (900 mg) by mouth 3 times daily 270 capsule 11     HYDROcodone-acetaminophen (NORCO) 5-325 MG tablet Take 1 tablet by mouth every 6 hours as needed for severe pain Uses about 10 tablets per month. 30 tablet 0      "hydrOXYzine (ATARAX) 10 MG tablet Take 1 tablet (10 mg) by mouth 2 times daily as needed for itching (pain adjuvant) 30 tablet 0     ibuprofen (ADVIL/MOTRIN) 600 MG tablet Take 1 tablet (600 mg) by mouth every 6 hours as needed for other (mild and/or inflammatory pain) 30 tablet 0     levothyroxine (SYNTHROID/LEVOTHROID) 112 MCG tablet TAKE 1/2 TABLET BY MOUTH DAILY 45 tablet 3     lisinopril (ZESTRIL) 40 MG tablet Take 1 tablet (40 mg) by mouth daily 90 tablet 3     melatonin 5 MG tablet Take 10 mg by mouth At Bedtime        methocarbamol (ROBAXIN) 500 MG tablet Take 1 tablet (500 mg) by mouth 3 times daily 40 tablet 0     metoprolol succinate ER (TOPROL-XL) 50 MG 24 hr tablet Take 1 tablet (50 mg) by mouth daily (Patient taking differently: Take 50 mg by mouth every morning) 90 tablet 3     Multiple Vitamin (MULTI-VITAMIN) per tablet Take 1 tablet by mouth daily       ondansetron (ZOFRAN-ODT) 4 MG ODT tab Take 1 tablet (4 mg) by mouth every 12 hours as needed for nausea 180 tablet 3     senna-docusate (SENOKOT-S/PERICOLACE) 8.6-50 MG tablet Take 2 tablets by mouth 2 times daily (Patient taking differently: Take 1-2 tablets by mouth 2 times daily as needed) 30 tablet 0     spironolactone (ALDACTONE) 25 MG tablet Take 2 tablets (50 mg) by mouth daily 90 tablet 3     triamcinolone (KENALOG) 0.1 % external ointment Apply topically 2 times daily (Patient taking differently: Apply topically 2 times daily as needed) 30 g 11     Vaginal Lubricant (REPHRESH) GEL Place 2 g vaginally every 3 days 2 g 3            OBJECTIVE:       Vitals:   Vitals:    04/29/22 1300   Weight: 73.9 kg (163 lb)   Height: 1.702 m (5' 7\")     BMI: Body mass index is 25.53 kg/m .    Gen:  Well nourished and in no acute distress  HEENT: Extraocular movement intact  Neck: Supple  Pulm:  Breathing Comfortably. No increased respiratory effort.  Psych: Euthymic. Appropriately answers questions    MSK: Left foot without noticeable tenderness.  No " ecchymosis or edema.  No tenderness to palpation throughout the ankle or heel.  Negative syndesmotic testing.  Negative anterior drawer and talar tilt.    Right ankle without effusion or erythema.  Mild tenderness to palpation along the medial aspect posterior to the medial malleolus in the area of the posterior tibial tendon.  Full range of motion.  Strength diminished in dorsiflexion.      XRAY : Ongoing healing of the middle phalanx of the fracture noted in that area.  Will await official radiologic read.          ASSESSMENT and PLAN:     Nadira was seen today for pain and pain.    Diagnoses and all orders for this visit:    Left foot pain    Sprain of anterior talofibular ligament of left ankle, sequela  -     Physical Therapy Referral; Future    Sprain of deltoid ligament of right ankle, sequela  -     Physical Therapy Referral; Future    Osteochondral defect of ankle  -     Physical Therapy Referral; Future      71-year-old female presenting to clinic today for follow-up of the middle phalanx fifth digit of the foot fracture.  She is 3 weeks immobilized is not having any pain.  She is ambulating well as well.  She is coming to clinic to discuss right ankle pain, which is without instability or any particular injury.  Overall it seems as though she has been compensating due to the fracture of the left foot which is led to more right ankle pain.  Overall, for the left ankle she also has an OCD lesion which she has been seen by foot and ankle orthopedics and no indication for surgery or corrective measures was discussed, per the patient.  At this time, in joint decision-making with the patient, we have elected to place her in physical therapy for the ankle concerns.  I do think ongoing strengthening may help her with her overall functionality.    She can return to clinic as needed at this point.    Options for treatment and/or follow-up care were reviewed with the patient was actively involved in the decision  making process. Patient verbalized understanding and was in agreement with the plan.    Rolan Morgan DO  , Sports Medicine  Department of Family Medicine and [facility]

## 2022-04-28 DIAGNOSIS — M79.672 LEFT FOOT PAIN: ICD-10-CM

## 2022-04-28 DIAGNOSIS — M25.571 RIGHT ANKLE PAIN: Primary | ICD-10-CM

## 2022-04-29 ENCOUNTER — MYC REFILL (OUTPATIENT)
Dept: INTERNAL MEDICINE | Facility: CLINIC | Age: 72
End: 2022-04-29

## 2022-04-29 ENCOUNTER — OFFICE VISIT (OUTPATIENT)
Dept: ORTHOPEDICS | Facility: CLINIC | Age: 72
End: 2022-04-29
Payer: COMMERCIAL

## 2022-04-29 ENCOUNTER — MYC MEDICAL ADVICE (OUTPATIENT)
Dept: OPHTHALMOLOGY | Facility: CLINIC | Age: 72
End: 2022-04-29

## 2022-04-29 ENCOUNTER — MYC REFILL (OUTPATIENT)
Dept: ORTHOPEDICS | Facility: CLINIC | Age: 72
End: 2022-04-29
Payer: COMMERCIAL

## 2022-04-29 VITALS — BODY MASS INDEX: 25.58 KG/M2 | WEIGHT: 163 LBS | HEIGHT: 67 IN

## 2022-04-29 DIAGNOSIS — M48.062 SPINAL STENOSIS OF LUMBAR REGION WITH NEUROGENIC CLAUDICATION: ICD-10-CM

## 2022-04-29 DIAGNOSIS — M51.369 DDD (DEGENERATIVE DISC DISEASE), LUMBAR: ICD-10-CM

## 2022-04-29 DIAGNOSIS — M79.672 LEFT FOOT PAIN: Primary | ICD-10-CM

## 2022-04-29 DIAGNOSIS — M95.8 OSTEOCHONDRAL DEFECT OF ANKLE: ICD-10-CM

## 2022-04-29 DIAGNOSIS — S93.492S SPRAIN OF ANTERIOR TALOFIBULAR LIGAMENT OF LEFT ANKLE, SEQUELA: ICD-10-CM

## 2022-04-29 DIAGNOSIS — S93.421S SPRAIN OF DELTOID LIGAMENT OF RIGHT ANKLE, SEQUELA: ICD-10-CM

## 2022-04-29 PROCEDURE — 99213 OFFICE O/P EST LOW 20 MIN: CPT | Performed by: STUDENT IN AN ORGANIZED HEALTH CARE EDUCATION/TRAINING PROGRAM

## 2022-04-29 RX ORDER — HYDROCODONE BITARTRATE AND ACETAMINOPHEN 5; 325 MG/1; MG/1
1 TABLET ORAL EVERY 6 HOURS PRN
Qty: 30 TABLET | Refills: 0 | Status: SHIPPED | OUTPATIENT
Start: 2022-05-13 | End: 2022-06-20

## 2022-04-29 RX ORDER — METHOCARBAMOL 500 MG/1
500 TABLET, FILM COATED ORAL 3 TIMES DAILY
Qty: 40 TABLET | Refills: 0 | Status: SHIPPED | OUTPATIENT
Start: 2022-04-29 | End: 2022-10-27

## 2022-04-29 NOTE — LETTER
4/29/2022      RE: Nadira Perez  [address]  [place] [address]     Dear Colleague,    Thank you for referring your patient, Nadira Perez, to the [facility] SPORTS MEDICINE CLINIC [place]. Please see a copy of my visit note below.    [facility]  Sports Medicine Clinic  Clinics and Surgery Center           SUBJECTIVE       Nadira Perez is a 71 year old female presenting to clinic today with concerns for left amall toe phalanx fracture.    4/7/22: 71-year-old female with a history of osteoporosis, presenting to clinic today with a week's history worth of acute left toe pain after acute injury to the foot.  X-rays were evaluated in detail with the patient.  It does seem to be an evidence of a mildly displaced middle phalanx distal fracture on the medial aspect.  Patient is also very tender there despite her peripheral neuropathy.  Options for management were discussed with the patient.  I do think it is reasonable to buddy tape her at this point.  We have also provided her with a postop shoe for comfort.  Would like to see her again in 3 weeks.  New x-rays at that time.  Likely progress out of the buddy taping and she is back to her normal footwear at that point.    Interval hx: Patient returns to clinic today to discuss the fracture on the middle phalanx of the left small toe.  She is overall doing well.  She is no longer buddy taping.  Since the injury happened, she does feel as though she has been having more right ankle pain.  Is requesting an x-ray of that.  No injuries that she remembers.  She does have a history of chronic ankle sprains on the right ankle.    PMH, Medications and Allergies were reviewed and updated as needed.    ROS:  As noted above otherwise negative.    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     Corneal opacity     Hypothyroidism     Osteopenia, unspecified location     Aromatase inhibitor use     Chronic pain of right knee     DDD (degenerative disc disease), lumbar     Degenerative scoliosis in adult patient     Peripheral neuropathy     Spinal stenosis of lumbar region with neurogenic claudication     Spondylolisthesis of lumbar region     Brain lesion     Primary osteoarthritis of left ankle     Pain in joint involving ankle and foot, left     Dermatochalasis of both upper eyelids       Current Outpatient Medications   Medication Sig Dispense Refill     acetaminophen (TYLENOL) 500 MG tablet Take 500-1,000 mg by mouth every 6 hours as needed for mild pain       amLODIPine (NORVASC) 10 MG tablet Take 1 tablet (10 mg) by mouth daily (Patient taking differently: Take 10 mg by mouth daily Takes around 10 AM.) 90 tablet 3     Ascorbic Acid (VITAMIN C) 500 MG CHEW Take 1 tablet by mouth daily       atorvastatin (LIPITOR) 80 MG tablet Take 1 tablet (80 mg) by mouth daily (Patient taking differently: Take 80 mg by mouth every evening) 90 tablet 3     Cholecalciferol (VITAMIN D3) 50 MCG (2000 UT) CAPS Take 6,000 Units by mouth daily (Patient taking differently: Take 6,000 Units by mouth daily (with lunch)) 270 capsule 3     CRANBERRY EXTRACT PO Take 650 mg by mouth daily ~10 am  Takes 1       diclofenac (VOLTAREN) 1 % topical gel APPLY 4 GRAMS TO KNEES OR 2 GRAMS TO HANDS FOUR TIMES DAILY USING ENCLOSED DOSING CARD. (Patient taking differently: Apply 4 grams to knees or 2 grams to hands four times daily as needed using enclosed dosing card.)  "100 g 3     gabapentin (NEURONTIN) 300 MG capsule Take 3 capsules (900 mg) by mouth 3 times daily 270 capsule 11     HYDROcodone-acetaminophen (NORCO) 5-325 MG tablet Take 1 tablet by mouth every 6 hours as needed for severe pain Uses about 10 tablets per month. 30 tablet 0     hydrOXYzine (ATARAX) 10 MG tablet Take 1 tablet (10 mg) by mouth 2 times daily as needed for itching (pain adjuvant) 30 tablet 0     ibuprofen (ADVIL/MOTRIN) 600 MG tablet Take 1 tablet (600 mg) by mouth every 6 hours as needed for other (mild and/or inflammatory pain) 30 tablet 0     levothyroxine (SYNTHROID/LEVOTHROID) 112 MCG tablet TAKE 1/2 TABLET BY MOUTH DAILY 45 tablet 3     lisinopril (ZESTRIL) 40 MG tablet Take 1 tablet (40 mg) by mouth daily 90 tablet 3     melatonin 5 MG tablet Take 10 mg by mouth At Bedtime        methocarbamol (ROBAXIN) 500 MG tablet Take 1 tablet (500 mg) by mouth 3 times daily 40 tablet 0     metoprolol succinate ER (TOPROL-XL) 50 MG 24 hr tablet Take 1 tablet (50 mg) by mouth daily (Patient taking differently: Take 50 mg by mouth every morning) 90 tablet 3     Multiple Vitamin (MULTI-VITAMIN) per tablet Take 1 tablet by mouth daily       ondansetron (ZOFRAN-ODT) 4 MG ODT tab Take 1 tablet (4 mg) by mouth every 12 hours as needed for nausea 180 tablet 3     senna-docusate (SENOKOT-S/PERICOLACE) 8.6-50 MG tablet Take 2 tablets by mouth 2 times daily (Patient taking differently: Take 1-2 tablets by mouth 2 times daily as needed) 30 tablet 0     spironolactone (ALDACTONE) 25 MG tablet Take 2 tablets (50 mg) by mouth daily 90 tablet 3     triamcinolone (KENALOG) 0.1 % external ointment Apply topically 2 times daily (Patient taking differently: Apply topically 2 times daily as needed) 30 g 11     Vaginal Lubricant (REPHRESH) GEL Place 2 g vaginally every 3 days 2 g 3            OBJECTIVE:       Vitals:   Vitals:    04/29/22 1300   Weight: 73.9 kg (163 lb)   Height: 1.702 m (5' 7\")     BMI: Body mass index is 25.53 " kg/m .    Gen:  Well nourished and in no acute distress  HEENT: Extraocular movement intact  Neck: Supple  Pulm:  Breathing Comfortably. No increased respiratory effort.  Psych: Euthymic. Appropriately answers questions    MSK: Left foot without noticeable tenderness.  No ecchymosis or edema.  No tenderness to palpation throughout the ankle or heel.  Negative syndesmotic testing.  Negative anterior drawer and talar tilt.    Right ankle without effusion or erythema.  Mild tenderness to palpation along the medial aspect posterior to the medial malleolus in the area of the posterior tibial tendon.  Full range of motion.  Strength diminished in dorsiflexion.      XRAY : Ongoing healing of the middle phalanx of the fracture noted in that area.  Will await official radiologic read.          ASSESSMENT and PLAN:     Nadira was seen today for pain and pain.    Diagnoses and all orders for this visit:    Left foot pain    Sprain of anterior talofibular ligament of left ankle, sequela  -     Physical Therapy Referral; Future    Sprain of deltoid ligament of right ankle, sequela  -     Physical Therapy Referral; Future    Osteochondral defect of ankle  -     Physical Therapy Referral; Future      71-year-old female presenting to clinic today for follow-up of the middle phalanx fifth digit of the foot fracture.  She is 3 weeks immobilized is not having any pain.  She is ambulating well as well.  She is coming to clinic to discuss right ankle pain, which is without instability or any particular injury.  Overall it seems as though she has been compensating due to the fracture of the left foot which is led to more right ankle pain.  Overall, for the left ankle she also has an OCD lesion which she has been seen by foot and ankle orthopedics and no indication for surgery or corrective measures was discussed, per the patient.  At this time, in joint decision-making with the patient, we have elected to place her in physical therapy  for the ankle concerns.  I do think ongoing strengthening may help her with her overall functionality.    She can return to clinic as needed at this point.    Options for treatment and/or follow-up care were reviewed with the patient was actively involved in the decision making process. Patient verbalized understanding and was in agreement with the plan.    Rolan Morgan DO  , Sports Medicine  Department of Family Medicine and [facility]

## 2022-05-01 PROBLEM — M19.072 PRIMARY OSTEOARTHRITIS OF LEFT ANKLE: Status: RESOLVED | Noted: 2022-03-29 | Resolved: 2022-05-01

## 2022-05-01 PROBLEM — M25.572 PAIN IN JOINT INVOLVING ANKLE AND FOOT, LEFT: Status: RESOLVED | Noted: 2022-03-29 | Resolved: 2022-05-01

## 2022-05-02 ASSESSMENT — ENCOUNTER SYMPTOMS
EYE PAIN: 0
SLEEP DISTURBANCES DUE TO BREATHING: 0
TREMORS: 0
MEMORY LOSS: 0
PARALYSIS: 0
SINUS CONGESTION: 0
SORE THROAT: 0
FLANK PAIN: 0
EYE REDNESS: 0
JOINT SWELLING: 1
HEADACHES: 0
SYNCOPE: 0
SPEECH CHANGE: 0
MUSCLE CRAMPS: 0
PALPITATIONS: 1
EYE IRRITATION: 0
TASTE DISTURBANCE: 0
HOARSE VOICE: 0
EYE WATERING: 0
EXERCISE INTOLERANCE: 1
HOT FLASHES: 0
SHORTNESS OF BREATH: 0
DIFFICULTY URINATING: 1
SINUS PAIN: 0
WEAKNESS: 1
HYPOTENSION: 0
DYSPNEA ON EXERTION: 0
HEMOPTYSIS: 0
DEPRESSION: 0
SLEEP DISTURBANCES DUE TO BREATHING: 0
HYPOTENSION: 0
DIZZINESS: 1
LOSS OF CONSCIOUSNESS: 0
PANIC: 0
DECREASED LIBIDO: 1
LIGHT-HEADEDNESS: 1
DISTURBANCES IN COORDINATION: 0
PANIC: 0
EYE REDNESS: 0
MEMORY LOSS: 0
MUSCLE CRAMPS: 0
NECK PAIN: 1
MYALGIAS: 1
TASTE DISTURBANCE: 0
WHEEZING: 0
HYPERTENSION: 1
SINUS CONGESTION: 0
SYNCOPE: 0
ARTHRALGIAS: 1
TREMORS: 0
HOARSE VOICE: 0
EYE WATERING: 0
TROUBLE SWALLOWING: 0
NUMBNESS: 1
LOSS OF CONSCIOUSNESS: 0
SPEECH CHANGE: 0
DYSURIA: 0
BACK PAIN: 1
COUGH: 0
DECREASED CONCENTRATION: 0
STIFFNESS: 1
DOUBLE VISION: 0
LIGHT-HEADEDNESS: 1
TINGLING: 1
HEMOPTYSIS: 0
HYPERTENSION: 1
SPUTUM PRODUCTION: 0
SMELL DISTURBANCE: 0
WEAKNESS: 1
DECREASED LIBIDO: 1
BACK PAIN: 1
HEADACHES: 0
DIFFICULTY URINATING: 1
EXERCISE INTOLERANCE: 1
DISTURBANCES IN COORDINATION: 0
COUGH DISTURBING SLEEP: 0
DOUBLE VISION: 0
ARTHRALGIAS: 1
COUGH: 0
HEMATURIA: 0
POSTURAL DYSPNEA: 0
SEIZURES: 0
PARALYSIS: 0
LEG PAIN: 0
DYSPNEA ON EXERTION: 0
NECK PAIN: 1
WHEEZING: 0
EYE IRRITATION: 0
SEIZURES: 0
COUGH DISTURBING SLEEP: 0
NUMBNESS: 1
LEG PAIN: 0
DIZZINESS: 1
JOINT SWELLING: 1
MYALGIAS: 1
SMELL DISTURBANCE: 0
SPUTUM PRODUCTION: 0
SNORES LOUDLY: 1
PALPITATIONS: 1
STIFFNESS: 1
TROUBLE SWALLOWING: 0
DYSURIA: 0
NERVOUS/ANXIOUS: 0
NERVOUS/ANXIOUS: 0
SHORTNESS OF BREATH: 0
FLANK PAIN: 0
HOT FLASHES: 0
DECREASED CONCENTRATION: 0
INSOMNIA: 1
HEMATURIA: 0
TINGLING: 1
INSOMNIA: 1
POSTURAL DYSPNEA: 0
SINUS PAIN: 0
SORE THROAT: 0
NECK MASS: 0
ORTHOPNEA: 0
EYE PAIN: 0
DEPRESSION: 0
ORTHOPNEA: 0
NECK MASS: 0
SNORES LOUDLY: 1

## 2022-05-02 ASSESSMENT — ACTIVITIES OF DAILY LIVING (ADL)
IN_THE_PAST_7_DAYS,_DID_YOU_NEED_HELP_FROM_OTHERS_TO_PERFORM_EVERYDAY_ACTIVITIES_SUCH_AS_EATING,_GETTING_DRESSED,_GROOMING,_BATHING,_WALKING,_OR_USING_THE_TOILET: N
IN_THE_PAST_7_DAYS,_DID_YOU_NEED_HELP_FROM_OTHERS_TO_TAKE_CARE_OF_THINGS_SUCH_AS_LAUNDRY_AND_HOUSEKEEPING,_BANKING,_SHOPPING,_USING_THE_TELEPHONE,_FOOD_PREPARATION,_TRANSPORTATION,_OR_TAKING_YOUR_OWN_MEDICATIONS?: Y

## 2022-05-03 ENCOUNTER — ANCILLARY PROCEDURE (OUTPATIENT)
Dept: ULTRASOUND IMAGING | Facility: CLINIC | Age: 72
End: 2022-05-03
Attending: NURSE PRACTITIONER
Payer: COMMERCIAL

## 2022-05-03 ENCOUNTER — OFFICE VISIT (OUTPATIENT)
Dept: INTERNAL MEDICINE | Facility: CLINIC | Age: 72
End: 2022-05-03
Payer: COMMERCIAL

## 2022-05-03 ENCOUNTER — LAB (OUTPATIENT)
Dept: LAB | Facility: CLINIC | Age: 72
End: 2022-05-03

## 2022-05-03 ENCOUNTER — OFFICE VISIT (OUTPATIENT)
Dept: ORTHOPEDICS | Facility: CLINIC | Age: 72
End: 2022-05-03
Payer: OTHER MISCELLANEOUS

## 2022-05-03 ENCOUNTER — TELEPHONE (OUTPATIENT)
Dept: ORTHOPEDICS | Facility: CLINIC | Age: 72
End: 2022-05-03

## 2022-05-03 ENCOUNTER — OFFICE VISIT (OUTPATIENT)
Dept: ORTHOPEDICS | Facility: CLINIC | Age: 72
End: 2022-05-03
Payer: COMMERCIAL

## 2022-05-03 VITALS
DIASTOLIC BLOOD PRESSURE: 86 MMHG | BODY MASS INDEX: 26.06 KG/M2 | OXYGEN SATURATION: 98 % | TEMPERATURE: 98.8 F | SYSTOLIC BLOOD PRESSURE: 134 MMHG | HEART RATE: 75 BPM | HEIGHT: 67 IN | WEIGHT: 166 LBS

## 2022-05-03 VITALS
BODY MASS INDEX: 26.06 KG/M2 | WEIGHT: 166 LBS | DIASTOLIC BLOOD PRESSURE: 86 MMHG | HEIGHT: 67 IN | SYSTOLIC BLOOD PRESSURE: 134 MMHG | HEART RATE: 74 BPM

## 2022-05-03 DIAGNOSIS — G47.20 SLEEP PATTERN DISTURBANCE: Primary | ICD-10-CM

## 2022-05-03 DIAGNOSIS — M54.16 LUMBAR RADICULOPATHY: Primary | ICD-10-CM

## 2022-05-03 DIAGNOSIS — N28.89 RENAL MASS, LEFT: ICD-10-CM

## 2022-05-03 DIAGNOSIS — G56.03 BILATERAL CARPAL TUNNEL SYNDROME: Primary | ICD-10-CM

## 2022-05-03 DIAGNOSIS — Z98.1 S/P SPINAL FUSION: ICD-10-CM

## 2022-05-03 DIAGNOSIS — M19.031 ARTHRITIS OF BOTH WRISTS: ICD-10-CM

## 2022-05-03 DIAGNOSIS — Z11.59 ENCOUNTER FOR SCREENING FOR OTHER VIRAL DISEASES: ICD-10-CM

## 2022-05-03 DIAGNOSIS — M19.032 ARTHRITIS OF BOTH WRISTS: ICD-10-CM

## 2022-05-03 LAB — SARS-COV-2 RNA RESP QL NAA+PROBE: NEGATIVE

## 2022-05-03 PROCEDURE — 99214 OFFICE O/P EST MOD 30 MIN: CPT | Performed by: ORTHOPAEDIC SURGERY

## 2022-05-03 PROCEDURE — 76770 US EXAM ABDO BACK WALL COMP: CPT | Mod: GC | Performed by: RADIOLOGY

## 2022-05-03 PROCEDURE — 99215 OFFICE O/P EST HI 40 MIN: CPT | Performed by: NURSE PRACTITIONER

## 2022-05-03 PROCEDURE — U0003 INFECTIOUS AGENT DETECTION BY NUCLEIC ACID (DNA OR RNA); SEVERE ACUTE RESPIRATORY SYNDROME CORONAVIRUS 2 (SARS-COV-2) (CORONAVIRUS DISEASE [COVID-19]), AMPLIFIED PROBE TECHNIQUE, MAKING USE OF HIGH THROUGHPUT TECHNOLOGIES AS DESCRIBED BY CMS-2020-01-R: HCPCS | Performed by: OPHTHALMOLOGY

## 2022-05-03 PROCEDURE — 20605 DRAIN/INJ JOINT/BURSA W/O US: CPT | Mod: 50 | Performed by: STUDENT IN AN ORGANIZED HEALTH CARE EDUCATION/TRAINING PROGRAM

## 2022-05-03 RX ORDER — LIDOCAINE HYDROCHLORIDE 10 MG/ML
1 INJECTION, SOLUTION INFILTRATION; PERINEURAL
Status: DISCONTINUED | OUTPATIENT
Start: 2022-05-03 | End: 2023-09-12

## 2022-05-03 RX ORDER — HYDROXYZINE PAMOATE 25 MG/1
25 CAPSULE ORAL AT BEDTIME
Qty: 30 CAPSULE | Refills: 0 | Status: SHIPPED | OUTPATIENT
Start: 2022-05-03 | End: 2022-05-31

## 2022-05-03 RX ORDER — TRIAMCINOLONE ACETONIDE 40 MG/ML
40 INJECTION, SUSPENSION INTRA-ARTICULAR; INTRAMUSCULAR
Status: DISCONTINUED | OUTPATIENT
Start: 2022-05-03 | End: 2023-04-28

## 2022-05-03 RX ADMIN — LIDOCAINE HYDROCHLORIDE 1 ML: 10 INJECTION, SOLUTION INFILTRATION; PERINEURAL at 16:13

## 2022-05-03 RX ADMIN — TRIAMCINOLONE ACETONIDE 40 MG: 40 INJECTION, SUSPENSION INTRA-ARTICULAR; INTRAMUSCULAR at 16:13

## 2022-05-03 ASSESSMENT — PAIN SCALES - GENERAL: PAINLEVEL: MODERATE PAIN (5)

## 2022-05-03 NOTE — TELEPHONE ENCOUNTER
I answered a call from our [facility] radiology team regarding left foot XR that were obtaine don 4/29/22. Radiology wanted to make sure we knew about the comminuted intra-articular minimally displaced fracture of the left  proximal fifth phalanx. Documented within the patient's chart and message was sent to ordering provider, Dr. Morgan. Gia Dewitt ATC on 5/3/2022 at 1:50 PM

## 2022-05-03 NOTE — PATIENT INSTRUCTIONS
Thank you for choosing [facility]    Dr. Conley Locations:    [facility] Clinics & Surgery Center [address], [address]  [address] [address]   Appointments: [phone] Appointments: [phone]   Fax: [phone] Fax: [phone]     Follow up: as needed

## 2022-05-03 NOTE — LETTER
5/3/2022         RE: Nadira Perez  [address]  [place] [address]        Dear Colleague,    Thank you for referring your patient, Nadira Perez, to the [facility] ORTHOPEDIC CL[facility] [place]. Please see a copy of my visit note below.    Orthopaedic Surgery Hand Clinic Progress Note    Patient Name: Nadira Perez  MRN: [id number]  : 1950  Date: 22      Date of Surgery: 21    Surgery Performed: 1) Open right carpal tunnel release  2) Removal of buried implant (deep) right wrist    Subjective:  Ms. Nadira Perez is a 71 year old female who returns today for bilateral radiocarpal injections.    Overall doing well. Right wrist continues to bother her, some days more than her back. Using wrist braces intermittently. Today 510 on the right, left 3/10.    Objective:  There were no vitals taken for this visit.    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 Right s/p hardware removal for second failed 4 corner fusion, and carpal tunnel release.    After obtaining written consent, I cleansed the skin over bilateral wrist joints with chlorhexidine.  I then anesthetized 3 spray, after which I injected 1 cc of Kenalog 40 mg into bilateral radiocarpal joint through the 3-4 interval.  The patient tolerated the procedures well and there were no complications.    Continue brace wear. Patient requests new braces today, bilateral provided. Discussed possibility of injections 3 times a year instead of 2 times a year for symptomatic control, until she decides that she wants to proceed with total wrist fusion on the  right.    Follow-up as needed.    Rolan Conley MD    Hand, Upper Extremity & Microvascular Surgery  Department of Orthopedic Surgery  [facility]    Medium Joint Injection/Arthrocentesis: bilateral radiocarpal    Date/Time: 5/3/2022 4:13 PM  Performed by: Rolan Conley MD  Authorized by: Rolan Conley MD     Indications:  Pain  Needle Size:  25 G  Guidance: surface landmarks    Location:  Wrist  Laterality:  Bilateral  Site:  Bilateral radiocarpal  Medications (Right):  1 mL lidocaine 1 %; 40 mg triamcinolone 40 MG/ML  Medications (Left):  1 mL lidocaine 1 %; 40 mg triamcinolone 40 MG/ML  Outcome:  Tolerated well, no immediate complications  Procedure discussed: discussed risks, benefits, and alternatives    Consent Given by:  Patient  Timeout: timeout called immediately prior to procedure    Prep: patient was prepped and draped in usual sterile fashion              Again, thank you for allowing me to participate in the care of your patient.        Sincerely,        Rolan Conley MD

## 2022-05-03 NOTE — PATIENT INSTRUCTIONS
Hold lisinopril for 24 hrs before surgery.  Hold any aspirin or non-steroidals.  Hold multivitamins until after surgery.

## 2022-05-03 NOTE — LETTER
5/3/2022         RE: Nadira Perez  [id number]  [facility] [id number]        Dear Colleague,    Thank you for referring your patient, Nadira Perez, to the [facility] [place]. Please see a copy of my visit note below.    Spine Surgery Return Clinic Visit      Chief Complaint:   RECHECK (3 month follow up, patient stated that she is having pain when she stands for more than 10 minutes, she believes she is having muscle pain more so, she states that when she stretches )      Interval HPI:  Symptom Profile Including: location of symptoms, onset, severity, exacerbating/alleviating factors, previous treatments:        Nadira Perez is a 71 year old female who returns today for clinical follow-up.  She has some mild back pain.  Nothing down the legs at this time.  We did have some concern over a possible fracture in the perioperative period, but she seems to be recovering quite well, requiring minimal assistance for ambulation.  She does have some back pain, but overall feels like things are improving.            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     Thyroid disease             Past Surgical History:     Past Surgical History:   Procedure Laterality Date     ABDOMEN SURGERY      ovarian cyst, mesh     ARTHRODESIS WRIST Right      ARTHRODESIS WRIST  02/14/2013    Procedure: ARTHRODESIS WRIST;  left wrist scaphoid excision, four bone fusion, iliac crest bone graft  ( Mac with block);  Surgeon: Av Mendez MD;   [facility]     BIOPSY      skin, vaginal     BLEPHAROPLASTY BILATERAL Bilateral 5/6/2022    Procedure: UPPER BLEPHAROPLASTY, BILATERAL;  Surgeon: Jemal Sanchez MD;  Location: [facility] OR     CATARACT IOL, RT/LT Right 03/13/2018     CATARACT IOL, RT/LT Left 02/20/2018     COLONOSCOPY  12/24/2013    Procedure: COMBINED COLONOSCOPY, SINGLE BIOPSY/POLYPECTOMY BY BIOPSY;  COLONOSCOPY;  Surgeon: Dom Alvarez MD;  Location:  GI     COSMETIC BLEPHAROPLASTY LOWER LIDS BILATERAL Bilateral 5/6/2022    Procedure: BLEPHAROPLASTY, LOWER EYELID, BILATERAL, COSMETIC;  Surgeon: Jemal Sanchez MD;  [place] [facility] [place]     ESOPHAGOSCOPY, GASTROSCOPY, DUODENOSCOPY (EGD), COMBINED N/A 11/23/2016    Procedure: COMBINED ESOPHAGOSCOPY, GASTROSCOPY, DUODENOSCOPY (EGD);  Surgeon: Quinten Feliciano MD;  Location:  GI     EXTERNAL EAR SURGERY      right     EYE SURGERY      radial keratomy     FUSION, SPINE, INTERBODY, OBLIQUE ANTERIOR AND LUMBAR, 2 LEVELS, POST APPROACH, USING OTS N/A 11/18/2021    Procedure: Part 1: Oblique Anterior Interbody Fusion at Lumbar 5 to sacral 1 with use of Bone Morphogenic Protein,;  Surgeon: Serge Ramirez MD;  Location: [facility]     GRAFT BONE FROM ILIAC CREST  02/14/2013    Procedure: GRAFT BONE FROM ILIAC CREST;  mac with block and local infilitration;  Surgeon: Av Mendez MD;  [facility]     HC BREATH HYDROGEN TEST N/A 10/14/2016    Procedure: HYDROGEN BREATH TEST;  Surgeon: Cheri Barron MD;  Location:  GI     HERNIA REPAIR      umbilical age 18 mos.     HYSTERECTOMY TOTAL ABDOMINAL  05/03/2000     MASTECTOMY MODIFIED RADICAL Bilateral     bilateral; right breast prophylactic     OPTICAL TRACKING SYSTEM FUSION POSTERIOR SPINE LUMBAR N/A 11/18/2021    Procedure: Open Posterior Instrumented Spinal Fusion at Lumbar 5 to Sacral 1, with grimm aguayo osteotomy, use of O-Arm/Stealth;  Surgeon: Serge Ramirez MD;  Location: [facility]      PARATHYROIDECTOMY  09/23/2004    R SUPERIOR     PARATHYROIDECTOMY  09/23/2004    parathyroid resection, subtotal     RELEASE CARPAL TUNNEL Right 12/2/2021    Procedure: RIGHT CARPAL TUNNEL RELEASE, RIGHT WRIST HARDWARE REMOVAL, RIGHT CARPAL BOSS EXCISION;  Surgeon: Rolan Conley MD;  Location: [facility] OR     REMOVE HARDWARE HAND  09/24/2013    Procedure: REMOVE HARDWARE HAND;  Left Hand Screw Removal        RHINOPLASTY  1968     thyr proc skin closed cosmetic manner by subcuticular stitch  01/23/2009     THYROPLASTY  10/09/2009     TONSILLECTOMY  1977     WRIST SURGERY      wrist arthrodesis            Social History:     Social History     Tobacco Use     Smoking status: Never Smoker     Smokeless tobacco: Never Used   Substance Use Topics     Alcohol use: Not Currently     Alcohol/week: 7.0 standard drinks     Types: 7 Glasses of wine per week     Comment: Rare to occasional            Family History:     Family History   Problem Relation Age of Onset     Neurologic Disorder Mother         Anuerysm of Cerebral Artery, Dementia     Diabetes Mother      Thyroid Disease Mother         ,     Cerebrovascular Disease Mother      Dementia Mother      Osteoporosis Mother      Heart Disease Father         AAA     Hypertension Father      Circulatory Brother         Perihperal Neurophathy     Diabetes Maternal Grandmother      Asthma Maternal Grandmother      Chronic Obstructive Pulmonary Disease Maternal Grandfather         father     Asthma Maternal Grandfather      Diabetes Maternal Aunt         x2     Melanoma Maternal Aunt      Glaucoma Maternal Aunt      Breast Cancer Cousin      Dementia Other      Cancer Other         malignant melanoma     Hypertension Other      Hypertension Other      Cerebrovascular Disease Other      Cerebrovascular Disease Other      Obesity Other      Respiratory Other      Cancer Other      Diabetes Other      Asthma Other      Macular Degeneration No family hx of      Coronary Artery Disease No  family hx of      Hyperlipidemia No family hx of      Kidney Disease No family hx of      Thrombosis No family hx of      Arthritis No family hx of      Depression No family hx of      Mental Illness No family hx of      Substance Abuse No family hx of      Cystic Fibrosis No family hx of      Early Death No family hx of      Coronary Artery Disease Early Onset No family hx of      Heart Failure No family hx of      Bleeding Diathesis No family hx of      Ovarian Cancer No family hx of      Uterine Cancer No family hx of      Prostate Cancer No family hx of      Colorectal Cancer No family hx of      Pancreatic Cancer No family hx of      Lung Cancer No family hx of      Other Cancer No family hx of      Autoimmune Disease No family hx of      Unknown/Adopted No family hx of      Genetic Disorder No family hx of      Bleeding Disorder No family hx of      Clotting Disorder No family hx of      Anesthesia Reaction No family hx of             Allergies:     Allergies   Allergen Reactions     Erythromycin Nausea     Penicillins Hives     Around age 6 - doesn't recall full reaction, mother told her it was hives.     Has tolerated cephalsporins.             Medications:     Current Outpatient Medications   Medication     acetaminophen (TYLENOL) 500 MG tablet     amLODIPine (NORVASC) 10 MG tablet     Ascorbic Acid (VITAMIN C) 500 MG CHEW     atorvastatin (LIPITOR) 80 MG tablet     bacitracin 500 UNIT/GM ophthalmic ointment     Cholecalciferol (VITAMIN D3) 50 MCG (2000 UT) CAPS     CRANBERRY EXTRACT PO     diclofenac (VOLTAREN) 1 % topical gel     erythromycin (ROMYCIN) 5 MG/GM ophthalmic ointment     gabapentin (NEURONTIN) 300 MG capsule     [START ON 5/13/2022] HYDROcodone-acetaminophen (NORCO) 5-325 MG tablet     hydrOXYzine (ATARAX) 10 MG tablet     hydrOXYzine (VISTARIL) 25 MG capsule     ibuprofen (ADVIL/MOTRIN) 600 MG tablet     levothyroxine (SYNTHROID/LEVOTHROID) 112 MCG tablet     lisinopril (ZESTRIL) 40 MG  tablet     melatonin 5 MG tablet     methocarbamol (ROBAXIN) 500 MG tablet     metoprolol succinate ER (TOPROL-XL) 50 MG 24 hr tablet     Multiple Vitamin (MULTI-VITAMIN) per tablet     naloxone (NARCAN) 4 MG/0.1ML nasal spray     neomycin-polymixin-dexamethasone (MAXITROL) 0.1 % ophthalmic suspension     neomycin-polymyxin-dexamethasone (MAXITROL) 3.5-48668-2.1 SUSP ophthalmic susp     ondansetron (ZOFRAN-ODT) 4 MG ODT tab     senna-docusate (SENOKOT-S/PERICOLACE) 8.6-50 MG tablet     spironolactone (ALDACTONE) 25 MG tablet     triamcinolone (KENALOG) 0.1 % external ointment     Vaginal Lubricant (REPHRESH) GEL     Current Facility-Administered Medications   Medication     lidocaine 1 % injection 1 mL     lidocaine 1 % injection 1 mL     triamcinolone (KENALOG-40) injection 40 mg     triamcinolone (KENALOG-40) injection 40 mg             Review of Systems:   A focused musculoskeletal and neurologic ROS was performed with pertinent positives and negatives noted in the HPI.  Additional systems were also reviewed and are documented at the bottom of the note.         Physical Exam:   Vitals: There were no vitals taken for this visit.  Musculoskeletal, Neurologic, and Spine:          Lumbar Spine:    Appearance - No gross stepoffs or deformities    Motor -     L2-3: Hip flexion 5/5 R and 5/5 L strength          L3/4:  Knee extension R 5/5 and L 5/5 strength         L4/5:  Foot dorsiflexion R 5/5 L 5/5 and               S1:  Plantarflexion/Peroneal Muscles  R 5/5 and L 5/5 strength    Sensation: intact to light touch L3-S1 distribution BLE              Imaging:   We ordered and independently reviewed new radiographs at this clinic visit. The results were discussed with the patient. Findings include:     Radiographs show stable position of the implants       Assessment and Plan:     71 year old female with improving recovery after L5-S1 interbody fusion.  She should have a CT scan at the 1 year follow-up.  May continue to  advance with unlimited low impact cardio.  Last follow-up will be with physician assistant and I encourage patient to otherwise reach out to me with any questions or concerns           Respectfully,  Serge Ramirez MD  Spine Surgery  [facility]    Answers for HPI/ROS submitted by the patient on 5/2/2022  General Symptoms: No  Skin Symptoms: No  HENT Symptoms: Yes  EYE SYMPTOMS: Yes  HEART SYMPTOMS: Yes  LUNG SYMPTOMS: Yes  INTESTINAL SYMPTOMS: No  URINARY SYMPTOMS: Yes  GYNECOLOGIC SYMPTOMS: Yes  BREAST SYMPTOMS: No  SKELETAL SYMPTOMS: Yes  BLOOD SYMPTOMS: No  NERVOUS SYSTEM SYMPTOMS: Yes  MENTAL HEALTH SYMPTOMS: Yes  Ear pain: No  Ear discharge: No  Hearing loss: Yes  Tinnitus: No  Nosebleeds: No  Congestion: No  Sinus pain: No  Trouble swallowing: No   Voice hoarseness: No  Mouth sores: Yes  Sore throat: No  Tooth pain: No  Gum tenderness: No  Bleeding gums: No  Change in taste: No  Change in sense of smell: No  Dry mouth: Yes  Hearing aid used: Yes  Neck lump: No  Eye pain: No  Vision loss: No  Dry eyes: Yes  Watery eyes: No  Eye bulging: No  Double vision: No  Flashing of lights: No  Spots: No  Floaters: Yes  Redness: No  Crossed eyes: No  Tunnel Vision: No  Yellowing of eyes: No  Eye irritation: No  Chest pain or pressure: No  Fast or irregular heartbeat: Yes  Pain in legs with walking: No  Trouble breathing while lying down: No  Fingers or toes appear blue: No  High blood pressure: Yes  Low blood pressure: No  Fainting: No  Murmurs: Yes  Pacemaker: No  Varicose veins: Yes  Edema or swelling: Yes  Wake up at night with shortness of breath: No  Light-headedness: Yes  Exercise intolerance: Yes  Cough: No  Sputum or phlegm: No  Coughing up blood: No  Difficulty breating or shortness of breath: No  Snoring: Yes  Wheezing: No  Difficulty breathing on exertion: No  Nighttime Cough: No  Difficulty breathing when lying flat: No  Trouble holding urine or incontinence: Yes  Pain or burning:  No  Trouble starting or stopping: Yes  Increased frequency of urination: Yes  Blood in urine: No  Decreased frequency of urination: No  Frequent nighttime urination: Yes  Flank pain: No  Difficulty emptying bladder: Yes  Bleeding or spotting between periods: No  Heavy or painful periods: No  Irregular periods: No  Vaginal discharge: No  Hot flashes: No  Vaginal dryness: Yes  Genital ulcers: No  Reduced libido: Yes  Painful intercourse: Yes  Difficulty with sexual arousal: Yes  Post-menopausal bleeding: No  Back pain: Yes  Muscle aches: Yes  Neck pain: Yes  Swollen joints: Yes  Joint pain: Yes  Bone pain: Yes  Muscle cramps: No  Joint stiffness: Yes  Bone fracture: Yes  Trouble with coordination: No  Dizziness or trouble with balance: Yes  Fainting or black-out spells: No  Memory loss: No  Headache: No  Seizures: No  Speech problems: No  Tingling: Yes  Tremor: No  Weakness: Yes  Difficulty walking: Yes  Paralysis: No  Numbness: Yes  Nervous or Anxious: No  Depression: No  Trouble sleeping: Yes  Trouble thinking or concentrating: No  Mood changes: No  Panic attacks: No

## 2022-05-03 NOTE — PROGRESS NOTES
[facility] INTERNAL MEDICINE [address] [address]  Phone: [phone]  Fax: [phone]  Primary Provider: Sukumar Quiñonez  Pre-op Performing Provider: SUKUMAR QUIÑONEZ      PREOPERATIVE EVALUATION:  Today's date: 5/3/2022    Nadira Preez is a 71 year old female who presents for a preoperative evaluation.    Surgical Information:  Surgery/Procedure: bilateral upper and lower blepharplasty  [place] [facility] [place]  Surgeon: Jemal Sanchez M.D.  Surgery Date: 5/6/22  Time of Surgery: 8:05  Where patient plans to recover: At home with family  Fax number for surgical facility:  Type of Anesthesia Anticipated:general for lower blepharoplasty       Subjective     HPI related to upcoming procedure: She has affected visual acuity due to lid lag bilaterally.  Her lower lid surgery is scheduled as cosmetic surgery.     She complains that she wakes around 3 a.m. and cannot fall back to sleep.  SHe spoke with Willis So PharmD and they had discussed  A possible medication, Belsomra.  We discussed that this would not be prescribed while she is still taking narcotics.     Preop Questions 5/2/2022   1. Have you ever had a heart attack or stroke? No   2. Have you ever had surgery on your heart or blood vessels, such as a stent placement, a coronary artery bypass, or surgery on an artery in your head, neck, heart, or legs? No   3. Do you have chest pain with activity? No   4. Do you have a history of  heart failure? No   5. Do you currently have a cold, bronchitis or symptoms of other infection? No   6. Do you have a cough, shortness of breath, or wheezing? No   7. Do you or anyone in your family have previous history of blood clots? YES -    8. Do you or does anyone in your family have a serious bleeding problem such as prolonged bleeding following surgeries or cuts? No   9. Have you ever had problems with anemia or been told to take iron pills? YES -    10. Have you had any abnormal blood loss such as black, tarry or bloody stools, or abnormal vaginal bleeding? YES -   11. Have you ever had a blood transfusion? UNKNOWN -   12. Are you willing to have a blood transfusion if it is medically needed before, during, or after your surgery? Yes   13. Have you or any of your relatives ever had problems with anesthesia? No   14. Do you have sleep apnea, excessive snoring or daytime drowsiness? No   15. Do you have any artifical heart valves or other implanted medical devices like a pacemaker, defibrillator, or continuous glucose monitor? No   16. Do you have artificial joints? No   17. Are you allergic to latex? No       Health Care Directive:  Patient does not have a Health Care Directive or Living Will:     Preoperative Review of :  [id number]}  Reviewed   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     Corneal opacity     Hypothyroidism     Osteopenia, unspecified location     Aromatase inhibitor use     Chronic pain of right knee     DDD (degenerative disc disease), lumbar     Degenerative scoliosis in adult patient     Peripheral neuropathy     Spinal stenosis of lumbar region with neurogenic claudication     Spondylolisthesis of lumbar region     Brain lesion     Dermatochalasis of both upper eyelids     S/P lumbar fusion     Chronic bilateral low back pain     Past Medical History:   Diagnosis Date     Arthritis      Bone disease      Breast cancer (H)      H/O  kyphoplasty      Hearing problem      History of kidney stones      History of radiation therapy      Hyperlipemia      Hypertension      Hypopotassemia      Kidney problem      Lymph edema      Medullary sponge kidney      Osteopenia      PONV (postoperative nausea and vomiting)      Reduced vision      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     Thyroid disease      Past Surgical History:   Procedure Laterality Date     ABDOMEN SURGERY      ovarian cyst, mesh     ARTHRODESIS WRIST Right      ARTHRODESIS WRIST  02/14/2013    Procedure: ARTHRODESIS WRIST;  left wrist scaphoid excision, four bone fusion, iliac crest bone graft  ( Mac with block);  Surgeon: Av Mendez MD;  [facility]     BIOPSY      skin, vaginal     CATARACT IOL, RT/LT Right 03/13/2018     CATARACT IOL, RT/LT Left 02/20/2018     COLONOSCOPY  12/24/2013    Procedure: COMBINED COLONOSCOPY, SINGLE BIOPSY/POLYPECTOMY BY BIOPSY;  COLONOSCOPY;  Surgeon: Dom Alvarez MD;  Location:  GI     ESOPHAGOSCOPY, GASTROSCOPY, DUODENOSCOPY (EGD), COMBINED N/A 11/23/2016    Procedure: COMBINED ESOPHAGOSCOPY, GASTROSCOPY, DUODENOSCOPY (EGD);  Surgeon: Quinten Feliciano MD;  Location:  GI     EXTERNAL EAR SURGERY      right     EYE SURGERY      radial keratomy     FUSION, SPINE, INTERBODY, OBLIQUE ANTERIOR AND LUMBAR, 2 LEVELS, POST APPROACH, USING OTS N/A 11/18/2021    Procedure: Part 1: Oblique Anterior Interbody Fusion at Lumbar 5 to sacral 1 with use of Bone Morphogenic Protein,;  Surgeon: Serge Ramirez MD;  [facility]     GRAFT BONE FROM ILIAC CREST  02/14/2013    Procedure: GRAFT BONE FROM ILIAC CREST;  mac with block and local infilitration;  Surgeon: Av Mendez MD;  [facility]     HC BREATH HYDROGEN TEST N/A 10/14/2016    Procedure: HYDROGEN BREATH TEST;  Surgeon: Cheri Barron MD;  Location:  GI     HERNIA REPAIR      umbilical age 18 mos.     HYSTERECTOMY TOTAL  ABDOMINAL  05/03/2000     MASTECTOMY MODIFIED RADICAL Bilateral     bilateral; right breast prophylactic     OPTICAL TRACKING SYSTEM FUSION POSTERIOR SPINE LUMBAR N/A 11/18/2021    Procedure: Open Posterior Instrumented Spinal Fusion at Lumbar 5 to Sacral 1, with grimm aguayo osteotomy, use of O-Arm/Stealth;  Surgeon: Serge Ramirez MD;  [facility]     PARATHYROIDECTOMY  09/23/2004    R SUPERIOR     PARATHYROIDECTOMY  09/23/2004    parathyroid resection, subtotal     RELEASE CARPAL TUNNEL Right 12/2/2021    Procedure: RIGHT CARPAL TUNNEL RELEASE, RIGHT WRIST HARDWARE REMOVAL, RIGHT CARPAL BOSS EXCISION;  Surgeon: Rolan Conley MD;  Location: [facility] OR     REMOVE HARDWARE HAND  09/24/2013    Procedure: REMOVE HARDWARE HAND;  Left Hand Screw Removal        RHINOPLASTY  1968     thyr proc skin closed cosmetic manner by subcuticular stitch  01/23/2009     THYROPLASTY  10/09/2009     TONSILLECTOMY  1977     WRIST SURGERY      wrist arthrodesis     Immunization History   Administered Date(s) Administered     COVID-19,PF,Pfizer (12+ Yrs) 12/21/2020, 01/08/2021, 10/06/2021     Flu 65+ Years 08/14/2020     HEPA 04/26/2005, 10/26/2005     HepB 04/26/2005, 06/02/2005, 10/26/2005     Influenza (High Dose) 3 valent vaccine 10/06/2017, 10/08/2018, 10/01/2019, 08/14/2020     Influenza (IIV3) PF 10/01/2008, 09/29/2009, 10/01/2011, 10/02/2012, 10/09/2013, 10/01/2014     Influenza Intranasal Vaccine 4 valent (FluMist) 1950     Influenza Vaccine IM > 6 months Valent IIV4 (Alfuria,Fluzone) 10/13/2016     Influenza, Quad, High Dose, Pf, 65yr+ (Fluzone HD) 08/25/2021     Pneumo Conj 13-V (2010&after) 08/01/2017     Pneumococcal 23 valent 10/01/2008, 08/22/2018     TD (ADULT, 7+) 01/10/2000, 04/26/2005     TDAP Vaccine (Boostrix) 11/06/2012     Typhoid IM 04/26/2005     Zoster vaccine recombinant adjuvanted (SHINGRIX) 02/15/2018, 10/18/2018     Zoster vaccine, live 11/14/2012           Review of Systems  Answers for  HPI/ROS submitted by the patient on 5/2/2022  General Symptoms: No  Skin Symptoms: No  HENT Symptoms: Yes  EYE SYMPTOMS: Yes  HEART SYMPTOMS: Yes  LUNG SYMPTOMS: Yes  INTESTINAL SYMPTOMS: No  URINARY SYMPTOMS: Yes  GYNECOLOGIC SYMPTOMS: Yes  BREAST SYMPTOMS: No  SKELETAL SYMPTOMS: Yes  BLOOD SYMPTOMS: No  NERVOUS SYSTEM SYMPTOMS: Yes  MENTAL HEALTH SYMPTOMS: Yes  Congestion: No   Voice hoarseness: No  Tooth pain: No  Gum tenderness: No  Bleeding gums: No  Change in taste: No  Change in sense of smell: No  Dry mouth: Yes  Hearing aid used: Yes  Neck lump: No  Vision loss: No  Dry eyes: Yes  Watery eyes: No  Eye bulging: No  Double vision: No  Flashing of lights: No  Spots: No  Floaters: Yes  Crossed eyes: No  Tunnel Vision: No  Yellowing of eyes: No  Eye irritation: No  Pain in legs with walking: No  Trouble breathing while lying down: No  Fingers or toes appear blue: No  High blood pressure: Yes  Low blood pressure: No  Fainting: No  Murmurs: Yes  Pacemaker: No  Varicose veins: Yes  Edema or swelling: Yes  Wake up at night with shortness of breath: No  Exercise intolerance: Yes  Sputum or phlegm: No  Coughing up blood: No  Snoring: Yes  Difficulty breathing on exertion: No  Nighttime Cough: No  Difficulty breathing when lying flat: No  Trouble holding urine or incontinence: Yes  Increased frequency of urination: Yes  Decreased frequency of urination: No  Frequent nighttime urination: Yes  Bleeding or spotting between periods: No  Heavy or painful periods: No  Irregular periods: No  Hot flashes: No  Vaginal dryness: Yes  Reduced libido: Yes  Difficulty with sexual arousal: Yes  Post-menopausal bleeding: No  Bone pain: Yes  Muscle cramps: No  Joint stiffness: Yes  Bone fracture: Yes  Trouble with coordination: No  Fainting or black-out spells: No  Memory loss: No  Speech problems: No  Tingling: Yes  Difficulty walking: Yes  Paralysis: No  Depression: No  Trouble sleeping: Yes  Mood changes: No  Panic attacks:  No        Patient Active Problem List    Diagnosis Date Noted     Dermatochalasis of both upper eyelids 04/11/2022     Priority: Medium     Added automatically from request for surgery [id number]       Brain lesion 12/16/2021     Priority: Medium     Spinal stenosis of lumbar region with neurogenic claudication 11/18/2021     Priority: Medium     Spondylolisthesis of lumbar region 11/18/2021     Priority: Medium     Peripheral neuropathy 10/27/2021     Priority: Medium     Degenerative scoliosis in adult patient 01/28/2020     Priority: Medium     DDD (degenerative disc disease), lumbar 10/22/2019     Priority: Medium     Chronic pain of right knee 12/27/2018     Priority: Medium     Osteopenia, unspecified location 11/21/2017     Priority: Medium     Aromatase inhibitor use 11/21/2017     Priority: Medium     Infiltrating ductal ca grade 2, ERpositive, PRpositive, HER2 negative by FISH 07/20/2016     Priority: Medium     Problem list name updated by automated process. 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 Provider to review       Dermatitis 11/03/2013     Priority: Medium     Hypovitaminosis D 07/16/2013     Priority: Medium     Contact dermatitis and other eczema, due to unspecified cause 07/16/2013     Priority: Medium     Mechanical problems with limbs 03/25/2013     Priority: Medium     Osteoporosis 03/01/2013     Priority: Medium     Essential hypertension, benign 02/03/2013     Priority: Medium     Inflamed seborrheic keratosis 01/29/2013     Priority: Medium     Personal history of other malignant neoplasm of skin 12/23/2011     Priority: Medium     Hypopotassemia 11/08/2011     Priority: Medium     Hyperlipidemia 11/08/2011     Priority: Medium     Problem list name updated by automated  process. Provider to review       Murmurs 11/08/2011     Priority: Medium     Nephrolithiasis 11/08/2011     Priority: Medium     Osteoarthrosis, hand 11/08/2011     Priority: Medium     Problem list name updated by automated process. 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     Thyroid disease      Past Surgical History:   Procedure Laterality Date     ABDOMEN SURGERY      ovarian cyst, mesh     ARTHRODESIS WRIST Right      ARTHRODESIS WRIST  02/14/2013    Procedure: ARTHRODESIS WRIST;  left wrist scaphoid excision, four bone fusion, iliac crest bone graft  ( Mac with block);  Surgeon: Av Mendez MD;  [facility]     BIOPSY      skin, vaginal     CATARACT IOL, RT/LT Right 03/13/2018     CATARACT IOL, RT/LT Left 02/20/2018     COLONOSCOPY  12/24/2013    Procedure: COMBINED COLONOSCOPY, SINGLE BIOPSY/POLYPECTOMY BY BIOPSY;  COLONOSCOPY;  Surgeon: Dom Alvarez MD;  Location:  GI     ESOPHAGOSCOPY, GASTROSCOPY, DUODENOSCOPY (EGD), COMBINED N/A 11/23/2016    Procedure: COMBINED ESOPHAGOSCOPY, GASTROSCOPY, DUODENOSCOPY (EGD);  Surgeon: Quinten Feliciano MD;  Location:  GI     EXTERNAL EAR SURGERY      right     EYE SURGERY      radial keratomy     FUSION, SPINE, INTERBODY, OBLIQUE ANTERIOR AND LUMBAR, 2 LEVELS, POST APPROACH, USING OTS N/A 11/18/2021    Procedure: Part 1: Oblique Anterior Interbody Fusion at Lumbar 5 to sacral 1 with use of Bone Morphogenic Protein,;  Surgeon: Serge Ramirez MD;  [facility]     GRAFT BONE FROM ILIAC CREST   02/14/2013    Procedure: GRAFT BONE FROM ILIAC CREST;  mac with block and local infilitration;  Surgeon: Av Mendez MD;  [facility]      BREATH HYDROGEN TEST N/A 10/14/2016    Procedure: HYDROGEN BREATH TEST;  Surgeon: Cheri Barron MD;  Location: [facility] GI     HERNIA REPAIR      umbilical age 18 mos.     HYSTERECTOMY TOTAL ABDOMINAL  05/03/2000     MASTECTOMY MODIFIED RADICAL Bilateral     bilateral; right breast prophylactic     OPTICAL TRACKING SYSTEM FUSION POSTERIOR SPINE LUMBAR N/A 11/18/2021    Procedure: Open Posterior Instrumented Spinal Fusion at Lumbar 5 to Sacral 1, with grimm aguayo osteotomy, use of O-Arm/Stealth;  Surgeon: Serge Ramirez MD;  [facility]     PARATHYROIDECTOMY  09/23/2004    R SUPERIOR     PARATHYROIDECTOMY  09/23/2004    parathyroid resection, subtotal     RELEASE CARPAL TUNNEL Right 12/2/2021    Procedure: RIGHT CARPAL TUNNEL RELEASE, RIGHT WRIST HARDWARE REMOVAL, RIGHT CARPAL BOSS EXCISION;  Surgeon: Rolan Conley MD;  Location: [facility] OR     REMOVE HARDWARE HAND  09/24/2013    Procedure: REMOVE HARDWARE HAND;  Left Hand Screw Removal        RHINOPLASTY  1968     thyr proc skin closed cosmetic manner by subcuticular stitch  01/23/2009     THYROPLASTY  10/09/2009     TONSILLECTOMY  1977     WRIST SURGERY      wrist arthrodesis     Current Outpatient Medications   Medication Sig Dispense Refill     acetaminophen (TYLENOL) 500 MG tablet Take 500-1,000 mg by mouth every 6 hours as needed for mild pain       amLODIPine (NORVASC) 10 MG tablet Take 1 tablet (10 mg) by mouth daily (Patient taking differently: Take 10 mg by mouth daily Takes around 10 AM.) 90 tablet 3     Ascorbic Acid (VITAMIN C) 500 MG CHEW Take 1 tablet by mouth daily       atorvastatin (LIPITOR) 80 MG tablet Take 1 tablet (80 mg) by mouth daily (Patient taking differently: Take 80 mg by mouth every evening) 90 tablet 3     Cholecalciferol (VITAMIN D3) 50 MCG (2000 UT) CAPS  Take 6,000 Units by mouth daily (Patient taking differently: Take 6,000 Units by mouth daily (with lunch)) 270 capsule 3     CRANBERRY EXTRACT PO Take 650 mg by mouth daily ~10 am  Takes 1       diclofenac (VOLTAREN) 1 % topical gel APPLY 4 GRAMS TO KNEES OR 2 GRAMS TO HANDS FOUR TIMES DAILY USING ENCLOSED DOSING CARD. (Patient taking differently: Apply 4 grams to knees or 2 grams to hands four times daily as needed using enclosed dosing card.) 100 g 3     gabapentin (NEURONTIN) 300 MG capsule Take 3 capsules (900 mg) by mouth 3 times daily 270 capsule 11     [START ON 5/13/2022] HYDROcodone-acetaminophen (NORCO) 5-325 MG tablet Take 1 tablet by mouth every 6 hours as needed for severe pain Uses about 10 tablets per month. 30 tablet 0     hydrOXYzine (ATARAX) 10 MG tablet Take 1 tablet (10 mg) by mouth 2 times daily as needed for itching (pain adjuvant) 30 tablet 0     ibuprofen (ADVIL/MOTRIN) 600 MG tablet Take 1 tablet (600 mg) by mouth every 6 hours as needed for other (mild and/or inflammatory pain) 30 tablet 0     levothyroxine (SYNTHROID/LEVOTHROID) 112 MCG tablet TAKE 1/2 TABLET BY MOUTH DAILY 45 tablet 3     lisinopril (ZESTRIL) 40 MG tablet Take 1 tablet (40 mg) by mouth daily 90 tablet 3     melatonin 5 MG tablet Take 10 mg by mouth At Bedtime        methocarbamol (ROBAXIN) 500 MG tablet Take 1 tablet (500 mg) by mouth 3 times daily 40 tablet 0     metoprolol succinate ER (TOPROL-XL) 50 MG 24 hr tablet Take 1 tablet (50 mg) by mouth daily (Patient taking differently: Take 50 mg by mouth every morning) 90 tablet 3     Multiple Vitamin (MULTI-VITAMIN) per tablet Take 1 tablet by mouth daily       ondansetron (ZOFRAN-ODT) 4 MG ODT tab Take 1 tablet (4 mg) by mouth every 12 hours as needed for nausea 180 tablet 3     senna-docusate (SENOKOT-S/PERICOLACE) 8.6-50 MG tablet Take 2 tablets by mouth 2 times daily (Patient taking differently: Take 1-2 tablets by mouth 2 times daily as needed) 30 tablet 0      "spironolactone (ALDACTONE) 25 MG tablet Take 2 tablets (50 mg) by mouth daily 90 tablet 3     triamcinolone (KENALOG) 0.1 % external ointment Apply topically 2 times daily (Patient taking differently: Apply topically 2 times daily as needed) 30 g 11     Vaginal Lubricant (REPHRESH) GEL Place 2 g vaginally every 3 days 2 g 3       Allergies   Allergen Reactions     Erythromycin Nausea     Penicillins Hives     Around age 6 - doesn't recall full reaction, mother told her it was hives.     Has tolerated cephalsporins.         Social History     Tobacco Use     Smoking status: Never Smoker     Smokeless tobacco: Never Used   Substance Use Topics     Alcohol use: Not Currently     Alcohol/week: 7.0 standard drinks     Types: 7 Glasses of wine per week     Comment: Rare to occasional       History   Drug Use     Types: Marijuana         Objective  /86 (BP Location: Right arm, Patient Position: Sitting, Cuff Size: Adult Regular)   Pulse 75   Temp 98.8  F (37.1  C) (Oral)   Ht 1.702 m (5' 7\")   Wt 75.3 kg (166 lb)   SpO2 98%   BMI 26.00 kg/m      GENERAL APPEARANCE: ambulates slightly stooped with a walker.   Eyes: grossly normal. Sclera white. She has puffy areas below both eyes.  CARDIO: see VS. RRR, Nl S1S2. No edema in ankles.   Lungs clear bilaterally.   ABDOMEN:  soft, tender to palpation over left lower quadrant, and very slightly tender to palpation over RLQ.  NEURO: alert and oriented.  PSYCHE: normal.    Recent Labs   Lab Test 04/15/22  1632 01/12/22  1034 12/08/21  1550 11/19/21  0514 11/18/21  1859 11/05/21  1324   HGB  --  11.8  --  9.4*   < > 11.5*   PLT  --  359  --  245  --  255   NA  --  144 143 140   < > 142   POTASSIUM 3.9 4.0 3.4 3.6   < > 3.4   CR  --  0.63 0.60 0.78   < > 0.70   A1C 5.9*  --   --   --   --  5.9*    < > = values in this interval not displayed.        Diagnostics:  NONE.      Revised Cardiac Risk Index (RCRI):  The patient has the following serious cardiovascular risks for " perioperative complications:  Advised to stop vitamins and supplements until after surgery, including NSAIDS.   Nadira was seen today for pre-op exam.    Diagnoses and all orders for this visit:    Sleep pattern disturbance  -     hydrOXYzine (VISTARIL) 25 MG capsule; Take 1 capsule (25 mg) by mouth At Bedtime      Total time spent today with this patient including chart review, exam time with patient and documentation : 50 minutes.      Signed Electronically by: MERCEDES Kyle CNP  Copy of this evaluation report is provided to requesting physician.

## 2022-05-03 NOTE — NURSING NOTE
Nadira Perez is a 71 year old female patient that presents today in clinic for the following:    Chief Complaint   Patient presents with     Pre-Op Exam     The patient's allergies and medications were reviewed as noted. A set of vitals were recorded as noted without incident. The patient does not have any other questions for the provider.    Frederick Nova, EMT at 10:46 AM on 5/3/2022

## 2022-05-03 NOTE — PROGRESS NOTES
[facility] Progress Note    Patient Name: Nadira Perez  MRN: [id number]  : 1950  Date: 22      Date of Surgery: 21    Surgery Performed: 1) Open right carpal tunnel release  2) Removal of buried implant (deep) right wrist    Subjective:  Ms. Nadira Perez is a 71 year old female who returns today for bilateral radiocarpal injections.    Overall doing well. Right wrist continues to bother her, some days more than her back. Using wrist braces intermittently. Today 5/10 on the right, left 3/10.    Objective:  There were no vitals taken for this visit.    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 Right s/p hardware removal for second failed 4 corner fusion, and carpal tunnel release.    After obtaining written consent, I cleansed the skin over bilateral wrist joints with chlorhexidine.  I then anesthetized 3 spray, after which I injected 1 cc of Kenalog 40 mg into bilateral radiocarpal joint through the 3-4 interval.  The patient tolerated the procedures well and there were no complications.    Continue brace wear. Patient requests new braces today, bilateral provided. Discussed possibility of injections 3 times a year instead of 2 times a year for symptomatic control, until she decides that she wants to proceed with total wrist fusion on the right.    Follow-up as needed.    Rloan Conley MD    Hand, Upper Extremity & Microvascular Surgery  Department of Orthopedic Surgery  [facility]    Medium Joint Injection/Arthrocentesis: bilateral radiocarpal    Date/Time: 5/3/2022 4:13  PM  Performed by: Rolan Conley MD  Authorized by: Rolan Conley MD     Indications:  Pain  Needle Size:  25 G  Guidance: surface landmarks    Location:  Wrist  Laterality:  Bilateral  Site:  Bilateral radiocarpal  Medications (Right):  1 mL lidocaine 1 %; 40 mg triamcinolone 40 MG/ML  Medications (Left):  1 mL lidocaine 1 %; 40 mg triamcinolone 40 MG/ML  Outcome:  Tolerated well, no immediate complications  Procedure discussed: discussed risks, benefits, and alternatives    Consent Given by:  Patient  Timeout: timeout called immediately prior to procedure    Prep: patient was prepped and draped in usual sterile fashion

## 2022-05-04 ENCOUNTER — ANCILLARY PROCEDURE (OUTPATIENT)
Dept: GENERAL RADIOLOGY | Facility: CLINIC | Age: 72
End: 2022-05-04
Attending: FAMILY MEDICINE
Payer: COMMERCIAL

## 2022-05-04 ENCOUNTER — OFFICE VISIT (OUTPATIENT)
Dept: ORTHOPEDICS | Facility: CLINIC | Age: 72
End: 2022-05-04

## 2022-05-04 ENCOUNTER — THERAPY VISIT (OUTPATIENT)
Dept: PHYSICAL THERAPY | Facility: CLINIC | Age: 72
End: 2022-05-04
Payer: COMMERCIAL

## 2022-05-04 ENCOUNTER — LAB (OUTPATIENT)
Dept: LAB | Facility: CLINIC | Age: 72
End: 2022-05-04
Payer: COMMERCIAL

## 2022-05-04 VITALS — WEIGHT: 166 LBS | HEIGHT: 67 IN | BODY MASS INDEX: 26.06 KG/M2

## 2022-05-04 DIAGNOSIS — S69.92XA FINGER INJURY, LEFT, INITIAL ENCOUNTER: ICD-10-CM

## 2022-05-04 DIAGNOSIS — S92.901A CLOSED FRACTURE OF RIGHT FOOT, INITIAL ENCOUNTER: Primary | ICD-10-CM

## 2022-05-04 DIAGNOSIS — M54.50 CHRONIC BILATERAL LOW BACK PAIN: ICD-10-CM
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
## 2022-05-04 DIAGNOSIS — E67.3 HYPERVITAMINOSIS D: ICD-10-CM

## 2022-05-04 DIAGNOSIS — S82.831A CLOSED FRACTURE OF DISTAL END OF RIGHT FIBULA, UNSPECIFIED FRACTURE MORPHOLOGY, INITIAL ENCOUNTER: ICD-10-CM

## 2022-05-04 DIAGNOSIS — M25.569 KNEE PAIN: ICD-10-CM

## 2022-05-04 DIAGNOSIS — Z98.1 S/P LUMBAR FUSION: ICD-10-CM
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
## 2022-05-04 DIAGNOSIS — S62.524A CLOSED NONDISPLACED FRACTURE OF DISTAL PHALANX OF RIGHT THUMB, INITIAL ENCOUNTER: ICD-10-CM

## 2022-05-04 DIAGNOSIS — G89.29 CHRONIC BILATERAL LOW BACK PAIN: ICD-10-CM

## 2022-05-04 DIAGNOSIS — S89.91XA KNEE INJURY, RIGHT, INITIAL ENCOUNTER: ICD-10-CM

## 2022-05-04 PROCEDURE — 97112 NEUROMUSCULAR REEDUCATION: CPT | Mod: GP | Performed by: PHYSICAL THERAPIST

## 2022-05-04 PROCEDURE — 82306 VITAMIN D 25 HYDROXY: CPT | Performed by: FAMILY MEDICINE

## 2022-05-04 PROCEDURE — 73130 X-RAY EXAM OF HAND: CPT | Mod: RT | Performed by: RADIOLOGY

## 2022-05-04 PROCEDURE — 97161 PT EVAL LOW COMPLEX 20 MIN: CPT | Mod: GP | Performed by: PHYSICAL THERAPIST

## 2022-05-04 PROCEDURE — 36415 COLL VENOUS BLD VENIPUNCTURE: CPT | Performed by: PATHOLOGY

## 2022-05-04 PROCEDURE — 97110 THERAPEUTIC EXERCISES: CPT | Mod: GP | Performed by: PHYSICAL THERAPIST

## 2022-05-04 PROCEDURE — 99214 OFFICE O/P EST MOD 30 MIN: CPT | Performed by: FAMILY MEDICINE

## 2022-05-04 PROCEDURE — 73562 X-RAY EXAM OF KNEE 3: CPT | Mod: RT | Performed by: RADIOLOGY

## 2022-05-04 NOTE — LETTER
5/4/2022      RE: Nadira Perez  [id number]  [facility] [id number]     Dear Colleague,    Thank you for referring your patient, Nadira Perez, to the [facility] [place]. Please see a copy of my visit note below.    Today, the patient reports that she had a recent fall today while at [employer] and fell onto her right side. She hurt her right foot/ankle, right knee and her right thumb and left 4th finger. Her right foot and ankle is most painful and locates it to be over the lateral aspect of the ankle and dorsolateral forefoot. Denies any paresthesias. She is currently taking Tylenol for the pain prn. She was recently seen by Dr. Morgan for her right ankle and they obtained Xr that were negative. She has an upcoming appointment wit dr. Clark on 4/18/22.     Previous right wrist fusion, sees Dr. Conley in Hand Surgery.  Just saw yesterday 5/3/22 for her bilateral wrists, in [place] and had bilateral wrist cortisone injections.    H/o left 5th toe fracture  4/7/22    DEXA scan 11/2021 low bone density.  Osteoporosis per fracture history.  Last Zometa in November 2021.  Fosamax in the past.  Patient is on vitamin D supplement.  Sees Dr. Clark for bone health.     Sees Dr. Clau Antoine in neurology at the [facility] for balance issues, last visit 7/2020.  Pt has dx history of breast cancer status post chemotherapy, peripheral neuropathy, and a lower medullary brain lesion per 7/2020 Neurology note, reviewed by me.      Retired , [place], medical ICU nurse.      3 views left foot radiographs 4/29/2022 1:28 PM     History: Left foot pain     Additional History from EMR: 1 week HISTORY of acute left toe pain  after injury to foot.     Comparison: Left foot radiographs 4/7/2022 and 7/22/2019.     Findings:     Standing AP, oblique, and lateral  views of the right  foot were  obtained.      Comminuted intra-articular minimally displaced fracture of  the left  proximal fifth phalanx. Associated soft tissue swelling about the  fracture. There is osseous fusion of the middle and distal left fifth  phalanx. There is also osseous fusion of the proximal and middle  phalanx of the fourth digit. No other acute osseous abnormality.     Lisfranc articulation alignment is congruent on these weight bearing  images. Osteopenic bones.     Mild degenerative changes of first metatarsophalangeal joint.       Soft tissue is unremarkable.                                                                      Impression:  Comminuted intra-articular minimally displaced fracture of the left  proximal fifth phalanx.           MR right foot without  contrast 11/6/2021 11:40 AM     History: Pain in right toe(s).     Additional History from EMR: Unknown etiology presenting with acute  (2-day) history of right foot, second toe pain and extensive  ecchymosis     Techniques: Multiplanar multisequence imaging of the right foot was  obtained without  administration of intravenous contrast.     Comparison: 10/29/2021     Findings:     Motion partially compromising assessment.     A dorsal external marker is placed at the level of first  interphalangeal and second proximal interphalangeal joints.     Bones     Corresponding to the marker, extensive opposing edema-like marrow  signal intensity involving the second proximal interphalangeal joint  with associated osteophyte. There is relatively more disproportional  amount of edema in the middle phalanx. No fracture line identified.     Areas of subchondral edema-like marrow signal intensity including  second metatarsophalangeal joint, dorsal aspect of the third  metatarsal head are presumably degenerative.     Hammertoe flexion deformity of the third toe is present.      Mild contour abnormality with subcortical edema like marrow signal  intensity for of the first metatarsal head medial eminence, presumably  degenerative.  Bony proliferation at  the anterior process of calcaneus, likely from  remote trauma.     Joints and periarticular soft tissue     Joint effusion: Physiologic amount of joint fluid are present.     Plantar plates: Intersesamoidal ligament and sesamoidal phalangeal  ligaments of the first metatarsophalangeal joints are grossly intact.  Plantar plates of the second through fifth toe at metatarsophalangeal  joints are grossly intact.     Intermetatarsal spaces: Small amount of fluid in the second  interspace.     Ligaments and Tendons     Lisfranc interosseous ligament: Intact.     Tendons: The visualized courses of flexor and extensor tendons are  grossly intact.      Muscles     Severe fatty infiltration/replacement with diffuse muscle edema, most  consistent with microangiopathy/polyneuropathy.     ANCILLARY FINDINGS     Lisfranc interosseous ligament is intact. Mild dorsal subcutaneous  edema.                                                                      Impression:  Motion partially compromising assessment.  1. Corresponding to the external marker, extensive opposing edema at  the second proximal interphalangeal joint. Although these changes may  be related to severe degenerative change, given the acute onset with  ecchymosis and relatively disproportionate edema on the middle  phalanx, superimposed bone contusion is high in differential. No  definite fracture line identified but motion and relative small area  compromise subtle fracture line/displacement assessment by current MR.  Follow up radiographs for evaluation of developing callus may be  helpful.     ERIK AUSTIN         Exam: 3 views of the right wrist dated 12/13/2021.     COMPARISON: 1/2/2020.     CLINICAL HISTORY: Posttraumatic osteoarthrosis.     FINDINGS: AP, oblique, and lateral views of the right wrist were  obtained. Interval removal of surgical hardware from the carpal bones  since the comparison MRI with a single screw fragment remaining. Prior  resection  of the scaphoid. Fusion noted between the capitate, hamate,  lunate, and triquetrum.                                                                      IMPRESSION: Postsurgical changes of fusion in the right wrist carpal  bones, as above.     RUSH REID MD             3 views bilateral wrist radiographs 10/11/2021 4:02 PM     History: Bilateral ganglion cysts of wrists; Bilateral ganglion cysts  of wrists     Comparison: 1/2/2020     Findings:     PA, oblique and lateral view(s) of the each wrist were obtained.      Left:     Redemonstration postsurgical change 4 corner fusion instrumentation  trapeziectomy. Approximately 6 o'clock oriented screw appears to have  no substantial bony purchase and extending into the triangular  fibrocartilage area with ossific density/mineralization , similar to  prior. Ossific densities at the trapezium resection bed as well as  distal to the ulna are similar to prior.     Severe degenerative change distal radioulnar joint, mildly progressed  since prior. Moderate to severe degenerative change first carpal  metacarpal joint, similar to prior. Large cystlike change of distal  ulna, similar to prior.     Bones appear osteopenic.     Right:     Redemonstration of postsurgical change of 4 corner fusion and  instrumentation with trapezium resection. There is interval  dislodgement/migration of one of surgical screw previously oriented  approximately in 9 o'clock direction, now located more dorsal and just  distal to the radius..     Ossific fragment in the trapezium resection bed.     Bones appear osteopenic. Scattered periarticular demineralization.     Mild polyarticular degenerative changes including moderate  degenerative change at first carpometacarpal joint.     Prominent dorsal soft tissue swelling over the wrist, new since  comparison.                                                                      IMPRESSION:  1. Postsurgical changes of right 4 corner fusion  instrumentation with  trapeziectomy. Interim dislodgement/migration of one of surgical screw  previously oriented approximately in 9'oclock direction, now located  more dorsal and just distal to the radius.  2. Prominent dorsal soft tissue swelling over the right wrist, new  since comparison.  3. Mild progression of left distal radioulnar joint severe  degenerative change.     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     Thyroid disease        Active problem list:  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     Corneal opacity     Hypothyroidism     Osteopenia, unspecified location     Aromatase inhibitor use     Chronic pain of right knee     DDD (degenerative disc disease), lumbar     Degenerative scoliosis in adult patient     Peripheral neuropathy     Spinal stenosis of lumbar region with neurogenic claudication     Spondylolisthesis of lumbar region     Brain lesion     Dermatochalasis of both upper eyelids     S/P lumbar fusion     Chronic bilateral low back pain        FH:  Family History   Problem Relation Age of Onset     Neurologic Disorder Mother         Anuerysm of Cerebral Artery, Dementia     Diabetes Mother      Thyroid Disease Mother         ,     Cerebrovascular Disease Mother      Dementia Mother      Osteoporosis Mother      Heart Disease Father         AAA     Hypertension Father      Circulatory Brother         Perihperal Neurophathy     Diabetes Maternal Grandmother      Asthma Maternal Grandmother      Chronic Obstructive Pulmonary Disease Maternal Grandfather         father     Asthma Maternal Grandfather      Diabetes Maternal Aunt         x2     Melanoma Maternal Aunt      Glaucoma Maternal Aunt      Breast Cancer Cousin      Dementia Other      Cancer Other         malignant melanoma     Hypertension Other      Hypertension Other      Cerebrovascular Disease Other      Cerebrovascular Disease Other      Obesity Other      Respiratory Other      Cancer Other      Diabetes Other      Asthma Other      Macular Degeneration No family hx of      Coronary Artery Disease No family hx of      Hyperlipidemia No family hx of      Kidney Disease No family hx of      Thrombosis No family hx of      Arthritis No family hx of      Depression No family hx of      Mental Illness No family hx of      Substance Abuse No family hx of      Cystic Fibrosis No family hx of      Early Death No family hx of      Coronary Artery Disease Early Onset No family hx of      Heart Failure No family hx of      Bleeding Diathesis No family hx of      Ovarian Cancer No family hx of      Uterine Cancer No family hx of      Prostate Cancer No family hx of      Colorectal Cancer No family hx of      Pancreatic Cancer No family hx of      Lung Cancer No family hx of      Other Cancer No family hx of      Autoimmune Disease No family hx of      Unknown/Adopted No family hx of      Genetic Disorder No family hx of      Bleeding Disorder No family hx of      Clotting Disorder No family hx of       Anesthesia Reaction No family hx of        SH:  Social History     Socioeconomic History     Marital status:      Spouse name: Not on file     Number of children: Not on file     Years of education: Not on file     Highest education level: Not on file   Occupational History     Not on file   Tobacco Use     Smoking status: Never Smoker     Smokeless tobacco: Never Used   Substance and Sexual Activity     Alcohol use: Not Currently     Alcohol/week: 7.0 standard drinks     Types: 7 Glasses of wine per week     Comment: Rare to occasional     Drug use: Yes     Types: Marijuana     Sexual activity: Not Currently     Partners: Male     Birth control/protection: Abstinence   Other Topics Concern      Service No     Blood Transfusions Not Asked     Caffeine Concern No     Occupational Exposure No     Hobby Hazards No     Sleep Concern No     Stress Concern No     Weight Concern No     Special Diet No     Back Care No     Exercise Yes     Comment: walks 4-6x  week for 20-30 min. each     Bike Helmet Not Asked     Seat Belt Yes     Self-Exams Yes     Parent/sibling w/ CABG, MI or angioplasty before 65F 55M? No   Social History Narrative    .  Recently retired. 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        She lives with a renter.         She exercises 50 minutes three times a week.     Social Determinants of Health     Financial Resource Strain: Not on file   Food Insecurity: Not on file   Transportation Needs: Not on file   Physical Activity: Not on file   Stress: Not on file   Social Connections: Not on file   Intimate Partner Violence: Not on file   Housing Stability: Not on file       MEDS:  See EMR, reviewed  ALL:  See EMR, reviewed    REVIEW OF SYSTEMS:  CONSTITUTIONAL:NEGATIVE for fever, chills, change in weight  INTEGUMENTARY/SKIN: NEGATIVE for worrisome rashes, moles or lesions  EYES: NEGATIVE for vision changes  or irritation  ENT/MOUTH: NEGATIVE for ear, mouth and throat problems  RESP:NEGATIVE for significant cough or SOB  BREAST: NEGATIVE for masses, tenderness or discharge  CV: NEGATIVE for chest pain, palpitations or peripheral edema  GI: NEGATIVE for nausea, abdominal pain, heartburn, or change in bowel habits  :NEGATIVE for frequency, dysuria, or hematuria  :NEGATIVE for frequency, dysuria, or hematuria  NEURO: NEGATIVE for weakness, dizziness or paresthesias  ENDOCRINE: NEGATIVE for temperature intolerance, skin/hair changes  HEME/ALLERGY/IMMUNE: NEGATIVE for bleeding problems  PSYCHIATRIC: NEGATIVE for changes in mood or affect      Ejective: She has some bruising at the distal thumb the right.  She is mildly tender over the tuft of the thumb.  Nontender at the IP joint or the MCP or CMC joint of the thumb.  Stress testing of the ulnar collateral ligament is without laxity.  She can flex and extend the distal thumb against resistance with good strength.  There is mild swelling at the PIP joint of the fourth finger.  There is no boutonniere or swan-neck deformity.  She can flex independently at the PIP DIP and MCP with good strength against resistance.  She can extend independently at the DIP PIP and MCP joint against resistance with good strength.  She is nontender directly over the volar plate.  Gentle stressing of the collateral ligaments at the DIP joint of the left fourth finger produces some mild discomfort but there is no significant laxity.  I do not find point tenderness at either the right or left wrist centrally, or either at the right or left scaphoid to bony palpation.  The right knee has faint bruising over the patella.  She is tender directly over the patella.  There is mild swelling about the knee.  She is nontender over the medial or lateral joint line.  She is nontender over the tibial tubercle.  Nontender over the proximal fibula.  A gentle anterior and posterior drawer is without significant  laxity.  She tolerates stressing of the MCL and LCL without significant discomfort.  She will do an active straight leg raise with no extensor lag.  There is bruising and swelling over the right forefoot distally about the lateral aspect.  There is no significant swelling at the ankle.  She is tender over the distal fibula, about 2 to 3 cm above the tip of the fibula.  She is nontender about the deltoid ligament.  Nontender over the remainder of the syndesmosis.  Nontender about the navicular or proximal fifth metatarsal of the foot.  She will dorsiflex and volar flex the foot with good strength.  Sensation is intact distally.  She is tender over the distal fourth and fifth metatarsal heads and there is significant bruising.  Skin is intact.  Appropriate in conversation and affect.    X-rays of the left hand do not reveal a fourth finger fracture .  X-rays of the right thumb suggest a nondisplaced fracture involving the tuft of the first digit.  X-rays of the wrist show severe DJD but I do not see an acute fracture.  X-ray of the right ankle suggests a nondisplaced distal fibular fracture that is very subtle.  The mortise appears intact.  X-ray of the right foot suggest nondisplaced metatarsal head fractures of the fifth and fourth digits.    Assessment: Right thumb tuft fracture, nondisplaced.  Right ankle distal fibular fracture nondisplaced, right foot distal fifth and fourth metatarsal fractures nondisplaced.  Soft tissue injury right anterior knee.  Soft tissue injury left fifth finger    Plan: She avoids nonsteroidal anti-inflammatories.  She plans on using Tylenol for pain.  She has a walker to assist with walking.  She was given a cam walker boot for weightbearing as tolerated with the help of the walker.  She was given a Stax splint to protect the thumb distal digit.  She will be seen in follow-up in 10 days for repeat x-ray of the right ankle and foot.      Again, thank you for allowing me to participate  in the care of your patient.      Sincerely,    Chaitanya Miguel MD

## 2022-05-04 NOTE — PROGRESS NOTES
Today, the patient reports that she had a recent fall today while at [employer] and fell onto her right side. She hurt her right foot/ankle, right knee and her right thumb and left 4th finger. Her right foot and ankle is most painful and locates it to be over the lateral aspect of the ankle and dorsolateral forefoot. Denies any paresthesias. She is currently taking Tylenol for the pain prn. She was recently seen by Dr. Morgan for her right ankle and they obtained Xr that were negative. She has an upcoming appointment wit dr. Clark on 4/18/22.     Previous right wrist fusion, sees Dr. Conley in Hand Surgery.  Just saw yesterday 5/3/22 for her bilateral wrists, in [place] and had bilateral wrist cortisone injections.    H/o left 5th toe fracture  4/7/22    DEXA scan 11/2021 low bone density.  Osteoporosis per fracture history.  Last Zometa in November 2021.  Fosamax in the past.  Patient is on vitamin D supplement.  Sees Dr. Clark for bone health.     Sees Dr. Clau Antoine in neurology at the [facility] for balance issues, last visit 7/2020.  Pt has dx history of breast cancer status post chemotherapy, peripheral neuropathy, and a lower medullary brain lesion per 7/2020 Neurology note, reviewed by me.      Retired , [place], medical ICU nurse.      3 views left foot radiographs 4/29/2022 1:28 PM     History: Left foot pain     Additional History from EMR: 1 week HISTORY of acute left toe pain  after injury to foot.     Comparison: Left foot radiographs 4/7/2022 and 7/22/2019.     Findings:     Standing AP, oblique, and lateral  views of the right  foot were  obtained.      Comminuted intra-articular minimally displaced fracture of the left  proximal fifth phalanx. Associated soft tissue swelling about the  fracture. There is osseous fusion of the middle and distal left fifth  phalanx. There is also osseous fusion of the proximal and middle  phalanx of the fourth digit. No other acute osseous  abnormality.     Lisfranc articulation alignment is congruent on these weight bearing  images. Osteopenic bones.     Mild degenerative changes of first metatarsophalangeal joint.       Soft tissue is unremarkable.                                                                      Impression:  Comminuted intra-articular minimally displaced fracture of the left  proximal fifth phalanx.           MR right foot without  contrast 11/6/2021 11:40 AM     History: Pain in right toe(s).     Additional History from EMR: Unknown etiology presenting with acute  (2-day) history of right foot, second toe pain and extensive  ecchymosis     Techniques: Multiplanar multisequence imaging of the right foot was  obtained without  administration of intravenous contrast.     Comparison: 10/29/2021     Findings:     Motion partially compromising assessment.     A dorsal external marker is placed at the level of first  interphalangeal and second proximal interphalangeal joints.     Bones     Corresponding to the marker, extensive opposing edema-like marrow  signal intensity involving the second proximal interphalangeal joint  with associated osteophyte. There is relatively more disproportional  amount of edema in the middle phalanx. No fracture line identified.     Areas of subchondral edema-like marrow signal intensity including  second metatarsophalangeal joint, dorsal aspect of the third  metatarsal head are presumably degenerative.     Hammertoe flexion deformity of the third toe is present.      Mild contour abnormality with subcortical edema like marrow signal  intensity for of the first metatarsal head medial eminence, presumably  degenerative.  Bony proliferation at the anterior process of calcaneus, likely from  remote trauma.     Joints and periarticular soft tissue     Joint effusion: Physiologic amount of joint fluid are present.     Plantar plates: Intersesamoidal ligament and sesamoidal phalangeal  ligaments of the first  metatarsophalangeal joints are grossly intact.  Plantar plates of the second through fifth toe at metatarsophalangeal  joints are grossly intact.     Intermetatarsal spaces: Small amount of fluid in the second  interspace.     Ligaments and Tendons     Lisfranc interosseous ligament: Intact.     Tendons: The visualized courses of flexor and extensor tendons are  grossly intact.      Muscles     Severe fatty infiltration/replacement with diffuse muscle edema, most  consistent with microangiopathy/polyneuropathy.     ANCILLARY FINDINGS     Lisfranc interosseous ligament is intact. Mild dorsal subcutaneous  edema.                                                                      Impression:  Motion partially compromising assessment.  1. Corresponding to the external marker, extensive opposing edema at  the second proximal interphalangeal joint. Although these changes may  be related to severe degenerative change, given the acute onset with  ecchymosis and relatively disproportionate edema on the middle  phalanx, superimposed bone contusion is high in differential. No  definite fracture line identified but motion and relative small area  compromise subtle fracture line/displacement assessment by current MR.  Follow up radiographs for evaluation of developing callus may be  helpful.     ERIK AUSTIN         Exam: 3 views of the right wrist dated 12/13/2021.     COMPARISON: 1/2/2020.     CLINICAL HISTORY: Posttraumatic osteoarthrosis.     FINDINGS: AP, oblique, and lateral views of the right wrist were  obtained. Interval removal of surgical hardware from the carpal bones  since the comparison MRI with a single screw fragment remaining. Prior  resection of the scaphoid. Fusion noted between the capitate, hamate,  lunate, and triquetrum.                                                                      IMPRESSION: Postsurgical changes of fusion in the right wrist carpal  bones, as above.     RUSH REID MD              3 views bilateral wrist radiographs 10/11/2021 4:02 PM     History: Bilateral ganglion cysts of wrists; Bilateral ganglion cysts  of wrists     Comparison: 1/2/2020     Findings:     PA, oblique and lateral view(s) of the each wrist were obtained.      Left:     Redemonstration postsurgical change 4 corner fusion instrumentation  trapeziectomy. Approximately 6 o'clock oriented screw appears to have  no substantial bony purchase and extending into the triangular  fibrocartilage area with ossific density/mineralization , similar to  prior. Ossific densities at the trapezium resection bed as well as  distal to the ulna are similar to prior.     Severe degenerative change distal radioulnar joint, mildly progressed  since prior. Moderate to severe degenerative change first carpal  metacarpal joint, similar to prior. Large cystlike change of distal  ulna, similar to prior.     Bones appear osteopenic.     Right:     Redemonstration of postsurgical change of 4 corner fusion and  instrumentation with trapezium resection. There is interval  dislodgement/migration of one of surgical screw previously oriented  approximately in 9 o'clock direction, now located more dorsal and just  distal to the radius..     Ossific fragment in the trapezium resection bed.     Bones appear osteopenic. Scattered periarticular demineralization.     Mild polyarticular degenerative changes including moderate  degenerative change at first carpometacarpal joint.     Prominent dorsal soft tissue swelling over the wrist, new since  comparison.                                                                      IMPRESSION:  1. Postsurgical changes of right 4 corner fusion instrumentation with  trapeziectomy. Interim dislodgement/migration of one of surgical screw  previously oriented approximately in 9'oclock direction, now located  more dorsal and just distal to the radius.  2. Prominent dorsal soft tissue swelling over the right wrist,  new  since comparison.  3. Mild progression of left distal radioulnar joint severe  degenerative change.     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     Thyroid disease        Active problem list:  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     Corneal opacity     Hypothyroidism     Osteopenia, unspecified location     Aromatase inhibitor use     Chronic pain of right knee     DDD (degenerative disc disease), lumbar     Degenerative scoliosis in adult patient     Peripheral neuropathy     Spinal stenosis of lumbar region with neurogenic claudication     Spondylolisthesis of lumbar region     Brain lesion     Dermatochalasis of both upper eyelids     S/P lumbar fusion     Chronic bilateral low back pain       FH:  Family History   Problem Relation Age of Onset     Neurologic Disorder Mother         Anuerysm of Cerebral Artery, Dementia     Diabetes Mother      Thyroid Disease Mother         ,     Cerebrovascular Disease Mother      Dementia Mother      Osteoporosis Mother       Heart Disease Father         AAA     Hypertension Father      Circulatory Brother         Perihperal Neurophathy     Diabetes Maternal Grandmother      Asthma Maternal Grandmother      Chronic Obstructive Pulmonary Disease Maternal Grandfather         father     Asthma Maternal Grandfather      Diabetes Maternal Aunt         x2     Melanoma Maternal Aunt      Glaucoma Maternal Aunt      Breast Cancer Cousin      Dementia Other      Cancer Other         malignant melanoma     Hypertension Other      Hypertension Other      Cerebrovascular Disease Other      Cerebrovascular Disease Other      Obesity Other      Respiratory Other      Cancer Other      Diabetes Other      Asthma Other      Macular Degeneration No family hx of      Coronary Artery Disease No family hx of      Hyperlipidemia No family hx of      Kidney Disease No family hx of      Thrombosis No family hx of      Arthritis No family hx of      Depression No family hx of      Mental Illness No family hx of      Substance Abuse No family hx of      Cystic Fibrosis No family hx of      Early Death No family hx of      Coronary Artery Disease Early Onset No family hx of      Heart Failure No family hx of      Bleeding Diathesis No family hx of      Ovarian Cancer No family hx of      Uterine Cancer No family hx of      Prostate Cancer No family hx of      Colorectal Cancer No family hx of      Pancreatic Cancer No family hx of      Lung Cancer No family hx of      Other Cancer No family hx of      Autoimmune Disease No family hx of      Unknown/Adopted No family hx of      Genetic Disorder No family hx of      Bleeding Disorder No family hx of      Clotting Disorder No family hx of      Anesthesia Reaction No family hx of        SH:  Social History     Socioeconomic History     Marital status:      Spouse name: Not on file     Number of children: Not on file     Years of education: Not on file     Highest education level: Not on file   Occupational  History     Not on file   Tobacco Use     Smoking status: Never Smoker     Smokeless tobacco: Never Used   Substance and Sexual Activity     Alcohol use: Not Currently     Alcohol/week: 7.0 standard drinks     Types: 7 Glasses of wine per week     Comment: Rare to occasional     Drug use: Yes     Types: Marijuana     Sexual activity: Not Currently     Partners: Male     Birth control/protection: Abstinence   Other Topics Concern      Service No     Blood Transfusions Not Asked     Caffeine Concern No     Occupational Exposure No     Hobby Hazards No     Sleep Concern No     Stress Concern No     Weight Concern No     Special Diet No     Back Care No     Exercise Yes     Comment: walks 4-6x  week for 20-30 min. each     Bike Helmet Not Asked     Seat Belt Yes     Self-Exams Yes     Parent/sibling w/ CABG, MI or angioplasty before 65F 55M? No   Social History Narrative    .  Recently retired. 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        She lives with a renter.         She exercises 50 minutes three times a week.     Social Determinants of Health     Financial Resource Strain: Not on file   Food Insecurity: Not on file   Transportation Needs: Not on file   Physical Activity: Not on file   Stress: Not on file   Social Connections: Not on file   Intimate Partner Violence: Not on file   Housing Stability: Not on file       MEDS:  See EMR, reviewed  ALL:  See EMR, reviewed    REVIEW OF SYSTEMS:  CONSTITUTIONAL:NEGATIVE for fever, chills, change in weight  INTEGUMENTARY/SKIN: NEGATIVE for worrisome rashes, moles or lesions  EYES: NEGATIVE for vision changes or irritation  ENT/MOUTH: NEGATIVE for ear, mouth and throat problems  RESP:NEGATIVE for significant cough or SOB  BREAST: NEGATIVE for masses, tenderness or discharge  CV: NEGATIVE for chest pain, palpitations or peripheral edema  GI: NEGATIVE for nausea, abdominal pain,  heartburn, or change in bowel habits  :NEGATIVE for frequency, dysuria, or hematuria  :NEGATIVE for frequency, dysuria, or hematuria  NEURO: NEGATIVE for weakness, dizziness or paresthesias  ENDOCRINE: NEGATIVE for temperature intolerance, skin/hair changes  HEME/ALLERGY/IMMUNE: NEGATIVE for bleeding problems  PSYCHIATRIC: NEGATIVE for changes in mood or affect      Ejective: She has some bruising at the distal thumb the right.  She is mildly tender over the tuft of the thumb.  Nontender at the IP joint or the MCP or CMC joint of the thumb.  Stress testing of the ulnar collateral ligament is without laxity.  She can flex and extend the distal thumb against resistance with good strength.  There is mild swelling at the PIP joint of the fourth finger.  There is no boutonniere or swan-neck deformity.  She can flex independently at the PIP DIP and MCP with good strength against resistance.  She can extend independently at the DIP PIP and MCP joint against resistance with good strength.  She is nontender directly over the volar plate.  Gentle stressing of the collateral ligaments at the DIP joint of the left fourth finger produces some mild discomfort but there is no significant laxity.  I do not find point tenderness at either the right or left wrist centrally, or either at the right or left scaphoid to bony palpation.  The right knee has faint bruising over the patella.  She is tender directly over the patella.  There is mild swelling about the knee.  She is nontender over the medial or lateral joint line.  She is nontender over the tibial tubercle.  Nontender over the proximal fibula.  A gentle anterior and posterior drawer is without significant laxity.  She tolerates stressing of the MCL and LCL without significant discomfort.  She will do an active straight leg raise with no extensor lag.  There is bruising and swelling over the right forefoot distally about the lateral aspect.  There is no significant swelling  at the ankle.  She is tender over the distal fibula, about 2 to 3 cm above the tip of the fibula.  She is nontender about the deltoid ligament.  Nontender over the remainder of the syndesmosis.  Nontender about the navicular or proximal fifth metatarsal of the foot.  She will dorsiflex and volar flex the foot with good strength.  Sensation is intact distally.  She is tender over the distal fourth and fifth metatarsal heads and there is significant bruising.  Skin is intact.  Appropriate in conversation and affect.    X-rays of the left hand do not reveal a fourth finger fracture .  X-rays of the right thumb suggest a nondisplaced fracture involving the tuft of the first digit.  X-rays of the wrist show severe DJD but I do not see an acute fracture.  X-ray of the right ankle suggests a nondisplaced distal fibular fracture that is very subtle.  The mortise appears intact.  X-ray of the right foot suggest nondisplaced metatarsal head fractures of the fifth and fourth digits.    Assessment: Right thumb tuft fracture, nondisplaced.  Right ankle distal fibular fracture nondisplaced, right foot distal fifth and fourth metatarsal fractures nondisplaced.  Soft tissue injury right anterior knee.  Soft tissue injury left fifth finger    Plan: She avoids nonsteroidal anti-inflammatories.  She plans on using Tylenol for pain.  She has a walker to assist with walking.  She was given a cam walker boot for weightbearing as tolerated with the help of the walker.  She was given a Stax splint to protect the thumb distal digit.  She will be seen in follow-up in 10 days for repeat x-ray of the right ankle and foot.

## 2022-05-04 NOTE — PROGRESS NOTES
[facility]    OUTPATIENT Physical Therapy ORTHOPEDIC EVALUATION  PLAN OF TREATMENT FOR OUTPATIENT REHABILITATION  (COMPLETE FOR INITIAL CLAIMS ONLY)  Patient's Last Name, First Name, M.I.  YOB: 1950  Nadira Perez    Provider s Name:  [facility]   Medical Record No.  [id number]   Start of Care Date:  05/04/22   Onset Date:  11/18/21    Type:     _X__PT   ___OT Medical Diagnosis:    Encounter Diagnoses   Name Primary?    S/P lumbar fusion     Chronic bilateral low back pain         Treatment Diagnosis:  S/P L5-S1 fusion        Goals:     05/04/22 0500   Body Part   Goals listed below are for S/P L5-S1 fusion   Goal #2   Goal #2 standing   Previous Functional Level No restrictions   Current Functional Level Minutes patient can stand   Performance level 5 4/10 pain   STG [employer] Performance Minutes patient will be able to stand   Performance level 10 with 3/10 pain   Rationale for housekeeping tasks such as vacuuming, bed making, mowing, gardening;for personal hygiene;for meal preparation;for safe household ambulation;for safe community ambulation   Due date 05/25/22   LTG [employer] Performance Minutes patient will be able to stand   Performance Level 30 pain free   Rationale for housekeeping tasks such as vacuuming, bed making, mowing;for personal hygiene;for meal preparation;for safe household ambulation   Due date 07/27/22       Therapy Frequency:  1x/week  Predicted Duration of Therapy Intervention:  12 weeks    Chaitanya Villasenor, PT                 I CERTIFY THE NEED FOR THESE SERVICES FURNISHED UNDER        THIS PLAN OF TREATMENT AND WHILE UNDER MY CARE     (Physician attestation of this document indicates review and certification of the therapy plan).                     Certification Date From:  05/04/22   Certification Date To:  08/01/22    Referring  Provider:  Serge Ramirez    Initial Assessment        See Epic Evaluation SOC Date: 05/04/22

## 2022-05-04 NOTE — PROGRESS NOTES
Physical Therapy Initial Evaluation  Subjective:  The history is provided by the patient. No  was used.   Patient Health History  Nadira Perez being seen for Start PT after low back fusion on 11/18/21..     Problem began: 11/18/2021.   Problem occurred: Osteoporosis and arthritis over time   Pain is reported as 3/10 on pain scale.  General health as reported by patient is fair.  Pertinent medical history includes: anemia, changes in bowel/bladder, cancer, concussions/dizziness, fibromyalgia, history of fractures, heart problems, hepatitis, high blood pressure, menopausal, numbness/tingling, overweight, osteoarthritis, osteoporosis, pain at night/rest, sleep disorder/apnea, thyroid problems and weakness.     Medical allergies: none.   Surgeries include:  Orthopedic surgery and cancer surgery.    Current medications:  Anti-inflammatory, bone density, high blood pressure medication, muscle relaxants, pain medication, sleep medication and thyroid medication.    Current occupation is RN consultant (semi-retired).   Primary job tasks include:  Computer work and prolonged sitting.                  Therapist Generated HPI Evaluation  Problem details: Pt c/o LBP and stiffness S/P L5-S1 fusion 11/18/2021.  Fell 1 month after surgery resulting in 3 fractures around area of fusion.  This delayed normal progression of recovery and start of therapy.  Also c/o L foot/ankle was injured after falling but doing better now.  MD order date 5/3/2022..         Type of problem:  Lumbar.    This is a new condition.  Condition occurred with:  Insidious onset.  Where condition occurred: for unknown reasons.  Patient reports pain:  Lower lumbar spine, central lumbar spine, lumbar spine left and lumbar spine right.  Pain is described as aching, cramping, sharp, shooting and stabbing and is constant.  Pain radiates to:  Gluteals left and gluteals right. Pain is worse in the A.M. and worse in the P.M..  Since onset  symptoms are gradually improving.  Associated symptoms:  Loss of motion/stiffness, loss of strength, loss of balance and numbness (B L/E-neuropathy). Symptoms are exacerbated by bending, standing, walking, lifting and twisting  and relieved by rest, NSAID's and muscle relaxants.      Barriers include:  None as reported by patient.      Oswestry Score: 44.44 %                 Objective:  Standing Alignment:    Cervical/Thoracic:  Convex thoracic scoliosis L    Lumbar:  Convex scoliosis R                           Lumbar/SI Evaluation  ROM:    AROM Lumbar:   Flexion:          Min/mod loss +/-  Ext:                    Mod/max loss compensation in T spine +/-   Side Bend:        Left:  Min loss +/-    Right:  Mod loss +  Rotation:           Left:     Right:   Side Glide:        Left:     Right:         Strength: poor core stab recruitment, abdominal strength 3+/5 (R hernia noted).  Glute med B 3+/5, max 4-/5        Lumbar Dermtomes:  Lumbar dermatomes: decreased sensation B lower leg not in dermatome pattern.                Neural Tension/Mobility:  Lumbar:  Normal        Lumbar Palpation:    Tenderness present at Left:    Erector Spinae  Tenderness present at Right: Erector Spinae  Functional Tests:      Single Leg Bridge: Left:    Right:   % of Uninvolved:  10 dbl legged fair control with cuing to maintain                                                      General     ROS    Assessment/Plan:    Patient is a 71 year old female with lumbar complaints.    Patient has the following significant findings with corresponding treatment plan.                Diagnosis 1:  S/P L5-S1 fusion  Pain -  hot/cold therapy, manual therapy, directional preference exercise and home program  Decreased ROM/flexibility - manual therapy and therapeutic exercise  Decreased strength - therapeutic exercise and therapeutic activities  Decreased proprioception - neuro re-education and therapeutic activities  Impaired gait - gait  training  Impaired muscle performance - neuro re-education  Decreased function - therapeutic activities  Impaired posture - neuro re-education    Therapy Evaluation Codes:   Cumulative Therapy Evaluation is: Low complexity.    Previous and current functional limitations:  (See Goal Flow Sheet for this information)    Short term and Long term goals: (See Goal Flow Sheet for this information)     Communication ability:  Patient appears to be able to clearly communicate and understand verbal and written communication and follow directions correctly.  Treatment Explanation - The following has been discussed with the patient:   RX ordered/plan of care  Anticipated outcomes  Possible risks and side effects  This patient would benefit from PT intervention to resume normal activities.   Rehab potential is good.    Frequency:  1 X week, once daily  Duration:  for 12 weeks  Discharge Plan:  Achieve all LTG.  Independent in home treatment program.  Reach maximal therapeutic benefit.    Please refer to the daily flowsheet for treatment today, total treatment time and time spent performing 1:1 timed codes.

## 2022-05-05 ENCOUNTER — ANESTHESIA EVENT (OUTPATIENT)
Dept: SURGERY | Facility: AMBULATORY SURGERY CENTER | Age: 72
End: 2022-05-05
Payer: COMMERCIAL

## 2022-05-05 LAB — DEPRECATED CALCIDIOL+CALCIFEROL SERPL-MC: 43 UG/L (ref 20–75)

## 2022-05-06 ENCOUNTER — ANESTHESIA (OUTPATIENT)
Dept: SURGERY | Facility: AMBULATORY SURGERY CENTER | Age: 72
End: 2022-05-06
Payer: COMMERCIAL

## 2022-05-06 ENCOUNTER — TELEPHONE (OUTPATIENT)
Dept: OPHTHALMOLOGY | Facility: CLINIC | Age: 72
End: 2022-05-06

## 2022-05-06 ENCOUNTER — HOSPITAL ENCOUNTER (OUTPATIENT)
Facility: AMBULATORY SURGERY CENTER | Age: 72
Discharge: HOME | End: 2022-05-06
Attending: OPHTHALMOLOGY
Payer: COMMERCIAL

## 2022-05-06 VITALS
OXYGEN SATURATION: 96 % | RESPIRATION RATE: 16 BRPM | HEIGHT: 67 IN | TEMPERATURE: 98.2 F | HEART RATE: 78 BPM | WEIGHT: 163 LBS | DIASTOLIC BLOOD PRESSURE: 90 MMHG | BODY MASS INDEX: 25.58 KG/M2 | SYSTOLIC BLOOD PRESSURE: 137 MMHG

## 2022-05-06 DIAGNOSIS — H02.831 DERMATOCHALASIS OF BOTH UPPER EYELIDS: Primary | ICD-10-CM

## 2022-05-06 DIAGNOSIS — H02.834 DERMATOCHALASIS OF BOTH UPPER EYELIDS: ICD-10-CM

## 2022-05-06 DIAGNOSIS — H02.834 DERMATOCHALASIS OF BOTH UPPER EYELIDS: Primary | ICD-10-CM

## 2022-05-06 DIAGNOSIS — H02.831 DERMATOCHALASIS OF BOTH UPPER EYELIDS: ICD-10-CM

## 2022-05-06 DIAGNOSIS — Z98.890 POSTOPERATIVE EYE STATE: Primary | ICD-10-CM

## 2022-05-06 PROCEDURE — 15823 BLEPHARP UPR EYELID XCSV SKN: CPT | Mod: 50

## 2022-05-06 PROCEDURE — 96999 UNLISTED SPEC DERM SVC/PX: CPT | Performed by: OPHTHALMOLOGY

## 2022-05-06 PROCEDURE — 370N000011: Mod: DBP

## 2022-05-06 PROCEDURE — 15823 BLEPHARP UPR EYELID XCSV SKN: CPT | Mod: 50 | Performed by: OPHTHALMOLOGY

## 2022-05-06 PROCEDURE — 360N000061 HC FACILITY ASC - 30 MINUTES: Mod: DBP

## 2022-05-06 RX ORDER — HYDROCODONE BITARTRATE AND ACETAMINOPHEN 5; 325 MG/1; MG/1
1 TABLET ORAL EVERY 6 HOURS PRN
Qty: 10 TABLET | Refills: 0 | Status: SHIPPED | OUTPATIENT
Start: 2022-05-06 | End: 2022-05-09

## 2022-05-06 RX ORDER — FENTANYL CITRATE 50 UG/ML
25 INJECTION, SOLUTION INTRAMUSCULAR; INTRAVENOUS EVERY 5 MIN PRN
Status: DISCONTINUED | OUTPATIENT
Start: 2022-05-06 | End: 2022-05-06 | Stop reason: HOSPADM

## 2022-05-06 RX ORDER — LIDOCAINE 40 MG/G
CREAM TOPICAL
Status: DISCONTINUED | OUTPATIENT
Start: 2022-05-06 | End: 2022-05-06 | Stop reason: HOSPADM

## 2022-05-06 RX ORDER — FENTANYL CITRATE 50 UG/ML
INJECTION, SOLUTION INTRAMUSCULAR; INTRAVENOUS PRN
Status: DISCONTINUED | OUTPATIENT
Start: 2022-05-06 | End: 2022-05-06

## 2022-05-06 RX ORDER — PROPOFOL 10 MG/ML
INJECTION, EMULSION INTRAVENOUS CONTINUOUS PRN
Status: DISCONTINUED | OUTPATIENT
Start: 2022-05-06 | End: 2022-05-06

## 2022-05-06 RX ORDER — BACITRACIN 500 [USP'U]/G
OINTMENT OPHTHALMIC
Qty: 3.5 G | Refills: 0 | Status: SHIPPED | OUTPATIENT
Start: 2022-05-06 | End: 2022-07-16

## 2022-05-06 RX ORDER — FENTANYL CITRATE 50 UG/ML
25 INJECTION, SOLUTION INTRAMUSCULAR; INTRAVENOUS
Status: DISCONTINUED | OUTPATIENT
Start: 2022-05-06 | End: 2022-05-07 | Stop reason: HOSPADM

## 2022-05-06 RX ORDER — ACETAMINOPHEN 325 MG/1
975 TABLET ORAL ONCE
Status: DISCONTINUED | OUTPATIENT
Start: 2022-05-06 | End: 2022-05-06 | Stop reason: HOSPADM

## 2022-05-06 RX ORDER — OXYCODONE HYDROCHLORIDE 5 MG/1
5 TABLET ORAL EVERY 4 HOURS PRN
Status: DISCONTINUED | OUTPATIENT
Start: 2022-05-06 | End: 2022-05-07 | Stop reason: HOSPADM

## 2022-05-06 RX ORDER — PROPOFOL 10 MG/ML
INJECTION, EMULSION INTRAVENOUS PRN
Status: DISCONTINUED | OUTPATIENT
Start: 2022-05-06 | End: 2022-05-06

## 2022-05-06 RX ORDER — ONDANSETRON 4 MG/1
4 TABLET, ORALLY DISINTEGRATING ORAL EVERY 30 MIN PRN
Status: DISCONTINUED | OUTPATIENT
Start: 2022-05-06 | End: 2022-05-07 | Stop reason: HOSPADM

## 2022-05-06 RX ORDER — SODIUM CHLORIDE, SODIUM LACTATE, POTASSIUM CHLORIDE, CALCIUM CHLORIDE 600; 310; 30; 20 MG/100ML; MG/100ML; MG/100ML; MG/100ML
INJECTION, SOLUTION INTRAVENOUS CONTINUOUS
Status: DISCONTINUED | OUTPATIENT
Start: 2022-05-06 | End: 2022-05-06 | Stop reason: HOSPADM

## 2022-05-06 RX ORDER — NEOMYCIN SULFATE, POLYMYXIN B SULFATE AND DEXAMETHASONE 3.5; 10000; 1 MG/ML; [USP'U]/ML; MG/ML
1-2 SUSPENSION/ DROPS OPHTHALMIC 4 TIMES DAILY
Qty: 4 ML | Refills: 0 | Status: SHIPPED | OUTPATIENT
Start: 2022-05-06 | End: 2022-05-16

## 2022-05-06 RX ORDER — HYDROMORPHONE HYDROCHLORIDE 1 MG/ML
0.2 INJECTION, SOLUTION INTRAMUSCULAR; INTRAVENOUS; SUBCUTANEOUS EVERY 5 MIN PRN
Status: DISCONTINUED | OUTPATIENT
Start: 2022-05-06 | End: 2022-05-06 | Stop reason: HOSPADM

## 2022-05-06 RX ORDER — ONDANSETRON 2 MG/ML
4 INJECTION INTRAMUSCULAR; INTRAVENOUS EVERY 30 MIN PRN
Status: DISCONTINUED | OUTPATIENT
Start: 2022-05-06 | End: 2022-05-07 | Stop reason: HOSPADM

## 2022-05-06 RX ORDER — LIDOCAINE HYDROCHLORIDE AND EPINEPHRINE 10; 10 MG/ML; UG/ML
INJECTION, SOLUTION INFILTRATION; PERINEURAL PRN
Status: DISCONTINUED | OUTPATIENT
Start: 2022-05-06 | End: 2022-05-06 | Stop reason: HOSPADM

## 2022-05-06 RX ORDER — BACITRACIN 500 [USP'U]/G
OINTMENT OPHTHALMIC PRN
Status: DISCONTINUED | OUTPATIENT
Start: 2022-05-06 | End: 2022-05-06 | Stop reason: HOSPADM

## 2022-05-06 RX ORDER — MEPERIDINE HYDROCHLORIDE 25 MG/ML
12.5 INJECTION INTRAMUSCULAR; INTRAVENOUS; SUBCUTANEOUS
Status: DISCONTINUED | OUTPATIENT
Start: 2022-05-06 | End: 2022-05-07 | Stop reason: HOSPADM

## 2022-05-06 RX ORDER — ONDANSETRON 2 MG/ML
INJECTION INTRAMUSCULAR; INTRAVENOUS PRN
Status: DISCONTINUED | OUTPATIENT
Start: 2022-05-06 | End: 2022-05-06

## 2022-05-06 RX ORDER — LIDOCAINE HYDROCHLORIDE 20 MG/ML
INJECTION, SOLUTION INFILTRATION; PERINEURAL PRN
Status: DISCONTINUED | OUTPATIENT
Start: 2022-05-06 | End: 2022-05-06

## 2022-05-06 RX ORDER — SODIUM CHLORIDE, SODIUM LACTATE, POTASSIUM CHLORIDE, CALCIUM CHLORIDE 600; 310; 30; 20 MG/100ML; MG/100ML; MG/100ML; MG/100ML
INJECTION, SOLUTION INTRAVENOUS CONTINUOUS
Status: DISCONTINUED | OUTPATIENT
Start: 2022-05-06 | End: 2022-05-07 | Stop reason: HOSPADM

## 2022-05-06 RX ORDER — DEXAMETHASONE SODIUM PHOSPHATE 4 MG/ML
INJECTION, SOLUTION INTRA-ARTICULAR; INTRALESIONAL; INTRAMUSCULAR; INTRAVENOUS; SOFT TISSUE PRN
Status: DISCONTINUED | OUTPATIENT
Start: 2022-05-06 | End: 2022-05-06

## 2022-05-06 RX ADMIN — PROPOFOL 50 MG: 10 INJECTION, EMULSION INTRAVENOUS at 09:09

## 2022-05-06 RX ADMIN — PROPOFOL 200 MG: 10 INJECTION, EMULSION INTRAVENOUS at 08:40

## 2022-05-06 RX ADMIN — ONDANSETRON 4 MG: 2 INJECTION INTRAMUSCULAR; INTRAVENOUS at 08:49

## 2022-05-06 RX ADMIN — ONDANSETRON 4 MG: 2 INJECTION INTRAMUSCULAR; INTRAVENOUS at 10:34

## 2022-05-06 RX ADMIN — FENTANYL CITRATE 50 MCG: 50 INJECTION, SOLUTION INTRAMUSCULAR; INTRAVENOUS at 09:01

## 2022-05-06 RX ADMIN — LIDOCAINE HYDROCHLORIDE 100 MG: 20 INJECTION, SOLUTION INFILTRATION; PERINEURAL at 08:40

## 2022-05-06 RX ADMIN — PROPOFOL 150 MCG/KG/MIN: 10 INJECTION, EMULSION INTRAVENOUS at 08:40

## 2022-05-06 RX ADMIN — OXYCODONE HYDROCHLORIDE 5 MG: 5 TABLET ORAL at 09:52

## 2022-05-06 RX ADMIN — DEXAMETHASONE SODIUM PHOSPHATE 4 MG: 4 INJECTION, SOLUTION INTRA-ARTICULAR; INTRALESIONAL; INTRAMUSCULAR; INTRAVENOUS; SOFT TISSUE at 08:49

## 2022-05-06 RX ADMIN — FENTANYL CITRATE 50 MCG: 50 INJECTION, SOLUTION INTRAMUSCULAR; INTRAVENOUS at 08:40

## 2022-05-06 RX ADMIN — SODIUM CHLORIDE, SODIUM LACTATE, POTASSIUM CHLORIDE, CALCIUM CHLORIDE: 600; 310; 30; 20 INJECTION, SOLUTION INTRAVENOUS at 08:28

## 2022-05-06 ASSESSMENT — ENCOUNTER SYMPTOMS
SEIZURES: 0
DYSRHYTHMIAS: 1

## 2022-05-06 ASSESSMENT — LIFESTYLE VARIABLES: TOBACCO_USE: 0

## 2022-05-06 NOTE — ANESTHESIA CARE TRANSFER NOTE
Patient: Nadira Perez    Procedure: Procedure(s):  UPPER BLEPHAROPLASTY, BILATERAL  BLEPHAROPLASTY, LOWER EYELID, BILATERAL, COSMETIC       Diagnosis: Dermatochalasis of both upper eyelids [H02.831, H02.834]  Diagnosis Additional Information: No value filed.    Anesthesia Type:   General     Note:    Oropharynx: oropharynx clear of all foreign objects  Level of Consciousness: awake  Oxygen Supplementation: room air    Independent Airway: airway patency satisfactory and stable  Dentition: dentition unchanged  Vital Signs Stable: post-procedure vital signs reviewed and stable  Report to RN Given: handoff report given  Patient transferred to: PACU    Handoff Report: Identifed the Patient, Identified the Reponsible Provider, Reviewed the pertinent medical history, Discussed the surgical course, Reviewed Intra-OP anesthesia mangement and issues during anesthesia, Set expectations for post-procedure period and Allowed opportunity for questions and acknowledgement of understanding      Vitals:  Vitals Value Taken Time   /95 05/06/22 0943   Temp     Pulse 83 05/06/22 0945   Resp 19 05/06/22 0945   SpO2 97 % 05/06/22 0945   Vitals shown include unvalidated device data.    Electronically Signed By: MERCEDES Suarez CRNA  May 6, 2022  9:46 AM

## 2022-05-06 NOTE — BRIEF OP NOTE
[facility] Brief Operative Note    Pre-operative diagnosis: Dermatochalasis of both upper eyelids [H02.831, H02.834]   Post-operative diagnosis Same as preop   Procedure: Procedure(s):  UPPER BLEPHAROPLASTY, BILATERAL  BLEPHAROPLASTY, LOWER EYELID, BILATERAL, COSMETIC   Surgeon(s): Surgeon(s) and Role:     * Jemal Sanchez MD - Primary   Estimated blood loss: * No values recorded between 5/6/2022 12:00 AM and 5/6/2022  8:28 AM *    Specimens: * No specimens in log *   Findings: As expected

## 2022-05-06 NOTE — DISCHARGE INSTRUCTIONS
Post-operative Instructions    Ophthalmic Plastic and Reconstructive Surgery  Jemal Sanchez M.D.  Gracie Hill M.D.    All instructions apply to the operated eye(s) or eyelid(s).  Wound care and personal care  ? If a patch or bandage has been placed, please leave this in place until seen by your physician. Ensure that the bandage does not get wet when you take a shower.     Tape on the lower lids for lower cosmetic blepharoplasty can get wet and it is safe to shower with the tape on the lower eyelids. The tape will be removed at one week. If it falls off, do not replace.     ? Apply ice compresses 15 minutes on 15 minutes off while awake for 2 days, then switch to warm water compresses 4 times a day until seen by your physician. For warm packs you can place a cup of dry uncooked rice in a clean cotton sock. Then place sock in microwave 30 seconds to one minute. Next place the warm sock into a plastic bag and wrap the bag with clean warm wet washcloth and place over operated eye.    ? You may shower or wash your hair the day after surgery. Do not bathe or go swimming for 1 week to prevent contamination of your wounds.  ? Do not apply make-up to the eyes or eyelids for 2 weeks after surgery.  ? Expect some swelling, bruising, black eye (even into the lower eyelids and cheeks). Also expect serum caking, crusting and discharge from the eye and/or incisions. A small amount of surface bleeding is normal for the first 48 hours. Swelling will typically worsen over the first 48-72 hours.  ? Your eye(s) and eyelid(s) may be painful and tender. This is normal after surgery.  Use the pain medication as prescribed. If your pain does not improve despite the  medication, contact the office.    Contact information and follow-up  ? Return to the Eye Clinic for a follow-up appointment with your physician as  scheduled. If no appointment has been scheduled, call [phone] for an  appointment with Dr. Sanchez within 1 to  2 weeks from your date of surgery.  ? For severe pain, bleeding, or loss of vision, call the Eye Clinic at [phone].  After hours or on weekends and holidays, call [phone] and ask to speak with  the ophthalmologist on call.    Activity restrictions and driving  ? Avoid heavy lifting, bending, exercise or strenuous activity for 1 week after surgery.  You may resume other activities and return to work as tolerated.  ? You may not resume driving until have you stopped using narcotic pain medications(such as Norco, Percocet, Tylenol #3).    Medications  ? Restart all your regular home medications and eye drops. If you take Plavix or  Aspirin on a regular basis, wait for 3 days after your surgery before restarting these  in order to decrease the risk of bleeding complications.  ? Avoid aspirin and aspirin-like medications (Motrin, Aleve, Ibuprofen, Roma-  Hecker etc) for 5 days to reduce the risk of bleeding. You may take Tylenol  (acetaminophen) for pain.  ? In addition to your home medications, take the following post-operative medications as prescribed by your physician.    ? Apply antibiotic ointment to all sutures three times a day, and into the operated eye(s) at night.  ? Instill eye drops 3 times a day until finished.  ? Take 1 to 2 pain pills as needed for pain up to every 4 hours.    ? WARNING: All the prescription pain medications listed above contain Tylenol  (acetaminophen). You must not take more than 4,000 mg of acetaminophen per  24-hour period. This is equivalent to 6 tablets of Darvocet, 8 tablets of Vicodin, or  12 tablets of Norco, Percocet or Tylenol #3. If you take other over-the-counter medications  containing acetaminophen, you must take the amount of acetaminophen into  account and reduce the number of prescribed pain pills accordingly.  ? The prescribed medications may make you drowsy. You must not drive a car,  operate heavy machinery or drink alcohol while taking them.  ? The  "prescribed pain medications may cause constipation and nausea. Take them  with some food to prevent a stomach upset. If you continue to experience nausea,  call your physician.      [facility]  Home Care Following Anesthesia  For 24 hours after surgery:  Get plenty of rest.  A responsible adult must stay with you for at least 24 hours after you leave the surgery center.  Do not drive or use heavy equipment.  If you have weakness or tingling, don't drive or use heavy equipment until this feeling goes away.   Do not drink alcohol.   Avoid strenuous or risky activities.  Ask for help when climbing stairs.  You may feel lightheaded.  IF so, sit for a few minutes before standing.  Have someone help you get up.   If you have nausea (feel sick to your stomach): Drink only clear liquids such as apple juice, ginger ale, broth or 7-Up.  Rest may also help.  Be sure to drink enough fluids.  Move to a regular diet as you feel able.   You may have a slight fever.  Call the doctor if your fever is over 100 F (37.7 C) (taken under the tongue) or lasts longer than 24 hours.  You may have a dry mouth, a sore throat, muscle aches or trouble sleeping. These should go away after 24 hours.  Do not make important or legal decisions.   It is recommended to avoid smoking.        Today you received a Marcaine or bupivacaine block to numb the nerves near your surgery site.  This is a block using local anesthetic or \"numbing\" medication injected around the nerves to anesthetize or \"numb\" the area supplied by those nerves.  This block is injected into the muscle layer near your surgical site.  The medication may numb the location where you had surgery for 6-18 hours, but may last up to 24 hours.  If your surgical site is an arm or leg you should be careful with your affected limb, since it is possible to injure your limb without being aware of it due to the numbing.  Until full feeling returns, you should " guard against bumping or hitting your limb, and avoid extreme hot or cold temperatures on the skin.  As the block wears off, the feeling will return as a tingling or prickly sensation near your surgical site.  You will experience more discomfort from your incision as the feeling returns.  You may want to take a pain pill (a narcotic or Tylenol if this was prescribed by your surgeon) when you start to experience mild pain before the pain beccomes more severe.  If your pain medications do not control your pain you should notifiy your surgeon.    Tips for taking pain medications  To get the best pain relief possible, remember these points:  Take pain medications as directed, before pain becomes severe.  Pain medication can upset your stomach: taking it with food may help.  Constipation is a common side effect of pain medication. Drink plenty of  fluids.  Eat foods high in fiber. Take a stool softener if recommended by your doctor or pharmacist.  Do not drink alcohol, drive or operate machinery while taking pain medications.  Ask about other ways to control pain, such as with heat, ice or relaxation.    Tylenol/Acetaminophen Consumption  To help encourage the safe use of acetaminophen, the makers of TYLENOL  have lowered the maximum daily dose for single-ingredient Extra Strength TYLENOL  (acetaminophen) products sold in the [place] from 8 pills per day (4,000 mg) to 6 pills per day (3,000 mg). The dosing interval has also changed from 2 pills every 4-6 hours to 2 pills every 6 hours.  If you feel your pain relief is insufficient, you may take Tylenol/Acetaminophen in addition to your narcotic pain medication.   Be careful not to exceed 3,000 mg of Tylenol/Acetaminophen in a 24 hour period from all sources.  If you are taking extra strength Tylenol/acetaminophen (500 mg), the maximum dose is 6 tablets in 24 hours.  If you are taking regular strength acetaminophen (325 mg), the maximum dose is 9 tablets in 24 hours.    Call  a doctor for any of the following:  Signs of infection (fever, growing tenderness at the surgery site, a large amount of drainage or bleeding, severe pain, foul-smelling drainage, redness, swelling).  It has been over 8 to 10 hours since surgery and you are still not able to urinate (pass water).  Headache for over 24 hours.  Numbness, tingling or weakness the day after surgery (if you had spinal anesthesia).  Signs of Covid-19 infection (temperature over 100 degrees, shortness of breath, cough, loss of taste/smell, generalized body aches, persistent headache, chills, sore throat, nausea/vomiting/diarrhea)  Your doctor is:  Dr. Jemal Sanchez, Ophthalmology: [phone]  Or dial [phone] and ask for the resident on call for:  Ophthalmology  For emergency care, call the:  [facility]:  [phone] (TTY for hearing impaired: [phone])

## 2022-05-06 NOTE — TELEPHONE ENCOUNTER
Spoke to pt at 1725    Pt states has couple norco left, but not enough thru weekend    Pt aware Rx was sent to [facility] pharmacy and will  tomorrow at [facility] pharmacy    -- pt aware not able to e-scribe and Dr. Sanchez does not have access to system to electronically send    Updated [facility] pharmacy pt will be picking up tomorrow    Deonte Castaneda RN 5:31 PM 05/06/22    ---        Ordered by Dr. Sanchez following lid surgery today    Spoke to [facility] pharmacy and pharmacy confirmed pt did not     Will try to reach out to Dr. Sanchez to see if able to assist in prescribing    Not able to e-scribe narcotic medications    Deonte Castaneda RN 4:10 PM 05/06/22        [facility] Call Center    Phone Message    May a detailed message be left on voicemail: yes     Reason for Call: Medication Refill Request    Has the patient contacted the pharmacy for the refill? Yes   Name of medication being requested: HYDROcodone-acetaminophen (NORCO) 5-325 MG tablet  Provider who prescribed the medication:Dr Sanchez  Pharmacy:[facility] [id number] [place] [address]  KNOB [phone]  Date medication is needed: ASAP     Pt needs medication sent to updated Pharmacy  Please send my chart message that has been sent      Action Taken: Message routed to:  [facility] ([facility]): Eye    Travel Screening: Not Applicable

## 2022-05-06 NOTE — ANESTHESIA POSTPROCEDURE EVALUATION
Patient: Nadira Perez    Procedure: Procedure(s):  UPPER BLEPHAROPLASTY, BILATERAL  BLEPHAROPLASTY, LOWER EYELID, BILATERAL, COSMETIC       Anesthesia Type:  General    Note:  Disposition: Outpatient   Postop Pain Control: Uneventful            Sign Out: Well controlled pain   PONV: No   Neuro/Psych: Uneventful            Sign Out: Acceptable/Baseline neuro status   Airway/Respiratory: Uneventful            Sign Out: Acceptable/Baseline resp. status   CV/Hemodynamics: Uneventful            Sign Out: Acceptable CV status; No obvious hypovolemia; No obvious fluid overload   Other NRE: NONE   DID A NON-ROUTINE EVENT OCCUR? No           Last vitals:  Vitals Value Taken Time   /92 05/06/22 0954   Temp 37  C (98.6  F) 05/06/22 0946   Pulse 76 05/06/22 0959   Resp 10 05/06/22 0959   SpO2 98 % 05/06/22 0959   Vitals shown include unvalidated device data.    Electronically Signed By: MARKEL GIL MD  May 6, 2022  1:07 PM

## 2022-05-06 NOTE — OP NOTE
PREOPERATIVE DIAGNOSES:   1. Bilateral upper eyelid dermatochalasis.  2. Bilateral lower eyelid steatochalasis and dermatochalasis.   POSTOPERATIVE DIAGNOSES:   1. Bilateral upper eyelid dermatochalasis.   2. Bilateral lower eyelid steatochalasis and dermatochalasis.   PROCEDURES:   1. Bilateral upper blepharoplasty.  2. Bilateral lower eyelid cosmetic blepharoplasty.   SURGEON: Jemal Sanchez MD  ANESTHESIA: General with local infiltration of  1% lidocaine with epinephrine.   COMPLICATIONS: None.   ESTIMATED BLOOD LOSS: Less than 5 mL.   HISTORY: Nadira Perez presented with upper lid drooping secondary to  dermatochalasis interfering with her vision. Nadira Perez also had lower lid dermatochalasis and steatochalasis and desired cosmetic correction. After risks, benefits and alternatives to the proposed procedure were explained, informed consent was obtained.   DESCRIPTION OF PROCEDURE: Nadira Perez  was brought to the operating room and placed supine on the operating table. General anesthesia was induced. The upper lid crease and excess upper eyelid skin was marked with a marking pen and the upper and lower eyelids infiltrated with local anesthetic.  The area was prepped and draped in the typical sterile fashion for oculoplastic surgery. Attention was directed to the left side. Lower lid was everted. Conjunctival incision was made with monopolar cautery. The Juan lid plate was used to protect the globe during this procedure. A Desmarres retractor was placed and dissection carried down through the lower lid retractors. The conjunctiva and lower lid retractors were tagged with a 4-0 silk suture to the drape superiorly. Dissection was carried over each fat pad. Each fat pad was identified and resected with monopolar cautery. Hemostasis was obtained. The inferior oblique muscle was identified and avoided. The silk suture was removed. Attention directed to the right lower lid where the same  procedure was performed. We then performed the pinch procedure on the skin of the right lower lid using the green fixation forcep. The skin was excised with the Ritika scissors.  Hemostasis was obtained with the high temperature cautery.  The skin was closed with running 6-0 plain fast-absorbing gut suture. Attention was directed to the left lower lid where the pinch excision was performed as well.  Attention was directed to the right upper lid. Skin was incised following marked lines. Skin flap was excised with a high temperature cautery. A row of cautery was placed in the orbicularis along the inferior incision line.   The orbicularis and septum were opened nasally. A small amount of the nasal fat pad was excised with the cautery. Hemostasis was obtained and the skin closed with running 6-0 plain gut suture. Attention was directed to the left side where the same procedure was performed. Erythromycin ointment was applied to the incision in the eyes. The patient tolerated the procedure well and left the operating room in stable condition.     YSABEL MONAE MD

## 2022-05-06 NOTE — ANESTHESIA PREPROCEDURE EVALUATION
Anesthesia Pre-Procedure Evaluation    Patient: Nadira Perez   MRN: [id number] : 1950        Procedure : Procedure(s):  UPPER BLEPHAROPLASTY, BILATERAL  BLEPHAROPLASTY, LOWER EYELID, BILATERAL, COSMETIC          Past Medical History:   Diagnosis Date     Arthritis      Bone disease      Breast cancer (H)      H/O kyphoplasty      Hearing problem      History of kidney stones      History of radiation therapy      Hyperlipemia      Hypertension      Hypopotassemia      Kidney problem      Lymph edema      Medullary sponge kidney      Osteopenia      PONV (postoperative nausea and vomiting)      Reduced vision      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     Thyroid disease       Past Surgical History:   Procedure Laterality Date     ABDOMEN SURGERY      ovarian cyst, mesh     ARTHRODESIS WRIST Right      ARTHRODESIS WRIST  2013    Procedure: ARTHRODESIS WRIST;  left wrist scaphoid excision, four bone fusion, iliac crest bone graft  ( Mac with block);  Surgeon: Av Mendez MD;  [facility]     BIOPSY      skin, vaginal     CATARACT IOL, RT/LT Right 2018     CATARACT IOL, RT/LT Left 2018     COLONOSCOPY  2013    Procedure: COMBINED COLONOSCOPY, SINGLE BIOPSY/POLYPECTOMY BY BIOPSY;  COLONOSCOPY;  Surgeon: Dom Alvarez MD;  Location:  GI     ESOPHAGOSCOPY, GASTROSCOPY, DUODENOSCOPY (EGD), COMBINED N/A 2016    Procedure: COMBINED ESOPHAGOSCOPY, GASTROSCOPY, DUODENOSCOPY (EGD);  Surgeon: Quinten Feliciano MD;  Location:  GI     EXTERNAL EAR SURGERY      right     EYE SURGERY      radial keratomy     FUSION, SPINE, INTERBODY, OBLIQUE ANTERIOR AND LUMBAR, 2 LEVELS, POST APPROACH, USING OTS N/A 2021    Procedure: Part 1: Oblique Anterior Interbody Fusion at Lumbar 5 to sacral 1 with use of Bone Morphogenic Protein,;  Surgeon: Serge Raimrez MD;  [facility]: [facility]     GRAFT BONE FROM ILIAC CREST  2013    Procedure: GRAFT BONE  FROM ILIAC CREST;  mac with block and local infilitration;  Surgeon: Av Mendez MD;  [facility]      BREATH HYDROGEN TEST N/A 10/14/2016    Procedure: HYDROGEN BREATH TEST;  Surgeon: Cheri Barron MD;  [facility]: [facility]     HERNIA REPAIR      umbilical age 18 mos.     HYSTERECTOMY TOTAL ABDOMINAL  05/03/2000     MASTECTOMY MODIFIED RADICAL Bilateral     bilateral; right breast prophylactic     OPTICAL TRACKING SYSTEM FUSION POSTERIOR SPINE LUMBAR N/A 11/18/2021    Procedure: Open Posterior Instrumented Spinal Fusion at Lumbar 5 to Sacral 1, with grimm aguayo osteotomy, use of O-Arm/Stealth;  Surgeon: Serge Ramirez MD;  [facility]     PARATHYROIDECTOMY  09/23/2004    R SUPERIOR     PARATHYROIDECTOMY  09/23/2004    parathyroid resection, subtotal     RELEASE CARPAL TUNNEL Right 12/2/2021    Procedure: RIGHT CARPAL TUNNEL RELEASE, RIGHT WRIST HARDWARE REMOVAL, RIGHT CARPAL BOSS EXCISION;  Surgeon: Rolan Conley MD;  Location: [facility] [place]     REMOVE HARDWARE HAND  09/24/2013    Procedure: REMOVE HARDWARE HAND;  Left Hand Screw Removal        RHINOPLASTY  1968     thyr proc skin closed cosmetic manner by subcuticular stitch  01/23/2009     THYROPLASTY  10/09/2009     TONSILLECTOMY  1977     WRIST SURGERY      wrist arthrodesis      Allergies   Allergen Reactions     Erythromycin Nausea     Penicillins Hives     Around age 6 - doesn't recall full reaction, mother told her it was hives.     Has tolerated cephalsporins.       Social History     Tobacco Use     Smoking status: Never Smoker     Smokeless tobacco: Never Used   Substance Use Topics     Alcohol use: Not Currently     Alcohol/week: 7.0 standard drinks     Types: 7 Glasses of wine per week     Comment: Rare to occasional      Wt Readings from Last 1 Encounters:   05/06/22 73.9 kg (163 lb)        Anesthesia Evaluation   Pt has had prior anesthetic. Type: General.    History of anesthetic complications  -  PONV.      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 She has dysphagia from this as well.     No intubation note from November surgery but no reported difficulties, patient reports she was told they used video laryngoscope    Hearing aids    (+) LOLY risk factors, snores loudly,  (-) tobacco use   Neurologic:     (+) peripheral neuropathy, - feet, hands.  (-) no seizures, no CVA and no TIA   Cardiovascular: Comment: Patient is s/p left axillary lymph node dissection and has lymphedema. She reports cannot have BP or IV on left arm.     (+) Dyslipidemia hypertension-----dysrhythmias, PVCs, Previous cardiac testing   Echo: Date: 2019 Results:  Interpretation Summary  Left ventricular function, chamber size, wall motion, and wall thickness are  normal.The EF is 60-65%.  Right ventricular function, chamber size, wall motion, and thickness are  normal.  No significant valvular abnormalities were noted.     Compared to the previous study from 6/29/2009, there has been no significant  change. PVCs observed during the study.    Stress Test: Date: Results:    ECG Reviewed: Date: 11/4/21 Results:  Sinus rhythm, LVH  Cath: Date: Results:      METS/Exercise Tolerance: 4 - Raking leaves, gardening    Hematologic:     (+) anemia,     Musculoskeletal: Comment: DDD  scoliosis  CTS on left   Knee pain    Osteoporosis    Patient uses a walker for balance    Recent fall with R knee pain and R foot fracture. Has boot.          GI/Hepatic:  - neg GI/hepatic ROS  (-) GERD   Renal/Genitourinary:       Endo: Comment: Impaired fasting glucose    (+) thyroid problem, hypothyroidism,     Psychiatric/Substance Use:  - neg psychiatric ROS     Infectious Disease:  - neg infectious disease ROS     Malignancy:   (+) Malignancy, History of Breast and Skin.Breast CA Remission status post Surgery, Chemo and Radiation.  Skin CA  Remission status post Surgery.        Other: Comment: dermatitis           Physical Exam    Airway        Mallampati: I   TM distance: > 3 FB   Neck ROM: full   Mouth opening: > 3 cm    Respiratory Devices and Support         Dental  no notable dental history         Cardiovascular       Comment: Small murmur     Rhythm and rate: regular and normal   (+) murmur       Pulmonary   pulmonary exam normal                OUTSIDE LABS:  CBC:   Lab Results   Component Value Date    WBC 7.0 01/12/2022    WBC 9.9 11/19/2021    HGB 11.8 01/12/2022    HGB 9.4 (L) 11/19/2021    HCT 38.7 01/12/2022    HCT 28.6 (L) 11/19/2021     01/12/2022     11/19/2021     BMP:   Lab Results   Component Value Date     01/12/2022     12/08/2021    POTASSIUM 3.9 04/15/2022    POTASSIUM 4.0 01/12/2022    CHLORIDE 108 01/12/2022    CHLORIDE 107 12/08/2021    CO2 27 01/12/2022    CO2 28 12/08/2021    BUN 19 01/12/2022    BUN 16 12/08/2021    CR 0.63 01/12/2022    CR 0.60 12/08/2021    GLC 96 01/12/2022    GLC 96 12/08/2021     COAGS:   Lab Results   Component Value Date    PTT 25 08/20/2010    INR 0.85 (L) 08/20/2010     POC:   Lab Results   Component Value Date    BGM 98 01/16/2008     HEPATIC:   Lab Results   Component Value Date    ALBUMIN 4.3 01/12/2022    PROTTOTAL 7.7 01/12/2022    ALT 24 01/12/2022    AST 16 01/12/2022    ALKPHOS 114 01/12/2022    BILITOTAL 0.4 01/12/2022     OTHER:   Lab Results   Component Value Date    PH 7.39 11/18/2021    A1C 5.9 (H) 04/15/2022    RAMBO 9.7 01/12/2022    PHOS 3.7 01/12/2022    MAG 2.6 (H) 01/12/2022    LIPASE 177 09/15/2016    TSH 1.54 01/12/2022    T4 1.05 11/21/2011    CRP <2.9 12/08/2021    SED 31 (H) 12/08/2021       Anesthesia Plan    ASA Status:  2   NPO Status:  NPO Appropriate    Anesthesia Type: General.     - Airway: LMA   Induction: Intravenous.   Maintenance: TIVA.        Consents    Anesthesia Plan(s) and associated risks, benefits, and realistic alternatives  discussed. Questions answered and patient/representative(s) expressed understanding.    - Discussed:     - Discussed with:  Patient         Postoperative Care    Pain management: IV analgesics, Multi-modal analgesia.   PONV prophylaxis: Background Propofol Infusion, Dexamethasone or Solumedrol, Ondansetron (or other 5HT-3)     Comments:                MARKEL GIL MD

## 2022-05-09 ENCOUNTER — TELEPHONE (OUTPATIENT)
Dept: OPHTHALMOLOGY | Facility: CLINIC | Age: 72
End: 2022-05-09
Payer: COMMERCIAL

## 2022-05-09 ENCOUNTER — MYC MEDICAL ADVICE (OUTPATIENT)
Dept: OPHTHALMOLOGY | Facility: CLINIC | Age: 72
End: 2022-05-09
Payer: COMMERCIAL

## 2022-05-09 DIAGNOSIS — Z98.890 POSTOPERATIVE EYE STATE: Primary | ICD-10-CM

## 2022-05-09 DIAGNOSIS — Z11.59 ENCOUNTER FOR SCREENING FOR OTHER VIRAL DISEASES: Primary | ICD-10-CM

## 2022-05-09 DIAGNOSIS — S69.92XA FINGER INJURY, LEFT, INITIAL ENCOUNTER: Primary | ICD-10-CM

## 2022-05-09 PROBLEM — H43.812 PVD (POSTERIOR VITREOUS DETACHMENT), LEFT EYE: Status: ACTIVE | Noted: 2022-05-09

## 2022-05-09 PROBLEM — H43.392 VITREOUS OPACITIES OF LEFT EYE: Status: ACTIVE | Noted: 2022-05-09

## 2022-05-09 RX ORDER — ERYTHROMYCIN 5 MG/G
OINTMENT OPHTHALMIC
Qty: 3.5 G | Refills: 0 | Status: SHIPPED | OUTPATIENT
Start: 2022-05-09 | End: 2022-07-16

## 2022-05-09 RX ORDER — NEOMYCIN POLYMYXIN B SULFATES AND DEXAMETHASONE 3.5; 10000; 1 MG/ML; [USP'U]/ML; MG/ML
1 SUSPENSION/ DROPS OPHTHALMIC 4 TIMES DAILY
Qty: 3 ML | Refills: 0 | Status: SHIPPED | OUTPATIENT
Start: 2022-05-09 | End: 2022-07-16

## 2022-05-09 NOTE — TELEPHONE ENCOUNTER
Patient is scheduled for surgery with Dr. Felix Pizano     Spoke with: Ismael     Date of Surgery: 22     Location: [facility]:  [address] [place] [address]     H&P was completed  in chart     COVID testin/19 CR Lab    Post Op scheduled on , and      Surgery packet was mailed      Additional comments: Advised RN will call 1 - 3 business days prior with arrival time and instructions. Informed patient they will need an adult  and responsible adult to stay with for 24 hours following surgery

## 2022-05-10 ENCOUNTER — OFFICE VISIT (OUTPATIENT)
Dept: ORTHOPEDICS | Facility: CLINIC | Age: 72
End: 2022-05-10
Payer: COMMERCIAL

## 2022-05-10 ENCOUNTER — ANCILLARY PROCEDURE (OUTPATIENT)
Dept: GENERAL RADIOLOGY | Facility: CLINIC | Age: 72
End: 2022-05-10
Attending: FAMILY MEDICINE
Payer: COMMERCIAL

## 2022-05-10 VITALS — HEIGHT: 67 IN | BODY MASS INDEX: 25.58 KG/M2 | WEIGHT: 163 LBS

## 2022-05-10 DIAGNOSIS — S62.635A CLOSED DISPLACED FRACTURE OF DISTAL PHALANX OF LEFT RING FINGER, INITIAL ENCOUNTER: ICD-10-CM

## 2022-05-10 DIAGNOSIS — S69.92XA FINGER INJURY, LEFT, INITIAL ENCOUNTER: ICD-10-CM

## 2022-05-10 DIAGNOSIS — S82.831D CLOSED FRACTURE OF DISTAL END OF RIGHT FIBULA WITH ROUTINE HEALING, UNSPECIFIED FRACTURE MORPHOLOGY, SUBSEQUENT ENCOUNTER: Primary | ICD-10-CM

## 2022-05-10 PROCEDURE — 99214 OFFICE O/P EST MOD 30 MIN: CPT | Performed by: FAMILY MEDICINE

## 2022-05-10 PROCEDURE — 73140 X-RAY EXAM OF FINGER(S): CPT | Mod: LT | Performed by: RADIOLOGY

## 2022-05-10 NOTE — LETTER
5/10/2022      RE: Nadira Perez  [address]  [facility] [address]     Dear Colleague,    Thank you for referring your patient, Nadira Perez, to the [facility] [place]. Please see a copy of my visit note below.    CHIEF COMPLAINT:  Pain of the Left Hand       HISTORY OF PRESENT ILLNESS  Ms. Perez is a pleasant 71 year old female who presents to clinic today with left finger pain.  Nadira was seen by Dr. Miguel on 5/4/22 after a fall earlier that day. They obtained XR of the right foot and ankle, right hand, left hand and right knee. She did sustain several fractures, but the left hand was negative for fracture. She continues to have left finger pain and requested to be seen today for further evaluation. Her pain over the left ring finger is over the DIP joint. She is ivanna slight flexed position. She also mentions concerns of her previous CAM tall boot she was provided.         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     Corneal opacity     Hypothyroidism     Osteopenia, unspecified location     Aromatase inhibitor use     Chronic pain of right knee     DDD (degenerative disc disease), lumbar     Degenerative scoliosis in adult patient     Peripheral neuropathy     Spinal stenosis of lumbar region with neurogenic claudication     Spondylolisthesis of lumbar region     Brain lesion     Dermatochalasis of  both upper eyelids     S/P lumbar fusion     Chronic bilateral low back pain     PVD (posterior vitreous detachment), left eye     Vitreous opacities of left eye       Current Outpatient Medications   Medication Sig Dispense Refill     acetaminophen (TYLENOL) 500 MG tablet Take 500-1,000 mg by mouth every 6 hours as needed for mild pain       amLODIPine (NORVASC) 10 MG tablet Take 1 tablet (10 mg) by mouth daily (Patient taking differently: Take 10 mg by mouth daily Takes around 10 AM.) 90 tablet 3     Ascorbic Acid (VITAMIN C) 500 MG CHEW Take 1 tablet by mouth daily       atorvastatin (LIPITOR) 80 MG tablet Take 1 tablet (80 mg) by mouth daily (Patient taking differently: Take 80 mg by mouth every evening) 90 tablet 3     bacitracin 500 UNIT/GM ophthalmic ointment Apply small amount to incision sites three times daily and apply to inner lower lid of operative eye(s) at bedtime, as directed per physician instructions. 3.5 g 0     Cholecalciferol (VITAMIN D3) 50 MCG (2000 UT) CAPS Take 6,000 Units by mouth daily (Patient taking differently: Take 6,000 Units by mouth daily (with lunch)) 270 capsule 3     CRANBERRY EXTRACT PO Take 650 mg by mouth daily ~10 am  Takes 1       diclofenac (VOLTAREN) 1 % topical gel APPLY 4 GRAMS TO KNEES OR 2 GRAMS TO HANDS FOUR TIMES DAILY USING ENCLOSED DOSING CARD. (Patient taking differently: Apply 4 grams to knees or 2 grams to hands four times daily as needed using enclosed dosing card.) 100 g 3     erythromycin (ROMYCIN) 5 MG/GM ophthalmic ointment Apply small amount to incision sites three times daily, then apply to inner lower lid of operative eye(s) at bedtime, as directed. 3.5 g 0     gabapentin (NEURONTIN) 300 MG capsule Take 3 capsules (900 mg) by mouth 3 times daily 270 capsule 11     [START ON 5/13/2022] HYDROcodone-acetaminophen (NORCO) 5-325 MG tablet Take 1 tablet by mouth every 6 hours as needed for severe pain Uses about 10 tablets per month. 30 tablet 0      hydrOXYzine (ATARAX) 10 MG tablet Take 1 tablet (10 mg) by mouth 2 times daily as needed for itching (pain adjuvant) 30 tablet 0     hydrOXYzine (VISTARIL) 25 MG capsule Take 1 capsule (25 mg) by mouth At Bedtime 30 capsule 0     ibuprofen (ADVIL/MOTRIN) 600 MG tablet Take 1 tablet (600 mg) by mouth every 6 hours as needed for other (mild and/or inflammatory pain) 30 tablet 0     levothyroxine (SYNTHROID/LEVOTHROID) 112 MCG tablet TAKE 1/2 TABLET BY MOUTH DAILY 45 tablet 3     lisinopril (ZESTRIL) 40 MG tablet Take 1 tablet (40 mg) by mouth daily 90 tablet 3     melatonin 5 MG tablet Take 10 mg by mouth At Bedtime        methocarbamol (ROBAXIN) 500 MG tablet Take 1 tablet (500 mg) by mouth 3 times daily 40 tablet 0     metoprolol succinate ER (TOPROL-XL) 50 MG 24 hr tablet Take 1 tablet (50 mg) by mouth daily (Patient taking differently: Take 50 mg by mouth every morning) 90 tablet 3     Multiple Vitamin (MULTI-VITAMIN) per tablet Take 1 tablet by mouth daily       naloxone (NARCAN) 4 MG/0.1ML nasal spray Spray 1 spray (4 mg) into one nostril alternating nostrils once as needed for opioid reversal every 2-3 minutes until assistance arrives 0.2 mL 0     neomycin-polymixin-dexamethasone (MAXITROL) 0.1 % ophthalmic suspension Place 1 drop into both eyes 4 times daily 3 mL 0     neomycin-polymyxin-dexamethasone (MAXITROL) 3.5-10931-9.1 SUSP ophthalmic susp Place 1-2 drops into both eyes 4 times daily for 10 days 4 mL 0     ondansetron (ZOFRAN-ODT) 4 MG ODT tab Take 1 tablet (4 mg) by mouth every 12 hours as needed for nausea 180 tablet 3     senna-docusate (SENOKOT-S/PERICOLACE) 8.6-50 MG tablet Take 2 tablets by mouth 2 times daily (Patient taking differently: Take 1-2 tablets by mouth 2 times daily as needed) 30 tablet 0     spironolactone (ALDACTONE) 25 MG tablet Take 2 tablets (50 mg) by mouth daily 90 tablet 3     triamcinolone (KENALOG) 0.1 % external ointment Apply topically 2 times daily (Patient taking  differently: Apply topically 2 times daily as needed) 30 g 11     Vaginal Lubricant (REPHRESH) GEL Place 2 g vaginally every 3 days 2 g 3       Allergies   Allergen Reactions     Erythromycin Nausea     Penicillins Hives     Around age 6 - doesn't recall full reaction, mother told her it was hives.     Has tolerated cephalsporins.        Family History   Problem Relation Age of Onset     Neurologic Disorder Mother         Anuerysm of Cerebral Artery, Dementia     Diabetes Mother      Thyroid Disease Mother         ,     Cerebrovascular Disease Mother      Dementia Mother      Osteoporosis Mother      Heart Disease Father         AAA     Hypertension Father      Circulatory Brother         Perihperal Neurophathy     Diabetes Maternal Grandmother      Asthma Maternal Grandmother      Chronic Obstructive Pulmonary Disease Maternal Grandfather         father     Asthma Maternal Grandfather      Diabetes Maternal Aunt         x2     Melanoma Maternal Aunt      Glaucoma Maternal Aunt      Breast Cancer Cousin      Dementia Other      Cancer Other         malignant melanoma     Hypertension Other      Hypertension Other      Cerebrovascular Disease Other      Cerebrovascular Disease Other      Obesity Other      Respiratory Other      Cancer Other      Diabetes Other      Asthma Other      Macular Degeneration No family hx of      Coronary Artery Disease No family hx of      Hyperlipidemia No family hx of      Kidney Disease No family hx of      Thrombosis No family hx of      Arthritis No family hx of      Depression No family hx of      Mental Illness No family hx of      Substance Abuse No family hx of      Cystic Fibrosis No family hx of      Early Death No family hx of      Coronary Artery Disease Early Onset No family hx of      Heart Failure No family hx of      Bleeding Diathesis No family hx of      Ovarian Cancer No family hx of      Uterine Cancer No family hx of      Prostate Cancer No family hx of       "Colorectal Cancer No family hx of      Pancreatic Cancer No family hx of      Lung Cancer No family hx of      Other Cancer No family hx of      Autoimmune Disease No family hx of      Unknown/Adopted No family hx of      Genetic Disorder No family hx of      Bleeding Disorder No family hx of      Clotting Disorder No family hx of      Anesthesia Reaction No family hx of        Additional medical/Social/Surgical histories reviewed in [facility] and updated as appropriate.        PHYSICAL EXAM  Ht 1.702 m (5' 7\")   Wt 73.9 kg (163 lb)   BMI 25.53 kg/m    General  - normal appearance, in no obvious distress  HEENT  - conjunctivae not injected, moist mucous membranes  CV  - normal radial pulse  Pulm  - normal respiratory pattern, non-labored  Musculoskeletal - left ring finger  - inspection: swelling at DIP  - palpation: tender dorsal DIP  - ROM:  MCP 90 deg flexion   0 deg extension    deg flexion   0 deg extension   DIP 80 deg flexion   0 deg extension  - strength: 5/5  strength  Neuro  - no numbness, no motor deficit, grossly normal coordination, normal muscle tone  Skin  - no ecchymosis, erythema, warmth, or induration, no obvious rash  Psych  - interactive, appropriate, normal mood and affect               ASSESSMENT & PLAN  Ms. Perez is a 71 year old female who presents to clinic today with pain in her left ring finger as well as bruising in her right lower leg after a fracture.    I ordered and independently reviewed an x-ray of her left hand with attention to her ring finger, this does reveal a nondisplaced, obliquely oriented fracture of the dorsal aspect of the distal phalanx of the left ring finger.  This was not present on her previous x-ray.    We did place her in a stack splint for this, I anticipate 3 to 4 weeks treatment time, in total.    With regards to her ankle given the bruising that the boot may have caused I do think it is reasonable to remain out of her boot and into a well fitting shoe, " along with a ankle brace, we did provide her a brace today.    She is following with Dr. Miguel next week.  Up    It was a pleasure seeing Nadira today.    Willis Morgan DO, [facility]  Primary Care Sports Medicine      This note was constructed using Dragon dictation software, please excuse any minor errors in spelling, grammar, or syntax.

## 2022-05-10 NOTE — PROGRESS NOTES
CHIEF COMPLAINT:  Pain of the Left Hand       HISTORY OF PRESENT ILLNESS  Ms. Perez is a pleasant 71 year old female who presents to clinic today with left finger pain.  Nadira was seen by Dr. Miguel on 5/4/22 after a fall earlier that day. They obtained XR of the right foot and ankle, right hand, left hand and right knee. She did sustain several fractures, but the left hand was negative for fracture. She continues to have left finger pain and requested to be seen today for further evaluation. Her pain over the left ring finger is over the DIP joint. She is ivanna slight flexed position. She also mentions concerns of her previous CAM tall boot she was provided.         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     Corneal opacity     Hypothyroidism     Osteopenia, unspecified location     Aromatase inhibitor use     Chronic pain of right knee     DDD (degenerative disc disease), lumbar     Degenerative scoliosis in adult patient     Peripheral neuropathy     Spinal stenosis of lumbar region with neurogenic claudication     Spondylolisthesis of lumbar region     Brain lesion     Dermatochalasis of both upper eyelids     S/P lumbar fusion     Chronic bilateral low back pain     PVD (posterior vitreous detachment), left eye     Vitreous opacities of left eye       Current Outpatient Medications   Medication Sig Dispense Refill     acetaminophen (TYLENOL) 500 MG  tablet Take 500-1,000 mg by mouth every 6 hours as needed for mild pain       amLODIPine (NORVASC) 10 MG tablet Take 1 tablet (10 mg) by mouth daily (Patient taking differently: Take 10 mg by mouth daily Takes around 10 AM.) 90 tablet 3     Ascorbic Acid (VITAMIN C) 500 MG CHEW Take 1 tablet by mouth daily       atorvastatin (LIPITOR) 80 MG tablet Take 1 tablet (80 mg) by mouth daily (Patient taking differently: Take 80 mg by mouth every evening) 90 tablet 3     bacitracin 500 UNIT/GM ophthalmic ointment Apply small amount to incision sites three times daily and apply to inner lower lid of operative eye(s) at bedtime, as directed per physician instructions. 3.5 g 0     Cholecalciferol (VITAMIN D3) 50 MCG (2000 UT) CAPS Take 6,000 Units by mouth daily (Patient taking differently: Take 6,000 Units by mouth daily (with lunch)) 270 capsule 3     CRANBERRY EXTRACT PO Take 650 mg by mouth daily ~10 am  Takes 1       diclofenac (VOLTAREN) 1 % topical gel APPLY 4 GRAMS TO KNEES OR 2 GRAMS TO HANDS FOUR TIMES DAILY USING ENCLOSED DOSING CARD. (Patient taking differently: Apply 4 grams to knees or 2 grams to hands four times daily as needed using enclosed dosing card.) 100 g 3     erythromycin (ROMYCIN) 5 MG/GM ophthalmic ointment Apply small amount to incision sites three times daily, then apply to inner lower lid of operative eye(s) at bedtime, as directed. 3.5 g 0     gabapentin (NEURONTIN) 300 MG capsule Take 3 capsules (900 mg) by mouth 3 times daily 270 capsule 11     [START ON 5/13/2022] HYDROcodone-acetaminophen (NORCO) 5-325 MG tablet Take 1 tablet by mouth every 6 hours as needed for severe pain Uses about 10 tablets per month. 30 tablet 0     hydrOXYzine (ATARAX) 10 MG tablet Take 1 tablet (10 mg) by mouth 2 times daily as needed for itching (pain adjuvant) 30 tablet 0     hydrOXYzine (VISTARIL) 25 MG capsule Take 1 capsule (25 mg) by mouth At Bedtime 30 capsule 0     ibuprofen (ADVIL/MOTRIN) 600 MG tablet Take 1  tablet (600 mg) by mouth every 6 hours as needed for other (mild and/or inflammatory pain) 30 tablet 0     levothyroxine (SYNTHROID/LEVOTHROID) 112 MCG tablet TAKE 1/2 TABLET BY MOUTH DAILY 45 tablet 3     lisinopril (ZESTRIL) 40 MG tablet Take 1 tablet (40 mg) by mouth daily 90 tablet 3     melatonin 5 MG tablet Take 10 mg by mouth At Bedtime        methocarbamol (ROBAXIN) 500 MG tablet Take 1 tablet (500 mg) by mouth 3 times daily 40 tablet 0     metoprolol succinate ER (TOPROL-XL) 50 MG 24 hr tablet Take 1 tablet (50 mg) by mouth daily (Patient taking differently: Take 50 mg by mouth every morning) 90 tablet 3     Multiple Vitamin (MULTI-VITAMIN) per tablet Take 1 tablet by mouth daily       naloxone (NARCAN) 4 MG/0.1ML nasal spray Spray 1 spray (4 mg) into one nostril alternating nostrils once as needed for opioid reversal every 2-3 minutes until assistance arrives 0.2 mL 0     neomycin-polymixin-dexamethasone (MAXITROL) 0.1 % ophthalmic suspension Place 1 drop into both eyes 4 times daily 3 mL 0     neomycin-polymyxin-dexamethasone (MAXITROL) 3.5-61732-8.1 SUSP ophthalmic susp Place 1-2 drops into both eyes 4 times daily for 10 days 4 mL 0     ondansetron (ZOFRAN-ODT) 4 MG ODT tab Take 1 tablet (4 mg) by mouth every 12 hours as needed for nausea 180 tablet 3     senna-docusate (SENOKOT-S/PERICOLACE) 8.6-50 MG tablet Take 2 tablets by mouth 2 times daily (Patient taking differently: Take 1-2 tablets by mouth 2 times daily as needed) 30 tablet 0     spironolactone (ALDACTONE) 25 MG tablet Take 2 tablets (50 mg) by mouth daily 90 tablet 3     triamcinolone (KENALOG) 0.1 % external ointment Apply topically 2 times daily (Patient taking differently: Apply topically 2 times daily as needed) 30 g 11     Vaginal Lubricant (REPHRESH) GEL Place 2 g vaginally every 3 days 2 g 3       Allergies   Allergen Reactions     Erythromycin Nausea     Penicillins Hives     Around age 6 - doesn't recall full reaction, mother told  her it was hives.     Has tolerated cephalsporins.        Family History   Problem Relation Age of Onset     Neurologic Disorder Mother         Anuerysm of Cerebral Artery, Dementia     Diabetes Mother      Thyroid Disease Mother         ,     Cerebrovascular Disease Mother      Dementia Mother      Osteoporosis Mother      Heart Disease Father         AAA     Hypertension Father      Circulatory Brother         Perihperal Neurophathy     Diabetes Maternal Grandmother      Asthma Maternal Grandmother      Chronic Obstructive Pulmonary Disease Maternal Grandfather         father     Asthma Maternal Grandfather      Diabetes Maternal Aunt         x2     Melanoma Maternal Aunt      Glaucoma Maternal Aunt      Breast Cancer Cousin      Dementia Other      Cancer Other         malignant melanoma     Hypertension Other      Hypertension Other      Cerebrovascular Disease Other      Cerebrovascular Disease Other      Obesity Other      Respiratory Other      Cancer Other      Diabetes Other      Asthma Other      Macular Degeneration No family hx of      Coronary Artery Disease No family hx of      Hyperlipidemia No family hx of      Kidney Disease No family hx of      Thrombosis No family hx of      Arthritis No family hx of      Depression No family hx of      Mental Illness No family hx of      Substance Abuse No family hx of      Cystic Fibrosis No family hx of      Early Death No family hx of      Coronary Artery Disease Early Onset No family hx of      Heart Failure No family hx of      Bleeding Diathesis No family hx of      Ovarian Cancer No family hx of      Uterine Cancer No family hx of      Prostate Cancer No family hx of      Colorectal Cancer No family hx of      Pancreatic Cancer No family hx of      Lung Cancer No family hx of      Other Cancer No family hx of      Autoimmune Disease No family hx of      Unknown/Adopted No family hx of      Genetic Disorder No family hx of      Bleeding Disorder No family  "hx of      Clotting Disorder No family hx of      Anesthesia Reaction No family hx of        Additional medical/Social/Surgical histories reviewed in [facility] and updated as appropriate.        PHYSICAL EXAM  Ht 1.702 m (5' 7\")   Wt 73.9 kg (163 lb)   BMI 25.53 kg/m    General  - normal appearance, in no obvious distress  HEENT  - conjunctivae not injected, moist mucous membranes  CV  - normal radial pulse  Pulm  - normal respiratory pattern, non-labored  Musculoskeletal - left ring finger  - inspection: swelling at DIP  - palpation: tender dorsal DIP  - ROM:  MCP 90 deg flexion   0 deg extension    deg flexion   0 deg extension   DIP 80 deg flexion   0 deg extension  - strength: 5/5  strength  Neuro  - no numbness, no motor deficit, grossly normal coordination, normal muscle tone  Skin  - no ecchymosis, erythema, warmth, or induration, no obvious rash  Psych  - interactive, appropriate, normal mood and affect               ASSESSMENT & PLAN  Ms. Perez is a 71 year old female who presents to clinic today with pain in her left ring finger as well as bruising in her right lower leg after a fracture.    I ordered and independently reviewed an x-ray of her left hand with attention to her ring finger, this does reveal a nondisplaced, obliquely oriented fracture of the dorsal aspect of the distal phalanx of the left ring finger.  This was not present on her previous x-ray.    We did place her in a stack splint for this, I anticipate 3 to 4 weeks treatment time, in total.    With regards to her ankle given the bruising that the boot may have caused I do think it is reasonable to remain out of her boot and into a well fitting shoe, along with a ankle brace, we did provide her a brace today.    She is following with Dr. Miguel next week.  Up    It was a pleasure seeing Nadira today.    Willis Morgan DO, CAM  Primary Care Sports Medicine      This note was constructed using Dragon dictation software, please " excuse any minor errors in spelling, grammar, or syntax.

## 2022-05-10 NOTE — PROGRESS NOTES
Spine Surgery Return Clinic Visit      Chief Complaint:   RECHECK (3 month follow up, patient stated that she is having pain when she stands for more than 10 minutes, she believes she is having muscle pain more so, she states that when she stretches )      Interval HPI:  Symptom Profile Including: location of symptoms, onset, severity, exacerbating/alleviating factors, previous treatments:        Nadira Perez is a 71 year old female who returns today for clinical follow-up.  She has some mild back pain.  Nothing down the legs at this time.  We did have some concern over a possible fracture in the perioperative period, but she seems to be recovering quite well, requiring minimal assistance for ambulation.  She does have some back pain, but overall feels like things are improving.            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     Thyroid disease             Past Surgical History:     Past Surgical History:   Procedure Laterality Date     ABDOMEN SURGERY      ovarian cyst, mesh     ARTHRODESIS WRIST Right      ARTHRODESIS WRIST  02/14/2013    Procedure: ARTHRODESIS WRIST;  left wrist scaphoid excision, four bone fusion, iliac crest bone graft  ( Mac with block);  Surgeon: Av Mendez MD;  [facility]     BIOPSY      skin, vaginal     BLEPHAROPLASTY BILATERAL Bilateral 5/6/2022    Procedure: UPPER BLEPHAROPLASTY, BILATERAL;  Surgeon: Jemal Sanchez MD;  [place] [facility] [place] [place]     CATARACT IOL, RT/LT Right 03/13/2018     CATARACT IOL, RT/LT Left 02/20/2018      COLONOSCOPY  12/24/2013    Procedure: COMBINED COLONOSCOPY, SINGLE BIOPSY/POLYPECTOMY BY BIOPSY;  COLONOSCOPY;  Surgeon: Dom Alvarez MD;  Location:  GI     COSMETIC BLEPHAROPLASTY LOWER LIDS BILATERAL Bilateral 5/6/2022    Procedure: BLEPHAROPLASTY, LOWER EYELID, BILATERAL, COSMETIC;  Surgeon: Jemal Sanchez MD;  Location: [facility] OR     ESOPHAGOSCOPY, GASTROSCOPY, DUODENOSCOPY (EGD), COMBINED N/A 11/23/2016    Procedure: COMBINED ESOPHAGOSCOPY, GASTROSCOPY, DUODENOSCOPY (EGD);  Surgeon: Quinten Feliciano MD;  Location:  GI     EXTERNAL EAR SURGERY      right     EYE SURGERY      radial keratomy     FUSION, SPINE, INTERBODY, OBLIQUE ANTERIOR AND LUMBAR, 2 LEVELS, POST APPROACH, USING OTS N/A 11/18/2021    Procedure: Part 1: Oblique Anterior Interbody Fusion at Lumbar 5 to sacral 1 with use of Bone Morphogenic Protein,;  Surgeon: Serge Ramirez MD;  [facility]: [facility]     GRAFT BONE FROM ILIAC CREST  02/14/2013    Procedure: GRAFT BONE FROM ILIAC CREST;  mac with block and local infilitration;  Surgeon: Av Mendez MD;  [facility]     HC BREATH HYDROGEN TEST N/A 10/14/2016    Procedure: HYDROGEN BREATH TEST;  Surgeon: Cheri Barron MD;  Location:  GI     HERNIA REPAIR      umbilical age 18 mos.     HYSTERECTOMY TOTAL ABDOMINAL  05/03/2000     MASTECTOMY MODIFIED RADICAL Bilateral     bilateral; right breast prophylactic     OPTICAL TRACKING SYSTEM FUSION POSTERIOR SPINE LUMBAR N/A 11/18/2021    Procedure: Open Posterior Instrumented Spinal Fusion at Lumbar 5 to Sacral 1, with grimm aguayo osteotomy, use of O-Arm/Stealth;  Surgeon: Serge Ramirez MD;  Location: [facility]     PARATHYROIDECTOMY  09/23/2004    R SUPERIOR     PARATHYROIDECTOMY  09/23/2004    parathyroid resection, subtotal     RELEASE CARPAL TUNNEL Right 12/2/2021    Procedure: RIGHT CARPAL TUNNEL RELEASE, RIGHT WRIST HARDWARE REMOVAL, RIGHT CARPAL BOSS EXCISION;  Surgeon: Rolan Conley MD;   Location: [facility] OR     REMOVE HARDWARE HAND  09/24/2013    Procedure: REMOVE HARDWARE HAND;  Left Hand Screw Removal        RHINOPLASTY  1968     thyr proc skin closed cosmetic manner by subcuticular stitch  01/23/2009     THYROPLASTY  10/09/2009     TONSILLECTOMY  1977     WRIST SURGERY      wrist arthrodesis            Social History:     Social History     Tobacco Use     Smoking status: Never Smoker     Smokeless tobacco: Never Used   Substance Use Topics     Alcohol use: Not Currently     Alcohol/week: 7.0 standard drinks     Types: 7 Glasses of wine per week     Comment: Rare to occasional            Family History:     Family History   Problem Relation Age of Onset     Neurologic Disorder Mother         Anuerysm of Cerebral Artery, Dementia     Diabetes Mother      Thyroid Disease Mother         ,     Cerebrovascular Disease Mother      Dementia Mother      Osteoporosis Mother      Heart Disease Father         AAA     Hypertension Father      Circulatory Brother         Perihperal Neurophathy     Diabetes Maternal Grandmother      Asthma Maternal Grandmother      Chronic Obstructive Pulmonary Disease Maternal Grandfather         father     Asthma Maternal Grandfather      Diabetes Maternal Aunt         x2     Melanoma Maternal Aunt      Glaucoma Maternal Aunt      Breast Cancer Cousin      Dementia Other      Cancer Other         malignant melanoma     Hypertension Other      Hypertension Other      Cerebrovascular Disease Other      Cerebrovascular Disease Other      Obesity Other      Respiratory Other      Cancer Other      Diabetes Other      Asthma Other      Macular Degeneration No family hx of      Coronary Artery Disease No family hx of      Hyperlipidemia No family hx of      Kidney Disease No family hx of      Thrombosis No family hx of      Arthritis No family hx of      Depression No family hx of      Mental Illness No family hx of      Substance Abuse No family hx of      Cystic Fibrosis No family  hx of      Early Death No family hx of      Coronary Artery Disease Early Onset No family hx of      Heart Failure No family hx of      Bleeding Diathesis No family hx of      Ovarian Cancer No family hx of      Uterine Cancer No family hx of      Prostate Cancer No family hx of      Colorectal Cancer No family hx of      Pancreatic Cancer No family hx of      Lung Cancer No family hx of      Other Cancer No family hx of      Autoimmune Disease No family hx of      Unknown/Adopted No family hx of      Genetic Disorder No family hx of      Bleeding Disorder No family hx of      Clotting Disorder No family hx of      Anesthesia Reaction No family hx of             Allergies:     Allergies   Allergen Reactions     Erythromycin Nausea     Penicillins Hives     Around age 6 - doesn't recall full reaction, mother told her it was hives.     Has tolerated cephalsporins.             Medications:     Current Outpatient Medications   Medication     acetaminophen (TYLENOL) 500 MG tablet     amLODIPine (NORVASC) 10 MG tablet     Ascorbic Acid (VITAMIN C) 500 MG CHEW     atorvastatin (LIPITOR) 80 MG tablet     bacitracin 500 UNIT/GM ophthalmic ointment     Cholecalciferol (VITAMIN D3) 50 MCG (2000 UT) CAPS     CRANBERRY EXTRACT PO     diclofenac (VOLTAREN) 1 % topical gel     erythromycin (ROMYCIN) 5 MG/GM ophthalmic ointment     gabapentin (NEURONTIN) 300 MG capsule     [START ON 5/13/2022] HYDROcodone-acetaminophen (NORCO) 5-325 MG tablet     hydrOXYzine (ATARAX) 10 MG tablet     hydrOXYzine (VISTARIL) 25 MG capsule     ibuprofen (ADVIL/MOTRIN) 600 MG tablet     levothyroxine (SYNTHROID/LEVOTHROID) 112 MCG tablet     lisinopril (ZESTRIL) 40 MG tablet     melatonin 5 MG tablet     methocarbamol (ROBAXIN) 500 MG tablet     metoprolol succinate ER (TOPROL-XL) 50 MG 24 hr tablet     Multiple Vitamin (MULTI-VITAMIN) per tablet     naloxone (NARCAN) 4 MG/0.1ML nasal spray     neomycin-polymixin-dexamethasone (MAXITROL) 0.1 %  ophthalmic suspension     neomycin-polymyxin-dexamethasone (MAXITROL) 3.5-88737-2.1 SUSP ophthalmic susp     ondansetron (ZOFRAN-ODT) 4 MG ODT tab     senna-docusate (SENOKOT-S/PERICOLACE) 8.6-50 MG tablet     spironolactone (ALDACTONE) 25 MG tablet     triamcinolone (KENALOG) 0.1 % external ointment     Vaginal Lubricant (REPHRESH) GEL     Current Facility-Administered Medications   Medication     lidocaine 1 % injection 1 mL     lidocaine 1 % injection 1 mL     triamcinolone (KENALOG-40) injection 40 mg     triamcinolone (KENALOG-40) injection 40 mg             Review of Systems:   A focused musculoskeletal and neurologic ROS was performed with pertinent positives and negatives noted in the HPI.  Additional systems were also reviewed and are documented at the bottom of the note.         Physical Exam:   Vitals: There were no vitals taken for this visit.  Musculoskeletal, Neurologic, and Spine:          Lumbar Spine:    Appearance - No gross stepoffs or deformities    Motor -     L2-3: Hip flexion 5/5 R and 5/5 L strength          L3/4:  Knee extension R 5/5 and L 5/5 strength         L4/5:  Foot dorsiflexion R 5/5 L 5/5 and               S1:  Plantarflexion/Peroneal Muscles  R 5/5 and L 5/5 strength    Sensation: intact to light touch L3-S1 distribution BLE              Imaging:   We ordered and independently reviewed new radiographs at this clinic visit. The results were discussed with the patient. Findings include:     Radiographs show stable position of the implants       Assessment and Plan:     71 year old female with improving recovery after L5-S1 interbody fusion.  She should have a CT scan at the 1 year follow-up.  May continue to advance with unlimited low impact cardio.  Last follow-up will be with physician assistant and I encourage patient to otherwise reach out to me with any questions or concerns           Respectfully,  Serge Rmairez MD  Spine Surgery  [facility]    Answers for  HPI/ROS submitted by the patient on 5/2/2022  General Symptoms: No  Skin Symptoms: No  HENT Symptoms: Yes  EYE SYMPTOMS: Yes  HEART SYMPTOMS: Yes  LUNG SYMPTOMS: Yes  INTESTINAL SYMPTOMS: No  URINARY SYMPTOMS: Yes  GYNECOLOGIC SYMPTOMS: Yes  BREAST SYMPTOMS: No  SKELETAL SYMPTOMS: Yes  BLOOD SYMPTOMS: No  NERVOUS SYSTEM SYMPTOMS: Yes  MENTAL HEALTH SYMPTOMS: Yes  Ear pain: No  Ear discharge: No  Hearing loss: Yes  Tinnitus: No  Nosebleeds: No  Congestion: No  Sinus pain: No  Trouble swallowing: No   Voice hoarseness: No  Mouth sores: Yes  Sore throat: No  Tooth pain: No  Gum tenderness: No  Bleeding gums: No  Change in taste: No  Change in sense of smell: No  Dry mouth: Yes  Hearing aid used: Yes  Neck lump: No  Eye pain: No  Vision loss: No  Dry eyes: Yes  Watery eyes: No  Eye bulging: No  Double vision: No  Flashing of lights: No  Spots: No  Floaters: Yes  Redness: No  Crossed eyes: No  Tunnel Vision: No  Yellowing of eyes: No  Eye irritation: No  Chest pain or pressure: No  Fast or irregular heartbeat: Yes  Pain in legs with walking: No  Trouble breathing while lying down: No  Fingers or toes appear blue: No  High blood pressure: Yes  Low blood pressure: No  Fainting: No  Murmurs: Yes  Pacemaker: No  Varicose veins: Yes  Edema or swelling: Yes  Wake up at night with shortness of breath: No  Light-headedness: Yes  Exercise intolerance: Yes  Cough: No  Sputum or phlegm: No  Coughing up blood: No  Difficulty breating or shortness of breath: No  Snoring: Yes  Wheezing: No  Difficulty breathing on exertion: No  Nighttime Cough: No  Difficulty breathing when lying flat: No  Trouble holding urine or incontinence: Yes  Pain or burning: No  Trouble starting or stopping: Yes  Increased frequency of urination: Yes  Blood in urine: No  Decreased frequency of urination: No  Frequent nighttime urination: Yes  Flank pain: No  Difficulty emptying bladder: Yes  Bleeding or spotting between periods: No  Heavy or painful periods:  No  Irregular periods: No  Vaginal discharge: No  Hot flashes: No  Vaginal dryness: Yes  Genital ulcers: No  Reduced libido: Yes  Painful intercourse: Yes  Difficulty with sexual arousal: Yes  Post-menopausal bleeding: No  Back pain: Yes  Muscle aches: Yes  Neck pain: Yes  Swollen joints: Yes  Joint pain: Yes  Bone pain: Yes  Muscle cramps: No  Joint stiffness: Yes  Bone fracture: Yes  Trouble with coordination: No  Dizziness or trouble with balance: Yes  Fainting or black-out spells: No  Memory loss: No  Headache: No  Seizures: No  Speech problems: No  Tingling: Yes  Tremor: No  Weakness: Yes  Difficulty walking: Yes  Paralysis: No  Numbness: Yes  Nervous or Anxious: No  Depression: No  Trouble sleeping: Yes  Trouble thinking or concentrating: No  Mood changes: No  Panic attacks: No

## 2022-05-13 ENCOUNTER — PATIENT OUTREACH (OUTPATIENT)
Dept: SURGERY | Facility: CLINIC | Age: 72
End: 2022-05-13
Payer: COMMERCIAL

## 2022-05-13 NOTE — PROGRESS NOTES
Patient Telephone Reminder Call    Date of call:  05/13/22  Phone numbers:  Cell number on file:    Telephone Information:   Mobile [phone]       Reached patient/confirmed appointment:  Yes  Appointment with:   Dr. Richard Encarnacion  Reason for visit:  Hernia

## 2022-05-16 ENCOUNTER — OFFICE VISIT (OUTPATIENT)
Dept: ORTHOPEDICS | Facility: CLINIC | Age: 72
End: 2022-05-16
Payer: COMMERCIAL

## 2022-05-16 ENCOUNTER — OFFICE VISIT (OUTPATIENT)
Dept: SURGERY | Facility: CLINIC | Age: 72
End: 2022-05-16
Payer: COMMERCIAL

## 2022-05-16 ENCOUNTER — OFFICE VISIT (OUTPATIENT)
Dept: OPHTHALMOLOGY | Facility: CLINIC | Age: 72
End: 2022-05-16
Payer: COMMERCIAL

## 2022-05-16 ENCOUNTER — TELEPHONE (OUTPATIENT)
Dept: AUDIOLOGY | Facility: CLINIC | Age: 72
End: 2022-05-16

## 2022-05-16 VITALS — DIASTOLIC BLOOD PRESSURE: 80 MMHG | HEART RATE: 89 BPM | SYSTOLIC BLOOD PRESSURE: 131 MMHG | OXYGEN SATURATION: 98 %

## 2022-05-16 VITALS — HEIGHT: 67 IN | BODY MASS INDEX: 25.58 KG/M2 | WEIGHT: 163 LBS

## 2022-05-16 DIAGNOSIS — M79.661 PAIN IN RIGHT SHIN: ICD-10-CM

## 2022-05-16 DIAGNOSIS — S39.92XA LOWER BACK INJURY, INITIAL ENCOUNTER: ICD-10-CM

## 2022-05-16 DIAGNOSIS — Z98.1 HISTORY OF LUMBAR FUSION: Primary | ICD-10-CM

## 2022-05-16 DIAGNOSIS — K43.2 INCISIONAL HERNIA, WITHOUT OBSTRUCTION OR GANGRENE: Primary | ICD-10-CM

## 2022-05-16 DIAGNOSIS — M54.50 LOW BACK PAIN: Primary | ICD-10-CM

## 2022-05-16 DIAGNOSIS — Z98.890 POSTOPERATIVE EYE STATE: Primary | ICD-10-CM

## 2022-05-16 DIAGNOSIS — S80.11XA CONTUSION OF RIGHT LOWER LEG, INITIAL ENCOUNTER: ICD-10-CM

## 2022-05-16 PROCEDURE — 99214 OFFICE O/P EST MOD 30 MIN: CPT | Mod: 24 | Performed by: SURGERY

## 2022-05-16 PROCEDURE — 99213 OFFICE O/P EST LOW 20 MIN: CPT | Performed by: FAMILY MEDICINE

## 2022-05-16 PROCEDURE — 99024 POSTOP FOLLOW-UP VISIT: CPT | Mod: GC | Performed by: OPHTHALMOLOGY

## 2022-05-16 ASSESSMENT — PAIN SCALES - GENERAL: PAINLEVEL: MILD PAIN (2)

## 2022-05-16 ASSESSMENT — VISUAL ACUITY
OS_SC+: +
METHOD: SNELLEN - LINEAR
OD_SC: 20/30
OS_PH_SC: 20/40
OS_SC: 20/50
CORRECTION_TYPE: GLASSES

## 2022-05-16 ASSESSMENT — TONOMETRY
IOP_METHOD: TONOPEN
OD_IOP_MMHG: 17
OS_IOP_MMHG: 12

## 2022-05-16 ASSESSMENT — EXTERNAL EXAM - RIGHT EYE: OD_EXAM: NORMAL

## 2022-05-16 ASSESSMENT — CONF VISUAL FIELD
METHOD: COUNTING FINGERS
OS_NORMAL: 1
OD_NORMAL: 1

## 2022-05-16 ASSESSMENT — EXTERNAL EXAM - LEFT EYE: OS_EXAM: NORMAL

## 2022-05-16 NOTE — PROGRESS NOTES
"Sports Medicine Clinic Visit    PCP: Matilde Quiñonez Chris is a 71 year old female who is seen  as self referral presenting with LBP after a fall.  Acute left sided low back pain s/p fall 5/4/22, twelve days ago.  She fell while at [employer].  She indicates the pain is at a level of 8 out of 10 and is localized directly over the left side of the lumbar spine.  She points to the low lumbar spine when she describes it.  It is without radicular discomfort in either lower extremity.  No peripheral numbness or tingling.  No loss of bowel or bladder control.  She indicates that she has been taking Vicodin for the back discomfort.    Patient is a patient of Dr. Ramirez in orthopedic spine surgery. H/o L5-S1 interbody fusion with decompression on 11/18/2021 complicated by a fall a few weeks after that surgery, where she was found to have osteoporotic fractures around the L5 pedicle screws as well as sacral insufficiency fractures.    She additionally mentions right-sided anterior shin discomfort.  Since her fall she has noted discomfort in the right mid tibia and is concerned that she may have a fracture there that was not seen on her previous ankle x-rays.  She has extensive bruising over the anterior tibia.    She did not tolerate the cam walker boot for her right foot fractures.  She presents today in a tennis shoe.  The cam walker pushed against her shin was poorly tolerated.      Injury: Fall on 5/4/22     Location of Pain: left lower back  Duration of Pain: 12 day(s)  Rating of Pain: 8/10  Pain is better with: vicodin  Pain is worse with: walking  Additional Features: NA  Treatment so far consists of: Vicodin    Ht 1.702 m (5' 7\")   Wt 73.9 kg (163 lb)   BMI 25.53 kg/m          5/4/22 dx with Right thumb tuft fracture, nondisplaced.  Right ankle distal fibular fracture nondisplaced, right foot distal fifth and fourth metatarsal fractures nondisplaced.  Soft tissue injury right anterior knee.  Soft " tissue injury left fifth finger  She avoids nonsteroidal anti-inflammatories.  She usesTylenol for pain.  She has a walker to assist with walking.  She was given a cam walker boot for weightbearing as tolerated with the help of the walker.  She was given a Stax splint to protect the thumb distal digit.    Previous right wrist fusion, sees Dr. Conley in Hand Surgery.  Just saw him 5/3/22 for her bilateral wrists, in [place] and had bilateral wrist cortisone injections.    DEXA scan 11/2021 low bone density.  Osteoporosis per fracture history.  Last Zometa in November 2021.  Fosamax in the past.  Patient is on vitamin D supplement.  Sees Dr. Clark for bone health.      Sees Dr. Clau Antoine in neurology at the [facility] for balance issues, last visit 7/2020.  Pt has dx history of breast cancer status post chemotherapy, peripheral neuropathy, and a lower medullary brain lesion per 7/2020 Neurology note, reviewed by me.        Retired , [place], medical ICU nurse.            2 views lumbar spine radiographs 5/3/2022 12:36 PM     History: Lumbar radiculopathy      Comparison: 2/1/2022     Findings:     Standing  AP and lateral  views of the lumbar spine were obtained.     5  lumbar type vertebral bodies are assumed for the purpose of this  dictation.     There is no acute osseous abnormality.       Moderate convex right curvature of the thoracolumbar/lumbar spine  which compromises assessment of disc spaces and vertebral body  heights.     L5-S1 fusion instrumentation appears stable. Unchanged 8 mm of  anterolisthesis of L5 on S1.     There is multilevel degenerative changes of the lumbar spine, most  pronounced at L3-L4 with severe disc space loss and vacuum phenomenon.  There is also lower lumbar predominant facet arthropathy. Spinous  process hypertrophy as can be seen in Baastrup's disease. Mild  retrolisthesis of L1 on L2.     The visualized bowel gas pattern is non-obstructive.     Vascular  calcifications.                                                                       Impression:  1.  No acute osseous abnormality. Stable L5-S1 fusion instrumentation.  2.  Multilevel degenerative changes most pronounced at L3-L4.     ENEIDA CHOW MD (Joe)       PMH:  Past Medical History:   Diagnosis Date     Arthritis      Bone disease      Breast cancer (H)      H/O kyphoplasty      Hearing problem      History of kidney stones      History of radiation therapy      Hyperlipemia      Hypertension      Hypopotassemia      Kidney problem      Lymph edema      Medullary sponge kidney      Osteopenia      PONV (postoperative nausea and vomiting)      Reduced vision      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     Thyroid disease        Active problem list:  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     Corneal opacity     Hypothyroidism     Osteopenia, unspecified location     Aromatase inhibitor use     Chronic pain of right knee     DDD (degenerative disc disease), lumbar     Degenerative scoliosis in adult patient     Peripheral neuropathy     Spinal stenosis of lumbar region with neurogenic claudication     Spondylolisthesis of lumbar region     Brain lesion     Dermatochalasis of both upper eyelids     S/P lumbar fusion     Chronic bilateral low back pain     PVD (posterior vitreous detachment), left eye     Vitreous opacities of left eye       FH:  Family History   Problem Relation Age of  Onset     Neurologic Disorder Mother         Anuerysm of Cerebral Artery, Dementia     Diabetes Mother      Thyroid Disease Mother         ,     Cerebrovascular Disease Mother      Dementia Mother      Osteoporosis Mother      Heart Disease Father         AAA     Hypertension Father      Circulatory Brother         Perihperal Neurophathy     Diabetes Maternal Grandmother      Asthma Maternal Grandmother      Chronic Obstructive Pulmonary Disease Maternal Grandfather         father     Asthma Maternal Grandfather      Diabetes Maternal Aunt         x2     Melanoma Maternal Aunt      Glaucoma Maternal Aunt      Breast Cancer Cousin      Dementia Other      Cancer Other         malignant melanoma     Hypertension Other      Hypertension Other      Cerebrovascular Disease Other      Cerebrovascular Disease Other      Obesity Other      Respiratory Other      Cancer Other      Diabetes Other      Asthma Other      Macular Degeneration No family hx of      Coronary Artery Disease No family hx of      Hyperlipidemia No family hx of      Kidney Disease No family hx of      Thrombosis No family hx of      Arthritis No family hx of      Depression No family hx of      Mental Illness No family hx of      Substance Abuse No family hx of      Cystic Fibrosis No family hx of      Early Death No family hx of      Coronary Artery Disease Early Onset No family hx of      Heart Failure No family hx of      Bleeding Diathesis No family hx of      Ovarian Cancer No family hx of      Uterine Cancer No family hx of      Prostate Cancer No family hx of      Colorectal Cancer No family hx of      Pancreatic Cancer No family hx of      Lung Cancer No family hx of      Other Cancer No family hx of      Autoimmune Disease No family hx of      Unknown/Adopted No family hx of      Genetic Disorder No family hx of      Bleeding Disorder No family hx of      Clotting Disorder No family hx of      Anesthesia Reaction No family hx of         SH:  Social History     Socioeconomic History     Marital status:      Spouse name: Not on file     Number of children: Not on file     Years of education: Not on file     Highest education level: Not on file   Occupational History     Not on file   Tobacco Use     Smoking status: Never Smoker     Smokeless tobacco: Never Used   Substance and Sexual Activity     Alcohol use: Not Currently     Alcohol/week: 7.0 standard drinks     Types: 7 Glasses of wine per week     Comment: Rare to occasional     Drug use: Yes     Types: Marijuana     Sexual activity: Not Currently     Partners: Male     Birth control/protection: Abstinence   Other Topics Concern      Service No     Blood Transfusions Not Asked     Caffeine Concern No     Occupational Exposure No     Hobby Hazards No     Sleep Concern No     Stress Concern No     Weight Concern No     Special Diet No     Back Care No     Exercise Yes     Comment: walks 4-6x  week for 20-30 min. each     Bike Helmet Not Asked     Seat Belt Yes     Self-Exams Yes     Parent/sibling w/ CABG, MI or angioplasty before 65F 55M? No   Social History Narrative    .  Recently retired. She has retired from [employer] 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        She lives with a renter.         She exercises 50 minutes three times a week.     Social Determinants of Health     Financial Resource Strain: Not on file   Food Insecurity: Not on file   Transportation Needs: Not on file   Physical Activity: Not on file   Stress: Not on file   Social Connections: Not on file   Intimate Partner Violence: Not on file   Housing Stability: Not on file       MEDS:  See EMR, reviewed  ALL:  See EMR, reviewed    REVIEW OF SYSTEMS:  CONSTITUTIONAL:NEGATIVE for fever, chills, change in weight  INTEGUMENTARY/SKIN: NEGATIVE for worrisome rashes, moles or lesions  EYES: NEGATIVE for vision changes or irritation  ENT/MOUTH: NEGATIVE for  ear, mouth and throat problems  RESP:NEGATIVE for significant cough or SOB  BREAST: NEGATIVE for masses, tenderness or discharge  CV: NEGATIVE for chest pain, palpitations or peripheral edema  GI: NEGATIVE for nausea, abdominal pain, heartburn, or change in bowel habits  :NEGATIVE for frequency, dysuria, or hematuria  :NEGATIVE for frequency, dysuria, or hematuria  NEURO: NEGATIVE for weakness, dizziness or paresthesias  ENDOCRINE: NEGATIVE for temperature intolerance, skin/hair changes  HEME/ALLERGY/IMMUNE: NEGATIVE for bleeding problems  PSYCHIATRIC: NEGATIVE for changes in mood or affect      Objective: She has extensive bruising over the right anterior shin in various stages of resolution.  The skin is intact without sign of an open wound.  Nontender in the calf.  She will dorsiflex and rose mary the  ankle against resistance.  Straight leg raise is negative.  She has 5 out of 5 strength at the hip, knee to flexion and extension.  She has tenderness at the lateral ankle and foot in the site of her previous fractures.  Sensation is intact distally.  The skin overlying the low back is normal.  She points to the left side of the lower back as her area of discomfort.  She seems nontender directly over the spinous processes.      We reviewed her updated x-rays of the low back that seem unchanged from an x-ray taken 5/3/202 with no obvious acute fracture seen and intact fusion hardware.    We reviewed an x-ray of her right tibia and fibula shows no acute fracture.  We reviewed the previous subtle fracture of the tip of her right fibula that was seen on her previous ankle x-ray from 5/4.      Assessment acute low back discomfort with history of lumbar spine fusion, rule out fracture.  Right-sided shin discomfort with bruising status post fall.  Acute right-sided distal fibular fracture and acute right-sided foot fracture.    Plan: She was given a postop shoe to assist with ambulation in the setting of her foot  fracture.  She will follow-up regarding the foot fracture later this week.  CT scan of the lumbar spine is pending and she will have a follow-up visit with Dr. Ramirez to go over those results.

## 2022-05-16 NOTE — LETTER
5/16/2022       RE: Nadira Perez  [address]  [facility] [address]     Dear Colleague,    Thank you for referring your patient, Nadira Perez, to the [facility] [place] at [facility]. Please see a copy of my visit note below.    Ismael Ashton returns to clinic to follow-up from her ventral hernia.  She had a CT scan done earlier this year for suspicion of diverticulitis.  We use this to for roadmap.  It shows a 2 x 1.5 centimeter abdominal wall defect  medial to the right rectus abdominis muscle inferior to the umbilicus.  The contents are fat.  She has a wide rectus diastases and a prominent rectus abdominis muscle.    Assessment and plan.  We plan on a ventral hernia repair.  I will present her at hernia conference to decide on the best approach.  I am thinking of a laparoscopic approach with an attempt to create a preperitoneal plane.  My ems I am skeptical that this will be possible based on the thin nature of her abdominal wall.  And I think we will end up putting in an intra-abdominal piece of mesh as an IPOM.  An alternative would be a primary repair done open.  Even doing it open I do not think we will be able to create a preperitoneal space.  These of the questions I will be raising at the hernia conference.    Past medical history has changed and that she had a recent fall with multiple fractures.  She also had bilateral blepharoplasty.  Past Medical History:   Diagnosis Date     Arthritis      Bone disease      Breast cancer (H)      H/O kyphoplasty      Hearing problem      History of kidney stones      History of radiation therapy      Hyperlipemia      Hypertension      Hypopotassemia      Kidney problem      Lymph edema      Medullary sponge kidney      Osteopenia      PONV (postoperative nausea and vomiting)      Reduced vision      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     Thyroid  disease      Past Surgical History:   Procedure Laterality Date     ABDOMEN SURGERY      ovarian cyst, mesh     ARTHRODESIS WRIST Right      ARTHRODESIS WRIST  02/14/2013    Procedure: ARTHRODESIS WRIST;  left wrist scaphoid excision, four bone fusion, iliac crest bone graft  ( Mac with block);  Surgeon: Av Mendez MD;  [facility]     BIOPSY      skin, vaginal     BLEPHAROPLASTY BILATERAL Bilateral 5/6/2022    Procedure: UPPER BLEPHAROPLASTY, BILATERAL;  Surgeon: Jemal Sanchez MD;  Location: [facility] OR     CATARACT IOL, RT/LT Right 03/13/2018     CATARACT IOL, RT/LT Left 02/20/2018     COLONOSCOPY  12/24/2013    Procedure: COMBINED COLONOSCOPY, SINGLE BIOPSY/POLYPECTOMY BY BIOPSY;  COLONOSCOPY;  Surgeon: Dom Alvarez MD;  Location:  GI     COSMETIC BLEPHAROPLASTY LOWER LIDS BILATERAL Bilateral 5/6/2022    Procedure: BLEPHAROPLASTY, LOWER EYELID, BILATERAL, COSMETIC;  Surgeon: Jemal Sanchez MD;  Location: [facility]     ESOPHAGOSCOPY, GASTROSCOPY, DUODENOSCOPY (EGD), COMBINED N/A 11/23/2016    Procedure: COMBINED ESOPHAGOSCOPY, GASTROSCOPY, DUODENOSCOPY (EGD);  Surgeon: Quinten Feliciano MD;  Location:  GI     EXTERNAL EAR SURGERY      right     EYE SURGERY      radial keratomy     FUSION, SPINE, INTERBODY, OBLIQUE ANTERIOR AND LUMBAR, 2 LEVELS, POST APPROACH, USING OTS N/A 11/18/2021    Procedure: Part 1: Oblique Anterior Interbody Fusion at Lumbar 5 to sacral 1 with use of Bone Morphogenic Protein,;  Surgeon: Serge Ramirez MD;  Location: [facility]     GRAFT BONE FROM ILIAC CREST  02/14/2013    Procedure: GRAFT BONE FROM ILIAC CREST;  mac with block and local infilitration;  Surgeon: Av Mendez MD;  [facility]: [facility]     HC BREATH HYDROGEN TEST N/A 10/14/2016    Procedure: HYDROGEN BREATH TEST;  Surgeon: Cheri Barron MD;  Location:  GI     HERNIA REPAIR      umbilical age 18 mos.     HYSTERECTOMY TOTAL ABDOMINAL  05/03/2000     MASTECTOMY MODIFIED  RADICAL Bilateral     bilateral; right breast prophylactic     OPTICAL TRACKING SYSTEM FUSION POSTERIOR SPINE LUMBAR N/A 11/18/2021    Procedure: Open Posterior Instrumented Spinal Fusion at Lumbar 5 to Sacral 1, with grimm aguayo osteotomy, use of O-Arm/Stealth;  Surgeon: Serge Ramirez MD;  Location: [facility] OR     PARATHYROIDECTOMY  09/23/2004    R SUPERIOR     PARATHYROIDECTOMY  09/23/2004    parathyroid resection, subtotal     RELEASE CARPAL TUNNEL Right 12/2/2021    Procedure: RIGHT CARPAL TUNNEL RELEASE, RIGHT WRIST HARDWARE REMOVAL, RIGHT CARPAL BOSS EXCISION;  Surgeon: Rolan Conley MD;  Location: [facility] OR     REMOVE HARDWARE HAND  09/24/2013    Procedure: REMOVE HARDWARE HAND;  Left Hand Screw Removal        RHINOPLASTY  1968     thyr proc skin closed cosmetic manner by subcuticular stitch  01/23/2009     THYROPLASTY  10/09/2009     TONSILLECTOMY  1977     WRIST SURGERY      wrist arthrodesis        Allergies   Allergen Reactions     Erythromycin Nausea     Penicillins Hives     Around age 6 - doesn't recall full reaction, mother told her it was hives.     Has tolerated cephalsporins.      Current Outpatient Medications   Medication Instructions     acetaminophen (TYLENOL) 500-1,000 mg, Oral, EVERY 6 HOURS PRN     amLODIPine (NORVASC) 10 mg, Oral, DAILY     Ascorbic Acid (VITAMIN C) 500 MG CHEW 1 tablet, Oral, DAILY     atorvastatin (LIPITOR) 80 mg, Oral, DAILY     bacitracin 500 UNIT/GM ophthalmic ointment Apply small amount to incision sites three times daily and apply to inner lower lid of operative eye(s) at bedtime, as directed per physician instructions.     CRANBERRY EXTRACT  mg, Oral, DAILY, ~10 am  Takes 1     diclofenac (VOLTAREN) 1 % topical gel APPLY 4 GRAMS TO KNEES OR 2 GRAMS TO HANDS FOUR TIMES DAILY USING ENCLOSED DOSING CARD.     erythromycin (ROMYCIN) 5 MG/GM ophthalmic ointment Apply small amount to incision sites three times daily, then apply to inner lower lid of operative  eye(s) at bedtime, as directed.     gabapentin (NEURONTIN) 900 mg, Oral, 3 TIMES DAILY     HYDROcodone-acetaminophen (NORCO) 5-325 MG tablet 1 tablet, Oral, EVERY 6 HOURS PRN, Uses about 10 tablets per month.      hydrOXYzine (ATARAX) 10 mg, Oral, 2 TIMES DAILY PRN     hydrOXYzine (VISTARIL) 25 mg, Oral, AT BEDTIME     ibuprofen (ADVIL/MOTRIN) 600 mg, Oral, EVERY 6 HOURS PRN     levothyroxine (SYNTHROID/LEVOTHROID) 112 MCG tablet TAKE 1/2 TABLET BY MOUTH DAILY     lisinopril (ZESTRIL) 40 mg, Oral, DAILY     melatonin 10 mg, Oral, AT BEDTIME     methocarbamol (ROBAXIN) 500 mg, Oral, 3 TIMES DAILY     metoprolol succinate ER (TOPROL XL) 50 mg, Oral, DAILY     Multiple Vitamin (MULTI-VITAMIN) per tablet 1 tablet, Oral, DAILY     naloxone (NARCAN) 4 mg, Alternating Nostrils, ONCE PRN, every 2-3 minutes until assistance arrives     neomycin-polymixin-dexamethasone (MAXITROL) 0.1 % ophthalmic suspension 1 drop, Both Eyes, 4 TIMES DAILY     neomycin-polymyxin-dexamethasone (MAXITROL) 3.5-50713-0.1 SUSP ophthalmic susp 1-2 drops, Both Eyes, 4 TIMES DAILY     ondansetron (ZOFRAN ODT) 4 mg, Oral, EVERY 12 HOURS PRN     senna-docusate (SENOKOT-S/PERICOLACE) 8.6-50 MG tablet 2 tablets, Oral, 2 TIMES DAILY     spironolactone (ALDACTONE) 50 mg, Oral, DAILY     triamcinolone (KENALOG) 0.1 % external ointment Topical, 2 TIMES DAILY     Vaginal Lubricant (REPHRESH) GEL 2 g, Vaginal, EVERY 3 DAYS     vitamin D3 6,000 Units, Oral, DAILY       Exam  /80   Pulse 89   SpO2 98%   The abdomen soft nontender.  There is a palpable hernia defect that protrudes to the right that is easily reducible.  She has a former transverse scar that cephalad to where the hernia is.      I spent 30 minutes with the patient reviewing charts reviewing CT scans and doing a physical exam.        Sincerely,    Dhiraj Encarnacion MD

## 2022-05-16 NOTE — LETTER
"  5/16/2022      RE: Nadira Perez  [id number]  [facility] [address]     Dear Colleague,    Thank you for referring your patient, Nadira Perez, to the [facility] [place]. Please see a copy of my visit note below.    Sports Medicine Clinic Visit    PCP: Matilde Quiñonez    Nadira Perez is a 71 year old female who is seen  as self referral presenting with LBP after a fall.  Acute left sided low back pain s/p fall 5/4/22, twelve days ago.  She fell while at [employer].  She indicates the pain is at a level of 8 out of 10 and is localized directly over the left side of the lumbar spine.  She points to the low lumbar spine when she describes it.  It is without radicular discomfort in either lower extremity.  No peripheral numbness or tingling.  No loss of bowel or bladder control.  She indicates that she has been taking Vicodin for the back discomfort.    Patient is a patient of Dr. Ramirez in orthopedic spine surgery. H/o L5-S1 interbody fusion with decompression on 11/18/2021 complicated by a fall a few weeks after that surgery, where she was found to have osteoporotic fractures around the L5 pedicle screws as well as sacral insufficiency fractures.    She additionally mentions right-sided anterior shin discomfort.  Since her fall she has noted discomfort in the right mid tibia and is concerned that she may have a fracture there that was not seen on her previous ankle x-rays.  She has extensive bruising over the anterior tibia.    She did not tolerate the cam walker boot for her right foot fractures.  She presents today in a tennis shoe.  The cam walker pushed against her shin was poorly tolerated.      Injury: Fall on 5/4/22     Location of Pain: left lower back  Duration of Pain: 12 day(s)  Rating of Pain: 8/10  Pain is better with: vicodin  Pain is worse with: walking  Additional Features: NA  Treatment so far consists of: Vicodin    Ht 1.702 m (5' 7\")   Wt " 73.9 kg (163 lb)   BMI 25.53 kg/m          5/4/22 dx with Right thumb tuft fracture, nondisplaced.  Right ankle distal fibular fracture nondisplaced, right foot distal fifth and fourth metatarsal fractures nondisplaced.  Soft tissue injury right anterior knee.  Soft tissue injury left fifth finger  She avoids nonsteroidal anti-inflammatories.  She usesTylenol for pain.  She has a walker to assist with walking.  She was given a cam walker boot for weightbearing as tolerated with the help of the walker.  She was given a Stax splint to protect the thumb distal digit.    Previous right wrist fusion, sees Dr. Conley in Hand Surgery.  Just saw him 5/3/22 for her bilateral wrists, in [place] and had bilateral wrist cortisone injections.    DEXA scan 11/2021 low bone density.  Osteoporosis per fracture history.  Last Zometa in November 2021.  Fosamax in the past.  Patient is on vitamin D supplement.  Sees Dr. Clark for bone health.      Sees Dr. Clau Antoine in neurology at the [facility] for balance issues, last visit 7/2020.  Pt has dx history of breast cancer status post chemotherapy, peripheral neuropathy, and a lower medullary brain lesion per 7/2020 Neurology note, reviewed by me.        Retired , [place], medical ICU nurse.            2 views lumbar spine radiographs 5/3/2022 12:36 PM     History: Lumbar radiculopathy      Comparison: 2/1/2022     Findings:     Standing  AP and lateral  views of the lumbar spine were obtained.     5  lumbar type vertebral bodies are assumed for the purpose of this  dictation.     There is no acute osseous abnormality.       Moderate convex right curvature of the thoracolumbar/lumbar spine  which compromises assessment of disc spaces and vertebral body  heights.     L5-S1 fusion instrumentation appears stable. Unchanged 8 mm of  anterolisthesis of L5 on S1.     There is multilevel degenerative changes of the lumbar spine, most  pronounced at L3-L4 with severe  disc space loss and vacuum phenomenon.  There is also lower lumbar predominant facet arthropathy. Spinous  process hypertrophy as can be seen in Baastrup's disease. Mild  retrolisthesis of L1 on L2.     The visualized bowel gas pattern is non-obstructive.     Vascular calcifications.                                                                       Impression:  1.  No acute osseous abnormality. Stable L5-S1 fusion instrumentation.  2.  Multilevel degenerative changes most pronounced at L3-L4.     ENEIDA CHOW MD (Joe)       PMH:  Past Medical History:   Diagnosis Date     Arthritis      Bone disease      Breast cancer (H)      H/O kyphoplasty      Hearing problem      History of kidney stones      History of radiation therapy      Hyperlipemia      Hypertension      Hypopotassemia      Kidney problem      Lymph edema      Medullary sponge kidney      Osteopenia      PONV (postoperative nausea and vomiting)      Reduced vision      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     Thyroid disease        Active problem list:  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     Corneal opacity     Hypothyroidism     Osteopenia, unspecified location     Aromatase inhibitor use     Chronic pain of right knee     DDD (degenerative disc disease), lumbar     Degenerative scoliosis in adult patient     Peripheral neuropathy     Spinal stenosis of lumbar region with neurogenic claudication     Spondylolisthesis of  lumbar region     Brain lesion     Dermatochalasis of both upper eyelids     S/P lumbar fusion     Chronic bilateral low back pain     PVD (posterior vitreous detachment), left eye     Vitreous opacities of left eye       FH:  Family History   Problem Relation Age of Onset     Neurologic Disorder Mother         Anuerysm of Cerebral Artery, Dementia     Diabetes Mother      Thyroid Disease Mother         ,     Cerebrovascular Disease Mother      Dementia Mother      Osteoporosis Mother      Heart Disease Father         AAA     Hypertension Father      Circulatory Brother         Perihperal Neurophathy     Diabetes Maternal Grandmother      Asthma Maternal Grandmother      Chronic Obstructive Pulmonary Disease Maternal Grandfather         father     Asthma Maternal Grandfather      Diabetes Maternal Aunt         x2     Melanoma Maternal Aunt      Glaucoma Maternal Aunt      Breast Cancer Cousin      Dementia Other      Cancer Other         malignant melanoma     Hypertension Other      Hypertension Other      Cerebrovascular Disease Other      Cerebrovascular Disease Other      Obesity Other      Respiratory Other      Cancer Other      Diabetes Other      Asthma Other      Macular Degeneration No family hx of      Coronary Artery Disease No family hx of      Hyperlipidemia No family hx of      Kidney Disease No family hx of      Thrombosis No family hx of      Arthritis No family hx of      Depression No family hx of      Mental Illness No family hx of      Substance Abuse No family hx of      Cystic Fibrosis No family hx of      Early Death No family hx of      Coronary Artery Disease Early Onset No family hx of      Heart Failure No family hx of      Bleeding Diathesis No family hx of      Ovarian Cancer No family hx of      Uterine Cancer No family hx of      Prostate Cancer No family hx of      Colorectal Cancer No family hx of      Pancreatic Cancer No family hx of      Lung Cancer No family hx of      Other  Cancer No family hx of      Autoimmune Disease No family hx of      Unknown/Adopted No family hx of      Genetic Disorder No family hx of      Bleeding Disorder No family hx of      Clotting Disorder No family hx of      Anesthesia Reaction No family hx of        SH:  Social History     Socioeconomic History     Marital status:      Spouse name: Not on file     Number of children: Not on file     Years of education: Not on file     Highest education level: Not on file   Occupational History     Not on file   Tobacco Use     Smoking status: Never Smoker     Smokeless tobacco: Never Used   Substance and Sexual Activity     Alcohol use: Not Currently     Alcohol/week: 7.0 standard drinks     Types: 7 Glasses of wine per week     Comment: Rare to occasional     Drug use: Yes     Types: Marijuana     Sexual activity: Not Currently     Partners: Male     Birth control/protection: Abstinence   Other Topics Concern      Service No     Blood Transfusions Not Asked     Caffeine Concern No     Occupational Exposure No     Hobby Hazards No     Sleep Concern No     Stress Concern No     Weight Concern No     Special Diet No     Back Care No     Exercise Yes     Comment: walks 4-6x  week for 20-30 min. each     Bike Helmet Not Asked     Seat Belt Yes     Self-Exams Yes     Parent/sibling w/ CABG, MI or angioplasty before 65F 55M? No   Social History Narrative    .  Recently retired. 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        She lives with a renter.         She exercises 50 minutes three times a week.     Social Determinants of Health     Financial Resource Strain: Not on file   Food Insecurity: Not on file   Transportation Needs: Not on file   Physical Activity: Not on file   Stress: Not on file   Social Connections: Not on file   Intimate Partner Violence: Not on file   Housing Stability: Not on file       MEDS:  See EMR,  reviewed  ALL:  See EMR, reviewed    REVIEW OF SYSTEMS:  CONSTITUTIONAL:NEGATIVE for fever, chills, change in weight  INTEGUMENTARY/SKIN: NEGATIVE for worrisome rashes, moles or lesions  EYES: NEGATIVE for vision changes or irritation  ENT/MOUTH: NEGATIVE for ear, mouth and throat problems  RESP:NEGATIVE for significant cough or SOB  BREAST: NEGATIVE for masses, tenderness or discharge  CV: NEGATIVE for chest pain, palpitations or peripheral edema  GI: NEGATIVE for nausea, abdominal pain, heartburn, or change in bowel habits  :NEGATIVE for frequency, dysuria, or hematuria  :NEGATIVE for frequency, dysuria, or hematuria  NEURO: NEGATIVE for weakness, dizziness or paresthesias  ENDOCRINE: NEGATIVE for temperature intolerance, skin/hair changes  HEME/ALLERGY/IMMUNE: NEGATIVE for bleeding problems  PSYCHIATRIC: NEGATIVE for changes in mood or affect      Objective: She has extensive bruising over the right anterior shin in various stages of resolution.  The skin is intact without sign of an open wound.  Nontender in the calf.  She will dorsiflex and rose mary the  ankle against resistance.  Straight leg raise is negative.  She has 5 out of 5 strength at the hip, knee to flexion and extension.  She has tenderness at the lateral ankle and foot in the site of her previous fractures.  Sensation is intact distally.  The skin overlying the low back is normal.  She points to the left side of the lower back as her area of discomfort.  She seems nontender directly over the spinous processes.      We reviewed her updated x-rays of the low back that seem unchanged from an x-ray taken 5/3/202 with no obvious acute fracture seen and intact fusion hardware.    We reviewed an x-ray of her right tibia and fibula shows no acute fracture.  We reviewed the previous subtle fracture of the tip of her right fibula that was seen on her previous ankle x-ray from 5/4.      Assessment acute low back discomfort with history of lumbar spine  fusion, rule out fracture.  Right-sided shin discomfort with bruising status post fall.  Acute right-sided distal fibular fracture and acute right-sided foot fracture.    Plan: She was given a postop shoe to assist with ambulation in the setting of her foot fracture.  She will follow-up regarding the foot fracture later this week.  CT scan of the lumbar spine is pending and she will have a follow-up visit with Dr. Ramirez to go over those results.      Again, thank you for allowing me to participate in the care of your patient.      Sincerely,    Chaitanya Miguel MD

## 2022-05-16 NOTE — PROGRESS NOTES
Chief Complaints and History of Present Illnesses   Patient presents with     Follow Up     Chief Complaint(s) and History of Present Illness(es)     Follow Up     In both eyes.              Comments     Patient following up from BULB and lower lid cosmetic bleph. Patient   states LLL suture bothering her a bit. Patient wondering if lashes were   cut because lower lashes seem sparse. Patients compliant with ointment   each eye. Patient maybe needs more erythromycin. Patient denies pain and   discomfort each eye.     TYLER Rojas May 16, 2022 9:59 AM                    Assessment & Plan     Nadira Perez is a 71 year old female with the following diagnoses:   1. Postoperative eye state         S/p BULB + BLLB 5/6/22  - doing well, lids in good position. Left upper lid has mild wound dehiscence laterally  - pt happy with outcomes  Plan:  * completed maxitrol  * Continue antibiotic ointment or bland lubricating ointment (eg vaseline or aquaphor) to the incision site twice a day   * Massage along the incision BID  * Warm soaks QID until all edema and ecchymoses resolve  * Return to clinic in 6-8 weeks             Leona Campbell MD  Oculoplastics Fellow      Attending Physician Attestation:  I have seen and examined this patient with the fellow .  I have confirmed and edited as necessary the chief complaint(s), history of present illness, review of systems, relevant history, and examination findings as documented by others.  I have personally reviewed the relevant tests, images, and reports as documented above.  I have confirmed and edited as necessary the assessment and plan and agree with this note.    - Jemal Sanchez MD 10:24 AM 5/16/2022

## 2022-05-16 NOTE — PATIENT INSTRUCTIONS
Continue antibiotic ointment or bland lubricating ointment (eg vaseline or aquaphor) to the incision(s) three times a day.  Gently massage along the incision(s) two times a day.  Use warm soaks over the incision(s) four times a day until swelling and bruises resolve.

## 2022-05-16 NOTE — NURSING NOTE
Chief Complaints and History of Present Illnesses   Patient presents with     Follow Up     Chief Complaint(s) and History of Present Illness(es)     Follow Up     Laterality: both eyes              Comments     Patient following up from BULB and lower lid cosmetic bleph. Patient states LLL suture bothering her a bit. Patient wondering if lashes were cut because lower lashes seem sparse. Patients compliant with ointment each eye. Patient maybe needs more erythromycin. Patient denies pain and discomfort each eye.     Linda Obregon, TYLER May 16, 2022 9:59 AM

## 2022-05-16 NOTE — NURSING NOTE
Chief Complaint   Patient presents with     RECHECK     F/up ventral hernia prior to scheduling surgery       Vitals:    05/16/22 0829   BP: 131/80   Pulse: 89   SpO2: 98%       There is no height or weight on file to calculate BMI.          YSABEL JOHNSON EMT

## 2022-05-16 NOTE — PROGRESS NOTES
Ismael Ashton returns to clinic to follow-up from her ventral hernia.  She had a CT scan done earlier this year for suspicion of diverticulitis.  We use this to for roadmap.  It shows a 2 x 1.5 centimeter abdominal wall defect  medial to the right rectus abdominis muscle inferior to the umbilicus.  The contents are fat.  She has a wide rectus diastases and a prominent rectus abdominis muscle.    Assessment and plan.  We plan on a ventral hernia repair.  I will present her at hernia conference to decide on the best approach.  I am thinking of a laparoscopic approach with an attempt to create a preperitoneal plane.  My ems I am skeptical that this will be possible based on the thin nature of her abdominal wall.  And I think we will end up putting in an intra-abdominal piece of mesh as an IPOM.  An alternative would be a primary repair done open.  Even doing it open I do not think we will be able to create a preperitoneal space.  These of the questions I will be raising at the hernia conference.    Past medical history has changed and that she had a recent fall with multiple fractures.  She also had bilateral blepharoplasty.  Past Medical History:   Diagnosis Date     Arthritis      Bone disease      Breast cancer (H)      H/O kyphoplasty      Hearing problem      History of kidney stones      History of radiation therapy      Hyperlipemia      Hypertension      Hypopotassemia      Kidney problem      Lymph edema      Medullary sponge kidney      Osteopenia      PONV (postoperative nausea and vomiting)      Reduced vision      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     Thyroid disease      Past Surgical History:   Procedure Laterality Date     ABDOMEN SURGERY      ovarian cyst, mesh     ARTHRODESIS WRIST Right      ARTHRODESIS WRIST  02/14/2013    Procedure: ARTHRODESIS WRIST;  left wrist scaphoid excision, four bone fusion, iliac crest bone graft  ( Mac with block);  Surgeon: Av Mendez MD;   [facility]     BIOPSY      skin, vaginal     BLEPHAROPLASTY BILATERAL Bilateral 5/6/2022    Procedure: UPPER BLEPHAROPLASTY, BILATERAL;  Surgeon: Jemal Sanchez MD;  Location: [facility] OR     CATARACT IOL, RT/LT Right 03/13/2018     CATARACT IOL, RT/LT Left 02/20/2018     COLONOSCOPY  12/24/2013    Procedure: COMBINED COLONOSCOPY, SINGLE BIOPSY/POLYPECTOMY BY BIOPSY;  COLONOSCOPY;  Surgeon: Dom Alvarez MD;  Location:  GI     COSMETIC BLEPHAROPLASTY LOWER LIDS BILATERAL Bilateral 5/6/2022    Procedure: BLEPHAROPLASTY, LOWER EYELID, BILATERAL, COSMETIC;  Surgeon: Jemal Sanchez MD;  [place] [facility] [place]     ESOPHAGOSCOPY, GASTROSCOPY, DUODENOSCOPY (EGD), COMBINED N/A 11/23/2016    Procedure: COMBINED ESOPHAGOSCOPY, GASTROSCOPY, DUODENOSCOPY (EGD);  Surgeon: Quinten Feliciano MD;  Location:  GI     EXTERNAL EAR SURGERY      right     EYE SURGERY      radial keratomy     FUSION, SPINE, INTERBODY, OBLIQUE ANTERIOR AND LUMBAR, 2 LEVELS, POST APPROACH, USING OTS N/A 11/18/2021    Procedure: Part 1: Oblique Anterior Interbody Fusion at Lumbar 5 to sacral 1 with use of Bone Morphogenic Protein,;  Surgeon: Serge Ramirez MD;  Location: [facility]     GRAFT BONE FROM ILIAC CREST  02/14/2013    Procedure: GRAFT BONE FROM ILIAC CREST;  mac with block and local infilitration;  Surgeon: Av Mendez MD;  [facility]     HC BREATH HYDROGEN TEST N/A 10/14/2016    Procedure: HYDROGEN BREATH TEST;  Surgeon: Cheri Barron MD;  Location:  GI     HERNIA REPAIR      umbilical age 18 mos.     HYSTERECTOMY TOTAL ABDOMINAL  05/03/2000     MASTECTOMY MODIFIED RADICAL Bilateral     bilateral; right breast prophylactic     OPTICAL TRACKING SYSTEM FUSION POSTERIOR SPINE LUMBAR N/A 11/18/2021    Procedure: Open Posterior Instrumented Spinal Fusion at Lumbar 5 to Sacral 1, with grimm aguayo osteotomy, use of O-Arm/Stealth;  Surgeon: Serge Ramirez MD;  Location: [facility] OR      PARATHYROIDECTOMY  09/23/2004    R SUPERIOR     PARATHYROIDECTOMY  09/23/2004    parathyroid resection, subtotal     RELEASE CARPAL TUNNEL Right 12/2/2021    Procedure: RIGHT CARPAL TUNNEL RELEASE, RIGHT WRIST HARDWARE REMOVAL, RIGHT CARPAL BOSS EXCISION;  Surgeon: Rolan Conley MD;  Location: [facility] OR     REMOVE HARDWARE HAND  09/24/2013    Procedure: REMOVE HARDWARE HAND;  Left Hand Screw Removal        RHINOPLASTY  1968     thyr proc skin closed cosmetic manner by subcuticular stitch  01/23/2009     THYROPLASTY  10/09/2009     TONSILLECTOMY  1977     WRIST SURGERY      wrist arthrodesis        Allergies   Allergen Reactions     Erythromycin Nausea     Penicillins Hives     Around age 6 - doesn't recall full reaction, mother told her it was hives.     Has tolerated cephalsporins.      Current Outpatient Medications   Medication Instructions     acetaminophen (TYLENOL) 500-1,000 mg, Oral, EVERY 6 HOURS PRN     amLODIPine (NORVASC) 10 mg, Oral, DAILY     Ascorbic Acid (VITAMIN C) 500 MG CHEW 1 tablet, Oral, DAILY     atorvastatin (LIPITOR) 80 mg, Oral, DAILY     bacitracin 500 UNIT/GM ophthalmic ointment Apply small amount to incision sites three times daily and apply to inner lower lid of operative eye(s) at bedtime, as directed per physician instructions.     CRANBERRY EXTRACT  mg, Oral, DAILY, ~10 am  Takes 1     diclofenac (VOLTAREN) 1 % topical gel APPLY 4 GRAMS TO KNEES OR 2 GRAMS TO HANDS FOUR TIMES DAILY USING ENCLOSED DOSING CARD.     erythromycin (ROMYCIN) 5 MG/GM ophthalmic ointment Apply small amount to incision sites three times daily, then apply to inner lower lid of operative eye(s) at bedtime, as directed.     gabapentin (NEURONTIN) 900 mg, Oral, 3 TIMES DAILY     HYDROcodone-acetaminophen (NORCO) 5-325 MG tablet 1 tablet, Oral, EVERY 6 HOURS PRN, Uses about 10 tablets per month.      hydrOXYzine (ATARAX) 10 mg, Oral, 2 TIMES DAILY PRN     hydrOXYzine (VISTARIL) 25 mg, Oral, AT BEDTIME      ibuprofen (ADVIL/MOTRIN) 600 mg, Oral, EVERY 6 HOURS PRN     levothyroxine (SYNTHROID/LEVOTHROID) 112 MCG tablet TAKE 1/2 TABLET BY MOUTH DAILY     lisinopril (ZESTRIL) 40 mg, Oral, DAILY     melatonin 10 mg, Oral, AT BEDTIME     methocarbamol (ROBAXIN) 500 mg, Oral, 3 TIMES DAILY     metoprolol succinate ER (TOPROL XL) 50 mg, Oral, DAILY     Multiple Vitamin (MULTI-VITAMIN) per tablet 1 tablet, Oral, DAILY     naloxone (NARCAN) 4 mg, Alternating Nostrils, ONCE PRN, every 2-3 minutes until assistance arrives     neomycin-polymixin-dexamethasone (MAXITROL) 0.1 % ophthalmic suspension 1 drop, Both Eyes, 4 TIMES DAILY     neomycin-polymyxin-dexamethasone (MAXITROL) 3.5-59812-1.1 SUSP ophthalmic susp 1-2 drops, Both Eyes, 4 TIMES DAILY     ondansetron (ZOFRAN ODT) 4 mg, Oral, EVERY 12 HOURS PRN     senna-docusate (SENOKOT-S/PERICOLACE) 8.6-50 MG tablet 2 tablets, Oral, 2 TIMES DAILY     spironolactone (ALDACTONE) 50 mg, Oral, DAILY     triamcinolone (KENALOG) 0.1 % external ointment Topical, 2 TIMES DAILY     Vaginal Lubricant (REPHRESH) GEL 2 g, Vaginal, EVERY 3 DAYS     vitamin D3 6,000 Units, Oral, DAILY       Exam  /80   Pulse 89   SpO2 98%   The abdomen soft nontender.  There is a palpable hernia defect that protrudes to the right that is easily reducible.  She has a former transverse scar that cephalad to where the hernia is.      I spent 30 minutes with the patient reviewing charts reviewing CT scans and doing a physical exam.

## 2022-05-17 ENCOUNTER — PATIENT OUTREACH (OUTPATIENT)
Dept: SURGERY | Facility: CLINIC | Age: 72
End: 2022-05-17

## 2022-05-17 DIAGNOSIS — M79.671 RIGHT FOOT PAIN: Primary | ICD-10-CM

## 2022-05-17 DIAGNOSIS — K43.2 INCISIONAL HERNIA, WITHOUT OBSTRUCTION OR GANGRENE: Primary | ICD-10-CM

## 2022-05-17 RX ORDER — CLINDAMYCIN PHOSPHATE 900 MG/50ML
900 INJECTION, SOLUTION INTRAVENOUS SEE ADMIN INSTRUCTIONS
Status: CANCELLED | OUTPATIENT
Start: 2022-05-17

## 2022-05-17 RX ORDER — CLINDAMYCIN PHOSPHATE 900 MG/50ML
900 INJECTION, SOLUTION INTRAVENOUS
Status: CANCELLED | OUTPATIENT
Start: 2022-05-17

## 2022-05-17 NOTE — TELEPHONE ENCOUNTER
Walk-in hearing aid services on 5/16/22: The patient needed the make, model and serial number information of her hearing aids to provide to a third party insurer.  This information was provided to her today.  Her hearing aids were cleaned and the  filters and domes were replaced.  They were found to be working normally.

## 2022-05-17 NOTE — PROGRESS NOTES
Patients chart reviewed with Dr. Encarnacion's colleagues. Planning hernia repair when cleared from Ortho- Dr. Ramirez.    Discussed with the patient and she will contact this office when she is cleared.    Teaching information sent to patient via [employer]    [facility] ventral hernia  [place]  PAC  Magnesium Citrate Bowel prep- NOT NEEDED per Dr. Encarnacion  120

## 2022-05-18 ENCOUNTER — TELEPHONE (OUTPATIENT)
Dept: OPHTHALMOLOGY | Facility: CLINIC | Age: 72
End: 2022-05-18

## 2022-05-18 ENCOUNTER — ANCILLARY PROCEDURE (OUTPATIENT)
Dept: CT IMAGING | Facility: CLINIC | Age: 72
End: 2022-05-18
Attending: FAMILY MEDICINE
Payer: COMMERCIAL

## 2022-05-18 ENCOUNTER — OFFICE VISIT (OUTPATIENT)
Dept: ORTHOPEDICS | Facility: CLINIC | Age: 72
End: 2022-05-18
Payer: COMMERCIAL

## 2022-05-18 VITALS — BODY MASS INDEX: 25.58 KG/M2 | HEIGHT: 67 IN | WEIGHT: 163 LBS

## 2022-05-18 VITALS — WEIGHT: 163 LBS | BODY MASS INDEX: 25.58 KG/M2 | HEIGHT: 67 IN

## 2022-05-18 DIAGNOSIS — Z98.1 HISTORY OF LUMBAR FUSION: ICD-10-CM

## 2022-05-18 DIAGNOSIS — M80.08XG PATHOLOGICAL FRACTURE OF VERTEBRA DUE TO AGE-RELATED OSTEOPOROSIS WITH DELAYED HEALING, SUBSEQUENT ENCOUNTER: Primary | ICD-10-CM

## 2022-05-18 DIAGNOSIS — S39.92XA LOWER BACK INJURY, INITIAL ENCOUNTER: ICD-10-CM

## 2022-05-18 DIAGNOSIS — S82.831D CLOSED FRACTURE OF DISTAL END OF RIGHT FIBULA WITH ROUTINE HEALING, UNSPECIFIED FRACTURE MORPHOLOGY, SUBSEQUENT ENCOUNTER: Primary | ICD-10-CM

## 2022-05-18 DIAGNOSIS — S92.901D CLOSED FRACTURE OF RIGHT FOOT WITH ROUTINE HEALING, SUBSEQUENT ENCOUNTER: ICD-10-CM

## 2022-05-18 PROCEDURE — 72131 CT LUMBAR SPINE W/O DYE: CPT | Performed by: RADIOLOGY

## 2022-05-18 PROCEDURE — 99207 PR DROP WITH A PROCEDURE: CPT | Performed by: FAMILY MEDICINE

## 2022-05-18 PROCEDURE — 99213 OFFICE O/P EST LOW 20 MIN: CPT | Performed by: FAMILY MEDICINE

## 2022-05-18 PROCEDURE — 96372 THER/PROPH/DIAG INJ SC/IM: CPT | Mod: 50 | Performed by: FAMILY MEDICINE

## 2022-05-18 NOTE — PROGRESS NOTES
Follow-up right foot and right distal fibular tip fractures      DOI 5/4/22, two weeks ago, Right thumb tuft fracture, nondisplaced.  Right ankle distal fibular fracture nondisplaced, right foot distal fifth and fourth metatarsal fractures nondisplaced.     She avoids nonsteroidal anti-inflammatories.  She usesTylenol for pain.      She has a walker to assist with walking.  She was given a post op shoe for weightbearing as tolerated with the help of the walker.  She was given a Stax splint to protect the thumb distal digit.    PMH:  Past Medical History:   Diagnosis Date     Arthritis      Bone disease      Breast cancer (H)      H/O kyphoplasty      Hearing problem      History of kidney stones      History of radiation therapy      Hyperlipemia      Hypertension      Hypopotassemia      Kidney problem      Lymph edema      Medullary sponge kidney      Osteopenia      PONV (postoperative nausea and vomiting)      Reduced vision      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     Thyroid disease        Active problem list:  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     Corneal opacity     Hypothyroidism     Osteopenia, unspecified location     Aromatase inhibitor use     Chronic pain of right knee     DDD (degenerative disc disease), lumbar     Degenerative scoliosis in adult patient     Peripheral neuropathy     Spinal stenosis of lumbar region with neurogenic claudication     Spondylolisthesis of lumbar  region     Brain lesion     Dermatochalasis of both upper eyelids     S/P lumbar fusion     Chronic bilateral low back pain     PVD (posterior vitreous detachment), left eye     Vitreous opacities of left eye       FH:  Family History   Problem Relation Age of Onset     Neurologic Disorder Mother         Anuerysm of Cerebral Artery, Dementia     Diabetes Mother      Thyroid Disease Mother         ,     Cerebrovascular Disease Mother      Dementia Mother      Osteoporosis Mother      Heart Disease Father         AAA     Hypertension Father      Circulatory Brother         Perihperal Neurophathy     Diabetes Maternal Grandmother      Asthma Maternal Grandmother      Chronic Obstructive Pulmonary Disease Maternal Grandfather         father     Asthma Maternal Grandfather      Diabetes Maternal Aunt         x2     Melanoma Maternal Aunt      Glaucoma Maternal Aunt      Breast Cancer Cousin      Dementia Other      Cancer Other         malignant melanoma     Hypertension Other      Hypertension Other      Cerebrovascular Disease Other      Cerebrovascular Disease Other      Obesity Other      Respiratory Other      Cancer Other      Diabetes Other      Asthma Other      Macular Degeneration No family hx of      Coronary Artery Disease No family hx of      Hyperlipidemia No family hx of      Kidney Disease No family hx of      Thrombosis No family hx of      Arthritis No family hx of      Depression No family hx of      Mental Illness No family hx of      Substance Abuse No family hx of      Cystic Fibrosis No family hx of      Early Death No family hx of      Coronary Artery Disease Early Onset No family hx of      Heart Failure No family hx of      Bleeding Diathesis No family hx of      Ovarian Cancer No family hx of      Uterine Cancer No family hx of      Prostate Cancer No family hx of      Colorectal Cancer No family hx of      Pancreatic Cancer No family hx of      Lung Cancer No family hx of      Other Cancer No  family hx of      Autoimmune Disease No family hx of      Unknown/Adopted No family hx of      Genetic Disorder No family hx of      Bleeding Disorder No family hx of      Clotting Disorder No family hx of      Anesthesia Reaction No family hx of        SH:  Social History     Socioeconomic History     Marital status:      Spouse name: Not on file     Number of children: Not on file     Years of education: Not on file     Highest education level: Not on file   Occupational History     Not on file   Tobacco Use     Smoking status: Never Smoker     Smokeless tobacco: Never Used   Substance and Sexual Activity     Alcohol use: Not Currently     Alcohol/week: 7.0 standard drinks     Types: 7 Glasses of wine per week     Comment: Rare to occasional     Drug use: Yes     Types: Marijuana     Sexual activity: Not Currently     Partners: Male     Birth control/protection: Abstinence   Other Topics Concern      Service No     Blood Transfusions Not Asked     Caffeine Concern No     Occupational Exposure No     Hobby Hazards No     Sleep Concern No     Stress Concern No     Weight Concern No     Special Diet No     Back Care No     Exercise Yes     Comment: walks 4-6x  week for 20-30 min. each     Bike Helmet Not Asked     Seat Belt Yes     Self-Exams Yes     Parent/sibling w/ CABG, MI or angioplasty before 65F 55M? No   Social History Narrative    .  Recently retired. 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        She lives with a renter.         She exercises 50 minutes three times a week.     Social Determinants of Health     Financial Resource Strain: Not on file   Food Insecurity: Not on file   Transportation Needs: Not on file   Physical Activity: Not on file   Stress: Not on file   Social Connections: Not on file   Intimate Partner Violence: Not on file   Housing Stability: Not on file       MEDS:  See EMR,  reviewed  ALL:  See EMR, reviewed    REVIEW OF SYSTEMS:  CONSTITUTIONAL:NEGATIVE for fever, chills, change in weight  INTEGUMENTARY/SKIN: NEGATIVE for worrisome rashes, moles or lesions  EYES: NEGATIVE for vision changes or irritation  ENT/MOUTH: NEGATIVE for ear, mouth and throat problems  RESP:NEGATIVE for significant cough or SOB  BREAST: NEGATIVE for masses, tenderness or discharge  CV: NEGATIVE for chest pain, palpitations or peripheral edema  GI: NEGATIVE for nausea, abdominal pain, heartburn, or change in bowel habits  :NEGATIVE for frequency, dysuria, or hematuria  :NEGATIVE for frequency, dysuria, or hematuria  NEURO: NEGATIVE for weakness, dizziness or paresthesias  ENDOCRINE: NEGATIVE for temperature intolerance, skin/hair changes  HEME/ALLERGY/IMMUNE: NEGATIVE for bleeding problems  PSYCHIATRIC: NEGATIVE for changes in mood or affect      Objective: She still has tenderness in the area of the fifth and fourth distal metatarsals on the right.  Overlying skin is intact.  She has a well fitting postop shoe.  Sensation is intact distally.  Her thumb is not reexamined today.  She has a well fitting stack splint.    Reviewed x-rays of the right foot to continue to show the distal fifth and fourth metatarsal head fractures.  She has some mild angulation dorsally with the distal fifth.  Otherwise there is no significant displacement.    Assessment right-sided foot distal fifth and fourth metatarsal head fractures x 2 wks.  Osteoporosis.  Right thumb tuft fracture.    Plan: She will continue with the postop shoe for the next 3 weeks.  We will have x-rays of the thumb and the foot taken again in 3 weeks.  All of her x-rays over read by Dr. Clark to make sure there is no surgery needed for the fifth metatarsal head fracture.

## 2022-05-18 NOTE — TELEPHONE ENCOUNTER
Rescheduled 05/18   Patient is scheduled for surgery with Dr. Felix Pizano     Spoke with: Ismael     Date of Surgery: 06/20     Location: [facility]:  [address] [place] [address]     H&P will be completed at: PCP     COVID testing: [facility]    Post Op scheduled on 06/21, and 06/28     Surgery packet was mailed 05/18     Additional comments: Advised RN will call 1 - 3 business days prior with arrival time and instructions. Informed patient they will need an adult  and responsible adult to stay with for 24 hours following surgery

## 2022-05-18 NOTE — LETTER
5/18/2022      RE: Nadira Perez  [id number]  [facility] [address]     Dear Colleague,    Thank you for referring your patient, Nadira Perez, to the [facility] [place]. Please see a copy of my visit note below.    Follow-up right foot and right distal fibular tip fractures    DOI 5/4/22, two weeks ago, Right thumb tuft fracture, nondisplaced.  Right ankle distal fibular fracture nondisplaced, right foot distal fifth and fourth metatarsal fractures nondisplaced.     She avoids nonsteroidal anti-inflammatories.  She usesTylenol for pain.      She has a walker to assist with walking.  She was given a post op shoe for weightbearing as tolerated with the help of the walker.  She was given a Stax splint to protect the thumb distal digit.    PMH:  Past Medical History:   Diagnosis Date     Arthritis      Bone disease      Breast cancer (H)      H/O kyphoplasty      Hearing problem      History of kidney stones      History of radiation therapy      Hyperlipemia      Hypertension      Hypopotassemia      Kidney problem      Lymph edema      Medullary sponge kidney      Osteopenia      PONV (postoperative nausea and vomiting)      Reduced vision      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     Thyroid disease        Active problem list:  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     Corneal opacity     Hypothyroidism     Osteopenia, unspecified location      Aromatase inhibitor use     Chronic pain of right knee     DDD (degenerative disc disease), lumbar     Degenerative scoliosis in adult patient     Peripheral neuropathy     Spinal stenosis of lumbar region with neurogenic claudication     Spondylolisthesis of lumbar region     Brain lesion     Dermatochalasis of both upper eyelids     S/P lumbar fusion     Chronic bilateral low back pain     PVD (posterior vitreous detachment), left eye     Vitreous opacities of left eye       FH:  Family History   Problem Relation Age of Onset     Neurologic Disorder Mother         Anuerysm of Cerebral Artery, Dementia     Diabetes Mother      Thyroid Disease Mother         ,     Cerebrovascular Disease Mother      Dementia Mother      Osteoporosis Mother      Heart Disease Father         AAA     Hypertension Father      Circulatory Brother         Perihperal Neurophathy     Diabetes Maternal Grandmother      Asthma Maternal Grandmother      Chronic Obstructive Pulmonary Disease Maternal Grandfather         father     Asthma Maternal Grandfather      Diabetes Maternal Aunt         x2     Melanoma Maternal Aunt      Glaucoma Maternal Aunt      Breast Cancer Cousin      Dementia Other      Cancer Other         malignant melanoma     Hypertension Other      Hypertension Other      Cerebrovascular Disease Other      Cerebrovascular Disease Other      Obesity Other      Respiratory Other      Cancer Other      Diabetes Other      Asthma Other      Macular Degeneration No family hx of      Coronary Artery Disease No family hx of      Hyperlipidemia No family hx of      Kidney Disease No family hx of      Thrombosis No family hx of      Arthritis No family hx of      Depression No family hx of      Mental Illness No family hx of      Substance Abuse No family hx of      Cystic Fibrosis No family hx of      Early Death No family hx of      Coronary Artery Disease Early Onset No family hx of      Heart Failure No family hx of      Bleeding  Diathesis No family hx of      Ovarian Cancer No family hx of      Uterine Cancer No family hx of      Prostate Cancer No family hx of      Colorectal Cancer No family hx of      Pancreatic Cancer No family hx of      Lung Cancer No family hx of      Other Cancer No family hx of      Autoimmune Disease No family hx of      Unknown/Adopted No family hx of      Genetic Disorder No family hx of      Bleeding Disorder No family hx of      Clotting Disorder No family hx of      Anesthesia Reaction No family hx of        SH:  Social History     Socioeconomic History     Marital status:      Spouse name: Not on file     Number of children: Not on file     Years of education: Not on file     Highest education level: Not on file   Occupational History     Not on file   Tobacco Use     Smoking status: Never Smoker     Smokeless tobacco: Never Used   Substance and Sexual Activity     Alcohol use: Not Currently     Alcohol/week: 7.0 standard drinks     Types: 7 Glasses of wine per week     Comment: Rare to occasional     Drug use: Yes     Types: Marijuana     Sexual activity: Not Currently     Partners: Male     Birth control/protection: Abstinence   Other Topics Concern      Service No     Blood Transfusions Not Asked     Caffeine Concern No     Occupational Exposure No     Hobby Hazards No     Sleep Concern No     Stress Concern No     Weight Concern No     Special Diet No     Back Care No     Exercise Yes     Comment: walks 4-6x  week for 20-30 min. each     Bike Helmet Not Asked     Seat Belt Yes     Self-Exams Yes     Parent/sibling w/ CABG, MI or angioplasty before 65F 55M? No   Social History Narrative    .  Recently retired. 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        She lives with a renter.         She exercises 50 minutes three times a week.     Social Determinants of Health     Financial Resource Strain: Not on  file   Food Insecurity: Not on file   Transportation Needs: Not on file   Physical Activity: Not on file   Stress: Not on file   Social Connections: Not on file   Intimate Partner Violence: Not on file   Housing Stability: Not on file       MEDS:  See EMR, reviewed  ALL:  See EMR, reviewed    REVIEW OF SYSTEMS:  CONSTITUTIONAL:NEGATIVE for fever, chills, change in weight  INTEGUMENTARY/SKIN: NEGATIVE for worrisome rashes, moles or lesions  EYES: NEGATIVE for vision changes or irritation  ENT/MOUTH: NEGATIVE for ear, mouth and throat problems  RESP:NEGATIVE for significant cough or SOB  BREAST: NEGATIVE for masses, tenderness or discharge  CV: NEGATIVE for chest pain, palpitations or peripheral edema  GI: NEGATIVE for nausea, abdominal pain, heartburn, or change in bowel habits  :NEGATIVE for frequency, dysuria, or hematuria  :NEGATIVE for frequency, dysuria, or hematuria  NEURO: NEGATIVE for weakness, dizziness or paresthesias  ENDOCRINE: NEGATIVE for temperature intolerance, skin/hair changes  HEME/ALLERGY/IMMUNE: NEGATIVE for bleeding problems  PSYCHIATRIC: NEGATIVE for changes in mood or affect      Objective: She still has tenderness in the area of the fifth and fourth distal metatarsals on the right.  Overlying skin is intact.  She has a well fitting postop shoe.  Sensation is intact distally.  Her thumb is not reexamined today.  She has a well fitting stack splint.    Reviewed x-rays of the right foot to continue to show the distal fifth and fourth metatarsal head fractures.  She has some mild angulation dorsally with the distal fifth.  Otherwise there is no significant displacement.    Assessment right-sided foot distal fifth and fourth metatarsal head fractures x 2 wks.  Osteoporosis.  Right thumb tuft fracture.    Plan: She will continue with the postop shoe for the next 3 weeks.  We will have x-rays of the thumb and the foot taken again in 3 weeks.  All of her x-rays over read by Dr. Clark to make sure  there is no surgery needed for the fifth metatarsal head fracture.    Again, thank you for allowing me to participate in the care of your patient.      Sincerely,    Chaitanya Miguel MD

## 2022-05-18 NOTE — LETTER
"  5/18/2022      RE: Nadira Perez  [address]  [facility] [address]     Dear Colleague,    Thank you for referring your patient, Nadira Perez, to the [facility]. Please see a copy of my visit note below.      Assessment & Plan     Pathological fracture of vertebra due to age-related osteoporosis with delayed healing, subsequent encounter  Evenity #1  - romosozumab-aqqg (EVENITY) injection 210 mg             BMI:   Estimated body mass index is 25.53 kg/m  as calculated from the following:    Height as of this encounter: 1.702 m (5' 7\").    Weight as of this encounter: 73.9 kg (163 lb).   Weight management plan: recover from injuries and then worry about exercises    See Patient Instructions    Return in about 4 weeks (around 6/15/2022), or if symptoms worsen or fail to improve.    Cortney Clark MD  [facility]    Tom Guevara is a 71 year old who presents for the following health issues  accompanied by her self.    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  Pt suffered a fall in a [employer] parking lot.  She has an appt with Dr. Miguel later today.    Review of Systems   Constitutional, HEENT, cardiovascular, pulmonary, gi and gu systems are negative, except as otherwise noted.      Objective    Ht 1.702 m (5' 7\")   Wt 73.9 kg (163 lb)   BMI 25.53 kg/m    Body mass index is 25.53 kg/m .  Physical Exam   NAD  nonlabored breathing        Procedure: Risks and benefits were discussed and patient was consented for Evenity injections.  Patient is agreeable to Evenity 210 mg subcutaneous injections into bilateral thighs.  Patient will be monitored closely for the next 20 minutes this is her first injection.  Band-Aids were placed without any complications.  Patient was informed of potential side effects to watch for.      Again, thank you for allowing me to participate in " the care of your patient.      Sincerely,    Cortney lCark MD

## 2022-05-18 NOTE — PROGRESS NOTES
"  Assessment & Plan     Pathological fracture of vertebra due to age-related osteoporosis with delayed healing, subsequent encounter  Evenity #1  - romosozumab-aqqg (EVENITY) injection 210 mg             BMI:   Estimated body mass index is 25.53 kg/m  as calculated from the following:    Height as of this encounter: 1.702 m (5' 7\").    Weight as of this encounter: 73.9 kg (163 lb).   Weight management plan: recover from injuries and then worry about exercises    See Patient Instructions    Return in about 4 weeks (around 6/15/2022), or if symptoms worsen or fail to improve.    Cortney Clark MD  [facility]    Tom Guevara is a 71 year old who presents for the following health issues  accompanied by her self.    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  Pt suffered a fall in a [employer] parking lot.  She has an appt with Dr. Miguel later today.    Review of Systems   Constitutional, HEENT, cardiovascular, pulmonary, gi and gu systems are negative, except as otherwise noted.      Objective    Ht 1.702 m (5' 7\")   Wt 73.9 kg (163 lb)   BMI 25.53 kg/m    Body mass index is 25.53 kg/m .  Physical Exam   NAD  nonlabored breathing              Procedure: Risks and benefits were discussed and patient was consented for Evenity injections.  Patient is agreeable to Evenity 210 mg subcutaneous injections into bilateral thighs.  Patient will be monitored closely for the next 20 minutes this is her first injection.  Band-Aids were placed without any complications.  Patient was informed of potential side effects to watch for.  "

## 2022-05-19 ENCOUNTER — MYC MEDICAL ADVICE (OUTPATIENT)
Dept: ORTHOPEDICS | Facility: CLINIC | Age: 72
End: 2022-05-19
Payer: COMMERCIAL

## 2022-05-19 NOTE — TELEPHONE ENCOUNTER
11/18/21 OLIF/ perc screws L5-S1 (Dr. Ramirez)  5/3/22 last clinic appointment, was doing well    Called patient: she fell May 5,2022: 2 metatarsal fractures, thumb fx, ring finger fx, fibula fx. Left lumbar pain has been worse this past week , intermittent pain shooting down the leg. No weakness. Also thoracic pain, stable  Taking vicodin 1-2 per day prn, Robaxin prn, Gabapentin 300 TID, Tylenol 1000 BID. Hasn't been taking NSAIDs since fall as she is worried about causing interference with fracture bone healing. Discussed options including local OTC pain patches, trying medrol dose pack, taking NSAIDs infrequently. She is ok waiting until Monday for appointment.    Alexa Merino PA-C

## 2022-05-23 ENCOUNTER — OFFICE VISIT (OUTPATIENT)
Dept: ORTHOPEDICS | Facility: CLINIC | Age: 72
End: 2022-05-23
Payer: COMMERCIAL

## 2022-05-23 VITALS — HEIGHT: 67 IN | BODY MASS INDEX: 25.58 KG/M2 | WEIGHT: 163 LBS

## 2022-05-23 DIAGNOSIS — M54.16 LUMBAR RADICULOPATHY: Primary | ICD-10-CM

## 2022-05-23 PROCEDURE — 99213 OFFICE O/P EST LOW 20 MIN: CPT | Performed by: ORTHOPAEDIC SURGERY

## 2022-05-23 ASSESSMENT — ENCOUNTER SYMPTOMS
HYPOTENSION: 0
TREMORS: 1
LOSS OF CONSCIOUSNESS: 0
JAUNDICE: 0
PARALYSIS: 0
NECK MASS: 0
INSOMNIA: 1
DIFFICULTY URINATING: 1
STIFFNESS: 1
SPEECH CHANGE: 0
POSTURAL DYSPNEA: 0
HEMOPTYSIS: 0
SEIZURES: 0
BLOATING: 0
NAUSEA: 1
SNORES LOUDLY: 1
EYE REDNESS: 0
NUMBNESS: 1
SHORTNESS OF BREATH: 0
TROUBLE SWALLOWING: 1
SPUTUM PRODUCTION: 0
CONSTIPATION: 1
LEG PAIN: 1
ARTHRALGIAS: 1
WHEEZING: 0
DOUBLE VISION: 0
ABDOMINAL PAIN: 1
SINUS PAIN: 0
BOWEL INCONTINENCE: 0
DIARRHEA: 0
DYSPNEA ON EXERTION: 0
DIZZINESS: 1
DECREASED CONCENTRATION: 0
MUSCLE WEAKNESS: 1
EYE PAIN: 0
PALPITATIONS: 1
HOT FLASHES: 0
ORTHOPNEA: 0
JOINT SWELLING: 1
COUGH: 0
FLANK PAIN: 1
EYE WATERING: 0
HEARTBURN: 0
SLEEP DISTURBANCES DUE TO BREATHING: 0
RECTAL PAIN: 0
PANIC: 0
EXERCISE INTOLERANCE: 1
SYNCOPE: 0
WEAKNESS: 1
MUSCLE CRAMPS: 1
HEMATURIA: 0
MEMORY LOSS: 0
EYE IRRITATION: 1
BLOOD IN STOOL: 0
SINUS CONGESTION: 0
TASTE DISTURBANCE: 0
NECK PAIN: 1
MYALGIAS: 1
DISTURBANCES IN COORDINATION: 1
BACK PAIN: 1
TINGLING: 1
SMELL DISTURBANCE: 0
NERVOUS/ANXIOUS: 0
HYPERTENSION: 1
DECREASED LIBIDO: 1
SORE THROAT: 0
HOARSE VOICE: 0
DYSURIA: 0
DEPRESSION: 0
LIGHT-HEADEDNESS: 1
HEADACHES: 0
VOMITING: 0
COUGH DISTURBING SLEEP: 0

## 2022-05-23 NOTE — LETTER
5/23/2022         RE: Nadira Perez  [id number]  [facility] [address]        Dear Colleague,    Thank you for referring your patient, Nadira Perez, to the [facility] [place]. Please see a copy of my visit note below.    Spine Surgery Return Clinic Visit      Chief Complaint:   RECHECK (Follow up CT scan completed on 5/18/22)      Interval HPI:  Symptom Profile Including: location of symptoms, onset, severity, exacerbating/alleviating factors, previous treatments:        Nadira Perez is a 71 year old female who I last saw about 6 weeks ago.  Unfortunately about a week and a half ago she had a significant fall where she broke multiple metatarsals in her right foot, had significant bruising in the right leg, sustained a nondisplaced right fibular fracture, and also broke part of thumb and index finger.  She is in braces for all of these injuries.  She also had exacerbated back pain and wanted to get a scan to make sure that her lumbar fusion was okay.    Today she says the low back is not hurting too much.  Occasionally she is having some soreness on the left side around the incision.  It was really quite bad after the fall but seems to have lessened somewhat.  She is ambulatory with the aid of her walker.          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     Thyroid disease             Past Surgical History:     Past Surgical History:   Procedure Laterality Date     ABDOMEN SURGERY      ovarian cyst, mesh     ARTHRODESIS  WRIST Right      ARTHRODESIS WRIST  02/14/2013    Procedure: ARTHRODESIS WRIST;  left wrist scaphoid excision, four bone fusion, iliac crest bone graft  ( Mac with block);  Surgeon: Av Mendez MD;  [facility]     BIOPSY      skin, vaginal     BLEPHAROPLASTY BILATERAL Bilateral 5/6/2022    Procedure: UPPER BLEPHAROPLASTY, BILATERAL;  Surgeon: Jemal Sanchez MD;  Location: [facility] OR     CATARACT IOL, RT/LT Right 03/13/2018     CATARACT IOL, RT/LT Left 02/20/2018     COLONOSCOPY  12/24/2013    Procedure: COMBINED COLONOSCOPY, SINGLE BIOPSY/POLYPECTOMY BY BIOPSY;  COLONOSCOPY;  Surgeon: Dom Alvarez MD;  Location:  GI     COSMETIC BLEPHAROPLASTY LOWER LIDS BILATERAL Bilateral 5/6/2022    Procedure: BLEPHAROPLASTY, LOWER EYELID, BILATERAL, COSMETIC;  Surgeon: Jemal Sanchez MD;  Location: [facility] OR     ESOPHAGOSCOPY, GASTROSCOPY, DUODENOSCOPY (EGD), COMBINED N/A 11/23/2016    Procedure: COMBINED ESOPHAGOSCOPY, GASTROSCOPY, DUODENOSCOPY (EGD);  Surgeon: Quinten Feliciano MD;  Location:  GI     EXTERNAL EAR SURGERY      right     EYE SURGERY      radial keratomy     FUSION, SPINE, INTERBODY, OBLIQUE ANTERIOR AND LUMBAR, 2 LEVELS, POST APPROACH, USING OTS N/A 11/18/2021    Procedure: Part 1: Oblique Anterior Interbody Fusion at Lumbar 5 to sacral 1 with use of Bone Morphogenic Protein,;  Surgeon: Serge Ramirez MD;  Location: [facility]     GRAFT BONE FROM ILIAC CREST  02/14/2013    Procedure: GRAFT BONE FROM ILIAC CREST;  mac with block and local infilitration;  Surgeon: Av Mendez MD;  [facility]: [facility]     HC BREATH HYDROGEN TEST N/A 10/14/2016    Procedure: HYDROGEN BREATH TEST;  Surgeon: Cheri Barron MD;  Location:  GI     HERNIA REPAIR      umbilical age 18 mos.     HYSTERECTOMY TOTAL ABDOMINAL  05/03/2000     MASTECTOMY MODIFIED RADICAL Bilateral     bilateral; right breast prophylactic     OPTICAL TRACKING SYSTEM FUSION POSTERIOR SPINE LUMBAR N/A  11/18/2021    Procedure: Open Posterior Instrumented Spinal Fusion at Lumbar 5 to Sacral 1, with grimm aguayo osteotomy, use of O-Arm/Stealth;  Surgeon: Serge Ramirez MD;  Location: [facility]     PARATHYROIDECTOMY  09/23/2004    R SUPERIOR     PARATHYROIDECTOMY  09/23/2004    parathyroid resection, subtotal     RELEASE CARPAL TUNNEL Right 12/2/2021    Procedure: RIGHT CARPAL TUNNEL RELEASE, RIGHT WRIST HARDWARE REMOVAL, RIGHT CARPAL BOSS EXCISION;  Surgeon: Rolan Conley MD;  Location: [facility] OR     REMOVE HARDWARE HAND  09/24/2013    Procedure: REMOVE HARDWARE HAND;  Left Hand Screw Removal        RHINOPLASTY  1968     thyr proc skin closed cosmetic manner by subcuticular stitch  01/23/2009     THYROPLASTY  10/09/2009     TONSILLECTOMY  1977     WRIST SURGERY      wrist arthrodesis            Social History:     Social History     Tobacco Use     Smoking status: Never Smoker     Smokeless tobacco: Never Used   Substance Use Topics     Alcohol use: Not Currently     Alcohol/week: 7.0 standard drinks     Types: 7 Glasses of wine per week     Comment: Rare to occasional            Family History:     Family History   Problem Relation Age of Onset     Neurologic Disorder Mother         Anuerysm of Cerebral Artery, Dementia     Diabetes Mother      Thyroid Disease Mother         ,     Cerebrovascular Disease Mother      Dementia Mother      Osteoporosis Mother      Heart Disease Father         AAA     Hypertension Father      Circulatory Brother         Perihperal Neurophathy     Diabetes Maternal Grandmother      Asthma Maternal Grandmother      Chronic Obstructive Pulmonary Disease Maternal Grandfather         father     Asthma Maternal Grandfather      Diabetes Maternal Aunt         x2     Melanoma Maternal Aunt      Glaucoma Maternal Aunt      Breast Cancer Cousin      Dementia Other      Cancer Other         malignant melanoma     Hypertension Other      Hypertension Other      Cerebrovascular Disease Other       Cerebrovascular Disease Other      Obesity Other      Respiratory Other      Cancer Other      Diabetes Other      Asthma Other      Macular Degeneration No family hx of      Coronary Artery Disease No family hx of      Hyperlipidemia No family hx of      Kidney Disease No family hx of      Thrombosis No family hx of      Arthritis No family hx of      Depression No family hx of      Mental Illness No family hx of      Substance Abuse No family hx of      Cystic Fibrosis No family hx of      Early Death No family hx of      Coronary Artery Disease Early Onset No family hx of      Heart Failure No family hx of      Bleeding Diathesis No family hx of      Ovarian Cancer No family hx of      Uterine Cancer No family hx of      Prostate Cancer No family hx of      Colorectal Cancer No family hx of      Pancreatic Cancer No family hx of      Lung Cancer No family hx of      Other Cancer No family hx of      Autoimmune Disease No family hx of      Unknown/Adopted No family hx of      Genetic Disorder No family hx of      Bleeding Disorder No family hx of      Clotting Disorder No family hx of      Anesthesia Reaction No family hx of             Allergies:     Allergies   Allergen Reactions     Erythromycin Nausea     Penicillins Hives     Around age 6 - doesn't recall full reaction, mother told her it was hives.     Has tolerated cephalsporins.             Medications:     Current Outpatient Medications   Medication     acetaminophen (TYLENOL) 500 MG tablet     amLODIPine (NORVASC) 10 MG tablet     Ascorbic Acid (VITAMIN C) 500 MG CHEW     atorvastatin (LIPITOR) 80 MG tablet     bacitracin 500 UNIT/GM ophthalmic ointment     Cholecalciferol (VITAMIN D3) 50 MCG (2000 UT) CAPS     CRANBERRY EXTRACT PO     diclofenac (VOLTAREN) 1 % topical gel     erythromycin (ROMYCIN) 5 MG/GM ophthalmic ointment     gabapentin (NEURONTIN) 300 MG capsule     HYDROcodone-acetaminophen (NORCO) 5-325 MG tablet     hydrOXYzine (ATARAX) 10  "MG tablet     hydrOXYzine (VISTARIL) 25 MG capsule     ibuprofen (ADVIL/MOTRIN) 600 MG tablet     levothyroxine (SYNTHROID/LEVOTHROID) 112 MCG tablet     lisinopril (ZESTRIL) 40 MG tablet     melatonin 5 MG tablet     methocarbamol (ROBAXIN) 500 MG tablet     metoprolol succinate ER (TOPROL-XL) 50 MG 24 hr tablet     Multiple Vitamin (MULTI-VITAMIN) per tablet     naloxone (NARCAN) 4 MG/0.1ML nasal spray     neomycin-polymixin-dexamethasone (MAXITROL) 0.1 % ophthalmic suspension     ondansetron (ZOFRAN-ODT) 4 MG ODT tab     senna-docusate (SENOKOT-S/PERICOLACE) 8.6-50 MG tablet     spironolactone (ALDACTONE) 25 MG tablet     triamcinolone (KENALOG) 0.1 % external ointment     Vaginal Lubricant (REPHRESH) GEL     Current Facility-Administered Medications   Medication     lidocaine 1 % injection 1 mL     lidocaine 1 % injection 1 mL     romosozumab-aqqg (EVENITY) injection 210 mg     triamcinolone (KENALOG-40) injection 40 mg     triamcinolone (KENALOG-40) injection 40 mg             Review of Systems:   A focused musculoskeletal and neurologic ROS was performed with pertinent positives and negatives noted in the HPI.  Additional systems were also reviewed and are documented at the bottom of the note.         Physical Exam:   Vitals: Ht 1.702 m (5' 7\")   Wt 73.9 kg (163 lb)   BMI 25.53 kg/m    Musculoskeletal, Neurologic, and Spine:            Lumbar Spine:    Appearance - No gross stepoffs or deformities    Motor -     L2-3: Hip flexion 5/5 R and 5/5 L strength          L3/4:  Knee extension R 5/5 and L 5/5 strength         L4/5:  Foot dorsiflexion R 4/5L 5/5 and       EHL dorsiflexion R cannot examine due to brace in place L 4/5 strength         S1:  Plantarflexion/Peroneal Muscles  R 5/5 and L 5/5 strength    Sensation: intact to light touch L3-S1 distribution BLE          Hip Exam:  No pain with hip log roll and no tenderness over the greater trochanters.    Alignment:  Patient stands with a neutral standing " sagittal balance.           Imaging:   We ordered and independently reviewed new radiographs at this clinic visit. The results were discussed with the patient. Findings include:     CT shows no obvious new fractures       Assessment and Plan:     71 year old female with back pain after traumatic fall.  CT negative.  Given that she is improving we will stay the course.  If she does not improve in 1-2 weeks we will order a cadaul GREGOR injection and she will send a my chart if this is needed.  Oherwise will follow up as scheduled for final CT visit in 6 months.             Respectfully,  Serge Ramirez MD  Spine Surgery  [facility]    Answers for HPI/ROS submitted by the patient on 5/23/2022  General Symptoms: No  Skin Symptoms: No  HENT Symptoms: Yes  EYE SYMPTOMS: Yes  HEART SYMPTOMS: Yes  LUNG SYMPTOMS: Yes  INTESTINAL SYMPTOMS: Yes  URINARY SYMPTOMS: Yes  GYNECOLOGIC SYMPTOMS: Yes  BREAST SYMPTOMS: No  SKELETAL SYMPTOMS: Yes  BLOOD SYMPTOMS: No  NERVOUS SYSTEM SYMPTOMS: Yes  MENTAL HEALTH SYMPTOMS: Yes  Ear pain: No  Ear discharge: No  Hearing loss: Yes  Tinnitus: No  Nosebleeds: No  Congestion: No  Sinus pain: No  Trouble swallowing: Yes   Voice hoarseness: No  Mouth sores: Yes  Sore throat: No  Tooth pain: No  Gum tenderness: No  Bleeding gums: No  Change in taste: No  Change in sense of smell: No  Dry mouth: Yes  Hearing aid used: Yes  Neck lump: No  Eye pain: No  Vision loss: Yes  Dry eyes: Yes  Watery eyes: No  Eye bulging: No  Double vision: No  Flashing of lights: No  Spots: Yes  Floaters: Yes  Redness: No  Crossed eyes: No  Tunnel Vision: No  Yellowing of eyes: No  Eye irritation: Yes  Chest pain or pressure: No  Fast or irregular heartbeat: Yes  Pain in legs with walking: Yes  Trouble breathing while lying down: No  Fingers or toes appear blue: No  High blood pressure: Yes  Low blood pressure: No  Fainting: No  Murmurs: Yes  Pacemaker: No  Varicose veins: No  Edema or swelling:  Yes  Wake up at night with shortness of breath: No  Light-headedness: Yes  Exercise intolerance: Yes  Cough: No  Sputum or phlegm: No  Coughing up blood: No  Difficulty breating or shortness of breath: No  Snoring: Yes  Wheezing: No  Difficulty breathing on exertion: No  Nighttime Cough: No  Difficulty breathing when lying flat: No  Heart burn or indigestion: No  Nausea: Yes  Vomiting: No  Abdominal pain: Yes  Bloating: No  Constipation: Yes  Diarrhea: No  Blood in stool: No  Black stools: No  Rectal or Anal pain: No  Fecal incontinence: No  Yellowing of skin or eyes: No  Vomit with blood: No  Change in stools: No  Trouble holding urine or incontinence: Yes  Pain or burning: No  Trouble starting or stopping: Yes  Increased frequency of urination: Yes  Blood in urine: No  Decreased frequency of urination: No  Frequent nighttime urination: Yes  Flank pain: Yes  Difficulty emptying bladder: Yes  Bleeding or spotting between periods: No  Heavy or painful periods: Yes  Irregular periods: No  Vaginal discharge: No  Hot flashes: No  Vaginal dryness: Yes  Genital ulcers: No  Reduced libido: Yes  Painful intercourse: Yes  Difficulty with sexual arousal: Yes  Post-menopausal bleeding: No  Back pain: Yes  Muscle aches: Yes  Neck pain: Yes  Swollen joints: Yes  Joint pain: Yes  Bone pain: Yes  Muscle cramps: Yes  Muscle weakness: Yes  Joint stiffness: Yes  Bone fracture: Yes  Trouble with coordination: Yes  Dizziness or trouble with balance: Yes  Fainting or black-out spells: No  Memory loss: No  Headache: No  Seizures: No  Speech problems: No  Tingling: Yes  Tremor: Yes  Weakness: Yes  Difficulty walking: Yes  Paralysis: No  Numbness: Yes  Nervous or Anxious: No  Depression: No  Trouble sleeping: Yes  Trouble thinking or concentrating: No  Mood changes: No  Panic attacks: No

## 2022-05-23 NOTE — PROGRESS NOTES
Spine Surgery Return Clinic Visit      Chief Complaint:   RECHECK (Follow up CT scan completed on 5/18/22)      Interval HPI:  Symptom Profile Including: location of symptoms, onset, severity, exacerbating/alleviating factors, previous treatments:        Nadira Perez is a 71 year old female who I last saw about 6 weeks ago.  Unfortunately about a week and a half ago she had a significant fall where she broke multiple metatarsals in her right foot, had significant bruising in the right leg, sustained a nondisplaced right fibular fracture, and also broke part of thumb and index finger.  She is in braces for all of these injuries.  She also had exacerbated back pain and wanted to get a scan to make sure that her lumbar fusion was okay.    Today she says the low back is not hurting too much.  Occasionally she is having some soreness on the left side around the incision.  It was really quite bad after the fall but seems to have lessened somewhat.  She is ambulatory with the aid of her walker.          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     Thyroid disease             Past Surgical History:     Past Surgical History:   Procedure Laterality Date     ABDOMEN SURGERY      ovarian cyst, mesh     ARTHRODESIS WRIST Right      ARTHRODESIS WRIST  02/14/2013    Procedure: ARTHRODESIS WRIST;  left wrist scaphoid excision, four bone fusion, iliac crest bone graft  ( Mac with block);  Surgeon: Av Mendez MD;  [facility]     BIOPSY      skin, vaginal      BLEPHAROPLASTY BILATERAL Bilateral 5/6/2022    Procedure: UPPER BLEPHAROPLASTY, BILATERAL;  Surgeon: Jemal Sanchez MD;  Location: [facility] OR     CATARACT IOL, RT/LT Right 03/13/2018     CATARACT IOL, RT/LT Left 02/20/2018     COLONOSCOPY  12/24/2013    Procedure: COMBINED COLONOSCOPY, SINGLE BIOPSY/POLYPECTOMY BY BIOPSY;  COLONOSCOPY;  Surgeon: Dom Alvarez MD;  Location:  GI     COSMETIC BLEPHAROPLASTY LOWER LIDS BILATERAL Bilateral 5/6/2022    Procedure: BLEPHAROPLASTY, LOWER EYELID, BILATERAL, COSMETIC;  Surgeon: Jemal aSnchez MD;  Location: [facility] OR     ESOPHAGOSCOPY, GASTROSCOPY, DUODENOSCOPY (EGD), COMBINED N/A 11/23/2016    Procedure: COMBINED ESOPHAGOSCOPY, GASTROSCOPY, DUODENOSCOPY (EGD);  Surgeon: Quinten Feliciano MD;  Location:  GI     EXTERNAL EAR SURGERY      right     EYE SURGERY      radial keratomy     FUSION, SPINE, INTERBODY, OBLIQUE ANTERIOR AND LUMBAR, 2 LEVELS, POST APPROACH, USING OTS N/A 11/18/2021    Procedure: Part 1: Oblique Anterior Interbody Fusion at Lumbar 5 to sacral 1 with use of Bone Morphogenic Protein,;  Surgeon: Serge Ramirez MD;  [facility]     GRAFT BONE FROM ILIAC CREST  02/14/2013    Procedure: GRAFT BONE FROM ILIAC CREST;  mac with block and local infilitration;  Surgeon: Av Mendez MD;  [facility]      BREATH HYDROGEN TEST N/A 10/14/2016    Procedure: HYDROGEN BREATH TEST;  Surgeon: Cheri Barron MD;  Location:  GI     HERNIA REPAIR      umbilical age 18 mos.     HYSTERECTOMY TOTAL ABDOMINAL  05/03/2000     MASTECTOMY MODIFIED RADICAL Bilateral     bilateral; right breast prophylactic     OPTICAL TRACKING SYSTEM FUSION POSTERIOR SPINE LUMBAR N/A 11/18/2021    Procedure: Open Posterior Instrumented Spinal Fusion at Lumbar 5 to Sacral 1, with grimm aguayo osteotomy, use of O-Arm/Stealth;  Surgeon: Serge Ramirez MD;  [facility]     PARATHYROIDECTOMY  09/23/2004    R SUPERIOR      PARATHYROIDECTOMY  09/23/2004    parathyroid resection, subtotal     RELEASE CARPAL TUNNEL Right 12/2/2021    Procedure: RIGHT CARPAL TUNNEL RELEASE, RIGHT WRIST HARDWARE REMOVAL, RIGHT CARPAL BOSS EXCISION;  Surgeon: Rolan Conley MD;  Location: [facility] OR     REMOVE HARDWARE HAND  09/24/2013    Procedure: REMOVE HARDWARE HAND;  Left Hand Screw Removal        RHINOPLASTY  1968     thyr proc skin closed cosmetic manner by subcuticular stitch  01/23/2009     THYROPLASTY  10/09/2009     TONSILLECTOMY  1977     WRIST SURGERY      wrist arthrodesis            Social History:     Social History     Tobacco Use     Smoking status: Never Smoker     Smokeless tobacco: Never Used   Substance Use Topics     Alcohol use: Not Currently     Alcohol/week: 7.0 standard drinks     Types: 7 Glasses of wine per week     Comment: Rare to occasional            Family History:     Family History   Problem Relation Age of Onset     Neurologic Disorder Mother         Anuerysm of Cerebral Artery, Dementia     Diabetes Mother      Thyroid Disease Mother         ,     Cerebrovascular Disease Mother      Dementia Mother      Osteoporosis Mother      Heart Disease Father         AAA     Hypertension Father      Circulatory Brother         Perihperal Neurophathy     Diabetes Maternal Grandmother      Asthma Maternal Grandmother      Chronic Obstructive Pulmonary Disease Maternal Grandfather         father     Asthma Maternal Grandfather      Diabetes Maternal Aunt         x2     Melanoma Maternal Aunt      Glaucoma Maternal Aunt      Breast Cancer Cousin      Dementia Other      Cancer Other         malignant melanoma     Hypertension Other      Hypertension Other      Cerebrovascular Disease Other      Cerebrovascular Disease Other      Obesity Other      Respiratory Other      Cancer Other      Diabetes Other      Asthma Other      Macular Degeneration No family hx of      Coronary Artery Disease No family hx of      Hyperlipidemia No family hx of       Kidney Disease No family hx of      Thrombosis No family hx of      Arthritis No family hx of      Depression No family hx of      Mental Illness No family hx of      Substance Abuse No family hx of      Cystic Fibrosis No family hx of      Early Death No family hx of      Coronary Artery Disease Early Onset No family hx of      Heart Failure No family hx of      Bleeding Diathesis No family hx of      Ovarian Cancer No family hx of      Uterine Cancer No family hx of      Prostate Cancer No family hx of      Colorectal Cancer No family hx of      Pancreatic Cancer No family hx of      Lung Cancer No family hx of      Other Cancer No family hx of      Autoimmune Disease No family hx of      Unknown/Adopted No family hx of      Genetic Disorder No family hx of      Bleeding Disorder No family hx of      Clotting Disorder No family hx of      Anesthesia Reaction No family hx of             Allergies:     Allergies   Allergen Reactions     Erythromycin Nausea     Penicillins Hives     Around age 6 - doesn't recall full reaction, mother told her it was hives.     Has tolerated cephalsporins.             Medications:     Current Outpatient Medications   Medication     acetaminophen (TYLENOL) 500 MG tablet     amLODIPine (NORVASC) 10 MG tablet     Ascorbic Acid (VITAMIN C) 500 MG CHEW     atorvastatin (LIPITOR) 80 MG tablet     bacitracin 500 UNIT/GM ophthalmic ointment     Cholecalciferol (VITAMIN D3) 50 MCG (2000 UT) CAPS     CRANBERRY EXTRACT PO     diclofenac (VOLTAREN) 1 % topical gel     erythromycin (ROMYCIN) 5 MG/GM ophthalmic ointment     gabapentin (NEURONTIN) 300 MG capsule     HYDROcodone-acetaminophen (NORCO) 5-325 MG tablet     hydrOXYzine (ATARAX) 10 MG tablet     hydrOXYzine (VISTARIL) 25 MG capsule     ibuprofen (ADVIL/MOTRIN) 600 MG tablet     levothyroxine (SYNTHROID/LEVOTHROID) 112 MCG tablet     lisinopril (ZESTRIL) 40 MG tablet     melatonin 5 MG tablet     methocarbamol (ROBAXIN) 500 MG tablet  "    metoprolol succinate ER (TOPROL-XL) 50 MG 24 hr tablet     Multiple Vitamin (MULTI-VITAMIN) per tablet     naloxone (NARCAN) 4 MG/0.1ML nasal spray     neomycin-polymixin-dexamethasone (MAXITROL) 0.1 % ophthalmic suspension     ondansetron (ZOFRAN-ODT) 4 MG ODT tab     senna-docusate (SENOKOT-S/PERICOLACE) 8.6-50 MG tablet     spironolactone (ALDACTONE) 25 MG tablet     triamcinolone (KENALOG) 0.1 % external ointment     Vaginal Lubricant (REPHRESH) GEL     Current Facility-Administered Medications   Medication     lidocaine 1 % injection 1 mL     lidocaine 1 % injection 1 mL     romosozumab-aqqg (EVENITY) injection 210 mg     triamcinolone (KENALOG-40) injection 40 mg     triamcinolone (KENALOG-40) injection 40 mg             Review of Systems:   A focused musculoskeletal and neurologic ROS was performed with pertinent positives and negatives noted in the HPI.  Additional systems were also reviewed and are documented at the bottom of the note.         Physical Exam:   Vitals: Ht 1.702 m (5' 7\")   Wt 73.9 kg (163 lb)   BMI 25.53 kg/m    Musculoskeletal, Neurologic, and Spine:            Lumbar Spine:    Appearance - No gross stepoffs or deformities    Motor -     L2-3: Hip flexion 5/5 R and 5/5 L strength          L3/4:  Knee extension R 5/5 and L 5/5 strength         L4/5:  Foot dorsiflexion R 4/5L 5/5 and       EHL dorsiflexion R cannot examine due to brace in place L 4/5 strength         S1:  Plantarflexion/Peroneal Muscles  R 5/5 and L 5/5 strength    Sensation: intact to light touch L3-S1 distribution BLE          Hip Exam:  No pain with hip log roll and no tenderness over the greater trochanters.    Alignment:  Patient stands with a neutral standing sagittal balance.           Imaging:   We ordered and independently reviewed new radiographs at this clinic visit. The results were discussed with the patient. Findings include:     CT shows no obvious new fractures       Assessment and Plan:     71 year old " female with back pain after traumatic fall.  CT negative.  Given that she is improving we will stay the course.  If she does not improve in 1-2 weeks we will order a cadaul GREGOR injection and she will send a my chart if this is needed.  [place] will follow up as scheduled for final CT visit in 6 months.             Respectfully,  Serge Ramirez MD  Spine Surgery  [facility]    Answers for HPI/ROS submitted by the patient on 5/23/2022  General Symptoms: No  Skin Symptoms: No  HENT Symptoms: Yes  EYE SYMPTOMS: Yes  HEART SYMPTOMS: Yes  LUNG SYMPTOMS: Yes  INTESTINAL SYMPTOMS: Yes  URINARY SYMPTOMS: Yes  GYNECOLOGIC SYMPTOMS: Yes  BREAST SYMPTOMS: No  SKELETAL SYMPTOMS: Yes  BLOOD SYMPTOMS: No  NERVOUS SYSTEM SYMPTOMS: Yes  MENTAL HEALTH SYMPTOMS: Yes  Ear pain: No  Ear discharge: No  Hearing loss: Yes  Tinnitus: No  Nosebleeds: No  Congestion: No  Sinus pain: No  Trouble swallowing: Yes   Voice hoarseness: No  Mouth sores: Yes  Sore throat: No  Tooth pain: No  Gum tenderness: No  Bleeding gums: No  Change in taste: No  Change in sense of smell: No  Dry mouth: Yes  Hearing aid used: Yes  Neck lump: No  Eye pain: No  Vision loss: Yes  Dry eyes: Yes  Watery eyes: No  Eye bulging: No  Double vision: No  Flashing of lights: No  Spots: Yes  Floaters: Yes  Redness: No  Crossed eyes: No  Tunnel Vision: No  Yellowing of eyes: No  Eye irritation: Yes  Chest pain or pressure: No  Fast or irregular heartbeat: Yes  Pain in legs with walking: Yes  Trouble breathing while lying down: No  Fingers or toes appear blue: No  High blood pressure: Yes  Low blood pressure: No  Fainting: No  Murmurs: Yes  Pacemaker: No  Varicose veins: No  Edema or swelling: Yes  Wake up at night with shortness of breath: No  Light-headedness: Yes  Exercise intolerance: Yes  Cough: No  Sputum or phlegm: No  Coughing up blood: No  Difficulty breating or shortness of breath: No  Snoring: Yes  Wheezing: No  Difficulty breathing on exertion:  No  Nighttime Cough: No  Difficulty breathing when lying flat: No  Heart burn or indigestion: No  Nausea: Yes  Vomiting: No  Abdominal pain: Yes  Bloating: No  Constipation: Yes  Diarrhea: No  Blood in stool: No  Black stools: No  Rectal or Anal pain: No  Fecal incontinence: No  Yellowing of skin or eyes: No  Vomit with blood: No  Change in stools: No  Trouble holding urine or incontinence: Yes  Pain or burning: No  Trouble starting or stopping: Yes  Increased frequency of urination: Yes  Blood in urine: No  Decreased frequency of urination: No  Frequent nighttime urination: Yes  Flank pain: Yes  Difficulty emptying bladder: Yes  Bleeding or spotting between periods: No  Heavy or painful periods: Yes  Irregular periods: No  Vaginal discharge: No  Hot flashes: No  Vaginal dryness: Yes  Genital ulcers: No  Reduced libido: Yes  Painful intercourse: Yes  Difficulty with sexual arousal: Yes  Post-menopausal bleeding: No  Back pain: Yes  Muscle aches: Yes  Neck pain: Yes  Swollen joints: Yes  Joint pain: Yes  Bone pain: Yes  Muscle cramps: Yes  Muscle weakness: Yes  Joint stiffness: Yes  Bone fracture: Yes  Trouble with coordination: Yes  Dizziness or trouble with balance: Yes  Fainting or black-out spells: No  Memory loss: No  Headache: No  Seizures: No  Speech problems: No  Tingling: Yes  Tremor: Yes  Weakness: Yes  Difficulty walking: Yes  Paralysis: No  Numbness: Yes  Nervous or Anxious: No  Depression: No  Trouble sleeping: Yes  Trouble thinking or concentrating: No  Mood changes: No  Panic attacks: No

## 2022-05-26 DIAGNOSIS — G47.20 SLEEP PATTERN DISTURBANCE: ICD-10-CM

## 2022-05-27 DIAGNOSIS — S62.635A CLOSED DISPLACED FRACTURE OF DISTAL PHALANX OF LEFT RING FINGER, INITIAL ENCOUNTER: Primary | ICD-10-CM

## 2022-05-31 ENCOUNTER — HOSPITAL ENCOUNTER (OUTPATIENT)
Facility: CLINIC | Age: 72
End: 2022-05-31
Payer: COMMERCIAL

## 2022-05-31 ENCOUNTER — OFFICE VISIT (OUTPATIENT)
Dept: ORTHOPEDICS | Facility: CLINIC | Age: 72
End: 2022-05-31
Payer: COMMERCIAL

## 2022-05-31 ENCOUNTER — ANCILLARY PROCEDURE (OUTPATIENT)
Dept: GENERAL RADIOLOGY | Facility: CLINIC | Age: 72
End: 2022-05-31
Attending: FAMILY MEDICINE
Payer: COMMERCIAL

## 2022-05-31 VITALS — WEIGHT: 163 LBS | HEIGHT: 67 IN | BODY MASS INDEX: 25.58 KG/M2

## 2022-05-31 DIAGNOSIS — M54.16 LUMBAR RADICULOPATHY: Primary | ICD-10-CM

## 2022-05-31 DIAGNOSIS — S62.524A CLOSED NONDISPLACED FRACTURE OF DISTAL PHALANX OF RIGHT THUMB, INITIAL ENCOUNTER: Primary | ICD-10-CM

## 2022-05-31 DIAGNOSIS — S62.524A CLOSED NONDISPLACED FRACTURE OF DISTAL PHALANX OF RIGHT THUMB, INITIAL ENCOUNTER: ICD-10-CM

## 2022-05-31 DIAGNOSIS — S62.635D CLOSED DISPLACED FRACTURE OF DISTAL PHALANX OF LEFT RING FINGER WITH ROUTINE HEALING, SUBSEQUENT ENCOUNTER: Primary | ICD-10-CM

## 2022-05-31 DIAGNOSIS — S62.524D CLOSED NONDISPLACED FRACTURE OF DISTAL PHALANX OF RIGHT THUMB WITH ROUTINE HEALING, SUBSEQUENT ENCOUNTER: ICD-10-CM

## 2022-05-31 PROCEDURE — 73130 X-RAY EXAM OF HAND: CPT | Mod: RT | Performed by: RADIOLOGY

## 2022-05-31 PROCEDURE — 99213 OFFICE O/P EST LOW 20 MIN: CPT | Performed by: FAMILY MEDICINE

## 2022-05-31 RX ORDER — HYDROXYZINE PAMOATE 25 MG/1
CAPSULE ORAL
Qty: 30 CAPSULE | Refills: 0 | Status: SHIPPED | OUTPATIENT
Start: 2022-05-31 | End: 2022-06-28

## 2022-05-31 NOTE — LETTER
5/31/2022    RE: Nadira Perez  [id number]  [place] [facility] [place] [address]     Dear Colleague,    Thank you for referring your patient, Nadira Perez, to the [facility] [place]. Please see a copy of my visit note below.    S: right thumb tuft fracture, distal left 4th phalanx fx x four weeks.    She feels that both fractures are improved.  She uses the Stax splints intermittently.  When she removes the fourth finger splint she has not noticed any mallet finger deformity of the distal digit.        DOI 5/4/22, right thumb tuft fracture, nondisplaced.  Right ankle distal fibular fracture nondisplaced, right foot distal fifth and fourth metatarsal fractures nondisplaced, non displaced distal left 4th phalanx fx      She avoids nonsteroidal anti-inflammatories.  She uses Tylenol for pain.       She has a walker to assist with walking.  She was given a post op shoe for weightbearing as tolerated with the help of the walker.  She was given a Stax splint to protect the thumb distal digit.          EXAM: XR FINGER LEFT G/E 2 VIEWS  5/10/2022 1:33 PM       HISTORY: Finger injury, left, initial encounter     COMPARISON: 5/4/2022     FINDINGS: AP and lateral views of the left fourth digit.     Nondisplaced fracture through the dorsal base of the fourth distal  phalanx which appears new since 5/4/2022. No erosion. Bones appear  osteopenic. Partially visualized 4 corner fusion an advanced first  carpometacarpal joint degenerative change. Mild soft tissue swelling  about the fourth proximal interphalangeal joint.                                                                      IMPRESSION: Nondisplaced fracture through the dorsal base of the  fourth distal phalanx which appears new since 5/4/2022.     ENEIDA CHOW MD (Joe)           3 views right foot radiographs 5/18/2022 1:07 PM     History: Right foot pain     Comparison: 5/4/2022     Findings:     Standing AP, oblique, and  lateral  views of the right foot were  obtained.      Redemonstration forth and fifth metatarsal neck fractures with minimal  interval healing. There is increased sclerosis at third metatarsal  neck consistent with additional area of non-displaced fracture.     Mild subchondral bone plate contour deformity of the fifth proximal  phalangeal head, similar to prior, favored to be representing sequelae  of remote trauma.     Lisfranc articulation alignment is congruent.     Moderate degenerative change second proximal interphalangeal joint.  Small os peroneus.     Bones appear osteopenic. Vascular calcification. Small plantar  calcaneal enthesophyte. Subcutaneous dystrophic calcification anterior  to the distal leg.                                                                      IMPRESSION:  1. Redemonstration forth and fifth metatarsal neck fractures with  minimal interval healing.   2. Increased sclerosis at third metatarsal neck consistent with  additional area of non-displaced fracture.     ERIK AUSTIN           3 views right hand radiographs 5/4/2022 2:40 PM     History: Ap/LAT/OBL; Fall      Comparison: Wrist radiographs 1/2/2020, hands 10/29/2020     Findings:     PA, oblique and lateral view(s) of the right hand were obtained.      Cortical step-off involving the dorsal aspect of the first distal  phalanx on the PA view.     Redemonstration of postsurgical changes of 4 corner fusion. The  majority of the previous noted screws have been removed. Single screw  fragment in the region of the capitate, similar to prior. Multiple  interphalangeal joint degenerative change.  Soft tissues unremarkable.                                                                      Impression:  Cortical step-off of the first distal phalanx on the PA view  suspicious for nondisplaced fracture.     ENEIDA CHOW MD (Joe)     Objective: Both splints are removed.  Both the thumb and the fourth finger she has full  extension strength against resistance with no extensor lag.  She also has full flexion strength at both fingertips with no loss of flexion strength.  She is mildly tender over the fracture site at the tuft of the thumb, she has mild tenderness over the dorsal fracture site at the fourth finger.  Overlying skin is intact.  Appropriate conversation and affect.    Assessment: Thumb tuft and fourth finger distal phalanx fractures, healing    We reviewed x-rays of both digits that show new bone formation and no displacement of the fractures.    P: She will wean herself from the stack splints over the next 2 weeks.  She understands we should need no further x-rays of these digits.  Fup right foot fracture fup 2 wks.  Xray right foot at that time.    Again, thank you for allowing me to participate in the care of your patient.      Sincerely,    Chaitanya Miguel MD

## 2022-05-31 NOTE — PROGRESS NOTES
S: right thumb tuft fracture, distal left 4th phalanx fx x four weeks.    She feels that both fractures are improved.  She uses the Stax splints intermittently.  When she removes the fourth finger splint she has not noticed any mallet finger deformity of the distal digit.        DOI 5/4/22, right thumb tuft fracture, nondisplaced.  Right ankle distal fibular fracture nondisplaced, right foot distal fifth and fourth metatarsal fractures nondisplaced, non displaced distal left 4th phalanx fx      She avoids nonsteroidal anti-inflammatories.  She uses Tylenol for pain.       She has a walker to assist with walking.  She was given a post op shoe for weightbearing as tolerated with the help of the walker.  She was given a Stax splint to protect the thumb distal digit.          EXAM: XR FINGER LEFT G/E 2 VIEWS  5/10/2022 1:33 PM       HISTORY: Finger injury, left, initial encounter     COMPARISON: 5/4/2022     FINDINGS: AP and lateral views of the left fourth digit.     Nondisplaced fracture through the dorsal base of the fourth distal  phalanx which appears new since 5/4/2022. No erosion. Bones appear  osteopenic. Partially visualized 4 corner fusion an advanced first  carpometacarpal joint degenerative change. Mild soft tissue swelling  about the fourth proximal interphalangeal joint.                                                                      IMPRESSION: Nondisplaced fracture through the dorsal base of the  fourth distal phalanx which appears new since 5/4/2022.     ENEIDA CHOW MD (Joe)           3 views right foot radiographs 5/18/2022 1:07 PM     History: Right foot pain     Comparison: 5/4/2022     Findings:     Standing AP, oblique, and lateral  views of the right foot were  obtained.      Redemonstration forth and fifth metatarsal neck fractures with minimal  interval healing. There is increased sclerosis at third metatarsal  neck consistent with additional area of non-displaced  fracture.     Mild subchondral bone plate contour deformity of the fifth proximal  phalangeal head, similar to prior, favored to be representing sequelae  of remote trauma.     Lisfranc articulation alignment is congruent.     Moderate degenerative change second proximal interphalangeal joint.  Small os peroneus.     Bones appear osteopenic. Vascular calcification. Small plantar  calcaneal enthesophyte. Subcutaneous dystrophic calcification anterior  to the distal leg.                                                                      IMPRESSION:  1. Redemonstration forth and fifth metatarsal neck fractures with  minimal interval healing.   2. Increased sclerosis at third metatarsal neck consistent with  additional area of non-displaced fracture.     ERIK AUSTIN           3 views right hand radiographs 5/4/2022 2:40 PM     History: Ap/LAT/OBL; Fall      Comparison: Wrist radiographs 1/2/2020, hands 10/29/2020     Findings:     PA, oblique and lateral view(s) of the right hand were obtained.      Cortical step-off involving the dorsal aspect of the first distal  phalanx on the PA view.     Redemonstration of postsurgical changes of 4 corner fusion. The  majority of the previous noted screws have been removed. Single screw  fragment in the region of the capitate, similar to prior. Multiple  interphalangeal joint degenerative change.  Soft tissues unremarkable.                                                                      Impression:  Cortical step-off of the first distal phalanx on the PA view  suspicious for nondisplaced fracture.     ENEIDA (Marycarmen CHOW MD     Objective: Both splints are removed.  Both the thumb and the fourth finger she has full extension strength against resistance with no extensor lag.  She also has full flexion strength at both fingertips with no loss of flexion strength.  She is mildly tender over the fracture site at the tuft of the thumb, she has mild tenderness over the  dorsal fracture site at the fourth finger.  Overlying skin is intact.  Appropriate conversation and affect.    Assessment: Thumb tuft and fourth finger distal phalanx fractures, healing    We reviewed x-rays of both digits that show new bone formation and no displacement of the fractures.    P: She will wean herself from the stack splints over the next 2 weeks.  She understands we should need no further x-rays of these digits.  Fup right foot fracture fup 2 wks.  Xray right foot at that time.

## 2022-06-06 ENCOUNTER — TELEPHONE (OUTPATIENT)
Dept: MEDSURG UNIT | Facility: CLINIC | Age: 72
End: 2022-06-06
Payer: COMMERCIAL

## 2022-06-09 RX ORDER — LIDOCAINE 40 MG/G
CREAM TOPICAL
Status: CANCELLED | OUTPATIENT
Start: 2022-06-09

## 2022-06-09 RX ORDER — DEXTROSE MONOHYDRATE 25 G/50ML
25-50 INJECTION, SOLUTION INTRAVENOUS
Status: DISCONTINUED | OUTPATIENT
Start: 2022-06-09 | End: 2022-06-11 | Stop reason: HOSPADM

## 2022-06-09 RX ORDER — NICOTINE POLACRILEX 4 MG
15-30 LOZENGE BUCCAL
Status: DISCONTINUED | OUTPATIENT
Start: 2022-06-09 | End: 2022-06-11 | Stop reason: HOSPADM

## 2022-06-10 ENCOUNTER — HOSPITAL ENCOUNTER (OUTPATIENT)
Facility: CLINIC | Age: 72
Discharge: HOME | End: 2022-06-10
Admitting: PHYSICIAN ASSISTANT
Payer: COMMERCIAL

## 2022-06-10 ENCOUNTER — HOSPITAL ENCOUNTER (OUTPATIENT)
Dept: GENERAL RADIOLOGY | Facility: CLINIC | Age: 72
Discharge: HOME | End: 2022-06-10
Attending: ORTHOPAEDIC SURGERY
Payer: COMMERCIAL

## 2022-06-10 VITALS
HEART RATE: 75 BPM | OXYGEN SATURATION: 98 % | RESPIRATION RATE: 18 BRPM | SYSTOLIC BLOOD PRESSURE: 148 MMHG | DIASTOLIC BLOOD PRESSURE: 89 MMHG

## 2022-06-10 VITALS — HEART RATE: 72 BPM | OXYGEN SATURATION: 97 % | DIASTOLIC BLOOD PRESSURE: 83 MMHG | SYSTOLIC BLOOD PRESSURE: 116 MMHG

## 2022-06-10 DIAGNOSIS — M54.16 LUMBAR RADICULOPATHY: ICD-10-CM

## 2022-06-10 PROCEDURE — 999N000154 HC STATISTIC RADIOLOGY XRAY, US, CT, MAR, NM

## 2022-06-10 PROCEDURE — 250N000011 HC RX IP 250 OP 636: Performed by: ORTHOPAEDIC SURGERY

## 2022-06-10 PROCEDURE — 250N000009 HC RX 250: Performed by: ORTHOPAEDIC SURGERY

## 2022-06-10 PROCEDURE — 62323 NJX INTERLAMINAR LMBR/SAC: CPT

## 2022-06-10 PROCEDURE — 255N000002 HC RX 255 OP 636: Performed by: ORTHOPAEDIC SURGERY

## 2022-06-10 RX ORDER — LIDOCAINE 40 MG/G
CREAM TOPICAL
Status: DISCONTINUED | OUTPATIENT
Start: 2022-06-10 | End: 2022-06-10 | Stop reason: HOSPADM

## 2022-06-10 RX ORDER — IOPAMIDOL 408 MG/ML
10 INJECTION, SOLUTION INTRATHECAL ONCE
Status: COMPLETED | OUTPATIENT
Start: 2022-06-10 | End: 2022-06-10

## 2022-06-10 RX ORDER — BETAMETHASONE SODIUM PHOSPHATE AND BETAMETHASONE ACETATE 3; 3 MG/ML; MG/ML
6 INJECTION, SUSPENSION INTRA-ARTICULAR; INTRALESIONAL; INTRAMUSCULAR; SOFT TISSUE ONCE
Status: COMPLETED | OUTPATIENT
Start: 2022-06-10 | End: 2022-06-10

## 2022-06-10 RX ADMIN — BETAMETHASONE ACETATE AND BETAMETHASONE SODIUM PHOSPHATE 3 ML: 3; 3 INJECTION, SUSPENSION INTRA-ARTICULAR; INTRALESIONAL; INTRAMUSCULAR; SOFT TISSUE at 14:56

## 2022-06-10 RX ADMIN — LIDOCAINE HYDROCHLORIDE 3 ML: 10 INJECTION, SOLUTION EPIDURAL; INFILTRATION; INTRACAUDAL; PERINEURAL at 14:55

## 2022-06-10 RX ADMIN — IOPAMIDOL 1 ML: 408 INJECTION, SOLUTION INTRATHECAL at 14:57

## 2022-06-10 NOTE — DISCHARGE INSTRUCTIONS
Steroid Injection Discharge Instructions     After you go home:    You may resume your normal diet.    Care of Puncture Site:    If you have a bandaid on your puncture site, you may remove it the next morning  You may shower tomorrow  No bath tubs, whirlpools or swimming pool for at least 48 hours  Use ice packs as needed for discomfort     Activity:    Minimize your activity today. You may gradually resume your normal activity as tolerated  Avoid vigorous or strenuous activity until your symptoms improve or as directed by your doctor  Do NOT drive a vehicle for a few hours after the injection - or longer if you develop numbness in your arm or leg    Medicines:    You may resume all medications, including blood thinners  For minor discomfort, you may take Acetaminophen (Tylenol) or Ibuprofen (Advil)    Pain:     You may experience increased or different pain over the next 24-48 hours  For the next 48 hrs - you may use ice packs for discomfort     Call your primary care doctor if:    You have severe pain that does not improve with pain medication  You have chills or a fever greater than 101 F (38 C)  The site is red, swollen, hot or tender  Increase in pain, weakness or numbness  New problems with your bowel or bladder  Any questions or concerns    What to watch for:    It can be normal to have some bruising or slight swelling at the puncture site.   After the procedure, you may have some new weakness or numbness down your arm/leg from the numbing medicine. This should resolve in a few hours.   You may feel some temporary relief from the numbing medicine, but that will wear off within a few hours.  Your symptoms may return to pre procedure level, or can even be worse for the first 1-2 days.  For many people, the steroid begins to provide some relief within 2-3 days, but it can take up to 2 weeks to obtain the full results.  Some people will get lasting relief from a single injection. Others may require up to 3  injections to get results. If you have more than one steroid injection, they should be given 2 weeks apart.  If you have no improvement in your symptoms after two weeks, please contact the doctor who ordered this procedure to discuss the next steps.  Side effects of your steroid injection are mild and will go away in 2-3 days  Insomnia  Irritability  Flushed face  Water retention  Restlessness  Difficulty sleeping  Increased appetite  Increased blood sugar  If you are diabetic, monitor your blood sugar closely. Contact the provider who manages your diabetes to help you control your blood sugar if needed.    If you have questions or concerns call:                  [facility]iology Dept @ [phone]                                    between 8am-4:30pm Mon-Fri    If you have urgent questions outside of these normal business hours, please contact the [facility] on call doctor @ [phone]

## 2022-06-10 NOTE — DISCHARGE SUMMARY
Care Suites Discharge Nursing Note    Patient Information  Name: Nadira Perez  Age: 71 year old    Discharge Education:  Discharge instructions reviewed: Yes  Additional education/resources provided: no  Patient/patient representative verbalizes understanding: Yes  Patient discharging on new medications: No  Medication education completed: N/A    Discharge Plans:   Discharge location: home  Discharge ride contacted: Yes  Approximate discharge time: 1500    Discharge Criteria:  Discharge criteria met and vital signs stable: Yes    Patient Belongs:  Patient belongings returned to patient: Yes    Live Villasenor RN

## 2022-06-10 NOTE — PROGRESS NOTES
Care Suites Post Procedure Note    Patient Information  Name: Nadira Perez  Age: 71 year old    Post Procedure  Time patient returned to Care Suites: 1510  Concerns/abnormal assessment: no immediate  If abnormal assessment, provider notified: N/A  Plan/Other:Continue post procedure plan of care.  Steroid injection site CDI, no hematoma.  VS stable, denies any new pain or discomfort.  Pt has some pain LE 4/10 (baseline).  Taking po fluid well.  Discharge instruction is given to pt.  All questions are answered.   Pt can be discharged after 5509    Nory Goldstein RN

## 2022-06-13 DIAGNOSIS — S92.901D CLOSED FRACTURE OF RIGHT FOOT WITH ROUTINE HEALING, SUBSEQUENT ENCOUNTER: ICD-10-CM

## 2022-06-13 DIAGNOSIS — S62.524A CLOSED NONDISPLACED FRACTURE OF DISTAL PHALANX OF RIGHT THUMB, INITIAL ENCOUNTER: Primary | ICD-10-CM

## 2022-06-14 ENCOUNTER — OFFICE VISIT (OUTPATIENT)
Dept: FAMILY MEDICINE | Facility: CLINIC | Age: 72
End: 2022-06-14

## 2022-06-14 VITALS
DIASTOLIC BLOOD PRESSURE: 84 MMHG | BODY MASS INDEX: 27.62 KG/M2 | SYSTOLIC BLOOD PRESSURE: 134 MMHG | TEMPERATURE: 99.6 F | WEIGHT: 176 LBS | HEIGHT: 67 IN | HEART RATE: 76 BPM

## 2022-06-14 DIAGNOSIS — Z01.818 PREOPERATIVE EXAMINATION: Primary | ICD-10-CM

## 2022-06-14 PROCEDURE — 99213 OFFICE O/P EST LOW 20 MIN: CPT | Performed by: NURSE PRACTITIONER

## 2022-06-14 ASSESSMENT — PATIENT HEALTH QUESTIONNAIRE - PHQ9
SUM OF ALL RESPONSES TO PHQ QUESTIONS 1-9: 0
5. POOR APPETITE OR OVEREATING: NOT AT ALL

## 2022-06-14 ASSESSMENT — ANXIETY QUESTIONNAIRES
2. NOT BEING ABLE TO STOP OR CONTROL WORRYING: NOT AT ALL
1. FEELING NERVOUS, ANXIOUS, OR ON EDGE: NOT AT ALL
GAD7 TOTAL SCORE: 0
6. BECOMING EASILY ANNOYED OR IRRITABLE: NOT AT ALL
IF YOU CHECKED OFF ANY PROBLEMS ON THIS QUESTIONNAIRE, HOW DIFFICULT HAVE THESE PROBLEMS MADE IT FOR YOU TO DO YOUR WORK, TAKE CARE OF THINGS AT HOME, OR GET ALONG WITH OTHER PEOPLE: NOT DIFFICULT AT ALL
5. BEING SO RESTLESS THAT IT IS HARD TO SIT STILL: NOT AT ALL
3. WORRYING TOO MUCH ABOUT DIFFERENT THINGS: NOT AT ALL
7. FEELING AFRAID AS IF SOMETHING AWFUL MIGHT HAPPEN: NOT AT ALL
GAD7 TOTAL SCORE: 0

## 2022-06-14 NOTE — ADDENDUM NOTE
Encounter addended by: Jennifer Jauregui, PT on: 6/14/2022 12:44 PM   Actions taken: Episode resolved

## 2022-06-14 NOTE — PATIENT INSTRUCTIONS
Preparing for Your Surgery      Name:  Nadira Perez  MRN:  [id number]  :  1950  Today's Date:  2022    Arriving for surgery:  Surgery date:  22  Surgery time:  7:15 AM  Arrival time:  6:15 AM    What can I eat or drink?  -  You may have solid food or milk products until 8 hours prior to your surgery.  -  You may have water, apple juice or 7up/Sprite until 2 hours prior to your surgery.    Which medicines should I take differently?   - spironolactone: HOLD on the day of surgery.   - ibuprofen (Advil, Motrin): HOLD 1 day before surgery.     - senna-docusate (senokot/Pericolace) HOLD on the day of surgery.    - topical gels, creams, etc: HOLD on the day of surgery   - multivitamins and herbal medications: HOLD 14 days before surgery.    How do I prepare myself?  -  Take two showers: on the night before surgery; and one the morning of surgery.         Use Hibiclens to wash.  -  Do NOT use lotion, powder, deodorant, or antiperspirant the day of your surgery.  -  Do NOT wear any makeup, fingernail polish or jewelry.  -  Bring your ID and insurance card.    Please come to:     [facility] [address] [address]     Parking is available in front of the [facility] building from 5:30AM to 8:00PM.  -  Proceed to the 5th floor to check into the Ambulatory Surgery Center.              >> There will be patient concierges on the 1st and 5th floor, for assistance or an escort, if you would like.              >> Please call [phone] with any questions.    Questions or Concerns:  If you have questions or concerns, please call the  Preoperative Assessment Center:  [phone]

## 2022-06-14 NOTE — PROGRESS NOTES
[facility] [address] [address]  Phone: [phone]  Fax: [phone]  Primary Provider: Matilde Quiñonez  Pre-op Performing Provider: JAKE KAUFFMAN    PREOPERATIVE EVALUATION:  Today's date: 6/14/2022    Naidra Perez is a 71 year old female who presents for a preoperative evaluation.    Surgical Information:  Surgery/Procedure: LEFT EYE VITRECTOMY, PARS PLANA APPROACH, USING 25-GAUGE INSTRUMENTS  [place] [facility] [place]geon: Felix Carlos MD  Surgery Date: 6/20/2022  Time of Surgery: 7:15 AM  Where patient plans to recover: At home with family  Fax number for surgical facility: Note does not need to be faxed, will be available electronically in Epic.    Type of Anesthesia Anticipated: MAC with Block    Assessment & Plan     The proposed surgical procedure is considered LOW risk.    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   - ibuprofen (Advil, Motrin): HOLD 1 day before surgery.     - senna-docusate (senokot/Pericolace) HOLD on the day of surgery.    - topical gels, creams, etc: HOLD on the day of surgery   - multivitamins and herbal medications: HOLD 14 days before  surgery.    RECOMMENDATION:  APPROVAL GIVEN to proceed with proposed procedure, without further diagnostic evaluation.    Subjective     HPI related to upcoming procedure: Ismael is a pleasant 71 year old patient who presents to the clinic today for preoperative examination. She is scheduled to undergo left eye vitrectomy for correction of posterior vitreous detachment. She endorses persistent floaters. Denies flashes or visual loss. Denies fevers, chills, malaise, CP, or SOB today. She is very eager for procedure and hopeful for post-surgical outcomes.     Preop Questions 6/14/2022   1. Have you ever had a heart attack or stroke? No   2. Have you ever had surgery on your heart or blood vessels, such as a stent placement, a coronary artery bypass, or surgery on an artery in your head, neck, heart, or legs? No   3. Do you have chest pain with activity? No   4. Do you have a history of  heart failure? No   5. Do you currently have a cold, bronchitis or symptoms of other infection? No   6. Do you have a cough, shortness of breath, or wheezing? No   7. Do you or anyone in your family have previous history of blood clots? No   8. Do you or does anyone in your family have a serious bleeding problem such as prolonged bleeding following surgeries or cuts? No   9. Have you ever had problems with anemia or been told to take iron pills? YES - mild anemia, not currently on treatment   10. Have you had any abnormal blood loss such as black, tarry or bloody stools, or abnormal vaginal bleeding? No   11. Have you ever had a blood transfusion? UNKNOWN    12. Are you willing to have a blood transfusion if it is medically needed before, during, or after your surgery? Yes   13. Have you or any of your relatives ever had problems with anesthesia? No   14. Do you have sleep apnea, excessive snoring or daytime drowsiness? No   15. Do you have any artifical heart valves or other implanted medical devices like a pacemaker, defibrillator, or  "continuous glucose monitor? No   16. Do you have artificial joints? No   17. Are you allergic to latex? No     Health Care Directive:  Patient does not have a Health Care Directive or Living Will: Patient reports that \"family knows her wishes.\"    Preoperative Review of :   reviewed - controlled substances reflected in medication list.    Status of Chronic Conditions:  HYPERLIPIDEMIA - Patient has a long history of hyperlipidemia requiring medication for treatment with recent fair control. Patient reports no problems or side effects with the medication.     HYPERTENSION - Patient has longstanding history of HTN , currently denies any symptoms referable to elevated blood pressure. Specifically denies chest pain, palpitations, dyspnea, orthopnea, PND or peripheral edema. Blood pressure readings have been in normal range. Current medication regimen is as listed below. Patient denies any side effects of medication.     HYPOTHYROIDISM - Patient has a longstanding history of chronic Hypothyroidism. Patient has been doing well, noting no tremor, insomnia, hair loss or changes in skin texture. Continues to take medications as directed, without adverse reactions or side effects. Last TSH   Lab Results   Component Value Date    TSH 1.54 01/12/2022   .    Review of Systems  Constitutional, neuro, ENT, endocrine, pulmonary, cardiac, gastrointestinal, genitourinary, musculoskeletal, integument and psychiatric systems are negative, except as otherwise noted.    Patient Active Problem List    Diagnosis Date Noted     PVD (posterior vitreous detachment), left eye 05/09/2022     Priority: Medium     Added automatically from request for surgery [id number]       Vitreous opacities of left eye 05/09/2022     Priority: Medium     Added automatically from request for surgery [id number]       S/P lumbar fusion 05/04/2022     Priority: Medium     Chronic bilateral low back pain 05/04/2022     Priority: Medium     Dermatochalasis of both " upper eyelids 04/11/2022     Priority: Medium     Added automatically from request for surgery [id number]       Brain lesion 12/16/2021     Priority: Medium     Spinal stenosis of lumbar region with neurogenic claudication 11/18/2021     Priority: Medium     Spondylolisthesis of lumbar region 11/18/2021     Priority: Medium     Peripheral neuropathy 10/27/2021     Priority: Medium     Degenerative scoliosis in adult patient 01/28/2020     Priority: Medium     DDD (degenerative disc disease), lumbar 10/22/2019     Priority: Medium     Chronic pain of right knee 12/27/2018     Priority: Medium     Osteopenia, unspecified location 11/21/2017     Priority: Medium     Aromatase inhibitor use 11/21/2017     Priority: Medium     Infiltrating ductal ca grade 2, ERpositive, PRpositive, HER2 negative by FISH 07/20/2016     Priority: Medium     Problem list name updated by automated process. 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 Provider to review       Dermatitis 11/03/2013     Priority: Medium     Hypovitaminosis D 07/16/2013     Priority: Medium     Contact dermatitis and other eczema, due to unspecified cause 07/16/2013     Priority: Medium     Mechanical problems with limbs 03/25/2013     Priority: Medium     Osteoporosis 03/01/2013     Priority: Medium     Essential hypertension, benign 02/03/2013     Priority: Medium     Inflamed seborrheic keratosis 01/29/2013     Priority: Medium     Personal history of other malignant neoplasm of skin 12/23/2011     Priority: Medium     Hypopotassemia 11/08/2011     Priority: Medium     Hyperlipidemia 11/08/2011     Priority: Medium     Problem list name updated by automated process. Provider to review       Murmurs 11/08/2011     Priority: Medium     Nephrolithiasis  11/08/2011     Priority: Medium     Osteoarthrosis, hand 11/08/2011     Priority: Medium     Problem list name updated by automated process. 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     Thyroid disease      Past Surgical History:   Procedure Laterality Date     ABDOMEN SURGERY      ovarian cyst, mesh     ARTHRODESIS WRIST Right      ARTHRODESIS WRIST  02/14/2013    Procedure: ARTHRODESIS WRIST;  left wrist scaphoid excision, four bone fusion, iliac crest bone graft  ( Mac with block);  Surgeon: Av Mendez MD;  Location:  OR     BIOPSY      skin, vaginal     BLEPHAROPLASTY BILATERAL Bilateral 5/6/2022    Procedure: UPPER BLEPHAROPLASTY, BILATERAL;  Surgeon: Jemal Sanchez MD;  Location: [facility] [place]     CATARACT IOL, RT/LT Right 03/13/2018     CATARACT IOL, RT/LT Left 02/20/2018     COLONOSCOPY  12/24/2013    Procedure: COMBINED COLONOSCOPY, SINGLE BIOPSY/POLYPECTOMY BY BIOPSY;  COLONOSCOPY;  Surgeon: Dom Alvarez MD;  Location:  GI     COSMETIC BLEPHAROPLASTY LOWER LIDS BILATERAL Bilateral 5/6/2022    Procedure: BLEPHAROPLASTY, LOWER EYELID, BILATERAL, COSMETIC;  Surgeon: Jemal Sanchez MD;  Location: [facility] [place]     ESOPHAGOSCOPY, GASTROSCOPY, DUODENOSCOPY (EGD), COMBINED N/A 11/23/2016    Procedure: COMBINED ESOPHAGOSCOPY, GASTROSCOPY, DUODENOSCOPY (EGD);  Surgeon: Quinten Feliciano MD;  Location:  GI     EXTERNAL EAR SURGERY      right     EYE SURGERY      radial keratomy     FUSION, SPINE, INTERBODY, OBLIQUE ANTERIOR AND LUMBAR, 2 LEVELS, POST APPROACH,  USING OTS N/A 11/18/2021    Procedure: Part 1: Oblique Anterior Interbody Fusion at Lumbar 5 to sacral 1 with use of Bone Morphogenic Protein,;  Surgeon: Serge Ramirez MD;  Location: [facility] OR     GRAFT BONE FROM ILIAC CREST  02/14/2013    Procedure: GRAFT BONE FROM ILIAC CREST;  mac with block and local infilitration;  Surgeon: Av Mendez MD;  [facility]: [facility]     HC BREATH HYDROGEN TEST N/A 10/14/2016    Procedure: HYDROGEN BREATH TEST;  Surgeon: Cheri Barron MD;  Location: [facility] GI     HERNIA REPAIR      umbilical age 18 mos.     HYSTERECTOMY TOTAL ABDOMINAL  05/03/2000     MASTECTOMY MODIFIED RADICAL Bilateral     bilateral; right breast prophylactic     OPTICAL TRACKING SYSTEM FUSION POSTERIOR SPINE LUMBAR N/A 11/18/2021    Procedure: Open Posterior Instrumented Spinal Fusion at Lumbar 5 to Sacral 1, with grimm aguayo osteotomy, use of O-Arm/Stealth;  Surgeon: Serge Ramirez MD;  Location: [facility] OR     PARATHYROIDECTOMY  09/23/2004    R SUPERIOR     PARATHYROIDECTOMY  09/23/2004    parathyroid resection, subtotal     RELEASE CARPAL TUNNEL Right 12/2/2021    Procedure: RIGHT CARPAL TUNNEL RELEASE, RIGHT WRIST HARDWARE REMOVAL, RIGHT CARPAL BOSS EXCISION;  Surgeon: Rolan Conley MD;  Location: [facility] OR     REMOVE HARDWARE HAND  09/24/2013    Procedure: REMOVE HARDWARE HAND;  Left Hand Screw Removal        RHINOPLASTY  1968     thyr proc skin closed cosmetic manner by subcuticular stitch  01/23/2009     THYROPLASTY  10/09/2009     TONSILLECTOMY  1977     WRIST SURGERY      wrist arthrodesis     Current Outpatient Medications   Medication Sig Dispense Refill     acetaminophen (TYLENOL) 500 MG tablet Take 500-1,000 mg by mouth every 6 hours as needed for mild pain       amLODIPine (NORVASC) 10 MG tablet Take 1 tablet (10 mg) by mouth daily 90 tablet 3     Ascorbic Acid (VITAMIN C) 500 MG CHEW Take 1 tablet by mouth daily       atorvastatin (LIPITOR) 80 MG tablet Take 1  tablet (80 mg) by mouth daily 90 tablet 3     bacitracin 500 UNIT/GM ophthalmic ointment Apply small amount to incision sites three times daily and apply to inner lower lid of operative eye(s) at bedtime, as directed per physician instructions. 3.5 g 0     Cholecalciferol (VITAMIN D3) 50 MCG (2000 UT) CAPS Take 6,000 Units by mouth daily 270 capsule 3     CRANBERRY EXTRACT PO Take 650 mg by mouth daily ~10 am  Takes 1       diclofenac (VOLTAREN) 1 % topical gel APPLY 4 GRAMS TO KNEES OR 2 GRAMS TO HANDS FOUR TIMES DAILY USING ENCLOSED DOSING CARD. 100 g 3     erythromycin (ROMYCIN) 5 MG/GM ophthalmic ointment Apply small amount to incision sites three times daily, then apply to inner lower lid of operative eye(s) at bedtime, as directed. 3.5 g 0     gabapentin (NEURONTIN) 300 MG capsule Take 3 capsules (900 mg) by mouth 3 times daily 270 capsule 11     HYDROcodone-acetaminophen (NORCO) 5-325 MG tablet Take 1 tablet by mouth every 6 hours as needed for severe pain Uses about 10 tablets per month. 30 tablet 0     hydrOXYzine (ATARAX) 10 MG tablet Take 1 tablet (10 mg) by mouth 2 times daily as needed for itching (pain adjuvant) 30 tablet 0     hydrOXYzine (VISTARIL) 25 MG capsule TAKE 1 CAPSULE BY MOUTH DAILY AT BEDTIME. 30 capsule 0     ibuprofen (ADVIL/MOTRIN) 600 MG tablet Take 1 tablet (600 mg) by mouth every 6 hours as needed for other (mild and/or inflammatory pain) 30 tablet 0     levothyroxine (SYNTHROID/LEVOTHROID) 112 MCG tablet TAKE 1/2 TABLET BY MOUTH DAILY 45 tablet 3     lisinopril (ZESTRIL) 40 MG tablet Take 1 tablet (40 mg) by mouth daily 90 tablet 3     melatonin 5 MG tablet Take 10 mg by mouth At Bedtime        methocarbamol (ROBAXIN) 500 MG tablet Take 1 tablet (500 mg) by mouth 3 times daily 40 tablet 0     metoprolol succinate ER (TOPROL-XL) 50 MG 24 hr tablet Take 1 tablet (50 mg) by mouth daily 90 tablet 3     Multiple Vitamin (MULTI-VITAMIN) per tablet Take 1 tablet by mouth daily        naloxone (NARCAN) 4 MG/0.1ML nasal spray Spray 1 spray (4 mg) into one nostril alternating nostrils once as needed for opioid reversal every 2-3 minutes until assistance arrives 0.2 mL 0     neomycin-polymixin-dexamethasone (MAXITROL) 0.1 % ophthalmic suspension Place 1 drop into both eyes 4 times daily 3 mL 0     ondansetron (ZOFRAN-ODT) 4 MG ODT tab Take 1 tablet (4 mg) by mouth every 12 hours as needed for nausea 180 tablet 3     senna-docusate (SENOKOT-S/PERICOLACE) 8.6-50 MG tablet Take 2 tablets by mouth 2 times daily 30 tablet 0     spironolactone (ALDACTONE) 25 MG tablet Take 2 tablets (50 mg) by mouth daily 90 tablet 3     triamcinolone (KENALOG) 0.1 % external ointment Apply topically 2 times daily 30 g 11     Vaginal Lubricant (REPHRESH) GEL Place 2 g vaginally every 3 days 2 g 3       Allergies   Allergen Reactions     Erythromycin Nausea     Penicillins Hives     Around age 6 - doesn't recall full reaction, mother told her it was hives.     Has tolerated cephalsporins.         Social History     Tobacco Use     Smoking status: Never Smoker     Smokeless tobacco: Never Used   Substance Use Topics     Alcohol use: Not Currently     Alcohol/week: 7.0 standard drinks     Types: 7 Glasses of wine per week     Comment: Rare to occasional     Family History   Problem Relation Age of Onset     Neurologic Disorder Mother         Anuerysm of Cerebral Artery, Dementia     Diabetes Mother      Thyroid Disease Mother         ,     Cerebrovascular Disease Mother      Dementia Mother      Osteoporosis Mother      Heart Disease Father         AAA     Hypertension Father      Circulatory Brother         Perihperal Neurophathy     Diabetes Maternal Grandmother      Asthma Maternal Grandmother      Chronic Obstructive Pulmonary Disease Maternal Grandfather         father     Asthma Maternal Grandfather      Diabetes Maternal Aunt         x2     Melanoma Maternal Aunt      Glaucoma Maternal Aunt      Breast Cancer Cousin   "    Dementia Other      Cancer Other         malignant melanoma     Hypertension Other      Hypertension Other      Cerebrovascular Disease Other      Cerebrovascular Disease Other      Obesity Other      Respiratory Other      Cancer Other      Diabetes Other      Asthma Other      Macular Degeneration No family hx of      Coronary Artery Disease No family hx of      Hyperlipidemia No family hx of      Kidney Disease No family hx of      Thrombosis No family hx of      Arthritis No family hx of      Depression No family hx of      Mental Illness No family hx of      Substance Abuse No family hx of      Cystic Fibrosis No family hx of      Early Death No family hx of      Coronary Artery Disease Early Onset No family hx of      Heart Failure No family hx of      Bleeding Diathesis No family hx of      Ovarian Cancer No family hx of      Uterine Cancer No family hx of      Prostate Cancer No family hx of      Colorectal Cancer No family hx of      Pancreatic Cancer No family hx of      Lung Cancer No family hx of      Other Cancer No family hx of      Autoimmune Disease No family hx of      Unknown/Adopted No family hx of      Genetic Disorder No family hx of      Bleeding Disorder No family hx of      Clotting Disorder No family hx of      Anesthesia Reaction No family hx of      History   Drug Use     Types: Marijuana         Objective     /84 (BP Location: Right arm, Patient Position: Sitting, Cuff Size: Adult Regular)   Pulse 76   Temp 99.6  F (37.6  C) (Oral)   Ht 1.702 m (5' 7\")   Wt 79.8 kg (176 lb)   BMI 27.57 kg/m      Physical Exam    GENERAL APPEARANCE: healthy, alert and no distress     EYES: EOMI, PERRL     HENT: ear canals and TM's normal and nose and mouth without ulcers or lesions     NECK: no adenopathy, no asymmetry, masses, or scars and thyroid normal to palpation     RESP: lungs clear to auscultation - no rales, rhonchi or wheezes     CV: regular rates and rhythm, normal S1 S2, no S3 or " S4 and no murmur, click or rub     ABDOMEN:  soft, nontender, no HSM or masses and bowel sounds normal; ventral hernia present     MS: extremities normal- no gross deformities noted, no evidence of inflammation in joints, FROM in all extremities.     SKIN: no suspicious lesions or rashes; lumbar fusion site well-approximated and no localized edema, erythema, or drainage     NEURO: Normal strength and tone, sensory exam grossly normal, mentation intact and speech normal     PSYCH: mentation appears normal. and affect normal/bright     LYMPHATICS: No cervical adenopathy    Recent Labs   Lab Test 04/15/22  1632 01/12/22  1034 12/08/21  1550 11/19/21  0514 11/18/21  1859 11/05/21  1324   HGB  --  11.8  --  9.4*   < > 11.5*   PLT  --  359  --  245  --  255   NA  --  144 143 140   < > 142   POTASSIUM 3.9 4.0 3.4 3.6   < > 3.4   CR  --  0.63 0.60 0.78   < > 0.70   A1C 5.9*  --   --   --   --  5.9*    < > = values in this interval not displayed.      Diagnostics:  No labs were ordered during this visit.   No EKG required for low risk surgery (cataract, skin procedure, breast biopsy, etc).    Revised Cardiac Risk Index (RCRI):  The patient has the following serious cardiovascular risks for perioperative complications:   - No serious cardiac risks = 0 points     RCRI Interpretation: 0 points: Class I (very low risk - 0.4% complication rate)      Signed Electronically by: MERCEDES Beach CNP  Copy of this evaluation report is provided to requesting physician.

## 2022-06-15 ENCOUNTER — OFFICE VISIT (OUTPATIENT)
Dept: ORTHOPEDICS | Facility: CLINIC | Age: 72
End: 2022-06-15
Payer: COMMERCIAL

## 2022-06-15 VITALS — HEIGHT: 67 IN | WEIGHT: 176 LBS | BODY MASS INDEX: 27.62 KG/M2

## 2022-06-15 DIAGNOSIS — S92.901D CLOSED FRACTURE OF RIGHT FOOT WITH ROUTINE HEALING, SUBSEQUENT ENCOUNTER: Primary | ICD-10-CM

## 2022-06-15 DIAGNOSIS — M80.08XG PATHOLOGICAL FRACTURE OF VERTEBRA DUE TO AGE-RELATED OSTEOPOROSIS WITH DELAYED HEALING, SUBSEQUENT ENCOUNTER: Primary | ICD-10-CM

## 2022-06-15 DIAGNOSIS — M81.0 OSTEOPOROSIS, UNSPECIFIED OSTEOPOROSIS TYPE, UNSPECIFIED PATHOLOGICAL FRACTURE PRESENCE: ICD-10-CM

## 2022-06-15 PROCEDURE — 99213 OFFICE O/P EST LOW 20 MIN: CPT | Mod: 25 | Performed by: FAMILY MEDICINE

## 2022-06-15 PROCEDURE — 99207 PR DROP WITH A PROCEDURE: CPT | Performed by: FAMILY MEDICINE

## 2022-06-15 PROCEDURE — 96372 THER/PROPH/DIAG INJ SC/IM: CPT | Mod: 50 | Performed by: FAMILY MEDICINE

## 2022-06-15 NOTE — NURSING NOTE
[address] [address]  Dept: [phone]  ______________________________________________________________________________    Patient: Nadira Perez   : 1950   MRN: [id number]   Honey 15, 2022    INVASIVE PROCEDURE SAFETY CHECKLIST    Date: 6/15/22   Procedure:Bilateral subcutaneous Evenity injections   Patient Name: Nadira Perez  MRN: [id number]  YOB: 1950    Action: Complete sections as appropriate. Any discrepancy results in a HARD COPY until resolved.     PRE PROCEDURE:  Patient ID verified with 2 identifiers (name and  or MRN): Yes  Procedure and site verified with patient/designee (when able): Yes  Accurate consent documentation in medical record: Yes  H&P (or appropriate assessment) documented in medical record: Yes  H&P must be up to 20 days prior to procedure and updates within 24 hours of procedure as applicable: NA  Relevant diagnostic and radiology test results appropriately labeled and displayed as applicable: Yes  Procedure site(s) marked with provider initials: NA    TIMEOUT:  Time-Out performed immediately prior to starting procedure, including verbal and active participation of all team members addressing the following:Yes  * Correct patient identify  * Confirmed that the correct side and site are marked  * An accurate procedure consent form  * Agreement on the procedure to be done  * Correct patient position  * Relevant images and results are properly labeled and appropriately displayed  * The need to administer antibiotics or fluids for irrigation purposes during the procedure as applicable   * Safety precautions based on patient history or medication use    DURING PROCEDURE: Verification of correct person, site, and procedures any time the responsibility for care of the patient is transferred to another member of the care team.       Prior to injection, verified patient identity using patient's  name and date of birth.  Due to injection administration, patient instructed to remain in clinic for 15 minutes  afterwards, and to report any adverse reaction to me immediately.    Evenity injections 2/12    Drug Amount Wasted:  None.  Vial/Syringe: Syringe  Expiration Date:  07/01/2024      Gia Dewitt, ATC  Honey 15, 2022

## 2022-06-15 NOTE — PROGRESS NOTES
"  Assessment & Plan     Pathological fracture of vertebra due to age-related osteoporosis with delayed healing, subsequent encounter  Evenity #2  - romosozumab-aqqg (EVENITY) injection 210 mg    Prescription drug management         See Patient Instructions    Return in about 4 weeks (around 7/13/2022), or if symptoms worsen or fail to improve.    Cortney Clark MD  [facility] SPORTS MEDICINE [facility]    Tom Guevara is a 71 year old who presents for the following health issues  accompanied by her self.    RODNEY     Is a 71-year-old female with past medical history of osteoporosis here for her second Evenity injection.  Patient tolerating the injections and requesting thigh injections today.    Review of Systems   Constitutional, HEENT, cardiovascular, pulmonary, gi and gu systems are negative, except as otherwise noted.      Objective    Ht 1.702 m (5' 7\")   Wt 79.8 kg (176 lb)   BMI 27.57 kg/m    Body mass index is 27.57 kg/m .  Physical Exam   NAD  nonlabored breathing  No rash on legs              Procedure: Some benefits discussed and patient was consented.  Alcohol used to clean the area of the skin and ethyl chloride to anesthetize the area.  1 injected each lateral thigh.  Band aids in place.  "

## 2022-06-15 NOTE — LETTER
6/15/2022      RE: Nadira Perez  [id number]  [facility] [address]     Dear Colleague,    Thank you for referring your patient, Nadira Perez, to the [facility] [place]. Please see a copy of my visit note below.    Follow-up right foot fracture x 6 weeks.  She feels that the foot is improved.  She does not notice discomfort with ambulation.  She is using the postop shoe.    DOI 5/4/22,  Right ankle distal fibular fracture nondisplaced, right foot distal fifth and fourth metatarsal fractures, nondisplaced.  Also h/o nondisplaced distal left 4th phalanx fx, and right thumb tuft fracture, nondisplaced.           PMH:  Past Medical History:   Diagnosis Date     Arthritis      Bone disease      Breast cancer (H)      H/O kyphoplasty      Hearing problem      History of kidney stones      History of radiation therapy      Hyperlipemia      Hypertension      Hypopotassemia      Kidney problem      Lymph edema      Medullary sponge kidney      Osteopenia      PONV (postoperative nausea and vomiting)      Reduced vision      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     Thyroid disease        Active problem list:  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     Corneal opacity     Hypothyroidism     Osteopenia, unspecified location     Aromatase inhibitor use     Chronic pain of right knee     DDD (degenerative disc disease), lumbar     Degenerative scoliosis in  adult patient     Peripheral neuropathy     Spinal stenosis of lumbar region with neurogenic claudication     Spondylolisthesis of lumbar region     Brain lesion     Dermatochalasis of both upper eyelids     S/P lumbar fusion     Chronic bilateral low back pain     PVD (posterior vitreous detachment), left eye     Vitreous opacities of left eye       FH:  Family History   Problem Relation Age of Onset     Neurologic Disorder Mother         Anuerysm of Cerebral Artery, Dementia     Diabetes Mother      Thyroid Disease Mother         ,     Cerebrovascular Disease Mother      Dementia Mother      Osteoporosis Mother      Heart Disease Father         AAA     Hypertension Father      Circulatory Brother         Perihperal Neurophathy     Diabetes Maternal Grandmother      Asthma Maternal Grandmother      Chronic Obstructive Pulmonary Disease Maternal Grandfather         father     Asthma Maternal Grandfather      Diabetes Maternal Aunt         x2     Melanoma Maternal Aunt      Glaucoma Maternal Aunt      Breast Cancer Cousin      Dementia Other      Cancer Other         malignant melanoma     Hypertension Other      Hypertension Other      Cerebrovascular Disease Other      Cerebrovascular Disease Other      Obesity Other      Respiratory Other      Cancer Other      Diabetes Other      Asthma Other      Macular Degeneration No family hx of      Coronary Artery Disease No family hx of      Hyperlipidemia No family hx of      Kidney Disease No family hx of      Thrombosis No family hx of      Arthritis No family hx of      Depression No family hx of      Mental Illness No family hx of      Substance Abuse No family hx of      Cystic Fibrosis No family hx of      Early Death No family hx of      Coronary Artery Disease Early Onset No family hx of      Heart Failure No family hx of      Bleeding Diathesis No family hx of      Ovarian Cancer No family hx of      Uterine Cancer No family hx of      Prostate Cancer No family  hx of      Colorectal Cancer No family hx of      Pancreatic Cancer No family hx of      Lung Cancer No family hx of      Other Cancer No family hx of      Autoimmune Disease No family hx of      Unknown/Adopted No family hx of      Genetic Disorder No family hx of      Bleeding Disorder No family hx of      Clotting Disorder No family hx of      Anesthesia Reaction No family hx of        SH:  Social History     Socioeconomic History     Marital status:      Spouse name: Not on file     Number of children: Not on file     Years of education: Not on file     Highest education level: Not on file   Occupational History     Not on file   Tobacco Use     Smoking status: Never Smoker     Smokeless tobacco: Never Used   Substance and Sexual Activity     Alcohol use: Not Currently     Alcohol/week: 7.0 standard drinks     Types: 7 Glasses of wine per week     Comment: Rare to occasional     Drug use: Yes     Types: Marijuana     Sexual activity: Not Currently     Partners: Male     Birth control/protection: Abstinence   Other Topics Concern      Service No     Blood Transfusions Not Asked     Caffeine Concern No     Occupational Exposure No     Hobby Hazards No     Sleep Concern No     Stress Concern No     Weight Concern No     Special Diet No     Back Care No     Exercise Yes     Comment: walks 4-6x  week for 20-30 min. each     Bike Helmet Not Asked     Seat Belt Yes     Self-Exams Yes     Parent/sibling w/ CABG, MI or angioplasty before 65F 55M? No   Social History Narrative    .  Recently retired. 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        She lives with a renter.         She exercises 50 minutes three times a week.     Social Determinants of Health     Financial Resource Strain: Not on file   Food Insecurity: Not on file   Transportation Needs: Not on file   Physical Activity: Not on file   Stress: Not on file    Social Connections: Not on file   Intimate Partner Violence: Not on file   Housing Stability: Not on file       MEDS:  See EMR, reviewed  ALL:  See EMR, reviewed    REVIEW OF SYSTEMS:  CONSTITUTIONAL:NEGATIVE for fever, chills, change in weight  INTEGUMENTARY/SKIN: NEGATIVE for worrisome rashes, moles or lesions  EYES: NEGATIVE for vision changes or irritation  ENT/MOUTH: NEGATIVE for ear, mouth and throat problems  RESP:NEGATIVE for significant cough or SOB  BREAST: NEGATIVE for masses, tenderness or discharge  CV: NEGATIVE for chest pain, palpitations or peripheral edema  GI: NEGATIVE for nausea, abdominal pain, heartburn, or change in bowel habits  :NEGATIVE for frequency, dysuria, or hematuria  :NEGATIVE for frequency, dysuria, or hematuria  NEURO: NEGATIVE for weakness, dizziness or paresthesias  ENDOCRINE: NEGATIVE for temperature intolerance, skin/hair changes  HEME/ALLERGY/IMMUNE: NEGATIVE for bleeding problems  PSYCHIATRIC: NEGATIVE for changes in mood or affect      Objective: There is minimal tenderness over the right distal fifth and fourth metatarsals.  She can flex and extend the toes.  Sensation is intact.  Appropriate conversation and affect.    We reviewed x-rays that show prominent new bone formation at the right distal fifth and fourth metatarsal heads.    Assessment right-sided foot fracture, healing    Plan: She plans on treating the next 2 weeks as a weaning process from the postop shoe.  She will try weaning to a regular shoe as tolerated.  She will look for continued improvements over the next 2 months.  She will follow-up as needed.    Again, thank you for allowing me to participate in the care of your patient.      Sincerely,    Chaitanya Miguel MD

## 2022-06-15 NOTE — LETTER
"  6/15/2022      RE: Nadira Perez  [address]  [facility] [address]     Dear Colleague,    Thank you for referring your patient, Nadira Perez, to the [facility]. Please see a copy of my visit note below.      Assessment & Plan     Pathological fracture of vertebra due to age-related osteoporosis with delayed healing, subsequent encounter  Evenity #2  - romosozumab-aqqg (EVENITY) injection 210 mg    Prescription drug management         See Patient Instructions    Return in about 4 weeks (around 7/13/2022), or if symptoms worsen or fail to improve.    Cortney Clark MD  [facility]    Tom Guevara is a 71 year old who presents for the following health issues  accompanied by her self.    HPI     Is a 71-year-old female with past medical history of osteoporosis here for her second Evenity injection.  Patient tolerating the injections and requesting thigh injections today.    Review of Systems   Constitutional, HEENT, cardiovascular, pulmonary, gi and gu systems are negative, except as otherwise noted.      Objective    Ht 1.702 m (5' 7\")   Wt 79.8 kg (176 lb)   BMI 27.57 kg/m    Body mass index is 27.57 kg/m .  Physical Exam   NAD  nonlabored breathing  No rash on legs      Procedure: Some benefits discussed and patient was consented.  Alcohol used to clean the area of the skin and ethyl chloride to anesthetize the area.  1 injected each lateral thigh.  Band aids in place.      Again, thank you for allowing me to participate in the care of your patient.      Sincerely,    Cortney Clark MD    "

## 2022-06-15 NOTE — PROGRESS NOTES
Follow-up right foot fracture x 6 weeks.  She feels that the foot is improved.  She does not notice discomfort with ambulation.  She is using the postop shoe.    DOI 5/4/22,  Right ankle distal fibular fracture nondisplaced, right foot distal fifth and fourth metatarsal fractures, nondisplaced.  Also h/o nondisplaced distal left 4th phalanx fx, and right thumb tuft fracture, nondisplaced.           PMH:  Past Medical History:   Diagnosis Date     Arthritis      Bone disease      Breast cancer (H)      H/O kyphoplasty      Hearing problem      History of kidney stones      History of radiation therapy      Hyperlipemia      Hypertension      Hypopotassemia      Kidney problem      Lymph edema      Medullary sponge kidney      Osteopenia      PONV (postoperative nausea and vomiting)      Reduced vision      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     Thyroid disease        Active problem list:  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     Corneal opacity     Hypothyroidism     Osteopenia, unspecified location     Aromatase inhibitor use     Chronic pain of right knee     DDD (degenerative disc disease), lumbar     Degenerative scoliosis in adult patient     Peripheral neuropathy     Spinal stenosis of lumbar region with neurogenic claudication     Spondylolisthesis of lumbar region     Brain lesion     Dermatochalasis of both upper eyelids     S/P lumbar fusion     Chronic bilateral low back pain     PVD  (posterior vitreous detachment), left eye     Vitreous opacities of left eye       FH:  Family History   Problem Relation Age of Onset     Neurologic Disorder Mother         Anuerysm of Cerebral Artery, Dementia     Diabetes Mother      Thyroid Disease Mother         ,     Cerebrovascular Disease Mother      Dementia Mother      Osteoporosis Mother      Heart Disease Father         AAA     Hypertension Father      Circulatory Brother         Perihperal Neurophathy     Diabetes Maternal Grandmother      Asthma Maternal Grandmother      Chronic Obstructive Pulmonary Disease Maternal Grandfather         father     Asthma Maternal Grandfather      Diabetes Maternal Aunt         x2     Melanoma Maternal Aunt      Glaucoma Maternal Aunt      Breast Cancer Cousin      Dementia Other      Cancer Other         malignant melanoma     Hypertension Other      Hypertension Other      Cerebrovascular Disease Other      Cerebrovascular Disease Other      Obesity Other      Respiratory Other      Cancer Other      Diabetes Other      Asthma Other      Macular Degeneration No family hx of      Coronary Artery Disease No family hx of      Hyperlipidemia No family hx of      Kidney Disease No family hx of      Thrombosis No family hx of      Arthritis No family hx of      Depression No family hx of      Mental Illness No family hx of      Substance Abuse No family hx of      Cystic Fibrosis No family hx of      Early Death No family hx of      Coronary Artery Disease Early Onset No family hx of      Heart Failure No family hx of      Bleeding Diathesis No family hx of      Ovarian Cancer No family hx of      Uterine Cancer No family hx of      Prostate Cancer No family hx of      Colorectal Cancer No family hx of      Pancreatic Cancer No family hx of      Lung Cancer No family hx of      Other Cancer No family hx of      Autoimmune Disease No family hx of      Unknown/Adopted No family hx of      Genetic Disorder No family hx of       Bleeding Disorder No family hx of      Clotting Disorder No family hx of      Anesthesia Reaction No family hx of        SH:  Social History     Socioeconomic History     Marital status:      Spouse name: Not on file     Number of children: Not on file     Years of education: Not on file     Highest education level: Not on file   Occupational History     Not on file   Tobacco Use     Smoking status: Never Smoker     Smokeless tobacco: Never Used   Substance and Sexual Activity     Alcohol use: Not Currently     Alcohol/week: 7.0 standard drinks     Types: 7 Glasses of wine per week     Comment: Rare to occasional     Drug use: Yes     Types: Marijuana     Sexual activity: Not Currently     Partners: Male     Birth control/protection: Abstinence   Other Topics Concern      Service No     Blood Transfusions Not Asked     Caffeine Concern No     Occupational Exposure No     Hobby Hazards No     Sleep Concern No     Stress Concern No     Weight Concern No     Special Diet No     Back Care No     Exercise Yes     Comment: walks 4-6x  week for 20-30 min. each     Bike Helmet Not Asked     Seat Belt Yes     Self-Exams Yes     Parent/sibling w/ CABG, MI or angioplasty before 65F 55M? No   Social History Narrative    .  Recently retired. 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        She lives with a renter.         She exercises 50 minutes three times a week.     Social Determinants of Health     Financial Resource Strain: Not on file   Food Insecurity: Not on file   Transportation Needs: Not on file   Physical Activity: Not on file   Stress: Not on file   Social Connections: Not on file   Intimate Partner Violence: Not on file   Housing Stability: Not on file       MEDS:  See EMR, reviewed  ALL:  See EMR, reviewed    REVIEW OF SYSTEMS:  CONSTITUTIONAL:NEGATIVE for fever, chills, change in weight  INTEGUMENTARY/SKIN: NEGATIVE  for worrisome rashes, moles or lesions  EYES: NEGATIVE for vision changes or irritation  ENT/MOUTH: NEGATIVE for ear, mouth and throat problems  RESP:NEGATIVE for significant cough or SOB  BREAST: NEGATIVE for masses, tenderness or discharge  CV: NEGATIVE for chest pain, palpitations or peripheral edema  GI: NEGATIVE for nausea, abdominal pain, heartburn, or change in bowel habits  :NEGATIVE for frequency, dysuria, or hematuria  :NEGATIVE for frequency, dysuria, or hematuria  NEURO: NEGATIVE for weakness, dizziness or paresthesias  ENDOCRINE: NEGATIVE for temperature intolerance, skin/hair changes  HEME/ALLERGY/IMMUNE: NEGATIVE for bleeding problems  PSYCHIATRIC: NEGATIVE for changes in mood or affect      Objective: There is minimal tenderness over the right distal fifth and fourth metatarsals.  She can flex and extend the toes.  Sensation is intact.  Appropriate conversation and affect.    We reviewed x-rays that show prominent new bone formation at the right distal fifth and fourth metatarsal heads.    Assessment right-sided foot fracture, healing    Plan: She plans on treating the next 2 weeks as a weaning process from the postop shoe.  She will try weaning to a regular shoe as tolerated.  She will look for continued improvements over the next 2 months.  She will follow-up as needed.

## 2022-06-15 NOTE — LETTER
6/15/2022    Nadira Perez  [address]  [facility] [address]  [phone] (home) [phone] (work)    :     1950      To Whom it May Concern:    This patient indicates that she recently had a CT scan of her lumbar spine denied by insurance.  This should not be the case.  The patient had an acute injury due to a fall on 2022.  As a result of that fall she had a right-sided distal fibular fracture, a right-sided forefoot fracture, and right and left hand fractures. She had acute low back pain associated with the fall.  She had hardware in her back from a previous lumbar spine fusion surgery.  Therefore it was appropriate and necessary that she had a CT scan of her lumbar spine performed on 2022.      Sincerely,        Chaitanya Miguel MD

## 2022-06-17 ENCOUNTER — OFFICE VISIT (OUTPATIENT)
Dept: OPHTHALMOLOGY | Facility: CLINIC | Age: 72
End: 2022-06-17
Payer: COMMERCIAL

## 2022-06-17 ENCOUNTER — LAB (OUTPATIENT)
Dept: LAB | Facility: CLINIC | Age: 72
End: 2022-06-17

## 2022-06-17 ENCOUNTER — MYC MEDICAL ADVICE (OUTPATIENT)
Dept: INTERNAL MEDICINE | Facility: CLINIC | Age: 72
End: 2022-06-17

## 2022-06-17 ENCOUNTER — ANESTHESIA EVENT (OUTPATIENT)
Dept: SURGERY | Facility: AMBULATORY SURGERY CENTER | Age: 72
End: 2022-06-17
Payer: COMMERCIAL

## 2022-06-17 VITALS — HEIGHT: 66 IN | BODY MASS INDEX: 27.16 KG/M2 | WEIGHT: 169 LBS

## 2022-06-17 DIAGNOSIS — Z98.890 POSTOPERATIVE EYE STATE: Primary | ICD-10-CM

## 2022-06-17 DIAGNOSIS — M48.062 SPINAL STENOSIS OF LUMBAR REGION WITH NEUROGENIC CLAUDICATION: ICD-10-CM

## 2022-06-17 DIAGNOSIS — Z11.59 ENCOUNTER FOR SCREENING FOR OTHER VIRAL DISEASES: ICD-10-CM

## 2022-06-17 DIAGNOSIS — M51.369 DDD (DEGENERATIVE DISC DISEASE), LUMBAR: ICD-10-CM

## 2022-06-17 LAB — SARS-COV-2 RNA RESP QL NAA+PROBE: NEGATIVE

## 2022-06-17 PROCEDURE — 99024 POSTOP FOLLOW-UP VISIT: CPT | Mod: GC | Performed by: OPHTHALMOLOGY

## 2022-06-17 PROCEDURE — U0003 INFECTIOUS AGENT DETECTION BY NUCLEIC ACID (DNA OR RNA); SEVERE ACUTE RESPIRATORY SYNDROME CORONAVIRUS 2 (SARS-COV-2) (CORONAVIRUS DISEASE [COVID-19]), AMPLIFIED PROBE TECHNIQUE, MAKING USE OF HIGH THROUGHPUT TECHNOLOGIES AS DESCRIBED BY CMS-2020-01-R: HCPCS | Performed by: OPHTHALMOLOGY

## 2022-06-17 ASSESSMENT — MARGIN REFLEX DISTANCE
OD_MRD1: 2.5
OS_MRD1: 3.5

## 2022-06-17 ASSESSMENT — VISUAL ACUITY
OS_SC: 20/70
OS_PH_SC+: -1
METHOD: SNELLEN - LINEAR
OD_SC: 20/25
OD_PH_SC+: -2
OD_SC+: -2
OS_PH_SC: 20/30
OD_PH_SC: 20/20

## 2022-06-17 ASSESSMENT — TONOMETRY
OD_IOP_MMHG: 14
OS_IOP_MMHG: 15
IOP_METHOD: ICARE

## 2022-06-17 ASSESSMENT — EXTERNAL EXAM - LEFT EYE: OS_EXAM: NORMAL

## 2022-06-17 ASSESSMENT — EXTERNAL EXAM - RIGHT EYE: OD_EXAM: NORMAL

## 2022-06-17 NOTE — PROGRESS NOTES
Chief Complaints and History of Present Illnesses   Patient presents with     Follow Up     S/P UPPER BLEPHAROPLASTY, BILATERAL/ General  BLEPHAROPLASTY, LOWER EYELID, BILATERAL, COSMETIC 5/16/22        Chief Complaint(s) and History of Present Illness(es)     Follow Up     In both eyes.  Associated symptoms include dryness.  Negative for eye   pain, tearing, itching and discharge.  Treatments tried include artificial   tears.  Pain was noted as 0/10. Additional comments: S/P UPPER   BLEPHAROPLASTY, BILATERAL/ General  BLEPHAROPLASTY, LOWER EYELID, BILATERAL, COSMETIC 5/16/22                 Comments     Patient reports that she is not feeling like both eye lids are raised   enough or do not appear much different. Question if there is still some   swelling with bother upper lids giving it the appearance of lids still   drooping. Very happy with the result of lower lids.   Vision each eye appear stable.  No pain at all but some irritation left eye > OD. Question if sutures are   irritating yet. Did have a suture sticking out of LLL that she trimmed off   2 weeks.No issues with any  discharge.   Lynette Chavez, COT COT 11:19 AM June 17, 2022                          Assessment & Plan     Nadira Perez is a 71 year old female with the following diagnoses:   1. Postoperative eye state         S/p BULB + BLLB 5/6/22  - incisions healing well. Pt very happy with lower lids. Feels uppers may be a little low but explained she still has residual postop edema  * Continue antibiotic ointment or bland lubricating ointment (eg vaseline or aquaphor) to the incision site BID  * Massage along the incision BID  * Warm soaks QID until all edema and ecchymoses resolve  * Return to clinic in 3 months              Leona Campbell MD  Oculoplastics Fellow    Attending Physician Attestation:  I have seen and examined this patient with the fellow .  I have confirmed and edited as necessary the chief complaint(s), history of present  illness, review of systems, relevant history, and examination findings as documented by others.  I have personally reviewed the relevant tests, images, and reports as documented above.  I have confirmed and edited as necessary the assessment and plan and agree with this note.    - Jemal Sanchez MD 11:52 AM 6/17/2022

## 2022-06-17 NOTE — PATIENT INSTRUCTIONS
Continue antibiotic ointment or bland lubricating ointment (eg vaseline or aquaphor) to the incision(s) nightly.   Gently massage along the incision(s) two times a day.  Use warm soaks over the incision(s) two times a day until swelling and bruises resolve.

## 2022-06-17 NOTE — NURSING NOTE
Chief Complaints and History of Present Illnesses   Patient presents with     Follow Up     S/P UPPER BLEPHAROPLASTY, BILATERAL/ General  BLEPHAROPLASTY, LOWER EYELID, BILATERAL, COSMETIC 5/16/22        Chief Complaint(s) and History of Present Illness(es)     Follow Up     Laterality: both eyes    Associated symptoms: dryness.  Negative for eye pain, tearing, itching and discharge    Treatments tried: artificial tears    Pain scale: 0/10    Comments: S/P UPPER BLEPHAROPLASTY, BILATERAL/ General  BLEPHAROPLASTY, LOWER EYELID, BILATERAL, COSMETIC 5/16/22                 Comments     Patient reports that she is not feeling like both eye lids are raised enough or do not appear much different. Question if there is still some swelling with bother upper lids giving it the appearance of lids still drooping. Very happy with the result of lower lids.   Vision each eye appear stable.  No pain at all but some irritation left eye > OD. Question if sutures are irritating yet. Did have a suture sticking out of LLL that she trimmed off 2 weeks.No issues with any  discharge.   Lynette Chavez, COT COT 11:19 AM June 17, 2022

## 2022-06-20 ENCOUNTER — ANESTHESIA (OUTPATIENT)
Dept: SURGERY | Facility: AMBULATORY SURGERY CENTER | Age: 72
End: 2022-06-20
Payer: COMMERCIAL

## 2022-06-20 ENCOUNTER — HOSPITAL ENCOUNTER (OUTPATIENT)
Facility: AMBULATORY SURGERY CENTER | Age: 72
Discharge: HOME | End: 2022-06-20
Attending: OPHTHALMOLOGY
Payer: COMMERCIAL

## 2022-06-20 VITALS
HEART RATE: 70 BPM | DIASTOLIC BLOOD PRESSURE: 90 MMHG | HEIGHT: 66 IN | WEIGHT: 169 LBS | TEMPERATURE: 97.9 F | BODY MASS INDEX: 27.16 KG/M2 | OXYGEN SATURATION: 98 % | RESPIRATION RATE: 16 BRPM | SYSTOLIC BLOOD PRESSURE: 136 MMHG

## 2022-06-20 DIAGNOSIS — H43.812 PVD (POSTERIOR VITREOUS DETACHMENT), LEFT EYE: ICD-10-CM

## 2022-06-20 DIAGNOSIS — H43.392 VITREOUS OPACITIES OF LEFT EYE: ICD-10-CM

## 2022-06-20 PROCEDURE — 67036 REMOVAL OF INNER EYE FLUID: CPT | Mod: LT

## 2022-06-20 PROCEDURE — 67036 REMOVAL OF INNER EYE FLUID: CPT | Mod: 79 | Performed by: OPHTHALMOLOGY

## 2022-06-20 RX ORDER — HYDROMORPHONE HYDROCHLORIDE 1 MG/ML
0.2 INJECTION, SOLUTION INTRAMUSCULAR; INTRAVENOUS; SUBCUTANEOUS EVERY 5 MIN PRN
Status: DISCONTINUED | OUTPATIENT
Start: 2022-06-20 | End: 2022-06-21 | Stop reason: HOSPADM

## 2022-06-20 RX ORDER — PREDNISOLONE ACETATE 10 MG/ML
1 SUSPENSION/ DROPS OPHTHALMIC 4 TIMES DAILY
Qty: 5 ML | Refills: 1 | Status: SHIPPED | OUTPATIENT
Start: 2022-06-20 | End: 2022-07-16

## 2022-06-20 RX ORDER — FENTANYL CITRATE 50 UG/ML
25 INJECTION, SOLUTION INTRAMUSCULAR; INTRAVENOUS EVERY 5 MIN PRN
Status: DISCONTINUED | OUTPATIENT
Start: 2022-06-20 | End: 2022-06-21 | Stop reason: HOSPADM

## 2022-06-20 RX ORDER — BALANCED SALT SOLUTION 6.4; .75; .48; .3; 3.9; 1.7 MG/ML; MG/ML; MG/ML; MG/ML; MG/ML; MG/ML
SOLUTION OPHTHALMIC PRN
Status: DISCONTINUED | OUTPATIENT
Start: 2022-06-20 | End: 2022-06-20 | Stop reason: HOSPADM

## 2022-06-20 RX ORDER — MEPERIDINE HYDROCHLORIDE 25 MG/ML
12.5 INJECTION INTRAMUSCULAR; INTRAVENOUS; SUBCUTANEOUS
Status: DISCONTINUED | OUTPATIENT
Start: 2022-06-20 | End: 2022-06-21 | Stop reason: HOSPADM

## 2022-06-20 RX ORDER — HYDROCODONE BITARTRATE AND ACETAMINOPHEN 5; 325 MG/1; MG/1
1 TABLET ORAL EVERY 6 HOURS PRN
Qty: 30 TABLET | Refills: 0 | Status: SHIPPED | OUTPATIENT
Start: 2022-06-20 | End: 2022-06-27

## 2022-06-20 RX ORDER — OXYCODONE HYDROCHLORIDE 5 MG/1
5 TABLET ORAL EVERY 4 HOURS PRN
Status: DISCONTINUED | OUTPATIENT
Start: 2022-06-20 | End: 2022-06-21 | Stop reason: HOSPADM

## 2022-06-20 RX ORDER — SODIUM CHLORIDE, SODIUM LACTATE, POTASSIUM CHLORIDE, CALCIUM CHLORIDE 600; 310; 30; 20 MG/100ML; MG/100ML; MG/100ML; MG/100ML
INJECTION, SOLUTION INTRAVENOUS CONTINUOUS
Status: DISCONTINUED | OUTPATIENT
Start: 2022-06-20 | End: 2022-06-21 | Stop reason: HOSPADM

## 2022-06-20 RX ORDER — FENTANYL CITRATE 50 UG/ML
INJECTION, SOLUTION INTRAMUSCULAR; INTRAVENOUS PRN
Status: DISCONTINUED | OUTPATIENT
Start: 2022-06-20 | End: 2022-06-20

## 2022-06-20 RX ORDER — CYCLOPENTOLAT/TROPIC/PHENYLEPH 1%-1%-2.5%
1 DROPS (EA) OPHTHALMIC (EYE)
Status: COMPLETED | OUTPATIENT
Start: 2022-06-20 | End: 2022-06-20

## 2022-06-20 RX ORDER — OFLOXACIN 3 MG/ML
1-2 SOLUTION/ DROPS OPHTHALMIC 4 TIMES DAILY
Qty: 5 ML | Refills: 0 | Status: SHIPPED | OUTPATIENT
Start: 2022-06-20 | End: 2022-07-16

## 2022-06-20 RX ORDER — ONDANSETRON 4 MG/1
4 TABLET, ORALLY DISINTEGRATING ORAL EVERY 30 MIN PRN
Status: DISCONTINUED | OUTPATIENT
Start: 2022-06-20 | End: 2022-06-21 | Stop reason: HOSPADM

## 2022-06-20 RX ORDER — LIDOCAINE HYDROCHLORIDE 20 MG/ML
INJECTION, SOLUTION INFILTRATION; PERINEURAL PRN
Status: DISCONTINUED | OUTPATIENT
Start: 2022-06-20 | End: 2022-06-20

## 2022-06-20 RX ORDER — ONDANSETRON 2 MG/ML
INJECTION INTRAMUSCULAR; INTRAVENOUS PRN
Status: DISCONTINUED | OUTPATIENT
Start: 2022-06-20 | End: 2022-06-20

## 2022-06-20 RX ORDER — FENTANYL CITRATE 50 UG/ML
25 INJECTION, SOLUTION INTRAMUSCULAR; INTRAVENOUS
Status: DISCONTINUED | OUTPATIENT
Start: 2022-06-20 | End: 2022-06-21 | Stop reason: HOSPADM

## 2022-06-20 RX ORDER — ACETAMINOPHEN 325 MG/1
975 TABLET ORAL ONCE
Status: COMPLETED | OUTPATIENT
Start: 2022-06-20 | End: 2022-06-20

## 2022-06-20 RX ORDER — LIDOCAINE 40 MG/G
CREAM TOPICAL
Status: DISCONTINUED | OUTPATIENT
Start: 2022-06-20 | End: 2022-06-21 | Stop reason: HOSPADM

## 2022-06-20 RX ORDER — ONDANSETRON 2 MG/ML
4 INJECTION INTRAMUSCULAR; INTRAVENOUS EVERY 30 MIN PRN
Status: DISCONTINUED | OUTPATIENT
Start: 2022-06-20 | End: 2022-06-21 | Stop reason: HOSPADM

## 2022-06-20 RX ORDER — ATROPINE SULFATE 10 MG/ML
SOLUTION/ DROPS OPHTHALMIC PRN
Status: DISCONTINUED | OUTPATIENT
Start: 2022-06-20 | End: 2022-06-20 | Stop reason: HOSPADM

## 2022-06-20 RX ORDER — PROPOFOL 10 MG/ML
INJECTION, EMULSION INTRAVENOUS PRN
Status: DISCONTINUED | OUTPATIENT
Start: 2022-06-20 | End: 2022-06-20

## 2022-06-20 RX ORDER — DEXAMETHASONE SODIUM PHOSPHATE 4 MG/ML
INJECTION, SOLUTION INTRA-ARTICULAR; INTRALESIONAL; INTRAMUSCULAR; INTRAVENOUS; SOFT TISSUE PRN
Status: DISCONTINUED | OUTPATIENT
Start: 2022-06-20 | End: 2022-06-20 | Stop reason: HOSPADM

## 2022-06-20 RX ADMIN — ONDANSETRON 4 MG: 2 INJECTION INTRAMUSCULAR; INTRAVENOUS at 07:31

## 2022-06-20 RX ADMIN — ACETAMINOPHEN 975 MG: 325 TABLET ORAL at 06:35

## 2022-06-20 RX ADMIN — Medication 1 DROP: at 06:44

## 2022-06-20 RX ADMIN — Medication 1 DROP: at 06:42

## 2022-06-20 RX ADMIN — LIDOCAINE HYDROCHLORIDE 80 MG: 20 INJECTION, SOLUTION INFILTRATION; PERINEURAL at 07:20

## 2022-06-20 RX ADMIN — Medication 1 DROP: at 06:45

## 2022-06-20 RX ADMIN — SODIUM CHLORIDE, SODIUM LACTATE, POTASSIUM CHLORIDE, CALCIUM CHLORIDE: 600; 310; 30; 20 INJECTION, SOLUTION INTRAVENOUS at 06:30

## 2022-06-20 RX ADMIN — FENTANYL CITRATE 50 MCG: 50 INJECTION, SOLUTION INTRAMUSCULAR; INTRAVENOUS at 07:26

## 2022-06-20 RX ADMIN — PROPOFOL 50 MG: 10 INJECTION, EMULSION INTRAVENOUS at 07:26

## 2022-06-20 NOTE — ANESTHESIA PREPROCEDURE EVALUATION
Anesthesia Pre-Procedure Evaluation    Patient: Nadira Perez   MRN: [id number] : 1950        Procedure : Procedure(s):  LEFT EYE VITRECTOMY, PARS PLANA APPROACH, USING 25-GAUGE INSTRUMENTS          Past Medical History:   Diagnosis Date     Arthritis      Bone disease      Breast cancer (H)      H/O kyphoplasty      Hearing problem      History of kidney stones      History of radiation therapy      Hyperlipemia      Hypertension      Hypopotassemia      Kidney problem      Lymph edema      Medullary sponge kidney      Osteopenia      PONV (postoperative nausea and vomiting)      Reduced vision      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     Thyroid disease       Past Surgical History:   Procedure Laterality Date     ABDOMEN SURGERY      ovarian cyst, mesh     ARTHRODESIS WRIST Right      ARTHRODESIS WRIST  2013    Procedure: ARTHRODESIS WRIST;  left wrist scaphoid excision, four bone fusion, iliac crest bone graft  ( Mac with block);  Surgeon: Av Mendez MD;  Location:  OR     BIOPSY      skin, vaginal     BLEPHAROPLASTY BILATERAL Bilateral 2022    Procedure: UPPER BLEPHAROPLASTY, BILATERAL;  Surgeon: Jemal Sanchez MD;  [place] [facility] [place]     CATARACT IOL, RT/LT Right 2018     CATARACT IOL, RT/LT Left 2018     COLONOSCOPY  2013    Procedure: COMBINED COLONOSCOPY, SINGLE BIOPSY/POLYPECTOMY BY BIOPSY;  COLONOSCOPY;  Surgeon: Dom Alvarez MD;  Location:  GI     COSMETIC BLEPHAROPLASTY LOWER LIDS BILATERAL Bilateral 2022    Procedure: BLEPHAROPLASTY, LOWER EYELID, BILATERAL, COSMETIC;  Surgeon: Jemal Sanchez MD;  Location: [facility] [place]     ESOPHAGOSCOPY, GASTROSCOPY, DUODENOSCOPY (EGD), COMBINED N/A 2016    Procedure: COMBINED ESOPHAGOSCOPY, GASTROSCOPY, DUODENOSCOPY (EGD);  Surgeon: Quinten Feliciano MD;  Location:  GI     EXTERNAL EAR SURGERY      right     EYE SURGERY      radial keratomy     FUSION, SPINE, INTERBODY, OBLIQUE  ANTERIOR AND LUMBAR, 2 LEVELS, POST APPROACH, USING OTS N/A 11/18/2021    Procedure: Part 1: Oblique Anterior Interbody Fusion at Lumbar 5 to sacral 1 with use of Bone Morphogenic Protein,;  Surgeon: Serge Ramirez MD;  Location: [facility] OR     GRAFT BONE FROM ILIAC CREST  02/14/2013    Procedure: GRAFT BONE FROM ILIAC CREST;  mac with block and local infilitration;  Surgeon: Av Mendez MD;  [facility]     HC BREATH HYDROGEN TEST N/A 10/14/2016    Procedure: HYDROGEN BREATH TEST;  Surgeon: Cheri Barron MD;  Location: [facility] GI     HERNIA REPAIR      umbilical age 18 mos.     HYSTERECTOMY TOTAL ABDOMINAL  05/03/2000     MASTECTOMY MODIFIED RADICAL Bilateral     bilateral; right breast prophylactic     OPTICAL TRACKING SYSTEM FUSION POSTERIOR SPINE LUMBAR N/A 11/18/2021    Procedure: Open Posterior Instrumented Spinal Fusion at Lumbar 5 to Sacral 1, with grimm aguayo osteotomy, use of O-Arm/Stealth;  Surgeon: Serge Ramirez MD;  Location: [facility] OR     PARATHYROIDECTOMY  09/23/2004    R SUPERIOR     PARATHYROIDECTOMY  09/23/2004    parathyroid resection, subtotal     RELEASE CARPAL TUNNEL Right 12/2/2021    Procedure: RIGHT CARPAL TUNNEL RELEASE, RIGHT WRIST HARDWARE REMOVAL, RIGHT CARPAL BOSS EXCISION;  Surgeon: Rolan Conley MD;  Location: [facility] OR     REMOVE HARDWARE HAND  09/24/2013    Procedure: REMOVE HARDWARE HAND;  Left Hand Screw Removal        RHINOPLASTY  1968     thyr proc skin closed cosmetic manner by subcuticular stitch  01/23/2009     THYROPLASTY  10/09/2009     TONSILLECTOMY  1977     WRIST SURGERY      wrist arthrodesis      Allergies   Allergen Reactions     Erythromycin Nausea     Penicillins Hives     Around age 6 - doesn't recall full reaction, mother told her it was hives.     Has tolerated cephalsporins.       Social History     Tobacco Use     Smoking status: Never Smoker     Smokeless tobacco: Never Used   Substance Use Topics     Alcohol use:  Not Currently     Alcohol/week: 7.0 standard drinks     Types: 7 Glasses of wine per week     Comment: Rare to occasional      Wt Readings from Last 1 Encounters:   06/20/22 76.7 kg (169 lb)        Anesthesia Evaluation   Pt has had prior anesthetic.         ROS/MED HX  ENT/Pulmonary:  - neg pulmonary ROS     Neurologic:  - neg neurologic ROS     Cardiovascular:     (+) hypertension-----    METS/Exercise Tolerance:     Hematologic:       Musculoskeletal:       GI/Hepatic:       Renal/Genitourinary:     (+) renal disease, Nephrolithiasis ,     Endo:     (+) thyroid problem,     Psychiatric/Substance Use:  - neg psychiatric ROS     Infectious Disease:       Malignancy:       Other:            Physical Exam    Airway  airway exam normal           Respiratory Devices and Support     Nasal Canula      Dental  no notable dental history         Cardiovascular   cardiovascular exam normal          Pulmonary   pulmonary exam normal                OUTSIDE LABS:  CBC:   Lab Results   Component Value Date    WBC 7.0 01/12/2022    WBC 9.9 11/19/2021    HGB 11.8 01/12/2022    HGB 9.4 (L) 11/19/2021    HCT 38.7 01/12/2022    HCT 28.6 (L) 11/19/2021     01/12/2022     11/19/2021     BMP:   Lab Results   Component Value Date     01/12/2022     12/08/2021    POTASSIUM 3.9 04/15/2022    POTASSIUM 4.0 01/12/2022    CHLORIDE 108 01/12/2022    CHLORIDE 107 12/08/2021    CO2 27 01/12/2022    CO2 28 12/08/2021    BUN 19 01/12/2022    BUN 16 12/08/2021    CR 0.63 01/12/2022    CR 0.60 12/08/2021    GLC 96 01/12/2022    GLC 96 12/08/2021     COAGS:   Lab Results   Component Value Date    PTT 25 08/20/2010    INR 0.85 (L) 08/20/2010     POC:   Lab Results   Component Value Date    BGM 98 01/16/2008     HEPATIC:   Lab Results   Component Value Date    ALBUMIN 4.3 01/12/2022    PROTTOTAL 7.7 01/12/2022    ALT 24 01/12/2022    AST 16 01/12/2022    ALKPHOS 114 01/12/2022    BILITOTAL 0.4 01/12/2022     OTHER:   Lab  Results   Component Value Date    PH 7.39 11/18/2021    A1C 5.9 (H) 04/15/2022    RAMBO 9.7 01/12/2022    PHOS 3.7 01/12/2022    MAG 2.6 (H) 01/12/2022    LIPASE 177 09/15/2016    TSH 1.54 01/12/2022    T4 1.05 11/21/2011    CRP <2.9 12/08/2021    SED 31 (H) 12/08/2021       Anesthesia Plan    ASA Status:  2   NPO Status:  NPO Appropriate    Anesthesia Type: MAC.     - Reason for MAC: straight local not clinically adequate   Induction: Intravenous.           Consents    Anesthesia Plan(s) and associated risks, benefits, and realistic alternatives discussed. Questions answered and patient/representative(s) expressed understanding.    - Discussed:     - Discussed with:  Patient      - Extended Intubation/Ventilatory Support Discussed: No.      - Patient is DNR/DNI Status: No    Use of blood products discussed: No .     Postoperative Care    Pain management: Oral pain medications.   PONV prophylaxis: Ondansetron (or other 5HT-3)     Comments:                Willis Young MD

## 2022-06-20 NOTE — ANESTHESIA CARE TRANSFER NOTE
Patient: Nadira Perez    Procedure: Procedure(s):  LEFT EYE VITRECTOMY, PARS PLANA APPROACH, USING 25-GAUGE INSTRUMENTS, laser       Diagnosis: PVD (posterior vitreous detachment), left eye [H43.812]  Vitreous opacities of left eye [H43.392]  Diagnosis Additional Information: No value filed.    Anesthesia Type:   MAC     Note:    Oropharynx: oropharynx clear of all foreign objects and spontaneously breathing  Level of Consciousness: awake  Oxygen Supplementation: room air    Independent Airway: airway patency satisfactory and stable  Dentition: dentition unchanged  Vital Signs Stable: post-procedure vital signs reviewed and stable  Report to RN Given: handoff report given  Patient transferred to: Phase II    Handoff Report: Identifed the Patient, Identified the Reponsible Provider, Reviewed the pertinent medical history, Discussed the surgical course, Reviewed Intra-OP anesthesia mangement and issues during anesthesia, Set expectations for post-procedure period and Allowed opportunity for questions and acknowledgement of understanding      Vitals:  Vitals Value Taken Time   BP     Temp     Pulse     Resp     SpO2         Electronically Signed By: MERCEDES Velez CRNA  June 20, 2022  8:17 AM

## 2022-06-20 NOTE — ANESTHESIA POSTPROCEDURE EVALUATION
Patient: Nadira Perez    Procedure: Procedure(s):  LEFT EYE VITRECTOMY, PARS PLANA APPROACH, USING 25-GAUGE INSTRUMENTS, laser       Anesthesia Type:  MAC    Note:  Disposition: Outpatient   Postop Pain Control: Uneventful            Sign Out: Well controlled pain   PONV: No   Neuro/Psych: Uneventful            Sign Out: Acceptable/Baseline neuro status   Airway/Respiratory: Uneventful            Sign Out: Acceptable/Baseline resp. status   CV/Hemodynamics: Uneventful            Sign Out: Acceptable CV status; No obvious hypovolemia; No obvious fluid overload   Other NRE: NONE   DID A NON-ROUTINE EVENT OCCUR?            Last vitals:  Vitals Value Taken Time   /90 06/20/22 0844   Temp 36.6  C (97.9  F) 06/20/22 0844   Pulse 70 06/20/22 0844   Resp 16 06/20/22 0844   SpO2 98 % 06/20/22 0844       Electronically Signed By: Willis Young MD  June 20, 2022  8:51 AM

## 2022-06-20 NOTE — OP NOTE
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  After informed consent was obtained, she was brought into the operating room where retrobulbar anesthesia was administered.  The eye was then prepped and draped in the usual fashion for ophthalmic surgery.    Attention was then turned to the vitrectomy.  Marks were made on the sclera inferotemporally, superotemporally, and superonasally 3.5 mm posterior to the limbus.  The 25g transscleral cannulas were inserted through the sclera using the trocars.  The infusion cannula was connected to the inferonasal cannula and directly visualized to verify it was in the correct location.  A core vitrectomy was performed and the vitreous was stained with kenalog.  The periphery was trimmed with depression and inspected.    There was a horseshoe tear infratemporally with surrounding laser barricade and no associated subretinal fluid. There was a lattice degeneration area temporal mid periphery. There was a mild extramacular posterior epiretinal membrane.     Additional laser was placed around the anterior border of the flap and also around a patch of lattice temporally and at a few points along the ora where there was vitreoretinal traction.     There was a capsular flap from a previous capsulotomy procedure that was close to the visual axis. This was removed with the vitrector. The cannulas were removed.     The sclerotomies were sutured as needed with 6-0 plain  suture and were tight.  The pressure was checked and verified to be appropriate.      A drop of atropine was placed in the eye with Maxtrol ointment.  A pad and hudson shield were taped over the eye.    The patient left the OR with no complications.      The surgery was assisted by Branden Ibanez MD, because no qualified resident was available to assist on the day of surgery.  Due to the delicate and complex nature of this surgery, a skilled assistant like Dr. Ibanez was required. He assisted with pars plana vitrectomy.  I was present for the entire surgery.

## 2022-06-20 NOTE — DISCHARGE INSTRUCTIONS
"[facility]  Home Care Following Anesthesia  For 24 hours after surgery:  Get plenty of rest.  A responsible adult must stay with you for at least 24 hours after you leave the surgery center.  Do not drive or use heavy equipment.  If you have weakness or tingling, don't drive or use heavy equipment until this feeling goes away.   Do not drink alcohol.   Avoid strenuous or risky activities.  Ask for help when climbing stairs.  You may feel lightheaded.  IF so, sit for a few minutes before standing.  Have someone help you get up.   If you have nausea (feel sick to your stomach): Drink only clear liquids such as apple juice, ginger ale, broth or 7-Up.  Rest may also help.  Be sure to drink enough fluids.  Move to a regular diet as you feel able.   You may have a slight fever.  Call the doctor if your fever is over 100 F (37.7 C) (taken under the tongue) or lasts longer than 24 hours.  You may have a dry mouth, a sore throat, muscle aches or trouble sleeping. These should go away after 24 hours.  Do not make important or legal decisions.   It is recommended to avoid smoking.        Today you received a Marcaine or bupivacaine block to numb the nerves near your surgery site.  This is a block using local anesthetic or \"numbing\" medication injected around the nerves to anesthetize or \"numb\" the area supplied by those nerves.  This block is injected into the muscle layer near your surgical site.  The medication may numb the location where you had surgery for 6-18 hours, but may last up to 24 hours.  If your surgical site is an arm or leg you should be careful with your affected limb, since it is possible to injure your limb without being aware of it due to the numbing.  Until full feeling returns, you should guard against bumping or hitting your limb, and avoid extreme hot or cold temperatures on the skin.  As the block wears off, the feeling will return as a tingling or prickly " sensation near your surgical site.  You will experience more discomfort from your incision as the feeling returns.  You may want to take a pain pill (a narcotic or Tylenol if this was prescribed by your surgeon) when you start to experience mild pain before the pain beccomes more severe.  If your pain medications do not control your pain you should notifiy your surgeon.    Tips for taking pain medications  To get the best pain relief possible, remember these points:  Take pain medications as directed, before pain becomes severe.  Pain medication can upset your stomach: taking it with food may help.  Constipation is a common side effect of pain medication. Drink plenty of  fluids.  Eat foods high in fiber. Take a stool softener if recommended by your doctor or pharmacist.  Do not drink alcohol, drive or operate machinery while taking pain medications.  Ask about other ways to control pain, such as with heat, ice or relaxation.    Tylenol/Acetaminophen Consumption  To help encourage the safe use of acetaminophen, the makers of TYLENOL  have lowered the maximum daily dose for single-ingredient Extra Strength TYLENOL  (acetaminophen) products sold in the [place] from 8 pills per day (4,000 mg) to 6 pills per day (3,000 mg). The dosing interval has also changed from 2 pills every 4-6 hours to 2 pills every 6 hours.  If you feel your pain relief is insufficient, you may take Tylenol/Acetaminophen in addition to your narcotic pain medication.   Be careful not to exceed 3,000 mg of Tylenol/Acetaminophen in a 24 hour period from all sources.  If you are taking extra strength Tylenol/acetaminophen (500 mg), the maximum dose is 6 tablets in 24 hours.  If you are taking regular strength acetaminophen (325 mg), the maximum dose is 9 tablets in 24 hours.  Do not take Tylenol before 12:35 pm today.    Call a doctor for any of the following:  Signs of infection (fever, growing tenderness at the surgery site, a large amount of drainage  or bleeding, severe pain, foul-smelling drainage, redness, swelling).  It has been over 8 to 10 hours since surgery and you are still not able to urinate (pass water).  Headache for over 24 hours.  Numbness, tingling or weakness the day after surgery (if you had spinal anesthesia).  Signs of Covid-19 infection (temperature over 100 degrees, shortness of breath, cough, loss of taste/smell, generalized body aches, persistent headache, chills, sore throat, nausea/vomiting/diarrhea)  Your doctor is:       Dr. Felix Pizano, Ophthalmology: [phone]               Or dial [phone] and ask for the resident on call for:  Ophthalmology  For emergency care, call the:  [place] [facility]:  [phone] (TTY for hearing impaired: [phone])                   POST-OPERATIVE INSTRUCTIONS FOLLOWING SURGERY    Felix Oliveros MD, PhD  Department of Ophthalmology  [facility]  [phone]    FOLLOW UP:  You have a follow up appointment tomorrow at 10:30 AM at the Eye Clinic in the [facility] 9th floor.      EYE DROPS  Drops will be given to you after the surgery.  They DO NOT need to be used until after you are seen in clinic.  DO NOT disturb your bandage the first night.      When using more than one drop, separate them by 3 minutes between drops.  Common times to place drops are breakfast, lunch, dinner, and bedtime.  Do not stop your drops without discussing with our office.  If you run out before your appointment, call and we will send in a refill.    Ofloxacin (tan top) --- 4 times per day  Pred Forte (prednisolone acetate) --- 4 times per day      HEAD POSITIONING  No head positioning is required.       ACTIVITY:  No heavy lifting after surgery  Keep the bandage in place until you are seen tomorrow   Do not get your bandage wet      PAIN MEDICATION   It is common to have some mild or moderate discomfort after eye surgery.  Tylenol or ibuprofen may be taken if you  "don't have any other general health conditions that prevent you from taking these.      WHAT TO EXPECT  It is common for the eye to to have a blood tinged discharge for a few days after surgery  It may feel irritated (as if something were in your eye), for there to be clear discharge (thicker in the mornings upon awakening), and for it to be bloodshot for 2-3 weeks following retina surgery.       WHAT TO WATCH OUT FOR  If you experience any of the following \"RSVP Symptoms\", you should call immediately:  Worsening Redness  Worsening Sensitivity to light  Worsening Vision, including new flashing lights or floaters  Worsening Pain, including nausea/vomiting      For any of the symptoms listed above, or for other concerns, call [phone] and ask to speak to the clinic nurse.  If you call after hours, follow to prompts to reach the doctor on call.            "

## 2022-06-21 ENCOUNTER — OFFICE VISIT (OUTPATIENT)
Dept: OPHTHALMOLOGY | Facility: CLINIC | Age: 72
End: 2022-06-21
Attending: OPHTHALMOLOGY
Payer: COMMERCIAL

## 2022-06-21 DIAGNOSIS — Z98.890 POSTOPERATIVE EYE STATE: Primary | ICD-10-CM

## 2022-06-21 PROCEDURE — 99024 POSTOP FOLLOW-UP VISIT: CPT | Mod: GC | Performed by: OPHTHALMOLOGY

## 2022-06-21 PROCEDURE — G0463 HOSPITAL OUTPT CLINIC VISIT: HCPCS

## 2022-06-21 RX ORDER — HYDROCODONE BITARTRATE AND ACETAMINOPHEN 5; 325 MG/1; MG/1
1 TABLET ORAL EVERY 6 HOURS PRN
Qty: 30 TABLET | Refills: 0 | OUTPATIENT
Start: 2022-07-20

## 2022-06-21 ASSESSMENT — VISUAL ACUITY
OD_SC+: -2
OD_SC: 20/30
METHOD: SNELLEN - LINEAR
OD_PH_SC+: -2
OS_SC: 20/70
OS_PH_SC+: -1
OD_PH_SC: 20/20
OS_PH_SC: 20/30

## 2022-06-21 ASSESSMENT — CONF VISUAL FIELD
OD_NORMAL: 1
OS_NORMAL: 1
METHOD: COUNTING FINGERS

## 2022-06-21 ASSESSMENT — TONOMETRY
OD_IOP_MMHG: 14
OS_IOP_MMHG: 12
IOP_METHOD: TONOPEN

## 2022-06-21 ASSESSMENT — CUP TO DISC RATIO: OS_RATIO: 0.6

## 2022-06-21 NOTE — NURSING NOTE
Chief Complaints and History of Present Illnesses   Patient presents with     Follow Up For Surgery Of Eye     Chief Complaint(s) and History of Present Illness(es)     Follow Up For Surgery Of Eye     Laterality: left eye    Associated symptoms: dryness and redness.  Negative for eye pain, flashes and floaters              Comments     Patient present following vitrectomy left eye. Patient denies pain. Patient states lashes are touching the dressing and that is something she finds irritating. Patient states some redness left eye. Patient states issues seem more like lid issues, not eye issues.     Linda Obregon, COA June 21, 2022 10:46 AM

## 2022-06-21 NOTE — PROGRESS NOTES
Ophthalmology Post-Op Note    ASSESSMENT & PLAN    # S/p PPV25+EL left eye  on 6/20/22 for floaters  06/21/22 - doing well. VA 20/70ph30, significant PEEs present (pt says patch was bothering her and she removed/replaced it). No significant pain. IOP 12. Routine care.     # Large cup:disc ratio w/ possible temporal thinning OS  06/21/22  Consider screening for glaucoma in the future with an RNFL      PLAN:  Drops:   - Prednisolone acetate (white) - 4x/day  - Ofloxacin (tan) - 4x/day    Positioning:  None    Activity:   - avoid strenuous activity for 2 weeks   - eye shield over your eye at night 1 week    Warnings:  - call us if you develop worsening vision, pain, or severe sensitivity to light    Follow up:  - 1 week for POW1 visit V/T/D    Branden Ibanez MD  Retina Fellow, PGY5    Complete documentation of historical and exam elements from today's encounter can be found in the full encounter summary report (not reduplicated in this progress note). I personally obtained the chief complaint(s) and history of present illness.  I confirmed and edited as necessary the review of systems, past medical/surgical history, family history, social history, and examination findings as documented by others; and I examined the patient myself. I personally reviewed the relevant tests, images, and reports as documented above. I formulated and edited as necessary the assessment and plan and discussed the findings and management plan with the patient and family.    Felix Oliveros MD, PhD

## 2022-06-23 NOTE — NURSING NOTE
[address] [address]  Dept: [phone]  ______________________________________________________________________________    Patient: Nadira Perez   : 1950   MRN: [id number]   May 18, 2022    INVASIVE PROCEDURE SAFETY CHECKLIST    Date: 22   Procedure:Bilateral subcutaneous Evenity injections  Patient Name: Nadira Perez  MRN: [id number]  YOB: 1950    Action: Complete sections as appropriate. Any discrepancy results in a HARD COPY until resolved.     PRE PROCEDURE:  Patient ID verified with 2 identifiers (name and  or MRN): Yes  Procedure and site verified with patient/designee (when able): Yes  Accurate consent documentation in medical record: Yes  H&P (or appropriate assessment) documented in medical record: Yes  H&P must be up to 20 days prior to procedure and updates within 24 hours of procedure as applicable: NA  Relevant diagnostic and radiology test results appropriately labeled and displayed as applicable: Yes  Procedure site(s) marked with provider initials: NA    TIMEOUT:  Time-Out performed immediately prior to starting procedure, including verbal and active participation of all team members addressing the following:Yes  * Correct patient identify  * Confirmed that the correct side and site are marked  * An accurate procedure consent form  * Agreement on the procedure to be done  * Correct patient position  * Relevant images and results are properly labeled and appropriately displayed  * The need to administer antibiotics or fluids for irrigation purposes during the procedure as applicable   * Safety precautions based on patient history or medication use    DURING PROCEDURE: Verification of correct person, site, and procedures any time the responsibility for care of the patient is transferred to another member of the care team.       Prior to injection, verified patient identity using patient's name  and date of birth.  Due to injection administration, patient instructed to remain in clinic for 15 minutes  afterwards, and to report any adverse reaction to me immediately.    Evenity injections    Drug Amount Wasted:  None.  Vial/Syringe: Syringe  Expiration Date:  04/01/2024      Gia Dewitt, ATC  May 18, 2022     Statement Selected

## 2022-06-25 DIAGNOSIS — G47.20 SLEEP PATTERN DISTURBANCE: ICD-10-CM

## 2022-06-27 RX ORDER — HYDROCODONE BITARTRATE AND ACETAMINOPHEN 5; 325 MG/1; MG/1
1 TABLET ORAL EVERY 6 HOURS PRN
Qty: 30 TABLET | Refills: 0 | Status: SHIPPED | OUTPATIENT
Start: 2022-06-27 | End: 2022-09-27

## 2022-06-28 RX ORDER — HYDROXYZINE PAMOATE 25 MG/1
25 CAPSULE ORAL AT BEDTIME
Qty: 90 CAPSULE | Refills: 3 | Status: SHIPPED | OUTPATIENT
Start: 2022-06-28 | End: 2023-07-21

## 2022-06-28 NOTE — TELEPHONE ENCOUNTER
Last Clinic Visit: 5/3/2022  [facility] [place]       Patients Bp elevated  Dr Petr Jimenez notified  States we are going to let it ride out for right now        Lj Butler RN  09/10/21 1600

## 2022-06-29 ENCOUNTER — OFFICE VISIT (OUTPATIENT)
Dept: OPHTHALMOLOGY | Facility: CLINIC | Age: 72
End: 2022-06-29
Attending: OPHTHALMOLOGY
Payer: COMMERCIAL

## 2022-06-29 DIAGNOSIS — Z98.890 POSTOPERATIVE EYE STATE: Primary | ICD-10-CM

## 2022-06-29 PROCEDURE — G0463 HOSPITAL OUTPT CLINIC VISIT: HCPCS

## 2022-06-29 PROCEDURE — 99024 POSTOP FOLLOW-UP VISIT: CPT | Mod: GC | Performed by: STUDENT IN AN ORGANIZED HEALTH CARE EDUCATION/TRAINING PROGRAM

## 2022-06-29 ASSESSMENT — VISUAL ACUITY
METHOD: SNELLEN - LINEAR
OS_PH_SC: 20/30
OD_SC: 20/30
OD_SC+: -1
OD_PH_SC: 20/20
OS_SC: 20/60

## 2022-06-29 ASSESSMENT — TONOMETRY
IOP_METHOD: TONOPEN
OS_IOP_MMHG: 17
OD_IOP_MMHG: 19

## 2022-06-29 ASSESSMENT — CUP TO DISC RATIO: OS_RATIO: 0.6

## 2022-06-29 NOTE — PROGRESS NOTES
Ophthalmology Post-Op Note    ASSESSMENT & PLAN    # S/p PPV25+EL left eye  on 6/20/22 for floaters  06/29/22 - doing well. VA 20/60ph30, significant PEEs present (pt says patch was bothering her and she removed/replaced it). No significant pain. IOP 17. Routine care.   PLAN:  Drops:   - taper Prednisolone acetate (white) - 3/2/1x/day  - stop Ofloxacin (tan) - 4x/day        # Large cup:disc ratio w/ possible temporal thinning OS  06/21/22  Consider screening for glaucoma in the future with an RNFL    Follow up:  - 2-3 months for V/T/D  Next week tech only visit for MRx    Branden Ibanez MD  Retina Fellow, PGY5    Complete documentation of historical and exam elements from today's encounter can be found in the full encounter summary report (not reduplicated in this progress note). I personally obtained the chief complaint(s) and history of present illness.  I confirmed and edited as necessary the review of systems, past medical/surgical history, family history, social history, and examination findings as documented by others; and I examined the patient myself. I personally reviewed the relevant tests, images, and reports as documented above. I formulated and edited as necessary the assessment and plan and discussed the findings and management plan with the patient and family.    Felix Oliveros MD, PhD

## 2022-06-29 NOTE — NURSING NOTE
Chief Complaints and History of Present Illnesses   Patient presents with     Post Op (Ophthalmology) Left Eye     Chief Complaint(s) and History of Present Illness(es)     Post Op (Ophthalmology) Left Eye     Laterality: left eye    Onset: 1 week ago              Comments     S/p PPV25+EL left eye  on 6/20/22 for floaters-Pt. States that she is doing well. No change in VA BE. No pain BE. No floaters LEFani Carranzacy Lilo COT 12:57 PM June 29, 2022

## 2022-07-06 ENCOUNTER — ALLIED HEALTH/NURSE VISIT (OUTPATIENT)
Dept: OPHTHALMOLOGY | Facility: CLINIC | Age: 72
End: 2022-07-06
Attending: OPHTHALMOLOGY
Payer: COMMERCIAL

## 2022-07-06 DIAGNOSIS — Z96.1 PSEUDOPHAKIA OF BOTH EYES: Primary | ICD-10-CM

## 2022-07-06 PROCEDURE — 92015 DETERMINE REFRACTIVE STATE: CPT

## 2022-07-06 PROCEDURE — 999N000103 HC STATISTIC NO CHARGE FACILITY FEE

## 2022-07-06 PROCEDURE — 99207 PR NO CHARGE COORDINATED CARE PS: CPT

## 2022-07-06 ASSESSMENT — REFRACTION_MANIFEST
OD_SPHERE: -0.50
OS_CYLINDER: +2.75
OD_AXIS: 005
OS_AXIS: 045
OD_SPHERE: +1.25
OD_CYLINDER: +0.75
OS_SPHERE: -1.50
OS_ADD: +2.50
OS_SPHERE: +0.25
OS_AXIS: 045
OD_AXIS: 005
OD_ADD: +2.50
OS_CYLINDER: +2.75
OD_CYLINDER: +0.75

## 2022-07-06 ASSESSMENT — VISUAL ACUITY
METHOD: SNELLEN - LINEAR
OD_SC: 20/30
OS_SC: 20/70

## 2022-07-16 ENCOUNTER — OFFICE VISIT (OUTPATIENT)
Dept: ORTHOPEDICS | Facility: CLINIC | Age: 72
End: 2022-07-16
Payer: COMMERCIAL

## 2022-07-16 ENCOUNTER — LAB (OUTPATIENT)
Dept: LAB | Facility: CLINIC | Age: 72
End: 2022-07-16

## 2022-07-16 DIAGNOSIS — M80.08XG PATHOLOGICAL FRACTURE OF VERTEBRA DUE TO AGE-RELATED OSTEOPOROSIS WITH DELAYED HEALING, SUBSEQUENT ENCOUNTER: Primary | ICD-10-CM

## 2022-07-16 DIAGNOSIS — E87.6 HYPOPOTASSEMIA: ICD-10-CM

## 2022-07-16 DIAGNOSIS — M80.08XG PATHOLOGICAL FRACTURE OF VERTEBRA DUE TO AGE-RELATED OSTEOPOROSIS WITH DELAYED HEALING, SUBSEQUENT ENCOUNTER: ICD-10-CM

## 2022-07-16 LAB
CALCIUM SERPL-MCNC: 9 MG/DL (ref 8.5–10.1)
CREAT SERPL-MCNC: 0.76 MG/DL (ref 0.52–1.04)
GFR SERPL CREATININE-BSD FRML MDRD: 84 ML/MIN/1.73M2
POTASSIUM BLD-SCNC: 4.3 MMOL/L (ref 3.4–5.3)

## 2022-07-16 PROCEDURE — 82310 ASSAY OF CALCIUM: CPT | Performed by: PATHOLOGY

## 2022-07-16 PROCEDURE — 84132 ASSAY OF SERUM POTASSIUM: CPT | Performed by: PATHOLOGY

## 2022-07-16 PROCEDURE — 82565 ASSAY OF CREATININE: CPT | Performed by: PATHOLOGY

## 2022-07-16 PROCEDURE — 99207 PR DROP WITH A PROCEDURE: CPT | Performed by: FAMILY MEDICINE

## 2022-07-16 PROCEDURE — 36415 COLL VENOUS BLD VENIPUNCTURE: CPT | Performed by: PATHOLOGY

## 2022-07-16 PROCEDURE — 96372 THER/PROPH/DIAG INJ SC/IM: CPT | Mod: 50 | Performed by: FAMILY MEDICINE

## 2022-07-16 NOTE — NURSING NOTE
[address] [address]  Dept: [phone]  ______________________________________________________________________________    Patient: Nadira Perez   : 1950   MRN: [id number]   2022    INVASIVE PROCEDURE SAFETY CHECKLIST    Date: 2022   Procedure:Evenity 3/12  Patient Name: Nadira Perez  MRN: [id number]  YOB: 1950    Action: Complete sections as appropriate. Any discrepancy results in a HARD COPY until resolved.     PRE PROCEDURE:  Patient ID verified with 2 identifiers (name and  or MRN): Yes  Procedure and site verified with patient/designee (when able): Yes  Accurate consent documentation in medical record: Yes  H&P (or appropriate assessment) documented in medical record: Yes  H&P must be up to 20 days prior to procedure and updates within 24 hours of procedure as applicable: Yes  Relevant diagnostic and radiology test results appropriately labeled and displayed as applicable: Yes  Procedure site(s) marked with provider initials: NA    TIMEOUT:  Time-Out performed immediately prior to starting procedure, including verbal and active participation of all team members addressing the following:Yes  * Correct patient identify  * Confirmed that the correct side and site are marked  * An accurate procedure consent form  * Agreement on the procedure to be done  * Correct patient position  * Relevant images and results are properly labeled and appropriately displayed  * The need to administer antibiotics or fluids for irrigation purposes during the procedure as applicable   * Safety precautions based on patient history or medication use    DURING PROCEDURE: Verification of correct person, site, and procedures any time the responsibility for care of the patient is transferred to another member of the care team.       Prior to injection, verified patient identity using patient's name and date of birth.  Due  to injection administration, patient instructed to remain in clinic for 15 minutes  afterwards, and to report any adverse reaction to me immediately.    Subcutaneous     Drug Amount Wasted:  None.  Vial/Syringe: Syringe  Expiration Date:  7/1/24      Pema Wang ATC  July 16, 2022

## 2022-07-16 NOTE — LETTER
7/16/2022      RE: Nadira Perez  [address]  [place] [address]     Dear Colleague,    Thank you for referring your patient, Nadira Perez, to the [facility]. Please see a copy of my visit note below.      Assessment & Plan     Pathological fracture of vertebra due to age-related osteoporosis with delayed healing, subsequent encounter  Evenity #3  - romosozumab-aqqg (EVENITY) injection 210 mg  - Calcium; Future  - Creatinine; Future    Hypopotassemia  Hx of heart arrhythmia issues  - Potassium; Future         See Patient Instructions    Return in about 4 weeks (around 8/13/2022), or if symptoms worsen or fail to improve.    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  Pt reports osteoporosis more than a pathological fracture.  Hx of hyperparathyroidism, kidney stones, and has bony growth in mouth.  Going to see oral surgery soon.    Review of Systems   Constitutional, HEENT, cardiovascular, pulmonary, gi and gu systems are negative, except as otherwise noted.      Objective    There were no vitals taken for this visit.  There is no height or weight on file to calculate BMI.  Physical Exam   NAD  nonlabored breathing  No rash on legs    Procedure: Some benefits discussed and patient was consented.  Alcohol used to clean the area of the skin and ethyl chloride to anesthetize the area.  1 injected each lateral thigh.  Band aids in place.  Well tolerated.      Again, thank you for allowing me to participate in the care of your patient.      Sincerely,    Cortney Clark MD

## 2022-07-16 NOTE — PROGRESS NOTES
Assessment & Plan     Pathological fracture of vertebra due to age-related osteoporosis with delayed healing, subsequent encounter  Evenity #3  - romosozumab-aqqg (EVENITY) injection 210 mg  - Calcium; Future  - Creatinine; Future    Hypopotassemia  Hx of heart arrhythmia issues  - Potassium; Future             See Patient Instructions    Return in about 4 weeks (around 8/13/2022), or if symptoms worsen or fail to improve.    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  Pt reports osteoporosis more than a pathological fracture.  Hx of hyperparathyroidism, kidney stones, and has bony growth in mouth.  Going to see oral surgery soon.    Review of Systems   Constitutional, HEENT, cardiovascular, pulmonary, gi and gu systems are negative, except as otherwise noted.      Objective    There were no vitals taken for this visit.  There is no height or weight on file to calculate BMI.  Physical Exam   NAD  nonlabored breathing  No rash on legs                Procedure: Some benefits discussed and patient was consented.  Alcohol used to clean the area of the skin and ethyl chloride to anesthetize the area.  1 injected each lateral thigh.  Band aids in place.  Well tolerated.    .  ..

## 2022-07-25 ENCOUNTER — OFFICE VISIT (OUTPATIENT)
Dept: ORTHOPEDICS | Facility: CLINIC | Age: 72
End: 2022-07-25
Payer: COMMERCIAL

## 2022-07-25 VITALS — BODY MASS INDEX: 27.16 KG/M2 | WEIGHT: 169 LBS | HEIGHT: 66 IN

## 2022-07-25 DIAGNOSIS — Z98.1 S/P SPINAL FUSION: Primary | ICD-10-CM

## 2022-07-25 DIAGNOSIS — M54.50 CHRONIC LOW BACK PAIN: ICD-10-CM

## 2022-07-25 DIAGNOSIS — G89.29 CHRONIC LOW BACK PAIN: ICD-10-CM

## 2022-07-25 PROCEDURE — 99214 OFFICE O/P EST MOD 30 MIN: CPT | Mod: GC | Performed by: PHYSICIAN ASSISTANT

## 2022-07-25 ASSESSMENT — ENCOUNTER SYMPTOMS
NECK MASS: 0
HYPOTENSION: 0
HYPERTENSION: 1
SORE THROAT: 0
EXERCISE INTOLERANCE: 1
SMELL DISTURBANCE: 0
TROUBLE SWALLOWING: 1
HOT FLASHES: 0
TASTE DISTURBANCE: 0
SINUS CONGESTION: 0
SYNCOPE: 0
PALPITATIONS: 1
DECREASED LIBIDO: 1
SLEEP DISTURBANCES DUE TO BREATHING: 0
LIGHT-HEADEDNESS: 1
ORTHOPNEA: 0
SINUS PAIN: 0
HOARSE VOICE: 0
LEG PAIN: 1

## 2022-07-25 NOTE — LETTER
7/25/2022         RE: Nadira Perez  [address]  [place] [facility] [address]        Dear Colleague,    Thank you for referring your patient, Nadira Perez, to the [facility] [place]. Please see a copy of my visit note below.    Spine Surgery Return Clinic Visit    Chief Complaint:   RECHECK (Follow up after injection last month, the pain has returned. Is still having the same pain that she had prior to the surgery, she is looking to see if she can get a more focused injection since the last one was more of a bath. She stated that she is still getting muscles spasms in her lower spine, are there other alternative to surgery, )    Interval HPI:     Nadira Perez is a 72 year old female who presents today for clinic follow-up status post L5-S1 OLIF    She notes that she is experiencing back pain that worsens with activity.  Pain is worse at night with spasms to her low back.  She notes that she has radicular symptoms that present as a glove sensation in her left leg all the way down to her legs.  She notes that she has hyperesthesia to the lateral aspect of her right shin.  Stopped physical therapy due to concerns of symptoms worsening.  Wants to know how to address her pain.  Notes that her pain is believed to be at the same level as it was prior to surgical intervention.  Denies any changes to bowel or bladder function.  Notes that her pain is a 5 out of 10 today utilizing Vicodin, will be a 7 out of 10 without pain relief.           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     Thyroid disease             Past Surgical History:     Past Surgical History:   Procedure Laterality Date     ABDOMEN SURGERY      ovarian cyst, mesh     ARTHRODESIS WRIST Right      ARTHRODESIS WRIST  02/14/2013    Procedure: ARTHRODESIS WRIST;  left wrist scaphoid excision, four bone fusion, iliac crest bone graft  ( Mac with block);  Surgeon: Av Mendez MD;  [facility]     BIOPSY      skin, vaginal     BLEPHAROPLASTY BILATERAL Bilateral 5/6/2022    Procedure: UPPER BLEPHAROPLASTY, BILATERAL;  Surgeon: Jemal Sanchez MD;  Location: [facility] OR     CATARACT IOL, RT/LT Right 03/13/2018     CATARACT IOL, RT/LT Left 02/20/2018     COLONOSCOPY  12/24/2013    Procedure: COMBINED COLONOSCOPY, SINGLE BIOPSY/POLYPECTOMY BY BIOPSY;  COLONOSCOPY;  Surgeon: Dom Alvarez MD;  Location:  GI     COSMETIC BLEPHAROPLASTY LOWER LIDS BILATERAL Bilateral 5/6/2022    Procedure: BLEPHAROPLASTY, LOWER EYELID, BILATERAL, COSMETIC;  Surgeon: Jemal Sanchez MD;  Location: [facility] OR     ESOPHAGOSCOPY, GASTROSCOPY, DUODENOSCOPY (EGD), COMBINED N/A 11/23/2016    Procedure: COMBINED ESOPHAGOSCOPY, GASTROSCOPY, DUODENOSCOPY (EGD);  Surgeon: Quinten Feliciano MD;  Location:  GI     EXTERNAL EAR SURGERY      right     EYE SURGERY      radial keratomy     FUSION, SPINE, INTERBODY, OBLIQUE ANTERIOR AND LUMBAR, 2 LEVELS, POST APPROACH, USING OTS N/A 11/18/2021    Procedure: Part 1: Oblique Anterior Interbody Fusion at Lumbar 5 to sacral 1 with use of Bone Morphogenic Protein,;  Surgeon: Serge Ramirez MD;  Location: [facility]     GRAFT BONE FROM ILIAC CREST  02/14/2013    Procedure: GRAFT BONE FROM ILIAC CREST;  mac with block and local infilitration;  Surgeon: Av Mendez MD;  [facility]     HC BREATH HYDROGEN TEST N/A 10/14/2016    Procedure: HYDROGEN BREATH TEST;  Surgeon: Cheri Barron MD;  [facility]     HERNIA REPAIR       umbilical age 18 mos.     HYSTERECTOMY TOTAL ABDOMINAL  05/03/2000     MASTECTOMY MODIFIED RADICAL Bilateral     bilateral; right breast prophylactic     OPTICAL TRACKING SYSTEM FUSION POSTERIOR SPINE LUMBAR N/A 11/18/2021    Procedure: Open Posterior Instrumented Spinal Fusion at Lumbar 5 to Sacral 1, with grimm aguayo osteotomy, use of O-Arm/Stealth;  Surgeon: Serge Ramirez MD;  Location: [facility]     PARATHYROIDECTOMY  09/23/2004    R SUPERIOR     PARATHYROIDECTOMY  09/23/2004    parathyroid resection, subtotal     RELEASE CARPAL TUNNEL Right 12/2/2021    Procedure: RIGHT CARPAL TUNNEL RELEASE, RIGHT WRIST HARDWARE REMOVAL, RIGHT CARPAL BOSS EXCISION;  Surgeon: Rolan Conley MD;  [place] [facility] [place]     REMOVE HARDWARE HAND  09/24/2013    Procedure: REMOVE HARDWARE HAND;  Left Hand Screw Removal        RHINOPLASTY  1968     thyr proc skin closed cosmetic manner by subcuticular stitch  01/23/2009     THYROPLASTY  10/09/2009     TONSILLECTOMY  1977     VITRECTOMY PARSPLANA WITH 25 GAUGE SYSTEM Left 6/20/2022    Procedure: LEFT EYE VITRECTOMY, PARS PLANA APPROACH, USING 25-GAUGE INSTRUMENTS, laser;  Surgeon: Felix Carlos MD;  [place] [facility] [place]     WRIST SURGERY      wrist arthrodesis            Social History:     Social History     Tobacco Use     Smoking status: Never Smoker     Smokeless tobacco: Never Used   Substance Use Topics     Alcohol use: Not Currently     Alcohol/week: 7.0 standard drinks     Types: 7 Glasses of wine per week     Comment: Rare to occasional            Family History:     Family History   Problem Relation Age of Onset     Neurologic Disorder Mother         Anuerysm of Cerebral Artery, Dementia     Diabetes Mother      Thyroid Disease Mother         ,     Cerebrovascular Disease Mother      Dementia Mother      Osteoporosis Mother      Heart Disease Father         AAA     Hypertension Father      Circulatory Brother         Perihperal Neurophathy     Diabetes  Maternal Grandmother      Asthma Maternal Grandmother      Chronic Obstructive Pulmonary Disease Maternal Grandfather         father     Asthma Maternal Grandfather      Diabetes Maternal Aunt         x2     Melanoma Maternal Aunt      Glaucoma Maternal Aunt      Breast Cancer Cousin      Dementia Other      Cancer Other         malignant melanoma     Hypertension Other      Hypertension Other      Cerebrovascular Disease Other      Cerebrovascular Disease Other      Obesity Other      Respiratory Other      Cancer Other      Diabetes Other      Asthma Other      Macular Degeneration No family hx of      Coronary Artery Disease No family hx of      Hyperlipidemia No family hx of      Kidney Disease No family hx of      Thrombosis No family hx of      Arthritis No family hx of      Depression No family hx of      Mental Illness No family hx of      Substance Abuse No family hx of      Cystic Fibrosis No family hx of      Early Death No family hx of      Coronary Artery Disease Early Onset No family hx of      Heart Failure No family hx of      Bleeding Diathesis No family hx of      Ovarian Cancer No family hx of      Uterine Cancer No family hx of      Prostate Cancer No family hx of      Colorectal Cancer No family hx of      Pancreatic Cancer No family hx of      Lung Cancer No family hx of      Other Cancer No family hx of      Autoimmune Disease No family hx of      Unknown/Adopted No family hx of      Genetic Disorder No family hx of      Bleeding Disorder No family hx of      Clotting Disorder No family hx of      Anesthesia Reaction No family hx of             Allergies:     Allergies   Allergen Reactions     Erythromycin Nausea     Penicillins Hives     Around age 6 - doesn't recall full reaction, mother told her it was hives.     Has tolerated cephalsporins.             Medications:     Current Outpatient Medications   Medication     acetaminophen (TYLENOL) 500 MG tablet     amLODIPine (NORVASC) 10 MG  "tablet     Ascorbic Acid (VITAMIN C) 500 MG CHEW     atorvastatin (LIPITOR) 80 MG tablet     Cholecalciferol (VITAMIN D3) 50 MCG (2000 UT) CAPS     CRANBERRY EXTRACT PO     diclofenac (VOLTAREN) 1 % topical gel     gabapentin (NEURONTIN) 300 MG capsule     HYDROcodone-acetaminophen (NORCO) 5-325 MG tablet     hydrOXYzine (VISTARIL) 25 MG capsule     ibuprofen (ADVIL/MOTRIN) 600 MG tablet     levothyroxine (SYNTHROID/LEVOTHROID) 112 MCG tablet     lisinopril (ZESTRIL) 40 MG tablet     melatonin 5 MG tablet     methocarbamol (ROBAXIN) 500 MG tablet     metoprolol succinate ER (TOPROL-XL) 50 MG 24 hr tablet     Multiple Vitamin (MULTI-VITAMIN) per tablet     naloxone (NARCAN) 4 MG/0.1ML nasal spray     ondansetron (ZOFRAN-ODT) 4 MG ODT tab     senna-docusate (SENOKOT-S/PERICOLACE) 8.6-50 MG tablet     spironolactone (ALDACTONE) 25 MG tablet     triamcinolone (KENALOG) 0.1 % external ointment     Vaginal Lubricant (REPHRESH) GEL     Current Facility-Administered Medications   Medication     lidocaine 1 % injection 1 mL     lidocaine 1 % injection 1 mL     romosozumab-aqqg (EVENITY) injection 210 mg     romosozumab-aqqg (EVENITY) injection 210 mg     romosozumab-aqqg (EVENITY) injection 210 mg     triamcinolone (KENALOG-40) injection 40 mg     triamcinolone (KENALOG-40) injection 40 mg             Physical Exam:     Vitals: Ht 1.676 m (5' 6\")   Wt 76.7 kg (169 lb)   BMI 27.28 kg/m      Constitutional: Patient is healthy, well-nourished and appears stated age.    Respiratory: Patient is breathing normally and in no respiratory distress.    Skin: No suspicious rashes or lesions. Incision well-healed    Gait: Non-antalgic gait without use of assistive devices. Able to tandem gait without difficulty.     Neurologic -diminished sensation on lateral aspect of right thigh, hyperesthesia to right Shin, diminished sensation to medial and lateral aspect of left shin and thigh.     Musculoskeletal: Strength: 4+ out of 5 left hip " flexion, 5 out of 5 bilateral knee extension, dorsiflexion plantarflexion    Spine: overall good sagittal balance  o Lumbosacral Spine:  - Normal lordosis  - Non-tender to palpation, facets non-tender. SI joints non-tender.   - ROM - Full, no pain      Hips: No pain with hip log roll and no tenderness over the greater trochanters. Amalia negative.          Imaging:   We independently reviewed and interpreted the following imaging at this clinic visit which were also reviewed with patient    CT lumbar spine.  5/18/2022    L4-5 spondylolisthesis.  L1-L2 retrolisthesis.  Vacuum disc and sclerosis noted to the endplates at L2-3, L3-4 and L4-5.  Gas noted an L4 facets bilaterally.  Calcified disc fragment causing central canal stenosis at L2-3 and L3-4.    Well fused L5-S1 posterior spinal fusion well fused interbody.  No concerns for fractures to rods or screws.  No concerns for fractures to L5 vertebral body or sacrum       Assessment:     72 year old female with status post L5-S1 OLIF with worsening back pain, lumbar spondylosis, lumbar stenosis with neurogenic claudication        Plan:   Ismael was educated that she has a well fused L5-S1 segment and evaluation of her CT lumbar spine that she got in May.  No concerns for fractures at this time.  She was educated is evidence of advanced arthritis on the proximal aspect of her's fusion from L2-L5.  There is evidence of vacuum disc.  There is evidence of gas and L4 facets bilaterally.  She was educated that it is believed that surgical intervention is not warranted at this time.  We will plan on maximize conservative therapies before pursuing further surgical intervention.  She was referred to physical therapy.  Educated that she can try a TENS unit and acupuncture for muscle spasms.  Educated to reach out if she has progressive worsening ability to ambulate.  Educated to reach out if symptoms do not improve with therapy recommendations.  Last injection was not beneficial  and she denied any lasting pain relief.     -Referral to physical therapy  -Acupuncture      I, Serge Ramirez MD, saw and evaluated Nadira Perez  1950.  I have reviewed and discussed with the advanced practice provider their history, physical and plan.    I have personally reviewed the imaging, history, and physical exam.    I personally provided substantive care for this patient, including personally interpreting the imaging.  In addition, I formulated the entire plan noted above and dictated this to the PA/APRN so they could document it in the note. The TOBI is acting as a Scribe for this note. The plan represents my own Medical Decision making, which I determined in its entirety.      MD Bishop ERUM Persaud, PA-C   Orthopedic Spine Surgery

## 2022-07-25 NOTE — PROGRESS NOTES
Spine Surgery Return Clinic Visit    Chief Complaint:   RECHECK (Follow up after injection last month, the pain has returned. Is still having the same pain that she had prior to the surgery, she is looking to see if she can get a more focused injection since the last one was more of a bath. She stated that she is still getting muscles spasms in her lower spine, are there other alternative to surgery, )    Interval HPI:     Nadira Perez is a 72 year old female who presents today for clinic follow-up status post L5-S1 OLIF    She notes that she is experiencing back pain that worsens with activity.  Pain is worse at night with spasms to her low back.  She notes that she has radicular symptoms that present as a glove sensation in her left leg all the way down to her legs.  She notes that she has hyperesthesia to the lateral aspect of her right shin.  Stopped physical therapy due to concerns of symptoms worsening.  Wants to know how to address her pain.  Notes that her pain is believed to be at the same level as it was prior to surgical intervention.  Denies any changes to bowel or bladder function.  Notes that her pain is a 5 out of 10 today utilizing Vicodin, will be a 7 out of 10 without pain relief.           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     Thyroid disease             Past Surgical History:     Past Surgical History:   Procedure Laterality Date     ABDOMEN SURGERY      ovarian cyst, mesh     ARTHRODESIS WRIST Right      ARTHRODESIS WRIST  02/14/2013     Procedure: ARTHRODESIS WRIST;  left wrist scaphoid excision, four bone fusion, iliac crest bone graft  ( Mac with block);  Surgeon: Av Mendez MD;  [facility]     BIOPSY      skin, vaginal     BLEPHAROPLASTY BILATERAL Bilateral 5/6/2022    Procedure: UPPER BLEPHAROPLASTY, BILATERAL;  Surgeon: Jemal Sanchez MD;  Location: [facility] OR     CATARACT IOL, RT/LT Right 03/13/2018     CATARACT IOL, RT/LT Left 02/20/2018     COLONOSCOPY  12/24/2013    Procedure: COMBINED COLONOSCOPY, SINGLE BIOPSY/POLYPECTOMY BY BIOPSY;  COLONOSCOPY;  Surgeon: Dom Alvarez MD;  Location:  GI     COSMETIC BLEPHAROPLASTY LOWER LIDS BILATERAL Bilateral 5/6/2022    Procedure: BLEPHAROPLASTY, LOWER EYELID, BILATERAL, COSMETIC;  Surgeon: Jemal Sanchez MD;  Location: [facility] [place]     ESOPHAGOSCOPY, GASTROSCOPY, DUODENOSCOPY (EGD), COMBINED N/A 11/23/2016    Procedure: COMBINED ESOPHAGOSCOPY, GASTROSCOPY, DUODENOSCOPY (EGD);  Surgeon: Quinten Feliciano MD;  Location:  GI     EXTERNAL EAR SURGERY      right     EYE SURGERY      radial keratomy     FUSION, SPINE, INTERBODY, OBLIQUE ANTERIOR AND LUMBAR, 2 LEVELS, POST APPROACH, USING OTS N/A 11/18/2021    Procedure: Part 1: Oblique Anterior Interbody Fusion at Lumbar 5 to sacral 1 with use of Bone Morphogenic Protein,;  Surgeon: Serge Ramirez MD;  Location:  OR     GRAFT BONE FROM ILIAC CREST  02/14/2013    Procedure: GRAFT BONE FROM ILIAC CREST;  mac with block and local infilitration;  Surgeon: Av Mendez MD;  [facility]     HC BREATH HYDROGEN TEST N/A 10/14/2016    Procedure: HYDROGEN BREATH TEST;  Surgeon: Cheri Barron MD;  Location:  GI     HERNIA REPAIR      umbilical age 18 mos.     HYSTERECTOMY TOTAL ABDOMINAL  05/03/2000     MASTECTOMY MODIFIED RADICAL Bilateral     bilateral; right breast prophylactic     OPTICAL TRACKING SYSTEM FUSION POSTERIOR SPINE LUMBAR N/A 11/18/2021    Procedure: Open Posterior Instrumented  Spinal Fusion at Lumbar 5 to Sacral 1, with grimm aguayo osteotomy, use of O-Arm/Stealth;  Surgeon: Serge Ramirez MD;  Location: [facility]     PARATHYROIDECTOMY  09/23/2004    R SUPERIOR     PARATHYROIDECTOMY  09/23/2004    parathyroid resection, subtotal     RELEASE CARPAL TUNNEL Right 12/2/2021    Procedure: RIGHT CARPAL TUNNEL RELEASE, RIGHT WRIST HARDWARE REMOVAL, RIGHT CARPAL BOSS EXCISION;  Surgeon: Rolan Conley MD;  Location: [facility] OR     REMOVE HARDWARE HAND  09/24/2013    Procedure: REMOVE HARDWARE HAND;  Left Hand Screw Removal        RHINOPLASTY  1968     thyr proc skin closed cosmetic manner by subcuticular stitch  01/23/2009     THYROPLASTY  10/09/2009     TONSILLECTOMY  1977     VITRECTOMY PARSPLANA WITH 25 GAUGE SYSTEM Left 6/20/2022    Procedure: LEFT EYE VITRECTOMY, PARS PLANA APPROACH, USING 25-GAUGE INSTRUMENTS, laser;  Surgeon: Felix Carlos MD;  Location: [facility] OR     WRIST SURGERY      wrist arthrodesis            Social History:     Social History     Tobacco Use     Smoking status: Never Smoker     Smokeless tobacco: Never Used   Substance Use Topics     Alcohol use: Not Currently     Alcohol/week: 7.0 standard drinks     Types: 7 Glasses of wine per week     Comment: Rare to occasional            Family History:     Family History   Problem Relation Age of Onset     Neurologic Disorder Mother         Anuerysm of Cerebral Artery, Dementia     Diabetes Mother      Thyroid Disease Mother         ,     Cerebrovascular Disease Mother      Dementia Mother      Osteoporosis Mother      Heart Disease Father         AAA     Hypertension Father      Circulatory Brother         Perihperal Neurophathy     Diabetes Maternal Grandmother      Asthma Maternal Grandmother      Chronic Obstructive Pulmonary Disease Maternal Grandfather         father     Asthma Maternal Grandfather      Diabetes Maternal Aunt         x2     Melanoma Maternal Aunt      Glaucoma Maternal Aunt      Breast  Cancer Cousin      Dementia Other      Cancer Other         malignant melanoma     Hypertension Other      Hypertension Other      Cerebrovascular Disease Other      Cerebrovascular Disease Other      Obesity Other      Respiratory Other      Cancer Other      Diabetes Other      Asthma Other      Macular Degeneration No family hx of      Coronary Artery Disease No family hx of      Hyperlipidemia No family hx of      Kidney Disease No family hx of      Thrombosis No family hx of      Arthritis No family hx of      Depression No family hx of      Mental Illness No family hx of      Substance Abuse No family hx of      Cystic Fibrosis No family hx of      Early Death No family hx of      Coronary Artery Disease Early Onset No family hx of      Heart Failure No family hx of      Bleeding Diathesis No family hx of      Ovarian Cancer No family hx of      Uterine Cancer No family hx of      Prostate Cancer No family hx of      Colorectal Cancer No family hx of      Pancreatic Cancer No family hx of      Lung Cancer No family hx of      Other Cancer No family hx of      Autoimmune Disease No family hx of      Unknown/Adopted No family hx of      Genetic Disorder No family hx of      Bleeding Disorder No family hx of      Clotting Disorder No family hx of      Anesthesia Reaction No family hx of             Allergies:     Allergies   Allergen Reactions     Erythromycin Nausea     Penicillins Hives     Around age 6 - doesn't recall full reaction, mother told her it was hives.     Has tolerated cephalsporins.             Medications:     Current Outpatient Medications   Medication     acetaminophen (TYLENOL) 500 MG tablet     amLODIPine (NORVASC) 10 MG tablet     Ascorbic Acid (VITAMIN C) 500 MG CHEW     atorvastatin (LIPITOR) 80 MG tablet     Cholecalciferol (VITAMIN D3) 50 MCG (2000 UT) CAPS     CRANBERRY EXTRACT PO     diclofenac (VOLTAREN) 1 % topical gel     gabapentin (NEURONTIN) 300 MG capsule      "HYDROcodone-acetaminophen (NORCO) 5-325 MG tablet     hydrOXYzine (VISTARIL) 25 MG capsule     ibuprofen (ADVIL/MOTRIN) 600 MG tablet     levothyroxine (SYNTHROID/LEVOTHROID) 112 MCG tablet     lisinopril (ZESTRIL) 40 MG tablet     melatonin 5 MG tablet     methocarbamol (ROBAXIN) 500 MG tablet     metoprolol succinate ER (TOPROL-XL) 50 MG 24 hr tablet     Multiple Vitamin (MULTI-VITAMIN) per tablet     naloxone (NARCAN) 4 MG/0.1ML nasal spray     ondansetron (ZOFRAN-ODT) 4 MG ODT tab     senna-docusate (SENOKOT-S/PERICOLACE) 8.6-50 MG tablet     spironolactone (ALDACTONE) 25 MG tablet     triamcinolone (KENALOG) 0.1 % external ointment     Vaginal Lubricant (REPHRESH) GEL     Current Facility-Administered Medications   Medication     lidocaine 1 % injection 1 mL     lidocaine 1 % injection 1 mL     romosozumab-aqqg (EVENITY) injection 210 mg     romosozumab-aqqg (EVENITY) injection 210 mg     romosozumab-aqqg (EVENITY) injection 210 mg     triamcinolone (KENALOG-40) injection 40 mg     triamcinolone (KENALOG-40) injection 40 mg             Physical Exam:     Vitals: Ht 1.676 m (5' 6\")   Wt 76.7 kg (169 lb)   BMI 27.28 kg/m      Constitutional: Patient is healthy, well-nourished and appears stated age.    Respiratory: Patient is breathing normally and in no respiratory distress.    Skin: No suspicious rashes or lesions. Incision well-healed    Gait: Non-antalgic gait without use of assistive devices. Able to tandem gait without difficulty.     Neurologic -diminished sensation on lateral aspect of right thigh, hyperesthesia to right Shin, diminished sensation to medial and lateral aspect of left shin and thigh.     Musculoskeletal: Strength: 4+ out of 5 left hip flexion, 5 out of 5 bilateral knee extension, dorsiflexion plantarflexion    Spine: overall good sagittal balance  o Lumbosacral Spine:  - Normal lordosis  - Non-tender to palpation, facets non-tender. SI joints non-tender.   - ROM - Full, no pain "      Hips: No pain with hip log roll and no tenderness over the greater trochanters. Amalia negative.          Imaging:   We independently reviewed and interpreted the following imaging at this clinic visit which were also reviewed with patient    CT lumbar spine.  5/18/2022    L4-5 spondylolisthesis.  L1-L2 retrolisthesis.  Vacuum disc and sclerosis noted to the endplates at L2-3, L3-4 and L4-5.  Gas noted an L4 facets bilaterally.  Calcified disc fragment causing central canal stenosis at L2-3 and L3-4.    Well fused L5-S1 posterior spinal fusion well fused interbody.  No concerns for fractures to rods or screws.  No concerns for fractures to L5 vertebral body or sacrum       Assessment:     72 year old female with status post L5-S1 OLIF with worsening back pain, lumbar spondylosis, lumbar stenosis with neurogenic claudication        Plan:   Ismael was educated that she has a well fused L5-S1 segment and evaluation of her CT lumbar spine that she got in May.  No concerns for fractures at this time.  She was educated is evidence of advanced arthritis on the proximal aspect of her's fusion from L2-L5.  There is evidence of vacuum disc.  There is evidence of gas and L4 facets bilaterally.  She was educated that it is believed that surgical intervention is not warranted at this time.  We will plan on maximize conservative therapies before pursuing further surgical intervention.  She was referred to physical therapy.  Educated that she can try a TENS unit and acupuncture for muscle spasms.  Educated to reach out if she has progressive worsening ability to ambulate.  Educated to reach out if symptoms do not improve with therapy recommendations.  Last injection was not beneficial and she denied any lasting pain relief.     -Referral to physical therapy  -Acupuncture      I, Serge Ramirez MD, saw and evaluated Nadira Perez  1950.  I have reviewed and discussed with the advanced practice provider their  history, physical and plan.    I have personally reviewed the imaging, history, and physical exam.    I personally provided substantive care for this patient, including personally interpreting the imaging.  In addition, I formulated the entire plan noted above and dictated this to the PA/APRN so they could document it in the note. The TOBI is acting as a Scribe for this note. The plan represents my own Medical Decision making, which I determined in its entirety.      MD Bishop ERUM Persaud, PA-C   Orthopedic Spine Surgery

## 2022-07-29 DIAGNOSIS — I10 ESSENTIAL HYPERTENSION, BENIGN: ICD-10-CM

## 2022-07-29 RX ORDER — SPIRONOLACTONE 25 MG/1
50 TABLET ORAL DAILY
Qty: 180 TABLET | Refills: 3 | Status: SHIPPED | OUTPATIENT
Start: 2022-07-29 | End: 2022-10-27

## 2022-07-29 NOTE — TELEPHONE ENCOUNTER
Last Clinic Visit: 5/3/2022  [facility] [place]  BP elevated, was during eye procedure  BP Readings from Last 3 Encounters:   06/20/22 (!) 136/90   06/14/22 134/84   06/10/22 116/83

## 2022-08-08 ENCOUNTER — THERAPY VISIT (OUTPATIENT)
Dept: PHYSICAL THERAPY | Facility: CLINIC | Age: 72
End: 2022-08-08
Attending: PHYSICIAN ASSISTANT
Payer: COMMERCIAL

## 2022-08-08 DIAGNOSIS — Z98.1 S/P SPINAL FUSION: ICD-10-CM

## 2022-08-08 PROCEDURE — 97161 PT EVAL LOW COMPLEX 20 MIN: CPT | Mod: GP | Performed by: PHYSICAL THERAPIST

## 2022-08-08 PROCEDURE — 97110 THERAPEUTIC EXERCISES: CPT | Mod: GP | Performed by: PHYSICAL THERAPIST

## 2022-08-08 PROCEDURE — 97112 NEUROMUSCULAR REEDUCATION: CPT | Mod: GP | Performed by: PHYSICAL THERAPIST

## 2022-08-08 NOTE — PROGRESS NOTES
Physical Therapy Initial Evaluation  Subjective:  The history is provided by the patient. No  was used.   Patient Health History  Nadira Perez being seen for PT eval to restart PT.     Problem began: 11/18/2021.   Problem occurred: Long-term osteoporosis with balance issues   Pain is reported as 3/10 on pain scale.  General health as reported by patient is fair.  Pertinent medical history includes: anemia, changes in bowel/bladder, cancer, concussions/dizziness, fibromyalgia, history of fractures, heart problems, hepatitis, high blood pressure, menopausal, numbness/tingling, overweight, osteoarthritis, osteoporosis, pain at night/rest, thyroid problems and weakness.     Medical allergies: none.   Surgeries include:  Orthopedic surgery, cancer surgery and other. Other surgery history details: See chart.    Current medications:  Anti-inflammatory, bone density, high blood pressure medication, muscle relaxants, pain medication, sleep medication, thyroid medication and other. Other medications details: See chart.    Current occupation is Semi-retired RN.   Primary job tasks include:  Computer work, driving and prolonged sitting.                  Therapist Generated HPI Evaluation  Problem details: Pt c/o LBP and balance issues following L5-S1 fusion 11/18/2021.  Notes spasms entire Lx spine and sharper pains in varying areas in L spine.   Pt states she fell 1 month out from surgery injuring back and L ankle.  This delayed start of initial PT.   Started PT 5/4/2022 and then fell later that day at [employer] resulting in R foot/ankle, B finger fracture (x5).  This lead to break in PT.  Do to increased pain she had an epidural 6/2022 with little relief (1 day).  To start acupuncture end of month.  Wears Lx brace/support intermittently.  Since surgery uses SEC/walker..         Type of problem:  Lumbar.    This is a chronic condition.  Condition occurred with:  Insidious onset.  Where condition  occurred: for unknown reasons.  Patient reports pain:  Lower lumbar spine, central lumbar spine, lumbar spine left, upper lumbar spine, mid lumbar spine and lumbar spine right.  Pain is described as aching, burning, sharp, shooting and stabbing and is constant.  Pain radiates to:  Thigh left, gluteals left, knee left, lower leg left, gluteals right, knee right, thigh right and lower leg right (L>R and longer standing ). Pain is the same all the time.  Since onset symptoms are unchanged.  Associated symptoms:  Tingling and numbness (R>L lower leg). Symptoms are exacerbated by bending, lifting, lying down, standing, twisting and walking  and relieved by rest, analgesics, NSAID's and heat.      Barriers include:  None as reported by patient.                        Objective:  Standing Alignment:    Cervical/Thoracic:  Convex thoracic scoliosis L    Lumbar:  Convex scoliosis R            Gait:    Gait Type:  Antalgic   Assistive Devices:  Cane and walker                 Lumbar/SI Evaluation  ROM:    AROM Lumbar:   Flexion:          Min/mod loss +/-  Ext:                    Max loss ++   Side Bend:        Left:  Min loss +/-    Right:  Min/mod loss +/-  Rotation:           Left:     Right:   Side Glide:        Left:     Right:         Strength: Fair/poor core stab recruitment, abdominal strength 3+/5 (R hernia noted).  Glute med B 3+/5, max 4-/5        Lumbar Dermtomes:  Lumbar dermatomes: decreased B Lower leg from neuropathy.                                                                       General     ROS    Assessment/Plan:    Patient is a 72 year old female with lumbar complaints.    Patient has the following significant findings with corresponding treatment plan.                Diagnosis 1:  S/P L5-S1 fusion  Pain -  hot/cold therapy, electric stimulation, manual therapy, directional preference exercise and home program  Decreased ROM/flexibility - manual therapy and therapeutic exercise  Decreased strength -  therapeutic exercise and therapeutic activities  Decreased proprioception - neuro re-education and therapeutic activities  Impaired gait - gait training and assistive devices  Impaired muscle performance - neuro re-education  Decreased function - therapeutic activities  Impaired posture - neuro re-education    Therapy Evaluation Codes:   Cumulative Therapy Evaluation is: Low complexity.    Previous and current functional limitations:  (See Goal Flow Sheet for this information)    Short term and Long term goals: (See Goal Flow Sheet for this information)     Communication ability:  Patient appears to be able to clearly communicate and understand verbal and written communication and follow directions correctly.  Treatment Explanation - The following has been discussed with the patient:   RX ordered/plan of care  Anticipated outcomes  Possible risks and side effects  This patient would benefit from PT intervention to resume normal activities.   Rehab potential is good.    Frequency:  1 X week, once daily  Duration:  for 12 weeks  Discharge Plan:  Achieve all LTG.  Independent in home treatment program.  Reach maximal therapeutic benefit.    Please refer to the daily flowsheet for treatment today, total treatment time and time spent performing 1:1 timed codes.        6

## 2022-08-08 NOTE — PROGRESS NOTES
[facility]    OUTPATIENT Physical Therapy ORTHOPEDIC EVALUATION  PLAN OF TREATMENT FOR OUTPATIENT REHABILITATION  (COMPLETE FOR INITIAL CLAIMS ONLY)  Patient's Last Name, First Name, M.I.  YOB: 1950  Nadira Perez    Provider s Name:  [facility]   Medical Record No.  [id number]   Start of Care Date:  08/08/22   Onset Date:  11/18/21    Type:     _X__PT   ___OT Medical Diagnosis:    Encounter Diagnosis   Name Primary?    S/P spinal fusion         Treatment Diagnosis:  S/P L5-S1 fusion        Goals:     08/08/22 0500   Body Part   Goals listed below are for S/P L5-S1 fusion   Goal #2   Goal #2 standing   Previous Functional Level No restrictions   Current Functional Level Minutes patient can stand   Performance level 10 with 5/10 pain   STG [employer] Performance Minutes patient will be able to stand   Performance level 10 with 3/10 pain   Rationale for housekeeping tasks such as vacuuming, bed making, mowing, gardening;for personal hygiene;for meal preparation;for safe household ambulation;for safe community ambulation   Due date 08/29/22   LTG [employer] Performance Minutes patient will be able to stand   Performance Level 30+ pain free   Rationale for housekeeping tasks such as vacuuming, bed making, mowing;for personal hygiene;for meal preparation;for safe household ambulation;for safe community ambulation   Due date 10/31/22       Therapy Frequency:  1x/week  Predicted Duration of Therapy Intervention:  12 weeks    Chaitanya Villasenor, PT                 I CERTIFY THE NEED FOR THESE SERVICES FURNISHED UNDER        THIS PLAN OF TREATMENT AND WHILE UNDER MY CARE     (Physician attestation of this document indicates review and certification of the therapy plan).                     Certification Date From:  08/08/22   Certification Date To:  11/05/22    Referring  Provider:  Bishop KIERRA Merida    Initial Assessment        See Epic Evaluation SOC Date: 08/08/22

## 2022-08-09 ENCOUNTER — OFFICE VISIT (OUTPATIENT)
Dept: ORTHOPEDICS | Facility: CLINIC | Age: 72
End: 2022-08-09
Payer: OTHER MISCELLANEOUS

## 2022-08-09 VITALS — BODY MASS INDEX: 27.16 KG/M2 | WEIGHT: 169 LBS | HEIGHT: 66 IN

## 2022-08-09 DIAGNOSIS — M65.342 TRIGGER FINGER, LEFT RING FINGER: ICD-10-CM

## 2022-08-09 DIAGNOSIS — M19.032 ARTHRITIS OF BOTH WRISTS: Primary | ICD-10-CM

## 2022-08-09 DIAGNOSIS — M19.031 ARTHRITIS OF BOTH WRISTS: Primary | ICD-10-CM

## 2022-08-09 PROCEDURE — 99214 OFFICE O/P EST MOD 30 MIN: CPT | Mod: 25 | Performed by: STUDENT IN AN ORGANIZED HEALTH CARE EDUCATION/TRAINING PROGRAM

## 2022-08-09 PROCEDURE — 20605 DRAIN/INJ JOINT/BURSA W/O US: CPT | Mod: 50 | Performed by: STUDENT IN AN ORGANIZED HEALTH CARE EDUCATION/TRAINING PROGRAM

## 2022-08-09 PROCEDURE — 20550 NJX 1 TENDON SHEATH/LIGAMENT: CPT | Mod: F2 | Performed by: STUDENT IN AN ORGANIZED HEALTH CARE EDUCATION/TRAINING PROGRAM

## 2022-08-09 RX ORDER — LIDOCAINE HYDROCHLORIDE 10 MG/ML
1 INJECTION, SOLUTION INFILTRATION; PERINEURAL
Status: DISCONTINUED | OUTPATIENT
Start: 2022-08-09 | End: 2023-09-12

## 2022-08-09 RX ORDER — TRIAMCINOLONE ACETONIDE 40 MG/ML
40 INJECTION, SUSPENSION INTRA-ARTICULAR; INTRAMUSCULAR
Status: DISCONTINUED | OUTPATIENT
Start: 2022-08-09 | End: 2023-04-28

## 2022-08-09 RX ORDER — TESTOSTERONE CYPIONATE 200 MG/ML
1 INJECTION INTRAMUSCULAR
Status: DISCONTINUED | OUTPATIENT
Start: 2022-08-09 | End: 2024-02-17

## 2022-08-09 RX ADMIN — LIDOCAINE HYDROCHLORIDE 1 ML: 10 INJECTION, SOLUTION INFILTRATION; PERINEURAL at 12:17

## 2022-08-09 RX ADMIN — TRIAMCINOLONE ACETONIDE 40 MG: 40 INJECTION, SUSPENSION INTRA-ARTICULAR; INTRAMUSCULAR at 12:20

## 2022-08-09 RX ADMIN — TESTOSTERONE CYPIONATE 1 ML: 200 INJECTION INTRAMUSCULAR at 12:17

## 2022-08-09 RX ADMIN — LIDOCAINE HYDROCHLORIDE 1 ML: 10 INJECTION, SOLUTION INFILTRATION; PERINEURAL at 12:20

## 2022-08-09 ASSESSMENT — PAIN SCALES - GENERAL: PAINLEVEL: MODERATE PAIN (4)

## 2022-08-09 NOTE — LETTER
"    2022         RE: Nadira Perez  [address]  [facility] [address]        Dear Colleague,    Thank you for referring your patient, Nadira Perez, to the [facility] [place]. Please see a copy of my visit note below.    Orthopaedic Surgery Hand Clinic Progress Note    Patient Name: Nadira Perez  MRN: [id number]  : 1950  Date: Aug 9, 2022    Date of Surgery: 21    Surgery Performed: 1) Open right carpal tunnel release  2) Removal of buried implant (deep) right wrist    Subjective:    22: Patient was last seen 5/3/22 at which time I provided her with bilateral radiocarpal injections and new braces. She notes since last visit she sustained a fall around , and sustained nondisplaced fractures to left ring finger and right thumb for which she was treated by Dr. Miguel. She has healed fine from those, states thumb IP still a bit stiff. She has continued to have bilateral wrist pain and notes new onset of \"spasms\" in left hand with gripping/ grasping objections within the last 2-3 weeks. She states that her left small and ring fingers seem to lock and spasm with /finger flexion. She has to forcibly extend it sometimes to make them straight with pain. She states this must have happened 6-7 times over the last 2 months.      Objective:  There were no vitals taken for this visit.    General: alert, appropriate, NAD  HEENT: NC/AT  CV: RRR by pulse  Pulm: Unlabored on RA  MSK:  RUE Volar and dorsal incisions c/d/i, no e/o infection  No tenderness of the right thumb  Moderate pain to palpation dorsal central wrists bilaterally  No tenderness over left ring finger, full flexion/extension, able to make full fist, no malrotation or scissoring  Mild tenderness of the A1 pulley of the ring finger, some tenderness middle finger A1 pulley but less so than ring. Mild crepitus, no nodule, excellent finger ROM, no visible triggering  There is dynamic dorsal " distal ulna subluxation with pronation that seems to be more prominent that previous  ROM baseline  Intact EPL/FPL/APB/HI  Diminished sensation median nerve distribution baseline  WWP CR< 2s    Imaging:  None new. XRs of left ring finger and right hand from 5/4/2022 and 5/31/2022 reviewed.  These demonstrate nondisplaced fractures of the right thumb distal phalanx and left ring distal phalanx that have healed.  Unchanged alignment and appearance of 4 corner fusion on the left with ulnar subluxation of the carpus.  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 Right s/p hardware removal for second failed 4 corner fusion, and carpal tunnel release.    Unclear etiology of the left finger spasms. Possibly trigger finger but fingers are quite mobile and supple today without any pain with passive ROM. I therefore recommended a steroid injection of the left ring finger which would be both diagnostic and therapeutic.    After obtaining written consent, I cleansed the skin over the A1 pulley of the left ring finger with chlorhexidine.  I then anesthetized skin with ethyl chloride free spray, after which I injected 1 cc of dexamethasone 4 mg/mL and 1 cc of 1% lidocaine into the A1 pulley and flexor tendon sheath of the left ring finger    Patient is now able to get a repeat bilateral radiocarpal injections as it has been over 3 months since her last check she has.    After obtaining written consent, I cleansed the skin over bilateral wrist joints with chlorhexidine.  I then anesthetized the skin with ethyl chloride freeze spray spray, after which I injected 1 cc of Kenalog 40 mg and 1 cc of 1% lidocaine into bilateral radiocarpal joint through the 3-4 interval.  The patient  tolerated the procedures well and there were no complications.  She noted immediate improvement in her wrist pain.    Continue brace wear.  We will plan for wrist injections 3 times a year instead of 2 times a year for symptomatic control, until she decides that she wants to proceed with total wrist fusion on the right.    Follow-up as needed.    Rolan Conley MD    Hand, Upper Extremity & Microvascular Surgery  Department of Orthopedic Surgery  [facility]    Hand / Upper Extremity Injection/Arthrocentesis: L ring A1    Date/Time: 8/9/2022 12:17 PM  Performed by: Rolan Conley MD  Authorized by: Rolan Conley MD     Indications:  Pain  Guidance: landmark    Condition: trigger finger    Location:  Ring finger    Site:  L ring A1  Medications:  1 mL dexamethasone 120 MG/30ML; 1 mL lidocaine 1 %  Outcome:  Tolerated well, no immediate complications  Procedure discussed: discussed risks, benefits, and alternatives    Consent Given by:  Patient  Timeout: timeout called immediately prior to procedure    Prep: patient was prepped and draped in usual sterile fashion    Medium Joint Injection/Arthrocentesis: bilateral radiocarpal    Date/Time: 8/9/2022 12:20 PM  Performed by: Rolan Conley MD  Authorized by: Rolan Conley MD     Indications:  Pain  Needle Size:  25 G  Guidance: surface landmarks    Location:  Wrist  Laterality:  Bilateral  Site:  Bilateral radiocarpal  Medications (Right):  1 mL lidocaine 1 %; 40 mg triamcinolone 40 MG/ML  Medications (Left):  1 mL lidocaine 1 %; 40 mg triamcinolone 40 MG/ML  Outcome:  Tolerated well, no immediate complications  Procedure discussed: discussed risks, benefits, and alternatives    Consent Given by:  Patient  Timeout: timeout called immediately prior to procedure    Prep: patient was prepped and draped in usual sterile fashion              Again, thank you for allowing me to participate in the care of your patient.        Sincerely,        Rolan Conley  MD

## 2022-08-09 NOTE — PROGRESS NOTES
"Orthopaedic Surgery Hand Clinic Progress Note    Patient Name: Nadira Perez  MRN: [id number]  : 1950  Date: Aug 9, 2022    Date of Surgery: 21    Surgery Performed: 1) Open right carpal tunnel release  2) Removal of buried implant (deep) right wrist    Subjective:    22: Patient was last seen 5/3/22 at which time I provided her with bilateral radiocarpal injections and new braces. She notes since last visit she sustained a fall around , and sustained nondisplaced fractures to left ring finger and right thumb for which she was treated by Dr. Miguel. She has healed fine from those, states thumb IP still a bit stiff. She has continued to have bilateral wrist pain and notes new onset of \"spasms\" in left hand with gripping/ grasping objections within the last 2-3 weeks. She states that her left small and ring fingers seem to lock and spasm with /finger flexion. She has to forcibly extend it sometimes to make them straight with pain. She states this must have happened 6-7 times over the last 2 months.      Objective:  There were no vitals taken for this visit.    General: alert, appropriate, NAD  HEENT: NC/AT  CV: RRR by pulse  Pulm: Unlabored on RA  MSK:  RUE Volar and dorsal incisions c/d/i, no e/o infection  No tenderness of the right thumb  Moderate pain to palpation dorsal central wrists bilaterally  No tenderness over left ring finger, full flexion/extension, able to make full fist, no malrotation or scissoring  Mild tenderness of the A1 pulley of the ring finger, some tenderness middle finger A1 pulley but less so than ring. Mild crepitus, no nodule, excellent finger ROM, no visible triggering  There is dynamic dorsal distal ulna subluxation with pronation that seems to be more prominent that previous  ROM baseline  Intact EPL/FPL/APB/HI  Diminished sensation median nerve distribution baseline  WWP CR< 2s    Imaging:  None new. XRs of left ring finger and right hand from 2022 and " 5/31/2022 reviewed.  These demonstrate nondisplaced fractures of the right thumb distal phalanx and left ring distal phalanx that have healed.  Unchanged alignment and appearance of 4 corner fusion on the left with ulnar subluxation of the carpus.  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 Right s/p hardware removal for second failed 4 corner fusion, and carpal tunnel release.    Unclear etiology of the left finger spasms. Possibly trigger finger but fingers are quite mobile and supple today without any pain with passive ROM. I therefore recommended a steroid injection of the left ring finger which would be both diagnostic and therapeutic.    After obtaining written consent, I cleansed the skin over the A1 pulley of the left ring finger with chlorhexidine.  I then anesthetized skin with ethyl chloride free spray, after which I injected 1 cc of dexamethasone 4 mg/mL and 1 cc of 1% lidocaine into the A1 pulley and flexor tendon sheath of the left ring finger    Patient is now able to get a repeat bilateral radiocarpal injections as it has been over 3 months since her last check she has.    After obtaining written consent, I cleansed the skin over bilateral wrist joints with chlorhexidine.  I then anesthetized the skin with ethyl chloride freeze spray spray, after which I injected 1 cc of Kenalog 40 mg and 1 cc of 1% lidocaine into bilateral radiocarpal joint through the 3-4 interval.  The patient tolerated the procedures well and there were no complications.  She noted immediate improvement in her wrist pain.    Continue brace wear.  We will plan for wrist injections 3 times a year instead of 2 times a year for symptomatic control, until she decides that she  wants to proceed with total wrist fusion on the right.    Follow-up as needed.    Rolan Conley MD    Hand, Upper Extremity & Microvascular Surgery  Department of Orthopedic Surgery  [facility]    Hand / Upper Extremity Injection/Arthrocentesis: L ring A1    Date/Time: 8/9/2022 12:17 PM  Performed by: Rolan Conley MD  Authorized by: Rolan Conley MD     Indications:  Pain  Guidance: landmark    Condition: trigger finger    Location:  Ring finger    Site:  L ring A1  Medications:  1 mL dexamethasone 120 MG/30ML; 1 mL lidocaine 1 %  Outcome:  Tolerated well, no immediate complications  Procedure discussed: discussed risks, benefits, and alternatives    Consent Given by:  Patient  Timeout: timeout called immediately prior to procedure    Prep: patient was prepped and draped in usual sterile fashion    Medium Joint Injection/Arthrocentesis: bilateral radiocarpal    Date/Time: 8/9/2022 12:20 PM  Performed by: Rolan Conley MD  Authorized by: Rolan Conley MD     Indications:  Pain  Needle Size:  25 G  Guidance: surface landmarks    Location:  Wrist  Laterality:  Bilateral  Site:  Bilateral radiocarpal  Medications (Right):  1 mL lidocaine 1 %; 40 mg triamcinolone 40 MG/ML  Medications (Left):  1 mL lidocaine 1 %; 40 mg triamcinolone 40 MG/ML  Outcome:  Tolerated well, no immediate complications  Procedure discussed: discussed risks, benefits, and alternatives    Consent Given by:  Patient  Timeout: timeout called immediately prior to procedure    Prep: patient was prepped and draped in usual sterile fashion

## 2022-08-10 ENCOUNTER — OFFICE VISIT (OUTPATIENT)
Dept: ORTHOPEDICS | Facility: CLINIC | Age: 72
End: 2022-08-10
Payer: COMMERCIAL

## 2022-08-10 DIAGNOSIS — M81.0 OSTEOPOROSIS, UNSPECIFIED OSTEOPOROSIS TYPE, UNSPECIFIED PATHOLOGICAL FRACTURE PRESENCE: ICD-10-CM

## 2022-08-10 DIAGNOSIS — M80.08XG PATHOLOGICAL FRACTURE OF VERTEBRA DUE TO AGE-RELATED OSTEOPOROSIS WITH DELAYED HEALING, SUBSEQUENT ENCOUNTER: Primary | ICD-10-CM

## 2022-08-10 PROCEDURE — 96372 THER/PROPH/DIAG INJ SC/IM: CPT | Mod: 50 | Performed by: FAMILY MEDICINE

## 2022-08-10 PROCEDURE — 99207 PR DROP WITH A PROCEDURE: CPT | Performed by: FAMILY MEDICINE

## 2022-08-10 NOTE — PROGRESS NOTES
Assessment & Plan     Pathological fracture of vertebra due to age-related osteoporosis with delayed healing, subsequent encounter  Evenity 4  - romosozumab-aqqg (EVENITY) injection 210 mg    Osteoporosis, unspecified osteoporosis type, unspecified pathological fracture presence    - romosozumab-aqqg (EVENITY) injection 210 mg    Prescription drug management             Return in about 4 weeks (around 9/7/2022), or if symptoms worsen or fail to improve.    Cortney Clark MD  [facility] THIERRY Guevara is a 72 year old accompanied by her self, presenting for the following health issues:  Follow Up ([facility] 4/12)      HPI     Patient is a 72-year-old white female here for her fifth Evenity injection.  Patient reports that she does not get headaches and has tolerated the medications okay.  She is interested in knowing why Evenity had black box warnings of MI and stroke.  Patient has never had these diagnoses in her past medical history.    Review of Systems   Constitutional, HEENT, cardiovascular, pulmonary, gi and gu systems are negative, except as otherwise noted.      Objective    There were no vitals taken for this visit.  There is no height or weight on file to calculate BMI.  Physical Exam   nonlabored breathing  NAD                Procedure: Some benefits discussed and patient was consented.  Alcohol used to clean the area of the skin and ethyl chloride to anesthetize the area.  1 injected each lateral thigh.  Band aids in place.  Well tolerated.    .  ..

## 2022-08-10 NOTE — LETTER
8/10/2022      RE: Nadira Perez  [address]  [facility] [address]     Dear Colleague,    Thank you for referring your patient, Nadira Perez, to the [facility]. Please see a copy of my visit note below.      Assessment & Plan     Pathological fracture of vertebra due to age-related osteoporosis with delayed healing, subsequent encounter  Evenity 4  - romosozumab-aqqg (EVENITY) injection 210 mg    Osteoporosis, unspecified osteoporosis type, unspecified pathological fracture presence    - romosozumab-aqqg (EVENITY) injection 210 mg    Prescription drug management             Return in about 4 weeks (around 9/7/2022), or if symptoms worsen or fail to improve.    Cortney Clark MD  [facility]    Tom   Ismael is a 72 year old accompanied by her self, presenting for the following health issues:  Follow Up ([facility] 4/12)      Patient is a 72-year-old white female here for her fifth Evenity injection.  Patient reports that she does not get headaches and has tolerated the medications okay.  She is interested in knowing why Evenity had black box warnings of MI and stroke.  Patient has never had these diagnoses in her past medical history.    Review of Systems   Constitutional, HEENT, cardiovascular, pulmonary, gi and gu systems are negative, except as otherwise noted.      Objective    There were no vitals taken for this visit.  There is no height or weight on file to calculate BMI.  Physical Exam   nonlabored breathing  NAD    Procedure: Some benefits discussed and patient was consented.  Alcohol used to clean the area of the skin and ethyl chloride to anesthetize the area.  1 injected each lateral thigh.  Band aids in place.  Well tolerated.    .  .  Again, thank you for allowing me to participate in the care of your patient.      Sincerely,    Cortney Clark MD

## 2022-08-10 NOTE — NURSING NOTE
[address] [address]  Dept: [phone]  ______________________________________________________________________________    Patient: Nadira Perez   : 1950   MRN: [id number]   August 10, 2022    INVASIVE PROCEDURE SAFETY CHECKLIST    Date: August 10, 2022   Procedure:evenity    Patient Name: Nadira Perez  MRN: [id number]  YOB: 1950    Action: Complete sections as appropriate. Any discrepancy results in a HARD COPY until resolved.     PRE PROCEDURE:  Patient ID verified with 2 identifiers (name and  or MRN): Yes  Procedure and site verified with patient/designee (when able): Yes  Accurate consent documentation in medical record: Yes  H&P (or appropriate assessment) documented in medical record: Yes  H&P must be up to 20 days prior to procedure and updates within 24 hours of procedure as applicable: Yes  Relevant diagnostic and radiology test results appropriately labeled and displayed as applicable: Yes  Procedure site(s) marked with provider initials: NA    TIMEOUT:  Time-Out performed immediately prior to starting procedure, including verbal and active participation of all team members addressing the following:Yes  * Correct patient identify  * Confirmed that the correct side and site are marked  * An accurate procedure consent form  * Agreement on the procedure to be done  * Correct patient position  * Relevant images and results are properly labeled and appropriately displayed  * The need to administer antibiotics or fluids for irrigation purposes during the procedure as applicable   * Safety precautions based on patient history or medication use    DURING PROCEDURE: Verification of correct person, site, and procedures any time the responsibility for care of the patient is transferred to another member of the care team.       Prior to injection, verified patient identity using patient's name and date of  birth.  Due to injection administration, patient instructed to remain in clinic for 15 minutes  afterwards, and to report any adverse reaction to me immediately.    Subcutaneous     Drug Amount Wasted:  None.  Vial/Syringe: Syringe  Expiration Date:  9/1/24      Pema Wang ATC  August 10, 2022

## 2022-08-16 ENCOUNTER — THERAPY VISIT (OUTPATIENT)
Dept: PHYSICAL THERAPY | Facility: CLINIC | Age: 72
End: 2022-08-16
Payer: COMMERCIAL

## 2022-08-16 DIAGNOSIS — Z98.1 S/P SPINAL FUSION: Primary | ICD-10-CM

## 2022-08-16 PROCEDURE — 97112 NEUROMUSCULAR REEDUCATION: CPT | Mod: GP | Performed by: PHYSICAL THERAPIST

## 2022-08-16 PROCEDURE — 97110 THERAPEUTIC EXERCISES: CPT | Mod: GP | Performed by: PHYSICAL THERAPIST

## 2022-08-25 ENCOUNTER — THERAPY VISIT (OUTPATIENT)
Dept: PHYSICAL THERAPY | Facility: CLINIC | Age: 72
End: 2022-08-25
Payer: COMMERCIAL

## 2022-08-25 DIAGNOSIS — Z98.1 S/P SPINAL FUSION: Primary | ICD-10-CM

## 2022-08-25 PROCEDURE — 97112 NEUROMUSCULAR REEDUCATION: CPT | Mod: GP | Performed by: PHYSICAL THERAPIST

## 2022-08-25 PROCEDURE — 97110 THERAPEUTIC EXERCISES: CPT | Mod: GP | Performed by: PHYSICAL THERAPIST

## 2022-09-06 ENCOUNTER — THERAPY VISIT (OUTPATIENT)
Dept: PHYSICAL THERAPY | Facility: CLINIC | Age: 72
End: 2022-09-06
Payer: COMMERCIAL

## 2022-09-06 DIAGNOSIS — Z98.1 S/P SPINAL FUSION: Primary | ICD-10-CM

## 2022-09-06 PROCEDURE — 97112 NEUROMUSCULAR REEDUCATION: CPT | Mod: GP | Performed by: PHYSICAL THERAPIST

## 2022-09-06 PROCEDURE — 97110 THERAPEUTIC EXERCISES: CPT | Mod: GP | Performed by: PHYSICAL THERAPIST

## 2022-09-07 ENCOUNTER — OFFICE VISIT (OUTPATIENT)
Dept: ORTHOPEDICS | Facility: CLINIC | Age: 72
End: 2022-09-07
Payer: COMMERCIAL

## 2022-09-07 DIAGNOSIS — J34.89 NOSE PAIN: ICD-10-CM

## 2022-09-07 DIAGNOSIS — M81.0 OSTEOPOROSIS, UNSPECIFIED OSTEOPOROSIS TYPE, UNSPECIFIED PATHOLOGICAL FRACTURE PRESENCE: ICD-10-CM

## 2022-09-07 DIAGNOSIS — M80.08XG PATHOLOGICAL FRACTURE OF VERTEBRA DUE TO AGE-RELATED OSTEOPOROSIS WITH DELAYED HEALING, SUBSEQUENT ENCOUNTER: Primary | ICD-10-CM

## 2022-09-07 DIAGNOSIS — S00.31XA NASAL ABRASION, INITIAL ENCOUNTER: ICD-10-CM

## 2022-09-07 PROCEDURE — 99213 OFFICE O/P EST LOW 20 MIN: CPT | Mod: 25 | Performed by: FAMILY MEDICINE

## 2022-09-07 PROCEDURE — 96372 THER/PROPH/DIAG INJ SC/IM: CPT | Performed by: FAMILY MEDICINE

## 2022-09-07 NOTE — NURSING NOTE
[address] [address]  Dept: [phone]  ______________________________________________________________________________    Patient: Ndaira Perez   : 1950   MRN: [id number]   2022    INVASIVE PROCEDURE SAFETY CHECKLIST    Date: 2022   Procedure:Evenity   Patient Name: Nadira Perez  MRN: [id number]  YOB: 1950    Action: Complete sections as appropriate. Any discrepancy results in a HARD COPY until resolved.     PRE PROCEDURE:  Patient ID verified with 2 identifiers (name and  or MRN): Yes  Procedure and site verified with patient/designee (when able): Yes  Accurate consent documentation in medical record: Yes  H&P (or appropriate assessment) documented in medical record: Yes  H&P must be up to 20 days prior to procedure and updates within 24 hours of procedure as applicable: Yes  Relevant diagnostic and radiology test results appropriately labeled and displayed as applicable: Yes  Procedure site(s) marked with provider initials: Yes    TIMEOUT:  Time-Out performed immediately prior to starting procedure, including verbal and active participation of all team members addressing the following:Yes  * Correct patient identify  * Confirmed that the correct side and site are marked  * An accurate procedure consent form  * Agreement on the procedure to be done  * Correct patient position  * Relevant images and results are properly labeled and appropriately displayed  * The need to administer antibiotics or fluids for irrigation purposes during the procedure as applicable   * Safety precautions based on patient history or medication use    DURING PROCEDURE: Verification of correct person, site, and procedures any time the responsibility for care of the patient is transferred to another member of the care team.       Prior to injection, verified patient identity using patient's name and date of  birth.  Due to injection administration, patient instructed to remain in clinic for 15 minutes  afterwards, and to report any adverse reaction to me immediately.    Subcutaneous     Drug Amount Wasted:  None.  Vial/Syringe: Syringe  Expiration Date:  9/1/24      Pema Wang ATC  September 7, 2022
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
## 2022-09-15 ENCOUNTER — OFFICE VISIT (OUTPATIENT)
Dept: ORTHOPEDICS | Facility: CLINIC | Age: 72
End: 2022-09-15
Payer: COMMERCIAL

## 2022-09-15 ENCOUNTER — ANCILLARY PROCEDURE (OUTPATIENT)
Dept: GENERAL RADIOLOGY | Facility: CLINIC | Age: 72
End: 2022-09-15
Attending: PREVENTIVE MEDICINE
Payer: COMMERCIAL

## 2022-09-15 VITALS — BODY MASS INDEX: 27.16 KG/M2 | WEIGHT: 169 LBS | HEIGHT: 66 IN

## 2022-09-15 DIAGNOSIS — R07.81 RIB PAIN ON RIGHT SIDE: ICD-10-CM

## 2022-09-15 DIAGNOSIS — S20.211A RIB CONTUSION, RIGHT, INITIAL ENCOUNTER: Primary | ICD-10-CM

## 2022-09-15 DIAGNOSIS — R07.81 RIB PAIN ON RIGHT SIDE: Primary | ICD-10-CM

## 2022-09-15 PROCEDURE — 71101 X-RAY EXAM UNILAT RIBS/CHEST: CPT | Mod: RT | Performed by: RADIOLOGY

## 2022-09-15 PROCEDURE — 99214 OFFICE O/P EST MOD 30 MIN: CPT | Performed by: PREVENTIVE MEDICINE

## 2022-09-15 NOTE — PROGRESS NOTES
HISTORY OF PRESENT ILLNESS  Ms. Perez is a pleasant 72 year old year old female who presents to clinic today with right-sided rib pain. Patient reports that on 9/8/22 she tripped while in her kitchen and the right side of her chest hit her counter top and then she fell to ground. She is complaining of pain with deep inhalation and with exertion. Patient points to her right mid-chest where she feels pain with breathing and with palpation.     She does use a walker for ambulation.     Nadira explains that she slowly feels like things are improving but wants to make sure she didn't fracture a rib    Patient needs refills on the following medications:     Location: right ribs/chest    Quality:  achy pain    Severity: 5/10 at worst      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     Corneal opacity     Hypothyroidism     Osteopenia, unspecified location     Aromatase inhibitor use     Chronic pain of right knee     DDD (degenerative disc disease), lumbar     Degenerative scoliosis in adult patient     Peripheral neuropathy     Spinal stenosis of lumbar region with neurogenic claudication     Spondylolisthesis of lumbar region     Brain lesion     Dermatochalasis of both upper eyelids     S/P spinal fusion     Chronic bilateral low back pain     PVD (posterior vitreous detachment), left eye     Vitreous opacities of left eye       Current Outpatient Medications   Medication Sig Dispense Refill      acetaminophen (TYLENOL) 500 MG tablet Take 500-1,000 mg by mouth every 6 hours as needed for mild pain       amLODIPine (NORVASC) 10 MG tablet Take 1 tablet (10 mg) by mouth daily 90 tablet 3     Ascorbic Acid (VITAMIN C) 500 MG CHEW Take 1 tablet by mouth daily       atorvastatin (LIPITOR) 80 MG tablet Take 1 tablet (80 mg) by mouth daily 90 tablet 3     Cholecalciferol (VITAMIN D3) 50 MCG (2000 UT) CAPS Take 6,000 Units by mouth daily 270 capsule 3     CRANBERRY EXTRACT PO Take 650 mg by mouth daily ~10 am  Takes 1       diclofenac (VOLTAREN) 1 % topical gel APPLY 4 GRAMS TO KNEES OR 2 GRAMS TO HANDS FOUR TIMES DAILY USING ENCLOSED DOSING CARD. 100 g 3     gabapentin (NEURONTIN) 300 MG capsule Take 3 capsules (900 mg) by mouth 3 times daily 270 capsule 11     HYDROcodone-acetaminophen (NORCO) 5-325 MG tablet Take 1 tablet by mouth every 6 hours as needed for severe pain 30 tablet 0     hydrOXYzine (VISTARIL) 25 MG capsule Take 1 capsule (25 mg) by mouth At Bedtime 90 capsule 3     ibuprofen (ADVIL/MOTRIN) 600 MG tablet Take 1 tablet (600 mg) by mouth every 6 hours as needed for other (mild and/or inflammatory pain) 30 tablet 0     levothyroxine (SYNTHROID/LEVOTHROID) 112 MCG tablet TAKE 1/2 TABLET BY MOUTH DAILY 45 tablet 3     lisinopril (ZESTRIL) 40 MG tablet Take 1 tablet (40 mg) by mouth daily 90 tablet 3     melatonin 5 MG tablet Take 10 mg by mouth At Bedtime        methocarbamol (ROBAXIN) 500 MG tablet Take 1 tablet (500 mg) by mouth 3 times daily 40 tablet 0     metoprolol succinate ER (TOPROL-XL) 50 MG 24 hr tablet Take 1 tablet (50 mg) by mouth daily 90 tablet 3     Multiple Vitamin (MULTI-VITAMIN) per tablet Take 1 tablet by mouth daily       naloxone (NARCAN) 4 MG/0.1ML nasal spray Spray 1 spray (4 mg) into one nostril alternating nostrils once as needed for opioid reversal every 2-3 minutes until assistance arrives 0.2 mL 0     ondansetron (ZOFRAN-ODT) 4 MG ODT tab Take 1 tablet (4 mg) by mouth every  12 hours as needed for nausea 180 tablet 3     senna-docusate (SENOKOT-S/PERICOLACE) 8.6-50 MG tablet Take 2 tablets by mouth 2 times daily 30 tablet 0     spironolactone (ALDACTONE) 25 MG tablet Take 2 tablets (50 mg) by mouth daily 180 tablet 3     Vaginal Lubricant (REPHRESH) GEL Place 2 g vaginally every 3 days 2 g 3       Allergies   Allergen Reactions     Erythromycin Nausea     Penicillins Hives     Around age 6 - doesn't recall full reaction, mother told her it was hives.     Has tolerated cephalsporins.        Family History   Problem Relation Age of Onset     Neurologic Disorder Mother         Anuerysm of Cerebral Artery, Dementia     Diabetes Mother      Thyroid Disease Mother         ,     Cerebrovascular Disease Mother      Dementia Mother      Osteoporosis Mother      Heart Disease Father         AAA     Hypertension Father      Circulatory Brother         Perihperal Neurophathy     Diabetes Maternal Grandmother      Asthma Maternal Grandmother      Chronic Obstructive Pulmonary Disease Maternal Grandfather         father     Asthma Maternal Grandfather      Diabetes Maternal Aunt         x2     Melanoma Maternal Aunt      Glaucoma Maternal Aunt      Breast Cancer Cousin      Dementia Other      Cancer Other         malignant melanoma     Hypertension Other      Hypertension Other      Cerebrovascular Disease Other      Cerebrovascular Disease Other      Obesity Other      Respiratory Other      Cancer Other      Diabetes Other      Asthma Other      Macular Degeneration No family hx of      Coronary Artery Disease No family hx of      Hyperlipidemia No family hx of      Kidney Disease No family hx of      Thrombosis No family hx of      Arthritis No family hx of      Depression No family hx of      Mental Illness No family hx of      Substance Abuse No family hx of      Cystic Fibrosis No family hx of      Early Death No family hx of      Coronary Artery Disease Early Onset No family hx of      Heart  Failure No family hx of      Bleeding Diathesis No family hx of      Ovarian Cancer No family hx of      Uterine Cancer No family hx of      Prostate Cancer No family hx of      Colorectal Cancer No family hx of      Pancreatic Cancer No family hx of      Lung Cancer No family hx of      Other Cancer No family hx of      Autoimmune Disease No family hx of      Unknown/Adopted No family hx of      Genetic Disorder No family hx of      Bleeding Disorder No family hx of      Clotting Disorder No family hx of      Anesthesia Reaction No family hx of      Social History     Socioeconomic History     Marital status:    Tobacco Use     Smoking status: Never Smoker     Smokeless tobacco: Never Used   Substance and Sexual Activity     Alcohol use: Not Currently     Alcohol/week: 7.0 standard drinks     Types: 7 Glasses of wine per week     Comment: Rare to occasional     Drug use: Yes     Types: Marijuana     Sexual activity: Not Currently     Partners: Male     Birth control/protection: Abstinence   Other Topics Concern      Service No     Caffeine Concern No     Occupational Exposure No     Hobby Hazards No     Sleep Concern No     Stress Concern No     Weight Concern No     Special Diet No     Back Care No     Exercise Yes     Comment: walks 4-6x  week for 20-30 min. each     Seat Belt Yes     Self-Exams Yes     Parent/sibling w/ CABG, MI or angioplasty before 65F 55M? No   Social History Narrative    .  Recently retired. 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        She lives with a renter.         She exercises 50 minutes three times a week.       Additional medical/Social/Surgical histories reviewed in [facility] and updated as appropriate.     REVIEW OF SYSTEMS (9/15/2022)  10 point ROS of systems including Constitutional, Eyes, Respiratory, Cardiovascular, Gastroenterology, Genitourinary, Integumentary, Musculoskeletal,  "Psychiatric, Allergic/Immunologic were all negative except for pertinent positives noted in my HPI.     PHYSICAL EXAM  VSS  Vital Signs: Ht 1.676 m (5' 6\")   Wt 76.7 kg (169 lb)   BMI 27.28 kg/m   Patient declined being weighed. Body mass index is 27.28 kg/m .    General  - normal appearance, in no obvious distress  HEENT  - conjunctivae not injected, moist mucous membranes, normocephalic/atraumatic head, ears normal appearance, no lesions, mouth normal appearance, no scars, normal dentition and teeth present  CV  - normal radial pulse  Pulm  - normal respiratory pattern, non-labored  Musculoskeletal - chest/shoulder  - inspection: normal bone and joint alignment, no obvious deformity, no scapular winging, no AC step-off  - palpation: no bony or soft tissue tenderness, normal clavicle, non-tender AC, ttp over mid right ribs, no specific bony tenderness  - ROM:  Normal ROM of both shoulders, flexion/extension and rotation of torso normal  Lungs: has CTAB, no wheezing, mild pain on right ribs with deep inhalation  Neuro  - no sensory or motor deficit, grossly normal coordination, normal muscle tone  Skin  - no ecchymosis, erythema, warmth, or induration, no obvious rash  Psych  - interactive, appropriate, normal mood and affect      ASSESSMENT & PLAN  71 yo female with right rib chest contusion    I independently reviewed the following imaging studies:  Chest xray: shows no rib fractures  Discussed use of lidocaine patches and breathing exercises  Tylenol PRN  F/u in 1-2 weeks if condition persists        Appropriate PPE was utilized for prevention of spread of Covid-19.  Willis Toth MD, CAQSM    "

## 2022-09-15 NOTE — LETTER
9/15/2022      RE: Nadira Perez  [address]  [place] [facility] [address]     Dear Colleague,    Thank you for referring your patient, Nadira Perez, to the [facility] [place]. Please see a copy of my visit note below.    HISTORY OF PRESENT ILLNESS  Ms. Perez is a pleasant 72 year old year old female who presents to clinic today with right-sided rib pain. Patient reports that on 9/8/22 she tripped while in her kitchen and the right side of her chest hit her counter top and then she fell to ground. She is complaining of pain with deep inhalation and with exertion. Patient points to her right mid-chest where she feels pain with breathing and with palpation.     She does use a walker for ambulation.     Nadira explains that     Patient needs refills on the following medications:     Location:     Quality:  achy pain    Severity: 5/10 at worst    Duration: see above  Timing: occurs intermittently  Context: occurs while exercising and lifting and using the joint  Modifying factors:  resting and non-use makes it better, movement and use makes it worse  Associated signs & symptoms: none  Previous similar pain: yes  Exercise: walking, ROM exercises with weights  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     Corneal opacity     Hypothyroidism     Osteopenia, unspecified location     Aromatase inhibitor use     Chronic  pain of right knee     DDD (degenerative disc disease), lumbar     Degenerative scoliosis in adult patient     Peripheral neuropathy     Spinal stenosis of lumbar region with neurogenic claudication     Spondylolisthesis of lumbar region     Brain lesion     Dermatochalasis of both upper eyelids     S/P spinal fusion     Chronic bilateral low back pain     PVD (posterior vitreous detachment), left eye     Vitreous opacities of left eye       Current Outpatient Medications   Medication Sig Dispense Refill     acetaminophen (TYLENOL) 500 MG tablet Take 500-1,000 mg by mouth every 6 hours as needed for mild pain       amLODIPine (NORVASC) 10 MG tablet Take 1 tablet (10 mg) by mouth daily 90 tablet 3     Ascorbic Acid (VITAMIN C) 500 MG CHEW Take 1 tablet by mouth daily       atorvastatin (LIPITOR) 80 MG tablet Take 1 tablet (80 mg) by mouth daily 90 tablet 3     Cholecalciferol (VITAMIN D3) 50 MCG (2000 UT) CAPS Take 6,000 Units by mouth daily 270 capsule 3     CRANBERRY EXTRACT PO Take 650 mg by mouth daily ~10 am  Takes 1       diclofenac (VOLTAREN) 1 % topical gel APPLY 4 GRAMS TO KNEES OR 2 GRAMS TO HANDS FOUR TIMES DAILY USING ENCLOSED DOSING CARD. 100 g 3     gabapentin (NEURONTIN) 300 MG capsule Take 3 capsules (900 mg) by mouth 3 times daily 270 capsule 11     HYDROcodone-acetaminophen (NORCO) 5-325 MG tablet Take 1 tablet by mouth every 6 hours as needed for severe pain 30 tablet 0     hydrOXYzine (VISTARIL) 25 MG capsule Take 1 capsule (25 mg) by mouth At Bedtime 90 capsule 3     ibuprofen (ADVIL/MOTRIN) 600 MG tablet Take 1 tablet (600 mg) by mouth every 6 hours as needed for other (mild and/or inflammatory pain) 30 tablet 0     levothyroxine (SYNTHROID/LEVOTHROID) 112 MCG tablet TAKE 1/2 TABLET BY MOUTH DAILY 45 tablet 3     lisinopril (ZESTRIL) 40 MG tablet Take 1 tablet (40 mg) by mouth daily 90 tablet 3     melatonin 5 MG tablet Take 10 mg by mouth At Bedtime        methocarbamol (ROBAXIN) 500 MG tablet  Take 1 tablet (500 mg) by mouth 3 times daily 40 tablet 0     metoprolol succinate ER (TOPROL-XL) 50 MG 24 hr tablet Take 1 tablet (50 mg) by mouth daily 90 tablet 3     Multiple Vitamin (MULTI-VITAMIN) per tablet Take 1 tablet by mouth daily       naloxone (NARCAN) 4 MG/0.1ML nasal spray Spray 1 spray (4 mg) into one nostril alternating nostrils once as needed for opioid reversal every 2-3 minutes until assistance arrives 0.2 mL 0     ondansetron (ZOFRAN-ODT) 4 MG ODT tab Take 1 tablet (4 mg) by mouth every 12 hours as needed for nausea 180 tablet 3     senna-docusate (SENOKOT-S/PERICOLACE) 8.6-50 MG tablet Take 2 tablets by mouth 2 times daily 30 tablet 0     spironolactone (ALDACTONE) 25 MG tablet Take 2 tablets (50 mg) by mouth daily 180 tablet 3     Vaginal Lubricant (REPHRESH) GEL Place 2 g vaginally every 3 days 2 g 3       Allergies   Allergen Reactions     Erythromycin Nausea     Penicillins Hives     Around age 6 - doesn't recall full reaction, mother told her it was hives.     Has tolerated cephalsporins.        Family History   Problem Relation Age of Onset     Neurologic Disorder Mother         Anuerysm of Cerebral Artery, Dementia     Diabetes Mother      Thyroid Disease Mother         ,     Cerebrovascular Disease Mother      Dementia Mother      Osteoporosis Mother      Heart Disease Father         AAA     Hypertension Father      Circulatory Brother         Perihperal Neurophathy     Diabetes Maternal Grandmother      Asthma Maternal Grandmother      Chronic Obstructive Pulmonary Disease Maternal Grandfather         father     Asthma Maternal Grandfather      Diabetes Maternal Aunt         x2     Melanoma Maternal Aunt      Glaucoma Maternal Aunt      Breast Cancer Cousin      Dementia Other      Cancer Other         malignant melanoma     Hypertension Other      Hypertension Other      Cerebrovascular Disease Other      Cerebrovascular Disease Other      Obesity Other      Respiratory Other       Cancer Other      Diabetes Other      Asthma Other      Macular Degeneration No family hx of      Coronary Artery Disease No family hx of      Hyperlipidemia No family hx of      Kidney Disease No family hx of      Thrombosis No family hx of      Arthritis No family hx of      Depression No family hx of      Mental Illness No family hx of      Substance Abuse No family hx of      Cystic Fibrosis No family hx of      Early Death No family hx of      Coronary Artery Disease Early Onset No family hx of      Heart Failure No family hx of      Bleeding Diathesis No family hx of      Ovarian Cancer No family hx of      Uterine Cancer No family hx of      Prostate Cancer No family hx of      Colorectal Cancer No family hx of      Pancreatic Cancer No family hx of      Lung Cancer No family hx of      Other Cancer No family hx of      Autoimmune Disease No family hx of      Unknown/Adopted No family hx of      Genetic Disorder No family hx of      Bleeding Disorder No family hx of      Clotting Disorder No family hx of      Anesthesia Reaction No family hx of      Social History     Socioeconomic History     Marital status:    Tobacco Use     Smoking status: Never Smoker     Smokeless tobacco: Never Used   Substance and Sexual Activity     Alcohol use: Not Currently     Alcohol/week: 7.0 standard drinks     Types: 7 Glasses of wine per week     Comment: Rare to occasional     Drug use: Yes     Types: Marijuana     Sexual activity: Not Currently     Partners: Male     Birth control/protection: Abstinence   Other Topics Concern      Service No     Caffeine Concern No     Occupational Exposure No     Hobby Hazards No     Sleep Concern No     Stress Concern No     Weight Concern No     Special Diet No     Back Care No     Exercise Yes     Comment: walks 4-6x  week for 20-30 min. each     Seat Belt Yes     Self-Exams Yes     Parent/sibling w/ CABG, MI or angioplasty before 65F 55M? No   Social History Narrative     .  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        She lives with a renter.         She exercises 50 minutes three times a week.       Additional medical/Social/Surgical histories reviewed in [facility] and updated as appropriate.     REVIEW OF SYSTEMS (9/15/2022)  10 point ROS of systems including Constitutional, Eyes, Respiratory, Cardiovascular, Gastroenterology, Genitourinary, Integumentary, Musculoskeletal, Psychiatric, Allergic/Immunologic were all negative except for pertinent positives noted in my HPI.     PHYSICAL EXAM  VSS    ASSESSMENT & PLAN      I independently reviewed the following imaging studies:    Patient HAS / HAS NOT completed physical therapy for 4-6 weeks  Patient has been doing home exercise physical therapy program for this problem      Appropriate PPE was utilized for prevention of spread of Covid-19.  Willis Toth MD, [facility]

## 2022-09-19 ENCOUNTER — OFFICE VISIT (OUTPATIENT)
Dept: OPHTHALMOLOGY | Facility: CLINIC | Age: 72
End: 2022-09-19
Payer: COMMERCIAL

## 2022-09-19 DIAGNOSIS — H02.834 DERMATOCHALASIS OF BOTH UPPER EYELIDS: Primary | ICD-10-CM

## 2022-09-19 DIAGNOSIS — H02.831 DERMATOCHALASIS OF BOTH UPPER EYELIDS: Primary | ICD-10-CM

## 2022-09-19 DIAGNOSIS — Z98.890 POSTOPERATIVE EYE STATE: ICD-10-CM

## 2022-09-19 PROCEDURE — 99213 OFFICE O/P EST LOW 20 MIN: CPT | Mod: 24 | Performed by: OPHTHALMOLOGY

## 2022-09-19 ASSESSMENT — TONOMETRY
OS_IOP_MMHG: 12
OD_IOP_MMHG: 12
IOP_METHOD: ICARE

## 2022-09-19 ASSESSMENT — EXTERNAL EXAM - RIGHT EYE: OD_EXAM: NORMAL

## 2022-09-19 ASSESSMENT — VISUAL ACUITY
OS_SC+: --2
OS_SC: 20/50
OD_SC: 20/30
OD_SC+: -3
METHOD: SNELLEN - LINEAR

## 2022-09-19 ASSESSMENT — CONF VISUAL FIELD
OS_NORMAL: 1
OD_NORMAL: 1
METHOD: COUNTING FINGERS

## 2022-09-19 ASSESSMENT — SLIT LAMP EXAM - LIDS
COMMENTS: NORMAL
COMMENTS: NORMAL

## 2022-09-19 ASSESSMENT — EXTERNAL EXAM - LEFT EYE: OS_EXAM: NORMAL

## 2022-09-19 NOTE — NURSING NOTE
Chief Complaints and History of Present Illnesses   Patient presents with     Follow Up     Chief Complaint(s) and History of Present Illness(es)     Follow Up     Laterality: both eyes    Associated symptoms: Negative for eye pain, itching and discharge    Pain scale: 0/10              Comments     Pt notes that her BLL look great, but feels there is no changes to the upper lids.      Estefani WRAY September 19, 2022 11:07 AM

## 2022-09-19 NOTE — PROGRESS NOTES
Chief Complaints and History of Present Illnesses   Patient presents with     Follow Up     Chief Complaint(s) and History of Present Illness(es)     Follow Up     In both eyes.  Associated symptoms include Negative for eye pain, itching   and discharge.  Pain was noted as 0/10.              Comments     Pt notes that her BLL look great, but feels there is no changes to the   upper lids.      Estefani Neal NILS September 19, 2022 11:07 AM                       Assessment & Plan     Nadira Perez is a 72 year old female with the following diagnoses:   1. Dermatochalasis of both upper eyelids    2. Postoperative eye state       Return to clinic as needed               Attending Physician Attestation:  Complete documentation of historical and exam elements from today's encounter can be found in the full encounter summary report (not reduplicated in this progress note).  I personally obtained the chief complaint(s) and history of present illness.  I confirmed and edited as necessary the review of systems, past medical/surgical history, family history, social history, and examination findings as documented by others; and I examined the patient myself.  I personally reviewed the relevant tests, images, and reports as documented above.  I formulated and edited as necessary the assessment and plan and discussed the findings and management plan with the patient and family. I personally reviewed the ophthalmic test(s) associated with this encounter, agree with the interpretation(s) as documented by the resident/fellow, and have edited the corresponding report(s) as necessary.   -Jemal Sanchez MD  11:15 AM 9/19/2022

## 2022-09-20 ENCOUNTER — THERAPY VISIT (OUTPATIENT)
Dept: PHYSICAL THERAPY | Facility: CLINIC | Age: 72
End: 2022-09-20
Payer: COMMERCIAL

## 2022-09-20 DIAGNOSIS — Z98.1 S/P SPINAL FUSION: Primary | ICD-10-CM

## 2022-09-20 PROCEDURE — 97110 THERAPEUTIC EXERCISES: CPT | Mod: GP | Performed by: PHYSICAL THERAPIST

## 2022-09-20 PROCEDURE — 97112 NEUROMUSCULAR REEDUCATION: CPT | Mod: GP | Performed by: PHYSICAL THERAPIST

## 2022-09-21 ENCOUNTER — TELEPHONE (OUTPATIENT)
Dept: ORTHOPEDICS | Facility: CLINIC | Age: 72
End: 2022-09-21

## 2022-09-21 NOTE — TELEPHONE ENCOUNTER
[facility] Call Center    Phone Message    May a detailed message be left on voicemail: no     Reason for Call: Order(s): Other:   Requesting acupuncture order to be changed. In order for Medicare to cover the main diagnosis needs to be Chronic Low Back Pain  Fax [phone]    Action Taken: Message routed to:  [facility]    Travel Screening: Not Applicable

## 2022-09-25 ASSESSMENT — ENCOUNTER SYMPTOMS
STIFFNESS: 1
CHILLS: 0
MUSCLE CRAMPS: 1
TASTE DISTURBANCE: 0
NECK PAIN: 1
INCREASED ENERGY: 0
BOWEL INCONTINENCE: 0
ALTERED TEMPERATURE REGULATION: 0
LOSS OF CONSCIOUSNESS: 0
BACK PAIN: 1
EYE PAIN: 0
MEMORY LOSS: 0
SPEECH CHANGE: 0
BLOATING: 1
DECREASED CONCENTRATION: 0
EYE REDNESS: 1
NECK MASS: 1
HALLUCINATIONS: 0
ARTHRALGIAS: 1
SEIZURES: 0
WEIGHT GAIN: 1
PALPITATIONS: 1
PANIC: 0
NAUSEA: 1
HYPOTENSION: 1
DECREASED LIBIDO: 1
TREMORS: 0
HYPERTENSION: 1
ORTHOPNEA: 0
DYSURIA: 0
WEIGHT LOSS: 0
SINUS PAIN: 0
SORE THROAT: 0
POLYDIPSIA: 1
MYALGIAS: 1
RECTAL PAIN: 0
ABDOMINAL PAIN: 0
EYE IRRITATION: 1
FLANK PAIN: 0
BLOOD IN STOOL: 0
SINUS CONGESTION: 1
TINGLING: 1
LEG PAIN: 1
DOUBLE VISION: 0
EYE WATERING: 0
INSOMNIA: 1
SWOLLEN GLANDS: 1
TROUBLE SWALLOWING: 1
DECREASED APPETITE: 0
DIZZINESS: 1
SYNCOPE: 0
FATIGUE: 0
SMELL DISTURBANCE: 0
WEAKNESS: 1
HOARSE VOICE: 0
CONSTIPATION: 0
EXERCISE INTOLERANCE: 0
DEPRESSION: 0
DIARRHEA: 0
HEARTBURN: 1
HOT FLASHES: 0
NUMBNESS: 1
BRUISES/BLEEDS EASILY: 0
JAUNDICE: 0
MUSCLE WEAKNESS: 1
LIGHT-HEADEDNESS: 1
DIFFICULTY URINATING: 1
POLYPHAGIA: 0
DISTURBANCES IN COORDINATION: 0
VOMITING: 1
NIGHT SWEATS: 0
HEMATURIA: 0
FEVER: 0
HEADACHES: 0
SLEEP DISTURBANCES DUE TO BREATHING: 0
NERVOUS/ANXIOUS: 0
PARALYSIS: 0
JOINT SWELLING: 1

## 2022-09-26 ENCOUNTER — OFFICE VISIT (OUTPATIENT)
Dept: PALLIATIVE MEDICINE | Facility: OTHER | Age: 72
End: 2022-09-26
Attending: PHYSICIAN ASSISTANT
Payer: COMMERCIAL

## 2022-09-26 DIAGNOSIS — M54.50 CHRONIC LOW BACK PAIN: ICD-10-CM

## 2022-09-26 DIAGNOSIS — G89.29 CHRONIC LOW BACK PAIN: ICD-10-CM

## 2022-09-26 DIAGNOSIS — Z98.1 S/P SPINAL FUSION: ICD-10-CM

## 2022-09-26 NOTE — PROCEDURES
ACUPUNCTURIST TREATMENT NOTE      Nadira Perez, a 72 year old female, is here today for Initial exam. Patient is referred by Ariannaiwe.    HPI  Main Complaint: Chronic low back pain  Patient reports a history of low back pain for several years.  Patient has a significant history affecting her back including osteopenia and osteoporosis, degenerative disc disease, degenerative scoliosis, spinal stenosis and  Spondylolisthesis. She also reports history of neuropathy in both feet x15-20 years.  She explains that the neuropathy is idiopathic and her brother has same problems.  She reports significant balance issues and she has fallen due to her impaired balance from the neuropathy.  She explains that sitting does not aggravate her pain but walking does.  She reports that she gets muscle spasms in her back and that she does gentle stretching to alleviate the muscle spasms. She is able to identify the primary area of pain is at (L) SI joint.        Past Medical History  Past Medical History Reviewed: Yes   has a past medical history of Arthritis, Bone disease, Breast cancer (H), H/O kyphoplasty, Hearing problem, History of kidney stones, History of radiation therapy, Hyperlipemia, Hypertension, Hypopotassemia, Kidney problem, Lymph edema, Medullary sponge kidney, Osteopenia, PONV (postoperative nausea and vomiting), Reduced vision, Squamous cell skin cancer, and Thyroid disease.    Objective  Basic Exam Completed:   No    TCM Exam Completed: Yes   Limbs/Back: Lower Back    Tongue/Pulse Exam Completed: Yes           Patient Assessment  Patient Type: Pain  Patient Complaint: Chronic low back pain; pain primarily at (L) SI joint    Acupuncture 9/26/2022   Intervention Reason Pain   Pain Location low back   Pre-session Pain Rating 4       TCM Diagnosis: Qi Stagnation;Blood Stagnation;Kidney Qi Deficiency      Treatment Principle: channel obstruction, heart fire    TCM / Acupuncture Treatment  Acupuncture Points:       Initial  insertions: HTJJ L2-L5, quadratus lumborum MP, Raj Tomas, Glute medius MP, Du 20, scalp x4       Second insertions: GB 34, BL 57, BL 60, BL 62, BL 65                    Accessory Techniques 9/26/2022   Accessory Techniques TDP Heat Lamp   TDP Heat Lamp location used over low back     Oswestry Disability Index (JOHN   Nish Micky 1980, All rights reserved) 7/25/2022 8/8/2022   Section 1 - Pain intensity The pain is moderate at the moment. The pain is moderate at the moment.   Section 2 - Personal care (washing, dressing, etc.)  I need some help but manage most of my personal care. I need some help but manage most of my personal care.   Section 3 - Lifting I can only lift very light weights. I can only lift very light weights.   Section 4 - Walking I can only walk using a cane or crutches. I can only walk using a cane or crutches.   Section 5 - Sitting Pain prevents me from sitting for more than half an hour. Pain prevents me from sitting for more than half an hour.   Section 6 - Standing Pain prevents me from standing for more than 10 minutes. Pain prevents me from standing for more than 10 minutes.   Section 7 - Sleeping Because of pain I have less than 4 hours sleep. Because of pain I have less than 4 hours sleep.   Section 8 - Sex life (if applicable) Not answered/NA Not answered/NA   Section 9 - Social life Pain has restricted my social life and I do not go out as often. Pain has restricted my social life and I do not go out as often.   Section 10 - Traveling I can travel anywhere without pain. I can travel anywhere without pain.   Oswestry Score (%) 57.78 57.78       Assessment and Plan  Treatment Observations: Pt. rested comfortably  Acupuncture Treatment Recommendations:         It is my recommendation that this patient seek advice from their Primary Care Provider about active symptoms not addressed during this visit. The risks and benefits of acupuncture were reviewed and the patient stated understanding.  The patient's questions were answered to their satisfaction. Consent was provided for treatment. We thank you for the referral and opportunity to treat this patient.    Time Spent with Patient:   I spent a total of 40 minutes face-to-face with Nadira Perez during today's office visit.     Daphne Kothari L.Ac.

## 2022-09-27 ENCOUNTER — MYC REFILL (OUTPATIENT)
Dept: INTERNAL MEDICINE | Facility: CLINIC | Age: 72
End: 2022-09-27

## 2022-09-27 DIAGNOSIS — M51.369 DDD (DEGENERATIVE DISC DISEASE), LUMBAR: ICD-10-CM

## 2022-09-27 DIAGNOSIS — M48.062 SPINAL STENOSIS OF LUMBAR REGION WITH NEUROGENIC CLAUDICATION: ICD-10-CM

## 2022-09-27 RX ORDER — HYDROCODONE BITARTRATE AND ACETAMINOPHEN 5; 325 MG/1; MG/1
1 TABLET ORAL EVERY 6 HOURS PRN
Qty: 30 TABLET | Refills: 0 | Status: SHIPPED | OUTPATIENT
Start: 2022-09-27 | End: 2023-03-30

## 2022-09-27 NOTE — TELEPHONE ENCOUNTER
HYDROcodone-acetaminophen (NORCO) 5-325 MG tablet      Last Written Prescription Date:  6/27/22  Last Fill Quantity: 30,   # refills: 0  Last Office Visit : 5/3/22  Future Office visit:  None scheduled    Routing refill request to provider for review/approval because:  Drug controlled substance

## 2022-09-27 NOTE — PROGRESS NOTES
ACUPUNCTURIST TREATMENT NOTE        Nadira Perez, a 72 year old female, is here today for Initial exam. Patient is referred by Ariannaiwe.     HPI  Main Complaint: Chronic low back pain  Patient reports a history of low back pain for several years.  Patient has a significant history affecting her back including osteopenia and osteoporosis, degenerative disc disease, degenerative scoliosis, spinal stenosis and  Spondylolisthesis. She also reports history of neuropathy in both feet x15-20 years.  She explains that the neuropathy is idiopathic and her brother has same problems.  She reports significant balance issues and she has fallen due to her impaired balance from the neuropathy.  She explains that sitting does not aggravate her pain but walking does.  She reports that she gets muscle spasms in her back and that she does gentle stretching to alleviate the muscle spasms. She is able to identify the primary area of pain is at (L) SI joint.         Past Medical History  Past Medical History Reviewed: Yes   has a past medical history of Arthritis, Bone disease, Breast cancer (H), H/O kyphoplasty, Hearing problem, History of kidney stones, History of radiation therapy, Hyperlipemia, Hypertension, Hypopotassemia, Kidney problem, Lymph edema, Medullary sponge kidney, Osteopenia, PONV (postoperative nausea and vomiting), Reduced vision, Squamous cell skin cancer, and Thyroid disease.     Objective  Basic Exam Completed:   No     TCM Exam Completed: Yes   Limbs/Back: Lower Back     Tongue/Pulse Exam Completed: Yes            Patient Assessment  Patient Type: Pain  Patient Complaint: Chronic low back pain; pain primarily at (L) SI joint     Acupuncture 9/26/2022   Intervention Reason Pain   Pain Location low back   Pre-session Pain Rating 4         TCM Diagnosis: Qi Stagnation;Blood Stagnation;Kidney Qi Deficiency       Treatment Principle: channel obstruction, heart fire     TCM / Acupuncture Treatment  Acupuncture  Points:       Initial insertions: HTJJ L2-L5, quadratus lumborum MP, Raj Tomas, Glute medius MP, Du 20, scalp x4       Second insertions: GB 34, BL 57, BL 60, BL 62, BL 65                Accessory Techniques 9/26/2022   Accessory Techniques TDP Heat Lamp   TDP Heat Lamp location used over low back      Oswestry Disability Index (JOHN   Nish Oakman 1980, All rights reserved) 7/25/2022 8/8/2022   Section 1 - Pain intensity The pain is moderate at the moment. The pain is moderate at the moment.   Section 2 - Personal care (washing, dressing, etc.)  I need some help but manage most of my personal care. I need some help but manage most of my personal care.   Section 3 - Lifting I can only lift very light weights. I can only lift very light weights.   Section 4 - Walking I can only walk using a cane or crutches. I can only walk using a cane or crutches.   Section 5 - Sitting Pain prevents me from sitting for more than half an hour. Pain prevents me from sitting for more than half an hour.   Section 6 - Standing Pain prevents me from standing for more than 10 minutes. Pain prevents me from standing for more than 10 minutes.   Section 7 - Sleeping Because of pain I have less than 4 hours sleep. Because of pain I have less than 4 hours sleep.   Section 8 - Sex life (if applicable) Not answered/NA Not answered/NA   Section 9 - Social life Pain has restricted my social life and I do not go out as often. Pain has restricted my social life and I do not go out as often.   Section 10 - Traveling I can travel anywhere without pain. I can travel anywhere without pain.   Oswestry Score (%) 57.78 57.78         Assessment and Plan  Treatment Observations: Pt. rested comfortably  Acupuncture Treatment Recommendations:       It is my recommendation that this patient seek advice from their Primary Care Provider about active symptoms not addressed during this visit. The risks and benefits of acupuncture were reviewed and the patient  stated understanding. The patient's questions were answered to their satisfaction. Consent was provided for treatment. We thank you for the referral and opportunity to treat this patient.     Time Spent with Patient:   I spent a total of 40 minutes face-to-face with Nadira Perez during today's office visit.      Daphne Kothari L.Ac.       Answers for HPI/ROS submitted by the patient on 9/25/2022  General Symptoms: Yes  Skin Symptoms: No  HENT Symptoms: Yes  EYE SYMPTOMS: Yes  HEART SYMPTOMS: Yes  LUNG SYMPTOMS: No  INTESTINAL SYMPTOMS: Yes  URINARY SYMPTOMS: Yes  GYNECOLOGIC SYMPTOMS: Yes  BREAST SYMPTOMS: No  SKELETAL SYMPTOMS: Yes  BLOOD SYMPTOMS: Yes  NERVOUS SYSTEM SYMPTOMS: Yes  MENTAL HEALTH SYMPTOMS: Yes  Ear pain: No  Ear discharge: No  Hearing loss: Yes  Tinnitus: No  Nosebleeds: No  Congestion: Yes  Sinus pain: No  Trouble swallowing: Yes   Voice hoarseness: No  Mouth sores: Yes  Sore throat: No  Tooth pain: No  Gum tenderness: Yes  Bleeding gums: Yes  Change in taste: No  Change in sense of smell: No  Dry mouth: Yes  Hearing aid used: Yes  Neck lump: Yes  Fever: No  Loss of appetite: No  Weight loss: No  Weight gain: Yes  Fatigue: No  Night sweats: No  Chills: No  Increased stress: No  Excessive hunger: No  Excessive thirst: Yes  Feeling hot or cold when others believe the temperature is normal: No  Loss of height: No  Post-operative complications: No  Surgical site pain: No  Hallucinations: No  Change in or Loss of Energy: No  Hyperactivity: No  Confusion: No  Eye pain: No  Vision loss: No  Dry eyes: Yes  Watery eyes: No  Eye bulging: No  Double vision: No  Flashing of lights: No  Spots: No  Floaters: No  Redness: Yes  Crossed eyes: No  Tunnel Vision: No  Yellowing of eyes: No  Eye irritation: Yes  Chest pain or pressure: No  Fast or irregular heartbeat: Yes  Pain in legs with walking: Yes  Trouble breathing while lying down: No  Fingers or toes appear blue: No  High blood pressure: Yes  Low blood  pressure: Yes  Fainting: No  Murmurs: Yes  Pacemaker: No  Varicose veins: Yes  Wake up at night with shortness of breath: No  Light-headedness: Yes  Exercise intolerance: No  Heart burn or indigestion: Yes  Nausea: Yes  Vomiting: Yes  Abdominal pain: No  Bloating: Yes  Constipation: No  Diarrhea: No  Blood in stool: No  Black stools: No  Rectal or Anal pain: No  Fecal incontinence: No  Yellowing of skin or eyes: No  Vomit with blood: No  Change in stools: No  Trouble holding urine or incontinence: Yes  Pain or burning: No  Trouble starting or stopping: Yes  Increased frequency of urination: Yes  Blood in urine: No  Decreased frequency of urination: No  Frequent nighttime urination: Yes  Flank pain: No  Difficulty emptying bladder: Yes  Bleeding or spotting between periods: No  Heavy or painful periods: No  Irregular periods: No  Vaginal discharge: No  Hot flashes: No  Vaginal dryness: Yes  Genital ulcers: No  Reduced libido: Yes  Painful intercourse: Yes  Difficulty with sexual arousal: Yes  Post-menopausal bleeding: No  Back pain: Yes  Muscle aches: Yes  Neck pain: Yes  Swollen joints: Yes  Joint pain: Yes  Bone pain: Yes  Muscle cramps: Yes  Muscle weakness: Yes  Joint stiffness: Yes  Bone fracture: No  Edema or swelling: Yes  Anemia: No  Swollen glands: Yes  Easy bleeding or bruising: No  Trouble with coordination: No  Dizziness or trouble with balance: Yes  Fainting or black-out spells: No  Memory loss: No  Headache: No  Seizures: No  Speech problems: No  Tingling: Yes  Tremor: No  Weakness: Yes  Difficulty walking: Yes  Paralysis: No  Numbness: Yes  Nervous or Anxious: No  Depression: No  Trouble sleeping: Yes  Trouble thinking or concentrating: No  Mood changes: No  Panic attacks: No

## 2022-10-04 ENCOUNTER — OFFICE VISIT (OUTPATIENT)
Dept: PALLIATIVE MEDICINE | Facility: OTHER | Age: 72
End: 2022-10-04
Payer: COMMERCIAL

## 2022-10-04 DIAGNOSIS — M54.59 OTHER LOW BACK PAIN: ICD-10-CM

## 2022-10-04 DIAGNOSIS — M54.50 CHRONIC LOW BACK PAIN: Primary | ICD-10-CM

## 2022-10-04 DIAGNOSIS — G89.29 CHRONIC LOW BACK PAIN: Primary | ICD-10-CM

## 2022-10-04 PROCEDURE — 97810 ACUP 1/> WO ESTIM 1ST 15 MIN: CPT | Performed by: ACUPUNCTURIST

## 2022-10-04 PROCEDURE — 97811 ACUP 1/> W/O ESTIM EA ADD 15: CPT | Performed by: ACUPUNCTURIST

## 2022-10-04 NOTE — PROGRESS NOTES
ACUPUNCTURIST TREATMENT NOTE      Nadira Perez, a 72 year old female, is here today for Follow - Up exam. Patient is referred by Anyiwe.    HPI  Main Complaint: Chronic low back pain with (L) sciatica  Secondary Complaints:     Past Medical History  Past Medical History Reviewed: No   has a past medical history of Arthritis, Bone disease, Breast cancer (H), H/O kyphoplasty, Hearing problem, History of kidney stones, History of radiation therapy, Hyperlipemia, Hypertension, Hypopotassemia, Kidney problem, Lymph edema, Medullary sponge kidney, Osteopenia, PONV (postoperative nausea and vomiting), Reduced vision, Squamous cell skin cancer, and Thyroid disease.    Objective  Basic Exam Completed:   Constitutional: Well Groomed and Normal Weight and Normal Appearance    TCM Exam Completed: Yes   Limbs/Back: Lower Back    Tongue/Pulse Exam Completed: No    Patient Assessment  Patient Type:    Patient Complaint: Chronic low back pain    Acupuncture 9/26/2022 10/4/2022   Intervention Reason Pain Pain   Pain Location low back low back   Pre-session Pain Rating 4 6   Post-session Pain Rating - 4       TCM Diagnosis: Qi Stagnation;Blood Stagnation;Kidney Qi Deficiency      Treatment Principle: channel obstruction, heart fire    TCM / Acupuncture Treatment  Acupuncture Points:       Initial insertions: Marainn @ L4, 5, Raj deysi, Bl 31, 32, (L) Gb 30       Second insertions: Gb 36, (L) Bl 60, 62, 65, (R) Si 3, (R) Ling gu                    Accessory Techniques 9/26/2022 10/4/2022   Accessory Techniques TDP Heat Lamp TDP Heat Lamp   TDP Heat Lamp location used over low back over low back     Oswestry Disability Index (JOHN   Nish Weeks 1980, All rights reserved) 7/25/2022 8/8/2022   Section 1 - Pain intensity The pain is moderate at the moment. The pain is moderate at the moment.   Section 2 - Personal care (washing, dressing, etc.)  I need some help but manage most of my personal care. I need some help but manage  most of my personal care.   Section 3 - Lifting I can only lift very light weights. I can only lift very light weights.   Section 4 - Walking I can only walk using a cane or crutches. I can only walk using a cane or crutches.   Section 5 - Sitting Pain prevents me from sitting for more than half an hour. Pain prevents me from sitting for more than half an hour.   Section 6 - Standing Pain prevents me from standing for more than 10 minutes. Pain prevents me from standing for more than 10 minutes.   Section 7 - Sleeping Because of pain I have less than 4 hours sleep. Because of pain I have less than 4 hours sleep.   Section 8 - Sex life (if applicable) Not answered/NA Not answered/NA   Section 9 - Social life Pain has restricted my social life and I do not go out as often. Pain has restricted my social life and I do not go out as often.   Section 10 - Traveling I can travel anywhere without pain. I can travel anywhere without pain.   Oswestry Score (%) 57.78 57.78       Assessment and Plan  Treatment Observations:    Acupuncture Treatment Recommendations:         It is my recommendation that this patient seek advice from their Primary Care Provider about active symptoms not addressed during this visit. The risks and benefits of acupuncture were reviewed and the patient stated understanding. The patient's questions were answered to their satisfaction. Consent was provided for treatment. We thank you for the referral and opportunity to treat this patient.    Time Spent with Patient:   I spent a total of 30 minutes face-to-face with Nadira Perez during today's office visit.     Ponce Trejo

## 2022-10-07 ENCOUNTER — OFFICE VISIT (OUTPATIENT)
Dept: ORTHOPEDICS | Facility: CLINIC | Age: 72
End: 2022-10-07
Payer: COMMERCIAL

## 2022-10-07 DIAGNOSIS — M81.0 OSTEOPOROSIS, UNSPECIFIED OSTEOPOROSIS TYPE, UNSPECIFIED PATHOLOGICAL FRACTURE PRESENCE: Primary | ICD-10-CM

## 2022-10-07 PROCEDURE — 96372 THER/PROPH/DIAG INJ SC/IM: CPT | Performed by: FAMILY MEDICINE

## 2022-10-07 PROCEDURE — 99207 PR DROP WITH A PROCEDURE: CPT | Performed by: FAMILY MEDICINE

## 2022-10-07 NOTE — NURSING NOTE
[address] [address]  Dept: [phone]  ______________________________________________________________________________    Patient: Nadira Perez   : 1950   MRN: [id number]   2022    INVASIVE PROCEDURE SAFETY CHECKLIST    Date: 10/7/22   Procedure: Bilateral subcutaneous Evenity injections   Patient Name: Nadira Perez  MRN: [id number]  YOB: 1950    Action: Complete sections as appropriate. Any discrepancy results in a HARD COPY until resolved.     PRE PROCEDURE:  Patient ID verified with 2 identifiers (name and  or MRN): Yes  Procedure and site verified with patient/designee (when able): Yes  Accurate consent documentation in medical record: Yes  H&P (or appropriate assessment) documented in medical record: Yes  H&P must be up to 20 days prior to procedure and updates within 24 hours of procedure as applicable: NA  Relevant diagnostic and radiology test results appropriately labeled and displayed as applicable: Yes  Procedure site(s) marked with provider initials: NA    TIMEOUT:  Time-Out performed immediately prior to starting procedure, including verbal and active participation of all team members addressing the following:Yes  * Correct patient identify  * Confirmed that the correct side and site are marked  * An accurate procedure consent form  * Agreement on the procedure to be done  * Correct patient position  * Relevant images and results are properly labeled and appropriately displayed  * The need to administer antibiotics or fluids for irrigation purposes during the procedure as applicable   * Safety precautions based on patient history or medication use    DURING PROCEDURE: Verification of correct person, site, and procedures any time the responsibility for care of the patient is transferred to another member of the care team.       Prior to injection, verified patient identity using  patient's name and date of birth.  Due to injection administration, patient instructed to remain in clinic for 15 minutes  afterwards, and to report any adverse reaction to me immediately.    subcutaneous injections were performed    Drug Amount Wasted:  None.  Vial/Syringe: Single dose vial  Expiration Date:  9/1/24      Fausto Browning, [facility]  October 7, 2022

## 2022-10-07 NOTE — LETTER
10/7/2022      RE: Nadira Perez  [address]  [place] [facility] [place] [address]     Dear Colleague,    Thank you for referring your patient, Nadira Perez, to the [facility]. Please see a copy of my visit note below.      Assessment & Plan     Osteoporosis, unspecified osteoporosis type, unspecified pathological fracture presence  Evenity #6  - romosozumab-aqqg (EVENITY) injection 210 mg  - Creatinine; Future  - Calcium; Future    Prescription drug management             Return in about 4 weeks (around 11/4/2022), or if symptoms worsen or fail to improve.    Cortney Clark MD  [facility]    Tom Guevara is a 72 year old accompanied by her self, presenting for the following health issues:  Evenity 6/12      HPI     Pt is a 71 yo white female with PMhx of osteoporosis presenting for Evenity #6    Review of Systems   Constitutional, HEENT, cardiovascular, pulmonary, gi and gu systems are negative, except as otherwise noted.      Objective    There were no vitals taken for this visit.  There is no height or weight on file to calculate BMI.  Physical Exam   NAD  nonlabored breathing    Procedure: Some benefits discussed and patient was consented.  Alcohol used to clean the area of the skin and ethyl chloride to anesthetize the area.  1 injected each lateral thigh.  Band aids in place.  Well tolerated.            Again, thank you for allowing me to participate in the care of your patient.      Sincerely,    Cortney Clark MD

## 2022-10-07 NOTE — PROGRESS NOTES
Assessment & Plan     Osteoporosis, unspecified osteoporosis type, unspecified pathological fracture presence  Evenity #6  - romosozumab-aqqg (EVENITY) injection 210 mg  - Creatinine; Future  - Calcium; Future    Prescription drug management             Return in about 4 weeks (around 11/4/2022), or if symptoms worsen or fail to improve.    Cortney Clark MD  [facility] THIERRY Guevara is a 72 year old accompanied by her self, presenting for the following health issues:  Evenity 6/12      HPI     Pt is a 71 yo white female with PMhx of osteoporosis presenting for Evenity #6    Review of Systems   Constitutional, HEENT, cardiovascular, pulmonary, gi and gu systems are negative, except as otherwise noted.      Objective    There were no vitals taken for this visit.  There is no height or weight on file to calculate BMI.  Physical Exam   NAD  nonlabored breathing    Procedure: Some benefits discussed and patient was consented.  Alcohol used to clean the area of the skin and ethyl chloride to anesthetize the area.  1 injected each lateral thigh.  Band aids in place.  Well tolerated.

## 2022-10-11 ENCOUNTER — OFFICE VISIT (OUTPATIENT)
Dept: OPHTHALMOLOGY | Facility: CLINIC | Age: 72
End: 2022-10-11
Attending: NURSE PRACTITIONER
Payer: COMMERCIAL

## 2022-10-11 DIAGNOSIS — H52.13 MYOPIA OF BOTH EYES: ICD-10-CM

## 2022-10-11 DIAGNOSIS — H35.372 EPIRETINAL MEMBRANE (ERM) OF LEFT EYE: ICD-10-CM

## 2022-10-11 DIAGNOSIS — Z96.1 PSEUDOPHAKIA OF BOTH EYES: Primary | ICD-10-CM

## 2022-10-11 DIAGNOSIS — H43.812 PVD (POSTERIOR VITREOUS DETACHMENT), LEFT EYE: ICD-10-CM

## 2022-10-11 DIAGNOSIS — H43.392 VITREOUS OPACITIES OF LEFT EYE: ICD-10-CM

## 2022-10-11 PROCEDURE — G0463 HOSPITAL OUTPT CLINIC VISIT: HCPCS

## 2022-10-11 PROCEDURE — 92014 COMPRE OPH EXAM EST PT 1/>: CPT | Performed by: OPHTHALMOLOGY

## 2022-10-11 ASSESSMENT — CONF VISUAL FIELD
OD_INFERIOR_TEMPORAL_RESTRICTION: 0
METHOD: COUNTING FINGERS
OS_NORMAL: 1
OD_NORMAL: 1
OD_SUPERIOR_TEMPORAL_RESTRICTION: 0
OD_SUPERIOR_NASAL_RESTRICTION: 0
OS_INFERIOR_NASAL_RESTRICTION: 0
OS_SUPERIOR_TEMPORAL_RESTRICTION: 0
OS_INFERIOR_TEMPORAL_RESTRICTION: 0
OD_INFERIOR_NASAL_RESTRICTION: 0
OS_SUPERIOR_NASAL_RESTRICTION: 0

## 2022-10-11 ASSESSMENT — EXTERNAL EXAM - LEFT EYE: OS_EXAM: NORMAL

## 2022-10-11 ASSESSMENT — TONOMETRY
OS_IOP_MMHG: 19
IOP_METHOD: TONOPEN
IOP_METHOD: APPLANATION
OS_IOP_MMHG: 20
OD_IOP_MMHG: 19
OD_IOP_MMHG: 19

## 2022-10-11 ASSESSMENT — VISUAL ACUITY
METHOD: SNELLEN - LINEAR
OS_PH_CC: 20/30
OS_PH_CC+: +2
OS_CC: 20/40
OD_CC: 20/25

## 2022-10-11 ASSESSMENT — REFRACTION_WEARINGRX
OS_CYLINDER: +1.50
OS_SPHERE: PLANO
OD_SPHERE: -0.25
SPECS_TYPE: PAL
OS_AXIS: 036
OD_AXIS: 160
OD_CYLINDER: +1.00

## 2022-10-11 ASSESSMENT — SLIT LAMP EXAM - LIDS
COMMENTS: NORMAL
COMMENTS: NORMAL

## 2022-10-11 ASSESSMENT — EXTERNAL EXAM - RIGHT EYE: OD_EXAM: NORMAL

## 2022-10-11 NOTE — NURSING NOTE
Chief Complaints and History of Present Illnesses   Patient presents with     Pseudophakia Follow Up     3 month follow up for Pseudophakia of both eyes     Chief Complaint(s) and History of Present Illness(es)     Pseudophakia Follow Up            Laterality: both eyes    Comments: 3 month follow up for Pseudophakia of both eyes          Comments    Stable vision since last visit.  No flashes or floaters since last seen.  No eye pain. Pt does get dry eyes, relief with Systane drops.  No DM.    Phillip Madison COA.date 10:49 AM   MIA Hale October 11, 2022 11:11 AM

## 2022-10-11 NOTE — PROGRESS NOTES
Doing well; very satisfied with vision   Glasses is different from her prescription given last visit      ASSESSMENT & PLAN    # S/p PPV25+EL left eye  on 6/20/22 for floaters  - doing great: observe    # Large cup:disc ratio w/ possible temporal thinning OS  -  IOP 19/20; screening for glaucoma next visit with OCT RNFL and IOP    # pseudophakia each eye  Observe    Follow up:  - 6-8 months for V/T/D and OCT RNFL       Complete documentation of historical and exam elements from today's encounter can be found in the full encounter summary report (not reduplicated in this progress note). I personally obtained the chief complaint(s) and history of present illness.  I confirmed and edited as necessary the review of systems, past medical/surgical history, family history, social history, and examination findings as documented by others; and I examined the patient myself. I personally reviewed the relevant tests, images, and reports as documented above. I formulated and edited as necessary the assessment and plan and discussed the findings and management plan with the patient and family.    Felix Oliveros MD, PhD

## 2022-10-24 ENCOUNTER — OFFICE VISIT (OUTPATIENT)
Dept: PALLIATIVE MEDICINE | Facility: OTHER | Age: 72
End: 2022-10-24
Payer: COMMERCIAL

## 2022-10-24 DIAGNOSIS — Z98.1 S/P SPINAL FUSION: Primary | ICD-10-CM

## 2022-10-24 DIAGNOSIS — M54.50 CHRONIC BILATERAL LOW BACK PAIN, UNSPECIFIED WHETHER SCIATICA PRESENT: ICD-10-CM

## 2022-10-24 DIAGNOSIS — G89.29 CHRONIC BILATERAL LOW BACK PAIN, UNSPECIFIED WHETHER SCIATICA PRESENT: ICD-10-CM

## 2022-10-24 DIAGNOSIS — M54.59 OTHER LOW BACK PAIN: ICD-10-CM

## 2022-10-24 PROCEDURE — 97811 ACUP 1/> W/O ESTIM EA ADD 15: CPT | Performed by: ACUPUNCTURIST

## 2022-10-24 PROCEDURE — 97810 ACUP 1/> WO ESTIM 1ST 15 MIN: CPT | Performed by: ACUPUNCTURIST

## 2022-10-24 NOTE — PROGRESS NOTES
ACUPUNCTURIST TREATMENT NOTE      Nadira Perez, a 72 year old female, is here today for Follow - Up exam. Patient is referred by Ariannaiwe.    HPI  Main Complaint: Chronic low back pain and pain at (L) SI joint  Patient denies any significant improvement at this point from the acupuncture.  Today she reports pain in her low back and (L) buttocks at SI joint.  She reports paraesthesia at lateral lower legs bilateral.  She denies any radiating pain into her legs.     Past Medical History  Past Medical History Reviewed: Yes   has a past medical history of Arthritis, Bone disease, Breast cancer (H), H/O kyphoplasty, Hearing problem, History of kidney stones, History of radiation therapy, Hyperlipemia, Hypertension, Hypopotassemia, Kidney problem, Lymph edema, Medullary sponge kidney, Osteopenia, PONV (postoperative nausea and vomiting), Reduced vision, Squamous cell skin cancer, and Thyroid disease.    Objective  Basic Exam Completed:   No    TCM Exam Completed: Yes   Limbs/Back: Lower Back    Tongue/Pulse Exam Completed: Yes           Patient Assessment  Patient Type: Pain  Patient Complaint: Chronic low back pain; pain at (L) SI joint    Acupuncture 10/4/2022 10/24/2022   Intervention Reason Pain Pain   Pain Location low back low back   Pre-session Pain Rating 6 5   Post-session Pain Rating 4 -       TCM Diagnosis: Qi Stagnation;Blood Stagnation;Kidney Qi Deficiency      Treatment Principle: channel obstruction of Sreekanth Colon and Billy Colon; heart fire    TCM / Acupuncture Treatment  Acupuncture Points:       Initial insertions: HTJJ L4 and L5; Raj Tomas, quadratus lumborum MP, glute medius MP       Second insertions: Du 20, scalp x4, GB 34, GB 39, BL 60, GB 40, GB 41                    Accessory Techniques 10/4/2022 10/24/2022   Accessory Techniques TDP Heat Lamp TDP Heat Lamp   TDP Heat Lamp location used over low back over low back     Oswestry Disability Index (JOHN   Nish Weeks 1980, All rights reserved) 7/25/2022  8/8/2022   Section 1 - Pain intensity The pain is moderate at the moment. The pain is moderate at the moment.   Section 2 - Personal care (washing, dressing, etc.)  I need some help but manage most of my personal care. I need some help but manage most of my personal care.   Section 3 - Lifting I can only lift very light weights. I can only lift very light weights.   Section 4 - Walking I can only walk using a cane or crutches. I can only walk using a cane or crutches.   Section 5 - Sitting Pain prevents me from sitting for more than half an hour. Pain prevents me from sitting for more than half an hour.   Section 6 - Standing Pain prevents me from standing for more than 10 minutes. Pain prevents me from standing for more than 10 minutes.   Section 7 - Sleeping Because of pain I have less than 4 hours sleep. Because of pain I have less than 4 hours sleep.   Section 8 - Sex life (if applicable) Not answered/NA Not answered/NA   Section 9 - Social life Pain has restricted my social life and I do not go out as often. Pain has restricted my social life and I do not go out as often.   Section 10 - Traveling I can travel anywhere without pain. I can travel anywhere without pain.   Oswestry Score (%) 57.78 57.78       Assessment and Plan  Treatment Observations: Pt. rested comfortably  Acupuncture Treatment Recommendations:         It is my recommendation that this patient seek advice from their Primary Care Provider about active symptoms not addressed during this visit. The risks and benefits of acupuncture were reviewed and the patient stated understanding. The patient's questions were answered to their satisfaction. Consent was provided for treatment. We thank you for the referral and opportunity to treat this patient.    Time Spent with Patient:   I spent a total of 30 minutes face-to-face with Nadira Perez during today's office visit.     Daphne Kothari L.Ac.

## 2022-10-27 ENCOUNTER — OFFICE VISIT (OUTPATIENT)
Dept: INTERNAL MEDICINE | Facility: CLINIC | Age: 72
End: 2022-10-27
Payer: COMMERCIAL

## 2022-10-27 ENCOUNTER — LAB (OUTPATIENT)
Dept: LAB | Facility: CLINIC | Age: 72
End: 2022-10-27
Payer: COMMERCIAL

## 2022-10-27 VITALS
SYSTOLIC BLOOD PRESSURE: 130 MMHG | HEART RATE: 76 BPM | RESPIRATION RATE: 16 BRPM | TEMPERATURE: 98.4 F | HEIGHT: 67 IN | WEIGHT: 184.3 LBS | DIASTOLIC BLOOD PRESSURE: 74 MMHG | OXYGEN SATURATION: 98 % | BODY MASS INDEX: 28.93 KG/M2

## 2022-10-27 DIAGNOSIS — E03.9 HYPOTHYROIDISM, UNSPECIFIED TYPE: ICD-10-CM

## 2022-10-27 DIAGNOSIS — Z13.0 SCREENING FOR DEFICIENCY ANEMIA: ICD-10-CM

## 2022-10-27 DIAGNOSIS — E03.8 OTHER SPECIFIED HYPOTHYROIDISM: ICD-10-CM

## 2022-10-27 DIAGNOSIS — M81.0 OSTEOPOROSIS, UNSPECIFIED OSTEOPOROSIS TYPE, UNSPECIFIED PATHOLOGICAL FRACTURE PRESENCE: ICD-10-CM

## 2022-10-27 DIAGNOSIS — I10 BENIGN ESSENTIAL HYPERTENSION: ICD-10-CM

## 2022-10-27 DIAGNOSIS — I10 ESSENTIAL HYPERTENSION, BENIGN: ICD-10-CM

## 2022-10-27 DIAGNOSIS — E87.6 HYPOPOTASSEMIA: ICD-10-CM

## 2022-10-27 DIAGNOSIS — M48.062 SPINAL STENOSIS OF LUMBAR REGION WITH NEUROGENIC CLAUDICATION: ICD-10-CM

## 2022-10-27 DIAGNOSIS — E55.9 HYPOVITAMINOSIS D: ICD-10-CM

## 2022-10-27 DIAGNOSIS — R73.09 ELEVATED HEMOGLOBIN A1C: Primary | ICD-10-CM

## 2022-10-27 DIAGNOSIS — R73.09 ELEVATED HEMOGLOBIN A1C: ICD-10-CM

## 2022-10-27 DIAGNOSIS — E78.00 PURE HYPERCHOLESTEROLEMIA: ICD-10-CM

## 2022-10-27 LAB
ALBUMIN SERPL BCG-MCNC: 4.6 G/DL (ref 3.5–5.2)
ALP SERPL-CCNC: 96 U/L (ref 35–104)
ALT SERPL W P-5'-P-CCNC: 16 U/L (ref 10–35)
ANION GAP SERPL CALCULATED.3IONS-SCNC: 13 MMOL/L (ref 7–15)
AST SERPL W P-5'-P-CCNC: 22 U/L (ref 10–35)
BILIRUB SERPL-MCNC: 0.2 MG/DL
BUN SERPL-MCNC: 19 MG/DL (ref 8–23)
CALCIUM SERPL-MCNC: 9.4 MG/DL (ref 8.8–10.2)
CALCIUM SERPL-MCNC: 9.4 MG/DL (ref 8.8–10.2)
CHLORIDE SERPL-SCNC: 105 MMOL/L (ref 98–107)
CREAT SERPL-MCNC: 0.76 MG/DL (ref 0.51–0.95)
DEPRECATED CALCIDIOL+CALCIFEROL SERPL-MC: 56 UG/L (ref 20–75)
DEPRECATED HCO3 PLAS-SCNC: 22 MMOL/L (ref 22–29)
ERYTHROCYTE [DISTWIDTH] IN BLOOD BY AUTOMATED COUNT: 13.8 % (ref 10–15)
GFR SERPL CREATININE-BSD FRML MDRD: 84 ML/MIN/1.73M2
GLUCOSE SERPL-MCNC: 104 MG/DL (ref 70–99)
HBA1C MFR BLD: 5.7 %
HCT VFR BLD AUTO: 37.3 % (ref 35–47)
HGB BLD-MCNC: 11.9 G/DL (ref 11.7–15.7)
MCH RBC QN AUTO: 28.7 PG (ref 26.5–33)
MCHC RBC AUTO-ENTMCNC: 31.9 G/DL (ref 31.5–36.5)
MCV RBC AUTO: 90 FL (ref 78–100)
PLATELET # BLD AUTO: 313 10E3/UL (ref 150–450)
POTASSIUM SERPL-SCNC: 4.4 MMOL/L (ref 3.4–5.3)
PROT SERPL-MCNC: 7.1 G/DL (ref 6.4–8.3)
RBC # BLD AUTO: 4.15 10E6/UL (ref 3.8–5.2)
SODIUM SERPL-SCNC: 140 MMOL/L (ref 136–145)
TSH SERPL DL<=0.005 MIU/L-ACNC: 2.74 UIU/ML (ref 0.3–4.2)
WBC # BLD AUTO: 7.8 10E3/UL (ref 4–11)

## 2022-10-27 PROCEDURE — 84443 ASSAY THYROID STIM HORMONE: CPT | Performed by: PATHOLOGY

## 2022-10-27 PROCEDURE — 80053 COMPREHEN METABOLIC PANEL: CPT | Performed by: PATHOLOGY

## 2022-10-27 PROCEDURE — G0439 PPPS, SUBSEQ VISIT: HCPCS | Performed by: NURSE PRACTITIONER

## 2022-10-27 PROCEDURE — 36415 COLL VENOUS BLD VENIPUNCTURE: CPT | Performed by: PATHOLOGY

## 2022-10-27 PROCEDURE — 82306 VITAMIN D 25 HYDROXY: CPT | Performed by: PATHOLOGY

## 2022-10-27 PROCEDURE — 83036 HEMOGLOBIN GLYCOSYLATED A1C: CPT | Performed by: PATHOLOGY

## 2022-10-27 PROCEDURE — 85027 COMPLETE CBC AUTOMATED: CPT | Performed by: PATHOLOGY

## 2022-10-27 RX ORDER — LISINOPRIL 40 MG/1
40 TABLET ORAL DAILY
Qty: 90 TABLET | Refills: 3 | Status: SHIPPED | OUTPATIENT
Start: 2022-10-27 | End: 2023-11-30

## 2022-10-27 RX ORDER — ATORVASTATIN CALCIUM 80 MG/1
80 TABLET, FILM COATED ORAL DAILY
Qty: 90 TABLET | Refills: 3 | Status: SHIPPED | OUTPATIENT
Start: 2022-10-27 | End: 2023-10-20

## 2022-10-27 RX ORDER — LEVOTHYROXINE SODIUM 112 UG/1
TABLET ORAL
Qty: 45 TABLET | Refills: 3 | Status: SHIPPED | OUTPATIENT
Start: 2022-10-27 | End: 2023-10-20

## 2022-10-27 RX ORDER — METHOCARBAMOL 500 MG/1
500 TABLET, FILM COATED ORAL 3 TIMES DAILY
Qty: 40 TABLET | Refills: 4 | Status: SHIPPED | OUTPATIENT
Start: 2022-10-27 | End: 2024-02-16

## 2022-10-27 RX ORDER — AMLODIPINE BESYLATE 10 MG/1
10 TABLET ORAL DAILY
Qty: 90 TABLET | Refills: 3 | Status: SHIPPED | OUTPATIENT
Start: 2022-10-27 | End: 2023-11-06

## 2022-10-27 RX ORDER — SPIRONOLACTONE 25 MG/1
50 TABLET ORAL DAILY
Qty: 180 TABLET | Refills: 3 | Status: SHIPPED | OUTPATIENT
Start: 2022-10-27 | End: 2023-08-18

## 2022-10-27 NOTE — NURSING NOTE
"Nadira Perez is a 72 year old female patient that presents today in clinic for the following:    Chief Complaint   Patient presents with     Physical     Annual Visit     Blood work     Pharyngitis     The patient's allergies and medications were reviewed as noted. A set of vitals were recorded as noted without incident: /74 (BP Location: Right arm, Patient Position: Sitting, Cuff Size: Adult Regular)   Pulse 76   Temp 98.4  F (36.9  C) (Oral)   Resp 16   Ht 1.702 m (5' 7\")   Wt 83.6 kg (184 lb 4.8 oz)   SpO2 98%   Breastfeeding No   BMI 28.87 kg/m  . The patient does not have any other questions for the provider.    Moose Marcano, EMT at 12:44 PM on 10/27/2022  "

## 2022-10-31 ASSESSMENT — ENCOUNTER SYMPTOMS
SYNCOPE: 0
ORTHOPNEA: 0
CONSTIPATION: 0
NECK MASS: 0
SEIZURES: 0
BACK PAIN: 1
LEG PAIN: 1
VOMITING: 0
SPUTUM PRODUCTION: 0
PARALYSIS: 0
SINUS CONGESTION: 0
DIZZINESS: 1
JAUNDICE: 0
LIGHT-HEADEDNESS: 1
TASTE DISTURBANCE: 0
WEAKNESS: 1
STIFFNESS: 1
RECTAL PAIN: 0
EYE IRRITATION: 0
SORE THROAT: 0
DIARRHEA: 0
NUMBNESS: 1
DYSURIA: 0
FLANK PAIN: 0
HYPERTENSION: 1
NECK PAIN: 1
BOWEL INCONTINENCE: 0
HOARSE VOICE: 0
SNORES LOUDLY: 1
BLOOD IN STOOL: 0
PALPITATIONS: 1
DYSPNEA ON EXERTION: 0
EXERCISE INTOLERANCE: 0
TINGLING: 1
MUSCLE CRAMPS: 1
SINUS PAIN: 0
COUGH DISTURBING SLEEP: 0
HYPOTENSION: 0
MUSCLE WEAKNESS: 1
MEMORY LOSS: 0
ARTHRALGIAS: 1
NAUSEA: 0
TREMORS: 0
EYE PAIN: 0
DOUBLE VISION: 0
MYALGIAS: 1
HEMOPTYSIS: 0
ABDOMINAL PAIN: 1
TROUBLE SWALLOWING: 1
WHEEZING: 0
BLOATING: 0
JOINT SWELLING: 1
SMELL DISTURBANCE: 0
EYE WATERING: 0
DECREASED LIBIDO: 1
HEMATURIA: 0
LOSS OF CONSCIOUSNESS: 0
COUGH: 0
SPEECH CHANGE: 0
DIFFICULTY URINATING: 1
SHORTNESS OF BREATH: 0
DISTURBANCES IN COORDINATION: 1
HEARTBURN: 0
HEADACHES: 0
HOT FLASHES: 0
EYE REDNESS: 1
POSTURAL DYSPNEA: 0
SLEEP DISTURBANCES DUE TO BREATHING: 0

## 2022-10-31 NOTE — PROGRESS NOTES
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   Colonoscopy due 12/2023.   Vaccines UTD.   She is receiving Evenity injections.     She has had two episodes of lightheadedness associated with low BP.   She fell in the [employer] parking lot and broke her right 2nd metatarsal and her right fibula and left 4th finger and thumb.     She had a vitrectomy in June.     She has an appt with Renaldo, next month for a routine check.     She continues to have left lumbar pain exacerbated by walking 100 feet.  She exercises on her exer-cycle 3 times a week, PT 3 days a week for 20 minutes, arm exercises several times a week.     Past Medical History:   Diagnosis Date     Arthritis      Bone disease      Breast cancer (H)      H/O kyphoplasty      Hearing problem      History of  kidney stones      History of radiation therapy      Hyperlipemia      Hypertension      Hypopotassemia      Kidney problem      Lymph edema      Medullary sponge kidney      Osteopenia      PONV (postoperative nausea and vomiting)      Reduced vision      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     Thyroid disease             Past Surgical History:   Procedure Laterality Date     ABDOMEN SURGERY      ovarian cyst, mesh     ARTHRODESIS WRIST Right      ARTHRODESIS WRIST  02/14/2013    Procedure: ARTHRODESIS WRIST;  left wrist scaphoid excision, four bone fusion, iliac crest bone graft  ( Mac with block);  Surgeon: Av Mendez MD;  [facility]     BIOPSY      skin, vaginal     BLEPHAROPLASTY BILATERAL Bilateral 5/6/2022    Procedure: UPPER BLEPHAROPLASTY, BILATERAL;  Surgeon: Jemal Sanchez MD;  Location: [facility] OR     CATARACT IOL, RT/LT Right 03/13/2018     CATARACT IOL, RT/LT Left 02/20/2018     COLONOSCOPY  12/24/2013    Procedure: COMBINED COLONOSCOPY, SINGLE BIOPSY/POLYPECTOMY BY BIOPSY;  COLONOSCOPY;  Surgeon: Dom Alvarez MD;  Location:  GI     COSMETIC BLEPHAROPLASTY LOWER LIDS BILATERAL Bilateral 5/6/2022    Procedure: BLEPHAROPLASTY, LOWER EYELID, BILATERAL, COSMETIC;  Surgeon: Jemal Sanchez MD;  Location: [facility] OR     ESOPHAGOSCOPY, GASTROSCOPY, DUODENOSCOPY (EGD), COMBINED N/A 11/23/2016    Procedure: COMBINED ESOPHAGOSCOPY, GASTROSCOPY, DUODENOSCOPY (EGD);  Surgeon: Quinten Feliciano MD;  Location:  GI     EXTERNAL EAR SURGERY      right     EYE SURGERY      radial keratomy     FUSION, SPINE, INTERBODY, OBLIQUE ANTERIOR AND LUMBAR, 2 LEVELS, POST APPROACH, USING OTS N/A 11/18/2021    Procedure: Part 1: Oblique Anterior Interbody Fusion at Lumbar 5 to sacral 1 with use of Bone Morphogenic Protein,;  Surgeon: Serge Ramirez MD;  [facility]     GRAFT BONE FROM ILIAC CREST  02/14/2013    Procedure: GRAFT BONE FROM ILIAC CREST;  mac with block and local  infilitration;  Surgeon: Av Mendez MD;  [facility]      BREATH HYDROGEN TEST N/A 10/14/2016    Procedure: HYDROGEN BREATH TEST;  Surgeon: Cheri Barron MD;  [facility]: [facility] GI     HERNIA REPAIR      umbilical age 18 mos.     HYSTERECTOMY TOTAL ABDOMINAL  05/03/2000     MASTECTOMY MODIFIED RADICAL Bilateral     bilateral; right breast prophylactic     OPTICAL TRACKING SYSTEM FUSION POSTERIOR SPINE LUMBAR N/A 11/18/2021    Procedure: Open Posterior Instrumented Spinal Fusion at Lumbar 5 to Sacral 1, with grimm aguayo osteotomy, use of O-Arm/Stealth;  Surgeon: Serge Ramirez MD;  [facility]     PARATHYROIDECTOMY  09/23/2004    R SUPERIOR     PARATHYROIDECTOMY  09/23/2004    parathyroid resection, subtotal     RELEASE CARPAL TUNNEL Right 12/2/2021    Procedure: RIGHT CARPAL TUNNEL RELEASE, RIGHT WRIST HARDWARE REMOVAL, RIGHT CARPAL BOSS EXCISION;  Surgeon: Rolan Conley MD;  [facility]     REMOVE HARDWARE HAND  09/24/2013    Procedure: REMOVE HARDWARE HAND;  Left Hand Screw Removal        RHINOPLASTY  1968     thyr proc skin closed cosmetic manner by subcuticular stitch  01/23/2009     THYROPLASTY  10/09/2009     TONSILLECTOMY  1977     VITRECTOMY PARSPLANA WITH 25 GAUGE SYSTEM Left 6/20/2022    Procedure: LEFT EYE VITRECTOMY, PARS PLANA APPROACH, USING 25-GAUGE INSTRUMENTS, laser;  Surgeon: Felix Carlos MD;  [facility]     WRIST SURGERY      wrist arthrodesis            Social History     Tobacco Use     Smoking status: Never     Smokeless tobacco: Never   Substance Use Topics     Alcohol use: Not Currently     Alcohol/week: 7.0 standard drinks     Types: 7 Glasses of wine per week     Comment: Rare to occasional            Family History   Problem Relation Age of Onset     Neurologic Disorder Mother         Anuerysm of Cerebral Artery, Dementia     Diabetes Mother      Thyroid Disease Mother         ,     Cerebrovascular Disease Mother       Dementia Mother      Osteoporosis Mother      Heart Disease Father         AAA     Hypertension Father      Circulatory Brother         Perihperal Neurophathy     Diabetes Maternal Grandmother      Asthma Maternal Grandmother      Chronic Obstructive Pulmonary Disease Maternal Grandfather         father     Asthma Maternal Grandfather      Diabetes Maternal Aunt         x2     Melanoma Maternal Aunt      Glaucoma Maternal Aunt      Breast Cancer Cousin      Dementia Other      Cancer Other         malignant melanoma     Hypertension Other      Hypertension Other      Cerebrovascular Disease Other      Cerebrovascular Disease Other      Obesity Other      Respiratory Other      Cancer Other      Diabetes Other      Asthma Other      Macular Degeneration No family hx of      Coronary Artery Disease No family hx of      Hyperlipidemia No family hx of      Kidney Disease No family hx of      Thrombosis No family hx of      Arthritis No family hx of      Depression No family hx of      Mental Illness No family hx of      Substance Abuse No family hx of      Cystic Fibrosis No family hx of      Early Death No family hx of      Coronary Artery Disease Early Onset No family hx of      Heart Failure No family hx of      Bleeding Diathesis No family hx of      Ovarian Cancer No family hx of      Uterine Cancer No family hx of      Prostate Cancer No family hx of      Colorectal Cancer No family hx of      Pancreatic Cancer No family hx of      Lung Cancer No family hx of      Other Cancer No family hx of      Autoimmune Disease No family hx of      Unknown/Adopted No family hx of      Genetic Disorder No family hx of      Bleeding Disorder No family hx of      Clotting Disorder No family hx of      Anesthesia Reaction No family hx of                Allergies   Allergen Reactions     Erythromycin Nausea     Penicillins Hives     Around age 6 - doesn't recall full reaction, mother told her it was hives.     Has tolerated  cephalsporins.             Current Outpatient Medications   Medication Sig Dispense Refill     acetaminophen (TYLENOL) 500 MG tablet Take 500-1,000 mg by mouth every 6 hours as needed for mild pain       amLODIPine (NORVASC) 10 MG tablet Take 1 tablet (10 mg) by mouth daily 90 tablet 3     Ascorbic Acid (VITAMIN C) 500 MG CHEW Take 1 tablet by mouth daily       atorvastatin (LIPITOR) 80 MG tablet Take 1 tablet (80 mg) by mouth daily 90 tablet 3     Cholecalciferol (VITAMIN D3) 50 MCG (2000 UT) CAPS Take 6,000 Units by mouth daily 270 capsule 3     CRANBERRY EXTRACT PO Take 650 mg by mouth daily ~10 am  Takes 1       diclofenac (VOLTAREN) 1 % topical gel APPLY 4 GRAMS TO KNEES OR 2 GRAMS TO HANDS FOUR TIMES DAILY USING ENCLOSED DOSING CARD. 100 g 3     gabapentin (NEURONTIN) 300 MG capsule Take 3 capsules (900 mg) by mouth 3 times daily 270 capsule 11     HYDROcodone-acetaminophen (NORCO) 5-325 MG tablet Take 1 tablet by mouth every 6 hours as needed for severe pain 30 tablet 0     hydrOXYzine (VISTARIL) 25 MG capsule Take 1 capsule (25 mg) by mouth At Bedtime 90 capsule 3     ibuprofen (ADVIL/MOTRIN) 600 MG tablet Take 1 tablet (600 mg) by mouth every 6 hours as needed for other (mild and/or inflammatory pain) 30 tablet 0     levothyroxine (SYNTHROID/LEVOTHROID) 112 MCG tablet TAKE 1/2 TABLET BY MOUTH DAILY 45 tablet 3     lisinopril (ZESTRIL) 40 MG tablet Take 1 tablet (40 mg) by mouth daily 90 tablet 3     melatonin 5 MG tablet Take 10 mg by mouth At Bedtime        methocarbamol (ROBAXIN) 500 MG tablet Take 1 tablet (500 mg) by mouth 3 times daily 40 tablet 4     metoprolol succinate ER (TOPROL-XL) 50 MG 24 hr tablet Take 1 tablet (50 mg) by mouth daily 90 tablet 3     Multiple Vitamin (MULTI-VITAMIN) per tablet Take 1 tablet by mouth daily       naloxone (NARCAN) 4 MG/0.1ML nasal spray Spray 1 spray (4 mg) into one nostril alternating nostrils once as needed for opioid reversal every 2-3 minutes until assistance  "arrives 0.2 mL 0     ondansetron (ZOFRAN-ODT) 4 MG ODT tab Take 1 tablet (4 mg) by mouth every 12 hours as needed for nausea 180 tablet 3     senna-docusate (SENOKOT-S/PERICOLACE) 8.6-50 MG tablet Take 2 tablets by mouth 2 times daily 30 tablet 0     spironolactone (ALDACTONE) 25 MG tablet Take 2 tablets (50 mg) by mouth daily 180 tablet 3     Vaginal Lubricant (REPHRESH) GEL Place 2 g vaginally every 3 days 2 g 3       REVIEW OF SYSTEMS:  See above.    O:   /74 (BP Location: Right arm, Patient Position: Sitting, Cuff Size: Adult Regular)   Pulse 76   Temp 98.4  F (36.9  C) (Oral)   Resp 16   Ht 1.702 m (5' 7\")   Wt 83.6 kg (184 lb 4.8 oz)   SpO2 98%   Breastfeeding No   BMI 28.87 kg/m     Her weight is up 15 pounds since 9/15/22.   GENERAL APPEARANCE: healthy, alert and no distress  EYES: EOMI,  PERRL. Glasses  HENT: ear canals and TM's normal and mouth without ulcers or lesions.   RESP: lungs clear to auscultation - no rales, rhonchi or wheezes  CV: regular rates and rhythm, normal S1 S2, no S3 or S4 and no murmur, click or rub   ABDOMEN:  soft, nontender, no HSM or masses and bowel sounds normal  NEURO: Grossly normal  MUSK: ambulatory with walker.   BREASTS: surgically absent bilaterally.  SKIN: normal. Surgical scan over anterior throat. and chest due to breast surgeries.  LYMPH: neg cervical, supra and infraclavicular nodes.  EXT: warm.  Edema:trace  PSYCHE: normal.    A/P:  Nadira was seen today for physical, annual visit, blood work and pharyngitis.    Diagnoses and all orders for this visit:    Elevated hemoglobin A1c  -     Hemoglobin A1c; Future    Essential hypertension, benign  -     Comprehensive metabolic panel; Future  -     spironolactone (ALDACTONE) 25 MG tablet; Take 2 tablets (50 mg) by mouth daily    Other specified hypothyroidism  -     TSH; Future    Hypopotassemia  -     Comprehensive metabolic panel; Future    Hypovitaminosis D  -     Vitamin D Deficiency; " Future    Screening for deficiency anemia  -     CBC with platelets; Future    Benign essential hypertension  -     amLODIPine (NORVASC) 10 MG tablet; Take 1 tablet (10 mg) by mouth daily  -     lisinopril (ZESTRIL) 40 MG tablet; Take 1 tablet (40 mg) by mouth daily    Pure hypercholesterolemia  -     atorvastatin (LIPITOR) 80 MG tablet; Take 1 tablet (80 mg) by mouth daily    Hypothyroidism, unspecified type  -     levothyroxine (SYNTHROID/LEVOTHROID) 112 MCG tablet; TAKE 1/2 TABLET BY MOUTH DAILY    Spinal stenosis of lumbar region with neurogenic claudication  -     methocarbamol (ROBAXIN) 500 MG tablet; Take 1 tablet (500 mg) by mouth 3 times daily      The patient voiced understanding of the information discussed and all questions were answered.     I spent a total of 50  minutes in the care of this pt during today's office visit. This time includes reviewing the patient's chart and prior history, obtaining a history, performing an examination and evaluation and counseling the patient. This time also includes ordering medications or tests necessary in addition to communication to other member's of the patient's health care team. Time spent in documentation and care coordination is included.     Matilde LONG, CNP

## 2022-11-01 ENCOUNTER — OFFICE VISIT (OUTPATIENT)
Dept: PALLIATIVE MEDICINE | Facility: OTHER | Age: 72
End: 2022-11-01
Payer: COMMERCIAL

## 2022-11-01 ENCOUNTER — OFFICE VISIT (OUTPATIENT)
Dept: DERMATOLOGY | Facility: CLINIC | Age: 72
End: 2022-11-01
Payer: COMMERCIAL

## 2022-11-01 DIAGNOSIS — M54.59 OTHER LOW BACK PAIN: ICD-10-CM

## 2022-11-01 DIAGNOSIS — D49.2 NEOPLASM OF UNSPECIFIED BEHAVIOR OF BONE, SOFT TISSUE, AND SKIN: ICD-10-CM

## 2022-11-01 DIAGNOSIS — G89.29 CHRONIC LOW BACK PAIN: ICD-10-CM

## 2022-11-01 DIAGNOSIS — Z98.1 S/P SPINAL FUSION: Primary | ICD-10-CM

## 2022-11-01 DIAGNOSIS — L81.4 SOLAR LENTIGO: ICD-10-CM

## 2022-11-01 DIAGNOSIS — D18.01 CHERRY ANGIOMA: ICD-10-CM

## 2022-11-01 DIAGNOSIS — L82.1 SEBORRHEIC KERATOSIS: ICD-10-CM

## 2022-11-01 DIAGNOSIS — Z85.828 HISTORY OF NONMELANOMA SKIN CANCER: ICD-10-CM

## 2022-11-01 DIAGNOSIS — M54.50 CHRONIC LOW BACK PAIN: ICD-10-CM

## 2022-11-01 DIAGNOSIS — L82.0 INFLAMED SEBORRHEIC KERATOSIS: ICD-10-CM

## 2022-11-01 DIAGNOSIS — D22.9 MULTIPLE BENIGN MELANOCYTIC NEVI: Primary | ICD-10-CM

## 2022-11-01 PROCEDURE — 17110 DESTRUCTION B9 LES UP TO 14: CPT | Performed by: DERMATOLOGY

## 2022-11-01 PROCEDURE — 88305 TISSUE EXAM BY PATHOLOGIST: CPT | Mod: 26 | Performed by: DERMATOLOGY

## 2022-11-01 PROCEDURE — 11103 TANGNTL BX SKIN EA SEP/ADDL: CPT | Mod: XS | Performed by: DERMATOLOGY

## 2022-11-01 PROCEDURE — 97811 ACUP 1/> W/O ESTIM EA ADD 15: CPT | Performed by: ACUPUNCTURIST

## 2022-11-01 PROCEDURE — 11104 PUNCH BX SKIN SINGLE LESION: CPT | Mod: XS | Performed by: DERMATOLOGY

## 2022-11-01 PROCEDURE — 88304 TISSUE EXAM BY PATHOLOGIST: CPT | Mod: TC | Performed by: DERMATOLOGY

## 2022-11-01 PROCEDURE — 99213 OFFICE O/P EST LOW 20 MIN: CPT | Mod: 25 | Performed by: DERMATOLOGY

## 2022-11-01 PROCEDURE — 97810 ACUP 1/> WO ESTIM 1ST 15 MIN: CPT | Performed by: ACUPUNCTURIST

## 2022-11-01 PROCEDURE — 88304 TISSUE EXAM BY PATHOLOGIST: CPT | Mod: 26 | Performed by: DERMATOLOGY

## 2022-11-01 ASSESSMENT — PAIN SCALES - GENERAL: PAINLEVEL: NO PAIN (0)

## 2022-11-01 NOTE — PROGRESS NOTES
Medicare Annual Wellness Questionnaire  This 72 year old year old female presents for a Medicare Wellness Exam.    The following is Nadira Perez's care team:  Patient Care Team       Relationship Specialty Notifications Start End    Matilde Quiñonez APRN CNP PCP - General   10/8/12     Phone: [phone] Fax: [phone]         [address] [facility] [address] [facility] [address]    Live Barrett DPM  Podiatry  4/6/15     Phone: [phone] Fax: [phone]         [address] [place] [address]    Matilde Quiñonez APRN CNP Referring Physician Nurse Practitioner  9/29/16     Phone: [phone] Fax: [phone]         [address] [facility] [address] [place] [address]    Guru Kely Barrera MD MD Gastroenterology  9/29/16     Phone: [phone] Fax: [phone]         [address] [place] [address]    Lien Prjaapati MD MD Orthopedics  12/1/16     Phone: [phone] Fax: [phone]         [address] [place] [address]    Abe Wilkerson MD MD Dermatology  10/5/18     Phone: [phone] Fax: [phone]         [address] [place] [address]    Kisha Heller [facility] Pharmacist Pharmacist Clinician- Clinical Pharmacy Specialist  10/5/18     Phone: [phone]          [facility] [address] [facility] [address]    Khushbu Louise MD MD Breast Oncology Admissions 12/26/19     Phone: [phone] Fax: [phone]         [address] [place] [address]    Sandra Jara, RN Specialty Care Coordinator Breast Oncology Admissions 12/26/19     Phone: [phone] Pager: [phone]        Khushbu Louise MD Assigned Cancer Care Provider   10/23/20     Phone: [phone] Fax: [phone] [facility] [address] [facility] [address]    Matilde Quiñonez APRN CNP Assigned PCP   11/15/20     Phone: [phone] Fax: [phone]         [address] [facility] [address] [facility] [address]    Willis So RP  Pharmacist   9/16/21     Phone: [phone] Fax: [phone]         [address] [place] [address]    Felix Carlos MD Assigned Surgical Provider   10/10/21     Phone: [phone] Fax: [phone]         [address] [place] [address]    Live Arias MD Assigned Neuroscience Provider   10/31/21     Phone: [phone] Fax: [phone]         [address] [place] [address]    Becca Moreno MD Assigned Heart and Vascular Provider   11/21/21     Phone: [phone] Fax: [phone]         [address] [place] [address]    Rolan Morgan DO MD Sports Medicine  1/18/22     Phone: [phone] Fax: [phone]         [address] [place] [address]    Jemal Sanchez MD MD [facility]  3/11/22     Phone: [phone] Fax: [phone]         [address] [place] [address]    Serge Ramirez MD Assigned Musculoskeletal Provider   7/30/22     Phone: [phone] Fax: [phone]         [address] [facility] [address]    Willis So [facility] Assigned MTM Pharmacist   9/28/22     Phone: [phone] Fax: [phone]         [address] [place] [address]          Fall Risk Assessment:    Have you fallen 2 or more times in the last year? Yes     How many times were you injured due to a fall in the last year? 4    PHQ-2:    Over the last 2 weeks, how often have you been bothered by feeling down, depressed, or hopeless? Not at all (0)    Over the last 2 weeks, how often have you had little interest or pleasure in doing things? Not at all (0)    Social History:    What is your marital status?     Who lives in your household? Part-time roommate    Does your home have loose rugs in the hallway: No    Does your home have grab bars in the bathroom: No    Does your home have handrails on the stairs? Yes     Does your home have poorly lit areas? No    Do you feel threatened or controlled by a partner, ex-partner or anyone in your  life? No    Has anyone hurt you physically, for example by pushing, hitting, slapping or kicking you or forcing you to have sex? No    Do you need help with the phone, transportation, shopping, preparing meals, housework, laundry, medications or managing money? Yes, housework & laundry    Sexual Health:    Are you sexually active? No    Have you had any sexually transmitted infections in the last year? Patient did not answer this question.    Do you have any sexual concerns? Patient did not answer this question.    Women: What year did you stop having periods (approximate age)? 2000    Women: Any vaginal bleeding in the last year? No    Women: Have you ever had an abnormal Pap smear? No    General Health Assessment:    Have you noticed any hearing difficulties? Yes     Do you wear hearing aids? Yes    Have you seen a hearing professional such as an audiologist in the last 1 year? No    Do you have vision difficulty? Yes     Do you wear glasses or contacts? Yes     Have you seen an eye doctor in the last 1 year? Yes     How many servings of fruits and vegetables do you eat a day? 6    How often do you exercise in a week? 3    How long and what kind of exercise do you do? Walking and cycling at the [facility]    Tobacco and Alcohol History:    Do you use tobacco/nicotine products? No    Do you use any other drugs? Yes, THC occasionally to help with pain management    Do you drink alcohol? Yes  o If you drink alcohol, how many drinks per week? 0-2    Advance Directive:    Have you completed an Advance Directives document? No  o If yes, have you given a copy to the clinic? No    Do you need information on Advance Directives? Yes    HARPER Guerrero  6:45 PM 11/1/2022

## 2022-11-01 NOTE — PATIENT INSTRUCTIONS
Cryotherapy    What is it?  Use of a very cold liquid, such as liquid nitrogen, to freeze and destroy abnormal skin cells that need to be removed    What should I expect?  Tenderness and redness  A small blister that might grow and fill with dark purple blood. There may be crusting.  More than one treatment may be needed if the lesions do not go away.    How do I care for the treated area?  Gently wash the area with your hands when bathing.  Use a thin layer of Vaseline to help with healing. You may use a Band-Aid.   The area should heal within 7-10 days and may leave behind a pink or lighter color.   Do not use an antibiotic or Neosporin ointment.   You may take acetaminophen (Tylenol) for pain.     Call your doctor if you have:  Severe pain  Signs of infection (warmth, redness, cloudy yellow drainage, and or a bad smell)  Questions or concerns    Who should I call with questions?      [facility]: [phone]      [facility]: [phone]      For urgent needs outside of business hours call the [facility] at [phone] and ask for the dermatology resident on call     Wound Care After a Biopsy    What is a skin biopsy?  A skin biopsy allows the doctor to examine a very small piece of tissue under the microscope to determine the diagnosis and the best treatment for the skin condition. A local anesthetic (numbing medicine)  is injected with a very small needle into the skin area to be tested. A small piece of skin is taken from the area. Sometimes a suture (stitch) is used.     What are the risks of a skin biopsy?  I will experience scar, bleeding, swelling, pain, crusting and redness. I may experience incomplete removal or recurrence. Risks of this procedure are excessive bleeding, bruising, infection, nerve damage, numbness, thick (hypertrophic or keloidal) scar and non-diagnostic biopsy.    How should I care for my wound for the first 24  hours?  Keep the wound dry and covered for 24 hours  If it bleeds, hold direct pressure on the area for 15 minutes. If bleeding does not stop then go to the emergency room  Avoid strenuous exercise the first 1-2 days or as your doctor instructs you    How should I care for the wound after 24 hours?  After 24 hours, remove the bandage  You may bathe or shower as normal  If you had a scalp biopsy, you can shampoo as usual and can use shower water to clean the biopsy site daily  Clean the wound twice a day with gentle soap and water  Do not scrub, be gentle  Apply white petroleum/Vaseline after cleaning the wound with a cotton swab or a clean finger, and keep the site covered with a Bandaid /bandage. Bandages are not necessary with a scalp biopsy  If you are unable to cover the site with a Bandaid /bandage, re-apply ointment 2-3 times a day to keep the site moist. Moisture will help with healing  Avoid strenuous activity for first 1-2 days  Avoid lakes, rivers, pools, and oceans until the stitches are removed or the site is healed    How do I clean my wound?  Wash hands thoroughly with soap or use hand  before all wound care  Clean the wound with gentle soap and water  Apply white petroleum/Vaseline  to wound after it is clean  Replace the Bandaid /bandage to keep the wound covered for the first few days or as instructed by your doctor  If you had a scalp biopsy, warm shower water to the area on a daily basis should suffice    What should I use to clean my wound?   Cotton-tipped applicators (Qtips )  White petroleum jelly (Vaseline ). Use a clean new container and use Q-tips to apply.  Bandaids   as needed  Gentle soap     How should I care for my wound long term?  Do not get your wound dirty  Keep up with wound care for one week or until the area is healed.  A small scab will form and fall off by itself when the area is completely healed. The area will be red and will become pink in color as it heals. Sun  protection is very important for how your scar will turn out. Sunscreen with an SPF 30 or greater is recommended once the area is healed.  If you have stitches, stitches need to be removed in 10-14 days. You may return to our clinic for this or you may have it done locally at your doctor s office.  You should have some soreness but it should be mild and slowly go away over several days. Talk to your doctor about using tylenol for pain,    When should I call my doctor?  If you have increased:   Pain or swelling  Pus or drainage (clear or slightly yellow drainage is ok)  Temperature over 100F  Spreading redness or warmth around wound    When will I hear about my results?  The biopsy results can take 2 weeks to come back.  Your results will automatically release to [facility] before your provider has even reviewed them.  The clinic will call you with the results, send you a [email or web address] message, or have you schedule a follow-up clinic or phone time to discuss the results.  Contact our clinics if you do not hear from us in 2 weeks.    Who should I call with questions?  [facility]: [phone]  [facility]: [phone]  For urgent needs outside of business hours call the [facility] at [phone] and ask for the dermatology resident on call

## 2022-11-01 NOTE — PROGRESS NOTES
[facility] Dermatology Note  Encounter Date: Nov 1, 2022  Office Visit     Dermatology Problem List:  FBSE 11/1/2022  #NUB, right shoulder (x1), right occipital scalp (x1) and central back (x1)  - s/p bx 11/1/2022  1. Hx SCC of the perineum, tx w/ excision and radiation in 1980  2. SK's. Multiple over chest back and face, and legs, s/p cryo  3. Tinea pedis w/ onychomycosis   - Terbinafine cream  4. Seborrheic dermatitis  - 2% ketoconazole shampoo, alternate with Head and Shoulders  5. Hand dermatitis, triamcinolone 0.1% cream PRN  6. Hx breast cancer    ____________________________________________    Assessment & Plan:  # NUB, right occipital scalp (x1), central back (x1), right shoulder (x1) - ddx folliculitis vs ISK  - s/p shave bx today for back and shoulder and punch bx for scalp. See procedure notes below.    # Hx NMSC, no evidence of recurrent disease   - Regular skin checks advised     # Inflamed seborrheic keratoses - right chest (x1), central chest (x1) and central abdomen (x1)  - Treated with cryo, see procedure note below     # Benign lesions: Multiple benign nevi, solar lentigos, seborrheic keratoses, cherry angiomas. Explained to patient benign nature of lesion. No treatment is necessary at this time unless the lesion changes or becomes symptomatic.   - Sun precaution was advised including the use of sun screens of SPF 30 or higher, sun protective clothing, and avoidance of tanning beds.    Procedures Performed:   - Shave biopsy procedure note, location(s): see above. After discussion of benefits and risks including but not limited to bleeding, infection, scar, incomplete removal, recurrence, and non-diagnostic biopsy, written consent and photographs were obtained. The area was cleaned with isopropyl alcohol. 0.5mL of 1% lidocaine with epinephrine was injected to obtain adequate anesthesia of lesion(s). Shave biopsy at site(s) performed. Hemostasis was achieved with aluminium  chloride. Petrolatum ointment and a sterile dressing were applied. The patient tolerated the procedure and no complications were noted. The patient was provided with verbal and written post care instructions.   - Cryotherapy procedure note, location(s): see above. After verbal consent and discussion of risks and benefits including, but not limited to, dyspigmentation/scar, blister, and pain, 3 lesion(s) was(were) treated with 1-2 mm freeze border for 1-2 cycles with liquid nitrogen. Post cryotherapy instructions were provided.  - Punch biopsy procedure note, location(s): see above. After discussion of benefits and risks including but not limited to bleeding, infection, scar, incomplete removal, recurrence, and non-diagnostic biopsy, written consent and photographs were obtained. The area was cleaned with isopropyl alcohol. 0.5mL of 1% lidocaine with epinephrine was injected to obtain adequate anesthesia and a 4 mm punch biopsy was performed at site(s). 4-0 Prolene sutures were utilized to approximate the epidermal edges. White petrolatum ointment and a bandage was applied to the wound. Explicit verbal and written wound care instructions were provided. The patient left the dermatology clinic in good condition.     Follow-up: 1 year(s) in-person pending path, or earlier for new or changing lesions    Staff and Scribe:     Scribe Disclosure:  I, Jacobo Mclaughlin, am serving as a scribe to document services personally performed by Abe Wilkerson MD based on data collection and the provider's statements to me.     Staff attestation:  The documentation recorded by the scribe accurately reflects the services I personally performed and the decisions I personally made. I have made edits where needed.    Abe Wilkerson MD  Staff Dermatologist and Dermatopathologist  , Department of Dermatology   ____________________________________________    CC: Skin Check (Annual FBSE.  Has a spot of concern on her right  shoulder, occasionally vang.)    HPI:  Ms. Nadira Perez is a(n) 72 year old female who presents today as a return patient for a FBSE. Last seen by me on 11/2/21, at which time patient underwent cryo for treatment of six inflamed seborrheic keratoses.    Today, patient has several inflamed seborrheic keratoses that are itchy and irritated that she would like removed.     Patient is otherwise feeling well, without additional skin concerns.    Labs Reviewed:  N/A    Physical Exam:  Vitals: There were no vitals taken for this visit.  SKIN: Total skin excluding the undergarment areas was performed. The exam included the head/face, neck, both arms, chest, back, abdomen, both legs, digits and/or nails.   - Right occipital scalp; 3 mm firm papule with nonspecific dermoscopy and slightly violaceous base  - Right shoulder; 4 mm flesh-colored papule  - Central mid-back; 3 mm light pink papule with erythematous base and O-shaped vessels on dermoscopy  - There are dome shaped bright red papules on the trunk and extremities.   - There is a firm tan/flesh colored papule that dimples with lateral pressure on the right distal thigh.  - Multiple regular brown pigmented macules and papules are identified on the trunk and extremities.   - Scattered brown macules on sun exposed areas.  - There are waxy stuck on tan to brown papules on the trunk and extremities.  - There are tan to brown waxy stuck on papules with surrounding erythema on the chest.   - No other lesions of concern on areas examined.     Medications:  Current Outpatient Medications   Medication     acetaminophen (TYLENOL) 500 MG tablet     amLODIPine (NORVASC) 10 MG tablet     Ascorbic Acid (VITAMIN C) 500 MG CHEW     atorvastatin (LIPITOR) 80 MG tablet     Cholecalciferol (VITAMIN D3) 50 MCG (2000 UT) CAPS     CRANBERRY EXTRACT PO     diclofenac (VOLTAREN) 1 % topical gel     gabapentin (NEURONTIN) 300 MG capsule     HYDROcodone-acetaminophen (NORCO) 5-325 MG  tablet     hydrOXYzine (VISTARIL) 25 MG capsule     ibuprofen (ADVIL/MOTRIN) 600 MG tablet     levothyroxine (SYNTHROID/LEVOTHROID) 112 MCG tablet     lisinopril (ZESTRIL) 40 MG tablet     melatonin 5 MG tablet     methocarbamol (ROBAXIN) 500 MG tablet     metoprolol succinate ER (TOPROL-XL) 50 MG 24 hr tablet     Multiple Vitamin (MULTI-VITAMIN) per tablet     naloxone (NARCAN) 4 MG/0.1ML nasal spray     ondansetron (ZOFRAN-ODT) 4 MG ODT tab     senna-docusate (SENOKOT-S/PERICOLACE) 8.6-50 MG tablet     spironolactone (ALDACTONE) 25 MG tablet     Vaginal Lubricant (REPHRESH) GEL     Current Facility-Administered Medications   Medication     dexamethasone (DECADRON) injection 1 mL     lidocaine 1 % injection 1 mL     lidocaine 1 % injection 1 mL     lidocaine 1 % injection 1 mL     lidocaine 1 % injection 1 mL     lidocaine 1 % injection 1 mL     romosozumab-aqqg (EVENITY) injection 210 mg     romosozumab-aqqg (EVENITY) injection 210 mg     romosozumab-aqqg (EVENITY) injection 210 mg     romosozumab-aqqg (EVENITY) injection 210 mg     romosozumab-aqqg (EVENITY) injection 210 mg     romosozumab-aqqg (EVENITY) injection 210 mg     triamcinolone (KENALOG-40) injection 40 mg     triamcinolone (KENALOG-40) injection 40 mg     triamcinolone (KENALOG-40) injection 40 mg     triamcinolone (KENALOG-40) injection 40 mg      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     Corneal opacity     Hypothyroidism      Osteopenia, unspecified location     Aromatase inhibitor use     Chronic pain of right knee     DDD (degenerative disc disease), lumbar     Degenerative scoliosis in adult patient     Peripheral neuropathy     Spinal stenosis of lumbar region with neurogenic claudication     Spondylolisthesis of lumbar region     Brain lesion     Dermatochalasis of both upper eyelids     S/P spinal fusion     Chronic bilateral low back pain     PVD (posterior vitreous detachment), left eye     Vitreous opacities of left eye     Past Medical History:   Diagnosis Date     Arthritis      Bone disease      Breast cancer (H)      H/O kyphoplasty      Hearing problem      History of kidney stones      History of radiation therapy      Hyperlipemia      Hypertension      Hypopotassemia      Kidney problem      Lymph edema      Medullary sponge kidney      Osteopenia      PONV (postoperative nausea and vomiting)      Reduced vision      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

## 2022-11-01 NOTE — LETTER
11/1/2022       RE: Nadira Perez  [address]  [facility] [address]     Dear Colleague,    Thank you for referring your patient, Nadira Perez, to the [facility] [place] at [facility]. Please see a copy of my visit note below.    [facility] Dermatology Note  Encounter Date: Nov 1, 2022  Office Visit     Dermatology Problem List:  FBSE 11/1/2022  #NUB, right shoulder (x1), right occipital scalp (x1) and central back (x1)  - s/p bx 11/1/2022  1. Hx SCC of the perineum, tx w/ excision and radiation in 1980  2. SK's. Multiple over chest back and face, and legs, s/p cryo  3. Tinea pedis w/ onychomycosis   - Terbinafine cream  4. Seborrheic dermatitis  - 2% ketoconazole shampoo, alternate with Head and Shoulders  5. Hand dermatitis, triamcinolone 0.1% cream PRN  6. Hx breast cancer    ____________________________________________    Assessment & Plan:  # NUB, right occipital scalp (x1), central back (x1), right shoulder (x1) - ddx folliculitis vs ISK  - s/p shave bx today for back and shoulder and punch bx for scalp. See procedure notes below.    # Hx NMSC, no evidence of recurrent disease   - Regular skin checks advised     # Inflamed seborrheic keratoses - right chest (x1), central chest (x1) and central abdomen (x1)  - Treated with cryo, see procedure note below     # Benign lesions: Multiple benign nevi, solar lentigos, seborrheic keratoses, cherry angiomas. Explained to patient benign nature of lesion. No treatment is necessary at this time unless the lesion changes or becomes symptomatic.   - Sun precaution was advised including the use of sun screens of SPF 30 or higher, sun protective clothing, and avoidance of tanning beds.    Procedures Performed:   - Shave biopsy procedure note, location(s): see above. After discussion of benefits and risks including but not limited to bleeding, infection, scar,  incomplete removal, recurrence, and non-diagnostic biopsy, written consent and photographs were obtained. The area was cleaned with isopropyl alcohol. 0.5mL of 1% lidocaine with epinephrine was injected to obtain adequate anesthesia of lesion(s). Shave biopsy at site(s) performed. Hemostasis was achieved with aluminium chloride. Petrolatum ointment and a sterile dressing were applied. The patient tolerated the procedure and no complications were noted. The patient was provided with verbal and written post care instructions.   - Cryotherapy procedure note, location(s): see above. After verbal consent and discussion of risks and benefits including, but not limited to, dyspigmentation/scar, blister, and pain, 3 lesion(s) was(were) treated with 1-2 mm freeze border for 1-2 cycles with liquid nitrogen. Post cryotherapy instructions were provided.  - Punch biopsy procedure note, location(s): see above. After discussion of benefits and risks including but not limited to bleeding, infection, scar, incomplete removal, recurrence, and non-diagnostic biopsy, written consent and photographs were obtained. The area was cleaned with isopropyl alcohol. 0.5mL of 1% lidocaine with epinephrine was injected to obtain adequate anesthesia and a 4 mm punch biopsy was performed at site(s). 4-0 Prolene sutures were utilized to approximate the epidermal edges. White petrolatum ointment and a bandage was applied to the wound. Explicit verbal and written wound care instructions were provided. The patient left the dermatology clinic in good condition.     Follow-up: 1 year(s) in-person pending path, or earlier for new or changing lesions    Staff and Scribe:     Scribe Disclosure:  I, Jacobo Mclaughlin, am serving as a scribe to document services personally performed by Abe Wilkerson MD based on data collection and the provider's statements to me.     Staff attestation:  The documentation recorded by the scribe accurately reflects the services I  personally performed and the decisions I personally made. I have made edits where needed.    Abe Wilkerson MD  Staff Dermatologist and Dermatopathologist  , Department of Dermatology   ____________________________________________    CC: Skin Check (Annual FBSE.  Has a spot of concern on her right shoulder, occasionally vang.)    HPI:  Ms. Nadira Perez is a(n) 72 year old female who presents today as a return patient for a FBSE. Last seen by me on 11/2/21, at which time patient underwent cryo for treatment of six inflamed seborrheic keratoses.    Today, patient has several inflamed seborrheic keratoses that are itchy and irritated that she would like removed.     Patient is otherwise feeling well, without additional skin concerns.    Labs Reviewed:  N/A    Physical Exam:  Vitals: There were no vitals taken for this visit.  SKIN: Total skin excluding the undergarment areas was performed. The exam included the head/face, neck, both arms, chest, back, abdomen, both legs, digits and/or nails.   - Right occipital scalp; 3 mm firm papule with nonspecific dermoscopy and slightly violaceous base  - Right shoulder; 4 mm flesh-colored papule  - Central mid-back; 3 mm light pink papule with erythematous base and O-shaped vessels on dermoscopy  - There are dome shaped bright red papules on the trunk and extremities.   - There is a firm tan/flesh colored papule that dimples with lateral pressure on the right distal thigh.  - Multiple regular brown pigmented macules and papules are identified on the trunk and extremities.   - Scattered brown macules on sun exposed areas.  - There are waxy stuck on tan to brown papules on the trunk and extremities.  - There are tan to brown waxy stuck on papules with surrounding erythema on the chest.   - No other lesions of concern on areas examined.     Medications:  Current Outpatient Medications   Medication     acetaminophen (TYLENOL) 500 MG tablet     amLODIPine  (NORVASC) 10 MG tablet     Ascorbic Acid (VITAMIN C) 500 MG CHEW     atorvastatin (LIPITOR) 80 MG tablet     Cholecalciferol (VITAMIN D3) 50 MCG (2000 UT) CAPS     CRANBERRY EXTRACT PO     diclofenac (VOLTAREN) 1 % topical gel     gabapentin (NEURONTIN) 300 MG capsule     HYDROcodone-acetaminophen (NORCO) 5-325 MG tablet     hydrOXYzine (VISTARIL) 25 MG capsule     ibuprofen (ADVIL/MOTRIN) 600 MG tablet     levothyroxine (SYNTHROID/LEVOTHROID) 112 MCG tablet     lisinopril (ZESTRIL) 40 MG tablet     melatonin 5 MG tablet     methocarbamol (ROBAXIN) 500 MG tablet     metoprolol succinate ER (TOPROL-XL) 50 MG 24 hr tablet     Multiple Vitamin (MULTI-VITAMIN) per tablet     naloxone (NARCAN) 4 MG/0.1ML nasal spray     ondansetron (ZOFRAN-ODT) 4 MG ODT tab     senna-docusate (SENOKOT-S/PERICOLACE) 8.6-50 MG tablet     spironolactone (ALDACTONE) 25 MG tablet     Vaginal Lubricant (REPHRESH) GEL     Current Facility-Administered Medications   Medication     dexamethasone (DECADRON) injection 1 mL     lidocaine 1 % injection 1 mL     lidocaine 1 % injection 1 mL     lidocaine 1 % injection 1 mL     lidocaine 1 % injection 1 mL     lidocaine 1 % injection 1 mL     romosozumab-aqqg (EVENITY) injection 210 mg     romosozumab-aqqg (EVENITY) injection 210 mg     romosozumab-aqqg (EVENITY) injection 210 mg     romosozumab-aqqg (EVENITY) injection 210 mg     romosozumab-aqqg (EVENITY) injection 210 mg     romosozumab-aqqg (EVENITY) injection 210 mg     triamcinolone (KENALOG-40) injection 40 mg     triamcinolone (KENALOG-40) injection 40 mg     triamcinolone (KENALOG-40) injection 40 mg     triamcinolone (KENALOG-40) injection 40 mg      Past Medical History:   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     Hypopotassemia     Hyperlipidemia     Murmurs     Nephrolithiasis     Osteoarthrosis, hand     Personal history of other malignant neoplasm of skin      Inflamed seborrheic keratosis     Essential hypertension, benign     Osteoporosis     Mechanical problems with limbs     Hypovitaminosis D     Contact dermatitis and other eczema, due to unspecified cause     Dermatitis     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     Corneal opacity     Hypothyroidism     Osteopenia, unspecified location     Aromatase inhibitor use     Chronic pain of right knee     DDD (degenerative disc disease), lumbar     Degenerative scoliosis in adult patient     Peripheral neuropathy     Spinal stenosis of lumbar region with neurogenic claudication     Spondylolisthesis of lumbar region     Brain lesion     Dermatochalasis of both upper eyelids     S/P spinal fusion     Chronic bilateral low back pain     PVD (posterior vitreous detachment), left eye     Vitreous opacities of left eye     Past Medical History:   Diagnosis Date     Arthritis      Bone disease      Breast cancer (H)      H/O kyphoplasty      Hearing problem      History of kidney stones      History of radiation therapy      Hyperlipemia      Hypertension      Hypopotassemia      Kidney problem      Lymph edema      Medullary sponge kidney      Osteopenia      PONV (postoperative nausea and vomiting)      Reduced vision      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

## 2022-11-01 NOTE — NURSING NOTE
Dermatology Rooming Note    Nadira Perez's goals for this visit include:   Chief Complaint   Patient presents with     Skin Check     Annual FBSE.  Has a spot of concern on her right shoulder, occasionally vang.     Sandra Roberts CMA

## 2022-11-01 NOTE — NURSING NOTE
Lidocaine-epinephrine 1-1:194454 % injection   3mL once for one use, starting 11/1/2022 ending 11/1/2022,  2mL disp, R-0, injection  Injected by Sandra Roberts CMA

## 2022-11-01 NOTE — PROGRESS NOTES
"ACUPUNCTURIST TREATMENT NOTE      Nadira Perez, a 72 year old female, is here today for Follow - Up exam. Patient is referred by [facility].    HPI  Main Complaint: Chronic low back pain, left sided SI joint area: Hasn't been able to notice improvement yet from acupuncture - missed two weeks in a row due to vacation. Feels good generally with medication. Has recently started some exercising again at the gym 3 times per week, and PT 3 times per week. Feeling good with movement, no increase in pain.     Concerned about falling, feels like her legs \"are working better.\" Peripheral neuropathy in bilateral feet, also with some paresthesia down lateral legs.    Frequent urination - wakes several times at night to urinate.        Past Medical History  Past Medical History Reviewed: Yes   has a past medical history of Arthritis, Bone disease, Breast cancer (H), H/O kyphoplasty, Hearing problem, History of kidney stones, History of radiation therapy, Hyperlipemia, Hypertension, Hypopotassemia, Kidney problem, Lymph edema, Medullary sponge kidney, Osteopenia, PONV (postoperative nausea and vomiting), Reduced vision, Squamous cell skin cancer, and Thyroid disease.    Objective  Basic Exam Completed:   No    TCM Exam Completed: Yes   Energy: Medium  Sleep: No concerns  Limbs/Back: Bilateral Lower Extremity and Lower Back    Tongue/Pulse Exam Completed: No    Patient Assessment  Patient Type: Pain  Patient Complaint: Chronic low back pain; L SI joint pain; neuropathy in feet    Acupuncture 10/24/2022 11/1/2022   Intervention Reason Pain Pain   Pain Location low back low back   Pre-session Pain Rating 5 7   Post-session Pain Rating - 3       TCM Diagnosis: Qi Stagnation;Blood Stagnation;Kidney Qi Deficiency      Treatment Principle: channel obstruction of Sreekanth Colon and Billy Colon; heart fire    TCM / Acupuncture Treatment  Acupuncture Points:       Initial insertions: HTJJ L2-L5, Ub23, Shiqizhuixia, Yaoyan, Ub31       Second " insertions: Marilinhui, Gb41, Ub62, Ub60, Ki3, Ub57, Gb34. Left: Ub28-30, Ub32-34, Ub53, Ub54                    Accessory Techniques 10/24/2022 11/1/2022   Accessory Techniques TDP Heat Lamp TDP Heat Lamp; Tuina; Topicals   TDP Heat Lamp location used over low back low back   Tuina location used - low back   Topicals - Po Sum On   Topicals location used - low back     Oswestry Disability Index (JOHN   Nish Weeks 1980, All rights reserved) 7/25/2022 8/8/2022   Section 1 - Pain intensity The pain is moderate at the moment. The pain is moderate at the moment.   Section 2 - Personal care (washing, dressing, etc.)  I need some help but manage most of my personal care. I need some help but manage most of my personal care.   Section 3 - Lifting I can only lift very light weights. I can only lift very light weights.   Section 4 - Walking I can only walk using a cane or crutches. I can only walk using a cane or crutches.   Section 5 - Sitting Pain prevents me from sitting for more than half an hour. Pain prevents me from sitting for more than half an hour.   Section 6 - Standing Pain prevents me from standing for more than 10 minutes. Pain prevents me from standing for more than 10 minutes.   Section 7 - Sleeping Because of pain I have less than 4 hours sleep. Because of pain I have less than 4 hours sleep.   Section 8 - Sex life (if applicable) Not answered/NA Not answered/NA   Section 9 - Social life Pain has restricted my social life and I do not go out as often. Pain has restricted my social life and I do not go out as often.   Section 10 - Traveling I can travel anywhere without pain. I can travel anywhere without pain.   Oswestry Score (%) 57.78 57.78       Assessment and Plan  Treatment Observations: Patient stated she relaxed well during treatment. Reported pain was less when getting up after treatment.  Acupuncture Treatment Recommendations: Diagnoses for treatment: Z98.1 (ICD-10-CM) - S/P spinal fusion  M54.50,  G89.29 (ICD-10-CM) - Chronic low back pain, Other low back pain. Continue weekly acupuncture for 8 more weeks, then re-evaluate.       It is my recommendation that this patient seek advice from their Primary Care Provider about active symptoms not addressed during this visit. The risks and benefits of acupuncture were reviewed and the patient stated understanding. The patient's questions were answered to their satisfaction. Consent was provided for treatment. We thank you for the referral and opportunity to treat this patient.    Time Spent with Patient:   I spent a total of 30 minutes face-to-face with Nadira Perez during today's office visit.     Zara Rosa L.Ac.  11/01/22  9:31 AM

## 2022-11-02 ENCOUNTER — OFFICE VISIT (OUTPATIENT)
Dept: ORTHOPEDICS | Facility: CLINIC | Age: 72
End: 2022-11-02
Payer: COMMERCIAL

## 2022-11-02 ENCOUNTER — OFFICE VISIT (OUTPATIENT)
Dept: PHARMACY | Facility: CLINIC | Age: 72
End: 2022-11-02
Payer: COMMERCIAL

## 2022-11-02 VITALS — BODY MASS INDEX: 28.88 KG/M2 | WEIGHT: 184 LBS | HEIGHT: 67 IN

## 2022-11-02 VITALS — SYSTOLIC BLOOD PRESSURE: 118 MMHG | DIASTOLIC BLOOD PRESSURE: 77 MMHG | HEART RATE: 69 BPM

## 2022-11-02 DIAGNOSIS — M85.80 OSTEOPENIA, UNSPECIFIED LOCATION: ICD-10-CM

## 2022-11-02 DIAGNOSIS — G47.00 INSOMNIA, UNSPECIFIED TYPE: ICD-10-CM

## 2022-11-02 DIAGNOSIS — M81.0 OSTEOPOROSIS, UNSPECIFIED OSTEOPOROSIS TYPE, UNSPECIFIED PATHOLOGICAL FRACTURE PRESENCE: Primary | ICD-10-CM

## 2022-11-02 DIAGNOSIS — Z78.9 TAKES DIETARY SUPPLEMENTS: ICD-10-CM

## 2022-11-02 DIAGNOSIS — M79.2 NEUROPATHIC PAIN: ICD-10-CM

## 2022-11-02 DIAGNOSIS — I10 BENIGN ESSENTIAL HYPERTENSION: Primary | ICD-10-CM

## 2022-11-02 PROCEDURE — 99607 MTMS BY PHARM ADDL 15 MIN: CPT | Performed by: PHARMACIST

## 2022-11-02 PROCEDURE — 99606 MTMS BY PHARM EST 15 MIN: CPT | Performed by: PHARMACIST

## 2022-11-02 PROCEDURE — 96372 THER/PROPH/DIAG INJ SC/IM: CPT | Mod: 50 | Performed by: FAMILY MEDICINE

## 2022-11-02 PROCEDURE — 99207 PR DROP WITH A PROCEDURE: CPT | Performed by: FAMILY MEDICINE

## 2022-11-02 RX ORDER — GLYCERIN/POLYCARBOPHL/CARBOMER
2 GEL WITH PREFILLED APPLICATOR (GRAM) VAGINAL
Qty: 2 G | Refills: 3 | Status: ON HOLD | OUTPATIENT
Start: 2022-11-02 | End: 2024-02-17

## 2022-11-02 RX ORDER — MULTIVITAMIN WITH IRON
400 TABLET ORAL EVERY EVENING
COMMUNITY
End: 2024-08-16 | Stop reason: DRUGHIGH

## 2022-11-02 RX ORDER — GABAPENTIN 300 MG/1
1200 CAPSULE ORAL 3 TIMES DAILY
Qty: 1080 CAPSULE | Refills: 3 | Status: SHIPPED | OUTPATIENT
Start: 2022-11-02 | End: 2023-11-30

## 2022-11-02 NOTE — LETTER
"  11/2/2022      RE: Nadira Perez  [address]  [facility] [address]     Dear Colleague,    Thank you for referring your patient, Nadira Perez, to the [facility]. Please see a copy of my visit note below.      Assessment & Plan     Osteoporosis, unspecified osteoporosis type, unspecified pathological fracture presence  Evenity #7  - romosozumab-aqqg (EVENITY) injection 210 mg             See Patient Instructions    Return in about 4 weeks (around 11/30/2022), or if symptoms worsen or fail to improve.    Cortney Clark MD  [facility]    Tom Guevara is a 72 year old accompanied by her self, presenting for the following health issues:  RECHECK (Bone health)      HPI     Pt is doing well with Evenity injections.  No headaches, body aches, chest pains.  Pt reports tolerating injections rather well    Review of Systems   Constitutional, HEENT, cardiovascular, pulmonary, gi and gu systems are negative, except as otherwise noted.     Objective    Ht 1.702 m (5' 7\")   Wt 83.5 kg (184 lb)   BMI 28.82 kg/m    Body mass index is 28.82 kg/m .  Physical Exam   NAD  nonlabored breathing      Procedure: Some benefits discussed and patient was consented.  Chloroprep used to clean the area of the skin and ethyl chloride to anesthetize the area.  1 injected each lateral thigh.  Band aids in place.  Well tolerated.      Again, thank you for allowing me to participate in the care of your patient.      Sincerely,    Cortney Clark MD    "

## 2022-11-02 NOTE — PATIENT INSTRUCTIONS
"Recommendations from today's MTM visit:                                                       1. Discuss Cymbalta with Dr. Clark and ask what her thoughts are on the risk for increased fracture risk.  2. Increase gabapentin to 1200 mg three times a day.    Follow-up: Return in about 26 weeks (around 5/3/2023) for Follow up, with me, in person.    It was great speaking with you today.  I value your experience and would be very thankful for your time in providing feedback in our clinic survey. In the next few days, you may receive an email or text message from [employer] with a link to a survey related to your  clinical pharmacist.\"     To schedule another MTM appointment, please call the clinic directly or you may call the MTM scheduling line at [phone] or toll-free at [phone].     My Clinical Pharmacist's contact information:                                                      Please feel free to contact me with any questions or concerns you have.      Willis So, Pharm. D., BCACP  Medication Therapy Management Pharmacist  Direct Voicemail: [phone]     "

## 2022-11-02 NOTE — PROGRESS NOTES
"  Assessment & Plan     Osteoporosis, unspecified osteoporosis type, unspecified pathological fracture presence  Evenity #7  - romosozumab-aqqg (EVENITY) injection 210 mg             See Patient Instructions    Return in about 4 weeks (around 11/30/2022), or if symptoms worsen or fail to improve.    Cortney Clark MD  [facility] THIERRY Guevara is a 72 year old accompanied by her self, presenting for the following health issues:  RECHECK (Bone health)      HPI     Pt is doing well with Evenity injections.  No headaches, body aches, chest pains.  Pt reports tolerating injections rather well    Review of Systems   Constitutional, HEENT, cardiovascular, pulmonary, gi and gu systems are negative, except as otherwise noted.      Objective    Ht 1.702 m (5' 7\")   Wt 83.5 kg (184 lb)   BMI 28.82 kg/m    Body mass index is 28.82 kg/m .  Physical Exam   NAD  nonlabored breathing                  Procedure: Some benefits discussed and patient was consented.  Chloroprep used to clean the area of the skin and ethyl chloride to anesthetize the area.  1 injected each lateral thigh.  Band aids in place.  Well tolerated.  "

## 2022-11-02 NOTE — NURSING NOTE
[address] [address]  Dept: [phone]  ______________________________________________________________________________    Patient: Nadira Perez   : 1950   MRN: [id number]   2022    INVASIVE PROCEDURE SAFETY CHECKLIST    Date: 2022   Procedure:Bilateral subcutaneous evenity injections   Patient Name: Nadira Perez  MRN: [id number]  YOB: 1950    Action: Complete sections as appropriate. Any discrepancy results in a HARD COPY until resolved.     PRE PROCEDURE:  Patient ID verified with 2 identifiers (name and  or MRN): Yes  Procedure and site verified with patient/designee (when able): Yes  Accurate consent documentation in medical record: Yes  H&P (or appropriate assessment) documented in medical record: Yes  H&P must be up to 20 days prior to procedure and updates within 24 hours of procedure as applicable: NA  Relevant diagnostic and radiology test results appropriately labeled and displayed as applicable: Yes  Procedure site(s) marked with provider initials: NA    TIMEOUT:  Time-Out performed immediately prior to starting procedure, including verbal and active participation of all team members addressing the following:Yes  * Correct patient identify  * Confirmed that the correct side and site are marked  * An accurate procedure consent form  * Agreement on the procedure to be done  * Correct patient position  * Relevant images and results are properly labeled and appropriately displayed  * The need to administer antibiotics or fluids for irrigation purposes during the procedure as applicable   * Safety precautions based on patient history or medication use    DURING PROCEDURE: Verification of correct person, site, and procedures any time the responsibility for care of the patient is transferred to another member of the care team.       Prior to injection, verified patient identity using  patient's name and date of birth.  Due to injection administration, patient instructed to remain in clinic for 15 minutes  afterwards, and to report any adverse reaction to me immediately.    Evenity injection    Drug Amount Wasted:  None.  Vial/Syringe: Syringe  Expiration Date:  09/01/2024      Gia Dewitt, ATC  November 2, 2022

## 2022-11-02 NOTE — PROGRESS NOTES
Medication Therapy Management (MTM) Encounter    ASSESSMENT:                            Medication Adherence/Access: No issues identified    Pain: Since patient GFR is >80 mL/min she has the option to increase her gabapentin to the max dose of 3600 mg per day. Will need to have frequent BASIC METABOLIC PANEL checks to make sure that her GFR stays at that level. Could also consider adding a synergistic medication such as Cymbalta. However, there is some concern as selective serotonin reuptake inhibitor/SNRIs have a potential for increased risk of fractures and she has a significant history of fractures.     Hypertension: Patient is meeting blood pressure goal of < 130/80mmHg.    Osteoporosis: Stable.     Vitamin D: Stable.      Insomnia: Stable.     PLAN:                            1. Discuss Cymbalta with Dr. Clark and ask what her thoughts are on the risk for increased fracture risk.  2. Increase gabapentin to 1200 mg three times a day.    Follow-up: Return in about 26 weeks (around 5/3/2023) for Follow up, with me, in person.    SUBJECTIVE/OBJECTIVE:                          Nadira Perez is a 72 year old female coming in for a follow-up visit.  Today's visit is a follow-up MTM visit from 4/19/22     Reason for visit: 6 month check in.    Allergies/ADRs: Reviewed in chart  Past Medical History: Reviewed in chart  Tobacco: She reports that she has never smoked. She has never used smokeless tobacco.  Alcohol: 1-2 per month    Medication Adherence/Access: Per patient, misses medication 1 times per week.     Pain: She notes that she is taking gabapentin 900 mg three times a day and would like a refill for this amount as it is helping her pain without causing side effects. She is also taking methocarbamol 500 mg three times a day as needed. She has been having more back pain and wrist pain so she has been taking ibuprofen 600 mg three times a day, Norco 5-325 mg as needed and she is hardly taking this about 0-2  per week (has a naloxone just in case).       Hypertension: Current medications include metoprolol XL 50 mg at 10 AM, she takes the amlodipine 10 mg at 10 pm, she takes lisinopril 40 mg at 800 am, and spironolactone 50 mg daily at 10 am.  She reports that its sometimes hard to get all three in. Patient does self-monitor blood pressure. Home BP monitoring in range of 130s/70s mmHg.      BP Readings from Last 3 Encounters:   11/02/22 118/77   10/27/22 130/74   06/20/22 (!) 136/90        Potassium   Date Value Ref Range Status   10/27/2022 4.4 3.4 - 5.3 mmol/L Final   07/16/2022 4.3 3.4 - 5.3 mmol/L Final   11/18/2021 2.6 (LL) 3.4 - 5.3 mmol/L Final   05/07/2021 3.2 (L) 3.4 - 5.3 mmol/L Final   05/07/2021 3.3 (L) 3.4 - 5.3 mmol/L Final     Osteoporosis: Currently taking Evenity 210 mg once a month. Reports she has had a few falls since starting this and hasn't broken anything. She previously had experienced fractures with falls.     Vitamin D: Currently taking vitamin D 6000  units daily. No concerns or questions at this time.      Vitamin D Deficiency Screening Results:  Lab Results   Component Value Date    VITDT 56 10/27/2022    VITDT 43 05/04/2022    VITDT 87 (H) 01/12/2022    VITDT 69 05/07/2021    VITDT 56 05/12/2020       Insomnia: Currently taking hydroxyzine 25 mg at bedtime and has been waking up feeling refreshed at 4 am melatonin but it is still having difficulty staying asleep and will wake up around 2 am or 4 am and have trouble going back to sleep.      Today's Vitals: /77   Pulse 69   ----------------      I spent 30 minutes with this patient today. All changes were made via collaborative practice agreement with MERCEDES Kyle CNP. A copy of the visit note was provided to the patient's provider(s).    The patient was sent via [employer] a summary of these recommendations.     Willis So, Pharm. D., [facility]  Medication Therapy Management Pharmacist  Direct Voicemail: [phone]        Medication Therapy Recommendations  Insomnia, unspecified type    Rationale: Condition refractory to medication - Ineffective medication - Effectiveness   Recommendation: Start Medication   Status: No Longer Relevant         Neuropathic pain    Current Medication: gabapentin (NEURONTIN) 300 MG capsule   Rationale: Dose too low - Dosage too low - Effectiveness   Recommendation: Increase Dose   Status: Accepted per CPA

## 2022-11-03 ENCOUNTER — THERAPY VISIT (OUTPATIENT)
Dept: PHYSICAL THERAPY | Facility: CLINIC | Age: 72
End: 2022-11-03
Payer: COMMERCIAL

## 2022-11-03 DIAGNOSIS — Z98.1 S/P SPINAL FUSION: Primary | ICD-10-CM

## 2022-11-03 PROCEDURE — 97110 THERAPEUTIC EXERCISES: CPT | Mod: GP | Performed by: PHYSICAL THERAPIST

## 2022-11-03 PROCEDURE — 97112 NEUROMUSCULAR REEDUCATION: CPT | Mod: GP | Performed by: PHYSICAL THERAPIST

## 2022-11-03 NOTE — PROGRESS NOTES
Subjective:  HPI  Physical Exam                    Objective:  System    Physical Exam    General     ROS    Assessment/Plan:    DISCHARGE REPORT    Progress reporting period is from IE to 11/3/2022.       SUBJECTIVE  Subjective changes noted by patient:  .  Subjective: Returns to PT after break due to other medical treatement (balance and acupuncture).  Doing PT exercse and gym work out 3 days/work.  to start pilates work out next week.  Feeling stronger and less pain.  May look into getting a TENs unit for additional pain control    Current pain level is  Current Pain level: 2/10.     Previous pain level was   Initial Pain level: 3/10.   Changes in function:  Yes (See Goal flowsheet attached for changes in current functional level)  Adverse reaction to treatment or activity: None    OBJECTIVE  Changes noted in objective findings:    Objective: Improved upright posture with gait without cuing.  Improved endurance with all HEP.  Lx ROM: flex min loss -, ext mod/max loss +/-, B SB min loss +/-.  Fair core stab recruitment.  B hip 4-/5     ASSESSMENT/PLAN  Updated problem list and treatment plan: Diagnosis 1:  S/P L5-S1 fusion  Pain -  home program  Decreased ROM/flexibility - home program  Decreased strength - home program  Decreased proprioception - home program  Impaired gait - home program  Impaired muscle performance - home program  Decreased function - home program  Impaired posture - home program  STG/LTGs have been met or progress has been made towards goals:  Yes (See Goal flow sheet completed today.)  Assessment of Progress: The patient's condition is improving.  Self Management Plans:  Patient is independent in a home treatment program.  Patient is independent in self management of symptoms.  I have re-evaluated this patient and find that the nature, scope, duration and intensity of the therapy is appropriate for the medical condition of the patient.  Nadira continues to require the following  intervention to meet STG and LTG's:  PT intervention is no longer required to meet STG/LTG.    Recommendations:  This patient is ready to be discharged from therapy and continue their home treatment program.    Please refer to the daily flowsheet for treatment today, total treatment time and time spent performing 1:1 timed codes.

## 2022-11-04 LAB
PATH REPORT.COMMENTS IMP SPEC: NORMAL
PATH REPORT.COMMENTS IMP SPEC: NORMAL
PATH REPORT.FINAL DX SPEC: NORMAL
PATH REPORT.GROSS SPEC: NORMAL
PATH REPORT.MICROSCOPIC SPEC OTHER STN: NORMAL
PATH REPORT.RELEVANT HX SPEC: NORMAL

## 2022-11-05 NOTE — PROGRESS NOTES
Ismael is a 72 year old who is being evaluated via a billable video visit.      How would you like to obtain your AVS? MyChart  If the video visit is dropped, the invitation should be resent by: Text to cell phone: [phone]  Will anyone else be joining your video visit? Bess Buchanan         Video-Visit Details    Video Start Time: 10:50 AM    Type of service:  Video Visit    Video End Time:11:33 AM    Originating Location (pt. Location): Home        Distant Location (provider location):  On-site    Platform used for Video Visit: [facility]   CANCER SURVIVORSHIP VISIT-virtual  Nov 15, 2022    Care Team   Primary Care Provider Matilde Quiñonez   Surgeon  Mauro Mei MD   Radiation Oncologist Nghia Burch MD   Medical Oncologist  Martha William MD, Khushbu Louise MD       REASON FOR VISIT: survivorship visit for history of breast cancer    CANCER HISTORY AND TREATMENT:    History of left-sided stage IIIC inflammatory breast cancer, ER/OK positive, HER2 negative, diagnosed in 2007.  *Diagnosis: 6/13/2007. 57 years old. Presented with left-sided breast discomfort which extended into the axilla. Clinical stage IIIC (T4d N3 M0), infiltrating ductal carcinoma, ER/OK positive, HER2-oneida negative. She had skin and areolar complex thickening consistent with inflammatory breast cancer.   *Neoadjuvant chemotherapy: She received FEC (Fluorouracil, Epirubicin, Cyclosphamide) for 3 cycles through 08/24/2007. This was followed by Taxotere for 3 cycles completed 10/26/2007. Estimated cumulative epirubicin dosing was 300 mg/m2 (900 mg/m2 is cumulative lifetime dose).   *Surgery: 11/20/2007 left-sided mastectomy with axillary lymph node dissection. 1 cm residual carcinoma with DCIS. 13/33 lymph nodes were positive, largest was 0.6 cm with extracapsular extension.   *Radiation: She received radiation to the left chest wall at a dose of 6440 cGy, to the left supraclavicular fossa at a dose of 5040 cGy, and to the  left internal mammary chain at a dose of 5080 cGy.  Last dose of radiation was administered on 03/07/2008.         *Endocrine therapy: Arimidex 11//2007-11/2012, Tamoxifen 11/2012-11/2017, Arimidex 11/2017-11/2021. 14 years of endocrine therapy.   *Genetic testing: no  *Other information: s/p right sided prophylactic mastectomy 11/20/2007. Zometa every 6 months from 11/2017-11/2021 (4 years).     Cardiovascular risk screen:  Age >60 at cancer treatment: no   History of EF<55%: no  History of MI: no  Anthracycline: yes   Radiotherapy to the left chest: yes  History of trastuzumab: no  Smoking history: no  Hypertension: yes  Hyperlididemia: yes  Diabetes: no  Obesity: no      INTERVAL HISTORY:     Ismael presents for a cancer survivorship visit referred by Dr. Louise. She reports ongoing issues with dizziness and sees neurology for this. She had a brain MRI in 2020 after sx started which was benign. She fell and broke her 2nd right metatarsal, right fibula, and 4th finger, and thumb. She has chronic low back pain and had back surgery last year. She had a CT of her lumbar spine 5/2022 negative for metastatic disease. She had an episode of diverticulitis in April and CT was negative for signs of metastatic disease.     No new lumps/bumps or new pain. No concerns today.     Late effects: lymphedema- she would like a referral to go back and see PT in [place]. She has neuropathy that precedes her cancer diagnosis and she is interested in a referral to PM&R for evaluation. She has osteopenia and sports medicine has her on Evenity.     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     Thyroid disease        Current Outpatient Medications   Medication Sig Dispense Refill     acetaminophen (TYLENOL) 500 MG tablet Take 500-1,000 mg by mouth every 6 hours as needed for mild pain       amLODIPine (NORVASC) 10 MG tablet Take 1 tablet (10 mg) by mouth daily 90 tablet 3     Ascorbic Acid (VITAMIN C) 500 MG CHEW Take 1 tablet by mouth daily       atorvastatin (LIPITOR) 80 MG tablet Take 1 tablet (80 mg) by mouth daily 90 tablet 3     Cholecalciferol (VITAMIN D3) 50 MCG (2000 UT) CAPS Take 6,000 Units by mouth daily 270 capsule 3     CRANBERRY EXTRACT PO Take 650 mg by mouth daily ~10 am  Takes 1       diclofenac (VOLTAREN) 1 % topical gel APPLY 4 GRAMS TO KNEES OR 2 GRAMS TO HANDS FOUR TIMES DAILY USING ENCLOSED DOSING CARD. 100 g 3     gabapentin (NEURONTIN) 300 MG capsule Take 4 capsules (1,200 mg) by mouth 3 times daily 1080 capsule 3     HYDROcodone-acetaminophen (NORCO) 5-325 MG tablet Take 1 tablet by mouth every 6 hours as needed for severe pain 30 tablet 0     hydrOXYzine (VISTARIL) 25 MG capsule Take 1 capsule (25 mg) by mouth At Bedtime 90 capsule 3     ibuprofen (ADVIL/MOTRIN) 600 MG tablet Take 1 tablet (600 mg) by mouth every 6 hours as needed for other (mild and/or inflammatory pain) 30 tablet 0     levothyroxine (SYNTHROID/LEVOTHROID) 112 MCG tablet TAKE 1/2 TABLET BY MOUTH DAILY 45 tablet 3     lisinopril (ZESTRIL) 40 MG tablet Take 1 tablet (40 mg) by mouth daily 90 tablet 3     magnesium 250 MG tablet Take 2 tablets by mouth daily       melatonin 5 MG tablet Take 10 mg by mouth At Bedtime        methocarbamol (ROBAXIN) 500 MG tablet Take 1 tablet (500 mg) by mouth 3 times daily (Patient taking differently: Take 500 mg by mouth 3 times daily as needed) 40 tablet 4     metoprolol succinate ER (TOPROL-XL) 50 MG 24 hr tablet Take 1 tablet (50 mg) by mouth  daily 90 tablet 3     Multiple Vitamin (MULTI-VITAMIN) per tablet Take 1 tablet by mouth daily       naloxone (NARCAN) 4 MG/0.1ML nasal spray Spray 1 spray (4 mg) into one nostril alternating nostrils once as needed for opioid reversal every 2-3 minutes until assistance arrives 0.2 mL 0     ondansetron (ZOFRAN-ODT) 4 MG ODT tab Take 1 tablet (4 mg) by mouth every 12 hours as needed for nausea 180 tablet 3     senna-docusate (SENOKOT-S/PERICOLACE) 8.6-50 MG tablet Take 2 tablets by mouth 2 times daily (Patient taking differently: Take 2 tablets by mouth 2 times daily as needed) 30 tablet 0     spironolactone (ALDACTONE) 25 MG tablet Take 2 tablets (50 mg) by mouth daily 180 tablet 3     Vaginal Lubricant (REPHRESH) GEL Place 2 g vaginally every 3 days 2 g 3          Allergies   Allergen Reactions     Erythromycin Nausea     Penicillins Hives     Around age 6 - doesn't recall full reaction, mother told her it was hives.     Has tolerated cephalsporins.        Family History   Problem Relation Age of Onset     Neurologic Disorder Mother         Anuerysm of Cerebral Artery, Dementia     Diabetes Mother      Thyroid Disease Mother         ,     Cerebrovascular Disease Mother      Dementia Mother      Osteoporosis Mother      Heart Disease Father         AAA     Hypertension Father      Circulatory Brother         Perihperal Neurophathy     Diabetes Maternal Grandmother      Asthma Maternal Grandmother      Chronic Obstructive Pulmonary Disease Maternal Grandfather         father     Asthma Maternal Grandfather      Diabetes Maternal Aunt         x2     Melanoma Maternal Aunt      Glaucoma Maternal Aunt      Breast Cancer Cousin      Dementia Other      Cancer Other         malignant melanoma     Hypertension Other      Hypertension Other      Cerebrovascular Disease Other      Cerebrovascular Disease Other      Obesity Other      Respiratory Other      Cancer Other      Diabetes Other      Asthma Other      Macular  Degeneration No family hx of      Coronary Artery Disease No family hx of      Hyperlipidemia No family hx of      Kidney Disease No family hx of      Thrombosis No family hx of      Arthritis No family hx of      Depression No family hx of      Mental Illness No family hx of      Substance Abuse No family hx of      Cystic Fibrosis No family hx of      Early Death No family hx of      Coronary Artery Disease Early Onset No family hx of      Heart Failure No family hx of      Bleeding Diathesis No family hx of      Ovarian Cancer No family hx of      Uterine Cancer No family hx of      Prostate Cancer No family hx of      Colorectal Cancer No family hx of      Pancreatic Cancer No family hx of      Lung Cancer No family hx of      Other Cancer No family hx of      Autoimmune Disease No family hx of      Unknown/Adopted No family hx of      Genetic Disorder No family hx of      Bleeding Disorder No family hx of      Clotting Disorder No family hx of      Anesthesia Reaction No family hx of         Social history:  Living situation: , lives in [place]. She is planning a remodel.   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   Skin: No visualized rash or lesions on visualized skin  Eyes: EOMI, no erythema, sclera icterus or discharge noted  Resp: Appears to be breathing comfortably without accessory muscle usage, speaking in full sentences, no cough  MSK: Appears to have normal range of motion based on visualized movements  Neurologic: No apparent tremors, facial movements symmetric  Psych: affect versatile, alert and oriented    IMPRESSION/PLAN:    History of left-sided stage IIIC  inflammatory breast cancer, ER/SC positive, HER2 negative, diagnosed in 2007.  Treated with neoadjuvant chemo, bilateral mastectomy, radiation, and 14 years of endocrine therapy.   15 years out without evidence of recurrence.   No routine surveillance scans or tumor markers indicated but I have a low threshold to evaluate new complaints.   Annual cancer survivorship visit until she is 20 years out then prn. Next year she prefers in person.     Lymphedema: Ongoing issue. She would like a referral back to PT.     Peripheral neuropathy: precedes cancer diagnosis. She would like a referral for evaluation. She is plugged into neurology but they are managing her dizziness. She is interested in seeing PM&R.     Potential late effects related to chemotherapy:  -Cardiotoxicity. Given her exposure to an anthracycline and radiation including the left chest, she may be at risk for cardiomyopathy, arrhythmias, and left ventricular dysfunction. A post-treatment echo has been performed in 2019 (EF 61%).  She should continue to follow-up routinely with her PCP for lipid testing, and monitoring of blood pressure and blood glucose, with treatment as indicated.    Potential late effects related to radiation:  - Possible radiation side effects include cardiotoxicity, lung injury, fracture, and second malignancies. She should seek medical attention if she notices any new skin lesions in the area of radiation. If she should develop any shortness of breath, hemoptysis, cough, or new pain, I would advise further evaluation with CT or X-ray.     Potential effects related to endocrine therapy:  Risk of developing osteoporosis: Last DEXA 11/2021 with a lowest T-score of -1.4, Recommend weightbearing exercises 2-3 days per week, and to supplement with 1200 mg of calcium, 1000 international units of vitamin D daily. S/p fosamax and 4 years of IV bisphosphonate. She reports a history of Prolia and is now on Evenity managed by sports medicine.       General late effects screenings and recommendations    -Cancer screening. She should undergo routine screening for women in her age group.     -Healthy lifestyle. She should maintain a healthy weight with a BMI between 20-25. She should exercise at least 150 minutes weekly at moderate intensity. She should see the eye doctor every 1-2 years, and dentist every 6 months for cleanings. She should not use any tobacco. She should minimize alcohol intake. If continuing to drink, should follow CDC recommendations for no more than 1 alcoholic drink/day for women.    Non-urgent scheduling should be complete within 7-10 business days. However, if you have not received a phone call from scheduling within 3 business days, you may call to schedule at [phone] option 5, option 2. This is the same number to call to make changes tor if you have questions about the schedule.    Gave resources for our [facility] Cancer Survivorship class series and mailings list for educational opportunities. [email or web address]    Cancer treatment summary was sent electronically to the patient and her PCP.      The total time of this encounter amounted to 60 minutes.This time included time spent with the patient, prep work, ordering tests, creating a cancer treatment summary, and performing post visit documentation.    MEERA Anaya, PA-C  [facility]

## 2022-11-07 ENCOUNTER — TELEPHONE (OUTPATIENT)
Dept: ORTHOPEDICS | Facility: CLINIC | Age: 72
End: 2022-11-07

## 2022-11-10 ENCOUNTER — OFFICE VISIT (OUTPATIENT)
Dept: PALLIATIVE MEDICINE | Facility: OTHER | Age: 72
End: 2022-11-10
Payer: COMMERCIAL

## 2022-11-10 DIAGNOSIS — M54.50 CHRONIC BILATERAL LOW BACK PAIN, UNSPECIFIED WHETHER SCIATICA PRESENT: ICD-10-CM

## 2022-11-10 DIAGNOSIS — Z98.1 S/P SPINAL FUSION: Primary | ICD-10-CM

## 2022-11-10 DIAGNOSIS — G89.29 CHRONIC BILATERAL LOW BACK PAIN, UNSPECIFIED WHETHER SCIATICA PRESENT: ICD-10-CM

## 2022-11-10 DIAGNOSIS — M54.59 OTHER LOW BACK PAIN: ICD-10-CM

## 2022-11-10 PROCEDURE — 97810 ACUP 1/> WO ESTIM 1ST 15 MIN: CPT | Performed by: ACUPUNCTURIST

## 2022-11-10 PROCEDURE — 97811 ACUP 1/> W/O ESTIM EA ADD 15: CPT | Performed by: ACUPUNCTURIST

## 2022-11-10 NOTE — PROGRESS NOTES
ACUPUNCTURIST TREATMENT NOTE      Nadira Perez, a 72 year old female, is here today for Follow - Up exam. Patient is referred by Anyiwe. Treatment 4.    HPI  Main Complaint: Chronic low back pain, L sided: Continuing with pain mainly in L SI joint area. After last treatment pain improved, but levels increased again eventually, depends upon activity level.    Reports sleep has been better.       Secondary Complaints: Neuropathy in feet, paresthesia in lateral legs: Continues to feel neuropathy in feet. Paresthesia on LLE is very mild.    Past Medical History  Past Medical History Reviewed: Yes   has a past medical history of Arthritis, Bone disease, Breast cancer (H), H/O kyphoplasty, Hearing problem, History of kidney stones, History of radiation therapy, Hyperlipemia, Hypertension, Hypopotassemia, Kidney problem, Lymph edema, Medullary sponge kidney, Osteopenia, PONV (postoperative nausea and vomiting), Reduced vision, Squamous cell skin cancer, and Thyroid disease.    Objective  Basic Exam Completed:   No    TCM Exam Completed: Yes   Energy: Medium  Sleep: No concerns  Limbs/Back: Left Lower Extremity and Lower Back    Tongue/Pulse Exam Completed: No    Patient Assessment  Patient Type: Pain  Patient Complaint: Chronic low back pain; L SI joint pain; neuropathy in feet    Acupuncture 11/1/2022 11/10/2022   Intervention Reason Pain Pain   Pain Location low back low back   Pre-session Pain Rating 7 4   Post-session Pain Rating 3 3       TCM Diagnosis: Qi Stagnation;Blood Stagnation;Kidney Qi Deficiency      Treatment Principle: channel obstruction of Sreekanth Colon and Billy Colon; heart fire    TCM / Acupuncture Treatment  Acupuncture Points:       Initial insertions: HTJJ L2-L5, Ub23, Shiqizhuixia, Yaoyan       Second insertions: Baihui, Gb41, Ub62, Ub60, Ub57, Gb34. Left: Ub26-34, Ub53, Ub54, piriformis MP                    Accessory Techniques 11/1/2022 11/10/2022   Accessory Techniques TDP Heat Lamp; Tuina;  Topicals TDP Heat Lamp; Tuina; Topicals   TDP Heat Lamp location used low back low back   Tuina location used low back low back   Topicals Po Sum On Po Sum On   Topicals location used low back low back     Oswestry Disability Index (JOHN   Nish Micky 1980, All rights reserved) 8/8/2022 11/3/2022   Section 1 - Pain intensity The pain is moderate at the moment. The pain is moderate at the moment.   Section 2 - Personal care (washing, dressing, etc.)  I need some help but manage most of my personal care. I can look after myself normally but it is very painful.   Section 3 - Lifting I can only lift very light weights. I can only lift very light weights.   Section 4 - Walking I can only walk using a cane or crutches. Pain prevents me from walking more than a quarter of a mile.   Section 5 - Sitting Pain prevents me from sitting for more than half an hour. Pain prevents me from sitting for more than 1 hour.   Section 6 - Standing Pain prevents me from standing for more than 10 minutes. Pain prevents me from standing for more than half an hour.   Section 7 - Sleeping Because of pain I have less than 4 hours sleep. Because of pain I have less than 6 hours sleep.   Section 8 - Sex life (if applicable) Not answered/NA Not answered/NA   Section 9 - Social life Pain has restricted my social life and I do not go out as often. Pain has restricted my social life and I do not go out as often.   Section 10 - Traveling I can travel anywhere without pain. I can travel anywhere without pain.   Oswestry Score (%) 57.78 42.22       Assessment and Plan  Treatment Observations: Pateint relaxed well during treatment  Acupuncture Treatment Recommendations: Diagnoses for treatment: Z98.1 (ICD-10-CM) - S/P spinal fusion  M54.50, G89.29 (ICD-10-CM) - Chronic low back pain, Other low back pain. Continue weekly acupuncture for 8 more weeks, then re-evaluate.       It is my recommendation that this patient seek advice from their Primary Care  Provider about active symptoms not addressed during this visit. The risks and benefits of acupuncture were reviewed and the patient stated understanding. The patient's questions were answered to their satisfaction. Consent was provided for treatment. We thank you for the referral and opportunity to treat this patient.    Time Spent with Patient:   I spent a total of 30 minutes face-to-face with Nadira Perez during today's office visit.     Zara Rosa L.Ac.  11/10/22

## 2022-11-15 ENCOUNTER — VIRTUAL VISIT (OUTPATIENT)
Dept: ONCOLOGY | Facility: CLINIC | Age: 72
End: 2022-11-15
Attending: PHYSICIAN ASSISTANT
Payer: COMMERCIAL

## 2022-11-15 DIAGNOSIS — G62.9 PERIPHERAL POLYNEUROPATHY: ICD-10-CM

## 2022-11-15 DIAGNOSIS — I89.0 LYMPHEDEMA: ICD-10-CM

## 2022-11-15 DIAGNOSIS — Z85.3 PERSONAL HISTORY OF MALIGNANT NEOPLASM OF BREAST: Primary | ICD-10-CM

## 2022-11-15 PROCEDURE — 99215 OFFICE O/P EST HI 40 MIN: CPT | Mod: 95 | Performed by: PHYSICIAN ASSISTANT

## 2022-11-15 PROCEDURE — G0463 HOSPITAL OUTPT CLINIC VISIT: HCPCS | Mod: PN,RTG | Performed by: PHYSICIAN ASSISTANT

## 2022-11-15 NOTE — LETTER
11/15/2022         RE: Nadira Perez  [id number]  [facility] [place] [address]      Ismael is a 72 year old who is being evaluated via a billable video visit.      How would you like to obtain your AVS? MyChart  If the video visit is dropped, the invitation should be resent by: Text to cell phone: [phone]  Will anyone else be joining your video visit? Bess Buchanan         Video-Visit Details    Video Start Time: 10:50 AM    Type of service:  Video Visit    Video End Time:11:33 AM    Originating Location (pt. Location): Home        Distant Location (provider location):  On-site    Platform used for Video Visit: [facility]   CANCER SURVIVORSHIP VISIT-virtual  Nov 15, 2022    Care Team   Primary Care Provider Matilde Quiñonez   Surgeon  Mauro Mei MD   Radiation Oncologist Nghia Burch MD   Medical Oncologist  Martha William MD, Khushbu Louise MD       REASON FOR VISIT: survivorship visit for history of breast cancer    CANCER HISTORY AND TREATMENT:    History of left-sided stage IIIC inflammatory breast cancer, ER/FL positive, HER2 negative, diagnosed in 2007.  *Diagnosis: 6/13/2007. 57 years old. Presented with left-sided breast discomfort which extended into the axilla. Clinical stage IIIC (T4d N3 M0), infiltrating ductal carcinoma, ER/FL positive, HER2-oneida negative. She had skin and areolar complex thickening consistent with inflammatory breast cancer.   *Neoadjuvant chemotherapy: She received FEC (Fluorouracil, Epirubicin, Cyclosphamide) for 3 cycles through 08/24/2007. This was followed by Taxotere for 3 cycles completed 10/26/2007. Estimated cumulative epirubicin dosing was 300 mg/m2 (900 mg/m2 is cumulative lifetime dose).   *Surgery: 11/20/2007 left-sided mastectomy with axillary lymph node dissection. 1 cm residual carcinoma with DCIS. 13/33 lymph nodes were positive, largest was 0.6 cm with extracapsular extension.   *Radiation: She received radiation to the left chest wall  at a dose of 6440 cGy, to the left supraclavicular fossa at a dose of 5040 cGy, and to the left internal mammary chain at a dose of 5080 cGy.  Last dose of radiation was administered on 03/07/2008.         *Endocrine therapy: Arimidex 11//2007-11/2012, Tamoxifen 11/2012-11/2017, Arimidex 11/2017-11/2021. 14 years of endocrine therapy.   *Genetic testing: no  *Other information: s/p right sided prophylactic mastectomy 11/20/2007. Zometa every 6 months from 11/2017-11/2021 (4 years).     Cardiovascular risk screen:  Age >60 at cancer treatment: no   History of EF<55%: no  History of MI: no  Anthracycline: yes   Radiotherapy to the left chest: yes  History of trastuzumab: no  Smoking history: no  Hypertension: yes  Hyperlididemia: yes  Diabetes: no  Obesity: no      INTERVAL HISTORY:     Ismael presents for a cancer survivorship visit referred by Dr. Louise. She reports ongoing issues with dizziness and sees neurology for this. She had a brain MRI in 2020 after sx started which was benign. She fell and broke her 2nd right metatarsal, right fibula, and 4th finger, and thumb. She has chronic low back pain and had back surgery last year. She had a CT of her lumbar spine 5/2022 negative for metastatic disease. She had an episode of diverticulitis in April and CT was negative for signs of metastatic disease.     No new lumps/bumps or new pain. No concerns today.     Late effects: lymphedema- she would like a referral to go back and see PT in [place]. She has neuropathy that precedes her cancer diagnosis and she is interested in a referral to PM&R for evaluation. She has osteopenia and sports medicine has her on Evenity.     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     Thyroid disease        Current Outpatient Medications   Medication Sig Dispense Refill     acetaminophen (TYLENOL) 500 MG tablet Take 500-1,000 mg by mouth every 6 hours as needed for mild pain       amLODIPine (NORVASC) 10 MG tablet Take 1 tablet (10 mg) by mouth daily 90 tablet 3     Ascorbic Acid (VITAMIN C) 500 MG CHEW Take 1 tablet by mouth daily       atorvastatin (LIPITOR) 80 MG tablet Take 1 tablet (80 mg) by mouth daily 90 tablet 3     Cholecalciferol (VITAMIN D3) 50 MCG (2000 UT) CAPS Take 6,000 Units by mouth daily 270 capsule 3     CRANBERRY EXTRACT PO Take 650 mg by mouth daily ~10 am  Takes 1       diclofenac (VOLTAREN) 1 % topical gel APPLY 4 GRAMS TO KNEES OR 2 GRAMS TO HANDS FOUR TIMES DAILY USING ENCLOSED DOSING CARD. 100 g 3     gabapentin (NEURONTIN) 300 MG capsule Take 4 capsules (1,200 mg) by mouth 3 times daily 1080 capsule 3     HYDROcodone-acetaminophen (NORCO) 5-325 MG tablet Take 1 tablet by mouth every 6 hours as needed for severe pain 30 tablet 0     hydrOXYzine (VISTARIL) 25 MG capsule Take 1 capsule (25 mg) by mouth At Bedtime 90 capsule 3     ibuprofen (ADVIL/MOTRIN) 600 MG tablet Take 1 tablet (600 mg) by mouth every 6 hours as needed for other (mild and/or inflammatory pain) 30 tablet 0     levothyroxine (SYNTHROID/LEVOTHROID) 112 MCG tablet TAKE 1/2 TABLET BY MOUTH DAILY 45 tablet 3     lisinopril (ZESTRIL) 40 MG tablet Take 1 tablet (40 mg) by mouth daily 90 tablet 3     magnesium 250 MG tablet Take 2 tablets by mouth daily       melatonin 5 MG tablet Take 10 mg by mouth At Bedtime        methocarbamol (ROBAXIN) 500 MG tablet Take 1 tablet (500 mg) by mouth 3 times daily (Patient taking differently: Take 500 mg by mouth 3 times daily as needed) 40 tablet 4      metoprolol succinate ER (TOPROL-XL) 50 MG 24 hr tablet Take 1 tablet (50 mg) by mouth daily 90 tablet 3     Multiple Vitamin (MULTI-VITAMIN) per tablet Take 1 tablet by mouth daily       naloxone (NARCAN) 4 MG/0.1ML nasal spray Spray 1 spray (4 mg) into one nostril alternating nostrils once as needed for opioid reversal every 2-3 minutes until assistance arrives 0.2 mL 0     ondansetron (ZOFRAN-ODT) 4 MG ODT tab Take 1 tablet (4 mg) by mouth every 12 hours as needed for nausea 180 tablet 3     senna-docusate (SENOKOT-S/PERICOLACE) 8.6-50 MG tablet Take 2 tablets by mouth 2 times daily (Patient taking differently: Take 2 tablets by mouth 2 times daily as needed) 30 tablet 0     spironolactone (ALDACTONE) 25 MG tablet Take 2 tablets (50 mg) by mouth daily 180 tablet 3     Vaginal Lubricant (REPHRESH) GEL Place 2 g vaginally every 3 days 2 g 3          Allergies   Allergen Reactions     Erythromycin Nausea     Penicillins Hives     Around age 6 - doesn't recall full reaction, mother told her it was hives.     Has tolerated cephalsporins.        Family History   Problem Relation Age of Onset     Neurologic Disorder Mother         Anuerysm of Cerebral Artery, Dementia     Diabetes Mother      Thyroid Disease Mother         ,     Cerebrovascular Disease Mother      Dementia Mother      Osteoporosis Mother      Heart Disease Father         AAA     Hypertension Father      Circulatory Brother         Perihperal Neurophathy     Diabetes Maternal Grandmother      Asthma Maternal Grandmother      Chronic Obstructive Pulmonary Disease Maternal Grandfather         father     Asthma Maternal Grandfather      Diabetes Maternal Aunt         x2     Melanoma Maternal Aunt      Glaucoma Maternal Aunt      Breast Cancer Cousin      Dementia Other      Cancer Other         malignant melanoma     Hypertension Other      Hypertension Other      Cerebrovascular Disease Other      Cerebrovascular Disease Other      Obesity Other       Respiratory Other      Cancer Other      Diabetes Other      Asthma Other      Macular Degeneration No family hx of      Coronary Artery Disease No family hx of      Hyperlipidemia No family hx of      Kidney Disease No family hx of      Thrombosis No family hx of      Arthritis No family hx of      Depression No family hx of      Mental Illness No family hx of      Substance Abuse No family hx of      Cystic Fibrosis No family hx of      Early Death No family hx of      Coronary Artery Disease Early Onset No family hx of      Heart Failure No family hx of      Bleeding Diathesis No family hx of      Ovarian Cancer No family hx of      Uterine Cancer No family hx of      Prostate Cancer No family hx of      Colorectal Cancer No family hx of      Pancreatic Cancer No family hx of      Lung Cancer No family hx of      Other Cancer No family hx of      Autoimmune Disease No family hx of      Unknown/Adopted No family hx of      Genetic Disorder No family hx of      Bleeding Disorder No family hx of      Clotting Disorder No family hx of      Anesthesia Reaction No family hx of         Social history:  Living situation: , lives in [place]. She is planning a remodel.   Occupation: Retired from [employer]  Tobacco use:   Tobacco Use      Smoking status: Never      Smokeless tobacco: Never  Uses marijuana (water filtered) for pain control  ETOH use: 0-3 drinks a week  Exercise: Getting 150 min of exercise a week (PT, pilates, bike)  Diet: Healthy    Video physical exam  BMI 28 11/2/22  General: Patient appears well in no acute distress.   Skin: No visualized rash or lesions on visualized skin  Eyes: EOMI, no erythema, sclera icterus or discharge noted  Resp: Appears to be breathing comfortably without accessory muscle usage, speaking in full sentences, no cough  MSK: Appears to have normal range of motion based on visualized movements  Neurologic: No apparent tremors, facial movements symmetric  Psych:  affect versatile, alert and oriented    IMPRESSION/PLAN:    History of left-sided stage IIIC inflammatory breast cancer, ER/TN positive, HER2 negative, diagnosed in 2007.  Treated with neoadjuvant chemo, bilateral mastectomy, radiation, and 14 years of endocrine therapy.   15 years out without evidence of recurrence.   No routine surveillance scans or tumor markers indicated but I have a low threshold to evaluate new complaints.   Annual cancer survivorship visit until she is 20 years out then prn. Next year she prefers in person.     Lymphedema: Ongoing issue. She would like a referral back to PT.     Peripheral neuropathy: precedes cancer diagnosis. She would like a referral for evaluation. She is plugged into neurology but they are managing her dizziness. She is interested in seeing PM&R.     Potential late effects related to chemotherapy:  -Cardiotoxicity. Given her exposure to an anthracycline and radiation including the left chest, she may be at risk for cardiomyopathy, arrhythmias, and left ventricular dysfunction. A post-treatment echo has been performed in 2019 (EF 61%).  She should continue to follow-up routinely with her PCP for lipid testing, and monitoring of blood pressure and blood glucose, with treatment as indicated.    Potential late effects related to radiation:  - Possible radiation side effects include cardiotoxicity, lung injury, fracture, and second malignancies. She should seek medical attention if she notices any new skin lesions in the area of radiation. If she should develop any shortness of breath, hemoptysis, cough, or new pain, I would advise further evaluation with CT or X-ray.     Potential effects related to endocrine therapy:  Risk of developing osteoporosis: Last DEXA 11/2021 with a lowest T-score of -1.4, Recommend weightbearing exercises 2-3 days per week, and to supplement with 1200 mg of calcium, 1000 international units of vitamin D daily. S/p fosamax and 4 years of IV  bisphosphonate. She reports a history of Prolia and is now on Evenity managed by sports medicine.      General late effects screenings and recommendations    -Cancer screening. She should undergo routine screening for women in her age group.     -Healthy lifestyle. She should maintain a healthy weight with a BMI between 20-25. She should exercise at least 150 minutes weekly at moderate intensity. She should see the eye doctor every 1-2 years, and dentist every 6 months for cleanings. She should not use any tobacco. She should minimize alcohol intake. If continuing to drink, should follow CDC recommendations for no more than 1 alcoholic drink/day for women.    Non-urgent scheduling should be complete within 7-10 business days. However, if you have not received a phone call from scheduling within 3 business days, you may call to schedule at [phone] option 5, option 2. This is the same number to call to make changes tor if you have questions about the schedule.    Gave resources for our [facility] Cancer Survivorship class series and mailings list for educational opportunities. [email or web address]    Cancer treatment summary was sent electronically to the patient and her PCP.      The total time of this encounter amounted to 60 minutes.This time included time spent with the patient, prep work, ordering tests, creating a cancer treatment summary, and performing post visit documentation.    MEERA Anaya, SAIDA  [facility] Cancer Survivorship [facility]

## 2022-11-16 ENCOUNTER — OFFICE VISIT (OUTPATIENT)
Dept: PALLIATIVE MEDICINE | Facility: OTHER | Age: 72
End: 2022-11-16
Payer: COMMERCIAL

## 2022-11-16 DIAGNOSIS — M54.50 CHRONIC LOW BACK PAIN: Primary | ICD-10-CM

## 2022-11-16 DIAGNOSIS — M54.59 OTHER LOW BACK PAIN: ICD-10-CM

## 2022-11-16 DIAGNOSIS — G89.29 CHRONIC LOW BACK PAIN: Primary | ICD-10-CM

## 2022-11-16 PROCEDURE — 97811 ACUP 1/> W/O ESTIM EA ADD 15: CPT | Performed by: ACUPUNCTURIST

## 2022-11-16 PROCEDURE — 97810 ACUP 1/> WO ESTIM 1ST 15 MIN: CPT | Performed by: ACUPUNCTURIST

## 2022-11-16 NOTE — PROGRESS NOTES
ACUPUNCTURIST TREATMENT NOTE      Nadira Perez, a 72 year old female, is here today for Follow - Up exam. Patient is referred by Ariannaiwe.    HPI  Main Complaint: Chronic low back pain with (L) SI joint pain  Secondary Complaints:     Past Medical History  Past Medical History Reviewed: No   has a past medical history of Arthritis, Bone disease, Breast cancer (H), H/O kyphoplasty, Hearing problem, History of kidney stones, History of radiation therapy, Hyperlipemia, Hypertension, Hypopotassemia, Kidney problem, Lymph edema, Medullary sponge kidney, Osteopenia, PONV (postoperative nausea and vomiting), Reduced vision, Squamous cell skin cancer, and Thyroid disease.    Objective  Basic Exam Completed:   Constitutional: Well Groomed and Normal Weight and Normal Appearance    TCM Exam Completed: Yes   Limbs/Back: Lower Back    Tongue/Pulse Exam Completed: No    Patient Assessment  Patient Type: Pain  Patient Complaint: Chronic low back pain with (L) SI joint pain    Acupuncture 11/10/2022 11/16/2022   Intervention Reason Pain Pain   Pain Location low back low back   Pre-session Pain Rating 4 2   Post-session Pain Rating 3 2       TCM Diagnosis: Qi Stagnation;Blood Stagnation;Kidney Qi Deficiency      Treatment Principle: channel obstruction of Sreekanth Colon and Billy Colon; heart fire    TCM / Acupuncture Treatment  Acupuncture Points:       Initial insertions: Mariann @ L4, 5, Raj deysi, (L) Gb 30, (L) Bl 31, 32       Second insertions: Du 20, (R) Si 3, (R) Ling gu, (L) Bl 60, 62, 63, 65            Number of needles inserted: 16  Number of needles removed: 16    Accessory Techniques 11/10/2022 11/16/2022   Accessory Techniques TDP Heat Lamp; Tuina; Topicals TDP Heat Lamp   TDP Heat Lamp location used low back low back   Tuina location used low back low back   Topicals Po Sum On -   Topicals location used low back -     Oswestry Disability Index (JOHN   Nish Weeks 1980, All rights reserved) 8/8/2022 11/3/2022   Section  1 - Pain intensity The pain is moderate at the moment. The pain is moderate at the moment.   Section 2 - Personal care (washing, dressing, etc.)  I need some help but manage most of my personal care. I can look after myself normally but it is very painful.   Section 3 - Lifting I can only lift very light weights. I can only lift very light weights.   Section 4 - Walking I can only walk using a cane or crutches. Pain prevents me from walking more than a quarter of a mile.   Section 5 - Sitting Pain prevents me from sitting for more than half an hour. Pain prevents me from sitting for more than 1 hour.   Section 6 - Standing Pain prevents me from standing for more than 10 minutes. Pain prevents me from standing for more than half an hour.   Section 7 - Sleeping Because of pain I have less than 4 hours sleep. Because of pain I have less than 6 hours sleep.   Section 8 - Sex life (if applicable) Not answered/NA Not answered/NA   Section 9 - Social life Pain has restricted my social life and I do not go out as often. Pain has restricted my social life and I do not go out as often.   Section 10 - Traveling I can travel anywhere without pain. I can travel anywhere without pain.   Oswestry Score (%) 57.78 42.22       Assessment and Plan  Treatment Observations:    Acupuncture Treatment Recommendations:         It is my recommendation that this patient seek advice from their Primary Care Provider about active symptoms not addressed during this visit. The risks and benefits of acupuncture were reviewed and the patient stated understanding. The patient's questions were answered to their satisfaction. Consent was provided for treatment. We thank you for the referral and opportunity to treat this patient.    Time Spent with Patient:   I spent a total of 30 minutes face-to-face with Nadira Perez during today's office visit.     Ponce Trejo

## 2022-11-21 ENCOUNTER — OFFICE VISIT (OUTPATIENT)
Dept: PALLIATIVE MEDICINE | Facility: OTHER | Age: 72
End: 2022-11-21
Payer: COMMERCIAL

## 2022-11-21 DIAGNOSIS — Z98.1 S/P SPINAL FUSION: ICD-10-CM

## 2022-11-21 DIAGNOSIS — M54.50 CHRONIC LOW BACK PAIN: Primary | ICD-10-CM

## 2022-11-21 DIAGNOSIS — G89.29 CHRONIC LOW BACK PAIN: Primary | ICD-10-CM

## 2022-11-21 DIAGNOSIS — M54.59 OTHER LOW BACK PAIN: ICD-10-CM

## 2022-11-21 PROCEDURE — 97810 ACUP 1/> WO ESTIM 1ST 15 MIN: CPT | Performed by: ACUPUNCTURIST

## 2022-11-21 PROCEDURE — 97811 ACUP 1/> W/O ESTIM EA ADD 15: CPT | Performed by: ACUPUNCTURIST

## 2022-11-21 NOTE — PROGRESS NOTES
ACUPUNCTURIST TREATMENT NOTE      Nadira Perez, a 72 year old female, is here today for Follow - Up exam. Patient is referred by Anyiwe. Treatment 6.    HPI  Main Complaint:  Chronic low back pain, L sided: Pain comes and goes. Has not had as many spasms, over all feels she has been better. Reports after last treatment improvement for a day.    Has not had pain wake her up at night, but does wake up to urinate. Generally able to fall back to sleep, sometimes difficult to fall back to sleep.      Secondary Complaints: Neuropathy in feet, paresthesia in lateral legs: Still about the same.    Past Medical History  Past Medical History Reviewed: Yes   has a past medical history of Arthritis, Bone disease, Breast cancer (H), H/O kyphoplasty, Hearing problem, History of kidney stones, History of radiation therapy, Hyperlipemia, Hypertension, Hypopotassemia, Kidney problem, Lymph edema, Medullary sponge kidney, Osteopenia, PONV (postoperative nausea and vomiting), Reduced vision, Squamous cell skin cancer, and Thyroid disease.    Objective  Basic Exam Completed:   No    TCM Exam Completed: Yes   Energy: Medium  Sleep: getting up to go to the bathroom  Limbs/Back: Lower Back    Tongue/Pulse Exam Completed: No    Patient Assessment  Patient Type: Pain  Patient Complaint: Chronic low back pain; L SI joint pain; neuropathy in feet    Acupuncture 11/16/2022 11/21/2022   Intervention Reason Pain Pain   Pain Location low back low back   Pre-session Pain Rating 2 4   Post-session Pain Rating 2 2       TCM Diagnosis: Qi Stagnation;Blood Stagnation;Kidney Qi Deficiency      Treatment Principle: channel obstruction of Sreekanth Colon and Billy Colon; heart fire    TCM / Acupuncture Treatment  Acupuncture Points:       Initial insertions: HTJJ L2-L4, Shiqimulu, Yaoyivette, Ub26-28, Ub31-32       Second insertions: Baihui, Ub62, Ub60, Ub57, Gb34. Left: Gb29, Ub53, Ub54                    Accessory Techniques 11/16/2022 11/21/2022   Accessory  Techniques TDP Heat Lamp TDP Heat Lamp; Tuina; Topicals   TDP Heat Lamp location used low back low back   Tuina location used low back low back   Topicals - Po Sum On   Topicals location used - low back     Oswestry Disability Index (JOHN   Nish Micky 1980, All rights reserved) 8/8/2022 11/3/2022   Section 1 - Pain intensity The pain is moderate at the moment. The pain is moderate at the moment.   Section 2 - Personal care (washing, dressing, etc.)  I need some help but manage most of my personal care. I can look after myself normally but it is very painful.   Section 3 - Lifting I can only lift very light weights. I can only lift very light weights.   Section 4 - Walking I can only walk using a cane or crutches. Pain prevents me from walking more than a quarter of a mile.   Section 5 - Sitting Pain prevents me from sitting for more than half an hour. Pain prevents me from sitting for more than 1 hour.   Section 6 - Standing Pain prevents me from standing for more than 10 minutes. Pain prevents me from standing for more than half an hour.   Section 7 - Sleeping Because of pain I have less than 4 hours sleep. Because of pain I have less than 6 hours sleep.   Section 8 - Sex life (if applicable) Not answered/NA Not answered/NA   Section 9 - Social life Pain has restricted my social life and I do not go out as often. Pain has restricted my social life and I do not go out as often.   Section 10 - Traveling I can travel anywhere without pain. I can travel anywhere without pain.   Oswestry Score (%) 57.78 42.22       Assessment and Plan  Treatment Observations:    Acupuncture Treatment Recommendations: Diagnoses for treatment: Z98.1 (ICD-10-CM) - S/P spinal fusion  M54.50, G89.29 (ICD-10-CM) - Chronic low back pain, Other low back pain.       It is my recommendation that this patient seek advice from their Primary Care Provider about active symptoms not addressed during this visit. The risks and benefits of  acupuncture were reviewed and the patient stated understanding. The patient's questions were answered to their satisfaction. Consent was provided for treatment. We thank you for the referral and opportunity to treat this patient.    Time Spent with Patient:   I spent a total of 30 minutes face-to-face with Nadira Perez during today's office visit.     Zara Rosa L.Ac.  11/21/22

## 2022-11-28 ENCOUNTER — OFFICE VISIT (OUTPATIENT)
Dept: PALLIATIVE MEDICINE | Facility: OTHER | Age: 72
End: 2022-11-28
Payer: COMMERCIAL

## 2022-11-28 DIAGNOSIS — M54.50 CHRONIC BILATERAL LOW BACK PAIN, UNSPECIFIED WHETHER SCIATICA PRESENT: Primary | ICD-10-CM

## 2022-11-28 DIAGNOSIS — G89.29 CHRONIC BILATERAL LOW BACK PAIN, UNSPECIFIED WHETHER SCIATICA PRESENT: Primary | ICD-10-CM

## 2022-11-28 DIAGNOSIS — M54.59 OTHER LOW BACK PAIN: ICD-10-CM

## 2022-11-28 PROCEDURE — 97810 ACUP 1/> WO ESTIM 1ST 15 MIN: CPT | Performed by: ACUPUNCTURIST

## 2022-11-28 PROCEDURE — 97811 ACUP 1/> W/O ESTIM EA ADD 15: CPT | Performed by: ACUPUNCTURIST

## 2022-11-28 NOTE — PROGRESS NOTES
ACUPUNCTURIST TREATMENT NOTE      Nadira Perez, a 72 year old female, is here today for Follow - Up exam. Patient is referred by [facility].    HPI  Main Complaint: Chronic low back pain  Secondary complaint: neuropathy in bilateral feet and paresthesia in lower legs  Follow-up: Patient denies significant improvement with her low back pain at this point in treatment.  She reports that her (R) side was more painful than her (L) this morning but her (L) side is usually the more painful side.  She explains that she only notices the paraesthesia in her legs when she touches them.  The neuropathy symptoms in her feet she explains are painful if there is pressure on the lateral side of her foot and especially her toes.  She explains that she can feel pressure at the bottom of her feet but she lacks sensation.  She denies that the neuropathy in her feet is affecting her balance.    Past Medical History  Past Medical History Reviewed: Yes   has a past medical history of Arthritis, Bone disease, Breast cancer (H), H/O kyphoplasty, Hearing problem, History of kidney stones, History of radiation therapy, Hyperlipemia, Hypertension, Hypopotassemia, Kidney problem, Lymph edema, Medullary sponge kidney, Osteopenia, PONV (postoperative nausea and vomiting), Reduced vision, Squamous cell skin cancer, and Thyroid disease.    Objective  Basic Exam Completed:   No    TCM Exam Completed: Yes   Limbs/Back: Bilateral Lower Extremity and Lower Back    Tongue/Pulse Exam Completed: Yes           Patient Assessment  Patient Type: Pain  Patient Complaint: Chronic low back pain; L SI joint pain; neuropathy in feet    Acupuncture 11/21/2022 11/28/2022   Intervention Reason Pain Pain   Pain Location low back low back   Pre-session Pain Rating 4 4   Post-session Pain Rating 2 2       TCM Diagnosis: Qi Stagnation;Blood Stagnation;Kidney Qi Deficiency      Treatment Principle: channel obstruction of Sreekanth Colon and Billy Colon; heart fire    TCM /  Acupuncture Treatment  Acupuncture Points:       Initial insertions: Du 20, scalp x4, HTJJ L2-L5; Raj Tomas, GB 30, Scott Larrya       Second insertions: GB 34, BL 57, BL 60, BL 62, BL 65                    Accessory Techniques 11/21/2022 11/28/2022   Accessory Techniques TDP Heat Lamp; Tuina; Topicals TDP Heat Lamp   TDP Heat Lamp location used low back low back   Tuina location used low back -   Topicals Po Sum On -   Topicals location used low back -     Oswestry Disability Index (JOHN   Nish Weeks 1980, All rights reserved) 8/8/2022 11/3/2022   Section 1 - Pain intensity The pain is moderate at the moment. The pain is moderate at the moment.   Section 2 - Personal care (washing, dressing, etc.)  I need some help but manage most of my personal care. I can look after myself normally but it is very painful.   Section 3 - Lifting I can only lift very light weights. I can only lift very light weights.   Section 4 - Walking I can only walk using a cane or crutches. Pain prevents me from walking more than a quarter of a mile.   Section 5 - Sitting Pain prevents me from sitting for more than half an hour. Pain prevents me from sitting for more than 1 hour.   Section 6 - Standing Pain prevents me from standing for more than 10 minutes. Pain prevents me from standing for more than half an hour.   Section 7 - Sleeping Because of pain I have less than 4 hours sleep. Because of pain I have less than 6 hours sleep.   Section 8 - Sex life (if applicable) Not answered/NA Not answered/NA   Section 9 - Social life Pain has restricted my social life and I do not go out as often. Pain has restricted my social life and I do not go out as often.   Section 10 - Traveling I can travel anywhere without pain. I can travel anywhere without pain.   Oswestry Score (%) 57.78 42.22       Assessment and Plan  Treatment Observations:    Acupuncture Treatment Recommendations: Diagnoses for treatment: Z98.1 (ICD-10-CM) - S/P spinal fusion  M54.50,  G89.29 (ICD-10-CM) - Chronic low back pain, Other low back pain.       It is my recommendation that this patient seek advice from their Primary Care Provider about active symptoms not addressed during this visit. The risks and benefits of acupuncture were reviewed and the patient stated understanding. The patient's questions were answered to their satisfaction. Consent was provided for treatment. We thank you for the referral and opportunity to treat this patient.    Time Spent with Patient:   I spent a total of 30 minutes face-to-face with Nadira Perez during today's office visit.     Daphne Kothari L.Ac.

## 2022-11-30 ENCOUNTER — OFFICE VISIT (OUTPATIENT)
Dept: ORTHOPEDICS | Facility: CLINIC | Age: 72
End: 2022-11-30
Payer: COMMERCIAL

## 2022-11-30 VITALS — WEIGHT: 184 LBS | HEIGHT: 67 IN | BODY MASS INDEX: 28.88 KG/M2

## 2022-11-30 DIAGNOSIS — M81.0 OSTEOPOROSIS, UNSPECIFIED OSTEOPOROSIS TYPE, UNSPECIFIED PATHOLOGICAL FRACTURE PRESENCE: Primary | ICD-10-CM

## 2022-11-30 DIAGNOSIS — E55.9 HYPOVITAMINOSIS D: ICD-10-CM

## 2022-11-30 PROCEDURE — 99207 PR DROP WITH A PROCEDURE: CPT | Performed by: FAMILY MEDICINE

## 2022-11-30 PROCEDURE — 96372 THER/PROPH/DIAG INJ SC/IM: CPT | Performed by: FAMILY MEDICINE

## 2022-11-30 NOTE — PROGRESS NOTES
"  Assessment & Plan     Osteoporosis, unspecified osteoporosis type, unspecified pathological fracture presence  Evenity #8  - romosozumab-aqqg (EVENITY) injection 210 mg             BMI:   Estimated body mass index is 28.82 kg/m  as calculated from the following:    Height as of this encounter: 1.702 m (5' 7\").    Weight as of this encounter: 83.5 kg (184 lb).       See Patient Instructions    Return in about 4 weeks (around 12/28/2022), or if symptoms worsen or fail to improve.    Cortney Clark MD  [facility] SPORTS MEDICINE [facility] THIERRY Guevara is a 72 year old accompanied by her self, presenting for the following health issues:  RECHECK (Evenity Injection )      HPI     Pt is a 71 yo white female with PMhx of cholelithiasis of the lumbar region presenting for Evenity No. 8  Patient reports no side effects regarding bone pain or headaches    Review of Systems   Constitutional, HEENT, cardiovascular, pulmonary, gi and gu systems are negative, except as otherwise noted.      Objective    Ht 1.702 m (5' 7\")   Wt 83.5 kg (184 lb)   BMI 28.82 kg/m    Body mass index is 28.82 kg/m .  Physical Exam   NAD  nonlabored breathing                  Procedure: Some benefits discussed and patient was consented.  Chloroprep used to clean the area of the skin and ethyl chloride to anesthetize the area.  1 injected each lateral thigh.  Band aids in place.  Well tolerated.  "

## 2022-11-30 NOTE — NURSING NOTE
[address] [address]  Dept: [phone]  ______________________________________________________________________________    Patient: Nadira Perez   : 1950   MRN: [id number]   2022    INVASIVE PROCEDURE SAFETY CHECKLIST    Date: 2022   Procedure: Bilateral Subcutaneous Evenity injection #8  Patient Name: Nadira Perez  MRN: [id number]  YOB: 1950    Action: Complete sections as appropriate. Any discrepancy results in a HARD COPY until resolved.     PRE PROCEDURE:  Patient ID verified with 2 identifiers (name and  or MRN): Yes  Procedure and site verified with patient/designee (when able): Yes  Accurate consent documentation in medical record: Yes  H&P (or appropriate assessment) documented in medical record: Yes  H&P must be up to 20 days prior to procedure and updates within 24 hours of procedure as applicable: NA  Relevant diagnostic and radiology test results appropriately labeled and displayed as applicable: NA  Procedure site(s) marked with provider initials: NA    TIMEOUT:  Time-Out performed immediately prior to starting procedure, including verbal and active participation of all team members addressing the following:Yes  * Correct patient identify  * Confirmed that the correct side and site are marked  * An accurate procedure consent form  * Agreement on the procedure to be done  * Correct patient position  * Relevant images and results are properly labeled and appropriately displayed  * The need to administer antibiotics or fluids for irrigation purposes during the procedure as applicable   * Safety precautions based on patient history or medication use    DURING PROCEDURE: Verification of correct person, site, and procedures any time the responsibility for care of the patient is transferred to another member of the care team.       Prior to injection, verified patient identity using  patient's name and date of birth.  Due to injection administration, patient instructed to remain in clinic for 15 minutes  afterwards, and to report any adverse reaction to me immediately.    Evenity (romosozumab-aqqg)    Drug Amount Wasted:  None.  Vial/Syringe: Syringe  Expiration Date:  01/01/2025    Montserrat Arriaga, ATC  November 30, 2022

## 2022-11-30 NOTE — LETTER
"  11/30/2022      RE: Nadira Perez  [address]  [facility] [address]     Dear Colleague,    Thank you for referring your patient, Nadira Perez, to the [facility]. Please see a copy of my visit note below.      Assessment & Plan     Osteoporosis, unspecified osteoporosis type, unspecified pathological fracture presence  Evenity #8  - romosozumab-aqqg (EVENITY) injection 210 mg             BMI:   Estimated body mass index is 28.82 kg/m  as calculated from the following:    Height as of this encounter: 1.702 m (5' 7\").    Weight as of this encounter: 83.5 kg (184 lb).       See Patient Instructions    Return in about 4 weeks (around 12/28/2022), or if symptoms worsen or fail to improve.    Cortney Clark MD  [facility]    Tom Guevara is a 72 year old accompanied by her self, presenting for the following health issues:  RECHECK (Evenity Injection )      HPI     Pt is a 73 yo white female with PMhx of cholelithiasis of the lumbar region presenting for Evenity No. 8  Patient reports no side effects regarding bone pain or headaches    Review of Systems   Constitutional, HEENT, cardiovascular, pulmonary, gi and gu systems are negative, except as otherwise noted.     Objective    Ht 1.702 m (5' 7\")   Wt 83.5 kg (184 lb)   BMI 28.82 kg/m    Body mass index is 28.82 kg/m .  Physical Exam   NAD  nonlabored breathing        Procedure: Some benefits discussed and patient was consented.  Chloroprep used to clean the area of the skin and ethyl chloride to anesthetize the area.  1 injected each lateral thigh.  Band aids in place.  Well tolerated.      Again, thank you for allowing me to participate in the care of your patient.      Sincerely,    Cortney Clark MD    "

## 2022-12-01 DIAGNOSIS — M25.572 LEFT ANKLE PAIN: Primary | ICD-10-CM

## 2022-12-01 NOTE — TELEPHONE ENCOUNTER
DIAGNOSIS: Left ankle pain    APPOINTMENT DATE: 12.2.22   NOTES STATUS DETAILS   OFFICE NOTE from other specialist Internal Internal:  4.29.22 Morgan  4.7.22   1.17.22    3.8.22 Govind    PT in epic   MEDICATION LIST Internal    XRAYS (IMAGES & REPORTS) Internal 4.29.22 L foot  4.7.22 L foot  1.17.22 L ankle  7.22.19 L toe

## 2022-12-02 ENCOUNTER — ANCILLARY PROCEDURE (OUTPATIENT)
Dept: GENERAL RADIOLOGY | Facility: CLINIC | Age: 72
End: 2022-12-02
Attending: FAMILY MEDICINE
Payer: COMMERCIAL

## 2022-12-02 ENCOUNTER — OFFICE VISIT (OUTPATIENT)
Dept: ORTHOPEDICS | Facility: CLINIC | Age: 72
End: 2022-12-02
Payer: COMMERCIAL

## 2022-12-02 ENCOUNTER — PRE VISIT (OUTPATIENT)
Dept: ORTHOPEDICS | Facility: CLINIC | Age: 72
End: 2022-12-02

## 2022-12-02 VITALS — WEIGHT: 184 LBS | HEIGHT: 67 IN | BODY MASS INDEX: 28.88 KG/M2

## 2022-12-02 DIAGNOSIS — M25.572 LEFT ANKLE PAIN: ICD-10-CM

## 2022-12-02 DIAGNOSIS — S82.65XA CLOSED NONDISPLACED FRACTURE OF LATERAL MALLEOLUS OF LEFT FIBULA, INITIAL ENCOUNTER: Primary | ICD-10-CM

## 2022-12-02 LAB — RADIOLOGIST FLAGS: NORMAL

## 2022-12-02 PROCEDURE — 73610 X-RAY EXAM OF ANKLE: CPT | Mod: LT | Performed by: RADIOLOGY

## 2022-12-02 PROCEDURE — 99214 OFFICE O/P EST MOD 30 MIN: CPT | Performed by: FAMILY MEDICINE

## 2022-12-02 RX ORDER — ACETAMINOPHEN 160 MG
TABLET,DISINTEGRATING ORAL
Qty: 270 CAPSULE | Refills: 3 | Status: SHIPPED | OUTPATIENT
Start: 2022-12-02 | End: 2023-12-01

## 2022-12-02 RX ORDER — HYDROCODONE BITARTRATE AND ACETAMINOPHEN 5; 325 MG/1; MG/1
1 TABLET ORAL EVERY 6 HOURS PRN
Qty: 10 TABLET | Refills: 0 | Status: SHIPPED | OUTPATIENT
Start: 2022-12-02 | End: 2022-12-05

## 2022-12-02 NOTE — PROGRESS NOTES
Subjective:   Nadira Perez is a 72 year old female who was dropping off lavender plant for neighbor.  Fell straight down and left knee went out.    Second class for abdominal pilates  Right leg constricted feeling around the anterior hips wondering if Evenity contributing  Doesn't believe she's sitting more  Has neuropathy    Background:   Date of injury: 12/1/22   Duration of symptoms: 1 days  Mechanism of Injury: Acute; Activity Related She was walking and slipped on ice and fell.  Intensity: 4/10 at rest, 8/10 with activity   Aggravating factors: Walking, movement  Relieving Factors: rest, ice and NSAIDs  Prior Evaluation: X-rays    PAST MEDICAL, SOCIAL, SURGICAL AND FAMILY HISTORY: She  has a past medical history of Arthritis, Bone disease, Breast cancer (H), H/O kyphoplasty, Hearing problem, History of kidney stones, History of radiation therapy, Hyperlipemia, Hypertension, Hypopotassemia, Kidney problem, Lymph edema, Medullary sponge kidney, Osteopenia, PONV (postoperative nausea and vomiting), Reduced vision, Squamous cell skin cancer, and Thyroid disease.    She has no past medical history of Basal cell carcinoma, Diabetes (H), Malignant melanoma (H), or Squamous cell carcinoma.  She  has a past surgical history that includes Parathyroidectomy (09/23/2004); Rhinoplasty (1968); thyr proc skin closed cosmetic manner by subcuticular stitch (01/23/2009); Mastectomy modified radical (Bilateral); hernia repair; Wrist surgery; Tonsillectomy (1977); Hysterectomy total abdominal (05/03/2000); Parathyroidectomy (09/23/2004); Arthrodesis wrist (Right); External ear surgery; Arthrodesis wrist (02/14/2013); Graft bone from iliac crest (02/14/2013); Remove hardware hand (09/24/2013); Colonoscopy (12/24/2013); Abdomen surgery; Eye surgery; biopsy; BREATH HYDROGEN TEST (N/A, 10/14/2016); Esophagoscopy, gastroscopy, duodenoscopy (EGD), combined (N/A, 11/23/2016); cataract iol, rt/lt (Right, 03/13/2018); cataract iol,  rt/lt (Left, 02/20/2018); Thyroplasty (10/09/2009); Fusion, Spine, Interbody, Oblique Anterior And Lumbar, 2 Levels, Post Approach, Using Ots (N/A, 11/18/2021); Optical tracking system fusion spine posterior lumbar one level (N/A, 11/18/2021); Release carpal tunnel (Right, 12/2/2021); Blepharoplasty bilateral (Bilateral, 5/6/2022); Cosmetic blepharoplasty lower lids bilateral (Bilateral, 5/6/2022); and Vitrectomy parsplana with 25 gauge system (Left, 6/20/2022).  Her family history includes Asthma in her maternal grandfather, maternal grandmother, and another family member; Breast Cancer in her cousin; Cancer in some other family members; Cerebrovascular Disease in her mother and other family members; Chronic Obstructive Pulmonary Disease in her maternal grandfather; Circulatory in her brother; Dementia in her mother and another family member; Diabetes in her maternal aunt, maternal grandmother, mother, and another family member; Glaucoma in her maternal aunt; Heart Disease in her father; Hypertension in her father and other family members; Melanoma in her maternal aunt; Neurologic Disorder in her mother; Obesity in an other family member; Osteoporosis in her mother; Respiratory in an other family member; Thyroid Disease in her mother.  She reports that she has never smoked. She has never used smokeless tobacco. She reports that she does not currently use alcohol after a past usage of about 7.0 standard drinks per week. She reports current drug use. Drug: Marijuana.    ALLERGIES: She is allergic to erythromycin and penicillins.    CURRENT MEDICATIONS: She has a current medication list which includes the following prescription(s): acetaminophen, amlodipine, vitamin c, atorvastatin, vitamin d3, cranberry, diclofenac, gabapentin, hydrocodone-acetaminophen, hydroxyzine, ibuprofen, levothyroxine, lisinopril, magnesium, melatonin, methocarbamol, metoprolol succinate er, multi-vitamin, naloxone, ondansetron,  "senna-docusate, spironolactone, and rephresh, and the following Facility-Administered Medications: dexamethasone, lidocaine, lidocaine, lidocaine, lidocaine, lidocaine, romosozumab-aqqg, romosozumab-aqqg, romosozumab-aqqg, romosozumab-aqqg, romosozumab-aqqg, romosozumab-aqqg, romosozumab-aqqg, romosozumab-aqqg, triamcinolone, triamcinolone, triamcinolone, and triamcinolone.     REVIEW OF SYSTEMS: 10 point review of systems is negative except as noted above.     Exam:   Ht 1.702 m (5' 7\")   Wt 83.5 kg (184 lb)   BMI 28.82 kg/m             CONSTITUTIONAL: alert, moderate distress and cooperative  HEAD: Normocephalic. No masses, lesions, tenderness or abnormalities  SKIN: ecchymosis, lateral left ankle swelling  GAIT: wheelchair  NEUROLOGIC: Non-focal  PSYCHIATRIC: affect normal/bright and mentation appears normal.    MUSCULOSKELETAL: left fibula fracture    ANKLE  Inspection/Palpation:     Swelling: ++ swelling   Tender: lateral malleolus     Non-tender: ATFL, CFL, PTFL, medial malleolus, deltoid ligament, anterior tib-fib ligament, achilles  tendon, no achilles tendon defect   Range of Motion: dorsiflexion: deCreased 4/5-painful, plantarflexion: decreased 4/5, inversion: full, eversion: full  Strength:dorsiflexion: 5/5, plantarflexion: 5/5, inversion: 5/5, eversion: 5/5   Special tests: negative anterior drawer, negative varus stress, negative valgus stress, negative forced external rotation, negative Cheek sign     FOOT  Inspection/Palpation:      Swelling: ++swelling- dorsal foot     Non-tender: promixal 5th metatarsal, 1st, 2nd, 3rd, 4th, 5th metatarsals, calcaneous, cuboid,  navicular, cuneiform lateral, cuneiform middle, cuneiform medial, metatarsal heads, peroneal tendon: at lateral malleolus, at cuboid, at proximal 5th metatarsal, posterior tibial tendon at medial malleolus, posterior tibial tendon at navicular, plantar fascia  Range of Motion: flexion of toes: full, extension of toes: full         " Assessment/Plan:   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  Left distal fibula fracture-  Cam boot and follow-up in 2 weeks for reimaging  Patient to use a walker for balance and to have assistance  Patient has helpful neighbors and states she'll have help    Return as scheduled for Evenity injection      X-RAY INTERPRETATION:   X-Ray of the Left Ankle: 3-view, ap, lateral, mortise   ordered and interpreted in the office today was positive for left distal fibular fracture

## 2022-12-02 NOTE — TELEPHONE ENCOUNTER
Cholecalciferol (VITAMIN D3) 50 MCG (2000 UT) CAPS    Vitamin Supplements (Adult) Protocol Passed   10/27/2022  [facility] Internal Medicine [place]   Matilde Quiñonez, [facility]  Internal Medicine

## 2022-12-02 NOTE — LETTER
12/2/2022      RE: Nadira Perez  [address]  [place] [address]     Dear Colleague,    Thank you for referring your patient, Nadira Perez, to the [facility] SPORTS MEDICINE CLINIC [place]. Please see a copy of my visit note below.     Subjective:   Nadira Perez is a 72 year old female who was dropping off lavender plant for neighbor.  Fell straight down and left knee went out.    Second class for abdominal pilates  Right leg constricted feeling around the anterior hips wondering if Evenity contributing  Doesn't believe she's sitting more  Has neuropathy    Background:   Date of injury: 12/1/22   Duration of symptoms: 1 days  Mechanism of Injury: Acute; Activity Related She was walking and slipped on ice and fell.  Intensity: 4/10 at rest, 8/10 with activity   Aggravating factors: Walking, movement  Relieving Factors: rest, ice and NSAIDs  Prior Evaluation: X-rays    PAST MEDICAL, SOCIAL, SURGICAL AND FAMILY HISTORY: She  has a past medical history of Arthritis, Bone disease, Breast cancer (H), H/O kyphoplasty, Hearing problem, History of kidney stones, History of radiation therapy, Hyperlipemia, Hypertension, Hypopotassemia, Kidney problem, Lymph edema, Medullary sponge kidney, Osteopenia, PONV (postoperative nausea and vomiting), Reduced vision, Squamous cell skin cancer, and Thyroid disease.    She has no past medical history of Basal cell carcinoma, Diabetes (H), Malignant melanoma (H), or Squamous cell carcinoma.  She  has a past surgical history that includes Parathyroidectomy (09/23/2004); Rhinoplasty (1968); thyr proc skin closed cosmetic manner by subcuticular stitch (01/23/2009); Mastectomy modified radical (Bilateral); hernia repair; Wrist surgery; Tonsillectomy (1977); Hysterectomy total abdominal (05/03/2000); Parathyroidectomy (09/23/2004); Arthrodesis wrist (Right); External ear surgery; Arthrodesis wrist (02/14/2013); Graft bone from iliac crest (02/14/2013);  Remove hardware hand (09/24/2013); Colonoscopy (12/24/2013); Abdomen surgery; Eye surgery; biopsy; BREATH HYDROGEN TEST (N/A, 10/14/2016); Esophagoscopy, gastroscopy, duodenoscopy (EGD), combined (N/A, 11/23/2016); cataract iol, rt/lt (Right, 03/13/2018); cataract iol, rt/lt (Left, 02/20/2018); Thyroplasty (10/09/2009); Fusion, Spine, Interbody, Oblique Anterior And Lumbar, 2 Levels, Post Approach, Using Ots (N/A, 11/18/2021); Optical tracking system fusion spine posterior lumbar one level (N/A, 11/18/2021); Release carpal tunnel (Right, 12/2/2021); Blepharoplasty bilateral (Bilateral, 5/6/2022); Cosmetic blepharoplasty lower lids bilateral (Bilateral, 5/6/2022); and Vitrectomy parsplana with 25 gauge system (Left, 6/20/2022).  Her family history includes Asthma in her maternal grandfather, maternal grandmother, and another family member; Breast Cancer in her cousin; Cancer in some other family members; Cerebrovascular Disease in her mother and other family members; Chronic Obstructive Pulmonary Disease in her maternal grandfather; Circulatory in her brother; Dementia in her mother and another family member; Diabetes in her maternal aunt, maternal grandmother, mother, and another family member; Glaucoma in her maternal aunt; Heart Disease in her father; Hypertension in her father and other family members; Melanoma in her maternal aunt; Neurologic Disorder in her mother; Obesity in an other family member; Osteoporosis in her mother; Respiratory in an other family member; Thyroid Disease in her mother.  She reports that she has never smoked. She has never used smokeless tobacco. She reports that she does not currently use alcohol after a past usage of about 7.0 standard drinks per week. She reports current drug use. Drug: Marijuana.    ALLERGIES: She is allergic to erythromycin and penicillins.    CURRENT MEDICATIONS: She has a current medication list which includes the following prescription(s): acetaminophen,  "amlodipine, vitamin c, atorvastatin, vitamin d3, cranberry, diclofenac, gabapentin, hydrocodone-acetaminophen, hydroxyzine, ibuprofen, levothyroxine, lisinopril, magnesium, melatonin, methocarbamol, metoprolol succinate er, multi-vitamin, naloxone, ondansetron, senna-docusate, spironolactone, and rephresh, and the following Facility-Administered Medications: dexamethasone, lidocaine, lidocaine, lidocaine, lidocaine, lidocaine, romosozumab-aqqg, romosozumab-aqqg, romosozumab-aqqg, romosozumab-aqqg, romosozumab-aqqg, romosozumab-aqqg, romosozumab-aqqg, romosozumab-aqqg, triamcinolone, triamcinolone, triamcinolone, and triamcinolone.     REVIEW OF SYSTEMS: 10 point review of systems is negative except as noted above.     Exam:   Ht 1.702 m (5' 7\")   Wt 83.5 kg (184 lb)   BMI 28.82 kg/m             CONSTITUTIONAL: alert, moderate distress and cooperative  HEAD: Normocephalic. No masses, lesions, tenderness or abnormalities  SKIN: ecchymosis, lateral left ankle swelling  GAIT: wheelchair  NEUROLOGIC: Non-focal  PSYCHIATRIC: affect normal/bright and mentation appears normal.    MUSCULOSKELETAL: left fibula fracture    ANKLE  Inspection/Palpation:     Swelling: ++ swelling   Tender: lateral malleolus     Non-tender: ATFL, CFL, PTFL, medial malleolus, deltoid ligament, anterior tib-fib ligament, achilles  tendon, no achilles tendon defect   Range of Motion: dorsiflexion: deCreased 4/5-painful, plantarflexion: decreased 4/5, inversion: full, eversion: full  Strength:dorsiflexion: 5/5, plantarflexion: 5/5, inversion: 5/5, eversion: 5/5   Special tests: negative anterior drawer, negative varus stress, negative valgus stress, negative forced external rotation, negative Cheek sign     FOOT  Inspection/Palpation:      Swelling: ++swelling- dorsal foot     Non-tender: promixal 5th metatarsal, 1st, 2nd, 3rd, 4th, 5th metatarsals, calcaneous, cuboid,  navicular, cuneiform lateral, cuneiform middle, cuneiform medial, metatarsal " heads, peroneal tendon: at lateral malleolus, at cuboid, at proximal 5th metatarsal, posterior tibial tendon at medial malleolus, posterior tibial tendon at navicular, plantar fascia  Range of Motion: flexion of toes: full, extension of toes: full         Assessment/Plan:   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  Left distal fibula fracture-  Cam boot and follow-up in 2 weeks for reimaging  Patient to use a walker for balance and to have assistance  Patient has helpful neighbors and states she'll have help    Return as scheduled for Evenity injection      X-RAY INTERPRETATION:   X-Ray of the Left Ankle: 3-view, ap, lateral, mortise   ordered and interpreted in the office today was positive for left distal fibular fracture      Again, thank you for allowing me to participate in the care of your patient.      Sincerely,    Cortney Clark MD

## 2022-12-05 ENCOUNTER — OFFICE VISIT (OUTPATIENT)
Dept: PALLIATIVE MEDICINE | Facility: OTHER | Age: 72
End: 2022-12-05
Payer: COMMERCIAL

## 2022-12-05 DIAGNOSIS — M54.59 OTHER LOW BACK PAIN: ICD-10-CM

## 2022-12-05 DIAGNOSIS — M54.50 CHRONIC BILATERAL LOW BACK PAIN, UNSPECIFIED WHETHER SCIATICA PRESENT: Primary | ICD-10-CM

## 2022-12-05 DIAGNOSIS — G89.29 CHRONIC BILATERAL LOW BACK PAIN, UNSPECIFIED WHETHER SCIATICA PRESENT: Primary | ICD-10-CM

## 2022-12-05 PROCEDURE — 97811 ACUP 1/> W/O ESTIM EA ADD 15: CPT | Performed by: ACUPUNCTURIST

## 2022-12-05 PROCEDURE — 97810 ACUP 1/> WO ESTIM 1ST 15 MIN: CPT | Performed by: ACUPUNCTURIST

## 2022-12-05 NOTE — PROGRESS NOTES
ACUPUNCTURIST TREATMENT NOTE      Nadira Perez, a 72 year old female, is here today for Follow - Up exam. Patient is referred by [facility].    HPI  Main Complaint: Chronic low back pain; L SI joint pain; neuropathy in feet  Secondary Complaints: acute (L) fibula fracture  Patient fell on Thursday last week 12/1/2022 and broke her (L) fibula.  She reports that she fell slowly and did not fall hard.  She denies that the fall aggravated her low back pain.  She reports that she has to move around slowly and she is not able to do her regular exercise or be as active as she usually is due to the fracture.  She is concerned about weight gain due to the limited mobility and her current inability to exercise.    Past Medical History  Past Medical History Reviewed: Yes   has a past medical history of Arthritis, Bone disease, Breast cancer (H), H/O kyphoplasty, Hearing problem, History of kidney stones, History of radiation therapy, Hyperlipemia, Hypertension, Hypopotassemia, Kidney problem, Lymph edema, Medullary sponge kidney, Osteopenia, PONV (postoperative nausea and vomiting), Reduced vision, Squamous cell skin cancer, and Thyroid disease.    Objective  Basic Exam Completed:   No    TCM Exam Completed: Yes   Limbs/Back: Left Lower Extremity and Lower Back    Tongue/Pulse Exam Completed: Yes           Patient Assessment  Patient Type: Pain  Patient Complaint: Chronic low back pain; L SI joint pain; neuropathy in feet; acute (L) fibula fracture    Acupuncture 11/28/2022 12/5/2022   Intervention Reason Pain Pain   Pain Location low back low back   Pre-session Pain Rating 4 4   Post-session Pain Rating 2 -       TCM Diagnosis: Qi Stagnation;Blood Stagnation;Kidney Qi Deficiency      Treatment Principle: channel obstruction of Sreekanth Colno and Billy Colon; heart fire    TCM / Acupuncture Treatment  Acupuncture Points:       Initial insertions: Du 20, scalp x5, HTJJ L1-L5; Raj Tomas, Glute medius and glute minimus       Second  insertions: GB 34, GB 41, (R) BL 62, (R) BL 60, (R) Sp 6                    Accessory Techniques 11/28/2022 12/5/2022   Accessory Techniques TDP Heat Lamp TDP Heat Lamp   TDP Heat Lamp location used low back low back   [place] location used - -   Topicals - -   Topicals location used - -     Oswestry Disability Index (JOHN   Nish Micky 1980, All rights reserved) 8/8/2022 11/3/2022   Section 1 - Pain intensity The pain is moderate at the moment. The pain is moderate at the moment.   Section 2 - Personal care (washing, dressing, etc.)  I need some help but manage most of my personal care. I can look after myself normally but it is very painful.   Section 3 - Lifting I can only lift very light weights. I can only lift very light weights.   Section 4 - Walking I can only walk using a cane or crutches. Pain prevents me from walking more than a quarter of a mile.   Section 5 - Sitting Pain prevents me from sitting for more than half an hour. Pain prevents me from sitting for more than 1 hour.   Section 6 - Standing Pain prevents me from standing for more than 10 minutes. Pain prevents me from standing for more than half an hour.   Section 7 - Sleeping Because of pain I have less than 4 hours sleep. Because of pain I have less than 6 hours sleep.   Section 8 - Sex life (if applicable) Not answered/NA Not answered/NA   Section 9 - Social life Pain has restricted my social life and I do not go out as often. Pain has restricted my social life and I do not go out as often.   Section 10 - Traveling I can travel anywhere without pain. I can travel anywhere without pain.   Oswestry Score (%) 57.78 42.22       Assessment and Plan  Treatment Observations:    Acupuncture Treatment Recommendations: Diagnoses for treatment: Z98.1 (ICD-10-CM) - S/P spinal fusion  M54.50, G89.29 (ICD-10-CM) - Chronic low back pain, Other low back pain.       It is my recommendation that this patient seek advice from their Primary Care Provider about  active symptoms not addressed during this visit. The risks and benefits of acupuncture were reviewed and the patient stated understanding. The patient's questions were answered to their satisfaction. Consent was provided for treatment. We thank you for the referral and opportunity to treat this patient.    Time Spent with Patient:   I spent a total of 30 minutes face-to-face with Nadira Perez during today's office visit.     Daphne Kothari L.Ac.

## 2022-12-07 ENCOUNTER — OFFICE VISIT (OUTPATIENT)
Dept: ORTHOPEDICS | Facility: CLINIC | Age: 72
End: 2022-12-07
Payer: COMMERCIAL

## 2022-12-07 ENCOUNTER — ANCILLARY PROCEDURE (OUTPATIENT)
Dept: GENERAL RADIOLOGY | Facility: CLINIC | Age: 72
End: 2022-12-07
Attending: FAMILY MEDICINE
Payer: COMMERCIAL

## 2022-12-07 DIAGNOSIS — M79.672 LEFT FOOT PAIN: Primary | ICD-10-CM

## 2022-12-07 DIAGNOSIS — M79.672 LEFT FOOT PAIN: ICD-10-CM

## 2022-12-07 DIAGNOSIS — Z53.9 ERRONEOUS ENCOUNTER--DISREGARD: ICD-10-CM

## 2022-12-07 DIAGNOSIS — M79.672 ACUTE FOOT PAIN, LEFT: Primary | ICD-10-CM

## 2022-12-07 PROCEDURE — 73630 X-RAY EXAM OF FOOT: CPT | Mod: LT | Performed by: STUDENT IN AN ORGANIZED HEALTH CARE EDUCATION/TRAINING PROGRAM

## 2022-12-07 PROCEDURE — 99214 OFFICE O/P EST MOD 30 MIN: CPT | Performed by: FAMILY MEDICINE

## 2022-12-07 NOTE — LETTER
12/7/2022      RE: Nadira Perez  [address]  [facility] [address]     Dear Colleague,    Thank you for referring your patient, Nadira Perez, to the [facility] [place]. Please see a copy of my visit note below.    CHIEF COMPLAINT:  Pain of the Left Foot       HISTORY OF PRESENT ILLNESS  Ms. Perez is a pleasant 72 year old female who presents to clinic today with left foot pain.  Ismael suffered a fall last week during which she struck her ankle and foot, she was seen last week by Dr. Clark and had an x-ray that revealed a distal fibula fracture.  She has been experiencing intense pain and swelling in her foot, she is wondering if her foot may be injured as well.        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     Corneal opacity     Hypothyroidism     Osteopenia, unspecified location     Aromatase inhibitor use     Chronic pain of right knee     DDD (degenerative disc disease), lumbar     Degenerative scoliosis in adult patient     Peripheral neuropathy     Spinal stenosis of lumbar region with neurogenic claudication     Spondylolisthesis of lumbar region     Brain lesion     Dermatochalasis of both upper eyelids     S/P spinal fusion     Chronic bilateral low back pain     PVD (posterior vitreous detachment), left eye     Vitreous opacities of left eye       Current Outpatient Medications   Medication  Sig Dispense Refill     acetaminophen (TYLENOL) 500 MG tablet Take 500-1,000 mg by mouth every 6 hours as needed for mild pain       amLODIPine (NORVASC) 10 MG tablet Take 1 tablet (10 mg) by mouth daily 90 tablet 3     Ascorbic Acid (VITAMIN C) 500 MG CHEW Take 1 tablet by mouth daily       atorvastatin (LIPITOR) 80 MG tablet Take 1 tablet (80 mg) by mouth daily 90 tablet 3     Cholecalciferol (VITAMIN D3) 50 MCG (2000 UT) CAPS TAKE 3 CAPSULES (6,000 UNITS) BY MOUTH DAILY. 270 capsule 3     CRANBERRY EXTRACT PO Take 650 mg by mouth daily ~10 am  Takes 1       diclofenac (VOLTAREN) 1 % topical gel APPLY 4 GRAMS TO KNEES OR 2 GRAMS TO HANDS FOUR TIMES DAILY USING ENCLOSED DOSING CARD. 100 g 3     gabapentin (NEURONTIN) 300 MG capsule Take 4 capsules (1,200 mg) by mouth 3 times daily 1080 capsule 3     HYDROcodone-acetaminophen (NORCO) 5-325 MG tablet Take 1 tablet by mouth every 6 hours as needed for severe pain (Patient taking differently: Take 1 tablet by mouth every 6 hours as needed for severe pain (7-10) Use as neede , 1-2 pills per week) 30 tablet 0     hydrOXYzine (VISTARIL) 25 MG capsule Take 1 capsule (25 mg) by mouth At Bedtime 90 capsule 3     ibuprofen (ADVIL/MOTRIN) 600 MG tablet Take 1 tablet (600 mg) by mouth every 6 hours as needed for other (mild and/or inflammatory pain) 30 tablet 0     levothyroxine (SYNTHROID/LEVOTHROID) 112 MCG tablet TAKE 1/2 TABLET BY MOUTH DAILY 45 tablet 3     lisinopril (ZESTRIL) 40 MG tablet Take 1 tablet (40 mg) by mouth daily 90 tablet 3     magnesium 250 MG tablet Take 2 tablets by mouth daily       melatonin 5 MG tablet Take 10 mg by mouth At Bedtime        methocarbamol (ROBAXIN) 500 MG tablet Take 1 tablet (500 mg) by mouth 3 times daily (Patient taking differently: Take 500 mg by mouth 3 times daily as needed) 40 tablet 4     metoprolol succinate ER (TOPROL-XL) 50 MG 24 hr tablet Take 1 tablet (50 mg) by mouth daily 90 tablet 3     Multiple Vitamin (MULTI-VITAMIN)  per tablet Take 1 tablet by mouth daily       naloxone (NARCAN) 4 MG/0.1ML nasal spray Spray 1 spray (4 mg) into one nostril alternating nostrils once as needed for opioid reversal every 2-3 minutes until assistance arrives (Patient taking differently: Spray 4 mg into one nostril alternating nostrils once as needed for opioid reversal every 2-3 minutes until assistance arrives  Use only if needed) 0.2 mL 0     ondansetron (ZOFRAN-ODT) 4 MG ODT tab Take 1 tablet (4 mg) by mouth every 12 hours as needed for nausea 180 tablet 3     senna-docusate (SENOKOT-S/PERICOLACE) 8.6-50 MG tablet Take 2 tablets by mouth 2 times daily 30 tablet 0     spironolactone (ALDACTONE) 25 MG tablet Take 2 tablets (50 mg) by mouth daily 180 tablet 3     Vaginal Lubricant (REPHRESH) GEL Place 2 g vaginally every 3 days 2 g 3       Allergies   Allergen Reactions     Erythromycin Nausea     Penicillins Hives     Around age 6 - doesn't recall full reaction, mother told her it was hives.     Has tolerated cephalsporins.        Family History   Problem Relation Age of Onset     Neurologic Disorder Mother         Anuerysm of Cerebral Artery, Dementia     Diabetes Mother      Thyroid Disease Mother         ,     Cerebrovascular Disease Mother      Dementia Mother      Osteoporosis Mother      Heart Disease Father         AAA     Hypertension Father      Circulatory Brother         Perihperal Neurophathy     Diabetes Maternal Grandmother      Asthma Maternal Grandmother      Chronic Obstructive Pulmonary Disease Maternal Grandfather         father     Asthma Maternal Grandfather      Diabetes Maternal Aunt         x2     Melanoma Maternal Aunt      Glaucoma Maternal Aunt      Breast Cancer Cousin      Dementia Other      Cancer Other         malignant melanoma     Hypertension Other      Hypertension Other      Cerebrovascular Disease Other      Cerebrovascular Disease Other      Obesity Other      Respiratory Other      Cancer Other      Diabetes  Other      Asthma Other      Macular Degeneration No family hx of      Coronary Artery Disease No family hx of      Hyperlipidemia No family hx of      Kidney Disease No family hx of      Thrombosis No family hx of      Arthritis No family hx of      Depression No family hx of      Mental Illness No family hx of      Substance Abuse No family hx of      Cystic Fibrosis No family hx of      Early Death No family hx of      Coronary Artery Disease Early Onset No family hx of      Heart Failure No family hx of      Bleeding Diathesis No family hx of      Ovarian Cancer No family hx of      Uterine Cancer No family hx of      Prostate Cancer No family hx of      Colorectal Cancer No family hx of      Pancreatic Cancer No family hx of      Lung Cancer No family hx of      Other Cancer No family hx of      Autoimmune Disease No family hx of      Unknown/Adopted No family hx of      Genetic Disorder No family hx of      Bleeding Disorder No family hx of      Clotting Disorder No family hx of      Anesthesia Reaction No family hx of        Additional medical/Social/Surgical histories reviewed in [facility] and updated as appropriate.        PHYSICAL EXAM  General  - normal appearance, in no obvious distress  Musculoskeletal - left foot  - inspection: swollen midfoot, ecchymosis throughout toes  - palpation: Severely painful with palpation of tarsometatarsal joints 2 through 4  - strength: 5/5 in all planes  Neuro  - no sensory or motor deficit, grossly normal coordination, normal muscle tone             ASSESSMENT & PLAN  Ms. Perez is a 72 year old female who presents to clinic today with left foot pain.    I ordered and independently reviewed x-rays of her foot, these show no obvious acute fracture.    It is likely the fibula fracture that is causing the swelling and pain throughout her foot, this does appear to be intense and severe.  I do recommend that she continue to wear her cam walker, she does have follow-up with   [facility] next week for her distal fibula fracture.    It was a pleasure seeing Nadira today.    Willis Morgan DO, [facility]  Primary Care Sports Medicine      This note was constructed using Dragon dictation software, please excuse any minor errors in spelling, grammar, or syntax.

## 2022-12-07 NOTE — PROGRESS NOTES
CHIEF COMPLAINT:  Pain of the Left Foot       HISTORY OF PRESENT ILLNESS  Ms. Perez is a pleasant 72 year old female who presents to clinic today with left foot pain.  Ismael suffered a fall last week during which she struck her ankle and foot, she was seen last week by Dr. Clark and had an x-ray that revealed a distal fibula fracture.  She has been experiencing intense pain and swelling in her foot, she is wondering if her foot may be injured as well.        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     Corneal opacity     Hypothyroidism     Osteopenia, unspecified location     Aromatase inhibitor use     Chronic pain of right knee     DDD (degenerative disc disease), lumbar     Degenerative scoliosis in adult patient     Peripheral neuropathy     Spinal stenosis of lumbar region with neurogenic claudication     Spondylolisthesis of lumbar region     Brain lesion     Dermatochalasis of both upper eyelids     S/P spinal fusion     Chronic bilateral low back pain     PVD (posterior vitreous detachment), left eye     Vitreous opacities of left eye       Current Outpatient Medications   Medication Sig Dispense Refill     acetaminophen (TYLENOL) 500 MG tablet Take 500-1,000 mg by mouth every 6 hours as needed for mild pain       amLODIPine (NORVASC) 10 MG tablet Take 1 tablet (10 mg) by mouth daily 90 tablet 3     Ascorbic Acid (VITAMIN C) 500 MG CHEW Take 1 tablet  by mouth daily       atorvastatin (LIPITOR) 80 MG tablet Take 1 tablet (80 mg) by mouth daily 90 tablet 3     Cholecalciferol (VITAMIN D3) 50 MCG (2000 UT) CAPS TAKE 3 CAPSULES (6,000 UNITS) BY MOUTH DAILY. 270 capsule 3     CRANBERRY EXTRACT PO Take 650 mg by mouth daily ~10 am  Takes 1       diclofenac (VOLTAREN) 1 % topical gel APPLY 4 GRAMS TO KNEES OR 2 GRAMS TO HANDS FOUR TIMES DAILY USING ENCLOSED DOSING CARD. 100 g 3     gabapentin (NEURONTIN) 300 MG capsule Take 4 capsules (1,200 mg) by mouth 3 times daily 1080 capsule 3     HYDROcodone-acetaminophen (NORCO) 5-325 MG tablet Take 1 tablet by mouth every 6 hours as needed for severe pain (Patient taking differently: Take 1 tablet by mouth every 6 hours as needed for severe pain (7-10) Use as neede , 1-2 pills per week) 30 tablet 0     hydrOXYzine (VISTARIL) 25 MG capsule Take 1 capsule (25 mg) by mouth At Bedtime 90 capsule 3     ibuprofen (ADVIL/MOTRIN) 600 MG tablet Take 1 tablet (600 mg) by mouth every 6 hours as needed for other (mild and/or inflammatory pain) 30 tablet 0     levothyroxine (SYNTHROID/LEVOTHROID) 112 MCG tablet TAKE 1/2 TABLET BY MOUTH DAILY 45 tablet 3     lisinopril (ZESTRIL) 40 MG tablet Take 1 tablet (40 mg) by mouth daily 90 tablet 3     magnesium 250 MG tablet Take 2 tablets by mouth daily       melatonin 5 MG tablet Take 10 mg by mouth At Bedtime        methocarbamol (ROBAXIN) 500 MG tablet Take 1 tablet (500 mg) by mouth 3 times daily (Patient taking differently: Take 500 mg by mouth 3 times daily as needed) 40 tablet 4     metoprolol succinate ER (TOPROL-XL) 50 MG 24 hr tablet Take 1 tablet (50 mg) by mouth daily 90 tablet 3     Multiple Vitamin (MULTI-VITAMIN) per tablet Take 1 tablet by mouth daily       naloxone (NARCAN) 4 MG/0.1ML nasal spray Spray 1 spray (4 mg) into one nostril alternating nostrils once as needed for opioid reversal every 2-3 minutes until assistance arrives (Patient taking differently: Spray 4 mg into one  nostril alternating nostrils once as needed for opioid reversal every 2-3 minutes until assistance arrives  Use only if needed) 0.2 mL 0     ondansetron (ZOFRAN-ODT) 4 MG ODT tab Take 1 tablet (4 mg) by mouth every 12 hours as needed for nausea 180 tablet 3     senna-docusate (SENOKOT-S/PERICOLACE) 8.6-50 MG tablet Take 2 tablets by mouth 2 times daily 30 tablet 0     spironolactone (ALDACTONE) 25 MG tablet Take 2 tablets (50 mg) by mouth daily 180 tablet 3     Vaginal Lubricant (REPHRESH) GEL Place 2 g vaginally every 3 days 2 g 3       Allergies   Allergen Reactions     Erythromycin Nausea     Penicillins Hives     Around age 6 - doesn't recall full reaction, mother told her it was hives.     Has tolerated cephalsporins.        Family History   Problem Relation Age of Onset     Neurologic Disorder Mother         Anuerysm of Cerebral Artery, Dementia     Diabetes Mother      Thyroid Disease Mother         ,     Cerebrovascular Disease Mother      Dementia Mother      Osteoporosis Mother      Heart Disease Father         AAA     Hypertension Father      Circulatory Brother         Perihperal Neurophathy     Diabetes Maternal Grandmother      Asthma Maternal Grandmother      Chronic Obstructive Pulmonary Disease Maternal Grandfather         father     Asthma Maternal Grandfather      Diabetes Maternal Aunt         x2     Melanoma Maternal Aunt      Glaucoma Maternal Aunt      Breast Cancer Cousin      Dementia Other      Cancer Other         malignant melanoma     Hypertension Other      Hypertension Other      Cerebrovascular Disease Other      Cerebrovascular Disease Other      Obesity Other      Respiratory Other      Cancer Other      Diabetes Other      Asthma Other      Macular Degeneration No family hx of      Coronary Artery Disease No family hx of      Hyperlipidemia No family hx of      Kidney Disease No family hx of      Thrombosis No family hx of      Arthritis No family hx of      Depression No family hx  of      Mental Illness No family hx of      Substance Abuse No family hx of      Cystic Fibrosis No family hx of      Early Death No family hx of      Coronary Artery Disease Early Onset No family hx of      Heart Failure No family hx of      Bleeding Diathesis No family hx of      Ovarian Cancer No family hx of      Uterine Cancer No family hx of      Prostate Cancer No family hx of      Colorectal Cancer No family hx of      Pancreatic Cancer No family hx of      Lung Cancer No family hx of      Other Cancer No family hx of      Autoimmune Disease No family hx of      Unknown/Adopted No family hx of      Genetic Disorder No family hx of      Bleeding Disorder No family hx of      Clotting Disorder No family hx of      Anesthesia Reaction No family hx of        Additional medical/Social/Surgical histories reviewed in [facility] and updated as appropriate.        PHYSICAL EXAM  General  - normal appearance, in no obvious distress  Musculoskeletal - left foot  - inspection: swollen midfoot, ecchymosis throughout toes  - palpation: Severely painful with palpation of tarsometatarsal joints 2 through 4  - strength: 5/5 in all planes  Neuro  - no sensory or motor deficit, grossly normal coordination, normal muscle tone             ASSESSMENT & PLAN  Ms. Perez is a 72 year old female who presents to clinic today with left foot pain.    I ordered and independently reviewed x-rays of her foot, these show no obvious acute fracture.    It is likely the fibula fracture that is causing the swelling and pain throughout her foot, this does appear to be intense and severe.  I do recommend that she continue to wear her cam walker, she does have follow-up with Dr. Clark next week for her distal fibula fracture.    It was a pleasure seeing Nadira today.    Willis Morgan DO, [facility]  Primary Care Sports Medicine      This note was constructed using Dragon dictation software, please excuse any minor errors in spelling, grammar, or  syntax.

## 2022-12-13 ASSESSMENT — PAIN SCALES - PAIN ENJOYMENT GENERAL ACTIVITY SCALE (PEG)
PEG_TOTALSCORE: 5.33
INTERFERED_GENERAL_ACTIVITY: 8
AVG_PAIN_PASTWEEK: 4
INTERFERED_ENJOYMENT_LIFE: 4

## 2022-12-14 DIAGNOSIS — S82.65XA CLOSED NONDISPLACED FRACTURE OF LATERAL MALLEOLUS OF LEFT FIBULA, INITIAL ENCOUNTER: Primary | ICD-10-CM

## 2022-12-15 ENCOUNTER — OFFICE VISIT (OUTPATIENT)
Dept: PALLIATIVE MEDICINE | Facility: OTHER | Age: 72
End: 2022-12-15
Payer: COMMERCIAL

## 2022-12-15 DIAGNOSIS — G89.29 CHRONIC BILATERAL LOW BACK PAIN, UNSPECIFIED WHETHER SCIATICA PRESENT: Primary | ICD-10-CM

## 2022-12-15 DIAGNOSIS — M54.59 OTHER LOW BACK PAIN: ICD-10-CM

## 2022-12-15 DIAGNOSIS — I10 BENIGN ESSENTIAL HYPERTENSION: ICD-10-CM

## 2022-12-15 DIAGNOSIS — Z98.1 S/P SPINAL FUSION: ICD-10-CM

## 2022-12-15 DIAGNOSIS — M54.50 CHRONIC BILATERAL LOW BACK PAIN, UNSPECIFIED WHETHER SCIATICA PRESENT: Primary | ICD-10-CM

## 2022-12-15 PROCEDURE — 97811 ACUP 1/> W/O ESTIM EA ADD 15: CPT | Performed by: ACUPUNCTURIST

## 2022-12-15 PROCEDURE — 97810 ACUP 1/> WO ESTIM 1ST 15 MIN: CPT | Performed by: ACUPUNCTURIST

## 2022-12-15 RX ORDER — METOPROLOL SUCCINATE 50 MG/1
50 TABLET, EXTENDED RELEASE ORAL DAILY
Qty: 90 TABLET | Refills: 3 | Status: SHIPPED | OUTPATIENT
Start: 2022-12-15 | End: 2023-09-06

## 2022-12-15 NOTE — TELEPHONE ENCOUNTER
METOPROLOL SUCC ER 50 MG TAB  Passed protocol    Last office visit:  10/27/2022  [facility] Internal Medicine [place]  Matilde Quiñonez, APRN BLUE  Internal Medicine

## 2022-12-15 NOTE — PROGRESS NOTES
ACUPUNCTURIST TREATMENT NOTE      Nadira Perez, a 72 year old female, is here today for Follow - Up exam. Patient is referred by Ariannachristal. Treatment 9.    HPI  Main Complaint: Chronic low back pain: States that over all pain seems better.       Secondary Complaints: Neuropathy: Has acute fibular fracture in L side.     Past Medical History  Past Medical History Reviewed: Yes   has a past medical history of Arthritis, Bone disease, Breast cancer (H), H/O kyphoplasty, Hearing problem, History of kidney stones, History of radiation therapy, Hyperlipemia, Hypertension, Hypopotassemia, Kidney problem, Lymph edema, Medullary sponge kidney, Osteopenia, PONV (postoperative nausea and vomiting), Reduced vision, Squamous cell skin cancer, and Thyroid disease.    Objective        Patient Questionnaire Submission  --------------------------------     Questionnaire: PEG: A Three-Item Scale Assessing Pain Intensity and Interference     Question: What number best describes your PAIN ON AVERAGE in the past week?  Answer:   4     Question: What number best describes how, during the past week, pain has interfered with your ENJOYMENT OF LIFE?  Answer:   4     Question: What number best describes how, during the past week, pain has interfered with your GENERAL ACTIVITY?  Answer:   8         Basic Exam Completed:   No    TCM Exam Completed: Yes   Sleep: No concerns  Limbs/Back: Left Lower Extremity and Lower Back    Tongue/Pulse Exam Completed: No    Patient Assessment  Patient Type: Pain  Patient Complaint: Chronic low back pain; neuropathy in feet    Acupuncture 12/5/2022 12/15/2022   Intervention Reason Pain Pain   Pain Location low back low back   Pre-session Pain Rating 4 5   Post-session Pain Rating - 4       TCM Diagnosis: Qi Stagnation;Blood Stagnation;Kidney Qi Deficiency      Treatment Principle: channel obstruction of Sreekanth Colon and Billy Colon; heart fire    TCM / Acupuncture Treatment  Acupuncture Points:       Initial  insertions: Sixto, HTJJ L4-L5, Maame, Ub25-26, Yaoyan       Second insertions: Ub27-28, Ub31-32, Gb34, Ub57. Right: Ub62, Ub60. Left: Ub53, Ub54                    Accessory Techniques 12/5/2022 12/15/2022   Accessory Techniques TDP Heat Lamp TDP Heat Lamp; Tuina; Topicals   TDP Heat Lamp location used low back low back   Tuina location used - low back   Topicals - Po Sum On   Topicals location used - low back     Oswestry Disability Index (JOHN   Nish Weeks 1980, All rights reserved) 8/8/2022 11/3/2022   Section 1 - Pain intensity The pain is moderate at the moment. The pain is moderate at the moment.   Section 2 - Personal care (washing, dressing, etc.)  I need some help but manage most of my personal care. I can look after myself normally but it is very painful.   Section 3 - Lifting I can only lift very light weights. I can only lift very light weights.   Section 4 - Walking I can only walk using a cane or crutches. Pain prevents me from walking more than a quarter of a mile.   Section 5 - Sitting Pain prevents me from sitting for more than half an hour. Pain prevents me from sitting for more than 1 hour.   Section 6 - Standing Pain prevents me from standing for more than 10 minutes. Pain prevents me from standing for more than half an hour.   Section 7 - Sleeping Because of pain I have less than 4 hours sleep. Because of pain I have less than 6 hours sleep.   Section 8 - Sex life (if applicable) Not answered/NA Not answered/NA   Section 9 - Social life Pain has restricted my social life and I do not go out as often. Pain has restricted my social life and I do not go out as often.   Section 10 - Traveling I can travel anywhere without pain. I can travel anywhere without pain.   Oswestry Score (%) 57.78 42.22       Assessment and Plan  Treatment Observations: Patient relaxed well during treatment  Acupuncture Treatment Recommendations: Diagnoses for treatment: Z98.1 (ICD-10-CM) - S/P spinal fusion   M54.50, G89.29 (ICD-10-CM) - Chronic low back pain, Other low back pain.       It is my recommendation that this patient seek advice from their Primary Care Provider about active symptoms not addressed during this visit. The risks and benefits of acupuncture were reviewed and the patient stated understanding. The patient's questions were answered to their satisfaction. Consent was provided for treatment. We thank you for the referral and opportunity to treat this patient.    Time Spent with Patient:   I spent a total of 30 minutes face-to-face with Nadira Perez during today's office visit.     Zara Rosa L.Ac.  12/15/22

## 2022-12-16 ENCOUNTER — ANCILLARY PROCEDURE (OUTPATIENT)
Dept: GENERAL RADIOLOGY | Facility: CLINIC | Age: 72
End: 2022-12-16
Attending: FAMILY MEDICINE
Payer: COMMERCIAL

## 2022-12-16 ENCOUNTER — OFFICE VISIT (OUTPATIENT)
Dept: ORTHOPEDICS | Facility: CLINIC | Age: 72
End: 2022-12-16
Payer: COMMERCIAL

## 2022-12-16 DIAGNOSIS — S82.65XD CLOSED NONDISPLACED FRACTURE OF LATERAL MALLEOLUS OF LEFT FIBULA WITH ROUTINE HEALING, SUBSEQUENT ENCOUNTER: Primary | ICD-10-CM

## 2022-12-16 DIAGNOSIS — S82.65XA CLOSED NONDISPLACED FRACTURE OF LATERAL MALLEOLUS OF LEFT FIBULA, INITIAL ENCOUNTER: ICD-10-CM

## 2022-12-16 PROCEDURE — 99213 OFFICE O/P EST LOW 20 MIN: CPT | Performed by: FAMILY MEDICINE

## 2022-12-16 PROCEDURE — 73610 X-RAY EXAM OF ANKLE: CPT | Mod: LT | Performed by: RADIOLOGY

## 2022-12-16 NOTE — PATIENT INSTRUCTIONS
CAM boot to be worn  Ok not to sleep in boot, if getting up in the middle of the night- please put on boot    Use walker, low light in bathroom    Ice, elevation    Re-x-ray next visit in 1 month

## 2022-12-16 NOTE — LETTER
12/16/2022      RE: Nadira Perez  [address]  [facility] [id number]     Dear Colleague,    Thank you for referring your patient, Nadira Perez, to the [facility] [place]. Please see a copy of my visit note below.     Subjective:   Nadira Perez is a 72 year old female who returns for left ankle fracture.  Patient reports that she has been wearing the boot but taking it off at night and usually not putting it back on prior to going to the bathroom.  Patient reports that she has been very cautious.  She typically walks with a walker.    Date of injury: 12/1/22  Date last seen: 12/14/2022  Following Therapeutic Plan: Yes CAM boot   Pain: Improving  Function: Improving  Interval History: Today, the patient reports that she would like clarification on her restrictions. She does wear the boot, but if she's resting at home she does not wear the boot. She has been icing and elevating.     PAST MEDICAL, SOCIAL, SURGICAL AND FAMILY HISTORY: She  has a past medical history of Arthritis, Bone disease, Breast cancer (H), H/O kyphoplasty, Hearing problem, History of kidney stones, History of radiation therapy, Hyperlipemia, Hypertension, Hypopotassemia, Kidney problem, Lymph edema, Medullary sponge kidney, Osteopenia, PONV (postoperative nausea and vomiting), Reduced vision, Squamous cell skin cancer, and Thyroid disease.    She has no past medical history of Basal cell carcinoma, Diabetes (H), Malignant melanoma (H), or Squamous cell carcinoma.  She  has a past surgical history that includes Parathyroidectomy (09/23/2004); Rhinoplasty (1968); thyr proc skin closed cosmetic manner by subcuticular stitch (01/23/2009); Mastectomy modified radical (Bilateral); hernia repair; Wrist surgery; Tonsillectomy (1977); Hysterectomy total abdominal (05/03/2000); Parathyroidectomy (09/23/2004); Arthrodesis wrist (Right); External ear surgery; Arthrodesis wrist (02/14/2013); Graft  bone from iliac crest (02/14/2013); Remove hardware hand (09/24/2013); Colonoscopy (12/24/2013); Abdomen surgery; Eye surgery; biopsy; BREATH HYDROGEN TEST (N/A, 10/14/2016); Esophagoscopy, gastroscopy, duodenoscopy (EGD), combined (N/A, 11/23/2016); cataract iol, rt/lt (Right, 03/13/2018); cataract iol, rt/lt (Left, 02/20/2018); Thyroplasty (10/09/2009); Fusion, Spine, Interbody, Oblique Anterior And Lumbar, 2 Levels, Post Approach, Using Ots (N/A, 11/18/2021); Optical tracking system fusion spine posterior lumbar one level (N/A, 11/18/2021); Release carpal tunnel (Right, 12/2/2021); Blepharoplasty bilateral (Bilateral, 5/6/2022); Cosmetic blepharoplasty lower lids bilateral (Bilateral, 5/6/2022); and Vitrectomy parsplana with 25 gauge system (Left, 6/20/2022).  Her family history includes Asthma in her maternal grandfather, maternal grandmother, and another family member; Breast Cancer in her cousin; Cancer in some other family members; Cerebrovascular Disease in her mother and other family members; Chronic Obstructive Pulmonary Disease in her maternal grandfather; Circulatory in her brother; Dementia in her mother and another family member; Diabetes in her maternal aunt, maternal grandmother, mother, and another family member; Glaucoma in her maternal aunt; Heart Disease in her father; Hypertension in her father and other family members; Melanoma in her maternal aunt; Neurologic Disorder in her mother; Obesity in an other family member; Osteoporosis in her mother; Respiratory in an other family member; Thyroid Disease in her mother.  She reports that she has never smoked. She has never used smokeless tobacco. She reports that she does not currently use alcohol after a past usage of about 7.0 standard drinks per week. She reports current drug use. Drug: Marijuana.    ALLERGIES: She is allergic to erythromycin and penicillins.    CURRENT MEDICATIONS: She has a current medication list which includes the following  prescription(s): acetaminophen, amlodipine, vitamin c, atorvastatin, vitamin d3, cranberry, diclofenac, gabapentin, hydrocodone-acetaminophen, hydroxyzine, ibuprofen, levothyroxine, lisinopril, magnesium, melatonin, methocarbamol, metoprolol succinate er, multi-vitamin, naloxone, ondansetron, senna-docusate, spironolactone, and rephresh, and the following Facility-Administered Medications: dexamethasone, lidocaine, lidocaine, lidocaine, lidocaine, lidocaine, romosozumab-aqqg, romosozumab-aqqg, romosozumab-aqqg, romosozumab-aqqg, romosozumab-aqqg, romosozumab-aqqg, romosozumab-aqqg, romosozumab-aqqg, triamcinolone, triamcinolone, triamcinolone, and triamcinolone.     REVIEW OF SYSTEMS: 10 point review of systems is negative except as noted above.     Exam:   There were no vitals taken for this visit.           CONSTITUTIONAL: alert, mild distress, cooperative and over weight  HEAD: Normocephalic. No masses, lesions, tenderness or abnormalities  SKIN: no suspicious lesions or rashes  GAIT: antalgic and wheelchair  NEUROLOGIC: positive findings: neuropathy in feet at baseline  PSYCHIATRIC: affect normal/bright and mentation appears normal.    MUSCULOSKELETAL: left ankle pain    Knee:not done  Lower leg:non-tender, decreased sensation due to neuropathy    ANKLE  Inspection:Swelling:diffuse   Tender:distal fibula  Non-tender:ATFL, CFL, PTFL, achilles tendon  Range of Motion:dorsiflexion:  decreased, plantarflexion:  decreased  Strength:dorsiflexion:  4+/5, plantarflexion:  4+/5, inversion: 5-/5, eversion:5-/5  Special tests:negative anterior drawer  Stance: none    FOOT  foot exam : Inspection Palpation:   Swelling: dorsum swelling  Tender::bruising into toes, dorsal swelling into lower ext  Non-tender:proximal 5th metatarsal, navicular, metatarsal heads  Range of Motion:flexion of toes:  full, extension of toes  full           Assessment/Plan:   Patient is a 72-year-old female with past medical history of osteoporosis  and here to follow-up on a fracture in her left fibula  1.  Subsequent encounter left fibula fracture-  Patient reports that she will be diligent with her cam boot for protection and monitor for fall risks  Patient has been using a walker  She does not have to wear the cam boot at night to sleep and but would recommend that she have her foot in the cam boot if she walks  Icing and elevation  Due to the lower extremity swelling patient can try to change her sleep position as I think laying on the left side would currently be difficult    Return to clinic in 1 month with repeat x-rays of the left ankle 3 view  X-RAY INTERPRETATION:   X-Ray of the Left Ankle: 3-view, ap, lateral, mortise   ordered and interpreted in the office today was positive for oblique minimally displaced fracture through the distal left fibula      Again, thank you for allowing me to participate in the care of your patient.      Sincerely,    Cortney Clark MD

## 2022-12-16 NOTE — PROGRESS NOTES
Subjective:   Nadira Perez is a 72 year old female who returns for left ankle fracture.  Patient reports that she has been wearing the boot but taking it off at night and usually not putting it back on prior to going to the bathroom.  Patient reports that she has been very cautious.  She typically walks with a walker.    Date of injury: 12/1/22  Date last seen: 12/14/2022  Following Therapeutic Plan: Yes CAM boot   Pain: Improving  Function: Improving  Interval History: Today, the patient reports that she would like clarification on her restrictions. She does wear the boot, but if she's resting at home she does not wear the boot. She has been icing and elevating.     PAST MEDICAL, SOCIAL, SURGICAL AND FAMILY HISTORY: She  has a past medical history of Arthritis, Bone disease, Breast cancer (H), H/O kyphoplasty, Hearing problem, History of kidney stones, History of radiation therapy, Hyperlipemia, Hypertension, Hypopotassemia, Kidney problem, Lymph edema, Medullary sponge kidney, Osteopenia, PONV (postoperative nausea and vomiting), Reduced vision, Squamous cell skin cancer, and Thyroid disease.    She has no past medical history of Basal cell carcinoma, Diabetes (H), Malignant melanoma (H), or Squamous cell carcinoma.  She  has a past surgical history that includes Parathyroidectomy (09/23/2004); Rhinoplasty (1968); thyr proc skin closed cosmetic manner by subcuticular stitch (01/23/2009); Mastectomy modified radical (Bilateral); hernia repair; Wrist surgery; Tonsillectomy (1977); Hysterectomy total abdominal (05/03/2000); Parathyroidectomy (09/23/2004); Arthrodesis wrist (Right); External ear surgery; Arthrodesis wrist (02/14/2013); Graft bone from iliac crest (02/14/2013); Remove hardware hand (09/24/2013); Colonoscopy (12/24/2013); Abdomen surgery; Eye surgery; biopsy; BREATH HYDROGEN TEST (N/A, 10/14/2016); Esophagoscopy, gastroscopy, duodenoscopy (EGD), combined (N/A, 11/23/2016); cataract iol, rt/lt  (Right, 03/13/2018); cataract iol, rt/lt (Left, 02/20/2018); Thyroplasty (10/09/2009); Fusion, Spine, Interbody, Oblique Anterior And Lumbar, 2 Levels, Post Approach, Using Ots (N/A, 11/18/2021); Optical tracking system fusion spine posterior lumbar one level (N/A, 11/18/2021); Release carpal tunnel (Right, 12/2/2021); Blepharoplasty bilateral (Bilateral, 5/6/2022); Cosmetic blepharoplasty lower lids bilateral (Bilateral, 5/6/2022); and Vitrectomy parsplana with 25 gauge system (Left, 6/20/2022).  Her family history includes Asthma in her maternal grandfather, maternal grandmother, and another family member; Breast Cancer in her cousin; Cancer in some other family members; Cerebrovascular Disease in her mother and other family members; Chronic Obstructive Pulmonary Disease in her maternal grandfather; Circulatory in her brother; Dementia in her mother and another family member; Diabetes in her maternal aunt, maternal grandmother, mother, and another family member; Glaucoma in her maternal aunt; Heart Disease in her father; Hypertension in her father and other family members; Melanoma in her maternal aunt; Neurologic Disorder in her mother; Obesity in an other family member; Osteoporosis in her mother; Respiratory in an other family member; Thyroid Disease in her mother.  She reports that she has never smoked. She has never used smokeless tobacco. She reports that she does not currently use alcohol after a past usage of about 7.0 standard drinks per week. She reports current drug use. Drug: Marijuana.    ALLERGIES: She is allergic to erythromycin and penicillins.    CURRENT MEDICATIONS: She has a current medication list which includes the following prescription(s): acetaminophen, amlodipine, vitamin c, atorvastatin, vitamin d3, cranberry, diclofenac, gabapentin, hydrocodone-acetaminophen, hydroxyzine, ibuprofen, levothyroxine, lisinopril, magnesium, melatonin, methocarbamol, metoprolol succinate er, multi-vitamin,  naloxone, ondansetron, senna-docusate, spironolactone, and rephresh, and the following Facility-Administered Medications: dexamethasone, lidocaine, lidocaine, lidocaine, lidocaine, lidocaine, romosozumab-aqqg, romosozumab-aqqg, romosozumab-aqqg, romosozumab-aqqg, romosozumab-aqqg, romosozumab-aqqg, romosozumab-aqqg, romosozumab-aqqg, triamcinolone, triamcinolone, triamcinolone, and triamcinolone.     REVIEW OF SYSTEMS: 10 point review of systems is negative except as noted above.     Exam:   There were no vitals taken for this visit.           CONSTITUTIONAL: alert, mild distress, cooperative and over weight  HEAD: Normocephalic. No masses, lesions, tenderness or abnormalities  SKIN: no suspicious lesions or rashes  GAIT: antalgic and wheelchair  NEUROLOGIC: positive findings: neuropathy in feet at baseline  PSYCHIATRIC: affect normal/bright and mentation appears normal.    MUSCULOSKELETAL: left ankle pain    Knee:not done  Lower leg:non-tender, decreased sensation due to neuropathy    ANKLE  Inspection:Swelling:diffuse   Tender:distal fibula  Non-tender:ATFL, CFL, PTFL, achilles tendon  Range of Motion:dorsiflexion:  decreased, plantarflexion:  decreased  Strength:dorsiflexion:  4+/5, plantarflexion:  4+/5, inversion: 5-/5, eversion:5-/5  Special tests:negative anterior drawer  Stance: none    FOOT  foot exam : Inspection Palpation:   Swelling: dorsum swelling  Tender::bruising into toes, dorsal swelling into lower ext  Non-tender:proximal 5th metatarsal, navicular, metatarsal heads  Range of Motion:flexion of toes:  full, extension of toes  full           Assessment/Plan:   Patient is a 72-year-old female with past medical history of osteoporosis and here to follow-up on a fracture in her left fibula  1.  Subsequent encounter left fibula fracture-  Patient reports that she will be diligent with her cam boot for protection and monitor for fall risks  Patient has been using a walker  She does not have to wear the  cam boot at night to sleep and but would recommend that she have her foot in the cam boot if she walks  Icing and elevation  Due to the lower extremity swelling patient can try to change her sleep position as I think laying on the left side would currently be difficult    Return to clinic in 1 month with repeat x-rays of the left ankle 3 view  X-RAY INTERPRETATION:   X-Ray of the Left Ankle: 3-view, ap, lateral, mortise   ordered and interpreted in the office today was positive for oblique minimally displaced fracture through the distal left fibula

## 2022-12-20 ASSESSMENT — PAIN SCALES - PAIN ENJOYMENT GENERAL ACTIVITY SCALE (PEG)
PEG_TOTALSCORE: 6.33
INTERFERED_GENERAL_ACTIVITY: 8
AVG_PAIN_PASTWEEK: 4
AVG_PAIN_PASTWEEK: 4
PEG_TOTALSCORE: 6.33
INTERFERED_ENJOYMENT_LIFE: 7
INTERFERED_GENERAL_ACTIVITY: 8
INTERFERED_ENJOYMENT_LIFE: 7

## 2022-12-21 ENCOUNTER — OFFICE VISIT (OUTPATIENT)
Dept: PALLIATIVE MEDICINE | Facility: OTHER | Age: 72
End: 2022-12-21
Payer: COMMERCIAL

## 2022-12-21 DIAGNOSIS — Z98.1 S/P SPINAL FUSION: Primary | ICD-10-CM

## 2022-12-21 DIAGNOSIS — G89.29 CHRONIC BILATERAL LOW BACK PAIN, UNSPECIFIED WHETHER SCIATICA PRESENT: ICD-10-CM

## 2022-12-21 DIAGNOSIS — M54.59 OTHER LOW BACK PAIN: ICD-10-CM

## 2022-12-21 DIAGNOSIS — M54.50 CHRONIC BILATERAL LOW BACK PAIN, UNSPECIFIED WHETHER SCIATICA PRESENT: ICD-10-CM

## 2022-12-21 PROCEDURE — 97811 ACUP 1/> W/O ESTIM EA ADD 15: CPT | Performed by: ACUPUNCTURIST

## 2022-12-21 PROCEDURE — 97810 ACUP 1/> WO ESTIM 1ST 15 MIN: CPT | Performed by: ACUPUNCTURIST

## 2022-12-21 NOTE — PROGRESS NOTES
ACUPUNCTURIST TREATMENT NOTE      Nadira Perez, a 72 year old female, is here today for Follow - Up exam. Patient is referred by Ariannaiwe.    HPI  Main Complaint: Chronic pain of low back.  Secondary Complaints: Pain of left lower extremity from fibula fracture on 12/1/22. (L) wrist pain    Past Medical History  Past Medical History Reviewed: No   has a past medical history of Arthritis, Bone disease, Breast cancer (H), H/O kyphoplasty, Hearing problem, History of kidney stones, History of radiation therapy, Hyperlipemia, Hypertension, Hypopotassemia, Kidney problem, Lymph edema, Medullary sponge kidney, Osteopenia, PONV (postoperative nausea and vomiting), Reduced vision, Squamous cell skin cancer, and Thyroid disease.    Objective  Basic Exam Completed:       TCM Exam Completed: Yes   Limbs/Back: Lower Back    Tongue/Pulse Exam Completed: No    Patient Assessment  Patient Type: Pain  Patient Complaint: Chronic pain of low back, pain from (L) fibula fracture, (L) wrist pain    Acupuncture 12/15/2022 12/21/2022   Intervention Reason Pain Pain; Pain 2; Pain 3   Pain Location low back low back   Pre-session Pain Rating 5 3   Post-session Pain Rating 4 3   Pain 2 Location - LLE   Pre-session Pain 2 Rating - 2   Post-session Pain 2 Rating - 1   Pain 3 Location - (L) wrist   Pre-session Pain 3 Rating - 4   Post-session Pain 3 Rating - 0       TCM Diagnosis: Qi Stagnation;Blood Stagnation;Kidney Qi Deficiency      Treatment Principle: channel obstruction of Sreekanth Colon and Billy Colon; heart fire    TCM / Acupuncture Treatment  Acupuncture Points:       Initial insertions: (L) P 7, (L) Li 5, (L) Tb 4, (L) Si 5, Bl 60, (R) Bl 62, 65, (L) St 36, 40, (L) Gb 34, 36, 39       Second insertions: Gb 30, Bl 31, 32, Mariann Meyer @ L5            Number of needles inserted: 23  Number of needles removed: 23    Accessory Techniques 12/15/2022 12/21/2022   Accessory Techniques TDP Heat Lamp; Tuina; Topicals TDP Heat Lamp   TDP  Heat Lamp location used low back low back   Tuina location used low back -   Topicals Po Sum On -   Topicals location used low back -     Oswestry Disability Index (JOHN   Nish Langbank 1980, All rights reserved) 8/8/2022 11/3/2022   Section 1 - Pain intensity The pain is moderate at the moment. The pain is moderate at the moment.   Section 2 - Personal care (washing, dressing, etc.)  I need some help but manage most of my personal care. I can look after myself normally but it is very painful.   Section 3 - Lifting I can only lift very light weights. I can only lift very light weights.   Section 4 - Walking I can only walk using a cane or crutches. Pain prevents me from walking more than a quarter of a mile.   Section 5 - Sitting Pain prevents me from sitting for more than half an hour. Pain prevents me from sitting for more than 1 hour.   Section 6 - Standing Pain prevents me from standing for more than 10 minutes. Pain prevents me from standing for more than half an hour.   Section 7 - Sleeping Because of pain I have less than 4 hours sleep. Because of pain I have less than 6 hours sleep.   Section 8 - Sex life (if applicable) Not answered/NA Not answered/NA   Section 9 - Social life Pain has restricted my social life and I do not go out as often. Pain has restricted my social life and I do not go out as often.   Section 10 - Traveling I can travel anywhere without pain. I can travel anywhere without pain.   Oswestry Score (%) 57.78 42.22       Assessment and Plan  Treatment Observations:    Acupuncture Treatment Recommendations:         It is my recommendation that this patient seek advice from their Primary Care Provider about active symptoms not addressed during this visit. The risks and benefits of acupuncture were reviewed and the patient stated understanding. The patient's questions were answered to their satisfaction. Consent was provided for treatment. We thank you for the referral and opportunity to  treat this patient.    Time Spent with Patient:   I spent a total of 30 minutes face-to-face with Nadira Perez during today's office visit.     Ponce Trejo

## 2022-12-27 ENCOUNTER — OFFICE VISIT (OUTPATIENT)
Dept: PALLIATIVE MEDICINE | Facility: OTHER | Age: 72
End: 2022-12-27
Payer: COMMERCIAL

## 2022-12-27 DIAGNOSIS — G89.29 CHRONIC BILATERAL LOW BACK PAIN, UNSPECIFIED WHETHER SCIATICA PRESENT: ICD-10-CM

## 2022-12-27 DIAGNOSIS — M54.50 CHRONIC BILATERAL LOW BACK PAIN, UNSPECIFIED WHETHER SCIATICA PRESENT: ICD-10-CM

## 2022-12-27 DIAGNOSIS — M54.59 OTHER LOW BACK PAIN: ICD-10-CM

## 2022-12-27 DIAGNOSIS — Z98.1 S/P SPINAL FUSION: Primary | ICD-10-CM

## 2022-12-27 PROCEDURE — 97811 ACUP 1/> W/O ESTIM EA ADD 15: CPT | Performed by: ACUPUNCTURIST

## 2022-12-27 PROCEDURE — 97810 ACUP 1/> WO ESTIM 1ST 15 MIN: CPT | Performed by: ACUPUNCTURIST

## 2022-12-27 NOTE — PROGRESS NOTES
ACUPUNCTURIST TREATMENT NOTE      Nadira Perez, a 72 year old female, is here today for Follow - Up exam. Patient is referred by Anyiwe.    HPI  Main Complaint: Chronic low back pain: Feels acupuncture has been helping, however, now that she is walking with the boot due to her fracture, she feels that it is aggravating her LBP more. Pain is not waking her at night. Pain increases with more walking.    Secondary Complaints: LLE pain due to fibula fracture 12/1/22: Pain is improving, swelling is down.    Past Medical History  Past Medical History Reviewed: Yes   has a past medical history of Arthritis, Bone disease, Breast cancer (H), H/O kyphoplasty, Hearing problem, History of kidney stones, History of radiation therapy, Hyperlipemia, Hypertension, Hypopotassemia, Kidney problem, Lymph edema, Medullary sponge kidney, Osteopenia, PONV (postoperative nausea and vomiting), Reduced vision, Squamous cell skin cancer, and Thyroid disease.    Objective    PEG: A Three-Item Scale Assessing Pain Intensity and Interference    What number best describes your PAIN ON AVERAGE in the past week? 4    What number best describes how, during the past week, pain has interfered with your ENJOYMENT OF LIFE? 7    What number best describes how, during the past week, pain has interfered with your GENERAL ACTIVITY? 8    PEG Total Score: 6.33    Marin EE, Rocky KA, Maxwell MJ, Eden TA, Jarvis J, Ken JM, Pooja SM, Jewel K. Development and initial validation of the PEG, a 3-item scale assessing pain intensity and interference. Journal of General Internal Medicine. 2009 Charlie;24:733-738.  Basic Exam Completed:   No    TCM Exam Completed: Yes   Energy: Medium  Sleep: No concerns  Limbs/Back: Left Lower Extremity and Lower Back    Tongue/Pulse Exam Completed: No    Patient Assessment  Patient Type: Pain  Patient Complaint: Chronic low back pain; pain in LLE    Acupuncture 12/21/2022 12/27/2022   Intervention Reason Pain; Pain 2; Pain 3  Pain; Pain 2   Pain Location low back low back   Pre-session Pain Rating 3 6   Post-session Pain Rating 3 1   Pain 2 Location LLE LLE   Pre-session Pain 2 Rating 2 2   Post-session Pain 2 Rating 1 -   Pain 3 Location (L) wrist -   Pre-session Pain 3 Rating 4 -   Post-session Pain 3 Rating 0 -       TCM Diagnosis: Qi Stagnation;Blood Stagnation;Kidney Qi Deficiency      Treatment Principle: channel obstruction of Sreekanth Colon and Billy Colon; heart fire    TCM / Acupuncture Treatment  Acupuncture Points:       Initial insertions: Baihui, HTJJ L4-L5, Shiqizhuixia, Ub25-26, Yaoyan       Second insertions: Ub27-28, Ub31-32, Ub53, Gb34, Ub57. Right: Ub62, Ub60. Left: Ub54, Li4, Li5, Si6, Tb5                    Accessory Techniques 12/21/2022 12/27/2022   Accessory Techniques TDP Heat Lamp TDP Heat Lamp   TDP Heat Lamp location used low back low back   Tuina location used - -   Topicals - -   Topicals location used - -     Oswestry Disability Index (JOHN   Nish Weeks 1980, All rights reserved) 8/8/2022 11/3/2022   Section 1 - Pain intensity The pain is moderate at the moment. The pain is moderate at the moment.   Section 2 - Personal care (washing, dressing, etc.)  I need some help but manage most of my personal care. I can look after myself normally but it is very painful.   Section 3 - Lifting I can only lift very light weights. I can only lift very light weights.   Section 4 - Walking I can only walk using a cane or crutches. Pain prevents me from walking more than a quarter of a mile.   Section 5 - Sitting Pain prevents me from sitting for more than half an hour. Pain prevents me from sitting for more than 1 hour.   Section 6 - Standing Pain prevents me from standing for more than 10 minutes. Pain prevents me from standing for more than half an hour.   Section 7 - Sleeping Because of pain I have less than 4 hours sleep. Because of pain I have less than 6 hours sleep.   Section 8 - Sex life (if applicable) Not  answered/NA Not answered/NA   Section 9 - Social life Pain has restricted my social life and I do not go out as often. Pain has restricted my social life and I do not go out as often.   Section 10 - Traveling I can travel anywhere without pain. I can travel anywhere without pain.   Oswestry Score (%) 57.78 42.22       Assessment and Plan  Treatment Observations: Patient relaxed well during treatment  Acupuncture Treatment Recommendations: Diagnoses for treatment: Z98.1 (ICD-10-CM) - S/P spinal fusion  M54.50, G89.29 (ICD-10-CM) - Chronic low back pain, Other low back pain.       It is my recommendation that this patient seek advice from their Primary Care Provider about active symptoms not addressed during this visit. The risks and benefits of acupuncture were reviewed and the patient stated understanding. The patient's questions were answered to their satisfaction. Consent was provided for treatment. We thank you for the referral and opportunity to treat this patient.    Time Spent with Patient:   I spent a total of 30 minutes face-to-face with Nadira Perez during today's office visit.     Zara Rosa L.Ac.  12/27/22

## 2023-01-04 ENCOUNTER — OFFICE VISIT (OUTPATIENT)
Dept: ORTHOPEDICS | Facility: CLINIC | Age: 73
End: 2023-01-04
Payer: COMMERCIAL

## 2023-01-04 VITALS — WEIGHT: 184 LBS | HEIGHT: 67 IN | BODY MASS INDEX: 28.88 KG/M2

## 2023-01-04 DIAGNOSIS — M80.08XG PATHOLOGICAL FRACTURE OF VERTEBRA DUE TO AGE-RELATED OSTEOPOROSIS WITH DELAYED HEALING, SUBSEQUENT ENCOUNTER: Primary | ICD-10-CM

## 2023-01-04 DIAGNOSIS — M81.0 OSTEOPOROSIS, UNSPECIFIED OSTEOPOROSIS TYPE, UNSPECIFIED PATHOLOGICAL FRACTURE PRESENCE: ICD-10-CM

## 2023-01-04 PROCEDURE — 99207 PR DROP WITH A PROCEDURE: CPT | Performed by: FAMILY MEDICINE

## 2023-01-04 PROCEDURE — 96372 THER/PROPH/DIAG INJ SC/IM: CPT | Performed by: FAMILY MEDICINE

## 2023-01-04 NOTE — PROGRESS NOTES
"  Assessment & Plan     Pathological fracture of vertebra due to age-related osteoporosis with delayed healing, subsequent encounter  Evenity #9  - romosozumab-aqqg (EVENITY) injection 210 mg    Osteoporosis, unspecified osteoporosis type, unspecified pathological fracture presence    - romosozumab-aqqg (EVENITY) injection 210 mg    Prescription drug management         BMI:   Estimated body mass index is 28.82 kg/m  as calculated from the following:    Height as of this encounter: 1.702 m (5' 7\").    Weight as of this encounter: 83.5 kg (184 lb).   Weight management plan: Discussed healthy diet and exercise guidelines    See Patient Instructions    Return in about 4 weeks (around 2/1/2023), or if symptoms worsen or fail to improve.    Cortney Clark MD  [facility] [place]    Tom Guevara is a 72 year old accompanied by her self, presenting for the following health issues:  RECHECK (Evenity 9/12)  Evenity #9    HPI     Pt is a 71 yo white female here for Evenity injections.  Weather poor outside and difficulties getting here.  Pt was a week off and determined to make injections appt today.  Pt has follow-up for her foot in a week or so.  Better in the CAM boot and less painful.    Review of Systems   Constitutional, HEENT, cardiovascular, pulmonary, gi and gu systems are negative, except as otherwise noted.      Objective    Ht 1.702 m (5' 7\")   Wt 83.5 kg (184 lb)   BMI 28.82 kg/m    Body mass index is 28.82 kg/m .  Physical Exam   NAD   nonlabored breathing                Procedure: risks and benefits discussed and patient was consented.  Chloroprep used to clean the area of the skin and ethyl chloride to anesthetize the area.  1 injected each lateral thigh.  Band aids in place.  Well tolerated.    "

## 2023-01-05 ENCOUNTER — HOSPITAL ENCOUNTER (OUTPATIENT)
Dept: PHYSICAL THERAPY | Facility: CLINIC | Age: 73
Discharge: HOME | End: 2023-01-05
Payer: COMMERCIAL

## 2023-01-05 DIAGNOSIS — R26.89 IMPAIRMENT OF BALANCE: ICD-10-CM

## 2023-01-05 DIAGNOSIS — I89.0 LYMPHEDEMA OF LEFT UPPER EXTREMITY: Primary | ICD-10-CM

## 2023-01-05 DIAGNOSIS — R53.81 PHYSICAL DECONDITIONING: ICD-10-CM

## 2023-01-05 DIAGNOSIS — L90.5 SCAR CONDITION AND FIBROSIS OF SKIN: ICD-10-CM

## 2023-01-05 DIAGNOSIS — Z91.81 H/O FALL: ICD-10-CM

## 2023-01-05 DIAGNOSIS — R53.1 DECREASED STRENGTH: ICD-10-CM

## 2023-01-05 PROCEDURE — 97140 MANUAL THERAPY 1/> REGIONS: CPT | Mod: GP | Performed by: PHYSICAL THERAPIST

## 2023-01-05 PROCEDURE — 97162 PT EVAL MOD COMPLEX 30 MIN: CPT | Mod: GP | Performed by: PHYSICAL THERAPIST

## 2023-01-05 NOTE — PROGRESS NOTES
[facility]        OUTPATIENT PHYSICAL THERAPY EDEMA EVALUATION  PLAN OF TREATMENT FOR OUTPATIENT REHABILITATION  (COMPLETE FOR INITIAL CLAIMS ONLY)  Patient's Last Name, First Name, Nadira Meyers  SHANNAN                           Provider s Name:   [facility] Medical Record No.  [id number]     Start of Care Date:  01/05/23   Onset Date:  11/15/22 (date of order; exacerbation 5/1/2022 following L finger and R thumb and R foot fractures and limited ability to perform lymphedmea home program)   Type:  PT   Medical Diagnosis:  lymphedema   Therapy Diagnosis:  lymphedema, fibrosis, impaired balance with h/o falls, decreased strength Visits from SOC:  1                                     __________________________________________________________________________________   Plan of Treatment/Functional Goals:    Manual lymph drainage, Gradient compression bandaging, Fit for compression garment, Exercises, Precautions to prevent infection / exacerbation, Education, Manual therapy, ADL training, Skin care / precautions, Soft tissue mobilization, Home management program development  CLT including GCB and MLD to address L UE lymphedema as well as assisting with home program managament due to multiple comorbidities and balance retrainging and falls prevention education     GOALS  1. Goal description: Patient will be independent in home program to manage conditions of lymphedema and fibrosis as well as impaired balance and h/o falls.       Target date: 04/05/23  2. Goal description: Patient will demonstrate decreased volume of L UE by 5% to decrease risk of infection.       Target date: 03/06/23  3. Goal description: Patient will demonstrate an 5 point decrease in LLIS to demonstrate a decreased impact of lymphedema on function.       Target date: 03/06/23  4. Goal  description: The patient will be able to maintain rhomberg stance x 30 seconds while demonstrating a normal degree of sway to demonstrate improved postural stability.       Target date: 03/06/23            Treatment Frequency: 3x/week   Treatment duration: x 2 weeks then decrease to 1x/week x 4 weeks then 2x/month x 1 month then 1x/month x 1 month    Jennifer Jauregui, PT                                    I CERTIFY THE NEED FOR THESE SERVICES FURNISHED UNDER        THIS PLAN OF TREATMENT AND WHILE UNDER MY CARE     (Physician co-signature of this document indicates review and certification of the therapy plan).                   Certification date from: 01/05/23       Certification date to: 04/05/23           Referring physician: Ashley Perry PA-C   Initial Assessment  See Epic Evaluation- Start of care: 01/05/23

## 2023-01-05 NOTE — PROGRESS NOTES
01/05/23 0800   Quick Adds   Quick Adds Certification   Rehab Discipline   Discipline PT   Type of Visit   Type of visit Initial Edema Evaluation   General Information   Start of care 01/05/23   Referring physician Ashley Perry PA-C   Orders Evaluate and treat as indicated   Order date 11/15/22   Medical diagnosis lymphedema   Onset of illness / date of surgery 11/15/22  (date of order)   Edema onset 11/15/22  (date of order; exacerbation 5/1/2022 following L finger and R thumb and R foot fractures and limited ability to perform lymphedmea home program)   Affected body parts LLE;Trunk   Edema etiology Cancer with lymph node dissection;Radiation;Chemo   Location - Cancer with lymph node dissection L breast cancer s/p B mastectomies and L LND +13/33 11/20/2007   Location - Radiation L chest   Radiation comments completed 2008   Chemotherapy comments completed 2008   Edema etiology comments chronic L UE/UQ lymphedema; h/o CLT with most recent in 11/11/2021 Home program: GCB, self MLD, compression pump, exercises, compression sleeve, gauntlet, velcro compression   Pertinent history of current problem (PT: include personal factors and/or comorbidities that impact the POC; OT: include additional occupational profile info) chronicity of lymphedema; multiple comorbidities including osteoporosis s/p fall on [email or web address] [place] 12/1/2022 resulting in L fibula in walking boot, and fall in [employer] parking lot  5/1/2022 Fx second R metatarsal and 4th L finger & R thumb resulting in limited ability to perform home program resulting in exacerbation of lymphedema, chronic LBP s/p lumbar fusion 11/18/2021, long term neuropathies with balance issues and h/o falls, athritis, HTN. s/p R uniliateral salpingoophorectomy d/t endometriosis in early 1990's, BALWINDER with LSO in 2000, h/o R wrist surgery d/t trauma 2005 and L wrist surgery d/t trauma 2013, dizziness since 2020 with pt stating she has received vestibular treatment with HEP in past.    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 Yes   Have you fallen and had an injury in the past year? Yes   Is patient a fall risk? Yes;Department fall risk interventions implemented   Fall screen comments fell 12/1/2022 fractured R fibular on [address], fell 5/1/2022 resulting in R second metatarsal and 4th finger and thumb   Abuse Screen (yes response referral indicated)   Feels Unsafe at Home or Work/School no   Feels Threatened by Someone no   Does Anyone Try to Keep You From Having Contact with Others or Doing Things Outside Your Home? no   Physical Signs of Abuse Present no   Patient needs abuse support services and resources No   Subjective Report   Patient report of symptoms exacerbation of lymphedema following L finger and R thumb fractures as unable to perform lymphedema home program   Patient / Family Goals   Patient / family goals statement decreased volume, assist in home program managment of chronic lymphedema and balance concerns   Pain   Pain comments chronic back   Vitals Signs   Weight 188lbs  (22lb  weight increase since 3/2022 due to limited activity)   BMI 29   Cognitive Status   Orientation Orientation to person, place and time   Edema Exam / Assessment   Skin condition Non-pitting;Intact   Skin condition comments increased nonpitting rubbery edema throughout L UE and lateral trunk wall   Capillary refill Symmetrical   Stemmer sign Negative   Girth Measurements   Girth Measurements Refer to separate girth measurement flowsheet   Volume UE   Left UE (mL) to 40cm = 2486ml   Range of Motion   ROM comments seated L shoulder flexion = 155; R=150   Strength   Strength comments decreased throughout d/t joint pain and weakness   Posture   Posture Forward head position;Protracted shoulders;Kyphosis   Activities of Daily Living   Activities of Daily Living Ind ADLs   Gait / Locomotion   Gait / Locomotion uses 4ww   Sensory   Sensory perception comments intact to light touch but LE neuropathies   Planned Edema Interventions   Planned edema interventions Manual lymph drainage;Gradient compression bandaging;Fit for compression garment;Exercises;Precautions to prevent infection / exacerbation;Education;Manual therapy;ADL training;Skin care / precautions;Soft tissue mobilization;Home management program development   Planned edema intervention comments CLT including GCB and MLD to address L UE lymphedema as well as assisting with home program managament due to multiple comorbidities and balance retrainging and falls prevention education   Clinical Impression   Criteria for skilled therapeutic intervention met Yes   Therapy diagnosis lymphedema, fibrosis, impaired balance with h/o falls, decreased strength   Influenced by the following impairments / conditions Edema;Stage 2   Functional limitations due to impairments / conditions elevaret risk of infections, elevated risk of falls,   Clinical Presentation Evolving/Changing   Clinical Presentation Rationale worsening status   Clinical Decision Making (Complexity) Moderate  complexity   Treatment Frequency 3x/week   Treatment duration x 2 weeks then decrease to 1x/week x 4 weeks then 2x/month x 1 month then 1x/month x 1 month   Patient / family and/or staff in agreement with plan of care Yes   Risks and benefits of therapy have been explained Yes   Clinical impression comments patient with chronic L UE/UQ lymphedmea and fibrosis s/p B mastectomies and L LND +13/33 radiation and chemo completed 2008. reporting exacerbation of lymphedema and difficulty managing HEP due to fall 5/1/2022 resulting in L finger and R thumb and R foot fractures as well as multiple  comorbidities including severe arthritis and pain, neuropathies h/o falls with recent fall 12/1/2022 resulting in L fibula fracture cureent in walking boot, chronic LBP s/p back surgery 11/2021, dizziness since 2020, osteoporosis, retired CNS.   Education Assessment   Preferred learning style Listening;Reading;Demonstration   Barriers to learning Physical   Goal 1   Goal identifier Home program   Goal description Patient will be independent in home program to manage conditions of lymphedema and fibrosis as well as impaired balance and h/o falls.   Target date 04/05/23   Goal 2   Goal identifier volume L UE   Goal description Patient will demonstrate decreased volume of L UE by 5% to decrease risk of infection.   Target date 03/06/23   Goal 3   Goal identifier LLIS   Goal description Patient will demonstrate an 5 point decrease in LLIS to demonstrate a decreased impact of lymphedema on function.   Target date 03/06/23   Goal 4   Goal identifier Balance   Goal description The patient will be able to maintain rhomberg stance x 30 seconds while demonstrating a normal degree of sway to demonstrate improved postural stability.   Target date 03/06/23   Total Evaluation Time   PT Eval, Moderate Complexity Minutes ([id number]) 30   Certification   Certification date from 01/05/23   Certification date to 04/05/23   Medical Diagnosis lymphedema    Certification I certify the need for these services furnished under this plan of treatment and while under my care.  (Physician co-signature of this document indicates review and certification of the therapy plan).

## 2023-01-10 DIAGNOSIS — S82.65XD CLOSED NONDISPLACED FRACTURE OF LATERAL MALLEOLUS OF LEFT FIBULA WITH ROUTINE HEALING, SUBSEQUENT ENCOUNTER: Primary | ICD-10-CM

## 2023-01-10 ASSESSMENT — PAIN SCALES - PAIN ENJOYMENT GENERAL ACTIVITY SCALE (PEG)
PEG_TOTALSCORE: 3.33
INTERFERED_GENERAL_ACTIVITY: 3
AVG_PAIN_PASTWEEK: 4
INTERFERED_ENJOYMENT_LIFE: 3

## 2023-01-11 ENCOUNTER — ANCILLARY PROCEDURE (OUTPATIENT)
Dept: GENERAL RADIOLOGY | Facility: CLINIC | Age: 73
End: 2023-01-11
Attending: FAMILY MEDICINE
Payer: COMMERCIAL

## 2023-01-11 ENCOUNTER — LAB (OUTPATIENT)
Dept: LAB | Facility: CLINIC | Age: 73
End: 2023-01-11
Payer: COMMERCIAL

## 2023-01-11 ENCOUNTER — OFFICE VISIT (OUTPATIENT)
Dept: ORTHOPEDICS | Facility: CLINIC | Age: 73
End: 2023-01-11
Payer: COMMERCIAL

## 2023-01-11 DIAGNOSIS — S82.65XA CLOSED NONDISPLACED FRACTURE OF LATERAL MALLEOLUS OF LEFT FIBULA, INITIAL ENCOUNTER: Primary | ICD-10-CM

## 2023-01-11 DIAGNOSIS — M80.08XG PATHOLOGICAL FRACTURE OF VERTEBRA DUE TO AGE-RELATED OSTEOPOROSIS WITH DELAYED HEALING, SUBSEQUENT ENCOUNTER: ICD-10-CM

## 2023-01-11 DIAGNOSIS — M79.2 NEUROPATHIC PAIN: ICD-10-CM

## 2023-01-11 LAB
ANION GAP SERPL CALCULATED.3IONS-SCNC: 13 MMOL/L (ref 7–15)
BUN SERPL-MCNC: 20.7 MG/DL (ref 8–23)
CALCIUM SERPL-MCNC: 10 MG/DL (ref 8.8–10.2)
CALCIUM SERPL-MCNC: 10 MG/DL (ref 8.8–10.2)
CHLORIDE SERPL-SCNC: 105 MMOL/L (ref 98–107)
CREAT SERPL-MCNC: 0.75 MG/DL (ref 0.51–0.95)
DEPRECATED HCO3 PLAS-SCNC: 23 MMOL/L (ref 22–29)
GFR SERPL CREATININE-BSD FRML MDRD: 85 ML/MIN/1.73M2
GLUCOSE SERPL-MCNC: 117 MG/DL (ref 70–99)
POTASSIUM SERPL-SCNC: 4.3 MMOL/L (ref 3.4–5.3)
SODIUM SERPL-SCNC: 141 MMOL/L (ref 136–145)

## 2023-01-11 PROCEDURE — 36415 COLL VENOUS BLD VENIPUNCTURE: CPT | Performed by: PATHOLOGY

## 2023-01-11 PROCEDURE — 99213 OFFICE O/P EST LOW 20 MIN: CPT | Performed by: FAMILY MEDICINE

## 2023-01-11 PROCEDURE — 80048 BASIC METABOLIC PNL TOTAL CA: CPT | Performed by: PATHOLOGY

## 2023-01-11 PROCEDURE — 73610 X-RAY EXAM OF ANKLE: CPT | Mod: LT | Performed by: RADIOLOGY

## 2023-01-11 NOTE — PATIENT INSTRUCTIONS
Ice, elevation  Tylenol taken    PT to start in about 2 weeks    Plan to have f/up ankle and Evenity injection at same visit next month

## 2023-01-11 NOTE — PROGRESS NOTES
ASSESSMENT/PLAN:    71 yo white female with f/up for left fibula fracture.  Improving swelling and less painful over lateral malleolus  Continue boot for next 2 weeks  ROM exercises at ankle  Formal PT to start in 2 weeks  Recommend RTC 4 weeks so we can also do Evenity injection  Labs for osteoporosis, monitorying Evenity    Pt is a 72 year old female here today for:     Left ankle fracture f/u.     ESTABLISHED PATIENT FOLLOW-UP:  Pain and Edema of the Left Ankle       HISTORY OF PRESENT ILLNESS  Ms. Perez is a pleasant 72 year old year old female who presents to clinic today for follow-up of left ankle fracture.  Pt has noted swelling and mild redness around the anterior ankle/tibia.  Pt using a mild compression sock.  Stopped icing some time ago.  Pt has been elevating and noted some decrease in swelling.  Pt reports she continues to use the boot.    Date of injury: 12/1/22  Date last seen: 12/16/22  Following Therapeutic Plan: Yes   Pain: Improving   Function: Improving  Interval History: Doing well, yesterday was first day with no significant swelling. Pain has decreased.      Additional medical/Social/Surgical histories reviewed in [facility] and updated as appropriate.    REVIEW OF SYSTEMS (1/11/2023)  CONSTITUTIONAL: Denies fever and weight loss  GASTROINTESTINAL: Denies abdominal pain, nausea, vomiting  MUSCULOSKELETAL: See HPI  SKIN: Denies any recent rash or lesion  NEUROLOGICAL: Denies numbness or focal weakness      EXAM:     LEFT Foot/Ankle:   Inspection: Swelling -noted lateral left ankle; Bruising - none   Sensation: intact in peroneal, tibial, sural, and saphenous distribution   ROM: Dorsiflexion - decreased; Plantarflexion - decreased; Inversion -decreased; Eversion - intact;    Strength: 4+/5 in all motions; Pain w/ resisted inversion  Bony tenderness: Medial malleolus? - none; Lateral malleolus? - yes; Navicular? - none; 5th Metatarsal? - none; Other - none  Ligaments Tenderness: ATFL/CFL -tender;  Deltoid - none; Syndesmosis - none; LisFranc - none  Tendons: Posterior Tibialis - none; Peroneals - mild; Achilles - none   Maneuvers: Anterior Drawer - none; Talar Tilt - none; Squeeze - none; Hop - not performed; Cheek - not performed    Past Medical History:   Diagnosis Date     Arthritis      Bone disease      Breast cancer (H)      H/O kyphoplasty      Hearing problem      History of kidney stones      History of radiation therapy      Hyperlipemia      Hypertension      Hypopotassemia      Kidney problem      Lymph edema      Medullary sponge kidney      Osteopenia      PONV (postoperative nausea and vomiting)      Reduced vision      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 [place]     CATARACT IOL, RT/LT Right 03/13/2018     CATARACT IOL, RT/LT Left 02/20/2018     COLONOSCOPY  12/24/2013    Procedure: COMBINED COLONOSCOPY, SINGLE BIOPSY/POLYPECTOMY BY BIOPSY;  COLONOSCOPY;  Surgeon: Dom Alvarez MD;  Location:  GI     COSMETIC BLEPHAROPLASTY LOWER LIDS BILATERAL Bilateral 5/6/2022    Procedure: BLEPHAROPLASTY, LOWER EYELID, BILATERAL, COSMETIC;  Surgeon: Jemal Sanchez MD;  [place] [facility] [place] [place]     ESOPHAGOSCOPY, GASTROSCOPY, DUODENOSCOPY (EGD), COMBINED N/A 11/23/2016    Procedure: COMBINED ESOPHAGOSCOPY, GASTROSCOPY, DUODENOSCOPY (EGD);  Surgeon: Quinten Feliciano MD;  Location:  GI     EXTERNAL EAR SURGERY      right     EYE SURGERY      radial keratomy      FUSION, SPINE, INTERBODY, OBLIQUE ANTERIOR AND LUMBAR, 2 LEVELS, POST APPROACH, USING OTS N/A 11/18/2021    Procedure: Part 1: Oblique Anterior Interbody Fusion at Lumbar 5 to sacral 1 with use of Bone Morphogenic Protein,;  Surgeon: Serge Ramirez MD;  [facility]     GRAFT BONE FROM ILIAC CREST  02/14/2013    Procedure: GRAFT BONE FROM ILIAC CREST;  mac with block and local infilitration;  Surgeon: Av Mendez MD;  [facility]     HC BREATH HYDROGEN TEST N/A 10/14/2016    Procedure: HYDROGEN BREATH TEST;  Surgeon: Cheri Barron MD;  [facility]     HERNIA REPAIR      umbilical age 18 mos.     HYSTERECTOMY TOTAL ABDOMINAL  05/03/2000     MASTECTOMY MODIFIED RADICAL Bilateral     bilateral; right breast prophylactic     OPTICAL TRACKING SYSTEM FUSION POSTERIOR SPINE LUMBAR N/A 11/18/2021    Procedure: Open Posterior Instrumented Spinal Fusion at Lumbar 5 to Sacral 1, with grimm aguayo osteotomy, use of O-Arm/Stealth;  Surgeon: Serge Ramirez MD;  [facility]     PARATHYROIDECTOMY  09/23/2004    R SUPERIOR     PARATHYROIDECTOMY  09/23/2004    parathyroid resection, subtotal     RELEASE CARPAL TUNNEL Right 12/2/2021    Procedure: RIGHT CARPAL TUNNEL RELEASE, RIGHT WRIST HARDWARE REMOVAL, RIGHT CARPAL BOSS EXCISION;  Surgeon: Rolan Conley MD;  [place] [facility] [place] [place]     REMOVE HARDWARE HAND  09/24/2013    Procedure: REMOVE HARDWARE HAND;  Left Hand Screw Removal        RHINOPLASTY  1968     thyr proc skin closed cosmetic manner by subcuticular stitch  01/23/2009     THYROPLASTY  10/09/2009     TONSILLECTOMY  1977     VITRECTOMY PARSPLANA WITH 25 GAUGE SYSTEM Left 6/20/2022    Procedure: LEFT EYE VITRECTOMY, PARS PLANA APPROACH, USING 25-GAUGE INSTRUMENTS, laser;  Surgeon: Felix Carlos MD;  Location: [facility] [place] [place]     WRIST SURGERY      wrist arthrodesis      Current Outpatient Medications   Medication Sig Dispense Refill     acetaminophen  (TYLENOL) 500 MG tablet Take 500-1,000 mg by mouth every 6 hours as needed for mild pain       amLODIPine (NORVASC) 10 MG tablet Take 1 tablet (10 mg) by mouth daily 90 tablet 3     Ascorbic Acid (VITAMIN C) 500 MG CHEW Take 1 tablet by mouth daily       atorvastatin (LIPITOR) 80 MG tablet Take 1 tablet (80 mg) by mouth daily 90 tablet 3     Cholecalciferol (VITAMIN D3) 50 MCG (2000 UT) CAPS TAKE 3 CAPSULES (6,000 UNITS) BY MOUTH DAILY. 270 capsule 3     CRANBERRY EXTRACT PO Take 650 mg by mouth daily ~10 am  Takes 1       diclofenac (VOLTAREN) 1 % topical gel APPLY 4 GRAMS TO KNEES OR 2 GRAMS TO HANDS FOUR TIMES DAILY USING ENCLOSED DOSING CARD. 100 g 3     gabapentin (NEURONTIN) 300 MG capsule Take 4 capsules (1,200 mg) by mouth 3 times daily 1080 capsule 3     HYDROcodone-acetaminophen (NORCO) 5-325 MG tablet Take 1 tablet by mouth every 6 hours as needed for severe pain (Patient taking differently: Take 1 tablet by mouth every 6 hours as needed for severe pain (7-10) Use as neede , 1-2 pills per week) 30 tablet 0     hydrOXYzine (VISTARIL) 25 MG capsule Take 1 capsule (25 mg) by mouth At Bedtime 90 capsule 3     ibuprofen (ADVIL/MOTRIN) 600 MG tablet Take 1 tablet (600 mg) by mouth every 6 hours as needed for other (mild and/or inflammatory pain) 30 tablet 0     levothyroxine (SYNTHROID/LEVOTHROID) 112 MCG tablet TAKE 1/2 TABLET BY MOUTH DAILY 45 tablet 3     lisinopril (ZESTRIL) 40 MG tablet Take 1 tablet (40 mg) by mouth daily 90 tablet 3     magnesium 250 MG tablet Take 2 tablets by mouth daily       melatonin 5 MG tablet Take 10 mg by mouth At Bedtime        methocarbamol (ROBAXIN) 500 MG tablet Take 1 tablet (500 mg) by mouth 3 times daily (Patient taking differently: Take 500 mg by mouth 3 times daily as needed) 40 tablet 4     metoprolol succinate ER (TOPROL XL) 50 MG 24 hr tablet Take 1 tablet (50 mg) by mouth daily 90 tablet 3     Multiple Vitamin (MULTI-VITAMIN) per tablet Take 1 tablet by mouth daily        naloxone (NARCAN) 4 MG/0.1ML nasal spray Spray 1 spray (4 mg) into one nostril alternating nostrils once as needed for opioid reversal every 2-3 minutes until assistance arrives (Patient taking differently: Spray 4 mg into one nostril alternating nostrils once as needed for opioid reversal every 2-3 minutes until assistance arrives  Use only if needed) 0.2 mL 0     ondansetron (ZOFRAN-ODT) 4 MG ODT tab Take 1 tablet (4 mg) by mouth every 12 hours as needed for nausea 180 tablet 3     senna-docusate (SENOKOT-S/PERICOLACE) 8.6-50 MG tablet Take 2 tablets by mouth 2 times daily 30 tablet 0     spironolactone (ALDACTONE) 25 MG tablet Take 2 tablets (50 mg) by mouth daily 180 tablet 3     Vaginal Lubricant (REPHRESH) GEL Place 2 g vaginally every 3 days 2 g 3      Allergies   Allergen Reactions     Erythromycin Nausea     Penicillins Hives     Around age 6 - doesn't recall full reaction, mother told her it was hives.     Has tolerated cephalsporins.       ROS:   Gen- no fevers/chills   Rheum - no morning stiffness   Derm - no rash/ redness   Neuro - no numbness, no tingling   Remainder of ROS negative.     Exam:   There were no vitals taken for this visit.     Xray of left ankle on January 11, 2023 at [facility] [place] location - films personally reviewed with patient at time of visit     My impression: evidence for healing of left fibular fracture    Radiology:  Impression:  1. Ongoing healing distal fibular fracture.   2. Additional presumed avulsion fracture along the expected area of  flexor retinacular attachment.  3. Redemonstration osteochondral lesion/collapse medial talar dome.    COREEN Clark MD

## 2023-01-11 NOTE — PROGRESS NOTES
ACUPUNCTURIST TREATMENT NOTE      Nadira Perez, a 72 year old female, is here today for Follow - Up exam. Patient is referred by Anyiwe.    HPI  Main Complaint: Chronic low back pain: Missed her appt last week due to bad weather. Today rates pain about 5/10 after taking pain medications. Most of the pain is in L side, but this morning notes she did have some right sided pain as well.    Secondary Complaints: LLE pain due to fibula fracture 12/1/22: She is feeling well, no pain.    Past Medical History  Past Medical History Reviewed: Yes   has a past medical history of Arthritis, Bone disease, Breast cancer (H), H/O kyphoplasty, Hearing problem, History of kidney stones, History of radiation therapy, Hyperlipemia, Hypertension, Hypopotassemia, Kidney problem, Lymph edema, Medullary sponge kidney, Osteopenia, PONV (postoperative nausea and vomiting), Reduced vision, Squamous cell skin cancer, and Thyroid disease.    Objective  Basic Exam Completed:   No    TCM Exam Completed: Yes   Limbs/Back: Lower Back    Tongue/Pulse Exam Completed: No    Patient Assessment    PEG: A Three-Item Scale Assessing Pain Intensity and Interference    What number best describes your PAIN ON AVERAGE in the past week? 4    What number best describes how, during the past week, pain has interfered with your ENJOYMENT OF LIFE? 3    What number best describes how, during the past week, pain has interfered with your GENERAL ACTIVITY? 3    PEG Total Score: 3.33    Marin CROWE, Rocky KA, Maxwell MJ, Eden COHEN, Jarvis J, Ken JM, Pooja SM, Jewel K. Development and initial validation of the PEG, a 3-item scale assessing pain intensity and interference. Journal of General Internal Medicine. 2009 Charlie;24:733-738.    Patient Type: Pain  Patient Complaint: Chronic low back pain    Acupuncture 12/27/2022 1/12/2023   Intervention Reason Pain; Pain 2 Pain   Pain Location low back low back   Pre-session Pain Rating 6 4   Post-session Pain Rating 1 1   Pain  2 Location LLE -   Pre-session Pain 2 Rating 2 -   Post-session Pain 2 Rating - -   Pain 3 Location - -   Pre-session Pain 3 Rating - -   Post-session Pain 3 Rating - -       TCM Diagnosis: Qi Stagnation;Blood Stagnation;Kidney Qi Deficiency      Treatment Principle: channel obstruction of Sreekanth Colon and Billy Colon; heart fire    TCM / Acupuncture Treatment  Acupuncture Points:       Initial insertions: Baihui, HTJJ L4-L5, Shiqizhuixia, Ub25-26, Yaoyan       Second insertions: Ub27-28, Ub31-32, Gb29, Gb34, Ub57, Si6, Tb5. (R) Ub62, (L) Si3                    Accessory Techniques 12/27/2022 1/12/2023   Accessory Techniques TDP Heat Lamp TDP Heat Lamp   TDP Heat Lamp location used low back low back   [place] location used - low back   Topicals - Po Sum On   Topicals location used - low back     Oswestry Disability Index (JOHN   Nish Weeks 1980, All rights reserved) 8/8/2022 11/3/2022   Section 1 - Pain intensity The pain is moderate at the moment. The pain is moderate at the moment.   Section 2 - Personal care (washing, dressing, etc.)  I need some help but manage most of my personal care. I can look after myself normally but it is very painful.   Section 3 - Lifting I can only lift very light weights. I can only lift very light weights.   Section 4 - Walking I can only walk using a cane or crutches. Pain prevents me from walking more than a quarter of a mile.   Section 5 - Sitting Pain prevents me from sitting for more than half an hour. Pain prevents me from sitting for more than 1 hour.   Section 6 - Standing Pain prevents me from standing for more than 10 minutes. Pain prevents me from standing for more than half an hour.   Section 7 - Sleeping Because of pain I have less than 4 hours sleep. Because of pain I have less than 6 hours sleep.   Section 8 - Sex life (if applicable) Not answered/NA Not answered/NA   Section 9 - Social life Pain has restricted my social life and I do not go out as often. Pain has  restricted my social life and I do not go out as often.   Section 10 - Traveling I can travel anywhere without pain. I can travel anywhere without pain.   Oswestry Score (%) 57.78 42.22       Assessment and Plan  Treatment Observations: Patient relaxed well during treatment  Acupuncture Treatment Recommendations: Diagnoses for treatment: Z98.1 (ICD-10-CM) - S/P spinal fusion  M54.50, G89.29 (ICD-10-CM) - Chronic low back pain, Other low back pain.       It is my recommendation that this patient seek advice from their Primary Care Provider about active symptoms not addressed during this visit. The risks and benefits of acupuncture were reviewed and the patient stated understanding. The patient's questions were answered to their satisfaction. Consent was provided for treatment. We thank you for the referral and opportunity to treat this patient.    Time Spent with Patient:   I spent a total of 30 minutes face-to-face with Nadira Perez during today's office visit.     Zara Rosa L.Ac.  01/12/23

## 2023-01-11 NOTE — LETTER
1/11/2023      RE: Nadira Perez  [id number]  [facility] [id number]     Dear Colleague,    Thank you for referring your patient, Nadira Perez, to the [facility] [place]. Please see a copy of my visit note below.    ASSESSMENT/PLAN:    73 yo white female with f/up for left fibula fracture.  Improving swelling and less painful over lateral malleolus  Continue boot for next 2 weeks  ROM exercises at ankle  Formal PT to start in 2 weeks  Recommend RTC 4 weeks so we can also do Evenity injection  Labs for osteoporosis, monitorying Evenity    Pt is a 72 year old female here today for:     Left ankle fracture f/u.     ESTABLISHED PATIENT FOLLOW-UP:  Pain and Edema of the Left Ankle       HISTORY OF PRESENT ILLNESS  Ms. Perez is a pleasant 72 year old year old female who presents to clinic today for follow-up of left ankle fracture.  Pt has noted swelling and mild redness around the anterior ankle/tibia.  Pt using a mild compression sock.  Stopped icing some time ago.  Pt has been elevating and noted some decrease in swelling.  Pt reports she continues to use the boot.    Date of injury: 12/1/22  Date last seen: 12/16/22  Following Therapeutic Plan: Yes   Pain: Improving   Function: Improving  Interval History: Doing well, yesterday was first day with no significant swelling. Pain has decreased.      Additional medical/Social/Surgical histories reviewed in [facility] and updated as appropriate.    REVIEW OF SYSTEMS (1/11/2023)  CONSTITUTIONAL: Denies fever and weight loss  GASTROINTESTINAL: Denies abdominal pain, nausea, vomiting  MUSCULOSKELETAL: See HPI  SKIN: Denies any recent rash or lesion  NEUROLOGICAL: Denies numbness or focal weakness      EXAM:     LEFT Foot/Ankle:   Inspection: Swelling -noted lateral left ankle; Bruising - none   Sensation: intact in peroneal, tibial, sural, and saphenous distribution   ROM: Dorsiflexion - decreased; Plantarflexion - decreased;  Inversion -decreased; Eversion - intact;    Strength: 4+/5 in all motions; Pain w/ resisted inversion  Bony tenderness: Medial malleolus? - none; Lateral malleolus? - yes; Navicular? - none; 5th Metatarsal? - none; Other - none  Ligaments Tenderness: ATFL/CFL -tender; Deltoid - none; Syndesmosis - none; LisFranc - none  Tendons: Posterior Tibialis - none; Peroneals - mild; Achilles - none   Maneuvers: Anterior Drawer - none; Talar Tilt - none; Squeeze - none; Hop - not performed; Cheek - not performed    Past Medical History:   Diagnosis Date     Arthritis      Bone disease      Breast cancer (H)      H/O kyphoplasty      Hearing problem      History of kidney stones      History of radiation therapy      Hyperlipemia      Hypertension      Hypopotassemia      Kidney problem      Lymph edema      Medullary sponge kidney      Osteopenia      PONV (postoperative nausea and vomiting)      Reduced vision      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     Thyroid disease       Past Surgical History:   Procedure Laterality Date     ABDOMEN SURGERY      ovarian cyst, mesh     ARTHRODESIS WRIST Right      ARTHRODESIS WRIST  02/14/2013    Procedure: ARTHRODESIS WRIST;  left wrist scaphoid excision, four bone fusion, iliac crest bone graft  ( Mac with block);  Surgeon: Av Mendez MD;  [facility]     BIOPSY      skin, vaginal     BLEPHAROPLASTY BILATERAL Bilateral 5/6/2022    Procedure: UPPER BLEPHAROPLASTY, BILATERAL;  Surgeon: Jemal Sanchez MD;  Location: [facility] [place] [facility] OR     CATARACT IOL, RT/LT Right 03/13/2018     CATARACT IOL, RT/LT Left 02/20/2018     COLONOSCOPY  12/24/2013    Procedure: COMBINED COLONOSCOPY, SINGLE BIOPSY/POLYPECTOMY BY BIOPSY;  COLONOSCOPY;  Surgeon: Dom Alvarez MD;  Location:  GI     COSMETIC BLEPHAROPLASTY LOWER LIDS BILATERAL Bilateral 5/6/2022    Procedure: BLEPHAROPLASTY, LOWER EYELID, BILATERAL, COSMETIC;  Surgeon: Jemal Sanchez MD;  Location: [facility] [place]      ESOPHAGOSCOPY, GASTROSCOPY, DUODENOSCOPY (EGD), COMBINED N/A 11/23/2016    Procedure: COMBINED ESOPHAGOSCOPY, GASTROSCOPY, DUODENOSCOPY (EGD);  Surgeon: Quinten Feliciano MD;  Location:  GI     EXTERNAL EAR SURGERY      right     EYE SURGERY      radial keratomy     FUSION, SPINE, INTERBODY, OBLIQUE ANTERIOR AND LUMBAR, 2 LEVELS, POST APPROACH, USING OTS N/A 11/18/2021    Procedure: Part 1: Oblique Anterior Interbody Fusion at Lumbar 5 to sacral 1 with use of Bone Morphogenic Protein,;  Surgeon: Serge Ramirez MD;  [facility]     GRAFT BONE FROM ILIAC CREST  02/14/2013    Procedure: GRAFT BONE FROM ILIAC CREST;  mac with block and local infilitration;  Surgeon: Av Mendez MD;  [facility]: [facility]     HC BREATH HYDROGEN TEST N/A 10/14/2016    Procedure: HYDROGEN BREATH TEST;  Surgeon: Cheri Barron MD;  Location:  GI     HERNIA REPAIR      umbilical age 18 mos.     HYSTERECTOMY TOTAL ABDOMINAL  05/03/2000     MASTECTOMY MODIFIED RADICAL Bilateral     bilateral; right breast prophylactic     OPTICAL TRACKING SYSTEM FUSION POSTERIOR SPINE LUMBAR N/A 11/18/2021    Procedure: Open Posterior Instrumented Spinal Fusion at Lumbar 5 to Sacral 1, with grimm aguayo osteotomy, use of O-Arm/Stealth;  Surgeon: Serge Ramirez MD;  [facility]     PARATHYROIDECTOMY  09/23/2004    R SUPERIOR     PARATHYROIDECTOMY  09/23/2004    parathyroid resection, subtotal     RELEASE CARPAL TUNNEL Right 12/2/2021    Procedure: RIGHT CARPAL TUNNEL RELEASE, RIGHT WRIST HARDWARE REMOVAL, RIGHT CARPAL BOSS EXCISION;  Surgeon: Rolan Conley MD;  Location: [facility] OR     REMOVE HARDWARE HAND  09/24/2013    Procedure: REMOVE HARDWARE HAND;  Left Hand Screw Removal        RHINOPLASTY  1968     thyr proc skin closed cosmetic manner by subcuticular stitch  01/23/2009     THYROPLASTY  10/09/2009     TONSILLECTOMY  1977     VITRECTOMY PARSPLANA WITH 25 GAUGE SYSTEM Left 6/20/2022    Procedure: LEFT  EYE VITRECTOMY, PARS PLANA APPROACH, USING 25-GAUGE INSTRUMENTS, laser;  Surgeon: Felix Carlos MD;  Location: [facility] OR     WRIST SURGERY      wrist arthrodesis      Current Outpatient Medications   Medication Sig Dispense Refill     acetaminophen (TYLENOL) 500 MG tablet Take 500-1,000 mg by mouth every 6 hours as needed for mild pain       amLODIPine (NORVASC) 10 MG tablet Take 1 tablet (10 mg) by mouth daily 90 tablet 3     Ascorbic Acid (VITAMIN C) 500 MG CHEW Take 1 tablet by mouth daily       atorvastatin (LIPITOR) 80 MG tablet Take 1 tablet (80 mg) by mouth daily 90 tablet 3     Cholecalciferol (VITAMIN D3) 50 MCG (2000 UT) CAPS TAKE 3 CAPSULES (6,000 UNITS) BY MOUTH DAILY. 270 capsule 3     CRANBERRY EXTRACT PO Take 650 mg by mouth daily ~10 am  Takes 1       diclofenac (VOLTAREN) 1 % topical gel APPLY 4 GRAMS TO KNEES OR 2 GRAMS TO HANDS FOUR TIMES DAILY USING ENCLOSED DOSING CARD. 100 g 3     gabapentin (NEURONTIN) 300 MG capsule Take 4 capsules (1,200 mg) by mouth 3 times daily 1080 capsule 3     HYDROcodone-acetaminophen (NORCO) 5-325 MG tablet Take 1 tablet by mouth every 6 hours as needed for severe pain (Patient taking differently: Take 1 tablet by mouth every 6 hours as needed for severe pain (7-10) Use as neede , 1-2 pills per week) 30 tablet 0     hydrOXYzine (VISTARIL) 25 MG capsule Take 1 capsule (25 mg) by mouth At Bedtime 90 capsule 3     ibuprofen (ADVIL/MOTRIN) 600 MG tablet Take 1 tablet (600 mg) by mouth every 6 hours as needed for other (mild and/or inflammatory pain) 30 tablet 0     levothyroxine (SYNTHROID/LEVOTHROID) 112 MCG tablet TAKE 1/2 TABLET BY MOUTH DAILY 45 tablet 3     lisinopril (ZESTRIL) 40 MG tablet Take 1 tablet (40 mg) by mouth daily 90 tablet 3     magnesium 250 MG tablet Take 2 tablets by mouth daily       melatonin 5 MG tablet Take 10 mg by mouth At Bedtime        methocarbamol (ROBAXIN) 500 MG tablet Take 1 tablet (500 mg) by mouth 3 times daily (Patient  taking differently: Take 500 mg by mouth 3 times daily as needed) 40 tablet 4     metoprolol succinate ER (TOPROL XL) 50 MG 24 hr tablet Take 1 tablet (50 mg) by mouth daily 90 tablet 3     Multiple Vitamin (MULTI-VITAMIN) per tablet Take 1 tablet by mouth daily       naloxone (NARCAN) 4 MG/0.1ML nasal spray Spray 1 spray (4 mg) into one nostril alternating nostrils once as needed for opioid reversal every 2-3 minutes until assistance arrives (Patient taking differently: Spray 4 mg into one nostril alternating nostrils once as needed for opioid reversal every 2-3 minutes until assistance arrives  Use only if needed) 0.2 mL 0     ondansetron (ZOFRAN-ODT) 4 MG ODT tab Take 1 tablet (4 mg) by mouth every 12 hours as needed for nausea 180 tablet 3     senna-docusate (SENOKOT-S/PERICOLACE) 8.6-50 MG tablet Take 2 tablets by mouth 2 times daily 30 tablet 0     spironolactone (ALDACTONE) 25 MG tablet Take 2 tablets (50 mg) by mouth daily 180 tablet 3     Vaginal Lubricant (REPHRESH) GEL Place 2 g vaginally every 3 days 2 g 3      Allergies   Allergen Reactions     Erythromycin Nausea     Penicillins Hives     Around age 6 - doesn't recall full reaction, mother told her it was hives.     Has tolerated cephalsporins.       ROS:   Gen- no fevers/chills   Rheum - no morning stiffness   Derm - no rash/ redness   Neuro - no numbness, no tingling   Remainder of ROS negative.     Exam:   There were no vitals taken for this visit.     Xray of left ankle on January 11, 2023 at [facility] [place] location - films personally reviewed with patient at time of visit     My impression: evidence for healing of left fibular fracture    Radiology:  Impression:  1. Ongoing healing distal fibular fracture.   2. Additional presumed avulsion fracture along the expected area of  flexor retinacular attachment.  3. Redemonstration osteochondral lesion/collapse medial talar dome.    COREEN Clark MD

## 2023-01-12 ENCOUNTER — OFFICE VISIT (OUTPATIENT)
Dept: PALLIATIVE MEDICINE | Facility: OTHER | Age: 73
End: 2023-01-12
Payer: COMMERCIAL

## 2023-01-12 DIAGNOSIS — M54.59 OTHER LOW BACK PAIN: ICD-10-CM

## 2023-01-12 DIAGNOSIS — M54.50 CHRONIC BILATERAL LOW BACK PAIN, UNSPECIFIED WHETHER SCIATICA PRESENT: ICD-10-CM

## 2023-01-12 DIAGNOSIS — Z98.1 S/P SPINAL FUSION: Primary | ICD-10-CM

## 2023-01-12 DIAGNOSIS — G89.29 CHRONIC BILATERAL LOW BACK PAIN, UNSPECIFIED WHETHER SCIATICA PRESENT: ICD-10-CM

## 2023-01-12 PROCEDURE — 97811 ACUP 1/> W/O ESTIM EA ADD 15: CPT | Performed by: ACUPUNCTURIST

## 2023-01-12 PROCEDURE — 97810 ACUP 1/> WO ESTIM 1ST 15 MIN: CPT | Performed by: ACUPUNCTURIST

## 2023-01-13 ENCOUNTER — ONCOLOGY VISIT (OUTPATIENT)
Dept: ONCOLOGY | Facility: CLINIC | Age: 73
End: 2023-01-13
Attending: PHYSICIAN ASSISTANT
Payer: COMMERCIAL

## 2023-01-13 VITALS
HEART RATE: 96 BPM | HEIGHT: 67 IN | SYSTOLIC BLOOD PRESSURE: 131 MMHG | RESPIRATION RATE: 16 BRPM | OXYGEN SATURATION: 98 % | WEIGHT: 186.1 LBS | DIASTOLIC BLOOD PRESSURE: 86 MMHG | BODY MASS INDEX: 29.21 KG/M2 | TEMPERATURE: 99.1 F

## 2023-01-13 DIAGNOSIS — G62.9 PERIPHERAL POLYNEUROPATHY: ICD-10-CM

## 2023-01-13 DIAGNOSIS — Z17.0 MALIGNANT NEOPLASM OF BREAST IN FEMALE, ESTROGEN RECEPTOR POSITIVE, UNSPECIFIED LATERALITY, UNSPECIFIED SITE OF BREAST (H): Primary | ICD-10-CM

## 2023-01-13 DIAGNOSIS — M21.371 RIGHT FOOT DROP: ICD-10-CM

## 2023-01-13 DIAGNOSIS — C50.919 MALIGNANT NEOPLASM OF BREAST IN FEMALE, ESTROGEN RECEPTOR POSITIVE, UNSPECIFIED LATERALITY, UNSPECIFIED SITE OF BREAST (H): Primary | ICD-10-CM

## 2023-01-13 DIAGNOSIS — I89.0 LYMPHEDEMA: ICD-10-CM

## 2023-01-13 LAB
TOTAL PROTEIN SERUM FOR ELP: 7.1 G/DL (ref 6.4–8.3)
VIT B12 SERPL-MCNC: 619 PG/ML (ref 232–1245)

## 2023-01-13 PROCEDURE — 84207 ASSAY OF VITAMIN B-6: CPT | Performed by: STUDENT IN AN ORGANIZED HEALTH CARE EDUCATION/TRAINING PROGRAM

## 2023-01-13 PROCEDURE — G0463 HOSPITAL OUTPT CLINIC VISIT: HCPCS

## 2023-01-13 PROCEDURE — 84165 PROTEIN E-PHORESIS SERUM: CPT | Mod: TC | Performed by: PATHOLOGY

## 2023-01-13 PROCEDURE — 86334 IMMUNOFIX E-PHORESIS SERUM: CPT | Performed by: PATHOLOGY

## 2023-01-13 PROCEDURE — 84155 ASSAY OF PROTEIN SERUM: CPT | Performed by: STUDENT IN AN ORGANIZED HEALTH CARE EDUCATION/TRAINING PROGRAM

## 2023-01-13 PROCEDURE — 99205 OFFICE O/P NEW HI 60 MIN: CPT | Performed by: STUDENT IN AN ORGANIZED HEALTH CARE EDUCATION/TRAINING PROGRAM

## 2023-01-13 PROCEDURE — 99212 OFFICE O/P EST SF 10 MIN: CPT | Performed by: STUDENT IN AN ORGANIZED HEALTH CARE EDUCATION/TRAINING PROGRAM

## 2023-01-13 PROCEDURE — 82607 VITAMIN B-12: CPT | Performed by: STUDENT IN AN ORGANIZED HEALTH CARE EDUCATION/TRAINING PROGRAM

## 2023-01-13 PROCEDURE — 36415 COLL VENOUS BLD VENIPUNCTURE: CPT | Performed by: STUDENT IN AN ORGANIZED HEALTH CARE EDUCATION/TRAINING PROGRAM

## 2023-01-13 PROCEDURE — 36415 COLL VENOUS BLD VENIPUNCTURE: CPT

## 2023-01-13 ASSESSMENT — PAIN SCALES - GENERAL: PAINLEVEL: MODERATE PAIN (4)

## 2023-01-13 NOTE — PROGRESS NOTES
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    Oncology History:  -Diagnosis: 2007. 57 years old. Presented with left-sided breast discomfort which extended into the axilla. Clinical stage IIIC (T4d N3 M0), infiltrating ductal carcinoma, ER/OH positive, HER2-oneida negative. She had skin and areolar complex thickening consistent with inflammatory breast cancer.   -Neoadjuvant chemotherapy: She received FEC (Fluorouracil, Epirubicin, Cyclosphamide) for 3 cycles through 2007. This was followed by Taxotere for 3 cycles completed 10/26/2007. Estimated cumulative epirubicin dosing was 300 mg/m2 (900 mg/m2 is cumulative lifetime dose).   -Surgery: 2007 left-sided mastectomy with axillary lymph node dissection. 1 cm residual carcinoma with DCIS.  lymph nodes were positive, largest was 0.6 cm with extracapsular extension.   -Radiation: She received radiation to the left chest wall at a dose of 6440 cGy, to the left supraclavicular fossa at a dose of 5040 cGy, and to the left internal mammary chain at a dose of 5080 cGy.  Last dose of radiation was administered on 2008.         -Endocrine therapy: Arimidex 2007-2012, Tamoxifen 2012-2017, Arimidex 2017-2021. 14 years of endocrine therapy.   -Genetic testing: no  -Other information: s/p right sided prophylactic mastectomy 2007. Zometa every 6 months from 2017-2021 (4 years).   - Saw Ashley Perry  in Survivorship clinic on 11/15/22.  She fell and broke her 2nd right metatarsal, right fibula, and 4th finger, and thumb. She has chronic low back  pain and had back surgery last year. She had a CT of her lumbar spine 5/2022 negative for metastatic disease. Peripheral neuropathy precedes cancer diagnosis, wanted referral to PM&R for evaluation of neuropathy. Seeing Neurology for dizziness, Brain MRI in 2020 negative, continues with lymphedema, wants a referral to PT.      Symptoms,  Patient presents for an initial evaluation today.  She had a lumbar fusion in Dec 21, left lumbar fusion (L5-S1). She complains of paresthesias on the outer aspects of both legs.   She is going to restart PT.   She walks with a walker, and is struggling with balance difficulties.   She has been complaining of numbness, tingling, pain in her legs at least 3 years before that. She did not have low back pain at that time when she noticed the neuropathic symptoms. She had seen Neurology at that time, and was told she has neuritis.   She does not endorse any current low back pain that radiates down either of her lower extremities. More discomfort than pain.   Her fall in December caused the left fibular fracture. She has some edema in that leg.  She has bad osteoporosis and was started on medication by Dr. Clark.  She feels her neuropathic symptoms have progressed distal to proximally over a period, pointing up to mid shin. She also feels like she has sciatic symptoms coming down both legs at the same time.  She is on 1200 mg (TID). She feels that maybe is helping with her neuropathic symptoms.        Therapies/HEP,  Is planning to restart PT following her fibular fracture and for lymphedema.      Functionally,   Modified independent with mobility using walker or cane.             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     Thyroid disease      Past Surgical History:   Procedure Laterality Date     ABDOMEN SURGERY      ovarian cyst, mesh     ARTHRODESIS WRIST Right      ARTHRODESIS WRIST  02/14/2013    Procedure: ARTHRODESIS WRIST;  left wrist scaphoid excision, four bone fusion, iliac crest bone graft  ( Mac with block);  Surgeon: Av Mendez MD;  [facility]     BIOPSY      skin, vaginal     BLEPHAROPLASTY BILATERAL Bilateral 5/6/2022    Procedure: UPPER BLEPHAROPLASTY, BILATERAL;  Surgeon: Jemal Sanchez MD;  Location: [facility] OR     CATARACT IOL, RT/LT Right 03/13/2018     CATARACT IOL, RT/LT Left 02/20/2018     COLONOSCOPY  12/24/2013    Procedure: COMBINED COLONOSCOPY, SINGLE BIOPSY/POLYPECTOMY BY BIOPSY;  COLONOSCOPY;  Surgeon: Dom Alvarez MD;  Location:  GI     COSMETIC BLEPHAROPLASTY LOWER LIDS BILATERAL Bilateral 5/6/2022    Procedure: BLEPHAROPLASTY, LOWER EYELID, BILATERAL, COSMETIC;  Surgeon: Jemal Sanchez MD;  [place] [facility] [place]     ESOPHAGOSCOPY, GASTROSCOPY, DUODENOSCOPY (EGD), COMBINED N/A 11/23/2016    Procedure: COMBINED ESOPHAGOSCOPY, GASTROSCOPY, DUODENOSCOPY (EGD);  Surgeon: Quinten Feliciano MD;  Location:  GI     EXTERNAL EAR SURGERY      right     EYE SURGERY      radial keratomy     FUSION, SPINE, INTERBODY, OBLIQUE ANTERIOR AND LUMBAR, 2 LEVELS, POST APPROACH, USING OTS N/A 11/18/2021    Procedure: Part 1: Oblique Anterior Interbody Fusion at Lumbar 5 to sacral 1 with use of Bone Morphogenic Protein,;  Surgeon: Serge Ramirez MD;  [facility]: [facility]     GRAFT BONE FROM ILIAC CREST  02/14/2013    Procedure: GRAFT BONE FROM ILIAC CREST;  mac with block and local infilitration;  Surgeon: Av Mendez MD;  [facility]      BREATH HYDROGEN TEST N/A 10/14/2016     Procedure: HYDROGEN BREATH TEST;  Surgeon: Cheri Barron MD;  Location: [facility] GI     HERNIA REPAIR      umbilical age 18 mos.     HYSTERECTOMY TOTAL ABDOMINAL  05/03/2000     MASTECTOMY MODIFIED RADICAL Bilateral     bilateral; right breast prophylactic     OPTICAL TRACKING SYSTEM FUSION POSTERIOR SPINE LUMBAR N/A 11/18/2021    Procedure: Open Posterior Instrumented Spinal Fusion at Lumbar 5 to Sacral 1, with grimm aguayo osteotomy, use of O-Arm/Stealth;  Surgeon: Serge Ramirez MD;  [facility]     PARATHYROIDECTOMY  09/23/2004    R SUPERIOR     PARATHYROIDECTOMY  09/23/2004    parathyroid resection, subtotal     RELEASE CARPAL TUNNEL Right 12/2/2021    Procedure: RIGHT CARPAL TUNNEL RELEASE, RIGHT WRIST HARDWARE REMOVAL, RIGHT CARPAL BOSS EXCISION;  Surgeon: Rolan Conley MD;  [place] [facility] [place]     REMOVE HARDWARE HAND  09/24/2013    Procedure: REMOVE HARDWARE HAND;  Left Hand Screw Removal        RHINOPLASTY  1968     thyr proc skin closed cosmetic manner by subcuticular stitch  01/23/2009     THYROPLASTY  10/09/2009     TONSILLECTOMY  1977     VITRECTOMY PARSPLANA WITH 25 GAUGE SYSTEM Left 6/20/2022    Procedure: LEFT EYE VITRECTOMY, PARS PLANA APPROACH, USING 25-GAUGE INSTRUMENTS, laser;  Surgeon: Felix Carlos MD;  [place] [facility] [place]     WRIST SURGERY      wrist arthrodesis            Social History:     Social History     Tobacco Use     Smoking status: Never     Smokeless tobacco: Never   Substance Use Topics     Alcohol use: Not Currently     Alcohol/week: 7.0 standard drinks     Types: 7 Glasses of wine per week     Comment: Rare to occasional       Living situation: Lives in [place], [place]  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 [employer]           Functional history:       ADLs: Independent  Assistive devices: walker for community ambulation, cane/walker at home  iADLs (medication management and finances): Independent           Family History:      Family History   Problem Relation Age of Onset     Neurologic Disorder Mother         Anuerysm of Cerebral Artery, Dementia     Diabetes Mother      Thyroid Disease Mother         ,     Cerebrovascular Disease Mother      Dementia Mother      Osteoporosis Mother      Heart Disease Father         AAA     Hypertension Father      Circulatory Brother         Perihperal Neurophathy     Diabetes Maternal Grandmother      Asthma Maternal Grandmother      Chronic Obstructive Pulmonary Disease Maternal Grandfather         father     Asthma Maternal Grandfather      Diabetes Maternal Aunt         x2     Melanoma Maternal Aunt      Glaucoma Maternal Aunt      Breast Cancer Cousin      Dementia Other      Cancer Other         malignant melanoma     Hypertension Other      Hypertension Other      Cerebrovascular Disease Other      Cerebrovascular Disease Other      Obesity Other      Respiratory Other      Cancer Other      Diabetes Other      Asthma Other      Macular Degeneration No family hx of      Coronary Artery Disease No family hx of      Hyperlipidemia No family hx of      Kidney Disease No family hx of      Thrombosis No family hx of      Arthritis No family hx of      Depression No family hx of      Mental Illness No family hx of      Substance Abuse No family hx of      Cystic Fibrosis No family hx of      Early Death No family hx of      Coronary Artery Disease Early Onset No family hx of      Heart Failure No family hx of      Bleeding Diathesis No family hx of      Ovarian Cancer No family hx of      Uterine Cancer No family hx of      Prostate Cancer No family hx of      Colorectal Cancer No family hx of      Pancreatic Cancer No family hx of      Lung Cancer No family hx of      Other Cancer No family hx of      Autoimmune Disease No family hx of      Unknown/Adopted No family hx of      Genetic Disorder No family hx of      Bleeding Disorder No family hx of      Clotting Disorder No family hx of       Anesthesia Reaction No family hx of             Medications:     Current Outpatient Medications   Medication Sig Dispense Refill     acetaminophen (TYLENOL) 500 MG tablet Take 500-1,000 mg by mouth every 6 hours as needed for mild pain       amLODIPine (NORVASC) 10 MG tablet Take 1 tablet (10 mg) by mouth daily 90 tablet 3     Ascorbic Acid (VITAMIN C) 500 MG CHEW Take 1 tablet by mouth daily       atorvastatin (LIPITOR) 80 MG tablet Take 1 tablet (80 mg) by mouth daily 90 tablet 3     Cholecalciferol (VITAMIN D3) 50 MCG (2000 UT) CAPS TAKE 3 CAPSULES (6,000 UNITS) BY MOUTH DAILY. 270 capsule 3     CRANBERRY EXTRACT PO Take 650 mg by mouth daily ~10 am  Takes 1       diclofenac (VOLTAREN) 1 % topical gel APPLY 4 GRAMS TO KNEES OR 2 GRAMS TO HANDS FOUR TIMES DAILY USING ENCLOSED DOSING CARD. 100 g 3     gabapentin (NEURONTIN) 300 MG capsule Take 4 capsules (1,200 mg) by mouth 3 times daily 1080 capsule 3     HYDROcodone-acetaminophen (NORCO) 5-325 MG tablet Take 1 tablet by mouth every 6 hours as needed for severe pain (Patient taking differently: Take 1 tablet by mouth every 6 hours as needed for severe pain (7-10) Use as neede , 1-2 pills per week) 30 tablet 0     hydrOXYzine (VISTARIL) 25 MG capsule Take 1 capsule (25 mg) by mouth At Bedtime 90 capsule 3     ibuprofen (ADVIL/MOTRIN) 600 MG tablet Take 1 tablet (600 mg) by mouth every 6 hours as needed for other (mild and/or inflammatory pain) 30 tablet 0     levothyroxine (SYNTHROID/LEVOTHROID) 112 MCG tablet TAKE 1/2 TABLET BY MOUTH DAILY 45 tablet 3     lisinopril (ZESTRIL) 40 MG tablet Take 1 tablet (40 mg) by mouth daily 90 tablet 3     magnesium 250 MG tablet Take 2 tablets by mouth daily       melatonin 5 MG tablet Take 10 mg by mouth At Bedtime        methocarbamol (ROBAXIN) 500 MG tablet Take 1 tablet (500 mg) by mouth 3 times daily (Patient taking differently: Take 500 mg by mouth 3 times daily as needed) 40 tablet 4     metoprolol succinate ER (TOPROL  "XL) 50 MG 24 hr tablet Take 1 tablet (50 mg) by mouth daily 90 tablet 3     Multiple Vitamin (MULTI-VITAMIN) per tablet Take 1 tablet by mouth daily       naloxone (NARCAN) 4 MG/0.1ML nasal spray Spray 1 spray (4 mg) into one nostril alternating nostrils once as needed for opioid reversal every 2-3 minutes until assistance arrives (Patient taking differently: Spray 4 mg into one nostril alternating nostrils once as needed for opioid reversal every 2-3 minutes until assistance arrives  Use only if needed) 0.2 mL 0     ondansetron (ZOFRAN-ODT) 4 MG ODT tab Take 1 tablet (4 mg) by mouth every 12 hours as needed for nausea 180 tablet 3     senna-docusate (SENOKOT-S/PERICOLACE) 8.6-50 MG tablet Take 2 tablets by mouth 2 times daily 30 tablet 0     spironolactone (ALDACTONE) 25 MG tablet Take 2 tablets (50 mg) by mouth daily 180 tablet 3     Vaginal Lubricant (REPHRESH) GEL Place 2 g vaginally every 3 days 2 g 3            Allergies:     Allergies   Allergen Reactions     Erythromycin Nausea     Penicillins Hives     Around age 6 - doesn't recall full reaction, mother told her it was hives.     Has tolerated cephalsporins.               ROS:     A focused ROS is negative other than the symptoms noted above in the HPI.           Physical Examiniation:     VITAL SIGNS: There were no vitals taken for this visit.  BMI: Estimated body mass index is 28.82 kg/m  as calculated from the following:    Height as of 1/4/23: 1.702 m (5' 7\").    Weight as of 1/4/23: 83.5 kg (184 lb).    Gen: NAD, pleasant and cooperative, sitting in wheelchair  HEENT: Normocephalic, atraumatic, extra-ocular movements appear intact  Pulm: non-labored breathing in room air  Ext: mild edema in left leg surrounding fracture site, no tenderness in calves  Neuro/MSK:   Orientation: Oriented to person, place, time, situation. Exhibits good insight into her condition and ongoing management/symptoms.  Motor:  4+/5 with bilateral hip flexion, 5/5 with bilateral " knee extension, 4/5 with left ankle dorsiflexion, EHL, plantar flexion. 4-/5 with right ankle dorsiflexion and EHL, 5/5 with plantar flexion  Sensory: decreased to light touch over dorsal aspect of 1st digits, 4th and 5th digits bilaterally when compared to level of lateral malleolus. Decreased/absent sensation over distal plantar surface of feet 1-2 cm proximal to digit level.           Laboratory/Imaging:     XRay Left Ankle G/E 3 Views (1/11/23):  Impression:  1. Ongoing healing distal fibular fracture.   2. Additional presumed avulsion fracture along the expected area of  flexor retinacular attachment.  3. Redemonstration osteochondral lesion/collapse medial talar dome.    EMG (10/21/21):  Interpretation: The EMG is abnormal and the findings complex.  The findings would be best explained by 1) bilateral active L5-S1 radiculopathies, 2) a generalized, sensorimotor peripheral neuropathy and 3) a severe right median neuropathy at the wrist (carpal tunnel syndrome).         Assessment/Plan:   Nadira Perez is a 72 year old female with history of left stage IIIC inflammatory breast cancer (ER/CO+ve, HER2-ve) who presents [facility] for evaluation and management of peripheral neuropathy symptoms. Several rehabilitation considerations were discussed with Ismael at today's visit. With her polyneuropathy having started pre cancer diagnosis/chemo, her sciatic symptoms, and noted history of ascending symptoms in lower extremities, I think it would be pertinent to repeat her EMG to see if there have been any changes to help delineate what might be contributing to her worsening. In addition, labs have been ordered to rule out other contributing etiology for polyneuropathy. We discussed my recommendation for a right AFO for her right foot/EHL drop, so an orthotics referral was placed today for this. Will plan a 3 month return visit. Patient is in agreement with this plan.    1. Patient education: In depth  discussion and education was provided about the assessment and implications of each of the below recommendations for management. Patient indicated readiness to learn, all questions were answered and understanding of material presented was confirmed.  2. Work-up:  1. Labs- B6, B12, protein immunofixation/elecrophoresis  2. Repeat EMG  3. Therapy/equipment/braces:  1. Start physical therapy as planned  2. Orthotics referral for AFO for right foot drop  4. Medications:  1. Continue gabapentin at current dose.  5. Follow up: 3 months.    Carmen Centeno MD  Physical Medicine & Rehabilitation    I appreciate the opportunity to participate in the care of your patient.     90 minutes spent on the date of the encounter doing chart review, history and exam, documentation and further activities as noted above.

## 2023-01-13 NOTE — NURSING NOTE
"Oncology Rooming Note    January 13, 2023 9:17 AM   Nadira Perez is a 72 year old female who presents for:    Chief Complaint   Patient presents with     Oncology Clinic Visit     Malignant neoplasm of breast in female, estrogen receptor positive, unspecified laterality, unspecified site of breast (H) (Primary Dx); Lymphedema; Peripheral polyneuropathy      Initial Vitals: There were no vitals taken for this visit. Estimated body mass index is 28.82 kg/m  as calculated from the following:    Height as of 1/4/23: 1.702 m (5' 7\").    Weight as of 1/4/23: 83.5 kg (184 lb). There is no height or weight on file to calculate BSA.  Data Unavailable Comment: Data Unavailable   No LMP recorded. Patient has had a hysterectomy.  Allergies reviewed: Yes  Medications reviewed: Yes    Medications: Medication refills not needed today.  Pharmacy name entered into [facility]:    [facility]  [facility] [id number] [place] [address]  [address]    Clinical concerns:      Pt reports having neuropathy. She stated that she has experienced neuropathy before starting chemo, but not nearly as bad as she has recently been experiencing.     Pt also reports have osteoporosis.     Kenny Salamanca            "

## 2023-01-13 NOTE — PATIENT INSTRUCTIONS
Start physical therapy therapy as planned.  An EMG referral was placed, as discussed you can get this done in 4-6 weeks.  Labs were ordered and drawn today, Dr. Centeno will be in touch with the results.  An orthotics referral was placed to be fitted for a right AFO.   Continue gabapentin at the current dosing.  Follow up with Dr. Centeno in 3 months.

## 2023-01-13 NOTE — LETTER
2023         RE: Nadira Perez  [address]  [facility] [address]        Dear Colleague,    Thank you for referring your patient, Nadira Perez, to the [facility]. Please see a copy of my visit note below.           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    Oncology History:  -Diagnosis: 2007. 57 years old. Presented with left-sided breast discomfort which extended into the axilla. Clinical stage IIIC (T4d N3 M0), infiltrating ductal carcinoma, ER/GA positive, HER2-oneida negative. She had skin and areolar complex thickening consistent with inflammatory breast cancer.   -Neoadjuvant chemotherapy: She received FEC (Fluorouracil, Epirubicin, Cyclosphamide) for 3 cycles through 2007. This was followed by Taxotere for 3 cycles completed 10/26/2007. Estimated cumulative epirubicin dosing was 300 mg/m2 (900 mg/m2 is cumulative lifetime dose).   -Surgery: 2007 left-sided mastectomy with axillary lymph node dissection. 1 cm residual carcinoma with DCIS.  lymph nodes were positive, largest was 0.6 cm with extracapsular extension.   -Radiation: She received radiation to the left chest wall at a dose of 6440 cGy, to the left supraclavicular fossa at a dose of 5040 cGy, and to the left internal mammary chain at a dose of 5080 cGy.  Last dose of radiation was administered on 2008.         -Endocrine therapy: Arimidex 2007-2012, Tamoxifen 2012-2017, Arimidex 2017-2021. 14 years of endocrine therapy.   -Genetic testing: no  -Other information: s/p right  sided prophylactic mastectomy 11/20/2007. Zometa every 6 months from 11/2017-11/2021 (4 years).   - Saw Ashley Perry  in Survivorship clinic on 11/15/22.  She fell and broke her 2nd right metatarsal, right fibula, and 4th finger, and thumb. She has chronic low back pain and had back surgery last year. She had a CT of her lumbar spine 5/2022 negative for metastatic disease. Peripheral neuropathy precedes cancer diagnosis, wanted referral to PM&R for evaluation of neuropathy. Seeing Neurology for dizziness, Brain MRI in 2020 negative, continues with lymphedema, wants a referral to PT.      Symptoms,  Patient presents for an initial evaluation today.  She had a lumbar fusion in Dec 21, left lumbar fusion (L5-S1). She complains of paresthesias on the outer aspects of both legs.   She is going to restart PT.   She walks with a walker, and is struggling with balance difficulties.   She has been complaining of numbness, tingling, pain in her legs at least 3 years before that. She did not have low back pain at that time when she noticed the neuropathic symptoms. She had seen Neurology at that time, and was told she has neuritis.   She does not endorse any current low back pain that radiates down either of her lower extremities. More discomfort than pain.   Her fall in December caused the left fibular fracture. She has some edema in that leg.  She has bad osteoporosis and was started on medication by Dr. Clark.  She feels her neuropathic symptoms have progressed distal to proximally over a period, pointing up to mid shin. She also feels like she has sciatic symptoms coming down both legs at the same time.  She is on 1200 mg (TID). She feels that maybe is helping with her neuropathic symptoms.        Therapies/HEP,  Is planning to restart PT following her fibular fracture and for lymphedema.      Functionally,   Modified independent with mobility using walker or cane.             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     Thyroid disease      Past Surgical History:   Procedure Laterality Date     ABDOMEN SURGERY      ovarian cyst, mesh     ARTHRODESIS WRIST Right      ARTHRODESIS WRIST  02/14/2013    Procedure: ARTHRODESIS WRIST;  left wrist scaphoid excision, four bone fusion, iliac crest bone graft  ( Mac with block);  Surgeon: Av Mendez MD;  Location:  OR     BIOPSY      skin, vaginal     BLEPHAROPLASTY BILATERAL Bilateral 5/6/2022    Procedure: UPPER BLEPHAROPLASTY, BILATERAL;  Surgeon: Jemal Sanchez MD;  Location: [facility] [place] OR     CATARACT IOL, RT/LT Right 03/13/2018     CATARACT IOL, RT/LT Left 02/20/2018     COLONOSCOPY  12/24/2013    Procedure: COMBINED COLONOSCOPY, SINGLE BIOPSY/POLYPECTOMY BY BIOPSY;  COLONOSCOPY;  Surgeon: Dom Alvarez MD;  Location:  GI     COSMETIC BLEPHAROPLASTY LOWER LIDS BILATERAL Bilateral 5/6/2022    Procedure: BLEPHAROPLASTY, LOWER EYELID, BILATERAL, COSMETIC;  Surgeon: Jemal Sanchez MD;  Location: [facility] [place]     ESOPHAGOSCOPY, GASTROSCOPY, DUODENOSCOPY (EGD), COMBINED N/A 11/23/2016    Procedure: COMBINED ESOPHAGOSCOPY, GASTROSCOPY, DUODENOSCOPY (EGD);  Surgeon: Quinten Feliciano MD;  Location:  GI     EXTERNAL EAR SURGERY      right     EYE SURGERY      radial keratomy     FUSION, SPINE, INTERBODY, OBLIQUE ANTERIOR AND LUMBAR, 2 LEVELS, POST APPROACH, USING OTS N/A 11/18/2021    Procedure: Part 1: Oblique Anterior Interbody Fusion at Lumbar 5 to sacral 1 with use of Bone Morphogenic Protein,;  Surgeon: Serge Ramirez  MD Juan;  Location: [facility] OR     GRAFT BONE FROM ILIAC CREST  02/14/2013    Procedure: GRAFT BONE FROM ILIAC CREST;  mac with block and local infilitration;  Surgeon: Av Mendez MD;  Location: [facility] OR     HC BREATH HYDROGEN TEST N/A 10/14/2016    Procedure: HYDROGEN BREATH TEST;  Surgeon: Cheri Barron MD;  Location: [facility] GI     HERNIA REPAIR      umbilical age 18 mos.     HYSTERECTOMY TOTAL ABDOMINAL  05/03/2000     MASTECTOMY MODIFIED RADICAL Bilateral     bilateral; right breast prophylactic     OPTICAL TRACKING SYSTEM FUSION POSTERIOR SPINE LUMBAR N/A 11/18/2021    Procedure: Open Posterior Instrumented Spinal Fusion at Lumbar 5 to Sacral 1, with grimm aguayo osteotomy, use of O-Arm/Stealth;  Surgeon: Serge Ramirez MD;  Location: [facility] OR     PARATHYROIDECTOMY  09/23/2004    R SUPERIOR     PARATHYROIDECTOMY  09/23/2004    parathyroid resection, subtotal     RELEASE CARPAL TUNNEL Right 12/2/2021    Procedure: RIGHT CARPAL TUNNEL RELEASE, RIGHT WRIST HARDWARE REMOVAL, RIGHT CARPAL BOSS EXCISION;  Surgeon: Rolan Conley MD;  Location: [facility] OR     REMOVE HARDWARE HAND  09/24/2013    Procedure: REMOVE HARDWARE HAND;  Left Hand Screw Removal        RHINOPLASTY  1968     thyr proc skin closed cosmetic manner by subcuticular stitch  01/23/2009     THYROPLASTY  10/09/2009     TONSILLECTOMY  1977     VITRECTOMY PARSPLANA WITH 25 GAUGE SYSTEM Left 6/20/2022    Procedure: LEFT EYE VITRECTOMY, PARS PLANA APPROACH, USING 25-GAUGE INSTRUMENTS, laser;  Surgeon: Felix Carlos MD;  Location: [facility] OR     WRIST SURGERY      wrist arthrodesis            Social History:     Social History     Tobacco Use     Smoking status: Never     Smokeless tobacco: Never   Substance Use Topics     Alcohol use: Not Currently     Alcohol/week: 7.0 standard drinks     Types: 7 Glasses of wine per week     Comment: Rare to occasional       Living situation: Lives in [place], [place]  Vocational  History: Former ICU nurse in leadership at [employer]           Functional history:       ADLs: Independent  Assistive devices: walker for community ambulation, cane/walker at home  iADLs (medication management and finances): Independent           Family History:     Family History   Problem Relation Age of Onset     Neurologic Disorder Mother         Anuerysm of Cerebral Artery, Dementia     Diabetes Mother      Thyroid Disease Mother         ,     Cerebrovascular Disease Mother      Dementia Mother      Osteoporosis Mother      Heart Disease Father         AAA     Hypertension Father      Circulatory Brother         Perihperal Neurophathy     Diabetes Maternal Grandmother      Asthma Maternal Grandmother      Chronic Obstructive Pulmonary Disease Maternal Grandfather         father     Asthma Maternal Grandfather      Diabetes Maternal Aunt         x2     Melanoma Maternal Aunt      Glaucoma Maternal Aunt      Breast Cancer Cousin      Dementia Other      Cancer Other         malignant melanoma     Hypertension Other      Hypertension Other      Cerebrovascular Disease Other      Cerebrovascular Disease Other      Obesity Other      Respiratory Other      Cancer Other      Diabetes Other      Asthma Other      Macular Degeneration No family hx of      Coronary Artery Disease No family hx of      Hyperlipidemia No family hx of      Kidney Disease No family hx of      Thrombosis No family hx of      Arthritis No family hx of      Depression No family hx of      Mental Illness No family hx of      Substance Abuse No family hx of      Cystic Fibrosis No family hx of      Early Death No family hx of      Coronary Artery Disease Early Onset No family hx of      Heart Failure No family hx of      Bleeding Diathesis No family hx of      Ovarian Cancer No family hx of      Uterine Cancer No family hx of      Prostate Cancer No family hx of      Colorectal Cancer No family hx of      Pancreatic Cancer No family hx of      Lung  Cancer No family hx of      Other Cancer No family hx of      Autoimmune Disease No family hx of      Unknown/Adopted No family hx of      Genetic Disorder No family hx of      Bleeding Disorder No family hx of      Clotting Disorder No family hx of      Anesthesia Reaction No family hx of             Medications:     Current Outpatient Medications   Medication Sig Dispense Refill     acetaminophen (TYLENOL) 500 MG tablet Take 500-1,000 mg by mouth every 6 hours as needed for mild pain       amLODIPine (NORVASC) 10 MG tablet Take 1 tablet (10 mg) by mouth daily 90 tablet 3     Ascorbic Acid (VITAMIN C) 500 MG CHEW Take 1 tablet by mouth daily       atorvastatin (LIPITOR) 80 MG tablet Take 1 tablet (80 mg) by mouth daily 90 tablet 3     Cholecalciferol (VITAMIN D3) 50 MCG (2000 UT) CAPS TAKE 3 CAPSULES (6,000 UNITS) BY MOUTH DAILY. 270 capsule 3     CRANBERRY EXTRACT PO Take 650 mg by mouth daily ~10 am  Takes 1       diclofenac (VOLTAREN) 1 % topical gel APPLY 4 GRAMS TO KNEES OR 2 GRAMS TO HANDS FOUR TIMES DAILY USING ENCLOSED DOSING CARD. 100 g 3     gabapentin (NEURONTIN) 300 MG capsule Take 4 capsules (1,200 mg) by mouth 3 times daily 1080 capsule 3     HYDROcodone-acetaminophen (NORCO) 5-325 MG tablet Take 1 tablet by mouth every 6 hours as needed for severe pain (Patient taking differently: Take 1 tablet by mouth every 6 hours as needed for severe pain (7-10) Use as neede , 1-2 pills per week) 30 tablet 0     hydrOXYzine (VISTARIL) 25 MG capsule Take 1 capsule (25 mg) by mouth At Bedtime 90 capsule 3     ibuprofen (ADVIL/MOTRIN) 600 MG tablet Take 1 tablet (600 mg) by mouth every 6 hours as needed for other (mild and/or inflammatory pain) 30 tablet 0     levothyroxine (SYNTHROID/LEVOTHROID) 112 MCG tablet TAKE 1/2 TABLET BY MOUTH DAILY 45 tablet 3     lisinopril (ZESTRIL) 40 MG tablet Take 1 tablet (40 mg) by mouth daily 90 tablet 3     magnesium 250 MG tablet Take 2 tablets by mouth daily       melatonin 5 MG  "tablet Take 10 mg by mouth At Bedtime        methocarbamol (ROBAXIN) 500 MG tablet Take 1 tablet (500 mg) by mouth 3 times daily (Patient taking differently: Take 500 mg by mouth 3 times daily as needed) 40 tablet 4     metoprolol succinate ER (TOPROL XL) 50 MG 24 hr tablet Take 1 tablet (50 mg) by mouth daily 90 tablet 3     Multiple Vitamin (MULTI-VITAMIN) per tablet Take 1 tablet by mouth daily       naloxone (NARCAN) 4 MG/0.1ML nasal spray Spray 1 spray (4 mg) into one nostril alternating nostrils once as needed for opioid reversal every 2-3 minutes until assistance arrives (Patient taking differently: Spray 4 mg into one nostril alternating nostrils once as needed for opioid reversal every 2-3 minutes until assistance arrives  Use only if needed) 0.2 mL 0     ondansetron (ZOFRAN-ODT) 4 MG ODT tab Take 1 tablet (4 mg) by mouth every 12 hours as needed for nausea 180 tablet 3     senna-docusate (SENOKOT-S/PERICOLACE) 8.6-50 MG tablet Take 2 tablets by mouth 2 times daily 30 tablet 0     spironolactone (ALDACTONE) 25 MG tablet Take 2 tablets (50 mg) by mouth daily 180 tablet 3     Vaginal Lubricant (REPHRESH) GEL Place 2 g vaginally every 3 days 2 g 3            Allergies:     Allergies   Allergen Reactions     Erythromycin Nausea     Penicillins Hives     Around age 6 - doesn't recall full reaction, mother told her it was hives.     Has tolerated cephalsporins.               ROS:     A focused ROS is negative other than the symptoms noted above in the HPI.           Physical Examiniation:     VITAL SIGNS: There were no vitals taken for this visit.  BMI: Estimated body mass index is 28.82 kg/m  as calculated from the following:    Height as of 1/4/23: 1.702 m (5' 7\").    Weight as of 1/4/23: 83.5 kg (184 lb).    Gen: NAD, pleasant and cooperative, sitting in wheelchair  HEENT: Normocephalic, atraumatic, extra-ocular movements appear intact  Pulm: non-labored breathing in room air  Ext: mild edema in left leg " surrounding fracture site, no tenderness in calves  Neuro/MSK:   Orientation: Oriented to person, place, time, situation. Exhibits good insight into her condition and ongoing management/symptoms.  Motor:  4+/5 with bilateral hip flexion, 5/5 with bilateral knee extension, 4/5 with left ankle dorsiflexion, EHL, plantar flexion. 4-/5 with right ankle dorsiflexion and EHL, 5/5 with plantar flexion  Sensory: decreased to light touch over dorsal aspect of 1st digits, 4th and 5th digits bilaterally when compared to level of lateral malleolus. Decreased/absent sensation over distal plantar surface of feet 1-2 cm proximal to digit level.           Laboratory/Imaging:     XRay Left Ankle G/E 3 Views (1/11/23):  Impression:  1. Ongoing healing distal fibular fracture.   2. Additional presumed avulsion fracture along the expected area of  flexor retinacular attachment.  3. Redemonstration osteochondral lesion/collapse medial talar dome.    EMG (10/21/21):  Interpretation: The EMG is abnormal and the findings complex.  The findings would be best explained by 1) bilateral active L5-S1 radiculopathies, 2) a generalized, sensorimotor peripheral neuropathy and 3) a severe right median neuropathy at the wrist (carpal tunnel syndrome).         Assessment/Plan:   Nadira Perez is a 72 year old female with history of left stage IIIC inflammatory breast cancer (ER/TN+ve, HER2-ve) who presents to [facility] for evaluation and management of peripheral neuropathy symptoms. Several rehabilitation considerations were discussed with Ismael at today's visit. With her polyneuropathy having started pre cancer diagnosis/chemo, her sciatic symptoms, and noted history of ascending symptoms in lower extremities, I think it would be pertinent to repeat her EMG to see if there have been any changes to help delineate what might be contributing to her worsening. In addition, labs have been ordered to rule out other contributing  etiology for polyneuropathy. We discussed my recommendation for a right AFO for her right foot/EHL drop, so an orthotics referral was placed today for this. Will plan a 3 month return visit. Patient is in agreement with this plan.    1. Patient education: In depth discussion and education was provided about the assessment and implications of each of the below recommendations for management. Patient indicated readiness to learn, all questions were answered and understanding of material presented was confirmed.  2. Work-up:  1. Labs- B6, B12, protein immunofixation/elecrophoresis  2. Repeat EMG  3. Therapy/equipment/braces:  1. Start physical therapy as planned  2. Orthotics referral for AFO for right foot drop  4. Medications:  1. Continue gabapentin at current dose.  5. Follow up: 3 months.    Carmen Centeno MD  Physical Medicine & Rehabilitation    I appreciate the opportunity to participate in the care of your patient.     90 minutes spent on the date of the encounter doing chart review, history and exam, documentation and further activities as noted above.

## 2023-01-16 LAB
ALBUMIN SERPL ELPH-MCNC: 4.6 G/DL (ref 3.7–5.1)
ALPHA1 GLOB SERPL ELPH-MCNC: 0.3 G/DL (ref 0.2–0.4)
ALPHA2 GLOB SERPL ELPH-MCNC: 0.7 G/DL (ref 0.5–0.9)
B-GLOBULIN SERPL ELPH-MCNC: 0.8 G/DL (ref 0.6–1)
GAMMA GLOB SERPL ELPH-MCNC: 0.7 G/DL (ref 0.7–1.6)
M PROTEIN SERPL ELPH-MCNC: 0 G/DL
PROT PATTERN SERPL ELPH-IMP: NORMAL
PROT PATTERN SERPL IFE-IMP: NORMAL

## 2023-01-16 PROCEDURE — 86334 IMMUNOFIX E-PHORESIS SERUM: CPT | Mod: 26

## 2023-01-16 PROCEDURE — 84165 PROTEIN E-PHORESIS SERUM: CPT | Mod: 26

## 2023-01-17 NOTE — NURSING NOTE
Chief Complaint   Patient presents with     Blood Draw     labs drawn with piv start by rn.  vs taken     Labs drawn with PIV start by rn.  Pt tolerated well.  VS taken and pt checked in for next appt.  Ruby Zamora RN     Referral placed in  filing cabinet under January

## 2023-01-18 ENCOUNTER — OFFICE VISIT (OUTPATIENT)
Dept: PALLIATIVE MEDICINE | Facility: OTHER | Age: 73
End: 2023-01-18
Payer: COMMERCIAL

## 2023-01-18 ENCOUNTER — MYC MEDICAL ADVICE (OUTPATIENT)
Dept: ORTHOPEDICS | Facility: CLINIC | Age: 73
End: 2023-01-18

## 2023-01-18 DIAGNOSIS — S82.65XA CLOSED NONDISPLACED FRACTURE OF LATERAL MALLEOLUS OF LEFT FIBULA, INITIAL ENCOUNTER: Primary | ICD-10-CM

## 2023-01-18 DIAGNOSIS — Z98.1 S/P SPINAL FUSION: Primary | ICD-10-CM

## 2023-01-18 DIAGNOSIS — M54.50 CHRONIC BILATERAL LOW BACK PAIN, UNSPECIFIED WHETHER SCIATICA PRESENT: ICD-10-CM

## 2023-01-18 DIAGNOSIS — G89.29 CHRONIC BILATERAL LOW BACK PAIN, UNSPECIFIED WHETHER SCIATICA PRESENT: ICD-10-CM

## 2023-01-18 DIAGNOSIS — M54.59 OTHER LOW BACK PAIN: ICD-10-CM

## 2023-01-18 LAB — PYRIDOXAL PHOS SERPL-SCNC: 31 NMOL/L

## 2023-01-18 PROCEDURE — 97810 ACUP 1/> WO ESTIM 1ST 15 MIN: CPT | Performed by: ACUPUNCTURIST

## 2023-01-18 PROCEDURE — 97811 ACUP 1/> W/O ESTIM EA ADD 15: CPT | Performed by: ACUPUNCTURIST

## 2023-01-18 ASSESSMENT — PAIN SCALES - PAIN ENJOYMENT GENERAL ACTIVITY SCALE (PEG)
INTERFERED_ENJOYMENT_LIFE: 3
AVG_PAIN_PASTWEEK: 4
AVG_PAIN_PASTWEEK: 4
INTERFERED_GENERAL_ACTIVITY: 4
PEG_TOTALSCORE: 3.67
PEG_TOTALSCORE: 3.67
INTERFERED_GENERAL_ACTIVITY: 4
INTERFERED_ENJOYMENT_LIFE: 3

## 2023-01-18 NOTE — PROGRESS NOTES
ACUPUNCTURIST TREATMENT NOTE      Nadira Perez, a 72 year old female, is here today for Follow - Up exam. Patient is referred by Anyiwe.    HPI  Main Complaint: Chronic low back pain  Secondary Complaints: Occasional pain of (L) wrist and LLE, none at time of treatment.    Past Medical History  Past Medical History Reviewed: No   has a past medical history of Arthritis, Bone disease, Breast cancer (H), H/O kyphoplasty, Hearing problem, History of kidney stones, History of radiation therapy, Hyperlipemia, Hypertension, Hypopotassemia, Kidney problem, Lymph edema, Medullary sponge kidney, Osteopenia, PONV (postoperative nausea and vomiting), Reduced vision, Squamous cell skin cancer, and Thyroid disease.    Objective  Basic Exam Completed:   Constitutional: Well Groomed and Normal Weight and Normal Appearance    TCM Exam Completed: Yes   Limbs/Back: Lower Back    Tongue/Pulse Exam Completed: No    Patient Assessment  Patient Type: Pain  Patient Complaint: Chronic low back pain    Acupuncture 1/12/2023 1/18/2023   Intervention Reason Pain Pain   Pain Location low back low back   Pre-session Pain Rating 4 4   Post-session Pain Rating 1 3   Pain 2 Location - -   Pre-session Pain 2 Rating - -   Post-session Pain 2 Rating - -   Pain 3 Location - -   Pre-session Pain 3 Rating - -   Post-session Pain 3 Rating - -       TCM Diagnosis: Qi Stagnation;Blood Stagnation;Kidney Qi Deficiency      Treatment Principle: channel obstruction of Sreekanth Colon and Billy Colon; heart fire    TCM / Acupuncture Treatment  Acupuncture Points:       Initial insertions: Raj deysi, Bl 31, 32, Mariann @ L4, 5, Gb 30       Second insertions: (L) lateral three passes, (L) Li 5, (L) Tb 4, (L) Si 4            Number of needles inserted: 18  Number of needles removed: 18    Accessory Techniques 1/12/2023 1/18/2023   Accessory Techniques TDP Heat Lamp TDP Heat Lamp   TDP Heat Lamp location used low back low back   [place] location used low back -    Topicals Po Sum On -   Topicals location used low back -     Oswestry Disability Index (JOHN   Nish Weeks 1980, All rights reserved) 8/8/2022 11/3/2022   Section 1 - Pain intensity The pain is moderate at the moment. The pain is moderate at the moment.   Section 2 - Personal care (washing, dressing, etc.)  I need some help but manage most of my personal care. I can look after myself normally but it is very painful.   Section 3 - Lifting I can only lift very light weights. I can only lift very light weights.   Section 4 - Walking I can only walk using a cane or crutches. Pain prevents me from walking more than a quarter of a mile.   Section 5 - Sitting Pain prevents me from sitting for more than half an hour. Pain prevents me from sitting for more than 1 hour.   Section 6 - Standing Pain prevents me from standing for more than 10 minutes. Pain prevents me from standing for more than half an hour.   Section 7 - Sleeping Because of pain I have less than 4 hours sleep. Because of pain I have less than 6 hours sleep.   Section 8 - Sex life (if applicable) Not answered/NA Not answered/NA   Section 9 - Social life Pain has restricted my social life and I do not go out as often. Pain has restricted my social life and I do not go out as often.   Section 10 - Traveling I can travel anywhere without pain. I can travel anywhere without pain.   Oswestry Score (%) 57.78 42.22       Assessment and Plan  Treatment Observations:    Acupuncture Treatment Recommendations:         It is my recommendation that this patient seek advice from their Primary Care Provider about active symptoms not addressed during this visit. The risks and benefits of acupuncture were reviewed and the patient stated understanding. The patient's questions were answered to their satisfaction. Consent was provided for treatment. We thank you for the referral and opportunity to treat this patient.    Time Spent with Patient:   I spent a total of 30 minutes  face-to-face with Nadira Perez during today's office visit.     Ponce Trejo

## 2023-01-23 NOTE — PROGRESS NOTES
ACUPUNCTURIST TREATMENT NOTE      Nadira Perez, a 72 year old female, is here today for Follow - Up exam. Patient is referred by Anyiwe.    HPI  Main Complaint: Chronic low back pain: Patient states over all pain is better than before starting acupuncture. Feels lower pain levels when she is not walking with boot on LLE. Generally when she has boot off and is walking normally pain is 2-3/10. Pain gets higher when walking with boot.        Past Medical History  Past Medical History Reviewed: Yes   has a past medical history of Arthritis, Bone disease, Breast cancer (H), H/O kyphoplasty, Hearing problem, History of kidney stones, History of radiation therapy, Hyperlipemia, Hypertension, Hypopotassemia, Kidney problem, Lymph edema, Medullary sponge kidney, Osteopenia, PONV (postoperative nausea and vomiting), Reduced vision, Squamous cell skin cancer, and Thyroid disease.    Objective  Basic Exam Completed:   No    TCM Exam Completed: Yes   Limbs/Back: Lower Back    Tongue/Pulse Exam Completed: No    Patient Assessment    PEG: A Three-Item Scale Assessing Pain Intensity and Interference    What number best describes your PAIN ON AVERAGE in the past week? 4    What number best describes how, during the past week, pain has interfered with your ENJOYMENT OF LIFE? 3    What number best describes how, during the past week, pain has interfered with your GENERAL ACTIVITY? 4    PEG Total Score: 3.67    Marin CROWE, Rocky KA, Maxwell SCHMITT, Eden COHEN, Jarvis J, Ken LUBIN, Pooja SM, Jewel K. Development and initial validation of the PEG, a 3-item scale assessing pain intensity and interference. Journal of General Internal Medicine. 2009 Jun;24:733-738.    Patient Type: Pain  Patient Complaint: Chronic low back pain    Acupuncture 1/18/2023 1/24/2023   Intervention Reason Pain Pain   Pain Location low back low back   Pre-session Pain Rating 4 5   Post-session Pain Rating 3 3   Pain 2 Location - -   Pre-session Pain 2 Rating - -    Post-session Pain 2 Rating - -   Pain 3 Location - -   Pre-session Pain 3 Rating - -   Post-session Pain 3 Rating - -       TCM Diagnosis: Qi Stagnation;Blood Stagnation;Kidney Qi Deficiency      Treatment Principle: channel obstruction of Sreekanth Colon and Billy Colon; heart fire    TCM / Acupuncture Treatment  Acupuncture Points:       Initial insertions: HTJJ L4-L5, Ub25-28, Ub31-32, Yaoyan, Gb29       Second insertions: Baihui, Si6, Tb5, Gb34, Ub57. Right: Ub60, Ub62                    Accessory Techniques 1/18/2023 1/24/2023   Accessory Techniques TDP Heat Lamp TDP Heat Lamp   TDP Heat Lamp location used low back low back   Tuina location used - -   Topicals - -   Topicals location used - -     Oswestry Disability Index (JOHN   Nish Weeks 1980, All rights reserved) 8/8/2022 11/3/2022   Section 1 - Pain intensity The pain is moderate at the moment. The pain is moderate at the moment.   Section 2 - Personal care (washing, dressing, etc.)  I need some help but manage most of my personal care. I can look after myself normally but it is very painful.   Section 3 - Lifting I can only lift very light weights. I can only lift very light weights.   Section 4 - Walking I can only walk using a cane or crutches. Pain prevents me from walking more than a quarter of a mile.   Section 5 - Sitting Pain prevents me from sitting for more than half an hour. Pain prevents me from sitting for more than 1 hour.   Section 6 - Standing Pain prevents me from standing for more than 10 minutes. Pain prevents me from standing for more than half an hour.   Section 7 - Sleeping Because of pain I have less than 4 hours sleep. Because of pain I have less than 6 hours sleep.   Section 8 - Sex life (if applicable) Not answered/NA Not answered/NA   Section 9 - Social life Pain has restricted my social life and I do not go out as often. Pain has restricted my social life and I do not go out as often.   Section 10 - Traveling I can travel anywhere  without pain. I can travel anywhere without pain.   Oswestry Score (%) 57.78 42.22       Assessment and Plan  Treatment Observations: Patient relaxed well during treatment  Acupuncture Treatment Recommendations: Diagnoses for treatment: Z98.1 (ICD-10-CM) - S/P spinal fusion  M54.50, G89.29 (ICD-10-CM) - Chronic low back pain, Other low back pain.      This service is performed and billed incident to Dr. Kamran Kelly.    It is my recommendation that this patient seek advice from their Primary Care Provider about active symptoms not addressed during this visit. The risks and benefits of acupuncture were reviewed and the patient stated understanding. The patient's questions were answered to their satisfaction. Consent was provided for treatment. We thank you for the referral and opportunity to treat this patient.    Time Spent with Patient:   I spent a total of 30 minutes face-to-face with Nadira Perez during today's office visit.     Zara Rosa L.Ac.  01/24/23

## 2023-01-24 ENCOUNTER — TELEPHONE (OUTPATIENT)
Dept: PALLIATIVE MEDICINE | Facility: OTHER | Age: 73
End: 2023-01-24

## 2023-01-24 ENCOUNTER — OFFICE VISIT (OUTPATIENT)
Dept: PALLIATIVE MEDICINE | Facility: OTHER | Age: 73
End: 2023-01-24
Payer: COMMERCIAL

## 2023-01-24 DIAGNOSIS — G89.29 CHRONIC BILATERAL LOW BACK PAIN, UNSPECIFIED WHETHER SCIATICA PRESENT: ICD-10-CM

## 2023-01-24 DIAGNOSIS — Z98.1 S/P SPINAL FUSION: Primary | ICD-10-CM

## 2023-01-24 DIAGNOSIS — M54.50 CHRONIC BILATERAL LOW BACK PAIN, UNSPECIFIED WHETHER SCIATICA PRESENT: ICD-10-CM

## 2023-01-24 DIAGNOSIS — M54.59 OTHER LOW BACK PAIN: ICD-10-CM

## 2023-01-24 PROCEDURE — 97810 ACUP 1/> WO ESTIM 1ST 15 MIN: CPT | Performed by: ACUPUNCTURIST

## 2023-01-24 PROCEDURE — 97811 ACUP 1/> W/O ESTIM EA ADD 15: CPT | Performed by: ACUPUNCTURIST

## 2023-01-24 NOTE — TELEPHONE ENCOUNTER
Health Call Center    Phone Message    May a detailed message be left on voicemail: yes     Reason for Call: Pt said that she left 2 papers on Zara's desk when she left today.  She is requesting that Zara email these to her at [email or web address]

## 2023-01-26 ENCOUNTER — HOSPITAL ENCOUNTER (OUTPATIENT)
Dept: PHYSICAL THERAPY | Facility: CLINIC | Age: 73
Discharge: HOME | End: 2023-01-26
Payer: COMMERCIAL

## 2023-01-26 DIAGNOSIS — R53.1 DECREASED STRENGTH: ICD-10-CM

## 2023-01-26 DIAGNOSIS — L90.5 SCAR CONDITION AND FIBROSIS OF SKIN: ICD-10-CM

## 2023-01-26 DIAGNOSIS — Z91.81 H/O FALL: ICD-10-CM

## 2023-01-26 DIAGNOSIS — R53.81 PHYSICAL DECONDITIONING: ICD-10-CM

## 2023-01-26 DIAGNOSIS — R26.89 IMPAIRMENT OF BALANCE: ICD-10-CM

## 2023-01-26 DIAGNOSIS — I89.0 LYMPHEDEMA OF LEFT UPPER EXTREMITY: Primary | ICD-10-CM

## 2023-01-26 PROCEDURE — 97140 MANUAL THERAPY 1/> REGIONS: CPT | Mod: GP | Performed by: PHYSICAL THERAPIST

## 2023-01-26 PROCEDURE — 97112 NEUROMUSCULAR REEDUCATION: CPT | Mod: GP | Performed by: PHYSICAL THERAPIST

## 2023-01-27 ENCOUNTER — THERAPY VISIT (OUTPATIENT)
Dept: PHYSICAL THERAPY | Facility: CLINIC | Age: 73
End: 2023-01-27
Attending: FAMILY MEDICINE
Payer: COMMERCIAL

## 2023-01-27 DIAGNOSIS — M25.572 ACUTE LEFT ANKLE PAIN: ICD-10-CM

## 2023-01-27 DIAGNOSIS — S82.65XA CLOSED NONDISPLACED FRACTURE OF LATERAL MALLEOLUS OF LEFT FIBULA, INITIAL ENCOUNTER: ICD-10-CM

## 2023-01-27 PROCEDURE — 97161 PT EVAL LOW COMPLEX 20 MIN: CPT | Mod: GP | Performed by: PHYSICAL THERAPIST

## 2023-01-27 PROCEDURE — 97110 THERAPEUTIC EXERCISES: CPT | Mod: GP | Performed by: PHYSICAL THERAPIST

## 2023-01-27 PROCEDURE — 97530 THERAPEUTIC ACTIVITIES: CPT | Mod: GP | Performed by: PHYSICAL THERAPIST

## 2023-01-27 NOTE — PROGRESS NOTES
[facility]    OUTPATIENT Physical Therapy ORTHOPEDIC EVALUATION  PLAN OF TREATMENT FOR OUTPATIENT REHABILITATION  (COMPLETE FOR INITIAL CLAIMS ONLY)  Patient's Last Name, First Name, M.I.  YOB: 1950  Nadira Perez    Provider s Name:  [facility]   Medical Record No.  [id number]   Start of Care Date:  01/27/23   Onset Date:   12/01/22   Treatment Diagnosis:  S/P L fib fx Medical Diagnosis:     Closed nondisplaced fracture of lateral malleolus of left fibula, initial encounter  Acute left ankle pain       Goals:     01/27/23 0500   Body Part   Goals listed below are for S/P L fib fx   Goal #1   Goal #1 ambulation   Previous Functional Level No restrictions   Current Functional Level Minutes patient can walk   Performance Level 10 in home setting use SEC or furniture   STG Target Performance Minutes patient will be able to walk   Performance Level 15 no AD home setting   Rationale for safe household ambulation;for safe community ambulation;to maintain proper body mechanics/posture while ambulating to avoid additional compensatory injury due to improper gait mechanics;to promote a healthy and active lifestyle   Due Date 02/10/23    LTG Target Performance Minutes patient will be able to  walk;without assistive device   Performance Level 30+ all surfaces pain free   Rationale for safe household ambulation;for safe outdoor household ambulation;for safe community ambulation;to maintain proper body mechanics/posture while ambulating to avoid additional compensatory injury due to improper gait mechanics;to promote a healthy and active lifestyle   Due Date 03/24/23         Therapy Frequency:  1x/week  Predicted Duration of Therapy Intervention:  8 weeks    Chaitanya Villasenor PT                 I CERTIFY THE NEED FOR THESE SERVICES FURNISHED UNDER        THIS PLAN OF  TREATMENT AND WHILE UNDER MY CARE     (Physician attestation of this document indicates review and certification of the therapy plan).                     Certification Date From:  01/27/23   Certification Date To:  03/27/23    Referring Provider:  Cortney Shearer*    Initial Assessment        See Epic Evaluation SOC Date: 01/27/23

## 2023-01-27 NOTE — PROGRESS NOTES
Physical Therapy Initial Evaluation  Subjective:  The history is provided by the patient. No  was used.   Patient Health History  Nadira Perez being seen for PT eval for fractured left fibula.     Problem began: 12/1/2022.   Problem occurred: Fell on ice   Pain is reported as 1/10 on pain scale.  General health as reported by patient is fair.  Pertinent medical history includes: anemia, changes in bowel/bladder, calf pain-swelling-warmth, cancer, concussions/dizziness, fibromyalgia, history of fractures, heart problems, hepatitis, high blood pressure, numbness/tingling, overweight, osteoarthritis, osteoporosis, pain at night/rest, thyroid problems, weakness and other. Other medical history details: Post-menopausal.     Medical allergies: none.   Surgeries include:  Orthopedic surgery and cancer surgery. Other surgery history details: See chart.    Current medications:  Bone density, high blood pressure medication, muscle relaxants, pain medication, sleep medication, thyroid medication and other. Other medications details: See chart.    Current occupation is Nurse.   Primary job tasks include:  Computer work and driving.                  Therapist Generated HPI Evaluation  Problem details: Pt c/o L ankle pain S/P fall 12/1/2022.  X-rays showed L lateral malleolus fx and was placed in CAM WBAT.  Now out of CAM in home setting and in CAM in public using SEC.  MD order date 1/19/2023. .         Type of problem:  Left ankle.    This is a new condition.  Condition occurred with:  A fall/slip.  Where condition occurred: at home.  Patient reports pain:  Lateral.  Pain is described as aching and is intermittent.  Pain is the same all the time.  Since onset symptoms are gradually improving.  Associated symptoms:  Loss of motion/stiffness and edema. Symptoms are exacerbated by ascending stairs, bending/squatting, transfers, standing, descending stairs and walking  and relieved by rest.      Barriers  include:  None as reported by patient.                        Objective:    Gait:    Gait Type:  Antalgic   Assistive Devices:  Cane  Deviations:  Ankle:  Push off decr LGeneral Deviations:  Stride length decr and stance time decr          Ankle/Foot Evaluation  ROM:    AROM:    Dorsiflexion: Left:   3  Right:    Plantarflexion: Left:  55    Right:   Inversion: Left:  10     Right:   Eversion: 5     Right:       PROM:    Dorsiflexion: Left:    8     Right:                   Strength:    Dorsiflexion:  Left: 4/5      Pain:-/+     Plantarflexion: Left: 4/5    Pain:-/+     Inversion:Left: 4-/5   Pain:-/+       Eversion:Left: 4- and 3+/5   Pain:+                        PALPATION:   Left ankle tenderness present at:  lateral malleolus    EDEMA: Edema ankle: moderate L lower leg swelling generally.          MOBILITY TESTING:       Talocrural Left: hypomobile            FUNCTIONAL TESTS:           Proprioception:  Stork Balance Test: Left: 2-3 sec                                                      General     ROS    Assessment/Plan:    Patient is a 72 year old female with left side ankle complaints.    Patient has the following significant findings with corresponding treatment plan.                Diagnosis 1: S/P L lateral malleolus fx  Pain -  hot/cold therapy, manual therapy and home program  Decreased ROM/flexibility - manual therapy and therapeutic exercise  Decreased strength - therapeutic exercise and therapeutic activities  Decreased proprioception - neuro re-education and therapeutic activities  Edema - self management/home program  Impaired gait - gait training  Impaired muscle performance - neuro re-education  Decreased function - therapeutic activities    Therapy Evaluation Codes:   Cumulative Therapy Evaluation is: Low complexity.    Previous and current functional limitations:  (See Goal Flow Sheet for this information)    Short term and Long term goals: (See Goal Flow Sheet for this information)      Communication ability:  Patient appears to be able to clearly communicate and understand verbal and written communication and follow directions correctly.  Treatment Explanation - The following has been discussed with the patient:   RX ordered/plan of care  Anticipated outcomes  Possible risks and side effects  This patient would benefit from PT intervention to resume normal activities.   Rehab potential is good.    Frequency:  1 X week, once daily  Duration:  for 8 weeks  Discharge Plan:  Achieve all LTG.  Independent in home treatment program.  Reach maximal therapeutic benefit.    Please refer to the daily flowsheet for treatment today, total treatment time and time spent performing 1:1 timed codes.

## 2023-01-27 NOTE — PROGRESS NOTES
[facility]    OUTPATIENT {PT.OT:[id number]} ORTHOPEDIC EVALUATION  PLAN OF TREATMENT FOR OUTPATIENT REHABILITATION  (COMPLETE FOR INITIAL CLAIMS ONLY)  Patient's Last Name, First Name, M.I.  YOB: 1950  Nadira Perez    Provider s Name:  [facility]   Medical Record No.  [id number]   Start of Care Date:  01/27/23   Onset Date:   12/01/22   Treatment Diagnosis:  S/P L fib fx Medical Diagnosis:     Closed nondisplaced fracture of lateral malleolus of left fibula, initial encounter  Acute left ankle pain       Goals:  ***    Therapy Frequency:  1x/week  Predicted Duration of Therapy Intervention:  8 weeks    Chaitanya Villasenor, PT               {Rehab Co-Sign/Paper:[id number]}           Certification Date From:  01/27/23   Certification Date To:  03/27/23    Referring Provider:  Cortney Shearer*    Initial Assessment        See Epic Evaluation SOC Date: 01/27/23

## 2023-01-30 ENCOUNTER — HOSPITAL ENCOUNTER (OUTPATIENT)
Dept: PHYSICAL THERAPY | Facility: CLINIC | Age: 73
Discharge: HOME | End: 2023-01-30
Payer: COMMERCIAL

## 2023-01-30 DIAGNOSIS — L90.5 SCAR CONDITION AND FIBROSIS OF SKIN: ICD-10-CM

## 2023-01-30 DIAGNOSIS — Z91.81 H/O FALL: ICD-10-CM

## 2023-01-30 DIAGNOSIS — I89.0 LYMPHEDEMA OF LEFT UPPER EXTREMITY: Primary | ICD-10-CM

## 2023-01-30 DIAGNOSIS — R26.89 IMPAIRMENT OF BALANCE: ICD-10-CM

## 2023-01-30 PROCEDURE — 97140 MANUAL THERAPY 1/> REGIONS: CPT | Mod: GP | Performed by: PHYSICAL THERAPIST

## 2023-02-01 ENCOUNTER — HOSPITAL ENCOUNTER (OUTPATIENT)
Dept: PHYSICAL THERAPY | Facility: CLINIC | Age: 73
Discharge: HOME | End: 2023-02-01
Payer: COMMERCIAL

## 2023-02-01 DIAGNOSIS — I89.0 LYMPHEDEMA OF LEFT UPPER EXTREMITY: Primary | ICD-10-CM

## 2023-02-01 DIAGNOSIS — Z91.81 H/O FALL: ICD-10-CM

## 2023-02-01 DIAGNOSIS — R26.89 IMPAIRMENT OF BALANCE: ICD-10-CM

## 2023-02-01 DIAGNOSIS — L90.5 SCAR CONDITION AND FIBROSIS OF SKIN: ICD-10-CM

## 2023-02-01 DIAGNOSIS — R53.1 DECREASED STRENGTH: ICD-10-CM

## 2023-02-01 DIAGNOSIS — R53.81 PHYSICAL DECONDITIONING: ICD-10-CM

## 2023-02-01 PROCEDURE — 97140 MANUAL THERAPY 1/> REGIONS: CPT | Mod: GP | Performed by: PHYSICAL THERAPIST

## 2023-02-02 ENCOUNTER — HOSPITAL ENCOUNTER (OUTPATIENT)
Dept: PHYSICAL THERAPY | Facility: CLINIC | Age: 73
Discharge: HOME | End: 2023-02-02
Payer: COMMERCIAL

## 2023-02-02 DIAGNOSIS — Z91.81 H/O FALL: ICD-10-CM

## 2023-02-02 DIAGNOSIS — R53.81 PHYSICAL DECONDITIONING: ICD-10-CM

## 2023-02-02 DIAGNOSIS — I89.0 LYMPHEDEMA OF LEFT UPPER EXTREMITY: Primary | ICD-10-CM

## 2023-02-02 DIAGNOSIS — L90.5 SCAR CONDITION AND FIBROSIS OF SKIN: ICD-10-CM

## 2023-02-02 DIAGNOSIS — R26.89 IMPAIRMENT OF BALANCE: ICD-10-CM

## 2023-02-02 DIAGNOSIS — R53.1 DECREASED STRENGTH: ICD-10-CM

## 2023-02-02 PROCEDURE — 97140 MANUAL THERAPY 1/> REGIONS: CPT | Mod: GP | Performed by: PHYSICAL THERAPIST

## 2023-02-02 PROCEDURE — 97110 THERAPEUTIC EXERCISES: CPT | Mod: GP | Performed by: PHYSICAL THERAPIST

## 2023-02-03 ENCOUNTER — THERAPY VISIT (OUTPATIENT)
Dept: PHYSICAL THERAPY | Facility: CLINIC | Age: 73
End: 2023-02-03
Attending: FAMILY MEDICINE
Payer: COMMERCIAL

## 2023-02-03 ENCOUNTER — OFFICE VISIT (OUTPATIENT)
Dept: ORTHOPEDICS | Facility: CLINIC | Age: 73
End: 2023-02-03
Payer: COMMERCIAL

## 2023-02-03 ENCOUNTER — ANCILLARY PROCEDURE (OUTPATIENT)
Dept: GENERAL RADIOLOGY | Facility: CLINIC | Age: 73
End: 2023-02-03
Attending: FAMILY MEDICINE
Payer: COMMERCIAL

## 2023-02-03 DIAGNOSIS — S82.65XA CLOSED NONDISPLACED FRACTURE OF LATERAL MALLEOLUS OF LEFT FIBULA, INITIAL ENCOUNTER: Primary | ICD-10-CM

## 2023-02-03 DIAGNOSIS — M25.572 ACUTE LEFT ANKLE PAIN: ICD-10-CM

## 2023-02-03 DIAGNOSIS — M80.08XG PATHOLOGICAL FRACTURE OF VERTEBRA DUE TO AGE-RELATED OSTEOPOROSIS WITH DELAYED HEALING, SUBSEQUENT ENCOUNTER: ICD-10-CM

## 2023-02-03 DIAGNOSIS — S82.65XA CLOSED NONDISPLACED FRACTURE OF LATERAL MALLEOLUS OF LEFT FIBULA, INITIAL ENCOUNTER: ICD-10-CM

## 2023-02-03 DIAGNOSIS — M25.469 EFFUSION OF LOWER LEG JOINT: ICD-10-CM

## 2023-02-03 PROCEDURE — 99214 OFFICE O/P EST MOD 30 MIN: CPT | Mod: 25 | Performed by: FAMILY MEDICINE

## 2023-02-03 PROCEDURE — 73610 X-RAY EXAM OF ANKLE: CPT | Mod: LT | Performed by: RADIOLOGY

## 2023-02-03 PROCEDURE — 97110 THERAPEUTIC EXERCISES: CPT | Mod: GP | Performed by: PHYSICAL THERAPIST

## 2023-02-03 PROCEDURE — 96372 THER/PROPH/DIAG INJ SC/IM: CPT | Performed by: FAMILY MEDICINE

## 2023-02-03 PROCEDURE — 97530 THERAPEUTIC ACTIVITIES: CPT | Mod: GP | Performed by: PHYSICAL THERAPIST

## 2023-02-03 PROCEDURE — 97112 NEUROMUSCULAR REEDUCATION: CPT | Mod: GP | Performed by: PHYSICAL THERAPIST

## 2023-02-03 NOTE — NURSING NOTE
[address] [address]  Dept: [phone]  ______________________________________________________________________________    Patient: Nadira Perez   : 1950   MRN: [id number]   February 3, 2023    INVASIVE PROCEDURE SAFETY CHECKLIST    Date: February 3, 2023   Procedure: Evenity Injections subcutaneus   Patient Name: Nadira Perez  MRN: [id number]  YOB: 1950    Action: Complete sections as appropriate. Any discrepancy results in a HARD COPY until resolved.     PRE PROCEDURE:  Patient ID verified with 2 identifiers (name and  or MRN): Yes  Procedure and site verified with patient/designee (when able): Yes  Accurate consent documentation in medical record: Yes  H&P (or appropriate assessment) documented in medical record: Yes  H&P must be up to 20 days prior to procedure and updates within 24 hours of procedure as applicable: NA  Relevant diagnostic and radiology test results appropriately labeled and displayed as applicable: Yes  Procedure site(s) marked with provider initials: NA    TIMEOUT:  Time-Out performed immediately prior to starting procedure, including verbal and active participation of all team members addressing the following:Yes  * Correct patient identify  * Confirmed that the correct side and site are marked  * An accurate procedure consent form  * Agreement on the procedure to be done  * Correct patient position  * Relevant images and results are properly labeled and appropriately displayed  * The need to administer antibiotics or fluids for irrigation purposes during the procedure as applicable   * Safety precautions based on patient history or medication use    DURING PROCEDURE: Verification of correct person, site, and procedures any time the responsibility for care of the patient is transferred to another member of the care team.       Prior to injection, verified patient identity using patient's  name and date of birth.  Due to injection administration, patient instructed to remain in clinic for 15 minutes  afterwards, and to report any adverse reaction to me immediately.    Evenity injection    Drug Amount Wasted:  None.  Vial/Syringe: Syringe  Expiration Date:  06/25      Fang Melara, [facility]  February 3, 2023

## 2023-02-03 NOTE — LETTER
2/3/2023      RE: Nadira Perez  [address]  [place] [address]     Dear Colleague,    Thank you for referring your patient, Nadira Perez, to the [facility]. Please see a copy of my visit note below.      Assessment & Plan     Closed nondisplaced fracture of lateral malleolus of left fibula, initial encounter  Evenity #10  - romosozumab-aqqg (EVENITY) injection 210 mg    Pathological fracture of vertebra due to age-related osteoporosis with delayed healing, subsequent encounter    - romosozumab-aqqg (EVENITY) injection 210 mg    Effusion of lower leg joint    - Compression Sleeve/Stocking Order for DME - ONLY FOR DME    Prescription drug management         See Patient Instructions    Return in about 4 weeks (around 3/3/2023), or if symptoms worsen or fail to improve.    Cortney Clark MD  [facility]    Tom Guevara is a 72 year old accompanied by her self, presenting for the following health issues:  RECHECK of the Left Ankle      HPI     Pt is a 73 yo white female with Osteoporosis here for Evenity #10.    Left fibula fracture- healing on x-ray.  Has had PT x 2 visits.      Review of Systems   Constitutional, HEENT, cardiovascular, pulmonary, gi and gu systems are negative, except as otherwise noted.     Objective    There were no vitals taken for this visit.  There is no height or weight on file to calculate BMI.  Physical Exam      Left Foot/Ankle:   Inspection: Swelling - moderate; Bruising - none   Sensation: intact in peroneal, tibial, sural, and saphenous distribution   ROM: Dorsiflexion - decreased; Plantarflexion - decreased; Inversion -intact; Eversion - intact  Strength: 5/5 in all motions    Xray - Reviewed and interpreted by me.  Continued healing of left fibula fracture           Procedure: Evenity #10 Risks and benefits discussed and patient was consented.  Chloroprep used to  clean the area of the skin and ethyl chloride to anesthetize the area.  1 injected each lateral thigh.  Band aids in place.  Well tolerated.        Again, thank you for allowing me to participate in the care of your patient.      Sincerely,    Cortney Clark MD

## 2023-02-03 NOTE — PATIENT INSTRUCTIONS
Ok to discontinue boot    See PT for ROM and strengthening    Compression stockings- swelling bilateral legs    Evenity #11 in 1 month

## 2023-02-08 ENCOUNTER — HOSPITAL ENCOUNTER (OUTPATIENT)
Dept: PHYSICAL THERAPY | Facility: CLINIC | Age: 73
Discharge: HOME | End: 2023-02-08
Payer: COMMERCIAL

## 2023-02-08 DIAGNOSIS — I89.0 LYMPHEDEMA OF LEFT UPPER EXTREMITY: Primary | ICD-10-CM

## 2023-02-08 DIAGNOSIS — R53.1 DECREASED STRENGTH: ICD-10-CM

## 2023-02-08 DIAGNOSIS — R26.89 IMPAIRMENT OF BALANCE: ICD-10-CM

## 2023-02-08 DIAGNOSIS — L90.5 SCAR CONDITION AND FIBROSIS OF SKIN: ICD-10-CM

## 2023-02-08 DIAGNOSIS — Z91.81 H/O FALL: ICD-10-CM

## 2023-02-08 DIAGNOSIS — R53.81 PHYSICAL DECONDITIONING: ICD-10-CM

## 2023-02-08 PROCEDURE — 97140 MANUAL THERAPY 1/> REGIONS: CPT | Mod: GP | Performed by: PHYSICAL THERAPIST

## 2023-02-08 PROCEDURE — 97110 THERAPEUTIC EXERCISES: CPT | Mod: GP | Performed by: PHYSICAL THERAPIST

## 2023-02-09 ENCOUNTER — HOSPITAL ENCOUNTER (OUTPATIENT)
Dept: PHYSICAL THERAPY | Facility: CLINIC | Age: 73
Discharge: HOME | End: 2023-02-09
Payer: COMMERCIAL

## 2023-02-09 DIAGNOSIS — I89.0 LYMPHEDEMA OF LEFT UPPER EXTREMITY: Primary | ICD-10-CM

## 2023-02-09 DIAGNOSIS — R53.1 DECREASED STRENGTH: ICD-10-CM

## 2023-02-09 DIAGNOSIS — L90.5 SCAR CONDITION AND FIBROSIS OF SKIN: ICD-10-CM

## 2023-02-09 PROCEDURE — 97140 MANUAL THERAPY 1/> REGIONS: CPT | Mod: GP | Performed by: PHYSICAL THERAPIST

## 2023-02-10 ENCOUNTER — THERAPY VISIT (OUTPATIENT)
Dept: PHYSICAL THERAPY | Facility: CLINIC | Age: 73
End: 2023-02-10
Attending: FAMILY MEDICINE
Payer: COMMERCIAL

## 2023-02-10 ENCOUNTER — HOSPITAL ENCOUNTER (OUTPATIENT)
Dept: PHYSICAL THERAPY | Facility: CLINIC | Age: 73
Discharge: HOME | End: 2023-02-10
Payer: COMMERCIAL

## 2023-02-10 DIAGNOSIS — I89.0 LYMPHEDEMA OF LEFT UPPER EXTREMITY: Primary | ICD-10-CM

## 2023-02-10 DIAGNOSIS — R26.89 IMPAIRMENT OF BALANCE: ICD-10-CM

## 2023-02-10 DIAGNOSIS — M25.572 ACUTE LEFT ANKLE PAIN: ICD-10-CM

## 2023-02-10 DIAGNOSIS — S82.65XA CLOSED NONDISPLACED FRACTURE OF LATERAL MALLEOLUS OF LEFT FIBULA, INITIAL ENCOUNTER: Primary | ICD-10-CM

## 2023-02-10 DIAGNOSIS — L90.5 SCAR CONDITION AND FIBROSIS OF SKIN: ICD-10-CM

## 2023-02-10 DIAGNOSIS — R53.1 DECREASED STRENGTH: ICD-10-CM

## 2023-02-10 PROCEDURE — 97112 NEUROMUSCULAR REEDUCATION: CPT | Mod: GP | Performed by: PHYSICAL THERAPIST

## 2023-02-10 PROCEDURE — 97530 THERAPEUTIC ACTIVITIES: CPT | Mod: GP | Performed by: PHYSICAL THERAPIST

## 2023-02-10 PROCEDURE — 97110 THERAPEUTIC EXERCISES: CPT | Mod: GP | Performed by: PHYSICAL THERAPIST

## 2023-02-10 PROCEDURE — 97140 MANUAL THERAPY 1/> REGIONS: CPT | Mod: GP | Performed by: PHYSICAL THERAPIST

## 2023-02-14 ENCOUNTER — ANCILLARY PROCEDURE (OUTPATIENT)
Dept: GENERAL RADIOLOGY | Facility: CLINIC | Age: 73
End: 2023-02-14
Attending: STUDENT IN AN ORGANIZED HEALTH CARE EDUCATION/TRAINING PROGRAM
Payer: COMMERCIAL

## 2023-02-14 ENCOUNTER — OFFICE VISIT (OUTPATIENT)
Dept: ORTHOPEDICS | Facility: CLINIC | Age: 73
End: 2023-02-14
Payer: OTHER MISCELLANEOUS

## 2023-02-14 VITALS — BODY MASS INDEX: 28.82 KG/M2 | WEIGHT: 184 LBS

## 2023-02-14 DIAGNOSIS — M19.039 WRIST ARTHRITIS: ICD-10-CM

## 2023-02-14 DIAGNOSIS — M25.531 BILATERAL WRIST PAIN: Primary | ICD-10-CM

## 2023-02-14 DIAGNOSIS — M25.531 BILATERAL WRIST PAIN: ICD-10-CM

## 2023-02-14 DIAGNOSIS — M25.532 BILATERAL WRIST PAIN: Primary | ICD-10-CM

## 2023-02-14 DIAGNOSIS — M65.332 TRIGGER FINGER, LEFT MIDDLE FINGER: ICD-10-CM

## 2023-02-14 DIAGNOSIS — M25.532 BILATERAL WRIST PAIN: ICD-10-CM

## 2023-02-14 PROBLEM — G56.01 CARPAL TUNNEL SYNDROME OF RIGHT WRIST: Status: ACTIVE | Noted: 2021-10-21

## 2023-02-14 PROCEDURE — 20550 NJX 1 TENDON SHEATH/LIGAMENT: CPT | Mod: F2 | Performed by: STUDENT IN AN ORGANIZED HEALTH CARE EDUCATION/TRAINING PROGRAM

## 2023-02-14 PROCEDURE — 73110 X-RAY EXAM OF WRIST: CPT | Mod: TC | Performed by: RADIOLOGY

## 2023-02-14 PROCEDURE — 99214 OFFICE O/P EST MOD 30 MIN: CPT | Mod: 25 | Performed by: STUDENT IN AN ORGANIZED HEALTH CARE EDUCATION/TRAINING PROGRAM

## 2023-02-14 PROCEDURE — 20606 DRAIN/INJ JOINT/BURSA W/US: CPT | Mod: 50 | Performed by: STUDENT IN AN ORGANIZED HEALTH CARE EDUCATION/TRAINING PROGRAM

## 2023-02-14 RX ORDER — TRIAMCINOLONE ACETONIDE 40 MG/ML
40 INJECTION, SUSPENSION INTRA-ARTICULAR; INTRAMUSCULAR
Status: DISCONTINUED | OUTPATIENT
Start: 2023-02-14 | End: 2024-02-16

## 2023-02-14 RX ORDER — LIDOCAINE HYDROCHLORIDE 10 MG/ML
1 INJECTION, SOLUTION INFILTRATION; PERINEURAL
Status: DISCONTINUED | OUTPATIENT
Start: 2023-02-14 | End: 2023-09-12

## 2023-02-14 RX ORDER — TESTOSTERONE CYPIONATE 200 MG/ML
1 INJECTION INTRAMUSCULAR
Status: DISCONTINUED | OUTPATIENT
Start: 2023-02-14 | End: 2024-02-17

## 2023-02-14 RX ADMIN — LIDOCAINE HYDROCHLORIDE 1 ML: 10 INJECTION, SOLUTION INFILTRATION; PERINEURAL at 14:47

## 2023-02-14 RX ADMIN — LIDOCAINE HYDROCHLORIDE 1 ML: 10 INJECTION, SOLUTION INFILTRATION; PERINEURAL at 14:45

## 2023-02-14 RX ADMIN — TRIAMCINOLONE ACETONIDE 40 MG: 40 INJECTION, SUSPENSION INTRA-ARTICULAR; INTRAMUSCULAR at 14:47

## 2023-02-14 RX ADMIN — TESTOSTERONE CYPIONATE 1 ML: 200 INJECTION INTRAMUSCULAR at 14:45

## 2023-02-14 NOTE — PROGRESS NOTES
"[facility] Progress Note    Patient Name: Nadira Perez  MRN: [id number]  : 1950  Date: 2023    Date of Surgery: 21    Surgery Performed: 1) Open right carpal tunnel release  2) Removal of buried implant (deep) right wrist    Subjective:  Update 2023 Patient last seen on 22. At which time patient had cortisone injection for A1 pulley of left ring finger and bilateral radiocarpal injections. She states these are quite effective for a while after the injections. Patient was to continue wearing  She returns today for repeat radiocarpal injections. She has developed dorsal radial swelling over the carpus more pronounced on the right. She has developed locking/catching of the left long finger as well.    22: Patient was last seen 5/3/22 at which time I provided her with bilateral radiocarpal injections and new braces. She notes since last visit she sustained a fall around , and sustained nondisplaced fractures to left ring finger and right thumb for which she was treated by Dr. Miguel. She has healed fine from those, states thumb IP still a bit stiff. She has continued to have bilateral wrist pain and notes new onset of \"spasms\" in left hand with gripping/ grasping objections within the last 2-3 weeks. She states that her left small and ring fingers seem to lock and spasm with /finger flexion. She has to forcibly extend it sometimes to make them straight with pain. She states this must have happened 6-7 times over the last 2 months.      Objective:  There were no vitals taken for this visit.    General: alert, appropriate, NAD  HEENT: NC/AT  CV: RRR by pulse  Pulm: Unlabored on RA  MSK:  BUE   Volar and dorsal incisions c/d/i, no e/o infection  No tenderness of the right thumb  Moderate pain to palpation dorsal central wrists bilaterally  Mild effusion and capsular swelling over dorsal radial wrist over the carpus  +TTP over left middle finger A1 pulley " with mild crepitus, full flexion/extension, able to make full fist, no malrotation or scissoring. No visible triggering  ROM baseline  Intact EPL/FPL/APB/HI  Diminished sensation median nerve distribution baseline  WWP CR< 2s    Imaging:  None new. XRs of left ring finger and right hand from 5/4/2022 and 5/31/2022 reviewed.  These demonstrate nondisplaced fractures of the right thumb distal phalanx and left ring distal phalanx that have healed.      Repeat XRs of bilateral wrists demonstrates Unchanged alignment and appearance of 4 corner fusion on the left with ulnar subluxation of the carpus.  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 Right s/p hardware removal for second failed 4 corner fusion, and carpal tunnel release.    Here today for bilateral radiocarpal injections.    After obtaining written consent, I cleansed the skin over bilateral wrist joints with chlorhexidine.  I then anesthetized the skin with ethyl chloride freeze spray spray, after which I injected 1 cc of Kenalog 40 mg and 1 cc of 1% lidocaine into bilateral radiocarpal joint through the 3-4 interval.  The patient tolerated the procedures well and there were no complications.  She noted immediate improvement in her wrist pain.    I then cleansed the skin over the A1 pulley of the left middle finger with chlorhexidine.  I then anesthetized skin with ethyl chloride free spray, after which I injected 1 cc of dexamethasone 4 mg/mL and 1 cc of 1% lidocaine into the A1 pulley and flexor tendon sheath of the left ring finger    Continue brace wear. Continue radiocarpal injections every 4-6 months for symptomatic control, until she decides that she wants to proceed with total wrist fusion on the  right.    Follow-up as needed.    Hand / Upper Extremity Injection/Arthrocentesis: L long A1    Date/Time: 2/14/2023 2:45 PM  Performed by: Rolan Conley MD  Authorized by: Rolan Conley MD     Indications:  Pain  Needle Size:  25 G  Guidance: landmark    Condition: trigger finger    Location:  Long finger    Site:  L long A1  Medications:  1 mL dexamethasone 120 MG/30ML; 1 mL lidocaine 1 %  Outcome:  Tolerated well, no immediate complications  Procedure discussed: discussed risks, benefits, and alternatives    Timeout: timeout called immediately prior to procedure    Prep: patient was prepped and draped in usual sterile fashion    Medium Joint Injection/Arthrocentesis: bilateral radiocarpal    Date/Time: 2/14/2023 2:47 PM  Performed by: Rolan Conley MD  Authorized by: Rolan Conley MD     Indications:  Pain  Needle Size:  25 G  Guidance: ultrasound    Location:  Wrist  Laterality:  Bilateral  Site:  Bilateral radiocarpal  Medications (Right):  1 mL lidocaine 1 %; 40 mg triamcinolone 40 MG/ML  Medications (Left):  1 mL lidocaine 1 %; 40 mg triamcinolone 40 MG/ML  Outcome:  Tolerated well, no immediate complications  Procedure discussed: discussed risks, benefits, and alternatives    Consent Given by:  Patient  Timeout: timeout called immediately prior to procedure    Prep: patient was prepped and draped in usual sterile fashion        Rolan Conley MD    Hand, Upper Extremity & Microvascular Surgery  Department of Orthopedic Surgery  [facility]

## 2023-02-14 NOTE — LETTER
"    2023         RE: Nadira Perez  [address]  [facility] [address]        Dear Colleague,    Thank you for referring your patient, Nadira Perez, to the [facility] [place]. Please see a copy of my visit note below.    Orthopaedic Surgery Hand Clinic Progress Note    Patient Name: Nadira Perez  MRN: [id number]  : 1950  Date: 2023    Date of Surgery: 21    Surgery Performed: 1) Open right carpal tunnel release  2) Removal of buried implant (deep) right wrist    Subjective:  Update 2023 Patient last seen on 22. At which time patient had cortisone injection for A1 pulley of left ring finger and bilateral radiocarpal injections. She states these are quite effective for a while after the injections. Patient was to continue wearing  She returns today for repeat radiocarpal injections. She has developed dorsal radial swelling over the carpus more pronounced on the right. She has developed locking/catching of the left long finger as well.    22: Patient was last seen 5/3/22 at which time I provided her with bilateral radiocarpal injections and new braces. She notes since last visit she sustained a fall around , and sustained nondisplaced fractures to left ring finger and right thumb for which she was treated by Dr. Miguel. She has healed fine from those, states thumb IP still a bit stiff. She has continued to have bilateral wrist pain and notes new onset of \"spasms\" in left hand with gripping/ grasping objections within the last 2-3 weeks. She states that her left small and ring fingers seem to lock and spasm with /finger flexion. She has to forcibly extend it sometimes to make them straight with pain. She states this must have happened 6-7 times over the last 2 months.      Objective:  There were no vitals taken for this visit.    General: alert, appropriate, NAD  HEENT: NC/AT  CV: RRR by pulse  Pulm: Unlabored on " RA  MSK:  BUE   Volar and dorsal incisions c/d/i, no e/o infection  No tenderness of the right thumb  Moderate pain to palpation dorsal central wrists bilaterally  Mild effusion and capsular swelling over dorsal radial wrist over the carpus  +TTP over left middle finger A1 pulley with mild crepitus, full flexion/extension, able to make full fist, no malrotation or scissoring. No visible triggering  ROM baseline  Intact EPL/FPL/APB/HI  Diminished sensation median nerve distribution baseline  WWP CR< 2s    Imaging:  None new. XRs of left ring finger and right hand from 5/4/2022 and 5/31/2022 reviewed.  These demonstrate nondisplaced fractures of the right thumb distal phalanx and left ring distal phalanx that have healed.      Repeat XRs of bilateral wrists demonstrates Unchanged alignment and appearance of 4 corner fusion on the left with ulnar subluxation of the carpus.  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 Right s/p hardware removal for second failed 4 corner fusion, and carpal tunnel release.    Here today for bilateral radiocarpal injections.    After obtaining written consent, I cleansed the skin over bilateral wrist joints with chlorhexidine.  I then anesthetized the skin with ethyl chloride freeze spray spray, after which I injected 1 cc of Kenalog 40 mg and 1 cc of 1% lidocaine into bilateral radiocarpal joint through the 3-4 interval.  The patient tolerated the procedures well and there were no complications.  She noted immediate improvement in her wrist pain.    I then cleansed the skin over the A1 pulley of the left middle finger with chlorhexidine.  I then anesthetized skin with ethyl chloride free spray, after which I injected 1 cc of  dexamethasone 4 mg/mL and 1 cc of 1% lidocaine into the A1 pulley and flexor tendon sheath of the left ring finger    Continue brace wear. Continue radiocarpal injections every 4-6 months for symptomatic control, until she decides that she wants to proceed with total wrist fusion on the right.    Follow-up as needed.    Hand / Upper Extremity Injection/Arthrocentesis: L long A1    Date/Time: 2/14/2023 2:45 PM  Performed by: Rolan Conley MD  Authorized by: Rolan Conley MD     Indications:  Pain  Needle Size:  25 G  Guidance: landmark    Condition: trigger finger    Location:  Long finger    Site:  L long A1  Medications:  1 mL dexamethasone 120 MG/30ML; 1 mL lidocaine 1 %  Outcome:  Tolerated well, no immediate complications  Procedure discussed: discussed risks, benefits, and alternatives    Timeout: timeout called immediately prior to procedure    Prep: patient was prepped and draped in usual sterile fashion    Medium Joint Injection/Arthrocentesis: bilateral radiocarpal    Date/Time: 2/14/2023 2:47 PM  Performed by: Rolan Conley MD  Authorized by: Rolan Conley MD     Indications:  Pain  Needle Size:  25 G  Guidance: ultrasound    Location:  Wrist  Laterality:  Bilateral  Site:  Bilateral radiocarpal  Medications (Right):  1 mL lidocaine 1 %; 40 mg triamcinolone 40 MG/ML  Medications (Left):  1 mL lidocaine 1 %; 40 mg triamcinolone 40 MG/ML  Outcome:  Tolerated well, no immediate complications  Procedure discussed: discussed risks, benefits, and alternatives    Consent Given by:  Patient  Timeout: timeout called immediately prior to procedure    Prep: patient was prepped and draped in usual sterile fashion        Rolan Conley MD    Hand, Upper Extremity & Microvascular Surgery  Department of Orthopedic Surgery  [facility]            Again, thank you for allowing me to participate in the care of your patient.        Sincerely,        Rolan Conley MD

## 2023-02-15 ENCOUNTER — HOSPITAL ENCOUNTER (OUTPATIENT)
Dept: PHYSICAL THERAPY | Facility: CLINIC | Age: 73
Discharge: HOME | End: 2023-02-15
Payer: COMMERCIAL

## 2023-02-15 DIAGNOSIS — L90.5 SCAR CONDITION AND FIBROSIS OF SKIN: ICD-10-CM

## 2023-02-15 DIAGNOSIS — I89.0 LYMPHEDEMA OF LEFT UPPER EXTREMITY: Primary | ICD-10-CM

## 2023-02-15 DIAGNOSIS — R26.89 IMPAIRMENT OF BALANCE: ICD-10-CM

## 2023-02-15 PROCEDURE — 97112 NEUROMUSCULAR REEDUCATION: CPT | Mod: GP | Performed by: PHYSICAL THERAPIST

## 2023-02-15 PROCEDURE — 97140 MANUAL THERAPY 1/> REGIONS: CPT | Mod: GP | Performed by: PHYSICAL THERAPIST

## 2023-02-22 ENCOUNTER — HOSPITAL ENCOUNTER (OUTPATIENT)
Dept: PHYSICAL THERAPY | Facility: CLINIC | Age: 73
Discharge: HOME | End: 2023-02-22
Payer: COMMERCIAL

## 2023-02-22 DIAGNOSIS — R26.89 IMPAIRMENT OF BALANCE: ICD-10-CM

## 2023-02-22 DIAGNOSIS — L90.5 SCAR CONDITION AND FIBROSIS OF SKIN: ICD-10-CM

## 2023-02-22 DIAGNOSIS — I89.0 LYMPHEDEMA OF LEFT UPPER EXTREMITY: Primary | ICD-10-CM

## 2023-02-22 DIAGNOSIS — R53.81 PHYSICAL DECONDITIONING: ICD-10-CM

## 2023-02-22 PROCEDURE — 97140 MANUAL THERAPY 1/> REGIONS: CPT | Mod: GP | Performed by: PHYSICAL THERAPIST

## 2023-03-01 ENCOUNTER — HOSPITAL ENCOUNTER (OUTPATIENT)
Dept: PHYSICAL THERAPY | Facility: CLINIC | Age: 73
Discharge: HOME | End: 2023-03-01
Payer: COMMERCIAL

## 2023-03-01 DIAGNOSIS — I89.0 LYMPHEDEMA OF LEFT UPPER EXTREMITY: Primary | ICD-10-CM

## 2023-03-01 DIAGNOSIS — L90.5 SCAR CONDITION AND FIBROSIS OF SKIN: ICD-10-CM

## 2023-03-01 DIAGNOSIS — R53.81 PHYSICAL DECONDITIONING: ICD-10-CM

## 2023-03-01 DIAGNOSIS — R26.89 IMPAIRMENT OF BALANCE: ICD-10-CM

## 2023-03-01 PROCEDURE — 97140 MANUAL THERAPY 1/> REGIONS: CPT | Mod: GP | Performed by: PHYSICAL THERAPIST

## 2023-03-01 PROCEDURE — 97110 THERAPEUTIC EXERCISES: CPT | Mod: GP | Performed by: PHYSICAL THERAPIST

## 2023-03-01 NOTE — DISCHARGE INSTRUCTIONS
LYMPHEDEMA AND EXERCISE HOME PROGRAM    Wearing compression garments (sleeve, gauntlet, glove, knee highs) during day only - REMOVE BEFORE BED.    2. Bandaging of left arm/hand 1x/week ideally x 24 hours but option to decrease time as needed.     3. Aerobic ex: GOAL = 150'/week    4. Strengthening = 2x/week; Stretching = daily    5. Balance = minimum of 2x/week    6.  Skin Care/ infection precautions - monitor skin regularly and follow up with medical provider with any concerns    7.  Garments - ready made garments typically last 3-4 months if only 1 garment used daily and laundered after each use. Use of  [employer] Easy slide for getting sleeve on.  A 10-15lb weight change can also necessitate a new garment.    8. Short stretch bandages typically should last 1 year; assess for sufficient elasticity after laundered.

## 2023-03-03 ENCOUNTER — OFFICE VISIT (OUTPATIENT)
Dept: ORTHOPEDICS | Facility: CLINIC | Age: 73
End: 2023-03-03
Payer: COMMERCIAL

## 2023-03-03 ENCOUNTER — TELEPHONE (OUTPATIENT)
Dept: AUDIOLOGY | Facility: CLINIC | Age: 73
End: 2023-03-03

## 2023-03-03 DIAGNOSIS — M80.08XG PATHOLOGICAL FRACTURE OF VERTEBRA DUE TO AGE-RELATED OSTEOPOROSIS WITH DELAYED HEALING, SUBSEQUENT ENCOUNTER: Primary | ICD-10-CM

## 2023-03-03 PROCEDURE — 96372 THER/PROPH/DIAG INJ SC/IM: CPT | Performed by: FAMILY MEDICINE

## 2023-03-03 PROCEDURE — 99207 PR DROP WITH A PROCEDURE: CPT | Performed by: FAMILY MEDICINE

## 2023-03-03 NOTE — LETTER
3/3/2023      RE: Nadira Perez  [address]  [facility] [address]     Dear Colleague,    Thank you for referring your patient, Nadira Perez, to the [facility]. Please see a copy of my visit note below.      Assessment & Plan     Pathological fracture of vertebra due to age-related osteoporosis with delayed healing, subsequent encounter  Evenity #11  - romosozumab-aqqg (EVENITY) injection 210 mg             See Patient Instructions    Return in about 6 weeks (around 4/14/2023), or if symptoms worsen or fail to improve.    Cortney Clark MD  [facility]    Tom Guevara is a 72 year old accompanied by her self, presenting for the following health issues:  Evenity 11/12      HPI     Patient 72-year-old female with osteoporosis here for her 11th Evenity injection.  Patient has tolerated injections well and states she has had little side effect from the injections.  No chest pain, headaches, bone pain noted.    Review of Systems   Constitutional, HEENT, cardiovascular, pulmonary, gi and gu systems are negative, except as otherwise noted.     Objective    There were no vitals taken for this visit.  There is no height or weight on file to calculate BMI.  Physical Exam   NAD  nonlabored               Procedure: Evenity #11 Risks and benefits discussed and patient was consented.  Chloroprep used to clean the area of the skin and ethyl chloride to anesthetize the area.  1 injected each lateral thigh.  Band aids in place.  Well tolerated.        Again, thank you for allowing me to participate in the care of your patient.      Sincerely,    Cortney Clark MD

## 2023-03-03 NOTE — TELEPHONE ENCOUNTER
Walk-in hearing aid services on 3/3/23: The patient asked to have her hearing aids cleaned and checked.  Both hearing aids were deep cleaned and the  filter, domes and retention locks were replaced.  It was noted that a significant amount of wax was attached to the outside of the right  prior to cleaning.  After cleaning, both hearing aids were found to be working properly and were returned to the patient.  She was also provided with an [employer] application brochure and details/information regarding her devicecs.

## 2023-03-03 NOTE — NURSING NOTE
[address] [address]  Dept: [phone]  ______________________________________________________________________________    Patient: Nadira Perez   : 1950   MRN: [id number]   March 3, 2023    INVASIVE PROCEDURE SAFETY CHECKLIST    Date: 3/3/23   Procedure: Bilateral subcutaneous Evenity injections   Patient Name: Nadira Perez  MRN: [id number]  YOB: 1950    Action: Complete sections as appropriate. Any discrepancy results in a HARD COPY until resolved.     PRE PROCEDURE:  Patient ID verified with 2 identifiers (name and  or MRN): Yes  Procedure and site verified with patient/designee (when able): Yes  Accurate consent documentation in medical record: Yes  H&P (or appropriate assessment) documented in medical record: Yes  H&P must be up to 20 days prior to procedure and updates within 24 hours of procedure as applicable: NA  Relevant diagnostic and radiology test results appropriately labeled and displayed as applicable: Yes  Procedure site(s) marked with provider initials: NA    TIMEOUT:  Time-Out performed immediately prior to starting procedure, including verbal and active participation of all team members addressing the following:Yes  * Correct patient identify  * Confirmed that the correct side and site are marked  * An accurate procedure consent form  * Agreement on the procedure to be done  * Correct patient position  * Relevant images and results are properly labeled and appropriately displayed  * The need to administer antibiotics or fluids for irrigation purposes during the procedure as applicable   * Safety precautions based on patient history or medication use    DURING PROCEDURE: Verification of correct person, site, and procedures any time the responsibility for care of the patient is transferred to another member of the care team.       Prior to injection, verified patient identity using patient's  name and date of birth.  Due to injection administration, patient instructed to remain in clinic for 15 minutes  afterwards, and to report any adverse reaction to me immediately.    Subcutaneous injections were performed    Drug Amount Wasted:  None.  Vial/Syringe: Single dose vial  Expiration Date:  6/1/25      Fausto Browning, ATC  March 3, 2023

## 2023-03-03 NOTE — PROGRESS NOTES
Assessment & Plan     Pathological fracture of vertebra due to age-related osteoporosis with delayed healing, subsequent encounter  Evenity #11  - romosozumab-aqqg (EVENITY) injection 210 mg             See Patient Instructions    Return in about 6 weeks (around 4/14/2023), or if symptoms worsen or fail to improve.    Cortney Clark MD  [facility] THIERRY Guevara is a 72 year old accompanied by her self, presenting for the following health issues:  Evenity 11/12      HPI     Patient 72-year-old female with osteoporosis here for her 11th Evenity injection.  Patient has tolerated injections well and states she has had little side effect from the injections.  No chest pain, headaches, bone pain noted.    Review of Systems   Constitutional, HEENT, cardiovascular, pulmonary, gi and gu systems are negative, except as otherwise noted.      Objective    There were no vitals taken for this visit.  There is no height or weight on file to calculate BMI.  Physical Exam   NAD  nonlabored                Procedure: Evenity #11 Risks and benefits discussed and patient was consented.  Chloroprep used to clean the area of the skin and ethyl chloride to anesthetize the area.  1 injected each lateral thigh.  Band aids in place.  Well tolerated.

## 2023-03-06 ENCOUNTER — OFFICE VISIT (OUTPATIENT)
Dept: PALLIATIVE MEDICINE | Facility: OTHER | Age: 73
End: 2023-03-06
Payer: COMMERCIAL

## 2023-03-06 DIAGNOSIS — M54.59 OTHER LOW BACK PAIN: ICD-10-CM

## 2023-03-06 DIAGNOSIS — G89.29 CHRONIC BILATERAL LOW BACK PAIN, UNSPECIFIED WHETHER SCIATICA PRESENT: ICD-10-CM

## 2023-03-06 DIAGNOSIS — Z98.1 S/P SPINAL FUSION: Primary | ICD-10-CM

## 2023-03-06 DIAGNOSIS — M54.50 CHRONIC BILATERAL LOW BACK PAIN, UNSPECIFIED WHETHER SCIATICA PRESENT: ICD-10-CM

## 2023-03-06 PROCEDURE — 97810 ACUP 1/> WO ESTIM 1ST 15 MIN: CPT | Performed by: ACUPUNCTURIST

## 2023-03-06 PROCEDURE — 97811 ACUP 1/> W/O ESTIM EA ADD 15: CPT | Performed by: ACUPUNCTURIST

## 2023-03-06 ASSESSMENT — PAIN SCALES - PAIN ENJOYMENT GENERAL ACTIVITY SCALE (PEG)
INTERFERED_GENERAL_ACTIVITY: 5
INTERFERED_ENJOYMENT_LIFE: 4
AVG_PAIN_PASTWEEK: 3
PEG_TOTALSCORE: 4

## 2023-03-06 NOTE — PROGRESS NOTES
ACUPUNCTURIST TREATMENT NOTE      Nadira Perez, a 72 year old female, is here today for Follow - Up exam. Patient is referred by [facility].    HPI  Main Complaint: Chronic low back pain  Follow-up: Patient reports continued improvement with low back pain from acupuncture. Patient denies continuous radiating pain at time of treatment and reports that she experiences radiating pain now less frequent and intermittently. Patient has mild swelling in (R) ankle, she has been out of her boot now for 3 weeks and reports she is doing well.  She is wearing her wrist braces today, she reports the acupuncture has been helping with her wrist pain.    Past Medical History  Past Medical History Reviewed: Yes   has a past medical history of Arthritis, Bone disease, Breast cancer (H), H/O kyphoplasty, Hearing problem, History of kidney stones, History of radiation therapy, Hyperlipemia, Hypertension, Hypopotassemia, Kidney problem, Lymph edema, Medullary sponge kidney, Osteopenia, PONV (postoperative nausea and vomiting), Reduced vision, Squamous cell skin cancer, and Thyroid disease.    Objective  Basic Exam Completed:   No    TCM Exam Completed: Yes   Limbs/Back: Bilateral Upper Extremity, Right Lower Extremity and Lower Back    Tongue/Pulse Exam Completed: Yes           Patient Assessment  Patient Type: Pain  Patient Complaint: Chronic low back pain    Acupuncture 1/24/2023 3/6/2023   Intervention Reason Pain Pain   Pain Location low back low back   Pre-session Pain Rating 5 4   Post-session Pain Rating 3 0   Pain 2 Location - -   Pre-session Pain 2 Rating - -   Post-session Pain 2 Rating - -   Pain 3 Location - -   Pre-session Pain 3 Rating - -   Post-session Pain 3 Rating - -       TCM Diagnosis: Qi Stagnation;Blood Stagnation;Kidney Qi Deficiency      Treatment Principle: channel obstruction of Sreekanth Colon and Billy Colon; heart fire    TCM / Acupuncture Treatment  Acupuncture Points:       Initial insertions: Du 20, scalp x2  bilateral, HTJJ L2-L5, Raj Marin, BL 23, Scott Toure,       Second insertions: LI 4, LI 5, TW 4, GB 34, BL 57, Sp 6, BL 62, GB 41            Number of needles inserted: 37  Number of needles removed: 37    Accessory Techniques 1/24/2023 3/6/2023   Accessory Techniques TDP Heat Lamp TDP Heat Lamp; Tuina; Topicals   TDP Heat Lamp location used low back over low back   Tuina location used - low back   Topicals - Po Sum On   Topicals location used - low back     Oswestry Disability Index (JOHN   Nish Weeks 1980, All rights reserved) 8/8/2022 11/3/2022   Section 1 - Pain intensity The pain is moderate at the moment. The pain is moderate at the moment.   Section 2 - Personal care (washing, dressing, etc.)  I need some help but manage most of my personal care. I can look after myself normally but it is very painful.   Section 3 - Lifting I can only lift very light weights. I can only lift very light weights.   Section 4 - Walking I can only walk using a cane or crutches. Pain prevents me from walking more than a quarter of a mile.   Section 5 - Sitting Pain prevents me from sitting for more than half an hour. Pain prevents me from sitting for more than 1 hour.   Section 6 - Standing Pain prevents me from standing for more than 10 minutes. Pain prevents me from standing for more than half an hour.   Section 7 - Sleeping Because of pain I have less than 4 hours sleep. Because of pain I have less than 6 hours sleep.   Section 8 - Sex life (if applicable) Not answered/NA Not answered/NA   Section 9 - Social life Pain has restricted my social life and I do not go out as often. Pain has restricted my social life and I do not go out as often.   Section 10 - Traveling I can travel anywhere without pain. I can travel anywhere without pain.   Oswestry Score (%) 57.78 42.22       Assessment and Plan  Treatment Observations: Patient reported immediate pain relief and demonstrated she was able to stand up straight with no pain.  Pt.  rested comfortably.  Acupuncture Treatment Recommendations: Diagnoses for treatment: Z98.1 (ICD-10-CM) - S/P spinal fusion  M54.50, G89.29 (ICD-10-CM) - Chronic low back pain, Other low back pain.       It is my recommendation that this patient seek advice from their Primary Care Provider about active symptoms not addressed during this visit. The risks and benefits of acupuncture were reviewed and the patient stated understanding. The patient's questions were answered to their satisfaction. Consent was provided for treatment. We thank you for the referral and opportunity to treat this patient.    Time Spent with Patient:   I spent a total of 30 minutes face-to-face with Nadira Perez during today's office visit.     Daphne Kothari L.Ac.

## 2023-03-10 ENCOUNTER — THERAPY VISIT (OUTPATIENT)
Dept: PHYSICAL THERAPY | Facility: CLINIC | Age: 73
End: 2023-03-10
Payer: COMMERCIAL

## 2023-03-10 DIAGNOSIS — S82.65XA CLOSED NONDISPLACED FRACTURE OF LATERAL MALLEOLUS OF LEFT FIBULA, INITIAL ENCOUNTER: Primary | ICD-10-CM

## 2023-03-10 DIAGNOSIS — M25.572 ACUTE LEFT ANKLE PAIN: ICD-10-CM

## 2023-03-10 PROCEDURE — 97112 NEUROMUSCULAR REEDUCATION: CPT | Mod: GP | Performed by: PHYSICAL THERAPIST

## 2023-03-10 PROCEDURE — 97530 THERAPEUTIC ACTIVITIES: CPT | Mod: GP | Performed by: PHYSICAL THERAPIST

## 2023-03-10 PROCEDURE — 97110 THERAPEUTIC EXERCISES: CPT | Mod: GP | Performed by: PHYSICAL THERAPIST

## 2023-03-10 NOTE — PROGRESS NOTES
Subjective:  HPI  Physical Exam                    Objective:  System    Physical Exam    General     ROS    Assessment/Plan:    DISCHARGE REPORT    Progress reporting period is from IE to 3/10/2023.       SUBJECTIVE  Subjective changes noted by patient:  .  Subjective: L leg doing well.  Noting more of LBP/stiffness (varies L to R) vs L ankle limitations    Current pain level is  Current Pain level: 0/10 (ankle).     Previous pain level was   Initial Pain level: 1/10.   Changes in function:  Yes (See Goal flowsheet attached for changes in current functional level)  Adverse reaction to treatment or activity: None    OBJECTIVE  Changes noted in objective findings:    Objective: L ankle MMT: DF 4+/5, PF 4+/5, IN 4/5, EV 4+/5.  TR x 10 B.  A/PROM: L ankle DF 8/12.  Impoved push off with gait, cont cuing for upright posture     ASSESSMENT/PLAN  Updated problem list and treatment plan: Diagnosis 1:  S/P L fib fx  Decreased ROM/flexibility - home program  Decreased strength - home program  Decreased proprioception - neuro re-education and therapeutic activities  Impaired gait - gait training  Impaired muscle performance - neuro re-education  Decreased function - therapeutic activities  STG/LTGs have been met or progress has been made towards goals:  Yes (See Goal flow sheet completed today.)  Assessment of Progress: The patient's condition is improving.  Self Management Plans:  Patient is independent in a home treatment program.  Patient is independent in self management of symptoms.  I have re-evaluated this patient and find that the nature, scope, duration and intensity of the therapy is appropriate for the medical condition of the patient.  Nadira continues to require the following intervention to meet STG and LTG's:  PT intervention is no longer required to meet STG/LTG.    Recommendations:  This patient is ready to be discharged from therapy and continue their home treatment program.    Please refer to the daily  flowsheet for treatment today, total treatment time and time spent performing 1:1 timed codes.

## 2023-03-15 ENCOUNTER — DOCUMENTATION ONLY (OUTPATIENT)
Dept: INTERNAL MEDICINE | Facility: CLINIC | Age: 73
End: 2023-03-15
Payer: COMMERCIAL

## 2023-03-15 NOTE — PROGRESS NOTES
Type of Form Received: RX    Form Received (Date) 3/15/23   Form Filled out No   Placed in provider folder Yes

## 2023-03-16 ENCOUNTER — MEDICAL CORRESPONDENCE (OUTPATIENT)
Dept: HEALTH INFORMATION MANAGEMENT | Facility: CLINIC | Age: 73
End: 2023-03-16
Payer: COMMERCIAL

## 2023-03-23 NOTE — PROGRESS NOTES
Assessment & Plan     Closed nondisplaced fracture of lateral malleolus of left fibula, initial encounter  Evenity #10  - romosozumab-aqqg (EVENITY) injection 210 mg    Pathological fracture of vertebra due to age-related osteoporosis with delayed healing, subsequent encounter    - romosozumab-aqqg (EVENITY) injection 210 mg    Effusion of lower leg joint    - Compression Sleeve/Stocking Order for DME - ONLY FOR DME    Prescription drug management         See Patient Instructions    Return in about 4 weeks (around 3/3/2023), or if symptoms worsen or fail to improve.    Cortney Clark MD  [facility] THIERRY Guevara is a 72 year old accompanied by her self, presenting for the following health issues:  RECHECK of the Left Ankle      HPI     Pt is a 71 yo white female with Osteoporosis here for Evenity #10.    Left fibula fracture- healing on x-ray.  Has had PT x 2 visits.      Review of Systems   Constitutional, HEENT, cardiovascular, pulmonary, gi and gu systems are negative, except as otherwise noted.      Objective    There were no vitals taken for this visit.  There is no height or weight on file to calculate BMI.  Physical Exam      Left Foot/Ankle:   Inspection: Swelling - moderate; Bruising - none   Sensation: intact in peroneal, tibial, sural, and saphenous distribution   ROM: Dorsiflexion - decreased; Plantarflexion - decreased; Inversion -intact; Eversion - intact  Strength: 5/5 in all motions    Xray - Reviewed and interpreted by me.  Continued healing of left fibula fracture            Procedure: Evenity #10 Risks and benefits discussed and patient was consented.  Chloroprep used to clean the area of the skin and ethyl chloride to anesthetize the area.  1 injected each lateral thigh.  Band aids in place.  Well tolerated.     negative

## 2023-03-24 ENCOUNTER — OFFICE VISIT (OUTPATIENT)
Dept: NEUROLOGY | Facility: CLINIC | Age: 73
End: 2023-03-24
Attending: STUDENT IN AN ORGANIZED HEALTH CARE EDUCATION/TRAINING PROGRAM
Payer: COMMERCIAL

## 2023-03-24 DIAGNOSIS — Z17.0 MALIGNANT NEOPLASM OF BREAST IN FEMALE, ESTROGEN RECEPTOR POSITIVE, UNSPECIFIED LATERALITY, UNSPECIFIED SITE OF BREAST (H): ICD-10-CM

## 2023-03-24 DIAGNOSIS — M21.371 RIGHT FOOT DROP: ICD-10-CM

## 2023-03-24 DIAGNOSIS — M54.17 LUMBOSACRAL RADICULOPATHY: ICD-10-CM

## 2023-03-24 DIAGNOSIS — G62.9 PERIPHERAL POLYNEUROPATHY: Primary | ICD-10-CM

## 2023-03-24 DIAGNOSIS — I89.0 LYMPHEDEMA: ICD-10-CM

## 2023-03-24 DIAGNOSIS — C50.919 MALIGNANT NEOPLASM OF BREAST IN FEMALE, ESTROGEN RECEPTOR POSITIVE, UNSPECIFIED LATERALITY, UNSPECIFIED SITE OF BREAST (H): ICD-10-CM

## 2023-03-24 PROCEDURE — 95912 NRV CNDJ TEST 11-12 STUDIES: CPT | Performed by: PHYSICAL MEDICINE & REHABILITATION

## 2023-03-24 PROCEDURE — 95886 MUSC TEST DONE W/N TEST COMP: CPT | Mod: LT | Performed by: PHYSICAL MEDICINE & REHABILITATION

## 2023-03-24 NOTE — PROGRESS NOTES
[facility]  Electrodiagnostic Laboratory                 Department of Neurology                                                                                                         Test Date:  3/24/2023    Patient: Ismael Perez : 1950 Physician: Mckayla Grimes MD   Sex: Female AGE: 72 year Ref Phys: Carmen Centeno MD   ID#: [id number]   Technician: Familia Esqueda     Clinical Information:  Nadira Perez is a 73 yo female with h/o left breast cancer, lumbar spondylosis s/p spinal fusion and known h/o peripheral neuropathy who presents with worsening paresthesia and pain in her bilateral lower extremities and imbalance. Query comparison to the prior study in 10/2021.    Techniques:  Motor and sensory conduction studies were done with surface recording electrodes. EMG was done with a concentric needle electrode.     Results:  Evaluation of the left Dp Branch Fibular (TA) motor and the right Dp Branch Fibular (TA) motor nerves showed prolonged distal onset latency (Pop Fossa, L6.2, R6.5 ms) and reduced amplitude.  The left Fibular (EDB) motor, the left Tibial (AHB) motor, the right Tibial (AHB) motor, the right median sensory, and the right radial sensory nerves showed reduced amplitude (L0.77, L0.76, R0.92, R3, R3, R11  V).  The right Fibular (EDB) motor nerve showed no response (Ankle), no response (Bel Fib Head), and no response (Pop Fossa).  The right Median (APB) motor nerve showed reduced amplitude (Wrist, 4.9 mV) and reduced amplitude (Elbow, 3.8 mV).  The left Superficial Fibular sensory, the right Superficial Fibular sensory, the left sural sensory, and the right sural sensory nerves showed no response.      Needle evaluation of the right Tibialis Anterior muscle showed very increased Fibs/PSW, moderately increased motor unit amplitude, moderately increased motor unit duration, and moderately reduced recruitment.  The right Gastrocnemius and the right  Tensor Fasciae Latae muscles showed increased Fibs/PSW, slightly increased motor unit amplitude, slightly increased motor unit duration, and reduced recruitment.  The right Vastus Lateralis muscle showed slightly increased motor unit duration, slightly increased polyphasic potentials, and reduced recruitment.  The left Tibialis Anterior muscle showed very increased Fibs/PSW, slightly increased motor unit amplitude, slightly increased motor unit duration, and reduced recruitment.  The left Gastrocnemius muscle showed moderately increased Fibs/PSW, slightly increased motor unit amplitude, slightly increased motor unit duration, and reduced recruitment.  The left Tensor Fasciae Latae muscle showed slightly increased motor unit amplitude, slightly increased motor unit duration, and reduced recruitment.  All remaining muscles (as indicated in the following table) showed no evidence of electrical instability.        Interpretation:  Abnormal study.    --There is electrodiagnostic evidence of a moderate to severe, length dependent, sensorimotor, axonal polyneuropathy, with slight progression compared to the previous EMG in 2021.    --There is electrodiagnostic evidence of a mild chronic bilateral L5 radiculopathy.    ___________________________  Mckayla Grimes MD        Nerve Conduction Studies  Motor Sites      Latency Amplitude Neg. Amp Diff Segment Distance Velocity Neg. Dur Neg Area Diff Temperature Comment   Site (ms) Norm (mV) Norm %  cm m/s Norm ms %  C    Left Dp Branch Fibular (TA) Motor   Fib Head 4.2  < 6.0 1.54 -      7.5  32.9    Pop Fossa 6.2  < 5.7 1.70 - 10.4 Pop Fossa-Fib Head 11 55 - 6.6 -1.41 32.8    Right Dp Branch Fibular (TA) Motor   Fib Head 4.4  < 6.0 0.57 -      6.1  33.1    Pop Fossa 6.5  < 5.7 0.36 - -36.8 Pop Fossa-Fib Head 9 43 - 5.5 -53.2 33.1    Left Fibular (EDB) Motor   Ankle 3.9  < 6.0 0.77  > 2.0  Ankle-EDB 8   6.0  33.1    Bel Fib Head 12.8 - 0.90 - 16.9 Bel Fib Head-Ankle 34 38  > 38  5.0 27.9 33.2    Pop Fossa 15.0 - 0.95 - 5.6 Pop Fossa-Bel Fib Head 8.5 39  > 38 5.1 6.6 33.2    Right Fibular (EDB) Motor   Ankle NR  < 6.0 NR  > 2.0  Ankle-EDB 8   NR  33    Bel Fib Head NR - NR - NR Bel Fib Head-Ankle 33 NR  > 38 NR NR 33.1    Pop Fossa NR - NR - NR Pop Fossa-Bel Fib Head 11.5 NR  > 38 NR NR 33.1    Right Median (APB) Motor   Wrist 3.2  < 4.4 4.9  > 5.0  Wrist-APB 8   9.0  33.4    Elbow 8.4 - 3.8  > 5.0 -22.4 Elbow-Wrist 25.2 48  > 48 8.3 -28.0 33.4    Left Tibial (AHB) Motor   Ankle 4.5  < 6.5 0.76  > 5.0  Ankle-AHB 8   5.7  32.9    Knee 15.5 - 0.35 - -53.9 Knee-Ankle 42 38  > 38 11.7 -32.3 32.9    Right Tibial (AHB) Motor   Ankle 5.6  < 6.5 0.92  > 5.0  Ankle-AHB 8   4.4  33    Knee 16.9 - 0.39 - -57.6 Knee-Ankle 46 41  > 38 7.4 -54.6 33      Sensory Sites      Onset Lat Peak Lat Amp (O-P) Amp (P-P) Segment Distance Velocity Temperature Comment   Site ms ms  V Norm  V  cm m/s Norm  C    Right Median Sensory   Wrist-Dig II 2.9 3.7 3  > 10 3 Wrist-Dig II 14 48  > 48 33.7    Right Radial Sensory   Forearm-Wrist 1.58 2.2 11  > 15 14 Forearm-Wrist 10 63 - 33.5    Left Superficial Fibular Sensory   14 cm-Ankle NR NR NR  > 3 NR 14 cm-Ankle 12.5 NR  > 38 33.2    Right Superficial Fibular Sensory   14 cm-Ankle NR NR NR  > 3 NR 14 cm-Ankle 12.5 NR  > 38 32.6    Left Sural Sensory   Calf-Lat Mall NR NR NR  > 5 NR Calf-Lat Mall 14 NR  > 38 33.1    Right Sural Sensory   Calf-Lat Mall NR NR NR  > 5 NR Calf-Lat Mall 14 NR  > 38 32.6        Electromyography     Side Muscle Ins Act Fibs/PSW Fasc HF Amp Dur Poly Recrt Int Pat   Right Tib Anterior Nml 3+ Nml 0 2+ 2+ 0 ModRed Nml   Right Gastroc Nml 1+ Nml 0 1+ 1+ 0 Adriana Nml   Right Vastus Lat Nml None Nml 0 Nml 1+ 1+ Adriana Nml   Left Tib Anterior Nml 3+ Nml 0 1+ 1+ 0 Adriana Nml   Left Gastroc Nml 2+ Nml 0 1+ 1+ 0 Adriana Nml   Left Vastus Lat Nml None Nml 0 Nml Nml 0 Nml Nml   Right Biceps Fem SH Nml None Nml 0 Nml Nml 0 Nml Nml   Right TensFascLat Nml 1+  Nml 0 1+ 1+ 0 Adriana Nml   Left Biceps Fem SH Nml None Nml 0 Nml Nml 0 Nml Nml   Left TensFascLat Nml None Nml 0 1+ 1+ 0 Adriana Nml         NCS Waveforms:    Motor                         Sensory

## 2023-03-24 NOTE — LETTER
3/24/2023       RE: Nadira Perez  [id number]  [place] [address]     Dear Colleague,    Thank you for referring your patient, Nadira Perez, to the [facility] EMG CLINIC [place] at [facility]. Please see a copy of my visit note below.                          [facility]  Electrodiagnostic Laboratory                 Department of Neurology                                                                                                         Test Date:  3/24/2023    Patient: Ismael Perez : 1950 Physician: Mckayla Grimes MD   Sex: Female AGE: 72 year Ref Phys: Carmen Centeno MD   ID#: [id number]   Technician: Familia Esqueda     Clinical Information:  Nadira Perez is a 71 yo female with h/o left breast cancer, lumbar spondylosis s/p spinal fusion and known h/o peripheral neuropathy who presents with worsening paresthesia and pain in her bilateral lower extremities and imbalance. Query comparison to the prior study in 10/2021.    Techniques:  Motor and sensory conduction studies were done with surface recording electrodes. EMG was done with a concentric needle electrode.     Results:  Evaluation of the left Dp Branch Fibular (TA) motor and the right Dp Branch Fibular (TA) motor nerves showed prolonged distal onset latency (Pop Fossa, L6.2, R6.5 ms) and reduced amplitude.  The left Fibular (EDB) motor, the left Tibial (AHB) motor, the right Tibial (AHB) motor, the right median sensory, and the right radial sensory nerves showed reduced amplitude (L0.77, L0.76, R0.92, R3, R3, R11  V).  The right Fibular (EDB) motor nerve showed no response (Ankle), no response (Bel Fib Head), and no response (Pop Fossa).  The right Median (APB) motor nerve showed reduced amplitude (Wrist, 4.9 mV) and reduced amplitude (Elbow, 3.8 mV).  The left Superficial Fibular sensory, the right Superficial Fibular sensory, the left sural  sensory, and the right sural sensory nerves showed no response.      Needle evaluation of the right Tibialis Anterior muscle showed very increased Fibs/PSW, moderately increased motor unit amplitude, moderately increased motor unit duration, and moderately reduced recruitment.  The right Gastrocnemius and the right Tensor Fasciae Latae muscles showed increased Fibs/PSW, slightly increased motor unit amplitude, slightly increased motor unit duration, and reduced recruitment.  The right Vastus Lateralis muscle showed slightly increased motor unit duration, slightly increased polyphasic potentials, and reduced recruitment.  The left Tibialis Anterior muscle showed very increased Fibs/PSW, slightly increased motor unit amplitude, slightly increased motor unit duration, and reduced recruitment.  The left Gastrocnemius muscle showed moderately increased Fibs/PSW, slightly increased motor unit amplitude, slightly increased motor unit duration, and reduced recruitment.  The left Tensor Fasciae Latae muscle showed slightly increased motor unit amplitude, slightly increased motor unit duration, and reduced recruitment.  All remaining muscles (as indicated in the following table) showed no evidence of electrical instability.        Interpretation:  Abnormal study.    --There is electrodiagnostic evidence of a moderate to severe, length dependent, sensorimotor, axonal polyneuropathy, with slight progression compared to the previous EMG in 2021.    --There is electrodiagnostic evidence of a mild chronic bilateral L5 radiculopathy.    ___________________________  Mckayla Grimes MD        Nerve Conduction Studies  Motor Sites      Latency Amplitude Neg. Amp Diff Segment Distance Velocity Neg. Dur Neg Area Diff Temperature Comment   Site (ms) Norm (mV) Norm %  cm m/s Norm ms %  C    Left Dp Branch Fibular (TA) Motor   Fib Head 4.2  < 6.0 1.54 -      7.5  32.9    Pop Fossa 6.2  < 5.7 1.70 - 10.4 Pop Fossa-Fib Head 11 55 - 6.6 -1.41  32.8    Right Dp Branch Fibular (TA) Motor   Fib Head 4.4  < 6.0 0.57 -      6.1  33.1    Pop Fossa 6.5  < 5.7 0.36 - -36.8 Pop Fossa-Fib Head 9 43 - 5.5 -53.2 33.1    Left Fibular (EDB) Motor   Ankle 3.9  < 6.0 0.77  > 2.0  Ankle-EDB 8   6.0  33.1    Bel Fib Head 12.8 - 0.90 - 16.9 Bel Fib Head-Ankle 34 38  > 38 5.0 27.9 33.2    Pop Fossa 15.0 - 0.95 - 5.6 Pop Fossa-Bel Fib Head 8.5 39  > 38 5.1 6.6 33.2    Right Fibular (EDB) Motor   Ankle NR  < 6.0 NR  > 2.0  Ankle-EDB 8   NR  33    Bel Fib Head NR - NR - NR Bel Fib Head-Ankle 33 NR  > 38 NR NR 33.1    Pop Fossa NR - NR - NR Pop Fossa-Bel Fib Head 11.5 NR  > 38 NR NR 33.1    Right Median (APB) Motor   Wrist 3.2  < 4.4 4.9  > 5.0  Wrist-APB 8   9.0  33.4    Elbow 8.4 - 3.8  > 5.0 -22.4 Elbow-Wrist 25.2 48  > 48 8.3 -28.0 33.4    Left Tibial (AHB) Motor   Ankle 4.5  < 6.5 0.76  > 5.0  Ankle-AHB 8   5.7  32.9    Knee 15.5 - 0.35 - -53.9 Knee-Ankle 42 38  > 38 11.7 -32.3 32.9    Right Tibial (AHB) Motor   Ankle 5.6  < 6.5 0.92  > 5.0  Ankle-AHB 8   4.4  33    Knee 16.9 - 0.39 - -57.6 Knee-Ankle 46 41  > 38 7.4 -54.6 33      Sensory Sites      Onset Lat Peak Lat Amp (O-P) Amp (P-P) Segment Distance Velocity Temperature Comment   Site ms ms  V Norm  V  cm m/s Norm  C    Right Median Sensory   Wrist-Dig II 2.9 3.7 3  > 10 3 Wrist-Dig II 14 48  > 48 33.7    Right Radial Sensory   Forearm-Wrist 1.58 2.2 11  > 15 14 Forearm-Wrist 10 63 - 33.5    Left Superficial Fibular Sensory   14 cm-Ankle NR NR NR  > 3 NR 14 cm-Ankle 12.5 NR  > 38 33.2    Right Superficial Fibular Sensory   14 cm-Ankle NR NR NR  > 3 NR 14 cm-Ankle 12.5 NR  > 38 32.6    Left Sural Sensory   Calf-Lat Mall NR NR NR  > 5 NR Calf-Lat Mall 14 NR  > 38 33.1    Right Sural Sensory   Calf-Lat Mall NR NR NR  > 5 NR Calf-Lat Mall 14 NR  > 38 32.6        Electromyography     Side Muscle Ins Act Fibs/PSW Fasc HF Amp Dur Poly Recrt Int Pat   Right Tib Anterior Nml 3+ Nml 0 2+ 2+ 0 ModRed Nml   Right Gastroc Nml 1+  Nml 0 1+ 1+ 0 Adriana Nml   Right Vastus Lat Nml None Nml 0 Nml 1+ 1+ Adriana Nml   Left Tib Anterior Nml 3+ Nml 0 1+ 1+ 0 Adriana Nml   Left Gastroc Nml 2+ Nml 0 1+ 1+ 0 Adriana Nml   Left Vastus Lat Nml None Nml 0 Nml Nml 0 Nml Nml   Right Biceps Fem SH Nml None Nml 0 Nml Nml 0 Nml Nml   Right TensFascLat Nml 1+ Nml 0 1+ 1+ 0 Adriana Nml   Left Biceps Fem SH Nml None Nml 0 Nml Nml 0 Nml Nml   Left TensFascLat Nml None Nml 0 1+ 1+ 0 Adriana Nml         NCS Waveforms:    Motor                         Sensory                                Sincerely,    Mckayla Grimes MD

## 2023-03-28 DIAGNOSIS — M51.369 DDD (DEGENERATIVE DISC DISEASE), LUMBAR: ICD-10-CM

## 2023-03-28 DIAGNOSIS — M48.062 SPINAL STENOSIS OF LUMBAR REGION WITH NEUROGENIC CLAUDICATION: ICD-10-CM

## 2023-03-28 RX ORDER — HYDROCODONE BITARTRATE AND ACETAMINOPHEN 5; 325 MG/1; MG/1
1 TABLET ORAL EVERY 6 HOURS PRN
Qty: 30 TABLET | Status: CANCELLED | OUTPATIENT
Start: 2023-03-28

## 2023-03-28 RX ORDER — HYDROCODONE BITARTRATE AND ACETAMINOPHEN 5; 325 MG/1; MG/1
1 TABLET ORAL EVERY 6 HOURS PRN
Qty: 30 TABLET | Refills: 0 | Status: CANCELLED | OUTPATIENT
Start: 2023-03-28

## 2023-03-28 NOTE — TELEPHONE ENCOUNTER
HYDROcodone-acetaminophen (NORCO) 5-325 MG tablet  Last Written Prescription Date:   9/27/2022  Last Fill Quantity: 30,   # refills: 0  Last Office Visit :  10/27/2022  Future Office visit:  None    Routing refill request to provider for review/approval because:  Drug not on the [facility], [facility] or [facility] refill protocol or controlled substance      Cora Jones RN  Central Triage Red Flags/Med Refills

## 2023-03-29 NOTE — TELEPHONE ENCOUNTER
MC message was sent.    Soon-Mi  ------------------------------------------------------------------------      She needs an appointment to discuss.     Matilde LONG, CNP

## 2023-03-30 ENCOUNTER — HOSPITAL ENCOUNTER (OUTPATIENT)
Dept: PHYSICAL THERAPY | Facility: CLINIC | Age: 73
Discharge: HOME | End: 2023-03-30
Payer: COMMERCIAL

## 2023-03-30 ENCOUNTER — MYC MEDICAL ADVICE (OUTPATIENT)
Dept: INTERNAL MEDICINE | Facility: CLINIC | Age: 73
End: 2023-03-30

## 2023-03-30 ENCOUNTER — VIRTUAL VISIT (OUTPATIENT)
Dept: INTERNAL MEDICINE | Facility: CLINIC | Age: 73
End: 2023-03-30
Payer: COMMERCIAL

## 2023-03-30 DIAGNOSIS — I89.0 LYMPHEDEMA OF LEFT UPPER EXTREMITY: Primary | ICD-10-CM

## 2023-03-30 DIAGNOSIS — R53.81 PHYSICAL DECONDITIONING: ICD-10-CM

## 2023-03-30 DIAGNOSIS — M48.062 SPINAL STENOSIS OF LUMBAR REGION WITH NEUROGENIC CLAUDICATION: ICD-10-CM

## 2023-03-30 DIAGNOSIS — M51.369 DDD (DEGENERATIVE DISC DISEASE), LUMBAR: ICD-10-CM

## 2023-03-30 DIAGNOSIS — L90.5 SCAR CONDITION AND FIBROSIS OF SKIN: ICD-10-CM

## 2023-03-30 DIAGNOSIS — R26.89 IMPAIRMENT OF BALANCE: ICD-10-CM

## 2023-03-30 DIAGNOSIS — R53.1 DECREASED STRENGTH: ICD-10-CM

## 2023-03-30 DIAGNOSIS — Z91.81 H/O FALL: ICD-10-CM

## 2023-03-30 PROCEDURE — 97110 THERAPEUTIC EXERCISES: CPT | Mod: GP | Performed by: PHYSICAL THERAPIST

## 2023-03-30 PROCEDURE — 99213 OFFICE O/P EST LOW 20 MIN: CPT | Mod: VID | Performed by: NURSE PRACTITIONER

## 2023-03-30 PROCEDURE — 97140 MANUAL THERAPY 1/> REGIONS: CPT | Mod: GP | Performed by: PHYSICAL THERAPIST

## 2023-03-30 RX ORDER — HYDROCODONE BITARTRATE AND ACETAMINOPHEN 5; 325 MG/1; MG/1
TABLET ORAL
Qty: 30 TABLET | Refills: 0 | Status: SHIPPED | OUTPATIENT
Start: 2023-03-30 | End: 2023-07-02

## 2023-03-30 NOTE — PROGRESS NOTES
"Virtual Visit Details    Type of service:  Video Visit   Video Start Time: {video visit start/end time for provider to select:741565}  Video End Time:{video visit start/end time for provider to select:160588}    Originating Location (pt. Location): {video visit patient location:314900::\"Home\"}  {PROVIDER LOCATION On-site should be selected for visits conducted from your clinic location or adjoining [facility] hospital, academic office, or other nearby [facility] building. Off-site should be selected for all other provider locations, including home:765014}  Distant Location (provider location):  {virtual location provider:492941}  Platform used for Video Visit: {Virtual Visit Platforms:929660::\"[employer]\"}          "

## 2023-03-30 NOTE — PROGRESS NOTES
[facility]    OUTPATIENT PHYSICAL THERAPY  PLAN OF TREATMENT FOR OUTPATIENT REHABILITATION AND PROGRESS NOTE           Patient's Last Name, First Name, Nadira Meyers Date of Birth  1950   Provider's Name  [facility] Medical Record No.  [id number]    Onset Date  11/15/2022 (date of order; exacerbation 5/1/2022 following L finger fractures and limited ability to perform lymphedema home progam) Start of Care Date  1/5/2023   Type:     _X_PT   ___OT   ___SLP Medical Diagnosis  lymphedema   PT Diagnosis  Lymphedema, fibrosis, impaired balance with h/o falls, decreased strength Plan of Treatment  Frequency/Duration: 2x over 90 days  Certification date from 3/30/2023 to 6/28/2023     Goals:  Goal Identifier Home program   Goal Description Patient will be independent in home program to manage conditions of lymphedema and fibrosis as well as impaired balance and h/o falls.   Target Date 06/28/23   Date Met      Progress (detail required for progress note):       Goal Identifier volume L UE   Goal Description Patient will demonstrate decreased volume of L UE by 5% to decrease risk of infection.   Target Date 03/06/23   Date Met  02/15/23   Progress (detail required for progress note): MET:  decreased 9% but L UE remains 10.3% greater than R UE     Goal Identifier LLIS   Goal Description Patient will demonstrate an 5 point decrease in LLIS to demonstrate a decreased impact of lymphedema on function.   Target Date 05/25/23 (goal date extended as awaiting new garments)   Date Met      Progress (detail required for progress note):       Goal Identifier Balance   Goal Description The patient will be able to maintain rhomberg stance x 30 seconds while demonstrating a normal degree of sway to demonstrate improved postural stability.   Target Date 03/06/23   Date Met   "02/22/23   Progress (detail required for progress note): MET; x 30\"     Patient seen only 1 x since last progress note. Demonstrating further reduction in volume L UE:  L=4728ml (decreased 10.6% from initial volume and L>R=9%)  Patient is awaiting new comrepsssion sleeve and gauntlet to allow transition to wearing garments daily, performing bandgaging 1x/week x 24hrs and has yet to trial resuming comrpession pump ideally 1x/day but pt stating she wishes to trial 3x/week initally.         I CERTIFY THE NEED FOR THESE SERVICES FURNISHED UNDER        THIS PLAN OF TREATMENT AND WHILE UNDER MY CARE     (Physician co-signature of this document indicates review and certification of the therapy plan).                Referring Provider: SAIDA Nicole, PT    "

## 2023-03-30 NOTE — NURSING NOTE
Is the patient currently in the state of [place]? YES    Visit mode:VIDEO    If the visit is dropped, the patient can be reconnected by: VIDEO VISIT: Send to e-mail at: [email or web address]    Will anyone else be joining the visit? NO      How would you like to obtain your AVS? MyChart    Are changes needed to the allergy or medication list? NO    Reason for visit: Discuss medication refills, pain management, and pt would like a TENS unit    Shira MCNEAL

## 2023-03-30 NOTE — PATIENT INSTRUCTIONS
Thank you for visiting the Primary Care Center today at the [facility]! The following is some information about our clinic:     Primary Care Center Frequently-Asked Questions    (1) How do I schedule appointments at the [facility]?     Primary Care--to schedule or make changes to an existing appointment, please call our primary care line at [phone].    Labs--to schedule a lab appointment at the [facility] you can use [employer] ([employer] or call [phone]. If you have a [place] location that is closer to home, you can reach out to that location for scheduling options.     Imaging--if you need to schedule a CT, X-ray, MRI, ultrasound, or other imaging study you can call [phone] to schedule at the [facility] or any other [facility] imaging location.     Referrals--if a referral to another specialty was ordered you can expect a phone call from their scheduling team. If you have not heard from them in a week, please call us or send us a [employer] ([employer]) message to check the status or get a scheduling number. Please note that this only applies to internal [facility] referrals. If the referral is external you would need to contact their office for scheduling.     (2) I have a question about my visit, who do I contact?     You can call us at the primary care line at [phone] to ask questions about your visit. You can also send a secure message through [employer] ([employer]), which is reviewed by clinic staff. Please note that [employer] messages have a twenty-four to forty-eight business hour turnaround time and should not be used for urgent concerns.    (3) How will I get the results of my tests?    If you are signed up for [employer] all tests will be released to you within twenty-four hours of resulting. Please allow three to five days for your doctor to review your results and place a note interpreting the results. If you do not have [employer] you will receive your  results through mail seven to ten business days following the return of the tests. Please note that if there should be any urgent or concerning results that your doctor or their registered nurse will reach out to you the same day as the tests come back. If you have follow up questions about your results or would like to discuss the results in detail please schedule a follow up with your provider either in person or virtually.     (4) How do I get refills of my prescriptions?     You should always first contact your pharmacy for refills of your medications. If submitting a refill request on [employer], please be sure to submit the request only once--repeat requests can cause delays in refill. If you are requesting a NEW medication or a medication related to new symptoms you will need to schedule an appointment with a provider prior to approval. Please note: Routine medication refills have up to one to three business day turnaround whereas controlled substances refills have up to five to seven business day turnaround.    (5) I have new symptoms, what do I do?     If you are having an immediate medical emergency, you should dial 911 for assistance.   For anything urgent that needs to be seen within a few hours to one day you should visit a local urgent care for assistance.  For non-urgent symptoms that need to be seen within a few days to a week you can schedule with an available provider in primary care by going to [employer] or calling [phone].   If you are not sure how serious your symptoms are or you would like to receive medical advice you can always call [phone] to speak with a triage nurse.

## 2023-03-31 ENCOUNTER — VIRTUAL VISIT (OUTPATIENT)
Dept: PHARMACY | Facility: CLINIC | Age: 73
End: 2023-03-31
Payer: COMMERCIAL

## 2023-03-31 DIAGNOSIS — E03.9 HYPOTHYROIDISM, UNSPECIFIED TYPE: ICD-10-CM

## 2023-03-31 DIAGNOSIS — M85.80 OSTEOPENIA, UNSPECIFIED LOCATION: ICD-10-CM

## 2023-03-31 DIAGNOSIS — R52 PAIN: ICD-10-CM

## 2023-03-31 DIAGNOSIS — G47.00 INSOMNIA, UNSPECIFIED TYPE: ICD-10-CM

## 2023-03-31 DIAGNOSIS — N89.8 VAGINAL DRYNESS: ICD-10-CM

## 2023-03-31 DIAGNOSIS — Z78.9 TAKES DIETARY SUPPLEMENTS: ICD-10-CM

## 2023-03-31 DIAGNOSIS — E78.00 PURE HYPERCHOLESTEROLEMIA: ICD-10-CM

## 2023-03-31 DIAGNOSIS — K59.00 CONSTIPATION, UNSPECIFIED CONSTIPATION TYPE: ICD-10-CM

## 2023-03-31 DIAGNOSIS — I10 BENIGN ESSENTIAL HYPERTENSION: Primary | ICD-10-CM

## 2023-03-31 DIAGNOSIS — R11.0 NAUSEA: ICD-10-CM

## 2023-03-31 PROCEDURE — 99607 MTMS BY PHARM ADDL 15 MIN: CPT | Mod: VID | Performed by: PHARMACIST

## 2023-03-31 PROCEDURE — 99605 MTMS BY PHARM NP 15 MIN: CPT | Mod: VID | Performed by: PHARMACIST

## 2023-03-31 RX ORDER — IBUPROFEN 200 MG
400 TABLET ORAL 3 TIMES DAILY PRN
COMMUNITY
End: 2023-04-28

## 2023-03-31 NOTE — PROGRESS NOTES
Nadira Perez is a 72 year old female year old who is being evaluated via a billable phone visit.      How would you like to obtain your AVS? MyChart  If the video visit is dropped, the invitation should be resent by: Text to cell phone: [phone].  Will anyone else be joining your video visit? No      Time: 7:02      Subjective   Nadira Perez who presents for the following health issues: she is requesting refills for Norco.    HPI   Ismael is calling for refills for Norco which she takes approximately twice a week.  She takes this for her low back pain usually late in the day.  She has a hx of spinal stenosis. Her script of thirty tabs has been lasting her for 3 months.  She contiinues her gabapentin.     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     Corneal opacity     Hypothyroidism     Osteopenia, unspecified location     Aromatase inhibitor use     Chronic pain of right knee     DDD (degenerative disc disease), lumbar     Degenerative scoliosis in adult patient     Carpal tunnel syndrome of right wrist     Peripheral neuropathy     Spinal stenosis of lumbar region with neurogenic claudication     Spondylolisthesis of lumbar region     Brain lesion     Dermatochalasis of both upper eyelids     S/P spinal fusion     Chronic bilateral low back pain     PVD (posterior vitreous detachment), left eye     Vitreous opacities of left eye     Closed nondisplaced fracture of lateral malleolus of left fibula,  initial encounter     Acute left ankle pain     Review of Systems   No new symptoms        Objective     Vitals: No vitals were obtained today due to virtual visit.    Physical Exam   This was a telephone visit.    PSYCH: Mentation appears normal.    Assessment and Plan  Nadira was seen today for video visit.    Diagnoses and all orders for this visit:    Spinal stenosis of lumbar region with neurogenic claudication  -     HYDROcodone-acetaminophen (NORCO) 5-325 MG tablet; Take 1 tab for breakthrough pain 1-2 times a week.  -     Miscellaneous Order for DME - ONLY FOR DME    DDD (degenerative disc disease), lumbar  -     HYDROcodone-acetaminophen (NORCO) 5-325 MG tablet; Take 1 tab for breakthrough pain 1-2 times a week.        Video-Visit Details    Type of service:  Video Visit    Video End Time :7:20pm    Originating Location (pt. Location): Home    Distant Location (provider location):  [facility] [place]     Platform used for phone Visit: [email or web address]    Total time spent today with this patient including chart review, exam time with patient and documentation : 20 minutes.  Matilde LONG, CNP

## 2023-03-31 NOTE — TELEPHONE ENCOUNTER
Patient completed video visit on 3/30/23.     Live VELEZ, EMT at 7:32 AM on 3/31/2023.  Primary Care Clinic: [phone]

## 2023-03-31 NOTE — PROGRESS NOTES
Medication Therapy Management (MTM) Encounter    ASSESSMENT:                            Medication Adherence/Access: No issues identified    Hypertension: Patient's blood pressure has risen in the past month for unclear reasons. Ibuprofen can increase blood pressure and since her pain is under control she may benefit from working to decrease the dose of this.     Osteoporosis: Plan in place with sports medicine.      Supplements:  Stable.      Insomnia: Since patient does not have issues falling asleep she could consider moving hydroxyzine to as needed due to dry mouth.     Constipation: Stable.     Pain: See above in hypertension section about decreasing ibuprofen. Can also use methocarbamol three times a day as prescribed if needed with ibuprofen decrease.     Vaginal Dryness:  Stable.     Hyperlipidemia: Stable.     Nausea: Stable.     Hypothyroidism: Stable. Last TSH is within normal limits.       PLAN:                            1. Decrease ibuprofen to 400 mg three times a day. Continue to decrease dose of ibuprofen as tolerated.  2. I will send you a list of your medications through [employer].   3. Try using hydroxyzine just as needed.   4. Try taking methocarbamol three times a day instead of just at night to see if this is helpful for pain as we decrease ibuprofen.     Follow-up: Return in about 4 weeks (around 4/28/2023) for with me, Follow up, using a video visit.    SUBJECTIVE/OBJECTIVE:                          Nadira Perez is a 72 year old female called for a follow-up visit.  Today's visit is a follow-up MTM visit from 11/2/22     Reason for visit: CMR - hypertension management    Allergies/ADRs: Reviewed in chart  Past Medical History: Reviewed in chart  Tobacco: She reports that she has never smoked. She has never used smokeless tobacco.  Alcohol: none    Medication Adherence/Access: no issues reported    Hypertension: Current medications include metoprolol XL 50 mg at 10 AM, she takes the  amlodipine 10 mg in the evening, she takes lisinopril 40 mg at 800 am, and spironolactone 50 mg daily at 10 am.  She reports that its sometimes hard to get all three in. Patient does self-monitor blood pressure. Home BP monitoring in range of  140-150/90-100s mmHg. Reports pulse is usually 70-80's bpm. Lowest blood pressure recently was 107/79 mmHg yesterday. Sometimes she will forget her amlodipine at night but even when she takes it the blood pressure is still high in the morning.     Reports that these increased blood pressures have happened over the past month. Reports no changes in her diet.      BP Readings from Last 3 Encounters:   01/13/23 131/86   11/02/22 118/77   10/27/22 130/74       Pulse Readings from Last 3 Encounters:   01/13/23 96   11/02/22 69   10/27/22 76       Potassium   Date Value Ref Range Status   01/11/2023 4.3 3.4 - 5.3 mmol/L Final   07/16/2022 4.3 3.4 - 5.3 mmol/L Final   11/18/2021 2.6 (LL) 3.4 - 5.3 mmol/L Final   05/07/2021 3.2 (L) 3.4 - 5.3 mmol/L Final   05/07/2021 3.3 (L) 3.4 - 5.3 mmol/L Final       Osteoporosis: Currently taking Evenity 210 mg once a month. Reports she is getting her last injection last month. She previously had experienced fractures with falls.      Supplements: Currently taking vitamin D 6000 units daily, multivitamin daily, cranberry 650 mg daily, and vitamin C 500 mg daily. No concerns or questions at this time.      Vitamin D Deficiency Screening Results:  Lab Results   Component Value Date    VITDT 56 10/27/2022    VITDT 43 05/04/2022    VITDT 87 (H) 01/12/2022    VITDT 69 05/07/2021    VITDT 56 05/12/2020        Insomnia: Currently taking hydroxyzine 25 mg at bedtime, magnesium 250 mg nightly,  and 10 mg of melatonin. Has been waking up around 4-5 hours alter and when she goes back to sleep she doesn't take anything to get back to sleep. Not experiencing side effects from this.     Constipation: Currently taking sennakot-s once or twice a month. No  concerns or questions at this time.     Pain: Currently taking 600 mg of ibuprofen three times a day and acetaminophen is 1000 mg three times a day, hydrocodone-acetaminophen 5-325 mg for breakthrough pain 1-2 times per week ((has a naloxone just in case).. Reports that pain has been a lot better since increasing the gabapentin to 1200 mg three times a day. Hasnt been using voltaren gel much because she is taking the ibuprofen. Takes the methocarbamol more at night vs three times a day. Hasnt really tried to take the methocarbamol during the day. Reports she has been active and her pain is doing well.     Vaginal Dryness: Currently taking vaginal lubricant gel 2g every 3 days. Works well. No concerns or questions at this time.     Hyperlipidemia: Current therapy includes atorvastatin 80mg daily.  Patient reports no significant myalgias or other side effects.  The 10-year ASCVD risk score (Nicole THAKKAR, et al., 2019) is: 12%    Values used to calculate the score:      Age: 72 years      Sex: Female      Is Non- : No      Diabetic: No      Tobacco smoker: No      Systolic Blood Pressure: 131 mmHg      Is BP treated: Yes      HDL Cholesterol: 67 mg/dL      Total Cholesterol: 167 mg/dL  Recent Labs   Lab Test 04/15/22  1632 05/07/21  1234 10/27/16  1121 10/30/15  1221   CHOL 167 217*   < > 182   HDL 67 60   < > 48*   LDL 71 111*   < > 102   TRIG 145 229*   < > 159*   CHOLHDLRATIO  --   --   --  3.8    < > = values in this interval not displayed.       Nausea: Currently taking ondansetron 4 mg ODT as needed. Reports that she hasnt used it more than once.     Hypothyroidism: Patient is taking Levothyroxine 56 mcg (half of a 112 mcg) daily. Patient is having the following symptoms: none.   TSH   Date Value Ref Range Status   10/27/2022 2.74 0.30 - 4.20 uIU/mL Final   01/12/2022 1.54 0.40 - 4.00 mU/L Final   11/11/2019 1.91 0.40 - 4.00 mU/L Final         Today's Vitals: There were no vitals taken for  this visit.  ----------------      I spent 38 minutes with this patient today. All changes were made via collaborative practice agreement with MERCEDES Kyle CNP. A copy of the visit note was provided to the patient's provider(s).    A summary of these recommendations was sent via [employer].    Willis So, Pharm. D., [facility]  Medication Therapy Management Pharmacist  Direct Voicemail: [phone]      Telemedicine Visit Details  Type of service:  Telephone visit  Start Time: 10:02 am  End Time: 10:40 am     Medication Therapy Recommendations  Insomnia, unspecified type    Current Medication: hydrOXYzine (VISTARIL) 25 MG capsule   Rationale: Undesirable effect - Adverse medication event - Safety   Recommendation: Decrease Frequency   Status: Accepted - no CPA Needed         Pain    Current Medication: ibuprofen (ADVIL/MOTRIN) 600 MG tablet (Discontinued)   Rationale: Undesirable effect - Adverse medication event - Safety   Recommendation: Decrease Dose   Status: Accepted - no CPA Needed          Current Medication: methocarbamol (ROBAXIN) 500 MG tablet   Rationale: Does not understand instructions - Adherence - Adherence   Recommendation: Provide Education   Status: Patient Agreed - Adherence/Education

## 2023-03-31 NOTE — LETTER
"Recommended To-Do List      Prepared on: Mar 31, 2023       You can get the best results from your medications by completing the items on this \"To-Do List.\"      Bring your To-Do List when you go to your doctor. And, share it with your family or caregivers.    My To-Do List:  What we talked about: What I should do:   An issue with your medication    Decrease how often you take hydrOXYzine (VISTARIL) to as needed use          What we talked about: What I should do:   An issue with your medication    Decrease your dosage of ibuprofen (ADVIL/MOTRIN) to 400 mg three times a day           What we talked about: What I should do:   The importance of taking your medication as intended    Education: can take methocarbamol up to three times a day if needed.            What we talked about: What I should do:                       "

## 2023-03-31 NOTE — PATIENT INSTRUCTIONS
"Recommendations from today's MTM visit:                                                    MTM (medication therapy management) is a service provided by a clinical pharmacist designed to help you get the most of out of your medicines.   Today we reviewed what your medicines are for, how to know if they are working, that your medicines are safe and how to make your medicine regimen as easy as possible.      1. Decrease ibuprofen to 400 mg three times a day. Continue to decrease dose of ibuprofen as tolerated.  2. I will send you a list of your medications through [employer].   3. Try using hydroxyzine just as needed.   4. Try taking methocarbamol three times a day instead of just at night to see if this is helpful for pain as we decrease ibuprofen.     Follow-up: Return in about 4 weeks (around 4/28/2023) for with me, Follow up, using a video visit.    It was great speaking with you today.  I value your experience and would be very thankful for your time in providing feedback in our clinic survey. In the next few days, you may receive an email or text message from [employer] with a link to a survey related to your  clinical pharmacist.\"     To schedule another MTM appointment, please call the clinic directly or you may call the MTM scheduling line at [phone] or toll-free at [phone].     My Clinical Pharmacist's contact information:                                                      Please feel free to contact me with any questions or concerns you have.      Willis So, Pharm. D., [facility]  Medication Therapy Management Pharmacist  Direct Voicemail: [phone]        Medication List            Accurate as of March 31, 2023 11:03 AM. If you have any questions, ask your nurse or doctor.                CHANGE how you take these medications          Morning Afternoon Evening Bedtime    hydrOXYzine 25 MG capsule  Also known as: VISTARIL  Take 1 capsule (25 mg) by mouth At Bedtime  What changed:   when to take " this  reasons to take this                     ibuprofen 200 MG tablet  Also known as: ADVIL/MOTRIN  Take 400 mg by mouth 3 times daily as needed for pain  What changed: Another medication with the same name was removed. Continue taking this medication, and follow the directions you see here.  Changed by: Willis So [facility]                     methocarbamol 500 MG tablet  Also known as: ROBAXIN  Take 1 tablet (500 mg) by mouth 3 times daily  What changed:   when to take this  reasons to take this                     naloxone 4 MG/0.1ML nasal spray  Also known as: NARCAN  Spray 1 spray (4 mg) into one nostril alternating nostrils once as needed for opioid reversal every 2-3 minutes until assistance arrives  What changed: additional instructions                     senna-docusate 8.6-50 MG tablet  Also known as: SENOKOT-S/PERICOLACE  Take 2 tablets by mouth 2 times daily  What changed: additional instructions                           CONTINUE taking these medications          Morning Afternoon Evening Bedtime    acetaminophen 500 MG tablet  Also known as: TYLENOL  Take 500-1,000 mg by mouth every 6 hours as needed for mild pain                     amLODIPine 10 MG tablet  Also known as: NORVASC  Take 1 tablet (10 mg) by mouth daily                     atorvastatin 80 MG tablet  Also known as: Lipitor  Take 1 tablet (80 mg) by mouth daily                     CRANBERRY EXTRACT PO  Take 650 mg by mouth daily ~10 am  Takes 1                     diclofenac 1 % topical gel  Also known as: VOLTAREN  APPLY 4 GRAMS TO KNEES OR 2 GRAMS TO HANDS FOUR TIMES DAILY USING ENCLOSED DOSING CARD.                     gabapentin 300 MG capsule  Also known as: NEURONTIN  Take 4 capsules (1,200 mg) by mouth 3 times daily                     HYDROcodone-acetaminophen 5-325 MG tablet  Also known as: NORCO  Take 1 tab for breakthrough pain 1-2 times a week.                     levothyroxine 112 MCG tablet  Also known as:  SYNTHROID/LEVOTHROID  TAKE 1/2 TABLET BY MOUTH DAILY                     lisinopril 40 MG tablet  Also known as: ZESTRIL  Take 1 tablet (40 mg) by mouth daily                     magnesium 250 MG tablet  Take 1 tablet by mouth daily                     melatonin 5 MG tablet  Take 10 mg by mouth At Bedtime                     metoprolol succinate ER 50 MG 24 hr tablet  Also known as: TOPROL XL  Take 1 tablet (50 mg) by mouth daily                     Multi-vitamin tablet  Take 1 tablet by mouth daily  Generic drug: multivitamin w/minerals                     ondansetron 4 MG ODT tab  Also known as: ZOFRAN ODT  Take 1 tablet (4 mg) by mouth every 12 hours as needed for nausea                     RepHresh Gel  Place 2 g vaginally every 3 days                     spironolactone 25 MG tablet  Also known as: ALDACTONE  Take 2 tablets (50 mg) by mouth daily                     vitamin C 500 MG Chew  Take 1 tablet by mouth daily                     vitamin D3 50 MCG (2000 UT) Caps  TAKE 3 CAPSULES (6,000 UNITS) BY MOUTH DAILY.  Doctor's comments: DX Code Needed  .

## 2023-03-31 NOTE — LETTER
March 31, 2023  Nadira Perez  [address]  [place] [address]    Dear Ms. Perez, [facility]     Thank you for talking with me on Mar 31, 2023 about your health and medications. As a follow-up to our conversation, I have included two documents:      1. Your Recommended To-Do List has steps you should take to get the best results from your medications.  2. Your Medication List will help you keep track of your medications and how to take them.    If you want to talk about these documents, please call Willis So RPH at phone: [phone], Monday-Friday 8-4:30pm.    I look forward to working with you and your doctors to make sure your medications work well for you.    Sincerely,  Willis So RPH  [facility] Pharmacist, [facility]

## 2023-03-31 NOTE — LETTER
_  Medication List        Prepared on: Mar 31, 2023     Bring your Medication List when you go to the doctor, hospital, or   emergency room. And, share it with your family or caregivers.     Note any changes to how you take your medications.  Cross out medications when you no longer use them.    Medication How I take it Why I use it Prescriber   acetaminophen (TYLENOL) 500 MG tablet Take 500-1,000 mg by mouth every 6 hours as needed for mild pain Pain Patient Reported   amLODIPine (NORVASC) 10 MG tablet Take 1 tablet (10 mg) by mouth daily Benign Essential Hypertension MERCEDES Rivera CNP   Ascorbic Acid (VITAMIN C) 500 MG CHEW Take 1 tablet by mouth daily  Supplement Patient Reported   atorvastatin (LIPITOR) 80 MG tablet Take 1 tablet (80 mg) by mouth daily Pure Hypercholesterolemia MERCEDES Rivera CNP   Cholecalciferol (VITAMIN D3) 50 MCG (2000 UT) CAPS TAKE 3 CAPSULES (6,000 UNITS) BY MOUTH DAILY. Hypovitaminosis D MERCEDES Rivera CNP   CRANBERRY EXTRACT PO Take 650 mg by mouth daily ~10 am  Takes 1  UTI prevention Patient Reported   diclofenac (VOLTAREN) 1 % topical gel APPLY 4 GRAMS TO KNEES OR 2 GRAMS TO HANDS FOUR TIMES DAILY USING ENCLOSED DOSING CARD. Right knee pain, unspecified chronicity MERCEDES Rivera CNP   gabapentin (NEURONTIN) 300 MG capsule Take 4 capsules (1,200 mg) by mouth 3 times daily Neuropathic pain MERCEDES Rivera CNP   HYDROcodone-acetaminophen (NORCO) 5-325 MG tablet Take 1 tab for breakthrough pain 1-2 times a week. Spinal stenosis of lumbar region with neurogenic claudication; DDD (Degenerative Disc Disease), Lumbar MERCEDES Rivera CNP   hydrOXYzine (VISTARIL) 25 MG capsule Take 1 capsule (25 mg) by mouth At Bedtime Sleep Pattern Disturbance MERCEDES Rivera CNP   ibuprofen (ADVIL/MOTRIN) 600 MG tablet Take 1 tablet (600 mg) by mouth every 6 hours as needed for other (mild and/or inflammatory pain) Right carpal tunnel syndrome  sertraline (ZOLOFT) 100 MG tablet 90 tablet 0 7/6/2021     Sig - Route: TAKE 1 TABLET BY MOUTH DAILY - Oral      Last visit 8/20/2020  Wt Readings from Last 1 Encounters:   08/20/20 129.9 kg (286 lb 4.8 oz)        BP Readings from Last 2 Encounters:   08/20/20 134/78   01/28/20 150/84   ]    Lab Results   Component Value Date    SODIUM 140 08/12/2020    POTASSIUM 4.0 08/12/2020    CHLORIDE 109 (H) 08/12/2020    CO2 23 08/12/2020    BUN 14 08/12/2020    CREATININE 0.95 08/12/2020    GLUCOSE 93 08/12/2020     No results found for: HGBA1C  TSH (mcUnits/mL)   Date Value   08/12/2020 1.923     Lab Results   Component Value Date    CHOLESTEROL 209 (H) 08/12/2020    HDL 49 08/12/2020    CALCLDL 140 (H) 08/12/2020    TRIGLYCERIDE 101 08/12/2020     Lab Results   Component Value Date    AST 17 08/12/2020    GPT 30 08/12/2020    ALKPT 98 08/12/2020    BILIRUBIN 0.8 08/12/2020        Fidel Cassidy MD   levothyroxine (SYNTHROID/LEVOTHROID) 112 MCG tablet TAKE 1/2 TABLET BY MOUTH DAILY Hypothyroidism, unspecified type Matilde MARCELINO MERCEDES Quiñonez CNP   lisinopril (ZESTRIL) 40 MG tablet Take 1 tablet (40 mg) by mouth daily Benign Essential Hypertension Matilde M MERCEDES Quiñonez CNP   magnesium 250 MG tablet Take 1 tablet by mouth daily  Supplement Patient Reported   melatonin 5 MG tablet Take 10 mg by mouth At Bedtime   Sleep  Patient Reported   methocarbamol (ROBAXIN) 500 MG tablet Take 1 tablet (500 mg) by mouth 3 times daily Spinal stenosis of lumbar region with neurogenic claudication MERCEDES Rivera CNP   metoprolol succinate ER (TOPROL XL) 50 MG 24 hr tablet Take 1 tablet (50 mg) by mouth daily Benign Essential Hypertension Matilde M MERCEDES Quiñonez CNP   Multiple Vitamin (MULTI-VITAMIN) per tablet Take 1 tablet by mouth daily  Supplement Patient Reported   naloxone (NARCAN) 4 MG/0.1ML nasal spray Spray 1 spray (4 mg) into one nostril alternating nostrils once as needed for opioid reversal every 2-3 minutes until assistance arrives Dermatochalasis of both upper eyelids Jemal Sanchez MD   ondansetron (ZOFRAN-ODT) 4 MG ODT tab Take 1 tablet (4 mg) by mouth every 12 hours as needed for nausea Vertigo MERCEDES Rivera CNP   senna-docusate (SENOKOT-S/PERICOLACE) 8.6-50 MG tablet Take 2 tablets by mouth 2 times daily Spinal stenosis of lumbar region with neurogenic claudication MERCEDES Gibson CNS   spironolactone (ALDACTONE) 25 MG tablet Take 2 tablets (50 mg) by mouth daily Essential Hypertension, Benign MERCEDES Rivera CNP   Vaginal Lubricant (REPHRESH) GEL Place 2 g vaginally every 3 days  Vaginal Dryness MERCEDES Rivera CNP         Add new medications, over-the-counter drugs, herbals, vitamins, or  minerals in the blank rows below.    Medication How I take it Why I use it Prescriber                                      Allergies:      erythromycin;  penicillins        Side effects I have had:               Other Information:              My notes and questions:

## 2023-04-12 ENCOUNTER — OFFICE VISIT (OUTPATIENT)
Dept: ORTHOPEDICS | Facility: CLINIC | Age: 73
End: 2023-04-12
Payer: COMMERCIAL

## 2023-04-12 ENCOUNTER — LAB (OUTPATIENT)
Dept: LAB | Facility: CLINIC | Age: 73
End: 2023-04-12
Payer: COMMERCIAL

## 2023-04-12 DIAGNOSIS — M80.08XG PATHOLOGICAL FRACTURE OF VERTEBRA DUE TO AGE-RELATED OSTEOPOROSIS WITH DELAYED HEALING, SUBSEQUENT ENCOUNTER: ICD-10-CM

## 2023-04-12 DIAGNOSIS — S82.65XA CLOSED NONDISPLACED FRACTURE OF LATERAL MALLEOLUS OF LEFT FIBULA, INITIAL ENCOUNTER: ICD-10-CM

## 2023-04-12 DIAGNOSIS — M81.8 OTHER OSTEOPOROSIS WITHOUT CURRENT PATHOLOGICAL FRACTURE: ICD-10-CM

## 2023-04-12 DIAGNOSIS — M80.08XG PATHOLOGICAL FRACTURE OF VERTEBRA DUE TO AGE-RELATED OSTEOPOROSIS WITH DELAYED HEALING, SUBSEQUENT ENCOUNTER: Primary | ICD-10-CM

## 2023-04-12 LAB
CALCIUM SERPL-MCNC: 9.9 MG/DL (ref 8.8–10.2)
CREAT SERPL-MCNC: 0.82 MG/DL (ref 0.51–0.95)
GFR SERPL CREATININE-BSD FRML MDRD: 77 ML/MIN/1.73M2

## 2023-04-12 PROCEDURE — 82310 ASSAY OF CALCIUM: CPT | Performed by: PATHOLOGY

## 2023-04-12 PROCEDURE — 99207 PR DROP WITH A PROCEDURE: CPT | Performed by: FAMILY MEDICINE

## 2023-04-12 PROCEDURE — 36415 COLL VENOUS BLD VENIPUNCTURE: CPT | Performed by: PATHOLOGY

## 2023-04-12 PROCEDURE — 82565 ASSAY OF CREATININE: CPT | Performed by: PATHOLOGY

## 2023-04-12 PROCEDURE — 96372 THER/PROPH/DIAG INJ SC/IM: CPT | Performed by: FAMILY MEDICINE

## 2023-04-12 NOTE — PROGRESS NOTES
Assessment & Plan     Pathological fracture of vertebra due to age-related osteoporosis with delayed healing, subsequent encounter  Evenity #12  - romosozumab-aqqg (EVENITY) injection 210 mg  - Calcium; Future  - Creatinine; Future  - Dexa hip/pelvis/spine*; Future  - DX Wrist Heel Radius; Future    Closed nondisplaced fracture of lateral malleolus of left fibula, initial encounter      Other osteoporosis without current pathological fracture    - Dexa hip/pelvis/spine*; Future  - DX Wrist Heel Radius; Future    Prescription drug management         See Patient Instructions    No follow-ups on file.    Cortney Clark MD  [facility] [place]    Tom Guevara is a 72 year old, presenting for the following health issues:  No chief complaint on file.    HPI     Pt without side effects from injections.  Pt with back pain had surgery in 2021.  Questions about TENS unit and meeting with PMR on Friday.      Review of Systems   Constitutional, HEENT, cardiovascular, pulmonary, gi and gu systems are negative, except as otherwise noted.      Objective    There were no vitals taken for this visit.  There is no height or weight on file to calculate BMI.  Physical Exam   NAD  Nonlabored breathing                  Procedure: Evenity #12 Risks and benefits discussed and patient was consented.  Chloroprep used to clean the area of the skin and ethyl chloride to anesthetize the area.  1 injected each lateral thigh.  Band aids in place.  Well tolerated.

## 2023-04-12 NOTE — LETTER
4/12/2023      RE: Nadira Perez  [address]  [place] [address]     Dear Colleague,    Thank you for referring your patient, Nadira Perez, to the [facility]. Please see a copy of my visit note below.      Assessment & Plan     Pathological fracture of vertebra due to age-related osteoporosis with delayed healing, subsequent encounter  Evenity #12  - romosozumab-aqqg (EVENITY) injection 210 mg  - Calcium; Future  - Creatinine; Future  - Dexa hip/pelvis/spine*; Future  - DX Wrist Heel Radius; Future    Closed nondisplaced fracture of lateral malleolus of left fibula, initial encounter      Other osteoporosis without current pathological fracture    - Dexa hip/pelvis/spine*; Future  - DX Wrist Heel Radius; Future    Prescription drug management         See Patient Instructions    No follow-ups on file.    Cortney Clark MD  [facility]    Tom Guevara is a 72 year old, presenting for the following health issues:  No chief complaint on file.    HPI     Pt without side effects from injections.  Pt with back pain had surgery in 2021.  Questions about TENS unit and meeting with PMR on Friday.      Review of Systems   Constitutional, HEENT, cardiovascular, pulmonary, gi and gu systems are negative, except as otherwise noted.     Objective    There were no vitals taken for this visit.  There is no height or weight on file to calculate BMI.  Physical Exam   NAD  Nonlabored breathing                 Procedure: Evenity #12 Risks and benefits discussed and patient was consented.  Chloroprep used to clean the area of the skin and ethyl chloride to anesthetize the area.  1 injected each lateral thigh.  Band aids in place.  Well tolerated.      Again, thank you for allowing me to participate in the care of your patient.      Sincerely,    Cortney Clark MD

## 2023-04-12 NOTE — NURSING NOTE
[address] [address]  Dept: [phone]  ______________________________________________________________________________    Patient: Nadira Perez   : 1950   MRN: [id number]   2023    INVASIVE PROCEDURE SAFETY CHECKLIST    Date: 2023   Procedure: Subcutaneus evenity injection   Patient Name: Nadira Perez  MRN: [id number]  YOB: 1950    Action: Complete sections as appropriate. Any discrepancy results in a HARD COPY until resolved.     PRE PROCEDURE:  Patient ID verified with 2 identifiers (name and  or MRN): Yes  Procedure and site verified with patient/designee (when able): Yes  Accurate consent documentation in medical record: Yes  H&P (or appropriate assessment) documented in medical record: Yes  H&P must be up to 20 days prior to procedure and updates within 24 hours of procedure as applicable: NA  Relevant diagnostic and radiology test results appropriately labeled and displayed as applicable: Yes  Procedure site(s) marked with provider initials: NA    TIMEOUT:  Time-Out performed immediately prior to starting procedure, including verbal and active participation of all team members addressing the following:Yes  * Correct patient identify  * Confirmed that the correct side and site are marked  * An accurate procedure consent form  * Agreement on the procedure to be done  * Correct patient position  * Relevant images and results are properly labeled and appropriately displayed  * The need to administer antibiotics or fluids for irrigation purposes during the procedure as applicable   * Safety precautions based on patient history or medication use    DURING PROCEDURE: Verification of correct person, site, and procedures any time the responsibility for care of the patient is transferred to another member of the care team.       Prior to injection, verified patient identity using patient's name and  date of birth.  Due to injection administration, patient instructed to remain in clinic for 15 minutes  afterwards, and to report any adverse reaction to me immediately.    Evenity subcutaneus injection    Drug Amount Wasted:  None.  Vial/Syringe: Syringe  Expiration Date:  07/25      Fang Melara, [facility]  April 12, 2023

## 2023-04-12 NOTE — PATIENT INSTRUCTIONS
Calcium and Creatinine labs     DXA- will be done in 1 month    Please return to clinic after DXA has been read

## 2023-04-13 NOTE — PROGRESS NOTES
ACUPUNCTURIST TREATMENT NOTE      Nadira Perez, a 72 year old female, is here today for Follow - Up exam. Patient is referred by Anyiwe.    HPI  Main Complaint: Chronic low back pain: Over all she reports feeling improvement, but does still have pain. She has been weaning off Ibuprofen. Most often left sided, but sometimes right side also.    She has been doing pilates on [employer] and also riding stationary bike at gym. Feeling well.        Past Medical History  Past Medical History Reviewed: Yes   has a past medical history of Arthritis, Bone disease, Breast cancer (H), H/O kyphoplasty, Hearing problem, History of kidney stones, History of radiation therapy, Hyperlipemia, Hypertension, Hypopotassemia, Kidney problem, Lymph edema, Medullary sponge kidney, Osteopenia, PONV (postoperative nausea and vomiting), Reduced vision, Squamous cell skin cancer, and Thyroid disease.    Objective  Basic Exam Completed:   No    TCM Exam Completed: Yes   Limbs/Back: Lower Back    Tongue/Pulse Exam Completed: No    Patient Assessment    PEG: A Three-Item Scale Assessing Pain Intensity and Interference    What number best describes your PAIN ON AVERAGE in the past week? 5    What number best describes how, during the past week, pain has interfered with your ENJOYMENT OF LIFE? 4    What number best describes how, during the past week, pain has interfered with your GENERAL ACTIVITY? 6    PEG Total Score: 5    Marin EE, Rocky KA, Maxwell MJ, Eden COHEN, Jarvis J, Ken JM, Pooja SM, Jewel K. Development and initial validation of the PEG, a 3-item scale assessing pain intensity and interference. Journal of General Internal Medicine. 2009 Charlie;24:733-738.  Patient Type: Pain  Patient Complaint: chronic low back pain    Acupuncture 3/6/2023 4/18/2023   Intervention Reason Pain Pain   Pain Location low back low back   Pre-session Pain Rating 4 5   Post-session Pain Rating 0 3   Pain 2 Location - -   Pre-session Pain 2 Rating - -    Post-session Pain 2 Rating - -   Pain 3 Location - -   Pre-session Pain 3 Rating - -   Post-session Pain 3 Rating - -       TCM Diagnosis: Qi Stagnation;Blood Stagnation;Kidney Qi Deficiency      Treatment Principle: channel obstruction of Sreekanth Colon and Billy Colon; heart fire    TCM / Acupuncture Treatment  Acupuncture Points:       Initial insertions: Baihui, HTJJ L2-L5, Ub23, Ub31-32, Shiqizhuixia       Second insertions: yaoyan, Gb34, Ub57, Ub60, Ki3. Left: Pp73-Dy50, Lu7, Si6, Tb4, Tb5                    Accessory Techniques 3/6/2023 4/18/2023   Accessory Techniques TDP Heat Lamp; Tuina; Topicals TDP Heat Lamp; Tuina; Topicals   TDP Heat Lamp location used over low back low back   Tuina location used low back low back   Topicals Po Sum On Po Sum On   Topicals location used low back low back         8/8/2022    10:00 AM 11/3/2022     2:00 PM   Oswestry Disability Index (JOHN   Nish Weeks 1980, All rights reserved)   Section 1 - Pain intensity The pain is moderate at the moment. The pain is moderate at the moment.   Section 2 - Personal care (washing, dressing, etc.)  I need some help but manage most of my personal care. I can look after myself normally but it is very painful.   Section 3 - Lifting I can only lift very light weights. I can only lift very light weights.   Section 4 - Walking I can only walk using a cane or crutches. Pain prevents me from walking more than a quarter of a mile.   Section 5 - Sitting Pain prevents me from sitting for more than half an hour. Pain prevents me from sitting for more than 1 hour.   Section 6 - Standing Pain prevents me from standing for more than 10 minutes. Pain prevents me from standing for more than half an hour.   Section 7 - Sleeping Because of pain I have less than 4 hours sleep. Because of pain I have less than 6 hours sleep.   Section 8 - Sex life (if applicable) Not answered/NA Not answered/NA   Section 9 - Social life Pain has restricted my social life and I  do not go out as often. Pain has restricted my social life and I do not go out as often.   Section 10 - Traveling I can travel anywhere without pain. I can travel anywhere without pain.   Oswestry Score (%) 57.78 42.22       Assessment and Plan  Treatment Observations: Patient relaxed well during treatment  Acupuncture Treatment Recommendations: Diagnoses for treatment: Z98.1 (ICD-10-CM) - S/P spinal fusion  M54.50, G89.29 (ICD-10-CM) - Chronic low back pain, Other low back pain.  Additional Recommendations: Provided information on community acupuncture and [facility] student clinic  This service is performed and billed incident to Dr. Kamran Kelly.    It is my recommendation that this patient seek advice from their Primary Care Provider about active symptoms not addressed during this visit. The risks and benefits of acupuncture were reviewed and the patient stated understanding. The patient's questions were answered to their satisfaction. Consent was provided for treatment. We thank you for the referral and opportunity to treat this patient.    Time Spent with Patient:   I spent a total of 30 minutes face-to-face with Nadira Perez during today's office visit.     Zara Rosa L.Ac.  04/18/23

## 2023-04-14 ENCOUNTER — ONCOLOGY VISIT (OUTPATIENT)
Dept: ONCOLOGY | Facility: CLINIC | Age: 73
End: 2023-04-14
Attending: STUDENT IN AN ORGANIZED HEALTH CARE EDUCATION/TRAINING PROGRAM
Payer: COMMERCIAL

## 2023-04-14 VITALS
WEIGHT: 188 LBS | BODY MASS INDEX: 29.44 KG/M2 | DIASTOLIC BLOOD PRESSURE: 79 MMHG | SYSTOLIC BLOOD PRESSURE: 116 MMHG | OXYGEN SATURATION: 97 % | RESPIRATION RATE: 16 BRPM | HEART RATE: 80 BPM | TEMPERATURE: 98.8 F

## 2023-04-14 DIAGNOSIS — Z17.0 MALIGNANT NEOPLASM OF BREAST IN FEMALE, ESTROGEN RECEPTOR POSITIVE, UNSPECIFIED LATERALITY, UNSPECIFIED SITE OF BREAST (H): Primary | ICD-10-CM

## 2023-04-14 DIAGNOSIS — M21.371 RIGHT FOOT DROP: ICD-10-CM

## 2023-04-14 DIAGNOSIS — I89.0 LYMPHEDEMA: ICD-10-CM

## 2023-04-14 DIAGNOSIS — C50.919 MALIGNANT NEOPLASM OF BREAST IN FEMALE, ESTROGEN RECEPTOR POSITIVE, UNSPECIFIED LATERALITY, UNSPECIFIED SITE OF BREAST (H): Primary | ICD-10-CM

## 2023-04-14 DIAGNOSIS — G62.9 PERIPHERAL POLYNEUROPATHY: ICD-10-CM

## 2023-04-14 PROCEDURE — G0463 HOSPITAL OUTPT CLINIC VISIT: HCPCS | Performed by: STUDENT IN AN ORGANIZED HEALTH CARE EDUCATION/TRAINING PROGRAM

## 2023-04-14 PROCEDURE — 99215 OFFICE O/P EST HI 40 MIN: CPT | Performed by: STUDENT IN AN ORGANIZED HEALTH CARE EDUCATION/TRAINING PROGRAM

## 2023-04-14 ASSESSMENT — PAIN SCALES - GENERAL: PAINLEVEL: MODERATE PAIN (5)

## 2023-04-14 NOTE — NURSING NOTE
"Oncology Rooming Note    April 14, 2023 11:14 AM   Nadira Perez is a 72 year old female who presents for:    Chief Complaint   Patient presents with     Oncology Clinic Visit     Malignant neoplasm of breast in female, estrogen receptor positive, unspecified laterality, unspecified site of breast     Initial Vitals: /79   Pulse 80   Temp 98.8  F (37.1  C) (Oral)   Resp 16   Wt 85.3 kg (188 lb)   SpO2 97%   BMI 29.44 kg/m   Estimated body mass index is 29.44 kg/m  as calculated from the following:    Height as of 1/13/23: 1.702 m (5' 7\").    Weight as of this encounter: 85.3 kg (188 lb). Body surface area is 2.01 meters squared.  Moderate Pain (5) Comment: Data Unavailable   No LMP recorded. Patient has had a hysterectomy.  Allergies reviewed: Yes  Medications reviewed: Yes    Medications:Refill Tizanidine  Pharmacy name entered into [employer]:    [facility]  [facility] [id number] [place] [facility] [id number]    Clinical concerns: None      Sandra Elizalde            "

## 2023-04-14 NOTE — PATIENT INSTRUCTIONS
1.  Continue wearing your right AFO to help with safety during transfers and ambulation.  2.  Continue your home exercise regimen including your home lymphedema management regimen.  3.  Continue working with lymphedema therapy until discharge.  4.  A referral was placed to the pain management team today to discuss procedural options for your lumbar symptoms.  5.  Follow-up with Dr. Centeno in 4 months.

## 2023-04-14 NOTE — LETTER
4/14/2023         RE: Nadira Perez  [address]  [facility] [address]        Dear Colleague,    Thank you for referring your patient, Nadira Perez, to the [facility]. Please see a copy of my visit note below.    [facility]   PM&R clinic note        Interval history:     Nadira Perez presents to clinic today for follow up reg her rehab needs.   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  Recommendations included:  Work-up:  Labs- B6, B12, protein immunofixation/elecrophoresis  Repeat EMG  Therapy/equipment/braces:  Start physical therapy as planned  Orthotics referral for AFO for right foot drop  Medications:  Continue gabapentin at current dose.  Follow up: 3 months.    Oncology History:  -Diagnosis: 6/13/2007. 57 years old. Presented with left-sided breast discomfort which extended into the axilla. Clinical stage IIIC (T4d N3 M0), infiltrating ductal carcinoma, ER/AK positive, HER2-oneida negative. She had skin and areolar complex thickening consistent with inflammatory breast cancer.   -Neoadjuvant chemotherapy: She received FEC (Fluorouracil, Epirubicin, Cyclosphamide) for 3 cycles through 08/24/2007. This was followed by Taxotere for 3 cycles completed 10/26/2007. Estimated cumulative epirubicin dosing was 300 mg/m2 (900 mg/m2 is cumulative lifetime dose).   -Surgery: 11/20/2007 left-sided mastectomy with axillary lymph node dissection. 1 cm residual carcinoma with DCIS. 13/33 lymph nodes were positive, largest was 0.6 cm with extracapsular extension.   -Radiation: She received radiation to the left chest wall at a dose of 6440 cGy, to the left supraclavicular fossa at a dose of 5040 cGy, and to the left internal mammary chain at a dose of 5080 cGy.  Last dose of radiation was administered on 03/07/2008.       -Endocrine therapy: Arimidex  11//2007-11/2012, Tamoxifen 11/2012-11/2017, Arimidex 11/2017-11/2021. 14 years of endocrine therapy.   -Genetic testing: no  -Other information: s/p right sided prophylactic mastectomy 11/20/2007. Zometa every 6 months from 11/2017-11/2021 (4 years).   - Saw Ashley Perry  in Survivorship clinic on 11/15/22.  She fell and broke her 2nd right metatarsal, right fibula, and 4th finger, and thumb. She has chronic low back pain and had back surgery last year. She had a CT of her lumbar spine 5/2022 negative for metastatic disease. Peripheral neuropathy precedes cancer diagnosis, wanted referral to PM&R for evaluation of neuropathy. Seeing Neurology for dizziness, Brain MRI in 2020 negative, continues with lymphedema, wants a referral to PT.      Symptoms,  Ismael was seen for a return visit. She has been struggling with her left SI symptoms and is using a cane for shorter distances and the walker for longer distances.  She had her last avenity injection with Dr. Clark last week.   She continues with her lymphedema management for left arm. She has been doing her usually massage/stretch routine and continues wrapping per her own routine. She wraps two times per week and wraps at night but keeps it on during the day at times. She also has a sleeve which helps  She is using TENS, acupuncture for low back symptoms and legs.   She got her right AFO for the foot drop, and feels like it is helping her ambulation significantly.  She is still on gabapentin 1200 mg TID, and has been on this dose for a while.  She feels that it may be helping, she has not tried to wean down.      Therapies/HEP,  She continues with lymphedema therapy outpatient, and will be discharging soon.  She continues with her manual drainage techniques and daily stretches.      Functionally,   No changes in function.      Social history is unchanged.          Medications:  Current Outpatient Medications   Medication Sig Dispense Refill    acetaminophen (TYLENOL)  500 MG tablet Take 500-1,000 mg by mouth every 6 hours as needed for mild pain      amLODIPine (NORVASC) 10 MG tablet Take 1 tablet (10 mg) by mouth daily 90 tablet 3    Ascorbic Acid (VITAMIN C) 500 MG CHEW Take 1 tablet by mouth daily      atorvastatin (LIPITOR) 80 MG tablet Take 1 tablet (80 mg) by mouth daily 90 tablet 3    Cholecalciferol (VITAMIN D3) 50 MCG (2000 UT) CAPS TAKE 3 CAPSULES (6,000 UNITS) BY MOUTH DAILY. 270 capsule 3    CRANBERRY EXTRACT PO Take 650 mg by mouth daily ~10 am  Takes 1      diclofenac (VOLTAREN) 1 % topical gel APPLY 4 GRAMS TO KNEES OR 2 GRAMS TO HANDS FOUR TIMES DAILY USING ENCLOSED DOSING CARD. 100 g 3    gabapentin (NEURONTIN) 300 MG capsule Take 4 capsules (1,200 mg) by mouth 3 times daily 1080 capsule 3    HYDROcodone-acetaminophen (NORCO) 5-325 MG tablet Take 1 tab for breakthrough pain 1-2 times a week. 30 tablet 0    levothyroxine (SYNTHROID/LEVOTHROID) 112 MCG tablet TAKE 1/2 TABLET BY MOUTH DAILY 45 tablet 3    lisinopril (ZESTRIL) 40 MG tablet Take 1 tablet (40 mg) by mouth daily 90 tablet 3    magnesium 250 MG tablet Take 1 tablet by mouth daily      melatonin 5 MG tablet Take 10 mg by mouth At Bedtime       metoprolol succinate ER (TOPROL XL) 50 MG 24 hr tablet Take 1 tablet (50 mg) by mouth daily 90 tablet 3    Multiple Vitamin (MULTI-VITAMIN) per tablet Take 1 tablet by mouth daily      ondansetron (ZOFRAN-ODT) 4 MG ODT tab Take 1 tablet (4 mg) by mouth every 12 hours as needed for nausea 180 tablet 3    spironolactone (ALDACTONE) 25 MG tablet Take 2 tablets (50 mg) by mouth daily 180 tablet 3    Vaginal Lubricant (REPHRESH) GEL Place 2 g vaginally every 3 days 2 g 3    hydrOXYzine (VISTARIL) 25 MG capsule Take 1 capsule (25 mg) by mouth At Bedtime (Patient not taking: Reported on 4/14/2023) 90 capsule 3    ibuprofen (ADVIL/MOTRIN) 200 MG tablet Take 400 mg by mouth 3 times daily as needed for pain (Patient not taking: Reported on 4/14/2023)      methocarbamol  (ROBAXIN) 500 MG tablet Take 1 tablet (500 mg) by mouth 3 times daily (Patient not taking: Reported on 4/14/2023) 40 tablet 4    naloxone (NARCAN) 4 MG/0.1ML nasal spray Spray 1 spray (4 mg) into one nostril alternating nostrils once as needed for opioid reversal every 2-3 minutes until assistance arrives (Patient not taking: Reported on 4/14/2023) 0.2 mL 0    senna-docusate (SENOKOT-S/PERICOLACE) 8.6-50 MG tablet Take 2 tablets by mouth 2 times daily (Patient not taking: Reported on 4/14/2023) 30 tablet 0              Physical Exam:   /79   Pulse 80   Temp 98.8  F (37.1  C) (Oral)   Resp 16   Wt 85.3 kg (188 lb)   SpO2 97%   BMI 29.44 kg/m      Gen: NAD, pleasant and cooperative, sitting in wheelchair  HEENT: Normocephalic, atraumatic, extra-ocular movements appear intact  Pulm: non-labored breathing in room air  Ext: mild edema in left leg surrounding fracture site, no tenderness in calves  Neuro/MSK:   Orientation: Oriented to person, place, time, situation. Exhibits good insight into her condition and ongoing management/symptoms.  Motor:  4+/5 with bilateral hip flexion, 5/5 with bilateral knee extension, 5/5 with left ankle dorsiflexion, EHL, plantar flexion.   Right ankle not tested with AFO today.    Labs/Imaging:  Lab Results   Component Value Date    WBC 7.8 10/27/2022    HGB 11.9 10/27/2022    HCT 37.3 10/27/2022    MCV 90 10/27/2022     10/27/2022     Lab Results   Component Value Date     01/11/2023    POTASSIUM 4.3 01/11/2023    CHLORIDE 105 01/11/2023    CO2 23 01/11/2023     (H) 01/11/2023     Lab Results   Component Value Date    GFRESTIMATED 77 04/12/2023    GFRESTBLACK >90 05/07/2021    GFRESTBLACK >90 05/07/2021     Lab Results   Component Value Date    AST 22 10/27/2022    ALT 16 10/27/2022    ALKPHOS 96 10/27/2022    BILITOTAL 0.2 10/27/2022    BILICONJ 0.0 11/08/2012     Lab Results   Component Value Date    INR 0.85 (L) 08/20/2010     Lab Results   Component  Value Date    BUN 20.7 01/11/2023    CR 0.82 04/12/2023              Assessment/Plan   Nadira Perez presents to clinic today for follow up reg her rehab needs.   She has h/o left stage IIIC inflammatory breast cancer (ER/MI+ve, HER2-ve). Was last seen in clinic on 1/13/23.  Multiple rehabilitation considerations were discussed with Ismael at today's visit.  Overall, she is doing much better with her ambulation with her right AFO on, and feels like it is now fitting well and she has gotten used to wearing it when up and ambulating.  She should continue to wear this at all times for transfers and ambulation.  On exam today, her left ankle exhibits good strength but we will continue to monitor this as she has some perceived weakness on that side  In the setting of her neuropathy.  Her left upper extremity lymphedema has been under good control, and she continues to work with lymphedema therapy and has a plan which she has already implemented for her home lymphedema management regimen which she will continue even after discharge from therapy.  She has been interested for some time in exploring RFA as an option for her lumbar symptoms, and would like a referral to pain management to have a discussion about this.  We discussed that her mild lumbar radiculopathy symptoms coexist but are not related to her weakness as a result of polyneuropathy.  A referral was placed to pain management today.  We will plan a 4-month return visit.  Ismael is in agreement with the above plan.        Work-up: Reviewed results of EMG and lab work-up from last visit.  Therapy/equipment/braces:  Continue  wearing right AFO for right foot drop to help with safety when transferring and ambulating.  Continue home exercise program including home lymphedema management regimen.  Continue working with lymphedema therapy.  Continue using cane and walker for assist with transfers and ambulation.  Medications:  Continue gabapentin at current  dosing.  Interventions:  Referral placed to pain management team as patient would like to consider RFA for her lumbar symptoms.    Follow up: 4 months.      Carmen Centeno MD  Physical Medicine & Rehabilitation      50 minutes spent on the date of the encounter doing chart review, history and exam, documentation and further activities as noted above.

## 2023-04-17 ENCOUNTER — TELEPHONE (OUTPATIENT)
Dept: ANESTHESIOLOGY | Facility: CLINIC | Age: 73
End: 2023-04-17
Payer: COMMERCIAL

## 2023-04-17 NOTE — TELEPHONE ENCOUNTER
RN spoke with patient to schedule appointment with Dr. Umaña. Patient agreed to appointment on 4/20. Writer scheduled patient and patient appreciated the call.    Lyric Johns RNCC

## 2023-04-17 NOTE — TELEPHONE ENCOUNTER
RN left voicemail for patient to return call to schedule a visit with Dr. Umaña per referral. Writer left call back number [phone]. Patient may be scheduled in person or video on 4/25/23 at 2:45pm per provider request.    Lyric Johns RNCC

## 2023-04-17 NOTE — TELEPHONE ENCOUNTER
Pt returned call.  Offered Pt 4/25 @ 2:45.  Pt said that this will not work.  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  Pt is requesting a call back with an alternative date & time.  Thanks.

## 2023-04-18 ENCOUNTER — OFFICE VISIT (OUTPATIENT)
Dept: PALLIATIVE MEDICINE | Facility: OTHER | Age: 73
End: 2023-04-18
Payer: COMMERCIAL

## 2023-04-18 DIAGNOSIS — Z98.1 S/P SPINAL FUSION: Primary | ICD-10-CM

## 2023-04-18 DIAGNOSIS — G89.29 CHRONIC BILATERAL LOW BACK PAIN, UNSPECIFIED WHETHER SCIATICA PRESENT: ICD-10-CM

## 2023-04-18 DIAGNOSIS — M54.50 CHRONIC BILATERAL LOW BACK PAIN, UNSPECIFIED WHETHER SCIATICA PRESENT: ICD-10-CM

## 2023-04-18 DIAGNOSIS — M54.59 OTHER LOW BACK PAIN: ICD-10-CM

## 2023-04-18 PROCEDURE — 97810 ACUP 1/> WO ESTIM 1ST 15 MIN: CPT | Performed by: ACUPUNCTURIST

## 2023-04-18 PROCEDURE — 97811 ACUP 1/> W/O ESTIM EA ADD 15: CPT | Performed by: ACUPUNCTURIST

## 2023-04-18 ASSESSMENT — PAIN SCALES - PAIN ENJOYMENT GENERAL ACTIVITY SCALE (PEG)
PEG_TOTALSCORE: 5
AVG_PAIN_PASTWEEK: 5
INTERFERED_ENJOYMENT_LIFE: 4
INTERFERED_GENERAL_ACTIVITY: 6

## 2023-04-19 ASSESSMENT — ENCOUNTER SYMPTOMS
PALPITATIONS: 1
EYE PAIN: 0
DIFFICULTY URINATING: 1
SYNCOPE: 0
BRUISES/BLEEDS EASILY: 0
LEG PAIN: 1
DISTURBANCES IN COORDINATION: 1
DECREASED LIBIDO: 1
MEMORY LOSS: 0
HEADACHES: 0
HOARSE VOICE: 0
MYALGIAS: 1
MUSCLE WEAKNESS: 1
NERVOUS/ANXIOUS: 0
DOUBLE VISION: 0
DEPRESSION: 0
PANIC: 0
TREMORS: 0
DECREASED CONCENTRATION: 0
NECK MASS: 0
JOINT SWELLING: 1
PARALYSIS: 0
ARTHRALGIAS: 1
SINUS PAIN: 0
INSOMNIA: 1
SORE THROAT: 0
TROUBLE SWALLOWING: 1
HYPERTENSION: 1
MUSCLE CRAMPS: 1
ORTHOPNEA: 0
TINGLING: 1
TASTE DISTURBANCE: 0
NECK PAIN: 1
SWOLLEN GLANDS: 0
SMELL DISTURBANCE: 0
EYE WATERING: 0
LOSS OF CONSCIOUSNESS: 0
DIZZINESS: 1
EXERCISE INTOLERANCE: 0
FLANK PAIN: 0
HOT FLASHES: 0
HYPOTENSION: 0
SEIZURES: 0
DYSURIA: 0
LIGHT-HEADEDNESS: 1
SPEECH CHANGE: 0
STIFFNESS: 1
WEAKNESS: 1
EYE IRRITATION: 1
BACK PAIN: 1
NUMBNESS: 1
EYE REDNESS: 0
SLEEP DISTURBANCES DUE TO BREATHING: 0
HEMATURIA: 0
SINUS CONGESTION: 0

## 2023-04-19 ASSESSMENT — ANXIETY QUESTIONNAIRES
8. IF YOU CHECKED OFF ANY PROBLEMS, HOW DIFFICULT HAVE THESE MADE IT FOR YOU TO DO YOUR WORK, TAKE CARE OF THINGS AT HOME, OR GET ALONG WITH OTHER PEOPLE?: NOT DIFFICULT AT ALL
IF YOU CHECKED OFF ANY PROBLEMS ON THIS QUESTIONNAIRE, HOW DIFFICULT HAVE THESE PROBLEMS MADE IT FOR YOU TO DO YOUR WORK, TAKE CARE OF THINGS AT HOME, OR GET ALONG WITH OTHER PEOPLE: NOT DIFFICULT AT ALL
5. BEING SO RESTLESS THAT IT IS HARD TO SIT STILL: NOT AT ALL
3. WORRYING TOO MUCH ABOUT DIFFERENT THINGS: NOT AT ALL
GAD7 TOTAL SCORE: 0
6. BECOMING EASILY ANNOYED OR IRRITABLE: NOT AT ALL
7. FEELING AFRAID AS IF SOMETHING AWFUL MIGHT HAPPEN: NOT AT ALL
2. NOT BEING ABLE TO STOP OR CONTROL WORRYING: NOT AT ALL
1. FEELING NERVOUS, ANXIOUS, OR ON EDGE: NOT AT ALL
4. TROUBLE RELAXING: NOT AT ALL
7. FEELING AFRAID AS IF SOMETHING AWFUL MIGHT HAPPEN: NOT AT ALL
GAD7 TOTAL SCORE: 0

## 2023-04-20 ENCOUNTER — OFFICE VISIT (OUTPATIENT)
Dept: ANESTHESIOLOGY | Facility: CLINIC | Age: 73
End: 2023-04-20
Attending: ANESTHESIOLOGY
Payer: COMMERCIAL

## 2023-04-20 ENCOUNTER — TELEPHONE (OUTPATIENT)
Dept: ANESTHESIOLOGY | Facility: CLINIC | Age: 73
End: 2023-04-20
Payer: COMMERCIAL

## 2023-04-20 VITALS
TEMPERATURE: 98.7 F | WEIGHT: 191 LBS | SYSTOLIC BLOOD PRESSURE: 131 MMHG | OXYGEN SATURATION: 98 % | HEART RATE: 90 BPM | BODY MASS INDEX: 29.91 KG/M2 | DIASTOLIC BLOOD PRESSURE: 80 MMHG | RESPIRATION RATE: 16 BRPM

## 2023-04-20 DIAGNOSIS — I89.0 LYMPHEDEMA: ICD-10-CM

## 2023-04-20 DIAGNOSIS — M21.371 RIGHT FOOT DROP: ICD-10-CM

## 2023-04-20 DIAGNOSIS — C50.919 MALIGNANT NEOPLASM OF BREAST IN FEMALE, ESTROGEN RECEPTOR POSITIVE, UNSPECIFIED LATERALITY, UNSPECIFIED SITE OF BREAST (H): ICD-10-CM

## 2023-04-20 DIAGNOSIS — Z17.0 MALIGNANT NEOPLASM OF BREAST IN FEMALE, ESTROGEN RECEPTOR POSITIVE, UNSPECIFIED LATERALITY, UNSPECIFIED SITE OF BREAST (H): ICD-10-CM

## 2023-04-20 DIAGNOSIS — G62.9 PERIPHERAL POLYNEUROPATHY: ICD-10-CM

## 2023-04-20 DIAGNOSIS — M46.1 SACROILIITIS (H): Primary | ICD-10-CM

## 2023-04-20 PROCEDURE — 99204 OFFICE O/P NEW MOD 45 MIN: CPT | Mod: GC | Performed by: ANESTHESIOLOGY

## 2023-04-20 PROCEDURE — G0463 HOSPITAL OUTPT CLINIC VISIT: HCPCS | Performed by: ANESTHESIOLOGY

## 2023-04-20 ASSESSMENT — ENCOUNTER SYMPTOMS
EYE PAIN: 0
MEMORY LOSS: 0
FLANK PAIN: 0
PALPITATIONS: 1
MYALGIAS: 1
HEADACHES: 0
SINUS PAIN: 0
DIZZINESS: 1
ORTHOPNEA: 0
EYE REDNESS: 0
SEIZURES: 0
NECK PAIN: 1
TREMORS: 0
SORE THROAT: 0
INSOMNIA: 1
NERVOUS/ANXIOUS: 0
LOSS OF CONSCIOUSNESS: 0
DEPRESSION: 0
DYSURIA: 0
TINGLING: 1
BRUISES/BLEEDS EASILY: 0
HEMATURIA: 0
BACK PAIN: 1
SPEECH CHANGE: 0
WEAKNESS: 1
DOUBLE VISION: 0

## 2023-04-20 ASSESSMENT — PAIN SCALES - GENERAL: PAINLEVEL: SEVERE PAIN (6)

## 2023-04-20 NOTE — PATIENT INSTRUCTIONS
Procedures:    Call to schedule your procedure: [phone] option #2  Left Sacroiliac Joint Injection    Your pre-procedure instructions are below, please call our clinic if you have any questions.      Recommended Follow up:      Follow up on 5/18/23.    To speak with a nurse, schedule/reschedule/cancel a clinic appointment, or request a medication refill call: [phone].    You can also reach us by [employer]: [email or web address]      Procedure Information related to COVID-19     Please call [phone] option #2 to schedule, reschedule, or cancel your procedure appointment.   Phones are answered Monday - Friday from 08:00 - 4:30pm.  Leave a voicemail with your name, birth date, and phone number if no one is available to take your call.        You no longer need to test for COVID- 19 prior to your procedure/surgery, unless your physician specifically requests that you test. If you experience COVID symptoms or have tested positive for COVID-19 within 14 days of your scheduled surgery or procedure, please update our office right away and your procedure may have to be postponed.       The procedure center staff will call you several days before the procedure to review important information that you will need to know for the day of the procedure.     Please contact the clinic if you have further questions about this information [phone].        Information related to Scheduling and Pre-Procedure Instructions:    If you must reschedule your procedure more than two times, you must follow up in clinic before rescheduling again.      Preparing for your procedure    CAUTION - FAILURE TO FOLLOW THESE PRE-PROCEDURE INSTRUCTIONS WILL RESULT IN YOUR PROCEDURE BEING RESCHEDULED.    Your Procedure: Left Sacroiliac Joint Injection            You must have a  take you home after your procedure. Transportation by taxi or para-transit is okay as long as you have a responsible adult accompany  you. You must provide your 's full name and contact number at time of check in.     Fasting Protocol Please have nothing to eat or drink 1 hour prior to arrival.     Medications If you take any medications, DO NOT STOP. Take your medications as usual the day of your procedure with a sip of water AT LEAST 2 HOURS PRIOR TO ARRIVAL.    Antibiotics If you are currently taking antibiotics, you must complete the entire dose 7 days prior to your scheduled procedure. You must be clear of any signs or symptoms of infection. If you begin antibiotics, please contact our clinic for instructions.     Fever, Chills, or Rash If you experience a fever of higher than 100 degrees, chills, rash, or open wounds during the one week before your procedure, please call the clinic to see if you may proceed with your procedure.      Medication Hold List  **Patients under Cardiology/Neurology care should consult their provider prior to the pain procedure to verify pre-procedure medication instructions. The information below contains general guidelines.**        Blood Thinners If you are taking daily ASPIRIN, PLAVIX, OR OTHER BLOOD THINNERS SUCH AS COUMADIN/WARFARIN, we will need your prescribing doctor to sign a release permitting you to stop these medications. Once approved by your prescribing doctor - STOP ALL BLOOD THINNERS BASED ON THE TIME TABLE BELOW PRIOR TO YOUR PROCEDURE. If you have been instructed to stop WARFARIN(COUMADIN), you must have an INR lab drawn the day before your procedure. . Your INR must be within normal limits before we can perform your injection. MEDICATIONS CAN BE RESTARTED AFTER YOUR PROCEDURE.    14 DAY HOLD  Ticlid (ticlopidine)    10 DAY HOLD  Effient (Prasugel)    3 DAY HOLD  Xarelto (rivaroxaban) 7 DAY HOLD  Anacin, Bufferin, Ecotrin, Excedrin, Aggrenox (Aspirin)  Brilinta (ticagrelor)  Coumadin (Warfarin)  Pradexa (Dabigatran)  Elmiron (Pentosan)  Plavix (Clopidogrel Bisulfate)  Pletal  (Cilostazol)    24 HOUR HOLD  Lovenox (enoxaparin)  Agrylin (Anagrelide)        Non-steroidal Anti-inflammatories (NSAIDs) DO NOT TAKE any non-steroidal anti-inflammatory medications (NSAIDs) listed on the table below. MEDICATIONS CAN BE RESTARTED AFTER YOUR PROCEDURE. Celebrex is OK to take and does not need to be discontinued.     Medications to stop:  3 DAY HOLD  Advil, Motrin (Ibuprofen)  Arthrotec (diciofenac sodium/misoprostol)  Clinoril (Sulindac)  Indocin (Indomethacin)  Lodine (Etodolac)  Toradol (Ketorolac)  Vicoprofen (Hydrocodone and Ibuprofen)  Voltaren (Diclofenac)  Apixaban (Eliquis)    14 DAY HOLD  Daypro (Oxaprozin)  Feldene (Piroxicam)   7 DAY HOLD  Aleve (Naproxen sodium)  Darvon compound (contains aspirin)  Naprosyn (Naproxen)  Norgesic Forte (contains aspirin)  Mobic (Meloxicam)  Oruvall (Ketoprofen)  Percodan (contains aspirin)  Relafen (Nabumetone)  Salsalate  Trilisate  Vitamin E (more than 400 mg per day)  Any medication containing aspirin                To speak with a nurse, schedule/reschedule/cancel a clinic appointment, or request a medication refill call: [phone]    You can also reach us by [employer]: [email or web address]

## 2023-04-20 NOTE — TELEPHONE ENCOUNTER
Patient is scheduled with Dr. Umaña   Spoke with: the patient   Date of Procedure: 04/27   Location: [facility]   Informed patient they will need an adult : yes   Additional comments: Okay'd to schedule by procedure desk due to the capped number of cases.    Patient is aware pre-op RN will call 2-3 days prior to procedure with arrival time and instructions       Sarthak Milton on 4/20/2023 at 10:35 AM

## 2023-04-20 NOTE — NURSING NOTE
"Oncology Rooming Note    April 20, 2023 9:30 AM   Nadira Perez is a 72 year old female who presents for:    Chief Complaint   Patient presents with     Oncology Clinic Visit     [facility]     Initial Vitals: Blood Pressure 131/80   Pulse 90   Temperature 98.7  F (37.1  C) (Oral)   Respiration 16   Weight 86.6 kg (191 lb)   Oxygen Saturation 98%   Body Mass Index 29.91 kg/m   Estimated body mass index is 29.91 kg/m  as calculated from the following:    Height as of 1/13/23: 1.702 m (5' 7\").    Weight as of this encounter: 86.6 kg (191 lb). Body surface area is 2.02 meters squared.  Severe Pain (6) Comment: Data Unavailable   No LMP recorded. Patient has had a hysterectomy.  Allergies reviewed: Yes  Medications reviewed: Yes    Medications: Medication refills not needed today.  Pharmacy name entered into [employer] SOFTWARE:    [facility]  [facility] [id number] [address] [facility] [address] [address]    Clinical concerns: none       Kitty Marti MA            "

## 2023-04-20 NOTE — PROGRESS NOTES
"  Pain Clinic New Patient Consult Note:    Referring Provider: Jacobo   Primary care provider: Matilde Quiñonez    Nadira Perez is a 72 year old y.o. old female who presents to the pain clinic with pain in her low back \"around the SIJ area\".  Her other pains have been relatively stable and unchanged. Right now she is doing pilate's regularly and taking warm showers/baths that do help her low back. The pain is constantly present but with intermittent flares. The pain is mostly left sided below the lumbar spine. She has had this kind of pain in the past and discussed it with her provider who recommended she see us for formal evaluation. She has had prior lumbar surgery which is just above where her pain is. She describes her pain characteristics below. She denies any other acute changes in b/b habits or strength.      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 She has chronic low back pain and had L5/S1 fusion 2022. She had a CT of her lumbar spine 5/2022 negative for metastatic disease. Peripheral neuropathy precedes cancer diagnosis    HPI:  Patient Supplied Answers To the  Pain Questionnaire      4/19/2023     7:20 PM    Pain -  Patient Entered Questionnaire/Answers   How would you describe the pain cramping    sharp    pressure   Which of the following worsen your pain lying down    standing    walking    exercise    coughing / sneezing   Which of the following improve or reduce your pain sitting    medication    relaxation    thinking about something else   What number best describes your average pain for the past week:  0 = No pain  to  10 = Worst pain imaginable 4   What number best describes your LOWEST pain in past 24 hours:  0 = No pain  to  10 = Worst pain imaginable 3   What number best describes your WORST pain in past 24 hours:  0 = No pain  to  10 = Worst pain imaginable " 6   When is your pain worst Constant   What non-medicine treatments have you already had for your pain physical therapy    acupuncture    spine injections (shots)    surgery    exercise        Pain treatments:  Herbal medicines: No  Physical therapy: Yes  Chiropractor: No (not recommended)  Pain physician: No  Surgery: Yes  Biofeedback: Yes  Acupuncture: Yes    Tests/Imaging reviewed with the patient:  None    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 OR     CATARACT IOL, RT/LT Right 03/13/2018     CATARACT IOL, RT/LT Left 02/20/2018     COLONOSCOPY  12/24/2013    Procedure: COMBINED COLONOSCOPY, SINGLE BIOPSY/POLYPECTOMY BY BIOPSY;  COLONOSCOPY;  Surgeon: Dom Alvarez MD;  Location:  GI     COSMETIC BLEPHAROPLASTY LOWER LIDS BILATERAL Bilateral 5/6/2022    Procedure: BLEPHAROPLASTY, LOWER EYELID, BILATERAL, COSMETIC;  Surgeon: Jemal Sanchez MD;  Location: [facility] OR     ESOPHAGOSCOPY, GASTROSCOPY, DUODENOSCOPY  (EGD), COMBINED N/A 11/23/2016    Procedure: COMBINED ESOPHAGOSCOPY, GASTROSCOPY, DUODENOSCOPY (EGD);  Surgeon: Quinten Feliciano MD;  Location:  GI     EXTERNAL EAR SURGERY      right     EYE SURGERY      radial keratomy     FUSION, SPINE, INTERBODY, OBLIQUE ANTERIOR AND LUMBAR, 2 LEVELS, POST APPROACH, USING OTS N/A 11/18/2021    Procedure: Part 1: Oblique Anterior Interbody Fusion at Lumbar 5 to sacral 1 with use of Bone Morphogenic Protein,;  Surgeon: Serge Ramirez MD;  [facility]     GRAFT BONE FROM ILIAC CREST  02/14/2013    Procedure: GRAFT BONE FROM ILIAC CREST;  mac with block and local infilitration;  Surgeon: Av Mendez MD;  [facility]     HC BREATH HYDROGEN TEST N/A 10/14/2016    Procedure: HYDROGEN BREATH TEST;  Surgeon: Cheri Barron MD;  Location:  GI     HERNIA REPAIR      umbilical age 18 mos.     HYSTERECTOMY TOTAL ABDOMINAL  05/03/2000     MASTECTOMY MODIFIED RADICAL Bilateral     bilateral; right breast prophylactic     OPTICAL TRACKING SYSTEM FUSION POSTERIOR SPINE LUMBAR N/A 11/18/2021    Procedure: Open Posterior Instrumented Spinal Fusion at Lumbar 5 to Sacral 1, with grimm aguayo osteotomy, use of O-Arm/Stealth;  Surgeon: Serge Ramirez MD;  [facility]: [facility]     PARATHYROIDECTOMY  09/23/2004    R SUPERIOR     PARATHYROIDECTOMY  09/23/2004    parathyroid resection, subtotal     RELEASE CARPAL TUNNEL Right 12/2/2021    Procedure: RIGHT CARPAL TUNNEL RELEASE, RIGHT WRIST HARDWARE REMOVAL, RIGHT CARPAL BOSS EXCISION;  Surgeon: Rolan Conley MD;  Location: [facility] OR     REMOVE HARDWARE HAND  09/24/2013    Procedure: REMOVE HARDWARE HAND;  Left Hand Screw Removal        RHINOPLASTY  1968     thyr proc skin closed cosmetic manner by subcuticular stitch  01/23/2009     THYROPLASTY  10/09/2009     TONSILLECTOMY  1977     VITRECTOMY PARSPLANA WITH 25 GAUGE SYSTEM Left 6/20/2022    Procedure: LEFT EYE VITRECTOMY, PARS PLANA APPROACH,  USING 25-GAUGE INSTRUMENTS, laser;  Surgeon: Felix Carlos MD;  Location: [facility] OR     WRIST SURGERY      wrist arthrodesis          Family History:  Family History   Problem Relation Age of Onset     Neurologic Disorder Mother         Anuerysm of Cerebral Artery, Dementia     Diabetes Mother      Thyroid Disease Mother         ,     Cerebrovascular Disease Mother      Dementia Mother      Osteoporosis Mother      Heart Disease Father         AAA     Hypertension Father      Circulatory Brother         Perihperal Neurophathy     Diabetes Maternal Grandmother      Asthma Maternal Grandmother      Chronic Obstructive Pulmonary Disease Maternal Grandfather         father     Asthma Maternal Grandfather      Diabetes Maternal Aunt         x2     Melanoma Maternal Aunt      Glaucoma Maternal Aunt      Breast Cancer Cousin      Dementia Other      Cancer Other         malignant melanoma     Hypertension Other      Hypertension Other      Cerebrovascular Disease Other      Cerebrovascular Disease Other      Obesity Other      Respiratory Other      Cancer Other      Diabetes Other      Asthma Other      Macular Degeneration No family hx of      Coronary Artery Disease No family hx of      Hyperlipidemia No family hx of      Kidney Disease No family hx of      Thrombosis No family hx of      Arthritis No family hx of      Depression No family hx of      Mental Illness No family hx of      Substance Abuse No family hx of      Cystic Fibrosis No family hx of      Early Death No family hx of      Coronary Artery Disease Early Onset No family hx of      Heart Failure No family hx of      Bleeding Diathesis No family hx of      Ovarian Cancer No family hx of      Uterine Cancer No family hx of      Prostate Cancer No family hx of      Colorectal Cancer No family hx of      Pancreatic Cancer No family hx of      Lung Cancer No family hx of      Other Cancer No family hx of      Autoimmune Disease No family hx of       Unknown/Adopted No family hx of      Genetic Disorder No family hx of      Bleeding Disorder No family hx of      Clotting Disorder No family hx of      Anesthesia Reaction No family hx of           Social History:  Social History     Socioeconomic History     Marital status:      Spouse name: Not on file     Number of children: Not on file     Years of education: Not on file     Highest education level: Not on file   Occupational History     Not on file   Tobacco Use     Smoking status: Never     Smokeless tobacco: Never   Vaping Use     Vaping status: Not on file   Substance and Sexual Activity     Alcohol use: Not Currently     Alcohol/week: 7.0 standard drinks of alcohol     Types: 7 Glasses of wine per week     Comment: Rare to occasional     Drug use: Yes     Types: Marijuana     Sexual activity: Not Currently     Partners: Male     Birth control/protection: Abstinence   Other Topics Concern      Service No     Blood Transfusions Not Asked     Caffeine Concern No     Occupational Exposure No     Hobby Hazards No     Sleep Concern No     Stress Concern No     Weight Concern No     Special Diet No     Back Care No     Exercise Yes     Comment: walks 4-6x  week for 20-30 min. each     Bike Helmet Not Asked     Seat Belt Yes     Self-Exams Yes     Parent/sibling w/ CABG, MI or angioplasty before 65F 55M? No   Social History Narrative    .  Recently retired. She has retired from [employer] and hopes to focus on a home care program, [employer],  for the elderly in the future.        She lives with a renter.         She exercises 50 minutes three times a week.     Social Determinants of Health     Financial Resource Strain: Not on file   Food Insecurity: Not on file   Transportation Needs: Not on file   Physical Activity: Not on file   Stress: Not on file   Social Connections: Not on file   Intimate Partner Violence: Not At Risk (4/14/2023)    Humiliation, Afraid, Rape, and Kick questionnaire       Fear of Current or Ex-Partner: No      Emotionally Abused: No      Physically Abused: No      Sexually Abused: No   Housing Stability: Not on file     Social History     Social History Narrative    .  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        She lives with a renter.         She exercises 50 minutes three times a week.          Allergies:  Allergies   Allergen Reactions     Erythromycin Nausea     Penicillins Hives     Around age 6 - doesn't recall full reaction, mother told her it was hives.     Has tolerated cephalsporins.        Current Medications:   Current Outpatient Medications   Medication Sig Dispense Refill     acetaminophen (TYLENOL) 500 MG tablet Take 500-1,000 mg by mouth every 6 hours as needed for mild pain       amLODIPine (NORVASC) 10 MG tablet Take 1 tablet (10 mg) by mouth daily 90 tablet 3     Ascorbic Acid (VITAMIN C) 500 MG CHEW Take 1 tablet by mouth daily       atorvastatin (LIPITOR) 80 MG tablet Take 1 tablet (80 mg) by mouth daily 90 tablet 3     Cholecalciferol (VITAMIN D3) 50 MCG (2000 UT) CAPS TAKE 3 CAPSULES (6,000 UNITS) BY MOUTH DAILY. 270 capsule 3     CRANBERRY EXTRACT PO Take 650 mg by mouth daily ~10 am  Takes 1       diclofenac (VOLTAREN) 1 % topical gel APPLY 4 GRAMS TO KNEES OR 2 GRAMS TO HANDS FOUR TIMES DAILY USING ENCLOSED DOSING CARD. 100 g 3     gabapentin (NEURONTIN) 300 MG capsule Take 4 capsules (1,200 mg) by mouth 3 times daily 1080 capsule 3     HYDROcodone-acetaminophen (NORCO) 5-325 MG tablet Take 1 tab for breakthrough pain 1-2 times a week. 30 tablet 0     hydrOXYzine (VISTARIL) 25 MG capsule Take 1 capsule (25 mg) by mouth At Bedtime (Patient not taking: Reported on 4/14/2023) 90 capsule 3     ibuprofen (ADVIL/MOTRIN) 200 MG tablet Take 400 mg by mouth 3 times daily as needed for pain (Patient not taking: Reported on 4/14/2023)       levothyroxine  (SYNTHROID/LEVOTHROID) 112 MCG tablet TAKE 1/2 TABLET BY MOUTH DAILY 45 tablet 3     lisinopril (ZESTRIL) 40 MG tablet Take 1 tablet (40 mg) by mouth daily 90 tablet 3     magnesium 250 MG tablet Take 1 tablet by mouth daily       melatonin 5 MG tablet Take 10 mg by mouth At Bedtime        methocarbamol (ROBAXIN) 500 MG tablet Take 1 tablet (500 mg) by mouth 3 times daily (Patient not taking: Reported on 4/14/2023) 40 tablet 4     metoprolol succinate ER (TOPROL XL) 50 MG 24 hr tablet Take 1 tablet (50 mg) by mouth daily 90 tablet 3     Multiple Vitamin (MULTI-VITAMIN) per tablet Take 1 tablet by mouth daily       naloxone (NARCAN) 4 MG/0.1ML nasal spray Spray 1 spray (4 mg) into one nostril alternating nostrils once as needed for opioid reversal every 2-3 minutes until assistance arrives (Patient not taking: Reported on 4/14/2023) 0.2 mL 0     ondansetron (ZOFRAN-ODT) 4 MG ODT tab Take 1 tablet (4 mg) by mouth every 12 hours as needed for nausea 180 tablet 3     senna-docusate (SENOKOT-S/PERICOLACE) 8.6-50 MG tablet Take 2 tablets by mouth 2 times daily (Patient not taking: Reported on 4/14/2023) 30 tablet 0     spironolactone (ALDACTONE) 25 MG tablet Take 2 tablets (50 mg) by mouth daily 180 tablet 3     Vaginal Lubricant (REPHRESH) GEL Place 2 g vaginally every 3 days 2 g 3          Current Pain Medications:  Medications related to Pain Management (From now, onward)    None           Past Pain Medications:  Vicodin 30 tabs for 3 months  Methocarbamol PRN  Gabapentin  Tylenol    Blood thinner:  None    Work History:    Current work status: Retired    Review of Systems:  Review of Systems   HENT: Positive for hearing loss. Negative for ear discharge, ear pain, nosebleeds, sinus pain, sore throat and tinnitus.    Eyes: Negative for double vision, pain and redness.   Cardiovascular: Positive for palpitations. Negative for chest pain and orthopnea.   Genitourinary: Positive for urgency. Negative for dysuria, flank  pain and hematuria.   Musculoskeletal: Positive for back pain, myalgias and neck pain.   Neurological: Positive for dizziness, tingling and weakness. Negative for tremors, speech change, seizures, loss of consciousness and headaches.   Endo/Heme/Allergies: Does not bruise/bleed easily.   Psychiatric/Behavioral: Negative for depression and memory loss. The patient has insomnia. The patient is not nervous/anxious.          Physical Exam:     Vitals:    04/20/23 0918   BP: 131/80   Pulse: 90   Resp: 16   Temp: 98.7  F (37.1  C)   TempSrc: Oral   SpO2: 98%   Weight: 86.6 kg (191 lb)       General Appearance: No distress, seated comfortably  Mood: Euthymic  HE ENT: Non constricted pupils  Respiratory: Non labored breathing  CVS: Regular rate and rhythm  GI: Soft, non distended, no TTP  Skin: No rashes over exposed skin  NeuroMSK: BL LE WNL, SILT  Gait: antalgic, ambulates with rollator for assistance    Pain specific exam:  Hang finger positive on right   CINDY positive especially with limited flexion and distraction of the SI joint was painful.   SI Shear +  SI Compression +     Laboratory results:  Recent Labs   Lab Test 04/12/23  1310 01/11/23  1056 10/27/22  1428   NA  --  141 140   POTASSIUM  --  4.3 4.4   CHLORIDE  --  105 105   CO2  --  23 22   ANIONGAP  --  13 13   GLC  --  117* 104*   BUN  --  20.7 19.0   CR 0.82 0.75 0.76   RAMBO 9.9 10.0  10.0 9.4  9.4       CBC RESULTS:   Recent Labs   Lab Test 10/27/22  1428   WBC 7.8   RBC 4.15   HGB 11.9   HCT 37.3   MCV 90   MCH 28.7   MCHC 31.9   RDW 13.8            Imaging:       ASSESSMENT AND PLAN:     Encounter Diagnosis:  Sacroiliac joint pain  Lumbosacral spondylosis  L5/S1 fusion  Spinal stenosis  Peripheral neuropathy    Nadira Perez is a 72 year old y.o. old female who presents to the pain clinic with a complex history and complaints of left SIJ pain. Both her history and exam are consistent with SIJ pain as she suspects. It is also likely that  her SIJ pain worsened with the lumbar fusion. We discussed options as far as medications but she is on a stable regimen so we will not make any adjustments to it at this time. We will proceed with a left SIJ injection and reassess efficacy at follow up. In the meantime she will continue with PT as scheduled for gait and balance training.     I have summarized the patient s past medical history, discussed their clinical findings and the potential differential diagnosis with the patient. Significant past medical history pertinent to the patient s current condition includes SIJ dysfunction. The differential diagnosis discussed with the patient are listed above. I have discussed anatomy and possible sources of the pain using models and/or pictures (diagrams). I have discussed multi- disciplinary pain management options withthe patient as pertaining to their case as detailed above. The pain management options we discussed included, but were not limited to the recommendations below.  I also discussed with patient the risks, benefits and alternatives to each pain management option.  All of the patient s questions and concerns were answered to the best of my ability.    RECOMMENDATIONS:     1. Medications: No changes made this visit.    2. Procedure: We are scheduling the patient for left SIJ injection in the procedure suite. Risks/benefits/alternatives were discussed.     I also discussed with the patient that the possible risks involved with interventional treatment included, but are not limited to, no pain control, worsened pain, stroke,seizure, spinal headache, allergic reactions, introduction of infection or bleeding which may lead to emergent spine surgery, nerve damage, paralysis oreven death.    3. Physical therapy: Continue as scheduled for balance and gait training      Follow up: 2-3 weeks after the injection      Live Shipman M.D.  [facility] Pain Fellow        Answers for HPI/ROS submitted by the patient on  4/19/2023  DIANNE 7 TOTAL SCORE: 0  General Symptoms: No  Skin Symptoms: No  HENT Symptoms: Yes  EYE SYMPTOMS: Yes  HEART SYMPTOMS: Yes  LUNG SYMPTOMS: No  INTESTINAL SYMPTOMS: No  URINARY SYMPTOMS: Yes  GYNECOLOGIC SYMPTOMS: Yes  BREAST SYMPTOMS: No  SKELETAL SYMPTOMS: Yes  BLOOD SYMPTOMS: Yes  NERVOUS SYSTEM SYMPTOMS: Yes  MENTAL HEALTH SYMPTOMS: Yes  Congestion: No  Trouble swallowing: Yes   Voice hoarseness: No  Mouth sores: Yes  Tooth pain: No  Gum tenderness: Yes  Bleeding gums: Yes  Change in taste: No  Change in sense of smell: No  Dry mouth: Yes  Hearing aid used: Yes  Neck lump: No  Vision loss: No  Dry eyes: Yes  Watery eyes: No  Eye bulging: No  Flashing of lights: No  Floaters: Yes  Crossed eyes: No  Tunnel Vision: No  Yellowing of eyes: No  Eye irritation: Yes  Pain in legs with walking: Yes  Fingers or toes appear blue: No  High blood pressure: Yes  Low blood pressure: No  Fainting: No  Murmurs: Yes  Pacemaker: No  Varicose veins: Yes  Wake up at night with shortness of breath: No  Light-headedness: Yes  Exercise intolerance: No  Trouble holding urine or incontinence: Yes  Increased frequency of urination: Yes  Decreased frequency of urination: No  Frequent nighttime urination: Yes  Difficulty emptying bladder: Yes  Bleeding or spotting between periods: No  Heavy or painful periods: No  Irregular periods: No  Vaginal discharge: No  Hot flashes: No  Vaginal dryness: Yes  Genital ulcers: No  Reduced libido: Yes  Painful intercourse: Yes  Difficulty with sexual arousal: Yes  Post-menopausal bleeding: No  Swollen joints: Yes  Joint pain: Yes  Bone pain: Yes  Muscle cramps: Yes  Muscle weakness: Yes  Joint stiffness: Yes  Bone fracture: No  Edema or swelling: Yes  Anemia: No  Swollen glands: No  Trouble with coordination: Yes  Difficulty walking: Yes  Paralysis: No  Numbness: Yes  Trouble thinking or concentrating: No  Mood changes: No  Panic attacks: No    ATTENDING ATTESTATION  I saw the patient along with  the pain medicine fellow Dr. Live Tovar. Please see his note above for full details. I was involved in gathering history, physical examination and development of the plan of care.

## 2023-04-20 NOTE — TELEPHONE ENCOUNTER
RN reviewed patient chart. Pre procedure instructions were reviewed with patient during clinic visit.    Lyric Johns RNCC

## 2023-04-20 NOTE — LETTER
"4/20/2023       RE: Nadira Preez  [address]  [facility] [address]       Dear Colleague,    Thank you for referring your patient, Nadira Perez, to the [facility] at [facility]. Please see a copy of my visit note below.      Pain Clinic New Patient Consult Note:    Referring Provider: Jacobo   Primary care provider: Matilde Quiñonez    Nadira Perez is a 72 year old y.o. old female who presents to the pain clinic with pain in her low back \"around the SIJ area\".  Her other pains have been relatively stable and unchanged. Right now she is doing pilate's regularly and taking warm showers/baths that do help her low back. The pain is constantly present but with intermittent flares. The pain is mostly left sided below the lumbar spine. She has had this kind of pain in the past and discussed it with her provider who recommended she see us for formal evaluation. She has had prior lumbar surgery which is just above where her pain is. She describes her pain characteristics below. She denies any other acute changes in b/b habits or strength.      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 She has chronic low back pain and had L5/S1 fusion 2022. She had a CT of her lumbar spine 5/2022 negative for metastatic disease. Peripheral neuropathy precedes cancer diagnosis    HPI:  Patient Supplied Answers To the  Pain Questionnaire      4/19/2023     7:20 PM    Pain -  Patient Entered Questionnaire/Answers   How would you describe the pain cramping    sharp    pressure   Which of the following worsen your pain lying down    standing    walking    exercise    coughing / sneezing   Which of the following improve or reduce your pain sitting    medication    relaxation    thinking about something else   What number " best describes your average pain for the past week:  0 = No pain  to  10 = Worst pain imaginable 4   What number best describes your LOWEST pain in past 24 hours:  0 = No pain  to  10 = Worst pain imaginable 3   What number best describes your WORST pain in past 24 hours:  0 = No pain  to  10 = Worst pain imaginable 6   When is your pain worst Constant   What non-medicine treatments have you already had for your pain physical therapy    acupuncture    spine injections (shots)    surgery    exercise        Pain treatments:  Herbal medicines: No  Physical therapy: Yes  Chiropractor: No (not recommended)  Pain physician: No  Surgery: Yes  Biofeedback: Yes  Acupuncture: Yes    Tests/Imaging reviewed with the patient:  None    Significant Medical History:   Past Medical History:   Diagnosis Date    Arthritis     Bone disease     Breast cancer (H)     H/O kyphoplasty     Hearing problem     History of kidney stones     History of radiation therapy     Hyperlipemia     Hypertension     Hypopotassemia     Kidney problem     Lymph edema     Medullary sponge kidney     Osteopenia     PONV (postoperative nausea and vomiting)     Reduced vision     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    Thyroid disease           Past Surgical History:  Past Surgical History:   Procedure Laterality Date    ABDOMEN SURGERY      ovarian cyst, mesh    ARTHRODESIS WRIST Right     ARTHRODESIS WRIST  02/14/2013    Procedure: ARTHRODESIS WRIST;  left wrist scaphoid excision, four bone fusion, iliac crest bone graft  ( Mac with block);  Surgeon: Av Mendez MD;  [facility]    BIOPSY      skin, vaginal    BLEPHAROPLASTY BILATERAL Bilateral 5/6/2022    Procedure: UPPER BLEPHAROPLASTY, BILATERAL;  Surgeon: Jemal Sanchez MD;  Location: [facility] OR    CATARACT IOL, RT/LT Right 03/13/2018    CATARACT IOL, RT/LT Left 02/20/2018    COLONOSCOPY  12/24/2013    Procedure: COMBINED COLONOSCOPY, SINGLE BIOPSY/POLYPECTOMY BY BIOPSY;   COLONOSCOPY;  Surgeon: Dom Alvarez MD;  Location:  GI    COSMETIC BLEPHAROPLASTY LOWER LIDS BILATERAL Bilateral 5/6/2022    Procedure: BLEPHAROPLASTY, LOWER EYELID, BILATERAL, COSMETIC;  Surgeon: Jemal Sanchez MD;  [place] [facility] [place]    ESOPHAGOSCOPY, GASTROSCOPY, DUODENOSCOPY (EGD), COMBINED N/A 11/23/2016    Procedure: COMBINED ESOPHAGOSCOPY, GASTROSCOPY, DUODENOSCOPY (EGD);  Surgeon: Quinten Feliciano MD;  Location:  GI    EXTERNAL EAR SURGERY      right    EYE SURGERY      radial keratomy    FUSION, SPINE, INTERBODY, OBLIQUE ANTERIOR AND LUMBAR, 2 LEVELS, POST APPROACH, USING OTS N/A 11/18/2021    Procedure: Part 1: Oblique Anterior Interbody Fusion at Lumbar 5 to sacral 1 with use of Bone Morphogenic Protein,;  Surgeon: Serge Ramirez MD;  [facility]: [facility]    GRAFT BONE FROM ILIAC CREST  02/14/2013    Procedure: GRAFT BONE FROM ILIAC CREST;  mac with block and local infilitration;  Surgeon: Av Mendez MD;  [facility]    HC BREATH HYDROGEN TEST N/A 10/14/2016    Procedure: HYDROGEN BREATH TEST;  Surgeon: Cheri Barron MD;  Location:  GI    HERNIA REPAIR      umbilical age 18 mos.    HYSTERECTOMY TOTAL ABDOMINAL  05/03/2000    MASTECTOMY MODIFIED RADICAL Bilateral     bilateral; right breast prophylactic    OPTICAL TRACKING SYSTEM FUSION POSTERIOR SPINE LUMBAR N/A 11/18/2021    Procedure: Open Posterior Instrumented Spinal Fusion at Lumbar 5 to Sacral 1, with grimm aguayo osteotomy, use of O-Arm/Stealth;  Surgeon: Serge Ramirez MD;  [facility]: [facility]    PARATHYROIDECTOMY  09/23/2004    R SUPERIOR    PARATHYROIDECTOMY  09/23/2004    parathyroid resection, subtotal    RELEASE CARPAL TUNNEL Right 12/2/2021    Procedure: RIGHT CARPAL TUNNEL RELEASE, RIGHT WRIST HARDWARE REMOVAL, RIGHT CARPAL BOSS EXCISION;  Surgeon: Rolan Conley MD;  [place] [facility] [place]    REMOVE HARDWARE HAND  09/24/2013    Procedure: REMOVE HARDWARE HAND;  Left Hand Screw Removal        RHINOPLASTY  1968    thyr proc skin closed cosmetic manner by subcuticular stitch  01/23/2009    THYROPLASTY  10/09/2009    TONSILLECTOMY  1977    VITRECTOMY PARSPLANA WITH 25 GAUGE SYSTEM Left 6/20/2022    Procedure: LEFT EYE VITRECTOMY, PARS PLANA APPROACH, USING 25-GAUGE INSTRUMENTS, laser;  Surgeon: Felix Carlos MD;  Location: [facility] OR    WRIST SURGERY      wrist arthrodesis          Family History:  Family History   Problem Relation Age of Onset    Neurologic Disorder Mother         Anuerysm of Cerebral Artery, Dementia    Diabetes Mother     Thyroid Disease Mother         ,    Cerebrovascular Disease Mother     Dementia Mother     Osteoporosis Mother     Heart Disease Father         AAA    Hypertension Father     Circulatory Brother         Perihperal Neurophathy    Diabetes Maternal Grandmother     Asthma Maternal Grandmother     Chronic Obstructive Pulmonary Disease Maternal Grandfather         father    Asthma Maternal Grandfather     Diabetes Maternal Aunt         x2    Melanoma Maternal Aunt     Glaucoma Maternal Aunt     Breast Cancer Cousin     Dementia Other     Cancer Other         malignant melanoma    Hypertension Other     Hypertension Other     Cerebrovascular Disease Other     Cerebrovascular Disease Other     Obesity Other     Respiratory Other     Cancer Other     Diabetes Other     Asthma Other     Macular Degeneration No family hx of     Coronary Artery Disease No family hx of     Hyperlipidemia No family hx of     Kidney Disease No family hx of     Thrombosis No family hx of     Arthritis No family hx of     Depression No family hx of     Mental Illness No family hx of     Substance Abuse No family hx of     Cystic Fibrosis No family hx of     Early Death No family hx of     Coronary Artery Disease Early Onset No family hx of     Heart Failure No family hx of     Bleeding Diathesis No family hx of     Ovarian Cancer No family hx of     Uterine Cancer No family hx of      Prostate Cancer No family hx of     Colorectal Cancer No family hx of     Pancreatic Cancer No family hx of     Lung Cancer No family hx of     Other Cancer No family hx of     Autoimmune Disease No family hx of     Unknown/Adopted No family hx of     Genetic Disorder No family hx of     Bleeding Disorder No family hx of     Clotting Disorder No family hx of     Anesthesia Reaction No family hx of           Social History:  Social History     Socioeconomic History    Marital status:      Spouse name: Not on file    Number of children: Not on file    Years of education: Not on file    Highest education level: Not on file   Occupational History    Not on file   Tobacco Use    Smoking status: Never    Smokeless tobacco: Never   Vaping Use    Vaping status: Not on file   Substance and Sexual Activity    Alcohol use: Not Currently     Alcohol/week: 7.0 standard drinks of alcohol     Types: 7 Glasses of wine per week     Comment: Rare to occasional    Drug use: Yes     Types: Marijuana    Sexual activity: Not Currently     Partners: Male     Birth control/protection: Abstinence   Other Topics Concern     Service No    Blood Transfusions Not Asked    Caffeine Concern No    Occupational Exposure No    Hobby Hazards No    Sleep Concern No    Stress Concern No    Weight Concern No    Special Diet No    Back Care No    Exercise Yes     Comment: walks 4-6x  week for 20-30 min. each    Bike Helmet Not Asked    Seat Belt Yes    Self-Exams Yes    Parent/sibling w/ CABG, MI or angioplasty before 65F 55M? No   Social History Narrative    .  Recently retired. 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        She lives with a renter.         She exercises 50 minutes three times a week.     Social Determinants of Health     Financial Resource Strain: Not on file   Food Insecurity: Not on file   Transportation Needs: Not on file   Physical  Activity: Not on file   Stress: Not on file   Social Connections: Not on file   Intimate Partner Violence: Not At Risk (4/14/2023)    Humiliation, Afraid, Rape, and Kick questionnaire     Fear of Current or Ex-Partner: No     Emotionally Abused: No     Physically Abused: No     Sexually Abused: No   Housing Stability: Not on file     Social History     Social History Narrative    .  Recently retired. She has retired from [employer] and hopes to focus on a home care program, [employer],  for the elderly in the future.        She lives with a renter.         She exercises 50 minutes three times a week.          Allergies:  Allergies   Allergen Reactions    Erythromycin Nausea    Penicillins Hives     Around age 6 - doesn't recall full reaction, mother told her it was hives.     Has tolerated cephalsporins.        Current Medications:   Current Outpatient Medications   Medication Sig Dispense Refill    acetaminophen (TYLENOL) 500 MG tablet Take 500-1,000 mg by mouth every 6 hours as needed for mild pain      amLODIPine (NORVASC) 10 MG tablet Take 1 tablet (10 mg) by mouth daily 90 tablet 3    Ascorbic Acid (VITAMIN C) 500 MG CHEW Take 1 tablet by mouth daily      atorvastatin (LIPITOR) 80 MG tablet Take 1 tablet (80 mg) by mouth daily 90 tablet 3    Cholecalciferol (VITAMIN D3) 50 MCG (2000 UT) CAPS TAKE 3 CAPSULES (6,000 UNITS) BY MOUTH DAILY. 270 capsule 3    CRANBERRY EXTRACT PO Take 650 mg by mouth daily ~10 am  Takes 1      diclofenac (VOLTAREN) 1 % topical gel APPLY 4 GRAMS TO KNEES OR 2 GRAMS TO HANDS FOUR TIMES DAILY USING ENCLOSED DOSING CARD. 100 g 3    gabapentin (NEURONTIN) 300 MG capsule Take 4 capsules (1,200 mg) by mouth 3 times daily 1080 capsule 3    HYDROcodone-acetaminophen (NORCO) 5-325 MG tablet Take 1 tab for breakthrough pain 1-2 times a week. 30 tablet 0    hydrOXYzine (VISTARIL) 25 MG capsule Take 1 capsule (25 mg) by mouth At Bedtime (Patient not taking: Reported on 4/14/2023) 90 capsule  3    ibuprofen (ADVIL/MOTRIN) 200 MG tablet Take 400 mg by mouth 3 times daily as needed for pain (Patient not taking: Reported on 4/14/2023)      levothyroxine (SYNTHROID/LEVOTHROID) 112 MCG tablet TAKE 1/2 TABLET BY MOUTH DAILY 45 tablet 3    lisinopril (ZESTRIL) 40 MG tablet Take 1 tablet (40 mg) by mouth daily 90 tablet 3    magnesium 250 MG tablet Take 1 tablet by mouth daily      melatonin 5 MG tablet Take 10 mg by mouth At Bedtime       methocarbamol (ROBAXIN) 500 MG tablet Take 1 tablet (500 mg) by mouth 3 times daily (Patient not taking: Reported on 4/14/2023) 40 tablet 4    metoprolol succinate ER (TOPROL XL) 50 MG 24 hr tablet Take 1 tablet (50 mg) by mouth daily 90 tablet 3    Multiple Vitamin (MULTI-VITAMIN) per tablet Take 1 tablet by mouth daily      naloxone (NARCAN) 4 MG/0.1ML nasal spray Spray 1 spray (4 mg) into one nostril alternating nostrils once as needed for opioid reversal every 2-3 minutes until assistance arrives (Patient not taking: Reported on 4/14/2023) 0.2 mL 0    ondansetron (ZOFRAN-ODT) 4 MG ODT tab Take 1 tablet (4 mg) by mouth every 12 hours as needed for nausea 180 tablet 3    senna-docusate (SENOKOT-S/PERICOLACE) 8.6-50 MG tablet Take 2 tablets by mouth 2 times daily (Patient not taking: Reported on 4/14/2023) 30 tablet 0    spironolactone (ALDACTONE) 25 MG tablet Take 2 tablets (50 mg) by mouth daily 180 tablet 3    Vaginal Lubricant (REPHRESH) GEL Place 2 g vaginally every 3 days 2 g 3          Current Pain Medications:  Medications related to Pain Management (From now, onward)      None             Past Pain Medications:  Vicodin 30 tabs for 3 months  Methocarbamol PRN  Gabapentin  Tylenol    Blood thinner:  None    Work History:    Current work status: Retired    Review of Systems:  Review of Systems   HENT: Positive for hearing loss. Negative for ear discharge, ear pain, nosebleeds, sinus pain, sore throat and tinnitus.    Eyes: Negative for double vision, pain and redness.    Cardiovascular: Positive for palpitations. Negative for chest pain and orthopnea.   Genitourinary: Positive for urgency. Negative for dysuria, flank pain and hematuria.   Musculoskeletal: Positive for back pain, myalgias and neck pain.   Neurological: Positive for dizziness, tingling and weakness. Negative for tremors, speech change, seizures, loss of consciousness and headaches.   Endo/Heme/Allergies: Does not bruise/bleed easily.   Psychiatric/Behavioral: Negative for depression and memory loss. The patient has insomnia. The patient is not nervous/anxious.          Physical Exam:     Vitals:    04/20/23 0918   BP: 131/80   Pulse: 90   Resp: 16   Temp: 98.7  F (37.1  C)   TempSrc: Oral   SpO2: 98%   Weight: 86.6 kg (191 lb)       General Appearance: No distress, seated comfortably  Mood: Euthymic  HE ENT: Non constricted pupils  Respiratory: Non labored breathing  CVS: Regular rate and rhythm  GI: Soft, non distended, no TTP  Skin: No rashes over exposed skin  NeuroMSK: BL LE WNL, SILT  Gait: antalgic, ambulates with rollator for assistance    Pain specific exam:  Hang finger positive on right   CINDY positive especially with limited flexion and distraction of the SI joint was painful.   SI Shear +  SI Compression +     Laboratory results:  Recent Labs   Lab Test 04/12/23  1310 01/11/23  1056 10/27/22  1428   NA  --  141 140   POTASSIUM  --  4.3 4.4   CHLORIDE  --  105 105   CO2  --  23 22   ANIONGAP  --  13 13   GLC  --  117* 104*   BUN  --  20.7 19.0   CR 0.82 0.75 0.76   RAMBO 9.9 10.0  10.0 9.4  9.4       CBC RESULTS:   Recent Labs   Lab Test 10/27/22  1428   WBC 7.8   RBC 4.15   HGB 11.9   HCT 37.3   MCV 90   MCH 28.7   MCHC 31.9   RDW 13.8            Imaging:       ASSESSMENT AND PLAN:     Encounter Diagnosis:  Sacroiliac joint pain  Lumbosacral spondylosis  L5/S1 fusion  Spinal stenosis  Peripheral neuropathy    Nadira Perez is a 72 year old y.o. old female who presents to the pain clinic  with a complex history and complaints of left SIJ pain. Both her history and exam are consistent with SIJ pain as she suspects. It is also likely that her SIJ pain worsened with the lumbar fusion. We discussed options as far as medications but she is on a stable regimen so we will not make any adjustments to it at this time. We will proceed with a left SIJ injection and reassess efficacy at follow up. In the meantime she will continue with PT as scheduled for gait and balance training.     I have summarized the patient s past medical history, discussed their clinical findings and the potential differential diagnosis with the patient. Significant past medical history pertinent to the patient s current condition includes SIJ dysfunction. The differential diagnosis discussed with the patient are listed above. I have discussed anatomy and possible sources of the pain using models and/or pictures (diagrams). I have discussed multi- disciplinary pain management options withthe patient as pertaining to their case as detailed above. The pain management options we discussed included, but were not limited to the recommendations below.  I also discussed with patient the risks, benefits and alternatives to each pain management option.  All of the patient s questions and concerns were answered to the best of my ability.    RECOMMENDATIONS:     1. Medications: No changes made this visit.    2. Procedure: We are scheduling the patient for left SIJ injection in the procedure suite. Risks/benefits/alternatives were discussed.     I also discussed with the patient that the possible risks involved with interventional treatment included, but are not limited to, no pain control, worsened pain, stroke,seizure, spinal headache, allergic reactions, introduction of infection or bleeding which may lead to emergent spine surgery, nerve damage, paralysis oreven death.    3. Physical therapy: Continue as scheduled for balance and gait  training      Follow up: 2-3 weeks after the injection      Live Shipman M.D.  [facility] Pain Fellow        Answers for HPI/ROS submitted by the patient on 4/19/2023  DIANNE 7 TOTAL SCORE: 0  General Symptoms: No  Skin Symptoms: No  HENT Symptoms: Yes  EYE SYMPTOMS: Yes  HEART SYMPTOMS: Yes  LUNG SYMPTOMS: No  INTESTINAL SYMPTOMS: No  URINARY SYMPTOMS: Yes  GYNECOLOGIC SYMPTOMS: Yes  BREAST SYMPTOMS: No  SKELETAL SYMPTOMS: Yes  BLOOD SYMPTOMS: Yes  NERVOUS SYSTEM SYMPTOMS: Yes  MENTAL HEALTH SYMPTOMS: Yes  Congestion: No  Trouble swallowing: Yes   Voice hoarseness: No  Mouth sores: Yes  Tooth pain: No  Gum tenderness: Yes  Bleeding gums: Yes  Change in taste: No  Change in sense of smell: No  Dry mouth: Yes  Hearing aid used: Yes  Neck lump: No  Vision loss: No  Dry eyes: Yes  Watery eyes: No  Eye bulging: No  Flashing of lights: No  Floaters: Yes  Crossed eyes: No  Tunnel Vision: No  Yellowing of eyes: No  Eye irritation: Yes  Pain in legs with walking: Yes  Fingers or toes appear blue: No  High blood pressure: Yes  Low blood pressure: No  Fainting: No  Murmurs: Yes  Pacemaker: No  Varicose veins: Yes  Wake up at night with shortness of breath: No  Light-headedness: Yes  Exercise intolerance: No  Trouble holding urine or incontinence: Yes  Increased frequency of urination: Yes  Decreased frequency of urination: No  Frequent nighttime urination: Yes  Difficulty emptying bladder: Yes  Bleeding or spotting between periods: No  Heavy or painful periods: No  Irregular periods: No  Vaginal discharge: No  Hot flashes: No  Vaginal dryness: Yes  Genital ulcers: No  Reduced libido: Yes  Painful intercourse: Yes  Difficulty with sexual arousal: Yes  Post-menopausal bleeding: No  Swollen joints: Yes  Joint pain: Yes  Bone pain: Yes  Muscle cramps: Yes  Muscle weakness: Yes  Joint stiffness: Yes  Bone fracture: No  Edema or swelling: Yes  Anemia: No  Swollen glands: No  Trouble with coordination: Yes  Difficulty walking:  Yes  Paralysis: No  Numbness: Yes  Trouble thinking or concentrating: No  Mood changes: No  Panic attacks: No    ATTENDING ATTESTATION  I saw the patient along with the pain medicine fellow Dr. Live Tovar. Please see his note above for full details. I was involved in gathering history, physical examination and development of the plan of care.           Again, thank you for allowing me to participate in the care of your patient.      Sincerely,    Natasha Umaña MD

## 2023-04-25 ENCOUNTER — OFFICE VISIT (OUTPATIENT)
Dept: ORTHOPEDICS | Facility: CLINIC | Age: 73
End: 2023-04-25
Payer: OTHER MISCELLANEOUS

## 2023-04-25 VITALS — BODY MASS INDEX: 29.98 KG/M2 | HEIGHT: 67 IN | WEIGHT: 191 LBS

## 2023-04-25 DIAGNOSIS — M19.039 WRIST ARTHRITIS: Primary | ICD-10-CM

## 2023-04-25 PROCEDURE — 99214 OFFICE O/P EST MOD 30 MIN: CPT | Performed by: STUDENT IN AN ORGANIZED HEALTH CARE EDUCATION/TRAINING PROGRAM

## 2023-04-25 ASSESSMENT — PAIN SCALES - GENERAL: PAINLEVEL: MILD PAIN (3)

## 2023-04-25 NOTE — PROGRESS NOTES
"Orthopaedic Surgery Hand Clinic Progress Note    Patient Name: Nadira Perez  MRN: [id number]  : 1950  Date: 2023    Date of Surgery: 21    Surgery Performed: 1) Open right carpal tunnel release  2) Removal of buried implant (deep) right wrist    Subjective:     23: Patient was last seen 23 at which time I provided bilateral radiocarpal injections for wrist OA and left middle finger trigger injection. She is still having relief from all three injections. MF triggering seems to have resolved. Main concern today is cramping of the fingers. Ongoing 6 months, worse over the last 2. She states the thumb, index, middle, and ring fingers seem to lock up with certain hand postures like when putting on earrings or wearing her bra. She does not have classic pain or paresthesias in the median nerve distribution. No nocturnal symptoms. She has been wearing a wrist brace, using Tylenol and gabapentin.    Update 2023 Patient last seen on 22. At which time patient had cortisone injection for A1 pulley of left ring finger and bilateral radiocarpal injections. She states these are quite effective for a while after the injections. Patient was to continue wearing  She returns today for repeat radiocarpal injections. She has developed dorsal radial swelling over the carpus more pronounced on the right. She has developed locking/catching of the left long finger as well.    22: Patient was last seen 5/3/22 at which time I provided her with bilateral radiocarpal injections and new braces. She notes since last visit she sustained a fall around , and sustained nondisplaced fractures to left ring finger and right thumb for which she was treated by Dr. Miguel. She has healed fine from those, states thumb IP still a bit stiff. She has continued to have bilateral wrist pain and notes new onset of \"spasms\" in left hand with gripping/ grasping objections within the last 2-3 weeks. She states " that her left small and ring fingers seem to lock and spasm with /finger flexion. She has to forcibly extend it sometimes to make them straight with pain. She states this must have happened 6-7 times over the last 2 months.      Objective:  Wt 86.6 kg (191 lb)   BMI 29.91 kg/m      General: alert, appropriate, NAD  HEENT: NC/AT  CV: RRR by pulse  Pulm: Unlabored on RA  MSK:  BUE   Volar and dorsal incisions c/d/i, no e/o infection  No tenderness of the right thumb  Moderate pain to palpation dorsal central wrists bilaterally  Mild effusion and capsular swelling over bilateral dorsal radial wrist over the carpus  +TTP over left index finger A1 pulley with mild crepitus, full flexion/extension, able to make full fist, no malrotation or scissoring. No visible triggering  Minimal tenderness over A1 pulley off left middle  ROM baseline  Intact EPL/FPL/APB/HI  Diminished sensation median nerve distribution baseline  Negative Tinel's at the left carpal tunnel, no crepitus, there is fullness of the volar wrist proximal to carpal tunnel likely consistent with flexor tenosynovitis  +TTP scaphoid tubercle where an osteophyte is felt.  WWP CR< 2s    Imaging:  None new. XRs of left ring finger and right hand from 5/4/2022 and 5/31/2022 reviewed.  These demonstrate nondisplaced fractures of the right thumb distal phalanx and left ring distal phalanx that have healed.      Repeat XRs of bilateral wrists 2/14/23 demonstrates Unchanged alignment and appearance of 4 corner fusion on the left with ulnar subluxation of the carpus. Significant volar carpal osteophytes. 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 Right s/p hardware removal for second failed 4 corner fusion, and  carpal tunnel release.    Patient's left hand cramping is not consistent with carpal tunnel syndrome. EMG/NCS 10/21/21 also negative on the left.     It seems more likely that the volar carpal osteophytes might be causing impingement on the flexor tendons, which would account for the catching and the fullness along the flexor tendons proximal to the carpal tunnel.    I have ordered an ultrasound of the left wrist and carpal tunnel to evaluate for osteophytic impingement on the flexor tendons.     She will return after the ultrasound to discuss results, if not diagnostic, may consider an MRI of the left wrist. We might consider injections of the left carpal tunnel and index finger pending ultrasound results.    She is a candidate for bilateral repeat radiocarpal injections in May (last injections 2/14/23).    Continue brace wear. Continue radiocarpal injections every 4-6 months for symptomatic control, until she decides that she wants to proceed with total wrist fusion on the right.    Rolan Conley MD    Hand, Upper Extremity & Microvascular Surgery  Department of Orthopedic Surgery  [facility]

## 2023-04-25 NOTE — LETTER
2023         RE: Nadira Perez  [id number]  [facility] [address]        Dear Colleague,    Thank you for referring your patient, Nadira Perez, to the [facility] [place]. Please see a copy of my visit note below.    Orthopaedic Surgery Hand Clinic Progress Note    Patient Name: Nadira Perez  MRN: [id number]  : 1950  Date: 2023    Date of Surgery: 21    Surgery Performed: 1) Open right carpal tunnel release  2) Removal of buried implant (deep) right wrist    Subjective:     23: Patient was last seen 23 at which time I provided bilateral radiocarpal injections for wrist OA and left middle finger trigger injection. She is still having relief from all three injections. MF triggering seems to have resolved. Main concern today is cramping of the fingers. Ongoing 6 months, worse over the last 2. She states the thumb, index, middle, and ring fingers seem to lock up with certain hand postures like when putting on earrings or wearing her bra. She does not have classic pain or paresthesias in the median nerve distribution. No nocturnal symptoms. She has been wearing a wrist brace, using Tylenol and gabapentin.    Update 2023 Patient last seen on 22. At which time patient had cortisone injection for A1 pulley of left ring finger and bilateral radiocarpal injections. She states these are quite effective for a while after the injections. Patient was to continue wearing  She returns today for repeat radiocarpal injections. She has developed dorsal radial swelling over the carpus more pronounced on the right. She has developed locking/catching of the left long finger as well.    22: Patient was last seen 5/3/22 at which time I provided her with bilateral radiocarpal injections and new braces. She notes since last visit she sustained a fall around , and sustained nondisplaced fractures to left ring finger and right thumb for  "which she was treated by Dr. Miguel. She has healed fine from those, states thumb IP still a bit stiff. She has continued to have bilateral wrist pain and notes new onset of \"spasms\" in left hand with gripping/ grasping objections within the last 2-3 weeks. She states that her left small and ring fingers seem to lock and spasm with /finger flexion. She has to forcibly extend it sometimes to make them straight with pain. She states this must have happened 6-7 times over the last 2 months.      Objective:  Wt 86.6 kg (191 lb)   BMI 29.91 kg/m      General: alert, appropriate, NAD  HEENT: NC/AT  CV: RRR by pulse  Pulm: Unlabored on RA  MSK:  BUE   Volar and dorsal incisions c/d/i, no e/o infection  No tenderness of the right thumb  Moderate pain to palpation dorsal central wrists bilaterally  Mild effusion and capsular swelling over bilateral dorsal radial wrist over the carpus  +TTP over left index finger A1 pulley with mild crepitus, full flexion/extension, able to make full fist, no malrotation or scissoring. No visible triggering  Minimal tenderness over A1 pulley off left middle  ROM baseline  Intact EPL/FPL/APB/HI  Diminished sensation median nerve distribution baseline  Negative Tinel's at the left carpal tunnel, no crepitus, there is fullness of the volar wrist proximal to carpal tunnel likely consistent with flexor tenosynovitis  +TTP scaphoid tubercle where an osteophyte is felt.  WWP CR< 2s    Imaging:  None new. XRs of left ring finger and right hand from 5/4/2022 and 5/31/2022 reviewed.  These demonstrate nondisplaced fractures of the right thumb distal phalanx and left ring distal phalanx that have healed.      Repeat XRs of bilateral wrists 2/14/23 demonstrates Unchanged alignment and appearance of 4 corner fusion on the left with ulnar subluxation of the carpus. Significant volar carpal osteophytes. 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 Right s/p hardware removal for second failed 4 corner fusion, and carpal tunnel release.    Patient's left hand cramping is not consistent with carpal tunnel syndrome. EMG/NCS 10/21/21 also negative on the left.     It seems more likely that the volar carpal osteophytes might be causing impingement on the flexor tendons, which would account for the catching and the fullness along the flexor tendons proximal to the carpal tunnel.    I have ordered an ultrasound of the left wrist and carpal tunnel to evaluate for osteophytic impingement on the flexor tendons.     She will return after the ultrasound to discuss results, if not diagnostic, may consider an MRI of the left wrist. We might consider injections of the left carpal tunnel and index finger pending ultrasound results.    She is a candidate for bilateral repeat radiocarpal injections in May (last injections 2/14/23).    Continue brace wear. Continue radiocarpal injections every 4-6 months for symptomatic control, until she decides that she wants to proceed with total wrist fusion on the right.    Rolan Conley MD    Hand, Upper Extremity & Microvascular Surgery  Department of Orthopedic Surgery  [facility]            Again, thank you for allowing me to participate in the care of your patient.        Sincerely,        Rolan Conley MD

## 2023-04-27 ENCOUNTER — HOSPITAL ENCOUNTER (OUTPATIENT)
Facility: AMBULATORY SURGERY CENTER | Age: 73
Discharge: HOME | End: 2023-04-27
Attending: ANESTHESIOLOGY | Admitting: ANESTHESIOLOGY
Payer: COMMERCIAL

## 2023-04-27 ENCOUNTER — ANCILLARY PROCEDURE (OUTPATIENT)
Dept: RADIOLOGY | Facility: AMBULATORY SURGERY CENTER | Age: 73
End: 2023-04-27
Attending: ANESTHESIOLOGY
Payer: COMMERCIAL

## 2023-04-27 VITALS
HEART RATE: 88 BPM | DIASTOLIC BLOOD PRESSURE: 73 MMHG | OXYGEN SATURATION: 98 % | BODY MASS INDEX: 29.51 KG/M2 | SYSTOLIC BLOOD PRESSURE: 119 MMHG | WEIGHT: 188 LBS | TEMPERATURE: 98 F | HEIGHT: 67 IN | RESPIRATION RATE: 16 BRPM

## 2023-04-27 DIAGNOSIS — M46.1 SACROILIITIS (H): ICD-10-CM

## 2023-04-27 DIAGNOSIS — R52 PAIN: ICD-10-CM

## 2023-04-27 PROCEDURE — 27096 INJECT SACROILIAC JOINT: CPT | Mod: LT | Performed by: ANESTHESIOLOGY

## 2023-04-27 PROCEDURE — G0260 INJ FOR SACROILIAC JT ANESTH: HCPCS | Mod: LT

## 2023-04-27 RX ORDER — BUPIVACAINE HYDROCHLORIDE 2.5 MG/ML
INJECTION, SOLUTION EPIDURAL; INFILTRATION; INTRACAUDAL PRN
Status: DISCONTINUED | OUTPATIENT
Start: 2023-04-27 | End: 2023-04-27 | Stop reason: HOSPADM

## 2023-04-27 RX ORDER — METHYLPREDNISOLONE ACETATE 40 MG/ML
INJECTION, SUSPENSION INTRA-ARTICULAR; INTRALESIONAL; INTRAMUSCULAR; SOFT TISSUE PRN
Status: DISCONTINUED | OUTPATIENT
Start: 2023-04-27 | End: 2023-04-27 | Stop reason: HOSPADM

## 2023-04-27 RX ORDER — LIDOCAINE HYDROCHLORIDE 10 MG/ML
INJECTION, SOLUTION EPIDURAL; INFILTRATION; INTRACAUDAL; PERINEURAL PRN
Status: DISCONTINUED | OUTPATIENT
Start: 2023-04-27 | End: 2023-04-27 | Stop reason: HOSPADM

## 2023-04-27 RX ORDER — IOPAMIDOL 408 MG/ML
INJECTION, SOLUTION INTRATHECAL PRN
Status: DISCONTINUED | OUTPATIENT
Start: 2023-04-27 | End: 2023-04-27 | Stop reason: HOSPADM

## 2023-04-27 NOTE — DISCHARGE INSTRUCTIONS
Home Care Instructions after a Sacroiliac Joint Injection        Activity  -You may resume most normal activity levels with the exception of strenuous activity. It is important for us to know if your pain with normal activity is relieved after this injection.  -DO NOT shower for 24 hours  -DO NOT remove bandaid for 24 hours    Pain  -You may experience soreness at the injection site for one or two days  -You may use an ice pack for 20 minutes every 2 hours for the first 24 hours  -You may use a heating pad after the first 24 hours  -You may use Tylenol (acetaminophen) every 4 hours or other pain medicines as directed by your physician    You may experience numbness radiating into your legs or arms (depending on the procedure location). This numbness may last several hours. Until sensation returns to normal; please use caution in walking, climbing stairs, and stepping out of your vehicle, etc.    DID YOU RECEIVE STEROIDS TODAY?  Yes    Common side effects of steroids:  Not everyone will experience corticosteroid side effects. If side effects are experienced, they will gradually subside in the 7-10 day period following an injection. Most common side effects include:  -Flushed face and/or chest  -Feeling of warmth, particularly in the face but could be an overall feeling of warmth  -Increased blood sugar in diabetic patients  -Menstrual irregularities my occur. If taking hormone-based birth control an alternate method of birth control is recommended  -Sleep disturbances and/or mood swings are possible  -Leg cramps      PLEASE KEEP TRACK OF YOUR SYMPTOMS AND NOTE YOUR IMPROVEMENT FOR YOUR DOCTOR.     Please contact us if you have:  -Severe pain  -Fever more than 101.5 degrees Fahrenheit  -Signs of infection at the injection site (redness, swelling, or drainage)    FOR PAIN CENTER PATIENTS:  If you have questions, please contact the Pain Clinic at [phone] Option #1 between the hours of 7:00 am and 3:00 pm Monday  through Friday. After office hours you can contact the on call provider by dialing [phone]. If you need immediate attention, we recommend that you go to a hospital emergency room or dial [phone].

## 2023-04-27 NOTE — H&P
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     Corneal opacity     Hypothyroidism     Osteopenia, unspecified location     Aromatase inhibitor use     Chronic pain of right knee     DDD (degenerative disc disease), lumbar     Degenerative scoliosis in adult patient     Carpal tunnel syndrome of right wrist     Peripheral neuropathy     Spinal stenosis of lumbar region with neurogenic claudication     Spondylolisthesis of lumbar region     Brain lesion     Dermatochalasis of both upper eyelids     S/P spinal fusion     Chronic bilateral low back pain     PVD (posterior vitreous detachment), left eye     Vitreous opacities of left eye     Closed nondisplaced fracture of lateral malleolus of left fibula, initial encounter     Acute left ankle pain     Sacroiliitis (H)       Past Surgical History:    Past Surgical History:   Procedure Laterality Date     ABDOMEN SURGERY      ovarian cyst, mesh     ARTHRODESIS WRIST Right      ARTHRODESIS WRIST  02/14/2013    Procedure: ARTHRODESIS WRIST;  left wrist scaphoid excision, four bone fusion, iliac crest bone graft  ( Mac with block);  Surgeon: Av Mendez MD;  [facility]     BIOPSY      skin, vaginal     BLEPHAROPLASTY BILATERAL Bilateral 5/6/2022    Procedure: UPPER BLEPHAROPLASTY, BILATERAL;  Surgeon: Jemal Sanchez MD;  Location: [facility] OR     CATARACT  IOL, RT/LT Right 03/13/2018     CATARACT IOL, RT/LT Left 02/20/2018     COLONOSCOPY  12/24/2013    Procedure: COMBINED COLONOSCOPY, SINGLE BIOPSY/POLYPECTOMY BY BIOPSY;  COLONOSCOPY;  Surgeon: Dom Alvarez MD;  Location:  GI     COSMETIC BLEPHAROPLASTY LOWER LIDS BILATERAL Bilateral 5/6/2022    Procedure: BLEPHAROPLASTY, LOWER EYELID, BILATERAL, COSMETIC;  Surgeon: Jemal Sanchez MD;  Location: [facility] OR     ESOPHAGOSCOPY, GASTROSCOPY, DUODENOSCOPY (EGD), COMBINED N/A 11/23/2016    Procedure: COMBINED ESOPHAGOSCOPY, GASTROSCOPY, DUODENOSCOPY (EGD);  Surgeon: Quinten Feliciano MD;  Location:  GI     EXTERNAL EAR SURGERY      right     EYE SURGERY      radial keratomy     FUSION, SPINE, INTERBODY, OBLIQUE ANTERIOR AND LUMBAR, 2 LEVELS, POST APPROACH, USING OTS N/A 11/18/2021    Procedure: Part 1: Oblique Anterior Interbody Fusion at Lumbar 5 to sacral 1 with use of Bone Morphogenic Protein,;  Surgeon: Serge Ramirez MD;  Location: [facility]     GRAFT BONE FROM ILIAC CREST  02/14/2013    Procedure: GRAFT BONE FROM ILIAC CREST;  mac with block and local infilitration;  Surgeon: Av Mendez MD;  [facility]      BREATH HYDROGEN TEST N/A 10/14/2016    Procedure: HYDROGEN BREATH TEST;  Surgeon: Cheri Barron MD;  Location:  GI     HERNIA REPAIR      umbilical age 18 mos.     HYSTERECTOMY TOTAL ABDOMINAL  05/03/2000     MASTECTOMY MODIFIED RADICAL Bilateral     bilateral; right breast prophylactic     OPTICAL TRACKING SYSTEM FUSION POSTERIOR SPINE LUMBAR N/A 11/18/2021    Procedure: Open Posterior Instrumented Spinal Fusion at Lumbar 5 to Sacral 1, with grimm aguayo osteotomy, use of O-Arm/Stealth;  Surgeon: Serge Ramirez MD;  Location: [facility] OR     PARATHYROIDECTOMY  09/23/2004    R SUPERIOR     PARATHYROIDECTOMY  09/23/2004    parathyroid resection, subtotal     RELEASE CARPAL TUNNEL Right 12/2/2021    Procedure: RIGHT CARPAL TUNNEL RELEASE, RIGHT WRIST  HARDWARE REMOVAL, RIGHT CARPAL BOSS EXCISION;  Surgeon: Rolan Conley MD;  [place] [facility]     REMOVE HARDWARE HAND  09/24/2013    Procedure: REMOVE HARDWARE HAND;  Left Hand Screw Removal        RHINOPLASTY  1968     thyr proc skin closed cosmetic manner by subcuticular stitch  01/23/2009     THYROPLASTY  10/09/2009     TONSILLECTOMY  1977     VITRECTOMY PARSPLANA WITH 25 GAUGE SYSTEM Left 6/20/2022    Procedure: LEFT EYE VITRECTOMY, PARS PLANA APPROACH, USING 25-GAUGE INSTRUMENTS, laser;  Surgeon: Felix Carlos MD;  [place] [facility] [place]     WRIST SURGERY      wrist arthrodesis       Past Medical History:   Past Medical History:   Diagnosis Date     Arthritis      Bone disease      Breast cancer (H)      H/O kyphoplasty      Hearing problem      History of kidney stones      History of radiation therapy      Hyperlipemia      Hypertension      Hypopotassemia      Kidney problem      Lymph edema      Medullary sponge kidney      Osteopenia      PONV (postoperative nausea and vomiting)      Reduced vision      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     Thyroid disease        Social History:   Social History     Tobacco Use     Smoking status: Never     Smokeless tobacco: Never   Vaping Use     Vaping status: Not on file   Substance Use Topics     Alcohol use: Not Currently     Alcohol/week: 7.0 standard drinks of alcohol     Types: 7 Glasses of wine per week     Comment: Rare to occasional       Family History:   Family History   Problem Relation Age of Onset     Neurologic Disorder Mother         Anuerysm of Cerebral Artery, Dementia     Diabetes Mother      Thyroid Disease Mother         ,     Cerebrovascular Disease Mother      Dementia Mother      Osteoporosis Mother      Heart Disease Father         AAA     Hypertension Father      Circulatory Brother         Perihperal Neurophathy     Diabetes Maternal Grandmother      Asthma Maternal Grandmother      Chronic Obstructive Pulmonary  Disease Maternal Grandfather         father     Asthma Maternal Grandfather      Diabetes Maternal Aunt         x2     Melanoma Maternal Aunt      Glaucoma Maternal Aunt      Breast Cancer Cousin      Dementia Other      Cancer Other         malignant melanoma     Hypertension Other      Hypertension Other      Cerebrovascular Disease Other      Cerebrovascular Disease Other      Obesity Other      Respiratory Other      Cancer Other      Diabetes Other      Asthma Other      Macular Degeneration No family hx of      Coronary Artery Disease No family hx of      Hyperlipidemia No family hx of      Kidney Disease No family hx of      Thrombosis No family hx of      Arthritis No family hx of      Depression No family hx of      Mental Illness No family hx of      Substance Abuse No family hx of      Cystic Fibrosis No family hx of      Early Death No family hx of      Coronary Artery Disease Early Onset No family hx of      Heart Failure No family hx of      Bleeding Diathesis No family hx of      Ovarian Cancer No family hx of      Uterine Cancer No family hx of      Prostate Cancer No family hx of      Colorectal Cancer No family hx of      Pancreatic Cancer No family hx of      Lung Cancer No family hx of      Other Cancer No family hx of      Autoimmune Disease No family hx of      Unknown/Adopted No family hx of      Genetic Disorder No family hx of      Bleeding Disorder No family hx of      Clotting Disorder No family hx of      Anesthesia Reaction No family hx of        Allergies:   Allergies   Allergen Reactions     Erythromycin Nausea     Penicillins Hives     Around age 6 - doesn't recall full reaction, mother told her it was hives.     Has tolerated cephalsporins.        Active Medications:   Current Outpatient Medications   Medication Sig Dispense Refill     acetaminophen (TYLENOL) 500 MG tablet Take 500-1,000 mg by mouth every 6 hours as needed for mild pain       amLODIPine (NORVASC) 10 MG tablet Take 1  tablet (10 mg) by mouth daily 90 tablet 3     Ascorbic Acid (VITAMIN C) 500 MG CHEW Take 1 tablet by mouth daily       atorvastatin (LIPITOR) 80 MG tablet Take 1 tablet (80 mg) by mouth daily 90 tablet 3     Cholecalciferol (VITAMIN D3) 50 MCG (2000 UT) CAPS TAKE 3 CAPSULES (6,000 UNITS) BY MOUTH DAILY. 270 capsule 3     CRANBERRY EXTRACT PO Take 650 mg by mouth daily ~10 am  Takes 1       diclofenac (VOLTAREN) 1 % topical gel APPLY 4 GRAMS TO KNEES OR 2 GRAMS TO HANDS FOUR TIMES DAILY USING ENCLOSED DOSING CARD. 100 g 3     gabapentin (NEURONTIN) 300 MG capsule Take 4 capsules (1,200 mg) by mouth 3 times daily 1080 capsule 3     HYDROcodone-acetaminophen (NORCO) 5-325 MG tablet Take 1 tab for breakthrough pain 1-2 times a week. 30 tablet 0     levothyroxine (SYNTHROID/LEVOTHROID) 112 MCG tablet TAKE 1/2 TABLET BY MOUTH DAILY 45 tablet 3     lisinopril (ZESTRIL) 40 MG tablet Take 1 tablet (40 mg) by mouth daily 90 tablet 3     magnesium 250 MG tablet Take 1 tablet by mouth daily       melatonin 5 MG tablet Take 10 mg by mouth At Bedtime        metoprolol succinate ER (TOPROL XL) 50 MG 24 hr tablet Take 1 tablet (50 mg) by mouth daily 90 tablet 3     ondansetron (ZOFRAN-ODT) 4 MG ODT tab Take 1 tablet (4 mg) by mouth every 12 hours as needed for nausea 180 tablet 3     spironolactone (ALDACTONE) 25 MG tablet Take 2 tablets (50 mg) by mouth daily 180 tablet 3     Vaginal Lubricant (REPHRESH) GEL Place 2 g vaginally every 3 days 2 g 3     hydrOXYzine (VISTARIL) 25 MG capsule Take 1 capsule (25 mg) by mouth At Bedtime (Patient not taking: Reported on 4/14/2023) 90 capsule 3     ibuprofen (ADVIL/MOTRIN) 200 MG tablet Take 400 mg by mouth 3 times daily as needed for pain (Patient not taking: Reported on 4/14/2023)       methocarbamol (ROBAXIN) 500 MG tablet Take 1 tablet (500 mg) by mouth 3 times daily (Patient not taking: Reported on 4/14/2023) 40 tablet 4     Multiple Vitamin (MULTI-VITAMIN) per tablet Take 1 tablet by  "mouth daily       naloxone (NARCAN) 4 MG/0.1ML nasal spray Spray 1 spray (4 mg) into one nostril alternating nostrils once as needed for opioid reversal every 2-3 minutes until assistance arrives (Patient not taking: Reported on 4/14/2023) 0.2 mL 0     senna-docusate (SENOKOT-S/PERICOLACE) 8.6-50 MG tablet Take 2 tablets by mouth 2 times daily (Patient not taking: Reported on 4/14/2023) 30 tablet 0       Systemic Review:   CONSTITUTIONAL: NEGATIVE for fever, chills, change in weight  ENT/MOUTH: NEGATIVE for ear, mouth and throat problems  RESP: NEGATIVE for significant cough or SOB  CV: NEGATIVE for chest pain, palpitations or peripheral edema    Physical Examination:   Vital Signs: /87   Pulse 88   Resp 17   Ht 1.702 m (5' 7\")   Wt 85.3 kg (188 lb)   SpO2 98%   BMI 29.44 kg/m    GENERAL: healthy, alert and no distress  NECK: no adenopathy, no asymmetry, masses, or scars  RESP: lungs clear to auscultation - no rales, rhonchi or wheezes  CV: regular rate and rhythm, normal S1 S2, no S3 or S4, no murmur, click or rub, no peripheral edema and peripheral pulses strong  ABDOMEN: soft, nontender, no hepatosplenomegaly, no masses and bowel sounds normal  MS: no gross musculoskeletal defects noted, no edema  FABERS +  SI joint ttp    Plan: Appropriate to proceed as scheduled.      Natasha Umaña MD  4/27/2023          "

## 2023-04-27 NOTE — OP NOTE
PROCEDURE REPORT    Chief Complaint:  Back pain    Pre-operative Diagnosis:  1. Sacroiliitis, not elsewhere classified    Post-operative Diagnosis:  1. Sacroiliitis, not elsewhere classified    Procedure:  1. Left sacroiliac joint injection  2. Fluoroscopy for needle guidance     Anesthesia:  Local anesthesia    Indications / Pre-Operative Plan:  The patient has disabling non radicular axial pain, and has failed a variety of conservative therapeutic interventions including activity modification, physical therapy, and medications.Examination is suggestive of sacroiliac joint dysfunction with focal tenderness and positive sacroiliac joint provocation testing. Further diagnostic and hopefully therapeutic sacroiliac joint block is deemed medically necessary. The risks, alternatives and benefits were explained to the patient in detail. The patient agreed with the plan.    Patient was examined by me prior to the procedure. Examination of heart, lung and mental status were all within acceptable limits. The patient has been assessed, examined and cleared for the planned procedure and level of anesthesia in an ambulatory surgery center.    Description of Procedure:  An additional intraoperative timeout, specifically to confirm accurate localization, was conducted by the attending physician. The patient remained awake and alert throughout the procedure. The patient was placed in the prone position. The skin was prepped with chlorhexidine and sterile drapes were applied. The skin and soft tissues were anesthetized with Lidocaine 1%. A 22 gauge 3.5 inch needle was inserted percutaneously and advanced under fluoroscopic guidance using dorsal oblique, AP, and lateral projections. The needle tip entered the left sacroiliac joint. No paraesthesias occurred and aspiration was negative Fluoroscopy was also used in multiple planes to ensure proper and safe placement of the needle. Isovue 200 M was slowly injected to confirm  intra-articular spread and lack of intravascular spread. The needle was injected with 40 mg methylprednisolone (40 mg/mL) and bupivacaine (0.25%) 1 mL and removed.     The procedure was NOT repeated on the left side.     A sterile bandage was applied. The patient did not experience any hemodynamic or neurologic sequelae. The patient was transferred to the recovery area. Post operative instructions were explained and given to the patient. The patient was discharged in stable health and given follow up instructions.    Post-Operative Plan:  The patient will monitor pain relief from today's procedure. They will call the clinic for any problems related to today's procedure. A copy of our written post-procedure instructions was provided. They will return to clinic as scheduled.

## 2023-04-28 ENCOUNTER — VIRTUAL VISIT (OUTPATIENT)
Dept: PHARMACY | Facility: CLINIC | Age: 73
End: 2023-04-28
Payer: COMMERCIAL

## 2023-04-28 DIAGNOSIS — I10 BENIGN ESSENTIAL HYPERTENSION: Primary | ICD-10-CM

## 2023-04-28 DIAGNOSIS — R52 PAIN: ICD-10-CM

## 2023-04-28 PROCEDURE — 99607 MTMS BY PHARM ADDL 15 MIN: CPT | Mod: VID | Performed by: PHARMACIST

## 2023-04-28 PROCEDURE — 99606 MTMS BY PHARM EST 15 MIN: CPT | Mod: VID | Performed by: PHARMACIST

## 2023-04-28 NOTE — PATIENT INSTRUCTIONS
"Recommendations from today's MTM visit:                                                    MTM (medication therapy management) is a service provided by a clinical pharmacist designed to help you get the most of out of your medicines.   Today we reviewed what your medicines are for, how to know if they are working, that your medicines are safe and how to make your medicine regimen as easy as possible.      1. Change lisinopril to night time dosing. If this causes your daytime blood pressure to increase try splitting the dose in half and taking it twice a day.   2. Decrease amlodipine to 10 mg.     Follow-up: Return in about 4 weeks (around 5/26/2023) for Follow up, with me.    It was great speaking with you today.  I value your experience and would be very thankful for your time in providing feedback in our clinic survey. In the next few days, you may receive an email or text message from [employer] with a link to a survey related to your  clinical pharmacist.\"     To schedule another MTM appointment, please call the clinic directly or you may call the MTM scheduling line at [phone] or toll-free at [phone].     My Clinical Pharmacist's contact information:                                                      Please feel free to contact me with any questions or concerns you have.      Willis So, Pharm. D., [facility]  Medication Therapy Management Pharmacist  Direct Voicemail: [phone]     "

## 2023-04-28 NOTE — PROGRESS NOTES
Medication Therapy Management (MTM) Encounter    ASSESSMENT:                            Medication Adherence/Access: No issues identified    Hypertension: Patient is meeting blood pressure goal of < 130/80mmHg. However since she seems to not be getting a full 24 hours of benefit from her medications with some higher blood pressure in the morning we can transition her lisinopril to night or consider splitting her medications into twice a day administration.    Max dose of amlodipine is 10 mg so she should decrease back down to that amount.     Pain: Stable off ibuprofen    PLAN:                            1. Change lisinopril to night time dosing. If this causes your daytime blood pressure to increase try splitting the dose in half and taking it twice a day.   2. Decrease amlodipine to 10 mg.     Follow-up: Return in about 4 weeks (around 5/26/2023) for Follow up, with me.      SUBJECTIVE/OBJECTIVE:                          Nadira Perez is a 72 year old female contacted via secure video for a follow-up visit.  Today's visit is a follow-up MTM visit from 3/31/23     Reason for visit: MTM follow-up on changes.    Allergies/ADRs: Reviewed in chart  Past Medical History: Reviewed in chart  Tobacco: She reports that she has never smoked. She has never used smokeless tobacco.  Alcohol: not discussed today      Medication Adherence/Access: no issues reported    Hypertension: Current medications include metoprolol XL 50 mg at 10 AM, she takes the amlodipine 10 mg in the evening (was taking a second dose in the morning, she takes lisinopril 40 mg at 800 am, and spironolactone 50 mg daily at 10 am.  She reports that its sometimes hard to get all three in. Patient does self-monitor blood pressure. Home BP monitoring in range of 150/100s mmHg average in the morning after she takes her medications it is in range. Sometimes she will forget her amlodipine at night but even when she takes it the blood pressure is still high in  the morning. Reports that her edema has been more pronounced since December.      Reports that these increased blood pressures have happened over the past month. Reports no changes in her diet. She is currently working on losing weight with pillates and step bike.      BP Readings from Last 3 Encounters:   04/27/23 119/73   04/20/23 131/80   04/14/23 116/79       Pulse Readings from Last 3 Encounters:   04/27/23 88   04/20/23 90   04/14/23 80        Pain: Currently off ibuprofen and acetaminophen is 1000 mg three times a day, hydrocodone-acetaminophen 5-325 mg for breakthrough pain 1-2 times per week ((has a naloxone just in case).. Reports that pain has been a lot better since increasing the gabapentin to 1200 mg three times a day. Takes the methocarbamol three times a day as needed and has been forgetting to take it the last few days. Got an injection recently. When she does take the methocarbamol she does not get drowsy. Reports she has been active and her pain is doing well.     Today's Vitals: There were no vitals taken for this visit.  ----------------      I spent 20 minutes with this patient today. All changes were made via collaborative practice agreement with MERCEDES Kyle CNP. A copy of the visit note was provided to the patient's provider(s).    A summary of these recommendations was sent via [employer].    Willis So, Pharm. D., BCACP  Medication Therapy Management Pharmacist  Direct Voicemail: [phone]     Telemedicine Visit Details  Type of service:  Video Conference via [employer]  Start Time: 9:31 am  End Time: 9:51 am     Medication Therapy Recommendations  Benign essential hypertension    Current Medication: amLODIPine (NORVASC) 10 MG tablet   Rationale: Does not understand instructions - Adherence - Adherence   Recommendation: Decrease Dose   Status: Accepted - no CPA Needed          Current Medication: lisinopril (ZESTRIL) 40 MG tablet   Rationale: Condition refractory to medication -  Ineffective medication - Effectiveness   Recommendation: Change Administration Time   Status: Accepted - no CPA Needed

## 2023-05-17 ENCOUNTER — MYC MEDICAL ADVICE (OUTPATIENT)
Dept: ORTHOPEDICS | Facility: CLINIC | Age: 73
End: 2023-05-17
Payer: COMMERCIAL

## 2023-05-17 ASSESSMENT — ENCOUNTER SYMPTOMS
EYE IRRITATION: 0
MUSCLE CRAMPS: 1
BACK PAIN: 1
PARALYSIS: 0
TREMORS: 1
HOARSE VOICE: 0
SMELL DISTURBANCE: 0
DOUBLE VISION: 0
TASTE DISTURBANCE: 0
HYPERTENSION: 1
PALPITATIONS: 1
HEADACHES: 0
JAUNDICE: 0
SEIZURES: 0
HEMATURIA: 0
STIFFNESS: 1
SINUS CONGESTION: 0
JOINT SWELLING: 1
WEAKNESS: 1
LEG PAIN: 1
DYSURIA: 0
ARTHRALGIAS: 1
NECK PAIN: 1
HEARTBURN: 1
EYE REDNESS: 0
NECK MASS: 1
EYE WATERING: 0
ABDOMINAL PAIN: 0
HOT FLASHES: 0
NERVOUS/ANXIOUS: 0
MUSCLE WEAKNESS: 1
DISTURBANCES IN COORDINATION: 1
RECTAL PAIN: 0
CONSTIPATION: 0
ORTHOPNEA: 0
VOMITING: 0
PANIC: 0
DIZZINESS: 1
FLANK PAIN: 0
DIARRHEA: 0
NUMBNESS: 1
TINGLING: 1
INSOMNIA: 1
MYALGIAS: 1
SLEEP DISTURBANCES DUE TO BREATHING: 0
SINUS PAIN: 0
BLOATING: 0
SORE THROAT: 0
BLOOD IN STOOL: 0
EXERCISE INTOLERANCE: 1
DIFFICULTY URINATING: 1
HYPOTENSION: 0
DECREASED CONCENTRATION: 0
SPEECH CHANGE: 0
DEPRESSION: 0
MEMORY LOSS: 0
LOSS OF CONSCIOUSNESS: 0
NAUSEA: 0
TROUBLE SWALLOWING: 1
SYNCOPE: 0
EYE PAIN: 0
BRUISES/BLEEDS EASILY: 0
BOWEL INCONTINENCE: 0
SWOLLEN GLANDS: 1
LIGHT-HEADEDNESS: 1

## 2023-05-17 ASSESSMENT — ANXIETY QUESTIONNAIRES
2. NOT BEING ABLE TO STOP OR CONTROL WORRYING: SEVERAL DAYS
1. FEELING NERVOUS, ANXIOUS, OR ON EDGE: SEVERAL DAYS
6. BECOMING EASILY ANNOYED OR IRRITABLE: NOT AT ALL
8. IF YOU CHECKED OFF ANY PROBLEMS, HOW DIFFICULT HAVE THESE MADE IT FOR YOU TO DO YOUR WORK, TAKE CARE OF THINGS AT HOME, OR GET ALONG WITH OTHER PEOPLE?: NOT DIFFICULT AT ALL
4. TROUBLE RELAXING: NOT AT ALL
GAD7 TOTAL SCORE: 3
5. BEING SO RESTLESS THAT IT IS HARD TO SIT STILL: NOT AT ALL
IF YOU CHECKED OFF ANY PROBLEMS ON THIS QUESTIONNAIRE, HOW DIFFICULT HAVE THESE PROBLEMS MADE IT FOR YOU TO DO YOUR WORK, TAKE CARE OF THINGS AT HOME, OR GET ALONG WITH OTHER PEOPLE: NOT DIFFICULT AT ALL
7. FEELING AFRAID AS IF SOMETHING AWFUL MIGHT HAPPEN: NOT AT ALL
7. FEELING AFRAID AS IF SOMETHING AWFUL MIGHT HAPPEN: NOT AT ALL
3. WORRYING TOO MUCH ABOUT DIFFERENT THINGS: SEVERAL DAYS
GAD7 TOTAL SCORE: 3

## 2023-05-18 ENCOUNTER — OFFICE VISIT (OUTPATIENT)
Dept: ANESTHESIOLOGY | Facility: CLINIC | Age: 73
End: 2023-05-18
Attending: ANESTHESIOLOGY
Payer: COMMERCIAL

## 2023-05-18 VITALS
OXYGEN SATURATION: 98 % | RESPIRATION RATE: 18 BRPM | SYSTOLIC BLOOD PRESSURE: 109 MMHG | DIASTOLIC BLOOD PRESSURE: 72 MMHG | BODY MASS INDEX: 30.07 KG/M2 | TEMPERATURE: 99 F | HEART RATE: 86 BPM | WEIGHT: 192 LBS

## 2023-05-18 DIAGNOSIS — M54.16 LUMBAR RADICULOPATHY: Primary | ICD-10-CM

## 2023-05-18 PROCEDURE — 99214 OFFICE O/P EST MOD 30 MIN: CPT | Performed by: ANESTHESIOLOGY

## 2023-05-18 PROCEDURE — G0463 HOSPITAL OUTPT CLINIC VISIT: HCPCS

## 2023-05-18 ASSESSMENT — ENCOUNTER SYMPTOMS
NAUSEA: 0
ORTHOPNEA: 0
MYALGIAS: 1
ABDOMINAL PAIN: 0
PALPITATIONS: 1
EYE REDNESS: 0
DOUBLE VISION: 0
WEAKNESS: 1
HEARTBURN: 1
BACK PAIN: 1
FLANK PAIN: 0
NERVOUS/ANXIOUS: 0
NECK PAIN: 1
SPEECH CHANGE: 0
SORE THROAT: 0
VOMITING: 0
DIARRHEA: 0
HEADACHES: 0
TREMORS: 1
DIZZINESS: 1
MEMORY LOSS: 0
LOSS OF CONSCIOUSNESS: 0
TINGLING: 1
HEMATURIA: 0
INSOMNIA: 1
DYSURIA: 0
BLOOD IN STOOL: 0
EYE PAIN: 0
CONSTIPATION: 0
BRUISES/BLEEDS EASILY: 0
SEIZURES: 0
SINUS PAIN: 0
DEPRESSION: 0

## 2023-05-18 ASSESSMENT — PAIN SCALES - GENERAL: PAINLEVEL: MODERATE PAIN (4)

## 2023-05-18 NOTE — PROGRESS NOTES
Pain clinic follow up note    HPI:   Nadira Perez is a 72 year old year old female  who presents to the pain clinic with persistent low back pain, s/p SI joint injection which was not helpful    Patient Supplied Answers To the  Pain Questionnaire      5/17/2023     8:40 PM    Pain -  Patient Entered Questionnaire/Answers   How would you describe the pain burning    cramping    sharp    numbness    cutting    dull, aching    throbbing    pressure   Which of the following worsen your pain standing    sitting    walking    exercise   Which of the following improve or reduce your pain lying down    exercise    medication    relaxation    thinking about something else   What number best describes your average pain for the past week:  0 = No pain  to  10 = Worst pain imaginable 6   What number best describes your LOWEST pain in past 24 hours:  0 = No pain  to  10 = Worst pain imaginable 3   What number best describes your WORST pain in past 24 hours:  0 = No pain  to  10 = Worst pain imaginable 6   When is your pain worst AM    PM    Night    Constant   What non-medicine treatments have you already had for your pain pain clinic    physical therapy    relaxation training    acupuncture    spine injections (shots)    other nerve blocks    surgery    exercise             The patient reports lack of short term or long term pain relief after the SI joint injection. She is here to discuss alternatives.     ROS:  Review of Systems   HENT: Positive for hearing loss. Negative for ear discharge, ear pain, nosebleeds, sinus pain, sore throat and tinnitus.    Eyes: Negative for double vision, pain and redness.   Cardiovascular: Positive for palpitations. Negative for chest pain and orthopnea.   Gastrointestinal: Positive for heartburn. Negative for abdominal pain, blood in stool, constipation, diarrhea, melena, nausea and vomiting.   Genitourinary: Positive for urgency. Negative for dysuria, flank pain and hematuria.    Musculoskeletal: Positive for back pain, myalgias and neck pain.   Neurological: Positive for dizziness, tingling, tremors and weakness. Negative for speech change, seizures, loss of consciousness and headaches.   Endo/Heme/Allergies: Does not bruise/bleed easily.   Psychiatric/Behavioral: Negative for depression and memory loss. The patient has insomnia. The patient is not nervous/anxious.    All other systems reviewed and are negative.    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     Thyroid disease           Past Surgical History:  Past Surgical History:   Procedure Laterality Date     ABDOMEN SURGERY      ovarian cyst, mesh     ARTHRODESIS WRIST Right      ARTHRODESIS WRIST  02/14/2013    Procedure: ARTHRODESIS WRIST;  left wrist scaphoid excision, four bone fusion, iliac crest bone graft  ( Mac with block);  Surgeon: Av Mendez MD;  [facility]     BIOPSY      skin, vaginal     BLEPHAROPLASTY BILATERAL Bilateral 5/6/2022    Procedure: UPPER BLEPHAROPLASTY, BILATERAL;  Surgeon: Jemal Sanchez MD;  Location: [facility] OR     CATARACT IOL, RT/LT Right 03/13/2018     CATARACT IOL, RT/LT Left 02/20/2018     COLONOSCOPY  12/24/2013    Procedure: COMBINED COLONOSCOPY, SINGLE BIOPSY/POLYPECTOMY BY BIOPSY;  COLONOSCOPY;  Surgeon: Dom Alvarez MD;  Location:  GI     COSMETIC BLEPHAROPLASTY LOWER LIDS BILATERAL Bilateral 5/6/2022    Procedure: BLEPHAROPLASTY, LOWER EYELID, BILATERAL, COSMETIC;  Surgeon: Jemal Sanchez MD;  Location: [facility] OR      ESOPHAGOSCOPY, GASTROSCOPY, DUODENOSCOPY (EGD), COMBINED N/A 11/23/2016    Procedure: COMBINED ESOPHAGOSCOPY, GASTROSCOPY, DUODENOSCOPY (EGD);  Surgeon: Quinten Feliciano MD;  Location:  GI     EXTERNAL EAR SURGERY      right     EYE SURGERY      radial keratomy     FUSION, SPINE, INTERBODY, OBLIQUE ANTERIOR AND LUMBAR, 2 LEVELS, POST APPROACH, USING OTS N/A 11/18/2021    Procedure: Part 1: Oblique Anterior Interbody Fusion at Lumbar 5 to sacral 1 with use of Bone Morphogenic Protein,;  Surgeon: Serge Ramirez MD;  [facility]     GRAFT BONE FROM ILIAC CREST  02/14/2013    Procedure: GRAFT BONE FROM ILIAC CREST;  mac with block and local infilitration;  Surgeon: Av Mendez MD;  [facility]     HC BREATH HYDROGEN TEST N/A 10/14/2016    Procedure: HYDROGEN BREATH TEST;  Surgeon: Cheri Barron MD;  Location:  GI     HERNIA REPAIR      umbilical age 18 mos.     HYSTERECTOMY TOTAL ABDOMINAL  05/03/2000     INJECT JOINT SACROILIAC Left 4/27/2023    Procedure: Left sacroiliac joint steroid injection with fluoroscopy;  Surgeon: Natasha Umaña MD;  Location: [facility] OR     MASTECTOMY MODIFIED RADICAL Bilateral     bilateral; right breast prophylactic     OPTICAL TRACKING SYSTEM FUSION POSTERIOR SPINE LUMBAR N/A 11/18/2021    Procedure: Open Posterior Instrumented Spinal Fusion at Lumbar 5 to Sacral 1, with grimm aguayo osteotomy, use of O-Arm/Stealth;  Surgeon: Serge Ramirez MD;  [facility]     PARATHYROIDECTOMY  09/23/2004    R SUPERIOR     PARATHYROIDECTOMY  09/23/2004    parathyroid resection, subtotal     RELEASE CARPAL TUNNEL Right 12/2/2021    Procedure: RIGHT CARPAL TUNNEL RELEASE, RIGHT WRIST HARDWARE REMOVAL, RIGHT CARPAL BOSS EXCISION;  Surgeon: Rolan Conley MD;  Location: [facility] OR     REMOVE HARDWARE HAND  09/24/2013    Procedure: REMOVE HARDWARE HAND;  Left Hand Screw Removal        RHINOPLASTY  1968     thyr proc skin closed cosmetic manner by  subcuticular stitch  01/23/2009     THYROPLASTY  10/09/2009     TONSILLECTOMY  1977     VITRECTOMY PARSPLANA WITH 25 GAUGE SYSTEM Left 6/20/2022    Procedure: LEFT EYE VITRECTOMY, PARS PLANA APPROACH, USING 25-GAUGE INSTRUMENTS, laser;  Surgeon: Felix Carlos MD;  Location: [facility] OR     WRIST SURGERY      wrist arthrodesis          Family History:  Family History   Problem Relation Age of Onset     Neurologic Disorder Mother         Anuerysm of Cerebral Artery, Dementia     Diabetes Mother      Thyroid Disease Mother         ,     Cerebrovascular Disease Mother      Dementia Mother      Osteoporosis Mother      Heart Disease Father         AAA     Hypertension Father      Circulatory Brother         Perihperal Neurophathy     Diabetes Maternal Grandmother      Asthma Maternal Grandmother      Chronic Obstructive Pulmonary Disease Maternal Grandfather         father     Asthma Maternal Grandfather      Diabetes Maternal Aunt         x2     Melanoma Maternal Aunt      Glaucoma Maternal Aunt      Breast Cancer Cousin      Dementia Other      Cancer Other         malignant melanoma     Hypertension Other      Hypertension Other      Cerebrovascular Disease Other      Cerebrovascular Disease Other      Obesity Other      Respiratory Other      Cancer Other      Diabetes Other      Asthma Other      Macular Degeneration No family hx of      Coronary Artery Disease No family hx of      Hyperlipidemia No family hx of      Kidney Disease No family hx of      Thrombosis No family hx of      Arthritis No family hx of      Depression No family hx of      Mental Illness No family hx of      Substance Abuse No family hx of      Cystic Fibrosis No family hx of      Early Death No family hx of      Coronary Artery Disease Early Onset No family hx of      Heart Failure No family hx of      Bleeding Diathesis No family hx of      Ovarian Cancer No family hx of      Uterine Cancer No family hx of      Prostate Cancer No  family hx of      Colorectal Cancer No family hx of      Pancreatic Cancer No family hx of      Lung Cancer No family hx of      Other Cancer No family hx of      Autoimmune Disease No family hx of      Unknown/Adopted No family hx of      Genetic Disorder No family hx of      Bleeding Disorder No family hx of      Clotting Disorder No family hx of      Anesthesia Reaction No family hx of           Social History:  Social History     Socioeconomic History     Marital status:      Spouse name: Not on file     Number of children: Not on file     Years of education: Not on file     Highest education level: Not on file   Occupational History     Not on file   Tobacco Use     Smoking status: Never     Passive exposure: Never     Smokeless tobacco: Never   Vaping Use     Vaping status: Not on file   Substance and Sexual Activity     Alcohol use: Not Currently     Alcohol/week: 7.0 standard drinks of alcohol     Types: 7 Glasses of wine per week     Comment: Rare to occasional     Drug use: Yes     Types: Marijuana     Sexual activity: Not Currently     Partners: Male     Birth control/protection: Abstinence   Other Topics Concern      Service No     Blood Transfusions Not Asked     Caffeine Concern No     Occupational Exposure No     Hobby Hazards No     Sleep Concern No     Stress Concern No     Weight Concern No     Special Diet No     Back Care No     Exercise Yes     Comment: walks 4-6x  week for 20-30 min. each     Bike Helmet Not Asked     Seat Belt Yes     Self-Exams Yes     Parent/sibling w/ CABG, MI or angioplasty before 65F 55M? No   Social History Narrative    .  Recently retired. 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        She lives with a renter.         She exercises 50 minutes three times a week.     Social Determinants of Health     Financial Resource Strain: Not on file   Food Insecurity: Not on file    Transportation Needs: Not on file   Physical Activity: Not on file   Stress: Not on file   Social Connections: Not on file   Intimate Partner Violence: Not At Risk (4/14/2023)    Humiliation, Afraid, Rape, and Kick questionnaire      Fear of Current or Ex-Partner: No      Emotionally Abused: No      Physically Abused: No      Sexually Abused: No   Housing Stability: Not on file     Social History     Social History Narrative    .  Recently retired. She has retired from [employer] and hopes to focus on a home care program, "[employer]",  for the elderly in the future.        She lives with a renter.         She exercises 50 minutes three times a week.          Allergies:  Allergies   Allergen Reactions     Erythromycin Nausea     Penicillins Hives     Around age 6 - doesn't recall full reaction, mother told her it was hives.     Has tolerated cephalsporins.        Current Medications:   Current Outpatient Medications   Medication Sig Dispense Refill     acetaminophen (TYLENOL) 500 MG tablet Take 500-1,000 mg by mouth every 6 hours as needed for mild pain       amLODIPine (NORVASC) 10 MG tablet Take 1 tablet (10 mg) by mouth daily 90 tablet 3     Ascorbic Acid (VITAMIN C) 500 MG CHEW Take 1 tablet by mouth daily       atorvastatin (LIPITOR) 80 MG tablet Take 1 tablet (80 mg) by mouth daily 90 tablet 3     Cholecalciferol (VITAMIN D3) 50 MCG (2000 UT) CAPS TAKE 3 CAPSULES (6,000 UNITS) BY MOUTH DAILY. 270 capsule 3     CRANBERRY EXTRACT PO Take 650 mg by mouth daily ~10 am  Takes 1       diclofenac (VOLTAREN) 1 % topical gel APPLY 4 GRAMS TO KNEES OR 2 GRAMS TO HANDS FOUR TIMES DAILY USING ENCLOSED DOSING CARD. 100 g 3     gabapentin (NEURONTIN) 300 MG capsule Take 4 capsules (1,200 mg) by mouth 3 times daily 1080 capsule 3     HYDROcodone-acetaminophen (NORCO) 5-325 MG tablet Take 1 tab for breakthrough pain 1-2 times a week. 30 tablet 0     hydrOXYzine (VISTARIL) 25 MG capsule Take 1 capsule (25 mg) by mouth At  Bedtime (Patient not taking: Reported on 4/14/2023) 90 capsule 3     levothyroxine (SYNTHROID/LEVOTHROID) 112 MCG tablet TAKE 1/2 TABLET BY MOUTH DAILY 45 tablet 3     lisinopril (ZESTRIL) 40 MG tablet Take 1 tablet (40 mg) by mouth daily 90 tablet 3     magnesium 250 MG tablet Take 1 tablet by mouth daily       melatonin 5 MG tablet Take 10 mg by mouth At Bedtime        methocarbamol (ROBAXIN) 500 MG tablet Take 1 tablet (500 mg) by mouth 3 times daily (Patient not taking: Reported on 4/14/2023) 40 tablet 4     metoprolol succinate ER (TOPROL XL) 50 MG 24 hr tablet Take 1 tablet (50 mg) by mouth daily 90 tablet 3     Multiple Vitamin (MULTI-VITAMIN) per tablet Take 1 tablet by mouth daily       naloxone (NARCAN) 4 MG/0.1ML nasal spray Spray 1 spray (4 mg) into one nostril alternating nostrils once as needed for opioid reversal every 2-3 minutes until assistance arrives (Patient not taking: Reported on 4/14/2023) 0.2 mL 0     ondansetron (ZOFRAN-ODT) 4 MG ODT tab Take 1 tablet (4 mg) by mouth every 12 hours as needed for nausea 180 tablet 3     senna-docusate (SENOKOT-S/PERICOLACE) 8.6-50 MG tablet Take 2 tablets by mouth 2 times daily (Patient not taking: Reported on 4/14/2023) 30 tablet 0     spironolactone (ALDACTONE) 25 MG tablet Take 2 tablets (50 mg) by mouth daily 180 tablet 3     Vaginal Lubricant (REPHRESH) GEL Place 2 g vaginally every 3 days 2 g 3       Physical Exam:     /72   Pulse 86   Temp 99  F (37.2  C) (Oral)   Resp 18   Wt 87.1 kg (192 lb)   SpO2 98%   BMI 30.07 kg/m      General Appearance: No distress, seated comfortably  Mood: Euthymic  HE ENT: Non constricted pupils  Respiratory: Non labored breathing      ASSESSMENT AND PLAN:     Encounter Diagnosis:  Lumbar stenosis and neurogenic claudication  Lumbosacral spondylosis  L5/S1 fusion  Spinal stenosis  Peripheral neuropathy       Nadira Perez is a 72 year old year old female  who presents to the pain clinic with low back pain,  s/p failed SI joint injection    RECOMMENDATIONS:     1. Medications: No changes    2. Procedure: We are scheduling the patient for L3-4 interlaminar epidural steroid injection with fluoroscopy in the procedure suite. Risks/benefits/alternatives were discussed.     Follow up: 4-6 weeks after injection              Answers for HPI/ROS submitted by the patient on 5/17/2023  DIANNE 7 TOTAL SCORE: 3  General Symptoms: No  Skin Symptoms: No  HENT Symptoms: Yes  EYE SYMPTOMS: Yes  HEART SYMPTOMS: Yes  LUNG SYMPTOMS: No  INTESTINAL SYMPTOMS: Yes  URINARY SYMPTOMS: Yes  GYNECOLOGIC SYMPTOMS: Yes  BREAST SYMPTOMS: No  SKELETAL SYMPTOMS: Yes  BLOOD SYMPTOMS: Yes  NERVOUS SYSTEM SYMPTOMS: Yes  MENTAL HEALTH SYMPTOMS: Yes  Congestion: No  Trouble swallowing: Yes   Voice hoarseness: No  Mouth sores: Yes  Tooth pain: No  Gum tenderness: Yes  Bleeding gums: No  Change in taste: No  Change in sense of smell: No  Dry mouth: Yes  Hearing aid used: Yes  Neck lump: Yes  Vision loss: No  Dry eyes: Yes  Watery eyes: No  Eye bulging: No  Flashing of lights: No  Spots: Yes  Floaters: Yes  Crossed eyes: No  Tunnel Vision: No  Yellowing of eyes: No  Eye irritation: No  Pain in legs with walking: Yes  Fingers or toes appear blue: No  High blood pressure: Yes  Low blood pressure: No  Fainting: No  Murmurs: Yes  Pacemaker: No  Varicose veins: Yes  Wake up at night with shortness of breath: No  Light-headedness: Yes  Exercise intolerance: Yes  Bloating: No  Rectal or Anal pain: No  Fecal incontinence: No  Yellowing of skin or eyes: No  Vomit with blood: No  Change in stools: No  Trouble holding urine or incontinence: Yes  Increased frequency of urination: Yes  Decreased frequency of urination: No  Frequent nighttime urination: Yes  Difficulty emptying bladder: Yes  Bleeding or spotting between periods: No  Heavy or painful periods: No  Irregular periods: No  Vaginal discharge: No  Hot flashes: No  Vaginal dryness: Yes  Genital ulcers: No  Painful  intercourse: Yes  Difficulty with sexual arousal: Yes  Post-menopausal bleeding: No  Swollen joints: Yes  Joint pain: Yes  Bone pain: Yes  Muscle cramps: Yes  Muscle weakness: Yes  Joint stiffness: Yes  Bone fracture: Yes  Edema or swelling: Yes  Anemia: No  Swollen glands: Yes  Trouble with coordination: Yes  Difficulty walking: Yes  Paralysis: No  Numbness: Yes  Trouble thinking or concentrating: No  Mood changes: No  Panic attacks: No

## 2023-05-18 NOTE — PATIENT INSTRUCTIONS
Procedures:    Call to schedule your procedure: [phone] option #2  L3-4 interlaminar epidural steroid injection with fluoroscopy ( left> right)    Your pre-procedure instructions are below, please call our clinic if you have any questions.        Recommended Follow up:      Follow up in 2 months.       To speak with a nurse, schedule/reschedule/cancel a clinic appointment, or request a medication refill call: [phone].    You can also reach us by [employer]: [email or web address]        Procedure Information related to COVID-19     Please call [phone] option #2 to schedule, reschedule, or cancel your procedure appointment.   Phones are answered Monday - Friday from 08:00 - 4:30pm.  Leave a voicemail with your name, birth date, and phone number if no one is available to take your call.        You no longer need to test for COVID- 19 prior to your procedure/surgery, unless your physician specifically requests that you test. If you experience COVID symptoms or have tested positive for COVID-19 within 14 days of your scheduled surgery or procedure, please update our office right away and your procedure may have to be postponed.       The procedure center staff will call you several days before the procedure to review important information that you will need to know for the day of the procedure.     Please contact the clinic if you have further questions about this information [phone].        Information related to Scheduling and Pre-Procedure Instructions:    If you must reschedule your procedure more than two times, you must follow up in clinic before rescheduling again.    Preparing for your procedure    CAUTION - FAILURE TO FOLLOW THESE PRE-PROCEDURE INSTRUCTIONS WILL RESULT IN YOUR PROCEDURE BEING RESCHEDULED.    Your Procedure: L3-4 interlaminar epidural steroid injection with fluoroscopy ( left> right)        You must have a  take you home after your procedure.  Transportation by taxi or para-transit is okay as long as you have a responsible adult accompany you. You must provide your 's full name and contact number at time of check in.     Fasting Protocol Please have nothing to eat or drink 1 hour prior to arrival.     Medications If you take any medications, DO NOT STOP. Take your medications as usual the day of your procedure with a sip of water AT LEAST 2 HOURS PRIOR TO ARRIVAL.    Antibiotics If you are currently taking antibiotics, you must complete the entire dose 7 days prior to your scheduled procedure. You must be clear of any signs or symptoms of infection. If you begin antibiotics, please contact our clinic for instructions.     Fever, Chills, or Rash If you experience a fever of higher than 100 degrees, chills, rash, or open wounds during the one week before your procedure, please call the clinic to see if you may proceed with your procedure.      Medication Hold List  **Patients under Cardiology/Neurology care should consult their provider prior to the pain procedure to verify pre-procedure medication instructions. The information below contains general guidelines.**        Blood Thinners If you are taking daily ASPIRIN, PLAVIX, OR OTHER BLOOD THINNERS SUCH AS COUMADIN/WARFARIN, we will need your prescribing doctor to sign a release permitting you to stop these medications. Once approved by your prescribing doctor - STOP ALL BLOOD THINNERS BASED ON THE TIME TABLE BELOW PRIOR TO YOUR PROCEDURE. If you have been instructed to stop WARFARIN(COUMADIN), you must have an INR lab drawn the day before your procedure. . Your INR must be within normal limits before we can perform your injection. MEDICATIONS CAN BE RESTARTED AFTER YOUR PROCEDURE.    14 DAY HOLD  Ticlid (ticlopidine)    10 DAY HOLD  Effient (Prasugel)    3 DAY HOLD  Xarelto (rivaroxaban) 7 DAY HOLD  Anacin, Bufferin, Ecotrin, Excedrin, Aggrenox (Aspirin)  Brilinta (ticagrelor)  Coumadin  (Warfarin)  Pradexa (Dabigatran)  Elmiron (Pentosan)  Plavix (Clopidogrel Bisulfate)  Pletal (Cilostazol)    24 HOUR HOLD  Lovenox (enoxaparin)  Agrylin (Anagrelide)        Non-steroidal Anti-inflammatories (NSAIDs) DO NOT TAKE any non-steroidal anti-inflammatory medications (NSAIDs) listed on the table below. MEDICATIONS CAN BE RESTARTED AFTER YOUR PROCEDURE. Celebrex is OK to take and does not need to be discontinued.     Medications to stop:  3 DAY HOLD  Advil, Motrin (Ibuprofen)  Arthrotec (diciofenac sodium/misoprostol)  Clinoril (Sulindac)  Indocin (Indomethacin)  Lodine (Etodolac)  Toradol (Ketorolac)  Vicoprofen (Hydrocodone and Ibuprofen)  Voltaren (Diclofenac)  Apixaban (Eliquis)    14 DAY HOLD  Daypro (Oxaprozin)  Feldene (Piroxicam)   7 DAY HOLD  Aleve (Naproxen sodium)  Darvon compound (contains aspirin)  Naprosyn (Naproxen)  Norgesic Forte (contains aspirin)  Mobic (Meloxicam)  Oruvall (Ketoprofen)  Percodan (contains aspirin)  Relafen (Nabumetone)  Salsalate  Trilisate  Vitamin E (more than 400 mg per day)  Any medication containing aspirin                To speak with a nurse, schedule/reschedule/cancel a clinic appointment, or request a medication refill call: [phone]    You can also reach us by [employer]: [email or web address]

## 2023-05-18 NOTE — LETTER
5/18/2023       RE: Nadira Perez  [id number]  [facility] [address]     Dear Colleague,    Thank you for referring your patient, [facility] Perez[facility]. Please see a copy of my visit note below.    Pain clinic follow up note    HPI:   Nadira Perez is a 72 year old year old female  who presents to the pain clinic with persistent low back pain, s/p SI joint injection which was not helpful    Patient Supplied Answers To the  Pain Questionnaire      5/17/2023     8:40 PM    Pain -  Patient Entered Questionnaire/Answers   How would you describe the pain burning    cramping    sharp    numbness    cutting    dull, aching    throbbing    pressure   Which of the following worsen your pain standing    sitting    walking    exercise   Which of the following improve or reduce your pain lying down    exercise    medication    relaxation    thinking about something else   What number best describes your average pain for the past week:  0 = No pain  to  10 = Worst pain imaginable 6   What number best describes your LOWEST pain in past 24 hours:  0 = No pain  to  10 = Worst pain imaginable 3   What number best describes your WORST pain in past 24 hours:  0 = No pain  to  10 = Worst pain imaginable 6   When is your pain worst AM    PM    Night    Constant   What non-medicine treatments have you already had for your pain pain clinic    physical therapy    relaxation training    acupuncture    spine injections (shots)    other nerve blocks    surgery    exercise             The patient reports lack of short term or long term pain relief after the SI joint injection. She is here to discuss alternatives.     ROS:  Review of Systems   HENT: Positive for hearing loss. Negative for ear discharge, ear pain, nosebleeds, sinus pain, sore throat and tinnitus.    Eyes: Negative for double vision, pain and redness.    Cardiovascular: Positive for palpitations. Negative for chest pain and orthopnea.   Gastrointestinal: Positive for heartburn. Negative for abdominal pain, blood in stool, constipation, diarrhea, melena, nausea and vomiting.   Genitourinary: Positive for urgency. Negative for dysuria, flank pain and hematuria.   Musculoskeletal: Positive for back pain, myalgias and neck pain.   Neurological: Positive for dizziness, tingling, tremors and weakness. Negative for speech change, seizures, loss of consciousness and headaches.   Endo/Heme/Allergies: Does not bruise/bleed easily.   Psychiatric/Behavioral: Negative for depression and memory loss. The patient has insomnia. The patient is not nervous/anxious.    All other systems reviewed and are negative.    Significant Medical History:   Past Medical History:   Diagnosis Date    Arthritis     Bone disease     Breast cancer (H)     H/O kyphoplasty     Hearing problem     History of kidney stones     History of radiation therapy     Hyperlipemia     Hypertension     Hypopotassemia     Kidney problem     Lymph edema     Medullary sponge kidney     Osteopenia     PONV (postoperative nausea and vomiting)     Reduced vision     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    Thyroid disease           Past Surgical History:  Past Surgical History:   Procedure Laterality Date    ABDOMEN SURGERY      ovarian cyst, mesh    ARTHRODESIS WRIST Right     ARTHRODESIS WRIST  02/14/2013    Procedure: ARTHRODESIS WRIST;  left wrist scaphoid excision, four bone fusion, iliac crest bone graft  ( Mac with block);  Surgeon: Av Mendez MD;  [facility]    BIOPSY      skin, vaginal    BLEPHAROPLASTY BILATERAL Bilateral 5/6/2022    Procedure: UPPER BLEPHAROPLASTY, BILATERAL;  Surgeon: Jemal Sanchez MD;  [place] [facility] [place]    CATARACT IOL, RT/LT Right 03/13/2018    CATARACT IOL, RT/LT Left 02/20/2018    COLONOSCOPY  12/24/2013    Procedure: COMBINED COLONOSCOPY, SINGLE  BIOPSY/POLYPECTOMY BY BIOPSY;  COLONOSCOPY;  Surgeon: Dom Alvarez MD;  Location:  GI    COSMETIC BLEPHAROPLASTY LOWER LIDS BILATERAL Bilateral 5/6/2022    Procedure: BLEPHAROPLASTY, LOWER EYELID, BILATERAL, COSMETIC;  Surgeon: Jemal Sanchez MD;  Location: [facility] [place]    ESOPHAGOSCOPY, GASTROSCOPY, DUODENOSCOPY (EGD), COMBINED N/A 11/23/2016    Procedure: COMBINED ESOPHAGOSCOPY, GASTROSCOPY, DUODENOSCOPY (EGD);  Surgeon: Quinten Feliciano MD;  Location:  GI    EXTERNAL EAR SURGERY      right    EYE SURGERY      radial keratomy    FUSION, SPINE, INTERBODY, OBLIQUE ANTERIOR AND LUMBAR, 2 LEVELS, POST APPROACH, USING OTS N/A 11/18/2021    Procedure: Part 1: Oblique Anterior Interbody Fusion at Lumbar 5 to sacral 1 with use of Bone Morphogenic Protein,;  Surgeon: Serge Ramirez MD;  [facility]    GRAFT BONE FROM ILIAC CREST  02/14/2013    Procedure: GRAFT BONE FROM ILIAC CREST;  mac with block and local infilitration;  Surgeon: Av Mendez MD;  [facility]    HC BREATH HYDROGEN TEST N/A 10/14/2016    Procedure: HYDROGEN BREATH TEST;  Surgeon: Cheri Barron MD;  Location:  GI    HERNIA REPAIR      umbilical age 18 mos.    HYSTERECTOMY TOTAL ABDOMINAL  05/03/2000    INJECT JOINT SACROILIAC Left 4/27/2023    Procedure: Left sacroiliac joint steroid injection with fluoroscopy;  Surgeon: Natasha Umaña MD;  Location: [facility] [place]    MASTECTOMY MODIFIED RADICAL Bilateral     bilateral; right breast prophylactic    OPTICAL TRACKING SYSTEM FUSION POSTERIOR SPINE LUMBAR N/A 11/18/2021    Procedure: Open Posterior Instrumented Spinal Fusion at Lumbar 5 to Sacral 1, with grimm aguayo osteotomy, use of O-Arm/Stealth;  Surgeon: Serge Ramirez MD;  [facility]    PARATHYROIDECTOMY  09/23/2004    R SUPERIOR    PARATHYROIDECTOMY  09/23/2004    parathyroid resection, subtotal    RELEASE CARPAL TUNNEL Right 12/2/2021    Procedure: RIGHT CARPAL TUNNEL RELEASE, RIGHT  WRIST HARDWARE REMOVAL, RIGHT CARPAL BOSS EXCISION;  Surgeon: Rolan Conley MD;  [place] [facility] [place]    REMOVE HARDWARE HAND  09/24/2013    Procedure: REMOVE HARDWARE HAND;  Left Hand Screw Removal       RHINOPLASTY  1968    thyr proc skin closed cosmetic manner by subcuticular stitch  01/23/2009    THYROPLASTY  10/09/2009    TONSILLECTOMY  1977    VITRECTOMY PARSPLANA WITH 25 GAUGE SYSTEM Left 6/20/2022    Procedure: LEFT EYE VITRECTOMY, PARS PLANA APPROACH, USING 25-GAUGE INSTRUMENTS, laser;  Surgeon: Felix Carlos MD;  [place] [facility] [place]    WRIST SURGERY      wrist arthrodesis          Family History:  Family History   Problem Relation Age of Onset    Neurologic Disorder Mother         Anuerysm of Cerebral Artery, Dementia    Diabetes Mother     Thyroid Disease Mother         ,    Cerebrovascular Disease Mother     Dementia Mother     Osteoporosis Mother     Heart Disease Father         AAA    Hypertension Father     Circulatory Brother         Perihperal Neurophathy    Diabetes Maternal Grandmother     Asthma Maternal Grandmother     Chronic Obstructive Pulmonary Disease Maternal Grandfather         father    Asthma Maternal Grandfather     Diabetes Maternal Aunt         x2    Melanoma Maternal Aunt     Glaucoma Maternal Aunt     Breast Cancer Cousin     Dementia Other     Cancer Other         malignant melanoma    Hypertension Other     Hypertension Other     Cerebrovascular Disease Other     Cerebrovascular Disease Other     Obesity Other     Respiratory Other     Cancer Other     Diabetes Other     Asthma Other     Macular Degeneration No family hx of     Coronary Artery Disease No family hx of     Hyperlipidemia No family hx of     Kidney Disease No family hx of     Thrombosis No family hx of     Arthritis No family hx of     Depression No family hx of     Mental Illness No family hx of     Substance Abuse No family hx of     Cystic Fibrosis No family hx of     Early Death No family hx of      Coronary Artery Disease Early Onset No family hx of     Heart Failure No family hx of     Bleeding Diathesis No family hx of     Ovarian Cancer No family hx of     Uterine Cancer No family hx of     Prostate Cancer No family hx of     Colorectal Cancer No family hx of     Pancreatic Cancer No family hx of     Lung Cancer No family hx of     Other Cancer No family hx of     Autoimmune Disease No family hx of     Unknown/Adopted No family hx of     Genetic Disorder No family hx of     Bleeding Disorder No family hx of     Clotting Disorder No family hx of     Anesthesia Reaction No family hx of           Social History:  Social History     Socioeconomic History    Marital status:      Spouse name: Not on file    Number of children: Not on file    Years of education: Not on file    Highest education level: Not on file   Occupational History    Not on file   Tobacco Use    Smoking status: Never     Passive exposure: Never    Smokeless tobacco: Never   Vaping Use    Vaping status: Not on file   Substance and Sexual Activity    Alcohol use: Not Currently     Alcohol/week: 7.0 standard drinks of alcohol     Types: 7 Glasses of wine per week     Comment: Rare to occasional    Drug use: Yes     Types: Marijuana    Sexual activity: Not Currently     Partners: Male     Birth control/protection: Abstinence   Other Topics Concern     Service No    Blood Transfusions Not Asked    Caffeine Concern No    Occupational Exposure No    Hobby Hazards No    Sleep Concern No    Stress Concern No    Weight Concern No    Special Diet No    Back Care No    Exercise Yes     Comment: walks 4-6x  week for 20-30 min. each    Bike Helmet Not Asked    Seat Belt Yes    Self-Exams Yes    Parent/sibling w/ CABG, MI or angioplasty before 65F 55M? No   Social History Narrative    .  Recently retired. 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        She  lives with a renter.         She exercises 50 minutes three times a week.     Social Determinants of Health     Financial Resource Strain: Not on file   Food Insecurity: Not on file   Transportation Needs: Not on file   Physical Activity: Not on file   Stress: Not on file   Social Connections: Not on file   Intimate Partner Violence: Not At Risk (4/14/2023)    Humiliation, Afraid, Rape, and Kick questionnaire     Fear of Current or Ex-Partner: No     Emotionally Abused: No     Physically Abused: No     Sexually Abused: No   Housing Stability: Not on file     Social History     Social History Narrative    .  Recently retired. She has retired from [employer] and hopes to focus on a home care program, [employer],  for the elderly in the future.        She lives with a renter.         She exercises 50 minutes three times a week.          Allergies:  Allergies   Allergen Reactions    Erythromycin Nausea    Penicillins Hives     Around age 6 - doesn't recall full reaction, mother told her it was hives.     Has tolerated cephalsporins.        Current Medications:   Current Outpatient Medications   Medication Sig Dispense Refill    acetaminophen (TYLENOL) 500 MG tablet Take 500-1,000 mg by mouth every 6 hours as needed for mild pain      amLODIPine (NORVASC) 10 MG tablet Take 1 tablet (10 mg) by mouth daily 90 tablet 3    Ascorbic Acid (VITAMIN C) 500 MG CHEW Take 1 tablet by mouth daily      atorvastatin (LIPITOR) 80 MG tablet Take 1 tablet (80 mg) by mouth daily 90 tablet 3    Cholecalciferol (VITAMIN D3) 50 MCG (2000 UT) CAPS TAKE 3 CAPSULES (6,000 UNITS) BY MOUTH DAILY. 270 capsule 3    CRANBERRY EXTRACT PO Take 650 mg by mouth daily ~10 am  Takes 1      diclofenac (VOLTAREN) 1 % topical gel APPLY 4 GRAMS TO KNEES OR 2 GRAMS TO HANDS FOUR TIMES DAILY USING ENCLOSED DOSING CARD. 100 g 3    gabapentin (NEURONTIN) 300 MG capsule Take 4 capsules (1,200 mg) by mouth 3 times daily 1080 capsule 3     HYDROcodone-acetaminophen (NORCO) 5-325 MG tablet Take 1 tab for breakthrough pain 1-2 times a week. 30 tablet 0    hydrOXYzine (VISTARIL) 25 MG capsule Take 1 capsule (25 mg) by mouth At Bedtime (Patient not taking: Reported on 4/14/2023) 90 capsule 3    levothyroxine (SYNTHROID/LEVOTHROID) 112 MCG tablet TAKE 1/2 TABLET BY MOUTH DAILY 45 tablet 3    lisinopril (ZESTRIL) 40 MG tablet Take 1 tablet (40 mg) by mouth daily 90 tablet 3    magnesium 250 MG tablet Take 1 tablet by mouth daily      melatonin 5 MG tablet Take 10 mg by mouth At Bedtime       methocarbamol (ROBAXIN) 500 MG tablet Take 1 tablet (500 mg) by mouth 3 times daily (Patient not taking: Reported on 4/14/2023) 40 tablet 4    metoprolol succinate ER (TOPROL XL) 50 MG 24 hr tablet Take 1 tablet (50 mg) by mouth daily 90 tablet 3    Multiple Vitamin (MULTI-VITAMIN) per tablet Take 1 tablet by mouth daily      naloxone (NARCAN) 4 MG/0.1ML nasal spray Spray 1 spray (4 mg) into one nostril alternating nostrils once as needed for opioid reversal every 2-3 minutes until assistance arrives (Patient not taking: Reported on 4/14/2023) 0.2 mL 0    ondansetron (ZOFRAN-ODT) 4 MG ODT tab Take 1 tablet (4 mg) by mouth every 12 hours as needed for nausea 180 tablet 3    senna-docusate (SENOKOT-S/PERICOLACE) 8.6-50 MG tablet Take 2 tablets by mouth 2 times daily (Patient not taking: Reported on 4/14/2023) 30 tablet 0    spironolactone (ALDACTONE) 25 MG tablet Take 2 tablets (50 mg) by mouth daily 180 tablet 3    Vaginal Lubricant (REPHRESH) GEL Place 2 g vaginally every 3 days 2 g 3       Physical Exam:     /72   Pulse 86   Temp 99  F (37.2  C) (Oral)   Resp 18   Wt 87.1 kg (192 lb)   SpO2 98%   BMI 30.07 kg/m      General Appearance: No distress, seated comfortably  Mood: Euthymic  HE ENT: Non constricted pupils  Respiratory: Non labored breathing      ASSESSMENT AND PLAN:     Encounter Diagnosis:  Lumbar stenosis and neurogenic claudication  Lumbosacral  spondylosis  L5/S1 fusion  Spinal stenosis  Peripheral neuropathy       Nadira Perez is a 72 year old year old female  who presents to the pain clinic with low back pain, s/p failed SI joint injection    RECOMMENDATIONS:     1. Medications: No changes    2. Procedure: We are scheduling the patient for L3-4 interlaminar epidural steroid injection with fluoroscopy in the procedure suite. Risks/benefits/alternatives were discussed.     Follow up: 4-6 weeks after injection              Answers for HPI/ROS submitted by the patient on 5/17/2023  DIANNE 7 TOTAL SCORE: 3  General Symptoms: No  Skin Symptoms: No  HENT Symptoms: Yes  EYE SYMPTOMS: Yes  HEART SYMPTOMS: Yes  LUNG SYMPTOMS: No  INTESTINAL SYMPTOMS: Yes  URINARY SYMPTOMS: Yes  GYNECOLOGIC SYMPTOMS: Yes  BREAST SYMPTOMS: No  SKELETAL SYMPTOMS: Yes  BLOOD SYMPTOMS: Yes  NERVOUS SYSTEM SYMPTOMS: Yes  MENTAL HEALTH SYMPTOMS: Yes  Congestion: No  Trouble swallowing: Yes   Voice hoarseness: No  Mouth sores: Yes  Tooth pain: No  Gum tenderness: Yes  Bleeding gums: No  Change in taste: No  Change in sense of smell: No  Dry mouth: Yes  Hearing aid used: Yes  Neck lump: Yes  Vision loss: No  Dry eyes: Yes  Watery eyes: No  Eye bulging: No  Flashing of lights: No  Spots: Yes  Floaters: Yes  Crossed eyes: No  Tunnel Vision: No  Yellowing of eyes: No  Eye irritation: No  Pain in legs with walking: Yes  Fingers or toes appear blue: No  High blood pressure: Yes  Low blood pressure: No  Fainting: No  Murmurs: Yes  Pacemaker: No  Varicose veins: Yes  Wake up at night with shortness of breath: No  Light-headedness: Yes  Exercise intolerance: Yes  Bloating: No  Rectal or Anal pain: No  Fecal incontinence: No  Yellowing of skin or eyes: No  Vomit with blood: No  Change in stools: No  Trouble holding urine or incontinence: Yes  Increased frequency of urination: Yes  Decreased frequency of urination: No  Frequent nighttime urination: Yes  Difficulty emptying bladder: Yes  Bleeding  or spotting between periods: No  Heavy or painful periods: No  Irregular periods: No  Vaginal discharge: No  Hot flashes: No  Vaginal dryness: Yes  Genital ulcers: No  Painful intercourse: Yes  Difficulty with sexual arousal: Yes  Post-menopausal bleeding: No  Swollen joints: Yes  Joint pain: Yes  Bone pain: Yes  Muscle cramps: Yes  Muscle weakness: Yes  Joint stiffness: Yes  Bone fracture: Yes  Edema or swelling: Yes  Anemia: No  Swollen glands: Yes  Trouble with coordination: Yes  Difficulty walking: Yes  Paralysis: No  Numbness: Yes  Trouble thinking or concentrating: No  Mood changes: No  Panic attacks: No          Again, thank you for allowing me to participate in the care of your patient.      Sincerely,    Natasha Umaña MD

## 2023-05-19 ENCOUNTER — TELEPHONE (OUTPATIENT)
Dept: ANESTHESIOLOGY | Facility: CLINIC | Age: 73
End: 2023-05-19
Payer: COMMERCIAL

## 2023-05-19 PROBLEM — M54.16 LUMBAR RADICULOPATHY: Status: ACTIVE | Noted: 2023-05-19

## 2023-05-19 NOTE — TELEPHONE ENCOUNTER
Patient is scheduled with Dr. Umaña   Spoke with: the patient   Date of Procedure: 05/23   Location: [facility]   Informed patient they will need an adult : yes   Additional comments: n/a    Patient is aware pre-op RN will call 2-3 days prior to procedure with arrival time and instructions     Sarthak Milton on 5/19/2023 at 3:37 PM

## 2023-05-23 ENCOUNTER — HOSPITAL ENCOUNTER (OUTPATIENT)
Facility: AMBULATORY SURGERY CENTER | Age: 73
Discharge: HOME | End: 2023-05-23
Attending: ANESTHESIOLOGY | Admitting: ANESTHESIOLOGY
Payer: COMMERCIAL

## 2023-05-23 ENCOUNTER — ANCILLARY PROCEDURE (OUTPATIENT)
Dept: RADIOLOGY | Facility: AMBULATORY SURGERY CENTER | Age: 73
End: 2023-05-23
Attending: ANESTHESIOLOGY
Payer: COMMERCIAL

## 2023-05-23 VITALS
TEMPERATURE: 98.6 F | HEART RATE: 73 BPM | DIASTOLIC BLOOD PRESSURE: 82 MMHG | SYSTOLIC BLOOD PRESSURE: 125 MMHG | RESPIRATION RATE: 18 BRPM | OXYGEN SATURATION: 98 %

## 2023-05-23 DIAGNOSIS — M54.16 LUMBAR RADICULOPATHY: ICD-10-CM

## 2023-05-23 DIAGNOSIS — R52 PAIN: ICD-10-CM

## 2023-05-23 PROCEDURE — 62323 NJX INTERLAMINAR LMBR/SAC: CPT | Performed by: ANESTHESIOLOGY

## 2023-05-23 PROCEDURE — 62323 NJX INTERLAMINAR LMBR/SAC: CPT

## 2023-05-23 RX ORDER — BUPIVACAINE HYDROCHLORIDE 2.5 MG/ML
INJECTION, SOLUTION EPIDURAL; INFILTRATION; INTRACAUDAL PRN
Status: DISCONTINUED | OUTPATIENT
Start: 2023-05-23 | End: 2023-05-23 | Stop reason: HOSPADM

## 2023-05-23 RX ORDER — LIDOCAINE HYDROCHLORIDE 10 MG/ML
INJECTION, SOLUTION EPIDURAL; INFILTRATION; INTRACAUDAL; PERINEURAL PRN
Status: DISCONTINUED | OUTPATIENT
Start: 2023-05-23 | End: 2023-05-23 | Stop reason: HOSPADM

## 2023-05-23 RX ORDER — IOPAMIDOL 408 MG/ML
INJECTION, SOLUTION INTRATHECAL PRN
Status: DISCONTINUED | OUTPATIENT
Start: 2023-05-23 | End: 2023-05-23 | Stop reason: HOSPADM

## 2023-05-23 RX ORDER — METHYLPREDNISOLONE ACETATE 40 MG/ML
INJECTION, SUSPENSION INTRA-ARTICULAR; INTRALESIONAL; INTRAMUSCULAR; SOFT TISSUE PRN
Status: DISCONTINUED | OUTPATIENT
Start: 2023-05-23 | End: 2023-05-23 | Stop reason: HOSPADM

## 2023-05-23 NOTE — DISCHARGE INSTRUCTIONS
Home Care Instructions after an Epidural Steroid Pain Injection    A lumbar epidural steroid injection delivers steroid medication directly into the area that may be causing your lower back pain and/or leg pain. A cervical or thoracic epidural steroid injection delivers steroids into the epidural space surrounding spinal nerve roots to help relieve pain in the upper spine/neck.    Activity  -Rest today  -Do not work today  -Resume normal activity tomorrow  -DO NOT shower for 24 hours  -DO NOT remove bandaid for 24 hours    Pain  -You may experience soreness at the injection site for one or two days  -You may use an ice pack for 20 minutes every 2 hours for the first 24 hours  -You may use a heating pad after the first 24 hours  -You may use Tylenol (acetaminophen) every 4 hours or other pain medicines as     directed by your physician    You may experience numbness radiating into your legs or arms (depending on the procedure location). This numbness may last several hours. Until sensation returns to normal; please use caution in walking, climbing stairs, and stepping out of your vehicle, etc.    Common side effects of steroids:  Not everyone will experience corticosteroid side effects. If side effects are experienced, they will gradually subside in the 7-10 day period following an injection. Most common side effects include:  -Flushed face and/or chest  -Feeling of warmth, particularly in the face but could be an overall feeling of warmth  -Increased blood sugar in diabetic patients  -Menstrual irregularities my occur. If taking hormone-based birth control an alternate method of birth control is recommended  -Sleep disturbances and/or mood swings are possible  -Leg cramps    Please contact us if you have:  -Severe pain  -Fever more than 101.5 degrees Fahrenheit  -Signs of infection at the injection site (redness, swelling, or drainage)    FOR PAIN CENTER PATIENTS:  If you have questions, please contact the Pain  Clinic at [phone] Option #1 between the hours of 7:00 am and 3:00 pm Monday through Friday. After office hours you can contact the on call provider by dialing [phone]. If you need immediate attention, we recommend that you go to a hospital emergency room or dial [phone].

## 2023-05-23 NOTE — H&P
Nadira Perez  [id number]  female  72 year old      Reason for procedure/surgery: lumbar radiculopathy    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     Corneal opacity     Hypothyroidism     Osteopenia, unspecified location     Aromatase inhibitor use     Chronic pain of right knee     DDD (degenerative disc disease), lumbar     Degenerative scoliosis in adult patient     Carpal tunnel syndrome of right wrist     Peripheral neuropathy     Spinal stenosis of lumbar region with neurogenic claudication     Spondylolisthesis of lumbar region     Brain lesion     Dermatochalasis of both upper eyelids     S/P spinal fusion     Chronic bilateral low back pain     PVD (posterior vitreous detachment), left eye     Vitreous opacities of left eye     Closed nondisplaced fracture of lateral malleolus of left fibula, initial encounter     Acute left ankle pain     Sacroiliitis (H)     Lumbar radiculopathy       Past Surgical History:    Past Surgical History:   Procedure Laterality Date     ABDOMEN SURGERY      ovarian cyst, mesh     ARTHRODESIS WRIST Right      ARTHRODESIS WRIST  02/14/2013    Procedure: ARTHRODESIS WRIST;  left wrist scaphoid excision, four bone fusion, iliac crest bone graft  ( Mac with block);  Surgeon: Av Mendez MD;  [facility]     BIOPSY      skin, vaginal     BLEPHAROPLASTY BILATERAL Bilateral 5/6/2022    Procedure: UPPER BLEPHAROPLASTY, BILATERAL;  Surgeon: Jemal Sanchez MD;   Location: [facility] OR     CATARACT IOL, RT/LT Right 03/13/2018     CATARACT IOL, RT/LT Left 02/20/2018     COLONOSCOPY  12/24/2013    Procedure: COMBINED COLONOSCOPY, SINGLE BIOPSY/POLYPECTOMY BY BIOPSY;  COLONOSCOPY;  Surgeon: Dom Alvarez MD;  Location:  GI     COSMETIC BLEPHAROPLASTY LOWER LIDS BILATERAL Bilateral 5/6/2022    Procedure: BLEPHAROPLASTY, LOWER EYELID, BILATERAL, COSMETIC;  Surgeon: Jemal Sanchez MD;  Location: [facility] OR     ESOPHAGOSCOPY, GASTROSCOPY, DUODENOSCOPY (EGD), COMBINED N/A 11/23/2016    Procedure: COMBINED ESOPHAGOSCOPY, GASTROSCOPY, DUODENOSCOPY (EGD);  Surgeon: Quinten Feliciano MD;  Location:  GI     EXTERNAL EAR SURGERY      right     EYE SURGERY      radial keratomy     FUSION, SPINE, INTERBODY, OBLIQUE ANTERIOR AND LUMBAR, 2 LEVELS, POST APPROACH, USING OTS N/A 11/18/2021    Procedure: Part 1: Oblique Anterior Interbody Fusion at Lumbar 5 to sacral 1 with use of Bone Morphogenic Protein,;  Surgeon: Serge Ramirez MD;  Location: [facility]     GRAFT BONE FROM ILIAC CREST  02/14/2013    Procedure: GRAFT BONE FROM ILIAC CREST;  mac with block and local infilitration;  Surgeon: Av Mendez MD;  [facility]     HC BREATH HYDROGEN TEST N/A 10/14/2016    Procedure: HYDROGEN BREATH TEST;  Surgeon: Cheri Barron MD;  Location:  GI     HERNIA REPAIR      umbilical age 18 mos.     HYSTERECTOMY TOTAL ABDOMINAL  05/03/2000     INJECT JOINT SACROILIAC Left 4/27/2023    Procedure: Left sacroiliac joint steroid injection with fluoroscopy;  Surgeon: Natasha Umaña MD;  Location: [facility] OR     MASTECTOMY MODIFIED RADICAL Bilateral     bilateral; right breast prophylactic     OPTICAL TRACKING SYSTEM FUSION POSTERIOR SPINE LUMBAR N/A 11/18/2021    Procedure: Open Posterior Instrumented Spinal Fusion at Lumbar 5 to Sacral 1, with grimm aguayo osteotomy, use of O-Arm/Stealth;  Surgeon: Serge Ramirez MD;  [facility]: [facility]     PARATHYROIDECTOMY   09/23/2004    R SUPERIOR     PARATHYROIDECTOMY  09/23/2004    parathyroid resection, subtotal     RELEASE CARPAL TUNNEL Right 12/2/2021    Procedure: RIGHT CARPAL TUNNEL RELEASE, RIGHT WRIST HARDWARE REMOVAL, RIGHT CARPAL BOSS EXCISION;  Surgeon: Rolan Conley MD;  Location: [facility] OR     REMOVE HARDWARE HAND  09/24/2013    Procedure: REMOVE HARDWARE HAND;  Left Hand Screw Removal        RHINOPLASTY  1968     thyr proc skin closed cosmetic manner by subcuticular stitch  01/23/2009     THYROPLASTY  10/09/2009     TONSILLECTOMY  1977     VITRECTOMY PARSPLANA WITH 25 GAUGE SYSTEM Left 6/20/2022    Procedure: LEFT EYE VITRECTOMY, PARS PLANA APPROACH, USING 25-GAUGE INSTRUMENTS, laser;  Surgeon: Felix Carlos MD;  Location: [facility] OR     WRIST SURGERY      wrist arthrodesis       Past Medical History:   Past Medical History:   Diagnosis Date     Arthritis      Bone disease      Breast cancer (H)      H/O kyphoplasty      Hearing problem      History of kidney stones      History of radiation therapy      Hyperlipemia      Hypertension      Hypopotassemia      Irregular heart beat      Kidney problem      Lymph edema      Medullary sponge kidney      Osteopenia      PONV (postoperative nausea and vomiting)      Reduced vision      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     Thyroid disease        Social History:   Social History     Tobacco Use     Smoking status: Never     Passive exposure: Never     Smokeless tobacco: Never   Vaping Use     Vaping status: Not on file   Substance Use Topics     Alcohol use: Not Currently     Alcohol/week: 7.0 standard drinks of alcohol     Types: 7 Glasses of wine per week     Comment: Rare to occasional       Family History:   Family History   Problem Relation Age of Onset     Neurologic Disorder Mother         Anuerysm of Cerebral Artery, Dementia     Diabetes Mother      Thyroid Disease Mother         ,     Cerebrovascular Disease Mother      Dementia Mother       Osteoporosis Mother      Heart Disease Father         AAA     Hypertension Father      Circulatory Brother         Perihperal Neurophathy     Diabetes Maternal Grandmother      Asthma Maternal Grandmother      Chronic Obstructive Pulmonary Disease Maternal Grandfather         father     Asthma Maternal Grandfather      Diabetes Maternal Aunt         x2     Melanoma Maternal Aunt      Glaucoma Maternal Aunt      Breast Cancer Cousin      Dementia Other      Cancer Other         malignant melanoma     Hypertension Other      Hypertension Other      Cerebrovascular Disease Other      Cerebrovascular Disease Other      Obesity Other      Respiratory Other      Cancer Other      Diabetes Other      Asthma Other      Macular Degeneration No family hx of      Coronary Artery Disease No family hx of      Hyperlipidemia No family hx of      Kidney Disease No family hx of      Thrombosis No family hx of      Arthritis No family hx of      Depression No family hx of      Mental Illness No family hx of      Substance Abuse No family hx of      Cystic Fibrosis No family hx of      Early Death No family hx of      Coronary Artery Disease Early Onset No family hx of      Heart Failure No family hx of      Bleeding Diathesis No family hx of      Ovarian Cancer No family hx of      Uterine Cancer No family hx of      Prostate Cancer No family hx of      Colorectal Cancer No family hx of      Pancreatic Cancer No family hx of      Lung Cancer No family hx of      Other Cancer No family hx of      Autoimmune Disease No family hx of      Unknown/Adopted No family hx of      Genetic Disorder No family hx of      Bleeding Disorder No family hx of      Clotting Disorder No family hx of      Anesthesia Reaction No family hx of        Allergies:   Allergies   Allergen Reactions     Erythromycin Nausea     Penicillins Hives     Around age 6 - doesn't recall full reaction, mother told her it was hives.     Has tolerated cephalsporins.         Active Medications:   Current Outpatient Medications   Medication Sig Dispense Refill     acetaminophen (TYLENOL) 500 MG tablet Take 500-1,000 mg by mouth every 6 hours as needed for mild pain       amLODIPine (NORVASC) 10 MG tablet Take 1 tablet (10 mg) by mouth daily 90 tablet 3     Ascorbic Acid (VITAMIN C) 500 MG CHEW Take 1 tablet by mouth daily       atorvastatin (LIPITOR) 80 MG tablet Take 1 tablet (80 mg) by mouth daily 90 tablet 3     Cholecalciferol (VITAMIN D3) 50 MCG (2000 UT) CAPS TAKE 3 CAPSULES (6,000 UNITS) BY MOUTH DAILY. 270 capsule 3     CRANBERRY EXTRACT PO Take 650 mg by mouth daily ~10 am  Takes 1       diclofenac (VOLTAREN) 1 % topical gel APPLY 4 GRAMS TO KNEES OR 2 GRAMS TO HANDS FOUR TIMES DAILY USING ENCLOSED DOSING CARD. 100 g 3     gabapentin (NEURONTIN) 300 MG capsule Take 4 capsules (1,200 mg) by mouth 3 times daily 1080 capsule 3     HYDROcodone-acetaminophen (NORCO) 5-325 MG tablet Take 1 tab for breakthrough pain 1-2 times a week. 30 tablet 0     levothyroxine (SYNTHROID/LEVOTHROID) 112 MCG tablet TAKE 1/2 TABLET BY MOUTH DAILY 45 tablet 3     lisinopril (ZESTRIL) 40 MG tablet Take 1 tablet (40 mg) by mouth daily 90 tablet 3     magnesium 250 MG tablet Take 1 tablet by mouth daily       melatonin 5 MG tablet Take 10 mg by mouth At Bedtime        methocarbamol (ROBAXIN) 500 MG tablet Take 1 tablet (500 mg) by mouth 3 times daily 40 tablet 4     metoprolol succinate ER (TOPROL XL) 50 MG 24 hr tablet Take 1 tablet (50 mg) by mouth daily 90 tablet 3     Multiple Vitamin (MULTI-VITAMIN) per tablet Take 1 tablet by mouth daily       ondansetron (ZOFRAN-ODT) 4 MG ODT tab Take 1 tablet (4 mg) by mouth every 12 hours as needed for nausea 180 tablet 3     spironolactone (ALDACTONE) 25 MG tablet Take 2 tablets (50 mg) by mouth daily 180 tablet 3     hydrOXYzine (VISTARIL) 25 MG capsule Take 1 capsule (25 mg) by mouth At Bedtime (Patient not taking: Reported on 4/14/2023) 90 capsule 3      naloxone (NARCAN) 4 MG/0.1ML nasal spray Spray 1 spray (4 mg) into one nostril alternating nostrils once as needed for opioid reversal every 2-3 minutes until assistance arrives (Patient not taking: Reported on 4/14/2023) 0.2 mL 0     senna-docusate (SENOKOT-S/PERICOLACE) 8.6-50 MG tablet Take 2 tablets by mouth 2 times daily (Patient not taking: Reported on 4/14/2023) 30 tablet 0     Vaginal Lubricant (REPHRESH) GEL Place 2 g vaginally every 3 days 2 g 3       Systemic Review:   CONSTITUTIONAL: NEGATIVE for fever, chills, change in weight  ENT/MOUTH: NEGATIVE for ear, mouth and throat problems  RESP: NEGATIVE for significant cough or SOB  CV: NEGATIVE for chest pain, palpitations or peripheral edema    Physical Examination:   Vital Signs: /82   Pulse 73   Temp 98.6  F (37  C) (Temporal)   Resp 18   SpO2 98%   GENERAL: healthy, alert and no distress  NECK: no adenopathy, no asymmetry, masses, or scars  RESP: lungs clear to auscultation - no rales, rhonchi or wheezes  CV: regular rate and rhythm, normal S1 S2, no S3 or S4, no murmur, click or rub, no peripheral edema and peripheral pulses strong  ABDOMEN: soft, nontender, no hepatosplenomegaly, no masses and bowel sounds normal  MS: no gross musculoskeletal defects noted, no edema    Plan: Appropriate to proceed as scheduled.      Natasha Umaña MD  5/23/2023

## 2023-05-23 NOTE — OP NOTE
Lumbar Epidural Steroid Injection    Procedure:  1. Lumbar epidural steroid injection at L3-4  2. Fluoroscopy for needle guidance    Pre-operative Diagnosis:  1. Intervertebral disc disorders w radiculopathy, lumbosacral region  2. Other intervertebral disc degeneration, lumbosacral region    Post-Operative Diagnosis:  1. Intervertebral disc disorders w radiculopathy, lumbosacral region  2. Other intervertebral disc degeneration, lumbosacral region    Anesthesia:  Local anesthesia    Medical Indications:  The patient presents to the ambulatory surgery center today for the treatment of persistent pain. We believe that the pain is spinally mediated. The patient has been exhibiting symptoms consistent with lumbar intraspinal inflammation and radiculopathy. Symptoms have been persistent, disabling and intermittently severe. The patient has been evaluated in the pain clinic and scheduled today for a spinal injection to aid in the diagnosis and treatment of presumed spinal pain. The risks, benefits, potential complications, and alternatives were discussed with the patient as per the signed consent form, and informed consent was obtained. The patient has received information about the procedure and its risks. The physician personally confirmed the procedure, side, and site to be injected with the patient and marked the site in the pre-procedure area.    Description of Procedure:  An additional intraoperative timeout, specifically to confirm accurate localization, was conducted by the attending physician. The patient remained awake and alert throughout the procedure. The patient was placed in the prone position. The skin was prepped with chlorhexidine and sterile drapes were applied. The skin and soft tissues were anesthetized with lidocaine 1%. A 22 gauge 3.5 inch touhy needle epidural needle was inserted percutaneously and advanced under fluoroscopic guidance using AP, and lateral projections. Using loss of resistance to  air and a left sided interlaminar approach, the L3-4 posterior epidural space was entered after the lamina was contacted with the epidural needle. No paraesthesias occurred and aspiration was negative. Epidurography and radiographic interpretation: Epidurography was performed by injection of isovue 200 under live fluoroscopy. Multiple Xray images were obtained. Radiologic examination confirmed proper spread of the contrast medium within the epidural space. There was no evidence of intrathecal or intravascular runoff. The needle was injected with 40mg methylprednisolone mixed with 3 ml of 0.25% bupivacaine. The needle was removed after flushing with 1% lidocaine. A sterile bandage was applied. The patient did not experience any hemodynamic or neurologic sequelae. The patient was transferred to the recovery area. Post operative instructions were explained and given to the patient.    Complications:  No procedural or immediate post-procedural complications occurred and the patient was transferred to PACU in stable condition.  Procedure Images:    Post-Operative Plan:  The patient will monitor pain relief from today's procedure. They will call the clinic for any problems related to today's procedure. A copy of our written post-procedure instructions was provided. They will return to clinic as scheduled.

## 2023-05-26 ENCOUNTER — ANCILLARY PROCEDURE (OUTPATIENT)
Dept: BONE DENSITY | Facility: CLINIC | Age: 73
End: 2023-05-26
Attending: FAMILY MEDICINE
Payer: COMMERCIAL

## 2023-05-26 ENCOUNTER — OFFICE VISIT (OUTPATIENT)
Dept: PHARMACY | Facility: CLINIC | Age: 73
End: 2023-05-26
Payer: COMMERCIAL

## 2023-05-26 VITALS — DIASTOLIC BLOOD PRESSURE: 78 MMHG | SYSTOLIC BLOOD PRESSURE: 120 MMHG | HEART RATE: 78 BPM

## 2023-05-26 DIAGNOSIS — M81.8 OTHER OSTEOPOROSIS WITHOUT CURRENT PATHOLOGICAL FRACTURE: ICD-10-CM

## 2023-05-26 DIAGNOSIS — M80.08XG PATHOLOGICAL FRACTURE OF VERTEBRA DUE TO AGE-RELATED OSTEOPOROSIS WITH DELAYED HEALING, SUBSEQUENT ENCOUNTER: ICD-10-CM

## 2023-05-26 DIAGNOSIS — R52 PAIN: ICD-10-CM

## 2023-05-26 DIAGNOSIS — I10 BENIGN ESSENTIAL HYPERTENSION: Primary | ICD-10-CM

## 2023-05-26 PROCEDURE — 99606 MTMS BY PHARM EST 15 MIN: CPT

## 2023-05-26 PROCEDURE — 99607 MTMS BY PHARM ADDL 15 MIN: CPT

## 2023-05-26 PROCEDURE — 77080 DXA BONE DENSITY AXIAL: CPT

## 2023-05-26 RX ORDER — BIOTIN 1 MG
1000 TABLET ORAL DAILY
COMMUNITY
End: 2024-02-15

## 2023-05-26 NOTE — PATIENT INSTRUCTIONS
"Recommendations from today's MTM visit:                                                    MTM (medication therapy management) is a service provided by a clinical pharmacist designed to help you get the most of out of your medicines.   Today we reviewed what your medicines are for, how to know if they are working, that your medicines are safe and how to make your medicine regimen as easy as possible.      Continue medications as prescribed    Follow-up: Return if symptoms worsen or fail to improve.    It was great speaking with you today.  I value your experience and would be very thankful for your time in providing feedback in our clinic survey. In the next few days, you may receive an email or text message from [employer] with a link to a survey related to your  clinical pharmacist.\"     To schedule another MTM appointment, please call the clinic directly or you may call the MTM scheduling line at [phone] or toll-free at [phone].     My Clinical Pharmacist's contact information:                                                      Please feel free to contact me with any questions or concerns you have.      Willis So, Pharm. D., [facility]  Medication Therapy Management Pharmacist  Direct Voicemail: [phone]     "

## 2023-05-26 NOTE — PROGRESS NOTES
Medication Therapy Management (MTM) Encounter    ASSESSMENT:                            Medication Adherence/Access: No issues identified    Hypertension: Patient is at goal of <130/80 mmHg a majority of the time. Always at goal in clinic. Recommend she stay on same regimen.     Pain: Due to issues with high pain levels, it is okay to use ibuprofen as needed.       PLAN:                            1. Continue medications as prescribed    Follow-up: Return if symptoms worsen or fail to improve.      SUBJECTIVE/OBJECTIVE:                          Ismael Perez is a 72 year old female coming in for a follow-up visit.  Today's visit is a follow-up MTM visit from 4/28/23     Reason for visit: hypertension follow-up.    Allergies/ADRs: Reviewed in chart  Past Medical History: Reviewed in chart  Tobacco: She reports that she has never smoked. She has never been exposed to tobacco smoke. She has never used smokeless tobacco.  Alcohol: rarely      Medication Adherence/Access: no issues reported    Hypertension: Current medications include metoprolol XL 50 mg at 10 AM, she takes the amlodipine 10 mg in the morning, she takes lisinopril 40 mg at night, and spironolactone 50 mg daily at 10 am.  Patient does self-monitor blood pressure. Reports that her edema has been more pronounced since December.      Patient brings in blood pressure readings and for the most part ar at goal of <130/80 mmhg. With some ranging into the 140-150 mmHg systolic and some into the 100 mmHg diastolic.     Is doing the bike and some piliates.     BP Readings from Last 3 Encounters:   05/26/23 120/78   05/23/23 125/82   05/18/23 109/72       Pulse Readings from Last 3 Encounters:   05/26/23 78   05/23/23 73   05/18/23 86       Pain: Reports the injections she has gotten recently she is feeling good right now but is hoping to add ibuprofen back in just as needed. Just started using voltaren gel more. Taking acetaminophen 1000 mg three times a day.  Hasnt  even used hydrocodone this week. Not taking methocarbamol as much now, about 4-6 times per week. Reports that THC has been helpful.    Today's Vitals: /78   Pulse 78   ----------------      I spent 30 minutes with this patient today. All changes were made via collaborative practice agreement with MERCEDES Kyle CNP. A copy of the visit note was provided to the patient's provider(s).    A summary of these recommendations was given to the patient.    Willis So, Pharm. D., [facility]  Medication Therapy Management Pharmacist  Direct Voicemail: [phone]       Medication Therapy Recommendations  No medication therapy recommendations to display

## 2023-05-30 DIAGNOSIS — H40.003 GLAUCOMA SUSPECT OF BOTH EYES: ICD-10-CM

## 2023-05-30 DIAGNOSIS — H35.372 EPIRETINAL MEMBRANE (ERM) OF LEFT EYE: Primary | ICD-10-CM

## 2023-06-13 ENCOUNTER — OFFICE VISIT (OUTPATIENT)
Dept: OPHTHALMOLOGY | Facility: CLINIC | Age: 73
End: 2023-06-13
Attending: OPHTHALMOLOGY
Payer: COMMERCIAL

## 2023-06-13 DIAGNOSIS — H40.003 GLAUCOMA SUSPECT OF BOTH EYES: ICD-10-CM

## 2023-06-13 DIAGNOSIS — Z96.1 PSEUDOPHAKIA OF BOTH EYES: Primary | ICD-10-CM

## 2023-06-13 DIAGNOSIS — H35.372 EPIRETINAL MEMBRANE (ERM) OF LEFT EYE: ICD-10-CM

## 2023-06-13 DIAGNOSIS — H43.812 PVD (POSTERIOR VITREOUS DETACHMENT), LEFT EYE: ICD-10-CM

## 2023-06-13 DIAGNOSIS — H52.13 MYOPIA OF BOTH EYES: ICD-10-CM

## 2023-06-13 DIAGNOSIS — H43.392 VITREOUS OPACITIES OF LEFT EYE: ICD-10-CM

## 2023-06-13 PROCEDURE — 92133 CPTRZD OPH DX IMG PST SGM ON: CPT | Performed by: OPHTHALMOLOGY

## 2023-06-13 PROCEDURE — 92134 CPTRZ OPH DX IMG PST SGM RTA: CPT | Performed by: OPHTHALMOLOGY

## 2023-06-13 PROCEDURE — 99214 OFFICE O/P EST MOD 30 MIN: CPT | Performed by: OPHTHALMOLOGY

## 2023-06-13 PROCEDURE — 99207 OCT OPTIC NERVE RNFL SPECTRALIS OU (BOTH EYES): CPT | Mod: 26 | Performed by: OPHTHALMOLOGY

## 2023-06-13 PROCEDURE — G0463 HOSPITAL OUTPT CLINIC VISIT: HCPCS | Performed by: OPHTHALMOLOGY

## 2023-06-13 PROCEDURE — 92015 DETERMINE REFRACTIVE STATE: CPT

## 2023-06-13 ASSESSMENT — REFRACTION_WEARINGRX
OD_ADD: +2.50
OS_AXIS: 045
OD_ADD: +2.50
SPECS_TYPE: COMPUTER
OD_CYLINDER: +0.75
OS_AXIS: 046
OS_CYLINDER: +3.00
OD_AXIS: 178
OD_AXIS: 178
OS_ADD: +2.50
OS_ADD: +2.50
SPECS_TYPE: PAL
OS_SPHERE: -0.75
OS_SPHERE: -1.50
OD_CYLINDER: +0.75
OD_SPHERE: -0.25
OS_CYLINDER: +3.25
OD_SPHERE: +0.50

## 2023-06-13 ASSESSMENT — CONF VISUAL FIELD
OD_NORMAL: 1
OD_INFERIOR_TEMPORAL_RESTRICTION: 0
OD_SUPERIOR_TEMPORAL_RESTRICTION: 0
OD_INFERIOR_NASAL_RESTRICTION: 0
OS_INFERIOR_NASAL_RESTRICTION: 0
METHOD: COUNTING FINGERS
OS_SUPERIOR_TEMPORAL_RESTRICTION: 0
OS_SUPERIOR_NASAL_RESTRICTION: 0
OS_INFERIOR_TEMPORAL_RESTRICTION: 0
OD_SUPERIOR_NASAL_RESTRICTION: 0
OS_NORMAL: 1

## 2023-06-13 ASSESSMENT — VISUAL ACUITY
CORRECTION_TYPE: GLASSES
OD_SC+: +2
OD_PH_SC+: -2
METHOD: SNELLEN - LINEAR
OD_PH_CC: 20/25
OS_PH_CC: 20/25
OD_CC: 20/30
OS_PH_SC+: -2
OS_CC: 20/50
OS_SC+: -2
OS_CC+: -1
OD_PH_SC: 20/25
OD_SC: 20/40
OS_SC: 20/60
OS_PH_SC: 20/25
OS_PH_CC+: -1

## 2023-06-13 ASSESSMENT — REFRACTION_MANIFEST
OD_CYLINDER: +1.75
OS_ADD: +2.50
OD_SPHERE: +1.50
OS_CYLINDER: +2.75
OS_CYLINDER: +2.75
OD_ADD: +2.50
OD_CYLINDER: +1.75
OD_AXIS: 168
OS_AXIS: 045
OD_SPHERE: -0.25
OS_SPHERE: -1.25
OS_AXIS: 045
OS_SPHERE: +0.50
OD_AXIS: 168

## 2023-06-13 ASSESSMENT — SLIT LAMP EXAM - LIDS
COMMENTS: NORMAL
COMMENTS: NORMAL

## 2023-06-13 ASSESSMENT — EXTERNAL EXAM - LEFT EYE: OS_EXAM: NORMAL

## 2023-06-13 ASSESSMENT — TONOMETRY
OS_IOP_MMHG: 19
OD_IOP_MMHG: 17
IOP_METHOD: ICARE

## 2023-06-13 ASSESSMENT — EXTERNAL EXAM - RIGHT EYE: OD_EXAM: NORMAL

## 2023-06-13 NOTE — PROGRESS NOTES
CC: floaters left eye     Interval: doing well  Had multiple fx last year but received Evenity and now her bone mass increased significantly     HPI: Nadira Perez 72 year old with hx of visually significantly floaters        OCT 6/13/23  Right eye PHF attached; normal Foveal contour  Left eye stable ERM; normal foveal contour      ASSESSMENT & PLAN    # S/p PPV25+EL left eye  on 6/20/22 for floaters  - doing great: observe    # Large cup:disc ratio w/ possible temporal thinning OS  -  IOP 17/19  - pachy 598/537  - OCT RNFL within normal limits 6/13/23    # ERM left eye   - extrafoveal; stable; observe    #pseudophakia each eye  Observe; new MRx given    Follow up:  - 12 months for V/T/D and OCT RNFL       Complete documentation of historical and exam elements from today's encounter can be found in the full encounter summary report (not reduplicated in this progress note). I personally obtained the chief complaint(s) and history of present illness.  I confirmed and edited as necessary the review of systems, past medical/surgical history, family history, social history, and examination findings as documented by others; and I examined the patient myself. I personally reviewed the relevant tests, images, and reports as documented above. I formulated and edited as necessary the assessment and plan and discussed the findings and management plan with the patient and family.    Felix Oliveros MD, PhD

## 2023-06-13 NOTE — NURSING NOTE
Chief Complaints and History of Present Illnesses   Patient presents with     Follow Up     8 month follow up S/p PPV25+EL left eye  on 6/20/22     Chief Complaint(s) and History of Present Illness(es)     Follow Up            Comments: 8 month follow up S/p PPV25+EL left eye  on 6/20/22          Comments    Pt states vision is not as clear as it used to be. Pt notes that she sees better in the distance when looking thru the bottom of her glasses.  No eye pain today. No new flashes. New small dark floater in LE, no changes.    Hina Merritt [facility] June 13, 2023 8:53 AM

## 2023-06-15 ENCOUNTER — ANCILLARY PROCEDURE (OUTPATIENT)
Dept: ULTRASOUND IMAGING | Facility: CLINIC | Age: 73
End: 2023-06-15
Attending: STUDENT IN AN ORGANIZED HEALTH CARE EDUCATION/TRAINING PROGRAM
Payer: OTHER MISCELLANEOUS

## 2023-06-15 DIAGNOSIS — M19.039 WRIST ARTHRITIS: ICD-10-CM

## 2023-06-15 PROCEDURE — 76882 US LMTD JT/FCL EVL NVASC XTR: CPT | Performed by: RADIOLOGY

## 2023-06-17 ENCOUNTER — OFFICE VISIT (OUTPATIENT)
Dept: ORTHOPEDICS | Facility: CLINIC | Age: 73
End: 2023-06-17
Payer: COMMERCIAL

## 2023-06-17 ENCOUNTER — ANCILLARY PROCEDURE (OUTPATIENT)
Dept: GENERAL RADIOLOGY | Facility: CLINIC | Age: 73
End: 2023-06-17
Attending: FAMILY MEDICINE
Payer: COMMERCIAL

## 2023-06-17 VITALS — HEIGHT: 65 IN | BODY MASS INDEX: 31.82 KG/M2

## 2023-06-17 DIAGNOSIS — M79.675 PAIN OF TOE OF LEFT FOOT: Primary | ICD-10-CM

## 2023-06-17 DIAGNOSIS — M85.80 OSTEOPENIA, UNSPECIFIED LOCATION: ICD-10-CM

## 2023-06-17 DIAGNOSIS — M79.676 TOE PAIN: ICD-10-CM

## 2023-06-17 PROCEDURE — 99213 OFFICE O/P EST LOW 20 MIN: CPT | Performed by: FAMILY MEDICINE

## 2023-06-17 PROCEDURE — 73630 X-RAY EXAM OF FOOT: CPT | Mod: LT | Performed by: RADIOLOGY

## 2023-06-17 RX ORDER — HEPARIN SODIUM,PORCINE 10 UNIT/ML
5-20 VIAL (ML) INTRAVENOUS DAILY PRN
Status: CANCELLED | OUTPATIENT
Start: 2023-06-20

## 2023-06-17 RX ORDER — HEPARIN SODIUM (PORCINE) LOCK FLUSH IV SOLN 100 UNIT/ML 100 UNIT/ML
5 SOLUTION INTRAVENOUS
Status: CANCELLED | OUTPATIENT
Start: 2023-06-20

## 2023-06-17 RX ORDER — METHYLPREDNISOLONE SODIUM SUCCINATE 125 MG/2ML
125 INJECTION, POWDER, LYOPHILIZED, FOR SOLUTION INTRAMUSCULAR; INTRAVENOUS
Status: CANCELLED
Start: 2023-06-20

## 2023-06-17 RX ORDER — ALBUTEROL SULFATE 0.83 MG/ML
2.5 SOLUTION RESPIRATORY (INHALATION)
Status: CANCELLED | OUTPATIENT
Start: 2023-06-20

## 2023-06-17 RX ORDER — DIPHENHYDRAMINE HYDROCHLORIDE 50 MG/ML
50 INJECTION INTRAMUSCULAR; INTRAVENOUS
Status: CANCELLED
Start: 2023-06-20

## 2023-06-17 RX ORDER — ACETAMINOPHEN 325 MG/1
325 TABLET ORAL EVERY 8 HOURS PRN
Status: CANCELLED | OUTPATIENT
Start: 2023-06-20

## 2023-06-17 RX ORDER — MEPERIDINE HYDROCHLORIDE 25 MG/ML
25 INJECTION INTRAMUSCULAR; INTRAVENOUS; SUBCUTANEOUS EVERY 30 MIN PRN
Status: CANCELLED | OUTPATIENT
Start: 2023-06-20

## 2023-06-17 RX ORDER — EPINEPHRINE 1 MG/ML
0.3 INJECTION, SOLUTION, CONCENTRATE INTRAVENOUS EVERY 5 MIN PRN
Status: CANCELLED | OUTPATIENT
Start: 2023-06-20

## 2023-06-17 RX ORDER — ZOLEDRONIC ACID 5 MG/100ML
5 INJECTION, SOLUTION INTRAVENOUS ONCE
Status: CANCELLED
Start: 2023-06-20

## 2023-06-17 RX ORDER — ALBUTEROL SULFATE 90 UG/1
1-2 AEROSOL, METERED RESPIRATORY (INHALATION)
Status: CANCELLED
Start: 2023-06-20

## 2023-06-17 NOTE — PROGRESS NOTES
SUBJECTIVE:    Nadira Perez is a 72 year old female here today to disuss osteoporosis.  Previous DEXA done 5/26/23.  T-score showed her to be Osteopenia.  Risk factors for osteroporosis include postmenopausal,  or , hyperparathyroid and radiation with breast cancer.  Zometa, Fosamax and now complete Evenity  Hx of breast cancer, Hyperparathyroidism    Today, the patient reports that she stubbed her pinky toe of her left foot on 5/31/23. She dorian splinted the toe. She does still have pain with this 3/10 currently.  DXA- 2021 changed at L1-L3 30%, 33% radius by -12.5%  Left small toe, bumping then tender, wears tennis shoes, 5/31 DOI, took pictures on 6/1  1:30 a.m. hit a box, sorting and hit a box straight on  Size 12 shoe    Osteoporosis Risk Score/FRAX score    [email or web address]  Risk of Hip Fracture: 3.3% (Significant if >3%)  Risk of Overall Fracture: 22.8% (Significant if >20%)    Past Medical History:   Diagnosis Date     Arthritis      Bone disease      Breast cancer (H)      H/O kyphoplasty      Hearing problem      History of kidney stones      History of radiation therapy      Hyperlipemia      Hypertension      Hypopotassemia      Irregular heart beat      Kidney problem      Lymph edema      Medullary sponge kidney      Osteopenia      PONV (postoperative nausea and vomiting)      Reduced vision      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     Thyroid disease        Past Surgical History:   Procedure Laterality Date     ABDOMEN SURGERY      ovarian cyst, mesh     ARTHRODESIS WRIST Right      ARTHRODESIS WRIST  02/14/2013    Procedure: ARTHRODESIS WRIST;  left wrist scaphoid excision, four bone fusion, iliac crest bone graft  ( Mac with block);  Surgeon: vA Mendez MD;  [facility]     BIOPSY      skin, vaginal     BLEPHAROPLASTY BILATERAL Bilateral 5/6/2022    Procedure: UPPER BLEPHAROPLASTY, BILATERAL;  Surgeon: Jemal Sanchez MD;   Location: [facility] OR     CATARACT IOL, RT/LT Right 03/13/2018     CATARACT IOL, RT/LT Left 02/20/2018     COLONOSCOPY  12/24/2013    Procedure: COMBINED COLONOSCOPY, SINGLE BIOPSY/POLYPECTOMY BY BIOPSY;  COLONOSCOPY;  Surgeon: Dom Alvarez MD;  Location:  GI     COSMETIC BLEPHAROPLASTY LOWER LIDS BILATERAL Bilateral 5/6/2022    Procedure: BLEPHAROPLASTY, LOWER EYELID, BILATERAL, COSMETIC;  Surgeon: Jemal Sanchez MD;  [place] [facility] [place] [place]     ESOPHAGOSCOPY, GASTROSCOPY, DUODENOSCOPY (EGD), COMBINED N/A 11/23/2016    Procedure: COMBINED ESOPHAGOSCOPY, GASTROSCOPY, DUODENOSCOPY (EGD);  Surgeon: Quinten Feliciano MD;  Location:  GI     EXTERNAL EAR SURGERY      right     EYE SURGERY      radial keratomy     FUSION, SPINE, INTERBODY, OBLIQUE ANTERIOR AND LUMBAR, 2 LEVELS, POST APPROACH, USING OTS N/A 11/18/2021    Procedure: Part 1: Oblique Anterior Interbody Fusion at Lumbar 5 to sacral 1 with use of Bone Morphogenic Protein,;  Surgeon: Serge Ramirez MD;  [facility]: [facility]     GRAFT BONE FROM ILIAC CREST  02/14/2013    Procedure: GRAFT BONE FROM ILIAC CREST;  mac with block and local infilitration;  Surgeon: Av Mendez MD;  [facility]     HC BREATH HYDROGEN TEST N/A 10/14/2016    Procedure: HYDROGEN BREATH TEST;  Surgeon: Cheri Barron MD;  Location:  GI     HERNIA REPAIR      umbilical age 18 mos.     HYSTERECTOMY TOTAL ABDOMINAL  05/03/2000     INJECT EPIDURAL LUMBAR N/A 5/23/2023    Procedure: L3-4 interlaminar epidural steroid injection with fluoroscopy ( left> right);  Surgeon: Natasha Umaña MD;  [place] [facility] [place]     INJECT JOINT SACROILIAC Left 4/27/2023    Procedure: Left sacroiliac joint steroid injection with fluoroscopy;  Surgeon: Natasha Umaña MD;  Location: [facility] OR     MASTECTOMY MODIFIED RADICAL Bilateral     bilateral; right breast prophylactic     OPTICAL TRACKING SYSTEM FUSION POSTERIOR SPINE LUMBAR N/A 11/18/2021    Procedure: Open Posterior  Instrumented Spinal Fusion at Lumbar 5 to Sacral 1, with grimm aguayo osteotomy, use of O-Arm/Stealth;  Surgeon: Serge Ramirez MD;  [facility]     PARATHYROIDECTOMY  09/23/2004    R SUPERIOR     PARATHYROIDECTOMY  09/23/2004    parathyroid resection, subtotal     RELEASE CARPAL TUNNEL Right 12/2/2021    Procedure: RIGHT CARPAL TUNNEL RELEASE, RIGHT WRIST HARDWARE REMOVAL, RIGHT CARPAL BOSS EXCISION;  Surgeon: Rolan Conley MD;  Location: [facility] OR     REMOVE HARDWARE HAND  09/24/2013    Procedure: REMOVE HARDWARE HAND;  Left Hand Screw Removal        RHINOPLASTY  1968     thyr proc skin closed cosmetic manner by subcuticular stitch  01/23/2009     THYROPLASTY  10/09/2009     TONSILLECTOMY  1977     VITRECTOMY PARSPLANA WITH 25 GAUGE SYSTEM Left 6/20/2022    Procedure: LEFT EYE VITRECTOMY, PARS PLANA APPROACH, USING 25-GAUGE INSTRUMENTS, laser;  Surgeon: Felix Carlos MD;  Location: [facility] OR     WRIST SURGERY      wrist arthrodesis       Current Outpatient Medications   Medication Sig Dispense Refill     acetaminophen (TYLENOL) 500 MG tablet Take 500-1,000 mg by mouth every 6 hours as needed for mild pain       amLODIPine (NORVASC) 10 MG tablet Take 1 tablet (10 mg) by mouth daily 90 tablet 3     Ascorbic Acid (VITAMIN C) 500 MG CHEW Take 1 tablet by mouth daily       atorvastatin (LIPITOR) 80 MG tablet Take 1 tablet (80 mg) by mouth daily 90 tablet 3     biotin 1000 MCG TABS tablet Take 1,000 mcg by mouth daily       Cholecalciferol (VITAMIN D3) 50 MCG (2000 UT) CAPS TAKE 3 CAPSULES (6,000 UNITS) BY MOUTH DAILY. 270 capsule 3     CRANBERRY EXTRACT PO Take 650 mg by mouth daily ~10 am  Takes 1       diclofenac (VOLTAREN) 1 % topical gel APPLY 4 GRAMS TO KNEES OR 2 GRAMS TO HANDS FOUR TIMES DAILY USING ENCLOSED DOSING CARD. 100 g 3     gabapentin (NEURONTIN) 300 MG capsule Take 4 capsules (1,200 mg) by mouth 3 times daily 1080 capsule 3     HEMP OIL OR EXTRACT OR OTHER CBD CANNABINOID, NOT  MEDICAL CANNABIS, THC       HYDROcodone-acetaminophen (NORCO) 5-325 MG tablet Take 1 tab for breakthrough pain 1-2 times a week. 30 tablet 0     hydrOXYzine (VISTARIL) 25 MG capsule Take 1 capsule (25 mg) by mouth At Bedtime (Patient taking differently: Take 25 mg by mouth nightly as needed for other) 90 capsule 3     levothyroxine (SYNTHROID/LEVOTHROID) 112 MCG tablet TAKE 1/2 TABLET BY MOUTH DAILY 45 tablet 3     lisinopril (ZESTRIL) 40 MG tablet Take 1 tablet (40 mg) by mouth daily 90 tablet 3     magnesium 250 MG tablet Take 1 tablet by mouth daily       melatonin 5 MG tablet Take 10 mg by mouth At Bedtime        methocarbamol (ROBAXIN) 500 MG tablet Take 1 tablet (500 mg) by mouth 3 times daily 40 tablet 4     metoprolol succinate ER (TOPROL XL) 50 MG 24 hr tablet Take 1 tablet (50 mg) by mouth daily 90 tablet 3     Multiple Vitamin (MULTI-VITAMIN) per tablet Take 1 tablet by mouth daily       naloxone (NARCAN) 4 MG/0.1ML nasal spray Spray 1 spray (4 mg) into one nostril alternating nostrils once as needed for opioid reversal every 2-3 minutes until assistance arrives 0.2 mL 0     ondansetron (ZOFRAN-ODT) 4 MG ODT tab Take 1 tablet (4 mg) by mouth every 12 hours as needed for nausea 180 tablet 3     spironolactone (ALDACTONE) 25 MG tablet Take 2 tablets (50 mg) by mouth daily 180 tablet 3     Vaginal Lubricant (REPHRESH) GEL Place 2 g vaginally every 3 days 2 g 3       Family History   Problem Relation Age of Onset     Neurologic Disorder Mother         Anuerysm of Cerebral Artery, Dementia     Diabetes Mother      Thyroid Disease Mother         ,     Cerebrovascular Disease Mother      Dementia Mother      Osteoporosis Mother      Heart Disease Father         AAA     Hypertension Father      Circulatory Brother         Perihperal Neurophathy     Diabetes Maternal Grandmother      Asthma Maternal Grandmother      Chronic Obstructive Pulmonary Disease Maternal Grandfather         father     Asthma Maternal  Grandfather      Diabetes Maternal Aunt         x2     Melanoma Maternal Aunt      Glaucoma Maternal Aunt      Breast Cancer Cousin      Dementia Other      Cancer Other         malignant melanoma     Hypertension Other      Hypertension Other      Cerebrovascular Disease Other      Cerebrovascular Disease Other      Obesity Other      Respiratory Other      Cancer Other      Diabetes Other      Asthma Other      Macular Degeneration No family hx of      Coronary Artery Disease No family hx of      Hyperlipidemia No family hx of      Kidney Disease No family hx of      Thrombosis No family hx of      Arthritis No family hx of      Depression No family hx of      Mental Illness No family hx of      Substance Abuse No family hx of      Cystic Fibrosis No family hx of      Early Death No family hx of      Coronary Artery Disease Early Onset No family hx of      Heart Failure No family hx of      Bleeding Diathesis No family hx of      Ovarian Cancer No family hx of      Uterine Cancer No family hx of      Prostate Cancer No family hx of      Colorectal Cancer No family hx of      Pancreatic Cancer No family hx of      Lung Cancer No family hx of      Other Cancer No family hx of      Autoimmune Disease No family hx of      Unknown/Adopted No family hx of      Genetic Disorder No family hx of      Bleeding Disorder No family hx of      Clotting Disorder No family hx of      Anesthesia Reaction No family hx of        Social History     Socioeconomic History     Marital status:      Spouse name: Not on file     Number of children: Not on file     Years of education: Not on file     Highest education level: Not on file   Occupational History     Not on file   Tobacco Use     Smoking status: Never     Passive exposure: Never     Smokeless tobacco: Never   Vaping Use     Vaping status: Not on file   Substance and Sexual Activity     Alcohol use: Not Currently     Alcohol/week: 7.0 standard drinks of alcohol      "Types: 7 Glasses of wine per week     Comment: Rare to occasional     Drug use: Yes     Types: Marijuana     Sexual activity: Not Currently     Partners: Male     Birth control/protection: Abstinence   Other Topics Concern      Service No     Blood Transfusions Not Asked     Caffeine Concern No     Occupational Exposure No     Hobby Hazards No     Sleep Concern No     Stress Concern No     Weight Concern No     Special Diet No     Back Care No     Exercise Yes     Comment: walks 4-6x  week for 20-30 min. each     Bike Helmet Not Asked     Seat Belt Yes     Self-Exams Yes     Parent/sibling w/ CABG, MI or angioplasty before 65F 55M? No   Social History Narrative    .  Recently retired. 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        She lives with a renter.         She exercises 50 minutes three times a week.     Social Determinants of Health     Financial Resource Strain: Not on file   Food Insecurity: Not on file   Transportation Needs: Not on file   Physical Activity: Not on file   Stress: Not on file   Social Connections: Not on file   Intimate Partner Violence: Not At Risk (4/14/2023)    Humiliation, Afraid, Rape, and Kick questionnaire      Fear of Current or Ex-Partner: No      Emotionally Abused: No      Physically Abused: No      Sexually Abused: No   Housing Stability: Not on file       OBJECTIVE:  There were no vitals taken for this visit.  There has been a change in patients height. 5'9\"---> 5'7\"-->5'5\"    ASSESSMENT:  Osteoporosis- given fractures, no Forteo due to past radiation, 1 year Evenity completed  Left fifth toe injury    PLAN:  Optimizing vit D, calcium held due to high, only MVI  pilates reformer- weights and pulleys  No obvious fracture of fifth toe  Reclast orders  Pt to call with any concerns for new fracture  DEXA in 1 year  The importance of calcium and vitamin D in bone health was discussed in " detail. A calcium intake of 1500 mg total per day in divided doses, to include diet and supplements, was recommended.  I have urged the patient to obtain as much as the recommended amount of calcium as possible from the diet.  I recommended use of the [employer] Calcium Calculator to get an estimate of daily total intake in the diet (www.iofcalciumcalculator).800-1000 international units vitamin D daily was also recommended.       We also discussed the role of weight bearing exercise for the treatment of bone loss. I have also recommended weight training at a minimum of 2x/week.       Cortney Clark MD, CAQ   Sports Medicine

## 2023-06-17 NOTE — LETTER
6/17/2023      RE: Nadira Perez  [address]  [facility] [address]     Dear Colleague,    Thank you for referring your patient, Nadira Perez, to the [facility] [place]. Please see a copy of my visit note below.    SUBJECTIVE:    Nadira Perez is a 72 year old female here today to disuss osteoporosis.  Previous DEXA done 5/26/23.  T-score showed her to be Osteopenia.  Risk factors for osteroporosis include postmenopausal,  or , hyperparathyroid and radiation with breast cancer.  Zometa, Fosamax and now complete Evenity  Hx of breast cancer, Hyperparathyroidism    Today, the patient reports that she stubbed her pinky toe of her left foot on 5/31/23. She dorian splinted the toe. She does still have pain with this 3/10 currently.  DXA- 2021 changed at L1-L3 30%, 33% radius by -12.5%  Left small toe, bumping then tender, wears tennis shoes, 5/31 DOI, took pictures on 6/1  1:30 a.m. hit a box, sorting and hit a box straight on  Size 12 shoe    Osteoporosis Risk Score/FRAX score    http://[email or web address].ac.uk/FRAX/  Risk of Hip Fracture: 3.3% (Significant if >3%)  Risk of Overall Fracture: 22.8% (Significant if >20%)    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    Thyroid disease        Past Surgical History:   Procedure Laterality Date    ABDOMEN SURGERY      ovarian cyst, mesh    ARTHRODESIS WRIST Right     ARTHRODESIS WRIST  02/14/2013    Procedure: ARTHRODESIS WRIST;  left wrist scaphoid excision, four bone fusion, iliac crest bone  graft  ( Mac with block);  Surgeon: Av Mendez MD;  [facility]    BIOPSY      skin, vaginal    BLEPHAROPLASTY BILATERAL Bilateral 5/6/2022    Procedure: UPPER BLEPHAROPLASTY, BILATERAL;  Surgeon: Jemal Sanchez MD;  Location: [facility] OR    CATARACT IOL, RT/LT Right 03/13/2018    CATARACT IOL, RT/LT Left 02/20/2018    COLONOSCOPY  12/24/2013    Procedure: COMBINED COLONOSCOPY, SINGLE BIOPSY/POLYPECTOMY BY BIOPSY;  COLONOSCOPY;  Surgeon: Dom Alvarez MD;  Location:  GI    COSMETIC BLEPHAROPLASTY LOWER LIDS BILATERAL Bilateral 5/6/2022    Procedure: BLEPHAROPLASTY, LOWER EYELID, BILATERAL, COSMETIC;  Surgeon: Jemal Sanchez MD;  Location: [facility] OR    ESOPHAGOSCOPY, GASTROSCOPY, DUODENOSCOPY (EGD), COMBINED N/A 11/23/2016    Procedure: COMBINED ESOPHAGOSCOPY, GASTROSCOPY, DUODENOSCOPY (EGD);  Surgeon: Quinetn Feliciano MD;  Location:  GI    EXTERNAL EAR SURGERY      right    EYE SURGERY      radial keratomy    FUSION, SPINE, INTERBODY, OBLIQUE ANTERIOR AND LUMBAR, 2 LEVELS, POST APPROACH, USING OTS N/A 11/18/2021    Procedure: Part 1: Oblique Anterior Interbody Fusion at Lumbar 5 to sacral 1 with use of Bone Morphogenic Protein,;  Surgeon: Serge Ramirez MD;  [facility]    GRAFT BONE FROM ILIAC CREST  02/14/2013    Procedure: GRAFT BONE FROM ILIAC CREST;  mac with block and local infilitration;  Surgeon: Av Mendez MD;  [facility]    HC BREATH HYDROGEN TEST N/A 10/14/2016    Procedure: HYDROGEN BREATH TEST;  Surgeon: Cheri Barron MD;  Location:  GI    HERNIA REPAIR      umbilical age 18 mos.    HYSTERECTOMY TOTAL ABDOMINAL  05/03/2000    INJECT EPIDURAL LUMBAR N/A 5/23/2023    Procedure: L3-4 interlaminar epidural steroid injection with fluoroscopy ( left> right);  Surgeon: Natasha Umaña MD;  Location: [facility] OR    INJECT JOINT SACROILIAC Left 4/27/2023    Procedure: Left sacroiliac joint steroid injection with fluoroscopy;  Surgeon: Chasidy  MD Natasha;  Location: [facility] OR    MASTECTOMY MODIFIED RADICAL Bilateral     bilateral; right breast prophylactic    OPTICAL TRACKING SYSTEM FUSION POSTERIOR SPINE LUMBAR N/A 11/18/2021    Procedure: Open Posterior Instrumented Spinal Fusion at Lumbar 5 to Sacral 1, with grimm aguayo osteotomy, use of O-Arm/Stealth;  Surgeon: Serge Ramirez MD;  [facility]    PARATHYROIDECTOMY  09/23/2004    R SUPERIOR    PARATHYROIDECTOMY  09/23/2004    parathyroid resection, subtotal    RELEASE CARPAL TUNNEL Right 12/2/2021    Procedure: RIGHT CARPAL TUNNEL RELEASE, RIGHT WRIST HARDWARE REMOVAL, RIGHT CARPAL BOSS EXCISION;  Surgeon: Rolan Conley MD;  Location: [facility] OR    REMOVE HARDWARE HAND  09/24/2013    Procedure: REMOVE HARDWARE HAND;  Left Hand Screw Removal       RHINOPLASTY  1968    thyr proc skin closed cosmetic manner by subcuticular stitch  01/23/2009    THYROPLASTY  10/09/2009    TONSILLECTOMY  1977    VITRECTOMY PARSPLANA WITH 25 GAUGE SYSTEM Left 6/20/2022    Procedure: LEFT EYE VITRECTOMY, PARS PLANA APPROACH, USING 25-GAUGE INSTRUMENTS, laser;  Surgeon: Felix Carlos MD;  Location: [facility] OR    WRIST SURGERY      wrist arthrodesis       Current Outpatient Medications   Medication Sig Dispense Refill    acetaminophen (TYLENOL) 500 MG tablet Take 500-1,000 mg by mouth every 6 hours as needed for mild pain      amLODIPine (NORVASC) 10 MG tablet Take 1 tablet (10 mg) by mouth daily 90 tablet 3    Ascorbic Acid (VITAMIN C) 500 MG CHEW Take 1 tablet by mouth daily      atorvastatin (LIPITOR) 80 MG tablet Take 1 tablet (80 mg) by mouth daily 90 tablet 3    biotin 1000 MCG TABS tablet Take 1,000 mcg by mouth daily      Cholecalciferol (VITAMIN D3) 50 MCG (2000 UT) CAPS TAKE 3 CAPSULES (6,000 UNITS) BY MOUTH DAILY. 270 capsule 3    CRANBERRY EXTRACT PO Take 650 mg by mouth daily ~10 am  Takes 1      diclofenac (VOLTAREN) 1 % topical gel APPLY 4 GRAMS TO KNEES OR 2 GRAMS TO HANDS FOUR TIMES DAILY  USING ENCLOSED DOSING CARD. 100 g 3    gabapentin (NEURONTIN) 300 MG capsule Take 4 capsules (1,200 mg) by mouth 3 times daily 1080 capsule 3    HEMP OIL OR EXTRACT OR OTHER CBD CANNABINOID, NOT MEDICAL CANNABIS, THC      HYDROcodone-acetaminophen (NORCO) 5-325 MG tablet Take 1 tab for breakthrough pain 1-2 times a week. 30 tablet 0    hydrOXYzine (VISTARIL) 25 MG capsule Take 1 capsule (25 mg) by mouth At Bedtime (Patient taking differently: Take 25 mg by mouth nightly as needed for other) 90 capsule 3    levothyroxine (SYNTHROID/LEVOTHROID) 112 MCG tablet TAKE 1/2 TABLET BY MOUTH DAILY 45 tablet 3    lisinopril (ZESTRIL) 40 MG tablet Take 1 tablet (40 mg) by mouth daily 90 tablet 3    magnesium 250 MG tablet Take 1 tablet by mouth daily      melatonin 5 MG tablet Take 10 mg by mouth At Bedtime       methocarbamol (ROBAXIN) 500 MG tablet Take 1 tablet (500 mg) by mouth 3 times daily 40 tablet 4    metoprolol succinate ER (TOPROL XL) 50 MG 24 hr tablet Take 1 tablet (50 mg) by mouth daily 90 tablet 3    Multiple Vitamin (MULTI-VITAMIN) per tablet Take 1 tablet by mouth daily      naloxone (NARCAN) 4 MG/0.1ML nasal spray Spray 1 spray (4 mg) into one nostril alternating nostrils once as needed for opioid reversal every 2-3 minutes until assistance arrives 0.2 mL 0    ondansetron (ZOFRAN-ODT) 4 MG ODT tab Take 1 tablet (4 mg) by mouth every 12 hours as needed for nausea 180 tablet 3    spironolactone (ALDACTONE) 25 MG tablet Take 2 tablets (50 mg) by mouth daily 180 tablet 3    Vaginal Lubricant (REPHRESH) GEL Place 2 g vaginally every 3 days 2 g 3       Family History   Problem Relation Age of Onset    Neurologic Disorder Mother         Anuerysm of Cerebral Artery, Dementia    Diabetes Mother     Thyroid Disease Mother         ,    Cerebrovascular Disease Mother     Dementia Mother     Osteoporosis Mother     Heart Disease Father         AAA    Hypertension Father     Circulatory Brother         Perihperal  Neurophathy    Diabetes Maternal Grandmother     Asthma Maternal Grandmother     Chronic Obstructive Pulmonary Disease Maternal Grandfather         father    Asthma Maternal Grandfather     Diabetes Maternal Aunt         x2    Melanoma Maternal Aunt     Glaucoma Maternal Aunt     Breast Cancer Cousin     Dementia Other     Cancer Other         malignant melanoma    Hypertension Other     Hypertension Other     Cerebrovascular Disease Other     Cerebrovascular Disease Other     Obesity Other     Respiratory Other     Cancer Other     Diabetes Other     Asthma Other     Macular Degeneration No family hx of     Coronary Artery Disease No family hx of     Hyperlipidemia No family hx of     Kidney Disease No family hx of     Thrombosis No family hx of     Arthritis No family hx of     Depression No family hx of     Mental Illness No family hx of     Substance Abuse No family hx of     Cystic Fibrosis No family hx of     Early Death No family hx of     Coronary Artery Disease Early Onset No family hx of     Heart Failure No family hx of     Bleeding Diathesis No family hx of     Ovarian Cancer No family hx of     Uterine Cancer No family hx of     Prostate Cancer No family hx of     Colorectal Cancer No family hx of     Pancreatic Cancer No family hx of     Lung Cancer No family hx of     Other Cancer No family hx of     Autoimmune Disease No family hx of     Unknown/Adopted No family hx of     Genetic Disorder No family hx of     Bleeding Disorder No family hx of     Clotting Disorder No family hx of     Anesthesia Reaction No family hx of        Social History     Socioeconomic History    Marital status:      Spouse name: Not on file    Number of children: Not on file    Years of education: Not on file    Highest education level: Not on file   Occupational History    Not on file   Tobacco Use    Smoking status: Never     Passive exposure: Never    Smokeless tobacco: Never   Vaping Use    Vaping status: Not on  "file   Substance and Sexual Activity    Alcohol use: Not Currently     Alcohol/week: 7.0 standard drinks of alcohol     Types: 7 Glasses of wine per week     Comment: Rare to occasional    Drug use: Yes     Types: Marijuana    Sexual activity: Not Currently     Partners: Male     Birth control/protection: Abstinence   Other Topics Concern     Service No    Blood Transfusions Not Asked    Caffeine Concern No    Occupational Exposure No    Hobby Hazards No    Sleep Concern No    Stress Concern No    Weight Concern No    Special Diet No    Back Care No    Exercise Yes     Comment: walks 4-6x  week for 20-30 min. each    Bike Helmet Not Asked    Seat Belt Yes    Self-Exams Yes    Parent/sibling w/ CABG, MI or angioplasty before 65F 55M? No   Social History Narrative    .  Recently retired. 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        She lives with a renter.         She exercises 50 minutes three times a week.     Social Determinants of Health     Financial Resource Strain: Not on file   Food Insecurity: Not on file   Transportation Needs: Not on file   Physical Activity: Not on file   Stress: Not on file   Social Connections: Not on file   Intimate Partner Violence: Not At Risk (4/14/2023)    Humiliation, Afraid, Rape, and Kick questionnaire     Fear of Current or Ex-Partner: No     Emotionally Abused: No     Physically Abused: No     Sexually Abused: No   Housing Stability: Not on file   OBJECTIVE:  There were no vitals taken for this visit.  There has been a change in patients height. 5'9\"---> 5'7\"-->5'5\"    ASSESSMENT:  Osteoporosis- given fractures, no Forteo due to past radiation, 1 year Evenity completed  Left fifth toe injury    PLAN:  Optimizing vit D, calcium held due to high, only MVI  pilates reformer- weights and pulleys  No obvious fracture of fifth toe  Reclast orders  Pt to call with any concerns for new " fracture  DEXA in 1 year  The importance of calcium and vitamin D in bone health was discussed in detail. A calcium intake of 1500 mg total per day in divided doses, to include diet and supplements, was recommended.  I have urged the patient to obtain as much as the recommended amount of calcium as possible from the diet.  I recommended use of the International Osteoporosis Foundation Calcium Calculator to get an estimate of daily total intake in the diet (www.iofcalciumcalculator).800-1000 international units vitamin D daily was also recommended.       We also discussed the role of weight bearing exercise for the treatment of bone loss. I have also recommended weight training at a minimum of 2x/week.       Cortney Clark MD, CAQ   Sports Medicine

## 2023-07-02 ENCOUNTER — MYC REFILL (OUTPATIENT)
Dept: INTERNAL MEDICINE | Facility: CLINIC | Age: 73
End: 2023-07-02
Payer: COMMERCIAL

## 2023-07-02 DIAGNOSIS — M48.062 SPINAL STENOSIS OF LUMBAR REGION WITH NEUROGENIC CLAUDICATION: ICD-10-CM

## 2023-07-02 DIAGNOSIS — M51.369 DDD (DEGENERATIVE DISC DISEASE), LUMBAR: ICD-10-CM

## 2023-07-06 RX ORDER — HYDROCODONE BITARTRATE AND ACETAMINOPHEN 5; 325 MG/1; MG/1
TABLET ORAL
Qty: 30 TABLET | Refills: 0 | Status: SHIPPED | OUTPATIENT
Start: 2023-07-06 | End: 2023-09-19

## 2023-07-07 NOTE — PROGRESS NOTES
Orthopaedic Surgery Hand Clinic Progress Note    Patient Name: Nadira Perez  MRN: [id number]  : 1950  Date: 2023    Date of Surgery: 21    Surgery Performed: 1) Open right carpal tunnel release  2) Removal of buried implant (deep) right wrist    Subjective:     23: Patient was last seen 23. She had ultrasound completed 6/15/23 and is here to review results today. Since last visit she reports continued spasms and cramping of the fingers in her left hand. She continues to have cramping/locking of her fingers with certain postures like putting on earrings or her bra. Seems to most affected the ulnar sided digits. She does not have classic pain or paresthesias in the median nerve distribution. No nocturnal symptoms. She has been wearing a wrist brace, using Tylenol and gabapentin.  She is interested in repeating the left radiocarpal  injection today.    23: Patient was last seen 23 at which time I provided bilateral radiocarpal injections for wrist OA and left middle finger trigger injection. She is still having relief from all three injections. MF triggering seems to have resolved. Main concern today is cramping of the fingers. Ongoing 6 months, worse over the last 2. She states the thumb, index, middle, and ring fingers seem to lock up with certain hand postures like when putting on earrings or wearing her bra. She does not have classic pain or paresthesias in the median nerve distribution. No nocturnal symptoms. She has been wearing a wrist brace, using Tylenol and gabapentin.    Update 2023 Patient last seen on 22. At which time patient had cortisone injection for A1 pulley of left ring finger and bilateral radiocarpal injections. She states these are quite effective for a while after the injections. Patient was to continue wearing  She returns today for repeat radiocarpal injections. She has developed dorsal radial swelling over the carpus more pronounced on  "the right. She has developed locking/catching of the left long finger as well.    8/9/22: Patient was last seen 5/3/22 at which time I provided her with bilateral radiocarpal injections and new braces. She notes since last visit she sustained a fall around 5/4, and sustained nondisplaced fractures to left ring finger and right thumb for which she was treated by Dr. Miguel. She has healed fine from those, states thumb IP still a bit stiff. She has continued to have bilateral wrist pain and notes new onset of \"spasms\" in left hand with gripping/ grasping objections within the last 2-3 weeks. She states that her left small and ring fingers seem to lock and spasm with /finger flexion. She has to forcibly extend it sometimes to make them straight with pain. She states this must have happened 6-7 times over the last 2 months.      Objective:  /63   Ht 1.664 m (5' 5.5\")   Wt 87.5 kg (193 lb)   SpO2 97%   BMI 31.63 kg/m      General: alert, appropriate, NAD  HEENT: NC/AT  CV: RRR by pulse  Pulm: Unlabored on RA  MSK:  BUE   Volar and dorsal incisions c/d/i, no e/o infection  No tenderness of the right thumb  Moderate pain to palpation dorsal central wrists bilaterally  Mild effusion and capsular swelling over bilateral dorsal radial wrist over the carpus  +TTP over left index finger A1 pulley with mild crepitus, full flexion/extension, able to make full fist, no malrotation or scissoring. No visible triggering  Minimal tenderness over A1 pulley off left middle   There is mild crepitus felt over the small finger with flexion/extension - seems to be associated with the extensor tendon but it is variably reproducible  ROM baseline  Intact EPL/FPL/APB/HI  Diminished sensation median nerve distribution baseline  Negative Tinel's at the left carpal tunnel, no crepitus, there is fullness of the volar wrist proximal to carpal tunnel likely consistent with flexor tenosynovitis  +TTP scaphoid tubercle where an " osteophyte is felt.  WWP CR< 2s    Imaging:  None new. XRs of left ring finger and right hand from 5/4/2022 and 5/31/2022 reviewed.  These demonstrate nondisplaced fractures of the right thumb distal phalanx and left ring distal phalanx that have healed.      Repeat XRs of bilateral wrists 2/14/23 demonstrates Unchanged alignment and appearance of 4 corner fusion on the left with ulnar subluxation of the carpus. Significant volar carpal osteophytes. 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     EMG/NCS 10/21/21  severe right median neuropathy at the wrist (carpal tunnel syndrome); no significant findings median or ulnar nerve on the left     Ultrasound 6/15/23  Impression:      1. No sonographic evidence of left carpal tunnel syndrome.     2. Tenosynovitis of the flexor carpi radialis.     3. Normal sonographic appearance of flexor tendon motion within the carpal tunnel.    Assessment/Plan:  71F with bilateral radiocarpal arthritis. Right s/p hardware removal for second failed 4 corner fusion, and carpal tunnel release.    Patient's left hand cramping is not consistent with carpal tunnel syndrome. EMG/NCS 10/21/21 also negative on the left.     Although recent ultrasound was negative, I still think it is most likely that the volar or dorsal carpal osteophytes are be causing impingement on the flexor or extensor tendons, which would account for the catching and cramping of the fingers. I do not see clear evidence of trigger fingers. Symptoms do seem mechanical however.    We could consider an MRI of the left wrist in the future or injections the left carpal tunnel and index finger. She would like to hold off on this today.     She wishes to have a left radiocarpal injection today. Last right wrist injection 2/14/23.    After obtaining written consent, I cleansed the skin over the dorsal wrist with chlorhexidine.  I then anesthetized skin with ethyl chloride free spray  after which I injected 1 cc of 1% lidocaine and 40 mg of Kenalog into the left radiocarpal joint through the 3-4 portal.  The patient tolerated this well without complication.    Continue brace wear. Continue radiocarpal injections every 4-6 months for symptomatic control, until she decides that she wants to proceed with total wrist fusions.    Rolan Conley MD    Hand, Upper Extremity & Microvascular Surgery  Department of Orthopedic Surgery  [facility]      Medium Joint Injection/Arthrocentesis: L radiocarpal    Date/Time: 7/11/2023 10:05 AM    Performed by: Rolan Conley MD  Authorized by: Rolan Conley MD    Indications:  Pain  Needle Size:  25 G  Guidance: surface landmarks    Location:  Wrist  Site:  L radiocarpal  Medications:  1 mL lidocaine 1 %; 40 mg triamcinolone 40 MG/ML  Outcome:  Tolerated well, no immediate complications  Procedure discussed: discussed risks, benefits, and alternatives    Consent Given by:  Patient  Timeout: timeout called immediately prior to procedure    Prep: patient was prepped and draped in usual sterile fashion

## 2023-07-11 ENCOUNTER — OFFICE VISIT (OUTPATIENT)
Dept: ORTHOPEDICS | Facility: CLINIC | Age: 73
End: 2023-07-11
Payer: OTHER MISCELLANEOUS

## 2023-07-11 VITALS
DIASTOLIC BLOOD PRESSURE: 63 MMHG | OXYGEN SATURATION: 97 % | HEIGHT: 66 IN | WEIGHT: 193 LBS | BODY MASS INDEX: 31.02 KG/M2 | SYSTOLIC BLOOD PRESSURE: 117 MMHG

## 2023-07-11 DIAGNOSIS — M19.039 WRIST ARTHRITIS: Primary | ICD-10-CM

## 2023-07-11 PROCEDURE — 20605 DRAIN/INJ JOINT/BURSA W/O US: CPT | Mod: LT | Performed by: STUDENT IN AN ORGANIZED HEALTH CARE EDUCATION/TRAINING PROGRAM

## 2023-07-11 PROCEDURE — 99214 OFFICE O/P EST MOD 30 MIN: CPT | Mod: 25 | Performed by: STUDENT IN AN ORGANIZED HEALTH CARE EDUCATION/TRAINING PROGRAM

## 2023-07-11 RX ORDER — LIDOCAINE HYDROCHLORIDE 10 MG/ML
1 INJECTION, SOLUTION INFILTRATION; PERINEURAL
Status: DISCONTINUED | OUTPATIENT
Start: 2023-07-11 | End: 2023-09-12

## 2023-07-11 RX ORDER — TRIAMCINOLONE ACETONIDE 40 MG/ML
40 INJECTION, SUSPENSION INTRA-ARTICULAR; INTRAMUSCULAR
Status: DISCONTINUED | OUTPATIENT
Start: 2023-07-11 | End: 2024-02-16

## 2023-07-11 RX ADMIN — LIDOCAINE HYDROCHLORIDE 1 ML: 10 INJECTION, SOLUTION INFILTRATION; PERINEURAL at 10:05

## 2023-07-11 RX ADMIN — TRIAMCINOLONE ACETONIDE 40 MG: 40 INJECTION, SUSPENSION INTRA-ARTICULAR; INTRAMUSCULAR at 10:05

## 2023-07-11 NOTE — PATIENT INSTRUCTIONS
Thank you for choosing [facility]    Dr. Conley Locations:    [facility] [address], [address]  [address] [address]   Appointments: [phone] Appointments: [phone]   Fax: [phone] Fax: [phone]     Follow up: as needed

## 2023-07-11 NOTE — LETTER
2023         RE: Nadira Perez  [id number]  [facility] [id number]        Dear Colleague,    Thank you for referring your patient, Nadira Perez, to the [facility] [place]. Please see a copy of my visit note below.    Orthopaedic Surgery Hand Clinic Progress Note    Patient Name: Nadira Perez  MRN: [id number]  : 1950  Date: 2023    Date of Surgery: 21    Surgery Performed: 1) Open right carpal tunnel release  2) Removal of buried implant (deep) right wrist    Subjective:     23: Patient was last seen 23. She had ultrasound completed 6/15/23 and is here to review results today. Since last visit she reports continued spasms and cramping of the fingers in her left hand. She continues to have cramping/locking of her fingers with certain postures like putting on earrings or her bra. Seems to most affected the ulnar sided digits. She does not have classic pain or paresthesias in the median nerve distribution. No nocturnal symptoms. She has been wearing a wrist brace, using Tylenol and gabapentin.  She is interested in repeating the left radiocarpal  injection today.    23: Patient was last seen 23 at which time I provided bilateral radiocarpal injections for wrist OA and left middle finger trigger injection. She is still having relief from all three injections. MF triggering seems to have resolved. Main concern today is cramping of the fingers. Ongoing 6 months, worse over the last 2. She states the thumb, index, middle, and ring fingers seem to lock up with certain hand postures like when putting on earrings or wearing her bra. She does not have classic pain or paresthesias in the median nerve distribution. No nocturnal symptoms. She has been wearing a wrist brace, using Tylenol and gabapentin.    Update 2023 Patient last seen on 22. At which time patient had cortisone injection for A1 pulley of left ring finger  "and bilateral radiocarpal injections. She states these are quite effective for a while after the injections. Patient was to continue wearing  She returns today for repeat radiocarpal injections. She has developed dorsal radial swelling over the carpus more pronounced on the right. She has developed locking/catching of the left long finger as well.    8/9/22: Patient was last seen 5/3/22 at which time I provided her with bilateral radiocarpal injections and new braces. She notes since last visit she sustained a fall around 5/4, and sustained nondisplaced fractures to left ring finger and right thumb for which she was treated by Dr. Miguel. She has healed fine from those, states thumb IP still a bit stiff. She has continued to have bilateral wrist pain and notes new onset of \"spasms\" in left hand with gripping/ grasping objections within the last 2-3 weeks. She states that her left small and ring fingers seem to lock and spasm with /finger flexion. She has to forcibly extend it sometimes to make them straight with pain. She states this must have happened 6-7 times over the last 2 months.      Objective:  /63   Ht 1.664 m (5' 5.5\")   Wt 87.5 kg (193 lb)   SpO2 97%   BMI 31.63 kg/m      General: alert, appropriate, NAD  HEENT: NC/AT  CV: RRR by pulse  Pulm: Unlabored on RA  MSK:  BUE   Volar and dorsal incisions c/d/i, no e/o infection  No tenderness of the right thumb  Moderate pain to palpation dorsal central wrists bilaterally  Mild effusion and capsular swelling over bilateral dorsal radial wrist over the carpus  +TTP over left index finger A1 pulley with mild crepitus, full flexion/extension, able to make full fist, no malrotation or scissoring. No visible triggering  Minimal tenderness over A1 pulley off left middle   There is mild crepitus felt over the small finger with flexion/extension - seems to be associated with the extensor tendon but it is variably reproducible  ROM baseline  Intact " EPL/FPL/APB/HI  Diminished sensation median nerve distribution baseline  Negative Tinel's at the left carpal tunnel, no crepitus, there is fullness of the volar wrist proximal to carpal tunnel likely consistent with flexor tenosynovitis  +TTP scaphoid tubercle where an osteophyte is felt.  WWP CR< 2s    Imaging:  None new. XRs of left ring finger and right hand from 5/4/2022 and 5/31/2022 reviewed.  These demonstrate nondisplaced fractures of the right thumb distal phalanx and left ring distal phalanx that have healed.      Repeat XRs of bilateral wrists 2/14/23 demonstrates Unchanged alignment and appearance of 4 corner fusion on the left with ulnar subluxation of the carpus. Significant volar carpal osteophytes. 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     EMG/NCS 10/21/21  severe right median neuropathy at the wrist (carpal tunnel syndrome); no significant findings median or ulnar nerve on the left     Ultrasound 6/15/23  Impression:      1. No sonographic evidence of left carpal tunnel syndrome.     2. Tenosynovitis of the flexor carpi radialis.     3. Normal sonographic appearance of flexor tendon motion within the carpal tunnel.    Assessment/Plan:  71F with bilateral radiocarpal arthritis. Right s/p hardware removal for second failed 4 corner fusion, and carpal tunnel release.    Patient's left hand cramping is not consistent with carpal tunnel syndrome. EMG/NCS 10/21/21 also negative on the left.     Although recent ultrasound was negative, I still think it is most likely that the volar or dorsal carpal osteophytes are be causing impingement on the flexor or extensor tendons, which would account for the catching and cramping of the fingers. I do not see clear evidence of trigger fingers. Symptoms do seem mechanical however.    We could consider an MRI of the left wrist in the future or injections the left carpal tunnel and index finger. She would like to hold  off on this today.     She wishes to have a left radiocarpal injection today. Last right wrist injection 2/14/23.    After obtaining written consent, I cleansed the skin over the dorsal wrist with chlorhexidine.  I then anesthetized skin with ethyl chloride free spray after which I injected 1 cc of 1% lidocaine and 40 mg of Kenalog into the left radiocarpal joint through the 3-4 portal.  The patient tolerated this well without complication.    Continue brace wear. Continue radiocarpal injections every 4-6 months for symptomatic control, until she decides that she wants to proceed with total wrist fusions.    Rolan Conley MD    Hand, Upper Extremity & Microvascular Surgery  Department of Orthopedic Surgery  [facility]      Medium Joint Injection/Arthrocentesis: L radiocarpal    Date/Time: 7/11/2023 10:05 AM    Performed by: Rolan Conley MD  Authorized by: Rolan Conley MD    Indications:  Pain  Needle Size:  25 G  Guidance: surface landmarks    Location:  Wrist  Site:  L radiocarpal  Medications:  1 mL lidocaine 1 %; 40 mg triamcinolone 40 MG/ML  Outcome:  Tolerated well, no immediate complications  Procedure discussed: discussed risks, benefits, and alternatives    Consent Given by:  Patient  Timeout: timeout called immediately prior to procedure    Prep: patient was prepped and draped in usual sterile fashion                Again, thank you for allowing me to participate in the care of your patient.        Sincerely,        Rolan Conley MD

## 2023-07-19 DIAGNOSIS — G47.20 SLEEP PATTERN DISTURBANCE: ICD-10-CM

## 2023-07-19 RX ORDER — METHYLPREDNISOLONE SODIUM SUCCINATE 125 MG/2ML
125 INJECTION, POWDER, LYOPHILIZED, FOR SOLUTION INTRAMUSCULAR; INTRAVENOUS
Status: CANCELLED
Start: 2023-07-19

## 2023-07-19 RX ORDER — HEPARIN SODIUM (PORCINE) LOCK FLUSH IV SOLN 100 UNIT/ML 100 UNIT/ML
5 SOLUTION INTRAVENOUS
Status: CANCELLED | OUTPATIENT
Start: 2023-07-19

## 2023-07-19 RX ORDER — ACETAMINOPHEN 325 MG/1
325 TABLET ORAL EVERY 8 HOURS PRN
Status: CANCELLED | OUTPATIENT
Start: 2023-07-19

## 2023-07-19 RX ORDER — ALBUTEROL SULFATE 0.83 MG/ML
2.5 SOLUTION RESPIRATORY (INHALATION)
Status: CANCELLED | OUTPATIENT
Start: 2023-07-19

## 2023-07-19 RX ORDER — HEPARIN SODIUM,PORCINE 10 UNIT/ML
5-20 VIAL (ML) INTRAVENOUS DAILY PRN
Status: CANCELLED | OUTPATIENT
Start: 2023-07-19

## 2023-07-19 RX ORDER — ALBUTEROL SULFATE 90 UG/1
1-2 AEROSOL, METERED RESPIRATORY (INHALATION)
Status: CANCELLED
Start: 2023-07-19

## 2023-07-19 RX ORDER — DIPHENHYDRAMINE HYDROCHLORIDE 50 MG/ML
50 INJECTION INTRAMUSCULAR; INTRAVENOUS
Status: CANCELLED
Start: 2023-07-19

## 2023-07-19 RX ORDER — EPINEPHRINE 1 MG/ML
0.3 INJECTION, SOLUTION INTRAMUSCULAR; SUBCUTANEOUS EVERY 5 MIN PRN
Status: CANCELLED | OUTPATIENT
Start: 2023-07-19

## 2023-07-19 RX ORDER — ZOLEDRONIC ACID 5 MG/100ML
5 INJECTION, SOLUTION INTRAVENOUS ONCE
Status: CANCELLED
Start: 2023-07-19

## 2023-07-19 RX ORDER — MEPERIDINE HYDROCHLORIDE 25 MG/ML
25 INJECTION INTRAMUSCULAR; INTRAVENOUS; SUBCUTANEOUS EVERY 30 MIN PRN
Status: CANCELLED | OUTPATIENT
Start: 2023-07-19

## 2023-07-20 ENCOUNTER — INFUSION THERAPY VISIT (OUTPATIENT)
Dept: INFUSION THERAPY | Facility: CLINIC | Age: 73
End: 2023-07-20
Attending: FAMILY MEDICINE
Payer: COMMERCIAL

## 2023-07-20 ENCOUNTER — OFFICE VISIT (OUTPATIENT)
Dept: ORTHOPEDICS | Facility: CLINIC | Age: 73
End: 2023-07-20
Payer: COMMERCIAL

## 2023-07-20 ENCOUNTER — ANCILLARY PROCEDURE (OUTPATIENT)
Dept: GENERAL RADIOLOGY | Facility: CLINIC | Age: 73
End: 2023-07-20
Attending: PREVENTIVE MEDICINE
Payer: COMMERCIAL

## 2023-07-20 ENCOUNTER — APPOINTMENT (OUTPATIENT)
Dept: LAB | Facility: CLINIC | Age: 73
End: 2023-07-20
Attending: FAMILY MEDICINE
Payer: COMMERCIAL

## 2023-07-20 VITALS
HEART RATE: 71 BPM | BODY MASS INDEX: 32.07 KG/M2 | DIASTOLIC BLOOD PRESSURE: 79 MMHG | OXYGEN SATURATION: 97 % | RESPIRATION RATE: 16 BRPM | SYSTOLIC BLOOD PRESSURE: 129 MMHG | TEMPERATURE: 99 F | WEIGHT: 195.7 LBS

## 2023-07-20 DIAGNOSIS — S93.512A SPRAIN OF INTERPHALANGEAL JOINT OF LEFT GREAT TOE, INITIAL ENCOUNTER: Primary | ICD-10-CM

## 2023-07-20 DIAGNOSIS — M85.80 OSTEOPENIA, UNSPECIFIED LOCATION: Primary | ICD-10-CM

## 2023-07-20 DIAGNOSIS — S99.922A: Primary | ICD-10-CM

## 2023-07-20 DIAGNOSIS — S99.922A: ICD-10-CM

## 2023-07-20 DIAGNOSIS — M25.561 RIGHT KNEE PAIN, UNSPECIFIED CHRONICITY: ICD-10-CM

## 2023-07-20 DIAGNOSIS — M25.872 SESAMOIDITIS OF LEFT FOOT: ICD-10-CM

## 2023-07-20 LAB
CALCIUM SERPL-MCNC: 9.5 MG/DL (ref 8.8–10.2)
CREAT SERPL-MCNC: 0.76 MG/DL (ref 0.51–0.95)
GFR SERPL CREATININE-BSD FRML MDRD: 83 ML/MIN/1.73M2

## 2023-07-20 PROCEDURE — 73630 X-RAY EXAM OF FOOT: CPT | Mod: LT | Performed by: RADIOLOGY

## 2023-07-20 PROCEDURE — 250N000011 HC RX IP 250 OP 636: Mod: JZ | Performed by: FAMILY MEDICINE

## 2023-07-20 PROCEDURE — 82565 ASSAY OF CREATININE: CPT | Performed by: FAMILY MEDICINE

## 2023-07-20 PROCEDURE — 36415 COLL VENOUS BLD VENIPUNCTURE: CPT | Performed by: FAMILY MEDICINE

## 2023-07-20 PROCEDURE — 99213 OFFICE O/P EST LOW 20 MIN: CPT | Performed by: PREVENTIVE MEDICINE

## 2023-07-20 PROCEDURE — 82310 ASSAY OF CALCIUM: CPT | Performed by: FAMILY MEDICINE

## 2023-07-20 PROCEDURE — 96365 THER/PROPH/DIAG IV INF INIT: CPT

## 2023-07-20 RX ORDER — ZOLEDRONIC ACID 5 MG/100ML
5 INJECTION, SOLUTION INTRAVENOUS ONCE
Status: COMPLETED | OUTPATIENT
Start: 2023-07-20 | End: 2023-07-20

## 2023-07-20 RX ADMIN — ZOLEDRONIC ACID 5 MG: 0.05 INJECTION, SOLUTION INTRAVENOUS at 15:44

## 2023-07-20 ASSESSMENT — PAIN SCALES - GENERAL: PAINLEVEL: MODERATE PAIN (4)

## 2023-07-20 NOTE — PROGRESS NOTES
Infusion Nursing Note:  Nadira Perez presents today for Reclast.    Patient seen by provider today: No   present during visit today: Not Applicable.    Note: Reclast infused over 30 minutes. Declined tylenol, per patient she took at home.    Administrations This Visit     zoledronic Acid (RECLAST) infusion 5 mg     Admin Date  07/20/2023 Action  $New Bag Dose  5 mg Rate  200 mL/hr Route  Intravenous Administered By  Renée Crabtree RN                Intravenous Access:  Peripheral IV placed in lab.    Treatment Conditions:   Latest Reference Range & Units 07/20/23 14:47   Creatinine 0.51 - 0.95 mg/dL 0.76   GFR Estimate >60 mL/min/1.73m2 83   Calcium 8.8 - 10.2 mg/dL 9.5     -95 mL/min Creatinine clearance    Post Infusion Assessment:  Patient tolerated infusion without incident.  Blood return noted pre and post infusion.  Site patent and intact, free from redness, edema or discomfort.  No evidence of extravasations.   Access discontinued per protocol.      Discharge Plan:   Discharge instructions reviewed with: Patient.  Patient and/or family verbalized understanding of discharge instructions and all questions answered.  AVS to patient via [employer]. Given a copy of Reclast information.  Patient will return as needed for to her provider for next appointment.   Patient discharged in stable condition accompanied by: self.    /79 (BP Location: Right arm, Patient Position: Sitting, Cuff Size: Adult Regular)   Pulse 71   Temp 99  F (37.2  C)   Resp 16   Wt 88.8 kg (195 lb 11.2 oz)   SpO2 97%   BMI 32.07 kg/m      Renée Crabtree RN

## 2023-07-20 NOTE — PATIENT INSTRUCTIONS
Dear Nadira Perez    Thank you for choosing [facility] ([facility]) for your infusion.  The following information is a summary of our appointment as well as important reminders.          We look forward in seeing you on your next appointment here at [facility] ([facility]).  Please don t hesitate to call us at [phone] to reschedule any of your appointments or to speak with one of the [facility] registered nurses.  It was a pleasure taking care of you today.    Sincerely,    [facility]  [address] [place] [address]  [id number]  Phone:  [phone]

## 2023-07-20 NOTE — PROGRESS NOTES
HISTORY OF PRESENT ILLNESS  Ms. Perez is a pleasant 73 year old year old female who presents to clinic today with the following:    What problem are you here for evaluation for her left great toe.   - she denies swelling and bruising.   - her chief complain is pain with toe flexion and extension.       How long have you had this problem: 7/9/2023, 10 days.     Have you had any recent imaging of this problem? Xrays/MRI/CT scans: XR of her left foot taken at appointment today.     Have you had treatments for this problem in the past?  -Medications: Tylenol and Gabapentin.   -Physical therapy: No   -Injections: No   -Surgery: No   - Rest and does wear a hard soled shoe.     How severe is this problem today? 0-10 scale: 2/10     What do you think caused this problem: Patient report that when she hyperextended her left great toe when she fell asleep on the toilet.     Does this problem or its symptoms cause difficulty for you falling asleep or staying asleep: No     Anything else you want us to know about this problem: patient has osteoporosis and has a history for multiple fractures.       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     Corneal opacity     Hypothyroidism     Osteopenia, unspecified location     Aromatase inhibitor use     Chronic pain of right knee     DDD (degenerative disc disease), lumbar     Degenerative scoliosis in adult patient     Carpal tunnel syndrome of right wrist      Peripheral neuropathy     Spinal stenosis of lumbar region with neurogenic claudication     Spondylolisthesis of lumbar region     Brain lesion     Dermatochalasis of both upper eyelids     S/P spinal fusion     Chronic bilateral low back pain     PVD (posterior vitreous detachment), left eye     Vitreous opacities of left eye     Closed nondisplaced fracture of lateral malleolus of left fibula, initial encounter     Acute left ankle pain     Sacroiliitis (H)     Lumbar radiculopathy       Current Outpatient Medications   Medication Sig Dispense Refill     acetaminophen (TYLENOL) 500 MG tablet Take 500-1,000 mg by mouth every 6 hours as needed for mild pain       amLODIPine (NORVASC) 10 MG tablet Take 1 tablet (10 mg) by mouth daily 90 tablet 3     Ascorbic Acid (VITAMIN C) 500 MG CHEW Take 1 tablet by mouth daily       atorvastatin (LIPITOR) 80 MG tablet Take 1 tablet (80 mg) by mouth daily 90 tablet 3     biotin 1000 MCG TABS tablet Take 1,000 mcg by mouth daily       Cholecalciferol (VITAMIN D3) 50 MCG (2000 UT) CAPS TAKE 3 CAPSULES (6,000 UNITS) BY MOUTH DAILY. 270 capsule 3     CRANBERRY EXTRACT PO Take 650 mg by mouth daily ~10 am  Takes 1       diclofenac (VOLTAREN) 1 % topical gel APPLY 4 GRAMS TO KNEES OR 2 GRAMS TO HANDS FOUR TIMES DAILY USING ENCLOSED DOSING CARD. 100 g 3     gabapentin (NEURONTIN) 300 MG capsule Take 4 capsules (1,200 mg) by mouth 3 times daily 1080 capsule 3     HEMP OIL OR EXTRACT OR OTHER CBD CANNABINOID, NOT MEDICAL CANNABIS, THC       HYDROcodone-acetaminophen (NORCO) 5-325 MG tablet Take 1 tab for breakthrough pain 1-2 times a week. 30 tablet 0     hydrOXYzine (VISTARIL) 25 MG capsule Take 1 capsule (25 mg) by mouth At Bedtime (Patient taking differently: Take 25 mg by mouth nightly as needed for other) 90 capsule 3     levothyroxine (SYNTHROID/LEVOTHROID) 112 MCG tablet TAKE 1/2 TABLET BY MOUTH DAILY 45 tablet 3     lisinopril (ZESTRIL) 40 MG tablet Take 1 tablet (40 mg) by mouth  daily 90 tablet 3     magnesium 250 MG tablet Take 1 tablet by mouth daily       melatonin 5 MG tablet Take 10 mg by mouth At Bedtime        methocarbamol (ROBAXIN) 500 MG tablet Take 1 tablet (500 mg) by mouth 3 times daily 40 tablet 4     metoprolol succinate ER (TOPROL XL) 50 MG 24 hr tablet Take 1 tablet (50 mg) by mouth daily 90 tablet 3     Multiple Vitamin (MULTI-VITAMIN) per tablet Take 1 tablet by mouth daily       naloxone (NARCAN) 4 MG/0.1ML nasal spray Spray 1 spray (4 mg) into one nostril alternating nostrils once as needed for opioid reversal every 2-3 minutes until assistance arrives 0.2 mL 0     ondansetron (ZOFRAN-ODT) 4 MG ODT tab Take 1 tablet (4 mg) by mouth every 12 hours as needed for nausea 180 tablet 3     spironolactone (ALDACTONE) 25 MG tablet Take 2 tablets (50 mg) by mouth daily 180 tablet 3     Vaginal Lubricant (REPHRESH) GEL Place 2 g vaginally every 3 days 2 g 3       Allergies   Allergen Reactions     Erythromycin Nausea     Penicillins Hives     Around age 6 - doesn't recall full reaction, mother told her it was hives.     Has tolerated cephalsporins.        Family History   Problem Relation Age of Onset     Neurologic Disorder Mother         Anuerysm of Cerebral Artery, Dementia     Diabetes Mother      Thyroid Disease Mother         ,     Cerebrovascular Disease Mother      Dementia Mother      Osteoporosis Mother      Heart Disease Father         AAA     Hypertension Father      Circulatory Brother         Perihperal Neurophathy     Diabetes Maternal Grandmother      Asthma Maternal Grandmother      Chronic Obstructive Pulmonary Disease Maternal Grandfather         father     Asthma Maternal Grandfather      Diabetes Maternal Aunt         x2     Melanoma Maternal Aunt      Glaucoma Maternal Aunt      Breast Cancer Cousin      Dementia Other      Cancer Other         malignant melanoma     Hypertension Other      Hypertension Other      Cerebrovascular Disease Other       Cerebrovascular Disease Other      Obesity Other      Respiratory Other      Cancer Other      Diabetes Other      Asthma Other      Macular Degeneration No family hx of      Coronary Artery Disease No family hx of      Hyperlipidemia No family hx of      Kidney Disease No family hx of      Thrombosis No family hx of      Arthritis No family hx of      Depression No family hx of      Mental Illness No family hx of      Substance Abuse No family hx of      Cystic Fibrosis No family hx of      Early Death No family hx of      Coronary Artery Disease Early Onset No family hx of      Heart Failure No family hx of      Bleeding Diathesis No family hx of      Ovarian Cancer No family hx of      Uterine Cancer No family hx of      Prostate Cancer No family hx of      Colorectal Cancer No family hx of      Pancreatic Cancer No family hx of      Lung Cancer No family hx of      Other Cancer No family hx of      Autoimmune Disease No family hx of      Unknown/Adopted No family hx of      Genetic Disorder No family hx of      Bleeding Disorder No family hx of      Clotting Disorder No family hx of      Anesthesia Reaction No family hx of      Social History     Socioeconomic History     Marital status:    Tobacco Use     Smoking status: Never     Passive exposure: Never     Smokeless tobacco: Never   Substance and Sexual Activity     Alcohol use: Not Currently     Alcohol/week: 7.0 standard drinks of alcohol     Types: 7 Glasses of wine per week     Comment: Rare to occasional     Drug use: Yes     Types: Marijuana     Sexual activity: Not Currently     Partners: Male     Birth control/protection: Abstinence   Other Topics Concern      Service No     Caffeine Concern No     Occupational Exposure No     Hobby Hazards No     Sleep Concern No     Stress Concern No     Weight Concern No     Special Diet No     Back Care No     Exercise Yes     Comment: walks 4-6x  week for 20-30 min. each     Seat Belt Yes      Self-Exams Yes     Parent/sibling w/ CABG, MI or angioplasty before 65F 55M? No   Social History Narrative    .  Recently retired. 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        She lives with a renter.         She exercises 50 minutes three times a week.     Social Determinants of Health     Intimate Partner Violence: Not At Risk (4/14/2023)    Humiliation, Afraid, Rape, and Kick questionnaire      Fear of Current or Ex-Partner: No      Emotionally Abused: No      Physically Abused: No      Sexually Abused: No       Additional medical/Social/Surgical histories reviewed in [facility] and updated as appropriate.     REVIEW OF SYSTEMS (7/20/2023)  10 point ROS of systems including Constitutional, Eyes, Respiratory, Cardiovascular, Gastroenterology, Genitourinary, Integumentary, Musculoskeletal, Psychiatric, Allergic/Immunologic were all negative except for pertinent positives noted in my HPI.     PHYSICAL EXAM  VSS    General  - normal appearance, in no obvious distress  HEENT  - conjunctivae not injected, moist mucous membranes, normocephalic/atraumatic head, ears normal appearance, no lesions, mouth normal appearance, no scars, normal dentition and teeth present  CV  - normal radial pulse  Pulm  - normal respiratory pattern, non-labored  Musculoskeletal - left great toe  - inspection: no atrophy, normal joint alignment, mild swelling at MTP joint  - palpation: no bony ttp except at IP and MTP joint of great toe, and at sesamoid bones, and some soft tissue tenderness over flexor tendons of toe, no tenderness throughout the midfoot  - ROM:  MTP 30 deg flexion   80 deg extension with some pain   IP 50 deg flexion   50 deg extension with some pain     - strength: 5/5 toe flexion, 5/5 toe extension, 5/5 dorsiflexion, 5/5 plantar flexion  - special tests:  (-) varus at IP joints  (-) valgus at IP joints  Neuro  - no numbness, no motor deficit,  grossly normal coordination, normal muscle tone  Skin  - no ecchymosis, erythema, warmth, or induration, no obvious rash  Psych  - interactive, appropriate, normal mood and affect    ASSESSMENT & PLAN  74 yo female with osteoporosis, concern for left great toe fracture, no fracture identified, 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 Stiff soled shoe  F/u PRN if worsens  Can consider MTP joint injection at some point if continues to hurt    Appropriate PPE was utilized for prevention of spread of Covid-19.  Willis Toth MD, CAQSM

## 2023-07-20 NOTE — LETTER
7/20/2023      RE: Nadira Perez  [id number]  [facility] [address]     Dear Colleague,    Thank you for referring your patient, Nadira Perez, to the [facility] [place]. Please see a copy of my visit note below.    HISTORY OF PRESENT ILLNESS  Ms. Perez is a pleasant 73 year old year old female who presents to clinic today with the following:    What problem are you here for evaluation for her left great toe.   - she denies swelling and bruising.   - her chief complain is pain with toe flexion and extension.       How long have you had this problem: 7/9/2023, 10 days.     Have you had any recent imaging of this problem? Xrays/MRI/CT scans: XR of her left foot taken at appointment today.     Have you had treatments for this problem in the past?  -Medications: Tylenol and Gabapentin.   -Physical therapy: No   -Injections: No   -Surgery: No   - Rest and does wear a hard soled shoe.     How severe is this problem today? 0-10 scale: 2/10     What do you think caused this problem: Patient report that when she hyperextended her left great toe when she fell asleep on the toilet.     Does this problem or its symptoms cause difficulty for you falling asleep or staying asleep: No     Anything else you want us to know about this problem: patient has osteoporosis and has a history for multiple fractures.       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    Corneal opacity     Hypothyroidism    Osteopenia, unspecified location    Aromatase inhibitor use    Chronic pain of right knee    DDD (degenerative disc disease), lumbar    Degenerative scoliosis in adult patient    Carpal tunnel syndrome of right wrist    Peripheral neuropathy    Spinal stenosis of lumbar region with neurogenic claudication    Spondylolisthesis of lumbar region    Brain lesion    Dermatochalasis of both upper eyelids    S/P spinal fusion    Chronic bilateral low back pain    PVD (posterior vitreous detachment), left eye    Vitreous opacities of left eye    Closed nondisplaced fracture of lateral malleolus of left fibula, initial encounter    Acute left ankle pain    Sacroiliitis (H)    Lumbar radiculopathy       Current Outpatient Medications   Medication Sig Dispense Refill    acetaminophen (TYLENOL) 500 MG tablet Take 500-1,000 mg by mouth every 6 hours as needed for mild pain      amLODIPine (NORVASC) 10 MG tablet Take 1 tablet (10 mg) by mouth daily 90 tablet 3    Ascorbic Acid (VITAMIN C) 500 MG CHEW Take 1 tablet by mouth daily      atorvastatin (LIPITOR) 80 MG tablet Take 1 tablet (80 mg) by mouth daily 90 tablet 3    biotin 1000 MCG TABS tablet Take 1,000 mcg by mouth daily      Cholecalciferol (VITAMIN D3) 50 MCG (2000 UT) CAPS TAKE 3 CAPSULES (6,000 UNITS) BY MOUTH DAILY. 270 capsule 3    CRANBERRY EXTRACT PO Take 650 mg by mouth daily ~10 am  Takes 1      diclofenac (VOLTAREN) 1 % topical gel APPLY 4 GRAMS TO KNEES OR 2 GRAMS TO HANDS FOUR TIMES DAILY USING ENCLOSED DOSING CARD. 100 g 3    gabapentin (NEURONTIN) 300 MG capsule Take 4 capsules (1,200 mg) by mouth 3 times daily 1080 capsule 3    HEMP OIL OR EXTRACT OR OTHER CBD CANNABINOID, NOT MEDICAL CANNABIS, THC      HYDROcodone-acetaminophen (NORCO) 5-325 MG tablet Take 1 tab for breakthrough pain 1-2 times a week. 30 tablet 0    hydrOXYzine (VISTARIL) 25 MG capsule Take 1 capsule (25 mg) by mouth At Bedtime (Patient taking differently: Take 25 mg by  mouth nightly as needed for other) 90 capsule 3    levothyroxine (SYNTHROID/LEVOTHROID) 112 MCG tablet TAKE 1/2 TABLET BY MOUTH DAILY 45 tablet 3    lisinopril (ZESTRIL) 40 MG tablet Take 1 tablet (40 mg) by mouth daily 90 tablet 3    magnesium 250 MG tablet Take 1 tablet by mouth daily      melatonin 5 MG tablet Take 10 mg by mouth At Bedtime       methocarbamol (ROBAXIN) 500 MG tablet Take 1 tablet (500 mg) by mouth 3 times daily 40 tablet 4    metoprolol succinate ER (TOPROL XL) 50 MG 24 hr tablet Take 1 tablet (50 mg) by mouth daily 90 tablet 3    Multiple Vitamin (MULTI-VITAMIN) per tablet Take 1 tablet by mouth daily      naloxone (NARCAN) 4 MG/0.1ML nasal spray Spray 1 spray (4 mg) into one nostril alternating nostrils once as needed for opioid reversal every 2-3 minutes until assistance arrives 0.2 mL 0    ondansetron (ZOFRAN-ODT) 4 MG ODT tab Take 1 tablet (4 mg) by mouth every 12 hours as needed for nausea 180 tablet 3    spironolactone (ALDACTONE) 25 MG tablet Take 2 tablets (50 mg) by mouth daily 180 tablet 3    Vaginal Lubricant (REPHRESH) GEL Place 2 g vaginally every 3 days 2 g 3       Allergies   Allergen Reactions    Erythromycin Nausea    Penicillins Hives     Around age 6 - doesn't recall full reaction, mother told her it was hives.     Has tolerated cephalsporins.        Family History   Problem Relation Age of Onset    Neurologic Disorder Mother         Anuerysm of Cerebral Artery, Dementia    Diabetes Mother     Thyroid Disease Mother         ,    Cerebrovascular Disease Mother     Dementia Mother     Osteoporosis Mother     Heart Disease Father         AAA    Hypertension Father     Circulatory Brother         Perihperal Neurophathy    Diabetes Maternal Grandmother     Asthma Maternal Grandmother     Chronic Obstructive Pulmonary Disease Maternal Grandfather         father    Asthma Maternal Grandfather     Diabetes Maternal Aunt         x2    Melanoma Maternal Aunt     Glaucoma Maternal Aunt      Breast Cancer Cousin     Dementia Other     Cancer Other         malignant melanoma    Hypertension Other     Hypertension Other     Cerebrovascular Disease Other     Cerebrovascular Disease Other     Obesity Other     Respiratory Other     Cancer Other     Diabetes Other     Asthma Other     Macular Degeneration No family hx of     Coronary Artery Disease No family hx of     Hyperlipidemia No family hx of     Kidney Disease No family hx of     Thrombosis No family hx of     Arthritis No family hx of     Depression No family hx of     Mental Illness No family hx of     Substance Abuse No family hx of     Cystic Fibrosis No family hx of     Early Death No family hx of     Coronary Artery Disease Early Onset No family hx of     Heart Failure No family hx of     Bleeding Diathesis No family hx of     Ovarian Cancer No family hx of     Uterine Cancer No family hx of     Prostate Cancer No family hx of     Colorectal Cancer No family hx of     Pancreatic Cancer No family hx of     Lung Cancer No family hx of     Other Cancer No family hx of     Autoimmune Disease No family hx of     Unknown/Adopted No family hx of     Genetic Disorder No family hx of     Bleeding Disorder No family hx of     Clotting Disorder No family hx of     Anesthesia Reaction No family hx of      Social History     Socioeconomic History    Marital status:    Tobacco Use    Smoking status: Never     Passive exposure: Never    Smokeless tobacco: Never   Substance and Sexual Activity    Alcohol use: Not Currently     Alcohol/week: 7.0 standard drinks of alcohol     Types: 7 Glasses of wine per week     Comment: Rare to occasional    Drug use: Yes     Types: Marijuana    Sexual activity: Not Currently     Partners: Male     Birth control/protection: Abstinence   Other Topics Concern     Service No    Caffeine Concern No    Occupational Exposure No    Hobby Hazards No    Sleep Concern No    Stress Concern No    Weight Concern No     Special Diet No    Back Care No    Exercise Yes     Comment: walks 4-6x  week for 20-30 min. each    Seat Belt Yes    Self-Exams Yes    Parent/sibling w/ CABG, MI or angioplasty before 65F 55M? No   Social History Narrative    .  Recently retired. 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        She lives with a renter.         She exercises 50 minutes three times a week.     Social Determinants of Health     Intimate Partner Violence: Not At Risk (4/14/2023)    Humiliation, Afraid, Rape, and Kick questionnaire     Fear of Current or Ex-Partner: No     Emotionally Abused: No     Physically Abused: No     Sexually Abused: No       Additional medical/Social/Surgical histories reviewed in EPIC and updated as appropriate.     REVIEW OF SYSTEMS (7/20/2023)  10 point ROS of systems including Constitutional, Eyes, Respiratory, Cardiovascular, Gastroenterology, Genitourinary, Integumentary, Musculoskeletal, Psychiatric, Allergic/Immunologic were all negative except for pertinent positives noted in my HPI.     PHYSICAL EXAM  VSS    General  - normal appearance, in no obvious distress  HEENT  - conjunctivae not injected, moist mucous membranes, normocephalic/atraumatic head, ears normal appearance, no lesions, mouth normal appearance, no scars, normal dentition and teeth present  CV  - normal radial pulse  Pulm  - normal respiratory pattern, non-labored  Musculoskeletal - left great toe  - inspection: no atrophy, normal joint alignment, mild swelling at MTP joint  - palpation: no bony ttp except at IP and MTP joint of great toe, and at sesamoid bones, and some soft tissue tenderness over flexor tendons of toe, no tenderness throughout the midfoot  - ROM:  MTP 30 deg flexion   80 deg extension with some pain   IP 50 deg flexion   50 deg extension with some pain     - strength: 5/5 toe flexion, 5/5 toe extension, 5/5 dorsiflexion, 5/5 plantar  flexion  - special tests:  (-) varus at IP joints  (-) valgus at IP joints  Neuro  - no numbness, no motor deficit, grossly normal coordination, normal muscle tone  Skin  - no ecchymosis, erythema, warmth, or induration, no obvious rash  Psych  - interactive, appropriate, normal mood and affect    ASSESSMENT & PLAN  74 yo female with osteoporosis, concern for left great toe fracture, no fracture identified, 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 Stiff soled shoe  F/u PRN if worsens  Can consider MTP joint injection at some point if continues to hurt    Appropriate PPE was utilized for prevention of spread of Covid-19.  Willis Toth MD, CAM        Again, thank you for allowing me to participate in the care of your patient.      Sincerely,    Willis Toth MD

## 2023-07-20 NOTE — NURSING NOTE
Jelly Jameson RN on 7/20/2023 at 2:52 PM  .  Chief Complaint   Patient presents with     Labs Only     Venipuncture, vitals checked

## 2023-07-21 RX ORDER — HYDROXYZINE PAMOATE 25 MG/1
CAPSULE ORAL
Qty: 90 CAPSULE | Refills: 2 | Status: SHIPPED | OUTPATIENT
Start: 2023-07-21 | End: 2023-11-30

## 2023-07-21 NOTE — TELEPHONE ENCOUNTER
HYDROXYZINE LINO 25 MG CAP          Virtual Visit    3/30/2023  [facility] [place]   Matilde Quiñonez, APRN CNP  Internal Medicine

## 2023-08-07 ENCOUNTER — TELEPHONE (OUTPATIENT)
Dept: ANESTHESIOLOGY | Facility: CLINIC | Age: 73
End: 2023-08-07

## 2023-08-07 ENCOUNTER — VIRTUAL VISIT (OUTPATIENT)
Dept: ANESTHESIOLOGY | Facility: CLINIC | Age: 73
End: 2023-08-07
Attending: ANESTHESIOLOGY
Payer: COMMERCIAL

## 2023-08-07 DIAGNOSIS — M54.17 LUMBOSACRAL RADICULOPATHY: Primary | ICD-10-CM

## 2023-08-07 PROCEDURE — 99214 OFFICE O/P EST MOD 30 MIN: CPT | Mod: 95 | Performed by: ANESTHESIOLOGY

## 2023-08-07 ASSESSMENT — ENCOUNTER SYMPTOMS
TASTE DISTURBANCE: 0
LOSS OF CONSCIOUSNESS: 0
COUGH DISTURBING SLEEP: 0
SHORTNESS OF BREATH: 1
SINUS PAIN: 0
SMELL DISTURBANCE: 0
SORE THROAT: 0
EYE WATERING: 0
SPEECH CHANGE: 0
HEMATURIA: 0
PANIC: 0
MYALGIAS: 1
ORTHOPNEA: 0
HEMOPTYSIS: 0
DIZZINESS: 1
NECK MASS: 0
LEG PAIN: 1
DISTURBANCES IN COORDINATION: 1
DOUBLE VISION: 0
HOARSE VOICE: 0
DECREASED CONCENTRATION: 0
DIFFICULTY URINATING: 0
SLEEP DISTURBANCES DUE TO BREATHING: 0
JOINT SWELLING: 1
INSOMNIA: 1
PARALYSIS: 0
SNORES LOUDLY: 0
DEPRESSION: 0
TINGLING: 1
DECREASED LIBIDO: 1
FLANK PAIN: 0
WHEEZING: 1
DYSURIA: 0
EYE REDNESS: 0
HEADACHES: 0
NECK PAIN: 1
HYPOTENSION: 0
DYSPNEA ON EXERTION: 0
SINUS CONGESTION: 0
EXERCISE INTOLERANCE: 0
EYE IRRITATION: 0
COUGH: 0
SPUTUM PRODUCTION: 0
MEMORY LOSS: 0
TROUBLE SWALLOWING: 1
HOT FLASHES: 0
STIFFNESS: 1
MUSCLE WEAKNESS: 1
WEAKNESS: 1
TREMORS: 0
EYE PAIN: 0
POSTURAL DYSPNEA: 0
NERVOUS/ANXIOUS: 0
MUSCLE CRAMPS: 1
SEIZURES: 0
ARTHRALGIAS: 1
LIGHT-HEADEDNESS: 0
BACK PAIN: 1
NUMBNESS: 1
PALPITATIONS: 1
HYPERTENSION: 1
SYNCOPE: 0

## 2023-08-07 ASSESSMENT — ANXIETY QUESTIONNAIRES
7. FEELING AFRAID AS IF SOMETHING AWFUL MIGHT HAPPEN: NOT AT ALL
5. BEING SO RESTLESS THAT IT IS HARD TO SIT STILL: SEVERAL DAYS
6. BECOMING EASILY ANNOYED OR IRRITABLE: NOT AT ALL
4. TROUBLE RELAXING: SEVERAL DAYS
IF YOU CHECKED OFF ANY PROBLEMS ON THIS QUESTIONNAIRE, HOW DIFFICULT HAVE THESE PROBLEMS MADE IT FOR YOU TO DO YOUR WORK, TAKE CARE OF THINGS AT HOME, OR GET ALONG WITH OTHER PEOPLE: NOT DIFFICULT AT ALL
1. FEELING NERVOUS, ANXIOUS, OR ON EDGE: NOT AT ALL
GAD7 TOTAL SCORE: 2
GAD7 TOTAL SCORE: 2
3. WORRYING TOO MUCH ABOUT DIFFERENT THINGS: NOT AT ALL
2. NOT BEING ABLE TO STOP OR CONTROL WORRYING: NOT AT ALL

## 2023-08-07 ASSESSMENT — PAIN SCALES - PAIN ENJOYMENT GENERAL ACTIVITY SCALE (PEG)
AVG_PAIN_PASTWEEK: 5
PEG_TOTALSCORE: 5.67
INTERFERED_ENJOYMENT_LIFE: 4
INTERFERED_GENERAL_ACTIVITY: 8

## 2023-08-07 NOTE — TELEPHONE ENCOUNTER
Patient is scheduled for surgery with Dr. Umaña    Spoke with: patient    Date of Surgery: 09/12/23    Location: [facility] [place]    Informed patient they will need an adult  yes    Additional comments: Patient is aware of date and time of the procedure.        Dory Bowman MA on 8/7/2023 at 12:58 PM

## 2023-08-07 NOTE — LETTER
8/7/2023       RE: Nadira Perez  [address]  [facility] [id number]       Dear Colleague,    Thank you for referring your patient, Nadira Perez, to the [facility] [place] at [facility]. Please see a copy of my visit note below.    Virtual Visit Details    Type of service:  Video Visit   Video Start Time: 1140 AM    Video End Time:11:59 AM    Originating Location (pt. Location): Home    Distant Location (provider location):  On-site  Platform used for Video Visit: [facility]    Pain clinic follow up note    HPI:   Nadira Perez is a 73 year old year old female  who presents to the pain clinic with chronic pain problems, s/p LESI    Patient Supplied Answers To the  Pain Questionnaire      8/7/2023    10:48 AM    Pain -  Patient Entered Questionnaire/Answers   What number best describes your pain right now:  0 = No pain  to  10 = Worst pain imaginable 6   How would you describe the pain cramping    sharp    numbness    dull, aching    pressure   Which of the following worsen your pain standing    walking    exercise   Which of the following improve or reduce your pain lying down    sitting    exercise    relaxation    thinking about something else   What number best describes your average pain for the past week:  0 = No pain  to  10 = Worst pain imaginable 4   What number best describes your LOWEST pain in past 24 hours:  0 = No pain  to  10 = Worst pain imaginable 5   What number best describes your WORST pain in past 24 hours:  0 = No pain  to  10 = Worst pain imaginable 8   When is your pain worst Constant   What non-medicine treatments have you already had for your pain pain clinic    physical therapy    relaxation training    acupuncture    spine injections (shots)    other nerve blocks    exercise             Patient reports that first few days was great. After 3 days the right side is doing  fine.     The left side pain is improved and the relief lasted for about a month.     She experiences pain and numbness in the posterior aspect. Pain has been returning over the last 3 weeks. Pain is especially worse after her road trip.     ROS:  Review of Systems   HENT:  Negative for ear discharge, ear pain, hearing loss, nosebleeds, sinus pain, sore throat and tinnitus.    Eyes:  Negative for double vision, pain and redness.   Respiratory:  Positive for shortness of breath and wheezing. Negative for cough, hemoptysis and sputum production.    Cardiovascular:  Positive for palpitations. Negative for chest pain and orthopnea.   Genitourinary:  Positive for urgency. Negative for dysuria, flank pain and hematuria.   Musculoskeletal:  Positive for back pain, myalgias and neck pain.   Neurological:  Positive for dizziness, tingling and weakness. Negative for tremors, speech change, seizures, loss of consciousness and headaches.   Psychiatric/Behavioral:  Negative for depression and memory loss. The patient has insomnia. The patient is not nervous/anxious.    All other systems reviewed and are negative.    Significant Medical History:   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    Thyroid disease           Past Surgical History:  Past Surgical History:   Procedure Laterality Date    ABDOMEN SURGERY      ovarian cyst, mesh    ARTHRODESIS WRIST Right     ARTHRODESIS WRIST  02/14/2013    Procedure: ARTHRODESIS WRIST;  left wrist scaphoid excision, four bone fusion, iliac crest bone graft  ( Mac with  block);  Surgeon: Av Mendez MD;  [facility]    BIOPSY      skin, vaginal    BLEPHAROPLASTY BILATERAL Bilateral 5/6/2022    Procedure: UPPER BLEPHAROPLASTY, BILATERAL;  Surgeon: Jemal Sanchez MD;  [place] [facility] [place]    CATARACT IOL, RT/LT Right 03/13/2018    CATARACT IOL, RT/LT Left 02/20/2018    COLONOSCOPY  12/24/2013    Procedure: COMBINED COLONOSCOPY, SINGLE BIOPSY/POLYPECTOMY BY BIOPSY;  COLONOSCOPY;  Surgeon: Dom Alvarez MD;  Location:  GI    COSMETIC BLEPHAROPLASTY LOWER LIDS BILATERAL Bilateral 5/6/2022    Procedure: BLEPHAROPLASTY, LOWER EYELID, BILATERAL, COSMETIC;  Surgeon: Jemal Sanchez MD;  [place] [facility] [place]    ESOPHAGOSCOPY, GASTROSCOPY, DUODENOSCOPY (EGD), COMBINED N/A 11/23/2016    Procedure: COMBINED ESOPHAGOSCOPY, GASTROSCOPY, DUODENOSCOPY (EGD);  Surgeon: Quinten Feliciano MD;  Location:  GI    EXTERNAL EAR SURGERY      right    EYE SURGERY      radial keratomy    FUSION, SPINE, INTERBODY, OBLIQUE ANTERIOR AND LUMBAR, 2 LEVELS, POST APPROACH, USING OTS N/A 11/18/2021    Procedure: Part 1: Oblique Anterior Interbody Fusion at Lumbar 5 to sacral 1 with use of Bone Morphogenic Protein,;  Surgeon: Serge Ramirez MD;  [facility]    GRAFT BONE FROM ILIAC CREST  02/14/2013    Procedure: GRAFT BONE FROM ILIAC CREST;  mac with block and local infilitration;  Surgeon: Av Mendez MD;  [facility]    HC BREATH HYDROGEN TEST N/A 10/14/2016    Procedure: HYDROGEN BREATH TEST;  Surgeon: Cheri Barron MD;  Location:  GI    HERNIA REPAIR      umbilical age 18 mos.    HYSTERECTOMY TOTAL ABDOMINAL  05/03/2000    INJECT EPIDURAL LUMBAR N/A 5/23/2023    Procedure: L3-4 interlaminar epidural steroid injection with fluoroscopy ( left> right);  Surgeon: Natasha Umaña MD;  [place] [facility] [place]    INJECT JOINT SACROILIAC Left 4/27/2023    Procedure: Left sacroiliac joint steroid injection with fluoroscopy;  Surgeon: Natasha Umaña MD;   Location: [facility] OR    MASTECTOMY MODIFIED RADICAL Bilateral     bilateral; right breast prophylactic    OPTICAL TRACKING SYSTEM FUSION POSTERIOR SPINE LUMBAR N/A 11/18/2021    Procedure: Open Posterior Instrumented Spinal Fusion at Lumbar 5 to Sacral 1, with grimm aguayo osteotomy, use of O-Arm/Stealth;  Surgeon: Serge Ramirez MD;  [facility]    PARATHYROIDECTOMY  09/23/2004    R SUPERIOR    PARATHYROIDECTOMY  09/23/2004    parathyroid resection, subtotal    RELEASE CARPAL TUNNEL Right 12/2/2021    Procedure: RIGHT CARPAL TUNNEL RELEASE, RIGHT WRIST HARDWARE REMOVAL, RIGHT CARPAL BOSS EXCISION;  Surgeon: Rolan Conley MD;  Location: [facility] OR    REMOVE HARDWARE HAND  09/24/2013    Procedure: REMOVE HARDWARE HAND;  Left Hand Screw Removal       RHINOPLASTY  1968    thyr proc skin closed cosmetic manner by subcuticular stitch  01/23/2009    THYROPLASTY  10/09/2009    TONSILLECTOMY  1977    VITRECTOMY PARSPLANA WITH 25 GAUGE SYSTEM Left 6/20/2022    Procedure: LEFT EYE VITRECTOMY, PARS PLANA APPROACH, USING 25-GAUGE INSTRUMENTS, laser;  Surgeon: Felix Carlos MD;  Location: [facility] OR    WRIST SURGERY      wrist arthrodesis          Family History:  Family History   Problem Relation Age of Onset    Neurologic Disorder Mother         Anuerysm of Cerebral Artery, Dementia    Diabetes Mother     Thyroid Disease Mother         ,    Cerebrovascular Disease Mother     Dementia Mother     Osteoporosis Mother     Heart Disease Father         AAA    Hypertension Father     Circulatory Brother         Perihperal Neurophathy    Diabetes Maternal Grandmother     Asthma Maternal Grandmother     Chronic Obstructive Pulmonary Disease Maternal Grandfather         father    Asthma Maternal Grandfather     Diabetes Maternal Aunt         x2    Melanoma Maternal Aunt     Glaucoma Maternal Aunt     Breast Cancer Cousin     Dementia Other     Cancer Other         malignant melanoma    Hypertension Other      Hypertension Other     Cerebrovascular Disease Other     Cerebrovascular Disease Other     Obesity Other     Respiratory Other     Cancer Other     Diabetes Other     Asthma Other     Macular Degeneration No family hx of     Coronary Artery Disease No family hx of     Hyperlipidemia No family hx of     Kidney Disease No family hx of     Thrombosis No family hx of     Arthritis No family hx of     Depression No family hx of     Mental Illness No family hx of     Substance Abuse No family hx of     Cystic Fibrosis No family hx of     Early Death No family hx of     Coronary Artery Disease Early Onset No family hx of     Heart Failure No family hx of     Bleeding Diathesis No family hx of     Ovarian Cancer No family hx of     Uterine Cancer No family hx of     Prostate Cancer No family hx of     Colorectal Cancer No family hx of     Pancreatic Cancer No family hx of     Lung Cancer No family hx of     Other Cancer No family hx of     Autoimmune Disease No family hx of     Unknown/Adopted No family hx of     Genetic Disorder No family hx of     Bleeding Disorder No family hx of     Clotting Disorder No family hx of     Anesthesia Reaction No family hx of      Social History:  Social History     Socioeconomic History    Marital status:      Spouse name: Not on file    Number of children: Not on file    Years of education: Not on file    Highest education level: Not on file   Occupational History    Not on file   Tobacco Use    Smoking status: Never     Passive exposure: Never    Smokeless tobacco: Never   Substance and Sexual Activity    Alcohol use: Not Currently     Alcohol/week: 7.0 standard drinks of alcohol     Types: 7 Glasses of wine per week     Comment: Rare to occasional    Drug use: Yes     Types: Marijuana    Sexual activity: Not Currently     Partners: Male     Birth control/protection: Abstinence   Other Topics Concern     Service No    Blood Transfusions Not Asked    Caffeine Concern No     Occupational Exposure No    Hobby Hazards No    Sleep Concern No    Stress Concern No    Weight Concern No    Special Diet No    Back Care No    Exercise Yes     Comment: walks 4-6x  week for 20-30 min. each    Bike Helmet Not Asked    Seat Belt Yes    Self-Exams Yes    Parent/sibling w/ CABG, MI or angioplasty before 65F 55M? No   Social History Narrative    .  Recently retired. 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        She lives with a renter.         She exercises 50 minutes three times a week.     Social Determinants of Health     Financial Resource Strain: Not on file   Food Insecurity: Not on file   Transportation Needs: Not on file   Physical Activity: Not on file   Stress: Not on file   Social Connections: Not on file   Intimate Partner Violence: Not At Risk (4/14/2023)    Humiliation, Afraid, Rape, and Kick questionnaire     Fear of Current or Ex-Partner: No     Emotionally Abused: No     Physically Abused: No     Sexually Abused: No   Housing Stability: Not on file     Social History     Social History Narrative    .  Recently retired. 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        She lives with a renter.         She exercises 50 minutes three times a week.          Allergies:  Allergies   Allergen Reactions    Erythromycin Nausea    Penicillins Hives     Around age 6 - doesn't recall full reaction, mother told her it was hives.     Has tolerated cephalsporins.        Current Medications:   Current Outpatient Medications   Medication Sig Dispense Refill    acetaminophen (TYLENOL) 500 MG tablet Take 500-1,000 mg by mouth every 6 hours as needed for mild pain      amLODIPine (NORVASC) 10 MG tablet Take 1 tablet (10 mg) by mouth daily 90 tablet 3    Ascorbic Acid (VITAMIN C) 500 MG CHEW Take 1 tablet by mouth daily      atorvastatin  (LIPITOR) 80 MG tablet Take 1 tablet (80 mg) by mouth daily 90 tablet 3    biotin 1000 MCG TABS tablet Take 1,000 mcg by mouth daily      Cholecalciferol (VITAMIN D3) 50 MCG (2000 UT) CAPS TAKE 3 CAPSULES (6,000 UNITS) BY MOUTH DAILY. 270 capsule 3    CRANBERRY EXTRACT PO Take 650 mg by mouth daily ~10 am  Takes 1      diclofenac (VOLTAREN) 1 % topical gel Apply to toe or other joints painful 100 g 3    gabapentin (NEURONTIN) 300 MG capsule Take 4 capsules (1,200 mg) by mouth 3 times daily 1080 capsule 3    HEMP OIL OR EXTRACT OR OTHER CBD CANNABINOID, NOT MEDICAL CANNABIS, THC      HYDROcodone-acetaminophen (NORCO) 5-325 MG tablet Take 1 tab for breakthrough pain 1-2 times a week. 30 tablet 0    hydrOXYzine (VISTARIL) 25 MG capsule TAKE 1 CAPSULE BY MOUTH AT BEDTIME. 90 capsule 2    levothyroxine (SYNTHROID/LEVOTHROID) 112 MCG tablet TAKE 1/2 TABLET BY MOUTH DAILY 45 tablet 3    lisinopril (ZESTRIL) 40 MG tablet Take 1 tablet (40 mg) by mouth daily 90 tablet 3    magnesium 250 MG tablet Take 1 tablet by mouth daily      melatonin 5 MG tablet Take 10 mg by mouth At Bedtime       methocarbamol (ROBAXIN) 500 MG tablet Take 1 tablet (500 mg) by mouth 3 times daily 40 tablet 4    metoprolol succinate ER (TOPROL XL) 50 MG 24 hr tablet Take 1 tablet (50 mg) by mouth daily 90 tablet 3    Multiple Vitamin (MULTI-VITAMIN) per tablet Take 1 tablet by mouth daily      naloxone (NARCAN) 4 MG/0.1ML nasal spray Spray 1 spray (4 mg) into one nostril alternating nostrils once as needed for opioid reversal every 2-3 minutes until assistance arrives 0.2 mL 0    ondansetron (ZOFRAN-ODT) 4 MG ODT tab Take 1 tablet (4 mg) by mouth every 12 hours as needed for nausea 180 tablet 3    spironolactone (ALDACTONE) 25 MG tablet Take 2 tablets (50 mg) by mouth daily 180 tablet 3    tiZANidine (ZANAFLEX) 4 MG tablet Take 0.5-1 tablets (2-4 mg) by mouth 3 times daily 60 tablet 0    Vaginal Lubricant (REPHRESH) GEL Place 2 g vaginally every 3 days  2 g 3        Physical Exam:     There were no vitals taken for this visit.    General Appearance: No distress, seated comfortably  Mood: Euthymic  HE ENT: Non constricted pupils  Respiratory: Non labored breathing        ASSESSMENT AND PLAN:     Encounter Diagnosis:  Lumbar stenosis and neurogenic claudication  Lumbosacral spondylosis  L5/S1 fusion  Spinal stenosis  Peripheral neuropathy       Nadira Perez is a 73 year old year old female  who presents to the pain clinic with low back and left leg pain, s/p L3-4 GREGOR    RECOMMENDATIONS:     1. Medications: no changes. She is using medical marijuana.    2. Procedure: We are scheduling the patient for caudal epidural steroid injection with fluoroscopy in the procedure suite. Risks/benefits/alternatives were discussed.     3. Education: continue PT exercises.     Follow up: 3 months.       Answers submitted by the patient for this visit:  DIANNE-7 (Submitted on 8/7/2023)  DIANNE 7 TOTAL SCORE: 2  Symptoms you have experienced in the last 30 days (Submitted on 8/7/2023)  General Symptoms: No  Skin Symptoms: No  HENT Symptoms: Yes  EYE SYMPTOMS: Yes  HEART SYMPTOMS: Yes  LUNG SYMPTOMS: Yes  INTESTINAL SYMPTOMS: No  URINARY SYMPTOMS: Yes  GYNECOLOGIC SYMPTOMS: Yes  BREAST SYMPTOMS: No  SKELETAL SYMPTOMS: Yes  BLOOD SYMPTOMS: No  NERVOUS SYSTEM SYMPTOMS: Yes  MENTAL HEALTH SYMPTOMS: Yes  Please answer the questions below to tell us what conditions you are experiencing: (Submitted on 8/7/2023)  Congestion: No  Trouble swallowing: Yes   Voice hoarseness: No  Mouth sores: Yes  Tooth pain: No  Gum tenderness: No  Bleeding gums: Yes  Change in taste: No  Change in sense of smell: No  Dry mouth: Yes  Hearing aid used: Yes  Neck lump: No  Please answer the questions below to tell us what conditions you are experiencing: (Submitted on 8/7/2023)  Vision loss: No  Dry eyes: Yes  Watery eyes: No  Eye bulging: No  Flashing of lights: No  Spots: No  Floaters: Yes  Crossed eyes:  No  Tunnel Vision: No  Yellowing of eyes: No  Eye irritation: No  Please answer the questions below to tell us what condition you are experiencing: (Submitted on 8/7/2023)  Pain in legs with walking: Yes  Fingers or toes appear blue: No  High blood pressure: Yes  Low blood pressure: No  Fainting: No  Murmurs: Yes  Pacemaker: No  Varicose veins: Yes  Edema or swelling: Yes  Wake up at night with shortness of breath: No  Light-headedness: No  Exercise intolerance: No  Please answer the questions below to tell us what condition you are experiencing: (Submitted on 8/7/2023)  Snoring: No  Difficulty breathing on exertion: No  Nighttime Cough: No  Difficulty breathing when lying flat: No  Please answer the questions below to tell us what condition you are experiencing: (Submitted on 8/7/2023)  Trouble holding urine or incontinence: Yes  Increased frequency of urination: Yes  Decreased frequency of urination: No  Frequent nighttime urination: Yes  Difficulty emptying bladder: No  Please answer the questions below to tell us what condition you are experiencing: (Submitted on 8/7/2023)  Bleeding or spotting between periods: No  Heavy or painful periods: No  Irregular periods: No  Vaginal discharge: No  Hot flashes: No  Vaginal dryness: Yes  Genital ulcers: No  Reduced libido: Yes  Painful intercourse: Yes  Difficulty with sexual arousal: Yes  Post-menopausal bleeding: No  Please answer the questions below to tell us what condition you are experiencing: (Submitted on 8/7/2023)  Swollen joints: Yes  Joint pain: Yes  Bone pain: Yes  Muscle cramps: Yes  Muscle weakness: Yes  Joint stiffness: Yes  Bone fracture: Yes  Please answer the questions below to tell us what condition you are experiencing: (Submitted on 8/7/2023)  Trouble with coordination: Yes  Difficulty walking: Yes  Paralysis: No  Numbness: Yes  Please answer the questions below to tell us what condition you are experiencing: (Submitted on 8/7/2023)  Trouble thinking  or concentrating: No  Mood changes: No  Panic attacks: No      Again, thank you for allowing me to participate in the care of your patient.      Sincerely,    Natasha Umaña MD

## 2023-08-07 NOTE — PROGRESS NOTES
Virtual Visit Details    Type of service:  Video Visit   Video Start Time: 1140 AM    Video End Time:11:59 AM    Originating Location (pt. Location): Home    Distant Location (provider location):  On-site  Platform used for Video Visit: [facility]    Pain clinic follow up note    HPI:   Nadira Perez is a 73 year old year old female  who presents to the pain clinic with chronic pain problems, s/p LESI    Patient Supplied Answers To the  Pain Questionnaire      8/7/2023    10:48 AM    Pain -  Patient Entered Questionnaire/Answers   What number best describes your pain right now:  0 = No pain  to  10 = Worst pain imaginable 6   How would you describe the pain cramping    sharp    numbness    dull, aching    pressure   Which of the following worsen your pain standing    walking    exercise   Which of the following improve or reduce your pain lying down    sitting    exercise    relaxation    thinking about something else   What number best describes your average pain for the past week:  0 = No pain  to  10 = Worst pain imaginable 4   What number best describes your LOWEST pain in past 24 hours:  0 = No pain  to  10 = Worst pain imaginable 5   What number best describes your WORST pain in past 24 hours:  0 = No pain  to  10 = Worst pain imaginable 8   When is your pain worst Constant   What non-medicine treatments have you already had for your pain pain clinic    physical therapy    relaxation training    acupuncture    spine injections (shots)    other nerve blocks    exercise             Patient reports that first few days was great. After 3 days the right side is doing fine.     The left side pain is improved and the relief lasted for about a month.     She experiences pain and numbness in the posterior aspect. Pain has been returning over the last 3 weeks. Pain is especially worse after her road trip.     ROS:  Review of Systems   HENT:  Negative for ear discharge, ear pain, hearing loss, nosebleeds, sinus  pain, sore throat and tinnitus.    Eyes:  Negative for double vision, pain and redness.   Respiratory:  Positive for shortness of breath and wheezing. Negative for cough, hemoptysis and sputum production.    Cardiovascular:  Positive for palpitations. Negative for chest pain and orthopnea.   Genitourinary:  Positive for urgency. Negative for dysuria, flank pain and hematuria.   Musculoskeletal:  Positive for back pain, myalgias and neck pain.   Neurological:  Positive for dizziness, tingling and weakness. Negative for tremors, speech change, seizures, loss of consciousness and headaches.   Psychiatric/Behavioral:  Negative for depression and memory loss. The patient has insomnia. The patient is not nervous/anxious.    All other systems reviewed and are negative.    Significant Medical History:   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    Thyroid disease           Past Surgical History:  Past Surgical History:   Procedure Laterality Date    ABDOMEN SURGERY      ovarian cyst, mesh    ARTHRODESIS WRIST Right     ARTHRODESIS WRIST  02/14/2013    Procedure: ARTHRODESIS WRIST;  left wrist scaphoid excision, four bone fusion, iliac crest bone graft  ( Mac with block);  Surgeon: Av Mendez MD;  [facility]    BIOPSY      skin, vaginal    BLEPHAROPLASTY BILATERAL Bilateral 5/6/2022    Procedure: UPPER BLEPHAROPLASTY, BILATERAL;  Surgeon: Jemal Sanchez MD;  Location: [facility] OR    CATARACT IOL, RT/LT Right 03/13/2018    CATARACT IOL, RT/LT Left 02/20/2018    COLONOSCOPY  12/24/2013     Procedure: COMBINED COLONOSCOPY, SINGLE BIOPSY/POLYPECTOMY BY BIOPSY;  COLONOSCOPY;  Surgeon: Dom Alvarez MD;  Location:  GI    COSMETIC BLEPHAROPLASTY LOWER LIDS BILATERAL Bilateral 5/6/2022    Procedure: BLEPHAROPLASTY, LOWER EYELID, BILATERAL, COSMETIC;  Surgeon: Jemal Sanchez MD;  [place] [facility] [place]    ESOPHAGOSCOPY, GASTROSCOPY, DUODENOSCOPY (EGD), COMBINED N/A 11/23/2016    Procedure: COMBINED ESOPHAGOSCOPY, GASTROSCOPY, DUODENOSCOPY (EGD);  Surgeon: Quinten Feliciano MD;  Location:  GI    EXTERNAL EAR SURGERY      right    EYE SURGERY      radial keratomy    FUSION, SPINE, INTERBODY, OBLIQUE ANTERIOR AND LUMBAR, 2 LEVELS, POST APPROACH, USING OTS N/A 11/18/2021    Procedure: Part 1: Oblique Anterior Interbody Fusion at Lumbar 5 to sacral 1 with use of Bone Morphogenic Protein,;  Surgeon: Serge Ramirez MD;  [facility]    GRAFT BONE FROM ILIAC CREST  02/14/2013    Procedure: GRAFT BONE FROM ILIAC CREST;  mac with block and local infilitration;  Surgeon: Av Mendez MD;  [facility]    HC BREATH HYDROGEN TEST N/A 10/14/2016    Procedure: HYDROGEN BREATH TEST;  Surgeon: Cheri Barron MD;  Location:  GI    HERNIA REPAIR      umbilical age 18 mos.    HYSTERECTOMY TOTAL ABDOMINAL  05/03/2000    INJECT EPIDURAL LUMBAR N/A 5/23/2023    Procedure: L3-4 interlaminar epidural steroid injection with fluoroscopy ( left> right);  Surgeon: Natasha Umaña MD;  Location: [facility] [place]    INJECT JOINT SACROILIAC Left 4/27/2023    Procedure: Left sacroiliac joint steroid injection with fluoroscopy;  Surgeon: Natasha Umaña MD;  Location: [facility] [place]    MASTECTOMY MODIFIED RADICAL Bilateral     bilateral; right breast prophylactic    OPTICAL TRACKING SYSTEM FUSION POSTERIOR SPINE LUMBAR N/A 11/18/2021    Procedure: Open Posterior Instrumented Spinal Fusion at Lumbar 5 to Sacral 1, with grimm aguayo osteotomy, use of O-Arm/Stealth;  Surgeon: Serge Ramirez MD;   [facility]    PARATHYROIDECTOMY  09/23/2004    R SUPERIOR    PARATHYROIDECTOMY  09/23/2004    parathyroid resection, subtotal    RELEASE CARPAL TUNNEL Right 12/2/2021    Procedure: RIGHT CARPAL TUNNEL RELEASE, RIGHT WRIST HARDWARE REMOVAL, RIGHT CARPAL BOSS EXCISION;  Surgeon: Rolan Conley MD;  Location: [facility] OR    REMOVE HARDWARE HAND  09/24/2013    Procedure: REMOVE HARDWARE HAND;  Left Hand Screw Removal       RHINOPLASTY  1968    thyr proc skin closed cosmetic manner by subcuticular stitch  01/23/2009    THYROPLASTY  10/09/2009    TONSILLECTOMY  1977    VITRECTOMY PARSPLANA WITH 25 GAUGE SYSTEM Left 6/20/2022    Procedure: LEFT EYE VITRECTOMY, PARS PLANA APPROACH, USING 25-GAUGE INSTRUMENTS, laser;  Surgeon: Felix Carlos MD;  Location: [facility] OR    WRIST SURGERY      wrist arthrodesis          Family History:  Family History   Problem Relation Age of Onset    Neurologic Disorder Mother         Anuerysm of Cerebral Artery, Dementia    Diabetes Mother     Thyroid Disease Mother         ,    Cerebrovascular Disease Mother     Dementia Mother     Osteoporosis Mother     Heart Disease Father         AAA    Hypertension Father     Circulatory Brother         Perihperal Neurophathy    Diabetes Maternal Grandmother     Asthma Maternal Grandmother     Chronic Obstructive Pulmonary Disease Maternal Grandfather         father    Asthma Maternal Grandfather     Diabetes Maternal Aunt         x2    Melanoma Maternal Aunt     Glaucoma Maternal Aunt     Breast Cancer Cousin     Dementia Other     Cancer Other         malignant melanoma    Hypertension Other     Hypertension Other     Cerebrovascular Disease Other     Cerebrovascular Disease Other     Obesity Other     Respiratory Other     Cancer Other     Diabetes Other     Asthma Other     Macular Degeneration No family hx of     Coronary Artery Disease No family hx of     Hyperlipidemia No family hx of     Kidney Disease No family hx of     Thrombosis No  family hx of     Arthritis No family hx of     Depression No family hx of     Mental Illness No family hx of     Substance Abuse No family hx of     Cystic Fibrosis No family hx of     Early Death No family hx of     Coronary Artery Disease Early Onset No family hx of     Heart Failure No family hx of     Bleeding Diathesis No family hx of     Ovarian Cancer No family hx of     Uterine Cancer No family hx of     Prostate Cancer No family hx of     Colorectal Cancer No family hx of     Pancreatic Cancer No family hx of     Lung Cancer No family hx of     Other Cancer No family hx of     Autoimmune Disease No family hx of     Unknown/Adopted No family hx of     Genetic Disorder No family hx of     Bleeding Disorder No family hx of     Clotting Disorder No family hx of     Anesthesia Reaction No family hx of      Social History:  Social History     Socioeconomic History    Marital status:      Spouse name: Not on file    Number of children: Not on file    Years of education: Not on file    Highest education level: Not on file   Occupational History    Not on file   Tobacco Use    Smoking status: Never     Passive exposure: Never    Smokeless tobacco: Never   Substance and Sexual Activity    Alcohol use: Not Currently     Alcohol/week: 7.0 standard drinks of alcohol     Types: 7 Glasses of wine per week     Comment: Rare to occasional    Drug use: Yes     Types: Marijuana    Sexual activity: Not Currently     Partners: Male     Birth control/protection: Abstinence   Other Topics Concern     Service No    Blood Transfusions Not Asked    Caffeine Concern No    Occupational Exposure No    Hobby Hazards No    Sleep Concern No    Stress Concern No    Weight Concern No    Special Diet No    Back Care No    Exercise Yes     Comment: walks 4-6x  week for 20-30 min. each    Bike Helmet Not Asked    Seat Belt Yes    Self-Exams Yes    Parent/sibling w/ CABG, MI or angioplasty before 65F 55M? No   Social History  Narrative    .  Recently retired. She has retired from [employer] and hopes to focus on a home care program, [employer],  for the elderly in the future.        She lives with a renter.         She exercises 50 minutes three times a week.     Social Determinants of Health     Financial Resource Strain: Not on file   Food Insecurity: Not on file   Transportation Needs: Not on file   Physical Activity: Not on file   Stress: Not on file   Social Connections: Not on file   Intimate Partner Violence: Not At Risk (4/14/2023)    Humiliation, Afraid, Rape, and Kick questionnaire     Fear of Current or Ex-Partner: No     Emotionally Abused: No     Physically Abused: No     Sexually Abused: No   Housing Stability: Not on file     Social History     Social History Narrative    .  Recently retired. She has retired from [employer] and hopes to focus on a home care program, [employer],  for the elderly in the future.        She lives with a renter.         She exercises 50 minutes three times a week.          Allergies:  Allergies   Allergen Reactions    Erythromycin Nausea    Penicillins Hives     Around age 6 - doesn't recall full reaction, mother told her it was hives.     Has tolerated cephalsporins.        Current Medications:   Current Outpatient Medications   Medication Sig Dispense Refill    acetaminophen (TYLENOL) 500 MG tablet Take 500-1,000 mg by mouth every 6 hours as needed for mild pain      amLODIPine (NORVASC) 10 MG tablet Take 1 tablet (10 mg) by mouth daily 90 tablet 3    Ascorbic Acid (VITAMIN C) 500 MG CHEW Take 1 tablet by mouth daily      atorvastatin (LIPITOR) 80 MG tablet Take 1 tablet (80 mg) by mouth daily 90 tablet 3    biotin 1000 MCG TABS tablet Take 1,000 mcg by mouth daily      Cholecalciferol (VITAMIN D3) 50 MCG (2000 UT) CAPS TAKE 3 CAPSULES (6,000 UNITS) BY MOUTH DAILY. 270 capsule 3    CRANBERRY EXTRACT PO Take 650 mg by mouth daily ~10 am  Takes 1      diclofenac (VOLTAREN) 1 % topical  gel Apply to toe or other joints painful 100 g 3    gabapentin (NEURONTIN) 300 MG capsule Take 4 capsules (1,200 mg) by mouth 3 times daily 1080 capsule 3    HEMP OIL OR EXTRACT OR OTHER CBD CANNABINOID, NOT MEDICAL CANNABIS, THC      HYDROcodone-acetaminophen (NORCO) 5-325 MG tablet Take 1 tab for breakthrough pain 1-2 times a week. 30 tablet 0    hydrOXYzine (VISTARIL) 25 MG capsule TAKE 1 CAPSULE BY MOUTH AT BEDTIME. 90 capsule 2    levothyroxine (SYNTHROID/LEVOTHROID) 112 MCG tablet TAKE 1/2 TABLET BY MOUTH DAILY 45 tablet 3    lisinopril (ZESTRIL) 40 MG tablet Take 1 tablet (40 mg) by mouth daily 90 tablet 3    magnesium 250 MG tablet Take 1 tablet by mouth daily      melatonin 5 MG tablet Take 10 mg by mouth At Bedtime       methocarbamol (ROBAXIN) 500 MG tablet Take 1 tablet (500 mg) by mouth 3 times daily 40 tablet 4    metoprolol succinate ER (TOPROL XL) 50 MG 24 hr tablet Take 1 tablet (50 mg) by mouth daily 90 tablet 3    Multiple Vitamin (MULTI-VITAMIN) per tablet Take 1 tablet by mouth daily      naloxone (NARCAN) 4 MG/0.1ML nasal spray Spray 1 spray (4 mg) into one nostril alternating nostrils once as needed for opioid reversal every 2-3 minutes until assistance arrives 0.2 mL 0    ondansetron (ZOFRAN-ODT) 4 MG ODT tab Take 1 tablet (4 mg) by mouth every 12 hours as needed for nausea 180 tablet 3    spironolactone (ALDACTONE) 25 MG tablet Take 2 tablets (50 mg) by mouth daily 180 tablet 3    tiZANidine (ZANAFLEX) 4 MG tablet Take 0.5-1 tablets (2-4 mg) by mouth 3 times daily 60 tablet 0    Vaginal Lubricant (REPHRESH) GEL Place 2 g vaginally every 3 days 2 g 3        Physical Exam:     There were no vitals taken for this visit.    General Appearance: No distress, seated comfortably  Mood: Euthymic  HE ENT: Non constricted pupils  Respiratory: Non labored breathing        ASSESSMENT AND PLAN:     Encounter Diagnosis:  Lumbar stenosis and neurogenic claudication  Lumbosacral spondylosis  L5/S1  fusion  Spinal stenosis  Peripheral neuropathy       Nadira Perez is a 73 year old year old female  who presents to the pain clinic with low back and left leg pain, s/p L3-4 GREGOR    RECOMMENDATIONS:     1. Medications: no changes. She is using medical marijuana.    2. Procedure: We are scheduling the patient for caudal epidural steroid injection with fluoroscopy in the procedure suite. Risks/benefits/alternatives were discussed.     3. Education: continue PT exercises.     Follow up: 3 months.             Answers submitted by the patient for this visit:  DIANNE-7 (Submitted on 8/7/2023)  DIANNE 7 TOTAL SCORE: 2  Symptoms you have experienced in the last 30 days (Submitted on 8/7/2023)  General Symptoms: No  Skin Symptoms: No  HENT Symptoms: Yes  EYE SYMPTOMS: Yes  HEART SYMPTOMS: Yes  LUNG SYMPTOMS: Yes  INTESTINAL SYMPTOMS: No  URINARY SYMPTOMS: Yes  GYNECOLOGIC SYMPTOMS: Yes  BREAST SYMPTOMS: No  SKELETAL SYMPTOMS: Yes  BLOOD SYMPTOMS: No  NERVOUS SYSTEM SYMPTOMS: Yes  MENTAL HEALTH SYMPTOMS: Yes  Please answer the questions below to tell us what conditions you are experiencing: (Submitted on 8/7/2023)  Congestion: No  Trouble swallowing: Yes   Voice hoarseness: No  Mouth sores: Yes  Tooth pain: No  Gum tenderness: No  Bleeding gums: Yes  Change in taste: No  Change in sense of smell: No  Dry mouth: Yes  Hearing aid used: Yes  Neck lump: No  Please answer the questions below to tell us what conditions you are experiencing: (Submitted on 8/7/2023)  Vision loss: No  Dry eyes: Yes  Watery eyes: No  Eye bulging: No  Flashing of lights: No  Spots: No  Floaters: Yes  Crossed eyes: No  Tunnel Vision: No  Yellowing of eyes: No  Eye irritation: No  Please answer the questions below to tell us what condition you are experiencing: (Submitted on 8/7/2023)  Pain in legs with walking: Yes  Fingers or toes appear blue: No  High blood pressure: Yes  Low blood pressure: No  Fainting: No  Murmurs: Yes  Pacemaker: No  Varicose veins:  Yes  Edema or swelling: Yes  Wake up at night with shortness of breath: No  Light-headedness: No  Exercise intolerance: No  Please answer the questions below to tell us what condition you are experiencing: (Submitted on 8/7/2023)  Snoring: No  Difficulty breathing on exertion: No  Nighttime Cough: No  Difficulty breathing when lying flat: No  Please answer the questions below to tell us what condition you are experiencing: (Submitted on 8/7/2023)  Trouble holding urine or incontinence: Yes  Increased frequency of urination: Yes  Decreased frequency of urination: No  Frequent nighttime urination: Yes  Difficulty emptying bladder: No  Please answer the questions below to tell us what condition you are experiencing: (Submitted on 8/7/2023)  Bleeding or spotting between periods: No  Heavy or painful periods: No  Irregular periods: No  Vaginal discharge: No  Hot flashes: No  Vaginal dryness: Yes  Genital ulcers: No  Reduced libido: Yes  Painful intercourse: Yes  Difficulty with sexual arousal: Yes  Post-menopausal bleeding: No  Please answer the questions below to tell us what condition you are experiencing: (Submitted on 8/7/2023)  Swollen joints: Yes  Joint pain: Yes  Bone pain: Yes  Muscle cramps: Yes  Muscle weakness: Yes  Joint stiffness: Yes  Bone fracture: Yes  Please answer the questions below to tell us what condition you are experiencing: (Submitted on 8/7/2023)  Trouble with coordination: Yes  Difficulty walking: Yes  Paralysis: No  Numbness: Yes  Please answer the questions below to tell us what condition you are experiencing: (Submitted on 8/7/2023)  Trouble thinking or concentrating: No  Mood changes: No  Panic attacks: No

## 2023-08-07 NOTE — PATIENT INSTRUCTIONS
Procedures:    Call to schedule your procedure: [phone] option #2  Caudal epidural steroid injection     Your pre-procedure instructions are below, please call our clinic if you have any questions.      Recommended Follow up:      Follow up in 2-3 months.       To speak with a nurse, schedule/reschedule/cancel a clinic appointment, or request a medication refill call: [phone].    You can also reach us by [employer]Minder: [email or web address]      Procedure Information related to COVID-19     Please call [phone] option #2 to schedule, reschedule, or cancel your procedure appointment.   Phones are answered Monday - Friday from 08:00 - 4:30pm.  Leave a voicemail with your name, birth date, and phone number if no one is available to take your call.        You no longer need to test for COVID- 19 prior to your procedure/surgery, unless your physician specifically requests that you test. If you experience COVID symptoms or have tested positive for COVID-19 within 14 days of your scheduled surgery or procedure, please update our office right away and your procedure may have to be postponed.       The procedure center staff will call you several days before the procedure to review important information that you will need to know for the day of the procedure.     Please contact the clinic if you have further questions about this information [phone].        Information related to Scheduling and Pre-Procedure Instructions:    If you must reschedule your procedure more than two times, you must follow up in clinic before rescheduling again.    Preparing for your procedure    CAUTION - FAILURE TO FOLLOW THESE PRE-PROCEDURE INSTRUCTIONS WILL RESULT IN YOUR PROCEDURE BEING RESCHEDULED.    Your Procedure: Caudal epidural steroid injection      Pregnancy  If you are pregnant, or think you may be pregnant, please notify our staff. This may or may not affect the ability to perform the procedure.        You must have a  take you home after your procedure. Transportation by taxi or para-transit is okay as long as you have a responsible adult accompany you. You must provide your 's full name and contact number at time of check in.     Fasting Protocol Please have nothing to eat or drink 1 hour prior to arrival.     Medications If you take any medications, DO NOT STOP. Take your medications as usual the day of your procedure with a sip of water AT LEAST 2 HOURS PRIOR TO ARRIVAL.    Antibiotics If you are currently taking antibiotics, you must complete the entire dose 7 days prior to your scheduled procedure. You must be clear of any signs or symptoms of infection. If you begin antibiotics, please contact our clinic for instructions.     Fever, Chills, or Rash If you experience a fever of higher than 100 degrees, chills, rash, or open wounds during the one week before your procedure, please call the clinic to see if you may proceed with your procedure.      Medication Hold List  **Patients under Cardiology/Neurology care should consult their provider prior to the pain procedure to verify pre-procedure medication instructions. The information below contains general guidelines.**        Blood Thinners If you are taking daily ASPIRIN, PLAVIX, OR OTHER BLOOD THINNERS SUCH AS COUMADIN/WARFARIN, we will need your prescribing doctor to sign a release permitting you to stop these medications. Once approved by your prescribing doctor - STOP ALL BLOOD THINNERS BASED ON THE TIME TABLE BELOW PRIOR TO YOUR PROCEDURE. If you have been instructed to stop WARFARIN(COUMADIN), you must have an INR lab drawn the day before your procedure. Your INR must be within normal limits before we can perform your injection. MEDICATIONS CAN BE RESTARTED AFTER YOUR PROCEDURE.    24 HOUR HOLD  Lovenox (enoxaparin)  Agrylin (Anagrelide)    3 DAY HOLD  Xarelto (rivaroxaban)    5 DAY HOLD  Coumadin (Warfarin)  Brilinta (ticagrelor) 7 DAY  HOLD  Anacin, Bufferin, Ecotrin, Excedrin, Aggrenox (Aspirin)  Pradexa (Dabigatran)  Elmiron (Pentosan)  Plavix (Clopidogrel Bisulfate)  Pletal (Cilostazol)    10 DAY HOLD  Effient (Prasugel)    14 DAY HOLD  Ticlid (ticlopidine)        Non-steroidal Anti-inflammatories (NSAIDs) DO NOT TAKE any non-steroidal anti-inflammatory medications (NSAIDs) listed on the table below. MEDICATIONS CAN BE RESTARTED AFTER YOUR PROCEDURE. Celebrex is OK to take and does not need to be discontinued.     Medications to stop:  1 DAY HOLD  Advil, Motrin (Ibuprofen)  Voltaren (Diclofenac)  Toradol (Ketorolac)    3 DAY HOLD  Arthrotec (diciofenac sodium/misoprostol)  Clinoril (Sulindac)  Indocin (Indomethacin)  Lodine (Etodolac)  Vicoprofen (Hydrocodone and Ibuprofen)  Apixaban (Eliquis)    4 DAY HOLD  Mobic (Meloxicam)  Naprosyn (Naproxen)   7 DAY HOLD  Aleve (Naproxen sodium)  Darvon compound (contains aspirin)  Norgesic Forte (contains aspirin)  Oruvall (Ketoprofen)  Percodan (contains aspirin)  Relafen (Nabumetone)  Salsalate  Trilisate  Vitamin E (more than 400 mg per day)  Any medication containing aspirin    14 DAY HOLD  Daypro (Oxaprozin)  Feldene (Piroxicam)            To speak with a nurse, schedule/reschedule/cancel a clinic appointment, or request a medication refill call: [phone]    You can also reach us by Access Mobile: [email or web address]

## 2023-08-07 NOTE — TELEPHONE ENCOUNTER
RN reviewed patient chart. Pre procedure instructions were reviewed with patient.    Lyric Johns RNCC

## 2023-08-12 DIAGNOSIS — I10 ESSENTIAL HYPERTENSION, BENIGN: ICD-10-CM

## 2023-08-12 DIAGNOSIS — S93.512A SPRAIN OF INTERPHALANGEAL JOINT OF LEFT GREAT TOE, INITIAL ENCOUNTER: ICD-10-CM

## 2023-08-12 DIAGNOSIS — M79.2 NEUROPATHIC PAIN: ICD-10-CM

## 2023-08-14 NOTE — TELEPHONE ENCOUNTER
Prescription refill requested for:   tiZANidine (ZANAFLEX) 4 MG tablet       Last Written Prescription Date:  7/20/23  Last Fill Quantity: 60,   # refills: 0  Last Office Visit: 7/20/23  Future Office visit:           Jessenia Ramos ATC

## 2023-08-16 ENCOUNTER — MYC MEDICAL ADVICE (OUTPATIENT)
Dept: ANESTHESIOLOGY | Facility: CLINIC | Age: 73
End: 2023-08-16
Payer: COMMERCIAL

## 2023-08-16 RX ORDER — SPIRONOLACTONE 25 MG/1
50 TABLET ORAL DAILY
Qty: 180 TABLET | Refills: 3 | OUTPATIENT
Start: 2023-08-16

## 2023-08-17 ENCOUNTER — MYC MEDICAL ADVICE (OUTPATIENT)
Dept: INTERNAL MEDICINE | Facility: CLINIC | Age: 73
End: 2023-08-17
Payer: COMMERCIAL

## 2023-08-17 DIAGNOSIS — I10 ESSENTIAL HYPERTENSION, BENIGN: ICD-10-CM

## 2023-08-17 RX ORDER — GABAPENTIN 300 MG/1
1200 CAPSULE ORAL 3 TIMES DAILY
Refills: 3 | OUTPATIENT
Start: 2023-08-17

## 2023-08-17 RX ORDER — SPIRONOLACTONE 25 MG/1
50 TABLET ORAL DAILY
Qty: 180 TABLET | Refills: 3 | Status: CANCELLED | OUTPATIENT
Start: 2023-08-17

## 2023-08-17 NOTE — TELEPHONE ENCOUNTER
RX available: request to soon  spironolactone (ALDACTONE) 25 MG tablet   180 tablet 3 10/27/2022  No  Sig - Route: Take 2 tablets (50 mg) by mouth daily - Oral

## 2023-08-18 ENCOUNTER — ONCOLOGY VISIT (OUTPATIENT)
Dept: ONCOLOGY | Facility: CLINIC | Age: 73
End: 2023-08-18
Attending: STUDENT IN AN ORGANIZED HEALTH CARE EDUCATION/TRAINING PROGRAM
Payer: COMMERCIAL

## 2023-08-18 VITALS
BODY MASS INDEX: 31.79 KG/M2 | TEMPERATURE: 99.3 F | OXYGEN SATURATION: 97 % | SYSTOLIC BLOOD PRESSURE: 130 MMHG | HEART RATE: 87 BPM | WEIGHT: 194 LBS | DIASTOLIC BLOOD PRESSURE: 81 MMHG

## 2023-08-18 DIAGNOSIS — I89.0 LYMPHEDEMA: ICD-10-CM

## 2023-08-18 DIAGNOSIS — M21.371 RIGHT FOOT DROP: ICD-10-CM

## 2023-08-18 DIAGNOSIS — M79.18 MYOFASCIAL PAIN: Primary | ICD-10-CM

## 2023-08-18 DIAGNOSIS — Z17.0 MALIGNANT NEOPLASM OF BREAST IN FEMALE, ESTROGEN RECEPTOR POSITIVE, UNSPECIFIED LATERALITY, UNSPECIFIED SITE OF BREAST (H): Primary | ICD-10-CM

## 2023-08-18 DIAGNOSIS — C50.919 MALIGNANT NEOPLASM OF BREAST IN FEMALE, ESTROGEN RECEPTOR POSITIVE, UNSPECIFIED LATERALITY, UNSPECIFIED SITE OF BREAST (H): Primary | ICD-10-CM

## 2023-08-18 DIAGNOSIS — G62.9 PERIPHERAL POLYNEUROPATHY: ICD-10-CM

## 2023-08-18 PROCEDURE — G0463 HOSPITAL OUTPT CLINIC VISIT: HCPCS | Performed by: STUDENT IN AN ORGANIZED HEALTH CARE EDUCATION/TRAINING PROGRAM

## 2023-08-18 PROCEDURE — 99215 OFFICE O/P EST HI 40 MIN: CPT | Performed by: STUDENT IN AN ORGANIZED HEALTH CARE EDUCATION/TRAINING PROGRAM

## 2023-08-18 RX ORDER — SPIRONOLACTONE 25 MG/1
50 TABLET ORAL DAILY
Qty: 180 TABLET | Refills: 1 | Status: SHIPPED | OUTPATIENT
Start: 2023-08-18 | End: 2023-11-30

## 2023-08-18 ASSESSMENT — PAIN SCALES - GENERAL: PAINLEVEL: SEVERE PAIN (6)

## 2023-08-18 NOTE — LETTER
8/18/2023         RE: Nadira Perez  [address]  [facility] [address]        Dear Colleague,    Thank you for referring your patient, Nadira Perez, to the [facility]. Please see a copy of my visit note below.    [facility]   PM&R clinic note        Interval history:     Nadira Perez presents to clinic today for follow up reg her rehab needs.   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  Recommendations included:  Therapy/equipment/braces:  Continue  wearing right AFO for right foot drop to help with safety when transferring and ambulating.  Continue home exercise program including home lymphedema management regimen.  Continue working with lymphedema therapy.  Continue using cane and walker for assist with transfers and ambulation.  Medications:  Continue gabapentin at current dosing.  Interventions:  Referral placed to pain management team as patient would like to consider RFA for her lumbar symptoms.        Symptoms,  Ismael was seen for a return visit this morning.  Overall, in terms of her rehabilitation needs that she has been very stable and doing better.  She obtained her right AFO after her last visit for the right foot drop, and she states that this has been helping significantly.  In addition though she notes that she thinks she has developed a right lower extremity lipoma about 5 cm or so below knee level, and is concerned about this and wants to know if she can be referred to a professional who can help evaluate this  And determine if any management is needed.  She uses her walker for all ambulation and transfers, which has worked well for safety and fall prevention.  She saw the pain management team, and received an epidural steroid injection which she states helped, but is considering an implantable stimulator, and was recently prescribed a  TENS unit to see if that helps with her symptoms.  She has continued Pilates twice weekly initially, then has been doing it once weekly more recently.  She also works on her stationary bike.  She continues to take her diuretic, though states that she forgot this morning.      Therapies/HEP,  Continues with regular exercise regimen with Pilates and bike.      Functionally,   Improvement in mobility with right AFO.      Social history is unchanged.      Medications:  Current Outpatient Medications   Medication Sig Dispense Refill    acetaminophen (TYLENOL) 500 MG tablet Take 500-1,000 mg by mouth every 6 hours as needed for mild pain      amLODIPine (NORVASC) 10 MG tablet Take 1 tablet (10 mg) by mouth daily 90 tablet 3    Ascorbic Acid (VITAMIN C) 500 MG CHEW Take 1 tablet by mouth daily      atorvastatin (LIPITOR) 80 MG tablet Take 1 tablet (80 mg) by mouth daily 90 tablet 3    biotin 1000 MCG TABS tablet Take 1,000 mcg by mouth daily      Cholecalciferol (VITAMIN D3) 50 MCG (2000 UT) CAPS TAKE 3 CAPSULES (6,000 UNITS) BY MOUTH DAILY. 270 capsule 3    CRANBERRY EXTRACT PO Take 650 mg by mouth daily ~10 am  Takes 1      diclofenac (VOLTAREN) 1 % topical gel Apply to toe or other joints painful 100 g 3    gabapentin (NEURONTIN) 300 MG capsule Take 4 capsules (1,200 mg) by mouth 3 times daily 1080 capsule 3    HEMP OIL OR EXTRACT OR OTHER CBD CANNABINOID, NOT MEDICAL CANNABIS, THC      HYDROcodone-acetaminophen (NORCO) 5-325 MG tablet Take 1 tab for breakthrough pain 1-2 times a week. 30 tablet 0    hydrOXYzine (VISTARIL) 25 MG capsule TAKE 1 CAPSULE BY MOUTH AT BEDTIME. 90 capsule 2    levothyroxine (SYNTHROID/LEVOTHROID) 112 MCG tablet TAKE 1/2 TABLET BY MOUTH DAILY 45 tablet 3    lisinopril (ZESTRIL) 40 MG tablet Take 1 tablet (40 mg) by mouth daily 90 tablet 3    magnesium 250 MG tablet Take 1 tablet by mouth daily      melatonin 5 MG tablet Take 10 mg by mouth At Bedtime       methocarbamol (ROBAXIN) 500 MG tablet  Take 1 tablet (500 mg) by mouth 3 times daily 40 tablet 4    metoprolol succinate ER (TOPROL XL) 50 MG 24 hr tablet Take 1 tablet (50 mg) by mouth daily 90 tablet 3    Multiple Vitamin (MULTI-VITAMIN) per tablet Take 1 tablet by mouth daily      naloxone (NARCAN) 4 MG/0.1ML nasal spray Spray 1 spray (4 mg) into one nostril alternating nostrils once as needed for opioid reversal every 2-3 minutes until assistance arrives 0.2 mL 0    ondansetron (ZOFRAN-ODT) 4 MG ODT tab Take 1 tablet (4 mg) by mouth every 12 hours as needed for nausea 180 tablet 3    spironolactone (ALDACTONE) 25 MG tablet Take 2 tablets (50 mg) by mouth daily 180 tablet 1    tiZANidine (ZANAFLEX) 4 MG tablet TAKE 0.5-1 TABLETS (2-4 MG) BY MOUTH 3 TIMES DAILY 60 tablet 0    Vaginal Lubricant (REPHRESH) GEL Place 2 g vaginally every 3 days 2 g 3              Physical Exam:   /81   Pulse 87   Temp 99.3  F (37.4  C) (Oral)   Wt 88 kg (194 lb)   SpO2 97%   BMI 31.79 kg/m    Gen: NAD, pleasant and cooperative, sitting in wheelchair  HEENT: Normocephalic, atraumatic, extra-ocular movements appear intact  Pulm: non-labored breathing in room air  Ext: mild edema in left leg to above knee level, no tenderness in calves.  Right lower extremity with  what feels like a lipoma over her right shin about 5 cm below knee level.  Nontender, no erythema or warmth  Neuro/MSK:   Orientation: Oriented to person, place, time, situation. Exhibits good insight into her condition and ongoing management/symptoms.  Motor:    Right ankle not tested with AFO today.    Labs/Imaging:  Lab Results   Component Value Date    WBC 7.8 10/27/2022    HGB 11.9 10/27/2022    HCT 37.3 10/27/2022    MCV 90 10/27/2022     10/27/2022     Lab Results   Component Value Date     01/11/2023    POTASSIUM 4.3 01/11/2023    CHLORIDE 105 01/11/2023    CO2 23 01/11/2023     (H) 01/11/2023     Lab Results   Component Value Date    GFRESTIMATED 83 07/20/2023    GFRESTBLACK  >90 05/07/2021    GFRESTBLACK >90 05/07/2021     Lab Results   Component Value Date    AST 22 10/27/2022    ALT 16 10/27/2022    ALKPHOS 96 10/27/2022    BILITOTAL 0.2 10/27/2022    BILICONJ 0.0 11/08/2012     Lab Results   Component Value Date    INR 0.85 (L) 08/20/2010     Lab Results   Component Value Date    BUN 20.7 01/11/2023    CR 0.76 07/20/2023              Assessment/Plan   Nadira Perez presents to clinic today for follow up reg her rehab needs.   She has h/o left stage IIIC inflammatory breast cancer (ER/VT+ve, HER2-ve)    Was last seen in clinic on 4/14/23.  As discussed with Ismael, overall she is doing better from a rehabilitation perspective.  She should continue to take her diuretic regularly as recommended by her PCP.  In addition, she should continue to wear her right AFO for stability and fall prevention with ambulation as well as using her walker for all ambulation's and transfers.  She should continue her home exercise regimen as tolerated, and continue to follow with the pain team for management of her back pain.  We will place a dermatology referral for her right lower extremity lipoma.  We will plan a return visit in about 6 to 8 months.  Ismael is in agreement with this plan.      Therapy/equipment/braces:  Continue regular home exercise regimen as tolerated.  Continue using right AFO.  Continue using walker for all transfers and ambulation.  Referral / follow up with other providers:  Continue to follow-up with pain management team.  Referral placed to dermatology for evaluation of right lower extremity lipoma  Follow up: 6 to 8 months      Carmen Centeno MD  Physical Medicine & Rehabilitation      50 minutes spent on the date of the encounter doing chart review, history and exam, documentation and further activities as noted above.

## 2023-08-18 NOTE — PATIENT INSTRUCTIONS
1.  Continue wearing your right AFO.  2.  A dermatology referral was placed for your right leg lipoma.  3.  Continue to use your walker for all ambulation and transfers for safety and fall prevention.  4.  Continue your home exercise regimen as tolerated.  5.  Continue to follow with pain management team.  6.  Follow-up with Dr. Centeno in 6 to 8 months.

## 2023-08-18 NOTE — PROGRESS NOTES
[facility]   PM&R clinic note        Interval history:     Nadira Perez presents to clinic today for follow up reg her rehab needs.   She has h/o left stage IIIC inflammatory breast cancer (ER/TX+ve, HER2-ve)    Was last seen in clinic on 4/14/23.  Recommendations included:  Therapy/equipment/braces:  Continue  wearing right AFO for right foot drop to help with safety when transferring and ambulating.  Continue home exercise program including home lymphedema management regimen.  Continue working with lymphedema therapy.  Continue using cane and walker for assist with transfers and ambulation.  Medications:  Continue gabapentin at current dosing.  Interventions:  Referral placed to pain management team as patient would like to consider RFA for her lumbar symptoms.        Symptoms,  Ismael was seen for a return visit this morning.  Overall, in terms of her rehabilitation needs that she has been very stable and doing better.  She obtained her right AFO after her last visit for the right foot drop, and she states that this has been helping significantly.  In addition though she notes that she thinks she has developed a right lower extremity lipoma about 5 cm or so below knee level, and is concerned about this and wants to know if she can be referred to a professional who can help evaluate this  And determine if any management is needed.  She uses her walker for all ambulation and transfers, which has worked well for safety and fall prevention.  She saw the pain management team, and received an epidural steroid injection which she states helped, but is considering an implantable stimulator, and was recently prescribed a TENS unit to see if that helps with her symptoms.  She has continued Pilates twice weekly initially, then has been doing it once weekly more recently.  She also works on her stationary bike.  She continues to take her diuretic, though states that she forgot this  morning.      Therapies/HEP,  Continues with regular exercise regimen with Pilates and bike.      Functionally,   Improvement in mobility with right AFO.      Social history is unchanged.      Medications:  Current Outpatient Medications   Medication Sig Dispense Refill    acetaminophen (TYLENOL) 500 MG tablet Take 500-1,000 mg by mouth every 6 hours as needed for mild pain      amLODIPine (NORVASC) 10 MG tablet Take 1 tablet (10 mg) by mouth daily 90 tablet 3    Ascorbic Acid (VITAMIN C) 500 MG CHEW Take 1 tablet by mouth daily      atorvastatin (LIPITOR) 80 MG tablet Take 1 tablet (80 mg) by mouth daily 90 tablet 3    biotin 1000 MCG TABS tablet Take 1,000 mcg by mouth daily      Cholecalciferol (VITAMIN D3) 50 MCG (2000 UT) CAPS TAKE 3 CAPSULES (6,000 UNITS) BY MOUTH DAILY. 270 capsule 3    CRANBERRY EXTRACT PO Take 650 mg by mouth daily ~10 am  Takes 1      diclofenac (VOLTAREN) 1 % topical gel Apply to toe or other joints painful 100 g 3    gabapentin (NEURONTIN) 300 MG capsule Take 4 capsules (1,200 mg) by mouth 3 times daily 1080 capsule 3    HEMP OIL OR EXTRACT OR OTHER CBD CANNABINOID, NOT MEDICAL CANNABIS, THC      HYDROcodone-acetaminophen (NORCO) 5-325 MG tablet Take 1 tab for breakthrough pain 1-2 times a week. 30 tablet 0    hydrOXYzine (VISTARIL) 25 MG capsule TAKE 1 CAPSULE BY MOUTH AT BEDTIME. 90 capsule 2    levothyroxine (SYNTHROID/LEVOTHROID) 112 MCG tablet TAKE 1/2 TABLET BY MOUTH DAILY 45 tablet 3    lisinopril (ZESTRIL) 40 MG tablet Take 1 tablet (40 mg) by mouth daily 90 tablet 3    magnesium 250 MG tablet Take 1 tablet by mouth daily      melatonin 5 MG tablet Take 10 mg by mouth At Bedtime       methocarbamol (ROBAXIN) 500 MG tablet Take 1 tablet (500 mg) by mouth 3 times daily 40 tablet 4    metoprolol succinate ER (TOPROL XL) 50 MG 24 hr tablet Take 1 tablet (50 mg) by mouth daily 90 tablet 3    Multiple Vitamin (MULTI-VITAMIN) per tablet Take 1 tablet by mouth daily      naloxone  (NARCAN) 4 MG/0.1ML nasal spray Spray 1 spray (4 mg) into one nostril alternating nostrils once as needed for opioid reversal every 2-3 minutes until assistance arrives 0.2 mL 0    ondansetron (ZOFRAN-ODT) 4 MG ODT tab Take 1 tablet (4 mg) by mouth every 12 hours as needed for nausea 180 tablet 3    spironolactone (ALDACTONE) 25 MG tablet Take 2 tablets (50 mg) by mouth daily 180 tablet 1    tiZANidine (ZANAFLEX) 4 MG tablet TAKE 0.5-1 TABLETS (2-4 MG) BY MOUTH 3 TIMES DAILY 60 tablet 0    Vaginal Lubricant (REPHRESH) GEL Place 2 g vaginally every 3 days 2 g 3              Physical Exam:   /81   Pulse 87   Temp 99.3  F (37.4  C) (Oral)   Wt 88 kg (194 lb)   SpO2 97%   BMI 31.79 kg/m    Gen: NAD, pleasant and cooperative, sitting in wheelchair  HEENT: Normocephalic, atraumatic, extra-ocular movements appear intact  Pulm: non-labored breathing in room air  Ext: mild edema in left leg to above knee level, no tenderness in calves.  Right lower extremity with  what feels like a lipoma over her right shin about 5 cm below knee level.  Nontender, no erythema or warmth  Neuro/MSK:   Orientation: Oriented to person, place, time, situation. Exhibits good insight into her condition and ongoing management/symptoms.  Motor:    Right ankle not tested with AFO today.    Labs/Imaging:  Lab Results   Component Value Date    WBC 7.8 10/27/2022    HGB 11.9 10/27/2022    HCT 37.3 10/27/2022    MCV 90 10/27/2022     10/27/2022     Lab Results   Component Value Date     01/11/2023    POTASSIUM 4.3 01/11/2023    CHLORIDE 105 01/11/2023    CO2 23 01/11/2023     (H) 01/11/2023     Lab Results   Component Value Date    GFRESTIMATED 83 07/20/2023    GFRESTBLACK >90 05/07/2021    GFRESTBLACK >90 05/07/2021     Lab Results   Component Value Date    AST 22 10/27/2022    ALT 16 10/27/2022    ALKPHOS 96 10/27/2022    BILITOTAL 0.2 10/27/2022    BILICONJ 0.0 11/08/2012     Lab Results   Component Value Date    INR  0.85 (L) 08/20/2010     Lab Results   Component Value Date    BUN 20.7 01/11/2023    CR 0.76 07/20/2023              Assessment/Plan   Nadira Perez presents to clinic today for follow up reg her rehab needs.   She has h/o left stage IIIC inflammatory breast cancer (ER/MD+ve, HER2-ve)    Was last seen in clinic on 4/14/23.  As discussed with Ismael, overall she is doing better from a rehabilitation perspective.  She should continue to take her diuretic regularly as recommended by her PCP.  In addition, she should continue to wear her right AFO for stability and fall prevention with ambulation as well as using her walker for all ambulation's and transfers.  She should continue her home exercise regimen as tolerated, and continue to follow with the pain team for management of her back pain.  We will place a dermatology referral for her right lower extremity lipoma.  We will plan a return visit in about 6 to 8 months.  Ismael is in agreement with this plan.      Therapy/equipment/braces:  Continue regular home exercise regimen as tolerated.  Continue using right AFO.  Continue using walker for all transfers and ambulation.  Referral / follow up with other providers:  Continue to follow-up with pain management team.  Referral placed to dermatology for evaluation of right lower extremity lipoma  Follow up: 6 to 8 months      Carmen Centeno MD  Physical Medicine & Rehabilitation      50 minutes spent on the date of the encounter doing chart review, history and exam, documentation and further activities as noted above.

## 2023-09-01 DIAGNOSIS — I10 BENIGN ESSENTIAL HYPERTENSION: Primary | ICD-10-CM

## 2023-09-05 NOTE — ADDENDUM NOTE
Encounter addended by: Yamilka Ferrer, OT on: 9/5/2023 3:09 PM   Actions taken: Episode resolved, Problem List modified

## 2023-09-06 RX ORDER — METOPROLOL SUCCINATE 50 MG/1
50 TABLET, EXTENDED RELEASE ORAL DAILY
Qty: 90 TABLET | Refills: 1 | Status: SHIPPED | OUTPATIENT
Start: 2023-09-06 | End: 2023-11-30

## 2023-09-07 DIAGNOSIS — S93.512A SPRAIN OF INTERPHALANGEAL JOINT OF LEFT GREAT TOE, INITIAL ENCOUNTER: ICD-10-CM

## 2023-09-11 ENCOUNTER — ANCILLARY PROCEDURE (OUTPATIENT)
Dept: GENERAL RADIOLOGY | Facility: CLINIC | Age: 73
End: 2023-09-11
Attending: FAMILY MEDICINE
Payer: COMMERCIAL

## 2023-09-11 ENCOUNTER — OFFICE VISIT (OUTPATIENT)
Dept: ORTHOPEDICS | Facility: CLINIC | Age: 73
End: 2023-09-11
Payer: COMMERCIAL

## 2023-09-11 DIAGNOSIS — S99.921A RIGHT FOOT INJURY: Primary | ICD-10-CM

## 2023-09-11 DIAGNOSIS — M79.675 PAIN OF TOE OF LEFT FOOT: Primary | ICD-10-CM

## 2023-09-11 PROCEDURE — 99214 OFFICE O/P EST MOD 30 MIN: CPT | Performed by: FAMILY MEDICINE

## 2023-09-11 PROCEDURE — 73660 X-RAY EXAM OF TOE(S): CPT | Mod: RT | Performed by: RADIOLOGY

## 2023-09-11 NOTE — LETTER
9/11/2023      RE: Nadira Perez  [address]  [facility] [address]     Dear Colleague,    Thank you for referring your patient, Nadira Perez, to the [facility]IC[facility]. Please see a copy of my visit note below.      [facility] AND SURGERY CENTER  SPORTS & ORTHOPEDIC CLINIC VISIT     Sep 11, 2023        ASSESSMENT & PLAN    73-year-old with suspected nondisplaced fracture of the second toe.    Reviewed imaging and assessment with patient in detail  Discussed treatment with activity modifications, dorian taping, postop shoe.  At the current time the patient like to pursue dorian taping and modify footwear.  Discussed this could take 4 to 6 weeks to completely heal.  She will follow-up after that time if she is continuing to have significant pain.    Bob Arredondo MD  [facility] SP[facility]TS [facility]    -----  Chief Complaint   Patient presents with    Right Foot - Pain       SUBJECTIVE  Nadira Perez is a/an 73 year old female who is seen as a self referral for evaluation of  right second toe injury..     The patient is seen by themselves.  The patient is Right handed    Onset: 9/9/23. Patient describes injury as tripping over the carpet and falling her toe.   Location of Pain: right 2nd toe   Worsened by: Walking, pressure   Better with: Rest   Treatments tried: rest/activity avoidance, elevation, and ice  Associated symptoms: swelling and numbness     Orthopedic/Surgical history: NO  Social History/Occupation: Retired      REVIEW OF SYSTEMS:  Do you have fever, chills, weight loss? No  Do you have any vision problems? No  Do you have any chest pain or edema? No  Do you have any shortness of breath or wheezing?  No  Do you have stomach problems? No  Do you have any numbness or focal weakness? No  Do you have diabetes? No  Do you have problems with bleeding or clotting? No  Do you have an rashes or other skin lesions?  No    OBJECTIVE:  There were no vitals taken for this visit.     General  - alert, pleasant, no distress  CV  - normal radial pulse, cap refill brisk  Musculoskeletal -right second toe  - inspection: no atrophy, normal joint alignment, swelling and ecchymosis of the second toe  - palpation: Tender over the middle and proximal phalanx of the second toe as well as over the dorsal aspect of the PIP joint.  - ROM:  MTP 30 deg flexion   80 deg extension   PIP 50 deg flexion   50 deg extension with pain   DIP 50 deg flexion   50 deg extension  - strength: 5/5 toe flexion, 5/5 toe extension, 5/5 dorsiflexion, 5/5 plantar flexion  - special tests:  None  Neuro  - no numbness, no motor deficit, grossly normal coordination, normal muscle tone  Skin  - no  erythema, warmth, or induration, no obvious rash            RADIOLOGY:    Three-view x-rays of right second toe performed and reviewed independently which demonstrates irregularity at the PIP which given patient's current symptoms likely represents a small nondisplaced fracture.  See EMR for formal radiology report.               Again, thank you for allowing me to participate in the care of your patient.      Sincerely,    Bob Arredondo MD

## 2023-09-11 NOTE — PROGRESS NOTES
[facility] CLINICS AND SURGERY CENTER  SPORTS & ORTHOPEDIC CLINIC VISIT     Sep 11, 2023        ASSESSMENT & PLAN    73-year-old with suspected nondisplaced fracture of the second toe.    Reviewed imaging and assessment with patient in detail  Discussed treatment with activity modifications, dorian taping, postop shoe.  At the current time the patient like to pursue dorian taping and modify footwear.  Discussed this could take 4 to 6 weeks to completely heal.  She will follow-up after that time if she is continuing to have significant pain.    Bob Arredondo MD  [facility] SPORTS MEDICINE CLINIC [place]    -----  Chief Complaint   Patient presents with     Right Foot - Pain       SUBJECTIVE  Nadira Perez is a/an 73 year old female who is seen as a self referral for evaluation of  right second toe injury..     The patient is seen by themselves.  The patient is Right handed    Onset: 9/9/23. Patient describes injury as tripping over the carpet and falling her toe.   Location of Pain: right 2nd toe   Worsened by: Walking, pressure   Better with: Rest   Treatments tried: rest/activity avoidance, elevation, and ice  Associated symptoms: swelling and numbness     Orthopedic/Surgical history: NO  Social History/Occupation: Retired      REVIEW OF SYSTEMS:    Do you have fever, chills, weight loss? No    Do you have any vision problems? No    Do you have any chest pain or edema? No    Do you have any shortness of breath or wheezing?  No    Do you have stomach problems? No    Do you have any numbness or focal weakness? No    Do you have diabetes? No    Do you have problems with bleeding or clotting? No    Do you have an rashes or other skin lesions? No    OBJECTIVE:  There were no vitals taken for this visit.     General  - alert, pleasant, no distress  CV  - normal radial pulse, cap refill brisk  Musculoskeletal -right second toe  - inspection: no atrophy, normal joint alignment, swelling and ecchymosis of the  second toe  - palpation: Tender over the middle and proximal phalanx of the second toe as well as over the dorsal aspect of the PIP joint.  - ROM:  MTP 30 deg flexion   80 deg extension   PIP 50 deg flexion   50 deg extension with pain   DIP 50 deg flexion   50 deg extension  - strength: 5/5 toe flexion, 5/5 toe extension, 5/5 dorsiflexion, 5/5 plantar flexion  - special tests:  None  Neuro  - no numbness, no motor deficit, grossly normal coordination, normal muscle tone  Skin  - no  erythema, warmth, or induration, no obvious rash            RADIOLOGY:    Three-view x-rays of right second toe performed and reviewed independently which demonstrates irregularity at the PIP which given patient's current symptoms likely represents a small nondisplaced fracture.  See EMR for formal radiology report.

## 2023-09-12 ENCOUNTER — ANCILLARY PROCEDURE (OUTPATIENT)
Dept: RADIOLOGY | Facility: AMBULATORY SURGERY CENTER | Age: 73
End: 2023-09-12
Attending: ANESTHESIOLOGY
Payer: COMMERCIAL

## 2023-09-12 ENCOUNTER — HOSPITAL ENCOUNTER (OUTPATIENT)
Facility: AMBULATORY SURGERY CENTER | Age: 73
Discharge: HOME | End: 2023-09-12
Attending: ANESTHESIOLOGY | Admitting: ANESTHESIOLOGY
Payer: COMMERCIAL

## 2023-09-12 VITALS
OXYGEN SATURATION: 98 % | RESPIRATION RATE: 15 BRPM | TEMPERATURE: 97.4 F | HEART RATE: 67 BPM | SYSTOLIC BLOOD PRESSURE: 133 MMHG | DIASTOLIC BLOOD PRESSURE: 78 MMHG

## 2023-09-12 DIAGNOSIS — M54.17 LUMBOSACRAL RADICULOPATHY: ICD-10-CM

## 2023-09-12 PROCEDURE — 62323 NJX INTERLAMINAR LMBR/SAC: CPT

## 2023-09-12 RX ORDER — BUPIVACAINE HYDROCHLORIDE 2.5 MG/ML
INJECTION, SOLUTION INFILTRATION; PERINEURAL DAILY PRN
Status: DISCONTINUED | OUTPATIENT
Start: 2023-09-12 | End: 2023-09-12 | Stop reason: HOSPADM

## 2023-09-12 RX ORDER — LIDOCAINE HYDROCHLORIDE 10 MG/ML
INJECTION, SOLUTION EPIDURAL; INFILTRATION; INTRACAUDAL; PERINEURAL DAILY PRN
Status: DISCONTINUED | OUTPATIENT
Start: 2023-09-12 | End: 2023-09-12 | Stop reason: HOSPADM

## 2023-09-12 RX ORDER — METHYLPREDNISOLONE ACETATE 40 MG/ML
INJECTION, SUSPENSION INTRA-ARTICULAR; INTRALESIONAL; INTRAMUSCULAR; SOFT TISSUE DAILY PRN
Status: DISCONTINUED | OUTPATIENT
Start: 2023-09-12 | End: 2023-09-12 | Stop reason: HOSPADM

## 2023-09-12 RX ORDER — IOPAMIDOL 408 MG/ML
INJECTION, SOLUTION INTRATHECAL DAILY PRN
Status: DISCONTINUED | OUTPATIENT
Start: 2023-09-12 | End: 2023-09-12 | Stop reason: HOSPADM

## 2023-09-12 NOTE — H&P
Nadira Perez  [id number]  female  73 year old      Reason for procedure/surgery: lumbosacral radiculopathy    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    Corneal opacity    Hypothyroidism    Osteopenia, unspecified location    Aromatase inhibitor use    Chronic pain of right knee    DDD (degenerative disc disease), lumbar    Degenerative scoliosis in adult patient    Carpal tunnel syndrome of right wrist    Peripheral neuropathy    Spinal stenosis of lumbar region with neurogenic claudication    Spondylolisthesis of lumbar region    Brain lesion    Dermatochalasis of both upper eyelids    S/P spinal fusion    Chronic bilateral low back pain    PVD (posterior vitreous detachment), left eye    Vitreous opacities of left eye    Closed nondisplaced fracture of lateral malleolus of left fibula, initial encounter    Acute left ankle pain    Sacroiliitis (H)    Lumbar radiculopathy    Lumbosacral radiculopathy       Past Surgical History:    Past Surgical History:   Procedure Laterality Date    ABDOMEN SURGERY      ovarian cyst, mesh    ARTHRODESIS WRIST Right     ARTHRODESIS WRIST  02/14/2013    Procedure: ARTHRODESIS WRIST;  left wrist scaphoid excision, four bone fusion, iliac crest bone graft  ( Mac with block);  Surgeon: Av Mendez MD;  [facility]    BIOPSY      skin, vaginal    BLEPHAROPLASTY BILATERAL Bilateral 5/6/2022    Procedure: UPPER BLEPHAROPLASTY, BILATERAL;  Surgeon: Jemal Sanchez MD;   Location: [facility] OR    CATARACT IOL, RT/LT Right 03/13/2018    CATARACT IOL, RT/LT Left 02/20/2018    COLONOSCOPY  12/24/2013    Procedure: COMBINED COLONOSCOPY, SINGLE BIOPSY/POLYPECTOMY BY BIOPSY;  COLONOSCOPY;  Surgeon: Dom Alvarez MD;  Location:  GI    COSMETIC BLEPHAROPLASTY LOWER LIDS BILATERAL Bilateral 5/6/2022    Procedure: BLEPHAROPLASTY, LOWER EYELID, BILATERAL, COSMETIC;  Surgeon: Jemal Sanchez MD;  Location: [facility] OR    ESOPHAGOSCOPY, GASTROSCOPY, DUODENOSCOPY (EGD), COMBINED N/A 11/23/2016    Procedure: COMBINED ESOPHAGOSCOPY, GASTROSCOPY, DUODENOSCOPY (EGD);  Surgeon: Quinten Feliciano MD;  Location:  GI    EXTERNAL EAR SURGERY      right    EYE SURGERY      radial keratomy    FUSION, SPINE, INTERBODY, OBLIQUE ANTERIOR AND LUMBAR, 2 LEVELS, POST APPROACH, USING OTS N/A 11/18/2021    Procedure: Part 1: Oblique Anterior Interbody Fusion at Lumbar 5 to sacral 1 with use of Bone Morphogenic Protein,;  Surgeon: Serge Ramirez MD;  [facility]: [facility]    GRAFT BONE FROM ILIAC CREST  02/14/2013    Procedure: GRAFT BONE FROM ILIAC CREST;  mac with block and local infilitration;  Surgeon: Av Mendez MD;  [facility]    HC BREATH HYDROGEN TEST N/A 10/14/2016    Procedure: HYDROGEN BREATH TEST;  Surgeon: Cheri Barron MD;  Location:  GI    HERNIA REPAIR      umbilical age 18 mos.    HYSTERECTOMY TOTAL ABDOMINAL  05/03/2000    INJECT EPIDURAL LUMBAR N/A 5/23/2023    Procedure: L3-4 interlaminar epidural steroid injection with fluoroscopy ( left> right);  Surgeon: Natasha Umaña MD;  Location: [facility] OR    INJECT JOINT SACROILIAC Left 4/27/2023    Procedure: Left sacroiliac joint steroid injection with fluoroscopy;  Surgeon: Natasha Umaña MD;  Location: [facility] OR    MASTECTOMY MODIFIED RADICAL Bilateral     bilateral; right breast prophylactic    OPTICAL TRACKING SYSTEM FUSION POSTERIOR SPINE LUMBAR N/A 11/18/2021    Procedure: Open Posterior Instrumented Spinal  Fusion at Lumbar 5 to Sacral 1, with grimm aguayo osteotomy, use of O-Arm/Stealth;  Surgeon: Serge Ramirez MD;  [facility]    PARATHYROIDECTOMY  09/23/2004    R SUPERIOR    PARATHYROIDECTOMY  09/23/2004    parathyroid resection, subtotal    RELEASE CARPAL TUNNEL Right 12/2/2021    Procedure: RIGHT CARPAL TUNNEL RELEASE, RIGHT WRIST HARDWARE REMOVAL, RIGHT CARPAL BOSS EXCISION;  Surgeon: Rolan Conley MD;  Location: [facility] OR    REMOVE HARDWARE HAND  09/24/2013    Procedure: REMOVE HARDWARE HAND;  Left Hand Screw Removal       RHINOPLASTY  1968    thyr proc skin closed cosmetic manner by subcuticular stitch  01/23/2009    THYROPLASTY  10/09/2009    TONSILLECTOMY  1977    VITRECTOMY PARSPLANA WITH 25 GAUGE SYSTEM Left 6/20/2022    Procedure: LEFT EYE VITRECTOMY, PARS PLANA APPROACH, USING 25-GAUGE INSTRUMENTS, laser;  Surgeon: Felix Carlos MD;  Location: [facility] OR    WRIST SURGERY      wrist arthrodesis       Past Medical History:   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    Thyroid disease        Social History:   Social History     Tobacco Use    Smoking status: Never     Passive exposure: Never    Smokeless tobacco: Never   Substance Use Topics    Alcohol use: Not Currently     Alcohol/week: 7.0 standard drinks of alcohol     Types: 7 Glasses of wine per week     Comment: Rare to occasional       Family History:   Family History   Problem Relation Age of Onset    Neurologic Disorder Mother         Anuerysm of Cerebral Artery, Dementia    Diabetes Mother     Thyroid  Disease Mother         ,    Cerebrovascular Disease Mother     Dementia Mother     Osteoporosis Mother     Heart Disease Father         AAA    Hypertension Father     Circulatory Brother         Perihperal Neurophathy    Diabetes Maternal Grandmother     Asthma Maternal Grandmother     Chronic Obstructive Pulmonary Disease Maternal Grandfather         father    Asthma Maternal Grandfather     Diabetes Maternal Aunt         x2    Melanoma Maternal Aunt     Glaucoma Maternal Aunt     Breast Cancer Cousin     Dementia Other     Cancer Other         malignant melanoma    Hypertension Other     Hypertension Other     Cerebrovascular Disease Other     Cerebrovascular Disease Other     Obesity Other     Respiratory Other     Cancer Other     Diabetes Other     Asthma Other     Macular Degeneration No family hx of     Coronary Artery Disease No family hx of     Hyperlipidemia No family hx of     Kidney Disease No family hx of     Thrombosis No family hx of     Arthritis No family hx of     Depression No family hx of     Mental Illness No family hx of     Substance Abuse No family hx of     Cystic Fibrosis No family hx of     Early Death No family hx of     Coronary Artery Disease Early Onset No family hx of     Heart Failure No family hx of     Bleeding Diathesis No family hx of     Ovarian Cancer No family hx of     Uterine Cancer No family hx of     Prostate Cancer No family hx of     Colorectal Cancer No family hx of     Pancreatic Cancer No family hx of     Lung Cancer No family hx of     Other Cancer No family hx of     Autoimmune Disease No family hx of     Unknown/Adopted No family hx of     Genetic Disorder No family hx of     Bleeding Disorder No family hx of     Clotting Disorder No family hx of     Anesthesia Reaction No family hx of        Allergies:   Allergies   Allergen Reactions    Erythromycin Nausea    Penicillins Hives     Around age 6 - doesn't recall full reaction, mother told her it was hives.     Has  tolerated cephalsporins.        Active Medications:   Current Outpatient Medications   Medication Sig Dispense Refill    acetaminophen (TYLENOL) 500 MG tablet Take 500-1,000 mg by mouth every 6 hours as needed for mild pain      amLODIPine (NORVASC) 10 MG tablet Take 1 tablet (10 mg) by mouth daily 90 tablet 3    Ascorbic Acid (VITAMIN C) 500 MG CHEW Take 1 tablet by mouth daily      atorvastatin (LIPITOR) 80 MG tablet Take 1 tablet (80 mg) by mouth daily 90 tablet 3    biotin 1000 MCG TABS tablet Take 1,000 mcg by mouth daily      Cholecalciferol (VITAMIN D3) 50 MCG (2000 UT) CAPS TAKE 3 CAPSULES (6,000 UNITS) BY MOUTH DAILY. 270 capsule 3    CRANBERRY EXTRACT PO Take 650 mg by mouth daily ~10 am  Takes 1      diclofenac (VOLTAREN) 1 % topical gel Apply to toe or other joints painful 100 g 3    gabapentin (NEURONTIN) 300 MG capsule Take 4 capsules (1,200 mg) by mouth 3 times daily 1080 capsule 3    HEMP OIL OR EXTRACT OR OTHER CBD CANNABINOID, NOT MEDICAL CANNABIS, THC      HYDROcodone-acetaminophen (NORCO) 5-325 MG tablet Take 1 tab for breakthrough pain 1-2 times a week. 30 tablet 0    hydrOXYzine (VISTARIL) 25 MG capsule TAKE 1 CAPSULE BY MOUTH AT BEDTIME. 90 capsule 2    levothyroxine (SYNTHROID/LEVOTHROID) 112 MCG tablet TAKE 1/2 TABLET BY MOUTH DAILY 45 tablet 3    lisinopril (ZESTRIL) 40 MG tablet Take 1 tablet (40 mg) by mouth daily 90 tablet 3    magnesium 250 MG tablet Take 1 tablet by mouth daily      melatonin 5 MG tablet Take 10 mg by mouth At Bedtime       methocarbamol (ROBAXIN) 500 MG tablet Take 1 tablet (500 mg) by mouth 3 times daily 40 tablet 4    metoprolol succinate ER (TOPROL XL) 50 MG 24 hr tablet Take 1 tablet (50 mg) by mouth daily 90 tablet 1    Multiple Vitamin (MULTI-VITAMIN) per tablet Take 1 tablet by mouth daily      spironolactone (ALDACTONE) 25 MG tablet Take 2 tablets (50 mg) by mouth daily 180 tablet 1    tiZANidine (ZANAFLEX) 4 MG tablet TAKE 0.5-1 TABLET BY MOUTH 3 TIMES DAILY  60 tablet 0    naloxone (NARCAN) 4 MG/0.1ML nasal spray Spray 1 spray (4 mg) into one nostril alternating nostrils once as needed for opioid reversal every 2-3 minutes until assistance arrives 0.2 mL 0    ondansetron (ZOFRAN-ODT) 4 MG ODT tab Take 1 tablet (4 mg) by mouth every 12 hours as needed for nausea 180 tablet 3    Vaginal Lubricant (REPHRESH) GEL Place 2 g vaginally every 3 days 2 g 3       Systemic Review:   CONSTITUTIONAL: NEGATIVE for fever, chills, change in weight  ENT/MOUTH: NEGATIVE for ear, mouth and throat problems  RESP: NEGATIVE for significant cough or SOB  CV: NEGATIVE for chest pain, palpitations or peripheral edema    Physical Examination:   Vital Signs: /63 (BP Location: Right arm)   Pulse 81   Temp 97.4  F (36.3  C) (Temporal)   Resp 16   SpO2 98%   GENERAL: healthy, alert and no distress  NECK: no adenopathy, no asymmetry, masses, or scars  RESP: lungs clear to auscultation - no rales, rhonchi or wheezes  CV: regular rate and rhythm, normal S1 S2, no S3 or S4, no murmur, click or rub, no peripheral edema and peripheral pulses strong  ABDOMEN: soft, nontender, no hepatosplenomegaly, no masses and bowel sounds normal  MS: no gross musculoskeletal defects noted, no edema    Plan: Appropriate to proceed as scheduled.      Natasha Umaña MD  9/12/2023

## 2023-09-12 NOTE — OP NOTE
Caudal Epidural Steroid Injection    Procedure:  1. Caudal epidural steroid injection  2. Fluoroscopy for needle guidance    Pre-Operative Diagnosis:  1. Lumbosacral radiculopathyclassified  2. Low back pain    Post-Operative Diagnosis:  1. Lumbosacral radiculopathy  2. Low back pain    Anesthesia:  Local anesthesia    Medical Indications:  The patient presents to the clinic today for the treatment of persistent pain. We believe that the pain is spinally mediated. The patient has been exhibiting symptoms consistent with intraspinal inflammation and radiculopathy. Symptoms have been persistent, disabling and intermittently severe. The patient has been evaluated in the pain clinic and scheduled today for a spinal injection to aid in the diagnosis and treatment of presumed spinal pain. The risks, benefits, potential complications, and alternatives were discussed with the patient as per the signed consent form, and informed consent was obtained. The patient has received information about the procedure and its risks. The physician personally confirmed the procedure, side, and site to be injected with the patient and marked the site in the pre-procedure area.    Description of Procedure:  An additional intraoperative timeout, specifically to confirm accurate localization, was conducted by the attending physician. The patient remained awake and alert throughout the procedure. The patient was placed in the prone position. The skin was prepped with chlorhexidine and sterile drapes were applied. The skin and soft tissues were anesthetized with Lidocaine 1%. A 22 gauge 3.5 inch quincke spinal needle was inserted percutaneously and advanced under fluoroscopic guidance using AP, and lateral projections into the caudal epidural space. No paraesthesias occurred and aspiration was negative. Epidurography and radiographic interpretation: Epidurography was performed by injection of Isovue 200 under live fluoroscopy. Multiple Xray  images were obtained. Radiologic examination confirmed proper spread of the contrast medium within the epidural space. There was no evidence of intrathecal or intravascular runoff. The needle was injected with 40 mg methylprednisolone mixed with 4 ml 0.25% bupivacaine. The needle was removed after flushing with 1% lidocaine and sterile bandage was applied. The patient did not experience any hemodynamic or neurologic sequelae.    Complications:  No procedural or immediate post-procedural complications occurred and the patient was transferred to PACU in stable condition.    Procedure Images:  Saved to the chart.    Post-Operative Plan:  The patient will monitor pain relief from today's procedure. They will call the clinic for any problems related to today's procedure. A copy of our written post-procedure instructions was provided. They will return to clinic as scheduled.    Plan Details:  Follow up in the clinic in 3-4 weeks.

## 2023-09-12 NOTE — DISCHARGE INSTRUCTIONS
Home Care Instructions after an Epidural Steroid Pain Injection    A lumbar epidural steroid injection delivers steroid medication directly into the area that may be causing your lower back pain and/or leg pain. A cervical or thoracic epidural steroid injection delivers steroids into the epidural space surrounding spinal nerve roots to help relieve pain in the upper spine/neck.    Activity  -Rest today  -Do not work today  -Resume normal activity tomorrow  -DO NOT shower for 24 hours  -DO NOT remove bandaid for 24 hours    Pain  -You may experience soreness at the injection site for one or two days  -You may use an ice pack for 20 minutes every 2 hours for the first 24 hours  -You may use a heating pad after the first 24 hours  -You may use Tylenol (acetaminophen) every 4 hours or other pain medicines as     directed by your physician    You may experience numbness radiating into your legs or arms (depending on the procedure location). This numbness may last several hours. Until sensation returns to normal; please use caution in walking, climbing stairs, and stepping out of your vehicle, etc.      Common side effects of steroids:  Not everyone will experience corticosteroid side effects. If side effects are experienced, they will gradually subside in the 7-10 day period following an injection. Most common side effects include:  -Flushed face and/or chest  -Feeling of warmth, particularly in the face but could be an overall feeling of warmth  -Increased blood sugar in diabetic patients  -Menstrual irregularities my occur. If taking hormone-based birth control an alternate method of birth control is recommended  -Sleep disturbances and/or mood swings are possible  -Leg cramps    Please contact us if you have:  -Severe pain  -Fever more than 101.5 degrees Fahrenheit  -Signs of infection at the injection site (redness, swelling, or drainage)    FOR PAIN CENTER PATIENTS:  If you have questions, please contact the Pain  Clinic at [phone] Option #1 between the hours of 7:00 am and 3:00 pm Monday through Friday. After office hours you can contact the on call provider by dialing [phone]. If you need immediate attention, we recommend that you go to a hospital emergency room or dial [phone].

## 2023-09-13 ENCOUNTER — MYC MEDICAL ADVICE (OUTPATIENT)
Dept: DERMATOLOGY | Facility: CLINIC | Age: 73
End: 2023-09-13
Payer: COMMERCIAL

## 2023-09-19 ENCOUNTER — MYC REFILL (OUTPATIENT)
Dept: INTERNAL MEDICINE | Facility: CLINIC | Age: 73
End: 2023-09-19
Payer: COMMERCIAL

## 2023-09-19 DIAGNOSIS — M51.369 DDD (DEGENERATIVE DISC DISEASE), LUMBAR: ICD-10-CM

## 2023-09-19 DIAGNOSIS — M48.062 SPINAL STENOSIS OF LUMBAR REGION WITH NEUROGENIC CLAUDICATION: ICD-10-CM

## 2023-09-22 ENCOUNTER — OFFICE VISIT (OUTPATIENT)
Dept: ORTHOPEDICS | Facility: CLINIC | Age: 73
End: 2023-09-22
Payer: COMMERCIAL

## 2023-09-22 ENCOUNTER — TELEPHONE (OUTPATIENT)
Dept: AUDIOLOGY | Facility: CLINIC | Age: 73
End: 2023-09-22

## 2023-09-22 ENCOUNTER — ANCILLARY PROCEDURE (OUTPATIENT)
Dept: GENERAL RADIOLOGY | Facility: CLINIC | Age: 73
End: 2023-09-22
Attending: STUDENT IN AN ORGANIZED HEALTH CARE EDUCATION/TRAINING PROGRAM
Payer: COMMERCIAL

## 2023-09-22 DIAGNOSIS — S99.922A INJURY OF LEFT TOE: Primary | ICD-10-CM

## 2023-09-22 DIAGNOSIS — S92.415A NONDISPLACED FRACTURE OF PROXIMAL PHALANX OF LEFT GREAT TOE, INITIAL ENCOUNTER FOR CLOSED FRACTURE: Primary | ICD-10-CM

## 2023-09-22 PROCEDURE — 99213 OFFICE O/P EST LOW 20 MIN: CPT | Performed by: STUDENT IN AN ORGANIZED HEALTH CARE EDUCATION/TRAINING PROGRAM

## 2023-09-22 PROCEDURE — 73660 X-RAY EXAM OF TOE(S): CPT | Mod: LT | Performed by: RADIOLOGY

## 2023-09-22 NOTE — PROGRESS NOTES
[facility]  Sports Medicine Clinic  Cl[facility] and [facility] Center           SUBJECTIVE       Nadira Perez is a 73 year old female presenting to clinic today with left great toe pain.    Background:   Date of injury: 1 month ago   Duration of symptoms: 1 month, recent injury on Tuesday    Mechanism of Injury: Patient reports a frequent falls. She fell asleep on the toilet   Aggravating factors: Weightbearing    Relieving Factors: Nothing    Prior Evaluation: 9/11/23      PMH, Medications and Allergies were reviewed and updated as needed.    ROS:  As noted above otherwise negative.    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    Corneal opacity    Hypothyroidism    Osteopenia, unspecified location    Aromatase inhibitor use    Chronic pain of right knee    DDD (degenerative disc disease), lumbar    Degenerative scoliosis in adult patient    Carpal tunnel syndrome of right wrist    Peripheral neuropathy    Spinal stenosis of lumbar region with neurogenic claudication    Spondylolisthesis of lumbar region    Brain lesion    Dermatochalasis of both upper eyelids    S/P spinal fusion    Chronic bilateral low back pain    PVD (posterior vitreous detachment), left eye    Vitreous opacities of left eye    Closed nondisplaced fracture of lateral malleolus of left fibula, initial encounter    Acute left ankle pain    Sacroiliitis (H)    Lumbar radiculopathy    Lumbosacral radiculopathy       Current Outpatient Medications    Medication Sig Dispense Refill    acetaminophen (TYLENOL) 500 MG tablet Take 500-1,000 mg by mouth every 6 hours as needed for mild pain      amLODIPine (NORVASC) 10 MG tablet Take 1 tablet (10 mg) by mouth daily 90 tablet 3    Ascorbic Acid (VITAMIN C) 500 MG CHEW Take 1 tablet by mouth daily      atorvastatin (LIPITOR) 80 MG tablet Take 1 tablet (80 mg) by mouth daily 90 tablet 3    biotin 1000 MCG TABS tablet Take 1,000 mcg by mouth daily      Cholecalciferol (VITAMIN D3) 50 MCG (2000 UT) CAPS TAKE 3 CAPSULES (6,000 UNITS) BY MOUTH DAILY. 270 capsule 3    CRANBERRY EXTRACT PO Take 650 mg by mouth daily ~10 am  Takes 1      diclofenac (VOLTAREN) 1 % topical gel Apply to toe or other joints painful 100 g 3    gabapentin (NEURONTIN) 300 MG capsule Take 4 capsules (1,200 mg) by mouth 3 times daily 1080 capsule 3    HEMP OIL OR EXTRACT OR OTHER CBD CANNABINOID, NOT MEDICAL CANNABIS, THC      HYDROcodone-acetaminophen (NORCO) 5-325 MG tablet Take 1 tab for breakthrough pain 1-2 times a week. 30 tablet 0    hydrOXYzine (VISTARIL) 25 MG capsule TAKE 1 CAPSULE BY MOUTH AT BEDTIME. 90 capsule 2    levothyroxine (SYNTHROID/LEVOTHROID) 112 MCG tablet TAKE 1/2 TABLET BY MOUTH DAILY 45 tablet 3    lisinopril (ZESTRIL) 40 MG tablet Take 1 tablet (40 mg) by mouth daily 90 tablet 3    magnesium 250 MG tablet Take 1 tablet by mouth daily      melatonin 5 MG tablet Take 10 mg by mouth At Bedtime       methocarbamol (ROBAXIN) 500 MG tablet Take 1 tablet (500 mg) by mouth 3 times daily 40 tablet 4    metoprolol succinate ER (TOPROL XL) 50 MG 24 hr tablet Take 1 tablet (50 mg) by mouth daily 90 tablet 1    Multiple Vitamin (MULTI-VITAMIN) per tablet Take 1 tablet by mouth daily      naloxone (NARCAN) 4 MG/0.1ML nasal spray Spray 1 spray (4 mg) into one nostril alternating nostrils once as needed for opioid reversal every 2-3 minutes until assistance arrives 0.2 mL 0    ondansetron (ZOFRAN-ODT) 4 MG ODT tab Take 1 tablet (4 mg) by  mouth every 12 hours as needed for nausea 180 tablet 3    spironolactone (ALDACTONE) 25 MG tablet Take 2 tablets (50 mg) by mouth daily 180 tablet 1    tiZANidine (ZANAFLEX) 4 MG tablet TAKE 0.5-1 TABLET BY MOUTH 3 TIMES DAILY 60 tablet 0    Vaginal Lubricant (REPHRESH) GEL Place 2 g vaginally every 3 days 2 g 3            OBJECTIVE:       Vitals: There were no vitals filed for this visit.  BMI: There is no height or weight on file to calculate BMI.    Gen:  Well nourished and in no acute distress  HEENT: Extraocular movement intact  Neck: Supple  Pulm:  Breathing Comfortably. No increased respiratory effort.  Psych: Euthymic. Appropriately answers questions    MSK: Mild purplish ecchymosis along the dorsum of the left great toe.  Tenderness to palpation along the proximal phalanx of the great toe, medial aspect, as well as the extensor longus tendon.  No other tenderness to palpation of the foot.  Good ankle range of motion.  Sensation appropriate.  No edema or or warmth.  No erythema.      XRAY : Independent evaluation of the left great toe shows mild what appears to be avulsion injury along the proximal head neck region on AP view, query old healing fracture along the proximal head neck region of the great toe, nondisplaced.  Will await official radiologic read as well.          ASSESSMENT and PLAN:     Nadira was seen today for pain.    Diagnoses and all orders for this visit:    Nondisplaced fracture of proximal phalanx of left great toe, initial encounter for closed fracture      73-year-old female presenting to clinic today, past medical history significant for low bone mineral density on treatment, presenting with subacute injury to the left foot.  X-rays were obtained today and discussed with the patient in detail.  She does have ecchymosis as well as signs and symptoms suggestive of avulsion injury and nondisplaced injury to the proximal phalanx head neck region of the left great toe.  Have discussed  this at length with patient.  At this time, have started her on dorian taping.  I do think some of this could be an old injury as of now, given the fact she did not have any acute injuries except for mild fall, mechanical, several days ago.  That fall did not involve her foot per her though, she just did not have a chance to bring up the left foot on her previous visit here at the sports medicine center with Dr. Hopkins, as her right foot was more bothersome.  In addition to the dorian taping, we have provided the patient with a postop shoe.  Given her multiple falls, and bilateral neuropathy, I do think a cam boot will lead to ambulation difficulties and increase her risk for adverse events.  We will see the patient back in 3 weeks with new x-rays and hopeful removing of restrictions.  Return precautions and the ER/urgent care precautions advised.    Options for treatment and/or follow-up care were reviewed with the patient was actively involved in the decision making process. Patient verbalized understanding and was in agreement with the plan.    Rolan Morgan DO  , Sports Medicine  Department of Family Medicine and [facility]

## 2023-09-22 NOTE — LETTER
9/22/2023      RE: Nadira Perez  [id number]  [facility] [address]     Dear Colleague,    Thank you for referring your patient, Nadira Perez, to the [facility]IN[facility] [place]. Please see a copy of my visit note below.    [facility]  Sports Medicine Clinic  Clinics and Surgery Center           SUBJECTIVE       Nadira Perez is a 73 year old female presenting to clinic today with left great toe pain.    Background:   Date of injury: 1 month ago   Duration of symptoms: 1 month, recent injury on Tuesday    Mechanism of Injury: Patient reports a frequent falls. She fell asleep on the toilet   Aggravating factors: Weightbearing    Relieving Factors: Nothing    Prior Evaluation: 9/11/23      PMH, Medications and Allergies were reviewed and updated as needed.    ROS:  As noted above otherwise negative.    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    Corneal opacity    Hypothyroidism    Osteopenia, unspecified location    Aromatase inhibitor use    Chronic pain of right knee    DDD (degenerative disc disease), lumbar    Degenerative scoliosis in adult patient    Carpal tunnel syndrome of right wrist    Peripheral neuropathy    Spinal stenosis of lumbar region with neurogenic claudication    Spondylolisthesis of lumbar region    Brain lesion    Dermatochalasis of both upper eyelids    S/P spinal fusion    Chronic bilateral low back pain    PVD (posterior vitreous detachment),  left eye    Vitreous opacities of left eye    Closed nondisplaced fracture of lateral malleolus of left fibula, initial encounter    Acute left ankle pain    Sacroiliitis (H)    Lumbar radiculopathy    Lumbosacral radiculopathy       Current Outpatient Medications   Medication Sig Dispense Refill    acetaminophen (TYLENOL) 500 MG tablet Take 500-1,000 mg by mouth every 6 hours as needed for mild pain      amLODIPine (NORVASC) 10 MG tablet Take 1 tablet (10 mg) by mouth daily 90 tablet 3    Ascorbic Acid (VITAMIN C) 500 MG CHEW Take 1 tablet by mouth daily      atorvastatin (LIPITOR) 80 MG tablet Take 1 tablet (80 mg) by mouth daily 90 tablet 3    biotin 1000 MCG TABS tablet Take 1,000 mcg by mouth daily      Cholecalciferol (VITAMIN D3) 50 MCG (2000 UT) CAPS TAKE 3 CAPSULES (6,000 UNITS) BY MOUTH DAILY. 270 capsule 3    CRANBERRY EXTRACT PO Take 650 mg by mouth daily ~10 am  Takes 1      diclofenac (VOLTAREN) 1 % topical gel Apply to toe or other joints painful 100 g 3    gabapentin (NEURONTIN) 300 MG capsule Take 4 capsules (1,200 mg) by mouth 3 times daily 1080 capsule 3    HEMP OIL OR EXTRACT OR OTHER CBD CANNABINOID, NOT MEDICAL CANNABIS, THC      HYDROcodone-acetaminophen (NORCO) 5-325 MG tablet Take 1 tab for breakthrough pain 1-2 times a week. 30 tablet 0    hydrOXYzine (VISTARIL) 25 MG capsule TAKE 1 CAPSULE BY MOUTH AT BEDTIME. 90 capsule 2    levothyroxine (SYNTHROID/LEVOTHROID) 112 MCG tablet TAKE 1/2 TABLET BY MOUTH DAILY 45 tablet 3    lisinopril (ZESTRIL) 40 MG tablet Take 1 tablet (40 mg) by mouth daily 90 tablet 3    magnesium 250 MG tablet Take 1 tablet by mouth daily      melatonin 5 MG tablet Take 10 mg by mouth At Bedtime       methocarbamol (ROBAXIN) 500 MG tablet Take 1 tablet (500 mg) by mouth 3 times daily 40 tablet 4    metoprolol succinate ER (TOPROL XL) 50 MG 24 hr tablet Take 1 tablet (50 mg) by mouth daily 90 tablet 1    Multiple Vitamin (MULTI-VITAMIN) per tablet Take 1 tablet by mouth  daily      naloxone (NARCAN) 4 MG/0.1ML nasal spray Spray 1 spray (4 mg) into one nostril alternating nostrils once as needed for opioid reversal every 2-3 minutes until assistance arrives 0.2 mL 0    ondansetron (ZOFRAN-ODT) 4 MG ODT tab Take 1 tablet (4 mg) by mouth every 12 hours as needed for nausea 180 tablet 3    spironolactone (ALDACTONE) 25 MG tablet Take 2 tablets (50 mg) by mouth daily 180 tablet 1    tiZANidine (ZANAFLEX) 4 MG tablet TAKE 0.5-1 TABLET BY MOUTH 3 TIMES DAILY 60 tablet 0    Vaginal Lubricant (REPHRESH) GEL Place 2 g vaginally every 3 days 2 g 3            OBJECTIVE:       Vitals: There were no vitals filed for this visit.  BMI: There is no height or weight on file to calculate BMI.    Gen:  Well nourished and in no acute distress  HEENT: Extraocular movement intact  Neck: Supple  Pulm:  Breathing Comfortably. No increased respiratory effort.  Psych: Euthymic. Appropriately answers questions    MSK: Mild purplish ecchymosis along the dorsum of the left great toe.  Tenderness to palpation along the proximal phalanx of the great toe, medial aspect, as well as the extensor longus tendon.  No other tenderness to palpation of the foot.  Good ankle range of motion.  Sensation appropriate.  No edema or or warmth.  No erythema.      XRAY : Independent evaluation of the left great toe shows mild what appears to be avulsion injury along the proximal head neck region on AP view, query old healing fracture along the proximal head neck region of the great toe, nondisplaced.  Will await official radiologic read as well.          ASSESSMENT and PLAN:     Nadira was seen today for pain.    Diagnoses and all orders for this visit:    Nondisplaced fracture of proximal phalanx of left great toe, initial encounter for closed fracture      73-year-old female presenting to clinic today, past medical history significant for low bone mineral density on treatment, presenting with subacute injury to the left foot.   X-rays were obtained today and discussed with the patient in detail.  She does have ecchymosis as well as signs and symptoms suggestive of avulsion injury and nondisplaced injury to the proximal phalanx head neck region of the left great toe.  Have discussed this at length with patient.  At this time, have started her on dorian taping.  I do think some of this could be an old injury as of now, given the fact she did not have any acute injuries except for mild fall, mechanical, several days ago.  That fall did not involve her foot per her though, she just did not have a chance to bring up the left foot on her previous visit here at the sports medicine center with Dr. Hopkins, as her right foot was more bothersome.  In addition to the dorian taping, we have provided the patient with a postop shoe.  Given her multiple falls, and bilateral neuropathy, I do think a cam boot will lead to ambulation difficulties and increase her risk for adverse events.  We will see the patient back in 3 weeks with new x-rays and hopeful removing of restrictions.  Return precautions and the ER/urgent care precautions advised.    Options for treatment and/or follow-up care were reviewed with the patient was actively involved in the decision making process. Patient verbalized understanding and was in agreement with the plan.    Rolan Morgan DO  , Sports Medicine  Department of Family Medicine and [facility]      Again, thank you for allowing me to participate in the care of your patient.      Sincerely,    Rolan Morgan DO

## 2023-09-24 RX ORDER — HYDROCODONE BITARTRATE AND ACETAMINOPHEN 5; 325 MG/1; MG/1
TABLET ORAL
Qty: 30 TABLET | Refills: 0 | Status: SHIPPED | OUTPATIENT
Start: 2023-10-05 | End: 2023-11-30

## 2023-09-24 NOTE — TELEPHONE ENCOUNTER
Walk-in hearing aid services on 9/24/23: The patient asked to have both of her hearing aids cleaned.  Both hearing aids were cleaned and the  filters and domes were replaced.  A listening check found the hearing aids to be working properly and they were returned to the patient.  She was also again provided with [employer] insurance application forms and contact information.

## 2023-09-26 ENCOUNTER — OFFICE VISIT (OUTPATIENT)
Dept: DERMATOLOGY | Facility: CLINIC | Age: 73
End: 2023-09-26
Payer: COMMERCIAL

## 2023-09-26 ENCOUNTER — ANCILLARY PROCEDURE (OUTPATIENT)
Dept: ULTRASOUND IMAGING | Facility: CLINIC | Age: 73
End: 2023-09-26
Attending: DERMATOLOGY
Payer: COMMERCIAL

## 2023-09-26 DIAGNOSIS — D21.21 BENIGN NEOPLASM OF CONNECTIVE AND OTHER SOFT TISSUE OF RIGHT LOWER LIMB, INCLUDING HIP: Primary | ICD-10-CM

## 2023-09-26 DIAGNOSIS — D21.21 BENIGN NEOPLASM OF CONNECTIVE AND OTHER SOFT TISSUE OF RIGHT LOWER LIMB, INCLUDING HIP: ICD-10-CM

## 2023-09-26 DIAGNOSIS — D18.01 CHERRY ANGIOMA: ICD-10-CM

## 2023-09-26 DIAGNOSIS — L81.4 LENTIGINES: ICD-10-CM

## 2023-09-26 DIAGNOSIS — L82.1 SEBORRHEIC KERATOSES: ICD-10-CM

## 2023-09-26 DIAGNOSIS — L82.0 INFLAMED SEBORRHEIC KERATOSIS: ICD-10-CM

## 2023-09-26 DIAGNOSIS — L72.0 MILIA: ICD-10-CM

## 2023-09-26 DIAGNOSIS — D22.5 MELANOCYTIC NEVUS OF TRUNK: ICD-10-CM

## 2023-09-26 PROCEDURE — 99213 OFFICE O/P EST LOW 20 MIN: CPT | Mod: 25 | Performed by: DERMATOLOGY

## 2023-09-26 PROCEDURE — 17110 DESTRUCTION B9 LES UP TO 14: CPT | Mod: GC | Performed by: DERMATOLOGY

## 2023-09-26 PROCEDURE — 76882 US LMTD JT/FCL EVL NVASC XTR: CPT | Mod: RT | Performed by: RADIOLOGY

## 2023-09-26 ASSESSMENT — PAIN SCALES - GENERAL: PAINLEVEL: NO PAIN (0)

## 2023-09-26 NOTE — LETTER
9/26/2023       RE: Nadira Perez  [address]  [facility] [address]     Dear Colleague,    Thank you for referring your patient, Nadira Perez, to the [facility]ERMATOLOGY [facility] [place] at [facility]. Please see a copy of my visit note below.    [facility] Dermatology Note  Encounter Date: Sep 26, 2023  Office Visit     Dermatology Problem List:  FBSE 11/1/2022  1. Hx SCC of the perineum, tx w/ excision and radiation in 1980  2. SK's. Multiple over chest back and face, and legs, s/p cryo  3. Tinea pedis w/ onychomycosis   - Terbinafine cream  4. Seborrheic dermatitis  - 2% ketoconazole shampoo, alternate with Head and Shoulders  5. Hand dermatitis, triamcinolone 0.1% cream PRN  6. Hx breast cancer  7. Sbucutaneous nodule, right shin. Lipoma vs vascular  - ultrasound ordered 9/2023    ____________________________________________    Assessment & Plan:     # History of SCC of perineum 1980.  - NTD: No further management at this time.  - Sun protection: Counseled SPF30+ sunscreen, UPF clothing, sun avoidance, tanning bed avoidance.       # Inflamed seborrheic keratoses.  - Cryotherapy performed today (see procedure note(s) below).     # Seborrheic keratoses, cherry angioma, milia, ecchymosis, melanocytic nevi, lentigines.  - NTD: No further management at this time.  - Reassured of benign etiology     # Benign neoplasm of right lower leg. Tender. Ddx lipoma vs vascular. Will proceed with ultrasound then send referral to derm surgery or vascular pending results.  - ultrasound right lower extremity      Procedures Performed:   - Cryotherapy procedure note, location(s): right upper chest. After verbal consent and discussion of risks and benefits including, but not limited to, dyspigmentation/scar, blister, and pain, 1 lesion(s) was(were) treated with 1-2 mm freeze border for 1-2 cycles with liquid nitrogen. Post cryotherapy  instructions were provided.      Follow-up: 1 year(s) in-person, or earlier for new or changing lesions    Staff and Resident:    I, Ben King MD, discussed and evaluated the patient with Dr. Wilkerson.    Staff Physician Comments:   I saw and evaluated the patient with the resident and I agree with the assessment and plan.  I was present for the entire minor procedure and examination. I have made edits if needed.    Abe Wilkerson MD  Staff Dermatologist and Dermatopathologist  , Department of Dermatology     ____________________________________________    CC: Derm Problem (Right lower leg: possible lipoma per patient/Spots of concern: red lesion on right lower leg and check groin)    HPI:  Ms. Nadira Perez is a(n) 73 year old female who presents today as a return patient for skin check. Has a tender mobile nodule on her right shin. Been there for a few months. Also with a red patch on right shin for last few days that is asymptomatic. Has an itchy spot on right chest.    Patient is otherwise feeling well, without additional skin concerns.    Labs Reviewed:  N/A    Physical Exam:  Vitals: There were no vitals taken for this visit.  SKIN: Full skin, which includes the head/face, both arms, chest, back, abdomen,both legs, genitalia and/or groin buttocks, digits and/or nails, was examined.  - Freely mobile, tender subcutaneous nodule on right shin  - Inferior to nodule there is a ovoid erythematous petechial patch  - White dome topped 1-2 mm papules of face  - There are dome shaped bright red papules on the trunk and extremities.   - Multiple regular brown pigmented macules and papules are identified on the trunk and extremities.   - Scattered brown macules on sun exposed areas.  - There are waxy stuck on tan to brown papules on the trunk and extremities.  - There is a tan to brown waxy stuck on papule with surrounding erythema on the right upper chest.   - Scattered telangiectasias over  the chest.   - No other lesions of concern on areas examined.     Medications:  Current Outpatient Medications   Medication    acetaminophen (TYLENOL) 500 MG tablet    amLODIPine (NORVASC) 10 MG tablet    Ascorbic Acid (VITAMIN C) 500 MG CHEW    atorvastatin (LIPITOR) 80 MG tablet    biotin 1000 MCG TABS tablet    Cholecalciferol (VITAMIN D3) 50 MCG (2000 UT) CAPS    CRANBERRY EXTRACT PO    diclofenac (VOLTAREN) 1 % topical gel    gabapentin (NEURONTIN) 300 MG capsule    HEMP OIL OR EXTRACT OR OTHER CBD CANNABINOID, NOT MEDICAL CANNABIS,    [START ON 10/5/2023] HYDROcodone-acetaminophen (NORCO) 5-325 MG tablet    hydrOXYzine (VISTARIL) 25 MG capsule    levothyroxine (SYNTHROID/LEVOTHROID) 112 MCG tablet    lisinopril (ZESTRIL) 40 MG tablet    magnesium 250 MG tablet    melatonin 5 MG tablet    methocarbamol (ROBAXIN) 500 MG tablet    metoprolol succinate ER (TOPROL XL) 50 MG 24 hr tablet    Multiple Vitamin (MULTI-VITAMIN) per tablet    naloxone (NARCAN) 4 MG/0.1ML nasal spray    ondansetron (ZOFRAN-ODT) 4 MG ODT tab    spironolactone (ALDACTONE) 25 MG tablet    tiZANidine (ZANAFLEX) 4 MG tablet    Vaginal Lubricant (REPHRESH) GEL     Current Facility-Administered Medications   Medication    dexamethasone (DECADRON) injection 1 mL    dexamethasone (DECADRON) injection 1 mL    romosozumab-aqqg (EVENITY) injection 210 mg    romosozumab-aqqg (EVENITY) injection 210 mg    triamcinolone (KENALOG-40) injection 40 mg    triamcinolone (KENALOG-40) injection 40 mg    triamcinolone (KENALOG-40) injection 40 mg      Past Medical History:   Patient Active Problem List   Diagnosis    Infiltrating ductal ca grade 2, ERpositive, PRpositive, HER2 negative by FISH    Hypopotassemia    Hyperlipidemia    Murmurs    Nephrolithiasis    Osteoarthrosis, hand    Personal history of other malignant neoplasm of skin    Inflamed seborrheic keratosis    Essential hypertension, benign    Osteoporosis    Mechanical problems with limbs     Hypovitaminosis D    Contact dermatitis and other eczema, due to unspecified cause    Dermatitis    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    Corneal opacity    Hypothyroidism    Osteopenia, unspecified location    Aromatase inhibitor use    Chronic pain of right knee    DDD (degenerative disc disease), lumbar    Degenerative scoliosis in adult patient    Carpal tunnel syndrome of right wrist    Peripheral neuropathy    Spinal stenosis of lumbar region with neurogenic claudication    Spondylolisthesis of lumbar region    Brain lesion    Dermatochalasis of both upper eyelids    S/P spinal fusion    Chronic bilateral low back pain    PVD (posterior vitreous detachment), left eye    Vitreous opacities of left eye    Closed nondisplaced fracture of lateral malleolus of left fibula, initial encounter    Acute left ankle pain    Sacroiliitis (H)    Lumbar radiculopathy    Lumbosacral radiculopathy     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 on close of this encounter.

## 2023-09-26 NOTE — PATIENT INSTRUCTIONS
"We will plan to get an ultrasound of the right leg. We will be in touch with the results and send you to the appropriate specialty to get it removed.    Keep up the good work. Skin check looks great.     Cryotherapy Instructions    For the areas treated with liquid nitrogen (cryotherapy or freezing) today, they are expected to get pink, puffy, and perhaps even blister. The area should then crust up and fall off and the goal is to have nice new skin underneath. There is nothing special that you need to do for these areas. You can wash them as you do normal skin.     Sometimes a blister develops; if a blister does develop do NOT pop it. However, if it breaks open on its own, be sure to wash it with soap and water daily and put plain vasaline or petroleum jelly and a bandaid on it until the skin is healed.     Please call the clinic if you have any questions or concerns.     Sun Protection    Sunscreen   What does \"broad spectrum mean\"?  Broad spectrum sunscreens protect against both UVA and UVB radiation. UVC is filtered out by the ozone layer.     What does SPF mean?   SPF stands for  Sun Protection Factor  and represents the ability to screen only UVB (burning) rays. UVB rays are mostly blocked in all sunscreens, but only those that contain titanium dioxide, zinc oxide, mexoryl or Parsol 1789 (avobenzone) block the UVA spectrum. Even though a sunscreen is labeled  UVA/UVB Protection  that is not entirely accurate because products that only partially protect against UVA can claim to protect against both UVA and UVB.     What SPF should I chose?   Aim to get a sunscreen that is at least sun protection factor (SPF) 30. SPF 15 provides about 92-93% coverage, SPF 30 about 95-97% coverage, and SPF 45 about 98% coverage. That is to say, SPF 30 is not twice as good as SPF 15. The reason why we recommend SPF 30 is because we are usually only putting on half the necessary amount of sunscreen to achieve the advertised " protection. That means that it is very possible that your SPF 30 sunscreen is only providing you with SPF 15 coverage based on how much you are applying. SPF 15 (92-93% coverage) is the absolute minimal that we recommend. Similarly, the benefit of sunscreens with SPF higher than 50 is that even if you put on less than the required amount, you are likely still getting good protection (ex: even if you apply only half the recommended amount of , it should still provide you with an SPF of 50).     How much should I apply?  If covering your whole body, you should be using 30 grams, or one ounce, which is how much is in one shot glass! That s a THICK layer! May times, you are only applying half the recommended amount, which means that you are only getting half the SPF (for example, you may be using SPF 30 but if you're only applying half the recommended amount, you're only getting SPF 15 protection.      When do I need to wear sunscreen?  Every day, rain or shine! Even on a rainy day or a day when you are only indoors, you are still being exposed harmful UV radiation from the sun. We usually recommend physical/mineral sunscreens (active ingredient is titanium dioxide or zinc oxide) as these ingredients have been around for many years, there is no concern of them being absorbed into the bloodstream, and they are coral reef friendly! However, the best sunscreen is the one that you will use everyday.     What about my kids?  Sunscreen is not recommended for infants under the age of 6 months. Use clothing, shade and sun avoidance for small infants. For kids older than 6 months, we recommend that you should use only mineral/physical sunscreens that have zinc oxide as the active ingredient. Sun-protective clothing and hats are also important for people of all ages.     Sun protective Clothing:  www.coolibar.com  www.sunprecautions.com  www.danika.com    Do I need tinted sunscreen?  There is more and more research showing  that visible light can also lead to discoloration (such as melasma). Tinted sunscreens (which contain iron oxides) protect against visible light as an added bonus.     What brands do you recommend?    Physical/Mineral Sunscreens (in no particular order)  Elta MD UV Physical Broad-Spectrum SPF 41 Sunscreen (Tinted, $33)  Skin Ceuticals Physical Fusion UV Defense SPF 50 (Tinted, $34)  Unsun Mineral Tinted Face Sunscreen (Tinted, with 2 shade ranges, $29)  It Cosmetics CC+ Cream with SPF 50+ (Tinted, can also double as foundation/coverage -- great range of shades, $40)  Biossance Squalane + Zinc Sheer Mineral Sunscreen SPF 30 PA +++ (goes on white then blends in, $30)  Cerave 100% Mineral Sunscreen SPF 50 Face (good for sensitive skin, $15)  La Roche Posay Anthelios Mineral Zinc Oxide Sunscreen SPF 50 ($35)  La Roche Posay Anthelios Mineral Tinted Sunscreen for Face SPF 50 ($35)  Think Sport Sunscreen (great for sports, though has more of a white cast, $20)  Think Baby Sunscreen (for kids, $21)  Color Science Sunforgettable Total Protection Brush On Shield SPF 50 (Multiple tints, $130)    Chemical Sunscreens  Kassie Dumont Weightless Protection SPF 30 ($48)  Donavan SPF Brightening Moisturizer ($30)  Urban Skin Complexion Protection Moisturizer SPF 30 ($20)  Total Defense + Repair Broad Spectrum SPF 34 ($68)  Clarins UV PLUS Anti-Pollution Sunscreen Multi-Protection Tint SPF 50 (Multiple tints, $45)  Neutrogena Healthy Skin Glow Sheers Tinted Moisturizer with SPF 20 (Multiple tints, $11)    The ABCDEs of Melanoma  Skin cancer can develop anywhere on the skin. Once a month, take a look at your entire body and note any changing moles or spots. Ask someone for help when checking your skin, especially for hard to see places such as your back. If you notice a mole that looks different from others, or one that changes, enlarges, itches, or bleeds, you should see a dermatologist.    Asymmetry, Border (irregularity), Color (not  "uniform, changes in color), Diameter (greater than 6 mm which is about the size of a pencil eraser), and Evolving (any changes in pre-existing moles). In short, look for the \"ugly duckling.\" You want all of the spots on your body to look like cousins (like they could be related). If something stands out, take a photo of it and make an appointment to have it evaluated.     "

## 2023-09-26 NOTE — PROGRESS NOTES
[facility] Dermatology Note  Encounter Date: Sep 26, 2023  Office Visit     Dermatology Problem List:  FBSE 11/1/2022  1. Hx SCC of the perineum, tx w/ excision and radiation in 1980  2. SK's. Multiple over chest back and face, and legs, s/p cryo  3. Tinea pedis w/ onychomycosis   - Terbinafine cream  4. Seborrheic dermatitis  - 2% ketoconazole shampoo, alternate with Head and Shoulders  5. Hand dermatitis, triamcinolone 0.1% cream PRN  6. Hx breast cancer  7. Sbucutaneous nodule, right shin. Lipoma vs vascular  - ultrasound ordered 9/2023    ____________________________________________    Assessment & Plan:     # History of SCC of perineum 1980.  - NTD: No further management at this time.  - Sun protection: Counseled SPF30+ sunscreen, UPF clothing, sun avoidance, tanning bed avoidance.       # Inflamed seborrheic keratoses.  - Cryotherapy performed today (see procedure note(s) below).     # Seborrheic keratoses, cherry angioma, milia, ecchymosis, melanocytic nevi, lentigines.  - NTD: No further management at this time.  - Reassured of benign etiology     # Benign neoplasm of right lower leg. Tender. Ddx lipoma vs vascular. Will proceed with ultrasound then send referral to derm surgery or vascular pending results.  - ultrasound right lower extremity      Procedures Performed:   - Cryotherapy procedure note, location(s): right upper chest. After verbal consent and discussion of risks and benefits including, but not limited to, dyspigmentation/scar, blister, and pain, 1 lesion(s) was(were) treated with 1-2 mm freeze border for 1-2 cycles with liquid nitrogen. Post cryotherapy instructions were provided.      Follow-up: 1 year(s) in-person, or earlier for new or changing lesions    Staff and Resident:    I, Ben King MD, discussed and evaluated the patient with Dr. Wilkerson.    Staff Physician Comments:   I saw and evaluated the patient with the resident and I agree with the assessment and plan.   I was present for the entire minor procedure and examination. I have made edits if needed.    Abe Wilkerson MD  Staff Dermatologist and Dermatopathologist  , Department of Dermatology     ____________________________________________    CC: Derm Problem (Right lower leg: possible lipoma per patient/Spots of concern: red lesion on right lower leg and check groin)    HPI:  Ms. Nadira Perez is a(n) 73 year old female who presents today as a return patient for skin check. Has a tender mobile nodule on her right shin. Been there for a few months. Also with a red patch on right shin for last few days that is asymptomatic. Has an itchy spot on right chest.    Patient is otherwise feeling well, without additional skin concerns.    Labs Reviewed:  N/A    Physical Exam:  Vitals: There were no vitals taken for this visit.  SKIN: Full skin, which includes the head/face, both arms, chest, back, abdomen,both legs, genitalia and/or groin buttocks, digits and/or nails, was examined.  - Freely mobile, tender subcutaneous nodule on right shin  - Inferior to nodule there is a ovoid erythematous petechial patch  - White dome topped 1-2 mm papules of face  - There are dome shaped bright red papules on the trunk and extremities.   - Multiple regular brown pigmented macules and papules are identified on the trunk and extremities.   - Scattered brown macules on sun exposed areas.  - There are waxy stuck on tan to brown papules on the trunk and extremities.  - There is a tan to brown waxy stuck on papule with surrounding erythema on the right upper chest.   - Scattered telangiectasias over the chest.   - No other lesions of concern on areas examined.     Medications:  Current Outpatient Medications   Medication     acetaminophen (TYLENOL) 500 MG tablet     amLODIPine (NORVASC) 10 MG tablet     Ascorbic Acid (VITAMIN C) 500 MG CHEW     atorvastatin (LIPITOR) 80 MG tablet     biotin 1000 MCG TABS tablet      Cholecalciferol (VITAMIN D3) 50 MCG (2000 UT) CAPS     CRANBERRY EXTRACT PO     diclofenac (VOLTAREN) 1 % topical gel     gabapentin (NEURONTIN) 300 MG capsule     HEMP OIL OR EXTRACT OR OTHER CBD CANNABINOID, NOT MEDICAL CANNABIS,     [START ON 10/5/2023] HYDROcodone-acetaminophen (NORCO) 5-325 MG tablet     hydrOXYzine (VISTARIL) 25 MG capsule     levothyroxine (SYNTHROID/LEVOTHROID) 112 MCG tablet     lisinopril (ZESTRIL) 40 MG tablet     magnesium 250 MG tablet     melatonin 5 MG tablet     methocarbamol (ROBAXIN) 500 MG tablet     metoprolol succinate ER (TOPROL XL) 50 MG 24 hr tablet     Multiple Vitamin (MULTI-VITAMIN) per tablet     naloxone (NARCAN) 4 MG/0.1ML nasal spray     ondansetron (ZOFRAN-ODT) 4 MG ODT tab     spironolactone (ALDACTONE) 25 MG tablet     tiZANidine (ZANAFLEX) 4 MG tablet     Vaginal Lubricant (REPHRESH) GEL     Current Facility-Administered Medications   Medication     dexamethasone (DECADRON) injection 1 mL     dexamethasone (DECADRON) injection 1 mL     romosozumab-aqqg (EVENITY) injection 210 mg     romosozumab-aqqg (EVENITY) injection 210 mg     triamcinolone (KENALOG-40) injection 40 mg     triamcinolone (KENALOG-40) injection 40 mg     triamcinolone (KENALOG-40) injection 40 mg      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     Corneal opacity     Hypothyroidism     Osteopenia, unspecified location     Aromatase inhibitor use     Chronic pain of right knee      DDD (degenerative disc disease), lumbar     Degenerative scoliosis in adult patient     Carpal tunnel syndrome of right wrist     Peripheral neuropathy     Spinal stenosis of lumbar region with neurogenic claudication     Spondylolisthesis of lumbar region     Brain lesion     Dermatochalasis of both upper eyelids     S/P spinal fusion     Chronic bilateral low back pain     PVD (posterior vitreous detachment), left eye     Vitreous opacities of left eye     Closed nondisplaced fracture of lateral malleolus of left fibula, initial encounter     Acute left ankle pain     Sacroiliitis (H)     Lumbar radiculopathy     Lumbosacral radiculopathy     Past Medical History:   Diagnosis Date     Arthritis      Bone disease      Breast cancer (H)      H/O kyphoplasty      Hearing problem      History of kidney stones      History of radiation therapy      Hyperlipemia      Hypertension      Hypopotassemia      Irregular heart beat      Kidney problem      Lymph edema      Medullary sponge kidney      Osteopenia      PONV (postoperative nausea and vomiting)      Reduced vision      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     Thyroid disease        CC No referring provider defined for this encounter. on close of this encounter.

## 2023-09-26 NOTE — NURSING NOTE
Dermatology Rooming Note    Nadira Perez's goals for this visit include:   Chief Complaint   Patient presents with    Derm Problem     Right lower leg: possible lipoma per patient  Spots of concern: red lesion on right lower leg and check groin     Odalis Oneal LPN

## 2023-10-18 DIAGNOSIS — S99.922D INJURY OF TOE ON LEFT FOOT, SUBSEQUENT ENCOUNTER: Primary | ICD-10-CM

## 2023-10-19 DIAGNOSIS — E03.9 HYPOTHYROIDISM, UNSPECIFIED TYPE: ICD-10-CM

## 2023-10-19 DIAGNOSIS — E78.00 PURE HYPERCHOLESTEROLEMIA: ICD-10-CM

## 2023-10-20 ENCOUNTER — OFFICE VISIT (OUTPATIENT)
Dept: ORTHOPEDICS | Facility: CLINIC | Age: 73
End: 2023-10-20
Payer: COMMERCIAL

## 2023-10-20 ENCOUNTER — ANCILLARY PROCEDURE (OUTPATIENT)
Dept: GENERAL RADIOLOGY | Facility: CLINIC | Age: 73
End: 2023-10-20
Attending: STUDENT IN AN ORGANIZED HEALTH CARE EDUCATION/TRAINING PROGRAM
Payer: COMMERCIAL

## 2023-10-20 VITALS — BODY MASS INDEX: 31.79 KG/M2 | WEIGHT: 194 LBS

## 2023-10-20 DIAGNOSIS — S99.922D INJURY OF TOE ON LEFT FOOT, SUBSEQUENT ENCOUNTER: ICD-10-CM

## 2023-10-20 DIAGNOSIS — S99.922D INJURY OF TOE ON LEFT FOOT, SUBSEQUENT ENCOUNTER: Primary | ICD-10-CM

## 2023-10-20 PROCEDURE — 73660 X-RAY EXAM OF TOE(S): CPT | Mod: LT | Performed by: RADIOLOGY

## 2023-10-20 PROCEDURE — 99213 OFFICE O/P EST LOW 20 MIN: CPT | Performed by: STUDENT IN AN ORGANIZED HEALTH CARE EDUCATION/TRAINING PROGRAM

## 2023-10-20 RX ORDER — ATORVASTATIN CALCIUM 80 MG/1
80 TABLET, FILM COATED ORAL DAILY
Qty: 90 TABLET | Refills: 0 | Status: SHIPPED | OUTPATIENT
Start: 2023-10-20 | End: 2023-11-30

## 2023-10-20 RX ORDER — LEVOTHYROXINE SODIUM 112 UG/1
TABLET ORAL
Qty: 45 TABLET | Refills: 0 | Status: SHIPPED | OUTPATIENT
Start: 2023-10-20 | End: 2024-01-18

## 2023-10-20 NOTE — LETTER
10/20/2023      RE: Nadira Perez  [address]  [place] [facility] [place] [place] [address]     Dear Colleague,    Thank you for referring your patient, Nadira Perez, to the [facility]. Please see a copy of my visit note below.    [facility]  Sports Medicine Clinic  [facility]           SUBJECTIVE       Nadira Perez is a 73 year old female presenting to clinic today for left toe return.  Patient is overall doing very well.  No pain.  Is ambulating without difficulty.  Has been dorian taping intermittently.    9/22/23: Nondisplaced fracture of proximal phalanx of left great toe, initial encounter for closed fracture        73-year-old female presenting to clinic today, past medical history significant for low bone mineral density on treatment, presenting with subacute injury to the left foot.  X-rays were obtained today and discussed with the patient in detail.  She does have ecchymosis as well as signs and symptoms suggestive of avulsion injury and nondisplaced injury to the proximal phalanx head neck region of the left great toe.  Have discussed this at length with patient.  At this time, have started her on dorian taping.  I do think some of this could be an old injury as of now, given the fact she did not have any acute injuries except for mild fall, mechanical, several days ago.  That fall did not involve her foot per her though, she just did not have a chance to bring up the left foot on her previous visit here at the sports medicine center with Dr. Hopkins, as her right foot was more bothersome.  In addition to the dorian taping, we have provided the patient with a postop shoe.  Given her multiple falls, and bilateral neuropathy, I do think a cam boot will lead to ambulation difficulties and increase her risk for adverse events.  We will see the patient back in 3 weeks with new x-rays and hopeful removing of restrictions.  Return precautions  and the ER/urgent care precautions advised.     Options for treatment and/or follow-up care were     Study Result    Narrative & Impression   EXAM: XR TOE LEFT G/E 2 VIEWS  9/22/2023 2:00 PM       HISTORY: Injury of left toe     COMPARISON: 7/20/2023     FINDINGS: 3 views of the left great toe.      Subtle nondisplaced fracture through the fibular condyle of the head  of the first proximal phalanx. Cystic changes in the medial first  metatarsal head and base of the second proximal phalanx. Mild first  metatarsophalangeal joint degenerative change. Soft tissues  unremarkable.                                                                       IMPRESSION: Subtle nondisplaced fracture through the fibular condyle  of the head of the first proximal phalanx.          PMH, Medications and Allergies were reviewed and updated as needed.    ROS:  As noted above otherwise negative.    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    Corneal opacity    Hypothyroidism    Osteopenia, unspecified location    Aromatase inhibitor use    Chronic pain of right knee    DDD (degenerative disc disease), lumbar    Degenerative scoliosis in adult patient    Carpal tunnel syndrome of right wrist    Peripheral neuropathy    Spinal stenosis of lumbar region with neurogenic claudication    Spondylolisthesis of lumbar region    Brain lesion    Dermatochalasis of both upper eyelids    S/P spinal fusion    Chronic bilateral low back pain    PVD (posterior vitreous  detachment), left eye    Vitreous opacities of left eye    Closed nondisplaced fracture of lateral malleolus of left fibula, initial encounter    Acute left ankle pain    Sacroiliitis (H24)    Lumbar radiculopathy    Lumbosacral radiculopathy       Current Outpatient Medications   Medication Sig Dispense Refill    acetaminophen (TYLENOL) 500 MG tablet Take 500-1,000 mg by mouth every 6 hours as needed for mild pain      amLODIPine (NORVASC) 10 MG tablet Take 1 tablet (10 mg) by mouth daily 90 tablet 3    Ascorbic Acid (VITAMIN C) 500 MG CHEW Take 1 tablet by mouth daily      atorvastatin (LIPITOR) 80 MG tablet Take 1 tablet (80 mg) by mouth daily **Call clinic to schedule follow up LABS [phone] **NEEDED FOR FURTHER REFILLS** 90 tablet 0    biotin 1000 MCG TABS tablet Take 1,000 mcg by mouth daily      Cholecalciferol (VITAMIN D3) 50 MCG (2000 UT) CAPS TAKE 3 CAPSULES (6,000 UNITS) BY MOUTH DAILY. 270 capsule 3    CRANBERRY EXTRACT PO Take 650 mg by mouth daily ~10 am  Takes 1      diclofenac (VOLTAREN) 1 % topical gel Apply to toe or other joints painful 100 g 3    gabapentin (NEURONTIN) 300 MG capsule Take 4 capsules (1,200 mg) by mouth 3 times daily 1080 capsule 3    HEMP OIL OR EXTRACT OR OTHER CBD CANNABINOID, NOT MEDICAL CANNABIS, THC      HYDROcodone-acetaminophen (NORCO) 5-325 MG tablet Take 1 tab for breakthrough pain 1-2 times a week. 30 tablet 0    hydrOXYzine (VISTARIL) 25 MG capsule TAKE 1 CAPSULE BY MOUTH AT BEDTIME. 90 capsule 2    levothyroxine (SYNTHROID/LEVOTHROID) 112 MCG tablet TAKE 1/2 TABLET BY MOUTH EVERY DAY **Call clinic to schedule follow up LABS [phone] **NEEDED FOR FURTHER REFILLS** 45 tablet 0    lisinopril (ZESTRIL) 40 MG tablet Take 1 tablet (40 mg) by mouth daily 90 tablet 3    magnesium 250 MG tablet Take 1 tablet by mouth daily      melatonin 5 MG tablet Take 10 mg by mouth At Bedtime       methocarbamol (ROBAXIN) 500 MG tablet Take 1 tablet (500 mg) by mouth 3 times daily  40 tablet 4    metoprolol succinate ER (TOPROL XL) 50 MG 24 hr tablet Take 1 tablet (50 mg) by mouth daily 90 tablet 1    Multiple Vitamin (MULTI-VITAMIN) per tablet Take 1 tablet by mouth daily      naloxone (NARCAN) 4 MG/0.1ML nasal spray Spray 1 spray (4 mg) into one nostril alternating nostrils once as needed for opioid reversal every 2-3 minutes until assistance arrives 0.2 mL 0    ondansetron (ZOFRAN-ODT) 4 MG ODT tab Take 1 tablet (4 mg) by mouth every 12 hours as needed for nausea 180 tablet 3    spironolactone (ALDACTONE) 25 MG tablet Take 2 tablets (50 mg) by mouth daily 180 tablet 1    tiZANidine (ZANAFLEX) 4 MG tablet TAKE 0.5-1 TABLET BY MOUTH 3 TIMES DAILY 60 tablet 0    Vaginal Lubricant (REPHRESH) GEL Place 2 g vaginally every 3 days 2 g 3            OBJECTIVE:       Vitals:   Vitals:    10/20/23 1507   Weight: 88 kg (194 lb)     BMI: Body mass index is 31.79 kg/m .    Gen:  Well nourished and in no acute distress  HEENT: Extraocular movement intact  Neck: Supple  Pulm:  Breathing Comfortably. No increased respiratory effort.  Psych: Euthymic. Appropriately answers questions    MSK: Left great toe without evidence of any erythema or ecchymosis.  No rotational deformities.  Mild tenderness to palpation on the medial aspect of the distal phalanx.  Good range of motion however.      XRAY : Independent evaluation of the left foot x-ray does show similar fibular region condyle fracture of the distal phalanx.  Interval healing.  Same angulation.  Same alignment.       ASSESSMENT and PLAN:     Ismael was seen today for recheck.    Diagnoses and all orders for this visit:    Injury of toe on left foot, subsequent encounter      73-year-old female presenting to clinic today for follow-up of the left great toe fracture.  She is overall doing well with buddy taping.  At this point, she does have some mild tenderness that is residual at this point.  I do think she could benefit from another 2 to 3 weeks of buddy  taping.  We have provided her with a new roll today.  After that, good protective shoes with a wide box.  Did discuss fall avoidance and fall precautions.  Patient at this point can follow-up as needed.  ER and urgent care precautions have been advised.    Options for treatment and/or follow-up care were reviewed with the patient was actively involved in the decision making process. Patient verbalized understanding and was in agreement with the plan.    Rolan Morgan DO  , Sports Medicine  Department of Family Medicine and [facility]

## 2023-10-20 NOTE — TELEPHONE ENCOUNTER
atorvastatin (LIPITOR) 80 MG tablet 90 tablet 3 10/27/2022     Last Office Visit : 3/30/2023   Future Office visit:  none    Routing refill request to provider for review/approval because:  Ldl overdue, please order labs. 90 day bo refill given.  LDL Cholesterol Calculated   Date Value Ref Range Status   04/15/2022 71 <=100 mg/dL Final   05/07/2021 111 (H) <100 mg/dL Final     Comment:     Above desirable:  100-129 mg/dl  Borderline High:  130-159 mg/dL  High:             160-189 mg/dL  Very high:       >189 mg/dl             levothyroxine (SYNTHROID/LEVOTHROID) 112 MCG tablet 45 tablet 3 10/27/2022     Last Office Visit : 3/30/2023   Future Office visit:  none    Routing refill request to provider for review/approval because:  TSH recheck due this month, please order labs. 90 day refill given.  TSH   Date Value Ref Range Status   10/27/2022 2.74 0.30 - 4.20 uIU/mL Final   01/12/2022 1.54 0.40 - 4.00 mU/L Final   11/11/2019 1.91 0.40 - 4.00 mU/L Final

## 2023-10-20 NOTE — PROGRESS NOTES
[facility]  Sports Medicine Clinic  Clinics and Surgery Center           SUBJECTIVE       Nadira Perez is a 73 year old female presenting to clinic today for left toe return.  Patient is overall doing very well.  No pain.  Is ambulating without difficulty.  Has been dorian taping intermittently.    9/22/23: Nondisplaced fracture of proximal phalanx of left great toe, initial encounter for closed fracture        73-year-old female presenting to clinic today, past medical history significant for low bone mineral density on treatment, presenting with subacute injury to the left foot.  X-rays were obtained today and discussed with the patient in detail.  She does have ecchymosis as well as signs and symptoms suggestive of avulsion injury and nondisplaced injury to the proximal phalanx head neck region of the left great toe.  Have discussed this at length with patient.  At this time, have started her on dorian taping.  I do think some of this could be an old injury as of now, given the fact she did not have any acute injuries except for mild fall, mechanical, several days ago.  That fall did not involve her foot per her though, she just did not have a chance to bring up the left foot on her previous visit here at the sports medicine center with Dr. Hopkins, as her right foot was more bothersome.  In addition to the dorain taping, we have provided the patient with a postop shoe.  Given her multiple falls, and bilateral neuropathy, I do think a cam boot will lead to ambulation difficulties and increase her risk for adverse events.  We will see the patient back in 3 weeks with new x-rays and hopeful removing of restrictions.  Return precautions and the ER/urgent care precautions advised.     Options for treatment and/or follow-up care were     Study Result    Narrative & Impression   EXAM: XR TOE LEFT G/E 2 VIEWS  9/22/2023 2:00 PM       HISTORY: Injury of left toe     COMPARISON: 7/20/2023     FINDINGS: 3  views of the left great toe.      Subtle nondisplaced fracture through the fibular condyle of the head  of the first proximal phalanx. Cystic changes in the medial first  metatarsal head and base of the second proximal phalanx. Mild first  metatarsophalangeal joint degenerative change. Soft tissues  unremarkable.                                                                       IMPRESSION: Subtle nondisplaced fracture through the fibular condyle  of the head of the first proximal phalanx.          PMH, Medications and Allergies were reviewed and updated as needed.    ROS:  As noted above otherwise negative.    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    Corneal opacity    Hypothyroidism    Osteopenia, unspecified location    Aromatase inhibitor use    Chronic pain of right knee    DDD (degenerative disc disease), lumbar    Degenerative scoliosis in adult patient    Carpal tunnel syndrome of right wrist    Peripheral neuropathy    Spinal stenosis of lumbar region with neurogenic claudication    Spondylolisthesis of lumbar region    Brain lesion    Dermatochalasis of both upper eyelids    S/P spinal fusion    Chronic bilateral low back pain    PVD (posterior vitreous detachment), left eye    Vitreous opacities of left eye    Closed nondisplaced fracture of lateral malleolus of left fibula, initial encounter    Acute left ankle pain    Sacroiliitis (H24)    Lumbar radiculopathy    Lumbosacral radiculopathy       Current  Outpatient Medications   Medication Sig Dispense Refill    acetaminophen (TYLENOL) 500 MG tablet Take 500-1,000 mg by mouth every 6 hours as needed for mild pain      amLODIPine (NORVASC) 10 MG tablet Take 1 tablet (10 mg) by mouth daily 90 tablet 3    Ascorbic Acid (VITAMIN C) 500 MG CHEW Take 1 tablet by mouth daily      atorvastatin (LIPITOR) 80 MG tablet Take 1 tablet (80 mg) by mouth daily **Call clinic to schedule follow up LABS [phone] **NEEDED FOR FURTHER REFILLS** 90 tablet 0    biotin 1000 MCG TABS tablet Take 1,000 mcg by mouth daily      Cholecalciferol (VITAMIN D3) 50 MCG (2000 UT) CAPS TAKE 3 CAPSULES (6,000 UNITS) BY MOUTH DAILY. 270 capsule 3    CRANBERRY EXTRACT PO Take 650 mg by mouth daily ~10 am  Takes 1      diclofenac (VOLTAREN) 1 % topical gel Apply to toe or other joints painful 100 g 3    gabapentin (NEURONTIN) 300 MG capsule Take 4 capsules (1,200 mg) by mouth 3 times daily 1080 capsule 3    HEMP OIL OR EXTRACT OR OTHER CBD CANNABINOID, NOT MEDICAL CANNABIS, THC      HYDROcodone-acetaminophen (NORCO) 5-325 MG tablet Take 1 tab for breakthrough pain 1-2 times a week. 30 tablet 0    hydrOXYzine (VISTARIL) 25 MG capsule TAKE 1 CAPSULE BY MOUTH AT BEDTIME. 90 capsule 2    levothyroxine (SYNTHROID/LEVOTHROID) 112 MCG tablet TAKE 1/2 TABLET BY MOUTH EVERY DAY **Call clinic to schedule follow up LABS [phone] **NEEDED FOR FURTHER REFILLS** 45 tablet 0    lisinopril (ZESTRIL) 40 MG tablet Take 1 tablet (40 mg) by mouth daily 90 tablet 3    magnesium 250 MG tablet Take 1 tablet by mouth daily      melatonin 5 MG tablet Take 10 mg by mouth At Bedtime       methocarbamol (ROBAXIN) 500 MG tablet Take 1 tablet (500 mg) by mouth 3 times daily 40 tablet 4    metoprolol succinate ER (TOPROL XL) 50 MG 24 hr tablet Take 1 tablet (50 mg) by mouth daily 90 tablet 1    Multiple Vitamin (MULTI-VITAMIN) per tablet Take 1 tablet by mouth daily      naloxone (NARCAN) 4 MG/0.1ML nasal spray Spray 1 spray (4  mg) into one nostril alternating nostrils once as needed for opioid reversal every 2-3 minutes until assistance arrives 0.2 mL 0    ondansetron (ZOFRAN-ODT) 4 MG ODT tab Take 1 tablet (4 mg) by mouth every 12 hours as needed for nausea 180 tablet 3    spironolactone (ALDACTONE) 25 MG tablet Take 2 tablets (50 mg) by mouth daily 180 tablet 1    tiZANidine (ZANAFLEX) 4 MG tablet TAKE 0.5-1 TABLET BY MOUTH 3 TIMES DAILY 60 tablet 0    Vaginal Lubricant (REPHRESH) GEL Place 2 g vaginally every 3 days 2 g 3            OBJECTIVE:       Vitals:   Vitals:    10/20/23 1507   Weight: 88 kg (194 lb)     BMI: Body mass index is 31.79 kg/m .    Gen:  Well nourished and in no acute distress  HEENT: Extraocular movement intact  Neck: Supple  Pulm:  Breathing Comfortably. No increased respiratory effort.  Psych: Euthymic. Appropriately answers questions    MSK: Left great toe without evidence of any erythema or ecchymosis.  No rotational deformities.  Mild tenderness to palpation on the medial aspect of the distal phalanx.  Good range of motion however.      XRAY : Independent evaluation of the left foot x-ray does show similar fibular region condyle fracture of the distal phalanx.  Interval healing.  Same angulation.  Same alignment.       ASSESSMENT and PLAN:     Ismael was seen today for recheck.    Diagnoses and all orders for this visit:    Injury of toe on left foot, subsequent encounter      73-year-old female presenting to clinic today for follow-up of the left great toe fracture.  She is overall doing well with buddy taping.  At this point, she does have some mild tenderness that is residual at this point.  I do think she could benefit from another 2 to 3 weeks of buddy taping.  We have provided her with a new roll today.  After that, good protective shoes with a wide box.  Did discuss fall avoidance and fall precautions.  Patient at this point can follow-up as needed.  ER and urgent care precautions have been  advised.    Options for treatment and/or follow-up care were reviewed with the patient was actively involved in the decision making process. Patient verbalized understanding and was in agreement with the plan.    Rolan Morgan DO  , Sports Medicine  Department of Family Medicine and [facility]

## 2023-10-26 NOTE — PROGRESS NOTES
CANCER SURVIVORSHIP VISIT  Nov 2, 2023    Care Team   Primary Care Provider Matilde Quiñonez   Surgeon  Mauro Mei MD   Radiation Oncologist Nghia Burch MD   Medical Oncologist  Martha William MD, Khushbu Louise MD       REASON FOR VISIT: survivorship visit for history of breast cancer    CANCER HISTORY AND TREATMENT:    History of left-sided stage IIIC inflammatory breast cancer, ER/TX positive, HER2 negative, diagnosed in 2007.  *Diagnosis: 6/13/2007. 57 years old. Presented with left-sided breast discomfort which extended into the axilla. Clinical stage IIIC (T4d N3 M0), infiltrating ductal carcinoma, ER/TX positive, HER2-oneida negative. She had skin and areolar complex thickening consistent with inflammatory breast cancer.   *Neoadjuvant chemotherapy: She received FEC (Fluorouracil, Epirubicin, Cyclosphamide) for 3 cycles through 08/24/2007. This was followed by Taxotere for 3 cycles completed 10/26/2007. Estimated cumulative epirubicin dosing was 300 mg/m2 (900 mg/m2 is cumulative lifetime dose).   *Surgery: 11/20/2007 left-sided mastectomy with axillary lymph node dissection. 1 cm residual carcinoma with DCIS. 13/33 lymph nodes were positive, largest was 0.6 cm with extracapsular extension.   *Radiation: She received radiation to the left chest wall at a dose of 6440 cGy, to the left supraclavicular fossa at a dose of 5040 cGy, and to the left internal mammary chain at a dose of 5080 cGy.  Last dose of radiation was administered on 03/07/2008.         *Endocrine therapy: Arimidex 11//2007-11/2012, Tamoxifen 11/2012-11/2017, Arimidex 11/2017-11/2021. 14 years of endocrine therapy.   *Genetic testing: no  *Other information: s/p right sided prophylactic mastectomy 11/20/2007. Zometa every 6 months from 11/2017-11/2021 (4 years).     Cardiovascular risk screen:  Age >60 at cancer treatment: no   History of EF<55%: no  History of MI: no  Anthracycline: yes   Radiotherapy to the left chest:  yes  History of trastuzumab: no  Smoking history: no  Hypertension: yes  Hyperlididemia: yes  Diabetes: no  Obesity: no    INTERVAL HISTORY:     Ismael presents for a cancer survivorship follow up visit.     Concerns/needs today:  -Would like referral back to lymphedema PT and would also like to work on ROS on the left.   -Had bradycardia in 40's-50's and dizziness. Would like referral to cardio-oncology    She is happy with the referral to Dr. Centeno in PM&M for neuropathy and rehab needs.     Continues to have chronic low back pain and had back surgery a few years back. She had a CT of her lumbar spine 5/2022 negative for metastatic disease. This issue affects her QOL. She has had an epidural and meets again soon with Dr. Umaña in pain clinic.     She has a hiatal hernia and plans to talk to Matilde about this at her upcoming preventative visit. [facility] reports GERD sx and we discussed treating this and lifestyle changes but she would like this re-evaluated first. She also has edema of her right leg in the last 2 months since wearing the brace. No redness or calf pain.     No new lumps/bumps or new pain.     Mood: no concerns    Sexual health: vaginal dryness, refresh helps    Discussed [employer] new 3D printing for breast forms.       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    Thyroid disease        Current Outpatient Medications   Medication Sig Dispense Refill    acetaminophen (TYLENOL) 500 MG tablet Take 500-1,000 mg by mouth every 6 hours as needed for mild pain      amLODIPine (NORVASC) 10 MG  tablet Take 1 tablet (10 mg) by mouth daily 90 tablet 3    Ascorbic Acid (VITAMIN C) 500 MG CHEW Take 1 tablet by mouth daily      atorvastatin (LIPITOR) 80 MG tablet Take 1 tablet (80 mg) by mouth daily **Call clinic to schedule follow up LABS [phone] **NEEDED FOR FURTHER REFILLS** 90 tablet 0    biotin 1000 MCG TABS tablet Take 1,000 mcg by mouth daily      Cholecalciferol (VITAMIN D3) 50 MCG (2000 UT) CAPS TAKE 3 CAPSULES (6,000 UNITS) BY MOUTH DAILY. 270 capsule 3    CRANBERRY EXTRACT PO Take 650 mg by mouth daily ~10 am  Takes 1      diclofenac (VOLTAREN) 1 % topical gel Apply to toe or other joints painful 100 g 3    gabapentin (NEURONTIN) 300 MG capsule Take 4 capsules (1,200 mg) by mouth 3 times daily 1080 capsule 3    HEMP OIL OR EXTRACT OR OTHER CBD CANNABINOID, NOT MEDICAL CANNABIS, THC      HYDROcodone-acetaminophen (NORCO) 5-325 MG tablet Take 1 tab for breakthrough pain 1-2 times a week. 30 tablet 0    hydrOXYzine (VISTARIL) 25 MG capsule TAKE 1 CAPSULE BY MOUTH AT BEDTIME. 90 capsule 2    levothyroxine (SYNTHROID/LEVOTHROID) 112 MCG tablet TAKE 1/2 TABLET BY MOUTH EVERY DAY **Call clinic to schedule follow up LABS [phone] **NEEDED FOR FURTHER REFILLS** 45 tablet 0    lisinopril (ZESTRIL) 40 MG tablet Take 1 tablet (40 mg) by mouth daily 90 tablet 3    magnesium 250 MG tablet Take 1 tablet by mouth daily      melatonin 5 MG tablet Take 10 mg by mouth At Bedtime       methocarbamol (ROBAXIN) 500 MG tablet Take 1 tablet (500 mg) by mouth 3 times daily 40 tablet 4    metoprolol succinate ER (TOPROL XL) 50 MG 24 hr tablet Take 1 tablet (50 mg) by mouth daily 90 tablet 1    Multiple Vitamin (MULTI-VITAMIN) per tablet Take 1 tablet by mouth daily      naloxone (NARCAN) 4 MG/0.1ML nasal spray Spray 1 spray (4 mg) into one nostril alternating nostrils once as needed for opioid reversal every 2-3 minutes until assistance arrives 0.2 mL 0    ondansetron (ZOFRAN-ODT) 4 MG ODT tab Take 1 tablet (4 mg) by  mouth every 12 hours as needed for nausea 180 tablet 3    spironolactone (ALDACTONE) 25 MG tablet Take 2 tablets (50 mg) by mouth daily 180 tablet 1    tiZANidine (ZANAFLEX) 4 MG tablet TAKE 0.5-1 TABLET BY MOUTH 3 TIMES DAILY 60 tablet 0    Vaginal Lubricant (REPHRESH) GEL Place 2 g vaginally every 3 days 2 g 3          Allergies   Allergen Reactions    Erythromycin Nausea    Penicillins Hives     Around age 6 - doesn't recall full reaction, mother told her it was hives.     Has tolerated cephalsporins.        Family History   Problem Relation Age of Onset    Neurologic Disorder Mother         Anuerysm of Cerebral Artery, Dementia    Diabetes Mother     Thyroid Disease Mother         ,    Cerebrovascular Disease Mother     Dementia Mother     Osteoporosis Mother     Heart Disease Father         AAA    Hypertension Father     Circulatory Brother         Perihperal Neurophathy    Diabetes Maternal Grandmother     Asthma Maternal Grandmother     Chronic Obstructive Pulmonary Disease Maternal Grandfather         father    Asthma Maternal Grandfather     Diabetes Maternal Aunt         x2    Melanoma Maternal Aunt     Glaucoma Maternal Aunt     Breast Cancer Cousin     Dementia Other     Cancer Other         malignant melanoma    Hypertension Other     Hypertension Other     Cerebrovascular Disease Other     Cerebrovascular Disease Other     Obesity Other     Respiratory Other     Cancer Other     Diabetes Other     Asthma Other     Macular Degeneration No family hx of     Coronary Artery Disease No family hx of     Hyperlipidemia No family hx of     Kidney Disease No family hx of     Thrombosis No family hx of     Arthritis No family hx of     Depression No family hx of     Mental Illness No family hx of     Substance Abuse No family hx of     Cystic Fibrosis No family hx of     Early Death No family hx of     Coronary Artery Disease Early Onset No family hx of     Heart Failure No family hx of     Bleeding Diathesis No  family hx of     Ovarian Cancer No family hx of     Uterine Cancer No family hx of     Prostate Cancer No family hx of     Colorectal Cancer No family hx of     Pancreatic Cancer No family hx of     Lung Cancer No family hx of     Other Cancer No family hx of     Autoimmune Disease No family hx of     Unknown/Adopted No family hx of     Genetic Disorder No family hx of     Bleeding Disorder No family hx of     Clotting Disorder No family hx of     Anesthesia Reaction No family hx of         Social history:  Living situation: , lives in [place].   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    HEENT: Sclera anicteric.   Lymph: No lymphadenopathy in neck, supraclavicular, and axillary areas  Heart: RRR. Occasional extra beat  Lungs: Clear to ascultation bilaterally  Chest: Bilateral mastectomy without reconstruction  Extremities: Wears brace on right lower leg with 2+ edema on right and 1+ on left  Skin: No rash on exposed skin  Neuro: Cranial nerves grossly intact    IMPRESSION/PLAN:    History of left-sided stage IIIC inflammatory breast cancer, ER/MT positive, HER2 negative, diagnosed in 2007.  Treated with neoadjuvant chemo, bilateral mastectomy (residual disease), radiation, and 14 years of endocrine therapy.   16 years out without evidence of recurrence.   No routine surveillance scans or  tumor markers indicated but I have a low threshold to evaluate new complaints.   Annual cancer survivorship visit until she is 20 years out then prn.     Lymphedema: Ongoing issue. She would like a referral back to PT.     Peripheral neuropathy: precedes cancer diagnosis. She sees Dr. Centeno PM&R for her rehab needs    Potential late effects related to chemotherapy:  -Cardiotoxicity. Given her exposure to an anthracycline and radiation including the left chest, she may be at risk for cardiomyopathy, arrhythmias, and left ventricular dysfunction. A post-treatment echo has been performed in 2019 (EF 61%).  She should continue to follow-up routinely with her PCP for lipid testing, and monitoring of blood pressure and blood glucose, with treatment as indicated.  -Referral to cardio-oncology for her new bradycardia and dizziness.     Potential late effects related to radiation:  - Possible radiation side effects include cardiotoxicity, lung injury, fracture, and second malignancies. She should seek medical attention if she notices any new skin lesions in the area of radiation. If she should develop any shortness of breath, hemoptysis, cough, or new pain, I would advise further evaluation with CT or X-ray.     Potential effects related to endocrine therapy:  Risk of developing osteoporosis: Last DEXA 11/2021 with a lowest T-score of -1.4, Recommend weightbearing exercises 2-3 days per week, and to supplement with 1200 mg of calcium, 1000 international units of vitamin D daily. S/p fosamax and 4 years of IV bisphosphonate. She reports a history of Prolia and is now on Evenity managed by sports medicine.      General late effects screenings and recommendations    -Cancer screening. She should undergo routine screening for women in her age group.     -Healthy lifestyle. She should maintain a healthy weight with a BMI between 20-25. She should exercise at least 150 minutes weekly at moderate intensity. She should see the eye  doctor every 1-2 years, and dentist every 6 months for cleanings. She should not use any tobacco. She should minimize alcohol intake. If continuing to drink, should follow CDC recommendations for no more than 1 alcoholic drink/day for women.    Non-urgent scheduling should be complete within 7-10 business days. However, if you have not received a phone call from scheduling within 3 business days, you may call to schedule at [phone] option 5, option 2. This is the same number to call to make changes tor if you have questions about the schedule.    Gave resources for our [facility] Cancer Survivorship class series and mailings list for educational opportunities. [email or web address]    Cancer treatment summary was sent electronically to the patient and her PCP.      The total time of this encounter amounted to 60 minutes.This time included time spent with the patient, prep work, ordering tests, creating a cancer treatment summary, and performing post visit documentation.    MEERA Anaya, PA-C  [facility]

## 2023-10-29 ENCOUNTER — HEALTH MAINTENANCE LETTER (OUTPATIENT)
Age: 73
End: 2023-10-29

## 2023-11-02 ENCOUNTER — ONCOLOGY SURVIVORSHIP VISIT (OUTPATIENT)
Dept: ONCOLOGY | Facility: CLINIC | Age: 73
End: 2023-11-02
Attending: PHYSICIAN ASSISTANT
Payer: COMMERCIAL

## 2023-11-02 VITALS
BODY MASS INDEX: 30.97 KG/M2 | HEART RATE: 87 BPM | WEIGHT: 189 LBS | RESPIRATION RATE: 16 BRPM | DIASTOLIC BLOOD PRESSURE: 63 MMHG | SYSTOLIC BLOOD PRESSURE: 105 MMHG | OXYGEN SATURATION: 97 %

## 2023-11-02 DIAGNOSIS — Z91.89 AT RISK FOR CARDIOMYOPATHY: ICD-10-CM

## 2023-11-02 DIAGNOSIS — I89.0 LYMPHEDEMA: ICD-10-CM

## 2023-11-02 DIAGNOSIS — C50.919 MALIGNANT NEOPLASM OF BREAST IN FEMALE, ESTROGEN RECEPTOR POSITIVE, UNSPECIFIED LATERALITY, UNSPECIFIED SITE OF BREAST (H): Primary | ICD-10-CM

## 2023-11-02 DIAGNOSIS — R00.1 BRADYCARDIA: ICD-10-CM

## 2023-11-02 DIAGNOSIS — Z17.0 MALIGNANT NEOPLASM OF BREAST IN FEMALE, ESTROGEN RECEPTOR POSITIVE, UNSPECIFIED LATERALITY, UNSPECIFIED SITE OF BREAST (H): Primary | ICD-10-CM

## 2023-11-02 PROCEDURE — 99215 OFFICE O/P EST HI 40 MIN: CPT | Performed by: PHYSICIAN ASSISTANT

## 2023-11-02 ASSESSMENT — PAIN SCALES - GENERAL: PAINLEVEL: SEVERE PAIN (6)

## 2023-11-02 NOTE — PATIENT INSTRUCTIONS
Plan:  Follow-up in 1 year for cancer survivorship visit in person  Referral placed for lymphedema and cardio-oncology  Consider getting on the mailing list for our cancer survivorship program [email or web address]

## 2023-11-02 NOTE — NURSING NOTE
"Oncology Rooming Note    November 2, 2023 10:40 AM   Nadira Perez is a 73 year old female who presents for:    Chief Complaint   Patient presents with    Oncology Clinic Visit     Breast Ca     Initial Vitals: /63   Pulse 87   Resp 16   Wt 85.7 kg (189 lb)   SpO2 97%   BMI 30.97 kg/m   Estimated body mass index is 30.97 kg/m  as calculated from the following:    Height as of 7/11/23: 1.664 m (5' 5.5\").    Weight as of this encounter: 85.7 kg (189 lb). Body surface area is 1.99 meters squared.  Severe Pain (6) Comment: Data Unavailable   No LMP recorded. Patient has had a hysterectomy.  Allergies reviewed: Yes  Medications reviewed: Yes    Medications: Medication refills not needed today.  Pharmacy name entered into [employer]:    [email or web address]  [facility] [id number] [place] [facility] [id number] [address]    Clinical concerns:        Evie Awad CMA              "

## 2023-11-02 NOTE — LETTER
11/2/2023         RE: Nadira Perez  [id number]  [facility] [id number]        Dear Colleague,    Thank you for referring your patient, Nadira Perez, to the [facility]. Please see a copy of my visit note below.      CANCER SURVIVORSHIP VISIT  Nov 2, 2023    Care Team   Primary Care Provider Matilde Quiñonez   Surgeon  Mauro Mei MD   Radiation Oncologist Nghia Burch MD   Medical Oncologist  Martha William MD, Khushbu Louise MD       REASON FOR VISIT: survivorship visit for history of breast cancer    CANCER HISTORY AND TREATMENT:    History of left-sided stage IIIC inflammatory breast cancer, ER/OH positive, HER2 negative, diagnosed in 2007.  *Diagnosis: 6/13/2007. 57 years old. Presented with left-sided breast discomfort which extended into the axilla. Clinical stage IIIC (T4d N3 M0), infiltrating ductal carcinoma, ER/OH positive, HER2-oneida negative. She had skin and areolar complex thickening consistent with inflammatory breast cancer.   *Neoadjuvant chemotherapy: She received FEC (Fluorouracil, Epirubicin, Cyclosphamide) for 3 cycles through 08/24/2007. This was followed by Taxotere for 3 cycles completed 10/26/2007. Estimated cumulative epirubicin dosing was 300 mg/m2 (900 mg/m2 is cumulative lifetime dose).   *Surgery: 11/20/2007 left-sided mastectomy with axillary lymph node dissection. 1 cm residual carcinoma with DCIS. 13/33 lymph nodes were positive, largest was 0.6 cm with extracapsular extension.   *Radiation: She received radiation to the left chest wall at a dose of 6440 cGy, to the left supraclavicular fossa at a dose of 5040 cGy, and to the left internal mammary chain at a dose of 5080 cGy.  Last dose of radiation was administered on 03/07/2008.         *Endocrine therapy: Arimidex 11//2007-11/2012, Tamoxifen 11/2012-11/2017, Arimidex 11/2017-11/2021. 14 years of endocrine therapy.   *Genetic testing: no  *Other information: s/p right  sided prophylactic mastectomy 11/20/2007. Zometa every 6 months from 11/2017-11/2021 (4 years).     Cardiovascular risk screen:  Age >60 at cancer treatment: no   History of EF<55%: no  History of MI: no  Anthracycline: yes   Radiotherapy to the left chest: yes  History of trastuzumab: no  Smoking history: no  Hypertension: yes  Hyperlididemia: yes  Diabetes: no  Obesity: no    INTERVAL HISTORY:     Ismael presents for a cancer survivorship follow up visit.     Concerns/needs today:  -Would like referral back to lymphedema PT and would also like to work on ROS on the left.   -Had bradycardia in 40's-50's and dizziness. Would like referral to cardio-oncology    She is happy with the referral to Dr. Centeno in PM&M for neuropathy and rehab needs.     Continues to have chronic low back pain and had back surgery a few years back. She had a CT of her lumbar spine 5/2022 negative for metastatic disease. This issue affects her QOL. She has had an epidural and meets again soon with Dr. Umaña in pain clinic.     She has a hiatal hernia and plans to talk to Matilde about this at her upcoming preventative visit. [facility] reports GERD sx and we discussed treating this and lifestyle changes but she would like this re-evaluated first. She also has edema of her right leg in the last 2 months since wearing the brace. No redness or calf pain.     No new lumps/bumps or new pain.     Mood: no concerns    Sexual health: vaginal dryness, refresh helps    Discussed [employer] new 3D printing for breast forms.       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     Thyroid disease        Current Outpatient Medications   Medication Sig Dispense Refill     acetaminophen (TYLENOL) 500 MG tablet Take 500-1,000 mg by mouth every 6 hours as needed for mild pain       amLODIPine (NORVASC) 10 MG tablet Take 1 tablet (10 mg) by mouth daily 90 tablet 3     Ascorbic Acid (VITAMIN C) 500 MG CHEW Take 1 tablet by mouth daily       atorvastatin (LIPITOR) 80 MG tablet Take 1 tablet (80 mg) by mouth daily **Call clinic to schedule follow up LABS [phone] **NEEDED FOR FURTHER REFILLS** 90 tablet 0     biotin 1000 MCG TABS tablet Take 1,000 mcg by mouth daily       Cholecalciferol (VITAMIN D3) 50 MCG (2000 UT) CAPS TAKE 3 CAPSULES (6,000 UNITS) BY MOUTH DAILY. 270 capsule 3     CRANBERRY EXTRACT PO Take 650 mg by mouth daily ~10 am  Takes 1       diclofenac (VOLTAREN) 1 % topical gel Apply to toe or other joints painful 100 g 3     gabapentin (NEURONTIN) 300 MG capsule Take 4 capsules (1,200 mg) by mouth 3 times daily 1080 capsule 3     HEMP OIL OR EXTRACT OR OTHER CBD CANNABINOID, NOT MEDICAL CANNABIS, THC       HYDROcodone-acetaminophen (NORCO) 5-325 MG tablet Take 1 tab for breakthrough pain 1-2 times a week. 30 tablet 0     hydrOXYzine (VISTARIL) 25 MG capsule TAKE 1 CAPSULE BY MOUTH AT BEDTIME. 90 capsule 2     levothyroxine (SYNTHROID/LEVOTHROID) 112 MCG tablet TAKE 1/2 TABLET BY MOUTH EVERY DAY **Call clinic to schedule follow up LABS [phone] **NEEDED FOR FURTHER REFILLS** 45 tablet 0     lisinopril (ZESTRIL) 40 MG tablet Take 1 tablet (40 mg) by mouth daily 90 tablet 3     magnesium 250 MG tablet Take 1 tablet by mouth daily       melatonin 5 MG tablet Take 10 mg by mouth At Bedtime        methocarbamol (ROBAXIN) 500 MG tablet Take 1 tablet (500 mg) by mouth 3 times daily 40 tablet 4     metoprolol succinate ER (TOPROL XL) 50 MG 24 hr tablet Take 1 tablet (50 mg) by mouth daily 90 tablet 1     Multiple Vitamin (MULTI-VITAMIN) per  tablet Take 1 tablet by mouth daily       naloxone (NARCAN) 4 MG/0.1ML nasal spray Spray 1 spray (4 mg) into one nostril alternating nostrils once as needed for opioid reversal every 2-3 minutes until assistance arrives 0.2 mL 0     ondansetron (ZOFRAN-ODT) 4 MG ODT tab Take 1 tablet (4 mg) by mouth every 12 hours as needed for nausea 180 tablet 3     spironolactone (ALDACTONE) 25 MG tablet Take 2 tablets (50 mg) by mouth daily 180 tablet 1     tiZANidine (ZANAFLEX) 4 MG tablet TAKE 0.5-1 TABLET BY MOUTH 3 TIMES DAILY 60 tablet 0     Vaginal Lubricant (REPHRESH) GEL Place 2 g vaginally every 3 days 2 g 3          Allergies   Allergen Reactions     Erythromycin Nausea     Penicillins Hives     Around age 6 - doesn't recall full reaction, mother told her it was hives.     Has tolerated cephalsporins.        Family History   Problem Relation Age of Onset     Neurologic Disorder Mother         Anuerysm of Cerebral Artery, Dementia     Diabetes Mother      Thyroid Disease Mother         ,     Cerebrovascular Disease Mother      Dementia Mother      Osteoporosis Mother      Heart Disease Father         AAA     Hypertension Father      Circulatory Brother         Perihperal Neurophathy     Diabetes Maternal Grandmother      Asthma Maternal Grandmother      Chronic Obstructive Pulmonary Disease Maternal Grandfather         father     Asthma Maternal Grandfather      Diabetes Maternal Aunt         x2     Melanoma Maternal Aunt      Glaucoma Maternal Aunt      Breast Cancer Cousin      Dementia Other      Cancer Other         malignant melanoma     Hypertension Other      Hypertension Other      Cerebrovascular Disease Other      Cerebrovascular Disease Other      Obesity Other      Respiratory Other      Cancer Other      Diabetes Other      Asthma Other      Macular Degeneration No family hx of      Coronary Artery Disease No family hx of      Hyperlipidemia No family hx of      Kidney Disease No family hx of       Thrombosis No family hx of      Arthritis No family hx of      Depression No family hx of      Mental Illness No family hx of      Substance Abuse No family hx of      Cystic Fibrosis No family hx of      Early Death No family hx of      Coronary Artery Disease Early Onset No family hx of      Heart Failure No family hx of      Bleeding Diathesis No family hx of      Ovarian Cancer No family hx of      Uterine Cancer No family hx of      Prostate Cancer No family hx of      Colorectal Cancer No family hx of      Pancreatic Cancer No family hx of      Lung Cancer No family hx of      Other Cancer No family hx of      Autoimmune Disease No family hx of      Unknown/Adopted No family hx of      Genetic Disorder No family hx of      Bleeding Disorder No family hx of      Clotting Disorder No family hx of      Anesthesia Reaction No family hx of         Social history:  Living situation: , lives in [place].   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    HEENT: Sclera anicteric.   Lymph: No lymphadenopathy in neck, supraclavicular, and axillary areas  Heart: RRR. Occasional extra beat  Lungs: Clear to ascultation bilaterally  Chest: Bilateral mastectomy without reconstruction  Extremities: Wears brace on right lower leg with 2+ edema on right and 1+ on left  Skin: No rash on exposed skin  Neuro:  Cranial nerves grossly intact    IMPRESSION/PLAN:    History of left-sided stage IIIC inflammatory breast cancer, ER/NC positive, HER2 negative, diagnosed in 2007.  Treated with neoadjuvant chemo, bilateral mastectomy (residual disease), radiation, and 14 years of endocrine therapy.   16 years out without evidence of recurrence.   No routine surveillance scans or tumor markers indicated but I have a low threshold to evaluate new complaints.   Annual cancer survivorship visit until she is 20 years out then prn.     Lymphedema: Ongoing issue. She would like a referral back to PT.     Peripheral neuropathy: precedes cancer diagnosis. She sees Dr. Centeno PM&R for her rehab needs    Potential late effects related to chemotherapy:  -Cardiotoxicity. Given her exposure to an anthracycline and radiation including the left chest, she may be at risk for cardiomyopathy, arrhythmias, and left ventricular dysfunction. A post-treatment echo has been performed in 2019 (EF 61%).  She should continue to follow-up routinely with her PCP for lipid testing, and monitoring of blood pressure and blood glucose, with treatment as indicated.  -Referral to cardio-oncology for her new bradycardia and dizziness.     Potential late effects related to radiation:  - Possible radiation side effects include cardiotoxicity, lung injury, fracture, and second malignancies. She should seek medical attention if she notices any new skin lesions in the area of radiation. If she should develop any shortness of breath, hemoptysis, cough, or new pain, I would advise further evaluation with CT or X-ray.     Potential effects related to endocrine therapy:  Risk of developing osteoporosis: Last DEXA 11/2021 with a lowest T-score of -1.4, Recommend weightbearing exercises 2-3 days per week, and to supplement with 1200 mg of calcium, 1000 international units of vitamin D daily. S/p fosamax and 4 years of IV bisphosphonate. She reports a history of Prolia and is now  on Evenity managed by sports medicine.      General late effects screenings and recommendations    -Cancer screening. She should undergo routine screening for women in her age group.     -Healthy lifestyle. She should maintain a healthy weight with a BMI between 20-25. She should exercise at least 150 minutes weekly at moderate intensity. She should see the eye doctor every 1-2 years, and dentist every 6 months for cleanings. She should not use any tobacco. She should minimize alcohol intake. If continuing to drink, should follow CDC recommendations for no more than 1 alcoholic drink/day for women.    Non-urgent scheduling should be complete within 7-10 business days. However, if you have not received a phone call from scheduling within 3 business days, you may call to schedule at [phone] option 5, option 2. This is the same number to call to make changes tor if you have questions about the schedule.    Gave resources for our [facility] Cancer Survivorship class series and mailings list for educational opportunities. [email or web address]    Cancer treatment summary was sent electronically to the patient and her PCP.      The total time of this encounter amounted to 60 minutes.This time included time spent with the patient, prep work, ordering tests, creating a cancer treatment summary, and performing post visit documentation.    MEERA Anaya, SAIDA  [facility] Cancer Survivorship [facility]    Again, thank you for allowing me to participate in the care of your patient.        Sincerely,        Ashley Perry PA-C

## 2023-11-05 DIAGNOSIS — I10 BENIGN ESSENTIAL HYPERTENSION: ICD-10-CM

## 2023-11-05 DIAGNOSIS — S93.512A SPRAIN OF INTERPHALANGEAL JOINT OF LEFT GREAT TOE, INITIAL ENCOUNTER: ICD-10-CM

## 2023-11-06 RX ORDER — AMLODIPINE BESYLATE 10 MG/1
10 TABLET ORAL DAILY
Qty: 90 TABLET | Refills: 3 | OUTPATIENT
Start: 2023-11-06

## 2023-11-06 RX ORDER — AMLODIPINE BESYLATE 10 MG/1
10 TABLET ORAL DAILY
Qty: 90 TABLET | Refills: 1 | Status: SHIPPED | OUTPATIENT
Start: 2023-11-06 | End: 2023-11-30

## 2023-11-08 ENCOUNTER — MYC MEDICAL ADVICE (OUTPATIENT)
Dept: ORTHOPEDICS | Facility: CLINIC | Age: 73
End: 2023-11-08
Payer: COMMERCIAL

## 2023-11-09 ENCOUNTER — OFFICE VISIT (OUTPATIENT)
Dept: INTERNAL MEDICINE | Facility: CLINIC | Age: 73
End: 2023-11-09
Payer: COMMERCIAL

## 2023-11-09 ENCOUNTER — LAB (OUTPATIENT)
Dept: LAB | Facility: CLINIC | Age: 73
End: 2023-11-09
Payer: COMMERCIAL

## 2023-11-09 VITALS — OXYGEN SATURATION: 98 % | DIASTOLIC BLOOD PRESSURE: 85 MMHG | SYSTOLIC BLOOD PRESSURE: 134 MMHG | HEART RATE: 79 BPM

## 2023-11-09 DIAGNOSIS — R00.1 BRADYCARDIA: Primary | ICD-10-CM

## 2023-11-09 DIAGNOSIS — R00.1 BRADYCARDIA: ICD-10-CM

## 2023-11-09 DIAGNOSIS — Z29.11 NEED FOR VACCINATION AGAINST RESPIRATORY SYNCYTIAL VIRUS: ICD-10-CM

## 2023-11-09 LAB
ANION GAP SERPL CALCULATED.3IONS-SCNC: 11 MMOL/L (ref 7–15)
ATRIAL RATE - MUSE: 77 BPM
BUN SERPL-MCNC: 19.7 MG/DL (ref 8–23)
CALCIUM SERPL-MCNC: 10 MG/DL (ref 8.8–10.2)
CHLORIDE SERPL-SCNC: 106 MMOL/L (ref 98–107)
CREAT SERPL-MCNC: 0.77 MG/DL (ref 0.51–0.95)
DEPRECATED HCO3 PLAS-SCNC: 24 MMOL/L (ref 22–29)
DIASTOLIC BLOOD PRESSURE - MUSE: NORMAL MMHG
EGFRCR SERPLBLD CKD-EPI 2021: 82 ML/MIN/1.73M2
GLUCOSE SERPL-MCNC: 109 MG/DL (ref 70–99)
INTERPRETATION ECG - MUSE: NORMAL
MAGNESIUM SERPL-MCNC: 2.3 MG/DL (ref 1.7–2.3)
P AXIS - MUSE: 58 DEGREES
PHOSPHATE SERPL-MCNC: 3.7 MG/DL (ref 2.5–4.5)
POTASSIUM SERPL-SCNC: 4.2 MMOL/L (ref 3.4–5.3)
PR INTERVAL - MUSE: 158 MS
QRS DURATION - MUSE: 126 MS
QT - MUSE: 434 MS
QTC - MUSE: 491 MS
R AXIS - MUSE: 143 DEGREES
SODIUM SERPL-SCNC: 141 MMOL/L (ref 135–145)
SYSTOLIC BLOOD PRESSURE - MUSE: NORMAL MMHG
T AXIS - MUSE: 37 DEGREES
TSH SERPL DL<=0.005 MIU/L-ACNC: 2.54 UIU/ML (ref 0.3–4.2)
VENTRICULAR RATE- MUSE: 77 BPM

## 2023-11-09 PROCEDURE — 84443 ASSAY THYROID STIM HORMONE: CPT | Performed by: PATHOLOGY

## 2023-11-09 PROCEDURE — 83735 ASSAY OF MAGNESIUM: CPT | Performed by: PATHOLOGY

## 2023-11-09 PROCEDURE — 99214 OFFICE O/P EST MOD 30 MIN: CPT | Mod: 25

## 2023-11-09 PROCEDURE — 80048 BASIC METABOLIC PNL TOTAL CA: CPT | Performed by: PATHOLOGY

## 2023-11-09 PROCEDURE — 84100 ASSAY OF PHOSPHORUS: CPT | Performed by: PATHOLOGY

## 2023-11-09 PROCEDURE — 93005 ELECTROCARDIOGRAM TRACING: CPT

## 2023-11-09 PROCEDURE — 36415 COLL VENOUS BLD VENIPUNCTURE: CPT | Performed by: PATHOLOGY

## 2023-11-09 RX ORDER — RESPIRATORY SYNCYTIAL VIRUS VACCINE 120MCG/0.5
0.5 KIT INTRAMUSCULAR ONCE
Qty: 1 EACH | Refills: 0 | Status: CANCELLED | OUTPATIENT
Start: 2023-11-09 | End: 2023-11-09

## 2023-11-09 NOTE — PROGRESS NOTES
PRIMARY CARE CENTER       SUBJECTIVE:  Nadira Perez is a 73 year old female with a PMHx of chronic low back pain, hiatal hernia, GERD, hx of left sided breast cancer diagnosed in 2007 (in remission), essential HTN, HLD, hypothyroidism, hx of PVCs, heart murmur who comes in for subjective bradycardia.    Per cardiology note from 11/2007, she had normal coronary arteries on angiography.  It was suspected that her PVCs are related to low potassium in setting of hydrochlorothiazide.    Hx of dizziness. HR in the 50s while working out. Normally in the 70s. Had 2 occasions when HR was in the 40s around 6 am while laying down in bed.     She has been trying to wean off amlodipine, due to LLQ pain (pt read this could be a side effect).     Pt has a hx of hiatal hernia, complaining of GERD, chest pressure, belching. Denies chest pain with exertion, occasional palpitations.     Pt reports snoring, denies waking up in the middle of night gasping for air. Does not feel tired during the day. STOP Bang score 3    Pt is a retired critical care nurse.     Medications and allergies reviewed by me today.     Pertinent cardiac studies:     1. Coronary angiogram: 2/5/09: normal EF 66%-small inferior WMA, no signif CAD,  reduced diastolic LV compliance-may be adding to dyspnea  2. Holter showed frequent PVC's 1/23/09 which correlated with symptoms of dizziness/SOB  3. Cardiac MRI 1/22/09 showed low normal LV function. There was a small region of wall thinning and abnormal wall motion in mid-inferior wall.  There was a 3 cm RV aneurysm noted that had good mobility.  4. Adenosine Stress Test: 1/23/09: Normal study, frequent monomorphic ventricular ectopy including couplets and triplets.  Normal LV function.    ROS:   Constitutional, neuro, ENT, endocrine, pulmonary, cardiac, gastrointestinal, genitourinary, musculoskeletal, integument and psychiatric systems are negative, except as otherwise  noted.    OBJECTIVE:    /85 (BP Location: Right arm, Patient Position: Sitting, Cuff Size: Adult Regular)   Pulse 79   SpO2 98%    Wt Readings from Last 1 Encounters:   11/02/23 85.7 kg (189 lb)     Gen: NAD, alert, cooperative, non-toxic  Resp: CTAB, no crackles or wheezes, no increased WOB  Cardiac: RRR, no S3/S4, no M/R/G appreciated  GI: soft, non-tender, non-distended  Ext: WWP, +2 pitting edema bilaterally, no erythema  Neuro: AOx3, sensation intact b/l  Psych: appropriate mood & affect       ASSESSMENT/PLAN:    Bifascicular block  Subjective Sinus bradycardia   Hypertension  Pt report subjective bradycardia, she has noticed HR in the 50s while working out. Normally in the 70s. Had 2 occasions when HR was in the 40s which occurred around 6 am while laying down in bed. Seen by cardiology in the past for similar symptoms. Per cardiology note from 11/2007, she had normal coronary arteries on angiography.  It was suspected that her PVCs are related to low potassium in setting of hydrochlorothiazide.  Previous Holter (1/23/2009) showed frequent PVCs which correlated with symptoms of dizziness/SOB.  Pt has stopped hydrochlorothiazide since then.   No evidence of presyncope, syncope or hemodynamic compromise. HR 79 during our visit today. EKG showed frequent PVCs (bigeminy) along with RBBB and left posterior fascicular block c/f bifascicular block. No evidence of AV block.  - Discontinue PTA Toprol XL given above EKG findings  - Increase Spironolactone to 25 mg BID (from 25 mg daily)  - Could consider additional workup for resistant hypertension if not well controlled on current therapy  - Basic metabolic panel, TSH with free T4 reflex, Mg, Phos  - Holter monitor x7 days  - Cardiology referral  - Sleep study referral placed. STOP BANG score 3 (HTN, age, snoring)  - Repeat BMP prior to next PCP visit given increase in Spironolactone    Hiatal hernia  GERD  Pt with known hiatal hernia, reports worsening  dyspnea. Pt has scheduled appt with PCP soon, plans to discuss this further at that time      aPul Hernandez DO, PGY2  Internal Medicine  [facility], [facility]   Clinic phone [phone]  Nov 9, 2023    Pt was seen and plan of care discussed with DONAVAN Bahena .

## 2023-11-09 NOTE — PROGRESS NOTES
Pain clinic follow up note    HPI:   Nadira Perez is a 73 year old year old female  who presents today after seen in the clinic via virtual visit on 8/7/2023 for chronic pain problems, s/p lumbar epidural steroid injection at L3-4 on 5/23/2023. Last visit she was scheduled for caudal epidural injection which she got 9/12/2023.    Patient Supplied Answers To the  Pain Questionnaire      11/13/2023    11:45 AM    Pain -  Patient Entered Questionnaire/Answers   What number best describes your pain right now:  0 = No pain  to  10 = Worst pain imaginable 5   How would you describe the pain cramping   Which of the following worsen your pain standing   Which of the following improve or reduce your pain exercise    medication    relaxation   What number best describes your average pain for the past week:  0 = No pain  to  10 = Worst pain imaginable 5   What number best describes your LOWEST pain in past 24 hours:  0 = No pain  to  10 = Worst pain imaginable 2   What number best describes your WORST pain in past 24 hours:  0 = No pain  to  10 = Worst pain imaginable 8   When is your pain worst Constant   What non-medicine treatments have you already had for your pain pain clinic    physical therapy    acupuncture    spine injections (shots)    surgery    exercise     No images are attached to the encounter.    Pain today is 4/10 in intensity and has always been there. Pain in the left paraspinal L5-S1 region has a knot at the site which is painful for her.     Patient reports compared to the last visit their pain is improved by following percent:    Pain intensity: 40 %  Pain frequency: 40 %  Duration of pain episodes: 40 %  Falling asleep: 0 %  Staying asleep: 0 %  Ability to work: 20 %  Ability to exercise: 20 %  Performing chores: 20 %    Its scheduled for Holter monitor tomorrow.    New imaging reviewed with the patient:  No new episodes    ROS:  ROS      Significant Medical History:   Past Medical History:    Diagnosis Date    Arthritis     Bone disease     Breast cancer (H)     H/O kyphoplasty     Hearing problem     History of kidney stones     History of radiation therapy     Hyperlipemia     Hypertension     Hypopotassemia     Irregular heart beat     Kidney problem     Lymph edema     Medullary sponge kidney     Osteopenia     PONV (postoperative nausea and vomiting)     Reduced vision     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    Thyroid disease           Past Surgical History:  Past Surgical History:   Procedure Laterality Date    ABDOMEN SURGERY      ovarian cyst, mesh    ARTHRODESIS WRIST Right     ARTHRODESIS WRIST  02/14/2013    Procedure: ARTHRODESIS WRIST;  left wrist scaphoid excision, four bone fusion, iliac crest bone graft  ( Mac with block);  Surgeon: Av Mendez MD;  Location:  OR    BIOPSY      skin, vaginal    BLEPHAROPLASTY BILATERAL Bilateral 5/6/2022    Procedure: UPPER BLEPHAROPLASTY, BILATERAL;  Surgeon: Jemal Sanchez MD;  Location: [facility] [place] OR    CATARACT IOL, RT/LT Right 03/13/2018    CATARACT IOL, RT/LT Left 02/20/2018    COLONOSCOPY  12/24/2013    Procedure: COMBINED COLONOSCOPY, SINGLE BIOPSY/POLYPECTOMY BY BIOPSY;  COLONOSCOPY;  Surgeon: Dom Alvarez MD;  Location:  GI    COSMETIC BLEPHAROPLASTY LOWER LIDS BILATERAL Bilateral 5/6/2022    Procedure: BLEPHAROPLASTY, LOWER EYELID, BILATERAL, COSMETIC;  Surgeon: Jemal Sanchez MD;  Location: [facility] [place] OR    ESOPHAGOSCOPY, GASTROSCOPY, DUODENOSCOPY (EGD), COMBINED N/A 11/23/2016    Procedure: COMBINED ESOPHAGOSCOPY, GASTROSCOPY, DUODENOSCOPY (EGD);  Surgeon: Quinten Feliciano MD;  Location:  GI    EXTERNAL EAR SURGERY      right    EYE SURGERY      radial keratomy    FUSION, SPINE, INTERBODY, OBLIQUE ANTERIOR AND LUMBAR, 2 LEVELS, POST APPROACH, USING OTS N/A 11/18/2021    Procedure: Part 1: Oblique Anterior Interbody Fusion at Lumbar 5 to sacral 1 with use of Bone Morphogenic Protein,;  Surgeon: James  Serge Martinez MD;  Location: [facility] OR    GRAFT BONE FROM ILIAC CREST  02/14/2013    Procedure: GRAFT BONE FROM ILIAC CREST;  mac with block and local infilitration;  Surgeon: Av Mendez MD;  [facility]: [facility]    HC BREATH HYDROGEN TEST N/A 10/14/2016    Procedure: HYDROGEN BREATH TEST;  Surgeon: Cheri Barron MD;  Location: [facility] GI    HERNIA REPAIR      umbilical age 18 mos.    HYSTERECTOMY TOTAL ABDOMINAL  05/03/2000    INJECT EPIDURAL CAUDAL N/A 9/12/2023    Procedure: Caudal epidural steroid injection with fluoroscopy;  Surgeon: Natasha Umaña MD;  Location: [facility] OR    INJECT EPIDURAL LUMBAR N/A 5/23/2023    Procedure: L3-4 interlaminar epidural steroid injection with fluoroscopy ( left> right);  Surgeon: Natasha Umaña MD;  Location: [facility] OR    INJECT JOINT SACROILIAC Left 4/27/2023    Procedure: Left sacroiliac joint steroid injection with fluoroscopy;  Surgeon: Natasha Umaña MD;  Location: [facility] OR    MASTECTOMY MODIFIED RADICAL Bilateral     bilateral; right breast prophylactic    OPTICAL TRACKING SYSTEM FUSION POSTERIOR SPINE LUMBAR N/A 11/18/2021    Procedure: Open Posterior Instrumented Spinal Fusion at Lumbar 5 to Sacral 1, with grimm aguayo osteotomy, use of O-Arm/Stealth;  Surgeon: Serge Ramirez MD;  Location: [facility] OR    PARATHYROIDECTOMY  09/23/2004    R SUPERIOR    PARATHYROIDECTOMY  09/23/2004    parathyroid resection, subtotal    RELEASE CARPAL TUNNEL Right 12/2/2021    Procedure: RIGHT CARPAL TUNNEL RELEASE, RIGHT WRIST HARDWARE REMOVAL, RIGHT CARPAL BOSS EXCISION;  Surgeon: Rolan Conley MD;  Location: [facility] OR    REMOVE HARDWARE HAND  09/24/2013    Procedure: REMOVE HARDWARE HAND;  Left Hand Screw Removal       RHINOPLASTY  1968    thyr proc skin closed cosmetic manner by subcuticular stitch  01/23/2009    THYROPLASTY  10/09/2009    TONSILLECTOMY  1977    VITRECTOMY PARSPLANA WITH 25 GAUGE SYSTEM Left 6/20/2022    Procedure: LEFT EYE VITRECTOMY, PARS PLANA  APPROACH, USING 25-GAUGE INSTRUMENTS, laser;  Surgeon: Felix Carlos MD;  Location: [facility] OR    WRIST SURGERY      wrist arthrodesis          Family History:  Family History   Problem Relation Age of Onset    Neurologic Disorder Mother         Anuerysm of Cerebral Artery, Dementia    Diabetes Mother     Thyroid Disease Mother         ,    Cerebrovascular Disease Mother     Dementia Mother     Osteoporosis Mother     Heart Disease Father         AAA    Hypertension Father     Circulatory Brother         Perihperal Neurophathy    Diabetes Maternal Grandmother     Asthma Maternal Grandmother     Chronic Obstructive Pulmonary Disease Maternal Grandfather         father    Asthma Maternal Grandfather     Diabetes Maternal Aunt         x2    Melanoma Maternal Aunt     Glaucoma Maternal Aunt     Breast Cancer Cousin     Dementia Other     Cancer Other         malignant melanoma    Hypertension Other     Hypertension Other     Cerebrovascular Disease Other     Cerebrovascular Disease Other     Obesity Other     Respiratory Other     Cancer Other     Diabetes Other     Asthma Other     Macular Degeneration No family hx of     Coronary Artery Disease No family hx of     Hyperlipidemia No family hx of     Kidney Disease No family hx of     Thrombosis No family hx of     Arthritis No family hx of     Depression No family hx of     Mental Illness No family hx of     Substance Abuse No family hx of     Cystic Fibrosis No family hx of     Early Death No family hx of     Coronary Artery Disease Early Onset No family hx of     Heart Failure No family hx of     Bleeding Diathesis No family hx of     Ovarian Cancer No family hx of     Uterine Cancer No family hx of     Prostate Cancer No family hx of     Colorectal Cancer No family hx of     Pancreatic Cancer No family hx of     Lung Cancer No family hx of     Other Cancer No family hx of     Autoimmune Disease No family hx of     Unknown/Adopted No family hx of      Genetic Disorder No family hx of     Bleeding Disorder No family hx of     Clotting Disorder No family hx of     Anesthesia Reaction No family hx of           Social History:  Social History     Socioeconomic History    Marital status:      Spouse name: Not on file    Number of children: Not on file    Years of education: Not on file    Highest education level: Not on file   Occupational History    Not on file   Tobacco Use    Smoking status: Never     Passive exposure: Never    Smokeless tobacco: Never   Substance and Sexual Activity    Alcohol use: Not Currently     Alcohol/week: 7.0 standard drinks of alcohol     Types: 7 Glasses of wine per week     Comment: Rare to occasional    Drug use: Yes     Types: Marijuana    Sexual activity: Not Currently     Partners: Male     Birth control/protection: Abstinence   Other Topics Concern     Service No    Blood Transfusions Not Asked    Caffeine Concern No    Occupational Exposure No    Hobby Hazards No    Sleep Concern No    Stress Concern No    Weight Concern No    Special Diet No    Back Care No    Exercise Yes     Comment: walks 4-6x  week for 20-30 min. each    Bike Helmet Not Asked    Seat Belt Yes    Self-Exams Yes    Parent/sibling w/ CABG, MI or angioplasty before 65F 55M? No   Social History Narrative    .  Recently retired. She has retired from [employer] and hopes to focus on a home care program, [employer] [facility],  for the elderly in the future.        She lives with a renter.         She exercises 50 minutes three times a week.     Social Determinants of Health     Financial Resource Strain: Low Risk  (11/9/2023)    Financial Resource Strain     Within the past 12 months, have you or your family members you live with been unable to get utilities (heat, electricity) when it was really needed?: No   Food Insecurity: Low Risk  (11/9/2023)    Food Insecurity     Within the past 12 months, did you worry that your food would run out before you got  money to buy more?: No     Within the past 12 months, did the food you bought just not last and you didn t have money to get more?: No   Transportation Needs: Low Risk  (11/9/2023)    Transportation Needs     Within the past 12 months, has lack of transportation kept you from medical appointments, getting your medicines, non-medical meetings or appointments, work, or from getting things that you need?: No   Physical Activity: Not on file   Stress: Not on file   Social Connections: Not on file   Interpersonal Safety: Not At Risk (4/14/2023)    Humiliation, Afraid, Rape, and Kick questionnaire     Fear of Current or Ex-Partner: No     Emotionally Abused: No     Physically Abused: No     Sexually Abused: No   Housing Stability: Low Risk  (11/9/2023)    Housing Stability     Do you have housing? : Yes     Are you worried about losing your housing?: No     Social History     Social History Narrative    .  Recently retired. She has retired from [employer] and hopes to focus on a home care program, [employer],  for the elderly in the future.        She lives with a renter.         She exercises 50 minutes three times a week.          Allergies:  Allergies   Allergen Reactions    Erythromycin Nausea    Penicillins Hives     Around age 6 - doesn't recall full reaction, mother told her it was hives.     Has tolerated cephalsporins.        Current Medications:   Current Outpatient Medications   Medication Sig Dispense Refill    acetaminophen (TYLENOL) 500 MG tablet Take 500-1,000 mg by mouth every 6 hours as needed for mild pain      amLODIPine (NORVASC) 10 MG tablet Take 1 tablet (10 mg) by mouth daily 90 tablet 1    Ascorbic Acid (VITAMIN C) 500 MG CHEW Take 1 tablet by mouth daily      atorvastatin (LIPITOR) 80 MG tablet Take 1 tablet (80 mg) by mouth daily **Call clinic to schedule follow up LABS [phone] **NEEDED FOR FURTHER REFILLS** 90 tablet 0    biotin 1000 MCG TABS tablet Take 1,000 mcg by mouth daily       Cholecalciferol (VITAMIN D3) 50 MCG (2000 UT) CAPS TAKE 3 CAPSULES (6,000 UNITS) BY MOUTH DAILY. 270 capsule 3    CRANBERRY EXTRACT PO Take 650 mg by mouth daily ~10 am  Takes 1      diclofenac (VOLTAREN) 1 % topical gel Apply to toe or other joints painful 100 g 3    gabapentin (NEURONTIN) 300 MG capsule Take 4 capsules (1,200 mg) by mouth 3 times daily 1080 capsule 3    HEMP OIL OR EXTRACT OR OTHER CBD CANNABINOID, NOT MEDICAL CANNABIS, THC      HYDROcodone-acetaminophen (NORCO) 5-325 MG tablet Take 1 tab for breakthrough pain 1-2 times a week. 30 tablet 0    hydrOXYzine (VISTARIL) 25 MG capsule TAKE 1 CAPSULE BY MOUTH AT BEDTIME. 90 capsule 2    levothyroxine (SYNTHROID/LEVOTHROID) 112 MCG tablet TAKE 1/2 TABLET BY MOUTH EVERY DAY **Call clinic to schedule follow up LABS [phone] **NEEDED FOR FURTHER REFILLS** 45 tablet 0    lisinopril (ZESTRIL) 40 MG tablet Take 1 tablet (40 mg) by mouth daily 90 tablet 3    magnesium 250 MG tablet Take 1 tablet by mouth daily      melatonin 5 MG tablet Take 10 mg by mouth At Bedtime       methocarbamol (ROBAXIN) 500 MG tablet Take 1 tablet (500 mg) by mouth 3 times daily 40 tablet 4    Multiple Vitamin (MULTI-VITAMIN) per tablet Take 1 tablet by mouth daily      naloxone (NARCAN) 4 MG/0.1ML nasal spray Spray 1 spray (4 mg) into one nostril alternating nostrils once as needed for opioid reversal every 2-3 minutes until assistance arrives 0.2 mL 0    ondansetron (ZOFRAN-ODT) 4 MG ODT tab Take 1 tablet (4 mg) by mouth every 12 hours as needed for nausea 180 tablet 3    spironolactone (ALDACTONE) 25 MG tablet Take 2 tablets (50 mg) by mouth daily 180 tablet 1    tiZANidine (ZANAFLEX) 4 MG tablet TAKE 0.5-1 TABLET BY MOUTH 3 TIMES DAILY 60 tablet 0    Vaginal Lubricant (REPHRESH) GEL Place 2 g vaginally every 3 days 2 g 3    metoprolol succinate ER (TOPROL XL) 50 MG 24 hr tablet Take 1 tablet (50 mg) by mouth daily 90 tablet 1          Current Pain Medications:  Gabapentin and  "tylenol     Vicodin 2-3 a week    Physical Exam:     BP (!) 144/89 (BP Location: Right arm, Patient Position: Chair, Cuff Size: Adult Regular)   Pulse 90   Ht 1.676 m (5' 6\")   Wt 87.1 kg (192 lb)   SpO2 99%   BMI 30.99 kg/m      General Appearance: No distress, seated comfortably  Mood: Euthymic  HE ENT: Non constricted pupils  Respiratory: Non labored breathing  CVS: Regular rate and rhythm  GI: Soft, non distended, no TTP  Skin: No rashes over exposed skin  MS: Normal muscle bulk  Neuro: Cranial nerves 2-12 intact  Gait: Ambulates with a walker    Pain specific exam:  Walking independently with a walker    ASSESSMENT AND PLAN:     Encounter Diagnosis:    Lumbar stenosis and neurogenic claudication  Lumbosacral spondylosis  L5/S1 fusion  Spinal stenosis  Peripheral neuropathy        Nadira Perez is a 73 year old year old female  who presents today after seen in the clinic via virtual visit on 8/7/2023 for chronic pain problems, s/p lumbar epidural steroid injection at L3-4 on 5/23/2023. Last visit she was scheduled for caudal epidural injection which she got 9/12/2023.    RECOMMENDATIONS:     1. Medications: No medications were prescribed today.    2. Procedure: No procedures were scheduled today.    3.  Acupuncture: Referral placed today.    Follow up: In 3 to 4 months    Patient was seen and discussed with my staff physician Dr. Umaña, who agrees with my assessment and plan.    Jeffy GUERIN  Pain Fellow  [facility]              "

## 2023-11-09 NOTE — PATIENT INSTRUCTIONS
Stop Toprol XL  Increase Spironolactone to 25 mg twice daily  Follow up with cardiology and sleep study( referral placed at this visit)  Holter monitor for 7 days  Repeat Blood work prior to next visit with PCP    Decrease caffeine intake as this could trigger PVCs.     Please seek medical help immediately if experiencing chest pain, acute shortness of breath, feeling like passing out.

## 2023-11-09 NOTE — NURSING NOTE
Nadira Perez is a 73 year old female patient that presents today in clinic for the following:    Chief Complaint   Patient presents with    Bradycardia     Pt reports HR at 40, recently 50s and 60s, pt reports they're usually in 70s/80s. Pt reports recent dizziness associated with lower HR. No associated low BP    Abdominal Pain     Pt reports hiatal hernia, imaging taken. Nausea when lying down. Began 2 weeks ago.    Mass     Pt reports lump on right shin, may be fluid, pain associated especially when pressed on, but sometimes not when pressure on it     The patient's allergies and medications were reviewed as noted. A set of vitals were recorded as noted without incident: /85 (BP Location: Right arm, Patient Position: Sitting, Cuff Size: Adult Regular)   Pulse 79   SpO2 98% . The patient does not have any other questions for the provider.    Hector Ospina, EMT 10:01 AM on 11/9/2023

## 2023-11-13 ENCOUNTER — OFFICE VISIT (OUTPATIENT)
Dept: ANESTHESIOLOGY | Facility: CLINIC | Age: 73
End: 2023-11-13
Payer: COMMERCIAL

## 2023-11-13 ENCOUNTER — TELEPHONE (OUTPATIENT)
Dept: ANESTHESIOLOGY | Facility: CLINIC | Age: 73
End: 2023-11-13

## 2023-11-13 VITALS
SYSTOLIC BLOOD PRESSURE: 144 MMHG | DIASTOLIC BLOOD PRESSURE: 89 MMHG | HEIGHT: 66 IN | BODY MASS INDEX: 30.86 KG/M2 | OXYGEN SATURATION: 99 % | WEIGHT: 192 LBS | HEART RATE: 90 BPM

## 2023-11-13 DIAGNOSIS — M54.17 LUMBOSACRAL RADICULOPATHY: ICD-10-CM

## 2023-11-13 DIAGNOSIS — M79.2 NEUROPATHIC PAIN: Primary | ICD-10-CM

## 2023-11-13 PROCEDURE — 99213 OFFICE O/P EST LOW 20 MIN: CPT | Mod: GC | Performed by: ANESTHESIOLOGY

## 2023-11-13 ASSESSMENT — PAIN SCALES - PAIN ENJOYMENT GENERAL ACTIVITY SCALE (PEG)
INTERFERED_ENJOYMENT_LIFE: 5
INTERFERED_GENERAL_ACTIVITY: 5
AVG_PAIN_PASTWEEK: 5
PEG_TOTALSCORE: 5

## 2023-11-13 ASSESSMENT — PAIN SCALES - GENERAL: PAINLEVEL: SEVERE PAIN (6)

## 2023-11-13 NOTE — NURSING NOTE
Patient presents with:  Follow Up: Follow-up Lower Back Pain       Severe Pain (6)     Pain Medications       Analgesics Other Refills Start End     acetaminophen (TYLENOL) 500 MG tablet          Sig - Route: Take 500-1,000 mg by mouth every 6 hours as needed for mild pain - Oral    Class: Historical      Opioid Combinations Refills Start End     HYDROcodone-acetaminophen (NORCO) 5-325 MG tablet    0 10/5/2023     Sig: Take 1 tab for breakthrough pain 1-2 times a week.    Class: E-Prescribe    Earliest Fill Date: 10/5/2023    Notes to Pharmacy: Fill on or after 10/5/23.            What medications are you using for pain? Tylenol ,Hydrocodone,    (New patients only) Have you been seen by another pain clinic/ provider? no    (Return Patients only) What refills are you needing today?  no

## 2023-11-13 NOTE — LETTER
11/13/2023       RE: Nadira Perez  [id number]  [facility] [id number]     Dear Colleague,    Thank you for referring your patient, Nadira Perez, to the [facility] FOR [facility]HENSIVE PAIN MANAGEMENT [place] at [facility]. Please see a copy of my visit note below.    Pain clinic follow up note    HPI:   Nadira Perez is a 73 year old year old female  who presents today after seen in the clinic via virtual visit on 8/7/2023 for chronic pain problems, s/p lumbar epidural steroid injection at L3-4 on 5/23/2023. Last visit she was scheduled for caudal epidural injection which she got 9/12/2023.    Patient Supplied Answers To the  Pain Questionnaire      11/13/2023    11:45 AM    Pain -  Patient Entered Questionnaire/Answers   What number best describes your pain right now:  0 = No pain  to  10 = Worst pain imaginable 5   How would you describe the pain cramping   Which of the following worsen your pain standing   Which of the following improve or reduce your pain exercise    medication    relaxation   What number best describes your average pain for the past week:  0 = No pain  to  10 = Worst pain imaginable 5   What number best describes your LOWEST pain in past 24 hours:  0 = No pain  to  10 = Worst pain imaginable 2   What number best describes your WORST pain in past 24 hours:  0 = No pain  to  10 = Worst pain imaginable 8   When is your pain worst Constant   What non-medicine treatments have you already had for your pain pain clinic    physical therapy    acupuncture    spine injections (shots)    surgery    exercise     No images are attached to the encounter.    Pain today is 4/10 in intensity and has always been there. Pain in the left paraspinal L5-S1 region has a knot at the site which is painful for her.     Patient reports compared to the last visit their pain is improved by following percent:    Pain intensity: 40  %  Pain frequency: 40 %  Duration of pain episodes: 40 %  Falling asleep: 0 %  Staying asleep: 0 %  Ability to work: 20 %  Ability to exercise: 20 %  Performing chores: 20 %    Its scheduled for Holter monitor tomorrow.    New imaging reviewed with the patient:  No new episodes    ROS:  ROS      Significant Medical History:   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    Thyroid disease           Past Surgical History:  Past Surgical History:   Procedure Laterality Date    ABDOMEN SURGERY      ovarian cyst, mesh    ARTHRODESIS WRIST Right     ARTHRODESIS WRIST  02/14/2013    Procedure: ARTHRODESIS WRIST;  left wrist scaphoid excision, four bone fusion, iliac crest bone graft  ( Mac with block);  Surgeon: Av Mendez MD;  [facility]    BIOPSY      skin, vaginal    BLEPHAROPLASTY BILATERAL Bilateral 5/6/2022    Procedure: UPPER BLEPHAROPLASTY, BILATERAL;  Surgeon: Jemal Sanchez MD;  Location: [facility] OR    CATARACT IOL, RT/LT Right 03/13/2018    CATARACT IOL, RT/LT Left 02/20/2018    COLONOSCOPY  12/24/2013    Procedure: COMBINED COLONOSCOPY, SINGLE BIOPSY/POLYPECTOMY BY BIOPSY;  COLONOSCOPY;  Surgeon: Dom Alvarez MD;  Location:  GI    COSMETIC BLEPHAROPLASTY LOWER LIDS BILATERAL Bilateral 5/6/2022    Procedure: BLEPHAROPLASTY, LOWER EYELID, BILATERAL, COSMETIC;  Surgeon: Jemal Sanchez MD;  Location: [facility] OR    ESOPHAGOSCOPY, GASTROSCOPY, DUODENOSCOPY (EGD), COMBINED N/A 11/23/2016    Procedure: COMBINED ESOPHAGOSCOPY, GASTROSCOPY, DUODENOSCOPY (EGD);  Surgeon: Braden  MD Quinten;  Location:  GI    EXTERNAL EAR SURGERY      right    EYE SURGERY      radial keratomy    FUSION, SPINE, INTERBODY, OBLIQUE ANTERIOR AND LUMBAR, 2 LEVELS, POST APPROACH, USING OTS N/A 11/18/2021    Procedure: Part 1: Oblique Anterior Interbody Fusion at Lumbar 5 to sacral 1 with use of Bone Morphogenic Protein,;  Surgeon: Serge Ramirez MD;  [facility]    GRAFT BONE FROM ILIAC CREST  02/14/2013    Procedure: GRAFT BONE FROM ILIAC CREST;  mac with block and local infilitration;  Surgeon: Av Mendez MD;  [facility]    HC BREATH HYDROGEN TEST N/A 10/14/2016    Procedure: HYDROGEN BREATH TEST;  Surgeon: Cheri Barron MD;  Location:  GI    HERNIA REPAIR      umbilical age 18 mos.    HYSTERECTOMY TOTAL ABDOMINAL  05/03/2000    INJECT EPIDURAL CAUDAL N/A 9/12/2023    Procedure: Caudal epidural steroid injection with fluoroscopy;  Surgeon: Natasha Umaña MD;  Location: [facility] OR    INJECT EPIDURAL LUMBAR N/A 5/23/2023    Procedure: L3-4 interlaminar epidural steroid injection with fluoroscopy ( left> right);  Surgeon: Natasha Umaña MD;  Location: [facility] OR    INJECT JOINT SACROILIAC Left 4/27/2023    Procedure: Left sacroiliac joint steroid injection with fluoroscopy;  Surgeon: Natasha Umaña MD;  Location: [facility] OR    MASTECTOMY MODIFIED RADICAL Bilateral     bilateral; right breast prophylactic    OPTICAL TRACKING SYSTEM FUSION POSTERIOR SPINE LUMBAR N/A 11/18/2021    Procedure: Open Posterior Instrumented Spinal Fusion at Lumbar 5 to Sacral 1, with grimm aguayo osteotomy, use of O-Arm/Stealth;  Surgeon: Serge Ramirez MD;  [facility]    PARATHYROIDECTOMY  09/23/2004    R SUPERIOR    PARATHYROIDECTOMY  09/23/2004    parathyroid resection, subtotal    RELEASE CARPAL TUNNEL Right 12/2/2021    Procedure: RIGHT CARPAL TUNNEL RELEASE, RIGHT WRIST HARDWARE REMOVAL, RIGHT CARPAL BOSS EXCISION;  Surgeon: Rloan Conley MD;  Location: [facility] OR    REMOVE  HARDWARE HAND  09/24/2013    Procedure: REMOVE HARDWARE HAND;  Left Hand Screw Removal       RHINOPLASTY  1968    thyr proc skin closed cosmetic manner by subcuticular stitch  01/23/2009    THYROPLASTY  10/09/2009    TONSILLECTOMY  1977    VITRECTOMY PARSPLANA WITH 25 GAUGE SYSTEM Left 6/20/2022    Procedure: LEFT EYE VITRECTOMY, PARS PLANA APPROACH, USING 25-GAUGE INSTRUMENTS, laser;  Surgeon: Felix Carlos MD;  Location: [facility] OR    WRIST SURGERY      wrist arthrodesis          Family History:  Family History   Problem Relation Age of Onset    Neurologic Disorder Mother         Anuerysm of Cerebral Artery, Dementia    Diabetes Mother     Thyroid Disease Mother         ,    Cerebrovascular Disease Mother     Dementia Mother     Osteoporosis Mother     Heart Disease Father         AAA    Hypertension Father     Circulatory Brother         Perihperal Neurophathy    Diabetes Maternal Grandmother     Asthma Maternal Grandmother     Chronic Obstructive Pulmonary Disease Maternal Grandfather         father    Asthma Maternal Grandfather     Diabetes Maternal Aunt         x2    Melanoma Maternal Aunt     Glaucoma Maternal Aunt     Breast Cancer Cousin     Dementia Other     Cancer Other         malignant melanoma    Hypertension Other     Hypertension Other     Cerebrovascular Disease Other     Cerebrovascular Disease Other     Obesity Other     Respiratory Other     Cancer Other     Diabetes Other     Asthma Other     Macular Degeneration No family hx of     Coronary Artery Disease No family hx of     Hyperlipidemia No family hx of     Kidney Disease No family hx of     Thrombosis No family hx of     Arthritis No family hx of     Depression No family hx of     Mental Illness No family hx of     Substance Abuse No family hx of     Cystic Fibrosis No family hx of     Early Death No family hx of     Coronary Artery Disease Early Onset No family hx of     Heart Failure No family hx of     Bleeding Diathesis No  family hx of     Ovarian Cancer No family hx of     Uterine Cancer No family hx of     Prostate Cancer No family hx of     Colorectal Cancer No family hx of     Pancreatic Cancer No family hx of     Lung Cancer No family hx of     Other Cancer No family hx of     Autoimmune Disease No family hx of     Unknown/Adopted No family hx of     Genetic Disorder No family hx of     Bleeding Disorder No family hx of     Clotting Disorder No family hx of     Anesthesia Reaction No family hx of           Social History:  Social History     Socioeconomic History    Marital status:      Spouse name: Not on file    Number of children: Not on file    Years of education: Not on file    Highest education level: Not on file   Occupational History    Not on file   Tobacco Use    Smoking status: Never     Passive exposure: Never    Smokeless tobacco: Never   Substance and Sexual Activity    Alcohol use: Not Currently     Alcohol/week: 7.0 standard drinks of alcohol     Types: 7 Glasses of wine per week     Comment: Rare to occasional    Drug use: Yes     Types: Marijuana    Sexual activity: Not Currently     Partners: Male     Birth control/protection: Abstinence   Other Topics Concern     Service No    Blood Transfusions Not Asked    Caffeine Concern No    Occupational Exposure No    Hobby Hazards No    Sleep Concern No    Stress Concern No    Weight Concern No    Special Diet No    Back Care No    Exercise Yes     Comment: walks 4-6x  week for 20-30 min. each    Bike Helmet Not Asked    Seat Belt Yes    Self-Exams Yes    Parent/sibling w/ CABG, MI or angioplasty before 65F 55M? No   Social History Narrative    .  Recently retired. 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        She lives with a renter.         She exercises 50 minutes three times a week.     Social Determinants of Health     Financial Resource Strain: Low Risk   (11/9/2023)    Financial Resource Strain     Within the past 12 months, have you or your family members you live with been unable to get utilities (heat, electricity) when it was really needed?: No   Food Insecurity: Low Risk  (11/9/2023)    Food Insecurity     Within the past 12 months, did you worry that your food would run out before you got money to buy more?: No     Within the past 12 months, did the food you bought just not last and you didn t have money to get more?: No   Transportation Needs: Low Risk  (11/9/2023)    Transportation Needs     Within the past 12 months, has lack of transportation kept you from medical appointments, getting your medicines, non-medical meetings or appointments, work, or from getting things that you need?: No   Physical Activity: Not on file   Stress: Not on file   Social Connections: Not on file   Interpersonal Safety: Not At Risk (4/14/2023)    Humiliation, Afraid, Rape, and Kick questionnaire     Fear of Current or Ex-Partner: No     Emotionally Abused: No     Physically Abused: No     Sexually Abused: No   Housing Stability: Low Risk  (11/9/2023)    Housing Stability     Do you have housing? : Yes     Are you worried about losing your housing?: No     Social History     Social History Narrative    .  Recently retired. She has retired from [employer] and hopes to focus on a home care program, [employer],  for the elderly in the future.        She lives with a renter.         She exercises 50 minutes three times a week.          Allergies:  Allergies   Allergen Reactions    Erythromycin Nausea    Penicillins Hives     Around age 6 - doesn't recall full reaction, mother told her it was hives.     Has tolerated cephalsporins.        Current Medications:   Current Outpatient Medications   Medication Sig Dispense Refill    acetaminophen (TYLENOL) 500 MG tablet Take 500-1,000 mg by mouth every 6 hours as needed for mild pain      amLODIPine (NORVASC) 10 MG tablet Take 1 tablet  (10 mg) by mouth daily 90 tablet 1    Ascorbic Acid (VITAMIN C) 500 MG CHEW Take 1 tablet by mouth daily      atorvastatin (LIPITOR) 80 MG tablet Take 1 tablet (80 mg) by mouth daily **Call clinic to schedule follow up LABS [phone] **NEEDED FOR FURTHER REFILLS** 90 tablet 0    biotin 1000 MCG TABS tablet Take 1,000 mcg by mouth daily      Cholecalciferol (VITAMIN D3) 50 MCG (2000 UT) CAPS TAKE 3 CAPSULES (6,000 UNITS) BY MOUTH DAILY. 270 capsule 3    CRANBERRY EXTRACT PO Take 650 mg by mouth daily ~10 am  Takes 1      diclofenac (VOLTAREN) 1 % topical gel Apply to toe or other joints painful 100 g 3    gabapentin (NEURONTIN) 300 MG capsule Take 4 capsules (1,200 mg) by mouth 3 times daily 1080 capsule 3    HEMP OIL OR EXTRACT OR OTHER CBD CANNABINOID, NOT MEDICAL CANNABIS, THC      HYDROcodone-acetaminophen (NORCO) 5-325 MG tablet Take 1 tab for breakthrough pain 1-2 times a week. 30 tablet 0    hydrOXYzine (VISTARIL) 25 MG capsule TAKE 1 CAPSULE BY MOUTH AT BEDTIME. 90 capsule 2    levothyroxine (SYNTHROID/LEVOTHROID) 112 MCG tablet TAKE 1/2 TABLET BY MOUTH EVERY DAY **Call clinic to schedule follow up LABS [phone] **NEEDED FOR FURTHER REFILLS** 45 tablet 0    lisinopril (ZESTRIL) 40 MG tablet Take 1 tablet (40 mg) by mouth daily 90 tablet 3    magnesium 250 MG tablet Take 1 tablet by mouth daily      melatonin 5 MG tablet Take 10 mg by mouth At Bedtime       methocarbamol (ROBAXIN) 500 MG tablet Take 1 tablet (500 mg) by mouth 3 times daily 40 tablet 4    Multiple Vitamin (MULTI-VITAMIN) per tablet Take 1 tablet by mouth daily      naloxone (NARCAN) 4 MG/0.1ML nasal spray Spray 1 spray (4 mg) into one nostril alternating nostrils once as needed for opioid reversal every 2-3 minutes until assistance arrives 0.2 mL 0    ondansetron (ZOFRAN-ODT) 4 MG ODT tab Take 1 tablet (4 mg) by mouth every 12 hours as needed for nausea 180 tablet 3    spironolactone (ALDACTONE) 25 MG tablet Take 2 tablets (50 mg) by  "mouth daily 180 tablet 1    tiZANidine (ZANAFLEX) 4 MG tablet TAKE 0.5-1 TABLET BY MOUTH 3 TIMES DAILY 60 tablet 0    Vaginal Lubricant (REPHRESH) GEL Place 2 g vaginally every 3 days 2 g 3    metoprolol succinate ER (TOPROL XL) 50 MG 24 hr tablet Take 1 tablet (50 mg) by mouth daily 90 tablet 1          Current Pain Medications:  Gabapentin and tylenol     Vicodin 2-3 a week    Physical Exam:     BP (!) 144/89 (BP Location: Right arm, Patient Position: Chair, Cuff Size: Adult Regular)   Pulse 90   Ht 1.676 m (5' 6\")   Wt 87.1 kg (192 lb)   SpO2 99%   BMI 30.99 kg/m      General Appearance: No distress, seated comfortably  Mood: Euthymic  HE ENT: Non constricted pupils  Respiratory: Non labored breathing  CVS: Regular rate and rhythm  GI: Soft, non distended, no TTP  Skin: No rashes over exposed skin  MS: Normal muscle bulk  Neuro: Cranial nerves 2-12 intact  Gait: Ambulates with a walker    Pain specific exam:  Walking independently with a walker    ASSESSMENT AND PLAN:     Encounter Diagnosis:    Lumbar stenosis and neurogenic claudication  Lumbosacral spondylosis  L5/S1 fusion  Spinal stenosis  Peripheral neuropathy        Nadira Perez is a 73 year old year old female  who presents today after seen in the clinic via virtual visit on 8/7/2023 for chronic pain problems, s/p lumbar epidural steroid injection at L3-4 on 5/23/2023. Last visit she was scheduled for caudal epidural injection which she got 9/12/2023.    RECOMMENDATIONS:     1. Medications: No medications were prescribed today.    2. Procedure: No procedures were scheduled today.    3.  Acupuncture: Referral placed today.    Follow up: In 3 to 4 months    Patient was seen and discussed with my staff physician Dr. Umaña, who agrees with my assessment and plan.    Jeffy GUERIN  Pain Fellow  [facility]    Attestation signed by Natasha Umaña MD at 11/20/2023  9:33 AM:  ATTENDING ATTESTATION  I saw the patient along with the pain medicine fellow  " Jeffy Linares. Please see his note above for full details. I was involved in gathering history, physical examination and development of the plan of care.       Called pt to remind her of her appt Monday, 11/13.        Again, thank you for allowing me to participate in the care of your patient.      Sincerely,    Natasha Umaña MD

## 2023-11-13 NOTE — NURSING NOTE
RN read through the instructions with the patient for the recommended procedure: INJECTION, EPIDURAL, CAUDAL  Patient verbalized understanding to holding appropriate medication per protocol and was agreeable to NPO policy and needing a .    Anticoagulant: None reported    Recommended Follow Up: Follow up in 3-4 months.    Lyric Johns RNCC

## 2023-11-13 NOTE — PROGRESS NOTES
Orthopaedic Surgery Hand Clinic Progress Note    Patient Name: Nadira Perez  MRN: [id number]  : 1950  Date: 2023    Date of Surgery: 21    Surgery Performed: 1) Open right carpal tunnel release  2) Removal of buried implant (deep) right wrist    Subjective:     23: Patient was last seen 23. She received left radiocarpal injection at her last visit that provided relief for about 2 months. She would like both wrists injected today. She states the cramping of her fingers has improved.     23: Patient was last seen 23. She had ultrasound completed 6/15/23 and is here to review results today. Since last visit she reports continued spasms and cramping of the fingers in her left hand. She continues to have cramping/locking of her fingers with certain postures like putting on earrings or her bra. Seems to most affected the ulnar sided digits. She does not have classic pain or paresthesias in the median nerve distribution. No nocturnal symptoms. She has been wearing a wrist brace, using Tylenol and gabapentin.  She is interested in repeating the left radiocarpal  injection today.    23: Patient was last seen 23 at which time I provided bilateral radiocarpal injections for wrist OA and left middle finger trigger injection. She is still having relief from all three injections. MF triggering seems to have resolved. Main concern today is cramping of the fingers. Ongoing 6 months, worse over the last 2. She states the thumb, index, middle, and ring fingers seem to lock up with certain hand postures like when putting on earrings or wearing her bra. She does not have classic pain or paresthesias in the median nerve distribution. No nocturnal symptoms. She has been wearing a wrist brace, using Tylenol and gabapentin.    Update 2023 Patient last seen on 22. At which time patient had cortisone injection for A1 pulley of left ring finger and bilateral radiocarpal  "injections. She states these are quite effective for a while after the injections. Patient was to continue wearing  She returns today for repeat radiocarpal injections. She has developed dorsal radial swelling over the carpus more pronounced on the right. She has developed locking/catching of the left long finger as well.    8/9/22: Patient was last seen 5/3/22 at which time I provided her with bilateral radiocarpal injections and new braces. She notes since last visit she sustained a fall around 5/4, and sustained nondisplaced fractures to left ring finger and right thumb for which she was treated by Dr. Miguel. She has healed fine from those, states thumb IP still a bit stiff. She has continued to have bilateral wrist pain and notes new onset of \"spasms\" in left hand with gripping/ grasping objections within the last 2-3 weeks. She states that her left small and ring fingers seem to lock and spasm with /finger flexion. She has to forcibly extend it sometimes to make them straight with pain. She states this must have happened 6-7 times over the last 2 months.      Objective:  There were no vitals taken for this visit.    General: alert, appropriate, NAD  HEENT: NC/AT  CV: RRR by pulse  Pulm: Unlabored on RA  MSK:  BUE   Volar and dorsal incisions c/d/i, no e/o infection  No tenderness of the right thumb  Moderate pain to palpation dorsal central wrists bilaterally  Mild effusion and capsular swelling over bilateral dorsal radial wrist over the carpus  +TTP over left index finger A1 pulley with mild crepitus, full flexion/extension, able to make full fist, no malrotation or scissoring. No visible triggering  Minimal tenderness over A1 pulley off left middle   There is mild crepitus felt over the small finger with flexion/extension - seems to be associated with the extensor tendon but it is variably reproducible  ROM baseline  Intact EPL/FPL/APB/HI  Diminished sensation median nerve distribution " baseline  Negative Tinel's at the left carpal tunnel, no crepitus, there is fullness of the volar wrist proximal to carpal tunnel likely consistent with flexor tenosynovitis  +TTP scaphoid tubercle where an osteophyte is felt.  WWP CR< 2s    Imaging:  None new. XRs of left ring finger and right hand from 5/4/2022 and 5/31/2022 reviewed.  These demonstrate nondisplaced fractures of the right thumb distal phalanx and left ring distal phalanx that have healed.      Repeat XRs of bilateral wrists 2/14/23 demonstrates Unchanged alignment and appearance of 4 corner fusion on the left with ulnar subluxation of the carpus. Significant volar carpal osteophytes. 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     EMG/NCS 10/21/21  severe right median neuropathy at the wrist (carpal tunnel syndrome); no significant findings median or ulnar nerve on the left     Ultrasound 6/15/23  Impression:      1. No sonographic evidence of left carpal tunnel syndrome.     2. Tenosynovitis of the flexor carpi radialis.     3. Normal sonographic appearance of flexor tendon motion within the carpal tunnel.    Assessment/Plan:  71F with bilateral radiocarpal arthritis. Right s/p hardware removal for second failed 4 corner fusion, and carpal tunnel release.    Patient's left hand cramping has improved. It is not consistent with carpal tunnel syndrome. EMG/NCS 10/21/21 also negative on the left. Ultrasound did not show any mechanical impingement.    She wishes to have bilateral radiocarpal injection today.    After obtaining written consent, I cleansed the skin over the dorsal wrists  with chlorhexidine.  I then anesthetized skin with ethyl chloride freeze spray after which I injected 1 cc of 1% lidocaine and 40 mg of Kenalog into the bilateral radiocarpal joint through the 3-4 portal.  The patient tolerated this well without complication.    Continue brace wear. Continue radiocarpal injections every 3-4  months for symptomatic control, until she decides that she wants to proceed with total wrist fusions.    Medium Joint Injection/Arthrocentesis: bilateral radiocarpal    Date/Time: 11/14/2023 11:20 AM    Performed by: Rolan Conley MD  Authorized by: Rolan Conley MD    Needle Size:  25 G  Guidance: surface landmarks    Location:  Wrist  Laterality:  Bilateral  Site:  Bilateral radiocarpal  Medications (Right):  40 mg triamcinolone 40 MG/ML; 1 mL lidocaine 1 %  Medications (Left):  40 mg triamcinolone 40 MG/ML; 1 mL lidocaine 1 %  Outcome:  Tolerated well, no immediate complications  Procedure discussed: discussed risks, benefits, and alternatives    Consent Given by:  Patient and parent  Timeout: timeout called immediately prior to procedure    Prep: patient was prepped and draped in usual sterile fashion        Rolan Conley MD    Hand, Upper Extremity & Microvascular Surgery  Department of Orthopedic Surgery  [facility]

## 2023-11-13 NOTE — PATIENT INSTRUCTIONS
Referrals:    Acupuncture Referral.  -Please call your insurance provider to find out about acupuncture coverage, being that not all policies cover acupuncture services.       Procedures:    Call to schedule your procedure: [phone] option #2  INJECTION, EPIDURAL, CAUDAL    Your pre-procedure instructions are below, please call our clinic if you have any questions.      Recommended Follow up:      Follow up in 3-4 months.       To speak with a nurse, schedule/reschedule/cancel a clinic appointment, or request a medication refill call: [phone].    You can also reach us by [employer]: [email or web address]      Procedure Information:     Please call [phone] option #2 to schedule, reschedule, or cancel your procedure appointment.   Phones are answered Monday - Friday from 08:00 - 4:30pm.  Leave a voicemail with your name, birth date, and phone number if no one is available to take your call.        You no longer need to test for COVID- 19 prior to your procedure/surgery, unless your physician specifically requests that you test. If you experience COVID symptoms or have tested positive for COVID-19 within 14 days of your scheduled surgery or procedure, please update our office right away and your procedure may have to be postponed.       The procedure center staff will call you several days before the procedure to review important information that you will need to know for the day of the procedure.     Please contact the clinic if you have further questions about this information [phone].        Information related to Scheduling and Pre-Procedure Instructions:    If you must reschedule your procedure more than two times, you must follow up in clinic before rescheduling again.    Preparing for your procedure    CAUTION - FAILURE TO FOLLOW THESE PRE-PROCEDURE INSTRUCTIONS WILL RESULT IN YOUR PROCEDURE BEING RESCHEDULED.    Your Procedure: INJECTION, EPIDURAL, CAUDAL        You must  have a  take you home after your procedure. Transportation by taxi or para-transit is okay as long as you have a responsible adult accompany you. You must provide your 's full name and contact number at time of check in.     Fasting Protocol Please have nothing to eat or drink 1 hour prior to arrival.     Medications If you take any medications, DO NOT STOP. Take your medications as usual the day of your procedure with a sip of water AT LEAST 2 HOURS PRIOR TO ARRIVAL.    Antibiotics If you are currently taking antibiotics, you must complete the entire dose 7 days prior to your scheduled procedure. You must be clear of any signs or symptoms of infection. If you begin antibiotics, please contact our clinic for instructions.     Fever, Chills, or Rash If you experience a fever of higher than 100 degrees, chills, rash, or open wounds during the one week before your procedure, please call the clinic to see if you may proceed with your procedure.      Medication Hold List  **Patients under Cardiology/Neurology care should consult their provider prior to the pain procedure to verify pre-procedure medication instructions. The information below contains general guidelines.**        Blood Thinners If you are taking daily ASPIRIN, PLAVIX, OR OTHER BLOOD THINNERS SUCH AS COUMADIN/WARFARIN, we will need your prescribing doctor to sign a release permitting you to stop these medications. Once approved by your prescribing doctor - STOP ALL BLOOD THINNERS BASED ON THE TIME TABLE BELOW PRIOR TO YOUR PROCEDURE. If you have been instructed to stop WARFARIN(COUMADIN), you must have an INR lab drawn the day before your procedure. Your INR must be within normal limits before we can perform your injection. MEDICATIONS CAN BE RESTARTED AFTER YOUR PROCEDURE.    24 HOUR HOLD  Lovenox (enoxaparin)  Agrylin (Anagrelide)    3 DAY HOLD  Xarelto (rivaroxaban)    5 DAY HOLD  Coumadin (Warfarin)  Brilinta (ticagrelor) 7 DAY HOLD  Anacin,  Bufferin, Ecotrin, Excedrin, Aggrenox (Aspirin)  Pradexa (Dabigatran)  Elmiron (Pentosan)  Plavix (Clopidogrel Bisulfate)  Pletal (Cilostazol)    10 DAY HOLD  Effient (Prasugel)    14 DAY HOLD  Ticlid (ticlopidine)        Non-steroidal Anti-inflammatories (NSAIDs) DO NOT TAKE any non-steroidal anti-inflammatory medications (NSAIDs) listed on the table below. MEDICATIONS CAN BE RESTARTED AFTER YOUR PROCEDURE. Celebrex is OK to take and does not need to be discontinued.     Medications to stop:  1 DAY HOLD  Advil, Motrin (Ibuprofen)  Voltaren (Diclofenac)  Toradol (Ketorolac)    3 DAY HOLD  Arthrotec (diciofenac sodium/misoprostol)  Clinoril (Sulindac)  Indocin (Indomethacin)  Lodine (Etodolac)  Vicoprofen (Hydrocodone and Ibuprofen)  Apixaban (Eliquis)    4 DAY HOLD  Mobic (Meloxicam)  Naprosyn (Naproxen)   7 DAY HOLD  Aleve (Naproxen sodium)  Darvon compound (contains aspirin)  Norgesic Forte (contains aspirin)  Oruvall (Ketoprofen)  Percodan (contains aspirin)  Relafen (Nabumetone)  Salsalate  Trilisate  Vitamin E (more than 400 mg per day)  Any medication containing aspirin    14 DAY HOLD  Daypro (Oxaprozin)  Feldene (Piroxicam)            To speak with a nurse, schedule/reschedule/cancel a clinic appointment, or request a medication refill call: [phone]    You can also reach us by [employer]: [email or web address]

## 2023-11-14 ENCOUNTER — OFFICE VISIT (OUTPATIENT)
Dept: ORTHOPEDICS | Facility: CLINIC | Age: 73
End: 2023-11-14
Payer: OTHER MISCELLANEOUS

## 2023-11-14 ENCOUNTER — HOSPITAL ENCOUNTER (OUTPATIENT)
Dept: CARDIOLOGY | Facility: CLINIC | Age: 73
Discharge: HOME | End: 2023-11-14
Attending: INTERNAL MEDICINE | Admitting: INTERNAL MEDICINE
Payer: COMMERCIAL

## 2023-11-14 VITALS — WEIGHT: 192 LBS | BODY MASS INDEX: 30.86 KG/M2 | HEIGHT: 66 IN

## 2023-11-14 DIAGNOSIS — M19.032 PRIMARY OSTEOARTHRITIS OF BOTH WRISTS: Primary | ICD-10-CM

## 2023-11-14 DIAGNOSIS — M19.031 PRIMARY OSTEOARTHRITIS OF BOTH WRISTS: Primary | ICD-10-CM

## 2023-11-14 DIAGNOSIS — R00.1 BRADYCARDIA: ICD-10-CM

## 2023-11-14 PROCEDURE — 93244 EXT ECG>48HR<7D REV&INTERPJ: CPT | Performed by: INTERNAL MEDICINE

## 2023-11-14 PROCEDURE — 93242 EXT ECG>48HR<7D RECORDING: CPT

## 2023-11-14 PROCEDURE — 20605 DRAIN/INJ JOINT/BURSA W/O US: CPT | Mod: 50 | Performed by: STUDENT IN AN ORGANIZED HEALTH CARE EDUCATION/TRAINING PROGRAM

## 2023-11-14 PROCEDURE — 99213 OFFICE O/P EST LOW 20 MIN: CPT | Mod: 25 | Performed by: STUDENT IN AN ORGANIZED HEALTH CARE EDUCATION/TRAINING PROGRAM

## 2023-11-14 RX ORDER — LIDOCAINE HYDROCHLORIDE 10 MG/ML
1 INJECTION, SOLUTION INFILTRATION; PERINEURAL
Status: DISCONTINUED | OUTPATIENT
Start: 2023-11-14 | End: 2024-02-16

## 2023-11-14 RX ORDER — LIDOCAINE HYDROCHLORIDE 10 MG/ML
1 INJECTION, SOLUTION INFILTRATION; PERINEURAL
Status: DISCONTINUED | OUTPATIENT
Start: 2023-11-14 | End: 2024-05-17

## 2023-11-14 RX ORDER — TRIAMCINOLONE ACETONIDE 40 MG/ML
40 INJECTION, SUSPENSION INTRA-ARTICULAR; INTRAMUSCULAR
Status: DISCONTINUED | OUTPATIENT
Start: 2023-11-14 | End: 2024-02-17

## 2023-11-14 RX ADMIN — LIDOCAINE HYDROCHLORIDE 1 ML: 10 INJECTION, SOLUTION INFILTRATION; PERINEURAL at 11:20

## 2023-11-14 RX ADMIN — TRIAMCINOLONE ACETONIDE 40 MG: 40 INJECTION, SUSPENSION INTRA-ARTICULAR; INTRAMUSCULAR at 11:20

## 2023-11-14 NOTE — LETTER
2023         RE: Nadira Perez  [address]  [facility] [address]        Dear Colleague,    Thank you for referring your patient, Nadira Perez, to the [facility] [place]. Please see a copy of my visit note below.    Orthopaedic Surgery Hand Clinic Progress Note    Patient Name: Nadira Perez  MRN: [id number]  : 1950  Date: 2023    Date of Surgery: 21    Surgery Performed: 1) Open right carpal tunnel release  2) Removal of buried implant (deep) right wrist    Subjective:     23: Patient was last seen 23. She received left radiocarpal injection at her last visit that provided relief for about 2 months. She would like both wrists injected today. She states the cramping of her fingers has improved.     23: Patient was last seen 23. She had ultrasound completed 6/15/23 and is here to review results today. Since last visit she reports continued spasms and cramping of the fingers in her left hand. She continues to have cramping/locking of her fingers with certain postures like putting on earrings or her bra. Seems to most affected the ulnar sided digits. She does not have classic pain or paresthesias in the median nerve distribution. No nocturnal symptoms. She has been wearing a wrist brace, using Tylenol and gabapentin.  She is interested in repeating the left radiocarpal  injection today.    23: Patient was last seen 23 at which time I provided bilateral radiocarpal injections for wrist OA and left middle finger trigger injection. She is still having relief from all three injections. MF triggering seems to have resolved. Main concern today is cramping of the fingers. Ongoing 6 months, worse over the last 2. She states the thumb, index, middle, and ring fingers seem to lock up with certain hand postures like when putting on earrings or wearing her bra. She does not have classic pain or paresthesias in the  "median nerve distribution. No nocturnal symptoms. She has been wearing a wrist brace, using Tylenol and gabapentin.    Update 2/14/2023 Patient last seen on 8/9/22. At which time patient had cortisone injection for A1 pulley of left ring finger and bilateral radiocarpal injections. She states these are quite effective for a while after the injections. Patient was to continue wearing  She returns today for repeat radiocarpal injections. She has developed dorsal radial swelling over the carpus more pronounced on the right. She has developed locking/catching of the left long finger as well.    8/9/22: Patient was last seen 5/3/22 at which time I provided her with bilateral radiocarpal injections and new braces. She notes since last visit she sustained a fall around 5/4, and sustained nondisplaced fractures to left ring finger and right thumb for which she was treated by Dr. Miguel. She has healed fine from those, states thumb IP still a bit stiff. She has continued to have bilateral wrist pain and notes new onset of \"spasms\" in left hand with gripping/ grasping objections within the last 2-3 weeks. She states that her left small and ring fingers seem to lock and spasm with /finger flexion. She has to forcibly extend it sometimes to make them straight with pain. She states this must have happened 6-7 times over the last 2 months.      Objective:  There were no vitals taken for this visit.    General: alert, appropriate, NAD  HEENT: NC/AT  CV: RRR by pulse  Pulm: Unlabored on RA  MSK:  BUE   Volar and dorsal incisions c/d/i, no e/o infection  No tenderness of the right thumb  Moderate pain to palpation dorsal central wrists bilaterally  Mild effusion and capsular swelling over bilateral dorsal radial wrist over the carpus  +TTP over left index finger A1 pulley with mild crepitus, full flexion/extension, able to make full fist, no malrotation or scissoring. No visible triggering  Minimal tenderness over A1 pulley off " left middle   There is mild crepitus felt over the small finger with flexion/extension - seems to be associated with the extensor tendon but it is variably reproducible  ROM baseline  Intact EPL/FPL/APB/HI  Diminished sensation median nerve distribution baseline  Negative Tinel's at the left carpal tunnel, no crepitus, there is fullness of the volar wrist proximal to carpal tunnel likely consistent with flexor tenosynovitis  +TTP scaphoid tubercle where an osteophyte is felt.  WWP CR< 2s    Imaging:  None new. XRs of left ring finger and right hand from 5/4/2022 and 5/31/2022 reviewed.  These demonstrate nondisplaced fractures of the right thumb distal phalanx and left ring distal phalanx that have healed.      Repeat XRs of bilateral wrists 2/14/23 demonstrates Unchanged alignment and appearance of 4 corner fusion on the left with ulnar subluxation of the carpus. Significant volar carpal osteophytes. 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     EMG/NCS 10/21/21  severe right median neuropathy at the wrist (carpal tunnel syndrome); no significant findings median or ulnar nerve on the left     Ultrasound 6/15/23  Impression:      1. No sonographic evidence of left carpal tunnel syndrome.     2. Tenosynovitis of the flexor carpi radialis.     3. Normal sonographic appearance of flexor tendon motion within the carpal tunnel.    Assessment/Plan:  71F with bilateral radiocarpal arthritis. Right s/p hardware removal for second failed 4 corner fusion, and carpal tunnel release.    Patient's left hand cramping has improved. It is not consistent with carpal tunnel syndrome. EMG/NCS 10/21/21 also negative on the left. Ultrasound did not show any mechanical impingement.    She wishes to have bilateral radiocarpal injection today.    After obtaining written consent, I cleansed the skin over the dorsal wrists  with chlorhexidine.  I then anesthetized skin with ethyl chloride freeze  spray after which I injected 1 cc of 1% lidocaine and 40 mg of Kenalog into the bilateral radiocarpal joint through the 3-4 portal.  The patient tolerated this well without complication.    Continue brace wear. Continue radiocarpal injections every 3-4 months for symptomatic control, until she decides that she wants to proceed with total wrist fusions.    Medium Joint Injection/Arthrocentesis: bilateral radiocarpal    Date/Time: 11/14/2023 11:20 AM    Performed by: Rolan Conley MD  Authorized by: Rolan Conley MD    Needle Size:  25 G  Guidance: surface landmarks    Location:  Wrist  Laterality:  Bilateral  Site:  Bilateral radiocarpal  Medications (Right):  40 mg triamcinolone 40 MG/ML; 1 mL lidocaine 1 %  Medications (Left):  40 mg triamcinolone 40 MG/ML; 1 mL lidocaine 1 %  Outcome:  Tolerated well, no immediate complications  Procedure discussed: discussed risks, benefits, and alternatives    Consent Given by:  Patient and parent  Timeout: timeout called immediately prior to procedure    Prep: patient was prepped and draped in usual sterile fashion        Rolan Conley MD    Hand, Upper Extremity & Microvascular Surgery  Department of Orthopedic Surgery  [facility]              Again, thank you for allowing me to participate in the care of your patient.        Sincerely,        Rolan Conley MD

## 2023-11-21 ENCOUNTER — TELEPHONE (OUTPATIENT)
Dept: ANESTHESIOLOGY | Facility: CLINIC | Age: 73
End: 2023-11-21
Payer: COMMERCIAL

## 2023-11-21 NOTE — TELEPHONE ENCOUNTER
Patient is scheduled for surgery with Dr. Umaña    Spoke with: patient    Date of Surgery: 01/02/24    Location: [facility]    Informed patient they will need an adult  yes    Additional comments: patient is aware date and time of th procedure.         Dory Bowman MA on 11/21/2023 at 10:59 AM

## 2023-11-28 DIAGNOSIS — E55.9 HYPOVITAMINOSIS D: ICD-10-CM

## 2023-11-30 ENCOUNTER — LAB (OUTPATIENT)
Dept: LAB | Facility: CLINIC | Age: 73
End: 2023-11-30
Payer: COMMERCIAL

## 2023-11-30 ENCOUNTER — OFFICE VISIT (OUTPATIENT)
Dept: INTERNAL MEDICINE | Facility: CLINIC | Age: 73
End: 2023-11-30
Payer: COMMERCIAL

## 2023-11-30 VITALS
OXYGEN SATURATION: 96 % | DIASTOLIC BLOOD PRESSURE: 60 MMHG | WEIGHT: 186.9 LBS | BODY MASS INDEX: 30.17 KG/M2 | SYSTOLIC BLOOD PRESSURE: 100 MMHG | HEART RATE: 89 BPM

## 2023-11-30 DIAGNOSIS — R73.09 ELEVATED HEMOGLOBIN A1C: ICD-10-CM

## 2023-11-30 DIAGNOSIS — E78.00 PURE HYPERCHOLESTEROLEMIA: ICD-10-CM

## 2023-11-30 DIAGNOSIS — M51.369 DDD (DEGENERATIVE DISC DISEASE), LUMBAR: ICD-10-CM

## 2023-11-30 DIAGNOSIS — M48.062 SPINAL STENOSIS OF LUMBAR REGION WITH NEUROGENIC CLAUDICATION: ICD-10-CM

## 2023-11-30 DIAGNOSIS — E03.9 HYPOTHYROIDISM, UNSPECIFIED TYPE: ICD-10-CM

## 2023-11-30 DIAGNOSIS — I10 BENIGN ESSENTIAL HYPERTENSION: ICD-10-CM

## 2023-11-30 DIAGNOSIS — S93.512A SPRAIN OF INTERPHALANGEAL JOINT OF LEFT GREAT TOE, INITIAL ENCOUNTER: ICD-10-CM

## 2023-11-30 DIAGNOSIS — Z12.11 SCREEN FOR COLON CANCER: Primary | ICD-10-CM

## 2023-11-30 DIAGNOSIS — R06.83 SNORING: ICD-10-CM

## 2023-11-30 DIAGNOSIS — Q17.9 CONGENITAL ABNORMALITY OF EAR: ICD-10-CM

## 2023-11-30 DIAGNOSIS — M79.2 NEUROPATHIC PAIN: ICD-10-CM

## 2023-11-30 DIAGNOSIS — I10 ESSENTIAL HYPERTENSION, BENIGN: ICD-10-CM

## 2023-11-30 DIAGNOSIS — Z29.11 NEED FOR VACCINATION AGAINST RESPIRATORY SYNCYTIAL VIRUS: ICD-10-CM

## 2023-11-30 LAB
ALP SERPL-CCNC: 91 U/L (ref 40–150)
ALT SERPL W P-5'-P-CCNC: 21 U/L (ref 0–50)
AST SERPL W P-5'-P-CCNC: 25 U/L (ref 0–45)
ERYTHROCYTE [DISTWIDTH] IN BLOOD BY AUTOMATED COUNT: 14.8 % (ref 10–15)
HBA1C MFR BLD: 6.5 %
HCT VFR BLD AUTO: 38.8 % (ref 35–47)
HGB BLD-MCNC: 12.6 G/DL (ref 11.7–15.7)
MCH RBC QN AUTO: 28.6 PG (ref 26.5–33)
MCHC RBC AUTO-ENTMCNC: 32.5 G/DL (ref 31.5–36.5)
MCV RBC AUTO: 88 FL (ref 78–100)
PLATELET # BLD AUTO: 301 10E3/UL (ref 150–450)
RBC # BLD AUTO: 4.4 10E6/UL (ref 3.8–5.2)
TSH SERPL DL<=0.005 MIU/L-ACNC: 2.07 UIU/ML (ref 0.3–4.2)
WBC # BLD AUTO: 8.7 10E3/UL (ref 4–11)

## 2023-11-30 PROCEDURE — 99215 OFFICE O/P EST HI 40 MIN: CPT | Performed by: NURSE PRACTITIONER

## 2023-11-30 PROCEDURE — 84460 ALANINE AMINO (ALT) (SGPT): CPT | Performed by: PATHOLOGY

## 2023-11-30 PROCEDURE — 36415 COLL VENOUS BLD VENIPUNCTURE: CPT | Performed by: PATHOLOGY

## 2023-11-30 PROCEDURE — 83036 HEMOGLOBIN GLYCOSYLATED A1C: CPT | Performed by: NURSE PRACTITIONER

## 2023-11-30 PROCEDURE — 84450 TRANSFERASE (AST) (SGOT): CPT | Performed by: PATHOLOGY

## 2023-11-30 PROCEDURE — 84443 ASSAY THYROID STIM HORMONE: CPT | Performed by: PATHOLOGY

## 2023-11-30 PROCEDURE — 84075 ASSAY ALKALINE PHOSPHATASE: CPT | Performed by: PATHOLOGY

## 2023-11-30 PROCEDURE — 85027 COMPLETE CBC AUTOMATED: CPT | Performed by: PATHOLOGY

## 2023-11-30 PROCEDURE — 99000 SPECIMEN HANDLING OFFICE-LAB: CPT | Performed by: PATHOLOGY

## 2023-11-30 PROCEDURE — 99417 PROLNG OP E/M EACH 15 MIN: CPT | Performed by: NURSE PRACTITIONER

## 2023-11-30 RX ORDER — AMLODIPINE BESYLATE 5 MG/1
5 TABLET ORAL DAILY
Qty: 90 TABLET | Refills: 3 | Status: SHIPPED | OUTPATIENT
Start: 2023-11-30 | End: 2024-06-10

## 2023-11-30 RX ORDER — GABAPENTIN 300 MG/1
1200 CAPSULE ORAL 3 TIMES DAILY
Qty: 1080 CAPSULE | Refills: 3 | Status: SHIPPED | OUTPATIENT
Start: 2023-11-30

## 2023-11-30 RX ORDER — ATORVASTATIN CALCIUM 80 MG/1
80 TABLET, FILM COATED ORAL DAILY
Qty: 90 TABLET | Refills: 3 | Status: SHIPPED | OUTPATIENT
Start: 2023-11-30

## 2023-11-30 RX ORDER — SPIRONOLACTONE 25 MG/1
50 TABLET ORAL DAILY
Qty: 180 TABLET | Refills: 3 | Status: SHIPPED | OUTPATIENT
Start: 2023-11-30 | End: 2024-02-16

## 2023-11-30 RX ORDER — HYDROCODONE BITARTRATE AND ACETAMINOPHEN 5; 325 MG/1; MG/1
TABLET ORAL
Qty: 30 TABLET | Refills: 0 | Status: SHIPPED | OUTPATIENT
Start: 2023-11-30 | End: 2024-02-20

## 2023-11-30 RX ORDER — RESPIRATORY SYNCYTIAL VIRUS VACCINE 120MCG/0.5
0.5 KIT INTRAMUSCULAR ONCE
Qty: 1 EACH | Refills: 0 | Status: CANCELLED | OUTPATIENT
Start: 2023-11-30 | End: 2023-11-30

## 2023-11-30 RX ORDER — LISINOPRIL 40 MG/1
40 TABLET ORAL DAILY
Qty: 90 TABLET | Refills: 3 | Status: SHIPPED | OUTPATIENT
Start: 2023-11-30 | End: 2024-06-10

## 2023-11-30 NOTE — PROGRESS NOTES
Ismael is a 73 year old that presents in clinic today for the following:     Chief Complaint   Patient presents with    Medicare Welness Exam     Lump on right shin.  Discuss sleep study  Discuss BP Medication. AMLODIPINE. Worsening left abdominal pain.  Medication refill.  Prescription for THC.  Discuss hiatal hernia.  Discuss getting colonoscopy.  Hearing aid issue.   Pace maker.           11/30/2023    10:35 AM   Additional Questions   Roomed by KTR       Screenings from encounters over the past 10 days    No data recorded       Ashlie Omer, HARPER at 10:39 AM on 11/30/2023

## 2023-12-01 RX ORDER — ACETAMINOPHEN 160 MG
TABLET,DISINTEGRATING ORAL
Qty: 270 CAPSULE | Refills: 3 | Status: SHIPPED | OUTPATIENT
Start: 2023-12-01 | End: 2024-09-12

## 2023-12-02 NOTE — PROGRESS NOTES
S: Nadira Perez is a 73 year old female here for her annual exam She has a list of topics to cover: PE including exam of lungs,kidneys,abdomen,back,wrists, peripheral neuropathy od legs, and a right anterior lower leg lesion.    She wants to discuss the following diagnostics: sleep study,CBC,electrolytes,, LFTs, TSH, colonoscopy and Dexa.     She wants following meds refilled: amlodipine, tizanidine, Vicodin and annual meds. She want a replacement for the amlodipine because of LE edema.    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    She has > 1 onth of a tender swelling over her right proximal lower extremity w/o known injury.    She is in the process of assessment for a cardiac pacemaker due to episodes of bradycardia.   She has hyperostosis of the jaw.  She is looking for a referral to an oral surgeon.      She has been receiving steroid injections in her wrists for pain.    She is due for a colonoscopy. Last colonoscopy 12/24/13.     She no longer needs mammograms and is followed [facility].    Dental and Eye exams are UTD.   Vaccines are UTD.       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     Corneal opacity     Hypothyroidism      Osteopenia, unspecified location     Aromatase inhibitor use     Chronic pain of right knee     DDD (degenerative disc disease), lumbar     Degenerative scoliosis in adult patient     Carpal tunnel syndrome of right wrist     Peripheral neuropathy     Spinal stenosis of lumbar region with neurogenic claudication     Spondylolisthesis of lumbar region     Brain lesion     Dermatochalasis of both upper eyelids     S/P spinal fusion     Chronic bilateral low back pain     PVD (posterior vitreous detachment), left eye     Vitreous opacities of left eye     Closed nondisplaced fracture of lateral malleolus of left fibula, initial encounter     Acute left ankle pain     Sacroiliitis (H24)     Lumbar radiculopathy     Lumbosacral radiculopathy            Past Medical History:   Diagnosis Date     Arthritis      Bone disease      Breast cancer (H)      H/O kyphoplasty      Hearing problem      History of kidney stones      History of radiation therapy      Hyperlipemia      Hypertension      Hypopotassemia      Irregular heart beat      Kidney problem      Lymph edema      Medullary sponge kidney      Osteopenia      PONV (postoperative nausea and vomiting)      Reduced vision      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     Thyroid disease             Past Surgical History:   Procedure Laterality Date     ABDOMEN SURGERY      ovarian cyst, mesh     ARTHRODESIS WRIST Right      ARTHRODESIS WRIST  02/14/2013    Procedure: ARTHRODESIS WRIST;  left wrist scaphoid excision, four bone fusion, iliac crest bone graft  ( Mac with block);  Surgeon: Av Mendez MD;  [facility]     BIOPSY      skin, vaginal     BLEPHAROPLASTY BILATERAL Bilateral 5/6/2022    Procedure: UPPER BLEPHAROPLASTY, BILATERAL;  Surgeon: Jemal Sanchez MD;  [place] [facility] [place]     CATARACT IOL, RT/LT Right 03/13/2018     CATARACT IOL, RT/LT Left 02/20/2018     COLONOSCOPY  12/24/2013    Procedure: COMBINED COLONOSCOPY, SINGLE  BIOPSY/POLYPECTOMY BY BIOPSY;  COLONOSCOPY;  Surgeon: Dom Alvarez MD;  Location:  GI     COSMETIC BLEPHAROPLASTY LOWER LIDS BILATERAL Bilateral 5/6/2022    Procedure: BLEPHAROPLASTY, LOWER EYELID, BILATERAL, COSMETIC;  Surgeon: Jemal Sanchez MD;  Location: [facility] OR     ESOPHAGOSCOPY, GASTROSCOPY, DUODENOSCOPY (EGD), COMBINED N/A 11/23/2016    Procedure: COMBINED ESOPHAGOSCOPY, GASTROSCOPY, DUODENOSCOPY (EGD);  Surgeon: Quinten Feliciano MD;  Location:  GI     EXTERNAL EAR SURGERY      right     EYE SURGERY      radial keratomy     FUSION, SPINE, INTERBODY, OBLIQUE ANTERIOR AND LUMBAR, 2 LEVELS, POST APPROACH, USING OTS N/A 11/18/2021    Procedure: Part 1: Oblique Anterior Interbody Fusion at Lumbar 5 to sacral 1 with use of Bone Morphogenic Protein,;  Surgeon: Serge Ramirez MD;  Location: [facility]     GRAFT BONE FROM ILIAC CREST  02/14/2013    Procedure: GRAFT BONE FROM ILIAC CREST;  mac with block and local infilitration;  Surgeon: Av Mendez MD;  [facility]     HC BREATH HYDROGEN TEST N/A 10/14/2016    Procedure: HYDROGEN BREATH TEST;  Surgeon: Cheri Barron MD;  Location:  GI     HERNIA REPAIR      umbilical age 18 mos.     HYSTERECTOMY TOTAL ABDOMINAL  05/03/2000     INJECT EPIDURAL CAUDAL N/A 9/12/2023    Procedure: Caudal epidural steroid injection with fluoroscopy;  Surgeon: Natasha Umaña MD;  Location: [facility] OR     INJECT EPIDURAL LUMBAR N/A 5/23/2023    Procedure: L3-4 interlaminar epidural steroid injection with fluoroscopy ( left> right);  Surgeon: Natasha Umaña MD;  Location: [facility] OR     INJECT JOINT SACROILIAC Left 4/27/2023    Procedure: Left sacroiliac joint steroid injection with fluoroscopy;  Surgeon: Natasha Umaña MD;  Location: [facility] OR     MASTECTOMY MODIFIED RADICAL Bilateral     bilateral; right breast prophylactic     OPTICAL TRACKING SYSTEM FUSION POSTERIOR SPINE LUMBAR N/A 11/18/2021    Procedure: Open Posterior Instrumented Spinal  Fusion at Lumbar 5 to Sacral 1, with grimm aguayo osteotomy, use of O-Arm/Stealth;  Surgeon: Serge Ramirez MD;  [facility]     PARATHYROIDECTOMY  09/23/2004    R SUPERIOR     PARATHYROIDECTOMY  09/23/2004    parathyroid resection, subtotal     RELEASE CARPAL TUNNEL Right 12/2/2021    Procedure: RIGHT CARPAL TUNNEL RELEASE, RIGHT WRIST HARDWARE REMOVAL, RIGHT CARPAL BOSS EXCISION;  Surgeon: Rolan Conley MD;  Location: [facility] OR     REMOVE HARDWARE HAND  09/24/2013    Procedure: REMOVE HARDWARE HAND;  Left Hand Screw Removal        RHINOPLASTY  1968     thyr proc skin closed cosmetic manner by subcuticular stitch  01/23/2009     THYROPLASTY  10/09/2009     TONSILLECTOMY  1977     VITRECTOMY PARSPLANA WITH 25 GAUGE SYSTEM Left 6/20/2022    Procedure: LEFT EYE VITRECTOMY, PARS PLANA APPROACH, USING 25-GAUGE INSTRUMENTS, laser;  Surgeon: Felix Carlos MD;  Location: [facility] OR     WRIST SURGERY      wrist arthrodesis          Social History     Tobacco Use     Smoking status: Never     Passive exposure: Never     Smokeless tobacco: Never   Substance Use Topics     Alcohol use: Not Currently     Alcohol/week: 7.0 standard drinks of alcohol     Types: 7 Glasses of wine per week     Comment: Rare to occasional        Family History   Problem Relation Age of Onset     Neurologic Disorder Mother         Anuerysm of Cerebral Artery, Dementia     Diabetes Mother      Thyroid Disease Mother         ,     Cerebrovascular Disease Mother      Dementia Mother      Osteoporosis Mother      Heart Disease Father         AAA     Hypertension Father      Circulatory Brother         Perihperal Neurophathy     Diabetes Maternal Grandmother      Asthma Maternal Grandmother      Chronic Obstructive Pulmonary Disease Maternal Grandfather         father     Asthma Maternal Grandfather      Diabetes Maternal Aunt         x2     Melanoma Maternal Aunt      Glaucoma Maternal Aunt      Breast Cancer Cousin      Dementia  Other      Cancer Other         malignant melanoma     Hypertension Other      Hypertension Other      Cerebrovascular Disease Other      Cerebrovascular Disease Other      Obesity Other      Respiratory Other      Cancer Other      Diabetes Other      Asthma Other      Macular Degeneration No family hx of      Coronary Artery Disease No family hx of      Hyperlipidemia No family hx of      Kidney Disease No family hx of      Thrombosis No family hx of      Arthritis No family hx of      Depression No family hx of      Mental Illness No family hx of      Substance Abuse No family hx of      Cystic Fibrosis No family hx of      Early Death No family hx of      Coronary Artery Disease Early Onset No family hx of      Heart Failure No family hx of      Bleeding Diathesis No family hx of      Ovarian Cancer No family hx of      Uterine Cancer No family hx of      Prostate Cancer No family hx of      Colorectal Cancer No family hx of      Pancreatic Cancer No family hx of      Lung Cancer No family hx of      Other Cancer No family hx of      Autoimmune Disease No family hx of      Unknown/Adopted No family hx of      Genetic Disorder No family hx of      Bleeding Disorder No family hx of      Clotting Disorder No family hx of      Anesthesia Reaction No family hx of                Allergies   Allergen Reactions     Erythromycin Nausea     Penicillins Hives     Around age 6 - doesn't recall full reaction, mother told her it was hives.     Has tolerated cephalsporins.             Current Outpatient Medications   Medication Sig Dispense Refill     acetaminophen (TYLENOL) 500 MG tablet Take 500-1,000 mg by mouth every 6 hours as needed for mild pain       amLODIPine (NORVASC) 5 MG tablet Take 1 tablet (5 mg) by mouth daily 90 tablet 3     Ascorbic Acid (VITAMIN C) 500 MG CHEW Take 1 tablet by mouth daily       atorvastatin (LIPITOR) 80 MG tablet Take 1 tablet (80 mg) by mouth daily 90 tablet 3     biotin 1000 MCG TABS  tablet Take 1,000 mcg by mouth daily       CRANBERRY EXTRACT PO Take 650 mg by mouth daily ~10 am  Takes 1       diclofenac (VOLTAREN) 1 % topical gel Apply to toe or other joints painful 100 g 3     gabapentin (NEURONTIN) 300 MG capsule Take 4 capsules (1,200 mg) by mouth 3 times daily 1080 capsule 3     HEMP OIL OR EXTRACT OR OTHER CBD CANNABINOID, NOT MEDICAL CANNABIS, THC       HYDROcodone-acetaminophen (NORCO) 5-325 MG tablet Take 1 tab for breakthrough pain 1-2 times a week. 30 tablet 0     levothyroxine (SYNTHROID/LEVOTHROID) 112 MCG tablet TAKE 1/2 TABLET BY MOUTH EVERY DAY **Call clinic to schedule follow up LABS [phone] **NEEDED FOR FURTHER REFILLS** 45 tablet 0     lisinopril (ZESTRIL) 40 MG tablet Take 1 tablet (40 mg) by mouth daily 90 tablet 3     magnesium 250 MG tablet Take 1 tablet by mouth daily       melatonin 5 MG tablet Take 10 mg by mouth At Bedtime        methocarbamol (ROBAXIN) 500 MG tablet Take 1 tablet (500 mg) by mouth 3 times daily 40 tablet 4     Multiple Vitamin (MULTI-VITAMIN) per tablet Take 1 tablet by mouth daily       naloxone (NARCAN) 4 MG/0.1ML nasal spray Spray 1 spray (4 mg) into one nostril alternating nostrils once as needed for opioid reversal every 2-3 minutes until assistance arrives 0.2 mL 0     ondansetron (ZOFRAN-ODT) 4 MG ODT tab Take 1 tablet (4 mg) by mouth every 12 hours as needed for nausea 180 tablet 3     spironolactone (ALDACTONE) 25 MG tablet Take 2 tablets (50 mg) by mouth daily 180 tablet 3     tiZANidine (ZANAFLEX) 4 MG tablet TAKE 0.5-1 TABLET BY MOUTH 3 TIMES DAILY 60 tablet 0     Vaginal Lubricant (REPHRESH) GEL Place 2 g vaginally every 3 days 2 g 3     Cholecalciferol (VITAMIN D3) 50 MCG (2000 UT) CAPS TAKE 3 CAPSULES (6,000 UNITS) BY MOUTH DAILY. 270 capsule 3       REVIEW OF SYSTEMS:  See above.    O:   /60 (BP Location: Right arm, Patient Position: Sitting, Cuff Size: Adult Regular)   Pulse 89   Wt 84.8 kg (186 lb 14.4 oz)   SpO2 96%    BMI 30.17 kg/m    GENERAL APPEARANCE: alert and no acute distress  EYES: Glasses, sclera white.   HENT: ear canals and TM's normal, she has hearing aids.  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  Mouth without ulcers.  She does have jethro exostosis of the upper and lower jaw.  RESP: lungs clear to auscultation - no rales, rhonchi or wheezes  CV: regular rates and rhythm, normal S1 S2, no S3 or S4 and no murmur, click or rub   ABDOMEN:  soft, nontender, no HSM or masses and bowel sounds normal  NEURO: alert and oriented.  Good historian.  MUSK: ambulatory with a walker.  SKIN: warm and dry.right proximal LE anterior fluctuant lesion of normal skin color measuring approx4 x 4 cm, slightly tender to palpation.  LYMPH: neg axillary, cervical, supra and infraclavicular nodes.  EXT: warm.  Edema: trace    PSYCHE: normal.    The 10-year ASCVD risk score (Nicole THAKKAR, et al., 2019) is: 8.1%    Values used to calculate the score:      Age: 73 years      Sex: Female      Is Non- : No      Diabetic: No      Tobacco smoker: No      Systolic Blood Pressure: 100 mmHg      Is BP treated: Yes      HDL Cholesterol: 67 mg/dL      Total Cholesterol: 167 mg/dL    A/P:  Ismael was seen today for medicare welness exam.    Diagnoses and all orders for this visit:    Screen for colon cancer  -     Colonoscopy Screening  Referral; Future    Need for vaccination against respiratory syncytial virus       -     This was obtained at a commercial pharmacy.    Benign essential hypertension  -   DC  amLODIPine (NORVASC) 5 MG tablet; Take 1 tablet (5 mg) by mouth daily due to ankle edema.  -     CBC with platelets; Future  -    Start  lisinopril (ZESTRIL) 40 MG tablet; Take 1 tablet (40 mg) by mouth daily.    Pure hypercholesterolemia  -     ALT; Future  -     AST; Future  -     Alkaline phosphatase; Future  -     atorvastatin (LIPITOR) 80 MG tablet; Take 1 tablet (80 mg) by mouth  daily    Hypothyroidism, unspecified type  -     TSH; Future    Elevated hemoglobin A1c  -     Hemoglobin A1c; Future    Snoring  -     Adult Sleep Eval & Management  Referral; Future    Neuropathic pain  -     gabapentin (NEURONTIN) 300 MG capsule; Take 4 capsules (1,200 mg) by mouth 3 times daily    Spinal stenosis of lumbar region with neurogenic claudication  -     HYDROcodone-acetaminophen (NORCO) 5-325 MG tablet; Take 1 tab for breakthrough pain 1-2 times a week.        -     Referral will be made for medical cannabis.       DDD (degenerative disc disease), lumbar  -     HYDROcodone-acetaminophen (NORCO) 5-325 MG tablet; Take 1 tab for breakthrough pain 1-2 times a week.    Essential hypertension, benign  -     spironolactone (ALDACTONE) 25 MG tablet; Take 2 tablets (50 mg) by mouth daily    Sprain of interphalangeal joint of left great toe, initial encounter  -     tiZANidine (ZANAFLEX) 4 MG tablet; TAKE 0.5-1 TABLET BY MOUTH 3 TIMES DAILY    Congenital abnormality of ear  -     Adult Plastic Surgery  Referral; Future    R anterior proxima; LE fluctuant swelling         -     She will return for drainage of the fluctuant lesion next week.  The patient voiced understanding of the information discussed and all questions were answered.     I spent a total of 70 minutes in the care of this pt during today's office visit. This time includes reviewing the patient's chart and prior history, obtaining a history, performing an examination and evaluation and counseling the patient. This time also includes ordering medications or tests necessarytiple referrals including a referral for medical cannabis. Time spent in documentation and care coordination is included.     Matilde LONG, CNP

## 2023-12-03 NOTE — PROGRESS NOTES
General Cardiology Clinic-[facility] [place]      Referring provider:Ashley Perry PA-C     HPI: Ms. Nadira Perez is a 73 year old  female with PMH significant for  -Hypertension  -Prediabetes  -Breast cancer survivor diagnosed in 2007 status post surgery, chemotherapy and radiation.  -Symptomatic frequent PVCs known since 2010    Patient was recently seen in cancer survivorship clinic on 11/2/2023 and reported dizziness and bradycardia.  Referred to cardiology.  Patient is a retired RN and she tells me that she has noticed her heart rate in 40s 50s.  She is concerned about her heart rate and she is wondering if she needs a pacemaker.  She reports feeling gas in your belly pushing up into her chest with discomfort and heartburn.  This is on and off.  Over the last couple of months.  It can last up to several hours.  Symptoms is worse when she is lying down.  Does not get worse with exertion.  Reports feeling dizzy which has been going on for years.  Feels PVCs.  No syncope.  She wore Zio patch for 2 weeks.  Result is not available at the time of this clinic visit (she noted the times that she felt dizzy while she was carrying the heart monitor).  She has known frequent PVCs since 2010.  At that time she was seen in cardiology clinic and underwent workup.  Coronary angiogram in 2009 showed no significant coronary artery disease.  Cardiac MRI showed low normal LV function.  There was a small area of wall thinning in the mid inferior wall.  She was on atenolol at that time.  Patient was recently seen in primary care clinic and she was recommended to increase spironolactone dose and stop metoprolol due to dizziness and high blood pressure.  Unfortunately she misunderstood and she increased the dose of spironolactone to 100 mg daily rather than 50 mg daily.  She is also telling me that since coming off of metoprolol she is feeling more PVCs.     Medications, personal, family, and social history reviewed with patient and  revised.    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    Thyroid disease        CURRENT MEDICATIONS:  Current Outpatient Medications   Medication Sig Dispense Refill    acetaminophen (TYLENOL) 500 MG tablet Take 500-1,000 mg by mouth every 6 hours as needed for mild pain      amLODIPine (NORVASC) 5 MG tablet Take 1 tablet (5 mg) by mouth daily 90 tablet 3    Ascorbic Acid (VITAMIN C) 500 MG CHEW Take 1 tablet by mouth daily      atorvastatin (LIPITOR) 80 MG tablet Take 1 tablet (80 mg) by mouth daily 90 tablet 3    biotin 1000 MCG TABS tablet Take 1,000 mcg by mouth daily      Cholecalciferol (VITAMIN D3) 50 MCG (2000 UT) CAPS TAKE 3 CAPSULES (6,000 UNITS) BY MOUTH DAILY. 270 capsule 3    CRANBERRY EXTRACT PO Take 650 mg by mouth daily ~10 am  Takes 1      diclofenac (VOLTAREN) 1 % topical gel Apply to toe or other joints painful 100 g 3    gabapentin (NEURONTIN) 300 MG capsule Take 4 capsules (1,200 mg) by mouth 3 times daily 1080 capsule 3    HEMP OIL OR EXTRACT OR OTHER CBD CANNABINOID, NOT MEDICAL CANNABIS, THC      HYDROcodone-acetaminophen (NORCO) 5-325 MG tablet Take 1 tab for breakthrough pain 1-2 times a week. 30 tablet 0    levothyroxine (SYNTHROID/LEVOTHROID) 112 MCG tablet TAKE 1/2 TABLET BY MOUTH EVERY DAY **Call clinic to schedule follow up LABS [phone] **NEEDED FOR FURTHER REFILLS** 45 tablet 0    lisinopril (ZESTRIL) 40 MG tablet Take 1 tablet (40 mg) by mouth daily 90 tablet 3    magnesium 250 MG tablet Take 1 tablet by mouth daily      melatonin 5 MG  tablet Take 10 mg by mouth At Bedtime       methocarbamol (ROBAXIN) 500 MG tablet Take 1 tablet (500 mg) by mouth 3 times daily 40 tablet 4    Multiple Vitamin (MULTI-VITAMIN) per tablet Take 1 tablet by mouth daily      naloxone (NARCAN) 4 MG/0.1ML nasal spray Spray 1 spray (4 mg) into one nostril alternating nostrils once as needed for opioid reversal every 2-3 minutes until assistance arrives 0.2 mL 0    ondansetron (ZOFRAN-ODT) 4 MG ODT tab Take 1 tablet (4 mg) by mouth every 12 hours as needed for nausea 180 tablet 3    spironolactone (ALDACTONE) 25 MG tablet Take 2 tablets (50 mg) by mouth daily 180 tablet 3    tiZANidine (ZANAFLEX) 4 MG tablet TAKE 0.5-1 TABLET BY MOUTH 3 TIMES DAILY 60 tablet 0    Vaginal Lubricant (REPHRESH) GEL Place 2 g vaginally every 3 days 2 g 3       PAST SURGICAL HISTORY:  Past Surgical History:   Procedure Laterality Date    ABDOMEN SURGERY      ovarian cyst, mesh    ARTHRODESIS WRIST Right     ARTHRODESIS WRIST  02/14/2013    Procedure: ARTHRODESIS WRIST;  left wrist scaphoid excision, four bone fusion, iliac crest bone graft  ( Mac with block);  Surgeon: Av Mendez MD;  Location:  OR    BIOPSY      skin, vaginal    BLEPHAROPLASTY BILATERAL Bilateral 5/6/2022    Procedure: UPPER BLEPHAROPLASTY, BILATERAL;  Surgeon: Jemal Sanchez MD;  Location: [facility] [place]    CATARACT IOL, RT/LT Right 03/13/2018    CATARACT IOL, RT/LT Left 02/20/2018    COLONOSCOPY  12/24/2013    Procedure: COMBINED COLONOSCOPY, SINGLE BIOPSY/POLYPECTOMY BY BIOPSY;  COLONOSCOPY;  Surgeon: Dom Alvarez MD;  Location: [facility]    COSMETIC BLEPHAROPLASTY LOWER LIDS BILATERAL Bilateral 5/6/2022    Procedure: BLEPHAROPLASTY, LOWER EYELID, BILATERAL, COSMETIC;  Surgeon: Jemal Sanchez MD;  Location: [facility] [place]    ESOPHAGOSCOPY, GASTROSCOPY, DUODENOSCOPY (EGD), COMBINED N/A 11/23/2016    Procedure: COMBINED ESOPHAGOSCOPY, GASTROSCOPY, DUODENOSCOPY (EGD);  Surgeon: Quinten Feliciano MD;  Location: [facility]     EXTERNAL EAR SURGERY      right    EYE SURGERY      radial keratomy    FUSION, SPINE, INTERBODY, OBLIQUE ANTERIOR AND LUMBAR, 2 LEVELS, POST APPROACH, USING OTS N/A 11/18/2021    Procedure: Part 1: Oblique Anterior Interbody Fusion at Lumbar 5 to sacral 1 with use of Bone Morphogenic Protein,;  Surgeon: Serge Ramirez MD;  Location: [facility] OR    GRAFT BONE FROM ILIAC CREST  02/14/2013    Procedure: GRAFT BONE FROM ILIAC CREST;  mac with block and local infilitration;  Surgeon: Av Mendez MD;  [facility]    HC BREATH HYDROGEN TEST N/A 10/14/2016    Procedure: HYDROGEN BREATH TEST;  Surgeon: Cheri Barron MD;  Location: [facility] GI    HERNIA REPAIR      umbilical age 18 mos.    HYSTERECTOMY TOTAL ABDOMINAL  05/03/2000    INJECT EPIDURAL CAUDAL N/A 9/12/2023    Procedure: Caudal epidural steroid injection with fluoroscopy;  Surgeon: Natasha Umaña MD;  Location: [facility] OR    INJECT EPIDURAL LUMBAR N/A 5/23/2023    Procedure: L3-4 interlaminar epidural steroid injection with fluoroscopy ( left> right);  Surgeon: Natasha Umaña MD;  Location: [facility] OR    INJECT JOINT SACROILIAC Left 4/27/2023    Procedure: Left sacroiliac joint steroid injection with fluoroscopy;  Surgeon: Natasha Umaña MD;  Location: [facility] OR    MASTECTOMY MODIFIED RADICAL Bilateral     bilateral; right breast prophylactic    OPTICAL TRACKING SYSTEM FUSION POSTERIOR SPINE LUMBAR N/A 11/18/2021    Procedure: Open Posterior Instrumented Spinal Fusion at Lumbar 5 to Sacral 1, with grimm aguayo osteotomy, use of O-Arm/Stealth;  Surgeon: Serge Ramirez MD;  Location: [facility] OR    PARATHYROIDECTOMY  09/23/2004    R SUPERIOR    PARATHYROIDECTOMY  09/23/2004    parathyroid resection, subtotal    RELEASE CARPAL TUNNEL Right 12/2/2021    Procedure: RIGHT CARPAL TUNNEL RELEASE, RIGHT WRIST HARDWARE REMOVAL, RIGHT CARPAL BOSS EXCISION;  Surgeon: Rolan Conley MD;  Location: [facility] OR    REMOVE HARDWARE HAND  09/24/2013     Procedure: REMOVE HARDWARE HAND;  Left Hand Screw Removal       RHINOPLASTY  1968    thyr proc skin closed cosmetic manner by subcuticular stitch  01/23/2009    THYROPLASTY  10/09/2009    TONSILLECTOMY  1977    VITRECTOMY PARSPLANA WITH 25 GAUGE SYSTEM Left 6/20/2022    Procedure: LEFT EYE VITRECTOMY, PARS PLANA APPROACH, USING 25-GAUGE INSTRUMENTS, laser;  Surgeon: Felix Carlos MD;  Location: [facility] OR    WRIST SURGERY      wrist arthrodesis       ALLERGIES:     Allergies   Allergen Reactions    Erythromycin Nausea    Penicillins Hives     Around age 6 - doesn't recall full reaction, mother told her it was hives.     Has tolerated cephalsporins.        FAMILY HISTORY:  Family History   Problem Relation Age of Onset    Neurologic Disorder Mother         Anuerysm of Cerebral Artery, Dementia    Diabetes Mother     Thyroid Disease Mother         ,    Cerebrovascular Disease Mother     Dementia Mother     Osteoporosis Mother     Heart Disease Father         AAA    Hypertension Father     Circulatory Brother         Perihperal Neurophathy    Diabetes Maternal Grandmother     Asthma Maternal Grandmother     Chronic Obstructive Pulmonary Disease Maternal Grandfather         father    Asthma Maternal Grandfather     Diabetes Maternal Aunt         x2    Melanoma Maternal Aunt     Glaucoma Maternal Aunt     Breast Cancer Cousin     Dementia Other     Cancer Other         malignant melanoma    Hypertension Other     Hypertension Other     Cerebrovascular Disease Other     Cerebrovascular Disease Other     Obesity Other     Respiratory Other     Cancer Other     Diabetes Other     Asthma Other     Macular Degeneration No family hx of     Coronary Artery Disease No family hx of     Hyperlipidemia No family hx of     Kidney Disease No family hx of     Thrombosis No family hx of     Arthritis No family hx of     Depression No family hx of     Mental Illness No family hx of     Substance Abuse No family hx of      Cystic Fibrosis No family hx of     Early Death No family hx of     Coronary Artery Disease Early Onset No family hx of     Heart Failure No family hx of     Bleeding Diathesis No family hx of     Ovarian Cancer No family hx of     Uterine Cancer No family hx of     Prostate Cancer No family hx of     Colorectal Cancer No family hx of     Pancreatic Cancer No family hx of     Lung Cancer No family hx of     Other Cancer No family hx of     Autoimmune Disease No family hx of     Unknown/Adopted No family hx of     Genetic Disorder No family hx of     Bleeding Disorder No family hx of     Clotting Disorder No family hx of     Anesthesia Reaction No family hx of          SOCIAL HISTORY:  Social History     Tobacco Use    Smoking status: Never     Passive exposure: Never    Smokeless tobacco: Never   Substance Use Topics    Alcohol use: Not Currently     Alcohol/week: 7.0 standard drinks of alcohol     Types: 7 Glasses of wine per week     Comment: Rare to occasional    Drug use: Yes     Types: Marijuana       ROS:   Constitutional: No fever, chills, or sweats. Weight stable.   Cardiovascular: As per HPI.       Exam:  /67 (BP Location: Right arm, Patient Position: Chair, Cuff Size: Adult Regular)   Pulse 100   SpO2 97%   GENERAL APPEARANCE: alert and no distress  HEENT: no icterus, no central cyanosis  LYMPH/NECK: no adenopathy, no asymmetry, JVP not elevated, no carotid bruits.  RESPIRATORY: lungs clear to auscultation - no rales, rhonchi or wheezes, no use of accessory muscles, no retractions, respirations are unlabored, normal respiratory rate  CARDIOVASCULAR: regular rhythm with PVCs, normal S1, S2, no S3 or S4 and no murmur, click or rub, precordium quiet with normal PMI.  GI: soft, non tender  EXTREMITIES: peripheral pulses normal, no edema  NEURO: alert, normal speech,and affect  SKIN: no ecchymoses, no rashes     I have reviewed the labs and personally reviewed the imaging below and made my comment in  the assessment and plan.    Labs:  CBC RESULTS:   Lab Results   Component Value Date    WBC 8.7 11/30/2023    WBC 7.9 05/07/2021    RBC 4.40 11/30/2023    RBC 4.04 05/07/2021    HGB 12.6 11/30/2023    HGB 11.5 (L) 05/07/2021    HCT 38.8 11/30/2023    HCT 36.8 05/07/2021    MCV 88 11/30/2023    MCV 91 05/07/2021    MCH 28.6 11/30/2023    MCH 28.5 05/07/2021    MCHC 32.5 11/30/2023    MCHC 31.3 (L) 05/07/2021    RDW 14.8 11/30/2023    RDW 14.5 05/07/2021     11/30/2023     05/07/2021       BMP RESULTS:  Lab Results   Component Value Date     11/09/2023     05/07/2021     05/07/2021    POTASSIUM 4.2 11/09/2023    POTASSIUM 4.3 07/16/2022    POTASSIUM 2.6 (LL) 11/18/2021    POTASSIUM 3.2 (L) 05/07/2021    POTASSIUM 3.3 (L) 05/07/2021    CHLORIDE 106 11/09/2023    CHLORIDE 108 01/12/2022    CHLORIDE 105 05/07/2021    CHLORIDE 107 05/07/2021    CO2 24 11/09/2023    CO2 27 01/12/2022    CO2 31 05/07/2021    CO2 30 05/07/2021    ANIONGAP 11 11/09/2023    ANIONGAP 9 01/12/2022    ANIONGAP 4 05/07/2021    ANIONGAP 5 05/07/2021     (H) 11/09/2023    GLC 96 01/12/2022    GLC 99 05/07/2021    GLC 98 05/07/2021    BUN 19.7 11/09/2023    BUN 19 01/12/2022    BUN 21 05/07/2021    BUN 22 05/07/2021    CR 0.77 11/09/2023    CR 0.73 05/07/2021    CR 0.74 05/07/2021    GFRESTIMATED 82 11/09/2023    GFRESTIMATED 85 05/07/2021    GFRESTIMATED 82 05/07/2021    GFRESTBLACK >90 05/07/2021    GFRESTBLACK >90 05/07/2021    RAMBO 10.0 11/09/2023    RAMBO 9.6 05/07/2021    RAMBO 9.5 05/07/2021            Echocardiogram 2019  Left ventricular function, chamber size, wall motion, and wall thickness are  normal.The EF is 60-65%.  Right ventricular function, chamber size, wall motion, and thickness are  normal.  No significant valvular abnormalities were noted.     Compared to the previous study from 6/29/2009, there has been no significant  change. PVCs observed during the study.    Prior cardiac workup    1.  Coronary angiogram: 2/5/09: normal EF 66%-small inferior WMA, no signif CAD,  reduced diastolic LV compliance-may be adding to dyspnea  2. Holter showed frequent PVC's 1/23/09 which correlated with symptoms of dizziness/SOB  3. Cardiac MRI 1/22/09 showed low normal LV function. There was a small region of wall thinning and abnormal wall motion in mid-inferior wall.  There was a 3 cm RV aneurysm noted that had good mobility.      Assessment and Plan:   Patient is being seen today for presumed bradycardia.  I reviewed patient's EKG which shows right bundle branch block, sinus rate 92 bpm with frequent PVCs.  She recently came off metoprolol and started noticing more PVCs.  I have explained to the patient that at times she has bigeminal PVCs which erroneously seems like bradycardia.  Actually she does not have sinus bradycardia and she does not need pacemaker.  I would like her to restart metoprolol that she has been on for several years.  In the meantime patient's blood test today show BARB with creatinine worsening likely due to overdiuresis from her misunderstanding of the spironolactone dose.  We will give diuretic holiday today and tomorrow hopefully with recheck BMP next week.  She will hydrate herself in the meantime.    # Frequent PVCs  -Patient symptomatic.  -Restart Toprol 50 mg daily which she has been on for several years.  -Check echocardiogram  -Will review Zio patch when it is available    # Hypertension  # Dehydration secondary to overdiuresis (she misunderstood and started taking spironolactone 100 mg daily)  -Hold off on spironolactone today and tomorrow  -Restart spironolactone 25 mg twice daily 12/6.    -Recheck BMP in 1 weeks  -Continue amlodipine 5 mg daily    Return to clinic as needed.    Total time spent today for this visit is 45 minutes including precharting, face-to-face clinic visit, review of labs/imaging and medical documentation.    Radha YARBROUGH MD  [facility] Division of  Cardiology  Pager [phone]

## 2023-12-04 ENCOUNTER — OFFICE VISIT (OUTPATIENT)
Dept: CARDIOLOGY | Facility: CLINIC | Age: 73
End: 2023-12-04
Attending: PHYSICIAN ASSISTANT
Payer: COMMERCIAL

## 2023-12-04 ENCOUNTER — LAB (OUTPATIENT)
Dept: LAB | Facility: CLINIC | Age: 73
End: 2023-12-04
Payer: COMMERCIAL

## 2023-12-04 VITALS — DIASTOLIC BLOOD PRESSURE: 67 MMHG | HEART RATE: 100 BPM | OXYGEN SATURATION: 97 % | SYSTOLIC BLOOD PRESSURE: 108 MMHG

## 2023-12-04 DIAGNOSIS — Z91.89 AT RISK FOR CARDIOMYOPATHY: ICD-10-CM

## 2023-12-04 DIAGNOSIS — I10 ESSENTIAL HYPERTENSION, BENIGN: ICD-10-CM

## 2023-12-04 DIAGNOSIS — N17.9 AKI (ACUTE KIDNEY INJURY) (H): ICD-10-CM

## 2023-12-04 DIAGNOSIS — I49.3 FREQUENT PVCS: Primary | ICD-10-CM

## 2023-12-04 DIAGNOSIS — I10 HYPERTENSION, WELL CONTROLLED: ICD-10-CM

## 2023-12-04 DIAGNOSIS — R00.1 BRADYCARDIA: ICD-10-CM

## 2023-12-04 LAB
ANION GAP SERPL CALCULATED.3IONS-SCNC: 14 MMOL/L (ref 7–15)
BUN SERPL-MCNC: 36.2 MG/DL (ref 8–23)
CALCIUM SERPL-MCNC: 10.4 MG/DL (ref 8.8–10.2)
CHLORIDE SERPL-SCNC: 101 MMOL/L (ref 98–107)
CREAT SERPL-MCNC: 1.34 MG/DL (ref 0.51–0.95)
DEPRECATED HCO3 PLAS-SCNC: 22 MMOL/L (ref 22–29)
EGFRCR SERPLBLD CKD-EPI 2021: 42 ML/MIN/1.73M2
GLUCOSE SERPL-MCNC: 101 MG/DL (ref 70–99)
POTASSIUM SERPL-SCNC: 5 MMOL/L (ref 3.4–5.3)
SODIUM SERPL-SCNC: 137 MMOL/L (ref 135–145)
TSH SERPL DL<=0.005 MIU/L-ACNC: 2.03 UIU/ML (ref 0.3–4.2)

## 2023-12-04 PROCEDURE — 99204 OFFICE O/P NEW MOD 45 MIN: CPT | Performed by: INTERNAL MEDICINE

## 2023-12-04 PROCEDURE — 93010 ELECTROCARDIOGRAM REPORT: CPT | Performed by: INTERNAL MEDICINE

## 2023-12-04 PROCEDURE — G0463 HOSPITAL OUTPT CLINIC VISIT: HCPCS | Performed by: INTERNAL MEDICINE

## 2023-12-04 PROCEDURE — 36415 COLL VENOUS BLD VENIPUNCTURE: CPT | Performed by: PATHOLOGY

## 2023-12-04 PROCEDURE — 93005 ELECTROCARDIOGRAM TRACING: CPT

## 2023-12-04 PROCEDURE — 80048 BASIC METABOLIC PNL TOTAL CA: CPT | Performed by: PATHOLOGY

## 2023-12-04 PROCEDURE — 84443 ASSAY THYROID STIM HORMONE: CPT | Performed by: PATHOLOGY

## 2023-12-04 RX ORDER — METOPROLOL SUCCINATE 50 MG/1
50 TABLET, EXTENDED RELEASE ORAL DAILY
Qty: 90 TABLET | Refills: 3 | Status: SHIPPED | OUTPATIENT
Start: 2023-12-04 | End: 2024-06-10

## 2023-12-04 ASSESSMENT — PAIN SCALES - GENERAL: PAINLEVEL: NO PAIN (0)

## 2023-12-04 NOTE — NURSING NOTE
Chief Complaint   Patient presents with    New Patient     New Bradycardia     Vitals were taken and medications reconciled.    Stewart Goodrich, EMT  5:15 PM

## 2023-12-04 NOTE — PATIENT INSTRUCTIONS
Complete an echocardiogram (ultrasound of heart)    Begin taking metoprolol 50 mg daily tomorrow - RX sent to pharmacy    Hold your spironolactone for 2 days.   Starting on Wednesday, resume taking 25 mg twice daily    In one week : blood draw at lab for BMP    As soon as results are compiled and reviewed, you will be notified.

## 2023-12-04 NOTE — LETTER
12/4/2023      RE: Nadira Perez  [id number]  [facility] [id number]       Dear Colleague,    Thank you for the opportunity to participate in the care of your patient, Nadira Perez, at the [facility] [place] at [facility]. Please see a copy of my visit note below.           General Cardiology Clinic-[facility] [place]      Referring provider:Ashley Perry PA-C     HPI: Ms. Nadira Perez is a 73 year old  female with PMH significant for  -Hypertension  -Prediabetes  -Breast cancer survivor diagnosed in 2007 status post surgery, chemotherapy and radiation.  -Symptomatic frequent PVCs known since 2010    Patient was recently seen in cancer survivorship clinic on 11/2/2023 and reported dizziness and bradycardia.  Referred to cardiology.  Patient is a retired RN and she tells me that she has noticed her heart rate in 40s 50s.  She is concerned about her heart rate and she is wondering if she needs a pacemaker.  She reports feeling gas in your belly pushing up into her chest with discomfort and heartburn.  This is on and off.  Over the last couple of months.  It can last up to several hours.  Symptoms is worse when she is lying down.  Does not get worse with exertion.  Reports feeling dizzy which has been going on for years.  Feels PVCs.  No syncope.  She wore Zio patch for 2 weeks.  Result is not available at the time of this clinic visit (she noted the times that she felt dizzy while she was carrying the heart monitor).  She has known frequent PVCs since 2010.  At that time she was seen in cardiology clinic and underwent workup.  Coronary angiogram in 2009 showed no significant coronary artery disease.  Cardiac MRI showed low normal LV function.  There was a small area of wall thinning in the mid inferior wall.  She was on atenolol at that time.  Patient was recently seen in primary care clinic and she was recommended to increase spironolactone  dose and stop metoprolol due to dizziness and high blood pressure.  Unfortunately she misunderstood and she increased the dose of spironolactone to 100 mg daily rather than 50 mg daily.  She is also telling me that since coming off of metoprolol she is feeling more PVCs.     Medications, personal, family, and social history reviewed with patient and revised.    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    Thyroid disease        CURRENT MEDICATIONS:  Current Outpatient Medications   Medication Sig Dispense Refill    acetaminophen (TYLENOL) 500 MG tablet Take 500-1,000 mg by mouth every 6 hours as needed for mild pain      amLODIPine (NORVASC) 5 MG tablet Take 1 tablet (5 mg) by mouth daily 90 tablet 3    Ascorbic Acid (VITAMIN C) 500 MG CHEW Take 1 tablet by mouth daily      atorvastatin (LIPITOR) 80 MG tablet Take 1 tablet (80 mg) by mouth daily 90 tablet 3    biotin 1000 MCG TABS tablet Take 1,000 mcg by mouth daily      Cholecalciferol (VITAMIN D3) 50 MCG (2000 UT) CAPS TAKE 3 CAPSULES (6,000 UNITS) BY MOUTH DAILY. 270 capsule 3    CRANBERRY EXTRACT PO Take 650 mg by mouth daily ~10 am  Takes 1      diclofenac (VOLTAREN) 1 % topical gel Apply to toe or other joints painful 100 g 3    gabapentin (NEURONTIN) 300 MG capsule Take 4 capsules (1,200 mg) by mouth 3 times daily 1080 capsule 3    HEMP OIL OR EXTRACT OR OTHER CBD CANNABINOID, NOT MEDICAL CANNABIS, THC      HYDROcodone-acetaminophen (NORCO) 5-325 MG tablet Take 1 tab for breakthrough pain 1-2 times a week. 30 tablet  0    levothyroxine (SYNTHROID/LEVOTHROID) 112 MCG tablet TAKE 1/2 TABLET BY MOUTH EVERY DAY **Call clinic to schedule follow up LABS [phone] **NEEDED FOR FURTHER REFILLS** 45 tablet 0    lisinopril (ZESTRIL) 40 MG tablet Take 1 tablet (40 mg) by mouth daily 90 tablet 3    magnesium 250 MG tablet Take 1 tablet by mouth daily      melatonin 5 MG tablet Take 10 mg by mouth At Bedtime       methocarbamol (ROBAXIN) 500 MG tablet Take 1 tablet (500 mg) by mouth 3 times daily 40 tablet 4    Multiple Vitamin (MULTI-VITAMIN) per tablet Take 1 tablet by mouth daily      naloxone (NARCAN) 4 MG/0.1ML nasal spray Spray 1 spray (4 mg) into one nostril alternating nostrils once as needed for opioid reversal every 2-3 minutes until assistance arrives 0.2 mL 0    ondansetron (ZOFRAN-ODT) 4 MG ODT tab Take 1 tablet (4 mg) by mouth every 12 hours as needed for nausea 180 tablet 3    spironolactone (ALDACTONE) 25 MG tablet Take 2 tablets (50 mg) by mouth daily 180 tablet 3    tiZANidine (ZANAFLEX) 4 MG tablet TAKE 0.5-1 TABLET BY MOUTH 3 TIMES DAILY 60 tablet 0    Vaginal Lubricant (REPHRESH) GEL Place 2 g vaginally every 3 days 2 g 3       PAST SURGICAL HISTORY:  Past Surgical History:   Procedure Laterality Date    ABDOMEN SURGERY      ovarian cyst, mesh    ARTHRODESIS WRIST Right     ARTHRODESIS WRIST  02/14/2013    Procedure: ARTHRODESIS WRIST;  left wrist scaphoid excision, four bone fusion, iliac crest bone graft  ( Mac with block);  Surgeon: Av Mendez MD;  [facility]    BIOPSY      skin, vaginal    BLEPHAROPLASTY BILATERAL Bilateral 5/6/2022    Procedure: UPPER BLEPHAROPLASTY, BILATERAL;  Surgeon: Jemal Sanchez MD;  Location: [facility] OR    CATARACT IOL, RT/LT Right 03/13/2018    CATARACT IOL, RT/LT Left 02/20/2018    COLONOSCOPY  12/24/2013    Procedure: COMBINED COLONOSCOPY, SINGLE BIOPSY/POLYPECTOMY BY BIOPSY;  COLONOSCOPY;  Surgeon: Dom Alvarez MD;  Location:  GI    COSMETIC BLEPHAROPLASTY  LOWER LIDS BILATERAL Bilateral 5/6/2022    Procedure: BLEPHAROPLASTY, LOWER EYELID, BILATERAL, COSMETIC;  Surgeon: Jemal Sanchez MD;  Location: [facility] OR    ESOPHAGOSCOPY, GASTROSCOPY, DUODENOSCOPY (EGD), COMBINED N/A 11/23/2016    Procedure: COMBINED ESOPHAGOSCOPY, GASTROSCOPY, DUODENOSCOPY (EGD);  Surgeon: Quinten Feliciano MD;  Location:  GI    EXTERNAL EAR SURGERY      right    EYE SURGERY      radial keratomy    FUSION, SPINE, INTERBODY, OBLIQUE ANTERIOR AND LUMBAR, 2 LEVELS, POST APPROACH, USING OTS N/A 11/18/2021    Procedure: Part 1: Oblique Anterior Interbody Fusion at Lumbar 5 to sacral 1 with use of Bone Morphogenic Protein,;  Surgeon: Serge Ramirez MD;  [facility]: [facility]    GRAFT BONE FROM ILIAC CREST  02/14/2013    Procedure: GRAFT BONE FROM ILIAC CREST;  mac with block and local infilitration;  Surgeon: Av Mendez MD;  [facility]    HC BREATH HYDROGEN TEST N/A 10/14/2016    Procedure: HYDROGEN BREATH TEST;  Surgeon: Cheri Barron MD;  Location:  GI    HERNIA REPAIR      umbilical age 18 mos.    HYSTERECTOMY TOTAL ABDOMINAL  05/03/2000    INJECT EPIDURAL CAUDAL N/A 9/12/2023    Procedure: Caudal epidural steroid injection with fluoroscopy;  Surgeon: Natasha Umaña MD;  Location: [facility] OR    INJECT EPIDURAL LUMBAR N/A 5/23/2023    Procedure: L3-4 interlaminar epidural steroid injection with fluoroscopy ( left> right);  Surgeon: Natasha Umaña MD;  Location: [facility] OR    INJECT JOINT SACROILIAC Left 4/27/2023    Procedure: Left sacroiliac joint steroid injection with fluoroscopy;  Surgeon: Natasha Umaña MD;  Location: [facility] OR    MASTECTOMY MODIFIED RADICAL Bilateral     bilateral; right breast prophylactic    OPTICAL TRACKING SYSTEM FUSION POSTERIOR SPINE LUMBAR N/A 11/18/2021    Procedure: Open Posterior Instrumented Spinal Fusion at Lumbar 5 to Sacral 1, with grimm aguayo osteotomy, use of O-Arm/Stealth;  Surgeon: Serge Ramirez MD;   [facility]    PARATHYROIDECTOMY  09/23/2004    R SUPERIOR    PARATHYROIDECTOMY  09/23/2004    parathyroid resection, subtotal    RELEASE CARPAL TUNNEL Right 12/2/2021    Procedure: RIGHT CARPAL TUNNEL RELEASE, RIGHT WRIST HARDWARE REMOVAL, RIGHT CARPAL BOSS EXCISION;  Surgeon: Rolan Conley MD;  Location: [facility] OR    REMOVE HARDWARE HAND  09/24/2013    Procedure: REMOVE HARDWARE HAND;  Left Hand Screw Removal       RHINOPLASTY  1968    thyr proc skin closed cosmetic manner by subcuticular stitch  01/23/2009    THYROPLASTY  10/09/2009    TONSILLECTOMY  1977    VITRECTOMY PARSPLANA WITH 25 GAUGE SYSTEM Left 6/20/2022    Procedure: LEFT EYE VITRECTOMY, PARS PLANA APPROACH, USING 25-GAUGE INSTRUMENTS, laser;  Surgeon: Felix Carlos MD;  Location: [facility] OR    WRIST SURGERY      wrist arthrodesis       ALLERGIES:     Allergies   Allergen Reactions    Erythromycin Nausea    Penicillins Hives     Around age 6 - doesn't recall full reaction, mother told her it was hives.     Has tolerated cephalsporins.        FAMILY HISTORY:  Family History   Problem Relation Age of Onset    Neurologic Disorder Mother         Anuerysm of Cerebral Artery, Dementia    Diabetes Mother     Thyroid Disease Mother         ,    Cerebrovascular Disease Mother     Dementia Mother     Osteoporosis Mother     Heart Disease Father         AAA    Hypertension Father     Circulatory Brother         Perihperal Neurophathy    Diabetes Maternal Grandmother     Asthma Maternal Grandmother     Chronic Obstructive Pulmonary Disease Maternal Grandfather         father    Asthma Maternal Grandfather     Diabetes Maternal Aunt         x2    Melanoma Maternal Aunt     Glaucoma Maternal Aunt     Breast Cancer Cousin     Dementia Other     Cancer Other         malignant melanoma    Hypertension Other     Hypertension Other     Cerebrovascular Disease Other     Cerebrovascular Disease Other     Obesity Other     Respiratory Other     Cancer Other      Diabetes Other     Asthma Other     Macular Degeneration No family hx of     Coronary Artery Disease No family hx of     Hyperlipidemia No family hx of     Kidney Disease No family hx of     Thrombosis No family hx of     Arthritis No family hx of     Depression No family hx of     Mental Illness No family hx of     Substance Abuse No family hx of     Cystic Fibrosis No family hx of     Early Death No family hx of     Coronary Artery Disease Early Onset No family hx of     Heart Failure No family hx of     Bleeding Diathesis No family hx of     Ovarian Cancer No family hx of     Uterine Cancer No family hx of     Prostate Cancer No family hx of     Colorectal Cancer No family hx of     Pancreatic Cancer No family hx of     Lung Cancer No family hx of     Other Cancer No family hx of     Autoimmune Disease No family hx of     Unknown/Adopted No family hx of     Genetic Disorder No family hx of     Bleeding Disorder No family hx of     Clotting Disorder No family hx of     Anesthesia Reaction No family hx of          SOCIAL HISTORY:  Social History     Tobacco Use    Smoking status: Never     Passive exposure: Never    Smokeless tobacco: Never   Substance Use Topics    Alcohol use: Not Currently     Alcohol/week: 7.0 standard drinks of alcohol     Types: 7 Glasses of wine per week     Comment: Rare to occasional    Drug use: Yes     Types: Marijuana       ROS:   Constitutional: No fever, chills, or sweats. Weight stable.   Cardiovascular: As per HPI.       Exam:  /67 (BP Location: Right arm, Patient Position: Chair, Cuff Size: Adult Regular)   Pulse 100   SpO2 97%   GENERAL APPEARANCE: alert and no distress  HEENT: no icterus, no central cyanosis  LYMPH/NECK: no adenopathy, no asymmetry, JVP not elevated, no carotid bruits.  RESPIRATORY: lungs clear to auscultation - no rales, rhonchi or wheezes, no use of accessory muscles, no retractions, respirations are unlabored, normal respiratory rate  CARDIOVASCULAR:  regular rhythm with PVCs, normal S1, S2, no S3 or S4 and no murmur, click or rub, precordium quiet with normal PMI.  GI: soft, non tender  EXTREMITIES: peripheral pulses normal, no edema  NEURO: alert, normal speech,and affect  SKIN: no ecchymoses, no rashes     I have reviewed the labs and personally reviewed the imaging below and made my comment in the assessment and plan.    Labs:  CBC RESULTS:   Lab Results   Component Value Date    WBC 8.7 11/30/2023    WBC 7.9 05/07/2021    RBC 4.40 11/30/2023    RBC 4.04 05/07/2021    HGB 12.6 11/30/2023    HGB 11.5 (L) 05/07/2021    HCT 38.8 11/30/2023    HCT 36.8 05/07/2021    MCV 88 11/30/2023    MCV 91 05/07/2021    MCH 28.6 11/30/2023    MCH 28.5 05/07/2021    MCHC 32.5 11/30/2023    MCHC 31.3 (L) 05/07/2021    RDW 14.8 11/30/2023    RDW 14.5 05/07/2021     11/30/2023     05/07/2021       BMP RESULTS:  Lab Results   Component Value Date     11/09/2023     05/07/2021     05/07/2021    POTASSIUM 4.2 11/09/2023    POTASSIUM 4.3 07/16/2022    POTASSIUM 2.6 (LL) 11/18/2021    POTASSIUM 3.2 (L) 05/07/2021    POTASSIUM 3.3 (L) 05/07/2021    CHLORIDE 106 11/09/2023    CHLORIDE 108 01/12/2022    CHLORIDE 105 05/07/2021    CHLORIDE 107 05/07/2021    CO2 24 11/09/2023    CO2 27 01/12/2022    CO2 31 05/07/2021    CO2 30 05/07/2021    ANIONGAP 11 11/09/2023    ANIONGAP 9 01/12/2022    ANIONGAP 4 05/07/2021    ANIONGAP 5 05/07/2021     (H) 11/09/2023    GLC 96 01/12/2022    GLC 99 05/07/2021    GLC 98 05/07/2021    BUN 19.7 11/09/2023    BUN 19 01/12/2022    BUN 21 05/07/2021    BUN 22 05/07/2021    CR 0.77 11/09/2023    CR 0.73 05/07/2021    CR 0.74 05/07/2021    GFRESTIMATED 82 11/09/2023    GFRESTIMATED 85 05/07/2021    GFRESTIMATED 82 05/07/2021    GFRESTBLACK >90 05/07/2021    GFRESTBLACK >90 05/07/2021    RAMBO 10.0 11/09/2023    RAMBO 9.6 05/07/2021    RAMBO 9.5 05/07/2021            Echocardiogram 2019  Left ventricular function, chamber size,  wall motion, and wall thickness are  normal.The EF is 60-65%.  Right ventricular function, chamber size, wall motion, and thickness are  normal.  No significant valvular abnormalities were noted.     Compared to the previous study from 6/29/2009, there has been no significant  change. PVCs observed during the study.    Prior cardiac workup    1. Coronary angiogram: 2/5/09: normal EF 66%-small inferior WMA, no signif CAD,  reduced diastolic LV compliance-may be adding to dyspnea  2. Holter showed frequent PVC's 1/23/09 which correlated with symptoms of dizziness/SOB  3. Cardiac MRI 1/22/09 showed low normal LV function. There was a small region of wall thinning and abnormal wall motion in mid-inferior wall.  There was a 3 cm RV aneurysm noted that had good mobility.      Assessment and Plan:   Patient is being seen today for presumed bradycardia.  I reviewed patient's EKG which shows right bundle branch block, sinus rate 92 bpm with frequent PVCs.  She recently came off metoprolol and started noticing more PVCs.  I have explained to the patient that at times she has bigeminal PVCs which erroneously seems like bradycardia.  Actually she does not have sinus bradycardia and she does not need pacemaker.  I would like her to restart metoprolol that she has been on for several years.  In the meantime patient's blood test today show BARB with creatinine worsening likely due to overdiuresis from her misunderstanding of the spironolactone dose.  We will give diuretic holiday today and tomorrow hopefully with recheck BMP next week.  She will hydrate herself in the meantime.    # Frequent PVCs  -Patient symptomatic.  -Restart Toprol 50 mg daily which she has been on for several years.  -Check echocardiogram  -Will review Zio patch when it is available    # Hypertension  # Dehydration secondary to overdiuresis (she misunderstood and started taking spironolactone 100 mg daily)  -Hold off on spironolactone today and  tomorrow  -Restart spironolactone 25 mg twice daily 12/6.    -Recheck BMP in 1 weeks  -Continue amlodipine 5 mg daily    Return to clinic as needed.    Total time spent today for this visit is 45 minutes including precharting, face-to-face clinic visit, review of labs/imaging and medical documentation.    Radha YARBROUGH MD  [facility] Division of Cardiology  Pager [phone]

## 2023-12-05 ENCOUNTER — THERAPY VISIT (OUTPATIENT)
Dept: PHYSICAL THERAPY | Facility: CLINIC | Age: 73
End: 2023-12-05
Attending: PHYSICIAN ASSISTANT
Payer: COMMERCIAL

## 2023-12-05 ENCOUNTER — MYC REFILL (OUTPATIENT)
Dept: ORTHOPEDICS | Facility: CLINIC | Age: 73
End: 2023-12-05

## 2023-12-05 DIAGNOSIS — L90.5 SCAR CONDITION AND FIBROSIS OF SKIN: ICD-10-CM

## 2023-12-05 DIAGNOSIS — I89.0 LYMPHEDEMA OF LEFT UPPER EXTREMITY: Primary | ICD-10-CM

## 2023-12-05 DIAGNOSIS — I89.0 LYMPHEDEMA: ICD-10-CM

## 2023-12-05 DIAGNOSIS — M25.561 RIGHT KNEE PAIN, UNSPECIFIED CHRONICITY: ICD-10-CM

## 2023-12-05 LAB
ATRIAL RATE - MUSE: 92 BPM
DIASTOLIC BLOOD PRESSURE - MUSE: NORMAL MMHG
INTERPRETATION ECG - MUSE: NORMAL
P AXIS - MUSE: 68 DEGREES
PR INTERVAL - MUSE: 178 MS
QRS DURATION - MUSE: 122 MS
QT - MUSE: 374 MS
QTC - MUSE: 462 MS
R AXIS - MUSE: 229 DEGREES
SYSTOLIC BLOOD PRESSURE - MUSE: NORMAL MMHG
T AXIS - MUSE: 57 DEGREES
VENTRICULAR RATE- MUSE: 92 BPM

## 2023-12-05 PROCEDURE — 97112 NEUROMUSCULAR REEDUCATION: CPT | Mod: GP | Performed by: PHYSICAL THERAPIST

## 2023-12-05 PROCEDURE — 97140 MANUAL THERAPY 1/> REGIONS: CPT | Mod: GP | Performed by: PHYSICAL THERAPIST

## 2023-12-05 PROCEDURE — 97162 PT EVAL MOD COMPLEX 30 MIN: CPT | Mod: GP | Performed by: PHYSICAL THERAPIST

## 2023-12-05 NOTE — PROGRESS NOTES
PHYSICAL THERAPY EVALUATION  Type of Visit: Evaluation    See electronic medical record for Abuse and Falls Screening details.    Subjective       Presenting condition or subjective complaint: Renew visits to manage my lymphedema.  Date of onset: 11/02/23 (date of order Ashley Perry PA-C; Survivorship clinic)    Relevant medical history: Anemia; Arthritis; Bladder or bowel problems; Cancer; Dizziness; Fibromyalgia; Foot drop; Hearing problems; Heart problems; Hepatitis; High blood pressure; History of fractures; Menopause; Osteoarthritis; Osteoporosis; Overweight; Pain at night or rest; Progressive neurological deficits; Radiation treatment; Severe dizziness; Thyroid problems; Vision problems  pt stating exacerbation of L UE lyphema due to pain of wrists, falls with fracture and inability to transition to planned garments and bandaging 1x/week.; awaiting cardiology updates.     Dates & types of surgery: Multiple (see my chart); s/p cortisone injections in B wrists about 2 weeks ago; s/p L mastecomy with ALND +13/33, 2007; chemo and radiation L chest and supraclavicular and L internal mammary chain LN, collowed by 14 years endocrine therapy; [facility]; neuropahties see Dr. Centeno; Edema of R LE since wearing AFO. Last Edema therapy 1/5/2023-3/30/2023 x 12 sessions    Prior therapy history for the same diagnosis, illness or injury: Yes Multiple, all with practitioner I'm seeing today.    Prior Level of Function  Transfers: Independent use of UE support   Ambulation: Assistive equipmentstates has never fallen when using walker or cane; 50' with walking  ADL: Independent  IADL:  assist with house cleaning and yard maintenance  GYM; pilates reformer 1x/week; stationary bike 2-3days/week    Living Environment  Social support: Alone   [place] [facility]   Stairs to enter the home: Yes       Help at home: Home management tasks (cooking, cleaning); Home and Yard maintenance tasks  Equipment  owned: Straight Cane; Walker with wheels; Bath bench ;    owns Stevia First size III longe sleeve and medium gautnlet 20-30m,mmhg 3/16/2022 and using [employer] Easy slide; owns custom Juzo class 1 sleeve and gauntlet (several years old), L UE circaid velcro compression wraps, Flexitouch compression pump; consider light arthritis glove size medium L hand; scheduled March 8th with fitter; may benefit from max size III based on current measurement; may benefit from size large Sigvaris donning glove; owns compression knee highs       Employment: Yes Self-employed RN specialist.  Hobbies/Interests: Reading, old movies, collecting    Patient goals for therapy: Miscellaneous, especially R/T my legs.    Pain assessment: Pain present     Objective       EDEMA EVALUATION  Additional history:  Body part affected by edema: Left arm and both legs  If cancer related, treatment: Chemo, surgery and radiation  If not cancer related, problems with veins or cause of swelling: No problems  Distance able to walk: 50 feet with wheeled walker  Time able to stand: 5 minutes and then I have to sit  Sensation problems in hands/feet: Yes Pain, numbness and tingling in both feet/lower legs  Edema etiology: Cancer with lymph node dissection, Chemo, Radiation, Surgery, also with chronic arthritis multiple joints    FUNCTIONAL SCALES  LLIS: 8    Cognitive Status Examination  Orientation: Oriented to person, place and time   Level of Consciousness: Alert  Follows Commands and Answers Questions: 100% of the time  Personal Safety and Judgement: Intact    EDEMA  Skin Condition: Dryness, Intact, Non-pitting, Pitting; demonstrating nonpitting L UE puffy edema througout L UE with limited involvment of L hand; R>L lower leg with 2+ pitting.  Capillary Refill: Symmetrical  Stemmer Sign: -  Ulceration: No    GIRTH MEASUREMENTS: Refer to separate girth measurement flowsheet.     VOLUME UE  Right UE (mL) To 40cm: 2028ml   Left UE (mL) 2386ml   UE Volume Comparison LUMALINDA  volume greater than RUE volume   % Difference 17.7%; previously 9% 3/2023   VOLUME LE  Right LE (mL)    Left LE (mL)    LE Volume Comparison RLE volume greater than LLE volume   % Difference 2+ distal pretibial   Head/Neck Volume     Trunk Volume    Genital Volume       RANGE OF MOTION:  seated shoulder flexion B = 140; mild top of L shoulder pain   STRENGTH:  : R = 39lbs; L= 25lbs  POSTURE:  mild forward head and B shoulder IR protractions  BED MOBILITY:  sleeping in regular bed  TRANSFERS:  use of UE support  GAIT/LOCOMOTION: limited distances   BALANCE:  unable to perform SLS  SENSATION:  decreased B feet; hands    Assessment & Plan   CLINICAL IMPRESSIONS  Medical Diagnosis: lymphedema    Treatment Diagnosis: lymphedema, fibrosis, pain, impaired balance, decreased strength   Impression/Assessment: Patient is a 73 year old female with exacerbation of L UE lymphedema  complaints.  The following significant findings have been identified: Pain, Decreased ROM/flexibility, Decreased strength, Impaired balance, Edema, Impaired gait, Impaired muscle performance, Decreased activity tolerance, and Impaired posture. These impairments interfere with their ability to perform self care tasks, work tasks, recreational activities, household chores, household mobility, and community mobility as compared to previous level of function.     Clinical Decision Making (Complexity):  Clinical Presentation: worsening edema  Clinical Presentation Rationale: based on medical and personal factors listed in PT evaluation  Clinical Decision Making (Complexity): Moderate complexity    PLAN OF CARE  Treatment Interventions:  Interventions: Manual Therapy, Neuromuscular Re-education, Therapeutic Exercise    Long Term Goals     PT Goal 1  Goal Identifier: Home program  Goal Description: Patient will be independent in home program to manage conditions of lymphedema and fibrosis, impaired balance.  Rationale: to maximize safety and independence  with performance of ADLs and functional tasks;to maximize safety and independence within the home;to maximize safety and independence within the community;to maximize safety and independence with transportation;to maximize safety and independence with self cares  Target Date: 03/04/24  PT Goal 2  Goal Identifier: UE Edema / Volume  Goal Description: Patient will report decreased volume of L  UE  by 5% and demonstrate stable volumes to decrease risk of infection.  Rationale: to maximize safety and independence with performance of ADLs and functional tasks;to maximize safety and independence within the home;to maximize safety and independence within the community;to maximize safety and independence with transportation;to maximize safety and independence with self cares  Target Date: 02/03/24  PT Goal 3  Goal Identifier: LLIS  Goal Description: Patient will demonstrate an 5 point decrease in LLIS to demonstrate a decreased impact of lymphedema on function.  Rationale: to maximize safety and independence with self cares  Target Date: 03/04/24      Frequency of Treatment: 1x/week x 6 then 2x/month x 2 months  Duration of Treatment: 90 days    Education Assessment:   Learner/Method: Patient    Risks and benefits of evaluation/treatment have been explained.   Patient/Family/caregiver agrees with Plan of Care.     Evaluation Time:     PT Eval, Moderate Complexity Minutes (31468): 15     Signing Clinician: Jennifer Jauregui, ELVIA      [facility]                                                                                   OUTPATIENT PHYSICAL THERAPY      PLAN OF TREATMENT FOR OUTPATIENT REHABILITATION   Patient's Last Name, First Name, Nadira Meyers YOB: 1950   Provider's Name   [facility]   Medical Record No.  [id number]     Onset Date: 11/02/23 (date of order Ashley Perry PA-C; Survivorship clinic)  Start of Care Date: 12/05/23     Medical  Diagnosis:  lymphedema      PT Treatment Diagnosis:  lymphedema, fibrosis, pain, impaired balance, decreased strength Plan of Treatment  Frequency/Duration: 1x/week x 6 then 2x/month x 2 months/ 90 days    Certification date from 12/05/23 to 03/04/24         See note for plan of treatment details and functional goals     Jennifer Jauregui, PT                         I CERTIFY THE NEED FOR THESE SERVICES FURNISHED UNDER        THIS PLAN OF TREATMENT AND WHILE UNDER MY CARE     (Physician attestation of this document indicates review and certification of the therapy plan).              Referring Provider:  Ashley Perry    Initial Assessment  See Epic Evaluation- Start of Care Date: 12/05/23

## 2023-12-06 DIAGNOSIS — K21.00 GASTROESOPHAGEAL REFLUX DISEASE WITH ESOPHAGITIS WITHOUT HEMORRHAGE: Primary | ICD-10-CM

## 2023-12-06 NOTE — TELEPHONE ENCOUNTER
Prescription refill requested for:   diclofenac (VOLTAREN) 1 % topical gel       Last Written Prescription Date:  7/20/23  Last Fill Quantity: 100g,   # refills: 3  Last Office Visit: 10/20/23 (Dr. Beau Morgan); 7/20/23 (Dr. Toth)  Future Office visit:           Jessenia Ramos ATC

## 2023-12-11 ENCOUNTER — TELEPHONE (OUTPATIENT)
Dept: GASTROENTEROLOGY | Facility: CLINIC | Age: 73
End: 2023-12-11
Payer: COMMERCIAL

## 2023-12-11 ENCOUNTER — MYC MEDICAL ADVICE (OUTPATIENT)
Dept: CARDIOLOGY | Facility: CLINIC | Age: 73
End: 2023-12-11
Payer: COMMERCIAL

## 2023-12-11 DIAGNOSIS — I49.3 FREQUENT PVCS: Primary | ICD-10-CM

## 2023-12-11 NOTE — TELEPHONE ENCOUNTER
"Endoscopy Scheduling Screen    Have you had a positive Covid test in the last 14 days?  No    Are you active on MyChart?   Yes    What insurance is in the chart?  Other:  BCBS/MEDICARE    Ordering/Referring Provider: Matilde Quiñonez APRN CNP     (If ordering provider performs procedure, schedule with ordering provider unless otherwise instructed. )    BMI: Estimated body mass index is 30.17 kg/m  as calculated from the following:    Height as of 11/14/23: 1.676 m (5' 6\").    Weight as of 11/30/23: 84.8 kg (186 lb 14.4 oz).     Sedation Ordered  moderate sedation.   If patient BMI > 50 do not schedule in [facility].    If patient BMI > 45 do not schedule at [facility].    Are you taking methadone or Suboxone?  No    Are you taking any prescription medications for pain 3 or more times per week?   YES, Colon/Combined: RN review needed to determine if MAC is required.  (RN Review required.)     Do you have a history of malignant hyperthermia or adverse reaction to anesthesia?  No    (Females) Are you currently pregnant?   No     Have you been diagnosed or told you have pulmonary hypertension?   No    Do you have an LVAD?  No    Have you been told you have moderate to severe sleep apnea?  No    Have you been told you have COPD, asthma, or any other lung disease?  No    Do you have any heart conditions?  Yes     In the past 6 months, have you had any hospitalizations for heart related issues including cardiomyopathy, heart attack, or stent placement?  No    Do you have any implantable devices in your body (pacemaker, ICD)?  No    Do you take nitroglycerine?  No    Have you ever had an organ transplant?   No    Have you ever had or are you awaiting a heart or lung transplant?   No    Have you had a stroke or transient ischemic attack (TIA aka \"mini stroke\" in the last 6 months?   No    Have you been diagnosed with or been told you have cirrhosis of the liver?   No    Are you currently on dialysis?   No    Do you need assistance " "transferring?   No    BMI: Estimated body mass index is 30.17 kg/m  as calculated from the following:    Height as of 11/14/23: 1.676 m (5' 6\").    Weight as of 11/30/23: 84.8 kg (186 lb 14.4 oz).     Is patients BMI > 40 and scheduling location [facility]?  No    Do you take an injectable medication for weight loss or diabetes (excluding insulin)?  No    Do you take the medication Naltrexone?  No    Do you take blood thinners?  No       Prep   Are you currently on dialysis or do you have chronic kidney disease?  No    Do you have a diagnosis of diabetes?  No    Do you have a diagnosis of cystic fibrosis (CF)?  No    On a regular basis do you go 3 -5 days between bowel movements?  No    BMI > 40?  No    Preferred Pharmacy:      [facility] [id number] [place] [facility] [address] [facility]  [address] [facility] [address]  Phone: [phone] Fax: [phone]      Final Scheduling Details   Colonoscopy prep sent?  Golytely Extended Prep    Procedure scheduled  Colonoscopy    Surgeon:       Date of procedure:  3/12     Pre-OP / PAC:   No - Not required for this site.    Location  [facility] - Per RN assessment.    Sedation   MAC/Deep Sedation - Per RN assessment.      Patient Reminders:   You will receive a call from a Nurse to review instructions and health history.  This assessment must be completed prior to your procedure.  Failure to complete the Nurse assessment may result in the procedure being cancelled.      On the day of your procedure, please designate an adult(s) who can drive you home stay with you for the next 24 hours. The medicines used in the exam will make you sleepy. You will not be able to drive.      You cannot take public transportation, ride share services, or non-medical taxi service without a responsible caregiver.  Medical transport services are allowed with the requirement that a responsible caregiver will receive you at your destination.  We require that drivers and caregivers are " confirmed prior to your procedure.      Additional Notes: Patient request to schedule EGD as well but no referral. Informed patient to connect with ordering physician for EGD referral. Patient expressed understanding, for now only Colonoscopy is schedule.

## 2023-12-11 NOTE — TELEPHONE ENCOUNTER
"Pre Assessment RN Review    Focused Assessments      LOLY Hx  Estimated body mass index is 30.17 kg/m  as calculated from the following:    Height as of 11/14/23: 1.676 m (5' 6\").    Weight as of 11/30/23: 84.8 kg (186 lb 14.4 oz).     Patient has reported / documented history LOLY and reports she does not use a a device for sleep.     Pt has suspected LOLY. Will see sleep medicine in March. (New, loud snoring). She wants to schedule at the [facility]. Will send for review.     Scheduling Status & Recommendations    Delay scheduling, awaiting clinical input. Message sent to [facility].       Pre Assessment RN Review    Focused Assessments    Pain Medication Review    Per scheduling questionnaire, patient reports taking prescription pain medication three or more times per week.    Per chart review, patient does have an active prescription for an opioid medication. Prescribed Medication(s): Norco      Scheduling Status & Recommendations    Sedation: MAC/Deep Sedation - Per RN assessment.  Prep: Golytely Extended Prep - Per RN assessment.      Sheree Anaya, RN   Endoscopy Procedure Pre Assessment RN       "

## 2023-12-12 ENCOUNTER — TELEPHONE (OUTPATIENT)
Dept: GASTROENTEROLOGY | Facility: CLINIC | Age: 73
End: 2023-12-12
Payer: COMMERCIAL

## 2023-12-12 DIAGNOSIS — K21.9 GASTROESOPHAGEAL REFLUX DISEASE WITHOUT ESOPHAGITIS: Primary | ICD-10-CM

## 2023-12-12 NOTE — TELEPHONE ENCOUNTER
Pt approved for [facility].     Staff message sent to scheduling.     Sheree Anaya, RN   Endoscopy Procedure Pre Assessment RN

## 2023-12-12 NOTE — TELEPHONE ENCOUNTER
Patient is currently scheduled for both an upper and lower endoscopy per Brian Lepe's ok, but we currently only have a valid referral for the colonoscopy. A message has been sent to referring provider to resubmit a valid referral for the pt's upper endoscopy.

## 2023-12-14 NOTE — TELEPHONE ENCOUNTER
One time refill for Tizanidine for patient.    Tim Flores RN     plan of care explained/repositioned/side rails up/wait time explained

## 2023-12-19 ENCOUNTER — LAB (OUTPATIENT)
Dept: LAB | Facility: CLINIC | Age: 73
End: 2023-12-19
Payer: COMMERCIAL

## 2023-12-19 ENCOUNTER — OFFICE VISIT (OUTPATIENT)
Dept: INTERNAL MEDICINE | Facility: CLINIC | Age: 73
End: 2023-12-19
Payer: COMMERCIAL

## 2023-12-19 VITALS — HEART RATE: 79 BPM | SYSTOLIC BLOOD PRESSURE: 137 MMHG | OXYGEN SATURATION: 97 % | DIASTOLIC BLOOD PRESSURE: 75 MMHG

## 2023-12-19 DIAGNOSIS — I10 ESSENTIAL HYPERTENSION, BENIGN: Primary | ICD-10-CM

## 2023-12-19 DIAGNOSIS — Z91.89 AT RISK FOR CARDIOMYOPATHY: ICD-10-CM

## 2023-12-19 DIAGNOSIS — Z29.11 NEED FOR VACCINATION AGAINST RESPIRATORY SYNCYTIAL VIRUS: ICD-10-CM

## 2023-12-19 DIAGNOSIS — I10 ESSENTIAL HYPERTENSION, BENIGN: ICD-10-CM

## 2023-12-19 PROBLEM — E11.9 DIABETES MELLITUS, TYPE 2 (H): Status: ACTIVE | Noted: 2023-12-19

## 2023-12-19 LAB
ANION GAP SERPL CALCULATED.3IONS-SCNC: 12 MMOL/L (ref 7–15)
BUN SERPL-MCNC: 17.8 MG/DL (ref 8–23)
CALCIUM SERPL-MCNC: 10 MG/DL (ref 8.8–10.2)
CHLORIDE SERPL-SCNC: 105 MMOL/L (ref 98–107)
CREAT SERPL-MCNC: 0.86 MG/DL (ref 0.51–0.95)
DEPRECATED HCO3 PLAS-SCNC: 23 MMOL/L (ref 22–29)
EGFRCR SERPLBLD CKD-EPI 2021: 72 ML/MIN/1.73M2
GLUCOSE SERPL-MCNC: 134 MG/DL (ref 70–99)
POTASSIUM SERPL-SCNC: 4.1 MMOL/L (ref 3.4–5.3)
SODIUM SERPL-SCNC: 140 MMOL/L (ref 135–145)

## 2023-12-19 PROCEDURE — 36415 COLL VENOUS BLD VENIPUNCTURE: CPT | Performed by: PATHOLOGY

## 2023-12-19 PROCEDURE — 99214 OFFICE O/P EST MOD 30 MIN: CPT | Performed by: NURSE PRACTITIONER

## 2023-12-19 PROCEDURE — 80048 BASIC METABOLIC PNL TOTAL CA: CPT | Performed by: PATHOLOGY

## 2023-12-19 RX ORDER — RESPIRATORY SYNCYTIAL VIRUS VACCINE 120MCG/0.5
0.5 KIT INTRAMUSCULAR ONCE
Qty: 1 EACH | Refills: 0 | Status: CANCELLED | OUTPATIENT
Start: 2023-12-19 | End: 2023-12-19

## 2023-12-19 NOTE — PROGRESS NOTES
Ismael is a 73 year old that presents in clinic today for the following:     Chief Complaint   Patient presents with    Follow Up     Pt here for follow up questions from last physical and wants to discuss aspirating lump on leg           12/19/2023     3:53 PM   Additional Questions   Roomed by MJL, EMT       Screenings from encounters over the past 10 days    No data recorded       Yonathan Burch at 3:58 PM on 12/19/2023

## 2023-12-19 NOTE — PROGRESS NOTES
S: Nadira Perez is a 73 year old female here to follow-up on lab results from visit of 11/30/23 and to re-evaluate a swelling on the anterior proximal lower leg.    She believes the fluid filled lesion has spread out and is not protruding as much as previously.  It is  when touched and without being touched.      She was advised by Dr. Lino to have her BMP repeated after restarting her spironolactone.    She has experiences RLQ discomfort this a.m. and is concerned it might be her amlodipine breaking down her intestinal wall and causing diverticulosis. Previously the pain was in the RLQ.     She is concerned about palpitations she is feeling.  Wondering if she could have her cardiology follow-up before March.     Has been experiencing reflux with bending over.  Has been able to decrease symptoms some with change in activities.     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    Corneal opacity    Hypothyroidism    Osteopenia, unspecified location    Aromatase inhibitor use    Chronic pain of right knee    DDD (degenerative disc disease), lumbar    Degenerative scoliosis in adult patient    Carpal tunnel syndrome of right wrist    Peripheral neuropathy    Spinal stenosis of lumbar region with neurogenic claudication    Spondylolisthesis of lumbar region    Brain lesion    Dermatochalasis of both upper eyelids    S/P spinal fusion    Chronic bilateral low back pain    PVD (posterior vitreous detachment), left eye     Vitreous opacities of left eye    Closed nondisplaced fracture of lateral malleolus of left fibula, initial encounter    Acute left ankle pain    Sacroiliitis (H24)    Lumbar radiculopathy    Lumbosacral radiculopathy    Diabetes mellitus, type 2 (H)       Past Surgical History:   Procedure Laterality Date    ABDOMEN SURGERY      ovarian cyst, mesh    ARTHRODESIS WRIST Right     ARTHRODESIS WRIST  02/14/2013    Procedure: ARTHRODESIS WRIST;  left wrist scaphoid excision, four bone fusion, iliac crest bone graft  ( Mac with block);  Surgeon: Av Mendez MD;  [facility]    BIOPSY      skin, vaginal    BLEPHAROPLASTY BILATERAL Bilateral 05/06/2022    Procedure: UPPER BLEPHAROPLASTY, BILATERAL;  Surgeon: Jemal Sanchez MD;  Location: [facility] [place] OR    CATARACT IOL, RT/LT Right 03/13/2018    CATARACT IOL, RT/LT Left 02/20/2018    COLONOSCOPY  12/24/2013    Procedure: COMBINED COLONOSCOPY, SINGLE BIOPSY/POLYPECTOMY BY BIOPSY;  COLONOSCOPY;  Surgeon: Dom Alvarez MD;  Location: [facility]    COSMETIC BLEPHAROPLASTY LOWER LIDS BILATERAL Bilateral 05/06/2022    Procedure: BLEPHAROPLASTY, LOWER EYELID, BILATERAL, COSMETIC;  Surgeon: Jemal Sanchez MD;  Location: [facility] [place] OR    COSMETIC SURGERY      ESOPHAGOSCOPY, GASTROSCOPY, DUODENOSCOPY (EGD), COMBINED N/A 11/23/2016    Procedure: COMBINED ESOPHAGOSCOPY, GASTROSCOPY, DUODENOSCOPY (EGD);  Surgeon: Quinten Feliciano MD;  Location:  GI    EXTERNAL EAR SURGERY      right    EYE SURGERY      radial keratomy    FUSION, SPINE, INTERBODY, OBLIQUE ANTERIOR AND LUMBAR, 2 LEVELS, POST APPROACH, USING OTS N/A 11/18/2021    Procedure: Part 1: Oblique Anterior Interbody Fusion at Lumbar 5 to sacral 1 with use of Bone Morphogenic Protein,;  Surgeon: Serge Ramirez MD;  Location:  OR    GI SURGERY      Umbilical hernia repair    GRAFT BONE FROM ILIAC CREST  02/14/2013    Procedure: GRAFT BONE FROM ILIAC CREST;  mac with block and local infilitration;  Surgeon:  Av Mendez MD;  [facility]     BREATH HYDROGEN TEST N/A 10/14/2016    Procedure: HYDROGEN BREATH TEST;  Surgeon: Cheri Barron MD;  Location: [facility] GI    HERNIA REPAIR      umbilical age 18 mos.    HYSTERECTOMY TOTAL ABDOMINAL  05/03/2000    INJECT EPIDURAL CAUDAL N/A 09/12/2023    Procedure: Caudal epidural steroid injection with fluoroscopy;  Surgeon: Natasha Umaña MD;  Location: [facility] OR    INJECT EPIDURAL LUMBAR N/A 05/23/2023    Procedure: L3-4 interlaminar epidural steroid injection with fluoroscopy ( left> right);  Surgeon: Natasha Umaña MD;  Location: [facility] OR    INJECT JOINT SACROILIAC Left 04/27/2023    Procedure: Left sacroiliac joint steroid injection with fluoroscopy;  Surgeon: Natasha Umaña MD;  Location: [facility] OR    MASTECTOMY MODIFIED RADICAL Bilateral     bilateral; right breast prophylactic    OPTICAL TRACKING SYSTEM FUSION POSTERIOR SPINE LUMBAR N/A 11/18/2021    Procedure: Open Posterior Instrumented Spinal Fusion at Lumbar 5 to Sacral 1, with grimm aguayo osteotomy, use of O-Arm/Stealth;  Surgeon: Serge Ramirez MD;  Location: [facility] OR    PARATHYROIDECTOMY  09/23/2004    R SUPERIOR    PARATHYROIDECTOMY  09/23/2004    parathyroid resection, subtotal    RELEASE CARPAL TUNNEL Right 12/02/2021    Procedure: RIGHT CARPAL TUNNEL RELEASE, RIGHT WRIST HARDWARE REMOVAL, RIGHT CARPAL BOSS EXCISION;  Surgeon: Rolan Conley MD;  [place] [facility] [place]    REMOVE HARDWARE HAND  09/24/2013    Procedure: REMOVE HARDWARE HAND;  Left Hand Screw Removal       RHINOPLASTY  1968    thyr proc skin closed cosmetic manner by subcuticular stitch  01/23/2009    THYROPLASTY  10/09/2009    TONSILLECTOMY  1977    VITRECTOMY PARSPLANA WITH 25 GAUGE SYSTEM Left 06/20/2022    Procedure: LEFT EYE VITRECTOMY, PARS PLANA APPROACH, USING 25-GAUGE INSTRUMENTS, laser;  Surgeon: Felix Carlos MD;  Location: [facility] [place]    WRIST SURGERY      wrist arthrodesis               Allergies    Allergen Reactions    Erythromycin Nausea    Penicillins Hives     Around age 6 - doesn't recall full reaction, mother told her it was hives.     Has tolerated cephalsporins.           Current Outpatient Medications   Medication Sig Dispense Refill    acetaminophen (TYLENOL) 500 MG tablet Take 500-1,000 mg by mouth every 6 hours as needed for mild pain      amLODIPine (NORVASC) 5 MG tablet Take 1 tablet (5 mg) by mouth daily 90 tablet 3    Ascorbic Acid (VITAMIN C) 500 MG CHEW Take 1 tablet by mouth daily      atorvastatin (LIPITOR) 80 MG tablet Take 1 tablet (80 mg) by mouth daily 90 tablet 3    biotin 1000 MCG TABS tablet Take 1,000 mcg by mouth daily      Cholecalciferol (VITAMIN D3) 50 MCG (2000 UT) CAPS TAKE 3 CAPSULES (6,000 UNITS) BY MOUTH DAILY. 270 capsule 3    CRANBERRY EXTRACT PO Take 650 mg by mouth daily ~10 am  Takes 1      diclofenac (VOLTAREN) 1 % topical gel Apply to toe or other joints painful 100 g 3    gabapentin (NEURONTIN) 300 MG capsule Take 4 capsules (1,200 mg) by mouth 3 times daily 1080 capsule 3    HEMP OIL OR EXTRACT OR OTHER CBD CANNABINOID, NOT MEDICAL CANNABIS, THC      HYDROcodone-acetaminophen (NORCO) 5-325 MG tablet Take 1 tab for breakthrough pain 1-2 times a week. 30 tablet 0    levothyroxine (SYNTHROID/LEVOTHROID) 112 MCG tablet TAKE 1/2 TABLET BY MOUTH EVERY DAY **Call clinic to schedule follow up LABS [phone] **NEEDED FOR FURTHER REFILLS** 45 tablet 0    lisinopril (ZESTRIL) 40 MG tablet Take 1 tablet (40 mg) by mouth daily 90 tablet 3    magnesium 250 MG tablet Take 1 tablet by mouth daily      melatonin 5 MG tablet Take 10 mg by mouth At Bedtime       methocarbamol (ROBAXIN) 500 MG tablet Take 1 tablet (500 mg) by mouth 3 times daily 40 tablet 4    metoprolol succinate ER (TOPROL XL) 50 MG 24 hr tablet Take 1 tablet (50 mg) by mouth daily 90 tablet 3    Multiple Vitamin (MULTI-VITAMIN) per tablet Take 1 tablet by mouth daily      naloxone (NARCAN) 4 MG/0.1ML nasal  spray Spray 1 spray (4 mg) into one nostril alternating nostrils once as needed for opioid reversal every 2-3 minutes until assistance arrives 0.2 mL 0    ondansetron (ZOFRAN-ODT) 4 MG ODT tab Take 1 tablet (4 mg) by mouth every 12 hours as needed for nausea 180 tablet 3    spironolactone (ALDACTONE) 25 MG tablet Take 2 tablets (50 mg) by mouth daily 180 tablet 3    tiZANidine (ZANAFLEX) 4 MG tablet TAKE 0.5-1 TABLET BY MOUTH 3 TIMES DAILY 60 tablet 0    Vaginal Lubricant (REPHRESH) GEL Place 2 g vaginally every 3 days 2 g 3       REVIEW OF SYSTEMS:  See above.    O:   /75 (BP Location: Right arm, Patient Position: Sitting, Cuff Size: Adult Regular)   Pulse 79   SpO2 97%   GENERAL APPEARANCE: alert and no distress  EYES: grossly normal.  RESP: no distress  CV: see vs  NEURO: alert and oriented.  Good historian.  MUSK: ambulatory.c She has a walker and a right leg splint.  SKIN: warm and dry.  Right proximal anterior lower leg: there is a slight soft tissue swelling palpable.measuring approx. 5 x 5 cm.  Area was cleaned with betadine.  A 21 gauge needle was inserted at the base but no fluid was able to be extracted.   EXT: warm.  Edema YES; amount - right lower leg 1 + pitting.   PSYCHE: normal    A/P:    Ismael was seen today for follow up.    Diagnoses and all orders for this visit:    Essential hypertension, benign  -     Basic metabolic panel  (Ca, Cl, CO2, Creat, Gluc, K, Na, BUN); Future    Palpitations  -     we examined the Zeo patch results which reported very short runs of SVT and VT.  After examining the report she feels more comfortable in that her cardiology appt. Is not until March.      The patient voiced understanding of the information discussed and all questions were answered.     I spent a total of  38  minutes in the care of this pt during today's office visit. This time includes reviewing the patient's chart and prior history, obtaining a history, performing an examination and evaluation  and counseling the patient. This time also includes ordering medications or tests necessary in addition to communication to other member's of the patient's health care team. Time spent in documentation and care coordination is included.     Matilde LONG, CNP

## 2023-12-22 ENCOUNTER — HOSPITAL ENCOUNTER (OUTPATIENT)
Dept: CARDIOLOGY | Facility: CLINIC | Age: 73
Discharge: HOME | End: 2023-12-22
Attending: INTERNAL MEDICINE | Admitting: INTERNAL MEDICINE
Payer: COMMERCIAL

## 2023-12-22 DIAGNOSIS — I10 ESSENTIAL HYPERTENSION, BENIGN: ICD-10-CM

## 2023-12-22 DIAGNOSIS — Z91.89 AT RISK FOR CARDIOMYOPATHY: ICD-10-CM

## 2023-12-22 LAB — LVEF ECHO: NORMAL

## 2023-12-22 PROCEDURE — 93306 TTE W/DOPPLER COMPLETE: CPT

## 2023-12-22 PROCEDURE — 93306 TTE W/DOPPLER COMPLETE: CPT | Mod: 26 | Performed by: INTERNAL MEDICINE

## 2024-01-01 ENCOUNTER — ANESTHESIA EVENT (OUTPATIENT)
Dept: SURGERY | Facility: AMBULATORY SURGERY CENTER | Age: 74
End: 2024-01-01
Payer: COMMERCIAL

## 2024-01-02 ENCOUNTER — ANCILLARY PROCEDURE (OUTPATIENT)
Dept: RADIOLOGY | Facility: AMBULATORY SURGERY CENTER | Age: 74
End: 2024-01-02
Attending: ANESTHESIOLOGY
Payer: COMMERCIAL

## 2024-01-02 ENCOUNTER — ANESTHESIA (OUTPATIENT)
Dept: SURGERY | Facility: AMBULATORY SURGERY CENTER | Age: 74
End: 2024-01-02
Payer: COMMERCIAL

## 2024-01-02 ENCOUNTER — HOSPITAL ENCOUNTER (OUTPATIENT)
Facility: AMBULATORY SURGERY CENTER | Age: 74
Discharge: HOME | End: 2024-01-02
Attending: ANESTHESIOLOGY | Admitting: ANESTHESIOLOGY
Payer: COMMERCIAL

## 2024-01-02 VITALS
DIASTOLIC BLOOD PRESSURE: 77 MMHG | SYSTOLIC BLOOD PRESSURE: 122 MMHG | RESPIRATION RATE: 16 BRPM | OXYGEN SATURATION: 99 %

## 2024-01-02 DIAGNOSIS — M54.17 LUMBOSACRAL RADICULOPATHY: ICD-10-CM

## 2024-01-02 PROCEDURE — 99207 PR NO CHARGE LOS: CPT | Performed by: ANESTHESIOLOGY

## 2024-01-02 PROCEDURE — 62323 NJX INTERLAMINAR LMBR/SAC: CPT

## 2024-01-02 RX ORDER — IOPAMIDOL 408 MG/ML
INJECTION, SOLUTION INTRATHECAL DAILY PRN
Status: DISCONTINUED | OUTPATIENT
Start: 2024-01-02 | End: 2024-01-02 | Stop reason: HOSPADM

## 2024-01-02 RX ORDER — LIDOCAINE HYDROCHLORIDE 10 MG/ML
INJECTION, SOLUTION EPIDURAL; INFILTRATION; INTRACAUDAL; PERINEURAL DAILY PRN
Status: DISCONTINUED | OUTPATIENT
Start: 2024-01-02 | End: 2024-01-02 | Stop reason: HOSPADM

## 2024-01-02 RX ORDER — BUPIVACAINE HYDROCHLORIDE 2.5 MG/ML
INJECTION, SOLUTION INFILTRATION; PERINEURAL DAILY PRN
Status: DISCONTINUED | OUTPATIENT
Start: 2024-01-02 | End: 2024-01-02 | Stop reason: HOSPADM

## 2024-01-02 RX ORDER — METHYLPREDNISOLONE ACETATE 40 MG/ML
INJECTION, SUSPENSION INTRA-ARTICULAR; INTRALESIONAL; INTRAMUSCULAR; SOFT TISSUE DAILY PRN
Status: DISCONTINUED | OUTPATIENT
Start: 2024-01-02 | End: 2024-01-02 | Stop reason: HOSPADM

## 2024-01-02 NOTE — OP NOTE
Caudal Epidural Steroid Injection    Procedure:  1. Caudal epidural steroid injection  2. Fluoroscopy for needle guidance    Pre-Operative Diagnosis:  1. Lumbosacral radiculopathyclassified  2. Low back pain    Post-Operative Diagnosis:  1. Lumbosacral radiculopathy  2. Low back pain    Anesthesia:  Local anesthesia    Medical Indications:  The patient presents to the clinic today for the treatment of persistent pain. We believe that the pain is spinally mediated. The patient has been exhibiting symptoms consistent with intraspinal inflammation and radiculopathy. Symptoms have been persistent, disabling and intermittently severe. The patient has been evaluated in the pain clinic and scheduled today for a spinal injection to aid in the diagnosis and treatment of presumed spinal pain. The risks, benefits, potential complications, and alternatives were discussed with the patient as per the signed consent form, and informed consent was obtained. The patient has received information about the procedure and its risks. The physician personally confirmed the procedure, side, and site to be injected with the patient and marked the site in the pre-procedure area.    Description of Procedure:  An additional intraoperative timeout, specifically to confirm accurate localization, was conducted by the attending physician. The patient remained awake and alert throughout the procedure. The patient was placed in the prone position. The skin was prepped with chlorhexidine and sterile drapes were applied. The skin and soft tissues were anesthetized with Lidocaine 1%. A 22 gauge 3.5 inch quincke spinal needle was inserted percutaneously and advanced under fluoroscopic guidance using AP, and lateral projections into the caudal epidural space. No paraesthesias occurred and aspiration was negative. Epidurography and radiographic interpretation: Epidurography was performed by injection of Isovue 200 under live fluoroscopy. Multiple Xray  images were obtained. Radiologic examination confirmed proper spread of the contrast medium within the epidural space. There was no evidence of intrathecal or intravascular runoff. The needle was injected with 40 mg methylprednisolone mixed with 4 ml 0.25% bupivacaine. The needle was removed after flushing with 1% lidocaine and sterile bandage was applied. The patient did not experience any hemodynamic or neurologic sequelae.\    Complications:  No procedural or immediate post-procedural complications occurred and the patient was transferred to PACU in stable condition.    Procedure Images:  Saved to the chart.    Post-Operative Plan:  The patient will monitor pain relief from today's procedure. They will call the clinic for any problems related to today's procedure. A copy of our written post-procedure instructions was provided. They will return to clinic as scheduled.    Plan Details:  Follow up in the clinic in 3-4 weeks.

## 2024-01-02 NOTE — H&P
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    Corneal opacity    Hypothyroidism    Osteopenia, unspecified location    Aromatase inhibitor use    Chronic pain of right knee    DDD (degenerative disc disease), lumbar    Degenerative scoliosis in adult patient    Carpal tunnel syndrome of right wrist    Peripheral neuropathy    Spinal stenosis of lumbar region with neurogenic claudication    Spondylolisthesis of lumbar region    Brain lesion    Dermatochalasis of both upper eyelids    S/P spinal fusion    Chronic bilateral low back pain    PVD (posterior vitreous detachment), left eye    Vitreous opacities of left eye    Closed nondisplaced fracture of lateral malleolus of left fibula, initial encounter    Acute left ankle pain    Sacroiliitis (H24)    Lumbar radiculopathy    Lumbosacral radiculopathy    Diabetes mellitus, type 2 (H)       Past Surgical History:    Past Surgical History:   Procedure Laterality Date    ABDOMEN SURGERY      ovarian cyst, mesh    ARTHRODESIS WRIST Right     ARTHRODESIS WRIST  02/14/2013    Procedure: ARTHRODESIS WRIST;  left wrist scaphoid excision, four bone fusion, iliac crest bone graft  ( Mac with block);  Surgeon: Av Mendez MD;  [facility]    BIOPSY      skin, vaginal    BLEPHAROPLASTY BILATERAL Bilateral 05/06/2022    Procedure: UPPER BLEPHAROPLASTY, BILATERAL;   PT ACUTE  Treatment Session          Pt seen on [facility] nursing unit.                                                Frequency Comments: X seen 3/26 M-F                                                                                                               Admitting complaint: Hyperkalemia [E87.5]  SOB (shortness of breath) [R06.02]  Generalized weakness [R53.1]  Acute urinary retention [R33.8]                                     Precautions  Other Precautions: fall risk (03/23/17 0900)    SUBJECTIVE:    Subjective: Pt resting sitting EOB getting washed up with NA at start of session. \"I can walk in the wallis if you want.\" (03/26/17 1100)  Subjective/Objective Comments: RN Kalie aware of session and pts mobility. Pt resting in chair at end of session with all needs met and in reach. (03/26/17 1100)    OBJECTIVE:  Basic Lines: Capped IV;Enrique catheter (03/26/17 1100)  Safety Measures: Other (comment) (call light in reach) (03/26/17 1100)    RN reported Sanchez Fall Scale Score: 85    ASSESSMENT:   Pt resting sitting EOB with NA at beginning of session and in chair with lunch tray present at end of session. RN aware of session and pts mobility. Pt performed transfers with supervision for safety, verbal cues for safe hand placement required as pt prefers to keep hands on 2ww to stand and sit. Pt performed ambulation with touching assist for safety and balance x 250ft. Pt displayed heavy reliance on 2ww for UE support. One seated rest break required after 80ft due to weakness in RLE and fatigue. Pt educated on pacing with all mobility and use of walker for support. Educated pt on calling for staff to assist with mobility and encouraging increased time spent OOB and up to recliner during the day. Pt displayed increased tolerance and motivation this session. Pt will benefit from continued therapy to address deficits and to increase functional mobility.      Other Therapeutic Intervention: Additional time spent educating pt  Surgeon: Jemal Sanchez MD;  [place] [facility] [place]    CATARACT IOL, RT/LT Right 03/13/2018    CATARACT IOL, RT/LT Left 02/20/2018    COLONOSCOPY  12/24/2013    Procedure: COMBINED COLONOSCOPY, SINGLE BIOPSY/POLYPECTOMY BY BIOPSY;  COLONOSCOPY;  Surgeon: Dom Alvarez MD;  Location: [facility]    COSMETIC BLEPHAROPLASTY LOWER LIDS BILATERAL Bilateral 05/06/2022    Procedure: BLEPHAROPLASTY, LOWER EYELID, BILATERAL, COSMETIC;  Surgeon: Jemal Sanchez MD;  [place] [facility] [place]    COSMETIC SURGERY      ESOPHAGOSCOPY, GASTROSCOPY, DUODENOSCOPY (EGD), COMBINED N/A 11/23/2016    Procedure: COMBINED ESOPHAGOSCOPY, GASTROSCOPY, DUODENOSCOPY (EGD);  Surgeon: Quinten Feliciano MD;  Location:  GI    EXTERNAL EAR SURGERY      right    EYE SURGERY      radial keratomy    FUSION, SPINE, INTERBODY, OBLIQUE ANTERIOR AND LUMBAR, 2 LEVELS, POST APPROACH, USING OTS N/A 11/18/2021    Procedure: Part 1: Oblique Anterior Interbody Fusion at Lumbar 5 to sacral 1 with use of Bone Morphogenic Protein,;  Surgeon: Serge Ramirez MD;  Location:  OR    GI SURGERY      Umbilical hernia repair    GRAFT BONE FROM ILIAC CREST  02/14/2013    Procedure: GRAFT BONE FROM ILIAC CREST;  mac with block and local infilitration;  Surgeon: Av Mendez MD;  Location:  OR     BREATH HYDROGEN TEST N/A 10/14/2016    Procedure: HYDROGEN BREATH TEST;  Surgeon: Cheri Barron MD;  Location:  GI    HERNIA REPAIR      umbilical age 18 mos.    HYSTERECTOMY TOTAL ABDOMINAL  05/03/2000    INJECT EPIDURAL CAUDAL N/A 09/12/2023    Procedure: Caudal epidural steroid injection with fluoroscopy;  Surgeon: Natasha Umaña MD;  Location: [facility] OR    INJECT EPIDURAL LUMBAR N/A 05/23/2023    Procedure: L3-4 interlaminar epidural steroid injection with fluoroscopy ( left> right);  Surgeon: Natasha Umaña MD;  Location: [facility] OR    INJECT JOINT SACROILIAC Left 04/27/2023    Procedure: Left sacroiliac joint steroid injection with fluoroscopy;  on benefit of home PT/additional therapy post acute care stay. Educated on calling for staff for assist with mobility for safety.  (03/26/17 1100)       PT Identified Barriers to Discharge: medical, mobility     PLAN:   Continue skilled PT, including the following Treatment/Interventions: Functional transfer training;Strengthening;Endurance training;Bed mobility;Gait training;Safety Education (03/26/17 1100)   Frequency Comments: X seen 3/26 M-F (03/26/17 1100)    Treatment Plan for Next Session: transfers, increase ambulation distance, LE strengthening  Additional Plan Considerations: collaborated with MOHAMUD to get bariatric WW and bariatric recliner for pt       RECOMMENDATIONS FOR DISCHARGE:  Recommendation for Discharge: PT: Home;Home therapy;Post acute therapy (pending progress) (03/26/17 1100)    PT/OT Mobility Equipment for Discharge: pt has 4WW at home, no further equipment anticipated but will continue to monitor through dc (03/26/17 1100)  PT/OT ADL Equipment for Discharge: Likely no needs, patient has shower chair at home (03/26/17 1100)    ICU Mobility Assesment (PERME):       Last 24 hours of Functional Data  Bed Mobility   Bed Mobility  Bed Mobility Comments: not observed this session (03/26/17 1100)    Transfers  Transfers  Sit to Stand: Supervision (Supv) (03/26/17 1100)  Stand to Sit: Supervision (Supv) (03/26/17 1100)  Assistive Device/: 2-wheeled walker;1 Person;Gait Belt (03/26/17 1100)  Transfer Comments 1: Supervision for safety, cues for safe hand placement but pt continues to use walker for support vs bed/chair arms. Educated on safety (03/26/17 1100)      Gait  Gait  Gait Assistance: Touching/Steadying Assistance (03/26/17 1100)  Assistive Device/: 2-wheeled walker;1 Person;Gait Belt (03/26/17 1100)  Ambulation Distance (Feet): 250 Feet (03/26/17 1100)  Pattern: Shuffle (03/26/17 1100)  Ambulation Surface: Tile (03/26/17 1100)  Gait Comments 1: Pt ambulated 250ft this   Surgeon: Natasha Umaña MD;  Location: [facility] OR    MASTECTOMY MODIFIED RADICAL Bilateral     bilateral; right breast prophylactic    OPTICAL TRACKING SYSTEM FUSION POSTERIOR SPINE LUMBAR N/A 11/18/2021    Procedure: Open Posterior Instrumented Spinal Fusion at Lumbar 5 to Sacral 1, with grimm aguayo osteotomy, use of O-Arm/Stealth;  Surgeon: Serge Ramirez MD;  Location: [facility]    PARATHYROIDECTOMY  09/23/2004    R SUPERIOR    PARATHYROIDECTOMY  09/23/2004    parathyroid resection, subtotal    RELEASE CARPAL TUNNEL Right 12/02/2021    Procedure: RIGHT CARPAL TUNNEL RELEASE, RIGHT WRIST HARDWARE REMOVAL, RIGHT CARPAL BOSS EXCISION;  Surgeon: Rolan Conley MD;  [place] [facility] [place]    REMOVE HARDWARE HAND  09/24/2013    Procedure: REMOVE HARDWARE HAND;  Left Hand Screw Removal       RHINOPLASTY  1968    thyr proc skin closed cosmetic manner by subcuticular stitch  01/23/2009    THYROPLASTY  10/09/2009    TONSILLECTOMY  1977    VITRECTOMY PARSPLANA WITH 25 GAUGE SYSTEM Left 06/20/2022    Procedure: LEFT EYE VITRECTOMY, PARS PLANA APPROACH, USING 25-GAUGE INSTRUMENTS, laser;  Surgeon: Felix Carlos MD;  [place] [facility] [place]    WRIST SURGERY      wrist arthrodesis       Past Medical History:   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 One seated rest break requried after 80ft of ambulation due to fatigue. No gross LOB or buckling noted this session.  (03/26/17 1100)  Gait Comments 2: Pt relies heavily on 2ww for UE support due to LE weakness (03/26/17 1100),      Stairs          Neuromuscular Re-education       Balance  Balance  Sitting - Static: Independent (03/26/17 1100)  Sitting - Dynamic: Independent (03/26/17 1100)  Standing - Static: Supervision (Supv) (03/26/17 1100)  Standing - Dynamic (eyes open): Supervision (Supv) (03/26/17 1100)  Balance Comments #1: no gross LOB or buckling noted, use of 2ww once standing for UE support and balance.  (03/26/17 1100)    Wheelchair Mobility       Patient's Personal Goal: to return to apt (03/23/17 0900)    Therapy Goals:    Goals  Short Term Goals to Be Reviewed On: 04/03/17 (03/23/17 0900)  Short Term Goals = Discharge Goals: Yes (03/23/17 0900)  Goal Agreement: Patient agrees with goals and treatment plan (03/23/17 0900)  Bed Mobility Discharge Goal: I sup<>sit HOB flat, no rail (03/23/17 0900)  Transfer Discharge Goal: mod I with WW to all surfaces (03/23/17 0900)  Ambulation Discharge Goal: mod I 150' with WW (03/23/17 0900)  Stairs Discharge Goal: NA (03/23/17 0900)        PT Time Spent: 62 minutes (03/26/17 1100)    See PT flowsheet for full details regarding the PT therapy provided.       Currently     Alcohol/week: 7.0 standard drinks of alcohol     Types: 7 Glasses of wine per week     Comment: Rare if ever       Family History:   Family History   Problem Relation Age of Onset    Neurologic Disorder Mother         Anuerysm of Cerebral Artery, Dementia    Diabetes Mother     Thyroid Disease Mother         Hyperthyroidism (goiter)    Cerebrovascular Disease Mother         Hemorrhagic    Dementia Mother     Osteoporosis Mother     Hyperlipidemia Mother     Heart Disease Father         AAA    Hypertension Father     Coronary Artery Disease Father     Hyperlipidemia Father     Mental Illness Father         Schizophrenia?    Dementia Brother         hydrocephalis    Circulatory Brother         Perihperal Neurophathy    Diabetes Maternal Grandmother         Presumed Type 2    Asthma Maternal Grandmother     Chronic Obstructive Pulmonary Disease Maternal Grandfather         father    Asthma Maternal Grandfather     Diabetes Maternal Aunt         x2    Melanoma Maternal Aunt     Glaucoma Maternal Aunt     Breast Cancer Cousin     Breast Cancer Cousin     Dementia Other     Cancer Other         malignant melanoma    Hypertension Other     Hypertension Other     Cerebrovascular Disease Other     Cerebrovascular Disease Other     Obesity Other     Respiratory Other     Cancer Other     Diabetes Other     Asthma Other     Other Cancer Other         Malignant melanoma    Obesity Other     Macular Degeneration No family hx of     Kidney Disease No family hx of     Thrombosis No family hx of     Arthritis No family hx of     Depression No family hx of     Substance Abuse No family hx of     Cystic Fibrosis No family hx of     Early Death No family hx of     Coronary Artery Disease Early Onset No family hx of     Heart Failure No family hx of     Bleeding Diathesis No family hx of     Ovarian Cancer No family hx of     Uterine Cancer No family hx of     Prostate Cancer No family hx of     Colorectal Cancer No  family hx of     Pancreatic Cancer No family hx of     Lung Cancer No family hx of     Autoimmune Disease No family hx of     Unknown/Adopted No family hx of     Genetic Disorder No family hx of     Bleeding Disorder No family hx of     Clotting Disorder No family hx of     Anesthesia Reaction No family hx of        Allergies:   Allergies   Allergen Reactions    Erythromycin Nausea    Penicillins Hives     Around age 6 - doesn't recall full reaction, mother told her it was hives.     Has tolerated cephalsporins.        Active Medications:   Current Outpatient Medications   Medication Sig Dispense Refill    acetaminophen (TYLENOL) 500 MG tablet Take 500-1,000 mg by mouth every 6 hours as needed for mild pain      amLODIPine (NORVASC) 5 MG tablet Take 1 tablet (5 mg) by mouth daily 90 tablet 3    Ascorbic Acid (VITAMIN C) 500 MG CHEW Take 1 tablet by mouth daily      atorvastatin (LIPITOR) 80 MG tablet Take 1 tablet (80 mg) by mouth daily 90 tablet 3    biotin 1000 MCG TABS tablet Take 1,000 mcg by mouth daily      Cholecalciferol (VITAMIN D3) 50 MCG (2000 UT) CAPS TAKE 3 CAPSULES (6,000 UNITS) BY MOUTH DAILY. 270 capsule 3    CRANBERRY EXTRACT PO Take 650 mg by mouth daily ~10 am  Takes 1      gabapentin (NEURONTIN) 300 MG capsule Take 4 capsules (1,200 mg) by mouth 3 times daily 1080 capsule 3    HYDROcodone-acetaminophen (NORCO) 5-325 MG tablet Take 1 tab for breakthrough pain 1-2 times a week. 30 tablet 0    levothyroxine (SYNTHROID/LEVOTHROID) 112 MCG tablet TAKE 1/2 TABLET BY MOUTH EVERY DAY **Call clinic to schedule follow up LABS [phone] **NEEDED FOR FURTHER REFILLS** 45 tablet 0    lisinopril (ZESTRIL) 40 MG tablet Take 1 tablet (40 mg) by mouth daily 90 tablet 3    magnesium 250 MG tablet Take 1 tablet by mouth daily      melatonin 5 MG tablet Take 10 mg by mouth At Bedtime       methocarbamol (ROBAXIN) 500 MG tablet Take 1 tablet (500 mg) by mouth 3 times daily 40 tablet 4    metoprolol succinate ER  (TOPROL XL) 50 MG 24 hr tablet Take 1 tablet (50 mg) by mouth daily 90 tablet 3    Multiple Vitamin (MULTI-VITAMIN) per tablet Take 1 tablet by mouth daily      ondansetron (ZOFRAN-ODT) 4 MG ODT tab Take 1 tablet (4 mg) by mouth every 12 hours as needed for nausea 180 tablet 3    spironolactone (ALDACTONE) 25 MG tablet Take 2 tablets (50 mg) by mouth daily 180 tablet 3    tiZANidine (ZANAFLEX) 4 MG tablet TAKE 0.5-1 TABLET BY MOUTH 3 TIMES DAILY 60 tablet 0    diclofenac (VOLTAREN) 1 % topical gel Apply to toe or other joints painful 100 g 3    HEMP OIL OR EXTRACT OR OTHER CBD CANNABINOID, NOT MEDICAL CANNABIS, THC      naloxone (NARCAN) 4 MG/0.1ML nasal spray Spray 1 spray (4 mg) into one nostril alternating nostrils once as needed for opioid reversal every 2-3 minutes until assistance arrives 0.2 mL 0    Vaginal Lubricant (REPHRESH) GEL Place 2 g vaginally every 3 days 2 g 3       Systemic Review:   CONSTITUTIONAL: NEGATIVE for fever, chills, change in weight  ENT/MOUTH: NEGATIVE for ear, mouth and throat problems  RESP: NEGATIVE for significant cough or SOB  CV: NEGATIVE for chest pain, palpitations or peripheral edema    Physical Examination:   Vital Signs: /77   Resp 16   SpO2 99%   GENERAL: healthy, alert and no distress  NECK: no adenopathy, no asymmetry, masses, or scars  RESP: lungs clear to auscultation - no rales, rhonchi or wheezes  CV: regular rate and rhythm, normal S1 S2, no S3 or S4, no murmur, click or rub, no peripheral edema and peripheral pulses strong  ABDOMEN: soft, nontender, no hepatosplenomegaly, no masses and bowel sounds normal  MS: no gross musculoskeletal defects noted, no edema    Plan: Appropriate to proceed as scheduled.      Apolonia Scanlon MD  1/2/2024

## 2024-01-02 NOTE — DISCHARGE INSTRUCTIONS
Caudal Epidural Pain Injection  This procedure can be used to treat a variety of conditions that result in lower back and lower extremity pain.    Activity  -You may resume most normal activity levels with the exception of strenuous activity. It is important for us to know if your pain with normal activity is relieved after this injection.  -DO NOT shower for 24 hours  -DO NOT remove bandaid for 24 hours    Pain  -You may experience soreness at the injection site for one or two days  -You may use an ice pack for 20 minutes every 2 hours for the first 24 hours  -You may use a heating pad after the first 24 hours  -You may use Tylenol (acetaminophen) every 4 hours or other pain medicines as     directed by your physician    You may experience numbness radiating into your legs or arms (depending on the procedure location). This numbness may last several hours. Until sensation returns to normal; please use caution in walking, climbing stairs, and stepping out of your vehicle, etc.    DID YOU RECEIVE STEROIDS TODAY?  Yes    Common side effects of steroids:  Not everyone will experience corticosteroid side effects. If side effects are experienced, they will gradually subside in the 7-10 day period following an injection. Most common side effects include:  -Flushed face and/or chest  -Feeling of warmth, particularly in the face but could be an overall feeling of warmth  -Increased blood sugar in diabetic patients  -Menstrual irregularities my occur. If taking hormone-based birth control an alternate method of birth control is recommended  -Sleep disturbances and/or mood swings are possible  -Leg cramps      PLEASE KEEP TRACK OF YOUR SYMPTOMS AND NOTE YOUR IMPROVEMENT FOR YOUR DOCTOR.     Please contact us if you have:  -Severe pain  -Fever more than 101.5 degrees Fahrenheit  -Signs of infection at the injection site (redness, swelling, or drainage)    FOR PAIN CENTER PATIENTS:  If you have questions, please contact the  Pain Clinic at [phone] Option #1 between the hours of 7:00 am and 3:00 pm Monday through Friday. After office hours you can contact the on call provider by dialing [phone]. If you need immediate attention, we recommend that you go to a hospital emergency room or dial [phone].

## 2024-01-14 DIAGNOSIS — E03.9 HYPOTHYROIDISM, UNSPECIFIED TYPE: ICD-10-CM

## 2024-01-18 RX ORDER — LEVOTHYROXINE SODIUM 112 UG/1
TABLET ORAL
Qty: 45 TABLET | Refills: 3 | Status: SHIPPED | OUTPATIENT
Start: 2024-01-18

## 2024-01-30 ENCOUNTER — THERAPY VISIT (OUTPATIENT)
Dept: PHYSICAL THERAPY | Facility: CLINIC | Age: 74
End: 2024-01-30
Payer: COMMERCIAL

## 2024-01-30 DIAGNOSIS — L90.5 SCAR CONDITION AND FIBROSIS OF SKIN: ICD-10-CM

## 2024-01-30 DIAGNOSIS — I89.0 LYMPHEDEMA OF LEFT UPPER EXTREMITY: Primary | ICD-10-CM

## 2024-01-30 DIAGNOSIS — I89.0 LYMPHEDEMA OF BOTH LOWER EXTREMITIES: ICD-10-CM

## 2024-01-30 DIAGNOSIS — R26.89 IMPAIRMENT OF BALANCE: ICD-10-CM

## 2024-01-30 DIAGNOSIS — I89.0 LYMPHEDEMA: ICD-10-CM

## 2024-01-30 DIAGNOSIS — R53.81 PHYSICAL DECONDITIONING: ICD-10-CM

## 2024-01-30 DIAGNOSIS — R53.1 DECREASED STRENGTH: ICD-10-CM

## 2024-01-30 PROCEDURE — 97140 MANUAL THERAPY 1/> REGIONS: CPT | Mod: GP | Performed by: PHYSICAL THERAPIST

## 2024-01-30 PROCEDURE — 97110 THERAPEUTIC EXERCISES: CPT | Mod: GP | Performed by: PHYSICAL THERAPIST

## 2024-02-07 NOTE — TELEPHONE ENCOUNTER
FUTURE VISIT INFORMATION      FUTURE VISIT INFORMATION:  Date: 5/22/24  Time: 1:30pm  [place] [facility] [place]  REFERRAL INFORMATION:  Referring provider:   Matilde LONG CNP   Referring providers clinic:  [facility]  Reason for visit/diagnosis  Congenital abnormality of ear     RECORDS REQUESTED FROM:       Clinic name Comments Records Status Imaging Status   [facility]  Ov/referral 12/19/23 epic

## 2024-02-15 ENCOUNTER — ONCOLOGY VISIT (OUTPATIENT)
Dept: ONCOLOGY | Facility: CLINIC | Age: 74
End: 2024-02-15
Attending: STUDENT IN AN ORGANIZED HEALTH CARE EDUCATION/TRAINING PROGRAM
Payer: COMMERCIAL

## 2024-02-15 ENCOUNTER — ANCILLARY PROCEDURE (OUTPATIENT)
Dept: GENERAL RADIOLOGY | Facility: CLINIC | Age: 74
End: 2024-02-15
Attending: STUDENT IN AN ORGANIZED HEALTH CARE EDUCATION/TRAINING PROGRAM
Payer: COMMERCIAL

## 2024-02-15 VITALS
HEIGHT: 66 IN | SYSTOLIC BLOOD PRESSURE: 174 MMHG | WEIGHT: 188.5 LBS | HEART RATE: 84 BPM | DIASTOLIC BLOOD PRESSURE: 95 MMHG | OXYGEN SATURATION: 96 % | BODY MASS INDEX: 30.29 KG/M2 | RESPIRATION RATE: 18 BRPM | TEMPERATURE: 99 F

## 2024-02-15 DIAGNOSIS — Z17.0 MALIGNANT NEOPLASM OF BREAST IN FEMALE, ESTROGEN RECEPTOR POSITIVE, UNSPECIFIED LATERALITY, UNSPECIFIED SITE OF BREAST (H): Primary | ICD-10-CM

## 2024-02-15 DIAGNOSIS — R07.81 RIB PAIN: ICD-10-CM

## 2024-02-15 DIAGNOSIS — C50.919 MALIGNANT NEOPLASM OF BREAST IN FEMALE, ESTROGEN RECEPTOR POSITIVE, UNSPECIFIED LATERALITY, UNSPECIFIED SITE OF BREAST (H): ICD-10-CM

## 2024-02-15 DIAGNOSIS — C50.919 MALIGNANT NEOPLASM OF BREAST IN FEMALE, ESTROGEN RECEPTOR POSITIVE, UNSPECIFIED LATERALITY, UNSPECIFIED SITE OF BREAST (H): Primary | ICD-10-CM

## 2024-02-15 DIAGNOSIS — Z17.0 MALIGNANT NEOPLASM OF BREAST IN FEMALE, ESTROGEN RECEPTOR POSITIVE, UNSPECIFIED LATERALITY, UNSPECIFIED SITE OF BREAST (H): ICD-10-CM

## 2024-02-15 DIAGNOSIS — R07.89 STERNAL PAIN: ICD-10-CM

## 2024-02-15 PROCEDURE — 71046 X-RAY EXAM CHEST 2 VIEWS: CPT | Mod: GC | Performed by: RADIOLOGY

## 2024-02-15 PROCEDURE — G0463 HOSPITAL OUTPT CLINIC VISIT: HCPCS | Performed by: STUDENT IN AN ORGANIZED HEALTH CARE EDUCATION/TRAINING PROGRAM

## 2024-02-15 PROCEDURE — 99215 OFFICE O/P EST HI 40 MIN: CPT | Mod: GC | Performed by: STUDENT IN AN ORGANIZED HEALTH CARE EDUCATION/TRAINING PROGRAM

## 2024-02-15 ASSESSMENT — PAIN SCALES - GENERAL: PAINLEVEL: EXTREME PAIN (8)

## 2024-02-15 NOTE — NURSING NOTE
"Oncology Rooming Note    February 15, 2024 11:45 AM   Nadira Perez is a 73 year old female who presents for:    Chief Complaint   Patient presents with    Oncology Clinic Visit     [facility] RETURN - NEUROPATHY / POST BREAST CANCER      Initial Vitals: BP (!) 174/95 (BP Location: Right arm, Patient Position: Chair, Cuff Size: Adult Regular)   Pulse 84   Temp 99  F (37.2  C) (Oral)   Resp 18   Ht 1.676 m (5' 5.98\")   Wt 85.5 kg (188 lb 8 oz)   SpO2 96%   BMI 30.44 kg/m   Estimated body mass index is 30.44 kg/m  as calculated from the following:    Height as of this encounter: 1.676 m (5' 5.98\").    Weight as of this encounter: 85.5 kg (188 lb 8 oz). Body surface area is 2 meters squared.  Extreme Pain (8) Comment: Data Unavailable   No LMP recorded. Patient has had a hysterectomy.  Allergies reviewed: Yes  Medications reviewed: Yes    Medications: Medication refills not needed today.  Pharmacy name entered into [facility]:    [facility]  [facility] [address] [place] [address]  HARMONY DARNELL    Frailty Screening:   Is the patient here for a new oncology consult visit in cancer care? 2. No      Clinical concerns: Fell on chest yesterday 8/10 pain.     Kumar Sunshine LPN              "

## 2024-02-15 NOTE — PROGRESS NOTES
[facility]   PM&R clinic note        Interval history:     Nadira Perez presents to clinic today for follow up reg his/her rehab needs.   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  Continue using right AFO.  Continue using walker for all transfers and ambulation.  Referral / follow up with other providers:  Continue to follow-up with pain management team.  Referral placed to dermatology for evaluation of right lower extremity lipoma  Follow up: 6 to 8 months    Medical issues since last visit,  -She is following with pain for back pain. She had a second epidural injection on 01/02.  -She also shares she was seen by dermatology for the mass on her right shin, the ultrasound showed a fluid collection. Patient reports Dr. Quiñonez tried to remove the fluid without the success. She reports the mass hurts, she is planning to ask Dr. Quiñonez to be referred to surgery.    Symptoms,  Ismael reports last Monday, while walking in her house without the AFO, lost balance due to her right foot drop. She hit herself in the sternum and left knee. Since then she has had intermittent pain in the lower third of her sternum, elicited by any change of position and deep breaths, she has been taking ibuprofen twice a day and vicodin to relieve the pan, she feels the pain is not improving and it makes it very hard for her to do her usual activities.    She also reports her AFO fits well, uses it all day. Mobility wise she reports she is stable, except for the past 4 days after he fell down at home.     Other problems were not addresses as patient was in a lot of pain in her sternum and ribs due to the fall. She was accompanied to have xrays done.    Social history is unchanged.    Medications:  Current Outpatient Medications    Medication Sig Dispense Refill    acetaminophen (TYLENOL) 500 MG tablet Take 500-1,000 mg by mouth every 6 hours as needed for mild pain      amLODIPine (NORVASC) 5 MG tablet Take 1 tablet (5 mg) by mouth daily 90 tablet 3    Ascorbic Acid (VITAMIN C) 500 MG CHEW Take 1 tablet by mouth daily      atorvastatin (LIPITOR) 80 MG tablet Take 1 tablet (80 mg) by mouth daily 90 tablet 3    biotin 1000 MCG TABS tablet Take 1,000 mcg by mouth daily      Cholecalciferol (VITAMIN D3) 50 MCG (2000 UT) CAPS TAKE 3 CAPSULES (6,000 UNITS) BY MOUTH DAILY. 270 capsule 3    CRANBERRY EXTRACT PO Take 650 mg by mouth daily ~10 am  Takes 1      diclofenac (VOLTAREN) 1 % topical gel Apply to toe or other joints painful 100 g 3    gabapentin (NEURONTIN) 300 MG capsule Take 4 capsules (1,200 mg) by mouth 3 times daily 1080 capsule 3    HEMP OIL OR EXTRACT OR OTHER CBD CANNABINOID, NOT MEDICAL CANNABIS, THC      HYDROcodone-acetaminophen (NORCO) 5-325 MG tablet Take 1 tab for breakthrough pain 1-2 times a week. 30 tablet 0    levothyroxine (SYNTHROID/LEVOTHROID) 112 MCG tablet TAKE 1/2 TAB BY MOUTH EVERY DAY 45 tablet 3    lisinopril (ZESTRIL) 40 MG tablet Take 1 tablet (40 mg) by mouth daily 90 tablet 3    magnesium 250 MG tablet Take 1 tablet by mouth daily      melatonin 5 MG tablet Take 10 mg by mouth At Bedtime       methocarbamol (ROBAXIN) 500 MG tablet Take 1 tablet (500 mg) by mouth 3 times daily 40 tablet 4    metoprolol succinate ER (TOPROL XL) 50 MG 24 hr tablet Take 1 tablet (50 mg) by mouth daily 90 tablet 3    Multiple Vitamin (MULTI-VITAMIN) per tablet Take 1 tablet by mouth daily      naloxone (NARCAN) 4 MG/0.1ML nasal spray Spray 1 spray (4 mg) into one nostril alternating nostrils once as needed for opioid reversal every 2-3 minutes until assistance arrives 0.2 mL 0    ondansetron (ZOFRAN-ODT) 4 MG ODT tab Take 1 tablet (4 mg) by mouth every 12 hours as needed for nausea 180 tablet 3    spironolactone (ALDACTONE) 25 MG  tablet Take 2 tablets (50 mg) by mouth daily 180 tablet 3    tiZANidine (ZANAFLEX) 4 MG tablet TAKE 0.5-1 TABLET BY MOUTH 3 TIMES DAILY 60 tablet 0    Vaginal Lubricant (REPHRESH) GEL Place 2 g vaginally every 3 days 2 g 3              Physical Exam:   There were no vitals taken for this visit.  Gen: NAD, pleasant and cooperative   HEENT: extra-ocular muscles intact, normocepalic, without obvious abnormality, skin normal  Cardio: regular pulse  Pulm: non-labored breathing in room air  Thorax: patient has bruise in distal third of her sternum and medial part of left lower ribs, very tender to palpation.  Abd: benign  Ext: WWP, no edema in BLE, no tenderness in calves. Small nodule on anterior right shin, fluctuates, no erythema or increase in temperature noted.  Neuro/MSK: alert, oriented, BUE strength 5/5, BLE 5/5 except for right dorsiflexion 3/5.        Labs/Imaging:    Lab Results   Component Value Date    WBC 8.7 11/30/2023    HGB 12.6 11/30/2023    HCT 38.8 11/30/2023    MCV 88 11/30/2023     11/30/2023     Lab Results   Component Value Date     12/19/2023    POTASSIUM 4.1 12/19/2023    CHLORIDE 105 12/19/2023    CO2 23 12/19/2023     (H) 12/19/2023     Lab Results   Component Value Date    GFRESTIMATED 72 12/19/2023    GFRESTBLACK >90 05/07/2021    GFRESTBLACK >90 05/07/2021     Lab Results   Component Value Date    AST 25 11/30/2023    ALT 21 11/30/2023    ALKPHOS 91 11/30/2023    BILITOTAL 0.2 10/27/2022    BILICONJ 0.0 11/08/2012     Lab Results   Component Value Date    INR 0.85 (L) 08/20/2010     Lab Results   Component Value Date    BUN 17.8 12/19/2023    CR 0.86 12/19/2023              Assessment/Plan     Nadira Perez presents to clinic today for follow up reg his/her rehab needs in the setting of h/o left stage IIIC inflammatory breast cancer (ER/IL+ve, HER2-ve). Today patient came reporting intense pain in sternum and lower left ribs after a fall 4 days ago. The pain is  preventing her from moving freely for ADLs as well as taking deep breaths, she also reported pain is not well controlled with vicodin and ibuprofen. On the physical exam she presented with a bruise overlying the lower third of her sternum and medial side of left lower ribs, very tender to palpation and eliciting pain every time the patient would make small changes of position. Patient reported today she did not have any concerns abut her rehabilitation needs, her main concern was the pain on her sternum. She was agreeable to be transferred to the radiology department for xrays of her chest.    Work-up: chest xray 2 views to evaluate for sternal/rib fractures after fall  Therapy/equipment/braces:   Continue using right AFO.  Continue using walker for all transfers and ambulation.  Referral / follow up with other providers:   Continue to follow-up with pain management team.  Follow up: 3 months.    Patient seen and discussed with Dr. John MD Paola Toussaint Gonzalez PGY-2  Physical Medicine & Rehabilitation    Physician Attestation   I, Carmen Centeno MD, saw this patient and agree with the findings and plan of care as documented in the note.      Items personally reviewed/procedural attestation: vitals, labs, and imaging and agree with the interpretation documented in the note.    Carmen Centeno MD     50 minutes spent on the date of the encounter doing chart review, history and exam, documentation and further activities as noted above.      Addendum:  CXR done today demonstrated a mildly displaced sternal fracture at the inferior portion of the sternum with surrounding edema. Called patient, and she was instructed to go to the ED immediately for further assessment and management of displaced fracture and for adequate pain control. She is in agreement with this plan.    Carmen Centeno MD  Physical Medicine & Rehabilitation

## 2024-02-15 NOTE — LETTER
2/15/2024         RE: Nadira Perez  [id number]  [place] [place] [address]        Dear Colleague,    Thank you for referring your patient, Nadira Perez, to the [facility]. Please see a copy of my visit note below.    [facility]   PM&R clinic note        Interval history:     Nadira Perez presents to clinic today for follow up reg his/her rehab needs.   She has h/o left stage IIIC inflammatory breast cancer (ER/CT+ve, HER2-ve)    Was last seen in clinic 08/18  Recommendations included   Therapy/equipment/braces:  Continue regular home exercise regimen as tolerated.  Continue using right AFO.  Continue using walker for all transfers and ambulation.  Referral / follow up with other providers:  Continue to follow-up with pain management team.  Referral placed to dermatology for evaluation of right lower extremity lipoma  Follow up: 6 to 8 months    Medical issues since last visit,  -She is following with pain for back pain. She had a second epidural injection on 01/02.  -She also shares she was seen by dermatology for the mass on her right shin, the ultrasound showed a fluid collection. Patient reports Dr. Quiñonez tried to remove the fluid without the success. She reports the mass hurts, she is planning to ask Dr. Quiñonez to be referred to surgery.    Symptoms,  Ismael reports last Monday, while walking in her house without the AFO, lost balance due to her right foot drop. She hit herself in the sternum and left knee. Since then she has had intermittent pain in the lower third of her sternum, elicited by any change of position and deep breaths, she has been taking ibuprofen twice a day and vicodin to relieve the pan, she feels the pain is not improving and it makes it very hard for her to do her usual activities.    She also reports her AFO fits well, uses it all day. Mobility wise she reports she is stable, except for the past 4  days after he fell down at home.     Other problems were not addresses as patient was in a lot of pain in her sternum and ribs due to the fall. She was accompanied to have xrays done.    Social history is unchanged.    Medications:  Current Outpatient Medications   Medication Sig Dispense Refill    acetaminophen (TYLENOL) 500 MG tablet Take 500-1,000 mg by mouth every 6 hours as needed for mild pain      amLODIPine (NORVASC) 5 MG tablet Take 1 tablet (5 mg) by mouth daily 90 tablet 3    Ascorbic Acid (VITAMIN C) 500 MG CHEW Take 1 tablet by mouth daily      atorvastatin (LIPITOR) 80 MG tablet Take 1 tablet (80 mg) by mouth daily 90 tablet 3    biotin 1000 MCG TABS tablet Take 1,000 mcg by mouth daily      Cholecalciferol (VITAMIN D3) 50 MCG (2000 UT) CAPS TAKE 3 CAPSULES (6,000 UNITS) BY MOUTH DAILY. 270 capsule 3    CRANBERRY EXTRACT PO Take 650 mg by mouth daily ~10 am  Takes 1      diclofenac (VOLTAREN) 1 % topical gel Apply to toe or other joints painful 100 g 3    gabapentin (NEURONTIN) 300 MG capsule Take 4 capsules (1,200 mg) by mouth 3 times daily 1080 capsule 3    HEMP OIL OR EXTRACT OR OTHER CBD CANNABINOID, NOT MEDICAL CANNABIS, THC      HYDROcodone-acetaminophen (NORCO) 5-325 MG tablet Take 1 tab for breakthrough pain 1-2 times a week. 30 tablet 0    levothyroxine (SYNTHROID/LEVOTHROID) 112 MCG tablet TAKE 1/2 TAB BY MOUTH EVERY DAY 45 tablet 3    lisinopril (ZESTRIL) 40 MG tablet Take 1 tablet (40 mg) by mouth daily 90 tablet 3    magnesium 250 MG tablet Take 1 tablet by mouth daily      melatonin 5 MG tablet Take 10 mg by mouth At Bedtime       methocarbamol (ROBAXIN) 500 MG tablet Take 1 tablet (500 mg) by mouth 3 times daily 40 tablet 4    metoprolol succinate ER (TOPROL XL) 50 MG 24 hr tablet Take 1 tablet (50 mg) by mouth daily 90 tablet 3    Multiple Vitamin (MULTI-VITAMIN) per tablet Take 1 tablet by mouth daily      naloxone (NARCAN) 4 MG/0.1ML nasal spray Spray 1 spray (4 mg) into one nostril  alternating nostrils once as needed for opioid reversal every 2-3 minutes until assistance arrives 0.2 mL 0    ondansetron (ZOFRAN-ODT) 4 MG ODT tab Take 1 tablet (4 mg) by mouth every 12 hours as needed for nausea 180 tablet 3    spironolactone (ALDACTONE) 25 MG tablet Take 2 tablets (50 mg) by mouth daily 180 tablet 3    tiZANidine (ZANAFLEX) 4 MG tablet TAKE 0.5-1 TABLET BY MOUTH 3 TIMES DAILY 60 tablet 0    Vaginal Lubricant (REPHRESH) GEL Place 2 g vaginally every 3 days 2 g 3              Physical Exam:   There were no vitals taken for this visit.  Gen: NAD, pleasant and cooperative   HEENT: extra-ocular muscles intact, normocepalic, without obvious abnormality, skin normal  Cardio: regular pulse  Pulm: non-labored breathing in room air  Thorax: patient has bruise in distal third of her sternum and medial part of left lower ribs, very tender to palpation.  Abd: benign  Ext: WWP, no edema in BLE, no tenderness in calves. Small nodule on anterior right shin, fluctuates, no erythema or increase in temperature noted.  Neuro/MSK: alert, oriented, BUE strength 5/5, BLE 5/5 except for right dorsiflexion 3/5.        Labs/Imaging:    Lab Results   Component Value Date    WBC 8.7 11/30/2023    HGB 12.6 11/30/2023    HCT 38.8 11/30/2023    MCV 88 11/30/2023     11/30/2023     Lab Results   Component Value Date     12/19/2023    POTASSIUM 4.1 12/19/2023    CHLORIDE 105 12/19/2023    CO2 23 12/19/2023     (H) 12/19/2023     Lab Results   Component Value Date    GFRESTIMATED 72 12/19/2023    GFRESTBLACK >90 05/07/2021    GFRESTBLACK >90 05/07/2021     Lab Results   Component Value Date    AST 25 11/30/2023    ALT 21 11/30/2023    ALKPHOS 91 11/30/2023    BILITOTAL 0.2 10/27/2022    BILICONJ 0.0 11/08/2012     Lab Results   Component Value Date    INR 0.85 (L) 08/20/2010     Lab Results   Component Value Date    BUN 17.8 12/19/2023    CR 0.86 12/19/2023              Assessment/Plan     Nadira Perez  presents to clinic today for follow up reg his/her rehab needs in the setting of h/o left stage IIIC inflammatory breast cancer (ER/WA+ve, HER2-ve). Today patient came reporting intense pain in sternum and lower left ribs after a fall 4 days ago. The pain is preventing her from moving freely for ADLs as well as taking deep breaths, she also reported pain is not well controlled with vicodin and ibuprofen. On the physical exam she presented with a bruise overlying the lower third of her sternum and medial side of left lower ribs, very tender to palpation and eliciting pain every time the patient would make small changes of position. Patient reported today she did not have any concerns abut her rehabilitation needs, her main concern was the pain on her sternum. She was agreeable to be transferred to the radiology department for xrays of her chest.    Work-up: chest xray 2 views to evaluate for sternal/rib fractures after fall  Therapy/equipment/braces:   Continue using right AFO.  Continue using walker for all transfers and ambulation.  Referral / follow up with other providers:   Continue to follow-up with pain management team.  Follow up: 3 months.    Patient seen and discussed with Dr. John MD Paola Toussaint Gonzalez PGY-2  Physical Medicine & Rehabilitation    Physician Attestation   I, Carmen Centeno MD, saw this patient and agree with the findings and plan of care as documented in the note.      Items personally reviewed/procedural attestation: vitals, labs, and imaging and agree with the interpretation documented in the note.    Carmen Centeno MD     50 minutes spent on the date of the encounter doing chart review, history and exam, documentation and further activities as noted above.      Addendum:  CXR done today demonstrated a mildly displaced sternal fracture at the inferior portion of the sternum with surrounding edema. Called patient, and she was instructed to go to the ED immediately for further  assessment and management of displaced fracture and for adequate pain control. She is in agreement with this plan.    Carmen Centeno MD  Physical Medicine & Rehabilitation

## 2024-02-16 ENCOUNTER — APPOINTMENT (OUTPATIENT)
Dept: CT IMAGING | Facility: CLINIC | Age: 74
DRG: 292 | End: 2024-02-16
Attending: EMERGENCY MEDICINE
Payer: COMMERCIAL

## 2024-02-16 ENCOUNTER — HOSPITAL ENCOUNTER (INPATIENT)
Facility: CLINIC | Age: 74
LOS: 1 days | Discharge: HOME | DRG: 292 | End: 2024-02-17
Attending: EMERGENCY MEDICINE | Admitting: INTERNAL MEDICINE
Payer: COMMERCIAL

## 2024-02-16 ENCOUNTER — APPOINTMENT (OUTPATIENT)
Dept: CARDIOLOGY | Facility: CLINIC | Age: 74
DRG: 292 | End: 2024-02-16
Attending: EMERGENCY MEDICINE
Payer: COMMERCIAL

## 2024-02-16 DIAGNOSIS — R94.30 EJECTION FRACTION < 50%: ICD-10-CM

## 2024-02-16 DIAGNOSIS — M65.342 TRIGGER FINGER, LEFT RING FINGER: ICD-10-CM

## 2024-02-16 DIAGNOSIS — M54.17 LUMBOSACRAL RADICULOPATHY: ICD-10-CM

## 2024-02-16 DIAGNOSIS — R94.31 ABNORMAL ELECTROCARDIOGRAM: ICD-10-CM

## 2024-02-16 DIAGNOSIS — M80.08XG PATHOLOGICAL FRACTURE OF VERTEBRA DUE TO AGE-RELATED OSTEOPOROSIS WITH DELAYED HEALING, SUBSEQUENT ENCOUNTER: ICD-10-CM

## 2024-02-16 DIAGNOSIS — S22.20XA CLOSED FRACTURE OF STERNUM, UNSPECIFIED PORTION OF STERNUM, INITIAL ENCOUNTER: Primary | ICD-10-CM

## 2024-02-16 LAB
ANION GAP SERPL CALCULATED.3IONS-SCNC: 13 MMOL/L (ref 7–15)
ATRIAL RATE - MUSE: 89 BPM
BASOPHILS # BLD AUTO: 0.1 10E3/UL (ref 0–0.2)
BASOPHILS NFR BLD AUTO: 1 %
BUN SERPL-MCNC: 30.3 MG/DL (ref 8–23)
CALCIUM SERPL-MCNC: 10.1 MG/DL (ref 8.8–10.2)
CHLORIDE SERPL-SCNC: 105 MMOL/L (ref 98–107)
CREAT BLD-MCNC: 1.1 MG/DL (ref 0.5–1)
CREAT SERPL-MCNC: 0.93 MG/DL (ref 0.51–0.95)
DEPRECATED HCO3 PLAS-SCNC: 24 MMOL/L (ref 22–29)
DIASTOLIC BLOOD PRESSURE - MUSE: NORMAL MMHG
EGFRCR SERPLBLD CKD-EPI 2021: 53 ML/MIN/1.73M2
EGFRCR SERPLBLD CKD-EPI 2021: 65 ML/MIN/1.73M2
EOSINOPHIL # BLD AUTO: 0.3 10E3/UL (ref 0–0.7)
EOSINOPHIL NFR BLD AUTO: 3 %
ERYTHROCYTE [DISTWIDTH] IN BLOOD BY AUTOMATED COUNT: 15.3 % (ref 10–15)
GLUCOSE SERPL-MCNC: 106 MG/DL (ref 70–99)
HCT VFR BLD AUTO: 39.4 % (ref 35–47)
HGB BLD-MCNC: 12.3 G/DL (ref 11.7–15.7)
HOLD SPECIMEN: NORMAL
HOLD SPECIMEN: NORMAL
IMM GRANULOCYTES # BLD: 0.1 10E3/UL
IMM GRANULOCYTES NFR BLD: 1 %
INTERPRETATION ECG - MUSE: NORMAL
LVEF ECHO: NORMAL
LYMPHOCYTES # BLD AUTO: 1.3 10E3/UL (ref 0.8–5.3)
LYMPHOCYTES NFR BLD AUTO: 13 %
MCH RBC QN AUTO: 29.1 PG (ref 26.5–33)
MCHC RBC AUTO-ENTMCNC: 31.2 G/DL (ref 31.5–36.5)
MCV RBC AUTO: 93 FL (ref 78–100)
MONOCYTES # BLD AUTO: 0.9 10E3/UL (ref 0–1.3)
MONOCYTES NFR BLD AUTO: 9 %
NEUTROPHILS # BLD AUTO: 7.2 10E3/UL (ref 1.6–8.3)
NEUTROPHILS NFR BLD AUTO: 73 %
NRBC # BLD AUTO: 0 10E3/UL
NRBC BLD AUTO-RTO: 0 /100
P AXIS - MUSE: 11 DEGREES
PLATELET # BLD AUTO: 306 10E3/UL (ref 150–450)
POTASSIUM SERPL-SCNC: 4 MMOL/L (ref 3.4–5.3)
PR INTERVAL - MUSE: 170 MS
QRS DURATION - MUSE: 122 MS
QT - MUSE: 394 MS
QTC - MUSE: 479 MS
R AXIS - MUSE: 68 DEGREES
RBC # BLD AUTO: 4.23 10E6/UL (ref 3.8–5.2)
SODIUM SERPL-SCNC: 142 MMOL/L (ref 135–145)
SYSTOLIC BLOOD PRESSURE - MUSE: NORMAL MMHG
T AXIS - MUSE: 1 DEGREES
TROPONIN T SERPL HS-MCNC: 26 NG/L
TROPONIN T SERPL HS-MCNC: 31 NG/L
VENTRICULAR RATE- MUSE: 89 BPM
WBC # BLD AUTO: 9.8 10E3/UL (ref 4–11)

## 2024-02-16 PROCEDURE — 250N000011 HC RX IP 250 OP 636: Performed by: EMERGENCY MEDICINE

## 2024-02-16 PROCEDURE — 999N000208 ECHOCARDIOGRAM COMPLETE

## 2024-02-16 PROCEDURE — 99285 EMERGENCY DEPT VISIT HI MDM: CPT | Mod: 25

## 2024-02-16 PROCEDURE — 255N000002 HC RX 255 OP 636: Performed by: EMERGENCY MEDICINE

## 2024-02-16 PROCEDURE — 85025 COMPLETE CBC W/AUTO DIFF WBC: CPT | Performed by: EMERGENCY MEDICINE

## 2024-02-16 PROCEDURE — 84484 ASSAY OF TROPONIN QUANT: CPT | Performed by: EMERGENCY MEDICINE

## 2024-02-16 PROCEDURE — 84520 ASSAY OF UREA NITROGEN: CPT | Performed by: EMERGENCY MEDICINE

## 2024-02-16 PROCEDURE — 250N000011 HC RX IP 250 OP 636: Performed by: PHYSICIAN ASSISTANT

## 2024-02-16 PROCEDURE — 36415 COLL VENOUS BLD VENIPUNCTURE: CPT | Performed by: EMERGENCY MEDICINE

## 2024-02-16 PROCEDURE — 120N000001 HC R&B MED SURG/OB

## 2024-02-16 PROCEDURE — 93005 ELECTROCARDIOGRAM TRACING: CPT

## 2024-02-16 PROCEDURE — 71260 CT THORAX DX C+: CPT

## 2024-02-16 PROCEDURE — 93005 ELECTROCARDIOGRAM TRACING: CPT | Mod: 76

## 2024-02-16 PROCEDURE — 250N000013 HC RX MED GY IP 250 OP 250 PS 637: Performed by: EMERGENCY MEDICINE

## 2024-02-16 PROCEDURE — 93306 TTE W/DOPPLER COMPLETE: CPT | Mod: 26 | Performed by: INTERNAL MEDICINE

## 2024-02-16 PROCEDURE — 82565 ASSAY OF CREATININE: CPT

## 2024-02-16 PROCEDURE — 99222 1ST HOSP IP/OBS MODERATE 55: CPT | Performed by: PHYSICIAN ASSISTANT

## 2024-02-16 PROCEDURE — 96374 THER/PROPH/DIAG INJ IV PUSH: CPT | Mod: 59

## 2024-02-16 PROCEDURE — 80048 BASIC METABOLIC PNL TOTAL CA: CPT | Performed by: EMERGENCY MEDICINE

## 2024-02-16 RX ORDER — HYDROMORPHONE HCL IN WATER/PF 6 MG/30 ML
0.2 PATIENT CONTROLLED ANALGESIA SYRINGE INTRAVENOUS
Status: DISCONTINUED | OUTPATIENT
Start: 2024-02-16 | End: 2024-02-17 | Stop reason: HOSPADM

## 2024-02-16 RX ORDER — ONDANSETRON 2 MG/ML
4 INJECTION INTRAMUSCULAR; INTRAVENOUS EVERY 6 HOURS PRN
Status: DISCONTINUED | OUTPATIENT
Start: 2024-02-16 | End: 2024-02-17 | Stop reason: HOSPADM

## 2024-02-16 RX ORDER — NALOXONE HYDROCHLORIDE 0.4 MG/ML
0.2 INJECTION, SOLUTION INTRAMUSCULAR; INTRAVENOUS; SUBCUTANEOUS
Status: DISCONTINUED | OUTPATIENT
Start: 2024-02-16 | End: 2024-02-17 | Stop reason: HOSPADM

## 2024-02-16 RX ORDER — NALOXONE HYDROCHLORIDE 0.4 MG/ML
0.4 INJECTION, SOLUTION INTRAMUSCULAR; INTRAVENOUS; SUBCUTANEOUS
Status: DISCONTINUED | OUTPATIENT
Start: 2024-02-16 | End: 2024-02-17 | Stop reason: HOSPADM

## 2024-02-16 RX ORDER — ACETAMINOPHEN 650 MG/1
650 SUPPOSITORY RECTAL EVERY 6 HOURS
Status: DISCONTINUED | OUTPATIENT
Start: 2024-02-17 | End: 2024-02-17 | Stop reason: HOSPADM

## 2024-02-16 RX ORDER — METOPROLOL SUCCINATE 50 MG/1
50 TABLET, EXTENDED RELEASE ORAL DAILY
Status: DISCONTINUED | OUTPATIENT
Start: 2024-02-17 | End: 2024-02-17 | Stop reason: HOSPADM

## 2024-02-16 RX ORDER — LISINOPRIL 40 MG/1
40 TABLET ORAL DAILY
Status: DISCONTINUED | OUTPATIENT
Start: 2024-02-17 | End: 2024-02-17 | Stop reason: HOSPADM

## 2024-02-16 RX ORDER — AMLODIPINE BESYLATE 5 MG/1
5 TABLET ORAL DAILY
Status: DISCONTINUED | OUTPATIENT
Start: 2024-02-17 | End: 2024-02-17 | Stop reason: HOSPADM

## 2024-02-16 RX ORDER — MORPHINE SULFATE 2 MG/ML
2 INJECTION, SOLUTION INTRAMUSCULAR; INTRAVENOUS ONCE
Status: DISCONTINUED | OUTPATIENT
Start: 2024-02-16 | End: 2024-02-16 | Stop reason: ALTCHOICE

## 2024-02-16 RX ORDER — HYDROCODONE BITARTRATE AND ACETAMINOPHEN 5; 325 MG/1; MG/1
1 TABLET ORAL ONCE
Status: COMPLETED | OUTPATIENT
Start: 2024-02-16 | End: 2024-02-16

## 2024-02-16 RX ORDER — HYDROXYZINE HYDROCHLORIDE 50 MG/1
50 TABLET, FILM COATED ORAL EVERY 6 HOURS PRN
Status: DISCONTINUED | OUTPATIENT
Start: 2024-02-16 | End: 2024-02-17 | Stop reason: HOSPADM

## 2024-02-16 RX ORDER — IBUPROFEN 200 MG
600-800 TABLET ORAL DAILY PRN
Status: ON HOLD | COMMUNITY
End: 2024-02-17

## 2024-02-16 RX ORDER — HYDROXYZINE HYDROCHLORIDE 25 MG/1
25 TABLET, FILM COATED ORAL EVERY 6 HOURS PRN
Status: DISCONTINUED | OUTPATIENT
Start: 2024-02-16 | End: 2024-02-17 | Stop reason: HOSPADM

## 2024-02-16 RX ORDER — HYDROMORPHONE HCL IN WATER/PF 6 MG/30 ML
0.4 PATIENT CONTROLLED ANALGESIA SYRINGE INTRAVENOUS
Status: DISCONTINUED | OUTPATIENT
Start: 2024-02-16 | End: 2024-02-17 | Stop reason: HOSPADM

## 2024-02-16 RX ORDER — TIZANIDINE 2 MG/1
4 TABLET ORAL EVERY 8 HOURS PRN
Status: DISCONTINUED | OUTPATIENT
Start: 2024-02-16 | End: 2024-02-17 | Stop reason: HOSPADM

## 2024-02-16 RX ORDER — SPIRONOLACTONE 25 MG/1
25 TABLET ORAL
COMMUNITY
End: 2024-09-16

## 2024-02-16 RX ORDER — CALCIUM CARBONATE 500 MG/1
1000 TABLET, CHEWABLE ORAL 4 TIMES DAILY PRN
Status: DISCONTINUED | OUTPATIENT
Start: 2024-02-16 | End: 2024-02-17 | Stop reason: HOSPADM

## 2024-02-16 RX ORDER — HYDROCODONE BITARTRATE AND ACETAMINOPHEN 5; 325 MG/1; MG/1
1 TABLET ORAL EVERY 6 HOURS PRN
Qty: 20 TABLET | Refills: 0 | Status: SHIPPED | OUTPATIENT
Start: 2024-02-16 | End: 2024-02-16

## 2024-02-16 RX ORDER — GABAPENTIN 400 MG/1
1200 CAPSULE ORAL 3 TIMES DAILY
Status: DISCONTINUED | OUTPATIENT
Start: 2024-02-16 | End: 2024-02-17 | Stop reason: HOSPADM

## 2024-02-16 RX ORDER — AMOXICILLIN 250 MG
2 CAPSULE ORAL 2 TIMES DAILY PRN
Status: DISCONTINUED | OUTPATIENT
Start: 2024-02-16 | End: 2024-02-17 | Stop reason: HOSPADM

## 2024-02-16 RX ORDER — LIDOCAINE 40 MG/G
CREAM TOPICAL
Status: DISCONTINUED | OUTPATIENT
Start: 2024-02-16 | End: 2024-02-17 | Stop reason: HOSPADM

## 2024-02-16 RX ORDER — HYDROMORPHONE HYDROCHLORIDE 1 MG/ML
0.3 INJECTION, SOLUTION INTRAMUSCULAR; INTRAVENOUS; SUBCUTANEOUS ONCE
Status: COMPLETED | OUTPATIENT
Start: 2024-02-16 | End: 2024-02-16

## 2024-02-16 RX ORDER — ONDANSETRON 4 MG/1
4 TABLET, ORALLY DISINTEGRATING ORAL EVERY 6 HOURS PRN
Status: DISCONTINUED | OUTPATIENT
Start: 2024-02-16 | End: 2024-02-17 | Stop reason: HOSPADM

## 2024-02-16 RX ORDER — MAGNESIUM OXIDE 400 MG/1
400 TABLET ORAL EVERY EVENING
Status: DISCONTINUED | OUTPATIENT
Start: 2024-02-16 | End: 2024-02-17 | Stop reason: HOSPADM

## 2024-02-16 RX ORDER — LEVOTHYROXINE SODIUM 112 UG/1
56 TABLET ORAL
Status: DISCONTINUED | OUTPATIENT
Start: 2024-02-17 | End: 2024-02-17 | Stop reason: HOSPADM

## 2024-02-16 RX ORDER — IOPAMIDOL 755 MG/ML
500 INJECTION, SOLUTION INTRAVASCULAR ONCE
Status: COMPLETED | OUTPATIENT
Start: 2024-02-16 | End: 2024-02-16

## 2024-02-16 RX ORDER — ATORVASTATIN CALCIUM 40 MG/1
80 TABLET, FILM COATED ORAL EVERY EVENING
Status: DISCONTINUED | OUTPATIENT
Start: 2024-02-16 | End: 2024-02-17 | Stop reason: HOSPADM

## 2024-02-16 RX ORDER — OXYCODONE HYDROCHLORIDE 5 MG/1
10 TABLET ORAL EVERY 4 HOURS PRN
Status: DISCONTINUED | OUTPATIENT
Start: 2024-02-16 | End: 2024-02-17 | Stop reason: HOSPADM

## 2024-02-16 RX ORDER — SPIRONOLACTONE 25 MG/1
25 TABLET ORAL 2 TIMES DAILY
Status: DISCONTINUED | OUTPATIENT
Start: 2024-02-17 | End: 2024-02-17 | Stop reason: HOSPADM

## 2024-02-16 RX ORDER — OXYCODONE HYDROCHLORIDE 5 MG/1
5 TABLET ORAL EVERY 4 HOURS PRN
Status: DISCONTINUED | OUTPATIENT
Start: 2024-02-16 | End: 2024-02-17 | Stop reason: HOSPADM

## 2024-02-16 RX ORDER — AMOXICILLIN 250 MG
1 CAPSULE ORAL 2 TIMES DAILY PRN
Status: DISCONTINUED | OUTPATIENT
Start: 2024-02-16 | End: 2024-02-17 | Stop reason: HOSPADM

## 2024-02-16 RX ORDER — ACETAMINOPHEN 325 MG/1
975 TABLET ORAL EVERY 6 HOURS
Status: DISCONTINUED | OUTPATIENT
Start: 2024-02-17 | End: 2024-02-17 | Stop reason: HOSPADM

## 2024-02-16 RX ADMIN — IOPAMIDOL 94 ML: 755 INJECTION, SOLUTION INTRAVENOUS at 11:41

## 2024-02-16 RX ADMIN — HYDROCODONE BITARTRATE AND ACETAMINOPHEN 1 TABLET: 5; 325 TABLET ORAL at 16:12

## 2024-02-16 RX ADMIN — HYDROMORPHONE HYDROCHLORIDE 0.3 MG: 1 INJECTION, SOLUTION INTRAMUSCULAR; INTRAVENOUS; SUBCUTANEOUS at 20:21

## 2024-02-16 RX ADMIN — HYDROCODONE BITARTRATE AND ACETAMINOPHEN 1 TABLET: 5; 325 TABLET ORAL at 09:57

## 2024-02-16 RX ADMIN — HUMAN ALBUMIN MICROSPHERES AND PERFLUTREN 4 ML: 10; .22 INJECTION, SOLUTION INTRAVENOUS at 16:04

## 2024-02-16 RX ADMIN — HYDROMORPHONE HYDROCHLORIDE 0.4 MG: 0.2 INJECTION, SOLUTION INTRAMUSCULAR; INTRAVENOUS; SUBCUTANEOUS at 23:57

## 2024-02-16 ASSESSMENT — ENCOUNTER SYMPTOMS
DYSURIA: 0
SHORTNESS OF BREATH: 0
CHILLS: 0
NECK PAIN: 0
DIZZINESS: 0
LIGHT-HEADEDNESS: 0
RHINORRHEA: 0
NUMBNESS: 0
WEAKNESS: 0
VOMITING: 0
ABDOMINAL PAIN: 0
COLOR CHANGE: 0
FEVER: 0
COUGH: 0
NAUSEA: 0
BACK PAIN: 0
HEMATURIA: 0

## 2024-02-16 ASSESSMENT — ACTIVITIES OF DAILY LIVING (ADL)
ADLS_ACUITY_SCORE: 37

## 2024-02-16 NOTE — PATIENT INSTRUCTIONS
It was nice to see you again today.    1. As we discussed, your chest xray today shows a displaced sternal fracture. We recommend going to the Emergency Department for further evaluation and management of your fracture and for pain control. You may also need a CT of your chest for further detail and any other injury, and this can be done in the ED.  2. We will plan a return visit in 3-4 months with Dr. Centeno.

## 2024-02-16 NOTE — ED TRIAGE NOTES
"Pt reports hx foot drop and hx of multiple falls. Last fall Monday hitting chest against file cabinet. Pt at rehab yesterday with chest xray showing \"fractured sternum.\" Pt reports pain. ABC in tact. A/OX4     Triage Assessment (Adult)       Row Name 02/16/24 0927          Triage Assessment    Airway WDL WDL        Respiratory WDL    Respiratory WDL WDL        Skin Circulation/Temperature WDL    Skin Circulation/Temperature WDL WDL        Cardiac WDL    Cardiac WDL WDL        Peripheral/Neurovascular WDL    Peripheral Neurovascular WDL WDL                     "

## 2024-02-16 NOTE — ED PROVIDER NOTES
History     Chief Complaint:  Fractured Sternum     The history is provided by the patient.      Nadira Perez is a 73 year old female with a history of hypertension, hyperlipidemia, and breast cancer who presents after fall onto her sternum four days ago and hit her sternum on the cabinet. She hit both knees and has left knee pain more than right. She cannot take a deep breath in without pain. Pain varies from 6-8 out of 10. She has some nausea yesterday. She just took a Norco on the way to the ED one hour ago. Pain began worsening yesterday. She has been able to ambulate. Yesterday morning pain worsened. She notes she is an oncology patient. She notes she asked for a chest x-ray at her rehab facility and notes she has a fractured sternum was recommended to present to the ED. She notes she took her last Norco and adds she normally asks for 10 pills per month for her chronic back pain. She used 20 this week since the fall. Her Hydrocodone 5 mg has been helping with the pain. She went off Advil due to blood pressure but went back on it and has been taking it to help with her sternal pain.  She notes she thinks she has a hiatal hernia which has worsened with this pain. She notes she lives independently. She has been checking her blood pressure at home. She denies lightheadedness, dizziness, syncope, nausea, vomiting, abdominal pain, numbness or weakness one side or the other, new redness, or other injuries. She denies allergies to contrast dye. She notes history of radiation for which she has radiation burns on her abdomen.     Review of Systems   Constitutional:  Negative for chills and fever.   HENT:  Negative for congestion and rhinorrhea.    Respiratory:  Negative for cough and shortness of breath.    Cardiovascular:  Positive for chest pain.   Gastrointestinal:  Negative for abdominal pain, nausea and vomiting.   Genitourinary:  Negative for dysuria and hematuria.   Musculoskeletal:  Negative for back pain  and neck pain.   Skin:  Negative for color change.   Neurological:  Negative for dizziness, syncope, weakness, light-headedness and numbness.   All other systems reviewed and are negative.    Independent Historian:   None - Patient Only    Review of External Notes:   I reviewed the patient's PDMP. I reviewed her most recent x-ray.    Medications:    Amlodipine   Ascorbic Acid  Atorvastatin   Cholecalciferol   Gabapentin   Hemp oil   Hydrocodone-acetaminophen  Levothyroxine   Lisinopril   Magnesium   Methocarbamol   Metoprolol succinate ER  Naloxone nasal spray  Ondansetron   Spironolactone   Tizanidine     Past Medical History:    Arthritis  Bone disease  Breast cancer  Diabetes  H/O kyphoplasty  History of blood transfusion, kidney stones, and radiation therapy   Hyperlipidemia  Hypertension  Hypothyroidism   Kidney problem  Lumbosacral radiculopathy   Medullary sponge kidney  PONV  Reduced vision  Squamous cell skin cancer  Thyroid disease    Past Surgical History:    Ovarian cyst, mesh  Arthrodesis, wrist, right, left  Blepharoplasty bilateral  Cataract, rt/lt  EGD, combined  External ear surgery  Radial keratomy   Fusion, spine, interbody, oblique anterior and lumbar, two levels, post approach  GI surgery  Graft bone from iliac crest  HC breath hydrogen test  Hernia repair  Hysterectomy total abdominal  Inject epidural caudal  Inject epidural lumbar  Inject joint sacral iliac  Mastectomy modified radical, bilateral  Parathyroidectomy X2  Release carpal tunnel, right  Rhinoplasty  Thyroplasty  Tonsillectomy  Vitrectomy parsplana with 25 gauge system, left    Physical Exam   Patient Vitals for the past 24 hrs:   BP Temp Temp src Pulse Resp SpO2   02/16/24 1649 -- -- -- 89 29 --   02/16/24 1648 -- -- -- 90 21 --   02/16/24 1647 -- -- -- 86 19 --   02/16/24 1632 -- -- -- -- 17 97 %   02/16/24 1631 -- -- -- 75 25 97 %   02/16/24 1630 -- -- -- 73 10 98 %   02/16/24 1629 -- -- -- 70 17 98 %   02/16/24 1628 -- -- -- 75  20 98 %   02/16/24 1619 -- -- -- 73 (!) 8 --   02/16/24 1614 (!) 157/102 -- -- 77 24 --   02/16/24 1432 (!) 156/100 -- -- 84 20 98 %   02/16/24 1000 (!) 138/92 -- -- -- -- --   02/16/24 0930 (!) 146/100 98.8  F (37.1  C) Temporal 92 18 94 %      Constitutional:       Appearance: Intermittently wincing in pain.   HENT:      Head: Normocephalic and atraumatic.   Eyes:      Extraocular Movements: Extraocular movements intact.      Conjunctiva/sclera: Conjunctivae normal.   Cardiovascular:      Rate and Rhythm: Regular rate and rhythm.  Pulmonary:      Effort: Pulmonary effort is normal. No respiratory distress.      Breath sounds: Normal breath sounds.  Abdominal:      General: Abdomen is flat. There is no distension.      Palpations: Abdomen is soft.      Tenderness: There is no abdominal tenderness.   Musculoskeletal:      General: Sternal tenderness to palpation.  Mild healed bruising in the lower sternal chest wall.     Cervical back: Normal range of motion. No rigidity.      Right lower leg: No edema.      Left lower leg: No edema. Minimal bruising to left knee. No obvious erythema or swelling. Patient ambulatory.  Skin:     General: Skin is warm and dry.   Neurological:      General: No focal deficit present.      Mental Status: Alert and oriented to person, place, and time.   Psychiatric:         Mood and Affect: Mood normal.         Behavior: Behavior normal.    Emergency Department Course   ECG  ECG results from 02/16/24   EKG 12-lead, tracing only     Value    Systolic Blood Pressure     Diastolic Blood Pressure     Ventricular Rate 89    Atrial Rate 89    KS Interval 170    QRS Duration 122        QTc 479    P Axis 11    R AXIS 68    T Axis 1    Interpretation ECG      Sinus rhythm  Right bundle branch block  Inferior infarct , age undetermined  Abnormal ECG  When compared with ECG of 04-DEC-2023 18:13,  Premature ventricular complexes are no longer Present  Questionable change in QRS axis  Inferior  infarct is now Present  T wave inversion now evident in Inferior leads  Unconfirmed report - interpretation of this ECG is computer generated - see medical record for final interpretation  Confirmed by - EMERGENCY ROOM, PHYSICIAN ([id number]),  Juice Luke ([id number]) on 2/16/2024 10:53:43 AM     EKG 12-lead, tracing only     Value    Systolic Blood Pressure     Diastolic Blood Pressure     Ventricular Rate 73    Atrial Rate 73    HI Interval 188    QRS Duration 132        QTc 471    P Axis 47    R AXIS 67    T Axis 39    Interpretation ECG      Sinus rhythm  Right bundle branch block  Abnormal ECG  When compared with ECG of 16-FEB-2024 09:55,  Criteria for Inferior infarct are no longer Present  ST no longer depressed in Anterior leads  T wave inversion no longer evident in Inferior leads       *Note: Due to a large number of results and/or encounters for the requested time period, some results have not been displayed. A complete set of results can be found in Results Review.      See ED course for independent interpretation.     Imaging:  Echocardiogram Complete   Final Result      Chest CT w IV contrast only, TRAUMA / DISSECTION   Final Result   IMPRESSION:   1.  Acute mildly displaced fracture of the inferior sternal body with   mild surrounding inflammatory stranding. No defined soft tissue or   mediastinal hematoma.   2.  No additional acute findings within the chest.   3.  Unchanged chronic findings as above.      ERVIN PEREZ MD            SYSTEM ID:  [id number]         Report per radiology    Laboratory:  Labs Ordered and Resulted from Time of ED Arrival to Time of ED Departure   BASIC METABOLIC PANEL - Abnormal       Result Value    Sodium 142      Potassium 4.0      Chloride 105      Carbon Dioxide (CO2) 24      Anion Gap 13      Urea Nitrogen 30.3 (*)     Creatinine 0.93      GFR Estimate 65      Calcium 10.1      Glucose 106 (*)    TROPONIN T, HIGH SENSITIVITY - Abnormal    Troponin T, High  Sensitivity 31 (*)    CBC WITH PLATELETS AND DIFFERENTIAL - Abnormal    WBC Count 9.8      RBC Count 4.23      Hemoglobin 12.3      Hematocrit 39.4      MCV 93      MCH 29.1      MCHC 31.2 (*)     RDW 15.3 (*)     Platelet Count 306      % Neutrophils 73      % Lymphocytes 13      % Monocytes 9      % Eosinophils 3      % Basophils 1      % Immature Granulocytes 1      NRBCs per 100 WBC 0      Absolute Neutrophils 7.2      Absolute Lymphocytes 1.3      Absolute Monocytes 0.9      Absolute Eosinophils 0.3      Absolute Basophils 0.1      Absolute Immature Granulocytes 0.1      Absolute NRBCs 0.0     ISTAT CREATININE POCT - Abnormal    Creatinine POCT 1.1 (*)     GFR, ESTIMATED POCT 53 (*)    TROPONIN T, HIGH SENSITIVITY - Abnormal    Troponin T, High Sensitivity 26 (*)       Emergency Department Course & Assessments:    Interventions:  Medications   HYDROcodone-acetaminophen (NORCO) 5-325 MG per tablet 1 tablet (1 tablet Oral $Given 2/16/24 0957)   sodium chloride (PF) 0.9% PF flush 100 mL (71 mLs Intravenous $Given 2/16/24 1141)   iopamidol (ISOVUE-370) solution 500 mL (94 mLs Intravenous $Given 2/16/24 1141)   perflutren diluted 1mL to 2mL with saline (OPTISON) diluted injection 4 mL (4 mLs Intravenous $Given 2/16/24 1604)   sodium chloride (PF) 0.9% PF flush 10 mL (10 mLs Intravenous $Given 2/16/24 1604)   HYDROcodone-acetaminophen (NORCO) 5-325 MG per tablet 1 tablet (1 tablet Oral $Given 2/16/24 1612)      Independent Interpretation (X-rays, CTs, rhythm strip):  See ED course.     Assessments/Consultations/Discussion of Management or Tests:  ED Course as of 02/16/24 1733   Fri Feb 16, 2024   0942 I obtained the patient's history and examined as noted above.    0958 EKG 12-lead, tracing only  Normal sinus rhythm.  Rate of 89.  Normal WI.  .  New right bundle branch block.  QTc 479.  No acute ST elevation or depression as compared with 12/4/23 EKG.     1117 Troponin T, High Sensitivity(!): 31  Rpt trop  ordered. No prior for comparison.   1414 Troponin T, High Sensitivity(!): 26  Delta less than 7   1500 I rechecked the patient and performed an bedside ultrasound.  No obvious pericardial effusion.  Difficult subxiphoid view.  Concern for RV wall motion irregularity.  Will obtain formal stat echocardiogram for further disposition planning.   1621 Patient in bed in no acute distress.  Pending echocardiogram result.  P.o. Norco given.   1630 EKG 12-lead, tracing only  Normal sinus rhythm.  Rate of 73.  Right bundle branch block.  .  QTc 471.  T wave inversions in V1 through V3.  V4 T wave inversion now upright.  No acute ST elevation or depression as compared with prior ECG.   1639 Patient updated on echocardiogram result of newly reduced ejection fraction, hyperdynamic EKG, and plan for admission.  Patient voiced understanding and agreement with plan.     Social Determinants of Health affecting care:   None    Disposition:  Care transferred to my colleague Dr. Albrecht pending hospitalist callback for admission.    Impression & Plan      Medical Decision Makin-year-old female as described above presents to the emergency department for sternal fracture and uncontrolled pain.  Patient states that she normally takes Vicodin for her cancer pain, but since her fall and new sternal fracture, she has ran out of of her last dose of Vicodin.  Vicodin does help out with her pain.  Patient hemodynamically stable at time of evaluation.  Afebrile.  Vital signs stable.  Will obtain CT chest with contrast for evaluation for acute cardiac/aortic injury.  Evaluate for underlying rib fractures around the sternum.  1 set cardiac enzyme and EKG for evaluation for cardiac contusion.  If workup negative, likely discharge with short course pain control and referred to outpatient follow-up with primary versus cardiothoracic surgery.  Discussed care plan with patient who voiced understanding and agreement with plan.  Answered all  questions.  Additional workup and orders as listed in chart.    Please refer to ED course above as part of continuation of MDM for details on the patient's treatment course and any changes or updates in care plan beyond my initial evaluation and MDM creation.    Diagnosis:    ICD-10-CM    1. Closed fracture of sternum, unspecified portion of sternum, initial encounter  S22.20XA       2. Abnormal electrocardiogram  R94.31       3. Ejection fraction < 50%  R94.30            Discharge Medications:  Current Discharge Medication List         Scribe Disclosure:  I, Maya Garcia, am serving as a scribe at 9:51 AM on 2/16/2024 to document services personally performed by Poli Jorge DO based on my observations and the provider's statements to me.      2/16/2024   Poli Jorge DO Yeh, Ferris, DO  02/16/24 1733

## 2024-02-16 NOTE — DISCHARGE INSTRUCTIONS
Please follow-up with your primary care provider and/or specialist regarding your visit to the ER today.    Please return to the emergency department should you experience any of the symptoms we specifically discussed, including but not limited to recurrence or worsening of your symptoms, or development of any new and concerning symptoms.   Skyrizi Counseling: I discussed with the patient the risks of risankizumab-rzaa including but not limited to immunosuppression, and serious infections.  The patient understands that monitoring is required including a PPD at baseline and must alert us or the primary physician if symptoms of infection or other concerning signs are noted.

## 2024-02-17 VITALS
RESPIRATION RATE: 16 BRPM | TEMPERATURE: 98.5 F | OXYGEN SATURATION: 95 % | SYSTOLIC BLOOD PRESSURE: 134 MMHG | BODY MASS INDEX: 30.54 KG/M2 | DIASTOLIC BLOOD PRESSURE: 80 MMHG | HEART RATE: 91 BPM | WEIGHT: 189.1 LBS

## 2024-02-17 DIAGNOSIS — S93.512A SPRAIN OF INTERPHALANGEAL JOINT OF LEFT GREAT TOE, INITIAL ENCOUNTER: ICD-10-CM

## 2024-02-17 LAB
ANION GAP SERPL CALCULATED.3IONS-SCNC: 12 MMOL/L (ref 7–15)
BASOPHILS # BLD AUTO: 0.1 10E3/UL (ref 0–0.2)
BASOPHILS NFR BLD AUTO: 1 %
BUN SERPL-MCNC: 24.1 MG/DL (ref 8–23)
CALCIUM SERPL-MCNC: 9.3 MG/DL (ref 8.8–10.2)
CHLORIDE SERPL-SCNC: 103 MMOL/L (ref 98–107)
CREAT SERPL-MCNC: 0.78 MG/DL (ref 0.51–0.95)
DEPRECATED HCO3 PLAS-SCNC: 25 MMOL/L (ref 22–29)
EGFRCR SERPLBLD CKD-EPI 2021: 81 ML/MIN/1.73M2
EOSINOPHIL # BLD AUTO: 0.2 10E3/UL (ref 0–0.7)
EOSINOPHIL NFR BLD AUTO: 2 %
ERYTHROCYTE [DISTWIDTH] IN BLOOD BY AUTOMATED COUNT: 15.2 % (ref 10–15)
GLUCOSE SERPL-MCNC: 113 MG/DL (ref 70–99)
HCT VFR BLD AUTO: 37.3 % (ref 35–47)
HGB BLD-MCNC: 11.9 G/DL (ref 11.7–15.7)
IMM GRANULOCYTES # BLD: 0.1 10E3/UL
IMM GRANULOCYTES NFR BLD: 1 %
LYMPHOCYTES # BLD AUTO: 1.4 10E3/UL (ref 0.8–5.3)
LYMPHOCYTES NFR BLD AUTO: 14 %
MCH RBC QN AUTO: 29.2 PG (ref 26.5–33)
MCHC RBC AUTO-ENTMCNC: 31.9 G/DL (ref 31.5–36.5)
MCV RBC AUTO: 91 FL (ref 78–100)
MONOCYTES # BLD AUTO: 0.7 10E3/UL (ref 0–1.3)
MONOCYTES NFR BLD AUTO: 7 %
NEUTROPHILS # BLD AUTO: 7.8 10E3/UL (ref 1.6–8.3)
NEUTROPHILS NFR BLD AUTO: 75 %
NRBC # BLD AUTO: 0 10E3/UL
NRBC BLD AUTO-RTO: 0 /100
PLATELET # BLD AUTO: 303 10E3/UL (ref 150–450)
POTASSIUM SERPL-SCNC: 4.5 MMOL/L (ref 3.4–5.3)
RBC # BLD AUTO: 4.08 10E6/UL (ref 3.8–5.2)
SODIUM SERPL-SCNC: 140 MMOL/L (ref 135–145)
WBC # BLD AUTO: 10.3 10E3/UL (ref 4–11)

## 2024-02-17 PROCEDURE — 250N000011 HC RX IP 250 OP 636: Performed by: PHYSICIAN ASSISTANT

## 2024-02-17 PROCEDURE — 250N000013 HC RX MED GY IP 250 OP 250 PS 637: Performed by: PHYSICIAN ASSISTANT

## 2024-02-17 PROCEDURE — 85004 AUTOMATED DIFF WBC COUNT: CPT | Performed by: PHYSICIAN ASSISTANT

## 2024-02-17 PROCEDURE — 99223 1ST HOSP IP/OBS HIGH 75: CPT | Performed by: INTERNAL MEDICINE

## 2024-02-17 PROCEDURE — 36415 COLL VENOUS BLD VENIPUNCTURE: CPT | Performed by: PHYSICIAN ASSISTANT

## 2024-02-17 PROCEDURE — 80048 BASIC METABOLIC PNL TOTAL CA: CPT | Performed by: PHYSICIAN ASSISTANT

## 2024-02-17 PROCEDURE — 99239 HOSP IP/OBS DSCHRG MGMT >30: CPT | Performed by: INTERNAL MEDICINE

## 2024-02-17 RX ORDER — HYDROCODONE BITARTRATE AND ACETAMINOPHEN 5; 325 MG/1; MG/1
1 TABLET ORAL EVERY 6 HOURS PRN
Qty: 10 TABLET | Refills: 0 | Status: SHIPPED | OUTPATIENT
Start: 2024-02-17 | End: 2024-02-20

## 2024-02-17 RX ADMIN — GABAPENTIN 1200 MG: 400 CAPSULE ORAL at 08:09

## 2024-02-17 RX ADMIN — MAGNESIUM OXIDE TAB 400 MG (241.3 MG ELEMENTAL MG) 400 MG: 400 (241.3 MG) TAB at 01:25

## 2024-02-17 RX ADMIN — TIZANIDINE 4 MG: 2 TABLET ORAL at 01:35

## 2024-02-17 RX ADMIN — ONDANSETRON 4 MG: 4 TABLET, ORALLY DISINTEGRATING ORAL at 08:10

## 2024-02-17 RX ADMIN — METOPROLOL SUCCINATE 50 MG: 50 TABLET, EXTENDED RELEASE ORAL at 08:09

## 2024-02-17 RX ADMIN — ACETAMINOPHEN 975 MG: 325 TABLET, FILM COATED ORAL at 06:20

## 2024-02-17 RX ADMIN — SPIRONOLACTONE 25 MG: 25 TABLET ORAL at 08:09

## 2024-02-17 RX ADMIN — ONDANSETRON 4 MG: 4 TABLET, ORALLY DISINTEGRATING ORAL at 02:36

## 2024-02-17 RX ADMIN — GABAPENTIN 1200 MG: 400 CAPSULE ORAL at 01:23

## 2024-02-17 RX ADMIN — LISINOPRIL 40 MG: 40 TABLET ORAL at 08:10

## 2024-02-17 RX ADMIN — ACETAMINOPHEN 975 MG: 325 TABLET, FILM COATED ORAL at 12:16

## 2024-02-17 RX ADMIN — HYDROMORPHONE HYDROCHLORIDE 0.4 MG: 0.2 INJECTION, SOLUTION INTRAMUSCULAR; INTRAVENOUS; SUBCUTANEOUS at 07:39

## 2024-02-17 RX ADMIN — AMLODIPINE BESYLATE 5 MG: 5 TABLET ORAL at 08:10

## 2024-02-17 RX ADMIN — HYDROMORPHONE HYDROCHLORIDE 0.4 MG: 0.2 INJECTION, SOLUTION INTRAMUSCULAR; INTRAVENOUS; SUBCUTANEOUS at 02:36

## 2024-02-17 RX ADMIN — LEVOTHYROXINE SODIUM 56 MCG: 0.11 TABLET ORAL at 09:59

## 2024-02-17 RX ADMIN — ATORVASTATIN CALCIUM 80 MG: 40 TABLET, FILM COATED ORAL at 01:24

## 2024-02-17 RX ADMIN — HYDROMORPHONE HYDROCHLORIDE 0.4 MG: 0.2 INJECTION, SOLUTION INTRAMUSCULAR; INTRAVENOUS; SUBCUTANEOUS at 12:17

## 2024-02-17 RX ADMIN — ACETAMINOPHEN 975 MG: 325 TABLET, FILM COATED ORAL at 01:25

## 2024-02-17 ASSESSMENT — ACTIVITIES OF DAILY LIVING (ADL)
ADLS_ACUITY_SCORE: 38
ADLS_ACUITY_SCORE: 37
ADLS_ACUITY_SCORE: 27

## 2024-02-17 NOTE — PHARMACY-ADMISSION MEDICATION HISTORY
Pharmacist Admission Medication History    Admission medication history is complete. The information provided in this note is only as accurate as the sources available at the time of the update.    Information Source(s): Patient and CareEverywhere/SureScripts via in-person    Pertinent Information: Patient states that she doesn't normally take Norco on a daily basis but has increased use recently due to new pain with injury. Patient stated that she takes amlodipine 5 mg daily.    Changes made to PTA medication list:  Added: ibuprofen  Deleted: methocarbamol  Changed: acetaminophen from as needed to scheduled, diclofenac gel, Norco, spironolactone from 50 mg daily to 25 mg two times daily, tizanidine from 0.5-1 tablet three times daily to 1 tablet at bedtime.    Allergies reviewed with patient and updates made in EHR: yes    Medication History Completed By: Becca Painter [facility] 2/16/2024 8:11 PM    Prior to Admission medications    Medication Sig Last Dose Taking? Auth Provider Long Term End Date   acetaminophen (TYLENOL) 500 MG tablet Take 1,000 mg by mouth 3 times daily 2/16/2024 at x1 Yes Reported, Patient     amLODIPine (NORVASC) 5 MG tablet Take 1 tablet (5 mg) by mouth daily 2/16/2024 at am Yes Matilde Quiñonez APRN CNP Yes    Ascorbic Acid (VITAMIN C) 500 MG CHEW Take 1 tablet by mouth daily 2/15/2024 at am Yes Reported, Patient     atorvastatin (LIPITOR) 80 MG tablet Take 1 tablet (80 mg) by mouth daily 2/15/2024 at pm Yes Matilde Quiñonez APRN CNP Yes    Cholecalciferol (VITAMIN D3) 50 MCG (2000 UT) CAPS TAKE 3 CAPSULES (6,000 UNITS) BY MOUTH DAILY. 2/15/2024 at am Yes Matilde Quiñonez APRN CNP     CRANBERRY EXTRACT PO Take 2 tablets by mouth daily 2/15/2024 at am Yes Reported, Patient     diclofenac (VOLTAREN) 1 % topical gel Apply topically daily as needed for moderate pain Apply to wrist prn at prn Yes Unknown, Entered By History     gabapentin (NEURONTIN) 300 MG capsule Take 4 capsules (1,200 mg)  by mouth 3 times daily 2/16/2024 at x1 Yes Matilde Quiñonez APRN CNP Yes    HEMP OIL OR EXTRACT OR OTHER CBD CANNABINOID, NOT MEDICAL CANNABIS, THC  Yes Reported, Patient     HYDROcodone-acetaminophen (NORCO) 5-325 MG tablet Take 1 tab for breakthrough pain 1-2 times a week.  Patient taking differently: Take 1-2 tablets by mouth daily as needed for breakthrough pain 2/16/2024 at am Yes Matilde Quiñonez APRN CNP No    ibuprofen (ADVIL/MOTRIN) 200 MG tablet Take 600-800 mg by mouth daily as needed for pain prn at prn Yes Unknown, Entered By History No    levothyroxine (SYNTHROID/LEVOTHROID) 112 MCG tablet TAKE 1/2 TAB BY MOUTH EVERY DAY 2/16/2024 at am Yes Matilde Quiñonez APRN CNP Yes    lisinopril (ZESTRIL) 40 MG tablet Take 1 tablet (40 mg) by mouth daily 2/16/2024 at am Yes Matilde Quiñonez APRN CNP Yes    magnesium 250 MG tablet Take 1 tablet by mouth every evening 2/15/2024 at pm Yes Reported, Patient     Melatonin 10 MG TABS tablet Take 10 mg by mouth at bedtime 2/15/2024 at pm Yes Reported, Patient     metoprolol succinate ER (TOPROL XL) 50 MG 24 hr tablet Take 1 tablet (50 mg) by mouth daily 2/16/2024 at am Yes Radha Rolle MD Yes    Multiple Vitamin (MULTI-VITAMIN) per tablet Take 1 tablet by mouth daily 2/15/2024 at am Yes Reported, Patient     spironolactone (ALDACTONE) 25 MG tablet Take 25 mg by mouth 2 times daily 2/16/2024 at x2 Yes Unknown, Entered By History No    tiZANidine (ZANAFLEX) 4 MG tablet Take 4 mg by mouth at bedtime 2/15/2024 at pm Yes Unknown, Entered By History No    Vaginal Lubricant (REPHRESH) GEL Place 2 g vaginally every 3 days 2/14/2024 Yes Matilde Quiñonez APRN CNP     naloxone (NARCAN) 4 MG/0.1ML nasal spray Spray 1 spray (4 mg) into one nostril alternating nostrils once as needed for opioid reversal every 2-3 minutes until assistance arrives never filled  Jemal Sanchez MD Yes    ondansetron (ZOFRAN-ODT) 4 MG ODT tab Take 1 tablet (4 mg) by mouth every 12 hours  as needed for nausea prn at prn  Matilde Quiñonez, APRN CNP

## 2024-02-17 NOTE — DISCHARGE SUMMARY
[facility]  Discharge Summary  Hospitalist      Date of Admission:  2/16/2024  Date of Discharge:  2/17/2024  Provider:  Lisa Ramsey MD  Date of Service (when I last saw the patient): 02/17/24      Primary Provider: Matilde Quiñonez          Discharge Diagnosis:     Discharge Diagnoses   New cardiomyopathy with reduced EF of 35%  Elevated troponin level, suspect demand ischemia  S/p fall  Acute mildly displaced fracture of inferior sternal body    Other medical issues:  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    Hospital Course     Nadira Perez was admitted on 2/16/2024.  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      ED workup including EKG shows rate of 89 bpm in SR with RBBB, inferior infarct age undetermined, CT of chest shows acute mildly displaced fracture of the inferior sternal body with mild  surrounding inflammatory stranding, no soft tissue or mediastinal hematoma, and echocardiogram shows new reduced EF of 35%, RV is normal in structure, function, and size, aortic root dilatation, fractured sternum, fat pad versus small effusion, moderate to severe global hypokinesis of LV.  Cardiology was consulted and recommended outpatient follow-up with cardiac MRI.  Patient discharged home on pain medications.  Patient is on monoclonal antibody Evenity she is advised to check with the prescriber/provider to see if she should continue taking this in setting of abnormal EKG and new heart failure.  She is advised to take metoprolol spironolactone lisinopril and statins as before.  Patient to call or come if there are new or worsening symptoms she understands and agrees with plan.  On discharge her vitals are stable.  Pain is well-controlled with narcotic pain meds.  She will follow-up with primary care provider for further pain management.        Significant Results and Procedures   As noted above    Pending Results   Unresulted Labs Ordered in the Past 30 Days of this Admission       No orders found for last 31 day(s).            Code Status   Full Code       Primary Care Physician   Matilde Quiñonez    Physical Exam   Temp: 98.5  F (36.9  C) Temp src: Oral BP: 134/80 Pulse: 91   Resp: 16 SpO2: 95 % O2 Device: None (Room air)    Vitals:    02/16/24 2233 02/17/24 0449 02/17/24 1000   Weight: 85.7 kg (189 lb) 85.8 kg (189 lb 2.5 oz) 85.8 kg (189 lb 1.6 oz)     Vital Signs with Ranges  Temp:  [98.5  F (36.9  C)-99.5  F (37.5  C)] 98.5  F (36.9  C)  Pulse:  [] 91  Resp:  [8-29] 16  BP: (134-166)/() 134/80  SpO2:  [95 %-98 %] 95 %  No intake/output data recorded.    Constitutional: Awake, alert, cooperative, no apparent distress, and appears stated age.  Respiratory: Poor inspiratory effort otherwise no increased work of breathing, good air exchange, clear to auscultation bilaterally, no crackles or  wheezing.  Cardiovascular: Normal apical impulse,normal S1 and S2,   GI: Normal bowel sounds, soft, non-distended.      Discharge Disposition   Discharged to home    Consultations This Hospital Stay   CARDIOLOGY IP CONSULT    Time Spent on this Encounter   I, Lisa Ramsey MD, personally saw the patient today and spent greater than 30 minutes discharging this patient.    Discharge Orders      Follow-Up with Cardiology      Reason for your hospital stay    Discharge summary briefly admitted for new diagnosis of congestive heart failure and sternal fracture.     Follow-up and recommended labs and tests     Follow up with primary care provider, Matilde Quiñonez, within 7 days for hospital follow- up.  No follow up labs or test are needed.  But will need follow-up for pain control and pain management    Follow-up with cardiology as scheduled    Noted that you are on   romosozumab-aqqg (EVENITY)    Discussed with prescriber about your new diagnosis of abnormal EKG and congestive heart failure before resuming monthly injections     Activity    Your activity upon discharge: activity as tolerated     Diet    Follow this diet upon discharge: Orders Placed This Encounter      Low Saturated Fat Na <2400 mg     MRI Cardiac w/contrast and stress     Discharge Medications   Current Discharge Medication List        START taking these medications    Details   !! HYDROcodone-acetaminophen (NORCO) 5-325 MG tablet Take 1 tablet by mouth every 6 hours as needed for severe pain  Qty: 10 tablet, Refills: 0    Associated Diagnoses: Closed fracture of sternum, unspecified portion of sternum, initial encounter       !! - Potential duplicate medications found. Please discuss with provider.        CONTINUE these medications which have NOT CHANGED    Details   acetaminophen (TYLENOL) 500 MG tablet Take 1,000 mg by mouth 3 times daily      amLODIPine (NORVASC) 5 MG tablet Take 1 tablet (5 mg) by mouth daily  Qty: 90 tablet, Refills: 3     Associated Diagnoses: Benign essential hypertension      Ascorbic Acid (VITAMIN C) 500 MG CHEW Take 1 tablet by mouth daily      atorvastatin (LIPITOR) 80 MG tablet Take 1 tablet (80 mg) by mouth daily  Qty: 90 tablet, Refills: 3    Associated Diagnoses: Pure hypercholesterolemia      Cholecalciferol (VITAMIN D3) 50 MCG (2000 UT) CAPS TAKE 3 CAPSULES (6,000 UNITS) BY MOUTH DAILY.  Qty: 270 capsule, Refills: 3    Associated Diagnoses: Hypovitaminosis D      CRANBERRY EXTRACT PO Take 2 tablets by mouth daily      diclofenac (VOLTAREN) 1 % topical gel Apply topically daily as needed for moderate pain Apply to wrist      gabapentin (NEURONTIN) 300 MG capsule Take 4 capsules (1,200 mg) by mouth 3 times daily  Qty: 1080 capsule, Refills: 3    Associated Diagnoses: Neuropathic pain      HEMP OIL OR EXTRACT OR OTHER CBD CANNABINOID, NOT MEDICAL CANNABIS, THC      !! HYDROcodone-acetaminophen (NORCO) 5-325 MG tablet Take 1 tab for breakthrough pain 1-2 times a week.  Qty: 30 tablet, Refills: 0    Associated Diagnoses: Spinal stenosis of lumbar region with neurogenic claudication; DDD (degenerative disc disease), lumbar      levothyroxine (SYNTHROID/LEVOTHROID) 112 MCG tablet TAKE 1/2 TAB BY MOUTH EVERY DAY  Qty: 45 tablet, Refills: 3    Associated Diagnoses: Hypothyroidism, unspecified type      lisinopril (ZESTRIL) 40 MG tablet Take 1 tablet (40 mg) by mouth daily  Qty: 90 tablet, Refills: 3    Associated Diagnoses: Benign essential hypertension      magnesium 250 MG tablet Take 1 tablet by mouth every evening      Melatonin 10 MG TABS tablet Take 10 mg by mouth at bedtime      metoprolol succinate ER (TOPROL XL) 50 MG 24 hr tablet Take 1 tablet (50 mg) by mouth daily  Qty: 90 tablet, Refills: 3    Associated Diagnoses: At risk for cardiomyopathy; Essential hypertension, benign      Multiple Vitamin (MULTI-VITAMIN) per tablet Take 1 tablet by mouth daily      spironolactone (ALDACTONE) 25 MG tablet Take 25 mg by mouth 2  times daily      tiZANidine (ZANAFLEX) 4 MG tablet Take 4 mg by mouth at bedtime      naloxone (NARCAN) 4 MG/0.1ML nasal spray Spray 1 spray (4 mg) into one nostril alternating nostrils once as needed for opioid reversal every 2-3 minutes until assistance arrives  Qty: 0.2 mL, Refills: 0    Associated Diagnoses: Dermatochalasis of both upper eyelids      ondansetron (ZOFRAN-ODT) 4 MG ODT tab Take 1 tablet (4 mg) by mouth every 12 hours as needed for nausea  Qty: 180 tablet, Refills: 3    Associated Diagnoses: Vertigo       !! - Potential duplicate medications found. Please discuss with provider.        STOP taking these medications       ibuprofen (ADVIL/MOTRIN) 200 MG tablet Comments:   Reason for Stopping:         Vaginal Lubricant (REPHRESH) GEL Comments:   Reason for Stopping:             Allergies   Allergies   Allergen Reactions    Erythromycin Nausea    Penicillins Hives     Around age 6 - doesn't recall full reaction, mother told her it was hives.     Has tolerated cephalsporins.      Data   Most Recent 3 CBC's:  Recent Labs   Lab Test 02/17/24  0640 02/16/24  1005 11/30/23  1218   WBC 10.3 9.8 8.7   HGB 11.9 12.3 12.6   MCV 91 93 88    306 301      Most Recent 3 BMP's:  Recent Labs   Lab Test 02/17/24  0640 02/16/24  1022 02/16/24  1005 12/19/23  1701     --  142 140   POTASSIUM 4.5  --  4.0 4.1   CHLORIDE 103  --  105 105   CO2 25  --  24 23   BUN 24.1*  --  30.3* 17.8   CR 0.78 1.1* 0.93 0.86   ANIONGAP 12  --  13 12   RAMBO 9.3  --  10.1 10.0   *  --  106* 134*     Most Recent 2 LFT's:  Recent Labs   Lab Test 11/30/23  1218 10/27/22  1428 01/12/22  1034   AST 25 22 16   ALT 21 16 24   ALKPHOS 91 96 114   BILITOTAL  --  0.2 0.4     Most Recent INR's and Anticoagulation Dosing History:  Anticoagulation Dose History          Latest Ref Rng & Units 7/5/2007 11/16/2007 4/24/2008 9/30/2008 8/20/2010   Recent Dosing and Labs   INR 0.86 - 1.14 0.90  0.98  0.93  0.91  0.85      Most Recent 3  Troponin's:No lab results found.  Most Recent Cholesterol Panel:  Recent Labs   Lab Test 04/15/22  1632   CHOL 167   LDL 71   HDL 67   TRIG 145     Most Recent 6 Bacteria Isolates From Any Culture (See EPIC Reports for Culture Details):No lab results found.  Most Recent TSH, T4 and A1c Labs:  Recent Labs   Lab Test 12/04/23  1642 11/30/23  1218   TSH 2.03 2.07   A1C  --  6.5*     Results for orders placed or performed during the hospital encounter of 02/16/24   Chest CT w IV contrast only, TRAUMA / DISSECTION    Narrative    CT CHEST WITH CONTRAST 2/16/2024 11:51 AM     HISTORY: blunt force chest injury with confirmed sternal fracture    COMPARISON: Chest radiograph 2/15/2024. CT chest 1/25/2022.    TECHNIQUE: Volumetric helical acquisition of CT images of the chest  from the clavicles to the kidneys were acquired after the  administration of 94 mL Isovue-370 IV contrast. Radiation dose for  this scan was reduced using automated exposure control, adjustment of  the mA and/or kV according to patient size, or iterative  reconstruction technique.    FINDINGS:     LUNGS AND PLEURA: Mild subsegmental atelectasis/scarring of the lung  bases. Stable subcentimeter pulmonary nodules including a 4 mm nodule  in the right upper lobe (series 4, image 63) and 2 mm nodule along the  right major fissure (series 4, image 146). No pleural effusion or  pneumothorax.    MEDIASTINUM/AXILLAE: No evidence for mediastinal hematoma. Normal  heart size without pericardial effusion. The contour of the thoracic  aorta is within normal limits. Mild ectasia of the ascending thoracic  aorta measuring up to 4 cm in diameter, similar. No central pulmonary  embolism.    CORONARY ARTERY CALCIFICATIONS: Moderate.    UPPER ABDOMEN: Unchanged left adrenal nodule, previously characterized  as an adrenal adenoma. Nonobstructing punctate calculus of the left  upper pole kidney. Renal cysts as previously characterized by  ultrasound. Parenchymal thinning  of the right interpolar kidney.    MUSCULOSKELETAL: Acute mildly displaced fracture of the inferior  sternal body with mild surrounding inflammatory stranding without  defined soft tissue hematoma. Remote compression fracture T11 with  similar height loss and bony retropulsion of the superior endplate.  Remote fracture of the left L2 pedicle.      Impression    IMPRESSION:  1.  Acute mildly displaced fracture of the inferior sternal body with  mild surrounding inflammatory stranding. No defined soft tissue or  mediastinal hematoma.  2.  No additional acute findings within the chest.  3.  Unchanged chronic findings as above.    ERVIN PEREZ MD         SYSTEM ID:  [id number]   Echocardiogram Complete     Value    LVEF  35%    Narrative    [id number]  [id number]  [id number]  [id number]^GRIFFIN^SERGIO     [facility]  Echocardiography Laboratory  [address] [place] [address]     Name: DWAYNE STILES  MRN: [id number]  : 1950  Study Date: 2024 03:31 PM  Age: 73 yrs  Gender: Female  Patient Location: [facility]  Reason For Study: Pericardial Effusion  Ordering Physician: SERGIO MOYA  Referring Physician: Matilde Quiñonez  Performed By: Molly Morgan     BSA: 1.9 m2  Height: 66 in  Weight: 187 lb  HR: 77  BP: 156/100 mmHg  ______________________________________________________________________________  Procedure  Complete Portable Echo Adult. Optison (NDC #5742-8009) given intravenously.  Poor acoustic windows.  ______________________________________________________________________________  Interpretation Summary     The left ventricle is mildly dilated.  The visual ejection fraction is estimated at 35%.  The right ventricle is normal in structure, function and size.  Aortic root dilatation is present.  TDS- fractured sternum. Fat pad vs. effusion(small)  Clinical correlation required The study was technically difficult. Compared to  prior study, changes are  noted.  ______________________________________________________________________________  Left Ventricle  The left ventricle is mildly dilated. There is normal left ventricular wall  thickness. The visual ejection fraction is estimated at 35%. There is mod-  severe global hypokinesia of the left ventricle.     Right Ventricle  The right ventricle is normal in structure, function and size.     Atria  Normal left atrial size. Right atrial size is normal. There is no atrial shunt  seen.     Mitral Valve  The mitral valve is normal in structure and function. There is trace mitral  regurgitation.     Tricuspid Valve  The tricuspid valve is normal in structure and function. There is trace  tricuspid regurgitation. Right ventricular systolic pressure could not be  approximated due to inadequate tricuspid regurgitation.     Aortic Valve  There is mild trileaflet aortic sclerosis. No aortic regurgitation is present.     Pulmonic Valve  The pulmonic valve is not well seen, but is grossly normal.     Vessels  Aortic root dilatation is present. Boerline dilation of ascending aorta.     Pericardium  There is no pericardial effusion.     Rhythm  Sinus rhythm was noted.  ______________________________________________________________________________  MMode/2D Measurements & Calculations  IVSd: 1.1 cm     LVIDd: 5.6 cm  LVIDs: 4.1 cm  LVPWd: 0.93 cm  FS: 27.4 %  LV mass(C)d: 227.8 grams  LV mass(C)dI: 117.2 grams/m2  Ao root diam: 4.0 cm  asc Aorta Diam: 3.7 cm  LVOT diam: 2.0 cm  LVOT area: 3.2 cm2  Ao root diam index Ht(cm/m): 2.4  Ao root diam index BSA (cm/m2): 2.1  Asc Ao diam index BSA (cm/m2): 1.9  Asc Ao diam index Ht(cm/m): 2.2  LA Volume (BP): 39.7 ml     LA Volume Index (BP): 20.5 ml/m2  RWT: 0.33  TAPSE: 3.0 cm     Doppler Measurements & Calculations  MV E max jada: 60.8 cm/sec  MV A max jada: 99.0 cm/sec  MV E/A: 0.61  MV max P.1 mmHg  MV mean P.7 mmHg  MV V2 VTI: 20.0 cm  MVA(VTI): 3.7 cm2  MV dec time: 0.16  sec  Ao V2 max: 142.0 cm/sec  Ao max P.0 mmHg  Ao V2 mean: 95.5 cm/sec  Ao mean P.0 mmHg  Ao V2 VTI: 27.7 cm  DANIEL(I,D): 2.7 cm2  DANIEL(V,D): 2.6 cm2  LV V1 max P.4 mmHg  LV V1 max: 116.0 cm/sec  LV V1 VTI: 23.4 cm  SV(LVOT): 74.6 ml  SI(LVOT): 38.4 ml/m2  PA V2 max: 107.8 cm/sec  PA max P.6 mmHg  PA acc time: 0.13 sec  AV Jose Ratio (DI): 0.82  DANIEL Index (cm2/m2): 1.4  E/E' av.4     Lateral E/e': 5.5  Medial E/e': 11.2  RV S Jose: 14.5 cm/sec     ______________________________________________________________________________  Report approved by: Ngoc Garcia 2024 04:28 PM           *Note: Due to a large number of results and/or encounters for the requested time period, some results have not been displayed. A complete set of results can be found in Results Review.           Disclaimer: This note consists of symbols derived from keyboarding, dictation and/or voice recognition software. As a result, there may be errors in the script that have gone undetected. Please consider this when interpreting information found in this chart.

## 2024-02-17 NOTE — ED PROVIDER NOTES
Patient signed out to me by my colleague, Dr. Jorge.  In brief, the patient is a 73 old female who had a fall 4 days ago striking her sternum.  She has a minimally displaced dental fracture, troponin elevation that is downtrending but still outside reference range on repeat, new lateral precordial T wave inversions with pre-existing right bundle branch block.  At the time of signout, stat echocardiogram showed newly reduced LVEF, questionable small pericardial effusion, normal RV function.  Plan is for admission to the hospital service for further care.    I discussed the patient with the hospitalist and will speak with the cardiothoracic surgeon on-call given the sternal fracture and concern for new cardiac dysfunction.  I discussed the patient with Dr. Toney of cardiothoracic surgery and he recommends cardiology consultation for the newly reduced LVEF, pain control for the sternal fracture, no other specific cardiothoracic surgery recommendations for now.  Patient was admitted to the hospitalist service for further care.     Justus Albrecht MD  02/16/24 [id number]

## 2024-02-17 NOTE — H&P
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     ED workup reveals: hypertensive otherwise VSS, CBC unremarkable, BUN of 30.3, glucose of 106, initial troponin of 31 with repeat of 26, EKG shows rate of 89 bpm in SR with RBBB, inferior infarct age undetermined, CT of chest shows acute mildly displaced fracture of the inferior sternal body with mild surrounding inflammatory stranding, no soft tissue or mediastinal hematoma, and echocardiogram shows new reduced EF of 35%, RV is normal in structure, function, and size, aortic root dilatation, fractured sternum, fat pad versus small effusion, moderate to severe global hypokinesis of LV.     #New cardiomyopathy with reduced EF of 35%  #Elevated troponin level, suspect demand ischemia: bedside ultrasound obtained by ED provider with concern for RV motion irregularity and with known sternal fracture a formal echocardiogram was obtained showing reduced EF of 35%, RV is normal in structure, function, and size, aortic root dilatation, fractured sternum, fat pad versus small effusion, moderate to severe global hypokinesis of LV. Oddly enough it is unclear if this is related to the patient's recent fall causing a stress cardiomyopathy or if this is a separate incidental finding. EKG without acute ischemic changes. Troponin  detectable at 31 with repeat of 26, which would support mild demand ischemia. Previously seen by cardiology in 12/2023 and noted on EKG for previous inferior infarct with no known history of CAD or previous stress test/coronary angiogram.   -monitor on telemetry  -cardiology consult to assist with management   -continue PTA Metoprolol and Lisinopril  -does not appear hypervolemic on exam, would hold off on diuresis at this point     #S/p fall  #Acute mildly displaced fracture of inferior sternal body: sounds like patient had a mechanical fall when not yet wearing her right AFO on 2/12 and walked into her den at her home and has issues at baseline with balance and sensation due to peripheral neuropathy. Patient lost her balance and fell into the corner of a wood cabinet with onset of sternal pain after. CXR per provider on 2/15 showed sternal fracture as cause of pain. CT of chest on 2/16 shows acute mildly displaced fracture of inferior sternal body with mild surrounding inflammatory stranding. No significant ecchymosis on exam. Issue with intractable pain since injury. Had been using PTA Norco about every 6 hours (prescribed for chronic back pain) and currently out of prescription.   -sternal precautions, no heavy lifting, twisting or straining  -ED provider discussed fracture and echo results with cardiothoracic surgeon, Dr. Toney, with no specific guidelines except cardiology evaluation for above, pain control, and consider plating of sternum if pain continues to be an issue in the future  -no current need for cardiothoracic surgery or trauma consult, consider outpatient follow up if pain intractable   -fall precautions   -pain control with scheduled Tylenol, PRN Voltaren gel, Atarax, Tizanidine, Oxycodone, and IV Dilaudid available for severe pain     #HTN: BP currently elevated, suspect due to pain  -continue PTA Amlodipine, Lisinopril, Metoprolol, and Spironolactone with parameters    #Symptomatic PVCs:  "known since 2010, continue PTA Metoprolol    #Hypothyroidism: continue PTA Levothyroxine    #Diet controlled DM2: most recent HgbA1c of 6.5 with initial blood glucose of 106    #Lumbar spinal stenosis and DDD  #S/p L5-S1 lumbar fusion with chronic right foot drop  #Chronic pain syndrome  #Peripheral neuropathy  -wears right AFO when ambulating   -baseline balance issues from peripheral neuropathy  -continue PTA Gabapentin  -hold PTA Norco while receiving above pain regimen (reports currently out of prescription due to treating acute pain)     Clinically Significant Risk Factors Present on Admission                  # Hypertension: Noted on problem list  # Chronic heart failure with reduced ejection fraction: last echo with EF <40%    # DMII: A1C = 6.5 % (Ref range: <5.7 %) within past 6 months    # Obesity: Estimated body mass index is 30.44 kg/m  as calculated from the following:    Height as of 2/15/24: 1.676 m (5' 5.98\").    Weight as of 2/15/24: 85.5 kg (188 lb 8 oz).            DVT Prophylaxis: Pneumatic Compression Devices  Code Status: Full Code, discussed with patient   Disposition: Expected stay >2 midnights, will admit to inpatient     Primary Care Physician   Matilde Quiñonez    Chief Complaint   S/p fall, sternal fracture     History obtained from discussion with ED provider, Dr. Albrecht, chart review, and interview with patient.     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  The patient states on 2/12 she had not put on her right AFO brace and was walking into her den with her right foot dragging behind her. She states she has peripheral neuropathy and cannot always feel the location of her feet. She ended up falling onto her wooden file cabinet hitting the center of her sternum. She states that she also hit both of her knees resulting in left greater than right knee pain.  Ever since she hit her sternum she has " had central chest pain that is worse with attempting to take a deep breath.  Denies loss of consciousness or hitting her head with her fall.  The patient is prescribed approximately 10 tablets of Norco monthly for management of her chronic back pain for which she has been taking about every 6 hours for the last 2 days to make her pain tolerable. She is currently out of her Norco prescription.  Yesterday she was seen by PMR provider and requested a chest x-ray which confirmed a sternal fracture as the cause of her pain.  She notes nausea over the last day without vomiting.  She has a known hiatal hernia and reports recent increased acid reflux and burping sensation.  She was instructed to come into the ED for the sternal fracture that was found on her x-ray imaging but waited until this morning to come in.  Denies any left-sided chest pain since her fall.  Denies diaphoresis, lightheadedness, or dizziness of recent.  She has baseline lower extremity swelling that is not any worse of recent.  She notes a previous EKG showing concern for an inferior MI diagnosed on 12/23 for which she has seen cardiology for.  She does not believe she has previously undergone stress testing or coronary angiogram.  Denies otherwise personal history of CAD.  Denies fever, chills, abdominal pain, numbness/tingling, orthopnea, or PND.  Denies significant ecchymosis from her fall.    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    Thyroid disease       Past Surgical History   Past Surgical History:   Procedure Laterality Date    ABDOMEN SURGERY      ovarian cyst, mesh    ARTHRODESIS WRIST Right     ARTHRODESIS WRIST  02/14/2013    Procedure: ARTHRODESIS WRIST;  left wrist scaphoid excision, four bone fusion, iliac crest bone graft  ( Mac with block);  Surgeon: Av Mendez MD;  [facility]: [facility]    BIOPSY      skin, vaginal    BLEPHAROPLASTY BILATERAL Bilateral 05/06/2022    Procedure: UPPER BLEPHAROPLASTY, BILATERAL;  Surgeon: Jemal Sanchez MD;  [place] [facility] [place]    CATARACT IOL, RT/LT Right 03/13/2018    CATARACT IOL, RT/LT Left 02/20/2018    COLONOSCOPY  12/24/2013    Procedure: COMBINED COLONOSCOPY, SINGLE BIOPSY/POLYPECTOMY BY BIOPSY;  COLONOSCOPY;  Surgeon: Dom Alvarez MD;  Location:  GI    COSMETIC BLEPHAROPLASTY LOWER LIDS BILATERAL Bilateral 05/06/2022    Procedure: BLEPHAROPLASTY, LOWER EYELID, BILATERAL, COSMETIC;  Surgeon: Jemal Sanchez MD;  [place] [facility] [place]    COSMETIC SURGERY      ESOPHAGOSCOPY, GASTROSCOPY, DUODENOSCOPY (EGD), COMBINED N/A 11/23/2016    Procedure: COMBINED ESOPHAGOSCOPY, GASTROSCOPY, DUODENOSCOPY (EGD);  Surgeon: Quinten Feliciano MD;  Location:  GI    EXTERNAL EAR SURGERY      right    EYE SURGERY      radial keratomy    FUSION, SPINE, INTERBODY, OBLIQUE ANTERIOR AND LUMBAR, 2 LEVELS, POST APPROACH, USING OTS N/A 11/18/2021    Procedure: Part 1: Oblique Anterior Interbody Fusion at Lumbar 5 to sacral 1 with use of Bone Morphogenic Protein,;  Surgeon: Serge Ramirez MD;  Location:  OR    GI SURGERY      Umbilical hernia repair    GRAFT BONE FROM ILIAC CREST  02/14/2013    Procedure: GRAFT BONE FROM ILIAC CREST;  mac with block and local infilitration;  Surgeon: Av Mendez MD;  Location:  OR    HC BREATH HYDROGEN TEST N/A 10/14/2016    Procedure: HYDROGEN BREATH TEST;  Surgeon: Cheri Barron MD;  Location:  GI    HERNIA REPAIR      umbilical age 18 mos.     HYSTERECTOMY TOTAL ABDOMINAL  05/03/2000    INJECT EPIDURAL CAUDAL N/A 09/12/2023    Procedure: Caudal epidural steroid injection with fluoroscopy;  Surgeon: Natasha Umaña MD;  Location: [facility] OR    INJECT EPIDURAL CAUDAL N/A 1/2/2024    Procedure: INJECTION, EPIDURAL, CAUDAL;  Surgeon: Natasha Umaña MD;  Location: [facility] OR    INJECT EPIDURAL LUMBAR N/A 05/23/2023    Procedure: L3-4 interlaminar epidural steroid injection with fluoroscopy ( left> right);  Surgeon: Natasha Umaña MD;  Location: [facility] OR    INJECT JOINT SACROILIAC Left 04/27/2023    Procedure: Left sacroiliac joint steroid injection with fluoroscopy;  Surgeon: Natasha Umaña MD;  Location: [facility] OR    MASTECTOMY MODIFIED RADICAL Bilateral     bilateral; right breast prophylactic    OPTICAL TRACKING SYSTEM FUSION POSTERIOR SPINE LUMBAR N/A 11/18/2021    Procedure: Open Posterior Instrumented Spinal Fusion at Lumbar 5 to Sacral 1, with grimm aguayo osteotomy, use of O-Arm/Stealth;  Surgeon: Serge Ramirez MD;  Location: [facility]    PARATHYROIDECTOMY  09/23/2004    R SUPERIOR    PARATHYROIDECTOMY  09/23/2004    parathyroid resection, subtotal    RELEASE CARPAL TUNNEL Right 12/02/2021    Procedure: RIGHT CARPAL TUNNEL RELEASE, RIGHT WRIST HARDWARE REMOVAL, RIGHT CARPAL BOSS EXCISION;  Surgeon: Rolan Conley MD;  Location: [facility] OR    REMOVE HARDWARE HAND  09/24/2013    Procedure: REMOVE HARDWARE HAND;  Left Hand Screw Removal       RHINOPLASTY  1968    thyr proc skin closed cosmetic manner by subcuticular stitch  01/23/2009    THYROPLASTY  10/09/2009    TONSILLECTOMY  1977    VITRECTOMY PARSPLANA WITH 25 GAUGE SYSTEM Left 06/20/2022    Procedure: LEFT EYE VITRECTOMY, PARS PLANA APPROACH, USING 25-GAUGE INSTRUMENTS, laser;  Surgeon: Felix Carlos MD;  Location: [facility] OR    WRIST SURGERY      wrist arthrodesis     Prior to Admission Medications   Prior to Admission Medications   Prescriptions Last Dose Informant Patient Reported? Taking?    Ascorbic Acid (VITAMIN C) 500 MG CHEW   Yes No   Sig: Take 1 tablet by mouth daily   CRANBERRY EXTRACT PO   Yes No   Sig: Take 650 mg by mouth daily ~10 am  Takes 1   Cholecalciferol (VITAMIN D3) 50 MCG (2000 UT) CAPS   No No   Sig: TAKE 3 CAPSULES (6,000 UNITS) BY MOUTH DAILY.   HEMP OIL OR EXTRACT OR OTHER CBD CANNABINOID, NOT MEDICAL CANNABIS,   Yes No   Sig: THC   HYDROcodone-acetaminophen (NORCO) 5-325 MG tablet   No No   Sig: Take 1 tab for breakthrough pain 1-2 times a week.   Multiple Vitamin (MULTI-VITAMIN) per tablet   Yes No   Sig: Take 1 tablet by mouth daily   Vaginal Lubricant (REPHRESH) GEL   No No   Sig: Place 2 g vaginally every 3 days   acetaminophen (TYLENOL) 500 MG tablet   Yes No   Sig: Take 500-1,000 mg by mouth every 6 hours as needed for mild pain   amLODIPine (NORVASC) 5 MG tablet   No No   Sig: Take 1 tablet (5 mg) by mouth daily   atorvastatin (LIPITOR) 80 MG tablet   No No   Sig: Take 1 tablet (80 mg) by mouth daily   diclofenac (VOLTAREN) 1 % topical gel   No No   Sig: Apply to toe or other joints painful   gabapentin (NEURONTIN) 300 MG capsule   No No   Sig: Take 4 capsules (1,200 mg) by mouth 3 times daily   levothyroxine (SYNTHROID/LEVOTHROID) 112 MCG tablet   No No   Sig: TAKE 1/2 TAB BY MOUTH EVERY DAY   lisinopril (ZESTRIL) 40 MG tablet   No No   Sig: Take 1 tablet (40 mg) by mouth daily   magnesium 250 MG tablet   Yes No   Sig: Take 1 tablet by mouth daily   melatonin 5 MG tablet   Yes No   Sig: Take 10 mg by mouth At Bedtime    methocarbamol (ROBAXIN) 500 MG tablet   No No   Sig: Take 1 tablet (500 mg) by mouth 3 times daily   metoprolol succinate ER (TOPROL XL) 50 MG 24 hr tablet   No No   Sig: Take 1 tablet (50 mg) by mouth daily   naloxone (NARCAN) 4 MG/0.1ML nasal spray   No No   Sig: Spray 1 spray (4 mg) into one nostril alternating nostrils once as needed for opioid reversal every 2-3 minutes until assistance arrives   ondansetron (ZOFRAN-ODT) 4 MG ODT tab   No No   Sig:  Take 1 tablet (4 mg) by mouth every 12 hours as needed for nausea   spironolactone (ALDACTONE) 25 MG tablet   No No   Sig: Take 2 tablets (50 mg) by mouth daily   tiZANidine (ZANAFLEX) 4 MG tablet   No No   Sig: TAKE 0.5-1 TABLET BY MOUTH 3 TIMES DAILY      Facility-Administered Medications Last Administration Doses Remaining   dexamethasone (DECADRON) injection 1 mL 8/9/2022 12:17 PM    dexamethasone (DECADRON) injection 1 mL 2/14/2023  2:45 PM    lidocaine 1 % injection 1 mL 11/14/2023 11:20 AM    lidocaine 1 % injection 1 mL 11/14/2023 11:20 AM    romosozumab-aqqg (EVENITY) injection 210 mg 1/4/2023  1:07 PM    romosozumab-aqqg (EVENITY) injection 210 mg 4/12/2023  1:07 PM    triamcinolone (KENALOG-40) injection 40 mg 2/14/2023  2:47 PM    triamcinolone (KENALOG-40) injection 40 mg 2/14/2023  2:47 PM    triamcinolone (KENALOG-40) injection 40 mg 7/11/2023 10:05 AM    triamcinolone (KENALOG-40) injection 40 mg 11/14/2023 11:20 AM    triamcinolone (KENALOG-40) injection 40 mg 11/14/2023 11:20 AM         Allergies   Allergies   Allergen Reactions    Erythromycin Nausea    Penicillins Hives     Around age 6 - doesn't recall full reaction, mother told her it was hives.     Has tolerated cephalsporins.      Social History   Social History     Tobacco Use    Smoking status: Never     Passive exposure: Never    Smokeless tobacco: Never   Substance Use Topics    Alcohol use: Not Currently     Alcohol/week: 7.0 standard drinks of alcohol     Types: 7 Glasses of wine per week     Comment: Rare if ever     Social History     Social History Narrative    .  Recently retired. 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        She lives with a renter.         She exercises 50 minutes three times a week.     Family History   Family history reviewed with patient and father with MI.     Review of Systems   A Comprehensive greater than 10 system review  of systems was carried out.  Pertinent positives and negatives are noted above.  Otherwise negative for contributory information.    Physical Exam   Temp: 98.8  F (37.1  C) Temp src: Temporal BP: (!) 152/102 Pulse: 89   Resp: 17 SpO2: 97 % O2 Device: None (Room air)    Vital Signs with Ranges  Temp:  [98.8  F (37.1  C)] 98.8  F (37.1  C)  Pulse:  [70-96] 89  Resp:  [8-29] 17  BP: (138-157)/() 152/102  SpO2:  [94 %-98 %] 97 %  0 lbs 0 oz    GEN:  Alert, oriented x 3, appears mildly uncomfortable sitting on edge of gurney, no overt distress  HEENT:  Normocephalic/atraumatic, no scleral icterus, no nasal discharge, mouth moist.  CV:  Regular rate and rhythm, no murmur or JVD.  S1 + S2 noted, no S3 or S4.  Chest wall: tender over inferior sternum, mild ecchymosis  LUNGS:  Clear to auscultation bilaterally without rales/rhonchi/wheezing/crackles. Symmetric chest rise on inhalation noted.  ABD:  Active bowel sounds, soft, non-tender/non-distended.  No rebound/guarding/rigidity.  EXT: 1+ bilateral LE edema. No cyanosis.  No acute joint synovitis noted.  SKIN:  Dry to touch, no exanthems noted in the visualized areas.  NEURO:  Symmetric muscle strength, sensation to touch grossly intact.  Coordination symmetric on general exam.  No new focal deficits appreciated.    Data   Data reviewed today:  I personally reviewed EKG with rate of 89 bpm in SR with RBBB, inferior infarct age undetermined.    Results for orders placed or performed during the hospital encounter of 02/16/24   Chest CT w IV contrast only, TRAUMA / DISSECTION     Status: None    Narrative    CT CHEST WITH CONTRAST 2/16/2024 11:51 AM     HISTORY: blunt force chest injury with confirmed sternal fracture    COMPARISON: Chest radiograph 2/15/2024. CT chest 1/25/2022.    TECHNIQUE: Volumetric helical acquisition of CT images of the chest  from the clavicles to the kidneys were acquired after the  administration of 94 mL Isovue-370 IV contrast. Radiation dose  for  this scan was reduced using automated exposure control, adjustment of  the mA and/or kV according to patient size, or iterative  reconstruction technique.    FINDINGS:     LUNGS AND PLEURA: Mild subsegmental atelectasis/scarring of the lung  bases. Stable subcentimeter pulmonary nodules including a 4 mm nodule  in the right upper lobe (series 4, image 63) and 2 mm nodule along the  right major fissure (series 4, image 146). No pleural effusion or  pneumothorax.    MEDIASTINUM/AXILLAE: No evidence for mediastinal hematoma. Normal  heart size without pericardial effusion. The contour of the thoracic  aorta is within normal limits. Mild ectasia of the ascending thoracic  aorta measuring up to 4 cm in diameter, similar. No central pulmonary  embolism.    CORONARY ARTERY CALCIFICATIONS: Moderate.    UPPER ABDOMEN: Unchanged left adrenal nodule, previously characterized  as an adrenal adenoma. Nonobstructing punctate calculus of the left  upper pole kidney. Renal cysts as previously characterized by  ultrasound. Parenchymal thinning of the right interpolar kidney.    MUSCULOSKELETAL: Acute mildly displaced fracture of the inferior  sternal body with mild surrounding inflammatory stranding without  defined soft tissue hematoma. Remote compression fracture T11 with  similar height loss and bony retropulsion of the superior endplate.  Remote fracture of the left L2 pedicle.      Impression    IMPRESSION:  1.  Acute mildly displaced fracture of the inferior sternal body with  mild surrounding inflammatory stranding. No defined soft tissue or  mediastinal hematoma.  2.  No additional acute findings within the chest.  3.  Unchanged chronic findings as above.    ERVIN PEREZ MD         SYSTEM ID:  [id number]   Basic metabolic panel     Status: Abnormal   Result Value Ref Range    Sodium 142 135 - 145 mmol/L    Potassium 4.0 3.4 - 5.3 mmol/L    Chloride 105 98 - 107 mmol/L    Carbon Dioxide (CO2) 24 22 - 29 mmol/L     Anion Gap 13 7 - 15 mmol/L    Urea Nitrogen 30.3 (H) 8.0 - 23.0 mg/dL    Creatinine 0.93 0.51 - 0.95 mg/dL    GFR Estimate 65 >60 mL/min/1.73m2    Calcium 10.1 8.8 - 10.2 mg/dL    Glucose 106 (H) 70 - 99 mg/dL   Troponin T, High Sensitivity     Status: Abnormal   Result Value Ref Range    Troponin T, High Sensitivity 31 (H) <=14 ng/L   [place] Draw     Status: None    Narrative    The following orders were created for panel order [facility] Draw.  Procedure                               Abnormality         Status                     ---------                               -----------         ------                     Extra Blue Top Tube[[id number]]                              Final result               Extra Red Top Tube[[id number]]                               Final result                 Please view results for these tests on the individual orders.   CBC with platelets and differential     Status: Abnormal   Result Value Ref Range    WBC Count 9.8 4.0 - 11.0 10e3/uL    RBC Count 4.23 3.80 - 5.20 10e6/uL    Hemoglobin 12.3 11.7 - 15.7 g/dL    Hematocrit 39.4 35.0 - 47.0 %    MCV 93 78 - 100 fL    MCH 29.1 26.5 - 33.0 pg    MCHC 31.2 (L) 31.5 - 36.5 g/dL    RDW 15.3 (H) 10.0 - 15.0 %    Platelet Count 306 150 - 450 10e3/uL    % Neutrophils 73 %    % Lymphocytes 13 %    % Monocytes 9 %    % Eosinophils 3 %    % Basophils 1 %    % Immature Granulocytes 1 %    NRBCs per 100 WBC 0 <1 /100    Absolute Neutrophils 7.2 1.6 - 8.3 10e3/uL    Absolute Lymphocytes 1.3 0.8 - 5.3 10e3/uL    Absolute Monocytes 0.9 0.0 - 1.3 10e3/uL    Absolute Eosinophils 0.3 0.0 - 0.7 10e3/uL    Absolute Basophils 0.1 0.0 - 0.2 10e3/uL    Absolute Immature Granulocytes 0.1 <=0.4 10e3/uL    Absolute NRBCs 0.0 10e3/uL   Extra Blue Top Tube     Status: None   Result Value Ref Range    Hold Specimen JIC    Extra Red Top Tube     Status: None   Result Value Ref Range    Hold Specimen JIC    Creatinine POCT     Status: Abnormal   Result Value Ref Range     Creatinine POCT 1.1 (H) 0.5 - 1.0 mg/dL    GFR, ESTIMATED POCT 53 (L) >60 mL/min/1.73m2   Troponin T, High Sensitivity     Status: Abnormal   Result Value Ref Range    Troponin T, High Sensitivity 26 (H) <=14 ng/L   EKG 12-lead, tracing only     Status: None   Result Value Ref Range    Systolic Blood Pressure  mmHg    Diastolic Blood Pressure  mmHg    Ventricular Rate 89 BPM    Atrial Rate 89 BPM    ID Interval 170 ms    QRS Duration 122 ms     ms    QTc 479 ms    P Axis 11 degrees    R AXIS 68 degrees    T Axis 1 degrees    Interpretation ECG       Sinus rhythm  Right bundle branch block  Inferior infarct , age undetermined  Abnormal ECG  When compared with ECG of 04-DEC-2023 18:13,  Premature ventricular complexes are no longer Present  Questionable change in QRS axis  Inferior infarct is now Present  T wave inversion now evident in Inferior leads  Unconfirmed report - interpretation of this ECG is computer generated - see medical record for final interpretation  Confirmed by - EMERGENCY ROOM, PHYSICIAN ([id number]),  Juice Luke ([id number]) on 2/16/2024 10:53:43 AM     EKG 12-lead, tracing only     Status: None (Preliminary result)   Result Value Ref Range    Systolic Blood Pressure  mmHg    Diastolic Blood Pressure  mmHg    Ventricular Rate 73 BPM    Atrial Rate 73 BPM    ID Interval 188 ms    QRS Duration 132 ms     ms    QTc 471 ms    P Axis 47 degrees    R AXIS 67 degrees    T Axis 39 degrees    Interpretation ECG       Sinus rhythm  Right bundle branch block  Abnormal ECG  When compared with ECG of 16-FEB-2024 09:55,  Criteria for Inferior infarct are no longer Present  ST no longer depressed in Anterior leads  T wave inversion no longer evident in Inferior leads     Echocardiogram Complete     Status: None   Result Value Ref Range    LVEF  35%     [facility]    [id number]  [id number]  [id number]  [id number]^[id number]^SERGIO     [facility]  Echocardiography Laboratory  [address]  [place] [address]     Name: DWAYNE STILES  MRN: [id number]  : 1950  Study Date: 2024 03:31 PM  Age: 73 yrs  Gender: Female  Patient [facility]: [facility]  Reason For Study: Pericardial Effusion  Ordering Physician: SERGIO MOYA  Referring Physician: Matilde Quiñonez  Performed By: Molly Morgan     BSA: 1.9 m2  Height: 66 in  Weight: 187 lb  HR: 77  BP: 156/100 mmHg  ______________________________________________________________________________  Procedure  Complete Portable Echo Adult. Optison (NDC #[id number]) given intravenously.  Poor acoustic windows.  ______________________________________________________________________________  Interpretation Summary     The left ventricle is mildly dilated.  The visual ejection fraction is estimated at 35%.  The right ventricle is normal in structure, function and size.  Aortic root dilatation is present.  TDS- fractured sternum. Fat pad vs. effusion(small)  Clinical correlation required The study was technically difficult. Compared to  prior study, changes are noted.  ______________________________________________________________________________  Left Ventricle  The left ventricle is mildly dilated. There is normal left ventricular wall  thickness. The visual ejection fraction is estimated at 35%. There is mod-  severe global hypokinesia of the left ventricle.     Right Ventricle  The right ventricle is normal in structure, function and size.     Atria  Normal left atrial size. Right atrial size is normal. There is no atrial shunt  seen.     Mitral Valve  The mitral valve is normal in structure and function. There is trace mitral  regurgitation.     Tricuspid Valve  The tricuspid valve is normal in structure and function. There is trace  tricuspid regurgitation. Right ventricular systolic pressure could not be  approximated due to inadequate tricuspid regurgitation.     Aortic Valve  There is mild trileaflet aortic sclerosis. No aortic  regurgitation is present.     Pulmonic Valve  The pulmonic valve is not well seen, but is grossly normal.     Vessels  Aortic root dilatation is present. Boerline dilation of ascending aorta.     Pericardium  There is no pericardial effusion.     Rhythm  Sinus rhythm was noted.  ______________________________________________________________________________  MMode/2D Measurements & Calculations  IVSd: 1.1 cm     LVIDd: 5.6 cm  LVIDs: 4.1 cm  LVPWd: 0.93 cm  FS: 27.4 %  LV mass(C)d: 227.8 grams  LV mass(C)dI: 117.2 grams/m2  Ao root diam: 4.0 cm  asc Aorta Diam: 3.7 cm  LVOT diam: 2.0 cm  LVOT area: 3.2 cm2  Ao root diam index Ht(cm/m): 2.4  Ao root diam index BSA (cm/m2): 2.1  Asc Ao diam index BSA (cm/m2): 1.9  Asc Ao diam index Ht(cm/m): 2.2  LA Volume (BP): 39.7 ml     LA Volume Index (BP): 20.5 ml/m2  RWT: 0.33  TAPSE: 3.0 cm     Doppler Measurements & Calculations  MV E max jose: 60.8 cm/sec  MV A max jose: 99.0 cm/sec  MV E/A: 0.61  MV max P.1 mmHg  MV mean P.7 mmHg  MV V2 VTI: 20.0 cm  MVA(VTI): 3.7 cm2  MV dec time: 0.16 sec  Ao V2 max: 142.0 cm/sec  Ao max P.0 mmHg  Ao V2 mean: 95.5 cm/sec  Ao mean P.0 mmHg  Ao V2 VTI: 27.7 cm  DANIEL(I,D): 2.7 cm2  DANIEL(V,D): 2.6 cm2  LV V1 max P.4 mmHg  LV V1 max: 116.0 cm/sec  LV V1 VTI: 23.4 cm  SV(LVOT): 74.6 ml  SI(LVOT): 38.4 ml/m2  PA V2 max: 107.8 cm/sec  PA max P.6 mmHg  PA acc time: 0.13 sec  AV Jose Ratio (DI): 0.82  DANIEL Index (cm2/m2): 1.4  E/E' av.4     Lateral E/e': 5.5  Medial E/e': 11.2  RV S Jose: 14.5 cm/sec     ______________________________________________________________________________  Report approved by: Ngoc Garcia 2024 04:28 PM         CBC with platelets differential     Status: Abnormal    Narrative    The following orders were created for panel order CBC with platelets differential.  Procedure                               Abnormality         Status                     ---------                                -----------         ------                     CBC with platelets and d...[[id number]]  Abnormal            Final result                 Please view results for these tests on the individual orders.     lAicia Love PA-C  [facility]  Securely message with the Vocera Web Console (learn more here)  Text page via [employer] Paging/Directory  I discussed the patient with Dr. Mace and he agrees with the above plan.
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
## 2024-02-17 NOTE — PLAN OF CARE
Goal Outcome Evaluation:      Plan of Care Reviewed With: patient    Overall Patient Progress: no change    Pt A&O x4. Pt calm and cooperative. Contact precaution for MRSA. Pain present this shift, prn dilaudid given per MAR. RA. Tele: SR w/ BBB, long Qtc, PVCs. Right PIV SL; dry, clean and intact. Lower extremity edema present. Pt nauseous this shift, zofran given per MAR. LBM 2/16/24. RN will continue with POC.

## 2024-02-17 NOTE — UTILIZATION REVIEW
Admission Status; Secondary Review Determination    Under the authority of the Utilization Management Committee, the utilization review process indicated a secondary review on the above patient. The review outcome is based on review of the medical records, discussions with staff, and applying clinical experience noted on the date of the review.    (x) Inpatient Status Appropriate - This patient's medical care is consistent with medical management for inpatient care and reasonable inpatient medical practice.    RATIONALE FOR DETERMINATION: 73-year-old female with history of hypertension, breast cancer who suffered a fall onto her sternum resulted in severe chest pain unable to take a deep breath with the severity reached a point she could not tolerate the pain using hydrocodone at home and presented to the emergency room.  Patient has an acutely mild displaced fracture of the inferior sternal body and on investigation with an echocardiogram was found to have a brand-new cardiomyopathy with ejection fraction of 35% compared to normal ejection fraction 2 months earlier.  Patient required frequent doses of rescue intravenous Dilaudid as she has failed attempts for hydrocodone during the earlier part of patient's hospital evaluation.  The severity of pain and the frequency of IV Dilaudid in the face of a new cardiomyopathy is appropriate for inpatient management.    At the time of admission with the information available to the attending physician more than 2 nights Hospital complex care was anticipated, based on patient risk of adverse outcome if treated as outpatient and complex care required. Inpatient admission is appropriate based on the Medicare guidelines.    This document was produced using voice recognition software    The information on this document is developed by the utilization review team in order for the business office to ensure compliance. This only denotes the appropriateness of proper admission  status and does not reflect the quality of care rendered.    The definitions of Inpatient Status and Observation Status used in making the determination above are those provided in the CMS Coverage Manual, Chapter 1 and Chapter 6, section 70.4.    Sincerely,    Deuce Daniel MD  Utilization Review  Physician Advisor  [facility].

## 2024-02-17 NOTE — CONSULTS
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    An echocardiogram obtained on 2/16/2024 demonstrated moderate left ventricular dysfunction which represented an interval decline compared to her echocardiogram from 12/22/2023.  Cardiac troponins were minimally elevated and flat in trajectory.    IMPRESSION:    History of symptomatic PVCs thought to be of left-sided origin for which the patient underwent a comprehensive cardiac evaluation in 2009.  Recent Zio patch monitoring demonstrated a PVC burden of approximately 11%.  Small area of mid inferior wall thinning as well as a 3 cm RV aneurysm noted on cardiovascular MRI in 2009.  Fall resulting in a sternal fracture.  Currently conservative therapy is planned with pain control.  Mild to moderate left ventricular dysfunction which is new compared to her previous echocardiogram on 12/22/2023.  This may represent a stress related phenomenon.  The patient has no previous history of exertional chest discomfort and her current chest discomfort is entirely pleuritic and reproducible to palpation.  Hypertension.    PLAN    -I reviewed her echocardiogram personally and compared to the previous study.  Left ventricular systolic function appears mildly to  moderately reduced compared to the previous study in December 2023.  Interestingly, focal outpouching of the RV free wall is appreciated on subcostal imaging.    - Given her lack of anginal symptoms and flat troponins I suspect this may be a stress related phenomenon in the context of her fall and sternal fracture.  -At this point she is on appropriate medical therapy with metoprolol XL, lisinopril and spironolactone.  I would recommend continued symptomatic management for pain control and deferring further cardiac evaluation until her acute issues have resolved.  -I would recommend a stress cardiovascular MRI as an outpatient for a reassessment of left ventricular systolic function as well as to rule out the interim development of significant coronary artery disease.  -Please call with questions.    >80 minutes of time spent today pre and post charting, reviewing pertinent investigations personally as well as extensive review of previous records and coordinating plans for further evaluation.               Chief Complaint:   Fractured Sternum           History of Present Illness:   This patient is a 73 year old female who is followed previously by Dr. Dyson at [facility] cardiology, and recently was seen by Dr. Rolle at [facility].  She has a long history of symptomatic PVCs which were thought to be of left-sided origin.  Her cardiac workup in 2009 included coronary angiography which was within normal limits as well as a cardiovascular MRI which demonstrated a small area of mid inferior wall thinning as well as an RV aneurysm in the context of low normal left ventricular systolic function and normal right ventricular systolic function.  A recent Zio patch monitor demonstrated an 11% PVC burden and cardiology consultation she is scheduled to follow-up with electrophysiology in the near future.    Her other pertinent medical history includes a history of neuropathy and breast cancer for which she has undergone bilateral  mastectomy and chemotherapy in the past.    Earlier this week she fell due to her neuropathy and hit her chest on a wooden file cabinet.  She has been experiencing pleuritic chest discomfort since then, and was ultimately found to have a sternal fracture.  She presented to the emergency department for further evaluation.    She denies any history of exertional chest discomfort, syncope or presyncope.  She denies any PND orthopnea.  Currently her main complaint is that of sternal discomfort that is reproducible to palpation and made worse with inspiration.           Physical Exam:   Vitals were reviewed  Blood pressure 134/80, pulse 91, temperature 98.5  F (36.9  C), temperature source Oral, resp. rate 16, weight 85.8 kg (189 lb 2.5 oz), SpO2 95%, not currently breastfeeding.  Temperatures:  Current - Temp: 98.5  F (36.9  C); Max - Temp  Av  F (37.2  C)  Min: 98.5  F (36.9  C)  Max: 99.5  F (37.5  C)  Respiration range: Resp  Av.7  Min: 8  Max: 29  Pulse range: Pulse  Av.9  Min: 70  Max: 105  Blood pressure range: Systolic (24hrs), Av , Min:134 , Max:166   ; Diastolic (24hrs), Av, Min:80, Max:115    Pulse oximetry range: SpO2  Av.3 %  Min: 94 %  Max: 98 %  No intake or output data in the 24 hours ending 24 0908  Constitutional:   awake, alert, cooperative, no apparent distress, and appears stated age       Neck:   supple, symmetrical, trachea midline, no JVD     Back:   symmetric     Lungs:   Chest appears bruised with cutaneous changes secondary to previous radiation therapy.       Cardiovascular:   Normal apical impulse, regular rate and rhythm, normal S1 and S2, no S3 or S4, soft systolic murmur at right upper sternal border                     Past Medical History:   I have reviewed this patient's past medical history  Past Medical History:   Diagnosis Date    Arthritis     Bone disease     Breast cancer (H)     Diabetes (H)     H/O kyphoplasty     Hearing problem     History of  blood transfusion     History of kidney stones     History of radiation therapy     Hyperlipemia     Hypertension     Hypopotassemia     Irregular heart beat     Kidney problem     Lymph edema     Medullary sponge kidney     Osteopenia     PONV (postoperative nausea and vomiting)     Reduced vision     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    Thyroid disease              Past Surgical History:   I have reviewed this patient's past surgical history  Past Surgical History:   Procedure Laterality Date    ABDOMEN SURGERY      ovarian cyst, mesh    ARTHRODESIS WRIST Right     ARTHRODESIS WRIST  02/14/2013    Procedure: ARTHRODESIS WRIST;  left wrist scaphoid excision, four bone fusion, iliac crest bone graft  ( Mac with block);  Surgeon: Av Mendez MD;  Location:  OR    BIOPSY      skin, vaginal    BLEPHAROPLASTY BILATERAL Bilateral 05/06/2022    Procedure: UPPER BLEPHAROPLASTY, BILATERAL;  Surgeon: Jemal Sanchez MD;  [place] [facility] [place]    CATARACT IOL, RT/LT Right 03/13/2018    CATARACT IOL, RT/LT Left 02/20/2018    COLONOSCOPY  12/24/2013    Procedure: COMBINED COLONOSCOPY, SINGLE BIOPSY/POLYPECTOMY BY BIOPSY;  COLONOSCOPY;  Surgeon: Dom Alvarez MD;  Location: [facility]    COSMETIC BLEPHAROPLASTY LOWER LIDS BILATERAL Bilateral 05/06/2022    Procedure: BLEPHAROPLASTY, LOWER EYELID, BILATERAL, COSMETIC;  Surgeon: Jemal Sanchez MD;  [place] [facility] [place]    COSMETIC SURGERY      ESOPHAGOSCOPY, GASTROSCOPY, DUODENOSCOPY (EGD), COMBINED N/A 11/23/2016    Procedure: COMBINED ESOPHAGOSCOPY, GASTROSCOPY, DUODENOSCOPY (EGD);  Surgeon: Quinten Feliciano MD;  Location:  GI    EXTERNAL EAR SURGERY      right    EYE SURGERY      radial keratomy    FUSION, SPINE, INTERBODY, OBLIQUE ANTERIOR AND LUMBAR, 2 LEVELS, POST APPROACH, USING OTS N/A 11/18/2021    Procedure: Part 1: Oblique Anterior Interbody Fusion at Lumbar 5 to sacral 1 with use of Bone Morphogenic Protein,;  Surgeon: Serge Ramirez  MD Juan;  Location: [facility] OR    GI SURGERY      Umbilical hernia repair    GRAFT BONE FROM ILIAC CREST  02/14/2013    Procedure: GRAFT BONE FROM ILIAC CREST;  mac with block and local infilitration;  Surgeon: Av Mendez MD;  [facility]    HC BREATH HYDROGEN TEST N/A 10/14/2016    Procedure: HYDROGEN BREATH TEST;  Surgeon: Cheri Barron MD;  Location: [facility] GI    HERNIA REPAIR      umbilical age 18 mos.    HYSTERECTOMY TOTAL ABDOMINAL  05/03/2000    INJECT EPIDURAL CAUDAL N/A 09/12/2023    Procedure: Caudal epidural steroid injection with fluoroscopy;  Surgeon: Natasha Umaña MD;  Location: [facility] OR    INJECT EPIDURAL CAUDAL N/A 1/2/2024    Procedure: INJECTION, EPIDURAL, CAUDAL;  Surgeon: Natasha Umaña MD;  Location: [facility] OR    INJECT EPIDURAL LUMBAR N/A 05/23/2023    Procedure: L3-4 interlaminar epidural steroid injection with fluoroscopy ( left> right);  Surgeon: Natasha Umaña MD;  Location: [facility] OR    INJECT JOINT SACROILIAC Left 04/27/2023    Procedure: Left sacroiliac joint steroid injection with fluoroscopy;  Surgeon: Natasha Umaña MD;  Location: [facility] OR    MASTECTOMY MODIFIED RADICAL Bilateral     bilateral; right breast prophylactic    OPTICAL TRACKING SYSTEM FUSION POSTERIOR SPINE LUMBAR N/A 11/18/2021    Procedure: Open Posterior Instrumented Spinal Fusion at Lumbar 5 to Sacral 1, with grimm aguayo osteotomy, use of O-Arm/Stealth;  Surgeon: Serge Ramirez MD;  Location: [facility] OR    PARATHYROIDECTOMY  09/23/2004    R SUPERIOR    PARATHYROIDECTOMY  09/23/2004    parathyroid resection, subtotal    RELEASE CARPAL TUNNEL Right 12/02/2021    Procedure: RIGHT CARPAL TUNNEL RELEASE, RIGHT WRIST HARDWARE REMOVAL, RIGHT CARPAL BOSS EXCISION;  Surgeon: Rolan Conley MD;  Location: [facility] OR    REMOVE HARDWARE HAND  09/24/2013    Procedure: REMOVE HARDWARE HAND;  Left Hand Screw Removal       RHINOPLASTY  1968    thyr proc skin closed cosmetic manner by subcuticular stitch   01/23/2009    THYROPLASTY  10/09/2009    TONSILLECTOMY  1977    VITRECTOMY PARSPLANA WITH 25 GAUGE SYSTEM Left 06/20/2022    Procedure: LEFT EYE VITRECTOMY, PARS PLANA APPROACH, USING 25-GAUGE INSTRUMENTS, laser;  Surgeon: Felix Carlos MD;  Location: [facility] OR    WRIST SURGERY      wrist arthrodesis               Social History:   I have reviewed this patient's social history  Social History     Tobacco Use    Smoking status: Never     Passive exposure: Never    Smokeless tobacco: Never   Substance Use Topics    Alcohol use: Not Currently     Alcohol/week: 7.0 standard drinks of alcohol     Types: 7 Glasses of wine per week     Comment: Rare if ever             Family History:   I have reviewed this patient's family history  Family History   Problem Relation Age of Onset    Neurologic Disorder Mother         Anuerysm of Cerebral Artery, Dementia    Diabetes Mother     Thyroid Disease Mother         Hyperthyroidism (goiter)    Cerebrovascular Disease Mother         Hemorrhagic    Dementia Mother     Osteoporosis Mother     Hyperlipidemia Mother     Heart Disease Father         AAA    Hypertension Father     Coronary Artery Disease Father     Hyperlipidemia Father     Mental Illness Father         Schizophrenia?    Dementia Brother         hydrocephalis    Circulatory Brother         Perihperal Neurophathy    Diabetes Maternal Grandmother         Presumed Type 2    Asthma Maternal Grandmother     Chronic Obstructive Pulmonary Disease Maternal Grandfather         father    Asthma Maternal Grandfather     Diabetes Maternal Aunt         x2    Melanoma Maternal Aunt     Glaucoma Maternal Aunt     Breast Cancer Cousin     Breast Cancer Cousin     Dementia Other     Cancer Other         malignant melanoma    Hypertension Other     Hypertension Other     Cerebrovascular Disease Other     Cerebrovascular Disease Other     Obesity Other     Respiratory Other     Cancer Other     Diabetes Other     Asthma Other      Other Cancer Other         Malignant melanoma    Obesity Other     Macular Degeneration No family hx of     Kidney Disease No family hx of     Thrombosis No family hx of     Arthritis No family hx of     Depression No family hx of     Substance Abuse No family hx of     Cystic Fibrosis No family hx of     Early Death No family hx of     Coronary Artery Disease Early Onset No family hx of     Heart Failure No family hx of     Bleeding Diathesis No family hx of     Ovarian Cancer No family hx of     Uterine Cancer No family hx of     Prostate Cancer No family hx of     Colorectal Cancer No family hx of     Pancreatic Cancer No family hx of     Lung Cancer No family hx of     Autoimmune Disease No family hx of     Unknown/Adopted No family hx of     Genetic Disorder No family hx of     Bleeding Disorder No family hx of     Clotting Disorder No family hx of     Anesthesia Reaction No family hx of              Allergies:     Allergies   Allergen Reactions    Erythromycin Nausea    Penicillins Hives     Around age 6 - doesn't recall full reaction, mother told her it was hives.     Has tolerated cephalsporins.              Medications:   I have reviewed this patient's current medications  Facility-Administered Medications Prior to Admission   Medication Dose Route Frequency Provider Last Rate Last Admin    dexamethasone (DECADRON) injection 1 mL  1 mL   Rolan Conley MD   1 mL at 02/14/23 1445    dexamethasone (DECADRON) injection 1 mL  1 mL   Rolan Conley MD   1 mL at 08/09/22 1217    lidocaine 1 % injection 1 mL  1 mL   Rolan Conley MD   1 mL at 11/14/23 1120    romosozumab-aqqg (EVENITY) injection 210 mg  210 mg Subcutaneous Q30 Days Cortney Clark MD   210 mg at 04/12/23 1307    triamcinolone (KENALOG-40) injection 40 mg  40 mg   Rolan Conley MD   40 mg at 11/14/23 1120    triamcinolone (KENALOG-40) injection 40 mg  40 mg   Rolan Conley MD   40 mg at 11/14/23 1120    [DISCONTINUED] lidocaine 1 % injection 1 mL   1 mL   Rolan Conley MD   1 mL at 11/14/23 1120    [DISCONTINUED] romosozumab-aqqg (EVENITY) injection 210 mg  210 mg Subcutaneous Q30 Days Cortney Clark MD   210 mg at 01/04/23 1307    [DISCONTINUED] triamcinolone (KENALOG-40) injection 40 mg  40 mg   Rolan Conley MD   40 mg at 07/11/23 1005    [DISCONTINUED] triamcinolone (KENALOG-40) injection 40 mg  40 mg   Rolan Conley MD   40 mg at 02/14/23 1447    [DISCONTINUED] triamcinolone (KENALOG-40) injection 40 mg  40 mg   Rolan Conley MD   40 mg at 02/14/23 1447     Medications Prior to Admission   Medication Sig Dispense Refill Last Dose    acetaminophen (TYLENOL) 500 MG tablet Take 1,000 mg by mouth 3 times daily   2/16/2024 at x1    amLODIPine (NORVASC) 5 MG tablet Take 1 tablet (5 mg) by mouth daily 90 tablet 3 2/16/2024 at am    Ascorbic Acid (VITAMIN C) 500 MG CHEW Take 1 tablet by mouth daily   2/15/2024 at am    atorvastatin (LIPITOR) 80 MG tablet Take 1 tablet (80 mg) by mouth daily 90 tablet 3 2/15/2024 at pm    Cholecalciferol (VITAMIN D3) 50 MCG (2000 UT) CAPS TAKE 3 CAPSULES (6,000 UNITS) BY MOUTH DAILY. 270 capsule 3 2/15/2024 at am    CRANBERRY EXTRACT PO Take 2 tablets by mouth daily   2/15/2024 at am    diclofenac (VOLTAREN) 1 % topical gel Apply topically daily as needed for moderate pain Apply to wrist   prn at prn    gabapentin (NEURONTIN) 300 MG capsule Take 4 capsules (1,200 mg) by mouth 3 times daily 1080 capsule 3 2/16/2024 at x1    HEMP OIL OR EXTRACT OR OTHER CBD CANNABINOID, NOT MEDICAL CANNABIS, THC       HYDROcodone-acetaminophen (NORCO) 5-325 MG tablet Take 1 tab for breakthrough pain 1-2 times a week. (Patient taking differently: Take 1-2 tablets by mouth daily as needed for breakthrough pain) 30 tablet 0 2/16/2024 at am    ibuprofen (ADVIL/MOTRIN) 200 MG tablet Take 600-800 mg by mouth daily as needed for pain   prn at prn    levothyroxine (SYNTHROID/LEVOTHROID) 112 MCG tablet TAKE 1/2 TAB BY MOUTH EVERY DAY 45 tablet 3  2/16/2024 at am    lisinopril (ZESTRIL) 40 MG tablet Take 1 tablet (40 mg) by mouth daily 90 tablet 3 2/16/2024 at am    magnesium 250 MG tablet Take 1 tablet by mouth every evening   2/15/2024 at pm    Melatonin 10 MG TABS tablet Take 10 mg by mouth at bedtime   2/15/2024 at pm    metoprolol succinate ER (TOPROL XL) 50 MG 24 hr tablet Take 1 tablet (50 mg) by mouth daily 90 tablet 3 2/16/2024 at am    Multiple Vitamin (MULTI-VITAMIN) per tablet Take 1 tablet by mouth daily   2/15/2024 at am    spironolactone (ALDACTONE) 25 MG tablet Take 25 mg by mouth 2 times daily   2/16/2024 at x2    tiZANidine (ZANAFLEX) 4 MG tablet Take 4 mg by mouth at bedtime   2/15/2024 at pm    Vaginal Lubricant (REPHRESH) GEL Place 2 g vaginally every 3 days 2 g 3 2/14/2024    naloxone (NARCAN) 4 MG/0.1ML nasal spray Spray 1 spray (4 mg) into one nostril alternating nostrils once as needed for opioid reversal every 2-3 minutes until assistance arrives 0.2 mL 0 never filled    ondansetron (ZOFRAN-ODT) 4 MG ODT tab Take 1 tablet (4 mg) by mouth every 12 hours as needed for nausea 180 tablet 3 prn at prn     Current Facility-Administered Medications Ordered in Epic   Medication Dose Route Frequency Last Rate Last Admin    acetaminophen (TYLENOL) tablet 975 mg  975 mg Oral Q6H   975 mg at 02/17/24 0620    Or    acetaminophen (TYLENOL) Suppository 650 mg  650 mg Rectal Q6H        amLODIPine (NORVASC) tablet 5 mg  5 mg Oral Daily   5 mg at 02/17/24 0810    atorvastatin (LIPITOR) tablet 80 mg  80 mg Oral QPM   80 mg at 02/17/24 0124    calcium carbonate (TUMS) chewable tablet 1,000 mg  1,000 mg Oral 4x Daily PRN        diclofenac (VOLTAREN) 1 % topical gel 2 g  2 g Topical Daily PRN        gabapentin (NEURONTIN) capsule 1,200 mg  1,200 mg Oral TID   1,200 mg at 02/17/24 0809    HYDROmorphone (DILAUDID) injection 0.2 mg  0.2 mg Intravenous Q2H PRN        HYDROmorphone (DILAUDID) injection 0.4 mg  0.4 mg Intravenous Q2H PRN   0.4 mg at 02/17/24  0739    hydrOXYzine HCl (ATARAX) tablet 25 mg  25 mg Oral Q6H PRN        Or    hydrOXYzine HCl (ATARAX) tablet 50 mg  50 mg Oral Q6H PRN        levothyroxine (SYNTHROID/LEVOTHROID) half-tab 56 mcg  56 mcg Oral QAM AC        lidocaine (LMX4) cream   Topical Q1H PRN        lidocaine 1 % 0.1-1 mL  0.1-1 mL Other Q1H PRN        lisinopril (ZESTRIL) tablet 40 mg  40 mg Oral Daily   40 mg at 02/17/24 0810    magnesium oxide (MAG-OX) tablet 400 mg  400 mg Oral QPM   400 mg at 02/17/24 0125    metoprolol succinate ER (TOPROL XL) 24 hr tablet 50 mg  50 mg Oral Daily   50 mg at 02/17/24 0809    naloxone (NARCAN) injection 0.2 mg  0.2 mg Intravenous Q2 Min PRN        Or    naloxone (NARCAN) injection 0.4 mg  0.4 mg Intravenous Q2 Min PRN        Or    naloxone (NARCAN) injection 0.2 mg  0.2 mg Intramuscular Q2 Min PRN        Or    naloxone (NARCAN) injection 0.4 mg  0.4 mg Intramuscular Q2 Min PRN        ondansetron (ZOFRAN ODT) ODT tab 4 mg  4 mg Oral Q6H PRN   4 mg at 02/17/24 0810    Or    ondansetron (ZOFRAN) injection 4 mg  4 mg Intravenous Q6H PRN        oxyCODONE (ROXICODONE) tablet 10 mg  10 mg Oral Q4H PRN        oxyCODONE (ROXICODONE) tablet 5 mg  5 mg Oral Q4H PRN        senna-docusate (SENOKOT-S/PERICOLACE) 8.6-50 MG per tablet 1 tablet  1 tablet Oral BID PRN        Or    senna-docusate (SENOKOT-S/PERICOLACE) 8.6-50 MG per tablet 2 tablet  2 tablet Oral BID PRN        sodium chloride (PF) 0.9% PF flush 3 mL  3 mL Intracatheter Q8H   3 mL at 02/17/24 0137    sodium chloride (PF) 0.9% PF flush 3 mL  3 mL Intracatheter q1 min prn        spironolactone (ALDACTONE) tablet 25 mg  25 mg Oral BID   25 mg at 02/17/24 0809    tiZANidine (ZANAFLEX) tablet 4 mg  4 mg Oral Q8H PRN   4 mg at 02/17/24 0135     No current Epic-ordered outpatient medications on file.             Review of Systems:     The 10 point Review of Systems is negative other than noted in the HPI            Data:   All laboratory data reviewed  Results for  orders placed or performed during the hospital encounter of 02/16/24 (from the past 24 hour(s))   EKG 12-lead, tracing only   Result Value Ref Range    Systolic Blood Pressure  mmHg    Diastolic Blood Pressure  mmHg    Ventricular Rate 89 BPM    Atrial Rate 89 BPM    DC Interval 170 ms    QRS Duration 122 ms     ms    QTc 479 ms    P Axis 11 degrees    R AXIS 68 degrees    T Axis 1 degrees    Interpretation ECG       Sinus rhythm  Right bundle branch block  Inferior infarct , age undetermined  Abnormal ECG  When compared with ECG of 04-DEC-2023 18:13,  Premature ventricular complexes are no longer Present  Questionable change in QRS axis  Inferior infarct is now Present  T wave inversion now evident in Inferior leads  Unconfirmed report - interpretation of this ECG is computer generated - see medical record for final interpretation  Confirmed by - EMERGENCY ROOM, PHYSICIAN ([id number]),  Juice Luke ([id number]) on 2/16/2024 10:53:43 AM     CBC with platelets differential    Narrative    The following orders were created for panel order CBC with platelets differential.  Procedure                               Abnormality         Status                     ---------                               -----------         ------                     CBC with platelets and d...[[id number]]  Abnormal            Final result                 Please view results for these tests on the individual orders.   Basic metabolic panel   Result Value Ref Range    Sodium 142 135 - 145 mmol/L    Potassium 4.0 3.4 - 5.3 mmol/L    Chloride 105 98 - 107 mmol/L    Carbon Dioxide (CO2) 24 22 - 29 mmol/L    Anion Gap 13 7 - 15 mmol/L    Urea Nitrogen 30.3 (H) 8.0 - 23.0 mg/dL    Creatinine 0.93 0.51 - 0.95 mg/dL    GFR Estimate 65 >60 mL/min/1.73m2    Calcium 10.1 8.8 - 10.2 mg/dL    Glucose 106 (H) 70 - 99 mg/dL   Troponin T, High Sensitivity   Result Value Ref Range    Troponin T, High Sensitivity 31 (H) <=14 ng/L   [place] Draw    Narrative     The following orders were created for panel order [place] Draw.  Procedure                               Abnormality         Status                     ---------                               -----------         ------                     Extra Blue Top Tube[[id number]]                              Final result               Extra Red Top Tube[[id number]]                               Final result                 Please view results for these tests on the individual orders.   CBC with platelets and differential   Result Value Ref Range    WBC Count 9.8 4.0 - 11.0 10e3/uL    RBC Count 4.23 3.80 - 5.20 10e6/uL    Hemoglobin 12.3 11.7 - 15.7 g/dL    Hematocrit 39.4 35.0 - 47.0 %    MCV 93 78 - 100 fL    MCH 29.1 26.5 - 33.0 pg    MCHC 31.2 (L) 31.5 - 36.5 g/dL    RDW 15.3 (H) 10.0 - 15.0 %    Platelet Count 306 150 - 450 10e3/uL    % Neutrophils 73 %    % Lymphocytes 13 %    % Monocytes 9 %    % Eosinophils 3 %    % Basophils 1 %    % Immature Granulocytes 1 %    NRBCs per 100 WBC 0 <1 /100    Absolute Neutrophils 7.2 1.6 - 8.3 10e3/uL    Absolute Lymphocytes 1.3 0.8 - 5.3 10e3/uL    Absolute Monocytes 0.9 0.0 - 1.3 10e3/uL    Absolute Eosinophils 0.3 0.0 - 0.7 10e3/uL    Absolute Basophils 0.1 0.0 - 0.2 10e3/uL    Absolute Immature Granulocytes 0.1 <=0.4 10e3/uL    Absolute NRBCs 0.0 10e3/uL   Extra Blue Top Tube   Result Value Ref Range    Hold Specimen JIC    Extra Red Top Tube   Result Value Ref Range    Hold Specimen JIC    Creatinine POCT   Result Value Ref Range    Creatinine POCT 1.1 (H) 0.5 - 1.0 mg/dL    GFR, ESTIMATED POCT 53 (L) >60 mL/min/1.73m2   Chest CT w IV contrast only, TRAUMA / DISSECTION    Narrative    CT CHEST WITH CONTRAST 2/16/2024 11:51 AM     HISTORY: blunt force chest injury with confirmed sternal fracture    COMPARISON: Chest radiograph 2/15/2024. CT chest 1/25/2022.    TECHNIQUE: Volumetric helical acquisition of CT images of the chest  from the clavicles to the kidneys were acquired  after the  administration of 94 mL Isovue-370 IV contrast. Radiation dose for  this scan was reduced using automated exposure control, adjustment of  the mA and/or kV according to patient size, or iterative  reconstruction technique.    FINDINGS:     LUNGS AND PLEURA: Mild subsegmental atelectasis/scarring of the lung  bases. Stable subcentimeter pulmonary nodules including a 4 mm nodule  in the right upper lobe (series 4, image 63) and 2 mm nodule along the  right major fissure (series 4, image 146). No pleural effusion or  pneumothorax.    MEDIASTINUM/AXILLAE: No evidence for mediastinal hematoma. Normal  heart size without pericardial effusion. The contour of the thoracic  aorta is within normal limits. Mild ectasia of the ascending thoracic  aorta measuring up to 4 cm in diameter, similar. No central pulmonary  embolism.    CORONARY ARTERY CALCIFICATIONS: Moderate.    UPPER ABDOMEN: Unchanged left adrenal nodule, previously characterized  as an adrenal adenoma. Nonobstructing punctate calculus of the left  upper pole kidney. Renal cysts as previously characterized by  ultrasound. Parenchymal thinning of the right interpolar kidney.    MUSCULOSKELETAL: Acute mildly displaced fracture of the inferior  sternal body with mild surrounding inflammatory stranding without  defined soft tissue hematoma. Remote compression fracture T11 with  similar height loss and bony retropulsion of the superior endplate.  Remote fracture of the left L2 pedicle.      Impression    IMPRESSION:  1.  Acute mildly displaced fracture of the inferior sternal body with  mild surrounding inflammatory stranding. No defined soft tissue or  mediastinal hematoma.  2.  No additional acute findings within the chest.  3.  Unchanged chronic findings as above.    ERVIN PEREZ MD         SYSTEM ID:  [id number]   Troponin T, High Sensitivity   Result Value Ref Range    Troponin T, High Sensitivity 26 (H) <=14 ng/L   Echocardiogram Complete   Result  Value Ref Range    LVEF  35%     [facility]    [id number]  [id number]  [id number]  [id number]^GRIFFIN^SERGIO     [facility]  Echocardiography Laboratory  [address] [place] [address]     Name: DWAYNE STILES  MRN: [id number]  : 1950  Study Date: 2024 03:31 PM  Age: 73 yrs  Gender: Female  Patient Location: [facility]  Reason For Study: Pericardial Effusion  Ordering Physician: SERGIO MOYA  Referring Physician: Matilde Quiñonez  Performed By: Molly Morgan     BSA: 1.9 m2  Height: 66 in  Weight: 187 lb  HR: 77  BP: 156/100 mmHg  ______________________________________________________________________________  Procedure  Complete Portable Echo Adult. Optison (NDC #5337-3641) given intravenously.  Poor acoustic windows.  ______________________________________________________________________________  Interpretation Summary     The left ventricle is mildly dilated.  The visual ejection fraction is estimated at 35%.  The right ventricle is normal in structure, function and size.  Aortic root dilatation is present.  TDS- fractured sternum. Fat pad vs. effusion(small)  Clinical correlation required The study was technically difficult. Compared to  prior study, changes are noted.  ______________________________________________________________________________  Left Ventricle  The left ventricle is mildly dilated. There is normal left ventricular wall  thickness. The visual ejection fraction is estimated at 35%. There is mod-  severe global hypokinesia of the left ventricle.     Right Ventricle  The right ventricle is normal in structure, function and size.     Atria  Normal left atrial size. Right atrial size is normal. There is no atrial shunt  seen.     Mitral Valve  The mitral valve is normal in structure and function. There is trace mitral  regurgitation.     Tricuspid Valve  The tricuspid valve is normal in structure and function. There is trace  tricuspid regurgitation. Right  ventricular systolic pressure could not be  approximated due to inadequate tricuspid regurgitation.     Aortic Valve  There is mild trileaflet aortic sclerosis. No aortic regurgitation is present.     Pulmonic Valve  The pulmonic valve is not well seen, but is grossly normal.     Vessels  Aortic root dilatation is present. Boerline dilation of ascending aorta.     Pericardium  There is no pericardial effusion.     Rhythm  Sinus rhythm was noted.  ______________________________________________________________________________  MMode/2D Measurements & Calculations  IVSd: 1.1 cm     LVIDd: 5.6 cm  LVIDs: 4.1 cm  LVPWd: 0.93 cm  FS: 27.4 %  LV mass(C)d: 227.8 grams  LV mass(C)dI: 117.2 grams/m2  Ao root diam: 4.0 cm  asc Aorta Diam: 3.7 cm  LVOT diam: 2.0 cm  LVOT area: 3.2 cm2  Ao root diam index Ht(cm/m): 2.4  Ao root diam index BSA (cm/m2): 2.1  Asc Ao diam index BSA (cm/m2): 1.9  Asc Ao diam index Ht(cm/m): 2.2  LA Volume (BP): 39.7 ml     LA Volume Index (BP): 20.5 ml/m2  RWT: 0.33  TAPSE: 3.0 cm     Doppler Measurements & Calculations  MV E max jose: 60.8 cm/sec  MV A max jose: 99.0 cm/sec  MV E/A: 0.61  MV max P.1 mmHg  MV mean P.7 mmHg  MV V2 VTI: 20.0 cm  MVA(VTI): 3.7 cm2  MV dec time: 0.16 sec  Ao V2 max: 142.0 cm/sec  Ao max P.0 mmHg  Ao V2 mean: 95.5 cm/sec  Ao mean P.0 mmHg  Ao V2 VTI: 27.7 cm  DANIEL(I,D): 2.7 cm2  DANIEL(V,D): 2.6 cm2  LV V1 max P.4 mmHg  LV V1 max: 116.0 cm/sec  LV V1 VTI: 23.4 cm  SV(LVOT): 74.6 ml  SI(LVOT): 38.4 ml/m2  PA V2 max: 107.8 cm/sec  PA max P.6 mmHg  PA acc time: 0.13 sec  AV Jose Ratio (DI): 0.82  DANIEL Index (cm2/m2): 1.4  E/E' av.4     Lateral E/e': 5.5  Medial E/e': 11.2  RV S Jose: 14.5 cm/sec     ______________________________________________________________________________  Report approved by: Ngoc Garcia 2024 04:28 PM         EKG 12-lead, tracing only   Result Value Ref Range    Systolic Blood Pressure  mmHg    Diastolic Blood Pressure   mmHg    Ventricular Rate 73 BPM    Atrial Rate 73 BPM    SD Interval 188 ms    QRS Duration 132 ms     ms    QTc 471 ms    P Axis 47 degrees    R AXIS 67 degrees    T Axis 39 degrees    Interpretation ECG       Sinus rhythm  Right bundle branch block  Abnormal ECG  When compared with ECG of 16-FEB-2024 09:55,  Criteria for Inferior infarct are no longer Present  ST no longer depressed in Anterior leads  T wave inversion no longer evident in Inferior leads     CBC with platelets differential    Narrative    The following orders were created for panel order CBC with platelets differential.  Procedure                               Abnormality         Status                     ---------                               -----------         ------                     CBC with platelets and d...[[id number]]  Abnormal            Final result                 Please view results for these tests on the individual orders.   Basic metabolic panel   Result Value Ref Range    Sodium 140 135 - 145 mmol/L    Potassium 4.5 3.4 - 5.3 mmol/L    Chloride 103 98 - 107 mmol/L    Carbon Dioxide (CO2) 25 22 - 29 mmol/L    Anion Gap 12 7 - 15 mmol/L    Urea Nitrogen 24.1 (H) 8.0 - 23.0 mg/dL    Creatinine 0.78 0.51 - 0.95 mg/dL    GFR Estimate 81 >60 mL/min/1.73m2    Calcium 9.3 8.8 - 10.2 mg/dL    Glucose 113 (H) 70 - 99 mg/dL   CBC with platelets and differential   Result Value Ref Range    WBC Count 10.3 4.0 - 11.0 10e3/uL    RBC Count 4.08 3.80 - 5.20 10e6/uL    Hemoglobin 11.9 11.7 - 15.7 g/dL    Hematocrit 37.3 35.0 - 47.0 %    MCV 91 78 - 100 fL    MCH 29.2 26.5 - 33.0 pg    MCHC 31.9 31.5 - 36.5 g/dL    RDW 15.2 (H) 10.0 - 15.0 %    Platelet Count 303 150 - 450 10e3/uL    % Neutrophils 75 %    % Lymphocytes 14 %    % Monocytes 7 %    % Eosinophils 2 %    % Basophils 1 %    % Immature Granulocytes 1 %    NRBCs per 100 WBC 0 <1 /100    Absolute Neutrophils 7.8 1.6 - 8.3 10e3/uL    Absolute Lymphocytes 1.4 0.8 - 5.3 10e3/uL     "Absolute Monocytes 0.7 0.0 - 1.3 10e3/uL    Absolute Eosinophils 0.2 0.0 - 0.7 10e3/uL    Absolute Basophils 0.1 0.0 - 0.2 10e3/uL    Absolute Immature Granulocytes 0.1 <=0.4 10e3/uL    Absolute NRBCs 0.0 10e3/uL     *Note: Due to a large number of results and/or encounters for the requested time period, some results have not been displayed. A complete set of results can be found in Results Review.     EKG results: Normal sinus rhythm with right bundle branch block.    Clinically Significant Risk Factors Present on Admission                  # Hypertension: Noted on problem list  # Chronic heart failure with reduced ejection fraction: last echo with EF <40%    # DMII: A1C = 6.5 % (Ref range: <5.7 %) within past 6 months    # Obesity: Estimated body mass index is 30.55 kg/m  as calculated from the following:    Height as of 2/15/24: 1.676 m (5' 5.98\").    Weight as of this encounter: 85.8 kg (189 lb 2.5 oz).                           "

## 2024-02-18 LAB
ATRIAL RATE - MUSE: 73 BPM
DIASTOLIC BLOOD PRESSURE - MUSE: NORMAL MMHG
INTERPRETATION ECG - MUSE: NORMAL
P AXIS - MUSE: 47 DEGREES
PR INTERVAL - MUSE: 188 MS
QRS DURATION - MUSE: 132 MS
QT - MUSE: 428 MS
QTC - MUSE: 471 MS
R AXIS - MUSE: 67 DEGREES
SYSTOLIC BLOOD PRESSURE - MUSE: NORMAL MMHG
T AXIS - MUSE: 39 DEGREES
VENTRICULAR RATE- MUSE: 73 BPM

## 2024-02-20 ENCOUNTER — OFFICE VISIT (OUTPATIENT)
Dept: ORTHOPEDICS | Facility: CLINIC | Age: 74
End: 2024-02-20
Payer: OTHER MISCELLANEOUS

## 2024-02-20 ENCOUNTER — VIRTUAL VISIT (OUTPATIENT)
Dept: INTERNAL MEDICINE | Facility: CLINIC | Age: 74
End: 2024-02-20
Payer: COMMERCIAL

## 2024-02-20 VITALS — SYSTOLIC BLOOD PRESSURE: 129 MMHG | DIASTOLIC BLOOD PRESSURE: 74 MMHG

## 2024-02-20 DIAGNOSIS — M48.062 SPINAL STENOSIS OF LUMBAR REGION WITH NEUROGENIC CLAUDICATION: ICD-10-CM

## 2024-02-20 DIAGNOSIS — S22.20XA CLOSED FRACTURE OF STERNUM, UNSPECIFIED PORTION OF STERNUM, INITIAL ENCOUNTER: ICD-10-CM

## 2024-02-20 DIAGNOSIS — I42.8 OTHER CARDIOMYOPATHY (H): ICD-10-CM

## 2024-02-20 DIAGNOSIS — Z09 HOSPITAL DISCHARGE FOLLOW-UP: Primary | ICD-10-CM

## 2024-02-20 DIAGNOSIS — M51.369 DDD (DEGENERATIVE DISC DISEASE), LUMBAR: ICD-10-CM

## 2024-02-20 DIAGNOSIS — M19.032 PRIMARY OSTEOARTHRITIS OF BOTH WRISTS: Primary | ICD-10-CM

## 2024-02-20 DIAGNOSIS — M19.031 PRIMARY OSTEOARTHRITIS OF BOTH WRISTS: Primary | ICD-10-CM

## 2024-02-20 PROCEDURE — 99495 TRANSJ CARE MGMT MOD F2F 14D: CPT | Performed by: INTERNAL MEDICINE

## 2024-02-20 PROCEDURE — 20605 DRAIN/INJ JOINT/BURSA W/O US: CPT | Mod: 50 | Performed by: STUDENT IN AN ORGANIZED HEALTH CARE EDUCATION/TRAINING PROGRAM

## 2024-02-20 PROCEDURE — 99214 OFFICE O/P EST MOD 30 MIN: CPT | Mod: 25 | Performed by: STUDENT IN AN ORGANIZED HEALTH CARE EDUCATION/TRAINING PROGRAM

## 2024-02-20 RX ORDER — LIDOCAINE HYDROCHLORIDE 10 MG/ML
1 INJECTION, SOLUTION INFILTRATION; PERINEURAL
Status: DISCONTINUED | OUTPATIENT
Start: 2024-02-20 | End: 2024-05-17

## 2024-02-20 RX ORDER — HYDROCODONE BITARTRATE AND ACETAMINOPHEN 5; 325 MG/1; MG/1
1 TABLET ORAL EVERY 6 HOURS PRN
Qty: 30 TABLET | Refills: 0 | Status: SHIPPED | OUTPATIENT
Start: 2024-02-20 | End: 2024-03-06

## 2024-02-20 RX ORDER — TRIAMCINOLONE ACETONIDE 40 MG/ML
40 INJECTION, SUSPENSION INTRA-ARTICULAR; INTRAMUSCULAR
Status: SHIPPED | OUTPATIENT
Start: 2024-02-20

## 2024-02-20 RX ADMIN — LIDOCAINE HYDROCHLORIDE 1 ML: 10 INJECTION, SOLUTION INFILTRATION; PERINEURAL at 20:46

## 2024-02-20 RX ADMIN — TRIAMCINOLONE ACETONIDE 40 MG: 40 INJECTION, SUSPENSION INTRA-ARTICULAR; INTRAMUSCULAR at 20:46

## 2024-02-20 ASSESSMENT — PATIENT HEALTH QUESTIONNAIRE - PHQ9
SUM OF ALL RESPONSES TO PHQ QUESTIONS 1-9: 6
SUM OF ALL RESPONSES TO PHQ QUESTIONS 1-9: 6
10. IF YOU CHECKED OFF ANY PROBLEMS, HOW DIFFICULT HAVE THESE PROBLEMS MADE IT FOR YOU TO DO YOUR WORK, TAKE CARE OF THINGS AT HOME, OR GET ALONG WITH OTHER PEOPLE: SOMEWHAT DIFFICULT

## 2024-02-20 NOTE — PROGRESS NOTES
Orthopaedic Surgery Hand Clinic Progress Note    Patient Name: Nadira Perez  MRN: [id number]  : 1950  Date: 2024    Date of Surgery: 21    Surgery Performed: 1) Open right carpal tunnel release  2) Removal of buried implant (deep) right wrist    Subjective:     24: Patient was last seen 23. She received bilateral radiocarpal joint injections at her last visit and said the injections took 2-3 weeks to set in. They provided relief until pain returned 2 weeks ago. Her left is more bothersome than right. She is interested in repeating injections today and would like a topical prescribed as well. Patient had a recent fall and fractured her sternum.    23: Patient was last seen 23. She received left radiocarpal injection at her last visit that provided relief for about 2 months. She would like both wrists injected today. She states the cramping of her fingers has improved.     23: Patient was last seen 23. She had ultrasound completed 6/15/23 and is here to review results today. Since last visit she reports continued spasms and cramping of the fingers in her left hand. She continues to have cramping/locking of her fingers with certain postures like putting on earrings or her bra. Seems to most affected the ulnar sided digits. She does not have classic pain or paresthesias in the median nerve distribution. No nocturnal symptoms. She has been wearing a wrist brace, using Tylenol and gabapentin.  She is interested in repeating the left radiocarpal  injection today.    23: Patient was last seen 23 at which time I provided bilateral radiocarpal injections for wrist OA and left middle finger trigger injection. She is still having relief from all three injections. MF triggering seems to have resolved. Main concern today is cramping of the fingers. Ongoing 6 months, worse over the last 2. She states the thumb, index, middle, and ring fingers seem to lock up with  "certain hand postures like when putting on earrings or wearing her bra. She does not have classic pain or paresthesias in the median nerve distribution. No nocturnal symptoms. She has been wearing a wrist brace, using Tylenol and gabapentin.    Update 2/14/2023 Patient last seen on 8/9/22. At which time patient had cortisone injection for A1 pulley of left ring finger and bilateral radiocarpal injections. She states these are quite effective for a while after the injections. Patient was to continue wearing  She returns today for repeat radiocarpal injections. She has developed dorsal radial swelling over the carpus more pronounced on the right. She has developed locking/catching of the left long finger as well.    8/9/22: Patient was last seen 5/3/22 at which time I provided her with bilateral radiocarpal injections and new braces. She notes since last visit she sustained a fall around 5/4, and sustained nondisplaced fractures to left ring finger and right thumb for which she was treated by Dr. Miguel. She has healed fine from those, states thumb IP still a bit stiff. She has continued to have bilateral wrist pain and notes new onset of \"spasms\" in left hand with gripping/ grasping objections within the last 2-3 weeks. She states that her left small and ring fingers seem to lock and spasm with /finger flexion. She has to forcibly extend it sometimes to make them straight with pain. She states this must have happened 6-7 times over the last 2 months.      Objective:  There were no vitals taken for this visit.    General: alert, appropriate, NAD  HEENT: NC/AT  CV: RRR by pulse  Pulm: Unlabored on RA  MSK:  BUE   Volar and dorsal incisions c/d/i, no e/o infection  No tenderness of the right thumb  Moderate pain to palpation dorsal central wrists bilaterally  Mild effusion and capsular swelling over bilateral dorsal radial wrist over the carpus  There is mild crepitus felt over the small finger with " flexion/extension - seems to be associated with the extensor tendon but it is variably reproducible  ROM baseline  Intact EPL/FPL/APB/HI  Diminished sensation median nerve distribution baseline  Negative Tinel's at the left carpal tunnel, no crepitus, there is fullness of the volar wrist proximal to carpal tunnel likely consistent with flexor tenosynovitis  +TTP scaphoid tubercle where an osteophyte is felt.  WWP CR< 2s    Imaging:  None new. XRs of left ring finger and right hand from 5/4/2022 and 5/31/2022 reviewed.  These demonstrate nondisplaced fractures of the right thumb distal phalanx and left ring distal phalanx that have healed.      Repeat XRs of bilateral wrists 2/14/23 demonstrates Unchanged alignment and appearance of 4 corner fusion on the left with ulnar subluxation of the carpus. Significant volar carpal osteophytes. 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     EMG/NCS 10/21/21  severe right median neuropathy at the wrist (carpal tunnel syndrome); no significant findings median or ulnar nerve on the left     Ultrasound 6/15/23  Impression:      1. No sonographic evidence of left carpal tunnel syndrome.     2. Tenosynovitis of the flexor carpi radialis.     3. Normal sonographic appearance of flexor tendon motion within the carpal tunnel.    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 Right s/p hardware removal for second failed 4 corner fusion, and carpal tunnel release.    Patient's left hand cramping has returned intermittently. It is not consistent with carpal tunnel syndrome. EMG/NCS 10/21/21 also negative on the left. Ultrasound did not show any mechanical impingement.    She wishes to have bilateral radiocarpal injection today.    After obtaining written consent, I cleansed the skin over the dorsal wrists  with chlorhexidine.  I then anesthetized skin with ethyl chloride freeze spray after which I injected 1 cc of 1% lidocaine and  40 mg of Kenalog into the bilateral radiocarpal joint through the 3-4 portal.  The patient tolerated this well without complication.    Continue brace wear. Continue radiocarpal injections every 3-4 months for symptomatic control, until she decides that she wants to proceed with total wrist fusions.    Voltaren gel sent to pharmacy.    Medium Joint Injection/Arthrocentesis: bilateral radiocarpal    Date/Time: 2/20/2024 8:46 PM    Performed by: Rolan Conley MD  Authorized by: Rolan Conley MD    Indications:  Pain  Needle Size:  25 G  Guidance: surface landmarks    Approach:  Dorsal  Location:  Wrist  Laterality:  Bilateral  Site:  Bilateral radiocarpal  Medications (Right):  40 mg triamcinolone 40 MG/ML  Medications (Left):  1 mL lidocaine 1 %        Rolan Conley MD    Hand, Upper Extremity & Microvascular Surgery  Department of Orthopedic Surgery  [facility]

## 2024-02-20 NOTE — LETTER
2024         RE: Nadira Perez  [address]  [place] [facility] [id number]        Dear Colleague,    Thank you for referring your patient, Nadira Perez, to the [facility] [place]. Please see a copy of my visit note below.    Orthopaedic Surgery Hand Clinic Progress Note    Patient Name: Nadira Perez  MRN: [id number]  : 1950  Date: 2024    Date of Surgery: 21    Surgery Performed: 1) Open right carpal tunnel release  2) Removal of buried implant (deep) right wrist    Subjective:     24: Patient was last seen 23. She received bilateral radiocarpal joint injections at her last visit and said the injections took 2-3 weeks to set in. They provided relief until pain returned 2 weeks ago. Her left is more bothersome than right. She is interested in repeating injections today and would like a topical prescribed as well. Patient had a recent fall and fractured her sternum.    23: Patient was last seen 23. She received left radiocarpal injection at her last visit that provided relief for about 2 months. She would like both wrists injected today. She states the cramping of her fingers has improved.     23: Patient was last seen 23. She had ultrasound completed 6/15/23 and is here to review results today. Since last visit she reports continued spasms and cramping of the fingers in her left hand. She continues to have cramping/locking of her fingers with certain postures like putting on earrings or her bra. Seems to most affected the ulnar sided digits. She does not have classic pain or paresthesias in the median nerve distribution. No nocturnal symptoms. She has been wearing a wrist brace, using Tylenol and gabapentin.  She is interested in repeating the left radiocarpal  injection today.    23: Patient was last seen 23 at which time I provided bilateral radiocarpal injections for wrist OA and left middle finger  "trigger injection. She is still having relief from all three injections. MF triggering seems to have resolved. Main concern today is cramping of the fingers. Ongoing 6 months, worse over the last 2. She states the thumb, index, middle, and ring fingers seem to lock up with certain hand postures like when putting on earrings or wearing her bra. She does not have classic pain or paresthesias in the median nerve distribution. No nocturnal symptoms. She has been wearing a wrist brace, using Tylenol and gabapentin.    Update 2/14/2023 Patient last seen on 8/9/22. At which time patient had cortisone injection for A1 pulley of left ring finger and bilateral radiocarpal injections. She states these are quite effective for a while after the injections. Patient was to continue wearing  She returns today for repeat radiocarpal injections. She has developed dorsal radial swelling over the carpus more pronounced on the right. She has developed locking/catching of the left long finger as well.    8/9/22: Patient was last seen 5/3/22 at which time I provided her with bilateral radiocarpal injections and new braces. She notes since last visit she sustained a fall around 5/4, and sustained nondisplaced fractures to left ring finger and right thumb for which she was treated by Dr. Miguel. She has healed fine from those, states thumb IP still a bit stiff. She has continued to have bilateral wrist pain and notes new onset of \"spasms\" in left hand with gripping/ grasping objections within the last 2-3 weeks. She states that her left small and ring fingers seem to lock and spasm with /finger flexion. She has to forcibly extend it sometimes to make them straight with pain. She states this must have happened 6-7 times over the last 2 months.      Objective:  There were no vitals taken for this visit.    General: alert, appropriate, NAD  HEENT: NC/AT  CV: RRR by pulse  Pulm: Unlabored on RA  MSK:  BUE   Volar and dorsal incisions c/d/i, " no e/o infection  No tenderness of the right thumb  Moderate pain to palpation dorsal central wrists bilaterally  Mild effusion and capsular swelling over bilateral dorsal radial wrist over the carpus  There is mild crepitus felt over the small finger with flexion/extension - seems to be associated with the extensor tendon but it is variably reproducible  ROM baseline  Intact EPL/FPL/APB/HI  Diminished sensation median nerve distribution baseline  Negative Tinel's at the left carpal tunnel, no crepitus, there is fullness of the volar wrist proximal to carpal tunnel likely consistent with flexor tenosynovitis  +TTP scaphoid tubercle where an osteophyte is felt.  WWP CR< 2s    Imaging:  None new. XRs of left ring finger and right hand from 5/4/2022 and 5/31/2022 reviewed.  These demonstrate nondisplaced fractures of the right thumb distal phalanx and left ring distal phalanx that have healed.      Repeat XRs of bilateral wrists 2/14/23 demonstrates Unchanged alignment and appearance of 4 corner fusion on the left with ulnar subluxation of the carpus. Significant volar carpal osteophytes. 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     EMG/NCS 10/21/21  severe right median neuropathy at the wrist (carpal tunnel syndrome); no significant findings median or ulnar nerve on the left     Ultrasound 6/15/23  Impression:      1. No sonographic evidence of left carpal tunnel syndrome.     2. Tenosynovitis of the flexor carpi radialis.     3. Normal sonographic appearance of flexor tendon motion within the carpal tunnel.    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 Right s/p hardware removal for second failed 4 corner fusion, and carpal tunnel release.    Patient's left hand cramping has returned intermittently. It is not consistent with carpal tunnel syndrome. EMG/NCS 10/21/21 also negative on the left. Ultrasound did not show any mechanical impingement.    She  wishes to have bilateral radiocarpal injection today.    After obtaining written consent, I cleansed the skin over the dorsal wrists  with chlorhexidine.  I then anesthetized skin with ethyl chloride freeze spray after which I injected 1 cc of 1% lidocaine and 40 mg of Kenalog into the bilateral radiocarpal joint through the 3-4 portal.  The patient tolerated this well without complication.    Continue brace wear. Continue radiocarpal injections every 3-4 months for symptomatic control, until she decides that she wants to proceed with total wrist fusions.    Voltaren gel sent to pharmacy.    Medium Joint Injection/Arthrocentesis: bilateral radiocarpal    Date/Time: 2/20/2024 8:46 PM    Performed by: Rolan Conley MD  Authorized by: Rolan Conley MD    Indications:  Pain  Needle Size:  25 G  Guidance: surface landmarks    Approach:  Dorsal  Location:  Wrist  Laterality:  Bilateral  Site:  Bilateral radiocarpal  Medications (Right):  40 mg triamcinolone 40 MG/ML  Medications (Left):  1 mL lidocaine 1 %        Rolan Conley MD    Hand, Upper Extremity & Microvascular Surgery  Department of Orthopedic Surgery  [facility]                Again, thank you for allowing me to participate in the care of your patient.        Sincerely,        Rolan Conley MD

## 2024-02-20 NOTE — PROGRESS NOTES
Ismael is a 73 year old who is being evaluated via a billable video visit.      How would you like to obtain your AVS? Mail a copy  If the video visit is dropped, the invitation should be resent by: Text to cell phone: [phone]  Will anyone else be joining your video visit? No          This is a VIDEO ( using Doximity)  encounter with the patient.       Location of the provider : office   Location of the patient : home      07:39 --- 08:03          Dr Mac's note      Patient's instructions / PLAN:                                                        Plan:  Norco refills  2. F/up w pcp -- my chart msg about Norco sent to pcp       ASSESSMENT & PLAN:                                                      This patient was scheduled inadequately as a new patient to me for hospital discharge follow     (Z09) Hospital discharge follow-up  (primary encounter diagnosis)  Comment:   Plan:     (S22.20XA) Closed fracture of sternum, unspecified portion of sternum, initial encounter  Comment:   Plan: Norco     (M48.062) Spinal stenosis of lumbar region with neurogenic claudication  (M51.36) DDD (degenerative disc disease), lumbar  Comment:   Plan: HYDROcodone-acetaminophen (NORCO) 5-325 MG         tablet            (I42.8) Other cardiomyopathy (H)  Comment:   Plan: f/up w cardio        Chief complaint:                                                      Hospital f/up     SUBJECTIVE:                                                    History of present illness:    Hospital discharge summary review     She has been using Norco 10 tabs a month for chr back pain.  She had used the monthly supply because of the recent sternum fx  She run out of hydrocodone -- she has been using it 1 tab every 6 hours.       Subjective   Ismael is a 73 year old, presenting for the following health issues:  Hospital F/U and Refill Request (Pain medication.)      2/20/2024     6:54 AM   Additional Questions   Roomed by Radha Miranda     HPI        Hospital Follow-up Visit:    Hospital/Nursing Home/IP Rehab Facility: [facility]  Date of Admission: 02-  Date of Discharge: 02-  Reason(s) for Admission: Hospital Problem List      Abnormal electrocardiogram    Ejection fraction < 50%    Closed fracture of sternum, unspecified portion of sternum, initial encounter    Was your hospitalization related to COVID-19? No   Problems taking medications regularly:  None  Medication changes since discharge:        START taking these medications     Details   !! HYDROcodone-acetaminophen (NORCO) 5-325 MG tablet Take 1 tablet by mouth every 6 hours as needed for severe pain  Qty: 10 tablet, Refills: 0     Associated Diagnoses: Closed fracture of sternum, unspecified portion of sternum, initial encounter        !! - Potential duplicate medications found. Please discuss with provider.         Problems adhering to non-medication therapy:  None    Summary of hospitalization:  [facility] discharge summary reviewed  Diagnostic Tests/Treatments reviewed.  Follow up needed: as above   Other Healthcare Providers Involved in Patient s Care:          cardio   Update since discharge: stable.         Plan of care communicated with patient         Review of Systems:                                                      ROS: negative for fever, chills, cough, wheezes,  shortness of breath, vomiting, abdominal pain, leg swelling Pos for CP         OBJECTIVE:           An actual physical exam can't be done during phone visit   A limited exam can sometimes be performed by video visit   NAD      PMHx: reviewed  Past Medical History:   Diagnosis Date    Arthritis     Bone disease     Breast cancer (H)     Diabetes (H)     H/O kyphoplasty     Hearing problem     History of blood transfusion     History of kidney stones     History of radiation therapy     Hyperlipemia     Hypertension     Hypopotassemia     Irregular heart beat     Kidney  problem     Lymph edema     Medullary sponge kidney     Osteopenia     PONV (postoperative nausea and vomiting)     Reduced vision     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    Thyroid disease       PSHx: reviewed  Past Surgical History:   Procedure Laterality Date    ABDOMEN SURGERY      ovarian cyst, mesh    ARTHRODESIS WRIST Right     ARTHRODESIS WRIST  02/14/2013    Procedure: ARTHRODESIS WRIST;  left wrist scaphoid excision, four bone fusion, iliac crest bone graft  ( Mac with block);  Surgeon: Av Mendez MD;  [facility]    BIOPSY      skin, vaginal    BLEPHAROPLASTY BILATERAL Bilateral 05/06/2022    Procedure: UPPER BLEPHAROPLASTY, BILATERAL;  Surgeon: Jemal Sanchez MD;  Location: [facility] OR    CATARACT IOL, RT/LT Right 03/13/2018    CATARACT IOL, RT/LT Left 02/20/2018    COLONOSCOPY  12/24/2013    Procedure: COMBINED COLONOSCOPY, SINGLE BIOPSY/POLYPECTOMY BY BIOPSY;  COLONOSCOPY;  Surgeon: Dom Alvarez MD;  Location: [facility]    COSMETIC BLEPHAROPLASTY LOWER LIDS BILATERAL Bilateral 05/06/2022    Procedure: BLEPHAROPLASTY, LOWER EYELID, BILATERAL, COSMETIC;  Surgeon: Jemal Sanchez MD;  Location: [facility] OR    COSMETIC SURGERY      ESOPHAGOSCOPY, GASTROSCOPY, DUODENOSCOPY (EGD), COMBINED N/A 11/23/2016    Procedure: COMBINED ESOPHAGOSCOPY, GASTROSCOPY, DUODENOSCOPY (EGD);  Surgeon: Quinten Feliciano MD;  Location:  GI    EXTERNAL EAR SURGERY      right    EYE SURGERY      radial keratomy    FUSION, SPINE, INTERBODY, OBLIQUE ANTERIOR AND LUMBAR, 2 LEVELS, POST APPROACH, USING OTS N/A 11/18/2021    Procedure: Part 1: Oblique Anterior Interbody Fusion at Lumbar 5 to sacral 1 with use of Bone Morphogenic Protein,;  Surgeon: Serge Ramirez MD;  Location:  OR    GI SURGERY      Umbilical hernia repair    GRAFT BONE FROM ILIAC CREST  02/14/2013    Procedure: GRAFT BONE FROM ILIAC CREST;  mac with block and local infilitration;  Surgeon: Av Mendez MD;   [facility]    HC BREATH HYDROGEN TEST N/A 10/14/2016    Procedure: HYDROGEN BREATH TEST;  Surgeon: Cheri Barron MD;  Location: [facility] GI    HERNIA REPAIR      umbilical age 18 mos.    HYSTERECTOMY TOTAL ABDOMINAL  05/03/2000    INJECT EPIDURAL CAUDAL N/A 09/12/2023    Procedure: Caudal epidural steroid injection with fluoroscopy;  Surgeon: Natasha Umaña MD;  Location: [facility] OR    INJECT EPIDURAL CAUDAL N/A 1/2/2024    Procedure: INJECTION, EPIDURAL, CAUDAL;  Surgeon: Natasha Umaña MD;  Location: [facility] OR    INJECT EPIDURAL LUMBAR N/A 05/23/2023    Procedure: L3-4 interlaminar epidural steroid injection with fluoroscopy ( left> right);  Surgeon: Natasha Umaña MD;  Location: [facility] OR    INJECT JOINT SACROILIAC Left 04/27/2023    Procedure: Left sacroiliac joint steroid injection with fluoroscopy;  Surgeon: Natasha Umaña MD;  Location: [facility] OR    MASTECTOMY MODIFIED RADICAL Bilateral     bilateral; right breast prophylactic    OPTICAL TRACKING SYSTEM FUSION POSTERIOR SPINE LUMBAR N/A 11/18/2021    Procedure: Open Posterior Instrumented Spinal Fusion at Lumbar 5 to Sacral 1, with grimm aguayo osteotomy, use of O-Arm/Stealth;  Surgeon: Serge Ramirez MD;  Location: [facility]    PARATHYROIDECTOMY  09/23/2004    R SUPERIOR    PARATHYROIDECTOMY  09/23/2004    parathyroid resection, subtotal    RELEASE CARPAL TUNNEL Right 12/02/2021    Procedure: RIGHT CARPAL TUNNEL RELEASE, RIGHT WRIST HARDWARE REMOVAL, RIGHT CARPAL BOSS EXCISION;  Surgeon: Rolan Conley MD;  Location: [facility] OR    REMOVE HARDWARE HAND  09/24/2013    Procedure: REMOVE HARDWARE HAND;  Left Hand Screw Removal       RHINOPLASTY  1968    thyr proc skin closed cosmetic manner by subcuticular stitch  01/23/2009    THYROPLASTY  10/09/2009    TONSILLECTOMY  1977    VITRECTOMY PARSPLANA WITH 25 GAUGE SYSTEM Left 06/20/2022    Procedure: LEFT EYE VITRECTOMY, PARS PLANA APPROACH, USING 25-GAUGE INSTRUMENTS, laser;  Surgeon: Domo Pizano,  MD Felix;  Location: [facility] OR    WRIST SURGERY      wrist arthrodesis        Meds: reviewed  Current Outpatient Medications   Medication Sig Dispense Refill    acetaminophen (TYLENOL) 500 MG tablet Take 1,000 mg by mouth 3 times daily      amLODIPine (NORVASC) 5 MG tablet Take 1 tablet (5 mg) by mouth daily 90 tablet 3    Ascorbic Acid (VITAMIN C) 500 MG CHEW Take 1 tablet by mouth daily      atorvastatin (LIPITOR) 80 MG tablet Take 1 tablet (80 mg) by mouth daily 90 tablet 3    Cholecalciferol (VITAMIN D3) 50 MCG (2000 UT) CAPS TAKE 3 CAPSULES (6,000 UNITS) BY MOUTH DAILY. 270 capsule 3    CRANBERRY EXTRACT PO Take 2 tablets by mouth daily      diclofenac (VOLTAREN) 1 % topical gel Apply topically daily as needed for moderate pain Apply to wrist      gabapentin (NEURONTIN) 300 MG capsule Take 4 capsules (1,200 mg) by mouth 3 times daily 1080 capsule 3    HEMP OIL OR EXTRACT OR OTHER CBD CANNABINOID, NOT MEDICAL CANNABIS, THC      HYDROcodone-acetaminophen (NORCO) 5-325 MG tablet Take 1 tablet by mouth every 6 hours as needed for severe pain 10 tablet 0    HYDROcodone-acetaminophen (NORCO) 5-325 MG tablet Take 1 tab for breakthrough pain 1-2 times a week. (Patient taking differently: Take 1-2 tablets by mouth daily as needed for breakthrough pain) 30 tablet 0    levothyroxine (SYNTHROID/LEVOTHROID) 112 MCG tablet TAKE 1/2 TAB BY MOUTH EVERY DAY 45 tablet 3    lisinopril (ZESTRIL) 40 MG tablet Take 1 tablet (40 mg) by mouth daily 90 tablet 3    magnesium 250 MG tablet Take 1 tablet by mouth every evening      Melatonin 10 MG TABS tablet Take 10 mg by mouth at bedtime      metoprolol succinate ER (TOPROL XL) 50 MG 24 hr tablet Take 1 tablet (50 mg) by mouth daily 90 tablet 3    Multiple Vitamin (MULTI-VITAMIN) per tablet Take 1 tablet by mouth daily      naloxone (NARCAN) 4 MG/0.1ML nasal spray Spray 1 spray (4 mg) into one nostril alternating nostrils once as needed for opioid reversal every 2-3 minutes until  assistance arrives 0.2 mL 0    ondansetron (ZOFRAN-ODT) 4 MG ODT tab Take 1 tablet (4 mg) by mouth every 12 hours as needed for nausea 180 tablet 3    spironolactone (ALDACTONE) 25 MG tablet Take 25 mg by mouth 2 times daily      tiZANidine (ZANAFLEX) 4 MG tablet Take 4 mg by mouth at bedtime         Soc Hx: reviewed  Fam Hx: reviewed        Chart documentation was completed, in part, with [employer] voice-recognition software. Even though reviewed, some grammatical, spelling, and word errors may remain.    Adelia Mac MD  Internal Medicine     Answers submitted by the patient for this visit:  Patient Health Questionnaire (Submitted on 2/20/2024)  If you checked off any problems, how difficult have these problems made it for you to do your work, take care of things at home, or get along with other people?: Somewhat difficult  PHQ9 TOTAL SCORE: 6

## 2024-02-21 NOTE — TELEPHONE ENCOUNTER
tiZANidine (ZANAFLEX) 4 MG tablet -- --  -- No   Sig - Route: Take 4 mg by mouth at bedtime - Oral   Class: Historical     Last Office Visit : 12/19/2023  [facility] [place]     Matilde Quiñonez APRN CNP     Future Office visit:    3/7/2024 Status: Jess    Arrive By: 1:15 PM     Appointment Time: 1:30 PM Length: 30   Visit Type: UMP RETURN [[id number]] VALENTIN:     Provider: Matilde Quiñonez APRN CNP Department: [facility] INTERNAL MEDICINE       Routing refill request to provider for review/approval because:  Drug not on the [facility], P or [facility] refill protocol

## 2024-03-01 ENCOUNTER — TELEPHONE (OUTPATIENT)
Dept: GASTROENTEROLOGY | Facility: CLINIC | Age: 74
End: 2024-03-01
Payer: COMMERCIAL

## 2024-03-01 DIAGNOSIS — Z12.11 ENCOUNTER FOR SCREENING COLONOSCOPY: Primary | ICD-10-CM

## 2024-03-01 NOTE — TELEPHONE ENCOUNTER
Message sent to anesthesia for review:  This pt is scheduled for a double on 3/12/2024. On 2/16/2024 pt was admitted for new Cardiomyopathy with reduced EF of 35%. Pt was to follow up with Cardiology. Her appt to follow up is scheduled for 3/13/2024. Would you please review and advise if you think pt should be rescheduled after her cardiology OV?     Per : She really should be rescheduled after a repeat Echo.

## 2024-03-01 NOTE — TELEPHONE ENCOUNTER
Writer called pt and advised that pt should follow up with Cardiology as anesthesia feels she may need a repeat echocardiogram. 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 She feels her hiatal hernia symptoms are worsening. Pt is going to call cardiology and attempt to get in before her procedures. Pt is understanding that she may need to reschedule and will call back.  Sharonda Cheek RN

## 2024-03-04 ENCOUNTER — PATIENT OUTREACH (OUTPATIENT)
Dept: CARE COORDINATION | Facility: CLINIC | Age: 74
End: 2024-03-04
Payer: COMMERCIAL

## 2024-03-04 ASSESSMENT — SLEEP AND FATIGUE QUESTIONNAIRES
HOW LIKELY ARE YOU TO NOD OFF OR FALL ASLEEP WHEN YOU ARE A PASSENGER IN A CAR FOR AN HOUR WITHOUT A BREAK: SLIGHT CHANCE OF DOZING
HOW LIKELY ARE YOU TO NOD OFF OR FALL ASLEEP WHILE SITTING QUIETLY AFTER LUNCH WITHOUT ALCOHOL: WOULD NEVER DOZE
HOW LIKELY ARE YOU TO NOD OFF OR FALL ASLEEP IN A CAR, WHILE STOPPED FOR A FEW MINUTES IN TRAFFIC: WOULD NEVER DOZE
HOW LIKELY ARE YOU TO NOD OFF OR FALL ASLEEP WHILE WATCHING TV: SLIGHT CHANCE OF DOZING
HOW LIKELY ARE YOU TO NOD OFF OR FALL ASLEEP WHILE SITTING AND TALKING TO SOMEONE: WOULD NEVER DOZE
HOW LIKELY ARE YOU TO NOD OFF OR FALL ASLEEP WHILE SITTING INACTIVE IN A PUBLIC PLACE: WOULD NEVER DOZE
HOW LIKELY ARE YOU TO NOD OFF OR FALL ASLEEP WHILE SITTING AND READING: SLIGHT CHANCE OF DOZING
HOW LIKELY ARE YOU TO NOD OFF OR FALL ASLEEP WHILE LYING DOWN TO REST IN THE AFTERNOON WHEN CIRCUMSTANCES PERMIT: MODERATE CHANCE OF DOZING

## 2024-03-04 NOTE — PROGRESS NOTES
Clinic Care Coordination Contact    Situation: Patient chart reviewed by care coordinator.    Background: Patient identified via recently discharged.     Assessment: Per chart review, patient had hospital follow up visit with video provider on 2/20/24.     Plan/Recommendations: Program closed at this time. Patient also has upcoming with PCP on 3/7.     GRZEGORZ BRAVO RN on 3/4/2024 at 2:57 PM     <-- Click to add NO pertinent Family History

## 2024-03-05 ENCOUNTER — OFFICE VISIT (OUTPATIENT)
Dept: SLEEP MEDICINE | Facility: CLINIC | Age: 74
End: 2024-03-05
Attending: NURSE PRACTITIONER
Payer: COMMERCIAL

## 2024-03-05 VITALS
HEIGHT: 66 IN | WEIGHT: 193.7 LBS | DIASTOLIC BLOOD PRESSURE: 80 MMHG | HEART RATE: 72 BPM | OXYGEN SATURATION: 96 % | RESPIRATION RATE: 16 BRPM | BODY MASS INDEX: 31.13 KG/M2 | SYSTOLIC BLOOD PRESSURE: 128 MMHG

## 2024-03-05 DIAGNOSIS — R06.83 SNORING: ICD-10-CM

## 2024-03-05 DIAGNOSIS — F51.04 CHRONIC INSOMNIA: ICD-10-CM

## 2024-03-05 DIAGNOSIS — G47.9 SLEEP DISTURBANCE: Primary | ICD-10-CM

## 2024-03-05 DIAGNOSIS — E66.811 OBESITY (BMI 30.0-34.9): ICD-10-CM

## 2024-03-05 DIAGNOSIS — I51.81 STRESS-INDUCED CARDIOMYOPATHY: ICD-10-CM

## 2024-03-05 DIAGNOSIS — I49.3 PVC (PREMATURE VENTRICULAR CONTRACTION): ICD-10-CM

## 2024-03-05 DIAGNOSIS — Z72.821 INADEQUATE SLEEP HYGIENE: ICD-10-CM

## 2024-03-05 PROCEDURE — 99205 OFFICE O/P NEW HI 60 MIN: CPT | Mod: GC | Performed by: INTERNAL MEDICINE

## 2024-03-05 RX ORDER — BIOTIN 1 MG
1000 TABLET ORAL DAILY
COMMUNITY
End: 2024-03-06

## 2024-03-05 NOTE — PROGRESS NOTES
Outpatient Sleep Medicine Consultation:      Name: Nadira Perez MRN# [id number]   Age: 73 year old YOB: 1950     Date of Consultation: March 5, 2024  Consultation is requested by: MERCEDES Rivera CNP  [address] [facility] [address]  [place] [address] Matilde Quiñonez  Primary care provider: Matilde Quiñonez       Reason for Sleep Consult:     Nadira Perez is sent by Matilde Quiñonez for a sleep consultation regarding snoring.    Patient s Reason for visit  Nadira Perez main reason for visit: Concern re sleep apnea  Patient states problem(s) started: Have been snoring for years.  Unknown if apnea occurred since I sleep alone.  Nadira Perez's goals for this visit: Review a plan to evaluate if I have sleep apnea.           Assessment and Plan:     Nadira Perez is a 73 year old woman who is seen today for evaluation for sleep disordered breathing given history of snoring. 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, chemotherapy and radiation, diet controlled DM2, osteopenia, 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, hypothyroidism, lumbar spinal stenosis and DDD, s/p spinal fusion of L5-S1 resulting in right foot drop (2021) and osteoarthritis      Summary Recommendations:  - Split night polysomnography testing for evaluation for sleep disordered breathing   -Discussed pathophysiology and risks of untreated LOLY   -Discussed treatment options  "including PAP therapy if study shows LOLY which patient is amenable to   - Follow up 2 weeks after the sleep study to discuss results per pt request  - Follow up 3 months after the study if LOLY to review therapy compliance   - For insomnia, strongly recommend that patient avoid use of doxylamine and melatonin together which can increase the risk for cognitive impairment and falls given the patient's age.  Also, recommend reducing the dosage of melatonin from 10 mg to 5 mg nightly.  -For insomnia, also provided with information regarding online CBT-I  to review  -Inadequate sleep hygiene -patient was advised to  limit caffeine intake further, and to avoid consumption of caffeine 6 hours prior to bedtime.  Patient was also instructed to avoid activities such as reading;Eating;Watching TV;Using phone, computer, or tablet in bed    Orders Placed This Encounter   Procedures    Comprehensive Sleep Study       Summary Counseling:    Sleep Testing Reviewed  Obstructive Sleep Apnea Reviewed  Complications of Untreated Sleep Apnea Reviewed    Medical Decision-making:   Educational materials provided in instructions      CC: Matilde Quiñonez      The above note was dictated using voice recognition software. Although reviewed after completion, some word and grammatical error may remain . Please contact the author for any clarifications.    Patient was staffed with Dr.Pusalavidyasagar Stephanie Licona MD  Sleep Medicine Fellow    Attending: As the attending physician I was present with  Dr. Stephanie Licona during this clinic visit. I personally reviewed the meléndez aspects of the history, chart review and discussed the plan with the patient and I agree with the above documentation.     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     Of note, patient was recently hospitalized for a sternal fracture after a fall.  During the hospitalization, she was also noted to have notable reduction in ejection fraction to 35%.  She has upcoming follow-up with cardiology for further evaluation and management.    Past Sleep Evaluations: None       SLEEP-WAKE SCHEDULE:     Work/School Days: Patient goes to school/work: Yes   Usually gets into bed at 11 PM  Takes patient about Less than one hour. to fall asleep  Has trouble falling asleep 2-3 nights/week nights per week due to pain from recent fracture  Wakes up in the middle of the night Every night, 1-2 times a night (able to empty bladder) times.  Wakes up due to Snorting self awake;Pain;Use the bathroom  She has trouble falling back asleep 1-2 nights/week times a week.   It usually takes Within 30 minutes, usually to get back to sleep  Patient is usually up at 7 AM (semi-retired)  Uses alarm: Yes    Weekends/Non-work Days/All Other Days:  Usually gets into bed at 11 PM   Takes patient about One hour or less to fall asleep  Patient is usually up at 7 AM  Uses alarm: No    Sleep Need  Patient gets  6 hours/night sleep on average   Patient thinks she needs about 6 hours/night sleep    Nadira Perez prefers to sleep in this position(s): Side   Patient states they do the following activities in bed: Read;Eat;Watch TV;Use phone,  computer, or tablet    Naps  Patient takes a purposeful nap 1-2 days/week if at all times a week and naps are usually One hour or less in duration  She feels better after a nap: Yes  She dozes off unintentionally 0 days per week  Patient has had a driving accident or near-miss due to sleepiness/drowsiness: No      SLEEP DISRUPTIONS:    Breathing/Snoring  Patient snores:Yes  Other people complain about her snoring: No  Patient has been told she stops breathing in her sleep:No  She has issues with the following: Morning mouth dryness;Getting up to urinate more than once    Movement:  Patient gets pain, discomfort, with an urge to move:  Yes denies restless legs syndrome-symptoms  It happens when she is resting:  Yes due to discomfort from back pain  It happens more at night:  No  Patient has been told she kicks her legs at night:  No     Behaviors in Sleep:  Nadira Perez has experienced the following behaviors while sleeping:  N/A  She has experienced sudden muscle weakness during the day: No      Is there anything else you would like your sleep provider to know: I sleep alone.  When on trips, I'll ask my nurse friends to observe for sleep apnea.  Not noticed as of yet.    CAFFEINE AND OTHER SUBSTANCES:    Patient consumes caffeinated beverages per day:  3-5 diet sodas per day (down from 4-6)  Last caffeine use is usually: Varies from 4-6 pm  List of any prescribed or over the counter stimulants that patient takes: N/A  List of any prescribed or over the counter sleep medication patient takes: Melatonin 10 mg PO qHS prn; Doxylamine succinate 25 mg PO qHS PRN.  Patient also currently smokes marijuana nightly for pain and insomnia  List of previous sleep medications that patient has tried: Diazepam, Lorazepam, Hydroxyzine (all short term, usually post-op)  Patient drinks alcohol to help them sleep: No  Patient drinks alcohol near bedtime: No    Family History:  Patient has a family member been diagnosed with a  sleep disorder: Yes  Father had sleep apnea       SCALES:    EPWORTH SLEEPINESS SCALE         3/4/2024     3:16 PM    Holcomb Sleepiness Scale ( TRE [place]o  [id number]<br>ESS - [place]/English - Final version - 21 Nov 07 - [facility] [place].)   Sitting and reading Slight chance of dozing   Watching TV Slight chance of dozing   Sitting, inactive in a public place (e.g. a theatre or a meeting) Would never doze   As a passenger in a car for an hour without a break Slight chance of dozing   Lying down to rest in the afternoon when circumstances permit Moderate chance of dozing   Sitting and talking to someone Would never doze   Sitting quietly after a lunch without alcohol Would never doze   In a car, while stopped for a few minutes in traffic Would never doze   Holcomb Score (MC) 5   Holcomb Score (Sleep) 5         INSOMNIA SEVERITY INDEX (ASHLEY)          3/4/2024     2:58 PM   Insomnia Severity Index (ASHLEY)   Difficulty falling asleep 1   Difficulty staying asleep 2   Problems waking up too early 2   How SATISFIED/DISSATISFIED are you with your CURRENT sleep pattern? 3   How NOTICEABLE to others do you think your sleep problem is in terms of impairing the quality of your life? 1   How WORRIED/DISTRESSED are you about your current sleep problem? 1   To what extent do you consider your sleep problem to INTERFERE with your daily functioning (e.g. daytime fatigue, mood, ability to function at work/daily chores, concentration, memory, mood, etc.) CURRENTLY? 1   ASHLEY Total Score 11       Guidelines for Scoring/Interpretation:  Total score categories:  0-7 = No clinically significant insomnia   8-14 = Subthreshold insomnia   15-21 = Clinical insomnia (moderate severity)  22-28 = Clinical insomnia (severe)  Used via courtesy of www.myhealth.va.gov with permission from Hosea Lawrence PhD., [facility]      STOP BANG         3/4/2024     3:20 PM   STOP BANG Questionnaire (  2008, the American Society of Anesthesiologists,  "Inc. [employer]   1. Snoring - Do you snore loudly (louder than talking or loud enough to be heard through closed doors)? Yes   2. Tired - Do you often feel tired, fatigued, or sleepy during daytime? No   3. Observed - Has anyone observed you stop breathing during your sleep? No   4. Blood pressure - Do you have or are you being treated for high blood pressure? Yes   5. BMI - BMI more than 35 kg/m2? No   6. Age - Age over 50 yr old? Yes   7. Neck circumference - Neck circumference greater than 40 cm? No   8. Gender - Gender male? No   STOP BANG Score (MC): 3          GAD7        8/7/2023    10:34 AM   DIANNE-7    1. Feeling nervous, anxious, or on edge 0   2. Not being able to stop or control worrying 0   3. Worrying too much about different things 0   4. Trouble relaxing 1   5. Being so restless that it is hard to sit still 1   6. Becoming easily annoyed or irritable 0   7. Feeling afraid, as if something awful might happen 0   DIANNE-7 Total Score 2   If you checked any problems, how difficult have they made it for you to do your work, take care of things at home, or get along with other people? Not difficult at all         CAGE-AID         No data to display                CAGE-AID reprinted with permission from the [place] Medical Journal, LALA Ruffin. and ALVARO Anglin, \"Conjoint screening questionnaires for alcohol and drug abuse\" [place] Medical Journal 94: 135-140, 1995.      PATIENT HEALTH QUESTIONNAIRE-9 (PHQ - 9)        2/20/2024     7:30 AM   PHQ-9 ([employer])   1.  Little interest or pleasure in doing things 1   2.  Feeling down, depressed, or hopeless 0   3.  Trouble falling or staying asleep, or sleeping too much 1   4.  Feeling tired or having little energy 1   5.  Poor appetite or overeating 1   6.  Feeling bad about yourself - or that you are a failure or have let yourself or your family down 0   7.  Trouble concentrating on things, such as reading the newspaper or watching " television 1   8.  Moving or speaking so slowly that other people could have noticed. Or the opposite - being so fidgety or restless that you have been moving around a lot more than usual 1   9.  Thoughts that you would be better off dead, or of hurting yourself in some way 0   PHQ-9 Total Score 6   6.  Feeling bad about yourself 0   7.  Trouble concentrating 1   8.  Moving slowly or restless 1   9.  Suicidal or self-harm thoughts 0   1.  Little interest or pleasure in doing things Several days   2.  Feeling down, depressed, or hopeless Not at all   3.  Trouble falling or staying asleep, or sleeping too much Several days   4.  Feeling tired or having little energy Several days   5.  Poor appetite or overeating Several days   6.  Feeling bad about yourself Not at all   7.  Trouble concentrating Several days   8.  Moving slowly or restless Several days   9.  Suicidal or self-harm thoughts Not at all   PHQ-9 via [employer] TOTAL SCORE-----> 6 (Mild depression)   Difficulty at work, home, or with people Somewhat difficult       Developed by Isabel Funk, Ladonna Gutierrez, Tristan Holloway and colleagues, with an educational bashir from [employer]. No permission required to reproduce, translate, display or distribute.        Allergies:    Allergies   Allergen Reactions    Erythromycin Nausea    Penicillins Hives     Around age 6 - doesn't recall full reaction, mother told her it was hives.     Has tolerated cephalsporins.        Medications:    Current Outpatient Medications   Medication Sig Dispense Refill    acetaminophen (TYLENOL) 500 MG tablet Take 1,000 mg by mouth 3 times daily      amLODIPine (NORVASC) 5 MG tablet Take 1 tablet (5 mg) by mouth daily 90 tablet 3    Ascorbic Acid (VITAMIN C) 500 MG CHEW Take 1 tablet by mouth daily      atorvastatin (LIPITOR) 80 MG tablet Take 1 tablet (80 mg) by mouth daily 90 tablet 3    Cholecalciferol (VITAMIN D3) 50 MCG (2000 UT) CAPS TAKE 3 CAPSULES (6,000 UNITS) BY MOUTH  DAILY. 270 capsule 3    CRANBERRY EXTRACT PO Take 2 tablets by mouth daily      diclofenac (VOLTAREN) 1 % topical gel Apply 2 g topically 4 times daily 100 g 6    diclofenac (VOLTAREN) 1 % topical gel Apply topically daily as needed for moderate pain Apply to wrist      gabapentin (NEURONTIN) 300 MG capsule Take 4 capsules (1,200 mg) by mouth 3 times daily 1080 capsule 3    HEMP OIL OR EXTRACT OR OTHER CBD CANNABINOID, NOT MEDICAL CANNABIS, THC      HYDROcodone-acetaminophen (NORCO) 5-325 MG tablet Take 1 tablet by mouth every 6 hours as needed for severe pain -- pcp notified and further refills would be addressed by pcp 30 tablet 0    levothyroxine (SYNTHROID/LEVOTHROID) 112 MCG tablet TAKE 1/2 TAB BY MOUTH EVERY DAY 45 tablet 3    lisinopril (ZESTRIL) 40 MG tablet Take 1 tablet (40 mg) by mouth daily 90 tablet 3    magnesium 250 MG tablet Take 1 tablet by mouth every evening      Melatonin 10 MG TABS tablet Take 10 mg by mouth at bedtime      metoprolol succinate ER (TOPROL XL) 50 MG 24 hr tablet Take 1 tablet (50 mg) by mouth daily 90 tablet 3    Multiple Vitamin (MULTI-VITAMIN) per tablet Take 1 tablet by mouth daily      naloxone (NARCAN) 4 MG/0.1ML nasal spray Spray 1 spray (4 mg) into one nostril alternating nostrils once as needed for opioid reversal every 2-3 minutes until assistance arrives 0.2 mL 0    ondansetron (ZOFRAN-ODT) 4 MG ODT tab Take 1 tablet (4 mg) by mouth every 12 hours as needed for nausea 180 tablet 3    spironolactone (ALDACTONE) 25 MG tablet Take 25 mg by mouth 2 times daily      tiZANidine (ZANAFLEX) 4 MG tablet Take 1 tablet (4 mg) by mouth at bedtime 60 tablet 1       Problem List:  Patient Active Problem List    Diagnosis Date Noted    Abnormal electrocardiogram 02/16/2024     Priority: Medium    Ejection fraction < 50% 02/16/2024     Priority: Medium    Closed fracture of sternum, unspecified portion of sternum, initial encounter 02/16/2024     Priority: Medium    Diabetes mellitus,  type 2 (H) 12/19/2023     Priority: Medium    Lumbosacral radiculopathy 08/07/2023     Priority: Medium    Lumbar radiculopathy 05/19/2023     Priority: Medium     Added automatically from request for surgery [id number]      Sacroiliitis (H24) 04/20/2023     Priority: Medium     Added automatically from request for surgery [id number]      Closed nondisplaced fracture of lateral malleolus of left fibula, initial encounter 01/27/2023     Priority: Medium    Acute left ankle pain 01/27/2023     Priority: Medium    PVD (posterior vitreous detachment), left eye 05/09/2022     Priority: Medium     Added automatically from request for surgery [id number]      Vitreous opacities of left eye 05/09/2022     Priority: Medium     Added automatically from request for surgery [id number]      S/P spinal fusion 05/04/2022     Priority: Medium    Chronic bilateral low back pain 05/04/2022     Priority: Medium    Dermatochalasis of both upper eyelids 04/11/2022     Priority: Medium     Added automatically from request for surgery [id number]      Brain lesion 12/16/2021     Priority: Medium    Spinal stenosis of lumbar region with neurogenic claudication 11/18/2021     Priority: Medium    Spondylolisthesis of lumbar region 11/18/2021     Priority: Medium    Peripheral neuropathy 10/27/2021     Priority: Medium    Carpal tunnel syndrome of right wrist 10/21/2021     Priority: Medium     Added automatically from request for surgery [id number]      Degenerative scoliosis in adult patient 01/28/2020     Priority: Medium    DDD (degenerative disc disease), lumbar 10/22/2019     Priority: Medium    Chronic pain of right knee 12/27/2018     Priority: Medium    Osteopenia, unspecified location 11/21/2017     Priority: Medium    Aromatase inhibitor use 11/21/2017     Priority: Medium    Infiltrating ductal ca grade 2, ERpositive, PRpositive, HER2 negative by FISH 07/20/2016     Priority: Medium     TS completed 10.18. 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 Provider to review      Dermatitis 11/03/2013     Priority: Medium    Hypovitaminosis D 07/16/2013     Priority: Medium    Contact dermatitis and other eczema, due to unspecified cause 07/16/2013     Priority: Medium    Mechanical problems with limbs 03/25/2013     Priority: Medium    Osteoporosis 03/01/2013     Priority: Medium    Essential hypertension, benign 02/03/2013     Priority: Medium    Inflamed seborrheic keratosis 01/29/2013     Priority: Medium    Personal history of other malignant neoplasm of skin 12/23/2011     Priority: Medium    Hypopotassemia 11/08/2011     Priority: Medium    Hyperlipidemia 11/08/2011     Priority: Medium     Problem list name updated by automated process. Provider to review      Murmurs 11/08/2011     Priority: Medium    Nephrolithiasis 11/08/2011     Priority: Medium    Osteoarthrosis, hand 11/08/2011     Priority: Medium     Problem list name updated by automated process. Provider to review          Past Medical/Surgical History:  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    Thyroid disease      Past Surgical History:   Procedure Laterality Date    ABDOMEN SURGERY      ovarian cyst, mesh    ARTHRODESIS WRIST Right     ARTHRODESIS WRIST  02/14/2013    Procedure: ARTHRODESIS WRIST;  left wrist  scaphoid excision, four bone fusion, iliac crest bone graft  ( Mac with block);  Surgeon: Av Mendez MD;  [facility]: [facility]    BIOPSY      skin, vaginal    BLEPHAROPLASTY BILATERAL Bilateral 05/06/2022    Procedure: UPPER BLEPHAROPLASTY, BILATERAL;  Surgeon: Jemal Sanchez MD;  Location: [facility] OR    CATARACT IOL, RT/LT Right 03/13/2018    CATARACT IOL, RT/LT Left 02/20/2018    COLONOSCOPY  12/24/2013    Procedure: COMBINED COLONOSCOPY, SINGLE BIOPSY/POLYPECTOMY BY BIOPSY;  COLONOSCOPY;  Surgeon: Dom Alvarez MD;  Location:  GI    COSMETIC BLEPHAROPLASTY LOWER LIDS BILATERAL Bilateral 05/06/2022    Procedure: BLEPHAROPLASTY, LOWER EYELID, BILATERAL, COSMETIC;  Surgeon: Jemal Sanchez MD;  Location: [facility] OR    COSMETIC SURGERY      ESOPHAGOSCOPY, GASTROSCOPY, DUODENOSCOPY (EGD), COMBINED N/A 11/23/2016    Procedure: COMBINED ESOPHAGOSCOPY, GASTROSCOPY, DUODENOSCOPY (EGD);  Surgeon: Quinten Feliciano MD;  Location:  GI    EXTERNAL EAR SURGERY      right    EYE SURGERY      radial keratomy    FUSION, SPINE, INTERBODY, OBLIQUE ANTERIOR AND LUMBAR, 2 LEVELS, POST APPROACH, USING OTS N/A 11/18/2021    Procedure: Part 1: Oblique Anterior Interbody Fusion at Lumbar 5 to sacral 1 with use of Bone Morphogenic Protein,;  Surgeon: Serge Rmairez MD;  Location:  OR    GI SURGERY      Umbilical hernia repair    GRAFT BONE FROM ILIAC CREST  02/14/2013    Procedure: GRAFT BONE FROM ILIAC CREST;  mac with block and local infilitration;  Surgeon: Av Mendez MD;  Location:  OR    HC BREATH HYDROGEN TEST N/A 10/14/2016    Procedure: HYDROGEN BREATH TEST;  Surgeon: Cheri Barron MD;  Location:  GI    HERNIA REPAIR      umbilical age 18 mos.    HYSTERECTOMY TOTAL ABDOMINAL  05/03/2000    INJECT EPIDURAL CAUDAL N/A 09/12/2023    Procedure: Caudal epidural steroid injection with fluoroscopy;  Surgeon: Natasha Umaña MD;  Location: [facility] OR    INJECT EPIDURAL CAUDAL N/A  1/2/2024    Procedure: INJECTION, EPIDURAL, CAUDAL;  Surgeon: Natasha Umaña MD;  Location: [facility] OR    INJECT EPIDURAL LUMBAR N/A 05/23/2023    Procedure: L3-4 interlaminar epidural steroid injection with fluoroscopy ( left> right);  Surgeon: Natasha Umaña MD;  Location: [facility] OR    INJECT JOINT SACROILIAC Left 04/27/2023    Procedure: Left sacroiliac joint steroid injection with fluoroscopy;  Surgeon: Natasha Umaña MD;  Location: [facility] OR    MASTECTOMY MODIFIED RADICAL Bilateral     bilateral; right breast prophylactic    OPTICAL TRACKING SYSTEM FUSION POSTERIOR SPINE LUMBAR N/A 11/18/2021    Procedure: Open Posterior Instrumented Spinal Fusion at Lumbar 5 to Sacral 1, with grimm aguayo osteotomy, use of O-Arm/Stealth;  Surgeon: Serge Ramirez MD;  Location: [facility]    PARATHYROIDECTOMY  09/23/2004    R SUPERIOR    PARATHYROIDECTOMY  09/23/2004    parathyroid resection, subtotal    RELEASE CARPAL TUNNEL Right 12/02/2021    Procedure: RIGHT CARPAL TUNNEL RELEASE, RIGHT WRIST HARDWARE REMOVAL, RIGHT CARPAL BOSS EXCISION;  Surgeon: Rolan Conley MD;  Location: [facility] OR    REMOVE HARDWARE HAND  09/24/2013    Procedure: REMOVE HARDWARE HAND;  Left Hand Screw Removal       RHINOPLASTY  1968    thyr proc skin closed cosmetic manner by subcuticular stitch  01/23/2009    THYROPLASTY  10/09/2009    TONSILLECTOMY  1977    VITRECTOMY PARSPLANA WITH 25 GAUGE SYSTEM Left 06/20/2022    Procedure: LEFT EYE VITRECTOMY, PARS PLANA APPROACH, USING 25-GAUGE INSTRUMENTS, laser;  Surgeon: Felix Carlos MD;  Location: [facility] OR    WRIST SURGERY      wrist arthrodesis       Social History:  Social History     Socioeconomic History    Marital status:      Spouse name: Not on file    Number of children: Not on file    Years of education: Not on file    Highest education level: Not on file   Occupational History    Not on file   Tobacco Use    Smoking status: Never     Passive exposure: Never    Smokeless  tobacco: Never   Vaping Use    Vaping Use: Never used   Substance and Sexual Activity    Alcohol use: Not Currently     Alcohol/week: 7.0 standard drinks of alcohol     Types: 7 Glasses of wine per week     Comment: Rare if ever    Drug use: Yes     Types: Marijuana     Comment: Smoke through water filter for chronic back pain PRN.    Sexual activity: Not Currently     Partners: Male     Birth control/protection: Post-menopausal, Female Surgical, None   Other Topics Concern     Service No    Blood Transfusions Not Asked    Caffeine Concern No    Occupational Exposure No    Hobby Hazards No    Sleep Concern No    Stress Concern No    Weight Concern No    Special Diet No    Back Care No    Exercise Yes     Comment: walks 4-6x  week for 20-30 min. each    Bike Helmet Not Asked    Seat Belt Yes    Self-Exams Yes    Parent/sibling w/ CABG, MI or angioplasty before 65F 55M? Yes     Comment: Father   Social History Narrative    .  Recently retired. 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        She lives with a renter.         She exercises 50 minutes three times a week.     Social Determinants of Health     Financial Resource Strain: Low Risk  (12/19/2023)    Financial Resource Strain     Within the past 12 months, have you or your family members you live with been unable to get utilities (heat, electricity) when it was really needed?: No   Food Insecurity: Low Risk  (12/19/2023)    Food Insecurity     Within the past 12 months, did you worry that your food would run out before you got money to buy more?: No     Within the past 12 months, did the food you bought just not last and you didn t have money to get more?: No   Transportation Needs: Low Risk  (12/19/2023)    Transportation Needs     Within the past 12 months, has lack of transportation kept you from medical appointments, getting your medicines, non-medical meetings or  appointments, work, or from getting things that you need?: No   Physical Activity: Not on file   Stress: Not on file   Social Connections: Not on file   Interpersonal Safety: Low Risk  (11/30/2023)    Interpersonal Safety     Do you feel physically and emotionally safe where you currently live?: Yes     Within the past 12 months, have you been hit, slapped, kicked or otherwise physically hurt by someone?: No     Within the past 12 months, have you been humiliated or emotionally abused in other ways by your partner or ex-partner?: No   Housing Stability: Low Risk  (12/19/2023)    Housing Stability     Do you have housing? : Yes     Are you worried about losing your housing?: No       Family History:  Family History   Problem Relation Age of Onset    Neurologic Disorder Mother         Anuerysm of Cerebral Artery, Dementia    Diabetes Mother     Thyroid Disease Mother         Hyperthyroidism (goiter)    Cerebrovascular Disease Mother         Hemorrhagic    Dementia Mother     Osteoporosis Mother     Hyperlipidemia Mother     Heart Disease Father         AAA    Hypertension Father     Coronary Artery Disease Father     Hyperlipidemia Father     Mental Illness Father         Schizophrenia?    Dementia Brother         hydrocephalis    Circulatory Brother         Perihperal Neurophathy    Diabetes Maternal Grandmother         Presumed Type 2    Asthma Maternal Grandmother     Chronic Obstructive Pulmonary Disease Maternal Grandfather         father    Asthma Maternal Grandfather     Diabetes Maternal Aunt         x2    Melanoma Maternal Aunt     Glaucoma Maternal Aunt     Breast Cancer Cousin     Breast Cancer Cousin     Dementia Other     Cancer Other         malignant melanoma    Hypertension Other     Hypertension Other     Cerebrovascular Disease Other     Cerebrovascular Disease Other     Obesity Other     Respiratory Other     Cancer Other     Diabetes Other     Asthma Other     Other Cancer Other         Malignant  melanoma    Obesity Other     Macular Degeneration No family hx of     Kidney Disease No family hx of     Thrombosis No family hx of     Arthritis No family hx of     Depression No family hx of     Substance Abuse No family hx of     Cystic Fibrosis No family hx of     Early Death No family hx of     Coronary Artery Disease Early Onset No family hx of     Heart Failure No family hx of     Bleeding Diathesis No family hx of     Ovarian Cancer No family hx of     Uterine Cancer No family hx of     Prostate Cancer No family hx of     Colorectal Cancer No family hx of     Pancreatic Cancer No family hx of     Lung Cancer No family hx of     Autoimmune Disease No family hx of     Unknown/Adopted No family hx of     Genetic Disorder No family hx of     Bleeding Disorder No family hx of     Clotting Disorder No family hx of     Anesthesia Reaction No family hx of        Review of Systems:  A complete review of systems reviewed by me is negative with the exeption of what has been mentioned in the history of present illness.  In the last TWO WEEKS have you experienced any of the following symptoms?  Fevers: No  Night Sweats: No  Weight Gain: Yes  Pain at Night: Yes  Double Vision: No  Changes in Vision: No  Difficulty Breathing through Nose: No  Sore Throat in Morning: No  Dry Mouth in the Morning: Yes  Shortness of Breath Lying Flat: No  Shortness of Breath With Activity: No  Awakening with Shortness of Breath: No  Increased Cough: No  Heart Racing at Night: No  Swelling in Feet or Legs: Yes  Diarrhea at Night: No  Heartburn at Night: Yes  Urinating More than Once at Night: Yes  Losing Control of Urine at Night: No  Joint Pains at Night: Yes  Headaches in Morning: No  Weakness in Arms or Legs: Yes  Depressed Mood: No  Anxiety: No     Physical Examination:  Vitals: LMP  (LMP Unknown)   BMI= There is no height or weight on file to calculate BMI.    GENERAL APPEARANCE: healthy, alert, and no distress  EYES: Eyes grossly normal  "to inspection and conjunctivae and sclerae normal  HENT: nose and mouth without ulcers or lesions. Asymmetric soft palate  NECK: no adenopathy, no asymmetry, masses, or scars  Circumference: 16.5\"  RESP: Normal respiratory effort  CV: regular rates and rhythm.LE pitting edema  SKIN: no suspicious lesions or rashes  NEURO: mentation intact and speech normal  PSYCH: mentation appears normal and affect normal  Mallampati Class: II.  Tonsillar Stage: 0  surgically removed.         Data: All pertinent previous laboratory data reviewed     Recent Labs   Lab Test 02/17/24  0640 02/16/24  1022 02/16/24  1005     --  142   POTASSIUM 4.5  --  4.0   CHLORIDE 103  --  105   CO2 25  --  24   ANIONGAP 12  --  13   *  --  106*   BUN 24.1*  --  30.3*   CR 0.78 1.1* 0.93   RAMBO 9.3  --  10.1       Recent Labs   Lab Test 02/17/24  0640   WBC 10.3   RBC 4.08   HGB 11.9   HCT 37.3   MCV 91   MCH 29.2   MCHC 31.9   RDW 15.2*          Recent Labs   Lab Test 11/30/23  1218 10/27/22  1428   PROTTOTAL  --  7.1   ALBUMIN  --  4.6   BILITOTAL  --  0.2   ALKPHOS 91 96   AST 25 22   ALT 21 16       TSH   Date Value   12/04/2023 2.03 uIU/mL   11/30/2023 2.07 uIU/mL   01/12/2022 1.54 mU/L   08/26/2021 1.95 mU/L   11/11/2019 1.91 mU/L   11/13/2018 2.06 mU/L       Iron Sat Index   Date/Time Value Ref Range Status   11/05/2021 01:24 PM 24 15 - 46 % Final     Ferritin   Date/Time Value Ref Range Status   11/05/2021 01:24 PM 42 8 - 252 ng/mL Final       pH Arterial (no units)   Date Value   11/18/2021 7.39     pO2 Arterial (mm Hg)   Date Value   11/18/2021 124 (H)     pCO2 Arterial (mm Hg)   Date Value   11/18/2021 47 (H)     Bicarbonate Arterial (mmol/L)   Date Value   11/18/2021 29 (H)     Base Excess/Deficit Arterial (mmol/L)   Date Value   11/18/2021 2.9 (H)       Transthoracic echocardiogram 3/6/2024  Left ventricular function is decreased. The ejection fraction is 50-55%  (borderline).  The left ventricular function has " improved.     Transthoracic echocardiogram 2/16/2024  The left ventricle is mildly dilated.  The visual ejection fraction is estimated at 35%.  The right ventricle is normal in structure, function and size.  Aortic root dilatation is present.  TDS- fractured sternum. Fat pad vs. effusion(small)  Clinical correlation required The study was technically difficult. Compared to  prior study, changes are noted.     Transthoracic echocardiogram 12/22/2023:  Global and regional left ventricular function is normal with an EF of 55-60%.  Global right ventricular function is normal. The right ventricle is normal  size.  No significant valvular abnormalities were noted.  No pericardial effusion is present.     This study was compared with the study from 12/20/2013 . No significant  changes noted.     Zio patch 11/14/2023: 12% PVC, 2 episodes of nonsustained VT, 5 episodes of nonsustained SVT.       Chest x-ray:   XR Chest 2 Views 02/15/2024    Narrative  XR CHEST 2 VIEWS  2/15/2024 12:31 PM    HISTORY:  Malignant neoplasm of breast in female, estrogen receptor  positive, unspecified laterality, unspecified site of breast (H);  Malignant neoplasm of breast in female, estrogen receptor positive,  unspecified laterality, unspecified site of breast (H); Sternal pain;  Rib pain    COMPARISON: 1/25/2022 CT chest    TECHNIQUE: Upright PA and Lateral radiographs of the chest.    FINDINGS:  Cardiomediastinal silhouette is within normal limits. Trachea is  midline. No appreciable pleural effusion or pneumothorax. No focal  airspace opacities.    Upper abdomen is unremarkable. There are surgical staples present in  the left upper axilla. Mildly displaced sternal fracture with  surrounding edema at the inferior third segment, which corresponds to  the patient's pain.    Impression  IMPRESSION:  1. Mildly displaced sternal fracture at the inferior portion of the  sternum with minimal surrounding edema. This is compatible with the  patient's  history of falling and hurting her sternum approximately a  week ago per chart review.  2. No acute pulmonary consolidations.    I have personally reviewed the examination and initial interpretation  and I agree with the findings.    JAYESH RIVER MD      SYSTEM ID:  [id number]      Chest CT:   Chest CT w IV contrast only, TRAUMA / DISSECTION 02/16/2024    Narrative  CT CHEST WITH CONTRAST 2/16/2024 11:51 AM    HISTORY: blunt force chest injury with confirmed sternal fracture    COMPARISON: Chest radiograph 2/15/2024. CT chest 1/25/2022.    TECHNIQUE: Volumetric helical acquisition of CT images of the chest  from the clavicles to the kidneys were acquired after the  administration of 94 mL Isovue-370 IV contrast. Radiation dose for  this scan was reduced using automated exposure control, adjustment of  the mA and/or kV according to patient size, or iterative  reconstruction technique.    FINDINGS:    LUNGS AND PLEURA: Mild subsegmental atelectasis/scarring of the lung  bases. Stable subcentimeter pulmonary nodules including a 4 mm nodule  in the right upper lobe (series 4, image 63) and 2 mm nodule along the  right major fissure (series 4, image 146). No pleural effusion or  pneumothorax.    MEDIASTINUM/AXILLAE: No evidence for mediastinal hematoma. Normal  heart size without pericardial effusion. The contour of the thoracic  aorta is within normal limits. Mild ectasia of the ascending thoracic  aorta measuring up to 4 cm in diameter, similar. No central pulmonary  embolism.    CORONARY ARTERY CALCIFICATIONS: Moderate.    UPPER ABDOMEN: Unchanged left adrenal nodule, previously characterized  as an adrenal adenoma. Nonobstructing punctate calculus of the left  upper pole kidney. Renal cysts as previously characterized by  ultrasound. Parenchymal thinning of the right interpolar kidney.    MUSCULOSKELETAL: Acute mildly displaced fracture of the inferior  sternal body with mild surrounding inflammatory stranding  without  defined soft tissue hematoma. Remote compression fracture T11 with  similar height loss and bony retropulsion of the superior endplate.  Remote fracture of the left L2 pedicle.    Impression  IMPRESSION:  1.  Acute mildly displaced fracture of the inferior sternal body with  mild surrounding inflammatory stranding. No defined soft tissue or  mediastinal hematoma.  2.  No additional acute findings within the chest.  3.  Unchanged chronic findings as above.    ERVIN PEREZ MD      SYSTEM ID:  [id number]      Stephanie Licona MD 3/5/2024

## 2024-03-05 NOTE — TELEPHONE ENCOUNTER
Writer called pt who states she will be seeing cardiology tomorrow and is hoping she gets the clearance from them to proceed with EGD/colonoscopy.    Writer will PVP chart. Sharonda Cheek RN

## 2024-03-05 NOTE — PATIENT INSTRUCTIONS
"          MY TREATMENT INFORMATION FOR SLEEP APNEA-  Nadira Perez    DOCTOR : Nathan Marquez MD    Am I having a sleep study at a sleep center?  --->Due to normal delays, you will be contacted within 2-4 weeks to schedule      Frequently asked questions:  1. What is Obstructive Sleep Apnea (LOLY)? LOLY is the most common type of sleep apnea. Apnea means, \"without breath.\"  Apnea is most often caused by narrowing or collapse of the upper airway as muscles relax during sleep.   Almost everyone has occasional apneas. Most people with sleep apnea have had brief interruptions at night frequently for many years.  The severity of sleep apnea is related to how frequent and severe the events are.   2. What are the consequences of LOLY? Symptoms include: feeling sleepy during the day, snoring loudly, gasping or stopping of breathing, trouble sleeping, and occasionally morning headaches or heartburn at night.  Sleepiness can be serious and even increase the risk of falling asleep while driving. Other health consequences may include development of high blood pressure and other cardiovascular disease in persons who are susceptible. Untreated LOLY  can contribute to heart disease, stroke and diabetes.   3. What are the treatment options? In most situations, sleep apnea is a lifelong disease that must be managed with daily therapy. Medications are not effective for sleep apnea and surgery is generally not considered until other therapies have been tried. Your treatment is your choice . Continuous Positive Airway (CPAP) works right away and is the therapy that is effective in nearly everyone. An oral device to hold your jaw forward is usually the next most reliable option. Other options include postioning devices (to keep you off your back), weight loss, and surgery including a tongue pacing device. There is more detail about some of these options below.  4. Are my sleep studies covered by insurance? " Although we will request verification of coverage, we advise you also check in advance of the study to ensure there is coverage.    Important tips for those choosing CPAP and similar devices  For new devices, sign up for device ELENA to monitor your device for your followup visits  We encourage you to utilize the [employer] elena or website (myAir web (resmed.com) ) to monitor your therapy progress and share the data with your healthcare team when you discuss your sleep apnea.                                                    Know your equipment:  CPAP is continuous positive airway pressure that prevents obstructive sleep apnea by keeping the throat from collapsing while you are sleeping. In most cases, the device is  smart  and can slowly self-adjusts if your throat collapses and keeps a record every day of how well you are treated-this information is available to you and your care team.  BPAP is bilevel positive airway pressure that keeps your throat open and also assists each breath with a pressure boost to maintain adequate breathing.  Special kinds of BPAP are used in patients who have inadequate breathing from lung or heart disease. In most cases, the device is  smart  and can slowly self-adjusts to assist breathing. Like CPAP, the device keeps a record of how well you are treated.  Your mask is your connection to the device. You get to choose what feels most comfortable and the staff will help to make sure if fits. Here: are some examples of the different masks that are available: Magnetic mask aids may assist with use but there are safety issues that should be addressed when considering with magnets* ( see end of discussion).       Key points to remember on your journey with sleep apnea:  Sleep study.  PAP devices often need to be adjusted during a sleep study to show that they are effective and adjusted right.  Good tips to remember: Try wearing just the mask during a quiet time during the day so your  body adapts to wearing it. A humidifier is recommended for comfort in most cases to prevent drying of your nose and throat. Allergy medication from your provider may help you if you are having nasal congestion.  Getting settled-in. It takes more than one night for most of us to get used to wearing a mask. Try wearing just the mask during a quiet time during the day so your body adapts to wearing it. A humidifier is recommended for comfort in most cases. Our team will work with you carefully on the first day and will be in contact within 4 days and again at 2 and 4 weeks for advice and remote device adjustments. Your therapy is evaluated by the device each day.   Use it every night. The more you are able to sleep naturally for 7-8 hours, the more likely you will have good sleep and to prevent health risks or symptoms from sleep apnea. Even if you use it 4 hours it helps. Occasionally all of us are unable to use a medical therapy, in sleep apnea, it is not dangerous to miss one night.   Communicate. Call our skilled team on the number provided on the first day if your visit for problems that make it difficult to wear the device. Over 2 out of 3 patients can learn to wear the device long-term with help from our team. Remember to call our team or your sleep providers if you are unable to wear the device as we may have other solutions for those who cannot adapt to mask CPAP therapy. It is recommended that you sleep your sleep provider within the first 3 months and yearly after that if you are not having problems.   Use it for your health. We encourage use of CPAP masks during daytime quiet periods to allow your face and brain to adapt to the sensation of CPAP so that it will be a more natural sensation to awaken to at night or during naps. This can be very useful during the first few weeks or months of adapting to CPAP though it does not help medically to wear CPAP during wakefulness and  should not be used as a strategy  just to meet guidelines.  Take care of your equipment. Make sure you clean your mask and tubing using directions every day and that your filter and mask are replaced as recommended or if they are not working.     *Masks with magnets:  Updated Contraindications  Masks with magnetic components are contraindicated for use by patients where they, or anyone in close physical contact while using the mask, have the following:   Active medical implants that interact with magnets (i.e., pacemakers, implantable cardioverter defibrillators (ICD), neurostimulators, cerebrospinal fluid (CSF) shunts, insulin/infusion pumps)   Metallic implants/objects containing ferromagnetic material (i.e., aneurysm clips/flow disruption devices, embolic coils, stents, valves, electrodes, implants to restore hearing or balance with implanted magnets, ocular implants, metallic splinters in the eye)  Updated Warning  Keep the mask magnets at a safe distance of at least 6 inches (150 mm) away from implants or medical devices that may be adversely affected by magnetic interference. This warning applies to you or anyone in close physical contact with your mask. The magnets are in the frame and lower headgear clips, with a magnetic field strength of up to 400mT. When worn, they connect to secure the mask but may inadvertently detach while asleep.  Implants/medical devices, including those listed within contraindications, may be adversely affected if they change function under external magnetic fields or contain ferromagnetic materials that attract/repel to magnetic fields (some metallic implants, e.g., contact lenses with metal, dental implants, metallic cranial plates, screws, hesham hole covers, and bone substitute devices). Consult your physician and  of your implant / other medical device for information on the potential adverse effects of magnetic fields.    BESIDES CPAP, WHAT OTHER THERAPIES ARE THERE?    Positioning  Device  Positioning devices are generally used when sleep apnea is mild and only occurs on your back.This example shows a pillow that straps around the waist. It may be appropriate for those whose sleep study shows milder sleep apnea that occurs primarily when lying flat on one's back. Preliminary studies have shown benefit but effectiveness at home may need to be verified by a home sleep test. These devices are generally not covered by medical insurance.  Examples of devices that maintain sleeping on the back to prevent snoring and mild sleep apnea.    Belt type body positioner  http://LINAGORA.Cleanify/    Electronic reminder  http://nightshifttherapy.com/            Oral Appliance  What is oral appliance therapy?  An oral appliance device fits on your teeth at night like a retainer used after having braces. The device is made by a specialized dentist and requires several visits over 1-2 months before a manufactured device is made to fit your teeth and is adjusted to prevent your sleep apnea. Once an oral device is working properly, snoring should be improved. A home sleep test may be recommended at that time if to determine whether the sleep apnea is adequately treated.       Some things to remember:  -Oral devices are often, but not always, covered by your medical insurance. Be sure to check with your insurance provider.   -If you are referred for oral therapy, you will be given a list of specialized dentists to consider or you may choose to visit the Web site of the American Academy of Dental Sleep Medicine  -Oral devices are less likely to work if you have severe sleep apnea or are extremely overweight.     More detailed information  An oral appliance is a small acrylic device that fits over the upper and lower teeth  (similar to a retainer or a mouth guard). This device slightly moves jaw forward, which moves the base of the tongue forward, opens the airway, improves breathing for effective treat snoring and  obstructive sleep apnea in perhaps 7 out of 10 people .  The best working devices are custom-made by a dental device  after a mold is made of the teeth 1, 2, 3.  When is an oral appliance indicated?  Oral appliance therapy is recommended as a first-line treatment for patients with primary snoring, mild sleep apnea, and for patients with moderate sleep apnea who prefer appliance therapy to use of CPAP4, 5. Severity of sleep apnea is determined by sleep testing and is based on the number of respiratory events per hour of sleep.   How successful is oral appliance therapy?  The success rate of oral appliance therapy in patients with mild sleep apnea is 75-80% while in patients with moderate sleep apnea it is 50-70%. The chance of success in patients with severe sleep apnea is 40-50%. The research also shows that oral appliances have a beneficial effect on the cardiovascular health of LOLY patients at the same magnitude as CPAP therapy7.  Oral appliances should be a second-line treatment in cases of severe sleep apnea, but if not completely successful then a combination therapy utilizing CPAP plus oral appliance therapy may be effective. Oral appliances tend to be effective in a broad range of patients although studies show that the patients who have the highest success are females, younger patients, those with milder disease, and less severe obesity. 3, 6.   Finding a dentist that practices dental sleep medicine  Specific training is available through the American Academy of Dental Sleep Medicine for dentists interested in working in the field of sleep. To find a dentist who is educated in the field of sleep and the use of oral appliances, near you, visit the Web site of the American Academy of Dental Sleep Medicine.    References  1. Salma et al. Objectively measured vs self-reported compliance during oral appliance therapy for sleep-disordered breathing. Chest 2013; 144(5): 0234-3626.  2. Debora  et al. Objective measurement of compliance during oral appliance therapy for sleep-disordered breathing. Thorax 2013; 68(1): 91-96.  3. Marcos et al. Mandibular advancement devices in 620 men and women with LOLY and snoring: tolerability and predictors of treatment success. Chest 2004; 125: 5587-6846.  4. Thomas et al. Oral appliances for snoring and LOLY: a review. Sleep 2006; 29: 244-262.  5. Ken et al. Oral appliance treatment for LOLY: an update. J Clin Sleep Med 2014; 10(2): 215-227.  6. Vic et al. Predictors of OSAH treatment outcome. J Dent Res 2007; 86: 6669-6621.      Weight Loss:   Your Body mass index is 31.26 kg/m .    Being overweight does not necessarily mean you will have health consequences.  Those who have BMI over 35 or over 27 with existing medical conditions carries greater risk.   Weight loss decreases severity of sleep apnea in most people with obesity. For those with mild obesity who have developed snoring with weight gain, even 15-30 pound weight loss can improve and occasionally milder eliminate sleep apnea.  Structured and life-long dietary and health habits are necessary to lose weight and keep healthier weight levels.     The Comprehensive Weight loss program offers all aspects of weight loss strategies including two Non-Surgical Weight Loss Programs: Medical Weight Management and our 24 Week Healthy Lifestyle Program:    Medical Weight Management: You will meet with a Medical Weight Management Provider, as well as a Registered Dietician. The program may include medication therapy, dietary education, recommended exercise and physical therapy programs, monthly support group meetings, and possible psychological counseling. Follow up visits with the provider or dietician are scheduled based on your progress and needs.    24 Week Healthy Lifestyle Program: This unique program is designed to give you the support of weekly appointments and activities thru a 24-week period. It  may include all of the components of the basic program (above), with the addition of 11 individual Health  Visits, 24-week access to the [employer] website for over 700 online classes, and monthly support group meetings. This program has an out-of-pocket expense of $499 to cover the items that can not be billed to insurance (health coaches and [employer] access), and is non-refundable/non-transferable (you may be able to use a Health Savings Account; ask your HSA provider). There may be an optional meal replacement plan prescribed as well.   Surgical management achieves meaningful long-term weight loss and improvement in health risks in most patients with more severe obesity.      Sleep Apnea Surgery:    Surgery for obstructive sleep apnea is considered generally only when other therapies fail to work. Surgery may be discussed with you if you are having a difficult time tolerating CPAP and or when there is an abnormal structure that requires surgical correction.  Nose and throat surgeries often enlarge the airway to prevent collapse.  Most of these surgeries create pain for 1-2 weeks and up to half of the most common surgeries are not effective throughout life.  You should carefully discuss the benefits and drawbacks to surgery with your sleep provider and surgeon to determine if it is the best solution for you.   More information  Surgery for LOLY is directed at areas that are responsible for narrowing or complete obstruction of the airway during sleep.  There are a wide range of procedures available to enlarge and/or stabilize the airway to prevent blockage of breathing in the three major areas where it can occur: the palate, tongue, and nasal regions.  Successful surgical treatment depends on the accurate identification of the factors responsible for obstructive sleep apnea in each person.  A personalized approach is required because there is no single treatment that works well for everyone.  Because of  anatomic variation, consultation with an examination by a sleep surgeon is a critical first step in determining what surgical options are best for each patient.  In some cases, examination during sedation may be recommended in order to guide the selection of procedures.  Patients will be counseled about risks and benefits as well as the typical recovery course after surgery. Surgery is typically not a cure for a person s LOLY.  However, surgery will often significantly improve one s LOLY severity (termed  success rate ).  Even in the absence of a cure, surgery will decrease the cardiovascular risk associated with OSA7; improve overall quality of life8 (sleepiness, functionality, sleep quality, etc).      Palate Procedures:  Patients with LOLY often have narrowing of their airway in the region of their tonsils and uvula.  The goals of palate procedures are to widen the airway in this region as well as to help the tissues resist collapse.  Modern palate procedure techniques focus on tissue conservation and soft tissue rearrangement, rather than tissue removal.  Often the uvula is preserved in this procedure. Residual sleep apnea is common in patient after pharyngoplasty with an average reduction in sleep apnea events of 33%2.      Tongue Procedures:  ExamWhile patients are awake, the muscles that surround the throat are active and keep this region open for breathing. These muscles relax during sleep, allowing the tongue and other structures to collapse and block breathing.  There are several different tongue procedures available.  Selection of a tongue base procedure depends on characteristics seen on physical exam.  Generally, procedures are aimed at removing bulky tissues in this area or preventing the back of the tongue from falling back during sleep.  Success rates for tongue surgery range from 50-62%3.    Hypoglossal Nerve Stimulation:  Hypoglossal nerve stimulation has recently received approval from the [place]  [place] Food and Drug Administration for the treatment of obstructive sleep apnea.  This is based on research showing that the system was safe and effective in treating sleep apnea6.  Results showed that the median AHI score decreased 68%, from 29.3 to 9.0. This therapy uses an implant system that senses breathing patterns and delivers mild stimulation to airway muscles, which keeps the airway open during sleep.  The system consists of three fully implanted components: a small generator (similar in size to a pacemaker), a breathing sensor, and a stimulation lead.  Using a small handheld remote, a patient turns the therapy on before bed and off upon awakening.    Candidates for this device must be greater than 18 years of age, have moderate to severe obstructive sleep apnea with less than 25% central events  (AHI between 15-65), BMI less than 35, have tried CPAP/oral appliance for at least 8 weeks without success, and have appropriate upper airway anatomy (determined by a sleep endoscopy performed by Dr. Ponce Lou or Dr. Amilcar Ty).    Nasal Procedures:  Nasal obstruction can interfere with nasal breathing during the day and night.  Studies have shown that relief of nasal obstruction can improve the ability of some patients to tolerate positive airway pressure therapy for obstructive sleep apnea1.  Treatment options include medications such as nasal saline, topical corticosteroid and antihistamine sprays, and oral medications such as antihistamines or decongestants. Non-surgical treatments can include external nasal dilators for selected patients. If these are not successful by themselves, surgery can improve the nasal airway either alone or in combination with these other options.        Combination Procedures:  Combination of surgical procedures and other treatments may be recommended, particularly if patients have more than one area of narrowing or persistent positional disease.  The success rate of  combination surgery ranges from 66-80%2,3.    References  Lu LOZADA. The Role of the Nose in Snoring and Obstructive Sleep Apnoea: An Update.  Eur Arch Otorhinolaryngol. 2011; 268: 1365-73.   Norma SM; Mary JA; Bernadette JR; Pallanch JF; Milagros MB; Elizabeth SG; Chance MONTAGUE. Surgical modifications of the upper airway for obstructive sleep apnea in adults: a systematic review and meta-analysis. SLEEP 2010;33(10):2876-2860. Viridiana PETTY. Hypopharyngeal surgery in obstructive sleep apnea: an evidence-based medicine review.  Arch Otolaryngol Head Neck Surg. 2006 Feb;132(2):206-13.  William YH1, Isis Y, Marcin TEOFILO. The efficacy of anatomically based multilevel surgery for obstructive sleep apnea. Otolaryngol Head Neck Surg. 2003 Oct;129(4):327-35.  Viridiana PETTY, Goldberg A. Hypopharyngeal Surgery in Obstructive Sleep Apnea: An Evidence-Based Medicine Review. Arch Otolaryngol Head Neck Surg. 2006 Feb;132(2):206-13.  Eva PJ et al. Upper-Airway Stimulation for Obstructive Sleep Apnea.  N Engl J Med. 2014 Jan 9;370(2):139-49.  Marty Y et al. Increased Incidence of Cardiovascular Disease in Middle-aged Men with Obstructive Sleep Apnea. Am J Respir Crit Care Med; 2002 166: 159-165  Key EM et al. Studying Life Effects and Effectiveness of Palatopharyngoplasty (SLEEP) study: Subjective Outcomes of Isolated Uvulopalatopharyngoplasty. Otolaryngol Head Neck Surg. 2011; 144: 623-631.        WHAT IF I ONLY HAVE SNORING?    Mandibular advancement devices, lateral sleep positioning, long-term weight loss and treatment of nasal allergies have been shown to improve snoring.  Exercising tongue muscles with a game (https://apps.[employer]/us/elena/Mirametrix-reduce-snoring/wx4155055002) or stimulating the tongue during the day with a device (https://doi.org/10.3390/zkk87796003) have improved snoring in some individuals.  https://www.MaxCDN/  https://www.sleepfoundation.org/best-anti-snoring-mouthpieces-and-mouthguards    Remember to Drive  Safe... Drive Alive     Sleep health profoundly affects your health, mood, and your safety.  Thirty three percent of the population (one in three of us) is not getting enough sleep and many have a sleep disorder. Not getting enough sleep or having an untreated / undertreated sleep condition may make us sleepy without even knowing it. In fact, our driving could be dramatically impaired due to our sleep health. As your provider, here are some things I would like you to know about driving:     Here are some warning signs for impairment and dangerous drowsy driving:              -Having been awake more than 16 hours               -Looking tired               -Eyelid drooping              -Head nodding (it could be too late at this point)              -Driving for more than 30 minutes     Some things you could do to make the driving safer if you are experiencing some drowsiness:              -Stop driving and rest              -Call for transportation              -Make sure your sleep disorder is adequately treated     Some things that have been shown NOT to work when experiencing drowsiness while driving:              -Turning on the radio              -Opening windows              -Eating any  distracting  /  entertaining  foods (e.g., sunflower seeds, candy, or any other)              -Talking on the phone      Your decision may not only impact your life, but also the life of others. Please, remember to drive safe for yourself and all of us.        Behavioral Sleep Medicine Program    The [facility] Behavioral Sleep Medicine Program,  provides non-drug treatment for sleep problems as part of our multi-discipline sleep medicine program. Services offered include:    Cognitive-behavioral Therapies for Insomnia (CBT-I)  Management of Shift-work and Jet Lag Sleep Disorders  Management of Delayed, Advanced and Irregular Circadian Rhythm Sleep Disorders  Imagery Rehearsal Therapy (IRT) for Nightmare  Disorder  Adaptation to PAP Therapy for Obstructive Sleep Apnea  Behavioral strategies to manage hypersomnia and fatigue    You have been referred for consultation with a sleep psychologist who specializes in behavioral sleep medicine and treatment of insomnia.  The [facility] Program offers individualized telehealth services through our [facility] and online CBT-I.    Preparing for your Consultation    We recommmend you keep a Sleep Diary for at least a week prior to your visit. Complete the sleep diary each day first thing after you get up by answering a few key questions about your sleep using our convenient mobile khoa or paper sleep diary.  Your answers should be based on your recall of the past 24 hours.  Avoid watching the clock or recording data during the night.     Insomnia  Khoa    The Insomnia  mobile khoa  is a convenient way to keep track of your sleep prior to your sleep consultation.  Simply download the free khoa on your Apple or Android phone and record your information each morning.  The khoa includes training, self-assessment, and sleep schedule recommendations.  Prior to your consultation we recommend you use only the sleep diary function. You can e-mail yourself a copy of your sleep diary data by going to the Settings section and using the [employer] User Data function.  During your consultation your provider will review the data with you.          [facility] Sleep Diary    You can also track your sleep using the [facility] paper sleep diary.  You can upload your sleep diary and send it via a [employer] message, fax it to [phone], or have it with you at the time of your consultation.            CBT-I:  Frequently Asked Questions    What is CBT-I?    Cognitive Behavioral Therapy for Insomnia, also known as CBT-I, is a highly effective non-drug treatment for insomnia. The American College of Physicians recommends CBT-I as the  first treatment for chronic insomnia.  Research has shown CBT-I to be safer and more effective long term than sleeping pills.    What does CBT-I involve?     CBT-I targets behaviors that lead to chronic insomnia:  Habits that weaken the bed as a cue for sleep  Habits that weaken your body's sleep drive and sleep/wake clock   Unhelpful sleep thoughts that increase sleep-related worry and arousal.    The process involves 3-6 telehealth visits that guide you to implement proven strategies to get a better night's sleep.    People often see improvement in their sleep within a few weeks. Research shows if you keep practicing the skills you learn your sleep is likely to continue to improve 6-12 months after treatment.    Does this program prescribe or manage sleep medication?    No.  Your prescribing provider is responsible to assist you in managing your sleep medications.  Some people choose to stop using sleep medication prior to or during CBT-I.  Our program can work with your prescribing provider to help reduce or eliminate use of sleep medications.     Getting Started Today!    If you haven't already done so, we recommend you consider making the following changes to your sleep habits prior to your sleep consultation:     Reduce your consumption of caffeine and alcohol.  Both can disrupt sleep and make strengthening your sleep more difficult.  Specifically:    - Avoid caffeine within 6 hours of bedtime   - No more than 3 caffeinated beverages per day (e.g. 8 oz. cup coffee or 12 oz. cup soda)            - No alcohol within 3 hours of bedtime    Make sure your bedroom is quiet, comfortable and dark.  Noise, light and an uncomfortable sleep space can harm your sleep.      Keep the same sleep schedule 7 days a week.unless you do shift work.      Our Online CBT-I Program    If you want to get started today, research indicates that online CBT-I can be effective for some individuals. These programs requires comfort with  "elena-based or online learning.  However, digital CBT-I programs are not for everyone.  Contraindications include:    Seizure disorders,   Bipolar disorder,   Unstable medical or mental health conditions,   Frailty or risk of falling  Pregnancy    You should consult a sleep specialist before using these resources if you have:    Sleep Apnea  Restless Leg Syndrome  Sleep Walking  REM behavior disorder  Night Terrors  Excessive Daytime Sleepiness  Are engaged in shift work  Use prescription sleep medication    Click on the link below to get started today:                                  [email or web address]             Once you are registered you can share your sleep log data with [facility] where your health provider will be able to review your sleep log and progress.  Once you begin the program, go to the left side navigation bar and click on the  share sleep log  button:                                        This takes you to the next page where you enter the provider code [email or web address] and click Locate:                 This brings you to the verification page where you should see [facility] identified as your provider                                                                           By clicking \"Submit\" your sleep log data will be sent to our secure WebMD portal for review by you provider.     For Help Contact Customer Care At:    [email or web address]  If your sleep provider recommends online CBT-I for you , the cost for an entire 6-week program is $40.    To get started, copy and paste the link below which will take you to the landing page to register:                              Self-help Workbooks for Insomnia    If you have found self-help books useful in the past, you may want to consider reading one of the following books prior to your consultation:    Say Hawa to Insomnia: The Six-Week, Drug-Free Program Developed at [facility]" School.  Agustín Govea MD. Available in paperback, Bridgette, and audiobook.    Overcoming Insomnia: A Cognitive-Behavioral Therapy Approach, Workbook.  Sukhi Ocasio, PhD  and Kayy Hernandez, PhD.  Available in paperback and Bridgette.    Quiet Your Mind and Get to Sleep: Solutions to Insomnia for Those with Depression, Anxiety, or Chronic Pain.  Sheree Elizondo, PhD and Kayy Hernandez, PhD.  Available in paperback and Bridgette

## 2024-03-05 NOTE — TELEPHONE ENCOUNTER
Pre visit planning completed.      Procedure details:    Patient scheduled for Colonoscopy/Upper endoscopy (EGD) on 3/12/2024.     Arrival time: 1245. Procedure time 1415    Pre op exam needed? N/A    Facility location: [facility]; [address], [place] [address]    Sedation type: MAC    Indication for procedure: K21.9 (ICD-10-CM) - Gastroesophageal reflux disease without esophagitis, screening      Chart review:     Electronic implanted devices? No    Recent diagnosis of diverticulitis within the last 6 weeks? No    Diabetic? Yes. Not on diabetic medication    Diabetic medication HOLDING recommendations: (if applicable)  Oral diabetic medications: No  Diabetic injectables: No  Insulin: No      Medication review:    Anticoagulants? No    NSAIDS? No NSAID medications per patient's medication list.  RN will verify with pre-assessment call.    Other medication HOLDING recommendations:  N/A      Prep for procedure:     Bowel prep recommendation: Standard Golytely    Due to:  diabetes.     Prep instructions sent via [employer]       Sharonda Cheek RN  Endoscopy Procedure Pre Assessment RN  [phone] option 4

## 2024-03-06 ENCOUNTER — ANCILLARY PROCEDURE (OUTPATIENT)
Dept: CARDIOLOGY | Facility: CLINIC | Age: 74
End: 2024-03-06
Attending: INTERNAL MEDICINE
Payer: COMMERCIAL

## 2024-03-06 ENCOUNTER — OFFICE VISIT (OUTPATIENT)
Dept: CARDIOLOGY | Facility: CLINIC | Age: 74
End: 2024-03-06
Payer: COMMERCIAL

## 2024-03-06 VITALS
BODY MASS INDEX: 30.86 KG/M2 | OXYGEN SATURATION: 96 % | WEIGHT: 192 LBS | SYSTOLIC BLOOD PRESSURE: 137 MMHG | DIASTOLIC BLOOD PRESSURE: 82 MMHG | HEART RATE: 78 BPM | HEIGHT: 66 IN

## 2024-03-06 DIAGNOSIS — I42.9 CARDIOMYOPATHY, UNSPECIFIED TYPE (H): ICD-10-CM

## 2024-03-06 DIAGNOSIS — I51.81 STRESS-INDUCED CARDIOMYOPATHY: Primary | ICD-10-CM

## 2024-03-06 DIAGNOSIS — I49.3 FREQUENT PVCS: ICD-10-CM

## 2024-03-06 LAB — LVEF ECHO: NORMAL

## 2024-03-06 PROCEDURE — 99417 PROLNG OP E/M EACH 15 MIN: CPT | Performed by: INTERNAL MEDICINE

## 2024-03-06 PROCEDURE — 93308 TTE F-UP OR LMTD: CPT | Performed by: INTERNAL MEDICINE

## 2024-03-06 PROCEDURE — 99207 PR STATISTIC IV PUSH SINGLE INITIAL SUBSTANCE: CPT | Performed by: INTERNAL MEDICINE

## 2024-03-06 PROCEDURE — 93325 DOPPLER ECHO COLOR FLOW MAPG: CPT | Performed by: INTERNAL MEDICINE

## 2024-03-06 PROCEDURE — 99215 OFFICE O/P EST HI 40 MIN: CPT | Performed by: INTERNAL MEDICINE

## 2024-03-06 PROCEDURE — 93321 DOPPLER ECHO F-UP/LMTD STD: CPT | Performed by: INTERNAL MEDICINE

## 2024-03-06 RX ORDER — BISACODYL 5 MG/1
TABLET, DELAYED RELEASE ORAL
Qty: 4 TABLET | Refills: 0 | Status: ON HOLD | OUTPATIENT
Start: 2024-03-06 | End: 2024-03-15

## 2024-03-06 RX ADMIN — Medication 3 ML: at 12:45

## 2024-03-06 NOTE — PROGRESS NOTES
General Cardiology Clinic-[facility] [place]      Referring provider:Ashley Perry PA-C     HPI: Ms. Nadira Perez is a 73 year old  female with PMH significant for  -Hypertension  -Prediabetes  -Breast cancer survivor diagnosed in 2007 status post surgery, chemotherapy and radiation.  -Symptomatic frequent PVCs known since 2010    Patient was recently seen in cancer survivorship clinic on 11/2/2023 and reported dizziness and bradycardia.  Referred to cardiology.  Patient is a retired RN and she tells me that she has noticed her heart rate in 40s 50s.  She is concerned about her heart rate and she is wondering if she needs a pacemaker.  She reports feeling gas in your belly pushing up into her chest with discomfort and heartburn.  This is on and off.  Over the last couple of months.  It can last up to several hours.  Symptoms is worse when she is lying down.  Does not get worse with exertion.  Reports feeling dizzy which has been going on for years.  Feels PVCs.  No syncope.  She wore Zio patch for 2 weeks.  Result is not available at the time of this clinic visit (she noted the times that she felt dizzy while she was carrying the heart monitor).  She has known frequent PVCs since 2010.  At that time she was seen in cardiology clinic and underwent workup.  Coronary angiogram in 2009 showed no significant coronary artery disease.  Cardiac MRI showed low normal LV function.  There was a small area of wall thinning in the mid inferior wall.  She was on atenolol at that time.  Patient was recently seen in primary care clinic and she was recommended to increase spironolactone dose and stop metoprolol due to dizziness and high blood pressure.  Unfortunately she misunderstood and she increased the dose of spironolactone to 100 mg daily rather than 50 mg daily.  She is also telling me that since coming off of metoprolol she is feeling more PVCs.     Medications, personal, family, and social history reviewed with patient and  revised.    Interval history 3/6/2024:  Patient presented to [facility] on 2/12/2024 with chest pain after a fall.  CT of the chest showed acute mildly displaced fracture of the inferior sternal body.  Echocardiogram showed newly reduced LVEF at 35%.  Cardiology was consulted and she was recommended outpatient follow-up with cardiac MRI.  Patient is currently on amlodipine 5 mg, atorvastatin 80 mg, lisinopril 40 mg, metoprolol 50 mg, spironolactone 25 mg twice daily.    Patient is being seen today for cardiac clearance prior to colonoscopy/endoscopy scheduled for next week.  Patient continues to report midsternal chest pain related to sternal fracture which has been improving.  Denies PND, orthopnea.  She has noticed mild bilateral ankle edema over the last 2 days.  No weight gain.  She actually lost some weight.  Reports shortness of breath with exertion which is mild.  Does not feel palpitations.  No syncope.    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 270 capsule 3    CRANBERRY EXTRACT PO Take 2 tablets by mouth daily      diclofenac (VOLTAREN) 1 % topical gel Apply 2 g topically 4 times daily 100 g 6    diclofenac (VOLTAREN) 1 % topical gel Apply topically daily as needed for moderate pain Apply to wrist      gabapentin (NEURONTIN) 300 MG capsule Take 4 capsules (1,200 mg) by mouth 3 times daily 1080 capsule 3    HEMP OIL OR EXTRACT OR OTHER CBD CANNABINOID, NOT MEDICAL CANNABIS, THC      HYDROcodone-acetaminophen (NORCO) 5-325 MG tablet Take 1 tablet by mouth every 6 hours as needed for severe pain -- pcp notified and further refills would be addressed by pcp (Patient not taking: Reported on 3/5/2024) 30 tablet 0    levothyroxine (SYNTHROID/LEVOTHROID) 112 MCG tablet TAKE 1/2 TAB BY MOUTH EVERY DAY 45 tablet 3    lisinopril (ZESTRIL) 40 MG tablet Take 1 tablet (40 mg) by mouth daily 90 tablet 3    magnesium 250 MG tablet Take 1 tablet by mouth every evening      Melatonin 10 MG TABS tablet Take 10 mg by mouth at bedtime      metoprolol succinate ER (TOPROL XL) 50 MG 24 hr tablet Take 1 tablet (50 mg) by mouth daily 90 tablet 3    Multiple Vitamin (MULTI-VITAMIN) per tablet Take 1 tablet by mouth daily      naloxone (NARCAN) 4 MG/0.1ML nasal spray Spray 1 spray (4 mg) into one nostril alternating nostrils once as needed for opioid reversal every 2-3 minutes until assistance arrives 0.2 mL 0    ondansetron (ZOFRAN-ODT) 4 MG ODT tab Take 1 tablet (4 mg) by mouth every 12 hours as needed for nausea 180 tablet 3    spironolactone (ALDACTONE) 25 MG tablet Take 25 mg by mouth 2 times daily      tiZANidine (ZANAFLEX) 4 MG tablet Take 1 tablet (4 mg) by mouth at bedtime 60 tablet 1       PAST SURGICAL HISTORY:  Past Surgical History:   Procedure Laterality Date    ABDOMEN SURGERY      ovarian cyst, mesh    ARTHRODESIS WRIST Right      ARTHRODESIS WRIST  02/14/2013    Procedure: ARTHRODESIS WRIST;  left wrist scaphoid excision, four bone fusion, iliac crest bone graft  ( Mac with block);  Surgeon: Av Mendez MD;  [facility]    BIOPSY      skin, vaginal    BLEPHAROPLASTY BILATERAL Bilateral 05/06/2022    Procedure: UPPER BLEPHAROPLASTY, BILATERAL;  Surgeon: Jemal Sanchez MD;  [place] [facility] [place]    CATARACT IOL, RT/LT Right 03/13/2018    CATARACT IOL, RT/LT Left 02/20/2018    COLONOSCOPY  12/24/2013    Procedure: COMBINED COLONOSCOPY, SINGLE BIOPSY/POLYPECTOMY BY BIOPSY;  COLONOSCOPY;  Surgeon: Dom Alvarez MD;  Location:  GI    COSMETIC BLEPHAROPLASTY LOWER LIDS BILATERAL Bilateral 05/06/2022    Procedure: BLEPHAROPLASTY, LOWER EYELID, BILATERAL, COSMETIC;  Surgeon: Jemal Sanchez MD;  [place] [facility] [place]    COSMETIC SURGERY      ESOPHAGOSCOPY, GASTROSCOPY, DUODENOSCOPY (EGD), COMBINED N/A 11/23/2016    Procedure: COMBINED ESOPHAGOSCOPY, GASTROSCOPY, DUODENOSCOPY (EGD);  Surgeon: Quinten Feliciano MD;  Location:  GI    EXTERNAL EAR SURGERY      right    EYE SURGERY      radial keratomy    FUSION, SPINE, INTERBODY, OBLIQUE ANTERIOR AND LUMBAR, 2 LEVELS, POST APPROACH, USING OTS N/A 11/18/2021    Procedure: Part 1: Oblique Anterior Interbody Fusion at Lumbar 5 to sacral 1 with use of Bone Morphogenic Protein,;  Surgeon: Serge Ramirez MD;  Location:  OR    GI SURGERY      Umbilical hernia repair    GRAFT BONE FROM ILIAC CREST  02/14/2013    Procedure: GRAFT BONE FROM ILIAC CREST;  mac with block and local infilitration;  Surgeon: Av Mendez MD;  Location:  OR    HC BREATH HYDROGEN TEST N/A 10/14/2016    Procedure: HYDROGEN BREATH TEST;  Surgeon: Cheri Barron MD;  Location:  GI    HERNIA REPAIR      umbilical age 18 mos.    HYSTERECTOMY TOTAL ABDOMINAL  05/03/2000    INJECT EPIDURAL CAUDAL N/A 09/12/2023    Procedure: Caudal epidural steroid injection with fluoroscopy;   Surgeon: Natasha Umaña MD;  Location: [facility] OR    INJECT EPIDURAL CAUDAL N/A 1/2/2024    Procedure: INJECTION, EPIDURAL, CAUDAL;  Surgeon: Natasha Umaña MD;  Location: [facility] OR    INJECT EPIDURAL LUMBAR N/A 05/23/2023    Procedure: L3-4 interlaminar epidural steroid injection with fluoroscopy ( left> right);  Surgeon: Natasha Umaña MD;  Location: [facility] OR    INJECT JOINT SACROILIAC Left 04/27/2023    Procedure: Left sacroiliac joint steroid injection with fluoroscopy;  Surgeon: Natasha Umaña MD;  Location: [facility] OR    MASTECTOMY MODIFIED RADICAL Bilateral     bilateral; right breast prophylactic    OPTICAL TRACKING SYSTEM FUSION POSTERIOR SPINE LUMBAR N/A 11/18/2021    Procedure: Open Posterior Instrumented Spinal Fusion at Lumbar 5 to Sacral 1, with grimm aguayo osteotomy, use of O-Arm/Stealth;  Surgeon: Serge Ramirez MD;  Location: [facility]    PARATHYROIDECTOMY  09/23/2004    R SUPERIOR    PARATHYROIDECTOMY  09/23/2004    parathyroid resection, subtotal    RELEASE CARPAL TUNNEL Right 12/02/2021    Procedure: RIGHT CARPAL TUNNEL RELEASE, RIGHT WRIST HARDWARE REMOVAL, RIGHT CARPAL BOSS EXCISION;  Surgeon: Rolan Conley MD;  Location: [facility] OR    REMOVE HARDWARE HAND  09/24/2013    Procedure: REMOVE HARDWARE HAND;  Left Hand Screw Removal       RHINOPLASTY  1968    thyr proc skin closed cosmetic manner by subcuticular stitch  01/23/2009    THYROPLASTY  10/09/2009    TONSILLECTOMY  1977    VITRECTOMY PARSPLANA WITH 25 GAUGE SYSTEM Left 06/20/2022    Procedure: LEFT EYE VITRECTOMY, PARS PLANA APPROACH, USING 25-GAUGE INSTRUMENTS, laser;  Surgeon: Felix Carlos MD;  Location: [facility] OR    WRIST SURGERY      wrist arthrodesis       ALLERGIES:     Allergies   Allergen Reactions    Erythromycin Nausea    Penicillins Hives     Around age 6 - doesn't recall full reaction, mother told her it was hives.     Has tolerated cephalsporins.        FAMILY HISTORY:  Family History   Problem Relation Age of Onset     Neurologic Disorder Mother         Anuerysm of Cerebral Artery, Dementia    Diabetes Mother     Thyroid Disease Mother         Hyperthyroidism (goiter)    Cerebrovascular Disease Mother         Hemorrhagic    Dementia Mother     Osteoporosis Mother     Hyperlipidemia Mother     Heart Disease Father         AAA    Hypertension Father     Coronary Artery Disease Father     Hyperlipidemia Father     Mental Illness Father         Schizophrenia?    Dementia Brother         hydrocephalis    Circulatory Brother         Perihperal Neurophathy    Diabetes Maternal Grandmother         Presumed Type 2    Asthma Maternal Grandmother     Chronic Obstructive Pulmonary Disease Maternal Grandfather         father    Asthma Maternal Grandfather     Diabetes Maternal Aunt         x2    Melanoma Maternal Aunt     Glaucoma Maternal Aunt     Breast Cancer Cousin     Breast Cancer Cousin     Dementia Other     Cancer Other         malignant melanoma    Hypertension Other     Hypertension Other     Cerebrovascular Disease Other     Cerebrovascular Disease Other     Obesity Other     Respiratory Other     Cancer Other     Diabetes Other     Asthma Other     Other Cancer Other         Malignant melanoma    Obesity Other     Macular Degeneration No family hx of     Kidney Disease No family hx of     Thrombosis No family hx of     Arthritis No family hx of     Depression No family hx of     Substance Abuse No family hx of     Cystic Fibrosis No family hx of     Early Death No family hx of     Coronary Artery Disease Early Onset No family hx of     Heart Failure No family hx of     Bleeding Diathesis No family hx of     Ovarian Cancer No family hx of     Uterine Cancer No family hx of     Prostate Cancer No family hx of     Colorectal Cancer No family hx of     Pancreatic Cancer No family hx of     Lung Cancer No family hx of     Autoimmune Disease No family hx of     Unknown/Adopted No family hx of     Genetic Disorder No family hx of      "Bleeding Disorder No family hx of     Clotting Disorder No family hx of     Anesthesia Reaction No family hx of          SOCIAL HISTORY:  Social History     Tobacco Use    Smoking status: Never     Passive exposure: Never    Smokeless tobacco: Never   Vaping Use    Vaping Use: Never used   Substance Use Topics    Alcohol use: Not Currently     Alcohol/week: 7.0 standard drinks of alcohol     Types: 7 Glasses of wine per week     Comment: Rare if ever    Drug use: Yes     Types: Marijuana     Comment: Smoke through water filter for chronic back pain PRN.       ROS:   Constitutional: No fever, chills, or sweats. Weight stable.   Cardiovascular: As per HPI.       Exam:  /82   Pulse 78   Ht 1.676 m (5' 6\")   Wt 87.1 kg (192 lb)   LMP  (LMP Unknown)   SpO2 96%   BMI 30.99 kg/m    GENERAL APPEARANCE: alert and no distress  HEENT: no icterus, no central cyanosis  LYMPH/NECK: no adenopathy, no asymmetry, JVP not elevated  RESPIRATORY: lungs clear to auscultation - no rales, rhonchi or wheezes, no use of accessory muscles, no retractions, respirations are unlabored, normal respiratory rate  CARDIOVASCULAR: regular rhythm with PVCs, normal S1, S2, no S3 or S4 and no murmur, click or rub, precordium quiet with normal PMI.  GI: soft, non tender  EXTREMITIES: 1+ pretibial edema  NEURO: alert, normal speech,and affect  SKIN: no ecchymoses, no rashes     I have reviewed the labs and personally reviewed the imaging below and made my comment in the assessment and plan.    Labs:  CBC RESULTS:   Lab Results   Component Value Date    WBC 10.3 02/17/2024    WBC 7.9 05/07/2021    RBC 4.08 02/17/2024    RBC 4.04 05/07/2021    HGB 11.9 02/17/2024    HGB 11.5 (L) 05/07/2021    HCT 37.3 02/17/2024    HCT 36.8 05/07/2021    MCV 91 02/17/2024    MCV 91 05/07/2021    MCH 29.2 02/17/2024    MCH 28.5 05/07/2021    MCHC 31.9 02/17/2024    MCHC 31.3 (L) 05/07/2021    RDW 15.2 (H) 02/17/2024    RDW 14.5 05/07/2021     02/17/2024 "     05/07/2021       BMP RESULTS:  Lab Results   Component Value Date     02/17/2024     05/07/2021     05/07/2021    POTASSIUM 4.5 02/17/2024    POTASSIUM 4.3 07/16/2022    POTASSIUM 2.6 (LL) 11/18/2021    POTASSIUM 3.2 (L) 05/07/2021    POTASSIUM 3.3 (L) 05/07/2021    CHLORIDE 103 02/17/2024    CHLORIDE 108 01/12/2022    CHLORIDE 105 05/07/2021    CHLORIDE 107 05/07/2021    CO2 25 02/17/2024    CO2 27 01/12/2022    CO2 31 05/07/2021    CO2 30 05/07/2021    ANIONGAP 12 02/17/2024    ANIONGAP 9 01/12/2022    ANIONGAP 4 05/07/2021    ANIONGAP 5 05/07/2021     (H) 02/17/2024    GLC 96 01/12/2022    GLC 99 05/07/2021    GLC 98 05/07/2021    BUN 24.1 (H) 02/17/2024    BUN 19 01/12/2022    BUN 21 05/07/2021    BUN 22 05/07/2021    CR 0.78 02/17/2024    CR 0.73 05/07/2021    CR 0.74 05/07/2021    GFRESTIMATED 81 02/17/2024    GFRESTIMATED 53 (L) 02/16/2024    GFRESTIMATED 85 05/07/2021    GFRESTIMATED 82 05/07/2021    GFRESTBLACK >90 05/07/2021    GFRESTBLACK >90 05/07/2021    RAMBO 9.3 02/17/2024    RAMBO 9.6 05/07/2021    RAMBO 9.5 05/07/2021            Transthoracic echocardiogram 3/6/2024  Left ventricular function is decreased. The ejection fraction is 50-55%  (borderline).  The left ventricular function has improved.    Transthoracic echocardiogram 2/16/2024  The left ventricle is mildly dilated.  The visual ejection fraction is estimated at 35%.  The right ventricle is normal in structure, function and size.  Aortic root dilatation is present.  TDS- fractured sternum. Fat pad vs. effusion(small)  Clinical correlation required The study was technically difficult. Compared to  prior study, changes are noted.    Transthoracic echocardiogram 12/22/2023:  Global and regional left ventricular function is normal with an EF of 55-60%.  Global right ventricular function is normal. The right ventricle is normal  size.  No significant valvular abnormalities were noted.  No pericardial effusion is  present.     This study was compared with the study from 12/20/2013 . No significant  changes noted.    Zio patch 11/14/2023: 12% PVC, 2 episodes of nonsustained VT, 5 episodes of nonsustained SVT.    Echocardiogram 2019  Left ventricular function, chamber size, wall motion, and wall thickness are  normal.The EF is 60-65%.  Right ventricular function, chamber size, wall motion, and thickness are  normal.  No significant valvular abnormalities were noted.     Compared to the previous study from 6/29/2009, there has been no significant  change. PVCs observed during the study.    Prior cardiac workup    1. Coronary angiogram: 2/5/09: normal EF 66%-small inferior WMA, no signif CAD,  reduced diastolic LV compliance-may be adding to dyspnea  2. Holter showed frequent PVC's 1/23/09 which correlated with symptoms of dizziness/SOB  3. Cardiac MRI 1/22/09 showed low normal LV function. There was a small region of wall thinning and abnormal wall motion in mid-inferior wall.  There was a 3 cm RV aneurysm noted that had good mobility.      Assessment and Plan:   Patient is being seen today for cardiac clearance prior to colonoscopy next week.  She recently fell (mechanical fall) and fractured her sternum.  She was admitted to [facility] on 2/16.  During that admission she was found to have mildly elevated troponin and subsequently underwent transthoracic echocardiogram on 2/16/2024 which showed newly reduced LVEF at 35%.  Cardiology recommended outpatient cardiac MRI (which is not scheduled yet).  She actually had an echocardiogram not long ago in December 2023 which showed normal LVEF.  I have personally reviewed the echo pictures from 2/16/2024.  It appears she has mid ventricular variant stress cardiomyopathy (apex and base has preserved wall motion with akinesis of the mid ventricular segments).  Today we have scheduled a limited echocardiogram in [facility] with contrast.  I reviewed the echo images from  today.  It appears there is some improvement in LVEF particularly mid ventricular segments are not as akinetic as how it was.  EF has improved to 45%.  I think this pretty much confirms that she had stress cardiomyopathy.  I think mild troponin leak which peaked at 31 during last hospital admission on 2/16 is related to this as well.    Currently she is feeling well except mild shortness of breath on exertion, mild ankle edema and ongoing pain in the mid sternum from sternal fracture.  She tells me that sternal pain has improved significantly.    # Mechanical fall on 2/16/2024 with sternal fracture  # Stress cardiomyopathy with LVEF down to 35% on the day of mechanical fall 2/16/2024 now has improved to 50 to 55%   # Frequent PVCs (Zio patch 11/14/2023 showed 12% PVC burden)  # Preprocedure cardiovascular evaluation prior to endoscopy/colonoscopy  -For frequent PVCs she is scheduled to see EP on 3/13.  -The recent decline in LVEF is unlikely due to PVCs but rather due to stress cardiomyopathy which I am expecting full recovery.  Today's echocardiogram shows improvement in LVEF from 35 to 50 to 55% .  -Patient can proceed to planned endoscopy/colonoscopy procedure.  -Will recheck LVEF/wall motion in a month with echocardiogram.  -She has mild lower extremity edema which I think is unlikely cardiac likely dependent edema.  -Patient will monitor her legs and report to us if there is weight gain and/or worsening lower extremity edema.  -Continue current treatment    # Hypertension  -Well-controlled.    No medication changes today.  Return to clinic 1 year or earlier as needed.    Total time spent today for this visit is 69 minutes including precharting, face-to-face clinic visit, review of labs/imaging and medical documentation.    Radha YARBROUGH MD  [facility] Division of Cardiology  Pager [phone]

## 2024-03-06 NOTE — TELEPHONE ENCOUNTER
Pre assessment completed for upcoming procedure.   (Please see previous telephone encounter notes for complete details)    Patient  returned call.       Procedure details:    Arrival time and facility location reviewed.    Pre op exam needed? N/A    Designated  policy reviewed. Instructed to have someone stay 24  hours post procedure.       Medication review:    Medications reviewed. Please see supporting documentation below. Holding recommendations discussed (if applicable).       Prep for procedure:     Procedure prep instructions reviewed.        Any additional information needed:  N/A      Patient  verbalized understanding and had no questions or concerns at this time.      Sharonda Cheek RN  Endoscopy Procedure Pre Assessment RN  [phone] option 4

## 2024-03-06 NOTE — NURSING NOTE
"Chief Complaint   Patient presents with    Follow Up    Cardiac Clearance       Initial /82   Pulse 78   Ht 1.676 m (5' 6\")   Wt 87.1 kg (192 lb)   LMP  (LMP Unknown)   SpO2 96%   BMI 30.99 kg/m   Estimated body mass index is 30.99 kg/m  as calculated from the following:    Height as of this encounter: 1.676 m (5' 6\").    Weight as of this encounter: 87.1 kg (192 lb)..  BP completed using cuff size: gautam Sandoval CMA (AAMA)    "

## 2024-03-06 NOTE — PATIENT INSTRUCTIONS
Thank you for coming to the [facility] at [place]; please note the following instructions:    1. Echocardiogram today    2. Limited Echocardiogram in about 1 month. Follow up if needed after Echo.     3. Monitor weight and swelling closely for the next week. Please weigh yourself daily. Let us know how you are doing either by phone call or MessageBunkerhart.     4. Cardiology Providers: Dr. Garcia Can would like you to return for a cardiac follow up in 1 year (March 2025).  We will contact you regarding your appointment when the time draws closer or you may call [phone] option #1 to arrange an appointment.  Mean while, if you should have any questions or concerns regarding your heart health, please contact us.  Thank you for choosing [facility] for your care.    5. You are cleared for your GI procedures coming up.       If you have any questions regarding your visit, please contact your care team:     CARDIOLOGY  TELEPHONE NUMBER   Nisreen DAVIS., Registered Nurse  Sandy HANSON, Registered Nurse  Zuri WILBURN, Registered Nurse  Miesha DANIELSON, Registered Medical Assistant  Becca FLORES, Certified Medical Assistant  Renata FLORES, Clinic Assistant [phone] (select option 1)    *After hours: [phone]   For Scheduling Appts:     [phone] (select option 1)    *After hours: [phone]   For the Device Clinic (Pacemakers and ICD's)  Tika KAM, Registered Nurse   During business hours: [phone]    *After business hours:  [phone] (select option 4)      Normal test result notifications will be released via [facility] or mailed within 7 business days.  All other test results, will be communicated via telephone once reviewed by your cardiologist.    If you need a medication refill, please contact your pharmacy.  Please allow 3 business days for your refill to be completed.    As always, thank you for trusting us with your health care needs!

## 2024-03-06 NOTE — TELEPHONE ENCOUNTER
Pt was cleared by cardiac per OV on 3/6/2024. Writer is waiting to hear back from anesthesia. Sharonda Cheek RN

## 2024-03-06 NOTE — LETTER
3/6/2024      RE: Nadira Perez  [id number]  [facility] [id number]       Dear Colleague,    Thank you for the opportunity to participate in the care of your patient, Nadira Perez, at the [facility]. Please see a copy of my visit note below.           General Cardiology Clinic[facility]      Referring provider:Ashley Perry PA-C     HPI: Ms. Nadira Perez is a 73 year old  female with PMH significant for  -Hypertension  -Prediabetes  -Breast cancer survivor diagnosed in 2007 status post surgery, chemotherapy and radiation.  -Symptomatic frequent PVCs known since 2010    Patient was recently seen in cancer survivorship clinic on 11/2/2023 and reported dizziness and bradycardia.  Referred to cardiology.  Patient is a retired RN and she tells me that she has noticed her heart rate in 40s 50s.  She is concerned about her heart rate and she is wondering if she needs a pacemaker.  She reports feeling gas in your belly pushing up into her chest with discomfort and heartburn.  This is on and off.  Over the last couple of months.  It can last up to several hours.  Symptoms is worse when she is lying down.  Does not get worse with exertion.  Reports feeling dizzy which has been going on for years.  Feels PVCs.  No syncope.  She wore Zio patch for 2 weeks.  Result is not available at the time of this clinic visit (she noted the times that she felt dizzy while she was carrying the heart monitor).  She has known frequent PVCs since 2010.  At that time she was seen in cardiology clinic and underwent workup.  Coronary angiogram in 2009 showed no significant coronary artery disease.  Cardiac MRI showed low normal LV function.  There was a small area of wall thinning in the mid inferior wall.  She was on atenolol at that time.  Patient was recently seen in primary care clinic and she was recommended to increase spironolactone  dose and stop metoprolol due to dizziness and high blood pressure.  Unfortunately she misunderstood and she increased the dose of spironolactone to 100 mg daily rather than 50 mg daily.  She is also telling me that since coming off of metoprolol she is feeling more PVCs.     Medications, personal, family, and social history reviewed with patient and revised.    Interval history 3/6/2024:  Patient presented to [facility] on 2/12/2024 with chest pain after a fall.  CT of the chest showed acute mildly displaced fracture of the inferior sternal body.  Echocardiogram showed newly reduced LVEF at 35%.  Cardiology was consulted and she was recommended outpatient follow-up with cardiac MRI.  Patient is currently on amlodipine 5 mg, atorvastatin 80 mg, lisinopril 40 mg, metoprolol 50 mg, spironolactone 25 mg twice daily.    Patient is being seen today for cardiac clearance prior to colonoscopy/endoscopy scheduled for next week.  Patient continues to report midsternal chest pain related to sternal fracture which has been improving.  Denies PND, orthopnea.  She has noticed mild bilateral ankle edema over the last 2 days.  No weight gain.  She actually lost some weight.  Reports shortness of breath with exertion which is mild.  Does not feel palpitations.  No syncope.    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 270 capsule 3    CRANBERRY EXTRACT PO Take 2 tablets by mouth daily      diclofenac (VOLTAREN) 1 % topical gel Apply 2 g topically 4 times daily 100 g 6    diclofenac (VOLTAREN) 1 % topical gel Apply topically daily as needed for moderate pain Apply to wrist      gabapentin (NEURONTIN) 300 MG capsule Take 4 capsules (1,200 mg) by mouth 3 times daily 1080 capsule 3    HEMP OIL OR EXTRACT OR OTHER CBD CANNABINOID, NOT MEDICAL CANNABIS, THC      HYDROcodone-acetaminophen (NORCO) 5-325 MG tablet Take 1 tablet by mouth every 6 hours as needed for severe pain -- pcp notified and further refills would be addressed by pcp (Patient not taking: Reported on 3/5/2024) 30 tablet 0    levothyroxine (SYNTHROID/LEVOTHROID) 112 MCG tablet TAKE 1/2 TAB BY MOUTH EVERY DAY 45 tablet 3    lisinopril (ZESTRIL) 40 MG tablet Take 1 tablet (40 mg) by mouth daily 90 tablet 3    magnesium 250 MG tablet Take 1 tablet by mouth every evening      Melatonin 10 MG TABS tablet Take 10 mg by mouth at bedtime      metoprolol succinate ER (TOPROL XL) 50 MG 24 hr tablet Take 1 tablet (50 mg) by mouth daily 90 tablet 3    Multiple Vitamin (MULTI-VITAMIN) per tablet Take 1 tablet by mouth daily      naloxone (NARCAN) 4 MG/0.1ML nasal spray Spray 1 spray (4 mg) into one nostril alternating nostrils once as needed for opioid reversal every 2-3 minutes until assistance arrives 0.2 mL 0    ondansetron (ZOFRAN-ODT) 4 MG ODT tab Take 1 tablet (4 mg) by mouth every 12 hours as needed  for nausea 180 tablet 3    spironolactone (ALDACTONE) 25 MG tablet Take 25 mg by mouth 2 times daily      tiZANidine (ZANAFLEX) 4 MG tablet Take 1 tablet (4 mg) by mouth at bedtime 60 tablet 1       PAST SURGICAL HISTORY:  Past Surgical History:   Procedure Laterality Date    ABDOMEN SURGERY      ovarian cyst, mesh    ARTHRODESIS WRIST Right     ARTHRODESIS WRIST  02/14/2013    Procedure: ARTHRODESIS WRIST;  left wrist scaphoid excision, four bone fusion, iliac crest bone graft  ( Mac with block);  Surgeon: Av Mendez MD;  [facility]    BIOPSY      skin, vaginal    BLEPHAROPLASTY BILATERAL Bilateral 05/06/2022    Procedure: UPPER BLEPHAROPLASTY, BILATERAL;  Surgeon: Jemal Sanchez MD;  Location: [facility] OR    CATARACT IOL, RT/LT Right 03/13/2018    CATARACT IOL, RT/LT Left 02/20/2018    COLONOSCOPY  12/24/2013    Procedure: COMBINED COLONOSCOPY, SINGLE BIOPSY/POLYPECTOMY BY BIOPSY;  COLONOSCOPY;  Surgeon: Dom Alvarez MD;  Location:  GI    COSMETIC BLEPHAROPLASTY LOWER LIDS BILATERAL Bilateral 05/06/2022    Procedure: BLEPHAROPLASTY, LOWER EYELID, BILATERAL, COSMETIC;  Surgeon: Jemal Sanchez MD;  Location: [facility] OR    COSMETIC SURGERY      ESOPHAGOSCOPY, GASTROSCOPY, DUODENOSCOPY (EGD), COMBINED N/A 11/23/2016    Procedure: COMBINED ESOPHAGOSCOPY, GASTROSCOPY, DUODENOSCOPY (EGD);  Surgeon: Quinten Feliciano MD;  Location:  GI    EXTERNAL EAR SURGERY      right    EYE SURGERY      radial keratomy    FUSION, SPINE, INTERBODY, OBLIQUE ANTERIOR AND LUMBAR, 2 LEVELS, POST APPROACH, USING OTS N/A 11/18/2021    Procedure: Part 1: Oblique Anterior Interbody Fusion at Lumbar 5 to sacral 1 with use of Bone Morphogenic Protein,;  Surgeon: Serge Ramirez MD;  Location:  OR    GI SURGERY      Umbilical hernia repair    GRAFT BONE FROM ILIAC CREST  02/14/2013    Procedure: GRAFT BONE FROM ILIAC CREST;  mac with block and local infilitration;  Surgeon: Av Mendez MD;   [facility]    HC BREATH HYDROGEN TEST N/A 10/14/2016    Procedure: HYDROGEN BREATH TEST;  Surgeon: Cheri Barron MD;  Location: [facility] GI    HERNIA REPAIR      umbilical age 18 mos.    HYSTERECTOMY TOTAL ABDOMINAL  05/03/2000    INJECT EPIDURAL CAUDAL N/A 09/12/2023    Procedure: Caudal epidural steroid injection with fluoroscopy;  Surgeon: Natasha Umaña MD;  Location: [facility] OR    INJECT EPIDURAL CAUDAL N/A 1/2/2024    Procedure: INJECTION, EPIDURAL, CAUDAL;  Surgeon: Natasha Umaña MD;  Location: [facility] OR    INJECT EPIDURAL LUMBAR N/A 05/23/2023    Procedure: L3-4 interlaminar epidural steroid injection with fluoroscopy ( left> right);  Surgeon: Natasha Umaña MD;  Location: [facility] OR    INJECT JOINT SACROILIAC Left 04/27/2023    Procedure: Left sacroiliac joint steroid injection with fluoroscopy;  Surgeon: Natasha Umaña MD;  Location: [facility] OR    MASTECTOMY MODIFIED RADICAL Bilateral     bilateral; right breast prophylactic    OPTICAL TRACKING SYSTEM FUSION POSTERIOR SPINE LUMBAR N/A 11/18/2021    Procedure: Open Posterior Instrumented Spinal Fusion at Lumbar 5 to Sacral 1, with grimm aguayo osteotomy, use of O-Arm/Stealth;  Surgeon: Serge Ramirez MD;  [facility]    PARATHYROIDECTOMY  09/23/2004    R SUPERIOR    PARATHYROIDECTOMY  09/23/2004    parathyroid resection, subtotal    RELEASE CARPAL TUNNEL Right 12/02/2021    Procedure: RIGHT CARPAL TUNNEL RELEASE, RIGHT WRIST HARDWARE REMOVAL, RIGHT CARPAL BOSS EXCISION;  Surgeon: Rolan Conley MD;  Location: [facility] OR    REMOVE HARDWARE HAND  09/24/2013    Procedure: REMOVE HARDWARE HAND;  Left Hand Screw Removal       RHINOPLASTY  1968    thyr proc skin closed cosmetic manner by subcuticular stitch  01/23/2009    THYROPLASTY  10/09/2009    TONSILLECTOMY  1977    VITRECTOMY PARSPLANA WITH 25 GAUGE SYSTEM Left 06/20/2022    Procedure: LEFT EYE VITRECTOMY, PARS PLANA APPROACH, USING 25-GAUGE INSTRUMENTS, laser;  Surgeon: Domo Pizano,  MD Felix;  Location: [facility] OR    WRIST SURGERY      wrist arthrodesis       ALLERGIES:     Allergies   Allergen Reactions    Erythromycin Nausea    Penicillins Hives     Around age 6 - doesn't recall full reaction, mother told her it was hives.     Has tolerated cephalsporins.        FAMILY HISTORY:  Family History   Problem Relation Age of Onset    Neurologic Disorder Mother         Anuerysm of Cerebral Artery, Dementia    Diabetes Mother     Thyroid Disease Mother         Hyperthyroidism (goiter)    Cerebrovascular Disease Mother         Hemorrhagic    Dementia Mother     Osteoporosis Mother     Hyperlipidemia Mother     Heart Disease Father         AAA    Hypertension Father     Coronary Artery Disease Father     Hyperlipidemia Father     Mental Illness Father         Schizophrenia?    Dementia Brother         hydrocephalis    Circulatory Brother         Perihperal Neurophathy    Diabetes Maternal Grandmother         Presumed Type 2    Asthma Maternal Grandmother     Chronic Obstructive Pulmonary Disease Maternal Grandfather         father    Asthma Maternal Grandfather     Diabetes Maternal Aunt         x2    Melanoma Maternal Aunt     Glaucoma Maternal Aunt     Breast Cancer Cousin     Breast Cancer Cousin     Dementia Other     Cancer Other         malignant melanoma    Hypertension Other     Hypertension Other     Cerebrovascular Disease Other     Cerebrovascular Disease Other     Obesity Other     Respiratory Other     Cancer Other     Diabetes Other     Asthma Other     Other Cancer Other         Malignant melanoma    Obesity Other     Macular Degeneration No family hx of     Kidney Disease No family hx of     Thrombosis No family hx of     Arthritis No family hx of     Depression No family hx of     Substance Abuse No family hx of     Cystic Fibrosis No family hx of     Early Death No family hx of     Coronary Artery Disease Early Onset No family hx of     Heart Failure No family hx of     Bleeding  "Diathesis No family hx of     Ovarian Cancer No family hx of     Uterine Cancer No family hx of     Prostate Cancer No family hx of     Colorectal Cancer No family hx of     Pancreatic Cancer No family hx of     Lung Cancer No family hx of     Autoimmune Disease No family hx of     Unknown/Adopted No family hx of     Genetic Disorder No family hx of     Bleeding Disorder No family hx of     Clotting Disorder No family hx of     Anesthesia Reaction No family hx of          SOCIAL HISTORY:  Social History     Tobacco Use    Smoking status: Never     Passive exposure: Never    Smokeless tobacco: Never   Vaping Use    Vaping Use: Never used   Substance Use Topics    Alcohol use: Not Currently     Alcohol/week: 7.0 standard drinks of alcohol     Types: 7 Glasses of wine per week     Comment: Rare if ever    Drug use: Yes     Types: Marijuana     Comment: Smoke through water filter for chronic back pain PRN.       ROS:   Constitutional: No fever, chills, or sweats. Weight stable.   Cardiovascular: As per HPI.       Exam:  /82   Pulse 78   Ht 1.676 m (5' 6\")   Wt 87.1 kg (192 lb)   LMP  (LMP Unknown)   SpO2 96%   BMI 30.99 kg/m    GENERAL APPEARANCE: alert and no distress  HEENT: no icterus, no central cyanosis  LYMPH/NECK: no adenopathy, no asymmetry, JVP not elevated  RESPIRATORY: lungs clear to auscultation - no rales, rhonchi or wheezes, no use of accessory muscles, no retractions, respirations are unlabored, normal respiratory rate  CARDIOVASCULAR: regular rhythm with PVCs, normal S1, S2, no S3 or S4 and no murmur, click or rub, precordium quiet with normal PMI.  GI: soft, non tender  EXTREMITIES: 1+ pretibial edema  NEURO: alert, normal speech,and affect  SKIN: no ecchymoses, no rashes     I have reviewed the labs and personally reviewed the imaging below and made my comment in the assessment and plan.    Labs:  CBC RESULTS:   Lab Results   Component Value Date    WBC 10.3 02/17/2024    WBC 7.9 05/07/2021 "    RBC 4.08 02/17/2024    RBC 4.04 05/07/2021    HGB 11.9 02/17/2024    HGB 11.5 (L) 05/07/2021    HCT 37.3 02/17/2024    HCT 36.8 05/07/2021    MCV 91 02/17/2024    MCV 91 05/07/2021    MCH 29.2 02/17/2024    MCH 28.5 05/07/2021    MCHC 31.9 02/17/2024    MCHC 31.3 (L) 05/07/2021    RDW 15.2 (H) 02/17/2024    RDW 14.5 05/07/2021     02/17/2024     05/07/2021       BMP RESULTS:  Lab Results   Component Value Date     02/17/2024     05/07/2021     05/07/2021    POTASSIUM 4.5 02/17/2024    POTASSIUM 4.3 07/16/2022    POTASSIUM 2.6 (LL) 11/18/2021    POTASSIUM 3.2 (L) 05/07/2021    POTASSIUM 3.3 (L) 05/07/2021    CHLORIDE 103 02/17/2024    CHLORIDE 108 01/12/2022    CHLORIDE 105 05/07/2021    CHLORIDE 107 05/07/2021    CO2 25 02/17/2024    CO2 27 01/12/2022    CO2 31 05/07/2021    CO2 30 05/07/2021    ANIONGAP 12 02/17/2024    ANIONGAP 9 01/12/2022    ANIONGAP 4 05/07/2021    ANIONGAP 5 05/07/2021     (H) 02/17/2024    GLC 96 01/12/2022    GLC 99 05/07/2021    GLC 98 05/07/2021    BUN 24.1 (H) 02/17/2024    BUN 19 01/12/2022    BUN 21 05/07/2021    BUN 22 05/07/2021    CR 0.78 02/17/2024    CR 0.73 05/07/2021    CR 0.74 05/07/2021    GFRESTIMATED 81 02/17/2024    GFRESTIMATED 53 (L) 02/16/2024    GFRESTIMATED 85 05/07/2021    GFRESTIMATED 82 05/07/2021    GFRESTBLACK >90 05/07/2021    GFRESTBLACK >90 05/07/2021    RAMBO 9.3 02/17/2024    RAMBO 9.6 05/07/2021    RAMBO 9.5 05/07/2021            Transthoracic echocardiogram 2/16/2024  The left ventricle is mildly dilated.  The visual ejection fraction is estimated at 35%.  The right ventricle is normal in structure, function and size.  Aortic root dilatation is present.  TDS- fractured sternum. Fat pad vs. effusion(small)  Clinical correlation required The study was technically difficult. Compared to  prior study, changes are noted.    Transthoracic echocardiogram 12/22/2023:  Global and regional left ventricular function is normal with an  EF of 55-60%.  Global right ventricular function is normal. The right ventricle is normal  size.  No significant valvular abnormalities were noted.  No pericardial effusion is present.     This study was compared with the study from 12/20/2013 . No significant  changes noted.    Zio patch 11/14/2023: 12% PVC, 2 episodes of nonsustained VT, 5 episodes of nonsustained SVT.    Echocardiogram 2019  Left ventricular function, chamber size, wall motion, and wall thickness are  normal.The EF is 60-65%.  Right ventricular function, chamber size, wall motion, and thickness are  normal.  No significant valvular abnormalities were noted.     Compared to the previous study from 6/29/2009, there has been no significant  change. PVCs observed during the study.    Prior cardiac workup    1. Coronary angiogram: 2/5/09: normal EF 66%-small inferior WMA, no signif CAD,  reduced diastolic LV compliance-may be adding to dyspnea  2. Holter showed frequent PVC's 1/23/09 which correlated with symptoms of dizziness/SOB  3. Cardiac MRI 1/22/09 showed low normal LV function. There was a small region of wall thinning and abnormal wall motion in mid-inferior wall.  There was a 3 cm RV aneurysm noted that had good mobility.      Assessment and Plan:   Patient is being seen today for cardiac clearance prior to colonoscopy next week.  She recently fell (mechanical fall) and fractured her sternum.  She was admitted to [facility] on 2/16.  During that admission she was found to have mildly elevated troponin and subsequently underwent transthoracic echocardiogram on 2/16/2024 which showed newly reduced LVEF at 35%.  Cardiology recommended outpatient cardiac MRI (which is not scheduled yet).  She actually had an echocardiogram not long ago in December 2023 which showed normal LVEF.  I have personally reviewed the echo pictures from 2/16/2024.  It appears she has mid ventricular variant stress cardiomyopathy (apex and base has preserved  wall motion with akinesis of the mid ventricular segments).  Today we have scheduled a limited echocardiogram in [facility] with contrast.  I reviewed the echo images from today.  It appears there is some improvement in LVEF particularly mid ventricular segments are not as akinetic as how it was.  EF has improved to 45%.  I think this pretty much confirms that she had stress cardiomyopathy.  I think mild troponin leak which peaked at 31 during last hospital admission on 2/16 is related to this as well.    Currently she is feeling well except mild shortness of breath on exertion, mild ankle edema and ongoing pain in the mid sternum from sternal fracture.  She tells me that sternal pain has improved significantly.    # Mechanical fall on 2/16/2024 with sternal fracture  # Stress cardiomyopathy with LVEF down to 35% on the day of mechanical fall 2/16/2024 now has improved to 45%.  # Frequent PVCs (Zio patch 11/14/2023 showed 12% PVC burden)  # Preprocedure cardiovascular evaluation prior to endoscopy/colonoscopy  -For frequent PVCs she is scheduled to see EP on 3/13.  -The recent decline in LVEF is unlikely due to PVCs but rather due to stress cardiomyopathy which I am expecting full recovery.  Today's echocardiogram shows improvement in LVEF from 35 to 45%.  -Patient can proceed to planned endoscopy/colonoscopy procedure.  -Will recheck LVEF/wall motion in a month with echocardiogram.  -She has mild lower extremity edema which I think is unlikely cardiac likely dependent edema.  -Patient will monitor her legs and report to us if there is weight gain and/or worsening lower extremity edema.  -Continue current treatment    # Hypertension  -Well-controlled.    No medication changes today.  Return to clinic 1 year or earlier as needed.    Total time spent today for this visit is 69 minutes including precharting, face-to-face clinic visit, review of labs/imaging and medical documentation.          Please do not hesitate  to contact me if you have any questions/concerns.     Sincerely,     Radha Rolle MD

## 2024-03-07 ENCOUNTER — OFFICE VISIT (OUTPATIENT)
Dept: INTERNAL MEDICINE | Facility: CLINIC | Age: 74
End: 2024-03-07
Payer: COMMERCIAL

## 2024-03-07 ENCOUNTER — LAB (OUTPATIENT)
Dept: LAB | Facility: CLINIC | Age: 74
End: 2024-03-07
Payer: COMMERCIAL

## 2024-03-07 ENCOUNTER — TELEPHONE (OUTPATIENT)
Dept: AUDIOLOGY | Facility: CLINIC | Age: 74
End: 2024-03-07

## 2024-03-07 VITALS
BODY MASS INDEX: 30.63 KG/M2 | HEART RATE: 80 BPM | OXYGEN SATURATION: 98 % | WEIGHT: 190.6 LBS | SYSTOLIC BLOOD PRESSURE: 123 MMHG | HEIGHT: 66 IN | DIASTOLIC BLOOD PRESSURE: 82 MMHG

## 2024-03-07 DIAGNOSIS — E78.00 PURE HYPERCHOLESTEROLEMIA: ICD-10-CM

## 2024-03-07 DIAGNOSIS — E11.9 TYPE 2 DIABETES MELLITUS WITHOUT COMPLICATION, WITHOUT LONG-TERM CURRENT USE OF INSULIN (H): Primary | ICD-10-CM

## 2024-03-07 DIAGNOSIS — Z86.39 HISTORY OF VITAMIN D DEFICIENCY: ICD-10-CM

## 2024-03-07 DIAGNOSIS — E21.3 HYPERPARATHYROIDISM, UNSPECIFIED (H): ICD-10-CM

## 2024-03-07 DIAGNOSIS — E56.9 VITAMIN DEFICIENCY: ICD-10-CM

## 2024-03-07 DIAGNOSIS — R22.41 LOWER LEG MASS, RIGHT: ICD-10-CM

## 2024-03-07 DIAGNOSIS — E11.9 DIABETES MELLITUS, TYPE 2 (H): ICD-10-CM

## 2024-03-07 LAB
CHOLEST SERPL-MCNC: 214 MG/DL
FASTING STATUS PATIENT QL REPORTED: YES
FASTING STATUS PATIENT QL REPORTED: YES
GLUCOSE SERPL-MCNC: 98 MG/DL (ref 70–99)
HBA1C MFR BLD: 6.2 %
HDLC SERPL-MCNC: 56 MG/DL
LDLC SERPL CALC-MCNC: 111 MG/DL
NONHDLC SERPL-MCNC: 158 MG/DL
TRIGL SERPL-MCNC: 237 MG/DL

## 2024-03-07 PROCEDURE — 82570 ASSAY OF URINE CREATININE: CPT | Performed by: NURSE PRACTITIONER

## 2024-03-07 PROCEDURE — 99000 SPECIMEN HANDLING OFFICE-LAB: CPT | Performed by: PATHOLOGY

## 2024-03-07 PROCEDURE — 99417 PROLNG OP E/M EACH 15 MIN: CPT | Performed by: NURSE PRACTITIONER

## 2024-03-07 PROCEDURE — 99215 OFFICE O/P EST HI 40 MIN: CPT | Performed by: NURSE PRACTITIONER

## 2024-03-07 PROCEDURE — 83036 HEMOGLOBIN GLYCOSYLATED A1C: CPT | Performed by: NURSE PRACTITIONER

## 2024-03-07 PROCEDURE — 82947 ASSAY GLUCOSE BLOOD QUANT: CPT | Performed by: PATHOLOGY

## 2024-03-07 PROCEDURE — 36415 COLL VENOUS BLD VENIPUNCTURE: CPT | Performed by: PATHOLOGY

## 2024-03-07 PROCEDURE — 82306 VITAMIN D 25 HYDROXY: CPT | Performed by: NURSE PRACTITIONER

## 2024-03-07 PROCEDURE — 80061 LIPID PANEL: CPT | Performed by: PATHOLOGY

## 2024-03-07 NOTE — PROGRESS NOTES
Assessment & Plan     Diabetes mellitus, type 2 (H)  - Albumin Random Urine Quantitative with Creat Ratio; Future  - REVIEW OF HEALTH MAINTENANCE PROTOCOL ORDERS  - Hemoglobin A1c; Future  - Glucose; Future    Lower leg mass, right  Maybe more apparent with reduction of bilateral LE edema.  - Adult General Surg Referral; Future    Pure hypercholesterolemia  - Lipid panel reflex to direct LDL Non-fasting; Future    Vitamin deficiency  - Vitamin D Deficiency; Future    58 minutes spent by me on the date of the encounter doing chart review and review of test results, time with patient, documentation.     Matilde Olamide LONG, BLUE Santos   Ismael is a 73 year old, presenting for the following health issues:  Follow Up (Pt would like to discuss pain management after fall, right lower leg lesion, and physical management)      3/7/2024     1:25 PM   Additional Questions   Roomed by Tiara Conley   Accompanied by N/a     Nadira Perez is a 73 year old female with a history of hypertension, hyperlipidemia, and breast cancer who presents for follow-up for several issues.  She sustained a fall at home on 2/12/24( tripped due to left foot drop) when she struck her sternum on the corner of a 2 drawer file cabinet, sustaining a mildly displaced fracture of the inferior sternal body. She reports the pain has improved and is now at a 3/10 for the most part managed with ibuprofen, tylenol, and gabapentin.     Her heart failure which was diagnosed in the past is pretty much resolved according to recent echo. Her leg swelling has decreased from 3+ to 1-2+.     Her bilateral knee pain which she sustained during the fall has improved.     The anterior tibial soft tissue lesion on her right lower leg is more apparent since her swelling has decreased. She is requesting a referral to Surgery Clinic for evaluation.  US was negative.     She is concerned that the discomfort she experiences in her left lower abdomin is caused by  Amlodipine as she read is can affect the architecture of the bowel wall.     She has an upper and lower endoscopy scheduled to follow-up on her hiatal hernia and has a colonoscopy due.     She continues to experience L > R low back pain.     She has an umbilical hernia which she is observing for now.     She is experiencing peripheral neuropathy and wondering if there are any new treatments Gabapentine causes fatigue.     She is requesting fasting  A1C, a vitamin D level, and lipids, BMP, and CBC.    She has a sleep study scheduled 8/20/24.     History of Present Illness       Back Pain:  She presents for follow up of back pain. Patient's back pain is a recurring problem.  Location of back pain:  Right lower back, left lower back, right middle of back and left middle of back  Description of back pain: dull ache  Back pain spreads: nowhere    Since patient first noticed back pain, pain is: gradually improving  Does back pain interfere with her job:  Yes      Heart Failure:  She presents for follow up of heart failure. She is not experiencing shortness of breath at night, with rest or with activity She is experiencing lower extremity edema which is worse than usual. She has orthopenea and is not coughing at night. Patient is checking weight daily. She has recently had a weight increase. She has no side effects from medications.  She has has a medical visit for heart failure 1 time since the last visit.    Hyperlipidemia:  She presents for follow up of hyperlipidemia.  She is taking medication to lower cholesterol. She is not having myalgia or other side effects to statin medications.    Hypertension: She presents for follow up of hypertension.  She does check blood pressure  regularly outside of the clinic. Outside blood pressures have been over 140/90. She follows a low salt diet.     Hypothyroidism:     Since last visit, patient describes the following symptoms::  Dry skin, Fatigue and Weight gain    Weight gain::   5 lbs.    Vascular Disease:  She presents for follow up of vascular disease.  She never takes nitroglycerin. She is not taking daily aspirin.    She eats 2-3 servings of fruits and vegetables daily.She consumes 1 sweetened beverage(s) daily.She exercises with enough effort to increase her heart rate 9 or less minutes per day.  She exercises with enough effort to increase her heart rate 3 or less days per week.   She is taking medications regularly.     Last Echo: Echo result w/o MOPS: Interpretation Summary Left ventricular function is decreased. The ejection fraction is 50-55%(borderline).The left ventricular function has improved.    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     Corneal opacity     Hypothyroidism     Osteopenia, unspecified location     Aromatase inhibitor use     Chronic pain of right knee     DDD (degenerative disc disease), lumbar     Degenerative scoliosis in adult patient     Carpal tunnel syndrome of right wrist     Peripheral neuropathy     Spinal stenosis of lumbar region with neurogenic claudication     Spondylolisthesis of lumbar region     Brain lesion     Dermatochalasis of both upper eyelids     S/P spinal fusion     Chronic bilateral low back pain     PVD (posterior vitreous detachment), left eye     Vitreous opacities of left eye     Closed nondisplaced fracture of lateral malleolus of left fibula, initial encounter     Acute left ankle pain     Sacroiliitis (H24)     Lumbar  "radiculopathy     Lumbosacral radiculopathy     Diabetes mellitus, type 2 (H)     Abnormal electrocardiogram     Ejection fraction < 50%     Closed fracture of sternum, unspecified portion of sternum, initial encounter         Objective    /82 (BP Location: Right arm, Patient Position: Sitting, Cuff Size: Adult Regular)   Pulse 80   Ht 1.676 m (5' 5.98\")   Wt 86.5 kg (190 lb 9.6 oz)   LMP  (LMP Unknown)   SpO2 98%   BMI 30.78 kg/m    Body mass index is 30.78 kg/m .  Physical Exam   GENERAL: alert and no distress  RESP: lungs clear to auscultation - no rales, rhonchi or wheezes  CV: regular rate and rhythm, normal S1 S2, no S3 or S4, no murmur, click or rub, no peripheral edema  MS: no gross musculoskeletal defects noted, no edema  SKIN: no suspicious lesions or rashes. She has a soft compressible mass over the proximal anterior lower leg.  NEURO: grossly normal and mentation intact  PSYCH: mentation appears normal, affect normal/bright        Signed Electronically by: MERCEDES Kyle CNP    "

## 2024-03-07 NOTE — TELEPHONE ENCOUNTER
Walk-in hearing aid services on 3/7/24: The patient asked to have her hearing aids cleaned and stated the right one didn't seem to be working.  Examination found the right  filter was fully occluded.  Both hearing aids were cleaned and the  filters and domes were replaced.  Afterwards the hearing aids were found to be working properly and were returned to the patient.  She was also given an [employer] application once again as well as the make, model, serial number and fit date of her hearing aids.

## 2024-03-08 ENCOUNTER — TELEPHONE (OUTPATIENT)
Dept: ORTHOPEDICS | Facility: CLINIC | Age: 74
End: 2024-03-08
Payer: COMMERCIAL

## 2024-03-08 DIAGNOSIS — D21.21 BENIGN NEOPLASM OF CONNECTIVE AND OTHER SOFT TISSUE OF RIGHT LOWER LIMB, INCLUDING HIP: Primary | ICD-10-CM

## 2024-03-08 LAB
CREAT UR-MCNC: 114 MG/DL
MICROALBUMIN UR-MCNC: 28.2 MG/L
MICROALBUMIN/CREAT UR: 24.74 MG/G CR (ref 0–25)
VIT D+METAB SERPL-MCNC: 53 NG/ML (ref 20–50)

## 2024-03-08 NOTE — TELEPHONE ENCOUNTER
Call placed to patient to inform her she is cleared by anesthesia to come in for her upcoming endoscopy as scheduled.    Patient verbalized understanding and in agreement with plan.    Letitia Chan RN  Endoscopy Procedure Pre Assessment RN  [phone] option 4

## 2024-03-08 NOTE — TELEPHONE ENCOUNTER
[facility]    Phone Message    May a detailed message be left on voicemail: yes     Reason for Call: Appointment Intake    Referring Provider Name: Matilde Quiñonez, MERCEDES CNP - ref to general surgery but they suggested ortho onc  Diagnosis and/or Symptoms: lower right leg lesion near bone    Action Taken: Message routed to:  [facility] ([facility] Ortho Onc    Travel Screening: Not Applicable

## 2024-03-08 NOTE — LETTER
3/13/2024         RE: Nadira Perez  [address]  [facility] [address]        Dear Ismael-    I am writing to you because I have not been able to reach you via my chart or telephone. Matilde Quiñonez referred you to see one of our providers for a lesion on your leg. This is an important appointment to keep. Please give me a call and I will happily help you to get this appointment scheduled. Call me as well if you are having any barriers preventing you to schedule this appointment such as insurance/financial or transportation related. My direct line is [phone].    I look forward to connecting with you soon!      Daja Shankar LPN  She/Her/ Hers  [facility]  [facility]  [address] [address] [phone]

## 2024-03-10 ASSESSMENT — PAIN SCALES - PAIN ENJOYMENT GENERAL ACTIVITY SCALE (PEG)
INTERFERED_ENJOYMENT_LIFE: 6
PEG_TOTALSCORE: 6
INTERFERED_GENERAL_ACTIVITY: 9
AVG_PAIN_PASTWEEK: 3

## 2024-03-11 ENCOUNTER — OFFICE VISIT (OUTPATIENT)
Dept: ANESTHESIOLOGY | Facility: CLINIC | Age: 74
End: 2024-03-11
Attending: ANESTHESIOLOGY
Payer: COMMERCIAL

## 2024-03-11 ENCOUNTER — ANESTHESIA EVENT (OUTPATIENT)
Dept: GASTROENTEROLOGY | Facility: CLINIC | Age: 74
End: 2024-03-11
Payer: COMMERCIAL

## 2024-03-11 VITALS
DIASTOLIC BLOOD PRESSURE: 81 MMHG | OXYGEN SATURATION: 97 % | WEIGHT: 190 LBS | HEIGHT: 66 IN | SYSTOLIC BLOOD PRESSURE: 130 MMHG | HEART RATE: 82 BPM | BODY MASS INDEX: 30.53 KG/M2

## 2024-03-11 DIAGNOSIS — M54.17 LUMBOSACRAL RADICULOPATHY: Primary | ICD-10-CM

## 2024-03-11 PROCEDURE — 99213 OFFICE O/P EST LOW 20 MIN: CPT | Performed by: ANESTHESIOLOGY

## 2024-03-11 ASSESSMENT — LIFESTYLE VARIABLES: TOBACCO_USE: 0

## 2024-03-11 ASSESSMENT — PAIN SCALES - GENERAL: PAINLEVEL: SEVERE PAIN (6)

## 2024-03-11 ASSESSMENT — ENCOUNTER SYMPTOMS
SEIZURES: 0
DYSRHYTHMIAS: 1

## 2024-03-11 NOTE — PROGRESS NOTES
Pain clinic follow up note    HPI:   Nadira Perez is a 73 year old year old female  who presents to the pain clinic with chronic low back pain, s/p recent hospitalization after a fall.     Patient Supplied Answers To the  Pain Questionnaire      3/11/2024    12:27 PM    Pain -  Patient Entered Questionnaire/Answers   What number best describes your pain right now:  0 = No pain  to  10 = Worst pain imaginable 5   How would you describe the pain throbbing   Which of the following worsen your pain lying down    standing    walking    exercise   Which of the following improve or reduce your pain sitting    medication    relaxation    thinking about something else   What number best describes your average pain for the past week:  0 = No pain  to  10 = Worst pain imaginable 4   What number best describes your LOWEST pain in past 24 hours:  0 = No pain  to  10 = Worst pain imaginable 2   What number best describes your WORST pain in past 24 hours:  0 = No pain  to  10 = Worst pain imaginable 7   What non-medicine treatments have you already had for your pain pain clinic    acupuncture    surgery    exercise             The patient reports that she had immediate benefit from the caudal GREGOR. Overall from the various injections she has found the caudal GREGOR the most beneficial. She unfortunately had a significant fall in February, after which she was hospitalized. She is still recovering from her hospitalization and slowly increasing her activity.   The patient received wrist steroid injection with Dr. Conley 2/2024. She is planning to hold off caudal GREGOR at this time and will call the clinic as needed.   She reports that back pain is only 2/10. She reports that her pain score is high due to chest pain from the injury.         Significant Medical History:   Past Medical History:   Diagnosis Date    Arthritis     Bone disease     Breast cancer (H)     Diabetes (H)     H/O kyphoplasty     Hearing problem     History of  blood transfusion     History of kidney stones     History of radiation therapy     Hyperlipemia     Hypertension     Hypopotassemia     Irregular heart beat     Kidney problem     Lymph edema     Medullary sponge kidney     Osteopenia     PONV (postoperative nausea and vomiting)     Reduced vision     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    Thyroid disease           Past Surgical History:  Past Surgical History:   Procedure Laterality Date    ABDOMEN SURGERY      ovarian cyst, mesh    ARTHRODESIS WRIST Right     ARTHRODESIS WRIST  02/14/2013    Procedure: ARTHRODESIS WRIST;  left wrist scaphoid excision, four bone fusion, iliac crest bone graft  ( Mac with block);  Surgeon: Av Mendez MD;  [facility]    BIOPSY      skin, vaginal    BLEPHAROPLASTY BILATERAL Bilateral 05/06/2022    Procedure: UPPER BLEPHAROPLASTY, BILATERAL;  Surgeon: Jemal Sanchez MD;  Location: [facility] [place]    CATARACT IOL, RT/LT Right 03/13/2018    CATARACT IOL, RT/LT Left 02/20/2018    COLONOSCOPY  12/24/2013    Procedure: COMBINED COLONOSCOPY, SINGLE BIOPSY/POLYPECTOMY BY BIOPSY;  COLONOSCOPY;  Surgeon: Dom Alvarez MD;  Location:  GI    COSMETIC BLEPHAROPLASTY LOWER LIDS BILATERAL Bilateral 05/06/2022    Procedure: BLEPHAROPLASTY, LOWER EYELID, BILATERAL, COSMETIC;  Surgeon: Jemal Sanchez MD;  Location: [facility] [place]    COSMETIC SURGERY      ESOPHAGOSCOPY, GASTROSCOPY, DUODENOSCOPY (EGD), COMBINED N/A 11/23/2016    Procedure: COMBINED ESOPHAGOSCOPY, GASTROSCOPY, DUODENOSCOPY (EGD);  Surgeon: Quinten Feliciano MD;  Location:  GI    EXTERNAL EAR SURGERY      right    EYE SURGERY      radial keratomy    FUSION, SPINE, INTERBODY, OBLIQUE ANTERIOR AND LUMBAR, 2 LEVELS, POST APPROACH, USING OTS N/A 11/18/2021    Procedure: Part 1: Oblique Anterior Interbody Fusion at Lumbar 5 to sacral 1 with use of Bone Morphogenic Protein,;  Surgeon: Serge Ramirez MD;  Location:  OR    GI SURGERY      Umbilical  hernia repair    GRAFT BONE FROM ILIAC CREST  02/14/2013    Procedure: GRAFT BONE FROM ILIAC CREST;  mac with block and local infilitration;  Surgeon: Av Mendez MD;  [facility]     BREATH HYDROGEN TEST N/A 10/14/2016    Procedure: HYDROGEN BREATH TEST;  Surgeon: Cheri Barron MD;  [facility]    HERNIA REPAIR      umbilical age 18 mos.    HYSTERECTOMY TOTAL ABDOMINAL  05/03/2000    INJECT EPIDURAL CAUDAL N/A 09/12/2023    Procedure: Caudal epidural steroid injection with fluoroscopy;  Surgeon: Natasha Umaña MD;  Location: [facility] OR    INJECT EPIDURAL CAUDAL N/A 1/2/2024    Procedure: INJECTION, EPIDURAL, CAUDAL;  Surgeon: Natasha Umaña MD;  Location: [facility] OR    INJECT EPIDURAL LUMBAR N/A 05/23/2023    Procedure: L3-4 interlaminar epidural steroid injection with fluoroscopy ( left> right);  Surgeon: Natasha Umaña MD;  Location: [facility] OR    INJECT JOINT SACROILIAC Left 04/27/2023    Procedure: Left sacroiliac joint steroid injection with fluoroscopy;  Surgeon: Natasha Umaña MD;  Location: [facility] OR    MASTECTOMY MODIFIED RADICAL Bilateral     bilateral; right breast prophylactic    OPTICAL TRACKING SYSTEM FUSION POSTERIOR SPINE LUMBAR N/A 11/18/2021    Procedure: Open Posterior Instrumented Spinal Fusion at Lumbar 5 to Sacral 1, with grimm agauyo osteotomy, use of O-Arm/Stealth;  Surgeon: Serge Ramirez MD;  Location: [facility]    PARATHYROIDECTOMY  09/23/2004    R SUPERIOR    PARATHYROIDECTOMY  09/23/2004    parathyroid resection, subtotal    RELEASE CARPAL TUNNEL Right 12/02/2021    Procedure: RIGHT CARPAL TUNNEL RELEASE, RIGHT WRIST HARDWARE REMOVAL, RIGHT CARPAL BOSS EXCISION;  Surgeon: Rolan Conley MD;  Location: [facility] OR    REMOVE HARDWARE HAND  09/24/2013    Procedure: REMOVE HARDWARE HAND;  Left Hand Screw Removal       RHINOPLASTY  1968    thyr proc skin closed cosmetic manner by subcuticular stitch  01/23/2009    THYROPLASTY  10/09/2009    TONSILLECTOMY  1977     VITRECTOMY PARSPLANA WITH 25 GAUGE SYSTEM Left 06/20/2022    Procedure: LEFT EYE VITRECTOMY, PARS PLANA APPROACH, USING 25-GAUGE INSTRUMENTS, laser;  Surgeon: Felix Carlos MD;  [place] [facility] [place]    WRIST SURGERY      wrist arthrodesis          Family History:  Family History   Problem Relation Age of Onset    Neurologic Disorder Mother         Anuerysm of Cerebral Artery, Dementia    Diabetes Mother     Thyroid Disease Mother         Hyperthyroidism (goiter)    Cerebrovascular Disease Mother         Hemorrhagic    Dementia Mother     Osteoporosis Mother     Hyperlipidemia Mother     Heart Disease Father         AAA    Hypertension Father     Coronary Artery Disease Father     Hyperlipidemia Father     Mental Illness Father         Schizophrenia?    Dementia Brother         hydrocephalis    Circulatory Brother         Perihperal Neurophathy    Diabetes Maternal Grandmother         Presumed Type 2    Asthma Maternal Grandmother     Chronic Obstructive Pulmonary Disease Maternal Grandfather         father    Asthma Maternal Grandfather     Diabetes Maternal Aunt         x2    Melanoma Maternal Aunt     Glaucoma Maternal Aunt     Breast Cancer Cousin     Breast Cancer Cousin     Dementia Other     Cancer Other         malignant melanoma    Hypertension Other     Hypertension Other     Cerebrovascular Disease Other     Cerebrovascular Disease Other     Obesity Other     Respiratory Other     Cancer Other     Diabetes Other     Asthma Other     Other Cancer Other         Malignant melanoma    Obesity Other     Macular Degeneration No family hx of     Kidney Disease No family hx of     Thrombosis No family hx of     Arthritis No family hx of     Depression No family hx of     Substance Abuse No family hx of     Cystic Fibrosis No family hx of     Early Death No family hx of     Coronary Artery Disease Early Onset No family hx of     Heart Failure No family hx of     Bleeding Diathesis No family hx of      Ovarian Cancer No family hx of     Uterine Cancer No family hx of     Prostate Cancer No family hx of     Colorectal Cancer No family hx of     Pancreatic Cancer No family hx of     Lung Cancer No family hx of     Autoimmune Disease No family hx of     Unknown/Adopted No family hx of     Genetic Disorder No family hx of     Bleeding Disorder No family hx of     Clotting Disorder No family hx of     Anesthesia Reaction No family hx of           Social History:  Social History     Socioeconomic History    Marital status:      Spouse name: Not on file    Number of children: Not on file    Years of education: Not on file    Highest education level: Not on file   Occupational History    Not on file   Tobacco Use    Smoking status: Never     Passive exposure: Never    Smokeless tobacco: Never   Vaping Use    Vaping Use: Never used   Substance and Sexual Activity    Alcohol use: Yes     Alcohol/week: 7.0 standard drinks of alcohol     Types: 7 Glasses of wine per week     Comment: Rare if ever    Drug use: Yes     Types: Marijuana     Comment: Smoke through water filter for chronic back pain PRN.    Sexual activity: Not Currently     Partners: Male     Birth control/protection: Post-menopausal, Female Surgical, None   Other Topics Concern     Service No    Blood Transfusions Not Asked    Caffeine Concern No    Occupational Exposure No    Hobby Hazards No    Sleep Concern No    Stress Concern No    Weight Concern No    Special Diet No    Back Care No    Exercise Yes     Comment: walks 4-6x  week for 20-30 min. each    Bike Helmet Not Asked    Seat Belt Yes    Self-Exams Yes    Parent/sibling w/ CABG, MI or angioplasty before 65F 55M? Yes     Comment: Father   Social History Narrative    .  Recently retired. 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        She lives with a renter.         She exercises 50 minutes three times a  week.     Social Determinants of Health     Financial Resource Strain: Low Risk  (12/19/2023)    Financial Resource Strain     Within the past 12 months, have you or your family members you live with been unable to get utilities (heat, electricity) when it was really needed?: No   Food Insecurity: Low Risk  (12/19/2023)    Food Insecurity     Within the past 12 months, did you worry that your food would run out before you got money to buy more?: No     Within the past 12 months, did the food you bought just not last and you didn t have money to get more?: No   Transportation Needs: Low Risk  (12/19/2023)    Transportation Needs     Within the past 12 months, has lack of transportation kept you from medical appointments, getting your medicines, non-medical meetings or appointments, work, or from getting things that you need?: No   Physical Activity: Not on file   Stress: Not on file   Social Connections: Not on file   Interpersonal Safety: Low Risk  (11/30/2023)    Interpersonal Safety     Do you feel physically and emotionally safe where you currently live?: Yes     Within the past 12 months, have you been hit, slapped, kicked or otherwise physically hurt by someone?: No     Within the past 12 months, have you been humiliated or emotionally abused in other ways by your partner or ex-partner?: No   Housing Stability: Low Risk  (12/19/2023)    Housing Stability     Do you have housing? : Yes     Are you worried about losing your housing?: No     Social History     Social History Narrative    .  Recently retired. She has retired from [employer] and hopes to focus on a home care program, [employer],  for the elderly in the future.        She lives with a renter.         She exercises 50 minutes three times a week.          Allergies:  Allergies   Allergen Reactions    Erythromycin Nausea    Penicillins Hives     Around age 6 - doesn't recall full reaction, mother told her it was hives.     Has tolerated  cephalsporins.        Current Medications:   Current Outpatient Medications   Medication Sig Dispense Refill    acetaminophen (TYLENOL) 500 MG tablet Take 1,000 mg by mouth 3 times daily      amLODIPine (NORVASC) 5 MG tablet Take 1 tablet (5 mg) by mouth daily 90 tablet 3    Ascorbic Acid (VITAMIN C) 500 MG CHEW Take 1 tablet by mouth daily      atorvastatin (LIPITOR) 80 MG tablet Take 1 tablet (80 mg) by mouth daily 90 tablet 3    bisacodyl (DULCOLAX) 5 MG EC tablet Take 2 tablets at 3 pm the day before your procedure. If your procedure is before 11 am, take 2 additional tablets at 11 pm. If your procedure is after 11 am, take 2 additional tablets at 6 am. For additional instructions refer to your colonoscopy prep instructions. 4 tablet 0    Cholecalciferol (VITAMIN D3) 50 MCG (2000 UT) CAPS TAKE 3 CAPSULES (6,000 UNITS) BY MOUTH DAILY. 270 capsule 3    CRANBERRY EXTRACT PO Take 2 tablets by mouth daily      diclofenac (VOLTAREN) 1 % topical gel Apply 2 g topically 4 times daily 100 g 6    gabapentin (NEURONTIN) 300 MG capsule Take 4 capsules (1,200 mg) by mouth 3 times daily 1080 capsule 3    HEMP OIL OR EXTRACT OR OTHER CBD CANNABINOID, NOT MEDICAL CANNABIS, THC      levothyroxine (SYNTHROID/LEVOTHROID) 112 MCG tablet TAKE 1/2 TAB BY MOUTH EVERY DAY 45 tablet 3    lisinopril (ZESTRIL) 40 MG tablet Take 1 tablet (40 mg) by mouth daily 90 tablet 3    magnesium 250 MG tablet Take 1 tablet by mouth every evening      Melatonin 10 MG TABS tablet Take 10 mg by mouth at bedtime      metoprolol succinate ER (TOPROL XL) 50 MG 24 hr tablet Take 1 tablet (50 mg) by mouth daily 90 tablet 3    Multiple Vitamin (MULTI-VITAMIN) per tablet Take 1 tablet by mouth daily      ondansetron (ZOFRAN-ODT) 4 MG ODT tab Take 1 tablet (4 mg) by mouth every 12 hours as needed for nausea 180 tablet 3    polyethylene glycol (GOLYTELY) 236 g suspension The night before the exam at 6 pm drink an 8-ounce glass every 15 minutes until the jug is  "half empty. If you arrive before 11 AM: Drink the other half of the Golytely jug at 11 PM night before procedure. If you arrive after 11 AM: Drink the other half of the Golytely jug at 6 AM day of procedure. For additional instructions refer to your colonoscopy prep instructions. 4000 mL 0    spironolactone (ALDACTONE) 25 MG tablet Take 25 mg by mouth 2 times daily      tiZANidine (ZANAFLEX) 4 MG tablet Take 1 tablet (4 mg) by mouth at bedtime 60 tablet 1       Physical Exam:     /81 (BP Location: Right arm, Patient Position: Chair, Cuff Size: Adult Large)   Pulse 82   Ht 1.676 m (5' 6\")   Wt 86.2 kg (190 lb)   LMP  (LMP Unknown)   SpO2 97%   BMI 30.67 kg/m      General Appearance: No distress, seated comfortably  Mood: Euthymic  HE ENT: Non constricted pupils  Respiratory: Non labored breathing  Skin: No rashes over exposed skin  MS: Normal muscle bulk  Neuro: Cranial nerves 2-12 intact  Gait: non antalgic, ambulates with out assistance      ASSESSMENT AND PLAN:     Encounter Diagnosis:  Lumbar stenosis and neurogenic claudication  Lumbosacral spondylosis  L5/S1 fusion  Spinal stenosis  Peripheral neuropathy       Nadira Perez is a 73 year old year old female  who presents to the pain clinic with low back pain, s/p recent hospitalization. It has been noted that the caudal GREGOR covers her chronic back pain the best thus far. Giong forward we will try to refrain from repeating SI joint and L3-4 GREGOR injection to avoid the side effects of cumulative steroids. D.w patient as she is receiving steroid injections in the wrist. D/w patient to keep a count of annual steroid injections received.     RECOMMENDATIONS:     1. Medications: no changes    2. Procedure: The patient will call the clinic for caudal GREGOR when she needs an interventions.     3. Education: return to PT appointments.     4. Referral to acupuncture placed again per the patient request.     Follow up: Renée Harris DNP. I d.w patient that my " last day is May 28th and my patients will be transitioned to the providers at the clinic.

## 2024-03-11 NOTE — TELEPHONE ENCOUNTER
VM left requesting a return call to get patient scheduled with an orthopedic oncologist.    Daja Shankar LPN

## 2024-03-11 NOTE — PATIENT INSTRUCTIONS
Referrals:     Acupuncture Referral.  -Please call your insurance provider to find out about acupuncture coverage, being that not all policies cover acupuncture services.       Recommended Follow up:      Establish care with MERCEDES Pace CNP.       Please call [phone] to schedule your clinic appointment if you don't already have an appointment scheduled.        To speak with a nurse, schedule/reschedule/cancel a clinic appointment, or request a medication refill call: [phone]    You can also reach us by [employer]: [email or web address]

## 2024-03-11 NOTE — ANESTHESIA PREPROCEDURE EVALUATION
Anesthesia Pre-Procedure Evaluation    Patient: Nadira Perez   MRN: [id number] : 1950        Procedure : Procedure(s):  Colonoscopy  Esophagoscopy, gastroscopy, duodenoscopy (EGD), combined          Diabetes mellitus, type 2 (H)  - Albumin Random Urine Quantitative with Creat Ratio; Future  - REVIEW OF HEALTH MAINTENANCE PROTOCOL ORDERS  - Hemoglobin A1c; Future  - Glucose; Future     Lower leg mass, right  Maybe more apparent with reduction of bilateral LE edema.  - Adult General Surg Referral; Future     Pure hypercholesterolemia  - Lipid panel reflex to direct LDL Non-fasting; Future     Vitamin deficiency  - Vitamin D Deficiency; Future    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    Thyroid disease       Past Surgical History:   Procedure Laterality Date    ABDOMEN SURGERY      ovarian cyst, mesh    ARTHRODESIS WRIST Right     ARTHRODESIS WRIST  2013    Procedure: ARTHRODESIS WRIST;  left wrist scaphoid excision, four bone fusion, iliac crest bone graft  ( Mac with block);  Surgeon: Av Mendez MD;  [facility]    BIOPSY      skin, vaginal    BLEPHAROPLASTY BILATERAL Bilateral 2022    Procedure: UPPER BLEPHAROPLASTY, BILATERAL;  Surgeon: Jemal Sanchez MD;  Location: [facility] OR    CATARACT IOL, RT/LT Right 2018    CATARACT IOL, RT/LT Left 2018    COLONOSCOPY  2013    Procedure: COMBINED COLONOSCOPY, SINGLE BIOPSY/POLYPECTOMY BY BIOPSY;  COLONOSCOPY;  Surgeon: Dom Alvarez MD;   Location:  GI    COSMETIC BLEPHAROPLASTY LOWER LIDS BILATERAL Bilateral 05/06/2022    Procedure: BLEPHAROPLASTY, LOWER EYELID, BILATERAL, COSMETIC;  Surgeon: Jemal Sanchez MD;  Location: [facility] [place] OR    COSMETIC SURGERY      ESOPHAGOSCOPY, GASTROSCOPY, DUODENOSCOPY (EGD), COMBINED N/A 11/23/2016    Procedure: COMBINED ESOPHAGOSCOPY, GASTROSCOPY, DUODENOSCOPY (EGD);  Surgeon: Quinten Feliciano MD;  Location:  GI    EXTERNAL EAR SURGERY      right    EYE SURGERY      radial keratomy    FUSION, SPINE, INTERBODY, OBLIQUE ANTERIOR AND LUMBAR, 2 LEVELS, POST APPROACH, USING OTS N/A 11/18/2021    Procedure: Part 1: Oblique Anterior Interbody Fusion at Lumbar 5 to sacral 1 with use of Bone Morphogenic Protein,;  Surgeon: Serge Ramirez MD;  Location:  OR    GI SURGERY      Umbilical hernia repair    GRAFT BONE FROM ILIAC CREST  02/14/2013    Procedure: GRAFT BONE FROM ILIAC CREST;  mac with block and local infilitration;  Surgeon: Av Mendez MD;  [facility]     BREATH HYDROGEN TEST N/A 10/14/2016    Procedure: HYDROGEN BREATH TEST;  Surgeon: Cheri Barron MD;  Location:  GI    HERNIA REPAIR      umbilical age 18 mos.    HYSTERECTOMY TOTAL ABDOMINAL  05/03/2000    INJECT EPIDURAL CAUDAL N/A 09/12/2023    Procedure: Caudal epidural steroid injection with fluoroscopy;  Surgeon: Natasha Umaña MD;  Location: [facility] OR    INJECT EPIDURAL CAUDAL N/A 1/2/2024    Procedure: INJECTION, EPIDURAL, CAUDAL;  Surgeon: Natasha Umaña MD;  Location: [facility] OR    INJECT EPIDURAL LUMBAR N/A 05/23/2023    Procedure: L3-4 interlaminar epidural steroid injection with fluoroscopy ( left> right);  Surgeon: Natasha Umaña MD;  Location: [facility] OR    INJECT JOINT SACROILIAC Left 04/27/2023    Procedure: Left sacroiliac joint steroid injection with fluoroscopy;  Surgeon: Natasha Umaña MD;  Location: [facility] OR    MASTECTOMY MODIFIED RADICAL Bilateral     bilateral; right breast prophylactic    OPTICAL  TRACKING SYSTEM FUSION POSTERIOR SPINE LUMBAR N/A 11/18/2021    Procedure: Open Posterior Instrumented Spinal Fusion at Lumbar 5 to Sacral 1, with grimm aguayo osteotomy, use of O-Arm/Stealth;  Surgeon: Serge Ramirez MD;  Location: [facility]    PARATHYROIDECTOMY  09/23/2004    R SUPERIOR    PARATHYROIDECTOMY  09/23/2004    parathyroid resection, subtotal    RELEASE CARPAL TUNNEL Right 12/02/2021    Procedure: RIGHT CARPAL TUNNEL RELEASE, RIGHT WRIST HARDWARE REMOVAL, RIGHT CARPAL BOSS EXCISION;  Surgeon: Rolan Conley MD;  Location: [facility] OR    REMOVE HARDWARE HAND  09/24/2013    Procedure: REMOVE HARDWARE HAND;  Left Hand Screw Removal       RHINOPLASTY  1968    thyr proc skin closed cosmetic manner by subcuticular stitch  01/23/2009    THYROPLASTY  10/09/2009    TONSILLECTOMY  1977    VITRECTOMY PARSPLANA WITH 25 GAUGE SYSTEM Left 06/20/2022    Procedure: LEFT EYE VITRECTOMY, PARS PLANA APPROACH, USING 25-GAUGE INSTRUMENTS, laser;  Surgeon: Felix Carlos MD;  Location: [facility] OR    WRIST SURGERY      wrist arthrodesis      Allergies   Allergen Reactions    Erythromycin Nausea    Penicillins Hives     Around age 6 - doesn't recall full reaction, mother told her it was hives.     Has tolerated cephalsporins.       Social History     Tobacco Use    Smoking status: Never     Passive exposure: Never    Smokeless tobacco: Never   Substance Use Topics    Alcohol use: Yes     Alcohol/week: 7.0 standard drinks of alcohol     Types: 7 Glasses of wine per week     Comment: Rare if ever      Wt Readings from Last 1 Encounters:   03/11/24 86.2 kg (190 lb)        Anesthesia Evaluation   Pt has had prior anesthetic. Type: General.    History of anesthetic complications  - PONV.      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 She has dysphagia from this as well.     No intubation note from  November surgery but no reported difficulties, patient reports she was told they used video laryngoscope    Hearing aids    (+)     LOLY risk factors, snores loudly,                               (-) tobacco use   Neurologic:     (+)    peripheral neuropathy, - feet, hands.                        (-) no seizures, no CVA and no TIA   Cardiovascular: Comment: Patient is s/p left axillary lymph node dissection and has lymphedema. She reports cannot have BP or IV on left arm.     (+) Dyslipidemia hypertension-range: >140/90 at home/ -   -  - -                        dysrhythmias, PVCs,        Previous cardiac testing   Echo: Date: 2019 Results:  Interpretation Summary  Left ventricular function, chamber size, wall motion, and wall thickness are  normal.The EF is 60-65%.  Right ventricular function, chamber size, wall motion, and thickness are  normal.  No significant valvular abnormalities were noted.     Compared to the previous study from 6/29/2009, there has been no significant  change. PVCs observed during the study.    Stress Test:  Date: Results:    ECG Reviewed:  Date: 11/4/21 Results:  Sinus rhythm, LVH  Cath:  Date: Results:      METS/Exercise Tolerance: 4 - Raking leaves, gardening    Hematologic:     (+)      anemia,          Musculoskeletal: Comment: DDD  scoliosis  CTS on left   Knee pain    Osteoporosis    Patient uses a walker for balance    sustained a fall at home on 2/12/24 (tripped due to left foot drop) when she struck her sternum on the corner of a 2 drawer file cabinet, sustaining a mildly displaced fracture of the inferior sternal body          GI/Hepatic:  - neg GI/hepatic ROS  (-) GERD   Renal/Genitourinary:       Endo: Comment: Impaired fasting glucose    (+)          thyroid problem, hypothyroidism,           Psychiatric/Substance Use:  - neg psychiatric ROS     Infectious Disease:  - neg infectious disease ROS     Malignancy:   (+) Malignancy, History of Breast and Skin.Breast CA Remission  "status post Surgery, Chemo and Radiation.  Skin CA Remission status post Surgery.      Other: Comment: dermatitis              OUTSIDE LABS:  CBC:   Lab Results   Component Value Date    WBC 10.3 02/17/2024    WBC 9.8 02/16/2024    HGB 11.9 02/17/2024    HGB 12.3 02/16/2024    HCT 37.3 02/17/2024    HCT 39.4 02/16/2024     02/17/2024     02/16/2024     BMP:   Lab Results   Component Value Date     02/17/2024     02/16/2024    POTASSIUM 4.5 02/17/2024    POTASSIUM 4.0 02/16/2024    CHLORIDE 103 02/17/2024    CHLORIDE 105 02/16/2024    CO2 25 02/17/2024    CO2 24 02/16/2024    BUN 24.1 (H) 02/17/2024    BUN 30.3 (H) 02/16/2024    CR 0.78 02/17/2024    CR 1.1 (H) 02/16/2024    GLC 98 03/07/2024     (H) 02/17/2024     COAGS:   Lab Results   Component Value Date    PTT 25 08/20/2010    INR 0.85 (L) 08/20/2010     POC:   Lab Results   Component Value Date    BGM 98 01/16/2008     HEPATIC:   Lab Results   Component Value Date    ALBUMIN 4.6 10/27/2022    PROTTOTAL 7.1 10/27/2022    ALT 21 11/30/2023    AST 25 11/30/2023    ALKPHOS 91 11/30/2023    BILITOTAL 0.2 10/27/2022     OTHER:   Lab Results   Component Value Date    PH 7.39 11/18/2021    A1C 6.2 (H) 03/07/2024    RAMBO 9.3 02/17/2024    PHOS 3.7 11/09/2023    MAG 2.3 11/09/2023    LIPASE 177 09/15/2016    TSH 2.03 12/04/2023    T4 1.05 11/21/2011    CRP <2.9 12/08/2021    SED 31 (H) 12/08/2021       Anesthesia Plan    ASA Status:  3                     Consents            Postoperative Care            Comments:               Maya Mccall MD    I have reviewed the pertinent notes and labs in the chart from the past 30 days and (re)examined the patient.  Any updates or changes from those notes are reflected in this note.              # Obesity: Estimated body mass index is 30.67 kg/m  as calculated from the following:    Height as of 3/11/24: 1.676 m (5' 6\").    Weight as of 3/11/24: 86.2 kg (190 lb).      "

## 2024-03-11 NOTE — LETTER
3/11/2024       RE: Nadira Perez  [id number]  [facility] [id number]       Dear Colleague,    Thank you for referring your patient, Nadira Perez, to the [facility] [place] at [facility]. Please see a copy of my visit note below.    Pain clinic follow up note    HPI:   Nadira Perez is a 73 year old year old female  who presents to the pain clinic with chronic low back pain, s/p recent hospitalization after a fall.     Patient Supplied Answers To the  Pain Questionnaire      3/11/2024    12:27 PM    Pain -  Patient Entered Questionnaire/Answers   What number best describes your pain right now:  0 = No pain  to  10 = Worst pain imaginable 5   How would you describe the pain throbbing   Which of the following worsen your pain lying down    standing    walking    exercise   Which of the following improve or reduce your pain sitting    medication    relaxation    thinking about something else   What number best describes your average pain for the past week:  0 = No pain  to  10 = Worst pain imaginable 4   What number best describes your LOWEST pain in past 24 hours:  0 = No pain  to  10 = Worst pain imaginable 2   What number best describes your WORST pain in past 24 hours:  0 = No pain  to  10 = Worst pain imaginable 7   What non-medicine treatments have you already had for your pain pain clinic    acupuncture    surgery    exercise             The patient reports that she had immediate benefit from the caudal GREGOR. Overall from the various injections she has found the caudal GREGOR the most beneficial. She unfortunately had a significant fall in February, after which she was hospitalized. She is still recovering from her hospitalization and slowly increasing her activity.   The patient received wrist steroid injection with Dr. Conley 2/2024. She is planning to hold off caudal GREGOR at this time and will  call the clinic as needed.   She reports that back pain is only 2/10. She reports that her pain score is high due to chest pain from the injury.         Significant Medical History:   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    Thyroid disease           Past Surgical History:  Past Surgical History:   Procedure Laterality Date    ABDOMEN SURGERY      ovarian cyst, mesh    ARTHRODESIS WRIST Right     ARTHRODESIS WRIST  02/14/2013    Procedure: ARTHRODESIS WRIST;  left wrist scaphoid excision, four bone fusion, iliac crest bone graft  ( Mac with block);  Surgeon: Av Mendez MD;  [facility]    BIOPSY      skin, vaginal    BLEPHAROPLASTY BILATERAL Bilateral 05/06/2022    Procedure: UPPER BLEPHAROPLASTY, BILATERAL;  Surgeon: Jemal Sanchez MD;  Location: [facility] OR    CATARACT IOL, RT/LT Right 03/13/2018    CATARACT IOL, RT/LT Left 02/20/2018    COLONOSCOPY  12/24/2013    Procedure: COMBINED COLONOSCOPY, SINGLE BIOPSY/POLYPECTOMY BY BIOPSY;  COLONOSCOPY;  Surgeon: Dom Alvarez MD;  Location:  GI    COSMETIC BLEPHAROPLASTY LOWER LIDS BILATERAL Bilateral 05/06/2022    Procedure: BLEPHAROPLASTY, LOWER EYELID, BILATERAL, COSMETIC;  Surgeon: Jemal Sanchez MD;  Location: [facility] OR    COSMETIC SURGERY      ESOPHAGOSCOPY, GASTROSCOPY, DUODENOSCOPY (EGD), COMBINED N/A 11/23/2016    Procedure: COMBINED ESOPHAGOSCOPY, GASTROSCOPY, DUODENOSCOPY (EGD);  Surgeon: Quinten Feliciano MD;  Location:  GI    EXTERNAL EAR SURGERY      right    EYE  SURGERY      radial keratomy    FUSION, SPINE, INTERBODY, OBLIQUE ANTERIOR AND LUMBAR, 2 LEVELS, POST APPROACH, USING OTS N/A 11/18/2021    Procedure: Part 1: Oblique Anterior Interbody Fusion at Lumbar 5 to sacral 1 with use of Bone Morphogenic Protein,;  Surgeon: Serge Ramirez MD;  Location: [facility] OR    GI SURGERY      Umbilical hernia repair    GRAFT BONE FROM ILIAC CREST  02/14/2013    Procedure: GRAFT BONE FROM ILIAC CREST;  mac with block and local infilitration;  Surgeon: Av Mendez MD;  [facility]     BREATH HYDROGEN TEST N/A 10/14/2016    Procedure: HYDROGEN BREATH TEST;  Surgeon: Cheri Barron MD;  Location: [facility] GI    HERNIA REPAIR      umbilical age 18 mos.    HYSTERECTOMY TOTAL ABDOMINAL  05/03/2000    INJECT EPIDURAL CAUDAL N/A 09/12/2023    Procedure: Caudal epidural steroid injection with fluoroscopy;  Surgeon: Natasha Umaña MD;  Location: [facility] OR    INJECT EPIDURAL CAUDAL N/A 1/2/2024    Procedure: INJECTION, EPIDURAL, CAUDAL;  Surgeon: Natasha Umaña MD;  Location: [facility] OR    INJECT EPIDURAL LUMBAR N/A 05/23/2023    Procedure: L3-4 interlaminar epidural steroid injection with fluoroscopy ( left> right);  Surgeon: Natasha Umaña MD;  Location: [facility] OR    INJECT JOINT SACROILIAC Left 04/27/2023    Procedure: Left sacroiliac joint steroid injection with fluoroscopy;  Surgeon: Natasha Umaña MD;  Location: [facility] OR    MASTECTOMY MODIFIED RADICAL Bilateral     bilateral; right breast prophylactic    OPTICAL TRACKING SYSTEM FUSION POSTERIOR SPINE LUMBAR N/A 11/18/2021    Procedure: Open Posterior Instrumented Spinal Fusion at Lumbar 5 to Sacral 1, with grimm aguayo osteotomy, use of O-Arm/Stealth;  Surgeon: Serge Ramirez MD;  [facility]    PARATHYROIDECTOMY  09/23/2004    R SUPERIOR    PARATHYROIDECTOMY  09/23/2004    parathyroid resection, subtotal    RELEASE CARPAL TUNNEL Right 12/02/2021    Procedure: RIGHT CARPAL TUNNEL RELEASE, RIGHT  WRIST HARDWARE REMOVAL, RIGHT CARPAL BOSS EXCISION;  Surgeon: Rolan Conley MD;  Location: [facility] OR    REMOVE HARDWARE HAND  09/24/2013    Procedure: REMOVE HARDWARE HAND;  Left Hand Screw Removal       RHINOPLASTY  1968    thyr proc skin closed cosmetic manner by subcuticular stitch  01/23/2009    THYROPLASTY  10/09/2009    TONSILLECTOMY  1977    VITRECTOMY PARSPLANA WITH 25 GAUGE SYSTEM Left 06/20/2022    Procedure: LEFT EYE VITRECTOMY, PARS PLANA APPROACH, USING 25-GAUGE INSTRUMENTS, laser;  Surgeon: Felix Carlos MD;  Location: [facility] OR    WRIST SURGERY      wrist arthrodesis          Family History:  Family History   Problem Relation Age of Onset    Neurologic Disorder Mother         Anuerysm of Cerebral Artery, Dementia    Diabetes Mother     Thyroid Disease Mother         Hyperthyroidism (goiter)    Cerebrovascular Disease Mother         Hemorrhagic    Dementia Mother     Osteoporosis Mother     Hyperlipidemia Mother     Heart Disease Father         AAA    Hypertension Father     Coronary Artery Disease Father     Hyperlipidemia Father     Mental Illness Father         Schizophrenia?    Dementia Brother         hydrocephalis    Circulatory Brother         Perihperal Neurophathy    Diabetes Maternal Grandmother         Presumed Type 2    Asthma Maternal Grandmother     Chronic Obstructive Pulmonary Disease Maternal Grandfather         father    Asthma Maternal Grandfather     Diabetes Maternal Aunt         x2    Melanoma Maternal Aunt     Glaucoma Maternal Aunt     Breast Cancer Cousin     Breast Cancer Cousin     Dementia Other     Cancer Other         malignant melanoma    Hypertension Other     Hypertension Other     Cerebrovascular Disease Other     Cerebrovascular Disease Other     Obesity Other     Respiratory Other     Cancer Other     Diabetes Other     Asthma Other     Other Cancer Other         Malignant melanoma    Obesity Other     Macular Degeneration No family hx of     Kidney Disease No  family hx of     Thrombosis No family hx of     Arthritis No family hx of     Depression No family hx of     Substance Abuse No family hx of     Cystic Fibrosis No family hx of     Early Death No family hx of     Coronary Artery Disease Early Onset No family hx of     Heart Failure No family hx of     Bleeding Diathesis No family hx of     Ovarian Cancer No family hx of     Uterine Cancer No family hx of     Prostate Cancer No family hx of     Colorectal Cancer No family hx of     Pancreatic Cancer No family hx of     Lung Cancer No family hx of     Autoimmune Disease No family hx of     Unknown/Adopted No family hx of     Genetic Disorder No family hx of     Bleeding Disorder No family hx of     Clotting Disorder No family hx of     Anesthesia Reaction No family hx of           Social History:  Social History     Socioeconomic History    Marital status:      Spouse name: Not on file    Number of children: Not on file    Years of education: Not on file    Highest education level: Not on file   Occupational History    Not on file   Tobacco Use    Smoking status: Never     Passive exposure: Never    Smokeless tobacco: Never   Vaping Use    Vaping Use: Never used   Substance and Sexual Activity    Alcohol use: Yes     Alcohol/week: 7.0 standard drinks of alcohol     Types: 7 Glasses of wine per week     Comment: Rare if ever    Drug use: Yes     Types: Marijuana     Comment: Smoke through water filter for chronic back pain PRN.    Sexual activity: Not Currently     Partners: Male     Birth control/protection: Post-menopausal, Female Surgical, None   Other Topics Concern     Service No    Blood Transfusions Not Asked    Caffeine Concern No    Occupational Exposure No    Hobby Hazards No    Sleep Concern No    Stress Concern No    Weight Concern No    Special Diet No    Back Care No    Exercise Yes     Comment: walks 4-6x  week for 20-30 min. each    Bike Helmet Not Asked    Seat Belt Yes    Self-Exams  Yes    Parent/sibling w/ CABG, MI or angioplasty before 65F 55M? Yes     Comment: Father   Social History Narrative    .  Recently retired. She has retired from [employer] and hopes to focus on a home care program, [employer],  for the elderly in the future.        She lives with a renter.         She exercises 50 minutes three times a week.     Social Determinants of Health     Financial Resource Strain: Low Risk  (12/19/2023)    Financial Resource Strain     Within the past 12 months, have you or your family members you live with been unable to get utilities (heat, electricity) when it was really needed?: No   Food Insecurity: Low Risk  (12/19/2023)    Food Insecurity     Within the past 12 months, did you worry that your food would run out before you got money to buy more?: No     Within the past 12 months, did the food you bought just not last and you didn t have money to get more?: No   Transportation Needs: Low Risk  (12/19/2023)    Transportation Needs     Within the past 12 months, has lack of transportation kept you from medical appointments, getting your medicines, non-medical meetings or appointments, work, or from getting things that you need?: No   Physical Activity: Not on file   Stress: Not on file   Social Connections: Not on file   Interpersonal Safety: Low Risk  (11/30/2023)    Interpersonal Safety     Do you feel physically and emotionally safe where you currently live?: Yes     Within the past 12 months, have you been hit, slapped, kicked or otherwise physically hurt by someone?: No     Within the past 12 months, have you been humiliated or emotionally abused in other ways by your partner or ex-partner?: No   Housing Stability: Low Risk  (12/19/2023)    Housing Stability     Do you have housing? : Yes     Are you worried about losing your housing?: No     Social History     Social History Narrative    .  Recently retired. 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        She lives with a renter.         She exercises 50 minutes three times a week.          Allergies:  Allergies   Allergen Reactions    Erythromycin Nausea    Penicillins Hives     Around age 6 - doesn't recall full reaction, mother told her it was hives.     Has tolerated cephalsporins.        Current Medications:   Current Outpatient Medications   Medication Sig Dispense Refill    acetaminophen (TYLENOL) 500 MG tablet Take 1,000 mg by mouth 3 times daily      amLODIPine (NORVASC) 5 MG tablet Take 1 tablet (5 mg) by mouth daily 90 tablet 3    Ascorbic Acid (VITAMIN C) 500 MG CHEW Take 1 tablet by mouth daily      atorvastatin (LIPITOR) 80 MG tablet Take 1 tablet (80 mg) by mouth daily 90 tablet 3    bisacodyl (DULCOLAX) 5 MG EC tablet Take 2 tablets at 3 pm the day before your procedure. If your procedure is before 11 am, take 2 additional tablets at 11 pm. If your procedure is after 11 am, take 2 additional tablets at 6 am. For additional instructions refer to your colonoscopy prep instructions. 4 tablet 0    Cholecalciferol (VITAMIN D3) 50 MCG (2000 UT) CAPS TAKE 3 CAPSULES (6,000 UNITS) BY MOUTH DAILY. 270 capsule 3    CRANBERRY EXTRACT PO Take 2 tablets by mouth daily      diclofenac (VOLTAREN) 1 % topical gel Apply 2 g topically 4 times daily 100 g 6    gabapentin (NEURONTIN) 300 MG capsule Take 4 capsules (1,200 mg) by mouth 3 times daily 1080 capsule 3    HEMP OIL OR EXTRACT OR OTHER CBD CANNABINOID, NOT MEDICAL CANNABIS, THC      levothyroxine (SYNTHROID/LEVOTHROID) 112 MCG tablet TAKE 1/2 TAB BY MOUTH EVERY DAY 45 tablet 3    lisinopril (ZESTRIL) 40 MG tablet Take 1 tablet (40 mg) by mouth daily 90 tablet 3    magnesium 250 MG tablet Take 1 tablet by mouth every evening      Melatonin 10 MG TABS tablet Take 10 mg by mouth at bedtime      metoprolol succinate ER (TOPROL XL) 50 MG 24 hr tablet Take 1 tablet (50 mg) by mouth daily 90 tablet 3     "Multiple Vitamin (MULTI-VITAMIN) per tablet Take 1 tablet by mouth daily      ondansetron (ZOFRAN-ODT) 4 MG ODT tab Take 1 tablet (4 mg) by mouth every 12 hours as needed for nausea 180 tablet 3    polyethylene glycol (GOLYTELY) 236 g suspension The night before the exam at 6 pm drink an 8-ounce glass every 15 minutes until the jug is half empty. If you arrive before 11 AM: Drink the other half of the Golytely jug at 11 PM night before procedure. If you arrive after 11 AM: Drink the other half of the Golytely jug at 6 AM day of procedure. For additional instructions refer to your colonoscopy prep instructions. 4000 mL 0    spironolactone (ALDACTONE) 25 MG tablet Take 25 mg by mouth 2 times daily      tiZANidine (ZANAFLEX) 4 MG tablet Take 1 tablet (4 mg) by mouth at bedtime 60 tablet 1       Physical Exam:     /81 (BP Location: Right arm, Patient Position: Chair, Cuff Size: Adult Large)   Pulse 82   Ht 1.676 m (5' 6\")   Wt 86.2 kg (190 lb)   LMP  (LMP Unknown)   SpO2 97%   BMI 30.67 kg/m      General Appearance: No distress, seated comfortably  Mood: Euthymic  HE ENT: Non constricted pupils  Respiratory: Non labored breathing  Skin: No rashes over exposed skin  MS: Normal muscle bulk  Neuro: Cranial nerves 2-12 intact  Gait: non antalgic, ambulates with out assistance      ASSESSMENT AND PLAN:     Encounter Diagnosis:  Lumbar stenosis and neurogenic claudication  Lumbosacral spondylosis  L5/S1 fusion  Spinal stenosis  Peripheral neuropathy       Nadira Perez is a 73 year old year old female  who presents to the pain clinic with low back pain, s/p recent hospitalization. It has been noted that the caudal GREGOR covers her chronic back pain the best thus far. Giong forward we will try to refrain from repeating SI joint and L3-4 GREGOR injection to avoid the side effects of cumulative steroids. D.w patient as she is receiving steroid injections in the wrist. D/w patient to keep a count of annual steroid " injections received.     RECOMMENDATIONS:     1. Medications: no changes    2. Procedure: The patient will call the clinic for caudal GREGOR when she needs an interventions.     3. Education: return to PT appointments.     4. Referral to acupuncture placed again per the patient request.     Follow up: Renée Harris DNP. I paula.w patient that my last day is May 28th and my patients will be transitioned to the providers at the clinic.         Again, thank you for allowing me to participate in the care of your patient.      Sincerely,    Natasha Umaña MD

## 2024-03-11 NOTE — NURSING NOTE
Patient presents with:  Follow Up: Follow-up Lower Back Pain       Severe Pain (6)     Pain Medications       Analgesics Other Refills Start End     acetaminophen (TYLENOL) 500 MG tablet --  --    Sig - Route: Take 1,000 mg by mouth 3 times daily - Oral    Class: Historical            What medications are you using for pain? Tylenol, Advil    (New patients only) Have you been seen by another pain clinic/ provider? no    (Return Patients only) What refills are you needing today? no

## 2024-03-12 ENCOUNTER — HOSPITAL ENCOUNTER (OUTPATIENT)
Facility: CLINIC | Age: 74
Discharge: HOME | End: 2024-03-12
Attending: INTERNAL MEDICINE | Admitting: INTERNAL MEDICINE
Payer: COMMERCIAL

## 2024-03-12 ENCOUNTER — TELEPHONE (OUTPATIENT)
Dept: ANESTHESIOLOGY | Facility: CLINIC | Age: 74
End: 2024-03-12
Payer: COMMERCIAL

## 2024-03-12 ENCOUNTER — ANESTHESIA (OUTPATIENT)
Dept: GASTROENTEROLOGY | Facility: CLINIC | Age: 74
End: 2024-03-12
Payer: COMMERCIAL

## 2024-03-12 VITALS
HEART RATE: 69 BPM | OXYGEN SATURATION: 96 % | RESPIRATION RATE: 15 BRPM | SYSTOLIC BLOOD PRESSURE: 147 MMHG | DIASTOLIC BLOOD PRESSURE: 83 MMHG

## 2024-03-12 DIAGNOSIS — K21.00 GASTROESOPHAGEAL REFLUX DISEASE WITH ESOPHAGITIS WITHOUT HEMORRHAGE: Primary | ICD-10-CM

## 2024-03-12 LAB
COLONOSCOPY: NORMAL
UPPER GI ENDOSCOPY: NORMAL

## 2024-03-12 PROCEDURE — 88305 TISSUE EXAM BY PATHOLOGIST: CPT | Mod: 26 | Performed by: PATHOLOGY

## 2024-03-12 PROCEDURE — 88312 SPECIAL STAINS GROUP 1: CPT | Mod: TC | Performed by: INTERNAL MEDICINE

## 2024-03-12 PROCEDURE — 250N000009 HC RX 250

## 2024-03-12 PROCEDURE — 45385 COLONOSCOPY W/LESION REMOVAL: CPT | Performed by: INTERNAL MEDICINE

## 2024-03-12 PROCEDURE — 250N000011 HC RX IP 250 OP 636: Performed by: REGISTERED NURSE

## 2024-03-12 PROCEDURE — 258N000003 HC RX IP 258 OP 636: Performed by: REGISTERED NURSE

## 2024-03-12 PROCEDURE — 250N000009 HC RX 250: Performed by: REGISTERED NURSE

## 2024-03-12 PROCEDURE — 88312 SPECIAL STAINS GROUP 1: CPT | Mod: 26 | Performed by: PATHOLOGY

## 2024-03-12 PROCEDURE — 99100 ANES PT EXTEME AGE<1 YR&>70: CPT | Performed by: ANESTHESIOLOGY

## 2024-03-12 PROCEDURE — 45385 COLONOSCOPY W/LESION REMOVAL: CPT | Performed by: REGISTERED NURSE

## 2024-03-12 PROCEDURE — 43239 EGD BIOPSY SINGLE/MULTIPLE: CPT | Performed by: INTERNAL MEDICINE

## 2024-03-12 PROCEDURE — 370N000017 HC ANESTHESIA TECHNICAL FEE, PER MIN: Performed by: INTERNAL MEDICINE

## 2024-03-12 PROCEDURE — 45385 COLONOSCOPY W/LESION REMOVAL: CPT | Performed by: ANESTHESIOLOGY

## 2024-03-12 PROCEDURE — 99100 ANES PT EXTEME AGE<1 YR&>70: CPT | Performed by: REGISTERED NURSE

## 2024-03-12 RX ORDER — PROCHLORPERAZINE MALEATE 5 MG
5 TABLET ORAL EVERY 6 HOURS PRN
Status: DISCONTINUED | OUTPATIENT
Start: 2024-03-12 | End: 2024-03-18 | Stop reason: HOSPADM

## 2024-03-12 RX ORDER — ONDANSETRON 4 MG/1
4 TABLET, ORALLY DISINTEGRATING ORAL EVERY 30 MIN PRN
Status: DISCONTINUED | OUTPATIENT
Start: 2024-03-12 | End: 2024-03-18 | Stop reason: HOSPADM

## 2024-03-12 RX ORDER — LIDOCAINE 40 MG/G
CREAM TOPICAL
Status: DISCONTINUED | OUTPATIENT
Start: 2024-03-12 | End: 2024-03-12 | Stop reason: HOSPADM

## 2024-03-12 RX ORDER — NALOXONE HYDROCHLORIDE 0.4 MG/ML
0.4 INJECTION, SOLUTION INTRAMUSCULAR; INTRAVENOUS; SUBCUTANEOUS
Status: DISCONTINUED | OUTPATIENT
Start: 2024-03-12 | End: 2024-03-18 | Stop reason: HOSPADM

## 2024-03-12 RX ORDER — FLUMAZENIL 0.1 MG/ML
0.2 INJECTION, SOLUTION INTRAVENOUS
Status: DISCONTINUED | OUTPATIENT
Start: 2024-03-12 | End: 2024-03-13 | Stop reason: HOSPADM

## 2024-03-12 RX ORDER — NALOXONE HYDROCHLORIDE 0.4 MG/ML
0.2 INJECTION, SOLUTION INTRAMUSCULAR; INTRAVENOUS; SUBCUTANEOUS
Status: DISCONTINUED | OUTPATIENT
Start: 2024-03-12 | End: 2024-03-18 | Stop reason: HOSPADM

## 2024-03-12 RX ORDER — PROPOFOL 10 MG/ML
INJECTION, EMULSION INTRAVENOUS PRN
Status: DISCONTINUED | OUTPATIENT
Start: 2024-03-12 | End: 2024-03-12

## 2024-03-12 RX ORDER — SODIUM CHLORIDE, SODIUM LACTATE, POTASSIUM CHLORIDE, CALCIUM CHLORIDE 600; 310; 30; 20 MG/100ML; MG/100ML; MG/100ML; MG/100ML
INJECTION, SOLUTION INTRAVENOUS CONTINUOUS PRN
Status: DISCONTINUED | OUTPATIENT
Start: 2024-03-12 | End: 2024-03-12

## 2024-03-12 RX ORDER — PROPOFOL 10 MG/ML
INJECTION, EMULSION INTRAVENOUS CONTINUOUS PRN
Status: DISCONTINUED | OUTPATIENT
Start: 2024-03-12 | End: 2024-03-12

## 2024-03-12 RX ORDER — NALOXONE HYDROCHLORIDE 0.4 MG/ML
0.1 INJECTION, SOLUTION INTRAMUSCULAR; INTRAVENOUS; SUBCUTANEOUS
Status: DISCONTINUED | OUTPATIENT
Start: 2024-03-12 | End: 2024-03-18 | Stop reason: HOSPADM

## 2024-03-12 RX ORDER — ONDANSETRON 2 MG/ML
4 INJECTION INTRAMUSCULAR; INTRAVENOUS EVERY 30 MIN PRN
Status: DISCONTINUED | OUTPATIENT
Start: 2024-03-12 | End: 2024-03-18 | Stop reason: HOSPADM

## 2024-03-12 RX ORDER — ONDANSETRON 2 MG/ML
4 INJECTION INTRAMUSCULAR; INTRAVENOUS EVERY 6 HOURS PRN
Status: DISCONTINUED | OUTPATIENT
Start: 2024-03-12 | End: 2024-03-18 | Stop reason: HOSPADM

## 2024-03-12 RX ORDER — ONDANSETRON 4 MG/1
4 TABLET, ORALLY DISINTEGRATING ORAL EVERY 6 HOURS PRN
Status: DISCONTINUED | OUTPATIENT
Start: 2024-03-12 | End: 2024-03-18 | Stop reason: HOSPADM

## 2024-03-12 RX ORDER — LIDOCAINE HYDROCHLORIDE 20 MG/ML
INJECTION, SOLUTION INFILTRATION; PERINEURAL PRN
Status: DISCONTINUED | OUTPATIENT
Start: 2024-03-12 | End: 2024-03-12

## 2024-03-12 RX ORDER — ONDANSETRON 2 MG/ML
4 INJECTION INTRAMUSCULAR; INTRAVENOUS
Status: DISCONTINUED | OUTPATIENT
Start: 2024-03-12 | End: 2024-03-12 | Stop reason: HOSPADM

## 2024-03-12 RX ADMIN — SODIUM CHLORIDE, POTASSIUM CHLORIDE, SODIUM LACTATE AND CALCIUM CHLORIDE: 600; 310; 30; 20 INJECTION, SOLUTION INTRAVENOUS at 13:50

## 2024-03-12 RX ADMIN — PROPOFOL 20 MG: 10 INJECTION, EMULSION INTRAVENOUS at 13:58

## 2024-03-12 RX ADMIN — PROPOFOL 20 MG: 10 INJECTION, EMULSION INTRAVENOUS at 14:36

## 2024-03-12 RX ADMIN — PROPOFOL 20 MG: 10 INJECTION, EMULSION INTRAVENOUS at 14:21

## 2024-03-12 RX ADMIN — TOPICAL ANESTHETIC 1 EACH: 200 SPRAY DENTAL; PERIODONTAL at 13:48

## 2024-03-12 RX ADMIN — LIDOCAINE HYDROCHLORIDE 100 MG: 20 INJECTION, SOLUTION INFILTRATION; PERINEURAL at 13:53

## 2024-03-12 RX ADMIN — PROPOFOL 30 MG: 10 INJECTION, EMULSION INTRAVENOUS at 13:56

## 2024-03-12 RX ADMIN — PROPOFOL 125 MCG/KG/MIN: 10 INJECTION, EMULSION INTRAVENOUS at 13:53

## 2024-03-12 RX ADMIN — PROPOFOL 20 MG: 10 INJECTION, EMULSION INTRAVENOUS at 14:11

## 2024-03-12 ASSESSMENT — ACTIVITIES OF DAILY LIVING (ADL)
ADLS_ACUITY_SCORE: 37

## 2024-03-12 NOTE — OR NURSING
EGD with biopsies and colonoscopy with polypectomy X 4 performed under MAC, patient tolerated well. Transferred to  and report given to recovery RN.   
no abdominal pain/no vomiting

## 2024-03-12 NOTE — TELEPHONE ENCOUNTER
VM left requesting a return call to schedule an appointment with an orthopedic oncologist.    Daja Shankar LPN

## 2024-03-12 NOTE — ANESTHESIA POSTPROCEDURE EVALUATION
Patient: Nadira Perez    Procedure: Procedure(s):  COLONOSCOPY, FLEXIBLE, WITH LESION REMOVAL USING SNARE  ESOPHAGOGASTRODUODENOSCOPY, WITH BIOPSY       Anesthesia Type:  No value filed.    Note:  Disposition: Outpatient   Postop Pain Control: Uneventful            Sign Out: Well controlled pain   PONV: No   Neuro/Psych: Uneventful            Sign Out: Acceptable/Baseline neuro status   Airway/Respiratory: Uneventful            Sign Out: Acceptable/Baseline resp. status   CV/Hemodynamics: Uneventful            Sign Out: Acceptable CV status; No obvious hypovolemia; No obvious fluid overload   Other NRE: NONE   DID A NON-ROUTINE EVENT OCCUR? No           Last vitals:  Vitals Value Taken Time   /83 03/12/24 1510   Temp     Pulse 67 03/12/24 1513   Resp 17 03/12/24 1513   SpO2 95 % 03/12/24 1513   Vitals shown include unfiled device data.    Electronically Signed By: Adi Hoffman MD  March 12, 2024  3:28 PM

## 2024-03-12 NOTE — H&P
Nadira Perez  [id number]  female  73 year old      Reason for procedure/surgery: egd/colonoscopy for heartburn, LLQ pain, recent NSAID use for pain post fall.    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    Corneal opacity    Hypothyroidism    Osteopenia, unspecified location    Aromatase inhibitor use    Chronic pain of right knee    DDD (degenerative disc disease), lumbar    Degenerative scoliosis in adult patient    Carpal tunnel syndrome of right wrist    Peripheral neuropathy    Spinal stenosis of lumbar region with neurogenic claudication    Spondylolisthesis of lumbar region    Brain lesion    Dermatochalasis of both upper eyelids    S/P spinal fusion    Chronic bilateral low back pain    PVD (posterior vitreous detachment), left eye    Vitreous opacities of left eye    Closed nondisplaced fracture of lateral malleolus of left fibula, initial encounter    Acute left ankle pain    Sacroiliitis (H24)    Lumbar radiculopathy    Lumbosacral radiculopathy    Diabetes mellitus, type 2 (H)    Abnormal electrocardiogram    Ejection fraction < 50%    Closed fracture of sternum, unspecified portion of sternum, initial encounter       Past Surgical History:    Past Surgical History:   Procedure Laterality Date    ABDOMEN SURGERY      ovarian cyst, mesh    ARTHRODESIS WRIST Right     ARTHRODESIS WRIST  02/14/2013    Procedure: ARTHRODESIS WRIST;  left wrist scaphoid excision, four bone fusion, iliac crest bone graft  ( Mac with  block);  Surgeon: Av Mendez MD;  [facility]    BIOPSY      skin, vaginal    BLEPHAROPLASTY BILATERAL Bilateral 05/06/2022    Procedure: UPPER BLEPHAROPLASTY, BILATERAL;  Surgeon: Jemal Sanchez MD;  Location: [facility] OR    CATARACT IOL, RT/LT Right 03/13/2018    CATARACT IOL, RT/LT Left 02/20/2018    COLONOSCOPY  12/24/2013    Procedure: COMBINED COLONOSCOPY, SINGLE BIOPSY/POLYPECTOMY BY BIOPSY;  COLONOSCOPY;  Surgeon: Dom Alvarez MD;  Location:  GI    COSMETIC BLEPHAROPLASTY LOWER LIDS BILATERAL Bilateral 05/06/2022    Procedure: BLEPHAROPLASTY, LOWER EYELID, BILATERAL, COSMETIC;  Surgeon: Jemal Sanchez MD;  Location: [facility] OR    COSMETIC SURGERY      ESOPHAGOSCOPY, GASTROSCOPY, DUODENOSCOPY (EGD), COMBINED N/A 11/23/2016    Procedure: COMBINED ESOPHAGOSCOPY, GASTROSCOPY, DUODENOSCOPY (EGD);  Surgeon: Quinten Feliciano MD;  Location:  GI    EXTERNAL EAR SURGERY      right    EYE SURGERY      radial keratomy    FUSION, SPINE, INTERBODY, OBLIQUE ANTERIOR AND LUMBAR, 2 LEVELS, POST APPROACH, USING OTS N/A 11/18/2021    Procedure: Part 1: Oblique Anterior Interbody Fusion at Lumbar 5 to sacral 1 with use of Bone Morphogenic Protein,;  Surgeon: Serge Ramirez MD;  Location:  OR    GI SURGERY      Umbilical hernia repair    GRAFT BONE FROM ILIAC CREST  02/14/2013    Procedure: GRAFT BONE FROM ILIAC CREST;  mac with block and local infilitration;  Surgeon: Av Mendez MD;  Location:  OR    HC BREATH HYDROGEN TEST N/A 10/14/2016    Procedure: HYDROGEN BREATH TEST;  Surgeon: Cheri Barron MD;  Location:  GI    HERNIA REPAIR      umbilical age 18 mos.    HYSTERECTOMY TOTAL ABDOMINAL  05/03/2000    INJECT EPIDURAL CAUDAL N/A 09/12/2023    Procedure: Caudal epidural steroid injection with fluoroscopy;  Surgeon: Natasha Umaña MD;  Location: [facility] OR    INJECT EPIDURAL CAUDAL N/A 1/2/2024    Procedure: INJECTION, EPIDURAL, CAUDAL;  Surgeon: Chasidy  MD Natasha;  Location: [facility] OR    INJECT EPIDURAL LUMBAR N/A 05/23/2023    Procedure: L3-4 interlaminar epidural steroid injection with fluoroscopy ( left> right);  Surgeon: Natasha Umaña MD;  Location: [facility] OR    INJECT JOINT SACROILIAC Left 04/27/2023    Procedure: Left sacroiliac joint steroid injection with fluoroscopy;  Surgeon: Natasha Umaña MD;  Location: [facility] OR    MASTECTOMY MODIFIED RADICAL Bilateral     bilateral; right breast prophylactic    OPTICAL TRACKING SYSTEM FUSION POSTERIOR SPINE LUMBAR N/A 11/18/2021    Procedure: Open Posterior Instrumented Spinal Fusion at Lumbar 5 to Sacral 1, with grimm aguayo osteotomy, use of O-Arm/Stealth;  Surgeon: Serge Ramirez MD;  [facility]    PARATHYROIDECTOMY  09/23/2004    R SUPERIOR    PARATHYROIDECTOMY  09/23/2004    parathyroid resection, subtotal    RELEASE CARPAL TUNNEL Right 12/02/2021    Procedure: RIGHT CARPAL TUNNEL RELEASE, RIGHT WRIST HARDWARE REMOVAL, RIGHT CARPAL BOSS EXCISION;  Surgeon: Rolan Conley MD;  Location: [facility] OR    REMOVE HARDWARE HAND  09/24/2013    Procedure: REMOVE HARDWARE HAND;  Left Hand Screw Removal       RHINOPLASTY  1968    thyr proc skin closed cosmetic manner by subcuticular stitch  01/23/2009    THYROPLASTY  10/09/2009    TONSILLECTOMY  1977    VITRECTOMY PARSPLANA WITH 25 GAUGE SYSTEM Left 06/20/2022    Procedure: LEFT EYE VITRECTOMY, PARS PLANA APPROACH, USING 25-GAUGE INSTRUMENTS, laser;  Surgeon: Felix Carlos MD;  Location: [facility] OR    WRIST SURGERY      wrist arthrodesis       Past Medical History:   Past Medical History:   Diagnosis Date    Arthritis     Bone disease     Breast cancer (H)     Diabetes (H)     H/O kyphoplasty     Hearing problem     History of blood transfusion     History of kidney stones     History of radiation therapy     Hyperlipemia     Hypertension     Hypopotassemia     Irregular heart beat     Kidney problem     Lymph edema     Medullary sponge kidney      Osteopenia     PONV (postoperative nausea and vomiting)     Reduced vision     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    Thyroid disease        Social History:   Social History     Tobacco Use    Smoking status: Never     Passive exposure: Never    Smokeless tobacco: Never   Substance Use Topics    Alcohol use: Yes     Alcohol/week: 7.0 standard drinks of alcohol     Types: 7 Glasses of wine per week     Comment: Rare if ever       Family History:   Family History   Problem Relation Age of Onset    Neurologic Disorder Mother         Anuerysm of Cerebral Artery, Dementia    Diabetes Mother     Thyroid Disease Mother         Hyperthyroidism (goiter)    Cerebrovascular Disease Mother         Hemorrhagic    Dementia Mother     Osteoporosis Mother     Hyperlipidemia Mother     Heart Disease Father         AAA    Hypertension Father     Coronary Artery Disease Father     Hyperlipidemia Father     Mental Illness Father         Schizophrenia?    Dementia Brother         hydrocephalis    Circulatory Brother         Perihperal Neurophathy    Diabetes Maternal Grandmother         Presumed Type 2    Asthma Maternal Grandmother     Chronic Obstructive Pulmonary Disease Maternal Grandfather         father    Asthma Maternal Grandfather     Diabetes Maternal Aunt         x2    Melanoma Maternal Aunt     Glaucoma Maternal Aunt     Breast Cancer Cousin     Breast Cancer Cousin     Dementia Other     Cancer Other         malignant melanoma    Hypertension Other     Hypertension Other     Cerebrovascular Disease Other     Cerebrovascular Disease Other     Obesity Other     Respiratory Other     Cancer Other     Diabetes Other     Asthma Other     Other Cancer Other         Malignant melanoma    Obesity Other     Macular Degeneration No family hx of     Kidney Disease No family hx of     Thrombosis No family hx of     Arthritis No family hx of     Depression No family hx of     Substance Abuse No family hx of     Cystic  Fibrosis No family hx of     Early Death No family hx of     Coronary Artery Disease Early Onset No family hx of     Heart Failure No family hx of     Bleeding Diathesis No family hx of     Ovarian Cancer No family hx of     Uterine Cancer No family hx of     Prostate Cancer No family hx of     Colorectal Cancer No family hx of     Pancreatic Cancer No family hx of     Lung Cancer No family hx of     Autoimmune Disease No family hx of     Unknown/Adopted No family hx of     Genetic Disorder No family hx of     Bleeding Disorder No family hx of     Clotting Disorder No family hx of     Anesthesia Reaction No family hx of        Allergies:   Allergies   Allergen Reactions    Erythromycin Nausea    Penicillins Hives     Around age 6 - doesn't recall full reaction, mother told her it was hives.     Has tolerated cephalsporins.        Active Medications:   No current outpatient medications on file.       Systemic Review:   CONSTITUTIONAL: NEGATIVE for fever, chills, change in weight  ENT/MOUTH: NEGATIVE for ear, mouth and throat problems  RESP: NEGATIVE for significant cough or SOB  CV: NEGATIVE for chest pain, palpitations or peripheral edema    Physical Examination:   Vital Signs: There were no vitals taken for this visit.  GENERAL: healthy, alert and no distress  NECK: no adenopathy, no asymmetry, masses, or scars  RESP: lungs clear to auscultation - no rales, rhonchi or wheezes  CV: regular rate and rhythm, normal S1 S2, no S3 or S4, no murmur, click or rub, no peripheral edema and peripheral pulses strong  ABDOMEN: soft, nontender, no hepatosplenomegaly, no masses and bowel sounds normal  MS: no gross musculoskeletal defects noted, no edema    Plan: Appropriate to proceed as scheduled.      Amina Espinoza MD  3/12/2024    PCP:  Matilde Quiñonez

## 2024-03-12 NOTE — TELEPHONE ENCOUNTER
Sent [employer] (1st Attempt) for the patient to call back and schedule the following:    Appointment type: follow up  Provider: Dr. Umaña  Return date: 2 months  Specialty phone number: [phone]  Additional appointment(s) needed: NA  Additonal Notes: NA

## 2024-03-12 NOTE — PROGRESS NOTES
HPI: Patient is a 73 year old female with a PMH most remarkable for essential HTN and breast cancer (status post surgery, chemo, and radiation; now in remission). She also has frequent PVCs. She has been following with Dr. Rolle who referred her for evaluation of PVCs.    Patient tells me that she recently was admitted to [facility] with a fall and sternal fracture. Troponin was measured which led to an echocardiogram. Her echocardiogram showed a decreased LVEF. She was optimized on GDMT and discharged. She then followed up with Dr. Rolle and her LVEF was improving. It was Dr. Rolle's impression that this was stress induced.    She mentions she had PVCs for 15-20 years, not very symptomatic. The PVCs are mostly monomorphic but has 2 morphologies. She never had an MRI yet. EKG Nov 9th 2023 PVCs look AMC origin, sometimes more negative in V1, liely due to lead placement variation      PAST MEDICAL HISTORY:  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    Thyroid disease        CURRENT MEDICATIONS:  No current outpatient medications on file.       PAST SURGICAL HISTORY:  Past Surgical History:   Procedure Laterality Date    ABDOMEN SURGERY      ovarian cyst, mesh    ARTHRODESIS WRIST Right     ARTHRODESIS WRIST  02/14/2013    Procedure: ARTHRODESIS WRIST;  left wrist scaphoid excision, four bone fusion, iliac crest bone graft  ( Mac with block);  Surgeon: Av Mendez MD;  [facility]    BIOPSY      skin, vaginal    BLEPHAROPLASTY  BILATERAL Bilateral 05/06/2022    Procedure: UPPER BLEPHAROPLASTY, BILATERAL;  Surgeon: Jemal Sanchez MD;  Location: [facility] OR    CATARACT IOL, RT/LT Right 03/13/2018    CATARACT IOL, RT/LT Left 02/20/2018    COLONOSCOPY  12/24/2013    Procedure: COMBINED COLONOSCOPY, SINGLE BIOPSY/POLYPECTOMY BY BIOPSY;  COLONOSCOPY;  Surgeon: Dom Alvarez MD;  Location:  GI    COSMETIC BLEPHAROPLASTY LOWER LIDS BILATERAL Bilateral 05/06/2022    Procedure: BLEPHAROPLASTY, LOWER EYELID, BILATERAL, COSMETIC;  Surgeon: Jemal Sanchez MD;  Location: [facility]    COSMETIC SURGERY      ESOPHAGOSCOPY, GASTROSCOPY, DUODENOSCOPY (EGD), COMBINED N/A 11/23/2016    Procedure: COMBINED ESOPHAGOSCOPY, GASTROSCOPY, DUODENOSCOPY (EGD);  Surgeon: Quinten Feliciano MD;  Location:  GI    EXTERNAL EAR SURGERY      right    EYE SURGERY      radial keratomy    FUSION, SPINE, INTERBODY, OBLIQUE ANTERIOR AND LUMBAR, 2 LEVELS, POST APPROACH, USING OTS N/A 11/18/2021    Procedure: Part 1: Oblique Anterior Interbody Fusion at Lumbar 5 to sacral 1 with use of Bone Morphogenic Protein,;  Surgeon: Serge Ramirez MD;  Location:  OR    GI SURGERY      Umbilical hernia repair    GRAFT BONE FROM ILIAC CREST  02/14/2013    Procedure: GRAFT BONE FROM ILIAC CREST;  mac with block and local infilitration;  Surgeon: Av Mendez MD;  Location:  OR     BREATH HYDROGEN TEST N/A 10/14/2016    Procedure: HYDROGEN BREATH TEST;  Surgeon: Cheri Barron MD;  Location:  GI    HERNIA REPAIR      umbilical age 18 mos.    HYSTERECTOMY TOTAL ABDOMINAL  05/03/2000    INJECT EPIDURAL CAUDAL N/A 09/12/2023    Procedure: Caudal epidural steroid injection with fluoroscopy;  Surgeon: Natasha Umaña MD;  Location: [facility] OR    INJECT EPIDURAL CAUDAL N/A 1/2/2024    Procedure: INJECTION, EPIDURAL, CAUDAL;  Surgeon: Natasha Umaña MD;  Location: [facility] OR    INJECT EPIDURAL LUMBAR N/A 05/23/2023    Procedure: L3-4 interlaminar epidural  steroid injection with fluoroscopy ( left> right);  Surgeon: Natasha Umaña MD;  Location: [facility] OR    INJECT JOINT SACROILIAC Left 04/27/2023    Procedure: Left sacroiliac joint steroid injection with fluoroscopy;  Surgeon: Natasha Umaña MD;  Location: [facility] OR    MASTECTOMY MODIFIED RADICAL Bilateral     bilateral; right breast prophylactic    OPTICAL TRACKING SYSTEM FUSION POSTERIOR SPINE LUMBAR N/A 11/18/2021    Procedure: Open Posterior Instrumented Spinal Fusion at Lumbar 5 to Sacral 1, with grimm aguayo osteotomy, use of O-Arm/Stealth;  Surgeon: Serge Ramirez MD;  [facility]    PARATHYROIDECTOMY  09/23/2004    R SUPERIOR    PARATHYROIDECTOMY  09/23/2004    parathyroid resection, subtotal    RELEASE CARPAL TUNNEL Right 12/02/2021    Procedure: RIGHT CARPAL TUNNEL RELEASE, RIGHT WRIST HARDWARE REMOVAL, RIGHT CARPAL BOSS EXCISION;  Surgeon: Rolan Conley MD;  Location: [facility] OR    REMOVE HARDWARE HAND  09/24/2013    Procedure: REMOVE HARDWARE HAND;  Left Hand Screw Removal       RHINOPLASTY  1968    thyr proc skin closed cosmetic manner by subcuticular stitch  01/23/2009    THYROPLASTY  10/09/2009    TONSILLECTOMY  1977    VITRECTOMY PARSPLANA WITH 25 GAUGE SYSTEM Left 06/20/2022    Procedure: LEFT EYE VITRECTOMY, PARS PLANA APPROACH, USING 25-GAUGE INSTRUMENTS, laser;  Surgeon: Felix Carlos MD;  Location: [facility] OR    WRIST SURGERY      wrist arthrodesis       ALLERGIES:     Allergies   Allergen Reactions    Erythromycin Nausea    Penicillins Hives     Around age 6 - doesn't recall full reaction, mother told her it was hives.     Has tolerated cephalsporins.        FAMILY HISTORY:  No Premature coronary artery disease  No Atrial fibrillation  No Sudden cardiac death     SOCIAL HISTORY:  Social History     Tobacco Use    Smoking status: Never     Passive exposure: Never    Smokeless tobacco: Never   Vaping Use    Vaping Use: Never used   Substance Use Topics    Alcohol use: Yes      Alcohol/week: 7.0 standard drinks of alcohol     Types: 7 Glasses of wine per week     Comment: Rare if ever    Drug use: Yes     Types: Marijuana     Comment: Smoke through water filter for chronic back pain PRN.       ROS:   Negative besides HPI and PMH.    Exam:  /81 (BP Location: Right arm, Patient Position: Chair, Cuff Size: Adult Regular)   Pulse 88   Wt 86.1 kg (189 lb 14.4 oz)   LMP  (LMP Unknown)   SpO2 95%   BMI 30.65 kg/m    GENERAL APPEARANCE: healthy, alert and no distress      Labs:  CBC RESULTS:   Lab Results   Component Value Date    WBC 10.3 02/17/2024    WBC 7.9 05/07/2021    RBC 4.08 02/17/2024    RBC 4.04 05/07/2021    HGB 11.9 02/17/2024    HGB 11.5 (L) 05/07/2021    HCT 37.3 02/17/2024    HCT 36.8 05/07/2021    MCV 91 02/17/2024    MCV 91 05/07/2021    MCH 29.2 02/17/2024    MCH 28.5 05/07/2021    MCHC 31.9 02/17/2024    MCHC 31.3 (L) 05/07/2021    RDW 15.2 (H) 02/17/2024    RDW 14.5 05/07/2021     02/17/2024     05/07/2021       BMP RESULTS:  Lab Results   Component Value Date     02/17/2024     05/07/2021     05/07/2021    POTASSIUM 4.5 02/17/2024    POTASSIUM 4.3 07/16/2022    POTASSIUM 2.6 (LL) 11/18/2021    POTASSIUM 3.2 (L) 05/07/2021    POTASSIUM 3.3 (L) 05/07/2021    CHLORIDE 103 02/17/2024    CHLORIDE 108 01/12/2022    CHLORIDE 105 05/07/2021    CHLORIDE 107 05/07/2021    CO2 25 02/17/2024    CO2 27 01/12/2022    CO2 31 05/07/2021    CO2 30 05/07/2021    ANIONGAP 12 02/17/2024    ANIONGAP 9 01/12/2022    ANIONGAP 4 05/07/2021    ANIONGAP 5 05/07/2021    GLC 98 03/07/2024    GLC 96 01/12/2022    GLC 99 05/07/2021    GLC 98 05/07/2021    BUN 24.1 (H) 02/17/2024    BUN 19 01/12/2022    BUN 21 05/07/2021    BUN 22 05/07/2021    CR 0.78 02/17/2024    CR 0.73 05/07/2021    CR 0.74 05/07/2021    GFRESTIMATED 81 02/17/2024    GFRESTIMATED 53 (L) 02/16/2024    GFRESTIMATED 85 05/07/2021    GFRESTIMATED 82 05/07/2021    GFRESTBLACK >90 05/07/2021     GFRESTBLACK >90 05/07/2021    RAMBO 9.3 02/17/2024    RAMBO 9.6 05/07/2021    RAMBO 9.5 05/07/2021        INR RESULTS:  Lab Results   Component Value Date    INR 0.85 (L) 08/20/2010    INR 0.91 09/30/2008    INR 0.93 04/24/2008    INR 0.98 11/16/2007       Procedures:    Echo from 3/6/2024  Left ventricular function is decreased. The ejection fraction is 50-55% (borderline).  The left ventricular function has improved.    Echo from 2/16/2024  The left ventricle is mildly dilated.  The visual ejection fraction is estimated at 35%.  The right ventricle is normal in structure, function and size.  Aortic root dilatation is present.  TDS- fractured sternum. Fat pad vs. effusion(small)  Clinical correlation required The study was technically difficult. Compared to prior study, changes are noted.    Ziopatch from 11/14/23 - 11/21/23        ECG from 12/2023 with PVC      Assessment and Plan:     Impression:  Frequent Monomorphic PVCs; High Outflow Location  Cardiomyopathy; probably stress induced  Breast Cancer in Remission    Her PVCs, albeit relatively frequent, are mildy to non symptomatic, chronic, unlikely related to her cardiomyopathy that is more recent than her PVCs and that recovered with similar PVC burden.     Recommendations  No need for intervention  CMR to appropriate etiology of previous low EF and to investigate inf leads Q waves  Follow-up with Dr Rolle in 6 months    Total time spent on patient visit, reviewing notes, imaging, labs, orders, and completing necessary documentation: 45 minutes.  >50% of visit spent on counseling patient and/or coordination of care.    Kvng Lyon MD [facility]  Cardiology - Electrophysiology

## 2024-03-12 NOTE — ANESTHESIA CARE TRANSFER NOTE
Patient: Nadira Perez    Procedure: Procedure(s):  COLONOSCOPY, FLEXIBLE, WITH LESION REMOVAL USING SNARE  ESOPHAGOGASTRODUODENOSCOPY, WITH BIOPSY       Diagnosis: Screen for colon cancer [Z12.11]  Diagnosis Additional Information: No value filed.    Anesthesia Type:   No value filed.     Note:    Oropharynx: oropharynx clear of all foreign objects and spontaneously breathing  Level of Consciousness: awake  Oxygen Supplementation: nasal cannula  Level of Supplemental Oxygen (L/min / FiO2): 2  Independent Airway: airway patency satisfactory and stable  Dentition: dentition unchanged  Vital Signs Stable: post-procedure vital signs reviewed and stable  Report to RN Given: handoff report given  Patient transferred to: Phase II    Handoff Report: Identifed the Patient, Identified the Reponsible Provider, Reviewed the pertinent medical history, Discussed the surgical course, Reviewed Intra-OP anesthesia mangement and issues during anesthesia, Set expectations for post-procedure period and Allowed opportunity for questions and acknowledgement of understanding  Vitals:  Vitals Value Taken Time   BP     Temp     Pulse 72 03/12/24 1446   Resp 17 03/12/24 1446   SpO2 99 % 03/12/24 1446   Vitals shown include unfiled device data.    Electronically Signed By: MERCEDES Hawkins CRNA  March 12, 2024  2:46 PM

## 2024-03-13 ENCOUNTER — OFFICE VISIT (OUTPATIENT)
Dept: CARDIOLOGY | Facility: CLINIC | Age: 74
End: 2024-03-13
Attending: INTERNAL MEDICINE
Payer: COMMERCIAL

## 2024-03-13 VITALS
BODY MASS INDEX: 30.65 KG/M2 | OXYGEN SATURATION: 95 % | HEART RATE: 88 BPM | DIASTOLIC BLOOD PRESSURE: 81 MMHG | WEIGHT: 189.9 LBS | SYSTOLIC BLOOD PRESSURE: 131 MMHG

## 2024-03-13 DIAGNOSIS — I49.3 FREQUENT PVCS: ICD-10-CM

## 2024-03-13 PROCEDURE — 93010 ELECTROCARDIOGRAM REPORT: CPT | Mod: RTG | Performed by: INTERNAL MEDICINE

## 2024-03-13 PROCEDURE — G0463 HOSPITAL OUTPT CLINIC VISIT: HCPCS | Performed by: INTERNAL MEDICINE

## 2024-03-13 PROCEDURE — 99214 OFFICE O/P EST MOD 30 MIN: CPT | Performed by: INTERNAL MEDICINE

## 2024-03-13 PROCEDURE — 93005 ELECTROCARDIOGRAM TRACING: CPT

## 2024-03-13 ASSESSMENT — ACTIVITIES OF DAILY LIVING (ADL)
ADLS_ACUITY_SCORE: 37

## 2024-03-13 ASSESSMENT — PAIN SCALES - GENERAL: PAINLEVEL: NO PAIN (0)

## 2024-03-13 NOTE — PROGRESS NOTES
"[facility]    Blood Pressure   {BP < 140/90:2714216::\"BP < 140/90 Yes\"}    On Aspirin  {On Aspirin Yes/No:9882555::\"Yes\"}    On Statin  {On Statin Yes/No:8939021::\"Yes\"}    Tobacco use  {Tobacco use Yes/No:0413515::\"No\"}    "

## 2024-03-13 NOTE — PATIENT INSTRUCTIONS
Plan:   Cardiac MRI  Follow-up in 6 months with Dr Rolle      Your Care Team:  [facility]   Telephone Number     Ciera Lawler RN [phone]    After business hours: [phone], ask for cardiologist on-call   Non-procedure scheduling:    Jazmine MUHAMMAD   [phone]   Procedure scheduling:    Supriya Mosley   [phone]   Device Clinic (Pacemakers, ICDs, Loop Recorders)    During business hours: [phone]  After business hours:   [phone]- select option 4 and ask for job code [id number].       Cardiovascular Clinic:   [address] [place] [address]. [place] [address]      As always, thank you for trusting us with your health care needs!

## 2024-03-13 NOTE — NURSING NOTE
Chief Complaint   Patient presents with    New Patient     PVC consult per Dr Rolle       Vitals were taken, medications reconciled, and EKG was performed.    Stewart Goodrich, EMT  12:50 PM

## 2024-03-13 NOTE — TELEPHONE ENCOUNTER
NATALIE left requesting a return call to schedule an appointment with an orthopedic oncologist. My chart will be sent as well.     Daja Shankar LPN

## 2024-03-13 NOTE — TELEPHONE ENCOUNTER
Letter sent both via regular and certified mail [phone].    Daja Shankar LPN

## 2024-03-13 NOTE — LETTER
3/13/2024      RE: Nadira Perez  [address]  [facility] [address]       Dear Colleague,    Thank you for the opportunity to participate in the care of your patient, Nadira Perez, at the [facility] [place] [facility]. Please see a copy of my visit note below.    HPI: Patient is a 73 year old female with a PMH most remarkable for essential HTN and breast cancer (status post surgery, chemo, and radiation; now in remission). She also has frequent PVCs. She has been following with Dr. Rolle who referred her for evaluation of PVCs.    Patient tells me that she recently was admitted to [facility] with a fall and sternal fracture. Troponin was measured which led to an echocardiogram. Her echocardiogram showed a decreased LVEF. She was optimized on GDMT and discharged. She then followed up with Dr. Rolle and her LVEF was improving. It was Dr. Rolle's impression that this was stress induced.    She mentions she had PVCs for 15-20 years, not very symptomatic. The PVCs are mostly monomorphic but has 2 morphologies. She never had an MRI yet. EKG Nov 9th 2023 PVCs look AMC origin, sometimes more negative in V1, liely due to lead placement variation      PAST MEDICAL HISTORY:  Past Medical History:   Diagnosis Date     Arthritis      Bone disease      Breast cancer (H)      Diabetes (H)      H/O kyphoplasty      Hearing problem      History of blood transfusion      History of kidney stones      History of radiation therapy      Hyperlipemia      Hypertension      Hypopotassemia      Irregular heart beat      Kidney problem      Lymph edema      Medullary sponge kidney      Osteopenia      PONV (postoperative nausea and vomiting)      Reduced vision      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       PAST SURGICAL HISTORY:  Past Surgical  History:   Procedure Laterality Date     ABDOMEN SURGERY      ovarian cyst, mesh     ARTHRODESIS WRIST Right      ARTHRODESIS WRIST  02/14/2013    Procedure: ARTHRODESIS WRIST;  left wrist scaphoid excision, four bone fusion, iliac crest bone graft  ( Mac with block);  Surgeon: Av Mendez MD;  Location: [facility] OR     BIOPSY      skin, vaginal     BLEPHAROPLASTY BILATERAL Bilateral 05/06/2022    Procedure: UPPER BLEPHAROPLASTY, BILATERAL;  Surgeon: Jemal Sanchez MD;  Location: [facility] OR     CATARACT IOL, RT/LT Right 03/13/2018     CATARACT IOL, RT/LT Left 02/20/2018     COLONOSCOPY  12/24/2013    Procedure: COMBINED COLONOSCOPY, SINGLE BIOPSY/POLYPECTOMY BY BIOPSY;  COLONOSCOPY;  Surgeon: Dom Alvarez MD;  Location:  GI     COSMETIC BLEPHAROPLASTY LOWER LIDS BILATERAL Bilateral 05/06/2022    Procedure: BLEPHAROPLASTY, LOWER EYELID, BILATERAL, COSMETIC;  Surgeon: Jemal Sanchez MD;  Location: [facility] OR     COSMETIC SURGERY       ESOPHAGOSCOPY, GASTROSCOPY, DUODENOSCOPY (EGD), COMBINED N/A 11/23/2016    Procedure: COMBINED ESOPHAGOSCOPY, GASTROSCOPY, DUODENOSCOPY (EGD);  Surgeon: Quinten Feliciano MD;  Location:  GI     EXTERNAL EAR SURGERY      right     EYE SURGERY      radial keratomy     FUSION, SPINE, INTERBODY, OBLIQUE ANTERIOR AND LUMBAR, 2 LEVELS, POST APPROACH, USING OTS N/A 11/18/2021    Procedure: Part 1: Oblique Anterior Interbody Fusion at Lumbar 5 to sacral 1 with use of Bone Morphogenic Protein,;  Surgeon: Serge Ramirez MD;  Location:  OR     GI SURGERY      Umbilical hernia repair     GRAFT BONE FROM ILIAC CREST  02/14/2013    Procedure: GRAFT BONE FROM ILIAC CREST;  mac with block and local infilitration;  Surgeon: Av Mendez MD;  Location:  OR     HC BREATH HYDROGEN TEST N/A 10/14/2016    Procedure: HYDROGEN BREATH TEST;  Surgeon: Cheri Barron MD;  Location:  GI     HERNIA REPAIR      umbilical age 18 mos.     HYSTERECTOMY TOTAL  ABDOMINAL  05/03/2000     INJECT EPIDURAL CAUDAL N/A 09/12/2023    Procedure: Caudal epidural steroid injection with fluoroscopy;  Surgeon: Natasha Umaña MD;  Location: [facility] OR     INJECT EPIDURAL CAUDAL N/A 1/2/2024    Procedure: INJECTION, EPIDURAL, CAUDAL;  Surgeon: Natasha Umaña MD;  Location: [facility] OR     INJECT EPIDURAL LUMBAR N/A 05/23/2023    Procedure: L3-4 interlaminar epidural steroid injection with fluoroscopy ( left> right);  Surgeon: Natasha Umaña MD;  Location: [facility] OR     INJECT JOINT SACROILIAC Left 04/27/2023    Procedure: Left sacroiliac joint steroid injection with fluoroscopy;  Surgeon: Natasha Umaña MD;  Location: [facility] OR     MASTECTOMY MODIFIED RADICAL Bilateral     bilateral; right breast prophylactic     OPTICAL TRACKING SYSTEM FUSION POSTERIOR SPINE LUMBAR N/A 11/18/2021    Procedure: Open Posterior Instrumented Spinal Fusion at Lumbar 5 to Sacral 1, with grimm aguayo osteotomy, use of O-Arm/Stealth;  Surgeon: Serge Ramirez MD;  Location: [facility] OR     PARATHYROIDECTOMY  09/23/2004    R SUPERIOR     PARATHYROIDECTOMY  09/23/2004    parathyroid resection, subtotal     RELEASE CARPAL TUNNEL Right 12/02/2021    Procedure: RIGHT CARPAL TUNNEL RELEASE, RIGHT WRIST HARDWARE REMOVAL, RIGHT CARPAL BOSS EXCISION;  Surgeon: Rolan Conley MD;  Location: [facility] OR     REMOVE HARDWARE HAND  09/24/2013    Procedure: REMOVE HARDWARE HAND;  Left Hand Screw Removal        RHINOPLASTY  1968     thyr proc skin closed cosmetic manner by subcuticular stitch  01/23/2009     THYROPLASTY  10/09/2009     TONSILLECTOMY  1977     VITRECTOMY PARSPLANA WITH 25 GAUGE SYSTEM Left 06/20/2022    Procedure: LEFT EYE VITRECTOMY, PARS PLANA APPROACH, USING 25-GAUGE INSTRUMENTS, laser;  Surgeon: Felix Carlos MD;  Location: [facility] OR     WRIST SURGERY      wrist arthrodesis       ALLERGIES:     Allergies   Allergen Reactions     Erythromycin Nausea     Penicillins Hives     Around age 6 - doesn't recall  full reaction, mother told her it was hives.     Has tolerated cephalsporins.        FAMILY HISTORY:  No Premature coronary artery disease  No Atrial fibrillation  No Sudden cardiac death     SOCIAL HISTORY:  Social History     Tobacco Use     Smoking status: Never     Passive exposure: Never     Smokeless tobacco: Never   Vaping Use     Vaping Use: Never used   Substance Use Topics     Alcohol use: Yes     Alcohol/week: 7.0 standard drinks of alcohol     Types: 7 Glasses of wine per week     Comment: Rare if ever     Drug use: Yes     Types: Marijuana     Comment: Smoke through water filter for chronic back pain PRN.       ROS:   Negative besides HPI and PMH.    Exam:  /81 (BP Location: Right arm, Patient Position: Chair, Cuff Size: Adult Regular)   Pulse 88   Wt 86.1 kg (189 lb 14.4 oz)   LMP  (LMP Unknown)   SpO2 95%   BMI 30.65 kg/m    GENERAL APPEARANCE: healthy, alert and no distress      Labs:  CBC RESULTS:   Lab Results   Component Value Date    WBC 10.3 02/17/2024    WBC 7.9 05/07/2021    RBC 4.08 02/17/2024    RBC 4.04 05/07/2021    HGB 11.9 02/17/2024    HGB 11.5 (L) 05/07/2021    HCT 37.3 02/17/2024    HCT 36.8 05/07/2021    MCV 91 02/17/2024    MCV 91 05/07/2021    MCH 29.2 02/17/2024    MCH 28.5 05/07/2021    MCHC 31.9 02/17/2024    MCHC 31.3 (L) 05/07/2021    RDW 15.2 (H) 02/17/2024    RDW 14.5 05/07/2021     02/17/2024     05/07/2021       BMP RESULTS:  Lab Results   Component Value Date     02/17/2024     05/07/2021     05/07/2021    POTASSIUM 4.5 02/17/2024    POTASSIUM 4.3 07/16/2022    POTASSIUM 2.6 (LL) 11/18/2021    POTASSIUM 3.2 (L) 05/07/2021    POTASSIUM 3.3 (L) 05/07/2021    CHLORIDE 103 02/17/2024    CHLORIDE 108 01/12/2022    CHLORIDE 105 05/07/2021    CHLORIDE 107 05/07/2021    CO2 25 02/17/2024    CO2 27 01/12/2022    CO2 31 05/07/2021    CO2 30 05/07/2021    ANIONGAP 12 02/17/2024    ANIONGAP 9 01/12/2022    ANIONGAP 4 05/07/2021    ANIONGAP 5  05/07/2021    GLC 98 03/07/2024    GLC 96 01/12/2022    GLC 99 05/07/2021    GLC 98 05/07/2021    BUN 24.1 (H) 02/17/2024    BUN 19 01/12/2022    BUN 21 05/07/2021    BUN 22 05/07/2021    CR 0.78 02/17/2024    CR 0.73 05/07/2021    CR 0.74 05/07/2021    GFRESTIMATED 81 02/17/2024    GFRESTIMATED 53 (L) 02/16/2024    GFRESTIMATED 85 05/07/2021    GFRESTIMATED 82 05/07/2021    GFRESTBLACK >90 05/07/2021    GFRESTBLACK >90 05/07/2021    RAMBO 9.3 02/17/2024    RAMBO 9.6 05/07/2021    RAMBO 9.5 05/07/2021        INR RESULTS:  Lab Results   Component Value Date    INR 0.85 (L) 08/20/2010    INR 0.91 09/30/2008    INR 0.93 04/24/2008    INR 0.98 11/16/2007       Procedures:    Echo from 3/6/2024  Left ventricular function is decreased. The ejection fraction is 50-55% (borderline).  The left ventricular function has improved.    Echo from 2/16/2024  The left ventricle is mildly dilated.  The visual ejection fraction is estimated at 35%.  The right ventricle is normal in structure, function and size.  Aortic root dilatation is present.  TDS- fractured sternum. Fat pad vs. effusion(small)  Clinical correlation required The study was technically difficult. Compared to prior study, changes are noted.    Ziopatch from 11/14/23 - 11/21/23        ECG from 12/2023 with PVC      Assessment and Plan:     Impression:  Frequent Monomorphic PVCs; High Outflow Location  Cardiomyopathy; probably stress induced  Breast Cancer in Remission    Her PVCs, albeit relatively frequent, are mildy to non symptomatic, chronic, unlikely related to her cardiomyopathy that is more recent than her PVCs and that recovered with similar PVC burden.     Recommendations  No need for intervention  CMR to appropriate etiology of previous low EF and to investigate inf leads Q waves  Follow-up with Dr Rolle in 6 months    Total time spent on patient visit, reviewing notes, imaging, labs, orders, and completing necessary documentation: 45 minutes.  >50% of visit spent  on counseling patient and/or coordination of care.    Kvng Lyon MD [facility]  Cardiology - Electrophysiology          Please do not hesitate to contact me if you have any questions/concerns.     Sincerely,     Kvng Lyon MD

## 2024-03-14 LAB
ATRIAL RATE - MUSE: 88 BPM
DIASTOLIC BLOOD PRESSURE - MUSE: NORMAL MMHG
INTERPRETATION ECG - MUSE: NORMAL
P AXIS - MUSE: 46 DEGREES
PATH REPORT.ADDENDUM SPEC: NORMAL
PATH REPORT.COMMENTS IMP SPEC: NORMAL
PATH REPORT.COMMENTS IMP SPEC: NORMAL
PATH REPORT.FINAL DX SPEC: NORMAL
PATH REPORT.GROSS SPEC: NORMAL
PATH REPORT.MICROSCOPIC SPEC OTHER STN: NORMAL
PATH REPORT.RELEVANT HX SPEC: NORMAL
PHOTO IMAGE: NORMAL
PR INTERVAL - MUSE: 160 MS
QRS DURATION - MUSE: 118 MS
QT - MUSE: 404 MS
QTC - MUSE: 488 MS
R AXIS - MUSE: 244 DEGREES
SYSTOLIC BLOOD PRESSURE - MUSE: NORMAL MMHG
T AXIS - MUSE: 33 DEGREES
VENTRICULAR RATE- MUSE: 88 BPM

## 2024-03-14 ASSESSMENT — ACTIVITIES OF DAILY LIVING (ADL)
ADLS_ACUITY_SCORE: 37

## 2024-03-15 ENCOUNTER — ALLIED HEALTH/NURSE VISIT (OUTPATIENT)
Dept: PHARMACY | Facility: CLINIC | Age: 74
End: 2024-03-15
Payer: COMMERCIAL

## 2024-03-15 ENCOUNTER — MYC MEDICAL ADVICE (OUTPATIENT)
Dept: CARDIOLOGY | Facility: CLINIC | Age: 74
End: 2024-03-15
Payer: COMMERCIAL

## 2024-03-15 VITALS — HEART RATE: 84 BPM | SYSTOLIC BLOOD PRESSURE: 136 MMHG | OXYGEN SATURATION: 96 % | DIASTOLIC BLOOD PRESSURE: 82 MMHG

## 2024-03-15 DIAGNOSIS — E78.00 PURE HYPERCHOLESTEROLEMIA: ICD-10-CM

## 2024-03-15 DIAGNOSIS — G47.00 INSOMNIA, UNSPECIFIED TYPE: ICD-10-CM

## 2024-03-15 DIAGNOSIS — I10 BENIGN ESSENTIAL HYPERTENSION: Primary | ICD-10-CM

## 2024-03-15 DIAGNOSIS — Z78.9 TAKES DIETARY SUPPLEMENTS: ICD-10-CM

## 2024-03-15 PROCEDURE — 99607 MTMS BY PHARM ADDL 15 MIN: CPT | Performed by: PHARMACIST

## 2024-03-15 PROCEDURE — 99605 MTMS BY PHARM NP 15 MIN: CPT | Performed by: PHARMACIST

## 2024-03-15 ASSESSMENT — ACTIVITIES OF DAILY LIVING (ADL)
ADLS_ACUITY_SCORE: 37

## 2024-03-15 NOTE — LETTER
"Recommended To-Do List      Prepared on: 03/19/2024       You can get the best results from your medications by completing the items on this \"To-Do List.\"      Bring your To-Do List when you go to your doctor. And, share it with your family or caregivers.    My To-Do List:  What we talked about: What I should do:   A new medication that may be of benefit to you    Start taking hydrochlorothiazide 12.5 mg once daily          What we talked about: What I should do:   An issue with your medication    Stop taking doxylamine          What we talked about: What I should do:   A medication that is not working    Change the medication you are taking from magnesium oxide to magnesium glycinate          What we talked about: What I should do:                     "

## 2024-03-15 NOTE — PATIENT INSTRUCTIONS
"Recommendations from today's MTM visit:                                                    MTM (medication therapy management) is a service provided by a clinical pharmacist designed to help you get the most of out of your medicines.   Today we reviewed what your medicines are for, how to know if they are working, that your medicines are safe and how to make your medicine regimen as easy as possible.    Start magnesium glycinate 200 mg daily  Avoid using doxylamine succinate for sleep  Start hydrochlorothiazide 12.5 mg once daily    Follow-up: Return in about 9 weeks (around 5/17/2024) for Follow up, with me.    It was great speaking with you today.  I value your experience and would be very thankful for your time in providing feedback in our clinic survey. In the next few days, you may receive an email or text message from [employer] with a link to a survey related to your  clinical pharmacist.\"     To schedule another MTM appointment, please call the clinic directly or you may call the MTM scheduling line at [phone] or toll-free at [phone].     My Clinical Pharmacist's contact information:                                                      Please feel free to contact me with any questions or concerns you have.      Willis So, Pharm. D., BCACP  Medication Therapy Management Pharmacist    "

## 2024-03-15 NOTE — PROGRESS NOTES
Medication Therapy Management (MTM) Encounter    ASSESSMENT:                            Medication Adherence/Access: No issues identified    Hypertension:   Blood pressure is not at goal of <130/80 mmHg  Patient would benefit from adding hydrochlorothiazide for blood pressure control and relief from edema. Would not increase amlodipine due to edema effect. Could also consider increasing spironolactone, but would want to watch potassium levels.     Hyperlipidemia:   Lifestyle changes are reasonable at his time.    Insomnia:   Patient could be harmed from routine use of doxylamine succinate for sleep. Adults over 65 are at higher risk of side effects such as confusion, blurry vision, urinary retention and excess sedation.    Supplements:   Patient would benefit from a magnesium glycinate formulation to assist with sleep.    PLAN:                            Start magnesium glycinate 200 mg daily  Avoid using doxylamine succinate for sleep  Start hydrochlorothiazide 12.5 mg once daily    Follow-up: Return in about 9 weeks (around 5/17/2024) for Follow up, with me.    SUBJECTIVE/OBJECTIVE:                          Ismael Perez is a 73 year old female coming in for a follow-up visit.       Reason for visit: CMR.    Allergies/ADRs: needs to be assessed  Past Medical History: Reviewed in chart  Tobacco: She reports that she has never smoked. She has never been exposed to tobacco smoke. She has never used smokeless tobacco.  Alcohol: rarely - needs to be assessed    Medication Adherence/Access: no issues reported    Hypertension   Amlodipine 5 mg daily  Lisinopril 40 mg daily  Metoprolol succinate ER 50 mg daily   Spironolactone 25 mg twice daily  Patient reports the following medication side effects: edema  Patient self monitors blood pressure.  Home BP monitoring 162/104 mmHg, 140/93 mmHg, 133/89 mmHg .       BP Readings from Last 3 Encounters:   03/15/24 136/82   03/13/24 131/81   03/12/24 (!) 147/83     Pulse Readings  from Last 3 Encounters:   03/15/24 84   03/13/24 88   03/12/24 69     Patient was interested in alternative blood pressure due to edema likely from amlodipine. She inquired about potentially stopping spironolactone and starting hydrochlorothiazide. She is slightly concerned about potassium going too high    Hyperlipidemia   Atorvastatin 80 mg daily  The 10-year ASCVD risk score (Nicole THAKKAR, et al., 2019) is: 28.4%    Values used to calculate the score:      Age: 73 years      Sex: Female      Is Non- : No      Diabetic: Yes      Tobacco smoker: No      Systolic Blood Pressure: 136 mmHg      Is BP treated: Yes      HDL Cholesterol: 56 mg/dL      Total Cholesterol: 214 mg/dL       Recent Labs   Lab Test 03/07/24  1451 04/15/22  1632   CHOL 214* 167   HDL 56 67   * 71   TRIG 237* 145     Patient plans to make dietary changes to address cholesterol levels.    Insomnia:   Melatonin 10 mg daily  Tizanidine 4 mg daily  Doxylamine succinate 25 mg daily as needed    Patient reported being able to fall asleep when taking doxylamine succinate. She stopped taking after being told it was not safe.    Supplements:   Ascorbic acid 500 mg daily  Vitamin D3 4000 units daily - decreased from 6000 units possibly due to a high lab  Cranberry extract daily  Magnesium Oxide 400 mg daily  Multivitamin daily    Patient is interested in which formulation of magnesium would be best for sleep.    Today's Vitals: /82   Pulse 84   LMP  (LMP Unknown)   SpO2 96%   ----------------  Post Discharge Medication Reconciliation Status: discharge medications reconciled, continue medications without change.    I spent 30 minutes with this patient today. All changes were made via collaborative practice agreement with MERCEDES Kyle CNP. A copy of the visit note was provided to the patient's provider(s).    A summary of these recommendations was given to the patient.    Tyson Paul  [employer] Student  Pharmacist (4th Year)    Preceptor Cosignature: I have reviewed and verified the student's documentation.    Willis So, Pharm. D., BCACP  Medication Therapy Management Pharmacist  Direct Voicemail: [phone]       Medication Therapy Recommendations  Benign essential hypertension    Current Medication: spironolactone (ALDACTONE) 25 MG tablet   Rationale: Synergistic therapy - Needs additional medication therapy - Indication   Recommendation: Start Medication   Status: Accepted per CPA         Insomnia, unspecified type    Rationale: Unsafe medication for the patient - Adverse medication event - Safety   Recommendation: Discontinue Medication   Status: Accepted - no CPA Needed         Takes dietary supplements    Current Medication: magnesium 250 MG tablet   Rationale: More effective medication available - Ineffective medication - Effectiveness   Recommendation: Change Medication - magnesium glycinate 100 MG Caps capsule   Status: Accepted - no CPA Needed

## 2024-03-15 NOTE — LETTER
March 19, 2024  Nadira Perez  [address]  [facility] [address]    Dear Ms. Perez, [facility]     Thank you for talking with me on Mar 15, 2024 about your health and medications. As a follow-up to our conversation, I have included two documents:      Your Recommended To-Do List has steps you should take to get the best results from your medications.  Your Medication List will help you keep track of your medications and how to take them.    If you want to talk about these documents, please call Willis So RPH at phone: [phone], Monday-Friday 8-4:30pm.    I look forward to working with you and your doctors to make sure your medications work well for you.    Sincerely,  Willis So RPH  [facility] Pharmacist, [facility]

## 2024-03-15 NOTE — TELEPHONE ENCOUNTER
Patient saw Dr. Rolle on 3/6/24. Patient was to follow up on how her weight and swelling were doing. [employer] message sent to patient.    Sandy Calvillo, RN, BSN  Cardiology RN Care Coordinator   [place]   Phone: [phone]  Fax: [phone] ([place]) [phone] ([place])

## 2024-03-15 NOTE — Clinical Note
Working to help patient on her blood pressure. We discussed her starting hydrochlorothiazide in tandem with her other blood pressure medications. She wanted me to run this by you as well.   Please let me know what questions you have for me,  Willis So, Pharm. D., [facility] Medication Therapy Management Pharmacist Direct Voicemail: [phone]

## 2024-03-15 NOTE — LETTER
_  Medication List        Prepared on: 03/19/2024     Bring your Medication List when you go to the doctor, hospital, or   emergency room. And, share it with your family or caregivers.     Note any changes to how you take your medications.  Cross out medications when you no longer use them.    Medication How I take it Why I use it Prescriber   acetaminophen (TYLENOL) 500 MG tablet Take 1,000 mg by mouth 3 times daily  Pain Patient Reported   amLODIPine (NORVASC) 5 MG tablet Take 1 tablet (5 mg) by mouth daily Benign Essential Hypertension MERCEDES Rivera CNP   Ascorbic Acid (VITAMIN C) 500 MG CHEW Take 1 tablet by mouth daily  supplement Patient Reported   atorvastatin (LIPITOR) 80 MG tablet Take 1 tablet (80 mg) by mouth daily Pure Hypercholesterolemia MERCEDES Rivera CNP   Cholecalciferol (VITAMIN D3) 50 MCG (2000 UT) CAPS TAKE 3 CAPSULES (6,000 UNITS) BY MOUTH DAILY. Hypovitaminosis D MERCEDES Rivera CNP   CRANBERRY EXTRACT PO Take 2 tablets by mouth daily  Supplement Patient Reported   diclofenac (VOLTAREN) 1 % topical gel Apply 2 g topically 4 times daily Primary osteoarthritis of both wrists Rolan Conley MD   gabapentin (NEURONTIN) 300 MG capsule Take 4 capsules (1,200 mg) by mouth 3 times daily Neuropathic pain MERCEDES Rivera CNP   HEMP OIL OR EXTRACT OR OTHER CBD CANNABINOID, NOT MEDICAL CANNABIS, THC  Supplement Patient Reported   hydroCHLOROthiazide 12.5 MG tablet Take 1 tablet (12.5 mg) by mouth daily Benign Essential Hypertension MERCEDES Rivera CNP   levothyroxine (SYNTHROID/LEVOTHROID) 112 MCG tablet TAKE 1/2 TAB BY MOUTH EVERY DAY Hypothyroidism, unspecified type MERCEDES Rivera CNP   lisinopril (ZESTRIL) 40 MG tablet Take 1 tablet (40 mg) by mouth daily Benign Essential Hypertension MERCEDES Rivera CNP   magnesium 250 MG tablet Take 400 mg by mouth every evening  Sleep Patient Reported   Melatonin 10 MG TABS tablet Take 10 mg by mouth at bedtime   Sleep Patient Reported   metoprolol succinate ER (TOPROL XL) 50 MG 24 hr tablet Take 1 tablet (50 mg) by mouth daily At risk for cardiomyopathy; Essential Hypertension, Benign Radha Rolle MD   Multiple Vitamin (MULTI-VITAMIN) per tablet Take 1 tablet by mouth daily  Supplement Patient Reported   omeprazole (PRILOSEC) 20 MG DR capsule Take 1 capsule (20 mg) by mouth daily for 90 days Gastroesophageal reflux disease with esophagitis without hemorrhage Amina Espinoza MD   ondansetron (ZOFRAN-ODT) 4 MG ODT tab Take 1 tablet (4 mg) by mouth every 12 hours as needed for nausea Vertigo MERCEDES Rivera CNP   romosozumab-aqqg (EVENITY) injection 210 mg  Injected Subcutaneously monthly Pathological fracture of vertebra due to age-related osteoporosis with delayed healing, subsequent encounter Cortney Clark MD   spironolactone (ALDACTONE) 25 MG tablet Take 25 mg by mouth 2 times daily  Blood pressure  Patient reported   tiZANidine (ZANAFLEX) 4 MG tablet Take 1 tablet (4 mg) by mouth at bedtime Sprain of interphalangeal joint of left great toe, initial encounter MERCEDES Rivera CNP         Add new medications, over-the-counter drugs, herbals, vitamins, or  minerals in the blank rows below.    Medication How I take it Why I use it Prescriber                                      Allergies:      erythromycin; penicillins        Side effects I have had:               Other Information:              My notes and questions:

## 2024-03-16 ASSESSMENT — ACTIVITIES OF DAILY LIVING (ADL)
ADLS_ACUITY_SCORE: 37

## 2024-03-17 ASSESSMENT — ACTIVITIES OF DAILY LIVING (ADL)
ADLS_ACUITY_SCORE: 37

## 2024-03-18 NOTE — TELEPHONE ENCOUNTER
Patient reached out to me via phone and we scheduled an appointment for 3/25.    Daja Shankar LPN     ,

## 2024-03-19 RX ORDER — HYDROCHLOROTHIAZIDE 12.5 MG/1
12.5 TABLET ORAL DAILY
Qty: 30 TABLET | Refills: 3 | Status: SHIPPED | OUTPATIENT
Start: 2024-03-19 | End: 2024-04-16

## 2024-03-19 NOTE — TELEPHONE ENCOUNTER
Attempted to call patient. Left message for patient.    Sandy Calvillo, RN, BSN  Cardiology RN Care Coordinator   [place]   Phone: [phone]  Fax: [phone] ([place]) [phone] ([place])

## 2024-03-19 NOTE — TELEPHONE ENCOUNTER
VM left requesting a return call to schedule MRI before visit with Dr. Cee.     Daja Shankar LPN

## 2024-03-20 NOTE — TELEPHONE ENCOUNTER
Patient calling returning call priority stated to send TE, please call patient to advise,thank you.  Thank you!  [facility]  ARTURO JHA on 3/20/2024 at 8:46 AM

## 2024-03-21 NOTE — TELEPHONE ENCOUNTER
Radha Rolle MD17 hours ago (3:28 PM)     IC  No new recommendations.  Sounds good.      CAN         Note      You  Radha Rolle MD21 hours ago (12:03 PM)     CK  Patient sent in her BP reads and says her swelling has improved. PCP recommended starting hydrochlorothiazide in addition to spironolactone and PCP sent prescription. She is going to monitor and see if it helps her bps and symptoms. Let me know if you advise anything else.     [employer] message sent to patient.    Sandy Calvillo RN, BSN  Cardiology RN Care Coordinator   [place]   Phone: [phone]  Fax: [phone] ([place]) [phone] ([place])

## 2024-03-21 NOTE — TELEPHONE ENCOUNTER
Radha Rolle MD57 minutes ago (4:04 PM)     IC  Patient's primary care physician ordered hydrochlorothiazide.     Usually when patient is on spironolactone they do not need a lot of potassium supplement.  I am seeing a standing order for basic metabolic panel from PCP.  I think she needs to schedule that in 2 weeks or so.     If her potassium is still low at that point then she might start potassium supplement.     DR LINNEA Alvarez message sent to patient.    Sandy Calvillo, RN, BSN  Cardiology RN Care Coordinator   [place]   Phone: [phone]  Fax: [phone] ([place]) [phone] ([place])

## 2024-03-21 NOTE — TELEPHONE ENCOUNTER
Patient's primary care physician ordered hydrochlorothiazide.    Usually when patient is on spironolactone they do not need a lot of potassium supplement.  I am seeing a standing order for basic metabolic panel from PCP.  I think she needs to schedule that in 2 weeks or so.    If her potassium is still low at that point then she might start potassium supplement.    DR YARBROUGH

## 2024-03-22 NOTE — TELEPHONE ENCOUNTER
Patient saw [facility] [employer] message. No response at this time.    Sandy Calvillo, RN, BSN  Cardiology RN Care Coordinator   [place]   Phone: [phone]  Fax: [phone] ([place]) [phone] ([place])

## 2024-03-22 NOTE — PROGRESS NOTES
[facility], Orthopaedic Oncology Surgery Consultation  by Rolan Cee M.D.    Nadira Perez MRN# [id number]    YOB: 1950     Requesting physician: Matilde Guerrero            Assessment and Plan:   Assessment:  Right leg subcutaneous mass, differential diagnosis includes venous anomaly, small muscle herniation or subcutaneous cyst.  Highly unlikely represent any type of neoplasm despite history of breast carcinoma.      Plan:  Advised nonsurgical management.  Patient notes that the AFO brace she wears sometimes is problematic.  I suggested that she consider working with her orthotist to adjust the position of her strap to avoid problems.  She may even widen the strap to completely cover the subcutaneous mass to the maintains compression on the area.    Return on a as needed basis.           History of Present Illness:   73 year old female  chief complaint    This 73-year-old woman is seen today for evaluation of a mass on the anterior aspect of her right leg.  She has a history of breast carcinoma in the past and presently is on numerous medications that have resulted in some lower extremity edema.  She also has a foot drop on the right side as a consequence of prior back surgery for which she wears an AFO.  Of note has been the development of subcutaneous mass along the anterior aspect of her leg that is causing problems and interference with her AFO fitting.      Current symptoms:  Problem: right leg mass  Onset and duration: over a year again  Awakens from sleep due to sx's:  No  Precipitating Injury:  No    Other joints or sites painful:  Yes, back   Fever: No  Appetite change or weight loss: Yes, for weight due to water retention   History of prior or existing cancer: Yes, hx bilateral breast cancer     Background history:  DX:  History of breast carcinoma    TREATMENTS:  8/26/2004, Excisional biopsy on low back, ( Dr. Horowitz)   11/20/2007, Left modified  radical mastectomy, right simple mastectomy, ( Dr. Mei) [facility]            Physical Exam:     EXAMINATION pertinent findings:   PSYCH: Pleasant, healthy-appearing, alert, oriented x3, cooperative. Normal mood and affect.  VITAL SIGNS: not currently breastfeeding..  Reviewed nursing intake notes.   There is no height or weight on file to calculate BMI.  RESP: non labored breathing   ABD: benign, soft, non-tender, no acute peritoneal findings  SKIN: grossly normal   LYMPHATIC: grossly normal, no adenopathy, no extremity edema  NEURO: grossly normal , no motor deficits  VASCULAR: satisfactory perfusion of all extremities   MUSCULOSKELETAL:   Palpable subcutaneous mass on the anterior medial aspect of the right proximal tibial crest.  Compresses and dissipates with compression over the entire area for several seconds.  Nonpulsatile.  Nonfluctuant.  No erythema and slight tenderness.           Data:   All laboratory data reviewed  All imaging studies reviewed by me    MRI examinations reviewed.  No evidence of any solid neoplasm or concern for metastasis.      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    Corneal opacity    Hypothyroidism    Osteopenia, unspecified location    Aromatase inhibitor use    Chronic pain of right knee    DDD (degenerative disc disease), lumbar    Degenerative scoliosis in adult patient    Carpal tunnel syndrome of right wrist     Peripheral neuropathy    Spinal stenosis of lumbar region with neurogenic claudication    Spondylolisthesis of lumbar region    Brain lesion    Dermatochalasis of both upper eyelids    S/P spinal fusion    Chronic bilateral low back pain    PVD (posterior vitreous detachment), left eye    Vitreous opacities of left eye    Closed nondisplaced fracture of lateral malleolus of left fibula, initial encounter    Acute left ankle pain    Sacroiliitis (H24)    Lumbar radiculopathy    Lumbosacral radiculopathy    Diabetes mellitus, type 2 (H)    Abnormal electrocardiogram    Ejection fraction < 50%    Closed fracture of sternum, unspecified portion of sternum, initial encounter     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    Thyroid disease        Also see scanned health assessment forms.       Past Surgical History:     Past Surgical History:   Procedure Laterality Date    ABDOMEN SURGERY      ovarian cyst, mesh    ARTHRODESIS WRIST Right     ARTHRODESIS WRIST  02/14/2013    Procedure: ARTHRODESIS WRIST;  left wrist scaphoid excision, four bone fusion, iliac crest bone graft  ( Mac with block);  Surgeon: Av Mendez MD;  [facility]    BIOPSY      skin, vaginal    BLEPHAROPLASTY BILATERAL Bilateral 05/06/2022    Procedure: UPPER BLEPHAROPLASTY, BILATERAL;  Surgeon: Jemal Sancehz MD;  [place] [facility] [place]    CATARACT IOL, RT/LT Right 03/13/2018    CATARACT IOL, RT/LT Left 02/20/2018    COLONOSCOPY  12/24/2013    Procedure: COMBINED  COLONOSCOPY, SINGLE BIOPSY/POLYPECTOMY BY BIOPSY;  COLONOSCOPY;  Surgeon: Dom Alvarez MD;  Location:  GI    COLONOSCOPY N/A 3/12/2024    Procedure: COLONOSCOPY, FLEXIBLE, WITH LESION REMOVAL USING SNARE;  Surgeon: Amina Espinoza MD;  Location:  GI    COSMETIC BLEPHAROPLASTY LOWER LIDS BILATERAL Bilateral 05/06/2022    Procedure: BLEPHAROPLASTY, LOWER EYELID, BILATERAL, COSMETIC;  Surgeon: Jemal Sanchez MD;  [place] [facility] [place]    COSMETIC SURGERY      ESOPHAGOSCOPY, GASTROSCOPY, DUODENOSCOPY (EGD), COMBINED N/A 11/23/2016    Procedure: COMBINED ESOPHAGOSCOPY, GASTROSCOPY, DUODENOSCOPY (EGD);  Surgeon: Quinten Feliciano MD;  Location:  GI    ESOPHAGOSCOPY, GASTROSCOPY, DUODENOSCOPY (EGD), COMBINED N/A 3/12/2024    Procedure: ESOPHAGOGASTRODUODENOSCOPY, WITH BIOPSY;  Surgeon: Amina Espinoza MD;  Location: [facility]    EXTERNAL EAR SURGERY      right    EYE SURGERY      radial keratomy    FUSION, SPINE, INTERBODY, OBLIQUE ANTERIOR AND LUMBAR, 2 LEVELS, POST APPROACH, USING OTS N/A 11/18/2021    Procedure: Part 1: Oblique Anterior Interbody Fusion at Lumbar 5 to sacral 1 with use of Bone Morphogenic Protein,;  Surgeon: Serge Ramirez MD;  [facility]    GI SURGERY      Umbilical hernia repair    GRAFT BONE FROM ILIAC CREST  02/14/2013    Procedure: GRAFT BONE FROM ILIAC CREST;  mac with block and local infilitration;  Surgeon: Av Mendez MD;  [facility]     BREATH HYDROGEN TEST N/A 10/14/2016    Procedure: HYDROGEN BREATH TEST;  Surgeon: Cheri Barron MD;  Location:  GI    HERNIA REPAIR      umbilical age 18 mos.    HYSTERECTOMY TOTAL ABDOMINAL  05/03/2000    INJECT EPIDURAL CAUDAL N/A 09/12/2023    Procedure: Caudal epidural steroid injection with fluoroscopy;  Surgeon: Natasha Umaña MD;  Location: [facility] OR    INJECT EPIDURAL CAUDAL N/A 1/2/2024    Procedure: INJECTION, EPIDURAL, CAUDAL;  Surgeon: Natasha Umaña MD;  Location: [facility] OR    INJECT EPIDURAL  LUMBAR N/A 05/23/2023    Procedure: L3-4 interlaminar epidural steroid injection with fluoroscopy ( left> right);  Surgeon: Natasha Umaña MD;  Location: [facility] OR    INJECT JOINT SACROILIAC Left 04/27/2023    Procedure: Left sacroiliac joint steroid injection with fluoroscopy;  Surgeon: Natasha Umaña MD;  Location: [facility] OR    MASTECTOMY MODIFIED RADICAL Bilateral     bilateral; right breast prophylactic    OPTICAL TRACKING SYSTEM FUSION POSTERIOR SPINE LUMBAR N/A 11/18/2021    Procedure: Open Posterior Instrumented Spinal Fusion at Lumbar 5 to Sacral 1, with grimm aguayo osteotomy, use of O-Arm/Stealth;  Surgeon: Serge Ramirez MD;  [facility]    PARATHYROIDECTOMY  09/23/2004    R SUPERIOR    PARATHYROIDECTOMY  09/23/2004    parathyroid resection, subtotal    RELEASE CARPAL TUNNEL Right 12/02/2021    Procedure: RIGHT CARPAL TUNNEL RELEASE, RIGHT WRIST HARDWARE REMOVAL, RIGHT CARPAL BOSS EXCISION;  Surgeon: Rolan Conley MD;  Location: [facility] OR    REMOVE HARDWARE HAND  09/24/2013    Procedure: REMOVE HARDWARE HAND;  Left Hand Screw Removal       RHINOPLASTY  1968    thyr proc skin closed cosmetic manner by subcuticular stitch  01/23/2009    THYROPLASTY  10/09/2009    TONSILLECTOMY  1977    VITRECTOMY PARSPLANA WITH 25 GAUGE SYSTEM Left 06/20/2022    Procedure: LEFT EYE VITRECTOMY, PARS PLANA APPROACH, USING 25-GAUGE INSTRUMENTS, laser;  Surgeon: Felix Carlos MD;  Location: [facility] OR    WRIST SURGERY      wrist arthrodesis            Social History:     Social History     Socioeconomic History    Marital status:      Spouse name: Not on file    Number of children: Not on file    Years of education: Not on file    Highest education level: Not on file   Occupational History    Not on file   Tobacco Use    Smoking status: Never     Passive exposure: Never    Smokeless tobacco: Never   Vaping Use    Vaping Use: Never used   Substance and Sexual Activity    Alcohol use: Yes      Alcohol/week: 7.0 standard drinks of alcohol     Types: 7 Glasses of wine per week     Comment: Rare if ever    Drug use: Yes     Types: Marijuana     Comment: Smoke through water filter for chronic back pain PRN.    Sexual activity: Not Currently     Partners: Male     Birth control/protection: Post-menopausal, Female Surgical, None   Other Topics Concern     Service No    Blood Transfusions Not Asked    Caffeine Concern No    Occupational Exposure No    Hobby Hazards No    Sleep Concern No    Stress Concern No    Weight Concern No    Special Diet No    Back Care No    Exercise Yes     Comment: walks 4-6x  week for 20-30 min. each    Bike Helmet Not Asked    Seat Belt Yes    Self-Exams Yes    Parent/sibling w/ CABG, MI or angioplasty before 65F 55M? Yes     Comment: Father   Social History Narrative    .  Recently retired. 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        She lives with a renter.         She exercises 50 minutes three times a week.     Social Determinants of Health     Financial Resource Strain: Low Risk  (12/19/2023)    Financial Resource Strain     Within the past 12 months, have you or your family members you live with been unable to get utilities (heat, electricity) when it was really needed?: No   Food Insecurity: Low Risk  (12/19/2023)    Food Insecurity     Within the past 12 months, did you worry that your food would run out before you got money to buy more?: No     Within the past 12 months, did the food you bought just not last and you didn t have money to get more?: No   Transportation Needs: Low Risk  (12/19/2023)    Transportation Needs     Within the past 12 months, has lack of transportation kept you from medical appointments, getting your medicines, non-medical meetings or appointments, work, or from getting things that you need?: No   Physical Activity: Not on file   Stress: Not on file    Social Connections: Not on file   Interpersonal Safety: Low Risk  (11/30/2023)    Interpersonal Safety     Do you feel physically and emotionally safe where you currently live?: Yes     Within the past 12 months, have you been hit, slapped, kicked or otherwise physically hurt by someone?: No     Within the past 12 months, have you been humiliated or emotionally abused in other ways by your partner or ex-partner?: No   Housing Stability: Low Risk  (12/19/2023)    Housing Stability     Do you have housing? : Yes     Are you worried about losing your housing?: No            Family History:       Family History   Problem Relation Age of Onset    Neurologic Disorder Mother         Anuerysm of Cerebral Artery, Dementia    Diabetes Mother     Thyroid Disease Mother         Hyperthyroidism (goiter)    Cerebrovascular Disease Mother         Hemorrhagic    Dementia Mother     Osteoporosis Mother     Hyperlipidemia Mother     Heart Disease Father         AAA    Hypertension Father     Coronary Artery Disease Father     Hyperlipidemia Father     Mental Illness Father         Schizophrenia?    Dementia Brother         hydrocephalis    Circulatory Brother         Perihperal Neurophathy    Diabetes Maternal Grandmother         Presumed Type 2    Asthma Maternal Grandmother     Chronic Obstructive Pulmonary Disease Maternal Grandfather         father    Asthma Maternal Grandfather     Diabetes Maternal Aunt         x2    Melanoma Maternal Aunt     Glaucoma Maternal Aunt     Breast Cancer Cousin     Breast Cancer Cousin     Dementia Other     Cancer Other         malignant melanoma    Hypertension Other     Hypertension Other     Cerebrovascular Disease Other     Cerebrovascular Disease Other     Obesity Other     Respiratory Other     Cancer Other     Diabetes Other     Asthma Other     Other Cancer Other         Malignant melanoma    Obesity Other     Macular Degeneration No family hx of     Kidney Disease No family hx of      Thrombosis No family hx of     Arthritis No family hx of     Depression No family hx of     Substance Abuse No family hx of     Cystic Fibrosis No family hx of     Early Death No family hx of     Coronary Artery Disease Early Onset No family hx of     Heart Failure No family hx of     Bleeding Diathesis No family hx of     Ovarian Cancer No family hx of     Uterine Cancer No family hx of     Prostate Cancer No family hx of     Colorectal Cancer No family hx of     Pancreatic Cancer No family hx of     Lung Cancer No family hx of     Autoimmune Disease No family hx of     Unknown/Adopted No family hx of     Genetic Disorder No family hx of     Bleeding Disorder No family hx of     Clotting Disorder No family hx of     Anesthesia Reaction No family hx of             Medications:     Current Outpatient Medications   Medication Sig    acetaminophen (TYLENOL) 500 MG tablet Take 1,000 mg by mouth 3 times daily    amLODIPine (NORVASC) 5 MG tablet Take 1 tablet (5 mg) by mouth daily    Ascorbic Acid (VITAMIN C) 500 MG CHEW Take 1 tablet by mouth daily    atorvastatin (LIPITOR) 80 MG tablet Take 1 tablet (80 mg) by mouth daily    Cholecalciferol (VITAMIN D3) 50 MCG (2000 UT) CAPS TAKE 3 CAPSULES (6,000 UNITS) BY MOUTH DAILY. (Patient taking differently: Take 4,000 Units by mouth daily)    CRANBERRY EXTRACT PO Take 2 tablets by mouth daily    diclofenac (VOLTAREN) 1 % topical gel Apply 2 g topically 4 times daily    gabapentin (NEURONTIN) 300 MG capsule Take 4 capsules (1,200 mg) by mouth 3 times daily    HEMP OIL OR EXTRACT OR OTHER CBD CANNABINOID, NOT MEDICAL CANNABIS, THC    hydroCHLOROthiazide 12.5 MG tablet Take 1 tablet (12.5 mg) by mouth daily    levothyroxine (SYNTHROID/LEVOTHROID) 112 MCG tablet TAKE 1/2 TAB BY MOUTH EVERY DAY    lisinopril (ZESTRIL) 40 MG tablet Take 1 tablet (40 mg) by mouth daily    magnesium 250 MG tablet Take 400 mg by mouth every evening    Melatonin 10 MG TABS tablet Take 10 mg by mouth at  bedtime    metoprolol succinate ER (TOPROL XL) 50 MG 24 hr tablet Take 1 tablet (50 mg) by mouth daily    Multiple Vitamin (MULTI-VITAMIN) per tablet Take 1 tablet by mouth daily    omeprazole (PRILOSEC) 20 MG DR capsule Take 1 capsule (20 mg) by mouth daily for 90 days    ondansetron (ZOFRAN-ODT) 4 MG ODT tab Take 1 tablet (4 mg) by mouth every 12 hours as needed for nausea    spironolactone (ALDACTONE) 25 MG tablet Take 25 mg by mouth 2 times daily    tiZANidine (ZANAFLEX) 4 MG tablet Take 1 tablet (4 mg) by mouth at bedtime     Current Facility-Administered Medications   Medication    lidocaine 1 % injection 1 mL    lidocaine 1 % injection 1 mL    romosozumab-aqqg (EVENITY) injection 210 mg    triamcinolone (KENALOG-40) injection 40 mg              Review of Systems:   A comprehensive 10 point review of systems (constitutional, ENT, cardiac, peripheral vascular, lymphatic, respiratory, GI, , Musculoskeletal, skin, Neurological) was performed and found to be negative except as described in this note.     See intake form completed by patient

## 2024-03-29 NOTE — TELEPHONE ENCOUNTER
DIAGNOSIS: RIGHT LEG MASS   APPOINTMENT DATE: 04/01/2024   NOTES STATUS DETAILS   OFFICE NOTE from referring provider Internal 03/07/2024 - Matilde Quiñonez CNP - [facility] Internal med   OFFICE NOTE from other specialist Internal 02/15/2024 - Carmen Centeno MD - [facility] Physical Medicine and Rehabilitation - Oncology    01/30/2024 - Jennifer Jauregui PT - [facility] Physical Therapy   (IMAGES & REPORTS) Internal

## 2024-03-31 ENCOUNTER — HOSPITAL ENCOUNTER (OUTPATIENT)
Dept: MRI IMAGING | Facility: CLINIC | Age: 74
Discharge: HOME | End: 2024-03-31
Attending: ORTHOPAEDIC SURGERY | Admitting: ORTHOPAEDIC SURGERY
Payer: COMMERCIAL

## 2024-03-31 DIAGNOSIS — D21.21 BENIGN NEOPLASM OF CONNECTIVE AND OTHER SOFT TISSUE OF RIGHT LOWER LIMB, INCLUDING HIP: ICD-10-CM

## 2024-03-31 PROCEDURE — 73720 MRI LWR EXTREMITY W/O&W/DYE: CPT | Mod: 26 | Performed by: RADIOLOGY

## 2024-03-31 PROCEDURE — 255N000002 HC RX 255 OP 636: Performed by: ORTHOPAEDIC SURGERY

## 2024-03-31 PROCEDURE — 73720 MRI LWR EXTREMITY W/O&W/DYE: CPT | Mod: RT

## 2024-03-31 PROCEDURE — A9585 GADOBUTROL INJECTION: HCPCS | Performed by: ORTHOPAEDIC SURGERY

## 2024-03-31 RX ORDER — GADOBUTROL 604.72 MG/ML
9 INJECTION INTRAVENOUS ONCE
Status: COMPLETED | OUTPATIENT
Start: 2024-03-31 | End: 2024-03-31

## 2024-03-31 RX ADMIN — GADOBUTROL 9 ML: 604.72 INJECTION INTRAVENOUS at 09:50

## 2024-04-01 ENCOUNTER — OFFICE VISIT (OUTPATIENT)
Dept: ORTHOPEDICS | Facility: CLINIC | Age: 74
End: 2024-04-01
Payer: COMMERCIAL

## 2024-04-01 ENCOUNTER — PRE VISIT (OUTPATIENT)
Dept: ORTHOPEDICS | Facility: CLINIC | Age: 74
End: 2024-04-01

## 2024-04-01 VITALS — BODY MASS INDEX: 31.49 KG/M2 | WEIGHT: 189 LBS | HEIGHT: 65 IN

## 2024-04-01 DIAGNOSIS — M79.89 SOFT TISSUE MASS: Primary | ICD-10-CM

## 2024-04-01 PROCEDURE — 99213 OFFICE O/P EST LOW 20 MIN: CPT | Performed by: ORTHOPAEDIC SURGERY

## 2024-04-01 NOTE — LETTER
4/1/2024         RE: Nadira Perez  [address]  [place] [address]        Dear Colleague,    Thank you for referring your patient, Nadira Perez, to the [facility] [place]. Please see a copy of my visit note below.        [facility], Orthopaedic Oncology Surgery Consultation  by Rolan Cee M.D.    Nadira Perez MRN# [id number]    YOB: 1950     Requesting physician: Matilde Guerrero            Assessment and Plan:   Assessment:  Right leg subcutaneous mass, differential diagnosis includes venous anomaly, small muscle herniation or subcutaneous cyst.  Highly unlikely represent any type of neoplasm despite history of breast carcinoma.      Plan:  Advised nonsurgical management.  Patient notes that the AFO brace she wears sometimes is problematic.  I suggested that she consider working with her orthotist to adjust the position of her strap to avoid problems.  She may even widen the strap to completely cover the subcutaneous mass to the maintains compression on the area.    Return on a as needed basis.           History of Present Illness:   73 year old female  chief complaint    This 73-year-old woman is seen today for evaluation of a mass on the anterior aspect of her right leg.  She has a history of breast carcinoma in the past and presently is on numerous medications that have resulted in some lower extremity edema.  She also has a foot drop on the right side as a consequence of prior back surgery for which she wears an AFO.  Of note has been the development of subcutaneous mass along the anterior aspect of her leg that is causing problems and interference with her AFO fitting.      Current symptoms:  Problem: right leg mass  Onset and duration: over a year again  Awakens from sleep due to sx's:  No  Precipitating Injury:  No    Other joints or sites painful:  Yes, back   Fever: No  Appetite change or weight  loss: Yes, for weight due to water retention   History of prior or existing cancer: Yes, hx bilateral breast cancer     Background history:  DX:  History of breast carcinoma    TREATMENTS:  8/26/2004, Excisional biopsy on low back, ( Dr. Horowitz)   11/20/2007, Left modified radical mastectomy, right simple mastectomy, ( Dr. Mei) [facility]            Physical Exam:     EXAMINATION pertinent findings:   PSYCH: Pleasant, healthy-appearing, alert, oriented x3, cooperative. Normal mood and affect.  VITAL SIGNS: not currently breastfeeding..  Reviewed nursing intake notes.   There is no height or weight on file to calculate BMI.  RESP: non labored breathing   ABD: benign, soft, non-tender, no acute peritoneal findings  SKIN: grossly normal   LYMPHATIC: grossly normal, no adenopathy, no extremity edema  NEURO: grossly normal , no motor deficits  VASCULAR: satisfactory perfusion of all extremities   MUSCULOSKELETAL:   Palpable subcutaneous mass on the anterior medial aspect of the right proximal tibial crest.  Compresses and dissipates with compression over the entire area for several seconds.  Nonpulsatile.  Nonfluctuant.  No erythema and slight tenderness.           Data:   All laboratory data reviewed  All imaging studies reviewed by me    MRI examinations reviewed.  No evidence of any solid neoplasm or concern for metastasis.      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    Corneal opacity    Hypothyroidism    Osteopenia, unspecified location    Aromatase inhibitor use    Chronic pain of right knee    DDD (degenerative disc disease), lumbar    Degenerative scoliosis in adult patient    Carpal tunnel syndrome of right wrist    Peripheral neuropathy    Spinal stenosis of lumbar region with neurogenic claudication    Spondylolisthesis of lumbar region    Brain lesion    Dermatochalasis of both upper eyelids    S/P spinal fusion    Chronic bilateral low back pain    PVD (posterior vitreous detachment), left eye    Vitreous opacities of left eye    Closed nondisplaced fracture of lateral malleolus of left fibula, initial encounter    Acute left ankle pain    Sacroiliitis (H24)    Lumbar radiculopathy    Lumbosacral radiculopathy    Diabetes mellitus, type 2 (H)    Abnormal electrocardiogram    Ejection fraction < 50%    Closed fracture of sternum, unspecified portion of sternum, initial encounter     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    Thyroid disease        Also see scanned health assessment forms.       Past Surgical History:     Past Surgical History:   Procedure Laterality Date    ABDOMEN SURGERY      ovarian cyst, mesh    ARTHRODESIS WRIST Right     ARTHRODESIS WRIST  02/14/2013    Procedure: ARTHRODESIS WRIST;  left wrist scaphoid excision, four bone fusion, iliac crest bone graft  ( Mac with block);  Surgeon: Av Mendez MD;  [facility]    BIOPSY      skin, vaginal     BLEPHAROPLASTY BILATERAL Bilateral 05/06/2022    Procedure: UPPER BLEPHAROPLASTY, BILATERAL;  Surgeon: Jemal Sanchez MD;  [place] [facility] [place]    CATARACT IOL, RT/LT Right 03/13/2018    CATARACT IOL, RT/LT Left 02/20/2018    COLONOSCOPY  12/24/2013    Procedure: COMBINED COLONOSCOPY, SINGLE BIOPSY/POLYPECTOMY BY BIOPSY;  COLONOSCOPY;  Surgeon: Dom Alvarez MD;  Location:  GI    COLONOSCOPY N/A 3/12/2024    Procedure: COLONOSCOPY, FLEXIBLE, WITH LESION REMOVAL USING SNARE;  Surgeon: Amina Espinoza MD;  Location: [facility]    COSMETIC BLEPHAROPLASTY LOWER LIDS BILATERAL Bilateral 05/06/2022    Procedure: BLEPHAROPLASTY, LOWER EYELID, BILATERAL, COSMETIC;  Surgeon: Jemal Sanchez MD;  Location: [facility] [place]    COSMETIC SURGERY      ESOPHAGOSCOPY, GASTROSCOPY, DUODENOSCOPY (EGD), COMBINED N/A 11/23/2016    Procedure: COMBINED ESOPHAGOSCOPY, GASTROSCOPY, DUODENOSCOPY (EGD);  Surgeon: Quinten Feliciano MD;  Location: [facility]    ESOPHAGOSCOPY, GASTROSCOPY, DUODENOSCOPY (EGD), COMBINED N/A 3/12/2024    Procedure: ESOPHAGOGASTRODUODENOSCOPY, WITH BIOPSY;  Surgeon: Amina Espinoza MD;  Location: [facility]    EXTERNAL EAR SURGERY      right    EYE SURGERY      radial keratomy    FUSION, SPINE, INTERBODY, OBLIQUE ANTERIOR AND LUMBAR, 2 LEVELS, POST APPROACH, USING OTS N/A 11/18/2021    Procedure: Part 1: Oblique Anterior Interbody Fusion at Lumbar 5 to sacral 1 with use of Bone Morphogenic Protein,;  Surgeon: Serge Ramirez MD;  [facility]    GI SURGERY      Umbilical hernia repair    GRAFT BONE FROM ILIAC CREST  02/14/2013    Procedure: GRAFT BONE FROM ILIAC CREST;  mac with block and local infilitration;  Surgeon: vA Mendez MD;  [facility]     BREATH HYDROGEN TEST N/A 10/14/2016    Procedure: HYDROGEN BREATH TEST;  Surgeon: Cheri Barron MD;  Location:  GI    HERNIA REPAIR      umbilical age 18 mos.    HYSTERECTOMY TOTAL ABDOMINAL  05/03/2000    INJECT EPIDURAL CAUDAL N/A  09/12/2023    Procedure: Caudal epidural steroid injection with fluoroscopy;  Surgeon: Natasha Umaña MD;  Location: [facility] OR    INJECT EPIDURAL CAUDAL N/A 1/2/2024    Procedure: INJECTION, EPIDURAL, CAUDAL;  Surgeon: Natasha Umaña MD;  Location: [facility] OR    INJECT EPIDURAL LUMBAR N/A 05/23/2023    Procedure: L3-4 interlaminar epidural steroid injection with fluoroscopy ( left> right);  Surgeon: Natasha Umaña MD;  Location: [facility] OR    INJECT JOINT SACROILIAC Left 04/27/2023    Procedure: Left sacroiliac joint steroid injection with fluoroscopy;  Surgeon: Natasha Umaña MD;  Location: [facility] OR    MASTECTOMY MODIFIED RADICAL Bilateral     bilateral; right breast prophylactic    OPTICAL TRACKING SYSTEM FUSION POSTERIOR SPINE LUMBAR N/A 11/18/2021    Procedure: Open Posterior Instrumented Spinal Fusion at Lumbar 5 to Sacral 1, with grimm aguayo osteotomy, use of O-Arm/Stealth;  Surgeon: Serge Ramirez MD;  [facility]    PARATHYROIDECTOMY  09/23/2004    R SUPERIOR    PARATHYROIDECTOMY  09/23/2004    parathyroid resection, subtotal    RELEASE CARPAL TUNNEL Right 12/02/2021    Procedure: RIGHT CARPAL TUNNEL RELEASE, RIGHT WRIST HARDWARE REMOVAL, RIGHT CARPAL BOSS EXCISION;  Surgeon: Rolan Conley MD;  Location: [facility] OR    REMOVE HARDWARE HAND  09/24/2013    Procedure: REMOVE HARDWARE HAND;  Left Hand Screw Removal       RHINOPLASTY  1968    thyr proc skin closed cosmetic manner by subcuticular stitch  01/23/2009    THYROPLASTY  10/09/2009    TONSILLECTOMY  1977    VITRECTOMY PARSPLANA WITH 25 GAUGE SYSTEM Left 06/20/2022    Procedure: LEFT EYE VITRECTOMY, PARS PLANA APPROACH, USING 25-GAUGE INSTRUMENTS, laser;  Surgeon: Felix Carlos MD;  Location: [facility] OR    WRIST SURGERY      wrist arthrodesis            Social History:     Social History     Socioeconomic History    Marital status:      Spouse name: Not on file    Number of children: Not on file    Years of education: Not on file     Highest education level: Not on file   Occupational History    Not on file   Tobacco Use    Smoking status: Never     Passive exposure: Never    Smokeless tobacco: Never   Vaping Use    Vaping Use: Never used   Substance and Sexual Activity    Alcohol use: Yes     Alcohol/week: 7.0 standard drinks of alcohol     Types: 7 Glasses of wine per week     Comment: Rare if ever    Drug use: Yes     Types: Marijuana     Comment: Smoke through water filter for chronic back pain PRN.    Sexual activity: Not Currently     Partners: Male     Birth control/protection: Post-menopausal, Female Surgical, None   Other Topics Concern     Service No    Blood Transfusions Not Asked    Caffeine Concern No    Occupational Exposure No    Hobby Hazards No    Sleep Concern No    Stress Concern No    Weight Concern No    Special Diet No    Back Care No    Exercise Yes     Comment: walks 4-6x  week for 20-30 min. each    Bike Helmet Not Asked    Seat Belt Yes    Self-Exams Yes    Parent/sibling w/ CABG, MI or angioplasty before 65F 55M? Yes     Comment: Father   Social History Narrative    .  Recently retired. 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        She lives with a renter.         She exercises 50 minutes three times a week.     Social Determinants of Health     Financial Resource Strain: Low Risk  (12/19/2023)    Financial Resource Strain     Within the past 12 months, have you or your family members you live with been unable to get utilities (heat, electricity) when it was really needed?: No   Food Insecurity: Low Risk  (12/19/2023)    Food Insecurity     Within the past 12 months, did you worry that your food would run out before you got money to buy more?: No     Within the past 12 months, did the food you bought just not last and you didn t have money to get more?: No   Transportation Needs: Low Risk  (12/19/2023)    Transportation Needs      Within the past 12 months, has lack of transportation kept you from medical appointments, getting your medicines, non-medical meetings or appointments, work, or from getting things that you need?: No   Physical Activity: Not on file   Stress: Not on file   Social Connections: Not on file   Interpersonal Safety: Low Risk  (11/30/2023)    Interpersonal Safety     Do you feel physically and emotionally safe where you currently live?: Yes     Within the past 12 months, have you been hit, slapped, kicked or otherwise physically hurt by someone?: No     Within the past 12 months, have you been humiliated or emotionally abused in other ways by your partner or ex-partner?: No   Housing Stability: Low Risk  (12/19/2023)    Housing Stability     Do you have housing? : Yes     Are you worried about losing your housing?: No            Family History:       Family History   Problem Relation Age of Onset    Neurologic Disorder Mother         Anuerysm of Cerebral Artery, Dementia    Diabetes Mother     Thyroid Disease Mother         Hyperthyroidism (goiter)    Cerebrovascular Disease Mother         Hemorrhagic    Dementia Mother     Osteoporosis Mother     Hyperlipidemia Mother     Heart Disease Father         AAA    Hypertension Father     Coronary Artery Disease Father     Hyperlipidemia Father     Mental Illness Father         Schizophrenia?    Dementia Brother         hydrocephalis    Circulatory Brother         Perihperal Neurophathy    Diabetes Maternal Grandmother         Presumed Type 2    Asthma Maternal Grandmother     Chronic Obstructive Pulmonary Disease Maternal Grandfather         father    Asthma Maternal Grandfather     Diabetes Maternal Aunt         x2    Melanoma Maternal Aunt     Glaucoma Maternal Aunt     Breast Cancer Cousin     Breast Cancer Cousin     Dementia Other     Cancer Other         malignant melanoma    Hypertension Other     Hypertension Other     Cerebrovascular Disease Other      Cerebrovascular Disease Other     Obesity Other     Respiratory Other     Cancer Other     Diabetes Other     Asthma Other     Other Cancer Other         Malignant melanoma    Obesity Other     Macular Degeneration No family hx of     Kidney Disease No family hx of     Thrombosis No family hx of     Arthritis No family hx of     Depression No family hx of     Substance Abuse No family hx of     Cystic Fibrosis No family hx of     Early Death No family hx of     Coronary Artery Disease Early Onset No family hx of     Heart Failure No family hx of     Bleeding Diathesis No family hx of     Ovarian Cancer No family hx of     Uterine Cancer No family hx of     Prostate Cancer No family hx of     Colorectal Cancer No family hx of     Pancreatic Cancer No family hx of     Lung Cancer No family hx of     Autoimmune Disease No family hx of     Unknown/Adopted No family hx of     Genetic Disorder No family hx of     Bleeding Disorder No family hx of     Clotting Disorder No family hx of     Anesthesia Reaction No family hx of             Medications:     Current Outpatient Medications   Medication Sig    acetaminophen (TYLENOL) 500 MG tablet Take 1,000 mg by mouth 3 times daily    amLODIPine (NORVASC) 5 MG tablet Take 1 tablet (5 mg) by mouth daily    Ascorbic Acid (VITAMIN C) 500 MG CHEW Take 1 tablet by mouth daily    atorvastatin (LIPITOR) 80 MG tablet Take 1 tablet (80 mg) by mouth daily    Cholecalciferol (VITAMIN D3) 50 MCG (2000 UT) CAPS TAKE 3 CAPSULES (6,000 UNITS) BY MOUTH DAILY. (Patient taking differently: Take 4,000 Units by mouth daily)    CRANBERRY EXTRACT PO Take 2 tablets by mouth daily    diclofenac (VOLTAREN) 1 % topical gel Apply 2 g topically 4 times daily    gabapentin (NEURONTIN) 300 MG capsule Take 4 capsules (1,200 mg) by mouth 3 times daily    HEMP OIL OR EXTRACT OR OTHER CBD CANNABINOID, NOT MEDICAL CANNABIS, THC    hydroCHLOROthiazide 12.5 MG tablet Take 1 tablet (12.5 mg) by mouth daily     levothyroxine (SYNTHROID/LEVOTHROID) 112 MCG tablet TAKE 1/2 TAB BY MOUTH EVERY DAY    lisinopril (ZESTRIL) 40 MG tablet Take 1 tablet (40 mg) by mouth daily    magnesium 250 MG tablet Take 400 mg by mouth every evening    Melatonin 10 MG TABS tablet Take 10 mg by mouth at bedtime    metoprolol succinate ER (TOPROL XL) 50 MG 24 hr tablet Take 1 tablet (50 mg) by mouth daily    Multiple Vitamin (MULTI-VITAMIN) per tablet Take 1 tablet by mouth daily    omeprazole (PRILOSEC) 20 MG DR capsule Take 1 capsule (20 mg) by mouth daily for 90 days    ondansetron (ZOFRAN-ODT) 4 MG ODT tab Take 1 tablet (4 mg) by mouth every 12 hours as needed for nausea    spironolactone (ALDACTONE) 25 MG tablet Take 25 mg by mouth 2 times daily    tiZANidine (ZANAFLEX) 4 MG tablet Take 1 tablet (4 mg) by mouth at bedtime     Current Facility-Administered Medications   Medication    lidocaine 1 % injection 1 mL    lidocaine 1 % injection 1 mL    romosozumab-aqqg (EVENITY) injection 210 mg    triamcinolone (KENALOG-40) injection 40 mg          Review of Systems:   A comprehensive 10 point review of systems (constitutional, ENT, cardiac, peripheral vascular, lymphatic, respiratory, GI, , Musculoskeletal, skin, Neurological) was performed and found to be negative except as described in this note.     See intake form completed by patient

## 2024-04-01 NOTE — NURSING NOTE
"Chief Complaint   Patient presents with    Consult     Right leg mass referred by Matilde Quiñonez NP. First noticed over a year ago. Had attempted to aspirate with PCP but unsuccessful.        73 year old  1950    Primary MD: Matilde Quiñonez  Ref. MD: Matilde Quiñonez    Ht 1.66 m (5' 5.35\")   Wt 85.7 kg (189 lb)   LMP  (LMP Unknown)   BMI 31.11 kg/m              Pain Assessment  Patient Currently in Pain: Bunnyies              [facility]  [facility] [address] [place] [address]  [address]        Allergies   Allergen Reactions    Erythromycin Nausea    Penicillins Hives     Around age 6 - doesn't recall full reaction, mother told her it was hives.     Has tolerated cephalsporins.            Current Outpatient Medications   Medication    acetaminophen (TYLENOL) 500 MG tablet    amLODIPine (NORVASC) 5 MG tablet    Ascorbic Acid (VITAMIN C) 500 MG CHEW    atorvastatin (LIPITOR) 80 MG tablet    Cholecalciferol (VITAMIN D3) 50 MCG (2000 UT) CAPS    CRANBERRY EXTRACT PO    diclofenac (VOLTAREN) 1 % topical gel    gabapentin (NEURONTIN) 300 MG capsule    HEMP OIL OR EXTRACT OR OTHER CBD CANNABINOID, NOT MEDICAL CANNABIS,    hydroCHLOROthiazide 12.5 MG tablet    levothyroxine (SYNTHROID/LEVOTHROID) 112 MCG tablet    lisinopril (ZESTRIL) 40 MG tablet    magnesium 250 MG tablet    Melatonin 10 MG TABS tablet    metoprolol succinate ER (TOPROL XL) 50 MG 24 hr tablet    Multiple Vitamin (MULTI-VITAMIN) per tablet    omeprazole (PRILOSEC) 20 MG DR capsule    ondansetron (ZOFRAN-ODT) 4 MG ODT tab    spironolactone (ALDACTONE) 25 MG tablet    tiZANidine (ZANAFLEX) 4 MG tablet     Current Facility-Administered Medications   Medication    lidocaine 1 % injection 1 mL    lidocaine 1 % injection 1 mL    romosozumab-aqqg (EVENITY) injection 210 mg    triamcinolone (KENALOG-40) injection 40 mg                     "

## 2024-04-02 ENCOUNTER — ANCILLARY PROCEDURE (OUTPATIENT)
Dept: CARDIOLOGY | Facility: CLINIC | Age: 74
End: 2024-04-02
Attending: INTERNAL MEDICINE
Payer: COMMERCIAL

## 2024-04-02 DIAGNOSIS — I51.81 STRESS-INDUCED CARDIOMYOPATHY: ICD-10-CM

## 2024-04-02 LAB — LVEF ECHO: NORMAL

## 2024-04-02 PROCEDURE — 99207 PR STATISTIC IV PUSH SINGLE INITIAL SUBSTANCE: CPT | Performed by: INTERNAL MEDICINE

## 2024-04-02 PROCEDURE — 93321 DOPPLER ECHO F-UP/LMTD STD: CPT | Performed by: INTERNAL MEDICINE

## 2024-04-02 PROCEDURE — 93308 TTE F-UP OR LMTD: CPT | Performed by: INTERNAL MEDICINE

## 2024-04-02 PROCEDURE — 93325 DOPPLER ECHO COLOR FLOW MAPG: CPT | Performed by: INTERNAL MEDICINE

## 2024-04-02 RX ADMIN — Medication 3 ML: at 10:19

## 2024-04-05 ENCOUNTER — THERAPY VISIT (OUTPATIENT)
Dept: PHYSICAL THERAPY | Facility: CLINIC | Age: 74
End: 2024-04-05
Payer: COMMERCIAL

## 2024-04-05 ENCOUNTER — LAB (OUTPATIENT)
Dept: LAB | Facility: CLINIC | Age: 74
End: 2024-04-05
Payer: COMMERCIAL

## 2024-04-05 DIAGNOSIS — R53.1 DECREASED STRENGTH: ICD-10-CM

## 2024-04-05 DIAGNOSIS — I89.0 LYMPHEDEMA OF LEFT UPPER EXTREMITY: Primary | ICD-10-CM

## 2024-04-05 DIAGNOSIS — I10 BENIGN ESSENTIAL HYPERTENSION: ICD-10-CM

## 2024-04-05 DIAGNOSIS — Z91.81 H/O FALL: ICD-10-CM

## 2024-04-05 DIAGNOSIS — R26.89 IMPAIRMENT OF BALANCE: ICD-10-CM

## 2024-04-05 DIAGNOSIS — L90.5 SCAR CONDITION AND FIBROSIS OF SKIN: ICD-10-CM

## 2024-04-05 DIAGNOSIS — I89.0 LYMPHEDEMA: ICD-10-CM

## 2024-04-05 DIAGNOSIS — I89.0 LYMPHEDEMA OF BOTH LOWER EXTREMITIES: ICD-10-CM

## 2024-04-05 DIAGNOSIS — R53.81 PHYSICAL DECONDITIONING: ICD-10-CM

## 2024-04-05 PROCEDURE — 80048 BASIC METABOLIC PNL TOTAL CA: CPT

## 2024-04-05 PROCEDURE — 97112 NEUROMUSCULAR REEDUCATION: CPT | Mod: GP | Performed by: PHYSICAL THERAPIST

## 2024-04-05 PROCEDURE — 36415 COLL VENOUS BLD VENIPUNCTURE: CPT

## 2024-04-05 PROCEDURE — 97140 MANUAL THERAPY 1/> REGIONS: CPT | Mod: GP | Performed by: PHYSICAL THERAPIST

## 2024-04-05 NOTE — PROGRESS NOTES
04/05/24 0500   Appointment Info   Signing clinician's name / credentials Jennifer Jauregui, SHEN, NATALEE   Total/Authorized Visits [employer] Advantage ; certification   Visits Used 3   Medical Diagnosis lymphedema   PT Tx Diagnosis lymphedema, fibrosis, pain, impaired balance, decreased strength   Precautions/Limitations osteoporosis; h/o multiple falls and fractures   Progress Note/Certification   Start of Care Date 12/05/23   Onset of illness/injury or Date of Surgery 11/02/23  (date of order Ashley Perry PA-C; Survivorship clinic)   Therapy Frequency 1x/week x 6 then 2x/month x 2 months   Predicted Duration 90 days   Certification date from 12/05/23   Certification date to 03/04/24   Progress Note Completed Date 12/05/23   PT Goal 1   Goal Identifier Home program   Goal Description Patient will be independent in home program to manage conditions of lymphedema and fibrosis, impaired balance.   Rationale to maximize safety and independence with performance of ADLs and functional tasks;to maximize safety and independence within the home;to maximize safety and independence within the community;to maximize safety and independence with transportation;to maximize safety and independence with self cares   Target Date 06/01/24   PT Goal 2   Goal Identifier UE Edema / Volume   Goal Description Patient will report decreased volume of L  UE  by 5% and demonstrate stable volumes to decrease risk of infection.   Rationale to maximize safety and independence with performance of ADLs and functional tasks;to maximize safety and independence within the home;to maximize safety and independence within the community;to maximize safety and independence with transportation;to maximize safety and independence with self cares   Target Date 02/03/24   PT Goal 3   Goal Identifier LLIS   Goal Description Patient will demonstrate an 5 point decrease in LLIS to demonstrate a decreased impact of lymphedema on function.   Rationale to maximize  safety and independence with self cares   Target Date 06/01/24   Subjective Report   Subjective Report to dept with lapse in care d/t fall with new sternal midly displace fracture needing to reschedule PT to today. and with new onset reduced EF HF due to trauma from sternal fracture noting imroving status with current EF = 50-55%   Objective Measure 1   Objective Measure UE edema   Details nonpitting puffy edema thorughotu L UE with minimal involvement of L hand   Objective Measure 2   Objective Measure LE edema  / circumferences   Details R>L 2+ distal pretibial pitting  / L LE SA= 25cm; calf = 42cm   Objective Measure 3   Objective Measure volume UE to 40cm   Details L = 2516ml (increased 5.5% from 12/5/2023) ; M=6712mr; L>R = 24% (3/30/2023 L>R = 9%)   Objective Measure 4   Objective Measure STS   Details 0; requires UE support   Objective Measure 5   Objective Measure SLS balance   Details B = 0   Objective Measure 6   Objective Measure LLIS   Neuromuscular Re-education   Neuromuscular re-ed of mvmt, balance, coord, kinesthetic sense, posture, proprioception minutes (42457) 15   Neuro Re-ed 1 falls prevention education   Skilled Intervention s/p fall 2/12/2024 when walking in am without AFO or walker. further instructed in falls prevention with emphasis on using walker at all times especially when not in AFO and increased awareness and exagerated steppage gait when not in AFO to accomodate for foot drop.and encourage resuming PT at [facility] to address balance, deconditioning and mobilty.   Patient Response/Progress verbalized understanding   Manual Therapy   Manual Therapy: Mobilization, MFR, MLD, friction massage minutes (88004) 40   Manual Therapy 1 - Details volume,  garments, GCB, pump, MLD   Skilled Intervention pt stating seen by fitter last week and ordered another size large sleeve as unable to fit properly into size medium. and was unable to perform with worsening L UE edema and elevated risk of  infection. also ordered knee high comrpession stockings.  pt unable to use Flexitouch and instructing pt to contact vendor to assist in use. volume assessed demonstrating increased volume L UE from 12/2023 due to inability to participate in lymphedema home program with recent sternal fracture and inaiblity to perfomr self GCB as well as increased challenge with donning comrpession sleeve and states attempted use of pump but unable to recall how to use.  educating pt in current options for therapy to address L UE/UQ lympedema with demonstraton of worsening status and pt wishing to pursue course of intensive CLT to assist in reducing edema and in optimzing home program.  providing further instruction in self GCB of L UE including Poly Soft stockinette, foam roll spiraled hand to axilla, 4cm short stretch hand to proximal forearm, 10cm x 5m short stretch spiraled base of hand to mid upper arm and another 10cm x 5m spiraled base of hand to axilla and instructing pt o trial wearing x 24 hours then remove and reapply most properly fitting comrpession sleeve and gauntlet during day and remove at night and to trial perofrming GCB L UE 1x/week x 24 hours until able to schedule intensive CLT sessions.   Patient Response/Progress pt benefitting from both vcs and tactile cues to assist in self GCB   Education   Learner/Method Patient   Plan   Home program UE / LE edema exs daily, positioning to decrease edema, GCB L UE x 24hrs 1x/week, garments  (L sleeve and gauntlet and compression knee highs)during day   Updates to plan of care goals remain valid due to lapse in care   Plan for next session ask tolerance to gCB, assess new garments (socks and sleeve); progress with instruction in self GCB and falls prevention   Comments   Comments placed order 1/30/2024 for compression sleeve and gauntle and compression knee highs up to 20-30mhg; purchased size large AW cotton casua; possibly in a 20-30mmhgl.   Total Session Time   Timed  Code Treatment Minutes 55   Total Treatment Time (sum of timed and untimed services) 55         [facility]                                                                                   OUTPATIENT PHYSICAL THERAPY    PLAN OF TREATMENT FOR OUTPATIENT REHABILITATION   Patient's Last Name, First Name, Nadira Meyers YOB: 1950   Provider's Name   [facility]   Medical Record No.  [id number]     Onset Date: 11/02/23 (date of order Ashley Perry PA-C; Survivorship clinic)  Start of Care Date: 12/05/23     Medical Diagnosis:  lymphedema      PT Treatment Diagnosis:  lymphedema, fibrosis, pain, impaired balance, decreased strength Plan of Treatment  Frequency/Duration: 4x/week x 2 weeks then 1x/week x 4 weeks then 2x/month  Certification date from 3/4/2024 to 6/1/2024        See note for plan of treatment details and functional goals     Jennifer Jauregui, PT                         I CERTIFY THE NEED FOR THESE SERVICES FURNISHED UNDER        THIS PLAN OF TREATMENT AND WHILE UNDER MY CARE     (Physician attestation of this document indicates review and certification of the therapy plan).              Referring Provider:  Ashley Perry    Initial Assessment  See Epic Evaluation- Start of Care Date: 12/05/23            PLAN  Updating POC to address worsening L UE lymphedema as well as recent fall and decreased balance and deconditioning.      Beginning/End Dates of Progress Note Reporting Period:  3/4/2024 to 4/4/2024; delay in recertification due to lapse in care following fall with injury    Referring Provider:  Ashley Perry

## 2024-04-05 NOTE — PROGRESS NOTES
04/05/24 0500   Appointment Info   Signing clinician's name / credentials Jennifer Jauregui, SHEN, NATALEE   Total/Authorized Visits [employer] Medicare Advantage ; certification   Visits Used 3   Medical Diagnosis lymphedema   PT Tx Diagnosis lymphedema, fibrosis, pain, impaired balance, decreased strength   Precautions/Limitations osteoporosis; h/o multiple falls and fractures   Progress Note/Certification   Start of Care Date 12/05/23   Onset of illness/injury or Date of Surgery 11/02/23  (date of order Ashley Perry PA-C; Survivorship clinic)   Therapy Frequency 4x/week x 2 weeks with option for 1x/week until intensive session starts then decrease to 1x/week x 4 week then 2x/month   Predicted Duration 90 days   Certification date from 03/04/24   Certification date to 06/01/24   Progress Note Completed Date 04/05/24   PT Goal 1   Goal Identifier Home program   Goal Description Patient will be independent in home program to manage conditions of lymphedema and fibrosis, impaired balance.   Rationale to maximize safety and independence with performance of ADLs and functional tasks;to maximize safety and independence within the home;to maximize safety and independence within the community;to maximize safety and independence with transportation;to maximize safety and independence with self cares   Target Date 06/01/24   PT Goal 2   Goal Identifier UE Edema / Volume   Goal Description Patient will report decreased volume of L  UE  by 5% and demonstrate stable volumes to decrease risk of infection.   Rationale to maximize safety and independence with performance of ADLs and functional tasks;to maximize safety and independence within the home;to maximize safety and independence within the community;to maximize safety and independence with transportation;to maximize safety and independence with self cares   Target Date 05/01/24   PT Goal 3   Goal Identifier LLIS   Goal Description Patient will demonstrate an 5 point decrease in LLIS  to demonstrate a decreased impact of lymphedema on function.   Rationale to maximize safety and independence with self cares   Target Date 06/01/24   Subjective Report   Subjective Report to dept with lapse in care d/t fall with new sternal midly displace fracture needing to reschedule PT to today. and with new onset reduced EF HF due to trauma from sternal fracture noting imroving status with current EF = 50-55%   Objective Measure 1   Objective Measure UE edema   Details nonpitting puffy edema thorughotu L UE with minimal involvement of L hand   Objective Measure 2   Objective Measure LE edema  / circumferences   Details R>L 2+ distal pretibial pitting  / L LE SA= 25cm; calf = 42cm   Objective Measure 3   Objective Measure volume UE to 40cm   Details L = 2516ml (increased 5.5% from 12/5/2023) ; F=1230mo; L>R = 24% (3/30/2023 L>R = 9%)   Objective Measure 4   Objective Measure STS   Details 0; requires UE support   Objective Measure 5   Objective Measure SLS balance   Details B = 0   Objective Measure 6   Objective Measure LLIS   Neuromuscular Re-education   Neuromuscular re-ed of mvmt, balance, coord, kinesthetic sense, posture, proprioception minutes (50521) 15   Neuro Re-ed 1 falls prevention education   Skilled Intervention s/p fall 2/12/2024 when walking in am without AFO or walker. further instructed in falls prevention with emphasis on using walker at all times especially when not in AFO and increased awareness and exagerated steppage gait when not in AFO to accomodate for foot drop.and encourage resuming PT at [facility] to address balance, deconditioning and mobilty.   Patient Response/Progress verbalized understanding   Manual Therapy   Manual Therapy: Mobilization, MFR, MLD, friction massage minutes (70513) 40   Manual Therapy 1 - Details volume,  garments, GCB, pump, MLD   Skilled Intervention pt stating seen by fitter last week and ordered another size large sleeve as unable to fit properly into size  medium. and was unable to perform with worsening L UE edema and elevated risk of infection. also ordered knee high comrpession stockings.  pt unable to use Flexitouch and instructing pt to contact vendor to assist in use. volume assessed demonstrating increased volume L UE from 12/2023 due to inability to participate in lymphedema home program with recent sternal fracture and inaiblity to perfomr self GCB as well as increased challenge with donning comrpession sleeve and states attempted use of pump but unable to recall how to use.  educating pt in current options for therapy to address L UE/UQ lympedema with demonstraton of worsening status and pt wishing to pursue course of intensive CLT to assist in reducing edema and in optimzing home program.  providing further instruction in self GCB of L UE including Poly Soft stockinette, foam roll spiraled hand to axilla, 4cm short stretch hand to proximal forearm, 10cm x 5m short stretch spiraled base of hand to mid upper arm and another 10cm x 5m spiraled base of hand to axilla and instructing pt o trial wearing x 24 hours then remove and reapply most properly fitting comrpession sleeve and gauntlet during day and remove at night and to trial perofrming GCB L UE 1x/week x 24 hours until able to schedule intensive CLT sessions.   Patient Response/Progress pt benefitting from both vcs and tactile cues to assist in self GCB   Education   Learner/Method Patient   Plan   Home program UE / LE edema exs daily, positioning to decrease edema, GCB L UE x 24hrs 1x/week, garments  (L sleeve and gauntlet and compression knee highs)during day   Updates to plan of care goals remain valid due to lapse in care   Plan for next session ask tolerance to gCB, assess new garments (socks and sleeve); progress with instruction in self GCB and falls prevention; LLIS; 10m walk   Comments   Comments placed order 1/30/2024 for compression sleeve and gauntle and compression knee highs up to 20-30mhg;  purchased size large AW cotton casua; possibly in a 20-30mmhgl.   Total Session Time   Timed Code Treatment Minutes 55   Total Treatment Time (sum of timed and untimed services) 55         [facility]                                                                                   OUTPATIENT PHYSICAL THERAPY    PLAN OF TREATMENT FOR OUTPATIENT REHABILITATION   Patient's Last Name, First Name, Nadira Meyers YOB: 1950   Provider's Name   [facility]   Medical Record No.  [id number]     Onset Date: 11/02/23 (date of order Ashley Perry PA-C; Survivorship clinic)  Start of Care Date: 12/05/23     Medical Diagnosis:  lymphedema      PT Treatment Diagnosis:  lymphedema, fibrosis, pain, impaired balance, decreased strength Plan of Treatment  Frequency/Duration: 4x/week x 2 weeks with option for 1x/week until intensive session starts then decrease to 1x/week x 4 week then 2x/month/ 90 days    Certification date from 03/04/24 to 06/01/24         See note for plan of treatment details and functional goals     Jennifer Jauregui, PT                         I CERTIFY THE NEED FOR THESE SERVICES FURNISHED UNDER        THIS PLAN OF TREATMENT AND WHILE UNDER MY CARE     (Physician attestation of this document indicates review and certification of the therapy plan).              Referring Provider:  Ashley Perry    Initial Assessment  See Epic Evaluation- Start of Care Date: 12/05/23            PLAN  Delay in recert due to lapse in care with recent fall and injury. Updated POC    Beginning/End Dates of Progress Note Reporting Period:  12/5/2023-3/4/2024    Referring Provider:  Ashley Perry

## 2024-04-06 LAB
ANION GAP SERPL CALCULATED.3IONS-SCNC: 14 MMOL/L (ref 7–15)
BUN SERPL-MCNC: 33.4 MG/DL (ref 8–23)
CALCIUM SERPL-MCNC: 10 MG/DL (ref 8.8–10.2)
CHLORIDE SERPL-SCNC: 103 MMOL/L (ref 98–107)
CREAT SERPL-MCNC: 0.9 MG/DL (ref 0.51–0.95)
DEPRECATED HCO3 PLAS-SCNC: 24 MMOL/L (ref 22–29)
EGFRCR SERPLBLD CKD-EPI 2021: 68 ML/MIN/1.73M2
GLUCOSE SERPL-MCNC: 136 MG/DL (ref 70–99)
POTASSIUM SERPL-SCNC: 4 MMOL/L (ref 3.4–5.3)
SODIUM SERPL-SCNC: 141 MMOL/L (ref 135–145)

## 2024-04-11 ENCOUNTER — HOSPITAL ENCOUNTER (OUTPATIENT)
Dept: CARDIOLOGY | Facility: CLINIC | Age: 74
Discharge: HOME | End: 2024-04-11
Attending: INTERNAL MEDICINE | Admitting: INTERNAL MEDICINE
Payer: COMMERCIAL

## 2024-04-11 DIAGNOSIS — I49.3 FREQUENT PVCS: ICD-10-CM

## 2024-04-11 PROCEDURE — A9585 GADOBUTROL INJECTION: HCPCS | Performed by: INTERNAL MEDICINE

## 2024-04-11 PROCEDURE — 75561 CARDIAC MRI FOR MORPH W/DYE: CPT | Mod: 26 | Performed by: INTERNAL MEDICINE

## 2024-04-11 PROCEDURE — 75561 CARDIAC MRI FOR MORPH W/DYE: CPT

## 2024-04-11 PROCEDURE — 255N000002 HC RX 255 OP 636: Performed by: INTERNAL MEDICINE

## 2024-04-11 RX ORDER — DIPHENHYDRAMINE HCL 25 MG
25 CAPSULE ORAL
Status: DISCONTINUED | OUTPATIENT
Start: 2024-04-11 | End: 2024-04-12 | Stop reason: HOSPADM

## 2024-04-11 RX ORDER — ACYCLOVIR 200 MG/1
0-1 CAPSULE ORAL
Status: DISCONTINUED | OUTPATIENT
Start: 2024-04-11 | End: 2024-04-12 | Stop reason: HOSPADM

## 2024-04-11 RX ORDER — GADOBUTROL 604.72 MG/ML
11 INJECTION INTRAVENOUS ONCE
Status: COMPLETED | OUTPATIENT
Start: 2024-04-11 | End: 2024-04-11

## 2024-04-11 RX ORDER — DIAZEPAM 5 MG
5 TABLET ORAL EVERY 30 MIN PRN
Status: DISCONTINUED | OUTPATIENT
Start: 2024-04-11 | End: 2024-04-12 | Stop reason: HOSPADM

## 2024-04-11 RX ORDER — ONDANSETRON 2 MG/ML
4 INJECTION INTRAMUSCULAR; INTRAVENOUS
Status: DISCONTINUED | OUTPATIENT
Start: 2024-04-11 | End: 2024-04-12 | Stop reason: HOSPADM

## 2024-04-11 RX ORDER — METHYLPREDNISOLONE SODIUM SUCCINATE 125 MG/2ML
125 INJECTION, POWDER, LYOPHILIZED, FOR SOLUTION INTRAMUSCULAR; INTRAVENOUS
Status: DISCONTINUED | OUTPATIENT
Start: 2024-04-11 | End: 2024-04-12 | Stop reason: HOSPADM

## 2024-04-11 RX ORDER — DIPHENHYDRAMINE HYDROCHLORIDE 50 MG/ML
25-50 INJECTION INTRAMUSCULAR; INTRAVENOUS
Status: DISCONTINUED | OUTPATIENT
Start: 2024-04-11 | End: 2024-04-12 | Stop reason: HOSPADM

## 2024-04-11 RX ADMIN — GADOBUTROL 11 ML: 604.72 INJECTION INTRAVENOUS at 13:15

## 2024-04-12 ENCOUNTER — MYC MEDICAL ADVICE (OUTPATIENT)
Dept: CARDIOLOGY | Facility: CLINIC | Age: 74
End: 2024-04-12
Payer: COMMERCIAL

## 2024-04-16 ENCOUNTER — MYC MEDICAL ADVICE (OUTPATIENT)
Dept: INTERNAL MEDICINE | Facility: CLINIC | Age: 74
End: 2024-04-16
Payer: COMMERCIAL

## 2024-04-16 DIAGNOSIS — Z85.3 HX: BREAST CANCER: Primary | ICD-10-CM

## 2024-04-16 DIAGNOSIS — I10 BENIGN ESSENTIAL HYPERTENSION: ICD-10-CM

## 2024-04-16 RX ORDER — HYDROCHLOROTHIAZIDE 25 MG/1
25 TABLET ORAL DAILY
Qty: 30 TABLET | Refills: 3 | Status: SHIPPED | OUTPATIENT
Start: 2024-04-16 | End: 2024-07-09

## 2024-04-23 ENCOUNTER — THERAPY VISIT (OUTPATIENT)
Dept: PHYSICAL THERAPY | Facility: CLINIC | Age: 74
End: 2024-04-23
Payer: COMMERCIAL

## 2024-04-23 DIAGNOSIS — R53.81 PHYSICAL DECONDITIONING: ICD-10-CM

## 2024-04-23 DIAGNOSIS — I89.0 LYMPHEDEMA: ICD-10-CM

## 2024-04-23 DIAGNOSIS — R53.1 DECREASED STRENGTH: ICD-10-CM

## 2024-04-23 DIAGNOSIS — R26.89 IMPAIRMENT OF BALANCE: ICD-10-CM

## 2024-04-23 DIAGNOSIS — I89.0 LYMPHEDEMA OF LEFT UPPER EXTREMITY: Primary | ICD-10-CM

## 2024-04-23 DIAGNOSIS — L90.5 SCAR CONDITION AND FIBROSIS OF SKIN: ICD-10-CM

## 2024-04-23 DIAGNOSIS — Z91.81 H/O FALL: ICD-10-CM

## 2024-04-23 DIAGNOSIS — I89.0 LYMPHEDEMA OF BOTH LOWER EXTREMITIES: ICD-10-CM

## 2024-04-23 PROCEDURE — 97110 THERAPEUTIC EXERCISES: CPT | Mod: GP | Performed by: PHYSICAL THERAPIST

## 2024-04-23 PROCEDURE — 97140 MANUAL THERAPY 1/> REGIONS: CPT | Mod: GP | Performed by: PHYSICAL THERAPIST

## 2024-04-23 PROCEDURE — 97112 NEUROMUSCULAR REEDUCATION: CPT | Mod: GP | Performed by: PHYSICAL THERAPIST

## 2024-04-23 NOTE — TELEPHONE ENCOUNTER
Patient saw [place] message. No response or questions at this time.    Sandy Calvillo, RN, BSN  Cardiology RN Care Coordinator   [place]   Phone: [phone]  Fax: [phone] ([place]) [phone] ([place])

## 2024-04-25 ENCOUNTER — THERAPY VISIT (OUTPATIENT)
Dept: PHYSICAL THERAPY | Facility: CLINIC | Age: 74
End: 2024-04-25
Payer: COMMERCIAL

## 2024-04-25 DIAGNOSIS — I89.0 LYMPHEDEMA OF LEFT UPPER EXTREMITY: Primary | ICD-10-CM

## 2024-04-25 DIAGNOSIS — R26.89 IMPAIRMENT OF BALANCE: ICD-10-CM

## 2024-04-25 DIAGNOSIS — I89.0 LYMPHEDEMA: ICD-10-CM

## 2024-04-25 DIAGNOSIS — R53.1 DECREASED STRENGTH: ICD-10-CM

## 2024-04-25 DIAGNOSIS — R53.81 PHYSICAL DECONDITIONING: ICD-10-CM

## 2024-04-25 DIAGNOSIS — Z91.81 H/O FALL: ICD-10-CM

## 2024-04-25 DIAGNOSIS — I89.0 LYMPHEDEMA OF BOTH LOWER EXTREMITIES: ICD-10-CM

## 2024-04-25 DIAGNOSIS — L90.5 SCAR CONDITION AND FIBROSIS OF SKIN: ICD-10-CM

## 2024-04-25 PROCEDURE — 97112 NEUROMUSCULAR REEDUCATION: CPT | Mod: GP | Performed by: PHYSICAL THERAPIST

## 2024-04-25 PROCEDURE — 97140 MANUAL THERAPY 1/> REGIONS: CPT | Mod: GP | Performed by: PHYSICAL THERAPIST

## 2024-04-29 ENCOUNTER — MYC MEDICAL ADVICE (OUTPATIENT)
Dept: PHARMACY | Facility: CLINIC | Age: 74
End: 2024-04-29

## 2024-04-29 ENCOUNTER — THERAPY VISIT (OUTPATIENT)
Dept: PHYSICAL THERAPY | Facility: CLINIC | Age: 74
End: 2024-04-29
Payer: COMMERCIAL

## 2024-04-29 DIAGNOSIS — I89.0 LYMPHEDEMA: ICD-10-CM

## 2024-04-29 DIAGNOSIS — I89.0 LYMPHEDEMA OF LEFT UPPER EXTREMITY: Primary | ICD-10-CM

## 2024-04-29 DIAGNOSIS — R53.1 DECREASED STRENGTH: ICD-10-CM

## 2024-04-29 DIAGNOSIS — R26.89 IMPAIRMENT OF BALANCE: ICD-10-CM

## 2024-04-29 DIAGNOSIS — I10 BENIGN ESSENTIAL HYPERTENSION: Primary | ICD-10-CM

## 2024-04-29 DIAGNOSIS — I89.0 LYMPHEDEMA OF BOTH LOWER EXTREMITIES: ICD-10-CM

## 2024-04-29 DIAGNOSIS — L90.5 SCAR CONDITION AND FIBROSIS OF SKIN: ICD-10-CM

## 2024-04-29 DIAGNOSIS — Z91.81 H/O FALL: ICD-10-CM

## 2024-04-29 DIAGNOSIS — R53.81 PHYSICAL DECONDITIONING: ICD-10-CM

## 2024-04-29 PROCEDURE — 97140 MANUAL THERAPY 1/> REGIONS: CPT | Mod: GP | Performed by: PHYSICAL THERAPIST

## 2024-04-29 PROCEDURE — 97112 NEUROMUSCULAR REEDUCATION: CPT | Mod: GP | Performed by: PHYSICAL THERAPIST

## 2024-05-01 ENCOUNTER — THERAPY VISIT (OUTPATIENT)
Dept: PHYSICAL THERAPY | Facility: CLINIC | Age: 74
End: 2024-05-01
Payer: COMMERCIAL

## 2024-05-01 DIAGNOSIS — R53.81 PHYSICAL DECONDITIONING: ICD-10-CM

## 2024-05-01 DIAGNOSIS — I89.0 LYMPHEDEMA OF LEFT UPPER EXTREMITY: Primary | ICD-10-CM

## 2024-05-01 DIAGNOSIS — Z91.81 H/O FALL: ICD-10-CM

## 2024-05-01 DIAGNOSIS — L90.5 SCAR CONDITION AND FIBROSIS OF SKIN: ICD-10-CM

## 2024-05-01 DIAGNOSIS — I89.0 LYMPHEDEMA OF BOTH LOWER EXTREMITIES: ICD-10-CM

## 2024-05-01 DIAGNOSIS — R26.89 IMPAIRMENT OF BALANCE: ICD-10-CM

## 2024-05-01 DIAGNOSIS — R53.1 DECREASED STRENGTH: ICD-10-CM

## 2024-05-01 PROCEDURE — 97112 NEUROMUSCULAR REEDUCATION: CPT | Mod: GP | Performed by: PHYSICAL THERAPIST

## 2024-05-01 PROCEDURE — 97110 THERAPEUTIC EXERCISES: CPT | Mod: GP | Performed by: PHYSICAL THERAPIST

## 2024-05-01 PROCEDURE — 97140 MANUAL THERAPY 1/> REGIONS: CPT | Mod: GP | Performed by: PHYSICAL THERAPIST

## 2024-05-03 ENCOUNTER — LAB (OUTPATIENT)
Dept: LAB | Facility: CLINIC | Age: 74
End: 2024-05-03
Payer: COMMERCIAL

## 2024-05-03 ENCOUNTER — THERAPY VISIT (OUTPATIENT)
Dept: PHYSICAL THERAPY | Facility: CLINIC | Age: 74
End: 2024-05-03
Payer: COMMERCIAL

## 2024-05-03 DIAGNOSIS — I89.0 LYMPHEDEMA: ICD-10-CM

## 2024-05-03 DIAGNOSIS — R53.81 PHYSICAL DECONDITIONING: ICD-10-CM

## 2024-05-03 DIAGNOSIS — R26.89 IMPAIRMENT OF BALANCE: ICD-10-CM

## 2024-05-03 DIAGNOSIS — I89.0 LYMPHEDEMA OF LEFT UPPER EXTREMITY: Primary | ICD-10-CM

## 2024-05-03 DIAGNOSIS — I10 BENIGN ESSENTIAL HYPERTENSION: ICD-10-CM

## 2024-05-03 DIAGNOSIS — R53.1 DECREASED STRENGTH: ICD-10-CM

## 2024-05-03 DIAGNOSIS — L90.5 SCAR CONDITION AND FIBROSIS OF SKIN: ICD-10-CM

## 2024-05-03 DIAGNOSIS — I89.0 LYMPHEDEMA OF BOTH LOWER EXTREMITIES: ICD-10-CM

## 2024-05-03 DIAGNOSIS — Z91.81 H/O FALL: ICD-10-CM

## 2024-05-03 PROCEDURE — 36415 COLL VENOUS BLD VENIPUNCTURE: CPT

## 2024-05-03 PROCEDURE — 97140 MANUAL THERAPY 1/> REGIONS: CPT | Mod: GP | Performed by: PHYSICAL THERAPIST

## 2024-05-03 PROCEDURE — 80048 BASIC METABOLIC PNL TOTAL CA: CPT

## 2024-05-04 LAB
ANION GAP SERPL CALCULATED.3IONS-SCNC: 12 MMOL/L (ref 7–15)
BUN SERPL-MCNC: 24.1 MG/DL (ref 8–23)
CALCIUM SERPL-MCNC: 9.8 MG/DL (ref 8.8–10.2)
CHLORIDE SERPL-SCNC: 101 MMOL/L (ref 98–107)
CREAT SERPL-MCNC: 0.89 MG/DL (ref 0.51–0.95)
DEPRECATED HCO3 PLAS-SCNC: 26 MMOL/L (ref 22–29)
EGFRCR SERPLBLD CKD-EPI 2021: 69 ML/MIN/1.73M2
GLUCOSE SERPL-MCNC: 98 MG/DL (ref 70–99)
POTASSIUM SERPL-SCNC: 3.8 MMOL/L (ref 3.4–5.3)
SODIUM SERPL-SCNC: 139 MMOL/L (ref 135–145)

## 2024-05-07 ASSESSMENT — PAIN SCALES - PAIN ENJOYMENT GENERAL ACTIVITY SCALE (PEG)
AVG_PAIN_PASTWEEK: 4
PEG_TOTALSCORE: 3.33
INTERFERED_ENJOYMENT_LIFE: 3
INTERFERED_GENERAL_ACTIVITY: 3

## 2024-05-08 ENCOUNTER — THERAPY VISIT (OUTPATIENT)
Dept: PHYSICAL THERAPY | Facility: CLINIC | Age: 74
End: 2024-05-08
Payer: COMMERCIAL

## 2024-05-08 DIAGNOSIS — I89.0 LYMPHEDEMA OF LEFT UPPER EXTREMITY: Primary | ICD-10-CM

## 2024-05-08 DIAGNOSIS — R26.89 IMPAIRMENT OF BALANCE: ICD-10-CM

## 2024-05-08 DIAGNOSIS — I89.0 LYMPHEDEMA OF BOTH LOWER EXTREMITIES: ICD-10-CM

## 2024-05-08 DIAGNOSIS — Z91.81 H/O FALL: ICD-10-CM

## 2024-05-08 DIAGNOSIS — R53.81 PHYSICAL DECONDITIONING: ICD-10-CM

## 2024-05-08 DIAGNOSIS — L90.5 SCAR CONDITION AND FIBROSIS OF SKIN: ICD-10-CM

## 2024-05-08 DIAGNOSIS — R53.1 DECREASED STRENGTH: ICD-10-CM

## 2024-05-08 PROCEDURE — 97140 MANUAL THERAPY 1/> REGIONS: CPT | Mod: GP | Performed by: PHYSICAL THERAPIST

## 2024-05-08 PROCEDURE — 97110 THERAPEUTIC EXERCISES: CPT | Mod: GP | Performed by: PHYSICAL THERAPIST

## 2024-05-09 ENCOUNTER — TELEPHONE (OUTPATIENT)
Dept: ANESTHESIOLOGY | Facility: CLINIC | Age: 74
End: 2024-05-09
Payer: COMMERCIAL

## 2024-05-09 NOTE — TELEPHONE ENCOUNTER
Left message for patient regarding scheduling error (appointment should have been 60 minutes instead of 30 minutes per protocol). Alerted patient that her appointment would be abbreviated and offered to reschedule if she wished. [place]    ___________________________    Called re: New Patient appointment now available at 10 AM. Called to offer it to Ismael.     For scheduling purposes:    Left Voicemail (1st Attempt) and Sent Mychart (1st Attempt) for the patient to call back and schedule the following:    Appointment type: New patient  Provider: Renée Harris  Return date: 5/9 @ 10 AM  Specialty phone number: [phone]    If you have any scheduling questions, please message aMry Jo Almazan via Teams or refer patient to Mary Jo directly at [phone].

## 2024-05-10 ENCOUNTER — THERAPY VISIT (OUTPATIENT)
Dept: PHYSICAL THERAPY | Facility: CLINIC | Age: 74
End: 2024-05-10
Payer: COMMERCIAL

## 2024-05-10 DIAGNOSIS — Z91.81 H/O FALL: ICD-10-CM

## 2024-05-10 DIAGNOSIS — L90.5 SCAR CONDITION AND FIBROSIS OF SKIN: ICD-10-CM

## 2024-05-10 DIAGNOSIS — R53.1 DECREASED STRENGTH: ICD-10-CM

## 2024-05-10 DIAGNOSIS — R53.81 PHYSICAL DECONDITIONING: ICD-10-CM

## 2024-05-10 DIAGNOSIS — R26.89 IMPAIRMENT OF BALANCE: ICD-10-CM

## 2024-05-10 DIAGNOSIS — I89.0 LYMPHEDEMA OF LEFT UPPER EXTREMITY: Primary | ICD-10-CM

## 2024-05-10 DIAGNOSIS — I89.0 LYMPHEDEMA OF BOTH LOWER EXTREMITIES: ICD-10-CM

## 2024-05-10 PROCEDURE — 97140 MANUAL THERAPY 1/> REGIONS: CPT | Mod: GP | Performed by: PHYSICAL THERAPIST

## 2024-05-10 PROCEDURE — 97112 NEUROMUSCULAR REEDUCATION: CPT | Mod: GP | Performed by: PHYSICAL THERAPIST

## 2024-05-10 PROCEDURE — 97110 THERAPEUTIC EXERCISES: CPT | Mod: GP | Performed by: PHYSICAL THERAPIST

## 2024-05-10 NOTE — PROGRESS NOTES
05/10/24 0500   Appointment Info   Signing clinician's name / credentials SHEN Davey CLT-LANA   Total/Authorized Visits [facility] Medicare Advantage ; certification   Visits Used 10   Medical Diagnosis lymphedema   PT Tx Diagnosis lymphedema, fibrosis, pain, impaired balance, decreased strength   Precautions/Limitations osteoporosis; h/o multiple falls and fractures   Other pertinent information Anemia; Arthritis; Bladder or bowel problems; Cancer; Dizziness; Fibromyalgia; Foot drop; Hearing problems; Heart problems; Hepatitis; High blood pressure; History of fractures; Menopause; Osteoarthritis; Osteoporosis; Overweight; Pain at night or rest; Progressive neurological deficits; Radiation treatment; Severe dizziness; Thyroid problems; Vision problems  pt stating exacerbation of L UE lyphema due to pain of wrists, falls with fracture and inability to transition to planned garments and bandaging 1x/week.   Progress Note/Certification   Start of Care Date 12/05/23   Onset of illness/injury or Date of Surgery 11/02/23  (date of order Ashley Perry PA-C; Survivorship clinic)   Therapy Frequency 2x/month x 3 months   Predicted Duration 90 days   Certification date from 05/10/24   Certification date to 08/07/24   Progress Note Completed Date 04/05/24   PT Goal 1   Goal Identifier Home program   Goal Description Patient will be independent in home program to manage conditions of lymphedema and fibrosis, impaired balance.   Rationale to maximize safety and independence with performance of ADLs and functional tasks;to maximize safety and independence within the home;to maximize safety and independence within the community;to maximize safety and independence with transportation;to maximize safety and independence with self cares   Target Date 06/01/24   PT Goal 2   Goal Identifier UE Edema / Volume   Goal Description Patient will report decreased volume of L  UE  by 5% and demonstrate stable volumes to decrease risk of  infection.   Rationale to maximize safety and independence with performance of ADLs and functional tasks;to maximize safety and independence within the home;to maximize safety and independence within the community;to maximize safety and independence with transportation;to maximize safety and independence with self cares   Goal Progress MET:  decreased 13.3% L UE   Target Date 05/10/24  (goal date extended as just initated intensive CLT)   Date Met 05/08/24   PT Goal 3   Goal Identifier LLIS   Goal Description Patient will demonstrate an 5 point decrease in LLIS to demonstrate a decreased impact of lymphedema on function.   Rationale to maximize safety and independence with self cares   Target Date 06/01/24   Subjective Report   Subjective Report to dept in compression sleeve   Objective Measure 1   Objective Measure UE edema   Details nonpitting puffy edema thorughotu L UE with minimal involvement of L hand; demonstrating rubbery edema along proximal ulna  and demonstrating visible increased edema of medial proximal ulna and distal humerus today   Objective Measure 2   Objective Measure LE edema  / circumferences   Details R>L 2+ distal pretibial pitting  / L LE SA= 25cm; calf = 42cm   Objective Measure 3   Objective Measure volume UE to 40cm   Objective Measure 4   Objective Measure STS   Details 0; requires UE support   Objective Measure 5   Objective Measure SLS balance   Details B = 0   Objective Measure 6   Objective Measure LLIS   Details consider next session   Therapeutic Procedure/Exercise   Therapeutic Procedures: strength, endurance, ROM, flexibility minutes (78406) 25   Ther Proc 1 strengthening, stretching, aerobic   Skilled Intervention providing continued instruction in and options for implementing above home program including emphasis on perofmrinmg aerobic ex most days and in strengthening of all major mm groups 2-3x/week and issued updated HEP   Patient Response/Progress verbalized understanding  "  Ther Proc 1 - Details standing HR and seated TR (without AFO),, elevated STS, GS stretch, split stance hip hikes, core(bridging, SLR, CS); standing tband rows (horizontal and straight) and option for isometric B shoulder extension,  walking to Nustep/ Pilates   Neuromuscular Re-education   Neuromuscular re-ed of mvmt, balance, coord, kinesthetic sense, posture, proprioception minutes (75419) 15   Neuro Re-ed 1 balance drills   Skilled Intervention instructing pt in and pt demonstrating 2 options for progressing balance including mtandem stance and split stance use of UE as needed x 30\" vcs for soft knees. option to integrate HTs if noting need for increased challenge.   Patient Response/Progress issued updated HEP   Neuro Re-ed 1 - Details split stance and mtandem without  HTs   Manual Therapy   Manual Therapy: Mobilization, MFR, MLD, friction massage minutes (14444) 15   Manual Therapy 1 - Details garments, GCB, MLD, compression pump,  home program   Skilled Intervention assessed L UE in compression sleeve demonstrating visible increased edema medial mid UE and pt acknowleding she forgot to don yesterday prior to leaving home noting increased edema refill.  educating pt in perofmring GCB of L UE Sunday an arriving to therapy Monday in GCB to assess edema status and volume.  also instructing pt to trial compression pump either tonight or Saturday evening.  further educated in home program of wearing comrpession sleeve daily and laundering after each use and in trialing compression pump considering several times a week or more if current sleeve limiting edema refill and in perfomring GCB L UE 1x/week x 1-2 days to decrease edema refill as well as in self MLD trunk and L UE or just R UQ and L LQ if using pump.  perfomring MLD trunk and L UE with emphasis on clearing of L axillo inguinal pathwya with improving status   Patient Response/Progress increaed edema possibly due to no compression yesterday.   Education "   Learner/Method Patient   Plan   Home program standing GS stretch, stanidng HR/ seated TR, STS x 5 x 2 sets, walking with 4ww; UE / LE edema exs daily, positioning to decrease edema, GCB L UE x 24hrs then remove and assess , skin care and reapply, compression knee highs daily   Updates to plan of care goal 2 met progressing with all other goals   Plan for next session plan to see MOnday to review current HEP then plan to follow  up in about 1 month to assess effectivness of home program; check cubital fossa blishter   Comments   Comments size large class 1 compression sleeve and gauntle and compression knee highs up to 20-30mhg; purchased size large AW cotton casua; possibly in a 20-30mmhgl.   Total Session Time   Timed Code Treatment Minutes 55   Total Treatment Time (sum of timed and untimed services) 55         [facility]                                                                                   OUTPATIENT PHYSICAL THERAPY    PLAN OF TREATMENT FOR OUTPATIENT REHABILITATION   Patient's Last Name, First Name, MARCELINOAUGUSTIN  ChrisNadira YOB: 1950   Provider's Name   [facility]   Medical Record No.  [id number]     Onset Date: 11/02/23 (date of order Ashley Perry PA-C; Survivorship clinic)  Start of Care Date: 12/05/23     Medical Diagnosis:  lymphedema      PT Treatment Diagnosis:  lymphedema, fibrosis, pain, impaired balance, decreased strength Plan of Treatment  Frequency/Duration: 2x/month x 3 months/ 90 days    Certification date from 05/10/24 to 08/07/24         See note for plan of treatment details and functional goals     Jennifer Jauregui, PT                         I CERTIFY THE NEED FOR THESE SERVICES FURNISHED UNDER        THIS PLAN OF TREATMENT AND WHILE UNDER MY CARE     (Physician attestation of this document indicates review and certification of the therapy plan).              Referring Provider:  Ashley Perry    Initial  Assessment  See Epic Evaluation- Start of Care Date: 12/05/23            PLAN  Continue therapy per current plan of care.    Beginning/End Dates of Progress Note Reporting Period:  04/05/24 to 05/10/2024    Referring Provider:  Ashley Perry

## 2024-05-13 ENCOUNTER — THERAPY VISIT (OUTPATIENT)
Dept: PHYSICAL THERAPY | Facility: CLINIC | Age: 74
End: 2024-05-13
Payer: COMMERCIAL

## 2024-05-13 DIAGNOSIS — Z91.81 H/O FALL: ICD-10-CM

## 2024-05-13 DIAGNOSIS — R53.81 PHYSICAL DECONDITIONING: ICD-10-CM

## 2024-05-13 DIAGNOSIS — R53.1 DECREASED STRENGTH: ICD-10-CM

## 2024-05-13 DIAGNOSIS — R26.89 IMPAIRMENT OF BALANCE: ICD-10-CM

## 2024-05-13 DIAGNOSIS — I89.0 LYMPHEDEMA OF LEFT UPPER EXTREMITY: Primary | ICD-10-CM

## 2024-05-13 DIAGNOSIS — L90.5 SCAR CONDITION AND FIBROSIS OF SKIN: ICD-10-CM

## 2024-05-13 DIAGNOSIS — I89.0 LYMPHEDEMA OF BOTH LOWER EXTREMITIES: ICD-10-CM

## 2024-05-13 PROCEDURE — 97110 THERAPEUTIC EXERCISES: CPT | Mod: GP | Performed by: PHYSICAL THERAPIST

## 2024-05-13 PROCEDURE — 97112 NEUROMUSCULAR REEDUCATION: CPT | Mod: GP | Performed by: PHYSICAL THERAPIST

## 2024-05-13 PROCEDURE — 97140 MANUAL THERAPY 1/> REGIONS: CPT | Mod: GP | Performed by: PHYSICAL THERAPIST

## 2024-05-13 NOTE — PATIENT INSTRUCTIONS
LYMPHEDEMA and EXERCISE HOME PROGRAM        1) Wear your compression garments daily (compression knee highs and L arm sleeve removing at night to limit edema refill. Laundering as directed.    2) Perform bandaging of your Left arm consider at least 1x/week option to leave on 1-2 days as long as feeling ok and looking ok to reduce edema accumulating with garments alone.    3) Occasionally assess your arm and legs for edema before and after use of garments/bandaging to assist in assessing effectiveness or need for modifications.    4)  Perform Edema / Stretching / Strengthening / Aerobic / Balance exercises as directed. Insufficient activity is a risk factor for lymphedema.      5)  Self Lymphatic drainage massage or use of compression pump for Left arm as able (2-4x/week).    6) Continue to follow good skin care practices and precautions.    7)  Compression garments on average have a 3-6 month life expectancy depending on type of garment (ready made vs custom) and their use and wear.  Replace your day and night compression garments based on signs of wear (such as excessive pilling, running, snags, or holes), loss of elasticity and compression, or improper fit (too tight or too loose).   A.  If your garment is still fitting well:  reorder the same type and size garment.  B.  If your garment is not fitting properly (because of change in arm / leg size): call your doctor to assure no further medical intervention indicated then consider a referral to see your edema therapist for a recheck.  C.  You may need to be re-measured for a new compression garment if you have had a significant change in your weight (greater than 15-20lbs).  D.  A new onset of other medical conditions or challenges in using current garments may indicate the need for a new compression garment.    8)  Replace your bandages every 12 months or sooner if you notice loss of elasticity or excessive wear and tear despite proper care and laundering.    9)   Contact your physician with any worsening of edema not controlled with current home program, signs/symptoms of infection (redness, pain, warmth, fever, increased swelling) or increased challenges with current recommended home program.    10)  You should consider contact your edema therapist with any questions/concerns regarding your edema/ home program. You may need to contact your doctor first to get a new order if treatment indicated.

## 2024-05-17 ENCOUNTER — OFFICE VISIT (OUTPATIENT)
Dept: PHARMACY | Facility: CLINIC | Age: 74
End: 2024-05-17
Payer: COMMERCIAL

## 2024-05-17 ENCOUNTER — DOCUMENTATION ONLY (OUTPATIENT)
Dept: AUDIOLOGY | Facility: CLINIC | Age: 74
End: 2024-05-17
Payer: COMMERCIAL

## 2024-05-17 VITALS
OXYGEN SATURATION: 95 % | WEIGHT: 190 LBS | SYSTOLIC BLOOD PRESSURE: 116 MMHG | BODY MASS INDEX: 31.28 KG/M2 | DIASTOLIC BLOOD PRESSURE: 76 MMHG | HEART RATE: 74 BPM

## 2024-05-17 DIAGNOSIS — G47.00 INSOMNIA, UNSPECIFIED TYPE: ICD-10-CM

## 2024-05-17 DIAGNOSIS — K21.9 GASTROESOPHAGEAL REFLUX DISEASE, UNSPECIFIED WHETHER ESOPHAGITIS PRESENT: ICD-10-CM

## 2024-05-17 DIAGNOSIS — H90.3 SENSORINEURAL HEARING LOSS, BILATERAL: Primary | ICD-10-CM

## 2024-05-17 DIAGNOSIS — I10 BENIGN ESSENTIAL HYPERTENSION: Primary | ICD-10-CM

## 2024-05-17 PROCEDURE — 99606 MTMS BY PHARM EST 15 MIN: CPT | Performed by: PHARMACIST

## 2024-05-17 PROCEDURE — 99607 MTMS BY PHARM ADDL 15 MIN: CPT | Performed by: PHARMACIST

## 2024-05-17 NOTE — PATIENT INSTRUCTIONS
"Recommendations from today's MTM visit:                                                    MTM (medication therapy management) is a service provided by a clinical pharmacist designed to help you get the most of out of your medicines.   Today we reviewed what your medicines are for, how to know if they are working, that your medicines are safe and how to make your medicine regimen as easy as possible.    Continue medications as prescribed.    Follow-up: Return in about 13 weeks (around 8/16/2024) for Follow up, with me.    It was great speaking with you today.  I value your experience and would be very thankful for your time in providing feedback in our clinic survey. In the next few days, you may receive an email or text message from [employer] with a link to a survey related to your  clinical pharmacist.\"     To schedule another MTM appointment, please call the clinic directly or you may call the MTM scheduling line at [phone] or toll-free at [phone].     My Clinical Pharmacist's contact information:                                                      Please feel free to contact me with any questions or concerns you have.      Willis So, Pharm. D., [facility]  Medication Therapy Management Pharmacist    "

## 2024-05-17 NOTE — PROGRESS NOTES
Medication Therapy Management (MTM) Encounter    ASSESSMENT:                            Medication Adherence/Access: No issues identified    Hypertension:   Patient is meeting blood pressure goal of < 130/80mmHg.    Insomnia:   Stable.     GERD:  Stable.     PLAN:                            Continue medications as prescribed.    Follow-up: Return in about 13 weeks (around 8/16/2024) for Follow up, with me, in person.    SUBJECTIVE/OBJECTIVE:                          Ismael Perez is a 73 year old female coming in for a follow-up visit.       Reason for visit: MTM follow-up.    Allergies/ADRs: Reviewed in chart  Past Medical History: Reviewed in chart  Tobacco: She reports that she has never smoked. She has never been exposed to tobacco smoke. She has never used smokeless tobacco.  Alcohol: rare    Medication Adherence/Access: no issues reported    Hypertension   Amlodipine 5 mg daily  Lisinopril 40 mg daily  Metoprolol succinate ER 50 mg daily   Spironolactone 25 mg daily  Hydrochlorothiazide 25 mg once daily - previously had had some PVCs on this previously due to low potassium but has not had that at all this time.   Patient reports the following medication side effects: edema  Patient self monitors blood pressure.      Date BP   5/17 125/89   5/16 148/92   5/15 139/96   5/14 141/91   5/13 127/83   5/12 117/79   5/11 111/79          BP Readings from Last 3 Encounters:   05/17/24 116/76   03/15/24 136/82   03/13/24 131/81       Pulse Readings from Last 3 Encounters:   05/17/24 74   03/15/24 84   03/13/24 88            Insomnia:   Melatonin 10 mg daily  Tizanidine 4 mg daily  Magnesium 400 mg in the evening    Reports sleep is going okay    GERD:   Omeprazole 20 mg once daily   Patient reports heartburn.  Reports it is helping and not enough some days but she feels satisfied with how it is being treated.       Today's Vitals: /76   Pulse 74   Wt 190 lb (86.2 kg)   LMP  (LMP Unknown)   SpO2 95%   BMI 31.28  kg/m    ----------------      I spent 30 minutes with this patient today. All changes were made via collaborative practice agreement with MERCEDES Kyle CNP. A copy of the visit note was provided to the patient's provider(s).    A summary of these recommendations was given to the patient.    Willis So, Pharm. D., BCACP  Medication Therapy Management Pharmacist           Medication Therapy Recommendations  No medication therapy recommendations to display

## 2024-05-22 ENCOUNTER — OFFICE VISIT (OUTPATIENT)
Dept: PLASTIC SURGERY | Facility: CLINIC | Age: 74
End: 2024-05-22
Attending: NURSE PRACTITIONER
Payer: COMMERCIAL

## 2024-05-22 ENCOUNTER — PRE VISIT (OUTPATIENT)
Dept: PLASTIC SURGERY | Facility: CLINIC | Age: 74
End: 2024-05-22

## 2024-05-22 VITALS
OXYGEN SATURATION: 96 % | WEIGHT: 187.4 LBS | SYSTOLIC BLOOD PRESSURE: 115 MMHG | BODY MASS INDEX: 31.22 KG/M2 | HEART RATE: 79 BPM | HEIGHT: 65 IN | DIASTOLIC BLOOD PRESSURE: 77 MMHG

## 2024-05-22 DIAGNOSIS — Q17.9 CONGENITAL ABNORMALITY OF EAR: ICD-10-CM

## 2024-05-22 PROCEDURE — 99204 OFFICE O/P NEW MOD 45 MIN: CPT | Performed by: STUDENT IN AN ORGANIZED HEALTH CARE EDUCATION/TRAINING PROGRAM

## 2024-05-22 ASSESSMENT — PAIN SCALES - GENERAL: PAINLEVEL: NO PAIN (0)

## 2024-05-22 NOTE — LETTER
"5/22/2024       RE: Nadira Perez  [address]  [facility] [facility] [address]       Dear Colleague,    Thank you for referring your patient, Nadira Perez, to the [facility] [place] [facility]. Please see a copy of my visit note below.    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  Patient had multiple surgeries on the right ear, including at the age of 18 and 32 years.  As she has gotten older, she is needed to wear hearing aids and glasses.  Over several years, patient has had an issue with placement of her hearing aid in her glasses due to the residual deformity on the right ear.  She seeks reevaluation.    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  Past surgical history: As above  Medications: No blood thinners, Norvasc, Lipitor, diclofenac gel, hydrochlorothiazide, levothyroxine, lisinopril, metoprolol, Prilosec, spironolactone, tizanidine, Evenity  Allergies: Erythromycin, penicillins  Social history: Non-smoker, denies any tobacco or nicotine use  Family history: No bleeding or clotting problems, or issues with anesthesia    Examination:  /77 (BP Location: Right arm, Patient Position: Sitting, Cuff Size: Adult Regular)   Pulse 79   Ht 1.66 m (5' 5.35\")   Wt 85 kg (187 lb 6.4 oz)   LMP  (LMP Unknown)   SpO2 96%   BMI 30.85 kg/m    Nonlabored breathing  Not distressed  Right residual constricted ear deformity with fracture of the superior helical cartilage and folding over of the superior helical rim  Right vertical ear height of 7 cm, compared with 7.5 on the left    A/P: 73-year-old female with history of congenital constricted ear deformity status post reconstruction presenting with auricular " cartilage fracture causing functional issue with use of hearing aid and wearing glasses    -Discussed options for management.  In my opinion, this could be improved with surgery.  Patient has fracturing of cartilage along the superior margin of the helical rim, which has resulted in folding over of the helical rim, reduction of the vertical projection of the helical rim, and thereby making it difficult for her to wear a hearing aid order rest her glasses on the ear.  Assuming patient is healthy enough to undergo sedation, this can be improved.  It is conceivable that we could do this under wide-awake conditions as well.  Patient would like this.  -Discussed the risks of surgery, including but not limited to: Infection, bleeding, pain, poor scarring, suboptimal aesthetic result, residual deformity, recurrence of deformity, donor site defect, wound healing issues, asymmetries, anesthesia related complication, cardiac arrest.  Despite these risks, patient consents to and would like to proceed with right functional otoplasty, possible use of a cartilage graft from the left ear.  -Patient needs a preoperative history and examination to evaluate and risk stratify from a medical standpoint  -Photography  -Case request to be placed  -A total of 45 minutes was devoted to review of chart, direct face-to-face patient counseling and documentation during this encounter, exclusive of any procedure performed.          Again, thank you for allowing me to participate in the care of your patient.      Sincerely,    Juan Agudelo MD

## 2024-05-24 RX ORDER — CEFAZOLIN SODIUM 2 G/50ML
2 SOLUTION INTRAVENOUS SEE ADMIN INSTRUCTIONS
Status: CANCELLED | OUTPATIENT
Start: 2024-05-24

## 2024-05-24 RX ORDER — CEFAZOLIN SODIUM 2 G/50ML
2 SOLUTION INTRAVENOUS
Status: CANCELLED | OUTPATIENT
Start: 2024-05-24

## 2024-05-24 NOTE — PROGRESS NOTES
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  Patient had multiple surgeries on the right ear, including at the age of 18 and 32 years.  As she has gotten older, she is needed to wear hearing aids and glasses.  Over several years, patient has had an issue with placement of her hearing aid in her glasses due to the residual deformity on the right ear.  She seeks reevaluation.    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  Past surgical history: As above  Medications: No blood thinners, Norvasc, Lipitor, diclofenac gel, hydrochlorothiazide, levothyroxine, lisinopril, metoprolol, Prilosec, spironolactone, tizanidine, Evenity  Allergies: Erythromycin, penicillins  Social history: Non-smoker, denies any tobacco or nicotine use  Family history: No bleeding or clotting problems, or issues with anesthesia    Examination:  /77 (BP Location: Right arm, Patient Position: Sitting, Cuff Size: Adult Regular)   Pulse 79   Ht 1.66 m (5' 5.35\")   Wt 85 kg (187 lb 6.4 oz)   LMP  (LMP Unknown)   SpO2 96%   BMI 30.85 kg/m    Nonlabored breathing  Not distressed  Right residual constricted ear deformity with fracture of the superior helical cartilage and folding over of the superior helical rim  Right vertical ear height of 7 cm, compared with 7.5 on the left    A/P: 73-year-old female with history of 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  In my opinion, this could be improved with surgery.  Patient has fracturing of cartilage along the superior margin of the helical rim, which has resulted in folding over of the helical rim, reduction of the vertical projection " of the helical rim, and thereby making it difficult for her to wear a hearing aid order rest her glasses on the ear.  Assuming patient is healthy enough to undergo sedation, this can be improved.  It is conceivable that we could do this under wide-awake conditions as well.  Patient would like this.  -Discussed the risks of surgery, including but not limited to: Infection, bleeding, pain, poor scarring, suboptimal aesthetic result, residual deformity, recurrence of deformity, donor site defect, wound healing issues, asymmetries, anesthesia related complication, cardiac arrest.  Despite these risks, patient consents to and would like to proceed with right functional otoplasty, possible use of a cartilage graft from the left ear.  -Patient needs a preoperative history and examination to evaluate and risk stratify from a medical standpoint  -Photography  -Case request to be placed  -A total of 45 minutes was devoted to review of chart, direct face-to-face patient counseling and documentation during this encounter, exclusive of any procedure performed.    Juan Agudelo MD, PhD

## 2024-05-28 ENCOUNTER — TELEPHONE (OUTPATIENT)
Dept: PLASTIC SURGERY | Facility: CLINIC | Age: 74
End: 2024-05-28
Payer: COMMERCIAL

## 2024-05-28 PROBLEM — Q17.9 CONGENITAL ABNORMALITY OF EAR: Status: ACTIVE | Noted: 2024-05-22

## 2024-05-29 NOTE — TELEPHONE ENCOUNTER
FUTURE VISIT INFORMATION      SURGERY INFORMATION:  Date: 7/25/24  Location:  or  Surgeon:  Juan Agudelo MD   Anesthesia Type:  MAC with Local  Procedure: right revision functional otoplasty, possible use of a cartilage graft from the left ear   Consult: ov 5/22/24    RECORDS REQUESTED FROM:       Primary Care Provider: Matilde Quiñonez APRN CNP - [facility]    Pertinent Medical History: murmurs, hypertension    Most recent EKG+ Tracing: 3/13/24    Most recent ECHO: 4/2/24

## 2024-05-30 ENCOUNTER — TELEPHONE (OUTPATIENT)
Dept: ANESTHESIOLOGY | Facility: CLINIC | Age: 74
End: 2024-05-30
Payer: COMMERCIAL

## 2024-05-30 NOTE — TELEPHONE ENCOUNTER
Called waitlist patient and offered the following appointment:    Provider: Renée Harris  Appointment Type: New Patient  Date: 5/31  Time: 10:30 AM  Location: [facility] [place] ([place])     Please note there is no guarantee this appointment will be available as it is first come first serve.     Unfortunately, the appointment was taken by another patient while I was working with this patient's appt. rob

## 2024-06-02 ASSESSMENT — ANXIETY QUESTIONNAIRES
7. FEELING AFRAID AS IF SOMETHING AWFUL MIGHT HAPPEN: NOT AT ALL
IF YOU CHECKED OFF ANY PROBLEMS ON THIS QUESTIONNAIRE, HOW DIFFICULT HAVE THESE PROBLEMS MADE IT FOR YOU TO DO YOUR WORK, TAKE CARE OF THINGS AT HOME, OR GET ALONG WITH OTHER PEOPLE: SOMEWHAT DIFFICULT
8. IF YOU CHECKED OFF ANY PROBLEMS, HOW DIFFICULT HAVE THESE MADE IT FOR YOU TO DO YOUR WORK, TAKE CARE OF THINGS AT HOME, OR GET ALONG WITH OTHER PEOPLE?: SOMEWHAT DIFFICULT
4. TROUBLE RELAXING: SEVERAL DAYS
GAD7 TOTAL SCORE: 2
5. BEING SO RESTLESS THAT IT IS HARD TO SIT STILL: SEVERAL DAYS
2. NOT BEING ABLE TO STOP OR CONTROL WORRYING: NOT AT ALL
3. WORRYING TOO MUCH ABOUT DIFFERENT THINGS: NOT AT ALL
6. BECOMING EASILY ANNOYED OR IRRITABLE: NOT AT ALL
7. FEELING AFRAID AS IF SOMETHING AWFUL MIGHT HAPPEN: NOT AT ALL
GAD7 TOTAL SCORE: 2
GAD7 TOTAL SCORE: 2
1. FEELING NERVOUS, ANXIOUS, OR ON EDGE: NOT AT ALL

## 2024-06-02 ASSESSMENT — PAIN SCALES - PAIN ENJOYMENT GENERAL ACTIVITY SCALE (PEG)
INTERFERED_ENJOYMENT_LIFE: 4
AVG_PAIN_PASTWEEK: 5
INTERFERED_GENERAL_ACTIVITY: 4
PEG_TOTALSCORE: 4.33

## 2024-06-03 ENCOUNTER — OFFICE VISIT (OUTPATIENT)
Dept: ANESTHESIOLOGY | Facility: CLINIC | Age: 74
End: 2024-06-03
Attending: ANESTHESIOLOGY
Payer: COMMERCIAL

## 2024-06-03 VITALS
WEIGHT: 187 LBS | BODY MASS INDEX: 31.16 KG/M2 | DIASTOLIC BLOOD PRESSURE: 81 MMHG | SYSTOLIC BLOOD PRESSURE: 124 MMHG | OXYGEN SATURATION: 96 % | HEIGHT: 65 IN | HEART RATE: 81 BPM

## 2024-06-03 DIAGNOSIS — M79.18 MYOFASCIAL PAIN: ICD-10-CM

## 2024-06-03 DIAGNOSIS — M21.371 RIGHT FOOT DROP: ICD-10-CM

## 2024-06-03 DIAGNOSIS — M54.17 LUMBOSACRAL RADICULOPATHY: Primary | ICD-10-CM

## 2024-06-03 DIAGNOSIS — G62.9 PERIPHERAL POLYNEUROPATHY: ICD-10-CM

## 2024-06-03 DIAGNOSIS — M79.2 NEUROPATHIC PAIN: ICD-10-CM

## 2024-06-03 PROCEDURE — 99205 OFFICE O/P NEW HI 60 MIN: CPT

## 2024-06-03 ASSESSMENT — PAIN SCALES - GENERAL: PAINLEVEL: MILD PAIN (3)

## 2024-06-03 NOTE — LETTER
6/3/2024       RE: Nadira Perez  [id number]  [facility] [address]     Dear Colleague,    Thank you for referring your patient, Nadira Perez, to the [facility] [place] at [facility]. Please see a copy of my visit note below.    [facility]     Date of visit: 6/3/2024      Assessment:   Nadira Perez is a 73 year old year old female  who presents to the pain clinic with low back pain. It has been noted that the caudal GREGOR covers her chronic back pain the best thus far. Giong forward we will try to refrain from repeating SI joint and L3-4 GREGOR injection to avoid the side effects of cumulative steroids. Prior discussion with Dr. Umaña -  she is receiving steroid injections in the wrist, need to keep a count of annual steroid injections received.       Assigned to [facility] nursing team.     Visit Diagnoses:  1. Lumbosacral radiculopathy    2. Neuropathic pain    3. Myofascial pain    4. Peripheral polyneuropathy    5. Right foot drop        Plan:  Diagnosis reviewed, treatment option addressed, and risk/benifits discussed.  Self-care instructions given.  I am recommending a multidisciplinary treatment plan to help this patient better manage their pain.      Pain Physical Therapy:  YES   - Continue working with Otto Jauregui PT.     Pain Psychologist to address relaxation, behavioral change, coping style, and other factors important to improvement.  NO - not indicated.     Diagnostic Studies:  BMP on 5/3/24 - GFR 69    Medication Management:   Gabapentin 1200 mg TID and tizanidine 4 mg at bedtime PRN. PCP managing.   Topicals - Diclofenac gel and lidocaine cream   OTC cannabis/CBD - appreciates benefit. Consider [place].     Potential procedures:   Caudal GREGOR - last completed on 1/2/24. Appreciates benefit. May repeat every 3+ months. Advised to contact clinic if interested in repeat  injection.     Other Orders/Referrals:   None     Follow up with MERCEDES Pace CNP in 3-6 months or sooner if needed       Review of Electronic Chart: Today I have also reviewed available medical information in the patient's medical record at [facility] ([facility]) and Care Everywhere (if available), including relevant provider notes, laboratory work, and imaging.     Renée Harris DNP, MERCEDES, AGNP-C  [facility]     -------------------------------------------------    Subjective:    Chief complaint:   Chief Complaint   Patient presents with    Follow Up     Follow-up Lower Back Pain- Radiculopathy       Interval history:  Nadira Perez is a 73 year old female last seen on 3/11/24.  They are a former patient of Dr. Umaña seen by me for the first time in follow up.     Recommendations/plan at the last visit included:  1. Medications: no changes     2. Procedure: The patient will call the clinic for caudal GREGOR when she needs an interventions.      3. Education: return to PT appointments.      4. Referral to acupuncture placed again per the patient request.      Follow up: Renée Harris DNP. I d.w patient that my last day is May 28th and my patients will be transitioned to the providers at the clinic.     Since her last visit, Nadira Perez reports:  -She presents today for transfer of care, previously seeing Dr. Umaña.   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   -She has a consulting business, [employer].   -She engages in piliates, heat/cold, stretching, massage, HEP,   -She recently purchased TENS unit and lidocaine patches,   -Caudal GREGOR on 1/2/24  -Tizanidine and gabapentin from PCP, diclofenac gel from Dr. Conley.   -She has hx of falls, balance concerns.   -She's been dealing with right foot drop       Patient Supplied Answers To the  Pain Questionnaire      6/2/2024     6:37 PM   [facility] -  Patient Entered Questionnaire/Answers   What number best describes your pain right now:  0 =  "No pain  to  10 = Worst pain imaginable 4   How would you describe the pain cramping    sharp    throbbing   Which of the following worsen your pain standing    walking    exercise   Which of the following improve or reduce your pain sitting    exercise    medication    relaxation    thinking about something else   What number best describes your average pain for the past week:  0 = No pain  to  10 = Worst pain imaginable 5   What number best describes your LOWEST pain in past 24 hours:  0 = No pain  to  10 = Worst pain imaginable 2   What number best describes your WORST pain in past 24 hours:  0 = No pain  to  10 = Worst pain imaginable 6   When is your pain worst Constant   What non-medicine treatments have you already had for your pain pain clinic    physical therapy    relaxation training    acupuncture    spine injections (shots)    surgery    exercise          Interval history from last visit with Dr. Umaña on 3/11/24:  The patient reports that she had immediate benefit from the caudal GREGOR. Overall from the various injections she has found the caudal GREGOR the most beneficial. She unfortunately had a significant fall in February, after which she was hospitalized. She is still recovering from her hospitalization and slowly increasing her activity.   The patient received wrist steroid injection with Dr. Conley 2/2024. She is planning to hold off caudal GREGOR at this time and will call the clinic as needed.   She reports that back pain is only 2/10. She reports that her pain score is high due to chest pain from the injury.     HPI from initial visit with Dr. Umaña on 4/20/23:  Nadira Perez is a 72 year old y.o. old female who presents to the pain clinic with pain in her low back \"around the SIJ area\".  Her other pains have been relatively stable and unchanged. Right now she is doing pilate's regularly and taking warm showers/baths that do help her low back. The pain is constantly present but with intermittent " flares. The pain is mostly left sided below the lumbar spine. She has had this kind of pain in the past and discussed it with her provider who recommended she see us for formal evaluation. She has had prior lumbar surgery which is just above where her pain is. She describes her pain characteristics below. She denies any other acute changes in b/b habits or strength.        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 She has chronic low back pain and had L5/S1 fusion 2022. She had a CT of her lumbar spine 5/2022 negative for metastatic disease. Peripheral neuropathy precedes cancer diagnosis      Current Pain Treatments:    Medications:    Hydrocodone 5-325 mg PRN (PCP manages)    Tizanidine 4 mg at bedtime  (PCP)   Gabapentin 1200 mg TID (PCP manages)   OTC cannabis/CBD    2. Other therapies:    Pain PT   Piliates/HEP   Heat/Ice   Caudal GREGOR    Current MME: 0-5     Review of [place] Prescription Monitoring Program (): No concern for abuse or misuse of controlled medications based on this report. Reviewed - appears appropriate.     Annual Controlled Substance Agreement/UDS due date: N/A    Past pain treatments:      Medications:  Current Outpatient Medications   Medication Sig Dispense Refill    acetaminophen (TYLENOL) 500 MG tablet Take 1,000 mg by mouth 3 times daily      Ascorbic Acid (VITAMIN C) 500 MG CHEW Take 1 tablet by mouth daily      atorvastatin (LIPITOR) 80 MG tablet Take 1 tablet (80 mg) by mouth daily 90 tablet 3    Cholecalciferol (VITAMIN D3) 50 MCG (2000 UT) CAPS TAKE 3 CAPSULES (6,000 UNITS) BY MOUTH DAILY. (Patient taking differently: Take 4,000 Units by mouth daily) 270 capsule 3    CRANBERRY EXTRACT PO Take 500 mg by mouth daily      diclofenac (VOLTAREN) 1 % topical gel Apply 2 g topically 4 times daily 100 g 6    gabapentin (NEURONTIN) 300 MG capsule Take 4 capsules  "(1,200 mg) by mouth 3 times daily 1080 capsule 3    HEMP OIL OR EXTRACT OR OTHER CBD CANNABINOID, NOT MEDICAL CANNABIS, THC      hydroCHLOROthiazide (HYDRODIURIL) 25 MG tablet Take 1 tablet (25 mg) by mouth daily 30 tablet 3    levothyroxine (SYNTHROID/LEVOTHROID) 112 MCG tablet TAKE 1/2 TAB BY MOUTH EVERY DAY 45 tablet 3    magnesium 250 MG tablet Take 400 mg by mouth every evening      Melatonin 10 MG TABS tablet Take 10 mg by mouth at bedtime      Multiple Vitamin (MULTI-VITAMIN) per tablet Take 1 tablet by mouth daily      omeprazole (PRILOSEC) 20 MG DR capsule Take 1 capsule (20 mg) by mouth daily for 90 days 90 capsule 0    ondansetron (ZOFRAN-ODT) 4 MG ODT tab Take 1 tablet (4 mg) by mouth every 12 hours as needed for nausea 180 tablet 3    spironolactone (ALDACTONE) 25 MG tablet Take 25 mg by mouth daily      tiZANidine (ZANAFLEX) 4 MG tablet Take 1 tablet (4 mg) by mouth at bedtime 60 tablet 1    amLODIPine (NORVASC) 2.5 MG tablet Take 1 tablet (2.5 mg) by mouth daily 90 tablet 1    carvedilol (COREG) 6.25 MG tablet Take 1 tablet (6.25 mg) by mouth 2 times daily (with meals) 180 tablet 1    lisinopril (ZESTRIL) 20 MG tablet Take 1 tablet (20 mg) by mouth 2 times daily 180 tablet 0       Medical History: any changes in medical history since they were last seen? No     ROS: 10 point ROS neg other than the symptoms noted above in the HPI or patient questionnaire.      Objective:    Physical Exam:  Blood pressure 124/81, pulse 81, height 1.651 m (5' 5\"), weight 84.8 kg (187 lb), SpO2 96%, not currently breastfeeding.  Constitutional: Well developed, well nourished, appears stated age.  Gait: Grossly intact  HEENT: Head atraumatic, normocephalic. Eyes without conjunctival injection or jaundice. Oropharynx clear. Neck supple. No obvious neck masses.  Skin: No rash, lesions, or petechiae of exposed skin.   Psychiatric/mental status: Alert, without lethargy or stupor. Speech fluent. Appropriate affect. Mood normal. " Able to follow commands without difficulty.     Diagnostic Tests/Imaging/Labs:  BMP on 5/3/24 - GFR 69    BILLING TIME DOCUMENTATION:   The total TIME spent on this patient on the date of the encounter/appointment was 60 minutes.      TOTAL TIME includes:   Time spent preparing to see the patient (reviewing records and tests)   Time spent face to face (or over the phone) with the patient   Time spent ordering tests, medications, procedures and referrals   Time spent Referring and communicating with other healthcare professionals   Time spent documenting clinical information in Epic             Again, thank you for allowing me to participate in the care of your patient.      Sincerely,    MERCEDES Pace CNP

## 2024-06-03 NOTE — PROGRESS NOTES
[facility]     Date of visit: 6/3/2024      Assessment:   Nadira Perez is a 73 year old year old female  who presents to the pain clinic with low back pain. It has been noted that the caudal GREGOR covers her chronic back pain the best thus far. Giong forward we will try to refrain from repeating SI joint and L3-4 GREGOR injection to avoid the side effects of cumulative steroids. Prior discussion with Dr. Umaña -  she is receiving steroid injections in the wrist, need to keep a count of annual steroid injections received.       Assigned to [facility].     Visit Diagnoses:  1. Lumbosacral radiculopathy    2. Neuropathic pain    3. Myofascial pain    4. Peripheral polyneuropathy    5. Right foot drop        Plan:  Diagnosis reviewed, treatment option addressed, and risk/benifits discussed.  Self-care instructions given.  I am recommending a multidisciplinary treatment plan to help this patient better manage their pain.      Pain Physical Therapy:  YES   - Continue working with Otto Jauregui PT.     Pain Psychologist to address relaxation, behavioral change, coping style, and other factors important to improvement.  NO - not indicated.     Diagnostic Studies:  BMP on 5/3/24 - GFR 69    Medication Management:   Gabapentin 1200 mg TID and tizanidine 4 mg at bedtime PRN. PCP managing.   Topicals - Diclofenac gel and lidocaine cream   OTC cannabis/CBD - appreciates benefit. [place] program.     Potential procedures:   Caudal GREGOR - last completed on 1/2/24. Appreciates benefit. May repeat every 3+ months. Advised to contact clinic if interested in repeat injection.     Other Orders/Referrals:   None     Follow up with MERCEDES Pace CNP in 3-6 months or sooner if needed       Review of Electronic Chart: Today I have also reviewed available medical information in the patient's medical record at [facility] ([facility]) and Care Everywhere (if available), including relevant provider notes,  laboratory work, and imaging.     Renée Harris DNP, APRN, AGNP-[facility]     -------------------------------------------------    Subjective:    Chief complaint:   Chief Complaint   Patient presents with    Follow Up     Follow-up Lower Back Pain- Radiculopathy       Interval history:  Nadira Perez is a 73 year old female last seen on 3/11/24.  They are a former patient of Dr. Umaña seen by me for the first time in follow up.     Recommendations/plan at the last visit included:  1. Medications: no changes     2. Procedure: The patient will call the clinic for caudal GREGOR when she needs an interventions.      3. Education: return to PT appointments.      4. Referral to acupuncture placed again per the patient request.      Follow up: Renée Harris DNP. I d.w patient that my last day is May 28th and my patients will be transitioned to the providers at the clinic.     Since her last visit, Nadira Perez reports:  -She presents today for transfer of care, previously seeing Dr. Umaña.   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   -She has a consulting business, [employer].   -She engages in piliates, heat/cold, stretching, massage, HEP,   -She recently purchased TENS unit and lidocaine patches,   -Caudal GREGOR on 1/2/24  -Tizanidine and gabapentin from PCP, diclofenac gel from Dr. Conley.   -She has hx of falls, balance concerns.   -She's been dealing with right foot drop       Patient Supplied Answers To the  Pain Questionnaire      6/2/2024     6:37 PM   [facility] -  Patient Entered Questionnaire/Answers   What number best describes your pain right now:  0 = No pain  to  10 = Worst pain imaginable 4   How would you describe the pain cramping    sharp    throbbing   Which of the following worsen your pain standing    walking    exercise   Which of the following improve or reduce your pain sitting    exercise    medication    relaxation    thinking about something else   What number best describes  "your average pain for the past week:  0 = No pain  to  10 = Worst pain imaginable 5   What number best describes your LOWEST pain in past 24 hours:  0 = No pain  to  10 = Worst pain imaginable 2   What number best describes your WORST pain in past 24 hours:  0 = No pain  to  10 = Worst pain imaginable 6   When is your pain worst Constant   What non-medicine treatments have you already had for your pain pain clinic    physical therapy    relaxation training    acupuncture    spine injections (shots)    surgery    exercise          Interval history from last visit with Dr. Umaña on 3/11/24:  The patient reports that she had immediate benefit from the caudal GREGOR. Overall from the various injections she has found the caudal GREGOR the most beneficial. She unfortunately had a significant fall in February, after which she was hospitalized. She is still recovering from her hospitalization and slowly increasing her activity.   The patient received wrist steroid injection with Dr. Conley 2/2024. She is planning to hold off caudal GREGOR at this time and will call the clinic as needed.   She reports that back pain is only 2/10. She reports that her pain score is high due to chest pain from the injury.     HPI from initial visit with Dr. Umaña on 4/20/23:  Nadira Perez is a 72 year old y.o. old female who presents to the pain clinic with pain in her low back \"around the SIJ area\".  Her other pains have been relatively stable and unchanged. Right now she is doing pilate's regularly and taking warm showers/baths that do help her low back. The pain is constantly present but with intermittent flares. The pain is mostly left sided below the lumbar spine. She has had this kind of pain in the past and discussed it with her provider who recommended she see us for formal evaluation. She has had prior lumbar surgery which is just above where her pain is. She describes her pain characteristics below. She denies any other acute changes in b/b " habits or strength.        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 She has chronic low back pain and had L5/S1 fusion 2022. She had a CT of her lumbar spine 5/2022 negative for metastatic disease. Peripheral neuropathy precedes cancer diagnosis      Current Pain Treatments:    Medications:    Hydrocodone 5-325 mg PRN (PCP manages)    Tizanidine 4 mg at bedtime  (PCP)   Gabapentin 1200 mg TID (PCP manages)   OTC cannabis/CBD    2. Other therapies:    Pain PT   Piliates/HEP   Heat/Ice   Caudal GREGOR    Current MME: 0-5     Review of [place] Prescription Monitoring Program (): No concern for abuse or misuse of controlled medications based on this report. Reviewed - appears appropriate.     Annual Controlled Substance Agreement/UDS due date: N/A    Past pain treatments:      Medications:  Current Outpatient Medications   Medication Sig Dispense Refill    acetaminophen (TYLENOL) 500 MG tablet Take 1,000 mg by mouth 3 times daily      Ascorbic Acid (VITAMIN C) 500 MG CHEW Take 1 tablet by mouth daily      atorvastatin (LIPITOR) 80 MG tablet Take 1 tablet (80 mg) by mouth daily 90 tablet 3    Cholecalciferol (VITAMIN D3) 50 MCG (2000 UT) CAPS TAKE 3 CAPSULES (6,000 UNITS) BY MOUTH DAILY. (Patient taking differently: Take 4,000 Units by mouth daily) 270 capsule 3    CRANBERRY EXTRACT PO Take 500 mg by mouth daily      diclofenac (VOLTAREN) 1 % topical gel Apply 2 g topically 4 times daily 100 g 6    gabapentin (NEURONTIN) 300 MG capsule Take 4 capsules (1,200 mg) by mouth 3 times daily 1080 capsule 3    HEMP OIL OR EXTRACT OR OTHER CBD CANNABINOID, NOT MEDICAL CANNABIS, THC      hydroCHLOROthiazide (HYDRODIURIL) 25 MG tablet Take 1 tablet (25 mg) by mouth daily 30 tablet 3    levothyroxine (SYNTHROID/LEVOTHROID) 112 MCG tablet TAKE 1/2 TAB BY MOUTH EVERY DAY 45 tablet 3    magnesium 250 MG tablet  "Take 400 mg by mouth every evening      Melatonin 10 MG TABS tablet Take 10 mg by mouth at bedtime      Multiple Vitamin (MULTI-VITAMIN) per tablet Take 1 tablet by mouth daily      omeprazole (PRILOSEC) 20 MG DR capsule Take 1 capsule (20 mg) by mouth daily for 90 days 90 capsule 0    ondansetron (ZOFRAN-ODT) 4 MG ODT tab Take 1 tablet (4 mg) by mouth every 12 hours as needed for nausea 180 tablet 3    spironolactone (ALDACTONE) 25 MG tablet Take 25 mg by mouth daily      tiZANidine (ZANAFLEX) 4 MG tablet Take 1 tablet (4 mg) by mouth at bedtime 60 tablet 1    amLODIPine (NORVASC) 2.5 MG tablet Take 1 tablet (2.5 mg) by mouth daily 90 tablet 1    carvedilol (COREG) 6.25 MG tablet Take 1 tablet (6.25 mg) by mouth 2 times daily (with meals) 180 tablet 1    lisinopril (ZESTRIL) 20 MG tablet Take 1 tablet (20 mg) by mouth 2 times daily 180 tablet 0       Medical History: any changes in medical history since they were last seen? No     ROS: 10 point ROS neg other than the symptoms noted above in the HPI or patient questionnaire.      Objective:    Physical Exam:  Blood pressure 124/81, pulse 81, height 1.651 m (5' 5\"), weight 84.8 kg (187 lb), SpO2 96%, not currently breastfeeding.  Constitutional: Well developed, well nourished, appears stated age.  Gait: Grossly intact  HEENT: Head atraumatic, normocephalic. Eyes without conjunctival injection or jaundice. Oropharynx clear. Neck supple. No obvious neck masses.  Skin: No rash, lesions, or petechiae of exposed skin.   Psychiatric/mental status: Alert, without lethargy or stupor. Speech fluent. Appropriate affect. Mood normal. Able to follow commands without difficulty.     Diagnostic Tests/Imaging/Labs:  BMP on 5/3/24 - GFR 69    BILLING TIME DOCUMENTATION:   The total TIME spent on this patient on the date of the encounter/appointment was 60 minutes.      TOTAL TIME includes:   Time spent preparing to see the patient (reviewing records and tests)   Time spent face to " face (or over the phone) with the patient   Time spent ordering tests, medications, procedures and referrals   Time spent Referring and communicating with other healthcare professionals   Time spent documenting clinical information in Epic

## 2024-06-03 NOTE — NURSING NOTE
Patient presents with:  Follow Up: Follow-up Lower Back Pain- Radiculopathy      Mild Pain (3)     Pain Medications       Analgesics Other Refills Start End     acetaminophen (TYLENOL) 500 MG tablet --  --    Sig - Route: Take 1,000 mg by mouth 3 times daily - Oral    Class: Historical            What medications are you using for pain? Tylenol, gabapentin    (New patients only) Have you been seen by another pain clinic/ provider? no    (Return Patients only) What refills are you needing today? no

## 2024-06-04 ENCOUNTER — TELEPHONE (OUTPATIENT)
Dept: ANESTHESIOLOGY | Facility: CLINIC | Age: 74
End: 2024-06-04
Payer: COMMERCIAL

## 2024-06-04 NOTE — TELEPHONE ENCOUNTER
Patient confirmed scheduled appointment:     Date: 9/6  Time: 1 PM  Visit type: Return pain  Provider: Renée Harris  Location: [facility]

## 2024-06-05 DIAGNOSIS — H35.372 EPIRETINAL MEMBRANE (ERM) OF LEFT EYE: Primary | ICD-10-CM

## 2024-06-05 DIAGNOSIS — H40.003 GLAUCOMA SUSPECT OF BOTH EYES: ICD-10-CM

## 2024-06-07 NOTE — TELEPHONE ENCOUNTER
RECORDS RECEIVED FROM:    DATE RECEIVED:    NOTES STATUS DETAILS   OFFICE NOTE from referring provider  Internal 5-22-24 Jammie   OFFICE NOTE from other cardiologists  Internal 3-13-24 Camron   RECORDS from hospital/ED Internal 3-12-24   MEDICATION LIST Internal    GENERAL CARDIO RECORDS   (ALL APPOINTMENT TYPES)     LABS (CBC,BMP,CMP, TSH) Internal    EKG (STRIPS & REPORTS) Internal 3-13-24   MONITORS (STRIPS & REPORTS) Internal 11-14-23   ECHOS (IMAGES AND REPORTS) Internal 4-2-24   STRESS TESTS (IMAGES AND REPORTS) N/A    cMRI (IMAGES AND REPORTS) Internal 4-11-24   Cardiac cath (IMAGES AND REPORTS) N/A    CT/CTA (IMAGES AND REPORTS) Internal 2-16-24

## 2024-06-10 ENCOUNTER — OFFICE VISIT (OUTPATIENT)
Dept: CARDIOLOGY | Facility: CLINIC | Age: 74
End: 2024-06-10
Attending: INTERNAL MEDICINE
Payer: COMMERCIAL

## 2024-06-10 ENCOUNTER — PRE VISIT (OUTPATIENT)
Dept: CARDIOLOGY | Facility: CLINIC | Age: 74
End: 2024-06-10
Payer: COMMERCIAL

## 2024-06-10 VITALS
WEIGHT: 189.5 LBS | HEART RATE: 78 BPM | BODY MASS INDEX: 31.53 KG/M2 | OXYGEN SATURATION: 98 % | SYSTOLIC BLOOD PRESSURE: 126 MMHG | DIASTOLIC BLOOD PRESSURE: 78 MMHG

## 2024-06-10 DIAGNOSIS — I42.9 SECONDARY CARDIOMYOPATHY (H): ICD-10-CM

## 2024-06-10 DIAGNOSIS — Z85.3 HX: BREAST CANCER: ICD-10-CM

## 2024-06-10 DIAGNOSIS — I10 BENIGN ESSENTIAL HYPERTENSION: Primary | ICD-10-CM

## 2024-06-10 PROCEDURE — 99214 OFFICE O/P EST MOD 30 MIN: CPT | Mod: GC | Performed by: INTERNAL MEDICINE

## 2024-06-10 PROCEDURE — 93010 ELECTROCARDIOGRAM REPORT: CPT | Performed by: INTERNAL MEDICINE

## 2024-06-10 PROCEDURE — G0463 HOSPITAL OUTPT CLINIC VISIT: HCPCS | Performed by: INTERNAL MEDICINE

## 2024-06-10 PROCEDURE — 93005 ELECTROCARDIOGRAM TRACING: CPT

## 2024-06-10 RX ORDER — AMLODIPINE BESYLATE 2.5 MG/1
2.5 TABLET ORAL DAILY
Qty: 90 TABLET | Refills: 1 | Status: SHIPPED | OUTPATIENT
Start: 2024-06-10 | End: 2024-08-16

## 2024-06-10 RX ORDER — CARVEDILOL 6.25 MG/1
6.25 TABLET ORAL 2 TIMES DAILY WITH MEALS
Qty: 180 TABLET | Refills: 1 | Status: SHIPPED | OUTPATIENT
Start: 2024-06-10 | End: 2024-07-08

## 2024-06-10 RX ORDER — LISINOPRIL 20 MG/1
20 TABLET ORAL 2 TIMES DAILY
Qty: 180 TABLET | Refills: 0 | Status: SHIPPED | OUTPATIENT
Start: 2024-06-10 | End: 2024-09-13

## 2024-06-10 ASSESSMENT — PAIN SCALES - GENERAL: PAINLEVEL: NO PAIN (0)

## 2024-06-10 NOTE — PROGRESS NOTES
HISTORY:    Ms. Nadira Perez is a pleasant 73 year old female with a medical history notable for    Left-sided stage IIIC inflammatory breast cancer, ER/[place] positive, HER2 negative, diagnosed in 2007.  Treated with neoadjuvant chemo (anthracycline), bilateral mastectomy (residual disease), radiation, and 14 years of endocrine therapy.   2. PVCs  3. History of stress cardiomyopathy with LVEF down to 35% on the day of mechanical fall 2/16/2024 improved to 50 to 55%).  4. Hypertension  5. Dyslipidemia    She denies chest pain, dyspnea at rest, palpitations, edema or syncope.     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    Corneal opacity    Hypothyroidism    Osteopenia, unspecified location    Aromatase inhibitor use    Chronic pain of right knee    DDD (degenerative disc disease), lumbar    Degenerative scoliosis in adult patient    Carpal tunnel syndrome of right wrist    Peripheral neuropathy    Spinal stenosis of lumbar region with neurogenic claudication    Spondylolisthesis of lumbar region    Brain lesion    Dermatochalasis of both upper eyelids    S/P spinal fusion    Chronic bilateral low back pain    PVD (posterior vitreous detachment), left eye    Vitreous opacities of left eye    Closed nondisplaced fracture of lateral malleolus of left fibula, initial encounter    Acute left ankle pain    Sacroiliitis (H24)    Lumbar radiculopathy    Lumbosacral radiculopathy    Diabetes mellitus, type 2 (H)     Abnormal electrocardiogram    Ejection fraction < 50%    Closed fracture of sternum, unspecified portion of sternum, initial encounter    Congenital abnormality of ear        FAMILY HISTORY:  Family History   Problem Relation Age of Onset    Neurologic Disorder Mother         Anuerysm of Cerebral Artery, Dementia    Diabetes Mother     Thyroid Disease Mother         Hyperthyroidism (goiter)    Cerebrovascular Disease Mother         Hemorrhagic    Dementia Mother     Osteoporosis Mother     Hyperlipidemia Mother     Heart Disease Father         AAA    Hypertension Father     Coronary Artery Disease Father     Hyperlipidemia Father     Mental Illness Father         Schizophrenia?    Dementia Brother         hydrocephalis    Circulatory Brother         Perihperal Neurophathy    Diabetes Maternal Grandmother         Presumed Type 2    Asthma Maternal Grandmother     Chronic Obstructive Pulmonary Disease Maternal Grandfather         father    Asthma Maternal Grandfather     Diabetes Maternal Aunt         x2    Melanoma Maternal Aunt     Glaucoma Maternal Aunt     Breast Cancer Cousin     Breast Cancer Cousin     Dementia Other     Cancer Other         malignant melanoma    Hypertension Other     Hypertension Other     Cerebrovascular Disease Other     Cerebrovascular Disease Other     Obesity Other     Respiratory Other     Cancer Other     Diabetes Other     Asthma Other     Other Cancer Other         Malignant melanoma    Obesity Other     Macular Degeneration No family hx of     Kidney Disease No family hx of     Thrombosis No family hx of     Arthritis No family hx of     Depression No family hx of     Substance Abuse No family hx of     Cystic Fibrosis No family hx of     Early Death No family hx of     Coronary Artery Disease Early Onset No family hx of     Heart Failure No family hx of     Bleeding Diathesis No family hx of     Ovarian Cancer No family hx of     Uterine Cancer No family hx of     Prostate  Cancer No family hx of     Colorectal Cancer No family hx of     Pancreatic Cancer No family hx of     Lung Cancer No family hx of     Autoimmune Disease No family hx of     Unknown/Adopted No family hx of     Genetic Disorder No family hx of     Bleeding Disorder No family hx of     Clotting Disorder No family hx of     Anesthesia Reaction No family hx of        SOCIAL HISTORY:  Social History     Tobacco Use    Smoking status: Never     Passive exposure: Never    Smokeless tobacco: Never   Vaping Use    Vaping status: Never Used   Substance Use Topics    Alcohol use: Yes     Alcohol/week: 7.0 standard drinks of alcohol     Types: 7 Glasses of wine per week     Comment: Rare if ever    Drug use: Yes     Types: Marijuana     Comment: Smoke through water filter for chronic back pain PRN.        CURRENT MEDICATIONS:  Current Outpatient Medications   Medication Sig Dispense Refill    acetaminophen (TYLENOL) 500 MG tablet Take 1,000 mg by mouth 3 times daily      amLODIPine (NORVASC) 5 MG tablet Take 1 tablet (5 mg) by mouth daily 90 tablet 3    Ascorbic Acid (VITAMIN C) 500 MG CHEW Take 1 tablet by mouth daily      atorvastatin (LIPITOR) 80 MG tablet Take 1 tablet (80 mg) by mouth daily 90 tablet 3    Cholecalciferol (VITAMIN D3) 50 MCG (2000 UT) CAPS TAKE 3 CAPSULES (6,000 UNITS) BY MOUTH DAILY. (Patient taking differently: Take 4,000 Units by mouth daily) 270 capsule 3    CRANBERRY EXTRACT PO Take 500 mg by mouth daily      diclofenac (VOLTAREN) 1 % topical gel Apply 2 g topically 4 times daily 100 g 6    gabapentin (NEURONTIN) 300 MG capsule Take 4 capsules (1,200 mg) by mouth 3 times daily 1080 capsule 3    HEMP OIL OR EXTRACT OR OTHER CBD CANNABINOID, NOT MEDICAL CANNABIS, THC      hydroCHLOROthiazide (HYDRODIURIL) 25 MG tablet Take 1 tablet (25 mg) by mouth daily 30 tablet 3    levothyroxine (SYNTHROID/LEVOTHROID) 112 MCG tablet TAKE 1/2 TAB BY MOUTH EVERY DAY 45 tablet 3    lisinopril (ZESTRIL) 40 MG tablet Take  1 tablet (40 mg) by mouth daily 90 tablet 3    magnesium 250 MG tablet Take 400 mg by mouth every evening      Melatonin 10 MG TABS tablet Take 10 mg by mouth at bedtime      metoprolol succinate ER (TOPROL XL) 50 MG 24 hr tablet Take 1 tablet (50 mg) by mouth daily 90 tablet 3    Multiple Vitamin (MULTI-VITAMIN) per tablet Take 1 tablet by mouth daily      omeprazole (PRILOSEC) 20 MG DR capsule Take 1 capsule (20 mg) by mouth daily for 90 days 90 capsule 0    ondansetron (ZOFRAN-ODT) 4 MG ODT tab Take 1 tablet (4 mg) by mouth every 12 hours as needed for nausea 180 tablet 3    spironolactone (ALDACTONE) 25 MG tablet Take 25 mg by mouth daily      tiZANidine (ZANAFLEX) 4 MG tablet Take 1 tablet (4 mg) by mouth at bedtime 60 tablet 1       EXAM:  /78 (BP Location: Right arm, Patient Position: Chair, Cuff Size: Adult Regular)   Pulse 78   Wt 86 kg (189 lb 8 oz)   LMP  (LMP Unknown)   SpO2 98%   BMI 31.53 kg/m      In general, the patient is in no apparent distress.        Neck: No JVD. No thyromegaly  Heart: regular with normal S1, S2. No murmur. No audible rub or gallop.    Lungs: Clear bilaterally.  No rhonchi, wheezes, rales.   Extremities: No edema.  The pulses are 2+at the radial bilaterally.      I have independently reviewed this patient's relevant laboratory and cardiac data :    Recent Labs   Lab Test 03/07/24  1451 04/15/22  1632   CHOL 214* 167   HDL 56 67   * 71   TRIG 237* 145      Recent Labs   Lab Test 11/30/23  1218 10/27/22  1428   AST 25 22     Recent Labs   Lab Test 11/30/23  1218 10/27/22  1428   ALT 21 16     Recent Labs   Lab Test 05/03/24  1143 04/05/24  1159    141   POTASSIUM 3.8 4.0   CHLORIDE 101 103   CO2 26 24   ANIONGAP 12 14   BUN 24.1* 33.4*   CR 0.89 0.90     Recent Labs   Lab Test 02/17/24  0640 02/16/24  1005   WBC 10.3 9.8   RBC 4.08 4.23   HGB 11.9 12.3   MCV 91 93    306     Recent Labs   Lab Test 03/07/24  1451 11/30/23  1218   A1C 6.2* 6.5*      Recent Labs   Lab Test 12/04/23  1642 11/30/23  1218   TSH 2.03 2.07     ECG today - sinus rhyhtm    CMR: 4/11/24  CONCLUSIONS:   1. Mildly reduced LV systolic function with LVEF of 50%.  2. Late gadolinium enhancement imaging shows focal area of mid inferolateral wall subendocardial delayed enhancement that could represent a small size infarct.    TTE: 4/2/24  Interpretation Summary  Left ventricular function is decreased. The ejection fraction is 50-55%  (borderline). Mid inferolateral akinesis is present.  Global right ventricular function is normal.  No pericardial effusion is present.    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 LCx infarction     Overall, she is doing well without angina or heart failure. She has findings of a samll inferolateral infarction but since she is not symptomatic will defer coronary CTA/invasive angiogram at this point. She is on a statin and antihypertensives. She has significant lower extremity swelling in both legs and has gingival hyperplasia which could be as a result of amlodipine. She is interested in pursuing alternative antihypertensives. Our recommendations are -     Lisinopril 20 mg bid  Reduce amlodipine 2.5 mg daily  Stop metoprolol ER  Start carvedilol 6.25 mg bid   Continue Hydrochlorothiazide 25mg daily   Continue Atorvastatin 80mg daily   Continue spironolactone 25mg daily   Follow up with Dr Rascon in 1 month - virtual visit.     ATTENDING ATTESTATION:  This patient has been seen and examined by me Honey 10,  2024 with Dr. Shea, cardiology fellow. I have reviewed the vitals, laboratory and imaging data relevant to this patient's care. I have edited this note to reflect our joint assessment and plan, and discussed the plan with the patient.    Cinthia Rascon MD, MS  Professor of Medicine  Cardiovascular Medicine

## 2024-06-10 NOTE — PATIENT INSTRUCTIONS
You were seen in the Cardiology Clinic today by: Dr. Rascon    Plan:   Medication Changes:   STOP Metoprolol  DECREASE Amlodipine to 2.5 mg daily   TAKE Lisinopril 20 mg TWICE daily  START Carvedilol 6.25 mg TWICE daily     Follow up Visit:  Video visit with Dr. Rascon in 4-6 weeks.     If you have further questions, please utilize [email or web address] to contact us.     Your Care Team:    Cardiology   Telephone Number     Nurse Line  Jose Lo RN    [phone]     For scheduling appointments:  [phone]           On-call cardiologist for after hours or on weekends:   [phone], option #4, and ask to speak to the on-call cardiologist.     Cardiovascular Clinic:   [address] [place] [address]. [place] [address]      As always, Thank you for trusting us with your health care needs!

## 2024-06-10 NOTE — LETTER
6/10/2024      RE: Nadira Perez  [id number]  [facility] [address]       Dear Colleague,    Thank you for the opportunity to participate in the care of your patient, Nadira Perez, at the [facility] [place] [facility]. Please see a copy of my visit note below.    HISTORY:    Ms. Nadira Perez is a pleasant 73 year old female with a medical history notable for    Left-sided stage IIIC inflammatory breast cancer, ER/TN positive, HER2 negative, diagnosed in 2007.  Treated with neoadjuvant chemo (anthracycline), bilateral mastectomy (residual disease), radiation, and 14 years of endocrine therapy.   2. PVCs  3. History of stress cardiomyopathy with LVEF down to 35% on the day of mechanical fall 2/16/2024 improved to 50 to 55%).  4. Hypertension  5. Dyslipidemia    She denies chest pain, dyspnea at rest, palpitations, edema or syncope.     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     Corneal opacity     Hypothyroidism     Osteopenia, unspecified location     Aromatase inhibitor use     Chronic pain of right knee     DDD (degenerative disc disease), lumbar     Degenerative scoliosis in adult patient     Carpal tunnel syndrome of right wrist     Peripheral neuropathy     Spinal stenosis of lumbar region with neurogenic claudication     Spondylolisthesis of lumbar region     Brain lesion     Dermatochalasis of  both upper eyelids     S/P spinal fusion     Chronic bilateral low back pain     PVD (posterior vitreous detachment), left eye     Vitreous opacities of left eye     Closed nondisplaced fracture of lateral malleolus of left fibula, initial encounter     Acute left ankle pain     Sacroiliitis (H24)     Lumbar radiculopathy     Lumbosacral radiculopathy     Diabetes mellitus, type 2 (H)     Abnormal electrocardiogram     Ejection fraction < 50%     Closed fracture of sternum, unspecified portion of sternum, initial encounter     Congenital abnormality of ear        FAMILY HISTORY:  Family History   Problem Relation Age of Onset     Neurologic Disorder Mother         Anuerysm of Cerebral Artery, Dementia     Diabetes Mother      Thyroid Disease Mother         Hyperthyroidism (goiter)     Cerebrovascular Disease Mother         Hemorrhagic     Dementia Mother      Osteoporosis Mother      Hyperlipidemia Mother      Heart Disease Father         AAA     Hypertension Father      Coronary Artery Disease Father      Hyperlipidemia Father      Mental Illness Father         Schizophrenia?     Dementia Brother         hydrocephalis     Circulatory Brother         Perihperal Neurophathy     Diabetes Maternal Grandmother         Presumed Type 2     Asthma Maternal Grandmother      Chronic Obstructive Pulmonary Disease Maternal Grandfather         father     Asthma Maternal Grandfather      Diabetes Maternal Aunt         x2     Melanoma Maternal Aunt      Glaucoma Maternal Aunt      Breast Cancer Cousin      Breast Cancer Cousin      Dementia Other      Cancer Other         malignant melanoma     Hypertension Other      Hypertension Other      Cerebrovascular Disease Other      Cerebrovascular Disease Other      Obesity Other      Respiratory Other      Cancer Other      Diabetes Other      Asthma Other      Other Cancer Other         Malignant melanoma     Obesity Other      Macular Degeneration No family hx of      Kidney Disease  No family hx of      Thrombosis No family hx of      Arthritis No family hx of      Depression No family hx of      Substance Abuse No family hx of      Cystic Fibrosis No family hx of      Early Death No family hx of      Coronary Artery Disease Early Onset No family hx of      Heart Failure No family hx of      Bleeding Diathesis No family hx of      Ovarian Cancer No family hx of      Uterine Cancer No family hx of      Prostate Cancer No family hx of      Colorectal Cancer No family hx of      Pancreatic Cancer No family hx of      Lung Cancer No family hx of      Autoimmune Disease No family hx of      Unknown/Adopted No family hx of      Genetic Disorder No family hx of      Bleeding Disorder No family hx of      Clotting Disorder No family hx of      Anesthesia Reaction No family hx of        SOCIAL HISTORY:  Social History     Tobacco Use     Smoking status: Never     Passive exposure: Never     Smokeless tobacco: Never   Vaping Use     Vaping status: Never Used   Substance Use Topics     Alcohol use: Yes     Alcohol/week: 7.0 standard drinks of alcohol     Types: 7 Glasses of wine per week     Comment: Rare if ever     Drug use: Yes     Types: Marijuana     Comment: Smoke through water filter for chronic back pain PRN.        CURRENT MEDICATIONS:  Current Outpatient Medications   Medication Sig Dispense Refill     acetaminophen (TYLENOL) 500 MG tablet Take 1,000 mg by mouth 3 times daily       amLODIPine (NORVASC) 5 MG tablet Take 1 tablet (5 mg) by mouth daily 90 tablet 3     Ascorbic Acid (VITAMIN C) 500 MG CHEW Take 1 tablet by mouth daily       atorvastatin (LIPITOR) 80 MG tablet Take 1 tablet (80 mg) by mouth daily 90 tablet 3     Cholecalciferol (VITAMIN D3) 50 MCG (2000 UT) CAPS TAKE 3 CAPSULES (6,000 UNITS) BY MOUTH DAILY. (Patient taking differently: Take 4,000 Units by mouth daily) 270 capsule 3     CRANBERRY EXTRACT PO Take 500 mg by mouth daily       diclofenac (VOLTAREN) 1 % topical gel Apply 2  g topically 4 times daily 100 g 6     gabapentin (NEURONTIN) 300 MG capsule Take 4 capsules (1,200 mg) by mouth 3 times daily 1080 capsule 3     HEMP OIL OR EXTRACT OR OTHER CBD CANNABINOID, NOT MEDICAL CANNABIS, THC       hydroCHLOROthiazide (HYDRODIURIL) 25 MG tablet Take 1 tablet (25 mg) by mouth daily 30 tablet 3     levothyroxine (SYNTHROID/LEVOTHROID) 112 MCG tablet TAKE 1/2 TAB BY MOUTH EVERY DAY 45 tablet 3     lisinopril (ZESTRIL) 40 MG tablet Take 1 tablet (40 mg) by mouth daily 90 tablet 3     magnesium 250 MG tablet Take 400 mg by mouth every evening       Melatonin 10 MG TABS tablet Take 10 mg by mouth at bedtime       metoprolol succinate ER (TOPROL XL) 50 MG 24 hr tablet Take 1 tablet (50 mg) by mouth daily 90 tablet 3     Multiple Vitamin (MULTI-VITAMIN) per tablet Take 1 tablet by mouth daily       omeprazole (PRILOSEC) 20 MG DR capsule Take 1 capsule (20 mg) by mouth daily for 90 days 90 capsule 0     ondansetron (ZOFRAN-ODT) 4 MG ODT tab Take 1 tablet (4 mg) by mouth every 12 hours as needed for nausea 180 tablet 3     spironolactone (ALDACTONE) 25 MG tablet Take 25 mg by mouth daily       tiZANidine (ZANAFLEX) 4 MG tablet Take 1 tablet (4 mg) by mouth at bedtime 60 tablet 1       EXAM:  /78 (BP Location: Right arm, Patient Position: Chair, Cuff Size: Adult Regular)   Pulse 78   Wt 86 kg (189 lb 8 oz)   LMP  (LMP Unknown)   SpO2 98%   BMI 31.53 kg/m      In general, the patient is in no apparent distress.        Neck: No JVD. No thyromegaly  Heart: regular with normal S1, S2. No murmur. No audible rub or gallop.    Lungs: Clear bilaterally.  No rhonchi, wheezes, rales.   Extremities: No edema.  The pulses are 2+at the radial bilaterally.      I have independently reviewed this patient's relevant laboratory and cardiac data :    Recent Labs   Lab Test 03/07/24  1451 04/15/22  1632   CHOL 214* 167   HDL 56 67   * 71   TRIG 237* 145      Recent Labs   Lab Test 11/30/23  1218  10/27/22  1428   AST 25 22     Recent Labs   Lab Test 11/30/23  1218 10/27/22  1428   ALT 21 16     Recent Labs   Lab Test 05/03/24  1143 04/05/24  1159    141   POTASSIUM 3.8 4.0   CHLORIDE 101 103   CO2 26 24   ANIONGAP 12 14   BUN 24.1* 33.4*   CR 0.89 0.90     Recent Labs   Lab Test 02/17/24  0640 02/16/24  1005   WBC 10.3 9.8   RBC 4.08 4.23   HGB 11.9 12.3   MCV 91 93    306     Recent Labs   Lab Test 03/07/24  1451 11/30/23  1218   A1C 6.2* 6.5*     Recent Labs   Lab Test 12/04/23  1642 11/30/23  1218   TSH 2.03 2.07     ECG today - sinus rhyhtm    CMR: 4/11/24  CONCLUSIONS:   1. Mildly reduced LV systolic function with LVEF of 50%.  2. Late gadolinium enhancement imaging shows focal area of mid inferolateral wall subendocardial delayed enhancement that could represent a small size infarct.    TTE: 4/2/24  Interpretation Summary  Left ventricular function is decreased. The ejection fraction is 50-55%  (borderline). Mid inferolateral akinesis is present.  Global right ventricular function is normal.  No pericardial effusion is present.    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 LCx infarction     Overall, she is doing well without angina or heart failure. She has findings of a samll inferolateral infarction but since she is not symptomatic will defer coronary CTA/invasive angiogram at this point. She is on a statin and antihypertensives. She has significant lower extremity swelling in both legs and has gingival hyperplasia which could be as  a result of amlodipine. She is interested in pursuing alternative antihypertensives. Our recommendations are -     Lisinopril 20 mg bid  Reduce amlodipine 2.5 mg daily  Stop metoprolol ER  Start carvedilol 6.25 mg bid   Continue Hydrochlorothiazide 25mg daily   Continue Atorvastatin 80mg daily   Continue spironolactone 25mg daily   Follow up with Dr Rascon in 1 month - virtual visit.     ATTENDING ATTESTATION:  This patient has been seen and examined by me Honey 10, 2024 with Dr. Shea, cardiology fellow. I have reviewed the vitals, laboratory and imaging data relevant to this patient's care. I have edited this note to reflect our joint assessment and plan, and discussed the plan with the patient.    Cinthia Rascon MD, MS  Professor of Medicine  Cardiovascular Medicine

## 2024-06-10 NOTE — NURSING NOTE
Chief Complaint   Patient presents with    New Patient     Honey 10, 2024 - Dr. Rascon: NEW cardio-oncology per pt request    PMH: Breast cancer, Reduced EF, HLD, HTN         Vitals were taken, medications reconciled, and EKG was performed.    Jemal Gottlieb, EMT  1:24 PM

## 2024-06-11 LAB
ATRIAL RATE - MUSE: 78 BPM
DIASTOLIC BLOOD PRESSURE - MUSE: NORMAL MMHG
INTERPRETATION ECG - MUSE: NORMAL
P AXIS - MUSE: 16 DEGREES
PR INTERVAL - MUSE: 152 MS
QRS DURATION - MUSE: 114 MS
QT - MUSE: 422 MS
QTC - MUSE: 481 MS
R AXIS - MUSE: -66 DEGREES
SYSTOLIC BLOOD PRESSURE - MUSE: NORMAL MMHG
T AXIS - MUSE: 13 DEGREES
VENTRICULAR RATE- MUSE: 78 BPM

## 2024-06-11 NOTE — PATIENT INSTRUCTIONS
Pain Physical Therapy:  YES   - Continue working with Otto Jauregui PT.     Pain Psychologist to address relaxation, behavioral change, coping style, and other factors important to improvement.  NO - not indicated.     Diagnostic Studies:  BMP on 5/3/24 - GFR 69    Medication Management:   Gabapentin 1200 mg TID and tizanidine 4 mg at bedtime PRN. PCP managing.   Topicals - Diclofenac gel and lidocaine cream   OTC cannabis/CBD - appreciates benefit. Consider [place] program.     Potential procedures:   Caudal GREGOR - last completed on 1/2/24. Appreciates benefit. May repeat every 3+ months. Advised to contact clinic if interested in repeat injection.     Other Orders/Referrals:   None     Follow up with MERCEDES Pace CNP in 3-6 months or sooner if needed

## 2024-06-16 DIAGNOSIS — S93.512A SPRAIN OF INTERPHALANGEAL JOINT OF LEFT GREAT TOE, INITIAL ENCOUNTER: ICD-10-CM

## 2024-06-18 ENCOUNTER — OFFICE VISIT (OUTPATIENT)
Dept: OPHTHALMOLOGY | Facility: CLINIC | Age: 74
End: 2024-06-18
Attending: OPHTHALMOLOGY
Payer: COMMERCIAL

## 2024-06-18 DIAGNOSIS — H52.13 MYOPIA OF BOTH EYES: ICD-10-CM

## 2024-06-18 DIAGNOSIS — H43.392 VITREOUS OPACITIES OF LEFT EYE: ICD-10-CM

## 2024-06-18 DIAGNOSIS — H43.812 PVD (POSTERIOR VITREOUS DETACHMENT), LEFT EYE: ICD-10-CM

## 2024-06-18 DIAGNOSIS — H35.372 EPIRETINAL MEMBRANE (ERM) OF LEFT EYE: Primary | ICD-10-CM

## 2024-06-18 DIAGNOSIS — Z96.1 PSEUDOPHAKIA OF BOTH EYES: ICD-10-CM

## 2024-06-18 DIAGNOSIS — H40.003 GLAUCOMA SUSPECT OF BOTH EYES: ICD-10-CM

## 2024-06-18 PROCEDURE — 92133 CPTRZD OPH DX IMG PST SGM ON: CPT | Performed by: OPHTHALMOLOGY

## 2024-06-18 PROCEDURE — 92134 CPTRZ OPH DX IMG PST SGM RTA: CPT | Performed by: OPHTHALMOLOGY

## 2024-06-18 PROCEDURE — 99214 OFFICE O/P EST MOD 30 MIN: CPT | Mod: GC | Performed by: OPHTHALMOLOGY

## 2024-06-18 PROCEDURE — G0463 HOSPITAL OUTPT CLINIC VISIT: HCPCS | Performed by: OPHTHALMOLOGY

## 2024-06-18 PROCEDURE — 99207 OCT OPTIC NERVE RNFL SPECTRALIS OU (BOTH EYES): CPT | Mod: 26 | Performed by: OPHTHALMOLOGY

## 2024-06-18 ASSESSMENT — REFRACTION_WEARINGRX
OS_CYLINDER: +3.00
OS_SPHERE: -1.50
OD_AXIS: 178
SPECS_TYPE: COMPUTER
OD_ADD: +2.50
OD_CYLINDER: +0.75
OS_SPHERE: -0.75
OS_AXIS: 045
OS_ADD: +2.50
OD_ADD: +2.50
OD_CYLINDER: +0.75
OS_AXIS: 046
OD_AXIS: 178
OD_SPHERE: +0.50
OS_CYLINDER: +3.25
SPECS_TYPE: PAL
OD_SPHERE: -0.25
OS_ADD: +2.50

## 2024-06-18 ASSESSMENT — CONF VISUAL FIELD
OS_INFERIOR_TEMPORAL_RESTRICTION: 0
OD_SUPERIOR_NASAL_RESTRICTION: 0
OS_SUPERIOR_TEMPORAL_RESTRICTION: 0
OD_SUPERIOR_TEMPORAL_RESTRICTION: 0
OD_INFERIOR_TEMPORAL_RESTRICTION: 0
METHOD: COUNTING FINGERS
OD_INFERIOR_NASAL_RESTRICTION: 0
OS_NORMAL: 1
OS_INFERIOR_NASAL_RESTRICTION: 0
OD_NORMAL: 1
OS_SUPERIOR_NASAL_RESTRICTION: 0

## 2024-06-18 ASSESSMENT — EXTERNAL EXAM - RIGHT EYE: OD_EXAM: NORMAL

## 2024-06-18 ASSESSMENT — REFRACTION_MANIFEST
OS_CYLINDER: +3.50
OD_ADD: +2.50
OD_CYLINDER: +1.25
OD_AXIS: 001
OD_SPHERE: +0.25
OS_ADD: +2.50
OS_SPHERE: -0.75
OS_AXIS: 046

## 2024-06-18 ASSESSMENT — SLIT LAMP EXAM - LIDS
COMMENTS: NORMAL
COMMENTS: NORMAL

## 2024-06-18 ASSESSMENT — VISUAL ACUITY
OD_PH_CC+: +1
OD_PH_CC: 20/30
OS_CC+: +2
METHOD: SNELLEN - LINEAR
OD_CC+: -1
OS_CC: 20/30
OS_PH_CC+: +1
OS_PH_CC: 20/25
OD_CC: 20/40

## 2024-06-18 ASSESSMENT — TONOMETRY
OS_IOP_MMHG: 18
IOP_METHOD: ICARE
OD_IOP_MMHG: 18

## 2024-06-18 ASSESSMENT — EXTERNAL EXAM - LEFT EYE: OS_EXAM: NORMAL

## 2024-06-18 NOTE — PROGRESS NOTES
CC: s/p PPV left eye for floaters 6/20/2022    Interval: doing well. One year follow up. No bone fractures in the past year.    HPI: Nadira Perez 72 year old with hx of visually significantly floaters    OCT 6/18/24  Right eye PHF attached; normal Foveal contour, few small drusen  Left eye stable ERM; normal foveal contour    OCT RNFL 6/18/24  Normal thickness both eyes  Slight progression vs fluctuation in thinning compared to one year ago    ASSESSMENT & PLAN    # S/p PPV+EL left eye  on 6/20/22 for floaters  - doing great: observe    # Large cup:disc ratio w/ possible temporal thinning OS  -  IOP 18/18  - pachy 598/537  - OCT RNFL within normal limits 6/18/24    # ERM left eye   - extrafoveal; stable; observe    #pseudophakia each eye  Observe; new MRx given    #History of RK, OU      Follow up:  - 12 months for V/T/D and OCT RNFL     Brina Dewitt MD  PGY3 Ophthalmology    Complete documentation of historical and exam elements from today's encounter can be found in the full encounter summary report (not reduplicated in this progress note). I personally obtained the chief complaint(s) and history of present illness.  I confirmed and edited as necessary the review of systems, past medical/surgical history, family history, social history, and examination findings as documented by others; and I examined the patient myself. I personally reviewed the relevant tests, images, and reports as documented above. I formulated and edited as necessary the assessment and plan and discussed the findings and management plan with the patient and family.    Felix Oliveros MD, PhD

## 2024-06-18 NOTE — NURSING NOTE
Chief Complaints and History of Present Illnesses   Patient presents with    Follow Up     # S/p PPV25+EL left eye  on 6/20/22 for floaters  # Large cup:disc ratio w/ possible temporal thinning OS     Chief Complaint(s) and History of Present Illness(es)       Follow Up              Associated symptoms: dryness and floaters.  Negative for eye pain and flashes    Pain scale: 0/10    Comments: # S/p PPV25+EL left eye  on 6/20/22 for floaters  # Large cup:disc ratio w/ possible temporal thinning OS              Comments    Pt reports her vision has gotten worse and feels like she needs a new prescription. She felt like her bifocals were off as she has to lift them in order to see better while driving. She notes a floater in her left eye that she first noticed a month ago.     She has dryness in her eyes but notes she's on 2 diuretics.     Lorena Valle OA 2:46 PM June 18, 2024

## 2024-06-22 NOTE — TELEPHONE ENCOUNTER
tiZANidine (ZANAFLEX) 4 MG tablet 60 tablet 1 2/23/2024 -- No   Sig - Route: Take 1 tablet (4 mg) by mouth at bedtime -     ----------------------  Last Office Visit : 3/7/24  Future Office visit:  0  ----------------------      Routing refill request to provider for review/approval because:  Medication not on protocol.

## 2024-06-27 ENCOUNTER — TELEPHONE (OUTPATIENT)
Dept: CARDIOLOGY | Facility: CLINIC | Age: 74
End: 2024-06-27
Payer: COMMERCIAL

## 2024-06-27 NOTE — TELEPHONE ENCOUNTER
Left Voicemail (1st Attempt) for the patient to call back and schedule the following:    Appointment type:  rtn ep   Provider: juanita  Return date: 09/13/24  Specialty phone number: [phone] opt 1   Additional appointment(s) needed: n/a   Additonal Notes: n/a

## 2024-06-28 ENCOUNTER — THERAPY VISIT (OUTPATIENT)
Dept: PHYSICAL THERAPY | Facility: CLINIC | Age: 74
End: 2024-06-28
Payer: COMMERCIAL

## 2024-06-28 DIAGNOSIS — Z91.81 H/O FALL: ICD-10-CM

## 2024-06-28 DIAGNOSIS — I89.0 LYMPHEDEMA OF BOTH LOWER EXTREMITIES: ICD-10-CM

## 2024-06-28 DIAGNOSIS — R26.89 IMPAIRMENT OF BALANCE: ICD-10-CM

## 2024-06-28 DIAGNOSIS — R53.81 PHYSICAL DECONDITIONING: ICD-10-CM

## 2024-06-28 DIAGNOSIS — I89.0 LYMPHEDEMA OF LEFT UPPER EXTREMITY: Primary | ICD-10-CM

## 2024-06-28 DIAGNOSIS — R53.1 DECREASED STRENGTH: ICD-10-CM

## 2024-06-28 DIAGNOSIS — L90.5 SCAR CONDITION AND FIBROSIS OF SKIN: ICD-10-CM

## 2024-06-28 PROCEDURE — 97112 NEUROMUSCULAR REEDUCATION: CPT | Mod: GP | Performed by: PHYSICAL THERAPIST

## 2024-06-28 PROCEDURE — 97140 MANUAL THERAPY 1/> REGIONS: CPT | Mod: GP | Performed by: PHYSICAL THERAPIST

## 2024-06-28 PROCEDURE — 97110 THERAPEUTIC EXERCISES: CPT | Mod: GP | Performed by: PHYSICAL THERAPIST

## 2024-06-28 NOTE — PROGRESS NOTES
06/28/24 0500   Appointment Info   Signing clinician's name / credentials SHEN Davey CLT-LANA   Total/Authorized Visits [facility] Medicare Advantage ; certification   Visits Used 12   Medical Diagnosis lymphedema   PT Tx Diagnosis lymphedema, fibrosis, pain, impaired balance, decreased strength   Precautions/Limitations osteoporosis; h/o multiple falls and fractures   Other pertinent information Anemia; Arthritis; Bladder or bowel problems; Cancer; Dizziness; Fibromyalgia; Foot drop; Hearing problems; Heart problems; Hepatitis; High blood pressure; History of fractures; Menopause; Osteoarthritis; Osteoporosis; Overweight; Pain at night or rest; Progressive neurological deficits; Radiation treatment; Severe dizziness; Thyroid problems; Vision problems  pt stating exacerbation of L UE lyphema due to pain of wrists, falls with fracture and inability to transition to planned garments and bandaging 1x/week.   Progress Note/Certification   Start of Care Date 12/05/23   Onset of illness/injury or Date of Surgery 11/02/23  (date of order Ashley Perry PA-C; Survivorship clinic)   Therapy Frequency 2x/month x 3 months   Predicted Duration 90 days   Certification date from 05/10/24   Certification date to 08/07/24   Progress Note Completed Date 05/10/24   PT Goal 1   Goal Identifier Home program   Goal Description Patient will be independent in home program to manage conditions of lymphedema and fibrosis, impaired balance.   Rationale to maximize safety and independence with performance of ADLs and functional tasks;to maximize safety and independence within the home;to maximize safety and independence within the community;to maximize safety and independence with transportation;to maximize safety and independence with self cares   Goal Progress progressing   Target Date 09/25/24   PT Goal 2   Goal Identifier UE Edema / Volume   Goal Description Patient will report decreased volume of L  UE  by 5% and demonstrate stable  "volumes to decrease risk of infection.   Rationale to maximize safety and independence with performance of ADLs and functional tasks;to maximize safety and independence within the home;to maximize safety and independence within the community;to maximize safety and independence with transportation;to maximize safety and independence with self cares   Goal Progress MET:  decreased 13.3% L UE   Target Date 05/10/24  (goal date extended as just initated intensive CLT)   Date Met 05/08/24   PT Goal 3   Goal Identifier LLIS   Goal Description Patient will demonstrate an 5 point decrease in LLIS to demonstrate a decreased impact of lymphedema on function.   Rationale to maximize safety and independence with self cares   Target Date 09/25/24   Subjective Report   Subjective Report to dept in comrpession sleeve reporting overall \"ok\" status of   Objective Measure 1   Objective Measure UE edema   Details nonpitting puffy edema thorughotu L UE with minimal involvement of L hand; demonstrating rubbery edema along proximal ulna  and demonstrating visible increased edema of medial proximal ulna and distal humerus today   Objective Measure 2   Objective Measure LE edema  / circumferences   Details 2+ distal pretibial pitting  / L LE SA= 25cm; calf = 42cm   Objective Measure 3   Objective Measure volume UE to 40cm   Details L = 2330ml; R = 2063ml; L>R = 13% but measured following use of sleeve x 5 days, last GCB 5 days ago.   Objective Measure 4   Objective Measure STS   Details 0; requires UE support   Objective Measure 5   Objective Measure SLS balance   Details B = 0   Objective Measure 6   Objective Measure LLIS   Therapeutic Procedure/Exercise   Therapeutic Procedures: strength, endurance, ROM, flexibility minutes (88425) 15   Ther Proc 1 strengthening, stretching, aerobic   Ther Proc 1 - Details standing HR and seated TR (without AFO),, elevated STS, GS stretch, split stance hip hikes, core(bridging, SLR, CS); standing tband " rows (horizontal and straight) and option for isometric B shoulder extension,  3-4x/week fitness center;  walking to mailbox with SEC x 30' 4-6days/week  Nustep 2x/week x 30'/ Pilates 1-2x/week   Skilled Intervention providing additional education in prioritizing of exercises now that she is demonstrating increased consistency with short walks (to mailbox and back) using SEC for added balance challenge and attending fitness center using Nustep x 30' x 2days/week, educating in option to slowly trial increasing frequency of Nustep with goal of 150'/week of moderate intensity effort for optimal aerobic fitness. pt also reporting attendance of Pilates class 1-2x/week with emphasis on core as well as on all major mm groups and educating pt in goal of performing strengthening of all major mm groups 2x/week for overall health and current osteoporosis managment.   Patient Response/Progress verbalized understanding   Neuromuscular Re-education   Neuromuscular re-ed of mvmt, balance, coord, kinesthetic sense, posture, proprioception minutes (96817) 10   Neuro Re-ed 1 balance drills, falls prevention   Skilled Intervention no falls since last appointment; performing split stance with HTs most days,  further instructed pt in optimizing technique and avoiding true vertigo. option to progress to increasing dynamic drill of walking with HTs (using 4ww).   Patient Response/Progress verbalized understanding   Manual Therapy   Manual Therapy: Mobilization, MFR, MLD, friction massage minutes (64805) 30   Manual Therapy 1 - Details volume, garments, GCB, MLD, compression pump,  home program   Skilled Intervention pt stating she is waiting for new custom comrpession knee highs as readymade stockings did not work well for controlling edema and increased challenge donning/doffing. . Holding on velro wraps to see how socks work. trial velfoam pad to distal anterior upper arm and instructing pt to continue L UE GCB 1x/week  volume assessed  with details in objective measrues demosntraitng mild increase but also noting not in GCB.  further instruction in self lymphatic draiange before bed as pt has not yet resumed use of pump and educating pt in option for home progrtam   Education   Learner/Method Patient   Plan   Home program standing GS stretch, stanidng HR/ seated TR, STS x 5 x 2 sets, walking with 4ww/ SEC to mailbox most days; Nustep progressing to 150'/week; Pilates class 1-2x/week; UE / LE edema exs daily, positioning to decrease edema, GCB L UE x 24hrs 1x/week; wear compresression knee highs daily once receive custom socks; split stance hip hikes, theraband UE rows or isoetric B shoulder extension, conre(Pilates, bridging, SLR, CS), balance (split stance or mtandem stance)   Updates to plan of care goal 2 met progressing with all other goals   Plan for next session plan to see mid September to further assess updates / modifications to Home program and assess new custom LE compression knee highs as well as assessing self GCB adn volume of L UE following 24hr GCB   Comments   Comments (x2) 4/2024; size large class 1 compression sleeve and gauntle and compression knee highs up to 20-30mhg; purchased AW cotton casual compression knee highs 20-30mmhg but does not feel as strong; did purchase custom compression knee highs and will be mailed to home.   Total Session Time   Timed Code Treatment Minutes 55   Total Treatment Time (sum of timed and untimed services) 55         [facility]                                                                                   OUTPATIENT PHYSICAL THERAPY    PLAN OF TREATMENT FOR OUTPATIENT REHABILITATION   Patient's Last Name, First Name, M.I.  PerezNadira alvarez YOB: 1950   Provider's Name   [facility]   Medical Record No.  [id number]     Onset Date: 11/02/23 (date of order Ashley Perry PA-C; Survivorship clinic)  Start of Care Date:  12/05/23     Medical Diagnosis:  lymphedema      PT Treatment Diagnosis:  lymphedema, fibrosis, pain, impaired balance, decreased strength Plan of Treatment  Frequency/Duration: 1x in 90 days    Certification date from 6/28/2024-9/25/2024       See note for plan of treatment details and functional goals     Jennifer Jauregui, PT                         I CERTIFY THE NEED FOR THESE SERVICES FURNISHED UNDER        THIS PLAN OF TREATMENT AND WHILE UNDER MY CARE     (Physician attestation of this document indicates review and certification of the therapy plan).              Referring Provider:  Ashley Perry    Initial Assessment  See Epic Evaluation- Start of Care Date: 12/05/23            PLAN  Continue therapy per current plan of care.    Beginning/End Dates of Progress Note Reporting Period:  05/10/24 to 06/28/2024    Referring Provider:  Ashley Perry

## 2024-07-01 DIAGNOSIS — I10 BENIGN ESSENTIAL HYPERTENSION: ICD-10-CM

## 2024-07-01 NOTE — TELEPHONE ENCOUNTER
Left Voicemail (2nd Attempt) for the patient to call back and schedule the following:    Appointment type:  rtn ep   Provider: juanita  Return date: 09/13/24  Specialty phone number: [phone] opt 1   Additional appointment(s) needed: n/a   Additonal Notes: n/a

## 2024-07-05 ENCOUNTER — TELEPHONE (OUTPATIENT)
Dept: ORTHOPEDICS | Facility: CLINIC | Age: 74
End: 2024-07-05
Payer: COMMERCIAL

## 2024-07-05 DIAGNOSIS — M81.0 AGE-RELATED OSTEOPOROSIS WITHOUT CURRENT PATHOLOGICAL FRACTURE: Primary | ICD-10-CM

## 2024-07-08 ENCOUNTER — VIRTUAL VISIT (OUTPATIENT)
Dept: CARDIOLOGY | Facility: CLINIC | Age: 74
End: 2024-07-08
Attending: INTERNAL MEDICINE
Payer: COMMERCIAL

## 2024-07-08 VITALS
HEIGHT: 65 IN | SYSTOLIC BLOOD PRESSURE: 133 MMHG | DIASTOLIC BLOOD PRESSURE: 87 MMHG | WEIGHT: 182.9 LBS | HEART RATE: 75 BPM | BODY MASS INDEX: 30.47 KG/M2

## 2024-07-08 DIAGNOSIS — Z85.3 HX: BREAST CANCER: ICD-10-CM

## 2024-07-08 DIAGNOSIS — I10 BENIGN ESSENTIAL HYPERTENSION: Primary | ICD-10-CM

## 2024-07-08 DIAGNOSIS — I42.9 SECONDARY CARDIOMYOPATHY (H): ICD-10-CM

## 2024-07-08 PROCEDURE — 99213 OFFICE O/P EST LOW 20 MIN: CPT | Mod: 95 | Performed by: INTERNAL MEDICINE

## 2024-07-08 RX ORDER — CARVEDILOL 12.5 MG/1
12.5 TABLET ORAL 2 TIMES DAILY WITH MEALS
Qty: 180 TABLET | Refills: 3 | Status: SHIPPED | OUTPATIENT
Start: 2024-07-08

## 2024-07-08 ASSESSMENT — PAIN SCALES - GENERAL: PAINLEVEL: MILD PAIN (3)

## 2024-07-08 NOTE — LETTER
7/8/2024      RE: Nadira Perez  [address]  [place] [address]       Dear Colleague,    Thank you for the opportunity to participate in the care of your patient, Nadira Perez, at the [facility] [place] [facility]. Please see a copy of my visit note below.    HISTORY:    Ms. 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  Treated with neoadjuvant chemo (anthracycline), bilateral mastectomy (residual disease), radiation, and 14 years of endocrine therapy.   2. PVCs  3. History of stress cardiomyopathy with LVEF down to 35% on the day of mechanical fall 2/16/2024, now improved to LVEF 50 to 55%.  4. Hypertension  5. Dyslipidemia    Her peripheral edema is less after reducing the amlodipine dose. SBP at home 140-150 mmHg, HR 70s and no symptoms. She denies chest pain, dyspnea at rest, palpitations or syncope.       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     Corneal opacity     Hypothyroidism     Osteopenia, unspecified location     Aromatase inhibitor use     Chronic pain of right knee     DDD (degenerative disc disease), lumbar     Degenerative scoliosis in adult patient     Carpal tunnel syndrome of right wrist     Peripheral neuropathy     Spinal stenosis of  lumbar region with neurogenic claudication     Spondylolisthesis of lumbar region     Brain lesion     Dermatochalasis of both upper eyelids     S/P spinal fusion     Chronic bilateral low back pain     PVD (posterior vitreous detachment), left eye     Vitreous opacities of left eye     Closed nondisplaced fracture of lateral malleolus of left fibula, initial encounter     Acute left ankle pain     Sacroiliitis (H24)     Lumbar radiculopathy     Lumbosacral radiculopathy     Diabetes mellitus, type 2 (H)     Abnormal electrocardiogram     Ejection fraction < 50%     Closed fracture of sternum, unspecified portion of sternum, initial encounter     Congenital abnormality of ear        FAMILY HISTORY:  Family History   Problem Relation Age of Onset     Neurologic Disorder Mother         Anuerysm of Cerebral Artery, Dementia     Diabetes Mother      Thyroid Disease Mother         Hyperthyroidism (goiter)     Cerebrovascular Disease Mother         Hemorrhagic     Dementia Mother      Osteoporosis Mother      Hyperlipidemia Mother      Heart Disease Father         AAA     Hypertension Father      Coronary Artery Disease Father      Hyperlipidemia Father      Mental Illness Father         Schizophrenia?     Dementia Brother         hydrocephalis     Circulatory Brother         Perihperal Neurophathy     Diabetes Maternal Grandmother         Presumed Type 2     Asthma Maternal Grandmother      Chronic Obstructive Pulmonary Disease Maternal Grandfather         father     Asthma Maternal Grandfather      Diabetes Maternal Aunt         x2     Melanoma Maternal Aunt      Glaucoma Maternal Aunt      Breast Cancer Cousin      Breast Cancer Cousin      Dementia Other      Cancer Other         malignant melanoma     Hypertension Other      Hypertension Other      Cerebrovascular Disease Other      Cerebrovascular Disease Other      Obesity Other      Respiratory Other      Cancer Other      Diabetes Other      Asthma Other       Other Cancer Other         Malignant melanoma     Obesity Other      Macular Degeneration No family hx of      Kidney Disease No family hx of      Thrombosis No family hx of      Arthritis No family hx of      Depression No family hx of      Substance Abuse No family hx of      Cystic Fibrosis No family hx of      Early Death No family hx of      Coronary Artery Disease Early Onset No family hx of      Heart Failure No family hx of      Bleeding Diathesis No family hx of      Ovarian Cancer No family hx of      Uterine Cancer No family hx of      Prostate Cancer No family hx of      Colorectal Cancer No family hx of      Pancreatic Cancer No family hx of      Lung Cancer No family hx of      Autoimmune Disease No family hx of      Unknown/Adopted No family hx of      Genetic Disorder No family hx of      Bleeding Disorder No family hx of      Clotting Disorder No family hx of      Anesthesia Reaction No family hx of        SOCIAL HISTORY:  Social History     Tobacco Use     Smoking status: Never     Passive exposure: Never     Smokeless tobacco: Never   Vaping Use     Vaping status: Never Used   Substance Use Topics     Alcohol use: Yes     Alcohol/week: 7.0 standard drinks of alcohol     Types: 7 Glasses of wine per week     Comment: Rare if ever     Drug use: Yes     Types: Marijuana     Comment: Smoke through water filter for chronic back pain PRN.        CURRENT MEDICATIONS:  Current Outpatient Medications   Medication Sig Dispense Refill     acetaminophen (TYLENOL) 500 MG tablet Take 1,000 mg by mouth 3 times daily       amLODIPine (NORVASC) 2.5 MG tablet Take 1 tablet (2.5 mg) by mouth daily 90 tablet 1     artificial tears OINT ophthalmic ointment at bedtime       Ascorbic Acid (VITAMIN C) 500 MG CHEW Take 1 tablet by mouth daily       atorvastatin (LIPITOR) 80 MG tablet Take 1 tablet (80 mg) by mouth daily 90 tablet 3     carvedilol (COREG) 6.25 MG tablet Take 1 tablet (6.25 mg) by mouth 2 times daily (with  meals) 180 tablet 1     Cholecalciferol (VITAMIN D3) 50 MCG (2000 UT) CAPS TAKE 3 CAPSULES (6,000 UNITS) BY MOUTH DAILY. (Patient taking differently: Take 4,000 Units by mouth daily) 270 capsule 3     CRANBERRY EXTRACT PO Take 500 mg by mouth daily       diclofenac (VOLTAREN) 1 % topical gel Apply 2 g topically 4 times daily 100 g 6     gabapentin (NEURONTIN) 300 MG capsule Take 4 capsules (1,200 mg) by mouth 3 times daily 1080 capsule 3     HEMP OIL OR EXTRACT OR OTHER CBD CANNABINOID, NOT MEDICAL CANNABIS, THC       hydroCHLOROthiazide (HYDRODIURIL) 25 MG tablet Take 1 tablet (25 mg) by mouth daily 30 tablet 3     levothyroxine (SYNTHROID/LEVOTHROID) 112 MCG tablet TAKE 1/2 TAB BY MOUTH EVERY DAY 45 tablet 3     lisinopril (ZESTRIL) 20 MG tablet Take 1 tablet (20 mg) by mouth 2 times daily 180 tablet 0     magnesium 250 MG tablet Take 400 mg by mouth every evening       Melatonin 10 MG TABS tablet Take 10 mg by mouth at bedtime       Multiple Vitamin (MULTI-VITAMIN) per tablet Take 1 tablet by mouth daily       ondansetron (ZOFRAN-ODT) 4 MG ODT tab Take 1 tablet (4 mg) by mouth every 12 hours as needed for nausea 180 tablet 3     spironolactone (ALDACTONE) 25 MG tablet Take 25 mg by mouth daily       tiZANidine (ZANAFLEX) 4 MG tablet Take 1 tablet (4 mg) by mouth at bedtime 90 tablet 1       Exam: limited due to the nature of this visit   In general, the patient is in no acute distress.    Breathing is unlabored.     I have independently reviewed this patient's relevant laboratory and cardiac data :    Recent Labs   Lab Test 03/07/24  1451 04/15/22  1632   CHOL 214* 167   HDL 56 67   * 71   TRIG 237* 145      Recent Labs   Lab Test 11/30/23  1218 10/27/22  1428   AST 25 22     Recent Labs   Lab Test 11/30/23  1218 10/27/22  1428   ALT 21 16     Recent Labs   Lab Test 05/03/24  1143 04/05/24  1159    141   POTASSIUM 3.8 4.0   CHLORIDE 101 103   CO2 26 24   ANIONGAP 12 14   BUN 24.1* 33.4*   CR 0.89  0.90     Recent Labs   Lab Test 02/17/24  0640 02/16/24  1005   WBC 10.3 9.8   RBC 4.08 4.23   HGB 11.9 12.3   MCV 91 93    306     Recent Labs   Lab Test 03/07/24  1451 11/30/23  1218   A1C 6.2* 6.5*     Recent Labs   Lab Test 12/04/23  1642 11/30/23  1218   TSH 2.03 2.07     ECG June 2024 - sinus rhyhtm    CMR: 4/11/24  CONCLUSIONS:   1. Mildly reduced LV systolic function with LVEF of 50%.  2. Late gadolinium enhancement imaging shows focal area of mid inferolateral wall subendocardial delayed enhancement that could represent a small size infarct.    TTE: 4/2/24  Interpretation Summary  Left ventricular function is decreased. The ejection fraction is 50-55%  (borderline). Mid inferolateral akinesis is present.  Global right ventricular function is normal.  No pericardial effusion is present.    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 LCx infarction     Overall, she is doing well without angina or heart failure. She has findings of a small inferolateral infarction but since she is not symptomatic, coronary CTA/invasive angiogram was deferred. She is on a statin and is on antihypertensives. Due to her significant lower extremity edema and the gingival hyperplasia, the amlodipine dose was reduced. Review of her recent home BP readings would allow up-titration of beta blockers.     Patient is at an acceptable risk for the upcoming minor ear surgery as long her blood pressure is at goal SBP <140 mmHg.     Recommendations are -     Increase carvedilol 12.5 mg bid   Continue Lisinopril 20 mg bid  Continue amlodipine 2.5 mg  daily  Continue Hydrochlorothiazide 25mg daily   Continue Atorvastatin 80mg daily   Continue spironolactone 25mg daily   Follow up with Dr Rascon in 3 months    Cinthia Rascon MD, MS  Professor of Medicine  Cardiovascular Medicine

## 2024-07-08 NOTE — PATIENT INSTRUCTIONS
You were seen in the Cardiology Clinic today by: Dr. Rascon    Plan:   Medication Changes:   INCREASE Carvedilol dose to 12.5 mg twice daily.     Follow up Visit:  With Dr. Rascon in 3 months or sooner if needed.     Further Instructions:  Please send us updated blood pressures in 7 - 10 days. If blood pressures are low (systolic blood pressure less then 110) please let us know we will consider stopping your Amlodipine dose.     If you have further questions, please utilize [employer] to contact us.     Your Care Team:    Cardiology   Telephone Number     Nurse Line  Jose Lo RN    [phone]     For scheduling appointments:  [phone]           On-call cardiologist for after hours or on weekends:   [phone], option #4, and ask to speak to the on-call cardiologist.     Cardiovascular Clinic:   [address] [place] [address]. [place] [address]      As always, Thank you for trusting us with your health care needs!

## 2024-07-08 NOTE — PROGRESS NOTES
"Virtual Visit Details    Type of service:  Video Visit   Video Start Time: {video visit start/end time for provider to select:354407}  Video End Time:{video visit start/end time for provider to select:841708}    Originating Location (pt. Location): {video visit patient location:773850::\"Home\"}  {PROVIDER LOCATION On-site should be selected for visits conducted from your clinic location or adjoining [facility] hospital, academic office, or other nearby [facility] building. Off-site should be selected for all other provider locations, including home:398245}  Distant Location (provider location):  {virtual location provider:108553}  Platform used for Video Visit: {Virtual Visit Platforms:506676::\"[employer]\"}  "

## 2024-07-08 NOTE — PROGRESS NOTES
HISTORY:    Ms. Nadira Perez is a pleasant 73 year old female with a medical history notable for    Left-sided stage IIIC inflammatory breast cancer, ER/[place] positive, HER2 negative, diagnosed in 2007.  Treated with neoadjuvant chemo (anthracycline), bilateral mastectomy (residual disease), radiation, and 14 years of endocrine therapy.   2. PVCs  3. History of stress cardiomyopathy with LVEF down to 35% on the day of mechanical fall 2/16/2024, now improved to LVEF 50 to 55%.  4. Hypertension  5. Dyslipidemia    Her peripheral edema is less after reducing the amlodipine dose. SBP at home 140-150 mmHg, HR 70s and no symptoms. She denies chest pain, dyspnea at rest, palpitations or syncope.       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    Corneal opacity    Hypothyroidism    Osteopenia, unspecified location    Aromatase inhibitor use    Chronic pain of right knee    DDD (degenerative disc disease), lumbar    Degenerative scoliosis in adult patient    Carpal tunnel syndrome of right wrist    Peripheral neuropathy    Spinal stenosis of lumbar region with neurogenic claudication    Spondylolisthesis of lumbar region    Brain lesion    Dermatochalasis of both upper eyelids    S/P spinal fusion    Chronic bilateral low back pain    PVD (posterior vitreous detachment), left eye    Vitreous opacities of left eye    Closed nondisplaced fracture of lateral malleolus of left fibula, initial encounter    Acute left  ankle pain    Sacroiliitis (H24)    Lumbar radiculopathy    Lumbosacral radiculopathy    Diabetes mellitus, type 2 (H)    Abnormal electrocardiogram    Ejection fraction < 50%    Closed fracture of sternum, unspecified portion of sternum, initial encounter    Congenital abnormality of ear        FAMILY HISTORY:  Family History   Problem Relation Age of Onset    Neurologic Disorder Mother         Anuerysm of Cerebral Artery, Dementia    Diabetes Mother     Thyroid Disease Mother         Hyperthyroidism (goiter)    Cerebrovascular Disease Mother         Hemorrhagic    Dementia Mother     Osteoporosis Mother     Hyperlipidemia Mother     Heart Disease Father         AAA    Hypertension Father     Coronary Artery Disease Father     Hyperlipidemia Father     Mental Illness Father         Schizophrenia?    Dementia Brother         hydrocephalis    Circulatory Brother         Perihperal Neurophathy    Diabetes Maternal Grandmother         Presumed Type 2    Asthma Maternal Grandmother     Chronic Obstructive Pulmonary Disease Maternal Grandfather         father    Asthma Maternal Grandfather     Diabetes Maternal Aunt         x2    Melanoma Maternal Aunt     Glaucoma Maternal Aunt     Breast Cancer Cousin     Breast Cancer Cousin     Dementia Other     Cancer Other         malignant melanoma    Hypertension Other     Hypertension Other     Cerebrovascular Disease Other     Cerebrovascular Disease Other     Obesity Other     Respiratory Other     Cancer Other     Diabetes Other     Asthma Other     Other Cancer Other         Malignant melanoma    Obesity Other     Macular Degeneration No family hx of     Kidney Disease No family hx of     Thrombosis No family hx of     Arthritis No family hx of     Depression No family hx of     Substance Abuse No family hx of     Cystic Fibrosis No family hx of     Early Death No family hx of     Coronary Artery Disease Early Onset No family hx of     Heart Failure No family hx of      Bleeding Diathesis No family hx of     Ovarian Cancer No family hx of     Uterine Cancer No family hx of     Prostate Cancer No family hx of     Colorectal Cancer No family hx of     Pancreatic Cancer No family hx of     Lung Cancer No family hx of     Autoimmune Disease No family hx of     Unknown/Adopted No family hx of     Genetic Disorder No family hx of     Bleeding Disorder No family hx of     Clotting Disorder No family hx of     Anesthesia Reaction No family hx of        SOCIAL HISTORY:  Social History     Tobacco Use    Smoking status: Never     Passive exposure: Never    Smokeless tobacco: Never   Vaping Use    Vaping status: Never Used   Substance Use Topics    Alcohol use: Yes     Alcohol/week: 7.0 standard drinks of alcohol     Types: 7 Glasses of wine per week     Comment: Rare if ever    Drug use: Yes     Types: Marijuana     Comment: Smoke through water filter for chronic back pain PRN.        CURRENT MEDICATIONS:  Current Outpatient Medications   Medication Sig Dispense Refill    acetaminophen (TYLENOL) 500 MG tablet Take 1,000 mg by mouth 3 times daily      amLODIPine (NORVASC) 2.5 MG tablet Take 1 tablet (2.5 mg) by mouth daily 90 tablet 1    artificial tears OINT ophthalmic ointment at bedtime      Ascorbic Acid (VITAMIN C) 500 MG CHEW Take 1 tablet by mouth daily      atorvastatin (LIPITOR) 80 MG tablet Take 1 tablet (80 mg) by mouth daily 90 tablet 3    carvedilol (COREG) 6.25 MG tablet Take 1 tablet (6.25 mg) by mouth 2 times daily (with meals) 180 tablet 1    Cholecalciferol (VITAMIN D3) 50 MCG (2000 UT) CAPS TAKE 3 CAPSULES (6,000 UNITS) BY MOUTH DAILY. (Patient taking differently: Take 4,000 Units by mouth daily) 270 capsule 3    CRANBERRY EXTRACT PO Take 500 mg by mouth daily      diclofenac (VOLTAREN) 1 % topical gel Apply 2 g topically 4 times daily 100 g 6    gabapentin (NEURONTIN) 300 MG capsule Take 4 capsules (1,200 mg) by mouth 3 times daily 1080 capsule 3    HEMP OIL OR EXTRACT OR  OTHER CBD CANNABINOID, NOT MEDICAL CANNABIS, THC      hydroCHLOROthiazide (HYDRODIURIL) 25 MG tablet Take 1 tablet (25 mg) by mouth daily 30 tablet 3    levothyroxine (SYNTHROID/LEVOTHROID) 112 MCG tablet TAKE 1/2 TAB BY MOUTH EVERY DAY 45 tablet 3    lisinopril (ZESTRIL) 20 MG tablet Take 1 tablet (20 mg) by mouth 2 times daily 180 tablet 0    magnesium 250 MG tablet Take 400 mg by mouth every evening      Melatonin 10 MG TABS tablet Take 10 mg by mouth at bedtime      Multiple Vitamin (MULTI-VITAMIN) per tablet Take 1 tablet by mouth daily      ondansetron (ZOFRAN-ODT) 4 MG ODT tab Take 1 tablet (4 mg) by mouth every 12 hours as needed for nausea 180 tablet 3    spironolactone (ALDACTONE) 25 MG tablet Take 25 mg by mouth daily      tiZANidine (ZANAFLEX) 4 MG tablet Take 1 tablet (4 mg) by mouth at bedtime 90 tablet 1       Exam: limited due to the nature of this visit   In general, the patient is in no acute distress.    Breathing is unlabored.     I have independently reviewed this patient's relevant laboratory and cardiac data :    Recent Labs   Lab Test 03/07/24  1451 04/15/22  1632   CHOL 214* 167   HDL 56 67   * 71   TRIG 237* 145      Recent Labs   Lab Test 11/30/23  1218 10/27/22  1428   AST 25 22     Recent Labs   Lab Test 11/30/23  1218 10/27/22  1428   ALT 21 16     Recent Labs   Lab Test 05/03/24  1143 04/05/24  1159    141   POTASSIUM 3.8 4.0   CHLORIDE 101 103   CO2 26 24   ANIONGAP 12 14   BUN 24.1* 33.4*   CR 0.89 0.90     Recent Labs   Lab Test 02/17/24  0640 02/16/24  1005   WBC 10.3 9.8   RBC 4.08 4.23   HGB 11.9 12.3   MCV 91 93    306     Recent Labs   Lab Test 03/07/24  1451 11/30/23  1218   A1C 6.2* 6.5*     Recent Labs   Lab Test 12/04/23  1642 11/30/23  1218   TSH 2.03 2.07     ECG June 2024 - sinus rhyhtm    CMR: 4/11/24  CONCLUSIONS:   1. Mildly reduced LV systolic function with LVEF of 50%.  2. Late gadolinium enhancement imaging shows focal area of mid inferolateral  wall subendocardial delayed enhancement that could represent a small size infarct.    TTE: 4/2/24  Interpretation Summary  Left ventricular function is decreased. The ejection fraction is 50-55%  (borderline). Mid inferolateral akinesis is present.  Global right ventricular function is normal.  No pericardial effusion is present.    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 LCx infarction     Overall, she is doing well without angina or heart failure. She has findings of a small inferolateral infarction but since she is not symptomatic, coronary CTA/invasive angiogram was deferred. She is on a statin and is on antihypertensives. Due to her significant lower extremity edema and the gingival hyperplasia, the amlodipine dose was reduced. Review of her recent home BP readings would allow up-titration of beta blockers.     Patient is at an acceptable risk for the upcoming minor ear surgery as long her blood pressure is at goal SBP <140 mmHg.     Recommendations are -     Increase carvedilol 12.5 mg bid   Continue Lisinopril 20 mg bid  Continue amlodipine 2.5 mg daily  Continue Hydrochlorothiazide 25mg daily   Continue Atorvastatin 80mg daily   Continue spironolactone 25mg daily   Follow up with Dr Rascon in 3 months    Cinthia Rascon MD, MS  Professor of Medicine  Cardiovascular Medicine

## 2024-07-08 NOTE — PROGRESS NOTES
"Virtual Visit Details    Type of service:  Video Visit   Video Start Time: {video visit start/end time for provider to select:716030}  Video End Time:{video visit start/end time for provider to select:499064}    Originating Location (pt. Location): {video visit patient location:461282::\"Home\"}  {PROVIDER LOCATION On-site should be selected for visits conducted from your clinic location or adjoining [facility] hospital, academic office, or other nearby [facility] building. Off-site should be selected for all other provider locations, including home:[id number]}  Distant Location (provider location):  {virtual location provider:477689}  Platform used for Video Visit: {Virtual Visit Platforms:371667::\"RainKing\"}    "

## 2024-07-08 NOTE — NURSING NOTE
Current patient location: [address] [facility] [id number]    Is the patient currently in the state of [place]? YES    Visit mode:VIDEO    If the visit is dropped, the patient can be reconnected by: VIDEO VISIT: Send to e-mail at: [email or web address]    Will anyone else be joining the visit? NO  (If patient encounters technical issues they should call [phone])    How would you like to obtain your AVS? MyChart    Are changes needed to the allergy or medication list? Pt declined med review and pt reviewed medication while completing the echeck-in.     Are refills needed on medications prescribed by this physician? NO    Reason for visit: LEILA HOPE

## 2024-07-09 ENCOUNTER — ANESTHESIA EVENT (OUTPATIENT)
Dept: SURGERY | Facility: AMBULATORY SURGERY CENTER | Age: 74
End: 2024-07-09
Payer: COMMERCIAL

## 2024-07-09 ENCOUNTER — OFFICE VISIT (OUTPATIENT)
Dept: SURGERY | Facility: CLINIC | Age: 74
End: 2024-07-09
Payer: COMMERCIAL

## 2024-07-09 ENCOUNTER — LAB (OUTPATIENT)
Dept: LAB | Facility: CLINIC | Age: 74
End: 2024-07-09
Payer: COMMERCIAL

## 2024-07-09 ENCOUNTER — PRE VISIT (OUTPATIENT)
Dept: SURGERY | Facility: CLINIC | Age: 74
End: 2024-07-09

## 2024-07-09 VITALS
HEART RATE: 72 BPM | HEIGHT: 65 IN | SYSTOLIC BLOOD PRESSURE: 119 MMHG | RESPIRATION RATE: 16 BRPM | OXYGEN SATURATION: 97 % | TEMPERATURE: 99.8 F | WEIGHT: 185 LBS | BODY MASS INDEX: 30.82 KG/M2 | DIASTOLIC BLOOD PRESSURE: 78 MMHG

## 2024-07-09 DIAGNOSIS — Z01.818 PRE-OP EVALUATION: Primary | ICD-10-CM

## 2024-07-09 DIAGNOSIS — R73.03 PRE-DIABETES: ICD-10-CM

## 2024-07-09 LAB — HBA1C MFR BLD: 6.2 %

## 2024-07-09 PROCEDURE — 99000 SPECIMEN HANDLING OFFICE-LAB: CPT | Performed by: PATHOLOGY

## 2024-07-09 PROCEDURE — 36415 COLL VENOUS BLD VENIPUNCTURE: CPT | Performed by: PATHOLOGY

## 2024-07-09 PROCEDURE — 99214 OFFICE O/P EST MOD 30 MIN: CPT | Performed by: PHYSICIAN ASSISTANT

## 2024-07-09 PROCEDURE — 83036 HEMOGLOBIN GLYCOSYLATED A1C: CPT | Performed by: PHYSICIAN ASSISTANT

## 2024-07-09 RX ORDER — HYDROCHLOROTHIAZIDE 25 MG/1
25 TABLET ORAL DAILY
Qty: 30 TABLET | Refills: 2 | Status: SHIPPED | OUTPATIENT
Start: 2024-07-09

## 2024-07-09 RX ORDER — VIT C/B6/B5/MAGNESIUM/HERB 173 50-5-6-5MG
500 CAPSULE ORAL EVERY MORNING
COMMUNITY

## 2024-07-09 ASSESSMENT — ENCOUNTER SYMPTOMS: SEIZURES: 0

## 2024-07-09 ASSESSMENT — PAIN SCALES - GENERAL: PAINLEVEL: MODERATE PAIN (4)

## 2024-07-09 ASSESSMENT — LIFESTYLE VARIABLES: TOBACCO_USE: 0

## 2024-07-09 NOTE — PATIENT INSTRUCTIONS
Preparing for Your Surgery      Name:  Nadira Perez   MRN:  [id number]   :  1950   Today's Date:  2024         Arriving for surgery:  Surgery date:  24  Arrival time:  8.00AM    Restrictions due to COVID 19:    Please maintain social distance.  Masking is optional.      parking is available for anyone with mobility limitations or disabilities. (Monday- Friday 7 am- 5 pm)    Please come to:    [facility]  [address] [place] [id number]    Please check in on the 5th floor at the [facility].      What can I eat or drink?    -  You may eat and drink normally until 8 hours prior to arrival  time. (Until 12.00AM)  -  You may have clear liquids until 2 hours prior to arrival  time. (Until 6.00AM)    Examples of clear liquids:  Water  Clear broth  Juices (apple, white grape, white cranberry  and cider) without pulp  Noncarbonated, powder based beverages  (lemonade and Gelacio-Aid)  Sodas (Sprite, 7-Up, ginger ale and seltzer)  Coffee or tea (without milk or cream)  Gatorade      Which medicines can I take?    Hold Aspirin for 7 days before surgery.   Hold Multivitamins for 7 days before surgery.  Hold Supplements for 7 days before surgery. (Cranberry Extract)  Hold Ibuprofen (Advil, Motrin) for 1 day before surgery--unless otherwise directed by surgeon.  Hold Naproxen (Aleve) for 4 days before surgery.    No alcohol or cannabis products for 24 hours prior to procedure.      -  DO NOT take the following medications the day of surgery:  Vitamin C, D3.  Hydrochlorothiazide (Hydrodiuril)  Continue to hold cranberry extract and multivitamin.    Magnesium  Spironolactone (Aldactone)  Voltaren gel      -  PLEASE TAKE the following medications the day of surgery:   Amlodipine (Norvasc)  Tylenol as needed.  Lipitor  Carvedilol (Coreg)  Gabapentin  Levothyroxine (Synthroid)  Lisinopril (Zestril)  Zofran as needed.    How do I prepare myself?  - Please take  2 showers (one the night prior to surgery and one the morning of surgery) using Scrubcare or Hibiclens soap.    Use this soap only from the neck to your toes.     Leave the soap on your skin for one minute--then rinse thoroughly.      You may use your own shampoo and conditioner. No other hair products.   - Please remove all jewelry and body piercings.  - No lotions, deodorants or fragrance.  - No makeup or fingernail polish.   - Bring your ID and insurance card.    -If you have a Deep Brain Stimulator, a Spinal Cord Stimulator, or any implanted Neuro Device, you must bring the remote to the Surgery Center.         ALL PATIENTS ARE REQUIRED TO HAVE A RESPONSIBLE ADULT TO DRIVE AND BE IN ATTENDANCE WITH THEM FOR 24 HOURS FOLLOWING SURGERY.     Covid testing policy as of 12/06/2022  Your surgeon will notify and schedule you for a COVID test if one is needed before surgery--please direct any questions or COVID symptoms to your surgeon      Questions or Concerns:    -For questions regarding the day of surgery, please contact the Ambulatory Surgery Center at [phone].    -If you have health changes between today and your surgery, please contact your surgeon.     - For questions after surgery, please contact your surgeon's office.

## 2024-07-09 NOTE — H&P
Pre-Operative H & P     CC:  Preoperative exam to assess for increased cardiopulmonary risk while undergoing surgery and anesthesia.    Date of Encounter: 7/9/2024  Primary Care Physician:  Matilde Quiñonez     Reason for visit:   Encounter Diagnoses   Name Primary?    Pre-op evaluation Yes    Pre-diabetes        HPI  Nadira Perez is a 74 year old female who presents for pre-operative H & P in preparation for  Procedure Information       Case: [id number] Date/Time: 07/25/24 0930    Procedure: right revision functional otoplasty, possible use of a cartilage graft from the left ear (Bilateral: Ear)    Anesthesia type: MAC with Local    Diagnosis: Congenital abnormality of ear [Q17.9]    Pre-op diagnosis: Congenital abnormality of ear [Q17.9]    Location: [place] [address] / [facility]    Providers: Juan Agudelo MD            Patient is being evaluated for comorbid conditions of h/o breast cancer, diabetes, hypertension, dyslipidemia, h/o blood transfusion, h/o PONV, hypothyroid     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  She is seen by Dr. Agudelo and she is now scheduled for the above procedure.    History is obtained from the patient and chart review    Hx of abnormal bleeding or anti-platelet use: denies    Menstrual history: No LMP recorded (lmp unknown). Patient has had a hysterectomy.     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    Thyroid disease        Past Surgical History  Past Surgical History:   Procedure Laterality Date    ABDOMEN SURGERY      ovarian cyst, mesh    ARTHRODESIS WRIST Right     ARTHRODESIS WRIST  02/14/2013    Procedure: ARTHRODESIS WRIST;  left wrist scaphoid excision, four bone fusion, iliac crest bone graft  ( Mac with block);  Surgeon: Av Mendez MD;  [facility]    BIOPSY      skin, vaginal    BLEPHAROPLASTY BILATERAL Bilateral 05/06/2022    Procedure: UPPER BLEPHAROPLASTY, BILATERAL;  Surgeon: Jemal Sanchez MD;  [place] [facility]    CATARACT IOL, RT/LT Right 03/13/2018    CATARACT IOL, RT/LT Left 02/20/2018    COLONOSCOPY  12/24/2013    Procedure: COMBINED COLONOSCOPY, SINGLE BIOPSY/POLYPECTOMY BY BIOPSY;  COLONOSCOPY;  Surgeon: Dom Alvarez MD;  Location: [facility]    COLONOSCOPY N/A 3/12/2024    Procedure: COLONOSCOPY, FLEXIBLE, WITH LESION REMOVAL USING SNARE;  Surgeon: Amina Espinoza MD;  Location:  GI    COSMETIC BLEPHAROPLASTY LOWER LIDS BILATERAL Bilateral 05/06/2022    Procedure: BLEPHAROPLASTY, LOWER EYELID, BILATERAL, COSMETIC;  Surgeon: Jemal Sanchez MD;  [place] [facility] [place]    COSMETIC SURGERY      ESOPHAGOSCOPY, GASTROSCOPY, DUODENOSCOPY (EGD), COMBINED N/A 11/23/2016    Procedure: COMBINED ESOPHAGOSCOPY, GASTROSCOPY, DUODENOSCOPY (EGD);  Surgeon: Quinten Feliciano MD;  [facility]: [facility]    ESOPHAGOSCOPY, GASTROSCOPY, DUODENOSCOPY (EGD), COMBINED N/A 3/12/2024    Procedure: ESOPHAGOGASTRODUODENOSCOPY, WITH BIOPSY;  Surgeon: Amina Espinoza MD;  Location:  GI    EXTERNAL EAR SURGERY      right    EYE SURGERY      radial keratomy    FUSION, SPINE, INTERBODY, OBLIQUE ANTERIOR AND LUMBAR, 2 LEVELS, POST APPROACH, USING OTS N/A 11/18/2021    Procedure: Part 1: Oblique Anterior Interbody Fusion at Lumbar 5 to sacral 1 with use of Bone Morphogenic  Protein,;  Surgeon: Serge Ramirez MD;  [facility]    GI SURGERY      Umbilical hernia repair    GRAFT BONE FROM ILIAC CREST  02/14/2013    Procedure: GRAFT BONE FROM ILIAC CREST;  mac with block and local infilitration;  Surgeon: Av Mendez MD;  [facility]    HC BREATH HYDROGEN TEST N/A 10/14/2016    Procedure: HYDROGEN BREATH TEST;  Surgeon: Chrei Barron MD;  Location: [facility]    HERNIA REPAIR      umbilical age 18 mos.    HYSTERECTOMY TOTAL ABDOMINAL  05/03/2000    INJECT EPIDURAL CAUDAL N/A 09/12/2023    Procedure: Caudal epidural steroid injection with fluoroscopy;  Surgeon: Natasha Umaña MD;  Location: [facility] OR    INJECT EPIDURAL CAUDAL N/A 1/2/2024    Procedure: INJECTION, EPIDURAL, CAUDAL;  Surgeon: Natasha Umaña MD;  Location: [facility] OR    INJECT EPIDURAL LUMBAR N/A 05/23/2023    Procedure: L3-4 interlaminar epidural steroid injection with fluoroscopy ( left> right);  Surgeon: Natasha Umaña MD;  Location: [facility] OR    INJECT JOINT SACROILIAC Left 04/27/2023    Procedure: Left sacroiliac joint steroid injection with fluoroscopy;  Surgeon: Natasha Umaña MD;  Location: [facility] OR    MASTECTOMY MODIFIED RADICAL Bilateral     bilateral; right breast prophylactic    OPTICAL TRACKING SYSTEM FUSION POSTERIOR SPINE LUMBAR N/A 11/18/2021    Procedure: Open Posterior Instrumented Spinal Fusion at Lumbar 5 to Sacral 1, with grimm aguayo osteotomy, use of O-Arm/Stealth;  Surgeon: Serge Ramirez MD;  [facility]    PARATHYROIDECTOMY  09/23/2004    R SUPERIOR    PARATHYROIDECTOMY  09/23/2004    parathyroid resection, subtotal    RELEASE CARPAL TUNNEL Right 12/02/2021    Procedure: RIGHT CARPAL TUNNEL RELEASE, RIGHT WRIST HARDWARE REMOVAL, RIGHT CARPAL BOSS EXCISION;  Surgeon: Rolan Conley MD;  Location: [facility] OR    REMOVE HARDWARE HAND  09/24/2013    Procedure: REMOVE HARDWARE HAND;  Left Hand Screw Removal       RHINOPLASTY  1968    thyr proc skin closed  cosmetic manner by subcuticular stitch  01/23/2009    THYROPLASTY  10/09/2009    TONSILLECTOMY  1977    VITRECTOMY PARSPLANA WITH 25 GAUGE SYSTEM Left 06/20/2022    Procedure: LEFT EYE VITRECTOMY, PARS PLANA APPROACH, USING 25-GAUGE INSTRUMENTS, laser;  Surgeon: Felix Carlos MD;  Location: [facility] OR    WRIST SURGERY      wrist arthrodesis       Prior to Admission Medications  Current Outpatient Medications   Medication Sig Dispense Refill    acetaminophen (TYLENOL) 500 MG tablet Take 1,000 mg by mouth 3 times daily      amLODIPine (NORVASC) 2.5 MG tablet Take 1 tablet (2.5 mg) by mouth daily 90 tablet 1    artificial tears OINT ophthalmic ointment at bedtime      Ascorbic Acid (VITAMIN C) 500 MG CHEW Take 1 tablet by mouth every morning      atorvastatin (LIPITOR) 80 MG tablet Take 1 tablet (80 mg) by mouth daily (Patient taking differently: Take 80 mg by mouth every evening) 90 tablet 3    carvedilol (COREG) 12.5 MG tablet Take 1 tablet (12.5 mg) by mouth 2 times daily (with meals) 180 tablet 3    Cholecalciferol (VITAMIN D3) 50 MCG (2000 UT) CAPS TAKE 3 CAPSULES (6,000 UNITS) BY MOUTH DAILY. (Patient taking differently: Take 6,000 Units by mouth every morning) 270 capsule 3    CRANBERRY EXTRACT PO Take 500 mg by mouth every morning      diclofenac (VOLTAREN) 1 % topical gel Apply 2 g topically 4 times daily 100 g 6    gabapentin (NEURONTIN) 300 MG capsule Take 4 capsules (1,200 mg) by mouth 3 times daily 1080 capsule 3    HEMP OIL OR EXTRACT OR OTHER CBD CANNABINOID, NOT MEDICAL CANNABIS, THC      hydrochlorothiazide (HYDRODIURIL) 25 MG tablet Take 1 tablet (25 mg) by mouth daily 90 day supply denied (Patient taking differently: Take 25 mg by mouth every morning 90 day supply denied) 30 tablet 2    magnesium 250 MG tablet Take 400 mg by mouth every evening      Melatonin 10 MG TABS tablet Take 10 mg by mouth at bedtime      Multiple Vitamin (MULTI-VITAMIN) per tablet Take 1 tablet by mouth every  morning      ondansetron (ZOFRAN-ODT) 4 MG ODT tab Take 1 tablet (4 mg) by mouth every 12 hours as needed for nausea 180 tablet 3    spironolactone (ALDACTONE) 25 MG tablet Take 25 mg by mouth daily at 2 pm      Turmeric 500 MG CAPS Take 500 mg by mouth every morning      levothyroxine (SYNTHROID/LEVOTHROID) 112 MCG tablet TAKE 1/2 TAB BY MOUTH EVERY DAY 45 tablet 3    lisinopril (ZESTRIL) 20 MG tablet Take 1 tablet (20 mg) by mouth 2 times daily 180 tablet 0    tiZANidine (ZANAFLEX) 4 MG tablet Take 1 tablet (4 mg) by mouth at bedtime 90 tablet 1       Allergies  Allergies   Allergen Reactions    Erythromycin Nausea    Penicillins Hives     Around age 6 - doesn't recall full reaction, mother told her it was hives.     Has tolerated cephalsporins.        Social History  Social History     Socioeconomic History    Marital status:      Spouse name: Not on file    Number of children: Not on file    Years of education: Not on file    Highest education level: Not on file   Occupational History    Not on file   Tobacco Use    Smoking status: Never     Passive exposure: Never    Smokeless tobacco: Never   Vaping Use    Vaping status: Never Used   Substance and Sexual Activity    Alcohol use: Yes     Alcohol/week: 7.0 standard drinks of alcohol     Types: 7 Glasses of wine per week     Comment: Rare if ever    Drug use: Yes     Types: Marijuana     Comment: Smoke through water filter for chronic back pain PRN.    Sexual activity: Not Currently     Partners: Male     Birth control/protection: Post-menopausal, Female Surgical, None   Other Topics Concern     Service No    Blood Transfusions Not Asked    Caffeine Concern No    Occupational Exposure No    Hobby Hazards No    Sleep Concern No    Stress Concern No    Weight Concern No    Special Diet No    Back Care No    Exercise Yes     Comment: walks 4-6x  week for 20-30 min. each    Bike Helmet Not Asked    Seat Belt Yes    Self-Exams Yes    Parent/sibling w/  CABG, MI or angioplasty before 65F 55M? Yes     Comment: Father   Social History Narrative    .  Recently retired. 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        She lives with a renter.         She exercises 50 minutes three times a week.     Social Determinants of Health     Financial Resource Strain: Low Risk  (12/19/2023)    Financial Resource Strain     Within the past 12 months, have you or your family members you live with been unable to get utilities (heat, electricity) when it was really needed?: No   Food Insecurity: Low Risk  (12/19/2023)    Food Insecurity     Within the past 12 months, did you worry that your food would run out before you got money to buy more?: No     Within the past 12 months, did the food you bought just not last and you didn t have money to get more?: No   Transportation Needs: Low Risk  (12/19/2023)    Transportation Needs     Within the past 12 months, has lack of transportation kept you from medical appointments, getting your medicines, non-medical meetings or appointments, work, or from getting things that you need?: No   Physical Activity: Not on file   Stress: Not on file   Social Connections: Not on file   Interpersonal Safety: Low Risk  (11/30/2023)    Interpersonal Safety     Do you feel physically and emotionally safe where you currently live?: Yes     Within the past 12 months, have you been hit, slapped, kicked or otherwise physically hurt by someone?: No     Within the past 12 months, have you been humiliated or emotionally abused in other ways by your partner or ex-partner?: No   Housing Stability: Low Risk  (12/19/2023)    Housing Stability     Do you have housing? : Yes     Are you worried about losing your housing?: No       Family History  Family History   Problem Relation Age of Onset    Neurologic Disorder Mother         Anuerysm of Cerebral Artery, Dementia    Diabetes Mother      Thyroid Disease Mother         Hyperthyroidism (goiter)    Cerebrovascular Disease Mother         Hemorrhagic    Dementia Mother     Osteoporosis Mother     Hyperlipidemia Mother     Heart Disease Father         AAA    Hypertension Father     Coronary Artery Disease Father     Hyperlipidemia Father     Mental Illness Father         Schizophrenia?    Dementia Brother         hydrocephalis    Circulatory Brother         Perihperal Neurophathy    Diabetes Maternal Grandmother         Presumed Type 2    Asthma Maternal Grandmother     Chronic Obstructive Pulmonary Disease Maternal Grandfather         father    Asthma Maternal Grandfather     Diabetes Maternal Aunt         x2    Melanoma Maternal Aunt     Glaucoma Maternal Aunt     Breast Cancer Cousin     Breast Cancer Cousin     Dementia Other     Cancer Other         malignant melanoma    Hypertension Other     Hypertension Other     Cerebrovascular Disease Other     Cerebrovascular Disease Other     Obesity Other     Respiratory Other     Cancer Other     Diabetes Other     Asthma Other     Other Cancer Other         Malignant melanoma    Obesity Other     Macular Degeneration No family hx of     Kidney Disease No family hx of     Thrombosis No family hx of     Arthritis No family hx of     Depression No family hx of     Substance Abuse No family hx of     Cystic Fibrosis No family hx of     Early Death No family hx of     Coronary Artery Disease Early Onset No family hx of     Heart Failure No family hx of     Bleeding Diathesis No family hx of     Ovarian Cancer No family hx of     Uterine Cancer No family hx of     Prostate Cancer No family hx of     Colorectal Cancer No family hx of     Pancreatic Cancer No family hx of     Lung Cancer No family hx of     Autoimmune Disease No family hx of     Unknown/Adopted No family hx of     Genetic Disorder No family hx of     Bleeding Disorder No family hx of     Clotting Disorder No family hx of     Anesthesia Reaction No  family hx of        Review of Systems  The complete review of systems is negative other than noted in the HPI or here.   Anesthesia Evaluation   Pt has had prior anesthetic.     History of anesthetic complications  - PONV.      ROS/MED HX  ENT/Pulmonary:     (+)     LOLY risk factors, snores loudly, hypertension,   observed stopped breathing,                           (-) tobacco use   Neurologic:  - neg neurologic ROS  (-) no seizures and no CVA   Cardiovascular: Comment: MRI 4/2024  CONCLUSIONS:   1. Mildly reduced LV systolic function with LVEF of 50%.  2. Late gadolinium enhancement imaging shows mid inferolateral wall subendocardial delayed enhancement that  could represent a small size infarct.      (+) Dyslipidemia hypertension- -   -  - -                                 Previous cardiac testing   Echo: Date: 4/2024 Results:  Left ventricular function is decreased. The ejection fraction is 50-55%  (borderline). Mid inferolateral akinesis is present.  Global right ventricular function is normal.  No pericardial effusion is present.  Stress Test:  Date: Results:    ECG Reviewed:  Date: 6/2024 Results:  Sinus rhythm   Left axis deviation   Incomplete right bundle branch block   Minimal voltage criteria for LVH, may be normal variant   Cannot rule out Inferior infarct (cited on or before 16-FEB-2024)   Abnormal ECG     Cath:  Date: Results:   (-) taking anticoagulants/antiplatelets   METS/Exercise Tolerance: >4 METS Comment: Cycling, reformer. Denies cardiac symptoms   Hematologic:     (+)       history of blood transfusion, no previous transfusion reaction,     (-) history of blood clots   Musculoskeletal: Comment: Congenital deformity of ear  Sternal fracture in February       GI/Hepatic:     (+)      hiatal hernia,              Renal/Genitourinary:  - neg Renal ROS     Endo:     (+)  type II DM, Last HgA1c: 6.2, date: 3/2024, Not using insulin,     thyroid problem, hypothyroidism,        (-) chronic steroid  "usage   Psychiatric/Substance Use:  - neg psychiatric ROS     Infectious Disease:  - neg infectious disease ROS     Malignancy:  - neg malignancy ROS     Other:            /78 (BP Location: Right arm, Patient Position: Sitting, Cuff Size: Adult Large)   Pulse 72   Temp 99.8  F (37.7  C) (Oral)   Resp 16   Ht 1.651 m (5' 5\")   Wt 83.9 kg (185 lb)   LMP  (LMP Unknown)   SpO2 97%   Breastfeeding No   BMI 30.79 kg/m      Physical Exam   Constitutional: Awake, alert, cooperative, no apparent distress, and appears stated age.  Eyes: Pupils equal, round and reactive to light, extra ocular muscles intact, sclera clear, conjunctiva normal.  HENT: Normocephalic, oral pharynx with moist mucus membranes, good dentition.   Respiratory: Clear to auscultation bilaterally, no crackles or wheezing.  Cardiovascular: Regular rate and rhythm, normal S1 and S2, and no murmur noted.  Carotids no bruits. 1+ LE edema. Palpable pulses to radial arteries.   GI: Normal bowel sounds, soft, non-distended, non-tender  Genitourinary:  deferred  Skin: Warm and dry.  No rashes at anticipated surgical site.   Musculoskeletal: Full ROM of neck. There is no redness, warmth, or swelling of the joints.  Neurologic: Awake, alert, oriented to name, place and time. Cranial nerves II-XII are grossly intact. Gait is normal.   Neuropsychiatric: Calm, cooperative. Normal affect.     Prior Labs/Diagnostic Studies   All labs and imaging personally reviewed     EKG/ stress test - if available please see in ROS above   Echo result w/o MOPS: Interpretation Summary Left ventricular function is decreased. The ejection fraction is 50-55%(borderline). Mid inferolateral akinesis is present.Global right ventricular function is normal.No pericardial effusion is present.           No data to display                  The patient's records and results personally reviewed by this provider.     Outside records reviewed from: Care Everywhere    LAB/DIAGNOSTIC " "STUDIES TODAY: A1c    Assessment    Nadira Perez is a 74 year old female seen as a PAC referral for risk assessment and optimization for anesthesia.    Plan/Recommendations  Pt will be optimized for the proposed procedure.  See below for details on the assessment, risk, and preoperative recommendations    NEUROLOGY  - No history of TIA, CVA or seizure  -Post Op delirium risk factors:  No risk identified    ENT  -patient reports she has been told she needs a 5mm ET tube. Last general with supraglottic airway per chart review. This procedure is scheduled under MAC with local  Mallampati: III  TM: > 3    CARDIAC  - No history of CAD and Afib  -hypertension is controlled  -patient saw cardiology for optimization with the following recommendations:   \"Overall, she is doing well without angina or heart failure. She has findings of a small inferolateral infarction but since she is not symptomatic, coronary CTA/invasive angiogram was deferred. She is on a statin and is on antihypertensives. Due to her significant lower extremity edema and the gingival hyperplasia, the amlodipine dose was reduced. Review of her recent home BP readings would allow up-titration of beta blockers.      Patient is at an acceptable risk for the upcoming minor ear surgery as long her blood pressure is at goal SBP <140 mmHg. \"  - METS (Metabolic Equivalents)  Patient performs 4 or more METS exercise without symptoms             Total Score: 0      RCRI-Low risk: Class 2 0.9% complication rate             Total Score: 1    RCRI: CHF        PULMONARY  LOLY Medium Risk             Total Score: 4    LOLY: Snores loudly    LOLY: Observed stopped breathing    LOLY: Hypertension    LOLY: Over 50 ys old    - patient reports she has an upcoming sleep study planned  - Denies asthma or inhaler use  - Tobacco History    History   Smoking Status    Never   Smokeless Tobacco    Never       GI  -hiatal hernia  PONV High Risk  Total Score: 4           1 AN PONV: " "Pt is Female    1 AN PONV: Patient is not a current smoker    1 AN PONV: Patient has history of PONV    1 AN PONV: Intended Post Op Opioids        /RENAL  - Baseline Creatinine WNL    ENDOCRINE    - BMI: Estimated body mass index is 30.79 kg/m  as calculated from the following:    Height as of this encounter: 1.651 m (5' 5\").    Weight as of this encounter: 83.9 kg (185 lb).  Obesity (BMI >30)  -prediabetes. Patient request updated A1c to be completed today- order placed.     HEME  VTE Low Risk 0.26%             Total Score: 1    VTE: Greater than 59 yrs old      - No history of abnormal bleeding or antiplatelet use.      Different anesthesia methods/types have been discussed with the patient, but they are aware that the final plan will be decided by the assigned anesthesia provider on the date of service.    The patient is optimized for their procedure. AVS with information on surgery time/arrival time, meds and NPO status given by nursing staff. No further diagnostic testing indicated.      On the day of service:     Prep time: 7 minutes  Visit time: 18 minutes  Documentation time: 10 minutes  ------------------------------------------  Total time: 35 minutes      Karen Dee PA-C  Preoperative Assessment Center  [facility]  Phone: [phone]  Fax: [phone]    "

## 2024-07-09 NOTE — TELEPHONE ENCOUNTER
hydroCHLOROthiazide (HYDRODIURIL) 25 MG tablet         Last Written Prescription Date:  4/16/24  Last Fill Quantity: 30,   # refills: 3  Last Office Visit : 3/7/24  Future Office visit:  none

## 2024-07-10 ENCOUNTER — TELEPHONE (OUTPATIENT)
Dept: CARDIOLOGY | Facility: CLINIC | Age: 74
End: 2024-07-10

## 2024-07-10 ENCOUNTER — ANCILLARY PROCEDURE (OUTPATIENT)
Dept: GENERAL RADIOLOGY | Facility: CLINIC | Age: 74
End: 2024-07-10
Attending: FAMILY MEDICINE
Payer: COMMERCIAL

## 2024-07-10 ENCOUNTER — OFFICE VISIT (OUTPATIENT)
Dept: ORTHOPEDICS | Facility: CLINIC | Age: 74
End: 2024-07-10
Payer: COMMERCIAL

## 2024-07-10 DIAGNOSIS — M79.641 RIGHT HAND PAIN: ICD-10-CM

## 2024-07-10 DIAGNOSIS — M79.641 RIGHT HAND PAIN: Primary | ICD-10-CM

## 2024-07-10 DIAGNOSIS — S62.336A CLOSED DISPLACED FRACTURE OF NECK OF FIFTH METACARPAL BONE OF RIGHT HAND, INITIAL ENCOUNTER: Primary | ICD-10-CM

## 2024-07-10 LAB — RADIOLOGIST FLAGS: NORMAL

## 2024-07-10 PROCEDURE — 29125 APPL SHORT ARM SPLINT STATIC: CPT | Mod: RT | Performed by: FAMILY MEDICINE

## 2024-07-10 PROCEDURE — 99214 OFFICE O/P EST MOD 30 MIN: CPT | Mod: 25 | Performed by: FAMILY MEDICINE

## 2024-07-10 PROCEDURE — 73130 X-RAY EXAM OF HAND: CPT | Mod: RT | Performed by: RADIOLOGY

## 2024-07-10 RX ORDER — TRAMADOL HYDROCHLORIDE 50 MG/1
50 TABLET ORAL EVERY 6 HOURS PRN
Qty: 10 TABLET | Refills: 0 | Status: SHIPPED | OUTPATIENT
Start: 2024-07-10 | End: 2024-07-13

## 2024-07-10 NOTE — TELEPHONE ENCOUNTER
Left Voicemail (1st Attempt) for the patient to call back and schedule the following:    Appointment type:  [facility]  Provider:  Tarah   Return date: 10/30/241  Specialty phone number: [phone] opt 1   Additional appointment(s) needed: n/a   Additonal Notes: n/a

## 2024-07-10 NOTE — PROGRESS NOTES
HISTORY OF PRESENT ILLNESS  Ms. Perez is a 74 year old female who presents to clinic today with right hand pain.  Nadira reports that she fell forward out of her bed and hit her hand on the copy machine early this afternoon. She has swelling and bruising over the lateral hand. Pain over the 5th metacarpal and unable to adduct her 5th finger. She has been icing for treatment.          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    Corneal opacity    Hypothyroidism    Osteopenia, unspecified location    Aromatase inhibitor use    Chronic pain of right knee    DDD (degenerative disc disease), lumbar    Degenerative scoliosis in adult patient    Carpal tunnel syndrome of right wrist    Peripheral neuropathy    Spinal stenosis of lumbar region with neurogenic claudication    Spondylolisthesis of lumbar region    Brain lesion    Dermatochalasis of both upper eyelids    S/P spinal fusion    Chronic bilateral low back pain    PVD (posterior vitreous detachment), left eye    Vitreous opacities of left eye    Closed nondisplaced fracture of lateral malleolus of left fibula, initial encounter    Acute left ankle pain    Sacroiliitis (H24)    Lumbar radiculopathy    Lumbosacral radiculopathy    Diabetes mellitus, type 2 (H)    Abnormal electrocardiogram    Ejection fraction < 50%    Closed fracture of sternum, unspecified portion of sternum, initial encounter    Congenital abnormality  of ear       Current Outpatient Medications   Medication Sig Dispense Refill    acetaminophen (TYLENOL) 500 MG tablet Take 1,000 mg by mouth 3 times daily      amLODIPine (NORVASC) 2.5 MG tablet Take 1 tablet (2.5 mg) by mouth daily 90 tablet 1    artificial tears OINT ophthalmic ointment at bedtime      Ascorbic Acid (VITAMIN C) 500 MG CHEW Take 1 tablet by mouth every morning      atorvastatin (LIPITOR) 80 MG tablet Take 1 tablet (80 mg) by mouth daily (Patient taking differently: Take 80 mg by mouth every evening) 90 tablet 3    carvedilol (COREG) 12.5 MG tablet Take 1 tablet (12.5 mg) by mouth 2 times daily (with meals) 180 tablet 3    Cholecalciferol (VITAMIN D3) 50 MCG (2000 UT) CAPS TAKE 3 CAPSULES (6,000 UNITS) BY MOUTH DAILY. (Patient taking differently: Take 6,000 Units by mouth every morning) 270 capsule 3    CRANBERRY EXTRACT PO Take 500 mg by mouth every morning      diclofenac (VOLTAREN) 1 % topical gel Apply 2 g topically 4 times daily 100 g 6    gabapentin (NEURONTIN) 300 MG capsule Take 4 capsules (1,200 mg) by mouth 3 times daily 1080 capsule 3    HEMP OIL OR EXTRACT OR OTHER CBD CANNABINOID, NOT MEDICAL CANNABIS, THC      hydrochlorothiazide (HYDRODIURIL) 25 MG tablet Take 1 tablet (25 mg) by mouth daily 90 day supply denied (Patient taking differently: Take 25 mg by mouth every morning 90 day supply denied) 30 tablet 2    levothyroxine (SYNTHROID/LEVOTHROID) 112 MCG tablet TAKE 1/2 TAB BY MOUTH EVERY DAY 45 tablet 3    lisinopril (ZESTRIL) 20 MG tablet Take 1 tablet (20 mg) by mouth 2 times daily 180 tablet 0    magnesium 250 MG tablet Take 400 mg by mouth every evening      Melatonin 10 MG TABS tablet Take 10 mg by mouth at bedtime      Multiple Vitamin (MULTI-VITAMIN) per tablet Take 1 tablet by mouth every morning      ondansetron (ZOFRAN-ODT) 4 MG ODT tab Take 1 tablet (4 mg) by mouth every 12 hours as needed for nausea 180 tablet 3    spironolactone (ALDACTONE) 25 MG tablet Take 25 mg by  mouth daily at 2 pm      tiZANidine (ZANAFLEX) 4 MG tablet Take 1 tablet (4 mg) by mouth at bedtime 90 tablet 1    Turmeric 500 MG CAPS Take 500 mg by mouth every morning         Allergies   Allergen Reactions    Erythromycin Nausea    Penicillins Hives     Around age 6 - doesn't recall full reaction, mother told her it was hives.     Has tolerated cephalsporins.        Family History   Problem Relation Age of Onset    Neurologic Disorder Mother         Anuerysm of Cerebral Artery, Dementia    Diabetes Mother     Thyroid Disease Mother         Hyperthyroidism (goiter)    Cerebrovascular Disease Mother         Hemorrhagic    Dementia Mother     Osteoporosis Mother     Hyperlipidemia Mother     Heart Disease Father         AAA    Hypertension Father     Coronary Artery Disease Father     Hyperlipidemia Father     Mental Illness Father         Schizophrenia?    Dementia Brother         hydrocephalis    Circulatory Brother         Perihperal Neurophathy    Diabetes Maternal Grandmother         Presumed Type 2    Asthma Maternal Grandmother     Chronic Obstructive Pulmonary Disease Maternal Grandfather         father    Asthma Maternal Grandfather     Diabetes Maternal Aunt         x2    Melanoma Maternal Aunt     Glaucoma Maternal Aunt     Breast Cancer Cousin     Breast Cancer Cousin     Dementia Other     Cancer Other         malignant melanoma    Hypertension Other     Hypertension Other     Cerebrovascular Disease Other     Cerebrovascular Disease Other     Obesity Other     Respiratory Other     Cancer Other     Diabetes Other     Asthma Other     Other Cancer Other         Malignant melanoma    Obesity Other     Macular Degeneration No family hx of     Kidney Disease No family hx of     Thrombosis No family hx of     Arthritis No family hx of     Depression No family hx of     Substance Abuse No family hx of     Cystic Fibrosis No family hx of     Early Death No family hx of     Coronary Artery Disease Early Onset  No family hx of     Heart Failure No family hx of     Bleeding Diathesis No family hx of     Ovarian Cancer No family hx of     Uterine Cancer No family hx of     Prostate Cancer No family hx of     Colorectal Cancer No family hx of     Pancreatic Cancer No family hx of     Lung Cancer No family hx of     Autoimmune Disease No family hx of     Unknown/Adopted No family hx of     Genetic Disorder No family hx of     Bleeding Disorder No family hx of     Clotting Disorder No family hx of     Anesthesia Reaction No family hx of        Additional medical/Social/Surgical histories reviewed in [facility] and updated as appropriate.        PHYSICAL EXAM  General  - normal appearance, in no obvious distress    Musculoskeletal -right fifth finger  - inspection: markedly swollen ulnar dorsal hand  - palpation: Generally tender to light touch  - ROM: No angulation  Neuro  - no numbness, no motor deficit, grossly normal coordination, normal muscle tone  Skin  - no ecchymosis, erythema, warmth, or induration, no obvious rash             ASSESSMENT & PLAN  Ms. Perez is a 74 year old female who presents to clinic today with right hand pain after a fall.    I ordered and independently reviewed an xray of her right hand, this reveals a fracture of the neck of the fifth metacarpal, there is mild displacement and angulation.    We did place Ismael into a ulnar gutter splint today, we should follow-up in 1 week with repeat x-ray, out of the splint.    I do anticipate that she would need 4 to 6 weeks of nonoperative treatment, we will follow this closely moving forward.    I also prescribed pain medication for her.    It was a pleasure seeing Nadira today.    iWllis Morgan DO, [facility]  Primary Care Sports Medicine      This note was constructed using Dragon dictation software, please excuse any minor errors in spelling, grammar, or syntax.      Cast/splint application    Date/Time: 7/10/2024 4:28 PM    Performed by: Fang Melara,  ATC  Authorized by: Willis Morgan DO    Consent:     Consent obtained:  Verbal    Consent given by:  Patient    Risks discussed:  Discoloration, numbness, pain and swelling  Pre-procedure details:     Sensation:  Normal  Procedure details:     Laterality:  Right    Location:  Hand    Hand:  R hand    Splint type:  Ulnar gutter  Post-procedure details:     Pain:  Improved    Sensation:  Normal    Patient tolerance of procedure:  Tolerated well, no immediate complications    Patient provided with cast or splint care instructions: Yes

## 2024-07-10 NOTE — LETTER
7/10/2024      RE: Nadira Perez  [address]  [facility] [address]     Dear Colleague,    Thank you for referring your patient, Nadira Perez, to the [facility] [place]. Please see a copy of my visit note below.        HISTORY OF PRESENT ILLNESS  Ms. Perez is a 74 year old female who presents to clinic today with right hand pain.  Nadira reports that she fell forward out of her bed and hit her hand on the copy machine early this afternoon. She has swelling and bruising over the lateral hand. Pain over the 5th metacarpal and unable to adduct her 5th finger. She has been icing for treatment.          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     Corneal opacity     Hypothyroidism     Osteopenia, unspecified location     Aromatase inhibitor use     Chronic pain of right knee     DDD (degenerative disc disease), lumbar     Degenerative scoliosis in adult patient     Carpal tunnel syndrome of right wrist     Peripheral neuropathy     Spinal stenosis of lumbar region with neurogenic claudication     Spondylolisthesis of lumbar region     Brain lesion     Dermatochalasis of both upper eyelids     S/P spinal fusion     Chronic bilateral low back pain     PVD (posterior vitreous detachment), left eye     Vitreous opacities of left eye     Closed nondisplaced fracture of lateral malleolus of left fibula, initial  encounter     Acute left ankle pain     Sacroiliitis (H24)     Lumbar radiculopathy     Lumbosacral radiculopathy     Diabetes mellitus, type 2 (H)     Abnormal electrocardiogram     Ejection fraction < 50%     Closed fracture of sternum, unspecified portion of sternum, initial encounter     Congenital abnormality of ear       Current Outpatient Medications   Medication Sig Dispense Refill     acetaminophen (TYLENOL) 500 MG tablet Take 1,000 mg by mouth 3 times daily       amLODIPine (NORVASC) 2.5 MG tablet Take 1 tablet (2.5 mg) by mouth daily 90 tablet 1     artificial tears OINT ophthalmic ointment at bedtime       Ascorbic Acid (VITAMIN C) 500 MG CHEW Take 1 tablet by mouth every morning       atorvastatin (LIPITOR) 80 MG tablet Take 1 tablet (80 mg) by mouth daily (Patient taking differently: Take 80 mg by mouth every evening) 90 tablet 3     carvedilol (COREG) 12.5 MG tablet Take 1 tablet (12.5 mg) by mouth 2 times daily (with meals) 180 tablet 3     Cholecalciferol (VITAMIN D3) 50 MCG (2000 UT) CAPS TAKE 3 CAPSULES (6,000 UNITS) BY MOUTH DAILY. (Patient taking differently: Take 6,000 Units by mouth every morning) 270 capsule 3     CRANBERRY EXTRACT PO Take 500 mg by mouth every morning       diclofenac (VOLTAREN) 1 % topical gel Apply 2 g topically 4 times daily 100 g 6     gabapentin (NEURONTIN) 300 MG capsule Take 4 capsules (1,200 mg) by mouth 3 times daily 1080 capsule 3     HEMP OIL OR EXTRACT OR OTHER CBD CANNABINOID, NOT MEDICAL CANNABIS, THC       hydrochlorothiazide (HYDRODIURIL) 25 MG tablet Take 1 tablet (25 mg) by mouth daily 90 day supply denied (Patient taking differently: Take 25 mg by mouth every morning 90 day supply denied) 30 tablet 2     levothyroxine (SYNTHROID/LEVOTHROID) 112 MCG tablet TAKE 1/2 TAB BY MOUTH EVERY DAY 45 tablet 3     lisinopril (ZESTRIL) 20 MG tablet Take 1 tablet (20 mg) by mouth 2 times daily 180 tablet 0     magnesium 250 MG tablet Take 400 mg by mouth every evening        Melatonin 10 MG TABS tablet Take 10 mg by mouth at bedtime       Multiple Vitamin (MULTI-VITAMIN) per tablet Take 1 tablet by mouth every morning       ondansetron (ZOFRAN-ODT) 4 MG ODT tab Take 1 tablet (4 mg) by mouth every 12 hours as needed for nausea 180 tablet 3     spironolactone (ALDACTONE) 25 MG tablet Take 25 mg by mouth daily at 2 pm       tiZANidine (ZANAFLEX) 4 MG tablet Take 1 tablet (4 mg) by mouth at bedtime 90 tablet 1     Turmeric 500 MG CAPS Take 500 mg by mouth every morning         Allergies   Allergen Reactions     Erythromycin Nausea     Penicillins Hives     Around age 6 - doesn't recall full reaction, mother told her it was hives.     Has tolerated cephalsporins.        Family History   Problem Relation Age of Onset     Neurologic Disorder Mother         Anuerysm of Cerebral Artery, Dementia     Diabetes Mother      Thyroid Disease Mother         Hyperthyroidism (goiter)     Cerebrovascular Disease Mother         Hemorrhagic     Dementia Mother      Osteoporosis Mother      Hyperlipidemia Mother      Heart Disease Father         AAA     Hypertension Father      Coronary Artery Disease Father      Hyperlipidemia Father      Mental Illness Father         Schizophrenia?     Dementia Brother         hydrocephalis     Circulatory Brother         Perihperal Neurophathy     Diabetes Maternal Grandmother         Presumed Type 2     Asthma Maternal Grandmother      Chronic Obstructive Pulmonary Disease Maternal Grandfather         father     Asthma Maternal Grandfather      Diabetes Maternal Aunt         x2     Melanoma Maternal Aunt      Glaucoma Maternal Aunt      Breast Cancer Cousin      Breast Cancer Cousin      Dementia Other      Cancer Other         malignant melanoma     Hypertension Other      Hypertension Other      Cerebrovascular Disease Other      Cerebrovascular Disease Other      Obesity Other      Respiratory Other      Cancer Other      Diabetes Other      Asthma Other       Other Cancer Other         Malignant melanoma     Obesity Other      Macular Degeneration No family hx of      Kidney Disease No family hx of      Thrombosis No family hx of      Arthritis No family hx of      Depression No family hx of      Substance Abuse No family hx of      Cystic Fibrosis No family hx of      Early Death No family hx of      Coronary Artery Disease Early Onset No family hx of      Heart Failure No family hx of      Bleeding Diathesis No family hx of      Ovarian Cancer No family hx of      Uterine Cancer No family hx of      Prostate Cancer No family hx of      Colorectal Cancer No family hx of      Pancreatic Cancer No family hx of      Lung Cancer No family hx of      Autoimmune Disease No family hx of      Unknown/Adopted No family hx of      Genetic Disorder No family hx of      Bleeding Disorder No family hx of      Clotting Disorder No family hx of      Anesthesia Reaction No family hx of        Additional medical/Social/Surgical histories reviewed in [facility] and updated as appropriate.        PHYSICAL EXAM  General  - normal appearance, in no obvious distress    Musculoskeletal -right fifth finger  - inspection: markedly swollen ulnar dorsal hand  - palpation: Generally tender to light touch  - ROM: No angulation  Neuro  - no numbness, no motor deficit, grossly normal coordination, normal muscle tone  Skin  - no ecchymosis, erythema, warmth, or induration, no obvious rash             ASSESSMENT & PLAN  Ms. Perez is a 74 year old female who presents to clinic today with right hand pain after a fall.    I ordered and independently reviewed an xray of her right hand, this reveals a fracture of the neck of the fifth metacarpal, there is mild displacement and angulation.    We did place Ismael into a ulnar gutter splint today, we should follow-up in 1 week with repeat x-ray, out of the splint.    I do anticipate that she would need 4 to 6 weeks of nonoperative treatment, we will follow this  closely moving forward.    I also prescribed pain medication for her.    It was a pleasure seeing Nadira today.    Willis Morgan DO, [facility]  Primary Care Sports Medicine      This note was constructed using Dragon dictation software, please excuse any minor errors in spelling, grammar, or syntax.      Again, thank you for allowing me to participate in the care of your patient.      Sincerely,    Willis Morgan DO

## 2024-07-11 DIAGNOSIS — S62.336A CLOSED DISPLACED FRACTURE OF NECK OF FIFTH METACARPAL BONE OF RIGHT HAND, INITIAL ENCOUNTER: Primary | ICD-10-CM

## 2024-07-13 NOTE — TELEPHONE ENCOUNTER
Left Voicemail (2nd Attempt) for the patient to call back and schedule the following:    Appointment type:  RT CARDIO  Provider:  Tarah   Return date: 10/30/241  Specialty phone number: [phone] opt 1   Additional appointment(s) needed: n/a   Additonal Notes: n/a

## 2024-07-16 RX ORDER — ZOLEDRONIC ACID 5 MG/100ML
5 INJECTION, SOLUTION INTRAVENOUS ONCE
Status: CANCELLED
Start: 2024-07-23

## 2024-07-16 RX ORDER — HEPARIN SODIUM (PORCINE) LOCK FLUSH IV SOLN 100 UNIT/ML 100 UNIT/ML
5 SOLUTION INTRAVENOUS
Status: CANCELLED | OUTPATIENT
Start: 2024-07-23

## 2024-07-16 RX ORDER — METHYLPREDNISOLONE SODIUM SUCCINATE 125 MG/2ML
125 INJECTION, POWDER, LYOPHILIZED, FOR SOLUTION INTRAMUSCULAR; INTRAVENOUS
Start: 2024-07-19

## 2024-07-16 RX ORDER — ALBUTEROL SULFATE 0.83 MG/ML
2.5 SOLUTION RESPIRATORY (INHALATION)
Status: CANCELLED | OUTPATIENT
Start: 2024-07-23

## 2024-07-16 RX ORDER — DIPHENHYDRAMINE HYDROCHLORIDE 50 MG/ML
50 INJECTION INTRAMUSCULAR; INTRAVENOUS
Status: CANCELLED
Start: 2024-07-23

## 2024-07-16 RX ORDER — ALBUTEROL SULFATE 90 UG/1
1-2 AEROSOL, METERED RESPIRATORY (INHALATION)
Status: CANCELLED
Start: 2024-07-23

## 2024-07-16 RX ORDER — DIPHENHYDRAMINE HYDROCHLORIDE 50 MG/ML
50 INJECTION INTRAMUSCULAR; INTRAVENOUS
Start: 2024-07-19

## 2024-07-16 RX ORDER — HEPARIN SODIUM (PORCINE) LOCK FLUSH IV SOLN 100 UNIT/ML 100 UNIT/ML
5 SOLUTION INTRAVENOUS
OUTPATIENT
Start: 2024-07-19

## 2024-07-16 RX ORDER — ALBUTEROL SULFATE 90 UG/1
1-2 AEROSOL, METERED RESPIRATORY (INHALATION)
Start: 2024-07-19

## 2024-07-16 RX ORDER — HEPARIN SODIUM,PORCINE 10 UNIT/ML
5-20 VIAL (ML) INTRAVENOUS DAILY PRN
Status: CANCELLED | OUTPATIENT
Start: 2024-07-23

## 2024-07-16 RX ORDER — MEPERIDINE HYDROCHLORIDE 25 MG/ML
25 INJECTION INTRAMUSCULAR; INTRAVENOUS; SUBCUTANEOUS EVERY 30 MIN PRN
Status: CANCELLED | OUTPATIENT
Start: 2024-07-23

## 2024-07-16 RX ORDER — ACETAMINOPHEN 325 MG/1
325 TABLET ORAL EVERY 8 HOURS PRN
OUTPATIENT
Start: 2024-07-19

## 2024-07-16 RX ORDER — MEPERIDINE HYDROCHLORIDE 25 MG/ML
25 INJECTION INTRAMUSCULAR; INTRAVENOUS; SUBCUTANEOUS EVERY 30 MIN PRN
OUTPATIENT
Start: 2024-07-19

## 2024-07-16 RX ORDER — METHYLPREDNISOLONE SODIUM SUCCINATE 125 MG/2ML
125 INJECTION, POWDER, LYOPHILIZED, FOR SOLUTION INTRAMUSCULAR; INTRAVENOUS
Status: CANCELLED
Start: 2024-07-23

## 2024-07-16 RX ORDER — HEPARIN SODIUM,PORCINE 10 UNIT/ML
5-20 VIAL (ML) INTRAVENOUS DAILY PRN
OUTPATIENT
Start: 2024-07-19

## 2024-07-16 RX ORDER — ALBUTEROL SULFATE 0.83 MG/ML
2.5 SOLUTION RESPIRATORY (INHALATION)
OUTPATIENT
Start: 2024-07-19

## 2024-07-16 RX ORDER — EPINEPHRINE 1 MG/ML
0.3 INJECTION, SOLUTION, CONCENTRATE INTRAVENOUS EVERY 5 MIN PRN
OUTPATIENT
Start: 2024-07-19

## 2024-07-16 RX ORDER — EPINEPHRINE 1 MG/ML
0.3 INJECTION, SOLUTION, CONCENTRATE INTRAVENOUS EVERY 5 MIN PRN
Status: CANCELLED | OUTPATIENT
Start: 2024-07-23

## 2024-07-17 ENCOUNTER — OFFICE VISIT (OUTPATIENT)
Dept: ORTHOPEDICS | Facility: CLINIC | Age: 74
End: 2024-07-17
Payer: COMMERCIAL

## 2024-07-17 ENCOUNTER — ANCILLARY PROCEDURE (OUTPATIENT)
Dept: GENERAL RADIOLOGY | Facility: CLINIC | Age: 74
End: 2024-07-17
Attending: FAMILY MEDICINE
Payer: COMMERCIAL

## 2024-07-17 DIAGNOSIS — S62.336D CLOSED DISPLACED FRACTURE OF NECK OF FIFTH METACARPAL BONE OF RIGHT HAND WITH ROUTINE HEALING, SUBSEQUENT ENCOUNTER: Primary | ICD-10-CM

## 2024-07-17 DIAGNOSIS — S62.336A CLOSED DISPLACED FRACTURE OF NECK OF FIFTH METACARPAL BONE OF RIGHT HAND, INITIAL ENCOUNTER: ICD-10-CM

## 2024-07-17 PROCEDURE — 73130 X-RAY EXAM OF HAND: CPT | Mod: RT | Performed by: RADIOLOGY

## 2024-07-17 PROCEDURE — 99207 PR NO CHARGE LOS: CPT | Performed by: FAMILY MEDICINE

## 2024-07-17 PROCEDURE — 29075 APPL CST ELBW FNGR SHORT ARM: CPT | Mod: RT | Performed by: FAMILY MEDICINE

## 2024-07-17 NOTE — LETTER
7/17/2024      RE: Nadira Perez  [address]  [facility] [address]     Dear Colleague,    Thank you for referring your patient, Nadira Perez, to the [facility] [place]. Please see a copy of my visit note below.    HISTORY OF PRESENT ILLNESS  Ms. Perez is a pleasant 74 year old female following up with 5th metacarpal fracture  Ismael has been doing relatively well since seeing me last week.  She has had no new significant events or concerns.  Her swelling has improved somewhat.     PHYSICAL EXAM  General  - normal appearance, in no obvious distress     Musculoskeletal -right fifth finger  - inspection: swollen ulnar dorsal hand  - palpation: no significant tenderness  - ROM: No angulation  Neuro  - no numbness, no motor deficit, grossly normal coordination, normal muscle tone  Skin  - no ecchymosis, erythema, warmth, or induration, no obvious rash        ASSESSMENT & PLAN  Ms. Perez is a 74 year old female following up with a 5th metacarpal fracture in her right hand.    I ordered & independently reviewed an xray of her right hand, this redemonstrates her fracture at the neck of the 5th metacarpal with mild displacement and angulation.  There is some early signs of healing.    We did place her into a ulnar gutter cast.  She should follow-up with me in 3 weeks with a repeat x-ray, out of the cast.  We did discuss that if she is having issues with her cast she can be seen sooner for a cast replacement with a nurse only visit.    It was a pleasure seeing Miguel        Willis Morgan, , CAQSM      This note was constructed using Dragon dictation software, please excuse any minor errors in spelling, grammar, or syntax.      Relevant Diagnosis: Right 5th MC fx     Ulnar Gutter application and care    Type of Cast applied: Ulnar Gutter   Cast/Splinting material used: Fiberglass    Person(s) involved in teaching:   Patient     Motivation Level:  Asks Questions:  Yes  Eager to Learn: Yes  Cooperative: Yes  Receptive (willing/able to accept information): Yes     Patient demonstrates understanding of the following:   Reason for the appointment, diagnosis and treatment plan: Yes    Which situations necessitate calling provider and whom to contact: Yes     Teaching Concerns Addressed:   None     Proper use and care of Ulnar Gutter cast: Yes  Pain management techniques: Yes  Wound Care: Yes  How and/when to access community resources: Yes     Cast was applied in standard Manner:  Yes  Cast fit well:  Yes  Patient reports cast to fit comfortably:  Yes    Instructional Materials Used/Given: None        Cast/splint application    Date/Time: 7/17/2024 5:25 PM    Performed by: Fausto Browning ATC  Authorized by: Willis Morgan DO    Consent:     Consent obtained:  Verbal    Consent given by:  Patient    Risks discussed:  Discoloration, numbness, pain and swelling  Pre-procedure details:     Sensation:  Normal  Procedure details:     Laterality:  Right    Location:  Hand    Hand:  R hand    Cast type:  Ulnar gutter    Supplies:  Fiberglass  Post-procedure details:     Pain:  Unchanged    Sensation:  Normal    Patient tolerance of procedure:  Tolerated well, no immediate complications    Patient provided with cast or splint care instructions: Yes    Comments:        Fausto Browning M.A., LAT, ATC  Certified Athletic Trainer                 Again, thank you for allowing me to participate in the care of your patient.      Sincerely,    Willis Morgan DO

## 2024-07-17 NOTE — PROGRESS NOTES
Relevant Diagnosis: Right 5th MC fx     Ulnar Gutter application and care    Type of Cast applied: Ulnar Gutter   Cast/Splinting material used: Fiberglass    Person(s) involved in teaching:   Patient     Motivation Level:  Asks Questions: Yes  Eager to Learn: Yes  Cooperative: Yes  Receptive (willing/able to accept information): Yes     Patient demonstrates understanding of the following:   Reason for the appointment, diagnosis and treatment plan: Yes    Which situations necessitate calling provider and whom to contact: Yes     Teaching Concerns Addressed:   None     Proper use and care of Ulnar Gutter cast: Yes  Pain management techniques: Yes  Wound Care: Yes  How and/when to access community resources: Yes     Cast was applied in standard Manner:  Yes  Cast fit well:  Yes  Patient reports cast to fit comfortably:  Yes    Instructional Materials Used/Given: None        Cast/splint application    Date/Time: 7/17/2024 5:25 PM    Performed by: Fausto Browning ATC  Authorized by: Willis Morgan DO    Consent:     Consent obtained:  Verbal    Consent given by:  Patient    Risks discussed:  Discoloration, numbness, pain and swelling  Pre-procedure details:     Sensation:  Normal  Procedure details:     Laterality:  Right    Location:  Hand    Hand:  R hand    Cast type:  Ulnar gutter    Supplies:  Fiberglass  Post-procedure details:     Pain:  Unchanged    Sensation:  Normal    Patient tolerance of procedure:  Tolerated well, no immediate complications    Patient provided with cast or splint care instructions: Yes    Comments:        Fausto Browning M.A., LAT, ATC  Certified Athletic Trainer

## 2024-07-18 ENCOUNTER — TELEPHONE (OUTPATIENT)
Dept: ORTHOPEDICS | Facility: CLINIC | Age: 74
End: 2024-07-18
Payer: COMMERCIAL

## 2024-07-18 NOTE — TELEPHONE ENCOUNTER
Patient called the same day line saying her ulnar gutter cast is painful and rubbing on her ulna. She wanted a cast change with extra padding, she is afraid she will get an ulcer. I discussed that the cast was just placed yesterday and the AT that did the cast purposely added extra padding for her, it would be very unlikely she would get an ulcer. I told her she needs to give it more time to get used to the cast as she was placed in the cast less than 24 hours. Unfortunately an ulnar gutter cast  can be uncomfortable and take some getting used to. We compromised with giving it today to get used to and she will call tomorrow afternoon if it continues to be a problem.

## 2024-07-19 ENCOUNTER — ALLIED HEALTH/NURSE VISIT (OUTPATIENT)
Dept: ORTHOPEDICS | Facility: CLINIC | Age: 74
End: 2024-07-19
Payer: COMMERCIAL

## 2024-07-19 ENCOUNTER — PATIENT OUTREACH (OUTPATIENT)
Dept: ONCOLOGY | Facility: CLINIC | Age: 74
End: 2024-07-19

## 2024-07-19 DIAGNOSIS — S62.336D CLOSED DISPLACED FRACTURE OF NECK OF FIFTH METACARPAL BONE OF RIGHT HAND WITH ROUTINE HEALING, SUBSEQUENT ENCOUNTER: Primary | ICD-10-CM

## 2024-07-19 PROCEDURE — 29085 APPL CAST HAND&LWR FOREARM: CPT | Mod: RT

## 2024-07-19 NOTE — PROGRESS NOTES
[facility]: Cancer Care                                                                                         Completed chart audit to assign Oncology Care Coordination enrollment status.      Mary Jara MSN, RN, OCN   RN Care Coordinator   [facility]

## 2024-07-19 NOTE — PROGRESS NOTES
Patient called in earlier this morning stating that her right ulnar gutter cast is still causing her pain in several areas and is worried about getting a skin ulcer. Patient came into clinic for evaluation and possible cast removal and cast re-application. Upon patient arriving to clinic, the ulnar gutter cast did look intact. At patient's request, her ulnar gutter cast was removed. There were no skin ulcers or abrasions upon cast removal. Patient was placed in a new ulnar gutter cast and she stated that it was fitting well when she left. Dr. Rolan Morgan was on site as reflected below in the procedure note.  Gia Dewitt ATC on 7/19/2024 at 12:15 PM      Cast/splint application    Date/Time: 7/19/2024 12:16 PM    Performed by: Gia Dewitt  Authorized by: Rolan Morgan DO    Consent:     Consent obtained:  Verbal    Consent given by:  Patient    Risks discussed:  Discoloration, numbness, pain and swelling    Alternatives discussed:  No treatment  Procedure details:     Laterality:  Right    Location:  Hand    Hand:  R hand    Cast type:  Ulnar gutter    Supplies:  Fiberglass  Post-procedure details:     Pain:  Improved    Pain level:  0/10    Sensation:  Normal    Patient tolerance of procedure:  Tolerated well, no immediate complications    Patient provided with cast or splint care instructions: Yes

## 2024-07-22 ENCOUNTER — TELEPHONE (OUTPATIENT)
Dept: PLASTIC SURGERY | Facility: CLINIC | Age: 74
End: 2024-07-22
Payer: COMMERCIAL

## 2024-07-22 RX ORDER — ALBUTEROL SULFATE 0.83 MG/ML
2.5 SOLUTION RESPIRATORY (INHALATION)
OUTPATIENT
Start: 2025-07-17

## 2024-07-22 RX ORDER — HEPARIN SODIUM,PORCINE 10 UNIT/ML
5-20 VIAL (ML) INTRAVENOUS DAILY PRN
OUTPATIENT
Start: 2025-07-17

## 2024-07-22 RX ORDER — HEPARIN SODIUM (PORCINE) LOCK FLUSH IV SOLN 100 UNIT/ML 100 UNIT/ML
5 SOLUTION INTRAVENOUS
OUTPATIENT
Start: 2025-07-17

## 2024-07-22 RX ORDER — METHYLPREDNISOLONE SODIUM SUCCINATE 125 MG/2ML
125 INJECTION, POWDER, LYOPHILIZED, FOR SOLUTION INTRAMUSCULAR; INTRAVENOUS
Start: 2025-07-17

## 2024-07-22 RX ORDER — DIPHENHYDRAMINE HYDROCHLORIDE 50 MG/ML
50 INJECTION INTRAMUSCULAR; INTRAVENOUS
Start: 2025-07-17

## 2024-07-22 RX ORDER — MEPERIDINE HYDROCHLORIDE 25 MG/ML
25 INJECTION INTRAMUSCULAR; INTRAVENOUS; SUBCUTANEOUS EVERY 30 MIN PRN
OUTPATIENT
Start: 2025-07-17

## 2024-07-22 RX ORDER — ALBUTEROL SULFATE 90 UG/1
1-2 AEROSOL, METERED RESPIRATORY (INHALATION)
Start: 2025-07-17

## 2024-07-22 RX ORDER — EPINEPHRINE 1 MG/ML
0.3 INJECTION, SOLUTION INTRAMUSCULAR; SUBCUTANEOUS EVERY 5 MIN PRN
OUTPATIENT
Start: 2025-07-17

## 2024-07-22 RX ORDER — ZOLEDRONIC ACID 5 MG/100ML
5 INJECTION, SOLUTION INTRAVENOUS ONCE
Start: 2025-07-17

## 2024-07-22 NOTE — TELEPHONE ENCOUNTER
Called pt to discuss infusion prior to surgery. Pt did not answer but I was able to leave a VM with my direct call back number to discuss.  Francisco ELDER RN

## 2024-07-22 NOTE — TELEPHONE ENCOUNTER
[facility] Call Center    Phone Message    May a detailed message be left on voicemail: yes     Reason for Call: Other: Requesting a call back, has an infusion (Reclast) tomorrow and surgery 2 days after. Wants to know if this ok. Please call back to provide direction.     Action Taken: Other: plast surg    Travel Screening: Not Applicable     Date of Service:

## 2024-07-23 ENCOUNTER — TELEPHONE (OUTPATIENT)
Dept: ORTHOPEDICS | Facility: CLINIC | Age: 74
End: 2024-07-23

## 2024-07-23 ENCOUNTER — INFUSION THERAPY VISIT (OUTPATIENT)
Dept: INFUSION THERAPY | Facility: CLINIC | Age: 74
End: 2024-07-23
Attending: FAMILY MEDICINE
Payer: COMMERCIAL

## 2024-07-23 ENCOUNTER — APPOINTMENT (OUTPATIENT)
Dept: LAB | Facility: CLINIC | Age: 74
End: 2024-07-23
Attending: FAMILY MEDICINE
Payer: COMMERCIAL

## 2024-07-23 VITALS
BODY MASS INDEX: 29.95 KG/M2 | WEIGHT: 180 LBS | SYSTOLIC BLOOD PRESSURE: 115 MMHG | TEMPERATURE: 99.6 F | RESPIRATION RATE: 16 BRPM | HEART RATE: 76 BPM | OXYGEN SATURATION: 97 % | DIASTOLIC BLOOD PRESSURE: 71 MMHG

## 2024-07-23 DIAGNOSIS — I10 ESSENTIAL HYPERTENSION, BENIGN: ICD-10-CM

## 2024-07-23 DIAGNOSIS — M81.0 AGE-RELATED OSTEOPOROSIS WITHOUT CURRENT PATHOLOGICAL FRACTURE: Primary | ICD-10-CM

## 2024-07-23 DIAGNOSIS — I10 ESSENTIAL HYPERTENSION, BENIGN: Primary | ICD-10-CM

## 2024-07-23 LAB
CALCIUM SERPL-MCNC: 9.6 MG/DL (ref 8.8–10.4)
CREAT SERPL-MCNC: 0.75 MG/DL (ref 0.51–0.95)
EGFRCR SERPLBLD CKD-EPI 2021: 84 ML/MIN/1.73M2
POTASSIUM SERPL-SCNC: 3.7 MMOL/L (ref 3.4–5.3)

## 2024-07-23 PROCEDURE — 84132 ASSAY OF SERUM POTASSIUM: CPT

## 2024-07-23 PROCEDURE — 250N000011 HC RX IP 250 OP 636: Performed by: FAMILY MEDICINE

## 2024-07-23 PROCEDURE — 96365 THER/PROPH/DIAG IV INF INIT: CPT

## 2024-07-23 PROCEDURE — 82310 ASSAY OF CALCIUM: CPT | Performed by: FAMILY MEDICINE

## 2024-07-23 PROCEDURE — 36415 COLL VENOUS BLD VENIPUNCTURE: CPT | Performed by: FAMILY MEDICINE

## 2024-07-23 PROCEDURE — 82565 ASSAY OF CREATININE: CPT | Performed by: FAMILY MEDICINE

## 2024-07-23 RX ORDER — ZOLEDRONIC ACID 5 MG/100ML
5 INJECTION, SOLUTION INTRAVENOUS ONCE
Status: COMPLETED | OUTPATIENT
Start: 2024-07-23 | End: 2024-07-23

## 2024-07-23 RX ADMIN — ZOLEDRONIC ACID 5 MG: 0.05 INJECTION, SOLUTION INTRAVENOUS at 16:07

## 2024-07-23 ASSESSMENT — PAIN SCALES - GENERAL: PAINLEVEL: NO PAIN (0)

## 2024-07-23 NOTE — TELEPHONE ENCOUNTER
----- Message from Pema MUHAMMAD sent at 7/23/2024  3:39 PM CDT -----  Regarding: FW: Can potassium be added to labs?    ----- Message -----  From: Fang Cisneros  Sent: 7/23/2024   3:38 PM CDT  To: Sports Med Triage-[facility]  Subject: Can potassium be added to labs?                  Hi!    This patient got labs ordered by Dr. Clark. They are wondering if potassium can also be added?    Thanks,    Fang

## 2024-07-23 NOTE — NURSING NOTE
Chief Complaint   Patient presents with    Blood Draw     Vpt blood draw by lab RN       Labs drawn via IV placed by lab RN. Vitals taken and appointment arrived.    Gia Medina RN

## 2024-07-23 NOTE — PROGRESS NOTES
"Infusion Nursing Note:  Nadira Perez presents today for Patient presents with:  Blood Draw: Vpt blood draw by lab RN      Patient seen by provider today: No   present during visit today: No    Note: During today's [facility] appointment, orders from Cortney Clark MD  were completed.  Patient identification verified by name and date of birth.  Assessment completed.  Vitals recorded in Doc Flowsheets.  Patient was provided with education regarding medication/procedure and possible side effects.  Patient verbalized understanding.  Secure chat message to Dr. Clark patient here for her Reclast infusion. She has a 3 week old fracture (Wrist?) should we hold reclast   MD response  \"should be Ok to give with typical fracture\"     Frequency: yearly    Infusion Rate/Length: Reclast  infused at *400 mL/hr. Total infusion time of approximately 15 minutes     Intravenous Access:  Labs drawn without difficulty by lab staff  Peripheral IV placed.    Treatment Conditions:  Results reviewed, labs MET treatment parameters, ok to proceed with treatment.  Calcium   Date Value Ref Range Status   07/23/2024 9.6 8.8 - 10.4 mg/dL Final     Comment:     Reference intervals for this test were updated on 7/16/2024 to reflect our healthy population more accurately. There may be differences in the flagging of prior results with similar values performed with this method. Those prior results can be interpreted in the context of the updated reference intervals.   05/07/2021 9.6 8.5 - 10.1 mg/dL Final   05/07/2021 9.5 8.5 - 10.1 mg/dL Final     Creatinine   Date Value Ref Range Status   07/23/2024 0.75 0.51 - 0.95 mg/dL Final   05/07/2021 0.73 0.52 - 1.04 mg/dL Final   05/07/2021 0.74 0.52 - 1.04 mg/dL Final     Post Infusion Assessment:  Patient tolerated infusion without incident.  Site patent and intact, free from redness, edema or discomfort.  No evidence of extravasations.  Access " discontinued per protocol.     Discharge Plan:   Discharge instructions reviewed with: Patient.  AVS to patient via MYCHART.  Patient discharged in stable condition accompanied by: self.  Departure Mode: Ambulatory    /71   Pulse 76   Temp 99.6  F (37.6  C) (Oral)   Resp 16   Wt 81.6 kg (180 lb)   LMP  (LMP Unknown)   SpO2 97%   BMI 29.95 kg/m    Jelly Castillo RN on 7/23/2024 at 3:14 PM  .

## 2024-07-23 NOTE — PATIENT INSTRUCTIONS
Dear Nadira Perez    Thank you for choosing [facility] ([facility] for your infusion.  The following information is a summary of our appointment as well as important reminders.        If you are a transplant patient and require transplant education, please click on this link: [email or web address].    If you have any questions on your upcoming Specialty Infusion appointments, please call scheduling at [phone].  It was a pleasure taking care of you today.    Sincerely,    [facility]  [address] [place] [address]  [address]  Phone:  [phone]

## 2024-07-25 ENCOUNTER — HOSPITAL ENCOUNTER (OUTPATIENT)
Facility: AMBULATORY SURGERY CENTER | Age: 74
Discharge: HOME | End: 2024-07-25
Attending: STUDENT IN AN ORGANIZED HEALTH CARE EDUCATION/TRAINING PROGRAM
Payer: COMMERCIAL

## 2024-07-25 ENCOUNTER — ANESTHESIA (OUTPATIENT)
Dept: SURGERY | Facility: AMBULATORY SURGERY CENTER | Age: 74
End: 2024-07-25
Payer: COMMERCIAL

## 2024-07-25 VITALS
SYSTOLIC BLOOD PRESSURE: 184 MMHG | TEMPERATURE: 97.8 F | HEART RATE: 62 BPM | RESPIRATION RATE: 12 BRPM | DIASTOLIC BLOOD PRESSURE: 97 MMHG | BODY MASS INDEX: 30.32 KG/M2 | OXYGEN SATURATION: 93 % | HEIGHT: 65 IN | WEIGHT: 182 LBS

## 2024-07-25 DIAGNOSIS — Q17.9 CONGENITAL ABNORMALITY OF EAR: Primary | ICD-10-CM

## 2024-07-25 LAB — GLUCOSE BLDC GLUCOMTR-MCNC: 112 MG/DL (ref 70–99)

## 2024-07-25 PROCEDURE — 69300 REVISE EXTERNAL EAR: CPT | Performed by: ANESTHESIOLOGY

## 2024-07-25 PROCEDURE — 82962 GLUCOSE BLOOD TEST: CPT | Mod: GZ | Performed by: PATHOLOGY

## 2024-07-25 PROCEDURE — 69300 REVISE EXTERNAL EAR: CPT | Mod: RT | Performed by: STUDENT IN AN ORGANIZED HEALTH CARE EDUCATION/TRAINING PROGRAM

## 2024-07-25 PROCEDURE — 99100 ANES PT EXTEME AGE<1 YR&>70: CPT | Performed by: NURSE ANESTHETIST, CERTIFIED REGISTERED

## 2024-07-25 PROCEDURE — 99100 ANES PT EXTEME AGE<1 YR&>70: CPT | Performed by: ANESTHESIOLOGY

## 2024-07-25 PROCEDURE — 69300 REVISE EXTERNAL EAR: CPT | Performed by: NURSE ANESTHETIST, CERTIFIED REGISTERED

## 2024-07-25 PROCEDURE — 69300 REVISE EXTERNAL EAR: CPT | Mod: RT

## 2024-07-25 RX ORDER — DOXYCYCLINE 100 MG/1
100 CAPSULE ORAL 2 TIMES DAILY
Qty: 6 CAPSULE | Refills: 0 | Status: SHIPPED | OUTPATIENT
Start: 2024-07-25 | End: 2024-08-16

## 2024-07-25 RX ORDER — FENTANYL CITRATE 50 UG/ML
25 INJECTION, SOLUTION INTRAMUSCULAR; INTRAVENOUS
Status: DISCONTINUED | OUTPATIENT
Start: 2024-07-25 | End: 2024-07-26 | Stop reason: HOSPADM

## 2024-07-25 RX ORDER — ACETAMINOPHEN 325 MG/1
975 TABLET ORAL ONCE
Status: COMPLETED | OUTPATIENT
Start: 2024-07-25 | End: 2024-07-25

## 2024-07-25 RX ORDER — OXYCODONE HYDROCHLORIDE 5 MG/1
5 TABLET ORAL
Status: DISCONTINUED | OUTPATIENT
Start: 2024-07-25 | End: 2024-07-26 | Stop reason: HOSPADM

## 2024-07-25 RX ORDER — CEFAZOLIN SODIUM 2 G/50ML
2 SOLUTION INTRAVENOUS SEE ADMIN INSTRUCTIONS
Status: DISCONTINUED | OUTPATIENT
Start: 2024-07-25 | End: 2024-07-26 | Stop reason: HOSPADM

## 2024-07-25 RX ORDER — LIDOCAINE HYDROCHLORIDE AND EPINEPHRINE 10; 10 MG/ML; UG/ML
INJECTION, SOLUTION INFILTRATION; PERINEURAL PRN
Status: DISCONTINUED | OUTPATIENT
Start: 2024-07-25 | End: 2024-07-25 | Stop reason: HOSPADM

## 2024-07-25 RX ORDER — PROPOFOL 10 MG/ML
INJECTION, EMULSION INTRAVENOUS CONTINUOUS PRN
Status: DISCONTINUED | OUTPATIENT
Start: 2024-07-25 | End: 2024-07-25

## 2024-07-25 RX ORDER — CEFAZOLIN SODIUM 2 G/50ML
2 SOLUTION INTRAVENOUS
Status: COMPLETED | OUTPATIENT
Start: 2024-07-25 | End: 2024-07-25

## 2024-07-25 RX ORDER — LIDOCAINE HYDROCHLORIDE 20 MG/ML
INJECTION, SOLUTION INFILTRATION; PERINEURAL PRN
Status: DISCONTINUED | OUTPATIENT
Start: 2024-07-25 | End: 2024-07-25

## 2024-07-25 RX ORDER — DEXAMETHASONE SODIUM PHOSPHATE 10 MG/ML
4 INJECTION, SOLUTION INTRAMUSCULAR; INTRAVENOUS
Status: DISCONTINUED | OUTPATIENT
Start: 2024-07-25 | End: 2024-07-26 | Stop reason: HOSPADM

## 2024-07-25 RX ORDER — ONDANSETRON 4 MG/1
4 TABLET, ORALLY DISINTEGRATING ORAL EVERY 30 MIN PRN
Status: DISCONTINUED | OUTPATIENT
Start: 2024-07-25 | End: 2024-07-26 | Stop reason: HOSPADM

## 2024-07-25 RX ORDER — OXYCODONE HYDROCHLORIDE 5 MG/1
10 TABLET ORAL
Status: DISCONTINUED | OUTPATIENT
Start: 2024-07-25 | End: 2024-07-26 | Stop reason: HOSPADM

## 2024-07-25 RX ORDER — SODIUM CHLORIDE, SODIUM LACTATE, POTASSIUM CHLORIDE, CALCIUM CHLORIDE 600; 310; 30; 20 MG/100ML; MG/100ML; MG/100ML; MG/100ML
INJECTION, SOLUTION INTRAVENOUS CONTINUOUS
Status: DISCONTINUED | OUTPATIENT
Start: 2024-07-25 | End: 2024-07-26 | Stop reason: HOSPADM

## 2024-07-25 RX ORDER — ONDANSETRON 2 MG/ML
4 INJECTION INTRAMUSCULAR; INTRAVENOUS EVERY 30 MIN PRN
Status: DISCONTINUED | OUTPATIENT
Start: 2024-07-25 | End: 2024-07-26 | Stop reason: HOSPADM

## 2024-07-25 RX ORDER — NALOXONE HYDROCHLORIDE 0.4 MG/ML
0.1 INJECTION, SOLUTION INTRAMUSCULAR; INTRAVENOUS; SUBCUTANEOUS
Status: DISCONTINUED | OUTPATIENT
Start: 2024-07-25 | End: 2024-07-26 | Stop reason: HOSPADM

## 2024-07-25 RX ORDER — LIDOCAINE 40 MG/G
CREAM TOPICAL
Status: DISCONTINUED | OUTPATIENT
Start: 2024-07-25 | End: 2024-07-26 | Stop reason: HOSPADM

## 2024-07-25 RX ORDER — PROPOFOL 10 MG/ML
INJECTION, EMULSION INTRAVENOUS PRN
Status: DISCONTINUED | OUTPATIENT
Start: 2024-07-25 | End: 2024-07-25

## 2024-07-25 RX ORDER — ONDANSETRON 2 MG/ML
INJECTION INTRAMUSCULAR; INTRAVENOUS PRN
Status: DISCONTINUED | OUTPATIENT
Start: 2024-07-25 | End: 2024-07-25

## 2024-07-25 RX ORDER — FENTANYL CITRATE 50 UG/ML
INJECTION, SOLUTION INTRAMUSCULAR; INTRAVENOUS PRN
Status: DISCONTINUED | OUTPATIENT
Start: 2024-07-25 | End: 2024-07-25

## 2024-07-25 RX ORDER — OXYCODONE HYDROCHLORIDE 5 MG/1
5 TABLET ORAL EVERY 6 HOURS PRN
Qty: 12 TABLET | Refills: 0 | Status: SHIPPED | OUTPATIENT
Start: 2024-07-25 | End: 2024-07-28

## 2024-07-25 RX ORDER — ONDANSETRON 4 MG/1
4 TABLET, FILM COATED ORAL EVERY 6 HOURS PRN
Qty: 12 TABLET | Refills: 0 | Status: SHIPPED | OUTPATIENT
Start: 2024-07-25

## 2024-07-25 RX ADMIN — SODIUM CHLORIDE, SODIUM LACTATE, POTASSIUM CHLORIDE, CALCIUM CHLORIDE: 600; 310; 30; 20 INJECTION, SOLUTION INTRAVENOUS at 08:46

## 2024-07-25 RX ADMIN — FENTANYL CITRATE 50 MCG: 50 INJECTION, SOLUTION INTRAMUSCULAR; INTRAVENOUS at 11:32

## 2024-07-25 RX ADMIN — PROPOFOL 100 MG: 10 INJECTION, EMULSION INTRAVENOUS at 10:16

## 2024-07-25 RX ADMIN — ONDANSETRON 4 MG: 2 INJECTION INTRAMUSCULAR; INTRAVENOUS at 11:07

## 2024-07-25 RX ADMIN — PROPOFOL 200 MCG/KG/MIN: 10 INJECTION, EMULSION INTRAVENOUS at 10:06

## 2024-07-25 RX ADMIN — FENTANYL CITRATE 25 MCG: 50 INJECTION, SOLUTION INTRAMUSCULAR; INTRAVENOUS at 10:06

## 2024-07-25 RX ADMIN — LIDOCAINE HYDROCHLORIDE 100 MG: 20 INJECTION, SOLUTION INFILTRATION; PERINEURAL at 10:08

## 2024-07-25 RX ADMIN — FENTANYL CITRATE 25 MCG: 50 INJECTION, SOLUTION INTRAMUSCULAR; INTRAVENOUS at 11:20

## 2024-07-25 RX ADMIN — CEFAZOLIN SODIUM 2 G: 2 SOLUTION INTRAVENOUS at 09:58

## 2024-07-25 ASSESSMENT — LIFESTYLE VARIABLES: TOBACCO_USE: 0

## 2024-07-25 ASSESSMENT — ENCOUNTER SYMPTOMS: SEIZURES: 0

## 2024-07-25 NOTE — ANESTHESIA POSTPROCEDURE EVALUATION
Patient: Nadira Perez    Procedure: Procedure(s):  Right Revision Functional Otoplasty       Anesthesia Type:  MAC    Note:  Disposition: Outpatient   Postop Pain Control: Uneventful            Sign Out: Well controlled pain   PONV: No   Neuro/Psych: Uneventful            Sign Out: Acceptable/Baseline neuro status   Airway/Respiratory: Uneventful            Sign Out: Acceptable/Baseline resp. status   CV/Hemodynamics: Uneventful            Sign Out: Acceptable CV status; No obvious hypovolemia; No obvious fluid overload   Other NRE: NONE   DID A NON-ROUTINE EVENT OCCUR?            Last vitals:  Vitals Value Taken Time   /81 07/25/24 1200   Temp 36.6  C (97.9  F) 07/25/24 1200   Pulse 67 07/25/24 1204   Resp 15 07/25/24 1204   SpO2 92 % 07/25/24 1204   Vitals shown include unfiled device data.    Electronically Signed By: Dayo Nunez MD  July 25, 2024  2:13 PM

## 2024-07-25 NOTE — OP NOTE
PLASTIC SURGERY OPERATIVE REPORT     Date of Surgery: 7/25/2024  Surgical Service: Plastic Surgery     Preoperative Diagnosis:   1.  History of several prior right otoplasty surgeries  2.  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  On exam, there appears to be fracture of the helical cartilage that is contributing to the functional issue.  Discussed options for management including revision otoplasty surgery.  Explained that it is difficult to predict whether this can help, but my recommendation would be to explore, repair of the fractured cartilage and to perform a revision otoplasty as needed.  Patient agrees to the plan.    Discussed the risks of surgery, including but not limited to: infection, bleeding, pain, poor scarring, suboptimal aesthetic result, residual deformity, recurrence of deformity, donor site defect, wound healing issues, asymmetries, anesthesia related complication, cardiac arrest.  Despite these risks, patient consents to and would like to proceed with right functional otoplasty, possible use of a cartilage graft from the left ear.  All questions answered.      Intraoperative findings: Superior margin of helical cartilage was fractured and folded over.  This was corrected with several 4-0 PDS sutures to help unfurl the cartilage.  Additionally, there was  some fractured cartilage along the lower helical margin that was trimmed and used as an onlay graft to the superior helix.  There was improved upper ear contour following the revision otoplasty.     Description of Procedure: Patient was seen in the preoperative holding area.  Consent was verified.  The right ear was marked.  All additional questions were answered.  Discussed the risks of surgery, including but not limited to: Infection, bleeding, pain, poor scarring, suboptimal aesthetic result, residual deformity, recurrence of deformity, donor site defect, wound healing issues, asymmetries, anesthesia related complication, cardiac arrest.  Despite these risks, patient consents to and would like to proceed with right functional otoplasty, possible use of a cartilage graft from the left ear.  All questions answered.  Patient was then transferred to the operating room and placed supine on the operating table.  All pressure points were padded.  Sequential compression devices were placed on bilateral lower extremities and verified to be operational.  IV antibiotic prophylaxis was given.  Preinduction timeout was performed.  Monitored anesthesia care was commenced.  An LMA was placed after patient was found to be difficult to adequately ventilate with an oxygen mask alone.  The right ear was prepped and draped in usual sterile fashion.  Timeout was performed.  Along the prior right posterior auricular scar, a subdermal injection of 1% lidocaine containing epinephrine was done.  Next, using a 15 blade, the skin was incised along the prior scar.  Next, using #69 blade, the skin of the posterior auricle was elevated off of the cartilage sharply.  Once done, superior margin of the superior portion of the helix was fully exposed both posteriorly and anteriorly.  It was noted that there was a fracture in the superior margin of the helix that resulted in a flimsy structure and it being curled forward.  Using 4-0 PDS suture,  the fracture and the superior helical auricular cartilage was repaired posteriorly using interrupted figure-of-eight stitches.  The sutures conferred improved stability to the structure of the ear as well as unfurled the superior margin of the helix.  Next, more caudally, there was a fracture of this portion of the helical rim and there was what appeared to be a possible old graft that was protrusive.  This portion of the helix was sharply removed and used as an onlay graft for the superior margin of the helix.  This onlay graft was sewn in place using interrupted 5-0 PDS suture.  Next, the postauricular skin above the superior margin of the helix was then sewn down to mastoid fascia using 4-0 PDS suture in an attempt to create more of a sulcus.  Finally, the skin of the auricle was redraped over the superior margin of the helix and closed using 4-0 Monocryl deep dermal suture and 5-0 interrupted Prolene suture.  Next, the incision was dressed with antibiotic ointment and the patient tolerated the procedure with no issues.  Patient was transferred to the postanesthesia care unit with no events.     Postoperative plan: Clinic in 1 week for suture removal.     Juan Agudelo MD, PhD

## 2024-07-25 NOTE — ANESTHESIA CARE TRANSFER NOTE
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    Anesthesia Type:   MAC     Note:    Oropharynx: spontaneously breathing and oropharynx clear of all foreign objects  Level of Consciousness: drowsy  Oxygen Supplementation: room air    Independent Airway: airway patency satisfactory and stable  Dentition: dentition unchanged  Vital Signs Stable: post-procedure vital signs reviewed and stable  Report to RN Given: handoff report given  Patient transferred to: PACU  Comments: Resps easy and regular. Report to PACU RN  Handoff Report: Identifed the Patient, Identified the Reponsible Provider, Reviewed the pertinent medical history, Discussed the surgical course, Reviewed Intra-OP anesthesia mangement and issues during anesthesia, Set expectations for post-procedure period and Allowed opportunity for questions and acknowledgement of understanding      Vitals:  Vitals Value Taken Time   BP     Temp     Pulse 70 07/25/24 1156   Resp 23 07/25/24 1156   SpO2 95 % 07/25/24 1156   Vitals shown include unfiled device data.    Electronically Signed By: MERCEDES JENSEN CRNA  July 25, 2024  11:57 AM

## 2024-07-25 NOTE — PROGRESS NOTES
PRS    No changes in history or examination.  Medically optimized.    OR today for right revision functional otoplasty with possible use of cartilage graft from the left ear.  We also discussed possibly using some ipsilateral conchal bowl cartilage graft if needed.  We did discuss that this may not fully correct her issue, but it is worth trying.  Patient understands this and is willing to undergo the procedure.    Discussed the risks of surgery, including but not limited to: Infection, bleeding, pain, poor scarring, suboptimal aesthetic result, residual deformity, recurrence of deformity, donor site defect, wound healing issues, asymmetries, anesthesia related complication, cardiac arrest.  Despite these risks, patient consents to and would like to proceed with right functional otoplasty, possible use of a cartilage graft from the left ear.  All questions answered.    Juan Agudelo MD, PhD

## 2024-07-25 NOTE — BRIEF OP NOTE
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 Fractured helical rim cartilage.  Complications: None.  Implants: * No implants in log *        Laxmi Preston MD  Plastic Surgery PGY6

## 2024-07-25 NOTE — DISCHARGE INSTRUCTIONS
Plastic Surgery Discharge Instructions    Patient has been treated for right ear deformity with Dr. Agudelo on 7/25/2024.     Care: Please keep the dressing in place, clean and dry. Please apply antibiotic ointment to the incision twice daily. Do not apply hearing aid to the incision at this time.     Medications: You can take Ibuprofen 400-800 mg and Tylenol 650 mg for pain relief. Please take each every 6 hours, and for optimal pain relief - please stagger the medications so that you are taking one or the other every 3 hours. If you had a nerve block, the effects may last 8-12 hours. If you have been prescribed additional pain medications, please take as instructed and as needed. If you are taking additional pain medications, please do not exceed 4000 mg of Tylenol daily from all sources. Also, if you are taking narcotic medications, please do not operate heavy machinery or drive. If you have been prescribed antibiotics, please also take as instructed.     Diet: Start with clear liquids and slow down if nauseated. Advance the diet as tolerated.    Weight-bearing/Activities: No strenuous activities and do not raise your heart rate above 100 bpm for the first few weeks. Do not place hearing aid on the right ear in the first week.     ER Instructions: Please call the office and/or consider return to the ER if you experience worsening pain not relieved by medications, increased swelling, redness or high fevers >101F or if there are unexpected problems like shortness of breath.    Post-op follow-up: Clinic next week with Dr. Agudelo at the [facility]    Juan Agudelo MD, PhD     [facility]  Home Care Following Anesthesia  For 24 hours after surgery:  Get plenty of rest.  A responsible adult must stay with you for at least 24 hours after you leave the surgery center.  Do not drive or use heavy equipment.  If you have weakness or tingling, don't drive or use heavy  equipment until this feeling goes away.   Do not drink alcohol.   Avoid strenuous or risky activities.  Ask for help when climbing stairs.  You may feel lightheaded.  IF so, sit for a few minutes before standing.  Have someone help you get up.   If you have nausea (feel sick to your stomach): Drink only clear liquids such as apple juice, ginger ale, broth or 7-Up.  Rest may also help.  Be sure to drink enough fluids.  Move to a regular diet as you feel able.   You may have a slight fever.  Call the doctor if your fever is over 100 F (37.7 C) (taken under the tongue) or lasts longer than 24 hours.  You may have a dry mouth, a sore throat, muscle aches or trouble sleeping. These should go away after 24 hours.  Do not make important or legal decisions.   It is recommended to avoid smoking.               Tips for taking pain medications  To get the best pain relief possible, remember these points:  Take pain medications as directed, before pain becomes severe.  Pain medication can upset your stomach: taking it with food may help.  Constipation is a common side effect of pain medication. Drink plenty of  fluids.  Eat foods high in fiber. Take a stool softener if recommended by your doctor or pharmacist.  Do not drink alcohol, drive or operate machinery while taking pain medications.  Ask about other ways to control pain, such as with heat, ice or relaxation.    Tylenol/Acetaminophen Consumption    If you feel your pain relief is insufficient, you may take Tylenol/Acetaminophen in addition to your narcotic pain medication.   Be careful not to exceed 4,000 mg of Tylenol/Acetaminophen in a 24 hour period from all sources.  If you are taking extra strength Tylenol/acetaminophen (500 mg), the maximum dose is 8 tablets in 24 hours.  If you are taking regular strength acetaminophen (325 mg), the maximum dose is 12 tablets in 24 hours.    Call a doctor for any of the following:  Signs of infection (fever, growing tenderness at  the surgery site, a large amount of drainage or bleeding, severe pain, foul-smelling drainage, redness, swelling).  It has been over 8 to 10 hours since surgery and you are still not able to urinate (pass water).  Headache for over 24 hours.  Numbness, tingling or weakness the day after surgery (if you had spinal anesthesia).  Signs of Covid-19 infection (temperature over 100 degrees, shortness of breath, cough, loss of taste/smell, generalized body aches, persistent headache, chills, sore throat, nausea/vomiting/diarrhea)  Your doctor is:       Dr. Juan Agudelo, Plastic Surgery: [phone]               Or dial [phone] and ask for the resident on call for:  Plastics  For emergency care, call the:  [facility]:  [phone] (TTY for hearing impaired: [phone])

## 2024-07-25 NOTE — ANESTHESIA PROCEDURE NOTES
Airway       Patient location during procedure: OR  Staff -        CRNA: Anuradha Bowling APRN CRNA       Performed By: CRNAIndications and Patient Condition       Indications for airway management: harriett-procedural       Induction type:intravenous       Mask difficulty assessment: 1 - vent by mask    Final Airway Details       Final airway type: supraglottic airway    Supraglottic Airway Details        Type: LMA       Brand: LMA Unique       LMA size: 4    Post intubation assessment        Placement verified by: capnometry and chest rise        Number of attempts at approach: 1       Number of other approaches attempted: 0       Secured with: tape       Ease of procedure: easy       Dentition: Intact

## 2024-07-25 NOTE — ANESTHESIA PREPROCEDURE EVALUATION
Pre-Operative H & P     CC:  Preoperative exam to assess for increased cardiopulmonary risk while undergoing surgery and anesthesia.    Date of Encounter: 7/9/2024  Primary Care Physician:  Matilde Quiñonez     Reason for visit:   No diagnosis found.      HPI  Nadira Perez is a 74 year old female who presents for pre-operative H & P in preparation for  Procedure Information       Case: [id number] Date/Time: 07/25/24 0930    Procedure: right revision functional otoplasty, possible use of a cartilage graft from the left ear (Bilateral: Ear)    Anesthesia type: MAC with Local    Diagnosis: Congenital abnormality of ear [Q17.9]    Pre-op diagnosis: Congenital abnormality of ear [Q17.9]    Location: [place] [address] / [facility]    Providers: Juan Agudelo MD            Patient is being evaluated for comorbid conditions of h/o breast cancer, diabetes, hypertension, dyslipidemia, h/o blood transfusion, h/o PONV, hypothyroid     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  She is seen by Dr. Agudelo and she is now scheduled for the above procedure.    History is obtained from the patient and chart review    Hx of abnormal bleeding or anti-platelet use: denies    Menstrual history: No LMP recorded (lmp unknown). Patient has had a hysterectomy.     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    Thyroid disease        Past Surgical History  Past Surgical History:   Procedure Laterality Date    ABDOMEN SURGERY      ovarian cyst, mesh    ARTHRODESIS WRIST Right     ARTHRODESIS WRIST  02/14/2013    Procedure: ARTHRODESIS WRIST;  left wrist scaphoid excision, four bone fusion, iliac crest bone graft  ( Mac with block);  Surgeon: Av Mendez MD;  [facility]    BIOPSY      skin, vaginal    BLEPHAROPLASTY BILATERAL Bilateral 05/06/2022    Procedure: UPPER BLEPHAROPLASTY, BILATERAL;  Surgeon: Jemal Sanchez MD;  Location: [facility] OR    CATARACT IOL, RT/LT Right 03/13/2018    CATARACT IOL, RT/LT Left 02/20/2018    COLONOSCOPY  12/24/2013    Procedure: COMBINED COLONOSCOPY, SINGLE BIOPSY/POLYPECTOMY BY BIOPSY;  COLONOSCOPY;  Surgeon: Dom Alvarez MD;  Location: [facility]    COLONOSCOPY N/A 3/12/2024    Procedure: COLONOSCOPY, FLEXIBLE, WITH LESION REMOVAL USING SNARE;  Surgeon: Amina Espinoza MD;  Location: [facility]    COSMETIC BLEPHAROPLASTY LOWER LIDS BILATERAL Bilateral 05/06/2022    Procedure: BLEPHAROPLASTY, LOWER EYELID, BILATERAL, COSMETIC;  Surgeon: Jemal Sanchez MD;  Location: [facility] OR    COSMETIC SURGERY      ESOPHAGOSCOPY, GASTROSCOPY, DUODENOSCOPY (EGD), COMBINED N/A 11/23/2016    Procedure: COMBINED ESOPHAGOSCOPY, GASTROSCOPY, DUODENOSCOPY (EGD);  Surgeon: Quinten Feliciano MD;  Location: [facility]    ESOPHAGOSCOPY, GASTROSCOPY, DUODENOSCOPY (EGD), COMBINED N/A 3/12/2024    Procedure: ESOPHAGOGASTRODUODENOSCOPY, WITH BIOPSY;  Surgeon: Amina Espinoza MD;  Location:  GI    EXTERNAL EAR SURGERY      right    EYE SURGERY      radial keratomy    FUSION, SPINE, INTERBODY, OBLIQUE ANTERIOR AND LUMBAR, 2 LEVELS, POST APPROACH, USING OTS N/A 11/18/2021    Procedure: Part 1: Oblique Anterior Interbody Fusion at Lumbar 5 to sacral 1 with use of Bone Morphogenic Protein,;  Surgeon: Serge Ramirez MD;  Location:   OR    GI SURGERY      Umbilical hernia repair    GRAFT BONE FROM ILIAC CREST  02/14/2013    Procedure: GRAFT BONE FROM ILIAC CREST;  mac with block and local infilitration;  Surgeon: Av Mendez MD;  [facility]    HC BREATH HYDROGEN TEST N/A 10/14/2016    Procedure: HYDROGEN BREATH TEST;  Surgeon: Cheri Barron MD;  Location: [facility] GI    HERNIA REPAIR      umbilical age 18 mos.    HYSTERECTOMY TOTAL ABDOMINAL  05/03/2000    INJECT EPIDURAL CAUDAL N/A 09/12/2023    Procedure: Caudal epidural steroid injection with fluoroscopy;  Surgeon: Natasha Umaña MD;  Location: [facility] OR    INJECT EPIDURAL CAUDAL N/A 1/2/2024    Procedure: INJECTION, EPIDURAL, CAUDAL;  Surgeon: Natasha Umaña MD;  Location: [facility] OR    INJECT EPIDURAL LUMBAR N/A 05/23/2023    Procedure: L3-4 interlaminar epidural steroid injection with fluoroscopy ( left> right);  Surgeon: Natasha Umaña MD;  Location: [facility] OR    INJECT JOINT SACROILIAC Left 04/27/2023    Procedure: Left sacroiliac joint steroid injection with fluoroscopy;  Surgeon: Natasha Umaña MD;  Location: [facility] OR    MASTECTOMY MODIFIED RADICAL Bilateral     bilateral; right breast prophylactic    OPTICAL TRACKING SYSTEM FUSION POSTERIOR SPINE LUMBAR N/A 11/18/2021    Procedure: Open Posterior Instrumented Spinal Fusion at Lumbar 5 to Sacral 1, with grimm aguayo osteotomy, use of O-Arm/Stealth;  Surgeon: Serge Ramirez MD;  [facility]    PARATHYROIDECTOMY  09/23/2004    R SUPERIOR    PARATHYROIDECTOMY  09/23/2004    parathyroid resection, subtotal    RELEASE CARPAL TUNNEL Right 12/02/2021    Procedure: RIGHT CARPAL TUNNEL RELEASE, RIGHT WRIST HARDWARE REMOVAL, RIGHT CARPAL BOSS EXCISION;  Surgeon: Rolan Conley MD;  Location: [facility] OR    REMOVE HARDWARE HAND  09/24/2013    Procedure: REMOVE HARDWARE HAND;  Left Hand Screw Removal       RHINOPLASTY  1968    thyr proc skin closed cosmetic manner by subcuticular stitch  01/23/2009    THYROPLASTY   10/09/2009    TONSILLECTOMY  1977    VITRECTOMY PARSPLANA WITH 25 GAUGE SYSTEM Left 06/20/2022    Procedure: LEFT EYE VITRECTOMY, PARS PLANA APPROACH, USING 25-GAUGE INSTRUMENTS, laser;  Surgeon: Felix Carlos MD;  Location: [facility] OR    WRIST SURGERY      wrist arthrodesis       Prior to Admission Medications  Current Outpatient Medications   Medication Sig Dispense Refill    acetaminophen (TYLENOL) 500 MG tablet Take 1,000 mg by mouth 3 times daily      amLODIPine (NORVASC) 2.5 MG tablet Take 1 tablet (2.5 mg) by mouth daily 90 tablet 1    artificial tears OINT ophthalmic ointment at bedtime      Ascorbic Acid (VITAMIN C) 500 MG CHEW Take 1 tablet by mouth every morning      atorvastatin (LIPITOR) 80 MG tablet Take 1 tablet (80 mg) by mouth daily (Patient not taking: Reported on 7/10/2024) 90 tablet 3    carvedilol (COREG) 12.5 MG tablet Take 1 tablet (12.5 mg) by mouth 2 times daily (with meals) 180 tablet 3    Cholecalciferol (VITAMIN D3) 50 MCG (2000 UT) CAPS TAKE 3 CAPSULES (6,000 UNITS) BY MOUTH DAILY. (Patient taking differently: Take 6,000 Units by mouth every morning) 270 capsule 3    CRANBERRY EXTRACT PO Take 500 mg by mouth every morning      diclofenac (VOLTAREN) 1 % topical gel Apply 2 g topically 4 times daily 100 g 6    gabapentin (NEURONTIN) 300 MG capsule Take 4 capsules (1,200 mg) by mouth 3 times daily 1080 capsule 3    HEMP OIL OR EXTRACT OR OTHER CBD CANNABINOID, NOT MEDICAL CANNABIS, THC      hydrochlorothiazide (HYDRODIURIL) 25 MG tablet Take 1 tablet (25 mg) by mouth daily 90 day supply denied (Patient taking differently: Take 25 mg by mouth every morning 90 day supply denied) 30 tablet 2    levothyroxine (SYNTHROID/LEVOTHROID) 112 MCG tablet TAKE 1/2 TAB BY MOUTH EVERY DAY 45 tablet 3    lisinopril (ZESTRIL) 20 MG tablet Take 1 tablet (20 mg) by mouth 2 times daily 180 tablet 0    magnesium 250 MG tablet Take 400 mg by mouth every evening      Melatonin 10 MG TABS tablet Take 10  mg by mouth at bedtime      Multiple Vitamin (MULTI-VITAMIN) per tablet Take 1 tablet by mouth every morning      ondansetron (ZOFRAN-ODT) 4 MG ODT tab Take 1 tablet (4 mg) by mouth every 12 hours as needed for nausea 180 tablet 3    spironolactone (ALDACTONE) 25 MG tablet Take 25 mg by mouth daily at 2 pm      tiZANidine (ZANAFLEX) 4 MG tablet Take 1 tablet (4 mg) by mouth at bedtime 90 tablet 1    Turmeric 500 MG CAPS Take 500 mg by mouth every morning         Allergies  Allergies   Allergen Reactions    Erythromycin Nausea    Penicillins Hives     Around age 6 - doesn't recall full reaction, mother told her it was hives.     Has tolerated cephalsporins.        Social History  Social History     Socioeconomic History    Marital status:      Spouse name: Not on file    Number of children: Not on file    Years of education: Not on file    Highest education level: Not on file   Occupational History    Not on file   Tobacco Use    Smoking status: Never     Passive exposure: Never    Smokeless tobacco: Never   Vaping Use    Vaping status: Never Used   Substance and Sexual Activity    Alcohol use: Yes     Alcohol/week: 7.0 standard drinks of alcohol     Types: 7 Glasses of wine per week     Comment: Rare if ever    Drug use: Yes     Types: Marijuana     Comment: Smoke through water filter for chronic back pain PRN.    Sexual activity: Not Currently     Partners: Male     Birth control/protection: Post-menopausal, Female Surgical, None   Other Topics Concern     Service No    Blood Transfusions Not Asked    Caffeine Concern No    Occupational Exposure No    Hobby Hazards No    Sleep Concern No    Stress Concern No    Weight Concern No    Special Diet No    Back Care No    Exercise Yes     Comment: walks 4-6x  week for 20-30 min. each    Bike Helmet Not Asked    Seat Belt Yes    Self-Exams Yes    Parent/sibling w/ CABG, MI or angioplasty before 65F 55M? Yes     Comment: Father   Social History Narrative     .  Recently retired. 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        She lives with a renter.         She exercises 50 minutes three times a week.     Social Determinants of Health     Financial Resource Strain: Low Risk  (12/19/2023)    Financial Resource Strain     Within the past 12 months, have you or your family members you live with been unable to get utilities (heat, electricity) when it was really needed?: No   Food Insecurity: Low Risk  (12/19/2023)    Food Insecurity     Within the past 12 months, did you worry that your food would run out before you got money to buy more?: No     Within the past 12 months, did the food you bought just not last and you didn t have money to get more?: No   Transportation Needs: Low Risk  (12/19/2023)    Transportation Needs     Within the past 12 months, has lack of transportation kept you from medical appointments, getting your medicines, non-medical meetings or appointments, work, or from getting things that you need?: No   Physical Activity: Not on file   Stress: Not on file   Social Connections: Not on file   Interpersonal Safety: Low Risk  (11/30/2023)    Interpersonal Safety     Do you feel physically and emotionally safe where you currently live?: Yes     Within the past 12 months, have you been hit, slapped, kicked or otherwise physically hurt by someone?: No     Within the past 12 months, have you been humiliated or emotionally abused in other ways by your partner or ex-partner?: No   Housing Stability: Low Risk  (12/19/2023)    Housing Stability     Do you have housing? : Yes     Are you worried about losing your housing?: No       Family History  Family History   Problem Relation Age of Onset    Neurologic Disorder Mother         Anuerysm of Cerebral Artery, Dementia    Diabetes Mother     Thyroid Disease Mother         Hyperthyroidism (goiter)    Cerebrovascular Disease Mother          Hemorrhagic    Dementia Mother     Osteoporosis Mother     Hyperlipidemia Mother     Heart Disease Father         AAA    Hypertension Father     Coronary Artery Disease Father     Hyperlipidemia Father     Mental Illness Father         Schizophrenia?    Dementia Brother         hydrocephalis    Circulatory Brother         Perihperal Neurophathy    Diabetes Maternal Grandmother         Presumed Type 2    Asthma Maternal Grandmother     Chronic Obstructive Pulmonary Disease Maternal Grandfather         father    Asthma Maternal Grandfather     Diabetes Maternal Aunt         x2    Melanoma Maternal Aunt     Glaucoma Maternal Aunt     Breast Cancer Cousin     Breast Cancer Cousin     Dementia Other     Cancer Other         malignant melanoma    Hypertension Other     Hypertension Other     Cerebrovascular Disease Other     Cerebrovascular Disease Other     Obesity Other     Respiratory Other     Cancer Other     Diabetes Other     Asthma Other     Other Cancer Other         Malignant melanoma    Obesity Other     Macular Degeneration No family hx of     Kidney Disease No family hx of     Thrombosis No family hx of     Arthritis No family hx of     Depression No family hx of     Substance Abuse No family hx of     Cystic Fibrosis No family hx of     Early Death No family hx of     Coronary Artery Disease Early Onset No family hx of     Heart Failure No family hx of     Bleeding Diathesis No family hx of     Ovarian Cancer No family hx of     Uterine Cancer No family hx of     Prostate Cancer No family hx of     Colorectal Cancer No family hx of     Pancreatic Cancer No family hx of     Lung Cancer No family hx of     Autoimmune Disease No family hx of     Unknown/Adopted No family hx of     Genetic Disorder No family hx of     Bleeding Disorder No family hx of     Clotting Disorder No family hx of     Anesthesia Reaction No family hx of        Review of Systems  The complete review of systems is negative other than  noted in the HPI or here.   Anesthesia Evaluation        History of anesthetic complications       ROS/MED HX  ENT/Pulmonary:     (+)     LOLY risk factors, snores loudly, hypertension,   observed stopped breathing,                           (-) tobacco use   Neurologic:  - neg neurologic ROS  (-) no seizures and no CVA   Cardiovascular: Comment: MRI 4/2024  CONCLUSIONS:   1. Mildly reduced LV systolic function with LVEF of 50%.  2. Late gadolinium enhancement imaging shows mid inferolateral wall subendocardial delayed enhancement that  could represent a small size infarct.      (+) Dyslipidemia hypertension- -   -  - -                                 Previous cardiac testing   Echo: Date: 4/2024 Results:  Left ventricular function is decreased. The ejection fraction is 50-55%  (borderline). Mid inferolateral akinesis is present.  Global right ventricular function is normal.  No pericardial effusion is present.  Stress Test:  Date: Results:    ECG Reviewed:  Date: 6/2024 Results:  Sinus rhythm   Left axis deviation   Incomplete right bundle branch block   Minimal voltage criteria for LVH, may be normal variant   Cannot rule out Inferior infarct (cited on or before 16-FEB-2024)   Abnormal ECG     Cath:  Date: Results:   (-) taking anticoagulants/antiplatelets   METS/Exercise Tolerance: >4 METS Comment: Cycling, reformer. Denies cardiac symptoms   Hematologic:     (+)       history of blood transfusion, no previous transfusion reaction,     (-) history of blood clots   Musculoskeletal: Comment: Congenital deformity of ear  Sternal fracture in February       GI/Hepatic:     (+)      hiatal hernia,              Renal/Genitourinary:  - neg Renal ROS     Endo:     (+)  type II DM, Last HgA1c: 6.2, date: 3/2024, Not using insulin,     thyroid problem, hypothyroidism,        (-) chronic steroid usage   Psychiatric/Substance Use:  - neg psychiatric ROS     Infectious Disease:  - neg infectious disease ROS     Malignancy:  -  neg malignancy ROS     Other:            LMP  (LMP Unknown)     Physical Exam   Constitutional: Awake, alert, cooperative, no apparent distress, and appears stated age.  Eyes: Pupils equal, round and reactive to light, extra ocular muscles intact, sclera clear, conjunctiva normal.  HENT: Normocephalic, oral pharynx with moist mucus membranes, good dentition.   Respiratory: Clear to auscultation bilaterally, no crackles or wheezing.  Cardiovascular: Regular rate and rhythm, normal S1 and S2, and no murmur noted.  Carotids no bruits. 1+ LE edema. Palpable pulses to radial arteries.   GI: Normal bowel sounds, soft, non-distended, non-tender  Genitourinary:  deferred  Skin: Warm and dry.  No rashes at anticipated surgical site.   Musculoskeletal: Full ROM of neck. There is no redness, warmth, or swelling of the joints.  Neurologic: Awake, alert, oriented to name, place and time. Cranial nerves II-XII are grossly intact. Gait is normal.   Neuropsychiatric: Calm, cooperative. Normal affect.     Prior Labs/Diagnostic Studies   All labs and imaging personally reviewed     EKG/ stress test - if available please see in ROS above   Echo result w/o MOPS: Interpretation Summary Left ventricular function is decreased. The ejection fraction is 50-55%(borderline). Mid inferolateral akinesis is present.Global right ventricular function is normal.No pericardial effusion is present.           No data to display                  The patient's records and results personally reviewed by this provider.     Outside records reviewed from: Care Everywhere    LAB/DIAGNOSTIC STUDIES TODAY: A1c    Assessment    Nadira Perez is a 74 year old female seen as a PAC referral for risk assessment and optimization for anesthesia.    Plan/Recommendations  Pt will be optimized for the proposed procedure.  See below for details on the assessment, risk, and preoperative recommendations    NEUROLOGY  - No history of TIA, CVA or seizure  -Post Op  "delirium risk factors:  No risk identified    ENT  -patient reports she has been told she needs a 5mm ET tube. Last general with supraglottic airway per chart review. This procedure is scheduled under MAC with local  Mallampati: III  TM: > 3    CARDIAC  - No history of CAD and Afib  -hypertension is controlled  -patient saw cardiology for optimization with the following recommendations:   \"Overall, she is doing well without angina or heart failure. She has findings of a small inferolateral infarction but since she is not symptomatic, coronary CTA/invasive angiogram was deferred. She is on a statin and is on antihypertensives. Due to her significant lower extremity edema and the gingival hyperplasia, the amlodipine dose was reduced. Review of her recent home BP readings would allow up-titration of beta blockers.      Patient is at an acceptable risk for the upcoming minor ear surgery as long her blood pressure is at goal SBP <140 mmHg. \"  - METS (Metabolic Equivalents)  Patient performs 4 or more METS exercise without symptoms             Total Score: 0      RCRI-Low risk: Class 2 0.9% complication rate             Total Score: 1    RCRI: CHF        PULMONARY  LOLY Medium Risk             Total Score: 4    LOLY: Snores loudly    LOLY: Observed stopped breathing    LOLY: Hypertension    LOLY: Over 50 ys old    - patient reports she has an upcoming sleep study planned  - Denies asthma or inhaler use  - Tobacco History    History   Smoking Status    Never   Smokeless Tobacco    Never       GI  -hiatal hernia  PONV High Risk  Total Score: 4           1 AN PONV: Pt is Female    1 AN PONV: Patient is not a current smoker    1 AN PONV: Patient has history of PONV    1 AN PONV: Intended Post Op Opioids        /RENAL  - Baseline Creatinine WNL    ENDOCRINE    - BMI: Estimated body mass index is 29.95 kg/m  as calculated from the following:    Height as of 7/9/24: 1.651 m (5' 5\").    Weight as of 7/23/24: 81.6 kg (180 " lb).  Obesity (BMI >30)  -prediabetes. Patient request updated A1c to be completed today- order placed.     HEME  VTE Low Risk 0.26%             Total Score: 1    VTE: Greater than 59 yrs old      - No history of abnormal bleeding or antiplatelet use.      Different anesthesia methods/types have been discussed with the patient, but they are aware that the final plan will be decided by the assigned anesthesia provider on the date of service.    The patient is optimized for their procedure. AVS with information on surgery time/arrival time, meds and NPO status given by nursing staff. No further diagnostic testing indicated.      On the day of service:     Prep time: 7 minutes  Visit time: 18 minutes  Documentation time: 10 minutes  ------------------------------------------  Total time: 35 minutes      Karen Dee PA-C  Preoperative Assessment Center  [facility]  Clinic and Surgery Center  Phone: [phone]  Fax: [phone]    Physical Exam    Airway  airway exam normal      Mallampati: I       Respiratory Devices and Support         Dental       (+) Minor Abnormalities - some fillings, tiny chips      Cardiovascular   cardiovascular exam normal          Pulmonary   pulmonary exam normal                Anesthesia Plan    ASA Status:  3    NPO Status:  NPO Appropriate    Anesthesia Type: MAC.     - Reason for MAC: straight local not clinically adequate   Induction: Intravenous.   Maintenance: TIVA.        Consents    Anesthesia Plan(s) and associated risks, benefits, and realistic alternatives discussed. Questions answered and patient/representative(s) expressed understanding.     - Discussed: Risks, Benefits and Alternatives for BOTH SEDATION and the PROCEDURE were discussed     - Discussed with:  Patient            Postoperative Care    Pain management: IV analgesics, Oral pain medications, Multi-modal analgesia.   PONV prophylaxis: Ondansetron (or other 5HT-3), Dexamethasone or  Solumedrol, Background Propofol Infusion     Comments:

## 2024-07-31 ENCOUNTER — DOCUMENTATION ONLY (OUTPATIENT)
Dept: AUDIOLOGY | Facility: CLINIC | Age: 74
End: 2024-07-31

## 2024-07-31 ENCOUNTER — OFFICE VISIT (OUTPATIENT)
Dept: PLASTIC SURGERY | Facility: CLINIC | Age: 74
End: 2024-07-31
Payer: COMMERCIAL

## 2024-07-31 VITALS
SYSTOLIC BLOOD PRESSURE: 109 MMHG | OXYGEN SATURATION: 97 % | TEMPERATURE: 98.7 F | BODY MASS INDEX: 30.29 KG/M2 | HEIGHT: 65 IN | HEART RATE: 103 BPM | DIASTOLIC BLOOD PRESSURE: 73 MMHG

## 2024-07-31 DIAGNOSIS — Q17.9 CONGENITAL ABNORMALITY OF EAR: Primary | ICD-10-CM

## 2024-07-31 DIAGNOSIS — H90.3 SENSORINEURAL HEARING LOSS, BILATERAL: Primary | ICD-10-CM

## 2024-07-31 PROCEDURE — 99024 POSTOP FOLLOW-UP VISIT: CPT | Performed by: STUDENT IN AN ORGANIZED HEALTH CARE EDUCATION/TRAINING PROGRAM

## 2024-07-31 ASSESSMENT — PAIN SCALES - GENERAL: PAINLEVEL: MILD PAIN (2)

## 2024-07-31 NOTE — LETTER
7/31/2024       RE: Nadira Perez  [id number]  [place] [address]     Dear Colleague,    Thank you for referring your patient, Nadira Perez, to the [facility] [place] at [facility]. Please see a copy of my visit note below.    PRS    Doing well.  No issues.    On exam, right ear incision is well-healing with no wounds or signs of infection.  There is still expected swelling.    A/P: 74-year-old female 1 week status post right revision functional otoplasty, doing well    -Explained to the patient what was done.  We did repair the fractured superior helical cartilage which then unfurled the ear.  We also trimmed and repaired a fracture of the midportion of the helix.  Some of that cartilage was used as a graft to improve the vertical height of the ear and to try to create a ledge that eyeglasses and hearing aid can rest on.  Explained that the swelling early after surgery makes it difficult to assess whether or not we have achieved this functional improvement.  For now, patient should not wear hearing aid on the right ear.  Continue to sleep with the left side down.  Patient can apply Aquaphor to the incision twice daily.  Return to clinic in approximately 4-5 weeks for reevaluation.        Again, thank you for allowing me to participate in the care of your patient.      Sincerely,    Juan Agudelo MD

## 2024-07-31 NOTE — NURSING NOTE
"Chief Complaint   Patient presents with    Surgical Followup     1 week post-op, DOS 7/25/24.       Vitals:    07/31/24 1302   BP: 109/73   BP Location: Right arm   Patient Position: Sitting   Cuff Size: Adult Regular   Pulse: 103   Temp: 98.7  F (37.1  C)   TempSrc: Oral   SpO2: 97%   Height: 1.651 m (5' 5\")       Body mass index is 30.29 kg/m .    Patient reports mild R ear pain (2/10).    Adi Anton, EMT    "

## 2024-07-31 NOTE — PROGRESS NOTES
PRS    Doing well.  No issues.    On exam, right ear incision is well-healing with no wounds or signs of infection.  There is still expected swelling.    A/P: 74-year-old female 1 week status post right revision functional otoplasty, doing well    -Explained to the patient what was done.  We did repair the fractured superior helical cartilage which then unfurled the ear.  We also trimmed and repaired a fracture of the midportion of the helix.  Some of that cartilage was used as a graft to improve the vertical height of the ear and to try to create a ledge that eyeglasses and hearing aid can rest on.  Explained that the swelling early after surgery makes it difficult to assess whether or not we have achieved this functional improvement.  For now, patient should not wear hearing aid on the right ear.  Continue to sleep with the left side down.  Patient can apply Aquaphor to the incision twice daily.  Return to clinic in approximately 4-5 weeks for reevaluation.    Juan Agudelo MD, PhD

## 2024-08-01 DIAGNOSIS — S62.336D CLOSED DISPLACED FRACTURE OF NECK OF FIFTH METACARPAL BONE OF RIGHT HAND WITH ROUTINE HEALING, SUBSEQUENT ENCOUNTER: Primary | ICD-10-CM

## 2024-08-01 NOTE — PROGRESS NOTES
Walk-in hearing aid services on 7/31/24: The patient asked to have her hearing aids cleaned. She reports not currently using the right device due to a recent ear surgery. Both hearing aids were cleaned and the  filters and domes were replaced. Afterwards a listening check found both hearing aids to be working properly and they were returned to the patient. She was also, for the 5th time, provided with an [employer] application and contact info so she can look into insuring her hearing aids for loss. She is being charged for an out of warranty clean & check charge for today's services.    Richard Ruffin  Audiology Clinic Assistant        I reviewed the procedures/testing performed by the audiology assistant, and agree with the assessment and plan as documented.      Rik Arauz  Audiologist  [place] License  #[id number]

## 2024-08-07 ENCOUNTER — OFFICE VISIT (OUTPATIENT)
Dept: ORTHOPEDICS | Facility: CLINIC | Age: 74
End: 2024-08-07
Payer: COMMERCIAL

## 2024-08-07 ENCOUNTER — ANCILLARY PROCEDURE (OUTPATIENT)
Dept: GENERAL RADIOLOGY | Facility: CLINIC | Age: 74
End: 2024-08-07
Attending: FAMILY MEDICINE
Payer: COMMERCIAL

## 2024-08-07 ENCOUNTER — MYC MEDICAL ADVICE (OUTPATIENT)
Dept: DERMATOLOGY | Facility: CLINIC | Age: 74
End: 2024-08-07

## 2024-08-07 DIAGNOSIS — S62.336D CLOSED DISPLACED FRACTURE OF NECK OF FIFTH METACARPAL BONE OF RIGHT HAND WITH ROUTINE HEALING, SUBSEQUENT ENCOUNTER: Primary | ICD-10-CM

## 2024-08-07 DIAGNOSIS — S62.336D CLOSED DISPLACED FRACTURE OF NECK OF FIFTH METACARPAL BONE OF RIGHT HAND WITH ROUTINE HEALING, SUBSEQUENT ENCOUNTER: ICD-10-CM

## 2024-08-07 PROCEDURE — 99213 OFFICE O/P EST LOW 20 MIN: CPT | Mod: 25 | Performed by: FAMILY MEDICINE

## 2024-08-07 PROCEDURE — 29075 APPL CST ELBW FNGR SHORT ARM: CPT | Mod: RT | Performed by: FAMILY MEDICINE

## 2024-08-07 PROCEDURE — 73130 X-RAY EXAM OF HAND: CPT | Mod: RT | Performed by: RADIOLOGY

## 2024-08-07 NOTE — TELEPHONE ENCOUNTER
Spoke with patient and scheduled her for 9/03/2024. She was given the appointment details    Odalis Oneal LPN

## 2024-08-07 NOTE — LETTER
8/7/2024      RE: Nadira Perez  [id number]  [facility] [address]     Dear Colleague,    Thank you for referring your patient, Nadira Perez, to the [facility] [place]. Please see a copy of my visit note below.    HISTORY OF PRESENT ILLNESS  Ms. Perez is a pleasant 74 year old female following up with a 5th metacarpal fracture in her right hand.  Ismael has been doing relatively well, she has gotten quite a bit better with regards to her pain.  She notices a bump now that the cast is off.  No new events or concerns.     PHYSICAL EXAM  General  - normal appearance, in no obvious distress    Musculoskeletal - right hand, 5th finger  - inspection: Prominence at dorsal fifth metacarpal at the neck  - palpation: no bony or soft tissue tenderness  - ROM: No malrotation  Neuro  - no numbness, no motor deficit, grossly normal coordination, normal muscle tone  Skin  - no ecchymosis, erythema, warmth, or induration, no obvious rash      ASSESSMENT & PLAN  Ms. Perez is a 74 year old female following up with a 5th metacarpal fracture in her right hand.    I ordered & independently reviewed an xray of her right hand, this redemonstrates her fracture of the head and neck junction at the 5th metacarpal.  There is no further displacement.    We did replace her cast today as her healing is inadequate, but definitely moving in the right direction.  She should follow-up in 2 weeks with a repeat x-ray, out of the cast.  If she is doing great at that visit we should discuss transitioning out of the cast, possibly with a wrist brace to protect her metacarpal.  We also could consider hand therapy at that point.    It was a pleasure seeing Nadira.        Willis Morgan DO, CAQSM      This note was constructed using Dragon dictation software, please excuse any minor errors in spelling, grammar, or syntax.      Cast removal:    Relevant Diagnosis: 5th MC fx Right    Patient educated on  cast removal process: Yes     Ulnar gutter cast was removed per physician instruction.    Skin was observed and found to be intact with no signs of concern:Yes     Concern noted: None     Person(s) involved in removal:   Patient     Questions asked: None    Patient sent to x-ray: Yes        Relevant Diagnosis: 5th MC fx, right    Ulnar Gutter application and care    Type of Cast applied: Ulnar Gutter   Cast/Splinting material used: Fiberglass    Person(s) involved in teaching:   Patient     Motivation Level:  Asks Questions: Yes  Eager to Learn: Yes  Cooperative: Yes  Receptive (willing/able to accept information): Yes     Patient demonstrates understanding of the following:   Reason for the appointment, diagnosis and treatment plan: Yes    Which situations necessitate calling provider and whom to contact: Yes     Teaching Concerns Addressed:   Comments: None     Proper use and care of Ulnar Gutter cast: Yes  Pain management techniques: Yes  Wound Care: Yes  How and/when to access community resources: Yes     Cast was applied in standard Manner:  Yes  Cast fit well:  Yes  Patient reports cast to fit comfortably:  Yes    Instructional Materials Used/Given: None, already given previously      Cast/splint application    Date/Time: 8/7/2024 2:44 PM    Performed by: Fausto Browning ATC  Authorized by: Willis Morgan DO    Consent:     Consent obtained:  Verbal    Consent given by:  Patient    Risks discussed:  Discoloration, numbness, pain and swelling  Pre-procedure details:     Sensation:  Normal  Procedure details:     Laterality:  Right    Location:  Hand    Hand:  R hand    Cast type:  Ulnar gutter    Supplies:  Fiberglass  Post-procedure details:     Pain:  Unchanged    Pain level:  0/10    Sensation:  Normal    Patient tolerance of procedure:  Tolerated well, no immediate complications  Comments:        Fausto Browning M.A., LAT, ATC  Certified Athletic Trainer                   Again, thank you for allowing me to  participate in the care of your patient.      Sincerely,    Willis Morgan, DO

## 2024-08-07 NOTE — PROGRESS NOTES
HISTORY OF PRESENT ILLNESS  Ms. Perez is a pleasant 74 year old female following up with a 5th metacarpal fracture in her right hand.  Ismael has been doing relatively well, she has gotten quite a bit better with regards to her pain.  She notices a bump now that the cast is off.  No new events or concerns.     PHYSICAL EXAM  General  - normal appearance, in no obvious distress    Musculoskeletal - right hand, 5th finger  - inspection: Prominence at dorsal fifth metacarpal at the neck  - palpation: no bony or soft tissue tenderness  - ROM: No malrotation  Neuro  - no numbness, no motor deficit, grossly normal coordination, normal muscle tone  Skin  - no ecchymosis, erythema, warmth, or induration, no obvious rash      ASSESSMENT & PLAN  Ms. Peerz is a 74 year old female following up with a 5th metacarpal fracture in her right hand.    I ordered & independently reviewed an xray of her right hand, this redemonstrates her fracture of the head and neck junction at the 5th metacarpal.  There is no further displacement.    We did replace her cast today as her healing is inadequate, but definitely moving in the right direction.  She should follow-up in 2 weeks with a repeat x-ray, out of the cast.  If she is doing great at that visit we should discuss transitioning out of the cast, possibly with a wrist brace to protect her metacarpal.  We also could consider hand therapy at that point.    It was a pleasure seeing Nadira.        Willis Morgan DO, [facility]      This note was constructed using Dragon dictation software, please excuse any minor errors in spelling, grammar, or syntax.      Cast removal:    Relevant Diagnosis: 5th MC fx Right    Patient educated on cast removal process: Yes     Ulnar gutter cast was removed per physician instruction.    Skin was observed and found to be intact with no signs of concern:Yes     Concern noted: None     Person(s) involved in removal:   Patient     Questions asked: None    Patient  sent to x-ray: Yes        Relevant Diagnosis: 5th MC fx, right    Ulnar Gutter application and care    Type of Cast applied: Ulnar Gutter   Cast/Splinting material used: Fiberglass    Person(s) involved in teaching:   Patient     Motivation Level:  Asks Questions: Yes  Eager to Learn: Yes  Cooperative: Yes  Receptive (willing/able to accept information): Yes     Patient demonstrates understanding of the following:   Reason for the appointment, diagnosis and treatment plan: Yes    Which situations necessitate calling provider and whom to contact: Yes     Teaching Concerns Addressed:   Comments: None     Proper use and care of Ulnar Gutter cast: Yes  Pain management techniques: Yes  Wound Care: Yes  How and/when to access community resources: Yes     Cast was applied in standard Manner:  Yes  Cast fit well:  Yes  Patient reports cast to fit comfortably:  Yes    Instructional Materials Used/Given: None, already given previously      Cast/splint application    Date/Time: 8/7/2024 2:44 PM    Performed by: Fausto Browning ATC  Authorized by: Willis Morgan DO    Consent:     Consent obtained:  Verbal    Consent given by:  Patient    Risks discussed:  Discoloration, numbness, pain and swelling  Pre-procedure details:     Sensation:  Normal  Procedure details:     Laterality:  Right    Location:  Hand    Hand:  R hand    Cast type:  Ulnar gutter    Supplies:  Fiberglass  Post-procedure details:     Pain:  Unchanged    Pain level:  0/10    Sensation:  Normal    Patient tolerance of procedure:  Tolerated well, no immediate complications  Comments:        Fausto Browning M.A., LAT, ATC  Certified Athletic Trainer

## 2024-08-16 ENCOUNTER — ONCOLOGY VISIT (OUTPATIENT)
Dept: ONCOLOGY | Facility: CLINIC | Age: 74
End: 2024-08-16
Attending: STUDENT IN AN ORGANIZED HEALTH CARE EDUCATION/TRAINING PROGRAM
Payer: COMMERCIAL

## 2024-08-16 ENCOUNTER — MYC MEDICAL ADVICE (OUTPATIENT)
Dept: PHARMACY | Facility: CLINIC | Age: 74
End: 2024-08-16

## 2024-08-16 ENCOUNTER — OFFICE VISIT (OUTPATIENT)
Dept: PHARMACY | Facility: CLINIC | Age: 74
End: 2024-08-16
Payer: COMMERCIAL

## 2024-08-16 VITALS
WEIGHT: 179.6 LBS | BODY MASS INDEX: 29.89 KG/M2 | DIASTOLIC BLOOD PRESSURE: 80 MMHG | SYSTOLIC BLOOD PRESSURE: 127 MMHG | OXYGEN SATURATION: 96 % | RESPIRATION RATE: 16 BRPM | HEART RATE: 72 BPM | TEMPERATURE: 99.3 F

## 2024-08-16 DIAGNOSIS — G62.9 PERIPHERAL POLYNEUROPATHY: Chronic | ICD-10-CM

## 2024-08-16 DIAGNOSIS — I89.0 LYMPHEDEMA: ICD-10-CM

## 2024-08-16 DIAGNOSIS — K21.9 GASTROESOPHAGEAL REFLUX DISEASE, UNSPECIFIED WHETHER ESOPHAGITIS PRESENT: ICD-10-CM

## 2024-08-16 DIAGNOSIS — M21.371 RIGHT FOOT DROP: ICD-10-CM

## 2024-08-16 DIAGNOSIS — I10 BENIGN ESSENTIAL HYPERTENSION: Primary | ICD-10-CM

## 2024-08-16 DIAGNOSIS — C50.919 MALIGNANT NEOPLASM OF BREAST IN FEMALE, ESTROGEN RECEPTOR POSITIVE, UNSPECIFIED LATERALITY, UNSPECIFIED SITE OF BREAST (H): Primary | ICD-10-CM

## 2024-08-16 DIAGNOSIS — Z17.0 MALIGNANT NEOPLASM OF BREAST IN FEMALE, ESTROGEN RECEPTOR POSITIVE, UNSPECIFIED LATERALITY, UNSPECIFIED SITE OF BREAST (H): Primary | ICD-10-CM

## 2024-08-16 PROCEDURE — G0463 HOSPITAL OUTPT CLINIC VISIT: HCPCS | Performed by: STUDENT IN AN ORGANIZED HEALTH CARE EDUCATION/TRAINING PROGRAM

## 2024-08-16 PROCEDURE — 99215 OFFICE O/P EST HI 40 MIN: CPT | Performed by: STUDENT IN AN ORGANIZED HEALTH CARE EDUCATION/TRAINING PROGRAM

## 2024-08-16 PROCEDURE — 99606 MTMS BY PHARM EST 15 MIN: CPT | Performed by: PHARMACIST

## 2024-08-16 PROCEDURE — 99607 MTMS BY PHARM ADDL 15 MIN: CPT | Performed by: PHARMACIST

## 2024-08-16 ASSESSMENT — SLEEP AND FATIGUE QUESTIONNAIRES
HOW LIKELY ARE YOU TO NOD OFF OR FALL ASLEEP WHEN YOU ARE A PASSENGER IN A CAR FOR AN HOUR WITHOUT A BREAK: WOULD NEVER DOZE
HOW LIKELY ARE YOU TO NOD OFF OR FALL ASLEEP WHILE WATCHING TV: SLIGHT CHANCE OF DOZING
HOW LIKELY ARE YOU TO NOD OFF OR FALL ASLEEP WHILE SITTING AND READING: SLIGHT CHANCE OF DOZING
HOW LIKELY ARE YOU TO NOD OFF OR FALL ASLEEP IN A CAR, WHILE STOPPED FOR A FEW MINUTES IN TRAFFIC: WOULD NEVER DOZE
HOW LIKELY ARE YOU TO NOD OFF OR FALL ASLEEP WHILE SITTING AND TALKING TO SOMEONE: WOULD NEVER DOZE
HOW LIKELY ARE YOU TO NOD OFF OR FALL ASLEEP WHILE SITTING INACTIVE IN A PUBLIC PLACE: WOULD NEVER DOZE
HOW LIKELY ARE YOU TO NOD OFF OR FALL ASLEEP WHILE LYING DOWN TO REST IN THE AFTERNOON WHEN CIRCUMSTANCES PERMIT: MODERATE CHANCE OF DOZING
HOW LIKELY ARE YOU TO NOD OFF OR FALL ASLEEP WHILE SITTING QUIETLY AFTER LUNCH WITHOUT ALCOHOL: SLIGHT CHANCE OF DOZING

## 2024-08-16 ASSESSMENT — PAIN SCALES - GENERAL: PAINLEVEL: MODERATE PAIN (4)

## 2024-08-16 NOTE — PROGRESS NOTES
[facility]   PM&R clinic note        Interval history:     Nadira Perez presents to clinic today for follow up reg her rehab needs.   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  Recommendations included:  Work-up: chest xray 2 views to evaluate for sternal/rib fractures after fall  Therapy/equipment/braces:   Continue using right AFO.  Continue using walker for all transfers and ambulation.  Referral / follow up with other providers:   Continue to follow-up with pain management team.  Follow up: 3 months.    Since her last visit,   7/25/24- Had right revision otoplasty done with Dr. Agudelo.  8/7/24- Saw Dr. Morgan with Orthopedics. Xray of right hand re demonstrates fracture at 5th metacarpal. Replaced cast, plan for follow up in 2 weeks with repeat xray. If doing well at that visit, possibly transitioning out of cast into wrist brace to protect metacarpal and possible hand therapy at that time.      Symptoms,  Ismael was seen for a return visit today.  Her BP has been elevated in the morning, so she needed to adjust her medications and her BP is now better with adjustments.  She notes with her right hand injury, she was trying to get up from her bed and her fifth finger slid on the outside of the edge of a box which was heavy and she sustained a fracture.   She has been doing better with her balance, and has continued using her walker which helps. She restarted her pilates reformer and is being careful not to overdo it. She still wears her AFO on the right.   Her bilateral lower extremity edema is a lot better. She was questioning whether it was her amlodipine was causing leg swelling. She has stopped her amlodopine.  She is wearing daily compression garments, and just received new garments in August.  She tries to wear them all day long when she is up and active.  She has noticed increased atrophy  over the left anterior shin area.  She recently received new compression garments and they are fitting well.   In terms of pain management, she stopped the epidurals. Her left and right SI joints are aggravated, right is starting to get worse now. In general tylenol and neurontin are working well. She has not had any sciatic symptoms.   She states that she has a sleep study scheduled next week.      Therapies/HEP,  Was participating in outpatient physical therapy and lymphedema therapy.  Is planning to try to reschedule with outpatient physical therapy to continue to work on strength and balance.      Functionally,   No changes since last visit.      Social history is unchanged.      Medications:  Current Outpatient Medications   Medication Sig Dispense Refill    acetaminophen (TYLENOL) 500 MG tablet Take 1,000 mg by mouth 3 times daily      amLODIPine (NORVASC) 2.5 MG tablet Take 1 tablet (2.5 mg) by mouth daily 90 tablet 1    artificial tears OINT ophthalmic ointment at bedtime      Ascorbic Acid (VITAMIN C) 500 MG CHEW Take 1 tablet by mouth every morning      atorvastatin (LIPITOR) 80 MG tablet Take 1 tablet (80 mg) by mouth daily 90 tablet 3    carvedilol (COREG) 12.5 MG tablet Take 1 tablet (12.5 mg) by mouth 2 times daily (with meals) 180 tablet 3    Cholecalciferol (VITAMIN D3) 50 MCG (2000 UT) CAPS TAKE 3 CAPSULES (6,000 UNITS) BY MOUTH DAILY. (Patient taking differently: Take 6,000 Units by mouth every morning) 270 capsule 3    CRANBERRY EXTRACT PO Take 500 mg by mouth every morning      diclofenac (VOLTAREN) 1 % topical gel Apply 2 g topically 4 times daily 100 g 6    doxycycline hyclate (VIBRAMYCIN) 100 MG capsule Take 1 capsule (100 mg) by mouth 2 times daily 6 capsule 0    gabapentin (NEURONTIN) 300 MG capsule Take 4 capsules (1,200 mg) by mouth 3 times daily 1080 capsule 3    HEMP OIL OR EXTRACT OR OTHER CBD CANNABINOID, NOT MEDICAL CANNABIS, THC      hydrochlorothiazide (HYDRODIURIL) 25 MG tablet  Take 1 tablet (25 mg) by mouth daily 90 day supply denied (Patient taking differently: Take 25 mg by mouth every morning 90 day supply denied) 30 tablet 2    levothyroxine (SYNTHROID/LEVOTHROID) 112 MCG tablet TAKE 1/2 TAB BY MOUTH EVERY DAY 45 tablet 3    lisinopril (ZESTRIL) 20 MG tablet Take 1 tablet (20 mg) by mouth 2 times daily 180 tablet 0    magnesium 250 MG tablet Take 400 mg by mouth every evening      Melatonin 10 MG TABS tablet Take 10 mg by mouth at bedtime      Multiple Vitamin (MULTI-VITAMIN) per tablet Take 1 tablet by mouth every morning      ondansetron (ZOFRAN) 4 MG tablet Take 1 tablet (4 mg) by mouth every 6 hours as needed for nausea (Patient not taking: Reported on 7/31/2024) 12 tablet 0    ondansetron (ZOFRAN-ODT) 4 MG ODT tab Take 1 tablet (4 mg) by mouth every 12 hours as needed for nausea 180 tablet 3    spironolactone (ALDACTONE) 25 MG tablet Take 25 mg by mouth daily at 2 pm      tiZANidine (ZANAFLEX) 4 MG tablet Take 1 tablet (4 mg) by mouth at bedtime 90 tablet 1    Turmeric 500 MG CAPS Take 500 mg by mouth every morning                Physical Exam:   LMP  (LMP Unknown)   Gen: NAD, pleasant and cooperative   HEENT: extra-ocular muscles intact, normocepalic, without obvious abnormality, skin normal  Pulm: non-labored breathing in room air  Ext: WWP, no edema in BLE, no tenderness in calves. Small nodule on anterior right shin, fluctuates, no erythema or increase in temperature noted.  Some notable atrophy over tibialis anterior just distal to nodular swelling over anterior right shin.  Neuro/MSK: alert, oriented, BUE strength 5/5, BLE 5/5 except for right dorsiflexion 3/5 and left ankle dorsiflexion/first digit extension at 4+/5.  TTP over SI joints, left worse than right.       Labs/Imaging:  Lab Results   Component Value Date    WBC 10.3 02/17/2024    HGB 11.9 02/17/2024    HCT 37.3 02/17/2024    MCV 91 02/17/2024     02/17/2024     Lab Results   Component Value Date      05/03/2024    POTASSIUM 3.7 07/23/2024    CHLORIDE 101 05/03/2024    CO2 26 05/03/2024     (H) 07/25/2024     Lab Results   Component Value Date    GFRESTIMATED 84 07/23/2024    GFRESTBLACK >90 05/07/2021    GFRESTBLACK >90 05/07/2021     Lab Results   Component Value Date    AST 25 11/30/2023    ALT 21 11/30/2023    ALKPHOS 91 11/30/2023    BILITOTAL 0.2 10/27/2022    BILICONJ 0.0 11/08/2012     Lab Results   Component Value Date    INR 0.85 (L) 08/20/2010     Lab Results   Component Value Date    BUN 24.1 (H) 05/03/2024    CR 0.75 07/23/2024              Assessment/Plan   Nadira Perez presents to clinic today for follow up reg her rehab needs.   She has h/o left stage IIIC inflammatory breast cancer (ER/CA+ve, HER2-ve)    Was last seen in clinic on 2/15/24.  Multiple rehabilitation considerations were discussed with Ismael at today's visit.  Overall, she is doing well and slightly better in terms of her mobility and exercise regimen at home as well as her balance.  She is exhibiting some continued weakness with bilateral ankle dorsiflexion, right greater than left and some notable muscle atrophy.  She also sustained a fall which caused a fracture of her right fifth digit.  She would benefit from outpatient physical therapy for targeted strengthening and work on balance/gait.  In addition, she should follow-up with orthopedic surgery as planned for repeat imaging and plan to transition to a splint with possible hand therapy for her right hand.  Lastly, she is struggling with some SI joint pain and has had previous SI joint injections a while ago and would like to try repeat injections for relief.  A referral was placed for this.  We will plan a return visit in 6 to 8 months.  She is in agreement with this plan.    Therapy/equipment/braces:   Continue using right AFO.  Continue using walker for all transfers and ambulation.  Outpatient physical therapy referral placed for targeted strengthening and work  on balance/gait.  Continue daily compression garments for bilateral lower extremity lymphedema.  Garment should be worn when up and active during the day, and can be taken off at night for rest.  Lymphedema is stable.  Will reassess the need for lymphedema therapy at next visit.  Referral / follow up with other providers:   Continue to follow-up with pain management team.  Referral placed for SI joint injections bilaterally for pain relief.  Follow up: 3 months.      Carmen Centeno MD  Physical Medicine & Rehabilitation      50 minutes spent on the date of the encounter doing chart review, history and exam, documentation and further activities as noted above.

## 2024-08-16 NOTE — PATIENT INSTRUCTIONS
"Recommendations from today's MTM visit:                                                    MTM (medication therapy management) is a service provided by a clinical pharmacist designed to help you get the most of out of your medicines.   Today we reviewed what your medicines are for, how to know if they are working, that your medicines are safe and how to make your medicine regimen as easy as possible.    Can discuss with cardiologist to increase spironolactone for blood pressure or for a 24 hours blood pressure cuff.   Try over the counter famotidine for heart burn    Follow-up: Return in about 3 months (around 11/7/2024) for Follow up.    It was great speaking with you today.  I value your experience and would be very thankful for your time in providing feedback in our clinic survey. In the next few days, you may receive an email or text message from [employer] with a link to a survey related to your  clinical pharmacist.\"     To schedule another MTM appointment, please call the clinic directly or you may call the MTM scheduling line at [phone] or toll-free at [phone].     My Clinical Pharmacist's contact information:                                                      Please feel free to contact me with any questions or concerns you have.      Willis So, Pharm. D., BCACP  Medication Therapy Management Pharmacist    "

## 2024-08-16 NOTE — NURSING NOTE
"Oncology Rooming Note    August 16, 2024 11:50 AM   Nadira Perez is a 74 year old female who presents for:    Chief Complaint   Patient presents with    Oncology Clinic Visit     Lumbosacral Radiculopathy     Initial Vitals: /80 (BP Location: Right arm, Patient Position: Sitting, Cuff Size: Adult Regular)   Pulse 72   Temp 99.3  F (37.4  C) (Oral)   Resp 16   Wt 81.5 kg (179 lb 9.6 oz)   LMP  (LMP Unknown)   SpO2 96%   BMI 29.89 kg/m   Estimated body mass index is 29.89 kg/m  as calculated from the following:    Height as of 7/31/24: 1.651 m (5' 5\").    Weight as of this encounter: 81.5 kg (179 lb 9.6 oz). Body surface area is 1.93 meters squared.  Moderate Pain (4) Comment: Data Unavailable   No LMP recorded (lmp unknown). Patient has had a hysterectomy.  Allergies reviewed: Yes  Medications reviewed: Yes    Medications: Medication refills not needed today.  Pharmacy name entered into [employer]:    [facility]  [facility] [address] [place] [address]  [address]    Frailty Screening:   Is the patient here for a new oncology consult visit in cancer care? 2. No      Clinical concerns: Pain in Sacroiliac joints.        Brenda Masterson LPN  8/16/2024              "

## 2024-08-16 NOTE — LETTER
8/16/2024      Nadira Perez  [address]  [facility] [address]      Dear Colleague,    Thank you for referring your patient, Nadira Perez, to the [facility]. Please see a copy of my visit note below.    [facility]   PM&R clinic note        Interval history:     Nadira Perez presents to clinic today for follow up reg her rehab needs.   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  Recommendations included:  Work-up: chest xray 2 views to evaluate for sternal/rib fractures after fall  Therapy/equipment/braces:   Continue using right AFO.  Continue using walker for all transfers and ambulation.  Referral / follow up with other providers:   Continue to follow-up with pain management team.  Follow up: 3 months.    Since her last visit,   7/25/24- Had right revision otoplasty done with Dr. Agudelo.  8/7/24- Saw Dr. Morgan with Orthopedics. Xray of right hand re demonstrates fracture at 5th metacarpal. Replaced cast, plan for follow up in 2 weeks with repeat xray. If doing well at that visit, possibly transitioning out of cast into wrist brace to protect metacarpal and possible hand therapy at that time.      Symptoms,  Ismael was seen for a return visit today.  Her BP has been elevated in the morning, so she needed to adjust her medications and her BP is now better with adjustments.  She notes with her right hand injury, she was trying to get up from her bed and her fifth finger slid on the outside of the edge of a box which was heavy and she sustained a fracture.   She has been doing better with her balance, and has continued using her walker which helps. She restarted her pilates reformer and is being careful not to overdo it. She still wears her AFO on the right.   Her bilateral lower extremity edema is a lot better. She was questioning whether it was her  amlodipine was causing leg swelling. She has stopped her amlodopine.  She is wearing daily compression garments, and just received new garments in August.  She tries to wear them all day long when she is up and active.  She has noticed increased atrophy over the left anterior shin area.  She recently received new compression garments and they are fitting well.   In terms of pain management, she stopped the epidurals. Her left and right SI joints are aggravated, right is starting to get worse now. In general tylenol and neurontin are working well. She has not had any sciatic symptoms.   She states that she has a sleep study scheduled next week.      Therapies/HEP,  Was participating in outpatient physical therapy and lymphedema therapy.  Is planning to try to reschedule with outpatient physical therapy to continue to work on strength and balance.      Functionally,   No changes since last visit.      Social history is unchanged.      Medications:  Current Outpatient Medications   Medication Sig Dispense Refill     acetaminophen (TYLENOL) 500 MG tablet Take 1,000 mg by mouth 3 times daily       amLODIPine (NORVASC) 2.5 MG tablet Take 1 tablet (2.5 mg) by mouth daily 90 tablet 1     artificial tears OINT ophthalmic ointment at bedtime       Ascorbic Acid (VITAMIN C) 500 MG CHEW Take 1 tablet by mouth every morning       atorvastatin (LIPITOR) 80 MG tablet Take 1 tablet (80 mg) by mouth daily 90 tablet 3     carvedilol (COREG) 12.5 MG tablet Take 1 tablet (12.5 mg) by mouth 2 times daily (with meals) 180 tablet 3     Cholecalciferol (VITAMIN D3) 50 MCG (2000 UT) CAPS TAKE 3 CAPSULES (6,000 UNITS) BY MOUTH DAILY. (Patient taking differently: Take 6,000 Units by mouth every morning) 270 capsule 3     CRANBERRY EXTRACT PO Take 500 mg by mouth every morning       diclofenac (VOLTAREN) 1 % topical gel Apply 2 g topically 4 times daily 100 g 6     doxycycline hyclate (VIBRAMYCIN) 100 MG capsule Take 1 capsule (100 mg) by  mouth 2 times daily 6 capsule 0     gabapentin (NEURONTIN) 300 MG capsule Take 4 capsules (1,200 mg) by mouth 3 times daily 1080 capsule 3     HEMP OIL OR EXTRACT OR OTHER CBD CANNABINOID, NOT MEDICAL CANNABIS, THC       hydrochlorothiazide (HYDRODIURIL) 25 MG tablet Take 1 tablet (25 mg) by mouth daily 90 day supply denied (Patient taking differently: Take 25 mg by mouth every morning 90 day supply denied) 30 tablet 2     levothyroxine (SYNTHROID/LEVOTHROID) 112 MCG tablet TAKE 1/2 TAB BY MOUTH EVERY DAY 45 tablet 3     lisinopril (ZESTRIL) 20 MG tablet Take 1 tablet (20 mg) by mouth 2 times daily 180 tablet 0     magnesium 250 MG tablet Take 400 mg by mouth every evening       Melatonin 10 MG TABS tablet Take 10 mg by mouth at bedtime       Multiple Vitamin (MULTI-VITAMIN) per tablet Take 1 tablet by mouth every morning       ondansetron (ZOFRAN) 4 MG tablet Take 1 tablet (4 mg) by mouth every 6 hours as needed for nausea (Patient not taking: Reported on 7/31/2024) 12 tablet 0     ondansetron (ZOFRAN-ODT) 4 MG ODT tab Take 1 tablet (4 mg) by mouth every 12 hours as needed for nausea 180 tablet 3     spironolactone (ALDACTONE) 25 MG tablet Take 25 mg by mouth daily at 2 pm       tiZANidine (ZANAFLEX) 4 MG tablet Take 1 tablet (4 mg) by mouth at bedtime 90 tablet 1     Turmeric 500 MG CAPS Take 500 mg by mouth every morning                Physical Exam:   LMP  (LMP Unknown)   Gen: NAD, pleasant and cooperative   HEENT: extra-ocular muscles intact, normocepalic, without obvious abnormality, skin normal  Pulm: non-labored breathing in room air  Ext: WWP, no edema in BLE, no tenderness in calves. Small nodule on anterior right shin, fluctuates, no erythema or increase in temperature noted.  Some notable atrophy over tibialis anterior just distal to nodular swelling over anterior right shin.  Neuro/MSK: alert, oriented, BUE strength 5/5, BLE 5/5 except for right dorsiflexion 3/5 and left ankle dorsiflexion/first digit  extension at 4+/5.  TTP over SI joints, left worse than right.       Labs/Imaging:  Lab Results   Component Value Date    WBC 10.3 02/17/2024    HGB 11.9 02/17/2024    HCT 37.3 02/17/2024    MCV 91 02/17/2024     02/17/2024     Lab Results   Component Value Date     05/03/2024    POTASSIUM 3.7 07/23/2024    CHLORIDE 101 05/03/2024    CO2 26 05/03/2024     (H) 07/25/2024     Lab Results   Component Value Date    GFRESTIMATED 84 07/23/2024    GFRESTBLACK >90 05/07/2021    GFRESTBLACK >90 05/07/2021     Lab Results   Component Value Date    AST 25 11/30/2023    ALT 21 11/30/2023    ALKPHOS 91 11/30/2023    BILITOTAL 0.2 10/27/2022    BILICONJ 0.0 11/08/2012     Lab Results   Component Value Date    INR 0.85 (L) 08/20/2010     Lab Results   Component Value Date    BUN 24.1 (H) 05/03/2024    CR 0.75 07/23/2024              Assessment/Plan   Nadira Perez presents to clinic today for follow up reg her rehab needs.   She has h/o left stage IIIC inflammatory breast cancer (ER/DC+ve, HER2-ve)    Was last seen in clinic on 2/15/24.  Multiple rehabilitation considerations were discussed with Ismael at today's visit.  Overall, she is doing well and slightly better in terms of her mobility and exercise regimen at home as well as her balance.  She is exhibiting some continued weakness with bilateral ankle dorsiflexion, right greater than left and some notable muscle atrophy.  She also sustained a fall which caused a fracture of her right fifth digit.  She would benefit from outpatient physical therapy for targeted strengthening and work on balance/gait.  In addition, she should follow-up with orthopedic surgery as planned for repeat imaging and plan to transition to a splint with possible hand therapy for her right hand.  Lastly, she is struggling with some SI joint pain and has had previous SI joint injections a while ago and would like to try repeat injections for relief.  A referral was placed for this.   We will plan a return visit in 6 to 8 months.  She is in agreement with this plan.    Therapy/equipment/braces:   Continue using right AFO.  Continue using walker for all transfers and ambulation.  Outpatient physical therapy referral placed for targeted strengthening and work on balance/gait.  Continue daily compression garments for bilateral lower extremity lymphedema.  Garment should be worn when up and active during the day, and can be taken off at night for rest.  Lymphedema is stable.  Will reassess the need for lymphedema therapy at next visit.  Referral / follow up with other providers:   Continue to follow-up with pain management team.  Referral placed for SI joint injections bilaterally for pain relief.  Follow up: 3 months.      Carmen Centeno MD  Physical Medicine & Rehabilitation      50 minutes spent on the date of the encounter doing chart review, history and exam, documentation and further activities as noted above.               Again, thank you for allowing me to participate in the care of your patient.        Sincerely,        Carmen Centeno MD

## 2024-08-16 NOTE — PATIENT INSTRUCTIONS
It was nice to see you again today.    1.  Continue using your right AFO.  2.  Continue using your walker for all transfers and ambulation.  3.  A referral to physical therapy was placed for targeted strengthening and continue to work on your gait and balance.  4.  A referral for SI joint injections was placed today.  5.  Continue to follow-up with your pain management team as needed.  6.  Follow-up with your orthopedic surgery team as planned to transition from cast to splint for your right hand and for hand therapy when appropriate.  7.  Follow-up with Dr. Centeno in 6 to 8 months.

## 2024-08-16 NOTE — PROGRESS NOTES
Medication Therapy Management (MTM) Encounter    ASSESSMENT:                            Medication Adherence/Access: No issues identified    Hypertension:   Patient is meeting blood pressure goal of < 130/80mmHg. Since she is worried about some of her blood pressure readings being high she could consider a 24 hour blood pressure cuff for more perspective. Could also consider an increase in spironolactone if needed.    GERD:   Since PPI can have risks for mineral absorption adversely effecting osteoporosis she should try an H2 blocker to help with her symptoms.     PLAN:                            Can discuss with cardiologist to increase spironolactone for blood pressure or for a 24 hours blood pressure cuff.   Try over the counter famotidine for heart burn    Follow-up: Return in about 3 months (around 11/7/2024) for Follow up.    SUBJECTIVE/OBJECTIVE:                          Ismael Perez is a 74 year old female seen for a follow-up visit.       Reason for visit: 3 month follow-up.    Allergies/ADRs: Reviewed in chart  Past Medical History: Reviewed in chart  Tobacco: She reports that she has never smoked. She has never been exposed to tobacco smoke. She has never used smokeless tobacco.  Alcohol: rare use - hasn't had a drink this year    Medication Adherence/Access: no issues reported patient declined blood pressure and weight due to getting it earlier in the day    Hypertension   Amlodipine is on hold due to edema which has improved  Lisinopril 20 mg twice a day   Carvedilol 12.5 mg twice a day  Spironolactone 25 mg daily in the evening - no nocturia  Hydrochlorothiazide 25 mg once daily     Patient self monitors blood pressure multiple times per day, best blood pressure per day listed below but will also have higher ones during.   This morning her highest reading is 160/98 mmHg.   Reports she has been getting pulse in the 60's bpm. She is most concerned about her blood pressure in the mornings.   Date BP    8/16 110/74   8/15 121/86   8/14 135/87   8/13 117/83   8/12 123/87   8/11 125/93   8/10 105/74        BP Readings from Last 3 Encounters:   08/16/24 127/80   07/31/24 109/73   07/25/24 (!) 184/97       Pulse Readings from Last 3 Encounters:   08/16/24 72   07/31/24 103   07/25/24 62              GERD:   Stopped omepraozole likely due to osteoporosis risk  Still having symptoms    Today's Vitals: LMP  (LMP Unknown)  Declined by patient. See onocology visit from earlier today  ----------------      I spent 30 minutes with this patient today. All changes were made via collaborative practice agreement with MERCEDES Kyle CNP. A copy of the visit note was provided to the patient's provider(s).    A summary of these recommendations was given to the patient.    Willis So, Pharm. D., BCACP  Medication Therapy Management Pharmacist           Medication Therapy Recommendations  Gastroesophageal reflux disease, unspecified whether esophagitis present    Rationale: Untreated condition - Needs additional medication therapy - Indication   Recommendation: Start Medication - FAMOTIDINE PO   Status: Patient Agreed - Adherence/Education

## 2024-08-20 ENCOUNTER — THERAPY VISIT (OUTPATIENT)
Dept: SLEEP MEDICINE | Facility: CLINIC | Age: 74
End: 2024-08-20
Payer: COMMERCIAL

## 2024-08-20 ENCOUNTER — DOCUMENTATION ONLY (OUTPATIENT)
Dept: SLEEP MEDICINE | Facility: CLINIC | Age: 74
End: 2024-08-20

## 2024-08-20 DIAGNOSIS — G47.9 SLEEP DISTURBANCE: ICD-10-CM

## 2024-08-20 DIAGNOSIS — S62.336D CLOSED DISPLACED FRACTURE OF NECK OF FIFTH METACARPAL BONE OF RIGHT HAND WITH ROUTINE HEALING, SUBSEQUENT ENCOUNTER: Primary | ICD-10-CM

## 2024-08-20 PROCEDURE — 95810 POLYSOM 6/> YRS 4/> PARAM: CPT | Performed by: INTERNAL MEDICINE

## 2024-08-21 NOTE — NURSING NOTE
Diagnostic PSG completed per provider order.  Patient did not meet criteria for PAP therapy for a split night study.

## 2024-08-21 NOTE — PATIENT INSTRUCTIONS
[facility]    1. Your sleep study will be reviewed by a sleep physician within the next few days.     2. Please follow up in the sleep clinic as scheduled, or, make an appointment with your sleep provider to be seen within two weeks to discuss the results of the sleep study.    3. If you have any questions or problems with your treatment plan, please contact your sleep clinic provider at [phone] to further manage your condition.    4. Please review your attached medication list, and, at your follow-up appointment advise your sleep clinic provider about any changes.    5. Go to [email or web address] for more information about your sleep problems.    KATERINA Carmen  August 21, 2024

## 2024-08-22 DIAGNOSIS — M53.3 SACROILIAC JOINT PAIN: Primary | ICD-10-CM

## 2024-08-23 ENCOUNTER — TELEPHONE (OUTPATIENT)
Dept: PHYSICAL MEDICINE AND REHAB | Facility: CLINIC | Age: 74
End: 2024-08-23

## 2024-08-23 ENCOUNTER — ANCILLARY PROCEDURE (OUTPATIENT)
Dept: GENERAL RADIOLOGY | Facility: CLINIC | Age: 74
End: 2024-08-23
Attending: FAMILY MEDICINE
Payer: COMMERCIAL

## 2024-08-23 ENCOUNTER — OFFICE VISIT (OUTPATIENT)
Dept: ORTHOPEDICS | Facility: CLINIC | Age: 74
End: 2024-08-23
Payer: COMMERCIAL

## 2024-08-23 DIAGNOSIS — S62.336D CLOSED DISPLACED FRACTURE OF NECK OF FIFTH METACARPAL BONE OF RIGHT HAND WITH ROUTINE HEALING, SUBSEQUENT ENCOUNTER: ICD-10-CM

## 2024-08-23 DIAGNOSIS — S62.336D CLOSED DISPLACED FRACTURE OF NECK OF FIFTH METACARPAL BONE OF RIGHT HAND WITH ROUTINE HEALING, SUBSEQUENT ENCOUNTER: Primary | ICD-10-CM

## 2024-08-23 PROCEDURE — 99213 OFFICE O/P EST LOW 20 MIN: CPT | Performed by: FAMILY MEDICINE

## 2024-08-23 PROCEDURE — 73130 X-RAY EXAM OF HAND: CPT | Mod: RT | Performed by: RADIOLOGY

## 2024-08-23 NOTE — PROGRESS NOTES
Cast removal:    Relevant Diagnosis: 5th MC fx    Patient educated on cast removal process: Yes     Ulnar gutter cast was removed per physician instruction.    Skin was observed and found to be intact with no signs of concern:Yes     Concern noted: None     Person(s) involved in removal:   Patient     Questions asked: None    Patient sent to x-ray: Yes      Fausto Browning M.A., LAT, ATC  Certified Athletic Trainer

## 2024-08-23 NOTE — TELEPHONE ENCOUNTER
Phoned the patient and left VM. Stated to call and schedule injection procedure with dr. Saldivar at [phone].

## 2024-08-23 NOTE — PROGRESS NOTES
ESTABLISHED PATIENT FOLLOW-UP:  Follow Up of the Right Hand       HISTORY OF PRESENT ILLNESS  Ms. Perez is a pleasant 74 year old year old female who presents to clinic today for follow-up of right 5th MC fx    Date of injury: 7/10/24  Date last seen: 8/7/24 Dr. Willis Morgan  Following Therapeutic Plan:  Cast  Pain: Improved  Function: Improved  Interval History: Doing well overall. She notes stiffness of her fifth finger but no pain.  Denies numbness or tingling.      Additional medical/Social/Surgical histories reviewed in [facility] and updated as appropriate.    REVIEW OF SYSTEMS (8/23/2024)  CONSTITUTIONAL: Denies fever and weight loss  GASTROINTESTINAL: Denies abdominal pain, nausea, vomiting  MUSCULOSKELETAL: See HPI  SKIN: Denies any recent rash or lesion  NEUROLOGICAL: Denies numbness or focal weakness     PHYSICAL EXAM  LMP  (LMP Unknown)     General  - normal appearance, in no obvious distress  Musculoskeletal - right hand, 5th finger  - inspection: Prominence at dorsal fifth metacarpal at the neck  - palpation: no bony or soft tissue tenderness  - ROM: No malrotation of digits. Able to flex fifth digit MCP to approximately 60 degrees without difficulty or pain.    Neuro  - no numbness, no motor deficit, grossly normal coordination, normal muscle tone  Skin  - no ecchymosis, erythema, warmth, or induration, no obvious rash    IMAGING : XR right hand 3 views. Final results and radiologist's interpretation, available in the [facility] health record. Images were reviewed with the patient/family members in the office today. My personal interpretation of the performed imaging is ongoing healing of fifth metacarpal neck fracture with increased callus formation and decreased fracture lucency although still remains visible.      ASSESSMENT & PLAN  Ms. Perez is a 74 year old year old female who presents to clinic today with Follow Up of the Right Hand    Fifth metacarpal neck fracture suffered 6 weeks and 2 days ago with  increased callus formation today.      Diagnosis:  (K38.203P) Closed displaced fracture of neck of fifth metacarpal bone of right hand with routine healing, subsequent encounter  (primary encounter diagnosis)    -Cast removal today  -Wrist brace provided for stability of metacarpal with use of her walker.  May dorian tape fourth and fifth fingers if pain arises  -Hand therapy referral was made after discussion of stiffness and options to improve range of motion  -Follow up Dr. Otto Morgan in 4 weeks if stiffness, pain persists for repeat xray at that time.    It was a pleasure seeing Nadira.    Willis Borjas DO, CAM  Primary Care Sports Medicine

## 2024-08-23 NOTE — LETTER
8/23/2024      RE: Nadira Perez  [address]  [facility] [address]     Dear Colleague,    Thank you for referring your patient, Nadira Perez, to the [facility] [place]. Please see a copy of my visit note below.    ESTABLISHED PATIENT FOLLOW-UP:  Follow Up of the Right Hand       HISTORY OF PRESENT ILLNESS  Ms. Perez is a pleasant 74 year old year old female who presents to clinic today for follow-up of right 5th MC fx    Date of injury: 7/10/24  Date last seen: 8/7/24 Dr. Willis Morgan  Following Therapeutic Plan:  Cast  Pain: Improved  Function: Improved  Interval History: Doing well overall. She notes stiffness of her fifth finger but no pain.  Denies numbness or tingling.      Additional medical/Social/Surgical histories reviewed in [facility] and updated as appropriate.    REVIEW OF SYSTEMS (8/23/2024)  CONSTITUTIONAL: Denies fever and weight loss  GASTROINTESTINAL: Denies abdominal pain, nausea, vomiting  MUSCULOSKELETAL: See HPI  SKIN: Denies any recent rash or lesion  NEUROLOGICAL: Denies numbness or focal weakness     PHYSICAL EXAM  LMP  (LMP Unknown)     General  - normal appearance, in no obvious distress  Musculoskeletal - right hand, 5th finger  - inspection: Prominence at dorsal fifth metacarpal at the neck  - palpation: no bony or soft tissue tenderness  - ROM: No malrotation of digits. Able to flex fifth digit MCP to approximately 60 degrees without difficulty or pain.    Neuro  - no numbness, no motor deficit, grossly normal coordination, normal muscle tone  Skin  - no ecchymosis, erythema, warmth, or induration, no obvious rash    IMAGING : XR right hand 3 views. Final results and radiologist's interpretation, available in the [facility] health record. Images were reviewed with the patient/family members in the office today. My personal interpretation of the performed imaging is ongoing healing of fifth metacarpal neck fracture with increased callus  formation and decreased fracture lucency although still remains visible.      ASSESSMENT & PLAN  Ms. Perez is a 74 year old year old female who presents to clinic today with Follow Up of the Right Hand    Fifth metacarpal neck fracture suffered 6 weeks and 2 days ago with increased callus formation today.      Diagnosis:  (S62.336D) Closed displaced fracture of neck of fifth metacarpal bone of right hand with routine healing, subsequent encounter  (primary encounter diagnosis)    -Cast removal today  -Wrist brace provided for stability of metacarpal with use of her walker.  May dorian tape fourth and fifth fingers if pain arises  -Hand therapy referral was made after discussion of stiffness and options to improve range of motion  -Follow up Dr. Otto Morgan in 4 weeks if stiffness, pain persists for repeat xray at that time.    It was a pleasure seeing Nadira.    Willis Borjas DO, [facility]  Primary Care Sports Medicine      Cast removal:    Relevant Diagnosis: 5th  fx    Patient educated on cast removal process: Yes     Ulnar gutter cast was removed per physician instruction.    Skin was observed and found to be intact with no signs of concern:Yes     Concern noted: None     Person(s) involved in removal:   Patient     Questions asked: None    Patient sent to x-ray: Yes      Fausto Browning M.A., LAT, ATC  Certified Athletic Trainer        Again, thank you for allowing me to participate in the care of your patient.      Sincerely,    Willis Borjas DO

## 2024-08-27 ENCOUNTER — TELEPHONE (OUTPATIENT)
Dept: PHYSICAL MEDICINE AND REHAB | Facility: CLINIC | Age: 74
End: 2024-08-27
Payer: COMMERCIAL

## 2024-08-27 PROBLEM — M53.3 SACROILIAC JOINT PAIN: Status: ACTIVE | Noted: 2024-08-22

## 2024-08-27 NOTE — TELEPHONE ENCOUNTER
Called patient to schedule procedure with Dr. Saldivar    Date of Procedure: 10/2/24    Arrival time given: Yes: Arrival Time 10:30am       Procedure Location: [facility]     Verified Location with Patient:  Yes  Address provided to the patient     Pre-op H&P Required:  No: Local anesthesia        Post-Op/Follow Up Appt:  Yes: follow up ~4-6wks with referring physician: Dr. Centeno        Informed patient they will need a  to drive them home:  Yes    Patients : Spouse     Patient is aware that pre-op RN from the procedure center will call 2-3 days prior to scheduled procedure to confirm arrival time and review any instructions:  Yes       Additional Comments: N/A        Dory Bowman MA on 8/27/2024 at 1:59 PM      P: [phone]*

## 2024-09-01 ASSESSMENT — PAIN SCALES - PAIN ENJOYMENT GENERAL ACTIVITY SCALE (PEG)
INTERFERED_GENERAL_ACTIVITY: 3
PEG_TOTALSCORE: 3
INTERFERED_ENJOYMENT_LIFE: 3
AVG_PAIN_PASTWEEK: 3

## 2024-09-03 ENCOUNTER — OFFICE VISIT (OUTPATIENT)
Dept: DERMATOLOGY | Facility: CLINIC | Age: 74
End: 2024-09-03
Payer: COMMERCIAL

## 2024-09-03 DIAGNOSIS — L82.0 INFLAMED SEBORRHEIC KERATOSIS: ICD-10-CM

## 2024-09-03 DIAGNOSIS — D22.9 MULTIPLE BENIGN MELANOCYTIC NEVI: Primary | ICD-10-CM

## 2024-09-03 DIAGNOSIS — L82.1 SK (SEBORRHEIC KERATOSIS): ICD-10-CM

## 2024-09-03 DIAGNOSIS — D18.01 CHERRY ANGIOMA: ICD-10-CM

## 2024-09-03 PROCEDURE — 17110 DESTRUCTION B9 LES UP TO 14: CPT | Mod: GC | Performed by: DERMATOLOGY

## 2024-09-03 PROCEDURE — 99213 OFFICE O/P EST LOW 20 MIN: CPT | Mod: 25 | Performed by: DERMATOLOGY

## 2024-09-03 ASSESSMENT — PAIN SCALES - GENERAL: PAINLEVEL: NO PAIN (0)

## 2024-09-03 NOTE — LETTER
9/3/2024       RE: Nadira Perez  [id number]  [facility] [address]     Dear Colleague,    Thank you for referring your patient, Nadira Perez, to the [facility] [place] at [facility]. Please see a copy of my visit note below.    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 Lipoma vs vascular - ultrasound ordered 9/2023, probable subcutaneous hematoma or swelling to the patient's area of concern over the right leg measuring up to 2.3 cm   ____________________________________________    Assessment & Plan:    # FBSE 09/03/24   - Benign findings: seborrheic keratosis, cherry angioma, benign melanocytic nevi   The nature of sun-induced photo-aging and skin cancers is discussed.  Sun avoidance, protective clothing, and the use of 30-SPF sunscreens is advised. Observe closely for skin damage/changes, and call if such occurs.   - reassured patient regarding the benign nature of these lesions.  - reviewed sun protection measures (i.e. protective clothing and broad spectrum sunscreen).   - ABCDEs of melaoma reviewed      # History of SCC of perineum 1980   - NTD: No further management at this time.    # Hx breast cancer, s/p radiation   - no recurrence, radiation changes stable     # iSK , right chest   - LN2 09/03/24      Procedures Performed:   - Cryotherapy procedure note, location(s): right chest. After verbal consent and discussion of risks and benefits including, but not limited to, dyspigmentation/scar, blister, and pain, 1 lesion(s) was(were) treated with 1-2 mm freeze border for 1-2 cycles with liquid nitrogen. Post cryotherapy instructions were provided.    Follow-up: 1 year(s) in-person, or earlier for new or changing lesions    Staff and resident:   Dr. Wilkerson and Lorena Alarcon DO     Staff Physician Comments:   I saw and evaluated the patient with the resident and I agree with the assessment and plan.  I was present for the entire minor procedure and examination. I have made edits if needed.    Abe Wilkerson MD  Staff Dermatologist and Dermatopathologist  , Department of Dermatology   ____________________________________________    CC: Derm Problem (FBSE- raised lesions in groin area)    HPI:  Ms. Nadira Perez is a(n) 74 year old female who presents today as a return patient for FBSE.  - denies any lesions of concern today, has noticed some new brown spots on bilateral inner thighs, does not report any bleeding, crusting or oozing spots    - uses sunscreen and wears sun protective clothing: yes, avoids the sun as able   - history of skin cancer: yes - as above   - family history of melanoma: no   - no other changes to health, no fever, chills or other systemic symptoms     Patient is otherwise feeling well, without additional skin concerns.    Labs Reviewed:  N/A    Physical Exam:  Vitals: LMP  (LMP Unknown)   SKIN: Total skin excluding the undergarment areas was performed. The exam included the head/face, neck, both arms, chest, back, abdomen, both legs, digits and/or nails.   - There are dome shaped bright red papules on the trunk and extremities   - There is a skin colored fleshy papule(s) located on the trunk  - Multiple regular brown pigmented macules and papules are identified  on the trunk and extremities  - Scattered brown macules on sun exposed areas   - There are waxy stuck on tan to brown papules on the trunk and extremities   - inflamed SK on right chest   - Scattered telangiectasias over the chest    - No concerning features with dermoscopy   - No other lesions of concern on areas examined.     Medications:  Current Outpatient Medications   Medication Sig Dispense Refill     acetaminophen (TYLENOL) 500 MG tablet Take 1,000 mg by mouth 3 times daily       artificial tears OINT ophthalmic ointment at bedtime       Ascorbic Acid (VITAMIN C) 500 MG CHEW Take 1 tablet by mouth every morning       atorvastatin (LIPITOR) 80 MG tablet Take 1 tablet (80 mg) by mouth daily 90 tablet 3     carvedilol (COREG) 12.5 MG tablet Take 1 tablet (12.5 mg) by mouth 2 times daily (with meals) 180 tablet 3     Cholecalciferol (VITAMIN D3) 50 MCG (2000 UT) CAPS TAKE 3 CAPSULES (6,000 UNITS) BY MOUTH DAILY. (Patient taking differently: Take 6,000 Units by mouth every morning.) 270 capsule 3     CRANBERRY EXTRACT PO Take 500 mg by mouth every morning       diclofenac (VOLTAREN) 1 % topical gel Apply 2 g topically 4 times daily 100 g 6     gabapentin (NEURONTIN) 300 MG capsule Take 4 capsules (1,200 mg) by mouth 3 times daily 1080 capsule 3     HEMP OIL OR EXTRACT OR OTHER CBD CANNABINOID, NOT MEDICAL CANNABIS, THC       hydrochlorothiazide (HYDRODIURIL) 25 MG tablet Take 1 tablet (25 mg) by mouth daily 90 day supply denied 30 tablet 2     levothyroxine (SYNTHROID/LEVOTHROID) 112 MCG tablet TAKE 1/2 TAB BY MOUTH EVERY DAY 45 tablet 3     lisinopril (ZESTRIL) 20 MG tablet Take 1 tablet (20 mg) by mouth 2 times daily 180 tablet 0     MAGNESIUM GLYCINATE PO Take 480 mg by mouth daily       Melatonin 10 MG TABS tablet Take 10 mg by mouth at bedtime       Multiple Vitamin (MULTI-VITAMIN) per tablet Take 1 tablet by mouth every morning       ondansetron (ZOFRAN) 4 MG tablet Take 1 tablet (4 mg) by mouth every 6  hours as needed for nausea 12 tablet 0     ondansetron (ZOFRAN-ODT) 4 MG ODT tab Take 1 tablet (4 mg) by mouth every 12 hours as needed for nausea 180 tablet 3     spironolactone (ALDACTONE) 25 MG tablet Take 25 mg by mouth daily at 2 pm       tiZANidine (ZANAFLEX) 4 MG tablet Take 1 tablet (4 mg) by mouth at bedtime 90 tablet 1     Turmeric 500 MG CAPS Take 500 mg by mouth every morning       Current Facility-Administered Medications   Medication Dose Route Frequency Provider Last Rate Last Admin     romosozumab-aqqg (EVENITY) injection 210 mg  210 mg Subcutaneous Q30 Days Cortney Clark MD   210 mg at 04/12/23 1307     triamcinolone (KENALOG-40) injection 40 mg  40 mg   Rolan Conley MD   40 mg at 02/20/24 2046      Past Medical History:   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     Hypopotassemia     Hyperlipidemia     Murmurs     Nephrolithiasis     Osteoarthrosis, hand     Personal history of other malignant neoplasm of skin     Inflamed seborrheic keratosis     Essential hypertension, benign     Age-related osteoporosis without current pathological fracture     Mechanical problems with limbs     Hypovitaminosis D     Contact dermatitis and other eczema, due to unspecified cause     Dermatitis     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     Corneal opacity     Hypothyroidism     Osteopenia, unspecified location     Aromatase inhibitor use     Chronic pain of right knee     DDD (degenerative disc disease), lumbar     Degenerative scoliosis in adult patient     Carpal tunnel syndrome of right wrist     Peripheral neuropathy     Spinal stenosis of lumbar region with neurogenic claudication     Spondylolisthesis of lumbar region     Brain lesion     Dermatochalasis of both upper eyelids     S/P spinal fusion     Chronic bilateral low back pain     PVD (posterior vitreous detachment), left eye     Vitreous opacities  of left eye     Closed nondisplaced fracture of lateral malleolus of left fibula, initial encounter     Acute left ankle pain     Sacroiliitis (H24)     Lumbar radiculopathy     Lumbosacral radiculopathy     Diabetes mellitus, type 2 (H)     Abnormal electrocardiogram     Ejection fraction < 50%     Closed fracture of sternum, unspecified portion of sternum, initial encounter     Congenital abnormality of ear     Sacroiliac joint pain     Past Medical History:   Diagnosis Date     Arthritis      Bone disease      Breast cancer (H)      Diabetes (H)      H/O kyphoplasty      Hearing problem      History of blood transfusion      History of kidney stones      History of radiation therapy      Hyperlipemia      Hypertension      Hypopotassemia      Irregular heart beat      Kidney problem      Lymph edema      Medullary sponge kidney      Osteopenia      PONV (postoperative nausea and vomiting)      Reduced vision      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      Again, thank you for allowing me to participate in the care of your patient.      Sincerely,    Abe Wilkerson MD

## 2024-09-03 NOTE — PROGRESS NOTES
[facility] Dermatology Note  Encounter Date: Sep 3, 2024  Office Visit     Dermatology Problem List:  # FBSE 09/03/24   - Benign findings: seborrheic keratosis, cherry angioma, benign melanocytic nevi   # Hx SCC of the perineum, tx w/ excision and radiation in 1980  # iSK's - multiple over chest back and face, and legs, s/p cryo 09/03/24   # Tinea pedis w/ onychomycosis - Terbinafine cream  # Seborrheic dermatitis  - 2% ketoconazole shampoo, alternate with Head and Shoulders  # Hand dermatitis, triamcinolone 0.1% cream PRN  # Hx breast cancer, s/p radiation   - no recurrence, radiation changes stable   # Subcutaneous nodule, right shin. Lipoma vs vascular - ultrasound ordered 9/2023, probable subcutaneous hematoma or swelling to the patient's area of concern over the right leg measuring up to 2.3 cm   ____________________________________________    Assessment & Plan:    # FBSE 09/03/24   - Benign findings: seborrheic keratosis, cherry angioma, benign melanocytic nevi   The nature of sun-induced photo-aging and skin cancers is discussed.  Sun avoidance, protective clothing, and the use of 30-SPF sunscreens is advised. Observe closely for skin damage/changes, and call if such occurs.   - reassured patient regarding the benign nature of these lesions.  - reviewed sun protection measures (i.e. protective clothing and broad spectrum sunscreen).   - ABCDEs of melaoma reviewed      # History of SCC of perineum 1980   - NTD: No further management at this time.    # Hx breast cancer, s/p radiation   - no recurrence, radiation changes stable     # iSK , right chest   - LN2 09/03/24     Procedures Performed:   - Cryotherapy procedure note, location(s): right chest. After verbal consent and discussion of risks and benefits including, but not limited to, dyspigmentation/scar, blister, and pain, 1 lesion(s) was(were) treated with 1-2 mm freeze border for 1-2 cycles with liquid nitrogen. Post cryotherapy  instructions were provided.    Follow-up: 1 year(s) in-person, or earlier for new or changing lesions    Staff and resident:   Dr. Wilkerson and Lorena Alarcon DO     Staff Physician Comments:   I saw and evaluated the patient with the resident and I agree with the assessment and plan.  I was present for the entire minor procedure and examination. I have made edits if needed.    Abe Wilkerson MD  Staff Dermatologist and Dermatopathologist  , Department of Dermatology   ____________________________________________    CC: Derm Problem (FBSE- raised lesions in groin area)    HPI:  Ms. Nadira Perez is a(n) 74 year old female who presents today as a return patient for FBSE.  - denies any lesions of concern today, has noticed some new brown spots on bilateral inner thighs, does not report any bleeding, crusting or oozing spots    - uses sunscreen and wears sun protective clothing: yes, avoids the sun as able   - history of skin cancer: yes - as above   - family history of melanoma: no   - no other changes to health, no fever, chills or other systemic symptoms     Patient is otherwise feeling well, without additional skin concerns.    Labs Reviewed:  N/A    Physical Exam:  Vitals: LMP  (LMP Unknown)   SKIN: Total skin excluding the undergarment areas was performed. The exam included the head/face, neck, both arms, chest, back, abdomen, both legs, digits and/or nails.   - There are dome shaped bright red papules on the trunk and extremities   - There is a skin colored fleshy papule(s) located on the trunk  - Multiple regular brown pigmented macules and papules are identified on the trunk and extremities  - Scattered brown macules on sun exposed areas   - There are waxy stuck on tan to brown papules on the trunk and extremities   - inflamed SK on right chest   - Scattered telangiectasias over the chest    - No concerning features with dermoscopy   - No other lesions of concern on areas  examined.     Medications:  Current Outpatient Medications   Medication Sig Dispense Refill    acetaminophen (TYLENOL) 500 MG tablet Take 1,000 mg by mouth 3 times daily      artificial tears OINT ophthalmic ointment at bedtime      Ascorbic Acid (VITAMIN C) 500 MG CHEW Take 1 tablet by mouth every morning      atorvastatin (LIPITOR) 80 MG tablet Take 1 tablet (80 mg) by mouth daily 90 tablet 3    carvedilol (COREG) 12.5 MG tablet Take 1 tablet (12.5 mg) by mouth 2 times daily (with meals) 180 tablet 3    Cholecalciferol (VITAMIN D3) 50 MCG (2000 UT) CAPS TAKE 3 CAPSULES (6,000 UNITS) BY MOUTH DAILY. (Patient taking differently: Take 6,000 Units by mouth every morning.) 270 capsule 3    CRANBERRY EXTRACT PO Take 500 mg by mouth every morning      diclofenac (VOLTAREN) 1 % topical gel Apply 2 g topically 4 times daily 100 g 6    gabapentin (NEURONTIN) 300 MG capsule Take 4 capsules (1,200 mg) by mouth 3 times daily 1080 capsule 3    HEMP OIL OR EXTRACT OR OTHER CBD CANNABINOID, NOT MEDICAL CANNABIS, THC      hydrochlorothiazide (HYDRODIURIL) 25 MG tablet Take 1 tablet (25 mg) by mouth daily 90 day supply denied 30 tablet 2    levothyroxine (SYNTHROID/LEVOTHROID) 112 MCG tablet TAKE 1/2 TAB BY MOUTH EVERY DAY 45 tablet 3    lisinopril (ZESTRIL) 20 MG tablet Take 1 tablet (20 mg) by mouth 2 times daily 180 tablet 0    MAGNESIUM GLYCINATE PO Take 480 mg by mouth daily      Melatonin 10 MG TABS tablet Take 10 mg by mouth at bedtime      Multiple Vitamin (MULTI-VITAMIN) per tablet Take 1 tablet by mouth every morning      ondansetron (ZOFRAN) 4 MG tablet Take 1 tablet (4 mg) by mouth every 6 hours as needed for nausea 12 tablet 0    ondansetron (ZOFRAN-ODT) 4 MG ODT tab Take 1 tablet (4 mg) by mouth every 12 hours as needed for nausea 180 tablet 3    spironolactone (ALDACTONE) 25 MG tablet Take 25 mg by mouth daily at 2 pm      tiZANidine (ZANAFLEX) 4 MG tablet Take 1 tablet (4 mg) by mouth at bedtime 90 tablet 1     Turmeric 500 MG CAPS Take 500 mg by mouth every morning       Current Facility-Administered Medications   Medication Dose Route Frequency Provider Last Rate Last Admin    romosozumab-aqqg (EVENITY) injection 210 mg  210 mg Subcutaneous Q30 Days Cortney Clark MD   210 mg at 04/12/23 1307    triamcinolone (KENALOG-40) injection 40 mg  40 mg   Rolan Conley MD   40 mg at 02/20/24 2046      Past Medical History:   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    Corneal opacity    Hypothyroidism    Osteopenia, unspecified location    Aromatase inhibitor use    Chronic pain of right knee    DDD (degenerative disc disease), lumbar    Degenerative scoliosis in adult patient    Carpal tunnel syndrome of right wrist    Peripheral neuropathy    Spinal stenosis of lumbar region with neurogenic claudication    Spondylolisthesis of lumbar region    Brain lesion    Dermatochalasis of both upper eyelids    S/P spinal fusion    Chronic bilateral low back pain    PVD (posterior vitreous detachment), left eye    Vitreous opacities of left eye    Closed nondisplaced fracture of lateral malleolus of left fibula, initial encounter    Acute left ankle pain    Sacroiliitis (H24)    Lumbar radiculopathy    Lumbosacral radiculopathy    Diabetes mellitus, type 2 (H)    Abnormal electrocardiogram    Ejection fraction < 50%    Closed fracture of sternum, unspecified portion of sternum, initial  encounter    Congenital abnormality of ear    Sacroiliac joint pain     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

## 2024-09-03 NOTE — PATIENT INSTRUCTIONS
"Cryotherapy Instructions     For the areas treated with liquid nitrogen (cryotherapy or freezing) today, they are expected to get pink, puffy, and perhaps even blister. The area should then crust up and fall off and the goal is to have nice new skin underneath. There is nothing special that you need to do for these areas. You can wash them as you do normal skin.     Sometimes a blister develops; if a blister does develop do NOT pop it. However, if it breaks open on its own, be sure to wash it with soap and water daily and put plain vasaline or petroleum jelly and a bandaid on it until the skin is healed.     Please call the clinic if you have any questions or concerns.      Sun Protection      Sunscreen   What does \"broad spectrum mean\"?  Broad spectrum sunscreens protect against both UVA and UVB radiation. UVC is filtered out by the ozone layer.     What does SPF mean?   SPF stands for  Sun Protection Factor  and represents the ability to screen only UVB (burning) rays. UVB rays are mostly blocked in all sunscreens, but only those that contain titanium dioxide, zinc oxide, mexoryl or Parsol 1789 (avobenzone) block the UVA spectrum. Even though a sunscreen is labeled  UVA/UVB Protection  that is not entirely accurate because products that only partially protect against UVA can claim to protect against both UVA and UVB.     What SPF should I chose?   Aim to get a sunscreen that is at least sun protection factor (SPF) 30. SPF 15 provides about 92-93% coverage, SPF 30 about 95-97% coverage, and SPF 45 about 98% coverage. That is to say, SPF 30 is not twice as good as SPF 15. The reason why we recommend SPF 30 is because we are usually only putting on half the necessary amount of sunscreen to achieve the advertised protection. That means that it is very possible that your SPF 30 sunscreen is only providing you with SPF 15 coverage based on how much you are applying. SPF 15 (92-93% coverage) is the absolute minimal that " we recommend. Similarly, the benefit of sunscreens with SPF higher than 50 is that even if you put on less than the required amount, you are likely still getting good protection (ex: even if you apply only half the recommended amount of , it should still provide you with an SPF of 50).     How much should I apply?  If covering your whole body, you should be using 30 grams, or one ounce, which is how much is in one shot glass! That s a THICK layer! May times, you are only applying half the recommended amount, which means that you are only getting half the SPF (for example, you may be using SPF 30 but if you're only applying half the recommended amount, you're only getting SPF 15 protection.      When do I need to wear sunscreen?  Every day, rain or shine! Even on a rainy day or a day when you are only indoors, you are still being exposed harmful UV radiation from the sun. We usually recommend physical/mineral sunscreens (active ingredient is titanium dioxide or zinc oxide) as these ingredients have been around for many years, there is no concern of them being absorbed into the bloodstream, and they are coral reef friendly! However, the best sunscreen is the one that you will use everyday.     What about my kids?  Sunscreen is not recommended for infants under the age of 6 months. Use clothing, shade and sun avoidance for small infants. For kids older than 6 months, we recommend that you should use only mineral/physical sunscreens that have zinc oxide as the active ingredient. Sun-protective clothing and hats are also important for people of all ages.     Sun protective clothing and Resources   Coolibar (www.coolibar.com)  GreenCage Security End (www.Connesta.com)  Athleta (www.athleta.Secondbrain)  Thingy Club (www.iHydroRun.Secondbrain)  Carve Designs (Mobilepolice) - affordable  Skinz (ParktskTidal.com)    Long sleeve - Ihsan Cool DRI UPF 50 or Knox PFG UPF 50  Hoodie - Knox PFG UPF 50  Swimshirt/Rash Guard - Evelin UPF 50 (on  Amazon)  Neck - Outdoor Research Ubertubes (www.outdoorresPlot Projects.Novatek)      Do I need tinted sunscreen?  There is more and more research showing that visible light can also lead to discoloration (such as melasma). Tinted sunscreens (which contain iron oxides) protect against visible light as an added bonus.     What brands do you recommend?  Physical/Mineral Sunscreens (in no particular order)  Elta MD UV Physical Broad-Spectrum SPF 41 Sunscreen (Tinted, $33)  Skin Ceuticals Physical Fusion UV Defense SPF 50 (Tinted, $34)  Unsun Mineral Tinted Face Sunscreen (Tinted, with 2 shade ranges, $29)  It Cosmetics CC+ Cream with SPF 50+ (Tinted, can also double as foundation/coverage -- great range of shades, $40)  Biossance Squalane + Zinc Sheer Mineral Sunscreen SPF 30 PA +++ (goes on white then blends in, $30)  Cerave 100% Mineral Sunscreen SPF 50 Face (good for sensitive skin, $15)  La Roche Posay Anthelios Mineral Zinc Oxide Sunscreen SPF 50 ($35)  La Roche Posay Anthelios Mineral Tinted Sunscreen for Face SPF 50 ($35)  Think Sport Sunscreen (great for sports, though has more of a white cast, $20)  Think Baby Sunscreen (for kids, $21)  Color Science Sunforgettable Total Protection Brush On Shield SPF 50 (Multiple tints, $130)    Chemical Sunscreens  Kassie Orozcoau Weightless Protection SPF 30 ($48)  Donavan SPF Brightening Moisturizer ($30)  Urban Skin Complexion Protection Moisturizer SPF 30 ($20)  Total Defense + Repair Broad Spectrum SPF 34 ($68)  Clarins UV PLUS Anti-Pollution Sunscreen Multi-Protection Tint SPF 50 (Multiple tints, $45)  Neutrogena Healthy Skin Glow Sheers Tinted Moisturizer with SPF 20 (Multiple tints, $11)    Learning the ABCDEs or Melanomas / Self Skin checks    Even if you have carefully practiced sun safety all summer, it's important to continue being vigilant about your skin in fall, winter, and beyond. Throughout the year, you should examine your skin head-to-toe once a month, looking for any  suspicious lesions. Self-exams can help you identify potential skin cancers early, when they can almost always be completely cured. As a general rule, to spot either melanomas or non-melanoma skin cancers (such as basal cell carcinoma and squamous cell carcinoma), take note of any new moles or growths, and any existing growths that begin to grow or change significantly in any other way.  Lesions that change, itch, bleed, or don't heal are also alarm signals. Physicians have developed two specific strategies for early recognition ofmelanoma, the deadliest form of skin cancer: the ABCDEs and the Ugly Duckling sign.      Moles, brown spots and growths on the skin are usually harmless -- but not always. Anyone who has more than 100 moles is at greater risk for melanoma. The first signs can appear in one or more atypical moles. That's why it's so important to get to know your skin very well and to recognize any changes in the moles on your body. Look for the ABCDE signs of melanoma, and if you see one or more, make an appointment with a physician immediately.    Common, benign moles look the same over time. Be on alert when moles start to evolve or change and see a doctor. Any change -- in size, shape, color, elevation, or another trait, or any new symptom such as bleeding, itching or crusting -- points to danger.    A - Asymmetry  This benign mole is not asymmetrical. If you draw a line through the middle, the two sides will match, meaning it is symmetrical. If you draw a line through this mole, the two halves will not match, meaning it is asymmetrical, a warning sign for melanoma.    B - Border  A benign mole has smooth, even borders, unlike melanomas. The borders of an early melanoma tend to be uneven. The edges may be scalloped or notched.     C - Color  Most benign moles are all one color -- often a single shade of brown. Having a variety of colors is another warning signal. A number of different shades of brown,  "tan or black could appear. A melanoma may also become red, white or blue.      D - Diameter  Benign moles usually have a smaller diameter than malignant ones. Melanomas usually are larger in diameter than the eraser on your pencil tip (  inch or 6mm), but they may sometimes be smaller when first detected.      E - Evolution  Common, benign moles look the same over time. Be on the alert when a mole starts to evolve or change in any way. When a mole is evolving, see a doctor. Any change -- in size, shape, color, elevation, or another trait, or any new symptom such as bleeding, itching or crusting -- points to danger.    The Ugly Duckling Rule  This is a simplified method where you look for any moles that do not match the other moles you have. Even if some of your moles look a little funny we are less concerned if they match one another. We are looking for the outlier - the one that is darker, larger, etc. In A, there is one dominant mole pattern with slight variation in size. The outlier lesion is clearly darker and larger than all other moles. In B, the patient has two predominant mole patterns, one with larger nevi and one with small, darker nevi. The outlier lesion is small but lacks pigmentation. In C, the patient shows only one lesion on the back. If this lesion is changing, symptomatic, or deemed atypical, it should be removed.      Seborrheic keratoses - a mimicker of melanoma    Seborrheic keratosis (seb-o-REE-ik care-uh-TOE-sis) is a common skin growth. It may seem worrisome because it can look like a wart, pre-cancerous skin growth (actinic keratosis), or skin cancer. Despite their appearance, seborrheic keratoses are harmless.    Most people get these growths when they are middle aged or older. Because they begin at a later age and can have a flat, waxy or wart-like appearance, seborrheic keratoses are often called the  barnacles of aging  or \"wisdom spots\".    It s possible to have just one of these " growths, but most people develop several. Seborrheic keratoses range in color from white to black; however, most are tan or brown. You can find these harmless growths anywhere on the skin, except the palms and soles. Most often, you ll see them on the chest, back, head, or neck. Seborrheic keratoses are not contagious.

## 2024-09-03 NOTE — NURSING NOTE
Dermatology Rooming Note    Nadira Perez's goals for this visit include:   Chief Complaint   Patient presents with    Derm Problem     FBSE- raised lesions in groin area     YIFAN Orourke

## 2024-09-04 ENCOUNTER — TELEPHONE (OUTPATIENT)
Dept: PLASTIC SURGERY | Facility: CLINIC | Age: 74
End: 2024-09-04

## 2024-09-04 ENCOUNTER — OFFICE VISIT (OUTPATIENT)
Dept: ORTHOPEDICS | Facility: CLINIC | Age: 74
End: 2024-09-04
Payer: COMMERCIAL

## 2024-09-04 ENCOUNTER — OFFICE VISIT (OUTPATIENT)
Dept: PLASTIC SURGERY | Facility: CLINIC | Age: 74
End: 2024-09-04
Payer: COMMERCIAL

## 2024-09-04 VITALS
HEIGHT: 65 IN | DIASTOLIC BLOOD PRESSURE: 76 MMHG | SYSTOLIC BLOOD PRESSURE: 124 MMHG | HEART RATE: 69 BPM | BODY MASS INDEX: 29.89 KG/M2 | OXYGEN SATURATION: 96 %

## 2024-09-04 DIAGNOSIS — S62.336D CLOSED DISPLACED FRACTURE OF NECK OF FIFTH METACARPAL BONE OF RIGHT HAND WITH ROUTINE HEALING, SUBSEQUENT ENCOUNTER: Primary | ICD-10-CM

## 2024-09-04 DIAGNOSIS — Q17.9 CONGENITAL ABNORMALITY OF EAR: Primary | ICD-10-CM

## 2024-09-04 PROCEDURE — 99213 OFFICE O/P EST LOW 20 MIN: CPT | Performed by: FAMILY MEDICINE

## 2024-09-04 PROCEDURE — 99024 POSTOP FOLLOW-UP VISIT: CPT | Performed by: STUDENT IN AN ORGANIZED HEALTH CARE EDUCATION/TRAINING PROGRAM

## 2024-09-04 ASSESSMENT — PAIN SCALES - GENERAL: PAINLEVEL: NO PAIN (0)

## 2024-09-04 NOTE — LETTER
9/4/2024       RE: Nadira Perez  [address]  [place] [address]     Dear Colleague,    Thank you for referring your patient, Nadira Perez, to the [facility] [place] at [facility]. Please see a copy of my visit note below.    PRS    No changes, except the swelling has come down and patient is able to now wear her hearing aid over the right ear.  There are times when she is outside and there is some sweat, and the hearing aid may slide off.    On exam, right ear incision is well-healed and there is still some residual swelling with ecchymosis.  Right hearing aid is sitting nicely over the helical rim.    A/P: 74-year-old female 6 weeks status post right functional otoplasty    -Reiterated that the swelling over the right ear should continue to improve and hopefully the margin of the helix will fit the hearing aid slightly better.  -Return to clinic in 6 weeks for reevaluation    Juan Agudelo MD, PhD      Again, thank you for allowing me to participate in the care of your patient.      Sincerely,    Juan Agudelo MD

## 2024-09-04 NOTE — TELEPHONE ENCOUNTER
Patient confirmed scheduled appointment:  Date: 10/16/24  Time: 4:30 pm  Visit type: POST-OP  Provider: Dr Aguedlo  Location: [facility] [place] 4th floor  Testing/imaging: n/a  Additional notes: n/a

## 2024-09-04 NOTE — PROGRESS NOTES
HISTORY OF PRESENT ILLNESS  Ms. Perez is a pleasant 74 year old female following up with a 5th metacarapal fracture.  Ismael has been experiencing an increase in her pain over the past week or so.  She has trouble abducting her fifth finger.  She does feel better over the past day or so, although pain is generally been present since her last visit with Dr. Borjas.  She does notice that her finger will abduct and will have difficulty adducting.     PHYSICAL EXAM  General  - normal appearance, in no obvious distress    Musculoskeletal -right fifth finger  - inspection: Hyperthenar atrophy  - palpation: Tender fifth metacarpal neck  - ROM: Restricted in MCP flexion and extension  Neuro  - no numbness, no motor deficit, grossly normal coordination, normal muscle tone  Skin  - no ecchymosis, erythema, warmth, or induration, no obvious rash      ASSESSMENT & PLAN  Ms. Perez is a 74 year old female following up with a 5th metacarpal fracture.    I reviewed her previous x-rays in the room with her, this did reveal bony callus, although healing appeared to be incomplete.    I am ordering a CT of her hand, specifically with attention to the right fifth finger to assess for healing.  I do think it is important that she keep her hand therapy appointment as well.  Lastly, we did supply her a dorian loop to help with finger stability.    I will get in touch with her with the results.    It was a pleasure seeing Ismael.        Willis Morgan DO, CAQSM      This note was constructed using Dragon dictation software, please excuse any minor errors in spelling, grammar, or syntax.

## 2024-09-04 NOTE — LETTER
9/4/2024      RE: Nadira Perez  [id number]  [place] [address]     Dear Colleague,    Thank you for referring your patient, Nadira Perez, to the [facility] [place]. Please see a copy of my visit note below.    HISTORY OF PRESENT ILLNESS  Ms. Perez is a pleasant 74 year old female following up with a 5th metacarapal fracture.  Ismael has been experiencing an increase in her pain over the past week or so.  She has trouble abducting her fifth finger.  She does feel better over the past day or so, although pain is generally been present since her last visit with Dr. Borjas.  She does notice that her finger will abduct and will have difficulty adducting.     PHYSICAL EXAM  General  - normal appearance, in no obvious distress    Musculoskeletal -right fifth finger  - inspection: Hyperthenar atrophy  - palpation: Tender fifth metacarpal neck  - ROM: Restricted in MCP flexion and extension  Neuro  - no numbness, no motor deficit, grossly normal coordination, normal muscle tone  Skin  - no ecchymosis, erythema, warmth, or induration, no obvious rash      ASSESSMENT & PLAN  Ms. Perez is a 74 year old female following up with a 5th metacarpal fracture.    I reviewed her previous x-rays in the room with her, this did reveal bony callus, although healing appeared to be incomplete.    I am ordering a CT of her hand, specifically with attention to the right fifth finger to assess for healing.  I do think it is important that she keep her hand therapy appointment as well.  Lastly, we did supply her a dorian loop to help with finger stability.    I will get in touch with her with the results.    It was a pleasure seeing Ismael.        Willis Morgan DO, CAQSM      This note was constructed using Dragon dictation software, please excuse any minor errors in spelling, grammar, or syntax.      Again, thank you for allowing me to participate in the care of your patient.       Sincerely,    Willis Morgan, DO

## 2024-09-04 NOTE — NURSING NOTE
"Chief Complaint   Patient presents with    Surgical Followup     6 week post-op, DOS 7/25/24.       Vitals:    09/04/24 1413   BP: 124/76   BP Location: Right arm   Patient Position: Chair   Cuff Size: Adult Regular   Pulse: 69   SpO2: 96%   Height: 1.651 m (5' 5\")       Body mass index is 29.89 kg/m .      Lorena Orr LPN    "

## 2024-09-05 ENCOUNTER — THERAPY VISIT (OUTPATIENT)
Dept: OCCUPATIONAL THERAPY | Facility: CLINIC | Age: 74
End: 2024-09-05
Payer: COMMERCIAL

## 2024-09-05 DIAGNOSIS — S62.336D CLOSED DISPLACED FRACTURE OF NECK OF FIFTH METACARPAL BONE OF RIGHT HAND WITH ROUTINE HEALING, SUBSEQUENT ENCOUNTER: ICD-10-CM

## 2024-09-05 DIAGNOSIS — M25.641 STIFFNESS OF FINGER JOINT OF RIGHT HAND: Primary | ICD-10-CM

## 2024-09-05 PROCEDURE — 97165 OT EVAL LOW COMPLEX 30 MIN: CPT | Mod: GO | Performed by: OCCUPATIONAL THERAPIST

## 2024-09-05 PROCEDURE — 97110 THERAPEUTIC EXERCISES: CPT | Mod: GO | Performed by: OCCUPATIONAL THERAPIST

## 2024-09-05 NOTE — PROGRESS NOTES
[facility] Pain Management     Date of visit: 9/6/2024      Assessment:   Nadira Perez is a 74 year old year old female  who presents to the pain clinic with low back pain. It has been noted that the caudal GREGOR covers her chronic back pain the best thus far. Giong forward we will try to refrain from repeating SI joint and L3-4 GREGOR injection to avoid the side effects of cumulative steroids. Prior discussion with Dr. Umaña -  she is receiving steroid injections in the wrist, need to keep a count of annual steroid injections received.         [place] [facility] [place]     Visit Diagnoses:  1. Right leg weakness    2. Right foot drop    3. Neuropathic pain    4. Myofascial pain        Plan:  Diagnosis reviewed, treatment option addressed, and risk/benifits discussed.  Self-care instructions given.  I am recommending a multidisciplinary treatment plan to help this patient better manage their pain.      Physical Therapy:  YES     Pain PT with Otto Jauregui previously.   Referral for PT to target  RLE - concerns for lower leg atrophy, potentially from use of brace. Scheduling number is [phone].      Pain Psychology: No     Diagnostic Studies:    No new orders today.      Medication Management:   Gabapentin 1200 mg TID and tizanidine 4 mg at bedtime PRN. PCP managing.   Topicals - Diclofenac gel and lidocaine cream   Medical cannabis/CBD - appreciates benefit, recent use of OTC products.      Potential procedures:   Caudal GREGOR - last completed on 1/2/24. Appreciates benefit. May repeat every 3+ months. Advised to contact clinic if interested in repeat injection.   Bilateral SI joint injections scheduled on 10/2 with Dr. Saldivar.      Other Orders/Referrals:   None      Follow up with MERCEDES Pace CNP in 3-6 months or sooner if needed       Review of Electronic Chart: Today I have also reviewed available medical information in the patient's medical record at [facility] (EPIC) and Care Everywhere  (if available), including relevant provider notes, laboratory work, and imaging.     Renée Harris DNP, APRN, [facility] Pain Management     -------------------------------------------------    Subjective:    Chief complaint:   Chief Complaint   Patient presents with    Pain    RECHECK     Follow up- fractured wrist        Interval history:  Nadira Perez is a 74 year old female last seen on 6/3/24.  They are a patient of mine seen in follow up.     Recommendations/plan at the last visit included:  Pain Physical Therapy:  YES   - Continue working with Otto Jauregui PT.      Pain Psychologist to address relaxation, behavioral change, coping style, and other factors important to improvement.  NO - not indicated.      Diagnostic Studies:  BMP on 5/3/24 - GFR 69     Medication Management:   Gabapentin 1200 mg TID and tizanidine 4 mg at bedtime PRN. PCP managing.   Topicals - Diclofenac gel and lidocaine cream   OTC cannabis/CBD - appreciates benefit. Consider [place] program.      Potential procedures:   Caudal GREGOR - last completed on 1/2/24. Appreciates benefit. May repeat every 3+ months. Advised to contact clinic if interested in repeat injection.      Other Orders/Referrals:   None      Follow up with MERCEDES Pace CNP in 3-6 months or sooner if needed     Since her last visit, Nadira Perez reports:  -She is working on weight loss right now.   -Bilateral SIJ injections scheduled on 10/2 with Dr. Saldivar. She states this is first time, has been asking for SI injections for a while.   -New Closed displaced fracture of neck of fifth metacarpal bone of right hand, saw Dr. Morgan in Sports Med on 7/10 for initial evaluation.   -She got cast off about 2 weeks ago, currently engaging in hand therapy.   -She completed sleep study since last visit, has follow up on 9/19/24.   -She also has concerns for lower RLE muscle atrophy, potentially   -She will consider sooner follow up with me after SI joint  "injections if not effective.   -She recently purchased sleep number bed.   -She was previously prescribing hydrocodone #10 tabs/month in the past, states that she felt like she was \"judged\" at one point when getting discharged from hospital.   -She is managing with current pain plan.     PEG: A Three-Item Scale Assessing Pain Intensity and Interference    What number best describes your PAIN ON AVERAGE in the past week? 3    What number best describes how, during the past week, pain has interfered with your ENJOYMENT OF LIFE? 3    What number best describes how, during the past week, pain has interfered with your GENERAL ACTIVITY? 3    PEG Total Score: 3    Marin CROWE, Rocky KA, Maxwell MJ, Eden TA, Jarvis ANGELA, Ken JM, Pooja SM, Jewel K. Development and initial validation of the PEG, a 3-item scale assessing pain intensity and interference. Journal of General Internal Medicine. 2009 Charlie;24:733-738.        Interval history from last visit on 6/3/24:  -She presents today for transfer of care, previously seeing Dr. Umaña.   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   -She has a [employer] business, [employer].   -She engages in piliates, heat/cold, stretching, massage, HEP,   -She recently purchased TENS unit and lidocaine patches,   -Caudal GREGOR on 1/2/24  -Tizanidine and gabapentin from PCP, diclofenac gel from Dr. Conley.   -She has hx of falls, balance concerns.   -She's been dealing with right foot drop         Current Pain Treatments:    Medications:                             Tizanidine 4 mg at bedtime  (PCP)              Gabapentin 1200 mg TID (PCP manages)              OTC cannabis/CBD     2. Other therapies:               Pain PT              Piliates/HEP              Heat/Ice              Caudal GREGOR       Current MME: N/A     Review of [place] Prescription Monitoring Program (): No concern for abuse or misuse of controlled medications based on this report. Reviewed - appears appropriate.        Past pain " treatments:  Hydrocodone 5-325 mg PRN (PCP managed), stopped due to concerns for stigma/judgement by health care providers. Could consider resuming occasional use.     Medications:  Current Outpatient Medications   Medication Sig Dispense Refill    acetaminophen (TYLENOL) 500 MG tablet Take 1,000 mg by mouth 3 times daily      artificial tears OINT ophthalmic ointment at bedtime      Ascorbic Acid (VITAMIN C) 500 MG CHEW Take 1 tablet by mouth every morning      atorvastatin (LIPITOR) 80 MG tablet Take 1 tablet (80 mg) by mouth daily 90 tablet 3    carvedilol (COREG) 12.5 MG tablet Take 1 tablet (12.5 mg) by mouth 2 times daily (with meals) 180 tablet 3    Cholecalciferol (VITAMIN D3) 50 MCG (2000 UT) CAPS TAKE 3 CAPSULES (6,000 UNITS) BY MOUTH DAILY. (Patient taking differently: Take 6,000 Units by mouth every morning.) 270 capsule 3    CRANBERRY EXTRACT PO Take 500 mg by mouth every morning      diclofenac (VOLTAREN) 1 % topical gel Apply 2 g topically 4 times daily 100 g 6    gabapentin (NEURONTIN) 300 MG capsule Take 4 capsules (1,200 mg) by mouth 3 times daily 1080 capsule 3    HEMP OIL OR EXTRACT OR OTHER CBD CANNABINOID, NOT MEDICAL CANNABIS, THC      hydrochlorothiazide (HYDRODIURIL) 25 MG tablet Take 1 tablet (25 mg) by mouth daily 90 day supply denied 30 tablet 2    levothyroxine (SYNTHROID/LEVOTHROID) 112 MCG tablet TAKE 1/2 TAB BY MOUTH EVERY DAY 45 tablet 3    lisinopril (ZESTRIL) 20 MG tablet Take 1 tablet (20 mg) by mouth 2 times daily 180 tablet 0    MAGNESIUM GLYCINATE PO Take 480 mg by mouth daily      Melatonin 10 MG TABS tablet Take 10 mg by mouth at bedtime      Multiple Vitamin (MULTI-VITAMIN) per tablet Take 1 tablet by mouth every morning      ondansetron (ZOFRAN) 4 MG tablet Take 1 tablet (4 mg) by mouth every 6 hours as needed for nausea 12 tablet 0    ondansetron (ZOFRAN-ODT) 4 MG ODT tab Take 1 tablet (4 mg) by mouth every 12 hours as needed for nausea 180 tablet 3    spironolactone  "(ALDACTONE) 25 MG tablet Take 25 mg by mouth daily at 2 pm      tiZANidine (ZANAFLEX) 4 MG tablet Take 1 tablet (4 mg) by mouth at bedtime 90 tablet 1    Turmeric 500 MG CAPS Take 500 mg by mouth every morning         Medical History: any changes in medical history since they were last seen? Yes - right hand fracture, see chart      Objective:    Physical Exam:  Blood pressure 125/85, pulse 79, resp. rate 16, height 1.651 m (5' 5\"), weight 79.7 kg (175 lb 11.2 oz), SpO2 97%, not currently breastfeeding.  Constitutional: Well developed, well nourished, appears stated age.  Gait: uses walker, AFO brace RLE.   HEENT: Head atraumatic, normocephalic. Eyes without conjunctival injection or jaundice. Oropharynx clear. Neck supple. No obvious neck masses.  Skin: No rash, lesions, or petechiae of exposed skin.   Psychiatric/mental status: Alert, without lethargy or stupor. Speech fluent. Appropriate affect. Mood normal. Able to follow commands without difficulty.     Diagnostic Tests/Imaging/Labs:  Reviewed labs, BMP on 7/23/24 - GFR 84    BILLING TIME DOCUMENTATION:   The total TIME spent on this patient on the date of the encounter/appointment was 30 minutes.      TOTAL TIME includes:   Time spent preparing to see the patient (reviewing records and tests)   Time spent face to face (or over the phone) with the patient   Time spent ordering tests, medications, procedures and referrals   Time spent Referring and communicating with other healthcare professionals   Time spent documenting clinical information in Epic           "

## 2024-09-05 NOTE — PROGRESS NOTES
OCCUPATIONAL THERAPY EVALUATION  Type of Visit: Evaluation       Fall Risk Screen:  Have you fallen 2 or more times in the past year?: Yes  Have you fallen and had an injury in the past year?: Yes  Is patient a fall risk?: No    Subjective      Presenting condition or subjective complaint: PT eval after fracture of right 5th metacarpal  Date of onset: 07/10/24 (DOI)    Relevant medical history: Anemia; Arthritis; Bladder or bowel problems; Cancer; Diabetes; Dizziness; Fibromyalgia; Foot drop; Hearing problems; Heart problems; Hepatitis; High blood pressure; History of fractures; Menopause; Osteoarthritis; Osteoporosis; Overweight; Pain at night or rest; Progressive neurological deficits; Radiation treatment; Severe dizziness; Thyroid problems; Vision problems   Dates & types of surgery: See medical record.  Too many to list.    Prior diagnostic imaging/testing results: X-ray     Prior therapy history for the same diagnosis, illness or injury: No      Prior Level of Function  Transfers: Independent  Ambulation: Independent  ADL: Independent    Living Environment  Social support: Alone   Type of home: [facility]   Stairs to enter the home: No       Ramp: No   Stairs inside the home: Yes 13 Is there a railing: Yes     Help at home: Home management tasks (cooking, cleaning); Home and Yard maintenance tasks  Equipment owned: Straight Cane; Four-point cane; Walker with wheels; Standard wheelchair; Bath bench     Employment: Yes Part time nurse consultant (self-employed)  Hobbies/Interests: Reading, old movies, some computer games, volunteering    Patient goals for therapy: Obtain most of my dominant hand use as possible.       Objective   ADDITIONAL HISTORY:  Right hand dominant  Patient reports symptoms of pain, stiffness/loss of motion, and weakness/loss of strength  Transportation: drives  Currently self-employed as a part time nurse consultant    Functional Outcome Measure:   Upper Extremity Functional Index  Score:  SCORE:   Column Totals: /80: 30   (A lower score indicates greater disability.)    PAIN:  Pain Level at Rest: 0/10  Pain Level with Use: 1/10  Pain Location: small finger  Pain Quality: Aching and Dull  Pain Frequency: intermittent  Pain is Worst: daytime  Pain is Exacerbated By: movement  Pain is Relieved By: rest  Pain Progression: Improved    ROM:   Hand ROM  Right AROM    Small MP 0/82   PIP 0/103   DIP 0/72   Total Active Motion 257        Assessment & Plan   CLINICAL IMPRESSIONS  Medical Diagnosis: R small MC fracture    Treatment Diagnosis: R small stiffness and pain    Impression/Assessment: Pt is a 74 year old female presenting to Occupational Therapy due to right small finger fracture with stiffness and weakness.  The following significant findings have been identified: Impaired ROM and Impaired strength.  These identified deficits interfere with their ability to perform recreational activities, household chores, and meal planning and preparation as compared to previous level of function.   Patient's limitations or Problem List includes: Pain, Decreased ROM/motion, and Weakness of the right small finger which interferes with the patient's ability to perform Self Care Tasks (dressing, eating), Recreational Activities, and Household Chores as compared to previous level of function.    Clinical Decision Making (Complexity):  Assessment of Occupational Performance: 1-3 Performance Deficits  Occupational Performance Limitations: home establishment and management, meal preparation and cleanup, and leisure activities  Clinical Decision Making (Complexity): Low complexity    PLAN OF CARE  Treatment Interventions:  Therapeutic Exercise:  AROM, AAROM, PROM, Tendon Gliding, Blocking, Place and Hold, Isotonics, and Isometrics  Neuromuscular re-education:  Coordination/Dexterity, Proprioceptive Training, and Kinesiotaping  Manual Techniques:  Myofascial release and Manual edema mobilization    Long Term Goals    OT Goal 1  Goal Identifier: Household Chores  Goal Description: Pt will be able to open a new jar without pain in order to maximize independence with ADLs  Target Date: 10/17/24      Frequency of Treatment: 1x/week  Duration of Treatment: 6 weeks     Recommended Referrals to Other Professionals:  none  Education Assessment: Learner/Method: Patient;No Barriers to Learning     Risks and benefits of evaluation/treatment have been explained.   Patient/Family/caregiver agrees with Plan of Care.     Evaluation Time:    OT Eval, Low Complexity Minutes (17076): 16   Present: Not applicable     Signing Clinician: MONICA Juares [facility]                                                                                   OUTPATIENT OCCUPATIONAL THERAPY      PLAN OF TREATMENT FOR OUTPATIENT REHABILITATION   Patient's Last Name, First Name, MARCELINOAUGUSTIN  PerezNadira alvarez YOB: 1950   Provider's Name   [facility]   Medical Record No.  [id number]     Onset Date: 07/10/24 (DOI) Start of Care Date: 09/05/24     Medical Diagnosis:  R small MC fracture      OT Treatment Diagnosis:  R small stiffness and pain Plan of Treatment  Frequency/Duration:1x/week/6 weeks    Certification date from 09/05/24   To 10/17/24        See note for plan of treatment details and functional goals     Elizabeth Arnold OT                         I CERTIFY THE NEED FOR THESE SERVICES FURNISHED UNDER        THIS PLAN OF TREATMENT AND WHILE UNDER MY CARE     (Physician attestation of this document indicates review and certification of the therapy plan).              Referring Provider:  Willis Borjas    Initial Assessment  See Epic Evaluation- 09/05/24

## 2024-09-05 NOTE — PROGRESS NOTES
PRS    No changes, except the swelling has come down and patient is able to now wear her hearing aid over the right ear.  There are times when she is outside and there is some sweat, and the hearing aid may slide off.    On exam, right ear incision is well-healed and there is still some residual swelling with ecchymosis.  Right hearing aid is sitting nicely over the helical rim.    A/P: 74-year-old female 6 weeks status post right functional otoplasty    -Reiterated that the swelling over the right ear should continue to improve and hopefully the margin of the helix will fit the hearing aid slightly better.  -Return to clinic in 6 weeks for reevaluation    Juan Agudelo MD, PhD

## 2024-09-06 ENCOUNTER — DOCUMENTATION ONLY (OUTPATIENT)
Dept: AUDIOLOGY | Facility: CLINIC | Age: 74
End: 2024-09-06

## 2024-09-06 ENCOUNTER — OFFICE VISIT (OUTPATIENT)
Dept: ANESTHESIOLOGY | Facility: CLINIC | Age: 74
End: 2024-09-06
Payer: COMMERCIAL

## 2024-09-06 ENCOUNTER — TELEPHONE (OUTPATIENT)
Dept: CARDIOLOGY | Facility: CLINIC | Age: 74
End: 2024-09-06

## 2024-09-06 VITALS
RESPIRATION RATE: 16 BRPM | BODY MASS INDEX: 29.27 KG/M2 | HEART RATE: 79 BPM | DIASTOLIC BLOOD PRESSURE: 85 MMHG | WEIGHT: 175.7 LBS | OXYGEN SATURATION: 97 % | HEIGHT: 65 IN | SYSTOLIC BLOOD PRESSURE: 125 MMHG

## 2024-09-06 DIAGNOSIS — H90.3 SENSORINEURAL HEARING LOSS, BILATERAL: Primary | ICD-10-CM

## 2024-09-06 DIAGNOSIS — M79.18 MYOFASCIAL PAIN: ICD-10-CM

## 2024-09-06 DIAGNOSIS — M79.2 NEUROPATHIC PAIN: ICD-10-CM

## 2024-09-06 DIAGNOSIS — M21.371 RIGHT FOOT DROP: ICD-10-CM

## 2024-09-06 DIAGNOSIS — R29.898 RIGHT LEG WEAKNESS: Primary | ICD-10-CM

## 2024-09-06 DIAGNOSIS — I10 BENIGN ESSENTIAL HYPERTENSION: ICD-10-CM

## 2024-09-06 PROCEDURE — 99214 OFFICE O/P EST MOD 30 MIN: CPT

## 2024-09-06 ASSESSMENT — PAIN SCALES - GENERAL: PAINLEVEL: MODERATE PAIN (4)

## 2024-09-06 NOTE — TELEPHONE ENCOUNTER
9/10/2024 1:38PM Betina Pawan  Patient confirmed rescheduled appointment:  Date: 9/16/2024  Time: 9AM  Visit type: Return Cardio-oncology (video visit)  Provider: Dr. Rascon  Location: [place] Visit; [facility], [address] [place] [address], [address]rd Floor [address], [place] [address]  Testing/imaging: Pt requesting to complete labs in [facility]- A1C and blood sugar. Sent message to Jose Lo to create labs orders.  Additional notes: 9/10 Rescheduled Return Cardio-oncology video visit w/ Dr. Rascon to held spot on 9/16. Cancelled 11/4 appt w/ Dr. Rascon. Pt requested A1C and blood sugar labs and she will walk-in to [facility] to get them. KESHIA Villalta 9/10/2024 1:38PM        9/10/2024 1:23PM Betina Pawan  Left Voicemail (1st Attempt) and Sent Mychart (1st Attempt) for the patient to call back and reschedule the following (if she would like sooner appt):    Appointment type: Return Cardio-oncology (Video Visit)  Provider: Dr. Rascon  Return date: 9/16 at 9AM (appt on hold)  Specialty phone number: Betina OLIVER [phone]  Additional appointment(s) needed: Pt may still want lab prior  Additonal Notes: 9/10 LVM and MYC to offer Return Cardio-oncology video visit w/ Dr. Rascon 9/16 (appt is on hold) or Return Cardiology w/ TOBI. KESHIA     OR    Appointment type: Return Cardiology  Provider: TOBI  Return date: Next available  Specialty phone number: Betina OLIVER [phone]  Additional appointment(s) needed: Pt may still want labs prior  Additonal Notes: 9/10 LVM and MYC to offer Return Cardio-oncology video visit w/ Dr. Rascon 9/16 (appt is on hold) or Return Cardiology w/ TOBI. KESHIA Villalta 9/10/2024 1:23PM        9/6/2024 5:04PM Betina Villalta  Left Voicemail (1st Attempt) and Sent Mychart (1st Attempt) for the patient to call back and reschedule the following (if the pt would like a sooner appt):    Appointment type: Return Cardiology  Provider: Gen card TOBI  Return date: Next available  Specialty phone number: [phone]  option 1  Additional appointment(s) needed: NA  Additonal Notes: 9/6 LVM and MYC to offer Return Cardiology w/ TOBI. KESHIA Villalta 9/6/2024 5:04PM

## 2024-09-06 NOTE — NURSING NOTE
Patient presents with:  Pain  RECHECK: Follow up- fractured wrist       Moderate Pain (4)     Pain Medications       Analgesics Other Refills Start End     acetaminophen (TYLENOL) 500 MG tablet --  --    Sig - Route: Take 1,000 mg by mouth 3 times daily - Oral    Class: Historical            What medications are you using for pain? Tylenol, gabapentin, canabis    What refills are you needing today? none    Expectations: I don't know why I am here    Evei Gardner LPN

## 2024-09-06 NOTE — TELEPHONE ENCOUNTER
----- Message from Marcia PERRY sent at 9/6/2024  3:47 PM CDT -----  Regarding: RE: Return Cardio-oncology w/ Dr. Rascon  Yes, could schedule as return cardiology.     Jose Lo RN   Cardiology Nurse Coordinator  ----- Message -----  From: Betina Villalta  Sent: 9/6/2024   3:05 PM CDT  To: Marcia Lo RN  Subject: RE: Return Cardio-oncology w/ Dr. Rascon         If she saw TOBI would that just be Return Cardiology?    Thanks,    Betina Villalta  ----- Message -----  From: Marcia Lo RN  Sent: 9/6/2024   2:47 PM CDT  To: Betina Villalta  Subject: RE: Return Cardio-oncology w/ Dr. Tarah Pisanolo,     She could do a virtual visit with Dr. Rascon. Or she could see an TOBI.     Jose Lo RN   Cardiology Nurse Coordinator  ----- Message -----  From: Betina Villalta  Sent: 9/6/2024   2:16 PM CDT  To: Marcia Lo RN  Subject: Return Cardio-oncology w/ Dr. Rascon             Hi,    I have pt on high priority waitlist for Return Cardio-oncology w/ Dr. Rascon and she is scheduled for 2/10. Is there any way she can be seen sooner? Let me know if there's anything else we can do or offer this pt.    Thank you,    Betina Villalta

## 2024-09-06 NOTE — TELEPHONE ENCOUNTER
9/6/2024 3:10PM Betina Villalta  Patient confirmed rescheduled appointment:  Date: 11/4/2024  Time: 9:30AM  Visit type: Return Cardio-oncology (video visit)  Provider: Dr. Rascon  Location: Video Visit[place] [address] [place] [address]  Testing/imaging: Pt would like A1C lab completed prior to appt  Additional notes: 9/6 Rescheduled Return Cardio-oncology w/ Dr. Rascon 11/4 as a video visit and kept pt on waitlist. KESHIA Villalta 9/6/2024 3:10PM

## 2024-09-06 NOTE — PROGRESS NOTES
Walk-in hearing aid services on 9/6/24: The patient asked to have her hearing aids cleaned and checked. Both hearing aids were deep cleaned and the  filters and domes were replaced. A listening check subsequently found the hearing aids to be working properly and they were returned to the patient. A clean & check charge is being assessed for today's services.    Richard Ruffin  Audiology Clinic Assistant        I reviewed the procedures/testing performed by the audiology assistant, and agree with the assessment and plan as documented.      Rik Arauz  Audiologist  [place] License  #[id number]

## 2024-09-06 NOTE — TELEPHONE ENCOUNTER
----- Message from Marcia PERRY sent at 9/6/2024  2:47 PM CDT -----  Regarding: RE: Return Cardio-oncology w/ Dr. Tarah Zavala,     She could do a virtual visit with Dr. Rascon. Or she could see an TOBI.     Jose Lo RN   Cardiology Nurse Coordinator  ----- Message -----  From: Betina Villalta  Sent: 9/6/2024   2:16 PM CDT  To: Marcia Lo RN  Subject: Return Cardio-oncology w/ Dr. Rascon             Hi,    I have pt on high priority waitlist for Return Cardio-oncology w/ Dr. Rascon and she is scheduled for 2/10. Is there any way she can be seen sooner? Let me know if there's anything else we can do or offer this pt.    Thank you,    Betina Villalta

## 2024-09-06 NOTE — TELEPHONE ENCOUNTER
9/6/2024 2:16PM Betina Pawan  Patient confirmed scheduled appointment:  Date: 2/10/2025  Time: 3:30PM  Visit type: Return Cardio-oncology  Provider: Dr. Rascon  [place] [address] [address]  Testing/imaging: Labs (1st Floor Imaging) prior at 3PM (for A1C to be checked- pt couldn't arrive earlier)  Additional notes: 9/6 Scheduled Return Cardio-oncology w/ Dr. Rascon 2/10 w/ labs prior (to check A1C). Added pt to high priority waitlist and sent message to Jose Lo to see if pt can be seen sooner. KESHIA Villalta 9/6/2024 2:16PM

## 2024-09-06 NOTE — LETTER
9/6/2024       RE: Nadira Perez  [address]  [facility] [address]     Dear Colleague,    Thank you for referring your patient, Nadira Perez, to the [facility] [place] at [facility]. Please see a copy of my visit note below.    [facility] Pain Management     Date of visit: 9/6/2024      Assessment:   Nadira Perez is a 74 year old year old female  who presents to the pain clinic with low back pain. It has been noted that the caudal GREGOR covers her chronic back pain the best thus far. Giong forward we will try to refrain from repeating SI joint and L3-4 GREGOR injection to avoid the side effects of cumulative steroids. Prior discussion with Dr. Umaña -  she is receiving steroid injections in the wrist, need to keep a count of annual steroid injections received.         [facility] [place] [facility] [place].     Visit Diagnoses:  1. Right leg weakness    2. Right foot drop    3. Neuropathic pain    4. Myofascial pain        Plan:  Diagnosis reviewed, treatment option addressed, and risk/benifits discussed.  Self-care instructions given.  I am recommending a multidisciplinary treatment plan to help this patient better manage their pain.      Physical Therapy:  YES     Pain PT with Otto Jauregui previously.   Referral for PT to target  [facility] - concerns for lower leg atrophy, potentially from use of brace. Scheduling number is [phone].      Pain Psychology: No     Diagnostic Studies:    No new orders today.      Medication Management:   Gabapentin 1200 mg TID and tizanidine 4 mg at bedtime PRN. PCP managing.   Topicals - Diclofenac gel and lidocaine cream   Medical cannabis/CBD - appreciates benefit, recent use of OTC products.      Potential procedures:   Caudal GREGOR - last completed on 1/2/24. Appreciates benefit. May repeat every 3+ months. Advised to contact clinic if interested in repeat injection.    Bilateral SI joint injections scheduled on 10/2 with Dr. Saldivar.      Other Orders/Referrals:   None      Follow up with MERCEDES Pace CNP in 3-6 months or sooner if needed       Review of Electronic Chart: Today I have also reviewed available medical information in the patient's medical record at [facility] ([facility]) and Care Everywhere (if available), including relevant provider notes, laboratory work, and imaging.     Renée Harris DNP, MERCEDES, AGNP-C  [facility] Pain Management     -------------------------------------------------    Subjective:    Chief complaint:   Chief Complaint   Patient presents with     Pain     RECHECK     Follow up- fractured wrist        Interval history:  Nadira Perez is a 74 year old female last seen on 6/3/24.  They are a patient of mine seen in follow up.     Recommendations/plan at the last visit included:  Pain Physical Therapy:  YES   - Continue working with Otto Jauregui PT.      Pain Psychologist to address relaxation, behavioral change, coping style, and other factors important to improvement.  NO - not indicated.      Diagnostic Studies:  BMP on 5/3/24 - GFR 69     Medication Management:   Gabapentin 1200 mg TID and tizanidine 4 mg at bedtime PRN. PCP managing.   Topicals - Diclofenac gel and lidocaine cream   OTC cannabis/CBD - appreciates benefit. Consider [place] program.      Potential procedures:   Caudal GREGOR - last completed on 1/2/24. Appreciates benefit. May repeat every 3+ months. Advised to contact clinic if interested in repeat injection.      Other Orders/Referrals:   None      Follow up with MERCEDES Pace CNP in 3-6 months or sooner if needed     Since her last visit, Nadira Perez reports:  -She is working on weight loss right now.   -Bilateral SIJ injections scheduled on 10/2 with Dr. Saldivar. She states this is first time, has been asking for SI injections for a while.   -New Closed displaced fracture of neck of fifth metacarpal bone of  "right hand, saw Dr. Morgan in Sports Med on 7/10 for initial evaluation.   -She got cast off about 2 weeks ago, currently engaging in hand therapy.   -She completed sleep study since last visit, has follow up on 9/19/24.   -She also has concerns for lower RLE muscle atrophy, potentially   -She will consider sooner follow up with me after SI joint injections if not effective.   -She recently purchased sleep number bed.   -She was previously prescribing hydrocodone #10 tabs/month in the past, states that she felt like she was \"judged\" at one point when getting discharged from hospital.   -She is managing with current pain plan.     PEG: A Three-Item Scale Assessing Pain Intensity and Interference    What number best describes your PAIN ON AVERAGE in the past week? 3    What number best describes how, during the past week, pain has interfered with your ENJOYMENT OF LIFE? 3    What number best describes how, during the past week, pain has interfered with your GENERAL ACTIVITY? 3    PEG Total Score: 3    Marin EE, Rocky KA, Maxwell MJ, Eden COHEN, Jarvis ANGELA, Ken LUBIN, Pooja SM, Jewel K. Development and initial validation of the PEG, a 3-item scale assessing pain intensity and interference. Journal of General Internal Medicine. 2009 Charlie;24:733-738.        Interval history from last visit on 6/3/24:  -She presents today for transfer of care, previously seeing Dr. Umaña.   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   -She has a consulting business, [employer].   -She engages in piliates, heat/cold, stretching, massage, HEP,   -She recently purchased TENS unit and lidocaine patches,   -Caudal GREGOR on 1/2/24  -Tizanidine and gabapentin from PCP, diclofenac gel from Dr. Conley.   -She has hx of falls, balance concerns.   -She's been dealing with right foot drop         Current Pain Treatments:    Medications:                             Tizanidine 4 mg at bedtime  (PCP)              Gabapentin 1200 mg TID (PCP manages)              OTC " cannabis/CBD     2. Other therapies:               Pain PT              Piliates/HEP              Heat/Ice              Caudal GREGOR       Current MME: N/A     Review of [place] Prescription Monitoring Program (): No concern for abuse or misuse of controlled medications based on this report. Reviewed - appears appropriate.        Past pain treatments:  Hydrocodone 5-325 mg PRN (PCP managed), stopped due to concerns for stigma/judgement by health care providers. Could consider resuming occasional use.     Medications:  Current Outpatient Medications   Medication Sig Dispense Refill     acetaminophen (TYLENOL) 500 MG tablet Take 1,000 mg by mouth 3 times daily       artificial tears OINT ophthalmic ointment at bedtime       Ascorbic Acid (VITAMIN C) 500 MG CHEW Take 1 tablet by mouth every morning       atorvastatin (LIPITOR) 80 MG tablet Take 1 tablet (80 mg) by mouth daily 90 tablet 3     carvedilol (COREG) 12.5 MG tablet Take 1 tablet (12.5 mg) by mouth 2 times daily (with meals) 180 tablet 3     Cholecalciferol (VITAMIN D3) 50 MCG (2000 UT) CAPS TAKE 3 CAPSULES (6,000 UNITS) BY MOUTH DAILY. (Patient taking differently: Take 6,000 Units by mouth every morning.) 270 capsule 3     CRANBERRY EXTRACT PO Take 500 mg by mouth every morning       diclofenac (VOLTAREN) 1 % topical gel Apply 2 g topically 4 times daily 100 g 6     gabapentin (NEURONTIN) 300 MG capsule Take 4 capsules (1,200 mg) by mouth 3 times daily 1080 capsule 3     HEMP OIL OR EXTRACT OR OTHER CBD CANNABINOID, NOT MEDICAL CANNABIS, THC       hydrochlorothiazide (HYDRODIURIL) 25 MG tablet Take 1 tablet (25 mg) by mouth daily 90 day supply denied 30 tablet 2     levothyroxine (SYNTHROID/LEVOTHROID) 112 MCG tablet TAKE 1/2 TAB BY MOUTH EVERY DAY 45 tablet 3     lisinopril (ZESTRIL) 20 MG tablet Take 1 tablet (20 mg) by mouth 2 times daily 180 tablet 0     MAGNESIUM GLYCINATE PO Take 480 mg by mouth daily       Melatonin 10 MG TABS tablet Take 10 mg by  "mouth at bedtime       Multiple Vitamin (MULTI-VITAMIN) per tablet Take 1 tablet by mouth every morning       ondansetron (ZOFRAN) 4 MG tablet Take 1 tablet (4 mg) by mouth every 6 hours as needed for nausea 12 tablet 0     ondansetron (ZOFRAN-ODT) 4 MG ODT tab Take 1 tablet (4 mg) by mouth every 12 hours as needed for nausea 180 tablet 3     spironolactone (ALDACTONE) 25 MG tablet Take 25 mg by mouth daily at 2 pm       tiZANidine (ZANAFLEX) 4 MG tablet Take 1 tablet (4 mg) by mouth at bedtime 90 tablet 1     Turmeric 500 MG CAPS Take 500 mg by mouth every morning         Medical History: any changes in medical history since they were last seen? Yes - right hand fracture, see chart      Objective:    Physical Exam:  Blood pressure 125/85, pulse 79, resp. rate 16, height 1.651 m (5' 5\"), weight 79.7 kg (175 lb 11.2 oz), SpO2 97%, not currently breastfeeding.  Constitutional: Well developed, well nourished, appears stated age.  Gait: uses walker, AFO brace RLE.   HEENT: Head atraumatic, normocephalic. Eyes without conjunctival injection or jaundice. Oropharynx clear. Neck supple. No obvious neck masses.  Skin: No rash, lesions, or petechiae of exposed skin.   Psychiatric/mental status: Alert, without lethargy or stupor. Speech fluent. Appropriate affect. Mood normal. Able to follow commands without difficulty.     Diagnostic Tests/Imaging/Labs:  Reviewed labs, [facility] on 7/23/24 - GFR 84    BILLING TIME DOCUMENTATION:   The total TIME spent on this patient on the date of the encounter/appointment was 30 minutes.      TOTAL TIME includes:   Time spent preparing to see the patient (reviewing records and tests)   Time spent face to face (or over the phone) with the patient   Time spent ordering tests, medications, procedures and referrals   Time spent Referring and communicating with other healthcare professionals   Time spent documenting clinical information in Epic             Again, thank you for allowing me to " participate in the care of your patient.      Sincerely,    MERCEDES Pace CNP

## 2024-09-09 ENCOUNTER — TELEPHONE (OUTPATIENT)
Dept: ANESTHESIOLOGY | Facility: CLINIC | Age: 74
End: 2024-09-09

## 2024-09-09 PROBLEM — M25.641 STIFFNESS OF FINGER JOINT OF RIGHT HAND: Status: ACTIVE | Noted: 2024-09-09

## 2024-09-09 PROBLEM — S62.336D CLOSED DISPLACED FRACTURE OF NECK OF FIFTH METACARPAL BONE OF RIGHT HAND WITH ROUTINE HEALING, SUBSEQUENT ENCOUNTER: Status: ACTIVE | Noted: 2024-09-09

## 2024-09-09 NOTE — TELEPHONE ENCOUNTER
Patient confirmed scheduled appointment:     Date: 12/10/24  Time: 11:45 AM  Visit type: Return pain  Visit mode: Virtual Visit  Provider: Renée Harris  [place] [facility] [place]  Testing/imaging:   Additional Notes:

## 2024-09-10 ENCOUNTER — OFFICE VISIT (OUTPATIENT)
Dept: SLEEP MEDICINE | Facility: CLINIC | Age: 74
End: 2024-09-10
Payer: COMMERCIAL

## 2024-09-10 VITALS
DIASTOLIC BLOOD PRESSURE: 85 MMHG | SYSTOLIC BLOOD PRESSURE: 125 MMHG | BODY MASS INDEX: 29.27 KG/M2 | HEIGHT: 65 IN | WEIGHT: 175.7 LBS | OXYGEN SATURATION: 97 % | HEART RATE: 79 BPM

## 2024-09-10 DIAGNOSIS — G47.36 HYPOXEMIA ASSOCIATED WITH SLEEP: Primary | ICD-10-CM

## 2024-09-10 DIAGNOSIS — G47.33 OSA (OBSTRUCTIVE SLEEP APNEA): ICD-10-CM

## 2024-09-10 PROCEDURE — 99214 OFFICE O/P EST MOD 30 MIN: CPT | Performed by: INTERNAL MEDICINE

## 2024-09-10 NOTE — PATIENT INSTRUCTIONS
Physical Therapy:  YES     Pain PT with Otto Jauregui previously.   Referral for PT to [facility]  [facility] - concerns for lower leg atrophy, potentially from use of brace. Scheduling number is [phone].      Pain Psychology: No     Diagnostic Studies:    No new orders today.      Medication Management:   Gabapentin 1200 mg TID and tizanidine 4 mg at bedtime PRN. PCP managing.   Topicals - Diclofenac gel and lidocaine cream   Medical cannabis/CBD - appreciates benefit, recent use of OTC products.      Potential procedures:   Caudal GREGOR - last completed on 1/2/24. Appreciates benefit. May repeat every 3+ months. Advised to contact clinic if interested in repeat injection.   Bilateral SI joint injections scheduled on 10/2 with Dr. Saldivar.      Other Orders/Referrals:   None      Follow up with MERCEDES Pace CNP in 3-6 months or sooner if needed

## 2024-09-10 NOTE — PATIENT INSTRUCTIONS
"          Frequently asked questions:  1. What is Obstructive Sleep Apnea (LOLY)? LOLY is the most common type of sleep apnea. Apnea means, \"without breath.\"  Apnea is most often caused by narrowing or collapse of the upper airway as muscles relax during sleep.   Almost everyone has occasional apneas. Most people with sleep apnea have had brief interruptions at night frequently for many years.  The severity of sleep apnea is related to how frequent and severe the events are.   2. What are the consequences of LOLY? Symptoms include: feeling sleepy during the day, snoring loudly, gasping or stopping of breathing, trouble sleeping, and occasionally morning headaches or heartburn at night.  Sleepiness can be serious and even increase the risk of falling asleep while driving. Other health consequences may include development of high blood pressure and other cardiovascular disease in persons who are susceptible. Untreated LOLY  can contribute to heart disease, stroke and diabetes.   3. What are the treatment options? In most situations, sleep apnea is a lifelong disease that must be managed with daily therapy. Medications are not effective for sleep apnea and surgery is generally not considered until other therapies have been tried. Your treatment is your choice . Continuous Positive Airway (CPAP) works right away and is the therapy that is effective in nearly everyone. An oral device to hold your jaw forward is usually the next most reliable option. Other options include postioning devices (to keep you off your back), weight loss, and surgery including a tongue pacing device. There is more detail about some of these options below.  4. Are my sleep studies covered by insurance? Although we will request verification of coverage, we advise you also check in advance of the study to ensure there is coverage.    Important tips for those choosing CPAP and similar devices  REMEMBER-IF YOU RECEIVE A CALL FROM  [phone]-->IT IS TO " SETUP A DEVICE  For new devices, sign up for device ELENA to monitor your device for your followup visits  We encourage you to utilize the what3words elena or website ( https://myair.dotSyntax/ ) to monitor your therapy progress and share the data with your healthcare team when you discuss your sleep apnea.                                                    Know your equipment:  CPAP is continuous positive airway pressure that prevents obstructive sleep apnea by keeping the throat from collapsing while you are sleeping. In most cases, the device is  smart  and can slowly self-adjusts if your throat collapses and keeps a record every day of how well you are treated-this information is available to you and your care team.  BPAP is bilevel positive airway pressure that keeps your throat open and also assists each breath with a pressure boost to maintain adequate breathing.  Special kinds of BPAP are used in patients who have inadequate breathing from lung or heart disease. In most cases, the device is  smart  and can slowly self-adjusts to assist breathing. Like CPAP, the device keeps a record of how well you are treated.  Your mask is your connection to the device. You get to choose what feels most comfortable and the staff will help to make sure if fits. Here: are some examples of the different masks that are available: Magnetic mask aids may assist with use but there are safety issues that should be addressed when considering with magnets* ( see end of discussion).       Key points to remember on your journey with sleep apnea:  Sleep study.  PAP devices often need to be adjusted during a sleep study to show that they are effective and adjusted right.  Good tips to remember: Try wearing just the mask during a quiet time during the day so your body adapts to wearing it. A humidifier is recommended for comfort in most cases to prevent drying of your nose and throat. Allergy medication from your provider may help you  if you are having nasal congestion.  Getting settled-in. It takes more than one night for most of us to get used to wearing a mask. Try wearing just the mask during a quiet time during the day so your body adapts to wearing it. A humidifier is recommended for comfort in most cases. Our team will work with you carefully on the first day and will be in contact within 4 days and again at 2 and 4 weeks for advice and remote device adjustments. Your therapy is evaluated by the device each day.   Use it every night. The more you are able to sleep naturally for 7-8 hours, the more likely you will have good sleep and to prevent health risks or symptoms from sleep apnea. Even if you use it 4 hours it helps. Occasionally all of us are unable to use a medical therapy, in sleep apnea, it is not dangerous to miss one night.   Communicate. Call our skilled team on the number provided on the first day if your visit for problems that make it difficult to wear the device. Over 2 out of 3 patients can learn to wear the device long-term with help from our team. Remember to call our team or your sleep providers if you are unable to wear the device as we may have other solutions for those who cannot adapt to mask CPAP therapy. It is recommended that you sleep your sleep provider within the first 3 months and yearly after that if you are not having problems.   Use it for your health. We encourage use of CPAP masks during daytime quiet periods to allow your face and brain to adapt to the sensation of CPAP so that it will be a more natural sensation to awaken to at night or during naps. This can be very useful during the first few weeks or months of adapting to CPAP though it does not help medically to wear CPAP during wakefulness and  should not be used as a strategy just to meet guidelines.  Take care of your equipment. Make sure you clean your mask and tubing using directions every day and that your filter and mask are replaced as  recommended or if they are not working.     *Masks with magnets:  Updated Contraindications  Masks with magnetic components are contraindicated for use by patients where they, or anyone in close physical contact while using the mask, have the following:   Active medical implants that interact with magnets (i.e., pacemakers, implantable cardioverter defibrillators (ICD), neurostimulators, cerebrospinal fluid (CSF) shunts, insulin/infusion pumps)   Metallic implants/objects containing ferromagnetic material (i.e., aneurysm clips/flow disruption devices, embolic coils, stents, valves, electrodes, implants to restore hearing or balance with implanted magnets, ocular implants, metallic splinters in the eye)  Updated Warning  Keep the mask magnets at a safe distance of at least 6 inches (150 mm) away from implants or medical devices that may be adversely affected by magnetic interference. This warning applies to you or anyone in close physical contact with your mask. The magnets are in the frame and lower headgear clips, with a magnetic field strength of up to 400mT. When worn, they connect to secure the mask but may inadvertently detach while asleep.  Implants/medical devices, including those listed within contraindications, may be adversely affected if they change function under external magnetic fields or contain ferromagnetic materials that attract/repel to magnetic fields (some metallic implants, e.g., contact lenses with metal, dental implants, metallic cranial plates, screws, hesham hole covers, and bone substitute devices). Consult your physician and  of your implant / other medical device for information on the potential adverse effects of magnetic fields.    BESIDES CPAP, WHAT OTHER THERAPIES ARE THERE?    Positioning Device  Positioning devices are generally used when sleep apnea is mild and only occurs on your back.This example shows a pillow that straps around the waist. It may be appropriate for  those whose sleep study shows milder sleep apnea that occurs primarily when lying flat on one's back. Preliminary studies have shown benefit but effectiveness at home may need to be verified by a home sleep test. These devices are generally not covered by medical insurance.  Examples of devices that maintain sleeping on the back to prevent snoring and mild sleep apnea.    Belt type body positioner  http://CloudOpt.AlwaysFashion/    Electronic reminder  http://nightshifttherapy.com/            Oral Appliance  What is oral appliance therapy?  An oral appliance device fits on your teeth at night like a retainer used after having braces. The device is made by a specialized dentist and requires several visits over 1-2 months before a manufactured device is made to fit your teeth and is adjusted to prevent your sleep apnea. Once an oral device is working properly, snoring should be improved. A home sleep test may be recommended at that time if to determine whether the sleep apnea is adequately treated.       Some things to remember:  -Oral devices are often, but not always, covered by your medical insurance. Be sure to check with your insurance provider.   -If you are referred for oral therapy, you will be given a list of specialized dentists to consider or you may choose to visit the Web site of the American Academy of Dental Sleep Medicine  -Oral devices are less likely to work if you have severe sleep apnea or are extremely overweight.     More detailed information  An oral appliance is a small acrylic device that fits over the upper and lower teeth  (similar to a retainer or a mouth guard). This device slightly moves jaw forward, which moves the base of the tongue forward, opens the airway, improves breathing for effective treat snoring and obstructive sleep apnea in perhaps 7 out of 10 people .  The best working devices are custom-made by a dental device  after a mold is made of the teeth 1, 2, 3.  When is an oral  appliance indicated?  Oral appliance therapy is recommended as a first-line treatment for patients with primary snoring, mild sleep apnea, and for patients with moderate sleep apnea who prefer appliance therapy to use of CPAP4, 5. Severity of sleep apnea is determined by sleep testing and is based on the number of respiratory events per hour of sleep.   How successful is oral appliance therapy?  The success rate of oral appliance therapy in patients with mild sleep apnea is 75-80% while in patients with moderate sleep apnea it is 50-70%. The chance of success in patients with severe sleep apnea is 40-50%. The research also shows that oral appliances have a beneficial effect on the cardiovascular health of LOLY patients at the same magnitude as CPAP therapy7.  Oral appliances should be a second-line treatment in cases of severe sleep apnea, but if not completely successful then a combination therapy utilizing CPAP plus oral appliance therapy may be effective. Oral appliances tend to be effective in a broad range of patients although studies show that the patients who have the highest success are females, younger patients, those with milder disease, and less severe obesity. 3, 6.   Finding a dentist that practices dental sleep medicine  Specific training is available through the American Academy of Dental Sleep Medicine for dentists interested in working in the field of sleep. To find a dentist who is educated in the field of sleep and the use of oral appliances, near you, visit the Web site of the American Academy of Dental Sleep Medicine.    References  1. Salma et al. Objectively measured vs self-reported compliance during oral appliance therapy for sleep-disordered breathing. Chest 2013; 144(5): 1505-0091.  2. Debora et al. Objective measurement of compliance during oral appliance therapy for sleep-disordered breathing. Thorax 2013; 68(1): 91-96.  3. Marcos et al. Mandibular advancement devices in  620 men and women with LOLY and snoring: tolerability and predictors of treatment success. Chest 2004; 125: 4850-5383.  4. Thomas et al. Oral appliances for snoring and LOLY: a review. Sleep 2006; 29: 244-262.  5. Ken et al. Oral appliance treatment for LOLY: an update. J Clin Sleep Med 2014; 10(2): 215-227.  6. Vic et al. Predictors of OSAH treatment outcome. J Dent Res 2007; 86: 1804-5205.      Weight Loss:   Your There is no height or weight on file to calculate BMI.    Being overweight does not necessarily mean you will have health consequences.  Those who have BMI over 35 or over 27 with existing medical conditions carries greater risk.   Weight loss decreases severity of sleep apnea in most people with obesity. For those with mild obesity who have developed snoring with weight gain, even 15-30 pound weight loss can improve and occasionally milder eliminate sleep apnea.  Structured and life-long dietary and health habits are necessary to lose weight and keep healthier weight levels.     The Comprehensive Weight loss program offers all aspects of weight loss strategies including two Non-Surgical Weight Loss Programs: Medical Weight Management and our 24 Week Healthy Lifestyle Program:    Medical Weight Management: You will meet with a Medical Weight Management Provider, as well as a Registered Dietician. The program may include medication therapy, dietary education, recommended exercise and physical therapy programs, monthly support group meetings, and possible psychological counseling. Follow up visits with the provider or dietician are scheduled based on your progress and needs.    24 Week Healthy Lifestyle Program: This unique program is designed to give you the support of weekly appointments and activities thru a 24-week period. It may include all of the components of the basic program (above), with the addition of 11 individual Health  Visits, 24-week access to the [facility] website for  over 700 online classes, and monthly support group meetings. This program has an out-of-pocket expense of $499 to cover the items that can not be billed to insurance (health coaches and [employer] access), and is non-refundable/non-transferable (you may be able to use a Health Savings Account; ask your HSA provider). There may be an optional meal replacement plan prescribed as well.   Surgical management achieves meaningful long-term weight loss and improvement in health risks in most patients with more severe obesity.      Sleep Apnea Surgery:    Surgery for obstructive sleep apnea is considered generally only when other therapies fail to work. Surgery may be discussed with you if you are having a difficult time tolerating CPAP and or when there is an abnormal structure that requires surgical correction.  Nose and throat surgeries often enlarge the airway to prevent collapse.  Most of these surgeries create pain for 1-2 weeks and up to half of the most common surgeries are not effective throughout life.  You should carefully discuss the benefits and drawbacks to surgery with your sleep provider and surgeon to determine if it is the best solution for you.   More information  Surgery for LOLY is directed at areas that are responsible for narrowing or complete obstruction of the airway during sleep.  There are a wide range of procedures available to enlarge and/or stabilize the airway to prevent blockage of breathing in the three major areas where it can occur: the palate, tongue, and nasal regions.  Successful surgical treatment depends on the accurate identification of the factors responsible for obstructive sleep apnea in each person.  A personalized approach is required because there is no single treatment that works well for everyone.  Because of anatomic variation, consultation with an examination by a sleep surgeon is a critical first step in determining what surgical options are best for each patient.  In some  cases, examination during sedation may be recommended in order to guide the selection of procedures.  Patients will be counseled about risks and benefits as well as the typical recovery course after surgery. Surgery is typically not a cure for a person s LOLY.  However, surgery will often significantly improve one s LOLY severity (termed  success rate ).  Even in the absence of a cure, surgery will decrease the cardiovascular risk associated with OSA7; improve overall quality of life8 (sleepiness, functionality, sleep quality, etc).      Palate Procedures:  Patients with LOLY often have narrowing of their airway in the region of their tonsils and uvula.  The goals of palate procedures are to widen the airway in this region as well as to help the tissues resist collapse.  Modern palate procedure techniques focus on tissue conservation and soft tissue rearrangement, rather than tissue removal.  Often the uvula is preserved in this procedure. Residual sleep apnea is common in patient after pharyngoplasty with an average reduction in sleep apnea events of 33%2.      Tongue Procedures:  ExamWhile patients are awake, the muscles that surround the throat are active and keep this region open for breathing. These muscles relax during sleep, allowing the tongue and other structures to collapse and block breathing.  There are several different tongue procedures available.  Selection of a tongue base procedure depends on characteristics seen on physical exam.  Generally, procedures are aimed at removing bulky tissues in this area or preventing the back of the tongue from falling back during sleep.  Success rates for tongue surgery range from 50-62%3.    Hypoglossal Nerve Stimulation:  Hypoglossal nerve stimulation has recently received approval from the [place] Food and Drug Administration for the treatment of obstructive sleep apnea.  This is based on research showing that the system was safe and effective in treating sleep  apnea6.  Results showed that the median AHI score decreased 68%, from 29.3 to 9.0. This therapy uses an implant system that senses breathing patterns and delivers mild stimulation to airway muscles, which keeps the airway open during sleep.  The system consists of three fully implanted components: a small generator (similar in size to a pacemaker), a breathing sensor, and a stimulation lead.  Using a small handheld remote, a patient turns the therapy on before bed and off upon awakening.    Candidates for this device must be greater than 18 years of age, have moderate to severe obstructive sleep apnea with less than 25% central events  (AHI between 15-65), BMI less than 35, have tried CPAP/oral appliance for at least 8 weeks without success, and have appropriate upper airway anatomy (determined by a sleep endoscopy performed by Dr. Ponce Lou or Dr. Amilcar Ty).    Nasal Procedures:  Nasal obstruction can interfere with nasal breathing during the day and night.  Studies have shown that relief of nasal obstruction can improve the ability of some patients to tolerate positive airway pressure therapy for obstructive sleep apnea1.  Treatment options include medications such as nasal saline, topical corticosteroid and antihistamine sprays, and oral medications such as antihistamines or decongestants. Non-surgical treatments can include external nasal dilators for selected patients. If these are not successful by themselves, surgery can improve the nasal airway either alone or in combination with these other options.        Combination Procedures:  Combination of surgical procedures and other treatments may be recommended, particularly if patients have more than one area of narrowing or persistent positional disease.  The success rate of combination surgery ranges from 66-80%2,3.    References  Lu LOZADA. The Role of the Nose in Snoring and Obstructive Sleep Apnoea: An Update.  Eur Arch Otorhinolaryngol. 2011; 268:  1365-73.   Norma SM; Mary JA; Bernadette JR; Pallanch JF; Milagros MB; Elizabeth SG; Chance MONTAGUE. Surgical modifications of the upper airway for obstructive sleep apnea in adults: a systematic review and meta-analysis. SLEEP 2010;33(10):0003-8323. Viridiana PETTY. Hypopharyngeal surgery in obstructive sleep apnea: an evidence-based medicine review.  Arch Otolaryngol Head Neck Surg. 2006 Feb;132(2):206-13.  William YH1, Isis Y, Marcin TEOFILO. The efficacy of anatomically based multilevel surgery for obstructive sleep apnea. Otolaryngol Head Neck Surg. 2003 Oct;129(4):327-35.  Kezirian E, Goldberg A. Hypopharyngeal Surgery in Obstructive Sleep Apnea: An Evidence-Based Medicine Review. Arch Otolaryngol Head Neck Surg. 2006 Feb;132(2):206-13.  Eva DELGADILLO et al. Upper-Airway Stimulation for Obstructive Sleep Apnea.  N Engl J Med. 2014 Jan 9;370(2):139-49.  Marty Y et al. Increased Incidence of Cardiovascular Disease in Middle-aged Men with Obstructive Sleep Apnea. Am J Respir Crit Care Med; 2002 166: 159-165  Key EM et al. Studying Life Effects and Effectiveness of Palatopharyngoplasty (SLEEP) study: Subjective Outcomes of Isolated Uvulopalatopharyngoplasty. Otolaryngol Head Neck Surg. 2011; 144: 623-631.        WHAT IF I ONLY HAVE SNORING?    Mandibular advancement devices, lateral sleep positioning, long-term weight loss and treatment of nasal allergies have been shown to improve snoring.  Exercising tongue muscles with a game (https://apps.Macaw/Kior/elena/Kawa Objects-reduce-snoring/qr0239813396) or stimulating the tongue during the day with a device (https://doi.org/10.3390/pnb76005846) have improved snoring in some individuals.  https://www.Onavo.Scoutzie/  https://www.sleepfoundation.org/best-anti-snoring-mouthpieces-and-mouthguards    Remember to Drive Safe... Drive Alive     Sleep health profoundly affects your health, mood, and your safety.  Thirty three percent of the population (one in three of us) is not getting enough sleep and  many have a sleep disorder. Not getting enough sleep or having an untreated / undertreated sleep condition may make us sleepy without even knowing it. In fact, our driving could be dramatically impaired due to our sleep health. As your provider, here are some things I would like you to know about driving:     Here are some warning signs for impairment and dangerous drowsy driving:              -Having been awake more than 16 hours               -Looking tired               -Eyelid drooping              -Head nodding (it could be too late at this point)              -Driving for more than 30 minutes     Some things you could do to make the driving safer if you are experiencing some drowsiness:              -Stop driving and rest              -Call for transportation              -Make sure your sleep disorder is adequately treated     Some things that have been shown NOT to work when experiencing drowsiness while driving:              -Turning on the radio              -Opening windows              -Eating any  distracting  /  entertaining  foods (e.g., sunflower seeds, candy, or any other)              -Talking on the phone      Your decision may not only impact your life, but also the life of others. Please, remember to drive safe for yourself and all of us.

## 2024-09-11 DIAGNOSIS — I10 BENIGN ESSENTIAL HYPERTENSION: ICD-10-CM

## 2024-09-11 NOTE — TELEPHONE ENCOUNTER
" AMLODIPINE BESYLATE 2.5 MG TAB      Last Written Prescription Date:  6/10/24   Last Fill Quantity: 90,   # refills: 1  Last Office Visit : 7/8/24  Future Office visit:  9/16/24 Tarah Meyers refill request to provider for review/approval because:  Drug not active on patient's medication list: Discontinued 8/16/24 by LALITA So 8/24/24 note( \"Amlodipine is on hold due to edema which has improved\" )    "

## 2024-09-12 ENCOUNTER — HOSPITAL ENCOUNTER (OUTPATIENT)
Dept: CT IMAGING | Facility: CLINIC | Age: 74
Discharge: HOME | End: 2024-09-12
Attending: FAMILY MEDICINE | Admitting: FAMILY MEDICINE
Payer: COMMERCIAL

## 2024-09-12 DIAGNOSIS — S62.336D CLOSED DISPLACED FRACTURE OF NECK OF FIFTH METACARPAL BONE OF RIGHT HAND WITH ROUTINE HEALING, SUBSEQUENT ENCOUNTER: ICD-10-CM

## 2024-09-12 LAB — SLPCOMP: NORMAL

## 2024-09-12 PROCEDURE — 73200 CT UPPER EXTREMITY W/O DYE: CPT | Mod: RT

## 2024-09-12 NOTE — PROGRESS NOTES
[facility]  Sleep clinic follow-up visit note      Date: September 10, 2024     Chief complaint: Review results of the recent sleep study     Nadira Perez is a very pleasant 74 year old female who previously presented to sleep clinic with symptoms suggestive of obstructive sleep apnea.  Polysomnography was obtained on August 20, 2024 to evaluate for sleep disordered breathing.  Patient presents to sleep clinic today to review the test results and to discuss plan of care.       Polysomnogram report:  Study date:  8/20/24     Sleep Architecture: Sleep architecture was remarkable for prolonged initial sleep latency, sleep fragmentation and increased wake after sleep onset with reduced sleep efficiency.  All sleep stages were seen.  Stage REM sleep was reduced.  The total recording time of the polysomnogram was 481.5 minutes. The total sleep time was 297.5 minutes. Sleep latency was increased at 34.1 minutes with the use of a sleep aid (melatonin 10 mg). REM latency was 170.5 minutes. Arousal index was increased at 34.5 arousals per hour. Sleep efficiency was decreased at 61.8%. Wake after sleep onset was 149.5 minutes. The patient spent 15.3% of total sleep time in Stage N1, 64.2% in Stage N2, 13.4% in Stage N3, and 7.1% in REM. Time in REM supine was 15.0 minutes.    Respiration: Moderate obstructive sleep apnea was present with sleep associated hypoxemia.  Obstructive events were observed during both supine and non-supine sleep positions and were pronounced during supine sleep.   Events ? The polysomnogram revealed a presence of 27 obstructive, 3 centrals, and - mixed apneas resulting in an apnea index of 6.1 events per hour. There were 76 obstructive hypopneas and - central hypopneas resulting in an obstructive hypopnea index of 15.3 and central hypopnea index of - events per hour. The combined apnea/hypopnea index was 21.4 events per hour (central apnea/hypopnea index was 0.6 events per hour). The  REM AHI was 11.4 events per hour. The supine AHI was 28.9 events per hour. The RERA index was 2.4 events per hour.  The RDI was 23.8 events per hour.  Snoring - was reported as mild to moderate and intermittent.  Respiratory rate and pattern - was notable for normal respiratory rate and pattern.  Sustained Sleep Associated Hypoventilation - Transcutaneous carbon dioxide monitoring was not used.  Sleep Associated Hypoxemia - (Greater than 5 minutes O2 sat at or below 88%) was present. Baseline oxygen saturation was 92.3%. Lowest oxygen saturation was 85.0%. Time spent less than or equal to 88% was 19.2 minutes. Time spent less than or equal to 89% was 42.0 minutes. Oxygen supplementation was initiated around 3:44 AM starting at 0.5 L/min and increased up to 1 L/min.  There was no indication for supplemental oxygen and no benefit with the O2 since obstructive event persisted.    Movement Activity: Frequent periodic limb movements were observed, and they were not associated with significant arousals.  Periodic Limb Activity - There were 121 PLMs during the entire study. The PLM index was 24.4 movements per hour. The PLM Arousal Index was 4.6 per hour.  REM EMG Activity - Excessive transient/sustained muscle activity was not present.  Nocturnal Behavior - Abnormal sleep related behaviors were not noted during/arising out of NREM / REM sleep.   Bruxism - Not apparent.    Cardiac Summary: Normal sinus rhythm was noted with frequent PVCs and occasional bradycardia   The average pulse rate was 60.6 bpm. The minimum pulse rate was 35.0 bpm while the maximum pulse rate was 77.0 bpm. Frequent PVCs and occasional bradycardia were noted. (30 second epoch shown below with PVCs)    Assessment:   Sleep architecture was remarkable for prolonged initial sleep latency, sleep fragmentation and increased wake after sleep onset with reduced sleep efficiency.  All sleep stages were seen.  Stage REM sleep was reduced.  Moderate obstructive  sleep apnea was present with sleep associated hypoxemia.  Obstructive events were observed during both supine and non-supine sleep positions and were pronounced during supine sleep.   Frequent periodic limb movements were observed, and they were not associated with significant arousals.  Normal sinus rhythm was noted with frequent PVCs and occasional bradycardia.    Test results were discussed with the patient in detail.        Past medical/surgical history, family history, social history, medications and allergies were reviewed.       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    Corneal opacity    Hypothyroidism    Osteopenia, unspecified location    Aromatase inhibitor use    Chronic pain of right knee    DDD (degenerative disc disease), lumbar    Degenerative scoliosis in adult patient    Carpal tunnel syndrome of right wrist    Peripheral neuropathy    Spinal stenosis of lumbar region with neurogenic claudication    Spondylolisthesis of lumbar region    Brain lesion    Dermatochalasis of both upper eyelids    S/P spinal fusion    Chronic bilateral low back pain    PVD (posterior vitreous detachment), left eye    Vitreous opacities of left eye    Closed nondisplaced fracture of lateral malleolus of left fibula, initial encounter    Acute left ankle pain    Sacroiliitis (H24)    Lumbar radiculopathy    Lumbosacral radiculopathy    Diabetes mellitus, type 2 (H)     "Abnormal electrocardiogram    Ejection fraction < 50%    Closed fracture of sternum, unspecified portion of sternum, initial encounter    Congenital abnormality of ear    Sacroiliac joint pain    Stiffness of finger joint of right hand    Closed displaced fracture of neck of fifth metacarpal bone of right hand with routine healing, subsequent encounter            Physical Examination:   /85   Pulse 79   Ht 1.651 m (5' 5\")   Wt 79.7 kg (175 lb 11.2 oz)   LMP  (LMP Unknown)   SpO2 97%   BMI 29.24 kg/m    General: Pleasant. Cooperative. In no apparent distress.  Pulmonary: Able to speak in full sentences easily. No cough or wheeze.   Neurologic: Alert, oriented x3.  Psychiatric: Mood euthymic. Affect congruent with full range and intensity.     ASSESSMENT/PLAN:  Obstructive sleep apnea: We discussed the results of the  sleep study with the patient in detail.  We discussed the consequences of untreated sleep apnea.  We also discussed the treatment options for LOLY. Recommended CPAP therapy based on the overall degree of severity of the LOLY and the hypoxemia.  Patient was interested in CPAP treatment.    DME orders generated for auto CPAP 5 to 15 cm water.  After obtaining CPAP equipment, patient was recommended to use the device regularly during sleep and was instructed to get back to us if any concerns with the device use.  We discussed the mask exchange policy and the compliance goals.  Patient  will be followed through sleep therapy management program.     She is scheduled for a follow-up with the sleep clinic on November 5, 2024 when compliance measures will be reviewed.     Sleep associated hypoxemia: Recommended obtaining an overnight oximetry with the auto CPAP device in approximately 1 month after initiating the CPAP therapy, to check for resolution of the hypoxemia.  Orders for future overnight oximetry generated in epic.  Overnight oximetry results will be communicated with patient via " "MyChart    Frequent PVCs and occasional bradycardia observed during the sleep study.  Recommended to  follow-up with cardiology.    We discussed weight management with healthy diet, and exercise.     Patient was strongly advised to avoid driving, operating any heavy machinery or other hazardous situations while drowsy or sleepy.  Patient was counseled on the importance of driving while alert, to pull over if drowsy, or nap before getting into the vehicle if sleepy.        The above note was dictated using voice recognition software. Although reviewed after completion, some word and grammatical error may remain . Please contact the author for any clarifications.      \" Total time spent was 30 minutes  for this appointment on this date of service which include time spent before, during and after the visit for chart review, patient care, counseling and coordination of care. Including documentation\"       Nathan Marquez MD  [facility]  [address], [address], [place] [address]  "

## 2024-09-12 NOTE — PROCEDURES
"   SLEEP STUDY INTERPRETATION  DIAGNOSTIC POLYSOMNOGRAPHY REPORT      Patient: DWAYNE STILES  YOB: 1950  Study Date: 8/20/2024  MRN: [id number]  Referring Provider: -  Ordering Provider: MARK Henderson MD    Indications for Polysomnography: The patient is a 74-year-old Female who is 5' 5\" and weighs 179.0 lbs. Her BMI is 29.8, Shubert sleepiness scale 5 and neck circumference is 34 cm. 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 A diagnostic polysomnogram was performed to evaluate for sleep apnea /hypoxemia.    Polysomnogram Data: A full night polysomnogram recorded the standard physiologic parameters including EEG, EOG, EMG, ECG, nasal and oral airflow. Respiratory parameters of chest and abdominal movements were recorded with respiratory inductance plethysmography. Oxygen saturation was recorded by pulse oximetry. Hypopnea scoring rule used: 1B 4%.    Sleep Architecture: Sleep architecture was remarkable for prolonged initial sleep latency, sleep fragmentation and increased wake after sleep onset with reduced sleep efficiency.  All sleep stages were seen.  Stage REM sleep was reduced.  The total recording time of the polysomnogram was 481.5 minutes. The total sleep time was 297.5 minutes. Sleep latency was increased at 34.1 minutes with the use of a sleep aid (melatonin 10 mg). REM latency was 170.5 minutes. Arousal index was increased at 34.5 arousals per hour. Sleep efficiency was decreased at 61.8%. Wake after sleep onset was 149.5 minutes. The patient spent 15.3% of total sleep time in Stage N1, 64.2% in Stage N2, 13.4% in Stage N3, and 7.1% in REM. Time in REM supine was 15.0 minutes.    Respiration: " Moderate obstructive sleep apnea was present with sleep associated hypoxemia.  Obstructive events were observed during both supine and non-supine sleep positions and were pronounced during supine sleep.   Events ? The polysomnogram revealed a presence of 27 obstructive, 3 centrals, and - mixed apneas resulting in an apnea index of 6.1 events per hour. There were 76 obstructive hypopneas and - central hypopneas resulting in an obstructive hypopnea index of 15.3 and central hypopnea index of - events per hour. The combined apnea/hypopnea index was 21.4 events per hour (central apnea/hypopnea index was 0.6 events per hour). The REM AHI was 11.4 events per hour. The supine AHI was 28.9 events per hour. The RERA index was 2.4 events per hour.  The RDI was 23.8 events per hour.  Snoring - was reported as mild to moderate and intermittent.  Respiratory rate and pattern - was notable for normal respiratory rate and pattern.  Sustained Sleep Associated Hypoventilation - Transcutaneous carbon dioxide monitoring was not used.  Sleep Associated Hypoxemia - (Greater than 5 minutes O2 sat at or below 88%) was present. Baseline oxygen saturation was 92.3%. Lowest oxygen saturation was 85.0%. Time spent less than or equal to 88% was 19.2 minutes. Time spent less than or equal to 89% was 42.0 minutes. Oxygen supplementation was initiated around 3:44 AM starting at 0.5 L/min and increased up to 1 L/min.  There was no indication for supplemental oxygen and no benefit with the O2 since obstructive event persisted.    Movement Activity: Frequent periodic limb movements were observed, and they were not associated with significant arousals.  Periodic Limb Activity - There were 121 PLMs during the entire study. The PLM index was 24.4 movements per hour. The PLM Arousal Index was 4.6 per hour.  REM EMG Activity - Excessive transient/sustained muscle activity was not present.  Nocturnal Behavior - Abnormal sleep related behaviors were not  noted during/arising out of NREM / REM sleep.   Bruxism - Not apparent.    Cardiac Summary: Normal sinus rhythm was noted with frequent PVCs and occasional bradycardia   The average pulse rate was 60.6 bpm. The minimum pulse rate was 35.0 bpm while the maximum pulse rate was 77.0 bpm. Frequent PVCs and occasional bradycardia were noted. (30 second epoch shown below with PVCs)        Assessment:   Sleep architecture was remarkable for prolonged initial sleep latency, sleep fragmentation and increased wake after sleep onset with reduced sleep efficiency.  All sleep stages were seen.  Stage REM sleep was reduced.  Moderate obstructive sleep apnea was present with sleep associated hypoxemia.  Obstructive events were observed during both supine and non-supine sleep positions and were pronounced during supine sleep.   Frequent periodic limb movements were observed, and they were not associated with significant arousals.  Normal sinus rhythm was noted with frequent PVCs and occasional bradycardia.      Recommendations:  Recommend repeat polysomnography with full night titration of positive airway pressure therapy for the control of sleep disordered breathing.  OR  Based on the presence of moderate obstructive sleep apnea, treatment could be empirically initiated with Auto titrating PAP therapy with a range of 5-15 cmH2O. Recommend clinical follow up with sleep management team.  Alternate options include combination of positional therapy during sleep and dental appliance through referral to Sleep Dentistry for the treatment of obstructive sleep apnea and snoring.  Advice regarding the risks of drowsy driving.  Suggest optimizing sleep schedule and avoiding sleep deprivation.  Weight management (if BMI > 30).  Continue follow up with Cardiology.  Pharmacologic therapy should be used for management of restless legs syndrome only if present and clinically indicated and not based on the presence of periodic limb movements  alone.    Diagnostic Codes:   Obstructive Sleep Apnea G47.33  Sleep Hypoxemia/Hypoventilation G47.36   Periodic Limb Movement Disorder G47.61  Repetitive Intrusions Into Sleep F51.8    8/20/2024 [facility] Sleep Study (179.0 lbs) - AHI 21.4, RDI 23.8, Supine AHI 28.9, REM AHI 11.4, Low O2 85.0%, Time Spent ?88% 19.2 minutes / Time Spent ?89% 42.0 minutes.     _____________________________________   Electronically Signed By: (Nathan Marquez MD), 9/10/24

## 2024-09-13 DIAGNOSIS — I10 BENIGN ESSENTIAL HYPERTENSION: ICD-10-CM

## 2024-09-13 RX ORDER — LISINOPRIL 20 MG/1
20 TABLET ORAL 2 TIMES DAILY
Qty: 180 TABLET | Refills: 0 | OUTPATIENT
Start: 2024-09-13

## 2024-09-13 RX ORDER — LISINOPRIL 20 MG/1
20 TABLET ORAL 2 TIMES DAILY
Qty: 180 TABLET | Refills: 0 | Status: SHIPPED | OUTPATIENT
Start: 2024-09-13

## 2024-09-16 ENCOUNTER — VIRTUAL VISIT (OUTPATIENT)
Dept: CARDIOLOGY | Facility: CLINIC | Age: 74
End: 2024-09-16
Attending: INTERNAL MEDICINE
Payer: COMMERCIAL

## 2024-09-16 ENCOUNTER — MYC MEDICAL ADVICE (OUTPATIENT)
Dept: ORTHOPEDICS | Facility: CLINIC | Age: 74
End: 2024-09-16
Payer: COMMERCIAL

## 2024-09-16 VITALS
HEIGHT: 65 IN | BODY MASS INDEX: 29.24 KG/M2 | HEART RATE: 72 BPM | SYSTOLIC BLOOD PRESSURE: 157 MMHG | DIASTOLIC BLOOD PRESSURE: 104 MMHG

## 2024-09-16 DIAGNOSIS — I10 BENIGN ESSENTIAL HYPERTENSION: Primary | ICD-10-CM

## 2024-09-16 DIAGNOSIS — Z85.3 HX: BREAST CANCER: ICD-10-CM

## 2024-09-16 PROCEDURE — 99214 OFFICE O/P EST MOD 30 MIN: CPT | Mod: 95 | Performed by: INTERNAL MEDICINE

## 2024-09-16 RX ORDER — SPIRONOLACTONE 50 MG/1
50 TABLET, FILM COATED ORAL
Qty: 90 TABLET | Refills: 2 | Status: SHIPPED | OUTPATIENT
Start: 2024-09-16

## 2024-09-16 ASSESSMENT — PAIN SCALES - GENERAL: PAINLEVEL: MILD PAIN (3)

## 2024-09-16 NOTE — PATIENT INSTRUCTIONS
You were seen in the Cardiology Clinic today by: Dr. Rascon    Plan:   Medication Changes:   INCREASE Spironolactone to 50 mg daily   Removed Amlodipine from medication list     Follow up Visit:  With Dr. Rascon in 6 months.     If you have further questions, please utilize Port[employer] to contact us.     Your Care Team:    Cardiology   Telephone Number     Nurse Line  Jose Lo RN    [phone]     For scheduling appointments:  [phone]           On-call cardiologist for after hours or on weekends:   [phone], option #4, and ask to speak to the on-call cardiologist.     Cardiovascular Clinic:   [address] [place] [address]. [place] [address]      As always, Thank you for trusting us with your health care needs!

## 2024-09-16 NOTE — NURSING NOTE
Current patient location: [address] [facility] [id number]    Is the patient currently in the state of [place]? YES    Visit mode:TELEPHONE    If the visit is dropped, the patient can be reconnected by: TELEPHONE VISIT: Phone number:   Telephone Information:   Mobile [phone]       Will anyone else be joining the visit? NO  (If patient encounters technical issues they should call [phone])    How would you like to obtain your AVS? MyChart    Are changes needed to the allergy or medication list? Pt declined med review and Pt stated no med changes    Are refills needed on medications prescribed by this physician? NO    Rooming Documentation:  Questionnaire(s) completed      Reason for visit: LEILA HOPE

## 2024-09-16 NOTE — LETTER
9/16/2024      RE: Nadira Perez  [address]  [facility] [address]       Dear Colleague,    Thank you for the opportunity to participate in the care of your patient, Nadira Perez, at the [facility] [place] [facility]. Please see a copy of my visit note below.    I had the pleasure of participating in coordination of clinical cardiovascular care for patient, Nadira Perez, via [facility]'s virtual visit protocol.    HISTORY:    Ms. Nadira Perez is a pleasant 73 year old female with a medical history notable for    Left-sided stage IIIC inflammatory breast cancer, ER/AL positive, HER2 negative, diagnosed in 2007.  Treated with neoadjuvant chemo (anthracycline), bilateral mastectomy (residual disease), radiation, and 14 years of endocrine therapy.   2. PVCs  3. History of stress cardiomyopathy with LVEF down to 35% on the day of mechanical fall 2/16/2024, now improved to LVEF 50 to 55%.  4. Hypertension  5. Dyslipidemia    She has stopped amlodipine dose due to edema. SBP at home 140-150 mmHg, HR 60-70s and no symptoms. She denies chest pain, dyspnea at rest, palpitations or syncope.       PAST MEDICAL HISTORY:  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     Hypopotassemia     Hyperlipidemia     Murmurs     Nephrolithiasis     Osteoarthrosis, hand     Personal history of other malignant neoplasm of skin     Inflamed seborrheic keratosis     Essential hypertension, benign     Age-related osteoporosis without current pathological fracture     Mechanical problems with limbs     Hypovitaminosis D     Contact dermatitis and other eczema, due to unspecified cause     Dermatitis     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     Corneal opacity     Hypothyroidism     Osteopenia, unspecified location     Aromatase inhibitor use     Chronic pain of  right knee     DDD (degenerative disc disease), lumbar     Degenerative scoliosis in adult patient     Carpal tunnel syndrome of right wrist     Peripheral neuropathy     Spinal stenosis of lumbar region with neurogenic claudication     Spondylolisthesis of lumbar region     Brain lesion     Dermatochalasis of both upper eyelids     S/P spinal fusion     Chronic bilateral low back pain     PVD (posterior vitreous detachment), left eye     Vitreous opacities of left eye     Closed nondisplaced fracture of lateral malleolus of left fibula, initial encounter     Acute left ankle pain     Sacroiliitis (H24)     Lumbar radiculopathy     Lumbosacral radiculopathy     Diabetes mellitus, type 2 (H)     Abnormal electrocardiogram     Ejection fraction < 50%     Closed fracture of sternum, unspecified portion of sternum, initial encounter     Congenital abnormality of ear     Sacroiliac joint pain     Stiffness of finger joint of right hand     Closed displaced fracture of neck of fifth metacarpal bone of right hand with routine healing, subsequent encounter        FAMILY HISTORY:  Family History   Problem Relation Age of Onset     Neurologic Disorder Mother         Anuerysm of Cerebral Artery, Dementia     Diabetes Mother      Thyroid Disease Mother         Hyperthyroidism (goiter)     Cerebrovascular Disease Mother         Hemorrhagic     Dementia Mother      Osteoporosis Mother      Hyperlipidemia Mother      Heart Disease Father         AAA     Hypertension Father      Coronary Artery Disease Father      Hyperlipidemia Father      Mental Illness Father         Schizophrenia?     Dementia Brother         hydrocephalis     Circulatory Brother         Perihperal Neurophathy     Diabetes Maternal Grandmother         Presumed Type 2     Asthma Maternal Grandmother      Chronic Obstructive Pulmonary Disease Maternal Grandfather         father     Asthma Maternal Grandfather      Diabetes Maternal Aunt         x2     Melanoma  Maternal Aunt      Glaucoma Maternal Aunt      Breast Cancer Cousin      Breast Cancer Cousin      Dementia Other      Cancer Other         malignant melanoma     Hypertension Other      Hypertension Other      Cerebrovascular Disease Other      Cerebrovascular Disease Other      Obesity Other      Respiratory Other      Cancer Other      Diabetes Other      Asthma Other      Other Cancer Other         Malignant melanoma     Obesity Other      Macular Degeneration No family hx of      Kidney Disease No family hx of      Thrombosis No family hx of      Arthritis No family hx of      Depression No family hx of      Substance Abuse No family hx of      Cystic Fibrosis No family hx of      Early Death No family hx of      Coronary Artery Disease Early Onset No family hx of      Heart Failure No family hx of      Bleeding Diathesis No family hx of      Ovarian Cancer No family hx of      Uterine Cancer No family hx of      Prostate Cancer No family hx of      Colorectal Cancer No family hx of      Pancreatic Cancer No family hx of      Lung Cancer No family hx of      Autoimmune Disease No family hx of      Unknown/Adopted No family hx of      Genetic Disorder No family hx of      Bleeding Disorder No family hx of      Clotting Disorder No family hx of      Anesthesia Reaction No family hx of        SOCIAL HISTORY:  Social History     Tobacco Use     Smoking status: Never     Passive exposure: Never     Smokeless tobacco: Never   Vaping Use     Vaping status: Never Used   Substance Use Topics     Alcohol use: Yes     Alcohol/week: 7.0 standard drinks of alcohol     Types: 7 Glasses of wine per week     Comment: Rare if ever     Drug use: Yes     Types: Marijuana     Comment: Smoke through water filter for chronic back pain PRN.        CURRENT MEDICATIONS:  Current Outpatient Medications   Medication Sig Dispense Refill     acetaminophen (TYLENOL) 500 MG tablet Take 1,000 mg by mouth 3 times daily       artificial tears  OINT ophthalmic ointment at bedtime       Ascorbic Acid (VITAMIN C) 500 MG CHEW Take 1 tablet by mouth every morning       atorvastatin (LIPITOR) 80 MG tablet Take 1 tablet (80 mg) by mouth daily 90 tablet 3     carvedilol (COREG) 12.5 MG tablet Take 1 tablet (12.5 mg) by mouth 2 times daily (with meals) 180 tablet 3     CRANBERRY EXTRACT PO Take 500 mg by mouth every morning       diclofenac (VOLTAREN) 1 % topical gel Apply 2 g topically 4 times daily 100 g 6     gabapentin (NEURONTIN) 300 MG capsule Take 4 capsules (1,200 mg) by mouth 3 times daily 1080 capsule 3     HEMP OIL OR EXTRACT OR OTHER CBD CANNABINOID, NOT MEDICAL CANNABIS, THC       hydrochlorothiazide (HYDRODIURIL) 25 MG tablet Take 1 tablet (25 mg) by mouth daily 90 day supply denied 30 tablet 2     levothyroxine (SYNTHROID/LEVOTHROID) 112 MCG tablet TAKE 1/2 TAB BY MOUTH EVERY DAY 45 tablet 3     lisinopril (ZESTRIL) 20 MG tablet Take 1 tablet (20 mg) by mouth 2 times daily. 180 tablet 0     MAGNESIUM GLYCINATE PO Take 480 mg by mouth daily       Melatonin 10 MG TABS tablet Take 10 mg by mouth at bedtime       Multiple Vitamin (MULTI-VITAMIN) per tablet Take 1 tablet by mouth every morning       ondansetron (ZOFRAN) 4 MG tablet Take 1 tablet (4 mg) by mouth every 6 hours as needed for nausea 12 tablet 0     ondansetron (ZOFRAN-ODT) 4 MG ODT tab Take 1 tablet (4 mg) by mouth every 12 hours as needed for nausea 180 tablet 3     spironolactone (ALDACTONE) 25 MG tablet Take 25 mg by mouth daily at 2 pm       tiZANidine (ZANAFLEX) 4 MG tablet Take 1 tablet (4 mg) by mouth at bedtime 90 tablet 1     Turmeric 500 MG CAPS Take 500 mg by mouth every morning         Exam: limited due to the nature of this visit   In general, the patient is in no acute distress.    Breathing is unlabored.     I have independently reviewed this patient's relevant laboratory and cardiac data :    Recent Labs   Lab Test 03/07/24  1451 04/15/22  1632   CHOL 214* 167   HDL 56 67    * 71   TRIG 237* 145      Recent Labs   Lab Test 11/30/23  1218 10/27/22  1428   AST 25 22     Recent Labs   Lab Test 11/30/23  1218 10/27/22  1428   ALT 21 16     Recent Labs   Lab Test 05/03/24  1143 04/05/24  1159    141   POTASSIUM 3.8 4.0   CHLORIDE 101 103   CO2 26 24   ANIONGAP 12 14   BUN 24.1* 33.4*   CR 0.89 0.90     Recent Labs   Lab Test 02/17/24  0640 02/16/24  1005   WBC 10.3 9.8   RBC 4.08 4.23   HGB 11.9 12.3   MCV 91 93    306     Recent Labs   Lab Test 03/07/24  1451 11/30/23  1218   A1C 6.2* 6.5*     Recent Labs   Lab Test 12/04/23  1642 11/30/23  1218   TSH 2.03 2.07     ECG June 2024 - sinus rhyhtm    CMR: 4/11/24  CONCLUSIONS:   1. Mildly reduced LV systolic function with LVEF of 50%.  2. Late gadolinium enhancement imaging shows focal area of mid inferolateral wall subendocardial delayed enhancement that could represent a small size infarct.    TTE: 4/2/24  Interpretation Summary  Left ventricular function is decreased. The ejection fraction is 50-55%  (borderline). Mid inferolateral akinesis is present.  Global right ventricular function is normal.  No pericardial effusion is present.    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 LCx infarction     Overall, she is doing well without angina or heart failure. She has findings of a small inferolateral infarction but since she is not symptomatic, coronary CTA/invasive angiogram was deferred. She is on a statin and is on antihypertensives. Due to her significant lower extremity edema and the gingival hyperplasia, the amlodipine was stopped . Review  of her recent home BP readings would allow up-titration of aldactone. She also informs me that she was recently diagnosed with sleep apnea and is waiting CPAP fitting.     Recommendations are -   Continue carvedilol 12.5 mg bid   Continue Lisinopril 20 mg bid  Stop amlodipine   Continue Hydrochlorothiazide 25mg daily   Continue Atorvastatin 80mg daily   Increase spironolactone to 50 mg daily   Follow up with Dr Rascon in 6 months    Telephone Visit Details:    Call duration: 15 minutes  In addition to visit time documented above, I spent an additional 15 minutes on data review and documentation.      Cinthia Rascon MD, MS  Professor of Medicine  Cardiovascular Medicine        Please do not hesitate to contact me if you have any questions/concerns.     Sincerely,     Cinthia Rascon MD

## 2024-09-16 NOTE — PROGRESS NOTES
I had the pleasure of participating in coordination of clinical cardiovascular care for patient, Nadira Perez, via [facility]'s virtual visit protocol.    HISTORY:    Ms. Nadira Perez is a pleasant 73 year old female with a medical history notable for    Left-sided stage IIIC inflammatory breast cancer, ER/MN positive, HER2 negative, diagnosed in 2007.  Treated with neoadjuvant chemo (anthracycline), bilateral mastectomy (residual disease), radiation, and 14 years of endocrine therapy.   2. PVCs  3. History of stress cardiomyopathy with LVEF down to 35% on the day of mechanical fall 2/16/2024, now improved to LVEF 50 to 55%.  4. Hypertension  5. Dyslipidemia    She has stopped amlodipine dose due to edema. SBP at home 140-150 mmHg, HR 60-70s and no symptoms. She denies chest pain, dyspnea at rest, palpitations or syncope.       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    Corneal opacity    Hypothyroidism    Osteopenia, unspecified location    Aromatase inhibitor use    Chronic pain of right knee    DDD (degenerative disc disease), lumbar    Degenerative scoliosis in adult patient    Carpal tunnel syndrome of right wrist    Peripheral neuropathy    Spinal stenosis of lumbar region with neurogenic claudication    Spondylolisthesis of lumbar region    Brain lesion    Dermatochalasis of both upper eyelids    S/P spinal fusion    Chronic bilateral low  back pain    PVD (posterior vitreous detachment), left eye    Vitreous opacities of left eye    Closed nondisplaced fracture of lateral malleolus of left fibula, initial encounter    Acute left ankle pain    Sacroiliitis (H24)    Lumbar radiculopathy    Lumbosacral radiculopathy    Diabetes mellitus, type 2 (H)    Abnormal electrocardiogram    Ejection fraction < 50%    Closed fracture of sternum, unspecified portion of sternum, initial encounter    Congenital abnormality of ear    Sacroiliac joint pain    Stiffness of finger joint of right hand    Closed displaced fracture of neck of fifth metacarpal bone of right hand with routine healing, subsequent encounter        FAMILY HISTORY:  Family History   Problem Relation Age of Onset    Neurologic Disorder Mother         Anuerysm of Cerebral Artery, Dementia    Diabetes Mother     Thyroid Disease Mother         Hyperthyroidism (goiter)    Cerebrovascular Disease Mother         Hemorrhagic    Dementia Mother     Osteoporosis Mother     Hyperlipidemia Mother     Heart Disease Father         AAA    Hypertension Father     Coronary Artery Disease Father     Hyperlipidemia Father     Mental Illness Father         Schizophrenia?    Dementia Brother         hydrocephalis    Circulatory Brother         Perihperal Neurophathy    Diabetes Maternal Grandmother         Presumed Type 2    Asthma Maternal Grandmother     Chronic Obstructive Pulmonary Disease Maternal Grandfather         father    Asthma Maternal Grandfather     Diabetes Maternal Aunt         x2    Melanoma Maternal Aunt     Glaucoma Maternal Aunt     Breast Cancer Cousin     Breast Cancer Cousin     Dementia Other     Cancer Other         malignant melanoma    Hypertension Other     Hypertension Other     Cerebrovascular Disease Other     Cerebrovascular Disease Other     Obesity Other     Respiratory Other     Cancer Other     Diabetes Other     Asthma Other     Other Cancer Other         Malignant melanoma     Obesity Other     Macular Degeneration No family hx of     Kidney Disease No family hx of     Thrombosis No family hx of     Arthritis No family hx of     Depression No family hx of     Substance Abuse No family hx of     Cystic Fibrosis No family hx of     Early Death No family hx of     Coronary Artery Disease Early Onset No family hx of     Heart Failure No family hx of     Bleeding Diathesis No family hx of     Ovarian Cancer No family hx of     Uterine Cancer No family hx of     Prostate Cancer No family hx of     Colorectal Cancer No family hx of     Pancreatic Cancer No family hx of     Lung Cancer No family hx of     Autoimmune Disease No family hx of     Unknown/Adopted No family hx of     Genetic Disorder No family hx of     Bleeding Disorder No family hx of     Clotting Disorder No family hx of     Anesthesia Reaction No family hx of        SOCIAL HISTORY:  Social History     Tobacco Use    Smoking status: Never     Passive exposure: Never    Smokeless tobacco: Never   Vaping Use    Vaping status: Never Used   Substance Use Topics    Alcohol use: Yes     Alcohol/week: 7.0 standard drinks of alcohol     Types: 7 Glasses of wine per week     Comment: Rare if ever    Drug use: Yes     Types: Marijuana     Comment: Smoke through water filter for chronic back pain PRN.        CURRENT MEDICATIONS:  Current Outpatient Medications   Medication Sig Dispense Refill    acetaminophen (TYLENOL) 500 MG tablet Take 1,000 mg by mouth 3 times daily      artificial tears OINT ophthalmic ointment at bedtime      Ascorbic Acid (VITAMIN C) 500 MG CHEW Take 1 tablet by mouth every morning      atorvastatin (LIPITOR) 80 MG tablet Take 1 tablet (80 mg) by mouth daily 90 tablet 3    carvedilol (COREG) 12.5 MG tablet Take 1 tablet (12.5 mg) by mouth 2 times daily (with meals) 180 tablet 3    CRANBERRY EXTRACT PO Take 500 mg by mouth every morning      diclofenac (VOLTAREN) 1 % topical gel Apply 2 g topically 4 times daily 100 g 6     gabapentin (NEURONTIN) 300 MG capsule Take 4 capsules (1,200 mg) by mouth 3 times daily 1080 capsule 3    HEMP OIL OR EXTRACT OR OTHER CBD CANNABINOID, NOT MEDICAL CANNABIS, THC      hydrochlorothiazide (HYDRODIURIL) 25 MG tablet Take 1 tablet (25 mg) by mouth daily 90 day supply denied 30 tablet 2    levothyroxine (SYNTHROID/LEVOTHROID) 112 MCG tablet TAKE 1/2 TAB BY MOUTH EVERY DAY 45 tablet 3    lisinopril (ZESTRIL) 20 MG tablet Take 1 tablet (20 mg) by mouth 2 times daily. 180 tablet 0    MAGNESIUM GLYCINATE PO Take 480 mg by mouth daily      Melatonin 10 MG TABS tablet Take 10 mg by mouth at bedtime      Multiple Vitamin (MULTI-VITAMIN) per tablet Take 1 tablet by mouth every morning      ondansetron (ZOFRAN) 4 MG tablet Take 1 tablet (4 mg) by mouth every 6 hours as needed for nausea 12 tablet 0    ondansetron (ZOFRAN-ODT) 4 MG ODT tab Take 1 tablet (4 mg) by mouth every 12 hours as needed for nausea 180 tablet 3    spironolactone (ALDACTONE) 25 MG tablet Take 25 mg by mouth daily at 2 pm      tiZANidine (ZANAFLEX) 4 MG tablet Take 1 tablet (4 mg) by mouth at bedtime 90 tablet 1    Turmeric 500 MG CAPS Take 500 mg by mouth every morning         Exam: limited due to the nature of this visit   In general, the patient is in no acute distress.    Breathing is unlabored.     I have independently reviewed this patient's relevant laboratory and cardiac data :    Recent Labs   Lab Test 03/07/24  1451 04/15/22  1632   CHOL 214* 167   HDL 56 67   * 71   TRIG 237* 145      Recent Labs   Lab Test 11/30/23  1218 10/27/22  1428   AST 25 22     Recent Labs   Lab Test 11/30/23  1218 10/27/22  1428   ALT 21 16     Recent Labs   Lab Test 05/03/24  1143 04/05/24  1159    141   POTASSIUM 3.8 4.0   CHLORIDE 101 103   CO2 26 24   ANIONGAP 12 14   BUN 24.1* 33.4*   CR 0.89 0.90     Recent Labs   Lab Test 02/17/24  0640 02/16/24  1005   WBC 10.3 9.8   RBC 4.08 4.23   HGB 11.9 12.3   MCV 91 93    306     Recent  Labs   Lab Test 03/07/24  1451 11/30/23  1218   A1C 6.2* 6.5*     Recent Labs   Lab Test 12/04/23  1642 11/30/23  1218   TSH 2.03 2.07     ECG June 2024 - sinus rhyhtm    CMR: 4/11/24  CONCLUSIONS:   1. Mildly reduced LV systolic function with LVEF of 50%.  2. Late gadolinium enhancement imaging shows focal area of mid inferolateral wall subendocardial delayed enhancement that could represent a small size infarct.    TTE: 4/2/24  Interpretation Summary  Left ventricular function is decreased. The ejection fraction is 50-55%  (borderline). Mid inferolateral akinesis is present.  Global right ventricular function is normal.  No pericardial effusion is present.    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 LCx infarction     Overall, she is doing well without angina or heart failure. She has findings of a small inferolateral infarction but since she is not symptomatic, coronary CTA/invasive angiogram was deferred. She is on a statin and is on antihypertensives. Due to her significant lower extremity edema and the gingival hyperplasia, the amlodipine was stopped . Review of her recent home BP readings would allow up-titration of aldactone. She also informs me that she was recently diagnosed with sleep apnea and is waiting CPAP fitting.     Recommendations are -   Continue carvedilol 12.5 mg bid   Continue Lisinopril 20 mg bid  Stop amlodipine   Continue Hydrochlorothiazide 25mg daily   Continue Atorvastatin 80mg daily   Increase spironolactone to 50 mg daily   Follow up with Dr Rascon in 6 months    Telephone Visit Details:    Call duration:  15 minutes  In addition to visit time documented above, I spent an additional 15 minutes on data review and documentation.      Cinthia Rascon MD, MS  Professor of Medicine  Cardiovascular Medicine

## 2024-09-17 RX ORDER — AMLODIPINE BESYLATE 2.5 MG/1
2.5 TABLET ORAL DAILY
Qty: 90 TABLET | Refills: 1 | OUTPATIENT
Start: 2024-09-17

## 2024-09-18 ENCOUNTER — OFFICE VISIT (OUTPATIENT)
Dept: ORTHOPEDICS | Facility: CLINIC | Age: 74
End: 2024-09-18
Payer: COMMERCIAL

## 2024-09-18 DIAGNOSIS — S62.336D CLOSED DISPLACED FRACTURE OF NECK OF FIFTH METACARPAL BONE OF RIGHT HAND WITH ROUTINE HEALING, SUBSEQUENT ENCOUNTER: Primary | ICD-10-CM

## 2024-09-18 PROCEDURE — 99213 OFFICE O/P EST LOW 20 MIN: CPT | Performed by: FAMILY MEDICINE

## 2024-09-18 NOTE — PROGRESS NOTES
HISTORY OF PRESENT ILLNESS  Ms. Perez is a pleasant 74 year old female following up with a 5th metacarpal fracture.  Ismael has been wearing the wrist brace and dorian taping. She also has been going to hand therapy.  She is generally feeling better with regards to her pain.  She does notice that her fifth finger will deviate to the ulnar side after her fracture.       PHYSICAL EXAM  General  - normal appearance, in no obvious distress     Musculoskeletal -right fifth finger  - inspection: Hyperthenar atrophy, 5th finger will deviate medially   - palpation: Tender fifth metacarpal neck  - ROM: Restricted in MCP flexion and extension  Neuro  - no numbness, no motor deficit, grossly normal coordination, normal muscle tone  Skin  - no ecchymosis, erythema, warmth, or induration, no obvious rash      ASSESSMENT & PLAN  Ms. Perez is a 74 year old female following up with 5th metacarpal fracture.    I reviewed her CT in the room with her:  IMPRESSION:  1.  Incompletely healed fracture of the distal fifth metacarpal with  unchanged alignment.  2.  Findings of a prior scaphoidectomy and 4 corner fusion. No  evidence of bony bridging across the intercarpal joints.  3.  Marked dorsal angulation of the lunate with dorsal subluxation and  proximal migration of the capitate suggesting superimposed dorsal  intercalated segmental instability.  4.  Marked flexor tenosynovitis.    Overall her findings are in accordance with her fracture recovery thus far.  We did discuss that her ulnar deviation very well may be secondary to muscular issues, which may be expected to some degree after being in a cast.    I do think it would greatly benefit Ismael to continue with hand therapy.  We also gave her dorian loops again today to wear as helpful.    If her symptoms are not improving whatsoever I may consider referring her for second opinion to discuss her ulnar deviation.    It was a pleasure seeing Nadira.        Willis Morgan DO,  CAQSM      This note was constructed using Dragon dictation software, please excuse any minor errors in spelling, grammar, or syntax.

## 2024-09-18 NOTE — LETTER
9/18/2024      RE: Nadira Perez  [id number]  [facility] [address]     Dear Colleague,    Thank you for referring your patient, Nadira Perez, to the [facility] [place]. Please see a copy of my visit note below.    HISTORY OF PRESENT ILLNESS  Ms. Perez is a pleasant 74 year old female following up with a 5th metacarpal fracture.  Ismael has been wearing the wrist brace and dorian taping. She also has been going to hand therapy.  She is generally feeling better with regards to her pain.  She does notice that her fifth finger will deviate to the ulnar side after her fracture.       PHYSICAL EXAM  General  - normal appearance, in no obvious distress     Musculoskeletal -right fifth finger  - inspection: Hyperthenar atrophy, 5th finger will deviate medially   - palpation: Tender fifth metacarpal neck  - ROM: Restricted in MCP flexion and extension  Neuro  - no numbness, no motor deficit, grossly normal coordination, normal muscle tone  Skin  - no ecchymosis, erythema, warmth, or induration, no obvious rash      ASSESSMENT & PLAN  Ms. Perez is a 74 year old female following up with 5th metacarpal fracture.    I reviewed her CT in the room with her:  IMPRESSION:  1.  Incompletely healed fracture of the distal fifth metacarpal with  unchanged alignment.  2.  Findings of a prior scaphoidectomy and 4 corner fusion. No  evidence of bony bridging across the intercarpal joints.  3.  Marked dorsal angulation of the lunate with dorsal subluxation and  proximal migration of the capitate suggesting superimposed dorsal  intercalated segmental instability.  4.  Marked flexor tenosynovitis.    Overall her findings are in accordance with her fracture recovery thus far.  We did discuss that her ulnar deviation very well may be secondary to muscular issues, which may be expected to some degree after being in a cast.    I do think it would greatly benefit Ismael to continue with hand  therapy.  We also gave her dorian loops again today to wear as helpful.    If her symptoms are not improving whatsoever I may consider referring her for second opinion to discuss her ulnar deviation.    It was a pleasure seeing Nadira.        Willis Morgan DO, CAMARCELINO      This note was constructed using Dragon dictation software, please excuse any minor errors in spelling, grammar, or syntax.      Again, thank you for allowing me to participate in the care of your patient.      Sincerely,    Willis Morgan DO

## 2024-09-24 ENCOUNTER — DOCUMENTATION ONLY (OUTPATIENT)
Dept: SLEEP MEDICINE | Facility: CLINIC | Age: 74
End: 2024-09-24
Payer: COMMERCIAL

## 2024-09-24 DIAGNOSIS — G47.33 OSA (OBSTRUCTIVE SLEEP APNEA): Primary | ICD-10-CM

## 2024-09-24 NOTE — PROGRESS NOTES
Patient was offered choice of vendor and chose [facility].  Patient Nadira Perez was set up at [facility] on September 24, 2024. Patient received a Resmed Airsense 10 Pressures were set at  5-15 cm H2O.   Patient s ramp is 5 cm H2O for Auto and FLEX/EPR is EPR, 2.  Patient received a Resmed Mask name: Airfit P10 Pillow mask size Medium, Standard, heated tubing and heated humidifier.  Patient has the following compliance requirements: using and visit requirements  Patient has a follow up on 11.5.24 with Dr. Marquez.    Tracy L Fahrenkamp

## 2024-09-26 ENCOUNTER — THERAPY VISIT (OUTPATIENT)
Dept: OCCUPATIONAL THERAPY | Facility: CLINIC | Age: 74
End: 2024-09-26
Payer: COMMERCIAL

## 2024-09-26 DIAGNOSIS — M25.641 STIFFNESS OF FINGER JOINT OF RIGHT HAND: Primary | ICD-10-CM

## 2024-09-26 DIAGNOSIS — S62.336D CLOSED DISPLACED FRACTURE OF NECK OF FIFTH METACARPAL BONE OF RIGHT HAND WITH ROUTINE HEALING, SUBSEQUENT ENCOUNTER: ICD-10-CM

## 2024-09-26 PROCEDURE — 97140 MANUAL THERAPY 1/> REGIONS: CPT | Mod: GO | Performed by: OCCUPATIONAL THERAPIST

## 2024-09-26 PROCEDURE — 97110 THERAPEUTIC EXERCISES: CPT | Mod: GO | Performed by: OCCUPATIONAL THERAPIST

## 2024-09-26 NOTE — PROGRESS NOTES
PHYSICAL THERAPY EVALUATION  Type of Visit: Evaluation    Fall Risk Screen:  Have you fallen 2 or more times in the past year?: Yes  Have you fallen and had an injury in the past year?: Yes  Is patient a fall risk?: No    Subjective       Presenting condition or subjective complaint: PT dave after fracture of right 5th metacarpal  Date of onset: 09/06/24 (order date due to chronicity of problem)    Relevant medical history: Anemia; Arthritis; Bladder or bowel problems; Cancer; Diabetes; Dizziness; Fibromyalgia; Foot drop; Hearing problems; Heart problems; Hepatitis; High blood pressure; History of fractures; Menopause; Osteoarthritis; Osteoporosis; Overweight; Pain at night or rest; Progressive neurological deficits; Radiation treatment; Severe dizziness; Thyroid problems; Vision problems   Dates & types of surgery: See medical record.  Too many to list.    Prior diagnostic imaging/testing results: X-ray     Prior therapy history for the same diagnosis, illness or injury: No      Prior Level of Function  Transfers:   Ambulation:   ADL:   IADL:     Living Environment  Social support: Alone   Type of home: [facility]   Stairs to enter the home: No       Ramp: No   Stairs inside the home: Yes 13 Is there a railing: Yes     Help at home: Home management tasks (cooking, cleaning); Home and Yard maintenance tasks  Equipment owned: Straight Cane; Four-point cane; Walker with wheels; Standard wheelchair; Bath bench     Employment: Yes Part time nurse consultant (self-employed)  Hobbies/Interests: Reading, old movies, some computer games, volunteering    Patient goals for therapy: Obtain most of my dominant hand use as possible.    Pain assessment:     Pt does pilates reformer 1-2 per week, exercise bike x 30 min x 2 per week, calf stretch, heel raises; has therabands but does not do consistently    4ww in community or SEC; nothing at home- short distances     Objective      Cognitive Status Examination  Orientation: Oriented  to person, place and time   Level of Consciousness: Alert  Follows Commands and Answers Questions: 100% of the time  Personal Safety and Judgement: Intact  Memory: Intact    OBSERVATION:   INTEGUMENTARY: Intact  POSTURE:   PALPATION:   RANGE OF MOTION:  B ankle stiffness noted into dorsiflexion R>L  STRENGTH:   Pain: - none + mild ++ moderate +++ severe  Strength Scale: 0-5/5 Left Right   Hip Flexion 4+ 5-   Hip Extension     Hip Abduction     Hip Adduction     Hip Internal Rotation     Hip External Rotation 3+ pain 4   Knee Flexion     Knee Extension 5 5   Ankle DF: R 3-/5 L 5/5    BED MOBILITY:     TRANSFERS: Independent widen OLGA with STS     WHEELCHAIR MOBILITY: n/a    GAIT:   Level of [place]: Independent  Assistive Device(s):  short distance without assistive device, longer distances with 4ww or SEC. Pt brought 4ww to session today  Gait Deviations: Base of support increased  Stance time decreased  Stride length decreased  Ariane decreased  AFO on R LE due to foot drop  Gait Distance: 30 ft  Stairs: not assessed    BALANCE:     SPECIAL TESTS  Functional Gait Assessment (FGA)      10 Meter Walk Test (Comfortable)  Without assistive device: 1.02 m/s; with 4ww: 0.89 m/s   10 Meter Walk Test (Fast)     6 Minute Walk Test (6MWT)           Wang Balance Scale (BBS)     5 Times Sit-to-Stand (5TSTS)  20.29 sec, standard height     Dynamic Gait Index (DGI)     Timed Up and Go (TUG) - sec    Single Leg Stance Right (sec)    Single Leg Stance Left (sec)    Modified CTSIB Conditions (sec) Cond 1: 30 sec  Cond 2: 29 sec  Cond 4: 30 sec  Cond 5 : 1 sec   Romberg  (sec)    Sharpened Romberg (sec)    30 Second Sit to Stand (reps/height)    Mini-BESTest              SENSATION:  vince neuropathy in feet    REFLEXES:   COORDINATION:   MUSCLE TONE:         Assessment & Plan   CLINICAL IMPRESSIONS  Medical Diagnosis: Right leg weakness  Right foot drop    Treatment Diagnosis: Right leg weakness  Right foot drop, impairment of  balance with household/community activities   Impression/Assessment: Patient is a 74 year old female with vince LE weakness, R foot drop and balance/gait impairments complaints.  The following significant findings have been identified: Pain, Decreased ROM/flexibility, Decreased strength, Impaired balance, Decreased proprioception, Impaired sensation, Edema, Impaired gait, Decreased activity tolerance, Impaired posture, and Dizziness. These impairments interfere with their ability to perform self care tasks, recreational activities, household chores, household mobility, and community mobility as compared to previous level of function.     Clinical Decision Making (Complexity):  Clinical Presentation: Stable/Uncomplicated  Clinical Presentation Rationale: based on medical and personal factors listed in PT evaluation  Clinical Decision Making (Complexity): Low complexity    PLAN OF CARE  Treatment Interventions:  Modalities: Cryotherapy, Hot Pack  Interventions: Gait Training, Manual Therapy, Neuromuscular Re-education, Therapeutic Activity, Therapeutic Exercise, Self-Care/Home Management, Canalith Repositioning    Long Term Goals     PT Goal 1  Goal Identifier: HEP  Goal Description: Pt will demonstrate IND with strength, balance, and muscular and aerobic endurance HEP, while working to improve capacity for functional mobility.  Goal Progress: Initiated at evaluation; see PTRx.  Target Date: 11/28/24  PT Goal 2  Goal Identifier: 5xSTS  Goal Description: Pt will complete x5 STS in <12s without use of B UEs, to demonstrate increased B LE strength and reduced risk for falling.  Target Date: 11/28/24  PT Goal 3  Goal Identifier: FGA  Goal Description: Pt will score >22/30pts on FGA, to demonstrate improved dynamic balance and postural control with ambulation and decreased risk for falling with functional mobility.  Target Date: 11/28/24  PT Goal 4  Goal Identifier: 10 MWT  Goal Description: Patient will achieve MDC for  self-selected gait speeds to show improved gait efficiency, gurinder, and mechanics to reduce risk for falling.  Goal Progress: baseline: comf=1.02m/s,  at inc risk for falling.  Target Date: 11/28/24      Frequency of Treatment: 1x/week  Duration of Treatment: 60 days    Recommended Referrals to Other Professionals:   Education Assessment:   Learner/Method: Patient;Listening;Demonstration;Pictures/Video;Reading;No Barriers to Learning    Risks and benefits of evaluation/treatment have been explained.   Patient/Family/caregiver agrees with Plan of Care.     Evaluation Time:     PT Eval, Low Complexity Minutes (94625): 30       Signing Clinician: Lorena Alejandro, PT        [facility]                                                                                   OUTPATIENT PHYSICAL THERAPY      PLAN OF TREATMENT FOR OUTPATIENT REHABILITATION   Patient's Last Name, First Name, MARCELINOAUGUSTIN  PerezNadira YOB: 1950   Provider's Name   [facility]   Medical Record No.  [id number]     Onset Date: 09/06/24 (order date due to chronicity of problem)  Start of Care Date: 09/30/24     Medical Diagnosis:  Right leg weakness  Right foot drop      PT Treatment Diagnosis:  Right leg weakness  Right foot drop, impairment of balance with household/community activities Plan of Treatment  Frequency/Duration: 1x/week/ 60 days    Certification date from 09/30/24 to 11/28/24         See note for plan of treatment details and functional goals     Lorena Alejandro, PT                         I CERTIFY THE NEED FOR THESE SERVICES FURNISHED UNDER        THIS PLAN OF TREATMENT AND WHILE UNDER MY CARE     (Physician attestation of this document indicates review and certification of the therapy plan).              Referring Provider:  Renée Harris    Initial Assessment  See Epic Evaluation- Start of Care Date: 09/30/24

## 2024-09-26 NOTE — PROGRESS NOTES
ELECTROPHYSIOLOGY CLINIC VISIT    Assessment/Recommendations   Assessment/Plan:    Ms. Perez is a 74 year old female with a PMH most remarkable for essential HTN and breast cancer (status post surgery, chemo, and radiation; now in remission). She also has frequent PVCs. She has been following with Dr. Rolle who referred her for evaluation of PVCs.    Premature Ventricular Contractions:   We had a long discussion with patient regarding PVCs. We discussed various options for treatment of PVCs. In a structurally normal heart, PVCs are considered relatively benign. Treatment is therefore indicated primarily for relief of symptoms or if associated with a cardiomyopathy. Medications such as calcium channel blockers or beta blockers in some cases reduce the frequency and duration of the episodes. The other option discussed was an electrophysiology study (EPS) and possible ablation. The procedural risk of EPS and PVC ablation were discussed in detail. Risks discussed include: vascular complications, CVA, AVB, pericardial effusion and tamponade. We also discussed that if PVC burden is somewhat variable, it is possible that there would be sufficient amount of PVCs during procedure to allow for adequate mapping. We also discussed that at times the PVCs will be traced to an origin in the heart where it is not safe to proceed with ablation. Overall, we agreed that given she has preserved LVEF and very minimal symptoms, we would continue with conservative measures. If she develops any exertional symptoms, low threshold for ischemic work up.     She will continue to follow with Dr. Rascon.   Follow up with EP on an as needed basis.          History of Present Illness/Subjective    Ms. Nadira Perez is a 74 year old female who comes in today for EP consultation of PVCs.    Ms. Perez is a 74 year old female with a PMH most remarkable for essential HTN and breast cancer (status post surgery, chemo, and radiation; now in  remission). She also has frequent PVCs. She has been following with Dr. Rolle who referred her for evaluation of PVCs.     EP Visit 3/13/24: Patient tells me that she recently was admitted to [facility] with a fall and sternal fracture. Troponin was measured which led to an echocardiogram. Her echocardiogram showed a decreased LVEF. She was optimized on GDMT and discharged. She then followed up with Dr. Rolle and her LVEF was improving. It was Dr. Rolle's impression that this was stress induced. She mentions she had PVCs for 15-20 years, not very symptomatic. The PVCs are mostly monomorphic but has 2 morphologies. She never had an MRI yet. EKG Nov 9th 2023 PVCs look AMC origin, sometimes more negative in V1, liely due to lead placement variation    She reports feeling well. She has some mild palpitation with PVCs. She denies any exertional limitations or symptoms. She notes she had positive sleep study and was recently started on CPAP. She denies chest discomfort, palpitations, abdominal fullness/bloating or peripheral edema, shortness of breath, paroxysmal nocturnal dyspnea, orthopnea, lightheadedness, dizziness, pre-syncope, or syncope. A CMR from 4/2024 showed LVEF 50%, LGE in mid inferolateral wall and subendocaridal. Presenting 12 lead ECG shows NSR Vent Rate 73 bpm,  ms,  ms, QTc 464 ms.Current cardiac medications include: Spironolactone, Lisinopril, Hydrochlorothiazide, Coreg,and Lipitor.           I have reviewed and updated the patient's Past Medical History, Social History, Family History and Medication List.     Cardiographics (Personally Reviewed) :   3/6/2024 Echo  Left ventricular function is decreased. The ejection fraction is 50-55% (borderline).  The left ventricular function has improved.    Echo from 2/16/2024  The left ventricle is mildly dilated.  The visual ejection fraction is estimated at 35%.  The right ventricle is normal in structure, function and size.  Aortic root dilatation  is present.  TDS- fractured sternum. Fat pad vs. effusion(small)  Clinical correlation required The study was technically difficult. Compared to prior study, changes are noted.    4/11/24 CMR:  1. The LV is normal in cavity size. The global systolic function is mildly reduced. The LVEF is 50 %. Mid  inferolateral wall thinned out with severe hypokinesis     2. The RV is normal in cavity size. The global systolic function is borderline reduced. The RVEF is 55 %.      3. Late gadolinium enhancement imaging shows mid inferolateral wall subendocardial delayed enhancement that  could represent a small size infarct.     4. There is no pericardial effusion.     5. There is no intracardiac thrombus.     6. No evidence of myocardial iron overload.     7. Multiple renal cysts noted.        CONCLUSIONS:   1. Mildly reduced LV systolic function with LVEF of 50%.  2. Late gadolinium enhancement imaging shows mid inferolateral wall subendocardial delayed enhancement that  could represent a small size infarct.    Ziopatch from 11/14/23 - 11/21/23        ECG from 12/2023 with PVC         Physical Examination   /73 (BP Location: Right arm, Patient Position: Supine, Cuff Size: Adult Regular)   Pulse 75   LMP  (LMP Unknown)   SpO2 98%   Wt Readings from Last 3 Encounters:   09/10/24 79.7 kg (175 lb 11.2 oz)   09/06/24 79.7 kg (175 lb 11.2 oz)   08/16/24 81.5 kg (179 lb 9.6 oz)     General Appearance:   Alert, well-appearing and in no acute distress.   HEENT: Atraumatic, normocephalic. PERRL.  MMM.   Chest/Lungs:   Respirations unlabored.  Lungs are clear to auscultation.   Cardiovascular:   Regular rate and rhythm.  S1/S2. No murmur.    Abdomen:  Soft, nontender, nondistended.   Extremities: No cyanosis or clubbing. No edema.    Musculoskeletal: Moves all extremities.  Gait normal.   Skin: Warm, dry, intact.    Neurologic: Mood and affect are appropriate.  Alert and oriented to person, place, time, and situation.           Medications  Allergies   Current Outpatient Medications   Medication Sig Dispense Refill    acetaminophen (TYLENOL) 500 MG tablet Take 1,000 mg by mouth 3 times daily      artificial tears OINT ophthalmic ointment at bedtime      Ascorbic Acid (VITAMIN C) 500 MG CHEW Take 1 tablet by mouth every morning      atorvastatin (LIPITOR) 80 MG tablet Take 1 tablet (80 mg) by mouth daily 90 tablet 3    carvedilol (COREG) 12.5 MG tablet Take 1 tablet (12.5 mg) by mouth 2 times daily (with meals) 180 tablet 3    CRANBERRY EXTRACT PO Take 500 mg by mouth every morning      diclofenac (VOLTAREN) 1 % topical gel Apply 2 g topically 4 times daily 100 g 6    gabapentin (NEURONTIN) 300 MG capsule Take 4 capsules (1,200 mg) by mouth 3 times daily 1080 capsule 3    HEMP OIL OR EXTRACT OR OTHER CBD CANNABINOID, NOT MEDICAL CANNABIS, THC      hydrochlorothiazide (HYDRODIURIL) 25 MG tablet Take 1 tablet (25 mg) by mouth daily 90 day supply denied 30 tablet 2    levothyroxine (SYNTHROID/LEVOTHROID) 112 MCG tablet TAKE 1/2 TAB BY MOUTH EVERY DAY 45 tablet 3    lisinopril (ZESTRIL) 20 MG tablet Take 1 tablet (20 mg) by mouth 2 times daily. 180 tablet 0    MAGNESIUM GLYCINATE PO Take 480 mg by mouth daily      Melatonin 10 MG TABS tablet Take 10 mg by mouth at bedtime      Multiple Vitamin (MULTI-VITAMIN) per tablet Take 1 tablet by mouth every morning      ondansetron (ZOFRAN) 4 MG tablet Take 1 tablet (4 mg) by mouth every 6 hours as needed for nausea 12 tablet 0    ondansetron (ZOFRAN-ODT) 4 MG ODT tab Take 1 tablet (4 mg) by mouth every 12 hours as needed for nausea 180 tablet 3    spironolactone (ALDACTONE) 50 MG tablet Take 1 tablet (50 mg) by mouth daily at 2 pm. 90 tablet 2    tiZANidine (ZANAFLEX) 4 MG tablet Take 1 tablet (4 mg) by mouth at bedtime 90 tablet 1    Turmeric 500 MG CAPS Take 500 mg by mouth every morning      Allergies   Allergen Reactions    Erythromycin Nausea    Penicillins Hives     Around age 6 - doesn't  recall full reaction, mother told her it was hives.     Has tolerated cephalsporins.          Lab Results (Personally Reviewed)    Chemistry/lipid CBC Cardiac Enzymes/BNP/TSH/INR   Lab Results   Component Value Date    BUN 24.1 (H) 05/03/2024     05/03/2024    CO2 26 05/03/2024     Creatinine   Date Value Ref Range Status   07/23/2024 0.75 0.51 - 0.95 mg/dL Final   05/07/2021 0.73 0.52 - 1.04 mg/dL Final   05/07/2021 0.74 0.52 - 1.04 mg/dL Final       Lab Results   Component Value Date    CHOL 214 (H) 03/07/2024    HDL 56 03/07/2024     (H) 03/07/2024      Lab Results   Component Value Date    WBC 10.3 02/17/2024    HGB 11.9 02/17/2024    HCT 37.3 02/17/2024    MCV 91 02/17/2024     02/17/2024    Lab Results   Component Value Date    TSH 2.03 12/04/2023    INR 0.85 (L) 08/20/2010        The patient states understanding and is agreeable with the plan.   Kvng Lyon MD [facility]  Cardiology - Electrophysiology      Total time spent on patient visit, reviewing notes, imaging, labs, orders, and completing necessary documentation: 30 minutes.

## 2024-09-27 ENCOUNTER — DOCUMENTATION ONLY (OUTPATIENT)
Dept: SLEEP MEDICINE | Facility: CLINIC | Age: 74
End: 2024-09-27
Payer: COMMERCIAL

## 2024-09-27 ENCOUNTER — OFFICE VISIT (OUTPATIENT)
Dept: CARDIOLOGY | Facility: CLINIC | Age: 74
End: 2024-09-27
Attending: INTERNAL MEDICINE
Payer: COMMERCIAL

## 2024-09-27 ENCOUNTER — THERAPY VISIT (OUTPATIENT)
Dept: PHYSICAL THERAPY | Facility: CLINIC | Age: 74
End: 2024-09-27
Payer: COMMERCIAL

## 2024-09-27 VITALS — DIASTOLIC BLOOD PRESSURE: 73 MMHG | OXYGEN SATURATION: 98 % | HEART RATE: 75 BPM | SYSTOLIC BLOOD PRESSURE: 118 MMHG

## 2024-09-27 DIAGNOSIS — I89.0 LYMPHEDEMA OF LEFT UPPER EXTREMITY: Primary | ICD-10-CM

## 2024-09-27 DIAGNOSIS — I89.0 LYMPHEDEMA OF BOTH LOWER EXTREMITIES: ICD-10-CM

## 2024-09-27 DIAGNOSIS — Z91.81 H/O FALL: ICD-10-CM

## 2024-09-27 DIAGNOSIS — I49.3 FREQUENT PVCS: Primary | ICD-10-CM

## 2024-09-27 DIAGNOSIS — R26.89 IMPAIRMENT OF BALANCE: ICD-10-CM

## 2024-09-27 DIAGNOSIS — R53.1 DECREASED STRENGTH: ICD-10-CM

## 2024-09-27 DIAGNOSIS — L90.5 SCAR CONDITION AND FIBROSIS OF SKIN: ICD-10-CM

## 2024-09-27 DIAGNOSIS — R53.81 PHYSICAL DECONDITIONING: ICD-10-CM

## 2024-09-27 PROCEDURE — 93005 ELECTROCARDIOGRAM TRACING: CPT

## 2024-09-27 PROCEDURE — 99214 OFFICE O/P EST MOD 30 MIN: CPT | Performed by: INTERNAL MEDICINE

## 2024-09-27 PROCEDURE — G0463 HOSPITAL OUTPT CLINIC VISIT: HCPCS | Performed by: INTERNAL MEDICINE

## 2024-09-27 PROCEDURE — 97140 MANUAL THERAPY 1/> REGIONS: CPT | Mod: GP | Performed by: PHYSICAL THERAPIST

## 2024-09-27 PROCEDURE — 97110 THERAPEUTIC EXERCISES: CPT | Mod: GP | Performed by: PHYSICAL THERAPIST

## 2024-09-27 ASSESSMENT — PAIN SCALES - GENERAL: PAINLEVEL: NO PAIN (0)

## 2024-09-27 NOTE — PROGRESS NOTES
09/27/24 0500   Appointment Info   Signing clinician's name / credentials SHEN Davey, NATALEE   Total/Authorized Visits [facility] Medicare Advantage ; certification   Visits Used 13   Medical Diagnosis lymphedema   PT Tx Diagnosis lymphedema, fibrosis, pain, impaired balance, decreased strength   Precautions/Limitations osteoporosis; h/o multiple falls and fractures   Other pertinent information L breast cancer 2007 s/p B mastectomy, chemo and radiation; Anemia; Arthritis; Bladder or bowel problems; Cancer; Dizziness; Fibromyalgia; Foot drop; Hearing problems; Heart problems; Hepatitis; High blood pressure; History of fractures; Menopause; Osteoarthritis; Osteoporosis; Overweight; Pain at night or rest; Progressive neurological deficits; Radiation treatment; Severe dizziness; Thyroid problems; Vision problems  pt stating exacerbation of L UE lyphema due to pain of wrists, falls with fracture and inability to transition to planned garments and bandaging 1x/week.   Progress Note/Certification   Start of Care Date 12/05/23   Onset of illness/injury or Date of Surgery 11/02/23  (date of order Ashley Perry PA-C; Survivorship clinic)   Therapy Frequency 1 visit within 2 weeks then 4x/week x 2 weeks then 1x/week x 2 weeks then 1x/month   Predicted Duration 90 days   Certification date from 09/25/24   Certification date to 12/23/24   Progress Note Completed Date 06/28/24   PT Goal 1   Goal Identifier Home program   Goal Description Patient will be independent in home program to manage conditions of lymphedema and fibrosis, impaired balance.   Rationale to maximize safety and independence with performance of ADLs and functional tasks;to maximize safety and independence within the home;to maximize safety and independence within the community;to maximize safety and independence with transportation;to maximize safety and independence with self cares   Goal Progress progressing   Target Date 09/25/24   PT Goal 2   Goal  Identifier UE Edema / Volume   Goal Description Patient will demonstrate decreased volume of L  UE  by 5% and demonstrate stable volumes to decrease risk of infection.   Rationale to maximize safety and independence with performance of ADLs and functional tasks;to maximize safety and independence within the home;to maximize safety and independence within the community;to maximize safety and independence with transportation;to maximize safety and independence with self cares   Goal Progress 9/27/2024: 2285ml L>R = 16.6%   Target Date 12/23/24  (goal date extended as just initated intensive CLT)   Date Met 05/08/24   PT Goal 3   Goal Identifier LLIS   Goal Description Patient will demonstrate an 5 point decrease in LLIS to demonstrate a decreased impact of lymphedema on function.   Rationale to maximize safety and independence with self cares   Target Date 12/23/24   Goal Progress next session   Subjective Report   Subjective Report to dept 15' late without compression on L UE and reporting exacerbation due to fracture of R MCP 7/10/2024 and exacerbation of B wrist pain and inability to perform home lymphedema program including use of garments and self bandagin; feels standing balance improving; weaned off amlodipine with signficant imrpovient in LE edema.,   Objective Measure 1   Objective Measure UE edema   Details nonpitting puffy edema thorughotu L UE with minimal involvement of L hand; demonstrating rubbery edema along proximal ulna  and demonstrating visible increased edema of medial proximal ulna and distal humerus   Objective Measure 2   Objective Measure LE edema  / circumferences   Details 1+ distal pretibial pitting   Objective Measure 3   Objective Measure volume UE to 40cm   Details L = 2330ml; R = 2063ml; L>R = 13% but measured following use of sleeve x 5 days, last GCB 5 days ago.   Objective Measure 4   Objective Measure STS   Details 0; requires UE support   Objective Measure 5   Objective Measure SLS  balance   Details B = 0   Objective Measure 6   Objective Measure LLIS   Details next session   Therapeutic Procedure/Exercise   Therapeutic Procedures: strength, endurance, ROM, flexibility minutes (35241) 10   Ther Proc 1 strengthening, stretching, aerobic, falls prevention   Ther Proc 1 - Details educating pt in benefit of continue participation in Pilates class 1x/week assuring need for any modication to limit pain or balance concerns as well as progressing wtih stationary bike 2x/week x 30' each.  continued education in benefit of getting up during day every hour with 1 extended walk using 4ww for weight bearing and bone health and to strengthen moiblty.  assessed B shoulder ROM demonstrating continued increased tightness of L>R shoulder adn instructing to perform supine supported shoulder flexion stretches daily.  instructing pt to also continue with standing HR and seated TR without AFOs and elevated STS  x 5 x2sets   Skilled Intervention providing modification of HEP with recent injury   Patient Response/Progress verbalized understanding   Manual Therapy   Manual Therapy: Mobilization, MFR, MLD, friction massage minutes (37587) 35   Manual Therapy 1 - Details scheduled with fitter next week to consider new garments (custom sleeve, gauntlet and bra) stating has  not worn compression sleeve x 3 weeks and has been unable to locate sleeve octavia.  educatinng pt to hold on pursuing new comrpession sleeve if considering GCB to decrease volume but ok to consider mastectomy bra.  assessed volume with details in objective measrues noting B UE volume decreased likley d/t weight loss but noting increased volume L>R UE since last session eduar due to inability to perform home program.  educating pt in options including participation in intensive CLT to assist in decreasing volume and fibrosis of L UE and scheduling with fitter near end of treatment for new compression sleeve and gauntlet and educaitng in need for  imrpoved ability for self donning/doffing of daytime garments and possible benefit of consider easier to don night garment such as Tribute wrap or option to perform modified GCB over own nigth garment once loated.  further educating pt to look at trialing own pump to see if able to don/doff for imrpoved managment of lymphedema during periods of exacerbations.. opting to perfomring gCB L UE x 24 hrs if well tolerated then pt to trial donning and wearing prior custom sleeve prior to intensive CLT   Skilled Intervention assessment of exacerbation of L UE lymphedema and providing education in treatment options including modifiecation of home program POC   Patient Response/Progress pt arriving with exacerbation of L UE lymphedema and would benefit from skilled therapy to assist in reducing volume prior to transiioning to modified home prorgram.   Manual Therapy 1 volume, garemnts, GCB, MLD, compression pump   Education   Learner/Method Patient   Plan   Home program standing GS stretch, stanidng HR/ seated TR, STS x 5 x 2 sets, walking with 4ww/ SEC to mailbox most days; Nustep progressing to 150'/week; Pilates class 1-2x/week; UE / LE edema exs daily, positioning to decrease edema, GCB L UE x 24hrs 1x/week; wear compresression knee highs daily once receive custom socks; split stance hip hikes, theraband UE rows or isoetric B shoulder extension, conre(Pilates, bridging, SLR, CS), balance (split stance or mtandem stance)   Updates to plan of care opting to restablish goals due to exacerbation of L UE lymphedema.   Plan for next session assess current home program lymphedema tools to optimize abilty to utilze ; ask tolerance to GCB   Comments   Comments (x2) 4/2024; size large class 1 compression sleeve and gauntle and compression knee highs up to 20-30mhg; purchased AW cotton casual compression knee highs 20-30mmhg but does not feel as strong; did purchase custom compression knee highs and will be mailed to home.   Total  Session Time   Timed Code Treatment Minutes 45   Total Treatment Time (sum of timed and untimed services) 45         [facility]                                                                                   OUTPATIENT PHYSICAL THERAPY    PLAN OF TREATMENT FOR OUTPATIENT REHABILITATION   Patient's Last Name, First Name, Nadira Meyers YOB: 1950   Provider's Name   [facility]   Medical Record No.  [id number]     Onset Date: 11/02/23 (date of order Ashley Perry PA-C; Survivorship clinic)  Start of Care Date: 12/05/23     Medical Diagnosis:  lymphedema      PT Treatment Diagnosis:  lymphedema, fibrosis, pain, impaired balance, decreased strength Plan of Treatment  Frequency/Duration: 1 visit within 2 weeks then 4x/week x 2 weeks then 1x/week x 2 weeks then 1x/month/ 90 days    Certification date from 09/25/24 to 12/23/24         See note for plan of treatment details and functional goals     Jennifer Jauregui, PT                         I CERTIFY THE NEED FOR THESE SERVICES FURNISHED UNDER        THIS PLAN OF TREATMENT AND WHILE UNDER MY CARE     (Physician attestation of this document indicates review and certification of the therapy plan).              Referring Provider:  Ashley Perry    Initial Assessment  See Epic Evaluation- Start of Care Date: 12/05/23            PLAN  Reassessed status due to exacerbation of Lymphedema due to R MCP fracture and B wrist pain limiting tolerance and ability to utilize lymphedema home program garments and self GCB  updated POC    Beginning/End Dates of Progress Note Reporting Period:  06/28/24 to 09/27/2024    Referring Provider:  Ashley Prery

## 2024-09-27 NOTE — PATIENT INSTRUCTIONS
Plan:    Follow-up as needed      Your Care Team:  EP Cardiology   Telephone Number     Ciera Lawler RN [phone]    After business hours: [phone], ask for cardiologist on-call   Non-procedure scheduling:    Jazmine MUHAMMAD   [phone]   Procedure scheduling:    Supriya Mosley   [phone]   Device Clinic (Pacemakers, ICDs, Loop Recorders)    During business hours: [phone]  After business hours:   [phone]- select option 4 and ask for job code [id number].       Cardiovascular Clinic:   [address] [place] [address]. [place] [address]      As always, thank you for trusting us with your health care needs!

## 2024-09-27 NOTE — LETTER
9/27/2024      RE: Nadira Perez  [address]  [place] [facility] [address]       Dear Colleague,    Thank you for the opportunity to participate in the care of your patient, Nadira Perez, at the [facility] [place] at [facility]. Please see a copy of my visit note below.        ELECTROPHYSIOLOGY CLINIC VISIT    Assessment/Recommendations   Assessment/Plan:    Ms. Perez is a 74 year old female with a PMH most remarkable for essential HTN and breast cancer (status post surgery, chemo, and radiation; now in remission). She also has frequent PVCs. She has been following with Dr. Rolle who referred her for evaluation of PVCs.    Premature Ventricular Contractions:   We had a long discussion with patient regarding PVCs. We discussed various options for treatment of PVCs. In a structurally normal heart, PVCs are considered relatively benign. Treatment is therefore indicated primarily for relief of symptoms or if associated with a cardiomyopathy. Medications such as calcium channel blockers or beta blockers in some cases reduce the frequency and duration of the episodes. The other option discussed was an electrophysiology study (EPS) and possible ablation. The procedural risk of EPS and PVC ablation were discussed in detail. Risks discussed include: vascular complications, CVA, AVB, pericardial effusion and tamponade. We also discussed that if PVC burden is somewhat variable, it is possible that there would be sufficient amount of PVCs during procedure to allow for adequate mapping. We also discussed that at times the PVCs will be traced to an origin in the heart where it is not safe to proceed with ablation. Overall, we agreed that given she has preserved LVEF and very minimal symptoms, we would continue with conservative measures. If she develops any exertional symptoms, low threshold for ischemic work up.     She will continue to follow with   Tarah.   Follow up with EP on an as needed basis.          History of Present Illness/Subjective    Ms. Nadira Perez is a 74 year old female who comes in today for EP consultation of PVCs.    Ms. Perez is a 74 year old female with a PMH most remarkable for essential HTN and breast cancer (status post surgery, chemo, and radiation; now in remission). She also has frequent PVCs. She has been following with Dr. Rolle who referred her for evaluation of PVCs.     EP Visit 3/13/24: Patient tells me that she recently was admitted to [facility] with a fall and sternal fracture. Troponin was measured which led to an echocardiogram. Her echocardiogram showed a decreased LVEF. She was optimized on GDMT and discharged. She then followed up with Dr. Rolle and her LVEF was improving. It was Dr. Rolle's impression that this was stress induced. She mentions she had PVCs for 15-20 years, not very symptomatic. The PVCs are mostly monomorphic but has 2 morphologies. She never had an MRI yet. EKG Nov 9th 2023 PVCs look AMC origin, sometimes more negative in V1, liely due to lead placement variation    She reports feeling well. She has some mild palpitation with PVCs. She denies any exertional limitations or symptoms. She notes she had positive sleep study and was recently started on CPAP. She denies chest discomfort, palpitations, abdominal fullness/bloating or peripheral edema, shortness of breath, paroxysmal nocturnal dyspnea, orthopnea, lightheadedness, dizziness, pre-syncope, or syncope. A CMR from 4/2024 showed LVEF 50%, LGE in mid inferolateral wall and subendocaridal. Presenting 12 lead ECG shows NSR Vent Rate 73 bpm,  ms,  ms, QTc 464 ms.Current cardiac medications include: Spironolactone, Lisinopril, Hydrochlorothiazide, Coreg,and Lipitor.           I have reviewed and updated the patient's Past Medical History, Social History, Family History and Medication List.     Cardiographics (Personally Reviewed) :    3/6/2024 Echo  Left ventricular function is decreased. The ejection fraction is 50-55% (borderline).  The left ventricular function has improved.    Echo from 2/16/2024  The left ventricle is mildly dilated.  The visual ejection fraction is estimated at 35%.  The right ventricle is normal in structure, function and size.  Aortic root dilatation is present.  TDS- fractured sternum. Fat pad vs. effusion(small)  Clinical correlation required The study was technically difficult. Compared to prior study, changes are noted.    4/11/24 CMR:  1. The LV is normal in cavity size. The global systolic function is mildly reduced. The LVEF is 50 %. Mid  inferolateral wall thinned out with severe hypokinesis     2. The RV is normal in cavity size. The global systolic function is borderline reduced. The RVEF is 55 %.      3. Late gadolinium enhancement imaging shows mid inferolateral wall subendocardial delayed enhancement that  could represent a small size infarct.     4. There is no pericardial effusion.     5. There is no intracardiac thrombus.     6. No evidence of myocardial iron overload.     7. Multiple renal cysts noted.        CONCLUSIONS:   1. Mildly reduced LV systolic function with LVEF of 50%.  2. Late gadolinium enhancement imaging shows mid inferolateral wall subendocardial delayed enhancement that  could represent a small size infarct.    Ziopatch from 11/14/23 - 11/21/23        ECG from 12/2023 with PVC         Physical Examination   /73 (BP Location: Right arm, Patient Position: Supine, Cuff Size: Adult Regular)   Pulse 75   LMP  (LMP Unknown)   SpO2 98%   Wt Readings from Last 3 Encounters:   09/10/24 79.7 kg (175 lb 11.2 oz)   09/06/24 79.7 kg (175 lb 11.2 oz)   08/16/24 81.5 kg (179 lb 9.6 oz)     General Appearance:   Alert, well-appearing and in no acute distress.   HEENT: Atraumatic, normocephalic. PERRL.  MMM.   Chest/Lungs:   Respirations unlabored.  Lungs are clear to auscultation.    Cardiovascular:   Regular rate and rhythm.  S1/S2. No murmur.    Abdomen:  Soft, nontender, nondistended.   Extremities: No cyanosis or clubbing. No edema.    Musculoskeletal: Moves all extremities.  Gait normal.   Skin: Warm, dry, intact.    Neurologic: Mood and affect are appropriate.  Alert and oriented to person, place, time, and situation.          Medications  Allergies   Current Outpatient Medications   Medication Sig Dispense Refill     acetaminophen (TYLENOL) 500 MG tablet Take 1,000 mg by mouth 3 times daily       artificial tears OINT ophthalmic ointment at bedtime       Ascorbic Acid (VITAMIN C) 500 MG CHEW Take 1 tablet by mouth every morning       atorvastatin (LIPITOR) 80 MG tablet Take 1 tablet (80 mg) by mouth daily 90 tablet 3     carvedilol (COREG) 12.5 MG tablet Take 1 tablet (12.5 mg) by mouth 2 times daily (with meals) 180 tablet 3     CRANBERRY EXTRACT PO Take 500 mg by mouth every morning       diclofenac (VOLTAREN) 1 % topical gel Apply 2 g topically 4 times daily 100 g 6     gabapentin (NEURONTIN) 300 MG capsule Take 4 capsules (1,200 mg) by mouth 3 times daily 1080 capsule 3     HEMP OIL OR EXTRACT OR OTHER CBD CANNABINOID, NOT MEDICAL CANNABIS, THC       hydrochlorothiazide (HYDRODIURIL) 25 MG tablet Take 1 tablet (25 mg) by mouth daily 90 day supply denied 30 tablet 2     levothyroxine (SYNTHROID/LEVOTHROID) 112 MCG tablet TAKE 1/2 TAB BY MOUTH EVERY DAY 45 tablet 3     lisinopril (ZESTRIL) 20 MG tablet Take 1 tablet (20 mg) by mouth 2 times daily. 180 tablet 0     MAGNESIUM GLYCINATE PO Take 480 mg by mouth daily       Melatonin 10 MG TABS tablet Take 10 mg by mouth at bedtime       Multiple Vitamin (MULTI-VITAMIN) per tablet Take 1 tablet by mouth every morning       ondansetron (ZOFRAN) 4 MG tablet Take 1 tablet (4 mg) by mouth every 6 hours as needed for nausea 12 tablet 0     ondansetron (ZOFRAN-ODT) 4 MG ODT tab Take 1 tablet (4 mg) by mouth every 12 hours as needed for nausea  180 tablet 3     spironolactone (ALDACTONE) 50 MG tablet Take 1 tablet (50 mg) by mouth daily at 2 pm. 90 tablet 2     tiZANidine (ZANAFLEX) 4 MG tablet Take 1 tablet (4 mg) by mouth at bedtime 90 tablet 1     Turmeric 500 MG CAPS Take 500 mg by mouth every morning      Allergies   Allergen Reactions     Erythromycin Nausea     Penicillins Hives     Around age 6 - doesn't recall full reaction, mother told her it was hives.     Has tolerated cephalsporins.          Lab Results (Personally Reviewed)    Chemistry/lipid CBC Cardiac Enzymes/BNP/TSH/INR   Lab Results   Component Value Date    BUN 24.1 (H) 05/03/2024     05/03/2024    CO2 26 05/03/2024     Creatinine   Date Value Ref Range Status   07/23/2024 0.75 0.51 - 0.95 mg/dL Final   05/07/2021 0.73 0.52 - 1.04 mg/dL Final   05/07/2021 0.74 0.52 - 1.04 mg/dL Final       Lab Results   Component Value Date    CHOL 214 (H) 03/07/2024    HDL 56 03/07/2024     (H) 03/07/2024      Lab Results   Component Value Date    WBC 10.3 02/17/2024    HGB 11.9 02/17/2024    HCT 37.3 02/17/2024    MCV 91 02/17/2024     02/17/2024    Lab Results   Component Value Date    TSH 2.03 12/04/2023    INR 0.85 (L) 08/20/2010        The patient states understanding and is agreeable with the plan.   Kvng Lyon MD [facility]  Cardiology - Electrophysiology      Total time spent on patient visit, reviewing notes, imaging, labs, orders, and completing necessary documentation: 30 minutes.                    Please do not hesitate to contact me if you have any questions/concerns.     Sincerely,     Kvng Lyon MD

## 2024-09-27 NOTE — NURSING NOTE
Chief Complaint   Patient presents with    Follow Up     9/27/2024 visit with Kvng Lyon MD for RETURN EP - per pt no cardiac device records in epic dx: follow up       Vitals were taken, medications reconciled, and EKG was performed.    Stewart Goodrich, EMT  2:56 PM

## 2024-09-30 ENCOUNTER — THERAPY VISIT (OUTPATIENT)
Dept: PHYSICAL THERAPY | Facility: CLINIC | Age: 74
End: 2024-09-30
Payer: COMMERCIAL

## 2024-09-30 DIAGNOSIS — R29.898 RIGHT LEG WEAKNESS: ICD-10-CM

## 2024-09-30 DIAGNOSIS — R26.89 IMPAIRMENT OF BALANCE: Primary | ICD-10-CM

## 2024-09-30 DIAGNOSIS — M21.371 RIGHT FOOT DROP: ICD-10-CM

## 2024-09-30 LAB
ATRIAL RATE - MUSE: 73 BPM
DIASTOLIC BLOOD PRESSURE - MUSE: NORMAL MMHG
INTERPRETATION ECG - MUSE: NORMAL
P AXIS - MUSE: 56 DEGREES
PR INTERVAL - MUSE: 170 MS
QRS DURATION - MUSE: 114 MS
QT - MUSE: 422 MS
QTC - MUSE: 464 MS
R AXIS - MUSE: 231 DEGREES
SYSTOLIC BLOOD PRESSURE - MUSE: NORMAL MMHG
T AXIS - MUSE: 41 DEGREES
VENTRICULAR RATE- MUSE: 73 BPM

## 2024-09-30 PROCEDURE — 97110 THERAPEUTIC EXERCISES: CPT | Mod: GP | Performed by: PHYSICAL THERAPIST

## 2024-09-30 PROCEDURE — 97161 PT EVAL LOW COMPLEX 20 MIN: CPT | Mod: GP | Performed by: PHYSICAL THERAPIST

## 2024-10-01 NOTE — PROGRESS NOTES
3 day Sleep therapy management telephone visit    Diagnostic AHI:       Confirmed with patient at time of call- N/A Patient is still interested in STM service       Message left for patient to return call.        Objective data     Order Settings for PAP  CPAP min     CPAP max              Device settings from machine CPAP min 5     CPAP max 15                      Assessment: No usage but has account in [email or web address]      Patient has the following upcoming sleep appts:  Future Sleep Appointments         Provider Department    11/5/2024 12:00 PM (Arrive by 11:45 AM) Nathan Marquez MD [facility] [place]            Replacement device: No  STM ordered by provider: Yes     Total time spent on accessing and  interpreting remote patient PAP therapy data  10 minutes    Total time spent counseling, coaching  and reviewing PAP therapy data with patient  1 minutes    [address] no

## 2024-10-02 ENCOUNTER — ANCILLARY PROCEDURE (OUTPATIENT)
Dept: RADIOLOGY | Facility: AMBULATORY SURGERY CENTER | Age: 74
End: 2024-10-02
Attending: PHYSICAL MEDICINE & REHABILITATION
Payer: COMMERCIAL

## 2024-10-02 ENCOUNTER — HOSPITAL ENCOUNTER (OUTPATIENT)
Facility: AMBULATORY SURGERY CENTER | Age: 74
Discharge: HOME | End: 2024-10-02
Attending: PHYSICAL MEDICINE & REHABILITATION | Admitting: PHYSICAL MEDICINE & REHABILITATION
Payer: COMMERCIAL

## 2024-10-02 VITALS
HEIGHT: 65 IN | OXYGEN SATURATION: 98 % | BODY MASS INDEX: 29.02 KG/M2 | HEART RATE: 76 BPM | DIASTOLIC BLOOD PRESSURE: 72 MMHG | RESPIRATION RATE: 16 BRPM | WEIGHT: 174.2 LBS | SYSTOLIC BLOOD PRESSURE: 104 MMHG

## 2024-10-02 DIAGNOSIS — R52 PAIN: ICD-10-CM

## 2024-10-02 PROCEDURE — G0260 INJ FOR SACROILIAC JT ANESTH: HCPCS

## 2024-10-02 RX ORDER — TRIAMCINOLONE ACETONIDE 40 MG/ML
INJECTION, SUSPENSION INTRA-ARTICULAR; INTRAMUSCULAR DAILY PRN
Status: DISCONTINUED | OUTPATIENT
Start: 2024-10-02 | End: 2024-10-02 | Stop reason: HOSPADM

## 2024-10-02 RX ORDER — IOPAMIDOL 408 MG/ML
INJECTION, SOLUTION INTRATHECAL DAILY PRN
Status: DISCONTINUED | OUTPATIENT
Start: 2024-10-02 | End: 2024-10-02 | Stop reason: HOSPADM

## 2024-10-02 RX ORDER — LIDOCAINE HYDROCHLORIDE 10 MG/ML
INJECTION, SOLUTION EPIDURAL; INFILTRATION; INTRACAUDAL; PERINEURAL DAILY PRN
Status: DISCONTINUED | OUTPATIENT
Start: 2024-10-02 | End: 2024-10-02 | Stop reason: HOSPADM

## 2024-10-02 NOTE — DISCHARGE INSTRUCTIONS
Home Care Instructions after a Sacroiliac Joint Injection        Activity  -You may resume most normal activity levels with the exception of strenuous activity. It is important for us to know if your pain with normal activity is relieved after this injection.  -DO NOT shower for 24 hours  -DO NOT remove bandaid for 24 hours    Pain  -You may experience soreness at the injection site for one or two days  -You may use an ice pack for 20 minutes every 2 hours for the first 24 hours  -You may use a heating pad after the first 24 hours  -You may use Tylenol (acetaminophen) every 4 hours or other pain medicines as directed by your physician    You may experience numbness radiating into your legs or arms (depending on the procedure location). This numbness may last several hours. Until sensation returns to normal; please use caution in walking, climbing stairs, and stepping out of your vehicle, etc.    DID YOU RECEIVE STEROIDS TODAY?  Yes    Common side effects of steroids:  Not everyone will experience corticosteroid side effects. If side effects are experienced, they will gradually subside in the 7-10 day period following an injection. Most common side effects include:  -Flushed face and/or chest  -Feeling of warmth, particularly in the face but could be an overall feeling of warmth  -Increased blood sugar in diabetic patients  -Menstrual irregularities my occur. If taking hormone-based birth control an alternate method of birth control is recommended  -Sleep disturbances and/or mood swings are possible  -Leg cramps      PLEASE KEEP TRACK OF YOUR SYMPTOMS AND NOTE YOUR IMPROVEMENT FOR YOUR DOCTOR.     Please contact us if you have:  -Severe pain  -Fever more than 101.5 degrees Fahrenheit  -Signs of infection at the injection site (redness, swelling, or drainage)    FOR PM&R PATIENTS:  For patients seen by the PM and R service, please call [phone]. (Monday through Friday 8a-5p.  After business hours and weekends call  [phone] and ask for the PM and R doctor on call). If you need to fax a pain diary to PM&R the fax number is [phone]. If you are unable to fax, uploading to [employer] is encouraged, then send to provider. If you have procedure scheduling questions please call [phone] Option #2.

## 2024-10-02 NOTE — OP NOTE
PROCEDURE NOTE: Sacroiliac Joint Injection    PROCEDURE DATE: 10/2/2024    PATIENT NAME: Nadira Perez  YOB: 1950    ATTENDING PHYSICIAN: Thais Saldivar MD   RESIDENT/FELLOW: None    PREOPERATIVE DIAGNOSIS: Bilateral Sacroiliac Joint Pain  POSTOPERATIVE DIAGNOSIS: Same    PROCEDURE PERFORMED: Bilateral Sacroiliac joint injection with fluoroscopic guidance      FLUOROSCOPY WAS USED.     INDICATIONS FOR PROCEDURE:   Nadira Perez is a 74 year old female with a clinical picture consistent with Bilateral sacroiliac joint pain with symptoms much more pronounced on the left compared to the right.    PROCEDURE AND FINDINGS:    She was greeted in the pre-procedure holding area. The risk, benefits and alternatives to the procedure were again reviewed with the patient and written informed consent was placed in the chart. An IV line was not placed.  A 500 mL bag of NS was not connected to the patient. She was taken to the procedure room and positioned prone on the fluoroscopy table.  Routine monitors were applied including EKG leads (if sedation was used), blood pressure cuff, and pulse oximetry. Prior to the procedure a time out was completed, verifying correct patient, procedure, site, positioning, and implants and/or special equipment.     The skin was prepped with chlorhexidine and draped in the usual sterile fashion.  A  film was taken to identify the Bilateral sacroiliac joint and the inferior most intersection of the anterior and posterior sacroiliac joint lines. The overlying skin and subcutaneous tissues were anesthetized using a 27-gauge 1-1/4 inch needle with 1% preservative-free lidocaine for a total volume of 1.5 mls. A 22-gauge 5-inch Quincke spinal needle was advanced into the Bilateral sacroiliac joint space under intermittent fluoroscopic guidance. Needle position was confirmed on both AP and lateral views.     Then, after negative aspiration, 0.5mL Isovue-M contrast was injected  and confirmed adequate intraarticular spread. There was no evidence of intravascular uptake. At this point, after negative aspiration, a total of 2.5mL volume of treatment injectate, consisting of 0.5mL Kenalog (40mg/mL) and 2.0mL of 1% Lidocaine, was injected easily on the right; and 2.5mL consisting of 1.5mL Kenalog (40mg/mL) and 1.0mL of 1% Lidocaine, was injected easily on the left  The needle was then flushed with 0.5 ml of local anesthetic and removed. The needle insertion site was dressed appropriately.     Nadira Perez was taken to the recovery room where she was monitored for a brief period of time. She tolerated the procedure well and was discharged home in stable condition with post procedural instructions.     Before the procedure, she reported a pain score of 5/10.   After the procedure, she reported a pain score of 3/10.     Follow-up will be with referring cancer rehabilitation clinic: Dr. Centeno    COMPLICATIONS: None    COMMENTS: None

## 2024-10-04 ENCOUNTER — DOCUMENTATION ONLY (OUTPATIENT)
Dept: INTERNAL MEDICINE | Facility: CLINIC | Age: 74
End: 2024-10-04

## 2024-10-04 NOTE — PROGRESS NOTES
Type of Form Received: [facility] Rx Request    Form Received (Date) 10/4/24   Form Filled out Yes, faxed 10/14/24   Placed in provider folder Yes

## 2024-10-07 ENCOUNTER — THERAPY VISIT (OUTPATIENT)
Dept: PHYSICAL THERAPY | Facility: CLINIC | Age: 74
End: 2024-10-07
Payer: COMMERCIAL

## 2024-10-07 ENCOUNTER — THERAPY VISIT (OUTPATIENT)
Dept: OCCUPATIONAL THERAPY | Facility: CLINIC | Age: 74
End: 2024-10-07
Payer: COMMERCIAL

## 2024-10-07 DIAGNOSIS — R29.898 RIGHT LEG WEAKNESS: Primary | ICD-10-CM

## 2024-10-07 DIAGNOSIS — M21.371 RIGHT FOOT DROP: ICD-10-CM

## 2024-10-07 DIAGNOSIS — S62.336D CLOSED DISPLACED FRACTURE OF NECK OF FIFTH METACARPAL BONE OF RIGHT HAND WITH ROUTINE HEALING, SUBSEQUENT ENCOUNTER: ICD-10-CM

## 2024-10-07 DIAGNOSIS — M25.641 STIFFNESS OF FINGER JOINT OF RIGHT HAND: Primary | ICD-10-CM

## 2024-10-07 PROCEDURE — 97140 MANUAL THERAPY 1/> REGIONS: CPT | Mod: GO | Performed by: OCCUPATIONAL THERAPIST

## 2024-10-07 PROCEDURE — 97110 THERAPEUTIC EXERCISES: CPT | Mod: GP | Performed by: PHYSICAL THERAPIST

## 2024-10-07 PROCEDURE — 97110 THERAPEUTIC EXERCISES: CPT | Mod: GO | Performed by: OCCUPATIONAL THERAPIST

## 2024-10-07 PROCEDURE — 97750 PHYSICAL PERFORMANCE TEST: CPT | Mod: GP | Performed by: PHYSICAL THERAPIST

## 2024-10-08 ENCOUNTER — MEDICAL CORRESPONDENCE (OUTPATIENT)
Dept: HEALTH INFORMATION MANAGEMENT | Facility: CLINIC | Age: 74
End: 2024-10-08
Payer: COMMERCIAL

## 2024-10-09 ENCOUNTER — DOCUMENTATION ONLY (OUTPATIENT)
Dept: SLEEP MEDICINE | Facility: CLINIC | Age: 74
End: 2024-10-09
Payer: COMMERCIAL

## 2024-10-09 NOTE — PROGRESS NOTES
14 day Sleep therapy management telephone visit    Diagnostic AHI:    HST: 21.4        LEFT VOICE MESSAGE FOR PATIENT TO RETURN CALL      Objective measures: 14 day rolling measures   COMPLIANCE LEAK AHI AVERAGE USE IN MINUTES   0 % 0 0 0   GOAL >70% GOAL < 24 LPM GOAL <5 GOAL >240          Device settings:  CPAP MIN CPAP MAX EPR RESMED SOFT RESPONSE SETTING   5 cm  H20 15 cm  H20 2 OFF         Patient has the following upcoming sleep appts:  Future Sleep Appointments         Provider Department    11/5/2024 12:00 PM (Arrive by 11:45 AM) Nathan Marquez MD [facility] [place]            Replacement device: No  STM ordered by provider: Yes     Total time spent on accessing and  interpreting remote patient PAP therapy data  10 minutes    Total time spent counseling, coaching  and reviewing PAP therapy data with patient  0 minutes

## 2024-10-11 ENCOUNTER — OFFICE VISIT (OUTPATIENT)
Dept: PHARMACY | Facility: CLINIC | Age: 74
End: 2024-10-11
Payer: COMMERCIAL

## 2024-10-11 ENCOUNTER — MYC MEDICAL ADVICE (OUTPATIENT)
Dept: ORTHOPEDICS | Facility: CLINIC | Age: 74
End: 2024-10-11

## 2024-10-11 VITALS — SYSTOLIC BLOOD PRESSURE: 131 MMHG | DIASTOLIC BLOOD PRESSURE: 81 MMHG | HEART RATE: 62 BPM | OXYGEN SATURATION: 97 %

## 2024-10-11 DIAGNOSIS — I10 ESSENTIAL HYPERTENSION, BENIGN: Primary | ICD-10-CM

## 2024-10-11 PROCEDURE — 99607 MTMS BY PHARM ADDL 15 MIN: CPT

## 2024-10-11 PROCEDURE — 99606 MTMS BY PHARM EST 15 MIN: CPT

## 2024-10-11 NOTE — PATIENT INSTRUCTIONS
"Recommendations from today's MTM visit:                                                      I will let Dr. Rascon know about recent BP and HR readings and will let you if she decides you need a medication adjustment.  I will get you a replacement pillbox     Follow-up: via [employer] in 2 weeks  It was great speaking with you today.  I value your experience and would be very thankful for your time in providing feedback in our clinic survey. In the next few days, you may receive an email or text message from [employer] with a link to a survey related to your  clinical pharmacist.\"     To schedule another MTM appointment, please call the clinic directly or you may call the MTM scheduling line at [phone] or toll-free at [phone].     My Clinical Pharmacist's contact information:                                                      Please feel free to contact me with any questions or concerns you have.      Joann Culp, PharmD  Medication Therapy Management Pharmacist    "

## 2024-10-11 NOTE — PROGRESS NOTES
Medication Therapy Management (MTM) Encounter    ASSESSMENT:                            Medication Adherence/Access: No issues identified.    Hypertension  A majority of clinic BP readings are at goal of <140/90 mmHg, however, close to half of home BP readings are above goal. Since patient has verified the accuracy of her home readings, a medication adjustment may be needed. Options include switching hydrochlorothiazide to chlorthalidone, increasing dose of carvedilol and monitoring HR, or starting clonidine. Will defer to cardiologist.    PLAN:                            I will let Dr. Rascon know about recent BP readings and see if she wants to make an adjustment    Follow-up: in clinic in 3 months    SUBJECTIVE/OBJECTIVE:                          Ismael Perez is a 74 year old female seen for a follow-up visit. Patient was previously following with MTM pharmacist, Willis So.    Reason for visit: Blood pressure follow up.    Allergies/ADRs: Reviewed in chart  Past Medical History: Reviewed in chart  Tobacco: She reports that she has never smoked. She has never been exposed to tobacco smoke. She has never used smokeless tobacco.  Alcohol: none  Other Substance Use: THC flower at bedtime for pain  Medication Adherence/Access: no issues reported.    Hypertension   Lisinopril 20 mg twice daily  Carvedilol 12.5 mg twice daily  Spironolactone 50 mg daily  Hydrochlorothiazide 25 mg daily   Patient is not experiencing side effects.  See home blood pressure readings below.  Patient notes she has verified the accuracy of her home monitor by comparing it to readings in clinic.    Date BP1 BP2 HR   10/01 133/91 - 68   10/02 132/90 134/89 71   10/03 130/83 122/80 72   10/04 154/93 - 66   10/05 140/94 139/92 72   10/06 137/93 139/92 77   10/07 157/97 - 69   10/08 147/95 110/76 64   10/09 111/78 118/77 78   10/10 133/91 - 64   10/11 146/93 140/85 70     BP Readings from Last 6 Encounters:   10/11/24 131/81   10/02/24 104/72    09/27/24 118/73   09/16/24 (!) 157/104   09/10/24 125/85     Pulse Readings from Last 3 Encounters:   10/11/24 62   10/02/24 76      Today's Vitals: /81   Pulse 62   LMP  (LMP Unknown)   SpO2 97%   ----------------    I spent 60 minutes with this patient today. A copy of the visit note was provided to the patient's provider(s). A summary of these recommendations was sent via Deni Culp PharmD  Medication Therapy Management Pharmacist     Medication Therapy Recommendations  No medication therapy recommendations to display

## 2024-10-11 NOTE — Clinical Note
Hi Dr. Rascon,  I met with Ismael for a follow up and clinic BP readings are <140/90, however, around half of her home BP readings are >140/90 (see below). She states she's verified the accuracy of her home monitor by comparing it to clinic monitor. I'm not totally convinced she needs a med adjustment, but wanted to get your thoughts?  Date BP1  BP2  HR 10/01 133/91  -  68 10/02 132/90  134/89  71 10/03 130/83  122/80  72 10/04 154/93  -  66 10/05 140/94  139/92  72 10/06 137/93  139/92  77 10/07 157/97  -  69 10/08 147/95  110/76  64 10/09 111/78  118/77  78 10/10 133/91  -  64 10/11 146/93  140/85  70  Thanks, Joann Culp, PharmD Medication Therapy Management Pharmacist

## 2024-10-12 ENCOUNTER — TRANSFERRED RECORDS (OUTPATIENT)
Dept: HEALTH INFORMATION MANAGEMENT | Facility: CLINIC | Age: 74
End: 2024-10-12

## 2024-10-12 ENCOUNTER — OFFICE VISIT (OUTPATIENT)
Dept: INTERNAL MEDICINE | Facility: CLINIC | Age: 74
End: 2024-10-12
Payer: COMMERCIAL

## 2024-10-12 ENCOUNTER — LAB (OUTPATIENT)
Dept: LAB | Facility: CLINIC | Age: 74
End: 2024-10-12
Payer: COMMERCIAL

## 2024-10-12 VITALS
HEIGHT: 65 IN | SYSTOLIC BLOOD PRESSURE: 123 MMHG | HEART RATE: 68 BPM | OXYGEN SATURATION: 98 % | BODY MASS INDEX: 28.99 KG/M2 | DIASTOLIC BLOOD PRESSURE: 73 MMHG

## 2024-10-12 DIAGNOSIS — E78.00 PURE HYPERCHOLESTEROLEMIA: ICD-10-CM

## 2024-10-12 DIAGNOSIS — K44.9 HIATAL HERNIA: Primary | ICD-10-CM

## 2024-10-12 DIAGNOSIS — M79.2 NEUROPATHIC PAIN: ICD-10-CM

## 2024-10-12 DIAGNOSIS — E03.9 HYPOTHYROIDISM, UNSPECIFIED TYPE: ICD-10-CM

## 2024-10-12 DIAGNOSIS — M81.0 OSTEOPOROSIS, UNSPECIFIED OSTEOPOROSIS TYPE, UNSPECIFIED PATHOLOGICAL FRACTURE PRESENCE: ICD-10-CM

## 2024-10-12 DIAGNOSIS — K21.9 GASTRIC REFLUX: ICD-10-CM

## 2024-10-12 DIAGNOSIS — Z85.3 HX: BREAST CANCER: ICD-10-CM

## 2024-10-12 DIAGNOSIS — E11.9 TYPE 2 DIABETES MELLITUS WITHOUT COMPLICATION, WITHOUT LONG-TERM CURRENT USE OF INSULIN (H): ICD-10-CM

## 2024-10-12 DIAGNOSIS — S93.512A SPRAIN OF INTERPHALANGEAL JOINT OF LEFT GREAT TOE, INITIAL ENCOUNTER: ICD-10-CM

## 2024-10-12 DIAGNOSIS — I10 BENIGN ESSENTIAL HYPERTENSION: ICD-10-CM

## 2024-10-12 DIAGNOSIS — I10 ESSENTIAL HYPERTENSION, BENIGN: ICD-10-CM

## 2024-10-12 DIAGNOSIS — Q17.9 CONGENITAL ABNORMALITY OF EAR: ICD-10-CM

## 2024-10-12 LAB
ALBUMIN SERPL BCG-MCNC: 4.3 G/DL (ref 3.5–5.2)
ALP SERPL-CCNC: 64 U/L (ref 40–150)
ALT SERPL W P-5'-P-CCNC: 21 U/L (ref 0–50)
ANION GAP SERPL CALCULATED.3IONS-SCNC: 9 MMOL/L (ref 7–15)
AST SERPL W P-5'-P-CCNC: 22 U/L (ref 0–45)
BILIRUB SERPL-MCNC: 0.3 MG/DL
BUN SERPL-MCNC: 36.7 MG/DL (ref 8–23)
CALCIUM SERPL-MCNC: 8.4 MG/DL (ref 8.8–10.4)
CHLORIDE SERPL-SCNC: 106 MMOL/L (ref 98–107)
CREAT SERPL-MCNC: 0.9 MG/DL (ref 0.51–0.95)
EGFRCR SERPLBLD CKD-EPI 2021: 68 ML/MIN/1.73M2
EST. AVERAGE GLUCOSE BLD GHB EST-MCNC: 137 MG/DL
GLUCOSE SERPL-MCNC: 108 MG/DL (ref 70–99)
HBA1C MFR BLD: 6.4 %
HCO3 SERPL-SCNC: 23 MMOL/L (ref 22–29)
POTASSIUM SERPL-SCNC: 4.7 MMOL/L (ref 3.4–5.3)
PROT SERPL-MCNC: 6.5 G/DL (ref 6.4–8.3)
SODIUM SERPL-SCNC: 138 MMOL/L (ref 135–145)
TSH SERPL DL<=0.005 MIU/L-ACNC: 2.57 UIU/ML (ref 0.3–4.2)
VIT D+METAB SERPL-MCNC: 62 NG/ML (ref 20–50)

## 2024-10-12 PROCEDURE — 99417 PROLNG OP E/M EACH 15 MIN: CPT | Performed by: NURSE PRACTITIONER

## 2024-10-12 PROCEDURE — 80053 COMPREHEN METABOLIC PANEL: CPT | Performed by: PATHOLOGY

## 2024-10-12 PROCEDURE — 83036 HEMOGLOBIN GLYCOSYLATED A1C: CPT | Performed by: NURSE PRACTITIONER

## 2024-10-12 PROCEDURE — 84443 ASSAY THYROID STIM HORMONE: CPT | Performed by: PATHOLOGY

## 2024-10-12 PROCEDURE — 99215 OFFICE O/P EST HI 40 MIN: CPT | Performed by: NURSE PRACTITIONER

## 2024-10-12 PROCEDURE — 99000 SPECIMEN HANDLING OFFICE-LAB: CPT | Performed by: PATHOLOGY

## 2024-10-12 PROCEDURE — G2211 COMPLEX E/M VISIT ADD ON: HCPCS | Performed by: NURSE PRACTITIONER

## 2024-10-12 PROCEDURE — 36415 COLL VENOUS BLD VENIPUNCTURE: CPT | Performed by: PATHOLOGY

## 2024-10-12 PROCEDURE — 82306 VITAMIN D 25 HYDROXY: CPT | Performed by: NURSE PRACTITIONER

## 2024-10-12 RX ORDER — GABAPENTIN 300 MG/1
1200 CAPSULE ORAL 3 TIMES DAILY
Qty: 1080 CAPSULE | Refills: 3 | Status: SHIPPED | OUTPATIENT
Start: 2024-10-12

## 2024-10-12 RX ORDER — CARVEDILOL 12.5 MG/1
12.5 TABLET ORAL 2 TIMES DAILY WITH MEALS
Qty: 180 TABLET | Refills: 3 | Status: SHIPPED | OUTPATIENT
Start: 2024-10-12

## 2024-10-12 RX ORDER — HYDROCHLOROTHIAZIDE 25 MG/1
25 TABLET ORAL DAILY
Qty: 30 TABLET | Refills: 3 | Status: SHIPPED | OUTPATIENT
Start: 2024-10-12 | End: 2024-11-05

## 2024-10-12 RX ORDER — LISINOPRIL 20 MG/1
20 TABLET ORAL 2 TIMES DAILY
Qty: 180 TABLET | Refills: 3 | Status: SHIPPED | OUTPATIENT
Start: 2024-10-12

## 2024-10-12 RX ORDER — ONDANSETRON 4 MG/1
4 TABLET, FILM COATED ORAL EVERY 6 HOURS PRN
Qty: 12 TABLET | Refills: 3 | Status: SHIPPED | OUTPATIENT
Start: 2024-10-12

## 2024-10-12 RX ORDER — ATORVASTATIN CALCIUM 80 MG/1
80 TABLET, FILM COATED ORAL DAILY
Qty: 90 TABLET | Refills: 3 | Status: SHIPPED | OUTPATIENT
Start: 2024-10-12

## 2024-10-12 RX ORDER — LEVOTHYROXINE SODIUM 112 UG/1
TABLET ORAL
Qty: 45 TABLET | Refills: 3 | Status: SHIPPED | OUTPATIENT
Start: 2024-10-12

## 2024-10-12 RX ORDER — SPIRONOLACTONE 50 MG/1
50 TABLET, FILM COATED ORAL
Qty: 90 TABLET | Refills: 3 | Status: SHIPPED | OUTPATIENT
Start: 2024-10-12

## 2024-10-12 ASSESSMENT — PAIN SCALES - GENERAL: PAINLEVEL: MILD PAIN (2)

## 2024-10-12 NOTE — PROGRESS NOTES
Preventive Care Visit  [facility]  MERCEDES Kyle CNP, Internal Medicine  Oct 12, 2024      Assessment & Plan     Hiatal hernia  - Adult Gen Surg  Referral; Future    Pure hypercholesterolemia  - atorvastatin (LIPITOR) 80 MG tablet; Take 1 tablet (80 mg) by mouth daily.    Neuropathic pain  - gabapentin (NEURONTIN) 300 MG capsule; Take 4 capsules (1,200 mg) by mouth 3 times daily.    Benign essential hypertension  - hydrochlorothiazide (HYDRODIURIL) 25 MG tablet; Take 1 tablet (25 mg) by mouth daily. 90 day supply denied  - lisinopril (ZESTRIL) 20 MG tablet; Take 1 tablet (20 mg) by mouth 2 times daily.  - spironolactone (ALDACTONE) 50 MG tablet; Take 1 tablet (50 mg) by mouth daily at 2 pm.  -- carvedilol (COREG) 12.5 MG tablet; Take 1 tablet (12.5 mg) by mouth 2 times daily (with meals).    Hypothyroidism, unspecified type  - levothyroxine (SYNTHROID/LEVOTHROID) 112 MCG tablet; TAKE 1/2 TAB BY MOUTH EVERY DAY  - TSH with free T4 reflex; Future    Congenital abnormality of ear  - ondansetron (ZOFRAN) 4 MG tablet; Take 1 tablet (4 mg) by mouth every 6 hours as needed for nausea.    Sprain of interphalangeal joint of left great toe, initial encounter  - tiZANidine (ZANAFLEX) 4 MG tablet; Take 1 tablet (4 mg) by mouth at bedtime.    Gastric reflux  - omeprazole (PRILOSEC) 20 MG DR capsule; Take 1 capsule (20 mg) by mouth daily.    Osteoporosis, unspecified osteoporosis type, unspecified pathological fracture presence  - Vitamin D Deficiency; Future    Type 2 diabetes mellitus without complication, without long-term current use of insulin (H)  - Hemoglobin A1c; Future    Essential hypertension, benign  - Comprehensive metabolic panel (BMP + Alb, Alk Phos, ALT, AST, Total. Bili, TP); Future    Chronic Pain due to osteoarthritis  -Will refer for medical cannabis.      BMI  Estimated body mass index is 28.99 kg/m  as calculated from the following:    Height  "as of this encounter: 1.651 m (5' 5\").    Weight as of 10/2/24: 79 kg (174 lb 3.2 oz).     The longitudinal plan of care for the diagnosis(es)/condition(s) as documented were addressed during this visit. Due to the added complexity in care, I will continue to support Ismael in the subsequent management and with ongoing continuity of care.    I spent a total of 90  minutes in the care of this pt during today's office visit. This time includes reviewing the patient's chart and prior history, filing for medical marijuana, obtaining a history, performing an examination and evaluation and counseling the patient. This time also includes ordering medications or tests necessary in addition to communication to other member's of the patient's health care team. Time spent in documentation and care coordination is included.       Matilde LONG, BLUE        Subjective   Ismael is a 74 year old, presenting for the following:  RECHECK (Here for annual visit. )            Nadira Perez is a 74 year old female who presents for annual exam. She brings with her a document with updated history and issues she wished to follow-up on (see Media).    She is inquiring whether she needs follow-up for her osteoporosis.  She was followed most recently by Dr. Clark. Her last Reclast  infusion was 7/24/24.  She is asking if she should have another Dexa. Her last Dexa put her in the osteopenia category.  She is exercising several times a week but not necessarily weight bearing exercise. She is unsure of what her follow-up with Dr. Clark is and what to do about her osteoporosis/osteopenia.    She is having reflux when lying down from her hiatal hernia. She is getting a sleep number bed so she will be able to raise the head of her bed. She was taking Omeprazole but is concerned about long term effects. SHe would like to have a Manuel procedure done .    She is involved with multiple  therapies: PT and OT and balance, lymphedema, leg " therapy.  She is wearing a AFO brace on her right lower leg for foot drop and has noted recent involvement of the left leg as well.She has peripheral neuropathy in her feet.   .  Bilateral hip bursitis.          History of Present Illness       Reason for visit:  F/U of current conditions   She is taking medications regularly.    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    Corneal opacity    Hypothyroidism    Osteopenia, unspecified location    Aromatase inhibitor use    Chronic pain of right knee    DDD (degenerative disc disease), lumbar    Degenerative scoliosis in adult patient    Carpal tunnel syndrome of right wrist    Peripheral neuropathy    Spinal stenosis of lumbar region with neurogenic claudication    Spondylolisthesis of lumbar region    Brain lesion    Dermatochalasis of both upper eyelids    S/P spinal fusion    Chronic bilateral low back pain    PVD (posterior vitreous detachment), left eye    Vitreous opacities of left eye    Closed nondisplaced fracture of lateral malleolus of left fibula, initial encounter    Acute left ankle pain    Sacroiliitis (H)    Lumbar radiculopathy    Lumbosacral radiculopathy    Diabetes mellitus, type 2 (H)    Abnormal electrocardiogram    Ejection fraction < 50%    Closed fracture of sternum, unspecified portion of sternum, initial encounter    Congenital abnormality of ear    Sacroiliac joint pain    Stiffness of finger joint of  "right hand    Closed displaced fracture of neck of fifth metacarpal bone of right hand with routine healing, subsequent encounter    Right leg weakness    Right foot drop    Impairment of balance         Social Factors   Worry food won't last until get money to buy more No   Food not last or not have enough money for food? No   Do you have housing? (Housing is defined as stable permanent housing and does not include staying ouside in a car, in a tent, in an abandoned building, in an overnight shelter, or couch-surfing.) Yes   Are you worried about losing your housing? No   Lack of transportation? No   Unable to get utilities (heat,electricity)? No            10/11/2024   Fall Risk   Fallen 2 or more times in the past year? Yes   Trouble with walking or balance? Yes                 Social History     Tobacco Use    Smoking status: Never     Passive exposure: Never    Smokeless tobacco: Never   Vaping Use    Vaping status: Never Used   Substance Use Topics    Alcohol use: Yes     Alcohol/week: 7.0 standard drinks of alcohol     Types: 7 Glasses of wine per week     Comment: Rare if ever    Drug use: Yes     Types: Marijuana     Comment: Smoke through water filter for chronic back pain PRN.              ASCVD Risk   The 10-year ASCVD risk score (Nicole THAKKAR, et al., 2019) is: 26.3%    Values used to calculate the score:      Age: 74 years      Sex: Female      Is Non- : No      Diabetic: Yes      Tobacco smoker: No      Systolic Blood Pressure: 123 mmHg      Is BP treated: Yes      HDL Cholesterol: 56 mg/dL      Total Cholesterol: 214 mg/dL            Reviewed and updated as needed this visit by Provider                    Objective    Exam  /72 (BP Location: Right arm, Patient Position: Sitting, Cuff Size: Adult Regular)   Pulse 68   Ht 1.651 m (5' 5\")   LMP  (LMP Unknown)   SpO2 98%   BMI 28.99 kg/m     Estimated body mass index is 28.99 kg/m  as calculated from the " "following:    Height as of this encounter: 1.651 m (5' 5\").    Weight as of 10/2/24: 79 kg (174 lb 3.2 oz).    Physical Exam  GENERAL: alert and no distress  EYES: Eyes grossly normal to inspection, PERRL and conjunctivae and sclerae normal  HENT: ear canals with cerumen bilaterally, mouth without ulcers or lesions  NECK: no adenopathy, no asymmetry, masses, or scars  RESP: lungs clear to auscultation - no rales, rhonchi or wheezes  BREAST: surgically absent.  CV: regular rate and rhythm, normal S1 S2, no S3 or S4, no murmur, click or rub, no peripheral edema  ABDOMEN: soft, nontender, no hepatosplenomegaly, no masses and bowel sounds normal  MS: no gross musculoskeletal defects noted, no edema. Slightly protuberant right medial clavicular/mandibular joint.  SKIN: no suspicious lesions or rashes  NEURO: mentation intact and speech normal  Diabetic she had no sensations to monofilament exam except for two areas.   PSYCH: mentation appears normal, affect normal/bright  LYMPH: no cervical, supraclavicular, axillary, or adenopathy         No data to display                       Signed Electronically by: MERCEDES Kyle CNP    "

## 2024-10-12 NOTE — NURSING NOTE
Chief Complaint   Patient presents with    RECHECK     Here for annual visit.        Ciera Ochoa CMA, EMT at 7:53 AM on 10/12/2024.

## 2024-10-14 ENCOUNTER — THERAPY VISIT (OUTPATIENT)
Dept: PHYSICAL THERAPY | Facility: CLINIC | Age: 74
End: 2024-10-14
Payer: COMMERCIAL

## 2024-10-14 DIAGNOSIS — R29.898 RIGHT LEG WEAKNESS: Primary | ICD-10-CM

## 2024-10-14 DIAGNOSIS — M21.371 RIGHT FOOT DROP: ICD-10-CM

## 2024-10-14 DIAGNOSIS — R42 DIZZINESS: Primary | ICD-10-CM

## 2024-10-14 PROCEDURE — 97530 THERAPEUTIC ACTIVITIES: CPT | Mod: GP | Performed by: PHYSICAL THERAPIST

## 2024-10-14 PROCEDURE — 97110 THERAPEUTIC EXERCISES: CPT | Mod: GP | Performed by: PHYSICAL THERAPIST

## 2024-10-15 ENCOUNTER — OFFICE VISIT (OUTPATIENT)
Dept: ORTHOPEDICS | Facility: CLINIC | Age: 74
End: 2024-10-15
Payer: OTHER MISCELLANEOUS

## 2024-10-15 VITALS — HEIGHT: 65 IN | WEIGHT: 174 LBS | BODY MASS INDEX: 28.99 KG/M2

## 2024-10-15 DIAGNOSIS — R26.89 BALANCE PROBLEM: Primary | ICD-10-CM

## 2024-10-15 DIAGNOSIS — M21.371 RIGHT FOOT DROP: ICD-10-CM

## 2024-10-15 DIAGNOSIS — M19.032 PRIMARY OSTEOARTHRITIS OF BOTH WRISTS: Primary | ICD-10-CM

## 2024-10-15 DIAGNOSIS — R42 DIZZINESS: ICD-10-CM

## 2024-10-15 DIAGNOSIS — M19.031 PRIMARY OSTEOARTHRITIS OF BOTH WRISTS: Primary | ICD-10-CM

## 2024-10-15 PROCEDURE — 99214 OFFICE O/P EST MOD 30 MIN: CPT | Mod: 25 | Performed by: STUDENT IN AN ORGANIZED HEALTH CARE EDUCATION/TRAINING PROGRAM

## 2024-10-15 PROCEDURE — 20605 DRAIN/INJ JOINT/BURSA W/O US: CPT | Mod: 50 | Performed by: STUDENT IN AN ORGANIZED HEALTH CARE EDUCATION/TRAINING PROGRAM

## 2024-10-15 RX ORDER — LIDOCAINE HYDROCHLORIDE 10 MG/ML
1 INJECTION, SOLUTION INFILTRATION; PERINEURAL
Status: COMPLETED | OUTPATIENT
Start: 2024-10-15 | End: 2024-10-15

## 2024-10-15 RX ORDER — TRIAMCINOLONE ACETONIDE 40 MG/ML
40 INJECTION, SUSPENSION INTRA-ARTICULAR; INTRAMUSCULAR
Status: COMPLETED | OUTPATIENT
Start: 2024-10-15 | End: 2024-10-15

## 2024-10-15 RX ADMIN — LIDOCAINE HYDROCHLORIDE 1 ML: 10 INJECTION, SOLUTION INFILTRATION; PERINEURAL at 15:27

## 2024-10-15 RX ADMIN — TRIAMCINOLONE ACETONIDE 40 MG: 40 INJECTION, SUSPENSION INTRA-ARTICULAR; INTRAMUSCULAR at 15:27

## 2024-10-15 NOTE — LETTER
10/15/2024      Nadira Perez  [address]  [facility] [address]      Dear Colleague,    Thank you for referring your patient, Nadira Perez, to the [facility] [place]. Please see a copy of my visit note below.    Orthopaedic Surgery Hand Clinic Progress Note    Patient Name: Nadira Perez  MRN: [id number]  : 1950  Date: Oct 15, 2024    Date of Surgery: 21    Surgery Performed: 1) Open right carpal tunnel release  2) Removal of buried implant (deep) right wrist    Subjective:     10/15/24:  Patient last sen on 24 at which point patient had bilateral radiocarpal injections.  As with previous injections, she received relief for a short duration but cannot recall how long.  Wrist pain has returned even with using the brace.  She did sustain a fall in fractured the fifth metacarpal neck.  This was treated nonoperatively.  She has been undergoing treatment for SI pain.    24: Patient was last seen 23. She received bilateral radiocarpal joint injections at her last visit and said the injections took 2-3 weeks to set in. They provided relief until pain returned 2 weeks ago. Her left is more bothersome than right. She is interested in repeating injections today and would like a topical prescribed as well. Patient had a recent fall and fractured her sternum.    23: Patient was last seen 23. She received left radiocarpal injection at her last visit that provided relief for about 2 months. She would like both wrists injected today. She states the cramping of her fingers has improved.     23: Patient was last seen 23. She had ultrasound completed 6/15/23 and is here to review results today. Since last visit she reports continued spasms and cramping of the fingers in her left hand. She continues to have cramping/locking of her fingers with certain postures like putting on earrings or her bra. Seems to most affected the ulnar  "sided digits. She does not have classic pain or paresthesias in the median nerve distribution. No nocturnal symptoms. She has been wearing a wrist brace, using Tylenol and gabapentin.  She is interested in repeating the left radiocarpal  injection today.    4/25/23: Patient was last seen 2/14/23 at which time I provided bilateral radiocarpal injections for wrist OA and left middle finger trigger injection. She is still having relief from all three injections. MF triggering seems to have resolved. Main concern today is cramping of the fingers. Ongoing 6 months, worse over the last 2. She states the thumb, index, middle, and ring fingers seem to lock up with certain hand postures like when putting on earrings or wearing her bra. She does not have classic pain or paresthesias in the median nerve distribution. No nocturnal symptoms. She has been wearing a wrist brace, using Tylenol and gabapentin.    Update 2/14/2023 Patient last seen on 8/9/22. At which time patient had cortisone injection for A1 pulley of left ring finger and bilateral radiocarpal injections. She states these are quite effective for a while after the injections. Patient was to continue wearing  She returns today for repeat radiocarpal injections. She has developed dorsal radial swelling over the carpus more pronounced on the right. She has developed locking/catching of the left long finger as well.    8/9/22: Patient was last seen 5/3/22 at which time I provided her with bilateral radiocarpal injections and new braces. She notes since last visit she sustained a fall around 5/4, and sustained nondisplaced fractures to left ring finger and right thumb for which she was treated by Dr. Miguel. She has healed fine from those, states thumb IP still a bit stiff. She has continued to have bilateral wrist pain and notes new onset of \"spasms\" in left hand with gripping/ grasping objections within the last 2-3 weeks. She states that her left small and ring " fingers seem to lock and spasm with /finger flexion. She has to forcibly extend it sometimes to make them straight with pain. She states this must have happened 6-7 times over the last 2 months.      Objective:  LMP  (LMP Unknown)     General: alert, appropriate, NAD  HEENT: NC/AT  CV: RRR by pulse  Pulm: Unlabored on RA  MSK:  BUE   Volar and dorsal incisions c/d/i, no e/o infection  No tenderness of the right thumb  Moderate pain to palpation dorsal central wrists bilaterally  Mild effusion and capsular swelling over bilateral dorsal radial wrist over the carpus  There is mild crepitus felt over the small finger with flexion/extension - seems to be associated with the extensor tendon but it is variably reproducible  ROM baseline  Intact EPL/FPL/APB/HI  Diminished sensation median nerve distribution baseline  Negative Tinel's at the left carpal tunnel, no crepitus, there is fullness of the volar wrist proximal to carpal tunnel likely consistent with flexor tenosynovitis  +TTP scaphoid tubercle where an osteophyte is felt.  WWP CR< 2s    Imaging:  None new. XRs of left ring finger and right hand from 5/4/2022 and 5/31/2022 reviewed.  These demonstrate nondisplaced fractures of the right thumb distal phalanx and left ring distal phalanx that have healed.      Repeat XRs of bilateral wrists 2/14/23 demonstrates Unchanged alignment and appearance of 4 corner fusion on the left with ulnar subluxation of the carpus. Significant volar carpal osteophytes. 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     EMG/NCS 10/21/21  severe right median neuropathy at the wrist (carpal tunnel syndrome); no significant findings median or ulnar nerve on the left     Ultrasound 6/15/23  Impression:      1. No sonographic evidence of left carpal tunnel syndrome.     2. Tenosynovitis of the flexor carpi radialis.     3. Normal sonographic appearance of flexor tendon motion within the carpal  tunnel.    CT of right hand 9/12/2024 demonstrates interval healing of the fifth metacarpal neck fracture.  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 Right s/p hardware removal for second failed 4 corner fusion, and carpal tunnel release.    Patient's left hand cramping has returned intermittently. It is not consistent with carpal tunnel syndrome. EMG/NCS 10/21/21 also negative on the left. Ultrasound did not show any mechanical impingement.    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  We again discussed surgical options including a total wrist fusion versus total wrist arthroplasty.  We discussed the details, risk, benefits of each.  Given that she has failed 2 other prior fusion surgeries, my main concern would be that she would not heal a total wrist fusion.  Given the sclerotic and previously operated carpal bones, removing them and performing a total wrist arthroplasty may actually be viable.  However advised that this would be more successful if she were more sedentary.    At this time, she is not ready to proceed with surgery.  She wishes to have bilateral radiocarpal injection today.    After obtaining written consent, I cleansed the skin over the dorsal wrists  with chlorhexidine.  I then anesthetized skin with ethyl chloride freeze spray after which I injected 1 cc of 1% lidocaine and 40 mg of Kenalog into the bilateral radiocarpal joint through the 3-4 portal.  The patient tolerated this well without complication.    Continue brace wear. Continue radiocarpal injections 3 times a year for symptomatic control until she wishes to proceed with surgery.    All questions answered, the patient voiced understanding and agreement    Rolan Conley MD    Hand, Upper Extremity & Microvascular Surgery  Department of Orthopedic Surgery  [facility]  [place]      Medium Joint Injection/Arthrocentesis: bilateral radiocarpal    Date/Time: 10/15/2024 3:27 PM    Performed by: Rolan Conley MD  Authorized by: Rolan Conley MD    Indications:  Pain  Needle Size:  25 G  Guidance: surface landmarks    Location:  Wrist  Laterality:  Bilateral  Site:  Bilateral radiocarpal  Medications (Right):  1 mL lidocaine 1 %; 40 mg triamcinolone 40 MG/ML  Medications (Left):  1 mL lidocaine 1 %; 40 mg triamcinolone 40 MG/ML  Outcome:  Tolerated well, no immediate complications  Procedure discussed: discussed risks, benefits, and alternatives    Consent Given by:  Patient  Timeout: timeout called immediately prior to procedure    Prep: patient was prepped and draped in usual sterile fashion                        Again, thank you for allowing me to participate in the care of your patient.        Sincerely,        Rolan Conley MD

## 2024-10-15 NOTE — PROGRESS NOTES
Orthopaedic Surgery Hand Clinic Progress Note    Patient Name: Nadira Perez  MRN: [id number]  : 1950  Date: Oct 15, 2024    Date of Surgery: 21    Surgery Performed: 1) Open right carpal tunnel release  2) Removal of buried implant (deep) right wrist    Subjective:     10/15/24:  Patient last sen on 24 at which point patient had bilateral radiocarpal injections.  As with previous injections, she received relief for a short duration but cannot recall how long.  Wrist pain has returned even with using the brace.  She did sustain a fall in fractured the fifth metacarpal neck.  This was treated nonoperatively.  She has been undergoing treatment for SI pain.    24: Patient was last seen 23. She received bilateral radiocarpal joint injections at her last visit and said the injections took 2-3 weeks to set in. They provided relief until pain returned 2 weeks ago. Her left is more bothersome than right. She is interested in repeating injections today and would like a topical prescribed as well. Patient had a recent fall and fractured her sternum.    23: Patient was last seen 23. She received left radiocarpal injection at her last visit that provided relief for about 2 months. She would like both wrists injected today. She states the cramping of her fingers has improved.     23: Patient was last seen 23. She had ultrasound completed 6/15/23 and is here to review results today. Since last visit she reports continued spasms and cramping of the fingers in her left hand. She continues to have cramping/locking of her fingers with certain postures like putting on earrings or her bra. Seems to most affected the ulnar sided digits. She does not have classic pain or paresthesias in the median nerve distribution. No nocturnal symptoms. She has been wearing a wrist brace, using Tylenol and gabapentin.  She is interested in repeating the left radiocarpal  injection  "today.    4/25/23: Patient was last seen 2/14/23 at which time I provided bilateral radiocarpal injections for wrist OA and left middle finger trigger injection. She is still having relief from all three injections. MF triggering seems to have resolved. Main concern today is cramping of the fingers. Ongoing 6 months, worse over the last 2. She states the thumb, index, middle, and ring fingers seem to lock up with certain hand postures like when putting on earrings or wearing her bra. She does not have classic pain or paresthesias in the median nerve distribution. No nocturnal symptoms. She has been wearing a wrist brace, using Tylenol and gabapentin.    Update 2/14/2023 Patient last seen on 8/9/22. At which time patient had cortisone injection for A1 pulley of left ring finger and bilateral radiocarpal injections. She states these are quite effective for a while after the injections. Patient was to continue wearing  She returns today for repeat radiocarpal injections. She has developed dorsal radial swelling over the carpus more pronounced on the right. She has developed locking/catching of the left long finger as well.    8/9/22: Patient was last seen 5/3/22 at which time I provided her with bilateral radiocarpal injections and new braces. She notes since last visit she sustained a fall around 5/4, and sustained nondisplaced fractures to left ring finger and right thumb for which she was treated by Dr. Miguel. She has healed fine from those, states thumb IP still a bit stiff. She has continued to have bilateral wrist pain and notes new onset of \"spasms\" in left hand with gripping/ grasping objections within the last 2-3 weeks. She states that her left small and ring fingers seem to lock and spasm with /finger flexion. She has to forcibly extend it sometimes to make them straight with pain. She states this must have happened 6-7 times over the last 2 months.      Objective:  LMP  (LMP Unknown)     General: " alert, appropriate, NAD  HEENT: NC/AT  CV: RRR by pulse  Pulm: Unlabored on RA  MSK:  BUE   Volar and dorsal incisions c/d/i, no e/o infection  No tenderness of the right thumb  Moderate pain to palpation dorsal central wrists bilaterally  Mild effusion and capsular swelling over bilateral dorsal radial wrist over the carpus  There is mild crepitus felt over the small finger with flexion/extension - seems to be associated with the extensor tendon but it is variably reproducible  ROM baseline  Intact EPL/FPL/APB/HI  Diminished sensation median nerve distribution baseline  Negative Tinel's at the left carpal tunnel, no crepitus, there is fullness of the volar wrist proximal to carpal tunnel likely consistent with flexor tenosynovitis  +TTP scaphoid tubercle where an osteophyte is felt.  WWP CR< 2s    Imaging:  None new. XRs of left ring finger and right hand from 5/4/2022 and 5/31/2022 reviewed.  These demonstrate nondisplaced fractures of the right thumb distal phalanx and left ring distal phalanx that have healed.      Repeat XRs of bilateral wrists 2/14/23 demonstrates Unchanged alignment and appearance of 4 corner fusion on the left with ulnar subluxation of the carpus. Significant volar carpal osteophytes. 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     EMG/NCS 10/21/21  severe right median neuropathy at the wrist (carpal tunnel syndrome); no significant findings median or ulnar nerve on the left     Ultrasound 6/15/23  Impression:      1. No sonographic evidence of left carpal tunnel syndrome.     2. Tenosynovitis of the flexor carpi radialis.     3. Normal sonographic appearance of flexor tendon motion within the carpal tunnel.    CT of right hand 9/12/2024 demonstrates interval healing of the fifth metacarpal neck fracture.  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  Right s/p hardware removal for second failed 4 corner fusion, and carpal tunnel release.    Patient's left hand cramping has returned intermittently. It is not consistent with carpal tunnel syndrome. EMG/NCS 10/21/21 also negative on the left. Ultrasound did not show any mechanical impingement.    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  We again discussed surgical options including a total wrist fusion versus total wrist arthroplasty.  We discussed the details, risk, benefits of each.  Given that she has failed 2 other prior fusion surgeries, my main concern would be that she would not heal a total wrist fusion.  Given the sclerotic and previously operated carpal bones, removing them and performing a total wrist arthroplasty may actually be viable.  However advised that this would be more successful if she were more sedentary.    At this time, she is not ready to proceed with surgery.  She wishes to have bilateral radiocarpal injection today.    After obtaining written consent, I cleansed the skin over the dorsal wrists  with chlorhexidine.  I then anesthetized skin with ethyl chloride freeze spray after which I injected 1 cc of 1% lidocaine and 40 mg of Kenalog into the bilateral radiocarpal joint through the 3-4 portal.  The patient tolerated this well without complication.    Continue brace wear. Continue radiocarpal injections 3 times a year for symptomatic control until she wishes to proceed with surgery.    All questions answered, the patient voiced understanding and agreement    Rolan Conley MD    Hand, Upper Extremity & Microvascular Surgery  Department of Orthopedic Surgery  [facility]      Medium Joint Injection/Arthrocentesis: bilateral radiocarpal    Date/Time: 10/15/2024 3:27 PM    Performed by: Rolan Conley MD  Authorized by: Rolan Conley MD    Indications:  Pain  Needle Size:  25 G  Guidance: surface landmarks    Location:   Wrist  Laterality:  Bilateral  Site:  Bilateral radiocarpal  Medications (Right):  1 mL lidocaine 1 %; 40 mg triamcinolone 40 MG/ML  Medications (Left):  1 mL lidocaine 1 %; 40 mg triamcinolone 40 MG/ML  Outcome:  Tolerated well, no immediate complications  Procedure discussed: discussed risks, benefits, and alternatives    Consent Given by:  Patient  Timeout: timeout called immediately prior to procedure    Prep: patient was prepped and draped in usual sterile fashion

## 2024-10-15 NOTE — PROGRESS NOTES
New PT referral placed, per PT request, to incorporate balance, right foot drop, and dizziness into treatment plan.     Renée Harris, RAJAN, APRN, AGNP-C  [facility]

## 2024-10-16 ENCOUNTER — OFFICE VISIT (OUTPATIENT)
Dept: PLASTIC SURGERY | Facility: CLINIC | Age: 74
End: 2024-10-16
Payer: COMMERCIAL

## 2024-10-16 VITALS
OXYGEN SATURATION: 97 % | DIASTOLIC BLOOD PRESSURE: 84 MMHG | HEIGHT: 65 IN | SYSTOLIC BLOOD PRESSURE: 126 MMHG | BODY MASS INDEX: 28.96 KG/M2 | HEART RATE: 65 BPM

## 2024-10-16 DIAGNOSIS — H61.301: Primary | ICD-10-CM

## 2024-10-16 PROCEDURE — 99213 OFFICE O/P EST LOW 20 MIN: CPT | Performed by: STUDENT IN AN ORGANIZED HEALTH CARE EDUCATION/TRAINING PROGRAM

## 2024-10-16 RX ORDER — CEFAZOLIN SODIUM 2 G/50ML
2 SOLUTION INTRAVENOUS
OUTPATIENT
Start: 2024-10-16

## 2024-10-16 RX ORDER — CEFAZOLIN SODIUM 2 G/50ML
2 SOLUTION INTRAVENOUS SEE ADMIN INSTRUCTIONS
OUTPATIENT
Start: 2024-10-16

## 2024-10-16 ASSESSMENT — PAIN SCALES - GENERAL: PAINLEVEL: NO PAIN (0)

## 2024-10-16 NOTE — LETTER
10/16/2024       RE: Nadira Perez  [address]  [facility] [address]     Dear Colleague,    Thank you for referring your patient, Nadira Perez, to the [facility] [place] at [facility]. Please see a copy of my visit note below.    PRS    Patient presents for reevaluation.  Despite some early initial encouraging outcome, patient is still having difficulty with maintaining the right hearing aid over the right auricular helix.  However, she can wear her glasses without issue, so perhaps we made an incremental improvement.  She is concerned that since she is still reliant on the hearing aid, that the consider an additional surgery.    On exam, well healed right auricular scars with slightly folded over and softer auricular cartilage with difficulty keeping the hearing aid on the right ear without folding up the auricular helix.    A/P: 74-year-old female 3 months status post right functional otoplasty    -Although there has perhaps been some improvement, patient still has difficulty with maintaining the hearing aid over the right ear.  On examination today, it appears that the native cartilage that has been multiply operated is simply too weak to maintain the right auricular helix and more upright and functional position.  The PDS suturing along with the ipsilateral cartilage grafting seems to have been inadequate to maintain structural integrity.  It was very reasonable to approach this more conservatively, to not commit to a donor site.  Explained that it may be that revising the otoplasty with structural cartilage graft from the left ear may help with this issue.  Patient would like me to try.  -Discussed the risks of surgery, including but not limited to: infection, bleeding, pain, poor scarring, suboptimal aesthetic result, residual deformity, recurrence of deformity, donor site defect, wound  healing issues, asymmetries, anesthesia related complication, cardiac arrest.  Despite these risks, patient consents to and would like to proceed with right revision functional otoplasty with use of a cartilage graft from the left ear.  All questions answered.   -Case request to be placed      Again, thank you for allowing me to participate in the care of your patient.      Sincerely,    Juan Agudelo MD

## 2024-10-16 NOTE — PROGRESS NOTES
PRS    Patient presents for reevaluation.  Despite some early initial encouraging outcome, patient is still having difficulty with maintaining the right hearing aid over the right auricular helix.  However, she can wear her glasses without issue, so perhaps we made an incremental improvement.  She is concerned that since she is still reliant on the hearing aid, that the consider an additional surgery.    On exam, well healed right auricular scars with slightly folded over and softer auricular cartilage with difficulty keeping the hearing aid on the right ear without folding up the auricular helix.    A/P: 74-year-old female 3 months status post right functional otoplasty    -Although there has perhaps been some improvement, patient still has difficulty with maintaining the hearing aid over the right ear.  On examination today, it appears that the native cartilage that has been multiply operated is simply too weak to maintain the right auricular helix and more upright and functional position.  The PDS suturing along with the ipsilateral cartilage grafting seems to have been inadequate to maintain structural integrity.  It was very reasonable to approach this more conservatively, to not commit to a donor site.  Explained that it may be that revising the otoplasty with structural cartilage graft from the left ear may help with this issue.  Patient would like me to try.  -Discussed the risks of surgery, including but not limited to: infection, bleeding, pain, poor scarring, suboptimal aesthetic result, residual deformity, recurrence of deformity, donor site defect, wound healing issues, asymmetries, anesthesia related complication, cardiac arrest.  Despite these risks, patient consents to and would like to proceed with right revision functional otoplasty with use of a cartilage graft from the left ear.  All questions answered.   -Case request to be placed

## 2024-10-16 NOTE — NURSING NOTE
"Chief Complaint   Patient presents with    Surgical Followup     3 month follow-up, DOS 7/25/24.       Vitals:    10/16/24 1635   BP: 126/84   BP Location: Left arm   Patient Position: Sitting   Cuff Size: Adult Regular   Pulse: 65   SpO2: 97%   Height: 5' 5\"       Body mass index is 28.96 kg/m .      Adi Anton, EMT    "

## 2024-10-21 ENCOUNTER — THERAPY VISIT (OUTPATIENT)
Dept: OCCUPATIONAL THERAPY | Facility: CLINIC | Age: 74
End: 2024-10-21
Payer: COMMERCIAL

## 2024-10-21 DIAGNOSIS — S62.336D CLOSED DISPLACED FRACTURE OF NECK OF FIFTH METACARPAL BONE OF RIGHT HAND WITH ROUTINE HEALING, SUBSEQUENT ENCOUNTER: ICD-10-CM

## 2024-10-21 DIAGNOSIS — M25.641 STIFFNESS OF FINGER JOINT OF RIGHT HAND: Primary | ICD-10-CM

## 2024-10-21 PROCEDURE — 97140 MANUAL THERAPY 1/> REGIONS: CPT | Mod: GO | Performed by: OCCUPATIONAL THERAPIST

## 2024-10-21 PROCEDURE — 97110 THERAPEUTIC EXERCISES: CPT | Mod: GO | Performed by: OCCUPATIONAL THERAPIST

## 2024-10-22 ENCOUNTER — ANCILLARY PROCEDURE (OUTPATIENT)
Dept: GENERAL RADIOLOGY | Facility: CLINIC | Age: 74
End: 2024-10-22
Attending: FAMILY MEDICINE
Payer: COMMERCIAL

## 2024-10-22 ENCOUNTER — OFFICE VISIT (OUTPATIENT)
Dept: ORTHOPEDICS | Facility: CLINIC | Age: 74
End: 2024-10-22
Payer: COMMERCIAL

## 2024-10-22 DIAGNOSIS — M79.671 RIGHT FOOT PAIN: Primary | ICD-10-CM

## 2024-10-22 DIAGNOSIS — M79.671 RIGHT FOOT PAIN: ICD-10-CM

## 2024-10-22 DIAGNOSIS — S97.101A CRUSHING INJURY OF TOE OF RIGHT FOOT, INITIAL ENCOUNTER: Primary | ICD-10-CM

## 2024-10-22 PROCEDURE — 73630 X-RAY EXAM OF FOOT: CPT | Mod: RT | Performed by: RADIOLOGY

## 2024-10-22 PROCEDURE — 99213 OFFICE O/P EST LOW 20 MIN: CPT | Performed by: FAMILY MEDICINE

## 2024-10-22 NOTE — LETTER
10/22/2024      RE: Nadira Perez  [address]  [facility] [address]     Dear Colleague,    Thank you for referring your patient, Nadira Perez, to the [facility] [place]. Please see a copy of my visit note below.    CHIEF COMPLAINT:  Pain of the Right Foot     HISTORY OF PRESENT ILLNESS  Ms. Perez is a pleasant 74 year old year old female who presents to clinic today with a right 4th toe injury.  Nadira explains that she accidentally dropped her thermus onto her foot yesterday and has noticed pain over her 4th toe since. Swelling present over the 4th toe.     Onset: sudden  Location: right foot  Quality:  sharp and tender  Duration: 1 days   Severity: 10/10 at worst  Timing: Worse at night/walking  Modifying factors:  resting and non-use makes it better, movement and use makes it worse  Associated signs & symptoms: pain, tenderness, and swelling  Previous similar pain: Yes  Treatments to date:Icing     Additional history: as documented    Review of Systems:  Have you recently had a a fever, chills, weight loss? No  Do you have any vision problems? No  Do you have any chest pain or edema? No  Do you have any shortness of breath or wheezing?  No  Do you have stomach problems? No  Do you have any numbness or focal weakness? No  Do you have diabetes? Yes, Type 2  Do you have problems with bleeding or clotting? No  Do you have an rashes or other skin lesions? 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     Corneal opacity     Hypothyroidism     Osteopenia, unspecified location     Aromatase inhibitor use     Chronic pain of right knee     DDD (degenerative disc disease), lumbar     Degenerative scoliosis in adult patient     Carpal tunnel syndrome of right wrist     Peripheral neuropathy     Spinal stenosis of lumbar region with neurogenic claudication     Spondylolisthesis of lumbar region     Brain lesion     Dermatochalasis of both upper eyelids     S/P spinal fusion     Chronic bilateral low back pain     PVD (posterior vitreous detachment), left eye     Vitreous opacities of left eye     Closed nondisplaced fracture of lateral malleolus of left fibula, initial encounter     Acute left ankle pain     Sacroiliitis (H)     Lumbar radiculopathy     Diabetes mellitus, type 2 (H)     Abnormal electrocardiogram     Ejection fraction < 50%     Closed fracture of sternum, unspecified portion of sternum, initial encounter     Congenital abnormality of ear     Sacroiliac joint pain     Stiffness of finger joint of right hand     Closed displaced fracture of neck of fifth metacarpal bone of right hand with routine healing, subsequent encounter     Right leg weakness     Right foot drop     Impairment of balance       Current Outpatient Medications   Medication Sig Dispense Refill     acetaminophen (TYLENOL) 500 MG tablet Take 1,000 mg by mouth 3 times daily       artificial tears OINT ophthalmic ointment Place into both eyes nightly as needed for dry eyes.       Ascorbic Acid (VITAMIN C) 500 MG CHEW Take 1 tablet by mouth every morning       atorvastatin (LIPITOR) 80 MG tablet Take 1 tablet (80 mg) by mouth daily. 90 tablet 3     carvedilol (COREG) 12.5 MG tablet Take 1 tablet (12.5 mg) by mouth 2 times daily (with meals). 180 tablet 3     CRANBERRY EXTRACT PO Take 500 mg by mouth every morning       diclofenac  (VOLTAREN) 1 % topical gel Apply 2 g topically 4 times daily (Patient taking differently: Apply 2 g topically 4 times daily as needed for moderate pain.) 100 g 6     gabapentin (NEURONTIN) 300 MG capsule Take 4 capsules (1,200 mg) by mouth 3 times daily. 1080 capsule 3     HEMP OIL OR EXTRACT OR OTHER CBD CANNABINOID, NOT MEDICAL CANNABIS, THC       hydrochlorothiazide (HYDRODIURIL) 25 MG tablet Take 1 tablet (25 mg) by mouth daily. 90 day supply denied 30 tablet 3     levothyroxine (SYNTHROID/LEVOTHROID) 112 MCG tablet TAKE 1/2 TAB BY MOUTH EVERY DAY 45 tablet 3     lisinopril (ZESTRIL) 20 MG tablet Take 1 tablet (20 mg) by mouth 2 times daily. 180 tablet 3     MAGNESIUM GLYCINATE PO Take 400 mg by mouth at bedtime.       Melatonin 10 MG TABS tablet Take 10 mg by mouth nightly as needed.       Multiple Vitamin (MULTI-VITAMIN) per tablet Take 1 tablet by mouth every morning       omeprazole (PRILOSEC) 20 MG DR capsule Take 1 capsule (20 mg) by mouth daily. 90 capsule 3     ondansetron (ZOFRAN) 4 MG tablet Take 1 tablet (4 mg) by mouth every 6 hours as needed for nausea. 12 tablet 3     ondansetron (ZOFRAN-ODT) 4 MG ODT tab Take 1 tablet (4 mg) by mouth every 12 hours as needed for nausea 180 tablet 3     spironolactone (ALDACTONE) 50 MG tablet Take 1 tablet (50 mg) by mouth daily at 2 pm. 90 tablet 3     tiZANidine (ZANAFLEX) 4 MG tablet Take 1 tablet (4 mg) by mouth at bedtime. (Patient taking differently: Take 4 mg by mouth nightly as needed for muscle spasms.) 90 tablet 3     Turmeric 500 MG CAPS Take 500 mg by mouth every morning         Allergies   Allergen Reactions     Erythromycin Nausea     Penicillins Hives     Around age 6 - doesn't recall full reaction, mother told her it was hives.     Has tolerated cephalsporins.        Family History   Problem Relation Age of Onset     Neurologic Disorder Mother         Anuerysm of Cerebral Artery, Dementia     Diabetes Mother      Thyroid Disease Mother          Hyperthyroidism (goiter)     Cerebrovascular Disease Mother         Hemorrhagic     Dementia Mother      Osteoporosis Mother      Hyperlipidemia Mother      Heart Disease Father         AAA     Hypertension Father      Coronary Artery Disease Father      Hyperlipidemia Father      Mental Illness Father         Schizophrenia?     Dementia Brother         hydrocephalis     Circulatory Brother         Perihperal Neurophathy     Peripheral Neuropathy Brother      Peripheral Neuropathy Brother      Diabetes Maternal Grandmother         Presumed Type 2     Asthma Maternal Grandmother      Chronic Obstructive Pulmonary Disease Maternal Grandfather         father     Asthma Maternal Grandfather      Diabetes Maternal Aunt         x2     Melanoma Maternal Aunt      Glaucoma Maternal Aunt      Breast Cancer Cousin      Breast Cancer Cousin      Dementia Other      Cancer Other         malignant melanoma     Hypertension Other      Hypertension Other      Cerebrovascular Disease Other      Cerebrovascular Disease Other      Obesity Other      Respiratory Other      Cancer Other      Diabetes Other      Asthma Other      Other Cancer Other         Malignant melanoma     Obesity Other      Macular Degeneration No family hx of      Kidney Disease No family hx of      Thrombosis No family hx of      Arthritis No family hx of      Depression No family hx of      Substance Abuse No family hx of      Cystic Fibrosis No family hx of      Early Death No family hx of      Coronary Artery Disease Early Onset No family hx of      Heart Failure No family hx of      Bleeding Diathesis No family hx of      Ovarian Cancer No family hx of      Uterine Cancer No family hx of      Prostate Cancer No family hx of      Colorectal Cancer No family hx of      Pancreatic Cancer No family hx of      Lung Cancer No family hx of      Autoimmune Disease No family hx of      Unknown/Adopted No family hx of      Genetic Disorder No family hx of       Bleeding Disorder No family hx of      Clotting Disorder No family hx of      Anesthesia Reaction No family hx of        Additional medical/Social/Surgical histories reviewed in [facility] and updated as appropriate.       PHYSICAL EXAM  LMP  (LMP Unknown)     General  - normal appearance, in no obvious distress  Musculoskeletal - right foot  - stance: Mildly antalgic gait  - inspection: Swelling of right fourth toe, mild erythema and ecchymosis diffusely right fourth toe.  - palpation: Diffuse fourth proximal, middle and distal phalanx tenderness of fourth toe.  - ROM: Decreased ROM of fourth toe.  - strength:Grossly intact  Neuro  - no sensory or motor deficit, grossly normal coordination, normal muscle tone      IMAGING : XR right foot . Final results and radiologist's interpretation, available in the [facility] health record. Images were reviewed with the patient/family members in the office today. My personal interpretation of the performed imaging is      ASSESSMENT & PLAN  Ms. Perez is a 74 year old year old female hx osteoporosis on Eviniti who presents to clinic today with acute right fourth toe pain after crush injury dropping steel water bottle on her toe yesterday.    XR and exam overall reassuring. No evidence for fracture noted. Discussed possibility that a very small hairline fracture could exist, may show up in 10-14 days.  Regardless of fracture status we will treat symptomatically as if it were a fracture.    Diagnosis: Crush injury of right foot    -Pasquale taping provided today and instructed on daily taping  -Discussed post-op shoe vs. Her shoe with AFO. AFO is quite rigid to forefoot and may use this for symptomatic relief. Continue firm soled shoe/AFO and avoid barefoot walking.  -Icing daily 15-20 minutes every 2-3 hours, elevation  -OTC tylenol PRN up to 3 grams daily (1g TID max)  -Follow up 4 weeks if pain persists, consider repeat xray at that time.    It was a pleasure seeing Nadira  today.    Willis Borjas DO, [facility]  Primary Care Sports Medicine      Again, thank you for allowing me to participate in the care of your patient.      Sincerely,    Willis Borjas DO

## 2024-10-22 NOTE — PROGRESS NOTES
CHIEF COMPLAINT:  Pain of the Right Foot     HISTORY OF PRESENT ILLNESS  Ms. Perez is a pleasant 74 year old year old female who presents to clinic today with a right 4th toe injury.  Nadira explains that she accidentally dropped her thermus onto her foot yesterday and has noticed pain over her 4th toe since. Swelling present over the 4th toe.     Onset: sudden  Location: right foot  Quality:  sharp and tender  Duration: 1 days   Severity: 10/10 at worst  Timing: Worse at night/walking  Modifying factors:  resting and non-use makes it better, movement and use makes it worse  Associated signs & symptoms: pain, tenderness, and swelling  Previous similar pain: Yes  Treatments to date:Icing     Additional history: as documented    Review of Systems:  Have you recently had a a fever, chills, weight loss? No  Do you have any vision problems? No  Do you have any chest pain or edema? No  Do you have any shortness of breath or wheezing?  No  Do you have stomach problems? No  Do you have any numbness or focal weakness? No  Do you have diabetes? Yes, Type 2  Do you have problems with bleeding or clotting? No  Do you have an rashes or other skin lesions? 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    Corneal opacity    Hypothyroidism    Osteopenia, unspecified location    Aromatase inhibitor use    Chronic pain of right knee    DDD (degenerative disc disease), lumbar     Degenerative scoliosis in adult patient    Carpal tunnel syndrome of right wrist    Peripheral neuropathy    Spinal stenosis of lumbar region with neurogenic claudication    Spondylolisthesis of lumbar region    Brain lesion    Dermatochalasis of both upper eyelids    S/P spinal fusion    Chronic bilateral low back pain    PVD (posterior vitreous detachment), left eye    Vitreous opacities of left eye    Closed nondisplaced fracture of lateral malleolus of left fibula, initial encounter    Acute left ankle pain    Sacroiliitis (H)    Lumbar radiculopathy    Diabetes mellitus, type 2 (H)    Abnormal electrocardiogram    Ejection fraction < 50%    Closed fracture of sternum, unspecified portion of sternum, initial encounter    Congenital abnormality of ear    Sacroiliac joint pain    Stiffness of finger joint of right hand    Closed displaced fracture of neck of fifth metacarpal bone of right hand with routine healing, subsequent encounter    Right leg weakness    Right foot drop    Impairment of balance       Current Outpatient Medications   Medication Sig Dispense Refill    acetaminophen (TYLENOL) 500 MG tablet Take 1,000 mg by mouth 3 times daily      artificial tears OINT ophthalmic ointment Place into both eyes nightly as needed for dry eyes.      Ascorbic Acid (VITAMIN C) 500 MG CHEW Take 1 tablet by mouth every morning      atorvastatin (LIPITOR) 80 MG tablet Take 1 tablet (80 mg) by mouth daily. 90 tablet 3    carvedilol (COREG) 12.5 MG tablet Take 1 tablet (12.5 mg) by mouth 2 times daily (with meals). 180 tablet 3    CRANBERRY EXTRACT PO Take 500 mg by mouth every morning      diclofenac (VOLTAREN) 1 % topical gel Apply 2 g topically 4 times daily (Patient taking differently: Apply 2 g topically 4 times daily as needed for moderate pain.) 100 g 6    gabapentin (NEURONTIN) 300 MG capsule Take 4 capsules (1,200 mg) by mouth 3 times daily. 1080 capsule 3    HEMP OIL OR EXTRACT OR OTHER CBD CANNABINOID, NOT  MEDICAL CANNABIS, THC      hydrochlorothiazide (HYDRODIURIL) 25 MG tablet Take 1 tablet (25 mg) by mouth daily. 90 day supply denied 30 tablet 3    levothyroxine (SYNTHROID/LEVOTHROID) 112 MCG tablet TAKE 1/2 TAB BY MOUTH EVERY DAY 45 tablet 3    lisinopril (ZESTRIL) 20 MG tablet Take 1 tablet (20 mg) by mouth 2 times daily. 180 tablet 3    MAGNESIUM GLYCINATE PO Take 400 mg by mouth at bedtime.      Melatonin 10 MG TABS tablet Take 10 mg by mouth nightly as needed.      Multiple Vitamin (MULTI-VITAMIN) per tablet Take 1 tablet by mouth every morning      omeprazole (PRILOSEC) 20 MG DR capsule Take 1 capsule (20 mg) by mouth daily. 90 capsule 3    ondansetron (ZOFRAN) 4 MG tablet Take 1 tablet (4 mg) by mouth every 6 hours as needed for nausea. 12 tablet 3    ondansetron (ZOFRAN-ODT) 4 MG ODT tab Take 1 tablet (4 mg) by mouth every 12 hours as needed for nausea 180 tablet 3    spironolactone (ALDACTONE) 50 MG tablet Take 1 tablet (50 mg) by mouth daily at 2 pm. 90 tablet 3    tiZANidine (ZANAFLEX) 4 MG tablet Take 1 tablet (4 mg) by mouth at bedtime. (Patient taking differently: Take 4 mg by mouth nightly as needed for muscle spasms.) 90 tablet 3    Turmeric 500 MG CAPS Take 500 mg by mouth every morning         Allergies   Allergen Reactions    Erythromycin Nausea    Penicillins Hives     Around age 6 - doesn't recall full reaction, mother told her it was hives.     Has tolerated cephalsporins.        Family History   Problem Relation Age of Onset    Neurologic Disorder Mother         Anuerysm of Cerebral Artery, Dementia    Diabetes Mother     Thyroid Disease Mother         Hyperthyroidism (goiter)    Cerebrovascular Disease Mother         Hemorrhagic    Dementia Mother     Osteoporosis Mother     Hyperlipidemia Mother     Heart Disease Father         AAA    Hypertension Father     Coronary Artery Disease Father     Hyperlipidemia Father     Mental Illness Father         Schizophrenia?    Dementia Brother          hydrocephalis    Circulatory Brother         Perihperal Neurophathy    Peripheral Neuropathy Brother     Peripheral Neuropathy Brother     Diabetes Maternal Grandmother         Presumed Type 2    Asthma Maternal Grandmother     Chronic Obstructive Pulmonary Disease Maternal Grandfather         father    Asthma Maternal Grandfather     Diabetes Maternal Aunt         x2    Melanoma Maternal Aunt     Glaucoma Maternal Aunt     Breast Cancer Cousin     Breast Cancer Cousin     Dementia Other     Cancer Other         malignant melanoma    Hypertension Other     Hypertension Other     Cerebrovascular Disease Other     Cerebrovascular Disease Other     Obesity Other     Respiratory Other     Cancer Other     Diabetes Other     Asthma Other     Other Cancer Other         Malignant melanoma    Obesity Other     Macular Degeneration No family hx of     Kidney Disease No family hx of     Thrombosis No family hx of     Arthritis No family hx of     Depression No family hx of     Substance Abuse No family hx of     Cystic Fibrosis No family hx of     Early Death No family hx of     Coronary Artery Disease Early Onset No family hx of     Heart Failure No family hx of     Bleeding Diathesis No family hx of     Ovarian Cancer No family hx of     Uterine Cancer No family hx of     Prostate Cancer No family hx of     Colorectal Cancer No family hx of     Pancreatic Cancer No family hx of     Lung Cancer No family hx of     Autoimmune Disease No family hx of     Unknown/Adopted No family hx of     Genetic Disorder No family hx of     Bleeding Disorder No family hx of     Clotting Disorder No family hx of     Anesthesia Reaction No family hx of        Additional medical/Social/Surgical histories reviewed in [facility] and updated as appropriate.       PHYSICAL EXAM  LMP  (LMP Unknown)     General  - normal appearance, in no obvious distress  Musculoskeletal - right foot  - stance: Mildly antalgic gait  - inspection: Swelling of right fourth  toe, mild erythema and ecchymosis diffusely right fourth toe.  - palpation: Diffuse fourth proximal, middle and distal phalanx tenderness of fourth toe.  - ROM: Decreased ROM of fourth toe.  - strength:Grossly intact  Neuro  - no sensory or motor deficit, grossly normal coordination, normal muscle tone      IMAGING : XR right foot . Final results and radiologist's interpretation, available in the [facility] health record. Images were reviewed with the patient/family members in the office today. My personal interpretation of the performed imaging is      ASSESSMENT & PLAN  Ms. Perez is a 74 year old year old female hx osteoporosis on Eviniti who presents to clinic today with acute right fourth toe pain after crush injury dropping steel water bottle on her toe yesterday.    XR and exam overall reassuring. No evidence for fracture noted. Discussed possibility that a very small hairline fracture could exist, may show up in 10-14 days.  Regardless of fracture status we will treat symptomatically as if it were a fracture.    Diagnosis: Crush injury of right foot    -Pasquale taping provided today and instructed on daily taping  -Discussed post-op shoe vs. Her shoe with AFO. AFO is quite rigid to forefoot and may use this for symptomatic relief. Continue firm soled shoe/AFO and avoid barefoot walking.  -Icing daily 15-20 minutes every 2-3 hours, elevation  -OTC tylenol PRN up to 3 grams daily (1g TID max)  -Follow up 4 weeks if pain persists, consider repeat xray at that time.    It was a pleasure seeing Nadira today.    Willis Borjas DO, CAQSM  Primary Care Sports Medicine

## 2024-10-23 ENCOUNTER — TELEPHONE (OUTPATIENT)
Dept: PLASTIC SURGERY | Facility: CLINIC | Age: 74
End: 2024-10-23
Payer: COMMERCIAL

## 2024-10-23 PROBLEM — H61.301: Status: ACTIVE | Noted: 2024-10-16

## 2024-10-23 NOTE — TELEPHONE ENCOUNTER
Called patient to schedule surgery with Dr. Agudelo. Patient states she is driving and would like writer to send myhomemove message with surgery scheduler's direct phone number so she can call once she is home.    myhomemove message sent with writer's direct line.    P: [phone]      Milagro Colon on 10/23/2024 at 2:10 PM

## 2024-10-23 NOTE — TELEPHONE ENCOUNTER
Received call to schedule surgery with: Dr. Agudelo    Spoke with: Ismael     Date of Surgery: 3/4    Estimated Arrival time Discussed with Patient:  No    Location of surgery: [facility]    Pre-operative H&P: patient confirmed they will schedule with primary care clinic    Pre-surgical Consult: Yes 2/12 at 3:00 PM    Additional Appointments: N/A    Post-operative Appointment w/TOBI or Surgeon: Dr. Agudelo 3/12 at 3:00 PM    Discussed with patient pre-op RN will call 2-3 days prior to surgery with arrival time and instructions:  Yes     Standard Surgery Packet: Yes to be sent via US mail    Additional Comments: N/A    All patients questions were answered and was instructed to contact the clinic with any questions or concerns.       Milagro Colon on 10/23/2024 at 3:59 PM

## 2024-10-25 ENCOUNTER — THERAPY VISIT (OUTPATIENT)
Dept: PHYSICAL THERAPY | Facility: CLINIC | Age: 74
End: 2024-10-25
Payer: COMMERCIAL

## 2024-10-25 DIAGNOSIS — R42 DIZZINESS: ICD-10-CM

## 2024-10-25 DIAGNOSIS — M21.371 RIGHT FOOT DROP: ICD-10-CM

## 2024-10-25 DIAGNOSIS — R29.898 RIGHT LEG WEAKNESS: Primary | ICD-10-CM

## 2024-10-25 DIAGNOSIS — R26.89 BALANCE PROBLEM: ICD-10-CM

## 2024-10-25 PROCEDURE — 97164 PT RE-EVAL EST PLAN CARE: CPT | Mod: GP

## 2024-10-25 PROCEDURE — 97112 NEUROMUSCULAR REEDUCATION: CPT | Mod: GP

## 2024-10-25 NOTE — PROGRESS NOTES
PHYSICAL THERAPY EVALUATION  Type of Visit: Evaluation        Fall Risk Screen:  Fall screen completed by: PT  Have you fallen 2 or more times in the past year?: Yes  Have you fallen and had an injury in the past year?: Yes  Is patient a fall risk?: Yes  Fall screen comments: Pt at inc risk for falling 2' orthopedic injuries, vestibular impairment, and known imbalance.    Subjective       Presenting condition or subjective complaint:    Portion's of today's re-evaluation taken from initial evaluation document on 9/30/2024. Transitioning to this PT for assessment of dizziness and imbalance s/p history of vestibular impairments. See further detail in subjective in flowsheet.     Date of onset: 09/06/24 (order date due to chronicity of problem)    Relevant medical history:     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    Thyroid disease      Dates & types of surgery:    Past Surgical History:   Procedure Laterality Date    ABDOMEN SURGERY      ovarian cyst, mesh    ARTHRODESIS WRIST Right     ARTHRODESIS WRIST  02/14/2013    Procedure: ARTHRODESIS WRIST;  left wrist scaphoid excision, four bone fusion, iliac crest bone graft  ( Mac with block);  Surgeon: Av Mendez MD;  [facility]    BIOPSY      skin, vaginal    BLEPHAROPLASTY BILATERAL Bilateral 05/06/2022    Procedure: UPPER BLEPHAROPLASTY, BILATERAL;  Surgeon: Jemal Sanchez MD;  [place] [facility] [place]    CATARACT IOL, RT/LT Right 03/13/2018    CATARACT IOL, RT/LT Left 02/20/2018     COLONOSCOPY  12/24/2013    Procedure: COMBINED COLONOSCOPY, SINGLE BIOPSY/POLYPECTOMY BY BIOPSY;  COLONOSCOPY;  Surgeon: Dom Alvarez MD;  Location:  GI    COLONOSCOPY N/A 3/12/2024    Procedure: COLONOSCOPY, FLEXIBLE, WITH LESION REMOVAL USING SNARE;  Surgeon: Amina Espinoza MD;  Location: [facility]    COSMETIC BLEPHAROPLASTY LOWER LIDS BILATERAL Bilateral 05/06/2022    Procedure: BLEPHAROPLASTY, LOWER EYELID, BILATERAL, COSMETIC;  Surgeon: Jemal Sanchez MD;  Location: [facility] [place]    COSMETIC SURGERY      ESOPHAGOSCOPY, GASTROSCOPY, DUODENOSCOPY (EGD), COMBINED N/A 11/23/2016    Procedure: COMBINED ESOPHAGOSCOPY, GASTROSCOPY, DUODENOSCOPY (EGD);  Surgeon: Quinten Feliciano MD;  Location:  GI    ESOPHAGOSCOPY, GASTROSCOPY, DUODENOSCOPY (EGD), COMBINED N/A 3/12/2024    Procedure: ESOPHAGOGASTRODUODENOSCOPY, WITH BIOPSY;  Surgeon: Amina Espinoza MD;  Location: [facility]    EXTERNAL EAR SURGERY      right    EYE SURGERY      radial keratomy    FUSION, SPINE, INTERBODY, OBLIQUE ANTERIOR AND LUMBAR, 2 LEVELS, POST APPROACH, USING OTS N/A 11/18/2021    Procedure: Part 1: Oblique Anterior Interbody Fusion at Lumbar 5 to sacral 1 with use of Bone Morphogenic Protein,;  Surgeon: Serge Ramirez MD;  Location:  OR    GI SURGERY      Umbilical hernia repair    GRAFT BONE FROM ILIAC CREST  02/14/2013    Procedure: GRAFT BONE FROM ILIAC CREST;  mac with block and local infilitration;  Surgeon: Av Mendez MD;  Location:  OR     BREATH HYDROGEN TEST N/A 10/14/2016    Procedure: HYDROGEN BREATH TEST;  Surgeon: Cheri Barron MD;  Location:  GI    HERNIA REPAIR      umbilical age 18 mos.    HYSTERECTOMY TOTAL ABDOMINAL  05/03/2000    INJECT EPIDURAL CAUDAL N/A 09/12/2023    Procedure: Caudal epidural steroid injection with fluoroscopy;  Surgeon: Natasha Umaña MD;  Location: [facility] [place]    INJECT EPIDURAL CAUDAL N/A 1/2/2024    Procedure: INJECTION, EPIDURAL, CAUDAL;  Surgeon: Chasidy  MD Natasha;  Location: [facility] OR    INJECT EPIDURAL LUMBAR N/A 05/23/2023    Procedure: L3-4 interlaminar epidural steroid injection with fluoroscopy ( left> right);  Surgeon: Natasha Umaña MD;  Location: [facility] OR    INJECT JOINT SACROILIAC Left 04/27/2023    Procedure: Left sacroiliac joint steroid injection with fluoroscopy;  Surgeon: Natasha Umaña MD;  Location: [facility] OR    INJECT JOINT SACROILIAC Bilateral 10/2/2024    Procedure: Bilateral sacroiliac joint injection;  Surgeon: Thais Saldivar MD;  Location: [facility] OR    MASTECTOMY MODIFIED RADICAL Bilateral     bilateral; right breast prophylactic    OPTICAL TRACKING SYSTEM FUSION POSTERIOR SPINE LUMBAR N/A 11/18/2021    Procedure: Open Posterior Instrumented Spinal Fusion at Lumbar 5 to Sacral 1, with grimm aguayo osteotomy, use of O-Arm/Stealth;  Surgeon: Serge Ramirez MD;  Location: [facility]    OTOPLASTY Bilateral 7/25/2024    Procedure: Right Revision Functional Otoplasty;  Surgeon: Juan Agudelo MD;  Location: [facility] OR    PARATHYROIDECTOMY  09/23/2004    R SUPERIOR    PARATHYROIDECTOMY  09/23/2004    parathyroid resection, subtotal    RELEASE CARPAL TUNNEL Right 12/02/2021    Procedure: RIGHT CARPAL TUNNEL RELEASE, RIGHT WRIST HARDWARE REMOVAL, RIGHT CARPAL BOSS EXCISION;  Surgeon: Rolan Conley MD;  Location: [facility] OR    REMOVE HARDWARE HAND  09/24/2013    Procedure: REMOVE HARDWARE HAND;  Left Hand Screw Removal       RHINOPLASTY  1968    thyr proc skin closed cosmetic manner by subcuticular stitch  01/23/2009    THYROPLASTY  10/09/2009    TONSILLECTOMY  1977    VITRECTOMY PARSPLANA WITH 25 GAUGE SYSTEM Left 06/20/2022    Procedure: LEFT EYE VITRECTOMY, PARS PLANA APPROACH, USING 25-GAUGE INSTRUMENTS, laser;  Surgeon: Felix Carlos MD;  [place] [facility] [place]    WRIST SURGERY      wrist arthrodesis     Prior diagnostic imaging/testing results:       VNG Testing Results in 3/11/2020  ASSESSMENT:  1. There were no indications of  central vestibular system involvement noted on today's exam.      2. There were no indications of peripheral vestibular system involvement noted on today's exam.     Prior therapy history for the same diagnosis, illness or injury:    She has done balance exercises in the past & vestibular rehabilitation program. She has stopped completing them at home (>1 year).     Prior Level of Function  Transfers: Independent, Assistive equipment  Ambulation: Independent, Assistive equipment  ADL: Independent, Assistive equipment  IADL:  Mostly IND.    Living Environment  Social support: Alone   Type of home: [facility]   Stairs to enter the home: No       Ramp: No   Stairs inside the home: Yes 13 Is there a railing: Yes     Help at home: Home management tasks (cooking, cleaning); Home and Yard maintenance tasks  Equipment owned: Straight Cane; Four-point cane; Walker with wheels; Standard wheelchair; Bath bench      Employment: Yes Part time nurse consultant (self-employed)  Hobbies/Interests: Reading, old movies, some computer games, volunteering     Patient goals for therapy: Obtain most of my dominant hand use as possible. Continue exercising, as pt does pilates reformer 1-2 per week, exercise bike x 30 min x 2 per week, calf stretch, heel raises; has therabands but does not do consistently.     Objective  COGNITIVE STATUS EXAMINATION  Orientation: Oriented to person, place and time   Level of Consciousness: Alert  Follows Commands and Answers Questions: 100% of the time  Personal Safety and Judgement: Intact  Memory: Intact     OBSERVATION: 4ww in community or SEC; nothing at home- short distances.  INTEGUMENTARY: Intact  POSTURE: Forward head/rounded shoulders in seated.  PALPATION: N/A today.  RANGE OF MOTION:  B ankle stiffness noted into dorsiflexion R>L  STRENGTH:   Pain: - none + mild ++ moderate +++ severe  Strength Scale: 0-5/5 Left Right   Hip Flexion 4+ 5-   Hip Extension       Hip Abduction       Hip Adduction        Hip Internal Rotation       Hip External Rotation 3+ pain 4   Knee Flexion       Knee Extension 5 5   Ankle DF: R 3-/5 L 5/5     BED MOBILITY: Reporting IND and no dizziness.     TRANSFERS: Independent widen OLGA with STS      WHEELCHAIR MOBILITY: n/a     GAIT:   Level of Ezel: Independent  Assistive Device(s):  short distance without assistive device, longer distances with 4ww or SEC. Pt brought 4ww to session today  Gait Deviations: Base of support increased  Stance time decreased  Stride length decreased  Ariane decreased  AFO on R LE due to foot drop  Gait Distance: 30 ft  Stairs: not assessed    SPECIAL TESTS  10 Meter Walk Test (Comfortable)  Without assistive device: 1.02 m/s; with 4ww: 0.89 m/s   5 Times Sit-to-Stand (5TSTS)  20.29 sec, standard height      Modified CTSIB Conditions (sec) Cond 1: 30 sec  Cond 2: 29 sec  Cond 4: 30 sec  Cond 5 : 1 sec      SENSATION:  vince neuropathy in feet     VESTIBULAR RE-EVALUATION  Subjective Report: Pt reporting a long history of vestibular impairment. Had VNG testing done in 2020. Did not find central or peripheral vestibular impairment; however, pt continues to have balance challenges and dizziness symptoms with head movement. She had previously done PT for movement habituation and vestibular adaptation; however, stopped this a few months ago. She'd like to resume an HEP to address remaining dizziness and imbalance symptoms.       Oculomotor ROM: Normal  Smooth Pursuit: Normal  Saccades: Normal  VOR: Normal  VOR Comments: Pt symptomatic with head movement.  VOR Cancellation: Normal  VOR Cancellation Comments: Pt symptomatic 2' motion sensitivity with background blurring behind her.  Convergence Testing: Normal      Did not complete goggle testing today 2' time constraint. Will perform at an upcoming session.     Assessment & Plan   CLINICAL IMPRESSIONS  Medical Diagnosis: Right leg weakness; Right foot drop; dizziness    Treatment Diagnosis: Right leg  weakness  Right foot drop, impairment of balance with household/community activities; dizziness   Impression/Assessment: Patient is a 74 year old female with dizziness, weakness, and imbalance complaints.  The following significant findings have been identified: Pain, Decreased ROM/flexibility, Decreased strength, Impaired balance, Decreased proprioception, Impaired sensation, Impaired gait, Impaired muscle performance, Decreased activity tolerance, Impaired posture, Instability, and Dizziness. These impairments interfere with their ability to perform household mobility and community mobility as compared to previous level of function.     Clinical Decision Making (Complexity):  Clinical Presentation: Evolving/Changing  Clinical Presentation Rationale: based on medical and personal factors listed in PT evaluation  Clinical Decision Making (Complexity): Moderate complexity    PLAN OF CARE  Treatment Interventions:  Interventions: Gait Training, Manual Therapy, Neuromuscular Re-education, Therapeutic Activity, Therapeutic Exercise, Self-Care/Home Management, Canalith Repositioning (if BPPV identified)    Long Term Goals     PT Goal 1  Goal Identifier: HEP  Goal Description: Pt will demonstrate IND with strength, balance, and muscular and aerobic endurance HEP, while working to improve capacity for functional mobility.  Target Date: 12/23/24  PT Goal 2  Goal Identifier: 5xSTS  Goal Description: Pt will complete x5 STS in <12s without use of B UEs, to demonstrate increased B LE strength and reduced risk for falling.  Target Date: 12/23/24  PT Goal 3  Goal Identifier: FGA  Goal Description: Pt will score >22/30pts on FGA, to demonstrate improved dynamic balance and postural control with ambulation and decreased risk for falling with functional mobility.  Target Date: 12/23/24  PT Goal 4  Goal Identifier: 10 MWT  Goal Description: Patient will achieve MDC for self-selected gait speeds to show improved gait efficiency,  gurinder, and mechanics to reduce risk for falling.  Goal Progress: baseline: comf=1.02m/s,  at inc risk for falling.  Target Date: 12/23/24  PT Goal 5  Goal Identifier: Dizziness  Goal Description: Pt will report dizziness of 0-2/10 with her typical daily activities, demonstrating habituation of her vestibular symptoms.  Target Date: 12/23/24      Frequency of Treatment: 1x/week  Duration of Treatment: 60 days    Recommended Referrals to Other Professionals:  None at this time.     Education Assessment:   Learner/Method: Patient;Listening;Demonstration;Pictures/Video;Reading;No Barriers to Learning    Risks and benefits of evaluation/treatment have been explained.   Patient/Family/caregiver agrees with Plan of Care.     Evaluation Time:           Signing Clinician: Sari Ruiz PT, DPT, NCS        [facility]                                                                                   OUTPATIENT PHYSICAL THERAPY      PLAN OF TREATMENT FOR OUTPATIENT REHABILITATION   Patient's Last Name, First Name, MARCELINOAUGUSTIN  ChrisNadira YOB: 1950   Provider's Name   [facility]   Medical Record No.  [id number]     Onset Date: 09/06/24 (order date due to chronicity of problem)  Start of Care Date: 10/25/24     Medical Diagnosis:  Right leg weakness; Right foot drop; dizziness      PT Treatment Diagnosis:  Right leg weakness  Right foot drop, impairment of balance with household/community activities; dizziness Plan of Treatment  Frequency/Duration: 1x/week/ 60 days    Certification date from 10/25/24 to 12/23/24         See note for plan of treatment details and functional goals     Sari Ruiz PT, DPT, NCS                          I CERTIFY THE NEED FOR THESE SERVICES FURNISHED UNDER        THIS PLAN OF TREATMENT AND WHILE UNDER MY CARE     (Physician attestation of this document indicates review and certification of the therapy plan).               Referring Provider:  Renée Harris MD    Certifying Provider (PCP):   Matilde Quiñonez APRN CNP; PT 10/25/2024 - 12/23/2024    Initial Assessment  See Epic Evaluation- Start of Care Date: 10/25/24

## 2024-10-30 ENCOUNTER — ALLIED HEALTH/NURSE VISIT (OUTPATIENT)
Dept: INTERNAL MEDICINE | Facility: CLINIC | Age: 74
End: 2024-10-30
Payer: COMMERCIAL

## 2024-10-30 DIAGNOSIS — H61.23 BILATERAL IMPACTED CERUMEN: ICD-10-CM

## 2024-10-30 DIAGNOSIS — Z23 NEED FOR VACCINATION: Primary | ICD-10-CM

## 2024-10-30 PROCEDURE — 69209 REMOVE IMPACTED EAR WAX UNI: CPT | Mod: 50

## 2024-10-30 PROCEDURE — 90480 ADMN SARSCOV2 VAC 1/ONLY CMP: CPT

## 2024-10-30 PROCEDURE — 91320 SARSCV2 VAC 30MCG TRS-SUC IM: CPT

## 2024-10-30 PROCEDURE — 90662 IIV NO PRSV INCREASED AG IM: CPT

## 2024-10-30 PROCEDURE — G0008 ADMIN INFLUENZA VIRUS VAC: HCPCS

## 2024-10-30 NOTE — PROGRESS NOTES
Nadira Perez received the Covid and Influenza vaccines today in clinic at the request of MERCEDES Montes De Oca CNP. The immunization was given under the supervision of Dr. Emmanuel if assistance was needed. The patient does not report a history of adverse reactions associated with vaccine administration. The immunization site was cleaned with an alcohol prep wipe. The immunization was given without incident--see immunization list for administration details. No swelling or redness was observed at the site of injection after the immunization was given. The patient was advised to remain in fourth floor lobby of the [facility] for fifteen minutes after the injection in case of an adverse reaction.     [facility]ad[facility] at the request of bilateral for an ear wash. The patient's ears were assessed for cerumen. After this, an ear wash was performed in which a large amount of impacted cerumen was removed from both ear/s; specifically, 750 mL of H2O/trace H2O2 were used to lavage the patient's left ear and 750 mL of H2O/trace H2O2 were used to lavage the patient's right ear. After the ear wash, the tympanic membrane was visible. The ear wash was provided today under the supervision of Dr. Emmanuel who was present if help was needed.    Rosie Sheriff, EMT at 1:50 PM on 10/30/2024

## 2024-10-31 ENCOUNTER — TELEPHONE (OUTPATIENT)
Dept: SLEEP MEDICINE | Facility: CLINIC | Age: 74
End: 2024-10-31

## 2024-10-31 NOTE — PROGRESS NOTES
CANCER SURVIVORSHIP VISIT  Nov 7, 2024    Care Team   Primary Care Provider Matilde Quiñonez   Surgeon  Mauro Mei MD   Radiation Oncologist Nghia Burch MD   Medical Oncologist  Martha William MD, Khushbu Louise MD       REASON FOR VISIT: survivorship visit for history of breast cancer    CANCER HISTORY AND TREATMENT:    History of left-sided stage IIIC inflammatory breast cancer, ER/AK positive, HER2 negative, diagnosed in 2007.  *Diagnosis: 6/13/2007. 57 years old. Presented with left-sided breast discomfort which extended into the axilla. Clinical stage IIIC (T4d N3 M0), infiltrating ductal carcinoma, ER/AK positive, HER2-oneida negative. She had skin and areolar complex thickening consistent with inflammatory breast cancer.   *Neoadjuvant chemotherapy: She received FEC (Fluorouracil, Epirubicin, Cyclosphamide) for 3 cycles through 08/24/2007. This was followed by Taxotere for 3 cycles completed 10/26/2007. Estimated cumulative epirubicin dosing was 300 mg/m2 (900 mg/m2 is cumulative lifetime dose).   *Surgery: 11/20/2007 left-sided mastectomy with axillary lymph node dissection. 1 cm residual carcinoma with DCIS. 13/33 lymph nodes were positive, largest was 0.6 cm with extracapsular extension.   *Radiation: She received radiation to the left chest wall at a dose of 6440 cGy, to the left supraclavicular fossa at a dose of 5040 cGy, and to the left internal mammary chain at a dose of 5080 cGy.  Last dose of radiation was administered on 03/07/2008.         *Endocrine therapy: Arimidex 11//2007-11/2012, Tamoxifen 11/2012-11/2017, Arimidex 11/2017-11/2021. 14 years of endocrine therapy.   *Genetic testing: no  *Other information: s/p right sided prophylactic mastectomy 11/20/2007. Zometa every 6 months from 11/2017-11/2021 (4 years).     Cardiovascular risk screen:  Age >60 at cancer treatment: no   History of EF<55%: yes 2023 stress cardiomyopathy  History of MI: yes  Anthracycline: yes    Radiotherapy to the left chest: yes  History of trastuzumab: no  Smoking history: no  Hypertension: yes  Hyperlididemia: yes  Diabetes: no  Obesity: no    INTERVAL HISTORY:     Ismael presents for a cancer survivorship follow up visit.     Changes in past year:    1) Last February tripped and fractured her sternum. Had stress cardiomyopathy with LVEF down to 35% on the day of mechanical fall 2/16/2024, now improved to LVEF 50 to 55%. Now follows with Dr. Rascon and Ismael is pleased with her care.   2) Lightheadedness new in the last year related to head movement. Saw audiology for testing. Going to balance therapy for this- just started. No other new neuro sx. We discussed that if this doesn't improve, I have a low threshold for evaluating with a brain MRI. Last brain MRI was 2020- chronic small vessel ischemic disease. Doesn't seem to coordinate with hypotension.     Chronic issues  -Planning to go back to lymphedema PT and would also like to work on ROS on the left but above issues have taken priority. Sees Dr. Centeno in PM&M for neuropathy and rehab needs.   -Continues to have chronic low back pain and had back surgery a few years back. She had a CT of her lumbar spine 5/2022 negative for metastatic disease. This issue affects her QOL. She has had good relief with bilateral SI join injections with Dr. Saldivar in pain clinic.   -She has an hiatal hernia and sx are controlled on a PPI.     No new lumps/bumps or new pain.     Mood: no concerns, she likes to stay busy and reports she is a positive person.     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      Ascorbic Acid (VITAMIN C) 500 MG CHEW Take 1 tablet by mouth every morning      atorvastatin (LIPITOR) 80 MG tablet Take 1 tablet (80 mg) by mouth daily. 90 tablet 3    carvedilol (COREG) 12.5 MG tablet Take 1 tablet (12.5 mg) by mouth 2 times daily (with meals). 180 tablet 3    CRANBERRY EXTRACT PO Take 500 mg by mouth every morning      diclofenac (VOLTAREN) 1 % topical gel Apply 2 g topically 4 times daily 100 g 6    gabapentin (NEURONTIN) 300 MG capsule Take 4 capsules (1,200 mg) by mouth 3 times daily. 1080 capsule 3    HEMP OIL OR EXTRACT OR OTHER CBD CANNABINOID, NOT MEDICAL CANNABIS, THC      hydrochlorothiazide (HYDRODIURIL) 25 MG tablet Take 1 tablet (25 mg) by mouth daily. 90 day supply denied 30 tablet 3    levothyroxine (SYNTHROID/LEVOTHROID) 112 MCG tablet TAKE 1/2 TAB BY MOUTH EVERY DAY 45 tablet 3    lisinopril (ZESTRIL) 20 MG tablet Take 1 tablet (20 mg) by mouth 2 times daily. 180 tablet 3    MAGNESIUM GLYCINATE PO Take 400 mg by mouth at bedtime.      Melatonin 10 MG TABS tablet Take 10 mg by mouth nightly as needed.      Multiple Vitamin (MULTI-VITAMIN) per tablet Take 1 tablet by mouth every morning      omeprazole (PRILOSEC) 20 MG DR capsule Take 1 capsule (20 mg) by mouth daily. 90 capsule 3    ondansetron (ZOFRAN) 4 MG tablet Take 1 tablet (4 mg) by mouth every 6 hours as needed for nausea. 12 tablet 3    ondansetron (ZOFRAN-ODT) 4 MG ODT tab Take 1 tablet (4 mg) by mouth every 12 hours as needed for nausea 180 tablet 3    spironolactone (ALDACTONE) 50 MG tablet Take  1 tablet (50 mg) by mouth daily at 2 pm. 90 tablet 3    tiZANidine (ZANAFLEX) 4 MG tablet Take 1 tablet (4 mg) by mouth at bedtime. (Patient taking differently: Take 4 mg by mouth nightly as needed for muscle spasms.) 90 tablet 3    Turmeric 500 MG CAPS Take 500 mg by mouth every morning            Allergies   Allergen Reactions    Erythromycin Nausea    Penicillins Hives     Around age 6 - doesn't recall full reaction, mother told her it was hives.     Has tolerated cephalsporins.        Family History   Problem Relation Age of Onset    Neurologic Disorder Mother         Anuerysm of Cerebral Artery, Dementia    Diabetes Mother     Thyroid Disease Mother         Hyperthyroidism (goiter)    Cerebrovascular Disease Mother         Hemorrhagic    Dementia Mother     Osteoporosis Mother     Hyperlipidemia Mother     Heart Disease Father         AAA    Hypertension Father     Coronary Artery Disease Father     Hyperlipidemia Father     Mental Illness Father         Schizophrenia?    Dementia Brother         hydrocephalis    Circulatory Brother         Perihperal Neurophathy    Peripheral Neuropathy Brother     Peripheral Neuropathy Brother     Diabetes Maternal Grandmother         Presumed Type 2    Asthma Maternal Grandmother     Chronic Obstructive Pulmonary Disease Maternal Grandfather         father    Asthma Maternal Grandfather     Diabetes Maternal Aunt         x2    Melanoma Maternal Aunt     Glaucoma Maternal Aunt     Breast Cancer Cousin     Breast Cancer Cousin     Dementia Other     Cancer Other         malignant melanoma    Hypertension Other     Hypertension Other     Cerebrovascular Disease Other     Cerebrovascular Disease Other     Obesity Other     Respiratory Other     Cancer Other     Diabetes Other     Asthma Other     Other Cancer Other         Malignant melanoma    Obesity Other     Macular Degeneration No family hx of     Kidney Disease No family hx of     Thrombosis No family hx of     Arthritis  No family hx of     Depression No family hx of     Substance Abuse No family hx of     Cystic Fibrosis No family hx of     Early Death No family hx of     Coronary Artery Disease Early Onset No family hx of     Heart Failure No family hx of     Bleeding Diathesis No family hx of     Ovarian Cancer No family hx of     Uterine Cancer No family hx of     Prostate Cancer No family hx of     Colorectal Cancer No family hx of     Pancreatic Cancer No family hx of     Lung Cancer No family hx of     Autoimmune Disease No family hx of     Unknown/Adopted No family hx of     Genetic Disorder No family hx of     Bleeding Disorder No family hx of     Clotting Disorder No family hx of     Anesthesia Reaction No family hx of         Social history:  Living situation: , lives in [place].   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  Tobacco use:   Tobacco Use      Smoking status: Never        Passive exposure: Never      Smokeless tobacco: Never  Uses marijuana (water filtered) for pain control  ETOH use: 0-3 drinks a week  Exercise: Getting 150 min of exercise a week (PT, pilates, bike)  Diet: Healthy    Physical exam   /75 (BP Location: Right arm, Patient Position: Sitting, Cuff Size: Adult Regular)   Pulse 83   Temp 99.2  F (37.3  C) (Oral)   Resp 12   Wt 79.2 kg (174 lb 11.2 oz)   LMP  (LMP Unknown)   SpO2 97%   BMI 29.07 kg/m    Wt Readings from Last 4 Encounters:   10/15/24 78.9 kg (174 lb)   10/02/24 79 kg (174 lb 3.2 oz)   09/10/24 79.7 kg (175 lb 11.2 oz)   09/06/24 79.7 kg (175 lb 11.2 oz)   General: No acute distress.    HEENT: Sclera anicteric.   Lymph: No lymphadenopathy in neck, supraclavicular, and axillary areas  Heart: RRR. Occasional extra beat  Lungs: Clear to ascultation bilaterally  Chest: Declined exam today- recently had a chest wall exam with Matilde  Extremities: Wears brace on right lower leg  with 1+ edema bilaterally (improved now that she is off amlodipine)  Neuro: Cranial nerves grossly intact    IMPRESSION/PLAN:    History of left-sided stage IIIC inflammatory breast cancer, ER/CA positive, HER2 negative, diagnosed in 2007.  Treated with neoadjuvant chemo, bilateral mastectomy (residual disease), radiation, and 14 years of endocrine therapy.   17 years out without evidence of recurrence, discussed that risk of ER+ recurrence persists.  No routine surveillance scans or tumor markers indicated but I have a low threshold to evaluate new complaints.   Annual cancer survivorship visit until she is 20 years out then possibly prn. Ok to have virtual visits since Matilde is performing an annual physical exam including chest wall.     Dizziness and imbalance s/p history of vestibular impairments. Had VNG testing and brain MRI done in 2020. Did not find central or peripheral vestibular impairment; however, pt continues to have balance challenges and dizziness symptoms with head movement. She recently started PT for this. If it doesn't improve, I have a low thresh hold to evaluate with brain MRI given her breast cancer history.     Known late effects:  Lymphedema: Ongoing issue. She would like a referral back to PT.   Peripheral neuropathy: precedes cancer diagnosis. She sees Dr. 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 LCx infarction   Follows with Dr. Rascon in cardio-oncology. She is on a statin and is on antihypertensives   Osteoporosis s/p history of multiple fractures. Last DEXA 05/2023 with a lowest T-score of -1.3, Recommend weightbearing exercises 2-3 days per week, and to supplement with 1200 mg of calcium, 1000 international units of vitamin D daily. S/p fosamax and 4 years of IV bisphosphonate. She reports a history of Prolia and is now on Evenity  managed by Dr. Clark in sports medicine.   GSM. Discussed vaginal moisturizers today     Potential late effects related to chemotherapy:  -Increased risk of secondary malignancy. Routine cancer screening and report new/persistent symptoms.    Potential late effects related to radiation:  - Possible radiation side effects include cardiotoxicity, lung injury, fracture, and second malignancies. She should seek medical attention if she notices any new skin lesions in the area of radiation. If she should develop any shortness of breath, hemoptysis, cough, or new pain, I would advise further evaluation with CT or X-ray.     General late effects screenings and recommendations    -Healthy lifestyle. She should maintain a healthy weight with a BMI between 20-25. She should exercise at least 150 minutes weekly at moderate intensity. She should see the eye doctor every 1-2 years, and dentist every 6 months for cleanings. She should not use any tobacco. She should minimize alcohol intake. If continuing to drink, should follow CDC recommendations for no more than 1 alcoholic drink/day for women.    Non-urgent scheduling should be complete within 7-10 business days. However, if you have not received a phone call from scheduling within 3 business days, you may call to schedule at [phone] option 5, option 2. This is the same number to call to make changes tor if you have questions about the schedule.    Gave resources for our [facility] Cancer Survivorship class series and mailings list for educational opportunities. [email or web address]    Cancer treatment summary was sent electronically to the patient and her PCP.      The total time of this encounter amounted to 50 minutes.This time included time spent with the patient, prep work, and performing post visit documentation.    Ashley Perry MPAS, PAELSA  [facility] Cancer Survivorship [facility]

## 2024-10-31 NOTE — TELEPHONE ENCOUNTER
Reason for Call:  Other appointment    Detailed comments: PT HAS APPT ON 5/13/25 FOR A 3 MONTH FOLLOW UP PER MEDICARE CPAP CHECK PT STARTED ON CPAP 10/25/24 NEED TO BE 3 MONTH OUT SO NEED APPT 1/25/25 PLEASE CALL AND ADVISE PT FOR A SOONER APPT     Phone Number Patient can be reached at: Cell number on file:    Telephone Information:   Mobile [phone]       Best Time: ANYTIME    Can we leave a detailed message on this number? YES    Call taken on 10/31/2024 at 4:24 PM by Santiago Ivan

## 2024-11-01 ENCOUNTER — THERAPY VISIT (OUTPATIENT)
Dept: PHYSICAL THERAPY | Facility: CLINIC | Age: 74
End: 2024-11-01
Payer: COMMERCIAL

## 2024-11-01 DIAGNOSIS — R26.89 BALANCE PROBLEM: ICD-10-CM

## 2024-11-01 DIAGNOSIS — R42 DIZZINESS: Primary | ICD-10-CM

## 2024-11-01 DIAGNOSIS — R53.81 PHYSICAL DECONDITIONING: ICD-10-CM

## 2024-11-01 PROCEDURE — 97112 NEUROMUSCULAR REEDUCATION: CPT | Mod: GP | Performed by: PHYSICAL THERAPIST

## 2024-11-04 ENCOUNTER — THERAPY VISIT (OUTPATIENT)
Dept: OCCUPATIONAL THERAPY | Facility: CLINIC | Age: 74
End: 2024-11-04
Payer: COMMERCIAL

## 2024-11-04 DIAGNOSIS — S62.336D CLOSED DISPLACED FRACTURE OF NECK OF FIFTH METACARPAL BONE OF RIGHT HAND WITH ROUTINE HEALING, SUBSEQUENT ENCOUNTER: ICD-10-CM

## 2024-11-04 DIAGNOSIS — M25.641 STIFFNESS OF FINGER JOINT OF RIGHT HAND: Primary | ICD-10-CM

## 2024-11-04 PROCEDURE — 97110 THERAPEUTIC EXERCISES: CPT | Mod: GO | Performed by: OCCUPATIONAL THERAPIST

## 2024-11-04 PROCEDURE — 97140 MANUAL THERAPY 1/> REGIONS: CPT | Mod: GO | Performed by: OCCUPATIONAL THERAPIST

## 2024-11-05 ENCOUNTER — MYC REFILL (OUTPATIENT)
Dept: INTERNAL MEDICINE | Facility: CLINIC | Age: 74
End: 2024-11-05

## 2024-11-05 DIAGNOSIS — I10 BENIGN ESSENTIAL HYPERTENSION: ICD-10-CM

## 2024-11-06 RX ORDER — HYDROCHLOROTHIAZIDE 25 MG/1
25 TABLET ORAL DAILY
Qty: 90 TABLET | Refills: 3 | Status: SHIPPED | OUTPATIENT
Start: 2024-11-06

## 2024-11-07 ENCOUNTER — ONCOLOGY SURVIVORSHIP VISIT (OUTPATIENT)
Dept: ONCOLOGY | Facility: CLINIC | Age: 74
End: 2024-11-07
Attending: PHYSICIAN ASSISTANT
Payer: COMMERCIAL

## 2024-11-07 VITALS
OXYGEN SATURATION: 97 % | TEMPERATURE: 99.2 F | DIASTOLIC BLOOD PRESSURE: 75 MMHG | BODY MASS INDEX: 29.07 KG/M2 | SYSTOLIC BLOOD PRESSURE: 131 MMHG | HEART RATE: 83 BPM | RESPIRATION RATE: 12 BRPM | WEIGHT: 174.7 LBS

## 2024-11-07 DIAGNOSIS — Z85.3 PERSONAL HISTORY OF MALIGNANT NEOPLASM OF BREAST: Primary | ICD-10-CM

## 2024-11-07 PROCEDURE — 99215 OFFICE O/P EST HI 40 MIN: CPT | Performed by: PHYSICIAN ASSISTANT

## 2024-11-07 PROCEDURE — G0463 HOSPITAL OUTPT CLINIC VISIT: HCPCS | Performed by: PHYSICIAN ASSISTANT

## 2024-11-07 ASSESSMENT — PAIN SCALES - GENERAL: PAINLEVEL_OUTOF10: MODERATE PAIN (4)

## 2024-11-07 NOTE — NURSING NOTE
"Oncology Rooming Note    November 7, 2024 10:50 AM   Nadira Perez is a 74 year old female who presents for:    Chief Complaint   Patient presents with    Oncology Clinic Visit     Infiltrating ductal cancer      Initial Vitals: /75 (BP Location: Right arm, Patient Position: Sitting, Cuff Size: Adult Regular)   Pulse 83   Temp 99.2  F (37.3  C) (Oral)   Resp 12   Wt 79.2 kg (174 lb 11.2 oz)   LMP  (LMP Unknown)   SpO2 97%   BMI 29.07 kg/m   Estimated body mass index is 29.07 kg/m  as calculated from the following:    Height as of 10/16/24: 1.651 m (5' 5\").    Weight as of this encounter: 79.2 kg (174 lb 11.2 oz). Body surface area is 1.91 meters squared.  Moderate Pain (4) Comment: Data Unavailable   No LMP recorded (lmp unknown). Patient has had a hysterectomy.  Allergies reviewed: Yes  Medications reviewed: Yes    Medications: Medication refills not needed today.  Pharmacy name entered into Dasher:    [facility]  [facility] [address] [place] [facility] [id number]  [address]MON[address] DARNELL    Frailty Screening:   Is the patient here for a new oncology consult visit in cancer care? 2. No      Clinical concerns: none      Debbie Workman"

## 2024-11-07 NOTE — LETTER
11/7/2024      Nadira Perez  [id number]  [facility] [address]      Dear Colleague,    Thank you for referring your patient, Nadira Perez, to the [facility]. Please see a copy of my visit note below.      CANCER SURVIVORSHIP VISIT  Nov 7, 2024    Care Team   Primary Care Provider Matilde Quiñonez   Surgeon  Mauro Mei MD   Radiation Oncologist Nghia Burch MD   Medical Oncologist  Martha William MD, Khushbu Louise MD       REASON FOR VISIT: survivorship visit for history of breast cancer    CANCER HISTORY AND TREATMENT:    History of left-sided stage IIIC inflammatory breast cancer, ER/PA positive, HER2 negative, diagnosed in 2007.  *Diagnosis: 6/13/2007. 57 years old. Presented with left-sided breast discomfort which extended into the axilla. Clinical stage IIIC (T4d N3 M0), infiltrating ductal carcinoma, ER/PA positive, HER2-oneida negative. She had skin and areolar complex thickening consistent with inflammatory breast cancer.   *Neoadjuvant chemotherapy: She received FEC (Fluorouracil, Epirubicin, Cyclosphamide) for 3 cycles through 08/24/2007. This was followed by Taxotere for 3 cycles completed 10/26/2007. Estimated cumulative epirubicin dosing was 300 mg/m2 (900 mg/m2 is cumulative lifetime dose).   *Surgery: 11/20/2007 left-sided mastectomy with axillary lymph node dissection. 1 cm residual carcinoma with DCIS. 13/33 lymph nodes were positive, largest was 0.6 cm with extracapsular extension.   *Radiation: She received radiation to the left chest wall at a dose of 6440 cGy, to the left supraclavicular fossa at a dose of 5040 cGy, and to the left internal mammary chain at a dose of 5080 cGy.  Last dose of radiation was administered on 03/07/2008.         *Endocrine therapy: Arimidex 11//2007-11/2012, Tamoxifen 11/2012-11/2017, Arimidex 11/2017-11/2021. 14 years of endocrine therapy.   *Genetic testing: no  *Other information: s/p right sided  prophylactic mastectomy 11/20/2007. Zometa every 6 months from 11/2017-11/2021 (4 years).     Cardiovascular risk screen:  Age >60 at cancer treatment: no   History of EF<55%: yes 2023 stress cardiomyopathy  History of MI: yes  Anthracycline: yes   Radiotherapy to the left chest: yes  History of trastuzumab: no  Smoking history: no  Hypertension: yes  Hyperlididemia: yes  Diabetes: no  Obesity: no    INTERVAL HISTORY:     Imsael presents for a cancer survivorship follow up visit.     Changes in past year:    1) Last February tripped and fractured her sternum. Had stress cardiomyopathy with LVEF down to 35% on the day of mechanical fall 2/16/2024, now improved to LVEF 50 to 55%. Now follows with Dr. Rascon and Ismael is pleased with her care.   2) Lightheadedness new in the last year related to head movement. Saw audiology for testing. Going to balance therapy for this- just started. No other new neuro sx. We discussed that if this doesn't improve, I have a low threshold for evaluating with a brain MRI. Last brain MRI was 2020- chronic small vessel ischemic disease. Doesn't seem to coordinate with hypotension.     Chronic issues  -Planning to go back to lymphedema PT and would also like to work on ROS on the left but above issues have taken priority. Sees Dr. Centeno in PM&M for neuropathy and rehab needs.   -Continues to have chronic low back pain and had back surgery a few years back. She had a CT of her lumbar spine 5/2022 negative for metastatic disease. This issue affects her QOL. She has had good relief with bilateral SI join injections with Dr. Saldivar in pain clinic.   -She has an hiatal hernia and sx are controlled on a PPI.     No new lumps/bumps or new pain.     Mood: no concerns, she likes to stay busy and reports she is a positive person.     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       Ascorbic Acid (VITAMIN C) 500 MG CHEW Take 1 tablet by mouth every morning       atorvastatin (LIPITOR) 80 MG tablet Take 1 tablet (80 mg) by mouth daily. 90 tablet 3     carvedilol (COREG) 12.5 MG tablet Take 1 tablet (12.5 mg) by mouth 2 times daily (with meals). 180 tablet 3     CRANBERRY EXTRACT PO Take 500 mg by mouth every morning       diclofenac (VOLTAREN) 1 % topical gel Apply 2 g topically 4 times daily 100 g 6     gabapentin (NEURONTIN) 300 MG capsule Take 4 capsules (1,200 mg) by mouth 3 times daily. 1080 capsule 3     HEMP OIL OR EXTRACT OR OTHER CBD CANNABINOID, NOT MEDICAL CANNABIS, THC       hydrochlorothiazide (HYDRODIURIL) 25 MG tablet Take 1 tablet (25 mg) by mouth daily. 90 day supply denied 30 tablet 3     levothyroxine (SYNTHROID/LEVOTHROID) 112 MCG tablet TAKE 1/2 TAB BY MOUTH EVERY DAY 45 tablet 3     lisinopril (ZESTRIL) 20 MG tablet Take 1 tablet (20 mg) by mouth 2 times daily. 180 tablet 3     MAGNESIUM GLYCINATE PO Take 400 mg by mouth at bedtime.       Melatonin 10 MG TABS tablet Take 10 mg by mouth nightly as needed.       Multiple Vitamin (MULTI-VITAMIN) per tablet Take 1 tablet by mouth every morning       omeprazole (PRILOSEC) 20 MG DR capsule Take 1 capsule (20 mg) by mouth daily. 90 capsule 3      ondansetron (ZOFRAN) 4 MG tablet Take 1 tablet (4 mg) by mouth every 6 hours as needed for nausea. 12 tablet 3     ondansetron (ZOFRAN-ODT) 4 MG ODT tab Take 1 tablet (4 mg) by mouth every 12 hours as needed for nausea 180 tablet 3     spironolactone (ALDACTONE) 50 MG tablet Take 1 tablet (50 mg) by mouth daily at 2 pm. 90 tablet 3     tiZANidine (ZANAFLEX) 4 MG tablet Take 1 tablet (4 mg) by mouth at bedtime. (Patient taking differently: Take 4 mg by mouth nightly as needed for muscle spasms.) 90 tablet 3     Turmeric 500 MG CAPS Take 500 mg by mouth every morning            Allergies   Allergen Reactions     Erythromycin Nausea     Penicillins Hives     Around age 6 - doesn't recall full reaction, mother told her it was hives.     Has tolerated cephalsporins.        Family History   Problem Relation Age of Onset     Neurologic Disorder Mother         Anuerysm of Cerebral Artery, Dementia     Diabetes Mother      Thyroid Disease Mother         Hyperthyroidism (goiter)     Cerebrovascular Disease Mother         Hemorrhagic     Dementia Mother      Osteoporosis Mother      Hyperlipidemia Mother      Heart Disease Father         AAA     Hypertension Father      Coronary Artery Disease Father      Hyperlipidemia Father      Mental Illness Father         Schizophrenia?     Dementia Brother         hydrocephalis     Circulatory Brother         Perihperal Neurophathy     Peripheral Neuropathy Brother      Peripheral Neuropathy Brother      Diabetes Maternal Grandmother         Presumed Type 2     Asthma Maternal Grandmother      Chronic Obstructive Pulmonary Disease Maternal Grandfather         father     Asthma Maternal Grandfather      Diabetes Maternal Aunt         x2     Melanoma Maternal Aunt      Glaucoma Maternal Aunt      Breast Cancer Cousin      Breast Cancer Cousin      Dementia Other      Cancer Other         malignant melanoma     Hypertension Other      Hypertension Other      Cerebrovascular Disease Other       Cerebrovascular Disease Other      Obesity Other      Respiratory Other      Cancer Other      Diabetes Other      Asthma Other      Other Cancer Other         Malignant melanoma     Obesity Other      Macular Degeneration No family hx of      Kidney Disease No family hx of      Thrombosis No family hx of      Arthritis No family hx of      Depression No family hx of      Substance Abuse No family hx of      Cystic Fibrosis No family hx of      Early Death No family hx of      Coronary Artery Disease Early Onset No family hx of      Heart Failure No family hx of      Bleeding Diathesis No family hx of      Ovarian Cancer No family hx of      Uterine Cancer No family hx of      Prostate Cancer No family hx of      Colorectal Cancer No family hx of      Pancreatic Cancer No family hx of      Lung Cancer No family hx of      Autoimmune Disease No family hx of      Unknown/Adopted No family hx of      Genetic Disorder No family hx of      Bleeding Disorder No family hx of      Clotting Disorder No family hx of      Anesthesia Reaction No family hx of         Social history:  Living situation: , lives in [place].   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  Tobacco use:   Tobacco Use      Smoking status: Never        Passive exposure: Never      Smokeless tobacco: Never  Uses marijuana (water filtered) for pain control  ETOH use: 0-3 drinks a week  Exercise: Getting 150 min of exercise a week (PT, pilates, bike)  Diet: Healthy    Physical exam   /75 (BP Location: Right arm, Patient Position: Sitting, Cuff Size: Adult Regular)   Pulse 83   Temp 99.2  F (37.3  C) (Oral)   Resp 12   Wt 79.2 kg (174 lb 11.2 oz)   LMP  (LMP Unknown)   SpO2 97%   BMI 29.07 kg/m    Wt Readings from Last 4 Encounters:   10/15/24 78.9 kg (174 lb)   10/02/24 79 kg (174 lb 3.2 oz)   09/10/24 79.7 kg (175 lb 11.2 oz)   09/06/24 79.7  kg (175 lb 11.2 oz)   General: No acute distress.    HEENT: Sclera anicteric.   Lymph: No lymphadenopathy in neck, supraclavicular, and axillary areas  Heart: RRR. Occasional extra beat  Lungs: Clear to ascultation bilaterally  Chest: Declined exam today- recently had a chest wall exam with Matilde  Extremities: Wears brace on right lower leg with 1+ edema bilaterally (improved now that she is off amlodipine)  Neuro: Cranial nerves grossly intact    IMPRESSION/PLAN:    History of left-sided stage IIIC inflammatory breast cancer, ER/RI positive, HER2 negative, diagnosed in 2007.  Treated with neoadjuvant chemo, bilateral mastectomy (residual disease), radiation, and 14 years of endocrine therapy.   17 years out without evidence of recurrence, discussed that risk of ER+ recurrence persists.  No routine surveillance scans or tumor markers indicated but I have a low threshold to evaluate new complaints.   Annual cancer survivorship visit until she is 20 years out then possibly prn. Ok to have virtual visits since Matilde is performing an annual physical exam including chest wall.     Dizziness and imbalance s/p history of vestibular impairments. Had VNG testing and brain MRI done in 2020. Did not find central or peripheral vestibular impairment; however, pt continues to have balance challenges and dizziness symptoms with head movement. She recently started PT for this. If it doesn't improve, I have a low thresh hold to evaluate with brain MRI given her breast cancer history.     Known late effects:  Lymphedema: Ongoing issue. She would like a referral back to PT.   Peripheral neuropathy: precedes cancer diagnosis. She sees Dr. 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 LCx infarction   Follows with Dr. Rascon in cardio-oncology. She is on a statin and is on antihypertensives    Osteoporosis s/p history of multiple fractures. Last DEXA 05/2023 with a lowest T-score of -1.3, Recommend weightbearing exercises 2-3 days per week, and to supplement with 1200 mg of calcium, 1000 international units of vitamin D daily. S/p fosamax and 4 years of IV bisphosphonate. She reports a history of Prolia and is now on Evenity managed by Dr. Clark in sports medicine.   GSM. Discussed vaginal moisturizers today     Potential late effects related to chemotherapy:  -Increased risk of secondary malignancy. Routine cancer screening and report new/persistent symptoms.    Potential late effects related to radiation:  - Possible radiation side effects include cardiotoxicity, lung injury, fracture, and second malignancies. She should seek medical attention if she notices any new skin lesions in the area of radiation. If she should develop any shortness of breath, hemoptysis, cough, or new pain, I would advise further evaluation with CT or X-ray.     General late effects screenings and recommendations    -Healthy lifestyle. She should maintain a healthy weight with a BMI between 20-25. She should exercise at least 150 minutes weekly at moderate intensity. She should see the eye doctor every 1-2 years, and dentist every 6 months for cleanings. She should not use any tobacco. She should minimize alcohol intake. If continuing to drink, should follow CDC recommendations for no more than 1 alcoholic drink/day for women.    Non-urgent scheduling should be complete within 7-10 business days. However, if you have not received a phone call from scheduling within 3 business days, you may call to schedule at [phone] option 5, option 2. This is the same number to call to make changes tor if you have questions about the schedule.    Gave resources for our [facility] Cancer Survivorship class series and mailings list for educational opportunities.  [email or web address]    Cancer treatment summary was sent electronically to the patient and her PCP.      The total time of this encounter amounted to 50 minutes.This time included time spent with the patient, prep work, and performing post visit documentation.    MEERA Anaya, SAIDA  [facility] Cancer Survivorship [facility]    Again, thank you for allowing me to participate in the care of your patient.        Sincerely,        Ashley Perry PA-C

## 2024-11-08 ENCOUNTER — THERAPY VISIT (OUTPATIENT)
Dept: PHYSICAL THERAPY | Facility: CLINIC | Age: 74
End: 2024-11-08
Payer: COMMERCIAL

## 2024-11-08 DIAGNOSIS — R29.898 RIGHT LEG WEAKNESS: Primary | ICD-10-CM

## 2024-11-08 DIAGNOSIS — M21.371 RIGHT FOOT DROP: ICD-10-CM

## 2024-11-08 DIAGNOSIS — R42 DIZZINESS: ICD-10-CM

## 2024-11-08 PROCEDURE — 97112 NEUROMUSCULAR REEDUCATION: CPT | Mod: GP

## 2024-11-11 ENCOUNTER — DOCUMENTATION ONLY (OUTPATIENT)
Dept: HOME HEALTH SERVICES | Facility: CLINIC | Age: 74
End: 2024-11-11

## 2024-11-11 NOTE — PROGRESS NOTES
Patient came to [place] for mask fitting appointment on November 11, 2024. Patient requested to switch masks because general discomfort . Discussed the following masks: Resmed Airfit/touch F20; [employer] Tyler Velásquez.     Patient selected a Resmed Mask name: Airtouch F20 Full Face mask size Medium, Standard

## 2024-11-12 ENCOUNTER — THERAPY VISIT (OUTPATIENT)
Dept: PHYSICAL THERAPY | Facility: CLINIC | Age: 74
End: 2024-11-12
Payer: COMMERCIAL

## 2024-11-12 DIAGNOSIS — I89.0 LYMPHEDEMA OF LEFT UPPER EXTREMITY: ICD-10-CM

## 2024-11-12 DIAGNOSIS — R53.1 DECREASED STRENGTH: ICD-10-CM

## 2024-11-12 DIAGNOSIS — R26.89 IMPAIRMENT OF BALANCE: ICD-10-CM

## 2024-11-12 DIAGNOSIS — L90.5 SCAR CONDITION AND FIBROSIS OF SKIN: ICD-10-CM

## 2024-11-12 DIAGNOSIS — I89.0 LYMPHEDEMA OF BOTH LOWER EXTREMITIES: Primary | ICD-10-CM

## 2024-11-12 PROCEDURE — 97110 THERAPEUTIC EXERCISES: CPT | Mod: GP | Performed by: PHYSICAL THERAPIST

## 2024-11-12 PROCEDURE — 97140 MANUAL THERAPY 1/> REGIONS: CPT | Mod: GP | Performed by: PHYSICAL THERAPIST

## 2024-11-13 ENCOUNTER — THERAPY VISIT (OUTPATIENT)
Dept: PHYSICAL THERAPY | Facility: CLINIC | Age: 74
End: 2024-11-13
Payer: COMMERCIAL

## 2024-11-13 DIAGNOSIS — L90.5 SCAR CONDITION AND FIBROSIS OF SKIN: ICD-10-CM

## 2024-11-13 DIAGNOSIS — R26.89 IMPAIRMENT OF BALANCE: Primary | ICD-10-CM

## 2024-11-13 DIAGNOSIS — I89.0 LYMPHEDEMA OF LEFT UPPER EXTREMITY: ICD-10-CM

## 2024-11-13 PROCEDURE — 97110 THERAPEUTIC EXERCISES: CPT | Mod: GP | Performed by: PHYSICAL THERAPIST

## 2024-11-13 PROCEDURE — 97140 MANUAL THERAPY 1/> REGIONS: CPT | Mod: GP | Performed by: PHYSICAL THERAPIST

## 2024-11-14 ENCOUNTER — THERAPY VISIT (OUTPATIENT)
Dept: PHYSICAL THERAPY | Facility: CLINIC | Age: 74
End: 2024-11-14
Payer: COMMERCIAL

## 2024-11-14 DIAGNOSIS — I89.0 LYMPHEDEMA OF LEFT UPPER EXTREMITY: Primary | ICD-10-CM

## 2024-11-14 DIAGNOSIS — I89.0 LYMPHEDEMA OF BOTH LOWER EXTREMITIES: ICD-10-CM

## 2024-11-14 DIAGNOSIS — L90.5 SCAR CONDITION AND FIBROSIS OF SKIN: ICD-10-CM

## 2024-11-14 PROCEDURE — 97140 MANUAL THERAPY 1/> REGIONS: CPT | Mod: GP | Performed by: PHYSICAL THERAPIST

## 2024-11-14 PROCEDURE — 97535 SELF CARE MNGMENT TRAINING: CPT | Mod: GP | Performed by: PHYSICAL THERAPIST

## 2024-11-15 ENCOUNTER — THERAPY VISIT (OUTPATIENT)
Dept: PHYSICAL THERAPY | Facility: CLINIC | Age: 74
End: 2024-11-15
Payer: COMMERCIAL

## 2024-11-15 DIAGNOSIS — R53.1 DECREASED STRENGTH: ICD-10-CM

## 2024-11-15 DIAGNOSIS — I89.0 LYMPHEDEMA OF BOTH LOWER EXTREMITIES: ICD-10-CM

## 2024-11-15 DIAGNOSIS — R26.89 IMPAIRMENT OF BALANCE: Primary | ICD-10-CM

## 2024-11-15 DIAGNOSIS — L90.5 SCAR CONDITION AND FIBROSIS OF SKIN: ICD-10-CM

## 2024-11-15 DIAGNOSIS — I89.0 LYMPHEDEMA OF LEFT UPPER EXTREMITY: ICD-10-CM

## 2024-11-15 PROCEDURE — 97535 SELF CARE MNGMENT TRAINING: CPT | Mod: GP | Performed by: PHYSICAL THERAPIST

## 2024-11-15 PROCEDURE — 97140 MANUAL THERAPY 1/> REGIONS: CPT | Mod: GP | Performed by: PHYSICAL THERAPIST

## 2024-11-17 DIAGNOSIS — I10 ESSENTIAL HYPERTENSION, BENIGN: ICD-10-CM

## 2024-11-18 ENCOUNTER — THERAPY VISIT (OUTPATIENT)
Dept: PHYSICAL THERAPY | Facility: CLINIC | Age: 74
End: 2024-11-18
Payer: COMMERCIAL

## 2024-11-18 DIAGNOSIS — I89.0 LYMPHEDEMA OF LEFT UPPER EXTREMITY: Primary | ICD-10-CM

## 2024-11-18 DIAGNOSIS — I89.0 LYMPHEDEMA OF BOTH LOWER EXTREMITIES: ICD-10-CM

## 2024-11-18 DIAGNOSIS — R26.89 IMPAIRMENT OF BALANCE: ICD-10-CM

## 2024-11-18 DIAGNOSIS — R53.81 PHYSICAL DECONDITIONING: ICD-10-CM

## 2024-11-18 DIAGNOSIS — R26.89 BALANCE PROBLEM: ICD-10-CM

## 2024-11-18 DIAGNOSIS — L90.5 SCAR CONDITION AND FIBROSIS OF SKIN: ICD-10-CM

## 2024-11-18 DIAGNOSIS — R53.1 DECREASED STRENGTH: ICD-10-CM

## 2024-11-18 PROCEDURE — 97112 NEUROMUSCULAR REEDUCATION: CPT | Mod: GP | Performed by: PHYSICAL THERAPIST

## 2024-11-18 PROCEDURE — 97140 MANUAL THERAPY 1/> REGIONS: CPT | Mod: GP | Performed by: PHYSICAL THERAPIST

## 2024-11-19 ENCOUNTER — THERAPY VISIT (OUTPATIENT)
Dept: PHYSICAL THERAPY | Facility: CLINIC | Age: 74
End: 2024-11-19
Payer: COMMERCIAL

## 2024-11-19 DIAGNOSIS — L90.5 SCAR CONDITION AND FIBROSIS OF SKIN: ICD-10-CM

## 2024-11-19 DIAGNOSIS — I89.0 LYMPHEDEMA OF LEFT UPPER EXTREMITY: Primary | ICD-10-CM

## 2024-11-19 DIAGNOSIS — I89.0 LYMPHEDEMA OF BOTH LOWER EXTREMITIES: ICD-10-CM

## 2024-11-19 PROBLEM — S62.336D CLOSED DISPLACED FRACTURE OF NECK OF FIFTH METACARPAL BONE OF RIGHT HAND WITH ROUTINE HEALING, SUBSEQUENT ENCOUNTER: Status: RESOLVED | Noted: 2024-09-09 | Resolved: 2024-11-19

## 2024-11-19 PROBLEM — M25.641 STIFFNESS OF FINGER JOINT OF RIGHT HAND: Status: RESOLVED | Noted: 2024-09-09 | Resolved: 2024-11-19

## 2024-11-19 PROCEDURE — 97140 MANUAL THERAPY 1/> REGIONS: CPT | Mod: GP | Performed by: PHYSICAL THERAPIST

## 2024-11-19 PROCEDURE — 97535 SELF CARE MNGMENT TRAINING: CPT | Mod: GP | Performed by: PHYSICAL THERAPIST

## 2024-11-20 ENCOUNTER — THERAPY VISIT (OUTPATIENT)
Dept: PHYSICAL THERAPY | Facility: CLINIC | Age: 74
End: 2024-11-20
Payer: COMMERCIAL

## 2024-11-20 ENCOUNTER — MYC REFILL (OUTPATIENT)
Dept: INTERNAL MEDICINE | Facility: CLINIC | Age: 74
End: 2024-11-20

## 2024-11-20 DIAGNOSIS — Z85.3 HX: BREAST CANCER: ICD-10-CM

## 2024-11-20 DIAGNOSIS — L90.5 SCAR CONDITION AND FIBROSIS OF SKIN: ICD-10-CM

## 2024-11-20 DIAGNOSIS — R29.898 RIGHT LEG WEAKNESS: ICD-10-CM

## 2024-11-20 DIAGNOSIS — R53.1 DECREASED STRENGTH: ICD-10-CM

## 2024-11-20 DIAGNOSIS — I10 BENIGN ESSENTIAL HYPERTENSION: ICD-10-CM

## 2024-11-20 DIAGNOSIS — I89.0 LYMPHEDEMA OF BOTH LOWER EXTREMITIES: ICD-10-CM

## 2024-11-20 DIAGNOSIS — I89.0 LYMPHEDEMA OF LEFT UPPER EXTREMITY: Primary | ICD-10-CM

## 2024-11-20 PROCEDURE — 97140 MANUAL THERAPY 1/> REGIONS: CPT | Mod: GP | Performed by: PHYSICAL THERAPIST

## 2024-11-20 PROCEDURE — 97110 THERAPEUTIC EXERCISES: CPT | Mod: GP | Performed by: PHYSICAL THERAPIST

## 2024-11-20 RX ORDER — SPIRONOLACTONE 50 MG/1
50 TABLET, FILM COATED ORAL
Qty: 90 TABLET | Refills: 3 | Status: SHIPPED | OUTPATIENT
Start: 2024-11-20

## 2024-11-21 ENCOUNTER — THERAPY VISIT (OUTPATIENT)
Dept: PHYSICAL THERAPY | Facility: CLINIC | Age: 74
End: 2024-11-21
Payer: COMMERCIAL

## 2024-11-21 ENCOUNTER — TELEPHONE (OUTPATIENT)
Dept: CARDIOLOGY | Facility: CLINIC | Age: 74
End: 2024-11-21

## 2024-11-21 DIAGNOSIS — R26.89 IMPAIRMENT OF BALANCE: Primary | ICD-10-CM

## 2024-11-21 DIAGNOSIS — L90.5 SCAR CONDITION AND FIBROSIS OF SKIN: ICD-10-CM

## 2024-11-21 DIAGNOSIS — I89.0 LYMPHEDEMA OF LEFT UPPER EXTREMITY: ICD-10-CM

## 2024-11-21 RX ORDER — SPIRONOLACTONE 25 MG/1
50 TABLET ORAL DAILY
Qty: 180 TABLET | Refills: 3 | OUTPATIENT
Start: 2024-11-21

## 2024-11-21 NOTE — TELEPHONE ENCOUNTER
Left Voicemail (1st Attempt) for the patient to call back and schedule the following:    Appointment type:  rtn cardio oncology   Provider: jake   Return date: 01/06/25  Specialty phone number: [phone] opt 1   Additional appointment(s) needed: n/a   Additonal Notes: n/a

## 2024-11-21 NOTE — PROGRESS NOTES
11/21/24 0500   Appointment Info   Signing clinician's name / credentials SHEN Davey CLT-LANA   Total/Authorized Visits [facility] Medicare Advantage ; certification   Visits Used 21Edema   Medical Diagnosis lymphedema   PT Tx Diagnosis lymphedema, fibrosis, pain, impaired balance, decreased strength   Precautions/Limitations osteoporosis; h/o multiple falls and fractures   Other pertinent information L breast cancer 2007 s/p B mastectomy, chemo and radiation; Anemia; Arthritis; Bladder or bowel problems; Cancer; Dizziness; Fibromyalgia; Foot drop; Hearing problems; Heart problems; Hepatitis; High blood pressure; History of fractures; Menopause; Osteoarthritis; Osteoporosis; Overweight; Pain at night or rest; Progressive neurological deficits; Radiation treatment; Severe dizziness; Thyroid problems; Vision problems  pt stating exacerbation of L UE lyphema due to pain of wrists, falls with fracture and inability to transition to planned garments and bandaging 1x/week.; newly diagnsosed DM2 but opting to trial diet and exercise   Progress Note/Certification   Start of Care Date 12/05/23   Onset of illness/injury or Date of Surgery 11/02/23  (date of order Ashley Perry PA-C; Survivorship clinic)   Therapy Frequency 1 visit within 2 weeks then 4x/week x 2 weeks then 1x/week x 2 weeks then 1x/month   Predicted Duration 90 days   Certification date from 09/25/24   Certification date to 12/23/24   Progress Note Completed Date 09/27/24   PT Goal 1   Goal Identifier Home program   Goal Description Patient will be independent in home program to manage conditions of lymphedema and fibrosis, impaired balance.   Rationale to maximize safety and independence with performance of ADLs and functional tasks;to maximize safety and independence within the home;to maximize safety and independence within the community;to maximize safety and independence with transportation;to maximize safety and independence with self cares   Goal  Telephone Encounter by Carmen Patton, RN at 07/09/18 03:00 PM     Author:  Carmen Patton RN Service:  (none) Author Type:  Registered Nurse     Filed:  07/09/18 03:00 PM Encounter Date:  7/9/2018 Status:  Signed     :  Carmen Patton RN (Registered Nurse)            Left detailed message on patient's answering machine with Dr. Mccartney's message.[DP1.1M]      Revision History        User Key Date/Time User Provider Type Action    > DP1.1 07/09/18 03:00 PM Carmen Patton, RN Registered Nurse Sign    M - Manual             Progress progressing   Target Date 12/23/24   PT Goal 2   Goal Identifier UE Edema / Volume   Goal Description Patient will demonstrate decreased volume of L  UE  by 5% and demonstrate stable volumes to decrease risk of infection.   Rationale to maximize safety and independence with performance of ADLs and functional tasks;to maximize safety and independence within the home;to maximize safety and independence within the community;to maximize safety and independence with transportation;to maximize safety and independence with self cares   Goal Progress MET: 9/27/2024: 2285ml L>R = 16.6%; 11/21/2024: L UE decreased 9.5%   Target Date 12/23/24  (goal date extended as just initated intensive CLT)   Date Met 11/21/24   PT Goal 3   Goal Identifier LLIS   Goal Description Patient will demonstrate an 5 point decrease in LLIS to demonstrate a decreased impact of lymphedema on function.   Rationale to maximize safety and independence with self cares   Goal Progress 11/12/2024: 26   Target Date 12/23/24   Subjective Report   Subjective Report to dept in GCB L UE from yesterday and continued  improved comfort   Objective Measure 1   Objective Measure UE edema   Details continued edema reduction with visible skin wrinkling L UE especially around elbow   Objective Measure 2   Objective Measure LE edema  / circumferences   Details 2+ distal pretibial pitting; L>R LE   Objective Measure 3   Objective Measure volume UE to 40cm   Details to 40cm: L decreased 9.5%; L>R= 7.8%   Objective Measure 6   Objective Measure LLIS   Treatment Interventions (PT)   Interventions Manual Therapy;Self Care/Home Management   Manual Therapy   Manual Therapy: Mobilization, MFR, MLD, friction massage minutes (34104) 45   Manual Therapy 1 volume, GCB, MLD   Manual Therapy 1 - Details volume; to dept in GCB L UE with good tolerance and good gradient comrpession, removed demonstrating decrease edema with increased skin mobilty, volume assessed  dmeonstrarting good reduction.  performing STM to areas of fibrosis emphasis on medial elbow followed by MLD trunk adn L UE wit emphasis onclearing thorugh L axillo inguinal pathway.  further instructon in POC incluidng reapplying GCB L UE Sat then following fitter appt Monday. laundering bandages over weekend   Skilled Intervention CLT to address L UE/UQ lymphedema with fibrosis   Patient Response/Progress progressing with improving tissue mobilty and visible decrease in edema   Self Care/home Management   ADL/Home Mgmt Training (49506) 10   Self Care 1 garments, compression pump, home program management   Self Care 1 - Details issued bandgaing order sheet and issued recommendations and prioritizing of garments and instructing pt to continue to locate Juzo Easy slide octavia for compression sleeve and tribute donning   further educating pt in benefit of transitioning to custom coarser knit weave comrpession sleeve for imrpoved lymphedema control and fibrotic softening  as well as increased consistency using tribute night garment to limit edema refill and use of Flexitouch compression pump daily once able to pursue new sleeve with zipper for ability to self don/doff.   Skilled Intervention assessment and modification of home lymphedema managment   Patient Response/Progress verbalized understanding   Education   Learner/Method Patient   Plan   Home program standing GS stretch, standing HR/ seated TR, STS x 5 x 2 sets, walking with 4ww/ SEC to mailbox most days; Nustep progressing to 150'/week; Pilates class 1-2x/week; UE / LE edema exs daily, positioning to decrease edema, GCB L UE x 24hrs ; wear compresression knee highs daily once receive custom socks; split stance hip hikes, theraband UE rows or isoetric B shoulder extension, core(Pilates, bridging, SLR, CS), balance (split stance or mtandem stance), vestibular drills  fitter 10/25/2024, continue with GCB as able with intermittent daytime wear of prior comrpession  sleeve until new garments received.   Updates to plan of care valid;   Plan for next session ask how [place]  went?  assess new garments (custom class 2 or 1 sleeve and gauntlet, Tribute night garments, ready made sleeve and gauntlet),  volume L UE, assess benefit of  pursuing updated Flexitouch sleeve with zipper for abiltiy to don/doff than velcro due to decreased strength and pain, assess effectivness of updated HEP   Comments   Comments (x2) 2024; size large class 1 Juzo compression sleeve and gauntlet; scheduled with [facility] 2024 for new mastectomy bra and ike for new compression sleeve (consider custom class 2) with gauntlet and possibly new night garment (consider either tribute or Tribute wrap) and would benefit from Flexitouch vendor assessment of current garment as may benefit from zipper style for imrpoved donning/doffing due to current strength and pain limitations and in ready made compression knee highs in lighter compression to allow for improved donning/doffing   Total Session Time   Timed Code Treatment Minutes 55   Total Treatment Time (sum of timed and untimed services) 55         PLAN  Continue therapy per current plan of care.    Beginning/End Dates of Progress Note Reporting Period:  24 to 2024    Referring Provider:  Ashley Perry

## 2024-11-22 ENCOUNTER — MYC MEDICAL ADVICE (OUTPATIENT)
Dept: PHARMACY | Facility: CLINIC | Age: 74
End: 2024-11-22

## 2024-11-22 DIAGNOSIS — I10 BENIGN ESSENTIAL HYPERTENSION: Primary | ICD-10-CM

## 2024-11-26 ENCOUNTER — DOCUMENTATION ONLY (OUTPATIENT)
Dept: INTERNAL MEDICINE | Facility: CLINIC | Age: 74
End: 2024-11-26

## 2024-11-26 ENCOUNTER — OFFICE VISIT (OUTPATIENT)
Dept: ORTHOPEDICS | Facility: CLINIC | Age: 74
End: 2024-11-26
Payer: COMMERCIAL

## 2024-11-26 ENCOUNTER — ANCILLARY PROCEDURE (OUTPATIENT)
Dept: GENERAL RADIOLOGY | Facility: CLINIC | Age: 74
End: 2024-11-26
Attending: FAMILY MEDICINE
Payer: COMMERCIAL

## 2024-11-26 DIAGNOSIS — S97.101A CRUSHING INJURY OF TOE OF RIGHT FOOT, INITIAL ENCOUNTER: ICD-10-CM

## 2024-11-26 DIAGNOSIS — S92.501D: ICD-10-CM

## 2024-11-26 DIAGNOSIS — S97.101A CRUSHING INJURY OF TOE OF RIGHT FOOT, INITIAL ENCOUNTER: Primary | ICD-10-CM

## 2024-11-26 PROCEDURE — 73630 X-RAY EXAM OF FOOT: CPT | Mod: RT | Performed by: RADIOLOGY

## 2024-11-26 PROCEDURE — 99213 OFFICE O/P EST LOW 20 MIN: CPT | Performed by: FAMILY MEDICINE

## 2024-11-26 NOTE — PROGRESS NOTES
ESTABLISHED PATIENT FOLLOW-UP:  Pain of the Right Foot       HISTORY OF PRESENT ILLNESS  Ms. Perez is a pleasant 74 year old year old female who presents to clinic today for follow-up of right toe pain.    Date of injury: 10/21/24 dropping heavy steel water bottle on her R foot.  Date last seen: 10/22/24  Following Therapeutic Plan: Yes  Pain: Improved  Function: Improved  Interval History: Ismael notes minimal pain related to this issue, known neuropathy with decreased sensation already.  Notes since last visit developed increased ecchymosis of foot. This has began to fade.  Utilizing AFO for walking which has helped toe pain. No other issues.    Additional medical/Social/Surgical histories reviewed in [facility] and updated as appropriate.    REVIEW OF SYSTEMS (11/26/2024)  CONSTITUTIONAL: Denies fever and weight loss  GASTROINTESTINAL: Denies abdominal pain, nausea, vomiting  MUSCULOSKELETAL: See HPI  SKIN: Denies any recent rash or lesion  NEUROLOGICAL: Denies numbness or focal weakness     PHYSICAL EXAM  LMP  (LMP Unknown)     General  - normal appearance, in no obvious distress  Musculoskeletal - right foot  - stance: Mildly antalgic gait  - inspection: Swelling of right fourth toe, mild erythema and ecchymosis diffusely right fourth toe.  - palpation: Diffuse fourth proximal, middle and distal phalanx tenderness of fourth toe.  - ROM: Decreased ROM of fourth toe.  - strength:Grossly intact  Neuro  - Decreased sensation to light touch diffusely at foot, grossly normal coordination, normal muscle tone    IMAGING : Left foot xray 3 views. Final results and radiologist's interpretation, available in the [facility] health record. Images were reviewed with the patient/family members in the office today. My personal interpretation of the performed imaging is faint lucency noted obliquely at distal phalanx of fourth toe.     ASSESSMENT & PLAN  Ms. Perez is a 74 year old year old female hx osteoporosis on Eviniti, peripheral  neuropathy of feet, right foot drop secondary to lumbar stenosis who presents to clinic today with acute right fourth toe pain after crush injury dropping steel water bottle on her toe 5 weeks ago.     Diagnosis:   Nondisplaced fracture of distal phalanx of right fourth toe  Age related Osteoporosis    X-ray overall with stable appearance of very faint lucency, questionable fracture of the fourth toe right foot.  This is correlating to area of crush injury as well as tenderness on examination.  Therefore I did make the clinical correlation for likely fracture.  We discussed advanced imaging would not definitively show fracture however would not change her treatment plan at this time and therefore not be warranted.    She will continue to use her AFO of her right foot which she utilizes for her foot drop.  This will continue to protect her toe. No additional xrays or visit needed unless increased pain, swelling, or debility arises.  Continue to monitor and I anticipate complete resolution in next 3-4 weeks.    Continue osteoporosis management per bone health team.    It was a pleasure seeing Miguel Borjas DO, CAQSM  Primary Care Sports Medicine

## 2024-11-26 NOTE — PROGRESS NOTES
Type of Form Received: [facility] needed    Form Received (Date) 11/26/24   Form Filled out No   Placed in provider folder Yes

## 2024-11-26 NOTE — LETTER
11/26/2024      RE: Nadira Perez  [address]  [place] [facility] [address]     Dear Colleague,    Thank you for referring your patient, Nadira Perez, to the [facility] [place]. Please see a copy of my visit note below.    ESTABLISHED PATIENT FOLLOW-UP:  Pain of the Right Foot       HISTORY OF PRESENT ILLNESS  Ms. Perez is a pleasant 74 year old year old female who presents to clinic today for follow-up of right toe pain.    Date of injury: 10/21/24 dropping heavy steel water bottle on her R foot.  Date last seen: 10/22/24  Following Therapeutic Plan: Yes  Pain: Improved  Function: Improved  Interval History: Ismael notes minimal pain related to this issue, known neuropathy with decreased sensation already.  Notes since last visit developed increased ecchymosis of foot. This has began to fade.  Utilizing AFO for walking which has helped toe pain. No other issues.    Additional medical/Social/Surgical histories reviewed in [facility] and updated as appropriate.    REVIEW OF SYSTEMS (11/26/2024)  CONSTITUTIONAL: Denies fever and weight loss  GASTROINTESTINAL: Denies abdominal pain, nausea, vomiting  MUSCULOSKELETAL: See HPI  SKIN: Denies any recent rash or lesion  NEUROLOGICAL: Denies numbness or focal weakness     PHYSICAL EXAM  LMP  (LMP Unknown)     General  - normal appearance, in no obvious distress  Musculoskeletal - right foot  - stance: Mildly antalgic gait  - inspection: Swelling of right fourth toe, mild erythema and ecchymosis diffusely right fourth toe.  - palpation: Diffuse fourth proximal, middle and distal phalanx tenderness of fourth toe.  - ROM: Decreased ROM of fourth toe.  - strength:Grossly intact  Neuro  - Decreased sensation to light touch diffusely at foot, grossly normal coordination, normal muscle tone    IMAGING : Left foot xray 3 views. Final results and radiologist's interpretation, available in the [facility] health record. Images were reviewed with the  patient/family members in the office today. My personal interpretation of the performed imaging is faint lucency noted obliquely at distal phalanx of fourth toe.     ASSESSMENT & PLAN  Ms. Perez is a 74 year old year old female hx osteoporosis on Eviniti, peripheral neuropathy of feet, right foot drop secondary to lumbar stenosis who presents to clinic today with acute right fourth toe pain after crush injury dropping steel water bottle on her toe 5 weeks ago.     Diagnosis:   Nondisplaced fracture of distal phalanx of right fourth toe  Age related Osteoporosis    X-ray overall with stable appearance of very faint lucency, questionable fracture of the fourth toe right foot.  This is correlating to area of crush injury as well as tenderness on examination.  Therefore I did make the clinical correlation for likely fracture.  We discussed advanced imaging would not definitively show fracture however would not change her treatment plan at this time and therefore not be warranted.    She will continue to use her AFO of her right foot which she utilizes for her foot drop.  This will continue to protect her toe. No additional xrays or visit needed unless increased pain, swelling, or debility arises.  Continue to monitor and I anticipate complete resolution in next 3-4 weeks.    Continue osteoporosis management per bone health team.    It was a pleasure seeing Miguel Borjas DO, [facility]  Primary Care Sports Medicine        Again, thank you for allowing me to participate in the care of your patient.      Sincerely,    Willis Borjas DO

## 2024-12-04 ENCOUNTER — THERAPY VISIT (OUTPATIENT)
Dept: PHYSICAL THERAPY | Facility: CLINIC | Age: 74
End: 2024-12-04
Payer: COMMERCIAL

## 2024-12-04 DIAGNOSIS — R42 DIZZINESS: ICD-10-CM

## 2024-12-04 DIAGNOSIS — R29.898 RIGHT LEG WEAKNESS: Primary | ICD-10-CM

## 2024-12-04 DIAGNOSIS — M21.371 RIGHT FOOT DROP: ICD-10-CM

## 2024-12-04 PROCEDURE — 97112 NEUROMUSCULAR REEDUCATION: CPT | Mod: GP

## 2024-12-04 NOTE — PROGRESS NOTES
12/04/24 0500   Appointment Info   Signing clinician's name / credentials Sari Ruiz, PT, DPT, NCS   Visits Used 8 Weakness/Dizziness   Medical Diagnosis Right leg weakness; Right foot drop; dizziness   PT Tx Diagnosis Right leg weakness  Right foot drop, impairment of balance with household/community activities; dizziness   Precautions/Limitations osteoporosis; h/o multiple falls and fractures   Progress Note/Certification   Start of Care Date 10/25/24   Onset of illness/injury or Date of Surgery 09/06/24  (order date due to chronicity of problem)   Therapy Frequency 1x/week   Predicted Duration 60 days   Certification date from 10/25/24   Certification date to 12/23/24   Progress Note Due Date   (at 10th)   Progress Note Completed Date 10/25/24   GOALS   PT Goals 2;3;4;5   PT Goal 1   Goal Identifier MET: HEP   Goal Description Pt will demonstrate IND with strength, balance, and muscular and aerobic endurance HEP, while working to improve capacity for functional mobility.   Goal Progress 12/4/2024: Pt has demonstrated IND with HEP.   Target Date 12/23/24   Date Met 12/04/24   PT Goal 2   Goal Identifier 5xSTS   Goal Description Pt will complete x5 STS in <12s without use of B UEs, to demonstrate increased B LE strength and reduced risk for falling.   Goal Progress 12/4/2024: Not reassessed - pt to continue B LE strengthening HEP. Clinical judgment suspecting >12s at this time. She also likely remains a fall risk - therefore, should continue using SPC or 4WW for safety.   Target Date 12/23/24   PT Goal 3   Goal Identifier FGA   Goal Description Pt will score >22/30pts on FGA, to demonstrate improved dynamic balance and postural control with ambulation and decreased risk for falling with functional mobility.   Goal Progress 12/4/2024: Not reassessed - pt to continue balance and vestibular HEPs. Clinical judgment suspecting <23/30 at this time. She likely remains a fall risk - therefore, should continue using  SPC or 4WW for safety.   Target Date 12/23/24   PT Goal 4   Goal Identifier MET: 10 MWT   Goal Description Patient will achieve MDC for self-selected gait speeds to show improved gait efficiency, gurinder, and mechanics to reduce risk for falling.   Goal Progress 9/30/2024: comf=1.02m/s - pt not at risk for falling.   Target Date 12/23/24   Date Met 09/30/24   PT Goal 5   Goal Identifier MET: Dizziness   Goal Description Pt will report dizziness of 0-2/10 with her typical daily activities, demonstrating habituation of her vestibular symptoms.   Goal Progress 12/4/2024: 2/10 at most with exercises provided.   Target Date 12/23/24   Date Met 12/04/24   Subjective Report   Subjective Report Pt reporting she had a nice journey to [place]. Had one fall in the airport. Her family member helped her return to standing. She got back to [place] last night. Dizziness was not a problem. She does feel a little lightheaded today.   Objective Measures   Objective Measures Objective Measure 1;Objective Measure 2;Objective Measure 3   Objective Measure 1   Objective Measure FGA   Details <22/30 indicative of inc risk for falling with community ambulation   Objective Measure 2   Objective Measure 10MWT   Details MCID for small meaningful change = 0.05 m/s; MCID for substantial meaningful change = 0.13 m/s.   Objective Measure 3   Objective Measure 5xSTS   Details Initial screening tool-cut off score of greater than or equal to 12 seconds to identify need of further assessment for fall risk.   Treatment Interventions (PT)   Interventions Neuromuscular Re-education   Neuromuscular Re-education   Neuromuscular re-ed of mvmt, balance, coord, kinesthetic sense, posture, proprioception minutes (58282) 40   Neuro Re-ed 1 Performed program of vestibular adaptation and habituation to improve pt's tolerance to vestibular input/reduce dizziness. She performed VORx1 in seated for 20s H/V x2 of each with report of inc dizziness to a 0-2/10, at most.  "This is less dizziness than at her previous session. Completed additional VOR Cx 10x1 in H, R/L diagonal, and \"rainbows\"; inc of dizziness to 2/10, at most. Again, an improved tolerance to the activity today than at her previous session. Trialed seated and standing with HHT and VHT; 5x1 - inc dizziness with VHT > HHT for both seated and standing conditions. PT recommending continued VORx1 H/V, VOR Cx H, and seated with HHT/VHT x5 of each for HEP. Pt in support.   Neuro Re-ed 1 - Details PT feels pt has demonstrated IND with HEP for vestibular function. The HEP has helped to manage/reduce her symptom level and allowed for a dizziness free trip to [place]. PT/pt in agreement for discharge to IND with HEP today. She is to continue her strength and balance exercises, too; also, her pilates class. Pt in support. Can return to PT in future if need arises.   Skilled Intervention Facilitated vestibular rehabilitation, static/dynamic balance, and postural stability training with cues and assistance as needed and progressive increase of difficulty as needed to promote improved safety and independence in the home and community, education provided for home exercise program understanding.   Education   Learner/Method Patient;Listening;Demonstration;Pictures/Video;Reading;No Barriers to Learning   Plan   Plan for next session Discharged from [facility].   Total Session Time   Timed Code Treatment Minutes 40   Total Treatment Time (sum of timed and untimed services) 40       DISCHARGE  Reason for Discharge: No further expectation of progress with vestibular intervention at this time, as her dizziness is now under control to a 0-2/10, at most. She's to continue with HEP for self-management.    Equipment Issued: SPC or 4WW of her own.    Discharge Plan: Patient to continue home program.    Referring Provider:  MERCEDES Pace CNP    "

## 2024-12-09 NOTE — PROGRESS NOTES
Video-Visit Details    Type of service:  Video Visit     Originating Location (pt. Location): Home    Distant Location (provider location):  Off-site  Platform used for Video Visit: [facility]     Date of visit: 12/10/2024      Assessment:   Nadira Perez is a 74 year old year old female  who presents to the pain clinic with low back pain. It has been noted that the caudal GREGOR covers her chronic back pain the best thus far. Giong forward we will try to refrain from repeating SI joint and L3-4 GREGOR injection to avoid the side effects of cumulative steroids. Prior discussion with Dr. Umaña -  she is receiving steroid injections in the wrist, need to keep a count of annual steroid injections received.         [facility] [place] [facility] [place]    Visit Diagnoses:  1. Chronic pain syndrome    2. Balance problem    3. Right foot drop    4. Right leg weakness    5. Neuropathic pain    6. Peripheral polyneuropathy    7. Myofascial pain    8. Chronic bilateral low back pain, unspecified whether sciatica present        Plan:  Diagnosis reviewed, treatment option addressed, and risk/benifits discussed.  Self-care instructions given.  I am recommending a multidisciplinary treatment plan to help this patient better manage their pain.      Physical Therapy:  YES     Pain PT with [facility] Jauregui previously.   New referral for PT placed today, per patient request.   RLE lower leg atrophy, potentially from use of brace and chronic low back pain.     Pain Psychology: No     Diagnostic Studies:    No new orders today.      Medication Management:   Gabapentin 1200 mg TID and tizanidine 4 mg at bedtime PRN. PCP managing.   Topicals - Diclofenac gel and lidocaine cream   Medical cannabis/CBD - appreciates benefit, recent use of OTC products.      Potential procedures:   Caudal GREGOR - last completed on 1/2/24. Appreciates benefit. May repeat every 3+ months. Advised to contact clinic if interested in repeat  injection.   Bilateral SI joint injections completed on 10/2/24 with Dr. Saldivar. Reports significant benefit for 5 weeks, then pain increased to an extent but still less than before injection. Will continue to monitor benefit and may consider repeat in future if appropriate.      Other Orders/Referrals:   None      Follow up with MERCEDES aPce CNP in 4-6 months or sooner if needed       Review of Electronic Chart: Today I have also reviewed available medical information in the patient's medical record at [facility] ([facility]) and Care Everywhere (if available), including relevant provider notes, laboratory work, and imaging.     Renée Harris DNP, MERCEDES, AGNP-C  [facility] Pain Management     -------------------------------------------------    Subjective:    Chief complaint:   Chief Complaint   Patient presents with    RECHECK     Follow up 3 month       Interval history:  Nadira Perez is a 74 year old female last seen on 9/6/24.      Recommendations/plan at the last visit included:  Physical Therapy:  YES     Pain PT with Otto Jauregui previously.   Referral for PT to target          RLE - concerns for lower leg atrophy, potentially from use of brace. Scheduling number is [phone].      Pain Psychology: No     Diagnostic Studies:    No new orders today.      Medication Management:   Gabapentin 1200 mg TID and tizanidine 4 mg at bedtime PRN. PCP managing.   Topicals - Diclofenac gel and lidocaine cream   Medical cannabis/CBD - appreciates benefit, recent use of OTC products.      Potential procedures:   Caudal GREGOR - last completed on 1/2/24. Appreciates benefit. May repeat every 3+ months. Advised to contact clinic if interested in repeat injection.   Bilateral SI joint injections scheduled on 10/2 with Dr. Saldivar.      Other Orders/Referrals:   None      Follow up with MERCEDES Pace CNP in 3-6 months or sooner if needed     Since her last visit, Nadira Perez reports:  -Her brother  recently . He had hydrocephalus related to concussions in the past. He had dementia. She went to [facility] for his  the day after [place]. She was able to get through airport okay with wheelchair.   -She is using cane and walker to help with balance.   -She continues lymphedema PT.  -She does piliates regularly, reports benefit for low back pain.   -She had SI joint injections on 10/2 with Dr. Saldivar. She reports significant benefit for 5 weeks, but then pain has started to gradually return.   -She continues to appreciate benefit with medications: gabapentin, tizanidine, and medical cannabis.   -She requests a new PT referral to continue working on RLE and also strength/conditioning.     PEG: A Three-Item Scale Assessing Pain Intensity and Interference    What number best describes your PAIN ON AVERAGE in the past week? (Patient-Rptd) 4    What number best describes how, during the past week, pain has interfered with your ENJOYMENT OF LIFE? (Patient-Rptd) 4    What number best describes how, during the past week, pain has interfered with your GENERAL ACTIVITY? (Patient-Rptd) 4    PEG Total Score: (Patient-Rptd) 4    Marin CROWE, Rocky KA, Maxwell MJ, Eden TA, Jarvis J, Ken JM, Pooja SM, Jewel K. Development and initial validation of the PEG, a 3-item scale assessing pain intensity and interference. Journal of General Internal Medicine. 2009 Charlie;24:733-738.        HPI/Interval history from last visit:  -She is working on weight loss right now.   -Bilateral SIJ injections scheduled on 10/2 with Dr. Saldivar. She states this is first time, has been asking for SI injections for a while.   -New Closed displaced fracture of neck of fifth metacarpal bone of right hand, saw Dr. Morgan in Sports Med on 7/10 for initial evaluation.   -She got cast off about 2 weeks ago, currently engaging in hand therapy.   -She completed sleep study since last visit, has follow up on 24.   -She also has concerns for  "lower RLE muscle atrophy, potentially   -She will consider sooner follow up with me after SI joint injections if not effective.   -She recently purchased sleep number bed.   -She was previously prescribing hydrocodone #10 tabs/month in the past, states that she felt like she was \"judged\" at one point when getting discharged from hospital.   -She is managing with current pain plan.        Current Pain Treatments:    Medications:                             Tizanidine 4 mg at bedtime  (PCP)              Gabapentin 1200 mg TID (PCP manages)              OTC cannabis/CBD     2. Other therapies:               Pain PT HEP              Piliates/HEP              Heat/Ice              Caudal GREGOR, SI joint injections         Current MME: N/A     Review of [place] Prescription Monitoring Program (): YES        Past pain treatments:  Hydrocodone 5-325 mg PRN (PCP managed), stopped due to concerns for stigma/judgement by health care providers. Could consider resuming occasional use.      Medications:  Current Outpatient Medications   Medication Sig Dispense Refill    acetaminophen (TYLENOL) 500 MG tablet Take 1,000 mg by mouth 3 times daily      artificial tears OINT ophthalmic ointment Place into both eyes nightly as needed for dry eyes.      Ascorbic Acid (VITAMIN C) 500 MG CHEW Take 1 tablet by mouth every morning      atorvastatin (LIPITOR) 80 MG tablet Take 1 tablet (80 mg) by mouth daily. 90 tablet 3    carvedilol (COREG) 12.5 MG tablet Take 1 tablet (12.5 mg) by mouth 2 times daily (with meals). 180 tablet 3    CRANBERRY EXTRACT PO Take 500 mg by mouth every morning      diclofenac (VOLTAREN) 1 % topical gel Apply 2 g topically 4 times daily 100 g 6    gabapentin (NEURONTIN) 300 MG capsule Take 4 capsules (1,200 mg) by mouth 3 times daily. 1080 capsule 3    HEMP OIL OR EXTRACT OR OTHER CBD CANNABINOID, NOT MEDICAL CANNABIS, THC      hydrochlorothiazide (HYDRODIURIL) 25 MG tablet Take 1 tablet (25 mg) by mouth daily. " 90 tablet 3    levothyroxine (SYNTHROID/LEVOTHROID) 112 MCG tablet TAKE 1/2 TAB BY MOUTH EVERY DAY 45 tablet 3    lisinopril (ZESTRIL) 20 MG tablet Take 1 tablet (20 mg) by mouth 2 times daily. 180 tablet 3    MAGNESIUM GLYCINATE PO Take 400 mg by mouth at bedtime.      Melatonin 10 MG TABS tablet Take 10 mg by mouth nightly as needed.      Multiple Vitamin (MULTI-VITAMIN) per tablet Take 1 tablet by mouth every morning      omeprazole (PRILOSEC) 20 MG DR capsule Take 1 capsule (20 mg) by mouth daily. 90 capsule 3    ondansetron (ZOFRAN) 4 MG tablet Take 1 tablet (4 mg) by mouth every 6 hours as needed for nausea. 12 tablet 3    ondansetron (ZOFRAN-ODT) 4 MG ODT tab Take 1 tablet (4 mg) by mouth every 12 hours as needed for nausea 180 tablet 3    spironolactone (ALDACTONE) 50 MG tablet Take 1 tablet (50 mg) by mouth daily at 2 pm. 90 tablet 3    tiZANidine (ZANAFLEX) 4 MG tablet Take 1 tablet (4 mg) by mouth at bedtime. (Patient taking differently: Take 4 mg by mouth nightly as needed for muscle spasms.) 90 tablet 3    Turmeric 500 MG CAPS Take 500 mg by mouth every morning         Medical History: any changes in medical history since they were last seen? No      Objective:    Physical Exam:  GENERAL: alert and no distress  EYES: Eyes grossly normal to inspection.  No discharge or erythema, or obvious scleral/conjunctival abnormalities.  RESP: No audible wheeze, cough, or visible cyanosis.    SKIN: Visible skin clear. No significant rash, abnormal pigmentation or lesions.  NEURO: Cranial nerves grossly intact.  Mentation and speech appropriate for age.  PSYCH: Appropriate affect, tone, and pace of words     Diagnostic Tests/Imaging/Labs:  N/A    BILLING TIME DOCUMENTATION:   The total TIME spent on this patient on the date of the encounter/appointment was 30 minutes.      TOTAL TIME includes:   Time spent preparing to see the patient (reviewing records and tests)   Time spent face to face (or over the phone) with  the patient   Time spent ordering tests, medications, procedures and referrals   Time spent Referring and communicating with other healthcare professionals   Time spent documenting clinical information in Epic

## 2024-12-10 ENCOUNTER — VIRTUAL VISIT (OUTPATIENT)
Dept: ANESTHESIOLOGY | Facility: CLINIC | Age: 74
End: 2024-12-10
Payer: COMMERCIAL

## 2024-12-10 DIAGNOSIS — G89.4 CHRONIC PAIN SYNDROME: Primary | ICD-10-CM

## 2024-12-10 DIAGNOSIS — G62.9 PERIPHERAL POLYNEUROPATHY: ICD-10-CM

## 2024-12-10 DIAGNOSIS — R29.898 RIGHT LEG WEAKNESS: ICD-10-CM

## 2024-12-10 DIAGNOSIS — M79.18 MYOFASCIAL PAIN: ICD-10-CM

## 2024-12-10 DIAGNOSIS — M79.2 NEUROPATHIC PAIN: ICD-10-CM

## 2024-12-10 DIAGNOSIS — M21.371 RIGHT FOOT DROP: ICD-10-CM

## 2024-12-10 DIAGNOSIS — R26.89 BALANCE PROBLEM: ICD-10-CM

## 2024-12-10 DIAGNOSIS — G89.29 CHRONIC BILATERAL LOW BACK PAIN, UNSPECIFIED WHETHER SCIATICA PRESENT: ICD-10-CM

## 2024-12-10 DIAGNOSIS — M54.50 CHRONIC BILATERAL LOW BACK PAIN, UNSPECIFIED WHETHER SCIATICA PRESENT: ICD-10-CM

## 2024-12-10 PROCEDURE — 99214 OFFICE O/P EST MOD 30 MIN: CPT | Mod: 95

## 2024-12-10 ASSESSMENT — PAIN SCALES - PAIN ENJOYMENT GENERAL ACTIVITY SCALE (PEG)
INTERFERED_ENJOYMENT_LIFE: 4
PEG_TOTALSCORE: 4
INTERFERED_GENERAL_ACTIVITY: 4
AVG_PAIN_PASTWEEK: 4

## 2024-12-10 ASSESSMENT — PAIN SCALES - GENERAL: PAINLEVEL_OUTOF10: MODERATE PAIN (4)

## 2024-12-10 NOTE — LETTER
12/10/2024       RE: Nadira Perez  [address]  [facility] [address]     Dear Colleague,    Thank you for referring your patient, Nadira Perez, to the [facility]NAGEMENT [place] at [facility]. Please see a copy of my visit note below.    Video-Visit Details    Type of service:  Video Visit     Originating Location (pt. Location): Home    Distant Location (provider location):  Off-site  Platform used for Video Visit: [facility]     Date of visit: 12/10/2024      Assessment:   Nadira Perez is a 74 year old year old female  who presents to the pain clinic with low back pain. It has been noted that the caudal GREGOR covers her chronic back pain the best thus far. Giong forward we will try to refrain from repeating SI joint and L3-4 GREGOR injection to avoid the side effects of cumulative steroids. Prior discussion with Dr. Umaña -  she is receiving steroid injections in the wrist, need to keep a count of annual steroid injections received.         [facility] to [facility] [place] [facility] nursing team.    Visit Diagnoses:  1. Chronic pain syndrome    2. Balance problem    3. Right foot drop    4. Right leg weakness    5. Neuropathic pain    6. Peripheral polyneuropathy    7. Myofascial pain    8. Chronic bilateral low back pain, unspecified whether sciatica present        Plan:  Diagnosis reviewed, treatment option addressed, and risk/benifits discussed.  Self-care instructions given.  I am recommending a multidisciplinary treatment plan to help this patient better manage their pain.      Physical Therapy:  YES     Pain PT with Otto Jauregui previously.   New referral for PT placed today, per patient request.   RLE lower leg atrophy, potentially from use of brace and chronic low back pain.     Pain Psychology: No     Diagnostic Studies:    No new orders today.      Medication Management:   Gabapentin 1200  mg TID and tizanidine 4 mg at bedtime PRN. PCP managing.   Topicals - Diclofenac gel and lidocaine cream   Medical cannabis/CBD - appreciates benefit, recent use of OTC products.      Potential procedures:   Caudal GREGOR - last completed on 1/2/24. Appreciates benefit. May repeat every 3+ months. Advised to contact clinic if interested in repeat injection.   Bilateral SI joint injections completed on 10/2/24 with Dr. Saldivar. Reports significant benefit for 5 weeks, then pain increased to an extent but still less than before injection. Will continue to monitor benefit and may consider repeat in future if appropriate.      Other Orders/Referrals:   None      Follow up with MERCEDES Pace CNP in 4-6 months or sooner if needed       Review of Electronic Chart: Today I have also reviewed available medical information in the patient's medical record at [facility] and Care Everywhere (if available), including relevant provider notes, laboratory work, and imaging.     Renée Harris DNP, MERCEDES, AGNP-C  [facility]     -------------------------------------------------    Subjective:    Chief complaint:   Chief Complaint   Patient presents with     RECHECK     Follow up 3 month       Interval history:  Nadira Perez is a 74 year old female last seen on 9/6/24.      Recommendations/plan at the last visit included:  Physical Therapy:  YES     Pain PT with Otto Jauregui previously.   Referral for PT to [facility]          RLE - concerns for lower leg atrophy, potentially from use of brace. Scheduling number is [phone].      Pain Psychology: No     Diagnostic Studies:    No new orders today.      Medication Management:   Gabapentin 1200 mg TID and tizanidine 4 mg at bedtime PRN. PCP managing.   Topicals - Diclofenac gel and lidocaine cream   Medical cannabis/CBD - appreciates benefit, recent use of OTC products.      Potential procedures:   Caudal GREGOR - last completed on 1/2/24. Appreciates  benefit. May repeat every 3+ months. Advised to contact clinic if interested in repeat injection.   Bilateral SI joint injections scheduled on 10/2 with Dr. Saldivar.      Other Orders/Referrals:   None      Follow up with MERCEDES Pace CNP in 3-6 months or sooner if needed     Since her last visit, Nadira Perez reports:  -Her brother recently . He had hydrocephalus related to concussions in the past. He had dementia. She went to [facility] for his  the day after [place]. She was able to get through airport okay with wheelchair.   -She is using cane and walker to help with balance.   -She continues lymphedema PT.  -She does piliates regularly, reports benefit for low back pain.   -She had SI joint injections on 10/2 with Dr. Saldivar. She reports significant benefit for 5 weeks, but then pain has started to gradually return.   -She continues to appreciate benefit with medications: gabapentin, tizanidine, and medical cannabis.   -She requests a new PT referral to continue working on RLE and also strength/conditioning.     PEG: A Three-Item Scale Assessing Pain Intensity and Interference    What number best describes your PAIN ON AVERAGE in the past week? (Patient-Rptd) 4    What number best describes how, during the past week, pain has interfered with your ENJOYMENT OF LIFE? (Patient-Rptd) 4    What number best describes how, during the past week, pain has interfered with your GENERAL ACTIVITY? (Patient-Rptd) 4    PEG Total Score: (Patient-Rptd) 4    Marin CROWE, Rocky KA, Maxwell MJ, Eden TA, Jarvis J, Ken JM, Pooja SM, Jewel K. Development and initial validation of the PEG, a 3-item scale assessing pain intensity and interference. Journal of General Internal Medicine. 2009 Charlie;24:733-738.        HPI/Interval history from last visit:  -She is working on weight loss right now.   -Bilateral SIJ injections scheduled on 10/2 with Dr. Saldivar. She states this is first time, has been asking for SI  "injections for a while.   -New Closed displaced fracture of neck of fifth metacarpal bone of right hand, saw Dr. Morgan in Sports Med on 7/10 for initial evaluation.   -She got cast off about 2 weeks ago, currently engaging in hand therapy.   -She completed sleep study since last visit, has follow up on 9/19/24.   -She also has concerns for lower RLE muscle atrophy, potentially   -She will consider sooner follow up with me after SI joint injections if not effective.   -She recently purchased sleep number bed.   -She was previously prescribing hydrocodone #10 tabs/month in the past, states that she felt like she was \"judged\" at one point when getting discharged from hospital.   -She is managing with current pain plan.        Current Pain Treatments:    Medications:                             Tizanidine 4 mg at bedtime  (PCP)              Gabapentin 1200 mg TID (PCP manages)              OTC cannabis/CBD     2. Other therapies:               Pain PT HEP              Piliates/HEP              Heat/Ice              Caudal GREGOR, SI joint injections         Current MME: N/A     Review of [place] Prescription Monitoring Program (): YES        Past pain treatments:  Hydrocodone 5-325 mg PRN (PCP managed), stopped due to concerns for stigma/judgement by health care providers. Could consider resuming occasional use.      Medications:  Current Outpatient Medications   Medication Sig Dispense Refill     acetaminophen (TYLENOL) 500 MG tablet Take 1,000 mg by mouth 3 times daily       artificial tears OINT ophthalmic ointment Place into both eyes nightly as needed for dry eyes.       Ascorbic Acid (VITAMIN C) 500 MG CHEW Take 1 tablet by mouth every morning       atorvastatin (LIPITOR) 80 MG tablet Take 1 tablet (80 mg) by mouth daily. 90 tablet 3     carvedilol (COREG) 12.5 MG tablet Take 1 tablet (12.5 mg) by mouth 2 times daily (with meals). 180 tablet 3     CRANBERRY EXTRACT PO Take 500 mg by mouth every morning       " diclofenac (VOLTAREN) 1 % topical gel Apply 2 g topically 4 times daily 100 g 6     gabapentin (NEURONTIN) 300 MG capsule Take 4 capsules (1,200 mg) by mouth 3 times daily. 1080 capsule 3     HEMP OIL OR EXTRACT OR OTHER CBD CANNABINOID, NOT MEDICAL CANNABIS, THC       hydrochlorothiazide (HYDRODIURIL) 25 MG tablet Take 1 tablet (25 mg) by mouth daily. 90 tablet 3     levothyroxine (SYNTHROID/LEVOTHROID) 112 MCG tablet TAKE 1/2 TAB BY MOUTH EVERY DAY 45 tablet 3     lisinopril (ZESTRIL) 20 MG tablet Take 1 tablet (20 mg) by mouth 2 times daily. 180 tablet 3     MAGNESIUM GLYCINATE PO Take 400 mg by mouth at bedtime.       Melatonin 10 MG TABS tablet Take 10 mg by mouth nightly as needed.       Multiple Vitamin (MULTI-VITAMIN) per tablet Take 1 tablet by mouth every morning       omeprazole (PRILOSEC) 20 MG DR capsule Take 1 capsule (20 mg) by mouth daily. 90 capsule 3     ondansetron (ZOFRAN) 4 MG tablet Take 1 tablet (4 mg) by mouth every 6 hours as needed for nausea. 12 tablet 3     ondansetron (ZOFRAN-ODT) 4 MG ODT tab Take 1 tablet (4 mg) by mouth every 12 hours as needed for nausea 180 tablet 3     spironolactone (ALDACTONE) 50 MG tablet Take 1 tablet (50 mg) by mouth daily at 2 pm. 90 tablet 3     tiZANidine (ZANAFLEX) 4 MG tablet Take 1 tablet (4 mg) by mouth at bedtime. (Patient taking differently: Take 4 mg by mouth nightly as needed for muscle spasms.) 90 tablet 3     Turmeric 500 MG CAPS Take 500 mg by mouth every morning         Medical History: any changes in medical history since they were last seen? No      Objective:    Physical Exam:  GENERAL: alert and no distress  EYES: Eyes grossly normal to inspection.  No discharge or erythema, or obvious scleral/conjunctival abnormalities.  RESP: No audible wheeze, cough, or visible cyanosis.    SKIN: Visible skin clear. No significant rash, abnormal pigmentation or lesions.  NEURO: Cranial nerves grossly intact.  Mentation and speech appropriate for  age.  PSYCH: Appropriate affect, tone, and pace of words     Diagnostic Tests/Imaging/Labs:  N/A    BILLING TIME DOCUMENTATION:   The total TIME spent on this patient on the date of the encounter/appointment was 30 minutes.      TOTAL TIME includes:   Time spent preparing to see the patient (reviewing records and tests)   Time spent face to face (or over the phone) with the patient   Time spent ordering tests, medications, procedures and referrals   Time spent Referring and communicating with other healthcare professionals   Time spent documenting clinical information in Epic       Again, thank you for allowing me to participate in the care of your patient.      Sincerely,    MERCEDES Pace CNP

## 2024-12-10 NOTE — PATIENT INSTRUCTIONS
New PT referral placed today to [facility] chronic low back pain and right leg weakness. Scheduling number is [phone].   Follow up in 4-6 months or sooner if needed.

## 2024-12-10 NOTE — NURSING NOTE
How will you be connecting to the visit? MyChart  How would you like to obtain your AVS? MyChart  If the video visit is dropped, the invitation should be resent by: Text to cell phone: [phone]  Will anyone else be joining your video visit? No  Are you currently in the [facility] yes        Patient presents with:  RECHECK: Follow up 3 month      Moderate Pain (4)     Pain Medications       Analgesics Other Refills Start End     acetaminophen (TYLENOL) 500 MG tablet --  --    Sig - Route: Take 1,000 mg by mouth 3 times daily - Oral    Class: Historical          What medications are you using for pain? Tylenol, gabapentin, THC to sleep      What refills are you needing today? Not sure    Expectations: follow up    Evie Gardner LPN

## 2024-12-11 ENCOUNTER — TELEPHONE (OUTPATIENT)
Dept: ANESTHESIOLOGY | Facility: CLINIC | Age: 74
End: 2024-12-11
Payer: COMMERCIAL

## 2024-12-11 NOTE — TELEPHONE ENCOUNTER
Left Voicemail (1st Attempt) and Sent Mychart (1st Attempt) for the patient to call back and schedule the following:    Appointment type: Return Pain  Provider: Renée Harris  Visit mode: In Person or Virtual Visit  Return date: Approx. 4/10/25  Specialty phone number: [phone]    Additional Notes:

## 2024-12-12 NOTE — TELEPHONE ENCOUNTER
Was able to get patient scheduled for 1/7/2025, but nothing sooner. Patient notified of this by private voicemail.Patricia Osborne, CMA

## 2024-12-21 ENCOUNTER — HEALTH MAINTENANCE LETTER (OUTPATIENT)
Age: 74
End: 2024-12-21

## 2024-12-30 ENCOUNTER — DOCUMENTATION ONLY (OUTPATIENT)
Dept: AUDIOLOGY | Facility: CLINIC | Age: 74
End: 2024-12-30
Payer: COMMERCIAL

## 2024-12-30 DIAGNOSIS — H90.3 SENSORINEURAL HEARING LOSS, BILATERAL: Primary | ICD-10-CM

## 2024-12-31 NOTE — PROGRESS NOTES
Walk-in hearing aid services on 12/30/24: The patient reported something was wrong with her right hearing aid. Examination found the  wire was broken. Left and right hearing aids were cleaned and the right  (#1-S) was replaced. Left  filter and dome were replaced as well. A listening check subsequently found both hearing aids to be working properly and they were returned to the patient. She is being billed $100.00 today for the replacement right .    Richard Ruffin  Audiology Clinic Assistant        I reviewed the procedures/testing performed by the audiology assistant, and agree with the assessment and plan as documented.      Rik Arauz  Audiologist  [place] License  #[id number]

## 2025-01-01 NOTE — TELEPHONE ENCOUNTER
Left message regarding scheduling surgery/procedure with Dr. Agudelo. Writer left call back number on the patients voicemail.      Caitlyn Trejo on 5/28/2024 at 10:23 AM   P: [phone]    
Patient called back to schedule surgery with Dr. Agudelo. Pt was offered first available.     Date of Surgery: 7/25/2024    Approximate arrival time given:  Yes - mid morning, pt will check with her sister in law to ensure she can bring her home    Location of surgery: [facility]     Pre-Op H&P: PAC scheduled 7/9/2024 at 130pm. Knows arrival time and instructions will be given at the appointment.     Post-Op Appt Date: 1 week post op scheduled with Dr. Agudelo on 7/31 at 100pm, pt aware       Packet sent out: Yes via [employer] 05/28/24    Additional Comments: All patients questions were answered and was instructed to review surgical packet and call back with any questions or concerns.       Caitlyn Trejo on 5/28/2024 at 11:28 AM    
Alert-The patient is alert, awake and responds to voice. The patient is oriented to time, place, and person. The triage nurse is able to obtain subjective information.

## 2025-01-06 ENCOUNTER — VIRTUAL VISIT (OUTPATIENT)
Dept: CARDIOLOGY | Facility: CLINIC | Age: 75
End: 2025-01-06
Attending: INTERNAL MEDICINE
Payer: COMMERCIAL

## 2025-01-06 VITALS
HEIGHT: 65 IN | DIASTOLIC BLOOD PRESSURE: 95 MMHG | SYSTOLIC BLOOD PRESSURE: 129 MMHG | BODY MASS INDEX: 28.66 KG/M2 | WEIGHT: 172 LBS

## 2025-01-06 DIAGNOSIS — I10 BENIGN ESSENTIAL HYPERTENSION: ICD-10-CM

## 2025-01-06 DIAGNOSIS — Z85.3 HX: BREAST CANCER: ICD-10-CM

## 2025-01-06 ASSESSMENT — SLEEP AND FATIGUE QUESTIONNAIRES
HOW LIKELY ARE YOU TO NOD OFF OR FALL ASLEEP WHILE LYING DOWN TO REST IN THE AFTERNOON WHEN CIRCUMSTANCES PERMIT: MODERATE CHANCE OF DOZING
HOW LIKELY ARE YOU TO NOD OFF OR FALL ASLEEP WHILE SITTING INACTIVE IN A PUBLIC PLACE: WOULD NEVER DOZE
HOW LIKELY ARE YOU TO NOD OFF OR FALL ASLEEP WHILE SITTING QUIETLY AFTER LUNCH WITHOUT ALCOHOL: SLIGHT CHANCE OF DOZING
HOW LIKELY ARE YOU TO NOD OFF OR FALL ASLEEP WHILE WATCHING TV: SLIGHT CHANCE OF DOZING
HOW LIKELY ARE YOU TO NOD OFF OR FALL ASLEEP WHILE SITTING AND READING: SLIGHT CHANCE OF DOZING
HOW LIKELY ARE YOU TO NOD OFF OR FALL ASLEEP WHEN YOU ARE A PASSENGER IN A CAR FOR AN HOUR WITHOUT A BREAK: SLIGHT CHANCE OF DOZING
HOW LIKELY ARE YOU TO NOD OFF OR FALL ASLEEP WHILE SITTING AND TALKING TO SOMEONE: WOULD NEVER DOZE
HOW LIKELY ARE YOU TO NOD OFF OR FALL ASLEEP IN A CAR, WHILE STOPPED FOR A FEW MINUTES IN TRAFFIC: WOULD NEVER DOZE

## 2025-01-06 ASSESSMENT — PAIN SCALES - GENERAL: PAINLEVEL_OUTOF10: MILD PAIN (3)

## 2025-01-06 NOTE — PROGRESS NOTES
I had the pleasure of participating in coordination of clinical cardiovascular care for patient, Nadira Perez, via  [facility]'s virtual visit protocol.    HISTORY:    Ms. 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  Treated with neoadjuvant chemo (anthracycline), bilateral mastectomy (residual disease), radiation, and 14 years of endocrine therapy.   2. PVCs  3. History of stress cardiomyopathy with LVEF down to 35% on the day of mechanical fall 2/16/2024, now improved to LVEF 50 to 55%.  4. Hypertension  5. Dyslipidemia    Patient is a retired critical care nurse  She is now on sleep apnea treatment with CPAP and BP is much improved.   She is taking carvedilol, hydrochlorothiazide, spironolactone and losartan. Amlodipine was discontinued due to edema. SBP at home 120s mmHg but DBP 90s. She denies chest pain, dyspnea at rest, palpitations or syncope.       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    Corneal opacity    Hypothyroidism    Osteopenia, unspecified location    Aromatase inhibitor use    Chronic pain of right knee    DDD (degenerative disc disease), lumbar    Degenerative scoliosis in adult patient    Carpal tunnel syndrome of right wrist    Peripheral neuropathy    Spinal stenosis of lumbar region  with neurogenic claudication    Spondylolisthesis of lumbar region    Brain lesion    Dermatochalasis of both upper eyelids    S/P spinal fusion    Chronic bilateral low back pain    PVD (posterior vitreous detachment), left eye    Vitreous opacities of left eye    Closed nondisplaced fracture of lateral malleolus of left fibula, initial encounter    Acute left ankle pain    Sacroiliitis (H)    Lumbar radiculopathy    Diabetes mellitus, type 2 (H)    Abnormal electrocardiogram    Ejection fraction < 50%    Closed fracture of sternum, unspecified portion of sternum, initial encounter    Congenital abnormality of ear    Sacroiliac joint pain    Impairment of balance    Constricted ear, right        FAMILY HISTORY:  Mother - hemorrhagic CVA  Father AAA  No premature CAD or sudden death    SOCIAL HISTORY:  Social History     Tobacco Use    Smoking status: Never     Passive exposure: Never    Smokeless tobacco: Never   Vaping Use    Vaping status: Never Used   Substance Use Topics    Alcohol use: Yes     Alcohol/week: 7.0 standard drinks of alcohol     Types: 7 Glasses of wine per week     Comment: Rare if ever    Drug use: Yes     Types: Marijuana     Comment: Smoke through water filter for chronic back pain PRN.        CURRENT MEDICATIONS:  Current Outpatient Medications   Medication Sig Dispense Refill    acetaminophen (TYLENOL) 500 MG tablet Take 1,000 mg by mouth 3 times daily      artificial tears OINT ophthalmic ointment Place into both eyes nightly as needed for dry eyes.      Ascorbic Acid (VITAMIN C) 500 MG CHEW Take 1 tablet by mouth every morning      atorvastatin (LIPITOR) 80 MG tablet Take 1 tablet (80 mg) by mouth daily. 90 tablet 3    carvedilol (COREG) 12.5 MG tablet Take 1 tablet (12.5 mg) by mouth 2 times daily (with meals). 180 tablet 3    CRANBERRY EXTRACT PO Take 500 mg by mouth every morning      diclofenac (VOLTAREN) 1 % topical gel Apply 2 g topically 4 times daily 100 g 6    gabapentin  (NEURONTIN) 300 MG capsule Take 4 capsules (1,200 mg) by mouth 3 times daily. 1080 capsule 3    HEMP OIL OR EXTRACT OR OTHER CBD CANNABINOID, NOT MEDICAL CANNABIS, THC      hydrochlorothiazide (HYDRODIURIL) 25 MG tablet Take 1 tablet (25 mg) by mouth daily. 90 tablet 3    levothyroxine (SYNTHROID/LEVOTHROID) 112 MCG tablet TAKE 1/2 TAB BY MOUTH EVERY DAY 45 tablet 3    lisinopril (ZESTRIL) 20 MG tablet Take 1 tablet (20 mg) by mouth 2 times daily. 180 tablet 3    MAGNESIUM GLYCINATE PO Take 400 mg by mouth at bedtime.      Melatonin 10 MG TABS tablet Take 10 mg by mouth nightly as needed.      Multiple Vitamin (MULTI-VITAMIN) per tablet Take 1 tablet by mouth every morning      omeprazole (PRILOSEC) 20 MG DR capsule Take 1 capsule (20 mg) by mouth daily. 90 capsule 3    ondansetron (ZOFRAN) 4 MG tablet Take 1 tablet (4 mg) by mouth every 6 hours as needed for nausea. 12 tablet 3    ondansetron (ZOFRAN-ODT) 4 MG ODT tab Take 1 tablet (4 mg) by mouth every 12 hours as needed for nausea 180 tablet 3    spironolactone (ALDACTONE) 50 MG tablet Take 1 tablet (50 mg) by mouth daily at 2 pm. 90 tablet 3    tiZANidine (ZANAFLEX) 4 MG tablet Take 1 tablet (4 mg) by mouth at bedtime. (Patient taking differently: Take 4 mg by mouth nightly as needed for muscle spasms.) 90 tablet 3    Turmeric 500 MG CAPS Take 500 mg by mouth every morning         Exam: limited due to the nature of this visit   In general, the patient is in no acute distress.    Breathing is unlabored.     I have independently reviewed this patient's relevant laboratory and cardiac data :    Recent Labs   Lab Test 03/07/24  1451 04/15/22  1632   CHOL 214* 167   HDL 56 67   * 71   TRIG 237* 145      Recent Labs   Lab Test 11/30/23  1218 10/27/22  1428   AST 25 22     Recent Labs   Lab Test 11/30/23  1218 10/27/22  1428   ALT 21 16     Recent Labs   Lab Test 05/03/24  1143 04/05/24  1159    141   POTASSIUM 3.8 4.0   CHLORIDE 101 103   CO2 26 24    ANIONGAP 12 14   BUN 24.1* 33.4*   CR 0.89 0.90     Recent Labs   Lab Test 02/17/24  0640 02/16/24  1005   WBC 10.3 9.8   RBC 4.08 4.23   HGB 11.9 12.3   MCV 91 93    306     Recent Labs   Lab Test 03/07/24  1451 11/30/23  1218   A1C 6.2* 6.5*     Recent Labs   Lab Test 12/04/23  1642 11/30/23  1218   TSH 2.03 2.07     ECG September 2024 - sinus rhyhtm    CMR: 4/11/24  CONCLUSIONS:   1. Mildly reduced LV systolic function with LVEF of 50%.  2. Late gadolinium enhancement imaging shows focal area of mid inferolateral wall subendocardial delayed enhancement that could represent a small size infarct.    TTE: 4/2/24  Interpretation Summary  Left ventricular function is decreased. The ejection fraction is 50-55%  (borderline). Mid inferolateral akinesis is present.  Global right ventricular function is normal.  No pericardial effusion is present.    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 LCx infarction     Overall, she is doing well without angina or heart failure. She has findings of a small inferolateral infarction but since she is not symptomatic, coronary CTA/invasive angiogram was deferred. She is on a statin and is on antihypertensives. Due to her significant lower extremity edema and the gingival hyperplasia, the amlodipine was stopped. She feels much improved with CPAP for sleep apnea. She will have her BP checked tomorrow. Will continue current medications for now.      Recommendations are -   Continue carvedilol 12.5 mg bid   Continue Lisinopril 20 mg bid  Continue Hydrochlorothiazide 25mg daily   Continue  Atorvastatin 80mg daily   Continue Spironolactone to 50 mg daily   Follow up with Dr Rascon in 6 months    Virtual Visit Details    Type of service:  Telephone Visit   Phone call duration: 15 minutes   Originating Location (pt. Location): Home    Distant Location (provider location):  Off-site  Telephone visit completed due to the patient did not have access to video, while the distant provider did.      Cinthia Rascon MD, MS  Professor of Medicine  Cardiovascular Medicine

## 2025-01-06 NOTE — NURSING NOTE
Current patient location: [address] [facility] [address]    Is the patient currently in the state of [place]? YES    Visit mode:TELEPHONE    If the visit is dropped, the patient can be reconnected by:TELEPHONE VISIT: Phone number:   Telephone Information:   Mobile [phone]       Will anyone else be joining the visit? NO  (If patient encounters technical issues they should call [phone]    Are changes needed to the allergy or medication list? No    Patient denies any changes since echeck-in completion and states all information entered during echeck-in remains accurate.    Are refills needed on medications prescribed by this physician? Discuss with provider, discuss Carvedilol per pt     Rooming Documentation:  Questionnaire(s) completed    Reason for visit: LEILA Lezama MA VVF

## 2025-01-06 NOTE — PATIENT INSTRUCTIONS
Plan:    Continue carvedilol 12.5 mg bid   Continue Lisinopril 20 mg bid  Continue Hydrochlorothiazide 25mg daily   Continue Atorvastatin 80mg daily   Continue Spironolactone to 50 mg daily   Follow up with Dr Rascon in 6 months      Your Care Team:         Cardiology   Telephone Number     Branden García RN [phone]    After business hours: [phone], ask for cardiologist on-call               Cardiovascular Clinic:   [address] [place] [address]. [place] [address]      As always, thank you for trusting us with your health care needs!

## 2025-01-06 NOTE — LETTER
1/6/2025      RE: Nadira Perez  [address]  [facility] [id number]       Dear Colleague,    Thank you for the opportunity to participate in the care of your patient, Nadira Perez, at the [facility] [place] at [facility]. Please see a copy of my visit note below.    I had the pleasure of participating in coordination of clinical cardiovascular care for patient, Nadira Perez, via [facility]'s virtual visit protocol.    HISTORY:    Ms. Nadira Perez is a pleasant 74 year old female with a medical history notable for    Left-sided stage IIIC inflammatory breast cancer, ER/CO positive, HER2 negative, diagnosed in 2007.  Treated with neoadjuvant chemo (anthracycline), bilateral mastectomy (residual disease), radiation, and 14 years of endocrine therapy.   2. PVCs  3. History of stress cardiomyopathy with LVEF down to 35% on the day of mechanical fall 2/16/2024, now improved to LVEF 50 to 55%.  4. Hypertension  5. Dyslipidemia    Patient is a retired critical care nurse  She is now on sleep apnea treatment with CPAP and BP is much improved.   She is taking carvedilol, hydrochlorothiazide, spironolactone and losartan. Amlodipine was discontinued due to edema. SBP at home 120s mmHg but DBP 90s. She denies chest pain, dyspnea at rest, palpitations or syncope.       PAST MEDICAL HISTORY:  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     Hypopotassemia     Hyperlipidemia     Murmurs     Nephrolithiasis     Osteoarthrosis, hand     Personal history of other malignant neoplasm of skin     Inflamed seborrheic keratosis     Essential hypertension, benign     Age-related osteoporosis without current pathological fracture     Mechanical problems with limbs     Hypovitaminosis D     Contact dermatitis and other eczema, due to unspecified cause     Dermatitis      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     Corneal opacity     Hypothyroidism     Osteopenia, unspecified location     Aromatase inhibitor use     Chronic pain of right knee     DDD (degenerative disc disease), lumbar     Degenerative scoliosis in adult patient     Carpal tunnel syndrome of right wrist     Peripheral neuropathy     Spinal stenosis of lumbar region with neurogenic claudication     Spondylolisthesis of lumbar region     Brain lesion     Dermatochalasis of both upper eyelids     S/P spinal fusion     Chronic bilateral low back pain     PVD (posterior vitreous detachment), left eye     Vitreous opacities of left eye     Closed nondisplaced fracture of lateral malleolus of left fibula, initial encounter     Acute left ankle pain     Sacroiliitis (H)     Lumbar radiculopathy     Diabetes mellitus, type 2 (H)     Abnormal electrocardiogram     Ejection fraction < 50%     Closed fracture of sternum, unspecified portion of sternum, initial encounter     Congenital abnormality of ear     Sacroiliac joint pain     Impairment of balance     Constricted ear, right        FAMILY HISTORY:  Mother - hemorrhagic CVA  Father AAA  No premature CAD or sudden death    SOCIAL HISTORY:  Social History     Tobacco Use     Smoking status: Never     Passive exposure: Never     Smokeless tobacco: Never   Vaping Use     Vaping status: Never Used   Substance Use Topics     Alcohol use: Yes     Alcohol/week: 7.0 standard drinks of alcohol     Types: 7 Glasses of wine per week     Comment: Rare if ever     Drug use: Yes     Types: Marijuana     Comment: Smoke through water filter for chronic back pain PRN.        CURRENT MEDICATIONS:  Current Outpatient Medications   Medication Sig Dispense Refill     acetaminophen (TYLENOL) 500 MG tablet Take 1,000 mg by mouth 3 times daily       artificial tears OINT ophthalmic ointment Place into both eyes nightly as needed for dry eyes.       Ascorbic Acid (VITAMIN C) 500 MG CHEW  Take 1 tablet by mouth every morning       atorvastatin (LIPITOR) 80 MG tablet Take 1 tablet (80 mg) by mouth daily. 90 tablet 3     carvedilol (COREG) 12.5 MG tablet Take 1 tablet (12.5 mg) by mouth 2 times daily (with meals). 180 tablet 3     CRANBERRY EXTRACT PO Take 500 mg by mouth every morning       diclofenac (VOLTAREN) 1 % topical gel Apply 2 g topically 4 times daily 100 g 6     gabapentin (NEURONTIN) 300 MG capsule Take 4 capsules (1,200 mg) by mouth 3 times daily. 1080 capsule 3     HEMP OIL OR EXTRACT OR OTHER CBD CANNABINOID, NOT MEDICAL CANNABIS, THC       hydrochlorothiazide (HYDRODIURIL) 25 MG tablet Take 1 tablet (25 mg) by mouth daily. 90 tablet 3     levothyroxine (SYNTHROID/LEVOTHROID) 112 MCG tablet TAKE 1/2 TAB BY MOUTH EVERY DAY 45 tablet 3     lisinopril (ZESTRIL) 20 MG tablet Take 1 tablet (20 mg) by mouth 2 times daily. 180 tablet 3     MAGNESIUM GLYCINATE PO Take 400 mg by mouth at bedtime.       Melatonin 10 MG TABS tablet Take 10 mg by mouth nightly as needed.       Multiple Vitamin (MULTI-VITAMIN) per tablet Take 1 tablet by mouth every morning       omeprazole (PRILOSEC) 20 MG DR capsule Take 1 capsule (20 mg) by mouth daily. 90 capsule 3     ondansetron (ZOFRAN) 4 MG tablet Take 1 tablet (4 mg) by mouth every 6 hours as needed for nausea. 12 tablet 3     ondansetron (ZOFRAN-ODT) 4 MG ODT tab Take 1 tablet (4 mg) by mouth every 12 hours as needed for nausea 180 tablet 3     spironolactone (ALDACTONE) 50 MG tablet Take 1 tablet (50 mg) by mouth daily at 2 pm. 90 tablet 3     tiZANidine (ZANAFLEX) 4 MG tablet Take 1 tablet (4 mg) by mouth at bedtime. (Patient taking differently: Take 4 mg by mouth nightly as needed for muscle spasms.) 90 tablet 3     Turmeric 500 MG CAPS Take 500 mg by mouth every morning         Exam: limited due to the nature of this visit   In general, the patient is in no acute distress.    Breathing is unlabored.     I have independently reviewed this patient's  relevant laboratory and cardiac data :    Recent Labs   Lab Test 03/07/24  1451 04/15/22  1632   CHOL 214* 167   HDL 56 67   * 71   TRIG 237* 145      Recent Labs   Lab Test 11/30/23  1218 10/27/22  1428   AST 25 22     Recent Labs   Lab Test 11/30/23  1218 10/27/22  1428   ALT 21 16     Recent Labs   Lab Test 05/03/24  1143 04/05/24  1159    141   POTASSIUM 3.8 4.0   CHLORIDE 101 103   CO2 26 24   ANIONGAP 12 14   BUN 24.1* 33.4*   CR 0.89 0.90     Recent Labs   Lab Test 02/17/24  0640 02/16/24  1005   WBC 10.3 9.8   RBC 4.08 4.23   HGB 11.9 12.3   MCV 91 93    306     Recent Labs   Lab Test 03/07/24  1451 11/30/23  1218   A1C 6.2* 6.5*     Recent Labs   Lab Test 12/04/23  1642 11/30/23  1218   TSH 2.03 2.07     ECG September 2024 - sinus rhyhtm    CMR: 4/11/24  CONCLUSIONS:   1. Mildly reduced LV systolic function with LVEF of 50%.  2. Late gadolinium enhancement imaging shows focal area of mid inferolateral wall subendocardial delayed enhancement that could represent a small size infarct.    TTE: 4/2/24  Interpretation Summary  Left ventricular function is decreased. The ejection fraction is 50-55%  (borderline). Mid inferolateral akinesis is present.  Global right ventricular function is normal.  No pericardial effusion is present.    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 LCx infarction     Overall, she is doing well without angina or heart failure. She has findings of a small inferolateral infarction but since she is not symptomatic, coronary CTA/invasive angiogram was deferred. She is on a statin and  is on antihypertensives. Due to her significant lower extremity edema and the gingival hyperplasia, the amlodipine was stopped. She feels much improved with CPAP for sleep apnea. She will have her BP checked tomorrow. Will continue current medications for now.      Recommendations are -   Continue carvedilol 12.5 mg bid   Continue Lisinopril 20 mg bid  Continue Hydrochlorothiazide 25mg daily   Continue Atorvastatin 80mg daily   Continue Spironolactone to 50 mg daily   Follow up with Dr Rascon in 6 months    Virtual Visit Details    Type of service:  Telephone Visit   Phone call duration: 15 minutes   Originating Location (pt. Location): Home    Distant Location (provider location):  Off-site  Telephone visit completed due to the patient did not have access to video, while the distant provider did.      Cinthia Rascon MD, MS  Professor of Medicine  Cardiovascular Medicine            Please do not hesitate to contact me if you have any questions/concerns.     Sincerely,     Cinthia Rascon MD

## 2025-01-07 ENCOUNTER — OFFICE VISIT (OUTPATIENT)
Dept: SLEEP MEDICINE | Facility: CLINIC | Age: 75
End: 2025-01-07
Payer: COMMERCIAL

## 2025-01-07 ENCOUNTER — TELEPHONE (OUTPATIENT)
Dept: ORTHOPEDICS | Facility: CLINIC | Age: 75
End: 2025-01-07

## 2025-01-07 VITALS
WEIGHT: 173 LBS | OXYGEN SATURATION: 97 % | DIASTOLIC BLOOD PRESSURE: 70 MMHG | SYSTOLIC BLOOD PRESSURE: 115 MMHG | HEART RATE: 70 BPM | BODY MASS INDEX: 28.82 KG/M2 | HEIGHT: 65 IN

## 2025-01-07 DIAGNOSIS — M81.0 AGE-RELATED OSTEOPOROSIS WITHOUT CURRENT PATHOLOGICAL FRACTURE: Primary | ICD-10-CM

## 2025-01-07 DIAGNOSIS — G47.36 HYPOXEMIA ASSOCIATED WITH SLEEP: ICD-10-CM

## 2025-01-07 DIAGNOSIS — M80.08XG PATHOLOGICAL FRACTURE OF VERTEBRA DUE TO AGE-RELATED OSTEOPOROSIS WITH DELAYED HEALING, SUBSEQUENT ENCOUNTER: ICD-10-CM

## 2025-01-07 DIAGNOSIS — G47.33 OSA (OBSTRUCTIVE SLEEP APNEA): Primary | ICD-10-CM

## 2025-01-07 PROCEDURE — 99214 OFFICE O/P EST MOD 30 MIN: CPT | Performed by: INTERNAL MEDICINE

## 2025-01-07 NOTE — TELEPHONE ENCOUNTER
Has Patient gotten her Reclast?  I thought she would have a repeat DXA in 2024?    Last one I see in her chart is 2023    She completed her Evenity but still needs IV Reclast

## 2025-01-07 NOTE — PROGRESS NOTES
[facility]  Sleep clinic-Obstructive Sleep Apnea - PAP Follow-Up Visit note       Date: 1/7/2025    Chief Complaint   Patient presents with    CPAP Follow Up     CPAP follow up       Nadira Perez comes in today for follow-up of their moderate sleep apnea, managed with CPAP.     Nadira Perez was set up at [facility] on September 24, 2024. Patient received a Resmed Airsense 10 Pressures were set at  5-15 cm H2O.   Patient s ramp is 5 cm H2O for Auto and FLEX/EPR is EPR, 2. Patient received a Resmed Mask name: Airfit P10 Pillow mask size Medium, Standard, heated tubing and heated humidifier. Patient has the following compliance requirements: using and visit requirements.    She started using CPAP since 2nd week in Oct 2024.    Do you use a CPAP Machine at home: (Patient-Rptd) Yes  Overall, on a scale of 0-10 how would you rate your CPAP (0 poor, 10 great): (Patient-Rptd) 8  Is your mask comfortable: (Patient-Rptd) Yes  If not, why:    Is you mask leaking: (Patient-Rptd) No  If yes, where do you feel it:    How many night per week does the mask leak (0-7):    Do you notice snoring with mask on: (Patient-Rptd) No  Do you notice gasping arousals with mask on: (Patient-Rptd) No  Are you having significant oral or nasal dryness: (Patient-Rptd) No  Is the pressure setting comfortable: (Patient-Rptd) Yes  In not, why:    What type of mask do you use: (Patient-Rptd) Nasal Pillow  What is your typical bedtime: (Patient-Rptd) 2400  How long does it take you to go to sleep on PAP therapy: (Patient-Rptd) 30-45 minutes  What time do you typically get out of bed for the day: (Patient-Rptd) 0900  How many hours on average per night are you using PAP therapy: (Patient-Rptd) 4-6  How many hours are you sleeping per night: (Patient-Rptd) 5-6  Do you feel well rested in the morning: (Patient-Rptd) Yes    EPWORTH SLEEPINESS SCALE         1/6/2025    11:21 PM    [place] Sleepiness Scale ( [employer]   [id number]<br>ESS - [place]/English - Final version - 21 Nov 07 - [facility] [place].)   Sitting and reading Slight chance of dozing   Watching TV Slight chance of dozing   Sitting, inactive in a public place (e.g. a theatre or a meeting) Would never doze   As a passenger in a car for an hour without a break Slight chance of dozing   Lying down to rest in the afternoon when circumstances permit Moderate chance of dozing   Sitting and talking to someone Would never doze   Sitting quietly after a lunch without alcohol Slight chance of dozing   In a car, while stopped for a few minutes in traffic Would never doze   [place]ord Score (MC) 6   [place]ord Score (Sleep) 6        Patient-reported         INSOMNIA SEVERITY INDEX (ASHLEY)          1/6/2025    11:14 PM   Insomnia Severity Index (ASHLEY)   Difficulty falling asleep 1   Difficulty staying asleep 2   Problems waking up too early 1   How SATISFIED/DISSATISFIED are you with your CURRENT sleep pattern? 2   How NOTICEABLE to others do you think your sleep problem is in terms of impairing the quality of your life? 0   How WORRIED/DISTRESSED are you about your current sleep problem? 2   To what extent do you consider your sleep problem to INTERFERE with your daily functioning (e.g. daytime fatigue, mood, ability to function at work/daily chores, concentration, memory, mood, etc.) CURRENTLY? 0   ASHLEY Total Score 8        Patient-reported         Guidelines for Scoring/Interpretation:  Total score categories:  0-7 = No clinically significant insomnia   8-14 = Subthreshold insomnia   15-21 = Clinical insomnia (moderate severity)  22-28 = Clinical insomnia (severe)  Used via courtesy of www.AirWatchth.va.gov with permission from Hosae Lawrence PhD., [facility]    ResMed   Auto-PAP 5.0 - 15.0 cmH2O 30 day usage data:    97% of days with > 4 hours of use. 0/30 days with no use.   Average use 5 hours and 51 minutes per day.   95%ile Leak 35.4 L/min.   CPAP 95% pressure 10.3 cm  water  AHI 5.5 events per hour(obstructive apnea index: 0.7/h; central apnea index: 2.6/h; hypopnea index: 0.4/h unknown: 1.7/h)      Past medical/surgical history, family history, social history, medications and allergies were reviewed.         Problem List:  Patient Active Problem List    Diagnosis Date Noted    Constricted ear, right 10/16/2024     Priority: Medium    Impairment of balance 09/30/2024     Priority: Medium    Sacroiliac joint pain 08/22/2024     Priority: Medium    Congenital abnormality of ear 05/22/2024     Priority: Medium    Abnormal electrocardiogram 02/16/2024     Priority: Medium    Ejection fraction < 50% 02/16/2024     Priority: Medium    Closed fracture of sternum, unspecified portion of sternum, initial encounter 02/16/2024     Priority: Medium    Diabetes mellitus, type 2 (H) 12/19/2023     Priority: Medium    Lumbar radiculopathy 05/19/2023     Priority: Medium     Added automatically from request for surgery [id number]      Sacroiliitis (H) 04/20/2023     Priority: Medium     Added automatically from request for surgery [id number]      Closed nondisplaced fracture of lateral malleolus of left fibula, initial encounter 01/27/2023     Priority: Medium    Acute left ankle pain 01/27/2023     Priority: Medium    PVD (posterior vitreous detachment), left eye 05/09/2022     Priority: Medium     Added automatically from request for surgery [id number]      Vitreous opacities of left eye 05/09/2022     Priority: Medium     Added automatically from request for surgery [id number]      S/P spinal fusion 05/04/2022     Priority: Medium    Chronic bilateral low back pain 05/04/2022     Priority: Medium    Dermatochalasis of both upper eyelids 04/11/2022     Priority: Medium     Added automatically from request for surgery [id number]      Brain lesion 12/16/2021     Priority: Medium    Spinal stenosis of lumbar region with neurogenic claudication 11/18/2021     Priority: Medium    Spondylolisthesis of lumbar  region 11/18/2021     Priority: Medium    Peripheral neuropathy 10/27/2021     Priority: Medium    Carpal tunnel syndrome of right wrist 10/21/2021     Priority: Medium     Added automatically from request for surgery [id number]      Degenerative scoliosis in adult patient 01/28/2020     Priority: Medium    DDD (degenerative disc disease), lumbar 10/22/2019     Priority: Medium    Chronic pain of right knee 12/27/2018     Priority: Medium    Osteopenia, unspecified location 11/21/2017     Priority: Medium    Aromatase inhibitor use 11/21/2017     Priority: Medium    Infiltrating ductal ca grade 2, ERpositive, PRpositive, HER2 negative by FISH 07/20/2016     Priority: Medium    Hypothyroidism 11/09/2014     Priority: Medium    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 Provider to review      Dermatitis 11/03/2013     Priority: Medium    Hypovitaminosis D 07/16/2013     Priority: Medium    Contact dermatitis and other eczema, due to unspecified cause 07/16/2013     Priority: Medium    Mechanical problems with limbs 03/25/2013     Priority: Medium    Age-related osteoporosis without current pathological fracture 03/01/2013     Priority: Medium    Essential hypertension, benign 02/03/2013     Priority: Medium    Inflamed seborrheic keratosis 01/29/2013     Priority: Medium    Personal history of other malignant neoplasm of skin 12/23/2011     Priority: Medium    Hypopotassemia 11/08/2011     Priority: Medium    Hyperlipidemia 11/08/2011     Priority: Medium     Problem list name updated by automated process. Provider to review      Murmurs 11/08/2011     Priority: Medium    Nephrolithiasis 11/08/2011     Priority: Medium    Osteoarthrosis, hand 11/08/2011     Priority: Medium     Problem list name updated by automated process. Provider to review                 Physical Examination:  "  Ht 1.651 m (5' 5\")   Wt 78.5 kg (173 lb)   LMP  (LMP Unknown)   BMI 28.79 kg/m    General: Pleasant. Cooperative. In no apparent distress.  Pulmonary: Able to speak in full sentences easily. No cough or wheeze.   Neurologic: Alert, oriented x3.  Psychiatric: Mood euthymic. Affect congruent with full range and intensity.     ASSESSMENT/PLAN:  Obstructive sleep apnea: Pt reports adequate compliance with CPAP and based on compliance measures, LOLY is adequately controlled with CPAP at the current settings.    DME orders were generated for renewal of CPAP supplies.  Recommended to continue using the CPAP regularly during sleep and getting the supplies for the CPAP replaced regularly.   Patient instructed to remember to bring PAP with her if hospitalized and if anticipating procedure that requires sedation/surgery to be sure to discuss with the provider/surgeon that she has sleep apnea and uses PAP therapy.    Sleep associated hypoxemia: Recommended obtaining an overnight oximetry with the auto CPAP device at the current pressure settings to check for resolution of the hypoxemia. Orders for overnight oximetry were generated during her previous sleep clinic visit and do not  till 2025. Overnight oximetry results will be communicated with patient via MyChart     Encouraged to continue following healthy diet and exercise as tolerated.     Patient was strongly advised to avoid driving, operating any heavy machinery or other hazardous situations while drowsy or sleepy.  Patient was counseled on the importance of driving while alert, to pull over if drowsy, or nap before getting into the vehicle if sleepy.      If the overnight oximetry shows resolution of hypoxemia, she will follow-up with sleep clinic  in 1 year or sooner if any concerns.     The above note was dictated using voice recognition software. Although reviewed after completion, some word and grammatical error may remain . Please contact the author " "for any clarifications.      \" Total time spent was 30 minutes  for this appointment on this date of service which include time spent before, during and after the visit for chart review, patient care, counseling and coordination of care. Including documentation\"      Nathan Marquez MD  [facility]  [address], [address], [place] [id number]       "

## 2025-01-07 NOTE — TELEPHONE ENCOUNTER
Placed orders for DXA  If insurance doesn't pay can get DXA in 6/2026    Next Relcast 7/2025 and labs are drawn prior to administering infusion

## 2025-01-07 NOTE — PATIENT INSTRUCTIONS
Your Body mass index is 28.79 kg/m .  Weight management is a personal decision.  If you are interested in exploring weight loss strategies, the following discussion covers the approaches that may be successful. Body mass index (BMI) is one way to tell whether you are at a healthy weight, overweight, or obese. It measures your weight in relation to your height.  A BMI of 18.5 to 24.9 is in the healthy range. A person with a BMI of 25 to 29.9 is considered overweight, and someone with a BMI of 30 or greater is considered obese. More than two-thirds of American adults are considered overweight or obese.  Being overweight or obese increases the risk for further weight gain. Excess weight may lead to heart disease and diabetes.  Creating and following plans for healthy eating and physical activity may help you improve your health.  Weight control is part of healthy lifestyle and includes exercise, emotional health, and healthy eating habits. Careful eating habits lifelong are the mainstay of weight control. Though there are significant health benefits from weight loss, long-term weight loss with diet alone may be very difficult to achieve- studies show long-term success with dietary management in less than 10% of people. Attaining a healthy weight may be especially difficult to achieve in those with severe obesity. In some cases, medications, devices and surgical management might be considered.  What can you do?  If you are overweight or obese and are interested in methods for weight loss, you should discuss this with your provider.   Consider reducing daily calorie intake by 500 calories.   Keep a food journal.   Avoiding skipping meals, consider cutting portions instead.    Diet combined with exercise helps maintain muscle while optimizing fat loss. Strength training is particularly important for building and maintaining muscle mass. Exercise helps reduce stress, increase energy, and improves fitness. Increasing  "exercise without diet control, however, may not burn enough calories to loose weight.     Start walking three days a week 10-20 minutes at a time  Work towards walking thirty minutes five days a week   Eventually, increase the speed of your walking for 1-2 minutes at time    In addition, we recommend that you review healthy lifestyles and methods for weight loss available through the National Institutes of Health patient information sites:  http://win.niddk.nih.gov/publications/index.htm    And look into health and wellness programs that may be available through your health insurance provider, employer, local community center, or jalil club.                     Frequently asked questions:  1. What is Obstructive Sleep Apnea (LOLY)? LOLY is the most common type of sleep apnea. Apnea means, \"without breath.\"  Apnea is most often caused by narrowing or collapse of the upper airway as muscles relax during sleep.   Almost everyone has occasional apneas. Most people with sleep apnea have had brief interruptions at night frequently for many years.  The severity of sleep apnea is related to how frequent and severe the events are.   2. What are the consequences of LOLY? Symptoms include: feeling sleepy during the day, snoring loudly, gasping or stopping of breathing, trouble sleeping, and occasionally morning headaches or heartburn at night.  Sleepiness can be serious and even increase the risk of falling asleep while driving. Other health consequences may include development of high blood pressure and other cardiovascular disease in persons who are susceptible. Untreated LOLY  can contribute to heart disease, stroke and diabetes.   3. What are the treatment options? In most situations, sleep apnea is a lifelong disease that must be managed with daily therapy. Medications are not effective for sleep apnea and surgery is generally not considered until other therapies have been tried. Your treatment is your choice . Continuous " Positive Airway (CPAP) works right away and is the therapy that is effective in nearly everyone. An oral device to hold your jaw forward is usually the next most reliable option. Other options include postioning devices (to keep you off your back), weight loss, and surgery including a tongue pacing device. There is more detail about some of these options below.  4. Are my sleep studies covered by insurance? Although we will request verification of coverage, we advise you also check in advance of the study to ensure there is coverage.    Important tips for those choosing CPAP and similar devices  REMEMBER-IF YOU RECEIVE A CALL FROM  [phone]-->IT IS TO SETUP A DEVICE  For new devices, sign up for device ELENA to monitor your device for your followup visits  We encourage you to utilize the [employer] elena or website ( https://Inside Secure/ ) to monitor your therapy progress and share the data with your healthcare team when you discuss your sleep apnea.                                                    Know your equipment:  CPAP is continuous positive airway pressure that prevents obstructive sleep apnea by keeping the throat from collapsing while you are sleeping. In most cases, the device is  smart  and can slowly self-adjusts if your throat collapses and keeps a record every day of how well you are treated-this information is available to you and your care team.  BPAP is bilevel positive airway pressure that keeps your throat open and also assists each breath with a pressure boost to maintain adequate breathing.  Special kinds of BPAP are used in patients who have inadequate breathing from lung or heart disease. In most cases, the device is  smart  and can slowly self-adjusts to assist breathing. Like CPAP, the device keeps a record of how well you are treated.  Your mask is your connection to the device. You get to choose what feels most comfortable and the staff will help to make sure if fits. Here: are  some examples of the different masks that are available: Magnetic mask aids may assist with use but there are safety issues that should be addressed when considering with magnets* ( see end of discussion).       Key points to remember on your journey with sleep apnea:  Sleep study.  PAP devices often need to be adjusted during a sleep study to show that they are effective and adjusted right.  Good tips to remember: Try wearing just the mask during a quiet time during the day so your body adapts to wearing it. A humidifier is recommended for comfort in most cases to prevent drying of your nose and throat. Allergy medication from your provider may help you if you are having nasal congestion.  Getting settled-in. It takes more than one night for most of us to get used to wearing a mask. Try wearing just the mask during a quiet time during the day so your body adapts to wearing it. A humidifier is recommended for comfort in most cases. Our team will work with you carefully on the first day and will be in contact within 4 days and again at 2 and 4 weeks for advice and remote device adjustments. Your therapy is evaluated by the device each day.   Use it every night. The more you are able to sleep naturally for 7-8 hours, the more likely you will have good sleep and to prevent health risks or symptoms from sleep apnea. Even if you use it 4 hours it helps. Occasionally all of us are unable to use a medical therapy, in sleep apnea, it is not dangerous to miss one night.   Communicate. Call our skilled team on the number provided on the first day if your visit for problems that make it difficult to wear the device. Over 2 out of 3 patients can learn to wear the device long-term with help from our team. Remember to call our team or your sleep providers if you are unable to wear the device as we may have other solutions for those who cannot adapt to mask CPAP therapy. It is recommended that you sleep your sleep provider within  the first 3 months and yearly after that if you are not having problems.   Use it for your health. We encourage use of CPAP masks during daytime quiet periods to allow your face and brain to adapt to the sensation of CPAP so that it will be a more natural sensation to awaken to at night or during naps. This can be very useful during the first few weeks or months of adapting to CPAP though it does not help medically to wear CPAP during wakefulness and  should not be used as a strategy just to meet guidelines.  Take care of your equipment. Make sure you clean your mask and tubing using directions every day and that your filter and mask are replaced as recommended or if they are not working.     *Masks with magnets:  Updated Contraindications  Masks with magnetic components are contraindicated for use by patients where they, or anyone in close physical contact while using the mask, have the following:   Active medical implants that interact with magnets (i.e., pacemakers, implantable cardioverter defibrillators (ICD), neurostimulators, cerebrospinal fluid (CSF) shunts, insulin/infusion pumps)   Metallic implants/objects containing ferromagnetic material (i.e., aneurysm clips/flow disruption devices, embolic coils, stents, valves, electrodes, implants to restore hearing or balance with implanted magnets, ocular implants, metallic splinters in the eye)  Updated Warning  Keep the mask magnets at a safe distance of at least 6 inches (150 mm) away from implants or medical devices that may be adversely affected by magnetic interference. This warning applies to you or anyone in close physical contact with your mask. The magnets are in the frame and lower headgear clips, with a magnetic field strength of up to 400mT. When worn, they connect to secure the mask but may inadvertently detach while asleep.  Implants/medical devices, including those listed within contraindications, may be adversely affected if they change function  under external magnetic fields or contain ferromagnetic materials that attract/repel to magnetic fields (some metallic implants, e.g., contact lenses with metal, dental implants, metallic cranial plates, screws, hesham hole covers, and bone substitute devices). Consult your physician and  of your implant / other medical device for information on the potential adverse effects of magnetic fields.    BESIDES CPAP, WHAT OTHER THERAPIES ARE THERE?    Positioning Device  Positioning devices are generally used when sleep apnea is mild and only occurs on your back.This example shows a pillow that straps around the waist. It may be appropriate for those whose sleep study shows milder sleep apnea that occurs primarily when lying flat on one's back. Preliminary studies have shown benefit but effectiveness at home may need to be verified by a home sleep test. These devices are generally not covered by medical insurance.  Examples of devices that maintain sleeping on the back to prevent snoring and mild sleep apnea.    Belt type body positioner  http://NeurAxon/    Electronic reminder  http://nightshifttherapy.com/            Oral Appliance  What is oral appliance therapy?  An oral appliance device fits on your teeth at night like a retainer used after having braces. The device is made by a specialized dentist and requires several visits over 1-2 months before a manufactured device is made to fit your teeth and is adjusted to prevent your sleep apnea. Once an oral device is working properly, snoring should be improved. A home sleep test may be recommended at that time if to determine whether the sleep apnea is adequately treated.       Some things to remember:  -Oral devices are often, but not always, covered by your medical insurance. Be sure to check with your insurance provider.   -If you are referred for oral therapy, you will be given a list of specialized dentists to consider or you may choose to visit the  Web site of the American Academy of Dental Sleep Medicine  -Oral devices are less likely to work if you have severe sleep apnea or are extremely overweight.     More detailed information  An oral appliance is a small acrylic device that fits over the upper and lower teeth  (similar to a retainer or a mouth guard). This device slightly moves jaw forward, which moves the base of the tongue forward, opens the airway, improves breathing for effective treat snoring and obstructive sleep apnea in perhaps 7 out of 10 people .  The best working devices are custom-made by a dental device  after a mold is made of the teeth 1, 2, 3.  When is an oral appliance indicated?  Oral appliance therapy is recommended as a first-line treatment for patients with primary snoring, mild sleep apnea, and for patients with moderate sleep apnea who prefer appliance therapy to use of CPAP4, 5. Severity of sleep apnea is determined by sleep testing and is based on the number of respiratory events per hour of sleep.   How successful is oral appliance therapy?  The success rate of oral appliance therapy in patients with mild sleep apnea is 75-80% while in patients with moderate sleep apnea it is 50-70%. The chance of success in patients with severe sleep apnea is 40-50%. The research also shows that oral appliances have a beneficial effect on the cardiovascular health of LOLY patients at the same magnitude as CPAP therapy7.  Oral appliances should be a second-line treatment in cases of severe sleep apnea, but if not completely successful then a combination therapy utilizing CPAP plus oral appliance therapy may be effective. Oral appliances tend to be effective in a broad range of patients although studies show that the patients who have the highest success are females, younger patients, those with milder disease, and less severe obesity. 3, 6.   Finding a dentist that practices dental sleep medicine  Specific training is available  through the American Academy of Dental Sleep Medicine for dentists interested in working in the field of sleep. To find a dentist who is educated in the field of sleep and the use of oral appliances, near you, visit the Web site of the American Academy of Dental Sleep Medicine.    References  1. Salma et al. Objectively measured vs self-reported compliance during oral appliance therapy for sleep-disordered breathing. Chest 2013; 144(5): 1230-0087.  2. Debora, et al. Objective measurement of compliance during oral appliance therapy for sleep-disordered breathing. Thorax 2013; 68(1): 91-96.  3. Marcos, et al. Mandibular advancement devices in 620 men and women with LOLY and snoring: tolerability and predictors of treatment success. Chest 2004; 125: 8474-7067.  4. Thomas et al. Oral appliances for snoring and LOLY: a review. Sleep 2006; 29: 244-262.  5. Ken, et al. Oral appliance treatment for LOLY: an update. J Clin Sleep Med 2014; 10(2): 215-227.  6. Vic et al. Predictors of OSAH treatment outcome. J Dent Res 2007; 86: 4451-6823.      Weight Loss:   Your Body mass index is 28.79 kg/m .    Being overweight does not necessarily mean you will have health consequences.  Those who have BMI over 35 or over 27 with existing medical conditions carries greater risk.   Weight loss decreases severity of sleep apnea in most people with obesity. For those with mild obesity who have developed snoring with weight gain, even 15-30 pound weight loss can improve and occasionally milder eliminate sleep apnea.  Structured and life-long dietary and health habits are necessary to lose weight and keep healthier weight levels.     The Comprehensive Weight loss program offers all aspects of weight loss strategies including two Non-Surgical Weight Loss Programs: Medical Weight Management and our 24 Week Healthy Lifestyle Program:    Medical Weight Management: You will meet with a Medical Weight Management Provider, as  well as a Registered Dietician. The program may include medication therapy, dietary education, recommended exercise and physical therapy programs, monthly support group meetings, and possible psychological counseling. Follow up visits with the provider or dietician are scheduled based on your progress and needs.    24 Week Healthy Lifestyle Program: This unique program is designed to give you the support of weekly appointments and activities thru a 24-week period. It may include all of the components of the basic program (above), with the addition of 11 individual Health  Visits, 24-week access to the [employer] website for over 700 online classes, and monthly support group meetings. This program has an out-of-pocket expense of $499 to cover the items that can not be billed to insurance (health coaches and [employer] access), and is non-refundable/non-transferable (you may be able to use a Health Savings Account; ask your [facility] provider). There may be an optional meal replacement plan prescribed as well.   Surgical management achieves meaningful long-term weight loss and improvement in health risks in most patients with more severe obesity.      Sleep Apnea Surgery:    Surgery for obstructive sleep apnea is considered generally only when other therapies fail to work. Surgery may be discussed with you if you are having a difficult time tolerating CPAP and or when there is an abnormal structure that requires surgical correction.  Nose and throat surgeries often enlarge the airway to prevent collapse.  Most of these surgeries create pain for 1-2 weeks and up to half of the most common surgeries are not effective throughout life.  You should carefully discuss the benefits and drawbacks to surgery with your sleep provider and surgeon to determine if it is the best solution for you.   More information  Surgery for LOLY is directed at areas that are responsible for narrowing or complete obstruction of the airway during  sleep.  There are a wide range of procedures available to enlarge and/or stabilize the airway to prevent blockage of breathing in the three major areas where it can occur: the palate, tongue, and nasal regions.  Successful surgical treatment depends on the accurate identification of the factors responsible for obstructive sleep apnea in each person.  A personalized approach is required because there is no single treatment that works well for everyone.  Because of anatomic variation, consultation with an examination by a sleep surgeon is a critical first step in determining what surgical options are best for each patient.  In some cases, examination during sedation may be recommended in order to guide the selection of procedures.  Patients will be counseled about risks and benefits as well as the typical recovery course after surgery. Surgery is typically not a cure for a person s LOLY.  However, surgery will often significantly improve one s LOLY severity (termed  success rate ).  Even in the absence of a cure, surgery will decrease the cardiovascular risk associated with OSA7; improve overall quality of life8 (sleepiness, functionality, sleep quality, etc).      Palate Procedures:  Patients with LOLY often have narrowing of their airway in the region of their tonsils and uvula.  The goals of palate procedures are to widen the airway in this region as well as to help the tissues resist collapse.  Modern palate procedure techniques focus on tissue conservation and soft tissue rearrangement, rather than tissue removal.  Often the uvula is preserved in this procedure. Residual sleep apnea is common in patient after pharyngoplasty with an average reduction in sleep apnea events of 33%2.      Tongue Procedures:  ExamWhile patients are awake, the muscles that surround the throat are active and keep this region open for breathing. These muscles relax during sleep, allowing the tongue and other structures to collapse and block  breathing.  There are several different tongue procedures available.  Selection of a tongue base procedure depends on characteristics seen on physical exam.  Generally, procedures are aimed at removing bulky tissues in this area or preventing the back of the tongue from falling back during sleep.  Success rates for tongue surgery range from 50-62%3.    Hypoglossal Nerve Stimulation:  Hypoglossal nerve stimulation has recently received approval from the [place] Food and Drug Administration for the treatment of obstructive sleep apnea.  This is based on research showing that the system was safe and effective in treating sleep apnea6.  Results showed that the median AHI score decreased 68%, from 29.3 to 9.0. This therapy uses an implant system that senses breathing patterns and delivers mild stimulation to airway muscles, which keeps the airway open during sleep.  The system consists of three fully implanted components: a small generator (similar in size to a pacemaker), a breathing sensor, and a stimulation lead.  Using a small handheld remote, a patient turns the therapy on before bed and off upon awakening.    Candidates for this device must be greater than 18 years of age, have moderate to severe obstructive sleep apnea with less than 25% central events  (AHI between 15-65), BMI less than 35, have tried CPAP/oral appliance for at least 8 weeks without success, and have appropriate upper airway anatomy (determined by a sleep endoscopy performed by Dr. Ponce Lou or Dr. Amilcar Ty).    Nasal Procedures:  Nasal obstruction can interfere with nasal breathing during the day and night.  Studies have shown that relief of nasal obstruction can improve the ability of some patients to tolerate positive airway pressure therapy for obstructive sleep apnea1.  Treatment options include medications such as nasal saline, topical corticosteroid and antihistamine sprays, and oral medications such as antihistamines or  decongestants. Non-surgical treatments can include external nasal dilators for selected patients. If these are not successful by themselves, surgery can improve the nasal airway either alone or in combination with these other options.        Combination Procedures:  Combination of surgical procedures and other treatments may be recommended, particularly if patients have more than one area of narrowing or persistent positional disease.  The success rate of combination surgery ranges from 66-80%2,3.    References  Lu LOZADA. The Role of the Nose in Snoring and Obstructive Sleep Apnoea: An Update.  Eur Arch Otorhinolaryngol. 2011; 268: 1365-73.   Norma SM; Mary JA; Bernadette JR; Pallanch JF; Milagros MB; Elizabeth SG; hCance MONTAGUE. Surgical modifications of the upper airway for obstructive sleep apnea in adults: a systematic review and meta-analysis. SLEEP 2010;33(10):4377-0105. Viridiana PETTY. Hypopharyngeal surgery in obstructive sleep apnea: an evidence-based medicine review.  Arch Otolaryngol Head Neck Surg. 2006 Feb;132(2):206-13.  William YH1, Isis Y, Marcin TEOFILO. The efficacy of anatomically based multilevel surgery for obstructive sleep apnea. Otolaryngol Head Neck Surg. 2003 Oct;129(4):327-35.  Viridiana PETTY, Goldberg A. Hypopharyngeal Surgery in Obstructive Sleep Apnea: An Evidence-Based Medicine Review. Arch Otolaryngol Head Neck Surg. 2006 Feb;132(2):206-13.  Eva PJ et al. Upper-Airway Stimulation for Obstructive Sleep Apnea.  N Engl J Med. 2014 Jan 9;370(2):139-49.  Marty Y et al. Increased Incidence of Cardiovascular Disease in Middle-aged Men with Obstructive Sleep Apnea. Am J Respir Crit Care Med; 2002 166: 159-165  Key EM et al. Studying Life Effects and Effectiveness of Palatopharyngoplasty (SLEEP) study: Subjective Outcomes of Isolated Uvulopalatopharyngoplasty. Otolaryngol Head Neck Surg. 2011; 144: 623-631.        WHAT IF I ONLY HAVE SNORING?    Mandibular advancement devices, lateral sleep positioning,  long-term weight loss and treatment of nasal allergies have been shown to improve snoring.  Exercising tongue muscles with a game (https://apps.[employer].A-Gas/us/elena/soundly-reduce-snoring/rk5206322536) or stimulating the tongue during the day with a device (https://doi.org/10.3390/fcw74180600) have improved snoring in some individuals.  https://www.[employer]-Gas/  https://www.sleepfoundation.org/best-anti-snoring-mouthpieces-and-mouthguards    Remember to Drive Safe... Drive Alive     Sleep health profoundly affects your health, mood, and your safety.  Thirty three percent of the population (one in three of us) is not getting enough sleep and many have a sleep disorder. Not getting enough sleep or having an untreated / undertreated sleep condition may make us sleepy without even knowing it. In fact, our driving could be dramatically impaired due to our sleep health. As your provider, here are some things I would like you to know about driving:     Here are some warning signs for impairment and dangerous drowsy driving:              -Having been awake more than 16 hours               -Looking tired               -Eyelid drooping              -Head nodding (it could be too late at this point)              -Driving for more than 30 minutes     Some things you could do to make the driving safer if you are experiencing some drowsiness:              -Stop driving and rest              -Call for transportation              -Make sure your sleep disorder is adequately treated     Some things that have been shown NOT to work when experiencing drowsiness while driving:              -Turning on the radio              -Opening windows              -Eating any  distracting  /  entertaining  foods (e.g., sunflower seeds, candy, or any other)              -Talking on the phone      Your decision may not only impact your life, but also the life of others. Please, remember to drive safe for yourself and all of us.

## 2025-01-08 NOTE — TELEPHONE ENCOUNTER
ATC spoke with patient about scheduling a new DEXA scan which should be performed at the [facility] on the same scanner as she previously completed. She was also reminded that she will complete her next Reclast infusion in July 2025 and labs prior to the infusion. The patient understood the directions and had no questions.

## 2025-01-14 ENCOUNTER — THERAPY VISIT (OUTPATIENT)
Dept: PHYSICAL THERAPY | Facility: CLINIC | Age: 75
End: 2025-01-14
Payer: COMMERCIAL

## 2025-01-14 DIAGNOSIS — G89.29 CHRONIC BILATERAL LOW BACK PAIN, UNSPECIFIED WHETHER SCIATICA PRESENT: ICD-10-CM

## 2025-01-14 DIAGNOSIS — G62.9 PERIPHERAL POLYNEUROPATHY: ICD-10-CM

## 2025-01-14 DIAGNOSIS — G89.4 CHRONIC PAIN SYNDROME: ICD-10-CM

## 2025-01-14 DIAGNOSIS — R29.898 RIGHT LEG WEAKNESS: ICD-10-CM

## 2025-01-14 DIAGNOSIS — M79.18 MYOFASCIAL PAIN: ICD-10-CM

## 2025-01-14 DIAGNOSIS — M21.371 RIGHT FOOT DROP: ICD-10-CM

## 2025-01-14 DIAGNOSIS — M54.50 CHRONIC BILATERAL LOW BACK PAIN, UNSPECIFIED WHETHER SCIATICA PRESENT: ICD-10-CM

## 2025-01-14 DIAGNOSIS — R26.89 BALANCE PROBLEM: ICD-10-CM

## 2025-01-14 NOTE — PROGRESS NOTES
PHYSICAL THERAPY EVALUATION  Type of Visit: Evaluation        Fall Risk Screen:  Fall screen completed by: PT  Have you fallen 2 or more times in the past year?: Yes  Have you fallen and had an injury in the past year?: Yes  Is patient a fall risk?: Yes  Fall screen comments: History of multiple falls and objective measures indicating falls risk    Subjective         Presenting condition or subjective complaint: Right lower leg atrophy    Date of onset: 01/09/25 (referral date)      Relevant medical history: Anemia; Arthritis; Cancer; Diabetes; Dizziness; Fibromyalgia; Foot drop; Hearing problems; Heart problems; Hepatitis; High blood pressure; History of fractures; Incontinence; Menopause; Osteoarthritis; Osteoporosis; Overweight; Radiation treatment; Severe dizziness; Sleep disorder like apnea; Thyroid problems; Vision problems   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  Too many to list here.  Past Surgical History:   Procedure Laterality Date    ABDOMEN SURGERY      ovarian cyst, mesh    ARTHRODESIS WRIST Right     ARTHRODESIS WRIST  02/14/2013    Procedure: ARTHRODESIS WRIST;  left wrist scaphoid excision, four bone fusion, iliac crest bone graft  ( Mac with block);  Surgeon: Av Mendez MD;  [facility]    BIOPSY      skin, vaginal    BLEPHAROPLASTY BILATERAL Bilateral 05/06/2022    Procedure: UPPER BLEPHAROPLASTY, BILATERAL;  Surgeon: Jemal Sanchez,  MD;  Location: [facility] OR    CATARACT IOL, RT/LT Right 03/13/2018    CATARACT IOL, RT/LT Left 02/20/2018    COLONOSCOPY  12/24/2013    Procedure: COMBINED COLONOSCOPY, SINGLE BIOPSY/POLYPECTOMY BY BIOPSY;  COLONOSCOPY;  Surgeon: Dom Alvarez MD;  Location:  GI    COLONOSCOPY N/A 3/12/2024    Procedure: COLONOSCOPY, FLEXIBLE, WITH LESION REMOVAL USING SNARE;  Surgeon: Amina Espinoza MD;  Location: [facility]    COSMETIC BLEPHAROPLASTY LOWER LIDS BILATERAL Bilateral 05/06/2022    Procedure: BLEPHAROPLASTY, LOWER EYELID, BILATERAL, COSMETIC;  Surgeon: Jemal Sanchez MD;  Location: [facility] OR    COSMETIC SURGERY      ESOPHAGOSCOPY, GASTROSCOPY, DUODENOSCOPY (EGD), COMBINED N/A 11/23/2016    Procedure: COMBINED ESOPHAGOSCOPY, GASTROSCOPY, DUODENOSCOPY (EGD);  Surgeon: Quinten Feliciano MD;  Location: [facility]    ESOPHAGOSCOPY, GASTROSCOPY, DUODENOSCOPY (EGD), COMBINED N/A 3/12/2024    Procedure: ESOPHAGOGASTRODUODENOSCOPY, WITH BIOPSY;  Surgeon: Amina Espinoza MD;  Location:  GI    EXTERNAL EAR SURGERY      right    EYE SURGERY      radial keratomy    FUSION, SPINE, INTERBODY, OBLIQUE ANTERIOR AND LUMBAR, 2 LEVELS, POST APPROACH, USING OTS N/A 11/18/2021    Procedure: Part 1: Oblique Anterior Interbody Fusion at Lumbar 5 to sacral 1 with use of Bone Morphogenic Protein,;  Surgeon: Serge Ramirez MD;  Location:  OR    GI SURGERY      Umbilical hernia repair    GRAFT BONE FROM ILIAC CREST  02/14/2013    Procedure: GRAFT BONE FROM ILIAC CREST;  mac with block and local infilitration;  Surgeon: Av Mendez MD;  [facility]     BREATH HYDROGEN TEST N/A 10/14/2016    Procedure: HYDROGEN BREATH TEST;  Surgeon: Cheri Barron MD;  Location:  GI    HERNIA REPAIR      umbilical age 18 mos.    HYSTERECTOMY TOTAL ABDOMINAL  05/03/2000    INJECT EPIDURAL CAUDAL N/A 09/12/2023    Procedure: Caudal epidural steroid injection with fluoroscopy;  Surgeon: Natasha Umaña MD;  Location: [facility]  OR    INJECT EPIDURAL CAUDAL N/A 1/2/2024    Procedure: INJECTION, EPIDURAL, CAUDAL;  Surgeon: Natasha Umaña MD;  Location: [facility] OR    INJECT EPIDURAL LUMBAR N/A 05/23/2023    Procedure: L3-4 interlaminar epidural steroid injection with fluoroscopy ( left> right);  Surgeon: Natasha Umaña MD;  Location: [facility] OR    INJECT JOINT SACROILIAC Left 04/27/2023    Procedure: Left sacroiliac joint steroid injection with fluoroscopy;  Surgeon: Natasha Umaña MD;  Location: [facility] OR    INJECT JOINT SACROILIAC Bilateral 10/2/2024    Procedure: Bilateral sacroiliac joint injection;  Surgeon: Thais Saldivar MD;  Location: [facility] OR    MASTECTOMY MODIFIED RADICAL Bilateral     bilateral; right breast prophylactic    OPTICAL TRACKING SYSTEM FUSION POSTERIOR SPINE LUMBAR N/A 11/18/2021    Procedure: Open Posterior Instrumented Spinal Fusion at Lumbar 5 to Sacral 1, with grimm aguayo osteotomy, use of O-Arm/Stealth;  Surgeon: Serge Ramirez MD;  [facility]    OTOPLASTY Bilateral 7/25/2024    Procedure: Right Revision Functional Otoplasty;  Surgeon: Juan Agudelo MD;  Location: [facility] OR    PARATHYROIDECTOMY  09/23/2004    R SUPERIOR    PARATHYROIDECTOMY  09/23/2004    parathyroid resection, subtotal    RELEASE CARPAL TUNNEL Right 12/02/2021    Procedure: RIGHT CARPAL TUNNEL RELEASE, RIGHT WRIST HARDWARE REMOVAL, RIGHT CARPAL BOSS EXCISION;  Surgeon: Rolan Conley MD;  Location: [facility] OR    REMOVE HARDWARE HAND  09/24/2013    Procedure: REMOVE HARDWARE HAND;  Left Hand Screw Removal       RHINOPLASTY  1968    thyr proc skin closed cosmetic manner by subcuticular stitch  01/23/2009    THYROPLASTY  10/09/2009    TONSILLECTOMY  1977    VITRECTOMY PARSPLANA WITH 25 GAUGE SYSTEM Left 06/20/2022    Procedure: LEFT EYE VITRECTOMY, PARS PLANA APPROACH, USING 25-GAUGE INSTRUMENTS, laser;  Surgeon: Felix Carlos MD;  [place] [facility] [place]    WRIST SURGERY      wrist arthrodesis     Prior diagnostic imaging/testing  results:     Yes, see EMR. None relevant to her current condition at this time.  Prior therapy history for the same diagnosis, illness or injury: No      Prior Level of Function  Transfers: Independent  Ambulation: Assistive equipment  ADL: Assistive equipment  IADL: Driving, Housekeeping, Laundry, Meal preparation, Medication management    Living Environment  Social support: Alone   Type of home: [facility]   Stairs to enter the home: No       Ramp: No   Stairs inside the home: Yes 13 Is there a railing: Yes     Help at home: Home management tasks (cooking, cleaning); Home and Yard maintenance tasks  Equipment owned: Straight Cane; Walker with wheels; Crutches; Standard wheelchair     Employment: Yes RN consultant  Hobbies/Interests: Reading, volunteering in community    Patient goals for therapy: Explore therapies to help increase lower leg strength    Pain assessment: Pain present- significant SI joint pain with hip flexion     Objective      Cognitive Status Examination  Orientation: Oriented to person, place and time   Level of Consciousness: Alert  Follows Commands and Answers Questions: 100% of the time  Personal Safety and Judgement: Intact  Memory: Intact    OBSERVATION: No apparent distress  INTEGUMENTARY:  Swelling bilateral LEs, pt reporting more severe today due to missed medication yesterday  POSTURE: Sitting Posture: Rounded shoulders, Forward head    RANGE OF MOTION: LE ROM WFL  UE ROM WFL  STRENGTH:   Hip flexion 4/5 bilaterally  Knee extension L: 3 (limited by pain) R: 4  PF: R: 3 L:3, able to complete single leg calf raise 1 per side with limited heel clearance  DF: 2+ R, 3+ L    BED MOBILITY: Independent    TRANSFERS: Independent    GAIT:   Level of Roebling: Independent  Assistive Device(s): Cane (single end)  Gait Deviations: Drop foot R  Stairs: Able to safely complete 4 stairs with bilateral UE  on single rail  Speed: 0.97m/s    SENSATION: reduced sensation bilateral LEs 2'  neuropathy    Assessment & Plan   CLINICAL IMPRESSIONS  Medical Diagnosis: LE weakness    Treatment Diagnosis: RLE atrophy, foot drop, weakness   Impression/Assessment: Patient is a 74 year old female who presents with RLE atrophy, concerns about visible loss of muscle in R calf.  The following significant findings have been identified: Pain, Decreased strength, Impaired gait, Impaired muscle performance, and Decreased activity tolerance. These impairments interfere with their ability to perform self care tasks, household chores, and household mobility as compared to previous level of function.     Clinical Decision Making (Complexity):  Clinical Presentation: Stable/Uncomplicated  Clinical Presentation Rationale: based on medical and personal factors listed in PT evaluation  Clinical Decision Making (Complexity): Low complexity    PLAN OF CARE  Treatment Interventions:  Interventions: Gait Training, Manual Therapy, Neuromuscular Re-education, Therapeutic Activity, Therapeutic Exercise, Self-Care/Home Management    Long Term Goals     PT Goal 1  Goal Identifier: MET: HEP  Goal Description: Pt will demonstrate IND with strength, balance, and muscular and aerobic endurance HEP, while working to improve capacity for functional mobility.  Goal Progress: 1/14/25: demonstrated independence with home program, does not want follow up appointments  Target Date: 03/14/25  Date Met: 01/14/25      Frequency of Treatment: 1 visit only  Duration of Treatment: 1 visit only    Education Assessment:   Learner/Method: Patient;Listening;Demonstration;Pictures/Video;Reading;No Barriers to Learning    Risks and benefits of evaluation/treatment have been explained.   Patient/Family/caregiver agrees with Plan of Care.     Evaluation Time: 20          Signing Clinician: Sari Ruiz, PT, DPT, NCS      [facility]                                                                                   OUTPATIENT  PHYSICAL THERAPY      PLAN OF TREATMENT FOR OUTPATIENT REHABILITATION   Patient's Last Name, First Name, Nadira Meyers YOB: 1950   Provider's Name   [facility]   Medical Record No.  [id number]     Onset Date: 01/09/25 (referral date)  Start of Care Date: 01/14/25     Medical Diagnosis:  LE weakness      PT Treatment Diagnosis:  RLE atrophy, foot drop, weakness Plan of Treatment  Frequency/Duration: 1 visit only/ 1 visit only    Certification date from 01/14/25 to 01/15/25         See note for plan of treatment details and functional goals     Sari Ruiz, PT, DPT, NCS                          I CERTIFY THE NEED FOR THESE SERVICES FURNISHED UNDER        THIS PLAN OF TREATMENT AND WHILE UNDER MY CARE     (Physician attestation of this document indicates review and certification of the therapy plan).              Referring Provider:  MERCEDES Pace CNP    Initial Assessment  See Epic Evaluation- Start of Care Date: 01/14/25

## 2025-01-20 ENCOUNTER — ANCILLARY PROCEDURE (OUTPATIENT)
Dept: GENERAL RADIOLOGY | Facility: CLINIC | Age: 75
End: 2025-01-20
Attending: FAMILY MEDICINE
Payer: COMMERCIAL

## 2025-01-20 ENCOUNTER — PRE VISIT (OUTPATIENT)
Dept: ORTHOPEDICS | Facility: CLINIC | Age: 75
End: 2025-01-20

## 2025-01-20 ENCOUNTER — OFFICE VISIT (OUTPATIENT)
Dept: ORTHOPEDICS | Facility: CLINIC | Age: 75
End: 2025-01-20
Payer: COMMERCIAL

## 2025-01-20 DIAGNOSIS — M79.674 GREAT TOE PAIN, RIGHT: ICD-10-CM

## 2025-01-20 DIAGNOSIS — M25.559 HIP PAIN, UNSPECIFIED LATERALITY: ICD-10-CM

## 2025-01-20 DIAGNOSIS — M25.559 HIP PAIN, UNSPECIFIED LATERALITY: Primary | ICD-10-CM

## 2025-01-20 DIAGNOSIS — M53.3 SACROILIAC PAIN: Primary | ICD-10-CM

## 2025-01-20 DIAGNOSIS — M53.3 SACROILIAC PAIN: ICD-10-CM

## 2025-01-20 PROCEDURE — 99215 OFFICE O/P EST HI 40 MIN: CPT | Performed by: FAMILY MEDICINE

## 2025-01-20 PROCEDURE — 73522 X-RAY EXAM HIPS BI 3-4 VIEWS: CPT | Performed by: RADIOLOGY

## 2025-01-20 PROCEDURE — 73660 X-RAY EXAM OF TOE(S): CPT | Mod: RT | Performed by: RADIOLOGY

## 2025-01-20 PROCEDURE — 72170 X-RAY EXAM OF PELVIS: CPT | Mod: XS | Performed by: RADIOLOGY

## 2025-01-20 RX ORDER — HYDROCODONE BITARTRATE AND ACETAMINOPHEN 5; 325 MG/1; MG/1
1 TABLET ORAL EVERY 6 HOURS PRN
Qty: 10 TABLET | Refills: 0 | Status: SHIPPED | OUTPATIENT
Start: 2025-01-20 | End: 2025-01-23 | Stop reason: ALTCHOICE

## 2025-01-20 NOTE — TELEPHONE ENCOUNTER
DIAGNOSIS: fall today, hip pain / no img / [employer] / [facility]     APPOINTMENT DATE: 1.20.25   NOTES STATUS DETAILS   OFFICE NOTE from other specialist Internal 7.30.20  Arnulfo  Spts    1.24.20  Suad  Spts   MEDICATION LIST Internal    XRAYS (IMAGES & REPORTS) Internal *sched* for 1.20.25  XR Hip [place]    1.24.20  XR Hip Left

## 2025-01-20 NOTE — PROGRESS NOTES
Sports Medicine Clinic Visit    PCP: Matilde Quiñonez Chris is a 74 year old female who is seen  as self referral presenting with low back pain.     Injury: Patient fell asleep in her office chair and landed on her buttock     Location of Pain: Low back   Duration of Pain: 4 day(s)  Rating of Pain: 4/10  8/10 without meds   Pain is better with: Stretching   Pain is worse with: Walking   Additional Features: Radiating down the legs   Treatment so far consists of: ice and rest  Stretching Vicodin   Prior History of related problems: Yes;     LMP  (LMP Unknown)

## 2025-01-20 NOTE — Clinical Note
Just DIXON I saw Ismael today. She may reach out to you for follow-up and should be contacting the pain clinic. Jennifer Baxter

## 2025-01-20 NOTE — LETTER
"  1/20/2025      RE: Nadira Perez  [address]  [facility] [place] [facility] [address]     Dear Colleague,    Thank you for referring your patient, Nadira Perez, to the [facility] [place]. Please see a copy of my visit note below.    Sports Medicine Clinic Visit    PCP: Matilde Quiñonez    Nadira Perez is a 74 year old female who is seen  as self referral presenting with low back pain.     Injury: Patient fell asleep in her office chair and landed on her buttock     Location of Pain: Low back   Duration of Pain: 4 day(s)  Rating of Pain: 4/10  8/10 without meds   Pain is better with: Stretching   Pain is worse with: Walking   Additional Features: Radiating down the legs   Treatment so far consists of: ice and rest  Stretching Vicodin   Prior History of related problems: Yes;     LMP  (LMP Unknown)      ASSESSMENT/PLAN:    (M53.3) Sacroiliac pain  (primary encounter diagnosis)  Comment: exam, imaging reviewed w/ pt; no evidence of new acute bony injury to pelvis or known hardware; will reach out to pain clinic for consideration for repeat SI injection; small supply of norco provided; will reach out to PCP if additional medication is needed; f/up prn  Plan: XR Pelvis 1/2 Views, HYDROcodone-acetaminophen (NORCO) 5-325 MG tablet          (M79.674) Great toe pain, right  Comment: neg for fx  Plan: XR Toe Right G/E 2 Views    >40 min was spent by me personally on the day of visit in review of records, direct patient care, documentation, and care coordination.           Sahil Kimball MD  January 20, 2025  12:31 PM      Pt is a 74 year old female last seen on 12/10/24 here for follow up of:     Low back/ SI pain -   Fell of chair and landed on bottom 4 days ago  Aggravated her SI joint -> wanted to make sure she did not have a fracture  \"Sciatica has returned\" - bilateral initially; right now it is her R;  SI hurting equally initially but R side now worse than L; usually L is worse " than R  Injury: Patient fell asleep in her office chair and landed on her buttock     Rating of Pain: 4/10  8/10 without meds   Pain is better with: Stretching   Pain is worse with: Walking   Additional Features: Radiating down R leg   Treatment so far consists of: ice and rest, Stretching  Prior History of related problems: Yes    2) Hyperextended R great toe s/p fall     Per pain mgmt note on 12/10/24:  Assessment:   Nadira Perez is a 74 year old year old female  who presents to the pain clinic with low back pain. It has been noted that the caudal GREGOR covers her chronic back pain the best thus far. Giong forward we will try to refrain from repeating SI joint and L3-4 GREGOR injection to avoid the side effects of cumulative steroids. Prior discussion with Dr. Umaña -  she is receiving steroid injections in the wrist, need to keep a count of annual steroid injections received.         Assigned to [facility] nursing team.     Visit Diagnoses:  1. Chronic pain syndrome    2. Balance problem    3. Right foot drop    4. Right leg weakness    5. Neuropathic pain    6. Peripheral polyneuropathy    7. Myofascial pain    8. Chronic bilateral low back pain, unspecified whether sciatica present          Plan:  Diagnosis reviewed, treatment option addressed, and risk/benifits discussed.  Self-care instructions given.  I am recommending a multidisciplinary treatment plan to help this patient better manage their pain.       Physical Therapy:  YES     Pain PT with [facility] previously.   New referral for PT placed today, per patient request.   RLE lower leg atrophy, potentially from use of brace and chronic low back pain.     Pain Psychology: No     Diagnostic Studies:    No new orders today.      Medication Management:   Gabapentin 1200 mg TID and tizanidine 4 mg at bedtime PRN. PCP managing.   Topicals - Diclofenac gel and lidocaine cream   Medical cannabis/CBD - appreciates benefit, recent use of OTC products.      Potential  procedures:   Caudal GREGOR - last completed on 1/2/24. Appreciates benefit. May repeat every 3+ months. Advised to contact clinic if interested in repeat injection.   Bilateral SI joint injections completed on 10/2/24 with Dr. Saldivar. Reports significant benefit for 5 weeks, then pain increased to an extent but still less than before injection. Will continue to monitor benefit and may consider repeat in future if appropriate.      Other Orders/Referrals:   None      Follow up with MERCEDES Pace CNP in 4-6 months or sooner if needed     Past Medical History:   Diagnosis Date     Arthritis      Bone disease      Breast cancer (H)      Diabetes (H)      H/O kyphoplasty      Hearing problem      History of blood transfusion      History of kidney stones      History of radiation therapy      Hyperlipemia      Hypertension      Hypopotassemia      Irregular heart beat      Kidney problem      Lymph edema      Medullary sponge kidney      Osteopenia      PONV (postoperative nausea and vomiting)      Reduced vision      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       Ascorbic Acid (VITAMIN C) 500 MG CHEW Take 1 tablet by mouth every morning       atorvastatin (LIPITOR) 80 MG tablet Take 1 tablet (80 mg) by mouth daily. 90 tablet 3     carvedilol (COREG) 12.5 MG tablet Take 1 tablet (12.5 mg) by mouth 2 times daily (with meals). 180 tablet 3     CRANBERRY EXTRACT PO Take 500 mg by mouth every morning       diclofenac (VOLTAREN) 1 % topical gel Apply 2 g topically 4 times daily 100 g 6     gabapentin (NEURONTIN) 300 MG capsule Take 4 capsules (1,200 mg) by mouth 3 times daily. 1080 capsule 3     HEMP OIL OR EXTRACT OR OTHER CBD CANNABINOID, NOT  MEDICAL CANNABIS, THC       hydrochlorothiazide (HYDRODIURIL) 25 MG tablet Take 1 tablet (25 mg) by mouth daily. 90 tablet 3     levothyroxine (SYNTHROID/LEVOTHROID) 112 MCG tablet TAKE 1/2 TAB BY MOUTH EVERY DAY 45 tablet 3     lisinopril (ZESTRIL) 20 MG tablet Take 1 tablet (20 mg) by mouth 2 times daily. 180 tablet 3     MAGNESIUM GLYCINATE PO Take 400 mg by mouth at bedtime.       Melatonin 10 MG TABS tablet Take 10 mg by mouth nightly as needed.       Multiple Vitamin (MULTI-VITAMIN) per tablet Take 1 tablet by mouth every morning       omeprazole (PRILOSEC) 20 MG DR capsule Take 1 capsule (20 mg) by mouth daily. 90 capsule 3     ondansetron (ZOFRAN) 4 MG tablet Take 1 tablet (4 mg) by mouth every 6 hours as needed for nausea. 12 tablet 3     ondansetron (ZOFRAN-ODT) 4 MG ODT tab Take 1 tablet (4 mg) by mouth every 12 hours as needed for nausea 180 tablet 3     spironolactone (ALDACTONE) 50 MG tablet Take 1 tablet (50 mg) by mouth daily at 2 pm. 90 tablet 3     tiZANidine (ZANAFLEX) 4 MG tablet Take 1 tablet (4 mg) by mouth at bedtime. (Patient taking differently: Take 4 mg by mouth nightly as needed for muscle spasms.) 90 tablet 3     Turmeric 500 MG CAPS Take 500 mg by mouth every morning        Allergies   Allergen Reactions     Erythromycin Nausea     Penicillins Hives     Around age 6 - doesn't recall full reaction, mother told her it was hives.     Has tolerated cephalsporins.       ROS:   Gen- no fevers/chills   Derm - no rash/ redness   Neuro - see HPI  Remainder of ROS negative.     Exam:     Low back:  No bony tenderness of lower lumbar spine or sacrum  +TTP at R SI joint; + step off noted at L SI joint  +weakness of R lower extremity -> chronic  Neg slump bilateral  +dec sensation along lateral R leg into foot       Xray of bilateral hips/ pelvis and R foot on January 20, 2025 at [facility] location - films personally reviewed with patient at time of visit     My impression: 1) R foot - neg for fx 2)  pelvis/hips  +hardware from previous known lower lumbar fusion; + osteoporosis; +sclerosis at pubic symphysis; no acute fx noted      Again, thank you for allowing me to participate in the care of your patient.      Sincerely,    Sahil Kimball MD

## 2025-01-20 NOTE — PROGRESS NOTES
"ASSESSMENT/PLAN:    (M53.3) Sacroiliac pain  (primary encounter diagnosis)  Comment: exam, imaging reviewed w/ pt; no evidence of new acute bony injury to pelvis or known hardware; will reach out to pain clinic for consideration for repeat SI injection; small supply of norco provided; will reach out to PCP if additional medication is needed; f/up prn  Plan: XR Pelvis 1/2 Views, HYDROcodone-acetaminophen (NORCO) 5-325 MG tablet          (M79.674) Great toe pain, right  Comment: neg for fx  Plan: XR Toe Right G/E 2 Views    >40 min was spent by me personally on the day of visit in review of records, direct patient care, documentation, and care coordination.           Sahil Kimball MD  January 20, 2025  12:31 PM      Pt is a 74 year old female last seen on 12/10/24 here for follow up of:     Low back/ SI pain -   Fell of chair and landed on bottom 4 days ago  Aggravated her SI joint -> wanted to make sure she did not have a fracture  \"Sciatica has returned\" - bilateral initially; right now it is her R;  SI hurting equally initially but R side now worse than L; usually L is worse than R  Injury: Patient fell asleep in her office chair and landed on her buttock     Rating of Pain: 4/10  8/10 without meds   Pain is better with: Stretching   Pain is worse with: Walking   Additional Features: Radiating down R leg   Treatment so far consists of: ice and rest, Stretching  Prior History of related problems: Yes    2) Hyperextended R great toe s/p fall     Per pain mgmt note on 12/10/24:  Assessment:   Nadira Perez is a 74 year old year old female  who presents to the pain clinic with low back pain. It has been noted that the caudal GREGOR covers her chronic back pain the best thus far. Giong forward we will try to refrain from repeating SI joint and L3-4 GREGOR injection to avoid the side effects of cumulative steroids. Prior discussion with Dr. Umaña -  she is receiving steroid injections in the wrist, need to keep a count of " annual steroid injections received.         [place] [facility] [place]     Visit Diagnoses:  1. Chronic pain syndrome    2. Balance problem    3. Right foot drop    4. Right leg weakness    5. Neuropathic pain    6. Peripheral polyneuropathy    7. Myofascial pain    8. Chronic bilateral low back pain, unspecified whether sciatica present          Plan:  Diagnosis reviewed, treatment option addressed, and risk/benifits discussed.  Self-care instructions given.  I am recommending a multidisciplinary treatment plan to help this patient better manage their pain.       Physical Therapy:  YES     Pain PT with [facility] previously.   New referral for PT placed today, per patient request.   RLE lower leg atrophy, potentially from use of brace and chronic low back pain.     Pain Psychology: No     Diagnostic Studies:    No new orders today.      Medication Management:   Gabapentin 1200 mg TID and tizanidine 4 mg at bedtime PRN. PCP managing.   Topicals - Diclofenac gel and lidocaine cream   Medical cannabis/CBD - appreciates benefit, recent use of OTC products.      Potential procedures:   Caudal GREGOR - last completed on 1/2/24. Appreciates benefit. May repeat every 3+ months. Advised to contact clinic if interested in repeat injection.   Bilateral SI joint injections completed on 10/2/24 with Dr. Saldivar. Reports significant benefit for 5 weeks, then pain increased to an extent but still less than before injection. Will continue to monitor benefit and may consider repeat in future if appropriate.      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      Ascorbic Acid (VITAMIN C) 500 MG CHEW Take 1 tablet by mouth every morning      atorvastatin (LIPITOR) 80 MG tablet Take 1 tablet (80 mg) by mouth daily. 90 tablet 3    carvedilol (COREG) 12.5 MG tablet Take 1 tablet (12.5 mg) by mouth 2 times daily (with meals). 180 tablet 3    CRANBERRY EXTRACT PO Take 500 mg by mouth every morning      diclofenac (VOLTAREN) 1 % topical gel Apply 2 g topically 4 times daily 100 g 6    gabapentin (NEURONTIN) 300 MG capsule Take 4 capsules (1,200 mg) by mouth 3 times daily. 1080 capsule 3    HEMP OIL OR EXTRACT OR OTHER CBD CANNABINOID, NOT MEDICAL CANNABIS, THC      hydrochlorothiazide (HYDRODIURIL) 25 MG tablet Take 1 tablet (25 mg) by mouth daily. 90 tablet 3    levothyroxine (SYNTHROID/LEVOTHROID) 112 MCG tablet TAKE 1/2 TAB BY MOUTH EVERY DAY 45 tablet 3    lisinopril (ZESTRIL) 20 MG tablet Take 1 tablet (20 mg) by mouth 2 times daily. 180 tablet 3    MAGNESIUM GLYCINATE PO Take 400 mg by mouth at bedtime.      Melatonin 10 MG TABS tablet Take 10 mg by mouth nightly as needed.      Multiple Vitamin (MULTI-VITAMIN) per tablet Take 1 tablet by mouth every morning      omeprazole (PRILOSEC) 20 MG DR capsule Take 1 capsule (20 mg) by mouth daily. 90 capsule 3    ondansetron (ZOFRAN) 4 MG tablet Take 1 tablet (4 mg) by mouth every 6 hours as needed for nausea. 12 tablet 3    ondansetron (ZOFRAN-ODT) 4 MG ODT tab Take 1 tablet (4 mg) by mouth every 12 hours as needed for nausea 180 tablet 3    spironolactone  (ALDACTONE) 50 MG tablet Take 1 tablet (50 mg) by mouth daily at 2 pm. 90 tablet 3    tiZANidine (ZANAFLEX) 4 MG tablet Take 1 tablet (4 mg) by mouth at bedtime. (Patient taking differently: Take 4 mg by mouth nightly as needed for muscle spasms.) 90 tablet 3    Turmeric 500 MG CAPS Take 500 mg by mouth every morning        Allergies   Allergen Reactions    Erythromycin Nausea    Penicillins Hives     Around age 6 - doesn't recall full reaction, mother told her it was hives.     Has tolerated cephalsporins.       ROS:   Gen- no fevers/chills   Derm - no rash/ redness   Neuro - see HPI  Remainder of ROS negative.     Exam:     Low back:  No bony tenderness of lower lumbar spine or sacrum  +TTP at R SI joint; + step off noted at L SI joint  +weakness of R lower extremity -> chronic  Neg slump bilateral  +dec sensation along lateral R leg into foot       Xray of bilateral hips/ pelvis and R foot on January 20, 2025 at [facility] location - films personally reviewed with patient at time of visit     My impression: 1) R foot - neg for fx 2) pelvis/hips  +hardware from previous known lower lumbar fusion; + osteoporosis; +sclerosis at pubic symphysis; no acute fx noted

## 2025-01-22 ENCOUNTER — TELEPHONE (OUTPATIENT)
Dept: PLASTIC SURGERY | Facility: CLINIC | Age: 75
End: 2025-01-22
Payer: COMMERCIAL

## 2025-01-22 DIAGNOSIS — G47.36 HYPOXEMIA ASSOCIATED WITH SLEEP: ICD-10-CM

## 2025-01-22 NOTE — TELEPHONE ENCOUNTER
Called to reschedule surgery with: Dr. Agudelo    Spoke with Ismael    Rescheduled surgery due to Dr. Agudelo being no longer available on 3/4    Surgery Date: 2/27    Location of surgery: [facility]    Discussed with patient pre-op RN will call 2-3 days prior to surgery with arrival time and instructions:  Yes     Estimated Arrival time Discussed with Patient:   6:30 AM    Additional Comments:  Patient asking to keep 1 week post-op with Dr. Agudelo on 3/12       Caitlyn Florez on 1/22/2025 at 11:40 AM

## 2025-01-25 ENCOUNTER — HEALTH MAINTENANCE LETTER (OUTPATIENT)
Age: 75
End: 2025-01-25

## 2025-01-27 ENCOUNTER — VIRTUAL VISIT (OUTPATIENT)
Facility: CLINIC | Age: 75
End: 2025-01-27
Attending: INTERNAL MEDICINE
Payer: COMMERCIAL

## 2025-01-27 ENCOUNTER — TELEPHONE (OUTPATIENT)
Dept: ORTHOPEDICS | Facility: CLINIC | Age: 75
End: 2025-01-27
Payer: COMMERCIAL

## 2025-01-27 DIAGNOSIS — I10 BENIGN ESSENTIAL HYPERTENSION: Primary | ICD-10-CM

## 2025-01-27 NOTE — PATIENT INSTRUCTIONS
"Recommendations from today's MTM visit:                                                    MTM (medication therapy management) is a service provided by a clinical pharmacist designed to help you get the most of out of your medicines.   Today we reviewed what your medicines are for, how to know if they are working, that your medicines are safe and how to make your medicine regimen as easy as possible.    Continue to monitor blood pressure and schedule with me when you would like to discuss blood pressure further    Follow-up: Return if symptoms worsen or fail to improve.    It was great speaking with you today.  I value your experience and would be very thankful for your time in providing feedback in our clinic survey. In the next few days, you may receive an email or text message from [facility] with a link to a survey related to your  clinical pharmacist.\"     To schedule another MTM appointment, please call the clinic directly or you may call the MTM scheduling line at [phone] or toll-free at [phone].     My Clinical Pharmacist's contact information:                                                      Please feel free to contact me with any questions or concerns you have.      Willis So, Pharm. D., [facility]  Medication Therapy Management Pharmacist    "

## 2025-01-27 NOTE — PROGRESS NOTES
Medication Therapy Management (MTM) Encounter    ASSESSMENT:                            Medication Adherence/Access: No issues identified.    Hypertension   Blood pressure more recently stable below goal of <140/90 mmHg. If blood pressure gets low again may need to cut spironolactone dose in half.    PLAN:                            Continue to monitor blood pressure and schedule with me when you would like to discuss blood pressure further    Follow-up: Return if symptoms worsen or fail to improve.    SUBJECTIVE/OBJECTIVE:                          Ismael Perez is a 74 year old female seen for an initial visit. She was referred to me from Cinthia Rascon MD.      Reason for visit: hypertension management.    Allergies/ADRs: Reviewed in chart  Past Medical History: Reviewed in chart  Tobacco: She reports that she has never smoked. She has never been exposed to tobacco smoke. She has never used smokeless tobacco.  Alcohol: not discussed today    Medication Adherence/Access: no issues reported. - patient was in the bed with pain waiting for a doctor call back. Meant to cancel the visit but was okay with discussing blood pressure briefly.     Hypertension   Lisinopril 20 mg twice daily  Carvedilol 12.5 mg twice daily  Spironolactone 50 mg daily  Hydrochlorothiazide 25 mg daily - if she stops taking this she will get some edema  Patient is not experiencing side effects.  See home blood pressure readings below.  Patient notes she has verified the accuracy of her home monitor by comparing it to readings in clinic.    Patient reports some blood pressure into the 100/60 mmHg range but this was only for a week or two. Reports this morning was 143/96 mmHg normally 130/80's mmHg         Today's Vitals: LMP  (LMP Unknown)   ----------------      I spent 8 minutes with this patient today. All changes were made via collaborative practice agreement with Cinthia Rascon.     A summary of these recommendations was sent via  [employer].    Willis So, Pharm. D., BCACP  Medication Therapy Management Pharmacist      Telemedicine Visit Details  The patient's medications can be safely assessed via a telemedicine encounter.  Type of service:  Telephone visit  Originating Location (pt. Location): Home    Distant Location (provider location):  On-site  Start Time:  2:33 pm  End Time:  2:41 pm     Medication Therapy Recommendations  No medication therapy recommendations to display

## 2025-01-27 NOTE — TELEPHONE ENCOUNTER
Patient called Sports medicine provider back line phone number, requesting clarification from Dr. Kimball's REVShare message. She was wondering if she's calling the prescription line. I explained that the phone number she is calling is the provider back line for sports medicine and that patient's should be not calling this line to request same day appointments or other general questions. I encouraged her to call our main scheduling number [phone] or reach out to us on REVShare. Patient states she had no idea she was calling the back line and knew nothing about our main scheduling phone number. She states understanding of which phone number to call moving forward when she has questions. All questions were answered at this time. Gia Dewitt, ATC on 1/27/2025 at 2:14 PM

## 2025-01-28 ENCOUNTER — TELEPHONE (OUTPATIENT)
Dept: ORTHOPEDICS | Facility: CLINIC | Age: 75
End: 2025-01-28

## 2025-01-28 ENCOUNTER — TELEPHONE (OUTPATIENT)
Dept: FAMILY MEDICINE | Facility: CLINIC | Age: 75
End: 2025-01-28

## 2025-01-28 NOTE — TELEPHONE ENCOUNTER
Other: Patient is calling regarding pain relief, she is at an 8/10 doesn't think she can wait until next availability and appt was cancelled today due to provider illness, wondering if she can still be seen this week or who would be an appropriate provider or if it is recommended for her to go to her pain clinic     Could we send this information to you in [facility] or would you prefer to receive a phone call?:   Patient would prefer a phone call   Okay to leave a detailed message?: Yes at Cell number on file:    Telephone Information:   Mobile [phone]

## 2025-01-28 NOTE — TELEPHONE ENCOUNTER
Left Voicemail (1st Attempt) and Sent Mychart (1st Attempt) for the patient to call back and schedule the following:    Appointment type: Return Med Spine  Provider: Dr. Kimball  Return date: 1/28/25 canceled, reschedule to 1/30/25 hold spot  Specialty phone number: [phone]

## 2025-01-28 NOTE — PROGRESS NOTES
ASSESSMENT/PLAN:    (M53.3) Pain of both sacroiliac joints  (primary encounter diagnosis)  Comment: will update imaging and refer back to Dr Saldivar for guided SI injections; pred taper and taper down on pain medication initiated today; virtual follow-up in 1 wk; precautions given  Plan: Spine  Referral, CT Pelvis Bone wo         Contrast, CT Lumbar Spine w/o Contrast,         predniSONE (DELTASONE) 20 MG tablet, oxyCODONE         (ROXICODONE) 5 MG tablet          (Z98.1) History of lumbar fusion  Comment: see above  Plan: Spine  Referral, CT Pelvis Bone wo Contrast, CT Lumbar Spine w/o Contrast          Sahil Kimball MD  January 30, 2025  9:20 AM      Pt is a 74 year old female last seen on 1/20/25 here for follow up of:   SI joint pain -  She reports radiating pain down her bilateral legs. Her pain has improved to a 6/10. She has been taking oxycodone for the pain She has been laying low this past week and reports weakness of her legs and balance issues.   Oxycodone #28 tabs sent on 1/27/25    Per my last note:  (M53.3) Sacroiliac pain  (primary encounter diagnosis)  Comment: exam, imaging reviewed w/ pt; no evidence of new acute bony injury to pelvis or known hardware; will reach out to pain clinic for consideration for repeat SI injection; small supply of norco provided; will reach out to PCP if additional medication is needed; f/up prn  Plan: XR Pelvis 1/2 Views, HYDROcodone-acetaminophen (NORCO) 5-325 MG tablet          (M79.674) Great toe pain, right  Comment: neg for fx  Plan: XR Toe Right G/E 2 Views    Past Medical History:   Diagnosis Date    Arthritis     Bone disease     Breast cancer (H)     Diabetes (H)     H/O kyphoplasty     Hearing problem     History of blood transfusion     History of kidney stones     History of radiation therapy     Hyperlipemia     Hypertension     Hypopotassemia     Irregular heart beat     Kidney problem     Lymph edema     Medullary sponge kidney      Osteopenia     PONV (postoperative nausea and vomiting)     Reduced vision     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      Ascorbic Acid (VITAMIN C) 500 MG CHEW Take 1 tablet by mouth every morning      atorvastatin (LIPITOR) 80 MG tablet Take 1 tablet (80 mg) by mouth daily. 90 tablet 3    carvedilol (COREG) 12.5 MG tablet Take 1 tablet (12.5 mg) by mouth 2 times daily (with meals). 180 tablet 3    CRANBERRY EXTRACT PO Take 500 mg by mouth every morning      diclofenac (VOLTAREN) 1 % topical gel Apply 2 g topically 4 times daily 100 g 6    gabapentin (NEURONTIN) 300 MG capsule Take 4 capsules (1,200 mg) by mouth 3 times daily. 1080 capsule 3    HEMP OIL OR EXTRACT OR OTHER CBD CANNABINOID, NOT MEDICAL CANNABIS, THC      hydrochlorothiazide (HYDRODIURIL) 25 MG tablet Take 1 tablet (25 mg) by mouth daily. 90 tablet 3    levothyroxine (SYNTHROID/LEVOTHROID) 112 MCG tablet TAKE 1/2 TAB BY MOUTH EVERY DAY 45 tablet 3    lisinopril (ZESTRIL) 20 MG tablet Take 1 tablet (20 mg) by mouth 2 times daily. 180 tablet 3    MAGNESIUM GLYCINATE PO Take 400 mg by mouth at bedtime.      Melatonin 10 MG TABS tablet Take 10 mg by mouth nightly as needed.      Multiple Vitamin (MULTI-VITAMIN) per tablet Take 1 tablet by mouth every morning      omeprazole (PRILOSEC) 20 MG DR capsule Take 1 capsule (20 mg) by mouth daily. 90 capsule 3    ondansetron (ZOFRAN) 4 MG tablet Take 1 tablet (4 mg) by mouth every 6 hours as needed for nausea. 12 tablet 3    ondansetron (ZOFRAN-ODT) 4 MG ODT tab Take 1 tablet (4 mg) by mouth every 12 hours as needed for nausea 180 tablet 3    oxyCODONE (ROXICODONE) 5 MG tablet Take 1 tablet (5 mg) by mouth every 6 hours as needed for  severe pain. 28 tablet 0    spironolactone (ALDACTONE) 50 MG tablet Take 1 tablet (50 mg) by mouth daily at 2 pm. 90 tablet 3    tiZANidine (ZANAFLEX) 4 MG tablet Take 1 tablet (4 mg) by mouth at bedtime. (Patient taking differently: Take 4 mg by mouth nightly as needed for muscle spasms.) 90 tablet 3    Turmeric 500 MG CAPS Take 500 mg by mouth every morning        Allergies   Allergen Reactions    Erythromycin Nausea    Penicillins Hives     Around age 6 - doesn't recall full reaction, mother told her it was hives.     Has tolerated cephalsporins.       ROS:   Gen- no fevers/chills   Derm - no rash/ redness   Neuro - no numbness, no tingling   Remainder of ROS negative.     Exam:  deferred

## 2025-01-30 ENCOUNTER — OFFICE VISIT (OUTPATIENT)
Dept: ORTHOPEDICS | Facility: CLINIC | Age: 75
End: 2025-01-30
Payer: COMMERCIAL

## 2025-01-30 DIAGNOSIS — Z98.1 HISTORY OF LUMBAR FUSION: ICD-10-CM

## 2025-01-30 DIAGNOSIS — M53.3 PAIN OF BOTH SACROILIAC JOINTS: Primary | ICD-10-CM

## 2025-01-30 DIAGNOSIS — M53.3 SACROILIAC JOINT PAIN: Primary | ICD-10-CM

## 2025-01-30 RX ORDER — PREDNISONE 20 MG/1
TABLET ORAL
Qty: 18 TABLET | Refills: 0 | Status: SHIPPED | OUTPATIENT
Start: 2025-01-30 | End: 2025-02-15

## 2025-01-30 RX ORDER — OXYCODONE HYDROCHLORIDE 5 MG/1
5 TABLET ORAL 3 TIMES DAILY PRN
Qty: 10 TABLET | Refills: 0 | Status: SHIPPED | OUTPATIENT
Start: 2025-02-03 | End: 2025-02-06

## 2025-01-30 NOTE — LETTER
1/30/2025      RE: Nadira Perez  [address]  [place] [facility] [address]     Dear Colleague,    Thank you for referring your patient, Nadira Perez, to the [facility] [place]. Please see a copy of my visit note below.    ASSESSMENT/PLAN:    (M53.3) Pain of both sacroiliac joints  (primary encounter diagnosis)  Comment: will update imaging and refer back to Dr Saldivar for guided SI injections; pred taper and taper down on pain medication initiated today; virtual follow-up in 1 wk; precautions given  Plan: Spine  Referral, CT Pelvis Bone wo         Contrast, CT Lumbar Spine w/o Contrast,         predniSONE (DELTASONE) 20 MG tablet, oxyCODONE         (ROXICODONE) 5 MG tablet          (Z98.1) History of lumbar fusion  Comment: see above  Plan: Spine  Referral, CT Pelvis Bone wo Contrast, CT Lumbar Spine w/o Contrast          Sahil Kimball MD  January 30, 2025  9:20 AM      Pt is a 74 year old female last seen on 1/20/25 here for follow up of:   SI joint pain -  She reports radiating pain down her bilateral legs. Her pain has improved to a 6/10. She has been taking oxycodone for the pain She has been laying low this past week and reports weakness of her legs and balance issues.   Oxycodone #28 tabs sent on 1/27/25    Per my last note:  (M53.3) Sacroiliac pain  (primary encounter diagnosis)  Comment: exam, imaging reviewed w/ pt; no evidence of new acute bony injury to pelvis or known hardware; will reach out to pain clinic for consideration for repeat SI injection; small supply of norco provided; will reach out to PCP if additional medication is needed; f/up prn  Plan: XR Pelvis 1/2 Views, HYDROcodone-acetaminophen (NORCO) 5-325 MG tablet          (M79.674) Great toe pain, right  Comment: neg for fx  Plan: XR Toe Right G/E 2 Views    Past Medical History:   Diagnosis Date     Arthritis      Bone disease      Breast cancer (H)      Diabetes (H)      H/O  kyphoplasty      Hearing problem      History of blood transfusion      History of kidney stones      History of radiation therapy      Hyperlipemia      Hypertension      Hypopotassemia      Irregular heart beat      Kidney problem      Lymph edema      Medullary sponge kidney      Osteopenia      PONV (postoperative nausea and vomiting)      Reduced vision      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       Ascorbic Acid (VITAMIN C) 500 MG CHEW Take 1 tablet by mouth every morning       atorvastatin (LIPITOR) 80 MG tablet Take 1 tablet (80 mg) by mouth daily. 90 tablet 3     carvedilol (COREG) 12.5 MG tablet Take 1 tablet (12.5 mg) by mouth 2 times daily (with meals). 180 tablet 3     CRANBERRY EXTRACT PO Take 500 mg by mouth every morning       diclofenac (VOLTAREN) 1 % topical gel Apply 2 g topically 4 times daily 100 g 6     gabapentin (NEURONTIN) 300 MG capsule Take 4 capsules (1,200 mg) by mouth 3 times daily. 1080 capsule 3     HEMP OIL OR EXTRACT OR OTHER CBD CANNABINOID, NOT MEDICAL CANNABIS, THC       hydrochlorothiazide (HYDRODIURIL) 25 MG tablet Take 1 tablet (25 mg) by mouth daily. 90 tablet 3     levothyroxine (SYNTHROID/LEVOTHROID) 112 MCG tablet TAKE 1/2 TAB BY MOUTH EVERY DAY 45 tablet 3     lisinopril (ZESTRIL) 20 MG tablet Take 1 tablet (20 mg) by mouth 2 times daily. 180 tablet 3     MAGNESIUM GLYCINATE PO Take 400 mg by mouth at bedtime.       Melatonin 10 MG TABS tablet Take 10 mg by mouth nightly as needed.       Multiple Vitamin (MULTI-VITAMIN) per tablet Take 1 tablet by mouth every morning       omeprazole (PRILOSEC) 20 MG DR capsule Take 1 capsule (20 mg) by mouth daily. 90 capsule 3     ondansetron (ZOFRAN) 4  MG tablet Take 1 tablet (4 mg) by mouth every 6 hours as needed for nausea. 12 tablet 3     ondansetron (ZOFRAN-ODT) 4 MG ODT tab Take 1 tablet (4 mg) by mouth every 12 hours as needed for nausea 180 tablet 3     oxyCODONE (ROXICODONE) 5 MG tablet Take 1 tablet (5 mg) by mouth every 6 hours as needed for severe pain. 28 tablet 0     spironolactone (ALDACTONE) 50 MG tablet Take 1 tablet (50 mg) by mouth daily at 2 pm. 90 tablet 3     tiZANidine (ZANAFLEX) 4 MG tablet Take 1 tablet (4 mg) by mouth at bedtime. (Patient taking differently: Take 4 mg by mouth nightly as needed for muscle spasms.) 90 tablet 3     Turmeric 500 MG CAPS Take 500 mg by mouth every morning        Allergies   Allergen Reactions     Erythromycin Nausea     Penicillins Hives     Around age 6 - doesn't recall full reaction, mother told her it was hives.     Has tolerated cephalsporins.       ROS:   Gen- no fevers/chills   Derm - no rash/ redness   Neuro - no numbness, no tingling   Remainder of ROS negative.     Exam:  deferred      Again, thank you for allowing me to participate in the care of your patient.      Sincerely,    Sahil Kimball MD

## 2025-01-30 NOTE — PROGRESS NOTES
Patient underwent prior Bilateral sacroiliac joint steroid injections.  She has been seen and evaluated in the sports medicine clinic by Dr. Kimball and referred for repeat injections.  Case request placed today.

## 2025-01-31 ENCOUNTER — HOSPITAL ENCOUNTER (OUTPATIENT)
Dept: CT IMAGING | Facility: CLINIC | Age: 75
Discharge: HOME | End: 2025-01-31
Attending: FAMILY MEDICINE | Admitting: FAMILY MEDICINE
Payer: COMMERCIAL

## 2025-01-31 DIAGNOSIS — Z98.1 HISTORY OF LUMBAR FUSION: ICD-10-CM

## 2025-01-31 DIAGNOSIS — M53.3 PAIN OF BOTH SACROILIAC JOINTS: ICD-10-CM

## 2025-01-31 PROCEDURE — 72131 CT LUMBAR SPINE W/O DYE: CPT

## 2025-01-31 PROCEDURE — 72192 CT PELVIS W/O DYE: CPT

## 2025-02-04 ENCOUNTER — TELEPHONE (OUTPATIENT)
Dept: PHYSICAL MEDICINE AND REHAB | Facility: CLINIC | Age: 75
End: 2025-02-04
Payer: COMMERCIAL

## 2025-02-04 ENCOUNTER — TELEPHONE (OUTPATIENT)
Dept: PLASTIC SURGERY | Facility: CLINIC | Age: 75
End: 2025-02-04
Payer: COMMERCIAL

## 2025-02-04 PROBLEM — M53.3 SACROILIAC JOINT PAIN: Status: ACTIVE | Noted: 2024-08-22

## 2025-02-04 NOTE — TELEPHONE ENCOUNTER
Received call to reschedule surgery with: Dr. Espinal    Spoke with Ismael    Rescheduled surgery per patient request as she scheduled a pain injection the day before    Surgery Date: 6/4    Location of surgery: [facility]    Pre-operative H&P: patient confirmed they will schedule with MERCEDES Montes De Oca    Pre-surgical Consult:  5/14 at 3:00 PM    Additional Appointments: N/A, no additional visits requested    Post-operative Appointment w/[facility] or Surgeon: Shannan Jeffers PA-C 6/13 at 9:40 AM    Discussed with patient pre-op RN will call 2-3 days prior to surgery with arrival time and instructions:  Yes     Estimated Arrival time Discussed with Patient:  No    Standard Surgery Packet Sent w/updated dates: Yes - updated packet sent via US Mail    Additional Comments:  N/A       Caitlyn Florez on 2/4/2025 at 4:02 PM

## 2025-02-04 NOTE — TELEPHONE ENCOUNTER
Called patient to schedule procedure with Dr. Saldivar    Date of Procedure: 2/26/25    Arrival time given: Yes: Arrival Time 9:30am       Procedure Location: [facility]     Verified Location with Patient:  Yes  Address provided to the patient     Pre-op H&P Required:  No: Local anesthesia        Post-Op/Follow Up Appt:  Not Indicated in Request      Informed patient they will need a  to drive them home:  Yes    Patients : Spouse     Patient is aware that pre-op RN from the procedure center will call 2-3 days prior to scheduled procedure to confirm arrival time and review any instructions:  Yes       Additional Comments: N/A        Dory Bowman MA on 2/4/2025 at 11:35 AM      P: [phone]*

## 2025-02-26 ENCOUNTER — ANCILLARY PROCEDURE (OUTPATIENT)
Dept: RADIOLOGY | Facility: AMBULATORY SURGERY CENTER | Age: 75
End: 2025-02-26
Attending: PHYSICAL MEDICINE & REHABILITATION
Payer: COMMERCIAL

## 2025-02-26 ENCOUNTER — HOSPITAL ENCOUNTER (OUTPATIENT)
Facility: AMBULATORY SURGERY CENTER | Age: 75
Discharge: HOME | End: 2025-02-26
Attending: PHYSICAL MEDICINE & REHABILITATION | Admitting: PHYSICAL MEDICINE & REHABILITATION
Payer: COMMERCIAL

## 2025-02-26 VITALS
OXYGEN SATURATION: 97 % | HEIGHT: 65 IN | WEIGHT: 171 LBS | BODY MASS INDEX: 28.49 KG/M2 | TEMPERATURE: 97.2 F | RESPIRATION RATE: 14 BRPM | SYSTOLIC BLOOD PRESSURE: 140 MMHG | HEART RATE: 66 BPM | DIASTOLIC BLOOD PRESSURE: 81 MMHG

## 2025-02-26 DIAGNOSIS — M53.3 SACROILIAC JOINT PAIN: ICD-10-CM

## 2025-02-26 LAB — GLUCOSE BLDC GLUCOMTR-MCNC: 151 MG/DL (ref 70–99)

## 2025-02-26 PROCEDURE — G0260 INJ FOR SACROILIAC JT ANESTH: HCPCS

## 2025-02-26 RX ORDER — IOPAMIDOL 408 MG/ML
INJECTION, SOLUTION INTRATHECAL PRN
Status: DISCONTINUED | OUTPATIENT
Start: 2025-02-26 | End: 2025-02-26 | Stop reason: HOSPADM

## 2025-02-26 RX ORDER — LIDOCAINE HYDROCHLORIDE 10 MG/ML
INJECTION, SOLUTION EPIDURAL; INFILTRATION; INTRACAUDAL; PERINEURAL PRN
Status: DISCONTINUED | OUTPATIENT
Start: 2025-02-26 | End: 2025-02-26 | Stop reason: HOSPADM

## 2025-02-26 RX ORDER — TRIAMCINOLONE ACETONIDE 40 MG/ML
INJECTION, SUSPENSION INTRA-ARTICULAR; INTRAMUSCULAR PRN
Status: DISCONTINUED | OUTPATIENT
Start: 2025-02-26 | End: 2025-02-26 | Stop reason: HOSPADM

## 2025-02-26 NOTE — DISCHARGE INSTRUCTIONS
Home Care Instructions after a Sacroiliac Joint Injection        Activity  -You may resume most normal activity levels with the exception of strenuous activity. It is important for us to know if your pain with normal activity is relieved after this injection.  -DO NOT shower for 24 hours  -DO NOT remove bandaid for 24 hours    Pain  -You may experience soreness at the injection site for one or two days  -You may use an ice pack for 20 minutes every 2 hours for the first 24 hours  -You may use a heating pad after the first 24 hours  -You may use Tylenol (acetaminophen) every 4 hours or other pain medicines as directed by your physician    You may experience numbness radiating into your legs or arms (depending on the procedure location). This numbness may last several hours. Until sensation returns to normal; please use caution in walking, climbing stairs, and stepping out of your vehicle, etc.      Common side effects of steroids:  Not everyone will experience corticosteroid side effects. If side effects are experienced, they will gradually subside in the 7-10 day period following an injection. Most common side effects include:  -Flushed face and/or chest  -Feeling of warmth, particularly in the face but could be an overall feeling of warmth  -Increased blood sugar in diabetic patients  -Menstrual irregularities my occur. If taking hormone-based birth control an alternate method of birth control is recommended  -Sleep disturbances and/or mood swings are possible  -Leg cramps      PLEASE KEEP TRACK OF YOUR SYMPTOMS AND NOTE YOUR IMPROVEMENT FOR YOUR DOCTOR.     Please contact us if you have:  -Severe pain  -Fever more than 101.5 degrees Fahrenheit  -Signs of infection at the injection site (redness, swelling, or drainage)    FOR PM&R PATIENTS:  For patients seen by the PM and R service, please call [phone]. (Monday through Friday 8a-5p.  After business hours and weekends call [phone] and ask for the PM and R  doctor on call). If you need to fax a pain diary to PM&R the fax number is [phone]. If you are unable to fax, uploading to [employer] is encouraged, then send to provider. If you have procedure scheduling questions please call [phone] Option #2.

## 2025-02-26 NOTE — OP NOTE
PROCEDURE NOTE: Sacroiliac Joint Injection    PROCEDURE DATE: 2/26/2025    PATIENT NAME: Nadira Perez  YOB: 1950    ATTENDING PHYSICIAN: Thais Saldivar MD   RESIDENT/FELLOW: Jacinto Conte, PGY-III    PREOPERATIVE DIAGNOSIS: Bilateral Sacroiliac Joint Pain  POSTOPERATIVE DIAGNOSIS: Same    PROCEDURE PERFORMED: Bilateral Sacroiliac joint injection with fluoroscopic guidance      FLUOROSCOPY WAS USED.     INDICATIONS FOR PROCEDURE:   Nadira Perez is a 74 year old female with a clinical picture consistent with Bilateral sacroiliac joint pain.    PROCEDURE AND FINDINGS:    She was greeted in the pre-procedure holding area. The risk, benefits and alternatives to the procedure were again reviewed with the patient and written informed consent was placed in the chart. An IV line was not placed.  A 500 mL bag of NS was not connected to the patient. She was taken to the procedure room and positioned prone on the fluoroscopy table.  Routine monitors were applied including EKG leads (if sedation was used), blood pressure cuff, and pulse oximetry. Prior to the procedure a time out was completed, verifying correct patient, procedure, site, positioning, and implants and/or special equipment.     The skin was prepped with chlorhexidine and draped in the usual sterile fashion.  A  film was taken to identify the Bilateral sacroiliac joint and the inferior most intersection of the anterior and posterior sacroiliac joint lines. The overlying skin and subcutaneous tissues were anesthetized using a 27-gauge 1-1/4 inch needle with 1% preservative-free lidocaine for a total volume of 1.5 mls. A 22-gauge 3.5-inch Quincke spinal needle was advanced into the Bilateral sacroiliac joint space under intermittent fluoroscopic guidance. Needle position was confirmed on both AP and lateral views.     Then, after negative aspiration, 0.5mL Isovue-M contrast was injected and confirmed adequate intraarticular spread.  There was no evidence of intravascular uptake. At this point, after negative aspiration, a total of 2.5mL volume of treatment injectate, consisting of 1mL Kenalog (40mg/mL) and 1.5mL of 1% Lidocaine, was injected easily per side.  The needle was then flushed with 0.5 ml of local anesthetic and removed. The needle insertion site was dressed appropriately.     Nadira Perez was taken to the recovery room where she was monitored for a brief period of time. She tolerated the procedure well and was discharged home in stable condition with post procedural instructions.     Before the procedure, she reported a pain score of 8/10.   After the procedure, she reported a pain score of 4/10.     Follow-up will be in clinic     COMPLICATIONS: None    COMMENTS: None

## 2025-03-04 ENCOUNTER — VIRTUAL VISIT (OUTPATIENT)
Dept: ORTHOPEDICS | Facility: CLINIC | Age: 75
End: 2025-03-04
Payer: COMMERCIAL

## 2025-03-04 DIAGNOSIS — M53.3 PAIN OF BOTH SACROILIAC JOINTS: ICD-10-CM

## 2025-03-04 PROCEDURE — 98006 SYNCH AUDIO-VIDEO EST MOD 30: CPT | Performed by: FAMILY MEDICINE

## 2025-03-04 RX ORDER — OXYCODONE HYDROCHLORIDE 5 MG/1
5 TABLET ORAL 2 TIMES DAILY PRN
Qty: 45 TABLET | Refills: 0 | Status: SHIPPED | OUTPATIENT
Start: 2025-03-04

## 2025-03-04 ASSESSMENT — ANXIETY QUESTIONNAIRES
GAD7 TOTAL SCORE: 6
1. FEELING NERVOUS, ANXIOUS, OR ON EDGE: SEVERAL DAYS
GAD7 TOTAL SCORE: 6
7. FEELING AFRAID AS IF SOMETHING AWFUL MIGHT HAPPEN: SEVERAL DAYS
IF YOU CHECKED OFF ANY PROBLEMS ON THIS QUESTIONNAIRE, HOW DIFFICULT HAVE THESE PROBLEMS MADE IT FOR YOU TO DO YOUR WORK, TAKE CARE OF THINGS AT HOME, OR GET ALONG WITH OTHER PEOPLE: SOMEWHAT DIFFICULT
7. FEELING AFRAID AS IF SOMETHING AWFUL MIGHT HAPPEN: SEVERAL DAYS
3. WORRYING TOO MUCH ABOUT DIFFERENT THINGS: NOT AT ALL
2. NOT BEING ABLE TO STOP OR CONTROL WORRYING: NOT AT ALL
8. IF YOU CHECKED OFF ANY PROBLEMS, HOW DIFFICULT HAVE THESE MADE IT FOR YOU TO DO YOUR WORK, TAKE CARE OF THINGS AT HOME, OR GET ALONG WITH OTHER PEOPLE?: SOMEWHAT DIFFICULT
4. TROUBLE RELAXING: MORE THAN HALF THE DAYS
GAD7 TOTAL SCORE: 6
5. BEING SO RESTLESS THAT IT IS HARD TO SIT STILL: SEVERAL DAYS
6. BECOMING EASILY ANNOYED OR IRRITABLE: SEVERAL DAYS

## 2025-03-04 ASSESSMENT — PATIENT HEALTH QUESTIONNAIRE - PHQ9
10. IF YOU CHECKED OFF ANY PROBLEMS, HOW DIFFICULT HAVE THESE PROBLEMS MADE IT FOR YOU TO DO YOUR WORK, TAKE CARE OF THINGS AT HOME, OR GET ALONG WITH OTHER PEOPLE: SOMEWHAT DIFFICULT
SUM OF ALL RESPONSES TO PHQ QUESTIONS 1-9: 9
SUM OF ALL RESPONSES TO PHQ QUESTIONS 1-9: 9

## 2025-03-04 NOTE — PROGRESS NOTES
TELEPHONE VISIT   Call start: 9:55 AM  Call stop: 10:06AM  Call duration: 11 min  [place] location[place]  [place] [facility] used: [facility] - attempted video visit but audio did not work so we changed to telephone encounter    ASSESSMENT/PLAN:    (M53.3) Pain of both sacroiliac joints  Comment: stable; will continue wean; f/up in 4 wks  Plan: oxyCODONE (ROXICODONE) 5 MG tablet    Telephone encounter was performed today because the audio did not work during attempted video visit so we stopped and did a telephone call instead.          Sahil Kimball MD  March 4, 2025  10:48 AM      Pt is a 74 year old female last seen on 2/6/25 here for follow up of:     Pain in SI joints - still having issues. Down to oxycodone bid. Does try to take 1/ day   Does note some intermittent tingling down legs  Does have numbness due to chronic neuropathy  Shooting pain down R leg   Has started some pilates - very gentle     R-sided rib/flank pain - no dysuria, no blood in urine  No abd pain  Has been losing some weight, appetite not as good     Per my last note:  (M53.3) Pain of both sacroiliac joints  Comment: pain is improved; will start wean down off her pain meds; virtual follow-up again on 3/4/25  Plan: oxyCODONE (ROXICODONE) 5 MG tablet    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      Ascorbic Acid (VITAMIN C) 500 MG CHEW Take 1 tablet by mouth every morning      atorvastatin (LIPITOR) 80 MG tablet Take 1 tablet (80 mg) by mouth daily. 90 tablet 3    carvedilol (COREG) 12.5 MG tablet Take 1 tablet (12.5 mg) by mouth 2 times daily (with meals). 180 tablet 3    CRANBERRY EXTRACT PO Take 500 mg by mouth every morning      diclofenac (VOLTAREN) 1 % topical gel Apply 2 g topically 4 times daily 100 g 6    gabapentin (NEURONTIN) 300 MG capsule Take 4 capsules (1,200 mg) by mouth 3 times daily. 1080 capsule 3    HEMP OIL OR EXTRACT OR OTHER CBD CANNABINOID, NOT MEDICAL CANNABIS, THC      hydrochlorothiazide (HYDRODIURIL) 25 MG tablet Take 1 tablet (25 mg) by mouth daily. 90 tablet 3    levothyroxine (SYNTHROID/LEVOTHROID) 112 MCG tablet TAKE 1/2 TAB BY MOUTH EVERY DAY 45 tablet 3    lisinopril (ZESTRIL) 20 MG tablet Take 1 tablet (20 mg) by mouth 2 times daily. 180 tablet 3    MAGNESIUM GLYCINATE PO Take 400 mg by mouth at bedtime.      Melatonin 10 MG TABS tablet Take 10 mg by mouth nightly as needed.      Multiple Vitamin (MULTI-VITAMIN) per tablet Take 1 tablet by mouth every morning      omeprazole (PRILOSEC) 20 MG DR capsule Take 1 capsule (20 mg) by mouth daily. 90 capsule 3    ondansetron (ZOFRAN) 4 MG tablet Take 1 tablet (4 mg) by mouth every 6 hours as needed for nausea. 12 tablet 3    ondansetron (ZOFRAN-ODT) 4 MG ODT tab Take 1 tablet (4 mg) by mouth every 12 hours as needed for nausea 180 tablet 3    oxyCODONE (ROXICODONE) 5 MG tablet Take 1 tablet (5 mg) by mouth 3 times daily as needed for severe pain for 7 days, THEN 1 tablet (5 mg) 2 times daily as needed for severe pain. 61 tablet 0    spironolactone (ALDACTONE) 50 MG tablet Take 1 tablet (50 mg) by mouth daily at 2 pm. 90 tablet 3    tiZANidine (ZANAFLEX) 4 MG tablet Take 1 tablet (4 mg) by mouth at bedtime. (Patient taking differently:  Take 4 mg by mouth nightly as needed for muscle spasms.) 90 tablet 3    Turmeric 500 MG CAPS Take 500 mg by mouth every morning        Allergies   Allergen Reactions    Erythromycin Nausea    Penicillins Hives     Around age 6 - doesn't recall full reaction, mother told her it was hives.     Has tolerated cephalsporins.       ROS:   Gen- no fevers/chills   Rheum - no morning stiffness   Derm - no rash/ redness   Neuro - see HPI   Remainder of ROS negative.     Exam: deferred as telephone

## 2025-03-04 NOTE — LETTER
3/4/2025       RE: Nadira Perez  [address]  [place] [address]     Dear Colleague,    Thank you for referring your patient, Nadira Perez, to the [facility] [place] [facility]. Please see a copy of my visit note below.    TELEPHONE VISIT   Call start: 9:55 AM  Call stop: 10:06AM  Call duration: 11 min  Pt location: Home  Provider location: [facility] [place]  Service used: [facility] - attempted but audio did not work    ASSESSMENT/PLAN:    (M53.3) Pain of both sacroiliac joints  Comment: stable; will continue wean; f/up in 4 wks  Plan: oxyCODONE (ROXICODONE) 5 MG tablet          Sahil Kimball MD  March 4, 2025  10:48 AM      Pt is a 74 year old female last seen on 2/6/25 here for follow up of:     Pain in SI joints - still having issues. Down to oxycodone bid. Does try to take 1/ day   Does note some intermittent tingling down legs  Does have numbness due to chronic neuropathy  Shooting pain down R leg   Has started some pilates - very gentle     R-sided rib/flank pain - no dysuria, no blood in urine  No abd pain  Has been losing some weight, appetite not as good     Per my last note:  (M53.3) Pain of both sacroiliac joints  Comment: pain is improved; will start wean down off her pain meds; virtual follow-up again on 3/4/25  Plan: oxyCODONE (ROXICODONE) 5 MG tablet    Past Medical History:   Diagnosis Date     Arthritis      Bone disease      Breast cancer (H)      Diabetes (H)      H/O kyphoplasty      Hearing problem      History of blood transfusion      History of kidney stones      History of radiation therapy      Hyperlipemia      Hypertension      Hypopotassemia      Irregular heart beat      Kidney problem      Lymph edema      Medullary sponge kidney      Osteopenia      PONV (postoperative nausea and vomiting)      Reduced vision      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       Ascorbic Acid (VITAMIN C) 500 MG CHEW Take 1 tablet by mouth every morning       atorvastatin (LIPITOR) 80 MG tablet Take 1 tablet (80 mg) by mouth daily. 90 tablet 3     carvedilol (COREG) 12.5 MG tablet Take 1 tablet (12.5 mg) by mouth 2 times daily (with meals). 180 tablet 3     CRANBERRY EXTRACT PO Take 500 mg by mouth every morning       diclofenac (VOLTAREN) 1 % topical gel Apply 2 g topically 4 times daily 100 g 6     gabapentin (NEURONTIN) 300 MG capsule Take 4 capsules (1,200 mg) by mouth 3 times daily. 1080 capsule 3     HEMP OIL OR EXTRACT OR OTHER CBD CANNABINOID, NOT MEDICAL CANNABIS, THC       hydrochlorothiazide (HYDRODIURIL) 25 MG tablet Take 1 tablet (25 mg) by mouth daily. 90 tablet 3     levothyroxine (SYNTHROID/LEVOTHROID) 112 MCG tablet TAKE 1/2 TAB BY MOUTH EVERY DAY 45 tablet 3     lisinopril (ZESTRIL) 20 MG tablet Take 1 tablet (20 mg) by mouth 2 times daily. 180 tablet 3     MAGNESIUM GLYCINATE PO Take 400 mg by mouth at bedtime.       Melatonin 10 MG TABS tablet Take 10 mg by mouth nightly as needed.       Multiple Vitamin (MULTI-VITAMIN) per tablet Take 1 tablet by mouth every morning       omeprazole (PRILOSEC) 20 MG DR capsule Take 1 capsule (20 mg) by mouth daily. 90 capsule 3     ondansetron (ZOFRAN) 4 MG tablet Take 1 tablet (4 mg) by mouth every 6 hours as needed for nausea. 12 tablet 3     ondansetron (ZOFRAN-ODT) 4 MG ODT tab Take 1 tablet (4 mg) by mouth every 12 hours as needed for nausea 180 tablet 3     oxyCODONE (ROXICODONE) 5 MG tablet Take 1 tablet (5 mg) by mouth 3 times daily as needed for severe pain for 7 days, THEN 1 tablet (5 mg) 2 times daily as needed for severe pain. 61 tablet 0     spironolactone (ALDACTONE) 50 MG tablet Take 1  tablet (50 mg) by mouth daily at 2 pm. 90 tablet 3     tiZANidine (ZANAFLEX) 4 MG tablet Take 1 tablet (4 mg) by mouth at bedtime. (Patient taking differently: Take 4 mg by mouth nightly as needed for muscle spasms.) 90 tablet 3     Turmeric 500 MG CAPS Take 500 mg by mouth every morning        Allergies   Allergen Reactions     Erythromycin Nausea     Penicillins Hives     Around age 6 - doesn't recall full reaction, mother told her it was hives.     Has tolerated cephalsporins.       ROS:   Gen- no fevers/chills   Rheum - no morning stiffness   Derm - no rash/ redness   Neuro - see HPI   Remainder of ROS negative.     Exam: deferred as telephone      Again, thank you for allowing me to participate in the care of your patient.      Sincerely,    Sahil Kimball MD

## 2025-03-10 ENCOUNTER — TELEPHONE (OUTPATIENT)
Dept: CARDIOLOGY | Facility: CLINIC | Age: 75
End: 2025-03-10
Payer: COMMERCIAL

## 2025-03-10 NOTE — TELEPHONE ENCOUNTER
Left Voicemail (1st Attempt) and Sent Mychart (1st Attempt) for the patient to call back and schedule the following:    Appointment type: RTN CARDIO-ONC  Provider: BABAR  Return date: 7/6/2025 OR AFTER  Specialty phone number: [phone] OPTION 1  Additional appointment(s) needed: N/A  Additonal Notes: SIX MONTH FOLLOW-UP

## 2025-03-12 NOTE — TELEPHONE ENCOUNTER
Patient Contacted for the patient to call back and schedule the following:    Appointment type: RTN CARDIO-ONC  Provider: BABAR  Return date: 9/22/2025  Specialty phone number: [phone] OPTION 1  Additional appointment(s) needed: N/A  Additonal Notes: SIX MONTH FOLLOW-UP

## 2025-03-14 ENCOUNTER — LAB (OUTPATIENT)
Dept: LAB | Facility: CLINIC | Age: 75
End: 2025-03-14
Attending: STUDENT IN AN ORGANIZED HEALTH CARE EDUCATION/TRAINING PROGRAM
Payer: COMMERCIAL

## 2025-03-14 DIAGNOSIS — I10 ESSENTIAL HYPERTENSION, BENIGN: ICD-10-CM

## 2025-03-14 DIAGNOSIS — E11.9 TYPE 2 DIABETES MELLITUS WITHOUT COMPLICATION, WITHOUT LONG-TERM CURRENT USE OF INSULIN (H): ICD-10-CM

## 2025-03-14 LAB
ALBUMIN SERPL BCG-MCNC: 4.3 G/DL (ref 3.5–5.2)
ALP SERPL-CCNC: 81 U/L (ref 40–150)
ALT SERPL W P-5'-P-CCNC: 23 U/L (ref 0–50)
ANION GAP SERPL CALCULATED.3IONS-SCNC: 10 MMOL/L (ref 7–15)
AST SERPL W P-5'-P-CCNC: 27 U/L (ref 0–45)
BASOPHILS # BLD AUTO: 0.1 10E3/UL (ref 0–0.2)
BASOPHILS NFR BLD AUTO: 1 %
BILIRUB SERPL-MCNC: 0.4 MG/DL
BUN SERPL-MCNC: 41.7 MG/DL (ref 8–23)
CALCIUM SERPL-MCNC: 9.4 MG/DL (ref 8.8–10.4)
CHLORIDE SERPL-SCNC: 102 MMOL/L (ref 98–107)
CREAT SERPL-MCNC: 1.28 MG/DL (ref 0.51–0.95)
EGFRCR SERPLBLD CKD-EPI 2021: 44 ML/MIN/1.73M2
EOSINOPHIL # BLD AUTO: 0.2 10E3/UL (ref 0–0.7)
EOSINOPHIL NFR BLD AUTO: 3 %
ERYTHROCYTE [DISTWIDTH] IN BLOOD BY AUTOMATED COUNT: 14.5 % (ref 10–15)
EST. AVERAGE GLUCOSE BLD GHB EST-MCNC: 134 MG/DL
GLUCOSE SERPL-MCNC: 103 MG/DL (ref 70–99)
HBA1C MFR BLD: 6.3 %
HCO3 SERPL-SCNC: 27 MMOL/L (ref 22–29)
HCT VFR BLD AUTO: 35.2 % (ref 35–47)
HGB BLD-MCNC: 11.7 G/DL (ref 11.7–15.7)
IMM GRANULOCYTES # BLD: 0 10E3/UL
IMM GRANULOCYTES NFR BLD: 1 %
LYMPHOCYTES # BLD AUTO: 1.5 10E3/UL (ref 0.8–5.3)
LYMPHOCYTES NFR BLD AUTO: 18 %
MCH RBC QN AUTO: 29.3 PG (ref 26.5–33)
MCHC RBC AUTO-ENTMCNC: 33.2 G/DL (ref 31.5–36.5)
MCV RBC AUTO: 88 FL (ref 78–100)
MONOCYTES # BLD AUTO: 0.7 10E3/UL (ref 0–1.3)
MONOCYTES NFR BLD AUTO: 9 %
NEUTROPHILS # BLD AUTO: 5.8 10E3/UL (ref 1.6–8.3)
NEUTROPHILS NFR BLD AUTO: 69 %
NRBC # BLD AUTO: 0 10E3/UL
NRBC BLD AUTO-RTO: 0 /100
PLATELET # BLD AUTO: 291 10E3/UL (ref 150–450)
POTASSIUM SERPL-SCNC: 4 MMOL/L (ref 3.4–5.3)
PROT SERPL-MCNC: 6.6 G/DL (ref 6.4–8.3)
RBC # BLD AUTO: 3.99 10E6/UL (ref 3.8–5.2)
SODIUM SERPL-SCNC: 139 MMOL/L (ref 135–145)
TSH SERPL DL<=0.005 MIU/L-ACNC: 1.54 UIU/ML (ref 0.3–4.2)
WBC # BLD AUTO: 8.4 10E3/UL (ref 4–11)

## 2025-03-14 PROCEDURE — 82247 BILIRUBIN TOTAL: CPT

## 2025-03-14 PROCEDURE — 85025 COMPLETE CBC W/AUTO DIFF WBC: CPT

## 2025-03-14 PROCEDURE — 36415 COLL VENOUS BLD VENIPUNCTURE: CPT

## 2025-03-14 PROCEDURE — 84443 ASSAY THYROID STIM HORMONE: CPT

## 2025-03-14 PROCEDURE — 84155 ASSAY OF PROTEIN SERUM: CPT

## 2025-03-14 PROCEDURE — 83036 HEMOGLOBIN GLYCOSYLATED A1C: CPT

## 2025-03-14 NOTE — NURSING NOTE
Chief Complaint   Patient presents with    Blood Draw     Vpt blood draw by lab RN     Venipuncture labs drawn by lab RN.    Gia Medina RN

## 2025-03-18 ENCOUNTER — VIRTUAL VISIT (OUTPATIENT)
Dept: INTERNAL MEDICINE | Facility: CLINIC | Age: 75
End: 2025-03-18
Payer: COMMERCIAL

## 2025-03-18 DIAGNOSIS — M81.0 AGE-RELATED OSTEOPOROSIS WITHOUT CURRENT PATHOLOGICAL FRACTURE: ICD-10-CM

## 2025-03-18 DIAGNOSIS — Z78.0 POST-MENOPAUSAL: Primary | ICD-10-CM

## 2025-03-18 DIAGNOSIS — Z78.0 POST-MENOPAUSE: ICD-10-CM

## 2025-03-18 DIAGNOSIS — R79.89 ELEVATED SERUM CREATININE: ICD-10-CM

## 2025-03-18 DIAGNOSIS — T45.2X1A POISONING BY VITAMIN D, ACCIDENTAL OR UNINTENTIONAL, INITIAL ENCOUNTER: ICD-10-CM

## 2025-03-18 DIAGNOSIS — Z91.89: ICD-10-CM

## 2025-03-18 PROCEDURE — 98013 SYNCH AUDIO-ONLY EST LOW 20: CPT | Performed by: NURSE PRACTITIONER

## 2025-03-18 PROCEDURE — 1125F AMNT PAIN NOTED PAIN PRSNT: CPT | Performed by: NURSE PRACTITIONER

## 2025-03-18 NOTE — PROGRESS NOTES
Ismael is a 74 year old who is being evaluated via a billable phone visit.    How would you like to obtain your AVS? MyChart  If the phone visit is dropped, the invitation should be resent by: Text to cell phone: [phone]  Will anyone else be joining your phonevisit? No      Assessment & Plan   Problem List Items Addressed This Visit          Active Problems       Post-menopausal    -  Primary   Relevant Orders    Vitamin D Deficiency     Other Visit Diagnoses       Elevated serum creatinine        Relevant Orders    Basic metabolic panel  (Ca, Cl, CO2, Creat, Gluc, K, Na, BUN)     I spent a total of  28  minutes in the care of this pt during today's office visit. This time includes reviewing the patient's chart and prior history, obtaining a history, performing an examination and evaluation and counseling the patient. This time also includes ordering medications or tests necessary in addition to communication to other member's of the patient's health care team. Time spent in documentation and care coordination is included.     Matilde LONG, BLUE             Subjective   Ismael is a 74 year old, presenting for the following health issues:  RECHECK and Results      Telephone Start Time: 8:53 am  She was scheduled for a video visit which was changed to a phone visit per pt. Request.     Nadira Perez is a 74 year old female who presents for a telephone visit.  She received lab results with an elevated Cr of 1.28/GFR44.     She is feeling some benefit from the SI joint injections she received > 2 weeks ago and her pain is now a 4/10 rather than 8/10.     She states she is taking 6000 units of vitamin D daily.    History of Present Illness       Reason for visit:  Abnormal lab values  Symptom onset:  More than a month  Symptoms include:  None R/T abnormal lab values  Symptom intensity:  Mild  Symptom progression:  Staying the same  Had these symptoms before:  No   She is taking medications regularly.         Current Outpatient Medications:     acetaminophen (TYLENOL) 500 MG tablet, Take 1,000 mg by mouth 3 times daily, Disp: , Rfl:     artificial tears OINT ophthalmic ointment, Place into both eyes nightly as needed for dry eyes., Disp: , Rfl:     Ascorbic Acid (VITAMIN C) 500 MG CHEW, Take 1 tablet by mouth every morning, Disp: , Rfl:     atorvastatin (LIPITOR) 80 MG tablet, Take 1 tablet (80 mg) by mouth daily., Disp: 90 tablet, Rfl: 3    carvedilol (COREG) 12.5 MG tablet, Take 1 tablet (12.5 mg) by mouth 2 times daily (with meals)., Disp: 180 tablet, Rfl: 3    CRANBERRY EXTRACT PO, Take 500 mg by mouth every morning, Disp: , Rfl:     diclofenac (VOLTAREN) 1 % topical gel, Apply 2 g topically 4 times daily, Disp: 100 g, Rfl: 6    gabapentin (NEURONTIN) 300 MG capsule, Take 4 capsules (1,200 mg) by mouth 3 times daily., Disp: 1080 capsule, Rfl: 3    HEMP OIL OR EXTRACT OR OTHER CBD CANNABINOID, NOT MEDICAL CANNABIS,, THC, Disp: , Rfl:     hydrochlorothiazide (HYDRODIURIL) 25 MG tablet, Take 1 tablet (25 mg) by mouth daily., Disp: 90 tablet, Rfl: 3    levothyroxine (SYNTHROID/LEVOTHROID) 112 MCG tablet, TAKE 1/2 TAB BY MOUTH EVERY DAY, Disp: 45 tablet, Rfl: 3    lisinopril (ZESTRIL) 20 MG tablet, Take 1 tablet (20 mg) by mouth 2 times daily., Disp: 180 tablet, Rfl: 3    MAGNESIUM GLYCINATE PO, Take 400 mg by mouth at bedtime., Disp: , Rfl:     Melatonin 10 MG TABS tablet, Take 10 mg by mouth nightly as needed., Disp: , Rfl:     Multiple Vitamin (MULTI-VITAMIN) per tablet, Take 1 tablet by mouth every morning, Disp: , Rfl:     omeprazole (PRILOSEC) 20 MG DR capsule, Take 1 capsule (20 mg) by mouth daily., Disp: 90 capsule, Rfl: 3    ondansetron (ZOFRAN) 4 MG tablet, Take 1 tablet (4 mg) by mouth every 6 hours as needed for nausea., Disp: 12 tablet, Rfl: 3    ondansetron (ZOFRAN-ODT) 4 MG ODT tab, Take 1 tablet (4 mg) by mouth every 12 hours as needed for nausea, Disp: 180 tablet, Rfl: 3    oxyCODONE (ROXICODONE) 5 MG  tablet, Take 1 tablet (5 mg) by mouth 2 times daily as needed for severe pain., Disp: 45 tablet, Rfl: 0    spironolactone (ALDACTONE) 50 MG tablet, Take 1 tablet (50 mg) by mouth daily at 2 pm., Disp: 90 tablet, Rfl: 3    tiZANidine (ZANAFLEX) 4 MG tablet, Take 1 tablet (4 mg) by mouth at bedtime. (Patient taking differently: Take 4 mg by mouth nightly as needed for muscle spasms.), Disp: 90 tablet, Rfl: 3    Turmeric 500 MG CAPS, Take 500 mg by mouth every morning, Disp: , Rfl:     Current Facility-Administered Medications:     romosozumab-aqqg (EVENITY) injection 210 mg, 210 mg, Subcutaneous, Q30 Days, Cortney Clark MD, 210 mg at 04/12/23 1307             Objective    Vitals - Patient Reported  Systolic (Patient Reported): (!) 142  Diastolic (Patient Reported): (!) 103  Pulse (Patient Reported): 80  Pain Score: Moderate Pain (4)  Pain Loc: Low Back      Vitals:  No vitals were obtained today due to virtual visit.    Physical Exam   No exam performed.  She was alert, oriented, appropriate.          Video-Visit Details    Type of service:  Video Visit   Video End Time: 9:11  Originating Location (pt. Location): Home    Distant Location (provider location):  On-site  Platform used for Video Visit: Harpreet  Signed Electronically by: MERCEDES Kyle CNP

## 2025-03-18 NOTE — PATIENT INSTRUCTIONS
Thank you for visiting the Primary Care Center today at the [facility]! The following is some information about our clinic:     Primary Care Center Frequently-Asked Questions    (1) How do I schedule appointments at the [facility]?     Primary Care--to schedule or make changes to an existing appointment, please call our primary care line at [phone].    Labs--to schedule a lab appointment at the [facility] you can use [employer] or call [phone]. If you have a [place] location that is closer to home, you can reach out to that location for scheduling options.     Imaging--if you need to schedule a CT, X-ray, MRI, ultrasound, or other imaging study you can call [phone] to schedule at the [facility] or any other [facility] imaging location.     Referrals--if a referral to another specialty was ordered you can expect a phone call from their scheduling team. If you have not heard from them in a week, please call us or send us a [employer] message to check the status or get a scheduling number. Please note that this only applies to internal [facility] referrals. If the referral is external you would need to contact their office for scheduling.     (2) I have a question about my visit, who do I contact?     You can call us at the primary care line at [phone] to ask questions about your visit. You can also send a secure message through [employer], which is reviewed by clinic staff. Please note that [employer] messages have a twenty-four to forty-eight business hour turnaround time and should not be used for urgent concerns.    (3) How will I get the results of my tests?    If you are signed up for [employer] all tests will be released to you within twenty-four hours of resulting. Please allow three to five days for your doctor to review your results and place a note interpreting the results. If you do not have [employer] you will receive your  results through mail seven to ten business days following the return of the tests. Please note that if there should be any urgent or concerning results that your doctor or their registered nurse will reach out to you the same day as the tests come back. If you have follow up questions about your results or would like to discuss the results in detail please schedule a follow up with your provider either in person or virtually.     (4) How do I get refills of my prescriptions?     You should always first contact your pharmacy for refills of your medications. If submitting a refill request on [employer], please be sure to submit the request only once--repeat requests can cause delays in refill. If you are requesting a NEW medication or a medication related to new symptoms you will need to schedule an appointment with a provider prior to approval. Please note: Routine medication refills have up to one to three business day turnaround whereas controlled substances refills have up to five to seven business day turnaround.    (5) I have new symptoms, what do I do?     If you are having an immediate medical emergency, you should dial 911 for assistance.   For anything urgent that needs to be seen within a few hours to one day you should visit a local urgent care for assistance.  For non-urgent symptoms that need to be seen within a few days to a week you can schedule with an available provider in primary care by going to [employer] or calling [phone].   If you are not sure how serious your symptoms are or you would like to receive medical advice you can always call [phone] to speak with a triage nurse.

## 2025-03-25 NOTE — NURSING NOTE
----- Message from Chi Jerome MD sent at 3/25/2025  2:51 PM CDT -----  Please let patient/family know that lung scan was stable   Nadira Perez is a 71 year old female patient that presents today in clinic for the following:    Chief Complaint   Patient presents with     Physical     Patient comes in for a physical.      The patient's allergies and medications were reviewed as noted. A set of vitals were recorded as noted without incident. The patient does not have any other questions for the provider.    Nas Claros, EMT at 11:00 AM on 10/28/2021

## 2025-03-30 ENCOUNTER — LAB (OUTPATIENT)
Dept: LAB | Facility: CLINIC | Age: 75
End: 2025-03-30
Payer: COMMERCIAL

## 2025-03-30 DIAGNOSIS — M81.0 AGE-RELATED OSTEOPOROSIS WITHOUT CURRENT PATHOLOGICAL FRACTURE: ICD-10-CM

## 2025-03-30 DIAGNOSIS — R79.89 ELEVATED SERUM CREATININE: ICD-10-CM

## 2025-03-30 LAB
ANION GAP SERPL CALCULATED.3IONS-SCNC: 13 MMOL/L (ref 7–15)
BUN SERPL-MCNC: 28.2 MG/DL (ref 8–23)
CALCIUM SERPL-MCNC: 9 MG/DL (ref 8.8–10.4)
CHLORIDE SERPL-SCNC: 102 MMOL/L (ref 98–107)
CREAT SERPL-MCNC: 1.02 MG/DL (ref 0.51–0.95)
EGFRCR SERPLBLD CKD-EPI 2021: 58 ML/MIN/1.73M2
GLUCOSE SERPL-MCNC: 106 MG/DL (ref 70–99)
HCO3 SERPL-SCNC: 23 MMOL/L (ref 22–29)
HOLD SPECIMEN: NORMAL
POTASSIUM SERPL-SCNC: 3.8 MMOL/L (ref 3.4–5.3)
SODIUM SERPL-SCNC: 138 MMOL/L (ref 135–145)
VIT D+METAB SERPL-MCNC: 59 NG/ML (ref 20–50)

## 2025-03-30 PROCEDURE — 36415 COLL VENOUS BLD VENIPUNCTURE: CPT

## 2025-03-30 PROCEDURE — 82306 VITAMIN D 25 HYDROXY: CPT

## 2025-03-30 PROCEDURE — 80048 BASIC METABOLIC PNL TOTAL CA: CPT

## 2025-03-31 ENCOUNTER — MYC MEDICAL ADVICE (OUTPATIENT)
Dept: INTERNAL MEDICINE | Facility: CLINIC | Age: 75
End: 2025-03-31
Payer: COMMERCIAL

## 2025-03-31 NOTE — PROGRESS NOTES
ASSESSMENT/PLAN:    (M53.3) Pain of both sacroiliac joints  (primary encounter diagnosis)  Comment: TENS unit ordered; will refer back to Dr Saldivar to discuss whether there are other potential procedures for her SI pain; consider spine surgeon referral; will continue wean down off oxycodone; f/up in 4 wks  Plan: Pain Management  Referral, oxyCODONE (ROXICODONE) 5 MG tablet, Tens Unit/Supplies Order          (Z98.1) History of lumbar fusion  Comment: see above  Plan: Pain Management  Referral          Sahil Kimball MD  April 1, 2025  10:50 AM    Pt is a 74 year old female last seen on 3/4/25 via virtual visit here for follow up of:     Pain in both SI joints - overall feeling better  Had to use THC to supplement to deal w/ pain  R leg weakness due to pain- slept w/ legs on floor and back on bed!   Strength has improved  Has days when she takes 1 oxycodone, some days she feels like she needs 4 but does not take them      Per my last note:  (M53.3) Pain of both sacroiliac joints  Comment: stable; will continue wean; f/up in 4 wks  Plan: oxyCODONE (ROXICODONE) 5 MG tablet    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 28 tablet 0    acetaminophen (TYLENOL) 500 MG tablet Take 1,000 mg by mouth 3 times daily      artificial tears OINT ophthalmic  ointment Place into both eyes nightly as needed for dry eyes.      Ascorbic Acid (VITAMIN C) 500 MG CHEW Take 1 tablet by mouth every morning      atorvastatin (LIPITOR) 80 MG tablet Take 1 tablet (80 mg) by mouth daily. 90 tablet 3    carvedilol (COREG) 12.5 MG tablet Take 1 tablet (12.5 mg) by mouth 2 times daily (with meals). 180 tablet 3    CRANBERRY EXTRACT PO Take 500 mg by mouth every morning      diclofenac (VOLTAREN) 1 % topical gel Apply 2 g topically 4 times daily 100 g 6    gabapentin (NEURONTIN) 300 MG capsule Take 4 capsules (1,200 mg) by mouth 3 times daily. 1080 capsule 3    HEMP OIL OR EXTRACT OR OTHER CBD CANNABINOID, NOT MEDICAL CANNABIS, THC      hydrochlorothiazide (HYDRODIURIL) 25 MG tablet Take 1 tablet (25 mg) by mouth daily. 90 tablet 3    levothyroxine (SYNTHROID/LEVOTHROID) 112 MCG tablet TAKE 1/2 TAB BY MOUTH EVERY DAY 45 tablet 3    lisinopril (ZESTRIL) 20 MG tablet Take 1 tablet (20 mg) by mouth 2 times daily. 180 tablet 3    MAGNESIUM GLYCINATE PO Take 400 mg by mouth at bedtime.      Melatonin 10 MG TABS tablet Take 10 mg by mouth nightly as needed.      Multiple Vitamin (MULTI-VITAMIN) per tablet Take 1 tablet by mouth every morning      omeprazole (PRILOSEC) 20 MG DR capsule Take 1 capsule (20 mg) by mouth daily. 90 capsule 3    ondansetron (ZOFRAN) 4 MG tablet Take 1 tablet (4 mg) by mouth every 6 hours as needed for nausea. 12 tablet 3    ondansetron (ZOFRAN-ODT) 4 MG ODT tab Take 1 tablet (4 mg) by mouth every 12 hours as needed for nausea 180 tablet 3    oxyCODONE (ROXICODONE) 5 MG tablet Take 1 tablet (5 mg) by mouth 2 times daily as needed for severe pain. 45 tablet 0    spironolactone (ALDACTONE) 50 MG tablet Take 1 tablet (50 mg) by mouth daily at 2 pm. 90 tablet 3    tiZANidine (ZANAFLEX) 4 MG tablet Take 1 tablet (4 mg) by mouth at bedtime. (Patient taking differently: Take 4 mg by mouth nightly as needed for muscle spasms.) 90 tablet 3    Turmeric 500 MG CAPS Take 500 mg  by mouth every morning        Allergies   Allergen Reactions    Erythromycin Nausea    Penicillins Hives     Around age 6 - doesn't recall full reaction, mother told her it was hives.     Has tolerated cephalsporins.       ROS:   Gen- no fevers/chills   Rheum - no morning stiffness   Derm - no rash/ redness   Neuro - no numbness, no tingling   Remainder of ROS negative.     Exam:     DME (Durable Medical Equipment) Orders and Documentation  Orders Placed This Encounter   Procedures    Tens Unit/Supplies Order        The patient was assessed and it was determined the patient is in need of the following listed DME Supplies/Equipment. Please complete supporting documentation below to demonstrate medical necessity.

## 2025-04-01 ENCOUNTER — OFFICE VISIT (OUTPATIENT)
Dept: ORTHOPEDICS | Facility: CLINIC | Age: 75
End: 2025-04-01
Attending: FAMILY MEDICINE
Payer: COMMERCIAL

## 2025-04-01 DIAGNOSIS — Z98.1 HISTORY OF LUMBAR FUSION: ICD-10-CM

## 2025-04-01 DIAGNOSIS — M53.3 PAIN OF BOTH SACROILIAC JOINTS: Primary | ICD-10-CM

## 2025-04-01 PROCEDURE — 99213 OFFICE O/P EST LOW 20 MIN: CPT | Performed by: FAMILY MEDICINE

## 2025-04-01 RX ORDER — OXYCODONE HYDROCHLORIDE 5 MG/1
5 TABLET ORAL DAILY PRN
Qty: 28 TABLET | Refills: 0 | Status: SHIPPED | OUTPATIENT
Start: 2025-04-01 | End: 2025-04-29

## 2025-04-01 NOTE — LETTER
4/1/2025      RE: Nadira Perez  [address]  [place] [address]     Dear Colleague,    Thank you for referring your patient, Nadira Perez, to the [facility] [place]. Please see a copy of my visit note below.    ASSESSMENT/PLAN:    (M53.3) Pain of both sacroiliac joints  (primary encounter diagnosis)  Comment: TENS unit ordered; will refer back to Dr Saldivar to discuss whether there are other potential procedures for her SI pain; consider spine surgeon referral; will continue wean down off oxycodone; f/up in 4 wks  Plan: Pain Management  Referral, oxyCODONE (ROXICODONE) 5 MG tablet, Tens Unit/Supplies Order          (Z98.1) History of lumbar fusion  Comment: see above  Plan: Pain Management  Referral          Sahil Kimball MD  April 1, 2025  10:50 AM    Pt is a 74 year old female last seen on 3/4/25 via virtual visit here for follow up of:     Pain in both SI joints - overall feeling better  Had to use THC to supplement to deal w/ pain  R leg weakness due to pain- slept w/ legs on floor and back on bed!   Strength has improved  Has days when she takes 1 oxycodone, some days she feels like she needs 4 but does not take them      Per my last note:  (M53.3) Pain of both sacroiliac joints  Comment: stable; will continue wean; f/up in 4 wks  Plan: oxyCODONE (ROXICODONE) 5 MG tablet    Past Medical History:   Diagnosis Date     Arthritis      Bone disease      Breast cancer (H)      Diabetes (H)      H/O kyphoplasty      Hearing problem      History of blood transfusion      History of kidney stones      History of radiation therapy      Hyperlipemia      Hypertension      Hypopotassemia      Irregular heart beat      Kidney problem      Lymph edema      Medullary sponge kidney      Osteopenia      PONV (postoperative nausea and vomiting)      Reduced vision      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 28 tablet 0     acetaminophen (TYLENOL) 500 MG tablet Take 1,000 mg by mouth 3 times daily       artificial tears OINT ophthalmic ointment Place into both eyes nightly as needed for dry eyes.       Ascorbic Acid (VITAMIN C) 500 MG CHEW Take 1 tablet by mouth every morning       atorvastatin (LIPITOR) 80 MG tablet Take 1 tablet (80 mg) by mouth daily. 90 tablet 3     carvedilol (COREG) 12.5 MG tablet Take 1 tablet (12.5 mg) by mouth 2 times daily (with meals). 180 tablet 3     CRANBERRY EXTRACT PO Take 500 mg by mouth every morning       diclofenac (VOLTAREN) 1 % topical gel Apply 2 g topically 4 times daily 100 g 6     gabapentin (NEURONTIN) 300 MG capsule Take 4 capsules (1,200 mg) by mouth 3 times daily. 1080 capsule 3     HEMP OIL OR EXTRACT OR OTHER CBD CANNABINOID, NOT MEDICAL CANNABIS, THC       hydrochlorothiazide (HYDRODIURIL) 25 MG tablet Take 1 tablet (25 mg) by mouth daily. 90 tablet 3     levothyroxine (SYNTHROID/LEVOTHROID) 112 MCG tablet TAKE 1/2 TAB BY MOUTH EVERY DAY 45 tablet 3     lisinopril (ZESTRIL) 20 MG tablet Take 1 tablet (20 mg) by mouth 2 times daily. 180 tablet 3     MAGNESIUM GLYCINATE PO Take 400 mg by mouth at bedtime.       Melatonin 10 MG TABS tablet Take 10 mg by mouth nightly as needed.       Multiple Vitamin (MULTI-VITAMIN) per tablet Take 1 tablet by mouth every morning       omeprazole (PRILOSEC) 20 MG DR capsule Take 1 capsule (20 mg) by mouth daily. 90 capsule 3     ondansetron (ZOFRAN) 4 MG tablet Take 1 tablet (4 mg) by mouth every 6 hours as needed for nausea. 12 tablet 3     ondansetron (ZOFRAN-ODT) 4 MG ODT tab Take 1 tablet (4 mg) by mouth every 12 hours as needed for nausea 180 tablet 3     oxyCODONE (ROXICODONE) 5 MG tablet Take 1 tablet (5 mg) by mouth 2 times daily as needed for severe pain. 45 tablet 0      spironolactone (ALDACTONE) 50 MG tablet Take 1 tablet (50 mg) by mouth daily at 2 pm. 90 tablet 3     tiZANidine (ZANAFLEX) 4 MG tablet Take 1 tablet (4 mg) by mouth at bedtime. (Patient taking differently: Take 4 mg by mouth nightly as needed for muscle spasms.) 90 tablet 3     Turmeric 500 MG CAPS Take 500 mg by mouth every morning        Allergies   Allergen Reactions     Erythromycin Nausea     Penicillins Hives     Around age 6 - doesn't recall full reaction, mother told her it was hives.     Has tolerated cephalsporins.       ROS:   Gen- no fevers/chills   Rheum - no morning stiffness   Derm - no rash/ redness   Neuro - no numbness, no tingling   Remainder of ROS negative.     Exam:     DME (Durable Medical Equipment) Orders and Documentation  Orders Placed This Encounter   Procedures     Tens Unit/Supplies Order        The patient was assessed and it was determined the patient is in need of the following listed DME Supplies/Equipment. Please complete supporting documentation below to demonstrate medical necessity.             Again, thank you for allowing me to participate in the care of your patient.      Sincerely,    Sahil Kimball MD

## 2025-04-07 ENCOUNTER — MYC MEDICAL ADVICE (OUTPATIENT)
Dept: ORTHOPEDICS | Facility: CLINIC | Age: 75
End: 2025-04-07
Payer: COMMERCIAL

## 2025-04-11 ENCOUNTER — TELEPHONE (OUTPATIENT)
Dept: PHYSICAL MEDICINE AND REHAB | Facility: CLINIC | Age: 75
End: 2025-04-11
Payer: COMMERCIAL

## 2025-04-11 NOTE — TELEPHONE ENCOUNTER
[facility] Call Center    Phone Message    May a detailed message be left on voicemail: yes     Reason for Call: Other: Patient is calling requesting to discuss her next steps for pain management and states that the steroid injections are not working. Please call back to discuss further.      Action Taken: Message routed to:  [facility] ([facility]    Travel Screening: Not Applicable

## 2025-04-15 ENCOUNTER — OFFICE VISIT (OUTPATIENT)
Dept: ORTHOPEDICS | Facility: CLINIC | Age: 75
End: 2025-04-15
Payer: OTHER MISCELLANEOUS

## 2025-04-15 DIAGNOSIS — M19.032 ARTHRITIS OF BOTH WRISTS: Primary | ICD-10-CM

## 2025-04-15 DIAGNOSIS — M19.031 ARTHRITIS OF BOTH WRISTS: Primary | ICD-10-CM

## 2025-04-15 RX ORDER — TRIAMCINOLONE ACETONIDE 40 MG/ML
40 INJECTION, SUSPENSION INTRA-ARTICULAR; INTRAMUSCULAR
Status: COMPLETED | OUTPATIENT
Start: 2025-04-15 | End: 2025-04-15

## 2025-04-15 RX ORDER — LIDOCAINE HYDROCHLORIDE 10 MG/ML
1 INJECTION, SOLUTION INFILTRATION; PERINEURAL
Status: COMPLETED | OUTPATIENT
Start: 2025-04-15 | End: 2025-04-15

## 2025-04-15 RX ADMIN — TRIAMCINOLONE ACETONIDE 40 MG: 40 INJECTION, SUSPENSION INTRA-ARTICULAR; INTRAMUSCULAR at 15:18

## 2025-04-15 RX ADMIN — LIDOCAINE HYDROCHLORIDE 1 ML: 10 INJECTION, SOLUTION INFILTRATION; PERINEURAL at 15:18

## 2025-04-15 NOTE — PROGRESS NOTES
Orthopaedic Surgery Hand Clinic Progress Note    Patient Name: Nadira Perez  MRN: [id number]  : 1950  Date: Apr 15, 2025    Date of Surgery: 23    Surgery Performed: 1) Open right carpal tunnel release  2) Removal of buried implant (deep) right wrist    Subjective:     4/15/25: Patient was last seen 10/15/24. She returns today for repeat injections of bilateral wrists. Was getting injections in her back for a while and wasn't sure how much steroid was ok. Now on oxycodone for her back.    10/15/24:  Patient last sen on 24 at which point patient had bilateral radiocarpal injections.  As with previous injections, she received relief for a short duration but cannot recall how long.  Wrist pain has returned even with using the brace.  She did sustain a fall in fractured the fifth metacarpal neck.  This was treated nonoperatively.  She has been undergoing treatment for SI pain.    24: Patient was last seen 23. She received bilateral radiocarpal joint injections at her last visit and said the injections took 2-3 weeks to set in. They provided relief until pain returned 2 weeks ago. Her left is more bothersome than right. She is interested in repeating injections today and would like a topical prescribed as well. Patient had a recent fall and fractured her sternum.    23: Patient was last seen 23. She received left radiocarpal injection at her last visit that provided relief for about 2 months. She would like both wrists injected today. She states the cramping of her fingers has improved.     23: Patient was last seen 23. She had ultrasound completed 6/15/23 and is here to review results today. Since last visit she reports continued spasms and cramping of the fingers in her left hand. She continues to have cramping/locking of her fingers with certain postures like putting on earrings or her bra. Seems to most affected the ulnar sided digits. She does not have classic  "pain or paresthesias in the median nerve distribution. No nocturnal symptoms. She has been wearing a wrist brace, using Tylenol and gabapentin.  She is interested in repeating the left radiocarpal  injection today.    4/25/23: Patient was last seen 2/14/23 at which time I provided bilateral radiocarpal injections for wrist OA and left middle finger trigger injection. She is still having relief from all three injections. MF triggering seems to have resolved. Main concern today is cramping of the fingers. Ongoing 6 months, worse over the last 2. She states the thumb, index, middle, and ring fingers seem to lock up with certain hand postures like when putting on earrings or wearing her bra. She does not have classic pain or paresthesias in the median nerve distribution. No nocturnal symptoms. She has been wearing a wrist brace, using Tylenol and gabapentin.    Update 2/14/2023 Patient last seen on 8/9/22. At which time patient had cortisone injection for A1 pulley of left ring finger and bilateral radiocarpal injections. She states these are quite effective for a while after the injections. Patient was to continue wearing  She returns today for repeat radiocarpal injections. She has developed dorsal radial swelling over the carpus more pronounced on the right. She has developed locking/catching of the left long finger as well.    8/9/22: Patient was last seen 5/3/22 at which time I provided her with bilateral radiocarpal injections and new braces. She notes since last visit she sustained a fall around 5/4, and sustained nondisplaced fractures to left ring finger and right thumb for which she was treated by Dr. Miguel. She has healed fine from those, states thumb IP still a bit stiff. She has continued to have bilateral wrist pain and notes new onset of \"spasms\" in left hand with gripping/ grasping objections within the last 2-3 weeks. She states that her left small and ring fingers seem to lock and spasm with " /finger flexion. She has to forcibly extend it sometimes to make them straight with pain. She states this must have happened 6-7 times over the last 2 months.      Objective:  General: alert, appropriate, NAD  HEENT: NC/AT  CV: RRR by pulse  Pulm: Unlabored on RA  MSK:  BUE   Volar and dorsal incisions c/d/i, no e/o infection  Moderate pain to palpation dorsal central wrists bilaterally  ROM baseline  Intact EPL/FPL/APB/HI  Diminished sensation median nerve distribution baseline  Negative Tinel's at the left carpal tunnel, no crepitus  WWP CR< 2s    Imaging:  None new. XRs of left ring finger and right hand from 5/4/2022 and 5/31/2022 reviewed.  These demonstrate nondisplaced fractures of the right thumb distal phalanx and left ring distal phalanx that have healed.      Repeat XRs of bilateral wrists 2/14/23 demonstrates Unchanged alignment and appearance of 4 corner fusion on the left with ulnar subluxation of the carpus. Significant volar carpal osteophytes. 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     EMG/NCS 10/21/21  severe right median neuropathy at the wrist (carpal tunnel syndrome); no significant findings median or ulnar nerve on the left     Ultrasound 6/15/23  Impression:      1. No sonographic evidence of left carpal tunnel syndrome.     2. Tenosynovitis of the flexor carpi radialis.     3. Normal sonographic appearance of flexor tendon motion within the carpal tunnel.    CT of right hand 9/12/2024 demonstrates interval healing of the fifth metacarpal neck fracture.  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 Right s/p hardware removal for second failed 4 corner fusion, and carpal tunnel release.    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  We again discussed surgical options including a total wrist fusion versus  total wrist arthroplasty.  We discussed the details, risk, benefits of each.  Given that she has failed 2 other prior fusion surgeries, my main concern would be that she would not heal a total wrist fusion.  Given the sclerotic and previously operated carpal bones, removing them and performing a total wrist arthroplasty may actually be viable.  However advised that this would be more successful if she were more sedentary.    At this time, she is not ready to proceed with surgery.  She wishes to have bilateral radiocarpal injection today.    After obtaining written consent, I cleansed the skin over the dorsal wrists  with chlorhexidine.  I then anesthetized skin with ethyl chloride freeze spray after which I injected 1 cc of 1% lidocaine and 40 mg of Kenalog into the bilateral radiocarpal joint through the 3-4 portal.  The patient tolerated this well without complication.    Continue brace wear. Continue radiocarpal injections 3 times a year for symptomatic control until she wishes to proceed with surgery.    All questions answered, the patient voiced understanding and agreement    Rolan Conley MD    Hand, Upper Extremity & Microvascular Surgery  Department of Orthopedic Surgery  [facility]      Medium Joint Injection/Arthrocentesis: bilateral radiocarpal    Date/Time: 4/15/2025 3:18 PM    Performed by: Rolan Conley MD  Authorized by: Rolan Conley MD    Indications:  Pain  Needle Size:  25 G  Guidance: surface landmarks    Location:  Wrist  Laterality:  Bilateral  Site:  Bilateral radiocarpal  Medications (Right):  40 mg triamcinolone 40 MG/ML; 1 mL lidocaine 1 %  Medications (Left):  40 mg triamcinolone 40 MG/ML; 1 mL lidocaine 1 %  Outcome:  Tolerated well, no immediate complications  Procedure discussed: discussed risks, benefits, and alternatives    Consent Given by:  Patient  Timeout: timeout called immediately prior to procedure    Prep: patient was prepped and draped in usual  sterile fashion

## 2025-04-15 NOTE — LETTER
4/15/2025      Nadira Perez  [address]  [facility] [address]      Dear Colleague,    Thank you for referring your patient, Nadira Perez, to the [facility] [place]. Please see a copy of my visit note below.    Orthopaedic Surgery Hand Clinic Progress Note    Patient Name: Nadira Perez  MRN: [id number]  : 1950  Date: Apr 15, 2025    Date of Surgery: 21    Surgery Performed: 1) Open right carpal tunnel release  2) Removal of buried implant (deep) right wrist    Subjective:     4/15/25: Patient was last seen 10/15/24. She returns today for repeat injections of bilateral wrists. Was getting injections in her back for a while and wasn't sure how much steroid was ok. Now on oxycodone for her back.    10/15/24:  Patient last sen on 24 at which point patient had bilateral radiocarpal injections.  As with previous injections, she received relief for a short duration but cannot recall how long.  Wrist pain has returned even with using the brace.  She did sustain a fall in fractured the fifth metacarpal neck.  This was treated nonoperatively.  She has been undergoing treatment for SI pain.    24: Patient was last seen 23. She received bilateral radiocarpal joint injections at her last visit and said the injections took 2-3 weeks to set in. They provided relief until pain returned 2 weeks ago. Her left is more bothersome than right. She is interested in repeating injections today and would like a topical prescribed as well. Patient had a recent fall and fractured her sternum.    23: Patient was last seen 23. She received left radiocarpal injection at her last visit that provided relief for about 2 months. She would like both wrists injected today. She states the cramping of her fingers has improved.     23: Patient was last seen 23. She had ultrasound completed 6/15/23 and is here to review results today. Since last visit  she reports continued spasms and cramping of the fingers in her left hand. She continues to have cramping/locking of her fingers with certain postures like putting on earrings or her bra. Seems to most affected the ulnar sided digits. She does not have classic pain or paresthesias in the median nerve distribution. No nocturnal symptoms. She has been wearing a wrist brace, using Tylenol and gabapentin.  She is interested in repeating the left radiocarpal  injection today.    4/25/23: Patient was last seen 2/14/23 at which time I provided bilateral radiocarpal injections for wrist OA and left middle finger trigger injection. She is still having relief from all three injections. MF triggering seems to have resolved. Main concern today is cramping of the fingers. Ongoing 6 months, worse over the last 2. She states the thumb, index, middle, and ring fingers seem to lock up with certain hand postures like when putting on earrings or wearing her bra. She does not have classic pain or paresthesias in the median nerve distribution. No nocturnal symptoms. She has been wearing a wrist brace, using Tylenol and gabapentin.    Update 2/14/2023 Patient last seen on 8/9/22. At which time patient had cortisone injection for A1 pulley of left ring finger and bilateral radiocarpal injections. She states these are quite effective for a while after the injections. Patient was to continue wearing  She returns today for repeat radiocarpal injections. She has developed dorsal radial swelling over the carpus more pronounced on the right. She has developed locking/catching of the left long finger as well.    8/9/22: Patient was last seen 5/3/22 at which time I provided her with bilateral radiocarpal injections and new braces. She notes since last visit she sustained a fall around 5/4, and sustained nondisplaced fractures to left ring finger and right thumb for which she was treated by Dr. Miguel. She has healed fine from those, states thumb  "IP still a bit stiff. She has continued to have bilateral wrist pain and notes new onset of \"spasms\" in left hand with gripping/ grasping objections within the last 2-3 weeks. She states that her left small and ring fingers seem to lock and spasm with /finger flexion. She has to forcibly extend it sometimes to make them straight with pain. She states this must have happened 6-7 times over the last 2 months.      Objective:  General: alert, appropriate, NAD  HEENT: NC/AT  CV: RRR by pulse  Pulm: Unlabored on RA  MSK:  BUE   Volar and dorsal incisions c/d/i, no e/o infection  Moderate pain to palpation dorsal central wrists bilaterally  ROM baseline  Intact EPL/FPL/APB/HI  Diminished sensation median nerve distribution baseline  Negative Tinel's at the left carpal tunnel, no crepitus  WWP CR< 2s    Imaging:  None new. XRs of left ring finger and right hand from 5/4/2022 and 5/31/2022 reviewed.  These demonstrate nondisplaced fractures of the right thumb distal phalanx and left ring distal phalanx that have healed.      Repeat XRs of bilateral wrists 2/14/23 demonstrates Unchanged alignment and appearance of 4 corner fusion on the left with ulnar subluxation of the carpus. Significant volar carpal osteophytes. 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     EMG/NCS 10/21/21  severe right median neuropathy at the wrist (carpal tunnel syndrome); no significant findings median or ulnar nerve on the left     Ultrasound 6/15/23  Impression:      1. No sonographic evidence of left carpal tunnel syndrome.     2. Tenosynovitis of the flexor carpi radialis.     3. Normal sonographic appearance of flexor tendon motion within the carpal tunnel.    CT of right hand 9/12/2024 demonstrates interval healing of the fifth metacarpal neck fracture.  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 " Right s/p hardware removal for second failed 4 corner fusion, and carpal tunnel release.    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  We again discussed surgical options including a total wrist fusion versus total wrist arthroplasty.  We discussed the details, risk, benefits of each.  Given that she has failed 2 other prior fusion surgeries, my main concern would be that she would not heal a total wrist fusion.  Given the sclerotic and previously operated carpal bones, removing them and performing a total wrist arthroplasty may actually be viable.  However advised that this would be more successful if she were more sedentary.    At this time, she is not ready to proceed with surgery.  She wishes to have bilateral radiocarpal injection today.    After obtaining written consent, I cleansed the skin over the dorsal wrists  with chlorhexidine.  I then anesthetized skin with ethyl chloride freeze spray after which I injected 1 cc of 1% lidocaine and 40 mg of Kenalog into the bilateral radiocarpal joint through the 3-4 portal.  The patient tolerated this well without complication.    Continue brace wear. Continue radiocarpal injections 3 times a year for symptomatic control until she wishes to proceed with surgery.    All questions answered, the patient voiced understanding and agreement    Rolan Conley MD    Hand, Upper Extremity & Microvascular Surgery  Department of Orthopedic Surgery  [facility]      Medium Joint Injection/Arthrocentesis: bilateral radiocarpal    Date/Time: 4/15/2025 3:18 PM    Performed by: Rolan Conley MD  Authorized by: Rolan Conley MD    Indications:  Pain  Needle Size:  25 G  Guidance: surface landmarks    Location:  Wrist  Laterality:  Bilateral  Site:  Bilateral radiocarpal  Medications (Right):  40 mg triamcinolone 40 MG/ML; 1 mL lidocaine 1 %  Medications (Left):  40 mg triamcinolone 40 MG/ML; 1 mL lidocaine 1  %  Outcome:  Tolerated well, no immediate complications  Procedure discussed: discussed risks, benefits, and alternatives    Consent Given by:  Patient  Timeout: timeout called immediately prior to procedure    Prep: patient was prepped and draped in usual sterile fashion                    Again, thank you for allowing me to participate in the care of your patient.        Sincerely,        Rolan Conley MD    Electronically signed

## 2025-04-29 ENCOUNTER — TELEPHONE (OUTPATIENT)
Dept: ANESTHESIOLOGY | Facility: CLINIC | Age: 75
End: 2025-04-29

## 2025-04-29 NOTE — TELEPHONE ENCOUNTER
Left Voicemail (1st Attempt) and Sent Mychart (1st Attempt) for the patient to call back and reschedule the following:    Appointment type: Return pain  Provider(s): Renée Harris  Date: 4/29/25  Specialty phone number: [phone]    Additonal Notes: Provider called out. rob

## 2025-04-29 NOTE — TELEPHONE ENCOUNTER
Patient confirmed scheduled appointment:     Date: 5/6/25  Time: 10:30 AM  Visit type: Return Pain  Visit mode: In Person  Provider: Renée Harris  Location: [facility]    Additional Notes: Rescheduled from 4/29 (provider out). Sarath

## 2025-05-04 ENCOUNTER — HOSPITAL ENCOUNTER (INPATIENT)
Facility: CLINIC | Age: 75
End: 2025-05-04
Attending: FAMILY MEDICINE | Admitting: SURGERY
Payer: COMMERCIAL

## 2025-05-04 ENCOUNTER — HOSPITAL ENCOUNTER (INPATIENT)
Facility: CLINIC | Age: 75
Setting detail: SURGERY ADMIT
End: 2025-05-04
Attending: SURGERY | Admitting: SURGERY
Payer: COMMERCIAL

## 2025-05-04 ENCOUNTER — ANESTHESIA EVENT (OUTPATIENT)
Dept: SURGERY | Facility: CLINIC | Age: 75
End: 2025-05-04
Payer: COMMERCIAL

## 2025-05-04 ENCOUNTER — ANESTHESIA (OUTPATIENT)
Dept: SURGERY | Facility: CLINIC | Age: 75
End: 2025-05-04
Payer: COMMERCIAL

## 2025-05-04 ENCOUNTER — APPOINTMENT (OUTPATIENT)
Dept: CT IMAGING | Facility: CLINIC | Age: 75
DRG: 331 | End: 2025-05-04
Attending: STUDENT IN AN ORGANIZED HEALTH CARE EDUCATION/TRAINING PROGRAM
Payer: COMMERCIAL

## 2025-05-04 ENCOUNTER — APPOINTMENT (OUTPATIENT)
Dept: GENERAL RADIOLOGY | Facility: CLINIC | Age: 75
End: 2025-05-04
Attending: FAMILY MEDICINE
Payer: COMMERCIAL

## 2025-05-04 DIAGNOSIS — R11.11 VOMITING WITHOUT NAUSEA, UNSPECIFIED VOMITING TYPE: ICD-10-CM

## 2025-05-04 DIAGNOSIS — R10.31 ABDOMINAL PAIN, RIGHT LOWER QUADRANT: Primary | ICD-10-CM

## 2025-05-04 DIAGNOSIS — M53.3 PAIN OF BOTH SACROILIAC JOINTS: ICD-10-CM

## 2025-05-04 DIAGNOSIS — K43.6: ICD-10-CM

## 2025-05-04 DIAGNOSIS — M80.08XG PATHOLOGICAL FRACTURE OF VERTEBRA DUE TO AGE-RELATED OSTEOPOROSIS WITH DELAYED HEALING, SUBSEQUENT ENCOUNTER: ICD-10-CM

## 2025-05-04 DIAGNOSIS — R23.1 PALLOR: ICD-10-CM

## 2025-05-04 LAB
ABO + RH BLD: NORMAL
ALBUMIN SERPL BCG-MCNC: 3 G/DL (ref 3.5–5.2)
ALP SERPL-CCNC: 50 U/L (ref 40–150)
ALT SERPL W P-5'-P-CCNC: 14 U/L (ref 0–50)
ANION GAP SERPL CALCULATED.3IONS-SCNC: 11 MMOL/L (ref 7–15)
APTT PPP: 28 SECONDS (ref 22–38)
AST SERPL W P-5'-P-CCNC: 17 U/L (ref 0–45)
BASE EXCESS BLDV CALC-SCNC: 1.4 MMOL/L (ref -3–3)
BASOPHILS # BLD AUTO: 0 10E3/UL (ref 0–0.2)
BASOPHILS NFR BLD AUTO: 0 %
BILIRUB SERPL-MCNC: 0.4 MG/DL
BLD GP AB SCN SERPL QL: NEGATIVE
BUN SERPL-MCNC: 18.1 MG/DL (ref 8–23)
CA-I BLD-MCNC: 4.7 MG/DL (ref 4.4–5.2)
CALCIUM SERPL-MCNC: 6.3 MG/DL (ref 8.8–10.4)
CHLORIDE SERPL-SCNC: 114 MMOL/L (ref 98–107)
CREAT SERPL-MCNC: 0.6 MG/DL (ref 0.51–0.95)
CREAT SERPL-MCNC: 0.84 MG/DL (ref 0.51–0.95)
EGFRCR SERPLBLD CKD-EPI 2021: 73 ML/MIN/1.73M2
EGFRCR SERPLBLD CKD-EPI 2021: >90 ML/MIN/1.73M2
EOSINOPHIL # BLD AUTO: 0.1 10E3/UL (ref 0–0.7)
EOSINOPHIL NFR BLD AUTO: 1 %
ERYTHROCYTE [DISTWIDTH] IN BLOOD BY AUTOMATED COUNT: 15.3 % (ref 10–15)
GLUCOSE BLD-MCNC: 97 MG/DL (ref 70–99)
GLUCOSE BLDC GLUCOMTR-MCNC: 110 MG/DL (ref 70–99)
GLUCOSE BLDC GLUCOMTR-MCNC: 117 MG/DL (ref 70–99)
GLUCOSE SERPL-MCNC: 98 MG/DL (ref 70–99)
HCO3 BLDV-SCNC: 27 MMOL/L (ref 21–28)
HCO3 SERPL-SCNC: 18 MMOL/L (ref 22–29)
HCT VFR BLD AUTO: 28.9 % (ref 35–47)
HGB BLD-MCNC: 11.4 G/DL (ref 11.7–15.7)
HGB BLD-MCNC: 9.6 G/DL (ref 11.7–15.7)
IMM GRANULOCYTES # BLD: 0.1 10E3/UL
IMM GRANULOCYTES NFR BLD: 1 %
INR PPP: 1.09 (ref 0.85–1.15)
LACTATE BLD-SCNC: 1.5 MMOL/L (ref 0.7–2)
LACTATE SERPL-SCNC: 1.2 MMOL/L (ref 0.7–2)
LYMPHOCYTES # BLD AUTO: 0.7 10E3/UL (ref 0.8–5.3)
LYMPHOCYTES NFR BLD AUTO: 10 %
MAGNESIUM SERPL-MCNC: 1.4 MG/DL (ref 1.7–2.3)
MCH RBC QN AUTO: 29.7 PG (ref 26.5–33)
MCHC RBC AUTO-ENTMCNC: 33.2 G/DL (ref 31.5–36.5)
MCV RBC AUTO: 90 FL (ref 78–100)
MONOCYTES # BLD AUTO: 0.4 10E3/UL (ref 0–1.3)
MONOCYTES NFR BLD AUTO: 6 %
NEUTROPHILS # BLD AUTO: 5.5 10E3/UL (ref 1.6–8.3)
NEUTROPHILS NFR BLD AUTO: 82 %
NRBC # BLD AUTO: 0 10E3/UL
NRBC BLD AUTO-RTO: 0 /100
O2/TOTAL GAS SETTING VFR VENT: 90 %
OXYHGB MFR BLDV: 79 % (ref 70–75)
PCO2 BLDV: 44 MM HG (ref 40–50)
PH BLDV: 7.39 [PH] (ref 7.32–7.43)
PLATELET # BLD AUTO: 221 10E3/UL (ref 150–450)
PO2 BLDV: 47 MM HG (ref 25–47)
POTASSIUM BLD-SCNC: 3.1 MMOL/L (ref 3.4–5.3)
POTASSIUM SERPL-SCNC: 2.2 MMOL/L (ref 3.4–5.3)
PROT SERPL-MCNC: 4.3 G/DL (ref 6.4–8.3)
PROTHROMBIN TIME: 14.5 SECONDS (ref 11.8–14.8)
RBC # BLD AUTO: 3.23 10E6/UL (ref 3.8–5.2)
SAO2 % BLDV: 81 % (ref 70–75)
SODIUM BLD-SCNC: 144 MMOL/L (ref 135–145)
SODIUM SERPL-SCNC: 143 MMOL/L (ref 135–145)
SPECIMEN EXP DATE BLD: NORMAL
WBC # BLD AUTO: 6.8 10E3/UL (ref 4–11)

## 2025-05-04 PROCEDURE — 250N000011 HC RX IP 250 OP 636: Mod: JZ | Performed by: NURSE ANESTHETIST, CERTIFIED REGISTERED

## 2025-05-04 PROCEDURE — 99233 SBSQ HOSP IP/OBS HIGH 50: CPT | Mod: 57 | Performed by: SURGERY

## 2025-05-04 PROCEDURE — 96376 TX/PRO/DX INJ SAME DRUG ADON: CPT | Performed by: STUDENT IN AN ORGANIZED HEALTH CARE EDUCATION/TRAINING PROGRAM

## 2025-05-04 PROCEDURE — 86900 BLOOD TYPING SEROLOGIC ABO: CPT | Performed by: FAMILY MEDICINE

## 2025-05-04 PROCEDURE — 250N000013 HC RX MED GY IP 250 OP 250 PS 637

## 2025-05-04 PROCEDURE — 83735 ASSAY OF MAGNESIUM: CPT | Performed by: STUDENT IN AN ORGANIZED HEALTH CARE EDUCATION/TRAINING PROGRAM

## 2025-05-04 PROCEDURE — 999N000065 XR CHEST PORT 1 VIEW

## 2025-05-04 PROCEDURE — 82565 ASSAY OF CREATININE: CPT | Performed by: STUDENT IN AN ORGANIZED HEALTH CARE EDUCATION/TRAINING PROGRAM

## 2025-05-04 PROCEDURE — 74177 CT ABD & PELVIS W/CONTRAST: CPT | Mod: 26 | Performed by: RADIOLOGY

## 2025-05-04 PROCEDURE — 250N000009 HC RX 250: Performed by: NURSE ANESTHETIST, CERTIFIED REGISTERED

## 2025-05-04 PROCEDURE — 250N000011 HC RX IP 250 OP 636: Performed by: FAMILY MEDICINE

## 2025-05-04 PROCEDURE — 99291 CRITICAL CARE FIRST HOUR: CPT | Mod: 25 | Performed by: STUDENT IN AN ORGANIZED HEALTH CARE EDUCATION/TRAINING PROGRAM

## 2025-05-04 PROCEDURE — 83605 ASSAY OF LACTIC ACID: CPT | Performed by: FAMILY MEDICINE

## 2025-05-04 PROCEDURE — 250N000025 HC SEVOFLURANE, PER MIN: Performed by: SURGERY

## 2025-05-04 PROCEDURE — 360N000076 HC SURGERY LEVEL 3, PER MIN: Performed by: SURGERY

## 2025-05-04 PROCEDURE — 88307 TISSUE EXAM BY PATHOLOGIST: CPT | Mod: TC | Performed by: SURGERY

## 2025-05-04 PROCEDURE — 99285 EMERGENCY DEPT VISIT HI MDM: CPT | Mod: 25 | Performed by: FAMILY MEDICINE

## 2025-05-04 PROCEDURE — 85730 THROMBOPLASTIN TIME PARTIAL: CPT | Performed by: FAMILY MEDICINE

## 2025-05-04 PROCEDURE — 85004 AUTOMATED DIFF WBC COUNT: CPT | Performed by: FAMILY MEDICINE

## 2025-05-04 PROCEDURE — 250N000011 HC RX IP 250 OP 636: Mod: JZ | Performed by: STUDENT IN AN ORGANIZED HEALTH CARE EDUCATION/TRAINING PROGRAM

## 2025-05-04 PROCEDURE — 44120 REMOVAL OF SMALL INTESTINE: CPT | Mod: GC | Performed by: SURGERY

## 2025-05-04 PROCEDURE — 258N000003 HC RX IP 258 OP 636: Performed by: NURSE ANESTHETIST, CERTIFIED REGISTERED

## 2025-05-04 PROCEDURE — 250N000013 HC RX MED GY IP 250 OP 250 PS 637: Performed by: STUDENT IN AN ORGANIZED HEALTH CARE EDUCATION/TRAINING PROGRAM

## 2025-05-04 PROCEDURE — 96376 TX/PRO/DX INJ SAME DRUG ADON: CPT | Performed by: FAMILY MEDICINE

## 2025-05-04 PROCEDURE — 710N000010 HC RECOVERY PHASE 1, LEVEL 2, PER MIN: Performed by: SURGERY

## 2025-05-04 PROCEDURE — 96365 THER/PROPH/DIAG IV INF INIT: CPT | Performed by: STUDENT IN AN ORGANIZED HEALTH CARE EDUCATION/TRAINING PROGRAM

## 2025-05-04 PROCEDURE — 0WQF0ZZ REPAIR ABDOMINAL WALL, OPEN APPROACH: ICD-10-PCS | Performed by: SURGERY

## 2025-05-04 PROCEDURE — 96375 TX/PRO/DX INJ NEW DRUG ADDON: CPT | Performed by: FAMILY MEDICINE

## 2025-05-04 PROCEDURE — 360N000077 HC SURGERY LEVEL 4, PER MIN: Performed by: SURGERY

## 2025-05-04 PROCEDURE — 85610 PROTHROMBIN TIME: CPT | Performed by: FAMILY MEDICINE

## 2025-05-04 PROCEDURE — 999N000141 HC STATISTIC PRE-PROCEDURE NURSING ASSESSMENT: Performed by: SURGERY

## 2025-05-04 PROCEDURE — 0DB80ZZ EXCISION OF SMALL INTESTINE, OPEN APPROACH: ICD-10-PCS | Performed by: SURGERY

## 2025-05-04 PROCEDURE — 370N000017 HC ANESTHESIA TECHNICAL FEE, PER MIN: Performed by: SURGERY

## 2025-05-04 PROCEDURE — 258N000003 HC RX IP 258 OP 636: Performed by: STUDENT IN AN ORGANIZED HEALTH CARE EDUCATION/TRAINING PROGRAM

## 2025-05-04 PROCEDURE — 88307 TISSUE EXAM BY PATHOLOGIST: CPT | Mod: 26 | Performed by: PATHOLOGY

## 2025-05-04 PROCEDURE — 99285 EMERGENCY DEPT VISIT HI MDM: CPT | Performed by: FAMILY MEDICINE

## 2025-05-04 PROCEDURE — 250N000011 HC RX IP 250 OP 636: Performed by: NURSE ANESTHETIST, CERTIFIED REGISTERED

## 2025-05-04 PROCEDURE — 82565 ASSAY OF CREATININE: CPT | Performed by: FAMILY MEDICINE

## 2025-05-04 PROCEDURE — 84132 ASSAY OF SERUM POTASSIUM: CPT

## 2025-05-04 PROCEDURE — 272N000001 HC OR GENERAL SUPPLY STERILE: Performed by: SURGERY

## 2025-05-04 PROCEDURE — 36415 COLL VENOUS BLD VENIPUNCTURE: CPT | Performed by: FAMILY MEDICINE

## 2025-05-04 PROCEDURE — 96361 HYDRATE IV INFUSION ADD-ON: CPT | Performed by: FAMILY MEDICINE

## 2025-05-04 PROCEDURE — 120N000002 HC R&B MED SURG/OB UMMC

## 2025-05-04 PROCEDURE — 258N000003 HC RX IP 258 OP 636: Performed by: FAMILY MEDICINE

## 2025-05-04 PROCEDURE — 250N000011 HC RX IP 250 OP 636: Mod: JZ

## 2025-05-04 PROCEDURE — 0WJG4ZZ INSPECTION OF PERITONEAL CAVITY, PERCUTANEOUS ENDOSCOPIC APPROACH: ICD-10-PCS | Performed by: SURGERY

## 2025-05-04 PROCEDURE — 250N000011 HC RX IP 250 OP 636: Performed by: STUDENT IN AN ORGANIZED HEALTH CARE EDUCATION/TRAINING PROGRAM

## 2025-05-04 PROCEDURE — 74177 CT ABD & PELVIS W/CONTRAST: CPT

## 2025-05-04 PROCEDURE — 36415 COLL VENOUS BLD VENIPUNCTURE: CPT | Performed by: STUDENT IN AN ORGANIZED HEALTH CARE EDUCATION/TRAINING PROGRAM

## 2025-05-04 RX ORDER — ONDANSETRON 2 MG/ML
4 INJECTION INTRAMUSCULAR; INTRAVENOUS EVERY 30 MIN PRN
OUTPATIENT
Start: 2025-05-04

## 2025-05-04 RX ORDER — NALOXONE HYDROCHLORIDE 0.4 MG/ML
0.1 INJECTION, SOLUTION INTRAMUSCULAR; INTRAVENOUS; SUBCUTANEOUS
Status: DISCONTINUED | OUTPATIENT
Start: 2025-05-04 | End: 2025-05-04 | Stop reason: HOSPADM

## 2025-05-04 RX ORDER — ONDANSETRON 2 MG/ML
INJECTION INTRAMUSCULAR; INTRAVENOUS PRN
Status: DISCONTINUED | OUTPATIENT
Start: 2025-05-04 | End: 2025-05-04

## 2025-05-04 RX ORDER — ONDANSETRON 2 MG/ML
4 INJECTION INTRAMUSCULAR; INTRAVENOUS EVERY 30 MIN PRN
Status: DISCONTINUED | OUTPATIENT
Start: 2025-05-04 | End: 2025-05-04 | Stop reason: HOSPADM

## 2025-05-04 RX ORDER — OXYCODONE HYDROCHLORIDE 10 MG/1
10 TABLET ORAL EVERY 4 HOURS PRN
Status: DISPENSED | OUTPATIENT
Start: 2025-05-04

## 2025-05-04 RX ORDER — HYDROMORPHONE HCL IN WATER/PF 6 MG/30 ML
0.2 PATIENT CONTROLLED ANALGESIA SYRINGE INTRAVENOUS EVERY 5 MIN PRN
Status: DISCONTINUED | OUTPATIENT
Start: 2025-05-04 | End: 2025-05-04 | Stop reason: HOSPADM

## 2025-05-04 RX ORDER — FENTANYL CITRATE 50 UG/ML
25 INJECTION, SOLUTION INTRAMUSCULAR; INTRAVENOUS EVERY 5 MIN PRN
Status: DISCONTINUED | OUTPATIENT
Start: 2025-05-04 | End: 2025-05-04 | Stop reason: HOSPADM

## 2025-05-04 RX ORDER — METHOCARBAMOL 500 MG/1
500 TABLET, FILM COATED ORAL EVERY 6 HOURS
Status: DISCONTINUED | OUTPATIENT
Start: 2025-05-04 | End: 2025-05-05

## 2025-05-04 RX ORDER — ONDANSETRON 4 MG/1
4 TABLET, ORALLY DISINTEGRATING ORAL EVERY 30 MIN PRN
Status: DISCONTINUED | OUTPATIENT
Start: 2025-05-04 | End: 2025-05-04 | Stop reason: HOSPADM

## 2025-05-04 RX ORDER — SODIUM CHLORIDE, SODIUM LACTATE, POTASSIUM CHLORIDE, CALCIUM CHLORIDE 600; 310; 30; 20 MG/100ML; MG/100ML; MG/100ML; MG/100ML
INJECTION, SOLUTION INTRAVENOUS CONTINUOUS
Status: ACTIVE | OUTPATIENT
Start: 2025-05-04

## 2025-05-04 RX ORDER — HYDROMORPHONE HYDROCHLORIDE 1 MG/ML
0.5 INJECTION, SOLUTION INTRAMUSCULAR; INTRAVENOUS; SUBCUTANEOUS
Status: COMPLETED | OUTPATIENT
Start: 2025-05-04 | End: 2025-05-04

## 2025-05-04 RX ORDER — FENTANYL CITRATE 50 UG/ML
50 INJECTION, SOLUTION INTRAMUSCULAR; INTRAVENOUS EVERY 5 MIN PRN
OUTPATIENT
Start: 2025-05-04

## 2025-05-04 RX ORDER — HYDRALAZINE HYDROCHLORIDE 20 MG/ML
INJECTION INTRAMUSCULAR; INTRAVENOUS PRN
Status: DISCONTINUED | OUTPATIENT
Start: 2025-05-04 | End: 2025-05-04

## 2025-05-04 RX ORDER — CEFAZOLIN SODIUM 2 G/50ML
2 SOLUTION INTRAVENOUS
Status: DISCONTINUED | OUTPATIENT
Start: 2025-05-04 | End: 2025-05-04 | Stop reason: HOSPADM

## 2025-05-04 RX ORDER — MORPHINE SULFATE 4 MG/ML
2 INJECTION, SOLUTION INTRAMUSCULAR; INTRAVENOUS ONCE
Status: COMPLETED | OUTPATIENT
Start: 2025-05-04 | End: 2025-05-04

## 2025-05-04 RX ORDER — NALOXONE HYDROCHLORIDE 0.4 MG/ML
0.2 INJECTION, SOLUTION INTRAMUSCULAR; INTRAVENOUS; SUBCUTANEOUS
Status: ACTIVE | OUTPATIENT
Start: 2025-05-04

## 2025-05-04 RX ORDER — LIDOCAINE 40 MG/G
CREAM TOPICAL
Status: DISCONTINUED | OUTPATIENT
Start: 2025-05-04 | End: 2025-05-04 | Stop reason: HOSPADM

## 2025-05-04 RX ORDER — CEFAZOLIN SODIUM/WATER 2 G/20 ML
SYRINGE (ML) INTRAVENOUS PRN
Status: DISCONTINUED | OUTPATIENT
Start: 2025-05-04 | End: 2025-05-04

## 2025-05-04 RX ORDER — FENTANYL CITRATE 50 UG/ML
25 INJECTION, SOLUTION INTRAMUSCULAR; INTRAVENOUS EVERY 5 MIN PRN
OUTPATIENT
Start: 2025-05-04

## 2025-05-04 RX ORDER — MAGNESIUM SULFATE HEPTAHYDRATE 40 MG/ML
2 INJECTION, SOLUTION INTRAVENOUS ONCE
Status: COMPLETED | OUTPATIENT
Start: 2025-05-04 | End: 2025-05-04

## 2025-05-04 RX ORDER — ONDANSETRON 4 MG/1
4 TABLET, ORALLY DISINTEGRATING ORAL EVERY 30 MIN PRN
OUTPATIENT
Start: 2025-05-04

## 2025-05-04 RX ORDER — HALOPERIDOL 5 MG/ML
1 INJECTION INTRAMUSCULAR
OUTPATIENT
Start: 2025-05-04

## 2025-05-04 RX ORDER — ACETAMINOPHEN 325 MG/1
975 TABLET ORAL ONCE
Status: DISCONTINUED | OUTPATIENT
Start: 2025-05-04 | End: 2025-05-04 | Stop reason: HOSPADM

## 2025-05-04 RX ORDER — KETOROLAC TROMETHAMINE 30 MG/ML
INJECTION, SOLUTION INTRAMUSCULAR; INTRAVENOUS PRN
Status: DISCONTINUED | OUTPATIENT
Start: 2025-05-04 | End: 2025-05-04

## 2025-05-04 RX ORDER — PROCHLORPERAZINE MALEATE 5 MG/1
5 TABLET ORAL EVERY 6 HOURS PRN
Status: ACTIVE | OUTPATIENT
Start: 2025-05-04

## 2025-05-04 RX ORDER — DEXAMETHASONE SODIUM PHOSPHATE 4 MG/ML
4 INJECTION, SOLUTION INTRA-ARTICULAR; INTRALESIONAL; INTRAMUSCULAR; INTRAVENOUS; SOFT TISSUE
OUTPATIENT
Start: 2025-05-04

## 2025-05-04 RX ORDER — PROPOFOL 10 MG/ML
INJECTION, EMULSION INTRAVENOUS PRN
Status: DISCONTINUED | OUTPATIENT
Start: 2025-05-04 | End: 2025-05-04

## 2025-05-04 RX ORDER — DIMENHYDRINATE 50 MG/ML
25 INJECTION, SOLUTION INTRAMUSCULAR; INTRAVENOUS
OUTPATIENT
Start: 2025-05-04

## 2025-05-04 RX ORDER — HYDROMORPHONE HYDROCHLORIDE 1 MG/ML
0.2 INJECTION, SOLUTION INTRAMUSCULAR; INTRAVENOUS; SUBCUTANEOUS EVERY 5 MIN PRN
OUTPATIENT
Start: 2025-05-04

## 2025-05-04 RX ORDER — ONDANSETRON 2 MG/ML
4 INJECTION INTRAMUSCULAR; INTRAVENOUS ONCE
Status: COMPLETED | OUTPATIENT
Start: 2025-05-04 | End: 2025-05-04

## 2025-05-04 RX ORDER — CEFAZOLIN SODIUM 2 G/50ML
2 SOLUTION INTRAVENOUS SEE ADMIN INSTRUCTIONS
Status: DISCONTINUED | OUTPATIENT
Start: 2025-05-04 | End: 2025-05-04

## 2025-05-04 RX ORDER — OXYCODONE HYDROCHLORIDE 5 MG/1
5 TABLET ORAL
OUTPATIENT
Start: 2025-05-04

## 2025-05-04 RX ORDER — LIDOCAINE 40 MG/G
CREAM TOPICAL
Status: ACTIVE | OUTPATIENT
Start: 2025-05-04

## 2025-05-04 RX ORDER — HYDROMORPHONE HCL IN WATER/PF 6 MG/30 ML
0.2 PATIENT CONTROLLED ANALGESIA SYRINGE INTRAVENOUS
Status: DISPENSED | OUTPATIENT
Start: 2025-05-04

## 2025-05-04 RX ORDER — DEXAMETHASONE SODIUM PHOSPHATE 10 MG/ML
4 INJECTION, SOLUTION INTRAMUSCULAR; INTRAVENOUS
Status: DISCONTINUED | OUTPATIENT
Start: 2025-05-04 | End: 2025-05-04 | Stop reason: HOSPADM

## 2025-05-04 RX ORDER — SODIUM CHLORIDE, SODIUM LACTATE, POTASSIUM CHLORIDE, CALCIUM CHLORIDE 600; 310; 30; 20 MG/100ML; MG/100ML; MG/100ML; MG/100ML
INJECTION, SOLUTION INTRAVENOUS CONTINUOUS
Status: DISCONTINUED | OUTPATIENT
Start: 2025-05-04 | End: 2025-05-04 | Stop reason: HOSPADM

## 2025-05-04 RX ORDER — ESMOLOL HYDROCHLORIDE 10 MG/ML
INJECTION INTRAVENOUS PRN
Status: DISCONTINUED | OUTPATIENT
Start: 2025-05-04 | End: 2025-05-04

## 2025-05-04 RX ORDER — DEXAMETHASONE SODIUM PHOSPHATE 4 MG/ML
INJECTION, SOLUTION INTRA-ARTICULAR; INTRALESIONAL; INTRAMUSCULAR; INTRAVENOUS; SOFT TISSUE PRN
Status: DISCONTINUED | OUTPATIENT
Start: 2025-05-04 | End: 2025-05-04

## 2025-05-04 RX ORDER — ENOXAPARIN SODIUM 100 MG/ML
40 INJECTION SUBCUTANEOUS
Status: DISCONTINUED | OUTPATIENT
Start: 2025-05-04 | End: 2025-05-04 | Stop reason: HOSPADM

## 2025-05-04 RX ORDER — SODIUM CHLORIDE, SODIUM LACTATE, POTASSIUM CHLORIDE, CALCIUM CHLORIDE 600; 310; 30; 20 MG/100ML; MG/100ML; MG/100ML; MG/100ML
INJECTION, SOLUTION INTRAVENOUS CONTINUOUS PRN
Status: DISCONTINUED | OUTPATIENT
Start: 2025-05-04 | End: 2025-05-04

## 2025-05-04 RX ORDER — POTASSIUM CHLORIDE 7.45 MG/ML
10 INJECTION INTRAVENOUS ONCE
Status: DISCONTINUED | OUTPATIENT
Start: 2025-05-04 | End: 2025-05-04

## 2025-05-04 RX ORDER — IOPAMIDOL 755 MG/ML
105 INJECTION, SOLUTION INTRAVASCULAR ONCE
Status: COMPLETED | OUTPATIENT
Start: 2025-05-04 | End: 2025-05-04

## 2025-05-04 RX ORDER — NALOXONE HYDROCHLORIDE 0.4 MG/ML
0.4 INJECTION, SOLUTION INTRAMUSCULAR; INTRAVENOUS; SUBCUTANEOUS
Status: ACTIVE | OUTPATIENT
Start: 2025-05-04

## 2025-05-04 RX ORDER — LIDOCAINE HYDROCHLORIDE 20 MG/ML
INJECTION, SOLUTION INFILTRATION; PERINEURAL PRN
Status: DISCONTINUED | OUTPATIENT
Start: 2025-05-04 | End: 2025-05-04

## 2025-05-04 RX ORDER — FENTANYL CITRATE 50 UG/ML
50 INJECTION, SOLUTION INTRAMUSCULAR; INTRAVENOUS EVERY 5 MIN PRN
Status: DISCONTINUED | OUTPATIENT
Start: 2025-05-04 | End: 2025-05-04 | Stop reason: HOSPADM

## 2025-05-04 RX ORDER — ACETAMINOPHEN 325 MG/1
975 TABLET ORAL EVERY 8 HOURS
Status: DISCONTINUED | OUTPATIENT
Start: 2025-05-05 | End: 2025-05-05

## 2025-05-04 RX ORDER — OXYCODONE HYDROCHLORIDE 10 MG/1
10 TABLET ORAL
OUTPATIENT
Start: 2025-05-04

## 2025-05-04 RX ORDER — MORPHINE SULFATE 4 MG/ML
4 INJECTION, SOLUTION INTRAMUSCULAR; INTRAVENOUS ONCE
Status: COMPLETED | OUTPATIENT
Start: 2025-05-04 | End: 2025-05-04

## 2025-05-04 RX ORDER — ENOXAPARIN SODIUM 100 MG/ML
40 INJECTION SUBCUTANEOUS EVERY 24 HOURS
Status: DISPENSED | OUTPATIENT
Start: 2025-05-05

## 2025-05-04 RX ORDER — HYDROMORPHONE HCL IN WATER/PF 6 MG/30 ML
0.4 PATIENT CONTROLLED ANALGESIA SYRINGE INTRAVENOUS EVERY 5 MIN PRN
Status: DISCONTINUED | OUTPATIENT
Start: 2025-05-04 | End: 2025-05-04 | Stop reason: HOSPADM

## 2025-05-04 RX ORDER — ONDANSETRON 4 MG/1
4 TABLET, ORALLY DISINTEGRATING ORAL EVERY 6 HOURS PRN
Status: DISPENSED | OUTPATIENT
Start: 2025-05-04

## 2025-05-04 RX ORDER — POTASSIUM CHLORIDE 7.45 MG/ML
10 INJECTION INTRAVENOUS ONCE
Status: COMPLETED | OUTPATIENT
Start: 2025-05-04 | End: 2025-05-04

## 2025-05-04 RX ORDER — NALOXONE HYDROCHLORIDE 0.4 MG/ML
0.1 INJECTION, SOLUTION INTRAMUSCULAR; INTRAVENOUS; SUBCUTANEOUS
OUTPATIENT
Start: 2025-05-04

## 2025-05-04 RX ORDER — HYDROMORPHONE HYDROCHLORIDE 1 MG/ML
0.4 INJECTION, SOLUTION INTRAMUSCULAR; INTRAVENOUS; SUBCUTANEOUS EVERY 5 MIN PRN
OUTPATIENT
Start: 2025-05-04

## 2025-05-04 RX ORDER — OXYCODONE HYDROCHLORIDE 5 MG/1
5 TABLET ORAL EVERY 4 HOURS PRN
Status: ACTIVE | OUTPATIENT
Start: 2025-05-04

## 2025-05-04 RX ORDER — FENTANYL CITRATE 50 UG/ML
INJECTION, SOLUTION INTRAMUSCULAR; INTRAVENOUS PRN
Status: DISCONTINUED | OUTPATIENT
Start: 2025-05-04 | End: 2025-05-04

## 2025-05-04 RX ORDER — ACETAMINOPHEN 325 MG/1
975 TABLET ORAL ONCE
OUTPATIENT
Start: 2025-05-04 | End: 2025-05-04

## 2025-05-04 RX ORDER — HYDROMORPHONE HCL IN WATER/PF 6 MG/30 ML
0.4 PATIENT CONTROLLED ANALGESIA SYRINGE INTRAVENOUS
Status: DISPENSED | OUTPATIENT
Start: 2025-05-04

## 2025-05-04 RX ORDER — BUPIVACAINE HYDROCHLORIDE 5 MG/ML
INJECTION, SOLUTION EPIDURAL; INTRACAUDAL; PERINEURAL
Status: COMPLETED | OUTPATIENT
Start: 2025-05-04 | End: 2025-05-04

## 2025-05-04 RX ORDER — HYDROMORPHONE HYDROCHLORIDE 1 MG/ML
0.5 INJECTION, SOLUTION INTRAMUSCULAR; INTRAVENOUS; SUBCUTANEOUS EVERY 30 MIN PRN
Status: DISCONTINUED | OUTPATIENT
Start: 2025-05-04 | End: 2025-05-04

## 2025-05-04 RX ORDER — LABETALOL HYDROCHLORIDE 5 MG/ML
10 INJECTION, SOLUTION INTRAVENOUS
Status: DISCONTINUED | OUTPATIENT
Start: 2025-05-04 | End: 2025-05-04 | Stop reason: HOSPADM

## 2025-05-04 RX ORDER — POTASSIUM CHLORIDE 7.45 MG/ML
10 INJECTION INTRAVENOUS
Status: COMPLETED | OUTPATIENT
Start: 2025-05-04 | End: 2025-05-04

## 2025-05-04 RX ORDER — HYDROXYZINE HYDROCHLORIDE 10 MG/1
10 TABLET, FILM COATED ORAL EVERY 6 HOURS PRN
Status: DISCONTINUED | OUTPATIENT
Start: 2025-05-04 | End: 2025-05-04 | Stop reason: HOSPADM

## 2025-05-04 RX ORDER — ACETAMINOPHEN 325 MG/1
975 TABLET ORAL ONCE
Status: COMPLETED | OUTPATIENT
Start: 2025-05-04 | End: 2025-05-04

## 2025-05-04 RX ORDER — ONDANSETRON 2 MG/ML
4 INJECTION INTRAMUSCULAR; INTRAVENOUS EVERY 6 HOURS PRN
Status: DISPENSED | OUTPATIENT
Start: 2025-05-04

## 2025-05-04 RX ORDER — SODIUM CHLORIDE, SODIUM LACTATE, POTASSIUM CHLORIDE, CALCIUM CHLORIDE 600; 310; 30; 20 MG/100ML; MG/100ML; MG/100ML; MG/100ML
INJECTION, SOLUTION INTRAVENOUS CONTINUOUS
OUTPATIENT
Start: 2025-05-04

## 2025-05-04 RX ADMIN — POTASSIUM CHLORIDE 10 MEQ: 7.46 INJECTION, SOLUTION INTRAVENOUS at 17:33

## 2025-05-04 RX ADMIN — ONDANSETRON 4 MG: 2 INJECTION INTRAMUSCULAR; INTRAVENOUS at 18:06

## 2025-05-04 RX ADMIN — ACETAMINOPHEN 975 MG: 325 TABLET ORAL at 20:39

## 2025-05-04 RX ADMIN — DEXAMETHASONE SODIUM PHOSPHATE 6 MG: 4 INJECTION, SOLUTION INTRAMUSCULAR; INTRAVENOUS at 17:32

## 2025-05-04 RX ADMIN — MORPHINE SULFATE 2 MG: 4 INJECTION, SOLUTION INTRAMUSCULAR; INTRAVENOUS at 12:41

## 2025-05-04 RX ADMIN — SODIUM CHLORIDE, SODIUM LACTATE, POTASSIUM CHLORIDE, AND CALCIUM CHLORIDE: .6; .31; .03; .02 INJECTION, SOLUTION INTRAVENOUS at 17:50

## 2025-05-04 RX ADMIN — PROPOFOL 100 MG: 10 INJECTION, EMULSION INTRAVENOUS at 16:03

## 2025-05-04 RX ADMIN — IOPAMIDOL 105 ML: 755 INJECTION, SOLUTION INTRAVENOUS at 14:07

## 2025-05-04 RX ADMIN — ONDANSETRON 4 MG: 2 INJECTION INTRAMUSCULAR; INTRAVENOUS at 11:50

## 2025-05-04 RX ADMIN — FENTANYL CITRATE 50 MCG: 50 INJECTION INTRAMUSCULAR; INTRAVENOUS at 18:10

## 2025-05-04 RX ADMIN — FENTANYL CITRATE 50 MCG: 50 INJECTION, SOLUTION INTRAMUSCULAR; INTRAVENOUS at 20:07

## 2025-05-04 RX ADMIN — FENTANYL CITRATE 50 MCG: 50 INJECTION INTRAMUSCULAR; INTRAVENOUS at 19:06

## 2025-05-04 RX ADMIN — POTASSIUM CHLORIDE 10 MEQ: 7.46 INJECTION, SOLUTION INTRAVENOUS at 14:44

## 2025-05-04 RX ADMIN — HYDROMORPHONE HYDROCHLORIDE 1 MG: 1 INJECTION, SOLUTION INTRAMUSCULAR; INTRAVENOUS; SUBCUTANEOUS at 14:35

## 2025-05-04 RX ADMIN — HYDROMORPHONE HYDROCHLORIDE 0.5 MG: 1 INJECTION, SOLUTION INTRAMUSCULAR; INTRAVENOUS; SUBCUTANEOUS at 13:03

## 2025-05-04 RX ADMIN — FENTANYL CITRATE 50 MCG: 50 INJECTION, SOLUTION INTRAMUSCULAR; INTRAVENOUS at 19:54

## 2025-05-04 RX ADMIN — Medication 200 MG: at 19:03

## 2025-05-04 RX ADMIN — FENTANYL CITRATE 50 MCG: 50 INJECTION INTRAMUSCULAR; INTRAVENOUS at 17:29

## 2025-05-04 RX ADMIN — Medication 2 G: at 16:15

## 2025-05-04 RX ADMIN — SODIUM CHLORIDE, SODIUM LACTATE, POTASSIUM CHLORIDE, AND CALCIUM CHLORIDE: .6; .31; .03; .02 INJECTION, SOLUTION INTRAVENOUS at 15:50

## 2025-05-04 RX ADMIN — LIDOCAINE HYDROCHLORIDE 100 MG: 20 INJECTION, SOLUTION INFILTRATION; PERINEURAL at 16:03

## 2025-05-04 RX ADMIN — DEXMEDETOMIDINE HYDROCHLORIDE 16 MCG: 100 INJECTION, SOLUTION INTRAVENOUS at 18:00

## 2025-05-04 RX ADMIN — DEXMEDETOMIDINE HYDROCHLORIDE 8 MCG: 100 INJECTION, SOLUTION INTRAVENOUS at 17:05

## 2025-05-04 RX ADMIN — ONDANSETRON 4 MG: 2 INJECTION, SOLUTION INTRAMUSCULAR; INTRAVENOUS at 19:32

## 2025-05-04 RX ADMIN — ESMOLOL HYDROCHLORIDE 20 MG: 10 INJECTION, SOLUTION INTRAVENOUS at 17:10

## 2025-05-04 RX ADMIN — ESMOLOL HYDROCHLORIDE 30 MG: 10 INJECTION, SOLUTION INTRAVENOUS at 16:41

## 2025-05-04 RX ADMIN — POTASSIUM CHLORIDE 10 MEQ: 7.46 INJECTION, SOLUTION INTRAVENOUS at 16:40

## 2025-05-04 RX ADMIN — HYDROMORPHONE HYDROCHLORIDE 0.2 MG: 0.2 INJECTION, SOLUTION INTRAMUSCULAR; INTRAVENOUS; SUBCUTANEOUS at 23:29

## 2025-05-04 RX ADMIN — BUPIVACAINE HYDROCHLORIDE 30 ML: 5 INJECTION, SOLUTION EPIDURAL; INTRACAUDAL at 18:48

## 2025-05-04 RX ADMIN — FENTANYL CITRATE 50 MCG: 50 INJECTION INTRAMUSCULAR; INTRAVENOUS at 18:57

## 2025-05-04 RX ADMIN — MORPHINE SULFATE 4 MG: 4 INJECTION, SOLUTION INTRAMUSCULAR; INTRAVENOUS at 11:51

## 2025-05-04 RX ADMIN — HYDRALAZINE HYDROCHLORIDE 2 MG: 20 INJECTION INTRAMUSCULAR; INTRAVENOUS at 18:13

## 2025-05-04 RX ADMIN — MAGNESIUM SULFATE HEPTAHYDRATE 2 G: 2 INJECTION, SOLUTION INTRAVENOUS at 13:49

## 2025-05-04 RX ADMIN — HYDROMORPHONE HYDROCHLORIDE 0.5 MG: 1 INJECTION, SOLUTION INTRAMUSCULAR; INTRAVENOUS; SUBCUTANEOUS at 18:54

## 2025-05-04 RX ADMIN — MORPHINE SULFATE 4 MG: 4 INJECTION, SOLUTION INTRAMUSCULAR; INTRAVENOUS at 12:11

## 2025-05-04 RX ADMIN — HYDROMORPHONE HYDROCHLORIDE 0.5 MG: 1 INJECTION, SOLUTION INTRAMUSCULAR; INTRAVENOUS; SUBCUTANEOUS at 16:40

## 2025-05-04 RX ADMIN — HYDRALAZINE HYDROCHLORIDE 2 MG: 20 INJECTION INTRAMUSCULAR; INTRAVENOUS at 18:24

## 2025-05-04 RX ADMIN — Medication 100 MG: at 16:03

## 2025-05-04 RX ADMIN — MIDAZOLAM 2 MG: 1 INJECTION INTRAMUSCULAR; INTRAVENOUS at 15:47

## 2025-05-04 RX ADMIN — OXYCODONE HYDROCHLORIDE 5 MG: 5 TABLET ORAL at 20:39

## 2025-05-04 RX ADMIN — SODIUM CHLORIDE, SODIUM LACTATE, POTASSIUM CHLORIDE, AND CALCIUM CHLORIDE: .6; .31; .03; .02 INJECTION, SOLUTION INTRAVENOUS at 23:33

## 2025-05-04 RX ADMIN — KETOROLAC TROMETHAMINE 15 MG: 30 INJECTION, SOLUTION INTRAMUSCULAR at 18:16

## 2025-05-04 RX ADMIN — Medication 20 MG: at 17:27

## 2025-05-04 RX ADMIN — FENTANYL CITRATE 100 MCG: 50 INJECTION INTRAMUSCULAR; INTRAVENOUS at 16:03

## 2025-05-04 RX ADMIN — HYDROMORPHONE HYDROCHLORIDE 0.5 MG: 1 INJECTION, SOLUTION INTRAMUSCULAR; INTRAVENOUS; SUBCUTANEOUS at 13:53

## 2025-05-04 RX ADMIN — SODIUM CHLORIDE 1000 ML: 0.9 INJECTION, SOLUTION INTRAVENOUS at 11:49

## 2025-05-04 RX ADMIN — POTASSIUM CHLORIDE 10 MEQ: 7.46 INJECTION, SOLUTION INTRAVENOUS at 13:06

## 2025-05-04 RX ADMIN — MIDAZOLAM 1 MG: 1 INJECTION INTRAMUSCULAR; INTRAVENOUS at 11:54

## 2025-05-04 ASSESSMENT — ACTIVITIES OF DAILY LIVING (ADL)
ADLS_ACUITY_SCORE: 53
ADLS_ACUITY_SCORE: 54
ADLS_ACUITY_SCORE: 48
ADLS_ACUITY_SCORE: 53
ADLS_ACUITY_SCORE: 48
ADLS_ACUITY_SCORE: 53
ADLS_ACUITY_SCORE: 53
ADLS_ACUITY_SCORE: 54
ADLS_ACUITY_SCORE: 48
ADLS_ACUITY_SCORE: 54

## 2025-05-04 ASSESSMENT — ENCOUNTER SYMPTOMS: SEIZURES: 0

## 2025-05-04 ASSESSMENT — LIFESTYLE VARIABLES: TOBACCO_USE: 0

## 2025-05-04 ASSESSMENT — COLUMBIA-SUICIDE SEVERITY RATING SCALE - C-SSRS
2. HAVE YOU ACTUALLY HAD ANY THOUGHTS OF KILLING YOURSELF IN THE PAST MONTH?: NO
1. IN THE PAST MONTH, HAVE YOU WISHED YOU WERE DEAD OR WISHED YOU COULD GO TO SLEEP AND NOT WAKE UP?: NO
6. HAVE YOU EVER DONE ANYTHING, STARTED TO DO ANYTHING, OR PREPARED TO DO ANYTHING TO END YOUR LIFE?: NO

## 2025-05-04 NOTE — ANESTHESIA PREPROCEDURE EVALUATION
Anesthesia Pre-Procedure Evaluation    Patient: Nadira Perez   MRN: [id number] : 1950        Procedure : Procedure(s):  LAPAROSCOPY, DIAGNOSTIC with laparoscopic hernia repair; possible open, possible bowel resection.          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    Thyroid disease       Past Surgical History:   Procedure Laterality Date    ABDOMEN SURGERY      ovarian cyst, mesh    ARTHRODESIS WRIST Right     ARTHRODESIS WRIST  2013    Procedure: ARTHRODESIS WRIST;  left wrist scaphoid excision, four bone fusion, iliac crest bone graft  ( Mac with block);  Surgeon: Av Mendez MD;  [facility]    BIOPSY      skin, vaginal    BLEPHAROPLASTY BILATERAL Bilateral 2022    Procedure: UPPER BLEPHAROPLASTY, BILATERAL;  Surgeon: Jemal Sanchez MD;  Location: [facility] OR    CATARACT IOL, RT/LT Right 2018    CATARACT IOL, RT/LT Left 2018    COLONOSCOPY  2013    Procedure: COMBINED COLONOSCOPY, SINGLE BIOPSY/POLYPECTOMY BY BIOPSY;  COLONOSCOPY;  Surgeon: Dom Alvarez MD;  Location:  GI    COLONOSCOPY N/A 3/12/2024    Procedure: COLONOSCOPY, FLEXIBLE, WITH LESION REMOVAL USING SNARE;  Surgeon: Amina Espinoza MD;  Location:  GI    COSMETIC BLEPHAROPLASTY LOWER LIDS BILATERAL Bilateral 2022    Procedure: BLEPHAROPLASTY, LOWER EYELID, BILATERAL, COSMETIC;  Surgeon: Jemal Sanhcez MD;  Location: [facility] OR    COSMETIC SURGERY      ESOPHAGOSCOPY, GASTROSCOPY, DUODENOSCOPY (EGD),  COMBINED N/A 11/23/2016    Procedure: COMBINED ESOPHAGOSCOPY, GASTROSCOPY, DUODENOSCOPY (EGD);  Surgeon: Quinten Feliciano MD;  Location:  GI    ESOPHAGOSCOPY, GASTROSCOPY, DUODENOSCOPY (EGD), COMBINED N/A 3/12/2024    Procedure: ESOPHAGOGASTRODUODENOSCOPY, WITH BIOPSY;  Surgeon: Amina Espinoza MD;  Location:  GI    EXTERNAL EAR SURGERY      right    EYE SURGERY      radial keratomy    FUSION, SPINE, INTERBODY, OBLIQUE ANTERIOR AND LUMBAR, 2 LEVELS, POST APPROACH, USING OTS N/A 11/18/2021    Procedure: Part 1: Oblique Anterior Interbody Fusion at Lumbar 5 to sacral 1 with use of Bone Morphogenic Protein,;  Surgeon: Serge Ramirez MD;  [facility]    GI SURGERY      Umbilical hernia repair    GRAFT BONE FROM ILIAC CREST  02/14/2013    Procedure: GRAFT BONE FROM ILIAC CREST;  mac with block and local infilitration;  Surgeon: Av Mendez MD;  [facility]     BREATH HYDROGEN TEST N/A 10/14/2016    Procedure: HYDROGEN BREATH TEST;  Surgeon: Cheri Barron MD;  Location:  GI    HERNIA REPAIR      umbilical age 18 mos.    HYSTERECTOMY TOTAL ABDOMINAL  05/03/2000    INJECT EPIDURAL CAUDAL N/A 09/12/2023    Procedure: Caudal epidural steroid injection with fluoroscopy;  Surgeon: Natasha Umaña MD;  Location: [facility] OR    INJECT EPIDURAL CAUDAL N/A 1/2/2024    Procedure: INJECTION, EPIDURAL, CAUDAL;  Surgeon: Natasha Umaña MD;  Location: [facility] OR    INJECT EPIDURAL LUMBAR N/A 05/23/2023    Procedure: L3-4 interlaminar epidural steroid injection with fluoroscopy ( left> right);  Surgeon: Natasha Umaña MD;  Location: [facility] OR    INJECT JOINT SACROILIAC Left 04/27/2023    Procedure: Left sacroiliac joint steroid injection with fluoroscopy;  Surgeon: Natasha Umaña MD;  Location: [facility] OR    INJECT JOINT SACROILIAC Bilateral 10/2/2024    Procedure: Bilateral sacroiliac joint injection;  Surgeon: Thais Saldivar MD;  Location: [facility] OR    INJECT SACROILIAC JOINT Bilateral  2/26/2025    Procedure: Bilateral sacroiliac joint steroid injections;  Surgeon: Thais Saldivar MD;  Location: [facility] OR    MASTECTOMY MODIFIED RADICAL Bilateral     bilateral; right breast prophylactic    OPTICAL TRACKING SYSTEM FUSION POSTERIOR SPINE LUMBAR N/A 11/18/2021    Procedure: Open Posterior Instrumented Spinal Fusion at Lumbar 5 to Sacral 1, with grimm aguayo osteotomy, use of O-Arm/Stealth;  Surgeon: Serge Ramirez MD;  [facility]    OTOPLASTY Bilateral 7/25/2024    Procedure: Right Revision Functional Otoplasty;  Surgeon: Juan Agudelo MD;  Location: [facility] OR    PARATHYROIDECTOMY  09/23/2004    R SUPERIOR    PARATHYROIDECTOMY  09/23/2004    parathyroid resection, subtotal    RELEASE CARPAL TUNNEL Right 12/02/2021    Procedure: RIGHT CARPAL TUNNEL RELEASE, RIGHT WRIST HARDWARE REMOVAL, RIGHT CARPAL BOSS EXCISION;  Surgeon: Rolan Conley MD;  Location: [facility] OR    REMOVE HARDWARE HAND  09/24/2013    Procedure: REMOVE HARDWARE HAND;  Left Hand Screw Removal       RHINOPLASTY  1968    thyr proc skin closed cosmetic manner by subcuticular stitch  01/23/2009    THYROPLASTY  10/09/2009    TONSILLECTOMY  1977    VITRECTOMY PARSPLANA WITH 25 GAUGE SYSTEM Left 06/20/2022    Procedure: LEFT EYE VITRECTOMY, PARS PLANA APPROACH, USING 25-GAUGE INSTRUMENTS, laser;  Surgeon: Felix Carlos MD;  Location: [facility] OR    WRIST SURGERY      wrist arthrodesis      Allergies   Allergen Reactions    Erythromycin Nausea    Penicillins Hives     Around age 6 - doesn't recall full reaction, mother told her it was hives.     Has tolerated cephalsporins.       Social History     Tobacco Use    Smoking status: Never     Passive exposure: Never    Smokeless tobacco: Never   Substance Use Topics    Alcohol use: Yes     Alcohol/week: 7.0 standard drinks of alcohol     Types: 7 Glasses of wine per week     Comment: Rare if ever      Wt Readings from Last 1 Encounters:   02/26/25 77.6 kg (171 lb)         Anesthesia Evaluation   Pt has had prior anesthetic. Type: MAC and General.    History of anesthetic complications  - PONV.      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  States she may need smaller ETT) -                          (-) tobacco use   Neurologic:  - neg neurologic ROS  (-) no seizures and no CVA   Cardiovascular: Comment: MRI 4/2024  CONCLUSIONS:   1. Mildly reduced LV systolic function with LVEF of 50%.  2. Late gadolinium enhancement imaging shows mid inferolateral wall subendocardial delayed enhancement that  could represent a small size infarct.      (+) Dyslipidemia hypertension- -   -  - -                                 Previous cardiac testing   Echo: Date: 4/2024 Results:  Left ventricular function is decreased. The ejection fraction is 50-55%  (borderline). Mid inferolateral akinesis is present.  Global right ventricular function is normal.  No pericardial effusion is present.  Stress Test:  Date: Results:    ECG Reviewed:  Date: 6/2024 Results:  Sinus rhythm   Left axis deviation   Incomplete right bundle branch block   Minimal voltage criteria for LVH, may be normal variant   Cannot rule out Inferior infarct (cited on or before 16-FEB-2024)   Abnormal ECG     Cath:  Date: Results:   (-) taking anticoagulants/antiplatelets   METS/Exercise Tolerance: >4 METS Comment: Cycling, reformer. Denies cardiac symptoms   Hematologic:     (+)       history of blood transfusion, no previous transfusion reaction,     (-) history of blood clots   Musculoskeletal: Comment: Congenital deformity of ear  Sternal fracture in February       GI/Hepatic:     (+)      hiatal hernia,              Renal/Genitourinary:  - neg Renal ROS     Endo:     (+)  type II DM, Last HgA1c: 6.2, date: 3/2024, Not using insulin,     thyroid problem,  hypothyroidism,        (-) chronic steroid usage   Psychiatric/Substance Use:  - neg psychiatric ROS     Infectious Disease:  - neg infectious disease ROS     Malignancy:  - neg malignancy ROS (+) Malignancy, History of Skin and Breast.    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 EGS consulted for incarcerated ventral hernia.           Physical Exam    Airway        Mallampati: II   TM distance: > 3 FB   Neck ROM: full   Mouth opening: > 3 cm    Respiratory Devices and Support         Dental       (+) Modest Abnormalities - crowns, retainers, 1 or 2 missing teeth      Cardiovascular          Rhythm and rate: regular and normal     Pulmonary                   OUTSIDE LABS:  CBC:   Lab Results   Component Value Date    WBC 6.8 05/04/2025    WBC 8.4 03/14/2025    HGB 9.6 (L) 05/04/2025    HGB 11.7 03/14/2025    HCT 28.9 (L) 05/04/2025    HCT 35.2 03/14/2025     05/04/2025     03/14/2025     BMP:   Lab Results   Component Value Date     05/04/2025     03/30/2025    POTASSIUM 2.2 (LL) 05/04/2025    POTASSIUM 3.8 03/30/2025    CHLORIDE 114 (H) 05/04/2025    CHLORIDE 102 03/30/2025    CO2 18 (L) 05/04/2025    CO2 23 03/30/2025    BUN 18.1 05/04/2025    BUN 28.2 (H) 03/30/2025    CR 0.60 05/04/2025    CR 1.02 (H) 03/30/2025    GLC 98 05/04/2025     (H) 03/30/2025     COAGS:   Lab Results   Component Value Date    PTT 28 05/04/2025    INR 1.09 05/04/2025     POC:   Lab Results   Component Value Date    BGM 98 01/16/2008     HEPATIC:   Lab Results   Component Value Date    ALBUMIN 3.0 (L) 05/04/2025    PROTTOTAL 4.3 (L) 05/04/2025    ALT 14 05/04/2025    AST 17 05/04/2025    ALKPHOS 50 05/04/2025    BILITOTAL 0.4 05/04/2025      OTHER:   Lab Results   Component Value Date    PH 7.39 11/18/2021    LACT 1.2 05/04/2025    A1C 6.3 (H) 03/14/2025    RAMBO 6.3 (L) 05/04/2025    PHOS 3.7 11/09/2023    MAG 1.4 (L) 05/04/2025    LIPASE 177 09/15/2016    TSH 1.54 03/14/2025    T4 1.05 11/21/2011    CRP <2.9 12/08/2021    SED 31 (H) 12/08/2021       Anesthesia Plan    ASA Status:  3    NPO Status:  ELEVATED Aspiration Risk/Unknown    Anesthesia Type: General.     - Airway: ETT   Induction: RSI.   Maintenance: Balanced.   Techniques and Equipment:     - Lines/Monitors: 2nd IV, BIS     - Blood: T&S     Consents    Anesthesia Plan(s) and associated risks, benefits, and realistic alternatives discussed. Questions answered and patient/representative(s) expressed understanding.     - Discussed: Risks, Benefits and Alternatives for BOTH SEDATION and the PROCEDURE were discussed     - Discussed with:  Patient      - Extended Intubation/Ventilatory Support Discussed: No.      - Patient is DNR/DNI Status: No     Use of blood products discussed: Yes.     - Discussed with: Patient.     - Consented: consented to blood products     Postoperative Care    Pain management: Multi-modal analgesia, Peripheral nerve block (Single Shot) (Consented for regional anesthesia, plan for BL QL2 blocks in OR if procedure converts to open).   PONV prophylaxis: Ondansetron (or other 5HT-3), Background Propofol Infusion, Dexamethasone or Solumedrol     Comments:               MD Schuyler Wagner MD    Department of Anesthesiology      Clinically Significant Risk Factors Present on Admission        # Hypokalemia: Lowest K = 2.2 mmol/L in last 2 days, will replace as needed   # Hyperchloremia: Highest Cl = 114 mmol/L in last 2 days, will monitor as appropriate        # Hypomagnesemia: Lowest Mg = 1.4 mg/dL in last 2 days, will replace as needed   # Hypoalbuminemia: Lowest albumin = 3 g/dL at 5/4/2025 12:12 PM, will monitor as appropriate     #  Hypertension: Noted on problem list      # Anemia: based on hgb <11

## 2025-05-04 NOTE — CONSULTS
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 EGS consulted for incarcerated ventral hernia.     Attempted to reduce hernia at OSH. Notable for vitals wnl without a lactic acidosis. NGT placed at OSH. CT A/P demonstrated small bowel containing ventral hernia with small amount of fluid within hernia sac.     - booked and consented for lap hernia repair with possible SBR  - NPO w/ mIVFs  - NGT to LCS   - admit to EGS post-operatively        Drains: PRESENT          - May have additional drains, review Avatar  Code Status:  Full    Clinically Significant Risk Factors Present on Admission        # Hypokalemia: Lowest K = 2.2 mmol/L in last 2 days, will replace as needed   # Hyperchloremia: Highest Cl = 114 mmol/L in last 2 days, will monitor as appropriate        # Hypomagnesemia: Lowest Mg = 1.4 mg/dL in last 2 days, will replace as needed   # Hypoalbuminemia: Lowest albumin = 3 g/dL at 5/4/2025 12:12 PM, will monitor as appropriate     # Hypertension: Noted on problem list      # Anemia: based on hgb <11                The patient's care was discussed with the Attending Physician, Dr. Cintron .    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 EGS consulted for incarcerated ventral hernia.     Patient reports having nausea yesterday evening and has a known ventral hernia that in past imaging was fat containing. She states that her hernia is normally not incarcerated, and that's he saw a general surgeon regarding it about 1-2 years ago. At that time, she states that she was told to reduce the hernia if it became incarcerated and that she may be a candidate for laparoscopic repair at a later time. She states that she awoke this morning with an incarcerated hernia with associated pain at hernia site. She reports having nausea, retching, and clear colored emesis. She states that she had a small BM this morning and a small amount of flatus this morning. She is not on blood thinners and denies any PO intake today. At OSH, her K was 2.2 and OSH provider was unable to reduce her hernia prompting transfer to [facility]. Her main concern is her back pain which is chronic.     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    Thyroid disease      Past Surgical History   Past Surgical History:   Procedure Laterality Date    ABDOMEN SURGERY      ovarian cyst, mesh    ARTHRODESIS WRIST Right      ARTHRODESIS WRIST  02/14/2013    Procedure: ARTHRODESIS WRIST;  left wrist scaphoid excision, four bone fusion, iliac crest bone graft  ( Mac with block);  Surgeon: Av Mendez MD;  [facility]    BIOPSY      skin, vaginal    BLEPHAROPLASTY BILATERAL Bilateral 05/06/2022    Procedure: UPPER BLEPHAROPLASTY, BILATERAL;  Surgeon: Jemal Sanchez MD;  Location: [facility] OR    CATARACT IOL, RT/LT Right 03/13/2018    CATARACT IOL, RT/LT Left 02/20/2018    COLONOSCOPY  12/24/2013    Procedure: COMBINED COLONOSCOPY, SINGLE BIOPSY/POLYPECTOMY BY BIOPSY;  COLONOSCOPY;  Surgeon: Dom Alvarez MD;  Location:  GI    COLONOSCOPY N/A 3/12/2024    Procedure: COLONOSCOPY, FLEXIBLE, WITH LESION REMOVAL USING SNARE;  Surgeon: Amina Espinoza MD;  Location: [facility]    COSMETIC BLEPHAROPLASTY LOWER LIDS BILATERAL Bilateral 05/06/2022    Procedure: BLEPHAROPLASTY, LOWER EYELID, BILATERAL, COSMETIC;  Surgeon: Jemal Sanchez MD;  Location: [facility]    COSMETIC SURGERY      ESOPHAGOSCOPY, GASTROSCOPY, DUODENOSCOPY (EGD), COMBINED N/A 11/23/2016    Procedure: COMBINED ESOPHAGOSCOPY, GASTROSCOPY, DUODENOSCOPY (EGD);  Surgeon: Quinten Feliciano MD;  Location: [facility]    ESOPHAGOSCOPY, GASTROSCOPY, DUODENOSCOPY (EGD), COMBINED N/A 3/12/2024    Procedure: ESOPHAGOGASTRODUODENOSCOPY, WITH BIOPSY;  Surgeon: Amina Espinoza MD;  Location: [facility]    EXTERNAL EAR SURGERY      right    EYE SURGERY      radial keratomy    FUSION, SPINE, INTERBODY, OBLIQUE ANTERIOR AND LUMBAR, 2 LEVELS, POST APPROACH, USING OTS N/A 11/18/2021    Procedure: Part 1: Oblique Anterior Interbody Fusion at Lumbar 5 to sacral 1 with use of Bone Morphogenic Protein,;  Surgeon: Serge Ramirez MD;  Location:  OR    GI SURGERY      Umbilical hernia repair    GRAFT BONE FROM ILIAC CREST  02/14/2013    Procedure: GRAFT BONE FROM ILIAC CREST;  mac with block and local infilitration;  Surgeon: Av Mendez MD;  Location:  OR     BREATH  HYDROGEN TEST N/A 10/14/2016    Procedure: HYDROGEN BREATH TEST;  Surgeon: Cheri Barron MD;  Location: [facility] GI    HERNIA REPAIR      umbilical age 18 mos.    HYSTERECTOMY TOTAL ABDOMINAL  05/03/2000    INJECT EPIDURAL CAUDAL N/A 09/12/2023    Procedure: Caudal epidural steroid injection with fluoroscopy;  Surgeon: Natasha Umaña MD;  Location: [facility] OR    INJECT EPIDURAL CAUDAL N/A 1/2/2024    Procedure: INJECTION, EPIDURAL, CAUDAL;  Surgeon: Natasha Umaña MD;  Location: [facility] OR    INJECT EPIDURAL LUMBAR N/A 05/23/2023    Procedure: L3-4 interlaminar epidural steroid injection with fluoroscopy ( left> right);  Surgeon: Natasha Umaña MD;  Location: [facility] OR    INJECT JOINT SACROILIAC Left 04/27/2023    Procedure: Left sacroiliac joint steroid injection with fluoroscopy;  Surgeon: Natasha Umaña MD;  Location: [facility] OR    INJECT JOINT SACROILIAC Bilateral 10/2/2024    Procedure: Bilateral sacroiliac joint injection;  Surgeon: Thais Saldivar MD;  Location: [facility] OR    INJECT SACROILIAC JOINT Bilateral 2/26/2025    Procedure: Bilateral sacroiliac joint steroid injections;  Surgeon: Thais Saldivar MD;  Location: [facility] OR    MASTECTOMY MODIFIED RADICAL Bilateral     bilateral; right breast prophylactic    OPTICAL TRACKING SYSTEM FUSION POSTERIOR SPINE LUMBAR N/A 11/18/2021    Procedure: Open Posterior Instrumented Spinal Fusion at Lumbar 5 to Sacral 1, with grimm aguayo osteotomy, use of O-Arm/Stealth;  Surgeon: Serge Ramirez MD;  Location: [facility]    OTOPLASTY Bilateral 7/25/2024    Procedure: Right Revision Functional Otoplasty;  Surgeon: Juan Agudelo MD;  Location: [facility] OR    PARATHYROIDECTOMY  09/23/2004    R SUPERIOR    PARATHYROIDECTOMY  09/23/2004    parathyroid resection, subtotal    RELEASE CARPAL TUNNEL Right 12/02/2021    Procedure: RIGHT CARPAL TUNNEL RELEASE, RIGHT WRIST HARDWARE REMOVAL, RIGHT CARPAL BOSS EXCISION;  Surgeon: Rolan Conley MD;  Location: [facility] OR    REMOVE  HARDWARE HAND  09/24/2013    Procedure: REMOVE HARDWARE HAND;  Left Hand Screw Removal       RHINOPLASTY  1968    thyr proc skin closed cosmetic manner by subcuticular stitch  01/23/2009    THYROPLASTY  10/09/2009    TONSILLECTOMY  1977    VITRECTOMY PARSPLANA WITH 25 GAUGE SYSTEM Left 06/20/2022    Procedure: LEFT EYE VITRECTOMY, PARS PLANA APPROACH, USING 25-GAUGE INSTRUMENTS, laser;  Surgeon: Felix Carlos MD;  Location: [facility] OR    WRIST SURGERY      wrist arthrodesis     Prior to Admission Medications   I have reviewed this patient's current medications      Physical Exam   Vital Signs: Temp: 97.8  F (36.6  C) Temp src: Oral BP: (!) 167/106 Pulse: 97   Resp: 22 SpO2: 97 % O2 Device: None (Room air)    Weight: 0 lbs 0 ozNo intake or output data in the 24 hours ending 05/04/25 1456  General Appearance: NAD, in mild distress  Respiratory: no increased WOB on RA  Cardiovascular: normal rate and regular rhythm   GI: non-distended, firm incarcerated hernia without overlying skin thinning nor erythema, tenderness at hernia site, and mild diffuse abd tenderness though not peritonitic    Data     I have personally reviewed the following data over the past 24 hrs:    6.8  \   9.6 (L)   / 221     143 114 (H) 18.1 /  98   2.2 (LL) 18 (L) 0.60 \     ALT: 14 AST: 17 AP: 50 TBILI: 0.4   ALB: 3.0 (L) TOT PROTEIN: 4.3 (L) LIPASE: N/A     Procal: N/A CRP: N/A Lactic Acid: 1.2       INR:  1.09 PTT:  28   D-dimer:  N/A Fibrinogen:  N/A

## 2025-05-04 NOTE — ANESTHESIA PROCEDURE NOTES
Airway    Staff -        CRNA: Sana Guevara APRN CRNA       Performed By: CRNAIndications and Patient Condition       Indications for airway management: harriett-procedural       Induction type:intravenous (modified RSI)       Mask difficulty assessment: 1 - vent by mask    Final Airway Details       Final airway type: endotracheal airway       Successful airway: ETT - single  Endotracheal Airway Details        ETT size (mm): 6.5       Cuffed: yes       Successful intubation technique: video laryngoscopy       VL Blade Size: Glidescope 3 (G1 view when epiglottis lifted up, angled glidescope blade might provide better view. ETT advanced easily.)       Grade View of Cords: 1       Adjucts: stylet       Position: Right       Measured from: lips       Secured at (cm): 20       Bite block used: None    Post intubation assessment        Placement verified by: capnometry, equal breath sounds and chest rise        Number of attempts at approach: 1       Secured with: tape       Ease of procedure: easy       Dentition: Intact and Unchanged

## 2025-05-04 NOTE — ED NOTES
Critical potassium 2.2 as EMS was arriving, Dr. Varela informed and ordered K 10 mEq which was given. Patient also having severe pain so provider ordered Dilaudid 0.5 mg which was given too.   Patient has been on tele during this ER visit without abnormalities. No chest symptoms or EKG here. Patient transferring to [facility] ED at this time. [facility] ED updated with this information.

## 2025-05-04 NOTE — BRIEF OP NOTE
[facility]    Brief Operative Note    Pre-operative diagnosis: Obstruction concurrent with and due to hernia of abdominal wall [K43.6]  Post-operative diagnosis Ischemic small bowel    Procedure: LAPAROSCOPY, DIAGNOSTIC with lysis of adhesions, converted to open laparotomy with small bowel resection  x1, N/A - Abdomen    Surgeon: Surgeons and Role:     * Jacobo Cintron DO - Primary     * Yani Dominguez MD - Resident - Assisting  Anesthesia: General with Block   Estimated Blood Loss: 50 cc    Drains: None  Specimens:   ID Type Source Tests Collected by Time Destination   1 : Small Bowel Tissue Small Intestine SURGICAL PATHOLOGY EXAM Jacobo Cintron DO 5/4/2025  5:49 PM      Findings:  Tripathi hernia with strangulated small bowel through hernia defect. Resected 10 cm of small bowel segment with primary anastomosis and closed the hernia defect primarily (No mesh due to ischemic small bowel).    Complications: None.  Implants: * No implants in log *      Plan:   Admit to surgery  Pain control - S tylenol and robaxin. PRN oxy and dilaudid. QL block by anesthesia  No cardiac or pulmonary issues  NPO, IVF, NG to LIS  Keep riddle for now. Strict I/Os  Antibiotics for 24 hrs  SCD, PT/OT, Lovnox ppx POD 1 pending labs     Yani Dominguez MD  General Surgery PGY-7  [phone]

## 2025-05-04 NOTE — ED TRIAGE NOTES
Pt arrives from home with c/o hernia that popped out. Pt is actively vomiting and endorsing severe pain.     Triage Assessment (Adult)       Row Name 05/04/25 1135          Triage Assessment    Airway WDL WDL        Respiratory WDL    Respiratory WDL WDL        Skin Circulation/Temperature WDL    Skin Circulation/Temperature WDL WDL        Cardiac WDL    Cardiac WDL WDL        Cognitive/Neuro/Behavioral WDL    Cognitive/Neuro/Behavioral WDL X;mood/behavior     Level of Consciousness alert     Orientation oriented x 4     Mood/Behavior anxious        Ling Coma Scale    Best Eye Response 4-->(E4) spontaneous     Best Motor Response 6-->(M6) obeys commands     Best Verbal Response 5-->(V5) oriented     Ling Coma Scale Score 15

## 2025-05-04 NOTE — ED PROVIDER NOTES
Received phone call from [facility] ED regarding this patient coming over for surgery consultation with concern for incarcerated hernia.    On arrival patient is complaining of back pain more so than abdominal pain but does have significant tenderness at the site of the hernia.  It is not reducible.  She does not have significant leukocytosis or lactic acid elevation.  She does have significant electrolyte derangement with hypokalemia and hypomagnesemia.  She received additional doses of IV magnesium and IV potassium to help improve this.  EKG was ordered. Monitor appears reassuring.    The patient received additional IV pain medication.  She was transferred to the operating room for definitive management due to bowel contents and possible obstruction and will be admitted to the emergency general surgery service afterwards.    --  Subsequent Critical Care Addendum (Modifier -25)  This patient had an Emergency Department evaluation and management performed by  Dr. Varela  at an earlier time that the patient did not require critical care. Subsequently, the patient's state changed such that critical care was medically necessary. The critical care provided was separate and distinct from the Emergency Department evaluation and management performed prior.     Based on my evaluation of the patient, Nadira Perez has a high risk electrolyte abnormality, which requires immediate intervention, and therefore she is critically ill.     The care team initiated multiple lab tests and initiated medication therapy with IV K, IV Mg  to provide stabilization care. Due to the critical nature of this patient, I reassessed nursing observations, vital signs, physical exam, review of cardiac rhythm monitor, mental status, neurologic status, and respiratory status multiple times prior to her disposition.     Time also spent performing documentation, reviewing test results, discussion with consultants, and coordination of care.      Critical care time (excluding teaching time and procedures): 35 minutes.        Adi Montoya MD  05/04/25 9191

## 2025-05-04 NOTE — ANESTHESIA PROCEDURE NOTES
"Quadratus lumborum Procedure Note    Pre-Procedure   Staff -        Anesthesiologist:  Av Govea MD       Performed By: anesthesiologist       Location: OR       Procedure Start/Stop Times: 5/4/2025 6:48 PM and 5/4/2025 6:58 PM       Pre-Anesthestic Checklist: patient identified, IV checked, site marked, risks and benefits discussed, informed consent, monitors and equipment checked, pre-op evaluation and post-op pain management  Timeout:       Correct Patient: Yes        Correct Procedure: Yes        Correct Site: Yes        Correct Position: Yes        Correct Laterality: Yes        Site Marked: N/A  Procedure Documentation  Procedure: Quadratus lumborum         Laterality: bilateral       Patient Position: supine       Skin prep: Chloraprep       Needle Gauge: 21.        Needle Length (Inches): 4        Ultrasound guided       1. Ultrasound was used to identify targeted nerve, plexus, vascular marker, or fascial plane and place a needle adjacent to it in real-time.       2. Ultrasound was used to visualize the spread of anesthetic in close proximity to the above referenced structure.    Assessment/Narrative         The placement was negative for: blood aspirated and site bleeding       Insertion/Infusion Method: Single Shot       Injection made incrementally with aspirations every 3 mL.    Medication(s) Administered   Bupivacaine 0.5% PF (Infiltration) - Infiltration   30 mL - 5/4/2025 6:48:00 PM  Medication Administration Time: 5/4/2025 6:48 PM      FOR [facility] ([facility]) ONLY:   Pain Team Contact information: please page the Pain Team Via [employer]. Search \"Pain\". During daytime hours, please page the attending first. At night please page the resident first.      "

## 2025-05-04 NOTE — ED NOTES
Pt has an incarcerated hernia in the middle lower right abdomen. Report given. Pt pain not adequately controlled in the ed. NG placed by maryam RN.  Pt started on 10 meq of potassium due to critical potassium of 2.2. receiving ED updated.

## 2025-05-04 NOTE — ED PROVIDER NOTES
ED Provider Note  [facility] EMERGENCY DEPARTMENT ([facility])    5/04/25      ED PROVIDER NOTE        History     Chief Complaint   Patient presents with    Abdominal Pain    Hernia     Pt arrives from home with c/o hernia that popped out. Pt is actively vomiting and endorsing severe pain.     The history is provided by the patient and medical records.     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 Patient reports she woke up this morning with vomiting and abdominal pain. She then noticed her hiatal hernia had protruded. She reports it has been protruded for 3 hours. She reports her hiatal hernia has never protruded until today and she has never had to push it back in. She reports back pain as well and notes a history of chronic back pain. She took a oxycodone pill this morning but believes she may have vomited this. She has not eaten or drank anything all day. Last bowel movement was this morning.    Surgical history includes an abdominal hysterectomy and 2 prior ovarian cystectomies.     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    Thyroid disease      Past  Surgical History:   Procedure Laterality Date    ABDOMEN SURGERY      ovarian cyst, mesh    ARTHRODESIS WRIST Right     ARTHRODESIS WRIST  02/14/2013    Procedure: ARTHRODESIS WRIST;  left wrist scaphoid excision, four bone fusion, iliac crest bone graft  ( Mac with block);  Surgeon: Av Mendez MD;  [facility]    BIOPSY      skin, vaginal    BLEPHAROPLASTY BILATERAL Bilateral 05/06/2022    Procedure: UPPER BLEPHAROPLASTY, BILATERAL;  Surgeon: Jemal Sanchez MD;  [place] [facility] [place]    CATARACT IOL, RT/LT Right 03/13/2018    CATARACT IOL, RT/LT Left 02/20/2018    COLONOSCOPY  12/24/2013    Procedure: COMBINED COLONOSCOPY, SINGLE BIOPSY/POLYPECTOMY BY BIOPSY;  COLONOSCOPY;  Surgeon: Dom Alvarez MD;  Location: [facility]    COLONOSCOPY N/A 3/12/2024    Procedure: COLONOSCOPY, FLEXIBLE, WITH LESION REMOVAL USING SNARE;  Surgeon: Amina Espinoza MD;  Location: [facility]    COSMETIC BLEPHAROPLASTY LOWER LIDS BILATERAL Bilateral 05/06/2022    Procedure: BLEPHAROPLASTY, LOWER EYELID, BILATERAL, COSMETIC;  Surgeon: Jemal Sanchez MD;  [place] [facility] [place]    COSMETIC SURGERY      ESOPHAGOSCOPY, GASTROSCOPY, DUODENOSCOPY (EGD), COMBINED N/A 11/23/2016    Procedure: COMBINED ESOPHAGOSCOPY, GASTROSCOPY, DUODENOSCOPY (EGD);  Surgeon: Quinten Feliciano MD;  Location: [facility]    ESOPHAGOSCOPY, GASTROSCOPY, DUODENOSCOPY (EGD), COMBINED N/A 3/12/2024    Procedure: ESOPHAGOGASTRODUODENOSCOPY, WITH BIOPSY;  Surgeon: Amina Espinoza MD;  Location:  GI    EXTERNAL EAR SURGERY      right    EYE SURGERY      radial keratomy    FUSION, SPINE, INTERBODY, OBLIQUE ANTERIOR AND LUMBAR, 2 LEVELS, POST APPROACH, USING OTS N/A 11/18/2021    Procedure: Part 1: Oblique Anterior Interbody Fusion at Lumbar 5 to sacral 1 with use of Bone Morphogenic Protein,;  Surgeon: Serge Ramirez MD;  Location:  OR    GI SURGERY      Umbilical hernia repair    GRAFT BONE FROM ILIAC CREST  02/14/2013    Procedure: GRAFT BONE FROM ILIAC  CREST;  mac with block and local infilitration;  Surgeon: Av Mendez MD;  [facility]    HC BREATH HYDROGEN TEST N/A 10/14/2016    Procedure: HYDROGEN BREATH TEST;  Surgeon: Cheri Barron MD;  Location: [facility] GI    HERNIA REPAIR      umbilical age 18 mos.    HYSTERECTOMY TOTAL ABDOMINAL  05/03/2000    INJECT EPIDURAL CAUDAL N/A 09/12/2023    Procedure: Caudal epidural steroid injection with fluoroscopy;  Surgeon: Natasha Umaña MD;  Location: [facility] OR    INJECT EPIDURAL CAUDAL N/A 1/2/2024    Procedure: INJECTION, EPIDURAL, CAUDAL;  Surgeon: Natasha Umaña MD;  Location: [facility] OR    INJECT EPIDURAL LUMBAR N/A 05/23/2023    Procedure: L3-4 interlaminar epidural steroid injection with fluoroscopy ( left> right);  Surgeon: Natasha Umaña MD;  Location: [facility] OR    INJECT JOINT SACROILIAC Left 04/27/2023    Procedure: Left sacroiliac joint steroid injection with fluoroscopy;  Surgeon: Natasha Umaña MD;  Location: [facility] OR    INJECT JOINT SACROILIAC Bilateral 10/2/2024    Procedure: Bilateral sacroiliac joint injection;  Surgeon: Thais Saldivar MD;  Location: [facility] OR    INJECT SACROILIAC JOINT Bilateral 2/26/2025    Procedure: Bilateral sacroiliac joint steroid injections;  Surgeon: Thais Saldivar MD;  Location: [facility] OR    MASTECTOMY MODIFIED RADICAL Bilateral     bilateral; right breast prophylactic    OPTICAL TRACKING SYSTEM FUSION POSTERIOR SPINE LUMBAR N/A 11/18/2021    Procedure: Open Posterior Instrumented Spinal Fusion at Lumbar 5 to Sacral 1, with grimm aguayo osteotomy, use of O-Arm/Stealth;  Surgeon: Serge Ramirez MD;  [facility]: [facility]    OTOPLASTY Bilateral 7/25/2024    Procedure: Right Revision Functional Otoplasty;  Surgeon: Juan Agudelo MD;  Location: [facility] OR    PARATHYROIDECTOMY  09/23/2004    R SUPERIOR    PARATHYROIDECTOMY  09/23/2004    parathyroid resection, subtotal    RELEASE CARPAL TUNNEL Right 12/02/2021    Procedure: RIGHT CARPAL TUNNEL  RELEASE, RIGHT WRIST HARDWARE REMOVAL, RIGHT CARPAL BOSS EXCISION;  Surgeon: Rolan Conley MD;  Location: [facility] OR    REMOVE HARDWARE HAND  09/24/2013    Procedure: REMOVE HARDWARE HAND;  Left Hand Screw Removal       RHINOPLASTY  1968    thyr proc skin closed cosmetic manner by subcuticular stitch  01/23/2009    THYROPLASTY  10/09/2009    TONSILLECTOMY  1977    VITRECTOMY PARSPLANA WITH 25 GAUGE SYSTEM Left 06/20/2022    Procedure: LEFT EYE VITRECTOMY, PARS PLANA APPROACH, USING 25-GAUGE INSTRUMENTS, laser;  Surgeon: Felix Carlos MD;  Location: [facility] OR    WRIST SURGERY      wrist arthrodesis     acetaminophen (TYLENOL) 500 MG tablet  artificial tears OINT ophthalmic ointment  Ascorbic Acid (VITAMIN C) 500 MG CHEW  atorvastatin (LIPITOR) 80 MG tablet  carvedilol (COREG) 12.5 MG tablet  CRANBERRY EXTRACT PO  diclofenac (VOLTAREN) 1 % topical gel  gabapentin (NEURONTIN) 300 MG capsule  HEMP OIL OR EXTRACT OR OTHER CBD CANNABINOID, NOT MEDICAL CANNABIS,  hydrochlorothiazide (HYDRODIURIL) 25 MG tablet  levothyroxine (SYNTHROID/LEVOTHROID) 112 MCG tablet  lisinopril (ZESTRIL) 20 MG tablet  MAGNESIUM GLYCINATE PO  Melatonin 10 MG TABS tablet  Multiple Vitamin (MULTI-VITAMIN) per tablet  omeprazole (PRILOSEC) 20 MG DR capsule  ondansetron (ZOFRAN) 4 MG tablet  ondansetron (ZOFRAN-ODT) 4 MG ODT tab  oxyCODONE (ROXICODONE) 5 MG tablet  oxyCODONE (ROXICODONE) 5 MG tablet  spironolactone (ALDACTONE) 25 MG tablet  tiZANidine (ZANAFLEX) 4 MG tablet  Turmeric 500 MG CAPS      Allergies   Allergen Reactions    Erythromycin Nausea    Penicillins Hives     Around age 6 - doesn't recall full reaction, mother told her it was hives.     Has tolerated cephalsporins.      Family History  Family History   Problem Relation Age of Onset    Neurologic Disorder Mother         Anuerysm of Cerebral Artery, Dementia    Diabetes Mother     Thyroid Disease Mother         Hyperthyroidism (goiter)    Cerebrovascular Disease Mother          Hemorrhagic    Dementia Mother     Osteoporosis Mother     Hyperlipidemia Mother     Heart Disease Father         AAA    Hypertension Father     Coronary Artery Disease Father     Hyperlipidemia Father     Mental Illness Father         Schizophrenia?    Dementia Brother         hydrocephalis    Circulatory Brother         Perihperal Neurophathy    Peripheral Neuropathy Brother     Peripheral Neuropathy Brother     Diabetes Maternal Grandmother         Presumed Type 2    Asthma Maternal Grandmother     Chronic Obstructive Pulmonary Disease Maternal Grandfather         father    Asthma Maternal Grandfather     Diabetes Maternal Aunt         x2    Melanoma Maternal Aunt     Glaucoma Maternal Aunt     Breast Cancer Cousin     Breast Cancer Cousin     Dementia Other     Cancer Other         malignant melanoma    Hypertension Other     Hypertension Other     Cerebrovascular Disease Other     Cerebrovascular Disease Other     Obesity Other     Respiratory Other     Cancer Other     Diabetes Other     Asthma Other     Other Cancer Other         Malignant melanoma    Obesity Other     Macular Degeneration No family hx of     Kidney Disease No family hx of     Thrombosis No family hx of     Arthritis No family hx of     Depression No family hx of     Substance Abuse No family hx of     Cystic Fibrosis No family hx of     Early Death No family hx of     Coronary Artery Disease Early Onset No family hx of     Heart Failure No family hx of     Bleeding Diathesis No family hx of     Ovarian Cancer No family hx of     Uterine Cancer No family hx of     Prostate Cancer No family hx of     Colorectal Cancer No family hx of     Pancreatic Cancer No family hx of     Lung Cancer No family hx of     Autoimmune Disease No family hx of     Unknown/Adopted No family hx of     Genetic Disorder No family hx of     Bleeding Disorder No family hx of     Clotting Disorder No family hx of     Anesthesia Reaction No family hx of      Social  History   Social History     Tobacco Use    Smoking status: Never     Passive exposure: Never    Smokeless tobacco: Never   Vaping Use    Vaping status: Never Used   Substance Use Topics    Alcohol use: Yes     Alcohol/week: 7.0 standard drinks of alcohol     Types: 7 Glasses of wine per week     Comment: Rare if ever    Drug use: Yes     Types: Marijuana     Comment: Smoke through water filter for chronic back pain PRN.      A complete review of systems was performed with pertinent positives and negatives noted in the HPI, and all other systems negative.    Physical Exam   BP: (!) 171/113  Pulse: 84  Temp: 97.8  F (36.6  C)  Resp: 22  SpO2: 92 %  Physical Exam  Vitals and nursing note reviewed.   Constitutional:       General: She is in acute distress (Pale, retching, uncomfortable).      Appearance: Normal appearance. She is not diaphoretic.   HENT:      Head: Atraumatic.      Mouth/Throat:      Mouth: Mucous membranes are moist.   Eyes:      General: No scleral icterus.     Conjunctiva/sclera: Conjunctivae normal.   Cardiovascular:      Rate and Rhythm: Normal rate.      Heart sounds: Normal heart sounds.   Pulmonary:      Effort: No respiratory distress.      Breath sounds: Normal breath sounds.   Abdominal:      General: Abdomen is flat. Bowel sounds are decreased.      Palpations: There is mass.      Tenderness: There is abdominal tenderness in the right lower quadrant.      Comments: She has a palpable baseball sized mass in the right abdomen to the right of the umbilicus consistent with a abdominal wall hernia which cannot be reduced.   Musculoskeletal:      Cervical back: Neck supple.      Lumbar back: Tenderness present.      Comments: She has severe chronic back pain continues to be uncomfortable and tender to palpation   Skin:     General: Skin is warm.      Findings: No rash.   Neurological:      General: No focal deficit present.      Mental Status: She is alert and oriented to person, place, and time.            ED Course, Procedures, & Data      Procedures                 Results for orders placed or performed during the hospital encounter of 05/04/25   Comprehensive metabolic panel     Status: Abnormal   Result Value Ref Range    Sodium 143 135 - 145 mmol/L    Potassium 2.2 (LL) 3.4 - 5.3 mmol/L    Carbon Dioxide (CO2) 18 (L) 22 - 29 mmol/L    Anion Gap 11 7 - 15 mmol/L    Urea Nitrogen 18.1 8.0 - 23.0 mg/dL    Creatinine 0.60 0.51 - 0.95 mg/dL    GFR Estimate >90 >60 mL/min/1.73m2    Calcium 6.3 (L) 8.8 - 10.4 mg/dL    Chloride 114 (H) 98 - 107 mmol/L    Glucose 98 70 - 99 mg/dL    Alkaline Phosphatase 50 40 - 150 U/L    AST 17 0 - 45 U/L    ALT 14 0 - 50 U/L    Protein Total 4.3 (L) 6.4 - 8.3 g/dL    Albumin 3.0 (L) 3.5 - 5.2 g/dL    Bilirubin Total 0.4 <=1.2 mg/dL   Lactic acid whole blood with 1x repeat in 2 hr when >2     Status: Normal   Result Value Ref Range    Lactic Acid, Initial 1.2 0.7 - 2.0 mmol/L   INR     Status: Normal   Result Value Ref Range    INR 1.09 0.85 - 1.15    PT 14.5 11.8 - 14.8 Seconds   Partial thromboplastin time     Status: Normal   Result Value Ref Range    aPTT 28 22 - 38 Seconds   CBC with platelets and differential     Status: Abnormal   Result Value Ref Range    WBC Count 6.8 4.0 - 11.0 10e3/uL    RBC Count 3.23 (L) 3.80 - 5.20 10e6/uL    Hemoglobin 9.6 (L) 11.7 - 15.7 g/dL    Hematocrit 28.9 (L) 35.0 - 47.0 %    MCV 90 78 - 100 fL    MCH 29.7 26.5 - 33.0 pg    MCHC 33.2 31.5 - 36.5 g/dL    RDW 15.3 (H) 10.0 - 15.0 %    Platelet Count 221 150 - 450 10e3/uL    % Neutrophils 82 %    % Lymphocytes 10 %    % Monocytes 6 %    % Eosinophils 1 %    % Basophils 0 %    % Immature Granulocytes 1 %    NRBCs per 100 WBC 0 <1 /100    Absolute Neutrophils 5.5 1.6 - 8.3 10e3/uL    Absolute Lymphocytes 0.7 (L) 0.8 - 5.3 10e3/uL    Absolute Monocytes 0.4 0.0 - 1.3 10e3/uL    Absolute Eosinophils 0.1 0.0 - 0.7 10e3/uL    Absolute Basophils 0.0 0.0 - 0.2 10e3/uL    Absolute Immature Granulocytes  0.1 <=0.4 10e3/uL    Absolute NRBCs 0.0 10e3/uL   CBC with platelets differential     Status: Abnormal    Narrative    The following orders were created for panel order CBC with platelets differential.  Procedure                               Abnormality         Status                     ---------                               -----------         ------                     CBC with platelets and ...[[id number]]  Abnormal            Final result                 Please view results for these tests on the individual orders.     Medications   butamben-tetracaine-benzocaine (CETACAINE) spray 1 spray (has no administration in time range)   potassium chloride 10 mEq in 100 mL sterile water infusion ( Intravenous Rate/Dose Change 5/4/25 1317)   sodium chloride 0.9% BOLUS 1,000 mL (1,000 mLs Intravenous $New Bag 5/4/25 1149)   morphine (PF) injection 4 mg (4 mg Intravenous $Given 5/4/25 1151)   ondansetron (ZOFRAN) injection 4 mg (4 mg Intravenous $Given 5/4/25 1150)   midazolam (VERSED) injection 1 mg (1 mg Intravenous $Given 5/4/25 1154)   morphine (PF) injection 4 mg (4 mg Intravenous $Given 5/4/25 1211)   morphine (PF) injection 2 mg (2 mg Intravenous $Given 5/4/25 1241)   HYDROmorphone (PF) (DILAUDID) injection 0.5 mg (0.5 mg Intravenous $Given 5/4/25 1303)     Labs Ordered and Resulted from Time of ED Arrival to Time of ED Departure   COMPREHENSIVE METABOLIC PANEL - Abnormal       Result Value    Sodium 143      Potassium 2.2 (*)     Carbon Dioxide (CO2) 18 (*)     Anion Gap 11      Urea Nitrogen 18.1      Creatinine 0.60      GFR Estimate >90      Calcium 6.3 (*)     Chloride 114 (*)     Glucose 98      Alkaline Phosphatase 50      AST 17      ALT 14      Protein Total 4.3 (*)     Albumin 3.0 (*)     Bilirubin Total 0.4     CBC WITH PLATELETS AND DIFFERENTIAL - Abnormal    WBC Count 6.8      RBC Count 3.23 (*)     Hemoglobin 9.6 (*)     Hematocrit 28.9 (*)     MCV 90      MCH 29.7      MCHC 33.2      RDW 15.3 (*)      Platelet Count 221      % Neutrophils 82      % Lymphocytes 10      % Monocytes 6      % Eosinophils 1      % Basophils 0      % Immature Granulocytes 1      NRBCs per 100 WBC 0      Absolute Neutrophils 5.5      Absolute Lymphocytes 0.7 (*)     Absolute Monocytes 0.4      Absolute Eosinophils 0.1      Absolute Basophils 0.0      Absolute Immature Granulocytes 0.1      Absolute NRBCs 0.0     LACTIC ACID WHOLE BLOOD WITH 1X REPEAT IN 2 HR WHEN >2 - Normal    Lactic Acid, Initial 1.2     INR - Normal    INR 1.09      PT 14.5     PARTIAL THROMBOPLASTIN TIME - Normal    aPTT 28     MAGNESIUM   ABO/RH TYPE AND SCREEN     CT Abdomen Pelvis w Contrast    (Results Pending)   XR Chest Port 1 View    (Results Pending)          Critical Care Addendum  My initial assessment, based on my review of nursing observations, review of vital signs, focused history, and physical exam, established a high suspicion that Nadira Perez has  incarcerated hernia with risk for ischemia to bowel , which requires immediate intervention, and therefore she is critically ill.     After the initial assessment, the care team initiated multiple lab tests, initiated IV fluid administration, initiated medication therapy with IV morphine, IV Dilaudid, Versed, IV Zofran, consulted with general surgery, and performed multiple attempts at bedtime to reduce hernia  to provide stabilization care. Due to the critical nature of this patient, I reassessed physical exam and and further discussion with surgery, discussion with the [facility] ED regarding transfer, placement of NG tube  multiple times prior to her disposition.     Time also spent performing documentation, discussion with consultants, coordination of care, and and titration of pain medication .     Critical care time (excluding teaching time and procedures): 35 minutes.       Assessment & Plan    74-year-old patient with multiple chronic medical problems including peripheral neuropathy, breast  cancer, chronic back pain, and a known ventral abdominal hernia which has not been problematic in the past.  This morning she noted a painful tennis ball sized mass in the right mid abdomen and was vomiting throughout the morning.  She states in the past she was able to reduce this mass but this morning could not.  On exam she is pale, uncomfortable, vomiting into a bag.  Status normal.  She has the aforementioned mass in the right abdomen consistent with an incarcerated hernia which cannot be reduced manually.  I made multiple attempts to manually reduce including giving serial doses of morphine, Versed and Zofran.  Unsuccessful in my reduction attempts.  Contacted surgery.  An NG tube was placed.  She is going to be transferred to the [facility] for surgical consultation and more definitive management.  Labs and abdominal CT also ordered, patient was delayed getting to CT and transport is here would not delay transport for the CT as per my discussion with the surgical staff and the ED physician at the [facility] ED Dr. Manuel.  Her portable chest x-ray shows NG which appears to be in the appropriate position.  As she was being loaded for transfer lab reported critically low potassium 2.2.  We did hang 10 mEq of IV potassium prior to her transfer, and she received a subsequent dose of Dilaudid for pain as 10 mg of morphine had been not effective.  This was communicated to the receiving staff at the [facility].  Would recommend potential recheck, cardiac telemetry monitoring (no abnormality on telemetry here) and potentially obtaining EKG.  Despite her low potassium and ongoing pain, she is stable for transfer and needs to be transferred for more definitive surgical evaluation, likely will need to go to the OR for reduction and possible bowel resection.    I have reviewed the nursing notes. I have reviewed the findings, diagnosis, plan and need for follow up with the patient.    New Prescriptions    No medications on file        Final diagnoses:   Obstruction concurrent with and due to hernia of abdominal wall   I, Karmen Russell, am serving as a trained medical scribe to document services personally performed by Willis Varela MD, based on the provider's statements to me.     IWillis MD, was physically present and have reviewed and verified the accuracy of this note documented by Karmen Russell.     Willis Varela MD  [facility] EMERGENCY DEPARTMENT  5/4/2025     Willis Varela MD  05/06/25 1037

## 2025-05-05 ENCOUNTER — APPOINTMENT (OUTPATIENT)
Dept: PHYSICAL THERAPY | Facility: CLINIC | Age: 75
End: 2025-05-05
Attending: SURGERY
Payer: COMMERCIAL

## 2025-05-05 ENCOUNTER — TELEPHONE (OUTPATIENT)
Dept: PALLIATIVE MEDICINE | Facility: CLINIC | Age: 75
End: 2025-05-05
Payer: COMMERCIAL

## 2025-05-05 ENCOUNTER — APPOINTMENT (OUTPATIENT)
Dept: GENERAL RADIOLOGY | Facility: CLINIC | Age: 75
End: 2025-05-05
Payer: COMMERCIAL

## 2025-05-05 LAB
ANION GAP SERPL CALCULATED.3IONS-SCNC: 14 MMOL/L (ref 7–15)
BUN SERPL-MCNC: 20 MG/DL (ref 8–23)
CALCIUM SERPL-MCNC: 8.5 MG/DL (ref 8.8–10.4)
CHLORIDE SERPL-SCNC: 104 MMOL/L (ref 98–107)
CREAT SERPL-MCNC: 0.84 MG/DL (ref 0.51–0.95)
EGFRCR SERPLBLD CKD-EPI 2021: 73 ML/MIN/1.73M2
ERYTHROCYTE [DISTWIDTH] IN BLOOD BY AUTOMATED COUNT: 15.6 % (ref 10–15)
GLUCOSE BLDC GLUCOMTR-MCNC: 100 MG/DL (ref 70–99)
GLUCOSE SERPL-MCNC: 109 MG/DL (ref 70–99)
HCO3 SERPL-SCNC: 23 MMOL/L (ref 22–29)
HCT VFR BLD AUTO: 31.3 % (ref 35–47)
HGB BLD-MCNC: 10.1 G/DL (ref 11.7–15.7)
MAGNESIUM SERPL-MCNC: 2.1 MG/DL (ref 1.7–2.3)
MCH RBC QN AUTO: 28.9 PG (ref 26.5–33)
MCHC RBC AUTO-ENTMCNC: 32.3 G/DL (ref 31.5–36.5)
MCV RBC AUTO: 89 FL (ref 78–100)
PHOSPHATE SERPL-MCNC: 3 MG/DL (ref 2.5–4.5)
PLATELET # BLD AUTO: 203 10E3/UL (ref 150–450)
POTASSIUM SERPL-SCNC: 3.5 MMOL/L (ref 3.4–5.3)
RBC # BLD AUTO: 3.5 10E6/UL (ref 3.8–5.2)
SODIUM SERPL-SCNC: 141 MMOL/L (ref 135–145)
WBC # BLD AUTO: 11.5 10E3/UL (ref 4–11)

## 2025-05-05 PROCEDURE — 97161 PT EVAL LOW COMPLEX 20 MIN: CPT | Mod: GP | Performed by: PHYSICAL THERAPIST

## 2025-05-05 PROCEDURE — 97530 THERAPEUTIC ACTIVITIES: CPT | Mod: GP | Performed by: PHYSICAL THERAPIST

## 2025-05-05 PROCEDURE — 74018 RADEX ABDOMEN 1 VIEW: CPT | Mod: 26 | Performed by: RADIOLOGY

## 2025-05-05 PROCEDURE — 36415 COLL VENOUS BLD VENIPUNCTURE: CPT | Performed by: STUDENT IN AN ORGANIZED HEALTH CARE EDUCATION/TRAINING PROGRAM

## 2025-05-05 PROCEDURE — 250N000013 HC RX MED GY IP 250 OP 250 PS 637: Performed by: STUDENT IN AN ORGANIZED HEALTH CARE EDUCATION/TRAINING PROGRAM

## 2025-05-05 PROCEDURE — 258N000003 HC RX IP 258 OP 636: Performed by: STUDENT IN AN ORGANIZED HEALTH CARE EDUCATION/TRAINING PROGRAM

## 2025-05-05 PROCEDURE — 250N000011 HC RX IP 250 OP 636

## 2025-05-05 PROCEDURE — 999N000127 HC STATISTIC PERIPHERAL IV START W US GUIDANCE

## 2025-05-05 PROCEDURE — 85027 COMPLETE CBC AUTOMATED: CPT | Performed by: STUDENT IN AN ORGANIZED HEALTH CARE EDUCATION/TRAINING PROGRAM

## 2025-05-05 PROCEDURE — 83735 ASSAY OF MAGNESIUM: CPT | Performed by: STUDENT IN AN ORGANIZED HEALTH CARE EDUCATION/TRAINING PROGRAM

## 2025-05-05 PROCEDURE — 80048 BASIC METABOLIC PNL TOTAL CA: CPT | Performed by: STUDENT IN AN ORGANIZED HEALTH CARE EDUCATION/TRAINING PROGRAM

## 2025-05-05 PROCEDURE — 250N000011 HC RX IP 250 OP 636: Mod: JZ | Performed by: SURGERY

## 2025-05-05 PROCEDURE — 84100 ASSAY OF PHOSPHORUS: CPT | Performed by: STUDENT IN AN ORGANIZED HEALTH CARE EDUCATION/TRAINING PROGRAM

## 2025-05-05 PROCEDURE — 250N000013 HC RX MED GY IP 250 OP 250 PS 637

## 2025-05-05 PROCEDURE — 74018 RADEX ABDOMEN 1 VIEW: CPT

## 2025-05-05 PROCEDURE — 250N000011 HC RX IP 250 OP 636: Mod: JZ | Performed by: STUDENT IN AN ORGANIZED HEALTH CARE EDUCATION/TRAINING PROGRAM

## 2025-05-05 PROCEDURE — 120N000002 HC R&B MED SURG/OB UMMC

## 2025-05-05 RX ORDER — POTASSIUM CHLORIDE 7.45 MG/ML
10 INJECTION INTRAVENOUS
Status: COMPLETED | OUTPATIENT
Start: 2025-05-05 | End: 2025-05-06

## 2025-05-05 RX ORDER — HYDRALAZINE HYDROCHLORIDE 20 MG/ML
10-20 INJECTION INTRAMUSCULAR; INTRAVENOUS EVERY 30 MIN PRN
Status: DISPENSED | OUTPATIENT
Start: 2025-05-05

## 2025-05-05 RX ORDER — METHOCARBAMOL 100 MG/ML
500 INJECTION, SOLUTION INTRAMUSCULAR; INTRAVENOUS EVERY 8 HOURS PRN
Status: DISPENSED | OUTPATIENT
Start: 2025-05-05 | End: 2025-05-08

## 2025-05-05 RX ORDER — LEVOTHYROXINE SODIUM 112 UG/1
56 TABLET ORAL DAILY
Status: DISPENSED | OUTPATIENT
Start: 2025-05-05

## 2025-05-05 RX ORDER — CEFEPIME HYDROCHLORIDE 2 G/1
2 INJECTION, POWDER, FOR SOLUTION INTRAVENOUS EVERY 12 HOURS
Status: DISCONTINUED | OUTPATIENT
Start: 2025-05-05 | End: 2025-05-05

## 2025-05-05 RX ORDER — METRONIDAZOLE 500 MG/100ML
500 INJECTION, SOLUTION INTRAVENOUS EVERY 12 HOURS
Status: DISCONTINUED | OUTPATIENT
Start: 2025-05-05 | End: 2025-05-05

## 2025-05-05 RX ORDER — METRONIDAZOLE 500 MG/100ML
500 INJECTION, SOLUTION INTRAVENOUS EVERY 12 HOURS
Status: COMPLETED | OUTPATIENT
Start: 2025-05-05 | End: 2025-05-05

## 2025-05-05 RX ORDER — CEFEPIME HYDROCHLORIDE 2 G/1
2 INJECTION, POWDER, FOR SOLUTION INTRAVENOUS EVERY 12 HOURS
Status: COMPLETED | OUTPATIENT
Start: 2025-05-05 | End: 2025-05-05

## 2025-05-05 RX ORDER — POTASSIUM CHLORIDE 7.45 MG/ML
10 INJECTION INTRAVENOUS
Status: DISPENSED | OUTPATIENT
Start: 2025-05-05 | End: 2025-05-05

## 2025-05-05 RX ORDER — CARVEDILOL 3.12 MG/1
12.5 TABLET ORAL 2 TIMES DAILY WITH MEALS
Status: ACTIVE | OUTPATIENT
Start: 2025-05-05

## 2025-05-05 RX ORDER — LISINOPRIL 20 MG/1
20 TABLET ORAL 2 TIMES DAILY
Status: ACTIVE | OUTPATIENT
Start: 2025-05-05

## 2025-05-05 RX ORDER — METHOCARBAMOL 100 MG/ML
500 INJECTION, SOLUTION INTRAMUSCULAR; INTRAVENOUS EVERY 8 HOURS
Status: DISCONTINUED | OUTPATIENT
Start: 2025-05-05 | End: 2025-05-05

## 2025-05-05 RX ORDER — ACETAMINOPHEN 10 MG/ML
1000 INJECTION, SOLUTION INTRAVENOUS EVERY 8 HOURS
Status: COMPLETED | OUTPATIENT
Start: 2025-05-05 | End: 2025-05-07

## 2025-05-05 RX ORDER — LABETALOL HYDROCHLORIDE 5 MG/ML
10-20 INJECTION, SOLUTION INTRAVENOUS EVERY 10 MIN PRN
Status: DISPENSED | OUTPATIENT
Start: 2025-05-05

## 2025-05-05 RX ADMIN — HYDROMORPHONE HYDROCHLORIDE 0.4 MG: 0.2 INJECTION, SOLUTION INTRAMUSCULAR; INTRAVENOUS; SUBCUTANEOUS at 11:02

## 2025-05-05 RX ADMIN — CEFEPIME 2 G: 2 INJECTION, POWDER, FOR SOLUTION INTRAVENOUS at 03:50

## 2025-05-05 RX ADMIN — ONDANSETRON 4 MG: 4 TABLET, ORALLY DISINTEGRATING ORAL at 04:50

## 2025-05-05 RX ADMIN — ACETAMINOPHEN 1000 MG: 10 INJECTION INTRAVENOUS at 21:34

## 2025-05-05 RX ADMIN — ONDANSETRON 4 MG: 4 TABLET, ORALLY DISINTEGRATING ORAL at 21:46

## 2025-05-05 RX ADMIN — HYDROMORPHONE HYDROCHLORIDE 0.4 MG: 0.2 INJECTION, SOLUTION INTRAMUSCULAR; INTRAVENOUS; SUBCUTANEOUS at 08:55

## 2025-05-05 RX ADMIN — ENOXAPARIN SODIUM 40 MG: 40 INJECTION SUBCUTANEOUS at 08:56

## 2025-05-05 RX ADMIN — HYDROMORPHONE HYDROCHLORIDE 0.4 MG: 0.2 INJECTION, SOLUTION INTRAMUSCULAR; INTRAVENOUS; SUBCUTANEOUS at 03:50

## 2025-05-05 RX ADMIN — PANTOPRAZOLE SODIUM 40 MG: 40 INJECTION, POWDER, FOR SOLUTION INTRAVENOUS at 10:40

## 2025-05-05 RX ADMIN — POTASSIUM CHLORIDE 10 MEQ: 7.46 INJECTION, SOLUTION INTRAVENOUS at 23:05

## 2025-05-05 RX ADMIN — ACETAMINOPHEN 1000 MG: 10 INJECTION INTRAVENOUS at 14:08

## 2025-05-05 RX ADMIN — ACETAMINOPHEN 975 MG: 325 TABLET ORAL at 04:50

## 2025-05-05 RX ADMIN — ONDANSETRON 4 MG: 2 INJECTION, SOLUTION INTRAMUSCULAR; INTRAVENOUS at 08:55

## 2025-05-05 RX ADMIN — METRONIDAZOLE 500 MG: 500 INJECTION, SOLUTION INTRAVENOUS at 17:21

## 2025-05-05 RX ADMIN — CEFEPIME 2 G: 2 INJECTION, POWDER, FOR SOLUTION INTRAVENOUS at 15:35

## 2025-05-05 RX ADMIN — POTASSIUM CHLORIDE 10 MEQ: 7.46 INJECTION, SOLUTION INTRAVENOUS at 21:30

## 2025-05-05 RX ADMIN — HYDROMORPHONE HYDROCHLORIDE 0.4 MG: 0.2 INJECTION, SOLUTION INTRAMUSCULAR; INTRAVENOUS; SUBCUTANEOUS at 20:40

## 2025-05-05 RX ADMIN — OXYCODONE HYDROCHLORIDE 10 MG: 10 TABLET ORAL at 01:14

## 2025-05-05 RX ADMIN — HYDROMORPHONE HYDROCHLORIDE 0.4 MG: 0.2 INJECTION, SOLUTION INTRAMUSCULAR; INTRAVENOUS; SUBCUTANEOUS at 18:05

## 2025-05-05 RX ADMIN — METRONIDAZOLE 500 MG: 500 INJECTION, SOLUTION INTRAVENOUS at 04:51

## 2025-05-05 RX ADMIN — SODIUM CHLORIDE, SODIUM LACTATE, POTASSIUM CHLORIDE, AND CALCIUM CHLORIDE: .6; .31; .03; .02 INJECTION, SOLUTION INTRAVENOUS at 11:05

## 2025-05-05 RX ADMIN — METHOCARBAMOL 500 MG: 500 TABLET ORAL at 04:50

## 2025-05-05 RX ADMIN — HYDROMORPHONE HYDROCHLORIDE 0.4 MG: 0.2 INJECTION, SOLUTION INTRAMUSCULAR; INTRAVENOUS; SUBCUTANEOUS at 15:34

## 2025-05-05 RX ADMIN — ONDANSETRON 4 MG: 2 INJECTION, SOLUTION INTRAMUSCULAR; INTRAVENOUS at 15:35

## 2025-05-05 RX ADMIN — Medication 56 MCG: at 17:32

## 2025-05-05 ASSESSMENT — ACTIVITIES OF DAILY LIVING (ADL)
ADLS_ACUITY_SCORE: 55
ADLS_ACUITY_SCORE: 53
ADLS_ACUITY_SCORE: 55
ADLS_ACUITY_SCORE: 53
ADLS_ACUITY_SCORE: 55
ADLS_ACUITY_SCORE: 53
ADLS_ACUITY_SCORE: 53
DEPENDENT_IADLS:: CLEANING;LAUNDRY
ADLS_ACUITY_SCORE: 55
ADLS_ACUITY_SCORE: 53
ADLS_ACUITY_SCORE: 55
ADLS_ACUITY_SCORE: 53
ADLS_ACUITY_SCORE: 55
ADLS_ACUITY_SCORE: 53
ADLS_ACUITY_SCORE: 55
ADLS_ACUITY_SCORE: 53

## 2025-05-05 NOTE — MEDICATION SCRIBE - ADMISSION MEDICATION HISTORY
Pharmacist Admission Medication History    Admission medication history is complete. The information provided in this note is only as accurate as the sources available at the time of the update.    Information Source(s): Patient via in-person    Pertinent Information:   Patient was an excellent historian of their medications, confirmed all drug names, dosages, frequencies, and when each was last taken.  Patient denied the use of any other prescription or over the counter medications.  Patient reports repeatedly missing doses of carvedilol, lisinopril, and spironolactone d/t low blood pressures. Would like medications optimized while inpatient.   Patient currently has not started taking tumeric.     Changes made to PTA medication list:  Added: None  Deleted: ascorbic acid - not taking  Changed:   magnesium glycinate - pt taking 2 capsules  Spironolactone - pt taking 2 tablets    Allergies reviewed with patient and updates made in EHR: yes    Medication History Completed By: Anatoliy Garcia RPH 5/5/2025 11:39 AM    Current Facility-Administered Medications for the 5/4/25 encounter (Hospital Encounter)   Medication    romosozumab-aqqg (EVENITY) injection 210 mg     PTA Med List   Medication Sig Last Dose/Taking    acetaminophen (TYLENOL) 500 MG tablet Take 1,000 mg by mouth 3 times daily 5/3/2025 Evening    artificial tears OINT ophthalmic ointment Place into both eyes nightly as needed for dry eyes. Past Month    atorvastatin (LIPITOR) 80 MG tablet Take 1 tablet (80 mg) by mouth daily. 5/3/2025 Evening    carvedilol (COREG) 12.5 MG tablet Take 1 tablet (12.5 mg) by mouth 2 times daily (with meals). 5/3/2025    CRANBERRY EXTRACT PO Take 500 mg by mouth every morning 5/3/2025    diclofenac (VOLTAREN) 1 % topical gel Apply 2 g topically 4 times daily 5/3/2025 Morning    gabapentin (NEURONTIN) 300 MG capsule Take 4 capsules (1,200 mg) by mouth 3 times daily. 5/3/2025 Evening    HEMP OIL OR EXTRACT OR OTHER CBD  CANNABINOID, NOT MEDICAL CANNABIS, THC 5/3/2025 Evening    hydrochlorothiazide (HYDRODIURIL) 25 MG tablet Take 1 tablet (25 mg) by mouth daily. 5/3/2025 Morning    levothyroxine (SYNTHROID/LEVOTHROID) 112 MCG tablet TAKE 1/2 TAB BY MOUTH EVERY DAY 5/3/2025    lisinopril (ZESTRIL) 20 MG tablet Take 1 tablet (20 mg) by mouth 2 times daily. 5/3/2025 Evening    MAGNESIUM GLYCINATE PO Take 800 mg by mouth at bedtime. Taking    Melatonin 10 MG TABS tablet Take 10 mg by mouth nightly as needed. 5/3/2025 Bedtime    Multiple Vitamin (MULTI-VITAMIN) per tablet Take 1 tablet by mouth every morning 5/3/2025 Morning    omeprazole (PRILOSEC) 20 MG DR capsule Take 1 capsule (20 mg) by mouth daily. More than a month    ondansetron (ZOFRAN) 4 MG tablet Take 1 tablet (4 mg) by mouth every 6 hours as needed for nausea. Taking As Needed    ondansetron (ZOFRAN-ODT) 4 MG ODT tab Take 1 tablet (4 mg) by mouth every 12 hours as needed for nausea More than a month    oxyCODONE (ROXICODONE) 5 MG tablet Take 1 tablet (5 mg) by mouth 2 times daily as needed for severe pain. 5/4/2025 Morning    spironolactone (ALDACTONE) 25 MG tablet Take 1 tablet (25 mg) by mouth daily at 2 pm. (Patient taking differently: Take 50 mg by mouth daily at 2 pm.) 5/2/2025 Evening    tiZANidine (ZANAFLEX) 4 MG tablet Take 1 tablet (4 mg) by mouth at bedtime. 5/3/2025 Bedtime

## 2025-05-05 NOTE — PLAN OF CARE
Goal Outcome Evaluation:      Plan of Care Reviewed With: patient    Overall Patient Progress: no changeOverall Patient Progress: no change    Outcome Evaluation: arrived on 5A at 2200, settled, report given to night shift RN    /100, OVSS on RA. pt in pain, waiting for pain meds to be approved. Pt very friendly and chatty, repositioning self often and moaning due to chronic back pain. Incision and lap sites c/d/I, put on capno as ordered, passed onto night shift RN to get height and weight per pharmacy request

## 2025-05-05 NOTE — TELEPHONE ENCOUNTER
Left Voicemail (1st Attempt) and Sent Mychart (1st Attempt) for the patient to call back and reschedule the following:    Appointment type: Return pain  Provider(s): Renée Harris  Visit mode: In Person  Date: 5/6/25  Specialty phone number: [phone]    Additonal Notes: Patient is in-patient in hospital and will need to reschedule to a post-discharge date per Lyric Johns. rob

## 2025-05-05 NOTE — PLAN OF CARE
Problem: Adult Inpatient Plan of Care  Goal: Plan of Care Review  Description: The Plan of Care Review/Shift note should be completed every shift.  The Outcome Evaluation is a brief statement about your assessment that the patient is improving, declining, or no change.  This information will be displayed automatically on your shiftnote.  Flowsheets (Taken 5/5/2025 1557)  Outcome Evaluation: CMA completed with pt at bedside. Pt anticipates home discharge, aware home PT/OT is reccomended but unsure if she wants this service. SW/RNCC continue to follow.  Plan of Care Reviewed With: patient  Goal: Readiness for Transition of Care  Recent Flowsheet Documentation  Taken 5/5/2025 1500 by Fang Del Cid MSW  Transportation Anticipated: car, drives self  Concerns to be Addressed: discharge planning  Intervention: Mutually Develop Transition Plan  Recent Flowsheet Documentation  Taken 5/5/2025 1500 by Fang Del Cid MSW  Readmission Within the Last 30 Days: no previous admission in last 30 days  Transportation Anticipated: car, drives self  Transportation Concerns: none  Concerns to be Addressed: discharge planning  Patient/Family Anticipated Services at Transition: none  Patient/Family Anticipates Transition to:   home   home with help/services  Equipment Currently Used at Home: (4WW; right AFO, fully adjustable sleep number bed)   commode chair   shower chair   walker, rolling   other (see comments)

## 2025-05-05 NOTE — OP NOTE
[facility], [place]      Operative Note  Nadira Perez May 4, 2025      Pre-operative diagnosis: Incarcerated ventral hernia   Post-operative diagnosis: Ischemic small bowel   Procedure: Procedure(s):  LAPAROSCOPY, DIAGNOSTIC with lysis of adhesions, converted to open laparotomy with small bowel resection  x1     Surgeon: Surgeons and Role:     * Jacobo Cintron DO - Primary     * Yani Dominguez MD - Resident - Assisting              Anesthesia: General endotracheal anesthesia     Estimated blood loss: 50 cc       Blood transfusion: No transfusion was given during surgery.  Crystalloids 1100 cc   Total urine output: (See anesthesia record)  200ml   Drains:    Specimens: None    ID Type Source Tests Collected by Time Destination   1 : Small Bowel Tissue Small Intestine SURGICAL PATHOLOGY EXAM Jacobo Cintron DO 5/4/2025  5:49 PM              Findings   Tripathi's hernia with strangulated small bowel through hernia defect.  Resected 10 cm of small bowel segment with primary anastomosis and closed the hernia defect primarily (no mesh due to ischemic small bowel).         Complications: None   Condition: Patient taken to recovery in stable condition.       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  Imaging consistent with incarcerated ventral hernia.  Her vitals and labs were fairly unremarkable in the ED.  She was transferred to [facility] for further surgical intervention.     After a discussion of the risks, benefits, and alternatives, the patient elected to undergo surgery. Risks discussed included but not  limited to bleeding, infection, need for additional procedure, need for temporary ostomy or temporary closure, heart attack, stroke, blood clot. Formal written consent obtained.     Description:     After appropriate informed consent was obtained, the patient was brought to the operating room where a timeout was performed to identify the correct patient, procedure, and site.  Preoperative antibiotics were administered as part of the prophylaxis.  SCDs were placed for DVT prophylaxis.  The patient was also given chemical DVT prophylaxis in the preoperative holding area.  She was then induced under general endotracheal anesthesia.  An NG tube and Fong catheter were placed.  The patient was placed in a supine position with arms out to the sides.  The patient was prepped and draped in the usual sterile fashion.      Insufflation was established in the left upper quadrant at Mcfadden's point with a Veress needle and was done without difficulty (opening pressure 7 mmHg).  A separate incision was made for a 5 mm Optiview trocar insertion at this location under direct visualization.  There were no evidence of injury from entry there were omental adhesions against the abdominal wall close to the hernia defect in the right lower quadrant we also noticed a segment of small bowel herniating through this defect.  We started taking down these omental adhesions using a combination of blunt dissection and and using a LigaSure energy device.  This was done without any difficulty.  We then took down part of the hernia sac with the help of blunt dissection and LigaSure.  We try to reduce the small bowel laparoscopically but were unsuccessful.      So at this time we made a made an incision right above the hernia in a transverse fashion.  We went down the subcutaneous tissue with the help of electrocautery.  We dissected around the hernia defect until and cleared off the fascial edges with the use of electrocautery.  We then opened up  the hernia sac and there was murky fluid that was expressed and the bowel was ischemic and necrotic.  We reduced the omentum back into the abdomen and ran the rest of the small bowel both proximally and distally and was appeared to be viable.     Next, we proceeded with resection of the small bowel.  The proximal transection site and the distal transection site were chosen.  We then proceeded to divide the mesentery using LigaSure energy device.  The 2 limbs of the small bowel were then lined up in a side-to-side configuration along the antimesenteric side.  Enterotomies were made on each piece of the bowel and a blue load 75 mm Endo CIPRIANO stapler device was placed and fired to create enteroenterostomy.  Additional succus was evacuated with suction and the internal staple line appeared to be hemostatic.  The common enterotomy defect was closed using another blue load 75 mm Endo CIPRIANO stapler.  A simple crotch stitch was placed with a 3-0 silk.  The staple line appeared to be bleeding so we reinforced with a few 3-0 silk sutures in a Lembert fashion.  At the end the staple line was well-perfused and hemostatic.  The mesentery defect was closed with a 2-0 Vicryl suture in a running fashion.  The anastomosis and the small bowel were reduced back into the abdomen.      We then went back laparoscopically to explore in the abdomen.  The anastomosis appeared to be hemostatic.  There was no evidence of any bleeding and we ensured hemostasis and the omentum was returned to its anatomic location lying over the small bowel and our anastomosis.    We then changed our gloves and switched to clean instruments. Fascia was closed with  2-0 PDS suture in a running fashion. The wound was irrigated again with saline and skin closed with staples. Island dressing was placed.  The port sites were closed with a 4-0 Monocryl and Dermabond glue was applied.    Patient tolerated procedure without complication. Counts were correct x2.  She  underwent quadratus lumborum (QL) block by anesthesia using half percent bupivacaine.  The patient was extubated and there were no immediate complications.  Patient was transferred to PACU in a stable condition.    Dr. Les Jara was scrubbed for the entire procedure.    Yani Dominguez MD  General Surgery PGY-7  [phone]

## 2025-05-05 NOTE — PROVIDER NOTIFICATION
MD Gallo Morgan made aware of increased drainage of dressing, inability to focus on one thought, and restlessness. MD stated they visited pt and no pause for concern. No changes at this time.

## 2025-05-05 NOTE — PLAN OF CARE
Goal Outcome Evaluation:      Plan of Care Reviewed With: patient          Outcome Evaluation: [id number]    Significant 24 hour events:   K 2.2    Level of Care: Acute    Summary Type: 5A Post Op Report   POD: #1 = 5/5  LAPAROSCOPY, DIAGNOSTIC with lysis of adhesions, converted to open laparotomy with small bowel resection  x1, N/A - Abdomen   Complications:    Pain:  Uncotrolled Pain given Oxy and Dilaudid    Neuro:WDL  CV:WDL  Resp:WDL  GI: WDL except nausea and dry heaving  Zofran to help manage   :.WDL except - Fong   Skin:  WDL except surgical site UTV ans 2 lap site  CDI and CONNIE  Activity Assistance: assistance, 1 person   Safety/Falls Risk: Level of Risk: High Risk  Consults: PT/OT  Procedures/Imaging:   Precautions: Isolation Precautions  Note: Pt hypertensive but otherwise VSS and on RA. MD made aware of BP. Pain uncontrolled pt report lower back pain increasing. NG tube in place with output. Fong in place and has good output. Pt was unable to sleep all night. Restless and increase anxiety noted MD made aware no changes at this time. NPO except ice chip and meds. Will continue to follow POC.

## 2025-05-05 NOTE — PROGRESS NOTES
"   05/05/25 1020   Appointment Info   Signing Clinician's Name / Credentials (PT) Gisele Merino, PT   Rehab Comments (PT) abd precautions   Living Environment   People in Home alone   Current Living Arrangements house  (townhouse)   Home Accessibility stairs to enter home;stairs within home   Number of Stairs, Main Entrance 1   Stair Railings, Main Entrance none   Number of Stairs, Within Home, Primary greater than 10 stairs  (13 total)   Stair Railings, Within Home, Primary railing on right side (ascending)   Transportation Anticipated car, drives self;family or friend will provide   Living Environment Comments Pt states that she lives alone in a townhouse; does have lower level, but all needs can be met on main living level   Self-Care   Usual Activity Tolerance moderate   Current Activity Tolerance fair   Regular Exercise Yes   Activity/Exercise Type other (see comments)  (attends [employer] 3x/wk)   Exercise Amount/Frequency 3-5 times/wk   Equipment Currently Used at Home commode chair;shower chair;walker, rolling;other (see comments)  (4WW; right AFO, fully adjustable sleep number bed)   Fall history within last six months yes   Number of times patient has fallen within last six months 4   Activity/Exercise/Self-Care Comment Pt independent with mobility & ADLs prior to hospitalization; was using right AFO, WW or SEC for mobility; has shower stall with shower chair for bathing; she states that she has family close by for assistance as needed   General Information   Onset of Illness/Injury or Date of Surgery 05/04/25   Referring Physician Yani Dominguez MD   Patient/Family Therapy Goals Statement (PT) Return home   Pertinent History of Current Problem (include personal factors and/or comorbidities that impact the POC) Per chart review, \"74 year old female with PMHx of HTN, stage IIIc inflammatory breast cancer s/p bilateral mastectomy, T2DM, peripheral neuropathy, DDD s/p spinal fusion L5-S1 who presented to the ED " "on 5/4 with abdominal pain, nausea, and retching. Imaging demonstrated incarcerated ventral hernia, prompting EGS involvement. On 5/4, patient underwent diagnostic laparoscopy converted to laparotomy with lysis of adhesions, small bowel resection, and primary closure of ventral hernia (no mesh).\"   Existing Precautions/Restrictions abdominal;fall   General Observations Activity: up with assist   Cognition   Affect/Mental Status (Cognition) WNL   Orientation Status (Cognition) oriented x 4   Follows Commands (Cognition) WNL   Pain Assessment   Patient Currently in Pain Yes, see Vital Sign flowsheet   Integumentary/Edema   Integumentary/Edema Comments abdominal incisions; large area of ecchymosis, left flank   Posture    Posture Forward head position;Protracted shoulders;Kyphosis   Posture Comments Pt with significant back pain (working with OP pain service for mgmt) & adopts posture of forward flexion to reduce pain   Range of Motion (ROM)   ROM Comment bilateral L/Es WFL for mobility needs   Strength (Manual Muscle Testing)   Strength Comments bilateral L/Es not formally assessed; exhibits right foot drop   Bed Mobility   Comment, (Bed Mobility) Transitions from semi-fowlers position to sitting EOB with verbal instruction & physical assist to move thru right sidelying due to abd precautions   Transfers   Comment, (Transfers) SBA for sit to stand from EOB, recliner with use of 4WW   Gait/Stairs (Locomotion)   Comment, (Gait/Stairs) CGA to amb with 4WW on level 200 ft; exhibits excessively forward flexed posture to manage LBP; steppage pattern on right (AFO not here), decreased step length bilaterally with decrased gait gurinder   Balance   Balance Comments sitting - good at EOB without U/E support; standing - good/fair, needs at least single U/E support at this time   Clinical Impression   Criteria for Skilled Therapeutic Intervention Yes, treatment indicated   PT Diagnosis (PT) impaired functional mobility "   Influenced by the following impairments pain, abd precautions, decreased activity tolerance   Functional limitations due to impairments decreased independence with bed mobility, transfers & gait   Clinical Presentation (PT Evaluation Complexity) stable   Clinical Presentation Rationale based upon subjective information provided, objective exam findings, medical history & clinical judgement   Clinical Decision Making (Complexity) low complexity   Planned Therapy Interventions (PT) bed mobility training;gait training;patient/family education;stair training;transfer training   Risk & Benefits of therapy have been explained evaluation/treatment results reviewed;care plan/treatment goals reviewed;participants voiced agreement with care plan;participants included;patient   PT Total Evaluation Time   PT Belinda, Low Complexity Minutes (19292) 10   Physical Therapy Goals   PT Frequency 6x/week   PT Predicted Duration/Target Date for Goal Attainment 05/16/25   PT Goals Bed Mobility;Transfers;Gait;Stairs   PT: Bed Mobility Modified independent;Supine to/from sit;Rolling;Bridging;Within precautions   PT: Transfers Modified independent;Sit to/from stand;Bed to/from chair;Assistive device   PT: Gait Modified independent;Rolling walker;Greater than 200 feet   PT: Stairs Modified independent;1 stair   PT Discharge Planning   PT Discharge Recommendation (DC Rec) home with assist;home with home care physical therapy   PT Rationale for DC Rec Pt presents with mobility limitations related to abd precautions & significant low back pain; moving fairly well with use of 4WW and anticipate that pt will progress to mod independence with mobility skills for discharge to home; recommend home PT to progress strength, endurance & assist with back pain management   PT Brief overview of current status SBA for transfers; CGA to amb with WW on level   PT Total Distance Amb During Session (feet) 200   Physical Therapy Time and Intention   Total  Session Time (sum of timed and untimed services) 10

## 2025-05-05 NOTE — PROGRESS NOTES
Emergency General Surgery Progress Note  5/6/2025      Subjective:   Patient doing well this am abdominal pain improved significantly continue to endorse back pain. NGT placed to LIS. NG output about 900cc though she has had a couple cups of ice chips. Denies n/v. Passing gas and had bm. Abdominal pain controlled with current medications but requests a pain management consult for back pain.     Objective:   Temp:  [96.8  F (36  C)-100.3  F (37.9  C)] 100.3  F (37.9  C)  Pulse:  [] 86  Resp:  [12-23] 20  BP: (130-178)/() 130/105  SpO2:  [92 %-99 %] 92 %  I/O last 3 completed shifts:  In: 1790 [I.V.:1500; NG/GT:90; IV Piggyback:200]  Out: 1600 [Urine:950; Emesis/NG output:600; Blood:50]      Gen: A&O x3, in NAD. NGT in place  CV: RRR. Pulses 2+  Pulm: Nonlabored breathing on RA  Abd: Soft, appropriately tender, mildly distended. Midline abdominal incision covered with abdominal pad, removed, and gauze, with marker showing minimal strikethrough (no new strikethrough compared to marker). Incision CDI with staplers.   : Fong out  Extremities: warm and well perfused, moves all extremities spontaneously. No peripheral edema  Neuro: CN II-XII grossly intact, no focal deficits observed       Labs:  BMP   Recent Labs   Lab 05/05/25  0623 05/05/25  0533 05/04/25  2224 05/04/25  1917 05/04/25  1631 05/04/25  1455 05/04/25  1212   NA  --  141  --   --  144  --  143   POTASSIUM  --  3.5  --   --  3.1*  --  2.2*   CHLORIDE  --  104  --   --   --   --  114*   RAMBO  --  8.5*  --   --   --   --  6.3*   CO2  --  23  --   --   --   --  18*   BUN  --  20.0  --   --   --   --  18.1   CR  --  0.84 0.84  --   --   --  0.60   * 109*  --  117* 97   < > 98   MAG  --  2.1  --   --   --   --  1.4*   PHOS  --  3.0  --   --   --   --   --     < > = values in this interval not displayed.     CBC   Recent Labs   Lab 05/05/25  0533 05/04/25  1631 05/04/25  1212   WBC 11.5*  --  6.8   RBC 3.50*  --  3.23*   HGB 10.1* 11.4* 9.6*    MCV 89  --  90   MCH 28.9  --  29.7   MCHC 32.3  --  33.2   RDW 15.6*  --  15.3*     --  221         Imaging:   Received 5/4 CT Abdomen & Pelvis    IMPRESSION:   1.  Previously fat-containing right abdominal wall hernia now contains  a small bowel loop which is borderline dilated and is decompressed as  it reenters the abdominal cavity, suggesting possible obstruction at  this site. There is normal enhancement of the bowel wall with mild  surrounding inflammatory changes and free fluid in the hernia sac.  Upstream small bowel is borderline dilated measuring up to 3cm in  diameter.  2.  Multiple hyperdense lesions within the bilateral kidneys, may  represent proteinaceous/hemorrhagic cysts. Recommend nonemergent  dedicated MRI renal mass protocol.  3.   Hyperdense left adrenal lesion measuring up to 3 cm. This also  can be evaluated on MRI abdomen.      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 Imaging demonstrated incarcerated ventral hernia, prompting EGS involvement. On 5/4, patient underwent diagnostic laparoscopy converted to laparotomy with lysis of adhesions, small bowel resection, and primary closure of ventral hernia (no mesh).    Patient is post-operative day #1, with NGT in place to LIS. Will maintain NGT in place, minimize PO medications and avoid clamping NGT. Discussed with pharmacy and will plan for 48 hours of IV Tylenol for pain control.       Today's Changes (5/5/2025):   - NPO, LR @ 100 mL/hr   - Maintain NGT to LIS, avoid clamping NGT   - Minimize medications via NGT given significant nausea with clamping of NGT   - Flush NGT qshift: flush blue port with air only and then flush clear port with 30cc of saline qshift  - IV Tylenol x48 hours, IV dilaudid and IV  Robaxin PRN for pain control. Holding PO oxycodone. Discussed IV Tylenol with pharmacy.  - Will transition oral medications to IV medications where able  - Oral Levothyroxine for hypothyroidism  - IV PPI ordered given PTA PPI   - Holding PTA anti-hypertensive medications, will have IV labetalol and hydralazine available PRN for elevated SBP >160.   - IS at bedside, encourage IS  - OOB, ambulate       This patient was seen and discussed with chief resident who discussed with staff    Edward Brewer MD  Urology PGY-1  General surgery service

## 2025-05-05 NOTE — ANESTHESIA CARE TRANSFER NOTE
Patient: Nadira Perez    Procedure: Procedure(s):  LAPAROSCOPY, DIAGNOSTIC with lysis of adhesions, converted to open laparotomy with small bowel resection  x1       Diagnosis: Obstruction concurrent with and due to hernia of abdominal wall [K43.6]  Diagnosis Additional Information: No value filed.    Anesthesia Type:   General     Note:    Oropharynx: spontaneously breathing and oropharynx clear of all foreign objects  Level of Consciousness: awake  Oxygen Supplementation: face mask  Level of Supplemental Oxygen (L/min / FiO2): 6  Independent Airway: airway patency satisfactory and stable  Dentition: dentition unchanged  Vital Signs Stable: post-procedure vital signs reviewed and stable  Report to RN Given: handoff report given  Patient transferred to: PACU    Handoff Report: Identifed the Patient, Identified the Reponsible Provider, Reviewed the pertinent medical history, Discussed the surgical course, Reviewed Intra-OP anesthesia mangement and issues during anesthesia, Set expectations for post-procedure period and Allowed opportunity for questions and acknowledgement of understanding    Vitals:  Vitals Value Taken Time   /111 05/04/25 1910   Temp 36  C (96.8  F) 05/04/25 1910   Pulse 92 05/04/25 1910   Resp 20 05/04/25 1910   SpO2 98 % 05/04/25 1910       Electronically Signed By: MERCEDES Campos CRNA  May 4, 2025  7:19 PM

## 2025-05-05 NOTE — TELEPHONE ENCOUNTER
[facility] Call Center    Phone Message    May a detailed message be left on voicemail: yes     Reason for Call: Other: Patient called and is currently an in patient for a different reason but she really wants to keep her appointment with Renée Harris on 5/6/2025. She is wondering if the provider can do a virtual or telephone visit with her on 5/6/2025. She says she can't wait any longer she just needs to meet with the provider. Patient said it is to hard to get on her MyChart right know so if she can call her. Please review and call patient back.     Action Taken: Message routed to:  [facility]     Travel Screening: Not Applicable     Date of Service:

## 2025-05-05 NOTE — CONSULTS
Care Management Initial Consult    General Information  Assessment completed with: Patient,    Type of CM/SW Visit: Initial Assessment    Primary Care Provider verified and updated as needed: Yes   Readmission within the last 30 days: no previous admission in last 30 days      Reason for Consult: discharge planning  Advance Care Planning: Advance Care Planning Reviewed: concerns discussed          Communication Assessment  Patient's communication style: spoken language (English or Bilingual)             Cognitive  Cognitive/Neuro/Behavioral: WDL  Level of Consciousness: alert  Arousal Level: opens eyes spontaneously  Orientation: oriented x 4  Mood/Behavior: excitable, anxious, agitated, restless     Speech: clear, spontaneous, logical    Living Environment:   People in home: alone     Current living Arrangements: house      Able to return to prior arrangements: yes       Family/Social Support:  Care provided by: self, other (see comments) (roomate used to help with iADL's)  Provides care for: no one  Marital Status:   Support system: Children, Neighbor, Friend          Description of Support System: Involved, Supportive    Support Assessment: Adequate family and caregiver support, Adequate social supports    Current Resources:   Patient receiving home care services: No        Community Resources: None  Equipment currently used at home: commode chair, shower chair, walker, rolling, other (see comments) (4WW; right AFO, fully adjustable sleep number bed)  Supplies currently used at home: None    Employment/Financial:  Employment Status: retired        Financial Concerns: none           Does the patient's insurance plan have a 3 day qualifying hospital stay waiver?  Yes     Which insurance plan 3 day waiver is available? Alternative insurance waiver    Will the waiver be used for post-acute placement? Undetermined at this time    Lifestyle & Psychosocial Needs:  Social Drivers of Health     Food Insecurity:  Low Risk  (3/18/2025)    Food Insecurity     Within the past 12 months, did you worry that your food would run out before you got money to buy more?: No     Within the past 12 months, did the food you bought just not last and you didn t have money to get more?: No   Depression: Not at risk (3/18/2025)    PHQ-2     PHQ-2 Score: 1   Recent Concern: Depression - At risk (3/4/2025)    PHQ-2     PHQ-2 Score: 3   Housing Stability: Low Risk  (3/18/2025)    Housing Stability     Do you have housing? : Yes     Are you worried about losing your housing?: No   Tobacco Use: Low Risk  (3/18/2025)    Patient History     Smoking Tobacco Use: Never     Smokeless Tobacco Use: Never     Passive Exposure: Never   Financial Resource Strain: Low Risk  (3/18/2025)    Financial Resource Strain     Within the past 12 months, have you or your family members you live with been unable to get utilities (heat, electricity) when it was really needed?: No   Alcohol Use: Not on file   Transportation Needs: Low Risk  (3/18/2025)    Transportation Needs     Within the past 12 months, has lack of transportation kept you from medical appointments, getting your medicines, non-medical meetings or appointments, work, or from getting things that you need?: No   Physical Activity: Not on file   Interpersonal Safety: Low Risk  (5/4/2025)    Interpersonal Safety     Do you feel physically and emotionally safe where you currently live?: Yes     Within the past 12 months, have you been hit, slapped, kicked or otherwise physically hurt by someone?: No     Within the past 12 months, have you been humiliated or emotionally abused in other ways by your partner or ex-partner?: No   Stress: Not on file   Social Connections: Not on file   Health Literacy: Not on file       Functional Status:  Prior to admission patient needed assistance:   Dependent ADLs:: Independent  Dependent IADLs:: Cleaning, Laundry       Mental Health Status:  Mental Health Status: No Current  Concerns       Chemical Dependency Status:  Chemical Dependency Status: No Current Concerns             Values/Beliefs:  Spiritual, Cultural Beliefs, Confucianist Practices, Values that affect care: no               Discussed  Partnership in Safe Discharge Planning  document with patient/family: No    Additional Information:  SW consulted for high readmission risk score. SW met with pt at bedside to complete CMA. SW introduced self, role, and purpose of meeting. SW verified pts address, contacts, PCP, and insurance. Pt is interested in completing an health care directive. SW brought HCD and left it with pt at bedside.     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 [place]. Pt reports high family support. Her older brother lives nearby and is available to help pt out financially if needed. He is also taking care of pts dog while she is hospitalized. Pts younger brother [place] but is also supportive and offered pt a place to live if needed.     Pt used to have a roommate that helped with cleaning, laundry, and heavier household tasks, but roommate no longer lives there. Pt states her home has gotten cluttered since, but has her three sisters in law coming over after discharge to help de clutter and get caught up on household chores. Pt is independent with ADL's.    Pt reports her biggest concern is her back pain and pain management after discharge. Pt plans to ask primary team about this.     SW discussed recommendation home home PT/OT with pt. Pt is not sure if she needs this at this time. Pt reported she had high pain today but is typically more independent and anticipates being able to manage at home if her pain is controlled. Pt also mentioned she may stay at her brothers after discharge. SW declined OT today but now wants to see them, plans to talk with PT about seeing OT tomorrow.     Next Steps:   -Follow for home discharge,  possible home PT/OT.     JASMYN Durán   [facility]  Available via [employer]  Covering 5A/C SW  Ph: [phone]

## 2025-05-05 NOTE — ANESTHESIA POSTPROCEDURE EVALUATION
Patient: Nadira Perez    Procedure: Procedure(s):  LAPAROSCOPY, DIAGNOSTIC with lysis of adhesions, converted to open laparotomy with small bowel resection  x1       Anesthesia Type:  General    Note:  Disposition: Inpatient   Postop Pain Control: Challenging            Challenges/Interventions: Exacerbation of chronic pain (Chronic back pain)            Sign Out: Pain at Preoperative BASELINE   PONV: No   Neuro/Psych: Uneventful            Sign Out: Acceptable/Baseline neuro status   Airway/Respiratory: Uneventful            Sign Out: Acceptable/Baseline resp. status   CV/Hemodynamics: Uneventful            Sign Out: Acceptable CV status; No obvious hypovolemia; No obvious fluid overload   Other NRE: NONE   DID A NON-ROUTINE EVENT OCCUR?            Last vitals:  Vitals Value Taken Time   /96 05/04/25 2000   Temp     Pulse 88 05/04/25 2013   Resp 16 05/04/25 2013   SpO2 95 % 05/04/25 2013   Vitals shown include unfiled device data.    Electronically Signed By: Sagrario Ramesh MD  May 4, 2025  8:13 PM

## 2025-05-06 ENCOUNTER — APPOINTMENT (OUTPATIENT)
Dept: PHYSICAL THERAPY | Facility: CLINIC | Age: 75
End: 2025-05-06
Payer: COMMERCIAL

## 2025-05-06 LAB
ANION GAP SERPL CALCULATED.3IONS-SCNC: 14 MMOL/L (ref 7–15)
BUN SERPL-MCNC: 20.6 MG/DL (ref 8–23)
CALCIUM SERPL-MCNC: 8.5 MG/DL (ref 8.8–10.4)
CHLORIDE SERPL-SCNC: 103 MMOL/L (ref 98–107)
CREAT SERPL-MCNC: 0.81 MG/DL (ref 0.51–0.95)
EGFRCR SERPLBLD CKD-EPI 2021: 77 ML/MIN/1.73M2
ERYTHROCYTE [DISTWIDTH] IN BLOOD BY AUTOMATED COUNT: 15.7 % (ref 10–15)
GLUCOSE BLDC GLUCOMTR-MCNC: 92 MG/DL (ref 70–99)
GLUCOSE SERPL-MCNC: 95 MG/DL (ref 70–99)
HCO3 SERPL-SCNC: 22 MMOL/L (ref 22–29)
HCT VFR BLD AUTO: 28.1 % (ref 35–47)
HGB BLD-MCNC: 9.2 G/DL (ref 11.7–15.7)
MAGNESIUM SERPL-MCNC: 2.1 MG/DL (ref 1.7–2.3)
MCH RBC QN AUTO: 28.9 PG (ref 26.5–33)
MCHC RBC AUTO-ENTMCNC: 32.7 G/DL (ref 31.5–36.5)
MCV RBC AUTO: 88 FL (ref 78–100)
PHOSPHATE SERPL-MCNC: 1.9 MG/DL (ref 2.5–4.5)
PHOSPHATE SERPL-MCNC: 2 MG/DL (ref 2.5–4.5)
PLATELET # BLD AUTO: 250 10E3/UL (ref 150–450)
POTASSIUM SERPL-SCNC: 3.4 MMOL/L (ref 3.4–5.3)
POTASSIUM SERPL-SCNC: 3.7 MMOL/L (ref 3.4–5.3)
RBC # BLD AUTO: 3.18 10E6/UL (ref 3.8–5.2)
SODIUM SERPL-SCNC: 139 MMOL/L (ref 135–145)
WBC # BLD AUTO: 11.4 10E3/UL (ref 4–11)

## 2025-05-06 PROCEDURE — 99222 1ST HOSP IP/OBS MODERATE 55: CPT | Mod: GC

## 2025-05-06 PROCEDURE — 84132 ASSAY OF SERUM POTASSIUM: CPT | Performed by: SURGERY

## 2025-05-06 PROCEDURE — 999N000111 HC STATISTIC OT IP EVAL DEFER: Performed by: OCCUPATIONAL THERAPIST

## 2025-05-06 PROCEDURE — 250N000009 HC RX 250: Performed by: SURGERY

## 2025-05-06 PROCEDURE — 999N000128 HC STATISTIC PERIPHERAL IV START W/O US GUIDANCE

## 2025-05-06 PROCEDURE — 85014 HEMATOCRIT: CPT | Performed by: STUDENT IN AN ORGANIZED HEALTH CARE EDUCATION/TRAINING PROGRAM

## 2025-05-06 PROCEDURE — 36415 COLL VENOUS BLD VENIPUNCTURE: CPT | Performed by: SURGERY

## 2025-05-06 PROCEDURE — 258N000003 HC RX IP 258 OP 636: Performed by: STUDENT IN AN ORGANIZED HEALTH CARE EDUCATION/TRAINING PROGRAM

## 2025-05-06 PROCEDURE — 250N000011 HC RX IP 250 OP 636

## 2025-05-06 PROCEDURE — 84100 ASSAY OF PHOSPHORUS: CPT | Performed by: STUDENT IN AN ORGANIZED HEALTH CARE EDUCATION/TRAINING PROGRAM

## 2025-05-06 PROCEDURE — 258N000003 HC RX IP 258 OP 636: Performed by: SURGERY

## 2025-05-06 PROCEDURE — 84100 ASSAY OF PHOSPHORUS: CPT | Performed by: SURGERY

## 2025-05-06 PROCEDURE — 97530 THERAPEUTIC ACTIVITIES: CPT | Mod: GP | Performed by: PHYSICAL THERAPIST

## 2025-05-06 PROCEDURE — 250N000011 HC RX IP 250 OP 636: Performed by: SURGERY

## 2025-05-06 PROCEDURE — 36415 COLL VENOUS BLD VENIPUNCTURE: CPT | Performed by: STUDENT IN AN ORGANIZED HEALTH CARE EDUCATION/TRAINING PROGRAM

## 2025-05-06 PROCEDURE — 250N000013 HC RX MED GY IP 250 OP 250 PS 637: Performed by: STUDENT IN AN ORGANIZED HEALTH CARE EDUCATION/TRAINING PROGRAM

## 2025-05-06 PROCEDURE — 120N000002 HC R&B MED SURG/OB UMMC

## 2025-05-06 PROCEDURE — 83735 ASSAY OF MAGNESIUM: CPT | Performed by: STUDENT IN AN ORGANIZED HEALTH CARE EDUCATION/TRAINING PROGRAM

## 2025-05-06 PROCEDURE — 80048 BASIC METABOLIC PNL TOTAL CA: CPT | Performed by: STUDENT IN AN ORGANIZED HEALTH CARE EDUCATION/TRAINING PROGRAM

## 2025-05-06 PROCEDURE — 250N000011 HC RX IP 250 OP 636: Performed by: STUDENT IN AN ORGANIZED HEALTH CARE EDUCATION/TRAINING PROGRAM

## 2025-05-06 RX ORDER — POTASSIUM PHOS IN 0.9 % NACL 15MMOL/250
15 PLASTIC BAG, INJECTION (ML) INTRAVENOUS ONCE
Status: COMPLETED | OUTPATIENT
Start: 2025-05-06 | End: 2025-05-06

## 2025-05-06 RX ORDER — POTASSIUM CHLORIDE 7.45 MG/ML
10 INJECTION INTRAVENOUS
Status: COMPLETED | OUTPATIENT
Start: 2025-05-06 | End: 2025-05-06

## 2025-05-06 RX ADMIN — POTASSIUM CHLORIDE 10 MEQ: 7.46 INJECTION, SOLUTION INTRAVENOUS at 08:48

## 2025-05-06 RX ADMIN — ACETAMINOPHEN 1000 MG: 10 INJECTION INTRAVENOUS at 06:03

## 2025-05-06 RX ADMIN — ONDANSETRON 4 MG: 4 TABLET, ORALLY DISINTEGRATING ORAL at 03:54

## 2025-05-06 RX ADMIN — ONDANSETRON 4 MG: 2 INJECTION, SOLUTION INTRAMUSCULAR; INTRAVENOUS at 22:02

## 2025-05-06 RX ADMIN — HYDROMORPHONE HYDROCHLORIDE 0.2 MG: 0.2 INJECTION, SOLUTION INTRAMUSCULAR; INTRAVENOUS; SUBCUTANEOUS at 16:54

## 2025-05-06 RX ADMIN — HYDROMORPHONE HYDROCHLORIDE 0.4 MG: 0.2 INJECTION, SOLUTION INTRAMUSCULAR; INTRAVENOUS; SUBCUTANEOUS at 08:27

## 2025-05-06 RX ADMIN — METHOCARBAMOL 500 MG: 100 INJECTION, SOLUTION INTRAMUSCULAR; INTRAVENOUS at 12:20

## 2025-05-06 RX ADMIN — POTASSIUM PHOSPHATE, MONOBASIC POTASSIUM PHOSPHATE, DIBASIC 15 MMOL: 224; 236 INJECTION, SOLUTION, CONCENTRATE INTRAVENOUS at 14:31

## 2025-05-06 RX ADMIN — POTASSIUM CHLORIDE 10 MEQ: 7.46 INJECTION, SOLUTION INTRAVENOUS at 10:13

## 2025-05-06 RX ADMIN — PROCHLORPERAZINE EDISYLATE 5 MG: 5 INJECTION INTRAMUSCULAR; INTRAVENOUS at 08:40

## 2025-05-06 RX ADMIN — HYDROMORPHONE HYDROCHLORIDE 0.2 MG: 0.2 INJECTION, SOLUTION INTRAMUSCULAR; INTRAVENOUS; SUBCUTANEOUS at 18:54

## 2025-05-06 RX ADMIN — HYDROMORPHONE HYDROCHLORIDE 0.4 MG: 0.2 INJECTION, SOLUTION INTRAMUSCULAR; INTRAVENOUS; SUBCUTANEOUS at 23:21

## 2025-05-06 RX ADMIN — ONDANSETRON 4 MG: 4 TABLET, ORALLY DISINTEGRATING ORAL at 14:02

## 2025-05-06 RX ADMIN — ENOXAPARIN SODIUM 40 MG: 40 INJECTION SUBCUTANEOUS at 08:49

## 2025-05-06 RX ADMIN — POTASSIUM CHLORIDE 10 MEQ: 7.46 INJECTION, SOLUTION INTRAVENOUS at 11:25

## 2025-05-06 RX ADMIN — SODIUM CHLORIDE, SODIUM LACTATE, POTASSIUM CHLORIDE, AND CALCIUM CHLORIDE: .6; .31; .03; .02 INJECTION, SOLUTION INTRAVENOUS at 19:01

## 2025-05-06 RX ADMIN — HYDROMORPHONE HYDROCHLORIDE 0.2 MG: 0.2 INJECTION, SOLUTION INTRAMUSCULAR; INTRAVENOUS; SUBCUTANEOUS at 20:59

## 2025-05-06 RX ADMIN — ACETAMINOPHEN 1000 MG: 10 INJECTION INTRAVENOUS at 13:57

## 2025-05-06 RX ADMIN — HYDROMORPHONE HYDROCHLORIDE 0.4 MG: 0.2 INJECTION, SOLUTION INTRAMUSCULAR; INTRAVENOUS; SUBCUTANEOUS at 13:58

## 2025-05-06 RX ADMIN — PANTOPRAZOLE SODIUM 40 MG: 40 INJECTION, POWDER, FOR SOLUTION INTRAVENOUS at 08:49

## 2025-05-06 RX ADMIN — HYDROMORPHONE HYDROCHLORIDE 0.4 MG: 0.2 INJECTION, SOLUTION INTRAMUSCULAR; INTRAVENOUS; SUBCUTANEOUS at 03:55

## 2025-05-06 RX ADMIN — Medication 5 MG: at 01:12

## 2025-05-06 RX ADMIN — HYDROMORPHONE HYDROCHLORIDE 0.4 MG: 0.2 INJECTION, SOLUTION INTRAMUSCULAR; INTRAVENOUS; SUBCUTANEOUS at 01:12

## 2025-05-06 RX ADMIN — POTASSIUM CHLORIDE 10 MEQ: 7.46 INJECTION, SOLUTION INTRAVENOUS at 12:33

## 2025-05-06 RX ADMIN — ACETAMINOPHEN 1000 MG: 10 INJECTION INTRAVENOUS at 22:02

## 2025-05-06 RX ADMIN — HYDROMORPHONE HYDROCHLORIDE 0.4 MG: 0.2 INJECTION, SOLUTION INTRAMUSCULAR; INTRAVENOUS; SUBCUTANEOUS at 06:03

## 2025-05-06 ASSESSMENT — ACTIVITIES OF DAILY LIVING (ADL)
ADLS_ACUITY_SCORE: 55
ADLS_ACUITY_SCORE: 55
ADLS_ACUITY_SCORE: 60
ADLS_ACUITY_SCORE: 53
ADLS_ACUITY_SCORE: 55
ADLS_ACUITY_SCORE: 60
ADLS_ACUITY_SCORE: 55
ADLS_ACUITY_SCORE: 53
ADLS_ACUITY_SCORE: 53
ADLS_ACUITY_SCORE: 55
ADLS_ACUITY_SCORE: 53
ADLS_ACUITY_SCORE: 53
ADLS_ACUITY_SCORE: 60
ADLS_ACUITY_SCORE: 53
ADLS_ACUITY_SCORE: 55
ADLS_ACUITY_SCORE: 53
ADLS_ACUITY_SCORE: 53
ADLS_ACUITY_SCORE: 55
ADLS_ACUITY_SCORE: 53

## 2025-05-06 NOTE — PROGRESS NOTES
Care Management Follow Up    Length of Stay (days): 2    Expected Discharge Date: 05/08/2025     Concerns to be Addressed: discharge planning     Patient plan of care discussed at interdisciplinary rounds: Yes    Anticipated Discharge Disposition: Home, Home Care (if agreeable)    Anticipated Discharge Services: Home Care  Anticipated Discharge DME: None    Referrals Placed by CM/SW:  Home care referral through [employer]  Private pay costs discussed: Not applicable    Discussed  Partnership in Safe Discharge Planning  document with patient/family: No     Handoff Completed: Yes, [facility] PCP: Internal handoff referral completed    Additional Information:  RNCC participated at interdisciplinary rounds and performed chart review. It was noted the patient will likely be able to discharge on 05/07/2025-05/08/2025. Physical therapy is recommending home care, and a referral was sent via the [employer]. The patient noted that she may or may not be interested in the services but will entertain the idea. Still pending acceptance/declination from agencies.    Internal hand-off was sent.    Next Steps:   []IMM at discharge  []Follow up on healthcare directive that was given 05/05/2025  []If accepted to a home care agency, update the patient  []Coordinate discharge with home care agency (if patient agreeable)        Ciera Jarvis RN Care Coordinator  Reachable on [facility] or Teams  [phone] (updated daily)

## 2025-05-06 NOTE — PROGRESS NOTES
Emergency General Surgery Progress Note  5/6/2025      Subjective:   Patient doing well this am abdominal pain improved significantly continues to endorse back pain. NGT placed to LIS. NG output about 900cc though she has had a couple cups of ice chips. Denies n/v. Passing gas and had bm. Abdominal pain controlled with current medications but requests a pain management consult for back pain.     Objective:   Temp:  [98.7  F (37.1  C)-100  F (37.8  C)] 98.7  F (37.1  C)  Pulse:  [81-91] 87  Resp:  [16-20] 18  BP: (131-157)/() 150/96  Cuff Mean (mmHg):  [104] 104  SpO2:  [93 %-100 %] 100 %  I/O last 3 completed shifts:  In: 1300 [I.V.:1100; IV Piggyback:200]  Out: 2305 [Urine:855; Emesis/NG output:1450]      Gen: A&O x3, in NAD. NGT in place  CV: RRR. Pulses 2+  Pulm: Nonlabored breathing on RA  Abd: Soft, appropriately tender, mildly distended. Midline abdominal incision covered with abdominal pad, removed, and gauze, with marker showing minimal strikethrough (no new strikethrough compared to marker). Incision CDI with staplers.   : Fong out  Extremities: warm and well perfused, moves all extremities spontaneously. No peripheral edema  Neuro: CN II-XII grossly intact, no focal deficits observed       Labs:  BMP   Recent Labs   Lab 05/06/25  0611 05/06/25  0609 05/05/25  0623 05/05/25  0533 05/04/25  2224 05/04/25  1917 05/04/25  1631 05/04/25  1455 05/04/25  1212     --   --  141  --   --  144  --  143   POTASSIUM 3.4  --   --  3.5  --   --  3.1*  --  2.2*   CHLORIDE 103  --   --  104  --   --   --   --  114*   RAMBO 8.5*  --   --  8.5*  --   --   --   --  6.3*   CO2 22  --   --  23  --   --   --   --  18*   BUN 20.6  --   --  20.0  --   --   --   --  18.1   CR 0.81  --   --  0.84 0.84  --   --   --  0.60   GLC 95 92 100* 109*  --    < > 97   < > 98   MAG 2.1  --   --  2.1  --   --   --   --  1.4*   PHOS 1.9*  --   --  3.0  --   --   --   --   --     < > = values in this interval not displayed.     CBC    Recent Labs   Lab 05/06/25  0611 05/05/25  0533 05/04/25  1631 05/04/25  1212   WBC 11.4* 11.5*  --  6.8   RBC 3.18* 3.50*  --  3.23*   HGB 9.2* 10.1* 11.4* 9.6*   MCV 88 89  --  90   MCH 28.9 28.9  --  29.7   MCHC 32.7 32.3  --  33.2   RDW 15.7* 15.6*  --  15.3*    203  --  221         Imaging:   Received 5/4 CT Abdomen & Pelvis    IMPRESSION:   1.  Previously fat-containing right abdominal wall hernia now contains  a small bowel loop which is borderline dilated and is decompressed as  it reenters the abdominal cavity, suggesting possible obstruction at  this site. There is normal enhancement of the bowel wall with mild  surrounding inflammatory changes and free fluid in the hernia sac.  Upstream small bowel is borderline dilated measuring up to 3cm in  diameter.  2.  Multiple hyperdense lesions within the bilateral kidneys, may  represent proteinaceous/hemorrhagic cysts. Recommend nonemergent  dedicated MRI renal mass protocol.  3.   Hyperdense left adrenal lesion measuring up to 3 cm. This also  can be evaluated on MRI abdomen.      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 Imaging demonstrated incarcerated ventral hernia, prompting EGS involvement. On 5/4, patient underwent diagnostic laparoscopy converted to laparotomy with lysis of adhesions, small bowel resection, and primary closure of ventral hernia (no mesh).    Patient is post-operative day #1, with NGT in place to LIS. Will maintain NGT in place, minimize PO medications and avoid clamping NGT. Discussed with pharmacy and will plan for 48 hours of IV Tylenol for pain control.       Today's Changes (5/5/2025):   - NPO, LR @ 100 mL/hr   - Maintain NGT to LIS, avoid clamping NGT   - Minimize medications via NGT given  significant nausea with clamping of NGT   - Flush NGT qshift: flush blue port with air only and then flush clear port with 30cc of saline qshift  - IV Tylenol x48 hours, IV dilaudid and IV Robaxin PRN for pain control. Holding PO oxycodone. Discussed IV Tylenol with pharmacy.  - Will transition oral medications to IV medications where able  - Oral Levothyroxine for hypothyroidism  - IV PPI ordered given PTA PPI   - Holding PTA anti-hypertensive medications, will have IV labetalol and hydralazine available PRN for elevated SBP >160.   - IS at bedside, encourage IS  - OOB, ambulate       This patient was seen and discussed with chief resident who discussed with staff    Edward Brewer MD  Urology PGY-1  General surgery service

## 2025-05-06 NOTE — PLAN OF CARE
Goal Outcome Evaluation:      Plan of Care Reviewed With: patient    Overall Patient Progress: no change    Hypertensive but no PRNs needed, all other VSS, on RA. Patient cooperative but restless. C/o chronic back pain getting PRN Dilaudid, Robaxin, and scheduled Tylenol- some relief per patient. Complains of nausea, getting prn Zofran and compazine with relief. Patient has lower R abdominal incision closed with staples CONNIE. Large abdominal hematoma on lower left side. NG on LIS, with bile colored output. NPO. SBA. Continue with NPO status, manage pain, and nausea.

## 2025-05-06 NOTE — PLAN OF CARE
Goal Outcome Evaluation:  A&Ox4. Restless. Pt follows command. B/P runs high; below parameter for PRN antihypertensive meds. Pt with intermittent nausea and dry heave; PRN zofran IV  with some relief. NG to low continuous suction with 450 ml dark green thick fluid output. NG landmark was found to be at 30 cm; NG was pushed back per Md order; NG has a landmark of 70 cm currently confirmed with abdominal x-ray this morning.NPO  Abdominal incision site pain and chronic back pain relief with PRN dilaudid 0.4 mg IV and scheduled tylenol 1000 mg IV.     Pt reported that she is passing gas. No Bm yet. Fong removed at 2:20 pm; pt just tried to void and pt was able to void 30 ml; pt stated that she would like to try to pee one more time before PVR bladder scan.  Up walking in [place] with a walker and GB escorted by physical therapy this morning.  Pt just got a new PIV;  lost PIV x3 since last night.Pt needs two bags of potassium when IV antibiotic is done for a K of 3.5  Continue with POC    A

## 2025-05-06 NOTE — CONSULTS
"Pain Service Consultation Note  [facility]      Patient Name: Nadira Perez  MRN: [id number]   Age: 74 year old  Sex: female  Date: May 6, 2025                                       Referring Provider:  Mireya Roberto MD  Referring Service:  General Surgery, Adult  Reason for Consultation: Patient requesting pain consult for acute on chronic back pain     Assessment/Recommendations:  74F with a past medical history of HTN, HLD, stage IIIc  breast cancer s/p bilateral mastectomy, TIIDM, peripheral neuropathy, lumbar spinal stenosis, DDD s/p spinal fusion L5-S1 who presented 5/4 FTH incarcerated ventral hernia. POD 1 from diagnostic laparoscopy converted to open laparotomy with lysis of adhesions, small bowel resection, and hernia repair.     Plan:   To address inpatient:  - Pain regiment per primary team  - Agree with PRN narcotics, acetaminophen and muscle relaxants, transition to PO formulations as soon as able  - Start PTA Gabapentin 1200 TID if able with liquid formulations vs when transitioning to PO    To be addressed outpatient  - Working with pain clinic to rescheduled her missed appointment  - High likelihood of Sacroiliitis based on clinical exam, consideration of procedural intervention will be done on an outpatient basis  - Would recommend Meloxicam or other equivalent anti-inflammatory medication, in addition to current home regiment of Gabapentin and Tizanidine.  -No need to prescribe additional PO narcotics beyond that needed for surgical pain upon discharge.       Thank you for the opportunity to participate in the care of Nadira Perez  Pain Service will sign off.    Discussed with attending anesthesiologist  Primary Service Contacted with Recommendations? Yes    Please Page the Pain Team Via Amcom: \"PAIN MANAGEMENT ACUTE INPATIENT [facility]\"    Juan Merino MD  5/6/2025      Chief Complaint:  Acute on chronic back pain, \"I missed my pain clinic " "appointment\"      History of Present Illness:  Nadira Perez is a 74 year old old female  HTN, HLD, stage IIIc  breast cancer s/p bilateral mastectomy, TIIDM. She has a history of chronic back pain addressed with L5-S1 spinal fusion in 2021, in addition to steiod injections. She has been seeing Renée Harris as an outpatient but missed her appointment while being admitted to address her current medical issues of an incarcerated ventral hernia. She is currently on gabapentin 1200 TID, tizanidine 4mg at bedtime and recently 5mg oxycodone BID PRN per he sports medicine physician within the last few week. She reports acute worsening of the chronic pain, symptoms are a dull deep aching sensation R worse than L in her lower back, worsened with ambulation and movement and alleviated with rest. Relieved by heat worsened by cold sensations.  It is without radiation.    Pain Assessment:   Description of Pain: ache, dull   Location: R worse than L lower back paravertebral  Current Pain: 4/10 on IV narcotics while inpatient  Goal: 3/10  Baseline Pain Level: 6/10  Onset: acute worsening 2 weeks prior   Relieving/exacerbating factors: ambulation, ROM worsening things, relieved by heat application   Radiating: no   Localized: yes  Constant: yes  Intermittent: no    Past Medical History:  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    Dementia Brother         hydrocephalis    Circulatory Brother         Perihperal Neurophathy    Peripheral Neuropathy Brother     Peripheral Neuropathy Brother     Diabetes Maternal Grandmother         Presumed Type 2    Asthma Maternal Grandmother     Chronic Obstructive Pulmonary Disease Maternal Grandfather         father    Asthma Maternal Grandfather     Diabetes Maternal Aunt         x2    Melanoma Maternal Aunt     Glaucoma Maternal Aunt     Breast Cancer Cousin     Breast Cancer Cousin     Dementia Other     Cancer Other         malignant melanoma    Hypertension Other     Hypertension Other     Cerebrovascular Disease Other     Cerebrovascular Disease Other     Obesity Other     Respiratory Other     Cancer Other     Diabetes Other     Asthma Other     Other Cancer Other         Malignant melanoma    Obesity Other     Macular Degeneration No family hx of     Kidney Disease No family hx of     Thrombosis No family hx of     Arthritis No family hx of     Depression No family hx of     Substance Abuse No family hx of     Cystic Fibrosis No family hx of     Early Death No family hx of     Coronary Artery Disease Early Onset No family hx of     Heart Failure No family hx of     Bleeding Diathesis No family hx of     Ovarian Cancer No family hx of     Uterine Cancer No family hx of     Prostate Cancer No family hx of     Colorectal Cancer No family hx of     Pancreatic Cancer No family hx of     Lung Cancer No family hx of     Autoimmune Disease No family hx of     Unknown/Adopted No family hx of     Genetic  "Disorder No family hx of     Bleeding Disorder No family hx of     Clotting Disorder No family hx of     Anesthesia Reaction No family hx of       reviewed family history and noncontributory to pain consult     Social History:  Social History     Tobacco Use    Smoking status: Never     Passive exposure: Never    Smokeless tobacco: Never   Substance Use Topics    Alcohol use: Yes     Alcohol/week: 7.0 standard drinks of alcohol     Types: 7 Glasses of wine per week     Comment: Rare if ever             Review of Systems:  Complete ROS reviewed. Unless otherwise noted, all other systems found to be negative.        Laboratory Results:  Recent Labs   Lab Test 05/06/25  0611 05/05/25  0533 05/04/25  1212   PROTIME  --   --  14.5   INR  --   --  1.09      < > 221   PTT  --   --  28   BUN 20.6   < > 18.1    < > = values in this interval not displayed.         Allergies:  Allergies   Allergen Reactions    Erythromycin Nausea    Penicillins Hives     Around age 6 - doesn't recall full reaction, mother told her it was hives.     Has tolerated cephalsporins.          Pain Medications:  Pain Medications/Prescriber: Sahil Donohue    Current Pain Related Medications:  Medications related to Pain Management (From now, onward)      Start     Dose/Rate Route Frequency Ordered Stop    05/05/25 1300  acetaminophen (OFIRMEV) infusion 1,000 mg         1,000 mg  400 mL/hr over 15 Minutes Intravenous EVERY 8 HOURS 05/05/25 0853 05/07/25 1359    05/05/25 0753  methocarbamol (ROBAXIN) injection 500 mg         500 mg Intravenous EVERY 8 HOURS PRN 05/05/25 0753 05/08/25 0752    05/04/25 2158  HYDROmorphone (DILAUDID) injection 0.2 mg        Placed in \"Or\" Linked Group    0.2 mg Intravenous EVERY 2 HOURS PRN 05/04/25 2158      05/04/25 2158  HYDROmorphone (DILAUDID) injection 0.4 mg        Placed in \"Or\" Linked Group    0.4 mg Intravenous EVERY 2 HOURS PRN 05/04/25 2158      05/04/25 2158  lidocaine 1 % 0.1-1 mL         0.1-1 mL Other " "EVERY 1 HOUR PRN 05/04/25 2158 05/04/25 2158  lidocaine (LMX4) cream          Topical EVERY 1 HOUR PRN 05/04/25 2158 05/04/25 1920  [Held by provider]  oxyCODONE (ROXICODONE) tablet 5 mg        (On hold since yesterday at 0752 until manually unheld; held by Mireya Roberto MDHold Reason: NPOHold Comments: Do not give via NGT)   Placed in \"Or\" Linked Group    5 mg Per NG tube EVERY 4 HOURS PRN 05/04/25 1920 05/04/25 1920  [Held by provider]  oxyCODONE IR (ROXICODONE) tablet 10 mg        (On hold since yesterday at 0752 until manually unheld; held by Mireya Roberto MDHold Reason: NPOHold Comments: Do not give via NGT)   Placed in \"Or\" Linked Group    10 mg Per NG tube EVERY 4 HOURS PRN 05/04/25 1920                Physical Exam:  Vitals: BP (!) 150/96 (BP Location: Left arm)   Pulse 87   Temp 98.7  F (37.1  C) (Oral)   Resp 18   Ht 1.651 m (5' 5\")   Wt 74.3 kg (163 lb 11.2 oz)   LMP  (LMP Unknown)   SpO2 100%   BMI 27.24 kg/m      Physical Exam:   CONSTITUTIONAL/GENERAL APPEARANCE:  NAD  EYES: EOMI, sclera anicteric, PERRLA  ENT/NECK: atraumatic, lips and oral mucous membranes dry  RESPIRATORY: non-labored breathing. No cough, wheeze  CV: RRR, no audible rubs, gallops, or murmurs  ABDOMEN: Soft, non-tender, non-distended  MUSCULOSKELETAL/BACK/SPINE/EXTREMITIES: Moves all extremities purposefully.  No edema or obvious joint deformities. Positive SI compression test on R side, Positive Gaenslen and Favors maneuvers on R side  NEURO: Alert and Oriented x3. Answers questions appropriately  SKIN/VASCULAR EXAM:  No jaundice, no visible rashes or lesions       "

## 2025-05-06 NOTE — PROGRESS NOTES
Occupational Therapy: Defer - Orders received. Chart reviewed and discussed with care team.?Occupational Therapy not indicated.?Per discussion with PT, patient is completing ADLs SBA in room with FWW, has hired support for IADLs in home setting, and is appropriate for single rehab discipline to follow. No IP OT needs identified with pt reporting no OT needs at this time. Pt is very familiar with OT and acknowledged that she has what he needs in home setting between sisters-in-law and hired support for IADLs. PT to continue to follow. Defer discharge recommendations to PT and medical team.?Will complete orders. Please re-order OT if further needs arise.

## 2025-05-07 ENCOUNTER — APPOINTMENT (OUTPATIENT)
Dept: PHYSICAL THERAPY | Facility: CLINIC | Age: 75
End: 2025-05-07
Payer: COMMERCIAL

## 2025-05-07 LAB
ANION GAP SERPL CALCULATED.3IONS-SCNC: 15 MMOL/L (ref 7–15)
BUN SERPL-MCNC: 12.9 MG/DL (ref 8–23)
CALCIUM SERPL-MCNC: 8.2 MG/DL (ref 8.8–10.4)
CHLORIDE SERPL-SCNC: 103 MMOL/L (ref 98–107)
CREAT SERPL-MCNC: 0.66 MG/DL (ref 0.51–0.95)
EGFRCR SERPLBLD CKD-EPI 2021: >90 ML/MIN/1.73M2
ERYTHROCYTE [DISTWIDTH] IN BLOOD BY AUTOMATED COUNT: 15.5 % (ref 10–15)
GLUCOSE SERPL-MCNC: 81 MG/DL (ref 70–99)
HCO3 SERPL-SCNC: 21 MMOL/L (ref 22–29)
HCT VFR BLD AUTO: 28.1 % (ref 35–47)
HGB BLD-MCNC: 9.1 G/DL (ref 11.7–15.7)
MAGNESIUM SERPL-MCNC: 1.8 MG/DL (ref 1.7–2.3)
MCH RBC QN AUTO: 29.4 PG (ref 26.5–33)
MCHC RBC AUTO-ENTMCNC: 32.4 G/DL (ref 31.5–36.5)
MCV RBC AUTO: 91 FL (ref 78–100)
PATH REPORT.COMMENTS IMP SPEC: NORMAL
PATH REPORT.COMMENTS IMP SPEC: NORMAL
PATH REPORT.FINAL DX SPEC: NORMAL
PATH REPORT.GROSS SPEC: NORMAL
PATH REPORT.MICROSCOPIC SPEC OTHER STN: NORMAL
PATH REPORT.RELEVANT HX SPEC: NORMAL
PHOSPHATE SERPL-MCNC: 2.1 MG/DL (ref 2.5–4.5)
PHOTO IMAGE: NORMAL
PLATELET # BLD AUTO: 236 10E3/UL (ref 150–450)
POTASSIUM SERPL-SCNC: 3.5 MMOL/L (ref 3.4–5.3)
RBC # BLD AUTO: 3.09 10E6/UL (ref 3.8–5.2)
SODIUM SERPL-SCNC: 139 MMOL/L (ref 135–145)
WBC # BLD AUTO: 8.8 10E3/UL (ref 4–11)

## 2025-05-07 PROCEDURE — 250N000011 HC RX IP 250 OP 636: Performed by: STUDENT IN AN ORGANIZED HEALTH CARE EDUCATION/TRAINING PROGRAM

## 2025-05-07 PROCEDURE — 97116 GAIT TRAINING THERAPY: CPT | Mod: GP

## 2025-05-07 PROCEDURE — 250N000011 HC RX IP 250 OP 636

## 2025-05-07 PROCEDURE — 250N000011 HC RX IP 250 OP 636: Performed by: SURGERY

## 2025-05-07 PROCEDURE — 83735 ASSAY OF MAGNESIUM: CPT | Performed by: STUDENT IN AN ORGANIZED HEALTH CARE EDUCATION/TRAINING PROGRAM

## 2025-05-07 PROCEDURE — 85014 HEMATOCRIT: CPT | Performed by: STUDENT IN AN ORGANIZED HEALTH CARE EDUCATION/TRAINING PROGRAM

## 2025-05-07 PROCEDURE — 36415 COLL VENOUS BLD VENIPUNCTURE: CPT | Performed by: STUDENT IN AN ORGANIZED HEALTH CARE EDUCATION/TRAINING PROGRAM

## 2025-05-07 PROCEDURE — 250N000013 HC RX MED GY IP 250 OP 250 PS 637: Performed by: STUDENT IN AN ORGANIZED HEALTH CARE EDUCATION/TRAINING PROGRAM

## 2025-05-07 PROCEDURE — 250N000009 HC RX 250: Performed by: SURGERY

## 2025-05-07 PROCEDURE — 97530 THERAPEUTIC ACTIVITIES: CPT | Mod: GP

## 2025-05-07 PROCEDURE — 999N000248 HC STATISTIC IV INSERT WITH US BY RN

## 2025-05-07 PROCEDURE — 258N000003 HC RX IP 258 OP 636: Performed by: DERMATOLOGY

## 2025-05-07 PROCEDURE — 84100 ASSAY OF PHOSPHORUS: CPT | Performed by: STUDENT IN AN ORGANIZED HEALTH CARE EDUCATION/TRAINING PROGRAM

## 2025-05-07 PROCEDURE — 258N000003 HC RX IP 258 OP 636: Performed by: STUDENT IN AN ORGANIZED HEALTH CARE EDUCATION/TRAINING PROGRAM

## 2025-05-07 PROCEDURE — 258N000003 HC RX IP 258 OP 636: Performed by: SURGERY

## 2025-05-07 PROCEDURE — 80048 BASIC METABOLIC PNL TOTAL CA: CPT | Performed by: STUDENT IN AN ORGANIZED HEALTH CARE EDUCATION/TRAINING PROGRAM

## 2025-05-07 PROCEDURE — 120N000002 HC R&B MED SURG/OB UMMC

## 2025-05-07 PROCEDURE — 250N000009 HC RX 250: Performed by: DERMATOLOGY

## 2025-05-07 RX ORDER — ONDANSETRON 2 MG/ML
4 INJECTION INTRAMUSCULAR; INTRAVENOUS ONCE
Status: COMPLETED | OUTPATIENT
Start: 2025-05-07 | End: 2025-05-07

## 2025-05-07 RX ORDER — POTASSIUM CHLORIDE 7.45 MG/ML
10 INJECTION INTRAVENOUS
Status: COMPLETED | OUTPATIENT
Start: 2025-05-07 | End: 2025-05-07

## 2025-05-07 RX ORDER — ACETAMINOPHEN 325 MG/1
975 TABLET ORAL EVERY 8 HOURS
Status: DISCONTINUED | OUTPATIENT
Start: 2025-05-07 | End: 2025-05-08

## 2025-05-07 RX ORDER — LIDOCAINE 4 G/G
2 PATCH TOPICAL
Status: DISPENSED | OUTPATIENT
Start: 2025-05-07

## 2025-05-07 RX ORDER — ACETAMINOPHEN 650 MG/1
650 SUPPOSITORY RECTAL EVERY 4 HOURS PRN
Status: DISCONTINUED | OUTPATIENT
Start: 2025-05-07 | End: 2025-05-08

## 2025-05-07 RX ORDER — MAGNESIUM SULFATE HEPTAHYDRATE 40 MG/ML
2 INJECTION, SOLUTION INTRAVENOUS ONCE
Status: COMPLETED | OUTPATIENT
Start: 2025-05-07 | End: 2025-05-07

## 2025-05-07 RX ADMIN — ENOXAPARIN SODIUM 40 MG: 40 INJECTION SUBCUTANEOUS at 08:14

## 2025-05-07 RX ADMIN — SODIUM CHLORIDE, SODIUM LACTATE, POTASSIUM CHLORIDE, AND CALCIUM CHLORIDE: .6; .31; .03; .02 INJECTION, SOLUTION INTRAVENOUS at 16:36

## 2025-05-07 RX ADMIN — ONDANSETRON 4 MG: 2 INJECTION, SOLUTION INTRAMUSCULAR; INTRAVENOUS at 08:14

## 2025-05-07 RX ADMIN — SODIUM CHLORIDE, SODIUM LACTATE, POTASSIUM CHLORIDE, AND CALCIUM CHLORIDE: .6; .31; .03; .02 INJECTION, SOLUTION INTRAVENOUS at 05:08

## 2025-05-07 RX ADMIN — POTASSIUM PHOSPHATE, MONOBASIC POTASSIUM PHOSPHATE, DIBASIC 9 MMOL: 224; 236 INJECTION, SOLUTION, CONCENTRATE INTRAVENOUS at 09:50

## 2025-05-07 RX ADMIN — HYDROMORPHONE HYDROCHLORIDE 0.4 MG: 0.2 INJECTION, SOLUTION INTRAMUSCULAR; INTRAVENOUS; SUBCUTANEOUS at 11:06

## 2025-05-07 RX ADMIN — ACETAMINOPHEN 650 MG: 650 SUPPOSITORY RECTAL at 21:18

## 2025-05-07 RX ADMIN — PANTOPRAZOLE SODIUM 40 MG: 40 INJECTION, POWDER, FOR SOLUTION INTRAVENOUS at 08:10

## 2025-05-07 RX ADMIN — HYDROMORPHONE HYDROCHLORIDE 0.4 MG: 0.2 INJECTION, SOLUTION INTRAMUSCULAR; INTRAVENOUS; SUBCUTANEOUS at 19:50

## 2025-05-07 RX ADMIN — PROCHLORPERAZINE EDISYLATE 5 MG: 5 INJECTION INTRAMUSCULAR; INTRAVENOUS at 00:42

## 2025-05-07 RX ADMIN — HYDROMORPHONE HYDROCHLORIDE 0.4 MG: 0.2 INJECTION, SOLUTION INTRAMUSCULAR; INTRAVENOUS; SUBCUTANEOUS at 17:20

## 2025-05-07 RX ADMIN — HYDROMORPHONE HYDROCHLORIDE 0.4 MG: 0.2 INJECTION, SOLUTION INTRAMUSCULAR; INTRAVENOUS; SUBCUTANEOUS at 01:22

## 2025-05-07 RX ADMIN — HYDRALAZINE HYDROCHLORIDE 20 MG: 20 INJECTION INTRAMUSCULAR; INTRAVENOUS at 18:14

## 2025-05-07 RX ADMIN — HYDROMORPHONE HYDROCHLORIDE 0.4 MG: 0.2 INJECTION, SOLUTION INTRAMUSCULAR; INTRAVENOUS; SUBCUTANEOUS at 15:03

## 2025-05-07 RX ADMIN — ACETAMINOPHEN 1000 MG: 10 INJECTION INTRAVENOUS at 06:26

## 2025-05-07 RX ADMIN — POTASSIUM CHLORIDE 10 MEQ: 7.46 INJECTION, SOLUTION INTRAVENOUS at 08:14

## 2025-05-07 RX ADMIN — LIDOCAINE 4% 2 PATCH: 40 PATCH TOPICAL at 21:14

## 2025-05-07 RX ADMIN — PROCHLORPERAZINE EDISYLATE 5 MG: 5 INJECTION INTRAMUSCULAR; INTRAVENOUS at 17:20

## 2025-05-07 RX ADMIN — HYDROMORPHONE HYDROCHLORIDE 0.4 MG: 0.2 INJECTION, SOLUTION INTRAMUSCULAR; INTRAVENOUS; SUBCUTANEOUS at 08:30

## 2025-05-07 RX ADMIN — POTASSIUM PHOSPHATE, MONOBASIC POTASSIUM PHOSPHATE, DIBASIC 9 MMOL: 224; 236 INJECTION, SOLUTION, CONCENTRATE INTRAVENOUS at 00:42

## 2025-05-07 RX ADMIN — HYDRALAZINE HYDROCHLORIDE 10 MG: 20 INJECTION INTRAMUSCULAR; INTRAVENOUS at 17:11

## 2025-05-07 RX ADMIN — ONDANSETRON 4 MG: 2 INJECTION, SOLUTION INTRAMUSCULAR; INTRAVENOUS at 15:05

## 2025-05-07 RX ADMIN — ONDANSETRON 4 MG: 2 INJECTION, SOLUTION INTRAMUSCULAR; INTRAVENOUS at 19:50

## 2025-05-07 RX ADMIN — HYDROMORPHONE HYDROCHLORIDE 0.4 MG: 0.2 INJECTION, SOLUTION INTRAMUSCULAR; INTRAVENOUS; SUBCUTANEOUS at 12:53

## 2025-05-07 RX ADMIN — POTASSIUM CHLORIDE 10 MEQ: 7.46 INJECTION, SOLUTION INTRAVENOUS at 09:42

## 2025-05-07 RX ADMIN — HYDROMORPHONE HYDROCHLORIDE 0.4 MG: 0.2 INJECTION, SOLUTION INTRAMUSCULAR; INTRAVENOUS; SUBCUTANEOUS at 04:23

## 2025-05-07 RX ADMIN — MAGNESIUM SULFATE HEPTAHYDRATE 2 G: 2 INJECTION, SOLUTION INTRAVENOUS at 08:10

## 2025-05-07 ASSESSMENT — ACTIVITIES OF DAILY LIVING (ADL)
ADLS_ACUITY_SCORE: 48
ADLS_ACUITY_SCORE: 52
ADLS_ACUITY_SCORE: 48
ADLS_ACUITY_SCORE: 60
ADLS_ACUITY_SCORE: 48
ADLS_ACUITY_SCORE: 60
ADLS_ACUITY_SCORE: 48
ADLS_ACUITY_SCORE: 48
ADLS_ACUITY_SCORE: 60
ADLS_ACUITY_SCORE: 60
ADLS_ACUITY_SCORE: 48
ADLS_ACUITY_SCORE: 60
ADLS_ACUITY_SCORE: 48
ADLS_ACUITY_SCORE: 48
ADLS_ACUITY_SCORE: 60
ADLS_ACUITY_SCORE: 60
ADLS_ACUITY_SCORE: 48

## 2025-05-07 NOTE — PROGRESS NOTES
Care Management Follow Up    Length of Stay (days): 3    Expected Discharge Date: 05/08/2025     Concerns to be Addressed: discharge planning     Patient plan of care discussed at interdisciplinary rounds: Yes    Anticipated Discharge Disposition: Home, Home Care     Anticipated Discharge Services: Home Care  Anticipated Discharge DME: None    Patient/family educated on Medicare website which has current facility and service quality ratings: yes  Education Provided on the Discharge Plan:    Patient/Family in Agreement with the Plan:      Referrals Placed by CM/SW: Internal Clinic Care Coordination, Homecare  Private pay costs discussed: no    Discussed  Partnership in Safe Discharge Planning  document with patient/family: No     Handoff Completed: Yes, [facility] PCP: Internal handoff referral completed    Additional Information:  Nadira Perez is a 74 year old female with PMHx of HTN, 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 Imaging demonstrated incarcerated ventral hernia, prompting EGS involvement. On 5/4, patient underwent diagnostic laparoscopy converted to laparotomy with lysis of adhesions, small bowel resection, and primary closure of ventral hernia (no mesh).     TODAY: Patient continues to have bilious output from NGT. Output appears to be decreasing, so will return to bedside this PM to evaluate NGT output and patient clinical condition.     [id number]    [facility] (PT)  [address], [address]  [place] [facility] [id number] [phone]      Next Steps:   []IMM at discharge  []Follow up on healthcare directive that was given 05/05/2025  [] [facility] accepting home care agency, update the patient  []Coordinate discharge with home care agency (if patient agreeable)    Jessa Dennis RNCC  [place]  5/7/2025  Nurse Coordinator    Covering for 5A  Phone [phone]      Social Work and Care Management Department   SEARCHABLE in AMCOM - search CARE COORDINATOR   [place] [address] [place] ([phone]) Saturday & Sunday; [phone]) [facility] Recognized Holidays   Units: 5A Onc 5201 - 5219 RNCC,  5A Onc 5220 thru 5240 RNCC, 5C OFFSERVICE [phone] RNCC & 5C OFF SERVICE [phone] RNCC   Units: 6B Vocera, 6C Card 6401 thru 6420 RNCC, 6C Card 6502 thru 6514 RNCC & 6C Card 6515 thru 6519 RNCC    Units: 7A SOT RNCC Vocera, 7B Med Surg Vocera, 7C Med Surg 7401 thru 7418 RNCC & 7C Med Surg 7502 thru 7521 RNCC   Units: 6A Vocera & 4A CVICU Vocera, 4C MICU Vocera, and 4E SICU Vocera     Units: 5 Ortho Vocera & 5 Med Surg Vocera    Units: 6 Med Surg Vocera & 8 Med Surg Vocera

## 2025-05-07 NOTE — PLAN OF CARE
"Goal Outcome Evaluation:      Plan of Care Reviewed With: patient    Overall Patient Progress: no changeOverall Patient Progress: no change    Outcome Evaluation: 0700 - 1930:   BP (!) 162/112 (BP Location: Left arm)   Pulse 84   Temp 99  F (37.2  C) (Oral)   Resp 18   Ht 1.651 m (5' 5\")   Wt 74.3 kg (163 lb 11.2 oz)   LMP  (LMP Unknown)   SpO2 96%   BMI 27.24 kg/m      IV K+, IV Mg & IV phos replaced from AM lab & recheck tomorrow AM.  Back pain managing with IV dilaudid 0.4 mg q2 hrs.  Gave IV zofran x 2 for intermittent nausea, no vomit ex some dry heaving.  NPO, on  ml/hr via R PIV, NG on LIS with bile/green output, passing gas but no bm yet, last bm 5/4.  Pt decline to try tylenol d/t nausea.  PT ambulated pt in hallway & pt tolerate well.  Pt c/o urgency & incontinence of urine not  making to commode, goes in brief, pt was educated to use external cath at night time to stay dry, wears brief.  Continue with poc....    Addendum: Gave IV hydralazine 10 mg, 20 mg, BP still 169/110, provider notified.      Significant 24 hour events:     Level of Care: Acute    Summary Type: 5A Post Op Report   POD: #3 = 5/7  LAPAROSCOPY, DIAGNOSTIC with lysis of adhesions, converted to open laparotomy with small bowel resection  x1, N/A - Abdomen   Complications:    Pain:  Uncotrolled Pain given PRN Dilaudid   Neuro:WDL  CV:WDL  Resp:WDL  GI: WDL except nausea and dry heaving  Zofran to help manage   :WDL -  Skin: .WDL except, color, integrityWDL except surgical site UTV  Activity Assistance: assistance, 1 person   Safety/Falls Risk: Level of Risk: High Risk  Consults: PT/OT  Procedures/Imaging:   Precautions: Isolation Precautions-MRSA contact                     "

## 2025-05-07 NOTE — PLAN OF CARE
19:00- 07:00  Goal Outcome Evaluation:      Plan of Care Reviewed With: patient    Overall Patient Progress: no change    Summary Type: 5A Post Op Report     POD: #3 = 5/7    Pain:  Uncotrolled Pain mostly in LL back, given PRN Dilaudid x4   Neuro:WDL  CV:WDL  Resp:WDL, denies SOB  GI: WDL except nausea and dry heaving, PRN Zofran and Compazine given to help manage. With NG to LIS.  :WDL, voiding spontaneously w/ LOMAS  Skin: .WDL except integrity- w/ large hematoma on L lower abdomen, R abdominal incision CONNIE, closed with staples  Activity Assistance: assistance, 1 person - w/ walker, put R leg brace when walking  Safety/Falls Risk: Level of Risk: High Risk  Consults: PT/OT  Precautions: Isolation Precautions-MRSA contact      AOx4, patient requested to have continuous pulse ox when sleeping. Slightly hypertensive but other VSS, on RA. LR running at 100ml/hr via PIV. Strict NPO. NG to LIS. Phosphate replaced. Continue w/ POC.

## 2025-05-07 NOTE — PROGRESS NOTES
Emergency General Surgery Progress Note  5/7/2025      Subjective:   No acute overnight events. Patient continues to report some nausea but improving. NGT remains in place to LIS, bilious output. Passing gas, no bowel movements. Voiding. Into chair yesterday and ambulated in halls x1. Reports abdominal pain controlled, ongoing chronic back pain. Pain team saw and signed off inpatient with instructions to follow up as outpatient as needed.     Objective:   Temp:  [98.9  F (37.2  C)-99.6  F (37.6  C)] 99.2  F (37.3  C)  Pulse:  [77-85] 77  Resp:  [16-18] 18  BP: (130-149)/(88-94) 149/93  SpO2:  [95 %-98 %] 97 %  I/O last 3 completed shifts:  In: 1550 [I.V.:1200; IV Piggyback:350]  Out: 2800 [Urine:650; Emesis/NG output:2150]      Gen: A&O x3, in NAD. NGT in place  CV: RRR. Pulses 2+  Pulm: Nonlabored breathing on RA  Abd: Soft, appropriately tender, not distended. Midline incision closed with staples, well approximated, no drainage expressed or surrounding erythema. Laparoscopic incisions clean, dry, intact but with significant surrounding ecchymosis. Remains soft, not tender to palpation.   Extremities: warm and well perfused, moves all extremities spontaneously. No peripheral edema  Neuro: CN II-XII grossly intact, no focal deficits observed       Labs:  BMP   Recent Labs   Lab 05/07/25  0609 05/06/25  1935 05/06/25  0611 05/06/25  0609 05/05/25  0623 05/05/25  0533 05/04/25  2224 05/04/25  1917 05/04/25  1631 05/04/25  1455 05/04/25  1212     --  139  --   --  141  --   --  144  --  143   POTASSIUM 3.5 3.7 3.4  --   --  3.5  --   --  3.1*  --  2.2*   CHLORIDE 103  --  103  --   --  104  --   --   --   --  114*   RAMBO 8.2*  --  8.5*  --   --  8.5*  --   --   --   --  6.3*   CO2 21*  --  22  --   --  23  --   --   --   --  18*   BUN 12.9  --  20.6  --   --  20.0  --   --   --   --  18.1   CR 0.66  --  0.81  --   --  0.84 0.84  --   --   --  0.60   GLC 81  --  95 92 100* 109*  --    < > 97   < > 98   MAG 1.8  --   2.1  --   --  2.1  --   --   --   --  1.4*   PHOS 2.1* 2.0* 1.9*  --   --  3.0  --   --   --   --   --     < > = values in this interval not displayed.     CBC   Recent Labs   Lab 05/07/25  0609 05/06/25  0611 05/05/25  0533 05/04/25  1631 05/04/25  1212   WBC 8.8 11.4* 11.5*  --  6.8   RBC 3.09* 3.18* 3.50*  --  3.23*   HGB 9.1* 9.2* 10.1* 11.4* 9.6*   MCV 91 88 89  --  90   MCH 29.4 28.9 28.9  --  29.7   MCHC 32.4 32.7 32.3  --  33.2   RDW 15.5* 15.7* 15.6*  --  15.3*    250 203  --  221         Imaging:   No new imaging   Reviewed 5/4 CT Abdomen & Pelvis    IMPRESSION:   1.  Previously fat-containing right abdominal wall hernia now contains  a small bowel loop which is borderline dilated and is decompressed as  it reenters the abdominal cavity, suggesting possible obstruction at  this site. There is normal enhancement of the bowel wall with mild  surrounding inflammatory changes and free fluid in the hernia sac.  Upstream small bowel is borderline dilated measuring up to 3cm in  diameter.  2.  Multiple hyperdense lesions within the bilateral kidneys, may  represent proteinaceous/hemorrhagic cysts. Recommend nonemergent  dedicated MRI renal mass protocol.  3.   Hyperdense left adrenal lesion measuring up to 3 cm. This also  can be evaluated on MRI abdomen.      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 Imaging demonstrated incarcerated ventral hernia, prompting EGS involvement. On 5/4, patient underwent diagnostic laparoscopy converted to laparotomy with lysis of adhesions, small bowel resection, and primary closure of ventral hernia (no mesh).    Patient continues to have bilious output from NGT. Output appears to be decreasing, so will return to bedside this PM to evaluate  NGT output and patient clinical condition.       Today's Changes (5/7/2025):   - NPO, LR @ 100 mL/hr   - Maintain NGT to LIS, okay to clamp with medications   - Will reassess whether NGT can be removed this PM   - Trial PO Tylenol with clamping of NGT. If issues with clamping and meds via NG, will re-discuss IV Tylenol with Pharmacy.   - Flush NGT qshift: flush blue port with air only and then flush clear port with 30cc of saline qshift  - Oral Levothyroxine for hypothyroidism  - IV PPI ordered given PTA PPI   - Holding PTA anti-hypertensive medications, will have IV labetalol and hydralazine available PRN for elevated SBP >160.   - IS at bedside, encourage IS  - OOB, ambulate       This patient was seen and discussed with chief resident who discussed with staff    Mireya Roberto MD  General Surgery, PGY-1

## 2025-05-08 ENCOUNTER — PATIENT OUTREACH (OUTPATIENT)
Dept: PLASTIC SURGERY | Facility: CLINIC | Age: 75
End: 2025-05-08
Payer: COMMERCIAL

## 2025-05-08 VITALS
WEIGHT: 163.7 LBS | DIASTOLIC BLOOD PRESSURE: 93 MMHG | SYSTOLIC BLOOD PRESSURE: 154 MMHG | HEART RATE: 76 BPM | TEMPERATURE: 99.1 F | OXYGEN SATURATION: 97 % | RESPIRATION RATE: 18 BRPM | BODY MASS INDEX: 27.27 KG/M2 | HEIGHT: 65 IN

## 2025-05-08 LAB
MAGNESIUM SERPL-MCNC: 2 MG/DL (ref 1.7–2.3)
PHOSPHATE SERPL-MCNC: 2 MG/DL (ref 2.5–4.5)
POTASSIUM SERPL-SCNC: 2.9 MMOL/L (ref 3.4–5.3)
POTASSIUM SERPL-SCNC: 3.2 MMOL/L (ref 3.4–5.3)

## 2025-05-08 PROCEDURE — 250N000011 HC RX IP 250 OP 636: Performed by: SURGERY

## 2025-05-08 PROCEDURE — 84100 ASSAY OF PHOSPHORUS: CPT | Performed by: SURGERY

## 2025-05-08 PROCEDURE — 258N000003 HC RX IP 258 OP 636: Performed by: STUDENT IN AN ORGANIZED HEALTH CARE EDUCATION/TRAINING PROGRAM

## 2025-05-08 PROCEDURE — 258N000003 HC RX IP 258 OP 636: Performed by: SURGERY

## 2025-05-08 PROCEDURE — 36415 COLL VENOUS BLD VENIPUNCTURE: CPT | Performed by: SURGERY

## 2025-05-08 PROCEDURE — 250N000009 HC RX 250

## 2025-05-08 PROCEDURE — 250N000009 HC RX 250: Performed by: SURGERY

## 2025-05-08 PROCEDURE — 250N000013 HC RX MED GY IP 250 OP 250 PS 637

## 2025-05-08 PROCEDURE — 250N000011 HC RX IP 250 OP 636: Mod: JZ

## 2025-05-08 PROCEDURE — 250N000013 HC RX MED GY IP 250 OP 250 PS 637: Performed by: STUDENT IN AN ORGANIZED HEALTH CARE EDUCATION/TRAINING PROGRAM

## 2025-05-08 PROCEDURE — 84132 ASSAY OF SERUM POTASSIUM: CPT | Performed by: SURGERY

## 2025-05-08 PROCEDURE — 250N000011 HC RX IP 250 OP 636: Performed by: STUDENT IN AN ORGANIZED HEALTH CARE EDUCATION/TRAINING PROGRAM

## 2025-05-08 PROCEDURE — 83735 ASSAY OF MAGNESIUM: CPT | Performed by: SURGERY

## 2025-05-08 PROCEDURE — 250N000011 HC RX IP 250 OP 636: Performed by: DERMATOLOGY

## 2025-05-08 PROCEDURE — 120N000002 HC R&B MED SURG/OB UMMC

## 2025-05-08 RX ORDER — LIDOCAINE 40 MG/G
CREAM TOPICAL
Status: ACTIVE | OUTPATIENT
Start: 2025-05-08 | End: 2025-05-11

## 2025-05-08 RX ORDER — POTASSIUM CHLORIDE 7.45 MG/ML
10 INJECTION INTRAVENOUS
Status: COMPLETED | OUTPATIENT
Start: 2025-05-08 | End: 2025-05-08

## 2025-05-08 RX ORDER — ACETAMINOPHEN 650 MG/1
650 SUPPOSITORY RECTAL EVERY 4 HOURS PRN
Status: DISCONTINUED | OUTPATIENT
Start: 2025-05-08 | End: 2025-05-08

## 2025-05-08 RX ORDER — POTASSIUM CHLORIDE 7.45 MG/ML
10 INJECTION INTRAVENOUS
Status: DISPENSED | OUTPATIENT
Start: 2025-05-08 | End: 2025-05-08

## 2025-05-08 RX ORDER — MAGNESIUM SULFATE HEPTAHYDRATE 40 MG/ML
2 INJECTION, SOLUTION INTRAVENOUS ONCE
Status: COMPLETED | OUTPATIENT
Start: 2025-05-08 | End: 2025-05-08

## 2025-05-08 RX ORDER — ACETAMINOPHEN 325 MG/1
650 TABLET ORAL EVERY 4 HOURS PRN
Status: ACTIVE | OUTPATIENT
Start: 2025-05-08

## 2025-05-08 RX ORDER — HEPARIN SODIUM,PORCINE 10 UNIT/ML
5-15 VIAL (ML) INTRAVENOUS
Status: ACTIVE | OUTPATIENT
Start: 2025-05-08

## 2025-05-08 RX ORDER — HEPARIN SODIUM,PORCINE 10 UNIT/ML
5-15 VIAL (ML) INTRAVENOUS EVERY 24 HOURS
Status: ACTIVE | OUTPATIENT
Start: 2025-05-08

## 2025-05-08 RX ORDER — POTASSIUM CHLORIDE 29.8 MG/ML
20 INJECTION INTRAVENOUS
Status: DISPENSED | OUTPATIENT
Start: 2025-05-09 | End: 2025-05-09

## 2025-05-08 RX ORDER — ACETAMINOPHEN 650 MG/1
650 SUPPOSITORY RECTAL EVERY 4 HOURS PRN
Status: DISPENSED | OUTPATIENT
Start: 2025-05-08

## 2025-05-08 RX ORDER — ACETAMINOPHEN 325 MG/1
975 TABLET ORAL EVERY 4 HOURS PRN
Status: DISCONTINUED | OUTPATIENT
Start: 2025-05-08 | End: 2025-05-08

## 2025-05-08 RX ADMIN — Medication 1 LOZENGE: at 06:20

## 2025-05-08 RX ADMIN — SODIUM PHOSPHATE, MONOBASIC, MONOHYDRATE AND SODIUM PHOSPHATE, DIBASIC ANHYDROUS 9 MMOL: 142; 276 INJECTION, SOLUTION INTRAVENOUS at 09:49

## 2025-05-08 RX ADMIN — POTASSIUM CHLORIDE 10 MEQ: 7.46 INJECTION, SOLUTION INTRAVENOUS at 17:49

## 2025-05-08 RX ADMIN — SODIUM CHLORIDE, SODIUM LACTATE, POTASSIUM CHLORIDE, AND CALCIUM CHLORIDE: .6; .31; .03; .02 INJECTION, SOLUTION INTRAVENOUS at 21:32

## 2025-05-08 RX ADMIN — POTASSIUM CHLORIDE 10 MEQ: 7.46 INJECTION, SOLUTION INTRAVENOUS at 15:24

## 2025-05-08 RX ADMIN — LIDOCAINE 4% 2 PATCH: 40 PATCH TOPICAL at 21:29

## 2025-05-08 RX ADMIN — ONDANSETRON 4 MG: 2 INJECTION, SOLUTION INTRAMUSCULAR; INTRAVENOUS at 20:37

## 2025-05-08 RX ADMIN — ONDANSETRON 4 MG: 4 TABLET, ORALLY DISINTEGRATING ORAL at 10:29

## 2025-05-08 RX ADMIN — PROCHLORPERAZINE EDISYLATE 5 MG: 5 INJECTION INTRAMUSCULAR; INTRAVENOUS at 23:28

## 2025-05-08 RX ADMIN — POTASSIUM CHLORIDE 20 MEQ: 29.8 INJECTION, SOLUTION INTRAVENOUS at 23:51

## 2025-05-08 RX ADMIN — POTASSIUM CHLORIDE 10 MEQ: 7.46 INJECTION, SOLUTION INTRAVENOUS at 09:49

## 2025-05-08 RX ADMIN — METHOCARBAMOL 500 MG: 100 INJECTION, SOLUTION INTRAMUSCULAR; INTRAVENOUS at 01:24

## 2025-05-08 RX ADMIN — POTASSIUM CHLORIDE 10 MEQ: 7.46 INJECTION, SOLUTION INTRAVENOUS at 17:47

## 2025-05-08 RX ADMIN — PROCHLORPERAZINE EDISYLATE 5 MG: 5 INJECTION INTRAMUSCULAR; INTRAVENOUS at 01:25

## 2025-05-08 RX ADMIN — ENOXAPARIN SODIUM 40 MG: 40 INJECTION SUBCUTANEOUS at 09:48

## 2025-05-08 RX ADMIN — PROCHLORPERAZINE EDISYLATE 5 MG: 5 INJECTION INTRAMUSCULAR; INTRAVENOUS at 13:53

## 2025-05-08 RX ADMIN — ACETAMINOPHEN 650 MG: 650 SUPPOSITORY RECTAL at 21:30

## 2025-05-08 RX ADMIN — SODIUM CHLORIDE, SODIUM LACTATE, POTASSIUM CHLORIDE, AND CALCIUM CHLORIDE: .6; .31; .03; .02 INJECTION, SOLUTION INTRAVENOUS at 01:24

## 2025-05-08 RX ADMIN — LIDOCAINE HYDROCHLORIDE ANHYDROUS 1 ML: 10 INJECTION, SOLUTION INFILTRATION at 12:55

## 2025-05-08 RX ADMIN — Medication 1 LOZENGE: at 14:20

## 2025-05-08 RX ADMIN — MAGNESIUM SULFATE HEPTAHYDRATE 2 G: 2 INJECTION, SOLUTION INTRAVENOUS at 09:49

## 2025-05-08 RX ADMIN — PANTOPRAZOLE SODIUM 40 MG: 40 INJECTION, POWDER, FOR SOLUTION INTRAVENOUS at 09:49

## 2025-05-08 RX ADMIN — POTASSIUM CHLORIDE 10 MEQ: 7.46 INJECTION, SOLUTION INTRAVENOUS at 16:41

## 2025-05-08 RX ADMIN — POTASSIUM CHLORIDE 10 MEQ: 7.46 INJECTION, SOLUTION INTRAVENOUS at 13:58

## 2025-05-08 ASSESSMENT — ACTIVITIES OF DAILY LIVING (ADL)
ADLS_ACUITY_SCORE: 52

## 2025-05-08 NOTE — PLAN OF CARE
[id number]    Significant 24 hour events: All electrolytes replaced. Both PIVs taken out and PICC placed at bedside. Continued back pain controlled with PRN Tylenol suppository.     Level of Care: Acute    Summary Type: 5A Post Op Report   POD: #4 = 5/8  LAPAROSCOPY, DIAGNOSTIC with lysis of adhesions, converted to open laparotomy with small bowel resection  x1, N/A - Abdomen   Complications:    Pain:  Uncotrolled Pain given PRN Dilaudid, Prn robaxin, PRN tylenol supp  Neuro:WDL  CV:WDL  Resp:WDL  GI: WDL except nausea and dry heaving , PRN compazine and zofran given  :WDL -  Skin: .WDL except, color, integrityWDL except surgical site UTV  Activity Assistance: assistance, 1 person   Safety/Falls Risk: Level of Risk: High Risk  Consults: PT/OT  Procedures/Imaging:   Precautions: Isolation Precautions-MRSA contact      Goal Outcome Evaluation:      Plan of Care Reviewed With: patient    Overall Patient Progress: no changeOverall Patient Progress: no change    Outcome Evaluation: [phone]

## 2025-05-08 NOTE — DISCHARGE SUMMARY
*** Update date of service and refresh document before signing    [facility]  Discharge Summary  General Surgery     Nadira Perez MRN# [id number]   YOB: 1950 Age: 74 year old     Date of Admission:  5/4/2025  Date of Discharge::  May 8, 2025***  Admitting Physician:  Jacobo Cintron DO  Discharge Physician:  ***  Primary Care Physician:        Matilde Quiñonez          Admission Diagnoses:   Obstruction concurrent with and due to hernia of abdominal wall [K43.6]  Incarcerated ventral hernia ***          Discharge Diagnosis:   There are no discharge diagnoses documented for the most recent discharge.     Ischemic small bowel s/p resection  ***         Procedures:   ***  Procedure(s):  LAPAROSCOPY, DIAGNOSTIC with lysis of adhesions, converted to  open laparotomy with small bowel resection x1          Consultations:   SURGERY GENERAL ADULT IP CONSULT  OCCUPATIONAL THERAPY ADULT IP CONSULT  PHYSICAL THERAPY ADULT IP CONSULT  CARE MANAGEMENT / SOCIAL WORK IP CONSULT  NURSING TO CONSULT FOR VASCULAR ACCESS CARE IP CONSULT  NURSING TO CONSULT FOR VASCULAR ACCESS CARE IP CONSULT  PAIN MANAGEMENT ADULT IP CONSULT  NURSING TO CONSULT FOR VASCULAR ACCESS CARE IP CONSULT  NURSING TO CONSULT FOR VASCULAR ACCESS CARE IP CONSULT  VASCULAR ACCESS FOR PICC PLACEMENT ADULT IP CONSULT          Brief History of Illness:     The patient with a history of known ventral abdominal hernia which had not previously been problematic presented to the emergency department on 5/4 for abdominal pain, vomiting, and a painful mass in her right abdomen which she stated she has been able to reduce in the mass but could not that morning. On physical exam, she had decreased bowel sounds, a baseball-sized palpable mass in the right abdomen to the right of the umbilicus, and RLQ abdominal tenderness. The mass could not be reduced manually.   Labs showed hypokalemia, hyperchloremia, hypoalbuminemia,  and low bicarb. Required critical care for high risk electrolyte abnormality; IV K, IV Mg given.    Surgical consult placed. Pre-op diagnosis of incarcerated hernia with concern for possible strangulation.    ***           Hospital Course:   ***    The patient underwent emergent diagnostic laparoscopy converted to open laparotomy with small bowel resection on 5/4, which she tolerated well without immediate complications ***. She was transferred to the PACU and then floor for routine postoperative care. The remainder of her postoperative course was unremarkable***. Fong was removed on POD# 1. She had return of bowel function on POD#1*** and her diet was slowly advanced. On the day of discharge, she was tolerating ***regular diet, her pain was well controlled with oral pain medications, she was voiding spontaneously, and ambulating independently. Patient with follow up with *** in *** clinic in *** weeks.           Imaging Studies:     Results for orders placed or performed during the hospital encounter of 05/04/25   XR Chest Port 1 View    Narrative    EXAM: XR CHEST PORT 1 VIEW  LOCATION: [facility]  DATE: 5/4/2025    INDICATION: NG placement  COMPARISON: Chest CT 2/16/2024.      Impression    IMPRESSION: Enteric tube tip in the stomach and sidehole at the gastroesophageal junction. Recommend advancing by 5 cm. Left basilar atelectasis. No focal infiltrate. No pleural effusion. Normal heart size and pulmonary vascularity. Aortic   calcifications. Left axillary stents.   CT Abdomen Pelvis w Contrast    Narrative    EXAMINATION: CT ABDOMEN PELVIS W CONTRAST, 5/4/2025 2:27 PM    INDICATION: Incarcerated abdominal wall hernia    COMPARISON STUDY: CT 1/31/2025    TECHNIQUE: CT scan of the abdomen and pelvis was performed on  multidetector CT scanner using volumetric acquisition technique and  images were reconstructed in multiple planes with variable thickness  and reviewed  on dedicated workstations.     CONTRAST: 105ml isovue 370 IV without oral contrast    CT scan radiation dose is optimized to minimum requisite dose using  automated dose modulation techniques.    FINDINGS:    Lower thorax: Bibasilar dependent subsegmental atelectasis.    Liver: No mass. Mild intrahepatic biliary ductal dilation.    Biliary System: Normal gallbladder. No extrahepatic biliary ductal  dilation.    Pancreas: No mass or pancreatic ductal dilation.    Adrenal glands: There is diffuse thickening of the left adrenal and a  hyperdense left adrenal lesion measuring 1.8 x 3.0 cm (series 4, image  129). The right adrenal gland is unremarkable.    Spleen: Normal. Cystic lesion in the posterior spleen.    Kidneys: Multiple hyperdense lesions within the renal parenchyma, for  example a 2.1 x 2.1 cm hyperdense lesion in the right mid pole (series  4, image 153) and a 1.1 x 0.8 cm partially exophytic hypodense lesion  in the left superior pole. Nonobstructing left calyceal tip stone  measuring 5 mm.       Gastrointestinal tract: Borderline dilated loop of small bowel within  a ventral abdominal hernia is decompressed as it reenters the abdomen.  The upstream small bowel is borderline dilated measuring up to 3 cm in  diameter. There is normal enhancement of the bowel wall within the  hernia. Small volume inflammatory changes and ascites within the  hernia. Normal caliber large bowel. The appendix is normal.      Pelvis: Urinary bladder is normal. Hysterectomy. No adnexal mass.     Mesentery/peritoneum/retroperitoneum: No mass. No free air.    Lymph nodes: No significant lymphadenopathy.    Vasculature: No aneurysm of the abdominal aorta.     Soft tissues: Right abdominal wall hernia containing bowel as  described above.    Osseous structures: Postsurgical changes of L5-S1 posterior fusion.  Multilevel degenerative changes of the visualized spine. Chronic left  pedicle fracture of L2. Unchanged appearance of T11  compression  fracture with approximately 5 mm of retropulsion into the spinal  canal.       Impression    IMPRESSION:   1.  Previously fat-containing right abdominal wall hernia now contains  a small bowel loop which is borderline dilated and is decompressed as  it reenters the abdominal cavity, suggesting possible obstruction at  this site. There is normal enhancement of the bowel wall with mild  surrounding inflammatory changes and free fluid in the hernia sac.  Upstream small bowel is borderline dilated measuring up to 3cm in  diameter.  2.  Multiple hyperdense lesions within the bilateral kidneys, may  represent proteinaceous/hemorrhagic cysts. Recommend nonemergent  dedicated MRI renal mass protocol.  3.   Hyperdense left adrenal lesion measuring up to 3 cm. This also  can be evaluated on MRI abdomen.    I have personally reviewed the examination and initial interpretation  and I agree with the findings.    SULMA OLIVO MD         SYSTEM ID:  [id number]   XR Abdomen Port 1 View    Narrative    EXAMINATION:  XR ABDOMEN PORT 1 VIEW 5/5/2025     COMPARISON: CT 5/4/2025    HISTORY: eval NGT position.    TECHNIQUE: One frontal supine view of the abdomen.    FINDINGS: Gastric tube tip and sidehole project over the stomach. No  abnormally dilated air-filled loops of bowel. Bibasilar atelectasis.      Impression    IMPRESSION:   Gastric tube tip and sidehole project over the stomach.    I have personally reviewed the examination and initial interpretation  and I agree with the findings.    SULMA OLIVO MD         SYSTEM ID:  [id number]     *Note: Due to a large number of results and/or encounters for the requested time period, some results have not been displayed. A complete set of results can be found in Results Review.              Medications Prior to Admission:     Facility-Administered Medications Prior to Admission   Medication Dose Route Frequency Provider Last Rate Last Admin    romosozumab-aqqg (EVENITY)  injection 210 mg  210 mg Subcutaneous Q30 Days Cortney Clark MD   210 mg at 04/12/23 1307     Medications Prior to Admission   Medication Sig Dispense Refill Last Dose/Taking    acetaminophen (TYLENOL) 500 MG tablet Take 1,000 mg by mouth 3 times daily   5/3/2025 Evening    artificial tears OINT ophthalmic ointment Place into both eyes nightly as needed for dry eyes.   Past Month    atorvastatin (LIPITOR) 80 MG tablet Take 1 tablet (80 mg) by mouth daily. 90 tablet 3 5/3/2025 Evening    carvedilol (COREG) 12.5 MG tablet Take 1 tablet (12.5 mg) by mouth 2 times daily (with meals). 180 tablet 3 5/3/2025    CRANBERRY EXTRACT PO Take 500 mg by mouth every morning   5/3/2025    diclofenac (VOLTAREN) 1 % topical gel Apply 2 g topically 4 times daily 100 g 6 5/3/2025 Morning    gabapentin (NEURONTIN) 300 MG capsule Take 4 capsules (1,200 mg) by mouth 3 times daily. 1080 capsule 3 5/3/2025 Evening    HEMP OIL OR EXTRACT OR OTHER CBD CANNABINOID, NOT MEDICAL CANNABIS, THC   5/3/2025 Evening    hydrochlorothiazide (HYDRODIURIL) 25 MG tablet Take 1 tablet (25 mg) by mouth daily. 90 tablet 3 5/3/2025 Morning    levothyroxine (SYNTHROID/LEVOTHROID) 112 MCG tablet TAKE 1/2 TAB BY MOUTH EVERY DAY 45 tablet 3 5/3/2025    lisinopril (ZESTRIL) 20 MG tablet Take 1 tablet (20 mg) by mouth 2 times daily. 180 tablet 3 5/3/2025 Evening    MAGNESIUM GLYCINATE PO Take 800 mg by mouth at bedtime.   Taking    Melatonin 10 MG TABS tablet Take 10 mg by mouth nightly as needed.   5/3/2025 Bedtime    Multiple Vitamin (MULTI-VITAMIN) per tablet Take 1 tablet by mouth every morning   5/3/2025 Morning    omeprazole (PRILOSEC) 20 MG DR capsule Take 1 capsule (20 mg) by mouth daily. 90 capsule 3 More than a month    ondansetron (ZOFRAN) 4 MG tablet Take 1 tablet (4 mg) by mouth every 6 hours as needed for nausea. 12 tablet 3 Taking As Needed    ondansetron (ZOFRAN-ODT) 4 MG ODT tab Take 1 tablet (4 mg) by mouth every 12 hours as needed  for nausea 180 tablet 3 More than a month    [] oxyCODONE (ROXICODONE) 5 MG tablet Take 1 tablet (5 mg) by mouth 2 times daily as needed for severe pain. 10 tablet 0 2025 Morning    spironolactone (ALDACTONE) 25 MG tablet Take 1 tablet (25 mg) by mouth daily at 2 pm. (Patient taking differently: Take 50 mg by mouth daily at 2 pm.) 90 tablet 3 2025 Evening    tiZANidine (ZANAFLEX) 4 MG tablet Take 1 tablet (4 mg) by mouth at bedtime. 90 tablet 3 5/3/2025 Bedtime    Turmeric 500 MG CAPS Take 500 mg by mouth every morning                 Discharge Medications:     Current Discharge Medication List        CONTINUE these medications which have NOT CHANGED    Details   acetaminophen (TYLENOL) 500 MG tablet Take 1,000 mg by mouth 3 times daily      artificial tears OINT ophthalmic ointment Place into both eyes nightly as needed for dry eyes.      atorvastatin (LIPITOR) 80 MG tablet Take 1 tablet (80 mg) by mouth daily.  Qty: 90 tablet, Refills: 3    Associated Diagnoses: Pure hypercholesterolemia      carvedilol (COREG) 12.5 MG tablet Take 1 tablet (12.5 mg) by mouth 2 times daily (with meals).  Qty: 180 tablet, Refills: 3    Associated Diagnoses: HX: breast cancer      CRANBERRY EXTRACT PO Take 500 mg by mouth every morning      diclofenac (VOLTAREN) 1 % topical gel Apply 2 g topically 4 times daily  Qty: 100 g, Refills: 6    Associated Diagnoses: Primary osteoarthritis of both wrists      gabapentin (NEURONTIN) 300 MG capsule Take 4 capsules (1,200 mg) by mouth 3 times daily.  Qty: 1080 capsule, Refills: 3    Associated Diagnoses: Neuropathic pain      HEMP OIL OR EXTRACT OR OTHER CBD CANNABINOID, NOT MEDICAL CANNABIS, THC      hydrochlorothiazide (HYDRODIURIL) 25 MG tablet Take 1 tablet (25 mg) by mouth daily.  Qty: 90 tablet, Refills: 3    Associated Diagnoses: Benign essential hypertension      levothyroxine (SYNTHROID/LEVOTHROID) 112 MCG tablet TAKE 1/2 TAB BY MOUTH EVERY DAY  Qty: 45 tablet, Refills:  3    Associated Diagnoses: Hypothyroidism, unspecified type      lisinopril (ZESTRIL) 20 MG tablet Take 1 tablet (20 mg) by mouth 2 times daily.  Qty: 180 tablet, Refills: 3    Associated Diagnoses: Benign essential hypertension      MAGNESIUM GLYCINATE PO Take 800 mg by mouth at bedtime.      Melatonin 10 MG TABS tablet Take 10 mg by mouth nightly as needed.      Multiple Vitamin (MULTI-VITAMIN) per tablet Take 1 tablet by mouth every morning      omeprazole (PRILOSEC) 20 MG DR capsule Take 1 capsule (20 mg) by mouth daily.  Qty: 90 capsule, Refills: 3    Associated Diagnoses: Gastric reflux      ondansetron (ZOFRAN) 4 MG tablet Take 1 tablet (4 mg) by mouth every 6 hours as needed for nausea.  Qty: 12 tablet, Refills: 3    Associated Diagnoses: Congenital abnormality of ear      ondansetron (ZOFRAN-ODT) 4 MG ODT tab Take 1 tablet (4 mg) by mouth every 12 hours as needed for nausea  Qty: 180 tablet, Refills: 3    Associated Diagnoses: Vertigo      spironolactone (ALDACTONE) 25 MG tablet Take 1 tablet (25 mg) by mouth daily at 2 pm.  Qty: 90 tablet, Refills: 3    Associated Diagnoses: HX: breast cancer; Benign essential hypertension      tiZANidine (ZANAFLEX) 4 MG tablet Take 1 tablet (4 mg) by mouth at bedtime.  Qty: 90 tablet, Refills: 3    Associated Diagnoses: Sprain of interphalangeal joint of left great toe, initial encounter      Turmeric 500 MG CAPS Take 500 mg by mouth every morning           STOP taking these medications       oxyCODONE (ROXICODONE) 5 MG tablet Comments:   Reason for Stopping:                       Medications Discontinued or Adjusted During This Hospitalization:   {:523165}           Antibiotics Prescribed at Discharge:   {:631697}           Day of Discharge Physical Exam:   Temp:  [98  F (36.7  C)-99.8  F (37.7  C)] 98.8  F (37.1  C)  Pulse:  [85-88] 88  Resp:  [18-20] 20  BP: (146-169)/() 159/99  SpO2:  [97 %-98 %] 97 %    General: awake, alert, no acute distress, laying  "comfortably in bed   CV: warm, well perfused   Pulm: breathing comfortably on room air   Abdomen: soft, non-distended, appropriately tender, no rebound or guarding;  Incision***   Extremities: no edema, moving all extremities spontaneously and without apparent deficit            Final Pathology Result:   ***           Discharge Instructions and Follow-Up:   No discharge procedures on file.           Home Health Care:     {:773277}           Discharge Disposition:     { :[id number]::\"Discharged to home\"}      Condition at discharge: {:232958}    Patient was discussed with staff,  ***, on the day of discharge.    Dong Todd MD  Surgery Resident     " Specific Question Answer Comments   Is discharge order? Yes      Discharge Instructions   Order Comments: Discharge Instructions  Activity  - No strenuous exercise for 3-4 weeks  - No lifting, pushing, pulling more than 15-20 pounds for 3-4 weeks  - Do not strain with bowel movements  - Do not drive until you can press the brake pedal quickly and fully without pain  - Do not operate a motor vehicle while taking narcotic pain medications    Incisions  - The type of wound closure used for your incision:  Staples    Drain Care  - You do not have a drain.  Specific care/instructions:  Not Applicable    Medications  - Do not take any additional Tylenol (acetaminophen) while using a narcotic pain medication which includes acetaminophen  - Do not take more than 4,000mg of acetaminophen in any 24 hour period, as this can cause liver damage  - Take stool softeners such as Senna or Miralax while you are using narcotics, but stop if you develop diarrhea  - Wean yourself off of narcotic pain medications  - You will be discharged with Zofran, an anti-emetic medication. Use if feeling nauseated for a prolonged period of time.    Follow-Up:  - Call your primary care provider to touch base regarding your recent admission.    - Call or return sooner than your regularly scheduled visit if you develop any of the following: fever >101.5, uncontrolled pain, uncontrolled nausea or vomiting, as well as increased redness, swelling, or drainage from your wound.   -  A nurse from the General Surgery Clinic will contact you 24 hours, or next business day, after your discharge from the hospital.  If you have questions or to schedule a follow up appointment please call the General Surgery Clinic at [phone].  Call [phone] and ask to speak with the Surgery resident on call if you are having troubles in the evenings, at night, or on the weekend.  -  If you are receiving home care please inform your home care nurse of our contact number.      ADULT [facility] Specialty Follow-up and recommended labs and tests   Order Comments: Follow up: Follow up in general surgery clinic. Clinic will call to help schedule an appointment.    Appointments on [place] and/or [facility] (with [facility] or [facility] provider or service). Call [phone] if you haven't heard regarding these appointments within 7 days of discharge.     Diet   Order Comments: Follow this diet upon discharge: Current Diet:Orders Placed This Encounter      Snacks/Supplements Adult: Ensure Enlive; With Meals      Regular Diet Adult     Order Specific Question Answer Comments   Is discharge order? Yes      Hospital Follow-up with Existing Primary Care Provider (PCP)   Standing Status: Future Standing Exp. Date: 06/12/25   Order Comments:       Order Specific Question Answer Comments   Schedule Primary Care visit within 30 Days           Home Health Care:     Not needed           Discharge Disposition:     Discharged to home      Condition at discharge: Stable    Patient was discussed with staff, Dr. Gimenez, on the day of discharge.    Edward Brewer MD  Urology PGY-1  Surgery Resident

## 2025-05-08 NOTE — TELEPHONE ENCOUNTER
Left message for patient to return call to [phone].    In preparation for clinic next week, noted that this pt is currently in the hospital s/p surgery for incarcerated hernia on 5/4/25. Need to know if she would like to cancel/postpone surgery with Dr Agudelo related to her current medical issues.

## 2025-05-08 NOTE — TELEPHONE ENCOUNTER
Pt called back, wants to leave things as they are scheduled, wants the surgery as scheduled if possible. We will see her in clinic next week and discuss.

## 2025-05-08 NOTE — PLAN OF CARE
23:00- 07:00  Goal Outcome Evaluation: pain controlled      Plan of Care Reviewed With: patient    Overall Patient Progress: no change     POD: #4 = 5/8   Pain:  Controlled  with lidocaine patch, PRN dilaudid x1, robaxin x1, tylenol supp x1 with relief.   Neuro:WDL  CV:WDL  Resp:WDL, denies SOB  GI: WDL except nausea and dry heaving, PRN Zofran and Compazine given to help manage. With NG to LIS.  :WDL, voiding spontaneously w/ LOMAS  Skin: .WDL except integrity- w/ large hematoma on L lower abdomen, R abdominal incision CONNIE, closed with staples  Activity Assistance: assistance, 1 person - w/ walker, put R leg brace when walking  Safety/Falls Risk: Level of Risk: High Risk  Consults: PT/OT  Precautions: Isolation Precautions-MRSA contact        AOx4, hypertensive but not within parameters for PRN labetalol. OVSS on RA. LR running at 100ml/hr via PIV. Strict NPO. NG to LIS. Patient c/o sore throat, provider made aware, PRN Chloraseptic max given . Continue w/ POC.

## 2025-05-08 NOTE — PROGRESS NOTES
Vascular Access Services Notes:     PICC/Midline to be placed today as ordered. Primary RN to assure that patient have had Chlorhexidine Gluconate (CHG) bath & linen change prior to procedure. RN to contact VAS once done.         David Mitchell, KAVINN, RN VA-BC  Vascular Access Services  [facility]  [phone]

## 2025-05-08 NOTE — PROCEDURES
[facility]    Double Lumen PICC Placement    Date/Time: 5/8/2025 1:00 PM    Performed by: David Mitchell RN  Authorized by: Edward Brewer MD  Indications: vascular access      UNIVERSAL PROTOCOL   Site Marked: Yes  Prior Images Obtained and Reviewed:  Yes  Required items: Required blood products, implants, devices and special equipment available    Patient identity confirmed:  Verbally with patient, hospital-assigned identification number, arm band and provided demographic data  Patient was reevaluated immediately before administering moderate or deep sedation or anesthesia  Confirmation Checklist:  Patient's identity using two indicators, procedure was appropriate and matched the consent or emergent situation, correct equipment/implants were available and relevant allergies  Time out: Immediately prior to the procedure a time out was called    Universal Protocol: the [facility] Universal Protocol was followed    Preparation: Patient was prepped and draped in usual sterile fashion       ANESTHESIA    Anesthesia:  See MAR for details  Local Anesthetic:  Lidocaine 1% without epinephrine  Anesthetic Total (mL):  1      SEDATION    Patient Sedated: No        Preparation: skin prepped with ChloraPrep  Skin prep agent: skin prep agent completely dried prior to procedure  Sterile barriers: maximum sterile barriers were used: cap, mask, sterile gown, sterile gloves, and large sterile sheet  Hand hygiene: hand hygiene performed prior to central venous catheter insertion  Type of line used: PICC  Catheter type: double lumen  Lumen type: non-valved and power PICC  Lumen Identification: Purple and Red  Catheter size: 5 Fr  Brand: Bard  Lot number: [id number]  Placement method: venipuncture, MST, ultrasound and tip navigation system  Number of attempts: 1  Difficulty threading catheter: no  Successful placement: yes  Orientation: right  Catheter to Vein (%):  36  Location: basilic vein  Tip Location: SVC  Arm circumference: adults 10 cm  Extremity circumference: 26.5  Visible catheter length: 1  Total catheter length: 38  Dressing and securement: transparent dressing, sterile dressing applied, statlock and site cleansed  Post procedure assessment: blood return through all ports, free fluid flow and placement verified by 3CG technology  PROCEDURE Describe Procedure: Patient tolerated well and denied pain immediately after procedure.  PICC tip is in satisfactory location as verified by [employer] 3CG Tip Confirmation System. PICC is OK to use.  Disposal: sharps and needle count correct at the end of procedure, needles and guidewire disposed in sharps container  Patient Tolerance:  Patient tolerated the procedure well with no immediate complications

## 2025-05-08 NOTE — PROGRESS NOTES
Emergency General Surgery Progress Note  5/8/2025      Subjective:   Overnight, patient requesting pain medication adjuncts. Overnight team added rectal tylenol, encouraged IV robaxin, and added lidocaine patches. This AM, patient reports some improvement in pain control. Continues to be nauseous intermittently and has been taking Zofran as needed. NGT remains in place, bilious output. Has been out of bed. Voiding. Passing some gas, but reports passing less gas than previous day. Still no bowel movements.     Objective:   Temp:  [98  F (36.7  C)-99.8  F (37.7  C)] 99.8  F (37.7  C)  Pulse:  [84] 84  Resp:  [18] 18  BP: (136-169)/() 146/92  SpO2:  [94 %-98 %] 98 %  I/O last 3 completed shifts:  In: 1700 [I.V.:1700]  Out: 1850 [Emesis/NG output:1850]      Gen: A&O x3, in NAD. NGT in place  CV: RRR. Pulses 2+  Pulm: Nonlabored breathing on RA  Abd: Soft, appropriately tender, not distended. Midline incision closed with staples, well approximated, no drainage expressed or surrounding erythema. Laparoscopic incisions clean, dry, intact but with unchanged surrounding ecchymosis. Remains soft, not tender to palpation.   Extremities: warm and well perfused, moves all extremities spontaneously. No peripheral edema  Neuro: CN II-XII grossly intact, no focal deficits observed       Labs:  BMP   Recent Labs   Lab 05/07/25  0609 05/06/25  1935 05/06/25  0611 05/06/25  0609 05/05/25  0623 05/05/25  0533 05/04/25  2224 05/04/25  1917 05/04/25  1631 05/04/25  1455 05/04/25  1212     --  139  --   --  141  --   --  144  --  143   POTASSIUM 3.5 3.7 3.4  --   --  3.5  --   --  3.1*  --  2.2*   CHLORIDE 103  --  103  --   --  104  --   --   --   --  114*   RAMBO 8.2*  --  8.5*  --   --  8.5*  --   --   --   --  6.3*   CO2 21*  --  22  --   --  23  --   --   --   --  18*   BUN 12.9  --  20.6  --   --  20.0  --   --   --   --  18.1   CR 0.66  --  0.81  --   --  0.84 0.84  --   --   --  0.60   GLC 81  --  95 92 100* 109*  --     < > 97   < > 98   MAG 1.8  --  2.1  --   --  2.1  --   --   --   --  1.4*   PHOS 2.1* 2.0* 1.9*  --   --  3.0  --   --   --   --   --     < > = values in this interval not displayed.     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 Imaging demonstrated incarcerated ventral hernia, prompting EGS involvement. On 5/4, patient underwent diagnostic laparoscopy converted to laparotomy with lysis of adhesions, small bowel resection, and primary closure of ventral hernia (no mesh).    Patient continues to have bilious output from NGT. Maintain NGT, will re-evaluate later this PM but anticipate NGT will remain in place for at least another 24 hours. Communicated with patient.       Today's Changes (5/8/2025):   - NPO, LR @ 100 mL/hr   - Maintain NGT to LIS, okay to clamp with medications   - Pain Control: PO Tylenol, Rectal Tylenol q4h PRN, IV Dilaudid q2h PRN, IV Robaxin 500mg q8h PRN, Lidocaine patches.  - Flush NGT qshift: flush blue port with air only and then flush clear port with 30cc of saline qshift  - holding PO levothyroxine given NGT in place   - IV PPI ordered given PTA PPI   - Holding PTA anti-hypertensive medications, will have IV labetalol and hydralazine available PRN for elevated SBP >160.   - IS at bedside, encourage IS  - OOB, ambulate       This patient was seen and discussed with chief resident who  discussed with staff    Mireya Roberto MD  General Surgery, PGY-1

## 2025-05-08 NOTE — PROGRESS NOTES
"CLINICAL NUTRITION SERVICES - ASSESSMENT NOTE    RECOMMENDATIONS FOR MDs/PROVIDERS TO ORDER:  Diet adv v nutrition support within 3 days.    Registered Dietitian Interventions:  None at this time     Future/Additional Recommendations:  -- Monitor diet adv per provider, PO intakes, wt trends.    -- If plan to begin TPN, please send consult Pharmacy/Nutrition to start and manage TPN.  Would recommend the following at this time:  Dosing weight:  61 kg  Access: Central (if available)  Initial parameters (per day)  Volume:  1680 mL (or other per provider)  Dextrose: 120 g  AA: 90 g  Lipids: 250 mL 20%, 5 days per week   Dextrose titration: Monitor lytes and if within acceptable parameters (Mg++ > or = 1.5, K+ is > or = 3, and PO4 > or = 1.9), increase dextrose by 60 g/day to goal of 240 g dextrose.  Additives: Infuvite and trace minerals daily, or per pharmacist    Goal PN provides 240 g dextrose, 90 g AA, and 250 mL 20% lipids 5 days per week for total provision of 1533 Kcals (25 Kcals/kg), 1.5 g/kg protein, GIR 2.7 mg/kg/minute, and 23% fat kcals on average daily.       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 Imaging demonstrated incarcerated ventral hernia, prompting EGS involvement. On 5/4, patient underwent diagnostic laparoscopy converted to laparotomy with lysis of adhesions, small bowel resection, and primary closure of ventral hernia (no mesh).\"    CURRENT NUTRITION ORDERS  Diet: NPO    CURRENT INTAKE/TOLERANCE  NPO x 4 days since 5/4.  NG to LIS currently.    LABS  Nutrition-relevant labs: Reviewed  - K+ 2.9 (L), replacement ordered per MAR  - Phos 2 (L), replacement " "ordered per MAR    MEDICATIONS  Nutrition-relevant medications: Reviewed  - LR @ 100 mL/hr    ANTHROPOMETRICS  Height: 165.1 cm (5' 5\")  Admission Weight: 72.2 kg (159 lb 1.6 oz) (05/05/25 0021)   Most Recent Weight: 74.3 kg (163 lb 11.2 oz) (05/05/25 1059)  IBW: 56.8 kg   BMI: Body mass index is 27.24 kg/m .   Weight History: 26 lb wt loss the past 11 months (14%), 10 lb of this weight loss the past 4 months (5.7%)  Wt Readings from Last 20 Encounters:   05/05/25 74.3 kg (163 lb 11.2 oz)   02/26/25 77.6 kg (171 lb)   01/07/25 78.5 kg (173 lb)   01/06/25 78 kg (172 lb)   11/07/24 79.2 kg (174 lb 11.2 oz)   10/15/24 78.9 kg (174 lb)   10/02/24 79 kg (174 lb 3.2 oz)   09/10/24 79.7 kg (175 lb 11.2 oz)   09/06/24 79.7 kg (175 lb 11.2 oz)   08/16/24 81.5 kg (179 lb 9.6 oz)   07/25/24 82.6 kg (182 lb)   07/23/24 81.6 kg (180 lb)   07/09/24 83.9 kg (185 lb)   07/08/24 83 kg (182 lb 14.4 oz)   06/10/24 86 kg (189 lb 8 oz)   06/03/24 84.8 kg (187 lb)   05/22/24 85 kg (187 lb 6.4 oz)   05/17/24 86.2 kg (190 lb)   04/01/24 85.7 kg (189 lb)   03/13/24 86.1 kg (189 lb 14.4 oz)      Dosing Weight: 61 kg, based on adjusted wt     ASSESSED NUTRITION NEEDS  Estimated Energy Needs: 4838-6523 kcals/day (25 - 30 kcals/kg)  Justification: Maintenance  Estimated Protein Needs: 73-92 grams protein/day (1.2 - 1.5 grams of pro/kg)  Justification: Post-op  Estimated Fluid Needs: (1 mL/kcal)  Justification: Maintenance, or other per provider pending fluid status    SYSTEM AND PHYSICAL FINDINGS    GI symptoms: Reviewed  Skin/wounds: Reviewed    MALNUTRITION  % Intake: Unable to assess  % Weight Loss: Weight loss does not meet criteria   Subcutaneous Fat Loss: Unable to assess  Muscle Loss: Unable to assess  Fluid Accumulation/Edema: None noted per chart review today  Malnutrition Diagnosis: Unable to determine due to pt with other cares  Malnutrition Present on Admission: Unable to assess    NUTRITION DIAGNOSIS  Inadequate oral intake related " to NPO and NG to LIS as evidenced by NPO x 4 days    INTERVENTIONS  Chart review, pt with other cares during attempts to visit    GOALS  Diet adv v nutrition support within 3 days.     MONITORING/EVALUATION  Progress toward goals will be monitored and evaluated per policy.   Alexa Mendieta RD, , LD  Weekday Units covered: 5A (non-[facility] Onc pts) and 7B ([id number])  Available by 5A or 7B Clinical Dietitiivette Scott  Weekend Coverage: Weekend Clinical Dietitian Sonia    Inpatient Clinical Dietitians no longer available via paging

## 2025-05-08 NOTE — PROVIDER NOTIFICATION
05/08/25 1300   PICC 05/08/25 Right Brachial vein lateral   Placement Date/Time: 05/08/25 1300   Orientation: Right  Vein : Brachial vein lateral   Site Assessment WDL   External Cath Length (cm) (S)  1 cm   Extremity Circumference (cm) 26.5 cm   Dressing Chlorhexidine disk;Transparent;Securement device   Dressing Status clean;dry;intact   Dressing Change Due (S)  05/15/25   Phlebitis Scale 0-->no symptoms   Infiltration? no   PICC Comment (S)  PICC is OK to use

## 2025-05-09 ENCOUNTER — APPOINTMENT (OUTPATIENT)
Dept: PHYSICAL THERAPY | Facility: CLINIC | Age: 75
End: 2025-05-09
Payer: COMMERCIAL

## 2025-05-09 LAB
ANION GAP SERPL CALCULATED.3IONS-SCNC: 16 MMOL/L (ref 7–15)
BUN SERPL-MCNC: 12.7 MG/DL (ref 8–23)
CALCIUM SERPL-MCNC: 8.3 MG/DL (ref 8.8–10.4)
CHLORIDE SERPL-SCNC: 104 MMOL/L (ref 98–107)
CREAT SERPL-MCNC: 0.59 MG/DL (ref 0.51–0.95)
EGFRCR SERPLBLD CKD-EPI 2021: >90 ML/MIN/1.73M2
GLUCOSE SERPL-MCNC: 88 MG/DL (ref 70–99)
HCO3 SERPL-SCNC: 21 MMOL/L (ref 22–29)
MAGNESIUM SERPL-MCNC: 2.1 MG/DL (ref 1.7–2.3)
PHOSPHATE SERPL-MCNC: 1.8 MG/DL (ref 2.5–4.5)
PHOSPHATE SERPL-MCNC: 2.1 MG/DL (ref 2.5–4.5)
POTASSIUM SERPL-SCNC: 3.6 MMOL/L (ref 3.4–5.3)
POTASSIUM SERPL-SCNC: 3.7 MMOL/L (ref 3.4–5.3)
SODIUM SERPL-SCNC: 141 MMOL/L (ref 135–145)

## 2025-05-09 PROCEDURE — 250N000013 HC RX MED GY IP 250 OP 250 PS 637: Performed by: STUDENT IN AN ORGANIZED HEALTH CARE EDUCATION/TRAINING PROGRAM

## 2025-05-09 PROCEDURE — 258N000003 HC RX IP 258 OP 636: Performed by: SURGERY

## 2025-05-09 PROCEDURE — 97530 THERAPEUTIC ACTIVITIES: CPT | Mod: GP

## 2025-05-09 PROCEDURE — 120N000002 HC R&B MED SURG/OB UMMC

## 2025-05-09 PROCEDURE — 250N000009 HC RX 250

## 2025-05-09 PROCEDURE — 83735 ASSAY OF MAGNESIUM: CPT | Performed by: SURGERY

## 2025-05-09 PROCEDURE — 250N000013 HC RX MED GY IP 250 OP 250 PS 637

## 2025-05-09 PROCEDURE — 250N000011 HC RX IP 250 OP 636

## 2025-05-09 PROCEDURE — 84132 ASSAY OF SERUM POTASSIUM: CPT | Performed by: DERMATOLOGY

## 2025-05-09 PROCEDURE — 250N000011 HC RX IP 250 OP 636: Performed by: STUDENT IN AN ORGANIZED HEALTH CARE EDUCATION/TRAINING PROGRAM

## 2025-05-09 PROCEDURE — 80048 BASIC METABOLIC PNL TOTAL CA: CPT

## 2025-05-09 PROCEDURE — 250N000011 HC RX IP 250 OP 636: Performed by: DERMATOLOGY

## 2025-05-09 PROCEDURE — 258N000003 HC RX IP 258 OP 636: Performed by: STUDENT IN AN ORGANIZED HEALTH CARE EDUCATION/TRAINING PROGRAM

## 2025-05-09 PROCEDURE — 84100 ASSAY OF PHOSPHORUS: CPT | Performed by: SURGERY

## 2025-05-09 PROCEDURE — 84100 ASSAY OF PHOSPHORUS: CPT

## 2025-05-09 PROCEDURE — 258N000003 HC RX IP 258 OP 636

## 2025-05-09 RX ORDER — OXYCODONE HYDROCHLORIDE 5 MG/1
5 TABLET ORAL EVERY 4 HOURS PRN
Refills: 0 | Status: DISCONTINUED | OUTPATIENT
Start: 2025-05-09 | End: 2025-05-09

## 2025-05-09 RX ORDER — POTASSIUM PHOS IN 0.9 % NACL 15MMOL/250
15 PLASTIC BAG, INJECTION (ML) INTRAVENOUS ONCE
Status: COMPLETED | OUTPATIENT
Start: 2025-05-09 | End: 2025-05-09

## 2025-05-09 RX ORDER — DEXTROSE MONOHYDRATE, SODIUM CHLORIDE, AND POTASSIUM CHLORIDE 50; 1.49; 4.5 G/1000ML; G/1000ML; G/1000ML
INJECTION, SOLUTION INTRAVENOUS CONTINUOUS
Status: DISCONTINUED | OUTPATIENT
Start: 2025-05-09 | End: 2025-05-13 | Stop reason: HOSPADM

## 2025-05-09 RX ORDER — POTASSIUM PHOS IN 0.9 % NACL 15MMOL/250
15 PLASTIC BAG, INJECTION (ML) INTRAVENOUS ONCE
Status: COMPLETED | OUTPATIENT
Start: 2025-05-09 | End: 2025-05-10

## 2025-05-09 RX ORDER — OXYCODONE HYDROCHLORIDE 5 MG/1
5 TABLET ORAL EVERY 6 HOURS PRN
Refills: 0 | Status: DISCONTINUED | OUTPATIENT
Start: 2025-05-09 | End: 2025-05-13 | Stop reason: HOSPADM

## 2025-05-09 RX ADMIN — LISINOPRIL 20 MG: 20 TABLET ORAL at 20:57

## 2025-05-09 RX ADMIN — Medication 5 ML: at 14:33

## 2025-05-09 RX ADMIN — POTASSIUM PHOSPHATE, MONOBASIC POTASSIUM PHOSPHATE, DIBASIC 15 MMOL: 224; 236 INJECTION, SOLUTION, CONCENTRATE INTRAVENOUS at 21:00

## 2025-05-09 RX ADMIN — ACETAMINOPHEN 650 MG: 325 TABLET ORAL at 10:21

## 2025-05-09 RX ADMIN — ONDANSETRON 4 MG: 4 TABLET, ORALLY DISINTEGRATING ORAL at 14:32

## 2025-05-09 RX ADMIN — POTASSIUM CHLORIDE 20 MEQ: 29.8 INJECTION, SOLUTION INTRAVENOUS at 01:25

## 2025-05-09 RX ADMIN — ACETAMINOPHEN 650 MG: 650 SUPPOSITORY RECTAL at 05:11

## 2025-05-09 RX ADMIN — LABETALOL HYDROCHLORIDE 10 MG: 5 INJECTION, SOLUTION INTRAVENOUS at 04:59

## 2025-05-09 RX ADMIN — LISINOPRIL 20 MG: 20 TABLET ORAL at 10:00

## 2025-05-09 RX ADMIN — SODIUM CHLORIDE, SODIUM LACTATE, POTASSIUM CHLORIDE, AND CALCIUM CHLORIDE: .6; .31; .03; .02 INJECTION, SOLUTION INTRAVENOUS at 08:15

## 2025-05-09 RX ADMIN — CARVEDILOL 12.5 MG: 3.12 TABLET, FILM COATED ORAL at 18:25

## 2025-05-09 RX ADMIN — POTASSIUM PHOSPHATE, MONOBASIC POTASSIUM PHOSPHATE, DIBASIC 15 MMOL: 224; 236 INJECTION, SOLUTION, CONCENTRATE INTRAVENOUS at 06:05

## 2025-05-09 RX ADMIN — Medication 56 MCG: at 10:00

## 2025-05-09 RX ADMIN — LIDOCAINE 4% 2 PATCH: 40 PATCH TOPICAL at 21:10

## 2025-05-09 RX ADMIN — ACETAMINOPHEN 650 MG: 325 TABLET ORAL at 14:32

## 2025-05-09 RX ADMIN — ENOXAPARIN SODIUM 40 MG: 40 INJECTION SUBCUTANEOUS at 10:00

## 2025-05-09 RX ADMIN — ACETAMINOPHEN 650 MG: 325 TABLET ORAL at 18:25

## 2025-05-09 RX ADMIN — Medication 1 LOZENGE: at 05:11

## 2025-05-09 RX ADMIN — POTASSIUM CHLORIDE, DEXTROSE MONOHYDRATE AND SODIUM CHLORIDE: 150; 5; 450 INJECTION, SOLUTION INTRAVENOUS at 17:14

## 2025-05-09 RX ADMIN — ONDANSETRON 4 MG: 2 INJECTION, SOLUTION INTRAMUSCULAR; INTRAVENOUS at 08:19

## 2025-05-09 RX ADMIN — PANTOPRAZOLE SODIUM 40 MG: 40 INJECTION, POWDER, FOR SOLUTION INTRAVENOUS at 10:00

## 2025-05-09 RX ADMIN — OXYCODONE HYDROCHLORIDE 5 MG: 5 TABLET ORAL at 20:57

## 2025-05-09 RX ADMIN — ACETAMINOPHEN 650 MG: 325 TABLET ORAL at 22:27

## 2025-05-09 ASSESSMENT — ACTIVITIES OF DAILY LIVING (ADL)
ADLS_ACUITY_SCORE: 52
ADLS_ACUITY_SCORE: 50
ADLS_ACUITY_SCORE: 52
ADLS_ACUITY_SCORE: 52
ADLS_ACUITY_SCORE: 50
ADLS_ACUITY_SCORE: 52
ADLS_ACUITY_SCORE: 50
ADLS_ACUITY_SCORE: 52
ADLS_ACUITY_SCORE: 52
ADLS_ACUITY_SCORE: 50
ADLS_ACUITY_SCORE: 50
ADLS_ACUITY_SCORE: 52

## 2025-05-09 NOTE — PLAN OF CARE
19:00- 07:00  Goal Outcome Evaluation:    Plan of Care Reviewed With: patient    Overall Patient Progress: no change     Significant 24 hour events: Continued back pain controlled with PRN Tylenol suppository and Lidocaine patch. Potassium replaced.    Summary Type: 5A Post Op Report   POD: #5 = 5/9  LAPAROSCOPY, DIAGNOSTIC with lysis of adhesions, converted to open laparotomy with small bowel resection  x1  Pain:  Controlled Pain managed by PRN tylenol supp x2  and Lidocaine patch  Neuro:WDL  CV:WDL  Resp:WDL  GI: WDL except nausea and dry heaving , PRN compazine and zofran given  :WDL -voiding spontaneously w/ good UO  Skin: WDL except, integrity-w/ large hematoma on L lower abdomen, R abdominal incision CONNIE, closed with staples   Activity Assistance: assistance, 1 person- w/ walker put R leg brace when walking  Safety/Falls Risk: Level of Risk: High Risk  Consults: PT/OT  Precautions: Isolation Precautions-MRSA contact    Hypertensive labetalol given once, OVSS on RA. With LR running at 100ml/hr via PICC. Potassium and phosphate replaced. Continue w/ POC.

## 2025-05-09 NOTE — PROGRESS NOTES
Care Management Follow Up    Length of Stay (days): 5    Expected Discharge Date: 5/11/25     Concerns to be Addressed: discharge planning     Patient plan of care discussed at interdisciplinary rounds: Yes    Anticipated Discharge Disposition: Home, Home Care    Anticipated Discharge Services: Home Care  Anticipated Discharge DME: None    Patient/family educated on Medicare website which has current facility and service quality ratings: yes  Education Provided on the Discharge Plan:  YES  Patient/Family in Agreement with the Plan:  YES    Referrals Placed by CM/SW: Internal Clinic Care Coordination, Homecare  Private pay costs discussed: not at this encounter    Discussed  Partnership in Safe Discharge Planning  document with patient/family: No     Handoff Completed: Yes, [facility] PCP: Internal handoff referral completed    Additional Information:  RNCC met with patient at bedside and informed of accepting home care agency for PT.   Patient stated she will be staying at her brother's home. RNCC updated [facility] with delay and address.    Per chart review there is mention of possibly starting TPN. Pt had NG out and will need to be able to have oral intake. Discharge is pending nutrition.     [facility] (PT)  [address] [place] [address], [address]  [facility] [id number] [phone]    Discharge Address:   Branden Perez  [address] [place]  [address]    Next Steps:   []Confirm ZA with primary team  [] Update [facility]  []Confirm Home care orders (PT) and fax  [] Give IMM  Family to transport  IHO done    Jessa Dennis RNCC Float  5/9/2025  Nurse Coordinator    Covering for 5A  Phone [phone]      Social Work and Care Management Department   SEARCHABLE in [facility] - search CARE COORDINATOR   [facility] [phone]) Saturday & Sunday; [phone]) [facility] Recognized Holidays   Units: 5A Onc 5201 - 5250 RNCC,  5A Onc 5220 thru 5240 RNCC, 5C OFFSERVICE [phone] RNCC &  5C OFF SERVICE [phone] RNCC   Units: 6B Vocera, 6C Card 6401 thru 6420 RNCC, 6C Card 6502 thru 6514 RNCC & 6C Card 6515 thru 6519 RNCC    Units: 7A SOT RNCC Vocera, 7B Med Surg Vocera, 7C Med Surg 7401 thru 7418 RNCC & 7C Med Surg 7502 thru 7521 RNCC   Units: 6A Vocera & 4A CVICU Vocera, 4C MICU Vocera, and 4E SICU Vocera     Units: 5 Ortho Vocera & 5 Med Surg Vocera    Units: 6 Med Surg Vocera & 8 Med Surg Vocera      Jessa Dennis

## 2025-05-09 NOTE — PLAN OF CARE
"[id number]    Significant 24 hour events: NG tube removed, oral meds tolerated. Pt Nausea seems to be gone with NG removal. Sips + Chips as tolerated. Pt seems to be intermittently confused/forgetful with medications/requests. Stating that pain seems adequately controlled one moment, and \"out of control\" the next.     Level of Care: Acute    Summary Type: 5A Post Op Report   POD: #5 = 5/9  LAPAROSCOPY, DIAGNOSTIC with lysis of adhesions, converted to open laparotomy with small bowel resection  x1, N/A - Abdomen   Complications:    Pain:  Controlled Pain managed by PRN tylenol & Lidocaine patch  Neuro:WDL  CV:WDL  Resp:WDL  GI: WDL except nausea and dry heaving (before NG removal - PRN zofran given) Gas +, BM -  :WDL -  Skin: .WDL except, integrityWDL except surgical site & large L abd side hematoma  Activity Assistance: assistance, 1 person   Safety/Falls Risk: Level of Risk: High Risk  Consults: PT/OT  Procedures/Imaging:   Precautions: Isolation Precautions-MRSA contact    Goal Outcome Evaluation:      Plan of Care Reviewed With: patient    Overall Patient Progress: improvingOverall Patient Progress: improving    Outcome Evaluation: NG removed this am and able to tolerate pills w/o nausea          "

## 2025-05-09 NOTE — PROGRESS NOTES
Emergency General Surgery Progress Note  5/9/2025      Subjective:   No acute overnight events. Patient reports feeling better this morning. Less nauseous. No emesis. Reports abdominal pain less compared to previous days. Has found pain regimen helpful. Continuing to pass flatus. No bowel movements yet. Voiding appropriately. Ambulating well. No acute concerns per patient     Objective:   Temp:  [98.4  F (36.9  C)-99.3  F (37.4  C)] 98.4  F (36.9  C)  Pulse:  [69-92] 69  Resp:  [18-20] 18  BP: (141-179)/() 149/98  SpO2:  [96 %-98 %] 98 %  I/O last 3 completed shifts:  In: 1875 [I.V.:975; IV Piggyback:900]  Out: 2300 [Emesis/NG output:2300]      Gen: A&O x3, in NAD. NGT in place  CV: RRR. Pulses 2+  Pulm: Nonlabored breathing on RA  Abd: Soft, appropriately tender, not distended. Midline incision closed with staples, well approximated, no drainage expressed or surrounding erythema. Laparoscopic incisions clean, dry, intact with unchanged ecchymosis. Remains soft, not tender to palpation.   Extremities: warm and well perfused, moves all extremities spontaneously. No peripheral edema  Neuro: CN II-XII grossly intact, no focal deficits observed       Labs:  BMP   Recent Labs   Lab 05/09/25  0450 05/08/25  2142 05/08/25  0627 05/07/25  0609 05/06/25  1935 05/06/25  0611 05/06/25  0609 05/05/25  0623 05/05/25  0533     --   --  139  --  139  --   --  141   POTASSIUM 3.7  3.6 3.2* 2.9* 3.5 3.7 3.4  --   --  3.5   CHLORIDE 104  --   --  103  --  103  --   --  104   RAMBO 8.3*  --   --  8.2*  --  8.5*  --   --  8.5*   CO2 21*  --   --  21*  --  22  --   --  23   BUN 12.7  --   --  12.9  --  20.6  --   --  20.0   CR 0.59  --   --  0.66  --  0.81  --   --  0.84   GLC 88  --   --  81  --  95 92   < > 109*   MAG 2.1  --  2.0 1.8  --  2.1  --   --  2.1   PHOS 1.8*  --  2.0* 2.1* 2.0* 1.9*  --   --  3.0    < > = values in this interval not displayed.     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 Imaging demonstrated incarcerated ventral hernia, prompting EGS involvement. On 5/4, patient underwent diagnostic laparoscopy converted to laparotomy with lysis of adhesions, small bowel resection, and primary closure of ventral hernia (no mesh).     Post-operatively, although patient with some return of bowel function (passing flatus), continued to have high NGT output and nausea. Nausea improving, NGT output no longer bilious as of 5/9 AM. Will remove NGT and limit diet to ice chips and sips of clears today. Patient agreeable to replace NGT if needed at later date. Maintain PICC at present given limited venous access and concern that if patient does not tolerate NGT removal, may require TPN initiation as today is POD #5.       Today's Changes (5/9/2025):   - Remove NGT this AM.   - EGS team to check on patient this PM to ensure no emesis. If issues with nausea/emesis, please page first call EGS and plan for KUB and potential NGT replacement.   - NPO, meds, ice chips and sips okay.   - LR @ 100 mL/hr until adequate PO intake   - Pain Control: PO Tylenol or Rectal Tylenol q4h PRN, IV Robaxin 500mg q8h PRN, PO oxycodone 5mg q6h PRN. Lidocaine patches. Discontinued IV dilaudid as patient has not received in 36 hours.   - Flush NGT qshift: flush blue port with air only and then flush clear port with 30cc of saline qshift  -  resume PO Levothyroxine   - IV PPI ordered given PTA PPI   - Resumed PTA lisinopril, will continue to hold carvedilol at this time, but will have IV labetalol and hydralazine available PRN for elevated SBP >160.   - IS at bedside, encourage IS  - OOB, ambulate       This patient was seen and discussed with chief resident who discussed with staff    Mireya Roberto MD  General Surgery, PGY-1

## 2025-05-09 NOTE — PLAN OF CARE
19:00- 07:00  Goal Outcome Evaluation:    Plan of Care Reviewed With: patient    Overall Patient Progress: no change     Significant 24 hour events: Continued back pain controlled with PRN Tylenol suppository and Lidocaine patch. Potassium replaced.    Summary Type: 5A Post Op Report   POD: #5 = 5/9  LAPAROSCOPY, DIAGNOSTIC with lysis of adhesions, converted to open laparotomy with small bowel resection  x1  Pain:  Controlled Pain managed by PRN tylenol supp x2  and Lidocaine patch  Neuro:WDL  CV:WDL  Resp:WDL  GI: WDL except nausea and dry heaving , PRN compazine and zofran given  :WDL -voiding spontaneously w/ good UO  Skin: WDL except, integrity-w/ large hematoma on L lower abdomen, R abdominal incision CONNIE, closed with staples   Activity Assistance: assistance, 1 person- w/ walker put R leg brace when walking  Safety/Falls Risk: Level of Risk: High Risk  Consults: PT/OT  Precautions: Isolation Precautions-MRSA contact    Hypertensive, labetalol 10mg given once, OVSS on RA. With LR running at 100ml/hr via PICC. Potassium and Phos replaced. Continue w/ POC.

## 2025-05-10 LAB
CREAT SERPL-MCNC: 0.58 MG/DL (ref 0.51–0.95)
EGFRCR SERPLBLD CKD-EPI 2021: >90 ML/MIN/1.73M2
MAGNESIUM SERPL-MCNC: 1.8 MG/DL (ref 1.7–2.3)
PHOSPHATE SERPL-MCNC: 2.4 MG/DL (ref 2.5–4.5)
PLATELET # BLD AUTO: 238 10E3/UL (ref 150–450)
POTASSIUM SERPL-SCNC: 4 MMOL/L (ref 3.4–5.3)

## 2025-05-10 PROCEDURE — 258N000003 HC RX IP 258 OP 636: Performed by: SURGERY

## 2025-05-10 PROCEDURE — 83735 ASSAY OF MAGNESIUM: CPT

## 2025-05-10 PROCEDURE — 250N000011 HC RX IP 250 OP 636: Performed by: STUDENT IN AN ORGANIZED HEALTH CARE EDUCATION/TRAINING PROGRAM

## 2025-05-10 PROCEDURE — 120N000002 HC R&B MED SURG/OB UMMC

## 2025-05-10 PROCEDURE — 250N000011 HC RX IP 250 OP 636

## 2025-05-10 PROCEDURE — 250N000009 HC RX 250: Performed by: SURGERY

## 2025-05-10 PROCEDURE — 82565 ASSAY OF CREATININE: CPT | Performed by: STUDENT IN AN ORGANIZED HEALTH CARE EDUCATION/TRAINING PROGRAM

## 2025-05-10 PROCEDURE — 250N000013 HC RX MED GY IP 250 OP 250 PS 637: Performed by: SURGERY

## 2025-05-10 PROCEDURE — 84100 ASSAY OF PHOSPHORUS: CPT | Performed by: SURGERY

## 2025-05-10 PROCEDURE — 84132 ASSAY OF SERUM POTASSIUM: CPT | Performed by: SURGERY

## 2025-05-10 PROCEDURE — 85049 AUTOMATED PLATELET COUNT: CPT | Performed by: STUDENT IN AN ORGANIZED HEALTH CARE EDUCATION/TRAINING PROGRAM

## 2025-05-10 PROCEDURE — 250N000013 HC RX MED GY IP 250 OP 250 PS 637: Performed by: STUDENT IN AN ORGANIZED HEALTH CARE EDUCATION/TRAINING PROGRAM

## 2025-05-10 PROCEDURE — 250N000013 HC RX MED GY IP 250 OP 250 PS 637

## 2025-05-10 RX ORDER — MAGNESIUM OXIDE 400 MG/1
400 TABLET ORAL EVERY 4 HOURS
Status: COMPLETED | OUTPATIENT
Start: 2025-05-10 | End: 2025-05-10

## 2025-05-10 RX ADMIN — Medication 5 ML: at 02:34

## 2025-05-10 RX ADMIN — ACETAMINOPHEN 650 MG: 325 TABLET ORAL at 02:27

## 2025-05-10 RX ADMIN — PROCHLORPERAZINE MALEATE 5 MG: 5 TABLET ORAL at 04:33

## 2025-05-10 RX ADMIN — ACETAMINOPHEN 650 MG: 325 TABLET ORAL at 15:33

## 2025-05-10 RX ADMIN — ONDANSETRON 4 MG: 4 TABLET, ORALLY DISINTEGRATING ORAL at 02:27

## 2025-05-10 RX ADMIN — ACETAMINOPHEN 650 MG: 325 TABLET ORAL at 06:29

## 2025-05-10 RX ADMIN — ONDANSETRON 4 MG: 4 TABLET, ORALLY DISINTEGRATING ORAL at 21:15

## 2025-05-10 RX ADMIN — LIDOCAINE 4% 2 PATCH: 40 PATCH TOPICAL at 21:15

## 2025-05-10 RX ADMIN — ACETAMINOPHEN 650 MG: 325 TABLET ORAL at 19:47

## 2025-05-10 RX ADMIN — CARVEDILOL 12.5 MG: 3.12 TABLET, FILM COATED ORAL at 08:44

## 2025-05-10 RX ADMIN — POTASSIUM CHLORIDE, DEXTROSE MONOHYDRATE AND SODIUM CHLORIDE: 150; 5; 450 INJECTION, SOLUTION INTRAVENOUS at 15:34

## 2025-05-10 RX ADMIN — Medication 56 MCG: at 08:44

## 2025-05-10 RX ADMIN — OXYCODONE HYDROCHLORIDE 5 MG: 5 TABLET ORAL at 22:18

## 2025-05-10 RX ADMIN — MAGNESIUM OXIDE TAB 400 MG (241.3 MG ELEMENTAL MG) 400 MG: 400 (241.3 MG) TAB at 06:30

## 2025-05-10 RX ADMIN — ACETAMINOPHEN 650 MG: 325 TABLET ORAL at 11:10

## 2025-05-10 RX ADMIN — OXYCODONE HYDROCHLORIDE 5 MG: 5 TABLET ORAL at 15:33

## 2025-05-10 RX ADMIN — POTASSIUM PHOSPHATE, MONOBASIC POTASSIUM PHOSPHATE, DIBASIC 9 MMOL: 224; 236 INJECTION, SOLUTION, CONCENTRATE INTRAVENOUS at 06:30

## 2025-05-10 RX ADMIN — CARVEDILOL 12.5 MG: 3.12 TABLET, FILM COATED ORAL at 19:47

## 2025-05-10 RX ADMIN — Medication 5 ML: at 04:09

## 2025-05-10 RX ADMIN — LISINOPRIL 20 MG: 20 TABLET ORAL at 08:44

## 2025-05-10 RX ADMIN — POTASSIUM CHLORIDE, DEXTROSE MONOHYDRATE AND SODIUM CHLORIDE: 150; 5; 450 INJECTION, SOLUTION INTRAVENOUS at 04:40

## 2025-05-10 RX ADMIN — LISINOPRIL 20 MG: 20 TABLET ORAL at 19:47

## 2025-05-10 RX ADMIN — PANTOPRAZOLE SODIUM 40 MG: 40 INJECTION, POWDER, FOR SOLUTION INTRAVENOUS at 08:44

## 2025-05-10 RX ADMIN — OXYCODONE HYDROCHLORIDE 5 MG: 5 TABLET ORAL at 09:04

## 2025-05-10 RX ADMIN — Medication 5 ML: at 15:34

## 2025-05-10 RX ADMIN — MAGNESIUM OXIDE TAB 400 MG (241.3 MG ELEMENTAL MG) 400 MG: 400 (241.3 MG) TAB at 11:10

## 2025-05-10 RX ADMIN — ENOXAPARIN SODIUM 40 MG: 40 INJECTION SUBCUTANEOUS at 11:10

## 2025-05-10 ASSESSMENT — ACTIVITIES OF DAILY LIVING (ADL)
ADLS_ACUITY_SCORE: 57
ADLS_ACUITY_SCORE: 52
ADLS_ACUITY_SCORE: 52
ADLS_ACUITY_SCORE: 57
ADLS_ACUITY_SCORE: 57
ADLS_ACUITY_SCORE: 52
ADLS_ACUITY_SCORE: 57
ADLS_ACUITY_SCORE: 57
ADLS_ACUITY_SCORE: 52
ADLS_ACUITY_SCORE: 52
ADLS_ACUITY_SCORE: 57
ADLS_ACUITY_SCORE: 52
ADLS_ACUITY_SCORE: 57
ADLS_ACUITY_SCORE: 52
ADLS_ACUITY_SCORE: 57
ADLS_ACUITY_SCORE: 52
ADLS_ACUITY_SCORE: 52
ADLS_ACUITY_SCORE: 57
ADLS_ACUITY_SCORE: 52
ADLS_ACUITY_SCORE: 52
ADLS_ACUITY_SCORE: 57

## 2025-05-10 NOTE — PLAN OF CARE
[id number]    Goal Outcome Evaluation:      Plan of Care Reviewed With: patient    Overall Patient Progress: no changeOverall Patient Progress: no change     A&Ox4, but does seem to be intermittently forgetful @ times. Afebrile.  VSS on RA. A1. Denies SOB, and dizziness. Pain controlled lido patch and w/ PRN Tylenol x3 and Oxycodone x1. Intermittent Nausea somewhat controlled w/ Zofran x1 and compazine x1. Double lumen PICC- Purple infusing continuous D5 & NaCl + KCI @ 100mL/hr. Red- infusing pot. Phosphate @65mL over 4hrs and recheck in AM.  Uses call light appropriately. Voiding spontaneously w/ AUOP. LBM 5/4/25. Is NPO except sips & Chips as tolerated.  Able to make needs known. No acute events during shift. Contact precautions maintained.           Continue to monitor and follow POC       Level of Care: Acute    Summary Type: 5A Post Op Report   POD: #6 = 5/10  LAPAROSCOPY, DIAGNOSTIC with lysis of adhesions, converted to open laparotomy with small bowel resection  x1, N/A - Abdomen   Complications:    Pain:  Controlled Pain managed by PRN tylenol & Lidocaine patch  Neuro:WDL  CV:WDL  Resp:WDL  GI: WDL except nausea intermittently and no BM  :WDL -  Skin: .WDL except, integrityWDL except surgical site & large L abd side hematoma  Activity Assistance: assistance, 1 person   Safety/Falls Risk: Level of Risk: High Risk  Consults: PT/OT  Procedures/Imaging:   Precautions: Isolation Precautions-MRSA contact      Giselle Bray, RN......5/10/2025 7:00 AM

## 2025-05-10 NOTE — PROGRESS NOTES
Emergency General Surgery Progress Note  5/10/2025      Subjective:   No acute overnight events. Patient had NGT removed yesterday. Mild nausea intermittent, not nauseous this morning. No vomiting. Passing gas. No bowel movement. Patient reports the charted bowel movement was her suppository falling out. Pain controlled. Has been out of bed frequently. Voiding.     Objective:   Temp:  [98.6  F (37  C)-99.8  F (37.7  C)] 99  F (37.2  C)  Pulse:  [] 106  Resp:  [16-19] 19  BP: (126-155)/(87-96) 155/92  SpO2:  [94 %-98 %] 98 %  I/O last 3 completed shifts:  In: 1800 [P.O.:150; I.V.:1400; IV Piggyback:250]  Out: 600 [Emesis/NG output:600]      Gen: A&O x3, in NAD. NGT in place  CV: RRR. Pulses 2+  Pulm: Nonlabored breathing on RA  Abd: Soft, appropriately tender, not distended. Midline incision closed with staples, well approximated, no drainage expressed or surrounding erythema. Laparoscopic incisions clean, dry, intact with improving ecchymosis. Remains soft, not tender to palpation.   Extremities: warm and well perfused, moves all extremities spontaneously. No peripheral edema  Neuro: CN II-XII grossly intact, no focal deficits observed       Labs:  [facility]   Recent Labs   Lab 05/10/25  0404 05/09/25  1432 05/09/25  0450 05/08/25  2142 05/08/25  0627 05/07/25  0609 05/06/25  1935 05/06/25  0611 05/06/25  0609 05/05/25  0623 05/05/25  0533   NA  --   --  141  --   --  139  --  139  --   --  141   POTASSIUM 4.0  --  3.7  3.6 3.2* 2.9* 3.5   < > 3.4  --   --  3.5   CHLORIDE  --   --  104  --   --  103  --  103  --   --  104   RAMBO  --   --  8.3*  --   --  8.2*  --  8.5*  --   --  8.5*   CO2  --   --  21*  --   --  21*  --  22  --   --  23   BUN  --   --  12.7  --   --  12.9  --  20.6  --   --  20.0   CR 0.58  --  0.59  --   --  0.66  --  0.81  --   --  0.84   GLC  --   --  88  --   --  81  --  95 92   < > 109*   MAG 1.8  --  2.1  --  2.0 1.8  --  2.1  --   --  2.1   PHOS 2.4* 2.1* 1.8*  --  2.0* 2.1*   < > 1.9*  --    --  3.0    < > = values in this interval not displayed.     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 Imaging demonstrated incarcerated ventral hernia, prompting EGS involvement. On 5/4, patient underwent diagnostic laparoscopy converted to laparotomy with lysis of adhesions, small bowel resection, and primary closure of ventral hernia (no mesh).     Post-operatively, although patient with some return of bowel function (passing flatus), continued to have high NGT output and nausea. Nausea improving, NGT output no longer bilious as of 5/9 AM. NGT removed 5/9 and sips of clears. Tolerated with minimal nausea.       Today's Changes (5/10/2025):   - CLD.   - LR @ 100 mL/hr until adequate PO intake   - Pain Control: PO Tylenol or Rectal Tylenol q4h PRN, IV Robaxin 500mg q8h PRN, PO oxycodone 5mg q6h PRN. Lidocaine patches. Discontinued IV dilaudid as patient has not received in 36 hours.   - Flush NGT qshift: flush blue port with air only and then flush clear port with 30cc of saline qshift  - resume PO Levothyroxine   - IV PPI ordered given PTA PPI   - Resumed PTA lisinopril and carvedilol at this time.  IV labetalol and hydralazine available PRN for elevated SBP >160.   - IS at bedside, encourage IS  - OOB, ambulate       This patient was seen and discussed  with chief resident who discussed with staff    Mireya Roberto MD  General Surgery, PGY-1

## 2025-05-11 LAB
MAGNESIUM SERPL-MCNC: 1.8 MG/DL (ref 1.7–2.3)
PHOSPHATE SERPL-MCNC: 1.5 MG/DL (ref 2.5–4.5)
PHOSPHATE SERPL-MCNC: 2 MG/DL (ref 2.5–4.5)
POTASSIUM SERPL-SCNC: 3.8 MMOL/L (ref 3.4–5.3)

## 2025-05-11 PROCEDURE — 120N000002 HC R&B MED SURG/OB UMMC

## 2025-05-11 PROCEDURE — 84132 ASSAY OF SERUM POTASSIUM: CPT | Performed by: SURGERY

## 2025-05-11 PROCEDURE — 250N000013 HC RX MED GY IP 250 OP 250 PS 637: Performed by: SURGERY

## 2025-05-11 PROCEDURE — 250N000011 HC RX IP 250 OP 636

## 2025-05-11 PROCEDURE — 250N000013 HC RX MED GY IP 250 OP 250 PS 637: Performed by: STUDENT IN AN ORGANIZED HEALTH CARE EDUCATION/TRAINING PROGRAM

## 2025-05-11 PROCEDURE — 258N000003 HC RX IP 258 OP 636: Performed by: SURGERY

## 2025-05-11 PROCEDURE — 250N000013 HC RX MED GY IP 250 OP 250 PS 637

## 2025-05-11 PROCEDURE — 250N000009 HC RX 250: Performed by: SURGERY

## 2025-05-11 PROCEDURE — 83735 ASSAY OF MAGNESIUM: CPT | Performed by: SURGERY

## 2025-05-11 PROCEDURE — 84100 ASSAY OF PHOSPHORUS: CPT | Performed by: SURGERY

## 2025-05-11 PROCEDURE — 250N000011 HC RX IP 250 OP 636: Performed by: STUDENT IN AN ORGANIZED HEALTH CARE EDUCATION/TRAINING PROGRAM

## 2025-05-11 RX ORDER — ONDANSETRON 4 MG/1
4 TABLET, FILM COATED ORAL EVERY 6 HOURS PRN
Status: DISCONTINUED | OUTPATIENT
Start: 2025-05-11 | End: 2025-05-13 | Stop reason: HOSPADM

## 2025-05-11 RX ORDER — POTASSIUM CHLORIDE 750 MG/1
20 TABLET, EXTENDED RELEASE ORAL ONCE
Status: COMPLETED | OUTPATIENT
Start: 2025-05-11 | End: 2025-05-11

## 2025-05-11 RX ORDER — MAGNESIUM OXIDE 400 MG/1
400 TABLET ORAL EVERY 4 HOURS
Status: COMPLETED | OUTPATIENT
Start: 2025-05-11 | End: 2025-05-11

## 2025-05-11 RX ORDER — ONDANSETRON 2 MG/ML
4 INJECTION INTRAMUSCULAR; INTRAVENOUS EVERY 6 HOURS PRN
Status: DISCONTINUED | OUTPATIENT
Start: 2025-05-11 | End: 2025-05-13 | Stop reason: HOSPADM

## 2025-05-11 RX ADMIN — ONDANSETRON 4 MG: 4 TABLET, ORALLY DISINTEGRATING ORAL at 15:04

## 2025-05-11 RX ADMIN — POTASSIUM CHLORIDE 20 MEQ: 750 TABLET, EXTENDED RELEASE ORAL at 08:49

## 2025-05-11 RX ADMIN — LISINOPRIL 20 MG: 20 TABLET ORAL at 20:27

## 2025-05-11 RX ADMIN — ONDANSETRON 4 MG: 4 TABLET, ORALLY DISINTEGRATING ORAL at 09:45

## 2025-05-11 RX ADMIN — OXYCODONE HYDROCHLORIDE 5 MG: 5 TABLET ORAL at 15:04

## 2025-05-11 RX ADMIN — ACETAMINOPHEN 650 MG: 325 TABLET ORAL at 14:11

## 2025-05-11 RX ADMIN — ACETAMINOPHEN 650 MG: 325 TABLET ORAL at 05:44

## 2025-05-11 RX ADMIN — POTASSIUM & SODIUM PHOSPHATES POWDER PACK 280-160-250 MG 2 PACKET: 280-160-250 PACK at 16:58

## 2025-05-11 RX ADMIN — POTASSIUM & SODIUM PHOSPHATES POWDER PACK 280-160-250 MG 2 PACKET: 280-160-250 PACK at 08:49

## 2025-05-11 RX ADMIN — MAGNESIUM OXIDE TAB 400 MG (241.3 MG ELEMENTAL MG) 400 MG: 400 (241.3 MG) TAB at 08:49

## 2025-05-11 RX ADMIN — LIDOCAINE 4% 2 PATCH: 40 PATCH TOPICAL at 22:42

## 2025-05-11 RX ADMIN — POTASSIUM PHOSPHATE, MONOBASIC POTASSIUM PHOSPHATE, DIBASIC 9 MMOL: 224; 236 INJECTION, SOLUTION, CONCENTRATE INTRAVENOUS at 22:44

## 2025-05-11 RX ADMIN — OXYCODONE HYDROCHLORIDE 5 MG: 5 TABLET ORAL at 21:08

## 2025-05-11 RX ADMIN — CARVEDILOL 12.5 MG: 3.12 TABLET, FILM COATED ORAL at 19:16

## 2025-05-11 RX ADMIN — Medication 56 MCG: at 08:27

## 2025-05-11 RX ADMIN — ENOXAPARIN SODIUM 40 MG: 40 INJECTION SUBCUTANEOUS at 09:46

## 2025-05-11 RX ADMIN — MAGNESIUM OXIDE TAB 400 MG (241.3 MG ELEMENTAL MG) 400 MG: 400 (241.3 MG) TAB at 12:28

## 2025-05-11 RX ADMIN — ONDANSETRON HYDROCHLORIDE 4 MG: 4 TABLET, FILM COATED ORAL at 21:08

## 2025-05-11 RX ADMIN — Medication 5 ML: at 05:44

## 2025-05-11 RX ADMIN — POTASSIUM CHLORIDE, DEXTROSE MONOHYDRATE AND SODIUM CHLORIDE: 150; 5; 450 INJECTION, SOLUTION INTRAVENOUS at 03:01

## 2025-05-11 RX ADMIN — OXYCODONE HYDROCHLORIDE 5 MG: 5 TABLET ORAL at 08:49

## 2025-05-11 RX ADMIN — LISINOPRIL 20 MG: 20 TABLET ORAL at 08:49

## 2025-05-11 RX ADMIN — PANTOPRAZOLE SODIUM 40 MG: 40 INJECTION, POWDER, FOR SOLUTION INTRAVENOUS at 08:33

## 2025-05-11 RX ADMIN — ACETAMINOPHEN 650 MG: 325 TABLET ORAL at 18:09

## 2025-05-11 RX ADMIN — Medication 5 ML: at 20:35

## 2025-05-11 RX ADMIN — ACETAMINOPHEN 650 MG: 325 TABLET ORAL at 22:42

## 2025-05-11 RX ADMIN — POTASSIUM & SODIUM PHOSPHATES POWDER PACK 280-160-250 MG 2 PACKET: 280-160-250 PACK at 12:28

## 2025-05-11 RX ADMIN — ACETAMINOPHEN 650 MG: 325 TABLET ORAL at 01:41

## 2025-05-11 RX ADMIN — CARVEDILOL 12.5 MG: 3.12 TABLET, FILM COATED ORAL at 08:49

## 2025-05-11 ASSESSMENT — ACTIVITIES OF DAILY LIVING (ADL)
ADLS_ACUITY_SCORE: 57

## 2025-05-11 NOTE — PLAN OF CARE
Goal Outcome Evaluation:      Plan of Care Reviewed With: patient    Overall Patient Progress: improvingOverall Patient Progress: improving    Outcome Evaluation: Tolerated clear liquids and some crackers    Significant 24 hour events: Advanced to a clear liquid diet, then regular diet. Ate crackers and drank clear liquids.     Level of Care: Acute    Summary Type: 5A Post Op Report   POD: #6 = 5/10  LAPAROSCOPY, DIAGNOSTIC with lysis of adhesions, converted to open laparotomy with small bowel resection  x1, N/A - Abdomen   Complications:    Pain:  Controlled Pain managed by PRN tylenol & Lidocaine patch  Neuro:WDL  CV:WDL  Resp:WDL  GI: WDL except nausea intermittently and no BM  :.WDL except incontinent of urine x2  Skin: .WDL except, integrityWDL except surgical site & large L abd side hematoma  Activity Assistance: assistance, 1 person, walker and gait belt. Ambulated in hallway x2, sat in chair x3 for long periods of time  Safety/Falls Risk: Level of Risk: High Risk  Consults: PT/OT  Procedures/Imaging:   Precautions: Isolation Precautions-MRSA contact

## 2025-05-11 NOTE — PLAN OF CARE
Goal Outcome Evaluation:                 Outcome Evaluation: Patient will discharge to brother's home with home care PT/OT/SN services.

## 2025-05-11 NOTE — PROGRESS NOTES
Care Management Follow Up    Length of Stay (days): 7    Expected Discharge Date: 05/13/2025     Concerns to be Addressed: discharge planning     Patient plan of care discussed at interdisciplinary rounds: Yes    Anticipated Discharge Disposition: Home Care              Anticipated Discharge Services: Home Care  Anticipated Discharge DME: None    Patient/family educated on Medicare website which has current facility and service quality ratings: yes  Education Provided on the Discharge Plan:    Patient/Family in Agreement with the Plan:      Referrals Placed by CM/SW: Internal Clinic Care Coordination, Homecare  Private pay costs discussed: Not applicable    Discussed  Partnership in Safe Discharge Planning  document with patient/family: No     Handoff Completed: Yes, [facility] PCP: Internal handoff referral completed    Additional Information:  Spoke with Surgery team, confirmed ZA of 05/13/25. Patient has progressed to regular diet, TPN is not part of discharge plan.      Update sent to [facility].    Home Medical Care    Coordination complete.  Service Provider Request Status Services Address Phone Fax Patient Preferred   [facility]  Selected Home Health Services [address] [place] [address], [facility] [address], [facility] [id number] [phone] --        Discharge Address:   [address]anden [address]  [address] [place]  [address]     Next Steps:   [] Send Home Care Orders at discharge  [] Give IMM  Family to transport  IHO done      CODY ESPINOSA, RN Care Coordinator   5/11/2025  Nurse Coordinator      Social Work and Care Management Department       SEARCHABLE in [facility] - search CARE COORDINATOR       [place] [employer] [place] ([phone]) Saturday & Sunday; ([phone]) [facility] Recognized Holidays     Units: 5A Onc 5201 - 5219 RNCC,  5A Onc 5220 thru 5240 RNCC, 5C OFFSERVICE [phone] RNCC & 5C OFF SERVICE [phone] RNCC       Units: 6B Vocera, 6C Card 6401 thru 6420 RNCC, 6C Card 6502 thru 6514 RNCC & 6C  Card 6515 thru 6519 RNCC        Units: 7A SOT RNCC Vocera, 7B Med Surg Vocera, 7C Med Surg 7401 thru 7418 RNCC & 7C Med Surg 7502 thru 7521 RNCC       Units: 6A Vocera & 4A CVICU Vocera, 4C MICU Vocera, and 4E SICU Vocera         Units: 5 Ortho Vocera & 5 Med Surg Vocera        Units: 6 Med Surg Vocera & 8 Med Surg Vocera            CODY ESPINOSA, RN

## 2025-05-11 NOTE — PLAN OF CARE
Goal Outcome Evaluation:      Plan of Care Reviewed With: patient    Overall Patient Progress: improvingOverall Patient Progress: improving       A&Ox4 but forgetful at times. Afebrile.  VSS on RA, does have hypertensive parameters w/ PRN meds avail. A1. Denies SOB and dizziness. Pain controlled w/ lido patch and PRN Tylenol x2 and Oxy x1. Intermittent Nausea controlled w/ PRN zofran x1. Double lumen PICC- purple infusing continuous D5 & NaCl + KCI @100mL/hr. Red -hep locked.   Uses call light appropriately. Voiding spontaneously w/ AUOP and has been intermittently incontinent. LBM 5/4/25. Regular diet, but fearful of eating so only eat crackers so far. Able to make needs known. No acute events during shift. Contact precautions maintained.        Continue to monitor and follow POC         Level of Care: Acute    Summary Type: 5A Post Op Report   POD: #7 = 5/11  LAPAROSCOPY, DIAGNOSTIC with lysis of adhesions, converted to open laparotomy with small bowel resection  x1, N/A - Abdomen   Complications:    Pain:  Controlled Pain managed by PRN tylenol & Lidocaine patch  Neuro:WDL  CV:WDL  Resp:WDL  GI: WDL except nausea intermittently and no BM  :.WDL except -  Skin: .WDL except, integrityWDL except surgical site & large L abd side hematoma  Activity Assistance: assistance, 1 person   Safety/Falls Risk: Level of Risk: High Risk  Consults: PT/OT  Procedures/Imaging:   Precautions: Isolation Precautions-MRSA contact    Giselle Bray, RN......5/10/2025 10:56 PM

## 2025-05-11 NOTE — PLAN OF CARE
Goal Outcome Evaluation:      Plan of Care Reviewed With: patient    Overall Patient Progress: improvingOverall Patient Progress: improving    Outcome Evaluation: Tolerated regular diet. Awaiting return of bowel function.     Significant 24 hour events:     Level of Care: Acute    Summary Type: 5A Post Op Report   POD: #7 = 5/11  LAPAROSCOPY, DIAGNOSTIC with lysis of adhesions, converted to open laparotomy with small bowel resection  x1, N/A - Abdomen   Complications:    Pain:  Controlled Pain managed by prn Tylenol and Oxycodone.   Neuro:WDL  CV:WDL  Resp:WDL  GI: WDL except nausea intermittently and no BM  :.WDL except incontinent of urine.   Skin: .WDL except, integrityWDL except surgical site & large L abd side hematoma  Activity Assistance: assistance, 1 person to the chair, assist of 2 on walks.   Safety/Falls Risk: Level of Risk: High Risk  Consults: PT/OT  Procedures/Imaging:   Precautions: Isolation Precautions-MRSA contact    Shift updates: potassium, magnesium and phosphorus replacements given. Phos recheck at 9pm. Continue to encourage ambulation. Reported passing more gas, but no stool yet.

## 2025-05-12 ENCOUNTER — APPOINTMENT (OUTPATIENT)
Dept: PHYSICAL THERAPY | Facility: CLINIC | Age: 75
DRG: 331 | End: 2025-05-12
Payer: COMMERCIAL

## 2025-05-12 LAB
MAGNESIUM SERPL-MCNC: 1.8 MG/DL (ref 1.7–2.3)
PHOSPHATE SERPL-MCNC: 2.8 MG/DL (ref 2.5–4.5)
POTASSIUM SERPL-SCNC: 3.8 MMOL/L (ref 3.4–5.3)

## 2025-05-12 PROCEDURE — 999N000202 HC STATISTICAL VASC ACCESS NURSE TIME, 1-15 MINUTES

## 2025-05-12 PROCEDURE — 83735 ASSAY OF MAGNESIUM: CPT | Performed by: SURGERY

## 2025-05-12 PROCEDURE — 84132 ASSAY OF SERUM POTASSIUM: CPT | Performed by: SURGERY

## 2025-05-12 PROCEDURE — 258N000003 HC RX IP 258 OP 636

## 2025-05-12 PROCEDURE — 250N000013 HC RX MED GY IP 250 OP 250 PS 637: Performed by: STUDENT IN AN ORGANIZED HEALTH CARE EDUCATION/TRAINING PROGRAM

## 2025-05-12 PROCEDURE — 999N000007 HC SITE CHECK

## 2025-05-12 PROCEDURE — 250N000013 HC RX MED GY IP 250 OP 250 PS 637

## 2025-05-12 PROCEDURE — 250N000011 HC RX IP 250 OP 636

## 2025-05-12 PROCEDURE — 250N000013 HC RX MED GY IP 250 OP 250 PS 637: Performed by: SURGERY

## 2025-05-12 PROCEDURE — 120N000002 HC R&B MED SURG/OB UMMC

## 2025-05-12 PROCEDURE — 97530 THERAPEUTIC ACTIVITIES: CPT | Mod: GP | Performed by: REHABILITATION PRACTITIONER

## 2025-05-12 PROCEDURE — 250N000011 HC RX IP 250 OP 636: Performed by: STUDENT IN AN ORGANIZED HEALTH CARE EDUCATION/TRAINING PROGRAM

## 2025-05-12 PROCEDURE — 84100 ASSAY OF PHOSPHORUS: CPT | Performed by: SURGERY

## 2025-05-12 PROCEDURE — 97116 GAIT TRAINING THERAPY: CPT | Mod: GP | Performed by: REHABILITATION PRACTITIONER

## 2025-05-12 RX ORDER — GABAPENTIN 300 MG/1
300 CAPSULE ORAL 3 TIMES DAILY
Status: DISCONTINUED | OUTPATIENT
Start: 2025-05-12 | End: 2025-05-13 | Stop reason: HOSPADM

## 2025-05-12 RX ORDER — ATORVASTATIN CALCIUM 40 MG/1
80 TABLET, FILM COATED ORAL EVERY EVENING
Status: DISCONTINUED | OUTPATIENT
Start: 2025-05-12 | End: 2025-05-13 | Stop reason: HOSPADM

## 2025-05-12 RX ORDER — METHOCARBAMOL 500 MG/1
500 TABLET, FILM COATED ORAL 4 TIMES DAILY PRN
Status: DISCONTINUED | OUTPATIENT
Start: 2025-05-12 | End: 2025-05-13 | Stop reason: HOSPADM

## 2025-05-12 RX ORDER — MAGNESIUM OXIDE 400 MG/1
400 TABLET ORAL EVERY 4 HOURS
Status: COMPLETED | OUTPATIENT
Start: 2025-05-12 | End: 2025-05-12

## 2025-05-12 RX ORDER — POTASSIUM CHLORIDE 750 MG/1
20 TABLET, EXTENDED RELEASE ORAL ONCE
Status: COMPLETED | OUTPATIENT
Start: 2025-05-12 | End: 2025-05-12

## 2025-05-12 RX ORDER — HYDROCHLOROTHIAZIDE 25 MG/1
25 TABLET ORAL DAILY
Status: DISCONTINUED | OUTPATIENT
Start: 2025-05-12 | End: 2025-05-13 | Stop reason: HOSPADM

## 2025-05-12 RX ORDER — PANTOPRAZOLE SODIUM 40 MG/1
40 TABLET, DELAYED RELEASE ORAL
Status: DISCONTINUED | OUTPATIENT
Start: 2025-05-13 | End: 2025-05-13 | Stop reason: HOSPADM

## 2025-05-12 RX ADMIN — POTASSIUM CHLORIDE, DEXTROSE MONOHYDRATE AND SODIUM CHLORIDE: 150; 5; 450 INJECTION, SOLUTION INTRAVENOUS at 21:24

## 2025-05-12 RX ADMIN — GABAPENTIN 300 MG: 300 CAPSULE ORAL at 21:17

## 2025-05-12 RX ADMIN — PROCHLORPERAZINE MALEATE 5 MG: 5 TABLET ORAL at 09:04

## 2025-05-12 RX ADMIN — MAGNESIUM OXIDE TAB 400 MG (241.3 MG ELEMENTAL MG) 400 MG: 400 (241.3 MG) TAB at 13:26

## 2025-05-12 RX ADMIN — ACETAMINOPHEN 650 MG: 325 TABLET ORAL at 14:45

## 2025-05-12 RX ADMIN — Medication 5 MG: at 01:01

## 2025-05-12 RX ADMIN — ACETAMINOPHEN 650 MG: 325 TABLET ORAL at 05:35

## 2025-05-12 RX ADMIN — LISINOPRIL 20 MG: 20 TABLET ORAL at 21:17

## 2025-05-12 RX ADMIN — PANTOPRAZOLE SODIUM 40 MG: 40 INJECTION, POWDER, FOR SOLUTION INTRAVENOUS at 08:53

## 2025-05-12 RX ADMIN — MAGNESIUM OXIDE TAB 400 MG (241.3 MG ELEMENTAL MG) 400 MG: 400 (241.3 MG) TAB at 09:19

## 2025-05-12 RX ADMIN — HYDROCHLOROTHIAZIDE 25 MG: 25 TABLET ORAL at 11:55

## 2025-05-12 RX ADMIN — ACETAMINOPHEN 650 MG: 325 TABLET ORAL at 18:49

## 2025-05-12 RX ADMIN — CARVEDILOL 12.5 MG: 3.12 TABLET, FILM COATED ORAL at 17:30

## 2025-05-12 RX ADMIN — POTASSIUM CHLORIDE, DEXTROSE MONOHYDRATE AND SODIUM CHLORIDE: 150; 5; 450 INJECTION, SOLUTION INTRAVENOUS at 01:01

## 2025-05-12 RX ADMIN — OXYCODONE HYDROCHLORIDE 5 MG: 5 TABLET ORAL at 15:19

## 2025-05-12 RX ADMIN — LISINOPRIL 20 MG: 20 TABLET ORAL at 08:52

## 2025-05-12 RX ADMIN — METHOCARBAMOL 500 MG: 500 TABLET ORAL at 17:30

## 2025-05-12 RX ADMIN — ATORVASTATIN CALCIUM 80 MG: 40 TABLET, FILM COATED ORAL at 21:17

## 2025-05-12 RX ADMIN — Medication 56 MCG: at 08:53

## 2025-05-12 RX ADMIN — CARVEDILOL 12.5 MG: 3.12 TABLET, FILM COATED ORAL at 08:52

## 2025-05-12 RX ADMIN — ONDANSETRON HYDROCHLORIDE 4 MG: 4 TABLET, FILM COATED ORAL at 13:24

## 2025-05-12 RX ADMIN — LIDOCAINE 4% 2 PATCH: 40 PATCH TOPICAL at 21:23

## 2025-05-12 RX ADMIN — ONDANSETRON HYDROCHLORIDE 4 MG: 4 TABLET, FILM COATED ORAL at 05:35

## 2025-05-12 RX ADMIN — Medication 5 ML: at 13:28

## 2025-05-12 RX ADMIN — POTASSIUM CHLORIDE 20 MEQ: 750 TABLET, EXTENDED RELEASE ORAL at 09:19

## 2025-05-12 RX ADMIN — OXYCODONE HYDROCHLORIDE 5 MG: 5 TABLET ORAL at 09:18

## 2025-05-12 RX ADMIN — GABAPENTIN 300 MG: 300 CAPSULE ORAL at 13:26

## 2025-05-12 RX ADMIN — OXYCODONE HYDROCHLORIDE 5 MG: 5 TABLET ORAL at 21:17

## 2025-05-12 RX ADMIN — METHOCARBAMOL 500 MG: 500 TABLET ORAL at 11:55

## 2025-05-12 RX ADMIN — Medication 5 ML: at 05:35

## 2025-05-12 RX ADMIN — ENOXAPARIN SODIUM 40 MG: 40 INJECTION SUBCUTANEOUS at 09:22

## 2025-05-12 ASSESSMENT — ACTIVITIES OF DAILY LIVING (ADL)
ADLS_ACUITY_SCORE: 57
ADLS_ACUITY_SCORE: 58
ADLS_ACUITY_SCORE: 57
ADLS_ACUITY_SCORE: 57
ADLS_ACUITY_SCORE: 58
ADLS_ACUITY_SCORE: 57
ADLS_ACUITY_SCORE: 58
ADLS_ACUITY_SCORE: 58
ADLS_ACUITY_SCORE: 57
ADLS_ACUITY_SCORE: 58

## 2025-05-12 NOTE — PLAN OF CARE
"Goal Outcome Evaluation:      Plan of Care Reviewed With: patient    Overall Patient Progress: improvingOverall Patient Progress: improving    Outcome Evaluation: poor appetite/nausea taking Zofran/compazin. Pain controlled by PRN oxycodone/Robaxin and Tylenol      BP (!) 139/92 (BP Location: Right arm)   Pulse 87   Temp 99  F (37.2  C) (Oral)   Resp 18   Ht 1.651 m (5' 5\")   Wt 71.2 kg (156 lb 15.5 oz)   LMP  (LMP Unknown)   SpO2 98%   BMI 26.12 kg/m       Neuro: A/Ox3, [place] at baseline  VS: VSS on RA  Pain: c/o back/leg pain controlled by PRN pain meds (see MAR)  Electrolytes: K 3.8 replaced with 20mEq PO, Mg 1.8 replaced with Mg 400mg Q4h, Phos 2.8 and all recheck tomorrow AM  PICC: infusing D5 1/2NS+KCl 50ml/h  GI: on full liquid diet but not eating. C/o nausea treated by Zofran and Compazine PO. No BM but passing flatus  : incontinent x2  Skin: Abdominal incision intact CONNIE  Activity - Assist of 1 with GB/walker, up in chair in AM.      "

## 2025-05-12 NOTE — PROGRESS NOTES
Emergency General Surgery Progress Note  5/12/2025      Subjective:   No acute event overnight. Resting comfortably in bed this AM. States that she ate toasts, pudding and had an ensure that she tolerated well. She is passing more gas. No episodes of emesis. She had on and off nausea yesterday.    Objective:   Temp:  [98.7  F (37.1  C)-99.2  F (37.3  C)] 99.2  F (37.3  C)  Pulse:  [70-87] 78  Resp:  [16-18] 18  BP: (136-154)/() 136/110  SpO2:  [94 %-99 %] 97 %  I/O last 3 completed shifts:  In: 2529.17 [P.O.:1520; I.V.:759.17; IV Piggyback:250]  Out: -     Gen: resting comfortably in bed, NAD  CV: normal heart rate, normotensive   Pulm: Nonlabored breathing on RA  Abd: Soft, appropriately tender, not distended. Midline incision closed with staples, well approximated, no drainage expressed or surrounding erythema. Laparoscopic incisions clean, dry, intact with improving ecchymosis. Remains soft, not tender to palpation.   Extremities: warm and well perfused, moves all extremities spontaneously. No peripheral edema  Neuro: awake and alert    Labs:  [facility]   Recent Labs   Lab 05/12/25  0539 05/11/25  2030 05/11/25  0537 05/10/25  0404 05/09/25  1432 05/09/25  0450 05/08/25  0627 05/07/25  0609 05/06/25  1935 05/06/25  0611 05/06/25  0609   0000   NA  --   --   --   --   --  141  --  139  --  139  --   --    POTASSIUM 3.8  --  3.8 4.0  --  3.7  3.6   < > 3.5   < > 3.4  --   --    CHLORIDE  --   --   --   --   --  104  --  103  --  103  --   --    RAMBO  --   --   --   --   --  8.3*  --  8.2*  --  8.5*  --   --    CO2  --   --   --   --   --  21*  --  21*  --  22  --   --    BUN  --   --   --   --   --  12.7  --  12.9  --  20.6  --   --    CR  --   --   --  0.58  --  0.59  --  0.66  --  0.81  --   --    GLC  --   --   --   --   --  88  --  81  --  95 92  --    MAG 1.8  --  1.8 1.8  --  2.1   < > 1.8  --  2.1  --    < >   PHOS 2.8 2.0* 1.5* 2.4*   < > 1.8*   < > 2.1*   < > 1.9*  --   --     < > = values in this interval  not displayed.     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 Imaging demonstrated incarcerated ventral hernia, prompting EGS involvement. On 5/4, patient underwent diagnostic laparoscopy converted to laparotomy with lysis of adhesions, small bowel resection, and primary closure of ventral hernia (no mesh).     Post-operatively, although patient with some return of bowel function (passing flatus), continued to have high NGT output and nausea. Nausea improving, NGT output no longer bilious as of 5/9 AM. NGT removed 5/9 and sips of clears. Tolerated with minimal nausea. Advanced to regular diet on 5/10. She is passing gas and tolerating regular diet.     Today's Changes:   - Regular diet, recommend at least 1-2 Ensures each day  - Decrease maintenance fluids to @ 50 mL/hr. Recommend increasing PO intake  - P improved to 2.8 continue nurse driven protocol for electrolyte replacements.  - Pain Control: PO Tylenol or Rectal Tylenol q4h PRN, IV Robaxin 500mg q8h PRN, PO oxycodone 5mg q6h PRN. Lidocaine patches.    - PO Levothyroxine   - IV PPI ordered given PTA PPI   - PTA lisinopril and carvedilol at this time.  IV labetalol and hydralazine available PRN for elevated SBP >160.   - IS at bedside, encourage IS  - Encourage OOB, ambulate     This patient was seen and discussed with chief resident who discussed with staff    Edward Brewer MD  Urology PGY-1  General surgery service

## 2025-05-12 NOTE — PLAN OF CARE
"Goal Outcome Evaluation:    Plan of Care Reviewed With: patient    Overall Patient Progress: improvingOverall Patient Progress: improving    Outcome Evaluation: regular diet, poor appetite, pain Control w/ current pain regimen,encourage IS, encourage OOB, ambulate, encourage PO hydration    BP (!) 129/102   Pulse 79   Temp 98.9  F (37.2  C) (Oral)   Resp 18   Ht 1.651 m (5' 5\")   Wt 71.2 kg (156 lb 15.5 oz)   LMP  (LMP Unknown)   SpO2 98%   BMI 26.12 kg/m        1500 - 2300     Significant 24 hour events: pain well controlled w/ current pain regimen     Level of Care: Acute    Summary Type: 5A Post Op Report   POD #8 = 5/12 LAPAROSCOPY, DIAGNOSTIC with lysis of adhesions, converted to open laparotomy with small bowel resection  x1, N/A - Abdomen   Complications:    Pain: Controlled w/ current pain regimen, prn robaxin,Tylenol and oxycodone  Neuro: WDL, A&O, calls appropriately   CV: WDL except, HTN intermittently  Resp: WDL on RA  GI: WDL except, nausea intermittently and no BM yet  :.WDL except, incontinent of bladder  Skin: WDL except, integrity surgical site & large L abd side hematoma  Diet: regular diet   Activity Assistance: assistance, 1 person   Safety/Falls Risk: Level of Risk: High Risk  Consults: PT/OT  Procedures/Imaging:   Precautions: Isolation Precautions-MRSA contact  Lines: PICC        "

## 2025-05-12 NOTE — PLAN OF CARE
[id number]    Goal Outcome Evaluation:      Plan of Care Reviewed With: patient    Overall Patient Progress: no changeOverall Patient Progress: no change     A&Ox4. Afebrile.  VSS on RA. Has HTN parameters w/ PRN meds avail. SBA-A1 in room. Denies SOB and dizziness. Pain controlled w/ lido patch and PRN Tylenol x1 and Oxycodone x1. Intermittent Nausea controlled w/ PRN Zofran x1. PRN Melatonin x1 also given d/t pt requesting sleep meds.Double lumen PICC- Purple infusing Phos replacement and recheck in AM. Red lumen infusing D5 & 0.45% NaCl + KCI @ 50mL/hr.  Uses call light appropriately. Voiding spontaneously w/ AUOP, but has been incontinent. LBM 5/4/25.  Able to make needs known. No acute events during shift. Contact precautions maintained.      Significant 24 hour events:     Level of Care: Acute    Summary Type: 5A Post Op Report   POD: #8 = 5/12 LAPAROSCOPY, DIAGNOSTIC with lysis of adhesions, converted to open laparotomy with small bowel resection  x1, N/A - Abdomen   Complications:    Pain:  Controlled Pain managed by prn Tylenol and Oxycodone.   Neuro:WDL  CV:WDL  Resp:WDL  GI: WDL except nausea intermittently and no BM  :.WDL except -incontinence  Skin: .WDL except, integrityWDL except surgical site & large L abd side hematoma  Activity Assistance: assistance, 1 person   Safety/Falls Risk: Level of Risk: High Risk  Consults: PT/OT  Procedures/Imaging:   Precautions: Isolation Precautions-MRSA contact      Continue to monitor and follow POC           Giselle Bray, RN......5/11/2025 9:50 PM

## 2025-05-13 ENCOUNTER — APPOINTMENT (OUTPATIENT)
Dept: PHYSICAL THERAPY | Facility: CLINIC | Age: 75
DRG: 331 | End: 2025-05-13
Payer: COMMERCIAL

## 2025-05-13 VITALS
OXYGEN SATURATION: 97 % | SYSTOLIC BLOOD PRESSURE: 136 MMHG | RESPIRATION RATE: 18 BRPM | DIASTOLIC BLOOD PRESSURE: 88 MMHG | BODY MASS INDEX: 26.15 KG/M2 | WEIGHT: 156.97 LBS | HEART RATE: 79 BPM | HEIGHT: 65 IN | TEMPERATURE: 99.2 F

## 2025-05-13 LAB
CREAT SERPL-MCNC: 0.66 MG/DL (ref 0.51–0.95)
EGFRCR SERPLBLD CKD-EPI 2021: >90 ML/MIN/1.73M2
MAGNESIUM SERPL-MCNC: 1.9 MG/DL (ref 1.7–2.3)
PHOSPHATE SERPL-MCNC: 2.4 MG/DL (ref 2.5–4.5)
PLATELET # BLD AUTO: 260 10E3/UL (ref 150–450)
POTASSIUM SERPL-SCNC: 3.7 MMOL/L (ref 3.4–5.3)

## 2025-05-13 PROCEDURE — 250N000013 HC RX MED GY IP 250 OP 250 PS 637: Performed by: STUDENT IN AN ORGANIZED HEALTH CARE EDUCATION/TRAINING PROGRAM

## 2025-05-13 PROCEDURE — 83735 ASSAY OF MAGNESIUM: CPT | Performed by: SURGERY

## 2025-05-13 PROCEDURE — 85049 AUTOMATED PLATELET COUNT: CPT | Performed by: STUDENT IN AN ORGANIZED HEALTH CARE EDUCATION/TRAINING PROGRAM

## 2025-05-13 PROCEDURE — 250N000011 HC RX IP 250 OP 636

## 2025-05-13 PROCEDURE — 250N000013 HC RX MED GY IP 250 OP 250 PS 637

## 2025-05-13 PROCEDURE — 250N000013 HC RX MED GY IP 250 OP 250 PS 637: Performed by: SURGERY

## 2025-05-13 PROCEDURE — 97530 THERAPEUTIC ACTIVITIES: CPT | Mod: GP

## 2025-05-13 PROCEDURE — 84132 ASSAY OF SERUM POTASSIUM: CPT | Performed by: SURGERY

## 2025-05-13 PROCEDURE — 84100 ASSAY OF PHOSPHORUS: CPT | Performed by: SURGERY

## 2025-05-13 PROCEDURE — 82565 ASSAY OF CREATININE: CPT | Performed by: STUDENT IN AN ORGANIZED HEALTH CARE EDUCATION/TRAINING PROGRAM

## 2025-05-13 PROCEDURE — 250N000011 HC RX IP 250 OP 636: Performed by: STUDENT IN AN ORGANIZED HEALTH CARE EDUCATION/TRAINING PROGRAM

## 2025-05-13 PROCEDURE — 250N000011 HC RX IP 250 OP 636: Performed by: SURGERY

## 2025-05-13 RX ORDER — MAGNESIUM SULFATE HEPTAHYDRATE 40 MG/ML
2 INJECTION, SOLUTION INTRAVENOUS ONCE
Status: COMPLETED | OUTPATIENT
Start: 2025-05-13 | End: 2025-05-13

## 2025-05-13 RX ORDER — OXYCODONE HYDROCHLORIDE 5 MG/1
5 TABLET ORAL EVERY 6 HOURS PRN
Qty: 10 TABLET | Refills: 0 | Status: SHIPPED | OUTPATIENT
Start: 2025-05-13 | End: 2025-05-15

## 2025-05-13 RX ORDER — POTASSIUM CHLORIDE 29.8 MG/ML
20 INJECTION INTRAVENOUS ONCE
Status: COMPLETED | OUTPATIENT
Start: 2025-05-13 | End: 2025-05-13

## 2025-05-13 RX ORDER — METHOCARBAMOL 500 MG/1
500 TABLET, FILM COATED ORAL 4 TIMES DAILY PRN
Qty: 30 TABLET | Refills: 0 | Status: SHIPPED | OUTPATIENT
Start: 2025-05-13

## 2025-05-13 RX ORDER — OXYCODONE HYDROCHLORIDE 5 MG/1
5 TABLET ORAL EVERY 6 HOURS PRN
Qty: 12 TABLET | Refills: 0 | Status: CANCELLED | OUTPATIENT
Start: 2025-05-13

## 2025-05-13 RX ORDER — BISACODYL 10 MG
10 SUPPOSITORY, RECTAL RECTAL ONCE
Status: COMPLETED | OUTPATIENT
Start: 2025-05-13 | End: 2025-05-13

## 2025-05-13 RX ORDER — ONDANSETRON 4 MG/1
4 TABLET, ORALLY DISINTEGRATING ORAL EVERY 8 HOURS PRN
Qty: 90 TABLET | Refills: 0 | Status: SHIPPED | OUTPATIENT
Start: 2025-05-13 | End: 2025-06-12

## 2025-05-13 RX ORDER — OXYCODONE HYDROCHLORIDE 5 MG/1
5 TABLET ORAL EVERY 6 HOURS PRN
Qty: 10 TABLET | Refills: 0 | Status: SHIPPED | OUTPATIENT
Start: 2025-05-13 | End: 2025-05-13

## 2025-05-13 RX ADMIN — METHOCARBAMOL 500 MG: 500 TABLET ORAL at 13:06

## 2025-05-13 RX ADMIN — Medication 56 MCG: at 08:54

## 2025-05-13 RX ADMIN — POTASSIUM PHOSPHATE, MONOBASIC 500 MG: 500 TABLET, SOLUBLE ORAL at 09:02

## 2025-05-13 RX ADMIN — POTASSIUM PHOSPHATE, MONOBASIC 500 MG: 500 TABLET, SOLUBLE ORAL at 13:00

## 2025-05-13 RX ADMIN — PANTOPRAZOLE SODIUM 40 MG: 40 TABLET, DELAYED RELEASE ORAL at 08:55

## 2025-05-13 RX ADMIN — Medication 1 LOZENGE: at 13:51

## 2025-05-13 RX ADMIN — CARVEDILOL 12.5 MG: 3.12 TABLET, FILM COATED ORAL at 08:55

## 2025-05-13 RX ADMIN — OXYCODONE HYDROCHLORIDE 5 MG: 5 TABLET ORAL at 11:24

## 2025-05-13 RX ADMIN — GABAPENTIN 300 MG: 300 CAPSULE ORAL at 14:55

## 2025-05-13 RX ADMIN — OXYCODONE HYDROCHLORIDE 5 MG: 5 TABLET ORAL at 05:16

## 2025-05-13 RX ADMIN — BISACODYL 10 MG: 10 SUPPOSITORY RECTAL at 07:08

## 2025-05-13 RX ADMIN — HYDROCHLOROTHIAZIDE 25 MG: 25 TABLET ORAL at 08:55

## 2025-05-13 RX ADMIN — LISINOPRIL 20 MG: 20 TABLET ORAL at 08:55

## 2025-05-13 RX ADMIN — OXYCODONE HYDROCHLORIDE 5 MG: 5 TABLET ORAL at 17:18

## 2025-05-13 RX ADMIN — ACETAMINOPHEN 650 MG: 325 TABLET ORAL at 15:59

## 2025-05-13 RX ADMIN — MAGNESIUM SULFATE HEPTAHYDRATE 2 G: 2 INJECTION, SOLUTION INTRAVENOUS at 08:54

## 2025-05-13 RX ADMIN — METHOCARBAMOL 500 MG: 500 TABLET ORAL at 07:08

## 2025-05-13 RX ADMIN — GABAPENTIN 300 MG: 300 CAPSULE ORAL at 08:55

## 2025-05-13 RX ADMIN — ENOXAPARIN SODIUM 40 MG: 40 INJECTION SUBCUTANEOUS at 10:23

## 2025-05-13 RX ADMIN — POTASSIUM CHLORIDE 20 MEQ: 29.8 INJECTION, SOLUTION INTRAVENOUS at 10:23

## 2025-05-13 RX ADMIN — Medication 5 ML: at 05:15

## 2025-05-13 ASSESSMENT — ACTIVITIES OF DAILY LIVING (ADL)
ADLS_ACUITY_SCORE: 58
ADLS_ACUITY_SCORE: 54
ADLS_ACUITY_SCORE: 58
ADLS_ACUITY_SCORE: 54
ADLS_ACUITY_SCORE: 58
ADLS_ACUITY_SCORE: 54
ADLS_ACUITY_SCORE: 54
ADLS_ACUITY_SCORE: 58
ADLS_ACUITY_SCORE: 58

## 2025-05-13 NOTE — PROGRESS NOTES
Emergency General Surgery Progress Note  5/12/2025      Subjective:   No acute event overnight. Resting comfortably in bed this AM. States that she is able to tolerate food better. She is passing gas. Denies n/v this am.     Objective:   Temp:  [98.3  F (36.8  C)-99  F (37.2  C)] 99  F (37.2  C)  Pulse:  [70-89] 82  Resp:  [18] 18  BP: (129-144)/() 138/88  SpO2:  [95 %-99 %] 99 %  I/O last 3 completed shifts:  In: 716 [P.O.:716]  Out: 550 [Urine:550]    Gen: resting comfortably in bed, NAD  CV: normal heart rate, normotensive   Pulm: Nonlabored breathing on RA  Abd: Soft, non-tender, non-distended. Midline incision closed with staples, well approximated, no drainage expressed or surrounding erythema. Laparoscopic incisions clean, dry, intact with improving ecchymosis.    Extremities: warm and well perfused, moves all extremities spontaneously. No peripheral edema  Neuro: awake and alert    Labs:  [facility]   Recent Labs   Lab 05/13/25  0506 05/12/25  0539 05/11/25  2030 05/11/25  0537 05/10/25  0404 05/09/25  1432 05/09/25  0450 05/08/25  0627 05/07/25  0609   NA  --   --   --   --   --   --  141  --  139   POTASSIUM 3.7 3.8  --  3.8 4.0  --  3.7  3.6   < > 3.5   CHLORIDE  --   --   --   --   --   --  104  --  103   RAMBO  --   --   --   --   --   --  8.3*  --  8.2*   CO2  --   --   --   --   --   --  21*  --  21*   BUN  --   --   --   --   --   --  12.7  --  12.9   CR 0.66  --   --   --  0.58  --  0.59  --  0.66   GLC  --   --   --   --   --   --  88  --  81   MAG 1.9 1.8  --  1.8 1.8  --  2.1   < > 1.8   PHOS 2.4* 2.8 2.0* 1.5* 2.4*   < > 1.8*   < > 2.1*    < > = values in this interval not displayed.     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 Imaging demonstrated incarcerated ventral hernia, prompting EGS involvement. On 5/4, patient underwent diagnostic laparoscopy converted to laparotomy with lysis of adhesions, small bowel resection, and primary closure of ventral hernia (no mesh).     Post-operatively, although patient with some return of bowel function (passing flatus), continued to have high NGT output and nausea. Nausea improving, NGT output no longer bilious as of 5/9 AM. NGT removed 5/9 and sips of clears. Tolerated with minimal nausea. Advanced to regular diet on 5/10. She is passing gas and tolerating regular diet.     Today's Changes:   - Regular diet, recommend at least 1-2 Ensures each day  - Decrease maintenance fluids to @ 50 mL/hr. Recommend increasing PO intake  - P 2.4 this am, will replace with replacement in addition to the nurse driven protocol.  - Pain Control: PO Tylenol or Rectal Tylenol q4h PRN, IV Robaxin 500mg q8h PRN, PO oxycodone 5mg q6h PRN. Lidocaine patches.    - PO Levothyroxine   - IV PPI ordered given PTA PPI   - PTA lisinopril and carvedilol at this time.  IV labetalol and hydralazine available PRN for elevated SBP >160.   - IS at bedside, encourage IS  - Encourage OOB, ambulate   - Suppository added today.   - Abdominal binder for comfort.  - Will check later this pm, if tolerating food, anticipate discharge later today.     This patient was seen and discussed with chief resident who discussed with staff    Edward Brewer MD  Urology PGY-1  General surgery service

## 2025-05-13 NOTE — PROGRESS NOTES
"1500 - 1730:   /88 (BP Location: Left arm)   Pulse 79   Temp 99.2  F (37.3  C) (Oral)   Resp 18   Ht 1.651 m (5' 5\")   Wt 71.2 kg (156 lb 15.5 oz)   LMP  (LMP Unknown)   SpO2 97%   BMI 26.12 kg/m      A&O x 4, AVSS, gave oxycodone & tylenol for lower back pain.  No nausea, ate some of her lunch.  Pt showered before discharge.  Plan to discharge home at 1730 & transporter set up to discharge pharmacy.      Discharge  D: Orders for discharge and outpatient medications written.  I: Home medications and return to clinic schedule reviewed with patient. Discharge instructions and parameters for calling Health Care Provider reviewed. Patient left at 1730 accompanied by her sister in law.  Discharge instruction educated by virtual RN. PICC removed by day RN.   A: Patient/family verbalized understanding and was ready for discharge.   P: Patient instructed to  medications in Pharmacy. Follow up as scheduled per discharge note.     "

## 2025-05-13 NOTE — PLAN OF CARE
Goal Outcome Evaluation:      Plan of Care Reviewed With: patient    Overall Patient Progress: no changeOverall Patient Progress: no change     A&Ox4. Afebrile.  VSS on RA. SBA- A1. Denies SOB, dizziness and N/V. Pain controlled w/PRN Oxycodone x1 and Robaxin x1.Dbl lumen PICC Purple- hep locked and Red- infusing Continuous D5 & 0.45% NaCl + KKCI @ 50 mL/hr.  Uses call light appropriately. External catheter in place w/ AUOP. Still no BM since 5/4/25. Will pass on to day team of getting some kind of bowel regimen in place to help with this. Did give a suppository dulcolax this AM. Able to make needs known. No acute events during shift. Contact precautions maintained.         Significant 24 hour events:     Level of Care: Acute    Summary Type: 5A Post Op Report   POD: #9 = 5/13 LAPAROSCOPY, DIAGNOSTIC with lysis of adhesions, converted to open laparotomy with small bowel resection  x1, N/A - Abdomen   Complications:    Pain:  Controlled Pain managed by prn Tylenol and Oxycodone.   Neuro:WDL  CV:WDL  Resp:WDL  GI: WDL except nausea intermittently and no BM  :.WDL except - has external catheter in place and has been incontinent frequently  Skin: .WDL except surgical site & large L abd side hematoma  Activity Assistance: assistance, 1 person   Safety/Falls Risk: Level of Risk: High Risk  Consults: PT/OT  Procedures/Imaging:   Precautions: Isolation Precautions-MRSA contact    Continue to monitor and follow POC           Giselle Bray, RN......5/13/2025 4:32 AM

## 2025-05-13 NOTE — PLAN OF CARE
Goal Outcome Evaluation:      Plan of Care Reviewed With: patient    Overall Patient Progress: improvingOverall Patient Progress: improving    Outcome Evaluation: 2661-4980. Pt A&Ox4, VSS on RA. Incision CDI, staples removed this afternoon by surgery. Tolerating regular diet without nausea. Pain controlled with PRN oxycodone, robaxin, and tylenol. Lidocaine patch removed this AM. Large BM following suppository this AM. Plan for pt to discharge this afternoon. AVS printed, discharge reviewed with virtual RN. PICC removed. Discharge medications, including oxycodone script sent to discharge pharmacy. Plan for pt to discharge via ride from [facility].

## 2025-05-13 NOTE — PROGRESS NOTES
Care Management Discharge Note    Discharge Date: 05/13/2025       Discharge Disposition: Home     Discharge Services: Home Care, PT    Discharge DME: None    Discharge Transportation: family    Private pay costs discussed: Not applicable    Does the patient's insurance plan have a 3 day qualifying hospital stay waiver?  No    PAS Confirmation Code:  n/a   Patient/family educated on Medicare website which has current facility and service quality ratings: yes    Education Provided on the Discharge Plan:  yes   Persons Notified of Discharge Plans: Pt   Patient/Family in Agreement with the Plan:  yes     Handoff Referral Completed: Yes, [facility] PCP: Internal handoff referral completed    Additional Information:  RNCC notified Pt is medically ready for discharge. Pt will discharge home with home PT. Orders placed and faxed to agency.    [facility]  PT    Family will transport home.    No further discharge needs.     ORQUIDEA Lee  Care Management Department  Covering 5A: 2334-7484 & 5C: 0034-1293 (non-BMT)   Phone: [phone]  Teams  Vocera: weekdays 8:00 am - 4:30 pm.   See Vocera Care Team for off-day coverage

## 2025-05-14 ENCOUNTER — PATIENT OUTREACH (OUTPATIENT)
Dept: SURGERY | Facility: CLINIC | Age: 75
End: 2025-05-14

## 2025-05-14 ENCOUNTER — OFFICE VISIT (OUTPATIENT)
Dept: PLASTIC SURGERY | Facility: CLINIC | Age: 75
End: 2025-05-14
Payer: COMMERCIAL

## 2025-05-14 ENCOUNTER — PATIENT OUTREACH (OUTPATIENT)
Dept: CARE COORDINATION | Facility: CLINIC | Age: 75
End: 2025-05-14

## 2025-05-14 VITALS
OXYGEN SATURATION: 97 % | DIASTOLIC BLOOD PRESSURE: 86 MMHG | BODY MASS INDEX: 26.09 KG/M2 | HEIGHT: 65 IN | SYSTOLIC BLOOD PRESSURE: 117 MMHG | HEART RATE: 102 BPM | WEIGHT: 156.6 LBS

## 2025-05-14 DIAGNOSIS — H61.301: Primary | ICD-10-CM

## 2025-05-14 PROCEDURE — 3074F SYST BP LT 130 MM HG: CPT | Performed by: STUDENT IN AN ORGANIZED HEALTH CARE EDUCATION/TRAINING PROGRAM

## 2025-05-14 PROCEDURE — 3079F DIAST BP 80-89 MM HG: CPT | Performed by: STUDENT IN AN ORGANIZED HEALTH CARE EDUCATION/TRAINING PROGRAM

## 2025-05-14 PROCEDURE — 99213 OFFICE O/P EST LOW 20 MIN: CPT | Performed by: STUDENT IN AN ORGANIZED HEALTH CARE EDUCATION/TRAINING PROGRAM

## 2025-05-14 PROCEDURE — 1125F AMNT PAIN NOTED PAIN PRSNT: CPT | Performed by: STUDENT IN AN ORGANIZED HEALTH CARE EDUCATION/TRAINING PROGRAM

## 2025-05-14 ASSESSMENT — PAIN SCALES - GENERAL: PAINLEVEL_OUTOF10: MODERATE PAIN (5)

## 2025-05-14 NOTE — NURSING NOTE
"Chief Complaint   Patient presents with    RECHECK     Pre-op consult, DOS 6/4/25.       Vitals:    05/14/25 1449   BP: 117/86   BP Location: Right arm   Patient Position: Sitting   Cuff Size: Adult Regular   Pulse: 102   SpO2: 97%   Weight: 71 kg (156 lb 9.6 oz)   Height: 1.651 m (5' 5\")       Body mass index is 26.06 kg/m .    Patient reports moderate low back pain (5/10).    Adi Anton, EMT    "

## 2025-05-14 NOTE — PROGRESS NOTES
05/14/25    9:52 AM     Attempted to reach the patient for post procedure/discharge hospital call.  No answer, LM on  to call office.    05/14/25    10:03 AM     RN Post-Op/Post-Discharge Care Coordination Note    Ms. Nadira Perez is a 74 year old female who underwent diagnostics laparoscopy, MERLENE, primary repair of a ventral hernia, and small bowel resection on 5/4 on Dr. Cintron. She was recently discharged from the hospital. Spoke with Patient.    Support  Family member assisting with care     Health Status  Nausea/Vomiting: Patient reports emesis x 1 this am and has since resolved. Patient reports a lack of appetite.  Eating/drinking: Patient is able to eat and drink without any complaints. Recommended small, frequent meals high in protein.  Diet:  Regular  Bowel habits: Patient reports having a normal bowel movement.  Drains (RADHA): N/A  Fevers/chills: Patient denies any fever or chills.  Incisions: Patient denies any signs and symptoms of infection..  Wound closure:  Staples- removed prior to discharge  Pain: Improved.  Dealing with chronic back pain. Medicating with Oxycodone PRN.  New Medications:  Zofran, Oxycodone, Robaxin    Activity/Restrictions  No lifting in excess of 15-20 pounds for 3-4 weeks    Equipment  Abdominal binder    Pathology reviewed with patient:  Already done    Forms/Letters  No    All of her questions were answered including reviewing restrictions and wound care.  She will call this office if she has any further questions and/or concerns.      Appointment 5/19 at 1300 with Dr. Cintron.      Whom and When to Call  Patient acknowledges understanding of how to manage any medication changes and   when to seek medical care.     Patient advised that if after hour medical concerns arise to please call [phone] and choose option 4 to speak to the physician on call.

## 2025-05-14 NOTE — LETTER
5/14/2025       RE: Nadira Perez  [address]  [facility] [address]     Dear Colleague,    Thank you for referring your patient, Nadira Perez, to the [facility]ERY [facility] [place] at [facility]. Please see a copy of my visit note below.    PRS    Patient returns for preoperative visit. She had an emergent surgery for bowel obstruction within the last month or so. She is recovering well.     On exam, no changes in the constricted R ear with still difficulty with maintaining the hearing aid and glasses over the R superior helix.     A/P: 74F p/f preop visit prior to R revision otoplasty     -Discussed plan for surgery. Explained that we will plan to build-up the right superior auricular helix with cartilage graft from the left ear. We can make an incision on the back or hidden in the front of the left conchal bowl. Explained that she may still have difficulty with proper seating of the hearing aid or glasses, but it is worth trying surgery. Patient understands and is agreeable to the plan.   -Discussed the risks of surgery, including but not limited to: infection, bleeding, pain, poor scarring, wound healing issues, need for revision or additional surgery, persistent constricted ear deformity, persistent inability to fully seat the hearing aid or glasses over the right ear, anesthesia-related complication, hematoma, suboptimal aesthetic result, asymmetries, DVT/PE, death. Despite these risks, patient consents to RIGHT revision functional otoplasty with use of cartilage graft from the contralateral ear. All questions answered.   -Photography today  -If the patient struggles with laying flat on operating table or is still recovering from abdominal surgery by early June, we can postpone our operation. Patient is agreeable to the plan.   -A total of 20 minutes was devoted to review of chart, direct face-to-face  patient counseling and documentation during and on the day of this encounter, exclusive of any procedure performed.    Juan Agudelo MD, PhD, FACS        Again, thank you for allowing me to participate in the care of your patient.      Sincerely,    Juan Agudelo MD

## 2025-05-14 NOTE — PLAN OF CARE
Physical Therapy Discharge Summary    Reason for therapy discharge:    Discharged to home with home therapy.    Progress towards therapy goal(s). See goals on Care Plan in [facility] electronic health record for goal details.  Goals partially met.  Barriers to achieving goals:   discharge from facility.    Therapy recommendation(s):    Continued therapy is recommended.  Rationale/Recommendations:  Recommend pt continue improving remaining impairments with  PT.

## 2025-05-14 NOTE — PROGRESS NOTES
Clinic Care Coordination Contact  Situation: Patient chart reviewed by RN.    Background: Patient identified via recently discharged list.     Assessment: Per chart review, patient is being outreached by General Surgery RN for post hospital discharge call.    Plan/Recommendations: RN will not complete TCM call due to duplication of outreach.    GRZEGORZ BRAVO RN on 5/14/2025 at 11:18 AM

## 2025-05-15 ENCOUNTER — MEDICAL CORRESPONDENCE (OUTPATIENT)
Dept: HEALTH INFORMATION MANAGEMENT | Facility: CLINIC | Age: 75
End: 2025-05-15

## 2025-05-15 NOTE — PATIENT INSTRUCTIONS
Thank you for coming to the [facility] Plastic and Reconstructive Surgery Department in [place]; please note the following instructions:    Please complete a pre-op physical and see us in surgery as scheduled, on 6/4, and your post op on 6/13        If you have any questions regarding your visit, please contact your care team:     LEILA Keating    As always, thank you for trusting us with your health care needs!

## 2025-05-15 NOTE — NURSING NOTE
To Do    Pt to schedule and complete her pre-op visit  Pt to complete surgery and post op as scheduled on 6/4 and 6/13    Zuri Murry RN

## 2025-05-17 NOTE — PROGRESS NOTES
PRS    Patient returns for preoperative visit. She had an emergent surgery for bowel obstruction within the last month or so. She is recovering well.     On exam, no changes in the constricted R ear with still difficulty with maintaining the hearing aid and glasses over the R superior helix.     A/P: 74F p/f preop visit prior to R revision otoplasty     -Discussed plan for surgery. Explained that we will plan to build-up the right superior auricular helix with cartilage graft from the left ear. We can make an incision on the back or hidden in the front of the left conchal bowl. Explained that she may still have difficulty with proper seating of the hearing aid or glasses, but it is worth trying surgery. Patient understands and is agreeable to the plan.   -Discussed the risks of surgery, including but not limited to: infection, bleeding, pain, poor scarring, wound healing issues, need for revision or additional surgery, persistent constricted ear deformity, persistent inability to fully seat the hearing aid or glasses over the right ear, anesthesia-related complication, hematoma, suboptimal aesthetic result, asymmetries, DVT/PE, death. Despite these risks, patient consents to RIGHT revision functional otoplasty with use of cartilage graft from the contralateral ear. All questions answered.   -Photography today  -If the patient struggles with laying flat on operating table or is still recovering from abdominal surgery by early June, we can postpone our operation. Patient is agreeable to the plan.   -A total of 20 minutes was devoted to review of chart, direct face-to-face patient counseling and documentation during and on the day of this encounter, exclusive of any procedure performed.    Juan Agudelo MD, PhD, FACS

## 2025-05-19 ENCOUNTER — OFFICE VISIT (OUTPATIENT)
Dept: SURGERY | Facility: CLINIC | Age: 75
End: 2025-05-19
Payer: COMMERCIAL

## 2025-05-19 VITALS
SYSTOLIC BLOOD PRESSURE: 120 MMHG | WEIGHT: 162 LBS | BODY MASS INDEX: 26.99 KG/M2 | TEMPERATURE: 98.7 F | OXYGEN SATURATION: 97 % | DIASTOLIC BLOOD PRESSURE: 77 MMHG | HEART RATE: 75 BPM | HEIGHT: 65 IN

## 2025-05-19 DIAGNOSIS — G89.29 CHRONIC BILATERAL LOW BACK PAIN, UNSPECIFIED WHETHER SCIATICA PRESENT: Primary | ICD-10-CM

## 2025-05-19 DIAGNOSIS — K43.6: ICD-10-CM

## 2025-05-19 DIAGNOSIS — M54.50 CHRONIC BILATERAL LOW BACK PAIN, UNSPECIFIED WHETHER SCIATICA PRESENT: Primary | ICD-10-CM

## 2025-05-19 PROCEDURE — 3074F SYST BP LT 130 MM HG: CPT | Performed by: SURGERY

## 2025-05-19 PROCEDURE — 1126F AMNT PAIN NOTED NONE PRSNT: CPT | Performed by: SURGERY

## 2025-05-19 PROCEDURE — 3078F DIAST BP <80 MM HG: CPT | Performed by: SURGERY

## 2025-05-19 PROCEDURE — 99024 POSTOP FOLLOW-UP VISIT: CPT | Performed by: SURGERY

## 2025-05-19 RX ORDER — METHOCARBAMOL 500 MG/1
500 TABLET, FILM COATED ORAL EVERY 8 HOURS PRN
Qty: 30 TABLET | Refills: 0 | Status: SHIPPED | OUTPATIENT
Start: 2025-05-19

## 2025-05-19 ASSESSMENT — PAIN SCALES - GENERAL: PAINLEVEL_OUTOF10: NO PAIN (0)

## 2025-05-19 NOTE — PATIENT INSTRUCTIONS
You met with Dr. Jacobo Cintron.      Today's visit instructions:    Return to the Surgery Clinic in 6 months to see Dr. Cintron.  You can call [phone].     If you have questions please contact Miesha RN or Mitali RN during regular clinic hours, Monday through Friday 7:30 AM - 4:00 PM, or you can contact us via [employer] at anytime.       If you have urgent needs after-hours, weekends, or holidays please call the hospital at [phone] and ask to speak with our on-call General Surgery Team.    Appointment [phone]  Nurse Advice (Miesha or Mitali): [phone]   Surgery Scheduler (Lorena): [phone]  Billing Customer Service:  [phone]  Fax: [phone]

## 2025-05-19 NOTE — LETTER
5/19/2025       RE: Nadira Perez  [id number]  [facility] [id number]     Dear Colleague,    Thank you for referring your patient, Nadira Perez, to the [facility] [place] [facility]. Please see a copy of my visit note below.      HISTORY     Chief Complaint   Patient presents with     Post-Op - General Surgery     PO     HISTORY OF PRESENT ILLNESS: Nadira Perez is a 74 year old female with a past medical history as noted below, presents for follow up after diagnostic laparoscopy converted to Open laparotomy with small bowel resection and primary repair of a ventral hernia surgery. Doing well. Tolerating diet and having bowel movements. 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     Thyroid disease      Past Surgical History:   Procedure Laterality Date     ABDOMEN SURGERY      ovarian cyst, mesh     ARTHRODESIS WRIST Right      ARTHRODESIS WRIST  02/14/2013    Procedure: ARTHRODESIS WRIST;  left wrist scaphoid excision, four bone fusion, iliac crest bone graft  ( Mac with block);  Surgeon: Av Mendez MD;  [facility]     BIOPSY      skin, vaginal     BLEPHAROPLASTY BILATERAL Bilateral 05/06/2022    Procedure: UPPER BLEPHAROPLASTY, BILATERAL;  Surgeon: Jemal Sanchez MD;  [place] [facility] [place]     CATARACT IOL, RT/LT Right 03/13/2018     CATARACT IOL,  RT/LT Left 02/20/2018     COLONOSCOPY  12/24/2013    Procedure: COMBINED COLONOSCOPY, SINGLE BIOPSY/POLYPECTOMY BY BIOPSY;  COLONOSCOPY;  Surgeon: Dom Alvarez MD;  Location: [facility]     COLONOSCOPY N/A 03/12/2024    Procedure: COLONOSCOPY, FLEXIBLE, WITH LESION REMOVAL USING SNARE;  Surgeon: Amina Espinoza MD;  Location: [facility]     COSMETIC BLEPHAROPLASTY LOWER LIDS BILATERAL Bilateral 05/06/2022    Procedure: BLEPHAROPLASTY, LOWER EYELID, BILATERAL, COSMETIC;  Surgeon: Jemal Sanchez MD;  [place] [facility] [place]     COSMETIC SURGERY       ESOPHAGOSCOPY, GASTROSCOPY, DUODENOSCOPY (EGD), COMBINED N/A 11/23/2016    Procedure: COMBINED ESOPHAGOSCOPY, GASTROSCOPY, DUODENOSCOPY (EGD);  Surgeon: Quinten Feliciano MD;  Location: [facility]     ESOPHAGOSCOPY, GASTROSCOPY, DUODENOSCOPY (EGD), COMBINED N/A 03/12/2024    Procedure: ESOPHAGOGASTRODUODENOSCOPY, WITH BIOPSY;  Surgeon: Amina Espinoza MD;  Location: [facility]     EXTERNAL EAR SURGERY      right     EYE SURGERY      radial keratomy     FUSION, SPINE, INTERBODY, OBLIQUE ANTERIOR AND LUMBAR, 2 LEVELS, POST APPROACH, USING OTS N/A 11/18/2021    Procedure: Part 1: Oblique Anterior Interbody Fusion at Lumbar 5 to sacral 1 with use of Bone Morphogenic Protein,;  Surgeon: Serge Ramirez MD;  Location:  OR     GI SURGERY      Umbilical hernia repair     GRAFT BONE FROM ILIAC CREST  02/14/2013    Procedure: GRAFT BONE FROM ILIAC CREST;  mac with block and local infilitration;  Surgeon: Av Mendez MD;  Location:  OR      BREATH HYDROGEN TEST N/A 10/14/2016    Procedure: HYDROGEN BREATH TEST;  Surgeon: Cheri Barron MD;  Location:  GI     HERNIA REPAIR      umbilical age 18 mos.     HYSTERECTOMY TOTAL ABDOMINAL  05/03/2000     INJECT EPIDURAL CAUDAL N/A 09/12/2023    Procedure: Caudal epidural steroid injection with fluoroscopy;  Surgeon: Natasha Umaña MD;  [place] [facility] [place]     INJECT EPIDURAL CAUDAL N/A 01/02/2024    Procedure:  INJECTION, EPIDURAL, CAUDAL;  Surgeon: Natasha Umaña MD;  Location: [facility] OR     INJECT EPIDURAL LUMBAR N/A 05/23/2023    Procedure: L3-4 interlaminar epidural steroid injection with fluoroscopy ( left> right);  Surgeon: Natasha Umaña MD;  Location: [facility] OR     INJECT JOINT SACROILIAC Left 04/27/2023    Procedure: Left sacroiliac joint steroid injection with fluoroscopy;  Surgeon: Natasha Umaña MD;  Location: [facility] OR     INJECT JOINT SACROILIAC Bilateral 10/02/2024    Procedure: Bilateral sacroiliac joint injection;  Surgeon: Thais Saldivar MD;  Location: [facility] OR     INJECT SACROILIAC JOINT Bilateral 02/26/2025    Procedure: Bilateral sacroiliac joint steroid injections;  Surgeon: Thais Saldivar MD;  Location: [facility] OR     LAPAROSCOPY DIAGNOSTIC (GENERAL) N/A 05/04/2025    Procedure: LAPAROSCOPY, DIAGNOSTIC with lysis of adhesions, converted to;  Surgeon: Jacobo Cintron DO;  Location: [facility] OR     MASTECTOMY MODIFIED RADICAL Bilateral     bilateral; right breast prophylactic     OPTICAL TRACKING SYSTEM FUSION POSTERIOR SPINE LUMBAR N/A 11/18/2021    Procedure: Open Posterior Instrumented Spinal Fusion at Lumbar 5 to Sacral 1, with grimm aguayo osteotomy, use of O-Arm/Stealth;  Surgeon: Serge Ramirez MD;  [facility]     OTOPLASTY Bilateral 07/25/2024    Procedure: Right Revision Functional Otoplasty;  Surgeon: Juan Agudelo MD;  Location: [facility] OR     PARATHYROIDECTOMY  09/23/2004    R SUPERIOR     PARATHYROIDECTOMY  09/23/2004    parathyroid resection, subtotal     PICC INSERTION - DOUBLE LUMEN Right 05/08/2025    38-1cm, Basilic vein     RELEASE CARPAL TUNNEL Right 12/02/2021    Procedure: RIGHT CARPAL TUNNEL RELEASE, RIGHT WRIST HARDWARE REMOVAL, RIGHT CARPAL BOSS EXCISION;  Surgeon: Rolan Conley MD;  Location: [facility] OR     REMOVE HARDWARE HAND  09/24/2013    Procedure: REMOVE HARDWARE HAND;  Left Hand Screw Removal        RESECT SMALL BOWEL WITHOUT OSTOMY N/A 05/04/2025     Procedure: open laparotomy with small bowel resection x1;  Surgeon: Jacobo Cintron DO;  Location: [facility] OR     RHINOPLASTY  1968     thyr proc skin closed cosmetic manner by subcuticular stitch  01/23/2009     THYROPLASTY  10/09/2009     TONSILLECTOMY  1977     VITRECTOMY PARSPLANA WITH 25 GAUGE SYSTEM Left 06/20/2022    Procedure: LEFT EYE VITRECTOMY, PARS PLANA APPROACH, USING 25-GAUGE INSTRUMENTS, laser;  Surgeon: Felix Carlos MD;  Location: [facility] OR     WRIST SURGERY      wrist arthrodesis       MEDICATIONS AND ALLERGIES  Allergies   Allergen Reactions     Erythromycin Nausea     Penicillins Hives     Around age 6 - doesn't recall full reaction, mother told her it was hives.     Has tolerated cephalsporins.        Current Outpatient Medications:      acetaminophen (TYLENOL) 500 MG tablet, Take 1,000 mg by mouth 3 times daily, Disp: , Rfl:      artificial tears OINT ophthalmic ointment, Place into both eyes nightly as needed for dry eyes., Disp: , Rfl:      atorvastatin (LIPITOR) 80 MG tablet, Take 1 tablet (80 mg) by mouth daily., Disp: 90 tablet, Rfl: 3     carvedilol (COREG) 12.5 MG tablet, Take 1 tablet (12.5 mg) by mouth 2 times daily (with meals)., Disp: 180 tablet, Rfl: 3     CRANBERRY EXTRACT PO, Take 500 mg by mouth every morning, Disp: , Rfl:      diclofenac (VOLTAREN) 1 % topical gel, Apply 2 g topically 4 times daily, Disp: 100 g, Rfl: 6     gabapentin (NEURONTIN) 300 MG capsule, Take 4 capsules (1,200 mg) by mouth 3 times daily., Disp: 1080 capsule, Rfl: 3     HEMP OIL OR EXTRACT OR OTHER CBD CANNABINOID, NOT MEDICAL CANNABIS,, THC, Disp: , Rfl:      hydrochlorothiazide (HYDRODIURIL) 25 MG tablet, Take 1 tablet (25 mg) by mouth daily., Disp: 90 tablet, Rfl: 3     levothyroxine (SYNTHROID/LEVOTHROID) 112 MCG tablet, TAKE 1/2 TAB BY MOUTH EVERY DAY, Disp: 45 tablet, Rfl: 3     lisinopril (ZESTRIL) 20 MG tablet, Take 1 tablet (20 mg) by mouth 2 times daily., Disp: 180  tablet, Rfl: 3     MAGNESIUM GLYCINATE PO, Take 800 mg by mouth at bedtime., Disp: , Rfl:      Melatonin 10 MG TABS tablet, Take 10 mg by mouth nightly as needed., Disp: , Rfl:      methocarbamol (ROBAXIN) 500 MG tablet, Take 1 tablet (500 mg) by mouth every 8 hours as needed for muscle spasms., Disp: 30 tablet, Rfl: 0     methocarbamol (ROBAXIN) 500 MG tablet, Take 1 tablet (500 mg) by mouth 4 times daily as needed for muscle spasms., Disp: 30 tablet, Rfl: 0     Multiple Vitamin (MULTI-VITAMIN) per tablet, Take 1 tablet by mouth every morning, Disp: , Rfl:      omeprazole (PRILOSEC) 20 MG DR capsule, Take 1 capsule (20 mg) by mouth daily., Disp: 90 capsule, Rfl: 3     ondansetron (ZOFRAN ODT) 4 MG ODT tab, Take 1 tablet (4 mg) by mouth every 8 hours as needed for nausea., Disp: 90 tablet, Rfl: 0     ondansetron (ZOFRAN) 4 MG tablet, Take 1 tablet (4 mg) by mouth every 6 hours as needed for nausea., Disp: 12 tablet, Rfl: 3     ondansetron (ZOFRAN-ODT) 4 MG ODT tab, Take 1 tablet (4 mg) by mouth every 12 hours as needed for nausea, Disp: 180 tablet, Rfl: 3     oxyCODONE (ROXICODONE) 5 MG tablet, Take 1 tablet (5 mg) by mouth 3 times daily as needed for severe pain., Disp: 40 tablet, Rfl: 0     spironolactone (ALDACTONE) 25 MG tablet, Take 1 tablet (25 mg) by mouth daily at 2 pm. (Patient taking differently: Take 50 mg by mouth daily at 2 pm.), Disp: 90 tablet, Rfl: 3     tiZANidine (ZANAFLEX) 4 MG tablet, Take 1 tablet (4 mg) by mouth at bedtime., Disp: 90 tablet, Rfl: 3     Turmeric 500 MG CAPS, Take 500 mg by mouth every morning, Disp: , Rfl:     SOCIAL HISTORY  Social History     Socioeconomic History     Marital status:      Spouse name: Not on file     Number of children: Not on file     Years of education: Not on file     Highest education level: Not on file   Occupational History     Not on file   Tobacco Use     Smoking status: Never     Passive exposure: Never     Smokeless tobacco: Never   Vaping  Use     Vaping status: Never Used   Substance and Sexual Activity     Alcohol use: Yes     Alcohol/week: 7.0 standard drinks of alcohol     Types: 7 Glasses of wine per week     Comment: Rare if ever     Drug use: Yes     Types: Marijuana     Comment: Smoke through water filter for chronic back pain PRN.     Sexual activity: Not Currently     Partners: Male     Birth control/protection: Post-menopausal, Female Surgical, None   Other Topics Concern      Service No     Blood Transfusions Not Asked     Caffeine Concern No     Occupational Exposure No     Hobby Hazards No     Sleep Concern No     Stress Concern No     Weight Concern No     Special Diet No     Back Care No     Exercise Yes     Comment: walks 4-6x  week for 20-30 min. each     Bike Helmet Not Asked     Seat Belt Yes     Self-Exams Yes     Parent/sibling w/ CABG, MI or angioplasty before 65F 55M? Yes     Comment: Father   Social History Narrative    .  Recently retired. 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        She lives with a renter.         She exercises 50 minutes three times a week.     Social Drivers of Health     Financial Resource Strain: Low Risk  (5/7/2025)    Financial Resource Strain      Within the past 12 months, have you or your family members you live with been unable to get utilities (heat, electricity) when it was really needed?: No   Food Insecurity: Low Risk  (5/7/2025)    Food Insecurity      Within the past 12 months, did you worry that your food would run out before you got money to buy more?: No      Within the past 12 months, did the food you bought just not last and you didn t have money to get more?: No   Transportation Needs: Low Risk  (5/7/2025)    Transportation Needs      Within the past 12 months, has lack of transportation kept you from medical appointments, getting your medicines, non-medical meetings or appointments, work, or  from getting things that you need?: No   Physical Activity: Not on file   Stress: Not on file   Social Connections: Not on file   Interpersonal Safety: Low Risk  (5/4/2025)    Interpersonal Safety      Do you feel physically and emotionally safe where you currently live?: Yes      Within the past 12 months, have you been hit, slapped, kicked or otherwise physically hurt by someone?: No      Within the past 12 months, have you been humiliated or emotionally abused in other ways by your partner or ex-partner?: No   Housing Stability: Low Risk  (5/7/2025)    Housing Stability      Do you have housing? : Yes      Are you worried about losing your housing?: No        FAMILY HISTORY  Family History   Problem Relation Age of Onset     Neurologic Disorder Mother         Anuerysm of Cerebral Artery, Dementia     Diabetes Mother      Thyroid Disease Mother         Hyperthyroidism (goiter)     Cerebrovascular Disease Mother         Hemorrhagic     Dementia Mother      Osteoporosis Mother      Hyperlipidemia Mother      Heart Disease Father         AAA     Hypertension Father      Coronary Artery Disease Father      Hyperlipidemia Father      Mental Illness Father         Schizophrenia?     Dementia Brother         hydrocephalis     Circulatory Brother         Perihperal Neurophathy     Peripheral Neuropathy Brother      Peripheral Neuropathy Brother      Diabetes Maternal Grandmother         Presumed Type 2     Asthma Maternal Grandmother      Chronic Obstructive Pulmonary Disease Maternal Grandfather         father     Asthma Maternal Grandfather      Diabetes Maternal Aunt         x2     Melanoma Maternal Aunt      Glaucoma Maternal Aunt      Breast Cancer Cousin      Breast Cancer Cousin      Dementia Other      Cancer Other         malignant melanoma     Hypertension Other      Hypertension Other      Cerebrovascular Disease Other      Cerebrovascular Disease Other      Obesity Other      Respiratory Other      Cancer  "Other      Diabetes Other      Asthma Other      Other Cancer Other         Malignant melanoma     Obesity Other      Macular Degeneration No family hx of      Kidney Disease No family hx of      Thrombosis No family hx of      Arthritis No family hx of      Depression No family hx of      Substance Abuse No family hx of      Cystic Fibrosis No family hx of      Early Death No family hx of      Coronary Artery Disease Early Onset No family hx of      Heart Failure No family hx of      Bleeding Diathesis No family hx of      Ovarian Cancer No family hx of      Uterine Cancer No family hx of      Prostate Cancer No family hx of      Colorectal Cancer No family hx of      Pancreatic Cancer No family hx of      Lung Cancer No family hx of      Autoimmune Disease No family hx of      Unknown/Adopted No family hx of      Genetic Disorder No family hx of      Bleeding Disorder No family hx of      Clotting Disorder No family hx of      Anesthesia Reaction No family hx of        EXAMINATION     Vitals: /77 (BP Location: Right arm, Patient Position: Sitting, Cuff Size: Adult Regular)   Pulse 75   Temp 98.7  F (37.1  C)   Ht 1.651 m (5' 5\")   Wt 73.5 kg (162 lb)   LMP  (LMP Unknown)   SpO2 97%   BMI 26.96 kg/m    BMI: Body mass index is 26.96 kg/m .    GENERAL/PSYCH: Patient is awake, A&Ox3, NAD, stable mood, good judgement and insight.  HEAD: Atraumatic, Normocephalic  EYES: Anicteric. Pupils equal and reactive  NECK: No masses/LNs. Trachea midline.  CHEST: Symmetrical, Respiratory effort WNL, no stridor.  HEART: Regular Rate and Rhythm.   ABDOMEN: Soft, non distended with minimal tenderness  INCISION: healing well, no drainage with minimal pain  LOWER EXTREMITIES: No gross deformity. Pulses palpable and equal bilaterally.  SKIN: No visible generalized rash.       ASSESSMENT & PLAN   Obstruction concurrent with and due to hernia of abdominal wall  S/p diagnostic laparoscopy converted to Open laparotomy with " small bowel resection and primary repair of a ventral hernia.     Tolerating diet and return of normal bowel function.  Abdominal pain resolved but continues to have exacerbated chronic back pain.    Will renew robaxin script today while she waits for her appointment with pain management.   Discussion regarding risk of biliary stasis and jaundice with prolonged robaxin use. Patient states understanding.     Discussed pathology report.  Follow up as needed.  Follow up with PCP.  Author: Jacobo iCntron DO 5/19/2025 1:25 PM  Patient's Primary Care Provider: Matilde Quiñonez, thank you for allowing me to participate in the care of your patient.      Sincerely,    Jacobo Cintron DO

## 2025-05-19 NOTE — ASSESSMENT & PLAN NOTE
S/p diagnostic laparoscopy converted to Open laparotomy with small bowel resection and primary repair of a ventral hernia.     Tolerating diet and return of normal bowel function.  Abdominal pain resolved but continues to have exacerbated chronic back pain.    Will renew robaxin script today while she waits for her appointment with pain management.   Discussion regarding risk of biliary stasis and jaundice with prolonged robaxin use. Patient states understanding.

## 2025-05-19 NOTE — PROGRESS NOTES
HISTORY     Chief Complaint   Patient presents with    Post-Op - General Surgery     PO     HISTORY OF PRESENT ILLNESS: Nadira Perez is a 74 year old female with a past medical history as noted below, presents for follow up after diagnostic laparoscopy converted to Open laparotomy with small bowel resection and primary repair of a ventral hernia surgery. Doing well. Tolerating diet and having bowel movements. 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    Thyroid disease      Past Surgical History:   Procedure Laterality Date    ABDOMEN SURGERY      ovarian cyst, mesh    ARTHRODESIS WRIST Right     ARTHRODESIS WRIST  02/14/2013    Procedure: ARTHRODESIS WRIST;  left wrist scaphoid excision, four bone fusion, iliac crest bone graft  ( Mac with block);  Surgeon: Av Mendez MD;  [facility]    BIOPSY      skin, vaginal    BLEPHAROPLASTY BILATERAL Bilateral 05/06/2022    Procedure: UPPER BLEPHAROPLASTY, BILATERAL;  Surgeon: Jemal Sanchez MD;  Location: [facility] OR    CATARACT IOL, RT/LT Right 03/13/2018    CATARACT IOL, RT/LT Left 02/20/2018    COLONOSCOPY  12/24/2013    Procedure: COMBINED COLONOSCOPY, SINGLE BIOPSY/POLYPECTOMY BY BIOPSY;  COLONOSCOPY;  Surgeon: Dom Alvarez MD;  Location:  GI    COLONOSCOPY N/A 03/12/2024    Procedure: COLONOSCOPY, FLEXIBLE, WITH LESION REMOVAL USING SNARE;  Surgeon: Amina Espinoza MD;  Location:  GI    COSMETIC BLEPHAROPLASTY  LOWER LIDS BILATERAL Bilateral 05/06/2022    Procedure: BLEPHAROPLASTY, LOWER EYELID, BILATERAL, COSMETIC;  Surgeon: Jemal Sanchez MD;  Location: [facility] OR    COSMETIC SURGERY      ESOPHAGOSCOPY, GASTROSCOPY, DUODENOSCOPY (EGD), COMBINED N/A 11/23/2016    Procedure: COMBINED ESOPHAGOSCOPY, GASTROSCOPY, DUODENOSCOPY (EGD);  Surgeon: Quinten Feliciano MD;  Location:  GI    ESOPHAGOSCOPY, GASTROSCOPY, DUODENOSCOPY (EGD), COMBINED N/A 03/12/2024    Procedure: ESOPHAGOGASTRODUODENOSCOPY, WITH BIOPSY;  Surgeon: Amina Espinoza MD;  Location:  GI    EXTERNAL EAR SURGERY      right    EYE SURGERY      radial keratomy    FUSION, SPINE, INTERBODY, OBLIQUE ANTERIOR AND LUMBAR, 2 LEVELS, POST APPROACH, USING OTS N/A 11/18/2021    Procedure: Part 1: Oblique Anterior Interbody Fusion at Lumbar 5 to sacral 1 with use of Bone Morphogenic Protein,;  Surgeon: Serge Ramirez MD;  [facility]    GI SURGERY      Umbilical hernia repair    GRAFT BONE FROM ILIAC CREST  02/14/2013    Procedure: GRAFT BONE FROM ILIAC CREST;  mac with block and local infilitration;  Surgeon: Av Mendez MD;  [facility]     BREATH HYDROGEN TEST N/A 10/14/2016    Procedure: HYDROGEN BREATH TEST;  Surgeon: Cheri Barron MD;  Location:  GI    HERNIA REPAIR      umbilical age 18 mos.    HYSTERECTOMY TOTAL ABDOMINAL  05/03/2000    INJECT EPIDURAL CAUDAL N/A 09/12/2023    Procedure: Caudal epidural steroid injection with fluoroscopy;  Surgeon: Natasha Umaña MD;  Location: [facility] OR    INJECT EPIDURAL CAUDAL N/A 01/02/2024    Procedure: INJECTION, EPIDURAL, CAUDAL;  Surgeon: Natasha Umaña MD;  Location: [facility] OR    INJECT EPIDURAL LUMBAR N/A 05/23/2023    Procedure: L3-4 interlaminar epidural steroid injection with fluoroscopy ( left> right);  Surgeon: Natasha Umaña MD;  Location: [facility] OR    INJECT JOINT SACROILIAC Left 04/27/2023    Procedure: Left sacroiliac joint steroid injection with fluoroscopy;   Surgeon: Natasha Umaña MD;  Location: [facility] OR    INJECT JOINT SACROILIAC Bilateral 10/02/2024    Procedure: Bilateral sacroiliac joint injection;  Surgeon: Thais Saldivar MD;  Location: [facility] OR    INJECT SACROILIAC JOINT Bilateral 02/26/2025    Procedure: Bilateral sacroiliac joint steroid injections;  Surgeon: Thais Saldivar MD;  Location: [facility] OR    LAPAROSCOPY DIAGNOSTIC (GENERAL) N/A 05/04/2025    Procedure: LAPAROSCOPY, DIAGNOSTIC with lysis of adhesions, converted to;  Surgeon: Jacobo Cintron DO;  [facility]    MASTECTOMY MODIFIED RADICAL Bilateral     bilateral; right breast prophylactic    OPTICAL TRACKING SYSTEM FUSION POSTERIOR SPINE LUMBAR N/A 11/18/2021    Procedure: Open Posterior Instrumented Spinal Fusion at Lumbar 5 to Sacral 1, with grimm aguayo osteotomy, use of O-Arm/Stealth;  Surgeon: Serge Ramirez MD;  [facility]    OTOPLASTY Bilateral 07/25/2024    Procedure: Right Revision Functional Otoplasty;  Surgeon: Juan Agudelo MD;  Location: [facility] OR    PARATHYROIDECTOMY  09/23/2004    R SUPERIOR    PARATHYROIDECTOMY  09/23/2004    parathyroid resection, subtotal    PICC INSERTION - DOUBLE LUMEN Right 05/08/2025    38-1cm, Basilic vein    RELEASE CARPAL TUNNEL Right 12/02/2021    Procedure: RIGHT CARPAL TUNNEL RELEASE, RIGHT WRIST HARDWARE REMOVAL, RIGHT CARPAL BOSS EXCISION;  Surgeon: Rolan Conley MD;  Location: [facility] OR    REMOVE HARDWARE HAND  09/24/2013    Procedure: REMOVE HARDWARE HAND;  Left Hand Screw Removal       RESECT SMALL BOWEL WITHOUT OSTOMY N/A 05/04/2025    Procedure: open laparotomy with small bowel resection x1;  Surgeon: Jacobo Cintron DO;  Location: [facility] OR    RHINOPLASTY  1968    thyr proc skin closed cosmetic manner by subcuticular stitch  01/23/2009    THYROPLASTY  10/09/2009    TONSILLECTOMY  1977    VITRECTOMY PARSPLANA WITH 25 GAUGE SYSTEM Left 06/20/2022    Procedure: LEFT EYE VITRECTOMY, PARS PLANA APPROACH,  USING 25-GAUGE INSTRUMENTS, laser;  Surgeon: Felix Carlos MD;  Location: [facility] OR    WRIST SURGERY      wrist arthrodesis       MEDICATIONS AND ALLERGIES  Allergies   Allergen Reactions    Erythromycin Nausea    Penicillins Hives     Around age 6 - doesn't recall full reaction, mother told her it was hives.     Has tolerated cephalsporins.        Current Outpatient Medications:     acetaminophen (TYLENOL) 500 MG tablet, Take 1,000 mg by mouth 3 times daily, Disp: , Rfl:     artificial tears OINT ophthalmic ointment, Place into both eyes nightly as needed for dry eyes., Disp: , Rfl:     atorvastatin (LIPITOR) 80 MG tablet, Take 1 tablet (80 mg) by mouth daily., Disp: 90 tablet, Rfl: 3    carvedilol (COREG) 12.5 MG tablet, Take 1 tablet (12.5 mg) by mouth 2 times daily (with meals)., Disp: 180 tablet, Rfl: 3    CRANBERRY EXTRACT PO, Take 500 mg by mouth every morning, Disp: , Rfl:     diclofenac (VOLTAREN) 1 % topical gel, Apply 2 g topically 4 times daily, Disp: 100 g, Rfl: 6    gabapentin (NEURONTIN) 300 MG capsule, Take 4 capsules (1,200 mg) by mouth 3 times daily., Disp: 1080 capsule, Rfl: 3    HEMP OIL OR EXTRACT OR OTHER CBD CANNABINOID, NOT MEDICAL CANNABIS,, THC, Disp: , Rfl:     hydrochlorothiazide (HYDRODIURIL) 25 MG tablet, Take 1 tablet (25 mg) by mouth daily., Disp: 90 tablet, Rfl: 3    levothyroxine (SYNTHROID/LEVOTHROID) 112 MCG tablet, TAKE 1/2 TAB BY MOUTH EVERY DAY, Disp: 45 tablet, Rfl: 3    lisinopril (ZESTRIL) 20 MG tablet, Take 1 tablet (20 mg) by mouth 2 times daily., Disp: 180 tablet, Rfl: 3    MAGNESIUM GLYCINATE PO, Take 800 mg by mouth at bedtime., Disp: , Rfl:     Melatonin 10 MG TABS tablet, Take 10 mg by mouth nightly as needed., Disp: , Rfl:     methocarbamol (ROBAXIN) 500 MG tablet, Take 1 tablet (500 mg) by mouth every 8 hours as needed for muscle spasms., Disp: 30 tablet, Rfl: 0    methocarbamol (ROBAXIN) 500 MG tablet, Take 1 tablet (500 mg) by mouth 4 times daily as  needed for muscle spasms., Disp: 30 tablet, Rfl: 0    Multiple Vitamin (MULTI-VITAMIN) per tablet, Take 1 tablet by mouth every morning, Disp: , Rfl:     omeprazole (PRILOSEC) 20 MG DR capsule, Take 1 capsule (20 mg) by mouth daily., Disp: 90 capsule, Rfl: 3    ondansetron (ZOFRAN ODT) 4 MG ODT tab, Take 1 tablet (4 mg) by mouth every 8 hours as needed for nausea., Disp: 90 tablet, Rfl: 0    ondansetron (ZOFRAN) 4 MG tablet, Take 1 tablet (4 mg) by mouth every 6 hours as needed for nausea., Disp: 12 tablet, Rfl: 3    ondansetron (ZOFRAN-ODT) 4 MG ODT tab, Take 1 tablet (4 mg) by mouth every 12 hours as needed for nausea, Disp: 180 tablet, Rfl: 3    oxyCODONE (ROXICODONE) 5 MG tablet, Take 1 tablet (5 mg) by mouth 3 times daily as needed for severe pain., Disp: 40 tablet, Rfl: 0    spironolactone (ALDACTONE) 25 MG tablet, Take 1 tablet (25 mg) by mouth daily at 2 pm. (Patient taking differently: Take 50 mg by mouth daily at 2 pm.), Disp: 90 tablet, Rfl: 3    tiZANidine (ZANAFLEX) 4 MG tablet, Take 1 tablet (4 mg) by mouth at bedtime., Disp: 90 tablet, Rfl: 3    Turmeric 500 MG CAPS, Take 500 mg by mouth every morning, Disp: , Rfl:     SOCIAL HISTORY  Social History     Socioeconomic History    Marital status:      Spouse name: Not on file    Number of children: Not on file    Years of education: Not on file    Highest education level: Not on file   Occupational History    Not on file   Tobacco Use    Smoking status: Never     Passive exposure: Never    Smokeless tobacco: Never   Vaping Use    Vaping status: Never Used   Substance and Sexual Activity    Alcohol use: Yes     Alcohol/week: 7.0 standard drinks of alcohol     Types: 7 Glasses of wine per week     Comment: Rare if ever    Drug use: Yes     Types: Marijuana     Comment: Smoke through water filter for chronic back pain PRN.    Sexual activity: Not Currently     Partners: Male     Birth control/protection: Post-menopausal, Female Surgical, None    Other Topics Concern     Service No    Blood Transfusions Not Asked    Caffeine Concern No    Occupational Exposure No    Hobby Hazards No    Sleep Concern No    Stress Concern No    Weight Concern No    Special Diet No    Back Care No    Exercise Yes     Comment: walks 4-6x  week for 20-30 min. each    Bike Helmet Not Asked    Seat Belt Yes    Self-Exams Yes    Parent/sibling w/ CABG, MI or angioplasty before 65F 55M? Yes     Comment: Father   Social History Narrative    .  Recently retired. 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        She lives with a renter.         She exercises 50 minutes three times a week.     Social Drivers of Health     Financial Resource Strain: Low Risk  (5/7/2025)    Financial Resource Strain     Within the past 12 months, have you or your family members you live with been unable to get utilities (heat, electricity) when it was really needed?: No   Food Insecurity: Low Risk  (5/7/2025)    Food Insecurity     Within the past 12 months, did you worry that your food would run out before you got money to buy more?: No     Within the past 12 months, did the food you bought just not last and you didn t have money to get more?: No   Transportation Needs: Low Risk  (5/7/2025)    Transportation Needs     Within the past 12 months, has lack of transportation kept you from medical appointments, getting your medicines, non-medical meetings or appointments, work, or from getting things that you need?: No   Physical Activity: Not on file   Stress: Not on file   Social Connections: Not on file   Interpersonal Safety: Low Risk  (5/4/2025)    Interpersonal Safety     Do you feel physically and emotionally safe where you currently live?: Yes     Within the past 12 months, have you been hit, slapped, kicked or otherwise physically hurt by someone?: No     Within the past 12 months, have you been humiliated or  emotionally abused in other ways by your partner or ex-partner?: No   Housing Stability: Low Risk  (5/7/2025)    Housing Stability     Do you have housing? : Yes     Are you worried about losing your housing?: No        FAMILY HISTORY  Family History   Problem Relation Age of Onset    Neurologic Disorder Mother         Anuerysm of Cerebral Artery, Dementia    Diabetes Mother     Thyroid Disease Mother         Hyperthyroidism (goiter)    Cerebrovascular Disease Mother         Hemorrhagic    Dementia Mother     Osteoporosis Mother     Hyperlipidemia Mother     Heart Disease Father         AAA    Hypertension Father     Coronary Artery Disease Father     Hyperlipidemia Father     Mental Illness Father         Schizophrenia?    Dementia Brother         hydrocephalis    Circulatory Brother         Perihperal Neurophathy    Peripheral Neuropathy Brother     Peripheral Neuropathy Brother     Diabetes Maternal Grandmother         Presumed Type 2    Asthma Maternal Grandmother     Chronic Obstructive Pulmonary Disease Maternal Grandfather         father    Asthma Maternal Grandfather     Diabetes Maternal Aunt         x2    Melanoma Maternal Aunt     Glaucoma Maternal Aunt     Breast Cancer Cousin     Breast Cancer Cousin     Dementia Other     Cancer Other         malignant melanoma    Hypertension Other     Hypertension Other     Cerebrovascular Disease Other     Cerebrovascular Disease Other     Obesity Other     Respiratory Other     Cancer Other     Diabetes Other     Asthma Other     Other Cancer Other         Malignant melanoma    Obesity Other     Macular Degeneration No family hx of     Kidney Disease No family hx of     Thrombosis No family hx of     Arthritis No family hx of     Depression No family hx of     Substance Abuse No family hx of     Cystic Fibrosis No family hx of     Early Death No family hx of     Coronary Artery Disease Early Onset No family hx of     Heart Failure No family hx of     Bleeding  "Diathesis No family hx of     Ovarian Cancer No family hx of     Uterine Cancer No family hx of     Prostate Cancer No family hx of     Colorectal Cancer No family hx of     Pancreatic Cancer No family hx of     Lung Cancer No family hx of     Autoimmune Disease No family hx of     Unknown/Adopted No family hx of     Genetic Disorder No family hx of     Bleeding Disorder No family hx of     Clotting Disorder No family hx of     Anesthesia Reaction No family hx of        EXAMINATION     Vitals: /77 (BP Location: Right arm, Patient Position: Sitting, Cuff Size: Adult Regular)   Pulse 75   Temp 98.7  F (37.1  C)   Ht 1.651 m (5' 5\")   Wt 73.5 kg (162 lb)   LMP  (LMP Unknown)   SpO2 97%   BMI 26.96 kg/m    BMI: Body mass index is 26.96 kg/m .    GENERAL/PSYCH: Patient is awake, A&Ox3, NAD, stable mood, good judgement and insight.  HEAD: Atraumatic, Normocephalic  EYES: Anicteric. Pupils equal and reactive  NECK: No masses/LNs. Trachea midline.  CHEST: Symmetrical, Respiratory effort WNL, no stridor.  HEART: Regular Rate and Rhythm.   ABDOMEN: Soft, non distended with minimal tenderness  INCISION: healing well, no drainage with minimal pain  LOWER EXTREMITIES: No gross deformity. Pulses palpable and equal bilaterally.  SKIN: No visible generalized rash.       ASSESSMENT & PLAN   Obstruction concurrent with and due to hernia of abdominal wall  S/p diagnostic laparoscopy converted to Open laparotomy with small bowel resection and primary repair of a ventral hernia.     Tolerating diet and return of normal bowel function.  Abdominal pain resolved but continues to have exacerbated chronic back pain.    Will renew robaxin script today while she waits for her appointment with pain management.   Discussion regarding risk of biliary stasis and jaundice with prolonged robaxin use. Patient states understanding.     Discussed pathology report.  Follow up as needed.  Follow up with PCP.  Author: Jacobo SOLIZ" DO Abdulaziz 5/19/2025 1:25 PM  Patient's Primary Care Provider: Matilde Quiñonez

## 2025-05-19 NOTE — NURSING NOTE
"Chief Complaint   Patient presents with    Post-Op - General Surgery     PO       Vitals:    05/19/25 1230   BP: 120/77   BP Location: Right arm   Patient Position: Sitting   Cuff Size: Adult Regular   Pulse: 75   Temp: 98.7  F (37.1  C)   SpO2: 97%   Weight: 73.5 kg (162 lb)   Height: 1.651 m (5' 5\")       Body mass index is 26.96 kg/m .                          Quang Cancino, EMT    "

## 2025-05-22 ENCOUNTER — TELEPHONE (OUTPATIENT)
Dept: INTERNAL MEDICINE | Facility: CLINIC | Age: 75
End: 2025-05-22
Payer: COMMERCIAL

## 2025-05-22 NOTE — TELEPHONE ENCOUNTER
Left detailed VM on confidential voicemail box for Nahomy with [facility] Spark with the following verbal order(s): Home Care Orders: Occupational Therapy (OT): 1 time a week for 2 weeks/ 2 times a week for 1 week.  Anuradha FLORES LPN  [facility]

## 2025-05-22 NOTE — TELEPHONE ENCOUNTER
[facility] Call Center    Phone Message    May a detailed message be left on voicemail: yes     Reason for Call: Order(s): Home Care Orders: Occupational Therapy (OT): 1 time a week for 2 weeks/ 2 times a week for 1 week.    Action Taken: Message routed to:  [facility] pcc    Travel Screening: Not Applicable     Date of Service:

## 2025-05-27 ENCOUNTER — TELEPHONE (OUTPATIENT)
Dept: ANESTHESIOLOGY | Facility: CLINIC | Age: 75
End: 2025-05-27
Payer: COMMERCIAL

## 2025-05-27 ENCOUNTER — OFFICE VISIT (OUTPATIENT)
Dept: ANESTHESIOLOGY | Facility: CLINIC | Age: 75
End: 2025-05-27
Payer: COMMERCIAL

## 2025-05-27 VITALS
SYSTOLIC BLOOD PRESSURE: 106 MMHG | DIASTOLIC BLOOD PRESSURE: 67 MMHG | HEART RATE: 82 BPM | RESPIRATION RATE: 16 BRPM | OXYGEN SATURATION: 98 %

## 2025-05-27 DIAGNOSIS — E11.9 DIABETES MELLITUS, TYPE 2 (H): ICD-10-CM

## 2025-05-27 DIAGNOSIS — M54.17 LUMBOSACRAL RADICULOPATHY: ICD-10-CM

## 2025-05-27 DIAGNOSIS — F11.90 CHRONIC, CONTINUOUS USE OF OPIOIDS: ICD-10-CM

## 2025-05-27 DIAGNOSIS — M53.3 PAIN OF BOTH SACROILIAC JOINTS: ICD-10-CM

## 2025-05-27 DIAGNOSIS — Z51.81 ENCOUNTER FOR MONITORING OPIOID MAINTENANCE THERAPY: Primary | ICD-10-CM

## 2025-05-27 DIAGNOSIS — Z90.49 S/P SMALL BOWEL RESECTION: ICD-10-CM

## 2025-05-27 DIAGNOSIS — Z79.891 ENCOUNTER FOR MONITORING OPIOID MAINTENANCE THERAPY: Primary | ICD-10-CM

## 2025-05-27 DIAGNOSIS — G62.9 PERIPHERAL POLYNEUROPATHY: ICD-10-CM

## 2025-05-27 DIAGNOSIS — Z79.899 CONTROLLED SUBSTANCE AGREEMENT SIGNED: ICD-10-CM

## 2025-05-27 DIAGNOSIS — Z01.818 PRE-OP EXAM: ICD-10-CM

## 2025-05-27 LAB — CREAT UR-MCNC: 91 MG/DL

## 2025-05-27 PROCEDURE — 99000 SPECIMEN HANDLING OFFICE-LAB: CPT | Performed by: PATHOLOGY

## 2025-05-27 PROCEDURE — 3078F DIAST BP <80 MM HG: CPT

## 2025-05-27 PROCEDURE — 80354 DRUG SCREENING FENTANYL: CPT

## 2025-05-27 PROCEDURE — 99214 OFFICE O/P EST MOD 30 MIN: CPT

## 2025-05-27 PROCEDURE — 3074F SYST BP LT 130 MM HG: CPT

## 2025-05-27 PROCEDURE — 82043 UR ALBUMIN QUANTITATIVE: CPT | Performed by: NURSE PRACTITIONER

## 2025-05-27 RX ORDER — OXYCODONE HYDROCHLORIDE 5 MG/1
5 TABLET ORAL EVERY 6 HOURS PRN
Qty: 90 TABLET | Refills: 0 | Status: SHIPPED | OUTPATIENT
Start: 2025-05-27

## 2025-05-27 ASSESSMENT — PAIN SCALES - PAIN ENJOYMENT GENERAL ACTIVITY SCALE (PEG)
AVG_PAIN_PASTWEEK: 5
PEG_TOTALSCORE: 6.67
INTERFERED_GENERAL_ACTIVITY: 8
INTERFERED_ENJOYMENT_LIFE: 7

## 2025-05-27 NOTE — NURSING NOTE
Chief Complaint   Patient presents with    RECHECK     Here for follow up, confirmed with patient     Anabella Lewis

## 2025-05-27 NOTE — TELEPHONE ENCOUNTER
Left Voicemail (1st Attempt) and Sent Mychart (1st Attempt) for waitlisted patient to offer the following appointment:    Provider: Renée Harris   Appointment Type: Return pain  Date: 5/27/25  Time: 3:00 PM or 4:00 PM     Please note there is no guarantee this appointment will be available as it is first come first serve.

## 2025-05-27 NOTE — PATIENT INSTRUCTIONS
Medications:    oxyCODONE (ROXICODONE) 5 MG tablets ordered. Take 1 tablet (5 mg) by mouth every 6 hours as needed for severe pain. #90 tabs to last 30 days. May fill 5/27.       Naloxone nasal spray ordered.      *For refills of opioid medications - You MUST call (Or MyChart) the clinic DIRECTLY and at least 7 days before you run out of your medication.        Treatment planning:    Controlled Substance Agreement signed and Urine Drug Screen ordered.       Recommended Follow up:      Follow up in July.        Please call [phone] to schedule your clinic appointment if you don't already have an appointment scheduled.        To speak with a nurse, schedule/reschedule/cancel a clinic appointment, or request a medication refill call: [phone]    You can also reach us by ZAP: [email or web address]

## 2025-05-27 NOTE — PROGRESS NOTES
Patient confirmed rescheduled appointment:     Date: 5/27/25  Time: 3:00 PM  Visit type: Return Pain  Visit mode: In Person  Provider: Renée Harris  Location: [facility]    Additional Notes: Rescheduled from waitlist

## 2025-05-27 NOTE — LETTER
Opioid / Opioid Plus Controlled Substance Agreement    This is an agreement between you and your provider about the safe and appropriate use of controlled substance/opioids prescribed by your care team. Controlled substances are medicines that can cause physical and mental dependence (abuse).    There are strict laws about having and using these medicines. We here at [facility] are committing to working with you in your efforts to get better. To support you in this work, we ll help you schedule regular office appointments for medicine refills. If we must cancel or change your appointment for any reason, we ll make sure you have enough medicine to last until your next appointment.     As a Provider, I will:  Listen carefully to your concerns and treat you with respect.   Recommend a treatment plan that I believe is in your best interest. This plan may involve therapies other than opioid pain medication.   Talk with you often about the possible benefits, and the risk of harm of any medicine that we prescribe for you.   Provide a plan on how to taper (discontinue or go off) using this medicine if the decision is made to stop its use.    As a Patient, I understand that opioid(s):   Are a controlled substance prescribed by my care team to help me function or work and manage my condition(s).   Are strong medicines and can cause serious side effects such as:  Drowsiness, which can seriously affect my driving ability  A lower breathing rate, enough to cause death  Harm to my thinking ability   Depression   Abuse of and addiction to this medicine  Need to be taken exactly as prescribed. Combining opioids with certain medicines or chemicals (such as illegal drugs, sedatives, sleeping pills, and benzodiazepines) can be dangerous or even fatal. If I stop opioids suddenly, I may have severe withdrawal symptoms.  Do not work for all types of pain nor for all patients. If they re not helpful, I may be asked to stop  them.      The risks, benefits and side effects of these medicine(s) were explained to me. I agree that:  I will take part in other treatments as advised by my care team. This may be psychiatry or counseling, physical therapy, behavioral therapy, group treatment or a referral to a specialist.     I will keep all my appointments. I understand that this is part of the monitoring of opioids. My care team may require an office visit for EVERY opioid/controlled substance refill. If I miss appointments or don t follow instructions, my care team may stop my medicine.    I will take my medicines as prescribed. I will not change the dose or schedule unless my care team tells me to. There will be no refills if I run out early.     I may be asked to come to the clinic and complete a urine drug test or complete a pill count at any time. If I don t give a urine sample or participate in a pill count, the care team may stop my medicine.    I will only receive prescriptions from this clinic for chronic pain. If I am treated by another provider for acute pain issues, I will tell them that I am taking opioid pain medication for chronic pain and that I have a treatment agreement with this provider. I will inform my [facility] care team within one business day if I am given a prescription for any pain medication by another healthcare provider. My [facility] care team can contact other providers and pharmacists about my use of any medicines.    It is up to me to make sure that I don t run out of my medicines on weekends or holidays. If my care team is willing to refill my opioid prescription without a visit, I must request refills only during office hours. Refills may take up to 7 business days to process. I will use one pharmacy to fill all my opioid and other controlled substance prescriptions. I will notify the clinic about any changes to my insurance or medication availability.    I am responsible for my prescriptions.  If the medicine/prescription is lost, stolen or destroyed, it will not be replaced. I also agree not to share controlled substance medicines with anyone.    I am aware I should not use any illegal or recreational drugs. I agree not to drink alcohol unless my care team says I can.       If I enroll in the [place] Medical Cannabis program, I will tell my care team prior to my next refill.     I will tell my care team right away if I become pregnant, have a new medical problem treated outside of my regular clinic, or have a change in my medications.    I understand that this medicine can affect my thinking, judgment and reaction time. Alcohol and drugs affect the brain and body, which can affect the safety of my driving. Being under the influence of alcohol or drugs can affect my decision-making, behaviors, personal safety, and the safety of others. Driving while impaired (DWI) can occur if a person is driving, operating, or in physical control of a car, motorcycle, boat, snowmobile, ATV, motorbike, off-road vehicle, or any other motor vehicle ([place] Statute 169A.20). I understand the risk if I choose to drive or operate any vehicle or machinery.    I understand that if I do not follow any of the conditions above, my prescriptions or treatment may be stopped or changed.          Opioids  What You Need to Know    What are opioids?   Opioids are pain medicines that must be prescribed by a doctor. They are also known as narcotics.     Examples are:   morphine (MS Contin, Yadi)  oxycodone (Oxycontin)  oxycodone and acetaminophen (Percocet)  hydrocodone and acetaminophen (Vicodin, Norco)   fentanyl patch (Duragesic)   hydromorphone (Dilaudid)   methadone  codeine (Tylenol #3)     What do opioids do well?   Opioids are best for severe short-term pain such as after a surgery or injury. They may work well for cancer pain. They may help some people with long-lasting (chronic) pain.     What do opioids NOT do well?   Opioids  never get rid of pain entirely, and they don t work well for most patients with chronic pain. Opioids don t reduce swelling, one of the causes of pain.                                    Other ways to manage chronic pain and improve function include:     Treat the health problem that may be causing pain  Anti-inflammation medicines, which reduce swelling and tenderness, such as ibuprofen (Advil, Motrin) or naproxen (Aleve)  Acetaminophen (Tylenol)  Antidepressants and anti-seizure medicines, especially for nerve pain  Topical treatments such as patches or creams  Injections or nerve blocks  Chiropractic or osteopathic treatment  Acupuncture, massage, deep breathing, meditation, visual imagery, aromatherapy  Use heat or ice at the pain site  Physical therapy   Exercise  Stop smoking  Take part in therapy       Risks and side effects     Talk to your doctor before you start or decide to keep taking opioids. Possible side effects include:    Lowering your breathing rate enough to cause death  Overdose, including death, especially if taking higher than prescribed doses  Worse depression symptoms; less pleasure in things you usually enjoy  Feeling tired or sluggish  Slower thoughts or cloudy thinking  Being more sensitive to pain over time; pain is harder to control  Trouble sleeping or restless sleep  Changes in hormone levels (for example, less testosterone)  Changes in sex drive or ability to have sex  Constipation  Unsafe driving  Itching and sweating  Dizziness  Nausea, throwing up and dry mouth    What else should I know about opioids?    Opioids may lead to dependence, tolerance, or addiction.    Dependence means that if you stop or reduce the medicine too quickly, you will have withdrawal symptoms. These include loose poop (diarrhea), jitters, flu-like symptoms, nervousness and tremors. Dependence is not the same as addiction.                     Tolerance means needing higher doses over time to get the same  effect. This may increase the chance of serious side effects.    Addiction is when people improperly use a substance that harms their body, their mind or their relations with others. Use of opiates can cause a relapse of addiction if you have a history of drug or alcohol abuse.    People who have used opioids for a long time may have a lower quality of life, worse depression, higher levels of pain and more visits to doctors.    You can overdose on opioids. Take these steps to lower your risk of overdose:    Recognize the signs:  Signs of overdose include decrease or loss of consciousness (blackout), slowed breathing, trouble waking up and blue lips. If someone is worried about overdose, they should call 911.    Talk to your doctor about Narcan (naloxone).   If you are at risk for overdose, you may be given a prescription for Narcan. This medicine very quickly reverses the effects of opioids.   If you overdose, a friend or family member can give you Narcan while waiting for the ambulance. They need to know the signs of overdose and how to give Narcan.     Don't use alcohol or street drugs.   Taking them with opioids can cause death.    Do not take any of these medicines unless your doctor says it s OK. Taking these with opioids can cause death:  Benzodiazepines, such as lorazepam (Ativan), alprazolam (Xanax) or diazepam (Valium)  Muscle relaxers, such as cyclobenzaprine (Flexeril)  Sleeping pills like zolpidem (Ambien)   Other opioids      How to keep you and other people safe while taking opioids:    Never share your opioids with others.  Opioid medicines are regulated by the Drug Enforcement Agency (FEMI). Selling or sharing medications is a criminal act.    2. Be sure to store opioids in a secure place, locked up if possible. Young children can easily swallow them and overdose.    3. When you are traveling with your medicines, keep them in the original bottles. If you use a pill box, be sure you also carry a copy  of your medicine list from your clinic or pharmacy.    4. Safe disposal of opioids    Most pharmacies have places to get rid of medicine, called disposal kiosks. Medicine disposal options are also available in every [facility]. Search your county and  medication disposal  to find more options. You can find more details at:  [email or web address]     I agree that my provider, clinic care team, and pharmacy may work with any city, state or federal law enforcement agency that investigates the misuse, sale, or other diversion of my controlled medicine. I will allow my provider to discuss my care with, or share a copy of, this agreement with any other treating provider, pharmacy or emergency room where I receive care.    I have read this agreement and have asked questions about anything I did not understand.    _______________________________________________________  Patient Signature - Nadira Perez _____________________                   Date     _______________________________________________________  Provider Signature - MERCEDES Pace CNP   _____________________                   Date     _______________________________________________________  Witness Signature (required if provider not present while patient signing)   _____________________                   Date

## 2025-05-27 NOTE — Clinical Note
5/27/2025       RE: Nadira Perez  [id number]  [facility] [id number]     Dear Colleague,    Thank you for referring your patient, Nadira Perez, to the [facility] [place] at [facility]. Please see a copy of my visit note below.    [facility]     Date of visit: 5/27/2025      Assessment:   Nadira Perez is a 74 year old year old female  who presents to the pain clinic in follow up for:     Chronic low back pain - Benefit with caudal GREGOR. Etiology likely associated with multiple factors, including underlying degenerative changes, radicular component present, overlying myofascial component likely contributes.     Of note, Prior discussion with Dr. Umaña -  she is receiving steroid injections in the wrist, need to keep a count of annual steroid injections received.     ***Today, ***        Assigned to [facility] team.    Visit Diagnoses:  No diagnosis found.    Plan:  Diagnosis reviewed, treatment option addressed, and risk/benifits discussed.  Self-care instructions given.  I am recommending a multidisciplinary treatment plan to help this patient better manage their pain.    ***  Physical Therapy:  YES     Pain PT with Otto Jauregui previously.   New referral for PT placed today, per patient request.   RLE lower leg atrophy, potentially from use of brace and chronic low back pain.     Pain Psychology: No     Diagnostic Studies:    No new orders today.      Medication Management:   Gabapentin 1200 mg TID and tizanidine 4 mg at bedtime PRN. PCP managing.   Topicals - Diclofenac gel and lidocaine cream   Medical cannabis/CBD - appreciates benefit, recent use of OTC products.      Potential procedures:   Caudal GREGOR - last completed on 1/2/24. Appreciates benefit. May repeat every 3+ months. Advised to contact clinic if interested in repeat injection.   Bilateral SI joint injections  completed on 10/2/24 with Dr. Saldivar. Reports significant benefit for 5 weeks, then pain increased to an extent but still less than before injection. Will continue to monitor benefit and may consider repeat in future if appropriate.      Other Orders/Referrals:   None      Follow up with MERCEDES Pace CNP in 4-6 months or sooner if needed     Additional visit details: ***medical cannabis use for UDS;     Review of Electronic Chart: Today I have also reviewed available medical information in the patient's medical record at [facility] ([facility]) and Care Everywhere (if available), including relevant provider notes, laboratory work, and imaging.     Renée Harris, RAJAN, MERCEDES, AGNP-C  [facility]     -------------------------------------------------    Subjective:    Chief complaint: No chief complaint on file.      Interval history:  Nadira Perez is a 74 year old female last seen on 12/10/24.      Recommendations/plan at the last visit included:  Physical Therapy:  YES     Pain PT with Otto Jauregui previously.   New referral for PT placed today, per patient request.   RLE lower leg atrophy, potentially from use of brace and chronic low back pain.     Pain Psychology: No     Diagnostic Studies:    No new orders today.      Medication Management:   Gabapentin 1200 mg TID and tizanidine 4 mg at bedtime PRN. PCP managing.   Topicals - Diclofenac gel and lidocaine cream   Medical cannabis/CBD - appreciates benefit, recent use of OTC products.      Potential procedures:   Caudal GREGOR - last completed on 1/2/24. Appreciates benefit. May repeat every 3+ months. Advised to contact clinic if interested in repeat injection.   Bilateral SI joint injections completed on 10/2/24 with Dr. Saldivar. Reports significant benefit for 5 weeks, then pain increased to an extent but still less than before injection. Will continue to monitor benefit and may consider repeat in future if appropriate.      Other  Orders/Referrals:   None      Follow up with MERCEDES Pace CNP in 4-6 months or sooner if needed     Since her last visit, Nadira L Chris reports:  -She had a fall since last visit, fell forward out of her chair.   -She had CT of lumbar and SI joints done. 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   -Pain is at a 5/10 constantly. Overhead reaching increases pain 7-8/10.  -She has appt with Dr. Saldivar tomorrow to consider other injections.   -She is getting foot drop in left leg now too.   -Sports med was prescribing oxycodone for her. She is using up to 2-3 tabs/day.   -She needs help with oxycodone management until she can get a better idea of tx course.       PEG: A Three-Item Scale Assessing Pain Intensity and Interference    What number best describes your PAIN ON AVERAGE in the past week? (Patient-Rptd) 5    What number best describes how, during the past week, pain has interfered with your ENJOYMENT OF LIFE? (Patient-Rptd) 7    What number best describes how, during the past week, pain has interfered with your GENERAL ACTIVITY? (Patient-Rptd) 8    PEG Total Score: (Patient-Rptd) 6.67    Marin EE, Rocky KA, Maxwell MJ, Eden TA, Jarvis J, Ken JM, Pooja SM, Jewel K. Development and initial validation of the PEG, a 3-item scale assessing pain intensity and interference. Journal of General Internal Medicine. 2009 Charlie;24:733-738.        HPI/Interval Hx from last visit:  -Her brother recently . He had hydrocephalus related to concussions in the past. He had dementia. She went to [facility] for his  the day after . She was able to get through airport okay with wheelchair.   -She is using cane and walker to help with balance.   -She continues lymphedema PT.  -She does piliates regularly, reports benefit for low back pain.   -She had SI joint injections on 10/2 with Dr. Saldivar. She reports significant benefit for 5 weeks, but then pain has started to gradually return.   -She  continues to appreciate benefit with medications: gabapentin, tizanidine, and medical cannabis.   -She requests a new PT referral to continue working on RLE and also strength/conditioning.       Current Pain Treatments:    ***  Medications:                             Tizanidine 4 mg at bedtime  (PCP)              Gabapentin 1200 mg TID (PCP manages)              OTC cannabis/CBD     2. Other therapies:               Pain PT HEP              Piliates/HEP              Heat/Ice              Caudal GREGOR, SI joint injections         Current MME: N/A     Review of [place] Prescription Monitoring Program (): YES ***        Past pain treatments:  Hydrocodone 5-325 mg PRN (PCP managed), stopped due to concerns for stigma/judgement by health care providers. Could consider resuming occasional use.  ***    Medications:  Current Outpatient Medications   Medication Sig Dispense Refill    acetaminophen (TYLENOL) 500 MG tablet Take 1,000 mg by mouth 3 times daily      artificial tears OINT ophthalmic ointment Place into both eyes nightly as needed for dry eyes.      atorvastatin (LIPITOR) 80 MG tablet Take 1 tablet (80 mg) by mouth daily. 90 tablet 3    carvedilol (COREG) 12.5 MG tablet Take 1 tablet (12.5 mg) by mouth 2 times daily (with meals). 180 tablet 3    CRANBERRY EXTRACT PO Take 500 mg by mouth every morning      diclofenac (VOLTAREN) 1 % topical gel Apply 2 g topically 4 times daily 100 g 6    gabapentin (NEURONTIN) 300 MG capsule Take 4 capsules (1,200 mg) by mouth 3 times daily. 1080 capsule 3    HEMP OIL OR EXTRACT OR OTHER CBD CANNABINOID, NOT MEDICAL CANNABIS, THC      hydrochlorothiazide (HYDRODIURIL) 25 MG tablet Take 1 tablet (25 mg) by mouth daily. 90 tablet 3    levothyroxine (SYNTHROID/LEVOTHROID) 112 MCG tablet TAKE 1/2 TAB BY MOUTH EVERY DAY 45 tablet 3    lisinopril (ZESTRIL) 20 MG tablet Take 1 tablet (20 mg) by mouth 2 times daily. 180 tablet 3    MAGNESIUM GLYCINATE PO Take 800 mg by mouth at  "bedtime.      Melatonin 10 MG TABS tablet Take 10 mg by mouth nightly as needed.      methocarbamol (ROBAXIN) 500 MG tablet Take 1 tablet (500 mg) by mouth every 8 hours as needed for muscle spasms. 30 tablet 0    methocarbamol (ROBAXIN) 500 MG tablet Take 1 tablet (500 mg) by mouth 4 times daily as needed for muscle spasms. 30 tablet 0    Multiple Vitamin (MULTI-VITAMIN) per tablet Take 1 tablet by mouth every morning      omeprazole (PRILOSEC) 20 MG DR capsule Take 1 capsule (20 mg) by mouth daily. 90 capsule 3    ondansetron (ZOFRAN ODT) 4 MG ODT tab Take 1 tablet (4 mg) by mouth every 8 hours as needed for nausea. 90 tablet 0    ondansetron (ZOFRAN) 4 MG tablet Take 1 tablet (4 mg) by mouth every 6 hours as needed for nausea. 12 tablet 3    ondansetron (ZOFRAN-ODT) 4 MG ODT tab Take 1 tablet (4 mg) by mouth every 12 hours as needed for nausea 180 tablet 3    oxyCODONE (ROXICODONE) 5 MG tablet Take 1 tablet (5 mg) by mouth 3 times daily as needed for severe pain. 40 tablet 0    spironolactone (ALDACTONE) 25 MG tablet Take 1 tablet (25 mg) by mouth daily at 2 pm. (Patient taking differently: Take 50 mg by mouth daily at 2 pm.) 90 tablet 3    tiZANidine (ZANAFLEX) 4 MG tablet Take 1 tablet (4 mg) by mouth at bedtime. 90 tablet 3    Turmeric 500 MG CAPS Take 500 mg by mouth every morning         Medical History: any changes in medical history since they were last seen? { :898003::\"No\"}      Objective:    Physical Exam:  not currently breastfeeding.  Constitutional: Well developed, well nourished, appears stated age.  Gait: {GAIT:[id number]}  HEENT: Head atraumatic, normocephalic. Eyes without conjunctival injection or jaundice. Oropharynx clear. Neck supple. No obvious neck masses.  Skin: No rash, lesions, or petechiae of exposed skin.   Psychiatric/mental status: Alert, without lethargy or stupor. Speech fluent. Appropriate affect. Mood normal. Able to follow commands without difficulty.     Diagnostic " Tests/Imaging/Labs:  ***    BILLING TIME DOCUMENTATION:   The total TIME spent on this patient on the date of the encounter/appointment was *** minutes.      TOTAL TIME includes:   Time spent preparing to see the patient (reviewing records and tests)   Time spent face to face (or over the phone) with the patient   Time spent ordering tests, medications, procedures and referrals   Time spent Referring and communicating with other healthcare professionals   Time spent documenting clinical information in Epic               Again, thank you for allowing me to participate in the care of your patient.      Sincerely,    MERCEDES Pace CNP

## 2025-05-27 NOTE — PROGRESS NOTES
[facility] Pain Management     Date of visit: 5/27/2025      Assessment:   Nadira Perez is a 74 year old year old female  who presents to the pain clinic in follow up for:     Chronic low back pain - Benefit with caudal GREGOR. Etiology likely associated with multiple factors, including underlying degenerative changes, radicular component present, overlying myofascial component likely contributes.     Of note, Prior discussion with Dr. Umaña -  she is receiving steroid injections in the wrist, need to keep a count of annual steroid injections received.     ***Today, ***        Assigned to [facility] nursing team.    Visit Diagnoses:  No diagnosis found.    Plan:  Diagnosis reviewed, treatment option addressed, and risk/benifits discussed.  Self-care instructions given.  I am recommending a multidisciplinary treatment plan to help this patient better manage their pain.    ***  Physical Therapy:  YES     Pain PT with Otto Jauregui previously.   New referral for PT placed today, per patient request.   RLE lower leg atrophy, potentially from use of brace and chronic low back pain.     Pain Psychology: No     Diagnostic Studies:    No new orders today.      Medication Management:   Gabapentin 1200 mg TID and tizanidine 4 mg at bedtime PRN. PCP managing.   Topicals - Diclofenac gel and lidocaine cream   Medical cannabis/CBD - appreciates benefit, recent use of OTC products.      Potential procedures:   Caudal GREGOR - last completed on 1/2/24. Appreciates benefit. May repeat every 3+ months. Advised to contact clinic if interested in repeat injection.   Bilateral SI joint injections completed on 10/2/24 with Dr. Saldivar. Reports significant benefit for 5 weeks, then pain increased to an extent but still less than before injection. Will continue to monitor benefit and may consider repeat in future if appropriate.      Other Orders/Referrals:   None      Follow up with MERCEDES Pace CNP in 4-6 months or sooner if needed      Additional visit details: ***medical cannabis use for UDS;     Review of Electronic Chart: Today I have also reviewed available medical information in the patient's medical record at [facility]) and Care Everywhere (if available), including relevant provider notes, laboratory work, and imaging.     Renée Harris DNP, APRN, AGNP-C  [facility] Pain Management     -------------------------------------------------    Subjective:    Chief complaint: No chief complaint on file.      Interval history:  Nadira Perez is a 74 year old female last seen on 12/10/24.      Recommendations/plan at the last visit included:  Physical Therapy:  YES     Pain PT with Otto Jauregui previously.   New referral for PT placed today, per patient request.   RLE lower leg atrophy, potentially from use of brace and chronic low back pain.     Pain Psychology: No     Diagnostic Studies:    No new orders today.      Medication Management:   Gabapentin 1200 mg TID and tizanidine 4 mg at bedtime PRN. PCP managing.   Topicals - Diclofenac gel and lidocaine cream   Medical cannabis/CBD - appreciates benefit, recent use of OTC products.      Potential procedures:   Caudal GREGOR - last completed on 1/2/24. Appreciates benefit. May repeat every 3+ months. Advised to contact clinic if interested in repeat injection.   Bilateral SI joint injections completed on 10/2/24 with Dr. Saldivar. Reports significant benefit for 5 weeks, then pain increased to an extent but still less than before injection. Will continue to monitor benefit and may consider repeat in future if appropriate.      Other Orders/Referrals:   None      Follow up with MERCEDES Pace CNP in 4-6 months or sooner if needed     Since her last visit, Nadira Perez reports:  -She had a fall since last visit, fell forward out of her chair.   -She had CT of lumbar and SI joints done. 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   -Pain is at a 5/10 constantly. Overhead  reaching increases pain 7-8/10.  -She has appt with Dr. Saldivar tomorrow to consider other injections.   -She is getting foot drop in left leg now too.   -Sports med was prescribing oxycodone for her. She is using up to 2-3 tabs/day.   -She needs help with oxycodone management until she can get a better idea of tx course.       PEG: A Three-Item Scale Assessing Pain Intensity and Interference    What number best describes your PAIN ON AVERAGE in the past week? (Patient-Rptd) 5    What number best describes how, during the past week, pain has interfered with your ENJOYMENT OF LIFE? (Patient-Rptd) 7    What number best describes how, during the past week, pain has interfered with your GENERAL ACTIVITY? (Patient-Rptd) 8    PEG Total Score: (Patient-Rptd) 6.67    Marin CROWE, Rocky KA, Maxwell MJ, Eden TA, Jarvis J, Ken JM, Pooja SM, Jewel K. Development and initial validation of the PEG, a 3-item scale assessing pain intensity and interference. Journal of General Internal Medicine. 2009 Charlie;24:733-738.        HPI/Interval Hx from last visit:  -Her brother recently . He had hydrocephalus related to concussions in the past. He had dementia. She went to [facility] for his  the day after . She was able to get through airport okay with wheelchair.   -She is using cane and walker to help with balance.   -She continues lymphedema PT.  -She does piliates regularly, reports benefit for low back pain.   -She had SI joint injections on 10/2 with Dr. Saldivar. She reports significant benefit for 5 weeks, but then pain has started to gradually return.   -She continues to appreciate benefit with medications: gabapentin, tizanidine, and medical cannabis.   -She requests a new PT referral to continue working on RLE and also strength/conditioning.       Current Pain Treatments:    ***  Medications:                             Tizanidine 4 mg at bedtime  (PCP)              Gabapentin 1200 mg TID (PCP manages)               OTC cannabis/CBD     2. Other therapies:               Pain PT HEP              Piliates/HEP              Heat/Ice              Caudal GREGOR, SI joint injections         Current MME: N/A     Review of [place] Prescription Monitoring Program (): YES ***        Past pain treatments:  Hydrocodone 5-325 mg PRN (PCP managed), stopped due to concerns for stigma/judgement by health care providers. Could consider resuming occasional use.  ***    Medications:  Current Outpatient Medications   Medication Sig Dispense Refill    acetaminophen (TYLENOL) 500 MG tablet Take 1,000 mg by mouth 3 times daily      artificial tears OINT ophthalmic ointment Place into both eyes nightly as needed for dry eyes.      atorvastatin (LIPITOR) 80 MG tablet Take 1 tablet (80 mg) by mouth daily. 90 tablet 3    carvedilol (COREG) 12.5 MG tablet Take 1 tablet (12.5 mg) by mouth 2 times daily (with meals). 180 tablet 3    CRANBERRY EXTRACT PO Take 500 mg by mouth every morning      diclofenac (VOLTAREN) 1 % topical gel Apply 2 g topically 4 times daily 100 g 6    gabapentin (NEURONTIN) 300 MG capsule Take 4 capsules (1,200 mg) by mouth 3 times daily. 1080 capsule 3    HEMP OIL OR EXTRACT OR OTHER CBD CANNABINOID, NOT MEDICAL CANNABIS, THC      hydrochlorothiazide (HYDRODIURIL) 25 MG tablet Take 1 tablet (25 mg) by mouth daily. 90 tablet 3    levothyroxine (SYNTHROID/LEVOTHROID) 112 MCG tablet TAKE 1/2 TAB BY MOUTH EVERY DAY 45 tablet 3    lisinopril (ZESTRIL) 20 MG tablet Take 1 tablet (20 mg) by mouth 2 times daily. 180 tablet 3    MAGNESIUM GLYCINATE PO Take 800 mg by mouth at bedtime.      Melatonin 10 MG TABS tablet Take 10 mg by mouth nightly as needed.      methocarbamol (ROBAXIN) 500 MG tablet Take 1 tablet (500 mg) by mouth every 8 hours as needed for muscle spasms. 30 tablet 0    methocarbamol (ROBAXIN) 500 MG tablet Take 1 tablet (500 mg) by mouth 4 times daily as needed for muscle spasms. 30 tablet 0    Multiple Vitamin  "(MULTI-VITAMIN) per tablet Take 1 tablet by mouth every morning      omeprazole (PRILOSEC) 20 MG DR capsule Take 1 capsule (20 mg) by mouth daily. 90 capsule 3    ondansetron (ZOFRAN ODT) 4 MG ODT tab Take 1 tablet (4 mg) by mouth every 8 hours as needed for nausea. 90 tablet 0    ondansetron (ZOFRAN) 4 MG tablet Take 1 tablet (4 mg) by mouth every 6 hours as needed for nausea. 12 tablet 3    ondansetron (ZOFRAN-ODT) 4 MG ODT tab Take 1 tablet (4 mg) by mouth every 12 hours as needed for nausea 180 tablet 3    oxyCODONE (ROXICODONE) 5 MG tablet Take 1 tablet (5 mg) by mouth 3 times daily as needed for severe pain. 40 tablet 0    spironolactone (ALDACTONE) 25 MG tablet Take 1 tablet (25 mg) by mouth daily at 2 pm. (Patient taking differently: Take 50 mg by mouth daily at 2 pm.) 90 tablet 3    tiZANidine (ZANAFLEX) 4 MG tablet Take 1 tablet (4 mg) by mouth at bedtime. 90 tablet 3    Turmeric 500 MG CAPS Take 500 mg by mouth every morning         Medical History: any changes in medical history since they were last seen? { :129050::\"No\"}      Objective:    Physical Exam:  not currently breastfeeding.  Constitutional: Well developed, well nourished, appears stated age.  Gait: {GAIT:954247}  HEENT: Head atraumatic, normocephalic. Eyes without conjunctival injection or jaundice. Oropharynx clear. Neck supple. No obvious neck masses.  Skin: No rash, lesions, or petechiae of exposed skin.   Psychiatric/mental status: Alert, without lethargy or stupor. Speech fluent. Appropriate affect. Mood normal. Able to follow commands without difficulty.     Diagnostic Tests/Imaging/Labs:  ***    BILLING TIME DOCUMENTATION:   The total TIME spent on this patient on the date of the encounter/appointment was *** minutes.      TOTAL TIME includes:   Time spent preparing to see the patient (reviewing records and tests)   Time spent face to face (or over the phone) with the patient   Time spent ordering tests, medications, procedures and " referrals   Time spent Referring and communicating with other healthcare professionals   Time spent documenting clinical information in Epic

## 2025-05-27 NOTE — NURSING NOTE
RN reviewed AVS with patient. Patient to contact clinic if any questions/concerns. Patient verbalized understanding.    Angela Baumann RN

## 2025-05-28 ENCOUNTER — OFFICE VISIT (OUTPATIENT)
Dept: PHYSICAL MEDICINE AND REHAB | Facility: CLINIC | Age: 75
End: 2025-05-28
Payer: COMMERCIAL

## 2025-05-28 ENCOUNTER — TELEPHONE (OUTPATIENT)
Dept: ANESTHESIOLOGY | Facility: CLINIC | Age: 75
End: 2025-05-28

## 2025-05-28 VITALS
DIASTOLIC BLOOD PRESSURE: 84 MMHG | SYSTOLIC BLOOD PRESSURE: 128 MMHG | HEART RATE: 85 BPM | RESPIRATION RATE: 16 BRPM | OXYGEN SATURATION: 98 %

## 2025-05-28 DIAGNOSIS — Z98.1 S/P SPINAL FUSION: ICD-10-CM

## 2025-05-28 DIAGNOSIS — G58.8 CLUNEAL NEUROPATHY: ICD-10-CM

## 2025-05-28 DIAGNOSIS — Z87.81 HISTORY OF VERTEBRAL COMPRESSION FRACTURE: ICD-10-CM

## 2025-05-28 DIAGNOSIS — M53.3 SACROILIAC JOINT PAIN: Primary | ICD-10-CM

## 2025-05-28 PROCEDURE — 3074F SYST BP LT 130 MM HG: CPT | Performed by: PHYSICAL MEDICINE & REHABILITATION

## 2025-05-28 PROCEDURE — 99215 OFFICE O/P EST HI 40 MIN: CPT | Performed by: PHYSICAL MEDICINE & REHABILITATION

## 2025-05-28 PROCEDURE — 3079F DIAST BP 80-89 MM HG: CPT | Performed by: PHYSICAL MEDICINE & REHABILITATION

## 2025-05-28 NOTE — NURSING NOTE
Chief Complaint   Patient presents with    New Patient     [facility] spine   Lucas Shah

## 2025-05-28 NOTE — LETTER
5/28/2025       RE: Nadira Perez  [address]  [place] [place] [address]     Dear Colleague,    Thank you for referring your patient, Nadira Perez, to the [facility] PHYSICAL MEDICINE AND REHABILITATION CLINIC [place] at [facility]. Please see a copy of my visit note below.    Patient seen at the request of Dr. Kimball for an opinion and evaluation of interventions for her chronic SI pain aside from steroids injections .      Today's date: 5/28/2025     HISTORY OF PRESENT ILLNESS:  Nadira Perez is a 74 year old female with L5-S1 posterior fusion, chronic pedicle fracture of L2, chronic T11 compression fracture and scoliosis who presents with a chief complaint of low back and lower extremity pain.     Briefly, patient has a longstanding history of chronic low back and lumbosacral pain.  She has followed in the cancer rehabilitation clinic, pain clinic, and sports medicine clinic.  I had performed repeat fluoroscopically guided bilateral sacroiliac injections for her x 2, having had these done with the pain clinic previously.  The injection in October 2024 provided quite good relief with +50% improvement in pain and functionality for at least 3 months.  This was repeated in February 2025 with less appreciable response.  She does feel that she had good relief that lasted for a few short weeks but the percent improvement and duration of benefit was not as pronounced from prior injections.    However, she does report having fallen asleep at her computer doing volunteer work, hospitalization and bedrest with subsequent flareup/worsening pain.  She was referred to our clinic for further evaluation and discussion of potential interventional options for modulation of pain.  She had questions regarding potential nerve blocks and or ablation procedures that could help with her pain and symptoms.     On her visit today, she had taken some pain  medications prior to her appointment and history is somewhat more challenging to obtain but she localizes pain symptoms to the lumbosacral junction with radiating symptoms to the lower limb on occasion.  The primary pain is localized to the lumbosacral junction.  Pain score 6/10 today at rest and 8/10 that worsened last week.  Pain is described as constant burning, cramping, sharp, pressure-like in nature.  Pain is worse with generalized activity, weightbearing.  She has been more reliant on use of assistive device and presents today in a manual wheelchair from the clinic.  She does report pain-related sleep disturbance.     PRIOR INJURIES/TREATMENT:   Ice/Heat: +  Physical Therapy:   Multiple courses of prior PT      - Current Pain Medications -   Gabapentin 1200 mg TID  Methocarbamol 500 mg prn   Oxycodone 5 mg prn   Tylenol prn  CBD    - Prior/Trialed Pain Medications -   NSAIDs: Ibuprofen, celecoxib  Muscle relaxants: Flexeril  Opioids: Codeine    Prior Procedures:  Date    Procedure   Improvement (%)  09/12/23 Caudal GREGOR  10/02/24 B SIJ   +50% +3mos  02/26/25 B SIJ   40-50% 2-3mos           Prior Related Surgery:   H/o L5/S1 posterior spinal fusion    Other (acupuncture, OMT, CMM, TENS, DME, etc.):   Prior acupuncture, chiropractic care    Specialists Seen - (with most recent, available notes and clinic visits reviewed)   1. Sports Medicine - Dr. Kimball - Virtual Visit with Sahil Kimball MD (05/15/2025)   2. Pain Medicine - PADMINI Adhikari, CNP - Office Visit with Renée Harris APRN CNP (05/27/2025)   3. Orthopedic hand - Dr. KITTY Conley - Office Visit with Rolan Conley MD (04/15/2025)   4. Cancer rehab - Dr. JERROD Centeno - Oncology Visit with Carmen Centeno MD (03/14/2025)     IMAGING - reviewed   CT Lumbar Spine w/o Contrast (01/31/2025 10:36 AM)   Similar nonhealed left L2 pedicle fracture (series 6, image 47). No evidence of acute fracture or posttraumatic subluxation.     IMPRESSION:  1.  Postsurgical  changes and multilevel lumbar spondylosis, as above, without significant change. No progressive degeneration since comparison CT.  2.  No evidence of instrumentation fracture or loosening.    Review Of Systems:  I am responding to those symptoms which are directly relevant to the specific indication for my consultation. I recommend that the patient follow up with their primary or referring provider to pursue any other symptoms which may be of concern.       Medical History:  She  has a past medical history of Arthritis, Bone disease, Breast cancer (H), Diabetes (H), H/O kyphoplasty, Hearing problem, History of blood transfusion, History of kidney stones, History of radiation therapy, Hyperlipemia, Hypertension, Hypopotassemia, Irregular heart beat, Kidney problem, Lymph edema, Medullary sponge kidney, Osteopenia, PONV (postoperative nausea and vomiting), Reduced vision, Squamous cell skin cancer, and Thyroid disease.     She  has a past surgical history that includes Parathyroidectomy (09/23/2004); Rhinoplasty (1968); thyr proc skin closed cosmetic manner by subcuticular stitch (01/23/2009); Mastectomy modified radical (Bilateral); hernia repair; Wrist surgery; Tonsillectomy (1977); Hysterectomy total abdominal (05/03/2000); Parathyroidectomy (09/23/2004); Arthrodesis wrist (Right); External ear surgery; Arthrodesis wrist (02/14/2013); Graft bone from iliac crest (02/14/2013); Remove hardware hand (09/24/2013); Colonoscopy (12/24/2013); Abdomen surgery; Eye surgery; biopsy; BREATH HYDROGEN TEST (N/A, 10/14/2016); Esophagoscopy, gastroscopy, duodenoscopy (EGD), combined (N/A, 11/23/2016); cataract iol, rt/lt (Right, 03/13/2018); cataract iol, rt/lt (Left, 02/20/2018); Thyroplasty (10/09/2009); Fusion, Spine, Interbody, Oblique Anterior And Lumbar, 2 Levels, Post Approach, Using Ots (N/A, 11/18/2021); Optical tracking system fusion spine posterior lumbar one level (N/A, 11/18/2021); Release carpal tunnel (Right,  12/02/2021); Blepharoplasty bilateral (Bilateral, 05/06/2022); Cosmetic blepharoplasty lower lids bilateral (Bilateral, 05/06/2022); Vitrectomy parsplana with 25 gauge system (Left, 06/20/2022); Inject joint sacroiliac (Left, 04/27/2023); Inject epidural lumbar (N/A, 05/23/2023); Inject epidural caudal (N/A, 09/12/2023); Cosmetic surgery; GI surgery; Inject epidural caudal (N/A, 01/02/2024); Colonoscopy (N/A, 03/12/2024); Esophagoscopy, gastroscopy, duodenoscopy (EGD), combined (N/A, 03/12/2024); Otoplasty (Bilateral, 07/25/2024); Inject joint sacroiliac (Bilateral, 10/02/2024); Inject Sacroiliac Joint (Bilateral, 02/26/2025); Laparoscopy diagnostic (general) (N/A, 05/04/2025); Resect small bowel without ostomy (N/A, 05/04/2025); and Picc Insertion - Double Lumen (Right, 05/08/2025).    Family History  Her family history includes Asthma in her maternal grandfather, maternal grandmother, and another family member; Breast Cancer in her cousin and cousin; Cancer in some other family members; Cerebrovascular Disease in her mother and other family members; Chronic Obstructive Pulmonary Disease in her maternal grandfather; Circulatory in her brother; Coronary Artery Disease in her father; Dementia in her brother, mother, and another family member; Diabetes in her maternal aunt, maternal grandmother, mother, and another family member; Glaucoma in her maternal aunt; Heart Disease in her father; Hyperlipidemia in her father and mother; Hypertension in her father and other family members; Melanoma in her maternal aunt; Mental Illness in her father; Neurologic Disorder in her mother; Obesity in some other family members; Osteoporosis in her mother; Other Cancer in an other family member; Peripheral Neuropathy in her brother and brother; Respiratory in an other family member; Thyroid Disease in her mother.     Social History:  Work: Retired critical care RN. Continues volunteer work   Current living situation: lives in [place].   She  reports that she has never smoked. She has never been exposed to tobacco smoke. She has never used smokeless tobacco. She reports current alcohol use of about 7.0 standard drinks of alcohol per week. She reports current drug use. Drug: Marijuana.        Current Medications:   She has a current medication list which includes the following prescription(s): acetaminophen, artificial tears, atorvastatin, carvedilol, cranberry, diclofenac, gabapentin, HEMP OIL OR EXTRACT OR OTHER CBD CANNABINOID, NOT MEDICAL CANNABIS,, hydrochlorothiazide, levothyroxine, lisinopril, magnesium glycinate, melatonin, methocarbamol, methocarbamol, multi-vitamin, naloxone, omeprazole, ondansetron, ondansetron, ondansetron, oxycodone, spironolactone, tizanidine, and turmeric.     Allergies:    -- Erythromycin -- Nausea   -- Penicillins -- Hives    --  Around age 6 - doesn't recall full reaction,             mother told her it was hives.             Has tolerated cephalsporins.    PHYSICAL EXAMINATION:  /84   Pulse 85   Resp 16   LMP  (LMP Unknown)   SpO2 98%    General: Pleasant, straightforward, WDWN individual.  Mental Status: Pleasant, direct, appropriate mood and affect  Resp: breathing is unlabored without audible wheeze  Vascular: No visible cyanosis, no venous stasis changes  Heme: No visible ecchymosis or erythema on extremities  Skin: No notable rash    Neurologic:  Strength: All major muscle groups of the bilateral lower extremities have equal and symmetric muscle strength EXCEPT pain limited hip flexion. +r foot/ankle weakness     Musculoskeletal:  Lumbar Spine: Severely reduced ROM all planes, poor standing balance due to pain, tender to palpation over lumbosacral junction, Seated Str Leg Raise N/A, seated hip provocative maneuvers produce back pain only.       SI joint provacative maneuvers difficult to assess due to pain - TTP SIJ (sacral sulcus/PSIS), Hang (pt pointing) Chaitanya guzman Ga[place]len and  Yeomen deferred due to positioning (cannot tolerate), lat pelvic compression pos, posterior spring pos, standing flexion test NTCristino deferred   Tender over iliac crest on the right and with positive Tinel's over the superior and middle cluneal regions    ASSESSMENT:  Nadira Perez is a pleasant 74 year old female who presents with:    #. Sacroiliac joint pain  (primary encounter diagnosis).    #. S/P spinal fusion L5-S1   #. Cluneal neuropathy  #. History of vertebral compression fracture  #.  History of thoracolumbar scoliosis  #. Chronic Lumbosacral radicular pain      PLAN:  -Refer for repeat bilateral sacroiliac joint injections. This patient has been assessed and evaluated as a candidate for repeat injection at the same location previously treated and found to have had at least 50% improvement in pain and 50% improvement in functionality consistently for at least 3 months from prior injections.  Her most recent injection in February may have been confounded with hospitalization and prolonged bedrest.  Therefore, I think it is reasonable to trial 1 more SI joint injection to reassess her response.  -We also reviewed the anatomy of cluneal neuropathy and possible cluneal nerve blocks given her history of spinal fusion.  Lastly, we can consider caudal GREGOR for lumbosacral radicular pain.  -She is also interested in a spine surgery consultation for possible SI joint fusion which I think is reasonable for her to review that with one of my colleagues.  She can continue follow-up with the pain clinic.  -We also discussed SI joint lateral branch blocks/radiofrequency ablation.  Unfortunately, these are considered investigational through insurance/CMS and effectively unavailable to pursue at this time.  -RTC following procedure.     Ready to learn, no apparent learning barriers.  Education provided on treatment plan according to patient's preferred learning style.  Patient verbalizes understanding.    __________________________________  Thais Saldivar MD  Physical Medicine & Rehabilitation        I spent a total of 40 minutes on the day of the visit.   Time spent by me today doing chart review, history and exam, documentation and further activities per the note        Again, thank you for allowing me to participate in the care of your patient.      Sincerely,    Thais Saldivar MD

## 2025-05-28 NOTE — PROGRESS NOTES
Patient seen at the request of Dr. Kimball for an opinion and evaluation of interventions for her chronic SI pain aside from steroids injections .      Today's date: 5/28/2025     HISTORY OF PRESENT ILLNESS:  Nadira Perez is a 74 year old female with L5-S1 posterior fusion, chronic pedicle fracture of L2, chronic T11 compression fracture and scoliosis who presents with a chief complaint of low back and lower extremity pain.     Briefly, patient has a longstanding history of chronic low back and lumbosacral pain.  She has followed in the cancer rehabilitation clinic, pain clinic, and sports medicine clinic.  I had performed repeat fluoroscopically guided bilateral sacroiliac injections for her x 2, having had these done with the pain clinic previously.  The injection in October 2024 provided quite good relief with +50% improvement in pain and functionality for at least 3 months.  This was repeated in February 2025 with less appreciable response.  She does feel that she had good relief that lasted for a few short weeks but the percent improvement and duration of benefit was not as pronounced from prior injections.    However, she does report having fallen asleep at her computer doing volunteer work, hospitalization and bedrest with subsequent flareup/worsening pain.  She was referred to our clinic for further evaluation and discussion of potential interventional options for modulation of pain.  She had questions regarding potential nerve blocks and or ablation procedures that could help with her pain and symptoms.     On her visit today, she had taken some pain medications prior to her appointment and history is somewhat more challenging to obtain but she localizes pain symptoms to the lumbosacral junction with radiating symptoms to the lower limb on occasion.  The primary pain is localized to the lumbosacral junction.  Pain score 6/10 today at rest and 8/10 that worsened last week.  Pain is described as constant  burning, cramping, sharp, pressure-like in nature.  Pain is worse with generalized activity, weightbearing.  She has been more reliant on use of assistive device and presents today in a manual wheelchair from the clinic.  She does report pain-related sleep disturbance.     PRIOR INJURIES/TREATMENT:   Ice/Heat: +  Physical Therapy:   Multiple courses of prior PT      - Current Pain Medications -   Gabapentin 1200 mg TID  Methocarbamol 500 mg prn   Oxycodone 5 mg prn   Tylenol prn  CBD    - Prior/Trialed Pain Medications -   NSAIDs: Ibuprofen, celecoxib  Muscle relaxants: Flexeril  Opioids: Codeine    Prior Procedures:  Date    Procedure   Improvement (%)  09/12/23 Caudal GREGOR  10/02/24 B SIJ   +50% +3mos  02/26/25 B SIJ   40-50% 2-3mos           Prior Related Surgery:   H/o L5/S1 posterior spinal fusion    Other (acupuncture, OMT, CMM, TENS, DME, etc.):   Prior acupuncture, chiropractic care    Specialists Seen - (with most recent, available notes and clinic visits reviewed)   1. Sports Medicine - Dr. Kimball - Virtual Visit with Sahil Kimball MD (05/15/2025)   2. Pain Medicine - PADMINI Adhikari, CNP - Office Visit with Renée Harris APRN CNP (05/27/2025)   3. Orthopedic hand - Dr. KITTY Conley - Office Visit with Rolan Conley MD (04/15/2025)   4. Cancer rehab - Dr. JERROD Centeno - Oncology Visit with Carmen Cetneno MD (03/14/2025)     IMAGING - reviewed   CT Lumbar Spine w/o Contrast (01/31/2025 10:36 AM)   Similar nonhealed left L2 pedicle fracture (series 6, image 47). No evidence of acute fracture or posttraumatic subluxation.     IMPRESSION:  1.  Postsurgical changes and multilevel lumbar spondylosis, as above, without significant change. No progressive degeneration since comparison CT.  2.  No evidence of instrumentation fracture or loosening.    Review Of Systems:  I am responding to those symptoms which are directly relevant to the specific indication for my consultation. I recommend that the patient follow up with  their primary or referring provider to pursue any other symptoms which may be of concern.       Medical History:  She  has a past medical history of Arthritis, Bone disease, Breast cancer (H), Diabetes (H), H/O kyphoplasty, Hearing problem, History of blood transfusion, History of kidney stones, History of radiation therapy, Hyperlipemia, Hypertension, Hypopotassemia, Irregular heart beat, Kidney problem, Lymph edema, Medullary sponge kidney, Osteopenia, PONV (postoperative nausea and vomiting), Reduced vision, Squamous cell skin cancer, and Thyroid disease.     She  has a past surgical history that includes Parathyroidectomy (09/23/2004); Rhinoplasty (1968); thyr proc skin closed cosmetic manner by subcuticular stitch (01/23/2009); Mastectomy modified radical (Bilateral); hernia repair; Wrist surgery; Tonsillectomy (1977); Hysterectomy total abdominal (05/03/2000); Parathyroidectomy (09/23/2004); Arthrodesis wrist (Right); External ear surgery; Arthrodesis wrist (02/14/2013); Graft bone from iliac crest (02/14/2013); Remove hardware hand (09/24/2013); Colonoscopy (12/24/2013); Abdomen surgery; Eye surgery; biopsy; BREATH HYDROGEN TEST (N/A, 10/14/2016); Esophagoscopy, gastroscopy, duodenoscopy (EGD), combined (N/A, 11/23/2016); cataract iol, rt/lt (Right, 03/13/2018); cataract iol, rt/lt (Left, 02/20/2018); Thyroplasty (10/09/2009); Fusion, Spine, Interbody, Oblique Anterior And Lumbar, 2 Levels, Post Approach, Using Ots (N/A, 11/18/2021); Optical tracking system fusion spine posterior lumbar one level (N/A, 11/18/2021); Release carpal tunnel (Right, 12/02/2021); Blepharoplasty bilateral (Bilateral, 05/06/2022); Cosmetic blepharoplasty lower lids bilateral (Bilateral, 05/06/2022); Vitrectomy parsplana with 25 gauge system (Left, 06/20/2022); Inject joint sacroiliac (Left, 04/27/2023); Inject epidural lumbar (N/A, 05/23/2023); Inject epidural caudal (N/A, 09/12/2023); Cosmetic surgery; GI surgery; Inject epidural  caudal (N/A, 01/02/2024); Colonoscopy (N/A, 03/12/2024); Esophagoscopy, gastroscopy, duodenoscopy (EGD), combined (N/A, 03/12/2024); Otoplasty (Bilateral, 07/25/2024); Inject joint sacroiliac (Bilateral, 10/02/2024); Inject Sacroiliac Joint (Bilateral, 02/26/2025); Laparoscopy diagnostic (general) (N/A, 05/04/2025); Resect small bowel without ostomy (N/A, 05/04/2025); and Picc Insertion - Double Lumen (Right, 05/08/2025).    Family History  Her family history includes Asthma in her maternal grandfather, maternal grandmother, and another family member; Breast Cancer in her cousin and cousin; Cancer in some other family members; Cerebrovascular Disease in her mother and other family members; Chronic Obstructive Pulmonary Disease in her maternal grandfather; Circulatory in her brother; Coronary Artery Disease in her father; Dementia in her brother, mother, and another family member; Diabetes in her maternal aunt, maternal grandmother, mother, and another family member; Glaucoma in her maternal aunt; Heart Disease in her father; Hyperlipidemia in her father and mother; Hypertension in her father and other family members; Melanoma in her maternal aunt; Mental Illness in her father; Neurologic Disorder in her mother; Obesity in some other family members; Osteoporosis in her mother; Other Cancer in an other family member; Peripheral Neuropathy in her brother and brother; Respiratory in an other family member; Thyroid Disease in her mother.     Social History:  Work: Retired critical care RN. Continues volunteer work   Current living situation: lives in [place].   She  reports that she has never smoked. She has never been exposed to tobacco smoke. She has never used smokeless tobacco. She reports current alcohol use of about 7.0 standard drinks of alcohol per week. She reports current drug use. Drug: Marijuana.        Current Medications:   She has a current medication list which includes the following prescription(s):  acetaminophen, artificial tears, atorvastatin, carvedilol, cranberry, diclofenac, gabapentin, HEMP OIL OR EXTRACT OR OTHER CBD CANNABINOID, NOT MEDICAL CANNABIS,, hydrochlorothiazide, levothyroxine, lisinopril, magnesium glycinate, melatonin, methocarbamol, methocarbamol, multi-vitamin, naloxone, omeprazole, ondansetron, ondansetron, ondansetron, oxycodone, spironolactone, tizanidine, and turmeric.     Allergies:    -- Erythromycin -- Nausea   -- Penicillins -- Hives    --  Around age 6 - doesn't recall full reaction,             mother told her it was hives.             Has tolerated cephalsporins.    PHYSICAL EXAMINATION:  /84   Pulse 85   Resp 16   LMP  (LMP Unknown)   SpO2 98%    General: Pleasant, straightforward, WDWN individual.  Mental Status: Pleasant, direct, appropriate mood and affect  Resp: breathing is unlabored without audible wheeze  Vascular: No visible cyanosis, no venous stasis changes  Heme: No visible ecchymosis or erythema on extremities  Skin: No notable rash    Neurologic:  Strength: All major muscle groups of the bilateral lower extremities have equal and symmetric muscle strength EXCEPT pain limited hip flexion. +r foot/ankle weakness     Musculoskeletal:  Lumbar Spine: Severely reduced ROM all planes, poor standing balance due to pain, tender to palpation over lumbosacral junction, Seated Str Leg Raise N/A, seated hip provocative maneuvers produce back pain only.       SI joint provacative maneuvers difficult to assess due to pain - TTP SIJ (sacral sulcus/PSIS), Hang (pt pointing) pos, Chaitanya pos, Gaenslen and Yeomen deferred due to positioning (cannot tolerate), lat pelvic compression pos, posterior spring pos, standing flexion test NTCristino deferred   Tender over iliac crest on the right and with positive Tinel's over the superior and middle cluneal regions    ASSESSMENT:  Nadira Perez is a pleasant 74 year old female who presents with:    #. Sacroiliac joint pain   (primary encounter diagnosis).    #. S/P spinal fusion L5-S1   #. Cluneal neuropathy  #. History of vertebral compression fracture  #.  History of thoracolumbar scoliosis  #. Chronic Lumbosacral radicular pain      PLAN:  -Refer for repeat bilateral sacroiliac joint injections. This patient has been assessed and evaluated as a candidate for repeat injection at the same location previously treated and found to have had at least 50% improvement in pain and 50% improvement in functionality consistently for at least 3 months from prior injections.  Her most recent injection in February may have been confounded with hospitalization and prolonged bedrest.  Therefore, I think it is reasonable to trial 1 more SI joint injection to reassess her response.  -We also reviewed the anatomy of cluneal neuropathy and possible cluneal nerve blocks given her history of spinal fusion.  Lastly, we can consider caudal GREGOR for lumbosacral radicular pain.  -She is also interested in a spine surgery consultation for possible SI joint fusion which I think is reasonable for her to review that with one of my colleagues.  She can continue follow-up with the pain clinic.  -We also discussed SI joint lateral branch blocks/radiofrequency ablation.  Unfortunately, these are considered investigational through insurance/CMS and effectively unavailable to pursue at this time.  -RTC following procedure.     Ready to learn, no apparent learning barriers.  Education provided on treatment plan according to patient's preferred learning style.  Patient verbalizes understanding.   __________________________________  Thais Saldivar MD  Physical Medicine & Rehabilitation        I spent a total of 40 minutes on the day of the visit.   Time spent by me today doing chart review, history and exam, documentation and further activities per the note

## 2025-05-28 NOTE — TELEPHONE ENCOUNTER
Left Voicemail (1st Attempt) and Sent Mychart (1st Attempt) for the patient to call back and schedule the following:    Appointment type: Return Pain  Provider: Renée Harris  Visit mode: In Person or Virtual Visit  Return date: Approx. 7/1/25  Specialty phone number: [phone]    Additional Notes:

## 2025-05-28 NOTE — PATIENT INSTRUCTIONS
It was a pleasure seeing you today in our [facility]. We discussed the following:    #. [facility] Pain Injection Scheduling    An order for a Repeat Bilateral Sacroiliac Joint Injections at [facility] has been placed today.   You should receive a call from our surgery schedulers to arrange this appointment. If you do not hear from them within the next 2-4 business days, feel free to call [phone] then option 2.     Dr. Saldivar's Procedure Locations:     [facility]   Address: [address] [place] [id number]   5th floor procedure/surgical center  Procedure [phone]    [facility] [place]   Address: [address], [place] [address]   2nd floor procedure/surgical center  Clinic [phone]    [facility]   Address: [address], [place] [address]  Phone: [phone]             Depending on your response to repeat SI joint injections, we also discussed the possibility of:  1.  Cluneal nerve blocks  2.  Caudal epidural steroid injection    #.  I have also placed a referral for orthopedic spine surgery to discuss potential sacroiliac joint fusion surgery.    #.  Continue to follow with the pain clinic for chronic pain management      Follow-up: after procedure.

## 2025-05-29 ENCOUNTER — TELEPHONE (OUTPATIENT)
Dept: PHYSICAL MEDICINE AND REHAB | Facility: CLINIC | Age: 75
End: 2025-05-29

## 2025-05-29 ENCOUNTER — RESULTS FOLLOW-UP (OUTPATIENT)
Dept: INTERNAL MEDICINE | Facility: CLINIC | Age: 75
End: 2025-05-29

## 2025-05-29 ENCOUNTER — LAB (OUTPATIENT)
Dept: LAB | Facility: CLINIC | Age: 75
End: 2025-05-29
Payer: COMMERCIAL

## 2025-05-29 ENCOUNTER — OFFICE VISIT (OUTPATIENT)
Dept: INTERNAL MEDICINE | Facility: CLINIC | Age: 75
End: 2025-05-29
Payer: COMMERCIAL

## 2025-05-29 ENCOUNTER — PATIENT OUTREACH (OUTPATIENT)
Dept: CARE COORDINATION | Facility: CLINIC | Age: 75
End: 2025-05-29

## 2025-05-29 VITALS
BODY MASS INDEX: 25.91 KG/M2 | HEART RATE: 78 BPM | RESPIRATION RATE: 16 BRPM | HEIGHT: 65 IN | OXYGEN SATURATION: 98 % | DIASTOLIC BLOOD PRESSURE: 76 MMHG | WEIGHT: 155.5 LBS | TEMPERATURE: 98.4 F | SYSTOLIC BLOOD PRESSURE: 128 MMHG

## 2025-05-29 DIAGNOSIS — Z01.818 PRE-OP EXAM: ICD-10-CM

## 2025-05-29 DIAGNOSIS — Z78.0 POST-MENOPAUSE: ICD-10-CM

## 2025-05-29 DIAGNOSIS — Z01.818 PREOP GENERAL PHYSICAL EXAM: ICD-10-CM

## 2025-05-29 DIAGNOSIS — E11.9 TYPE 2 DIABETES MELLITUS WITHOUT COMPLICATION, WITHOUT LONG-TERM CURRENT USE OF INSULIN (H): Primary | ICD-10-CM

## 2025-05-29 DIAGNOSIS — E11.9 DIABETES MELLITUS, TYPE 2 (H): ICD-10-CM

## 2025-05-29 LAB
ALBUMIN SERPL BCG-MCNC: 3.8 G/DL (ref 3.5–5.2)
ALP SERPL-CCNC: 109 U/L (ref 40–150)
ALT SERPL W P-5'-P-CCNC: 12 U/L (ref 0–50)
ANION GAP SERPL CALCULATED.3IONS-SCNC: 11 MMOL/L (ref 7–15)
AST SERPL W P-5'-P-CCNC: 23 U/L (ref 0–45)
BILIRUB SERPL-MCNC: 0.4 MG/DL
BUN SERPL-MCNC: 26.9 MG/DL (ref 8–23)
CALCIUM SERPL-MCNC: 9.1 MG/DL (ref 8.8–10.4)
CHLORIDE SERPL-SCNC: 104 MMOL/L (ref 98–107)
CREAT SERPL-MCNC: 0.9 MG/DL (ref 0.51–0.95)
EGFRCR SERPLBLD CKD-EPI 2021: 68 ML/MIN/1.73M2
ERYTHROCYTE [DISTWIDTH] IN BLOOD BY AUTOMATED COUNT: 15.8 % (ref 10–15)
GABAPENTIN UR QL CFM: PRESENT
GLUCOSE SERPL-MCNC: 85 MG/DL (ref 70–99)
HCO3 SERPL-SCNC: 25 MMOL/L (ref 22–29)
HCT VFR BLD AUTO: 30.8 % (ref 35–47)
HGB BLD-MCNC: 9.7 G/DL (ref 11.7–15.7)
MCH RBC QN AUTO: 29.7 PG (ref 26.5–33)
MCHC RBC AUTO-ENTMCNC: 31.5 G/DL (ref 31.5–36.5)
MCV RBC AUTO: 94 FL (ref 78–100)
OXYCODONE UR CFM-MCNC: 688 NG/ML
OXYCODONE/CREAT UR: 756 NG/MG {CREAT}
OXYMORPHONE UR CFM-MCNC: 3820 NG/ML
OXYMORPHONE/CREAT UR: 4198 NG/MG {CREAT}
PLATELET # BLD AUTO: 360 10E3/UL (ref 150–450)
POTASSIUM SERPL-SCNC: 3.9 MMOL/L (ref 3.4–5.3)
PROT SERPL-MCNC: 6 G/DL (ref 6.4–8.3)
RBC # BLD AUTO: 3.27 10E6/UL (ref 3.8–5.2)
SODIUM SERPL-SCNC: 140 MMOL/L (ref 135–145)
WBC # BLD AUTO: 8.5 10E3/UL (ref 4–11)

## 2025-05-29 PROCEDURE — 3074F SYST BP LT 130 MM HG: CPT | Performed by: NURSE PRACTITIONER

## 2025-05-29 PROCEDURE — 99417 PROLNG OP E/M EACH 15 MIN: CPT | Performed by: NURSE PRACTITIONER

## 2025-05-29 PROCEDURE — 99214 OFFICE O/P EST MOD 30 MIN: CPT | Mod: 24 | Performed by: NURSE PRACTITIONER

## 2025-05-29 PROCEDURE — 3078F DIAST BP <80 MM HG: CPT | Performed by: NURSE PRACTITIONER

## 2025-05-29 PROCEDURE — 1111F DSCHRG MED/CURRENT MED MERGE: CPT | Performed by: NURSE PRACTITIONER

## 2025-05-29 NOTE — CONFIDENTIAL NOTE
Nadira Perez is a 74 year old female with a past medical history as noted below, presents for follow up after diagnostic laparoscopy converted to Open laparotomy with small bowel resection and primary repair of a ventral hernia surgery.     GENERAL/PSYCH: Patient is awake, A&Ox3, NAD, stable mood, good judgement and insight.  HEAD: Atraumatic, Normocephalic  EYES: Anicteric. Pupils equal and reactive  NECK: No masses/LNs. Trachea midline.  CHEST: Symmetrical, Respiratory effort WNL, no stridor.  HEART: Regular Rate and Rhythm.   ABDOMEN: Soft, non distended with minimal tenderness  INCISION: healing well, no drainage with minimal pain  LOWER EXTREMITIES: No gross deformity. Pulses palpable and equal bilaterally.  SKIN: No visible generalized rash.

## 2025-05-29 NOTE — PROGRESS NOTES
Preoperative Evaluation  [facility] INTERNAL MEDICINE [address]  4TH [facility] [address]  Phone: [phone]  Fax: [phone]  Primary Provider: MERCEDES Kyle CNP  Pre-op Performing Provider: MRECEDES Kyle CNP  May 29, 2025             5/29/2025   Surgical Information   What procedure is being done? Right revision functional otoplasty with use of cartilage graft from the contralateral left ear    Facility or Hospital where procedure/surgery will be performed: [facility] OR 1    Who is doing the procedure / surgery? Juan Agudelo MD    Date of surgery / procedure: 06/04/2025    Time of surgery / procedure: 8:20 AM    Where do you plan to recover after surgery? at home with family        Proxy-reported     Fax number for surgical facility: Note does not need to be faxed, will be available electronically in Epic.    Assessment & Plan     The proposed surgical procedure is considered LOW risk.    Pre-op exam  - Comprehensive metabolic panel (BMP + Alb, Alk Phos, ALT, AST, Total. Bili, TP); Future  - Albumin Random Urine Quantitative with Creat Ratio; Future  - CBC with platelets; Future  EKG was obtained 6/3/25.See reading.    Results for orders placed or performed in visit on 05/29/25   Comprehensive metabolic panel (BMP + Alb, Alk Phos, ALT, AST, Total. Bili, TP)     Status: Abnormal   Result Value Ref Range    Sodium 140 135 - 145 mmol/L    Potassium 3.9 3.4 - 5.3 mmol/L    Carbon Dioxide (CO2) 25 22 - 29 mmol/L    Anion Gap 11 7 - 15 mmol/L    Urea Nitrogen 26.9 (H) 8.0 - 23.0 mg/dL    Creatinine 0.90 0.51 - 0.95 mg/dL    GFR Estimate 68 >60 mL/min/1.73m2    Calcium 9.1 8.8 - 10.4 mg/dL    Chloride 104 98 - 107 mmol/L    Glucose 85 70 - 99 mg/dL    Alkaline Phosphatase 109 40 - 150 U/L    AST 23 0 - 45 U/L    ALT 12 0 - 50 U/L    Protein Total 6.0 (L) 6.4 - 8.3 g/dL    Albumin 3.8 3.5 - 5.2 g/dL    Bilirubin Total 0.4 <=1.2 mg/dL   CBC with  platelets     Status: Abnormal   Result Value Ref Range    WBC Count 8.5 4.0 - 11.0 10e3/uL    RBC Count 3.27 (L) 3.80 - 5.20 10e6/uL    Hemoglobin 9.7 (L) 11.7 - 15.7 g/dL    Hematocrit 30.8 (L) 35.0 - 47.0 %    MCV 94 78 - 100 fL    MCH 29.7 26.5 - 33.0 pg    MCHC 31.5 31.5 - 36.5 g/dL    RDW 15.8 (H) 10.0 - 15.0 %    Platelet Count 360 150 - 450 10e3/uL       Recommendation  She is advised to bring her CPAP to surgery with her.     I spent a total of 65 minutes in the care of this pt during today's office visit. This time includes reviewing the patient's chart and prior history, obtaining a history, performing an examination and evaluation and counseling the patient. This time also includes ordering medications or tests necessary in addition to communication to other member's of the patient's health care team. Time spent in documentation and care coordination is included.     Matilde Overkamp APRN, CNP        Subjective   Ismael is a 74 year old, presenting for the following:  Pre-Op Exam          5/29/2025     2:49 PM   Additional Questions   Roomed by [email or web address]             5/29/2025   Pre-Op Questionnaire   Have you ever had a heart attack or stroke? No    Have you ever had surgery on your heart or blood vessels, such as a stent placement, a coronary artery bypass, or surgery on an artery in your head, neck, heart, or legs? No    Do you have chest pain with activity? No    Do you have a history of heart failure? No    Do you currently have a cold, bronchitis or symptoms of other infection? No    Do you have a cough, shortness of breath, or wheezing? No    Do you or anyone in your family have previous history of blood clots? No    Do you or does anyone in your family have a serious bleeding problem such as prolonged bleeding following surgeries or cuts? No    Have you ever had problems with anemia or been told to take iron pills? (!) YES     Have you had any abnormal blood loss such as black, tarry or bloody stools, or  abnormal vaginal bleeding? No    Have you ever had a blood transfusion? (!) YES    Have you ever had a transfusion reaction? No    Are you willing to have a blood transfusion if it is medically needed before, during, or after your surgery? Yes    Have you or any of your relatives ever had problems with anesthesia? No    Do you have sleep apnea, excessive snoring or daytime drowsiness? (!) YES    Do you have a CPAP machine? Yes    Do you have any artifical heart valves or other implanted medical devices like a pacemaker, defibrillator, or continuous glucose monitor? No    Do you have artificial joints? (!) UNKNOWN    Are you allergic to latex? 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    Corneal opacity    Hypothyroidism    Osteopenia, unspecified location    Aromatase inhibitor use    Chronic pain of right knee    DDD (degenerative disc disease), lumbar    Degenerative scoliosis in adult patient    Carpal tunnel syndrome of right wrist    Peripheral neuropathy    Spinal stenosis of lumbar region with neurogenic claudication    Spondylolisthesis of lumbar region    Brain lesion    Dermatochalasis of both upper eyelids    S/P spinal fusion    Chronic bilateral low back pain    PVD (posterior vitreous detachment), left eye    Vitreous opacities of left eye    Closed nondisplaced fracture of lateral malleolus of left fibula, initial encounter    Acute left  ankle pain    Sacroiliitis    Lumbar radiculopathy    Diabetes mellitus, type 2 (H)    Abnormal electrocardiogram    Abnormal result of cardiovascular function study, unspecified    Closed fracture of sternum, unspecified portion of sternum, initial encounter    Congenital abnormality of ear    Sacroiliac joint pain    Impairment of balance    Constricted ear, right    Obstruction concurrent with and due to hernia of abdominal wall            Past Surgical History:   Procedure Laterality Date    ABDOMEN SURGERY      ovarian cyst, mesh    ARTHRODESIS WRIST Right     ARTHRODESIS WRIST  02/14/2013    Procedure: ARTHRODESIS WRIST;  left wrist scaphoid excision, four bone fusion, iliac crest bone graft  ( Mac with block);  Surgeon: Av Mendez MD;  [facility]    BIOPSY      skin, vaginal    BLEPHAROPLASTY BILATERAL Bilateral 05/06/2022    Procedure: UPPER BLEPHAROPLASTY, BILATERAL;  Surgeon: Jemal Sanchez MD;  Location: [facility] OR    CATARACT IOL, RT/LT Right 03/13/2018    CATARACT IOL, RT/LT Left 02/20/2018    COLONOSCOPY  12/24/2013    Procedure: COMBINED COLONOSCOPY, SINGLE BIOPSY/POLYPECTOMY BY BIOPSY;  COLONOSCOPY;  Surgeon: Dom Alvarez MD;  Location: [facility]    COLONOSCOPY N/A 03/12/2024    Procedure: COLONOSCOPY, FLEXIBLE, WITH LESION REMOVAL USING SNARE;  Surgeon: Amina Espinoza MD;  Location: [facility]    COSMETIC BLEPHAROPLASTY LOWER LIDS BILATERAL Bilateral 05/06/2022    Procedure: BLEPHAROPLASTY, LOWER EYELID, BILATERAL, COSMETIC;  Surgeon: Jemal Sanchez MD;  Location: [facility] OR    COSMETIC SURGERY      ESOPHAGOSCOPY, GASTROSCOPY, DUODENOSCOPY (EGD), COMBINED N/A 11/23/2016    Procedure: COMBINED ESOPHAGOSCOPY, GASTROSCOPY, DUODENOSCOPY (EGD);  Surgeon: Quinten Feliciano MD;  Location: [facility]    ESOPHAGOSCOPY, GASTROSCOPY, DUODENOSCOPY (EGD), COMBINED N/A 03/12/2024    Procedure: ESOPHAGOGASTRODUODENOSCOPY, WITH BIOPSY;  Surgeon: Amina Espinoza MD;  Location: [facility]    EXTERNAL EAR SURGERY       right    EYE SURGERY      radial keratomy    FUSION, SPINE, INTERBODY, OBLIQUE ANTERIOR AND LUMBAR, 2 LEVELS, POST APPROACH, USING OTS N/A 11/18/2021    Procedure: Part 1: Oblique Anterior Interbody Fusion at Lumbar 5 to sacral 1 with use of Bone Morphogenic Protein,;  Surgeon: Serge Ramirez MD;  [facility]    GI SURGERY      Umbilical hernia repair    GRAFT BONE FROM ILIAC CREST  02/14/2013    Procedure: GRAFT BONE FROM ILIAC CREST;  mac with block and local infilitration;  Surgeon: Av Mendez MD;  [facility]     BREATH HYDROGEN TEST N/A 10/14/2016    Procedure: HYDROGEN BREATH TEST;  Surgeon: Cheri Barron MD;  [facility]: [facility]    HERNIA REPAIR      umbilical age 18 mos.    HYSTERECTOMY      HYSTERECTOMY TOTAL ABDOMINAL  05/03/2000    INJECT EPIDURAL CAUDAL N/A 09/12/2023    Procedure: Caudal epidural steroid injection with fluoroscopy;  Surgeon: Natasha Umaña MD;  Location: [facility] OR    INJECT EPIDURAL CAUDAL N/A 01/02/2024    Procedure: INJECTION, EPIDURAL, CAUDAL;  Surgeon: Natasha Umaña MD;  Location: [facility] OR    INJECT EPIDURAL LUMBAR N/A 05/23/2023    Procedure: L3-4 interlaminar epidural steroid injection with fluoroscopy ( left> right);  Surgeon: Natasha Umaña MD;  Location: [facility] OR    INJECT JOINT SACROILIAC Left 04/27/2023    Procedure: Left sacroiliac joint steroid injection with fluoroscopy;  Surgeon: Natasha Umaña MD;  Location: [facility] OR    INJECT JOINT SACROILIAC Bilateral 10/02/2024    Procedure: Bilateral sacroiliac joint injection;  Surgeon: Thais Saldivar MD;  Location: [facility] OR    INJECT SACROILIAC JOINT Bilateral 02/26/2025    Procedure: Bilateral sacroiliac joint steroid injections;  Surgeon: Thais Saldivar MD;  Location: [facility] OR    LAPAROSCOPY DIAGNOSTIC (GENERAL) N/A 05/04/2025    Procedure: LAPAROSCOPY, DIAGNOSTIC with lysis of adhesions, converted to;  Surgeon: Jacobo Cintron DO;  [facility]    MASTECTOMY  MODIFIED RADICAL Bilateral     bilateral; right breast prophylactic    OOPHOROPEXY      OPTICAL TRACKING SYSTEM FUSION POSTERIOR SPINE LUMBAR N/A 11/18/2021    Procedure: Open Posterior Instrumented Spinal Fusion at Lumbar 5 to Sacral 1, with grimm aguayo osteotomy, use of O-Arm/Stealth;  Surgeon: Serge Ramirez MD;  [facility]    OTOPLASTY Bilateral 07/25/2024    Procedure: Right Revision Functional Otoplasty;  Surgeon: Juan Agudelo MD;  Location: [facility] OR    PARATHYROIDECTOMY  09/23/2004    R SUPERIOR    PARATHYROIDECTOMY  09/23/2004    parathyroid resection, subtotal    PICC INSERTION - DOUBLE LUMEN Right 05/08/2025    38-1cm, Basilic vein    [place] HAND/FINGER SURGERY UNLISTED  Bilateral partial wrist fusions [id number]    [place] SPINE SURG[place] PROCEDURE UNLISTED  December 2021    [place] STOMACH SURGERY PROCEDURE UNLISTED  Several throughout the years    RELEASE CARPAL TUNNEL Right 12/02/2021    Procedure: RIGHT CARPAL TUNNEL RELEASE, RIGHT WRIST HARDWARE REMOVAL, RIGHT CARPAL BOSS EXCISION;  Surgeon: Rolan Conley MD;  Location: [facility] OR    REMOVE HARDWARE HAND  09/24/2013    Procedure: REMOVE HARDWARE HAND;  Left Hand Screw Removal       RESECT SMALL BOWEL WITHOUT OSTOMY N/A 05/04/2025    Procedure: open laparotomy with small bowel resection x1;  Surgeon: Jacobo Cintron DO;  Location: [facility]    RHINOPLASTY  1968    thyr proc skin closed cosmetic manner by subcuticular stitch  01/23/2009    THYROPLASTY  10/09/2009    TONSILLECTOMY  1977    VITRECTOMY PARSPLANA WITH 25 GAUGE SYSTEM Left 06/20/2022    Procedure: LEFT EYE VITRECTOMY, PARS PLANA APPROACH, USING 25-GAUGE INSTRUMENTS, laser;  Surgeon: Felix Carlos MD;  Location: [facility] OR    WRIST SURGERY      wrist arthrodesis     Current Outpatient Medications   Medication Sig Dispense Refill    acetaminophen (TYLENOL) 500 MG tablet Take 1,000 mg by mouth 3 times daily      artificial tears OINT ophthalmic ointment Place into  both eyes nightly as needed for dry eyes.      atorvastatin (LIPITOR) 80 MG tablet Take 1 tablet (80 mg) by mouth daily. 90 tablet 3    carvedilol (COREG) 12.5 MG tablet Take 1 tablet (12.5 mg) by mouth 2 times daily (with meals). 180 tablet 3    CRANBERRY EXTRACT PO Take 500 mg by mouth every morning      diclofenac (VOLTAREN) 1 % topical gel Apply 2 g topically 4 times daily 100 g 6    gabapentin (NEURONTIN) 300 MG capsule Take 4 capsules (1,200 mg) by mouth 3 times daily. 1080 capsule 3    HEMP OIL OR EXTRACT OR OTHER CBD CANNABINOID, NOT MEDICAL CANNABIS, THC      hydrochlorothiazide (HYDRODIURIL) 25 MG tablet Take 1 tablet (25 mg) by mouth daily. 90 tablet 3    levothyroxine (SYNTHROID/LEVOTHROID) 112 MCG tablet TAKE 1/2 TAB BY MOUTH EVERY DAY 45 tablet 3    lisinopril (ZESTRIL) 20 MG tablet Take 1 tablet (20 mg) by mouth 2 times daily. 180 tablet 3    MAGNESIUM GLYCINATE PO Take 800 mg by mouth at bedtime.      Melatonin 10 MG TABS tablet Take 10 mg by mouth nightly as needed.      methocarbamol (ROBAXIN) 500 MG tablet Take 1 tablet (500 mg) by mouth every 8 hours as needed for muscle spasms. 30 tablet 0    methocarbamol (ROBAXIN) 500 MG tablet Take 1 tablet (500 mg) by mouth 4 times daily as needed for muscle spasms. 30 tablet 0    Multiple Vitamin (MULTI-VITAMIN) per tablet Take 1 tablet by mouth every morning      naloxone (NARCAN) 4 MG/0.1ML nasal spray Spray 1 spray (4 mg) into one nostril alternating nostrils as needed for opioid reversal. every 2-3 minutes until assistance arrives 0.2 mL 0    omeprazole (PRILOSEC) 20 MG DR capsule Take 1 capsule (20 mg) by mouth daily. 90 capsule 3    ondansetron (ZOFRAN ODT) 4 MG ODT tab Take 1 tablet (4 mg) by mouth every 8 hours as needed for nausea. 90 tablet 0    ondansetron (ZOFRAN) 4 MG tablet Take 1 tablet (4 mg) by mouth every 6 hours as needed for nausea. 12 tablet 3    ondansetron (ZOFRAN-ODT) 4 MG ODT tab Take 1 tablet (4 mg) by mouth every 12 hours as  "needed for nausea 180 tablet 3    oxyCODONE (ROXICODONE) 5 MG tablet Take 1 tablet (5 mg) by mouth every 6 hours as needed for severe pain. #90 tabs to last 30 days. May fill 5/27 90 tablet 0    spironolactone (ALDACTONE) 25 MG tablet Take 1 tablet (25 mg) by mouth daily at 2 pm. (Patient taking differently: Take 50 mg by mouth daily at 2 pm.) 90 tablet 3    tiZANidine (ZANAFLEX) 4 MG tablet Take 1 tablet (4 mg) by mouth at bedtime. 90 tablet 3    Turmeric 500 MG CAPS Take 500 mg by mouth every morning         Allergies   Allergen Reactions    Erythromycin Nausea    Penicillin G     Penicillins Hives     Around age 6 - doesn't recall full reaction, mother told her it was hives.     Has tolerated cephalsporins.         Social History     Tobacco Use    Smoking status: Never     Passive exposure: Never    Smokeless tobacco: Never   Substance Use Topics    Alcohol use: Yes     Alcohol/week: 7.0 standard drinks of alcohol     Types: 7 Glasses of wine per week     Comment: Rare if ever       History   Drug Use    Types: Marijuana     Comment: Smoke through water filter for chronic back pain PRN.           Review of Systems  Constitutional, HEENT, cardiovascular, pulmonary, gi and gu systems are negative, except as otherwise noted.    Objective    /76 (BP Location: Right arm, Patient Position: Sitting, Cuff Size: Adult Regular)   Pulse 78   Temp 98.4  F (36.9  C) (Oral)   Resp 16   Ht 1.651 m (5' 5\")   Wt 70.5 kg (155 lb 8 oz)   LMP  (LMP Unknown)   SpO2 98%   BMI 25.88 kg/m     Estimated body mass index is 25.88 kg/m  as calculated from the following:    Height as of this encounter: 1.651 m (5' 5\").    Weight as of this encounter: 70.5 kg (155 lb 8 oz).    GENERAL: alert. She is in a wheelchair.  EARS: nl TMs. Congenital ear right.  THROAT: nl  RESP: lungs clear to auscultation - no rales, rhonchi or wheezes  CV: regular rate and rhythm, normal S1 S2, no S3 or S4, no murmur, click or rub.  She has bilateral " LE edema 1+. Capillary refill normal.  MS: She ambulates to exam table with a crouched stature.  SKIN: no suspicious lesions or rashes.  NEURO: grossly normal and mentation intact  PSYCH: mentation appears normal, affect normal/bright

## 2025-05-29 NOTE — TELEPHONE ENCOUNTER
Called patient to schedule procedure with Dr. Saldivar    Date of Procedure: 7/2/25    Arrival time given: Yes: Arrival Time 9:30am      Procedure Location: [facility]     Verified Location with Patient:  Yes  Address provided to the patient     Pre-op H&P Required:  No: Local anesthesia       Post-Op/Follow Up Appt:  Yes: 7/30/25 @2pm      Informed patient they will need a  to drive them home:  No    Patients : Spouse     Patient is aware that pre-op RN from the procedure center will call 2-3 days prior to scheduled procedure to confirm arrival time and review any instructions:  Yes       Additional Comments: N/A        Dory Bowman MA on 5/29/2025 at 11:53 AM      P: [phone]

## 2025-05-30 ENCOUNTER — RESULTS FOLLOW-UP (OUTPATIENT)
Dept: ANESTHESIOLOGY | Facility: CLINIC | Age: 75
End: 2025-05-30

## 2025-05-30 ENCOUNTER — MYC MEDICAL ADVICE (OUTPATIENT)
Dept: INTERNAL MEDICINE | Facility: CLINIC | Age: 75
End: 2025-05-30

## 2025-05-30 ENCOUNTER — ANESTHESIA EVENT (OUTPATIENT)
Dept: SURGERY | Facility: AMBULATORY SURGERY CENTER | Age: 75
End: 2025-05-30
Payer: COMMERCIAL

## 2025-05-30 LAB
CREAT UR-MCNC: 90.1 MG/DL
MICROALBUMIN UR-MCNC: 13.5 MG/L
MICROALBUMIN/CREAT UR: 14.98 MG/G CR (ref 0–25)

## 2025-05-31 NOTE — PATIENT INSTRUCTIONS
How to Take Your Medication Before Surgery  Preoperative Medication Instructions   Antiplatelet or Anticoagulation Medication Instructions   - We reviewed the medication list and the patient is not on an antiplatelet or anticoagulation medications.   - Bleeding risk is low for this procedure (e.g. dental, skin, cataract).   - aspirin: Bleeding risk is low for this procedure and daily aspirin may be continued without modification.     Additional Medication Instructions  We reviewed the medication list and there are no chronic medications that need to be adjusted for this procedure.       Patient Education   Preparing for Your Surgery  For Adults  Getting started  In most cases, a nurse will call to review your health history and instructions. They will give you an arrival time based on your scheduled surgery time. Please be ready to share:  Your doctor's clinic name and phone number  Your medical, surgical, and anesthesia history  A list of allergies and sensitivities  A list of medicines, including herbal treatments and over-the-counter drugs  Whether the patient has a legal guardian (ask how to send us the papers in advance)  Note: You may not receive a call if you were seen at our PAC (Preoperative Assessment Center).  Please tell us if you're pregnant--or if there's any chance you might be pregnant. Some surgeries may injure a fetus (unborn baby), so they require a pregnancy test. Surgeries that are safe for a fetus don't always need a test, and you can choose whether to have one.   Preparing for surgery  Within 10 to 30 days of surgery: Have a pre-op exam (sometimes called an H&P, or History and Physical). This can be done at a clinic or pre-operative center.  If you're having a , you may not need this exam. Talk to your care team.  At your pre-op exam, talk to your care team about all medicines you take. (This includes CBD oil and any drugs, such as THC, marijuana, and other forms of cannabis.) If you  need to stop any medicine before surgery, ask when to start taking it again.  This is for your safety. Many medicines and drugs can make you bleed too much during surgery. Some change how well surgery (anesthesia) drugs work.  Call your insurance company to let them know you're having surgery. (If you don't have insurance, call [phone].)  Call your clinic if there's any change in your health. This includes a scrape or scratch near the surgery site, or any signs of a cold (sore throat, runny nose, cough, rash, fever).  Eating and drinking guidelines  For your safety: Unless your surgeon tells you otherwise, follow the guidelines below.  Eat and drink as normal until 8 hours before you arrive for surgery. After that, no food or milk. You can spit out gum when you arrive.  Drink clear liquids until 2 hours before you arrive. These are liquids you can see through, like water, Gatorade, and Propel Water. They also include plain black coffee and tea (no cream or milk).  No alcohol for 24 hours before you arrive. The night before surgery, stop any drinks that contain THC.  If your care team tells you to take medicine on the morning of surgery, it's okay to take it with a sip of water. No other medicines or drugs are allowed (including CBD oil)--follow your care team's instructions.  If you have questions the day of surgery, call your hospital or surgery center.   Preventing infection  Shower or bathe the night before and the morning of surgery. Follow the instructions your clinic gave you. (If no instructions, use regular soap.)  Don't shave or clip hair near your surgery site. We'll remove the hair if needed.  Don't smoke or vape the morning of surgery. No chewing tobacco for 6 hours before you arrive. A nicotine patch is okay. You may spit out nicotine gum when you arrive.  For some surgeries, the surgeon will tell you to fully quit smoking and nicotine.  We will make every effort to keep you safe from infection. We  will:  Clean our hands often with soap and water (or an alcohol-based hand rub).  Clean the skin at your surgery site with a special soap that kills germs.  Give you a special gown to keep you warm. (Cold raises the risk of infection.)  Wear hair covers, masks, gowns, and gloves during surgery.  Give antibiotic medicine, if prescribed. Not all surgeries need this medicine.  What to bring on the day of surgery  Photo ID and insurance card  Copy of your health care directive, if you have one  Glasses and hearing aids (bring cases)  You can't wear contacts during surgery  Inhaler and eye drops, if you use them (tell us about these when you arrive)  CPAP machine or breathing device, if you use them  A few personal items, if spending the night  If you have . . .  A pacemaker, ICD (cardiac defibrillator), or other implant: Bring the ID card.  An implanted stimulator: Bring the remote control.  A legal guardian: Bring a copy of the certified (court-stamped) guardianship papers.  Please remove any jewelry, including body piercings. Leave jewelry and other valuables at home.  If you're going home the day of surgery  You must have a support person drive you home. They should stay with you overnight, and they may need to help with your self-care.  If you don't have a support person, please tells us as soon as possible. We can help.  After surgery  If it's hard to control your pain or you need more pain medicine, please call your surgeon's office.  Questions?   If you have any questions for your care team, list them here:   ____________________________________________________________________________________________________________________________________________________________________________________________________________________________________________________________  For informational purposes only. Not to replace the advice of your health care provider. Copyright   2003, 2019 [facility]. All rights  reserved. Clinically reviewed by Roverto Moran MD. [employer] [id number] - REV 02/25.

## 2025-06-02 ENCOUNTER — PATIENT OUTREACH (OUTPATIENT)
Dept: CARE COORDINATION | Facility: CLINIC | Age: 75
End: 2025-06-02
Payer: COMMERCIAL

## 2025-06-02 ENCOUNTER — TELEPHONE (OUTPATIENT)
Dept: INTERNAL MEDICINE | Facility: CLINIC | Age: 75
End: 2025-06-02
Payer: COMMERCIAL

## 2025-06-02 NOTE — TELEPHONE ENCOUNTER
[facility]    Phone Message    May a detailed message be left on voicemail: yes     Reason for Call: Order(s): Home Care Orders: Physical Therapy (PT): Would like to extend her P/T 1 x a week for 4 weeks.      Action Taken: Message routed to:  [facility]    Travel Screening: Not Applicable     Date of Service:

## 2025-06-03 ENCOUNTER — MYC MEDICAL ADVICE (OUTPATIENT)
Dept: INTERNAL MEDICINE | Facility: CLINIC | Age: 75
End: 2025-06-03

## 2025-06-03 DIAGNOSIS — Z01.818 PREOP GENERAL PHYSICAL EXAM: ICD-10-CM

## 2025-06-03 DIAGNOSIS — Z01.818 PRE-OP EXAM: ICD-10-CM

## 2025-06-03 DIAGNOSIS — Z01.818 PRE-OP EXAM: Primary | ICD-10-CM

## 2025-06-03 LAB
ATRIAL RATE - MUSE: 77 BPM
DIASTOLIC BLOOD PRESSURE - MUSE: NORMAL MMHG
INTERPRETATION ECG - MUSE: NORMAL
P AXIS - MUSE: 64 DEGREES
PR INTERVAL - MUSE: 158 MS
QRS DURATION - MUSE: 108 MS
QT - MUSE: 414 MS
QTC - MUSE: 468 MS
R AXIS - MUSE: -78 DEGREES
SYSTOLIC BLOOD PRESSURE - MUSE: NORMAL MMHG
T AXIS - MUSE: 48 DEGREES
VENTRICULAR RATE- MUSE: 77 BPM

## 2025-06-03 ASSESSMENT — ENCOUNTER SYMPTOMS: SEIZURES: 0

## 2025-06-03 ASSESSMENT — LIFESTYLE VARIABLES: TOBACCO_USE: 0

## 2025-06-03 NOTE — TELEPHONE ENCOUNTER
Patient called asking if she needs to do labs too prior to the surgery, she did some last time she saw Matilde Quiñonez, can you check please and let her know, thanks.

## 2025-06-03 NOTE — TELEPHONE ENCOUNTER
LOV 05/29/2025      Called [facility] [employer] and left a secure VM. Gave verbal orders per MERCEDES Montes De Oca CNP for Order(s): Home Care Orders: Physical Therapy (PT): Would like to extend her P/T 1 x a week for 4 weeks.       Alexandre Colon CMA ([facility]) at 9:02 AM on 6/3/2025

## 2025-06-03 NOTE — TELEPHONE ENCOUNTER
I called the pt and informed the EKG order. Pt is scheduled at the lab for EKG done today.    Norma  ----------------------------------------------------------------------------------      ----- Message from Matilde Quiñonez sent at 6/2/2025 10:31 PM CDT -----  Regarding: RE: pre-0p EKG  Yes, thanks.     Matilde LONG CNP  ----- Message -----  From: Norma Contreras RN  Sent: 6/2/2025  10:34 AM CDT  To: MERCEDES Rivera CNP  Subject: RE: pre-0p EKG                                   No EKG since 9/27/24. Do you want her to have a nurse visit for EKG?    Norma  ----- Message -----  From: Matilde Quiñonez APRN CNP  Sent: 5/31/2025   1:07 PM CDT  To: Norma Contreras RN  Subject: pre-0p EKG                                       Norma, this patient just had a 10 day hospitalization and major surgery. I cannot find an EKG on file. Can you see if I missed something? If there has not been an EKG in the past 2 months we should have her get one before her surgery.    Thanks.   Matilde LONG, BLUE

## 2025-06-03 NOTE — ANESTHESIA PREPROCEDURE EVALUATION
Anesthesia Pre-Procedure Evaluation    Patient: Nadira Perez   MRN: [id number] : 1950          Procedure : Procedure(s):  Right revision functional otoplasty with use of cartilage graft from the contralateral left ear         Past Medical History:   Diagnosis Date    Anemia Off and on from years back    Arthritis     Bone disease     Breast cancer (H)     Cataract     Chest pain Since 26 sternal fracture (secondary to fall), pain level started high, then decreased to moderate two weeks later; then to mild to none since then.  Sneezing and coughing bring it up again to a moderate level.    Chronic osteoarthritis Many years    Diabetes (H)     Difficult intubation     Use Peds #5 due to throat kyphoplasty    Gastroesophageal reflux disease     H/O kyphoplasty     Hearing problem     Hepatitis     Monohepatitis    History of blood transfusion     History of kidney stones     History of radiation therapy     Hyperlipemia     Hyperlipidemia Recent concern with elevated results (been on statins for years).    Hypertension     Hypopotassemia     Irregular heart beat     Kidney problem     Lymph edema     Medullary sponge kidney     Motion sickness     Balance issues    Osteopenia     PONV (postoperative nausea and vomiting)     Reduced vision     Scoliosis Sceondary to advanced osteoporosis.    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    Thyroid disease       Past Surgical History:   Procedure Laterality Date    ABDOMEN SURGERY      ovarian cyst, mesh    ARTHRODESIS WRIST Right     ARTHRODESIS WRIST  2013    Procedure: ARTHRODESIS WRIST;  left wrist scaphoid excision, four bone fusion, iliac crest bone graft  ( Mac with block);  Surgeon: Av Mendez MD;  [facility]    BIOPSY      skin, vaginal     BLEPHAROPLASTY BILATERAL Bilateral 05/06/2022    Procedure: UPPER BLEPHAROPLASTY, BILATERAL;  Surgeon: Jemal Sanchez MD;  Location: [facility] OR    CATARACT IOL, RT/LT Right 03/13/2018    CATARACT IOL, RT/LT Left 02/20/2018    COLONOSCOPY  12/24/2013    Procedure: COMBINED COLONOSCOPY, SINGLE BIOPSY/POLYPECTOMY BY BIOPSY;  COLONOSCOPY;  Surgeon: Dom Alvarez MD;  Location:  GI    COLONOSCOPY N/A 03/12/2024    Procedure: COLONOSCOPY, FLEXIBLE, WITH LESION REMOVAL USING SNARE;  Surgeon: Amina Espinoza MD;  Location: [facility]    COSMETIC BLEPHAROPLASTY LOWER LIDS BILATERAL Bilateral 05/06/2022    Procedure: BLEPHAROPLASTY, LOWER EYELID, BILATERAL, COSMETIC;  Surgeon: Jemal Sanchez MD;  Location: [facility] OR    COSMETIC SURGERY      ESOPHAGOSCOPY, GASTROSCOPY, DUODENOSCOPY (EGD), COMBINED N/A 11/23/2016    Procedure: COMBINED ESOPHAGOSCOPY, GASTROSCOPY, DUODENOSCOPY (EGD);  Surgeon: Quinten Feliciano MD;  Location: [facility]    ESOPHAGOSCOPY, GASTROSCOPY, DUODENOSCOPY (EGD), COMBINED N/A 03/12/2024    Procedure: ESOPHAGOGASTRODUODENOSCOPY, WITH BIOPSY;  Surgeon: Amina Espinoza MD;  Location: [facility]    EXTERNAL EAR SURGERY      right    EYE SURGERY      radial keratomy    FUSION, SPINE, INTERBODY, OBLIQUE ANTERIOR AND LUMBAR, 2 LEVELS, POST APPROACH, USING OTS N/A 11/18/2021    Procedure: Part 1: Oblique Anterior Interbody Fusion at Lumbar 5 to sacral 1 with use of Bone Morphogenic Protein,;  Surgeon: Serge Ramirez MD;  [facility]    GI SURGERY      Umbilical hernia repair    GRAFT BONE FROM ILIAC CREST  02/14/2013    Procedure: GRAFT BONE FROM ILIAC CREST;  mac with block and local infilitration;  Surgeon: Av Mendez MD;  [facility]     BREATH HYDROGEN TEST N/A 10/14/2016    Procedure: HYDROGEN BREATH TEST;  Surgeon: Cheri Barron MD;  Location:  GI    HERNIA REPAIR      umbilical age 18 mos.    HYSTERECTOMY      HYSTERECTOMY TOTAL ABDOMINAL  05/03/2000    INJECT  EPIDURAL CAUDAL N/A 09/12/2023    Procedure: Caudal epidural steroid injection with fluoroscopy;  Surgeon: Natasha Umaña MD;  Location: [facility] OR    INJECT EPIDURAL CAUDAL N/A 01/02/2024    Procedure: INJECTION, EPIDURAL, CAUDAL;  Surgeon: Natasha Umaña MD;  Location: [facility] OR    INJECT EPIDURAL LUMBAR N/A 05/23/2023    Procedure: L3-4 interlaminar epidural steroid injection with fluoroscopy ( left> right);  Surgeon: Natasha Umaña MD;  Location: [facility] OR    INJECT JOINT SACROILIAC Left 04/27/2023    Procedure: Left sacroiliac joint steroid injection with fluoroscopy;  Surgeon: Natasha Umaña MD;  Location: [facility] OR    INJECT JOINT SACROILIAC Bilateral 10/02/2024    Procedure: Bilateral sacroiliac joint injection;  Surgeon: Thais Saldivar MD;  Location: [facility] OR    INJECT SACROILIAC JOINT Bilateral 02/26/2025    Procedure: Bilateral sacroiliac joint steroid injections;  Surgeon: Thais Saldivar MD;  Location: [facility] OR    LAPAROSCOPY DIAGNOSTIC (GENERAL) N/A 05/04/2025    Procedure: LAPAROSCOPY, DIAGNOSTIC with lysis of adhesions, converted to;  Surgeon: Jacobo Cintron DO;  Location: [facility]    MASTECTOMY MODIFIED RADICAL Bilateral     bilateral; right breast prophylactic    OOPHOROPEXY      OPTICAL TRACKING SYSTEM FUSION POSTERIOR SPINE LUMBAR N/A 11/18/2021    Procedure: Open Posterior Instrumented Spinal Fusion at Lumbar 5 to Sacral 1, with grimm aguayo osteotomy, use of O-Arm/Stealth;  Surgeon: Serge Ramirez MD;  [facility]    OTOPLASTY Bilateral 07/25/2024    Procedure: Right Revision Functional Otoplasty;  Surgeon: Juan Agudelo MD;  Location: [facility] OR    PARATHYROIDECTOMY  09/23/2004    R SUPERIOR    PARATHYROIDECTOMY  09/23/2004    parathyroid resection, subtotal    PICC INSERTION - DOUBLE LUMEN Right 05/08/2025    38-1cm, Basilic vein    DC HAND/FINGER SURGERY UNLISTED  Bilateral partial wrist fusions [id number]    DC SPINE SURGERY PROCEDURE UNLISTED  December 2021    DC  STOMACH SURGERY PROCEDURE UNLISTED  Several throughout the years    RELEASE CARPAL TUNNEL Right 12/02/2021    Procedure: RIGHT CARPAL TUNNEL RELEASE, RIGHT WRIST HARDWARE REMOVAL, RIGHT CARPAL BOSS EXCISION;  Surgeon: Rolan Conley MD;  Location: [facility] OR    REMOVE HARDWARE HAND  09/24/2013    Procedure: REMOVE HARDWARE HAND;  Left Hand Screw Removal       RESECT SMALL BOWEL WITHOUT OSTOMY N/A 05/04/2025    Procedure: open laparotomy with small bowel resection x1;  Surgeon: Jacobo Cintron DO;  Location: [facility] OR    RHINOPLASTY  1968    thyr proc skin closed cosmetic manner by subcuticular stitch  01/23/2009    THYROPLASTY  10/09/2009    TONSILLECTOMY  1977    VITRECTOMY PARSPLANA WITH 25 GAUGE SYSTEM Left 06/20/2022    Procedure: LEFT EYE VITRECTOMY, PARS PLANA APPROACH, USING 25-GAUGE INSTRUMENTS, laser;  Surgeon: Felix Carlos MD;  Location: [facility] OR    WRIST SURGERY      wrist arthrodesis      Allergies   Allergen Reactions    Erythromycin Nausea    Penicillin G     Penicillins Hives     Around age 6 - doesn't recall full reaction, mother told her it was hives.     Has tolerated cephalsporins.       Social History     Tobacco Use    Smoking status: Never     Passive exposure: Never    Smokeless tobacco: Never   Substance Use Topics    Alcohol use: Yes     Alcohol/week: 7.0 standard drinks of alcohol     Types: 7 Glasses of wine per week     Comment: Rare if ever      Wt Readings from Last 1 Encounters:   05/29/25 70.5 kg (155 lb 8 oz)        Anesthesia Evaluation   Pt has had prior anesthetic. Type: MAC and General.    History of anesthetic complications  - .  failed MAC with ear revision. Also needs hyperangle VL blade for intubation. 6.5 ETT.    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  States she may  need smaller ETT) -                          (-) tobacco use   Neurologic:  - neg neurologic ROS  (-) no seizures and no CVA   Cardiovascular: Comment: MRI 4/2024  CONCLUSIONS:   1. Mildly reduced LV systolic function with LVEF of 50%.  2. Late gadolinium enhancement imaging shows mid inferolateral wall subendocardial delayed enhancement that  could represent a small size infarct.      (+) Dyslipidemia hypertension- -   -  - -                                 Previous cardiac testing   Echo: Date: 4/2024 Results:  Left ventricular function is decreased. The ejection fraction is 50-55%  (borderline). Mid inferolateral akinesis is present.  Global right ventricular function is normal.  No pericardial effusion is present.  Stress Test:  Date: Results:    ECG Reviewed:  Date: 6/2024 Results:  Sinus rhythm   Left axis deviation   Incomplete right bundle branch block   Minimal voltage criteria for LVH, may be normal variant   Cannot rule out Inferior infarct (cited on or before 16-FEB-2024)   Abnormal ECG     Cath:  Date: Results:   (-) taking anticoagulants/antiplatelets   METS/Exercise Tolerance: >4 METS Comment: Cycling, reformer. Denies cardiac symptoms   Hematologic:     (+)       history of blood transfusion, no previous transfusion reaction,     (-) history of blood clots   Musculoskeletal: Comment: Congenital deformity of ear  Sternal fracture in February       GI/Hepatic:     (+) GERD,     hiatal hernia,       liver disease,       Renal/Genitourinary:  - neg Renal ROS     Endo:     (+)  type II DM, Last HgA1c: 6.2, date: 3/2024, Not using insulin,     thyroid problem, hypothyroidism,        (-) chronic steroid usage   Psychiatric/Substance Use:  - neg psychiatric ROS     Infectious Disease:  - neg infectious disease ROS     Malignancy:  - neg malignancy ROS (+) Malignancy, History of Skin and Breast.    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 EGS consulted for incarcerated ventral hernia.             Physical Exam  Airway  Mallampati: II  TM distance: >3 FB  Neck ROM: full  Mouth opening: >= 4 cm    Cardiovascular - normal exam  Rhythm: regular  Rate: normal rate   Comments: MRI 4/2024  CONCLUSIONS:   1. Mildly reduced LV systolic function with LVEF of 50%.  2. Late gadolinium enhancement imaging shows mid inferolateral wall subendocardial delayed enhancement that  could represent a small size infarct.    Dental   (+) Modest Abnormalities - crowns, retainers, 1 or 2 missing teeth      Pulmonary - normal examBreath sounds clear to auscultation        Neurological - normal exam  She appears awake, alert and oriented x3.    Other Findings  LMA Size: 4; Airway Brand: LMA Unique; Attempts: 1  Last intubation was with VL and had a grade 1 view WITH picking up the epiglottis.           OUTSIDE LABS:  CBC:   Lab Results   Component Value Date    WBC 8.5 05/29/2025    WBC 8.8 05/07/2025    HGB 9.7 (L) 05/29/2025    HGB 9.1 (L) 05/07/2025    HCT 30.8 (L) 05/29/2025    HCT 28.1 (L) 05/07/2025     05/29/2025     05/13/2025     BMP:   Lab Results   Component Value Date     05/29/2025     05/09/2025    POTASSIUM 3.9 05/29/2025    POTASSIUM 3.7 05/13/2025    CHLORIDE 104 05/29/2025    CHLORIDE 104 05/09/2025    CO2 25 05/29/2025    CO2 21 (L) 05/09/2025    BUN 26.9 (H) 05/29/2025    BUN 12.7 05/09/2025    CR 0.90 05/29/2025    CR 0.66 05/13/2025    GLC 85 05/29/2025    GLC 88 05/09/2025     COAGS:   Lab Results   Component Value Date    PTT 28 05/04/2025    INR 1.09 05/04/2025     POC:   Lab Results   Component Value Date    BGM 98 01/16/2008     HEPATIC:   Lab Results   Component Value Date    ALBUMIN 3.8 05/29/2025    PROTTOTAL 6.0 (L) 05/29/2025     "ALT 12 05/29/2025    AST 23 05/29/2025    ALKPHOS 109 05/29/2025    BILITOTAL 0.4 05/29/2025     OTHER:   Lab Results   Component Value Date    PH 7.39 11/18/2021    LACT 1.5 05/04/2025    A1C 6.3 (H) 03/14/2025    RAMBO 9.1 05/29/2025    PHOS 2.4 (L) 05/13/2025    MAG 1.9 05/13/2025    LIPASE 177 09/15/2016    TSH 1.54 03/14/2025    T4 1.05 11/21/2011    CRP <2.9 12/08/2021    SED 31 (H) 12/08/2021       Anesthesia Plan    ASA Status:  3      NPO Status: NPO Appropriate   Anesthesia Type: General.  Airway: supraglottic airway.  Induction: intravenous.  Maintenance: TIVA.   Techniques and Equipment:     - Airway:  Planned airway equipment includes supraglottic airway.     - Monitoring Plan: standard ASA monitoring  Equipment Comments: Patient failed her last attempt at a MAC local for ear revision sx. Used LMA     Consents    Anesthesia Plan(s) and associated risks, benefits, and realistic alternatives discussed. Questions answered and patient/representative(s) expressed understanding.     - Discussed: anesthesiologist     - Discussed with:  Patient        - Pt is DNR/DNI Status: no DNR     Blood Consent:      - Discussed with: not discussed.     Postoperative Care         Comments:    Other Comments: Patient stopped her PPI recently. Patient was given Reglan 5mg IV 30 minutes prior to surgery to help with gastric emptying. She was also given Bicitra to neutralize any remaining gastric contents(should there be any).               Bee Neil MD    I have reviewed the pertinent notes and labs in the chart from the past 30 days and (re)examined the patient.  Any updates or changes from those notes are reflected in this note.    Clinically Significant Risk Factors Present on Admission                   # Hypertension: Noted on problem list           # Overweight: Estimated body mass index is 25.88 kg/m  as calculated from the following:    Height as of 5/29/25: 1.651 m (5' 5\").    Weight as of 5/29/25: 70.5 kg (155 " lb 8 oz).       # Financial/Environmental Concerns:

## 2025-06-04 ENCOUNTER — ANESTHESIA (OUTPATIENT)
Dept: SURGERY | Facility: AMBULATORY SURGERY CENTER | Age: 75
End: 2025-06-04
Payer: COMMERCIAL

## 2025-06-04 ENCOUNTER — HOSPITAL ENCOUNTER (OUTPATIENT)
Facility: AMBULATORY SURGERY CENTER | Age: 75
Discharge: HOME | End: 2025-06-04
Attending: STUDENT IN AN ORGANIZED HEALTH CARE EDUCATION/TRAINING PROGRAM
Payer: COMMERCIAL

## 2025-06-04 VITALS
TEMPERATURE: 97.5 F | OXYGEN SATURATION: 99 % | SYSTOLIC BLOOD PRESSURE: 140 MMHG | HEART RATE: 79 BPM | BODY MASS INDEX: 25.91 KG/M2 | WEIGHT: 155.52 LBS | HEIGHT: 65 IN | RESPIRATION RATE: 14 BRPM | DIASTOLIC BLOOD PRESSURE: 95 MMHG

## 2025-06-04 DIAGNOSIS — H61.301: Primary | ICD-10-CM

## 2025-06-04 LAB
GLUCOSE BLDC GLUCOMTR-MCNC: 113 MG/DL (ref 70–99)
GLUCOSE BLDC GLUCOMTR-MCNC: 95 MG/DL (ref 70–99)

## 2025-06-04 RX ORDER — OXYCODONE HYDROCHLORIDE 5 MG/1
5 TABLET ORAL
Status: COMPLETED | OUTPATIENT
Start: 2025-06-04 | End: 2025-06-04

## 2025-06-04 RX ORDER — ACETAMINOPHEN 325 MG/1
975 TABLET ORAL ONCE
Status: DISCONTINUED | OUTPATIENT
Start: 2025-06-04 | End: 2025-06-05 | Stop reason: HOSPADM

## 2025-06-04 RX ORDER — OXYCODONE HYDROCHLORIDE 5 MG/1
5 TABLET ORAL EVERY 6 HOURS PRN
Qty: 12 TABLET | Refills: 0 | Status: SHIPPED | OUTPATIENT
Start: 2025-06-04 | End: 2025-06-07

## 2025-06-04 RX ORDER — DEXAMETHASONE SODIUM PHOSPHATE 10 MG/ML
4 INJECTION, SOLUTION INTRAMUSCULAR; INTRAVENOUS
Status: DISCONTINUED | OUTPATIENT
Start: 2025-06-04 | End: 2025-06-05 | Stop reason: HOSPADM

## 2025-06-04 RX ORDER — DOCUSATE SODIUM 100 MG/1
100 CAPSULE, LIQUID FILLED ORAL 2 TIMES DAILY
Qty: 12 CAPSULE | Refills: 0 | Status: SHIPPED | OUTPATIENT
Start: 2025-06-04

## 2025-06-04 RX ORDER — LIDOCAINE HYDROCHLORIDE 20 MG/ML
INJECTION, SOLUTION INFILTRATION; PERINEURAL PRN
Status: DISCONTINUED | OUTPATIENT
Start: 2025-06-04 | End: 2025-06-04

## 2025-06-04 RX ORDER — ONDANSETRON 2 MG/ML
4 INJECTION INTRAMUSCULAR; INTRAVENOUS EVERY 30 MIN PRN
Status: DISCONTINUED | OUTPATIENT
Start: 2025-06-04 | End: 2025-06-05 | Stop reason: HOSPADM

## 2025-06-04 RX ORDER — SODIUM CHLORIDE, SODIUM LACTATE, POTASSIUM CHLORIDE, CALCIUM CHLORIDE 600; 310; 30; 20 MG/100ML; MG/100ML; MG/100ML; MG/100ML
INJECTION, SOLUTION INTRAVENOUS CONTINUOUS
Status: DISCONTINUED | OUTPATIENT
Start: 2025-06-04 | End: 2025-06-05 | Stop reason: HOSPADM

## 2025-06-04 RX ORDER — FENTANYL CITRATE 50 UG/ML
INJECTION, SOLUTION INTRAMUSCULAR; INTRAVENOUS PRN
Status: DISCONTINUED | OUTPATIENT
Start: 2025-06-04 | End: 2025-06-04

## 2025-06-04 RX ORDER — LIDOCAINE HYDROCHLORIDE AND EPINEPHRINE 10; 10 MG/ML; UG/ML
INJECTION, SOLUTION INFILTRATION; PERINEURAL PRN
Status: DISCONTINUED | OUTPATIENT
Start: 2025-06-04 | End: 2025-06-04 | Stop reason: HOSPADM

## 2025-06-04 RX ORDER — ONDANSETRON 4 MG/1
4 TABLET, ORALLY DISINTEGRATING ORAL EVERY 30 MIN PRN
Status: DISCONTINUED | OUTPATIENT
Start: 2025-06-04 | End: 2025-06-05 | Stop reason: HOSPADM

## 2025-06-04 RX ORDER — PROPOFOL 10 MG/ML
INJECTION, EMULSION INTRAVENOUS PRN
Status: DISCONTINUED | OUTPATIENT
Start: 2025-06-04 | End: 2025-06-04

## 2025-06-04 RX ORDER — HYDRALAZINE HYDROCHLORIDE 20 MG/ML
2.5-5 INJECTION INTRAMUSCULAR; INTRAVENOUS EVERY 10 MIN PRN
Status: DISCONTINUED | OUTPATIENT
Start: 2025-06-04 | End: 2025-06-05 | Stop reason: HOSPADM

## 2025-06-04 RX ORDER — NALOXONE HYDROCHLORIDE 0.4 MG/ML
0.1 INJECTION, SOLUTION INTRAMUSCULAR; INTRAVENOUS; SUBCUTANEOUS
Status: DISCONTINUED | OUTPATIENT
Start: 2025-06-04 | End: 2025-06-05 | Stop reason: HOSPADM

## 2025-06-04 RX ORDER — LABETALOL HYDROCHLORIDE 5 MG/ML
10 INJECTION, SOLUTION INTRAVENOUS
Status: DISCONTINUED | OUTPATIENT
Start: 2025-06-04 | End: 2025-06-05 | Stop reason: HOSPADM

## 2025-06-04 RX ORDER — ONDANSETRON 4 MG/1
4 TABLET, FILM COATED ORAL EVERY 6 HOURS PRN
Qty: 12 TABLET | Refills: 0 | Status: SHIPPED | OUTPATIENT
Start: 2025-06-04

## 2025-06-04 RX ORDER — HYDROMORPHONE HYDROCHLORIDE 1 MG/ML
0.4 INJECTION, SOLUTION INTRAMUSCULAR; INTRAVENOUS; SUBCUTANEOUS EVERY 5 MIN PRN
Status: DISCONTINUED | OUTPATIENT
Start: 2025-06-04 | End: 2025-06-05 | Stop reason: HOSPADM

## 2025-06-04 RX ORDER — ONDANSETRON 2 MG/ML
INJECTION INTRAMUSCULAR; INTRAVENOUS PRN
Status: DISCONTINUED | OUTPATIENT
Start: 2025-06-04 | End: 2025-06-04

## 2025-06-04 RX ORDER — GLYCOPYRROLATE 0.2 MG/ML
INJECTION, SOLUTION INTRAMUSCULAR; INTRAVENOUS PRN
Status: DISCONTINUED | OUTPATIENT
Start: 2025-06-04 | End: 2025-06-04

## 2025-06-04 RX ORDER — LIDOCAINE 40 MG/G
CREAM TOPICAL
Status: DISCONTINUED | OUTPATIENT
Start: 2025-06-04 | End: 2025-06-05 | Stop reason: HOSPADM

## 2025-06-04 RX ORDER — FENTANYL CITRATE 50 UG/ML
50 INJECTION, SOLUTION INTRAMUSCULAR; INTRAVENOUS EVERY 5 MIN PRN
Status: DISCONTINUED | OUTPATIENT
Start: 2025-06-04 | End: 2025-06-05 | Stop reason: HOSPADM

## 2025-06-04 RX ORDER — DIPHENHYDRAMINE HCL 12.5 MG/5ML
12.5 SOLUTION ORAL EVERY 6 HOURS PRN
Status: DISCONTINUED | OUTPATIENT
Start: 2025-06-04 | End: 2025-06-05 | Stop reason: HOSPADM

## 2025-06-04 RX ORDER — FENTANYL CITRATE 50 UG/ML
25 INJECTION, SOLUTION INTRAMUSCULAR; INTRAVENOUS
Status: DISCONTINUED | OUTPATIENT
Start: 2025-06-04 | End: 2025-06-05 | Stop reason: HOSPADM

## 2025-06-04 RX ORDER — ALBUTEROL SULFATE 0.83 MG/ML
2.5 SOLUTION RESPIRATORY (INHALATION) EVERY 4 HOURS PRN
Status: DISCONTINUED | OUTPATIENT
Start: 2025-06-04 | End: 2025-06-05 | Stop reason: HOSPADM

## 2025-06-04 RX ORDER — KETOROLAC TROMETHAMINE 30 MG/ML
15 INJECTION, SOLUTION INTRAMUSCULAR; INTRAVENOUS
Status: DISCONTINUED | OUTPATIENT
Start: 2025-06-04 | End: 2025-06-05 | Stop reason: HOSPADM

## 2025-06-04 RX ORDER — HYDROMORPHONE HYDROCHLORIDE 1 MG/ML
0.2 INJECTION, SOLUTION INTRAMUSCULAR; INTRAVENOUS; SUBCUTANEOUS EVERY 5 MIN PRN
Status: DISCONTINUED | OUTPATIENT
Start: 2025-06-04 | End: 2025-06-05 | Stop reason: HOSPADM

## 2025-06-04 RX ORDER — CEFAZOLIN SODIUM 2 G/50ML
2 SOLUTION INTRAVENOUS SEE ADMIN INSTRUCTIONS
Status: DISCONTINUED | OUTPATIENT
Start: 2025-06-04 | End: 2025-06-05 | Stop reason: HOSPADM

## 2025-06-04 RX ORDER — DOXYCYCLINE 100 MG/1
100 CAPSULE ORAL 2 TIMES DAILY
Qty: 6 CAPSULE | Refills: 0 | Status: SHIPPED | OUTPATIENT
Start: 2025-06-04

## 2025-06-04 RX ORDER — METOCLOPRAMIDE HYDROCHLORIDE 5 MG/ML
5 INJECTION INTRAMUSCULAR; INTRAVENOUS ONCE
Status: COMPLETED | OUTPATIENT
Start: 2025-06-04 | End: 2025-06-04

## 2025-06-04 RX ORDER — PROPOFOL 10 MG/ML
INJECTION, EMULSION INTRAVENOUS CONTINUOUS PRN
Status: DISCONTINUED | OUTPATIENT
Start: 2025-06-04 | End: 2025-06-04

## 2025-06-04 RX ORDER — BACITRACIN ZINC 500 [USP'U]/G
OINTMENT TOPICAL 2 TIMES DAILY
Qty: 28 G | Refills: 1 | Status: SHIPPED | OUTPATIENT
Start: 2025-06-04

## 2025-06-04 RX ORDER — ACETAMINOPHEN 325 MG/1
975 TABLET ORAL ONCE
Status: COMPLETED | OUTPATIENT
Start: 2025-06-04 | End: 2025-06-04

## 2025-06-04 RX ORDER — FENTANYL CITRATE 50 UG/ML
25 INJECTION, SOLUTION INTRAMUSCULAR; INTRAVENOUS EVERY 5 MIN PRN
Status: DISCONTINUED | OUTPATIENT
Start: 2025-06-04 | End: 2025-06-05 | Stop reason: HOSPADM

## 2025-06-04 RX ORDER — DIPHENHYDRAMINE HYDROCHLORIDE 50 MG/ML
12.5 INJECTION, SOLUTION INTRAMUSCULAR; INTRAVENOUS EVERY 6 HOURS PRN
Status: DISCONTINUED | OUTPATIENT
Start: 2025-06-04 | End: 2025-06-05 | Stop reason: HOSPADM

## 2025-06-04 RX ORDER — CEFAZOLIN SODIUM 2 G/50ML
2 SOLUTION INTRAVENOUS
Status: COMPLETED | OUTPATIENT
Start: 2025-06-04 | End: 2025-06-04

## 2025-06-04 RX ORDER — MEPERIDINE HYDROCHLORIDE 25 MG/ML
12.5 INJECTION INTRAMUSCULAR; INTRAVENOUS; SUBCUTANEOUS EVERY 5 MIN PRN
Status: DISCONTINUED | OUTPATIENT
Start: 2025-06-04 | End: 2025-06-05 | Stop reason: HOSPADM

## 2025-06-04 RX ORDER — OXYCODONE HYDROCHLORIDE 5 MG/1
10 TABLET ORAL
Status: DISCONTINUED | OUTPATIENT
Start: 2025-06-04 | End: 2025-06-05 | Stop reason: HOSPADM

## 2025-06-04 RX ORDER — CITRIC ACID/SODIUM CITRATE 334-500MG
SOLUTION, ORAL ORAL PRN
Status: DISCONTINUED | OUTPATIENT
Start: 2025-06-04 | End: 2025-06-04

## 2025-06-04 RX ORDER — DEXAMETHASONE SODIUM PHOSPHATE 4 MG/ML
INJECTION, SOLUTION INTRA-ARTICULAR; INTRALESIONAL; INTRAMUSCULAR; INTRAVENOUS; SOFT TISSUE PRN
Status: DISCONTINUED | OUTPATIENT
Start: 2025-06-04 | End: 2025-06-04

## 2025-06-04 RX ADMIN — METOCLOPRAMIDE HYDROCHLORIDE 5 MG: 5 INJECTION INTRAMUSCULAR; INTRAVENOUS at 07:32

## 2025-06-04 RX ADMIN — Medication 30 ML: at 08:10

## 2025-06-04 RX ADMIN — FENTANYL CITRATE 25 MCG: 50 INJECTION, SOLUTION INTRAMUSCULAR; INTRAVENOUS at 08:10

## 2025-06-04 RX ADMIN — CEFAZOLIN SODIUM 2 G: 2 SOLUTION INTRAVENOUS at 08:10

## 2025-06-04 RX ADMIN — ACETAMINOPHEN 975 MG: 325 TABLET ORAL at 07:03

## 2025-06-04 RX ADMIN — Medication 0.5 MG: at 09:30

## 2025-06-04 RX ADMIN — OXYCODONE HYDROCHLORIDE 5 MG: 5 TABLET ORAL at 10:34

## 2025-06-04 RX ADMIN — GLYCOPYRROLATE 0.2 MG: 0.2 INJECTION, SOLUTION INTRAMUSCULAR; INTRAVENOUS at 08:32

## 2025-06-04 RX ADMIN — FENTANYL CITRATE 25 MCG: 50 INJECTION, SOLUTION INTRAMUSCULAR; INTRAVENOUS at 08:15

## 2025-06-04 RX ADMIN — ONDANSETRON 4 MG: 2 INJECTION INTRAMUSCULAR; INTRAVENOUS at 10:00

## 2025-06-04 RX ADMIN — LIDOCAINE HYDROCHLORIDE 80 MG: 20 INJECTION, SOLUTION INFILTRATION; PERINEURAL at 08:22

## 2025-06-04 RX ADMIN — PROPOFOL 50 MG: 10 INJECTION, EMULSION INTRAVENOUS at 08:50

## 2025-06-04 RX ADMIN — PROPOFOL 150 MG: 10 INJECTION, EMULSION INTRAVENOUS at 08:22

## 2025-06-04 RX ADMIN — DEXAMETHASONE SODIUM PHOSPHATE 4 MG: 4 INJECTION, SOLUTION INTRA-ARTICULAR; INTRALESIONAL; INTRAMUSCULAR; INTRAVENOUS; SOFT TISSUE at 08:10

## 2025-06-04 RX ADMIN — PROPOFOL 150 MCG/KG/MIN: 10 INJECTION, EMULSION INTRAVENOUS at 08:25

## 2025-06-04 RX ADMIN — SODIUM CHLORIDE, SODIUM LACTATE, POTASSIUM CHLORIDE, CALCIUM CHLORIDE: 600; 310; 30; 20 INJECTION, SOLUTION INTRAVENOUS at 07:31

## 2025-06-04 NOTE — PROGRESS NOTES
PLASTIC SURGERY OPERATIVE REPORT     Date of Surgery: 6/4/2025  Surgical Service: Plastic Surgery     Preoperative Diagnosis:   1.  Right constricted ear deformity  2.  Status post right otoplasty x 3     Postoperative Diagnosis: Same as preoperative diagnoses     Procedures Performed: Right revision functional otoplasty  Attending:  MADY Agudelo MD, PhD, FACS  Assistant: None     Complications: None apparent  Specimens: None  Implants: None  Estimated blood loss: < 5 mL  Drains: None  Wound classification: Clean  Anesthesia: MAC with local     Indications for Procedure: 74-year-old female with right constricted ear deformity status post several surgeries to the right ear presenting with collapse of the superior helix.  After discussing options, patient elects to undergo revision otoplasty.  We did discuss utilization of cartilage graft from either ear to help build up with the superior helix to add support.  Patient is agreeable with the plan.    Discussed the risks of surgery, including but not limited to: infection, bleeding, pain, poor scarring, wound healing issues, need for revision or additional surgery, persistent constricted ear deformity, persistent inability to fully seat the hearing aid or glasses over the right ear, anesthesia-related complication, hematoma, suboptimal aesthetic result, asymmetries, DVT/PE, death. Despite these risks, patient consents to RIGHT revision functional otoplasty with use of cartilage graft from the contralateral ear. All questions answered.      Intraoperative findings: Several areas of cartilage fracture along the junction between the superior michelle of the antihelix and the helical rim.  These were repaired with buried interrupted figure-of-eight sutures to help imbricate flex the superior helix posteriorly.  Cartilage was divided along the superior helix to enable this maneuver.  Consul bowl cartilage was taken from the ipsilateral ear and placed both as an interposition as  well as an onlay.  Improved prominence of the right superior helix following otoplasty.     Description of Procedure: Patient was seen in the preoperative holding area.  Consent was verified.  The right ear was marked.  All additional questions were answered.  Discussed the risks of surgery, including but not limited to: infection, bleeding, pain, poor scarring, wound healing issues, need for revision or additional surgery, persistent constricted ear deformity, persistent inability to fully seat the hearing aid or glasses over the right ear, anesthesia-related complication, hematoma, suboptimal aesthetic result, asymmetries, DVT/PE, death. Despite these risks, patient consents to RIGHT revision functional otoplasty with use of cartilage graft from the contralateral ear. All questions answered.  Following discussion of all these risks, the patient again agreed to proceed with surgery.  Patient was then transferred to the operating room and placed supine on the operating table.  All pressure points were padded.  Sequential compression devices were placed on bilateral lower extremities and verified to be operational.  IV antibiotic prophylaxis was given.  Preinduction timeout was performed.  General anesthesia care was commenced.  The planned incision around the ear was infiltrated subdermally with local anesthesia.  The right ear and the side of the face was prepped and draped in usual sterile fashion.  Timeout was performed.  Next, after checking adequate anesthesia, an incision was made along the posterior aspect of the right ear utilizing the prior scar.  Incision was then extended along the lateral aspect of the helix to obtain access to the conchal bowl cartilage.  Additionally, the incision was extended medially along the root of the helix with a small backcut to help recruit skin.  At this point, the skin was elevated gently over the cartilage of the helical rim using iris scissors, with care taken to keep the  skin of the ear thick.  It was noted that there was fractured cartilage and several areas of the antihelix and the junction between the superior michelle and the helical rim.  The helical rim was divided with a #15 blade at 2 positions to enable outward flexing of the constricted upper third of the ear.  The fractured cartilage was repaired using combination of interrupted simple and figure-of-eight 5-0 PDS sutures, with slight imbrication as they were placed along the posterior aspect of the ear, in part to help outwardly flex the superior third of the ear.  Once this maneuver was done, there was a small gap in the superior helix deficient of cartilage.  The decision was made to then harvest a small strip of conchal bowl cartilage from the ipsilateral ear.  This was done with a #15 blade.  The cartilage graft was then divided and part of it was utilized to reinforce the repair of the fractured cartilage along the superior helix, to provide an interposition graft along the superior helical margin, as well as to provide a superior helical onlay graft.  The grafts were secured using 5-0 PDS and 5-0 Vicryl suture suture.  Additional skin undermining was done to elevate the skin to allow it to redraped over the superior helix.  Once done, the skin was redraped to assess for improvement in the constricted ear deformity.  Of note, the cartilage construct in the superior helix was more firm and stable with increased prominence over the superior aspect.  The skin was then redraped over the superior helix.  The posterior and superior scalp skin was then elevated and draped down and secured to the base of the root of the ear using 4-0 Vicryl suture.  The skin that was elevated and redraped over the superior helix was then draped over and secured to the skin of the posterior scalp using interrupted 4-0 Vicryl and 4-0 Monocryl suture in the deep dermis.  The skin was then closed using 5-0 Prolene and plain gut suture.  Once done,  the construct stability was reassessed and found to be stable and solid.  The incision was dressed with bacitracin, Xeroform.  The posterior helical sulcus was bolstered gently with Xeroform.  Photography was taken to confirm that there was a new sulcus with the ledge of the superior helix that should be able to hold the hearing aid or glasses.  Patient tolerated the procedure with no issues and was transferred to the postanesthesia care unit with no events.     Postoperative plan: Clinic in 1 week for incision check and suture removal.  Antibiotic ointment to the incision twice daily for 5 to 7 days.  No pressure over the right ear for the first 4 weeks.     Juan Agudelo MD, PhD, FACS

## 2025-06-04 NOTE — ANESTHESIA CARE TRANSFER NOTE
Patient: Nadira Perez    Procedure: Procedure(s):  Right revision functional otoplasty       Diagnosis: Constricted ear, right [H61.301]  Diagnosis Additional Information: No value filed.    Anesthesia Type:   General     Note:    Oropharynx: oropharynx clear of all foreign objects and spontaneously breathing  Level of Consciousness: drowsy  Oxygen Supplementation: face mask  Level of Supplemental Oxygen (L/min / FiO2): 6  Independent Airway: airway patency satisfactory and stable  Dentition: dentition unchanged  Vital Signs Stable: post-procedure vital signs reviewed and stable  Report to RN Given: handoff report given  Patient transferred to: PACU  Comments: Resps easy and reg. Report to PACU RN   Handoff Report: Identifed the Patient, Identified the Reponsible Provider, Reviewed the pertinent medical history, Discussed the surgical course, Reviewed Intra-OP anesthesia mangement and issues during anesthesia, Set expectations for post-procedure period and Allowed opportunity for questions and acknowledgement of understanding      Vitals:  Vitals Value Taken Time   /75 06/04/25 10:22   Temp 36.9  C (98.42  F) 06/04/25 10:28   Pulse 84 06/04/25 10:28   Resp 13 06/04/25 10:28   SpO2 97 % 06/04/25 10:28   Vitals shown include unfiled device data.    Electronically Signed By: MERCEDES Wilson CRNA  June 4, 2025  10:29 AM

## 2025-06-04 NOTE — DISCHARGE INSTRUCTIONS
Plastic Surgery Discharge Instructions    Patient has been treated for right constricted ear deformity with Dr. Agudelo on 6/4/2025.     Care: Please keep the dressing in place, clean and dry.  You can remove the dry gauze and the yellow dressing after 36 hours.  Please apply antibiotic ointment twice daily to the incision.  No pressure on the right ear.    Medications: You can take Ibuprofen 400-800 mg and Tylenol 650 mg for pain relief. Please take each every 6 hours, and for optimal pain relief - please stagger the medications so that you are taking one or the other every 3 hours. If you had a nerve block, the effects may last 8-12 hours. If you have been prescribed additional pain medications, please take as instructed and as needed. If you are taking additional pain medications, please do not exceed 4000 mg of Tylenol daily from all sources. Also, if you are taking narcotic medications, please do not operate heavy machinery or drive. If you have been prescribed antibiotics, please also take as instructed.     Diet: Start with clear liquids and slow down if nauseated. Advance the diet as tolerated.    Weight-bearing/Activities: No strenuous activities and do not raise your heart rate above 100 bpm for the first few weeks.  Sleep with the head of bed elevated.  No pressure on the right ear.    ER Instructions: Please call the office and/or consider return to the ER if you experience worsening pain not relieved by medications, increased swelling, redness or high fevers >101F or if there are unexpected problems like shortness of breath.    Post-op follow-up: Clinic in 1 week for incision check with Dr. Agudelo or his PA at the [facility]    Juan Agudelo MD, PhD, [facility]  Home Care Following Anesthesia  For 24 hours after surgery:  Get plenty of rest.  A responsible adult must stay with you for at least 24 hours after you leave the surgery center.  Do  not drive or use heavy equipment.  If you have weakness or tingling, don't drive or use heavy equipment until this feeling goes away.   Do not drink alcohol.   Avoid strenuous or risky activities.  Ask for help when climbing stairs.  You may feel lightheaded.  IF so, sit for a few minutes before standing.  Have someone help you get up.   If you have nausea (feel sick to your stomach): Drink only clear liquids such as apple juice, ginger ale, broth or 7-Up.  Rest may also help.  Be sure to drink enough fluids.  Move to a regular diet as you feel able.   You may have a slight fever.  Call the doctor if your fever is over 100 F (37.7 C) (taken under the tongue) or lasts longer than 24 hours.  You may have a dry mouth, a sore throat, muscle aches or trouble sleeping. These should go away after 24 hours.  Do not make important or legal decisions.   It is recommended to avoid smoking.               Tips for taking pain medications  To get the best pain relief possible, remember these points:  Take pain medications as directed, before pain becomes severe.  Pain medication can upset your stomach: taking it with food may help.  Constipation is a common side effect of pain medication. Drink plenty of  fluids.  Eat foods high in fiber. Take a stool softener if recommended by your doctor or pharmacist.  Do not drink alcohol, drive or operate machinery while taking pain medications.  Ask about other ways to control pain, such as with heat, ice or relaxation.    Tylenol/Acetaminophen Consumption    If you feel your pain relief is insufficient, you may take Tylenol/Acetaminophen in addition to your narcotic pain medication.   Be careful not to exceed 4,000 mg of Tylenol/Acetaminophen in a 24 hour period from all sources.  If you are taking extra strength Tylenol/acetaminophen (500 mg), the maximum dose is 8 tablets in 24 hours.  If you are taking regular strength acetaminophen (325 mg), the maximum dose is 12 tablets in 24  hours.  975 mg of Tylenol given at 7:00 am next dose maybe given after 1:00 pm    Call a doctor for any of the following:  Signs of infection (fever, growing tenderness at the surgery site, a large amount of drainage or bleeding, severe pain, foul-smelling drainage, redness, swelling).  It has been over 8 to 10 hours since surgery and you are still not able to urinate (pass water).  Headache for over 24 hours.  Signs of Covid-19 infection (temperature over 100 degrees, shortness of breath, cough, loss of taste/smell, generalized body aches, persistent headache, chills, sore throat, nausea/vomiting/diarrhea)    Your doctor is:       Dr. Juan Agudelo, Plastic Surgery: [phone]               After hours and weekends call the hospital @ [phone] and ask for the resident on call for:  Plastics  For emergency care, call the:  [facility] Emergency Department: [phone] (TTY for hearing impaired: [phone])

## 2025-06-04 NOTE — ANESTHESIA POSTPROCEDURE EVALUATION
Patient: Nadira Perez    Procedure: Procedure(s):  Right revision functional otoplasty       Anesthesia Type:  General    Note:  Disposition: Outpatient   Postop Pain Control: Uneventful            Sign Out: Well controlled pain   PONV: No   Neuro/Psych: Uneventful            Sign Out: Acceptable/Baseline neuro status   Airway/Respiratory: Uneventful            Sign Out: Acceptable/Baseline resp. status   CV/Hemodynamics: Uneventful            Sign Out: Acceptable CV status; No obvious hypovolemia; No obvious fluid overload   Other NRE: NONE   DID A NON-ROUTINE EVENT OCCUR? No       Last vitals:  Vitals Value Taken Time   /88 06/04/25 10:40   Temp 36.6  C (97.9  F) 06/04/25 10:40   Pulse 80 06/04/25 10:40   Resp 13 06/04/25 10:40   SpO2 95 % 06/04/25 10:40       Electronically Signed By: Bee Neil MD  June 4, 2025  11:24 AM

## 2025-06-04 NOTE — ANESTHESIA PROCEDURE NOTES
Airway       Patient location during procedure: OR  Staff -        CRNA: Lotus Lancaster APRN CRNA       Performed By: CRNA  Consent for Airway        Urgency: elective  Indications and Patient Condition       Indications for airway management: harriett-procedural       Induction type:intravenous       Mask difficulty assessment: 0 - not attempted    Final Airway Details       Final airway type: supraglottic airway    Supraglottic Airway Details        Type: LMA       Brand: LMA Unique       LMA size: 4    Post intubation assessment        Placement verified by: capnometry, equal breath sounds and chest rise        Number of attempts at approach: 2 (1st attempt with Uniue)       Secured with: tape       Ease of procedure: easy       Dentition: Intact and Unchanged

## 2025-06-04 NOTE — PROGRESS NOTES
PRS    No changes in history or examination.  Medically optimized.    OR today for right revision functional otoplasty with use of cartilage graft.  After discussion, we can probably take a small amount of cartilage from the ipsilateral ear as a cartilage graft.  This will negate the need to take cartilage from the contralateral ear.  We will leave this on the consent, however, in case we need additional cartilage graft.  Patient is agreeable to plan.    Discussed the risks of surgery, including but not limited to: infection, bleeding, pain, poor scarring, wound healing issues, need for revision or additional surgery, persistent constricted ear deformity, persistent inability to fully seat the hearing aid or glasses over the right ear, anesthesia-related complication, hematoma, suboptimal aesthetic result, asymmetries, DVT/PE, death. Despite these risks, patient consents to RIGHT revision functional otoplasty with use of cartilage graft from the contralateral ear. All questions answered.     Juan Agudelo MD, PhD, FACS

## 2025-06-05 ENCOUNTER — DOCUMENTATION ONLY (OUTPATIENT)
Dept: INTERNAL MEDICINE | Facility: CLINIC | Age: 75
End: 2025-06-05
Payer: COMMERCIAL

## 2025-06-05 NOTE — PROGRESS NOTES
Type of Form Received: [employer] [id number], HHC/POC 5/15-7/13 [id number]    Form Received (Date) 6/3/25   Form Filled out No   Placed in provider folder Yes

## 2025-06-09 ENCOUNTER — DOCUMENTATION ONLY (OUTPATIENT)
Dept: INTERNAL MEDICINE | Facility: CLINIC | Age: 75
End: 2025-06-09
Payer: COMMERCIAL

## 2025-06-09 NOTE — PROGRESS NOTES
Type of Form Received: [employer] [id number]    Form Received (Date) 6/6/25   Form Filled out No   Placed in provider folder Yes

## 2025-06-10 ENCOUNTER — TELEPHONE (OUTPATIENT)
Dept: INTERNAL MEDICINE | Facility: CLINIC | Age: 75
End: 2025-06-10

## 2025-06-10 ENCOUNTER — ANCILLARY PROCEDURE (OUTPATIENT)
Dept: GENERAL RADIOLOGY | Facility: CLINIC | Age: 75
End: 2025-06-10
Attending: ORTHOPAEDIC SURGERY
Payer: COMMERCIAL

## 2025-06-10 ENCOUNTER — OFFICE VISIT (OUTPATIENT)
Dept: ORTHOPEDICS | Facility: CLINIC | Age: 75
End: 2025-06-10
Payer: COMMERCIAL

## 2025-06-10 DIAGNOSIS — M48.062 SPINAL STENOSIS OF LUMBAR REGION WITH NEUROGENIC CLAUDICATION: Primary | ICD-10-CM

## 2025-06-10 DIAGNOSIS — M53.3 SACROILIAC JOINT PAIN: ICD-10-CM

## 2025-06-10 DIAGNOSIS — Z98.1 S/P SPINAL FUSION: ICD-10-CM

## 2025-06-10 DIAGNOSIS — M48.062 SPINAL STENOSIS OF LUMBAR REGION WITH NEUROGENIC CLAUDICATION: ICD-10-CM

## 2025-06-10 PROCEDURE — 72110 X-RAY EXAM L-2 SPINE 4/>VWS: CPT | Performed by: STUDENT IN AN ORGANIZED HEALTH CARE EDUCATION/TRAINING PROGRAM

## 2025-06-10 RX ORDER — TRIAMCINOLONE ACETONIDE 40 MG/ML
40 INJECTION, SUSPENSION INTRA-ARTICULAR; INTRAMUSCULAR
Status: COMPLETED | OUTPATIENT
Start: 2025-06-10 | End: 2025-06-10

## 2025-06-10 RX ORDER — LIDOCAINE HYDROCHLORIDE 10 MG/ML
10 INJECTION, SOLUTION EPIDURAL; INFILTRATION; INTRACAUDAL; PERINEURAL
Status: COMPLETED | OUTPATIENT
Start: 2025-06-10 | End: 2025-06-10

## 2025-06-10 RX ADMIN — LIDOCAINE HYDROCHLORIDE 10 ML: 10 INJECTION, SOLUTION EPIDURAL; INFILTRATION; INTRACAUDAL; PERINEURAL at 15:35

## 2025-06-10 RX ADMIN — TRIAMCINOLONE ACETONIDE 40 MG: 40 INJECTION, SUSPENSION INTRA-ARTICULAR; INTRAMUSCULAR at 15:35

## 2025-06-10 NOTE — LETTER
6/10/2025      Nadira Perez  [address]  [facility] [address]      Dear Colleague,    Thank you for referring your patient, Nadira Perez, to the [facility] [place]. Please see a copy of my visit note below.    Spine Surgery Return Clinic Visit      Chief Complaint:   Consult (Low back )      Interval HPI:  Symptom Profile Including: location of symptoms, onset, severity, exacerbating/alleviating factors, previous treatments:        Nadira Perez is a 74 year old female who presents today for evaluation of right SI joint dysfunction as well as low back and right gluteal pains.  She has a history of a previous L5-S1 interbody fusion which was done for severe stenosis due to some ossified disc materials, and she did well with that for a number of years.  More recently she has been developing worsening back pain difficulty standing and walking.  She feels like the worst pain is over her right posterior superior iliac spine and she points specifically to that area and it goes down into the gluteal region.  Sometimes it travels down into the legs but not as much is in the back in that area.  She has had 2 previous SI injections with Dr. Saldivar.  The first 1 provided basically complete relief for a couple of months and then it wore off.  She then had a second fluoroscopically guided SI injection in February 2025 and this provided some very short-term relief but did not last as long so she is now referred to me to see if she would be a candidate for minimally invasive SI joint fusion.    She has done physical therapy in the past and has also taken a number of oral medications including anti-inflammatories and oxycodone.  She is worked hard to try to minimize her narcotic use.            Past Medical History:     Past Medical History:   Diagnosis Date     Anemia Off and on from years back     Arthritis      Bone disease      Breast cancer (H)      Cataract      Chest  pain Since 2/12/24 sternal fracture (secondary to fall), pain level started high, then decreased to moderate two weeks later; then to mild to none since then.  Sneezing and coughing bring it up again to a moderate level.     Chronic osteoarthritis Many years     Diabetes (H)      Difficult intubation     Use Peds #5 due to throat kyphoplasty     Gastroesophageal reflux disease      H/O kyphoplasty      Hearing problem      Hepatitis 1975    Monohepatitis     History of blood transfusion      History of kidney stones      History of radiation therapy      Hyperlipemia      Hyperlipidemia Recent concern with elevated results (been on statins for years).     Hypertension      Hypopotassemia      Irregular heart beat      Kidney problem      Lymph edema      Medullary sponge kidney      Motion sickness     Balance issues     Osteopenia      PONV (postoperative nausea and vomiting)      Reduced vision      Scoliosis Sceondary to advanced osteoporosis.     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     Thyroid disease             Past Surgical History:     Past Surgical History:   Procedure Laterality Date     ABDOMEN SURGERY      ovarian cyst, mesh     ARTHRODESIS WRIST Right      ARTHRODESIS WRIST  02/14/2013    Procedure: ARTHRODESIS WRIST;  left wrist scaphoid excision, four bone fusion, iliac crest bone graft  ( Mac with block);  Surgeon: Av Mendez MD;  [facility]     BIOPSY      skin, vaginal     BLEPHAROPLASTY BILATERAL Bilateral 05/06/2022    Procedure: UPPER BLEPHAROPLASTY, BILATERAL;  Surgeon: Jemal Sanchez MD;  [place] [facility]     CATARACT IOL, RT/LT Right 03/13/2018     CATARACT IOL, RT/LT Left 02/20/2018     COLONOSCOPY  12/24/2013    Procedure: COMBINED COLONOSCOPY, SINGLE BIOPSY/POLYPECTOMY BY BIOPSY;   COLONOSCOPY;  Surgeon: Dom Alvarez MD;  Location:  GI     COLONOSCOPY N/A 03/12/2024    Procedure: COLONOSCOPY, FLEXIBLE, WITH LESION REMOVAL USING SNARE;  Surgeon: Amina Espinoza MD;  Location:  GI     COSMETIC BLEPHAROPLASTY LOWER LIDS BILATERAL Bilateral 05/06/2022    Procedure: BLEPHAROPLASTY, LOWER EYELID, BILATERAL, COSMETIC;  Surgeon: Jemal Sanchez MD;  [place] [facility] [place]     COSMETIC SURGERY       ESOPHAGOSCOPY, GASTROSCOPY, DUODENOSCOPY (EGD), COMBINED N/A 11/23/2016    Procedure: COMBINED ESOPHAGOSCOPY, GASTROSCOPY, DUODENOSCOPY (EGD);  Surgeon: Quinten Feliciano MD;  Location:  GI     ESOPHAGOSCOPY, GASTROSCOPY, DUODENOSCOPY (EGD), COMBINED N/A 03/12/2024    Procedure: ESOPHAGOGASTRODUODENOSCOPY, WITH BIOPSY;  Surgeon: Amina Espinoza MD;  Location: [facility]     EXTERNAL EAR SURGERY      right     EYE SURGERY      radial keratomy     FUSION, SPINE, INTERBODY, OBLIQUE ANTERIOR AND LUMBAR, 2 LEVELS, POST APPROACH, USING OTS N/A 11/18/2021    Procedure: Part 1: Oblique Anterior Interbody Fusion at Lumbar 5 to sacral 1 with use of Bone Morphogenic Protein,;  Surgeon: Serge Ramirez MD;  Location: [facility]     GI SURGERY      Umbilical hernia repair     GRAFT BONE FROM ILIAC CREST  02/14/2013    Procedure: GRAFT BONE FROM ILIAC CREST;  mac with block and local infilitration;  Surgeon: Av Mendez MD;  Location:  OR      BREATH HYDROGEN TEST N/A 10/14/2016    Procedure: HYDROGEN BREATH TEST;  Surgeon: Cheri Barron MD;  Location:  GI     HERNIA REPAIR      umbilical age 18 mos.     HYSTERECTOMY       HYSTERECTOMY TOTAL ABDOMINAL  05/03/2000     INJECT EPIDURAL CAUDAL N/A 09/12/2023    Procedure: Caudal epidural steroid injection with fluoroscopy;  Surgeon: Natasha Umaña MD;  Location: [facility] OR     INJECT EPIDURAL CAUDAL N/A 01/02/2024    Procedure: INJECTION, EPIDURAL, CAUDAL;  Surgeon: Natasha Umaña MD;  Location: [facility] OR     INJECT EPIDURAL LUMBAR N/A  05/23/2023    Procedure: L3-4 interlaminar epidural steroid injection with fluoroscopy ( left> right);  Surgeon: Natasha Umaña MD;  Location: [facility] OR     INJECT JOINT SACROILIAC Left 04/27/2023    Procedure: Left sacroiliac joint steroid injection with fluoroscopy;  Surgeon: Natasha Umaña MD;  Location: [facility] OR     INJECT JOINT SACROILIAC Bilateral 10/02/2024    Procedure: Bilateral sacroiliac joint injection;  Surgeon: Thais Saldivar MD;  Location: [facility] OR     INJECT SACROILIAC JOINT Bilateral 02/26/2025    Procedure: Bilateral sacroiliac joint steroid injections;  Surgeon: Thais Saldivar MD;  Location: [facility] OR     LAPAROSCOPY DIAGNOSTIC (GENERAL) N/A 05/04/2025    Procedure: LAPAROSCOPY, DIAGNOSTIC with lysis of adhesions, converted to;  Surgeon: Jacobo Cintron DO;  Location: [facility]     MASTECTOMY MODIFIED RADICAL Bilateral     bilateral; right breast prophylactic     OOPHOROPEXY       OPTICAL TRACKING SYSTEM FUSION POSTERIOR SPINE LUMBAR N/A 11/18/2021    Procedure: Open Posterior Instrumented Spinal Fusion at Lumbar 5 to Sacral 1, with grimm aguayo osteotomy, use of O-Arm/Stealth;  Surgeon: Serge Ramirez MD;  [facility]     OTOPLASTY Bilateral 07/25/2024    Procedure: Right Revision Functional Otoplasty;  Surgeon: Juan Agudelo MD;  Location: [facility] OR     OTOPLASTY Right 6/4/2025    Procedure: Right revision functional otoplasty;  Surgeon: Juan Agudelo MD;  Location: [facility] OR     PARATHYROIDECTOMY  09/23/2004    R SUPERIOR     PARATHYROIDECTOMY  09/23/2004    parathyroid resection, subtotal     PICC INSERTION - DOUBLE LUMEN Right 05/08/2025    38-1cm, Basilic vein     MI HAND/FINGER SURGERY UNLISTED  Bilateral partial wrist fusions [id number]     MI SPINE SURGERY PROCEDURE UNLISTED  December 2021     MI STOMACH SURGERY PROCEDURE UNLISTED  Several throughout the years     RELEASE CARPAL TUNNEL Right 12/02/2021    Procedure: RIGHT CARPAL TUNNEL RELEASE, RIGHT  WRIST HARDWARE REMOVAL, RIGHT CARPAL BOSS EXCISION;  Surgeon: Rolan Conley MD;  Location: [facility] OR     REMOVE HARDWARE HAND  09/24/2013    Procedure: REMOVE HARDWARE HAND;  Left Hand Screw Removal        RESECT SMALL BOWEL WITHOUT OSTOMY N/A 05/04/2025    Procedure: open laparotomy with small bowel resection x1;  Surgeon: Jacobo Cintron DO;  Location: [facility] OR     RHINOPLASTY  1968     thyr proc skin closed cosmetic manner by subcuticular stitch  01/23/2009     THYROPLASTY  10/09/2009     TONSILLECTOMY  1977     VITRECTOMY PARSPLANA WITH 25 GAUGE SYSTEM Left 06/20/2022    Procedure: LEFT EYE VITRECTOMY, PARS PLANA APPROACH, USING 25-GAUGE INSTRUMENTS, laser;  Surgeon: Felix Carlos MD;  Location: [facility] OR     WRIST SURGERY      wrist arthrodesis            Social History:     Social History     Tobacco Use     Smoking status: Never     Passive exposure: Never     Smokeless tobacco: Never   Substance Use Topics     Alcohol use: Yes     Alcohol/week: 7.0 standard drinks of alcohol     Types: 7 Glasses of wine per week     Comment: Rare if ever            Family History:     Family History   Problem Relation Age of Onset     Neurologic Disorder Mother         Anuerysm of Cerebral Artery, Dementia     Diabetes Mother      Thyroid Disease Mother         Hyperthyroidism (goiter)     Cerebrovascular Disease Mother         Hemorrhagic     Osteoporosis Mother      Hyperlipidemia Mother      Spine Problems Mother         Spinal stenosis     Aneurysm Mother      Heart Disease Father         AAA     Hypertension Father      Coronary Artery Disease Father      Hyperlipidemia Father      Mental Illness Father         Schizophrenia?     Alcoholism Father      Spine Problems Father         Chronic back pain     Circulatory Brother         Perihperal Neurophathy     Peripheral Neuropathy Brother      Peripheral Neuropathy Brother      Diabetes Maternal Grandmother         Presumed Type 2     Asthma Maternal  Grandmother      Obesity Maternal Grandmother      Early Death Maternal Grandmother      Chronic Obstructive Pulmonary Disease Maternal Grandfather         father     Asthma Maternal Grandfather      Diabetes Maternal Aunt         x2     Melanoma Maternal Aunt      Glaucoma Maternal Aunt      Breast Cancer Cousin      Breast Cancer Cousin      Cerebrovascular Disease Cousin      Obesity Other      Breast Cancer Other      Dementia Other         Maternal aunt     Cancer Other         malignant melanoma     Hypertension Other      Hypertension Other      Cerebrovascular Disease Other      Diabetes Other      Cerebrovascular Disease Other         Type unknown     Obesity Other      Respiratory Other      Cancer Other      Diabetes Other      Asthma Other      Other Cancer Other         Malignant melanoma     Obesity Other      Diabetes Other      Rheumatoid Arthritis Other      Diabetes Other      Hypertension Other      Cerebrovascular Disease Other         ,     Asthma Other      Obesity Other      Cancer Other      Breast Cancer Cousin      Arthritis Other      Obesity Other      Melanoma Other      Other Cancer Other         Malignant melanoma     Obesity Other      Rheumatoid Arthritis Other      Diabetes Other         Type 2     Hyperlipidemia Other      Diabetes Other         Type 2     Hyperlipidemia Other      Hypertension Brother      Hyperlipidemia Brother      Spine Problems Brother         Spinal stenosis     Cerebrovascular Disease Other      Hyperlipidemia Other      Breast Cancer Cousin         Metastasized in later years     Uterine Cancer Cousin      Ovarian Cancer Cousin      Uterine Cancer Cousin      Other Cancer Cousin         Ovarian     Obesity Cousin      Ovarian Cancer Cousin      Dementia Other      Kidney Disease Other      Hypertension Brother      Hyperlipidemia Brother      Other Cancer Cousin         Ovarian, Stage unknown     Obesity Cousin      Obesity Cousin      Obesity Other       Macular Degeneration No family hx of      Thrombosis No family hx of      Depression No family hx of      Substance Abuse No family hx of      Cystic Fibrosis No family hx of      Coronary Artery Disease Early Onset No family hx of      Heart Failure No family hx of      Bleeding Diathesis No family hx of      Prostate Cancer No family hx of      Colorectal Cancer No family hx of      Pancreatic Cancer No family hx of      Lung Cancer No family hx of      Autoimmune Disease No family hx of      Unknown/Adopted No family hx of      Genetic Disorder No family hx of      Bleeding Disorder No family hx of      Clotting Disorder No family hx of      Anesthesia Reaction No family hx of             Allergies:     Allergies   Allergen Reactions     Erythromycin Nausea     Penicillin G      Penicillins Hives     Around age 6 - doesn't recall full reaction, mother told her it was hives.     Has tolerated cephalsporins.             Medications:     Current Outpatient Medications   Medication Sig Dispense Refill     acetaminophen (TYLENOL) 500 MG tablet Take 1,000 mg by mouth 3 times daily       artificial tears OINT ophthalmic ointment Place into both eyes nightly as needed for dry eyes.       atorvastatin (LIPITOR) 80 MG tablet Take 1 tablet (80 mg) by mouth daily. 90 tablet 3     bacitracin 500 UNIT/GM external ointment Apply topically 2 times daily. Please apply to the right ear incision twice daily. 28 g 1     carvedilol (COREG) 12.5 MG tablet Take 1 tablet (12.5 mg) by mouth 2 times daily (with meals). 180 tablet 3     CRANBERRY EXTRACT PO Take 500 mg by mouth every morning       diclofenac (VOLTAREN) 1 % topical gel Apply 2 g topically 4 times daily 100 g 6     docusate sodium (COLACE) 100 MG capsule Take 1 capsule (100 mg) by mouth 2 times daily. 12 capsule 0     gabapentin (NEURONTIN) 300 MG capsule Take 4 capsules (1,200 mg) by mouth 3 times daily. 1080 capsule 3     HEMP OIL OR EXTRACT OR OTHER CBD CANNABINOID, NOT  MEDICAL CANNABIS, THC       hydrochlorothiazide (HYDRODIURIL) 25 MG tablet Take 1 tablet (25 mg) by mouth daily. 90 tablet 3     levothyroxine (SYNTHROID/LEVOTHROID) 112 MCG tablet TAKE 1/2 TAB BY MOUTH EVERY DAY 45 tablet 3     lisinopril (ZESTRIL) 20 MG tablet Take 1 tablet (20 mg) by mouth 2 times daily. 180 tablet 3     MAGNESIUM GLYCINATE PO Take 800 mg by mouth at bedtime.       Melatonin 10 MG TABS tablet Take 10 mg by mouth nightly as needed.       methocarbamol (ROBAXIN) 500 MG tablet Take 1 tablet (500 mg) by mouth every 8 hours as needed for muscle spasms. 30 tablet 0     Multiple Vitamin (MULTI-VITAMIN) per tablet Take 1 tablet by mouth every morning       naloxone (NARCAN) 4 MG/0.1ML nasal spray Spray 1 spray (4 mg) into one nostril alternating nostrils as needed for opioid reversal. every 2-3 minutes until assistance arrives 0.2 mL 0     omeprazole (PRILOSEC) 20 MG DR capsule Take 1 capsule (20 mg) by mouth daily. 90 capsule 3     ondansetron (ZOFRAN) 4 MG tablet Take 1 tablet (4 mg) by mouth every 6 hours as needed for nausea. 12 tablet 0     spironolactone (ALDACTONE) 25 MG tablet Take 1 tablet (25 mg) by mouth daily at 2 pm. (Patient taking differently: Take 50 mg by mouth daily at 2 pm.) 90 tablet 3     tiZANidine (ZANAFLEX) 4 MG tablet Take 1 tablet (4 mg) by mouth at bedtime. 90 tablet 3     Turmeric 500 MG CAPS Take 500 mg by mouth every morning       doxycycline hyclate (VIBRAMYCIN) 100 MG capsule Take 1 capsule (100 mg) by mouth 2 times daily. 6 capsule 0     ondansetron (ZOFRAN ODT) 4 MG ODT tab Take 1 tablet (4 mg) by mouth every 8 hours as needed for nausea. 90 tablet 0     ondansetron (ZOFRAN) 4 MG tablet Take 1 tablet (4 mg) by mouth every 6 hours as needed for nausea. 12 tablet 3     ondansetron (ZOFRAN-ODT) 4 MG ODT tab Take 1 tablet (4 mg) by mouth every 12 hours as needed for nausea 180 tablet 3     oxyCODONE (ROXICODONE) 5 MG tablet Take 1 tablet (5 mg) by mouth every 6 hours as  needed for severe pain. #90 tabs to last 30 days. May fill 5/27 90 tablet 0     No current facility-administered medications for this visit.             Review of Systems:   A focused musculoskeletal and neurologic ROS was performed with pertinent positives and negatives noted in the HPI.  Additional systems were also reviewed and are documented at the bottom of the note.         Physical Exam:   Vitals: LMP  (LMP Unknown)   Musculoskeletal, Neurologic, and Spine:          Lumbar Spine:    Appearance - No gross stepoffs or deformities    Motor -     L2-3: Hip flexion 5/5 R and 5/5 L strength          L3/4:  Knee extension R 4/5 and L 4/5 strength         L4/5:  Foot dorsiflexion R 1/5 L 1/5 and       EHL dorsiflexion R 1/5 L 4/5 strength         S1:  Plantarflexion/Peroneal Muscles  R 1/5 and L 1/5 strength    Sensation: intact to light touch L3-S1 distribution BLE    CHronic bilateral foot drop  in AFOS          I examined her right SI joint.  She has a positive thigh thrust lateral compression and anterior pelvic compression.  She is also tender over the right greater trochanter         Imaging:   We ordered and independently reviewed new radiographs at this clinic visit. The results were discussed with the patient. Findings include:     I reviewed standing lumbar radiographs which show a large magnitude lumbar scoliosis with severe degenerative change at the L3-4 and L4-5 levels above previous L5-S1 interbody fusion    I also reviewed CT scan from 2025 the lumbar spine which shows likely solid arthrodesis of the previous L5-S1 fusion with severe degenerative changes at adjacent levels as well as an old compression fracture near the thoracolumbar junction       Assessment and Plan:     74 year old female with some chronic lower extremity weakness wearing bilateral AFOs, degenerative scoliosis, osteoporosis and right SI joint dysfunction.    We had a nice discussion today.  I explained that I do not think surgery  is possible for her scoliosis given that she has osteoporosis I think there would be a high risk of nonunion and implant failure.    I also do not think any surgery is likely to change her lower extremity weakness which is longstanding and requires the bilateral AFOs.    If we were to do anything the main goal would be to try to alleviate some of the pain over her right SI joint.  She does have a positive Hang finger sign and a positive SI joint exam.  I explained that the surgery for this would be a minimally invasive SI joint fusion and I think there is about a 60% chance of success.  The reason I do not quote her a higher chance of success is because of her osteoporosis and scoliosis.  I think with her osteoporosis there is a chance the bones may not heal, and there is also a chance she could have continued referred pain from the upper areas.  She feels very disabled by this and she does feel that with the previous SI injection she really got quite a lot of relief and so that helped her to localize the problem to this area.  Given this she would like to proceed with an attempt at a minimally invasive SI joint fusion.  I explained the risk of incomplete symptom relief nonunion risk of infection risk of implant malposition and need for revision in the future and she expressed understanding.  I will move forward with getting that arranged for her.    Separate from this I did provide her a right trochanteric bursal injection to help with some of her lateral hip pain.  I also advised hip abduction exercises    Procedure:  The skin over the lateral aspect of the right greater trochanter was prepared with chlorhexidine.  I then injected a mixture of 10 cc of 1% lidocaine and 40 mg of Kenalog in a sterile fashion.  A Band-Aid was applied.  There were no immediately evident complications.    I personally performed the injection           Respectfully,  Serge Ramirez MD  Spine Surgery  [facility]  [place]      Again, thank you for allowing me to participate in the care of your patient.        Sincerely,        Serge Ramirez MD    Electronically signed

## 2025-06-10 NOTE — PROGRESS NOTES
Large Joint Injection/Arthocentesis: R greater trochanteric bursa    Date/Time: 6/10/2025 3:35 PM    Performed by: Serge Ramirez MD  Authorized by: Serge Ramirez MD    Indications:  Pain  Needle Size:  21 G  Guidance: landmark guided    Location:  Hip      Site:  R greater trochanteric bursa  Medications:  40 mg triamcinolone 40 MG/ML; 10 mL lidocaine (PF) 1 %  Outcome:  Tolerated well, no immediate complications  Procedure discussed: discussed risks, benefits, and alternatives    Prep: patient was prepped and draped in usual sterile fashion

## 2025-06-10 NOTE — TELEPHONE ENCOUNTER
LOV 05/29/2025      Called Becky and left a secure VM.  Gave verbal orders per Matilde LONG CNP for Order(s): Home Care Orders: Physical Therapy (PT): 1 time a week for 4 weeks       Alexandre Colon CMA ([facility]) at 11:00 AM on 6/10/2025

## 2025-06-10 NOTE — PROGRESS NOTES
"  RN provided packet for patient and went through SI checklist with patient.    Called patient to go over the following checklist for their upcoming Minimally Invasive SI Joint Fusion.     Tobacco Cessation    Patient is a nonsmoker. No further action needed from patient.      Conservative Management    Patient needs to complete 6 months of physician supervised conservative management of their sacroiliac pain. This includes:  Physical Therapy, specifically for low back and sacroiliac joint pain. Must be for at least 6 weeks and have completed it in the last year      Injections    Two anesthetic only injections: These are injections with local numbing medication only. They are meant to provide immediate pain relief and do not last more than a few hours.   - These injections are considered \"Diagnostic\".   - Insurance require that these injections provide at least 75% pain relief.   - Injections need to be at least 2 weeks apart    One therapeutic steroid injection: This is an injection with both local numbing medication AND a steroid medication.   - They are therapeutic and are meant to provide long lasting relief of SI pain. The length of pain relief varies from person to person.     CT guided Diagnostic SI Joint Injection on 10/02/2024, CT guided Diagnostic SI Joint Injection on 02/26/2025, and CT guided Therapeutic SI Joint Injection on 07/02/2025      Imaging (within the last two months)    CT or MRI of SI joint/pelvis/hip on 01/31/2025 and CT or MRI of Lumbar Spine on 01/31/2025          Micaela Pack RN    "

## 2025-06-10 NOTE — TELEPHONE ENCOUNTER
[facility] Call Center    Phone Message    May a detailed message be left on voicemail: yes     Reason for Call: Order(s): Home Care Orders: Physical Therapy (PT): 1 time a week for 4 weeks needed entered asap nurse going out of town.    Action Taken: Message routed to:  [facility] pcc    Travel Screening: Not Applicable     Date of Service:

## 2025-06-10 NOTE — PROGRESS NOTES
Spine Surgery Return Clinic Visit      Chief Complaint:   Consult (Low back )      Interval HPI:  Symptom Profile Including: location of symptoms, onset, severity, exacerbating/alleviating factors, previous treatments:        Nadira Perez is a 74 year old female who presents today for evaluation of right SI joint dysfunction as well as low back and right gluteal pains.  She has a history of a previous L5-S1 interbody fusion which was done for severe stenosis due to some ossified disc materials, and she did well with that for a number of years.  More recently she has been developing worsening back pain difficulty standing and walking.  She feels like the worst pain is over her right posterior superior iliac spine and she points specifically to that area and it goes down into the gluteal region.  Sometimes it travels down into the legs but not as much is in the back in that area.  She has had 2 previous SI injections with Dr. Saldivar.  The first 1 provided basically complete relief for a couple of months and then it wore off.  She then had a second fluoroscopically guided SI injection in February 2025 and this provided some very short-term relief but did not last as long so she is now referred to me to see if she would be a candidate for minimally invasive SI joint fusion.    She has done physical therapy in the past and has also taken a number of oral medications including anti-inflammatories and oxycodone.  She is worked hard to try to minimize her narcotic use.            Past Medical History:     Past Medical History:   Diagnosis Date    Anemia Off and on from years back    Arthritis     Bone disease     Breast cancer (H)     Cataract     Chest pain Since 2/12/24 sternal fracture (secondary to fall), pain level started high, then decreased to moderate two weeks later; then to mild to none since then.  Sneezing and coughing bring it up again to a moderate level.    Chronic osteoarthritis Many years    Diabetes  (H)     Difficult intubation     Use Peds #5 due to throat kyphoplasty    Gastroesophageal reflux disease     H/O kyphoplasty     Hearing problem     Hepatitis 1975    Monohepatitis    History of blood transfusion     History of kidney stones     History of radiation therapy     Hyperlipemia     Hyperlipidemia Recent concern with elevated results (been on statins for years).    Hypertension     Hypopotassemia     Irregular heart beat     Kidney problem     Lymph edema     Medullary sponge kidney     Motion sickness     Balance issues    Osteopenia     PONV (postoperative nausea and vomiting)     Reduced vision     Scoliosis Sceondary to advanced osteoporosis.    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    Thyroid disease             Past Surgical History:     Past Surgical History:   Procedure Laterality Date    ABDOMEN SURGERY      ovarian cyst, mesh    ARTHRODESIS WRIST Right     ARTHRODESIS WRIST  02/14/2013    Procedure: ARTHRODESIS WRIST;  left wrist scaphoid excision, four bone fusion, iliac crest bone graft  ( Mac with block);  Surgeon: Av Mendez MD;  [facility]    BIOPSY      skin, vaginal    BLEPHAROPLASTY BILATERAL Bilateral 05/06/2022    Procedure: UPPER BLEPHAROPLASTY, BILATERAL;  Surgeon: Jemal Sanchez MD;  Location: [facility] OR    CATARACT IOL, RT/LT Right 03/13/2018    CATARACT IOL, RT/LT Left 02/20/2018    COLONOSCOPY  12/24/2013    Procedure: COMBINED COLONOSCOPY, SINGLE BIOPSY/POLYPECTOMY BY BIOPSY;  COLONOSCOPY;  Surgeon: Dom Alvarez MD;  Location:  GI    COLONOSCOPY N/A 03/12/2024    Procedure: COLONOSCOPY, FLEXIBLE, WITH LESION REMOVAL USING SNARE;  Surgeon: Amina Espinoza MD;  Location:  GI    COSMETIC BLEPHAROPLASTY LOWER LIDS BILATERAL Bilateral 05/06/2022    Procedure: BLEPHAROPLASTY,  LOWER EYELID, BILATERAL, COSMETIC;  Surgeon: Jemal Sanchez MD;  Location: [facility] OR    COSMETIC SURGERY      ESOPHAGOSCOPY, GASTROSCOPY, DUODENOSCOPY (EGD), COMBINED N/A 11/23/2016    Procedure: COMBINED ESOPHAGOSCOPY, GASTROSCOPY, DUODENOSCOPY (EGD);  Surgeon: Quinten Feliciano MD;  Location:  GI    ESOPHAGOSCOPY, GASTROSCOPY, DUODENOSCOPY (EGD), COMBINED N/A 03/12/2024    Procedure: ESOPHAGOGASTRODUODENOSCOPY, WITH BIOPSY;  Surgeon: Amina Espinoza MD;  Location:  GI    EXTERNAL EAR SURGERY      right    EYE SURGERY      radial keratomy    FUSION, SPINE, INTERBODY, OBLIQUE ANTERIOR AND LUMBAR, 2 LEVELS, POST APPROACH, USING OTS N/A 11/18/2021    Procedure: Part 1: Oblique Anterior Interbody Fusion at Lumbar 5 to sacral 1 with use of Bone Morphogenic Protein,;  Surgeon: Serge Ramirez MD;  [facility]    GI SURGERY      Umbilical hernia repair    GRAFT BONE FROM ILIAC CREST  02/14/2013    Procedure: GRAFT BONE FROM ILIAC CREST;  mac with block and local infilitration;  Surgeon: Av Mendez MD;  [facility]     BREATH HYDROGEN TEST N/A 10/14/2016    Procedure: HYDROGEN BREATH TEST;  Surgeon: Cheri Barron MD;  Location:  GI    HERNIA REPAIR      umbilical age 18 mos.    HYSTERECTOMY      HYSTERECTOMY TOTAL ABDOMINAL  05/03/2000    INJECT EPIDURAL CAUDAL N/A 09/12/2023    Procedure: Caudal epidural steroid injection with fluoroscopy;  Surgeon: Natasha Umaña MD;  Location: [facility] OR    INJECT EPIDURAL CAUDAL N/A 01/02/2024    Procedure: INJECTION, EPIDURAL, CAUDAL;  Surgeon: Natasha Umaña MD;  Location: [facility] OR    INJECT EPIDURAL LUMBAR N/A 05/23/2023    Procedure: L3-4 interlaminar epidural steroid injection with fluoroscopy ( left> right);  Surgeon: Natasha Umaña MD;  Location: [facility] OR    INJECT JOINT SACROILIAC Left 04/27/2023    Procedure: Left sacroiliac joint steroid injection with fluoroscopy;  Surgeon: Natasha Umaña MD;  Location: [facility] OR    INJECT  JOINT SACROILIAC Bilateral 10/02/2024    Procedure: Bilateral sacroiliac joint injection;  Surgeon: Thais Saldivar MD;  Location: [facility] OR    INJECT SACROILIAC JOINT Bilateral 02/26/2025    Procedure: Bilateral sacroiliac joint steroid injections;  Surgeon: Thais Saldivar MD;  Location: [facility] OR    LAPAROSCOPY DIAGNOSTIC (GENERAL) N/A 05/04/2025    Procedure: LAPAROSCOPY, DIAGNOSTIC with lysis of adhesions, converted to;  Surgeon: Jacobo Cintron DO;  [facility]: [facility]    MASTECTOMY MODIFIED RADICAL Bilateral     bilateral; right breast prophylactic    OOPHOROPEXY      OPTICAL TRACKING SYSTEM FUSION POSTERIOR SPINE LUMBAR N/A 11/18/2021    Procedure: Open Posterior Instrumented Spinal Fusion at Lumbar 5 to Sacral 1, with grimm aguayo osteotomy, use of O-Arm/Stealth;  Surgeon: Serge Ramirez MD;  [facility]    OTOPLASTY Bilateral 07/25/2024    Procedure: Right Revision Functional Otoplasty;  Surgeon: Juan Agudelo MD;  Location: [facility] OR    OTOPLASTY Right 6/4/2025    Procedure: Right revision functional otoplasty;  Surgeon: Juan Agudelo MD;  Location: [facility] OR    PARATHYROIDECTOMY  09/23/2004    R SUPERIOR    PARATHYROIDECTOMY  09/23/2004    parathyroid resection, subtotal    PICC INSERTION - DOUBLE LUMEN Right 05/08/2025    38-1cm, Basilic vein    NV HAND/FINGER SURGERY UNLISTED  Bilateral partial wrist fusions 5500-2299    NV SPINE SURGERY PROCEDURE UNLISTED  December 2021    NV STOMACH SURGERY PROCEDURE UNLISTED  Several throughout the years    RELEASE CARPAL TUNNEL Right 12/02/2021    Procedure: RIGHT CARPAL TUNNEL RELEASE, RIGHT WRIST HARDWARE REMOVAL, RIGHT CARPAL BOSS EXCISION;  Surgeon: Rolan Conley MD;  Location: [facility] OR    REMOVE HARDWARE HAND  09/24/2013    Procedure: REMOVE HARDWARE HAND;  Left Hand Screw Removal       RESECT SMALL BOWEL WITHOUT OSTOMY N/A 05/04/2025    Procedure: open laparotomy with small bowel resection x1;  Surgeon: Abdulaziz  Jacobo Mercado DO;  Location: [facility] OR    RHINOPLASTY  1968    thyr proc skin closed cosmetic manner by subcuticular stitch  01/23/2009    THYROPLASTY  10/09/2009    TONSILLECTOMY  1977    VITRECTOMY PARSPLANA WITH 25 GAUGE SYSTEM Left 06/20/2022    Procedure: LEFT EYE VITRECTOMY, PARS PLANA APPROACH, USING 25-GAUGE INSTRUMENTS, laser;  Surgeon: Felix Carlos MD;  Location: [facility] OR    WRIST SURGERY      wrist arthrodesis            Social History:     Social History     Tobacco Use    Smoking status: Never     Passive exposure: Never    Smokeless tobacco: Never   Substance Use Topics    Alcohol use: Yes     Alcohol/week: 7.0 standard drinks of alcohol     Types: 7 Glasses of wine per week     Comment: Rare if ever            Family History:     Family History   Problem Relation Age of Onset    Neurologic Disorder Mother         Anuerysm of Cerebral Artery, Dementia    Diabetes Mother     Thyroid Disease Mother         Hyperthyroidism (goiter)    Cerebrovascular Disease Mother         Hemorrhagic    Osteoporosis Mother     Hyperlipidemia Mother     Spine Problems Mother         Spinal stenosis    Aneurysm Mother     Heart Disease Father         AAA    Hypertension Father     Coronary Artery Disease Father     Hyperlipidemia Father     Mental Illness Father         Schizophrenia?    Alcoholism Father     Spine Problems Father         Chronic back pain    Circulatory Brother         Perihperal Neurophathy    Peripheral Neuropathy Brother     Peripheral Neuropathy Brother     Diabetes Maternal Grandmother         Presumed Type 2    Asthma Maternal Grandmother     Obesity Maternal Grandmother     Early Death Maternal Grandmother     Chronic Obstructive Pulmonary Disease Maternal Grandfather         father    Asthma Maternal Grandfather     Diabetes Maternal Aunt         x2    Melanoma Maternal Aunt     Glaucoma Maternal Aunt     Breast Cancer Cousin     Breast Cancer Cousin     Cerebrovascular Disease Cousin      Obesity Other     Breast Cancer Other     Dementia Other         Maternal aunt    Cancer Other         malignant melanoma    Hypertension Other     Hypertension Other     Cerebrovascular Disease Other     Diabetes Other     Cerebrovascular Disease Other         Type unknown    Obesity Other     Respiratory Other     Cancer Other     Diabetes Other     Asthma Other     Other Cancer Other         Malignant melanoma    Obesity Other     Diabetes Other     Rheumatoid Arthritis Other     Diabetes Other     Hypertension Other     Cerebrovascular Disease Other         ,    Asthma Other     Obesity Other     Cancer Other     Breast Cancer Cousin     Arthritis Other     Obesity Other     Melanoma Other     Other Cancer Other         Malignant melanoma    Obesity Other     Rheumatoid Arthritis Other     Diabetes Other         Type 2    Hyperlipidemia Other     Diabetes Other         Type 2    Hyperlipidemia Other     Hypertension Brother     Hyperlipidemia Brother     Spine Problems Brother         Spinal stenosis    Cerebrovascular Disease Other     Hyperlipidemia Other     Breast Cancer Cousin         Metastasized in later years    Uterine Cancer Cousin     Ovarian Cancer Cousin     Uterine Cancer Cousin     Other Cancer Cousin         Ovarian    Obesity Cousin     Ovarian Cancer Cousin     Dementia Other     Kidney Disease Other     Hypertension Brother     Hyperlipidemia Brother     Other Cancer Cousin         Ovarian, Stage unknown    Obesity Cousin     Obesity Cousin     Obesity Other     Macular Degeneration No family hx of     Thrombosis No family hx of     Depression No family hx of     Substance Abuse No family hx of     Cystic Fibrosis No family hx of     Coronary Artery Disease Early Onset No family hx of     Heart Failure No family hx of     Bleeding Diathesis No family hx of     Prostate Cancer No family hx of     Colorectal Cancer No family hx of     Pancreatic Cancer No family hx of     Lung Cancer No family  hx of     Autoimmune Disease No family hx of     Unknown/Adopted No family hx of     Genetic Disorder No family hx of     Bleeding Disorder No family hx of     Clotting Disorder No family hx of     Anesthesia Reaction No family hx of             Allergies:     Allergies   Allergen Reactions    Erythromycin Nausea    Penicillin G     Penicillins Hives     Around age 6 - doesn't recall full reaction, mother told her it was hives.     Has tolerated cephalsporins.             Medications:     Current Outpatient Medications   Medication Sig Dispense Refill    acetaminophen (TYLENOL) 500 MG tablet Take 1,000 mg by mouth 3 times daily      artificial tears OINT ophthalmic ointment Place into both eyes nightly as needed for dry eyes.      atorvastatin (LIPITOR) 80 MG tablet Take 1 tablet (80 mg) by mouth daily. 90 tablet 3    bacitracin 500 UNIT/GM external ointment Apply topically 2 times daily. Please apply to the right ear incision twice daily. 28 g 1    carvedilol (COREG) 12.5 MG tablet Take 1 tablet (12.5 mg) by mouth 2 times daily (with meals). 180 tablet 3    CRANBERRY EXTRACT PO Take 500 mg by mouth every morning      diclofenac (VOLTAREN) 1 % topical gel Apply 2 g topically 4 times daily 100 g 6    docusate sodium (COLACE) 100 MG capsule Take 1 capsule (100 mg) by mouth 2 times daily. 12 capsule 0    gabapentin (NEURONTIN) 300 MG capsule Take 4 capsules (1,200 mg) by mouth 3 times daily. 1080 capsule 3    HEMP OIL OR EXTRACT OR OTHER CBD CANNABINOID, NOT MEDICAL CANNABIS, THC      hydrochlorothiazide (HYDRODIURIL) 25 MG tablet Take 1 tablet (25 mg) by mouth daily. 90 tablet 3    levothyroxine (SYNTHROID/LEVOTHROID) 112 MCG tablet TAKE 1/2 TAB BY MOUTH EVERY DAY 45 tablet 3    lisinopril (ZESTRIL) 20 MG tablet Take 1 tablet (20 mg) by mouth 2 times daily. 180 tablet 3    MAGNESIUM GLYCINATE PO Take 800 mg by mouth at bedtime.      Melatonin 10 MG TABS tablet Take 10 mg by mouth nightly as needed.      methocarbamol  (ROBAXIN) 500 MG tablet Take 1 tablet (500 mg) by mouth every 8 hours as needed for muscle spasms. 30 tablet 0    Multiple Vitamin (MULTI-VITAMIN) per tablet Take 1 tablet by mouth every morning      naloxone (NARCAN) 4 MG/0.1ML nasal spray Spray 1 spray (4 mg) into one nostril alternating nostrils as needed for opioid reversal. every 2-3 minutes until assistance arrives 0.2 mL 0    omeprazole (PRILOSEC) 20 MG DR capsule Take 1 capsule (20 mg) by mouth daily. 90 capsule 3    ondansetron (ZOFRAN) 4 MG tablet Take 1 tablet (4 mg) by mouth every 6 hours as needed for nausea. 12 tablet 0    spironolactone (ALDACTONE) 25 MG tablet Take 1 tablet (25 mg) by mouth daily at 2 pm. (Patient taking differently: Take 50 mg by mouth daily at 2 pm.) 90 tablet 3    tiZANidine (ZANAFLEX) 4 MG tablet Take 1 tablet (4 mg) by mouth at bedtime. 90 tablet 3    Turmeric 500 MG CAPS Take 500 mg by mouth every morning      doxycycline hyclate (VIBRAMYCIN) 100 MG capsule Take 1 capsule (100 mg) by mouth 2 times daily. 6 capsule 0    ondansetron (ZOFRAN ODT) 4 MG ODT tab Take 1 tablet (4 mg) by mouth every 8 hours as needed for nausea. 90 tablet 0    ondansetron (ZOFRAN) 4 MG tablet Take 1 tablet (4 mg) by mouth every 6 hours as needed for nausea. 12 tablet 3    ondansetron (ZOFRAN-ODT) 4 MG ODT tab Take 1 tablet (4 mg) by mouth every 12 hours as needed for nausea 180 tablet 3    oxyCODONE (ROXICODONE) 5 MG tablet Take 1 tablet (5 mg) by mouth every 6 hours as needed for severe pain. #90 tabs to last 30 days. May fill 5/27 90 tablet 0     No current facility-administered medications for this visit.             Review of Systems:   A focused musculoskeletal and neurologic ROS was performed with pertinent positives and negatives noted in the HPI.  Additional systems were also reviewed and are documented at the bottom of the note.         Physical Exam:   Vitals: LMP  (LMP Unknown)   Musculoskeletal, Neurologic, and Spine:          Lumbar  Spine:    Appearance - No gross stepoffs or deformities    Motor -     L2-3: Hip flexion 5/5 R and 5/5 L strength          L3/4:  Knee extension R 4/5 and L 4/5 strength         L4/5:  Foot dorsiflexion R 1/5 L 1/5 and       EHL dorsiflexion R 1/5 L 4/5 strength         S1:  Plantarflexion/Peroneal Muscles  R 1/5 and L 1/5 strength    Sensation: intact to light touch L3-S1 distribution BLE    CHronic bilateral foot drop  in AFOS          I examined her right SI joint.  She has a positive thigh thrust lateral compression and anterior pelvic compression.  She is also tender over the right greater trochanter         Imaging:   We ordered and independently reviewed new radiographs at this clinic visit. The results were discussed with the patient. Findings include:     I reviewed standing lumbar radiographs which show a large magnitude lumbar scoliosis with severe degenerative change at the L3-4 and L4-5 levels above previous L5-S1 interbody fusion    I also reviewed CT scan from 2025 the lumbar spine which shows likely solid arthrodesis of the previous L5-S1 fusion with severe degenerative changes at adjacent levels as well as an old compression fracture near the thoracolumbar junction       Assessment and Plan:     74 year old female with some chronic lower extremity weakness wearing bilateral AFOs, degenerative scoliosis, osteoporosis and right SI joint dysfunction.    We had a nice discussion today.  I explained that I do not think surgery is possible for her scoliosis given that she has osteoporosis I think there would be a high risk of nonunion and implant failure.    I also do not think any surgery is likely to change her lower extremity weakness which is longstanding and requires the bilateral AFOs.    If we were to do anything the main goal would be to try to alleviate some of the pain over her right SI joint.  She does have a positive Hang finger sign and a positive SI joint exam.  I explained that the  surgery for this would be a minimally invasive SI joint fusion and I think there is about a 60% chance of success.  The reason I do not quote her a higher chance of success is because of her osteoporosis and scoliosis.  I think with her osteoporosis there is a chance the bones may not heal, and there is also a chance she could have continued referred pain from the upper areas.  She feels very disabled by this and she does feel that with the previous SI injection she really got quite a lot of relief and so that helped her to localize the problem to this area.  Given this she would like to proceed with an attempt at a minimally invasive SI joint fusion.  I explained the risk of incomplete symptom relief nonunion risk of infection risk of implant malposition and need for revision in the future and she expressed understanding.  I will move forward with getting that arranged for her.    Separate from this I did provide her a right trochanteric bursal injection to help with some of her lateral hip pain.  I also advised hip abduction exercises    Procedure:  The skin over the lateral aspect of the right greater trochanter was prepared with chlorhexidine.  I then injected a mixture of 10 cc of 1% lidocaine and 40 mg of Kenalog in a sterile fashion.  A Band-Aid was applied.  There were no immediately evident complications.    I personally performed the injection           Respectfully,  Serge Ramirez MD  Spine Surgery  [facility]

## 2025-06-10 NOTE — NURSING NOTE
[facility] [address] [id number] [phone]  Dept: [phone]  ______________________________________________________________________________    Patient: Nadira Perez   : 1950   MRN: [id number]   Honey 10, 2025    INVASIVE PROCEDURE SAFETY CHECKLIST    Date: 6/10/2025   Procedure:Right greater trochanteric bursa steroid injection   Patient Name: Nadira Perez  MRN: [id number]  YOB: 1950    Action: Complete sections as appropriate. Any discrepancy results in a HARD COPY until resolved.     PRE PROCEDURE:  Patient ID verified with 2 identifiers (name and  or MRN): Yes  Procedure and site verified with patient/designee (when able): Yes  Accurate consent documentation in medical record: Yes  H&P (or appropriate assessment) documented in medical record: Yes  H&P must be up to 20 days prior to procedure and updates within 24 hours of procedure as applicable: Yes  Relevant diagnostic and radiology test results appropriately labeled and displayed as applicable: Yes  Procedure site(s) marked with provider initials: Yes    TIMEOUT:  Time-Out performed immediately prior to starting procedure, including verbal and active participation of all team members addressing the following:Yes  * Correct patient identify  * Confirmed that the correct side and site are marked  * An accurate procedure consent form  * Agreement on the procedure to be done  * Correct patient position  * Relevant images and results are properly labeled and appropriately displayed  * The need to administer antibiotics or fluids for irrigation purposes during the procedure as applicable   * Safety precautions based on patient history or medication use    DURING PROCEDURE: Verification of correct person, site, and procedures any time the responsibility for care of the patient is transferred to another member of the care team.       The following  medications were given:         Prior to injection, verified patient identity using patient's name and date of birth.  Due to injection administration, patient instructed to remain in clinic for 15 minutes  afterwards, and to report any adverse reaction to me immediately.    Bursa injection was performed.    Medication Name: Kenalog NDC [address]-9  Drug Amount Wasted:  None.  Vial/Syringe: Single dose vial  Expiration Date:  11/26    Medication Name lidocaine  NDC 78298-122-95    Scribed by Sun Mendoza for Dr. Ramirez on Honey 10, 2025 at 3:39 pm based on the provider's statements to me.     Sun Mendoza

## 2025-06-10 NOTE — NURSING NOTE
Reason For Visit:   Chief Complaint   Patient presents with    Consult     Low back        Primary MD: Matilde Quiñonez  Ref. MD: established     ?  No  Date of surgery: 11/18/21  Type of surgery: Part 1: Oblique Anterior Interbody Fusion at Lumbar 5 to sacral 1 with use of Bone Morphogenic Protein,   Part 2:Open Posterior Instrumented Spinal Fusion at Lumbar 5 to Sacral 1, with grimm aguayo osteotomy, use of O-Arm/Stealth .    LMP  (LMP Unknown)     Pain Assessment  Patient Currently in Pain: Yes (5)    Oswestry (JOHN) Questionnaire        6/2/2025     1:51 PM   OSWESTRY DISABILITY INDEX   Count 10    Sum 31    Oswestry Score (%) 62 %        Patient-reported          Visual Analog Pain Scale  Back Pain Scale 0-10: 5  Right leg pain: 5  Left leg pain: 0  Neck Pain Scale 0-10: 3  Right arm pain: 0  Left arm pain: 0    Promis 10 Assessment        3/30/2024     4:42 PM   PROMIS 10   In general, would you say your health is: Good   In general, would you say your quality of life is: Very good   In general, how would you rate your physical health? Good   In general, how would you rate your mental health, including your mood and your ability to think? Very good   In general, how would you rate your satisfaction with your social activities and relationships? Very good   In general, please rate how well you carry out your usual social activities and roles Very good   To what extent are you able to carry out your everyday physical activities such as walking, climbing stairs, carrying groceries, or moving a chair? A little   In the past 7 days, how often have you been bothered by emotional problems such as feeling anxious, depressed, or irritable? Never   In the past 7 days, how would you rate your fatigue on average? Moderate   In the past 7 days, how would you rate your pain on average, where 0 means no pain, and 10 means worst imaginable pain? 4   In general, would you say your health is: 3   In general, would  you say your quality of life is: 4   In general, how would you rate your physical health? 3   In general, how would you rate your mental health, including your mood and your ability to think? 4   In general, how would you rate your satisfaction with your social activities and relationships? 4   In general, please rate how well you carry out your usual social activities and roles. (This includes activities at home, at work and in your community, and responsibilities as a parent, child, spouse, employee, friend, etc.) 4   To what extent are you able to carry out your everyday physical activities such as walking, climbing stairs, carrying groceries, or moving a chair? 2   In the past 7 days, how often have you been bothered by emotional problems such as feeling anxious, depressed, or irritable? 1   In the past 7 days, how would you rate your fatigue on average? 3   In the past 7 days, how would you rate your pain on average, where 0 means no pain, and 10 means worst imaginable pain? 4   Global Mental Health Score 17   Global Physical Health Score 11   PROMIS TOTAL - SUBSCORES 28                Sun Mendoza

## 2025-06-11 ENCOUNTER — TELEPHONE (OUTPATIENT)
Dept: NEUROSURGERY | Facility: CLINIC | Age: 75
End: 2025-06-11
Payer: COMMERCIAL

## 2025-06-11 ENCOUNTER — DOCUMENTATION ONLY (OUTPATIENT)
Dept: INTERNAL MEDICINE | Facility: CLINIC | Age: 75
End: 2025-06-11
Payer: COMMERCIAL

## 2025-06-11 NOTE — TELEPHONE ENCOUNTER
Called patient to schedule surgery with Dr. Ramirez. Let patient know due to having injection on 7/2/25 it is recommended by the providers care team that surgery is scheduled into end of July or beginning of August. Patient said she would like to take down a few dates and confirm them with her brother prior to confirming a day. Patient offered 7/30, 7/31, 8/6, 8/7, and 8/08. Patient said she will give our scheduling team a call back with her preference on surgery date. Patient provided with callback number [phone].     Minna Samuel on 6/11/2025 at 2:54 PM     Patient awake, alert, oriented x 4, lying quietly in bed without distress or verbalized pain, rt leg elevated on pillow, saline locks intact, flush freely, no redness or edema at site, urinal with in reach, personal items and call light with in reach, bed at lowest position, wheels locked, side rail sup x 2, will continue to monitor.

## 2025-06-11 NOTE — PROGRESS NOTES
Type of Form Received: [employer] [id number]    Form Received (Date) 6/10/25   Form Filled out No   Placed in provider folder Yes

## 2025-06-13 ENCOUNTER — OFFICE VISIT (OUTPATIENT)
Dept: PHARMACY | Facility: CLINIC | Age: 75
End: 2025-06-13
Payer: COMMERCIAL

## 2025-06-13 DIAGNOSIS — R52 PAIN: ICD-10-CM

## 2025-06-13 DIAGNOSIS — R11.0 NAUSEA: ICD-10-CM

## 2025-06-13 DIAGNOSIS — K59.00 CONSTIPATION, UNSPECIFIED CONSTIPATION TYPE: ICD-10-CM

## 2025-06-13 DIAGNOSIS — K21.9 GASTROESOPHAGEAL REFLUX DISEASE, UNSPECIFIED WHETHER ESOPHAGITIS PRESENT: ICD-10-CM

## 2025-06-13 DIAGNOSIS — Z78.9 TAKES DIETARY SUPPLEMENTS: ICD-10-CM

## 2025-06-13 DIAGNOSIS — E78.2 MIXED HYPERLIPIDEMIA: ICD-10-CM

## 2025-06-13 DIAGNOSIS — I10 ESSENTIAL HYPERTENSION, BENIGN: Primary | ICD-10-CM

## 2025-06-13 DIAGNOSIS — E03.9 HYPOTHYROIDISM, UNSPECIFIED TYPE: ICD-10-CM

## 2025-06-13 PROCEDURE — 99605 MTMS BY PHARM NP 15 MIN: CPT

## 2025-06-13 PROCEDURE — 99607 MTMS BY PHARM ADDL 15 MIN: CPT

## 2025-06-13 RX ORDER — FAMOTIDINE 20 MG/1
20 TABLET, FILM COATED ORAL 2 TIMES DAILY PRN
Qty: 60 TABLET | Refills: 3 | Status: SHIPPED | OUTPATIENT
Start: 2025-06-13

## 2025-06-13 NOTE — LETTER
"Recommended To-Do List      Prepared on: Jun 13, 2025       You can get the best results from your medications by completing the items on this \"To-Do List.\"      Bring your To-Do List when you go to your doctor. And, share it with your family or caregivers.    My To-Do List:  What we talked about: What I should do:   Hypertension  I will ask Dr. Rascon about decreasing carvedilol to 6.25 mg twice daily so that you can take it twice daily, everyday. I will also ask her about taking lisinopril 40 mg once daily instead of 20 mg twice daily.           What we talked about: What I should do:   Cholesterol Levels    I've placed a lab order to recheck cholesterol levels. I will ask Dr. Rascon what your LDL goal is.           What we talked about: What I should do:   Heartburn    Okay to try Pepcid (famotidine) 20 mg up to twice daily as needed for heartburn. I sent a prescription for this to your [facility] pharmacy.                 "

## 2025-06-13 NOTE — PROGRESS NOTES
Medication Therapy Management (MTM) Encounter    ASSESSMENT:                            Medication Adherence/Access: No issues identified.    Hypertension  Blood pressure is not at goal of <140/90 mmHg.  Since carvedilol 12.5 mg twice daily was causing SBP ~90 mmHg and patient has only been taking once daily, patient may benefit from decreasing dose so that she feels comfortable taking twice daily. Will discuss with cardiologist. To simplify regimen, will also ask cardiologist if okay to take lisinopril 40 mg once daily instead of 20 mg twice daily.    Hyperlipidemia   Patient is due to repeat lipid panel.  Will clarify with cardiologist what LDL goal is.    Constipation   Stable.     GERD, Nausea  Since not having frequent acid flux symptoms, patient may benefit from continuing without omeprazole and instead using famotidine up to twice daily as needed. Especially considering long-term PPI use is associated with nutritional deficiencies including B12, magnesium, and calcium.     Pain   Stable, will be having another back surgery soon.     Hypothyroidism  Stable, recent TSH within range.     Supplements, Topicals  Stable.    PLAN:                            I will ask Dr. Rascon about decreasing carvedilol to 6.25 mg twice daily so that you can take it twice daily, everyday. I will also ask her about taking lisinopril 40 mg once daily instead of 20 mg twice daily.  I've placed a lab order to recheck cholesterol levels. I will ask Dr. Rascon what your LDL goal is.  Okay to try Pepcid (famotidine) 20 mg up to twice daily as needed for heartburn. I sent a prescription for this to your [facility] pharmacy.    Follow-up: with MTM in 2 months    SUBJECTIVE/OBJECTIVE:                          Ismael Perez is a 74 year old female seen for a follow-up visit.     Reason for visit: Follow up, annual medication review.    Allergies/ADRs: Reviewed in chart  Past Medical History: Reviewed in chart  Tobacco: She reports that she has  never smoked. She has never been exposed to tobacco smoke. She has never used smokeless tobacco.  Alcohol: Less than 1 beverages / week    Medication Adherence/Access: no issues reported.    Hypertension - Follows with Cardiology  Lisinopril 20 mg twice daily  Carvedilol 12.5 mg twice daily - has been taking once daily  Spironolactone 25 mg daily - dehydrated with 50 mg daily  Hydrochlorothiazide 25 mg daily - gets edema if she stops taking  Patient is not experiencing side effects. However, she notes blood pressure was going too low (SBP ~90 mmHg) with carvedilol 12.5 mg twice daily, so she's only been taking it once daily. She is wondering if lisinopril can be taken once daily instead of twice daily. She hasn't been checking blood pressure at home lately. Patient has verified the accuracy of her home monitor by comparing it to readings in clinic.    BP Readings from Last 6 Encounters:   06/13/25 (!) 143/89   06/04/25 (!) 140/95   05/29/25 128/76   05/28/25 128/84   05/27/25 106/67   05/19/25 120/77     Pulse Readings from Last 3 Encounters:   06/13/25 80   06/04/25 79   05/29/25 78      Hyperlipidemia   Atorvastatin 80 mg daily  Patient reports no myalgias or other side effects.  The 10-year ASCVD risk score (Nicole THAKKAR, et al., 2019) is: 33.9%    Values used to calculate the score:      Age: 74 years      Sex: Female      Is Non- : No      Diabetic: Yes      Tobacco smoker: No      Systolic Blood Pressure: 143 mmHg      Is BP treated: Yes      HDL Cholesterol: 56 mg/dL      Total Cholesterol: 214 mg/dL     Constipation   Docusate 100 mg twice daily  Patient feels that current therapy is effective.   Patient reports no current medication side effects.       GERD , Nausea  Omeprazole 20 mg daily - not taking  Ondansetron 4 mg every 8 hours as needed  Not taking omeprazole because she isn't having frequent heartburn symptoms. She would be open to trying famotidine as needed instead. Reports  loss of appetite and some nausea, but hasn't used ondansetron yet.     Pain   Acetaminophen 1000 mg 3 times daily  Diclofenac 1% gel 2 grams 4 times daily on wrists  Gabapentin 1200 mg 3 times daily  Methocarbamol 500 mg every 8 hours as needed - takes 1-2 times daily  Tizanidine 4 mg by mouth at bedtime - holding  Oxycodone 5 mg every 6 hours as needed - taking 3 times daily  Narcan nasal spray as needed  Non-medical cannabis daily  Notes she will be having another back surgery. Her baseline pain is 5/10, sometimes increases to 6-7/10 with walking or standing.  Patient feels that current therapy is mostly effective, notes pain is much worse without gabapentin. However, she isn't sure if methocarbamol is helping. She's holding tizanidine while taking methocarbamol, but may consider switching back.  Patient reports the following side effects: no medication side effects     Hypothyroidism   Levothyroxine 56 mcg daily  Takes consistently with regard to other medications and food.  Patient is having the following symptoms: none.      Supplements , Topicals  Cranberry 500 mg daily for UTI prevention  Magnesium 800 mg at bedtime   Melatonin 10 mg at bedtime as needed  Multivitamin 1 tablet daily  Turmeric 500 mg daily - not currently taking  Artificial tears ointment in both eyes nightly as needed  She hasn't had any UTIs since starting cranberry.  Melatonin is helping with sleep.  No issues reported at this time.     Today's Vitals: LMP  (LMP Unknown)   ----------------    I spent 50 minutes with this patient today. All changes were made via collaborative practice agreement with MERCEDES Kyle CNP.     A summary of these recommendations was sent via [employer].    Meredith Culp (Hailie), PharmD, MPH  Medication Therapy Management Pharmacist      Medication Therapy Recommendations  Essential hypertension, benign   1 Current Medication: carvedilol (COREG) 12.5 MG tablet   Current Medication Sig: Take 1 tablet (12.5  mg) by mouth 2 times daily (with meals).   Rationale: Dose too high - Dosage too high - Safety   Recommendation: Decrease Dose - carvedilol 6.25 MG tablet   Status: Contact Provider - Awaiting Response   Identified Date: 6/13/2025         2 Current Medication: lisinopril (ZESTRIL) 20 MG tablet   Current Medication Sig: Take 1 tablet (20 mg) by mouth 2 times daily.   Rationale: Patient forgets to take - Adherence - Adherence   Recommendation: Provide Adherence Intervention - Switch from twice daily to once daily   Status: Contact Provider - Awaiting Response   Identified Date: 6/13/2025         Gastroesophageal reflux disease, unspecified whether esophagitis present   1 Current Medication: omeprazole (PRILOSEC) 20 MG DR capsule   Current Medication Sig: Take 1 capsule (20 mg) by mouth daily.   Rationale: Untreated condition - Needs additional medication therapy - Indication   Recommendation: Start Medication - famotidine 20 MG tablet   Status: Accepted per CPA   Identified Date: 6/13/2025 Completed Date: 6/13/2025

## 2025-06-13 NOTE — LETTER
June 18, 2025  Nadira Perez  [id number]  [place] [address]    Dear Ms. Perez, [facility]     Thank you for talking with me on Jun 13, 2025 about your health and medications. As a follow-up to our conversation, I have included two documents:      Your Recommended To-Do List has steps you should take to get the best results from your medications.  Your Medication List will help you keep track of your medications and how to take them.    If you want to talk about these documents, please call Joann Culp RPH at phone: [phone], Monday-Friday 8-4:30pm.    I look forward to working with you and your doctors to make sure your medications work well for you.    Sincerely,  Joann Culp RPH  [facility] Pharmacist, [facility]

## 2025-06-13 NOTE — LETTER
_  Medication List        Prepared on: Jun 13, 2025     Bring your Medication List when you go to the doctor, hospital, or   emergency room. And, share it with your family or caregivers.     Note any changes to how you take your medications.  Cross out medications when you no longer use them.    Medication How I take it Why I use it Prescriber   acetaminophen (TYLENOL) 500 MG tablet Take 1,000 mg by mouth 3 times daily Pain Patient Reported   artificial tears OINT ophthalmic ointment Place into both eyes nightly as needed for dry eyes. Dry Eyes Patient Reported   atorvastatin (LIPITOR) 80 MG tablet Take 1 tablet (80 mg) by mouth daily. Hypercholesterolemia MERCEDES Rivera CNP   carvedilol (COREG) 12.5 MG tablet Take 1 tablet (12.5 mg) by mouth 2 times daily (with meals). Hypertension MERCEDES Rivera CNP   CRANBERRY EXTRACT PO Take 500 mg by mouth every morning UTI Prevention Patient Reported   diclofenac (VOLTAREN) 1 % topical gel Apply 2 g topically 4 times daily Pain Rolan Conley MD   docusate sodium (COLACE) 100 MG capsule Take 1 capsule (100 mg) by mouth 2 times daily. Constipation Juan Agudelo MD   famotidine (PEPCID) 20 MG tablet Take 1 tablet (20 mg) by mouth 2 times daily as needed (heartburn). Heartburn MERCEDES Rivera CNP   gabapentin (NEURONTIN) 300 MG capsule Take 4 capsules (1,200 mg) by mouth 3 times daily. Pain MERCEDES Rivera CNP   HEMP OIL OR EXTRACT OR OTHER CBD CANNABINOID, NOT MEDICAL CANNABIS, THC Pain Patient Reported   hydrochlorothiazide (HYDRODIURIL) 25 MG tablet Take 1 tablet (25 mg) by mouth daily. Hypertension MERCEDES Rivera CNP   levothyroxine (SYNTHROID/LEVOTHROID) 112 MCG tablet TAKE 1/2 TAB BY MOUTH EVERY DAY Hypothyroidism MERCEDES Rivera CNP   lisinopril (ZESTRIL) 20 MG tablet Take 1 tablet (20 mg) by mouth 2 times daily. Hypertension MERCEDES Rivera CNP   MAGNESIUM GLYCINATE PO Take 800 mg by mouth at bedtime. Supplement  Patient Reported   Melatonin 10 MG TABS tablet Take 10 mg by mouth nightly as needed. Insomnia Patient Reported   methocarbamol (ROBAXIN) 500 MG tablet Take 1 tablet (500 mg) by mouth every 8 hours as needed for muscle spasms. Pain Jacobo Cintron,    Multiple Vitamin (MULTI-VITAMIN) per tablet Take 1 tablet by mouth every morning Supplement Patient Reported   naloxone (NARCAN) 4 MG/0.1ML nasal spray Spray 1 spray (4 mg) into one nostril alternating nostrils as needed for opioid reversal. every 2-3 minutes until assistance arrives Accidental Opioid Overdose MERCEDES Pace CNP   omeprazole (PRILOSEC) 20 MG DR capsule Take 1 capsule (20 mg) by mouth daily. Heartburn MERCEDES Rivera CNP   ondansetron (ZOFRAN) 4 MG tablet Take 1 tablet (4 mg) by mouth every 6 hours as needed for nausea. Nausea Juan Agudelo MD   oxyCODONE (ROXICODONE) 5 MG tablet Take 1 tablet (5 mg) by mouth every 6 hours as needed for severe pain. #90 tabs to last 30 days. May fill 5/27 Pain MERCEDES Pace CNP   spironolactone (ALDACTONE) 25 MG tablet Take 1 tablet (25 mg) by mouth daily at 2 pm. Hypertension Cinthia Rascon MD   tiZANidine (ZANAFLEX) 4 MG tablet Take 1 tablet (4 mg) by mouth at bedtime. Pain MERCEDES Rivera CNP   Turmeric 500 MG CAPS Take 500 mg by mouth every morning Supplement Patient Reported         Add new medications, over-the-counter drugs, herbals, vitamins, or  minerals in the blank rows below.    Medication How I take it Why I use it Prescriber                                      Allergies:      - Erythromycin - Nausea  - Penicillin G  - Penicillins - Hives        Side effects I have had:      Not on File        Other Information:              My notes and questions:

## 2025-06-13 NOTE — PATIENT INSTRUCTIONS
"Recommendations from today's MTM visit:                                                       I will ask Dr. Rascon about decreasing carvedilol to 6.25 mg twice daily so that you can take it twice daily, everyday. I will also ask her about taking lisinopril 40 mg once daily instead of 20 mg twice daily.  I've placed a lab order to recheck cholesterol levels. I will ask Dr. Rascon what your LDL goal is.  Okay to try Pepcid (famotidine) 20 mg up to twice daily as needed for heartburn. I sent a prescription for this to your [facility] pharmacy.    Follow-up: with MTM in 2 months    It was great speaking with you today.  I value your experience and would be very thankful for your time in providing feedback in our clinic survey. In the next few days, you may receive an email or text message from [employer] with a link to a survey related to your  clinical pharmacist.\"     To schedule another MTM appointment, please call the clinic directly or you may call the MTM scheduling line at [phone] or toll-free at [phone].     My Clinical Pharmacist's contact information:                                                      Please feel free to contact me with any questions or concerns you have.      Meredith Culp (Hailie), VilmaD, MPH  Medication Therapy Management Pharmacist   "

## 2025-06-17 ENCOUNTER — MEDICAL CORRESPONDENCE (OUTPATIENT)
Dept: HEALTH INFORMATION MANAGEMENT | Facility: CLINIC | Age: 75
End: 2025-06-17
Payer: COMMERCIAL

## 2025-06-18 NOTE — TELEPHONE ENCOUNTER
FUTURE VISIT INFORMATION      SURGERY INFORMATION:  Date: 25  Location: [facility]  Surgeon:  Serge Ramirez MD   Anesthesia Type:  General  Procedure: Right Minimally Invasive FUSION, SACROILIAC JOINT, USING OPTICAL TRACKING SYSTEM, use of SI Bone Torque implants   Consult: 6/10/25    RECORDS REQUESTED FROM:       Primary Care Provider: Matilde Quiñonez APRN CNP    Pertinent Medical History: Anemia, Diabetes, GERD, Difficult intubation, History of blood transfusion, HLD, HTN, Hypopotassemia, Irregular heart beat, Medullary sponge kidney, PONV, Sleep apnea, Thyroid disease     Most recent EKG+ Tracin/3/25, internal    Most recent ECHO: 24, internal    Most recent Sleep Study: 25, internal

## 2025-06-30 DIAGNOSIS — G62.9 PERIPHERAL POLYNEUROPATHY: ICD-10-CM

## 2025-06-30 DIAGNOSIS — M53.3 PAIN OF BOTH SACROILIAC JOINTS: ICD-10-CM

## 2025-06-30 DIAGNOSIS — M54.17 LUMBOSACRAL RADICULOPATHY: ICD-10-CM

## 2025-06-30 RX ORDER — OXYCODONE HYDROCHLORIDE 5 MG/1
5 TABLET ORAL EVERY 6 HOURS PRN
Qty: 90 TABLET | Refills: 0 | Status: SHIPPED | OUTPATIENT
Start: 2025-06-30

## 2025-06-30 NOTE — TELEPHONE ENCOUNTER
Request appears appropriate, refill approved for 30 day supply +0 additional fill(s).     Renée Harris DNP, APRN, AGNP-C  [facility]

## 2025-06-30 NOTE — TELEPHONE ENCOUNTER
Refill request    Medication: oxyCODONE (ROXICODONE) 5 MG tablet     Sig: Take 1 tablet (5 mg) by mouth every 6 hours as needed for severe pain. #90 tabs to last 30 days.     Dispensed: 90  Refills: 0  SOLD to the pt on: 6/4/25     Last clinic appointment: 5/27/25  Next clinic appointment: 7/22/25    Last Drug Screen Collected: 5/27/25  Controlled Substance Agreement signed: 5/27/25    Preferred pharmacy:    [place] [id number]    Refill request routed to the provider to review.

## 2025-07-02 ENCOUNTER — HOSPITAL ENCOUNTER (OUTPATIENT)
Facility: AMBULATORY SURGERY CENTER | Age: 75
Discharge: HOME | End: 2025-07-02
Attending: PHYSICAL MEDICINE & REHABILITATION | Admitting: PHYSICAL MEDICINE & REHABILITATION
Payer: COMMERCIAL

## 2025-07-02 ENCOUNTER — ANCILLARY PROCEDURE (OUTPATIENT)
Dept: RADIOLOGY | Facility: AMBULATORY SURGERY CENTER | Age: 75
End: 2025-07-02
Attending: PHYSICAL MEDICINE & REHABILITATION
Payer: COMMERCIAL

## 2025-07-02 VITALS
BODY MASS INDEX: 25.83 KG/M2 | DIASTOLIC BLOOD PRESSURE: 91 MMHG | WEIGHT: 155 LBS | OXYGEN SATURATION: 98 % | RESPIRATION RATE: 16 BRPM | HEART RATE: 78 BPM | TEMPERATURE: 97.4 F | SYSTOLIC BLOOD PRESSURE: 153 MMHG | HEIGHT: 65 IN

## 2025-07-02 DIAGNOSIS — R52 PAIN: ICD-10-CM

## 2025-07-02 PROCEDURE — G0260 INJ FOR SACROILIAC JT ANESTH: HCPCS | Mod: LT

## 2025-07-02 PROCEDURE — 27096 INJECT SACROILIAC JOINT: CPT | Mod: LT | Performed by: PHYSICAL MEDICINE & REHABILITATION

## 2025-07-02 RX ORDER — LIDOCAINE HYDROCHLORIDE 10 MG/ML
INJECTION, SOLUTION EPIDURAL; INFILTRATION; INTRACAUDAL; PERINEURAL DAILY PRN
Status: DISCONTINUED | OUTPATIENT
Start: 2025-07-02 | End: 2025-07-02 | Stop reason: HOSPADM

## 2025-07-02 RX ORDER — IOPAMIDOL 408 MG/ML
INJECTION, SOLUTION INTRATHECAL DAILY PRN
Status: DISCONTINUED | OUTPATIENT
Start: 2025-07-02 | End: 2025-07-02 | Stop reason: HOSPADM

## 2025-07-02 RX ORDER — TRIAMCINOLONE ACETONIDE 40 MG/ML
INJECTION, SUSPENSION INTRA-ARTICULAR; INTRAMUSCULAR DAILY PRN
Status: DISCONTINUED | OUTPATIENT
Start: 2025-07-02 | End: 2025-07-02 | Stop reason: HOSPADM

## 2025-07-02 NOTE — OP NOTE
PROCEDURE NOTE: Sacroiliac Joint Injection    PROCEDURE DATE: 7/2/2025    PATIENT NAME: Nadira Perez  YOB: 1950    ATTENDING PHYSICIAN: Thais Saldivar MD   RESIDENT/FELLOW: None    PREOPERATIVE DIAGNOSIS: Bilateral Sacroiliac Joint Pain  POSTOPERATIVE DIAGNOSIS: Same    PROCEDURE PLANNED: Bilateral Sacroiliac joint injection with fluoroscopic guidance  PROCEDURE PERFORMED: Left Sacroiliac joint injection with fluoroscopic guidance      FLUOROSCOPY WAS USED.     INDICATIONS FOR PROCEDURE:   Nadira Perez is a 75 year old female with a clinical picture consistent with Bilateral sacroiliac joint pain (R>>L).     PROCEDURE AND FINDINGS:    She was greeted in the pre-procedure holding area. The risk, benefits and alternatives to the procedure were again reviewed with the patient and written informed consent was placed in the chart. An IV line was not placed.  A 500 mL bag of NS was not connected to the patient. She was taken to the procedure room and positioned prone on the fluoroscopy table.  Routine monitors were applied including EKG leads (if sedation was used), blood pressure cuff, and pulse oximetry. Prior to the procedure a time out was completed, verifying correct patient, procedure, site, positioning, and implants and/or special equipment.     The skin was prepped with chlorhexidine and draped in the usual sterile fashion.  A  film was taken to identify the Left sacroiliac joint and the inferior most intersection of the anterior and posterior sacroiliac joint lines. The overlying skin and subcutaneous tissues were anesthetized using a 27-gauge 1-1/4 inch needle with 1% preservative-free lidocaine for a total volume of 1.5 mls. A 22-gauge 3.5-inch Quincke spinal needle was advanced into the Left sacroiliac joint space under intermittent fluoroscopic guidance. Needle position was confirmed on both AP and lateral views.     Then, after negative aspiration, 0.5mL Isovue-M contrast was  injected and confirmed adequate intraarticular spread. There was no evidence of intravascular uptake. At this point, after negative aspiration, a total of 2.5mL volume of treatment injectate, consisting of 1mL Kenalog (40mg/mL) and 1.5mL of 1% Lidocaine, was injected easily on the left.  The needle was then flushed with 0.5 ml of local anesthetic and removed. The needle insertion site was dressed appropriately.     Nadira Perez was taken to the recovery room where she was monitored for a brief period of time. She tolerated the procedure well and was discharged home in stable condition with post procedural instructions.     Before the procedure, she reported a pain score of 6/10.   After the procedure, she reported a pain score of 6/10 with difficulty distinguishing her right-sided pain given that is significantly more painful.      Follow-up will be in clinic     COMPLICATIONS: None    COMMENTS: Patient was scheduled for fluoroscopic guided bilateral sacroiliac joint corticosteroid injections today.  However, since our last clinic visit, she was referred to orthopedic spine surgery and following her consultation with Dr. Ramirez is scheduled for right MIS SI joint fusion 7/31/2025.  Given her upcoming surgery on the right side, we opted to defer treating the right SI joint today.  She will plan to reach out to her spine surgeon and see if treating the right SI joint with corticosteroid prior to the surgery would be okay, if so, we could work to schedule her.

## 2025-07-02 NOTE — DISCHARGE INSTRUCTIONS
PAIN INJECTION HOME CARE INSTRUCTIONS  Activity  -Rest today  -Do not work today  -Resume normal activity tomorrow  -DO NOT shower for 24 hours  -DO NOT remove bandaid for 24 hours    Pain  -You may experience soreness at the injection site for one or two days  -You may use an ice pack for 20 minutes every 2 hours for the first 24 hours  -You may use a heating pad after the first 24 hours  -You may use Tylenol (acetaminophen) every 4 hours or other pain medicines as directed by your physician    You may experience numbness radiating into your legs or arms (depending on the procedure location). This numbness may last several hours. Until sensation returns to normal; please use caution in walking, climbing stairs, and stepping out of your vehicle, etc.    DID YOU RECEIVE SEDATION TODAY?  No    DID YOU RECEIVE STEROIDS TODAY?  Yes    Common side effects of steroids:  Not everyone will experience corticosteroid side effects. If side effects are experienced, they will gradually subside in the 7-10 day period following an injection. Most common side effects include:  -Flushed face and/or chest  -Feeling of warmth, particularly in the face but could be an overall feeling of warmth  -Increased blood sugar in diabetic patients  -Sleep disturbances and/or mood swings are possible  -Leg cramps    Please contact us if you have:  -Severe pain  -Fever more than 101.5 degrees Fahrenheit  -Signs of infection at the injection site (redness, swelling, or drainage)      FOR PM&R PATIENTS: Dr. Saldivar  For patients seen by the PM and R service, please call [phone]. (Monday through Friday 8a-5p.  After business hours and weekends call [phone] and ask for the PM and R doctor on call). If you need to fax a pain diary to PM&R the fax number is [phone]. If you are unable to fax, uploading to [employer] is encouraged, then send to provider. If you have procedure scheduling questions please call [phone] Option #2.

## 2025-07-05 ENCOUNTER — HEALTH MAINTENANCE LETTER (OUTPATIENT)
Age: 75
End: 2025-07-05

## 2025-07-08 ENCOUNTER — VIRTUAL VISIT (OUTPATIENT)
Dept: ORTHOPEDICS | Facility: CLINIC | Age: 75
End: 2025-07-08
Payer: COMMERCIAL

## 2025-07-08 DIAGNOSIS — M53.3 SACROILIAC JOINT PAIN: Primary | ICD-10-CM

## 2025-07-08 PROCEDURE — 99207 PR NO BILLABLE SERVICE THIS VISIT: CPT | Performed by: ORTHOPAEDIC SURGERY

## 2025-07-08 NOTE — PROGRESS NOTES
Virtual Visit Details    Type of service:  Telephone Visit   Phone call duration: 10 minutes   Originating Location (pt. Location): Home    Distant Location (provider location):  On-site  Telephone visit completed due to provider preference     Diagnosis: SI Joint Dysfunction    I called Ismael today and verified the following for her insurance.    She has had a series of SI joint injections.  Each injection has initially provided roughly 100% relief for a short time.  The first injection lasted months.  Subsequent injections have had short term 100% relief for a few hour or days and then worn off more quickly than the first injection.  So she did have a positive diagnostic response to at least two injections, which satisfied this insurance requirement.  We will use SI Bone torque screws for the SI Fusion  She does have lumbar degenerative scoliosis.  However, the current surgery is planned for her SI joint dysfunction and the scoliosis is not currently symptomatic and is not a confounding condition or a reason to avoid the current surgery.  I answered the patient's remaining questions.  Per her insurance company request we will send this information  to fax [phone]    Serge Ramirez MD

## 2025-07-08 NOTE — LETTER
7/8/2025      Nadira Perez  [address]  [facility] [address]      Dear Colleague,    Thank you for referring your patient, Nadira Perez, to the [facility] [place]. Please see a copy of my visit note below.    Virtual Visit Details    Type of service:  Telephone Visit   Phone call duration: 10 minutes   Originating Location (pt. Location): Home    Distant Location (provider location):  On-site  Telephone visit completed due to provider preference     Diagnosis: SI Joint Dysfunction    I called Ismael today and verified the following for her insurance.    She has had a series of SI joint injections.  Each injection has initially provided roughly 100% relief for a short time.  The first injection lasted months.  Subsequent injections have had short term 100% relief for a few hour or days and then worn off more quickly than the first injection.  So she did have a positive diagnostic response to at least two injections, which satisfied this insurance requirement.  We will use SI Bone torque screws for the SI Fusion  She does have lumbar degenerative scoliosis.  However, the current surgery is planned for her SI joint dysfunction and the scoliosis is not currently symptomatic and is not a confounding condition or a reason to avoid the current surgery.  I answered the patient's remaining questions.  Per her insurance company request we will send this information  to fax [phone]    Serge Ramirez MD    Again, thank you for allowing me to participate in the care of your patient.        Sincerely,        Serge Ramirez MD    Electronically signed
car seat

## 2025-07-10 ENCOUNTER — ANCILLARY PROCEDURE (OUTPATIENT)
Dept: BONE DENSITY | Facility: CLINIC | Age: 75
End: 2025-07-10
Attending: NURSE PRACTITIONER
Payer: COMMERCIAL

## 2025-07-10 ENCOUNTER — LAB (OUTPATIENT)
Dept: LAB | Facility: CLINIC | Age: 75
End: 2025-07-10
Payer: COMMERCIAL

## 2025-07-10 DIAGNOSIS — Z78.0 POST-MENOPAUSE: ICD-10-CM

## 2025-07-10 DIAGNOSIS — E11.9 TYPE 2 DIABETES MELLITUS WITHOUT COMPLICATION, WITHOUT LONG-TERM CURRENT USE OF INSULIN (H): ICD-10-CM

## 2025-07-10 DIAGNOSIS — E11.9 TYPE 2 DIABETES MELLITUS WITHOUT COMPLICATION, WITHOUT LONG-TERM CURRENT USE OF INSULIN (H): Primary | ICD-10-CM

## 2025-07-10 DIAGNOSIS — E11.9 DIABETES MELLITUS, TYPE 2 (H): ICD-10-CM

## 2025-07-10 DIAGNOSIS — H35.372 EPIRETINAL MEMBRANE (ERM) OF LEFT EYE: Primary | ICD-10-CM

## 2025-07-10 DIAGNOSIS — H40.003 GLAUCOMA SUSPECT OF BOTH EYES: ICD-10-CM

## 2025-07-10 DIAGNOSIS — E78.2 MIXED HYPERLIPIDEMIA: ICD-10-CM

## 2025-07-10 LAB
CHOLEST SERPL-MCNC: 150 MG/DL
CREAT UR-MCNC: 44 MG/DL
EST. AVERAGE GLUCOSE BLD GHB EST-MCNC: 114 MG/DL
FASTING STATUS PATIENT QL REPORTED: NO
HBA1C MFR BLD: 5.6 %
HDLC SERPL-MCNC: 54 MG/DL
HOLD SPECIMEN: NORMAL
LDLC SERPL CALC-MCNC: 55 MG/DL
MICROALBUMIN UR-MCNC: 44.2 MG/L
MICROALBUMIN/CREAT UR: 100.45 MG/G CR (ref 0–25)
NONHDLC SERPL-MCNC: 96 MG/DL
TRIGL SERPL-MCNC: 203 MG/DL

## 2025-07-10 PROCEDURE — 82570 ASSAY OF URINE CREATININE: CPT | Performed by: NURSE PRACTITIONER

## 2025-07-10 PROCEDURE — 99000 SPECIMEN HANDLING OFFICE-LAB: CPT | Performed by: PATHOLOGY

## 2025-07-10 PROCEDURE — 77081 DXA BONE DENSITY APPENDICULR: CPT | Mod: XU | Performed by: INTERNAL MEDICINE

## 2025-07-10 PROCEDURE — 77080 DXA BONE DENSITY AXIAL: CPT | Performed by: INTERNAL MEDICINE

## 2025-07-10 PROCEDURE — 83036 HEMOGLOBIN GLYCOSYLATED A1C: CPT | Performed by: NURSE PRACTITIONER

## 2025-07-15 ENCOUNTER — MYC MEDICAL ADVICE (OUTPATIENT)
Dept: ANESTHESIOLOGY | Facility: CLINIC | Age: 75
End: 2025-07-15
Payer: COMMERCIAL

## 2025-07-16 ENCOUNTER — OFFICE VISIT (OUTPATIENT)
Dept: OPHTHALMOLOGY | Facility: CLINIC | Age: 75
End: 2025-07-16
Attending: OPHTHALMOLOGY
Payer: COMMERCIAL

## 2025-07-16 DIAGNOSIS — H43.812 PVD (POSTERIOR VITREOUS DETACHMENT), LEFT EYE: ICD-10-CM

## 2025-07-16 DIAGNOSIS — H52.13 MYOPIA OF BOTH EYES: ICD-10-CM

## 2025-07-16 DIAGNOSIS — Z96.1 PSEUDOPHAKIA OF BOTH EYES: ICD-10-CM

## 2025-07-16 DIAGNOSIS — H40.003 GLAUCOMA SUSPECT OF BOTH EYES: Primary | ICD-10-CM

## 2025-07-16 DIAGNOSIS — H35.372 EPIRETINAL MEMBRANE (ERM) OF LEFT EYE: ICD-10-CM

## 2025-07-16 PROCEDURE — 92014 COMPRE OPH EXAM EST PT 1/>: CPT | Mod: GC | Performed by: OPHTHALMOLOGY

## 2025-07-16 PROCEDURE — 92133 CPTRZD OPH DX IMG PST SGM ON: CPT | Performed by: OPHTHALMOLOGY

## 2025-07-16 PROCEDURE — G0463 HOSPITAL OUTPT CLINIC VISIT: HCPCS | Performed by: OPHTHALMOLOGY

## 2025-07-16 ASSESSMENT — REFRACTION_WEARINGRX
OD_ADD: +2.50
OS_ADD: +2.50
OD_CYLINDER: +0.75
OS_SPHERE: -1.50
OS_AXIS: 045
OD_ADD: +2.50
OD_AXIS: 178
OS_SPHERE: -0.75
OS_CYLINDER: +3.00
OS_AXIS: 046
OD_AXIS: 178
OD_SPHERE: +0.50
SPECS_TYPE: COMPUTER
OD_CYLINDER: +0.75
OS_ADD: +2.50
OS_CYLINDER: +3.25
OD_SPHERE: -0.25
SPECS_TYPE: PAL

## 2025-07-16 ASSESSMENT — CONF VISUAL FIELD
OD_SUPERIOR_NASAL_RESTRICTION: 0
OD_SUPERIOR_TEMPORAL_RESTRICTION: 0
OS_SUPERIOR_TEMPORAL_RESTRICTION: 0
OS_INFERIOR_TEMPORAL_RESTRICTION: 0
OD_INFERIOR_NASAL_RESTRICTION: 0
OS_NORMAL: 1
OD_NORMAL: 1
OD_INFERIOR_TEMPORAL_RESTRICTION: 0
OS_INFERIOR_NASAL_RESTRICTION: 0
METHOD: COUNTING FINGERS
OS_SUPERIOR_NASAL_RESTRICTION: 0

## 2025-07-16 ASSESSMENT — TONOMETRY
OS_IOP_MMHG: 19
OD_IOP_MMHG: 15
IOP_METHOD: TONOPEN

## 2025-07-16 ASSESSMENT — VISUAL ACUITY
METHOD: SNELLEN - LINEAR
OS_CC+: -2
OD_CC+: +2
OS_CC: 20/30
OD_CC: 20/40

## 2025-07-16 ASSESSMENT — SLIT LAMP EXAM - LIDS
COMMENTS: NORMAL
COMMENTS: NORMAL

## 2025-07-16 ASSESSMENT — EXTERNAL EXAM - LEFT EYE: OS_EXAM: NORMAL

## 2025-07-16 ASSESSMENT — EXTERNAL EXAM - RIGHT EYE: OD_EXAM: NORMAL

## 2025-07-16 NOTE — TELEPHONE ENCOUNTER
Gagan Gunter,    I've spoken to Ismael and moved her appointment for a date after her surgery.     Please let me know if I can help with anything else!    Thank you,  Mary Jo

## 2025-07-16 NOTE — PROGRESS NOTES
CC: s/p PPV left eye for floaters 6/20/2022    Interval: Ismael is here to continue care for an epiretinal membrane of left eye. Doesn't wear glasses because they're scratched but vision is stable OU. OS does have one small dark black floater that started a few months ago but not bothersome.    HPI: Nadira Perez 75 year old with hx of visually significantly floaters now s/p PPV OS for floaters (6/2022).     OCT 07/16/25   Right eye PHF attached; normal Foveal contour, few small drusen  Left eye stable stage 1 ERM; normal foveal contour    OCT RNFL 07/16/25   OD: WNL stable  OS: TI interval thinning but all still green      ASSESSMENT & PLAN    # S/p PPV+EL left eye  on 6/20/22 for floaters  - doing great: observe    # Large cup:disc ratio w/ possible temporal thinning OS  -  IOP 18/18  - pachy 598/537  - 07/16/25 RNFL with progression? OS IT; refer to glaucoma for evaluation in 3 months    # ERM left eye   - extrafoveal; stable; observe    #pseudophakia each eye  Observe; new MRx given    #History of RK, OU    RTC retina 1 year with radha: RENZOE MRX OCT mac, optos  RTC gluacoma 3 months     Salima Santamaria MD  Vitreoretinal Surgery Fellow     Complete documentation of historical and exam elements from today's encounter can be found in the full encounter summary report (not reduplicated in this progress note). I personally obtained the chief complaint(s) and history of present illness.  I confirmed and edited as necessary the review of systems, past medical/surgical history, family history, social history, and examination findings as documented by others; and I examined the patient myself. I personally reviewed the relevant tests, images, and reports as documented above. I formulated and edited as necessary the assessment and plan and discussed the findings and management plan with the patient and family.    Felix Oliveros MD, PhD

## 2025-07-16 NOTE — PROGRESS NOTES
Patient Contacted for the patient to call back and schedule the following:    Date: 8/12/25  Time: 3:00 PM  Appointment type: Return Pain  Provider: Renée Harris  Visit mode: Virtual Visit  Specialty phone number: [phone]    Additional Notes:

## 2025-07-17 ENCOUNTER — ANESTHESIA EVENT (OUTPATIENT)
Dept: SURGERY | Facility: CLINIC | Age: 75
End: 2025-07-17
Payer: COMMERCIAL

## 2025-07-17 ENCOUNTER — LAB (OUTPATIENT)
Dept: LAB | Facility: CLINIC | Age: 75
End: 2025-07-17
Payer: COMMERCIAL

## 2025-07-17 ENCOUNTER — PRE VISIT (OUTPATIENT)
Dept: SURGERY | Facility: CLINIC | Age: 75
End: 2025-07-17

## 2025-07-17 DIAGNOSIS — Z01.818 PRE-OP EVALUATION: ICD-10-CM

## 2025-07-17 LAB
ANION GAP SERPL CALCULATED.3IONS-SCNC: 15 MMOL/L (ref 7–15)
BUN SERPL-MCNC: 38.1 MG/DL (ref 8–23)
CALCIUM SERPL-MCNC: 10.2 MG/DL (ref 8.8–10.4)
CHLORIDE SERPL-SCNC: 101 MMOL/L (ref 98–107)
CREAT SERPL-MCNC: 0.74 MG/DL (ref 0.51–0.95)
EGFRCR SERPLBLD CKD-EPI 2021: 85 ML/MIN/1.73M2
ERYTHROCYTE [DISTWIDTH] IN BLOOD BY AUTOMATED COUNT: 14 % (ref 10–15)
GLUCOSE SERPL-MCNC: 118 MG/DL (ref 70–99)
HCO3 SERPL-SCNC: 26 MMOL/L (ref 22–29)
HCT VFR BLD AUTO: 37.1 % (ref 35–47)
HGB BLD-MCNC: 11.7 G/DL (ref 11.7–15.7)
MCH RBC QN AUTO: 28.5 PG (ref 26.5–33)
MCHC RBC AUTO-ENTMCNC: 31.5 G/DL (ref 31.5–36.5)
MCV RBC AUTO: 90 FL (ref 78–100)
PLATELET # BLD AUTO: 323 10E3/UL (ref 150–450)
POTASSIUM SERPL-SCNC: 2.9 MMOL/L (ref 3.4–5.3)
RBC # BLD AUTO: 4.11 10E6/UL (ref 3.8–5.2)
SODIUM SERPL-SCNC: 142 MMOL/L (ref 135–145)
WBC # BLD AUTO: 9.2 10E3/UL (ref 4–11)

## 2025-07-22 ASSESSMENT — ENCOUNTER SYMPTOMS
ORTHOPNEA: 0
DYSRHYTHMIAS: 1

## 2025-07-22 NOTE — ANESTHESIA PREPROCEDURE EVALUATION
Anesthesia Pre-Procedure Evaluation    Patient: Nadira Perez   MRN: [id number] : 1950          Procedure : Procedure(s):  Right Minimally Invasive FUSION, SACROILIAC JOINT, USING OPTICAL TRACKING SYSTEM, use of SI Bone Torque implants         Past Medical History:   Diagnosis Date    Anemia Off and on from years back    Arthritis     Bone disease     Breast cancer (H)     Cataract     Chest pain Since 26 sternal fracture (secondary to fall), pain level started high, then decreased to moderate two weeks later; then to mild to none since then.  Sneezing and coughing bring it up again to a moderate level.    Chronic osteoarthritis Many years    Diabetes (H)     Difficult intubation     Use Peds #5 due to throat kyphoplasty    Gastroesophageal reflux disease     H/O kyphoplasty     Hearing problem     Hepatitis     Monohepatitis    History of blood transfusion     History of kidney stones     History of radiation therapy     Hyperlipemia     Hyperlipidemia Recent concern with elevated results (been on statins for years).    Hypertension     Hypopotassemia     Irregular heart beat     Kidney problem     Lymph edema     Medullary sponge kidney     Motion sickness     Balance issues    Osteopenia     PONV (postoperative nausea and vomiting)     Reduced vision     Scoliosis Sceondary to advanced osteoporosis.    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    Thyroid disease       Past Surgical History:   Procedure Laterality Date    ABDOMEN SURGERY      ovarian cyst, mesh    ARTHRODESIS WRIST Right     ARTHRODESIS WRIST  2013    Procedure: ARTHRODESIS WRIST;  left wrist scaphoid excision, four bone fusion, iliac crest bone graft  ( Mac with block);  Surgeon: Av Mendez MD;  [facility]    BIOPSY       skin, vaginal    BLEPHAROPLASTY BILATERAL Bilateral 05/06/2022    Procedure: UPPER BLEPHAROPLASTY, BILATERAL;  Surgeon: Jemal Sanchez MD;  Location: [facility] OR    CATARACT IOL, RT/LT Right 03/13/2018    CATARACT IOL, RT/LT Left 02/20/2018    COLONOSCOPY  12/24/2013    Procedure: COMBINED COLONOSCOPY, SINGLE BIOPSY/POLYPECTOMY BY BIOPSY;  COLONOSCOPY;  Surgeon: Dom Alvarez MD;  Location:  GI    COLONOSCOPY N/A 03/12/2024    Procedure: COLONOSCOPY, FLEXIBLE, WITH LESION REMOVAL USING SNARE;  Surgeon: Amina Espinoza MD;  Location: [facility]    COSMETIC BLEPHAROPLASTY LOWER LIDS BILATERAL Bilateral 05/06/2022    Procedure: BLEPHAROPLASTY, LOWER EYELID, BILATERAL, COSMETIC;  Surgeon: Jemal Sanchez MD;  Location: [facility]    COSMETIC SURGERY      ESOPHAGOSCOPY, GASTROSCOPY, DUODENOSCOPY (EGD), COMBINED N/A 11/23/2016    Procedure: COMBINED ESOPHAGOSCOPY, GASTROSCOPY, DUODENOSCOPY (EGD);  Surgeon: Quinten Feliciano MD;  Location: [facility]    ESOPHAGOSCOPY, GASTROSCOPY, DUODENOSCOPY (EGD), COMBINED N/A 03/12/2024    Procedure: ESOPHAGOGASTRODUODENOSCOPY, WITH BIOPSY;  Surgeon: Amina Espinoza MD;  Location: [facility]    EXTERNAL EAR SURGERY      right    EYE SURGERY      radial keratomy    FUSION, SPINE, INTERBODY, OBLIQUE ANTERIOR AND LUMBAR, 2 LEVELS, POST APPROACH, USING OTS N/A 11/18/2021    Procedure: Part 1: Oblique Anterior Interbody Fusion at Lumbar 5 to sacral 1 with use of Bone Morphogenic Protein,;  Surgeon: Serge Ramirez MD;  [facility]    GI SURGERY      Umbilical hernia repair    GRAFT BONE FROM ILIAC CREST  02/14/2013    Procedure: GRAFT BONE FROM ILIAC CREST;  mac with block and local infilitration;  Surgeon: Av Mendez MD;  [facility]     BREATH HYDROGEN TEST N/A 10/14/2016    Procedure: HYDROGEN BREATH TEST;  Surgeon: Cheri aBrron MD;  Location:  GI    HERNIA REPAIR      umbilical age 18 mos.    HYSTERECTOMY      HYSTERECTOMY TOTAL ABDOMINAL   05/03/2000    INJECT EPIDURAL CAUDAL N/A 09/12/2023    Procedure: Caudal epidural steroid injection with fluoroscopy;  Surgeon: Natasha Umaña MD;  Location: [facility] OR    INJECT EPIDURAL CAUDAL N/A 01/02/2024    Procedure: INJECTION, EPIDURAL, CAUDAL;  Surgeon: Natasha Umaña MD;  Location: [facility] OR    INJECT EPIDURAL LUMBAR N/A 05/23/2023    Procedure: L3-4 interlaminar epidural steroid injection with fluoroscopy ( left> right);  Surgeon: Natasha Umaña MD;  Location: [facility] OR    INJECT JOINT SACROILIAC Left 04/27/2023    Procedure: Left sacroiliac joint steroid injection with fluoroscopy;  Surgeon: Natasha Umaña MD;  Location: [facility] OR    INJECT JOINT SACROILIAC Bilateral 10/02/2024    Procedure: Bilateral sacroiliac joint injection;  Surgeon: Thais Saldivar MD;  Location: [facility] OR    INJECT JOINT SACROILIAC Left 7/2/2025    Procedure: Repeat sacroiliac joint injection;  Surgeon: Thais Saldivar MD;  Location: [facility] OR    INJECT SACROILIAC JOINT Bilateral 02/26/2025    Procedure: Bilateral sacroiliac joint steroid injections;  Surgeon: Thais Saldivar MD;  Location: [facility] OR    LAPAROSCOPY DIAGNOSTIC (GENERAL) N/A 05/04/2025    Procedure: LAPAROSCOPY, DIAGNOSTIC with lysis of adhesions, converted to;  Surgeon: Jacobo Cintron DO;  [facility]: [facility]    MASTECTOMY MODIFIED RADICAL Bilateral     bilateral; right breast prophylactic    OOPHOROPEXY      OPTICAL TRACKING SYSTEM FUSION POSTERIOR SPINE LUMBAR N/A 11/18/2021    Procedure: Open Posterior Instrumented Spinal Fusion at Lumbar 5 to Sacral 1, with grimm aguayo osteotomy, use of O-Arm/Stealth;  Surgeon: Serge Ramirez MD;  [facility]    OTOPLASTY Bilateral 07/25/2024    Procedure: Right Revision Functional Otoplasty;  Surgeon: Juan Agudelo MD;  Location: [facility] OR    OTOPLASTY Right 6/4/2025    Procedure: Right revision functional otoplasty;  Surgeon: Juan Agudelo MD;  Location: [facility] OR    PARATHYROIDECTOMY  09/23/2004  "   R SUPERIOR    PARATHYROIDECTOMY  09/23/2004    parathyroid resection, subtotal    PICC INSERTION - DOUBLE LUMEN Right 05/08/2025    38-1cm, Basilic vein    OR HAND/FINGER SURGERY UNLISTED  Bilateral partial wrist fusions [id number]    OR SPINE SURGERY PROCEDURE UNLISTED  December 2021    OR STOMACH SURGERY PROCEDURE UNLISTED  Several throughout the years    RELEASE CARPAL TUNNEL Right 12/02/2021    Procedure: RIGHT CARPAL TUNNEL RELEASE, RIGHT WRIST HARDWARE REMOVAL, RIGHT CARPAL BOSS EXCISION;  Surgeon: Rolan Conley MD;  Location: [facility] OR    REMOVE HARDWARE HAND  09/24/2013    Procedure: REMOVE HARDWARE HAND;  Left Hand Screw Removal       RESECT SMALL BOWEL WITHOUT OSTOMY N/A 05/04/2025    Procedure: open laparotomy with small bowel resection x1;  Surgeon: Jacobo Cintron DO;  Location: [facility]    RHINOPLASTY  1968    thyr proc skin closed cosmetic manner by subcuticular stitch  01/23/2009    THYROPLASTY  10/09/2009    TONSILLECTOMY  1977    VITRECTOMY PARSPLANA WITH 25 GAUGE SYSTEM Left 06/20/2022    Procedure: LEFT EYE VITRECTOMY, PARS PLANA APPROACH, USING 25-GAUGE INSTRUMENTS, laser;  Surgeon: Felix Carlos MD;  Location: [facility] OR    WRIST SURGERY      wrist arthrodesis      Allergies   Allergen Reactions    Erythromycin Nausea    Penicillin G     Penicillins Hives     Around age 6 - doesn't recall full reaction, mother told her it was hives.     Has tolerated cephalsporins.       Social History     Tobacco Use    Smoking status: Never     Passive exposure: Never    Smokeless tobacco: Never   Substance Use Topics    Alcohol use: Not Currently     Comment: Rare if ever      Wt Readings from Last 1 Encounters:   07/17/25 64.9 kg (143 lb)        Anesthesia Evaluation   Pt has had prior anesthetic. Type: General.    History of anesthetic complications  - difficult airway and PONV.  hx failed MAC.  see anesthesia record on 6/4/25 regarding difficult intubation.  \"Also needs hyperangle VL " "blade for intubation. 6.5 ETT\".    ROS/MED HX  ENT/Pulmonary: Comment: Wears bilateral hearing aids.     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 migraines,                          Cardiovascular: Comment: History of stress cardiomyopathy 2/16/24 - recovered      History of likely old MI - ischemic area noted - cardiology noted cardiac cath not indicated    (+) Dyslipidemia hypertension- -   -  - -                        dysrhythmias, PVCs,        Previous cardiac testing   Echo: Date: 4/2024 Results:  Interpretation Summary     Left ventricular function is decreased. The ejection fraction is 50-55%  (borderline). Mid inferolateral akinesis is present.  Global right ventricular function is normal.  No pericardial effusion is present.  Stress Test:  Date: Results:    ECG Reviewed:  Date: 6/5/25 Results:  Sinus rhythm with sinus arrhythmia with occasional Premature ventricular complexes  Left axis deviation  Abnormal ECG  Cath:  Date: Results:   (-) DUEÑAS and orthopnea/PND   METS/Exercise Tolerance: 1 - Eating, dressing Comment: Very limited physical activity due to back pain.  Denies angina or DUEÑAS with ADLs.   Hematologic:     (+)      anemia,       (-) history of blood transfusion   Musculoskeletal: Comment: Right SI joint dysfunction > left SI joint dysfunction  Chronic low back pain    Uses a wheeled walker at home       GI/Hepatic: Comment: S/p RLQ hernia repair    (+) GERD (hasn't started. hyatial hernia. no symptoms),    hepatitis resolved hiatal hernia,     hepatitis (hx mono related hepatitis) type Other,        Renal/Genitourinary: Comment: Medullary sponge kidney, right      Albuminuria -primary care provider following      Endo: Comment: Diabetes in " chart--A1C 6.3-6.4 last year. Most recent A1C 5.6 after weight loss.    (+)   Last HgA1c: 5.6, date: 7/10/25, Not using insulin, - not using insulin pump.    thyroid problem, hypothyroidism,           Psychiatric/Substance Use:     (+)    H/O chronic opioid use . Recreational drug usage: Cannabis.    Infectious Disease:  - neg infectious disease ROS     Malignancy: Comment: History of vulvar SCC  (+) Malignancy, History of Skin and Breast.Breast CA Remission status post Surgery, Chemo and Radiation.  Skin CA Remission status post Surgery.      Other:  - neg other ROS    (+)  , H/O Chronic Pain,           Physical Exam  Airway  Mallampati: I  TM distance: >3 FB  Neck ROM: full    Cardiovascular   Rhythm: regular  Rate: normal rate   Comments: History of stress cardiomyopathy 2/16/24 - recovered      History of likely old MI - ischemic area noted - cardiology noted cardiac cath not indicated  Dental   (+) Minor Abnormalities - some fillings, tiny chips      Pulmonary - normal examBreath sounds clear to auscultation        Neurological   She appears awake and alert.    Other Findings       OUTSIDE LABS:  CBC:   Lab Results   Component Value Date    WBC 9.2 07/17/2025    WBC 8.5 05/29/2025    HGB 11.7 07/17/2025    HGB 9.7 (L) 05/29/2025    HCT 37.1 07/17/2025    HCT 30.8 (L) 05/29/2025     07/17/2025     05/29/2025     BMP:   Lab Results   Component Value Date     07/17/2025     05/29/2025    POTASSIUM 2.9 (L) 07/17/2025    POTASSIUM 3.9 05/29/2025    CHLORIDE 101 07/17/2025    CHLORIDE 104 05/29/2025    CO2 26 07/17/2025    CO2 25 05/29/2025    BUN 38.1 (H) 07/17/2025    BUN 26.9 (H) 05/29/2025    CR 0.74 07/17/2025    CR 0.90 05/29/2025     (H) 07/17/2025     (H) 06/04/2025     COAGS:   Lab Results   Component Value Date    PTT 28 05/04/2025    INR 1.09 05/04/2025     POC:   Lab Results   Component Value Date    BGM 98 01/16/2008     HEPATIC:   Lab Results   Component Value  Date    ALBUMIN 3.8 05/29/2025    PROTTOTAL 6.0 (L) 05/29/2025    ALT 12 05/29/2025    AST 23 05/29/2025    ALKPHOS 109 05/29/2025    BILITOTAL 0.4 05/29/2025     OTHER:   Lab Results   Component Value Date    PH 7.39 11/18/2021    LACT 1.5 05/04/2025    A1C 5.6 07/10/2025    RAMBO 10.2 07/17/2025    PHOS 2.4 (L) 05/13/2025    MAG 1.9 05/13/2025    LIPASE 177 09/15/2016    TSH 1.54 03/14/2025    T4 1.05 11/21/2011    CRP <2.9 12/08/2021    SED 31 (H) 12/08/2021       Anesthesia Plan    ASA Status:  3      NPO Status: NPO Appropriate   Anesthesia Type: General.  Airway: oral.  Induction: intravenous.  Maintenance: Balanced.   Techniques and Equipment:     - Airway:  Planned airway equipment includes video laryngoscope.     - Monitoring Plan: standard ASA monitoring, processed EEG monitor  Equipment Comments: Glidescope hyperangulated      Consents    Anesthesia Plan(s) and associated risks, benefits, and realistic alternatives discussed. Questions answered and patient/representative(s) expressed understanding.     - Discussed: CRNA     - Discussed with:  Patient               Postoperative Care    Pain management: non-narcotic analgesics, plan for postoperative opioid use, multimodal analgesia.     Comments:    Other Comments: On gabapentin, robaxin, oxycodone (5-10mg--3 tabs/day), mj, acetaminophen  Assess GERD day of procedure--has gotten reglan in the past    Discussed risks of anesthesia including nausea, vomiting, sore throat, dental damage, cardiopulmonary complications, agitation, neurologic complications, and serious complications.               Renée Jesus MD    I have reviewed the pertinent notes and labs in the chart from the past 30 days and (re)examined the patient.  Any updates or changes from those notes are reflected in this note.    Clinically Significant Risk Factors Present on Admission                   # Hypertension: Noted on problem list               # Financial/Environmental Concerns:

## 2025-07-23 ENCOUNTER — ANESTHESIA (OUTPATIENT)
Dept: SURGERY | Facility: CLINIC | Age: 75
End: 2025-07-23
Payer: COMMERCIAL

## 2025-07-23 ENCOUNTER — HOSPITAL ENCOUNTER (INPATIENT)
Facility: CLINIC | Age: 75
DRG: 451 | End: 2025-07-23
Attending: ORTHOPAEDIC SURGERY | Admitting: ORTHOPAEDIC SURGERY
Payer: COMMERCIAL

## 2025-07-23 ENCOUNTER — APPOINTMENT (OUTPATIENT)
Dept: GENERAL RADIOLOGY | Facility: CLINIC | Age: 75
DRG: 451 | End: 2025-07-23
Attending: ORTHOPAEDIC SURGERY
Payer: COMMERCIAL

## 2025-07-23 DIAGNOSIS — M53.3 SACROILIAC JOINT PAIN: Primary | ICD-10-CM

## 2025-07-23 DIAGNOSIS — M53.3 BACK PAIN, SACROILIAC: ICD-10-CM

## 2025-07-23 LAB
ABO + RH BLD: NORMAL
ANION GAP SERPL CALCULATED.3IONS-SCNC: 18 MMOL/L (ref 7–15)
BLD GP AB SCN SERPL QL: NEGATIVE
BUN SERPL-MCNC: 28 MG/DL (ref 8–23)
CALCIUM SERPL-MCNC: 9.1 MG/DL (ref 8.8–10.4)
CHLORIDE SERPL-SCNC: 99 MMOL/L (ref 98–107)
CREAT SERPL-MCNC: 0.66 MG/DL (ref 0.51–0.95)
EGFRCR SERPLBLD CKD-EPI 2021: >90 ML/MIN/1.73M2
GLUCOSE BLDC GLUCOMTR-MCNC: 101 MG/DL (ref 70–99)
GLUCOSE BLDC GLUCOMTR-MCNC: 93 MG/DL (ref 70–99)
GLUCOSE SERPL-MCNC: 153 MG/DL (ref 70–99)
HCO3 SERPL-SCNC: 23 MMOL/L (ref 22–29)
HOLD SPECIMEN: NORMAL
POTASSIUM SERPL-SCNC: 3.2 MMOL/L (ref 3.4–5.3)
POTASSIUM SERPL-SCNC: 3.4 MMOL/L (ref 3.4–5.3)
SODIUM SERPL-SCNC: 140 MMOL/L (ref 135–145)
SPECIMEN EXP DATE BLD: NORMAL

## 2025-07-23 PROCEDURE — 250N000013 HC RX MED GY IP 250 OP 250 PS 637: Performed by: ORTHOPAEDIC SURGERY

## 2025-07-23 PROCEDURE — 84132 ASSAY OF SERUM POTASSIUM: CPT | Performed by: STUDENT IN AN ORGANIZED HEALTH CARE EDUCATION/TRAINING PROGRAM

## 2025-07-23 PROCEDURE — 258N000003 HC RX IP 258 OP 636: Performed by: NURSE ANESTHETIST, CERTIFIED REGISTERED

## 2025-07-23 PROCEDURE — 999N000127 HC STATISTIC PERIPHERAL IV START W US GUIDANCE

## 2025-07-23 PROCEDURE — 82435 ASSAY OF BLOOD CHLORIDE: CPT | Performed by: PHYSICIAN ASSISTANT

## 2025-07-23 PROCEDURE — 370N000017 HC ANESTHESIA TECHNICAL FEE, PER MIN: Performed by: ORTHOPAEDIC SURGERY

## 2025-07-23 PROCEDURE — 360N000086 HC SURGERY LEVEL 6 W/ FLUORO, PER MIN: Performed by: ORTHOPAEDIC SURGERY

## 2025-07-23 PROCEDURE — 250N000013 HC RX MED GY IP 250 OP 250 PS 637: Performed by: PHYSICIAN ASSISTANT

## 2025-07-23 PROCEDURE — 250N000011 HC RX IP 250 OP 636: Performed by: STUDENT IN AN ORGANIZED HEALTH CARE EDUCATION/TRAINING PROGRAM

## 2025-07-23 PROCEDURE — C1889 IMPLANT/INSERT DEVICE, NOC: HCPCS | Performed by: ORTHOPAEDIC SURGERY

## 2025-07-23 PROCEDURE — 710N000010 HC RECOVERY PHASE 1, LEVEL 2, PER MIN: Performed by: ORTHOPAEDIC SURGERY

## 2025-07-23 PROCEDURE — 250N000009 HC RX 250: Performed by: NURSE ANESTHETIST, CERTIFIED REGISTERED

## 2025-07-23 PROCEDURE — 99222 1ST HOSP IP/OBS MODERATE 55: CPT | Performed by: PHYSICIAN ASSISTANT

## 2025-07-23 PROCEDURE — 999N000141 HC STATISTIC PRE-PROCEDURE NURSING ASSESSMENT: Performed by: ORTHOPAEDIC SURGERY

## 2025-07-23 PROCEDURE — 272N000002 HC OR SUPPLY OTHER OPNP: Performed by: ORTHOPAEDIC SURGERY

## 2025-07-23 PROCEDURE — 250N000013 HC RX MED GY IP 250 OP 250 PS 637: Performed by: STUDENT IN AN ORGANIZED HEALTH CARE EDUCATION/TRAINING PROGRAM

## 2025-07-23 PROCEDURE — 36415 COLL VENOUS BLD VENIPUNCTURE: CPT | Performed by: PHYSICIAN ASSISTANT

## 2025-07-23 PROCEDURE — 250N000011 HC RX IP 250 OP 636: Performed by: NURSE ANESTHETIST, CERTIFIED REGISTERED

## 2025-07-23 PROCEDURE — 258N000003 HC RX IP 258 OP 636: Performed by: STUDENT IN AN ORGANIZED HEALTH CARE EDUCATION/TRAINING PROGRAM

## 2025-07-23 PROCEDURE — 250N000009 HC RX 250: Performed by: ORTHOPAEDIC SURGERY

## 2025-07-23 PROCEDURE — 86850 RBC ANTIBODY SCREEN: CPT | Performed by: STUDENT IN AN ORGANIZED HEALTH CARE EDUCATION/TRAINING PROGRAM

## 2025-07-23 PROCEDURE — 999N000285 HC STATISTIC VASC ACCESS LAB DRAW WITH PIV START

## 2025-07-23 PROCEDURE — 120N000002 HC R&B MED SURG/OB UMMC

## 2025-07-23 PROCEDURE — 0SG704Z FUSION OF RIGHT SACROILIAC JOINT WITH INTERNAL FIXATION DEVICE, OPEN APPROACH: ICD-10-PCS | Performed by: ORTHOPAEDIC SURGERY

## 2025-07-23 PROCEDURE — 8E0WXBZ COMPUTER ASSISTED PROCEDURE OF TRUNK REGION: ICD-10-PCS | Performed by: ORTHOPAEDIC SURGERY

## 2025-07-23 PROCEDURE — 272N000001 HC OR GENERAL SUPPLY STERILE: Performed by: ORTHOPAEDIC SURGERY

## 2025-07-23 PROCEDURE — 250N000025 HC SEVOFLURANE, PER MIN: Performed by: ORTHOPAEDIC SURGERY

## 2025-07-23 PROCEDURE — 250N000011 HC RX IP 250 OP 636: Performed by: PHYSICIAN ASSISTANT

## 2025-07-23 PROCEDURE — 999N000182 XR SURGERY OARM

## 2025-07-23 DEVICE — IMPLANTABLE DEVICE: Type: IMPLANTABLE DEVICE | Site: SACRUM | Status: FUNCTIONAL

## 2025-07-23 RX ORDER — POLYETHYLENE GLYCOL 3350 17 G/17G
17 POWDER, FOR SOLUTION ORAL DAILY
Status: DISCONTINUED | OUTPATIENT
Start: 2025-07-24 | End: 2025-07-25 | Stop reason: HOSPADM

## 2025-07-23 RX ORDER — OXYCODONE HYDROCHLORIDE 5 MG/1
5 TABLET ORAL EVERY 4 HOURS PRN
Status: DISCONTINUED | OUTPATIENT
Start: 2025-07-23 | End: 2025-07-24

## 2025-07-23 RX ORDER — GABAPENTIN 100 MG/1
100 CAPSULE ORAL
Status: DISCONTINUED | OUTPATIENT
Start: 2025-07-23 | End: 2025-07-23 | Stop reason: HOSPADM

## 2025-07-23 RX ORDER — ONDANSETRON 4 MG/1
4 TABLET, ORALLY DISINTEGRATING ORAL EVERY 30 MIN PRN
Status: DISCONTINUED | OUTPATIENT
Start: 2025-07-23 | End: 2025-07-23 | Stop reason: HOSPADM

## 2025-07-23 RX ORDER — DEXAMETHASONE SODIUM PHOSPHATE 4 MG/ML
INJECTION, SOLUTION INTRA-ARTICULAR; INTRALESIONAL; INTRAMUSCULAR; INTRAVENOUS; SOFT TISSUE PRN
Status: DISCONTINUED | OUTPATIENT
Start: 2025-07-23 | End: 2025-07-23

## 2025-07-23 RX ORDER — ONDANSETRON 4 MG/1
4 TABLET, ORALLY DISINTEGRATING ORAL EVERY 6 HOURS PRN
Status: DISCONTINUED | OUTPATIENT
Start: 2025-07-23 | End: 2025-07-25 | Stop reason: HOSPADM

## 2025-07-23 RX ORDER — FENTANYL CITRATE 50 UG/ML
50 INJECTION, SOLUTION INTRAMUSCULAR; INTRAVENOUS EVERY 5 MIN PRN
Status: DISCONTINUED | OUTPATIENT
Start: 2025-07-23 | End: 2025-07-23 | Stop reason: HOSPADM

## 2025-07-23 RX ORDER — ACETAMINOPHEN 500 MG
1000 TABLET ORAL 3 TIMES DAILY
Status: DISCONTINUED | OUTPATIENT
Start: 2025-07-23 | End: 2025-07-25 | Stop reason: HOSPADM

## 2025-07-23 RX ORDER — SODIUM CHLORIDE, SODIUM LACTATE, POTASSIUM CHLORIDE, CALCIUM CHLORIDE 600; 310; 30; 20 MG/100ML; MG/100ML; MG/100ML; MG/100ML
INJECTION, SOLUTION INTRAVENOUS CONTINUOUS
Status: DISCONTINUED | OUTPATIENT
Start: 2025-07-23 | End: 2025-07-23 | Stop reason: HOSPADM

## 2025-07-23 RX ORDER — ONDANSETRON 2 MG/ML
INJECTION INTRAMUSCULAR; INTRAVENOUS PRN
Status: DISCONTINUED | OUTPATIENT
Start: 2025-07-23 | End: 2025-07-23

## 2025-07-23 RX ORDER — FENTANYL CITRATE 50 UG/ML
25 INJECTION, SOLUTION INTRAMUSCULAR; INTRAVENOUS EVERY 5 MIN PRN
Status: DISCONTINUED | OUTPATIENT
Start: 2025-07-23 | End: 2025-07-23 | Stop reason: HOSPADM

## 2025-07-23 RX ORDER — NALOXONE HYDROCHLORIDE 0.4 MG/ML
0.1 INJECTION, SOLUTION INTRAMUSCULAR; INTRAVENOUS; SUBCUTANEOUS
Status: DISCONTINUED | OUTPATIENT
Start: 2025-07-23 | End: 2025-07-23 | Stop reason: HOSPADM

## 2025-07-23 RX ORDER — CEFAZOLIN SODIUM/WATER 2 G/20 ML
2 SYRINGE (ML) INTRAVENOUS
Status: COMPLETED | OUTPATIENT
Start: 2025-07-23 | End: 2025-07-23

## 2025-07-23 RX ORDER — ACETAMINOPHEN 325 MG/1
975 TABLET ORAL EVERY 8 HOURS
Status: DISCONTINUED | OUTPATIENT
Start: 2025-07-23 | End: 2025-07-23

## 2025-07-23 RX ORDER — LEVOTHYROXINE SODIUM 112 UG/1
56 TABLET ORAL EVERY MORNING
Status: DISCONTINUED | OUTPATIENT
Start: 2025-07-24 | End: 2025-07-25 | Stop reason: HOSPADM

## 2025-07-23 RX ORDER — ACETAMINOPHEN 325 MG/1
975 TABLET ORAL
Status: DISCONTINUED | OUTPATIENT
Start: 2025-07-23 | End: 2025-07-23 | Stop reason: HOSPADM

## 2025-07-23 RX ORDER — MAGNESIUM GLYCINATE 100 MG
800 CAPSULE ORAL AT BEDTIME
Status: DISCONTINUED | OUTPATIENT
Start: 2025-07-23 | End: 2025-07-25 | Stop reason: HOSPADM

## 2025-07-23 RX ORDER — HYDROCHLOROTHIAZIDE 25 MG/1
25 TABLET ORAL EVERY MORNING
Status: DISCONTINUED | OUTPATIENT
Start: 2025-07-24 | End: 2025-07-25 | Stop reason: HOSPADM

## 2025-07-23 RX ORDER — LISINOPRIL 20 MG/1
20 TABLET ORAL 2 TIMES DAILY
Status: DISCONTINUED | OUTPATIENT
Start: 2025-07-23 | End: 2025-07-25 | Stop reason: HOSPADM

## 2025-07-23 RX ORDER — AMOXICILLIN 250 MG
1 CAPSULE ORAL 2 TIMES DAILY
Status: DISCONTINUED | OUTPATIENT
Start: 2025-07-23 | End: 2025-07-25 | Stop reason: HOSPADM

## 2025-07-23 RX ORDER — ACETAMINOPHEN 325 MG/1
975 TABLET ORAL ONCE
Status: DISCONTINUED | OUTPATIENT
Start: 2025-07-23 | End: 2025-07-23 | Stop reason: HOSPADM

## 2025-07-23 RX ORDER — LIDOCAINE HYDROCHLORIDE 20 MG/ML
INJECTION, SOLUTION INFILTRATION; PERINEURAL PRN
Status: DISCONTINUED | OUTPATIENT
Start: 2025-07-23 | End: 2025-07-23

## 2025-07-23 RX ORDER — BISACODYL 10 MG
10 SUPPOSITORY, RECTAL RECTAL DAILY PRN
Status: DISCONTINUED | OUTPATIENT
Start: 2025-07-26 | End: 2025-07-25 | Stop reason: HOSPADM

## 2025-07-23 RX ORDER — ONDANSETRON 2 MG/ML
4 INJECTION INTRAMUSCULAR; INTRAVENOUS EVERY 6 HOURS PRN
Status: DISCONTINUED | OUTPATIENT
Start: 2025-07-23 | End: 2025-07-25 | Stop reason: HOSPADM

## 2025-07-23 RX ORDER — METHOCARBAMOL 500 MG/1
500 TABLET, FILM COATED ORAL EVERY 8 HOURS PRN
Status: DISCONTINUED | OUTPATIENT
Start: 2025-07-23 | End: 2025-07-24

## 2025-07-23 RX ORDER — POTASSIUM CHLORIDE 1500 MG/1
20 TABLET, EXTENDED RELEASE ORAL DAILY
Status: DISCONTINUED | OUTPATIENT
Start: 2025-07-23 | End: 2025-07-25 | Stop reason: HOSPADM

## 2025-07-23 RX ORDER — SODIUM CHLORIDE 9 MG/ML
INJECTION, SOLUTION INTRAVENOUS CONTINUOUS
Status: DISCONTINUED | OUTPATIENT
Start: 2025-07-23 | End: 2025-07-25 | Stop reason: HOSPADM

## 2025-07-23 RX ORDER — DEXAMETHASONE SODIUM PHOSPHATE 4 MG/ML
4 INJECTION, SOLUTION INTRA-ARTICULAR; INTRALESIONAL; INTRAMUSCULAR; INTRAVENOUS; SOFT TISSUE
Status: DISCONTINUED | OUTPATIENT
Start: 2025-07-23 | End: 2025-07-23 | Stop reason: HOSPADM

## 2025-07-23 RX ORDER — GABAPENTIN 600 MG/1
1200 TABLET ORAL ONCE
Status: COMPLETED | OUTPATIENT
Start: 2025-07-23 | End: 2025-07-23

## 2025-07-23 RX ORDER — GABAPENTIN 400 MG/1
1200 CAPSULE ORAL 3 TIMES DAILY
Status: DISCONTINUED | OUTPATIENT
Start: 2025-07-23 | End: 2025-07-25 | Stop reason: HOSPADM

## 2025-07-23 RX ORDER — BUPIVACAINE HCL/EPINEPHRINE 0.25-.0005
VIAL (ML) INJECTION PRN
Status: DISCONTINUED | OUTPATIENT
Start: 2025-07-23 | End: 2025-07-23 | Stop reason: HOSPADM

## 2025-07-23 RX ORDER — HYDROMORPHONE HYDROCHLORIDE 1 MG/ML
0.4 INJECTION, SOLUTION INTRAMUSCULAR; INTRAVENOUS; SUBCUTANEOUS EVERY 5 MIN PRN
Status: DISCONTINUED | OUTPATIENT
Start: 2025-07-23 | End: 2025-07-23 | Stop reason: HOSPADM

## 2025-07-23 RX ORDER — PROCHLORPERAZINE MALEATE 5 MG/1
5 TABLET ORAL EVERY 6 HOURS PRN
Status: DISCONTINUED | OUTPATIENT
Start: 2025-07-23 | End: 2025-07-25 | Stop reason: HOSPADM

## 2025-07-23 RX ORDER — PROPOFOL 10 MG/ML
INJECTION, EMULSION INTRAVENOUS CONTINUOUS PRN
Status: DISCONTINUED | OUTPATIENT
Start: 2025-07-23 | End: 2025-07-23

## 2025-07-23 RX ORDER — HYDROMORPHONE HYDROCHLORIDE 1 MG/ML
0.2 INJECTION, SOLUTION INTRAMUSCULAR; INTRAVENOUS; SUBCUTANEOUS
Status: DISCONTINUED | OUTPATIENT
Start: 2025-07-23 | End: 2025-07-25 | Stop reason: HOSPADM

## 2025-07-23 RX ORDER — CEFAZOLIN SODIUM 2 G/50ML
2 SOLUTION INTRAVENOUS EVERY 8 HOURS
Status: COMPLETED | OUTPATIENT
Start: 2025-07-23 | End: 2025-07-24

## 2025-07-23 RX ORDER — SPIRONOLACTONE 25 MG/1
25 TABLET ORAL DAILY
Status: DISCONTINUED | OUTPATIENT
Start: 2025-07-23 | End: 2025-07-25 | Stop reason: HOSPADM

## 2025-07-23 RX ORDER — CARVEDILOL 12.5 MG/1
12.5 TABLET ORAL ONCE
Status: COMPLETED | OUTPATIENT
Start: 2025-07-23 | End: 2025-07-23

## 2025-07-23 RX ORDER — OXYCODONE HYDROCHLORIDE 5 MG/1
5 TABLET ORAL
Refills: 0 | Status: COMPLETED | OUTPATIENT
Start: 2025-07-23 | End: 2025-07-23

## 2025-07-23 RX ORDER — LIDOCAINE 40 MG/G
CREAM TOPICAL
Status: DISCONTINUED | OUTPATIENT
Start: 2025-07-23 | End: 2025-07-23 | Stop reason: HOSPADM

## 2025-07-23 RX ORDER — PROPOFOL 10 MG/ML
INJECTION, EMULSION INTRAVENOUS PRN
Status: DISCONTINUED | OUTPATIENT
Start: 2025-07-23 | End: 2025-07-23

## 2025-07-23 RX ORDER — FENTANYL CITRATE 50 UG/ML
INJECTION, SOLUTION INTRAMUSCULAR; INTRAVENOUS PRN
Status: DISCONTINUED | OUTPATIENT
Start: 2025-07-23 | End: 2025-07-23

## 2025-07-23 RX ORDER — HYDROMORPHONE HYDROCHLORIDE 1 MG/ML
0.2 INJECTION, SOLUTION INTRAMUSCULAR; INTRAVENOUS; SUBCUTANEOUS EVERY 5 MIN PRN
Status: DISCONTINUED | OUTPATIENT
Start: 2025-07-23 | End: 2025-07-23 | Stop reason: HOSPADM

## 2025-07-23 RX ORDER — GABAPENTIN 300 MG/1
1200 CAPSULE ORAL 3 TIMES DAILY
Status: DISCONTINUED | OUTPATIENT
Start: 2025-07-23 | End: 2025-07-23

## 2025-07-23 RX ORDER — LABETALOL HYDROCHLORIDE 5 MG/ML
10 INJECTION, SOLUTION INTRAVENOUS
Status: DISCONTINUED | OUTPATIENT
Start: 2025-07-23 | End: 2025-07-23 | Stop reason: HOSPADM

## 2025-07-23 RX ORDER — HYDROMORPHONE HYDROCHLORIDE 1 MG/ML
0.3 INJECTION, SOLUTION INTRAMUSCULAR; INTRAVENOUS; SUBCUTANEOUS
Status: DISCONTINUED | OUTPATIENT
Start: 2025-07-23 | End: 2025-07-23 | Stop reason: HOSPADM

## 2025-07-23 RX ORDER — LIDOCAINE 40 MG/G
CREAM TOPICAL
Status: DISCONTINUED | OUTPATIENT
Start: 2025-07-23 | End: 2025-07-25 | Stop reason: HOSPADM

## 2025-07-23 RX ORDER — SPIRONOLACTONE 25 MG/1
25 TABLET ORAL EVERY MORNING
Status: DISCONTINUED | OUTPATIENT
Start: 2025-07-24 | End: 2025-07-23

## 2025-07-23 RX ORDER — ONDANSETRON 2 MG/ML
4 INJECTION INTRAMUSCULAR; INTRAVENOUS EVERY 30 MIN PRN
Status: DISCONTINUED | OUTPATIENT
Start: 2025-07-23 | End: 2025-07-23 | Stop reason: HOSPADM

## 2025-07-23 RX ORDER — CEFAZOLIN SODIUM/WATER 2 G/20 ML
2 SYRINGE (ML) INTRAVENOUS SEE ADMIN INSTRUCTIONS
Status: DISCONTINUED | OUTPATIENT
Start: 2025-07-23 | End: 2025-07-23 | Stop reason: HOSPADM

## 2025-07-23 RX ORDER — ATORVASTATIN CALCIUM 40 MG/1
80 TABLET, FILM COATED ORAL AT BEDTIME
Status: DISCONTINUED | OUTPATIENT
Start: 2025-07-23 | End: 2025-07-25 | Stop reason: HOSPADM

## 2025-07-23 RX ORDER — CARVEDILOL 12.5 MG/1
12.5 TABLET ORAL 2 TIMES DAILY WITH MEALS
Status: DISCONTINUED | OUTPATIENT
Start: 2025-07-23 | End: 2025-07-25 | Stop reason: HOSPADM

## 2025-07-23 RX ADMIN — ONDANSETRON 4 MG: 2 INJECTION INTRAMUSCULAR; INTRAVENOUS at 12:59

## 2025-07-23 RX ADMIN — GABAPENTIN 1200 MG: 600 TABLET, FILM COATED ORAL at 15:13

## 2025-07-23 RX ADMIN — OXYCODONE HYDROCHLORIDE 5 MG: 5 TABLET ORAL at 19:12

## 2025-07-23 RX ADMIN — SODIUM CHLORIDE, SODIUM LACTATE, POTASSIUM CHLORIDE, AND CALCIUM CHLORIDE: .6; .31; .03; .02 INJECTION, SOLUTION INTRAVENOUS at 11:45

## 2025-07-23 RX ADMIN — FENTANYL CITRATE 25 MCG: 0.05 INJECTION, SOLUTION INTRAMUSCULAR; INTRAVENOUS at 10:58

## 2025-07-23 RX ADMIN — HYDROMORPHONE HYDROCHLORIDE 0.4 MG: 1 INJECTION, SOLUTION INTRAMUSCULAR; INTRAVENOUS; SUBCUTANEOUS at 13:41

## 2025-07-23 RX ADMIN — Medication 50 MG: at 11:57

## 2025-07-23 RX ADMIN — HYDROMORPHONE HYDROCHLORIDE 0.2 MG: 1 INJECTION, SOLUTION INTRAMUSCULAR; INTRAVENOUS; SUBCUTANEOUS at 21:42

## 2025-07-23 RX ADMIN — PROPOFOL 100 MG: 10 INJECTION, EMULSION INTRAVENOUS at 11:56

## 2025-07-23 RX ADMIN — SENNOSIDES AND DOCUSATE SODIUM 1 TABLET: 50; 8.6 TABLET ORAL at 21:45

## 2025-07-23 RX ADMIN — HYDROMORPHONE HYDROCHLORIDE 0.4 MG: 1 INJECTION, SOLUTION INTRAMUSCULAR; INTRAVENOUS; SUBCUTANEOUS at 14:03

## 2025-07-23 RX ADMIN — ACETAMINOPHEN 1000 MG: 500 TABLET ORAL at 17:59

## 2025-07-23 RX ADMIN — MIDAZOLAM 2 MG: 1 INJECTION INTRAMUSCULAR; INTRAVENOUS at 11:45

## 2025-07-23 RX ADMIN — ATORVASTATIN CALCIUM 80 MG: 40 TABLET, FILM COATED ORAL at 21:45

## 2025-07-23 RX ADMIN — HYDROMORPHONE HYDROCHLORIDE 0.2 MG: 1 INJECTION, SOLUTION INTRAMUSCULAR; INTRAVENOUS; SUBCUTANEOUS at 18:08

## 2025-07-23 RX ADMIN — DEXMEDETOMIDINE HYDROCHLORIDE 10 MCG: 100 INJECTION, SOLUTION INTRAVENOUS at 12:25

## 2025-07-23 RX ADMIN — POTASSIUM CHLORIDE 20 MEQ: 1500 TABLET, EXTENDED RELEASE ORAL at 18:03

## 2025-07-23 RX ADMIN — ACETAMINOPHEN 1000 MG: 500 TABLET ORAL at 21:45

## 2025-07-23 RX ADMIN — GABAPENTIN 1200 MG: 400 CAPSULE ORAL at 21:45

## 2025-07-23 RX ADMIN — LIDOCAINE HYDROCHLORIDE 60 MG: 20 INJECTION, SOLUTION INFILTRATION; PERINEURAL at 11:54

## 2025-07-23 RX ADMIN — FENTANYL CITRATE 50 MCG: 0.05 INJECTION, SOLUTION INTRAMUSCULAR; INTRAVENOUS at 13:36

## 2025-07-23 RX ADMIN — HYDROMORPHONE HYDROCHLORIDE 0.4 MG: 1 INJECTION, SOLUTION INTRAMUSCULAR; INTRAVENOUS; SUBCUTANEOUS at 14:43

## 2025-07-23 RX ADMIN — OXYCODONE HYDROCHLORIDE 5 MG: 5 TABLET ORAL at 15:21

## 2025-07-23 RX ADMIN — FENTANYL CITRATE 50 MCG: 50 INJECTION INTRAMUSCULAR; INTRAVENOUS at 11:53

## 2025-07-23 RX ADMIN — PROPOFOL 30 MG: 10 INJECTION, EMULSION INTRAVENOUS at 13:10

## 2025-07-23 RX ADMIN — TIZANIDINE 4 MG: 4 TABLET ORAL at 15:13

## 2025-07-23 RX ADMIN — HYDROMORPHONE HYDROCHLORIDE 0.2 MG: 1 INJECTION, SOLUTION INTRAMUSCULAR; INTRAVENOUS; SUBCUTANEOUS at 14:15

## 2025-07-23 RX ADMIN — DEXAMETHASONE SODIUM PHOSPHATE 4 MG: 4 INJECTION, SOLUTION INTRAMUSCULAR; INTRAVENOUS at 12:00

## 2025-07-23 RX ADMIN — PROPOFOL 75 MCG/KG/MIN: 10 INJECTION, EMULSION INTRAVENOUS at 12:04

## 2025-07-23 RX ADMIN — Medication 2 G: at 12:03

## 2025-07-23 RX ADMIN — OXYCODONE HYDROCHLORIDE 5 MG: 5 TABLET ORAL at 14:58

## 2025-07-23 RX ADMIN — HYDROMORPHONE HYDROCHLORIDE 0.5 MG: 1 INJECTION, SOLUTION INTRAMUSCULAR; INTRAVENOUS; SUBCUTANEOUS at 13:05

## 2025-07-23 RX ADMIN — CARVEDILOL 12.5 MG: 12.5 TABLET, FILM COATED ORAL at 10:43

## 2025-07-23 RX ADMIN — Medication 100 MG: at 13:08

## 2025-07-23 RX ADMIN — FENTANYL CITRATE 50 MCG: 50 INJECTION INTRAMUSCULAR; INTRAVENOUS at 12:34

## 2025-07-23 RX ADMIN — HYDROMORPHONE HYDROCHLORIDE 0.4 MG: 1 INJECTION, SOLUTION INTRAMUSCULAR; INTRAVENOUS; SUBCUTANEOUS at 13:48

## 2025-07-23 RX ADMIN — FENTANYL CITRATE 50 MCG: 0.05 INJECTION, SOLUTION INTRAMUSCULAR; INTRAVENOUS at 13:31

## 2025-07-23 RX ADMIN — PROPOFOL 20 MG: 10 INJECTION, EMULSION INTRAVENOUS at 12:05

## 2025-07-23 RX ADMIN — CEFAZOLIN SODIUM 2 G: 2 SOLUTION INTRAVENOUS at 21:46

## 2025-07-23 RX ADMIN — Medication 800 MG: at 21:53

## 2025-07-23 RX ADMIN — CARVEDILOL 12.5 MG: 12.5 TABLET, FILM COATED ORAL at 18:32

## 2025-07-23 ASSESSMENT — ACTIVITIES OF DAILY LIVING (ADL)
ADLS_ACUITY_SCORE: 47
ADLS_ACUITY_SCORE: 43
ADLS_ACUITY_SCORE: 47
ADLS_ACUITY_SCORE: 47
ADLS_ACUITY_SCORE: 41
ADLS_ACUITY_SCORE: 43
ADLS_ACUITY_SCORE: 47
ADLS_ACUITY_SCORE: 43
ADLS_ACUITY_SCORE: 41

## 2025-07-23 NOTE — CONSULTS
[facility]  Consult Note - Hospitalist Service  Date of Admission:  7/23/2025  Consult Requested by: Dr. Ramirez  Reason for Consult: Medical co-management     Assessment & Plan   Nadira Perez is a 75 year old female admitted on 7/23/2025. 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 Underwent right minimally invasive fusion of SI joint with Dr. Ramirez on 7/23/2025.    Bilateral SI joint pain s/p right minimally invasive fusion of SI joint (7/23/2025)  Underwent above with Dr. Ramirez. EBL minimal. No reported complications.   - Diet, activity, weight bearing status, DVT ppx, abx, pain management per primary team     Hypokalemia  Noted at pre-op visit. Has had poor PO intake since hernia repair. Was sent in supplementation but remains slightly low.   - Recheck BMP now   - Potassium replacement protocol     HLD  HTN  Frequent PVCs  HFimpEF due to NICM  Possible old inferolateral infarct    Follows with Dr. Rascon. Hx of stress cardiomyopathy with EF down to 35% (2/2024), now improved to 50-55%. Per Dr. Rascon's note from 1/2025, has findings of a small inferolateral infarction but since asymptomatic, coronary CTA/invasive angiogram were deferred.   - Holding PTA lisinopril 20 mg BID, hydrochlorothiazide 25 mg daily and spironolactone 25 mg qPM perioperatively   - Continue PTA carvedilol 12.5 mg BID with hold parameters  - Continue PTA atorvastatin 80 mg qPM  - Has follow up with Dr. Rascon on 9/22     LOLY  - CPAP      Hypothyroidism  - Continue PTA levothyroxine 61 mcg daily     Prediabetes    A1c 5.6 in 7/2025. Diet controlled. No need for BG checks.     Hx of incarcerated ventral hernia s/p repair  Underwent MERLENE, ischemic small bowel resection with primary anastomosis and primary hernia closure on 5/4/2025 with Dr. Cintron. At increased risk for ileus or bowel obstruction.  - Bowel regimen     S/p right revision function otoplasty (6/4/20250)  Follows with Plastic Surgery.  - Follow up with Dr. Agudelo     Hx of left sided stage IIIC inflammatory breast cancer (ER/WV positive, HER2 negative)  Dx in 2007. S/p neoadjuvant chemotherapy, bilateral mastectomy, radiation and 14 years of endocrine therapy.     Multiple hyperdense lesions within bilateral kidneys  Noted on CT from 5/2025. May represent proteinaceous/hemorrhagic cysts. Recommended for nonemergent dedicated MRI renal protocol  - Defer to PCP as outpatient    Hyperdense left adrenal lesion  Also noted on CT from 5/2025. Measuring up to 3 cm. Recommended for MRI evaluation.  - Defer to PCP as outpatient        Clinically Significant Risk Factors Present on Admission        # Hypokalemia: Lowest K = 3.2 mmol/L in last 2 days, will replace as needed            # Hypertension: Noted on problem list               # Financial/Environmental Concerns:           Maya Trejo PA-C  Hospitalist Service  Securely message with indidebt (more info)  Text page via [facility] Paging/Directory   ______________________________________________________________________    Chief Complaint   Post-op    History is obtained from the patient    History of Present Illness   Nadira Perez is a 75 year old female who is admitted following above procedure with Dr. Ramirez. Struggling with pain at the moment. Can't get comfortable and pain worse with movement. Has been passing gas. Voided. Ate some pudding. No nausea or vomiting.       Past Medical History    Past Medical History:    Diagnosis Date    Anemia Off and on from years back    Arthritis     Bone disease     Breast cancer (H)     Cataract     Chest pain Since 2/12/24 sternal fracture (secondary to fall), pain level started high, then decreased to moderate two weeks later; then to mild to none since then.  Sneezing and coughing bring it up again to a moderate level.    Chronic osteoarthritis Many years    Diabetes (H)     Difficult intubation     Use Peds #5 due to throat kyphoplasty    Gastroesophageal reflux disease     H/O kyphoplasty     Hearing problem     Hepatitis 1975    Monohepatitis    History of blood transfusion     History of kidney stones     History of radiation therapy     Hyperlipemia     Hyperlipidemia Recent concern with elevated results (been on statins for years).    Hypertension     Hypopotassemia     Irregular heart beat     Kidney problem     Lymph edema     Medullary sponge kidney     Motion sickness     Balance issues    Osteopenia     PONV (postoperative nausea and vomiting)     Reduced vision     Scoliosis Sceondary to advanced osteoporosis.    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    Thyroid disease        Past Surgical History   Past Surgical History:   Procedure Laterality Date    ABDOMEN SURGERY      ovarian cyst, mesh    ARTHRODESIS WRIST Right     ARTHRODESIS WRIST  02/14/2013    Procedure: ARTHRODESIS WRIST;  left wrist scaphoid excision, four bone fusion, iliac crest bone graft  ( Mac with block);  Surgeon: Av Mendez MD;  [facility]: [facility]    BIOPSY      skin, vaginal    BLEPHAROPLASTY BILATERAL Bilateral 05/06/2022    Procedure: UPPER BLEPHAROPLASTY, BILATERAL;  Surgeon: Jemal Sanchez MD;  Location: [facility] OR    CATARACT IOL, RT/LT Right 03/13/2018    CATARACT IOL, RT/LT Left 02/20/2018    COLONOSCOPY   12/24/2013    Procedure: COMBINED COLONOSCOPY, SINGLE BIOPSY/POLYPECTOMY BY BIOPSY;  COLONOSCOPY;  Surgeon: Dom Alvarez MD;  Location:  GI    COLONOSCOPY N/A 03/12/2024    Procedure: COLONOSCOPY, FLEXIBLE, WITH LESION REMOVAL USING SNARE;  Surgeon: Amina Espinoza MD;  Location:  GI    COSMETIC BLEPHAROPLASTY LOWER LIDS BILATERAL Bilateral 05/06/2022    Procedure: BLEPHAROPLASTY, LOWER EYELID, BILATERAL, COSMETIC;  Surgeon: Jemal Sanchez MD;  Location: [facility] OR    COSMETIC SURGERY      ESOPHAGOSCOPY, GASTROSCOPY, DUODENOSCOPY (EGD), COMBINED N/A 11/23/2016    Procedure: COMBINED ESOPHAGOSCOPY, GASTROSCOPY, DUODENOSCOPY (EGD);  Surgeon: Quinten Feliciano MD;  Location:  GI    ESOPHAGOSCOPY, GASTROSCOPY, DUODENOSCOPY (EGD), COMBINED N/A 03/12/2024    Procedure: ESOPHAGOGASTRODUODENOSCOPY, WITH BIOPSY;  Surgeon: Amina Espinoza MD;  Location: [facility]    EXTERNAL EAR SURGERY      right    EYE SURGERY      radial keratomy    FUSION, SPINE, INTERBODY, OBLIQUE ANTERIOR AND LUMBAR, 2 LEVELS, POST APPROACH, USING OTS N/A 11/18/2021    Procedure: Part 1: Oblique Anterior Interbody Fusion at Lumbar 5 to sacral 1 with use of Bone Morphogenic Protein,;  Surgeon: Serge Ramirez MD;  Location:  OR    GI SURGERY      Umbilical hernia repair    GRAFT BONE FROM ILIAC CREST  02/14/2013    Procedure: GRAFT BONE FROM ILIAC CREST;  mac with block and local infilitration;  Surgeon: Av Mendez MD;  Location:  OR     BREATH HYDROGEN TEST N/A 10/14/2016    Procedure: HYDROGEN BREATH TEST;  Surgeon: Cheri Barron MD;  Location:  GI    HERNIA REPAIR      umbilical age 18 mos.    HYSTERECTOMY      HYSTERECTOMY TOTAL ABDOMINAL  05/03/2000    INJECT EPIDURAL CAUDAL N/A 09/12/2023    Procedure: Caudal epidural steroid injection with fluoroscopy;  Surgeon: Natasha Umaña MD;  Location: [facility] OR    INJECT EPIDURAL CAUDAL N/A 01/02/2024    Procedure: INJECTION, EPIDURAL, CAUDAL;  Surgeon:  Natasha Umaña MD;  Location: [facility] OR    INJECT EPIDURAL LUMBAR N/A 05/23/2023    Procedure: L3-4 interlaminar epidural steroid injection with fluoroscopy ( left> right);  Surgeon: Natsaha Umaña MD;  Location: [facility] OR    INJECT JOINT SACROILIAC Left 04/27/2023    Procedure: Left sacroiliac joint steroid injection with fluoroscopy;  Surgeon: Natasha Umaña MD;  Location: [facility] OR    INJECT JOINT SACROILIAC Bilateral 10/02/2024    Procedure: Bilateral sacroiliac joint injection;  Surgeon: Thais Saldivar MD;  Location: [facility] OR    INJECT JOINT SACROILIAC Left 7/2/2025    Procedure: Repeat sacroiliac joint injection;  Surgeon: Thais Saldivar MD;  Location: [facility] OR    INJECT SACROILIAC JOINT Bilateral 02/26/2025    Procedure: Bilateral sacroiliac joint steroid injections;  Surgeon: Thais Saldivar MD;  Location: [facility] OR    LAPAROSCOPY DIAGNOSTIC (GENERAL) N/A 05/04/2025    Procedure: LAPAROSCOPY, DIAGNOSTIC with lysis of adhesions, converted to;  Surgeon: Jacobo Cintron DO;  Location: [facility] OR    MASTECTOMY MODIFIED RADICAL Bilateral     bilateral; right breast prophylactic    OOPHOROPEXY      OPTICAL TRACKING SYSTEM FUSION POSTERIOR SPINE LUMBAR N/A 11/18/2021    Procedure: Open Posterior Instrumented Spinal Fusion at Lumbar 5 to Sacral 1, with grimm aguayo osteotomy, use of O-Arm/Stealth;  Surgeon: Serge Ramirez MD;  Location: [facility]    OTOPLASTY Bilateral 07/25/2024    Procedure: Right Revision Functional Otoplasty;  Surgeon: Juan Agudelo MD;  Location: [facility] OR    OTOPLASTY Right 6/4/2025    Procedure: Right revision functional otoplasty;  Surgeon: Juan Agudelo MD;  Location: [facility] OR    PARATHYROIDECTOMY  09/23/2004    R SUPERIOR    PARATHYROIDECTOMY  09/23/2004    parathyroid resection, subtotal    PICC INSERTION - DOUBLE LUMEN Right 05/08/2025    38-1cm, Basilic vein    [place] HAND/FINGER SURGERY UNLISTED  Bilateral partial wrist fusions [id number]    [place] SPINE SURGERY  PROCEDURE UNLISTED  December 2021    SC STOMACH SURGERY PROCEDURE UNLISTED  Several throughout the years    RELEASE CARPAL TUNNEL Right 12/02/2021    Procedure: RIGHT CARPAL TUNNEL RELEASE, RIGHT WRIST HARDWARE REMOVAL, RIGHT CARPAL BOSS EXCISION;  Surgeon: Rolan Conley MD;  Location: [facility] OR    REMOVE HARDWARE HAND  09/24/2013    Procedure: REMOVE HARDWARE HAND;  Left Hand Screw Removal       RESECT SMALL BOWEL WITHOUT OSTOMY N/A 05/04/2025    Procedure: open laparotomy with small bowel resection x1;  Surgeon: Jacobo Cintron DO;  Location: [facility] OR    RHINOPLASTY  1968    thyr proc skin closed cosmetic manner by subcuticular stitch  01/23/2009    THYROPLASTY  10/09/2009    TONSILLECTOMY  1977    VITRECTOMY PARSPLANA WITH 25 GAUGE SYSTEM Left 06/20/2022    Procedure: LEFT EYE VITRECTOMY, PARS PLANA APPROACH, USING 25-GAUGE INSTRUMENTS, laser;  Surgeon: Felix Carlos MD;  Location: [facility] OR    WRIST SURGERY      wrist arthrodesis       Medications   I have reviewed this patient's current medications       Review of Systems    The 5 point Review of Systems is negative other than noted in the HPI or here.      Physical Exam   Vital Signs: Temp: 96.8  F (36  C) Temp src: Axillary BP: (!) 144/87 Pulse: 74   Resp: 17 SpO2: 94 % O2 Device: None (Room air) Oxygen Delivery: 2 LPM  Weight: 141 lbs 15.62 oz    General Appearance: Awake. Alert and oriented x4. Laying in bed. No acute distress.   Eyes: Normal lids. Anicteric sclera. Pupils equal.   HEENT: Normocephalic. Atraumatic. Nares patent. Mucous membranes dry.   Respiratory: Normal work of breathing on room air. Lungs CTAB. NO wheezes.   Cardiovascular: RRR. S1, S2. No murmurs. No lower extremity edema.   GI: Abdomen non-distended. Normoactive. Soft, non-tender. No guarding.   Lymph/Hematologic: No abnormal or excessive bruising on exposed skin.   Skin: Warm, dry. No rashes on exposed skin.   Musculoskeletal: Moves all extremities sponteaneously.    Neurologic: No focal deficits.     Medical Decision Making       60 MINUTES SPENT BY ME on the date of service doing chart review, history, exam, documentation & further activities per the note.      Data     I have personally reviewed the following data over the past 24 hrs:    N/A  \   N/A   / N/A     N/A N/A N/A /  101 (H)   3.2 (L) N/A N/A \       Imaging results reviewed over the past 24 hrs:   Recent Results (from the past 24 hours)   XR Surgery OARM    Narrative    This exam was marked as non-reportable because it will not be read by a   radiologist or a [place] non-radiologist provider.

## 2025-07-23 NOTE — BRIEF OP NOTE
[facility]    Brief Operative Note    Pre-operative diagnosis: Sacroiliac joint pain [M53.3]  Post-operative diagnosis Same as pre-operative diagnosis    Procedure: Right Minimally Invasive FUSION, SACROILIAC JOINT, USING OPTICAL TRACKING SYSTEM, use of SI Bone Torque implants, N/A - Sacrum    Surgeon: Surgeons and Role:     * Serge Ramirez MD - Primary  Anesthesia: General   Estimated Blood Loss: Minimal    Drains: None  Specimens: * No specimens in log *  Findings:   Good placement of hardware.  Complications: None.  Implants:   Implant Name Type Inv. Item Serial No.  Lot No. LRB No. Used Action   IMP SPINAL IFUSE TORQ [id number] STRL LF [id number] - [id number] Metallic Hardware/Tiro IMP SPINAL IFUSE TORQ [id number] STRL LF [id number]  [employer] [id number] N/A 1 Implanted   IMP SPINAL FX IFUSE TORQ L40 MM OD10 MM [id number] - [id number] Metallic Hardware/Tiro IMP SPINAL FX IFUSE TORQ L40 MM OD10 MM [id number]  [employer] [id number] N/A 1 Implanted   10 75 SI BONE     [id number] N/A 1 Implanted

## 2025-07-23 NOTE — PLAN OF CARE
"Goal Outcome Evaluation:      Plan of Care Reviewed With: patient    Overall Patient Progress: no change Overall Patient Progress: no change    Outcome Evaluation: Cared for patient from 1132-0384. Patient is AOx4 with intermittent confusion, no SOB, baseline numbness/tingling in lower extremities. Assist of 1 w/ walker and gait belt, patient cannot stand completely upright when walking. Main issue is pain management. IV dilaudid given once, oxy 5 mg PO given once, tylenol given once. Patient rated pain after administration 4/10 when not moving. Patient does not want robaxin given      VS: /80   Pulse 67   Temp 99.8  F (37.7  C) (Oral)   Resp 18   Ht 1.651 m (5' 5\")   Wt 64.4 kg (141 lb 15.6 oz)   LMP  (LMP Unknown)   SpO2 96%   BMI 23.63 kg/m      O2: RA, no shortness of breath   Output: Adequate, urinated once after surgery, UTV any drainage for incisions, dressings are clean dry, intact   Last BM:    Activity: Assist x1 w/ walker and gait belt. Patient cannot stand completely upright when walking, generalized weakness   Skin: L side of head laceration, staples in place. Lower R and L back incisions covered with primapore,    Pain: See outcome evaluation above regarding pain.    CMS: Baseline numbness/tingling in lower extremities, no edema    Dressing: Lower R and L back, clean, dry, intact. Transparent dressing over R PIV, clean, dry, intact   Diet: Regular diet, whole pills, thin liquids   LDA: R PIV saline locked   Equipment: N/A   Plan: Continue to monitor   Additional Info: Patient takes 5 mg oxy at home and would like it to be increased to 10 mg. Provider notified and they want to see if the 5 mg oxy and IV dilaudid together is therapeutic before ordering 10 mg. Patient is a retired nurse            "

## 2025-07-23 NOTE — ANESTHESIA POSTPROCEDURE EVALUATION
Patient: Nadira Perez    Procedure: Procedure(s):  Right Minimally Invasive FUSION, SACROILIAC JOINT, USING OPTICAL TRACKING SYSTEM, use of SI Bone Torque implants       Anesthesia Type:  General    Note:  Disposition: Inpatient   Postop Pain Control: Uneventful            Sign Out: Well controlled pain   PONV: No   Neuro/Psych: Uneventful            Sign Out: Acceptable/Baseline neuro status   Airway/Respiratory: Uneventful            Sign Out: Acceptable/Baseline resp. status   CV/Hemodynamics: Uneventful            Sign Out: Acceptable CV status; No obvious hypovolemia; No obvious fluid overload   Other NRE: NONE   DID A NON-ROUTINE EVENT OCCUR? No    Event details/Postop Comments:  Ismael requests her home doses of gabapentin and tizanidine. She is satting mid-high 90s on room air. She reports her stomach is feeling at its new baseline since hernia surgery, hence she is going slow with eating and drinking, but it hasn't gotten worse. She reports no further questions re anesthesia.            Last vitals:  Vitals Value Taken Time   /81 07/23/25 14:32   Temp 36  C (96.8  F) 07/23/25 13:22   Pulse 71 07/23/25 14:45   Resp 18 07/23/25 14:45   SpO2 96 % 07/23/25 14:44   Vitals shown include unfiled device data.    Electronically Signed By: Renée Jesus MD  July 23, 2025  2:45 PM

## 2025-07-23 NOTE — ANESTHESIA CARE TRANSFER NOTE
Patient: Nadira Perez    Procedure: Procedure(s):  Right Minimally Invasive FUSION, SACROILIAC JOINT, USING OPTICAL TRACKING SYSTEM, use of SI Bone Torque implants       Diagnosis: Sacroiliac joint pain [M53.3]  Diagnosis Additional Information: No value filed.    Anesthesia Type:   General     Note:    Oropharynx: oropharynx clear of all foreign objects  Level of Consciousness: awake  Oxygen Supplementation: face mask  Level of Supplemental Oxygen (L/min / FiO2): 6  Independent Airway: airway patency satisfactory and stable  Dentition: dentition unchanged  Vital Signs Stable: post-procedure vital signs reviewed and stable  Report to RN Given: handoff report given  Patient transferred to: PACU    Handoff Report: Identifed the Patient, Identified the Reponsible Provider, Reviewed the pertinent medical history, Discussed the surgical course, Reviewed Intra-OP anesthesia mangement and issues during anesthesia, Set expectations for post-procedure period and Allowed opportunity for questions and acknowledgement of understanding      Vitals:  Vitals Value Taken Time   BP     Temp     Pulse 72 07/23/25 13:23   Resp     SpO2 99 % 07/23/25 13:23   Vitals shown include unfiled device data.    Electronically Signed By: MERCEDES White CRNA  July 23, 2025  1:24 PM

## 2025-07-23 NOTE — OR NURSING
PACU to Inpatient Nursing Handoff    Patient Nadira Perez is a 75 year old female who speaks English.   Procedure Procedure(s):  Right Minimally Invasive FUSION, SACROILIAC JOINT, USING OPTICAL TRACKING SYSTEM, use of SI Bone Torque implants   Surgeon(s) Primary: Serge Ramirez MD     Allergies   Allergen Reactions    Erythromycin Nausea    Penicillin G     Penicillins Hives     Around age 6 - doesn't recall full reaction, mother told her it was hives.     Has tolerated cephalsporins.        Isolation  [unfilled]     Past Medical History   has a past medical history of Anemia (Off and on from years back), Arthritis, Bone disease, Breast cancer (H), Cataract, Chest pain (Since 2/12/24 sternal fracture (secondary to fall), pain level started high, then decreased to moderate two weeks later; then to mild to none since then.  Sneezing and coughing bring it up again to a moderate level.), Chronic osteoarthritis (Many years), Diabetes (H), Difficult intubation, Gastroesophageal reflux disease, H/O kyphoplasty, Hearing problem, Hepatitis (1975), History of blood transfusion, History of kidney stones, History of radiation therapy, Hyperlipemia, Hyperlipidemia (Recent concern with elevated results (been on statins for years).), Hypertension, Hypopotassemia, Irregular heart beat, Kidney problem, Lymph edema, Medullary sponge kidney, Motion sickness, Osteopenia, PONV (postoperative nausea and vomiting), Reduced vision, Scoliosis (Sceondary to advanced osteoporosis.), Sleep apnea, Squamous cell skin cancer, Stomach ulcer (Diagnosed earlier this month during gastric endoscopy, which diagnosed a hiatal hernia.), and Thyroid disease.    Anesthesia General   Dermatome Level     Preop Meds Coreg 12.5 mg - time given: 1043   Nerve block Not applicable   Intraop Meds dexamethasone (Decadron)  dexmedetomidine (Precedex): 10 mcg total  fentanyl (Sublimaze): 100 mcg total  hydromorphone (Dilaudid): 0.5 mg  total  ondansetron (Zofran): last given at 1300   Local Meds Yes   Antibiotics cefazolin (Ancef) - last given at 1203     Pain Patient Currently in Pain: yes   PACU meds  fentanyl (Sublimaze): 100 mcg (total dose) last given at 1336   hydromorphone (Dilaudid): 1.4 mg (total dose) last given at 1415    PCA / epidural No   Capnography     Telemetry     Inpatient Telemetry Monitor Ordered? No        Labs Glucose Lab Results   Component Value Date    GLC 93 07/23/2025    GLC 96 01/12/2022    GLC 99 05/07/2021    GLC 98 05/07/2021       Hgb Lab Results   Component Value Date    HGB 11.7 07/17/2025    HGB 11.5 05/07/2021       INR Lab Results   Component Value Date    INR 1.09 05/04/2025    INR 0.85 08/20/2010      PACU Imaging Not applicable     Wound/Incision Incision/Surgical Site 11/18/21 Posterior;Midline Lumbar spine (Active)   Number of days: 1343       Incision/Surgical Site 11/18/21 Anterior;Left Hip (Active)   Number of days: 1343       Incision/Surgical Site 12/02/21 Right Wrist (Active)   Number of days: 1329       Incision/Surgical Site 05/06/22 Bilateral Upper Eyelid (Active)   Number of days: 1174       Incision/Surgical Site 05/06/22 Bilateral Lower Eyelid (Active)   Number of days: 1174       Incision/Surgical Site 06/20/22 Left Eye (Active)   Number of days: 1129       Incision/Surgical Site 07/25/24 Right Ear (Active)   Number of days: 363       Incision/Surgical Site 05/04/25 Lower;Right;Anterior Abdomen (Active)   Number of days: 80       Incision/Surgical Site 05/04/25 Lower Abdomen (Active)   Number of days: 80       Incision/Surgical Site 06/04/25 Right Ear (Active)   Number of days: 49       Incision/Surgical Site 07/23/25 Right;Left Back (Active)   Incision Assessment WDL 07/23/25 1307   Closure Liquid bandage;Sutures 07/23/25 1307   Dressing Intervention New dressing applied 07/23/25 1307   Number of days: 0      CMS        Equipment ice pack   Other LDA       IV Access Peripheral IV 07/23/25  Anterior;Right Lower forearm (Active)   Site Assessment WDL 07/23/25 1042   Line Status blood return noted;Saline locked 07/23/25 1042   Dressing Transparent 07/23/25 1042   Dressing Status clean;dry;intact 07/23/25 1042   Dressing Intervention New dressing  07/23/25 1042   Dressing Change Due 07/30/25 07/23/25 1042   Line Intervention Lab drawn;Flushed 07/23/25 1042   Line Necessity Yes, meets criteria 07/23/25 1042   Phlebitis Scale 0-->no symptoms 07/23/25 1042   Infiltration? no 07/23/25 1042   Number of days: 0      Blood Products Not applicable EBL minimal mL   Intake/Output Date 07/23/25 0700 - 07/24/25 0659   Shift 3261-6230 4539-9689 6484-1938 24 Hour Total   INTAKE   I.V. 800   800   Shift Total(mL/kg) 800(12.42)   800(12.42)   OUTPUT   Shift Total(mL/kg)       Weight (kg) 64.4 64.4 64.4 64.4      Drains / Fong     Time of void PreOp Time of Void Prior to Procedure: 1000 (07/23/25 1121)    PostOp      Diapered? No   Bladder Scan     PO    tolerating sips     Vitals    B/P: 117/81  T: 96.8  F (36  C)    Temp src: Axillary  P:  Pulse: 71 (07/23/25 1322)          R: 16  O2:  SpO2: 100 %    O2 Device: Oxymask (07/23/25 1322)    Oxygen Delivery: 6 LPM (07/23/25 1322)         Family/support present PATY available by phone   Patient belongings     Patient transported on bed   DC meds/scripts (obs/outpt) Not applicable   Inpatient Pain Meds Released? Yes       Special needs/considerations None   Tasks needing completion None     1443 dilaudid   1458 oxy  1513 osiel  1513 zanaflex  1521 oxy  PURVI ASIF, LEILA Scott

## 2025-07-23 NOTE — ANESTHESIA PROCEDURE NOTES
Airway       Patient location during procedure: OR       Procedure Start/Stop Times: 7/23/2025 11:59 AM  Staff -        CRNA: Jami Madison APRN CRNA       Performed By: CRNA  Consent for Airway        Urgency: elective  Indications and Patient Condition       Indications for airway management: harriett-procedural       Induction type:intravenous       Mask difficulty assessment: 1 - vent by mask    Final Airway Details       Final airway type: endotracheal airway       Successful airway: ETT - single  Endotracheal Airway Details        ETT size (mm): 6.5       Cuffed: yes       Successful intubation technique: direct laryngoscopy and video laryngoscopy       DL Blade Type: Dewitt 1       VL Blade Size: Glidescope 3 (hyperangle)       Grade View of Cords: 1       Adjucts: stylet       Position: Right       Measured from: lips       Secured at (cm): 22       Bite block used: None    Post intubation assessment        Placement verified by: capnometry, equal breath sounds and chest rise        Number of attempts at approach: 1       Secured with: silk tape       Ease of procedure: easy       Dentition: Intact and Unchanged    Medication(s) Administered   Medication Administration Time: 7/23/2025 11:59 AM

## 2025-07-23 NOTE — CONSULTS
"Consult received for Vascular Access Team.  See LDA for details. For additional needs place \"Consult for Inpatient Vascular Access Care\"  [id number] order in EPIC.  "

## 2025-07-24 ENCOUNTER — TELEPHONE (OUTPATIENT)
Dept: PHYSICAL MEDICINE AND REHAB | Facility: CLINIC | Age: 75
End: 2025-07-24

## 2025-07-24 VITALS
TEMPERATURE: 99.7 F | WEIGHT: 141.98 LBS | BODY MASS INDEX: 23.65 KG/M2 | HEART RATE: 71 BPM | HEIGHT: 65 IN | RESPIRATION RATE: 18 BRPM | DIASTOLIC BLOOD PRESSURE: 98 MMHG | OXYGEN SATURATION: 96 % | SYSTOLIC BLOOD PRESSURE: 146 MMHG

## 2025-07-24 LAB
ANION GAP SERPL CALCULATED.3IONS-SCNC: 13 MMOL/L (ref 7–15)
BUN SERPL-MCNC: 22.8 MG/DL (ref 8–23)
CALCIUM SERPL-MCNC: 8.8 MG/DL (ref 8.8–10.4)
CHLORIDE SERPL-SCNC: 100 MMOL/L (ref 98–107)
CREAT SERPL-MCNC: 0.66 MG/DL (ref 0.51–0.95)
EGFRCR SERPLBLD CKD-EPI 2021: >90 ML/MIN/1.73M2
GLUCOSE BLDC GLUCOMTR-MCNC: 116 MG/DL (ref 70–99)
GLUCOSE SERPL-MCNC: 106 MG/DL (ref 70–99)
HCO3 SERPL-SCNC: 26 MMOL/L (ref 22–29)
HGB BLD-MCNC: 11 G/DL (ref 11.7–15.7)
MCV RBC AUTO: 90 FL (ref 78–100)
POTASSIUM SERPL-SCNC: 3.5 MMOL/L (ref 3.4–5.3)
SODIUM SERPL-SCNC: 139 MMOL/L (ref 135–145)

## 2025-07-24 PROCEDURE — 250N000013 HC RX MED GY IP 250 OP 250 PS 637: Performed by: NURSE PRACTITIONER

## 2025-07-24 PROCEDURE — 250N000013 HC RX MED GY IP 250 OP 250 PS 637: Performed by: PHYSICIAN ASSISTANT

## 2025-07-24 PROCEDURE — 36415 COLL VENOUS BLD VENIPUNCTURE: CPT | Performed by: STUDENT IN AN ORGANIZED HEALTH CARE EDUCATION/TRAINING PROGRAM

## 2025-07-24 PROCEDURE — 250N000013 HC RX MED GY IP 250 OP 250 PS 637: Performed by: STUDENT IN AN ORGANIZED HEALTH CARE EDUCATION/TRAINING PROGRAM

## 2025-07-24 PROCEDURE — 250N000011 HC RX IP 250 OP 636: Mod: JW | Performed by: STUDENT IN AN ORGANIZED HEALTH CARE EDUCATION/TRAINING PROGRAM

## 2025-07-24 PROCEDURE — 82947 ASSAY GLUCOSE BLOOD QUANT: CPT | Performed by: PHYSICIAN ASSISTANT

## 2025-07-24 PROCEDURE — 85018 HEMOGLOBIN: CPT | Performed by: STUDENT IN AN ORGANIZED HEALTH CARE EDUCATION/TRAINING PROGRAM

## 2025-07-24 PROCEDURE — 99232 SBSQ HOSP IP/OBS MODERATE 35: CPT | Performed by: INTERNAL MEDICINE

## 2025-07-24 PROCEDURE — 250N000013 HC RX MED GY IP 250 OP 250 PS 637: Performed by: ORTHOPAEDIC SURGERY

## 2025-07-24 PROCEDURE — 120N000002 HC R&B MED SURG/OB UMMC

## 2025-07-24 RX ORDER — NALOXONE HYDROCHLORIDE 0.4 MG/ML
0.4 INJECTION, SOLUTION INTRAMUSCULAR; INTRAVENOUS; SUBCUTANEOUS
Status: DISCONTINUED | OUTPATIENT
Start: 2025-07-24 | End: 2025-07-25 | Stop reason: HOSPADM

## 2025-07-24 RX ORDER — POLYETHYLENE GLYCOL 3350 17 G/17G
17 POWDER, FOR SOLUTION ORAL DAILY
Qty: 510 G | Refills: 0 | Status: SHIPPED | OUTPATIENT
Start: 2025-07-24

## 2025-07-24 RX ORDER — HYDROMORPHONE HYDROCHLORIDE 2 MG/1
2 TABLET ORAL EVERY 4 HOURS PRN
Refills: 0 | Status: DISCONTINUED | OUTPATIENT
Start: 2025-07-24 | End: 2025-07-25 | Stop reason: HOSPADM

## 2025-07-24 RX ORDER — NALOXONE HYDROCHLORIDE 0.4 MG/ML
0.2 INJECTION, SOLUTION INTRAMUSCULAR; INTRAVENOUS; SUBCUTANEOUS
Status: DISCONTINUED | OUTPATIENT
Start: 2025-07-24 | End: 2025-07-25 | Stop reason: HOSPADM

## 2025-07-24 RX ORDER — METHOCARBAMOL 500 MG/1
500 TABLET, FILM COATED ORAL EVERY 6 HOURS PRN
Status: DISCONTINUED | OUTPATIENT
Start: 2025-07-24 | End: 2025-07-25 | Stop reason: HOSPADM

## 2025-07-24 RX ORDER — AMOXICILLIN 250 MG
1 CAPSULE ORAL 2 TIMES DAILY
COMMUNITY
Start: 2025-07-24

## 2025-07-24 RX ORDER — LIDOCAINE 4 G/G
2 PATCH TOPICAL EVERY 24 HOURS
COMMUNITY
Start: 2025-07-24

## 2025-07-24 RX ORDER — LIDOCAINE 4 G/G
2 PATCH TOPICAL
Status: DISCONTINUED | OUTPATIENT
Start: 2025-07-24 | End: 2025-07-25 | Stop reason: HOSPADM

## 2025-07-24 RX ORDER — HYDROMORPHONE HYDROCHLORIDE 2 MG/1
2-4 TABLET ORAL EVERY 4 HOURS PRN
Qty: 30 TABLET | Refills: 0 | Status: SHIPPED | OUTPATIENT
Start: 2025-07-24 | End: 2025-07-29

## 2025-07-24 RX ORDER — METHOCARBAMOL 500 MG/1
500 TABLET, FILM COATED ORAL EVERY 6 HOURS PRN
Qty: 25 TABLET | Refills: 0 | Status: SHIPPED | OUTPATIENT
Start: 2025-07-24

## 2025-07-24 RX ADMIN — ATORVASTATIN CALCIUM 80 MG: 40 TABLET, FILM COATED ORAL at 22:39

## 2025-07-24 RX ADMIN — Medication 56 MCG: at 08:18

## 2025-07-24 RX ADMIN — LIDOCAINE 2 PATCH: 4 PATCH TOPICAL at 12:36

## 2025-07-24 RX ADMIN — CEFAZOLIN SODIUM 2 G: 2 SOLUTION INTRAVENOUS at 12:37

## 2025-07-24 RX ADMIN — SENNOSIDES AND DOCUSATE SODIUM 1 TABLET: 50; 8.6 TABLET ORAL at 08:18

## 2025-07-24 RX ADMIN — OXYCODONE HYDROCHLORIDE 5 MG: 5 TABLET ORAL at 08:18

## 2025-07-24 RX ADMIN — TIZANIDINE 4 MG: 4 TABLET ORAL at 22:39

## 2025-07-24 RX ADMIN — OXYCODONE HYDROCHLORIDE 5 MG: 5 TABLET ORAL at 03:33

## 2025-07-24 RX ADMIN — ACETAMINOPHEN 1000 MG: 500 TABLET ORAL at 08:18

## 2025-07-24 RX ADMIN — POLYETHYLENE GLYCOL 3350 17 G: 17 POWDER, FOR SOLUTION ORAL at 08:19

## 2025-07-24 RX ADMIN — CARVEDILOL 12.5 MG: 12.5 TABLET, FILM COATED ORAL at 08:18

## 2025-07-24 RX ADMIN — Medication 3 MG: at 20:34

## 2025-07-24 RX ADMIN — CARVEDILOL 12.5 MG: 12.5 TABLET, FILM COATED ORAL at 18:39

## 2025-07-24 RX ADMIN — ACETAMINOPHEN 1000 MG: 500 TABLET ORAL at 20:34

## 2025-07-24 RX ADMIN — HYDROMORPHONE HYDROCHLORIDE 0.2 MG: 1 INJECTION, SOLUTION INTRAMUSCULAR; INTRAVENOUS; SUBCUTANEOUS at 00:40

## 2025-07-24 RX ADMIN — GABAPENTIN 1200 MG: 400 CAPSULE ORAL at 14:07

## 2025-07-24 RX ADMIN — Medication 800 MG: at 22:41

## 2025-07-24 RX ADMIN — METHOCARBAMOL 500 MG: 500 TABLET ORAL at 18:39

## 2025-07-24 RX ADMIN — Medication 3 MG: at 12:35

## 2025-07-24 RX ADMIN — Medication 3 MG: at 16:45

## 2025-07-24 RX ADMIN — POTASSIUM CHLORIDE 20 MEQ: 1500 TABLET, EXTENDED RELEASE ORAL at 08:18

## 2025-07-24 RX ADMIN — GABAPENTIN 1200 MG: 400 CAPSULE ORAL at 20:33

## 2025-07-24 RX ADMIN — GABAPENTIN 1200 MG: 400 CAPSULE ORAL at 08:18

## 2025-07-24 RX ADMIN — ACETAMINOPHEN 1000 MG: 500 TABLET ORAL at 14:07

## 2025-07-24 RX ADMIN — CEFAZOLIN SODIUM 2 G: 2 SOLUTION INTRAVENOUS at 03:22

## 2025-07-24 RX ADMIN — SENNOSIDES AND DOCUSATE SODIUM 1 TABLET: 50; 8.6 TABLET ORAL at 20:33

## 2025-07-24 ASSESSMENT — ACTIVITIES OF DAILY LIVING (ADL)
ADLS_ACUITY_SCORE: 48
ADLS_ACUITY_SCORE: 48
ADLS_ACUITY_SCORE: 47
ADLS_ACUITY_SCORE: 48
ADLS_ACUITY_SCORE: 48
ADLS_ACUITY_SCORE: 47
ADLS_ACUITY_SCORE: 48
ADLS_ACUITY_SCORE: 48
ADLS_ACUITY_SCORE: 47
ADLS_ACUITY_SCORE: 48
ADLS_ACUITY_SCORE: 47
ADLS_ACUITY_SCORE: 48
ADLS_ACUITY_SCORE: 47
ADLS_ACUITY_SCORE: 48
ADLS_ACUITY_SCORE: 47
ADLS_ACUITY_SCORE: 48
ADLS_ACUITY_SCORE: 47
ADLS_ACUITY_SCORE: 48
ADLS_ACUITY_SCORE: 48

## 2025-07-24 NOTE — OP NOTE
DATE OF SURGERY: 7/23/2025    PREOPERATIVE DIAGNOSIS: Sacroiliac joint pain [M53.3]             POSTOPERATIVE DIAGNOSIS: Same    PROCEDURES:  1. Right minimally invasive SI fusion with SI Bone torque Implants and O-Arm/Stealth    PRIMARY SURGEON: Serge Ramirez MD    FIRST ASSISTANT: None.      ANESTHESIA: General Endotracheal    COMPLICATIONS:  None.    SPECIMENS: None.    ESTIMATED BLOOD LOSS: 20cc    INDICATIONS:                          Nadira Perez is a 75 year old female who elected surgical treatment, and understood the indications for this surgery, as well as its risks, benefits, and alternatives as documented in the pre-operative H&P.  Specifically, we reviewed the risks and benefits of the surgery in detail. The risks include, but are not limited to, the general risks associated with anesthesia, including death, pulmonary embolism, DVT, stroke, myocardial infarction, pneumonia, and urinary tract infection. Additional risks specific to the surgery include the risk of infection, dural tear with resultant CSF leak which might necessitate placement of a drain or revision surgery or could result in headaches, nerve injury resulting in weakness or paralysis, risk of adjacent segment disease, the risks of vascular injury, need for revision surgery in the future due to one of the above issues, or risk of incomplete symptom relief. Nadira Perez understands the risks of the surgery and wishes to proceed.  No Guarantees were given.       DESCRIPTION OF PROCEDURE:           Nadira Perez was taken to the operating room, where the Anesthesiology Service induced satisfactory general anesthesia. Ancef was given IV.  Venous thromboembolic prophylaxis was performed with sequential devices.  The patient was placed prone on an open OSI frame with the abdomen hanging free and all bony prominences well padded.  The low back was then prepped and draped in its entirety in the usual sterile fashion.  We  then held a multidisciplinary time out in which we verified the patient, procedure, antibiotics, and operative plan.  All team members were in agreement.    We began by attaching a percutaneous reference frame.  We then brought in the navigation we took our initial spin of the pelvis.  I then marked out the planned incision and made a 3 cm longitudinal incision over the right gluteal region.  I then used the navigated drill guide to pass 3 pins from the lateral table of the ilium across the SI joint and into the sacrum with 2 of the pins going in the S1 and a final pin going into S2.  We measured appropriate length implants.  The S2 corridor was the largest and we had a 75 mm implant.  The S1 corridor was 40 and 45 mm in length respectively.  These were all SI bone torque screws.  We then drilled over each of the guidewires and the screws were placed under manual power.  The guidewires were removed.  We then brought the navigation back and I verified the implant placement was all completely intraosseous.    The wound was then thoroughly irrigated.  Hemostasis was achieved.  The wound was closed in layers with vicryl suture, followed by monocryl and dermabond for the skin.  A sterile dressing was applied. The patient was turned supine, extubated, and returned to the recovery area in stable condition.      I was present and scrubbed for the entire procedure.    Serge Ramirez MD

## 2025-07-24 NOTE — PLAN OF CARE
[id number]    Goal Outcome Evaluation:      Plan of Care Reviewed With: patient    Overall Patient Progress: no changeOverall Patient Progress: no change    Pt. is A & O X 4. Makes needs known. Denies N/V, SOB or acute distress.     Pain managed with PRN Oxycodone and Dilaudid. Activity: A1 with walker/GB. to transfer. Independent with repositioning in bed. Wound dressing is CDI  Respiratory: Sats >92 % stable on room air  GI/: Continent of bladder & bowel. Diet: Reg/Thin/Whole.  Lines/Drains: Rt. PIV SL  Lab RN managed K+.   Discharge plan: TBD  Precaution: Fall risk,  Bed alarm on. Pt. Slept most of the night except during cares. Call button placed within reach. Plan of care is ongoing.

## 2025-07-24 NOTE — PLAN OF CARE
"Goal Outcome Evaluation:       75 year old female admitted 7/23 for right sacrum fusion. VSS, RA \"C-PAP at night. Alert x4, no BM since surgery, regular diet, assist of 1 w/walker. Right and left lower back incision. Right PIV, oxycodone and dilaudid for pain. Hard of hearing, full code, history: breast cancer-mastectomy. RN managed for K+, no correction needed. Plan is pain management and monitoring.                      "

## 2025-07-24 NOTE — TELEPHONE ENCOUNTER
Detailed message was left for pt, any further questions regarding appt for PM&R requested that she calls back.

## 2025-07-24 NOTE — TELEPHONE ENCOUNTER
----- Message from Lyric VARGAS sent at 2025 12:17 PM CDT -----  Regardin25 appt with Janna Duncan,     Can you please call this patient to let her know that Dr. Saldivar stated she does not need to see him for the SI Joint injection post-injection follow up since she has now also just had surgery with Dr. Ramirez and should follow up as planned with Dr. Ramirez? He did state she can follow up as need with PM&R clinic visit again in the future, but not needed if she does not need PM&R visits any longer.     Thank you,     Lyric

## 2025-07-24 NOTE — PROGRESS NOTES
[facility]    Medicine Progress Note - Hospitalist Service, GOLD TEAM 16    Date of Admission:  7/23/2025    Assessment & Plan   Nadira Perez is a 74 yo female admitted on 7/23/25.  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  She underwent right minimally invasive fusion of SI joint with Dr. Ramirez on 7/23/205.  Hospitalist service consulted for postop medical management     Bilateral SI joint pain s/p right minimally invasive fusion of SI joint (7/23/2025)  ---   POD #1  ---   He received prophylactic antibiotic  ---   PT/OT consulted    DVT prophylaxis  ---   Pneumatic compression device    Acute postop low back pain  ---   Defer to primary spine service     Hypokalemia  ---   Noted at pre-op visit.   ---   Has had poor PO intake since hernia repair.   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 Rascon. Hx of stress cardiomyopathy with EF down to 35% (2/2024), now improved to 50-55%. Per Dr. Rascon's note from 1/2025, has findings of a small inferolateral infarction but since asymptomatic, coronary CTA/invasive angiogram were deferred.   ---   Resume PTA lisinopril 20 mg BID, hydrochlorothiazide 25 mg daily, spironolactone 25 mg qPM, carvedilol 12.5 mg BID with hold parameters  ---    Continue PTA atorvastatin 80 mg qPM  ---   Has follow up with Dr. Rascon on 9/22      LOLY  ---   Resume PTA CPAP      Hypothyroidism  ---   Continue PTA levothyroxine 61 mcg daily      Prediabetes   ---   A1c 5.6 in 7/2025.   ---   Diet controlled.   ---   No need for BG checks.      Hx of incarcerated ventral hernia s/p repair  ---   Underwent MERLENE, ischemic small bowel resection with primary anastomosis and primary hernia closure on 5/4/2025 with Dr. Cintron. At increased risk for ileus or bowel obstruction.  ---   Continue bowel regimen      S/p right revision function otoplasty (6/4/20250)  ---   Follows with Plastic Surgery.  ---   Follow up with Dr. Agudelo      Hx of left sided stage IIIC inflammatory breast cancer (ER/DE positive, HER2 negative)  ---   Dx in 2007.  S/p neoadjuvant chemotherapy, bilateral mastectomy, radiation and 14 years of endocrine therapy.      Multiple hyperdense lesions within bilateral kidneys  ---   Noted on CT from 5/2025. May represent proteinaceous/hemorrhagic cysts. Recommended for nonemergent dedicated MRI renal protocol  ---   Defer to PCP as outpatient     Hyperdense left adrenal lesion  ---   Also noted on CT from 5/2025. Measuring up to 3 cm. Recommended for MRI evaluation.  ---   Defer to PCP as outpatient        Diet: Advance Diet as Tolerated: Regular Diet Adult  Discharge Instruction - Regular Diet Adult    Fong Catheter: Not present  Lines: None     Cardiac Monitoring: None  Code Status: Full Code    Disposition Plan   Medically Ready for Discharge:  Ready Now          Zoran Ley MD  Hospitalist Service, GOLD TEAM [address] [place] [address]  Securely message with [employer] (more info)  Text page via [facility] Paging/Directory   See signed in provider for up to date coverage information  ______________________________________________________________________    Interval History   No complaints  Postop pain not optimally  controlled yet  On RA without CP or SOB  No bowel bladder    Physical Exam   Vital Signs: Temp: 99.2  F (37.3  C) Temp src: Oral BP: 126/82 Pulse: 68   Resp: 16 SpO2: 95 % O2 Device: None (Room air) Oxygen Delivery: 2 LPM  Weight: 141 lbs 15.62 oz  General: aao x 3, NAD.  HEENT:  NC/AT, PERRL, EOMI, neck supple, no thyromegaly, op clear, mmm.  CVS:  NL s 1 and s2, no m/r/g.  Lungs:  CTA B/L.   Abd:  Soft, + bs, NT, no rebound or gaurding, no fluid shift.  Ext:  No c/c.  Lymph:  No edema.  Neuro:  Nonfocal.  Musculoskeletal: No calf tenderness to palpation.    Skin:  No rash.  Psychiatry:  Mood and affect appropriate.      Data     I have personally reviewed the following data over the past 24 hrs:    N/A  \   11.0 (L)   / N/A     139 100 22.8 /  116 (H)   3.5 26 0.66 \       Imaging results reviewed over the past 24 hrs:   No results found for this or any previous visit (from the past 24 hours).

## 2025-07-24 NOTE — PROGRESS NOTES
"Orthopaedic Surgery Progress Note:       Subjective:   No acute events overnight. Patient reports doing well. Soreness in lower back.   She requests changes to her opioid as she feels the oxycodone has been ineffective. Discussed she has previously used vicodin but is open to alternatives.     Objective:   /85 (BP Location: Right arm)   Pulse 76   Temp 98.8  F (37.1  C) (Oral)   Resp 18   Ht 1.651 m (5' 5\")   Wt 64.4 kg (141 lb 15.6 oz)   LMP  (LMP Unknown)   SpO2 94%   BMI 23.63 kg/m    No intake/output data recorded.  Gen: NAD. Resting comfortably in bed  Resp: Breathing comfortably on RA  : Fong in place    Dressings clean and dry, rolling to left pin dressing. No oozing, bleeding or edema.     Cervical spine:    Appearance -no gross step-offs, kyphosis.    Motor -     C5: Deltoids R 5/5 and L 5/5 strength    C6: Biceps R 5/5 and L 5/5 strength     C7: Triceps R 5/5 and L 5/5 strength     C8:  R 5/5 and L 5/5 strength     T1: Dorsal interossei R 5/5 and L 5/5 strength        Sensation: intact to light touch in C5-T1       Lumbar Spine:    Appearance - No gross stepoffs or deformities    Motor -     L2-3: Hip flexion 5/5 R and 5/5 L strength          L3/4:  Knee extension R 5/5 and L 5/5 strength         L4/5:  Foot dorsiflexion R 5/5 L 5/5 and       EHL dorsiflexion R 5/5 L 5/5 strength         S1:  Plantarflexion/Peroneal Muscles  R 5/5 and L 5/5 strength    Sensation: intact to light touch L3-S1 distribution BLE        Labs:  Lab Results   Component Value Date    WBC 9.2 07/17/2025    HGB 11.0 (L) 07/24/2025     07/17/2025    INR 1.09 05/04/2025        Assessment & Plan:   Assessment and Plan: Nadira Perez is a 75 year old female now s/p Right SI Jt Fusion on 7/23/2024 with Dr. Ramirez.     Ortho Primary  Activity:   - Up with assist until independent. No excessive bending or twisting. No lifting >10 lbs x 6 weeks.   - No Barbara lift  Weight bearing status: Touch down weight " bearing only. NO THERAPIES FOR 6 WEEKS.  - Rotated oxy to PO dilaudid. Will monitor for effectiveness & plan for discharge home with brother tomorrow.     Pain management:   - Transition to PO narcotics as tolerated. No NSAIDs x 3 months.   Antibiotics: Ancef x 24 hours.  Diet: Begin with clear fluids and progress diet as tolerated.   DVT prophylaxis: SCDs only. No chemical DVT ppx needed.  Imaging: XR Pelvis PTDC - ordered.  Labs: None  Bracing/Splinting: None.  Dressings: Keep Aquacel c/d/i x 7 days.  Drains: No drains  Fong catheter: None  Cultures: none.    Consults: Hospitalist.  Follow-up: Clinic with Dr. Ramirez in 6 weeks with repeat x-rays.   Disposition: Pending progress with therapies, pain control on orals, and medical stability, anticipate discharge to home today vs tomorrow.    Dong Evans MD  Spine Fellow    Addendum completed by MERCEDES Lindo

## 2025-07-24 NOTE — PHARMACY-ADMISSION MEDICATION HISTORY
Pharmacist Admission Medication History    Admission medication history is complete. The information provided in this note is only as accurate as the sources available at the time of the update.    Medication reconciliation/reorder completed by provider prior to medication history? No    Information Source(s): Patient, Patient's pharmacy, and Clinic records via in-person    Pertinent Information: Discussed medication history with patient and also referenced MTM pharmacy note from 6/13/2025. Patient reported to not have started taking omeprazole or famotidine. Patient is not currently taking methocarbamol but would like this medication     Changes made to PTA medication list:  Added: None  Deleted: None  Changed:   Diclofenac from four times daily TO as needed  Levothyroxine from 61mcg TO 56mcg (consistent with dispense history and MTM note)    Allergies reviewed with patient and updates made in EHR: yes    Medication History Completed By: JAMIE MENENDEZ RPH 7/23/2025 8:18 PM    Prior to Admission medications   Medication Sig Last Dose Taking? Auth Provider Long Term End Date   acetaminophen (TYLENOL) 500 MG tablet Take 1,000 mg by mouth 3 times daily 7/23/2025 at  7:00 AM Yes Reported, Patient     artificial tears OINT ophthalmic ointment Place into both eyes nightly as needed for dry eyes. 7/22/2025 Yes Reported, Patient     atorvastatin (LIPITOR) 80 MG tablet Take 1 tablet (80 mg) by mouth daily.  Patient taking differently: Take 80 mg by mouth at bedtime. 7/22/2025 Yes Matilde Quiñonez APRN CNP Yes    carvedilol (COREG) 12.5 MG tablet Take 1 tablet (12.5 mg) by mouth 2 times daily (with meals). 7/23/2025 at 10:40 AM Yes Matilde Quiñonez APRN CNP Yes    CRANBERRY EXTRACT PO Take 500 mg by mouth every morning Past Week Yes Reported, Patient     diclofenac (VOLTAREN) 1 % topical gel Apply 2 g topically 4 times daily  Patient taking differently: Apply 2 g topically as needed for moderate pain. Past Week Yes Jarvis  MD Rolan     docusate sodium (COLACE) 100 MG capsule Take 1 capsule (100 mg) by mouth 2 times daily.  Patient taking differently: Take 100 mg by mouth 2 times daily as needed. Past Week Yes Juan Agudelo MD     gabapentin (NEURONTIN) 300 MG capsule Take 4 capsules (1,200 mg) by mouth 3 times daily. 7/23/2025 at  7:00 AM Yes Matilde Quiñonez APRN CNP Yes    hydrochlorothiazide (HYDRODIURIL) 25 MG tablet Take 1 tablet (25 mg) by mouth daily.  Patient taking differently: Take 25 mg by mouth every morning. Past Week Yes Matilde Quiñonez APRN CNP Yes    levothyroxine (SYNTHROID/LEVOTHROID) 112 MCG tablet TAKE 1/2 TAB BY MOUTH EVERY DAY  Patient taking differently: Take 56 mcg by mouth every morning. TAKE 1/2 TAB BY MOUTH EVERY DAY 7/23/2025 at  5:00 AM Yes Matilde Quiñonez APRN CNP Yes    lisinopril (ZESTRIL) 20 MG tablet Take 1 tablet (20 mg) by mouth 2 times daily. Past Week Yes Matilde Quiñonez APRN CNP Yes    MAGNESIUM GLYCINATE PO Take 800 mg by mouth at bedtime. Past Week Yes Reported, Patient     Melatonin 10 MG TABS tablet Take 10 mg by mouth nightly as needed. Past Week Yes Reported, Patient     Multiple Vitamin (MULTI-VITAMIN) per tablet Take 1 tablet by mouth every morning 7/22/2025 Yes Reported, Patient     oxyCODONE (ROXICODONE) 5 MG tablet Take 1 tablet (5 mg) by mouth every 6 hours as needed for severe pain. #90 tabs to last 30 days. May fill 7/2/25 7/23/2025 at  5:00 AM Yes Renée Harris APRN CNP     potassium chloride cyndi ER (KLOR-CON M20) 20 MEQ CR tablet Take 1 tablet (20 mEq) by mouth daily. Past Week Yes Zuri Trejo APRN CNP     spironolactone (ALDACTONE) 25 MG tablet Take 1 tablet (25 mg) by mouth daily at 2 pm.  Patient taking differently: Take 25 mg by mouth every morning. Past Week Yes Cinthia Rascon MD Yes    tiZANidine (ZANAFLEX) 4 MG tablet Take 1 tablet (4 mg) by mouth at bedtime. 7/23/2025 at  5:00 AM Yes Matilde Quiñonez APRN CNP No    famotidine  (PEPCID) 20 MG tablet Take 1 tablet (20 mg) by mouth 2 times daily as needed (heartburn).  Patient not taking: Reported on 7/17/2025 Unknown  Matilde Quiñonez APRN CNP     HEMP OIL OR EXTRACT OR OTHER CBD CANNABINOID, NOT MEDICAL CANNABIS, THC Unknown  Reported, Patient     methocarbamol (ROBAXIN) 500 MG tablet Take 1 tablet (500 mg) by mouth every 8 hours as needed for muscle spasms.  Patient not taking: Reported on 7/17/2025 Unknown  Jacobo Cintron,      naloxone (NARCAN) 4 MG/0.1ML nasal spray Spray 1 spray (4 mg) into one nostril alternating nostrils as needed for opioid reversal. every 2-3 minutes until assistance arrives   Grosse, Renée, APRN CNP Yes    omeprazole (PRILOSEC) 20 MG DR capsule Take 1 capsule (20 mg) by mouth daily.  Patient not taking: Reported on 7/17/2025 Unknown  Matilde Quiñonez APRN CNP     ondansetron (ZOFRAN) 4 MG tablet Take 1 tablet (4 mg) by mouth every 6 hours as needed for nausea. Unknown  Juan Agudelo MD     Turmeric 500 MG CAPS Take 500 mg by mouth every morning  Patient not taking: Reported on 7/17/2025 Unknown  Reported, Patient

## 2025-07-25 ENCOUNTER — APPOINTMENT (OUTPATIENT)
Dept: GENERAL RADIOLOGY | Facility: CLINIC | Age: 75
DRG: 451 | End: 2025-07-25
Attending: STUDENT IN AN ORGANIZED HEALTH CARE EDUCATION/TRAINING PROGRAM
Payer: COMMERCIAL

## 2025-07-25 VITALS
HEART RATE: 74 BPM | TEMPERATURE: 99.4 F | SYSTOLIC BLOOD PRESSURE: 141 MMHG | RESPIRATION RATE: 16 BRPM | HEIGHT: 65 IN | WEIGHT: 141.98 LBS | BODY MASS INDEX: 23.65 KG/M2 | OXYGEN SATURATION: 96 % | DIASTOLIC BLOOD PRESSURE: 102 MMHG

## 2025-07-25 LAB
GLUCOSE BLDC GLUCOMTR-MCNC: 112 MG/DL (ref 70–99)
HOLD SPECIMEN: NORMAL
POTASSIUM SERPL-SCNC: 4 MMOL/L (ref 3.4–5.3)

## 2025-07-25 PROCEDURE — 250N000013 HC RX MED GY IP 250 OP 250 PS 637: Performed by: STUDENT IN AN ORGANIZED HEALTH CARE EDUCATION/TRAINING PROGRAM

## 2025-07-25 PROCEDURE — 250N000013 HC RX MED GY IP 250 OP 250 PS 637: Performed by: NURSE PRACTITIONER

## 2025-07-25 PROCEDURE — 84132 ASSAY OF SERUM POTASSIUM: CPT | Performed by: INTERNAL MEDICINE

## 2025-07-25 PROCEDURE — 99232 SBSQ HOSP IP/OBS MODERATE 35: CPT | Performed by: INTERNAL MEDICINE

## 2025-07-25 PROCEDURE — 250N000013 HC RX MED GY IP 250 OP 250 PS 637: Performed by: PHYSICIAN ASSISTANT

## 2025-07-25 PROCEDURE — 999N000065 XR PELVIS 1/2 VIEWS

## 2025-07-25 PROCEDURE — 250N000013 HC RX MED GY IP 250 OP 250 PS 637: Performed by: ORTHOPAEDIC SURGERY

## 2025-07-25 PROCEDURE — 36415 COLL VENOUS BLD VENIPUNCTURE: CPT | Performed by: INTERNAL MEDICINE

## 2025-07-25 RX ADMIN — GABAPENTIN 1200 MG: 400 CAPSULE ORAL at 08:10

## 2025-07-25 RX ADMIN — GABAPENTIN 1200 MG: 400 CAPSULE ORAL at 14:21

## 2025-07-25 RX ADMIN — ACETAMINOPHEN 1000 MG: 500 TABLET ORAL at 08:10

## 2025-07-25 RX ADMIN — Medication 3 MG: at 01:31

## 2025-07-25 RX ADMIN — LIDOCAINE 2 PATCH: 4 PATCH TOPICAL at 08:10

## 2025-07-25 RX ADMIN — SENNOSIDES AND DOCUSATE SODIUM 1 TABLET: 50; 8.6 TABLET ORAL at 08:10

## 2025-07-25 RX ADMIN — POLYETHYLENE GLYCOL 3350 17 G: 17 POWDER, FOR SOLUTION ORAL at 08:10

## 2025-07-25 RX ADMIN — CARVEDILOL 12.5 MG: 12.5 TABLET, FILM COATED ORAL at 08:10

## 2025-07-25 RX ADMIN — ACETAMINOPHEN 1000 MG: 500 TABLET ORAL at 14:21

## 2025-07-25 RX ADMIN — Medication 3 MG: at 12:06

## 2025-07-25 RX ADMIN — Medication 3 MG: at 06:36

## 2025-07-25 RX ADMIN — POTASSIUM CHLORIDE 20 MEQ: 1500 TABLET, EXTENDED RELEASE ORAL at 08:10

## 2025-07-25 RX ADMIN — Medication 56 MCG: at 08:10

## 2025-07-25 ASSESSMENT — ACTIVITIES OF DAILY LIVING (ADL)
ADLS_ACUITY_SCORE: 48

## 2025-07-25 NOTE — PROGRESS NOTES
[facility]    Medicine Progress Note - Hospitalist Service, GOLD TEAM 16    Date of Admission:  7/23/2025    Assessment & Plan   Nadira Perez is a 74 yo female admitted on 7/23/25.  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  She underwent right minimally invasive fusion of SI joint with Dr. Ramirez on 7/23/205.  Hospitalist service consulted for postop medical management     Bilateral SI joint pain s/p right minimally invasive fusion of SI joint (7/23/2025)  ---   POD #2  ---   He received prophylactic antibiotic  ---   PT/OT consulted    DVT prophylaxis  ---   Pneumatic compression device    Acute postop low back pain  ---   Defer to primary spine service     Hypokalemia  ---   Noted at pre-op visit.   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 Rascon. Hx of stress cardiomyopathy with EF down to 35% (2/2024), now improved to 50-55%. Per Dr. Rascon's note from 1/2025, has findings of a small inferolateral infarction but since asymptomatic, coronary CTA/invasive angiogram were deferred.   ---   Resume PTA lisinopril 20 mg BID, hydrochlorothiazide 25 mg daily, spironolactone 25 mg qPM, carvedilol 12.5 mg BID with hold parameters  ---   Continue PTA atorvastatin 80 mg qPM  ---   Has  follow up with Dr. Rascon on 9/22      LOLY  ---   Resume PTA CPAP      Hypothyroidism  ---   Continue PTA levothyroxine 61 mcg daily      Prediabetes   ---   A1c 5.6 in 7/2025.   ---   Diet controlled.   ---   No need for BG checks.      Hx of incarcerated ventral hernia s/p repair  ---   Underwent MERLENE, ischemic small bowel resection with primary anastomosis and primary hernia closure on 5/4/2025 with Dr. Cintron. At increased risk for ileus or bowel obstruction.  ---   Continue bowel regimen      S/p right revision function otoplasty (6/4/20250)  ---   Follows with Plastic Surgery.  ---   Follow up with Dr. Agudelo      Hx of left sided stage IIIC inflammatory breast cancer (ER/OK positive, HER2 negative)  ---   Dx in 2007.  S/p neoadjuvant chemotherapy, bilateral mastectomy, radiation and 14 years of endocrine therapy.      Multiple hyperdense lesions within bilateral kidneys  ---   Noted on CT from 5/2025. May represent proteinaceous/hemorrhagic cysts. Recommended for nonemergent dedicated MRI renal protocol  ---   Defer to PCP as outpatient     Hyperdense left adrenal lesion  ---   Also noted on CT from 5/2025. Measuring up to 3 cm. Recommended for MRI evaluation.  ---   Defer to PCP as outpatient        Diet: Advance Diet as Tolerated: Regular Diet Adult  Discharge Instruction - Regular Diet Adult    Fong Catheter: Not present  Lines: None     Cardiac Monitoring: None  Code Status: Full Code    Disposition Plan   Medically Ready for Discharge:  Ready Now           Zoran Ley MD  Hospitalist Service, GOLD TEAM [address] [place] [address]  Securely message with "[employer] (more info)  Text page via [employer] Paging/Directory   See signed in provider for up to date coverage information  ______________________________________________________________________    Interval History   No complaints  Postop pain not optimally controlled yet  On RA without CP or SOB  No bowel  bladder    Physical Exam   Vital Signs: Temp: 99.4  F (37.4  C) Temp src: Oral BP: (!) 141/102 Pulse: 74   Resp: 16 SpO2: 96 % O2 Device: None (Room air)    Weight: 141 lbs 15.62 oz  General: aao x 3, NAD.  HEENT:  NC/AT, PERRL, EOMI, neck supple, no thyromegaly, op clear, mmm.  CVS:  NL s 1 and s2, no m/r/g.  Lungs:  CTA B/L.   Abd:  Soft, + bs, NT, no rebound or gaurding, no fluid shift.  Ext:  No c/c.  Lymph:  No edema.  Neuro:  Nonfocal.  Musculoskeletal: No calf tenderness to palpation.    Skin:  No rash.  Psychiatry:  Mood and affect appropriate.      Data     I have personally reviewed the following data over the past 24 hrs:    N/A  \   N/A   / N/A     N/A N/A N/A /  112 (H)   4.0 N/A N/A \       Imaging results reviewed over the past 24 hrs:   Recent Results (from the past 24 hours)   XR Pelvis 1/2 Views    Narrative    EXAM: XR PELVIS 1/2 VIEWS  LOCATION: [facility]  DATE: 7/25/2025    INDICATION: Evaluate postop pelvic hardware.  COMPARISON: None.      Impression    IMPRESSION: Status post right sacroiliac joint fusion. Status post lumbosacral spinal fusion. Advanced multilevel degenerative disc disease changes in the lumbar spine.

## 2025-07-25 NOTE — PROGRESS NOTES
Social Work Progress Note    Social work informed in IDT rounds that patient was discharging to home today with no needs. Care management previously was not following. When giving IMM, patient asked CHW about getting meals sent to the home for a couple weeks (i.e., [employer]). Provided resources on AVS. SW informed by bedside RN that patient was asking to see me. Met with patient at bedside who stated last time she had a surgery, she was provided with [employer]. Informed patient that I was unsure how to set this up as I typically provide the info to patients and they do this on their own. Offered to reach out to [employer] to inquire about the process. SW spoke with [employer] and was deferred to patients insurance company. When speaking with the insurance company, they informed me that patient did not have a benefit to receive meals in the home. Updated patient who stated she just used this benefit recently and was not understanding why she no longer had this benefit. I encouraged her to call her insurance company or speak with her  if she had one. Patient reported she has a CM through her insurance and stated she will reach out to them once she gets home.     Additionally, patient asked provider if she could get a wheelchair. While on the phone with insurance, I verified that patient has benefits to get a wheelchair covered through insurance. RNCC (Ashley) working on ordering a w/c which will be delivered to the patients home likely by tomorrow. Updated patient on everything at bedside. Patient will be discharging to home today.     No additional care management needs.     SUSI Lopez, LGSW  5 Med Surg   [facility] Acute Care Management   Phone: [phone]  Available on Vocera: 5MS MILANA

## 2025-07-25 NOTE — PROGRESS NOTES
"Orthopaedic Surgery Progress Note:       Subjective:   No acute events overnight. Patient reports doing well. Soreness in lower back. She reqwuested a wheelchair to be ordered at the time of discharge. No new neurological symptoms.    Objective:   BP (!) 141/102 (BP Location: Right arm, Patient Position: Sitting, Cuff Size: Adult Small)   Pulse 74   Temp 99.4  F (37.4  C) (Oral)   Resp 16   Ht 1.651 m (5' 5\")   Wt 64.4 kg (141 lb 15.6 oz)   LMP  (LMP Unknown)   SpO2 96%   BMI 23.63 kg/m    No intake/output data recorded.  Gen: NAD. Resting comfortably in bed  Resp: Breathing comfortably on RA  : Fong in place    Dressings clean and dry, rolling to left pin dressing. No oozing, bleeding or edema.     Cervical spine:    Appearance -no gross step-offs, kyphosis.    Motor -     C5: Deltoids R 5/5 and L 5/5 strength    C6: Biceps R 5/5 and L 5/5 strength     C7: Triceps R 5/5 and L 5/5 strength     C8:  R 5/5 and L 5/5 strength     T1: Dorsal interossei R 5/5 and L 5/5 strength        Sensation: intact to light touch in C5-T1       Lumbar Spine:    Appearance - No gross stepoffs or deformities    Motor -     L2-3: Hip flexion 5/5 R and 5/5 L strength          L3/4:  Knee extension R 5/5 and L 5/5 strength         L4/5:  Foot dorsiflexion R 5/5 L 5/5 and       EHL dorsiflexion R 5/5 L 5/5 strength         S1:  Plantarflexion/Peroneal Muscles  R 5/5 and L 5/5 strength    Sensation: intact to light touch L3-S1 distribution BLE        Labs:  Lab Results   Component Value Date    WBC 9.2 07/17/2025    HGB 11.0 (L) 07/24/2025     07/17/2025    INR 1.09 05/04/2025        Assessment & Plan:   Assessment and Plan: Nadira Perez is a 75 year old female now s/p Right SI Jt Fusion on 7/23/2024 with Dr. Ramirez.     Patient and I reviewed her XR and wheelchair was ordered. She does not need this prior to discharge, as one can be sent to her home.     Ortho Primary  Activity:   - Up with assist until " independent. No excessive bending or twisting. No lifting >10 lbs x 6 weeks.   - No Barbara lift  Weight bearing status: Touch down weight bearing only. NO THERAPIES FOR 6 WEEKS.  - Rotated oxy to PO dilaudid. Will monitor for effectiveness & plan for discharge home with brother tomorrow.     Pain management:   - Transition to PO narcotics as tolerated. No NSAIDs x 3 months.   Antibiotics: Ancef x 24 hours.  Diet: Begin with clear fluids and progress diet as tolerated.   DVT prophylaxis: SCDs only. No chemical DVT ppx needed.  Imaging: XR Pelvis PTDC - ordered.  Labs: None  Bracing/Splinting: None.  Dressings: Keep Aquacel c/d/i x 7 days.  Drains: No drains  Fong catheter: None  Cultures: none.    Consults: Hospitalist.  Follow-up: Clinic with Dr. Ramirez in 6 weeks with repeat x-rays.   Disposition: Pending progress with therapies, pain control on orals, and medical stability, anticipate discharge to home today.    Dong Evans MD  Spine Fellow    Addendum completed by MERCEDES Arrington

## 2025-07-25 NOTE — DISCHARGE INSTRUCTIONS
COMMUNITY RESOURCES  Help at Your Door - A grocery and delivery service for homebound seniors in the [facility] area: [employer]    [employer] - A [facility]-based website that helps caregivers manage care for their loved ones through offering scheduling tools and home-care resources all in one place: careXooker.[employer]     [employer] (formerly [employer] Friends of the Elderly) - An organization dedicated to reducing isolation and loneliness while promoting well-being and independence among older adults in the [facility]: [employer]    Senior Link/Senior Linkage Line - Provides access to [facility] community resources for seniors, their families, and caregivers: http://www.[employer].info/public/default.aspx?se=senior or 1-483.635.9359    Supplemental Nutrition Assistance Program (SNAP) - The SNAP program in [place] is a [facility]-run federal nutrition program that helps people get the foods they need to maintain a healthy diet: SNAP [place] website. SNAP can also be used to help cover the cost of home-delivered meals. Learn more about Meals on Wheels and SNAP.    Living at Home Network - For more than 30 years, non-profit, community-owned Living at Home/Block Nurse Programs have helped seniors stay healthy, safe and independent while living in their own homes: www.lahnetwork.org    [facility] for Independent Living - Provides people with disabilities with resources for independent living: www.mcil-[place].org    Moms Meals -- https://www.[employer]/

## 2025-07-25 NOTE — PLAN OF CARE
Goal Outcome Evaluation:      Plan of Care Reviewed With: patient    Overall Patient Progress: no changeOverall Patient Progress: no change     Pt's pain managed with PRN dilaudid, alert and oriented x 4, on a room air, continent of bowel and bladder, last BM was on the 7/23/25,  pt is on assist of 1 with walker and gait belt. Pt slept well overnight, might be discharge today, pt denied chest pain, SOB, N/V, no acute situation noted this shift. Continue pt's monitoring.

## 2025-07-25 NOTE — PLAN OF CARE
"Goal Outcome Evaluation:        75 year old female admitted 7/23 for right sacrum fusion. VSS, RA \"C-PAP at night. Alert x4, regular diet, assist of 1 w/walker. Right and left lower back incision. Right PIV, oxycodone and dilaudid for pain. Hard of hearing, full code, history: breast cancer-mastectomy. RN managed for K+, no correction needed. Plan is pain management and monitoring.    Patient was provided discharge medication, discharge paperwork, and discharge education. All PIV's were removed patient discharged to home transported by family member.                              "

## 2025-07-25 NOTE — PROGRESS NOTES
Care Management Follow Up    Writer met with patient prior to discharge to discuss ordering a wheelchair. Patient stated she prefers to pick out her own wheelchair. Patient stated she will use a friend's wheelchair this weekend and then go to pick out a wheel chair best suited for her. Patient stated no other discharge concerns.        KAVIN MahoneyN, RN, PHN  Float Nurse Care Coordinator  [facility] Acute Care Management  Searchable on Vocera (today only): 5 Med Surg RNCC

## 2025-07-27 ENCOUNTER — MYC MEDICAL ADVICE (OUTPATIENT)
Dept: ORTHOPEDICS | Facility: CLINIC | Age: 75
End: 2025-07-27
Payer: COMMERCIAL

## 2025-07-27 DIAGNOSIS — M53.3 BACK PAIN, SACROILIAC: ICD-10-CM

## 2025-07-27 DIAGNOSIS — M48.062 SPINAL STENOSIS OF LUMBAR REGION WITH NEUROGENIC CLAUDICATION: ICD-10-CM

## 2025-07-27 DIAGNOSIS — M53.3 SACROILIAC JOINT PAIN: ICD-10-CM

## 2025-07-27 DIAGNOSIS — Z98.1 S/P SPINAL FUSION: Primary | ICD-10-CM

## 2025-07-29 ENCOUNTER — PATIENT OUTREACH (OUTPATIENT)
Dept: CARE COORDINATION | Facility: CLINIC | Age: 75
End: 2025-07-29
Payer: COMMERCIAL

## 2025-07-29 RX ORDER — HYDROMORPHONE HYDROCHLORIDE 2 MG/1
2 TABLET ORAL EVERY 6 HOURS PRN
Qty: 28 TABLET | Refills: 0 | Status: SHIPPED | OUTPATIENT
Start: 2025-07-29 | End: 2025-08-05

## 2025-07-29 NOTE — PROGRESS NOTES
Clinic Care Coordination Contact  Situation: Patient chart reviewed by RN.    Background: Patient identified via recently discharged list.     Assessment: Per chart review, patient had discussed post hospital discharge in detail with orthopedic team.    Plan/Recommendations: RN will not complete TCM call due to duplication of outreach.    GRZEGORZ BRAVO RN on 7/29/2025 at 10:44 AM

## 2025-07-29 NOTE — TELEPHONE ENCOUNTER
RN called patient to discuss her condition and needs. Patient's activity restrictions were discuss She is to do 3 weeks of weightbearing touchdown only. Patient verbalized understanding and frustration as she has 2 fused wrists and this poses a significant challenge with these restrictions. RN wrote home care order and they will be contacting patient soon. Patient also needing a refill of her medication. She is only taking 1 tablet at a time, often stretching it out 5-6 hours. She has friends that come to help her at home. She will call her insurance about meals.     Micaela Pack RN

## 2025-07-31 NOTE — TELEPHONE ENCOUNTER
See My Chart messages from pt.    I called pt who stated pt never heard from [facility] home care but [facility] & they will accept pt.  Faxed order to fax# pt provided.    Pt stated pain controlled & has enough pain meds for weekend.  Call back prn.  Pt agreed.    Jennifer Peck RN.

## 2025-08-04 ENCOUNTER — DOCUMENTATION ONLY (OUTPATIENT)
Dept: INTERNAL MEDICINE | Facility: CLINIC | Age: 75
End: 2025-08-04
Payer: COMMERCIAL

## 2025-08-04 ENCOUNTER — VIRTUAL VISIT (OUTPATIENT)
Dept: ONCOLOGY | Facility: CLINIC | Age: 75
End: 2025-08-04
Attending: PHYSICIAN ASSISTANT
Payer: COMMERCIAL

## 2025-08-04 VITALS — BODY MASS INDEX: 23.66 KG/M2 | WEIGHT: 142 LBS | HEIGHT: 65 IN

## 2025-08-04 DIAGNOSIS — Z17.0 MALIGNANT NEOPLASM OF BREAST IN FEMALE, ESTROGEN RECEPTOR POSITIVE, UNSPECIFIED LATERALITY, UNSPECIFIED SITE OF BREAST (H): Primary | ICD-10-CM

## 2025-08-04 DIAGNOSIS — R63.4 WEIGHT LOSS: ICD-10-CM

## 2025-08-04 DIAGNOSIS — R26.89 BALANCE PROBLEMS: ICD-10-CM

## 2025-08-04 DIAGNOSIS — C50.919 MALIGNANT NEOPLASM OF BREAST IN FEMALE, ESTROGEN RECEPTOR POSITIVE, UNSPECIFIED LATERALITY, UNSPECIFIED SITE OF BREAST (H): Primary | ICD-10-CM

## 2025-08-04 PROCEDURE — 98007 SYNCH AUDIO-VIDEO EST HI 40: CPT | Performed by: PHYSICIAN ASSISTANT

## 2025-08-04 PROCEDURE — 1125F AMNT PAIN NOTED PAIN PRSNT: CPT | Mod: 95 | Performed by: PHYSICIAN ASSISTANT

## 2025-08-04 ASSESSMENT — PAIN SCALES - GENERAL: PAINLEVEL_OUTOF10: MODERATE PAIN (5)

## 2025-08-05 ENCOUNTER — PATIENT OUTREACH (OUTPATIENT)
Dept: CARE COORDINATION | Facility: CLINIC | Age: 75
End: 2025-08-05
Payer: COMMERCIAL

## 2025-08-05 ENCOUNTER — MEDICAL CORRESPONDENCE (OUTPATIENT)
Dept: HEALTH INFORMATION MANAGEMENT | Facility: CLINIC | Age: 75
End: 2025-08-05
Payer: COMMERCIAL

## 2025-08-06 ENCOUNTER — TELEPHONE (OUTPATIENT)
Dept: INTERNAL MEDICINE | Facility: CLINIC | Age: 75
End: 2025-08-06
Payer: COMMERCIAL

## 2025-08-07 ENCOUNTER — PATIENT OUTREACH (OUTPATIENT)
Dept: CARE COORDINATION | Facility: CLINIC | Age: 75
End: 2025-08-07
Payer: COMMERCIAL

## 2025-08-09 DIAGNOSIS — K21.9 GASTRIC REFLUX: ICD-10-CM

## 2025-08-09 ASSESSMENT — PAIN SCALES - PAIN ENJOYMENT GENERAL ACTIVITY SCALE (PEG)
AVG_PAIN_PASTWEEK: 5
PEG_TOTALSCORE: 7
INTERFERED_ENJOYMENT_LIFE: 7
INTERFERED_GENERAL_ACTIVITY: 9

## 2025-08-12 ENCOUNTER — VIRTUAL VISIT (OUTPATIENT)
Dept: ANESTHESIOLOGY | Facility: CLINIC | Age: 75
End: 2025-08-12
Payer: COMMERCIAL

## 2025-08-12 ENCOUNTER — MYC MEDICAL ADVICE (OUTPATIENT)
Dept: INTERNAL MEDICINE | Facility: CLINIC | Age: 75
End: 2025-08-12

## 2025-08-12 DIAGNOSIS — R26.89 BALANCE PROBLEM: ICD-10-CM

## 2025-08-12 DIAGNOSIS — M53.3 PAIN OF BOTH SACROILIAC JOINTS: ICD-10-CM

## 2025-08-12 DIAGNOSIS — M54.17 LUMBOSACRAL RADICULOPATHY: ICD-10-CM

## 2025-08-12 DIAGNOSIS — G89.4 CHRONIC PAIN SYNDROME: ICD-10-CM

## 2025-08-12 DIAGNOSIS — M53.3 BACK PAIN, SACROILIAC: Primary | ICD-10-CM

## 2025-08-12 DIAGNOSIS — M21.371 RIGHT FOOT DROP: ICD-10-CM

## 2025-08-12 DIAGNOSIS — G62.9 PERIPHERAL POLYNEUROPATHY: ICD-10-CM

## 2025-08-12 DIAGNOSIS — R29.898 RIGHT LEG WEAKNESS: ICD-10-CM

## 2025-08-12 DIAGNOSIS — Z79.891 ENCOUNTER FOR MONITORING OPIOID MAINTENANCE THERAPY: Primary | ICD-10-CM

## 2025-08-12 DIAGNOSIS — Z51.81 ENCOUNTER FOR MONITORING OPIOID MAINTENANCE THERAPY: Primary | ICD-10-CM

## 2025-08-12 DIAGNOSIS — M53.3 SACROILIAC JOINT PAIN: ICD-10-CM

## 2025-08-12 PROCEDURE — 98006 SYNCH AUDIO-VIDEO EST MOD 30: CPT

## 2025-08-12 RX ORDER — OMEPRAZOLE 20 MG/1
20 CAPSULE, DELAYED RELEASE ORAL DAILY
Qty: 90 CAPSULE | Refills: 2 | Status: SHIPPED | OUTPATIENT
Start: 2025-08-12

## 2025-08-19 ENCOUNTER — VIRTUAL VISIT (OUTPATIENT)
Dept: ONCOLOGY | Facility: CLINIC | Age: 75
End: 2025-08-19
Attending: STUDENT IN AN ORGANIZED HEALTH CARE EDUCATION/TRAINING PROGRAM
Payer: COMMERCIAL

## 2025-08-19 VITALS — HEIGHT: 65 IN | BODY MASS INDEX: 23.49 KG/M2 | WEIGHT: 141 LBS

## 2025-08-19 DIAGNOSIS — C50.919 MALIGNANT NEOPLASM OF BREAST IN FEMALE, ESTROGEN RECEPTOR POSITIVE, UNSPECIFIED LATERALITY, UNSPECIFIED SITE OF BREAST (H): Primary | ICD-10-CM

## 2025-08-19 DIAGNOSIS — M21.371 RIGHT FOOT DROP: ICD-10-CM

## 2025-08-19 DIAGNOSIS — G62.9 PERIPHERAL POLYNEUROPATHY: ICD-10-CM

## 2025-08-19 DIAGNOSIS — Z17.0 MALIGNANT NEOPLASM OF BREAST IN FEMALE, ESTROGEN RECEPTOR POSITIVE, UNSPECIFIED LATERALITY, UNSPECIFIED SITE OF BREAST (H): Primary | ICD-10-CM

## 2025-08-19 DIAGNOSIS — I89.0 LYMPHEDEMA: ICD-10-CM

## 2025-08-19 DIAGNOSIS — R26.89 BALANCE PROBLEMS: ICD-10-CM

## 2025-08-19 PROCEDURE — 98007 SYNCH AUDIO-VIDEO EST HI 40: CPT | Performed by: STUDENT IN AN ORGANIZED HEALTH CARE EDUCATION/TRAINING PROGRAM

## 2025-08-19 PROCEDURE — 1125F AMNT PAIN NOTED PAIN PRSNT: CPT | Mod: 95 | Performed by: STUDENT IN AN ORGANIZED HEALTH CARE EDUCATION/TRAINING PROGRAM

## 2025-08-19 ASSESSMENT — PAIN SCALES - GENERAL: PAINLEVEL_OUTOF10: MODERATE PAIN (5)

## 2025-09-04 DIAGNOSIS — M53.3 BACK PAIN, SACROILIAC: ICD-10-CM

## 2025-09-04 DIAGNOSIS — Z98.1 S/P SPINAL FUSION: Primary | ICD-10-CM

## 2025-09-04 DIAGNOSIS — M53.3 SACROILIAC JOINT PAIN: ICD-10-CM

## (undated) DEVICE — BNDG KLING 2" 2231

## (undated) DEVICE — STPL RELOAD LINEAR CUT 100X3.8MM TCR10

## (undated) DEVICE — GLOVE PROTEXIS POWDER FREE SMT 6.5  2D72PT65X

## (undated) DEVICE — EYE PREP BETADINE 5% SOLUTION 30ML 0065-0411-30

## (undated) DEVICE — SU VICRYL 4-0 P-3 18" UND  J494H

## (undated) DEVICE — PREP CHLORAPREP 26ML TINTED HI-LITE ORANGE 930815

## (undated) DEVICE — SURGICEL HEMOSTAT 2X14" 1951

## (undated) DEVICE — TAPE MICROPORE 1"X1.5YD 1530S-1

## (undated) DEVICE — CELL SAVER

## (undated) DEVICE — SPONGE COTTONOID 1X3" 80-1408

## (undated) DEVICE — STPL SKIN 35W ROTATING HEAD PRW35

## (undated) DEVICE — DRAPE STERI TOWEL LG 1010

## (undated) DEVICE — PREP POVIDONE IODINE SOLUTION 10% 4OZ BOTTLE 29906-004

## (undated) DEVICE — SOL ISOPROPYL RUBBING ALCOHOL USP 70% 4OZ HDX-20 I0020

## (undated) DEVICE — ESU NDL COLORADO MICRO 3CM STR N103A

## (undated) DEVICE — Device

## (undated) DEVICE — SPONGE COTTONOID 1X1" 80-1403

## (undated) DEVICE — ESU PENCIL SMOKE EVAC W/ROCKER SWITCH 0703-047-000

## (undated) DEVICE — BLADE KNIFE SURG 15 371115

## (undated) DEVICE — ANTIFOG SOLUTION W/FOAM PAD 31142527

## (undated) DEVICE — NDL SPINAL 22GA 3.5" QUINCKE 405181

## (undated) DEVICE — ESU ELEC BLADE 6" COATED/INSULATED E1455-6

## (undated) DEVICE — PREP CHLORAPREP W/ORANGE TINT 10.5ML 260715

## (undated) DEVICE — SYR 50ML LL W/O NDL 309653

## (undated) DEVICE — PACK SPINE INSTRUMENT DRAPE 76091-ACM7820

## (undated) DEVICE — SU SILK 3-0 SH CR 8X18" C013D

## (undated) DEVICE — SOL WATER IRRIG 500ML BOTTLE 2F7113

## (undated) DEVICE — DRAPE C-ARM W/STRAPS 42X72" 07-CA104

## (undated) DEVICE — SOL NACL 0.9% IRRIG 500ML BOTTLE 2F7123

## (undated) DEVICE — PREP SKIN SCRUB TRAY 4461A

## (undated) DEVICE — EYE PACK 25GA CONSTELLATION 10,000 CPM PPK9380-02

## (undated) DEVICE — SU DERMABOND ADVANCED .7ML DNX12

## (undated) DEVICE — ENDO DISSECTOR BLUNT 05MM  BTD05

## (undated) DEVICE — DRAIN JACKSON PRATT RESERVOIR 100ML SU130-1305

## (undated) DEVICE — PREP POVIDONE-IODINE 7.5% SCRUB 4OZ BOTTLE MDS093945

## (undated) DEVICE — NDL 22GA 1.5"

## (undated) DEVICE — SU PROLENE 5-0 P-3 18" 8698G

## (undated) DEVICE — CAST PLASTER SPLINT 4X15" EXTRA FAST

## (undated) DEVICE — SU PDS II 5-0 P-3 18" Z493G

## (undated) DEVICE — EYE NDL RETROBULBAR ATKINSON 25GA 1.5" 581637

## (undated) DEVICE — TUBING EXTENSION SET MICROBORE 6" LL 2N1194

## (undated) DEVICE — SUTURE VICRYL+ 1 CT-1 CR 8X18" VIO VCP741D

## (undated) DEVICE — LINEN TOWEL PACK X5 5464

## (undated) DEVICE — NDL EPIDURAL TUOHY 22GA 3.5" 4911-22

## (undated) DEVICE — SOLUTION IRRIGATION 0.9% NACL 1000ML BOTTLE R5200-01

## (undated) DEVICE — PACK HAND CUSTOM ASC

## (undated) DEVICE — PACK MINOR EYE CUSTOM ASC

## (undated) DEVICE — GLOVE PROTEXIS W/NEU-THERA 7.0  2D73TE70

## (undated) DEVICE — SU VICRYL 1 CT-1 CR 8X18" J741D

## (undated) DEVICE — BNDG ELASTIC 3"X5YDS UNSTERILE 6611-30

## (undated) DEVICE — ESU PENCIL W/HOLSTER E2350H

## (undated) DEVICE — SU PROLENE 0 CTX 30" 8454H

## (undated) DEVICE — TRAY PAIN INJECTION 97A 640

## (undated) DEVICE — SUCTION MANIFOLD NEPTUNE 2 SYS 4 PORT 0702-020-000

## (undated) DEVICE — GLOVE PROTEXIS MICRO 7.0  2D73PM70

## (undated) DEVICE — SU MONOCRYL 4-0 PS-2 18" UND Y496G

## (undated) DEVICE — ESU GROUND PAD UNIVERSAL W/O CORD

## (undated) DEVICE — SYR 03ML LL W/O NDL 309657

## (undated) DEVICE — SU PROLENE 5-0 PS-3 18" 8681G

## (undated) DEVICE — GLOVE BIOGEL PI ULTRATOUCH G SZ 7.0 42170

## (undated) DEVICE — LINEN TOWEL PACK X30 5481

## (undated) DEVICE — DRAIN ROUND W/RESERV KIT JACKSON PRATT 10FR 400ML SU130-402D

## (undated) DEVICE — LINEN GOWN X4 5410

## (undated) DEVICE — DRAPE IOBAN INCISE 13X13" 6640EZ

## (undated) DEVICE — GLOVE PROTEXIS MICRO 6.5  2D73PM65

## (undated) DEVICE — ENDO BABCOCK 05MM 5BB

## (undated) DEVICE — CATH TRAY FOLEY SURESTEP 16FR W/URNE MTR STLK LATEX A303316A

## (undated) DEVICE — SU MONOCRYL 4-0 PS-2 27" UND Y426H

## (undated) DEVICE — GLOVE PROTEXIS MICRO 7.0 LT BLUE 2D73PM70

## (undated) DEVICE — GLOVE PROTEXIS BLUE W/NEU-THERA 8.0  2D73EB80

## (undated) DEVICE — DRSG STERI STRIP 1X5" R1548

## (undated) DEVICE — DRSG PRIMAPORE 03 1/8X6" 66000318

## (undated) DEVICE — RX SURGIFLO HEMOSTATIC MATRIX W/THROMBIN 8ML 2994

## (undated) DEVICE — GLOVE PROTEXIS BLUE W/NEU-THERA 7.5  2D73EB75

## (undated) DEVICE — SYR 05ML LL W/O NDL

## (undated) DEVICE — DRAPE POUCH IRR 1016

## (undated) DEVICE — ENDO TROCAR SLEEVE KII ADV FIXATION 05X100MM CFS02

## (undated) DEVICE — BLADE KNIFE SURG 10 371110

## (undated) DEVICE — SOL WATER IRRIG 1000ML BOTTLE 2F7114

## (undated) DEVICE — PEN MARKING SKIN TYCO DEVON DUAL TIP 31145868

## (undated) DEVICE — PACK ENT MINOR CUSTOM ASC

## (undated) DEVICE — SET IV EXT 8IN MICRO POWER INJ 7N8300

## (undated) DEVICE — MARKER SPHERES PASSIVE MEDT PACK 5 8801075

## (undated) DEVICE — STPL LINEAR CUT 100MM TLC10

## (undated) DEVICE — DRSG TEGADERM 4X4 3/4" 1626W

## (undated) DEVICE — EYE DRSG PAD OVAL

## (undated) DEVICE — EYE SHIELD PLASTIC

## (undated) DEVICE — SU MONOCRYL 5-0 P-3 18" UND Y493G

## (undated) DEVICE — ESU PENCIL W/HOLSTER

## (undated) DEVICE — TOOL DISSECT MIDAS MR8 14CM MATCH HEAD 3MM MR8-14MH30

## (undated) DEVICE — SU SILK 3-0 TIE 12X30" A304H

## (undated) DEVICE — DRAPE O ARM TUBE 9732722

## (undated) DEVICE — LINEN BACK PACK 5440

## (undated) DEVICE — PREP CHLORHEXIDINE 4% 4OZ (HIBICLENS) 57504

## (undated) DEVICE — BLADE KNIFE BEAVER MINI BEAVER6400

## (undated) DEVICE — GLOVE BIOGEL PI MICRO SZ 7.0 48570

## (undated) DEVICE — SU PLAIN 6-0 TG140-8 18" 1735G

## (undated) DEVICE — SLING ARM LG 79-99157

## (undated) DEVICE — SU VICRYL 1 CT-1 36" J347H

## (undated) DEVICE — ESU GROUND PAD ADULT W/CORD E7507

## (undated) DEVICE — SU PDS II 0 CTX 60" Z990G

## (undated) DEVICE — CAST PADDING 3" UNSTERILE 9043

## (undated) DEVICE — SU VICRYL 2-0 SH 27" UND J417H

## (undated) DEVICE — SUTURE MONOCRYL 3-0 18 PS2 UND MCP497G

## (undated) DEVICE — DRAPE IOBAN INCISE 23X17" 6650EZ

## (undated) DEVICE — SU MONOCRYL 3-0 PS-2 18" UND Y497G

## (undated) DEVICE — WIPES FOLEY CARE SURESTEP PROVON DFC100

## (undated) DEVICE — SU PROLENE 5-0 C-1DA 36" 8720H

## (undated) DEVICE — ENDO TROCAR FIRST ENTRY KII FIOS ADV FIX 05X100MM CFF03

## (undated) DEVICE — SUTURE VICRYL+ 2-0 CT-2 CR 8X18" VCP726D

## (undated) DEVICE — SU PDS II 4-0 P-3 18" Z494G

## (undated) DEVICE — GLOVE BIOGEL PI MICRO SZ 7.5 48575

## (undated) DEVICE — DRSG PRIMAPORE 02X3" 7133

## (undated) DEVICE — DECANTER FLUID L3 IN TRANSFER STRL LF DYNJDEC03

## (undated) DEVICE — GLOVE PROTEXIS W/NEU-THERA 7.5  2D73TE75

## (undated) DEVICE — SU PDS II 0 CT-1 27" Z340H

## (undated) DEVICE — CLIP APPLIER ENDO 5MM M/L LIGAMAX EL5ML

## (undated) DEVICE — SUCTION TIP YANKAUER STR K87

## (undated) DEVICE — DRSG AQUACEL AG HYDROFIBER 3.5X6" 422604

## (undated) DEVICE — ADH SKIN CLOSURE PREMIERPRO EXOFIN 1.0ML 3470

## (undated) DEVICE — DRSG GAUZE 4X8" NON21842

## (undated) DEVICE — GLOVE BIOGEL PI MICRO INDICATOR UNDERGLOVE SZ 7.5 48975

## (undated) DEVICE — GOWN IMPERVIOUS SPECIALTY XLG/XLONG 32474

## (undated) DEVICE — PACK VITRECTOMY/RET CUSTOM ASC PQ15VRU12

## (undated) DEVICE — DRSG GAUZE 4X4" TRAY 6939

## (undated) DEVICE — DRSG GAUZE 2X2" 8042

## (undated) DEVICE — LINEN GOWN XLG 5407

## (undated) DEVICE — PACK UNIVERSAL SPLIT 29131

## (undated) DEVICE — ESU ELEC BLADE 2.75" COATED/INSULATED E1455

## (undated) DEVICE — SYR 07ML EPIDURAL LOSS OF RESISTANCE PULSATOR 4905

## (undated) DEVICE — POSITIONER ARMBOARD FOAM CONVOLUTE CP-501

## (undated) DEVICE — SU SILK 3-0 SH 30" K832H

## (undated) DEVICE — SU VICRYL 0 UR-6 27" J603H

## (undated) DEVICE — GLOVE PROTEXIS MICRO 7.5  2D73PM75

## (undated) DEVICE — SU VICRYL 2-0 CT-2 8X18" UND D8144

## (undated) DEVICE — SU PDS II 5-0 RB-1 27" Z303H

## (undated) DEVICE — POSITIONER ARMBOARD FOAM 1PAIR LF FP-ARMB1

## (undated) DEVICE — DRAPE C-ARMOR 5 SIDED 5523

## (undated) DEVICE — EYE DRAPE MICROSCOPE UNIVERSAL (BIOM FULL) 08-MK55140

## (undated) DEVICE — SOLUTION WATER 1000ML BOTTLE R5000-01

## (undated) DEVICE — DRAIN JACKSON PRATT ROUND W/TROCAR 15FR LF JP-HUR151

## (undated) DEVICE — SOLUTION IV 0.9% NACL 1000ML E8000

## (undated) DEVICE — ESU LIGASURE MARYLAND LAPAROSCOPIC SLR/DVDR 5MMX37CM LF1937

## (undated) DEVICE — ESU CORD BIPOLAR GREEN 10-4000

## (undated) DEVICE — GLOVE PROTEXIS BLUE W/NEU-THERA 6.5  2D73EB65

## (undated) DEVICE — CATH TRAY FOLEY SURESTEP 16FR WDRAIN BAG STLK LATEX A300316A

## (undated) DEVICE — SU VICRYL 3-0 SH 27" UND J416H

## (undated) DEVICE — TOURNIQUET SGL BLADDER 12"X3.5" GREEN 5921-212-135

## (undated) DEVICE — PIN PERCUTANEOUS NAVIGATION FOR SPINE O-ARM150MM 9733236

## (undated) DEVICE — SOL NACL 0.9% IRRIG 1000ML BOTTLE 2F7124

## (undated) DEVICE — ESU PENCIL W/COATED BLADE E2450H

## (undated) DEVICE — LINEN TOWEL PACK X6 WHITE 5487

## (undated) DEVICE — NDL INSUFFLATION 13GA 120MM C2201

## (undated) DEVICE — LINEN ORTHO PACK 5446

## (undated) DEVICE — ESU EYE HIGH TEMP 65410-183

## (undated) DEVICE — DRSG STERI STRIP 1/2X4" R1547

## (undated) RX ORDER — TRIAMCINOLONE ACETONIDE 40 MG/ML
INJECTION, SUSPENSION INTRA-ARTICULAR; INTRAMUSCULAR
Status: DISPENSED
Start: 2020-09-11

## (undated) RX ORDER — FENTANYL CITRATE 50 UG/ML
INJECTION, SOLUTION INTRAMUSCULAR; INTRAVENOUS
Status: DISPENSED
Start: 2021-11-18

## (undated) RX ORDER — DEXMEDETOMIDINE HYDROCHLORIDE 4 UG/ML
INJECTION, SOLUTION INTRAVENOUS
Status: DISPENSED
Start: 2025-06-04

## (undated) RX ORDER — METOPROLOL TARTRATE 1 MG/ML
INJECTION, SOLUTION INTRAVENOUS
Status: DISPENSED
Start: 2021-12-02

## (undated) RX ORDER — ACETAMINOPHEN 325 MG/1
TABLET ORAL
Status: DISPENSED
Start: 2025-07-23

## (undated) RX ORDER — GABAPENTIN 300 MG/1
CAPSULE ORAL
Status: DISPENSED
Start: 2021-11-18

## (undated) RX ORDER — GABAPENTIN 100 MG/1
CAPSULE ORAL
Status: DISPENSED
Start: 2025-07-23

## (undated) RX ORDER — METHOCARBAMOL 750 MG/1
TABLET, FILM COATED ORAL
Status: DISPENSED
Start: 2021-11-18

## (undated) RX ORDER — FENTANYL CITRATE 50 UG/ML
INJECTION, SOLUTION INTRAMUSCULAR; INTRAVENOUS
Status: DISPENSED
Start: 2024-07-25

## (undated) RX ORDER — GLYCOPYRROLATE 0.2 MG/ML
INJECTION INTRAMUSCULAR; INTRAVENOUS
Status: DISPENSED
Start: 2021-12-02

## (undated) RX ORDER — FENTANYL CITRATE 50 UG/ML
INJECTION, SOLUTION INTRAMUSCULAR; INTRAVENOUS
Status: DISPENSED
Start: 2022-05-06

## (undated) RX ORDER — FENTANYL CITRATE 50 UG/ML
INJECTION, SOLUTION INTRAMUSCULAR; INTRAVENOUS
Status: DISPENSED
Start: 2021-12-02

## (undated) RX ORDER — LIDOCAINE HYDROCHLORIDE 10 MG/ML
INJECTION, SOLUTION EPIDURAL; INFILTRATION; INTRACAUDAL; PERINEURAL
Status: DISPENSED
Start: 2021-09-13

## (undated) RX ORDER — HYDROMORPHONE HYDROCHLORIDE 1 MG/ML
INJECTION, SOLUTION INTRAMUSCULAR; INTRAVENOUS; SUBCUTANEOUS
Status: DISPENSED
Start: 2025-07-23

## (undated) RX ORDER — ROCURONIUM BROMIDE 50 MG/5 ML
SYRINGE (ML) INTRAVENOUS
Status: DISPENSED
Start: 2021-11-18

## (undated) RX ORDER — PROPOFOL 10 MG/ML
INJECTION, EMULSION INTRAVENOUS
Status: DISPENSED
Start: 2021-12-02

## (undated) RX ORDER — FENTANYL CITRATE 50 UG/ML
INJECTION, SOLUTION INTRAMUSCULAR; INTRAVENOUS
Status: DISPENSED
Start: 2022-06-20

## (undated) RX ORDER — LIDOCAINE HYDROCHLORIDE 20 MG/ML
INJECTION, SOLUTION EPIDURAL; INFILTRATION; INTRACAUDAL; PERINEURAL
Status: DISPENSED
Start: 2022-06-20

## (undated) RX ORDER — PROPOFOL 10 MG/ML
INJECTION, EMULSION INTRAVENOUS
Status: DISPENSED
Start: 2022-06-20

## (undated) RX ORDER — CEFAZOLIN SODIUM 2 G/50ML
SOLUTION INTRAVENOUS
Status: DISPENSED
Start: 2024-07-25

## (undated) RX ORDER — CITRIC ACID/SODIUM CITRATE 334-500MG
SOLUTION, ORAL ORAL
Status: DISPENSED
Start: 2025-06-04

## (undated) RX ORDER — EPHEDRINE SULFATE 50 MG/ML
INJECTION, SOLUTION INTRAMUSCULAR; INTRAVENOUS; SUBCUTANEOUS
Status: DISPENSED
Start: 2025-06-04

## (undated) RX ORDER — ONDANSETRON 2 MG/ML
INJECTION INTRAMUSCULAR; INTRAVENOUS
Status: DISPENSED
Start: 2025-05-04

## (undated) RX ORDER — DEXAMETHASONE SODIUM PHOSPHATE 4 MG/ML
INJECTION, SOLUTION INTRA-ARTICULAR; INTRALESIONAL; INTRAMUSCULAR; INTRAVENOUS; SOFT TISSUE
Status: DISPENSED
Start: 2025-05-04

## (undated) RX ORDER — FENTANYL CITRATE 50 UG/ML
INJECTION, SOLUTION INTRAMUSCULAR; INTRAVENOUS
Status: DISPENSED
Start: 2025-07-23

## (undated) RX ORDER — ONDANSETRON 2 MG/ML
INJECTION INTRAMUSCULAR; INTRAVENOUS
Status: DISPENSED
Start: 2025-06-04

## (undated) RX ORDER — GABAPENTIN 100 MG/1
CAPSULE ORAL
Status: DISPENSED
Start: 2021-11-18

## (undated) RX ORDER — ONDANSETRON 2 MG/ML
INJECTION INTRAMUSCULAR; INTRAVENOUS
Status: DISPENSED
Start: 2021-11-18

## (undated) RX ORDER — ONDANSETRON 2 MG/ML
INJECTION INTRAMUSCULAR; INTRAVENOUS
Status: DISPENSED
Start: 2022-06-20

## (undated) RX ORDER — HYDROMORPHONE HYDROCHLORIDE 1 MG/ML
INJECTION, SOLUTION INTRAMUSCULAR; INTRAVENOUS; SUBCUTANEOUS
Status: DISPENSED
Start: 2021-11-18

## (undated) RX ORDER — LIDOCAINE HYDROCHLORIDE 10 MG/ML
INJECTION, SOLUTION EPIDURAL; INFILTRATION; INTRACAUDAL; PERINEURAL
Status: DISPENSED
Start: 2020-08-10

## (undated) RX ORDER — HYDROMORPHONE HYDROCHLORIDE 1 MG/ML
INJECTION, SOLUTION INTRAMUSCULAR; INTRAVENOUS; SUBCUTANEOUS
Status: DISPENSED
Start: 2025-05-04

## (undated) RX ORDER — CEFAZOLIN SODIUM 2 G/50ML
SOLUTION INTRAVENOUS
Status: DISPENSED
Start: 2021-12-02

## (undated) RX ORDER — BETAMETHASONE SODIUM PHOSPHATE AND BETAMETHASONE ACETATE 3; 3 MG/ML; MG/ML
INJECTION, SUSPENSION INTRA-ARTICULAR; INTRALESIONAL; INTRAMUSCULAR; SOFT TISSUE
Status: DISPENSED
Start: 2022-06-10

## (undated) RX ORDER — ONDANSETRON 2 MG/ML
INJECTION INTRAMUSCULAR; INTRAVENOUS
Status: DISPENSED
Start: 2022-05-06

## (undated) RX ORDER — CEFAZOLIN SODIUM 2 G/100ML
INJECTION, SOLUTION INTRAVENOUS
Status: DISPENSED
Start: 2021-11-18

## (undated) RX ORDER — TRIAMCINOLONE ACETONIDE 40 MG/ML
INJECTION, SUSPENSION INTRA-ARTICULAR; INTRAMUSCULAR
Status: DISPENSED
Start: 2018-11-30

## (undated) RX ORDER — ONDANSETRON 2 MG/ML
INJECTION INTRAMUSCULAR; INTRAVENOUS
Status: DISPENSED
Start: 2021-12-02

## (undated) RX ORDER — TRIAMCINOLONE ACETONIDE 40 MG/ML
INJECTION, SUSPENSION INTRA-ARTICULAR; INTRAMUSCULAR
Status: DISPENSED
Start: 2020-01-02

## (undated) RX ORDER — ACETAMINOPHEN 325 MG/1
TABLET ORAL
Status: DISPENSED
Start: 2021-12-02

## (undated) RX ORDER — HYDROMORPHONE HYDROCHLORIDE 1 MG/ML
INJECTION, SOLUTION INTRAMUSCULAR; INTRAVENOUS; SUBCUTANEOUS
Status: DISPENSED
Start: 2021-12-02

## (undated) RX ORDER — DEXAMETHASONE SODIUM PHOSPHATE 10 MG/ML
INJECTION, SOLUTION INTRAMUSCULAR; INTRAVENOUS
Status: DISPENSED
Start: 2020-08-10

## (undated) RX ORDER — PROPOFOL 10 MG/ML
INJECTION, EMULSION INTRAVENOUS
Status: DISPENSED
Start: 2022-05-06

## (undated) RX ORDER — TRIAMCINOLONE ACETONIDE 40 MG/ML
INJECTION, SUSPENSION INTRA-ARTICULAR; INTRAMUSCULAR
Status: DISPENSED
Start: 2021-05-13

## (undated) RX ORDER — SODIUM CHLORIDE, SODIUM LACTATE, POTASSIUM CHLORIDE, CALCIUM CHLORIDE 600; 310; 30; 20 MG/100ML; MG/100ML; MG/100ML; MG/100ML
INJECTION, SOLUTION INTRAVENOUS
Status: DISPENSED
Start: 2021-11-18

## (undated) RX ORDER — HYDROMORPHONE HYDROCHLORIDE 1 MG/ML
INJECTION, SOLUTION INTRAMUSCULAR; INTRAVENOUS; SUBCUTANEOUS
Status: DISPENSED
Start: 2025-06-04

## (undated) RX ORDER — GLYCOPYRROLATE 0.2 MG/ML
INJECTION, SOLUTION INTRAMUSCULAR; INTRAVENOUS
Status: DISPENSED
Start: 2025-06-04

## (undated) RX ORDER — CEFAZOLIN SODIUM 1 G/3ML
INJECTION, POWDER, FOR SOLUTION INTRAMUSCULAR; INTRAVENOUS
Status: DISPENSED
Start: 2021-11-18

## (undated) RX ORDER — OXYCODONE HYDROCHLORIDE 5 MG/1
TABLET ORAL
Status: DISPENSED
Start: 2021-12-02

## (undated) RX ORDER — LABETALOL HYDROCHLORIDE 5 MG/ML
INJECTION, SOLUTION INTRAVENOUS
Status: DISPENSED
Start: 2021-12-02

## (undated) RX ORDER — ESMOLOL HYDROCHLORIDE 10 MG/ML
INJECTION INTRAVENOUS
Status: DISPENSED
Start: 2025-05-04

## (undated) RX ORDER — FENTANYL CITRATE 50 UG/ML
INJECTION, SOLUTION INTRAMUSCULAR; INTRAVENOUS
Status: DISPENSED
Start: 2025-05-04

## (undated) RX ORDER — OXYCODONE HYDROCHLORIDE 5 MG/1
TABLET ORAL
Status: DISPENSED
Start: 2025-05-04

## (undated) RX ORDER — PROPOFOL 10 MG/ML
INJECTION, EMULSION INTRAVENOUS
Status: DISPENSED
Start: 2025-06-04

## (undated) RX ORDER — LIDOCAINE HYDROCHLORIDE 10 MG/ML
INJECTION, SOLUTION EPIDURAL; INFILTRATION; INTRACAUDAL; PERINEURAL
Status: DISPENSED
Start: 2022-06-10

## (undated) RX ORDER — TRIAMCINOLONE ACETONIDE 40 MG/ML
INJECTION, SUSPENSION INTRA-ARTICULAR; INTRAMUSCULAR
Status: DISPENSED
Start: 2021-09-13

## (undated) RX ORDER — FENTANYL CITRATE 50 UG/ML
INJECTION, SOLUTION INTRAMUSCULAR; INTRAVENOUS
Status: DISPENSED
Start: 2025-06-04

## (undated) RX ORDER — WATER 10 ML/10ML
INJECTION INTRAMUSCULAR; INTRAVENOUS; SUBCUTANEOUS
Status: DISPENSED
Start: 2022-06-20

## (undated) RX ORDER — ACETAMINOPHEN 325 MG/1
TABLET ORAL
Status: DISPENSED
Start: 2022-06-20

## (undated) RX ORDER — HYDRALAZINE HYDROCHLORIDE 20 MG/ML
INJECTION INTRAMUSCULAR; INTRAVENOUS
Status: DISPENSED
Start: 2021-12-02

## (undated) RX ORDER — OXYCODONE HYDROCHLORIDE 5 MG/1
TABLET ORAL
Status: DISPENSED
Start: 2025-06-04

## (undated) RX ORDER — ACETAMINOPHEN 325 MG/1
TABLET ORAL
Status: DISPENSED
Start: 2025-05-04

## (undated) RX ORDER — BUPIVACAINE HYDROCHLORIDE AND EPINEPHRINE 2.5; 5 MG/ML; UG/ML
INJECTION, SOLUTION EPIDURAL; INFILTRATION; INTRACAUDAL; PERINEURAL
Status: DISPENSED
Start: 2025-05-04

## (undated) RX ORDER — LIDOCAINE HYDROCHLORIDE 10 MG/ML
INJECTION, SOLUTION EPIDURAL; INFILTRATION; INTRACAUDAL; PERINEURAL
Status: DISPENSED
Start: 2020-09-11

## (undated) RX ORDER — LIDOCAINE HCL/PF 100 MG/5ML
SYRINGE (ML) INJECTION
Status: DISPENSED
Start: 2022-06-20

## (undated) RX ORDER — ONDANSETRON 2 MG/ML
INJECTION INTRAMUSCULAR; INTRAVENOUS
Status: DISPENSED
Start: 2024-07-25

## (undated) RX ORDER — FENTANYL CITRATE-0.9 % NACL/PF 10 MCG/ML
PLASTIC BAG, INJECTION (ML) INTRAVENOUS
Status: DISPENSED
Start: 2025-05-04

## (undated) RX ORDER — ACETAMINOPHEN 325 MG/1
TABLET ORAL
Status: DISPENSED
Start: 2022-05-06

## (undated) RX ORDER — OXYCODONE HYDROCHLORIDE 5 MG/1
TABLET ORAL
Status: DISPENSED
Start: 2021-11-18

## (undated) RX ORDER — DEXAMETHASONE SODIUM PHOSPHATE 4 MG/ML
INJECTION, SOLUTION INTRA-ARTICULAR; INTRALESIONAL; INTRAMUSCULAR; INTRAVENOUS; SOFT TISSUE
Status: DISPENSED
Start: 2025-06-04

## (undated) RX ORDER — TRIAMCINOLONE ACETONIDE 40 MG/ML
INJECTION, SUSPENSION INTRA-ARTICULAR; INTRAMUSCULAR
Status: DISPENSED
Start: 2020-10-29

## (undated) RX ORDER — VANCOMYCIN HYDROCHLORIDE 1 G/20ML
INJECTION, POWDER, LYOPHILIZED, FOR SOLUTION INTRAVENOUS
Status: DISPENSED
Start: 2021-11-18

## (undated) RX ORDER — METOCLOPRAMIDE HYDROCHLORIDE 5 MG/ML
INJECTION INTRAMUSCULAR; INTRAVENOUS
Status: DISPENSED
Start: 2025-06-04

## (undated) RX ORDER — PROPOFOL 10 MG/ML
INJECTION, EMULSION INTRAVENOUS
Status: DISPENSED
Start: 2024-07-25

## (undated) RX ORDER — OXYCODONE HYDROCHLORIDE 5 MG/1
TABLET ORAL
Status: DISPENSED
Start: 2022-05-06

## (undated) RX ORDER — OXYCODONE HYDROCHLORIDE 5 MG/1
TABLET ORAL
Status: DISPENSED
Start: 2025-07-23

## (undated) RX ORDER — CEFAZOLIN SODIUM 2 G/50ML
SOLUTION INTRAVENOUS
Status: DISPENSED
Start: 2025-06-04

## (undated) RX ORDER — ACETAMINOPHEN 325 MG/1
TABLET ORAL
Status: DISPENSED
Start: 2025-06-04

## (undated) RX ORDER — TRIAMCINOLONE ACETONIDE 40 MG/ML
INJECTION, SUSPENSION INTRA-ARTICULAR; INTRAMUSCULAR
Status: DISPENSED
Start: 2025-06-10

## (undated) RX ORDER — LIDOCAINE HYDROCHLORIDE 10 MG/ML
INJECTION, SOLUTION EPIDURAL; INFILTRATION; INTRACAUDAL; PERINEURAL
Status: DISPENSED
Start: 2020-01-02

## (undated) RX ORDER — DEXAMETHASONE SODIUM PHOSPHATE 4 MG/ML
INJECTION, SOLUTION INTRA-ARTICULAR; INTRALESIONAL; INTRAMUSCULAR; INTRAVENOUS; SOFT TISSUE
Status: DISPENSED
Start: 2022-05-06

## (undated) RX ORDER — ACETAMINOPHEN 325 MG/1
TABLET ORAL
Status: DISPENSED
Start: 2024-07-25

## (undated) RX ORDER — LIDOCAINE HYDROCHLORIDE AND EPINEPHRINE 10; 10 MG/ML; UG/ML
INJECTION, SOLUTION INFILTRATION; PERINEURAL
Status: DISPENSED
Start: 2024-07-25

## (undated) RX ORDER — LIDOCAINE HYDROCHLORIDE 10 MG/ML
INJECTION, SOLUTION EPIDURAL; INFILTRATION; INTRACAUDAL; PERINEURAL
Status: DISPENSED
Start: 2021-05-13

## (undated) RX ORDER — ACETAMINOPHEN 325 MG/1
TABLET ORAL
Status: DISPENSED
Start: 2021-11-18

## (undated) RX ORDER — LIDOCAINE HYDROCHLORIDE 20 MG/ML
INJECTION, SOLUTION EPIDURAL; INFILTRATION; INTRACAUDAL; PERINEURAL
Status: DISPENSED
Start: 2022-05-06

## (undated) RX ORDER — SCOLOPAMINE TRANSDERMAL SYSTEM 1 MG/1
PATCH, EXTENDED RELEASE TRANSDERMAL
Status: DISPENSED
Start: 2021-11-18

## (undated) RX ORDER — BUPIVACAINE HYDROCHLORIDE 2.5 MG/ML
INJECTION, SOLUTION EPIDURAL; INFILTRATION; INTRACAUDAL; PERINEURAL
Status: DISPENSED
Start: 2025-05-04

## (undated) RX ORDER — EPHEDRINE SULFATE 50 MG/ML
INJECTION, SOLUTION INTRAMUSCULAR; INTRAVENOUS; SUBCUTANEOUS
Status: DISPENSED
Start: 2021-11-18

## (undated) RX ORDER — LIDOCAINE HYDROCHLORIDE 10 MG/ML
INJECTION, SOLUTION EPIDURAL; INFILTRATION; INTRACAUDAL; PERINEURAL
Status: DISPENSED
Start: 2020-10-29

## (undated) RX ORDER — LIDOCAINE HYDROCHLORIDE AND EPINEPHRINE 10; 10 MG/ML; UG/ML
INJECTION, SOLUTION INFILTRATION; PERINEURAL
Status: DISPENSED
Start: 2025-06-04

## (undated) RX ORDER — LIDOCAINE HYDROCHLORIDE 10 MG/ML
INJECTION, SOLUTION INFILTRATION; PERINEURAL
Status: DISPENSED
Start: 2025-06-10

## (undated) RX ORDER — LIDOCAINE HYDROCHLORIDE 10 MG/ML
INJECTION, SOLUTION EPIDURAL; INFILTRATION; INTRACAUDAL; PERINEURAL
Status: DISPENSED
Start: 2018-11-30